# Patient Record
Sex: FEMALE | Race: WHITE | NOT HISPANIC OR LATINO | URBAN - METROPOLITAN AREA
[De-identification: names, ages, dates, MRNs, and addresses within clinical notes are randomized per-mention and may not be internally consistent; named-entity substitution may affect disease eponyms.]

---

## 2017-01-24 ENCOUNTER — INPATIENT (INPATIENT)
Facility: HOSPITAL | Age: 47
LOS: 3 days | Discharge: HOME | End: 2017-01-28
Attending: INTERNAL MEDICINE | Admitting: INTERNAL MEDICINE

## 2017-05-26 ENCOUNTER — APPOINTMENT (OUTPATIENT)
Dept: HEMATOLOGY ONCOLOGY | Facility: CLINIC | Age: 47
End: 2017-05-26

## 2017-06-26 ENCOUNTER — APPOINTMENT (OUTPATIENT)
Dept: HEMATOLOGY ONCOLOGY | Facility: CLINIC | Age: 47
End: 2017-06-26

## 2017-06-26 ENCOUNTER — OUTPATIENT (OUTPATIENT)
Dept: OUTPATIENT SERVICES | Facility: HOSPITAL | Age: 47
LOS: 1 days | Discharge: HOME | End: 2017-06-26

## 2017-06-26 VITALS
HEART RATE: 73 BPM | WEIGHT: 152 LBS | TEMPERATURE: 96.8 F | RESPIRATION RATE: 12 BRPM | DIASTOLIC BLOOD PRESSURE: 79 MMHG | SYSTOLIC BLOOD PRESSURE: 165 MMHG | HEIGHT: 63 IN | BODY MASS INDEX: 26.93 KG/M2

## 2017-06-26 DIAGNOSIS — Z87.891 PERSONAL HISTORY OF NICOTINE DEPENDENCE: ICD-10-CM

## 2017-06-26 DIAGNOSIS — J15.9 UNSPECIFIED BACTERIAL PNEUMONIA: ICD-10-CM

## 2017-06-26 DIAGNOSIS — Z80.3 FAMILY HISTORY OF MALIGNANT NEOPLASM OF BREAST: ICD-10-CM

## 2017-06-26 DIAGNOSIS — K12.1 OTHER FORMS OF STOMATITIS: ICD-10-CM

## 2017-06-26 DIAGNOSIS — R07.9 CHEST PAIN, UNSPECIFIED: ICD-10-CM

## 2017-06-26 DIAGNOSIS — M06.9 RHEUMATOID ARTHRITIS, UNSPECIFIED: ICD-10-CM

## 2017-06-26 DIAGNOSIS — Z80.8 FAMILY HISTORY OF MALIGNANT NEOPLASM OF OTHER ORGANS OR SYSTEMS: ICD-10-CM

## 2017-06-26 DIAGNOSIS — T82.9XXA UNSPECIFIED COMPLICATION OF CARDIAC AND VASCULAR PROSTHETIC DEVICE, IMPLANT AND GRAFT, INITIAL ENCOUNTER: ICD-10-CM

## 2017-06-26 DIAGNOSIS — K52.9 NONINFECTIVE GASTROENTERITIS AND COLITIS, UNSPECIFIED: ICD-10-CM

## 2017-06-26 DIAGNOSIS — R55 SYNCOPE AND COLLAPSE: ICD-10-CM

## 2017-06-26 DIAGNOSIS — I10 ESSENTIAL (PRIMARY) HYPERTENSION: ICD-10-CM

## 2017-06-26 DIAGNOSIS — L93.0 DISCOID LUPUS ERYTHEMATOSUS: ICD-10-CM

## 2017-06-26 DIAGNOSIS — I82.409 ACUTE EMBOLISM AND THROMBOSIS OF UNSPECIFIED DEEP VEINS OF UNSPECIFIED LOWER EXTREMITY: ICD-10-CM

## 2017-06-26 DIAGNOSIS — J20.9 ACUTE BRONCHITIS, UNSPECIFIED: ICD-10-CM

## 2017-06-26 DIAGNOSIS — Z80.1 FAMILY HISTORY OF MALIGNANT NEOPLASM OF TRACHEA, BRONCHUS AND LUNG: ICD-10-CM

## 2017-06-26 DIAGNOSIS — Z80.41 FAMILY HISTORY OF MALIGNANT NEOPLASM OF OVARY: ICD-10-CM

## 2017-06-26 DIAGNOSIS — J44.9 CHRONIC OBSTRUCTIVE PULMONARY DISEASE, UNSPECIFIED: ICD-10-CM

## 2017-06-27 DIAGNOSIS — Z90.710 ACQUIRED ABSENCE OF BOTH CERVIX AND UTERUS: ICD-10-CM

## 2017-06-27 DIAGNOSIS — J44.9 CHRONIC OBSTRUCTIVE PULMONARY DISEASE, UNSPECIFIED: ICD-10-CM

## 2017-06-27 DIAGNOSIS — Z95.810 PRESENCE OF AUTOMATIC (IMPLANTABLE) CARDIAC DEFIBRILLATOR: ICD-10-CM

## 2017-06-27 DIAGNOSIS — R55 SYNCOPE AND COLLAPSE: ICD-10-CM

## 2017-06-27 DIAGNOSIS — R53.1 WEAKNESS: ICD-10-CM

## 2017-06-27 DIAGNOSIS — M32.9 SYSTEMIC LUPUS ERYTHEMATOSUS, UNSPECIFIED: ICD-10-CM

## 2017-06-27 DIAGNOSIS — R04.0 EPISTAXIS: ICD-10-CM

## 2017-06-27 DIAGNOSIS — Z86.74 PERSONAL HISTORY OF SUDDEN CARDIAC ARREST: ICD-10-CM

## 2017-06-27 DIAGNOSIS — J02.9 ACUTE PHARYNGITIS, UNSPECIFIED: ICD-10-CM

## 2017-06-27 DIAGNOSIS — E87.6 HYPOKALEMIA: ICD-10-CM

## 2017-06-27 DIAGNOSIS — D69.6 THROMBOCYTOPENIA, UNSPECIFIED: ICD-10-CM

## 2017-06-27 DIAGNOSIS — K52.9 NONINFECTIVE GASTROENTERITIS AND COLITIS, UNSPECIFIED: ICD-10-CM

## 2017-06-27 DIAGNOSIS — Z90.02 ACQUIRED ABSENCE OF LARYNX: ICD-10-CM

## 2017-06-27 DIAGNOSIS — Z87.891 PERSONAL HISTORY OF NICOTINE DEPENDENCE: ICD-10-CM

## 2017-06-27 DIAGNOSIS — M06.9 RHEUMATOID ARTHRITIS, UNSPECIFIED: ICD-10-CM

## 2017-06-27 DIAGNOSIS — Z85.41 PERSONAL HISTORY OF MALIGNANT NEOPLASM OF CERVIX UTERI: ICD-10-CM

## 2017-06-27 DIAGNOSIS — E86.1 HYPOVOLEMIA: ICD-10-CM

## 2017-06-27 DIAGNOSIS — D72.819 DECREASED WHITE BLOOD CELL COUNT, UNSPECIFIED: ICD-10-CM

## 2017-06-27 DIAGNOSIS — N18.9 CHRONIC KIDNEY DISEASE, UNSPECIFIED: ICD-10-CM

## 2017-06-27 DIAGNOSIS — Z87.442 PERSONAL HISTORY OF URINARY CALCULI: ICD-10-CM

## 2017-06-27 DIAGNOSIS — M79.1 MYALGIA: ICD-10-CM

## 2017-06-27 DIAGNOSIS — N17.9 ACUTE KIDNEY FAILURE, UNSPECIFIED: ICD-10-CM

## 2017-06-28 DIAGNOSIS — D68.59 OTHER PRIMARY THROMBOPHILIA: ICD-10-CM

## 2017-09-08 ENCOUNTER — APPOINTMENT (OUTPATIENT)
Dept: OTOLARYNGOLOGY | Facility: CLINIC | Age: 47
End: 2017-09-08

## 2017-10-02 ENCOUNTER — APPOINTMENT (OUTPATIENT)
Dept: OTOLARYNGOLOGY | Facility: CLINIC | Age: 47
End: 2017-10-02
Payer: MEDICARE

## 2017-10-02 ENCOUNTER — OUTPATIENT (OUTPATIENT)
Dept: OUTPATIENT SERVICES | Facility: HOSPITAL | Age: 47
LOS: 1 days | Discharge: HOME | End: 2017-10-02

## 2017-10-02 VITALS — WEIGHT: 153 LBS | HEIGHT: 63 IN | BODY MASS INDEX: 27.11 KG/M2

## 2017-10-02 VITALS — HEIGHT: 63 IN | BODY MASS INDEX: 27.11 KG/M2 | WEIGHT: 153 LBS

## 2017-10-02 DIAGNOSIS — M06.9 RHEUMATOID ARTHRITIS, UNSPECIFIED: ICD-10-CM

## 2017-10-02 DIAGNOSIS — J20.9 ACUTE BRONCHITIS, UNSPECIFIED: ICD-10-CM

## 2017-10-02 DIAGNOSIS — R19.8 OTHER SPECIFIED SYMPTOMS AND SIGNS INVOLVING THE DIGESTIVE SYSTEM AND ABDOMEN: ICD-10-CM

## 2017-10-02 DIAGNOSIS — R07.9 CHEST PAIN, UNSPECIFIED: ICD-10-CM

## 2017-10-02 DIAGNOSIS — R31.9 HEMATURIA, UNSPECIFIED: ICD-10-CM

## 2017-10-02 DIAGNOSIS — D69.9 HEMORRHAGIC CONDITION, UNSPECIFIED: ICD-10-CM

## 2017-10-02 DIAGNOSIS — J15.9 UNSPECIFIED BACTERIAL PNEUMONIA: ICD-10-CM

## 2017-10-02 DIAGNOSIS — E07.9 DISORDER OF THYROID, UNSPECIFIED: ICD-10-CM

## 2017-10-02 DIAGNOSIS — E55.9 VITAMIN D DEFICIENCY, UNSPECIFIED: ICD-10-CM

## 2017-10-02 DIAGNOSIS — E78.9 DISORDER OF LIPOPROTEIN METABOLISM, UNSPECIFIED: ICD-10-CM

## 2017-10-02 DIAGNOSIS — R55 SYNCOPE AND COLLAPSE: ICD-10-CM

## 2017-10-02 DIAGNOSIS — K52.9 NONINFECTIVE GASTROENTERITIS AND COLITIS, UNSPECIFIED: ICD-10-CM

## 2017-10-02 DIAGNOSIS — D64.3 OTHER SIDEROBLASTIC ANEMIAS: ICD-10-CM

## 2017-10-02 DIAGNOSIS — I82.409 ACUTE EMBOLISM AND THROMBOSIS OF UNSPECIFIED DEEP VEINS OF UNSPECIFIED LOWER EXTREMITY: ICD-10-CM

## 2017-10-02 DIAGNOSIS — R07.81 PLEURODYNIA: ICD-10-CM

## 2017-10-02 DIAGNOSIS — I10 ESSENTIAL (PRIMARY) HYPERTENSION: ICD-10-CM

## 2017-10-02 DIAGNOSIS — D50.9 IRON DEFICIENCY ANEMIA, UNSPECIFIED: ICD-10-CM

## 2017-10-02 DIAGNOSIS — J44.9 CHRONIC OBSTRUCTIVE PULMONARY DISEASE, UNSPECIFIED: ICD-10-CM

## 2017-10-02 DIAGNOSIS — L93.0 DISCOID LUPUS ERYTHEMATOSUS: ICD-10-CM

## 2017-10-02 DIAGNOSIS — K12.1 OTHER FORMS OF STOMATITIS: ICD-10-CM

## 2017-10-02 DIAGNOSIS — T82.9XXA UNSPECIFIED COMPLICATION OF CARDIAC AND VASCULAR PROSTHETIC DEVICE, IMPLANT AND GRAFT, INITIAL ENCOUNTER: ICD-10-CM

## 2017-10-02 PROCEDURE — 31575 DIAGNOSTIC LARYNGOSCOPY: CPT

## 2017-10-02 PROCEDURE — 99213 OFFICE O/P EST LOW 20 MIN: CPT | Mod: 25

## 2017-10-02 RX ORDER — TRAZODONE HYDROCHLORIDE 100 MG/1
100 TABLET ORAL
Qty: 30 | Refills: 0 | Status: ACTIVE | COMMUNITY
Start: 2017-09-29

## 2017-10-10 DIAGNOSIS — Z02.9 ENCOUNTER FOR ADMINISTRATIVE EXAMINATIONS, UNSPECIFIED: ICD-10-CM

## 2017-10-12 ENCOUNTER — OUTPATIENT (OUTPATIENT)
Dept: OUTPATIENT SERVICES | Facility: HOSPITAL | Age: 47
LOS: 1 days | Discharge: HOME | End: 2017-10-12

## 2017-10-12 DIAGNOSIS — M06.9 RHEUMATOID ARTHRITIS, UNSPECIFIED: ICD-10-CM

## 2017-10-12 DIAGNOSIS — J20.9 ACUTE BRONCHITIS, UNSPECIFIED: ICD-10-CM

## 2017-10-12 DIAGNOSIS — R07.9 CHEST PAIN, UNSPECIFIED: ICD-10-CM

## 2017-10-12 DIAGNOSIS — L93.0 DISCOID LUPUS ERYTHEMATOSUS: ICD-10-CM

## 2017-10-12 DIAGNOSIS — R55 SYNCOPE AND COLLAPSE: ICD-10-CM

## 2017-10-12 DIAGNOSIS — T82.9XXA UNSPECIFIED COMPLICATION OF CARDIAC AND VASCULAR PROSTHETIC DEVICE, IMPLANT AND GRAFT, INITIAL ENCOUNTER: ICD-10-CM

## 2017-10-12 DIAGNOSIS — J44.9 CHRONIC OBSTRUCTIVE PULMONARY DISEASE, UNSPECIFIED: ICD-10-CM

## 2017-10-12 DIAGNOSIS — J15.9 UNSPECIFIED BACTERIAL PNEUMONIA: ICD-10-CM

## 2017-10-12 DIAGNOSIS — J02.9 ACUTE PHARYNGITIS, UNSPECIFIED: ICD-10-CM

## 2017-10-12 DIAGNOSIS — K12.1 OTHER FORMS OF STOMATITIS: ICD-10-CM

## 2017-10-12 DIAGNOSIS — K52.9 NONINFECTIVE GASTROENTERITIS AND COLITIS, UNSPECIFIED: ICD-10-CM

## 2017-10-12 DIAGNOSIS — I82.409 ACUTE EMBOLISM AND THROMBOSIS OF UNSPECIFIED DEEP VEINS OF UNSPECIFIED LOWER EXTREMITY: ICD-10-CM

## 2017-10-17 DIAGNOSIS — D50.9 IRON DEFICIENCY ANEMIA, UNSPECIFIED: ICD-10-CM

## 2017-10-17 DIAGNOSIS — D69.9 HEMORRHAGIC CONDITION, UNSPECIFIED: ICD-10-CM

## 2017-10-17 DIAGNOSIS — R31.9 HEMATURIA, UNSPECIFIED: ICD-10-CM

## 2017-10-17 DIAGNOSIS — R19.8 OTHER SPECIFIED SYMPTOMS AND SIGNS INVOLVING THE DIGESTIVE SYSTEM AND ABDOMEN: ICD-10-CM

## 2017-10-17 DIAGNOSIS — D64.3 OTHER SIDEROBLASTIC ANEMIAS: ICD-10-CM

## 2017-10-17 DIAGNOSIS — E07.9 DISORDER OF THYROID, UNSPECIFIED: ICD-10-CM

## 2017-10-17 DIAGNOSIS — I10 ESSENTIAL (PRIMARY) HYPERTENSION: ICD-10-CM

## 2017-10-17 DIAGNOSIS — E55.9 VITAMIN D DEFICIENCY, UNSPECIFIED: ICD-10-CM

## 2017-10-17 DIAGNOSIS — E78.9 DISORDER OF LIPOPROTEIN METABOLISM, UNSPECIFIED: ICD-10-CM

## 2017-10-23 ENCOUNTER — APPOINTMENT (OUTPATIENT)
Dept: OTOLARYNGOLOGY | Facility: CLINIC | Age: 47
End: 2017-10-23
Payer: MEDICARE

## 2017-10-23 VITALS — HEIGHT: 63 IN | WEIGHT: 153 LBS | BODY MASS INDEX: 27.11 KG/M2

## 2017-10-23 PROCEDURE — 99213 OFFICE O/P EST LOW 20 MIN: CPT | Mod: 25

## 2017-10-23 PROCEDURE — 31575 DIAGNOSTIC LARYNGOSCOPY: CPT

## 2017-10-24 ENCOUNTER — OUTPATIENT (OUTPATIENT)
Dept: OUTPATIENT SERVICES | Facility: HOSPITAL | Age: 47
LOS: 1 days | Discharge: HOME | End: 2017-10-24

## 2017-10-24 DIAGNOSIS — R55 SYNCOPE AND COLLAPSE: ICD-10-CM

## 2017-10-24 DIAGNOSIS — J44.9 CHRONIC OBSTRUCTIVE PULMONARY DISEASE, UNSPECIFIED: ICD-10-CM

## 2017-10-24 DIAGNOSIS — K52.9 NONINFECTIVE GASTROENTERITIS AND COLITIS, UNSPECIFIED: ICD-10-CM

## 2017-10-24 DIAGNOSIS — M06.9 RHEUMATOID ARTHRITIS, UNSPECIFIED: ICD-10-CM

## 2017-10-24 DIAGNOSIS — J15.9 UNSPECIFIED BACTERIAL PNEUMONIA: ICD-10-CM

## 2017-10-24 DIAGNOSIS — R07.9 CHEST PAIN, UNSPECIFIED: ICD-10-CM

## 2017-10-24 DIAGNOSIS — J20.9 ACUTE BRONCHITIS, UNSPECIFIED: ICD-10-CM

## 2017-10-24 DIAGNOSIS — L93.0 DISCOID LUPUS ERYTHEMATOSUS: ICD-10-CM

## 2017-10-24 DIAGNOSIS — I82.409 ACUTE EMBOLISM AND THROMBOSIS OF UNSPECIFIED DEEP VEINS OF UNSPECIFIED LOWER EXTREMITY: ICD-10-CM

## 2017-10-24 DIAGNOSIS — K12.1 OTHER FORMS OF STOMATITIS: ICD-10-CM

## 2017-10-24 DIAGNOSIS — C76.0 MALIGNANT NEOPLASM OF HEAD, FACE AND NECK: ICD-10-CM

## 2017-10-24 DIAGNOSIS — T82.9XXA UNSPECIFIED COMPLICATION OF CARDIAC AND VASCULAR PROSTHETIC DEVICE, IMPLANT AND GRAFT, INITIAL ENCOUNTER: ICD-10-CM

## 2017-10-25 ENCOUNTER — OUTPATIENT (OUTPATIENT)
Dept: OUTPATIENT SERVICES | Facility: HOSPITAL | Age: 47
LOS: 1 days | Discharge: HOME | End: 2017-10-25

## 2017-10-25 DIAGNOSIS — J44.9 CHRONIC OBSTRUCTIVE PULMONARY DISEASE, UNSPECIFIED: ICD-10-CM

## 2017-10-25 DIAGNOSIS — T82.9XXA UNSPECIFIED COMPLICATION OF CARDIAC AND VASCULAR PROSTHETIC DEVICE, IMPLANT AND GRAFT, INITIAL ENCOUNTER: ICD-10-CM

## 2017-10-25 DIAGNOSIS — I82.409 ACUTE EMBOLISM AND THROMBOSIS OF UNSPECIFIED DEEP VEINS OF UNSPECIFIED LOWER EXTREMITY: ICD-10-CM

## 2017-10-25 DIAGNOSIS — K52.9 NONINFECTIVE GASTROENTERITIS AND COLITIS, UNSPECIFIED: ICD-10-CM

## 2017-10-25 DIAGNOSIS — M06.9 RHEUMATOID ARTHRITIS, UNSPECIFIED: ICD-10-CM

## 2017-10-25 DIAGNOSIS — K12.1 OTHER FORMS OF STOMATITIS: ICD-10-CM

## 2017-10-25 DIAGNOSIS — L93.0 DISCOID LUPUS ERYTHEMATOSUS: ICD-10-CM

## 2017-10-25 DIAGNOSIS — J15.9 UNSPECIFIED BACTERIAL PNEUMONIA: ICD-10-CM

## 2017-10-25 DIAGNOSIS — Z12.31 ENCOUNTER FOR SCREENING MAMMOGRAM FOR MALIGNANT NEOPLASM OF BREAST: ICD-10-CM

## 2017-10-25 DIAGNOSIS — Z80.3 FAMILY HISTORY OF MALIGNANT NEOPLASM OF BREAST: ICD-10-CM

## 2017-10-25 DIAGNOSIS — R55 SYNCOPE AND COLLAPSE: ICD-10-CM

## 2017-10-25 DIAGNOSIS — J20.9 ACUTE BRONCHITIS, UNSPECIFIED: ICD-10-CM

## 2017-10-25 DIAGNOSIS — R07.9 CHEST PAIN, UNSPECIFIED: ICD-10-CM

## 2017-10-30 DIAGNOSIS — Z78.0 ASYMPTOMATIC MENOPAUSAL STATE: ICD-10-CM

## 2017-10-30 DIAGNOSIS — M89.9 DISORDER OF BONE, UNSPECIFIED: ICD-10-CM

## 2017-10-30 DIAGNOSIS — Z13.820 ENCOUNTER FOR SCREENING FOR OSTEOPOROSIS: ICD-10-CM

## 2017-11-01 ENCOUNTER — APPOINTMENT (OUTPATIENT)
Dept: OTOLARYNGOLOGY | Facility: CLINIC | Age: 47
End: 2017-11-01
Payer: MEDICARE

## 2017-11-01 ENCOUNTER — OUTPATIENT (OUTPATIENT)
Dept: OUTPATIENT SERVICES | Facility: HOSPITAL | Age: 47
LOS: 1 days | Discharge: HOME | End: 2017-11-01

## 2017-11-01 VITALS — BODY MASS INDEX: 27.11 KG/M2 | HEIGHT: 63 IN | WEIGHT: 153 LBS

## 2017-11-01 DIAGNOSIS — K12.1 OTHER FORMS OF STOMATITIS: ICD-10-CM

## 2017-11-01 DIAGNOSIS — J15.9 UNSPECIFIED BACTERIAL PNEUMONIA: ICD-10-CM

## 2017-11-01 DIAGNOSIS — R07.9 CHEST PAIN, UNSPECIFIED: ICD-10-CM

## 2017-11-01 DIAGNOSIS — I82.409 ACUTE EMBOLISM AND THROMBOSIS OF UNSPECIFIED DEEP VEINS OF UNSPECIFIED LOWER EXTREMITY: ICD-10-CM

## 2017-11-01 DIAGNOSIS — J44.9 CHRONIC OBSTRUCTIVE PULMONARY DISEASE, UNSPECIFIED: ICD-10-CM

## 2017-11-01 DIAGNOSIS — L93.0 DISCOID LUPUS ERYTHEMATOSUS: ICD-10-CM

## 2017-11-01 DIAGNOSIS — J20.9 ACUTE BRONCHITIS, UNSPECIFIED: ICD-10-CM

## 2017-11-01 DIAGNOSIS — M06.9 RHEUMATOID ARTHRITIS, UNSPECIFIED: ICD-10-CM

## 2017-11-01 DIAGNOSIS — T82.9XXA UNSPECIFIED COMPLICATION OF CARDIAC AND VASCULAR PROSTHETIC DEVICE, IMPLANT AND GRAFT, INITIAL ENCOUNTER: ICD-10-CM

## 2017-11-01 DIAGNOSIS — R55 SYNCOPE AND COLLAPSE: ICD-10-CM

## 2017-11-01 DIAGNOSIS — K52.9 NONINFECTIVE GASTROENTERITIS AND COLITIS, UNSPECIFIED: ICD-10-CM

## 2017-11-01 PROCEDURE — 99213 OFFICE O/P EST LOW 20 MIN: CPT | Mod: 25

## 2017-11-01 PROCEDURE — 40808 BIOPSY OF MOUTH LESION: CPT

## 2017-11-02 DIAGNOSIS — K12.1 OTHER FORMS OF STOMATITIS: ICD-10-CM

## 2017-11-08 ENCOUNTER — OUTPATIENT (OUTPATIENT)
Dept: OUTPATIENT SERVICES | Facility: HOSPITAL | Age: 47
LOS: 1 days | Discharge: HOME | End: 2017-11-08

## 2017-11-08 ENCOUNTER — APPOINTMENT (OUTPATIENT)
Dept: OTOLARYNGOLOGY | Facility: CLINIC | Age: 47
End: 2017-11-08
Payer: MEDICARE

## 2017-11-08 ENCOUNTER — APPOINTMENT (OUTPATIENT)
Dept: HEMATOLOGY ONCOLOGY | Facility: CLINIC | Age: 47
End: 2017-11-08

## 2017-11-08 VITALS
HEART RATE: 74 BPM | BODY MASS INDEX: 27.29 KG/M2 | DIASTOLIC BLOOD PRESSURE: 96 MMHG | TEMPERATURE: 98.4 F | WEIGHT: 154 LBS | SYSTOLIC BLOOD PRESSURE: 132 MMHG | RESPIRATION RATE: 16 BRPM | HEIGHT: 63 IN

## 2017-11-08 VITALS — HEIGHT: 63 IN | WEIGHT: 153 LBS | BODY MASS INDEX: 27.11 KG/M2

## 2017-11-08 DIAGNOSIS — R92.2 INCONCLUSIVE MAMMOGRAM: ICD-10-CM

## 2017-11-08 DIAGNOSIS — K12.1 OTHER FORMS OF STOMATITIS: ICD-10-CM

## 2017-11-08 PROCEDURE — 99214 OFFICE O/P EST MOD 30 MIN: CPT | Mod: 24

## 2017-11-13 ENCOUNTER — APPOINTMENT (OUTPATIENT)
Dept: HEMATOLOGY ONCOLOGY | Facility: CLINIC | Age: 47
End: 2017-11-13

## 2017-11-14 ENCOUNTER — OUTPATIENT (OUTPATIENT)
Dept: OUTPATIENT SERVICES | Facility: HOSPITAL | Age: 47
LOS: 1 days | Discharge: HOME | End: 2017-11-14

## 2017-11-14 DIAGNOSIS — I82.409 ACUTE EMBOLISM AND THROMBOSIS OF UNSPECIFIED DEEP VEINS OF UNSPECIFIED LOWER EXTREMITY: ICD-10-CM

## 2017-11-14 DIAGNOSIS — C80.1 MALIGNANT (PRIMARY) NEOPLASM, UNSPECIFIED: ICD-10-CM

## 2017-11-14 DIAGNOSIS — L93.0 DISCOID LUPUS ERYTHEMATOSUS: ICD-10-CM

## 2017-11-14 DIAGNOSIS — J20.9 ACUTE BRONCHITIS, UNSPECIFIED: ICD-10-CM

## 2017-11-14 DIAGNOSIS — R49.0 DYSPHONIA: ICD-10-CM

## 2017-11-14 DIAGNOSIS — F32.9 MAJOR DEPRESSIVE DISORDER, SINGLE EPISODE, UNSPECIFIED: ICD-10-CM

## 2017-11-14 DIAGNOSIS — Z01.818 ENCOUNTER FOR OTHER PREPROCEDURAL EXAMINATION: ICD-10-CM

## 2017-11-14 DIAGNOSIS — R07.9 CHEST PAIN, UNSPECIFIED: ICD-10-CM

## 2017-11-14 DIAGNOSIS — I25.10 ATHEROSCLEROTIC HEART DISEASE OF NATIVE CORONARY ARTERY WITHOUT ANGINA PECTORIS: ICD-10-CM

## 2017-11-14 DIAGNOSIS — Z87.891 PERSONAL HISTORY OF NICOTINE DEPENDENCE: ICD-10-CM

## 2017-11-14 DIAGNOSIS — J44.9 CHRONIC OBSTRUCTIVE PULMONARY DISEASE, UNSPECIFIED: ICD-10-CM

## 2017-11-14 DIAGNOSIS — I10 ESSENTIAL (PRIMARY) HYPERTENSION: ICD-10-CM

## 2017-11-14 DIAGNOSIS — R55 SYNCOPE AND COLLAPSE: ICD-10-CM

## 2017-11-14 DIAGNOSIS — M06.9 RHEUMATOID ARTHRITIS, UNSPECIFIED: ICD-10-CM

## 2017-11-14 DIAGNOSIS — F41.9 ANXIETY DISORDER, UNSPECIFIED: ICD-10-CM

## 2017-11-14 DIAGNOSIS — J15.9 UNSPECIFIED BACTERIAL PNEUMONIA: ICD-10-CM

## 2017-11-14 DIAGNOSIS — T82.9XXA UNSPECIFIED COMPLICATION OF CARDIAC AND VASCULAR PROSTHETIC DEVICE, IMPLANT AND GRAFT, INITIAL ENCOUNTER: ICD-10-CM

## 2017-11-14 DIAGNOSIS — K52.9 NONINFECTIVE GASTROENTERITIS AND COLITIS, UNSPECIFIED: ICD-10-CM

## 2017-11-14 DIAGNOSIS — K12.1 OTHER FORMS OF STOMATITIS: ICD-10-CM

## 2017-11-15 DIAGNOSIS — R59.0 LOCALIZED ENLARGED LYMPH NODES: ICD-10-CM

## 2017-11-21 ENCOUNTER — OUTPATIENT (OUTPATIENT)
Dept: OUTPATIENT SERVICES | Facility: HOSPITAL | Age: 47
LOS: 1 days | Discharge: HOME | End: 2017-11-21

## 2017-11-21 DIAGNOSIS — R49.0 DYSPHONIA: ICD-10-CM

## 2017-11-21 DIAGNOSIS — I25.10 ATHEROSCLEROTIC HEART DISEASE OF NATIVE CORONARY ARTERY WITHOUT ANGINA PECTORIS: ICD-10-CM

## 2017-11-21 DIAGNOSIS — Z87.891 PERSONAL HISTORY OF NICOTINE DEPENDENCE: ICD-10-CM

## 2017-11-21 DIAGNOSIS — M06.9 RHEUMATOID ARTHRITIS, UNSPECIFIED: ICD-10-CM

## 2017-11-21 DIAGNOSIS — T82.9XXA UNSPECIFIED COMPLICATION OF CARDIAC AND VASCULAR PROSTHETIC DEVICE, IMPLANT AND GRAFT, INITIAL ENCOUNTER: ICD-10-CM

## 2017-11-21 DIAGNOSIS — J15.9 UNSPECIFIED BACTERIAL PNEUMONIA: ICD-10-CM

## 2017-11-21 DIAGNOSIS — J20.9 ACUTE BRONCHITIS, UNSPECIFIED: ICD-10-CM

## 2017-11-21 DIAGNOSIS — F32.9 MAJOR DEPRESSIVE DISORDER, SINGLE EPISODE, UNSPECIFIED: ICD-10-CM

## 2017-11-21 DIAGNOSIS — K52.9 NONINFECTIVE GASTROENTERITIS AND COLITIS, UNSPECIFIED: ICD-10-CM

## 2017-11-21 DIAGNOSIS — J44.9 CHRONIC OBSTRUCTIVE PULMONARY DISEASE, UNSPECIFIED: ICD-10-CM

## 2017-11-21 DIAGNOSIS — Z01.818 ENCOUNTER FOR OTHER PREPROCEDURAL EXAMINATION: ICD-10-CM

## 2017-11-21 DIAGNOSIS — R07.9 CHEST PAIN, UNSPECIFIED: ICD-10-CM

## 2017-11-21 DIAGNOSIS — I82.409 ACUTE EMBOLISM AND THROMBOSIS OF UNSPECIFIED DEEP VEINS OF UNSPECIFIED LOWER EXTREMITY: ICD-10-CM

## 2017-11-21 DIAGNOSIS — L93.0 DISCOID LUPUS ERYTHEMATOSUS: ICD-10-CM

## 2017-11-21 DIAGNOSIS — R55 SYNCOPE AND COLLAPSE: ICD-10-CM

## 2017-11-21 DIAGNOSIS — I10 ESSENTIAL (PRIMARY) HYPERTENSION: ICD-10-CM

## 2017-11-21 DIAGNOSIS — C80.1 MALIGNANT (PRIMARY) NEOPLASM, UNSPECIFIED: ICD-10-CM

## 2017-11-21 DIAGNOSIS — K12.1 OTHER FORMS OF STOMATITIS: ICD-10-CM

## 2017-11-22 ENCOUNTER — APPOINTMENT (OUTPATIENT)
Dept: OTOLARYNGOLOGY | Facility: CLINIC | Age: 47
End: 2017-11-22

## 2017-11-27 ENCOUNTER — RX RENEWAL (OUTPATIENT)
Age: 47
End: 2017-11-27

## 2017-11-27 ENCOUNTER — APPOINTMENT (OUTPATIENT)
Dept: OTOLARYNGOLOGY | Facility: CLINIC | Age: 47
End: 2017-11-27

## 2017-11-28 ENCOUNTER — APPOINTMENT (OUTPATIENT)
Dept: OTOLARYNGOLOGY | Facility: AMBULATORY SURGERY CENTER | Age: 47
End: 2017-11-28
Payer: MEDICARE

## 2017-11-28 ENCOUNTER — OUTPATIENT (OUTPATIENT)
Dept: OUTPATIENT SERVICES | Facility: HOSPITAL | Age: 47
LOS: 1 days | Discharge: HOME | End: 2017-11-28

## 2017-11-28 DIAGNOSIS — K12.1 OTHER FORMS OF STOMATITIS: ICD-10-CM

## 2017-11-28 DIAGNOSIS — J20.9 ACUTE BRONCHITIS, UNSPECIFIED: ICD-10-CM

## 2017-11-28 DIAGNOSIS — L93.0 DISCOID LUPUS ERYTHEMATOSUS: ICD-10-CM

## 2017-11-28 DIAGNOSIS — M06.9 RHEUMATOID ARTHRITIS, UNSPECIFIED: ICD-10-CM

## 2017-11-28 DIAGNOSIS — I82.409 ACUTE EMBOLISM AND THROMBOSIS OF UNSPECIFIED DEEP VEINS OF UNSPECIFIED LOWER EXTREMITY: ICD-10-CM

## 2017-11-28 DIAGNOSIS — T82.9XXA UNSPECIFIED COMPLICATION OF CARDIAC AND VASCULAR PROSTHETIC DEVICE, IMPLANT AND GRAFT, INITIAL ENCOUNTER: ICD-10-CM

## 2017-11-28 DIAGNOSIS — R07.9 CHEST PAIN, UNSPECIFIED: ICD-10-CM

## 2017-11-28 DIAGNOSIS — R55 SYNCOPE AND COLLAPSE: ICD-10-CM

## 2017-11-28 DIAGNOSIS — K52.9 NONINFECTIVE GASTROENTERITIS AND COLITIS, UNSPECIFIED: ICD-10-CM

## 2017-11-28 DIAGNOSIS — J44.9 CHRONIC OBSTRUCTIVE PULMONARY DISEASE, UNSPECIFIED: ICD-10-CM

## 2017-11-28 DIAGNOSIS — J15.9 UNSPECIFIED BACTERIAL PNEUMONIA: ICD-10-CM

## 2017-11-28 PROCEDURE — 31535 LARYNGOSCOPY W/BIOPSY: CPT

## 2017-11-28 PROCEDURE — 38510 BIOPSY/REMOVAL LYMPH NODES: CPT

## 2017-12-04 ENCOUNTER — APPOINTMENT (OUTPATIENT)
Dept: HEMATOLOGY ONCOLOGY | Facility: CLINIC | Age: 47
End: 2017-12-04

## 2017-12-04 DIAGNOSIS — J44.9 CHRONIC OBSTRUCTIVE PULMONARY DISEASE, UNSPECIFIED: ICD-10-CM

## 2017-12-04 DIAGNOSIS — Z95.810 PRESENCE OF AUTOMATIC (IMPLANTABLE) CARDIAC DEFIBRILLATOR: ICD-10-CM

## 2017-12-04 DIAGNOSIS — R59.9 ENLARGED LYMPH NODES, UNSPECIFIED: ICD-10-CM

## 2017-12-04 DIAGNOSIS — J38.3 OTHER DISEASES OF VOCAL CORDS: ICD-10-CM

## 2017-12-06 ENCOUNTER — APPOINTMENT (OUTPATIENT)
Dept: OTOLARYNGOLOGY | Facility: CLINIC | Age: 47
End: 2017-12-06
Payer: MEDICARE

## 2017-12-06 VITALS — BODY MASS INDEX: 27.29 KG/M2 | WEIGHT: 154 LBS | HEIGHT: 63 IN

## 2017-12-06 PROCEDURE — 31575 DIAGNOSTIC LARYNGOSCOPY: CPT | Mod: 58

## 2017-12-06 PROCEDURE — 99024 POSTOP FOLLOW-UP VISIT: CPT

## 2017-12-18 ENCOUNTER — APPOINTMENT (OUTPATIENT)
Dept: HEMATOLOGY ONCOLOGY | Facility: CLINIC | Age: 47
End: 2017-12-18

## 2017-12-20 ENCOUNTER — APPOINTMENT (OUTPATIENT)
Dept: OTOLARYNGOLOGY | Facility: CLINIC | Age: 47
End: 2017-12-20

## 2018-03-01 ENCOUNTER — OTHER (OUTPATIENT)
Age: 48
End: 2018-03-01

## 2018-03-07 ENCOUNTER — APPOINTMENT (OUTPATIENT)
Dept: OTOLARYNGOLOGY | Facility: CLINIC | Age: 48
End: 2018-03-07

## 2018-03-16 ENCOUNTER — APPOINTMENT (OUTPATIENT)
Dept: OTOLARYNGOLOGY | Facility: CLINIC | Age: 48
End: 2018-03-16

## 2018-04-27 ENCOUNTER — APPOINTMENT (OUTPATIENT)
Dept: OTOLARYNGOLOGY | Facility: CLINIC | Age: 48
End: 2018-04-27
Payer: MEDICARE

## 2018-04-27 VITALS
BODY MASS INDEX: 28.35 KG/M2 | WEIGHT: 160 LBS | DIASTOLIC BLOOD PRESSURE: 85 MMHG | SYSTOLIC BLOOD PRESSURE: 130 MMHG | HEIGHT: 63 IN

## 2018-04-27 PROCEDURE — 31575 DIAGNOSTIC LARYNGOSCOPY: CPT

## 2018-04-27 PROCEDURE — 99215 OFFICE O/P EST HI 40 MIN: CPT | Mod: 25

## 2018-04-27 RX ORDER — CEPHALEXIN 500 MG/1
500 CAPSULE ORAL TWICE DAILY
Qty: 28 | Refills: 0 | Status: ACTIVE | COMMUNITY
Start: 2018-04-27 | End: 1900-01-01

## 2018-05-07 ENCOUNTER — INPATIENT (INPATIENT)
Facility: HOSPITAL | Age: 48
LOS: 10 days | Discharge: HOME | End: 2018-05-18
Attending: INTERNAL MEDICINE | Admitting: INTERNAL MEDICINE
Payer: MEDICARE

## 2018-05-07 VITALS
OXYGEN SATURATION: 96 % | SYSTOLIC BLOOD PRESSURE: 120 MMHG | TEMPERATURE: 99 F | DIASTOLIC BLOOD PRESSURE: 82 MMHG | RESPIRATION RATE: 20 BRPM | HEART RATE: 118 BPM

## 2018-05-07 LAB
ANION GAP SERPL CALC-SCNC: 13 MMOL/L — SIGNIFICANT CHANGE UP (ref 7–14)
APTT BLD: 47.1 SEC — HIGH (ref 27–39.2)
BASOPHILS # BLD AUTO: 0.04 K/UL — SIGNIFICANT CHANGE UP (ref 0–0.2)
BASOPHILS NFR BLD AUTO: 0.4 % — SIGNIFICANT CHANGE UP (ref 0–1)
BUN SERPL-MCNC: 12 MG/DL — SIGNIFICANT CHANGE UP (ref 10–20)
CALCIUM SERPL-MCNC: 9.1 MG/DL — SIGNIFICANT CHANGE UP (ref 8.5–10.1)
CHLORIDE SERPL-SCNC: 98 MMOL/L — SIGNIFICANT CHANGE UP (ref 98–110)
CO2 SERPL-SCNC: 31 MMOL/L — SIGNIFICANT CHANGE UP (ref 17–32)
CREAT SERPL-MCNC: 1 MG/DL — SIGNIFICANT CHANGE UP (ref 0.7–1.5)
EOSINOPHIL # BLD AUTO: 0.29 K/UL — SIGNIFICANT CHANGE UP (ref 0–0.7)
EOSINOPHIL NFR BLD AUTO: 2.9 % — SIGNIFICANT CHANGE UP (ref 0–8)
GAS PNL BLDV: SIGNIFICANT CHANGE UP
GLUCOSE SERPL-MCNC: 107 MG/DL — HIGH (ref 70–99)
HCT VFR BLD CALC: 44.5 % — SIGNIFICANT CHANGE UP (ref 37–47)
HGB BLD-MCNC: 14.5 G/DL — SIGNIFICANT CHANGE UP (ref 12–16)
IMM GRANULOCYTES NFR BLD AUTO: 0.6 % — HIGH (ref 0.1–0.3)
INR BLD: 1.03 RATIO — SIGNIFICANT CHANGE UP (ref 0.65–1.3)
LYMPHOCYTES # BLD AUTO: 2.67 K/UL — SIGNIFICANT CHANGE UP (ref 1.2–3.4)
LYMPHOCYTES # BLD AUTO: 26.4 % — SIGNIFICANT CHANGE UP (ref 20.5–51.1)
MCHC RBC-ENTMCNC: 28.3 PG — SIGNIFICANT CHANGE UP (ref 27–31)
MCHC RBC-ENTMCNC: 32.6 G/DL — SIGNIFICANT CHANGE UP (ref 32–37)
MCV RBC AUTO: 86.9 FL — SIGNIFICANT CHANGE UP (ref 81–99)
MONOCYTES # BLD AUTO: 0.63 K/UL — HIGH (ref 0.1–0.6)
MONOCYTES NFR BLD AUTO: 6.2 % — SIGNIFICANT CHANGE UP (ref 1.7–9.3)
NEUTROPHILS # BLD AUTO: 6.43 K/UL — SIGNIFICANT CHANGE UP (ref 1.4–6.5)
NEUTROPHILS NFR BLD AUTO: 63.5 % — SIGNIFICANT CHANGE UP (ref 42.2–75.2)
PLATELET # BLD AUTO: 167 K/UL — SIGNIFICANT CHANGE UP (ref 130–400)
POTASSIUM SERPL-MCNC: 3.9 MMOL/L — SIGNIFICANT CHANGE UP (ref 3.5–5)
POTASSIUM SERPL-SCNC: 3.9 MMOL/L — SIGNIFICANT CHANGE UP (ref 3.5–5)
PROTHROM AB SERPL-ACNC: 11.1 SEC — SIGNIFICANT CHANGE UP (ref 9.95–12.87)
RBC # BLD: 5.12 M/UL — SIGNIFICANT CHANGE UP (ref 4.2–5.4)
RBC # FLD: 13.4 % — SIGNIFICANT CHANGE UP (ref 11.5–14.5)
SODIUM SERPL-SCNC: 142 MMOL/L — SIGNIFICANT CHANGE UP (ref 135–146)
TYPE + AB SCN PNL BLD: SIGNIFICANT CHANGE UP
WBC # BLD: 10.12 K/UL — SIGNIFICANT CHANGE UP (ref 4.8–10.8)
WBC # FLD AUTO: 10.12 K/UL — SIGNIFICANT CHANGE UP (ref 4.8–10.8)

## 2018-05-07 PROCEDURE — 99223 1ST HOSP IP/OBS HIGH 75: CPT | Mod: 25

## 2018-05-07 PROCEDURE — 31575 DIAGNOSTIC LARYNGOSCOPY: CPT

## 2018-05-07 RX ORDER — NYSTATIN 500MM UNIT
500000 POWDER (EA) MISCELLANEOUS ONCE
Qty: 0 | Refills: 0 | Status: COMPLETED | OUTPATIENT
Start: 2018-05-07 | End: 2018-05-07

## 2018-05-07 RX ORDER — SODIUM CHLORIDE 9 MG/ML
500 INJECTION INTRAMUSCULAR; INTRAVENOUS; SUBCUTANEOUS ONCE
Qty: 0 | Refills: 0 | Status: COMPLETED | OUTPATIENT
Start: 2018-05-07 | End: 2018-05-07

## 2018-05-07 RX ADMIN — SODIUM CHLORIDE 1000 MILLILITER(S): 9 INJECTION INTRAMUSCULAR; INTRAVENOUS; SUBCUTANEOUS at 22:35

## 2018-05-07 NOTE — ED PROVIDER NOTE - OBJECTIVE STATEMENT
47 F pmh chf with aicd, lupus anticoagulant disorder with dvt on xarelto, copd, ra, laryngeal cancer on chemo, recent diagnosis of laryngeal thrush and finished course of diflucan on friday reports generalized weakness, poor appetite and poor intake over the past several weeks.  denies chest pain. no cough. no sob. no nausea/vomiting. reports intermittent fever at home.  last dose of antipyretic 4pm today.

## 2018-05-07 NOTE — ED ADULT NURSE NOTE - OBJECTIVE STATEMENT
pt presents to the ED with c/o generalized weakness since november. PT had a Mass taken out of her throat in November  with lymph nodes on the left side and had scraping of her vocal cords. PT was seen her her ENT doctor and was put on diflucan for infection in the esophagus as per pt, but pt continues to feel weak with cp/sob. pt denies fevers/chills, n/v/d.

## 2018-05-07 NOTE — CONSULT NOTE ADULT - ASSESSMENT
47y old Female with laryngeal thrush -- improving.    PLAN  1- Cont nystatin swish & swallow TID x1 week

## 2018-05-07 NOTE — CONSULT NOTE ADULT - ATTENDING COMMENTS
Patient scoped at bedside, net improvement in pharyngolaryngeal thrush.  Recommend continuing Nystatin TID for one more week.

## 2018-05-07 NOTE — CONSULT NOTE ADULT - SUBJECTIVE AND OBJECTIVE BOX
Patient is a 47y old Female with hx laryngeal Ca s/p chemo, recently dx with laryngeal thrush, s/p diflucan & nystatin S&S -- last dose Diflucan Friday 5/4. Patient c/o decreased appetite & PO intake over last few weeks with persistent hoarse voice.  Denies fever, chills, N/V.      PAST MEDICAL & SURGICAL HISTORY:  DVT (deep venous thrombosis)  COPD (chronic obstructive pulmonary disease)  CHF (congestive heart failure)  HTN (hypertension)  RA (rheumatoid arthritis)  Throat cancer  AICD (automatic cardioverter/defibrillator) present  Lupus  Home Medications:  Benlysta 200 mg/mL subcutaneous solution:  (07 May 2018 18:55)  Dilaudid 4 mg oral tablet:  (07 May 2018 18:55)  folic acid:  (07 May 2018 18:55)  gabapentin 300 mg oral tablet:  (07 May 2018 18:55)  hydrALAZINE 50 mg oral tablet:  (07 May 2018 18:55)  methotrexate:  (07 May 2018 18:55)  MS Contin 100 mg oral tablet, extended release:  (07 May 2018 18:55)  mycophenolate mofetil:  (07 May 2018 18:55)  Prevacid 30 mg oral delayed release capsule:  (07 May 2018 18:55)  Reglan:  (07 May 2018 18:55)  Toprol-XL 50 mg oral tablet, extended release:  (07 May 2018 18:55)  traZODone:  (07 May 2018 18:55)  Vistaril 50 mg oral capsule:  (07 May 2018 18:55)  Xanax 1 mg oral tablet:  (07 May 2018 18:55)  Xarelto 20 mg oral tablet: 1 tab(s) orally once a day (in the evening) (07 May 2018 18:55)  ALLERGIES: Avelox (Other)  Plaquenil (Other)  Vantin (Other (Mild))    VS: T(F): 98.6, Max: 98.6 (05-07 @ 17:31)  HR: 118 (118 - 118)  BP: 120/82 (120/82 - 120/82)  RR: 20  SpO2: 96% (96% - 96%)  GEN: Alert, awake, NAD  HEENT: +hoarseness, neck supple without LAD. Resp even/unlabored.

## 2018-05-07 NOTE — ED ADULT NURSE NOTE - PMH
AICD (automatic cardioverter/defibrillator) present    CHF (congestive heart failure)    COPD (chronic obstructive pulmonary disease)    DVT (deep venous thrombosis)    HTN (hypertension)    Lupus    RA (rheumatoid arthritis)    Throat cancer

## 2018-05-07 NOTE — ED PROVIDER NOTE - PROGRESS NOTE DETAILS
patient seen and evaluated by ENT Dr. Oconnor.  laryngeal scope reveals improvement but not complete resolution of thrush.  recommended nystatin swish and swallow, no need for further po diflucan at this time.

## 2018-05-07 NOTE — ED PROVIDER NOTE - NS ED ROS FT
Eyes:  no eye redness.   ENMT:  no otalgia. (+) sore throat.  (+) laryngeal thrush.  (+) laryngeal cancer  Cardiac:  no chest pain.  no sob.   Respiratory:  no cough. no respiratory distress.   GI:  no nausea. no vomiting. no diarrhea.  no melena. no abd pain.  (+) decreased appetite  :  no dysuria. no burning.  no hematuria.   MS:  no myalgia. no back pain  Neuro:  no headache. no focal weakness  Skin:  no skin rash  Endocrine: no diabetes

## 2018-05-07 NOTE — ED PROVIDER NOTE - PHYSICAL EXAMINATION
CONSTITUTIONAL: Well-developed; well-nourished; in no acute distress.   SKIN: warm, dry  EYES: no conj injection  ENT: no nasal discharge; no pharyngeal erythema, no exudates, airway clear.  CARD: S1, S2 normal  RESP: clear to auscultation bilaterally.   ABD: soft ntnd. no rebound or guarding  EXT: Normal ROM.  5/5 strength bilaterally. normal gait. 2+ radial pulses   NEURO: alert, oriented. no focal deficits

## 2018-05-08 DIAGNOSIS — Z90.49 ACQUIRED ABSENCE OF OTHER SPECIFIED PARTS OF DIGESTIVE TRACT: Chronic | ICD-10-CM

## 2018-05-08 DIAGNOSIS — Z85.21 PERSONAL HISTORY OF MALIGNANT NEOPLASM OF LARYNX: Chronic | ICD-10-CM

## 2018-05-08 LAB
ANION GAP SERPL CALC-SCNC: 14 MMOL/L — SIGNIFICANT CHANGE UP (ref 7–14)
BASOPHILS # BLD AUTO: 0.04 K/UL — SIGNIFICANT CHANGE UP (ref 0–0.2)
BASOPHILS NFR BLD AUTO: 0.3 % — SIGNIFICANT CHANGE UP (ref 0–1)
BUN SERPL-MCNC: 10 MG/DL — SIGNIFICANT CHANGE UP (ref 10–20)
CALCIUM SERPL-MCNC: 8.9 MG/DL — SIGNIFICANT CHANGE UP (ref 8.5–10.1)
CHLORIDE SERPL-SCNC: 102 MMOL/L — SIGNIFICANT CHANGE UP (ref 98–110)
CO2 SERPL-SCNC: 27 MMOL/L — SIGNIFICANT CHANGE UP (ref 17–32)
CREAT SERPL-MCNC: 0.8 MG/DL — SIGNIFICANT CHANGE UP (ref 0.7–1.5)
EOSINOPHIL # BLD AUTO: 0.38 K/UL — SIGNIFICANT CHANGE UP (ref 0–0.7)
EOSINOPHIL NFR BLD AUTO: 3.2 % — SIGNIFICANT CHANGE UP (ref 0–8)
GLUCOSE SERPL-MCNC: 96 MG/DL — SIGNIFICANT CHANGE UP (ref 70–99)
HCT VFR BLD CALC: 43.7 % — SIGNIFICANT CHANGE UP (ref 37–47)
HGB BLD-MCNC: 14.1 G/DL — SIGNIFICANT CHANGE UP (ref 12–16)
IMM GRANULOCYTES NFR BLD AUTO: 0.6 % — HIGH (ref 0.1–0.3)
LYMPHOCYTES # BLD AUTO: 39.9 % — SIGNIFICANT CHANGE UP (ref 20.5–51.1)
LYMPHOCYTES # BLD AUTO: 4.78 K/UL — HIGH (ref 1.2–3.4)
MCHC RBC-ENTMCNC: 27.7 PG — SIGNIFICANT CHANGE UP (ref 27–31)
MCHC RBC-ENTMCNC: 32.3 G/DL — SIGNIFICANT CHANGE UP (ref 32–37)
MCV RBC AUTO: 85.9 FL — SIGNIFICANT CHANGE UP (ref 81–99)
MONOCYTES # BLD AUTO: 0.93 K/UL — HIGH (ref 0.1–0.6)
MONOCYTES NFR BLD AUTO: 7.8 % — SIGNIFICANT CHANGE UP (ref 1.7–9.3)
NEUTROPHILS # BLD AUTO: 5.79 K/UL — SIGNIFICANT CHANGE UP (ref 1.4–6.5)
NEUTROPHILS NFR BLD AUTO: 48.2 % — SIGNIFICANT CHANGE UP (ref 42.2–75.2)
PLATELET # BLD AUTO: 197 K/UL — SIGNIFICANT CHANGE UP (ref 130–400)
POTASSIUM SERPL-MCNC: 4.3 MMOL/L — SIGNIFICANT CHANGE UP (ref 3.5–5)
POTASSIUM SERPL-SCNC: 4.3 MMOL/L — SIGNIFICANT CHANGE UP (ref 3.5–5)
RBC # BLD: 5.09 M/UL — SIGNIFICANT CHANGE UP (ref 4.2–5.4)
RBC # FLD: 13.5 % — SIGNIFICANT CHANGE UP (ref 11.5–14.5)
SODIUM SERPL-SCNC: 143 MMOL/L — SIGNIFICANT CHANGE UP (ref 135–146)
TROPONIN T SERPL-MCNC: <0.01 NG/ML — SIGNIFICANT CHANGE UP
WBC # BLD: 11.99 K/UL — HIGH (ref 4.8–10.8)
WBC # FLD AUTO: 11.99 K/UL — HIGH (ref 4.8–10.8)

## 2018-05-08 RX ORDER — HYDRALAZINE HCL 50 MG
25 TABLET ORAL EVERY 12 HOURS
Qty: 0 | Refills: 0 | Status: DISCONTINUED | OUTPATIENT
Start: 2018-05-08 | End: 2018-05-18

## 2018-05-08 RX ORDER — METOCLOPRAMIDE HCL 10 MG
10 TABLET ORAL ONCE
Qty: 0 | Refills: 0 | Status: COMPLETED | OUTPATIENT
Start: 2018-05-08 | End: 2018-05-08

## 2018-05-08 RX ORDER — FLUCONAZOLE 150 MG/1
TABLET ORAL
Qty: 0 | Refills: 0 | Status: DISCONTINUED | OUTPATIENT
Start: 2018-05-08 | End: 2018-05-09

## 2018-05-08 RX ORDER — HYDROMORPHONE HYDROCHLORIDE 2 MG/ML
8 INJECTION INTRAMUSCULAR; INTRAVENOUS; SUBCUTANEOUS
Qty: 0 | Refills: 0 | Status: DISCONTINUED | OUTPATIENT
Start: 2018-05-08 | End: 2018-05-08

## 2018-05-08 RX ORDER — ALPRAZOLAM 0.25 MG
1 TABLET ORAL THREE TIMES A DAY
Qty: 0 | Refills: 0 | Status: DISCONTINUED | OUTPATIENT
Start: 2018-05-08 | End: 2018-05-10

## 2018-05-08 RX ORDER — NYSTATIN 500MM UNIT
500000 POWDER (EA) MISCELLANEOUS THREE TIMES A DAY
Qty: 0 | Refills: 0 | Status: DISCONTINUED | OUTPATIENT
Start: 2018-05-08 | End: 2018-05-18

## 2018-05-08 RX ORDER — RIVAROXABAN 15 MG-20MG
20 KIT ORAL AT BEDTIME
Qty: 0 | Refills: 0 | Status: DISCONTINUED | OUTPATIENT
Start: 2018-05-08 | End: 2018-05-10

## 2018-05-08 RX ORDER — FLUCONAZOLE 150 MG/1
100 TABLET ORAL ONCE
Qty: 0 | Refills: 0 | Status: COMPLETED | OUTPATIENT
Start: 2018-05-08 | End: 2018-05-08

## 2018-05-08 RX ORDER — HYDROMORPHONE HYDROCHLORIDE 2 MG/ML
4 INJECTION INTRAMUSCULAR; INTRAVENOUS; SUBCUTANEOUS
Qty: 0 | Refills: 0 | Status: DISCONTINUED | OUTPATIENT
Start: 2018-05-08 | End: 2018-05-08

## 2018-05-08 RX ORDER — MORPHINE SULFATE 50 MG/1
200 CAPSULE, EXTENDED RELEASE ORAL THREE TIMES A DAY
Qty: 0 | Refills: 0 | Status: DISCONTINUED | OUTPATIENT
Start: 2018-05-08 | End: 2018-05-15

## 2018-05-08 RX ORDER — SODIUM CHLORIDE 9 MG/ML
1000 INJECTION INTRAMUSCULAR; INTRAVENOUS; SUBCUTANEOUS
Qty: 0 | Refills: 0 | Status: DISCONTINUED | OUTPATIENT
Start: 2018-05-08 | End: 2018-05-08

## 2018-05-08 RX ORDER — GABAPENTIN 400 MG/1
300 CAPSULE ORAL
Qty: 0 | Refills: 0 | Status: DISCONTINUED | OUTPATIENT
Start: 2018-05-08 | End: 2018-05-18

## 2018-05-08 RX ORDER — MORPHINE SULFATE 50 MG/1
200 CAPSULE, EXTENDED RELEASE ORAL THREE TIMES A DAY
Qty: 0 | Refills: 0 | Status: DISCONTINUED | OUTPATIENT
Start: 2018-05-08 | End: 2018-05-08

## 2018-05-08 RX ORDER — IPRATROPIUM/ALBUTEROL SULFATE 18-103MCG
3 AEROSOL WITH ADAPTER (GRAM) INHALATION EVERY 6 HOURS
Qty: 0 | Refills: 0 | Status: DISCONTINUED | OUTPATIENT
Start: 2018-05-08 | End: 2018-05-18

## 2018-05-08 RX ORDER — METOPROLOL TARTRATE 50 MG
100 TABLET ORAL DAILY
Qty: 0 | Refills: 0 | Status: DISCONTINUED | OUTPATIENT
Start: 2018-05-08 | End: 2018-05-18

## 2018-05-08 RX ORDER — MORPHINE SULFATE 50 MG/1
100 CAPSULE, EXTENDED RELEASE ORAL EVERY 12 HOURS
Qty: 0 | Refills: 0 | Status: DISCONTINUED | OUTPATIENT
Start: 2018-05-08 | End: 2018-05-08

## 2018-05-08 RX ORDER — DOCUSATE SODIUM 100 MG
100 CAPSULE ORAL THREE TIMES A DAY
Qty: 0 | Refills: 0 | Status: DISCONTINUED | OUTPATIENT
Start: 2018-05-08 | End: 2018-05-18

## 2018-05-08 RX ORDER — SENNA PLUS 8.6 MG/1
2 TABLET ORAL AT BEDTIME
Qty: 0 | Refills: 0 | Status: DISCONTINUED | OUTPATIENT
Start: 2018-05-08 | End: 2018-05-18

## 2018-05-08 RX ORDER — DULOXETINE HYDROCHLORIDE 30 MG/1
60 CAPSULE, DELAYED RELEASE ORAL DAILY
Qty: 0 | Refills: 0 | Status: DISCONTINUED | OUTPATIENT
Start: 2018-05-08 | End: 2018-05-18

## 2018-05-08 RX ORDER — DIPHENHYDRAMINE HYDROCHLORIDE AND LIDOCAINE HYDROCHLORIDE AND ALUMINUM HYDROXIDE AND MAGNESIUM HYDRO
5 KIT EVERY 6 HOURS
Qty: 0 | Refills: 0 | Status: DISCONTINUED | OUTPATIENT
Start: 2018-05-08 | End: 2018-05-18

## 2018-05-08 RX ORDER — MORPHINE SULFATE 50 MG/1
15 CAPSULE, EXTENDED RELEASE ORAL EVERY 8 HOURS
Qty: 0 | Refills: 0 | Status: DISCONTINUED | OUTPATIENT
Start: 2018-05-08 | End: 2018-05-08

## 2018-05-08 RX ORDER — HYDROMORPHONE HYDROCHLORIDE 2 MG/ML
8 INJECTION INTRAMUSCULAR; INTRAVENOUS; SUBCUTANEOUS EVERY 4 HOURS
Qty: 0 | Refills: 0 | Status: DISCONTINUED | OUTPATIENT
Start: 2018-05-08 | End: 2018-05-15

## 2018-05-08 RX ORDER — HYDROMORPHONE HYDROCHLORIDE 2 MG/ML
8 INJECTION INTRAMUSCULAR; INTRAVENOUS; SUBCUTANEOUS EVERY 6 HOURS
Qty: 0 | Refills: 0 | Status: DISCONTINUED | OUTPATIENT
Start: 2018-05-08 | End: 2018-05-08

## 2018-05-08 RX ORDER — FLUCONAZOLE 150 MG/1
100 TABLET ORAL EVERY 24 HOURS
Qty: 0 | Refills: 0 | Status: DISCONTINUED | OUTPATIENT
Start: 2018-05-09 | End: 2018-05-09

## 2018-05-08 RX ADMIN — Medication 100 MILLIGRAM(S): at 14:53

## 2018-05-08 RX ADMIN — GABAPENTIN 300 MILLIGRAM(S): 400 CAPSULE ORAL at 17:53

## 2018-05-08 RX ADMIN — SODIUM CHLORIDE 75 MILLILITER(S): 9 INJECTION INTRAMUSCULAR; INTRAVENOUS; SUBCUTANEOUS at 03:54

## 2018-05-08 RX ADMIN — Medication 10 MILLIGRAM(S): at 14:42

## 2018-05-08 RX ADMIN — HYDROMORPHONE HYDROCHLORIDE 4 MILLIGRAM(S): 2 INJECTION INTRAMUSCULAR; INTRAVENOUS; SUBCUTANEOUS at 12:51

## 2018-05-08 RX ADMIN — MORPHINE SULFATE 200 MILLIGRAM(S): 50 CAPSULE, EXTENDED RELEASE ORAL at 22:08

## 2018-05-08 RX ADMIN — FLUCONAZOLE 50 MILLIGRAM(S): 150 TABLET ORAL at 20:55

## 2018-05-08 RX ADMIN — Medication 1 MILLIGRAM(S): at 17:53

## 2018-05-08 RX ADMIN — Medication 100 MILLIGRAM(S): at 21:35

## 2018-05-08 RX ADMIN — MORPHINE SULFATE 200 MILLIGRAM(S): 50 CAPSULE, EXTENDED RELEASE ORAL at 21:38

## 2018-05-08 RX ADMIN — SENNA PLUS 2 TABLET(S): 8.6 TABLET ORAL at 21:38

## 2018-05-08 RX ADMIN — RIVAROXABAN 20 MILLIGRAM(S): KIT at 21:35

## 2018-05-08 RX ADMIN — HYDROMORPHONE HYDROCHLORIDE 4 MILLIGRAM(S): 2 INJECTION INTRAMUSCULAR; INTRAVENOUS; SUBCUTANEOUS at 12:21

## 2018-05-08 RX ADMIN — Medication 500000 UNIT(S): at 14:54

## 2018-05-08 RX ADMIN — HYDROMORPHONE HYDROCHLORIDE 4 MILLIGRAM(S): 2 INJECTION INTRAMUSCULAR; INTRAVENOUS; SUBCUTANEOUS at 03:52

## 2018-05-08 RX ADMIN — Medication 500000 UNIT(S): at 00:04

## 2018-05-08 RX ADMIN — HYDROMORPHONE HYDROCHLORIDE 4 MILLIGRAM(S): 2 INJECTION INTRAMUSCULAR; INTRAVENOUS; SUBCUTANEOUS at 04:10

## 2018-05-08 RX ADMIN — DULOXETINE HYDROCHLORIDE 60 MILLIGRAM(S): 30 CAPSULE, DELAYED RELEASE ORAL at 12:15

## 2018-05-08 RX ADMIN — Medication 500000 UNIT(S): at 05:50

## 2018-05-08 RX ADMIN — Medication 500000 UNIT(S): at 21:35

## 2018-05-08 RX ADMIN — MORPHINE SULFATE 200 MILLIGRAM(S): 50 CAPSULE, EXTENDED RELEASE ORAL at 18:15

## 2018-05-08 RX ADMIN — Medication 100 MILLIGRAM(S): at 14:55

## 2018-05-08 NOTE — H&P ADULT - NSHPREVIEWOFSYSTEMS_GEN_ALL_CORE
General: weight gain  HEENT: hoarsness, difficult swallowing  Cardiac: chest pain at rest, no palpitation  Respiratory: no SOB, cough is non productive 2/2 COPD  GI: difficulty swallowing  MSK: lower back pain  Neuro: none

## 2018-05-08 NOTE — PROGRESS NOTE ADULT - ASSESSMENT
47y old Female with laryngeal thrush    #) Laryngeal thrush  - improving  - ENT following:   - continue nystatin swish & swallow TID x1 week    - on IV fluids for dysphagia, will discontinue once pt able to tolerate PO diet  - pain control    #) DVT Ppx  - enoxaparin s/q    #) Full code status          GEN: Alert, awake, NAD  HEENT: +hoarseness, neck supple without LAD. Resp even/unlabored.    Patient scoped at bedside, net improvement in pharyngolaryngeal thrush.  Recommend continuing Nystatin TID for one more week. 47y old Female with laryngeal thrush    #) Laryngeal thrush  - improving  - ENT following: pt scoped at bedside, net improvement in pharyngolaryngeal thrush.  - continue nystatin swish & swallow TID x1 week    - on IV fluids for dysphagia, will discontinue once pt able to tolerate PO diet  - pain control    #) DVT Ppx  - enoxaparin s/q    #) Full code status 47y old Female with laryngeal thrush    #) Laryngeal Candidiasis (patient treated previously with diflucan 200mg q24 for 10 days completed on 05/04/18)  - pain improving  - ENT following: pt scoped at bedside, net improvement in pharyngolaryngeal thrush  - continue nystatin swish & swallow TID x1 week   - start Diflucan I/V 100mg q24   - will progress diet  - pain control    #) Throat cancer  - s/p excision in Nov 2017  - follows Dr. Sultana  - last chemo Jan 2018  - outpt follow up     #) DVT Ppx  - enoxaparin s/q    #) Full code status 47y old Female with laryngeal thrush    #) Laryngeal Candidiasis (patient treated previously with diflucan 200mg q24 for 10 days completed on 05/04/18)  - pain improving  - ENT following: pt scoped at bedside, net improvement in pharyngolaryngeal thrush  - continue nystatin swish & swallow TID x1 week   - start Diflucan I/V 100mg q24   - will progress diet  - pain control    #) Hypertension  - continue metoprolol  - monitor blood pressure    #) Throat cancer  - s/p excision in Nov 2017  - follows Dr. Sultana  - last chemo Jan 2018  - outpt follow up     #) H/o DVT  - continue Xarelto    #) Full code status 47y old Female with laryngeal thrush    #) Laryngeal Candidiasis (patient treated previously with diflucan 200mg q24 for 10 days completed on 05/04/18)  - pain improving  - ENT following: pt scoped at bedside, net improvement in pharyngolaryngeal thrush  - continue nystatin swish & swallow TID x1 week   - start Diflucan I/V 100mg q24   - will progress diet  - pain control    #) Hypertension  - continue metoprolol and hydralazine  - monitor blood pressure    #) Throat cancer  - s/p excision in Nov 2017  - follows Dr. Sultana  - last chemo Jan 2018  - outpt follow up     #) H/o DVT  - continue Xarelto    #) Full code status

## 2018-05-08 NOTE — PROGRESS NOTE ADULT - SUBJECTIVE AND OBJECTIVE BOX
SUBJECTIVE:    Patient is a 47y old Female who presents with a chief complaint of Fatigue and generalized weakness (08 May 2018 05:46)    Currently admitted to medicine with the primary diagnosis of Thrush     Today is hospital day 1d. This morning she is resting comfortably in bed and reports no new issues or overnight events.     PAST MEDICAL & SURGICAL HISTORY  DVT (deep venous thrombosis)  COPD (chronic obstructive pulmonary disease)  CHF (congestive heart failure)  HTN (hypertension)  RA (rheumatoid arthritis)  Throat cancer  AICD (automatic cardioverter/defibrillator) present  Lupus    SOCIAL HISTORY:  Negative for smoking/alcohol/drug use.     ALLERGIES:  Avelox (Other)  Plaquenil (Other)  Vantin (Other (Mild))    MEDICATIONS:  STANDING MEDICATIONS  morphine ER Tablet 200 milliGRAM(s) Oral three times a day  nystatin    Suspension 588441 Unit(s) Swish and Swallow three times a day  sodium chloride 0.9%. 1000 milliLiter(s) IV Continuous <Continuous>    PRN MEDICATIONS  HYDROmorphone   Tablet 4 milliGRAM(s) Oral every 2 hours PRN    VITALS:   T(F): 98.9  HR: 74  BP: 121/77  RR: 18  SpO2: 100%    LABS:                        14.5   10.12 )-----------( 167      ( 07 May 2018 20:38 )             44.5     05-07    142  |  98  |  12  ----------------------------<  107<H>  3.9   |  31  |  1.0    Ca    9.1      07 May 2018 20:38      PT/INR - ( 07 May 2018 20:38 )   PT: 11.10 sec;   INR: 1.03 ratio         PTT - ( 07 May 2018 20:38 )  PTT:47.1 sec    RADIOLOGY:    PHYSICAL EXAM:  GEN: No acute distress  LUNGS: Clear to auscultation bilaterally   HEART: S1/S2 present. RRR.   ABD: Soft, non-tender, non-distended. Bowel sounds present  EXT: NC/NC/NE/2+PP/HUNTER  NEURO: AAOX3 SUBJECTIVE:    Patient is a 47y old Female who presents with a chief complaint of Fatigue and generalized weakness (08 May 2018 05:46)    Currently admitted to medicine with the primary diagnosis of Thrush     Today is hospital day 1d. This morning she is resting comfortably in bed and reports no new issues or overnight events.     PAST MEDICAL & SURGICAL HISTORY  DVT (deep venous thrombosis)  COPD (chronic obstructive pulmonary disease)  CHF (congestive heart failure)  HTN (hypertension)  RA (rheumatoid arthritis)  Throat cancer  AICD (automatic cardioverter/defibrillator) present  Lupus    SOCIAL HISTORY:  Negative for smoking/alcohol/drug use.     ALLERGIES:  Avelox (Other)  Plaquenil (Other)  Vantin (Other (Mild))    MEDICATIONS:  STANDING MEDICATIONS  morphine ER Tablet 200 milliGRAM(s) Oral three times a day  nystatin    Suspension [id number] Unit(s) Swish and Swallow three times a day  sodium chloride 0.9%. 1000 milliLiter(s) IV Continuous <Continuous>    PRN MEDICATIONS  HYDROmorphone   Tablet 4 milliGRAM(s) Oral every 2 hours PRN    VITALS:   T(F): 98.9  HR: 74  BP: 121/77  RR: 18  SpO2: 100%    LABS:                        14.5   10.12 )-----------( 167      ( 07 May 2018 20:38 )             44.5     05-07    142  |  98  |  12  ----------------------------<  107<H>  3.9   |  31  |  1.0    Ca    9.1      07 May 2018 20:38      PT/INR - ( 07 May 2018 20:38 )   PT: 11.10 sec;   INR: 1.03 ratio         PTT - ( 07 May 2018 20:38 )  PTT:47.1 sec    RADIOLOGY:    PHYSICAL EXAM:  GEN: No acute distress  HEENT: +hoarseness, neck supple without LAD  LUNGS: Clear to auscultation bilaterally   HEART: S1/S2 present. RRR.   ABD: Soft, non-tender, non-distended. Bowel sounds present  EXT: NC/NC/NE/2+PP/HUNTER  NEURO: AAOX3

## 2018-05-08 NOTE — H&P ADULT - HISTORY OF PRESENT ILLNESS
46 yo female with significant PMH of CHF s/p AICD, DVT on xarelto, COPD, RA, laryngeal cancer s/p excision in november, s/p  2 week history of esophagea 46 yo female with significant PMH of CHF s/p AICD, DVT on xarelto, COPD, RA, laryngeal cancer s/p excision in november, s/p  2 week history of esophageal thrush 2 weeks ago status post PO diflucan treatment, presented for worsening fatigue and generalized weakness. She also endorses recent chest pain when eating and taking a deep breath. Shealso noticed 20 lb weight gain in the past two months. She denies chest pain on exertion or shortness of breath. She was seen in ED by ENT and bedside visualisation of the pharynx showed resolving infection, though patient still has hoarsness of her voice.

## 2018-05-08 NOTE — H&P ADULT - ATTENDING COMMENTS
Mrs Bauman with PMH Throat Cancer s/p ChemoRx with Dr. Sultana last dose ?Jan 2018, HTN, recent oral candidiasis s/p course of diflucan.   Now pt p/w throat pain and dryness, unable to eat solid food. Pt was scoped by ENT in the ER and found to have severe Laryngeal Candidiasis.    Plan: ID Consult, in the interim will treat with Diflucan 100mg IV qd. Stop IVF. Encourage Oral hydration. Continue with all other care per orders. Elective ENT and Oncology follow ups.

## 2018-05-08 NOTE — H&P ADULT - NSHPPHYSICALEXAM_GEN_ALL_CORE
T(C): 37.2 (08 May 2018 02:15), Max: 37.2 (08 May 2018 02:15)  T(F): 98.9 (08 May 2018 02:15), Max: 98.9 (08 May 2018 02:15)  HR: 74 (08 May 2018 07:23) (74 - 118)  BP: 121/77 (08 May 2018 07:23) (120/82 - 167/98)  BP(mean): --  ABP: --  ABP(mean): --  RR: 18 (08 May 2018 02:15) (18 - 20)  SpO2: 100% (08 May 2018 02:09) (96% - 100%)      General: NAD  HEENT: hoarsness, no thrush, no JULIÁN  Lymph nodes: none  Lungs: CTA BL  Cardiovascular: rS1S2 no murmur  Abdomen: soft non tender not distended  Extremities: +PP no edema  Skin: wnl  Neurological: AAO3 no focal deficit

## 2018-05-08 NOTE — H&P ADULT - NSHPLABSRESULTS_GEN_ALL_CORE
14.5   10.12 )-----------( 167      ( 07 May 2018 20:38 )             44.5     142  |  98  |  12  ----------------------------<  107<H>  3.9   |  31  |  1.0

## 2018-05-08 NOTE — H&P ADULT - ASSESSMENT
47y old Female with laryngeal thrush    #) Laryngeal Candidiasis:  - pain improving  - ENT following: pt scoped at bedside, net improvement in pharyngolaryngeal thrush  - continue nystatin swish & swallow TID x1 week   - start Diflucan IV 100mg q24   - will progress diet  - pain control    #) Hypertension  - continue metoprolol  - monitor blood pressure    #) Throat cancer  - s/p excision in Nov 2017  - follows Dr. Sultana  - last chemo Jan 2018  - outpt follow up     #) H/o DVT  - continue Xarelto    #) Full code status

## 2018-05-09 LAB
ANION GAP SERPL CALC-SCNC: 11 MMOL/L — SIGNIFICANT CHANGE UP (ref 7–14)
BASOPHILS # BLD AUTO: 0.03 K/UL — SIGNIFICANT CHANGE UP (ref 0–0.2)
BASOPHILS NFR BLD AUTO: 0.4 % — SIGNIFICANT CHANGE UP (ref 0–1)
BUN SERPL-MCNC: 11 MG/DL — SIGNIFICANT CHANGE UP (ref 10–20)
CALCIUM SERPL-MCNC: 9 MG/DL — SIGNIFICANT CHANGE UP (ref 8.5–10.1)
CHLORIDE SERPL-SCNC: 100 MMOL/L — SIGNIFICANT CHANGE UP (ref 98–110)
CO2 SERPL-SCNC: 29 MMOL/L — SIGNIFICANT CHANGE UP (ref 17–32)
CREAT SERPL-MCNC: 0.9 MG/DL — SIGNIFICANT CHANGE UP (ref 0.7–1.5)
EOSINOPHIL # BLD AUTO: 0.24 K/UL — SIGNIFICANT CHANGE UP (ref 0–0.7)
EOSINOPHIL NFR BLD AUTO: 3.4 % — SIGNIFICANT CHANGE UP (ref 0–8)
GLUCOSE SERPL-MCNC: 91 MG/DL — SIGNIFICANT CHANGE UP (ref 70–99)
HCT VFR BLD CALC: 38.1 % — SIGNIFICANT CHANGE UP (ref 37–47)
HGB BLD-MCNC: 12.2 G/DL — SIGNIFICANT CHANGE UP (ref 12–16)
IMM GRANULOCYTES NFR BLD AUTO: 0.4 % — HIGH (ref 0.1–0.3)
LYMPHOCYTES # BLD AUTO: 2.4 K/UL — SIGNIFICANT CHANGE UP (ref 1.2–3.4)
LYMPHOCYTES # BLD AUTO: 33.8 % — SIGNIFICANT CHANGE UP (ref 20.5–51.1)
MCHC RBC-ENTMCNC: 27.7 PG — SIGNIFICANT CHANGE UP (ref 27–31)
MCHC RBC-ENTMCNC: 32 G/DL — SIGNIFICANT CHANGE UP (ref 32–37)
MCV RBC AUTO: 86.6 FL — SIGNIFICANT CHANGE UP (ref 81–99)
MONOCYTES # BLD AUTO: 0.78 K/UL — HIGH (ref 0.1–0.6)
MONOCYTES NFR BLD AUTO: 11 % — HIGH (ref 1.7–9.3)
NEUTROPHILS # BLD AUTO: 3.62 K/UL — SIGNIFICANT CHANGE UP (ref 1.4–6.5)
NEUTROPHILS NFR BLD AUTO: 51 % — SIGNIFICANT CHANGE UP (ref 42.2–75.2)
PLATELET # BLD AUTO: 145 K/UL — SIGNIFICANT CHANGE UP (ref 130–400)
POTASSIUM SERPL-MCNC: 4.3 MMOL/L — SIGNIFICANT CHANGE UP (ref 3.5–5)
POTASSIUM SERPL-SCNC: 4.3 MMOL/L — SIGNIFICANT CHANGE UP (ref 3.5–5)
RBC # BLD: 4.4 M/UL — SIGNIFICANT CHANGE UP (ref 4.2–5.4)
RBC # FLD: 13.2 % — SIGNIFICANT CHANGE UP (ref 11.5–14.5)
SODIUM SERPL-SCNC: 140 MMOL/L — SIGNIFICANT CHANGE UP (ref 135–146)
TSH SERPL-MCNC: 2.19 UIU/ML — SIGNIFICANT CHANGE UP (ref 0.27–4.2)
WBC # BLD: 7.1 K/UL — SIGNIFICANT CHANGE UP (ref 4.8–10.8)
WBC # FLD AUTO: 7.1 K/UL — SIGNIFICANT CHANGE UP (ref 4.8–10.8)

## 2018-05-09 RX ORDER — DIPHENHYDRAMINE HCL 50 MG
25 CAPSULE ORAL ONCE
Qty: 0 | Refills: 0 | Status: COMPLETED | OUTPATIENT
Start: 2018-05-09 | End: 2018-05-09

## 2018-05-09 RX ORDER — MICAFUNGIN SODIUM 100 MG/1
100 INJECTION, POWDER, LYOPHILIZED, FOR SOLUTION INTRAVENOUS ONCE
Qty: 0 | Refills: 0 | Status: COMPLETED | OUTPATIENT
Start: 2018-05-09 | End: 2018-05-09

## 2018-05-09 RX ORDER — MICAFUNGIN SODIUM 100 MG/1
100 INJECTION, POWDER, LYOPHILIZED, FOR SOLUTION INTRAVENOUS EVERY 24 HOURS
Qty: 0 | Refills: 0 | Status: DISCONTINUED | OUTPATIENT
Start: 2018-05-10 | End: 2018-05-17

## 2018-05-09 RX ORDER — MICAFUNGIN SODIUM 100 MG/1
INJECTION, POWDER, LYOPHILIZED, FOR SOLUTION INTRAVENOUS
Qty: 0 | Refills: 0 | Status: DISCONTINUED | OUTPATIENT
Start: 2018-05-09 | End: 2018-05-17

## 2018-05-09 RX ORDER — ATORVASTATIN CALCIUM 80 MG/1
80 TABLET, FILM COATED ORAL AT BEDTIME
Qty: 0 | Refills: 0 | Status: DISCONTINUED | OUTPATIENT
Start: 2018-05-09 | End: 2018-05-09

## 2018-05-09 RX ADMIN — Medication 25 MILLIGRAM(S): at 06:45

## 2018-05-09 RX ADMIN — FLUCONAZOLE 50 MILLIGRAM(S): 150 TABLET ORAL at 12:02

## 2018-05-09 RX ADMIN — DULOXETINE HYDROCHLORIDE 60 MILLIGRAM(S): 30 CAPSULE, DELAYED RELEASE ORAL at 12:03

## 2018-05-09 RX ADMIN — Medication 1 MILLIGRAM(S): at 00:12

## 2018-05-09 RX ADMIN — MICAFUNGIN SODIUM 105 MILLIGRAM(S): 100 INJECTION, POWDER, LYOPHILIZED, FOR SOLUTION INTRAVENOUS at 17:28

## 2018-05-09 RX ADMIN — MORPHINE SULFATE 200 MILLIGRAM(S): 50 CAPSULE, EXTENDED RELEASE ORAL at 14:55

## 2018-05-09 RX ADMIN — DIPHENHYDRAMINE HYDROCHLORIDE AND LIDOCAINE HYDROCHLORIDE AND ALUMINUM HYDROXIDE AND MAGNESIUM HYDRO 5 MILLILITER(S): KIT at 00:12

## 2018-05-09 RX ADMIN — GABAPENTIN 300 MILLIGRAM(S): 400 CAPSULE ORAL at 17:29

## 2018-05-09 RX ADMIN — MORPHINE SULFATE 200 MILLIGRAM(S): 50 CAPSULE, EXTENDED RELEASE ORAL at 22:46

## 2018-05-09 RX ADMIN — GABAPENTIN 300 MILLIGRAM(S): 400 CAPSULE ORAL at 06:44

## 2018-05-09 RX ADMIN — Medication 500000 UNIT(S): at 22:15

## 2018-05-09 RX ADMIN — MORPHINE SULFATE 200 MILLIGRAM(S): 50 CAPSULE, EXTENDED RELEASE ORAL at 10:48

## 2018-05-09 RX ADMIN — RIVAROXABAN 20 MILLIGRAM(S): KIT at 22:15

## 2018-05-09 RX ADMIN — DIPHENHYDRAMINE HYDROCHLORIDE AND LIDOCAINE HYDROCHLORIDE AND ALUMINUM HYDROXIDE AND MAGNESIUM HYDRO 5 MILLILITER(S): KIT at 17:29

## 2018-05-09 RX ADMIN — Medication 100 MILLIGRAM(S): at 06:44

## 2018-05-09 RX ADMIN — DIPHENHYDRAMINE HYDROCHLORIDE AND LIDOCAINE HYDROCHLORIDE AND ALUMINUM HYDROXIDE AND MAGNESIUM HYDRO 5 MILLILITER(S): KIT at 12:02

## 2018-05-09 RX ADMIN — Medication 1 MILLIGRAM(S): at 14:26

## 2018-05-09 RX ADMIN — Medication 25 MILLIGRAM(S): at 17:29

## 2018-05-09 RX ADMIN — Medication 500000 UNIT(S): at 06:45

## 2018-05-09 RX ADMIN — Medication 1 MILLIGRAM(S): at 06:54

## 2018-05-09 RX ADMIN — DIPHENHYDRAMINE HYDROCHLORIDE AND LIDOCAINE HYDROCHLORIDE AND ALUMINUM HYDROXIDE AND MAGNESIUM HYDRO 5 MILLILITER(S): KIT at 23:20

## 2018-05-09 RX ADMIN — DIPHENHYDRAMINE HYDROCHLORIDE AND LIDOCAINE HYDROCHLORIDE AND ALUMINUM HYDROXIDE AND MAGNESIUM HYDRO 5 MILLILITER(S): KIT at 06:45

## 2018-05-09 RX ADMIN — MORPHINE SULFATE 200 MILLIGRAM(S): 50 CAPSULE, EXTENDED RELEASE ORAL at 22:16

## 2018-05-09 RX ADMIN — MORPHINE SULFATE 200 MILLIGRAM(S): 50 CAPSULE, EXTENDED RELEASE ORAL at 06:45

## 2018-05-09 RX ADMIN — Medication 100 MILLIGRAM(S): at 22:15

## 2018-05-09 RX ADMIN — Medication 100 MILLIGRAM(S): at 14:26

## 2018-05-09 RX ADMIN — Medication 500000 UNIT(S): at 14:26

## 2018-05-09 RX ADMIN — MORPHINE SULFATE 200 MILLIGRAM(S): 50 CAPSULE, EXTENDED RELEASE ORAL at 14:25

## 2018-05-09 RX ADMIN — SENNA PLUS 2 TABLET(S): 8.6 TABLET ORAL at 22:15

## 2018-05-09 NOTE — CONSULT NOTE ADULT - ASSESSMENT
47y old Female with laryngeal thrush    #) Laryngeal Candidiasis   - Completed 10 day course of diflucan 200mg q24 on 05/04/18  - ENT following: improved pharyngolaryngeal thrush on bedside scope  - Started on nystatin TID x1 week & Diflucan IV 100mg q24 yesterday  - Mechanical soft diet  - GI consulted for possible biospy    #) Throat cancer  - s/p excision in Nov 2017, Last chemo Jan 2018  - Follows Dr. Sultana 47y old Female with laryngeal thrush    IMPRESSION:  Laryngeal candidiasis with possible esophagitis.    RECOMMENDATIONS:  Micafungin 100 mg iv q24h  Continue with oral Nystatin  Hold the steroid inhalers.  GI for possible biopsy with fungal cultures for sensitivities.  HIV test.

## 2018-05-09 NOTE — PROGRESS NOTE ADULT - ASSESSMENT
47y old Female with laryngeal thrush    #) Laryngeal Candidiasis (patient treated previously with diflucan 200mg q24 for 10 days completed on 05/04/18)  - pain improving  - ENT following: pt scoped at bedside, net improvement in pharyngolaryngeal thrush  - continue nystatin swish & swallow TID x1 week   - continue Diflucan I/V 100mg q24   - will progress diet  - pain control    #) Hypertension  - continue metoprolol and hydralazine  - monitor blood pressure    #) Throat cancer  - s/p excision in Nov 2017  - follows Dr. Sultana  - last chemo Jan 2018  - outpt follow up with hem/Onc    #) H/o DVT  - continue Xarelto    #) Full code status 47y old Female with laryngeal thrush    #) Laryngeal Candidiasis (patient treated previously with diflucan 200mg q24 for 10 days completed on 05/04/18)  - pain improving  - ENT following: pt scoped at bedside, net improvement in pharyngolaryngeal thrush  - continue nystatin swish & swallow TID x1 week   - continue Diflucan I/V 100mg q24   - mechanical soft diet  - pain control  - ID consult pending  - GI consulted for possible biospy    #) Hypertension  - continue metoprolol and hydralazine  - monitor blood pressure    #) Throat cancer  - s/p excision in Nov 2017  - follows Dr. Sultana  - last chemo Jan 2018  - outpt follow up with hem/Onc    #) H/o DVT  - continue Xarelto    #) Full code status 47y old Female with laryngeal thrush    #) Laryngeal Candidiasis (patient treated previously with diflucan 200mg q24 for 10 days completed on 05/04/18)  - pain improving  - ENT following: pt scoped at bedside, net improvement in pharyngolaryngeal thrush  - continue nystatin swish & swallow TID x1 week   - mechanical soft diet  - pain control  - ID consult appreciated: start Micafungin 100 mg iv q24h  - GI consulted for possible biopsy  - f/u HIV test    #) Hypertension  - continue metoprolol and hydralazine  - monitor blood pressure    #) Throat cancer  - s/p excision in Nov 2017  - follows Dr. Sultana  - last chemo Jan 2018  - outpt follow up with hem/Onc    #) H/o DVT  - continue Xarelto    #) Full code status

## 2018-05-09 NOTE — CONSULT NOTE ADULT - SUBJECTIVE AND OBJECTIVE BOX
SUBJECTIVE:    Patient is a 47y old Female who presents with a chief complaint of Fatigue and generalized weakness (08 May 2018 05:46)    Currently admitted to medicine with the primary diagnosis of Thrush     Today is hospital day 2d. Per nursing staff pt described no improvement in throat pain.     PAST MEDICAL & SURGICAL HISTORY  DVT (deep venous thrombosis)  COPD (chronic obstructive pulmonary disease)  CHF (congestive heart failure)  HTN (hypertension)  RA (rheumatoid arthritis)  Throat cancer  AICD (automatic cardioverter/defibrillator) present  Lupus  History of laryngeal cancer  S/P cholecystectomy  S/P appendectomy    SOCIAL HISTORY:  Negative for smoking/alcohol/drug use.     ALLERGIES:  Avelox (Other)  Plaquenil (Other)  Vantin (Other (Mild))    MEDICATIONS:  STANDING MEDICATIONS  ALBUTerol/ipratropium for Nebulization 3 milliLiter(s) Nebulizer every 6 hours  docusate sodium 100 milliGRAM(s) Oral three times a day  DULoxetine 60 milliGRAM(s) Oral daily  FIRST- Mouthwash  BLM 5 milliLiter(s) Swish and Swallow every 6 hours  fluconAZOLE IVPB 100 milliGRAM(s) IV Intermittent every 24 hours  fluconAZOLE IVPB      gabapentin 300 milliGRAM(s) Oral two times a day  hydrALAZINE 25 milliGRAM(s) Oral every 12 hours  metoprolol succinate  milliGRAM(s) Oral daily  morphine ER Tablet 200 milliGRAM(s) Oral three times a day  nystatin    Suspension [id number] Unit(s) Swish and Swallow three times a day  rivaroxaban 20 milliGRAM(s) Oral at bedtime  senna 2 Tablet(s) Oral at bedtime    PRN MEDICATIONS  ALPRAZolam 1 milliGRAM(s) Oral three times a day PRN  HYDROmorphone   Tablet 8 milliGRAM(s) Oral every 4 hours PRN    VITALS:   T(F): 97.7  HR: 60  BP: 132/77  RR: 18  SpO2: 94%    LABS:                        12.2   7.10  )-----------( 145      ( 09 May 2018 07:10 )             38.1     05-09    140  |  100  |  11  ----------------------------<  91  4.3   |  29  |  0.9    Ca    9.0      09 May 2018 07:10      PT/INR - ( 07 May 2018 20:38 )   PT: 11.10 sec;   INR: 1.03 ratio         PTT - ( 07 May 2018 20:38 )  PTT:47.1 sec      Troponin T, Serum: <0.01 ng/mL (05-08-18 @ 11:25)      CARDIAC MARKERS ( 08 May 2018 11:25 )  x     / <0.01 ng/mL / x     / x     / x          RADIOLOGY:    < from: Xray Chest 2 Views PA/Lat (05.07.18 @ 20:58) >  Impression:      No radiographic evidence of acute cardiopulmonary disease.    < end of copied text >      PHYSICAL EXAM:  GEN: No acute distress  LUNGS: Clear to auscultation bilaterally   HEART: S1/S2 present. RRR.   ABD: Soft, non-tender, non-distended. Bowel sounds present  EXT: NC/NC/NE/2+PP/HUNTER  NEURO: AAOX3

## 2018-05-09 NOTE — PROGRESS NOTE ADULT - SUBJECTIVE AND OBJECTIVE BOX
SUBJECTIVE:    Patient is a 47y old Female who presents with a chief complaint of Fatigue and generalized weakness (08 May 2018 05:46)    Currently admitted to medicine with the primary diagnosis of Thrush     Today is hospital day 1d. This morning she is resting comfortably in bed and reports no new issues or overnight events.     PAST MEDICAL & SURGICAL HISTORY  DVT (deep venous thrombosis)  COPD (chronic obstructive pulmonary disease)  CHF (congestive heart failure)  HTN (hypertension)  RA (rheumatoid arthritis)  Throat cancer  AICD (automatic cardioverter/defibrillator) present  Lupus    SOCIAL HISTORY:  Negative for smoking/alcohol/drug use.     ALLERGIES:  Avelox (Other)  Plaquenil (Other)  Vantin (Other (Mild))    MEDICATIONS:  STANDING MEDICATIONS  morphine ER Tablet 200 milliGRAM(s) Oral three times a day  nystatin    Suspension 795423 Unit(s) Swish and Swallow three times a day  sodium chloride 0.9%. 1000 milliLiter(s) IV Continuous <Continuous>    PRN MEDICATIONS  HYDROmorphone   Tablet 4 milliGRAM(s) Oral every 2 hours PRN    VITALS:   T(F): 98.9  HR: 74  BP: 121/77  RR: 18  SpO2: 100%    LABS:                        14.5   10.12 )-----------( 167      ( 07 May 2018 20:38 )             44.5     05-07    142  |  98  |  12  ----------------------------<  107<H>  3.9   |  31  |  1.0    Ca    9.1      07 May 2018 20:38      PT/INR - ( 07 May 2018 20:38 )   PT: 11.10 sec;   INR: 1.03 ratio         PTT - ( 07 May 2018 20:38 )  PTT:47.1 sec    RADIOLOGY:    PHYSICAL EXAM:  GEN: No acute distress  HEENT: +hoarseness, neck supple without LAD  LUNGS: Clear to auscultation bilaterally   HEART: S1/S2 present. RRR.   ABD: Soft, non-tender, non-distended. Bowel sounds present  EXT: NC/NC/NE/2+PP/HUNTER  NEURO: AAOX3 SUBJECTIVE:    Patient is a 47y old Female who presents with a chief complaint of Fatigue and generalized weakness (08 May 2018 05:46)    Currently admitted to medicine with the primary diagnosis of Thrush     Today is hospital day 2d. This morning she is resting comfortably in bed and reports no new issues or overnight events. She continues to complain of throat pain with no improvement.     PAST MEDICAL & SURGICAL HISTORY  DVT (deep venous thrombosis)  COPD (chronic obstructive pulmonary disease)  CHF (congestive heart failure)  HTN (hypertension)  RA (rheumatoid arthritis)  Throat cancer  AICD (automatic cardioverter/defibrillator) present  Lupus  History of laryngeal cancer  S/P cholecystectomy  S/P appendectomy    SOCIAL HISTORY:  Negative for smoking/alcohol/drug use.     ALLERGIES:  Avelox (Other)  Plaquenil (Other)  Vantin (Other (Mild))    MEDICATIONS:  STANDING MEDICATIONS  ALBUTerol/ipratropium for Nebulization 3 milliLiter(s) Nebulizer every 6 hours  docusate sodium 100 milliGRAM(s) Oral three times a day  DULoxetine 60 milliGRAM(s) Oral daily  FIRST- Mouthwash  BLM 5 milliLiter(s) Swish and Swallow every 6 hours  fluconAZOLE IVPB 100 milliGRAM(s) IV Intermittent every 24 hours  fluconAZOLE IVPB      gabapentin 300 milliGRAM(s) Oral two times a day  hydrALAZINE 25 milliGRAM(s) Oral every 12 hours  metoprolol succinate  milliGRAM(s) Oral daily  morphine ER Tablet 200 milliGRAM(s) Oral three times a day  nystatin    Suspension [id number] Unit(s) Swish and Swallow three times a day  rivaroxaban 20 milliGRAM(s) Oral at bedtime  senna 2 Tablet(s) Oral at bedtime    PRN MEDICATIONS  ALPRAZolam 1 milliGRAM(s) Oral three times a day PRN  HYDROmorphone   Tablet 8 milliGRAM(s) Oral every 4 hours PRN    VITALS:   T(F): 97.7  HR: 60  BP: 132/77  RR: 18  SpO2: 94%    LABS:                        12.2   7.10  )-----------( 145      ( 09 May 2018 07:10 )             38.1     05-09    140  |  100  |  11  ----------------------------<  91  4.3   |  29  |  0.9    Ca    9.0      09 May 2018 07:10      PT/INR - ( 07 May 2018 20:38 )   PT: 11.10 sec;   INR: 1.03 ratio         PTT - ( 07 May 2018 20:38 )  PTT:47.1 sec      Troponin T, Serum: <0.01 ng/mL (05-08-18 @ 11:25)      CARDIAC MARKERS ( 08 May 2018 11:25 )  x     / <0.01 ng/mL / x     / x     / x          RADIOLOGY:  Xray Chest 2 Views PA/Lat (05.07.18)  No radiographic evidence of acute cardiopulmonary disease      PHYSICAL EXAM:  GEN: No acute distress, hoarseness of voice   LUNGS: Clear to auscultation bilaterally   HEART: S1/S2 present. RRR.   ABD: Soft, non-tender, non-distended. Bowel sounds present  EXT: NC/NC/NE/2+PP/HUNTER  NEURO: AAOX3

## 2018-05-09 NOTE — PROGRESS NOTE ADULT - ATTENDING COMMENTS
47y old Female  with significant PMH of CHF s/p AICD, DVT on xarelto, COPD, RA, laryngeal cancer s/p excision in november, s/p  2 week history of esophageal thrush 2 weeks ago status post PO diflucan treatment, presented for worsening fatigue and generalized weakness and odynophagia. Admitted for laryngeal thrush    #) Laryngeal Candidiasis (patient treated previously with diflucan 200mg q24 for 10 days completed on 05/04/18)  - ENT following: pt scoped at bedside, net improvement in pharyngolaryngeal thrush  - pain improving  - continue nystatin swish & swallow TID x1 week   - mechanical soft diet  - pain control  - ID consult appreciated: start Micafungin 100 mg iv q24h  - GI consulted for possible biopsy  - f/u HIV test    #) Hypertension  - continue metoprolol and hydralazine  - monitor blood pressure    #) Throat cancer  - s/p excision in Nov 2017  - follows Dr. Sultana  - last chemo Jan 2018  - outpt follow up with hem/Onc    #) H/o DVT  - continue Xarelto    #) Full code status 47y old Female  with significant PMH of CHF s/p AICD, DVT on xarelto, COPD, RA, laryngeal cancer s/p excision in november, s/p  2 week history of esophageal thrush 2 weeks ago status post PO diflucan treatment, presented for worsening fatigue and generalized weakness and odynophagia. Admitted for laryngeal thrush    Patient was unavailable for exam today. Discussed the case with housestaff and nurses.     #) Laryngeal Candidiasis (patient treated previously with diflucan 200mg q24 for 10 days completed on 05/04/18)  - ENT following: pt scoped at bedside, net improvement in pharyngolaryngeal thrush  - pain improving  - continue nystatin swish & swallow TID x1 week   - mechanical soft diet  - pain control  - ID consult appreciated: start Micafungin 100 mg iv q24h  - GI consulted for possible biopsy  - f/u HIV test    #) Hypertension  - continue metoprolol and hydralazine  - monitor blood pressure    #) Throat cancer  - s/p excision in Nov 2017  - follows Dr. Sultana  - last chemo Jan 2018  - outpt follow up with hem/Onc    #) H/o DVT  - continue Xarelto    #) Full code status

## 2018-05-10 LAB
ANION GAP SERPL CALC-SCNC: 13 MMOL/L — SIGNIFICANT CHANGE UP (ref 7–14)
BASOPHILS # BLD AUTO: 0.05 K/UL — SIGNIFICANT CHANGE UP (ref 0–0.2)
BASOPHILS NFR BLD AUTO: 0.5 % — SIGNIFICANT CHANGE UP (ref 0–1)
BUN SERPL-MCNC: 13 MG/DL — SIGNIFICANT CHANGE UP (ref 10–20)
CALCIUM SERPL-MCNC: 9.4 MG/DL — SIGNIFICANT CHANGE UP (ref 8.5–10.1)
CHLORIDE SERPL-SCNC: 101 MMOL/L — SIGNIFICANT CHANGE UP (ref 98–110)
CO2 SERPL-SCNC: 29 MMOL/L — SIGNIFICANT CHANGE UP (ref 17–32)
CREAT SERPL-MCNC: 0.9 MG/DL — SIGNIFICANT CHANGE UP (ref 0.7–1.5)
EOSINOPHIL # BLD AUTO: 0.39 K/UL — SIGNIFICANT CHANGE UP (ref 0–0.7)
EOSINOPHIL NFR BLD AUTO: 3.8 % — SIGNIFICANT CHANGE UP (ref 0–8)
GLUCOSE SERPL-MCNC: 93 MG/DL — SIGNIFICANT CHANGE UP (ref 70–99)
HCT VFR BLD CALC: 41.8 % — SIGNIFICANT CHANGE UP (ref 37–47)
HGB BLD-MCNC: 13.4 G/DL — SIGNIFICANT CHANGE UP (ref 12–16)
HIV 1+2 AB+HIV1 P24 AG SERPL QL IA: SIGNIFICANT CHANGE UP
IMM GRANULOCYTES NFR BLD AUTO: 0.5 % — HIGH (ref 0.1–0.3)
LYMPHOCYTES # BLD AUTO: 3.93 K/UL — HIGH (ref 1.2–3.4)
LYMPHOCYTES # BLD AUTO: 37.8 % — SIGNIFICANT CHANGE UP (ref 20.5–51.1)
MCHC RBC-ENTMCNC: 27.7 PG — SIGNIFICANT CHANGE UP (ref 27–31)
MCHC RBC-ENTMCNC: 32.1 G/DL — SIGNIFICANT CHANGE UP (ref 32–37)
MCV RBC AUTO: 86.5 FL — SIGNIFICANT CHANGE UP (ref 81–99)
MONOCYTES # BLD AUTO: 0.89 K/UL — HIGH (ref 0.1–0.6)
MONOCYTES NFR BLD AUTO: 8.6 % — SIGNIFICANT CHANGE UP (ref 1.7–9.3)
NEUTROPHILS # BLD AUTO: 5.08 K/UL — SIGNIFICANT CHANGE UP (ref 1.4–6.5)
NEUTROPHILS NFR BLD AUTO: 48.8 % — SIGNIFICANT CHANGE UP (ref 42.2–75.2)
NRBC # BLD: 0 /100 WBCS — SIGNIFICANT CHANGE UP (ref 0–0)
PLATELET # BLD AUTO: 188 K/UL — SIGNIFICANT CHANGE UP (ref 130–400)
POTASSIUM SERPL-MCNC: 4.6 MMOL/L — SIGNIFICANT CHANGE UP (ref 3.5–5)
POTASSIUM SERPL-SCNC: 4.6 MMOL/L — SIGNIFICANT CHANGE UP (ref 3.5–5)
RBC # BLD: 4.83 M/UL — SIGNIFICANT CHANGE UP (ref 4.2–5.4)
RBC # FLD: 13.5 % — SIGNIFICANT CHANGE UP (ref 11.5–14.5)
SODIUM SERPL-SCNC: 143 MMOL/L — SIGNIFICANT CHANGE UP (ref 135–146)
WBC # BLD: 10.39 K/UL — SIGNIFICANT CHANGE UP (ref 4.8–10.8)
WBC # FLD AUTO: 10.39 K/UL — SIGNIFICANT CHANGE UP (ref 4.8–10.8)

## 2018-05-10 RX ORDER — BENZOCAINE AND MENTHOL 5; 1 G/100ML; G/100ML
1 LIQUID ORAL EVERY 4 HOURS
Qty: 0 | Refills: 0 | Status: DISCONTINUED | OUTPATIENT
Start: 2018-05-10 | End: 2018-05-10

## 2018-05-10 RX ORDER — ALPRAZOLAM 0.25 MG
2 TABLET ORAL EVERY 8 HOURS
Qty: 0 | Refills: 0 | Status: DISCONTINUED | OUTPATIENT
Start: 2018-05-10 | End: 2018-05-17

## 2018-05-10 RX ADMIN — Medication 25 MILLIGRAM(S): at 17:34

## 2018-05-10 RX ADMIN — MORPHINE SULFATE 200 MILLIGRAM(S): 50 CAPSULE, EXTENDED RELEASE ORAL at 06:17

## 2018-05-10 RX ADMIN — Medication 100 MILLIGRAM(S): at 05:47

## 2018-05-10 RX ADMIN — GABAPENTIN 300 MILLIGRAM(S): 400 CAPSULE ORAL at 17:34

## 2018-05-10 RX ADMIN — DIPHENHYDRAMINE HYDROCHLORIDE AND LIDOCAINE HYDROCHLORIDE AND ALUMINUM HYDROXIDE AND MAGNESIUM HYDRO 5 MILLILITER(S): KIT at 17:33

## 2018-05-10 RX ADMIN — Medication 500000 UNIT(S): at 05:47

## 2018-05-10 RX ADMIN — DULOXETINE HYDROCHLORIDE 60 MILLIGRAM(S): 30 CAPSULE, DELAYED RELEASE ORAL at 11:21

## 2018-05-10 RX ADMIN — Medication 2 MILLIGRAM(S): at 14:38

## 2018-05-10 RX ADMIN — HYDROMORPHONE HYDROCHLORIDE 8 MILLIGRAM(S): 2 INJECTION INTRAMUSCULAR; INTRAVENOUS; SUBCUTANEOUS at 11:20

## 2018-05-10 RX ADMIN — MORPHINE SULFATE 200 MILLIGRAM(S): 50 CAPSULE, EXTENDED RELEASE ORAL at 22:02

## 2018-05-10 RX ADMIN — GABAPENTIN 300 MILLIGRAM(S): 400 CAPSULE ORAL at 05:48

## 2018-05-10 RX ADMIN — HYDROMORPHONE HYDROCHLORIDE 8 MILLIGRAM(S): 2 INJECTION INTRAMUSCULAR; INTRAVENOUS; SUBCUTANEOUS at 19:50

## 2018-05-10 RX ADMIN — MICAFUNGIN SODIUM 105 MILLIGRAM(S): 100 INJECTION, POWDER, LYOPHILIZED, FOR SOLUTION INTRAVENOUS at 13:39

## 2018-05-10 RX ADMIN — Medication 2 MILLIGRAM(S): at 22:02

## 2018-05-10 RX ADMIN — MORPHINE SULFATE 200 MILLIGRAM(S): 50 CAPSULE, EXTENDED RELEASE ORAL at 13:37

## 2018-05-10 RX ADMIN — MORPHINE SULFATE 200 MILLIGRAM(S): 50 CAPSULE, EXTENDED RELEASE ORAL at 22:30

## 2018-05-10 RX ADMIN — DIPHENHYDRAMINE HYDROCHLORIDE AND LIDOCAINE HYDROCHLORIDE AND ALUMINUM HYDROXIDE AND MAGNESIUM HYDRO 5 MILLILITER(S): KIT at 11:21

## 2018-05-10 RX ADMIN — MORPHINE SULFATE 200 MILLIGRAM(S): 50 CAPSULE, EXTENDED RELEASE ORAL at 05:47

## 2018-05-10 RX ADMIN — HYDROMORPHONE HYDROCHLORIDE 8 MILLIGRAM(S): 2 INJECTION INTRAMUSCULAR; INTRAVENOUS; SUBCUTANEOUS at 20:20

## 2018-05-10 RX ADMIN — DIPHENHYDRAMINE HYDROCHLORIDE AND LIDOCAINE HYDROCHLORIDE AND ALUMINUM HYDROXIDE AND MAGNESIUM HYDRO 5 MILLILITER(S): KIT at 05:47

## 2018-05-10 RX ADMIN — Medication 500000 UNIT(S): at 13:38

## 2018-05-10 RX ADMIN — Medication 500000 UNIT(S): at 21:53

## 2018-05-10 RX ADMIN — Medication 1 MILLIGRAM(S): at 00:01

## 2018-05-10 NOTE — PROGRESS NOTE ADULT - SUBJECTIVE AND OBJECTIVE BOX
SUBJECTIVE:    Patient is a 47y old Female who presents with a chief complaint of Fatigue and generalized weakness (08 May 2018 05:46)    Currently admitted to medicine with the primary diagnosis of Thrush     Today is hospital day 3d. This morning she is resting comfortably in bed and reports no new issues or overnight events.     PAST MEDICAL & SURGICAL HISTORY  DVT (deep venous thrombosis)  COPD (chronic obstructive pulmonary disease)  CHF (congestive heart failure)  HTN (hypertension)  RA (rheumatoid arthritis)  Throat cancer  AICD (automatic cardioverter/defibrillator) present  Lupus  History of laryngeal cancer  S/P cholecystectomy  S/P appendectomy    SOCIAL HISTORY:  Negative for smoking/alcohol/drug use.     ALLERGIES:  Avelox (Other)  Plaquenil (Other)  Vantin (Other (Mild))    MEDICATIONS:  STANDING MEDICATIONS  ALBUTerol/ipratropium for Nebulization 3 milliLiter(s) Nebulizer every 6 hours  docusate sodium 100 milliGRAM(s) Oral three times a day  DULoxetine 60 milliGRAM(s) Oral daily  FIRST- Mouthwash  BLM 5 milliLiter(s) Swish and Swallow every 6 hours  gabapentin 300 milliGRAM(s) Oral two times a day  hydrALAZINE 25 milliGRAM(s) Oral every 12 hours  metoprolol succinate  milliGRAM(s) Oral daily  micafungin IVPB 100 milliGRAM(s) IV Intermittent every 24 hours  micafungin IVPB      morphine ER Tablet 200 milliGRAM(s) Oral three times a day  nystatin    Suspension [id number] Unit(s) Swish and Swallow three times a day  rivaroxaban 20 milliGRAM(s) Oral at bedtime  senna 2 Tablet(s) Oral at bedtime    PRN MEDICATIONS  ALPRAZolam 1 milliGRAM(s) Oral three times a day PRN  HYDROmorphone   Tablet 8 milliGRAM(s) Oral every 4 hours PRN    VITALS:   T(F): 97.4  HR: 68  BP: 97/55  RR: 18  SpO2: 98%    LABS:                        13.4   10.39 )-----------( 188      ( 10 May 2018 08:39 )             41.8     05-10    143  |  101  |  13  ----------------------------<  93  4.6   |  29  |  0.9    Ca    9.4      10 May 2018 08:39      RADIOLOGY:  Xray Chest 2 Views PA/Lat (05.07.18)  No radiographic evidence of acute cardiopulmonary disease      PHYSICAL EXAM:  GEN: No acute distress, hoarseness of voice   LUNGS: Clear to auscultation bilaterally   HEART: S1/S2 present. RRR.   ABD: Soft, non-tender, non-distended. Bowel sounds present  EXT: NC/NC/NE/2+PP/HUNTER  NEURO: AAOX3 SUBJECTIVE:    Patient is a 47y old Female who presents with a chief complaint of Fatigue and generalized weakness (08 May 2018 05:46)    Currently admitted to medicine with the primary diagnosis of Thrush     Today is hospital day 3d. This morning she is resting comfortably in bed and reports no new issues or overnight events. Patient continue to have throat pain and states its worse today.     PAST MEDICAL & SURGICAL HISTORY  DVT (deep venous thrombosis)  COPD (chronic obstructive pulmonary disease)  CHF (congestive heart failure)  HTN (hypertension)  RA (rheumatoid arthritis)  Throat cancer  AICD (automatic cardioverter/defibrillator) present  Lupus  History of laryngeal cancer  S/P cholecystectomy  S/P appendectomy    SOCIAL HISTORY:  Negative for smoking/alcohol/drug use.     ALLERGIES:  Avelox (Other)  Plaquenil (Other)  Vantin (Other (Mild))    MEDICATIONS:  STANDING MEDICATIONS  ALBUTerol/ipratropium for Nebulization 3 milliLiter(s) Nebulizer every 6 hours  docusate sodium 100 milliGRAM(s) Oral three times a day  DULoxetine 60 milliGRAM(s) Oral daily  FIRST- Mouthwash  BLM 5 milliLiter(s) Swish and Swallow every 6 hours  gabapentin 300 milliGRAM(s) Oral two times a day  hydrALAZINE 25 milliGRAM(s) Oral every 12 hours  metoprolol succinate  milliGRAM(s) Oral daily  micafungin IVPB 100 milliGRAM(s) IV Intermittent every 24 hours  micafungin IVPB      morphine ER Tablet 200 milliGRAM(s) Oral three times a day  nystatin    Suspension [id number] Unit(s) Swish and Swallow three times a day  rivaroxaban 20 milliGRAM(s) Oral at bedtime  senna 2 Tablet(s) Oral at bedtime    PRN MEDICATIONS  ALPRAZolam 1 milliGRAM(s) Oral three times a day PRN  HYDROmorphone   Tablet 8 milliGRAM(s) Oral every 4 hours PRN    VITALS:   T(F): 97.4  HR: 68  BP: 97/55  RR: 18  SpO2: 98%    LABS:                        13.4   10.39 )-----------( 188      ( 10 May 2018 08:39 )             41.8     05-10    143  |  101  |  13  ----------------------------<  93  4.6   |  29  |  0.9    Ca    9.4      10 May 2018 08:39      RADIOLOGY:  Xray Chest 2 Views PA/Lat (05.07.18)  No radiographic evidence of acute cardiopulmonary disease      PHYSICAL EXAM:  GEN: No acute distress, hoarseness of voice   LUNGS: Clear to auscultation bilaterally   HEART: S1/S2 present. RRR.   ABD: Soft, non-tender, non-distended. Bowel sounds present  EXT: NC/NC/NE/2+PP/HUNTER  NEURO: AAOX3

## 2018-05-10 NOTE — PROGRESS NOTE ADULT - ATTENDING COMMENTS
47y old Female  with significant PMH of CHF s/p AICD, DVT on xarelto, COPD, RA, laryngeal cancer s/p excision in november, s/p  2 week history of esophageal thrush 2 weeks ago status post PO diflucan treatment, presented for worsening fatigue and generalized weakness and odynophagia. Admitted for laryngeal thrush    Patient was unavailable for exam today. Discussed the case with housestaff and nurses.     #) Laryngeal Candidiasis (patient treated previously with diflucan 200mg q24 for 10 days completed on 05/04/18)  - ENT following: pt scoped at bedside, net improvement in pharyngolaryngeal thrush  - pain improving  - continue nystatin swish & swallow TID x1 week , Hurricaine q6H.   - mechanical soft diet  - pain control  - ID consult appreciated: start Micafungin 100 mg iv q24h  - GI consulted for possible biopsy. For EGD tomorrow.   Vishnu hold Xarelto for the EGD tomorrow ? Request GI to advise whether they are ok with just 1 day off xarelto.   - f/u HIV test    #) Hypertension  - continue metoprolol and hydralazine  - monitor blood pressure    #) Throat cancer  - s/p excision in Nov 2017  - follows Dr. Sultana  - last chemo Jan 2018  - outpt follow up with hem/Onc    #) H/o DVT  - Will hold Xarelto until EGD is done    #) Full code status .

## 2018-05-10 NOTE — CONSULT NOTE ADULT - ASSESSMENT
46 yo with dysphagia/probable candida  plan        egd              patient on xaralto /high risk for bleeding

## 2018-05-10 NOTE — PROGRESS NOTE ADULT - ASSESSMENT
47y old Female with laryngeal thrush    #) Laryngeal Candidiasis (patient treated previously with diflucan 200mg q24 for 10 days completed on 05/04/18)  - pain improving  - ENT following: pt scoped at bedside, net improvement in pharyngolaryngeal thrush  - continue nystatin swish & swallow TID x1 week   - mechanical soft diet  - pain control  - ID consult appreciated: continue Micafungin 100 mg iv q24h  - GI consulted appreciated: plan for egd   - f/u HIV test    #) Hypertension  - continue metoprolol and hydralazine  - monitor blood pressure    #) Throat cancer  - s/p excision in Nov 2017  - follows Dr. Sultana  - last chemo Jan 2018  - outpt follow up with hem/Onc    #) H/o DVT  - continue Xarelto    #) Activity  - ambulate as tolerated    #) Diet  - regular    #) Full code status

## 2018-05-10 NOTE — PROGRESS NOTE ADULT - ASSESSMENT
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    RECOMMENDATIONS:  Micafungin 100 mg iv q24h  Continue with oral Nystatin  GI for possible biopsy with fungal cultures for sensitivities.  HIV test.  Would not expect a dramatic improvement immediately

## 2018-05-10 NOTE — PROGRESS NOTE ADULT - SUBJECTIVE AND OBJECTIVE BOX
ILEANA MCNEIL  47y, Female      OVERNIGHT EVENTS:    Still symptomatic. Not better or worse.    VITALS:  T(F): 97.4, Max: 98.2 (05-09-18 @ 21:30)  HR: 68  BP: 97/55  RR: 18Vital Signs Last 24 Hrs  T(C): 36.3 (10 May 2018 05:15), Max: 36.8 (09 May 2018 21:30)  T(F): 97.4 (10 May 2018 05:15), Max: 98.2 (09 May 2018 21:30)  HR: 68 (10 May 2018 05:15) (66 - 83)  BP: 97/55 (10 May 2018 05:15) (97/55 - 136/62)  BP(mean): --  RR: 18 (10 May 2018 05:15) (15 - 18)  SpO2: 98% (09 May 2018 20:26) (98% - 98%)    TESTS & MEASUREMENTS:                        13.4   10.39 )-----------( 188      ( 10 May 2018 08:39 )             41.8     05-09    140  |  100  |  11  ----------------------------<  91  4.3   |  29  |  0.9    Ca    9.0      09 May 2018 07:10                RADIOLOGY & ADDITIONAL TESTS:    ANTIBIOTICS:  micafungin IVPB 100 milliGRAM(s) IV Intermittent every 24 hours  micafungin IVPB      nystatin    Suspension [id number] Unit(s) Swish and Swallow three times a day

## 2018-05-10 NOTE — CONSULT NOTE ADULT - SUBJECTIVE AND OBJECTIVE BOX
Chief Complaint: Patient is a 47y old  Female who presents with a chief complaint of Fatigue and generalized weakness (08 May 2018 05:46)      HPI:  46 yo female with significant PMH of CHF s/p AICD, DVT on xarelto, COPD, RA, laryngeal cancer s/p excision in november, s/p  2 week history of esophageal thrush 2 weeks ago status post PO diflucan treatment, presented for worsening fatigue and generalized weakness. She also endorses recent chest pain when eating and taking a deep breath. Shealso noticed 20 lb weight gain in the past two months. She denies chest pain on exertion or shortness of breath. She was seen in ED by ENT and bedside visualisation of the pharynx showed resolving infection, though patient still has hoarsness of her voice. (08 May 2018 05:46)      Medications:  ALBUTerol/ipratropium for Nebulization 3 milliLiter(s) Nebulizer every 6 hours  ALPRAZolam 1 milliGRAM(s) Oral three times a day PRN  docusate sodium 100 milliGRAM(s) Oral three times a day  DULoxetine 60 milliGRAM(s) Oral daily  FIRST- Mouthwash  BLM 5 milliLiter(s) Swish and Swallow every 6 hours  gabapentin 300 milliGRAM(s) Oral two times a day  hydrALAZINE 25 milliGRAM(s) Oral every 12 hours  HYDROmorphone   Tablet 8 milliGRAM(s) Oral every 4 hours PRN  metoprolol succinate  milliGRAM(s) Oral daily  micafungin IVPB 100 milliGRAM(s) IV Intermittent every 24 hours  micafungin IVPB      morphine ER Tablet 200 milliGRAM(s) Oral three times a day  nystatin    Suspension [id number] Unit(s) Swish and Swallow three times a day  rivaroxaban 20 milliGRAM(s) Oral at bedtime  senna 2 Tablet(s) Oral at bedtime      PMHX/PSHX:  DVT (deep venous thrombosis)  COPD (chronic obstructive pulmonary disease)  CHF (congestive heart failure)  HTN (hypertension)  RA (rheumatoid arthritis)  Throat cancer  AICD (automatic cardioverter/defibrillator) present  Lupus  History of laryngeal cancer  S/P cholecystectomy  S/P appendectomy      Family history:      Social History:     Allergies:  Avelox (Other)  Plaquenil (Other)  Vantin (Other (Mild))        Review of Systems:  General:  No wt loss, fevers, chills, night sweats, fatigue or pruritis.  Eyes:  Good vision, no reported pain or redness.  ENT:   sore throat, pain, and difficulty swallowing  CV:  No pain, palpitations, hypo/hypertension  Resp:  No dyspnea, cough, tachypnea, wheezing  GI:   dysphagia,  :  No pain, bleeding/discharges, incontinence, nocturia  Musculoskeletal:  No pain, weakness or fasciculations.  Neuro:  No weakness, tingling, memory problems or paresthesias  Psych:  No fatigue, insomnia, mood problems, depression  Endocrine:  No polyuria, polydipsia, cold/heat intolerance  Heme:  No petechiae, ecchymosis, easy bruisability  Skin:  No rash, pruritis, tattoos, scars, or edema      PHYSICAL EXAM:   Vital Signs:  Vital Signs Last 24 Hrs  T(C): 36.3 (10 May 2018 05:15), Max: 36.8 (09 May 2018 21:30)  T(F): 97.4 (10 May 2018 05:15), Max: 98.2 (09 May 2018 21:30)  HR: 68 (10 May 2018 05:15) (66 - 83)  BP: 97/55 (10 May 2018 05:15) (97/55 - 136/62)  BP(mean): --  RR: 18 (10 May 2018 05:15) (15 - 18)  SpO2: 98% (09 May 2018 20:26) (98% - 98%)  Daily     Daily Weight in k.5 (10 May 2018 05:15)    T(C): 36.3 (05-10-18 @ 05:15), Max: 36.8 (18 @ 21:30)  HR: 68 (05-10-18 @ 05:15) (66 - 83)  BP: 97/55 (05-10-18 @ 05:15) (97/55 - 136/62)  RR: 18 (05-10-18 @ 05:15) (15 - 18)  SpO2: 98% (18 @ 20:26) (98% - 98%)    GENERAL:  Appears stated age, well-groomed, well-nourished, no distress  HEENT:  Conjunctivae clear and pink, no thyromegaly, nodules, adenopathy, no JVD, sclera -anicteric  CHEST:  Full & symmetric excursion, no increased effort, breath sounds clear  HEART:  Regular rhythm, S1, S2, no murmur/rub/S3/S4, no abdominal bruit, no edema  ABDOMEN:  Soft, non-tender, non-distended, normoactive bowel sounds,  no masses ,no hepato-splenomegaly, no signs of chronic liver disease  EXTEREMITIES:  no cyanosis,clubbing or edema  SKIN:  No rash/erythema/ecchymoses/petechiae/wounds/abscess/warm/dry  NEURO:  Alert, oriented, no asterixis, no tremor, no encephalopathy    LABS:                        13.4   10.39 )-----------( 188      ( 10 May 2018 08:39 )             41.8     05-10    143  |  101  |  13  ----------------------------<  93  4.6   |  29  |  0.9    Ca    9.4      10 May 2018 08:39                Imaging:      Assessment and Plan:

## 2018-05-11 LAB
ANION GAP SERPL CALC-SCNC: 13 MMOL/L — SIGNIFICANT CHANGE UP (ref 7–14)
APTT BLD: 51.7 SEC — HIGH (ref 27–39.2)
BASOPHILS # BLD AUTO: 0.04 K/UL — SIGNIFICANT CHANGE UP (ref 0–0.2)
BASOPHILS NFR BLD AUTO: 0.4 % — SIGNIFICANT CHANGE UP (ref 0–1)
BUN SERPL-MCNC: 12 MG/DL — SIGNIFICANT CHANGE UP (ref 10–20)
CALCIUM SERPL-MCNC: 9.7 MG/DL — SIGNIFICANT CHANGE UP (ref 8.5–10.1)
CHLORIDE SERPL-SCNC: 97 MMOL/L — LOW (ref 98–110)
CO2 SERPL-SCNC: 32 MMOL/L — SIGNIFICANT CHANGE UP (ref 17–32)
CREAT SERPL-MCNC: 0.9 MG/DL — SIGNIFICANT CHANGE UP (ref 0.7–1.5)
EOSINOPHIL # BLD AUTO: 0.37 K/UL — SIGNIFICANT CHANGE UP (ref 0–0.7)
EOSINOPHIL NFR BLD AUTO: 4 % — SIGNIFICANT CHANGE UP (ref 0–8)
GLUCOSE SERPL-MCNC: 109 MG/DL — HIGH (ref 70–99)
HCT VFR BLD CALC: 40.6 % — SIGNIFICANT CHANGE UP (ref 37–47)
HGB BLD-MCNC: 13 G/DL — SIGNIFICANT CHANGE UP (ref 12–16)
IMM GRANULOCYTES NFR BLD AUTO: 0.4 % — HIGH (ref 0.1–0.3)
INR BLD: 1 RATIO — SIGNIFICANT CHANGE UP (ref 0.65–1.3)
LYMPHOCYTES # BLD AUTO: 2.78 K/UL — SIGNIFICANT CHANGE UP (ref 1.2–3.4)
LYMPHOCYTES # BLD AUTO: 30.3 % — SIGNIFICANT CHANGE UP (ref 20.5–51.1)
MCHC RBC-ENTMCNC: 27.7 PG — SIGNIFICANT CHANGE UP (ref 27–31)
MCHC RBC-ENTMCNC: 32 G/DL — SIGNIFICANT CHANGE UP (ref 32–37)
MCV RBC AUTO: 86.6 FL — SIGNIFICANT CHANGE UP (ref 81–99)
MONOCYTES # BLD AUTO: 0.97 K/UL — HIGH (ref 0.1–0.6)
MONOCYTES NFR BLD AUTO: 10.6 % — HIGH (ref 1.7–9.3)
NEUTROPHILS # BLD AUTO: 4.98 K/UL — SIGNIFICANT CHANGE UP (ref 1.4–6.5)
NEUTROPHILS NFR BLD AUTO: 54.3 % — SIGNIFICANT CHANGE UP (ref 42.2–75.2)
NRBC # BLD: 0 /100 WBCS — SIGNIFICANT CHANGE UP (ref 0–0)
PLATELET # BLD AUTO: 172 K/UL — SIGNIFICANT CHANGE UP (ref 130–400)
POTASSIUM SERPL-MCNC: 4.4 MMOL/L — SIGNIFICANT CHANGE UP (ref 3.5–5)
POTASSIUM SERPL-SCNC: 4.4 MMOL/L — SIGNIFICANT CHANGE UP (ref 3.5–5)
PROTHROM AB SERPL-ACNC: 10.8 SEC — SIGNIFICANT CHANGE UP (ref 9.95–12.87)
RBC # BLD: 4.69 M/UL — SIGNIFICANT CHANGE UP (ref 4.2–5.4)
RBC # FLD: 13.5 % — SIGNIFICANT CHANGE UP (ref 11.5–14.5)
SODIUM SERPL-SCNC: 142 MMOL/L — SIGNIFICANT CHANGE UP (ref 135–146)
WBC # BLD: 9.18 K/UL — SIGNIFICANT CHANGE UP (ref 4.8–10.8)
WBC # FLD AUTO: 9.18 K/UL — SIGNIFICANT CHANGE UP (ref 4.8–10.8)

## 2018-05-11 RX ORDER — DIPHENHYDRAMINE HCL 50 MG
10 CAPSULE ORAL ONCE
Qty: 0 | Refills: 0 | Status: COMPLETED | OUTPATIENT
Start: 2018-05-11 | End: 2018-05-11

## 2018-05-11 RX ORDER — HEPARIN SODIUM 5000 [USP'U]/ML
5000 INJECTION INTRAVENOUS; SUBCUTANEOUS EVERY 8 HOURS
Qty: 0 | Refills: 0 | Status: DISCONTINUED | OUTPATIENT
Start: 2018-05-11 | End: 2018-05-14

## 2018-05-11 RX ADMIN — MORPHINE SULFATE 200 MILLIGRAM(S): 50 CAPSULE, EXTENDED RELEASE ORAL at 13:59

## 2018-05-11 RX ADMIN — HEPARIN SODIUM 5000 UNIT(S): 5000 INJECTION INTRAVENOUS; SUBCUTANEOUS at 13:59

## 2018-05-11 RX ADMIN — Medication 500000 UNIT(S): at 13:53

## 2018-05-11 RX ADMIN — Medication 100 MILLIGRAM(S): at 06:26

## 2018-05-11 RX ADMIN — Medication 25 MILLIGRAM(S): at 06:25

## 2018-05-11 RX ADMIN — HEPARIN SODIUM 5000 UNIT(S): 5000 INJECTION INTRAVENOUS; SUBCUTANEOUS at 22:52

## 2018-05-11 RX ADMIN — HYDROMORPHONE HYDROCHLORIDE 8 MILLIGRAM(S): 2 INJECTION INTRAMUSCULAR; INTRAVENOUS; SUBCUTANEOUS at 17:08

## 2018-05-11 RX ADMIN — Medication 500000 UNIT(S): at 22:50

## 2018-05-11 RX ADMIN — MORPHINE SULFATE 200 MILLIGRAM(S): 50 CAPSULE, EXTENDED RELEASE ORAL at 23:20

## 2018-05-11 RX ADMIN — MORPHINE SULFATE 200 MILLIGRAM(S): 50 CAPSULE, EXTENDED RELEASE ORAL at 06:34

## 2018-05-11 RX ADMIN — DIPHENHYDRAMINE HYDROCHLORIDE AND LIDOCAINE HYDROCHLORIDE AND ALUMINUM HYDROXIDE AND MAGNESIUM HYDRO 5 MILLILITER(S): KIT at 11:29

## 2018-05-11 RX ADMIN — DULOXETINE HYDROCHLORIDE 60 MILLIGRAM(S): 30 CAPSULE, DELAYED RELEASE ORAL at 11:29

## 2018-05-11 RX ADMIN — DIPHENHYDRAMINE HYDROCHLORIDE AND LIDOCAINE HYDROCHLORIDE AND ALUMINUM HYDROXIDE AND MAGNESIUM HYDRO 5 MILLILITER(S): KIT at 17:02

## 2018-05-11 RX ADMIN — Medication 2 MILLIGRAM(S): at 22:49

## 2018-05-11 RX ADMIN — DIPHENHYDRAMINE HYDROCHLORIDE AND LIDOCAINE HYDROCHLORIDE AND ALUMINUM HYDROXIDE AND MAGNESIUM HYDRO 5 MILLILITER(S): KIT at 23:05

## 2018-05-11 RX ADMIN — Medication 3 MILLILITER(S): at 13:14

## 2018-05-11 RX ADMIN — Medication 500000 UNIT(S): at 06:25

## 2018-05-11 RX ADMIN — GABAPENTIN 300 MILLIGRAM(S): 400 CAPSULE ORAL at 06:25

## 2018-05-11 RX ADMIN — Medication 25 MILLIGRAM(S): at 17:02

## 2018-05-11 RX ADMIN — MICAFUNGIN SODIUM 105 MILLIGRAM(S): 100 INJECTION, POWDER, LYOPHILIZED, FOR SOLUTION INTRAVENOUS at 17:03

## 2018-05-11 RX ADMIN — GABAPENTIN 300 MILLIGRAM(S): 400 CAPSULE ORAL at 17:02

## 2018-05-11 RX ADMIN — DIPHENHYDRAMINE HYDROCHLORIDE AND LIDOCAINE HYDROCHLORIDE AND ALUMINUM HYDROXIDE AND MAGNESIUM HYDRO 5 MILLILITER(S): KIT at 00:38

## 2018-05-11 RX ADMIN — DIPHENHYDRAMINE HYDROCHLORIDE AND LIDOCAINE HYDROCHLORIDE AND ALUMINUM HYDROXIDE AND MAGNESIUM HYDRO 5 MILLILITER(S): KIT at 06:25

## 2018-05-11 RX ADMIN — Medication 2 MILLIGRAM(S): at 13:52

## 2018-05-11 RX ADMIN — MORPHINE SULFATE 200 MILLIGRAM(S): 50 CAPSULE, EXTENDED RELEASE ORAL at 22:49

## 2018-05-11 RX ADMIN — HYDROMORPHONE HYDROCHLORIDE 8 MILLIGRAM(S): 2 INJECTION INTRAMUSCULAR; INTRAVENOUS; SUBCUTANEOUS at 08:50

## 2018-05-11 RX ADMIN — Medication 2 MILLIGRAM(S): at 06:31

## 2018-05-11 NOTE — PROGRESS NOTE ADULT - ATTENDING COMMENTS
47y old Female  with significant PMH of CHF s/p AICD, DVT on xarelto, COPD, RA, laryngeal cancer s/p excision in november, s/p  2 week history of esophageal thrush 2 weeks ago status post PO diflucan treatment, presented for worsening fatigue and generalized weakness and odynophagia. Admitted for laryngeal thrush    Patient was unavailable for exam today. Discussed the case with housestaff and nurses.     #) Laryngeal Candidiasis (patient treated previously with diflucan 200mg q24 for 10 days completed on 05/04/18)  - ENT following: pt scoped at bedside, net improvement in pharyngolaryngeal thrush  - pain improving  - continue nystatin swish & swallow TID x1 week , Hurricaine q6H.   - mechanical soft diet  - pain control  - ID consult appreciated: start Micafungin 100 mg iv q24h  - f/u HIV test   - EGD today cancelled since patient has been on Xarelto. D/c Xarelto today. May do sq hep until sunday evening. Possible EGD on monday.     #) Hypertension  - continue metoprolol and hydralazine  - monitor blood pressure    #) Throat cancer  - s/p excision in Nov 2017  - follows Dr. Sultana  - last chemo Jan 2018  - outpt follow up with hem/Onc    #) H/o DVT  - Will hold Xarelto until EGD is done    #) Full code status .

## 2018-05-11 NOTE — PROGRESS NOTE ADULT - SUBJECTIVE AND OBJECTIVE BOX
SUBJECTIVE:    Patient is a 47y old Female who presents with a chief complaint of Fatigue and generalized weakness (08 May 2018 05:46)    Currently admitted to medicine with the primary diagnosis of Thrush     Today is hospital day 4d. This morning she is resting comfortably in bed and reports no new issues or overnight events.     PAST MEDICAL & SURGICAL HISTORY  DVT (deep venous thrombosis)  COPD (chronic obstructive pulmonary disease)  CHF (congestive heart failure)  HTN (hypertension)  RA (rheumatoid arthritis)  Throat cancer  AICD (automatic cardioverter/defibrillator) present  Lupus  History of laryngeal cancer  S/P cholecystectomy  S/P appendectomy    SOCIAL HISTORY:  Negative for smoking/alcohol/drug use.     ALLERGIES:  Avelox (Other)  Plaquenil (Other)  Vantin (Other (Mild))    MEDICATIONS:  STANDING MEDICATIONS  ALBUTerol/ipratropium for Nebulization 3 milliLiter(s) Nebulizer every 6 hours  ALPRAZolam 2 milliGRAM(s) Oral every 8 hours  docusate sodium 100 milliGRAM(s) Oral three times a day  DULoxetine 60 milliGRAM(s) Oral daily  FIRST- Mouthwash  BLM 5 milliLiter(s) Swish and Swallow every 6 hours  gabapentin 300 milliGRAM(s) Oral two times a day  hydrALAZINE 25 milliGRAM(s) Oral every 12 hours  metoprolol succinate  milliGRAM(s) Oral daily  micafungin IVPB 100 milliGRAM(s) IV Intermittent every 24 hours  micafungin IVPB      morphine ER Tablet 200 milliGRAM(s) Oral three times a day  nystatin    Suspension [id number] Unit(s) Swish and Swallow three times a day  senna 2 Tablet(s) Oral at bedtime    PRN MEDICATIONS  HYDROmorphone   Tablet 8 milliGRAM(s) Oral every 4 hours PRN    VITALS:   T(F): 97.8  HR: 75  BP: 115/56  RR: 18  SpO2: 96%    LABS:                        13.4   10.39 )-----------( 188      ( 10 May 2018 08:39 )             41.8     05-10    143  |  101  |  13  ----------------------------<  93  4.6   |  29  |  0.9    Ca    9.4      10 May 2018 08:39      RADIOLOGY:  Xray Chest 2 Views PA/Lat (05.07.18)  No radiographic evidence of acute cardiopulmonary disease    PHYSICAL EXAM:  GEN: No acute distress, hoarseness of voice   LUNGS: Clear to auscultation bilaterally   HEART: S1/S2 present. RRR.   ABD: Soft, non-tender, non-distended. Bowel sounds present  EXT: NC/NC/NE/2+PP/HUNTER  NEURO: AAOX3

## 2018-05-11 NOTE — PROGRESS NOTE ADULT - ASSESSMENT
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  HIV is negative.    RECOMMENDATIONS:  Micafungin 100 mg iv q24h  Continue with oral Nystatin  GI for possible biopsy with fungal cultures for sensitivities

## 2018-05-11 NOTE — PROGRESS NOTE ADULT - SUBJECTIVE AND OBJECTIVE BOX
EWAILEANA  47y, Female      OVERNIGHT EVENTS:    Sore throat along with odynophagia.    VITALS:  T(F): 97.8, Max: 98.8 (05-10-18 @ 12:24)  HR: 75  BP: 115/56  RR: 18Vital Signs Last 24 Hrs  T(C): 36.6 (11 May 2018 05:00), Max: 37.1 (10 May 2018 12:24)  T(F): 97.8 (11 May 2018 05:00), Max: 98.8 (10 May 2018 12:24)  HR: 75 (11 May 2018 05:00) (75 - 97)  BP: 115/56 (11 May 2018 05:00) (115/56 - 145/75)  BP(mean): --  RR: 18 (11 May 2018 05:00) (15 - 18)  SpO2: 96% (11 May 2018 04:47) (96% - 96%)    TESTS & MEASUREMENTS:                        13.0   9.18  )-----------( 172      ( 11 May 2018 09:42 )             40.6     05-10    143  |  101  |  13  ----------------------------<  93  4.6   |  29  |  0.9    Ca    9.4      10 May 2018 08:39                RADIOLOGY & ADDITIONAL TESTS:    ANTIBIOTICS:  micafungin IVPB 100 milliGRAM(s) IV Intermittent every 24 hours  micafungin IVPB      nystatin    Suspension [id number] Unit(s) Swish and Swallow three times a day

## 2018-05-11 NOTE — PROGRESS NOTE ADULT - ASSESSMENT
47y old Female with laryngeal thrush    #) Laryngeal Candidiasis (patient treated previously with diflucan 200mg q24 for 10 days completed on 05/04/18)  - pain improving  - ENT following: pt scoped at bedside, net improvement in pharyngolaryngeal thrush  - continue nystatin swish & swallow TID x1 week   - mechanical soft diet  - pain control  - ID consult appreciated: continue Micafungin 100 mg iv q24h  - GI consulted appreciated: plan for egd on monday  - HIV test negative    #) Hypertension  - continue metoprolol and hydralazine  - monitor blood pressure    #) Throat cancer s/p excision in Nov 2017  - follows Dr. Sultana  - last chemo Jan 2018  - outpt follow up with hem/Onc    #) H/o DVT  - continue Xarelto    #) Activity  - ambulate as tolerated    #) Disposition  - to home once patient is stable    #) Full code status 47y old Female with laryngeal thrush    #) Laryngeal Candidiasis (patient treated previously with diflucan 200mg q24 for 10 days completed on 05/04/18)  - pain improving  - ENT following: pt scoped at bedside, net improvement in pharyngolaryngeal thrush  - continue nystatin swish & swallow TID x1 week   - mechanical soft diet  - pain control  - ID consult appreciated: continue Micafungin 100 mg iv q24h  - GI consulted appreciated: plan for egd on monday - as per patient, she self administrated Xarelto yesterday and took breakfast today morning - so egd was cancelled today  - HIV test negative    #) Hypertension  - continue metoprolol and hydralazine  - monitor blood pressure    #) Throat cancer s/p excision in Nov 2017  - follows Dr. Sultana  - last chemo Jan 2018  - outpt follow up with hem/Onc    #) H/o DVT  - continue Xarelto    #) Activity  - ambulate as tolerated    #) Disposition  - to home once patient is stable    #) Full code status

## 2018-05-12 PROCEDURE — 93970 EXTREMITY STUDY: CPT | Mod: 26

## 2018-05-12 RX ORDER — DIPHENHYDRAMINE HCL 50 MG
10 CAPSULE ORAL AT BEDTIME
Qty: 0 | Refills: 0 | Status: DISCONTINUED | OUTPATIENT
Start: 2018-05-12 | End: 2018-05-13

## 2018-05-12 RX ORDER — ONDANSETRON 8 MG/1
4 TABLET, FILM COATED ORAL ONCE
Qty: 0 | Refills: 0 | Status: COMPLETED | OUTPATIENT
Start: 2018-05-12 | End: 2018-05-12

## 2018-05-12 RX ORDER — ONDANSETRON 8 MG/1
8 TABLET, FILM COATED ORAL EVERY 8 HOURS
Qty: 0 | Refills: 0 | Status: DISCONTINUED | OUTPATIENT
Start: 2018-05-12 | End: 2018-05-18

## 2018-05-12 RX ADMIN — HEPARIN SODIUM 5000 UNIT(S): 5000 INJECTION INTRAVENOUS; SUBCUTANEOUS at 21:42

## 2018-05-12 RX ADMIN — MORPHINE SULFATE 200 MILLIGRAM(S): 50 CAPSULE, EXTENDED RELEASE ORAL at 15:35

## 2018-05-12 RX ADMIN — DIPHENHYDRAMINE HYDROCHLORIDE AND LIDOCAINE HYDROCHLORIDE AND ALUMINUM HYDROXIDE AND MAGNESIUM HYDRO 5 MILLILITER(S): KIT at 17:12

## 2018-05-12 RX ADMIN — Medication 500000 UNIT(S): at 21:37

## 2018-05-12 RX ADMIN — HEPARIN SODIUM 5000 UNIT(S): 5000 INJECTION INTRAVENOUS; SUBCUTANEOUS at 13:03

## 2018-05-12 RX ADMIN — MORPHINE SULFATE 200 MILLIGRAM(S): 50 CAPSULE, EXTENDED RELEASE ORAL at 06:50

## 2018-05-12 RX ADMIN — Medication 100 MILLIGRAM(S): at 21:35

## 2018-05-12 RX ADMIN — MORPHINE SULFATE 200 MILLIGRAM(S): 50 CAPSULE, EXTENDED RELEASE ORAL at 13:02

## 2018-05-12 RX ADMIN — MICAFUNGIN SODIUM 105 MILLIGRAM(S): 100 INJECTION, POWDER, LYOPHILIZED, FOR SOLUTION INTRAVENOUS at 17:11

## 2018-05-12 RX ADMIN — Medication 25 MILLIGRAM(S): at 06:14

## 2018-05-12 RX ADMIN — DULOXETINE HYDROCHLORIDE 60 MILLIGRAM(S): 30 CAPSULE, DELAYED RELEASE ORAL at 11:07

## 2018-05-12 RX ADMIN — DIPHENHYDRAMINE HYDROCHLORIDE AND LIDOCAINE HYDROCHLORIDE AND ALUMINUM HYDROXIDE AND MAGNESIUM HYDRO 5 MILLILITER(S): KIT at 06:16

## 2018-05-12 RX ADMIN — Medication 500000 UNIT(S): at 13:03

## 2018-05-12 RX ADMIN — HEPARIN SODIUM 5000 UNIT(S): 5000 INJECTION INTRAVENOUS; SUBCUTANEOUS at 06:15

## 2018-05-12 RX ADMIN — GABAPENTIN 300 MILLIGRAM(S): 400 CAPSULE ORAL at 17:15

## 2018-05-12 RX ADMIN — MORPHINE SULFATE 200 MILLIGRAM(S): 50 CAPSULE, EXTENDED RELEASE ORAL at 21:40

## 2018-05-12 RX ADMIN — MORPHINE SULFATE 200 MILLIGRAM(S): 50 CAPSULE, EXTENDED RELEASE ORAL at 06:22

## 2018-05-12 RX ADMIN — GABAPENTIN 300 MILLIGRAM(S): 400 CAPSULE ORAL at 06:14

## 2018-05-12 RX ADMIN — Medication 500000 UNIT(S): at 06:14

## 2018-05-12 RX ADMIN — Medication 2 MILLIGRAM(S): at 06:23

## 2018-05-12 RX ADMIN — DIPHENHYDRAMINE HYDROCHLORIDE AND LIDOCAINE HYDROCHLORIDE AND ALUMINUM HYDROXIDE AND MAGNESIUM HYDRO 5 MILLILITER(S): KIT at 11:07

## 2018-05-12 RX ADMIN — Medication 100 MILLIGRAM(S): at 06:14

## 2018-05-12 RX ADMIN — Medication 2 MILLIGRAM(S): at 13:02

## 2018-05-12 RX ADMIN — HYDROMORPHONE HYDROCHLORIDE 8 MILLIGRAM(S): 2 INJECTION INTRAMUSCULAR; INTRAVENOUS; SUBCUTANEOUS at 11:07

## 2018-05-12 RX ADMIN — Medication 25 MILLIGRAM(S): at 17:15

## 2018-05-12 RX ADMIN — Medication 2 MILLIGRAM(S): at 21:35

## 2018-05-12 RX ADMIN — SENNA PLUS 2 TABLET(S): 8.6 TABLET ORAL at 21:35

## 2018-05-12 NOTE — PROGRESS NOTE ADULT - SUBJECTIVE AND OBJECTIVE BOX
SUBJECTIVE:    Patient is a 47y old Female who presents with a chief complaint of Fatigue and generalized weakness (08 May 2018 05:46)    Currently admitted to medicine with the primary diagnosis of Thrush     Today is hospital day 5d. This morning she is resting comfortably in bed and reports no new issues or overnight events.     PAST MEDICAL & SURGICAL HISTORY  DVT (deep venous thrombosis)  COPD (chronic obstructive pulmonary disease)  CHF (congestive heart failure)  HTN (hypertension)  RA (rheumatoid arthritis)  Throat cancer  AICD (automatic cardioverter/defibrillator) present  Lupus  History of laryngeal cancer  S/P cholecystectomy  S/P appendectomy    SOCIAL HISTORY:  Negative for smoking/alcohol/drug use.     ALLERGIES:  Avelox (Other)  Plaquenil (Other)  Vantin (Other (Mild))    MEDICATIONS:  STANDING MEDICATIONS  ALBUTerol/ipratropium for Nebulization 3 milliLiter(s) Nebulizer every 6 hours  ALPRAZolam 2 milliGRAM(s) Oral every 8 hours  diphenhydrAMINE   Injectable 10 milliGRAM(s) IV Push once  docusate sodium 100 milliGRAM(s) Oral three times a day  DULoxetine 60 milliGRAM(s) Oral daily  FIRST- Mouthwash  BLM 5 milliLiter(s) Swish and Swallow every 6 hours  gabapentin 300 milliGRAM(s) Oral two times a day  heparin  Injectable 5000 Unit(s) SubCutaneous every 8 hours  hydrALAZINE 25 milliGRAM(s) Oral every 12 hours  metoprolol succinate  milliGRAM(s) Oral daily  micafungin IVPB 100 milliGRAM(s) IV Intermittent every 24 hours  micafungin IVPB      morphine ER Tablet 200 milliGRAM(s) Oral three times a day  nystatin    Suspension [id number] Unit(s) Swish and Swallow three times a day  senna 2 Tablet(s) Oral at bedtime    PRN MEDICATIONS  HYDROmorphone   Tablet 8 milliGRAM(s) Oral every 4 hours PRN    VITALS:   T(F): 97  HR: 65  BP: 111/63  RR: 17  SpO2: --    LABS:                        13.0   9.18  )-----------( 172      ( 11 May 2018 09:42 )             40.6     05-11    142  |  97<L>  |  12  ----------------------------<  109<H>  4.4   |  32  |  0.9    Ca    9.7      11 May 2018 09:42      PT/INR - ( 11 May 2018 09:42 )   PT: 10.80 sec;   INR: 1.00 ratio         PTT - ( 11 May 2018 09:42 )  PTT:51.7 sec      RADIOLOGY:  Xray Chest 2 Views PA/Lat (05.07.18)  No radiographic evidence of acute cardiopulmonary disease    PHYSICAL EXAM:  GEN: No acute distress, hoarseness of voice   LUNGS: Clear to auscultation bilaterally   HEART: S1/S2 present. RRR.   ABD: Soft, non-tender, non-distended. Bowel sounds present  EXT: NC/NC/NE/2+PP/HUNTER  NEURO: AAOX3

## 2018-05-12 NOTE — PROGRESS NOTE ADULT - ASSESSMENT
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  HIV is negative.    RECOMMENDATIONS:  Micafungin 100 mg iv q24h  Continue with oral Nystatin.  Bx on monday.

## 2018-05-12 NOTE — PROGRESS NOTE ADULT - ATTENDING COMMENTS
47y old Female  with significant PMH of CHF s/p AICD, DVT on xarelto, COPD, RA, laryngeal cancer s/p excision in november, s/p  2 week history of esophageal thrush 2 weeks ago status post PO diflucan treatment, presented for worsening fatigue and generalized weakness and odynophagia. Admitted for laryngeal thrush    S : Pain only slightly better. Eating.   No CP/N/V.     A/P :     #) Laryngeal Candidiasis (patient treated previously with diflucan 200mg q24 for 10 days completed on 05/04/18)  - ENT following: pt scoped at bedside, net improvement in pharyngolaryngeal thrush  - pain improving  - continue nystatin swish & swallow TID x1 week , Hurricaine q6H.   - mechanical soft diet  - pain control  - ID consult appreciated: start Micafungin 100 mg iv q24h  - f/u HIV test   - EGD friday cancelled since patient has been on Xarelto. D/kalyan Xarelto friday. May do sq hep until sunday evening. Possible EGD on monday. Check again with GI.   -May try some more benadryl for her pains (d/t inflammation). Helped her yest.     #) Hypertension  - continue metoprolol and hydralazine  - monitor blood pressure    #) Throat cancer  - s/p excision in Nov 2017  - follows Dr. Sultana  - last chemo Jan 2018  - outpt follow up with hem/Onc    #) H/o DVT  - Will hold Xarelto until EGD is done    #) Full code status .

## 2018-05-12 NOTE — PROGRESS NOTE ADULT - SUBJECTIVE AND OBJECTIVE BOX
EWA ILEANA  47y, Female      OVERNIGHT EVENTS:    none    VITALS:  T(F): 97, Max: 97.4 (05-11-18 @ 12:20)  HR: 65  BP: 111/63  RR: 17Vital Signs Last 24 Hrs  T(C): 36.1 (12 May 2018 06:14), Max: 36.3 (11 May 2018 12:20)  T(F): 97 (12 May 2018 06:14), Max: 97.4 (11 May 2018 12:20)  HR: 65 (12 May 2018 06:14) (62 - 65)  BP: 111/63 (12 May 2018 06:14) (103/56 - 115/68)  BP(mean): --  RR: 17 (12 May 2018 06:14) (17 - 18)  SpO2: --    TESTS & MEASUREMENTS:                        13.0   9.18  )-----------( 172      ( 11 May 2018 09:42 )             40.6     05-11    142  |  97<L>  |  12  ----------------------------<  109<H>  4.4   |  32  |  0.9    Ca    9.7      11 May 2018 09:42                RADIOLOGY & ADDITIONAL TESTS:    ANTIBIOTICS:  micafungin IVPB 100 milliGRAM(s) IV Intermittent every 24 hours  micafungin IVPB      nystatin    Suspension [id number] Unit(s) Swish and Swallow three times a day

## 2018-05-12 NOTE — PROGRESS NOTE ADULT - ASSESSMENT
47y old Female with laryngeal thrush    #) Laryngeal Candidiasis (patient treated previously with diflucan 200mg q24 for 10 days completed on 05/04/18)  - pain improving  - ENT following: pt scoped at bedside, net improvement in pharyngolaryngeal thrush  - continue nystatin swish & swallow TID x1 week   - mechanical soft diet  - pain control  - ID consult appreciated: continue Micafungin 100 mg iv q24h  - GI consulted appreciated: plan for egd on monday   - HIV test negative    #) Hypertension  - continue metoprolol and hydralazine  - monitor blood pressure    #) Throat cancer s/p excision in Nov 2017  - follows Dr. Sultana  - last chemo Jan 2018  - outpt follow up with hem/Onc    #) H/o DVT  - continue Xarelto    #) Activity  - ambulate as tolerated    #) Disposition  - to home once patient is stable    #) Full code status

## 2018-05-13 RX ADMIN — Medication 2 MILLIGRAM(S): at 05:45

## 2018-05-13 RX ADMIN — HYDROMORPHONE HYDROCHLORIDE 8 MILLIGRAM(S): 2 INJECTION INTRAMUSCULAR; INTRAVENOUS; SUBCUTANEOUS at 18:49

## 2018-05-13 RX ADMIN — Medication 100 MILLIGRAM(S): at 22:12

## 2018-05-13 RX ADMIN — Medication 500000 UNIT(S): at 05:46

## 2018-05-13 RX ADMIN — HEPARIN SODIUM 5000 UNIT(S): 5000 INJECTION INTRAVENOUS; SUBCUTANEOUS at 14:09

## 2018-05-13 RX ADMIN — MORPHINE SULFATE 200 MILLIGRAM(S): 50 CAPSULE, EXTENDED RELEASE ORAL at 06:51

## 2018-05-13 RX ADMIN — MORPHINE SULFATE 200 MILLIGRAM(S): 50 CAPSULE, EXTENDED RELEASE ORAL at 22:17

## 2018-05-13 RX ADMIN — Medication 2 MILLIGRAM(S): at 14:06

## 2018-05-13 RX ADMIN — HEPARIN SODIUM 5000 UNIT(S): 5000 INJECTION INTRAVENOUS; SUBCUTANEOUS at 05:48

## 2018-05-13 RX ADMIN — MORPHINE SULFATE 200 MILLIGRAM(S): 50 CAPSULE, EXTENDED RELEASE ORAL at 05:45

## 2018-05-13 RX ADMIN — MORPHINE SULFATE 200 MILLIGRAM(S): 50 CAPSULE, EXTENDED RELEASE ORAL at 14:22

## 2018-05-13 RX ADMIN — GABAPENTIN 300 MILLIGRAM(S): 400 CAPSULE ORAL at 05:47

## 2018-05-13 RX ADMIN — Medication 25 MILLIGRAM(S): at 05:45

## 2018-05-13 RX ADMIN — DIPHENHYDRAMINE HYDROCHLORIDE AND LIDOCAINE HYDROCHLORIDE AND ALUMINUM HYDROXIDE AND MAGNESIUM HYDRO 5 MILLILITER(S): KIT at 01:05

## 2018-05-13 RX ADMIN — DIPHENHYDRAMINE HYDROCHLORIDE AND LIDOCAINE HYDROCHLORIDE AND ALUMINUM HYDROXIDE AND MAGNESIUM HYDRO 5 MILLILITER(S): KIT at 11:10

## 2018-05-13 RX ADMIN — GABAPENTIN 300 MILLIGRAM(S): 400 CAPSULE ORAL at 17:14

## 2018-05-13 RX ADMIN — DULOXETINE HYDROCHLORIDE 60 MILLIGRAM(S): 30 CAPSULE, DELAYED RELEASE ORAL at 11:10

## 2018-05-13 RX ADMIN — Medication 100 MILLIGRAM(S): at 05:45

## 2018-05-13 RX ADMIN — DIPHENHYDRAMINE HYDROCHLORIDE AND LIDOCAINE HYDROCHLORIDE AND ALUMINUM HYDROXIDE AND MAGNESIUM HYDRO 5 MILLILITER(S): KIT at 17:13

## 2018-05-13 RX ADMIN — HYDROMORPHONE HYDROCHLORIDE 8 MILLIGRAM(S): 2 INJECTION INTRAMUSCULAR; INTRAVENOUS; SUBCUTANEOUS at 12:19

## 2018-05-13 RX ADMIN — HEPARIN SODIUM 5000 UNIT(S): 5000 INJECTION INTRAVENOUS; SUBCUTANEOUS at 22:12

## 2018-05-13 RX ADMIN — Medication 500000 UNIT(S): at 22:14

## 2018-05-13 RX ADMIN — DIPHENHYDRAMINE HYDROCHLORIDE AND LIDOCAINE HYDROCHLORIDE AND ALUMINUM HYDROXIDE AND MAGNESIUM HYDRO 5 MILLILITER(S): KIT at 23:31

## 2018-05-13 RX ADMIN — ONDANSETRON 4 MILLIGRAM(S): 8 TABLET, FILM COATED ORAL at 00:06

## 2018-05-13 RX ADMIN — Medication 2 MILLIGRAM(S): at 22:11

## 2018-05-13 RX ADMIN — Medication 25 MILLIGRAM(S): at 17:14

## 2018-05-13 RX ADMIN — Medication 500000 UNIT(S): at 14:06

## 2018-05-13 RX ADMIN — ONDANSETRON 8 MILLIGRAM(S): 8 TABLET, FILM COATED ORAL at 05:53

## 2018-05-13 RX ADMIN — DIPHENHYDRAMINE HYDROCHLORIDE AND LIDOCAINE HYDROCHLORIDE AND ALUMINUM HYDROXIDE AND MAGNESIUM HYDRO 5 MILLILITER(S): KIT at 05:46

## 2018-05-13 RX ADMIN — HYDROMORPHONE HYDROCHLORIDE 8 MILLIGRAM(S): 2 INJECTION INTRAMUSCULAR; INTRAVENOUS; SUBCUTANEOUS at 10:40

## 2018-05-13 RX ADMIN — MICAFUNGIN SODIUM 105 MILLIGRAM(S): 100 INJECTION, POWDER, LYOPHILIZED, FOR SOLUTION INTRAVENOUS at 14:46

## 2018-05-13 NOTE — PROGRESS NOTE ADULT - SUBJECTIVE AND OBJECTIVE BOX
EWA ILEANA  47y, Female      OVERNIGHT EVENTS:    still symptomatic    VITALS:  T(F): 96.5, Max: 97.6 (05-12-18 @ 20:31)  HR: 67  BP: 124/56  RR: 20Vital Signs Last 24 Hrs  T(C): 35.8 (13 May 2018 05:15), Max: 36.4 (12 May 2018 20:31)  T(F): 96.5 (13 May 2018 05:15), Max: 97.6 (12 May 2018 20:31)  HR: 67 (13 May 2018 05:15) (63 - 72)  BP: 124/56 (13 May 2018 05:15) (108/62 - 129/59)  BP(mean): --  RR: 20 (13 May 2018 05:15) (20 - 20)  SpO2: --    TESTS & MEASUREMENTS:                        13.0   9.18  )-----------( 172      ( 11 May 2018 09:42 )             40.6     05-11    142  |  97<L>  |  12  ----------------------------<  109<H>  4.4   |  32  |  0.9    Ca    9.7      11 May 2018 09:42                RADIOLOGY & ADDITIONAL TESTS:    ANTIBIOTICS:  micafungin IVPB 100 milliGRAM(s) IV Intermittent every 24 hours  micafungin IVPB      nystatin    Suspension [id number] Unit(s) Swish and Swallow three times a day

## 2018-05-13 NOTE — PROGRESS NOTE ADULT - ASSESSMENT
47y old Female  with significant PMH of CHF s/p AICD, DVT on xarelto, COPD, RA, laryngeal cancer s/p excision in november, s/p  2 week history of esophageal thrush 2 weeks ago status post PO diflucan treatment, presented for worsening fatigue and generalized weakness and odynophagia. Admitted for laryngeal thrush    S : Pain only slightly better. Eating.   No CP/N/V.     A/P :     #) Laryngeal Candidiasis (patient treated previously with diflucan 200mg q24 for 10 days completed on 05/04/18)  - ENT following: pt scoped at bedside, net improvement in pharyngolaryngeal thrush  - pain improving  - continue nystatin swish & swallow TID x1 week , Hurricaine q6H.   - mechanical soft diet  - pain control  Micafungin 100 mg iv q24h   HIV test neg.  - EGD friday cancelled since patient has been on Xarelto. D/kalyan Xarelto friday. May do sq hep until sunday evening. Possible EGD on monday. Check again with GI early in AM. NPO after midnight.   -May try some more benadryl for her pains (d/t inflammation). Helped her yest.    #) Hypertension  - continue metoprolol and hydralazine  - monitor blood pressure    #) Throat cancer  - s/p excision in Nov 2017  - follows Dr. Sultana  - last chemo Jan 2018  - outpt follow up with hem/Onc    #) H/o DVT  - Will hold Xarelto until EGD is done    #) Full code status .

## 2018-05-13 NOTE — PROGRESS NOTE ADULT - SUBJECTIVE AND OBJECTIVE BOX
S : Pain only slightly better. Eating.   No CP/N/V.       All other pertinent ROS negative.      Vital Signs Last 24 Hrs  T(C): 36.9 (13 May 2018 13:40), Max: 36.9 (13 May 2018 13:40)  T(F): 98.4 (13 May 2018 13:40), Max: 98.4 (13 May 2018 13:40)  HR: 64 (13 May 2018 13:40) (64 - 72)  BP: 115/54 (13 May 2018 13:40) (115/54 - 129/59)  BP(mean): --  RR: 20 (13 May 2018 13:40) (20 - 20)  SpO2: --  PHYSICAL EXAM:    Constitutional: NAD, awake and alert, well-developed  HEENT: PERR, EOMI, Normal Hearing, MMM  Neck: Soft and supple, No LAD, No JVD  Respiratory: Breath sounds are clear bilaterally, No wheezing, rales or rhonchi  Cardiovascular: S1 and S2, regular rate and rhythm, no Murmurs, gallops or rubs  Gastrointestinal: Bowel Sounds present, soft, nontender, nondistended, no guarding, no rebound  Extremities: No peripheral edema      MEDICATIONS:  MEDICATIONS  (STANDING):  ALBUTerol/ipratropium for Nebulization 3 milliLiter(s) Nebulizer every 6 hours  ALPRAZolam 2 milliGRAM(s) Oral every 8 hours  diphenhydrAMINE   Injectable 10 milliGRAM(s) IV Push at bedtime  docusate sodium 100 milliGRAM(s) Oral three times a day  DULoxetine 60 milliGRAM(s) Oral daily  FIRST- Mouthwash  BLM 5 milliLiter(s) Swish and Swallow every 6 hours  gabapentin 300 milliGRAM(s) Oral two times a day  heparin  Injectable 5000 Unit(s) SubCutaneous every 8 hours  hydrALAZINE 25 milliGRAM(s) Oral every 12 hours  metoprolol succinate  milliGRAM(s) Oral daily  micafungin IVPB 100 milliGRAM(s) IV Intermittent every 24 hours  micafungin IVPB      morphine ER Tablet 200 milliGRAM(s) Oral three times a day  nystatin    Suspension [id number] Unit(s) Swish and Swallow three times a day  senna 2 Tablet(s) Oral at bedtime      LABS: All Labs Reviewed:                Blood Culture:     Radiology: reviewed

## 2018-05-14 LAB
ANION GAP SERPL CALC-SCNC: 13 MMOL/L — SIGNIFICANT CHANGE UP (ref 7–14)
APTT BLD: 49.7 SEC — HIGH (ref 27–39.2)
BASOPHILS # BLD AUTO: 0.04 K/UL — SIGNIFICANT CHANGE UP (ref 0–0.2)
BASOPHILS NFR BLD AUTO: 0.6 % — SIGNIFICANT CHANGE UP (ref 0–1)
BLD GP AB SCN SERPL QL: SIGNIFICANT CHANGE UP
BUN SERPL-MCNC: 11 MG/DL — SIGNIFICANT CHANGE UP (ref 10–20)
CALCIUM SERPL-MCNC: 9.3 MG/DL — SIGNIFICANT CHANGE UP (ref 8.5–10.1)
CHLORIDE SERPL-SCNC: 98 MMOL/L — SIGNIFICANT CHANGE UP (ref 98–110)
CO2 SERPL-SCNC: 30 MMOL/L — SIGNIFICANT CHANGE UP (ref 17–32)
CREAT SERPL-MCNC: 0.8 MG/DL — SIGNIFICANT CHANGE UP (ref 0.7–1.5)
EOSINOPHIL # BLD AUTO: 0.23 K/UL — SIGNIFICANT CHANGE UP (ref 0–0.7)
EOSINOPHIL NFR BLD AUTO: 3.3 % — SIGNIFICANT CHANGE UP (ref 0–8)
GLUCOSE SERPL-MCNC: 89 MG/DL — SIGNIFICANT CHANGE UP (ref 70–99)
HCT VFR BLD CALC: 40.8 % — SIGNIFICANT CHANGE UP (ref 37–47)
HGB BLD-MCNC: 13 G/DL — SIGNIFICANT CHANGE UP (ref 12–16)
IMM GRANULOCYTES NFR BLD AUTO: 0.3 % — SIGNIFICANT CHANGE UP (ref 0.1–0.3)
INR BLD: 0.96 RATIO — SIGNIFICANT CHANGE UP (ref 0.65–1.3)
LYMPHOCYTES # BLD AUTO: 2.47 K/UL — SIGNIFICANT CHANGE UP (ref 1.2–3.4)
LYMPHOCYTES # BLD AUTO: 35.7 % — SIGNIFICANT CHANGE UP (ref 20.5–51.1)
MCHC RBC-ENTMCNC: 27.5 PG — SIGNIFICANT CHANGE UP (ref 27–31)
MCHC RBC-ENTMCNC: 31.9 G/DL — LOW (ref 32–37)
MCV RBC AUTO: 86.3 FL — SIGNIFICANT CHANGE UP (ref 81–99)
MONOCYTES # BLD AUTO: 0.63 K/UL — HIGH (ref 0.1–0.6)
MONOCYTES NFR BLD AUTO: 9.1 % — SIGNIFICANT CHANGE UP (ref 1.7–9.3)
NEUTROPHILS # BLD AUTO: 3.52 K/UL — SIGNIFICANT CHANGE UP (ref 1.4–6.5)
NEUTROPHILS NFR BLD AUTO: 51 % — SIGNIFICANT CHANGE UP (ref 42.2–75.2)
NRBC # BLD: 0 /100 WBCS — SIGNIFICANT CHANGE UP (ref 0–0)
PLATELET # BLD AUTO: 193 K/UL — SIGNIFICANT CHANGE UP (ref 130–400)
POTASSIUM SERPL-MCNC: 4.1 MMOL/L — SIGNIFICANT CHANGE UP (ref 3.5–5)
POTASSIUM SERPL-SCNC: 4.1 MMOL/L — SIGNIFICANT CHANGE UP (ref 3.5–5)
PROTHROM AB SERPL-ACNC: 10.4 SEC — SIGNIFICANT CHANGE UP (ref 9.95–12.87)
RBC # BLD: 4.73 M/UL — SIGNIFICANT CHANGE UP (ref 4.2–5.4)
RBC # FLD: 13.3 % — SIGNIFICANT CHANGE UP (ref 11.5–14.5)
SODIUM SERPL-SCNC: 141 MMOL/L — SIGNIFICANT CHANGE UP (ref 135–146)
TYPE + AB SCN PNL BLD: SIGNIFICANT CHANGE UP
WBC # BLD: 6.91 K/UL — SIGNIFICANT CHANGE UP (ref 4.8–10.8)
WBC # FLD AUTO: 6.91 K/UL — SIGNIFICANT CHANGE UP (ref 4.8–10.8)

## 2018-05-14 RX ORDER — DIPHENHYDRAMINE HCL 50 MG
10 CAPSULE ORAL DAILY
Qty: 0 | Refills: 0 | Status: DISCONTINUED | OUTPATIENT
Start: 2018-05-14 | End: 2018-05-16

## 2018-05-14 RX ORDER — HEPARIN SODIUM 5000 [USP'U]/ML
5000 INJECTION INTRAVENOUS; SUBCUTANEOUS EVERY 8 HOURS
Qty: 0 | Refills: 0 | Status: DISCONTINUED | OUTPATIENT
Start: 2018-05-14 | End: 2018-05-16

## 2018-05-14 RX ADMIN — MORPHINE SULFATE 200 MILLIGRAM(S): 50 CAPSULE, EXTENDED RELEASE ORAL at 18:45

## 2018-05-14 RX ADMIN — HEPARIN SODIUM 5000 UNIT(S): 5000 INJECTION INTRAVENOUS; SUBCUTANEOUS at 22:54

## 2018-05-14 RX ADMIN — MORPHINE SULFATE 200 MILLIGRAM(S): 50 CAPSULE, EXTENDED RELEASE ORAL at 03:59

## 2018-05-14 RX ADMIN — MORPHINE SULFATE 200 MILLIGRAM(S): 50 CAPSULE, EXTENDED RELEASE ORAL at 23:30

## 2018-05-14 RX ADMIN — DULOXETINE HYDROCHLORIDE 60 MILLIGRAM(S): 30 CAPSULE, DELAYED RELEASE ORAL at 12:28

## 2018-05-14 RX ADMIN — DIPHENHYDRAMINE HYDROCHLORIDE AND LIDOCAINE HYDROCHLORIDE AND ALUMINUM HYDROXIDE AND MAGNESIUM HYDRO 5 MILLILITER(S): KIT at 23:04

## 2018-05-14 RX ADMIN — MORPHINE SULFATE 200 MILLIGRAM(S): 50 CAPSULE, EXTENDED RELEASE ORAL at 06:45

## 2018-05-14 RX ADMIN — Medication 2 MILLIGRAM(S): at 22:59

## 2018-05-14 RX ADMIN — DIPHENHYDRAMINE HYDROCHLORIDE AND LIDOCAINE HYDROCHLORIDE AND ALUMINUM HYDROXIDE AND MAGNESIUM HYDRO 5 MILLILITER(S): KIT at 12:27

## 2018-05-14 RX ADMIN — Medication 2 MILLIGRAM(S): at 06:45

## 2018-05-14 RX ADMIN — HYDROMORPHONE HYDROCHLORIDE 8 MILLIGRAM(S): 2 INJECTION INTRAMUSCULAR; INTRAVENOUS; SUBCUTANEOUS at 10:50

## 2018-05-14 RX ADMIN — Medication 2 MILLIGRAM(S): at 14:34

## 2018-05-14 RX ADMIN — GABAPENTIN 300 MILLIGRAM(S): 400 CAPSULE ORAL at 17:46

## 2018-05-14 RX ADMIN — DIPHENHYDRAMINE HYDROCHLORIDE AND LIDOCAINE HYDROCHLORIDE AND ALUMINUM HYDROXIDE AND MAGNESIUM HYDRO 5 MILLILITER(S): KIT at 17:45

## 2018-05-14 RX ADMIN — Medication 500000 UNIT(S): at 22:54

## 2018-05-14 RX ADMIN — MORPHINE SULFATE 200 MILLIGRAM(S): 50 CAPSULE, EXTENDED RELEASE ORAL at 14:34

## 2018-05-14 RX ADMIN — MORPHINE SULFATE 200 MILLIGRAM(S): 50 CAPSULE, EXTENDED RELEASE ORAL at 22:59

## 2018-05-14 RX ADMIN — MICAFUNGIN SODIUM 105 MILLIGRAM(S): 100 INJECTION, POWDER, LYOPHILIZED, FOR SOLUTION INTRAVENOUS at 18:44

## 2018-05-14 RX ADMIN — Medication 25 MILLIGRAM(S): at 17:46

## 2018-05-14 RX ADMIN — HYDROMORPHONE HYDROCHLORIDE 8 MILLIGRAM(S): 2 INJECTION INTRAMUSCULAR; INTRAVENOUS; SUBCUTANEOUS at 12:26

## 2018-05-14 NOTE — PROGRESS NOTE ADULT - ASSESSMENT
47y old Female  with significant PMH of CHF s/p AICD, DVT on xarelto, COPD, RA, laryngeal cancer s/p excision in november, s/p  2 week history of esophageal thrush 2 weeks ago status post PO diflucan treatment, presented for worsening fatigue and generalized weakness and odynophagia. Admitted for laryngeal thrush    #) Laryngeal Candidiasis  - ENT following: pt scoped at bedside, net improvement in pharyngolaryngeal thrush  - NPO for EGD  - continue nystatin swish & swallow TID x1 week , Hurricaine q6H.   - continue Micafungin 100 mg iv q24h  - EGD today for esophageal scrapings.  - pain control; add benadryl IV q24    #) Hypertension  - continue metoprolol and hydralazine  - monitor blood pressure    #) Throat cancer  - s/p excision in Nov 2017  - follows Dr. Sultana  - last chemo Jan 2018  - outpt follow up with hem/Onc    #) H/o DVT  - Will hold Xarelto until EGD is done    #) Full code status . 47y old Female  with significant PMH of CHF s/p AICD, DVT on xarelto, COPD, RA, laryngeal cancer s/p excision in november, s/p  2 week history of esophageal thrush 2 weeks ago status post PO diflucan treatment, presented for worsening fatigue and generalized weakness and odynophagia. Admitted for laryngeal thrush    #) Laryngeal Candidiasis  - ENT following: pt scoped at bedside, net improvement in pharyngolaryngeal thrush  - Soft diet  - continue nystatin swish & swallow TID x1 week , Hurricaine q6H.   - continue Micafungin 100 mg iv q24h  - EGD tomorrow at 2pm  - pain control; add benadryl IV q24    #) Hypertension  - continue metoprolol and hydralazine  - monitor blood pressure    #) Throat cancer  - s/p excision in Nov 2017  - follows Dr. Sultana  - last chemo Jan 2018  - outpt follow up with hem/Onc    #) H/o DVT  - Will hold Xarelto until EGD is done    #) Disposition  - discharge to home once patient is stable     #) Full code status

## 2018-05-14 NOTE — DIETITIAN INITIAL EVALUATION ADULT. - ORAL INTAKE PTA
pt. reports poor PO intake since January, tolerating mostly soft foods, no supplement use other than MVI daily, pt. doesn't like nutritional shakes as they taste like medicine to her/poor

## 2018-05-14 NOTE — PROGRESS NOTE ADULT - SUBJECTIVE AND OBJECTIVE BOX
SUBJECTIVE:    Patient is a 47y old Female who presents with a chief complaint of Fatigue and generalized weakness (08 May 2018 05:46)    Currently admitted to medicine with the primary diagnosis of Thrush     Today is hospital day 7d. This morning she is resting comfortably in bed and reports no new issues or overnight events.     PAST MEDICAL & SURGICAL HISTORY  DVT (deep venous thrombosis)  COPD (chronic obstructive pulmonary disease)  CHF (congestive heart failure)  HTN (hypertension)  RA (rheumatoid arthritis)  Throat cancer  AICD (automatic cardioverter/defibrillator) present  Lupus  History of laryngeal cancer  S/P cholecystectomy  S/P appendectomy    SOCIAL HISTORY:  Negative for smoking/alcohol/drug use.     ALLERGIES:  Avelox (Other)  Plaquenil (Other)  Vantin (Other (Mild))    MEDICATIONS:  STANDING MEDICATIONS  ALBUTerol/ipratropium for Nebulization 3 milliLiter(s) Nebulizer every 6 hours  ALPRAZolam 2 milliGRAM(s) Oral every 8 hours  docusate sodium 100 milliGRAM(s) Oral three times a day  DULoxetine 60 milliGRAM(s) Oral daily  FIRST- Mouthwash  BLM 5 milliLiter(s) Swish and Swallow every 6 hours  gabapentin 300 milliGRAM(s) Oral two times a day  heparin  Injectable 5000 Unit(s) SubCutaneous every 8 hours  hydrALAZINE 25 milliGRAM(s) Oral every 12 hours  metoprolol succinate  milliGRAM(s) Oral daily  micafungin IVPB 100 milliGRAM(s) IV Intermittent every 24 hours  micafungin IVPB      morphine ER Tablet 200 milliGRAM(s) Oral three times a day  nystatin    Suspension [id number] Unit(s) Swish and Swallow three times a day  senna 2 Tablet(s) Oral at bedtime    PRN MEDICATIONS  HYDROmorphone   Tablet 8 milliGRAM(s) Oral every 4 hours PRN  ondansetron    Tablet 8 milliGRAM(s) Oral every 8 hours PRN    VITALS:   T(F): 98.6  HR: 63  BP: 143/77  RR: 20      PHYSICAL EXAM:  GEN: No acute distress, hoarseness of voice  LUNGS: Clear to auscultation bilaterally   HEART: S1/S2 present. RRR.   ABD: Soft, non-tender, non-distended. Bowel sounds present  EXT: NC/NC/NE/2+PP/HUNTER  NEURO: AAOX3

## 2018-05-14 NOTE — DIETITIAN INITIAL EVALUATION ADULT. - ADHERENCE
n/a/regular diet, pt. reports she was told by outpatient MD, that she can eat "whatever she tolerates", NKLINO

## 2018-05-14 NOTE — PROGRESS NOTE ADULT - ATTENDING COMMENTS
47y old Female  with significant PMH of CHF s/p AICD, DVT on xarelto, COPD, RA, laryngeal cancer s/p excision in november, s/p  2 week history of esophageal thrush 2 weeks ago status post PO diflucan treatment, presented for worsening fatigue and generalized weakness and odynophagia. Admitted for laryngeal thrush    S : Pain only slightly better. Eating.   No CP/N/V.     A/P :     #) Laryngeal Candidiasis (patient treated previously with diflucan 200mg q24 for 10 days completed on 05/04/18)  - ENT following: pt scoped at bedside, net improvement in pharyngolaryngeal thrush  - pain improving  - continue nystatin swish & swallow TID x1 week , Hurricaine q6H.   - mechanical soft diet  - pain control  Micafungin 100 mg iv q24h   HIV test neg.  - EGD friday cancelled since patient has been on Xarelto. D/kalyan Xarelto friday. May do sq hep for now.   For EGD/Biopsy tomm. NPO after midnight. Hold heparin in AM.   -    #) Hypertension  - continue metoprolol and hydralazine  - monitor blood pressure    #) Throat cancer  - s/p excision in Nov 2017  - follows Dr. Sultana  - last chemo Jan 2018  - outpt follow up with hem/Onc    #Has right mandible angle point tenderness and may be a discrete swelling ? Check with Onc. Consider a CT scan or MRI to better assess for any possible infection vs cancer recurrence in that area ??     #) H/o DVT  - Will hold Xarelto until EGD is done    #) Full code status .

## 2018-05-14 NOTE — DIETITIAN INITIAL EVALUATION ADULT. - DIET TYPE
pt. reports 0-25% PO intake, refusing honey thick liquids/dysphagia 3, soft, honey consistency fluid

## 2018-05-14 NOTE — DIETITIAN INITIAL EVALUATION ADULT. - FACTORS AFF FOOD INTAKE
difficulty swallowing/pt. reports ongoing severe nausea and episodes of vomiting, cannot swallow anything hard such as nuts or spicy foods, last BM 3 days ago per pt. last documented 5/12/persistent nausea

## 2018-05-15 LAB
ANION GAP SERPL CALC-SCNC: 10 MMOL/L — SIGNIFICANT CHANGE UP (ref 7–14)
BASOPHILS # BLD AUTO: 0.03 K/UL — SIGNIFICANT CHANGE UP (ref 0–0.2)
BASOPHILS NFR BLD AUTO: 0.5 % — SIGNIFICANT CHANGE UP (ref 0–1)
BUN SERPL-MCNC: 10 MG/DL — SIGNIFICANT CHANGE UP (ref 10–20)
CALCIUM SERPL-MCNC: 9.1 MG/DL — SIGNIFICANT CHANGE UP (ref 8.5–10.1)
CHLORIDE SERPL-SCNC: 99 MMOL/L — SIGNIFICANT CHANGE UP (ref 98–110)
CO2 SERPL-SCNC: 32 MMOL/L — SIGNIFICANT CHANGE UP (ref 17–32)
CREAT SERPL-MCNC: 0.8 MG/DL — SIGNIFICANT CHANGE UP (ref 0.7–1.5)
EOSINOPHIL # BLD AUTO: 0.22 K/UL — SIGNIFICANT CHANGE UP (ref 0–0.7)
EOSINOPHIL NFR BLD AUTO: 3.7 % — SIGNIFICANT CHANGE UP (ref 0–8)
GLUCOSE SERPL-MCNC: 95 MG/DL — SIGNIFICANT CHANGE UP (ref 70–99)
HCT VFR BLD CALC: 37.2 % — SIGNIFICANT CHANGE UP (ref 37–47)
HGB BLD-MCNC: 12.2 G/DL — SIGNIFICANT CHANGE UP (ref 12–16)
IMM GRANULOCYTES NFR BLD AUTO: 0.5 % — HIGH (ref 0.1–0.3)
LYMPHOCYTES # BLD AUTO: 2.68 K/UL — SIGNIFICANT CHANGE UP (ref 1.2–3.4)
LYMPHOCYTES # BLD AUTO: 44.6 % — SIGNIFICANT CHANGE UP (ref 20.5–51.1)
MCHC RBC-ENTMCNC: 28.1 PG — SIGNIFICANT CHANGE UP (ref 27–31)
MCHC RBC-ENTMCNC: 32.8 G/DL — SIGNIFICANT CHANGE UP (ref 32–37)
MCV RBC AUTO: 85.7 FL — SIGNIFICANT CHANGE UP (ref 81–99)
MONOCYTES # BLD AUTO: 0.56 K/UL — SIGNIFICANT CHANGE UP (ref 0.1–0.6)
MONOCYTES NFR BLD AUTO: 9.3 % — SIGNIFICANT CHANGE UP (ref 1.7–9.3)
NEUTROPHILS # BLD AUTO: 2.49 K/UL — SIGNIFICANT CHANGE UP (ref 1.4–6.5)
NEUTROPHILS NFR BLD AUTO: 41.4 % — LOW (ref 42.2–75.2)
NRBC # BLD: 0 /100 WBCS — SIGNIFICANT CHANGE UP (ref 0–0)
PLATELET # BLD AUTO: 169 K/UL — SIGNIFICANT CHANGE UP (ref 130–400)
POTASSIUM SERPL-MCNC: 4.1 MMOL/L — SIGNIFICANT CHANGE UP (ref 3.5–5)
POTASSIUM SERPL-SCNC: 4.1 MMOL/L — SIGNIFICANT CHANGE UP (ref 3.5–5)
RBC # BLD: 4.34 M/UL — SIGNIFICANT CHANGE UP (ref 4.2–5.4)
RBC # FLD: 13.2 % — SIGNIFICANT CHANGE UP (ref 11.5–14.5)
SODIUM SERPL-SCNC: 141 MMOL/L — SIGNIFICANT CHANGE UP (ref 135–146)
WBC # BLD: 6.01 K/UL — SIGNIFICANT CHANGE UP (ref 4.8–10.8)
WBC # FLD AUTO: 6.01 K/UL — SIGNIFICANT CHANGE UP (ref 4.8–10.8)

## 2018-05-15 RX ORDER — BENZOCAINE 10 %
1 GEL (GRAM) MUCOUS MEMBRANE
Qty: 0 | Refills: 0 | Status: DISCONTINUED | OUTPATIENT
Start: 2018-05-15 | End: 2018-05-18

## 2018-05-15 RX ADMIN — MORPHINE SULFATE 200 MILLIGRAM(S): 50 CAPSULE, EXTENDED RELEASE ORAL at 21:55

## 2018-05-15 RX ADMIN — HYDROMORPHONE HYDROCHLORIDE 8 MILLIGRAM(S): 2 INJECTION INTRAMUSCULAR; INTRAVENOUS; SUBCUTANEOUS at 17:56

## 2018-05-15 RX ADMIN — HYDROMORPHONE HYDROCHLORIDE 8 MILLIGRAM(S): 2 INJECTION INTRAMUSCULAR; INTRAVENOUS; SUBCUTANEOUS at 20:19

## 2018-05-15 RX ADMIN — Medication 1 SPRAY(S): at 17:47

## 2018-05-15 RX ADMIN — MORPHINE SULFATE 200 MILLIGRAM(S): 50 CAPSULE, EXTENDED RELEASE ORAL at 17:56

## 2018-05-15 RX ADMIN — GABAPENTIN 300 MILLIGRAM(S): 400 CAPSULE ORAL at 06:31

## 2018-05-15 RX ADMIN — GABAPENTIN 300 MILLIGRAM(S): 400 CAPSULE ORAL at 17:48

## 2018-05-15 RX ADMIN — HEPARIN SODIUM 5000 UNIT(S): 5000 INJECTION INTRAVENOUS; SUBCUTANEOUS at 21:56

## 2018-05-15 RX ADMIN — DULOXETINE HYDROCHLORIDE 60 MILLIGRAM(S): 30 CAPSULE, DELAYED RELEASE ORAL at 12:53

## 2018-05-15 RX ADMIN — MORPHINE SULFATE 200 MILLIGRAM(S): 50 CAPSULE, EXTENDED RELEASE ORAL at 15:26

## 2018-05-15 RX ADMIN — Medication 500000 UNIT(S): at 15:27

## 2018-05-15 RX ADMIN — Medication 25 MILLIGRAM(S): at 17:48

## 2018-05-15 RX ADMIN — MICAFUNGIN SODIUM 105 MILLIGRAM(S): 100 INJECTION, POWDER, LYOPHILIZED, FOR SOLUTION INTRAVENOUS at 17:47

## 2018-05-15 RX ADMIN — Medication 500000 UNIT(S): at 21:56

## 2018-05-15 RX ADMIN — MORPHINE SULFATE 200 MILLIGRAM(S): 50 CAPSULE, EXTENDED RELEASE ORAL at 06:34

## 2018-05-15 RX ADMIN — MORPHINE SULFATE 200 MILLIGRAM(S): 50 CAPSULE, EXTENDED RELEASE ORAL at 22:25

## 2018-05-15 RX ADMIN — MORPHINE SULFATE 200 MILLIGRAM(S): 50 CAPSULE, EXTENDED RELEASE ORAL at 06:49

## 2018-05-15 RX ADMIN — HYDROMORPHONE HYDROCHLORIDE 8 MILLIGRAM(S): 2 INJECTION INTRAMUSCULAR; INTRAVENOUS; SUBCUTANEOUS at 12:53

## 2018-05-15 RX ADMIN — DIPHENHYDRAMINE HYDROCHLORIDE AND LIDOCAINE HYDROCHLORIDE AND ALUMINUM HYDROXIDE AND MAGNESIUM HYDRO 5 MILLILITER(S): KIT at 06:39

## 2018-05-15 RX ADMIN — DIPHENHYDRAMINE HYDROCHLORIDE AND LIDOCAINE HYDROCHLORIDE AND ALUMINUM HYDROXIDE AND MAGNESIUM HYDRO 5 MILLILITER(S): KIT at 17:48

## 2018-05-15 RX ADMIN — Medication 500000 UNIT(S): at 06:39

## 2018-05-15 RX ADMIN — Medication 100 MILLIGRAM(S): at 15:27

## 2018-05-15 RX ADMIN — Medication 2 MILLIGRAM(S): at 21:52

## 2018-05-15 RX ADMIN — Medication 2 MILLIGRAM(S): at 15:26

## 2018-05-15 RX ADMIN — Medication 2 MILLIGRAM(S): at 06:35

## 2018-05-15 NOTE — CONSULT NOTE ADULT - ASSESSMENT
This is a 48 y/o F with remote h/o laryngeal Ca s/p excision and RT now comes in with gen weakness and worsening hoarseness. Has laryngeal thrush which has improved on diflucan as per ENT.     Cervical lymphadenopathy with positive PET: No recurrent malignancy diagnosed so far on laryngeal, gingival ulcer or cervical LN biopsy. May repeat CT soft tissue neck with contrast.    Thrush: On diflucan and nystatin. Scheduled for EGD today.

## 2018-05-15 NOTE — PROGRESS NOTE ADULT - ASSESSMENT
47y old Female  with significant PMH of CHF s/p AICD, DVT on xarelto, COPD, RA, laryngeal cancer s/p excision in november, s/p  2 week history of esophageal thrush 2 weeks ago status post PO diflucan treatment, presented for worsening fatigue and generalized weakness and odynophagia. Admitted for laryngeal thrush    #) Laryngeal Candidiasis  - ENT following: pt scoped at bedside, net improvement in pharyngolaryngeal thrush  - NPO for EGD  - continue nystatin swish & swallow TID x1 week , Hurricaine q6H.   - continue Micafungin 100 mg iv q24h  - EGD today at 2pm  - pain control; add benadryl IV q24    #) Hypertension  - continue metoprolol and hydralazine  - monitor blood pressure    #) Throat cancer s/p excision in Nov 2017  - Onc consult placed for right mandible angle point tenderness with ?swelling  - last chemo Jan 2018  - will discuss with onc for imaging     #) H/o DVT  - Restart Xarelto 24hrs after EGD    #) Disposition  - discharge to home once patient is stable     #) Full code status

## 2018-05-15 NOTE — PROGRESS NOTE ADULT - SUBJECTIVE AND OBJECTIVE BOX
EWAILEANA  47y, Female      OVERNIGHT EVENTS:    no change in symptoms    VITALS:  T(F): 97.8, Max: 98.3 (05-14-18 @ 22:13)  HR: 60  BP: 95/51  RR: 20Vital Signs Last 24 Hrs  T(C): 36.6 (15 May 2018 06:54), Max: 36.8 (14 May 2018 22:13)  T(F): 97.8 (15 May 2018 06:54), Max: 98.3 (14 May 2018 22:13)  HR: 60 (15 May 2018 06:54) (60 - 71)  BP: 95/51 (15 May 2018 06:54) (95/51 - 152/76)  BP(mean): --  RR: 20 (15 May 2018 06:54) (20 - 20)  SpO2: 95% (15 May 2018 06:54) (95% - 95%)    TESTS & MEASUREMENTS:                        13.0   6.91  )-----------( 193      ( 14 May 2018 08:45 )             40.8     05-14    141  |  98  |  11  ----------------------------<  89  4.1   |  30  |  0.8    Ca    9.3      14 May 2018 08:45                RADIOLOGY & ADDITIONAL TESTS:    ANTIBIOTICS:  micafungin IVPB 100 milliGRAM(s) IV Intermittent every 24 hours  micafungin IVPB      nystatin    Suspension [id number] Unit(s) Swish and Swallow three times a day

## 2018-05-15 NOTE — PROGRESS NOTE ADULT - ASSESSMENT
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  Non infectious causes in differential diagnosis  HIV is negative.    RECOMMENDATIONS:  Micafungin 100 mg iv q24h  Continue with oral Nystatin.  Bx today.

## 2018-05-15 NOTE — PROGRESS NOTE ADULT - SUBJECTIVE AND OBJECTIVE BOX
SUBJECTIVE:    Patient is a 47y old Female who presents with a chief complaint of Fatigue and generalized weakness (08 May 2018 05:46)    Currently admitted to medicine with the primary diagnosis of Thrush     Today is hospital day 8d. This morning she is resting comfortably in bed and reports no new issues or overnight events.     PAST MEDICAL & SURGICAL HISTORY  DVT (deep venous thrombosis)  COPD (chronic obstructive pulmonary disease)  CHF (congestive heart failure)  HTN (hypertension)  RA (rheumatoid arthritis)  Throat cancer  AICD (automatic cardioverter/defibrillator) present  Lupus  History of laryngeal cancer  S/P cholecystectomy  S/P appendectomy    SOCIAL HISTORY:  Negative for smoking/alcohol/drug use.     ALLERGIES:  Avelox (Other)  Plaquenil (Other)  Vantin (Other (Mild))    MEDICATIONS:  STANDING MEDICATIONS  ALBUTerol/ipratropium for Nebulization 3 milliLiter(s) Nebulizer every 6 hours  ALPRAZolam 2 milliGRAM(s) Oral every 8 hours  diphenhydrAMINE   Injectable 10 milliGRAM(s) IV Push daily  docusate sodium 100 milliGRAM(s) Oral three times a day  DULoxetine 60 milliGRAM(s) Oral daily  FIRST- Mouthwash  BLM 5 milliLiter(s) Swish and Swallow every 6 hours  gabapentin 300 milliGRAM(s) Oral two times a day  heparin  Injectable 5000 Unit(s) SubCutaneous every 8 hours  hydrALAZINE 25 milliGRAM(s) Oral every 12 hours  metoprolol succinate  milliGRAM(s) Oral daily  micafungin IVPB 100 milliGRAM(s) IV Intermittent every 24 hours  micafungin IVPB      morphine ER Tablet 200 milliGRAM(s) Oral three times a day  nystatin    Suspension [id number] Unit(s) Swish and Swallow three times a day  senna 2 Tablet(s) Oral at bedtime    PRN MEDICATIONS  HYDROmorphone   Tablet 8 milliGRAM(s) Oral every 4 hours PRN  ondansetron    Tablet 8 milliGRAM(s) Oral every 8 hours PRN    VITALS:   T(F): 97.8  HR: 60  BP: 95/51  RR: 20  SpO2: 95%    LABS:                        12.2   6.01  )-----------( 169      ( 15 May 2018 09:25 )             37.2     05-14    141  |  98  |  11  ----------------------------<  89  4.1   |  30  |  0.8    Ca    9.3      14 May 2018 08:45      PT/INR - ( 14 May 2018 08:45 )   PT: 10.40 sec;   INR: 0.96 ratio         PTT - ( 14 May 2018 08:45 )  PTT:49.7 sec      PHYSICAL EXAM:  GEN: No acute distress, hoarseness of voice  LUNGS: Clear to auscultation bilaterally   HEART: S1/S2 present. RRR.   ABD: Soft, non-tender, non-distended. Bowel sounds present  EXT: NC/NC/NE/2+PP/HUNTRE  NEURO: AAOX3

## 2018-05-15 NOTE — CONSULT NOTE ADULT - SUBJECTIVE AND OBJECTIVE BOX
HPI:  48 yo female with significant PMH of CHF s/p AICD, DVT on xarelto, COPD, RA, laryngeal cancer s/p excision in november and chemotherapy with 2 week history of esophageal thrush status post PO diflucan treatment, presented for worsening fatigue and generalized weakness. She also endorsed recent chest pain when eating and taking a deep breath. She also noticed 20 lb weight gain in the past two months. She denies chest pain on exertion or shortness of breath. She was seen in ED by ENT and bedside visualization of the pharynx showed resolving infection, though patient still has hoarsness of her voice.     PAST MEDICAL & SURGICAL HISTORY:  DVT (deep venous thrombosis)  COPD (chronic obstructive pulmonary disease)  CHF (congestive heart failure)  HTN (hypertension)  RA (rheumatoid arthritis)  Throat cancer  AICD (automatic cardioverter/defibrillator) present  Lupus  History of laryngeal cancer  S/P cholecystectomy  S/P appendectomy    MEDICATIONS  (STANDING):  ALBUTerol/ipratropium for Nebulization 3 milliLiter(s) Nebulizer every 6 hours  ALPRAZolam 2 milliGRAM(s) Oral every 8 hours  diphenhydrAMINE   Injectable 10 milliGRAM(s) IV Push daily  docusate sodium 100 milliGRAM(s) Oral three times a day  DULoxetine 60 milliGRAM(s) Oral daily  FIRST- Mouthwash  BLM 5 milliLiter(s) Swish and Swallow every 6 hours  gabapentin 300 milliGRAM(s) Oral two times a day  heparin  Injectable 5000 Unit(s) SubCutaneous every 8 hours  hydrALAZINE 25 milliGRAM(s) Oral every 12 hours  metoprolol succinate  milliGRAM(s) Oral daily  micafungin IVPB 100 milliGRAM(s) IV Intermittent every 24 hours  micafungin IVPB      morphine ER Tablet 200 milliGRAM(s) Oral three times a day  nystatin    Suspension [id number] Unit(s) Swish and Swallow three times a day  senna 2 Tablet(s) Oral at bedtime    MEDICATIONS  (PRN):  HYDROmorphone   Tablet 8 milliGRAM(s) Oral every 4 hours PRN Severe Pain (7 - 10)  ondansetron    Tablet 8 milliGRAM(s) Oral every 8 hours PRN Nausea and/or Vomiting        Labs:                        12.2   6.01  )-----------( 169      ( 15 May 2018 09:25 )             37.2             05-15    141  |  99  |  10  ----------------------------<  95  4.1   |  32  |  0.8    Ca    9.1      15 May 2018 09:25              PT/INR - ( 14 May 2018 08:45 )   PT: 10.40 sec;   INR: 0.96 ratio         PTT - ( 14 May 2018 08:45 )  PTT:49.7 sec      < from: [facility] Duplex Lower Ext Vein Scan, Bilat (05.12.18 @ 08:48) >  Impression:    No evidence of deep venous thrombosis or superficial thrombophlebitis in   the bilateral lower extremities.    Baker's cyst as measured above    < end of copied text >    < from: Xray Chest 2 Views PA/Lat (05.07.18 @ 20:58) >    Impression:      No radiographic evidence of acute cardiopulmonary disease.    < end of copied text > HPI:  46 yo female with significant PMH of CHF s/p AICD, DVT on xarelto, COPD, RA, laryngeal cancer s/p excision several yrs ago with RT presented with 2 week history of esophageal thrush status post PO diflucan treatment, presented for worsening fatigue and generalized weakness. She also endorsed recent chest pain when eating and taking a deep breath. She also noticed 20 lb weight gain in the past two months. She denies chest pain on exertion or shortness of breath. She was seen in ED by ENT and bedside visualization of the pharynx showed resolving infection, though patient still has hoarsness of her voice.   She has had several biopsies by ENT recently all of which were negative for malignancy.    Last PET from 10/2017:    IMPRESSION:   Multiple FDG avid cervical lymph nodes (right posterior cervical, left level  1B, left level 3), FDG avid soft tissue at the base of the tongue/expected  region of the lingual tonsil, at the level of the midline true vocal cords, at  the left buccal mucosa overlying the mandibular alveolar ridge.  Direct  visualization is recommended. Of note, the left supraclavicular lymph node seen  on recent neck CT is not FDG avid.      Since each of these sites are FDG avid, biological tumor activity cannot be  excluded.      Highest SUV: 10.4 (left buccal mucosa) and 9.8 (vocal cords and base of the  tongue).      No other sites of abnormal FDG uptake.          PAST MEDICAL & SURGICAL HISTORY:  DVT (deep venous thrombosis)  COPD (chronic obstructive pulmonary disease)  CHF (congestive heart failure)  HTN (hypertension)  RA (rheumatoid arthritis)  Throat cancer  AICD (automatic cardioverter/defibrillator) present  Lupus  History of laryngeal cancer  S/P cholecystectomy  S/P appendectomy    MEDICATIONS  (STANDING):  ALBUTerol/ipratropium for Nebulization 3 milliLiter(s) Nebulizer every 6 hours  ALPRAZolam 2 milliGRAM(s) Oral every 8 hours  diphenhydrAMINE   Injectable 10 milliGRAM(s) IV Push daily  docusate sodium 100 milliGRAM(s) Oral three times a day  DULoxetine 60 milliGRAM(s) Oral daily  FIRST- Mouthwash  BLM 5 milliLiter(s) Swish and Swallow every 6 hours  gabapentin 300 milliGRAM(s) Oral two times a day  heparin  Injectable 5000 Unit(s) SubCutaneous every 8 hours  hydrALAZINE 25 milliGRAM(s) Oral every 12 hours  metoprolol succinate  milliGRAM(s) Oral daily  micafungin IVPB 100 milliGRAM(s) IV Intermittent every 24 hours  micafungin IVPB      morphine ER Tablet 200 milliGRAM(s) Oral three times a day  nystatin    Suspension [id number] Unit(s) Swish and Swallow three times a day  senna 2 Tablet(s) Oral at bedtime    MEDICATIONS  (PRN):  HYDROmorphone   Tablet 8 milliGRAM(s) Oral every 4 hours PRN Severe Pain (7 - 10)  ondansetron    Tablet 8 milliGRAM(s) Oral every 8 hours PRN Nausea and/or Vomiting    SH: Former smoker      Labs:                        12.2   6.01  )-----------( 169      ( 15 May 2018 09:25 )             37.2             05-15    141  |  99  |  10  ----------------------------<  95  4.1   |  32  |  0.8    Ca    9.1      15 May 2018 09:25              PT/INR - ( 14 May 2018 08:45 )   PT: 10.40 sec;   INR: 0.96 ratio         PTT - ( 14 May 2018 08:45 )  PTT:49.7 sec      < from: [facility] Duplex Lower Ext Vein Scan, Bilat (05.12.18 @ 08:48) >  Impression:    No evidence of deep venous thrombosis or superficial thrombophlebitis in   the bilateral lower extremities.    Baker's cyst as measured above    < end of copied text >    < from: Xray Chest 2 Views PA/Lat (05.07.18 @ 20:58) >    Impression:      No radiographic evidence of acute cardiopulmonary disease.    < end of copied text >

## 2018-05-16 ENCOUNTER — RESULT REVIEW (OUTPATIENT)
Age: 48
End: 2018-05-16

## 2018-05-16 LAB
APPEARANCE UR: (no result)
BILIRUB UR-MCNC: NEGATIVE — SIGNIFICANT CHANGE UP
COLOR SPEC: SIGNIFICANT CHANGE UP
DIFF PNL FLD: NEGATIVE — SIGNIFICANT CHANGE UP
EPI CELLS # UR: (no result) /HPF
GLUCOSE UR QL: NEGATIVE MG/DL — SIGNIFICANT CHANGE UP
KETONES UR-MCNC: NEGATIVE — SIGNIFICANT CHANGE UP
LEUKOCYTE ESTERASE UR-ACNC: (no result)
NITRITE UR-MCNC: NEGATIVE — SIGNIFICANT CHANGE UP
PH UR: 7 — SIGNIFICANT CHANGE UP (ref 5–8)
PROT UR-MCNC: 30 MG/DL
SP GR SPEC: 1.02 — SIGNIFICANT CHANGE UP (ref 1.01–1.03)
UROBILINOGEN FLD QL: 1 MG/DL (ref 0.2–0.2)
WBC UR QL: (no result) /HPF

## 2018-05-16 RX ORDER — ONDANSETRON 8 MG/1
4 TABLET, FILM COATED ORAL ONCE
Qty: 0 | Refills: 0 | Status: COMPLETED | OUTPATIENT
Start: 2018-05-16 | End: 2018-05-16

## 2018-05-16 RX ORDER — RIVAROXABAN 15 MG-20MG
20 KIT ORAL EVERY 24 HOURS
Qty: 0 | Refills: 0 | Status: DISCONTINUED | OUTPATIENT
Start: 2018-05-17 | End: 2018-05-18

## 2018-05-16 RX ORDER — MORPHINE SULFATE 50 MG/1
200 CAPSULE, EXTENDED RELEASE ORAL THREE TIMES A DAY
Qty: 0 | Refills: 0 | Status: DISCONTINUED | OUTPATIENT
Start: 2018-05-16 | End: 2018-05-18

## 2018-05-16 RX ORDER — HYDROMORPHONE HYDROCHLORIDE 2 MG/ML
8 INJECTION INTRAMUSCULAR; INTRAVENOUS; SUBCUTANEOUS EVERY 6 HOURS
Qty: 0 | Refills: 0 | Status: DISCONTINUED | OUTPATIENT
Start: 2018-05-16 | End: 2018-05-18

## 2018-05-16 RX ORDER — RIVAROXABAN 15 MG-20MG
20 KIT ORAL ONCE
Qty: 0 | Refills: 0 | Status: COMPLETED | OUTPATIENT
Start: 2018-05-16 | End: 2018-05-16

## 2018-05-16 RX ADMIN — MORPHINE SULFATE 200 MILLIGRAM(S): 50 CAPSULE, EXTENDED RELEASE ORAL at 07:30

## 2018-05-16 RX ADMIN — DIPHENHYDRAMINE HYDROCHLORIDE AND LIDOCAINE HYDROCHLORIDE AND ALUMINUM HYDROXIDE AND MAGNESIUM HYDRO 5 MILLILITER(S): KIT at 05:54

## 2018-05-16 RX ADMIN — DIPHENHYDRAMINE HYDROCHLORIDE AND LIDOCAINE HYDROCHLORIDE AND ALUMINUM HYDROXIDE AND MAGNESIUM HYDRO 5 MILLILITER(S): KIT at 14:10

## 2018-05-16 RX ADMIN — Medication 1 SPRAY(S): at 05:55

## 2018-05-16 RX ADMIN — MORPHINE SULFATE 200 MILLIGRAM(S): 50 CAPSULE, EXTENDED RELEASE ORAL at 22:48

## 2018-05-16 RX ADMIN — Medication 2 MILLIGRAM(S): at 14:10

## 2018-05-16 RX ADMIN — MICAFUNGIN SODIUM 105 MILLIGRAM(S): 100 INJECTION, POWDER, LYOPHILIZED, FOR SOLUTION INTRAVENOUS at 16:12

## 2018-05-16 RX ADMIN — Medication 100 MILLIGRAM(S): at 05:54

## 2018-05-16 RX ADMIN — MORPHINE SULFATE 200 MILLIGRAM(S): 50 CAPSULE, EXTENDED RELEASE ORAL at 06:40

## 2018-05-16 RX ADMIN — DULOXETINE HYDROCHLORIDE 60 MILLIGRAM(S): 30 CAPSULE, DELAYED RELEASE ORAL at 14:11

## 2018-05-16 RX ADMIN — Medication 2 MILLIGRAM(S): at 22:48

## 2018-05-16 RX ADMIN — Medication 500000 UNIT(S): at 14:11

## 2018-05-16 RX ADMIN — HYDROMORPHONE HYDROCHLORIDE 8 MILLIGRAM(S): 2 INJECTION INTRAMUSCULAR; INTRAVENOUS; SUBCUTANEOUS at 10:48

## 2018-05-16 RX ADMIN — ONDANSETRON 8 MILLIGRAM(S): 8 TABLET, FILM COATED ORAL at 18:04

## 2018-05-16 RX ADMIN — MORPHINE SULFATE 200 MILLIGRAM(S): 50 CAPSULE, EXTENDED RELEASE ORAL at 14:10

## 2018-05-16 RX ADMIN — MORPHINE SULFATE 200 MILLIGRAM(S): 50 CAPSULE, EXTENDED RELEASE ORAL at 15:00

## 2018-05-16 RX ADMIN — Medication 500000 UNIT(S): at 05:55

## 2018-05-16 RX ADMIN — Medication 25 MILLIGRAM(S): at 05:54

## 2018-05-16 RX ADMIN — HYDROMORPHONE HYDROCHLORIDE 8 MILLIGRAM(S): 2 INJECTION INTRAMUSCULAR; INTRAVENOUS; SUBCUTANEOUS at 19:44

## 2018-05-16 RX ADMIN — Medication 100 MILLIGRAM(S): at 14:10

## 2018-05-16 RX ADMIN — GABAPENTIN 300 MILLIGRAM(S): 400 CAPSULE ORAL at 05:54

## 2018-05-16 RX ADMIN — HYDROMORPHONE HYDROCHLORIDE 8 MILLIGRAM(S): 2 INJECTION INTRAMUSCULAR; INTRAVENOUS; SUBCUTANEOUS at 12:00

## 2018-05-16 RX ADMIN — Medication 2 MILLIGRAM(S): at 05:53

## 2018-05-16 RX ADMIN — MORPHINE SULFATE 200 MILLIGRAM(S): 50 CAPSULE, EXTENDED RELEASE ORAL at 23:18

## 2018-05-16 RX ADMIN — HEPARIN SODIUM 5000 UNIT(S): 5000 INJECTION INTRAVENOUS; SUBCUTANEOUS at 05:55

## 2018-05-16 RX ADMIN — ONDANSETRON 4 MILLIGRAM(S): 8 TABLET, FILM COATED ORAL at 18:14

## 2018-05-16 NOTE — PROGRESS NOTE ADULT - SUBJECTIVE AND OBJECTIVE BOX
SUBJECTIVE:    Patient is a 47y old Female who presents with a chief complaint of Fatigue and generalized weakness (08 May 2018 05:46)    Currently admitted to medicine with the primary diagnosis of Thrush     Today is hospital day 9d. This morning she is resting comfortably in bed and reports no new issues or overnight events. She continues to have throat pain but improving slowly. She complains of bleeding in the back of the mouth.    PAST MEDICAL & SURGICAL HISTORY  DVT (deep venous thrombosis)  COPD (chronic obstructive pulmonary disease)  CHF (congestive heart failure)  HTN (hypertension)  RA (rheumatoid arthritis)  Throat cancer  AICD (automatic cardioverter/defibrillator) present  Lupus  History of laryngeal cancer  S/P cholecystectomy  S/P appendectomy    SOCIAL HISTORY:  Negative for smoking/alcohol/drug use.     ALLERGIES:  Avelox (Other)  Plaquenil (Other)  Vantin (Other (Mild))    MEDICATIONS:  STANDING MEDICATIONS  ALBUTerol/ipratropium for Nebulization 3 milliLiter(s) Nebulizer every 6 hours  ALPRAZolam 2 milliGRAM(s) Oral every 8 hours  benzocaine 20% Spray 1 Spray(s) Topical two times a day  diphenhydrAMINE   Injectable 10 milliGRAM(s) IV Push daily  docusate sodium 100 milliGRAM(s) Oral three times a day  DULoxetine 60 milliGRAM(s) Oral daily  FIRST- Mouthwash  BLM 5 milliLiter(s) Swish and Swallow every 6 hours  gabapentin 300 milliGRAM(s) Oral two times a day  heparin  Injectable 5000 Unit(s) SubCutaneous every 8 hours  hydrALAZINE 25 milliGRAM(s) Oral every 12 hours  metoprolol succinate  milliGRAM(s) Oral daily  micafungin IVPB 100 milliGRAM(s) IV Intermittent every 24 hours  micafungin IVPB      morphine ER Tablet 200 milliGRAM(s) Oral three times a day  nystatin    Suspension [id number] Unit(s) Swish and Swallow three times a day  senna 2 Tablet(s) Oral at bedtime    PRN MEDICATIONS  ondansetron    Tablet 8 milliGRAM(s) Oral every 8 hours PRN    VITALS:   T(F): 97.6  HR: 63  BP: 132/60  RR: 20  SpO2: 96%    LABS:                        12.2   6.01  )-----------( 169      ( 15 May 2018 09:25 )             37.2     05-15    141  |  99  |  10  ----------------------------<  95  4.1   |  32  |  0.8    Ca    9.1      15 May 2018 09:25      PHYSICAL EXAM:  GEN: No acute distress, hoarseness of voice  HEENT: two bleeding points noted in the soft palate   LUNGS: Clear to auscultation bilaterally   HEART: S1/S2 present. RRR.   ABD: Soft, non-tender, non-distended. Bowel sounds present  EXT: NC/NC/NE/2+PP/HUNTER  NEURO: AAOX3

## 2018-05-16 NOTE — PROGRESS NOTE ADULT - SUBJECTIVE AND OBJECTIVE BOX
ILEANA MCNEIL  47y, Female      OVERNIGHT EVENTS:    S/P biopsy. Has oral bleed. Dysphagia better.    VITALS:  T(F): 97.6, Max: 98.5 (05-15-18 @ 21:28)  HR: 63  BP: 132/60  RR: 20Vital Signs Last 24 Hrs  T(C): 36.4 (16 May 2018 05:10), Max: 36.9 (15 May 2018 21:28)  T(F): 97.6 (16 May 2018 05:10), Max: 98.5 (15 May 2018 21:28)  HR: 63 (16 May 2018 05:10) (56 - 82)  BP: 132/60 (16 May 2018 05:10) (114/68 - 166/84)  BP(mean): --  RR: 20 (16 May 2018 05:10) (12 - 20)  SpO2: 96% (15 May 2018 15:15) (96% - 99%)    TESTS & MEASUREMENTS:                        12.2   6.01  )-----------( 169      ( 15 May 2018 09:25 )             37.2     05-15    141  |  99  |  10  ----------------------------<  95  4.1   |  32  |  0.8    Ca    9.1      15 May 2018 09:25                RADIOLOGY & ADDITIONAL TESTS:    ANTIBIOTICS:  micafungin IVPB 100 milliGRAM(s) IV Intermittent every 24 hours  micafungin IVPB      nystatin    Suspension [id number] Unit(s) Swish and Swallow three times a day

## 2018-05-16 NOTE — PROGRESS NOTE ADULT - ASSESSMENT
47y old Female  with significant PMH of CHF s/p AICD, DVT on xarelto, COPD, RA, laryngeal cancer s/p excision in november, s/p  2 week history of esophageal thrush 2 weeks ago status post PO diflucan treatment, presented for worsening fatigue and generalized weakness and odynophagia. Admitted for laryngeal thrush    #) Laryngeal Candidiasis  - ENT following: pt scoped at bedside, net improvement in pharyngolaryngeal thrush  - continue nystatin swish & swallow TID x1 week , Hurricaine q6H.   - continue Micafungin 100 mg iv q24h  - GI consult (Dr. Lamas): s/p EGD yesterday, pending results  - pain control; continue benadryl IV q24    #) Hypertension  - continue metoprolol and hydralazine  - monitor blood pressure    #) Throat cancer s/p excision in Nov 2017  - Onc consult appreciated: f/u CT of throat with contrast  - last chemo Jan 2018    #) H/o DVT  - Restart Xarelto tonight    #) Disposition  - discharge to home once patient is stable     #) Full code status

## 2018-05-16 NOTE — PROGRESS NOTE ADULT - EXTREMITIES
No cyanosis, clubbing or edema

## 2018-05-16 NOTE — SWALLOW BEDSIDE ASSESSMENT ADULT - SLP GENERAL OBSERVATIONS
Pt awake and alert with no c/o pain, +dysphonic vocal quality. Pt reports difficulty swallowing "for years", states she has never seen SLP

## 2018-05-16 NOTE — PROGRESS NOTE ADULT - ASSESSMENT
Laryngeal candidiasis with possible esophagitis.  Non infectious causes in differential diagnosis  HIV is negative.    RECOMMENDATIONS:  Micafungin 100 mg iv q24h  Continue with oral Nystatin.  F/U biopsy results

## 2018-05-17 LAB — NON-GYNECOLOGICAL CYTOLOGY STUDY: SIGNIFICANT CHANGE UP

## 2018-05-17 RX ORDER — FLUCONAZOLE 150 MG/1
200 TABLET ORAL DAILY
Qty: 0 | Refills: 0 | Status: DISCONTINUED | OUTPATIENT
Start: 2018-05-17 | End: 2018-05-18

## 2018-05-17 RX ADMIN — Medication 500000 UNIT(S): at 21:37

## 2018-05-17 RX ADMIN — DIPHENHYDRAMINE HYDROCHLORIDE AND LIDOCAINE HYDROCHLORIDE AND ALUMINUM HYDROXIDE AND MAGNESIUM HYDRO 5 MILLILITER(S): KIT at 17:41

## 2018-05-17 RX ADMIN — MORPHINE SULFATE 200 MILLIGRAM(S): 50 CAPSULE, EXTENDED RELEASE ORAL at 06:22

## 2018-05-17 RX ADMIN — MORPHINE SULFATE 200 MILLIGRAM(S): 50 CAPSULE, EXTENDED RELEASE ORAL at 13:38

## 2018-05-17 RX ADMIN — Medication 1 SPRAY(S): at 17:41

## 2018-05-17 RX ADMIN — MORPHINE SULFATE 200 MILLIGRAM(S): 50 CAPSULE, EXTENDED RELEASE ORAL at 18:53

## 2018-05-17 RX ADMIN — Medication 2 MILLIGRAM(S): at 13:39

## 2018-05-17 RX ADMIN — HYDROMORPHONE HYDROCHLORIDE 8 MILLIGRAM(S): 2 INJECTION INTRAMUSCULAR; INTRAVENOUS; SUBCUTANEOUS at 07:37

## 2018-05-17 RX ADMIN — GABAPENTIN 300 MILLIGRAM(S): 400 CAPSULE ORAL at 17:42

## 2018-05-17 RX ADMIN — HYDROMORPHONE HYDROCHLORIDE 8 MILLIGRAM(S): 2 INJECTION INTRAMUSCULAR; INTRAVENOUS; SUBCUTANEOUS at 18:53

## 2018-05-17 RX ADMIN — DIPHENHYDRAMINE HYDROCHLORIDE AND LIDOCAINE HYDROCHLORIDE AND ALUMINUM HYDROXIDE AND MAGNESIUM HYDRO 5 MILLILITER(S): KIT at 12:22

## 2018-05-17 RX ADMIN — FLUCONAZOLE 200 MILLIGRAM(S): 150 TABLET ORAL at 12:22

## 2018-05-17 RX ADMIN — Medication 25 MILLIGRAM(S): at 17:41

## 2018-05-17 RX ADMIN — Medication 2 MILLIGRAM(S): at 06:22

## 2018-05-17 RX ADMIN — RIVAROXABAN 20 MILLIGRAM(S): KIT at 10:34

## 2018-05-17 RX ADMIN — Medication 2 MILLIGRAM(S): at 21:32

## 2018-05-17 RX ADMIN — DULOXETINE HYDROCHLORIDE 60 MILLIGRAM(S): 30 CAPSULE, DELAYED RELEASE ORAL at 12:22

## 2018-05-17 RX ADMIN — MORPHINE SULFATE 200 MILLIGRAM(S): 50 CAPSULE, EXTENDED RELEASE ORAL at 21:32

## 2018-05-17 RX ADMIN — MORPHINE SULFATE 200 MILLIGRAM(S): 50 CAPSULE, EXTENDED RELEASE ORAL at 06:58

## 2018-05-17 RX ADMIN — HYDROMORPHONE HYDROCHLORIDE 8 MILLIGRAM(S): 2 INJECTION INTRAMUSCULAR; INTRAVENOUS; SUBCUTANEOUS at 20:22

## 2018-05-17 RX ADMIN — HYDROMORPHONE HYDROCHLORIDE 8 MILLIGRAM(S): 2 INJECTION INTRAMUSCULAR; INTRAVENOUS; SUBCUTANEOUS at 10:34

## 2018-05-17 RX ADMIN — Medication 500000 UNIT(S): at 13:39

## 2018-05-17 NOTE — CHART NOTE - NSCHARTNOTEFT_GEN_A_CORE
Registered Dietitian Follow-Up     Patient Profile Reviewed                           Yes [x]   No []     Nutrition History Previously Obtained        Yes [x]  No []       Pertinent Subjective Information: Pt. sitting up in bed during visit, reports no improvement with ability to eat on dys 3 mech soft honey thick diet. SLP assessed pt. rec dysphagia 3 mech soft thin liquids. Diet order has not been changed. Pt. states she has hard time with honey thick and consumes only thin liquids. Family at bedside. Will communicate with resident regarding SLP rec. Still not interested in supplements, states she'll eat better when diet changed to thin liquid.      Pertinent Medical Interventions: Laryngeal Candidiasis: ENT following: pt scoped at bedside, net improvement in pharyngolaryngeal thrush, continue nystatin swish & swallow TID, throat cancer: - s/p excision in Nov 2017; last chemo Jan 2018, CT neg for recurrence. Dispo: home.      Diet order: dysphagia 3 mech soft honey thick liquid     Anthropometrics:  - Ht. 160cm  - Wt. 75.7kg on 5/16        Pertinent Lab Data: no new labs documented      Pertinent Meds: Metoprolol, Senna      Physical Findings:  - Appearance: alert & oriented  - GI function: denies symptoms, last BM 5/16  - Tubes:  - Oral/Mouth cavity: continues with difficulty swallowing hard foods  - Skin: intact (BS 22)     Nutrition Requirements  Weight Used:     Estimated Energy Needs    Continue [x]  Adjust [] [id number]  Adjusted Energy Recommendations:   kcal/day        Estimated Protein Needs    Continue [x]  Adjust [] 71-78g  Adjusted Protein Recommendations:   gm/day        Estimated Fluid Needs        Continue [x]  Adjust [] 1ml/kcal  Adjusted Fluid Recommendations:   mL/day     Nutrient Intake: pt. reports 0-25% PO at meals         [] Previous Nutrition Diagnosis: Inadequate oral intake            [x] Ongoing          [] Resolved       Nutrition Intervention: meals and snacks    Rec: change diet order to dysphagia 3 mech soft thin liquids per SLP rec     Goal/Expected Outcome: In 4 days pt. to consume at least 50% PO at meals      Indicator/Monitoring: diet order, energy intake, body composition, NFPF

## 2018-05-17 NOTE — PROGRESS NOTE ADULT - ASSESSMENT
47y old Female  with significant PMH of CHF s/p AICD, DVT on xarelto, COPD, RA, laryngeal cancer s/p excision in november, s/p  2 week history of esophageal thrush 2 weeks ago status post PO diflucan treatment, presented for worsening fatigue and generalized weakness and odynophagia. Admitted for laryngeal thrush    #) Laryngeal Candidiasis  - ENT following: pt scoped at bedside, net improvement in pharyngolaryngeal thrush  - continue nystatin swish & swallow TID, Hurricaine q6H.   - continue Micafungin 100 mg iv q24h  - GI consult (Dr. Lamas): s/p EGD 05/15/18  - pain control; continue benadryl IV q24    #) Hypertension  - continue metoprolol and hydralazine  - monitor blood pressure    #) Throat cancer   - s/p excision in Nov 2017; last chemo Jan 2018  - Onc consult appreciated  - CT of neck with contrast:   	1. No CT correlate to thepatient's right mandibular angle pain.  	2.  The previously seen enlarged/hyperenhancing/FDG-avid the left level 1B lymph node is no longer visualized and there are new left submandibular surgical clips. A previously seen left posterior cervical lymph node has decreased in size (1 cm vs 1.3 cm).      #) H/o DVT  - continue xarelto    #) Disposition  - discharge to home once patient is stable     #) Full code status 47y old Female  with significant PMH of CHF s/p AICD, DVT on xarelto, COPD, RA, laryngeal cancer s/p excision in november, s/p  2 week history of esophageal thrush 2 weeks ago status post PO diflucan treatment, presented for worsening fatigue and generalized weakness and odynophagia. Admitted for laryngeal thrush    #) Laryngeal Candidiasis  - ENT following: pt scoped at bedside, net improvement in pharyngolaryngeal thrush  - continue nystatin swish & swallow TID, Hurricaine q6H.   - continue Micafungin 100 mg iv q24h  - Repeat blood cultures (05/15): negative  - GI consult (Dr. Lamas): s/p EGD 05/15/18  - pain control; continue benadryl IV q24    #) Hypertension  - continue metoprolol and hydralazine  - monitor blood pressure    #) Throat cancer   - s/p excision in Nov 2017; last chemo Jan 2018  - Onc consult appreciated  - CT of neck with contrast:   	1. No CT correlate to thepatient's right mandibular angle pain.  	2.  The previously seen enlarged/hyperenhancing/FDG-avid the left level 1B lymph node is no longer visualized and there are new left submandibular surgical clips. A previously seen left posterior cervical lymph node has decreased in size (1 cm vs 1.3 cm).      #) H/o DVT  - continue xarelto    #) Disposition  - discharge to home once patient is stable     #) Full code status 47y old Female  with significant PMH of CHF s/p AICD, DVT on xarelto, COPD, RA, laryngeal cancer s/p excision in november, s/p  2 week history of esophageal thrush 2 weeks ago status post PO diflucan treatment, presented for worsening fatigue and generalized weakness and odynophagia. Admitted for laryngeal thrush    #) Laryngeal Candidiasis  - ENT following: pt scoped at bedside, net improvement in pharyngolaryngeal thrush  - continue nystatin swish & swallow TID, Hurricaine q6H.   - start diflucan 200mg q24 (till 05/27); Micafungin d/kalyan  - Repeat blood cultures (05/15): negative  - GI consult (Dr. Lamas): s/p EGD 05/15/18  - pain control; continue benadryl IV q24    #) Hypertension  - continue metoprolol and hydralazine  - monitor blood pressure    #) Throat cancer   - s/p excision in Nov 2017; last chemo Jan 2018  - Onc consult appreciated  - CT of neck with contrast:   	1. No CT correlate to thepatient's right mandibular angle pain.  	2.  The previously seen enlarged/hyperenhancing/FDG-avid the left level 1B lymph node is no longer visualized and there are new left submandibular surgical clips. A previously seen left posterior cervical lymph node has decreased in size (1 cm vs 1.3 cm).      #) H/o DVT  - continue xarelto    #) Disposition  - discharge to home once patient is stable     #) Full code status 47y old Female  with significant PMH of CHF s/p AICD, DVT on xarelto, COPD, RA, laryngeal cancer s/p excision in november, s/p  2 week history of esophageal thrush 2 weeks ago status post PO diflucan treatment, presented for worsening fatigue and generalized weakness and odynophagia. Admitted for laryngeal thrush    #) Laryngeal Candidiasis  - ENT following: pt scoped at bedside, net improvement in pharyngolaryngeal thrush  - continue nystatin swish & swallow TID, Hurricaine q6H.   - start diflucan 200mg q24 (till 05/27); Micafungin d/kalyan today.   - Repeat blood cultures (05/15): negative  - GI consult (Dr. Lamas): s/p EGD 05/15/18  - pain control; continue benadryl IV q24    #) Hypertension  - continue metoprolol and hydralazine  - monitor blood pressure    #) Throat cancer   - s/p excision in Nov 2017; last chemo Jan 2018  - Onc consult appreciated  - CT of neck with contrast:   	1.  No CT findings correlate to the patient's right mandibular angle pain.  	2.  The previously seen enlarged/hyperenhancing/FDG-avid the left level 1B lymph node is no longer visualized and there are new left submandibular surgical clips. A previously seen left posterior cervical lymph node has decreased in size (1 cm vs 1.3 cm).    #) H/o DVT  - continue xarelto    #) Disposition  - discharge planing.     #) Full code status    Attending Attestation:  Pt seen and examined. Case and plan discussed at rounds. Agree with Resident Note as corrected above.   Oral Candidiasis / Cervical LAD (smaller in size than before)/ DVTs  Plan: Diflucan, Nystatin swish and swallow, off mycaphangin from today. Resume anticoagulation. No evidence of aspiration per speech pathology. Anticipate for d/c am. need to f/u with ONC, PMD, Pain Management.   Continue with care per orders.

## 2018-05-17 NOTE — SWALLOW FEES ASSESSMENT ADULT - PHARYNGEAL PHASE COMMENTS
Functional oropharyngeal swallow Functional oropharyngeal swallow. pt reports feeling food "stuck" in throat, however no pharyngeal residue noted following swallow. Pt report preference for soft food at this time

## 2018-05-17 NOTE — SWALLOW BEDSIDE ASSESSMENT ADULT - SWALLOW EVAL: DIAGNOSIS
Case discussed with MD and pt, SLP to f/u with FEES this afternoon. Pt in agreement to eveva
pt is NPO for EGD therefore po tolerationg could not be assessed
Min overt s/s aspiration/penetration for thin liquids, puree, mechanical soft.

## 2018-05-17 NOTE — SWALLOW BEDSIDE ASSESSMENT ADULT - SLP PERTINENT HISTORY OF CURRENT PROBLEM
Pt admitted with laryngeal thrush, now resolved per EGD note. Pt with h/o laryngeal ca, surgery x4. Recent biopsy of lesion on VC, negative per ENT attending.
laryngeal cancer s/p excision in november, s/p  2 week history of esophageal thrush 2 weeks ago status post PO diflucan treatment, presented for worsening fatigue and generalized weakness and odynophagia ENT is following
laryngeal cancer s/p excision in november, s/p  2 week history of esophageal thrush 2 weeks ago status post PO diflucan treatment, presented for worsening fatigue and generalized weakness and odynophagia ENT is following. s/p EGD showing resolving thrush and inflammation. Pt reports at least 2 PNAs per year "for awhile". Has never seen SLP

## 2018-05-17 NOTE — PROGRESS NOTE ADULT - ENMT COMMENTS
no oral thrush or vesicles visible.
no oral thrush/erythema/ulcers/vesicles.
no thrush visible
no oral thrush/vesicles.
no thrush

## 2018-05-17 NOTE — PROGRESS NOTE ADULT - ENMT
detailed exam
No oral lesions; no gross abnormalities
detailed exam
detailed exam

## 2018-05-17 NOTE — PROGRESS NOTE ADULT - GUM GEN PE MLT EXAM PC
Normal genitalia; no lesions; no discharge

## 2018-05-17 NOTE — SWALLOW FEES ASSESSMENT ADULT - PRELIMINARY ENDOSCOPIC EXAMINATIONS
pt noted with incomplete glottal closure; fullness of TVC noted. Mild-moderate edema throughout pharynx/Vocal fold adduction/abduction

## 2018-05-17 NOTE — PROGRESS NOTE ADULT - SUBJECTIVE AND OBJECTIVE BOX
SUBJECTIVE:    Patient is a 47y old Female who presents with a chief complaint of Fatigue and generalized weakness (08 May 2018 05:46)    Currently admitted to medicine with the primary diagnosis of Thrush     Today is hospital day 10d. This morning she is resting comfortably in bed and reports no new issues or overnight events.     PAST MEDICAL & SURGICAL HISTORY  DVT (deep venous thrombosis)  COPD (chronic obstructive pulmonary disease)  CHF (congestive heart failure)  HTN (hypertension)  RA (rheumatoid arthritis)  Throat cancer  AICD (automatic cardioverter/defibrillator) present  Lupus  History of laryngeal cancer  S/P cholecystectomy  S/P appendectomy    SOCIAL HISTORY:  Negative for smoking/alcohol/drug use.     ALLERGIES:  Avelox (Other)  Plaquenil (Other)  Vantin (Other (Mild))    MEDICATIONS:  STANDING MEDICATIONS  ALBUTerol/ipratropium for Nebulization 3 milliLiter(s) Nebulizer every 6 hours  ALPRAZolam 2 milliGRAM(s) Oral every 8 hours  benzocaine 20% Spray 1 Spray(s) Topical two times a day  docusate sodium 100 milliGRAM(s) Oral three times a day  DULoxetine 60 milliGRAM(s) Oral daily  FIRST- Mouthwash  BLM 5 milliLiter(s) Swish and Swallow every 6 hours  gabapentin 300 milliGRAM(s) Oral two times a day  hydrALAZINE 25 milliGRAM(s) Oral every 12 hours  metoprolol succinate  milliGRAM(s) Oral daily  micafungin IVPB 100 milliGRAM(s) IV Intermittent every 24 hours  micafungin IVPB      morphine ER Tablet 200 milliGRAM(s) Oral three times a day  nystatin    Suspension 405415 Unit(s) Swish and Swallow three times a day  rivaroxaban 20 milliGRAM(s) Oral every 24 hours  senna 2 Tablet(s) Oral at bedtime    PRN MEDICATIONS  HYDROmorphone   Tablet 8 milliGRAM(s) Oral every 6 hours PRN  ondansetron    Tablet 8 milliGRAM(s) Oral every 8 hours PRN    VITALS:   T(F): 97.6  HR: 61  BP: 109/43  RR: 18  SpO2: --    LABS:                        12.2   6.01  )-----------( 169      ( 15 May 2018 09:25 )             37.2     05-15    141  |  99  |  10  ----------------------------<  95  4.1   |  32  |  0.8    Ca    9.1      15 May 2018 09:25        Urinalysis Basic - ( 16 May 2018 20:45 )    Color: Dark Yellow / Appearance: Cloudy / S.020 / pH: x  Gluc: x / Ketone: Negative  / Bili: Negative / Urobili: 1.0 mg/dL   Blood: x / Protein: 30 mg/dL / Nitrite: Negative   Leuk Esterase: Small / RBC: x / WBC 6-10 /HPF   Sq Epi: x / Non Sq Epi: Moderate /HPF / Bacteria: x      Culture - Blood (collected 15 May 2018 17:51)  Source: .Blood None  Preliminary Report (17 May 2018 01:01):    No growth to date.    RADIOLOGY:  CT Neck Soft Tissue w/ IV Cont (18)  In comparison to the previous neck CT dated 10/12/2017:    1.  No CT correlate to thepatient's right mandibular angle pain.    2.  The previously seen enlarged/hyperenhancing/FDG-avid the left level 1B lymph node is no longer visualized and there are new left submandibular surgical clips. A previously seen left posterior cervical lymph node has decreased in size (1 cm vs 1.3 cm).    3.  The previously seen asymmetric hyperenhancement of the left buccal mucosa overlying the mandibular alveolar ridge is no longer visualized.    4.  Stable prominence of the supraglottic and glottic soft tissues, and stable prominence of the tongue base soft tissues.    5.  Stable 1 cm left thyroid nodule.      PHYSICAL EXAM:  GEN: No acute distress, hoarseness of voice  HEENT: two bleeding points noted in the soft palate   LUNGS: Clear to auscultation bilaterally   HEART: S1/S2 present. RRR.   ABD: Soft, non-tender, non-distended. Bowel sounds present  EXT: NC/NC/NE/2+PP/HUNTER  NEURO: AAOX3 SUBJECTIVE:    Patient is a 47y old Female who presents with a chief complaint of Fatigue and generalized weakness (08 May 2018 05:46)    Currently admitted to medicine with the primary diagnosis of Thrush     Today is hospital day 10d. This morning she is resting comfortably in bed and reports no new issues or overnight events. She continue to have throat pain.     PAST MEDICAL & SURGICAL HISTORY  DVT (deep venous thrombosis)  COPD (chronic obstructive pulmonary disease)  CHF (congestive heart failure)  HTN (hypertension)  RA (rheumatoid arthritis)  Throat cancer  AICD (automatic cardioverter/defibrillator) present  Lupus  History of laryngeal cancer  S/P cholecystectomy  S/P appendectomy    SOCIAL HISTORY:  Negative for smoking/alcohol/drug use.     ALLERGIES:  Avelox (Other)  Plaquenil (Other)  Vantin (Other (Mild))    MEDICATIONS:  STANDING MEDICATIONS  ALBUTerol/ipratropium for Nebulization 3 milliLiter(s) Nebulizer every 6 hours  ALPRAZolam 2 milliGRAM(s) Oral every 8 hours  benzocaine 20% Spray 1 Spray(s) Topical two times a day  docusate sodium 100 milliGRAM(s) Oral three times a day  DULoxetine 60 milliGRAM(s) Oral daily  FIRST- Mouthwash  BLM 5 milliLiter(s) Swish and Swallow every 6 hours  gabapentin 300 milliGRAM(s) Oral two times a day  hydrALAZINE 25 milliGRAM(s) Oral every 12 hours  metoprolol succinate  milliGRAM(s) Oral daily  micafungin IVPB 100 milliGRAM(s) IV Intermittent every 24 hours  micafungin IVPB      morphine ER Tablet 200 milliGRAM(s) Oral three times a day  nystatin    Suspension [id number] Unit(s) Swish and Swallow three times a day  rivaroxaban 20 milliGRAM(s) Oral every 24 hours  senna 2 Tablet(s) Oral at bedtime    PRN MEDICATIONS  HYDROmorphone   Tablet 8 milliGRAM(s) Oral every 6 hours PRN  ondansetron    Tablet 8 milliGRAM(s) Oral every 8 hours PRN    VITALS:   T(F): 97.6  HR: 61  BP: 109/43  RR: 18  SpO2: --    LABS:                        12.2   6.01  )-----------( 169      ( 15 May 2018 09:25 )             37.2     05-15    141  |  99  |  10  ----------------------------<  95  4.1   |  32  |  0.8    Ca    9.1      15 May 2018 09:25        Urinalysis Basic - ( 16 May 2018 20:45 )    Color: Dark Yellow / Appearance: Cloudy / S.020 / pH: x  Gluc: x / Ketone: Negative  / Bili: Negative / Urobili: 1.0 mg/dL   Blood: x / Protein: 30 mg/dL / Nitrite: Negative   Leuk Esterase: Small / RBC: x / WBC 6-10 /HPF   Sq Epi: x / Non Sq Epi: Moderate /HPF / Bacteria: x      Culture - Blood (collected 15 May 2018 17:51)  Source: .Blood None  Preliminary Report (17 May 2018 01:01):    No growth to date.    RADIOLOGY:  CT Neck Soft Tissue w/ IV Cont (18)  In comparison to the previous neck CT dated 10/12/2017:    1.  No CT correlate to the patient's right mandibular angle pain.  2.  The previously seen enlarged/hyperenhancing/FDG-avid the left level 1B lymph node is no longer visualized and there are new left submandibular surgical clips. A previously seen left posterior cervical lymph node has decreased in size (1 cm vs 1.3 cm).  3.  The previously seen asymmetric hyperenhancement of the left buccal mucosa overlying the mandibular alveolar ridge is no longer visualized.  4.  Stable prominence of the supraglottic and glottic soft tissues, and stable prominence of the tongue base soft tissues.  5.  Stable 1 cm left thyroid nodule.      PHYSICAL EXAM:  GEN: No acute distress, hoarseness of voice  LUNGS: Clear to auscultation bilaterally   HEART: S1/S2 present. RRR.   ABD: Soft, non-tender, non-distended. Bowel sounds present  EXT: NC/NC/NE/2+PP/HUNTER  NEURO: AAOX3 SUBJECTIVE:    Patient is a 47y old Female who presents with a chief complaint of Fatigue and generalized weakness (08 May 2018 05:46)    Currently admitted to medicine with the primary diagnosis of Thrush     Today is hospital day 10d. This morning she is resting comfortably in bed and reports no new issues or overnight events. She continue to have throat pain.     PAST MEDICAL & SURGICAL HISTORY  DVT (deep venous thrombosis)  COPD (chronic obstructive pulmonary disease)  CHF (congestive heart failure)  HTN (hypertension)  RA (rheumatoid arthritis)  Throat cancer  AICD (automatic cardioverter/defibrillator) present  Lupus  History of laryngeal cancer  S/P cholecystectomy  S/P appendectomy    SOCIAL HISTORY:  Negative for smoking/alcohol/drug use.     ALLERGIES:  Avelox (Other)  Plaquenil (Other)  Vantin (Other (Mild))    MEDICATIONS:  STANDING MEDICATIONS  ALBUTerol/ipratropium for Nebulization 3 milliLiter(s) Nebulizer every 6 hours  ALPRAZolam 2 milliGRAM(s) Oral every 8 hours  benzocaine 20% Spray 1 Spray(s) Topical two times a day  docusate sodium 100 milliGRAM(s) Oral three times a day  DULoxetine 60 milliGRAM(s) Oral daily  FIRST- Mouthwash  BLM 5 milliLiter(s) Swish and Swallow every 6 hours  gabapentin 300 milliGRAM(s) Oral two times a day  hydrALAZINE 25 milliGRAM(s) Oral every 12 hours  metoprolol succinate  milliGRAM(s) Oral daily  micafungin IVPB 100 milliGRAM(s) IV Intermittent every 24 hours  micafungin IVPB      morphine ER Tablet 200 milliGRAM(s) Oral three times a day  nystatin    Suspension [id number] Unit(s) Swish and Swallow three times a day  rivaroxaban 20 milliGRAM(s) Oral every 24 hours  senna 2 Tablet(s) Oral at bedtime    PRN MEDICATIONS  HYDROmorphone   Tablet 8 milliGRAM(s) Oral every 6 hours PRN  ondansetron    Tablet 8 milliGRAM(s) Oral every 8 hours PRN    VITALS:   T(F): 97.6  HR: 61  BP: 109/43  RR: 18    LABS:                        12.2   6.01  )-----------( 169      ( 15 May 2018 09:25 )             37.2     05-15    141  |  99  |  10  ----------------------------<  95  4.1   |  32  |  0.8    Ca    9.1      15 May 2018 09:25    Urinalysis Basic - ( 16 May 2018 20:45 )    Color: Dark Yellow / Appearance: Cloudy / S.020 / pH: x  Gluc: x / Ketone: Negative  / Bili: Negative / Urobili: 1.0 mg/dL   Blood: x / Protein: 30 mg/dL / Nitrite: Negative   Leuk Esterase: Small / RBC: x / WBC 6-10 /HPF   Sq Epi: x / Non Sq Epi: Moderate /HPF / Bacteria: x    Culture - Blood (collected 15 May 2018 17:51)  Source: .Blood None  Preliminary Report (17 May 2018 01:01):  No growth to date.    RADIOLOGY:  CT Neck Soft Tissue w/ IV Cont (18)  In comparison to the previous neck CT dated 10/12/2017:    1.  No CT correlate to the patient's right mandibular angle pain.  2.  The previously seen enlarged/hyperenhancing/FDG-avid the left level 1B lymph node is no longer visualized and there are new left submandibular surgical clips. A previously seen left posterior cervical lymph node has decreased in size (1 cm vs 1.3 cm).  3.  The previously seen asymmetric hyperenhancement of the left buccal mucosa overlying the mandibular alveolar ridge is no longer visualized.  4.  Stable prominence of the supraglottic and glottic soft tissues, and stable prominence of the tongue base soft tissues.  5.  Stable 1 cm left thyroid nodule.      PHYSICAL EXAM:  GEN: No acute distress, hoarseness of voice  LUNGS: Clear to auscultation bilaterally   HEART: S1/S2 present. RRR.   ABD: Soft, non-tender, non-distended. Bowel sounds present  EXT: NC/NC/NE/2+PP/HUNTER  NEURO: AAOX3

## 2018-05-17 NOTE — PROGRESS NOTE ADULT - SUBJECTIVE AND OBJECTIVE BOX
ILEANA MCNEIL  47y, Female      OVERNIGHT EVENTS:    symptoms less.    VITALS:  T(F): 97.6, Max: 98.5 (18 @ 13:04)  HR: 61  BP: 109/43  RR: 18Vital Signs Last 24 Hrs  T(C): 36.4 (17 May 2018 04:58), Max: 36.9 (16 May 2018 13:04)  T(F): 97.6 (17 May 2018 04:58), Max: 98.5 (16 May 2018 13:04)  HR: 61 (17 May 2018 04:58) (61 - 67)  BP: 109/43 (17 May 2018 04:58) (109/43 - 151/72)  BP(mean): --  RR: 18 (17 May 2018 04:58) (18 - 18)  SpO2: --    TESTS & MEASUREMENTS:                        12.2   6.01  )-----------( 169      ( 15 May 2018 09:25 )             37.2     05-15    141  |  99  |  10  ----------------------------<  95  4.1   |  32  |  0.8    Ca    9.1      15 May 2018 09:25          Culture - Blood (collected 05-15-18 @ 17:51)  Source: .Blood None  Preliminary Report (18 @ 01:01):    No growth to date.      Urinalysis Basic - ( 16 May 2018 20:45 )    Color: Dark Yellow / Appearance: Cloudy / S.020 / pH: x  Gluc: x / Ketone: Negative  / Bili: Negative / Urobili: 1.0 mg/dL   Blood: x / Protein: 30 mg/dL / Nitrite: Negative   Leuk Esterase: Small / RBC: x / WBC 6-10 /HPF   Sq Epi: x / Non Sq Epi: Moderate /HPF / Bacteria: x          RADIOLOGY & ADDITIONAL TESTS:    ANTIBIOTICS:  micafungin IVPB 100 milliGRAM(s) IV Intermittent every 24 hours  micafungin IVPB      nystatin    Suspension [id number] Unit(s) Swish and Swallow three times a day

## 2018-05-17 NOTE — SWALLOW FEES ASSESSMENT ADULT - SLP PERTINENT HISTORY OF CURRENT PROBLEM
Pt admitted with laryngeal thrush, now resolved per EGD note. Pt with h/o laryngeal ca, surgery x4. Recent biopsy of lesion on VC, negative per ENT attending.

## 2018-05-17 NOTE — PROGRESS NOTE ADULT - ASSESSMENT
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  Non infectious causes in differential diagnosis  HIV is negative.    RECOMMENDATIONS:  D/C micafungin.  Po Diflucan 200 mg q24 for10 more days.  Continue with oral Nystatin.  Recall prn please.

## 2018-05-18 ENCOUNTER — APPOINTMENT (OUTPATIENT)
Dept: OTOLARYNGOLOGY | Facility: CLINIC | Age: 48
End: 2018-05-18

## 2018-05-18 ENCOUNTER — TRANSCRIPTION ENCOUNTER (OUTPATIENT)
Age: 48
End: 2018-05-18

## 2018-05-18 VITALS — WEIGHT: 166.23 LBS

## 2018-05-18 LAB
ANION GAP SERPL CALC-SCNC: 12 MMOL/L — SIGNIFICANT CHANGE UP (ref 7–14)
APTT BLD: 38.4 SEC — SIGNIFICANT CHANGE UP (ref 27–39.2)
BASOPHILS # BLD AUTO: 0.04 K/UL — SIGNIFICANT CHANGE UP (ref 0–0.2)
BASOPHILS NFR BLD AUTO: 0.5 % — SIGNIFICANT CHANGE UP (ref 0–1)
BUN SERPL-MCNC: 11 MG/DL — SIGNIFICANT CHANGE UP (ref 10–20)
CALCIUM SERPL-MCNC: 9.4 MG/DL — SIGNIFICANT CHANGE UP (ref 8.5–10.1)
CHLORIDE SERPL-SCNC: 95 MMOL/L — LOW (ref 98–110)
CO2 SERPL-SCNC: 29 MMOL/L — SIGNIFICANT CHANGE UP (ref 17–32)
CREAT SERPL-MCNC: 0.9 MG/DL — SIGNIFICANT CHANGE UP (ref 0.7–1.5)
EOSINOPHIL # BLD AUTO: 0.26 K/UL — SIGNIFICANT CHANGE UP (ref 0–0.7)
EOSINOPHIL NFR BLD AUTO: 3.1 % — SIGNIFICANT CHANGE UP (ref 0–8)
GLUCOSE SERPL-MCNC: 113 MG/DL — HIGH (ref 70–99)
HCT VFR BLD CALC: 40.8 % — SIGNIFICANT CHANGE UP (ref 37–47)
HGB BLD-MCNC: 13.3 G/DL — SIGNIFICANT CHANGE UP (ref 12–16)
IMM GRANULOCYTES NFR BLD AUTO: 0.4 % — HIGH (ref 0.1–0.3)
INR BLD: 1.05 RATIO — SIGNIFICANT CHANGE UP (ref 0.65–1.3)
LYMPHOCYTES # BLD AUTO: 2.19 K/UL — SIGNIFICANT CHANGE UP (ref 1.2–3.4)
LYMPHOCYTES # BLD AUTO: 26.4 % — SIGNIFICANT CHANGE UP (ref 20.5–51.1)
MCHC RBC-ENTMCNC: 28.1 PG — SIGNIFICANT CHANGE UP (ref 27–31)
MCHC RBC-ENTMCNC: 32.6 G/DL — SIGNIFICANT CHANGE UP (ref 32–37)
MCV RBC AUTO: 86.1 FL — SIGNIFICANT CHANGE UP (ref 81–99)
MONOCYTES # BLD AUTO: 0.63 K/UL — HIGH (ref 0.1–0.6)
MONOCYTES NFR BLD AUTO: 7.6 % — SIGNIFICANT CHANGE UP (ref 1.7–9.3)
NEUTROPHILS # BLD AUTO: 5.13 K/UL — SIGNIFICANT CHANGE UP (ref 1.4–6.5)
NEUTROPHILS NFR BLD AUTO: 62 % — SIGNIFICANT CHANGE UP (ref 42.2–75.2)
NRBC # BLD: 0 /100 WBCS — SIGNIFICANT CHANGE UP (ref 0–0)
PLATELET # BLD AUTO: 186 K/UL — SIGNIFICANT CHANGE UP (ref 130–400)
POTASSIUM SERPL-MCNC: 5.2 MMOL/L — HIGH (ref 3.5–5)
POTASSIUM SERPL-SCNC: 5.2 MMOL/L — HIGH (ref 3.5–5)
PROTHROM AB SERPL-ACNC: 11.4 SEC — SIGNIFICANT CHANGE UP (ref 9.95–12.87)
RBC # BLD: 4.74 M/UL — SIGNIFICANT CHANGE UP (ref 4.2–5.4)
RBC # FLD: 13.5 % — SIGNIFICANT CHANGE UP (ref 11.5–14.5)
SODIUM SERPL-SCNC: 136 MMOL/L — SIGNIFICANT CHANGE UP (ref 135–146)
WBC # BLD: 8.28 K/UL — SIGNIFICANT CHANGE UP (ref 4.8–10.8)
WBC # FLD AUTO: 8.28 K/UL — SIGNIFICANT CHANGE UP (ref 4.8–10.8)

## 2018-05-18 RX ORDER — ALPRAZOLAM 0.25 MG
1 TABLET ORAL
Qty: 0 | Refills: 0 | COMMUNITY
Start: 2018-05-18

## 2018-05-18 RX ORDER — DIPHENHYDRAMINE HYDROCHLORIDE AND LIDOCAINE HYDROCHLORIDE AND ALUMINUM HYDROXIDE AND MAGNESIUM HYDRO
10 KIT
Qty: 237 | Refills: 1 | OUTPATIENT
Start: 2018-05-18 | End: 2018-06-14

## 2018-05-18 RX ORDER — FLUCONAZOLE 150 MG/1
1 TABLET ORAL
Qty: 9 | Refills: 0 | OUTPATIENT
Start: 2018-05-18 | End: 2018-05-26

## 2018-05-18 RX ORDER — FUROSEMIDE 40 MG
40 TABLET ORAL ONCE
Qty: 0 | Refills: 0 | Status: COMPLETED | OUTPATIENT
Start: 2018-05-18 | End: 2018-05-18

## 2018-05-18 RX ORDER — ALPRAZOLAM 0.25 MG
2 TABLET ORAL ONCE
Qty: 0 | Refills: 0 | Status: DISCONTINUED | OUTPATIENT
Start: 2018-05-18 | End: 2018-05-18

## 2018-05-18 RX ORDER — ALPRAZOLAM 0.25 MG
0 TABLET ORAL
Qty: 0 | Refills: 0 | COMMUNITY

## 2018-05-18 RX ORDER — NYSTATIN 500MM UNIT
5 POWDER (EA) MISCELLANEOUS
Qty: 475 | Refills: 0 | OUTPATIENT
Start: 2018-05-18 | End: 2018-05-24

## 2018-05-18 RX ADMIN — Medication 100 MILLIGRAM(S): at 05:27

## 2018-05-18 RX ADMIN — MORPHINE SULFATE 200 MILLIGRAM(S): 50 CAPSULE, EXTENDED RELEASE ORAL at 14:40

## 2018-05-18 RX ADMIN — DULOXETINE HYDROCHLORIDE 60 MILLIGRAM(S): 30 CAPSULE, DELAYED RELEASE ORAL at 11:16

## 2018-05-18 RX ADMIN — MORPHINE SULFATE 200 MILLIGRAM(S): 50 CAPSULE, EXTENDED RELEASE ORAL at 05:37

## 2018-05-18 RX ADMIN — RIVAROXABAN 20 MILLIGRAM(S): KIT at 15:21

## 2018-05-18 RX ADMIN — Medication 25 MILLIGRAM(S): at 05:27

## 2018-05-18 RX ADMIN — DIPHENHYDRAMINE HYDROCHLORIDE AND LIDOCAINE HYDROCHLORIDE AND ALUMINUM HYDROXIDE AND MAGNESIUM HYDRO 5 MILLILITER(S): KIT at 05:37

## 2018-05-18 RX ADMIN — Medication 2 MILLIGRAM(S): at 15:20

## 2018-05-18 RX ADMIN — GABAPENTIN 300 MILLIGRAM(S): 400 CAPSULE ORAL at 05:27

## 2018-05-18 RX ADMIN — Medication 1 SPRAY(S): at 05:37

## 2018-05-18 RX ADMIN — MORPHINE SULFATE 200 MILLIGRAM(S): 50 CAPSULE, EXTENDED RELEASE ORAL at 05:27

## 2018-05-18 RX ADMIN — Medication 40 MILLIGRAM(S): at 13:25

## 2018-05-18 RX ADMIN — Medication 100 MILLIGRAM(S): at 15:22

## 2018-05-18 RX ADMIN — Medication 500000 UNIT(S): at 15:22

## 2018-05-18 RX ADMIN — FLUCONAZOLE 200 MILLIGRAM(S): 150 TABLET ORAL at 11:15

## 2018-05-18 RX ADMIN — Medication 500000 UNIT(S): at 05:37

## 2018-05-18 NOTE — DISCHARGE NOTE ADULT - CARE PROVIDERS DIRECT ADDRESSES
[email or web address],DirectAddress_Unknown,[email or web address],DirectAddress_Unknown

## 2018-05-18 NOTE — DISCHARGE NOTE ADULT - CARE PROVIDER_API CALL
Buddy Sultana), Hematology; Internal Medicine; Medical Oncology  [address] [address]  Phone: [phone]  Fax: [phone]    Jason Lamas (DO), Gastroenterology  [address] [place] [address]  Phone: [phone]  Fax: [phone]    Power Hanna (MD), Surgical Physicians  [address] [place] [address]  Phone: [phone]  Fax: [phone]    Teo Novak (MD), Infectious Disease; Internal Medicine  [address] [place] [address]  Phone: [phone]  Fax: [phone]

## 2018-05-18 NOTE — DISCHARGE NOTE ADULT - HOSPITAL COURSE
47y old Female with PMH of CHF s/p AICD, DVT on Xarelto COPD, RA, SLE, laryngeal cancer s/p excision in November, s/p 2 week history of esophageal thrush with completed course of PO diflucan treatment, presented for worsening fatigue and generalized weakness and odynophagia. Pt was scoped at bedside and was admitted to medicine for management of persistent thrush. Pt was seen by ENT, ID, Heme/Onc, and GI. Pt was given IV micafungin and nystatin swish & swallow. Pt responded well to treatment. EGD done 5/15/18 by GI Dr. Lamas showed resolution of thrush, and gastritis and distal esophagitis. Pathology of esophageal scrapings showed no malignant cells and no fungal organisms. CT neck with contrast showed no change or hyper-enhancement of previously existing lymph nodes. Pt is now vitally stable, tolerating mechanical soft & thin fluids diet (her baseline), and self-ambulating. Pt to be discharged home with PO Diflucan 200mg q24 x 8 days and nystatin swish and swallow TID. Pt to follow-up outpatient with PMD and oncologist Dr. Sultana.

## 2018-05-18 NOTE — PROGRESS NOTE ADULT - SUBJECTIVE AND OBJECTIVE BOX
SUBJECTIVE:    Patient is a 47y old Female who presents with a chief complaint of Fatigue and generalized weakness (08 May 2018 05:46)    Currently admitted to medicine with the primary diagnosis of Thrush     Today is hospital day 11d. This morning she is resting comfortably in bed and reports no new issues or overnight events.     PAST MEDICAL & SURGICAL HISTORY  DVT (deep venous thrombosis)  COPD (chronic obstructive pulmonary disease)  CHF (congestive heart failure)  HTN (hypertension)  RA (rheumatoid arthritis)  Throat cancer  AICD (automatic cardioverter/defibrillator) present  Lupus  History of laryngeal cancer  S/P cholecystectomy  S/P appendectomy    SOCIAL HISTORY:  Negative for smoking/alcohol/drug use.     ALLERGIES:  Avelox (Other)  Plaquenil (Other)  Vantin (Other (Mild))    MEDICATIONS:  STANDING MEDICATIONS  ALBUTerol/ipratropium for Nebulization 3 milliLiter(s) Nebulizer every 6 hours  benzocaine 20% Spray 1 Spray(s) Topical two times a day  docusate sodium 100 milliGRAM(s) Oral three times a day  DULoxetine 60 milliGRAM(s) Oral daily  FIRST- Mouthwash  BLM 5 milliLiter(s) Swish and Swallow every 6 hours  fluconAZOLE   Tablet 200 milliGRAM(s) Oral daily  gabapentin 300 milliGRAM(s) Oral two times a day  hydrALAZINE 25 milliGRAM(s) Oral every 12 hours  metoprolol succinate  milliGRAM(s) Oral daily  morphine ER Tablet 200 milliGRAM(s) Oral three times a day  nystatin    Suspension [id number] Unit(s) Swish and Swallow three times a day  rivaroxaban 20 milliGRAM(s) Oral every 24 hours  senna 2 Tablet(s) Oral at bedtime    PRN MEDICATIONS  HYDROmorphone   Tablet 8 milliGRAM(s) Oral every 6 hours PRN  ondansetron    Tablet 8 milliGRAM(s) Oral every 8 hours PRN    VITALS:   T(F): 97.9  HR: 76  BP: 122/71  RR: 18      Urinalysis Basic - ( 16 May 2018 20:45 )    Color: Dark Yellow / Appearance: Cloudy / S.020 / pH: x  Gluc: x / Ketone: Negative  / Bili: Negative / Urobili: 1.0 mg/dL   Blood: x / Protein: 30 mg/dL / Nitrite: Negative   Leuk Esterase: Small / RBC: x / WBC 6-10 /HPF   Sq Epi: x / Non Sq Epi: Moderate /HPF / Bacteria: x    Culture - Blood (collected 15 May 2018 17:51)  Source: .Blood None  Preliminary Report (17 May 2018 01:01):    No growth to date.      RADIOLOGY:  CT Neck Soft Tissue w/ IV Cont (05)  In comparison to the previous neck CT dated 10/12/2017:    1.  No CT correlate to the patient's right mandibular angle pain.  2.  The previously seen enlarged/hyperenhancing/FDG-avid the left level 1B lymph node is no longer visualized and there are new left submandibular surgical clips. A previously seen left posterior cervical lymph node has decreased in size (1 cm vs 1.3 cm).  3.  The previously seen asymmetric hyperenhancement of the left buccal mucosa overlying the mandibular alveolar ridge is no longer visualized.  4.  Stable prominence of the supraglottic and glottic soft tissues, and stable prominence of the tongue base soft tissues.  5.  Stable 1 cm left thyroid nodule.      PHYSICAL EXAM:  GEN: No acute distress, hoarseness of voice  LUNGS: Clear to auscultation bilaterally   HEART: S1/S2 present. RRR.   ABD: Soft, non-tender, non-distended. Bowel sounds present  EXT: NC/NC/NE/2+PP/HUNTER  NEURO: AAOX3 SUBJECTIVE:    Patient is a 47y old Female who presents with a chief complaint of Fatigue and generalized weakness (08 May 2018 05:46)    Currently admitted to medicine with the primary diagnosis of Thrush     Today is hospital day 11d. This morning she is resting comfortably in bed and reports adverse events overnight.   Patient woke up with mouth bleeding and one episode of vomiting and passed small blood clots.    PAST MEDICAL & SURGICAL HISTORY  DVT (deep venous thrombosis)  COPD (chronic obstructive pulmonary disease)  CHF (congestive heart failure)  HTN (hypertension)  RA (rheumatoid arthritis)  Throat cancer  AICD (automatic cardioverter/defibrillator) present  Lupus  History of laryngeal cancer  S/P cholecystectomy  S/P appendectomy    SOCIAL HISTORY:  Negative for smoking/alcohol/drug use.     ALLERGIES:  Avelox (Other)  Plaquenil (Other)  Vantin (Other (Mild))    MEDICATIONS:  STANDING MEDICATIONS  ALBUTerol/ipratropium for Nebulization 3 milliLiter(s) Nebulizer every 6 hours  benzocaine 20% Spray 1 Spray(s) Topical two times a day  docusate sodium 100 milliGRAM(s) Oral three times a day  DULoxetine 60 milliGRAM(s) Oral daily  FIRST- Mouthwash  BLM 5 milliLiter(s) Swish and Swallow every 6 hours  fluconAZOLE   Tablet 200 milliGRAM(s) Oral daily  gabapentin 300 milliGRAM(s) Oral two times a day  hydrALAZINE 25 milliGRAM(s) Oral every 12 hours  metoprolol succinate  milliGRAM(s) Oral daily  morphine ER Tablet 200 milliGRAM(s) Oral three times a day  nystatin    Suspension [id number] Unit(s) Swish and Swallow three times a day  rivaroxaban 20 milliGRAM(s) Oral every 24 hours  senna 2 Tablet(s) Oral at bedtime    PRN MEDICATIONS  HYDROmorphone   Tablet 8 milliGRAM(s) Oral every 6 hours PRN  ondansetron    Tablet 8 milliGRAM(s) Oral every 8 hours PRN    VITALS:   T(F): 97.9  HR: 76  BP: 122/71  RR: 18    Urinalysis Basic - ( 16 May 2018 20:45 )    Color: Dark Yellow / Appearance: Cloudy / S.020 / pH: x  Gluc: x / Ketone: Negative  / Bili: Negative / Urobili: 1.0 mg/dL   Blood: x / Protein: 30 mg/dL / Nitrite: Negative   Leuk Esterase: Small / RBC: x / WBC 6-10 /HPF   Sq Epi: x / Non Sq Epi: Moderate /HPF / Bacteria: x    Culture - Blood (collected 15 May 2018 17:51)  Source: .Blood None  Preliminary Report (17 May 2018 01:01):    No growth to date.      RADIOLOGY:  CT Neck Soft Tissue w/ IV Cont (18)  In comparison to the previous neck CT dated 10/12/2017:    1.  No CT correlate to the patient's right mandibular angle pain.  2.  The previously seen enlarged/hyperenhancing/FDG-avid the left level 1B lymph node is no longer visualized and there are new left submandibular surgical clips. A previously seen left posterior cervical lymph node has decreased in size (1 cm vs 1.3 cm).  3.  The previously seen asymmetric hyperenhancement of the left buccal mucosa overlying the mandibular alveolar ridge is no longer visualized.  4.  Stable prominence of the supraglottic and glottic soft tissues, and stable prominence of the tongue base soft tissues.  5.  Stable 1 cm left thyroid nodule.      PHYSICAL EXAM:  GEN: No acute distress, hoarseness of voice  HEENT: multiple petechiae are visible on the soft palate  LUNGS: Clear to auscultation bilaterally   HEART: S1/S2 present. RRR.   ABD: Soft, non-tender, non-distended. Bowel sounds present  EXT: NC/NC/NE/2+PP/HUNTER  NEURO: AAOX3

## 2018-05-18 NOTE — DISCHARGE NOTE ADULT - PLAN OF CARE
Medical management - continue metoprolol and hydralazine  - monitor blood pressure - s/p excision in Nov 2017; last chemo Jan 2018  - Onc consult appreciated: outpt follow up  - CT of neck with contrast:   	1.  No CT findings correlate to the patient's right mandibular angle pain.  	2.  The previously seen enlarged/hyperenhancing/FDG-avid the left level 1B lymph node is no longer visualized and there are new left submandibular surgical clips. A previously seen left posterior cervical lymph node has decreased in size (1 cm vs 1.3 cm). - continue xarelto - continue nystatin swish & swallow TID, Hurricaine q6H.   - continue diflucan 200mg q24 (till 05/27)  - Repeat blood cultures (05/15): negative  - GI consult (Dr. Lamas): s/p EGD 05/15/18  - Esophageal brushing: negative for malignant cells and fungal organisms  - follow up with outpt ID

## 2018-05-18 NOTE — DISCHARGE NOTE ADULT - CARE PLAN
Principal Discharge DX:	Thrush  Goal:	Medical management  Assessment and plan of treatment:	- continue nystatin swish & swallow TID, Hurricaine q6H.   - continue diflucan 200mg q24 (till 05/27)  - Repeat blood cultures (05/15): negative  - GI consult (Dr. Lamas): s/p EGD 05/15/18  - Esophageal brushing: negative for malignant cells and fungal organisms  - follow up with outpt ID  Secondary Diagnosis:	HTN (hypertension)  Goal:	Medical management  Assessment and plan of treatment:	- continue metoprolol and hydralazine  - monitor blood pressure  Secondary Diagnosis:	Throat cancer  Goal:	Medical management  Assessment and plan of treatment:	- s/p excision in Nov 2017; last chemo Jan 2018  - Onc consult appreciated: outpt follow up  - CT of neck with contrast:   	1.  No CT findings correlate to the patient's right mandibular angle pain.  	2.  The previously seen enlarged/hyperenhancing/FDG-avid the left level 1B lymph node is no longer visualized and there are new left submandibular surgical clips. A previously seen left posterior cervical lymph node has decreased in size (1 cm vs 1.3 cm).  Secondary Diagnosis:	DVT (deep venous thrombosis)  Goal:	Medical management  Assessment and plan of treatment:	- continue xarelto

## 2018-05-18 NOTE — DISCHARGE NOTE ADULT - ADDITIONAL INSTRUCTIONS
please take your medications as directed  please follow up with outpt  ENT, ID, Oncology  Follow up with pmd within one week for further management of your symptoms  Follow up with your pain management to wean off opioids

## 2018-05-18 NOTE — PROGRESS NOTE ADULT - ASSESSMENT
47y old Female  with significant PMH of CHF s/p AICD, DVT on xarelto, COPD, RA, laryngeal cancer s/p excision in november, s/p  2 week history of esophageal thrush 2 weeks ago status post PO diflucan treatment, presented for worsening fatigue and generalized weakness and odynophagia. Admitted for laryngeal thrush    #) Laryngeal Candidiasis  - ENT following: pt scoped at bedside, net improvement in pharyngolaryngeal thrush  - continue nystatin swish & swallow TID, Hurricaine q6H.   - continue diflucan 200mg q24 (till 05/27); Micafungin d/kalyan 05/17/18  - Repeat blood cultures (05/15): negative  - GI consult (Dr. Lamas): s/p EGD 05/15/18, esophageal brushing discarded by lab as it was collected in the wrong bottle  - pain control    #) Hypertension  - continue metoprolol and hydralazine  - monitor blood pressure    #) Throat cancer   - s/p excision in Nov 2017; last chemo Jan 2018  - Onc consult appreciated  - CT of neck with contrast:   	1.  No CT findings correlate to the patient's right mandibular angle pain.  	2.  The previously seen enlarged/hyperenhancing/FDG-avid the left level 1B lymph node is no longer visualized and there are new left submandibular surgical clips. A previously seen left posterior cervical lymph node has decreased in size (1 cm vs 1.3 cm).    #) H/o DVT  - continue xarelto    #) Disposition  - discharge today to home  - need to f/u with ONC, PMD, Pain Management.     #) Full code status 47y old Female  with significant PMH of CHF s/p AICD, DVT on xarelto, COPD, RA, laryngeal cancer s/p excision in november, s/p  2 week history of esophageal thrush 2 weeks ago status post PO diflucan treatment, presented for worsening fatigue and generalized weakness and odynophagia. Admitted for laryngeal thrush    #) Laryngeal Candidiasis  - ENT following: pt scoped at bedside, net improvement in pharyngolaryngeal thrush  - continue nystatin swish & swallow TID, Hurricaine q6H.   - continue diflucan 200mg q24 (till 05/27); Micafungin d/kalyan 05/17/18  - Repeat blood cultures (05/15): negative  - GI consult (Dr. Lamas): s/p EGD 05/15/18  - Esophageal brushing: negative for malignant cells and fungal organisms    #) Petechiae of mouth  - patient started xarelto yesterday  - will monitor cbc  - no complains of bleed from other orifices      #) Hypertension  - continue metoprolol and hydralazine  - monitor blood pressure    #) Throat cancer   - s/p excision in Nov 2017; last chemo Jan 2018  - Onc consult appreciated  - CT of neck with contrast:   	1.  No CT findings correlate to the patient's right mandibular angle pain.  	2.  The previously seen enlarged/hyperenhancing/FDG-avid the left level 1B lymph node is no longer visualized and there are new left submandibular surgical clips. A previously seen left posterior cervical lymph node has decreased in size (1 cm vs 1.3 cm).    #) H/o DVT  - continue xarelto    #) Disposition  - discharge today to home  - need to f/u with ONC, PMD, Pain Management.     #) Full code status 47y old Female  with significant PMH of CHF s/p AICD, DVT on xarelto, COPD, RA, laryngeal cancer s/p excision in november, s/p  2 week history of esophageal thrush 2 weeks ago status post PO diflucan treatment, presented for worsening fatigue and generalized weakness and odynophagia. Admitted for laryngeal thrush    #) Laryngeal Candidiasis  - ENT following: pt scoped at bedside, net improvement in pharyngolaryngeal thrush  - continue nystatin swish & swallow TID, Hurricaine q6H.   - continue diflucan 200mg q24 (till 05/27); Micafungin d/kalyan 05/17/18  - Repeat blood cultures (05/15): negative  - GI consult (Dr. Lamas): s/p EGD 05/15/18  - Esophageal brushing: negative for malignant cells and fungal organisms    #) Petechiae of mouth  - patient started xarelto yesterday  - will monitor cbc  - no complains of bleed from other orifices      #) Hypertension  - continue metoprolol and hydralazine  - monitor blood pressure    #) Throat cancer   - s/p excision in Nov 2017; last chemo Jan 2018  - Onc consult appreciated  - CT of neck with contrast:   	1.  No CT findings correlate to the patient's right mandibular angle pain.  	2.  The previously seen enlarged/hyperenhancing/FDG-avid the left level 1B lymph node is no longer visualized and there are new left submandibular surgical clips. A previously seen left posterior cervical lymph node has decreased in size (1 cm vs 1.3 cm).    #) H/o DVT  - continue xarelto    #) Disposition  - discharge today to home  - need to f/u with ONC, PMD, Pain Management.     #) Full code status    Attending Attestation:  Pt seen and examined. Case/Plan discussed at rounds and with patient. Agree with Intern documentation as corrected above.   Dispo: d/c home today. f/u cbc sent this morning. Pt with minor soft pallet patechiae but risk of thrombosis (h/x dvt/pe) is high given h/x Laryngeal CA hence must be on AC. Continue Diflucan and Nystatin swish and swallow. f/u with ENT, ID, Oncology, PMD. D.c home today.

## 2018-05-18 NOTE — DISCHARGE NOTE ADULT - MEDICATION SUMMARY - MEDICATIONS TO TAKE
I will START or STAY ON the medications listed below when I get home from the hospital:    MS Contin 100 mg oral tablet, extended release  -- Indication: For Throat cancer    Dilaudid 4 mg oral tablet  -- Indication: For Throat cancer    Xarelto 20 mg oral tablet  -- 1 tab(s) by mouth once a day (in the evening)  -- Indication: For DVT (deep venous thrombosis)    gabapentin 300 mg oral tablet  -- Indication: For chronic pain     traZODone  -- Indication: For Depression     Reglan  -- Indication: For COnstipation     fluconazole 200 mg oral tablet  -- 1 tab(s) by mouth once a day  -- Indication: For oral candidiasis    nystatin 100,000 units/mL oral suspension  -- 5 milliliter(s) by mouth 3 times a day   -- Indication: For oral candidiasis    methotrexate  -- Indication: For RA (rheumatoid arthritis)    Vistaril 50 mg oral capsule  -- Indication: For HTN (hypertension)    ALPRAZolam 2 mg oral tablet  -- 1 tab(s) by mouth 3 times a day  -- Indication: For Anxiety    Toprol-XL 50 mg oral tablet, extended release  -- Indication: For HTN (hypertension)    mycophenolate mofetil  -- Indication: For RA (rheumatoid arthritis)    Benlysta 200 mg/mL subcutaneous solution  -- Indication: For RA (rheumatoid arthritis)    diphenhydrAMINE/lidocaine/aluminum hydroxide/magnesium hydroxide/simethicone mucous membrane suspension  -- 10 milliliter(s) mucous membrane 4 times a day   -- Indication: For oralgia    Prevacid 30 mg oral delayed release capsule  -- Indication: For gastritis    hydrALAZINE 50 mg oral tablet  -- Indication: For Hypertension    folic acid  -- Indication: For Suppliment

## 2018-05-18 NOTE — DISCHARGE NOTE ADULT - PATIENT PORTAL LINK FT
You can access the [email or web address], offered by Health system, by registering with the following website: [email or web address]

## 2018-05-21 LAB
CULTURE RESULTS: SIGNIFICANT CHANGE UP
SPECIMEN SOURCE: SIGNIFICANT CHANGE UP

## 2018-05-24 DIAGNOSIS — Z86.718 PERSONAL HISTORY OF OTHER VENOUS THROMBOSIS AND EMBOLISM: ICD-10-CM

## 2018-05-24 DIAGNOSIS — R07.0 PAIN IN THROAT: ICD-10-CM

## 2018-05-24 DIAGNOSIS — I50.9 HEART FAILURE, UNSPECIFIED: ICD-10-CM

## 2018-05-24 DIAGNOSIS — L93.2 OTHER LOCAL LUPUS ERYTHEMATOSUS: ICD-10-CM

## 2018-05-24 DIAGNOSIS — C32.9 MALIGNANT NEOPLASM OF LARYNX, UNSPECIFIED: ICD-10-CM

## 2018-05-24 DIAGNOSIS — R59.0 LOCALIZED ENLARGED LYMPH NODES: ICD-10-CM

## 2018-05-24 DIAGNOSIS — Z95.810 PRESENCE OF AUTOMATIC (IMPLANTABLE) CARDIAC DEFIBRILLATOR: ICD-10-CM

## 2018-05-24 DIAGNOSIS — I11.0 HYPERTENSIVE HEART DISEASE WITH HEART FAILURE: ICD-10-CM

## 2018-05-24 DIAGNOSIS — Z79.01 LONG TERM (CURRENT) USE OF ANTICOAGULANTS: ICD-10-CM

## 2018-05-24 DIAGNOSIS — K20.9 ESOPHAGITIS, UNSPECIFIED: ICD-10-CM

## 2018-05-24 DIAGNOSIS — R13.10 DYSPHAGIA, UNSPECIFIED: ICD-10-CM

## 2018-05-24 DIAGNOSIS — B37.0 CANDIDAL STOMATITIS: ICD-10-CM

## 2018-05-24 DIAGNOSIS — J44.9 CHRONIC OBSTRUCTIVE PULMONARY DISEASE, UNSPECIFIED: ICD-10-CM

## 2018-05-24 DIAGNOSIS — M06.9 RHEUMATOID ARTHRITIS, UNSPECIFIED: ICD-10-CM

## 2018-05-30 ENCOUNTER — APPOINTMENT (OUTPATIENT)
Dept: OTOLARYNGOLOGY | Facility: CLINIC | Age: 48
End: 2018-05-30

## 2018-06-01 ENCOUNTER — APPOINTMENT (OUTPATIENT)
Dept: OTOLARYNGOLOGY | Facility: CLINIC | Age: 48
End: 2018-06-01
Payer: MEDICARE

## 2018-06-01 VITALS
BODY MASS INDEX: 29.23 KG/M2 | DIASTOLIC BLOOD PRESSURE: 87 MMHG | WEIGHT: 165 LBS | HEIGHT: 63 IN | SYSTOLIC BLOOD PRESSURE: 129 MMHG

## 2018-06-01 PROBLEM — I82.409 ACUTE EMBOLISM AND THROMBOSIS OF UNSPECIFIED DEEP VEINS OF UNSPECIFIED LOWER EXTREMITY: Chronic | Status: ACTIVE | Noted: 2018-05-07

## 2018-06-01 PROBLEM — I50.9 HEART FAILURE, UNSPECIFIED: Chronic | Status: ACTIVE | Noted: 2018-05-07

## 2018-06-01 PROBLEM — M06.9 RHEUMATOID ARTHRITIS, UNSPECIFIED: Chronic | Status: ACTIVE | Noted: 2018-05-07

## 2018-06-01 PROBLEM — L93.0 DISCOID LUPUS ERYTHEMATOSUS: Chronic | Status: ACTIVE | Noted: 2018-05-07

## 2018-06-01 PROBLEM — I10 ESSENTIAL (PRIMARY) HYPERTENSION: Chronic | Status: ACTIVE | Noted: 2018-05-07

## 2018-06-01 PROBLEM — J44.9 CHRONIC OBSTRUCTIVE PULMONARY DISEASE, UNSPECIFIED: Chronic | Status: ACTIVE | Noted: 2018-05-07

## 2018-06-01 PROCEDURE — 99215 OFFICE O/P EST HI 40 MIN: CPT | Mod: 25

## 2018-06-01 PROCEDURE — 31575 DIAGNOSTIC LARYNGOSCOPY: CPT

## 2018-06-04 ENCOUNTER — INPATIENT (INPATIENT)
Facility: HOSPITAL | Age: 48
LOS: 21 days | Discharge: HOME | End: 2018-06-26
Attending: HOSPITALIST | Admitting: HOSPITALIST
Payer: MEDICARE

## 2018-06-04 VITALS
OXYGEN SATURATION: 96 % | HEART RATE: 114 BPM | SYSTOLIC BLOOD PRESSURE: 116 MMHG | RESPIRATION RATE: 20 BRPM | DIASTOLIC BLOOD PRESSURE: 77 MMHG | TEMPERATURE: 98 F

## 2018-06-04 DIAGNOSIS — Z90.49 ACQUIRED ABSENCE OF OTHER SPECIFIED PARTS OF DIGESTIVE TRACT: Chronic | ICD-10-CM

## 2018-06-04 DIAGNOSIS — Z85.21 PERSONAL HISTORY OF MALIGNANT NEOPLASM OF LARYNX: Chronic | ICD-10-CM

## 2018-06-04 LAB
ALBUMIN SERPL ELPH-MCNC: 4.2 G/DL — SIGNIFICANT CHANGE UP (ref 3.5–5.2)
ALP SERPL-CCNC: 74 U/L — SIGNIFICANT CHANGE UP (ref 30–115)
ALT FLD-CCNC: 9 U/L — SIGNIFICANT CHANGE UP (ref 0–41)
ANION GAP SERPL CALC-SCNC: 12 MMOL/L — SIGNIFICANT CHANGE UP (ref 7–14)
AST SERPL-CCNC: 27 U/L — SIGNIFICANT CHANGE UP (ref 0–41)
BASOPHILS # BLD AUTO: 0.03 K/UL — SIGNIFICANT CHANGE UP (ref 0–0.2)
BASOPHILS NFR BLD AUTO: 0.4 % — SIGNIFICANT CHANGE UP (ref 0–1)
BILIRUB SERPL-MCNC: 0.3 MG/DL — SIGNIFICANT CHANGE UP (ref 0.2–1.2)
BUN SERPL-MCNC: 12 MG/DL — SIGNIFICANT CHANGE UP (ref 10–20)
CALCIUM SERPL-MCNC: 9.1 MG/DL — SIGNIFICANT CHANGE UP (ref 8.5–10.1)
CHLORIDE SERPL-SCNC: 97 MMOL/L — LOW (ref 98–110)
CO2 SERPL-SCNC: 28 MMOL/L — SIGNIFICANT CHANGE UP (ref 17–32)
CREAT SERPL-MCNC: 1 MG/DL — SIGNIFICANT CHANGE UP (ref 0.7–1.5)
EOSINOPHIL # BLD AUTO: 0.15 K/UL — SIGNIFICANT CHANGE UP (ref 0–0.7)
EOSINOPHIL NFR BLD AUTO: 1.8 % — SIGNIFICANT CHANGE UP (ref 0–8)
GLUCOSE SERPL-MCNC: 86 MG/DL — SIGNIFICANT CHANGE UP (ref 70–99)
HCT VFR BLD CALC: 41.6 % — SIGNIFICANT CHANGE UP (ref 37–47)
HGB BLD-MCNC: 13.5 G/DL — SIGNIFICANT CHANGE UP (ref 12–16)
IMM GRANULOCYTES NFR BLD AUTO: 0.7 % — HIGH (ref 0.1–0.3)
LACTATE SERPL-SCNC: 0.7 MMOL/L — SIGNIFICANT CHANGE UP (ref 0.5–2.2)
LYMPHOCYTES # BLD AUTO: 2.23 K/UL — SIGNIFICANT CHANGE UP (ref 1.2–3.4)
LYMPHOCYTES # BLD AUTO: 26.9 % — SIGNIFICANT CHANGE UP (ref 20.5–51.1)
MCHC RBC-ENTMCNC: 27.8 PG — SIGNIFICANT CHANGE UP (ref 27–31)
MCHC RBC-ENTMCNC: 32.5 G/DL — SIGNIFICANT CHANGE UP (ref 32–37)
MCV RBC AUTO: 85.6 FL — SIGNIFICANT CHANGE UP (ref 81–99)
MONOCYTES # BLD AUTO: 0.57 K/UL — SIGNIFICANT CHANGE UP (ref 0.1–0.6)
MONOCYTES NFR BLD AUTO: 6.9 % — SIGNIFICANT CHANGE UP (ref 1.7–9.3)
NEUTROPHILS # BLD AUTO: 5.26 K/UL — SIGNIFICANT CHANGE UP (ref 1.4–6.5)
NEUTROPHILS NFR BLD AUTO: 63.3 % — SIGNIFICANT CHANGE UP (ref 42.2–75.2)
PLATELET # BLD AUTO: 205 K/UL — SIGNIFICANT CHANGE UP (ref 130–400)
POTASSIUM SERPL-MCNC: 6.3 MMOL/L — CRITICAL HIGH (ref 3.5–5)
POTASSIUM SERPL-SCNC: 6.3 MMOL/L — CRITICAL HIGH (ref 3.5–5)
PROT SERPL-MCNC: 7.5 G/DL — SIGNIFICANT CHANGE UP (ref 6–8)
RBC # BLD: 4.86 M/UL — SIGNIFICANT CHANGE UP (ref 4.2–5.4)
RBC # FLD: 13.3 % — SIGNIFICANT CHANGE UP (ref 11.5–14.5)
SODIUM SERPL-SCNC: 137 MMOL/L — SIGNIFICANT CHANGE UP (ref 135–146)
WBC # BLD: 8.3 K/UL — SIGNIFICANT CHANGE UP (ref 4.8–10.8)
WBC # FLD AUTO: 8.3 K/UL — SIGNIFICANT CHANGE UP (ref 4.8–10.8)

## 2018-06-04 RX ORDER — MORPHINE SULFATE 50 MG/1
0 CAPSULE, EXTENDED RELEASE ORAL
Qty: 0 | Refills: 0 | COMMUNITY

## 2018-06-04 RX ORDER — HYDROMORPHONE HYDROCHLORIDE 2 MG/ML
0 INJECTION INTRAMUSCULAR; INTRAVENOUS; SUBCUTANEOUS
Qty: 0 | Refills: 0 | COMMUNITY

## 2018-06-04 RX ORDER — PANTOPRAZOLE SODIUM 20 MG/1
40 TABLET, DELAYED RELEASE ORAL
Qty: 0 | Refills: 0 | Status: DISCONTINUED | OUTPATIENT
Start: 2018-06-04 | End: 2018-06-26

## 2018-06-04 RX ORDER — RIVAROXABAN 15 MG-20MG
1 KIT ORAL
Qty: 0 | Refills: 0 | COMMUNITY

## 2018-06-04 RX ORDER — METOCLOPRAMIDE HCL 10 MG
10 TABLET ORAL
Qty: 0 | Refills: 0 | Status: DISCONTINUED | OUTPATIENT
Start: 2018-06-04 | End: 2018-06-22

## 2018-06-04 RX ORDER — TRAZODONE HCL 50 MG
100 TABLET ORAL AT BEDTIME
Qty: 0 | Refills: 0 | Status: DISCONTINUED | OUTPATIENT
Start: 2018-06-04 | End: 2018-06-26

## 2018-06-04 RX ORDER — GABAPENTIN 400 MG/1
300 CAPSULE ORAL
Qty: 0 | Refills: 0 | Status: DISCONTINUED | OUTPATIENT
Start: 2018-06-04 | End: 2018-06-08

## 2018-06-04 RX ORDER — RIVAROXABAN 15 MG-20MG
20 KIT ORAL EVERY 24 HOURS
Qty: 0 | Refills: 0 | Status: DISCONTINUED | OUTPATIENT
Start: 2018-06-04 | End: 2018-06-08

## 2018-06-04 RX ORDER — LANSOPRAZOLE 15 MG/1
0 CAPSULE, DELAYED RELEASE ORAL
Qty: 0 | Refills: 0 | COMMUNITY

## 2018-06-04 RX ORDER — MYCOPHENOLATE MOFETIL 250 MG/1
0 CAPSULE ORAL
Qty: 0 | Refills: 0 | COMMUNITY

## 2018-06-04 RX ORDER — IPRATROPIUM/ALBUTEROL SULFATE 18-103MCG
3 AEROSOL WITH ADAPTER (GRAM) INHALATION EVERY 4 HOURS
Qty: 0 | Refills: 0 | Status: DISCONTINUED | OUTPATIENT
Start: 2018-06-04 | End: 2018-06-08

## 2018-06-04 RX ORDER — MORPHINE SULFATE 50 MG/1
200 CAPSULE, EXTENDED RELEASE ORAL THREE TIMES A DAY
Qty: 0 | Refills: 0 | Status: DISCONTINUED | OUTPATIENT
Start: 2018-06-04 | End: 2018-06-11

## 2018-06-04 RX ORDER — HYDROMORPHONE HYDROCHLORIDE 2 MG/ML
8 INJECTION INTRAMUSCULAR; INTRAVENOUS; SUBCUTANEOUS EVERY 4 HOURS
Qty: 0 | Refills: 0 | Status: DISCONTINUED | OUTPATIENT
Start: 2018-06-04 | End: 2018-06-11

## 2018-06-04 RX ORDER — TRAZODONE HCL 50 MG
0 TABLET ORAL
Qty: 0 | Refills: 0 | COMMUNITY

## 2018-06-04 RX ORDER — METHOTREXATE 2.5 MG/1
0 TABLET ORAL
Qty: 0 | Refills: 0 | COMMUNITY

## 2018-06-04 RX ORDER — FLUCONAZOLE 150 MG/1
1 TABLET ORAL
Qty: 0 | Refills: 0 | COMMUNITY

## 2018-06-04 RX ORDER — METOPROLOL TARTRATE 50 MG
50 TABLET ORAL DAILY
Qty: 0 | Refills: 0 | Status: DISCONTINUED | OUTPATIENT
Start: 2018-06-04 | End: 2018-06-20

## 2018-06-04 RX ORDER — FOLIC ACID 0.8 MG
1 TABLET ORAL DAILY
Qty: 0 | Refills: 0 | Status: DISCONTINUED | OUTPATIENT
Start: 2018-06-04 | End: 2018-06-26

## 2018-06-04 RX ORDER — FOLIC ACID 0.8 MG
0 TABLET ORAL
Qty: 0 | Refills: 0 | COMMUNITY

## 2018-06-04 RX ORDER — SODIUM CHLORIDE 9 MG/ML
1000 INJECTION, SOLUTION INTRAVENOUS ONCE
Qty: 0 | Refills: 0 | Status: COMPLETED | OUTPATIENT
Start: 2018-06-04 | End: 2018-06-04

## 2018-06-04 RX ORDER — HYDROXYZINE HCL 10 MG
0 TABLET ORAL
Qty: 0 | Refills: 0 | COMMUNITY

## 2018-06-04 RX ORDER — NYSTATIN 500MM UNIT
4 POWDER (EA) MISCELLANEOUS
Qty: 0 | Refills: 0 | COMMUNITY

## 2018-06-04 RX ORDER — ALPRAZOLAM 0.25 MG
1 TABLET ORAL
Qty: 0 | Refills: 0 | Status: COMPLETED | OUTPATIENT
Start: 2018-06-04 | End: 2018-06-11

## 2018-06-04 RX ORDER — HYDROXYZINE HCL 10 MG
50 TABLET ORAL
Qty: 0 | Refills: 0 | Status: DISCONTINUED | OUTPATIENT
Start: 2018-06-04 | End: 2018-06-08

## 2018-06-04 RX ORDER — MICAFUNGIN SODIUM 100 MG/1
100 INJECTION, POWDER, LYOPHILIZED, FOR SOLUTION INTRAVENOUS EVERY 24 HOURS
Qty: 0 | Refills: 0 | Status: DISCONTINUED | OUTPATIENT
Start: 2018-06-05 | End: 2018-06-05

## 2018-06-04 RX ORDER — MICAFUNGIN SODIUM 100 MG/1
100 INJECTION, POWDER, LYOPHILIZED, FOR SOLUTION INTRAVENOUS ONCE
Qty: 0 | Refills: 0 | Status: COMPLETED | OUTPATIENT
Start: 2018-06-04 | End: 2018-06-04

## 2018-06-04 RX ORDER — GABAPENTIN 400 MG/1
0 CAPSULE ORAL
Qty: 0 | Refills: 0 | COMMUNITY

## 2018-06-04 RX ORDER — NYSTATIN 500MM UNIT
500000 POWDER (EA) MISCELLANEOUS
Qty: 0 | Refills: 0 | Status: DISCONTINUED | OUTPATIENT
Start: 2018-06-04 | End: 2018-06-26

## 2018-06-04 RX ORDER — MICAFUNGIN SODIUM 100 MG/1
INJECTION, POWDER, LYOPHILIZED, FOR SOLUTION INTRAVENOUS
Qty: 0 | Refills: 0 | Status: DISCONTINUED | OUTPATIENT
Start: 2018-06-04 | End: 2018-06-05

## 2018-06-04 RX ORDER — BELIMUMAB 200 MG/ML
0 SOLUTION SUBCUTANEOUS
Qty: 0 | Refills: 0 | COMMUNITY

## 2018-06-04 RX ORDER — HYDROMORPHONE HYDROCHLORIDE 2 MG/ML
8 INJECTION INTRAMUSCULAR; INTRAVENOUS; SUBCUTANEOUS EVERY 4 HOURS
Qty: 0 | Refills: 0 | Status: DISCONTINUED | OUTPATIENT
Start: 2018-06-04 | End: 2018-06-04

## 2018-06-04 RX ORDER — HYDRALAZINE HCL 50 MG
50 TABLET ORAL EVERY 8 HOURS
Qty: 0 | Refills: 0 | Status: DISCONTINUED | OUTPATIENT
Start: 2018-06-04 | End: 2018-06-12

## 2018-06-04 RX ORDER — METOCLOPRAMIDE HCL 10 MG
0 TABLET ORAL
Qty: 0 | Refills: 0 | COMMUNITY

## 2018-06-04 RX ORDER — HYDRALAZINE HCL 50 MG
0 TABLET ORAL
Qty: 0 | Refills: 0 | COMMUNITY

## 2018-06-04 RX ORDER — METOPROLOL TARTRATE 50 MG
0 TABLET ORAL
Qty: 0 | Refills: 0 | COMMUNITY

## 2018-06-04 RX ADMIN — MICAFUNGIN SODIUM 105 MILLIGRAM(S): 100 INJECTION, POWDER, LYOPHILIZED, FOR SOLUTION INTRAVENOUS at 20:08

## 2018-06-04 RX ADMIN — SODIUM CHLORIDE 1000 MILLILITER(S): 9 INJECTION, SOLUTION INTRAVENOUS at 17:45

## 2018-06-04 RX ADMIN — HYDROMORPHONE HYDROCHLORIDE 8 MILLIGRAM(S): 2 INJECTION INTRAMUSCULAR; INTRAVENOUS; SUBCUTANEOUS at 22:35

## 2018-06-04 RX ADMIN — Medication 50 MILLIGRAM(S): at 23:33

## 2018-06-04 RX ADMIN — MORPHINE SULFATE 200 MILLIGRAM(S): 50 CAPSULE, EXTENDED RELEASE ORAL at 23:30

## 2018-06-04 NOTE — ED PROVIDER NOTE - ATTENDING CONTRIBUTION TO CARE
46 yo female with throat cancer, sent for admission and treatment of esophageal candida.  Exam as noted, will check labs and admit.

## 2018-06-04 NOTE — H&P ADULT - HISTORY OF PRESENT ILLNESS
46 y/o F with significant PMH of CHF s/p AICD, DVT on xarelto, COPD, RA, laryngeal cancer s/p excision in november, s/p  2 week history of esophageal thrush 4 weeks ago status post PO diflucan treatment. She had recent admission 5/7-5/18/2018 presented for presented for worsening fatigue, generalized weakness, and odynophagia. Pt was found to have severe laryngeal candidiasis confirmed by scope done by ENT. Pt had EGD done 5/15/2018 showed: Mild esophagitis seen in the distal third of the esophagus. Gastritis in the antrum and body of the stomach. Esophageal brushing was neg for malignancy.  Now returning bc pt was seen by Dr. Garnre today and was sent in for an admission for 10 days of IV antibiotics of micofungin 100mg q24. States that she has been having fevers Tmax 104 for the last 5 days. She also started loosing her voice, which was similar to last time. 48 y/o F with significant PMH of CHF s/p AICD+PPM, DVT on xarelto, COPD, RA, laryngeal cancer s/p excision in november, s/p  2 week history of esophageal thrush 4 weeks ago status post PO diflucan treatment. She had recent admission 5/7-5/18/2018 presented for presented for worsening fatigue, generalized weakness, and odynophagia. Pt was found to have severe laryngeal candidiasis confirmed by scope done by ENT. Pt had EGD done 5/15/2018 showed: Mild esophagitis seen in the distal third of the esophagus. Gastritis in the antrum and body of the stomach. Esophageal brushing was neg for malignancy.  Now returning bc pt was seen by Dr. Garner today and was sent in for admission for 10 days of IV antibiotics: micafungin 100mg q24. Pt states that she has been having fevers Tmax 104 for the last 5 days. She also started loosing her voice, which was similar to last time. Pt endorses using steroid inhalers at home.   Of note, pt endorses one episode of palpitations lasting 5-10 minutes on Friday, associated with orange lights flaring in her AICD/PPM machine at home. She denies getting shocked however. This was associated with L sided chest pain, but that resolved once pt calmed herself down. Pt has sob at baseline due to copd, and states it has been getting worse in the last couple of days.

## 2018-06-04 NOTE — ED ADULT TRIAGE NOTE - CHIEF COMPLAINT QUOTE
patient being sent in by Dr. Garner for admission for antibiotics, patient recently admitted and discharged for similar throat/ GI tract infection. patient reports history of throat cancer

## 2018-06-04 NOTE — ED PROVIDER NOTE - PHYSICAL EXAMINATION
CONSTITUTIONAL: Well-developed; well-nourished; in no acute distress.   SKIN: warm, dry  HEAD: Normocephalic; atraumatic.  EYES: PERRL, DL, no conjunctival erythema  ENT: No nasal discharge; airway clear. some candida visible in posterior pharynx   NECK: Supple; non tender.  CARD: S1, S2 normal;  Regular rate and rhythm.   RESP: No wheezes, rales or rhonchi.  ABD: soft ntnd  EXT: Normal ROM.    LYMPH: No acute cervical adenopathy.  NEURO: Alert, oriented, grossly unremarkable

## 2018-06-04 NOTE — ED PROVIDER NOTE - NS ED ROS FT
Eyes:  No visual changes, eye pain or discharge.  ENMT:  + sore throat and pain with swallowing has laryngeal thrush.   Cardiac:  No chest pain, SOB  Respiratory:  + non productive cough x 2 days   GI:  No nausea, vomiting, diarrhea or abdominal pain.  :  No dysuria, frequency or burning.  MS:  No  joint pain or back pain.  Neuro:  No headache or weakness.    Skin:  No skin rash.   Endocrine: No history of thyroid disease or diabetes.

## 2018-06-04 NOTE — H&P ADULT - NSHPLABSRESULTS_GEN_ALL_CORE
13.5   8.30  )-----------( 205      ( 04 Jun 2018 17:59 )             41.6     06-04  137  |  97<L>  |  12  ----------------------------<  86  6.3<HH>   |  28  |  1.0  Ca    9.1      04 Jun 2018 17:59  TPro  7.5  /  Alb  4.2  /  TBili  0.3  /  DBili  x   /  AST  27  /  ALT  9   /  AlkPhos  74  06-04  LIVER FUNCTIONS - ( 04 Jun 2018 17:59 )  Alb: 4.2 g/dL / Pro: 7.5 g/dL / ALK PHOS: 74 U/L / ALT: 9 U/L / AST: 27 U/L / GGT: x           .Blood None  05-15-18   No growth at 5 days.  --  --

## 2018-06-04 NOTE — ED PROVIDER NOTE - OBJECTIVE STATEMENT
48 yo F pmh of throat CA, CHF, AICD, DVT on xarelto, COPD, RA, lupus presents with a esophageal candida infection. Was recently admitted for the same infection which resolved and has now returned. Was seen by Dr. Garner today and was sent in for an admission for 10 days of IV antibiotics of microgungin 100mg q24. States that she has been having fevers tmax 104 for the last 5 days. Started loosing her voice which was similar to last night. More pain with swallowing. States that her last chemo was on january 18th, last surgery was in november. no cp, no sob, no abdominal pain, no n/v/d. Does report non productive cough for the last 2 days.

## 2018-06-04 NOTE — H&P ADULT - ASSESSMENT
Throat Cancer s/p excision in Nov 2017; last chemo Jan 2018. As per Onc notes from prior admission: Cervical lymphadenopathy with positive PET: No recurrent malignancy diagnosed so far on laryngeal, gingival ulcer or cervical LN biopsy.   CT of neck with contrast:   1.  No CT findings correlate to the patient's right mandibular angle pain.  2.  The previously seen enlarged/hyperenhancing/FDG-avid the left level 1B lymph node is no longer visualized and there are new left submandibular surgical clips. A previously seen left posterior cervical lymph node has decreased in size (1 cm vs 1.3 cm) 1. Worsening Laryngeal Candidiasis, s/p EGD 5/15/2018: start micafungin 100mg q24 IV, ENT c/s, ID c/s, nystatin swish and swallow, HIV neg  -hold off on inhaled steroids, monitor for any signs of sepsis, check blood cx  -mechanical soft foods, S+S eval  2. Throat Cancer s/p excision in Nov 2017; last chemo Jan 2018. As per Onc notes from prior admission: Cervical lymphadenopathy with positive PET: No recurrent malignancy diagnosed so far on laryngeal, gingival ulcer or cervical LN biopsy.   CT of neck with contrast:   No CT findings correlate to the patient's right mandibular angle pain.  The previously seen enlarged/hyperenhancing/FDG-avid the left level 1B lymph node is no longer visualized and there are new left submandibular surgical clips. A previously seen left posterior cervical lymph node has decreased in size (1 cm vs 1.3 cm)  3. COPD ex, mild: duonebs q4+prn, hold off on steroids for now  4. HTN: c/w toprol, hydralazine  5. RA: c/w outpt benlysta + methotrexate shots, c/w folic acid supplementation  6. Chronic Pain 2/2 Multiple Back Sx: c/w ms contin, dilaudid, gabapentin  7. Anxiety: c/w xanax, trazodone  8. DVT: c/w xarelto  9. GI PPx: protonix 1. Worsening Laryngeal Candidiasis, s/p EGD 5/15/2018  -start micafungin 100mg q24 IV, ENT c/s, ID c/s, nystatin swish and swallow, HIV neg  -hold off on inhaled steroids, monitor for any signs of sepsis, check blood cx  -mechanical soft foods, S+S eval  2. Throat Cancer s/p excision in Nov 2017; last chemo Jan 2018. As per Onc notes from prior admission: Cervical lymphadenopathy with positive PET: No recurrent malignancy diagnosed so far on laryngeal, gingival ulcer or cervical LN biopsy.   CT of neck with contrast:   No CT findings correlate to the patient's right mandibular angle pain.  The previously seen enlarged/hyperenhancing/FDG-avid the left level 1B lymph node is no longer visualized and there are new left submandibular surgical clips. A previously seen left posterior cervical lymph node has decreased in size (1 cm vs 1.3 cm)  3. COPD ex, mild: duonebs q4+prn, hold off on steroids for now  4. HTN: c/w toprol, hydralazine  5. RA: c/w outpt benlysta + methotrexate shots, c/w folic acid supplementation  6. Chronic Pain 2/2 Multiple Back Sx: c/w ms contin, dilaudid, gabapentin  7. Anxiety: c/w xanax, trazodone  8. DVT: c/w xarelto  9. GI PPx: protonix  10. CHF s/p AICD+PPM: EP c/s to interrogate device 1. Worsening Laryngeal Candidiasis, s/p EGD 5/15/2018  -start micafungin 100mg q24 IV, ENT c/s, ID c/s, nystatin swish and swallow, HIV neg  -hold off on inhaled steroids, monitor for any signs of sepsis, check blood cx  -mechanical soft foods, S+S eval  2. Throat Cancer s/p excision in Nov 2017; last chemo Jan 2018. As per Onc notes from prior admission: Cervical lymphadenopathy with positive PET: No recurrent malignancy diagnosed so far on laryngeal, gingival ulcer or cervical LN biopsy.   CT of neck with contrast:   No CT findings correlate to the patient's right mandibular angle pain.  The previously seen enlarged/hyperenhancing/FDG-avid the left level 1B lymph node is no longer visualized and there are new left submandibular surgical clips. A previously seen left posterior cervical lymph node has decreased in size (1 cm vs 1.3 cm)  3. COPD ex, mild: duonebs q4+prn, hold off on steroids for now  4. HTN: c/w toprol, hydralazine  5. RA: c/w outpt benlysta + methotrexate shots, c/w folic acid supplementation  6. Chronic Pain 2/2 Multiple Back Sx: c/w ms contin, dilaudid, gabapentin  7. Anxiety: c/w xanax, trazodone  8. hyperkalemia likely hemolyzed repeat bmp   9. ckd 2 monitor   8. DVT: c/w xarelto  9. GI PPx: protonix  10. CHF s/p AICD+PPM: EP c/s to interrogate device

## 2018-06-04 NOTE — H&P ADULT - NSHPPHYSICALEXAM_GEN_ALL_CORE
General: middle aged woman reclining in stretcher, talking on phone, in NAD  HEENT: no fungal plaques noted in the mouth  Cardiac: RRR, S1S2  Lungs: bilateral wheezing   Abd: NTND, +BS  LE: no swelling

## 2018-06-05 LAB
ANION GAP SERPL CALC-SCNC: 14 MMOL/L — SIGNIFICANT CHANGE UP (ref 7–14)
ANION GAP SERPL CALC-SCNC: 14 MMOL/L — SIGNIFICANT CHANGE UP (ref 7–14)
ANION GAP SERPL CALC-SCNC: 17 MMOL/L — HIGH (ref 7–14)
BASOPHILS # BLD AUTO: 0.03 K/UL — SIGNIFICANT CHANGE UP (ref 0–0.2)
BASOPHILS NFR BLD AUTO: 0.4 % — SIGNIFICANT CHANGE UP (ref 0–1)
BUN SERPL-MCNC: 12 MG/DL — SIGNIFICANT CHANGE UP (ref 10–20)
CALCIUM SERPL-MCNC: 8.6 MG/DL — SIGNIFICANT CHANGE UP (ref 8.5–10.1)
CALCIUM SERPL-MCNC: 9 MG/DL — SIGNIFICANT CHANGE UP (ref 8.5–10.1)
CALCIUM SERPL-MCNC: 9.2 MG/DL — SIGNIFICANT CHANGE UP (ref 8.5–10.1)
CHLORIDE SERPL-SCNC: 95 MMOL/L — LOW (ref 98–110)
CHLORIDE SERPL-SCNC: 97 MMOL/L — LOW (ref 98–110)
CHLORIDE SERPL-SCNC: 99 MMOL/L — SIGNIFICANT CHANGE UP (ref 98–110)
CO2 SERPL-SCNC: 25 MMOL/L — SIGNIFICANT CHANGE UP (ref 17–32)
CO2 SERPL-SCNC: 28 MMOL/L — SIGNIFICANT CHANGE UP (ref 17–32)
CO2 SERPL-SCNC: 28 MMOL/L — SIGNIFICANT CHANGE UP (ref 17–32)
CREAT SERPL-MCNC: 0.8 MG/DL — SIGNIFICANT CHANGE UP (ref 0.7–1.5)
CREAT SERPL-MCNC: 0.8 MG/DL — SIGNIFICANT CHANGE UP (ref 0.7–1.5)
CREAT SERPL-MCNC: 0.9 MG/DL — SIGNIFICANT CHANGE UP (ref 0.7–1.5)
EOSINOPHIL # BLD AUTO: 0.25 K/UL — SIGNIFICANT CHANGE UP (ref 0–0.7)
EOSINOPHIL NFR BLD AUTO: 3.3 % — SIGNIFICANT CHANGE UP (ref 0–8)
GLUCOSE SERPL-MCNC: 105 MG/DL — HIGH (ref 70–99)
GLUCOSE SERPL-MCNC: 88 MG/DL — SIGNIFICANT CHANGE UP (ref 70–99)
GLUCOSE SERPL-MCNC: 89 MG/DL — SIGNIFICANT CHANGE UP (ref 70–99)
HCT VFR BLD CALC: 38.9 % — SIGNIFICANT CHANGE UP (ref 37–47)
HGB BLD-MCNC: 12.5 G/DL — SIGNIFICANT CHANGE UP (ref 12–16)
IMM GRANULOCYTES NFR BLD AUTO: 0.5 % — HIGH (ref 0.1–0.3)
LYMPHOCYTES # BLD AUTO: 2.56 K/UL — SIGNIFICANT CHANGE UP (ref 1.2–3.4)
LYMPHOCYTES # BLD AUTO: 33.4 % — SIGNIFICANT CHANGE UP (ref 20.5–51.1)
MCHC RBC-ENTMCNC: 27.8 PG — SIGNIFICANT CHANGE UP (ref 27–31)
MCHC RBC-ENTMCNC: 32.1 G/DL — SIGNIFICANT CHANGE UP (ref 32–37)
MCV RBC AUTO: 86.6 FL — SIGNIFICANT CHANGE UP (ref 81–99)
MONOCYTES # BLD AUTO: 0.62 K/UL — HIGH (ref 0.1–0.6)
MONOCYTES NFR BLD AUTO: 8.1 % — SIGNIFICANT CHANGE UP (ref 1.7–9.3)
NEUTROPHILS # BLD AUTO: 4.16 K/UL — SIGNIFICANT CHANGE UP (ref 1.4–6.5)
NEUTROPHILS NFR BLD AUTO: 54.3 % — SIGNIFICANT CHANGE UP (ref 42.2–75.2)
PLATELET # BLD AUTO: 181 K/UL — SIGNIFICANT CHANGE UP (ref 130–400)
POTASSIUM SERPL-MCNC: 3.5 MMOL/L — SIGNIFICANT CHANGE UP (ref 3.5–5)
POTASSIUM SERPL-MCNC: 3.8 MMOL/L — SIGNIFICANT CHANGE UP (ref 3.5–5)
POTASSIUM SERPL-MCNC: 4 MMOL/L — SIGNIFICANT CHANGE UP (ref 3.5–5)
POTASSIUM SERPL-SCNC: 3.5 MMOL/L — SIGNIFICANT CHANGE UP (ref 3.5–5)
POTASSIUM SERPL-SCNC: 3.8 MMOL/L — SIGNIFICANT CHANGE UP (ref 3.5–5)
POTASSIUM SERPL-SCNC: 4 MMOL/L — SIGNIFICANT CHANGE UP (ref 3.5–5)
RBC # BLD: 4.49 M/UL — SIGNIFICANT CHANGE UP (ref 4.2–5.4)
RBC # FLD: 13.5 % — SIGNIFICANT CHANGE UP (ref 11.5–14.5)
SODIUM SERPL-SCNC: 137 MMOL/L — SIGNIFICANT CHANGE UP (ref 135–146)
SODIUM SERPL-SCNC: 139 MMOL/L — SIGNIFICANT CHANGE UP (ref 135–146)
SODIUM SERPL-SCNC: 141 MMOL/L — SIGNIFICANT CHANGE UP (ref 135–146)
WBC # BLD: 7.66 K/UL — SIGNIFICANT CHANGE UP (ref 4.8–10.8)
WBC # FLD AUTO: 7.66 K/UL — SIGNIFICANT CHANGE UP (ref 4.8–10.8)

## 2018-06-05 PROCEDURE — 99223 1ST HOSP IP/OBS HIGH 75: CPT

## 2018-06-05 RX ORDER — AMPHOTERICIN B 50 MG/12.5ML
230 INJECTION, POWDER, LYOPHILIZED, FOR SOLUTION INTRAVENOUS DAILY
Qty: 0 | Refills: 0 | Status: DISCONTINUED | OUTPATIENT
Start: 2018-06-05 | End: 2018-06-08

## 2018-06-05 RX ADMIN — MORPHINE SULFATE 200 MILLIGRAM(S): 50 CAPSULE, EXTENDED RELEASE ORAL at 13:47

## 2018-06-05 RX ADMIN — GABAPENTIN 300 MILLIGRAM(S): 400 CAPSULE ORAL at 05:59

## 2018-06-05 RX ADMIN — Medication 10 MILLIGRAM(S): at 17:05

## 2018-06-05 RX ADMIN — GABAPENTIN 300 MILLIGRAM(S): 400 CAPSULE ORAL at 17:05

## 2018-06-05 RX ADMIN — Medication 100 MILLIGRAM(S): at 21:53

## 2018-06-05 RX ADMIN — HYDROMORPHONE HYDROCHLORIDE 8 MILLIGRAM(S): 2 INJECTION INTRAMUSCULAR; INTRAVENOUS; SUBCUTANEOUS at 00:35

## 2018-06-05 RX ADMIN — Medication 3 MILLILITER(S): at 19:34

## 2018-06-05 RX ADMIN — RIVAROXABAN 20 MILLIGRAM(S): KIT at 00:08

## 2018-06-05 RX ADMIN — Medication 10 MILLIGRAM(S): at 11:31

## 2018-06-05 RX ADMIN — Medication 500000 UNIT(S): at 00:09

## 2018-06-05 RX ADMIN — Medication 50 MILLIGRAM(S): at 13:48

## 2018-06-05 RX ADMIN — Medication 500000 UNIT(S): at 11:32

## 2018-06-05 RX ADMIN — Medication 1 MILLIGRAM(S): at 11:31

## 2018-06-05 RX ADMIN — Medication 50 MILLIGRAM(S): at 17:05

## 2018-06-05 RX ADMIN — MORPHINE SULFATE 200 MILLIGRAM(S): 50 CAPSULE, EXTENDED RELEASE ORAL at 06:27

## 2018-06-05 RX ADMIN — Medication 500000 UNIT(S): at 05:59

## 2018-06-05 RX ADMIN — AMPHOTERICIN B 125 MILLIGRAM(S): 50 INJECTION, POWDER, LYOPHILIZED, FOR SOLUTION INTRAVENOUS at 10:20

## 2018-06-05 RX ADMIN — Medication 1 MILLIGRAM(S): at 22:01

## 2018-06-05 RX ADMIN — Medication 1 MILLIGRAM(S): at 11:32

## 2018-06-05 RX ADMIN — Medication 3 MILLILITER(S): at 12:11

## 2018-06-05 RX ADMIN — Medication 1 MILLIGRAM(S): at 00:08

## 2018-06-05 RX ADMIN — RIVAROXABAN 20 MILLIGRAM(S): KIT at 17:05

## 2018-06-05 RX ADMIN — Medication 50 MILLIGRAM(S): at 05:58

## 2018-06-05 RX ADMIN — MORPHINE SULFATE 200 MILLIGRAM(S): 50 CAPSULE, EXTENDED RELEASE ORAL at 01:01

## 2018-06-05 RX ADMIN — Medication 100 MILLIGRAM(S): at 00:09

## 2018-06-05 RX ADMIN — PANTOPRAZOLE SODIUM 40 MILLIGRAM(S): 20 TABLET, DELAYED RELEASE ORAL at 05:58

## 2018-06-05 RX ADMIN — HYDROMORPHONE HYDROCHLORIDE 8 MILLIGRAM(S): 2 INJECTION INTRAMUSCULAR; INTRAVENOUS; SUBCUTANEOUS at 11:32

## 2018-06-05 RX ADMIN — MORPHINE SULFATE 200 MILLIGRAM(S): 50 CAPSULE, EXTENDED RELEASE ORAL at 06:02

## 2018-06-05 RX ADMIN — MORPHINE SULFATE 200 MILLIGRAM(S): 50 CAPSULE, EXTENDED RELEASE ORAL at 21:52

## 2018-06-05 RX ADMIN — Medication 3 MILLILITER(S): at 16:09

## 2018-06-05 RX ADMIN — Medication 500000 UNIT(S): at 17:05

## 2018-06-05 NOTE — SWALLOW BEDSIDE ASSESSMENT ADULT - SLP PERTINENT HISTORY OF CURRENT PROBLEM
pt known to SLP dept. pt with h/o laryngeal ca treated with surgery, and chemoradiaition in the 90s. since then pt has has several "scrapings" as she describes it to remove laryngeal polyps and nodules. this past november pt s/p surgical removal of a lymph node and a "laryngeal mass" since then pt has had recurrent pharyngeal/esophageal thrush and dysphonia

## 2018-06-05 NOTE — SWALLOW BEDSIDE ASSESSMENT ADULT - SLP GENERAL OBSERVATIONS
pt awake alert without c/o pain. + dysphonia. pt c/o dry mouth, lack of saliva, inability to trigger a dry swallow

## 2018-06-05 NOTE — PROGRESS NOTE ADULT - SUBJECTIVE AND OBJECTIVE BOX
SUBJECTIVE:    Patient is a 47y old  Female who presents with a chief complaint of worsening laryngeal candidiasis     Patient was seen at bedside this morning. patient was in no acute distress. Patient denied any pain but is concerned about the recurrent candidiasis. Patient says she is worried her voice is not going to go back to normal.     PAST MEDICAL & SURGICAL HISTORY  PAST MEDICAL & SURGICAL HISTORY:  DVT (deep venous thrombosis)  COPD (chronic obstructive pulmonary disease)  CHF (congestive heart failure)  HTN (hypertension)  RA (rheumatoid arthritis)  Throat cancer  AICD (automatic cardioverter/defibrillator) present  Lupus  History of laryngeal cancer  S/P cholecystectomy  S/P appendectomy    SOCIAL HISTORY:    ALLERGIES:  Avelox (Other)  Plaquenil (Other)  Vantin (Other (Mild))    MEDICATIONS:  STANDING MEDICATIONS  ALBUTerol/ipratropium for Nebulization 3 milliLiter(s) Nebulizer every 4 hours  amphotericin B  liposome  IVPB 230 milliGRAM(s) IV Intermittent daily  folic acid 1 milliGRAM(s) Oral daily  gabapentin 300 milliGRAM(s) Oral two times a day  hydrALAZINE 50 milliGRAM(s) Oral every 8 hours  hydrOXYzine hydrochloride 50 milliGRAM(s) Oral two times a day  metoclopramide 10 milliGRAM(s) Oral three times a day before meals  metoprolol succinate ER 50 milliGRAM(s) Oral daily  morphine ER Tablet 200 milliGRAM(s) Oral three times a day  nystatin    Suspension 108194 Unit(s) Oral four times a day  pantoprazole    Tablet 40 milliGRAM(s) Oral before breakfast  rivaroxaban 20 milliGRAM(s) Oral every 24 hours  traZODone 100 milliGRAM(s) Oral at bedtime    PRN MEDICATIONS  ALPRAZolam 1 milliGRAM(s) Oral <User Schedule> PRN  HYDROmorphone   Tablet 8 milliGRAM(s) Oral every 4 hours PRN    VITALS:   T(F): 97.2  HR: 74  BP: 102/54  RR: 17  SpO2: 96%    HOME MEDS: </u  Benlysta: 200 milligram(s) intravenous once a week  Breo Ellipta 100 mcg-25 mcg/inh inhalation powder: 1 puff(s) inhaled once a day  Dilaudid 8 mg oral tablet: 1 tab(s) orally every 4 hours, As Needed  folic acid 1 mg oral tablet: 1 tab(s) orally once a day  gabapentin 300 mg oral tablet: orally 2 times a day  hydrALAZINE 50 mg oral tablet: orally every 8 hours  methotrexate: injectable once a week  MS Contin 200 mg oral tablet, extended release: 1 tab(s) orally every 8 hours  Prevacid 30 mg oral delayed release capsule: 1 cap(s) orally once a day  ProAir HFA 90 mcg/inh inhalation aerosol: 2 puff(s) inhaled 4 times a day, As Needed  Reglan 10 mg oral tablet: 1 tab(s) orally 3 times a day (before meals)  Toprol-XL 50 mg oral tablet, extended release: 1 tab(s) orally once a day  traZODone 100 mg oral tablet: 1 tab(s) orally once a day (at bedtime)  Vistaril 50 mg oral capsule: orally 2 times a day  Vitamin B12 1000 mcg/mL injectable solution: injectable 2 times a week  Xanax 1 mg oral tablet: orally every 4 hours, As Needed  Xarelto 20 mg oral tablet: 1 tab(s) orally once a day (in the evening)      LABS:                        13.5   8.30  )-----------( 205      ( 04 Jun 2018 17:59 )             41.6     06-05    139  |  97<L>  |  12  ----------------------------<  105<H>  3.5   |  25  |  0.8    Ca    8.6      05 Jun 2018 03:04    TPro  7.5  /  Alb  4.2  /  TBili  0.3  /  DBili  x   /  AST  27  /  ALT  9   /  AlkPhos  74  06-04    Lactate, Blood: 0.7 mmol/L (06-04-18 @ 17:59)  RADIOLOGY:    PHYSICAL EXAM:  GEN: No acute distress  HEENT: No oral thrush/erythema/vesicles noted. Muffled voice and mild dysphagia present  LUNGS: Clear to auscultation bilaterally   HEART: S1/S2 present. RRR.   ABD: Soft, non-tender, non-distended. Bowel sounds present  EXT:  no cyanosis, or edema   NEURO: AAOX3

## 2018-06-05 NOTE — PROGRESS NOTE ADULT - ASSESSMENT
48 y/o F with significant PMH of CHF s/p AICD+PPM, DVT on xarelto, COPD, RA, laryngeal cancer s/p excision in november, s/p  2 week history of esophageal thrush 4 weeks ago status post PO diflucan treatment.     # Worsening Laryngeal Candidiasis, s/p EGD 5/15/2018  - D/c micafungin,   - start liposomal amphotericin 3mg/kg IV q24 as per ID recs  - HIV negative (5/10)  - S/S eval ----> Pending  - f/u CXR --> performed, pending read   - f/u blood cultures --- pending  - ENT consult ordered --> pending    2. Throat Cancer s/p excision in Nov 2017;   -     3. COPD exacerbation - resolved  - c/w duonebs q4+prn,   - hold off on steroids     4. CHF s/p AICD+PPM:   - f/u EP consult - to interrogate device    5. HTN - stable  - c/w toprol, hydralazine    6 RA - controlled  - c/w outpt benlysta + methotrexate shots  - c/w folic acid supplementation    7. Chronic Pain 2/2 Multiple Back Sx  - c/w MS contin   - c/w dilaudid PRN  - c/w gabapentin    8. Anxiety  - c/w xanax PRN  - c/w trazodone    9. hyperkalemia - resolved    10. ckd 2 - controlled  - monitor BUN, Cr,     DVT PPX: xarelto  GI PPx: protonix 48 y/o F with significant PMH of CHF s/p AICD+PPM, DVT on xarelto, COPD, RA, laryngeal cancer s/p excision in november, s/p  2 week history of esophageal thrush 4 weeks ago status post PO diflucan treatment.     # Worsening Laryngeal Candidiasis, s/p EGD 5/15/2018  - D/c micafungin,   - start liposomal amphotericin 3mg/kg IV q24 as per ID recs  - HIV negative (5/10)  - S/S eval ----> Pending  - f/u CXR --> performed, pending read   - f/u blood cultures --- pending  - ENT consult ordered --> pending    2. Throat Cancer s/p excision in Nov 2017; as per pt she was put on CellCept for her LUPUS 2 years ago by her Rheumatologist  and stopped taking it in Feb2018. She said she is not on any other chemotherapy.   - Heme/onc consult ordered --> pending; pt was seen by Heme/Onc () on last admission    3. COPD exacerbation - resolved  - c/w duonebs q4+prn,   - hold off on steroids     4. CHF s/p AICD+PPM:   - f/u EP consult - to interrogate device    5. HTN - stable  - c/w toprol, hydralazine    6 RA - controlled  - c/w outpt benlysta + methotrexate shots  - c/w folic acid supplementation    7. Chronic Pain 2/2 Multiple Back Sx  - c/w MS contin   - c/w dilaudid PRN  - c/w gabapentin    8. Anxiety  - c/w xanax PRN  - c/w trazodone    9. hyperkalemia - resolved    10. ckd 2 - controlled  - monitor BUN, Cr,     DVT PPX: xarelto  GI PPx: protonix

## 2018-06-05 NOTE — PROGRESS NOTE ADULT - SUBJECTIVE AND OBJECTIVE BOX
ILEANA MCNEIL  47y  Female      Patient is a 47y old  Female who presents with a chief complaint of worsening laryngeal candidiasis (04 Jun 2018 20:08)      INTERVAL HPI/OVERNIGHT EVENTS:      ******************************* REVIEW OF SYSTEMS:**********************************************    Loss / hoarseness of voice.  All other review of systems negative    *********************** VITALS ******************************************    T(F): 97.2 (06-05-18 @ 06:17)  HR: 74 (06-05-18 @ 06:17) (74 - 114)  BP: 102/54 (06-05-18 @ 06:17) (102/54 - 138/76)  RR: 17 (06-05-18 @ 06:17) (17 - 20)  SpO2: 96% (06-04-18 @ 21:50) (96% - 99%)            ******************************** PHYSICAL EXAM:**************************************************  GENERAL: NAD    PSYCH:   HEENT: hoarse voice.    NERVOUS SYSTEM:  Alert & Oriented X3, MS  5/5 B/L  UE and LE ; Sensory intact    PULMONARY: CHRISTOPHER, CTA    CARDIOVASCULAR: S1S2 RRR    GI: Soft, NT, ND; BS present.    EXTREMITIES:  2+ Peripheral Pulses, No clubbing, cyanosis, or edema    LYMPH: No lymphadenopathy noted    SKIN: No rashes or lesions    ******************************************************************************************    Consultant(s) Notes Reviewed:  [x ] YES  [ ] NO    Discussed with Consultants/Other Providers [ x] YES     **************************** LABS *******************************************************                          12.5   7.66  )-----------( 181      ( 05 Jun 2018 06:50 )             38.9     06-05    141  |  99  |  12  ----------------------------<  88  4.0   |  28  |  0.9    Ca    9.0      05 Jun 2018 06:50    TPro  7.5  /  Alb  4.2  /  TBili  0.3  /  DBili  x   /  AST  27  /  ALT  9   /  AlkPhos  74  06-04          Lactate Trend  06-04 @ 17:59 Lactate:0.7         CAPILLARY BLOOD GLUCOSE              **************************Active Medications *******************************************  Avelox (Other)  Plaquenil (Other)  Vantin (Other (Mild))      ALBUTerol/ipratropium for Nebulization 3 milliLiter(s) Nebulizer every 4 hours  ALPRAZolam 1 milliGRAM(s) Oral <User Schedule> PRN  amphotericin B  liposome  IVPB 230 milliGRAM(s) IV Intermittent daily  folic acid 1 milliGRAM(s) Oral daily  gabapentin 300 milliGRAM(s) Oral two times a day  hydrALAZINE 50 milliGRAM(s) Oral every 8 hours  HYDROmorphone   Tablet 8 milliGRAM(s) Oral every 4 hours PRN  hydrOXYzine hydrochloride 50 milliGRAM(s) Oral two times a day  metoclopramide 10 milliGRAM(s) Oral three times a day before meals  metoprolol succinate ER 50 milliGRAM(s) Oral daily  morphine ER Tablet 200 milliGRAM(s) Oral three times a day  nystatin    Suspension 634192 Unit(s) Oral four times a day  pantoprazole    Tablet 40 milliGRAM(s) Oral before breakfast  rivaroxaban 20 milliGRAM(s) Oral every 24 hours  traZODone 100 milliGRAM(s) Oral at bedtime      ***************************************************  RADIOLOGY & ADDITIONAL TESTS:    Imaging Personally Reviewed:  [ ] YES  [ ] NO    HEALTH ISSUES - PROBLEM Dx:

## 2018-06-05 NOTE — CONSULT NOTE ADULT - ASSESSMENT
IMPRESSION:  Fundal laryngitis    RECOMMENDATIONS:  Liposomal Amphotericin at 3 mg/KG iv q24h.  D/C micafungin

## 2018-06-05 NOTE — SWALLOW BEDSIDE ASSESSMENT ADULT - SWALLOW EVAL: DIAGNOSIS
minimal symptoms of pharyngeal dysphagia. pt reports globus sensation however no change in vocal quality or choking. current difficulty likely related to thrush or pharyngea;/esophageal irritation

## 2018-06-05 NOTE — CONSULT NOTE ADULT - ASSESSMENT
47 y.o F with PMH of Laryngeal CA s/p laryngea mass and lymph node excision (Nov, 2017) recent Dx of Laryngeal Candidiasis s/p F/U appointment with Dr. Hanna 6/1/18 with improvement but no resolution of fungal infection.       Plan:  Cont Antifungal IV as per ID  Cont Swish and Swallow Nystatin Suspencion  Dr. Hanna will F/U 47 y.o F with PMH of Laryngeal CA s/p laryngea mass and lymph node excision (Nov, 2017) recent Dx of Laryngeal Candidiasis s/p F/U appointment with Dr. Hanna 6/1/18 with improvement but no resolution of fungal infection.       Plan:  Cont Antifungal IV as per ID  Cont Swish and Swallow Nystatin Suspension  Dr. Hanna will F/U

## 2018-06-05 NOTE — CONSULT NOTE ADULT - ATTENDING COMMENTS
Patient known to have Lupus and yeast infection of the pharynx and esophagus. Admitted for anti-fungals. Will follow.

## 2018-06-05 NOTE — PROGRESS NOTE ADULT - ASSESSMENT
46 y/o F with significant PMH of CHF s/p AICD+PPM, DVT on xarelto, COPD, RA, laryngeal cancer s/p excision in november, s/p  2 week history of esophageal thrush 4 weeks ago status post PO diflucan treatment.     # Worsening Laryngeal Candidiasis, s/p EGD 5/15/2018  - D/c micafungin,   - start liposomal amphotericin 3mg/kg IV q24 as per ID recs  - HIV negative (5/10)  - S/S eval ----> Pending  - f/u CXR --> [facility]  - f/u blood cultures --- pending  - ENT consult ordered --> pending    2. Throat Cancer s/p excision in Nov 2017; as per pt she was put on CellCept for her LUPUS 2 years ago by her Rheumatologist  and stopped taking it in Feb2018. She said she is not on any other chemotherapy.   - Heme/onc consult ordered --> pending; pt was seen by Heme/Onc () on last admission    3. COPD exacerbation - resolved  - c/w duonebs q4+prn,   - hold off on steroids     4. CHF s/p AICD+PPM:   - f/u EP consult - to interrogate device    5. HTN - stable  - c/w toprol, hydralazine    6 RA - controlled  - c/w outpt benlysta + methotrexate shots  - c/w folic acid supplementation    7. Chronic Pain 2/2 Multiple Back Sx  - c/w MS contin   - c/w dilaudid PRN  - c/w gabapentin    8. Anxiety  - c/w xanax PRN  - c/w trazodone    9. hyperkalemia - resolved    10. ckd 2 - controlled  - monitor BUN, Cr,     DVT PPX: Already  on xarelto.  GI PPx: protonix

## 2018-06-06 LAB
ANION GAP SERPL CALC-SCNC: 11 MMOL/L — SIGNIFICANT CHANGE UP (ref 7–14)
BASOPHILS # BLD AUTO: 0.03 K/UL — SIGNIFICANT CHANGE UP (ref 0–0.2)
BASOPHILS NFR BLD AUTO: 0.5 % — SIGNIFICANT CHANGE UP (ref 0–1)
BUN SERPL-MCNC: 12 MG/DL — SIGNIFICANT CHANGE UP (ref 10–20)
CALCIUM SERPL-MCNC: 8.4 MG/DL — LOW (ref 8.5–10.1)
CHLORIDE SERPL-SCNC: 102 MMOL/L — SIGNIFICANT CHANGE UP (ref 98–110)
CO2 SERPL-SCNC: 28 MMOL/L — SIGNIFICANT CHANGE UP (ref 17–32)
CREAT SERPL-MCNC: 1.1 MG/DL — SIGNIFICANT CHANGE UP (ref 0.7–1.5)
EOSINOPHIL # BLD AUTO: 0.3 K/UL — SIGNIFICANT CHANGE UP (ref 0–0.7)
EOSINOPHIL NFR BLD AUTO: 4.6 % — SIGNIFICANT CHANGE UP (ref 0–8)
ERYTHROCYTE [SEDIMENTATION RATE] IN BLOOD: 20 MM/HR — HIGH (ref 0–15)
GLUCOSE SERPL-MCNC: 126 MG/DL — HIGH (ref 70–99)
HCT VFR BLD CALC: 35.2 % — LOW (ref 37–47)
HGB BLD-MCNC: 11.2 G/DL — LOW (ref 12–16)
IMM GRANULOCYTES NFR BLD AUTO: 0.6 % — HIGH (ref 0.1–0.3)
LYMPHOCYTES # BLD AUTO: 2.62 K/UL — SIGNIFICANT CHANGE UP (ref 1.2–3.4)
LYMPHOCYTES # BLD AUTO: 39.9 % — SIGNIFICANT CHANGE UP (ref 20.5–51.1)
MAGNESIUM SERPL-MCNC: 1.7 MG/DL — LOW (ref 1.8–2.4)
MCHC RBC-ENTMCNC: 27.7 PG — SIGNIFICANT CHANGE UP (ref 27–31)
MCHC RBC-ENTMCNC: 31.8 G/DL — LOW (ref 32–37)
MCV RBC AUTO: 86.9 FL — SIGNIFICANT CHANGE UP (ref 81–99)
MONOCYTES # BLD AUTO: 0.75 K/UL — HIGH (ref 0.1–0.6)
MONOCYTES NFR BLD AUTO: 11.4 % — HIGH (ref 1.7–9.3)
NEUTROPHILS # BLD AUTO: 2.82 K/UL — SIGNIFICANT CHANGE UP (ref 1.4–6.5)
NEUTROPHILS NFR BLD AUTO: 43 % — SIGNIFICANT CHANGE UP (ref 42.2–75.2)
NRBC # BLD: 0 /100 WBCS — SIGNIFICANT CHANGE UP (ref 0–0)
PLATELET # BLD AUTO: 160 K/UL — SIGNIFICANT CHANGE UP (ref 130–400)
POTASSIUM SERPL-MCNC: 4 MMOL/L — SIGNIFICANT CHANGE UP (ref 3.5–5)
POTASSIUM SERPL-SCNC: 4 MMOL/L — SIGNIFICANT CHANGE UP (ref 3.5–5)
RBC # BLD: 4.05 M/UL — LOW (ref 4.2–5.4)
RBC # FLD: 13.5 % — SIGNIFICANT CHANGE UP (ref 11.5–14.5)
SODIUM SERPL-SCNC: 141 MMOL/L — SIGNIFICANT CHANGE UP (ref 135–146)
WBC # BLD: 6.56 K/UL — SIGNIFICANT CHANGE UP (ref 4.8–10.8)
WBC # FLD AUTO: 6.56 K/UL — SIGNIFICANT CHANGE UP (ref 4.8–10.8)

## 2018-06-06 RX ORDER — DOCUSATE SODIUM 100 MG
100 CAPSULE ORAL THREE TIMES A DAY
Qty: 0 | Refills: 0 | Status: DISCONTINUED | OUTPATIENT
Start: 2018-06-06 | End: 2018-06-26

## 2018-06-06 RX ORDER — SENNA PLUS 8.6 MG/1
2 TABLET ORAL AT BEDTIME
Qty: 0 | Refills: 0 | Status: DISCONTINUED | OUTPATIENT
Start: 2018-06-06 | End: 2018-06-26

## 2018-06-06 RX ADMIN — Medication 10 MILLIGRAM(S): at 12:30

## 2018-06-06 RX ADMIN — MORPHINE SULFATE 200 MILLIGRAM(S): 50 CAPSULE, EXTENDED RELEASE ORAL at 21:50

## 2018-06-06 RX ADMIN — GABAPENTIN 300 MILLIGRAM(S): 400 CAPSULE ORAL at 17:11

## 2018-06-06 RX ADMIN — Medication 1 MILLIGRAM(S): at 06:08

## 2018-06-06 RX ADMIN — Medication 500000 UNIT(S): at 06:00

## 2018-06-06 RX ADMIN — MORPHINE SULFATE 200 MILLIGRAM(S): 50 CAPSULE, EXTENDED RELEASE ORAL at 00:21

## 2018-06-06 RX ADMIN — Medication 1 MILLIGRAM(S): at 12:30

## 2018-06-06 RX ADMIN — SENNA PLUS 2 TABLET(S): 8.6 TABLET ORAL at 21:51

## 2018-06-06 RX ADMIN — AMPHOTERICIN B 125 MILLIGRAM(S): 50 INJECTION, POWDER, LYOPHILIZED, FOR SOLUTION INTRAVENOUS at 15:01

## 2018-06-06 RX ADMIN — PANTOPRAZOLE SODIUM 40 MILLIGRAM(S): 20 TABLET, DELAYED RELEASE ORAL at 06:00

## 2018-06-06 RX ADMIN — Medication 50 MILLIGRAM(S): at 17:11

## 2018-06-06 RX ADMIN — Medication 50 MILLIGRAM(S): at 06:00

## 2018-06-06 RX ADMIN — Medication 3 MILLILITER(S): at 19:20

## 2018-06-06 RX ADMIN — MORPHINE SULFATE 200 MILLIGRAM(S): 50 CAPSULE, EXTENDED RELEASE ORAL at 13:51

## 2018-06-06 RX ADMIN — Medication 500000 UNIT(S): at 17:11

## 2018-06-06 RX ADMIN — Medication 50 MILLIGRAM(S): at 13:51

## 2018-06-06 RX ADMIN — HYDROMORPHONE HYDROCHLORIDE 8 MILLIGRAM(S): 2 INJECTION INTRAMUSCULAR; INTRAVENOUS; SUBCUTANEOUS at 13:00

## 2018-06-06 RX ADMIN — Medication 1 MILLIGRAM(S): at 22:12

## 2018-06-06 RX ADMIN — Medication 100 MILLIGRAM(S): at 21:51

## 2018-06-06 RX ADMIN — GABAPENTIN 300 MILLIGRAM(S): 400 CAPSULE ORAL at 06:00

## 2018-06-06 RX ADMIN — HYDROMORPHONE HYDROCHLORIDE 8 MILLIGRAM(S): 2 INJECTION INTRAMUSCULAR; INTRAVENOUS; SUBCUTANEOUS at 12:30

## 2018-06-06 RX ADMIN — Medication 500000 UNIT(S): at 23:44

## 2018-06-06 RX ADMIN — Medication 1 MILLIGRAM(S): at 13:54

## 2018-06-06 RX ADMIN — MORPHINE SULFATE 200 MILLIGRAM(S): 50 CAPSULE, EXTENDED RELEASE ORAL at 06:00

## 2018-06-06 RX ADMIN — Medication 10 MILLIGRAM(S): at 17:11

## 2018-06-06 RX ADMIN — MORPHINE SULFATE 200 MILLIGRAM(S): 50 CAPSULE, EXTENDED RELEASE ORAL at 14:20

## 2018-06-06 RX ADMIN — Medication 500000 UNIT(S): at 12:30

## 2018-06-06 RX ADMIN — Medication 3 MILLILITER(S): at 15:47

## 2018-06-06 RX ADMIN — RIVAROXABAN 20 MILLIGRAM(S): KIT at 17:11

## 2018-06-06 RX ADMIN — Medication 500000 UNIT(S): at 00:22

## 2018-06-06 RX ADMIN — Medication 10 MILLIGRAM(S): at 06:00

## 2018-06-06 NOTE — PROGRESS NOTE ADULT - ASSESSMENT
46 y/o F with significant PMH of CHF s/p AICD+PPM, DVT on xarelto, COPD, RA, laryngeal cancer s/p excision in november, s/p  2 week history of esophageal thrush 4 weeks ago status post PO diflucan treatment.     # Worsening Laryngeal Candidiasis, s/p EGD 5/15/2018  modified diet  IV antifungal per ID recs  f/u LFTs    2. Throat Cancer s/p excision in Nov 2017; as per pt she was put on CellCept for her LUPUS 2 years ago by her Rheumatologist  and stopped taking it in Feb2018. She said she is not on any other chemotherapy.   hem/onc f/u    3. COPD exacerbation - resolved  - c/w duonebs q4+prn,   - hold off on steroids   resume home breo, family to bring in    4. CHF s/p AICD+PPM:   - f/u EP consult - to interrogate device pending result    5. HTN - stable  - c/w toprol, hydralazine    6 RA - controlled  - c/w outpt benlysta + methotrexate shots  - c/w folic acid supplementation    7. Chronic Pain 2/2 Multiple Back Sx  - c/w MS contin   - c/w dilaudid PRN  - c/w gabapentin    8. Anxiety  - c/w xanax PRN  - c/w trazodone    9. hyperkalemia - resolved    10. ckd 2 - controlled  - monitor BUN, Cr,     DVT PPX: Already  on xarelto.  GI PPx: protonix

## 2018-06-06 NOTE — PROGRESS NOTE ADULT - SUBJECTIVE AND OBJECTIVE BOX
ILEANA MCNEIL  47y  Female      Patient is a 47y old  Female who presents with a chief complaint of worsening laryngeal candidiasis (04 Jun 2018 20:08)      INTERVAL HPI/OVERNIGHT EVENTS: c/o sore throat, but able to eat/swallow/breath      REVIEW OF SYSTEMS:  as above  All other review of systems negative    T(C): 37 (06-06-18 @ 13:00), Max: 37 (06-06-18 @ 13:00)  HR: 73 (06-06-18 @ 13:00) (69 - 77)  BP: 122/67 (06-06-18 @ 13:00) (105/55 - 122/67)  RR: 16 (06-06-18 @ 13:00) (16 - 18)  SpO2: 95% (06-06-18 @ 08:31) (95% - 95%)  Wt(kg): --Vital Signs Last 24 Hrs  T(C): 37 (06 Jun 2018 13:00), Max: 37 (06 Jun 2018 13:00)  T(F): 98.6 (06 Jun 2018 13:00), Max: 98.6 (06 Jun 2018 13:00)  HR: 73 (06 Jun 2018 13:00) (69 - 77)  BP: 122/67 (06 Jun 2018 13:00) (105/55 - 122/67)  BP(mean): --  RR: 16 (06 Jun 2018 13:00) (16 - 18)  SpO2: 95% (06 Jun 2018 08:31) (95% - 95%)      06-06-18 @ 07:01  -  06-06-18 @ 18:24  --------------------------------------------------------  IN: 550 mL / OUT: 0 mL / NET: 550 mL        PHYSICAL EXAM:  GENERAL: NAD  HEENT: posterior pharynx deep white spots subtle  PSYCH: no agitation, baseline mentation  NERVOUS SYSTEM:  Alert & Oriented X3, no new focal deficits  PULMONARY: expiratory wheeze b/l, hoarse  CARDIOVASCULAR: Regular rate and rhythm; No murmurs, rubs, or gallops  GI: Soft, Nontender, Nondistended; Bowel sounds present  EXTREMITIES:  2+ Peripheral Pulses, No clubbing, cyanosis, or edema    Consultant(s) Notes Reviewed:  [x ] YES  [ ] NO    Discussed with Consultants/Other Providers [ x] YES     LABS                          11.2   6.56  )-----------( 160      ( 06 Jun 2018 06:41 )             35.2     06-06    141  |  102  |  12  ----------------------------<  126<H>  4.0   |  28  |  1.1    Ca    8.4<L>      06 Jun 2018 06:41  Mg     1.7     06-06            Lactate Trend  06-04 @ 17:59 Lactate:0.7         CAPILLARY BLOOD GLUCOSE            RADIOLOGY & ADDITIONAL TESTS:    Imaging Personally Reviewed:  [ ] YES  [ x] NO    HEALTH ISSUES - PROBLEM Dx:

## 2018-06-06 NOTE — PROGRESS NOTE ADULT - SUBJECTIVE AND OBJECTIVE BOX
ILEANA MCNEIL  47y, Female      OVERNIGHT EVENTS:    better    VITALS:  T(F): 97.4, Max: 97.8 (06-05-18 @ 14:49)  HR: 77  BP: 105/55  RR: 17Vital Signs Last 24 Hrs  T(C): 36.3 (06 Jun 2018 05:13), Max: 36.6 (05 Jun 2018 14:49)  T(F): 97.4 (06 Jun 2018 05:13), Max: 97.8 (05 Jun 2018 14:49)  HR: 77 (06 Jun 2018 05:13) (69 - 77)  BP: 105/55 (06 Jun 2018 05:13) (105/55 - 119/55)  BP(mean): --  RR: 17 (06 Jun 2018 05:13) (17 - 18)  SpO2: --    TESTS & MEASUREMENTS:                        12.5   7.66  )-----------( 181      ( 05 Jun 2018 06:50 )             38.9     06-05    141  |  99  |  12  ----------------------------<  88  4.0   |  28  |  0.9    Ca    9.0      05 Jun 2018 06:50    TPro  7.5  /  Alb  4.2  /  TBili  0.3  /  DBili  x   /  AST  27  /  ALT  9   /  AlkPhos  74  06-04    LIVER FUNCTIONS - ( 04 Jun 2018 17:59 )  Alb: 4.2 g/dL / Pro: 7.5 g/dL / ALK PHOS: 74 U/L / ALT: 9 U/L / AST: 27 U/L / GGT: x             Culture - Blood (collected 06-05-18 @ 03:04)  Source: .Blood Blood  Preliminary Report (06-06-18 @ 06:02):    No growth to date.    Culture - Blood (collected 06-04-18 @ 17:59)  Source: .Blood Blood  Preliminary Report (06-06-18 @ 01:03):    No growth to date.    Culture - Blood (collected 06-04-18 @ 17:59)  Source: .Blood Blood  Preliminary Report (06-06-18 @ 01:03):    No growth to date.            RADIOLOGY & ADDITIONAL TESTS:    ANTIBIOTICS:  amphotericin B  liposome  IVPB 230 milliGRAM(s) IV Intermittent daily  nystatin    Suspension 948114 Unit(s) Oral four times a day

## 2018-06-06 NOTE — PROGRESS NOTE ADULT - ASSESSMENT
46 y/o F with significant PMH of CHF s/p AICD+PPM, DVT on xarelto, COPD, RA, laryngeal cancer s/p excision in november, s/p  2 week history of esophageal thrush 4 weeks ago status post PO diflucan treatment.     # Worsening Laryngeal Candidiasis, s/p EGD 5/15/2018  - c/w liposomal amphotericin 3mg/kg IV q24 as per ID recs  - c/w swish and swallow Nystatin as per ENT recs  - HIV negative (5/10)  - f/u CXR --> no ACS   - f/u blood cultures ---> pending 2nd culture    2. Throat Cancer s/p excision in Nov 2017; as per pt she was put on CellCept for her LUPUS 2 years ago by her Rheumatologist  and stopped taking it in Feb2018. She said she is not on any other chemotherapy.   - Heme/onc consult ordered --> pending; pt was seen by Heme/Onc () on last admission    3. COPD exacerbation - resolved  - c/w duonebs q4+prn,   - hold off on steroids     4. CHF s/p AICD+PPM:   - f/u EP consult - to interrogate device    5. HTN - stable  - c/w toprol, hydralazine    6 RA - controlled  - c/w outpt benlysta + methotrexate shots  - c/w folic acid supplementation    7. Chronic Pain 2/2 Multiple Back Sx  - c/w MS contin   - c/w dilaudid PRN  - c/w gabapentin    8. Anxiety  - c/w xanax PRN  - c/w trazodone    9. hyperkalemia - resolved    10. ckd 2 - controlled  - monitor BUN, Cr,     DVT PPX: xarelto  GI PPx: protonix  Dispo: Home after medically stable. 46 y/o F with significant PMH of CHF s/p AICD+PPM, DVT on xarelto, COPD, RA, laryngeal cancer s/p excision in november, s/p  2 week history of esophageal thrush 4 weeks ago status post PO diflucan treatment.     # Worsening Laryngeal Candidiasis, s/p EGD 5/15/2018  - c/w liposomal amphotericin 3mg/kg IV q24 as per ID recs  - f/u BMP, Cr, CBC daily  - check daily weights.   - c/w swish and swallow Nystatin as per ENT recs  - HIV negative (5/10)  - f/u CXR --> no ACS   - f/u blood cultures ---> pending 2nd culture    2. Throat Cancer s/p excision in Nov 2017; as per pt she was put on CellCept for her LUPUS 2 years ago by her Rheumatologist  and stopped taking it in Feb2018. She said she is not on any other chemotherapy.   - Heme/onc consult ordered --> pending; pt was seen by Heme/Onc () on last admission    3. COPD exacerbation - resolved  - c/w duonebs q4+prn,   - hold off on steroids     4. CHF s/p AICD+PPM:   - f/u EP consult - to interrogate device    5. HTN - stable  - c/w toprol, hydralazine    6 RA - controlled  - c/w outpt benlysta + methotrexate shots  - c/w folic acid supplementation    7. Chronic Pain 2/2 Multiple Back Sx  - c/w MS contin   - c/w dilaudid PRN  - c/w gabapentin    8. Anxiety  - c/w xanax PRN  - c/w trazodone    9. hyperkalemia - resolved    10. ckd 2 - controlled  - monitor BUN, Cr,     DVT PPX: xarelto  GI PPx: protonix  Dispo: Home after medically stable.

## 2018-06-06 NOTE — PROGRESS NOTE ADULT - SUBJECTIVE AND OBJECTIVE BOX
SUBJECTIVE:    Patient is a 47y old  Female who presents with a chief complaint of worsening laryngeal candidiasis      Patient was seen at bedside this morning. Patient has no concerns over night and was in no acute distress.     PAST MEDICAL & SURGICAL HISTORY  PAST MEDICAL & SURGICAL HISTORY:  DVT (deep venous thrombosis)  COPD (chronic obstructive pulmonary disease)  CHF (congestive heart failure)  HTN (hypertension)  RA (rheumatoid arthritis)  Throat cancer  AICD (automatic cardioverter/defibrillator) present  Lupus  History of laryngeal cancer  S/P cholecystectomy  S/P appendectomy    SOCIAL HISTORY:    ALLERGIES:  Avelox (Other)  Plaquenil (Other)  Vantin (Other (Mild))    MEDICATIONS:  STANDING MEDICATIONS  ALBUTerol/ipratropium for Nebulization 3 milliLiter(s) Nebulizer every 4 hours  amphotericin B  liposome  IVPB 230 milliGRAM(s) IV Intermittent daily  docusate sodium 100 milliGRAM(s) Oral three times a day  folic acid 1 milliGRAM(s) Oral daily  gabapentin 300 milliGRAM(s) Oral two times a day  hydrALAZINE 50 milliGRAM(s) Oral every 8 hours  hydrOXYzine hydrochloride 50 milliGRAM(s) Oral two times a day  metoclopramide 10 milliGRAM(s) Oral three times a day before meals  metoprolol succinate ER 50 milliGRAM(s) Oral daily  morphine ER Tablet 200 milliGRAM(s) Oral three times a day  nystatin    Suspension 255639 Unit(s) Oral four times a day  pantoprazole    Tablet 40 milliGRAM(s) Oral before breakfast  rivaroxaban 20 milliGRAM(s) Oral every 24 hours  senna 2 Tablet(s) Oral at bedtime  traZODone 100 milliGRAM(s) Oral at bedtime    PRN MEDICATIONS  ALPRAZolam 1 milliGRAM(s) Oral <User Schedule> PRN  HYDROmorphone   Tablet 8 milliGRAM(s) Oral every 4 hours PRN    VITALS:   T(F): 97.4  HR: 77  BP: 105/55  RR: 17  SpO2: 95%    HOME MEDS: </u  Benlysta: 200 milligram(s) intravenous once a week  Breo Ellipta 100 mcg-25 mcg/inh inhalation powder: 1 puff(s) inhaled once a day  Dilaudid 8 mg oral tablet: 1 tab(s) orally every 4 hours, As Needed  folic acid 1 mg oral tablet: 1 tab(s) orally once a day  gabapentin 300 mg oral tablet: orally 2 times a day  hydrALAZINE 50 mg oral tablet: orally every 8 hours  methotrexate: injectable once a week  MS Contin 200 mg oral tablet, extended release: 1 tab(s) orally every 8 hours  Prevacid 30 mg oral delayed release capsule: 1 cap(s) orally once a day  ProAir HFA 90 mcg/inh inhalation aerosol: 2 puff(s) inhaled 4 times a day, As Needed  Reglan 10 mg oral tablet: 1 tab(s) orally 3 times a day (before meals)  Toprol-XL 50 mg oral tablet, extended release: 1 tab(s) orally once a day  traZODone 100 mg oral tablet: 1 tab(s) orally once a day (at bedtime)  Vistaril 50 mg oral capsule: orally 2 times a day  Vitamin B12 1000 mcg/mL injectable solution: injectable 2 times a week  Xanax 1 mg oral tablet: orally every 4 hours, As Needed  Xarelto 20 mg oral tablet: 1 tab(s) orally once a day (in the evening)      LABS:                        11.2   6.56  )-----------( 160      ( 06 Jun 2018 06:41 )             35.2     06-06    141  |  102  |  12  ----------------------------<  126<H>  4.0   |  28  |  1.1    Ca    8.4<L>      06 Jun 2018 06:41  Mg     1.7     06-06    TPro  7.5  /  Alb  4.2  /  TBili  0.3  /  DBili  x   /  AST  27  /  ALT  9   /  AlkPhos  74  06-04    Culture - Blood (collected 05 Jun 2018 03:04)  Source: .Blood Blood  Preliminary Report (06 Jun 2018 06:02):    No growth to date.    Culture - Blood (collected 04 Jun 2018 17:59)  Source: .Blood Blood  Preliminary Report (06 Jun 2018 01:03):    No growth to date.    Culture - Blood (collected 04 Jun 2018 17:59)  Source: .Blood Blood  Preliminary Report (06 Jun 2018 01:03):    No growth to date.    RADIOLOGY:    PHYSICAL EXAM:  GEN: No acute distress  HEENT: No changes noted  LUNGS: Clear to auscultation bilaterally   HEART: S1/S2 present. RRR.   ABD: Soft, non-tender, non-distended. Bowel sounds present  EXT: no cyanosis or edema noted  NEURO: AAOX3

## 2018-06-07 LAB
ANION GAP SERPL CALC-SCNC: 17 MMOL/L — HIGH (ref 7–14)
APPEARANCE UR: CLEAR — SIGNIFICANT CHANGE UP
BACTERIA # UR AUTO: (no result) /HPF
BASOPHILS # BLD AUTO: 0.04 K/UL — SIGNIFICANT CHANGE UP (ref 0–0.2)
BASOPHILS NFR BLD AUTO: 0.5 % — SIGNIFICANT CHANGE UP (ref 0–1)
BILIRUB UR-MCNC: NEGATIVE — SIGNIFICANT CHANGE UP
BUN SERPL-MCNC: 11 MG/DL — SIGNIFICANT CHANGE UP (ref 10–20)
CALCIUM SERPL-MCNC: 9 MG/DL — SIGNIFICANT CHANGE UP (ref 8.5–10.1)
CHLORIDE SERPL-SCNC: 99 MMOL/L — SIGNIFICANT CHANGE UP (ref 98–110)
CO2 SERPL-SCNC: 26 MMOL/L — SIGNIFICANT CHANGE UP (ref 17–32)
COLOR SPEC: YELLOW — SIGNIFICANT CHANGE UP
CREAT ?TM UR-MCNC: 72 MG/DL — SIGNIFICANT CHANGE UP
CREAT SERPL-MCNC: 1.3 MG/DL — SIGNIFICANT CHANGE UP (ref 0.7–1.5)
CRP SERPL-MCNC: 0.7 MG/DL — HIGH (ref 0–0.4)
DIFF PNL FLD: (no result)
EOSINOPHIL # BLD AUTO: 0.32 K/UL — SIGNIFICANT CHANGE UP (ref 0–0.7)
EOSINOPHIL NFR BLD AUTO: 3.7 % — SIGNIFICANT CHANGE UP (ref 0–8)
GLUCOSE SERPL-MCNC: 96 MG/DL — SIGNIFICANT CHANGE UP (ref 70–99)
GLUCOSE UR QL: NEGATIVE MG/DL — SIGNIFICANT CHANGE UP
HCT VFR BLD CALC: 41 % — SIGNIFICANT CHANGE UP (ref 37–47)
HGB BLD-MCNC: 13.4 G/DL — SIGNIFICANT CHANGE UP (ref 12–16)
HYALINE CASTS # UR AUTO: NEGATIVE — SIGNIFICANT CHANGE UP
IMM GRANULOCYTES NFR BLD AUTO: 0.5 % — HIGH (ref 0.1–0.3)
KETONES UR-MCNC: NEGATIVE — SIGNIFICANT CHANGE UP
LEUKOCYTE ESTERASE UR-ACNC: (no result)
LYMPHOCYTES # BLD AUTO: 3.39 K/UL — SIGNIFICANT CHANGE UP (ref 1.2–3.4)
LYMPHOCYTES # BLD AUTO: 39 % — SIGNIFICANT CHANGE UP (ref 20.5–51.1)
MAGNESIUM SERPL-MCNC: 1.9 MG/DL — SIGNIFICANT CHANGE UP (ref 1.8–2.4)
MCHC RBC-ENTMCNC: 28.3 PG — SIGNIFICANT CHANGE UP (ref 27–31)
MCHC RBC-ENTMCNC: 32.7 G/DL — SIGNIFICANT CHANGE UP (ref 32–37)
MCV RBC AUTO: 86.5 FL — SIGNIFICANT CHANGE UP (ref 81–99)
MONOCYTES # BLD AUTO: 0.99 K/UL — HIGH (ref 0.1–0.6)
MONOCYTES NFR BLD AUTO: 11.4 % — HIGH (ref 1.7–9.3)
NEUTROPHILS # BLD AUTO: 3.92 K/UL — SIGNIFICANT CHANGE UP (ref 1.4–6.5)
NEUTROPHILS NFR BLD AUTO: 44.9 % — SIGNIFICANT CHANGE UP (ref 42.2–75.2)
NITRITE UR-MCNC: NEGATIVE — SIGNIFICANT CHANGE UP
NRBC # BLD: 0 /100 WBCS — SIGNIFICANT CHANGE UP (ref 0–0)
OSMOLALITY UR: 286 MOS/KG — SIGNIFICANT CHANGE UP (ref 50–1400)
PH UR: 6 — SIGNIFICANT CHANGE UP (ref 5–8)
PLATELET # BLD AUTO: 179 K/UL — SIGNIFICANT CHANGE UP (ref 130–400)
POTASSIUM SERPL-MCNC: 4.4 MMOL/L — SIGNIFICANT CHANGE UP (ref 3.5–5)
POTASSIUM SERPL-SCNC: 4.4 MMOL/L — SIGNIFICANT CHANGE UP (ref 3.5–5)
POTASSIUM UR-SCNC: 22 MMOL/L — SIGNIFICANT CHANGE UP
PROT ?TM UR-MCNC: 9 MG/DLG/24H — SIGNIFICANT CHANGE UP
PROT UR-MCNC: NEGATIVE MG/DL — SIGNIFICANT CHANGE UP
PROT/CREAT UR-RTO: 0.1 RATIO — SIGNIFICANT CHANGE UP (ref 0–0.2)
RBC # BLD: 4.74 M/UL — SIGNIFICANT CHANGE UP (ref 4.2–5.4)
RBC # FLD: 13.3 % — SIGNIFICANT CHANGE UP (ref 11.5–14.5)
SODIUM SERPL-SCNC: 142 MMOL/L — SIGNIFICANT CHANGE UP (ref 135–146)
SODIUM UR-SCNC: 73 MMOL/L — SIGNIFICANT CHANGE UP
SP GR SPEC: 1.01 — SIGNIFICANT CHANGE UP (ref 1.01–1.03)
UROBILINOGEN FLD QL: 0.2 MG/DL — SIGNIFICANT CHANGE UP (ref 0.2–0.2)
WBC # BLD: 8.7 K/UL — SIGNIFICANT CHANGE UP (ref 4.8–10.8)
WBC # FLD AUTO: 8.7 K/UL — SIGNIFICANT CHANGE UP (ref 4.8–10.8)

## 2018-06-07 RX ORDER — MAGNESIUM OXIDE 400 MG ORAL TABLET 241.3 MG
400 TABLET ORAL ONCE
Qty: 0 | Refills: 0 | Status: COMPLETED | OUTPATIENT
Start: 2018-06-07 | End: 2018-06-07

## 2018-06-07 RX ADMIN — Medication 50 MILLIGRAM(S): at 17:33

## 2018-06-07 RX ADMIN — Medication 1 MILLIGRAM(S): at 21:56

## 2018-06-07 RX ADMIN — Medication 50 MILLIGRAM(S): at 06:31

## 2018-06-07 RX ADMIN — MORPHINE SULFATE 200 MILLIGRAM(S): 50 CAPSULE, EXTENDED RELEASE ORAL at 21:56

## 2018-06-07 RX ADMIN — Medication 10 MILLIGRAM(S): at 17:33

## 2018-06-07 RX ADMIN — AMPHOTERICIN B 125 MILLIGRAM(S): 50 INJECTION, POWDER, LYOPHILIZED, FOR SOLUTION INTRAVENOUS at 12:00

## 2018-06-07 RX ADMIN — MORPHINE SULFATE 200 MILLIGRAM(S): 50 CAPSULE, EXTENDED RELEASE ORAL at 13:48

## 2018-06-07 RX ADMIN — Medication 1 MILLIGRAM(S): at 11:24

## 2018-06-07 RX ADMIN — Medication 500000 UNIT(S): at 17:33

## 2018-06-07 RX ADMIN — Medication 500000 UNIT(S): at 23:16

## 2018-06-07 RX ADMIN — Medication 100 MILLIGRAM(S): at 21:55

## 2018-06-07 RX ADMIN — Medication 1 MILLIGRAM(S): at 14:13

## 2018-06-07 RX ADMIN — MORPHINE SULFATE 200 MILLIGRAM(S): 50 CAPSULE, EXTENDED RELEASE ORAL at 22:26

## 2018-06-07 RX ADMIN — Medication 100 MILLIGRAM(S): at 06:31

## 2018-06-07 RX ADMIN — Medication 10 MILLIGRAM(S): at 06:31

## 2018-06-07 RX ADMIN — Medication 3 MILLILITER(S): at 18:02

## 2018-06-07 RX ADMIN — Medication 500000 UNIT(S): at 11:24

## 2018-06-07 RX ADMIN — Medication 50 MILLIGRAM(S): at 21:55

## 2018-06-07 RX ADMIN — MORPHINE SULFATE 200 MILLIGRAM(S): 50 CAPSULE, EXTENDED RELEASE ORAL at 06:30

## 2018-06-07 RX ADMIN — MAGNESIUM OXIDE 400 MG ORAL TABLET 400 MILLIGRAM(S): 241.3 TABLET ORAL at 11:24

## 2018-06-07 RX ADMIN — Medication 100 MILLIGRAM(S): at 14:13

## 2018-06-07 RX ADMIN — Medication 1 MILLIGRAM(S): at 06:30

## 2018-06-07 RX ADMIN — SENNA PLUS 2 TABLET(S): 8.6 TABLET ORAL at 21:55

## 2018-06-07 RX ADMIN — Medication 10 MILLIGRAM(S): at 11:24

## 2018-06-07 RX ADMIN — GABAPENTIN 300 MILLIGRAM(S): 400 CAPSULE ORAL at 06:31

## 2018-06-07 RX ADMIN — GABAPENTIN 300 MILLIGRAM(S): 400 CAPSULE ORAL at 17:33

## 2018-06-07 RX ADMIN — RIVAROXABAN 20 MILLIGRAM(S): KIT at 17:33

## 2018-06-07 RX ADMIN — Medication 500000 UNIT(S): at 06:31

## 2018-06-07 RX ADMIN — Medication 3 MILLILITER(S): at 12:38

## 2018-06-07 RX ADMIN — PANTOPRAZOLE SODIUM 40 MILLIGRAM(S): 20 TABLET, DELAYED RELEASE ORAL at 06:31

## 2018-06-07 NOTE — PROGRESS NOTE ADULT - SUBJECTIVE AND OBJECTIVE BOX
ILEANA MCNEIL  47y  Female      Patient is a 47y old  Female who presents with a chief complaint of worsening laryngeal candidiasis (2018 20:08)      INTERVAL HPI/OVERNIGHT EVENTS: c/o a bit of coughing with blood. no trouble breathing or swallowing      REVIEW OF SYSTEMS:  as above  All other review of systems negative    T(C): 36.9 (18 @ 13:00), Max: 36.9 (18 @ 13:00)  HR: 81 (18 @ 13:00) (71 - 87)  BP: 123/64 (18 @ 13:00) (96/52 - 123/64)  RR: 16 (18 @ 13:00) (16 - 17)  SpO2: 96% (18 @ 09:45) (96% - 96%)  Wt(kg): --Vital Signs Last 24 Hrs  T(C): 36.9 (2018 13:00), Max: 36.9 (2018 13:00)  T(F): 98.4 (2018 13:00), Max: 98.4 (2018 13:00)  HR: 81 (2018 13:00) (71 - 87)  BP: 123/64 (2018 13:00) (96/52 - 123/64)  BP(mean): --  RR: 16 (2018 13:00) (16 - 17)  SpO2: 96% (2018 09:45) (96% - 96%)      18 @ 07:01  -  18 @ 07:00  --------------------------------------------------------  IN: 550 mL / OUT: 0 mL / NET: 550 mL    18 @ 07:01  -  18 @ 16:18  --------------------------------------------------------  IN: 350 mL / OUT: 400 mL / NET: -50 mL        PHYSICAL EXAM:  GENERAL: NAD  PSYCH: no agitation, baseline mentation  HEENT: no appreciable exudate; hoarse  NERVOUS SYSTEM:  Alert & Oriented X3, no new focal deficits  PULMONARY: subtle end expiratory wheezing with b/l coarse breathsounds  CARDIOVASCULAR: Regular rate and rhythm; No murmurs, rubs, or gallops  GI: Soft, Nontender, Nondistended; Bowel sounds present  EXTREMITIES:  2+ Peripheral Pulses, No clubbing, cyanosis, or edema    Consultant(s) Notes Reviewed:  [x ] YES  [ ] NO    Discussed with Consultants/Other Providers [ x] YES     LABS                          13.4   8.70  )-----------( 179      ( 2018 06:19 )             41.0     06-07    142  |  99  |  11  ----------------------------<  96  4.4   |  26  |  1.3    Ca    9.0      2018 06:19  Mg     1.9     06-07        Urinalysis Basic - ( 2018 15:45 )    Color: Yellow / Appearance: Clear / S.010 / pH: x  Gluc: x / Ketone: Negative  / Bili: Negative / Urobili: 0.2 mg/dL   Blood: x / Protein: Negative mg/dL / Nitrite: Negative   Leuk Esterase: Trace / RBC: x / WBC x   Sq Epi: x / Non Sq Epi: x / Bacteria: x        Lactate Trend   @ 17:59 Lactate:0.7         CAPILLARY BLOOD GLUCOSE            RADIOLOGY & ADDITIONAL TESTS:    Imaging Personally Reviewed:  [ ] YES  [ ] NO    HEALTH ISSUES - PROBLEM Dx:

## 2018-06-07 NOTE — PROGRESS NOTE ADULT - ASSESSMENT
48 y/o F with significant PMH of CHF s/p AICD+PPM, DVT on xarelto, COPD, RA, laryngeal cancer s/p excision in november, s/p  2 week history of esophageal thrush 4 weeks ago status post PO diflucan treatment.     # Worsening Laryngeal Candidiasis, s/p EGD 5/15/2018  modified diet  IV antifungal per ID recs  f/u LFTs    2. Throat Cancer s/p excision in Nov 2017; as per pt she was put on CellCept for her LUPUS 2 years ago by her Rheumatologist  and stopped taking it in Feb2018. She said she is not on any other chemotherapy.   hem/onc f/u    #possible TIAN  check ua/lytes  trend cr  if worsens may need to consider switching amphotericin  will renally adjust meds to gfr    3. COPD exacerbation - resolved  - c/w duonebs q4+prn,   - hold off on steroids   resume home breo, family to bring in    4. CHF s/p AICD+PPM:   - f/u EP consult - to interrogate device pending result, completed    5. HTN - stable  - c/w toprol, hydralazine    6 RA - controlled  - c/w outpt benlysta + methotrexate shots  - c/w folic acid supplementation    7. Chronic Pain 2/2 Multiple Back Sx  - c/w MS contin   - c/w dilaudid PRN  - c/w gabapentin    8. Anxiety  - c/w xanax PRN  - c/w trazodone    9. hyperkalemia - resolved    10. ckd 2 - controlled  - monitor BUN, Cr,     DVT PPX: Already  on xarelto.  GI PPx: protonix

## 2018-06-07 NOTE — PROGRESS NOTE ADULT - SUBJECTIVE AND OBJECTIVE BOX
SUBJECTIVE:    Patient is a 47y old  Female who presents with a chief complaint of worsening laryngeal candidiasis      Patient was seen at bedside this morning. Patient was in no acute distress but did complain of a feeling of blood in her throat and that she can "taste" blood.     PAST MEDICAL & SURGICAL HISTORY  PAST MEDICAL & SURGICAL HISTORY:  DVT (deep venous thrombosis)  COPD (chronic obstructive pulmonary disease)  CHF (congestive heart failure)  HTN (hypertension)  RA (rheumatoid arthritis)  Throat cancer  AICD (automatic cardioverter/defibrillator) present  Lupus  History of laryngeal cancer  S/P cholecystectomy  S/P appendectomy    SOCIAL HISTORY:    ALLERGIES:  Avelox (Other)  Plaquenil (Other)  Vantin (Other (Mild))    MEDICATIONS:  STANDING MEDICATIONS  ALBUTerol/ipratropium for Nebulization 3 milliLiter(s) Nebulizer every 4 hours  amphotericin B  liposome  IVPB 230 milliGRAM(s) IV Intermittent daily  docusate sodium 100 milliGRAM(s) Oral three times a day  folic acid 1 milliGRAM(s) Oral daily  gabapentin 300 milliGRAM(s) Oral two times a day  hydrALAZINE 50 milliGRAM(s) Oral every 8 hours  hydrOXYzine hydrochloride 50 milliGRAM(s) Oral two times a day  magnesium oxide 400 milliGRAM(s) Oral once  metoclopramide 10 milliGRAM(s) Oral three times a day before meals  metoprolol succinate ER 50 milliGRAM(s) Oral daily  morphine ER Tablet 200 milliGRAM(s) Oral three times a day  nystatin    Suspension 608766 Unit(s) Oral four times a day  pantoprazole    Tablet 40 milliGRAM(s) Oral before breakfast  rivaroxaban 20 milliGRAM(s) Oral every 24 hours  senna 2 Tablet(s) Oral at bedtime  traZODone 100 milliGRAM(s) Oral at bedtime    PRN MEDICATIONS  ALPRAZolam 1 milliGRAM(s) Oral <User Schedule> PRN  HYDROmorphone   Tablet 8 milliGRAM(s) Oral every 4 hours PRN    VITALS:   T(F): 98.3  HR: 87  BP: 110/86  RR: 17  SpO2: --    HOME MEDS: </u  Benlysta: 200 milligram(s) intravenous once a week  Breo Ellipta 100 mcg-25 mcg/inh inhalation powder: 1 puff(s) inhaled once a day  Dilaudid 8 mg oral tablet: 1 tab(s) orally every 4 hours, As Needed  folic acid 1 mg oral tablet: 1 tab(s) orally once a day  gabapentin 300 mg oral tablet: orally 2 times a day  hydrALAZINE 50 mg oral tablet: orally every 8 hours  methotrexate: injectable once a week  MS Contin 200 mg oral tablet, extended release: 1 tab(s) orally every 8 hours  Prevacid 30 mg oral delayed release capsule: 1 cap(s) orally once a day  ProAir HFA 90 mcg/inh inhalation aerosol: 2 puff(s) inhaled 4 times a day, As Needed  Reglan 10 mg oral tablet: 1 tab(s) orally 3 times a day (before meals)  Toprol-XL 50 mg oral tablet, extended release: 1 tab(s) orally once a day  traZODone 100 mg oral tablet: 1 tab(s) orally once a day (at bedtime)  Vistaril 50 mg oral capsule: orally 2 times a day  Vitamin B12 1000 mcg/mL injectable solution: injectable 2 times a week  Xanax 1 mg oral tablet: orally every 4 hours, As Needed  Xarelto 20 mg oral tablet: 1 tab(s) orally once a day (in the evening)      LABS:                        13.4   8.70  )-----------( 179      ( 07 Jun 2018 06:19 )             41.0     06-07    142  |  99  |  11  ----------------------------<  96  4.4   |  26  |  1.3    Ca    9.0      07 Jun 2018 06:19  Mg     1.9     06-07    Sedimentation Rate, Erythrocyte: 20 mm/hr <H> (06-06-18 @ 16:02)      Culture - Blood (collected 05 Jun 2018 06:50)  Source: .Blood None  Preliminary Report (06 Jun 2018 16:01):    No growth to date.    Culture - Blood (collected 05 Jun 2018 03:04)  Source: .Blood Blood  Preliminary Report (06 Jun 2018 06:02):    No growth to date.    Culture - Blood (collected 04 Jun 2018 17:59)  Source: .Blood Blood  Preliminary Report (06 Jun 2018 01:03):    No growth to date.    Culture - Blood (collected 04 Jun 2018 17:59)  Source: .Blood Blood  Preliminary Report (06 Jun 2018 01:03):    No growth to date.    RADIOLOGY:    PHYSICAL EXAM:  GEN: No acute distress  HEENT: No changes noted  LUNGS: Clear to auscultation bilaterally   HEART: S1/S2 present. RRR.   ABD: Soft, non-tender, non-distended. Bowel sounds present  EXT: no changes noted  NEURO: AAOX3

## 2018-06-07 NOTE — PROGRESS NOTE ADULT - ASSESSMENT
46 y/o F with significant PMH of CHF s/p AICD+PPM, DVT on xarelto, COPD, RA, laryngeal cancer s/p excision in november, s/p  2 week history of esophageal thrush 4 weeks ago status post PO diflucan treatment.     # Worsening Laryngeal Candidiasis, s/p EGD 5/15/2018  - c/w liposomal amphotericin 3mg/kg IV q24 as per ID recs  - f/u BMP, Cr, CBC daily  - Monitor Mg2+ and replete as needed  - check daily weights.   - c/w swish and swallow Nystatin as per ENT recs  - HIV negative (5/10)  - f/u CXR --> no ACS   - f/u blood cultures - negative    2. Throat Cancer s/p excision in Nov 2017; as per pt she was put on CellCept for her LUPUS 2 years ago by her Rheumatologist  and stopped taking it in Feb2018. She said she is not on any other chemotherapy.   - Heme/onc consult ordered --> f/u with her doctor outpatient; pt was seen by Heme/Onc () on last admission    3. COPD exacerbation - resolved  - c/w duonebs q4+prn,   - hold off on steroids     4. CHF s/p AICD+PPM:   - f/u EP consult to interrogate device ---> pending  - f/u echo; last echo in 2015 LVEF 55-60% --> pending    5. HTN - stable  - c/w toprol, hydralazine    6 RA - controlled  - c/w outpt benlysta + methotrexate shots  - c/w folic acid supplementation    7. Chronic Pain 2/2 Multiple Back Sx  - c/w MS contin   - c/w dilaudid PRN  - c/w gabapentin    8. Anxiety  - c/w xanax PRN  - c/w trazodone    9. hyperkalemia - resolved    10. ckd 2 - controlled  - monitor BUN, Cr,     DVT PPX: xarelto  GI PPx: protonix  Dispo: Home after medically stable 46 y/o F with significant PMH of CHF s/p AICD+PPM, DVT on xarelto, COPD, RA, laryngeal cancer s/p excision in november, s/p  2 week history of esophageal thrush 4 weeks ago status post PO diflucan treatment.     # Hemoptysis  - mild, continue to quantify sputum    # TIAN likely due to liposomal amphotericin  - continue to monitor BMP for Cr, if worsens recall ID and consider decreasing dose  - f/u urine studies     # Worsening Laryngeal Candidiasis, s/p EGD 5/15/2018  - c/w liposomal amphotericin 3mg/kg IV q24 as per ID recs  - f/u BMP, Cr, CBC daily  - Monitor Mg2+ and replete as needed  - check daily weights.   - c/w swish and swallow Nystatin as per ENT recs  - HIV negative (5/10)  - f/u CXR --> no ACS   - f/u blood cultures - negative    2. Throat Cancer s/p excision in Nov 2017; as per pt she was put on CellCept for her LUPUS 2 years ago by her Rheumatologist  and stopped taking it in Feb2018. She said she is not on any other chemotherapy.   - Heme/onc consult ordered --> f/u with her doctor outpatient; pt was seen by Heme/Onc () on last admission    3. COPD exacerbation - resolved  - c/w duonebs q4+prn,   - hold off on steroids     4. CHF s/p AICD+PPM:   - f/u EP consult to interrogate device ---> pending  - f/u echo; last echo in 2015 LVEF 55-60% --> pending    5. HTN - stable  - c/w toprol, hydralazine    6 RA - controlled  - c/w outpt benlysta + methotrexate shots  - c/w folic acid supplementation    7. Chronic Pain 2/2 Multiple Back Sx  - c/w MS contin   - c/w dilaudid PRN  - c/w gabapentin    8. Anxiety  - c/w xanax PRN  - c/w trazodone    9. hyperkalemia - resolved    10. ckd 2 - controlled  - monitor BUN, Cr,     DVT PPX: xarelto  GI PPx: protonix  Dispo: Home after medically stable

## 2018-06-08 LAB
ALBUMIN SERPL ELPH-MCNC: 3.4 G/DL — LOW (ref 3.5–5.2)
ALP SERPL-CCNC: 64 U/L — SIGNIFICANT CHANGE UP (ref 30–115)
ALT FLD-CCNC: 5 U/L — SIGNIFICANT CHANGE UP (ref 0–41)
ANION GAP SERPL CALC-SCNC: 11 MMOL/L — SIGNIFICANT CHANGE UP (ref 7–14)
AST SERPL-CCNC: 11 U/L — SIGNIFICANT CHANGE UP (ref 0–41)
BASOPHILS # BLD AUTO: 0.04 K/UL — SIGNIFICANT CHANGE UP (ref 0–0.2)
BASOPHILS NFR BLD AUTO: 0.5 % — SIGNIFICANT CHANGE UP (ref 0–1)
BILIRUB SERPL-MCNC: 0.5 MG/DL — SIGNIFICANT CHANGE UP (ref 0.2–1.2)
BUN SERPL-MCNC: 16 MG/DL — SIGNIFICANT CHANGE UP (ref 10–20)
CALCIUM SERPL-MCNC: 8.8 MG/DL — SIGNIFICANT CHANGE UP (ref 8.5–10.1)
CHLORIDE SERPL-SCNC: 100 MMOL/L — SIGNIFICANT CHANGE UP (ref 98–110)
CO2 SERPL-SCNC: 29 MMOL/L — SIGNIFICANT CHANGE UP (ref 17–32)
CREAT SERPL-MCNC: 1.5 MG/DL — SIGNIFICANT CHANGE UP (ref 0.7–1.5)
EOSINOPHIL # BLD AUTO: 0.21 K/UL — SIGNIFICANT CHANGE UP (ref 0–0.7)
EOSINOPHIL NFR BLD AUTO: 2.8 % — SIGNIFICANT CHANGE UP (ref 0–8)
GLUCOSE SERPL-MCNC: 117 MG/DL — HIGH (ref 70–99)
HCT VFR BLD CALC: 34.3 % — LOW (ref 37–47)
HGB BLD-MCNC: 11.2 G/DL — LOW (ref 12–16)
IMM GRANULOCYTES NFR BLD AUTO: 0.4 % — HIGH (ref 0.1–0.3)
LYMPHOCYTES # BLD AUTO: 2.24 K/UL — SIGNIFICANT CHANGE UP (ref 1.2–3.4)
LYMPHOCYTES # BLD AUTO: 30.2 % — SIGNIFICANT CHANGE UP (ref 20.5–51.1)
MAGNESIUM SERPL-MCNC: 1.8 MG/DL — SIGNIFICANT CHANGE UP (ref 1.8–2.4)
MCHC RBC-ENTMCNC: 28 PG — SIGNIFICANT CHANGE UP (ref 27–31)
MCHC RBC-ENTMCNC: 32.7 G/DL — SIGNIFICANT CHANGE UP (ref 32–37)
MCV RBC AUTO: 85.8 FL — SIGNIFICANT CHANGE UP (ref 81–99)
MONOCYTES # BLD AUTO: 0.95 K/UL — HIGH (ref 0.1–0.6)
MONOCYTES NFR BLD AUTO: 12.8 % — HIGH (ref 1.7–9.3)
NEUTROPHILS # BLD AUTO: 3.94 K/UL — SIGNIFICANT CHANGE UP (ref 1.4–6.5)
NEUTROPHILS NFR BLD AUTO: 53.3 % — SIGNIFICANT CHANGE UP (ref 42.2–75.2)
NRBC # BLD: 0 /100 WBCS — SIGNIFICANT CHANGE UP (ref 0–0)
PH UR: 5 — SIGNIFICANT CHANGE UP (ref 5–8)
PLATELET # BLD AUTO: 161 K/UL — SIGNIFICANT CHANGE UP (ref 130–400)
POTASSIUM SERPL-MCNC: 4.5 MMOL/L — SIGNIFICANT CHANGE UP (ref 3.5–5)
POTASSIUM SERPL-SCNC: 4.5 MMOL/L — SIGNIFICANT CHANGE UP (ref 3.5–5)
PROT SERPL-MCNC: 5.6 G/DL — LOW (ref 6–8)
RBC # BLD: 4 M/UL — LOW (ref 4.2–5.4)
RBC # FLD: 13.2 % — SIGNIFICANT CHANGE UP (ref 11.5–14.5)
SODIUM SERPL-SCNC: 140 MMOL/L — SIGNIFICANT CHANGE UP (ref 135–146)
URATE UR-MCNC: 29.1 MG/DL — SIGNIFICANT CHANGE UP
WBC # BLD: 7.41 K/UL — SIGNIFICANT CHANGE UP (ref 4.8–10.8)
WBC # FLD AUTO: 7.41 K/UL — SIGNIFICANT CHANGE UP (ref 4.8–10.8)

## 2018-06-08 RX ORDER — GABAPENTIN 400 MG/1
200 CAPSULE ORAL
Qty: 0 | Refills: 0 | Status: DISCONTINUED | OUTPATIENT
Start: 2018-06-08 | End: 2018-06-26

## 2018-06-08 RX ORDER — POSACONAZOLE 100 MG/1
300 TABLET, DELAYED RELEASE ORAL
Qty: 0 | Refills: 0 | Status: DISCONTINUED | OUTPATIENT
Start: 2018-06-08 | End: 2018-06-08

## 2018-06-08 RX ORDER — MICAFUNGIN SODIUM 100 MG/1
100 INJECTION, POWDER, LYOPHILIZED, FOR SOLUTION INTRAVENOUS EVERY 24 HOURS
Qty: 0 | Refills: 0 | Status: DISCONTINUED | OUTPATIENT
Start: 2018-06-08 | End: 2018-06-23

## 2018-06-08 RX ORDER — ITRACONAZOLE 10 MG/ML
1 SOLUTION ORAL
Qty: 14 | Refills: 0 | OUTPATIENT
Start: 2018-06-08 | End: 2018-06-14

## 2018-06-08 RX ORDER — HYDROXYZINE HCL 10 MG
25 TABLET ORAL
Qty: 0 | Refills: 0 | Status: DISCONTINUED | OUTPATIENT
Start: 2018-06-08 | End: 2018-06-26

## 2018-06-08 RX ORDER — IPRATROPIUM/ALBUTEROL SULFATE 18-103MCG
3 AEROSOL WITH ADAPTER (GRAM) INHALATION EVERY 6 HOURS
Qty: 0 | Refills: 0 | Status: DISCONTINUED | OUTPATIENT
Start: 2018-06-08 | End: 2018-06-16

## 2018-06-08 RX ORDER — FLUTICASONE FUROATE AND VILANTEROL TRIFENATATE 100; 25 UG/1; UG/1
1 POWDER RESPIRATORY (INHALATION) DAILY
Qty: 0 | Refills: 0 | Status: DISCONTINUED | OUTPATIENT
Start: 2018-06-08 | End: 2018-06-26

## 2018-06-08 RX ADMIN — Medication 50 MILLIGRAM(S): at 06:14

## 2018-06-08 RX ADMIN — Medication 500000 UNIT(S): at 05:43

## 2018-06-08 RX ADMIN — HYDROMORPHONE HYDROCHLORIDE 8 MILLIGRAM(S): 2 INJECTION INTRAMUSCULAR; INTRAVENOUS; SUBCUTANEOUS at 12:44

## 2018-06-08 RX ADMIN — MORPHINE SULFATE 200 MILLIGRAM(S): 50 CAPSULE, EXTENDED RELEASE ORAL at 15:11

## 2018-06-08 RX ADMIN — Medication 50 MILLIGRAM(S): at 15:11

## 2018-06-08 RX ADMIN — Medication 500000 UNIT(S): at 23:00

## 2018-06-08 RX ADMIN — Medication 100 MILLIGRAM(S): at 22:55

## 2018-06-08 RX ADMIN — HYDROMORPHONE HYDROCHLORIDE 8 MILLIGRAM(S): 2 INJECTION INTRAMUSCULAR; INTRAVENOUS; SUBCUTANEOUS at 18:40

## 2018-06-08 RX ADMIN — MORPHINE SULFATE 200 MILLIGRAM(S): 50 CAPSULE, EXTENDED RELEASE ORAL at 06:13

## 2018-06-08 RX ADMIN — Medication 50 MILLIGRAM(S): at 06:15

## 2018-06-08 RX ADMIN — GABAPENTIN 300 MILLIGRAM(S): 400 CAPSULE ORAL at 05:41

## 2018-06-08 RX ADMIN — Medication 3 MILLILITER(S): at 15:45

## 2018-06-08 RX ADMIN — MICAFUNGIN SODIUM 105 MILLIGRAM(S): 100 INJECTION, POWDER, LYOPHILIZED, FOR SOLUTION INTRAVENOUS at 17:44

## 2018-06-08 RX ADMIN — MORPHINE SULFATE 200 MILLIGRAM(S): 50 CAPSULE, EXTENDED RELEASE ORAL at 22:55

## 2018-06-08 RX ADMIN — Medication 10 MILLIGRAM(S): at 17:46

## 2018-06-08 RX ADMIN — Medication 25 MILLIGRAM(S): at 17:46

## 2018-06-08 RX ADMIN — Medication 3 MILLILITER(S): at 07:42

## 2018-06-08 RX ADMIN — Medication 50 MILLIGRAM(S): at 22:55

## 2018-06-08 RX ADMIN — Medication 100 MILLIGRAM(S): at 22:56

## 2018-06-08 RX ADMIN — Medication 10 MILLIGRAM(S): at 06:14

## 2018-06-08 RX ADMIN — HYDROMORPHONE HYDROCHLORIDE 8 MILLIGRAM(S): 2 INJECTION INTRAMUSCULAR; INTRAVENOUS; SUBCUTANEOUS at 17:44

## 2018-06-08 RX ADMIN — MORPHINE SULFATE 200 MILLIGRAM(S): 50 CAPSULE, EXTENDED RELEASE ORAL at 16:49

## 2018-06-08 RX ADMIN — MORPHINE SULFATE 200 MILLIGRAM(S): 50 CAPSULE, EXTENDED RELEASE ORAL at 05:43

## 2018-06-08 RX ADMIN — FLUTICASONE FUROATE AND VILANTEROL TRIFENATATE 1 PUFF(S): 100; 25 POWDER RESPIRATORY (INHALATION) at 17:47

## 2018-06-08 RX ADMIN — Medication 1 MILLIGRAM(S): at 12:45

## 2018-06-08 RX ADMIN — Medication 1 MILLIGRAM(S): at 06:15

## 2018-06-08 RX ADMIN — Medication 50 MILLIGRAM(S): at 05:42

## 2018-06-08 RX ADMIN — Medication 100 MILLIGRAM(S): at 05:41

## 2018-06-08 RX ADMIN — Medication 1 MILLIGRAM(S): at 22:55

## 2018-06-08 RX ADMIN — PANTOPRAZOLE SODIUM 40 MILLIGRAM(S): 20 TABLET, DELAYED RELEASE ORAL at 06:14

## 2018-06-08 RX ADMIN — Medication 100 MILLIGRAM(S): at 15:12

## 2018-06-08 RX ADMIN — HYDROMORPHONE HYDROCHLORIDE 8 MILLIGRAM(S): 2 INJECTION INTRAMUSCULAR; INTRAVENOUS; SUBCUTANEOUS at 13:40

## 2018-06-08 RX ADMIN — HYDROMORPHONE HYDROCHLORIDE 8 MILLIGRAM(S): 2 INJECTION INTRAMUSCULAR; INTRAVENOUS; SUBCUTANEOUS at 23:26

## 2018-06-08 RX ADMIN — HYDROMORPHONE HYDROCHLORIDE 8 MILLIGRAM(S): 2 INJECTION INTRAMUSCULAR; INTRAVENOUS; SUBCUTANEOUS at 22:55

## 2018-06-08 RX ADMIN — Medication 10 MILLIGRAM(S): at 12:45

## 2018-06-08 RX ADMIN — Medication 500000 UNIT(S): at 12:45

## 2018-06-08 RX ADMIN — SENNA PLUS 2 TABLET(S): 8.6 TABLET ORAL at 22:56

## 2018-06-08 RX ADMIN — GABAPENTIN 200 MILLIGRAM(S): 400 CAPSULE ORAL at 17:45

## 2018-06-08 RX ADMIN — MORPHINE SULFATE 200 MILLIGRAM(S): 50 CAPSULE, EXTENDED RELEASE ORAL at 23:27

## 2018-06-08 RX ADMIN — Medication 1 MILLIGRAM(S): at 17:49

## 2018-06-08 RX ADMIN — Medication 500000 UNIT(S): at 17:46

## 2018-06-08 NOTE — PROGRESS NOTE ADULT - ASSESSMENT
48 y/o F with significant PMH of CHF s/p AICD+PPM, DVT on xarelto, COPD, RA, laryngeal cancer s/p excision in november, s/p  2 week history of esophageal thrush 4 weeks ago status post PO diflucan treatment.     # Hemoptysis - none today (6/8)   - mild, continue to quantify sputum    # TIAN likely due to liposomal amphotericin  - c/w to monitor BMP for Cr,   - f/u ID for worsening Cr ---> pending  - f/u urine studies     # Worsening Laryngeal Candidiasis, s/p EGD 5/15/2018  - c/w liposomal amphotericin 3mg/kg IV q24 as per ID recs  - f/u BMP, Cr, CBC daily  - f/u ID for worsening Cr ---> pending  - Monitor Mg2+ and replete as needed  - check daily weights.   - c/w swish and swallow Nystatin as per ENT recs  - HIV negative (5/10)  - f/u CXR --> no ACS   - f/u blood cultures - negative    # Throat Cancer s/p excision in Nov 2017; as per pt she was put on CellCept for her LUPUS 2 years ago by her Rheumatologist  and stopped taking it in Feb2018. She said she is not on any other chemotherapy.   - Heme/onc consult ordered --> f/u with her doctor outpatient; pt was seen by Heme/Onc () on last admission    # COPD exacerbation - resolved  - c/w duonebs q4+prn,   - hold off on steroids     # CHF s/p AICD+PPM:   - EP saw pt yesterday, will call EP to follow up   - f/u echo; last echo in 2015 LVEF 55-60% --> LVEF 65%    # HTN - stable  - c/w toprol, hydralazine    # RA - controlled  - c/w outpt benlysta + methotrexate shots  - c/w folic acid supplementation    # Chronic Pain 2/2 Multiple Back Sx  - c/w MS contin   - c/w dilaudid PRN  - c/w gabapentin    # Anxiety  - c/w xanax PRN  - c/w trazodone    # hyperkalemia - resolved    # ckd 2 - controlled  - monitor BUN, Cr    DVT PPX: xarelto  GI PPx: protonix  Dispo: Home after medically stable 48 y/o F with significant PMH of CHF s/p AICD+PPM, DVT on xarelto, COPD, RA, laryngeal cancer s/p excision in november, s/p  2 week history of esophageal thrush 4 weeks ago status post PO diflucan treatment.     # Hemoptysis - none today (6/8)   - mild, continue to quantify sputum    # TIAN likely due to liposomal amphotericin  - c/w to monitor BMP for Cr,   - f/u ID for worsening Cr ---> change to IV micafungin while inpatient, Itraconazole 200mg PO BID x 7D outpatient  - f/u urine studies     # Worsening Laryngeal Candidiasis, s/p EGD 5/15/2018  - c/w liposomal amphotericin 3mg/kg IV q24 as per ID recs  - f/u BMP, Cr, CBC daily  - f/u ID for worsening Cr ---> pending  - Monitor Mg2+ and replete as needed  - check daily weights.   - c/w swish and swallow Nystatin as per ENT recs  - HIV negative (5/10)  - f/u CXR --> no ACS   - f/u blood cultures - negative    # Throat Cancer s/p excision in Nov 2017; as per pt she was put on CellCept for her LUPUS 2 years ago by her Rheumatologist  and stopped taking it in Feb2018. She said she is not on any other chemotherapy.   - Heme/onc consult ordered --> f/u with her doctor outpatient; pt was seen by Heme/Onc () on last admission    # COPD exacerbation - resolved  - c/w duonebs q4+prn,   - hold off on steroids     # CHF s/p AICD+PPM:   - EP saw pt yesterday, will call EP to follow up   - f/u echo; last echo in 2015 LVEF 55-60% --> LVEF 65%    # HTN - stable  - c/w toprol, hydralazine    # RA - controlled  - c/w outpt benlysta + methotrexate shots  - c/w folic acid supplementation    # Chronic Pain 2/2 Multiple Back Sx  - c/w MS contin   - c/w dilaudid PRN  - c/w gabapentin    # Anxiety  - c/w xanax PRN  - c/w trazodone    # hyperkalemia - resolved    # ckd 2 - controlled  - monitor BUN, Cr    DVT PPX: xarelto  GI PPx: protonix  Dispo: Home after medically stable

## 2018-06-08 NOTE — PROGRESS NOTE ADULT - SUBJECTIVE AND OBJECTIVE BOX
SUBJECTIVE:    Patient is a 47y old  Female who presents with a chief complaint of worsening laryngeal candidiasis     Today is hospital day 4d. Patient was seen at bedside this morning. Patient was in no acute distress but did complain of weakness and throat pain. Patient denies nausea, vomiting.     PAST MEDICAL & SURGICAL HISTORY  PAST MEDICAL & SURGICAL HISTORY:  DVT (deep venous thrombosis)  COPD (chronic obstructive pulmonary disease)  CHF (congestive heart failure)  HTN (hypertension)  RA (rheumatoid arthritis)  Throat cancer  AICD (automatic cardioverter/defibrillator) present  Lupus  History of laryngeal cancer  S/P cholecystectomy  S/P appendectomy    SOCIAL HISTORY:    ALLERGIES:  Avelox (Other)  Plaquenil (Other)  Vantin (Other (Mild))    MEDICATIONS:  STANDING MEDICATIONS  ALBUTerol/ipratropium for Nebulization 3 milliLiter(s) Nebulizer every 4 hours  amphotericin B  liposome  IVPB 230 milliGRAM(s) IV Intermittent daily  docusate sodium 100 milliGRAM(s) Oral three times a day  folic acid 1 milliGRAM(s) Oral daily  gabapentin 300 milliGRAM(s) Oral two times a day  hydrALAZINE 50 milliGRAM(s) Oral every 8 hours  hydrOXYzine hydrochloride 50 milliGRAM(s) Oral two times a day  metoclopramide 10 milliGRAM(s) Oral three times a day before meals  metoprolol succinate ER 50 milliGRAM(s) Oral daily  morphine ER Tablet 200 milliGRAM(s) Oral three times a day  nystatin    Suspension 430172 Unit(s) Oral four times a day  pantoprazole    Tablet 40 milliGRAM(s) Oral before breakfast  senna 2 Tablet(s) Oral at bedtime  traZODone 100 milliGRAM(s) Oral at bedtime    PRN MEDICATIONS  ALPRAZolam 1 milliGRAM(s) Oral <User Schedule> PRN  HYDROmorphone   Tablet 8 milliGRAM(s) Oral every 4 hours PRN    VITALS:   T(F): 98.7  HR: 90  BP: 127/59  RR: 17  SpO2: 96%    HOME MEDS: </u  Benlysta: 200 milligram(s) intravenous once a week  Breo Ellipta 100 mcg-25 mcg/inh inhalation powder: 1 puff(s) inhaled once a day  Dilaudid 8 mg oral tablet: 1 tab(s) orally every 4 hours, As Needed  folic acid 1 mg oral tablet: 1 tab(s) orally once a day  gabapentin 300 mg oral tablet: orally 2 times a day  hydrALAZINE 50 mg oral tablet: orally every 8 hours  methotrexate: injectable once a week  MS Contin 200 mg oral tablet, extended release: 1 tab(s) orally every 8 hours  Prevacid 30 mg oral delayed release capsule: 1 cap(s) orally once a day  ProAir HFA 90 mcg/inh inhalation aerosol: 2 puff(s) inhaled 4 times a day, As Needed  Reglan 10 mg oral tablet: 1 tab(s) orally 3 times a day (before meals)  Toprol-XL 50 mg oral tablet, extended release: 1 tab(s) orally once a day  traZODone 100 mg oral tablet: 1 tab(s) orally once a day (at bedtime)  Vistaril 50 mg oral capsule: orally 2 times a day  Vitamin B12 1000 mcg/mL injectable solution: injectable 2 times a week  Xanax 1 mg oral tablet: orally every 4 hours, As Needed  Xarelto 20 mg oral tablet: 1 tab(s) orally once a day (in the evening)      LABS:                        11.2   7.41  )-----------( 161      ( 2018 05:20 )             34.3     06-08    140  |  100  |  16  ----------------------------<  117<H>  4.5   |  29  |  1.5    Ca    8.8      2018 05:20  Mg     1.8         TPro  5.6<L>  /  Alb  3.4<L>  /  TBili  0.5  /  DBili  x   /  AST  11  /  ALT  5   /  AlkPhos  64        Urinalysis Basic - ( 2018 15:45 )    Color: Yellow / Appearance: Clear / S.010 / pH: x  Gluc: x / Ketone: Negative  / Bili: Negative / Urobili: 0.2 mg/dL   Blood: x / Protein: Negative mg/dL / Nitrite: Negative   Leuk Esterase: Trace / RBC: x / WBC x   Sq Epi: x / Non Sq Epi: x / Bacteria: Few /HPF    RADIOLOGY:    PHYSICAL EXAM:  GEN: No acute distress  HEENT: minor erythematous oral mucosa, white spot in the left oral mucosa  LUNGS: Clear to auscultation bilaterally   HEART: S1/S2 present. RRR.   ABD: Soft, non-tender, non-distended. Bowel sounds present  EXT: No changes noted.   NEURO: AAOX3

## 2018-06-08 NOTE — PROGRESS NOTE ADULT - ASSESSMENT
48 y/o F with significant PMH of CHF s/p AICD+PPM, DVT on xarelto, COPD, RA, laryngeal cancer s/p excision in november, s/p  2 week history of esophageal thrush 4 weeks ago status post PO diflucan treatment.     # Worsening Laryngeal Candidiasis, s/p EGD 5/15/2018  modified diet  IV antifungal per ID recs  changing amphotericin 2/2 renal dysfunction    2. Throat Cancer s/p excision in Nov 2017; as per pt she was put on CellCept for her LUPUS 2 years ago by her Rheumatologist  and stopped taking it in Feb2018. She said she is not on any other chemotherapy.   hem/onc f/u    #possible TIAN  check ua/lytes  trend cr  renally adjusting meds  holding home AC, repeat cr in am, if improves will resume home AC for prior DVT    3. COPD exacerbation - resolved  - c/w duonebs q4+prn,   - hold off on steroids   resume home breo, family to bring in    4. CHF s/p AICD+PPM:   device interrogation    5. HTN - stable  - c/w toprol, hydralazine    6 RA - controlled  - c/w outpt benlysta + methotrexate shots  - c/w folic acid supplementation    7. Chronic Pain 2/2 Multiple Back Sx  - c/w MS contin   - c/w dilaudid PRN  - c/w gabapentin    8. Anxiety  - c/w xanax PRN  - c/w trazodone    9. hyperkalemia - resolved    10. ckd 2 - controlled  - monitor BUN, Cr,     DVT PPX: holding therapeutic xarelto today for decreased gfr, may need to heparinize patient if no improvement tomorrow  GI PPx: protonix

## 2018-06-08 NOTE — PROGRESS NOTE ADULT - SUBJECTIVE AND OBJECTIVE BOX
ILEANA MCNEIL  47y, Female      OVERNIGHT EVENTS:    hoarseness slightly better.    VITALS:  T(F): 98.7, Max: 98.7 (18 @ 05:55)  HR: 90  BP: 127/59  RR: 17Vital Signs Last 24 Hrs  T(C): 37.1 (2018 05:55), Max: 37.1 (2018 05:55)  T(F): 98.7 (2018 05:55), Max: 98.7 (2018 05:55)  HR: 90 (2018 06:10) (74 - 90)  BP: 127/59 (2018 06:10) (92/51 - 127/59)  BP(mean): --  RR: 17 (2018 05:55) (16 - 17)  SpO2: 95% (2018 09:57) (95% - 95%)    TESTS & MEASUREMENTS:                        11.2   7.41  )-----------( 161      ( 2018 05:20 )             34.3     06-08    140  |  100  |  16  ----------------------------<  117<H>  4.5   |  29  |  1.5    Ca    8.8      2018 05:20  Mg     1.8     -08    TPro  5.6<L>  /  Alb  3.4<L>  /  TBili  0.5  /  DBili  x   /  AST  11  /  ALT  5   /  AlkPhos  64  06-08    LIVER FUNCTIONS - ( 2018 05:20 )  Alb: 3.4 g/dL / Pro: 5.6 g/dL / ALK PHOS: 64 U/L / ALT: 5 U/L / AST: 11 U/L / GGT: x             Culture - Blood (collected 18 @ 06:50)  Source: .Blood None  Preliminary Report (18 @ 16:01):    No growth to date.    Culture - Blood (collected 18 @ 03:04)  Source: .Blood Blood  Preliminary Report (18 @ 06:02):    No growth to date.    Culture - Blood (collected 18 @ 17:59)  Source: .Blood Blood  Preliminary Report (18 @ 01:03):    No growth to date.    Culture - Blood (collected 18 @ 17:59)  Source: .Blood Blood  Preliminary Report (18 @ 01:03):    No growth to date.      Urinalysis Basic - ( 2018 15:45 )    Color: Yellow / Appearance: Clear / S.010 / pH: x  Gluc: x / Ketone: Negative  / Bili: Negative / Urobili: 0.2 mg/dL   Blood: x / Protein: Negative mg/dL / Nitrite: Negative   Leuk Esterase: Trace / RBC: x / WBC x   Sq Epi: x / Non Sq Epi: x / Bacteria: Few /HPF          RADIOLOGY & ADDITIONAL TESTS:    ANTIBIOTICS:  amphotericin B  liposome  IVPB 230 milliGRAM(s) IV Intermittent daily  nystatin    Suspension 720007 Unit(s) Oral four times a day

## 2018-06-08 NOTE — PROGRESS NOTE ADULT - SUBJECTIVE AND OBJECTIVE BOX
ILEANA MCNEIL  47y  Female      Patient is a 47y old  Female who presents with a chief complaint of worsening laryngeal candidiasis (2018 20:08)      INTERVAL HPI/OVERNIGHT EVENTS: still c/o hoarseness and sore throat but swallowing and breathing well. no further significant hemoptysis reported      REVIEW OF SYSTEMS:  as above  All other review of systems negative    T(C): 37.1 (18 @ 13:00), Max: 37.1 (18 @ 05:55)  HR: 82 (18 @ 13:00) (74 - 90)  BP: 124/53 (18 @ 13:00) (92/51 - 127/59)  RR: 16 (18 @ 13:00) (16 - 17)  SpO2: 95% (18 @ 09:57) (95% - 95%)  Wt(kg): --Vital Signs Last 24 Hrs  T(C): 37.1 (2018 13:00), Max: 37.1 (2018 05:55)  T(F): 98.7 (2018 13:00), Max: 98.7 (2018 05:55)  HR: 82 (2018 13:00) (74 - 90)  BP: 124/53 (2018 13:00) (92/51 - 127/59)  BP(mean): --  RR: 16 (2018 13:00) (16 - 17)  SpO2: 95% (2018 09:57) (95% - 95%)      18 @ 07:01  -  18 @ 07:00  --------------------------------------------------------  IN: 350 mL / OUT: 700 mL / NET: -350 mL    18 @ 07:01  -  18 @ 17:13  --------------------------------------------------------  IN: 750 mL / OUT: 600 mL / NET: 150 mL        PHYSICAL EXAM:  GENERAL: NAD  PSYCH: no agitation, baseline mentation  HEENT: small satellite lesion L posterior pharynx; hoarse  NERVOUS SYSTEM:  Alert & Oriented X3, no new focal deficits  PULMONARY: Clear to percussion bilaterally; No rales, rhonchi, wheezing, or rubs  CARDIOVASCULAR: Regular rate and rhythm; No murmurs, rubs, or gallops  GI: Soft, Nontender, Nondistended; Bowel sounds present  EXTREMITIES:  2+ Peripheral Pulses, No clubbing, cyanosis, or edema    Consultant(s) Notes Reviewed:  [x ] YES  [ ] NO    Discussed with Consultants/Other Providers [ x] YES     LABS                          11.2   7.41  )-----------( 161      ( 2018 05:20 )             34.3     06-08    140  |  100  |  16  ----------------------------<  117<H>  4.5   |  29  |  1.5    Ca    8.8      2018 05:20  Mg     1.8     -08    TPro  5.6<L>  /  Alb  3.4<L>  /  TBili  0.5  /  DBili  x   /  AST  11  /  ALT  5   /  AlkPhos  64  06-08      Urinalysis Basic - ( 2018 15:45 )    Color: Yellow / Appearance: Clear / S.010 / pH: x  Gluc: x / Ketone: Negative  / Bili: Negative / Urobili: 0.2 mg/dL   Blood: x / Protein: Negative mg/dL / Nitrite: Negative   Leuk Esterase: Trace / RBC: x / WBC x   Sq Epi: x / Non Sq Epi: x / Bacteria: Few /HPF        Lactate Trend   @ 17:59 Lactate:0.7         CAPILLARY BLOOD GLUCOSE            RADIOLOGY & ADDITIONAL TESTS:    Imaging Personally Reviewed:  [ ] YES  [ ] NO    HEALTH ISSUES - PROBLEM Dx:

## 2018-06-08 NOTE — PROGRESS NOTE ADULT - ASSESSMENT
IMPRESSION:  Fungal laryngitis.  Worsening drug induced renal function.    RECOMMENDATIONS:  D/C ambisome.  Posaconzole po 300 mg q12 for 24h then change to 300 mg po q24h for 10 days.    Recall prn please.

## 2018-06-09 LAB
ANION GAP SERPL CALC-SCNC: 11 MMOL/L — SIGNIFICANT CHANGE UP (ref 7–14)
APTT BLD: 158.9 SEC — CRITICAL HIGH (ref 27–39.2)
BASOPHILS # BLD AUTO: 0.04 K/UL — SIGNIFICANT CHANGE UP (ref 0–0.2)
BASOPHILS NFR BLD AUTO: 0.6 % — SIGNIFICANT CHANGE UP (ref 0–1)
BUN SERPL-MCNC: 22 MG/DL — HIGH (ref 10–20)
CALCIUM SERPL-MCNC: 8.6 MG/DL — SIGNIFICANT CHANGE UP (ref 8.5–10.1)
CHLORIDE SERPL-SCNC: 97 MMOL/L — LOW (ref 98–110)
CO2 SERPL-SCNC: 29 MMOL/L — SIGNIFICANT CHANGE UP (ref 17–32)
CREAT SERPL-MCNC: 1.7 MG/DL — HIGH (ref 0.7–1.5)
EOSINOPHIL # BLD AUTO: 0.24 K/UL — SIGNIFICANT CHANGE UP (ref 0–0.7)
EOSINOPHIL NFR BLD AUTO: 3.4 % — SIGNIFICANT CHANGE UP (ref 0–8)
GLUCOSE SERPL-MCNC: 97 MG/DL — SIGNIFICANT CHANGE UP (ref 70–99)
HCT VFR BLD CALC: 33.7 % — LOW (ref 37–47)
HGB BLD-MCNC: 11 G/DL — LOW (ref 12–16)
IMM GRANULOCYTES NFR BLD AUTO: 0.4 % — HIGH (ref 0.1–0.3)
LYMPHOCYTES # BLD AUTO: 2.3 K/UL — SIGNIFICANT CHANGE UP (ref 1.2–3.4)
LYMPHOCYTES # BLD AUTO: 32.4 % — SIGNIFICANT CHANGE UP (ref 20.5–51.1)
MAGNESIUM SERPL-MCNC: 1.8 MG/DL — SIGNIFICANT CHANGE UP (ref 1.8–2.4)
MCHC RBC-ENTMCNC: 28 PG — SIGNIFICANT CHANGE UP (ref 27–31)
MCHC RBC-ENTMCNC: 32.6 G/DL — SIGNIFICANT CHANGE UP (ref 32–37)
MCV RBC AUTO: 85.8 FL — SIGNIFICANT CHANGE UP (ref 81–99)
MONOCYTES # BLD AUTO: 1.1 K/UL — HIGH (ref 0.1–0.6)
MONOCYTES NFR BLD AUTO: 15.5 % — HIGH (ref 1.7–9.3)
NEUTROPHILS # BLD AUTO: 3.39 K/UL — SIGNIFICANT CHANGE UP (ref 1.4–6.5)
NEUTROPHILS NFR BLD AUTO: 47.7 % — SIGNIFICANT CHANGE UP (ref 42.2–75.2)
NRBC # BLD: 0 /100 WBCS — SIGNIFICANT CHANGE UP (ref 0–0)
PLATELET # BLD AUTO: 169 K/UL — SIGNIFICANT CHANGE UP (ref 130–400)
POTASSIUM SERPL-MCNC: 4.5 MMOL/L — SIGNIFICANT CHANGE UP (ref 3.5–5)
POTASSIUM SERPL-SCNC: 4.5 MMOL/L — SIGNIFICANT CHANGE UP (ref 3.5–5)
RBC # BLD: 3.93 M/UL — LOW (ref 4.2–5.4)
RBC # FLD: 13.2 % — SIGNIFICANT CHANGE UP (ref 11.5–14.5)
SODIUM SERPL-SCNC: 137 MMOL/L — SIGNIFICANT CHANGE UP (ref 135–146)
WBC # BLD: 7.1 K/UL — SIGNIFICANT CHANGE UP (ref 4.8–10.8)
WBC # FLD AUTO: 7.1 K/UL — SIGNIFICANT CHANGE UP (ref 4.8–10.8)

## 2018-06-09 RX ORDER — SODIUM CHLORIDE 9 MG/ML
1000 INJECTION INTRAMUSCULAR; INTRAVENOUS; SUBCUTANEOUS
Qty: 0 | Refills: 0 | Status: DISCONTINUED | OUTPATIENT
Start: 2018-06-09 | End: 2018-06-11

## 2018-06-09 RX ORDER — HEPARIN SODIUM 5000 [USP'U]/ML
1200 INJECTION INTRAVENOUS; SUBCUTANEOUS
Qty: 25000 | Refills: 0 | Status: DISCONTINUED | OUTPATIENT
Start: 2018-06-09 | End: 2018-06-09

## 2018-06-09 RX ORDER — HEPARIN SODIUM 5000 [USP'U]/ML
1100 INJECTION INTRAVENOUS; SUBCUTANEOUS
Qty: 25000 | Refills: 0 | Status: DISCONTINUED | OUTPATIENT
Start: 2018-06-09 | End: 2018-06-11

## 2018-06-09 RX ADMIN — MORPHINE SULFATE 200 MILLIGRAM(S): 50 CAPSULE, EXTENDED RELEASE ORAL at 22:26

## 2018-06-09 RX ADMIN — MORPHINE SULFATE 200 MILLIGRAM(S): 50 CAPSULE, EXTENDED RELEASE ORAL at 15:00

## 2018-06-09 RX ADMIN — HYDROMORPHONE HYDROCHLORIDE 8 MILLIGRAM(S): 2 INJECTION INTRAMUSCULAR; INTRAVENOUS; SUBCUTANEOUS at 22:27

## 2018-06-09 RX ADMIN — MORPHINE SULFATE 200 MILLIGRAM(S): 50 CAPSULE, EXTENDED RELEASE ORAL at 06:55

## 2018-06-09 RX ADMIN — Medication 50 MILLIGRAM(S): at 21:41

## 2018-06-09 RX ADMIN — Medication 1 MILLIGRAM(S): at 08:05

## 2018-06-09 RX ADMIN — FLUTICASONE FUROATE AND VILANTEROL TRIFENATATE 1 PUFF(S): 100; 25 POWDER RESPIRATORY (INHALATION) at 08:06

## 2018-06-09 RX ADMIN — SENNA PLUS 2 TABLET(S): 8.6 TABLET ORAL at 21:41

## 2018-06-09 RX ADMIN — Medication 100 MILLIGRAM(S): at 21:41

## 2018-06-09 RX ADMIN — Medication 10 MILLIGRAM(S): at 11:12

## 2018-06-09 RX ADMIN — Medication 100 MILLIGRAM(S): at 13:29

## 2018-06-09 RX ADMIN — Medication 500000 UNIT(S): at 06:23

## 2018-06-09 RX ADMIN — PANTOPRAZOLE SODIUM 40 MILLIGRAM(S): 20 TABLET, DELAYED RELEASE ORAL at 06:24

## 2018-06-09 RX ADMIN — HEPARIN SODIUM 12 UNIT(S)/HR: 5000 INJECTION INTRAVENOUS; SUBCUTANEOUS at 12:48

## 2018-06-09 RX ADMIN — Medication 50 MILLIGRAM(S): at 08:06

## 2018-06-09 RX ADMIN — Medication 100 MILLIGRAM(S): at 06:24

## 2018-06-09 RX ADMIN — Medication 10 MILLIGRAM(S): at 16:46

## 2018-06-09 RX ADMIN — MORPHINE SULFATE 200 MILLIGRAM(S): 50 CAPSULE, EXTENDED RELEASE ORAL at 13:28

## 2018-06-09 RX ADMIN — Medication 1 MILLIGRAM(S): at 11:12

## 2018-06-09 RX ADMIN — Medication 500000 UNIT(S): at 17:55

## 2018-06-09 RX ADMIN — GABAPENTIN 200 MILLIGRAM(S): 400 CAPSULE ORAL at 17:54

## 2018-06-09 RX ADMIN — Medication 50 MILLIGRAM(S): at 13:28

## 2018-06-09 RX ADMIN — Medication 500000 UNIT(S): at 11:12

## 2018-06-09 RX ADMIN — HYDROMORPHONE HYDROCHLORIDE 8 MILLIGRAM(S): 2 INJECTION INTRAMUSCULAR; INTRAVENOUS; SUBCUTANEOUS at 17:45

## 2018-06-09 RX ADMIN — HYDROMORPHONE HYDROCHLORIDE 8 MILLIGRAM(S): 2 INJECTION INTRAMUSCULAR; INTRAVENOUS; SUBCUTANEOUS at 16:46

## 2018-06-09 RX ADMIN — Medication 10 MILLIGRAM(S): at 06:24

## 2018-06-09 RX ADMIN — Medication 25 MILLIGRAM(S): at 06:24

## 2018-06-09 RX ADMIN — MICAFUNGIN SODIUM 105 MILLIGRAM(S): 100 INJECTION, POWDER, LYOPHILIZED, FOR SOLUTION INTRAVENOUS at 17:54

## 2018-06-09 RX ADMIN — MORPHINE SULFATE 200 MILLIGRAM(S): 50 CAPSULE, EXTENDED RELEASE ORAL at 06:26

## 2018-06-09 RX ADMIN — Medication 25 MILLIGRAM(S): at 17:54

## 2018-06-09 RX ADMIN — HYDROMORPHONE HYDROCHLORIDE 8 MILLIGRAM(S): 2 INJECTION INTRAMUSCULAR; INTRAVENOUS; SUBCUTANEOUS at 21:42

## 2018-06-09 RX ADMIN — SODIUM CHLORIDE 75 MILLILITER(S): 9 INJECTION INTRAMUSCULAR; INTRAVENOUS; SUBCUTANEOUS at 11:11

## 2018-06-09 RX ADMIN — Medication 1 MILLIGRAM(S): at 21:42

## 2018-06-09 RX ADMIN — MORPHINE SULFATE 200 MILLIGRAM(S): 50 CAPSULE, EXTENDED RELEASE ORAL at 21:41

## 2018-06-09 RX ADMIN — GABAPENTIN 200 MILLIGRAM(S): 400 CAPSULE ORAL at 06:24

## 2018-06-09 NOTE — PROGRESS NOTE ADULT - ASSESSMENT
46 y/o F with significant PMH of CHF s/p AICD+PPM, DVT on xarelto, COPD, RA, laryngeal cancer s/p excision in november, s/p  2 week history of esophageal thrush 4 weeks ago status post PO diflucan treatment.     # Worsening Laryngeal Candidiasis, s/p EGD 5/15/2018  modified diet  changed to IV micafungin with ID guidance    2. Throat Cancer s/p excision in Nov 2017; as per pt she was put on CellCept for her LUPUS 2 years ago by her Rheumatologist  and stopped taking it in Feb2018. She said she is not on any other chemotherapy.   hem/onc f/u    TIAN on CKD2  fena consistent with intrinsic renal injury  gently hydrate, renal c/s  amphotericin stopped yesterday    3. COPD exacerbation - resolved  - c/w duonebs q4+prn,   - hold off on steroids   resume home breo, family to bring in    4. CHF s/p AICD+PPM:   device interrogation    5. HTN - stable  - c/w toprol, hydralazine    6 RA - controlled  - c/w outpt benlysta + methotrexate shots  - c/w folic acid supplementation    7. Chronic Pain 2/2 Multiple Back Sx  - c/w MS contin   - c/w dilaudid PRN  - c/w gabapentin    8. Anxiety  - c/w xanax PRN  - c/w trazodone    9. hyperkalemia - resolved    DVT on AC- xarelto stopped for tian, changed to heparin drip    GI PPx: protonix

## 2018-06-09 NOTE — PROGRESS NOTE ADULT - SUBJECTIVE AND OBJECTIVE BOX
ILEANA MCNEIL  47y  Female      Patient is a 47y old  Female who presents with a chief complaint of worsening laryngeal candidiasis (04 Jun 2018 20:08)      INTERVAL HPI/OVERNIGHT EVENTS: feels weaker today. c/o b/l leg swelling. denies sob. denies change in urine. throat hoarseness about the same. hemoptysis resolved      REVIEW OF SYSTEMS:  as above  All other review of systems negative    T(C): 36.6 (06-09-18 @ 06:16), Max: 36.6 (06-09-18 @ 06:16)  HR: 70 (06-09-18 @ 08:03) (70 - 83)  BP: 106/53 (06-09-18 @ 08:03) (92/54 - 106/53)  RR: 17 (06-09-18 @ 06:16) (17 - 17)  SpO2: 99% (06-09-18 @ 08:03) (99% - 99%)  Wt(kg): --Vital Signs Last 24 Hrs  T(C): 36.6 (09 Jun 2018 06:16), Max: 36.6 (09 Jun 2018 06:16)  T(F): 97.8 (09 Jun 2018 06:16), Max: 97.8 (09 Jun 2018 06:16)  HR: 70 (09 Jun 2018 08:03) (70 - 83)  BP: 106/53 (09 Jun 2018 08:03) (92/54 - 106/53)  BP(mean): --  RR: 17 (09 Jun 2018 06:16) (17 - 17)  SpO2: 99% (09 Jun 2018 08:03) (99% - 99%)      06-08-18 @ 07:01  -  06-09-18 @ 07:00  --------------------------------------------------------  IN: 750 mL / OUT: 800 mL / NET: -50 mL    06-09-18 @ 07:01  -  06-09-18 @ 16:06  --------------------------------------------------------  IN: 486 mL / OUT: 150 mL / NET: 336 mL        PHYSICAL EXAM:  GENERAL: NAD  PSYCH: no agitation, baseline mentation  HEENT: tiny R sided posterior pharynx white lesion, hoarse  NERVOUS SYSTEM:  Alert & Oriented X3, no new focal deficits  PULMONARY: No rales, rhonchi, wheezing, or rubs, coarse breath sounds b/l, chronic  CARDIOVASCULAR: Regular rate and rhythm; No murmurs, rubs, or gallops  GI: Soft, Nontender, Nondistended; Bowel sounds present   no cvat  EXTREMITIES:  nonpitting edema subtle b/l le    Consultant(s) Notes Reviewed:  [x ] YES  [ ] NO    Discussed with Consultants/Other Providers [ x] YES     LABS                          11.0   7.10  )-----------( 169      ( 09 Jun 2018 05:26 )             33.7     06-09    137  |  97<L>  |  22<H>  ----------------------------<  97  4.5   |  29  |  1.7<H>    Ca    8.6      09 Jun 2018 05:26  Mg     1.8     06-09    TPro  5.6<L>  /  Alb  3.4<L>  /  TBili  0.5  /  DBili  x   /  AST  11  /  ALT  5   /  AlkPhos  64  06-08          Lactate Trend  06-04 @ 17:59 Lactate:0.7         CAPILLARY BLOOD GLUCOSE            RADIOLOGY & ADDITIONAL TESTS:    Imaging Personally Reviewed:  [ ] YES  [ ] NO    HEALTH ISSUES - PROBLEM Dx:

## 2018-06-09 NOTE — PROGRESS NOTE ADULT - ASSESSMENT
48 y/o F with significant PMH of CHF s/p AICD+PPM, DVT on xarelto, COPD, RA, laryngeal cancer s/p excision in november, s/p  2 week history of esophageal thrush 4 weeks ago status post PO diflucan treatment.     # Hemoptysis - resolved on 6/8    # TIAN likely due to liposomal amphotericin  - c/w to monitor BMP for Cr,   - f/u ID for worsening Cr ---> change to IV micafungin while inpatient, Itraconazole 200mg PO BID x 7D outpatient  - f/u urine studies--> FeNa 1.3    # Worsening Laryngeal Candidiasis, s/p EGD 5/15/2018  - ID: IV micafungin while inpatient, Itraconazole 200mg PO BID x 7D outpatient  - f/u BMP, Cr, CBC daily  - Monitor Mg2+ and replete as needed  - check daily weights.   - c/w swish and swallow Nystatin as per ENT recs  - HIV negative (5/10)  - f/u CXR --> no ACS   - f/u blood cultures - negative    # Throat Cancer s/p excision in Nov 2017; as per pt she was put on CellCept for her LUPUS 2 years ago by her Rheumatologist  and stopped taking it in Feb2018. She said she is not on any other chemotherapy.   - Heme/onc consult ordered --> f/u with her doctor outpatient; pt was seen by Heme/Onc () on last admission    # COPD exacerbation - resolved  - c/w duonebs q4+prn,   - hold off on steroids     # CHF s/p AICD+PPM:   - EP saw pt yesterday, will call EP to follow up   - f/u echo; last echo in 2015 LVEF 55-60% --> LVEF 65%    # Acute DVT  - previously on Xarelto, switched to Hep drip given TIAN while on liposomal amphotericin--> f/u PTT and dose Hep    # HTN - stable  - c/w toprol, hydralazine    # RA - controlled  - c/w outpt benlysta + methotrexate shots  - c/w folic acid supplementation    # Chronic Pain 2/2 Multiple Back Sx  - c/w MS contin   - c/w dilaudid PRN  - c/w gabapentin    # Anxiety  - c/w xanax PRN  - c/w trazodone    # hyperkalemia - resolved    # ckd 2 - controlled  - monitor BUN, Cr    DVT PPX: on Hep drip  GI PPx: protonix  Dispo: Home after medically stable

## 2018-06-09 NOTE — PROGRESS NOTE ADULT - SUBJECTIVE AND OBJECTIVE BOX
SUBJECTIVE:    Patient is a 47y old  Female who presents with a chief complaint of worsening laryngeal candidiasis (2018 20:08)    Currently admitted to medicine with the primary diagnosis of Candida esophagitis     Today is hospital day 5d. This morning she is resting comfortably in bed and reports no new issues or overnight events.     PAST MEDICAL & SURGICAL HISTORY  PAST MEDICAL & SURGICAL HISTORY:  DVT (deep venous thrombosis)  COPD (chronic obstructive pulmonary disease)  CHF (congestive heart failure)  HTN (hypertension)  RA (rheumatoid arthritis)  Throat cancer  AICD (automatic cardioverter/defibrillator) present  Lupus  History of laryngeal cancer  S/P cholecystectomy  S/P appendectomy    SOCIAL HISTORY:    ALLERGIES:  Avelox (Other)  Plaquenil (Other)  Vantin (Other (Mild))    MEDICATIONS:  STANDING MEDICATIONS  docusate sodium 100 milliGRAM(s) Oral three times a day  fluticasone furoate/vilanterol 100-25 MICROgram(s) Inhaler 1 Puff(s) Inhalation daily  folic acid 1 milliGRAM(s) Oral daily  gabapentin 200 milliGRAM(s) Oral two times a day  heparin  Infusion 1200 Unit(s)/Hr IV Continuous <Continuous>  hydrALAZINE 50 milliGRAM(s) Oral every 8 hours  hydrOXYzine hydrochloride 25 milliGRAM(s) Oral two times a day  metoclopramide 10 milliGRAM(s) Oral three times a day before meals  metoprolol succinate ER 50 milliGRAM(s) Oral daily  micafungin IVPB 100 milliGRAM(s) IV Intermittent every 24 hours  morphine ER Tablet 200 milliGRAM(s) Oral three times a day  nystatin    Suspension 971375 Unit(s) Oral four times a day  pantoprazole    Tablet 40 milliGRAM(s) Oral before breakfast  senna 2 Tablet(s) Oral at bedtime  sodium chloride 0.9%. 1000 milliLiter(s) IV Continuous <Continuous>  traZODone 100 milliGRAM(s) Oral at bedtime    PRN MEDICATIONS  ALBUTerol/ipratropium for Nebulization 3 milliLiter(s) Nebulizer every 6 hours PRN  ALPRAZolam 1 milliGRAM(s) Oral <User Schedule> PRN  HYDROmorphone   Tablet 8 milliGRAM(s) Oral every 4 hours PRN    VITALS:   T(F): 97.8  HR: 70  BP: 106/53  RR: 17  SpO2: 99%    HOME MEDS: </u  Benlysta: 200 milligram(s) intravenous once a week  Breo Ellipta 100 mcg-25 mcg/inh inhalation powder: 1 puff(s) inhaled once a day  Dilaudid 8 mg oral tablet: 1 tab(s) orally every 4 hours, As Needed  folic acid 1 mg oral tablet: 1 tab(s) orally once a day  gabapentin 300 mg oral tablet: orally 2 times a day  hydrALAZINE 50 mg oral tablet: orally every 8 hours  itraconazole 200 mg oral tablet: 1 tab(s) orally every 12 hours   methotrexate: injectable once a week  MS Contin 200 mg oral tablet, extended release: 1 tab(s) orally every 8 hours  Prevacid 30 mg oral delayed release capsule: 1 cap(s) orally once a day  ProAir HFA 90 mcg/inh inhalation aerosol: 2 puff(s) inhaled 4 times a day, As Needed  Reglan 10 mg oral tablet: 1 tab(s) orally 3 times a day (before meals)  Toprol-XL 50 mg oral tablet, extended release: 1 tab(s) orally once a day  traZODone 100 mg oral tablet: 1 tab(s) orally once a day (at bedtime)  Vistaril 50 mg oral capsule: orally 2 times a day  Vitamin B12 1000 mcg/mL injectable solution: injectable 2 times a week  Xanax 1 mg oral tablet: orally every 4 hours, As Needed  Xarelto 20 mg oral tablet: 1 tab(s) orally once a day (in the evening)      LABS:                        11.0   7.10  )-----------( 169      ( 2018 05:26 )             33.7     -    137  |  97<L>  |  22<H>  ----------------------------<  97  4.5   |  29  |  1.7<H>    Ca    8.6      2018 05:26  Mg     1.8         TPro  5.6<L>  /  Alb  3.4<L>  /  TBili  0.5  /  DBili  x   /  AST  11  /  ALT  5   /  AlkPhos  64        Urinalysis Basic - ( 2018 15:45 )    Color: Yellow / Appearance: Clear / S.010 / pH: x  Gluc: x / Ketone: Negative  / Bili: Negative / Urobili: 0.2 mg/dL   Blood: x / Protein: Negative mg/dL / Nitrite: Negative   Leuk Esterase: Trace / RBC: x / WBC x   Sq Epi: x / Non Sq Epi: x / Bacteria: Few /HPF                RADIOLOGY:  reviewed     PHYSICAL EXAM:  GEN: No acute distress  HEENT: WNL  LUNGS: Clear to auscultation bilaterally   HEART: S1/S2 present. RRR.   ABD: Soft, non-tender, non-distended. Bowel sounds present  EXT: no LE edema  NEURO: AAOX3

## 2018-06-10 LAB
ANION GAP SERPL CALC-SCNC: 12 MMOL/L — SIGNIFICANT CHANGE UP (ref 7–14)
APTT BLD: 88.5 SEC — CRITICAL HIGH (ref 27–39.2)
BASOPHILS # BLD AUTO: 0.04 K/UL — SIGNIFICANT CHANGE UP (ref 0–0.2)
BASOPHILS NFR BLD AUTO: 0.6 % — SIGNIFICANT CHANGE UP (ref 0–1)
BUN SERPL-MCNC: 22 MG/DL — HIGH (ref 10–20)
CALCIUM SERPL-MCNC: 8.4 MG/DL — LOW (ref 8.5–10.1)
CHLORIDE SERPL-SCNC: 100 MMOL/L — SIGNIFICANT CHANGE UP (ref 98–110)
CO2 SERPL-SCNC: 27 MMOL/L — SIGNIFICANT CHANGE UP (ref 17–32)
CREAT SERPL-MCNC: 1.7 MG/DL — HIGH (ref 0.7–1.5)
CULTURE RESULTS: SIGNIFICANT CHANGE UP
EOSINOPHIL # BLD AUTO: 0.22 K/UL — SIGNIFICANT CHANGE UP (ref 0–0.7)
EOSINOPHIL NFR BLD AUTO: 3.4 % — SIGNIFICANT CHANGE UP (ref 0–8)
GLUCOSE SERPL-MCNC: 111 MG/DL — HIGH (ref 70–99)
HCT VFR BLD CALC: 32.3 % — LOW (ref 37–47)
HGB BLD-MCNC: 10.5 G/DL — LOW (ref 12–16)
IMM GRANULOCYTES NFR BLD AUTO: 0.3 % — SIGNIFICANT CHANGE UP (ref 0.1–0.3)
LYMPHOCYTES # BLD AUTO: 2.68 K/UL — SIGNIFICANT CHANGE UP (ref 1.2–3.4)
LYMPHOCYTES # BLD AUTO: 42 % — SIGNIFICANT CHANGE UP (ref 20.5–51.1)
MAGNESIUM SERPL-MCNC: 1.7 MG/DL — LOW (ref 1.8–2.4)
MCHC RBC-ENTMCNC: 28 PG — SIGNIFICANT CHANGE UP (ref 27–31)
MCHC RBC-ENTMCNC: 32.5 G/DL — SIGNIFICANT CHANGE UP (ref 32–37)
MCV RBC AUTO: 86.1 FL — SIGNIFICANT CHANGE UP (ref 81–99)
MONOCYTES # BLD AUTO: 0.86 K/UL — HIGH (ref 0.1–0.6)
MONOCYTES NFR BLD AUTO: 13.5 % — HIGH (ref 1.7–9.3)
NEUTROPHILS # BLD AUTO: 2.56 K/UL — SIGNIFICANT CHANGE UP (ref 1.4–6.5)
NEUTROPHILS NFR BLD AUTO: 40.2 % — LOW (ref 42.2–75.2)
NRBC # BLD: 0 /100 WBCS — SIGNIFICANT CHANGE UP (ref 0–0)
PLATELET # BLD AUTO: 145 K/UL — SIGNIFICANT CHANGE UP (ref 130–400)
POTASSIUM SERPL-MCNC: 4.1 MMOL/L — SIGNIFICANT CHANGE UP (ref 3.5–5)
POTASSIUM SERPL-SCNC: 4.1 MMOL/L — SIGNIFICANT CHANGE UP (ref 3.5–5)
RBC # BLD: 3.75 M/UL — LOW (ref 4.2–5.4)
RBC # FLD: 13.3 % — SIGNIFICANT CHANGE UP (ref 11.5–14.5)
SODIUM SERPL-SCNC: 139 MMOL/L — SIGNIFICANT CHANGE UP (ref 135–146)
SPECIMEN SOURCE: SIGNIFICANT CHANGE UP
WBC # BLD: 6.38 K/UL — SIGNIFICANT CHANGE UP (ref 4.8–10.8)
WBC # FLD AUTO: 6.38 K/UL — SIGNIFICANT CHANGE UP (ref 4.8–10.8)

## 2018-06-10 RX ADMIN — Medication 25 MILLIGRAM(S): at 17:32

## 2018-06-10 RX ADMIN — Medication 500000 UNIT(S): at 23:38

## 2018-06-10 RX ADMIN — Medication 50 MILLIGRAM(S): at 22:07

## 2018-06-10 RX ADMIN — FLUTICASONE FUROATE AND VILANTEROL TRIFENATATE 1 PUFF(S): 100; 25 POWDER RESPIRATORY (INHALATION) at 08:20

## 2018-06-10 RX ADMIN — MORPHINE SULFATE 200 MILLIGRAM(S): 50 CAPSULE, EXTENDED RELEASE ORAL at 06:07

## 2018-06-10 RX ADMIN — Medication 100 MILLIGRAM(S): at 13:31

## 2018-06-10 RX ADMIN — MORPHINE SULFATE 200 MILLIGRAM(S): 50 CAPSULE, EXTENDED RELEASE ORAL at 22:12

## 2018-06-10 RX ADMIN — Medication 50 MILLIGRAM(S): at 13:31

## 2018-06-10 RX ADMIN — Medication 10 MILLIGRAM(S): at 17:32

## 2018-06-10 RX ADMIN — MORPHINE SULFATE 200 MILLIGRAM(S): 50 CAPSULE, EXTENDED RELEASE ORAL at 22:45

## 2018-06-10 RX ADMIN — GABAPENTIN 200 MILLIGRAM(S): 400 CAPSULE ORAL at 06:05

## 2018-06-10 RX ADMIN — Medication 25 MILLIGRAM(S): at 06:06

## 2018-06-10 RX ADMIN — Medication 100 MILLIGRAM(S): at 22:07

## 2018-06-10 RX ADMIN — Medication 1 MILLIGRAM(S): at 12:29

## 2018-06-10 RX ADMIN — Medication 1 MILLIGRAM(S): at 08:18

## 2018-06-10 RX ADMIN — MORPHINE SULFATE 200 MILLIGRAM(S): 50 CAPSULE, EXTENDED RELEASE ORAL at 14:30

## 2018-06-10 RX ADMIN — Medication 10 MILLIGRAM(S): at 12:29

## 2018-06-10 RX ADMIN — Medication 50 MILLIGRAM(S): at 08:18

## 2018-06-10 RX ADMIN — SODIUM CHLORIDE 75 MILLILITER(S): 9 INJECTION INTRAMUSCULAR; INTRAVENOUS; SUBCUTANEOUS at 08:20

## 2018-06-10 RX ADMIN — MORPHINE SULFATE 200 MILLIGRAM(S): 50 CAPSULE, EXTENDED RELEASE ORAL at 13:29

## 2018-06-10 RX ADMIN — Medication 500000 UNIT(S): at 01:32

## 2018-06-10 RX ADMIN — HYDROMORPHONE HYDROCHLORIDE 8 MILLIGRAM(S): 2 INJECTION INTRAMUSCULAR; INTRAVENOUS; SUBCUTANEOUS at 02:20

## 2018-06-10 RX ADMIN — HEPARIN SODIUM 1100 UNIT(S)/HR: 5000 INJECTION INTRAVENOUS; SUBCUTANEOUS at 09:09

## 2018-06-10 RX ADMIN — HEPARIN SODIUM 1100 UNIT(S)/HR: 5000 INJECTION INTRAVENOUS; SUBCUTANEOUS at 01:30

## 2018-06-10 RX ADMIN — Medication 500000 UNIT(S): at 17:32

## 2018-06-10 RX ADMIN — MORPHINE SULFATE 200 MILLIGRAM(S): 50 CAPSULE, EXTENDED RELEASE ORAL at 08:18

## 2018-06-10 RX ADMIN — SODIUM CHLORIDE 75 MILLILITER(S): 9 INJECTION INTRAMUSCULAR; INTRAVENOUS; SUBCUTANEOUS at 02:46

## 2018-06-10 RX ADMIN — SENNA PLUS 2 TABLET(S): 8.6 TABLET ORAL at 22:07

## 2018-06-10 RX ADMIN — Medication 10 MILLIGRAM(S): at 06:05

## 2018-06-10 RX ADMIN — MICAFUNGIN SODIUM 105 MILLIGRAM(S): 100 INJECTION, POWDER, LYOPHILIZED, FOR SOLUTION INTRAVENOUS at 17:31

## 2018-06-10 RX ADMIN — Medication 500000 UNIT(S): at 06:05

## 2018-06-10 RX ADMIN — PANTOPRAZOLE SODIUM 40 MILLIGRAM(S): 20 TABLET, DELAYED RELEASE ORAL at 06:05

## 2018-06-10 RX ADMIN — GABAPENTIN 200 MILLIGRAM(S): 400 CAPSULE ORAL at 17:32

## 2018-06-10 RX ADMIN — Medication 500000 UNIT(S): at 12:29

## 2018-06-10 RX ADMIN — Medication 1 MILLIGRAM(S): at 22:06

## 2018-06-10 RX ADMIN — HYDROMORPHONE HYDROCHLORIDE 8 MILLIGRAM(S): 2 INJECTION INTRAMUSCULAR; INTRAVENOUS; SUBCUTANEOUS at 22:07

## 2018-06-10 RX ADMIN — Medication 100 MILLIGRAM(S): at 06:05

## 2018-06-10 NOTE — PROGRESS NOTE ADULT - SUBJECTIVE AND OBJECTIVE BOX
ILEANA MCNEIL  47y  Female      Patient is a 47y old  Female who presents with a chief complaint of worsening laryngeal candidiasis (04 Jun 2018 20:08)      INTERVAL HPI/OVERNIGHT EVENTS: c/o b/l LE swelling worsening. also says she had some drainage from her left upper arm yesterday      REVIEW OF SYSTEMS:  as above  All other review of systems negative    T(C): 36.7 (06-10-18 @ 13:43), Max: 37.3 (06-09-18 @ 21:03)  HR: 76 (06-10-18 @ 13:43) (71 - 76)  BP: 112/56 (06-10-18 @ 13:43) (96/51 - 129/64)  RR: 18 (06-10-18 @ 13:43) (18 - 18)  SpO2: 95% (06-10-18 @ 08:13) (95% - 98%)  Wt(kg): --Vital Signs Last 24 Hrs  T(C): 36.7 (10 Geovani 2018 13:43), Max: 37.3 (09 Jun 2018 21:03)  T(F): 98.1 (10 Geovani 2018 13:43), Max: 99.1 (09 Jun 2018 21:03)  HR: 76 (10 Geovani 2018 13:43) (71 - 76)  BP: 112/56 (10 Geovani 2018 13:43) (96/51 - 129/64)  BP(mean): --  RR: 18 (10 Geovani 2018 13:43) (18 - 18)  SpO2: 95% (10 Geovani 2018 08:13) (95% - 98%)      06-09-18 @ 07:01  -  06-10-18 @ 07:00  --------------------------------------------------------  IN: 847 mL / OUT: 200 mL / NET: 647 mL    06-10-18 @ 07:01  -  06-10-18 @ 16:04  --------------------------------------------------------  IN: 163 mL / OUT: 100 mL / NET: 63 mL        PHYSICAL EXAM:  GENERAL: NAD  PSYCH: no agitation, baseline mentation  HEENT hoarse, no pharyngeal lesions identified today  NERVOUS SYSTEM:  Alert & Oriented X3, no new focal deficits  PULMONARY: Clear to percussion bilaterally; No rales, rhonchi, wheezing, or rubs  CARDIOVASCULAR: Regular rate and rhythm; No murmurs, rubs, or gallops  GI: Soft, Nontender, Nondistended; Bowel sounds present   no cvat b/l  EXTREMITIES:  nonpitting edema, LUE small pustule in antecubital fossa    Consultant(s) Notes Reviewed:  [x ] YES  [ ] NO    Discussed with Consultants/Other Providers [ x] YES     LABS                          10.5   6.38  )-----------( 145      ( 10 Geovani 2018 06:22 )             32.3     06-10    139  |  100  |  22<H>  ----------------------------<  111<H>  4.1   |  27  |  1.7<H>    Ca    8.4<L>      10 Geovani 2018 06:22  Mg     1.7     06-10          PTT - ( 10 Geovani 2018 06:22 )  PTT:88.5 sec  Lactate Trend        CAPILLARY BLOOD GLUCOSE            RADIOLOGY & ADDITIONAL TESTS:    Imaging Personally Reviewed:  [ ] YES  [ ] NO    HEALTH ISSUES - PROBLEM Dx:

## 2018-06-10 NOTE — PROGRESS NOTE ADULT - ASSESSMENT
48 y/o F with significant PMH of CHF s/p AICD+PPM, DVT on xarelto, COPD, RA, laryngeal cancer s/p excision in november, s/p  2 week history of esophageal thrush 4 weeks ago status post PO diflucan treatment.     # Worsening Laryngeal Candidiasis, s/p EGD 5/15/2018  modified diet  changed to IV micafungin with ID guidance    2. Throat Cancer? s/p excision in Nov 2017; as per pt she was put on CellCept for her LUPUS 2 years ago by her Rheumatologist  and stopped taking it in Feb2018. She said she is not on any other chemotherapy.   hem/onc f/u    TIAN on CKD2  not worsening today  fena consistent with intrinsic renal injury  gently hydrated, will stop for c/o edema, renal c/s  amphotericin stopped    #LUE pustule  check US duplex  monitor for fever curve or worsening erythema  if spikes fever or spreads will empirically cover with abx (ex clinda)      3. COPD exacerbation - resolved  - c/w duonebs q4+prn,   - hold off on steroids   resume home breo, family to bring in    4. chronic systolic heart failure s/p AICD+PPM:   device interrogation  lung bases clear, not requiring supplemental O2, no evidence of exacerbation    5. HTN - stable  - c/w toprol, hydralazine    6 RA - controlled  - c/w outpt benlysta + methotrexate shots  - c/w folic acid supplementation    7. Chronic Pain 2/2 Multiple Back Sx  - c/w MS contin   - c/w dilaudid PRN  - c/w gabapentin    8. Anxiety  - c/w xanax PRN  - c/w trazodone    9. hyperkalemia - resolved    DVT on AC- xarelto stopped for tian, changed to heparin drip    GI PPx: protonix

## 2018-06-11 LAB
ANION GAP SERPL CALC-SCNC: 11 MMOL/L — SIGNIFICANT CHANGE UP (ref 7–14)
APTT BLD: 122.6 SEC — CRITICAL HIGH (ref 27–39.2)
APTT BLD: 154.6 SEC — CRITICAL HIGH (ref 27–39.2)
APTT BLD: >200 SEC — CRITICAL HIGH (ref 27–39.2)
APTT BLD: >200 SEC — CRITICAL HIGH (ref 27–39.2)
BASOPHILS # BLD AUTO: 0.04 K/UL — SIGNIFICANT CHANGE UP (ref 0–0.2)
BASOPHILS NFR BLD AUTO: 0.8 % — SIGNIFICANT CHANGE UP (ref 0–1)
BUN SERPL-MCNC: 20 MG/DL — SIGNIFICANT CHANGE UP (ref 10–20)
CALCIUM SERPL-MCNC: 8.9 MG/DL — SIGNIFICANT CHANGE UP (ref 8.5–10.1)
CHLORIDE SERPL-SCNC: 100 MMOL/L — SIGNIFICANT CHANGE UP (ref 98–110)
CHLORIDE UR-SCNC: 60 — SIGNIFICANT CHANGE UP
CO2 SERPL-SCNC: 26 MMOL/L — SIGNIFICANT CHANGE UP (ref 17–32)
CREAT SERPL-MCNC: 1.5 MG/DL — SIGNIFICANT CHANGE UP (ref 0.7–1.5)
D DIMER BLD IA.RAPID-MCNC: 102 NG/ML DDU — SIGNIFICANT CHANGE UP (ref 0–230)
EOSINOPHIL # BLD AUTO: 0.15 K/UL — SIGNIFICANT CHANGE UP (ref 0–0.7)
EOSINOPHIL NFR BLD AUTO: 2.9 % — SIGNIFICANT CHANGE UP (ref 0–8)
GLUCOSE SERPL-MCNC: 94 MG/DL — SIGNIFICANT CHANGE UP (ref 70–99)
HCT VFR BLD CALC: 33.3 % — LOW (ref 37–47)
HGB BLD-MCNC: 10.9 G/DL — LOW (ref 12–16)
IMM GRANULOCYTES NFR BLD AUTO: 0.4 % — HIGH (ref 0.1–0.3)
LYMPHOCYTES # BLD AUTO: 2.14 K/UL — SIGNIFICANT CHANGE UP (ref 1.2–3.4)
LYMPHOCYTES # BLD AUTO: 41.9 % — SIGNIFICANT CHANGE UP (ref 20.5–51.1)
MAGNESIUM SERPL-MCNC: 1.7 MG/DL — LOW (ref 1.8–2.4)
MCHC RBC-ENTMCNC: 28 PG — SIGNIFICANT CHANGE UP (ref 27–31)
MCHC RBC-ENTMCNC: 32.7 G/DL — SIGNIFICANT CHANGE UP (ref 32–37)
MCV RBC AUTO: 85.6 FL — SIGNIFICANT CHANGE UP (ref 81–99)
MONOCYTES # BLD AUTO: 0.81 K/UL — HIGH (ref 0.1–0.6)
MONOCYTES NFR BLD AUTO: 15.9 % — HIGH (ref 1.7–9.3)
NEUTROPHILS # BLD AUTO: 1.95 K/UL — SIGNIFICANT CHANGE UP (ref 1.4–6.5)
NEUTROPHILS NFR BLD AUTO: 38.1 % — LOW (ref 42.2–75.2)
NRBC # BLD: 0 /100 WBCS — SIGNIFICANT CHANGE UP (ref 0–0)
PLATELET # BLD AUTO: 159 K/UL — SIGNIFICANT CHANGE UP (ref 130–400)
POTASSIUM SERPL-MCNC: 4.1 MMOL/L — SIGNIFICANT CHANGE UP (ref 3.5–5)
POTASSIUM SERPL-SCNC: 4.1 MMOL/L — SIGNIFICANT CHANGE UP (ref 3.5–5)
RBC # BLD: 3.89 M/UL — LOW (ref 4.2–5.4)
RBC # FLD: 13.2 % — SIGNIFICANT CHANGE UP (ref 11.5–14.5)
SODIUM SERPL-SCNC: 137 MMOL/L — SIGNIFICANT CHANGE UP (ref 135–146)
WBC # BLD: 5.11 K/UL — SIGNIFICANT CHANGE UP (ref 4.8–10.8)
WBC # FLD AUTO: 5.11 K/UL — SIGNIFICANT CHANGE UP (ref 4.8–10.8)

## 2018-06-11 PROCEDURE — 99232 SBSQ HOSP IP/OBS MODERATE 35: CPT | Mod: 25

## 2018-06-11 PROCEDURE — 31575 DIAGNOSTIC LARYNGOSCOPY: CPT

## 2018-06-11 PROCEDURE — 93971 EXTREMITY STUDY: CPT | Mod: 26

## 2018-06-11 RX ORDER — HEPARIN SODIUM 5000 [USP'U]/ML
900 INJECTION INTRAVENOUS; SUBCUTANEOUS
Qty: 25000 | Refills: 0 | Status: DISCONTINUED | OUTPATIENT
Start: 2018-06-11 | End: 2018-06-12

## 2018-06-11 RX ADMIN — Medication 10 MILLIGRAM(S): at 11:26

## 2018-06-11 RX ADMIN — Medication 50 MILLIGRAM(S): at 21:58

## 2018-06-11 RX ADMIN — Medication 10 MILLIGRAM(S): at 17:41

## 2018-06-11 RX ADMIN — GABAPENTIN 200 MILLIGRAM(S): 400 CAPSULE ORAL at 17:41

## 2018-06-11 RX ADMIN — Medication 500000 UNIT(S): at 06:35

## 2018-06-11 RX ADMIN — MORPHINE SULFATE 200 MILLIGRAM(S): 50 CAPSULE, EXTENDED RELEASE ORAL at 07:04

## 2018-06-11 RX ADMIN — HEPARIN SODIUM 9 UNIT(S)/HR: 5000 INJECTION INTRAVENOUS; SUBCUTANEOUS at 15:36

## 2018-06-11 RX ADMIN — PANTOPRAZOLE SODIUM 40 MILLIGRAM(S): 20 TABLET, DELAYED RELEASE ORAL at 06:35

## 2018-06-11 RX ADMIN — Medication 50 MILLIGRAM(S): at 14:15

## 2018-06-11 RX ADMIN — MORPHINE SULFATE 200 MILLIGRAM(S): 50 CAPSULE, EXTENDED RELEASE ORAL at 21:58

## 2018-06-11 RX ADMIN — HYDROMORPHONE HYDROCHLORIDE 8 MILLIGRAM(S): 2 INJECTION INTRAMUSCULAR; INTRAVENOUS; SUBCUTANEOUS at 06:34

## 2018-06-11 RX ADMIN — Medication 500000 UNIT(S): at 17:41

## 2018-06-11 RX ADMIN — MORPHINE SULFATE 200 MILLIGRAM(S): 50 CAPSULE, EXTENDED RELEASE ORAL at 22:28

## 2018-06-11 RX ADMIN — Medication 1 MILLIGRAM(S): at 22:53

## 2018-06-11 RX ADMIN — Medication 1 MILLIGRAM(S): at 14:31

## 2018-06-11 RX ADMIN — MORPHINE SULFATE 200 MILLIGRAM(S): 50 CAPSULE, EXTENDED RELEASE ORAL at 06:34

## 2018-06-11 RX ADMIN — Medication 1 MILLIGRAM(S): at 11:27

## 2018-06-11 RX ADMIN — Medication 50 MILLIGRAM(S): at 06:35

## 2018-06-11 RX ADMIN — GABAPENTIN 200 MILLIGRAM(S): 400 CAPSULE ORAL at 06:35

## 2018-06-11 RX ADMIN — Medication 100 MILLIGRAM(S): at 06:35

## 2018-06-11 RX ADMIN — Medication 100 MILLIGRAM(S): at 14:15

## 2018-06-11 RX ADMIN — Medication 100 MILLIGRAM(S): at 21:57

## 2018-06-11 RX ADMIN — MICAFUNGIN SODIUM 105 MILLIGRAM(S): 100 INJECTION, POWDER, LYOPHILIZED, FOR SOLUTION INTRAVENOUS at 17:42

## 2018-06-11 RX ADMIN — Medication 25 MILLIGRAM(S): at 17:43

## 2018-06-11 RX ADMIN — Medication 1 MILLIGRAM(S): at 06:42

## 2018-06-11 RX ADMIN — HYDROMORPHONE HYDROCHLORIDE 8 MILLIGRAM(S): 2 INJECTION INTRAMUSCULAR; INTRAVENOUS; SUBCUTANEOUS at 07:04

## 2018-06-11 RX ADMIN — MORPHINE SULFATE 200 MILLIGRAM(S): 50 CAPSULE, EXTENDED RELEASE ORAL at 14:14

## 2018-06-11 RX ADMIN — Medication 500000 UNIT(S): at 11:27

## 2018-06-11 RX ADMIN — Medication 25 MILLIGRAM(S): at 06:35

## 2018-06-11 RX ADMIN — Medication 10 MILLIGRAM(S): at 06:35

## 2018-06-11 NOTE — PROGRESS NOTE ADULT - SUBJECTIVE AND OBJECTIVE BOX
SUBJECTIVE:    Patient is a 47y old  Female who presents with a chief complaint of worsening laryngeal candidiasis      Patient was seen at bedside this morning. Patient was in no acute distress. Patient said left arm swelling has gone down and her LE swelling has gone down today as well.     PAST MEDICAL & SURGICAL HISTORY  PAST MEDICAL & SURGICAL HISTORY:  DVT (deep venous thrombosis)  COPD (chronic obstructive pulmonary disease)  CHF (congestive heart failure)  HTN (hypertension)  RA (rheumatoid arthritis)  Throat cancer  AICD (automatic cardioverter/defibrillator) present  Lupus  History of laryngeal cancer  S/P cholecystectomy  S/P appendectomy    SOCIAL HISTORY:    ALLERGIES:  Avelox (Other)  Plaquenil (Other)  Vantin (Other (Mild))    MEDICATIONS:  STANDING MEDICATIONS  docusate sodium 100 milliGRAM(s) Oral three times a day  fluticasone furoate/vilanterol 100-25 MICROgram(s) Inhaler 1 Puff(s) Inhalation daily  folic acid 1 milliGRAM(s) Oral daily  gabapentin 200 milliGRAM(s) Oral two times a day  heparin  Infusion. 1100 Unit(s)/Hr IV Continuous <Continuous>  hydrALAZINE 50 milliGRAM(s) Oral every 8 hours  hydrOXYzine hydrochloride 25 milliGRAM(s) Oral two times a day  metoclopramide 10 milliGRAM(s) Oral three times a day before meals  metoprolol succinate ER 50 milliGRAM(s) Oral daily  micafungin IVPB 100 milliGRAM(s) IV Intermittent every 24 hours  morphine ER Tablet 200 milliGRAM(s) Oral three times a day  nystatin    Suspension 963464 Unit(s) Oral four times a day  pantoprazole    Tablet 40 milliGRAM(s) Oral before breakfast  senna 2 Tablet(s) Oral at bedtime  traZODone 100 milliGRAM(s) Oral at bedtime    PRN MEDICATIONS  ALBUTerol/ipratropium for Nebulization 3 milliLiter(s) Nebulizer every 6 hours PRN  ALPRAZolam 1 milliGRAM(s) Oral <User Schedule> PRN  HYDROmorphone   Tablet 8 milliGRAM(s) Oral every 4 hours PRN    VITALS:   T(F): 97.5  HR: 77  BP: 100/57  RR: 16  SpO2: 96%    HOME MEDS: </u  Benlysta: 200 milligram(s) intravenous once a week  Breo Ellipta 100 mcg-25 mcg/inh inhalation powder: 1 puff(s) inhaled once a day  Dilaudid 8 mg oral tablet: 1 tab(s) orally every 4 hours, As Needed  folic acid 1 mg oral tablet: 1 tab(s) orally once a day  gabapentin 300 mg oral tablet: orally 2 times a day  hydrALAZINE 50 mg oral tablet: orally every 8 hours  itraconazole 200 mg oral tablet: 1 tab(s) orally every 12 hours   methotrexate: injectable once a week  MS Contin 200 mg oral tablet, extended release: 1 tab(s) orally every 8 hours  Prevacid 30 mg oral delayed release capsule: 1 cap(s) orally once a day  ProAir HFA 90 mcg/inh inhalation aerosol: 2 puff(s) inhaled 4 times a day, As Needed  Reglan 10 mg oral tablet: 1 tab(s) orally 3 times a day (before meals)  Toprol-XL 50 mg oral tablet, extended release: 1 tab(s) orally once a day  traZODone 100 mg oral tablet: 1 tab(s) orally once a day (at bedtime)  Vistaril 50 mg oral capsule: orally 2 times a day  Vitamin B12 1000 mcg/mL injectable solution: injectable 2 times a week  Xanax 1 mg oral tablet: orally every 4 hours, As Needed  Xarelto 20 mg oral tablet: 1 tab(s) orally once a day (in the evening)    LABS:                        10.9   5.11  )-----------( 159      ( 11 Jun 2018 06:21 )             33.3     06-11    137  |  100  |  20  ----------------------------<  94  4.1   |  26  |  1.5    Ca    8.9      11 Jun 2018 06:21  Mg     1.7     06-11      PTT - ( 10 Geovani 2018 06:22 )  PTT:88.5 sec    RADIOLOGY:    PHYSICAL EXAM:  GEN: No acute distress  HEENT: No changes noted, no white spots in oral mucosa.   LUNGS: Clear to auscultation bilaterally   HEART: S1/S2 present. RRR.   ABD: Soft, non-tender, non-distended. Bowel sounds present  EXT: APPLE antecubital area minor swelling present, b/l LE swelling, no pitting  NEURO: AAOX3

## 2018-06-11 NOTE — PROGRESS NOTE ADULT - ASSESSMENT
47 y.o female with h.o laryngeal CA, laryngeal/esophageal candidiasis - diffuse airway edema noted on FFL    ·	cont antifungals as per ID  ·	would recommend giving steroids if not contraindicated (decadron 10mg x 1 dose tonight) for diffuse edema, can give lower dose x 2 more doses after loading  ·	will continue to follow  ·	Dr Lantigua at bedside.

## 2018-06-11 NOTE — CHART NOTE - NSCHARTNOTEFT_GEN_A_CORE
Pt was out of the unit upon RD visit on 6/11 for duplex at 1:25pm. Pt was due for LOS assessment today.   Pt was still out of the unit at 2:10pm. Will do full assessment tomorrow 6/12.

## 2018-06-11 NOTE — PROGRESS NOTE ADULT - SUBJECTIVE AND OBJECTIVE BOX
ILEANA MCNEIL  47y  Female      Patient is a 47y old  Female who presents with a chief complaint of worsening laryngeal candidiasis (04 Jun 2018 20:08)      INTERVAL HPI/OVERNIGHT EVENTS: c/o LE swelling. hoarseness persists but no sore throat. no sob. tired+      REVIEW OF SYSTEMS:  as above  All other review of systems negative    T(C): 36.1 (06-11-18 @ 12:45), Max: 37.3 (06-10-18 @ 21:00)  HR: 82 (06-11-18 @ 12:45) (77 - 82)  BP: 135/60 (06-11-18 @ 12:45) (100/57 - 150/73)  RR: 18 (06-11-18 @ 12:45) (16 - 18)  SpO2: 96% (06-10-18 @ 21:00) (96% - 96%)  Wt(kg): --Vital Signs Last 24 Hrs  T(C): 36.1 (11 Jun 2018 12:45), Max: 37.3 (10 Geovani 2018 21:00)  T(F): 96.9 (11 Jun 2018 12:45), Max: 99.2 (10 Geovani 2018 21:00)  HR: 82 (11 Jun 2018 12:45) (77 - 82)  BP: 135/60 (11 Jun 2018 12:45) (100/57 - 150/73)  BP(mean): --  RR: 18 (11 Jun 2018 12:45) (16 - 18)  SpO2: 96% (10 Geovani 2018 21:00) (96% - 96%)      06-10-18 @ 07:01  -  06-11-18 @ 07:00  --------------------------------------------------------  IN: 285 mL / OUT: 200 mL / NET: 85 mL    06-11-18 @ 07:01  -  06-11-18 @ 17:28  --------------------------------------------------------  IN: 0 mL / OUT: 400 mL / NET: -400 mL        PHYSICAL EXAM:  GENERAL: NAD  PSYCH: no agitation, baseline mentation  HEENT hoarse no exudate  NERVOUS SYSTEM:  Alert & Oriented X3, no new focal deficits  PULMONARY: bibasilar atelectatic changes  CARDIOVASCULAR: Regular rate and rhythm; No murmurs, rubs, or gallops  GI: Soft, Nontender, Nondistended; Bowel sounds present  EXTREMITIES:  nonpitting edema mild    Consultant(s) Notes Reviewed:  [x ] YES  [ ] NO    Discussed with Consultants/Other Providers [ x] YES     LABS                          10.9   5.11  )-----------( 159      ( 11 Jun 2018 06:21 )             33.3     06-11    137  |  100  |  20  ----------------------------<  94  4.1   |  26  |  1.5    Ca    8.9      11 Jun 2018 06:21  Mg     1.7     06-11          PTT - ( 11 Jun 2018 12:54 )  PTT:>200.0 sec  Lactate Trend        CAPILLARY BLOOD GLUCOSE            RADIOLOGY & ADDITIONAL TESTS:    Imaging Personally Reviewed:  [ ] YES  [ ] NO    HEALTH ISSUES - PROBLEM Dx:

## 2018-06-11 NOTE — PROGRESS NOTE ADULT - SUBJECTIVE AND OBJECTIVE BOX
Pt is a 47 y.o female known well to ENT service, called to re-eval larynx for recurrent candidal infections. PT states shes not feeling well today - her throat is bothering her and she feels very tired. Pt has been on IV antifungals per ID.    Vital Signs: T(F): 96.9 (11 Jun 2018 12:45), Max: 99.2 (10 Geovani 2018 21:00), HR: 82, BP: 135/60, RR: 18, SpO2: 96%  GEN: NAD, awake and alert. hoarse voice, poor quality  HEENT: oral mucosa pink, no erythema or plaques noted.   FFL - diffuse airway edema, no patches or plaques noted. no lesions noted.

## 2018-06-11 NOTE — PROGRESS NOTE ADULT - ASSESSMENT
Pt with Throat Ca, SLE (off Cellcept since  Feb 2018), RA, CHF, admitted with refractory laryngeal candidiasis, noted to have Micheal.    MICHEAL - present on admission, got a little worse on Amphotericin, and is trending down after fluid challenge       - baseline creat 0.8 mg% - 1.5 -1.7 - 1.5 mg%       - doubt its due to Amphotericin (usually also causes renal tubular acidosis along with vasoconstriction)        - it is probably prerenal provided retention is ruled out (pt reports difficulties emptying her bladder)       - no need for IVF  Proteinuria mild 0.1 g/g - will monitor        - cont to monitor BMP        - check Bladder Scan for PVR    D/W HS

## 2018-06-11 NOTE — PROGRESS NOTE ADULT - ASSESSMENT
46 y/o F with significant PMH of Lupus, CHF s/p AICD+PPM, DVT on xarelto, COPD, RA, laryngeal cancer s/p excision in november, s/p  2 week history of esophageal thrush 4 weeks ago status post PO diflucan treatment.     # Hemoptysis - resolved on 6/8    # TIAN likely due to liposomal amphotericin  - amphotericin stopped  - c/w to monitor BMP for Cr,   - urine studies--> FeNa 1.3 (intrinsic renal injury)  - gently hydrated, will stop for c/o edema  - f/u Nephrology consult    # Worsening Laryngeal Candidiasis, s/p EGD 5/15/2018  - c/w IV micafungin while inpatient, Itraconazole 200mg PO BID x 7D outpatient as per ID recs  - recall ENT consult --> pending   - f/u BMP, Cr, CBC daily  - Monitor Mg2+ and replete as needed  - check daily weights.   - c/w swish and swallow Nystatin as per ENT recs  - HIV negative (5/10)  - f/u CXR --> no ACS   - f/u blood cultures - negative    # Throat Cancer s/p excision in Nov 2017; as per pt she was put on CellCept for her LUPUS 2 years ago by her Rheumatologist  and stopped taking it in Feb2018. She said she is not on any other chemotherapy.   - Heme/onc consult ordered --> f/u with her doctor outpatient; pt was seen by Heme/Onc () on last admission    #LUE pustule  - f/u US duplex ---> pending  - monitor for fever curve or worsening erythema if spikes fever or spreads will empirically cover with abx (ex clinda)    # COPD exacerbation - resolved  - c/w duonebs q4+prn,   - hold off on steroids  - resume home breo    # CHF s/p AICD+PPM:   - echo done 6/7 showed LVEF 65%     # Acute DVT  - c/w with Hep drip given TIAN while on liposomal amphotericin--> f/u PTT and dose Hep    # HTN - stable  - c/w toprol, hydralazine    # RA - controlled  - c/w outpt benlysta + methotrexate shots  - c/w folic acid supplementation    # Chronic Pain 2/2 Multiple Back Sx  - c/w MS contin   - c/w dilaudid PRN  - c/w gabapentin    # Anxiety  - c/w xanax PRN  - c/w trazodone    # hyperkalemia - resolved    # ckd 2 - controlled  - monitor BUN, Cr    DVT PPX: on Hep drip  GI PPx: protonix  Dispo: Home after medically stable

## 2018-06-11 NOTE — PROGRESS NOTE ADULT - ASSESSMENT
48 y/o F with significant PMH of CHF s/p AICD+PPM, DVT on xarelto, COPD, RA, laryngeal cancer s/p excision in november, s/p  2 week history of esophageal thrush 4 weeks ago status post PO diflucan treatment.     # Worsening Laryngeal Candidiasis, s/p EGD 5/15/2018  modified diet  changed to IV micafungin with ID guidance  will d/c on po antifungal regimen    2. Throat Cancer s/p excision in Nov 2017; as per pt she was put on CellCept for her LUPUS 2 years ago by her Rheumatologist  and stopped taking it in Feb2018. She said she is not on any other chemotherapy.   hem/onc f/u    TIAN on CKD2  marginal improvement  fena consistent with intrinsic renal injury  appreciated renal c/s  will check bladder scan for PVR  amphotericin stopped    #LUE thrombophlebitis  elevation, warm compress  acute dvt was r/o      3. COPD exacerbation - resolved  - c/w duonebs q4+prn,   - hold off on steroids   bronchodilators    4. chronic systolic heart failure s/p AICD+PPM:   device interrogation  lung bases clear, not requiring supplemental O2, no evidence of exacerbation    5. HTN - stable  - c/w toprol, hydralazine    6 RA - controlled  - c/w outpt benlysta + methotrexate shots  - c/w folic acid supplementation    7. Chronic Pain 2/2 Multiple Back Sx  - c/w MS contin   - c/w dilaudid PRN  - c/w gabapentin    8. Anxiety  - c/w xanax PRN  - c/w trazodone    9. hyperkalemia - resolved    DVT on AC- xarelto stopped for tian, changed to heparin drip; once gfr improves will resume home noac    GI PPx: protonix

## 2018-06-11 NOTE — PROGRESS NOTE ADULT - SUBJECTIVE AND OBJECTIVE BOX
NEPHROLOGY CONSULTATION NOTE    Patient is a 47y Female who presented to the hospital with laryngeal candidiasis, failed outpt tx with diflucan. Started on Amphotericin, noted to have an increase in Creat 1.-5 1.7 (creat was 0.9 mg%  may 2018)  Denies severe dysphagia, able to drink liquids no diarrhea.    PAST MEDICAL & SURGICAL HISTORY:  DVT (deep venous thrombosis)  COPD (chronic obstructive pulmonary disease)  CHF (congestive heart failure)  HTN (hypertension)  RA (rheumatoid arthritis)  Throat cancer  AICD (automatic cardioverter/defibrillator) present  Lupus  History of laryngeal cancer  S/P cholecystectomy  S/P appendectomy    Allergies:  Avelox (Other)  Plaquenil (Other)  Vantin (Other (Mild))    Home Medications Reviewed  Hospital Medications:   MEDICATIONS  (STANDING):  docusate sodium 100 milliGRAM(s) Oral three times a day  fluticasone furoate/vilanterol 100-25 MICROgram(s) Inhaler 1 Puff(s) Inhalation daily  folic acid 1 milliGRAM(s) Oral daily  gabapentin 200 milliGRAM(s) Oral two times a day  heparin  Infusion 900 Unit(s)/Hr (9 mL/Hr) IV Continuous <Continuous>  hydrALAZINE 50 milliGRAM(s) Oral every 8 hours  hydrOXYzine hydrochloride 25 milliGRAM(s) Oral two times a day  metoclopramide 10 milliGRAM(s) Oral three times a day before meals  metoprolol succinate ER 50 milliGRAM(s) Oral daily  micafungin IVPB 100 milliGRAM(s) IV Intermittent every 24 hours  morphine ER Tablet 200 milliGRAM(s) Oral three times a day  nystatin    Suspension 059370 Unit(s) Oral four times a day  pantoprazole    Tablet 40 milliGRAM(s) Oral before breakfast  senna 2 Tablet(s) Oral at bedtime  traZODone 100 milliGRAM(s) Oral at bedtime      SOCIAL HISTORY:  Denies ETOH,Smoking,   FAMILY HISTORY:        REVIEW OF SYSTEMS:  CONSTITUTIONAL: No weakness, fevers or chills  EYES/ENT: No visual changes;  No vertigo or throat pain   NECK: No pain or stiffness  RESPIRATORY: has wheezing mild shortness of breath on exertion  CARDIOVASCULAR: No chest pain or palpitations.  GASTROINTESTINAL: No abdominal or epigastric pain. No nausea, vomiting, or hematemesis; No diarrhea or constipation. GENITOURINARY: No dysuria, frequency  SKIN: No itching, burning, rashes, or lesions   VASCULAR: has bilateral lower extremity edema.   All other review of systems is negative unless indicated above.    VITALS:  T(F): 96.9 (18 @ 12:45), Max: 99.2 (06-10-18 @ 21:00)  HR: 82 (18 @ 12:45)  BP: 135/60 (18 @ 12:45)  RR: 18 (18 @ 12:45)  SpO2: 96% (06-10-18 @ 21:00)    06-10 @ 07:  -   @ 07:00  --------------------------------------------------------  IN: 285 mL / OUT: 200 mL / NET: 85 mL     @ 07:  -   @ 15:27  --------------------------------------------------------  IN: 0 mL / OUT: 400 mL / NET: -400 mL            I&O's Detail    10 Geovani 2018 07:  -  2018 07:00  --------------------------------------------------------  IN:    heparin  Infusion.: 110 mL    IV PiggyBack: 100 mL    sodium chloride 0.9%: 75 mL  Total IN: 285 mL    OUT:    Voided: 200 mL  Total OUT: 200 mL    Total NET: 85 mL      2018 07:  -  2018 15:27  --------------------------------------------------------  IN:  Total IN: 0 mL    OUT:    Voided: 400 mL  Total OUT: 400 mL    Total NET: -400 mL            PHYSICAL EXAM:  Constitutional: NAD  HEENT: anicteric sclera, oropharynx clear, MMM  Neck: No JVD  Respiratory:  wheezes b/l present, no rales or rhonchi  Cardiovascular: S1, S2, RRR  Gastrointestinal: BS+, soft, NT/ND  Extremities: 1+ peripheral edema L>R  Neurological: A/O x 3, no focal deficits  Psychiatric: Normal mood, normal affect  : No CVA tenderness. No hughes.   Skin: No rashes  Vascular Access:    LABS:      137  |  100  |  20  ----------------------------<  94  4.1   |  26  |  1.5    Ca    8.9      2018 06:21  Mg     1.7           Creatinine Trend: 1.5 <--, 1.7 <--, 1.7 <--, 1.5 <--, 1.3 <--, 1.1 <--, 0.9 <--, 0.8 <--, 0.8 <--, 1.0 <--, 0.9 <--, 0.8 <--, 0.8 <--                        10.9   5.11  )-----------( 159      ( 2018 06:21 )             33.3     Urine Studies:  Urinalysis Basic - ( 2018 15:45 )    Color: Yellow / Appearance: Clear / S.010 / pH:   Gluc:  / Ketone: Negative  / Bili: Negative / Urobili: 0.2 mg/dL   Blood:  / Protein: Negative mg/dL / Nitrite: Negative   Leuk Esterase: Trace / RBC:  / WBC    Sq Epi:  / Non Sq Epi:  / Bacteria: Few /HPF      Protein/Creatinine Ratio Calculation: 0.1 Ratio ( @ 15:45)  Creatinine, Random Urine: 72 mg/dL ( @ 15:45)  Chloride, Random Urine: 60 ( @ 15:45)  Sodium, Random Urine: 73.0 mmoL/L ( @ 15:45)  Osmolality, Random Urine: 286 mos/kg ( @ 15:45)  Potassium, Random Urine: 22 mmol/L ( @ 15:45)            RADIOLOGY & ADDITIONAL STUDIES:      < from: Xray Chest 2 Views PA/Lat (18 @ 18:44) >       No radiographic evidence of acute cardiopulmonary disease.    < end of copied text >

## 2018-06-12 LAB
ANION GAP SERPL CALC-SCNC: 14 MMOL/L — SIGNIFICANT CHANGE UP (ref 7–14)
APTT BLD: 114.4 SEC — CRITICAL HIGH (ref 27–39.2)
APTT BLD: 76.3 SEC — CRITICAL HIGH (ref 27–39.2)
BASOPHILS # BLD AUTO: 0.04 K/UL — SIGNIFICANT CHANGE UP (ref 0–0.2)
BASOPHILS NFR BLD AUTO: 0.7 % — SIGNIFICANT CHANGE UP (ref 0–1)
BUN SERPL-MCNC: 19 MG/DL — SIGNIFICANT CHANGE UP (ref 10–20)
CALCIUM SERPL-MCNC: 9.4 MG/DL — SIGNIFICANT CHANGE UP (ref 8.5–10.1)
CHLORIDE SERPL-SCNC: 97 MMOL/L — LOW (ref 98–110)
CO2 SERPL-SCNC: 27 MMOL/L — SIGNIFICANT CHANGE UP (ref 17–32)
CREAT SERPL-MCNC: 1.4 MG/DL — SIGNIFICANT CHANGE UP (ref 0.7–1.5)
EOSINOPHIL # BLD AUTO: 0.16 K/UL — SIGNIFICANT CHANGE UP (ref 0–0.7)
EOSINOPHIL NFR BLD AUTO: 2.8 % — SIGNIFICANT CHANGE UP (ref 0–8)
GLUCOSE SERPL-MCNC: 106 MG/DL — HIGH (ref 70–99)
HCT VFR BLD CALC: 35.8 % — LOW (ref 37–47)
HCT VFR BLD CALC: 35.9 % — LOW (ref 37–47)
HGB BLD-MCNC: 11.6 G/DL — LOW (ref 12–16)
HGB BLD-MCNC: 11.8 G/DL — LOW (ref 12–16)
IMM GRANULOCYTES NFR BLD AUTO: 0.2 % — SIGNIFICANT CHANGE UP (ref 0.1–0.3)
LYMPHOCYTES # BLD AUTO: 3.05 K/UL — SIGNIFICANT CHANGE UP (ref 1.2–3.4)
LYMPHOCYTES # BLD AUTO: 54.3 % — HIGH (ref 20.5–51.1)
MAGNESIUM SERPL-MCNC: 1.8 MG/DL — SIGNIFICANT CHANGE UP (ref 1.8–2.4)
MCHC RBC-ENTMCNC: 28 PG — SIGNIFICANT CHANGE UP (ref 27–31)
MCHC RBC-ENTMCNC: 28 PG — SIGNIFICANT CHANGE UP (ref 27–31)
MCHC RBC-ENTMCNC: 32.3 G/DL — SIGNIFICANT CHANGE UP (ref 32–37)
MCHC RBC-ENTMCNC: 33 G/DL — SIGNIFICANT CHANGE UP (ref 32–37)
MCV RBC AUTO: 84.8 FL — SIGNIFICANT CHANGE UP (ref 81–99)
MCV RBC AUTO: 86.5 FL — SIGNIFICANT CHANGE UP (ref 81–99)
MONOCYTES # BLD AUTO: 1 K/UL — HIGH (ref 0.1–0.6)
MONOCYTES NFR BLD AUTO: 17.8 % — HIGH (ref 1.7–9.3)
NEUTROPHILS # BLD AUTO: 1.36 K/UL — LOW (ref 1.4–6.5)
NEUTROPHILS NFR BLD AUTO: 24.2 % — LOW (ref 42.2–75.2)
NRBC # BLD: 0 /100 WBCS — SIGNIFICANT CHANGE UP (ref 0–0)
NRBC # BLD: 0 /100 WBCS — SIGNIFICANT CHANGE UP (ref 0–0)
PLATELET # BLD AUTO: 181 K/UL — SIGNIFICANT CHANGE UP (ref 130–400)
PLATELET # BLD AUTO: 186 K/UL — SIGNIFICANT CHANGE UP (ref 130–400)
POTASSIUM SERPL-MCNC: 4.5 MMOL/L — SIGNIFICANT CHANGE UP (ref 3.5–5)
POTASSIUM SERPL-SCNC: 4.5 MMOL/L — SIGNIFICANT CHANGE UP (ref 3.5–5)
RBC # BLD: 4.15 M/UL — LOW (ref 4.2–5.4)
RBC # BLD: 4.22 M/UL — SIGNIFICANT CHANGE UP (ref 4.2–5.4)
RBC # FLD: 13.2 % — SIGNIFICANT CHANGE UP (ref 11.5–14.5)
RBC # FLD: 13.4 % — SIGNIFICANT CHANGE UP (ref 11.5–14.5)
SODIUM SERPL-SCNC: 138 MMOL/L — SIGNIFICANT CHANGE UP (ref 135–146)
WBC # BLD: 5.01 K/UL — SIGNIFICANT CHANGE UP (ref 4.8–10.8)
WBC # BLD: 5.62 K/UL — SIGNIFICANT CHANGE UP (ref 4.8–10.8)
WBC # FLD AUTO: 5.01 K/UL — SIGNIFICANT CHANGE UP (ref 4.8–10.8)
WBC # FLD AUTO: 5.62 K/UL — SIGNIFICANT CHANGE UP (ref 4.8–10.8)

## 2018-06-12 RX ORDER — DEXAMETHASONE 0.5 MG/5ML
4 ELIXIR ORAL EVERY 4 HOURS
Qty: 0 | Refills: 0 | Status: DISCONTINUED | OUTPATIENT
Start: 2018-06-12 | End: 2018-06-12

## 2018-06-12 RX ORDER — RIVAROXABAN 15 MG-20MG
20 KIT ORAL EVERY 24 HOURS
Qty: 0 | Refills: 0 | Status: DISCONTINUED | OUTPATIENT
Start: 2018-06-12 | End: 2018-06-26

## 2018-06-12 RX ORDER — ALPRAZOLAM 0.25 MG
2 TABLET ORAL THREE TIMES A DAY
Qty: 0 | Refills: 0 | Status: DISCONTINUED | OUTPATIENT
Start: 2018-06-12 | End: 2018-06-19

## 2018-06-12 RX ORDER — DEXAMETHASONE 0.5 MG/5ML
4 ELIXIR ORAL EVERY 8 HOURS
Qty: 0 | Refills: 0 | Status: COMPLETED | OUTPATIENT
Start: 2018-06-12 | End: 2018-06-13

## 2018-06-12 RX ORDER — MORPHINE SULFATE 50 MG/1
200 CAPSULE, EXTENDED RELEASE ORAL EVERY 8 HOURS
Qty: 0 | Refills: 0 | Status: DISCONTINUED | OUTPATIENT
Start: 2018-06-12 | End: 2018-06-19

## 2018-06-12 RX ORDER — DEXAMETHASONE 0.5 MG/5ML
4 ELIXIR ORAL EVERY 8 HOURS
Qty: 0 | Refills: 0 | Status: DISCONTINUED | OUTPATIENT
Start: 2018-06-12 | End: 2018-06-12

## 2018-06-12 RX ORDER — DEXAMETHASONE 0.5 MG/5ML
10 ELIXIR ORAL ONCE
Qty: 0 | Refills: 0 | Status: COMPLETED | OUTPATIENT
Start: 2018-06-12 | End: 2018-06-12

## 2018-06-12 RX ORDER — HEPARIN SODIUM 5000 [USP'U]/ML
700 INJECTION INTRAVENOUS; SUBCUTANEOUS
Qty: 25000 | Refills: 0 | Status: DISCONTINUED | OUTPATIENT
Start: 2018-06-12 | End: 2018-06-12

## 2018-06-12 RX ADMIN — MORPHINE SULFATE 200 MILLIGRAM(S): 50 CAPSULE, EXTENDED RELEASE ORAL at 06:58

## 2018-06-12 RX ADMIN — MORPHINE SULFATE 200 MILLIGRAM(S): 50 CAPSULE, EXTENDED RELEASE ORAL at 21:41

## 2018-06-12 RX ADMIN — PANTOPRAZOLE SODIUM 40 MILLIGRAM(S): 20 TABLET, DELAYED RELEASE ORAL at 05:25

## 2018-06-12 RX ADMIN — MORPHINE SULFATE 200 MILLIGRAM(S): 50 CAPSULE, EXTENDED RELEASE ORAL at 06:32

## 2018-06-12 RX ADMIN — GABAPENTIN 200 MILLIGRAM(S): 400 CAPSULE ORAL at 18:08

## 2018-06-12 RX ADMIN — Medication 100 MILLIGRAM(S): at 21:42

## 2018-06-12 RX ADMIN — Medication 100 MILLIGRAM(S): at 13:37

## 2018-06-12 RX ADMIN — Medication 2 MILLIGRAM(S): at 21:41

## 2018-06-12 RX ADMIN — Medication 10 MILLIGRAM(S): at 15:56

## 2018-06-12 RX ADMIN — Medication 10 MILLIGRAM(S): at 12:26

## 2018-06-12 RX ADMIN — GABAPENTIN 200 MILLIGRAM(S): 400 CAPSULE ORAL at 05:25

## 2018-06-12 RX ADMIN — Medication 4 MILLIGRAM(S): at 23:03

## 2018-06-12 RX ADMIN — RIVAROXABAN 20 MILLIGRAM(S): KIT at 18:09

## 2018-06-12 RX ADMIN — MICAFUNGIN SODIUM 105 MILLIGRAM(S): 100 INJECTION, POWDER, LYOPHILIZED, FOR SOLUTION INTRAVENOUS at 18:08

## 2018-06-12 RX ADMIN — Medication 500000 UNIT(S): at 23:03

## 2018-06-12 RX ADMIN — MORPHINE SULFATE 200 MILLIGRAM(S): 50 CAPSULE, EXTENDED RELEASE ORAL at 13:38

## 2018-06-12 RX ADMIN — Medication 500000 UNIT(S): at 13:36

## 2018-06-12 RX ADMIN — Medication 102 MILLIGRAM(S): at 15:55

## 2018-06-12 RX ADMIN — Medication 1 MILLIGRAM(S): at 12:26

## 2018-06-12 RX ADMIN — Medication 500000 UNIT(S): at 18:08

## 2018-06-12 RX ADMIN — Medication 500000 UNIT(S): at 00:04

## 2018-06-12 RX ADMIN — Medication 500000 UNIT(S): at 05:27

## 2018-06-12 RX ADMIN — Medication 25 MILLIGRAM(S): at 18:09

## 2018-06-12 RX ADMIN — Medication 25 MILLIGRAM(S): at 05:26

## 2018-06-12 RX ADMIN — Medication 10 MILLIGRAM(S): at 05:25

## 2018-06-12 RX ADMIN — MORPHINE SULFATE 200 MILLIGRAM(S): 50 CAPSULE, EXTENDED RELEASE ORAL at 22:11

## 2018-06-12 NOTE — PROGRESS NOTE ADULT - ASSESSMENT
48 y/o F with significant PMH of CHF s/p AICD+PPM, DVT on xarelto, COPD, RA, laryngeal cancer s/p excision in november, s/p  2 week history of esophageal thrush 4 weeks ago status post PO diflucan treatment.     # Worsening Laryngeal Candidiasis, s/p EGD 5/15/2018  modified diet  changed to IV micafungin with ID guidance  appreciated ENT f/u, giving empiric steroid for edema observed  no evidence of stridor or respiratory compromise     2. Throat Cancer s/p excision in Nov 2017; as per pt she was put on CellCept for her LUPUS 2 years ago by her Rheumatologist  and stopped taking it in Feb2018. She said she is not on any other chemotherapy.   hem/onc f/u    TIAN on CKD2  marginal improvement  fena consistent with intrinsic renal injury  appreciated renal c/s  bladder scan showed no pvr  f/u renal us  f/u cr, renal f/u  amphotericin stopped    #LUE thrombophlebitis  elevation, warm compress  acute dvt was r/o      3. COPD exacerbation - resolved  - c/w duonebs q4+prn,   - hold off on steroids   bronchodilators    4. chronic systolic heart failure s/p AICD+PPM:   device interrogation  lung bases clear, not requiring supplemental O2, no evidence of exacerbation    5. HTN - stable  - c/w toprol, hydralazine    6 RA - controlled  - c/w outpt benlysta + methotrexate shots  - c/w folic acid supplementation    7. Chronic Pain 2/2 Multiple Back Sx  - c/w MS contin   - c/w dilaudid PRN  - c/w gabapentin    8. Anxiety  - c/w xanax PRN resumed home doseage  - c/w trazodone    9. hyperkalemia - resolved    chronic DVT on AC- now back on home NOAC as renal function improving    GI PPx: protonix

## 2018-06-12 NOTE — DIETITIAN INITIAL EVALUATION ADULT. - ENERGY NEEDS
Calories: 1545-1686kcals/day (MSJ-A.F 1.1-1.2)  Protein: 52-63g/day (1-1.2g/IBW)  Fluid: 1:1 ml/kcal or fluid restriction as per LIP

## 2018-06-12 NOTE — DIETITIAN INITIAL EVALUATION ADULT. - FACTORS AFF FOOD INTAKE
difficulty swallowing/Pt has laryngeal cancer. Sometimes foods irritate her throat. Example: hard/crunchy outter layer of chicken fingers. Pt is aware of what foods bother her. Does not wish to be placed on a mod consistency diet.

## 2018-06-12 NOTE — DIETITIAN INITIAL EVALUATION ADULT. - PHYSICAL APPEARANCE
BMI 31.2 (ht 5'3, 80.1kg). Nando Score 22. Skin intact. Edema 2+ present on left and right foot/obese

## 2018-06-12 NOTE — DIETITIAN INITIAL EVALUATION ADULT. - OTHER INFO
Reason for Assessment: LOS. Pt had a recent admit (5/7-5/18) for fatigue, weakness, and odynophagia and was found to have laryngeal candidiasis. Pt is currently admitted for worsening laryngeal candidiasis and is receiving 10 days of IV antibiotics. PMHx: CHF s/p AICD and PPM, DVT, COPD, RA, laryngeal CA, HTN, and lupus. Pt reports a fair appetite. EMR reports a % intake with one poor PO documented on (6/6) 25%. Pt reports a stable wt of 150lbs and the Pt reports that she weighs herself everyday so she is very surprised that the bedscale wt reports her wt to be 176lbs. Pt reports episodes of vomiting. Last episode was last night 6/11. Last BM was yesterday 6/11.

## 2018-06-12 NOTE — PROGRESS NOTE ADULT - SUBJECTIVE AND OBJECTIVE BOX
ILEANA MCNEIL  47y  Female      Patient is a 47y old  Female who presents with a chief complaint of worsening laryngeal candidiasis (04 Jun 2018 20:08)      INTERVAL HPI/OVERNIGHT EVENTS: continues to c/o hoarseness and b/l LE swelling as prior. ambulating      REVIEW OF SYSTEMS:  as above  All other review of systems negative    T(C): 36.4 (06-12-18 @ 13:54), Max: 37.1 (06-11-18 @ 21:17)  HR: 79 (06-12-18 @ 13:54) (71 - 79)  BP: 146/64 (06-12-18 @ 13:54) (83/37 - 146/64)  RR: 18 (06-12-18 @ 13:54) (18 - 18)  SpO2: --  Wt(kg): --Vital Signs Last 24 Hrs  T(C): 36.4 (12 Jun 2018 13:54), Max: 37.1 (11 Jun 2018 21:17)  T(F): 97.5 (12 Jun 2018 13:54), Max: 98.7 (11 Jun 2018 21:17)  HR: 79 (12 Jun 2018 13:54) (71 - 79)  BP: 146/64 (12 Jun 2018 13:54) (83/37 - 146/64)  BP(mean): --  RR: 18 (12 Jun 2018 13:54) (18 - 18)  SpO2: --      06-11-18 @ 07:01  -  06-12-18 @ 07:00  --------------------------------------------------------  IN: 0 mL / OUT: 640 mL / NET: -640 mL        PHYSICAL EXAM:  GENERAL: NAD  PSYCH: no agitation, baseline mentation  HEENT hoarse  NERVOUS SYSTEM:  Alert & Oriented X3, no new focal deficits  PULMONARY: Clear to percussion bilaterally; No rales, rhonchi, wheezing, or rubs  CARDIOVASCULAR: Regular rate and rhythm; No murmurs, rubs, or gallops  GI: Soft, Nontender, Nondistended; Bowel sounds present   no cvat b/l  EXTREMITIES:  increasing nonpitting edema b/l LE    Consultant(s) Notes Reviewed:  [x ] YES  [ ] NO    Discussed with Consultants/Other Providers [ x] YES     LABS                          11.8   5.01  )-----------( 181      ( 12 Jun 2018 12:41 )             35.8     06-12    138  |  97<L>  |  19  ----------------------------<  106<H>  4.5   |  27  |  1.4    Ca    9.4      12 Jun 2018 06:19  Mg     1.8     06-12          PTT - ( 12 Jun 2018 12:41 )  PTT:114.4 sec  Lactate Trend        CAPILLARY BLOOD GLUCOSE            RADIOLOGY & ADDITIONAL TESTS:    Imaging Personally Reviewed:  [ ] YES  [ ] NO    HEALTH ISSUES - PROBLEM Dx:

## 2018-06-12 NOTE — PROGRESS NOTE ADULT - ASSESSMENT
48 y/o F with significant PMH of Lupus, CHF s/p AICD+PPM, DVT on xarelto, COPD, RA, laryngeal cancer s/p excision in november, s/p  2 week history of esophageal thrush 4 weeks ago status post PO diflucan treatment.     # Hemoptysis - resolved on 6/8    # TIAN likely 2/2 to prerenal as per renal recs  - amphotericin stopped  - c/w to monitor BMP for Cr,   - urine studies--> FeNa 1.3 (intrinsic renal injury)  - gently hydrated, will stop for c/o edema  - check bladder scan for PVR as per renal recs    # Worsening Laryngeal Candidiasis, s/p EGD 5/15/2018  - c/w IV micafungin while inpatient, Itraconazole 200mg PO BID x 7D outpatient as per ID recs  - start decadron 10mg once followed by 4mg 8hrs later X2 doses - as per ENT recs   - f/u BMP, Cr, CBC daily  - Monitor Mg2+ and replete as needed  - check daily weights.   - c/w swish and swallow Nystatin as per ENT recs  - HIV negative (5/10)  - f/u CXR --> no ACS   - f/u blood cultures - negative    # Throat Cancer s/p excision in Nov 2017; as per pt she was put on CellCept for her LUPUS 2 years ago by her Rheumatologist  and stopped taking it in Feb2018. She said she is not on any other chemotherapy.   - Heme/onc consult ordered --> f/u with her doctor outpatient; pt was seen by Heme/Onc () on last admission    #LUE pustule  - f/u US duplex showed superficial thrombosis left cephalic vein in the forearm, No evidence of DVT  - monitor for fever curve or worsening erythema if spikes fever or spreads will empirically cover with abx (ex clinda)    # COPD exacerbation - resolved  - c/w duonebs q4+prn,   - hold off on steroids  - resume home breo    # CHF s/p AICD+PPM:   - echo done 6/7 showed LVEF 65%     # Acute DVT  - c/w with Hep drip given TIAN while on liposomal amphotericin  - f/u PTT and dose Hep    # HTN - stable  - c/w toprol,   - d/c hydralazine    # RA - controlled  - c/w outpt benlysta + methotrexate shots  - c/w folic acid supplementation    # Chronic Pain 2/2 Multiple Back Sx  - c/w MS contin   - c/w dilaudid PRN  - c/w gabapentin    # Anxiety  - c/w xanax PRN  - c/w trazodone    # hyperkalemia - resolved    # ckd 2 - controlled  - monitor BUN, Cr    DVT PPX: on Hep drip  GI PPx: protonix  Dispo: Home after medically stable 46 y/o F with significant PMH of Lupus, CHF s/p AICD+PPM, DVT on xarelto, COPD, RA, laryngeal cancer s/p excision in november, s/p  2 week history of esophageal thrush 4 weeks ago status post PO diflucan treatment.     # Hemoptysis - resolved on 6/8    # TIAN likely 2/2 to prerenal as per renal recs  - amphotericin stopped  - c/w to monitor BMP for Cr,   - urine studies--> FeNa 1.3 (intrinsic renal injury)  - gently hydrated, will stop for c/o edema  - check bladder scan for PVR as per renal recs    # Worsening Laryngeal Candidiasis, s/p EGD 5/15/2018  - c/w IV micafungin while inpatient, Itraconazole 200mg PO BID x 7D outpatient as per ID recs  - start decadron 10mg once followed by 4mg 8hrs later X2 doses - as per ENT recs   - f/u BMP, Cr, CBC daily  - Monitor Mg2+ and replete as needed  - check daily weights.   - c/w swish and swallow Nystatin as per ENT recs  - HIV negative (5/10)  - f/u CXR --> no ACS   - f/u blood cultures - negative    # Throat Cancer s/p excision in Nov 2017; as per pt she was put on CellCept for her LUPUS 2 years ago by her Rheumatologist  and stopped taking it in Feb2018. She said she is not on any other chemotherapy.   - Heme/onc consult ordered --> f/u with her doctor outpatient; pt was seen by Heme/Onc () on last admission    #LUE pustule  - f/u US duplex showed superficial thrombosis left cephalic vein in the forearm, No evidence of DVT  - monitor for fever curve or worsening erythema if spikes fever or spreads will empirically cover with abx (ex clinda)    # COPD exacerbation - resolved  - c/w duonebs q4+prn,   - hold off on steroids  - resume home breo    # CHF s/p AICD+PPM:   - echo done 6/7 showed LVEF 65%     # Acute DVT  - was placed on Hep drip given TIAN while on liposomal amphotericin  - d/c heparin given improving renal function, restart Xarelto this evening    # HTN - stable  - c/w toprol,   - d/c hydralazine    # RA - controlled  - c/w outpt benlysta + methotrexate shots  - c/w folic acid supplementation    # Chronic Pain 2/2 Multiple Back Sx  - c/w MS contin   - c/w dilaudid PRN  - c/w gabapentin    # Anxiety  - c/w xanax PRN  - c/w trazodone    # hyperkalemia - resolved    # ckd 2 - controlled  - monitor BUN, Cr    DVT PPX: on Hep drip  GI PPx: protonix  Dispo: Home after medically stable

## 2018-06-12 NOTE — PROGRESS NOTE ADULT - SUBJECTIVE AND OBJECTIVE BOX
SUBJECTIVE:    Patient is a 47y old  Female who presents with a chief complaint of worsening laryngeal candidiasis     Currently admitted to medicine with the primary diagnosis of Candida esophagitis     Patient was seen at bedside this morning. Patient was in no acute distress but did c/o of weakness and tired. She reports she still has swelling in her legs and feels her eyes swollen. She reports she is having trouble urinating.     PAST MEDICAL & SURGICAL HISTORY  PAST MEDICAL & SURGICAL HISTORY:  DVT (deep venous thrombosis)  COPD (chronic obstructive pulmonary disease)  CHF (congestive heart failure)  HTN (hypertension)  RA (rheumatoid arthritis)  Throat cancer  AICD (automatic cardioverter/defibrillator) present  Lupus  History of laryngeal cancer  S/P cholecystectomy  S/P appendectomy    SOCIAL HISTORY:    ALLERGIES:  Avelox (Other)  Plaquenil (Other)  Vantin (Other (Mild))    MEDICATIONS:  STANDING MEDICATIONS  dexamethasone  IVPB 10 milliGRAM(s) IV Intermittent once  docusate sodium 100 milliGRAM(s) Oral three times a day  fluticasone furoate/vilanterol 100-25 MICROgram(s) Inhaler 1 Puff(s) Inhalation daily  folic acid 1 milliGRAM(s) Oral daily  gabapentin 200 milliGRAM(s) Oral two times a day  heparin  Infusion 700 Unit(s)/Hr IV Continuous <Continuous>  hydrOXYzine hydrochloride 25 milliGRAM(s) Oral two times a day  metoclopramide 10 milliGRAM(s) Oral three times a day before meals  metoprolol succinate ER 50 milliGRAM(s) Oral daily  micafungin IVPB 100 milliGRAM(s) IV Intermittent every 24 hours  morphine ER Tablet 200 milliGRAM(s) Oral every 8 hours  nystatin    Suspension 264930 Unit(s) Oral four times a day  pantoprazole    Tablet 40 milliGRAM(s) Oral before breakfast  senna 2 Tablet(s) Oral at bedtime  traZODone 100 milliGRAM(s) Oral at bedtime    PRN MEDICATIONS  ALBUTerol/ipratropium for Nebulization 3 milliLiter(s) Nebulizer every 6 hours PRN    VITALS:   T(F): 98.2  HR: 74  BP: 141/68  RR: 18  SpO2: --    HOME MEDS: </u  Benlysta: 200 milligram(s) intravenous once a week  Breo Ellipta 100 mcg-25 mcg/inh inhalation powder: 1 puff(s) inhaled once a day  Dilaudid 8 mg oral tablet: 1 tab(s) orally every 4 hours, As Needed  folic acid 1 mg oral tablet: 1 tab(s) orally once a day  gabapentin 300 mg oral tablet: orally 2 times a day  hydrALAZINE 50 mg oral tablet: orally every 8 hours  itraconazole 200 mg oral tablet: 1 tab(s) orally every 12 hours   methotrexate: injectable once a week  MS Contin 200 mg oral tablet, extended release: 1 tab(s) orally every 8 hours  Prevacid 30 mg oral delayed release capsule: 1 cap(s) orally once a day  ProAir HFA 90 mcg/inh inhalation aerosol: 2 puff(s) inhaled 4 times a day, As Needed  Reglan 10 mg oral tablet: 1 tab(s) orally 3 times a day (before meals)  Toprol-XL 50 mg oral tablet, extended release: 1 tab(s) orally once a day  traZODone 100 mg oral tablet: 1 tab(s) orally once a day (at bedtime)  Vistaril 50 mg oral capsule: orally 2 times a day  Vitamin B12 1000 mcg/mL injectable solution: injectable 2 times a week  Xanax 1 mg oral tablet: orally every 4 hours, As Needed  Xarelto 20 mg oral tablet: 1 tab(s) orally once a day (in the evening)      LABS:                        10.9   5.11  )-----------( 159      ( 11 Jun 2018 06:21 )             33.3     06-11    137  |  100  |  20  ----------------------------<  94  4.1   |  26  |  1.5    Ca    8.9      11 Jun 2018 06:21  Mg     1.7     06-11      PTT - ( 11 Jun 2018 21:38 )  PTT:154.6 sec    RADIOLOGY:    PHYSICAL EXAM:  GEN: No acute distress  HEENT: No swelling around the eyes, no mucosal erosions or spots noted  LUNGS: Clear to auscultation bilaterally   HEART: S1/S2 present. RRR.   ABD: Soft, non-tender, non-distended. Bowel sounds present  EXT: Swollen legs, improved from yesterday  NEURO: AAOX3

## 2018-06-13 LAB
ANION GAP SERPL CALC-SCNC: 19 MMOL/L — HIGH (ref 7–14)
BASOPHILS # BLD AUTO: 0.01 K/UL — SIGNIFICANT CHANGE UP (ref 0–0.2)
BASOPHILS NFR BLD AUTO: 0.2 % — SIGNIFICANT CHANGE UP (ref 0–1)
BUN SERPL-MCNC: 23 MG/DL — HIGH (ref 10–20)
CALCIUM SERPL-MCNC: 9.5 MG/DL — SIGNIFICANT CHANGE UP (ref 8.5–10.1)
CHLORIDE SERPL-SCNC: 95 MMOL/L — LOW (ref 98–110)
CO2 SERPL-SCNC: 22 MMOL/L — SIGNIFICANT CHANGE UP (ref 17–32)
CREAT SERPL-MCNC: 1.3 MG/DL — SIGNIFICANT CHANGE UP (ref 0.7–1.5)
EOSINOPHIL # BLD AUTO: 0 K/UL — SIGNIFICANT CHANGE UP (ref 0–0.7)
EOSINOPHIL NFR BLD AUTO: 0 % — SIGNIFICANT CHANGE UP (ref 0–8)
GLUCOSE SERPL-MCNC: 120 MG/DL — HIGH (ref 70–99)
HCT VFR BLD CALC: 33.2 % — LOW (ref 37–47)
HGB BLD-MCNC: 11 G/DL — LOW (ref 12–16)
IMM GRANULOCYTES NFR BLD AUTO: 0.2 % — SIGNIFICANT CHANGE UP (ref 0.1–0.3)
LYMPHOCYTES # BLD AUTO: 0.87 K/UL — LOW (ref 1.2–3.4)
LYMPHOCYTES # BLD AUTO: 21.4 % — SIGNIFICANT CHANGE UP (ref 20.5–51.1)
MAGNESIUM SERPL-MCNC: 1.8 MG/DL — SIGNIFICANT CHANGE UP (ref 1.8–2.4)
MCHC RBC-ENTMCNC: 28 PG — SIGNIFICANT CHANGE UP (ref 27–31)
MCHC RBC-ENTMCNC: 33.1 G/DL — SIGNIFICANT CHANGE UP (ref 32–37)
MCV RBC AUTO: 84.5 FL — SIGNIFICANT CHANGE UP (ref 81–99)
MONOCYTES # BLD AUTO: 0.25 K/UL — SIGNIFICANT CHANGE UP (ref 0.1–0.6)
MONOCYTES NFR BLD AUTO: 6.2 % — SIGNIFICANT CHANGE UP (ref 1.7–9.3)
NEUTROPHILS # BLD AUTO: 2.92 K/UL — SIGNIFICANT CHANGE UP (ref 1.4–6.5)
NEUTROPHILS NFR BLD AUTO: 72 % — SIGNIFICANT CHANGE UP (ref 42.2–75.2)
NRBC # BLD: 0 /100 WBCS — SIGNIFICANT CHANGE UP (ref 0–0)
PLATELET # BLD AUTO: 169 K/UL — SIGNIFICANT CHANGE UP (ref 130–400)
POTASSIUM SERPL-MCNC: 4.6 MMOL/L — SIGNIFICANT CHANGE UP (ref 3.5–5)
POTASSIUM SERPL-SCNC: 4.6 MMOL/L — SIGNIFICANT CHANGE UP (ref 3.5–5)
RBC # BLD: 3.93 M/UL — LOW (ref 4.2–5.4)
RBC # FLD: 12.9 % — SIGNIFICANT CHANGE UP (ref 11.5–14.5)
SODIUM SERPL-SCNC: 136 MMOL/L — SIGNIFICANT CHANGE UP (ref 135–146)
WBC # BLD: 4.06 K/UL — LOW (ref 4.8–10.8)
WBC # FLD AUTO: 4.06 K/UL — LOW (ref 4.8–10.8)

## 2018-06-13 PROCEDURE — 92610 EVALUATE SWALLOWING FUNCTION: CPT | Mod: 22

## 2018-06-13 PROCEDURE — 92612 ENDOSCOPY SWALLOW (FEES) VID: CPT | Mod: 22

## 2018-06-13 PROCEDURE — 99233 SBSQ HOSP IP/OBS HIGH 50: CPT | Mod: 25

## 2018-06-13 RX ORDER — ONDANSETRON 8 MG/1
4 TABLET, FILM COATED ORAL EVERY 6 HOURS
Qty: 0 | Refills: 0 | Status: DISCONTINUED | OUTPATIENT
Start: 2018-06-13 | End: 2018-06-22

## 2018-06-13 RX ADMIN — MORPHINE SULFATE 200 MILLIGRAM(S): 50 CAPSULE, EXTENDED RELEASE ORAL at 13:29

## 2018-06-13 RX ADMIN — MORPHINE SULFATE 200 MILLIGRAM(S): 50 CAPSULE, EXTENDED RELEASE ORAL at 06:13

## 2018-06-13 RX ADMIN — Medication 100 MILLIGRAM(S): at 13:30

## 2018-06-13 RX ADMIN — Medication 2 MILLIGRAM(S): at 06:13

## 2018-06-13 RX ADMIN — ONDANSETRON 4 MILLIGRAM(S): 8 TABLET, FILM COATED ORAL at 17:23

## 2018-06-13 RX ADMIN — Medication 25 MILLIGRAM(S): at 06:13

## 2018-06-13 RX ADMIN — FLUTICASONE FUROATE AND VILANTEROL TRIFENATATE 1 PUFF(S): 100; 25 POWDER RESPIRATORY (INHALATION) at 08:52

## 2018-06-13 RX ADMIN — Medication 500000 UNIT(S): at 12:07

## 2018-06-13 RX ADMIN — GABAPENTIN 200 MILLIGRAM(S): 400 CAPSULE ORAL at 18:44

## 2018-06-13 RX ADMIN — RIVAROXABAN 20 MILLIGRAM(S): KIT at 18:44

## 2018-06-13 RX ADMIN — PANTOPRAZOLE SODIUM 40 MILLIGRAM(S): 20 TABLET, DELAYED RELEASE ORAL at 06:13

## 2018-06-13 RX ADMIN — Medication 10 MILLIGRAM(S): at 06:13

## 2018-06-13 RX ADMIN — GABAPENTIN 200 MILLIGRAM(S): 400 CAPSULE ORAL at 06:13

## 2018-06-13 RX ADMIN — Medication 50 MILLIGRAM(S): at 06:13

## 2018-06-13 RX ADMIN — Medication 4 MILLIGRAM(S): at 06:14

## 2018-06-13 RX ADMIN — Medication 10 MILLIGRAM(S): at 12:07

## 2018-06-13 RX ADMIN — MORPHINE SULFATE 200 MILLIGRAM(S): 50 CAPSULE, EXTENDED RELEASE ORAL at 09:25

## 2018-06-13 RX ADMIN — Medication 100 MILLIGRAM(S): at 21:50

## 2018-06-13 RX ADMIN — Medication 100 MILLIGRAM(S): at 21:51

## 2018-06-13 RX ADMIN — MORPHINE SULFATE 200 MILLIGRAM(S): 50 CAPSULE, EXTENDED RELEASE ORAL at 21:54

## 2018-06-13 RX ADMIN — MICAFUNGIN SODIUM 105 MILLIGRAM(S): 100 INJECTION, POWDER, LYOPHILIZED, FOR SOLUTION INTRAVENOUS at 17:23

## 2018-06-13 RX ADMIN — MORPHINE SULFATE 200 MILLIGRAM(S): 50 CAPSULE, EXTENDED RELEASE ORAL at 22:31

## 2018-06-13 RX ADMIN — Medication 10 MILLIGRAM(S): at 18:45

## 2018-06-13 RX ADMIN — Medication 2 MILLIGRAM(S): at 21:54

## 2018-06-13 RX ADMIN — ONDANSETRON 4 MILLIGRAM(S): 8 TABLET, FILM COATED ORAL at 09:40

## 2018-06-13 RX ADMIN — Medication 1 MILLIGRAM(S): at 12:07

## 2018-06-13 RX ADMIN — MORPHINE SULFATE 200 MILLIGRAM(S): 50 CAPSULE, EXTENDED RELEASE ORAL at 14:35

## 2018-06-13 RX ADMIN — SENNA PLUS 2 TABLET(S): 8.6 TABLET ORAL at 21:50

## 2018-06-13 RX ADMIN — Medication 500000 UNIT(S): at 06:14

## 2018-06-13 RX ADMIN — Medication 2 MILLIGRAM(S): at 13:29

## 2018-06-13 RX ADMIN — Medication 25 MILLIGRAM(S): at 18:44

## 2018-06-13 NOTE — PROGRESS NOTE ADULT - SUBJECTIVE AND OBJECTIVE BOX
Pt is a 47 y.o female known well to ENT service for h/o laryngeal CA and multiple surgeries to VC, called to re-eval larynx for recurrent candidal infections - scoped on Monday, showing diffuse edema.  PT seen and examined at bedside, doing well. PT c/o decrease saliva/dry mouth as well as difficulty with initiating swallow. PT denies any coughing/choking episodes, has been seen by SLP inhouse - recommended OP F/U. PT denies any throat pain, difficulty breathing or SOB.    MEDICATIONS  (STANDING):  ALPRAZolam 2 milliGRAM(s) Oral three times a day  docusate sodium 100 milliGRAM(s) Oral three times a day  fluticasone furoate/vilanterol 100-25 MICROgram(s) Inhaler 1 Puff(s) Inhalation daily  folic acid 1 milliGRAM(s) Oral daily  gabapentin 200 milliGRAM(s) Oral two times a day  hydrOXYzine hydrochloride 25 milliGRAM(s) Oral two times a day  metoclopramide 10 milliGRAM(s) Oral three times a day before meals  metoprolol succinate ER 50 milliGRAM(s) Oral daily  micafungin IVPB 100 milliGRAM(s) IV Intermittent every 24 hours  morphine ER Tablet 200 milliGRAM(s) Oral every 8 hours  nystatin    Suspension 608243 Unit(s) Oral four times a day  pantoprazole    Tablet 40 milliGRAM(s) Oral before breakfast  rivaroxaban 20 milliGRAM(s) Oral every 24 hours  senna 2 Tablet(s) Oral at bedtime  traZODone 100 milliGRAM(s) Oral at bedtime    Vital Signs: T(F): 97.3 (13 Jun 2018 05:38), Max: 97.6 (12 Jun 2018 20:23), HR: 75, BP: 128/57, RR: 18  GEN: NAD, awake and alert. hoarse voice, although improved from Monday's exam  HEENT: NC/AT, oral mucosa pink, dry. no white plaques noted. neck supple, no TTP over b/l lateral neck                          11.0   4.06  )-----------( 169      ( 13 Jun 2018 05:16 )             33.2     06-12    138  |  97<L>  |  19  ----------------------------<  106<H>  4.5   |  27  |  1.4    Ca    9.4      12 Jun 2018 06:19  Mg     1.8     06-12

## 2018-06-13 NOTE — PROGRESS NOTE ADULT - ASSESSMENT
48 y/o F with significant PMH of Lupus, CHF s/p AICD+PPM, DVT on xarelto, COPD, RA, laryngeal cancer s/p excision in november, s/p  2 week history of esophageal thrush 4 weeks ago status post PO diflucan treatment.     # Hemoptysis - resolved on 6/8    # TIAN likely 2/2 to prerenal as per renal recs - resolved (1.4 on 6/13)  - amphotericin stopped  - c/w to monitor BMP for Cr,   - urine studies--> FeNa 1.3 (intrinsic renal injury)  - gently hydrated, will stop for c/o edema    # Worsening Laryngeal Candidiasis, s/p EGD 5/15/2018  - c/w IV micafungin while inpatient, Itraconazole 200mg PO BID x 7D outpatient as per ID recs  - d/c decadron 10mg once followed by 4mg 8hrs later X2 doses - as per ENT recs   - f/u ENT - will do FFL bedside today as per recs,   - pt will need outpt f/u with TNG for barium and exercise as per ENT note (6/13)  - f/u BMP, Cr, CBC daily  - Monitor Mg2+ and replete as needed  - check daily weights.   - c/w swish and swallow Nystatin as per ENT recs  - HIV negative (5/10)  - f/u repeat CXR for increased coughing ---> pending  - f/u blood cultures - negative    # Throat Cancer s/p excision in Nov 2017; as per pt she was put on CellCept for her LUPUS 2 years ago by her Rheumatologist  and stopped taking it in Feb2018. She said she is not on any other chemotherapy.   - Heme/onc consult ordered --> f/u with her doctor outpatient; pt was seen by Heme/Onc () on last admission    #LUE pustule  - f/u US duplex showed superficial thrombosis left cephalic vein in the forearm, No evidence of DVT  - monitor for fever curve or worsening erythema if spikes fever or spreads will empirically cover with abx (ex clinda)    # COPD exacerbation - resolved  - c/w duonebs q4+prn,   - hold off on steroids  - resume home breo    # Chronic systolic--.> diastolic CHF s/p AICD+PPM:   - echo done 6/7 showed LVEF 65%     # Acute DVT  - was placed on Hep drip given TIAN while on liposomal amphotericin  - Heparin D/c (6/12 night), c/w pt on Xarelto     # HTN - stable  - c/w toprol,   - d/c hydralazine    # RA - controlled  - c/w outpt benlysta + methotrexate shots  - c/w folic acid supplementation    # Chronic Pain 2/2 Multiple Back Sx  - c/w MS contin   - c/w dilaudid PRN  - c/w gabapentin    # Anxiety  - c/w xanax PRN  - c/w trazodone    # hyperkalemia - resolved    # ckd 2 - controlled  - monitor BUN, Cr    DVT PPX: on Hep drip  GI PPx: protonix  Dispo: Home after medically stable

## 2018-06-13 NOTE — PROGRESS NOTE ADULT - ASSESSMENT
47 y.o female with h/o laryngeal CA, chronic laryngeal/esophageal yeast infection.    ·	will re-FFL today (pt also requesting)  ·	cont current meds  ·	s/w SLP- will come to bedside when FFL to trial some PO as pt still c/o swallowing issues, although pt needs OP F/U for a barium and exercises  ·	cont sialogogues   ·	w/d with attng, will follow 47 y.o female with h/o laryngeal CA, chronic laryngeal/esophageal yeast infection.    ·	will re-FFL today (pt also requesting)  ·	cont current meds  ·	s/w SLP- will come to bedside when FFL to trial some PO as pt still c/o swallowing issues, although pt needs OP F/U for a barium and exercises  ·	cont sialogogues   ·	w/d with attng, will follow    **ADDENDUM - 6/13/18 2:00 pm- Seen at bedside with Dr Hanna and SLP, FFL performed - edema resolving, no white plaques noted. Pt given thin liquid with green dye, some residue noted in the L pyriform sinus, cleared with multiple swallows. No overt signs of aspiration.

## 2018-06-13 NOTE — PROGRESS NOTE ADULT - SUBJECTIVE AND OBJECTIVE BOX
SUBJECTIVE:    Patient is a 47y old  Female who presents with a chief complaint of worsening laryngeal candidiasis     Currently admitted to medicine with the primary diagnosis of Candida esophagitis     Patient was seen at bedside this morning. patient was in no acute distress but did complain of increasing coughing since last night. She also reported that the edema in the LE are decreased.     PAST MEDICAL & SURGICAL HISTORY  PAST MEDICAL & SURGICAL HISTORY:  DVT (deep venous thrombosis)  COPD (chronic obstructive pulmonary disease)  CHF (congestive heart failure)  HTN (hypertension)  RA (rheumatoid arthritis)  Throat cancer  AICD (automatic cardioverter/defibrillator) present  Lupus  History of laryngeal cancer  S/P cholecystectomy  S/P appendectomy    SOCIAL HISTORY:    ALLERGIES:  Avelox (Other)  Plaquenil (Other)  Vantin (Other (Mild))    MEDICATIONS:  STANDING MEDICATIONS  ALPRAZolam 2 milliGRAM(s) Oral three times a day  docusate sodium 100 milliGRAM(s) Oral three times a day  fluticasone furoate/vilanterol 100-25 MICROgram(s) Inhaler 1 Puff(s) Inhalation daily  folic acid 1 milliGRAM(s) Oral daily  gabapentin 200 milliGRAM(s) Oral two times a day  guaiFENesin  milliGRAM(s) Oral every 12 hours  hydrOXYzine hydrochloride 25 milliGRAM(s) Oral two times a day  metoclopramide 10 milliGRAM(s) Oral three times a day before meals  metoprolol succinate ER 50 milliGRAM(s) Oral daily  micafungin IVPB 100 milliGRAM(s) IV Intermittent every 24 hours  morphine ER Tablet 200 milliGRAM(s) Oral every 8 hours  nystatin    Suspension 933566 Unit(s) Oral four times a day  pantoprazole    Tablet 40 milliGRAM(s) Oral before breakfast  rivaroxaban 20 milliGRAM(s) Oral every 24 hours  senna 2 Tablet(s) Oral at bedtime  traZODone 100 milliGRAM(s) Oral at bedtime    PRN MEDICATIONS  ALBUTerol/ipratropium for Nebulization 3 milliLiter(s) Nebulizer every 6 hours PRN  ondansetron Injectable 4 milliGRAM(s) IV Push every 6 hours PRN    VITALS:   T(F): 97.3  HR: 75  BP: 128/57  RR: 18  SpO2: 94%    HOME MEDS: </u  Benlysta: 200 milligram(s) intravenous once a week  Breo Ellipta 100 mcg-25 mcg/inh inhalation powder: 1 puff(s) inhaled once a day  Dilaudid 8 mg oral tablet: 1 tab(s) orally every 4 hours, As Needed  folic acid 1 mg oral tablet: 1 tab(s) orally once a day  gabapentin 300 mg oral tablet: orally 2 times a day  hydrALAZINE 50 mg oral tablet: orally every 8 hours  itraconazole 200 mg oral tablet: 1 tab(s) orally every 12 hours   methotrexate: injectable once a week  MS Contin 200 mg oral tablet, extended release: 1 tab(s) orally every 8 hours  Prevacid 30 mg oral delayed release capsule: 1 cap(s) orally once a day  ProAir HFA 90 mcg/inh inhalation aerosol: 2 puff(s) inhaled 4 times a day, As Needed  Reglan 10 mg oral tablet: 1 tab(s) orally 3 times a day (before meals)  Toprol-XL 50 mg oral tablet, extended release: 1 tab(s) orally once a day  traZODone 100 mg oral tablet: 1 tab(s) orally once a day (at bedtime)  Vistaril 50 mg oral capsule: orally 2 times a day  Vitamin B12 1000 mcg/mL injectable solution: injectable 2 times a week  Xanax 1 mg oral tablet: orally every 4 hours, As Needed  Xarelto 20 mg oral tablet: 1 tab(s) orally once a day (in the evening)      LABS:                        11.0   4.06  )-----------( 169      ( 13 Jun 2018 05:16 )             33.2     06-13    136  |  95<L>  |  23<H>  ----------------------------<  120<H>  4.6   |  22  |  1.3    Ca    9.5      13 Jun 2018 05:16  Mg     1.8     06-13      PTT - ( 12 Jun 2018 12:41 )  PTT:114.4 sec    RADIOLOGY:    PHYSICAL EXAM:  GEN: No acute distress  HEENT: No changes noted from previous day  LUNGS: Clear to auscultation bilaterally   HEART: S1/S2 present. RRR.   ABD: Soft, non-tender, non-distended. Bowel sounds present  EXT: improved LE edema b/l  NEURO: AAOX3

## 2018-06-13 NOTE — PROGRESS NOTE ADULT - ASSESSMENT
46 y/o F with significant PMH of Lupus, CHF s/p AICD+PPM, DVT on xarelto, COPD, RA, laryngeal cancer s/p excision in november, s/p  2 week history of esophageal thrush 4 weeks ago status post PO diflucan treatment.     # Hemoptysis - resolved on 6/8    # TINA likely 2/2 to prerenal as per renal recs - resolved  - amphotericin stopped  - c/w to monitor BMP for Cr,   - urine studies--> FeNa 1.3 (intrinsic renal injury)  - gently hydrated, will stop for c/o edema    # Worsening Laryngeal Candidiasis, s/p EGD 5/15/2018  - c/w IV micafungin while inpatient, Itraconazole 200mg PO BID x 7D outpatient as per ID recs  - d/c decadron 10mg once followed by 4mg 8hrs later X2 doses - as per ENT recs   - f/u ENT - will do FFL bedside today as per recs,   - pt will need outpt f/u with TNG for barium and exercise as per ENT note (6/13)  - f/u BMP, Cr, CBC daily  - Monitor Mg2+ and replete as needed  - check daily weights.   - c/w swish and swallow Nystatin as per ENT recs  - HIV negative (5/10)  - f/u repeat CXR for increased coughing ---> pending  - f/u blood cultures - negative    # Throat Cancer s/p excision in Nov 2017; as per pt she was put on CellCept for her LUPUS 2 years ago by her Rheumatologist  and stopped taking it in Feb2018. She said she is not on any other chemotherapy.   - Heme/onc consult ordered --> f/u with her doctor outpatient; pt was seen by Heme/Onc () on last admission    #LUE pustule  - f/u US duplex showed superficial thrombosis left cephalic vein in the forearm, No evidence of DVT  - monitor for fever curve or worsening erythema if spikes fever or spreads will empirically cover with abx (ex clinda)    # COPD exacerbation - resolved  - c/w duonebs q4+prn,   - hold off on steroids  - resume home breo    # CHF s/p AICD+PPM:   - echo done 6/7 showed LVEF 65%     # Acute DVT  - was placed on Hep drip given TIAN while on liposomal amphotericin  - Heparin D/c (6/12 night), c/w pt on Xarelto     # HTN - stable  - c/w toprol,   - d/c hydralazine    # RA - controlled  - c/w outpt benlysta + methotrexate shots  - c/w folic acid supplementation    # Chronic Pain 2/2 Multiple Back Sx  - c/w MS contin   - c/w dilaudid PRN  - c/w gabapentin    # Anxiety  - c/w xanax PRN  - c/w trazodone    # hyperkalemia - resolved    # ckd 2 - controlled  - monitor BUN, Cr    DVT PPX: on Hep drip  GI PPx: protonix  Dispo: Home after medically stable 48 y/o F with significant PMH of Lupus, CHF s/p AICD+PPM, DVT on xarelto, COPD, RA, laryngeal cancer s/p excision in november, s/p  2 week history of esophageal thrush 4 weeks ago status post PO diflucan treatment.     # Hemoptysis - resolved on 6/8    # TIAN likely 2/2 to prerenal as per renal recs - resolved (1.4 on 6/13)  - amphotericin stopped  - c/w to monitor BMP for Cr,   - urine studies--> FeNa 1.3 (intrinsic renal injury)  - gently hydrated, will stop for c/o edema    # Worsening Laryngeal Candidiasis, s/p EGD 5/15/2018  - c/w IV micafungin while inpatient, Itraconazole 200mg PO BID x 7D outpatient as per ID recs  - d/c decadron 10mg once followed by 4mg 8hrs later X2 doses - as per ENT recs   - f/u ENT - will do FFL bedside today as per recs,   - pt will need outpt f/u with TNG for barium and exercise as per ENT note (6/13)  - f/u BMP, Cr, CBC daily  - Monitor Mg2+ and replete as needed  - check daily weights.   - c/w swish and swallow Nystatin as per ENT recs  - HIV negative (5/10)  - f/u repeat CXR for increased coughing ---> pending  - f/u blood cultures - negative    # Throat Cancer s/p excision in Nov 2017; as per pt she was put on CellCept for her LUPUS 2 years ago by her Rheumatologist  and stopped taking it in Feb2018. She said she is not on any other chemotherapy.   - Heme/onc consult ordered --> f/u with her doctor outpatient; pt was seen by Heme/Onc () on last admission    #LUE pustule  - f/u US duplex showed superficial thrombosis left cephalic vein in the forearm, No evidence of DVT  - monitor for fever curve or worsening erythema if spikes fever or spreads will empirically cover with abx (ex clinda)    # COPD exacerbation - resolved  - c/w duonebs q4+prn,   - hold off on steroids  - resume home breo    # CHF s/p AICD+PPM:   - echo done 6/7 showed LVEF 65%     # Acute DVT  - was placed on Hep drip given TIAN while on liposomal amphotericin  - Heparin D/c (6/12 night), c/w pt on Xarelto     # HTN - stable  - c/w toprol,   - d/c hydralazine    # RA - controlled  - c/w outpt benlysta + methotrexate shots  - c/w folic acid supplementation    # Chronic Pain 2/2 Multiple Back Sx  - c/w MS contin   - c/w dilaudid PRN  - c/w gabapentin    # Anxiety  - c/w xanax PRN  - c/w trazodone    # hyperkalemia - resolved    # ckd 2 - controlled  - monitor BUN, Cr    DVT PPX: on Hep drip  GI PPx: protonix  Dispo: Home after medically stable

## 2018-06-13 NOTE — PROGRESS NOTE ADULT - SUBJECTIVE AND OBJECTIVE BOX
ILEANA MCNEIL  47y  Female      Patient is a 47y old  Female who presents with a chief complaint of worsening laryngeal candidiasis (04 Jun 2018 20:08)      INTERVAL HPI/OVERNIGHT EVENTS:      ******************************* REVIEW OF SYSTEMS:**********************************************      All other review of systems negative    *********************** VITALS ******************************************    T(F): 97.3 (06-13-18 @ 05:38)  HR: 75 (06-13-18 @ 05:38) (75 - 81)  BP: 128/57 (06-13-18 @ 05:38) (128/57 - 146/64)  RR: 18 (06-13-18 @ 05:38) (18 - 20)  SpO2: 94% (06-13-18 @ 09:51) (94% - 94%)            ******************************** PHYSICAL EXAM:**************************************************  GENERAL: NAD    PSYCH:   HEENT:     NERVOUS SYSTEM:  Alert & Oriented X3,     PULMONARY: CHRISTOPHER, CTA    CARDIOVASCULAR: S1S2 RRR    GI: Soft, NT, ND; BS present.    EXTREMITIES:  1+ Peripheral edema LE B/L    LYMPH: No lymphadenopathy noted    SKIN: No rashes or lesions    ******************************************************************************************    Consultant(s) Notes Reviewed:  [x ] YES  [ ] NO    Discussed with Consultants/Other Providers [ x] YES     **************************** LABS *******************************************************                          11.0   4.06  )-----------( 169      ( 13 Jun 2018 05:16 )             33.2     06-13    136  |  95<L>  |  23<H>  ----------------------------<  120<H>  4.6   |  22  |  1.3    Ca    9.5      13 Jun 2018 05:16  Mg     1.8     06-13          PTT - ( 12 Jun 2018 12:41 )  PTT:114.4 sec  Lactate Trend        CAPILLARY BLOOD GLUCOSE              **************************Active Medications *******************************************  Avelox (Other)  Plaquenil (Other)  Vantin (Other (Mild))      ALBUTerol/ipratropium for Nebulization 3 milliLiter(s) Nebulizer every 6 hours PRN  ALPRAZolam 2 milliGRAM(s) Oral three times a day  docusate sodium 100 milliGRAM(s) Oral three times a day  fluticasone furoate/vilanterol 100-25 MICROgram(s) Inhaler 1 Puff(s) Inhalation daily  folic acid 1 milliGRAM(s) Oral daily  gabapentin 200 milliGRAM(s) Oral two times a day  guaiFENesin  milliGRAM(s) Oral every 12 hours  hydrOXYzine hydrochloride 25 milliGRAM(s) Oral two times a day  metoclopramide 10 milliGRAM(s) Oral three times a day before meals  metoprolol succinate ER 50 milliGRAM(s) Oral daily  micafungin IVPB 100 milliGRAM(s) IV Intermittent every 24 hours  morphine ER Tablet 200 milliGRAM(s) Oral every 8 hours  nystatin    Suspension 780298 Unit(s) Oral four times a day  ondansetron Injectable 4 milliGRAM(s) IV Push every 6 hours PRN  pantoprazole    Tablet 40 milliGRAM(s) Oral before breakfast  rivaroxaban 20 milliGRAM(s) Oral every 24 hours  senna 2 Tablet(s) Oral at bedtime  traZODone 100 milliGRAM(s) Oral at bedtime      ***************************************************  RADIOLOGY & ADDITIONAL TESTS:    Imaging Personally Reviewed:  [ ] YES  [ ] NO    HEALTH ISSUES - PROBLEM Dx:

## 2018-06-14 LAB
ALBUMIN SERPL ELPH-MCNC: 4.1 G/DL — SIGNIFICANT CHANGE UP (ref 3.5–5.2)
ALP SERPL-CCNC: 67 U/L — SIGNIFICANT CHANGE UP (ref 30–115)
ALT FLD-CCNC: 7 U/L — SIGNIFICANT CHANGE UP (ref 0–41)
AST SERPL-CCNC: 13 U/L — SIGNIFICANT CHANGE UP (ref 0–41)
BILIRUB DIRECT SERPL-MCNC: <0.2 MG/DL — SIGNIFICANT CHANGE UP (ref 0–0.2)
BILIRUB INDIRECT FLD-MCNC: >0 MG/DL — LOW (ref 0.2–1.2)
BILIRUB SERPL-MCNC: 0.2 MG/DL — SIGNIFICANT CHANGE UP (ref 0.2–1.2)
HCT VFR BLD CALC: 37.2 % — SIGNIFICANT CHANGE UP (ref 37–47)
HGB BLD-MCNC: 12.3 G/DL — SIGNIFICANT CHANGE UP (ref 12–16)
MCHC RBC-ENTMCNC: 28.3 PG — SIGNIFICANT CHANGE UP (ref 27–31)
MCHC RBC-ENTMCNC: 33.1 G/DL — SIGNIFICANT CHANGE UP (ref 32–37)
MCV RBC AUTO: 85.7 FL — SIGNIFICANT CHANGE UP (ref 81–99)
NRBC # BLD: 0 /100 WBCS — SIGNIFICANT CHANGE UP (ref 0–0)
PLATELET # BLD AUTO: 225 K/UL — SIGNIFICANT CHANGE UP (ref 130–400)
PROT SERPL-MCNC: 6.7 G/DL — SIGNIFICANT CHANGE UP (ref 6–8)
RBC # BLD: 4.34 M/UL — SIGNIFICANT CHANGE UP (ref 4.2–5.4)
RBC # FLD: 13.3 % — SIGNIFICANT CHANGE UP (ref 11.5–14.5)
WBC # BLD: 10.52 K/UL — SIGNIFICANT CHANGE UP (ref 4.8–10.8)
WBC # FLD AUTO: 10.52 K/UL — SIGNIFICANT CHANGE UP (ref 4.8–10.8)

## 2018-06-14 RX ORDER — HYDROMORPHONE HYDROCHLORIDE 2 MG/ML
8 INJECTION INTRAMUSCULAR; INTRAVENOUS; SUBCUTANEOUS EVERY 4 HOURS
Qty: 0 | Refills: 0 | Status: DISCONTINUED | OUTPATIENT
Start: 2018-06-14 | End: 2018-06-21

## 2018-06-14 RX ADMIN — MORPHINE SULFATE 200 MILLIGRAM(S): 50 CAPSULE, EXTENDED RELEASE ORAL at 13:36

## 2018-06-14 RX ADMIN — Medication 500000 UNIT(S): at 05:27

## 2018-06-14 RX ADMIN — Medication 500000 UNIT(S): at 00:23

## 2018-06-14 RX ADMIN — Medication 10 MILLIGRAM(S): at 17:27

## 2018-06-14 RX ADMIN — Medication 100 MILLIGRAM(S): at 13:36

## 2018-06-14 RX ADMIN — Medication 100 MILLIGRAM(S): at 21:20

## 2018-06-14 RX ADMIN — ONDANSETRON 4 MILLIGRAM(S): 8 TABLET, FILM COATED ORAL at 15:19

## 2018-06-14 RX ADMIN — HYDROMORPHONE HYDROCHLORIDE 8 MILLIGRAM(S): 2 INJECTION INTRAMUSCULAR; INTRAVENOUS; SUBCUTANEOUS at 13:10

## 2018-06-14 RX ADMIN — Medication 2 MILLIGRAM(S): at 13:36

## 2018-06-14 RX ADMIN — MORPHINE SULFATE 200 MILLIGRAM(S): 50 CAPSULE, EXTENDED RELEASE ORAL at 21:18

## 2018-06-14 RX ADMIN — ONDANSETRON 4 MILLIGRAM(S): 8 TABLET, FILM COATED ORAL at 08:33

## 2018-06-14 RX ADMIN — Medication 10 MILLIGRAM(S): at 12:14

## 2018-06-14 RX ADMIN — MORPHINE SULFATE 200 MILLIGRAM(S): 50 CAPSULE, EXTENDED RELEASE ORAL at 06:37

## 2018-06-14 RX ADMIN — SENNA PLUS 2 TABLET(S): 8.6 TABLET ORAL at 21:19

## 2018-06-14 RX ADMIN — MICAFUNGIN SODIUM 105 MILLIGRAM(S): 100 INJECTION, POWDER, LYOPHILIZED, FOR SOLUTION INTRAVENOUS at 17:26

## 2018-06-14 RX ADMIN — PANTOPRAZOLE SODIUM 40 MILLIGRAM(S): 20 TABLET, DELAYED RELEASE ORAL at 06:37

## 2018-06-14 RX ADMIN — Medication 25 MILLIGRAM(S): at 05:26

## 2018-06-14 RX ADMIN — MORPHINE SULFATE 200 MILLIGRAM(S): 50 CAPSULE, EXTENDED RELEASE ORAL at 14:00

## 2018-06-14 RX ADMIN — Medication 1 MILLIGRAM(S): at 12:14

## 2018-06-14 RX ADMIN — GABAPENTIN 200 MILLIGRAM(S): 400 CAPSULE ORAL at 05:26

## 2018-06-14 RX ADMIN — Medication 2 MILLIGRAM(S): at 05:29

## 2018-06-14 RX ADMIN — Medication 2 MILLIGRAM(S): at 21:19

## 2018-06-14 RX ADMIN — Medication 600 MILLIGRAM(S): at 17:32

## 2018-06-14 RX ADMIN — GABAPENTIN 200 MILLIGRAM(S): 400 CAPSULE ORAL at 17:27

## 2018-06-14 RX ADMIN — Medication 100 MILLIGRAM(S): at 05:26

## 2018-06-14 RX ADMIN — MORPHINE SULFATE 200 MILLIGRAM(S): 50 CAPSULE, EXTENDED RELEASE ORAL at 05:30

## 2018-06-14 RX ADMIN — Medication 50 MILLIGRAM(S): at 05:27

## 2018-06-14 RX ADMIN — MORPHINE SULFATE 200 MILLIGRAM(S): 50 CAPSULE, EXTENDED RELEASE ORAL at 23:00

## 2018-06-14 RX ADMIN — RIVAROXABAN 20 MILLIGRAM(S): KIT at 17:27

## 2018-06-14 RX ADMIN — HYDROMORPHONE HYDROCHLORIDE 8 MILLIGRAM(S): 2 INJECTION INTRAMUSCULAR; INTRAVENOUS; SUBCUTANEOUS at 12:14

## 2018-06-14 NOTE — PROGRESS NOTE ADULT - ASSESSMENT
48 y/o F with significant PMH of Lupus, CHF s/p AICD+PPM, DVT on xarelto, COPD, RA, laryngeal cancer s/p excision in november, s/p  2 week history of esophageal thrush 4 weeks ago status post PO diflucan treatment.       # TIAN likely 2/2 to prerenal as per renal recs - resolved (1.4 on 6/13)  - amphotericin stopped  - c/w to monitor BMP for Cr,     # Worsening Laryngeal Candidiasis, s/p EGD 5/15/2018  - c/w IV micafungin while inpatient, Itraconazole 200mg PO BID x 7D outpatient as per ID recs  - d/c'd decadron - as per ENT recs    - pt will need outpt f/u with TNG for barium and exercise as per ENT note (6/13)  - f/u BMP, Cr, CBC daily  - Monitor Mg2+ and replete as needed  - check daily weights.   - c/w swish and swallow Nystatin as per ENT recs  - HIV negative (5/10)  - repeat CXR for increased coughing ---> [facility]D  - f/u blood cultures - negative    # Throat Cancer s/p excision in Nov 2017; as per pt she was put on CellCept for her LUPUS 2 years ago by her Rheumatologist  and stopped taking it in Feb2018. She said she is not on any other chemotherapy.   - Heme/onc consult ordered --> f/u with her doctor outpatient; pt was seen by Heme/Onc () on last admission    #LUE pustule  - f/u US duplex showed superficial thrombosis left cephalic vein in the forearm, No evidence of DVT  - monitor for fever curve or worsening erythema if spikes fever or spreads will empirically cover with abx (ex clinda)    # COPD exacerbation - resolved  - c/w duonebs q4+prn,   - hold off on steroids  - resume home breo    # Chronic systolic--.> diastolic CHF s/p AICD+PPM:   - echo done 6/7 showed LVEF 65%     # Acute DVT  - was placed on Hep drip given TIAN while on liposomal amphotericin   c/w pt on Xarelto     # HTN - stable  - c/w toprol,   - d/c hydralazine    # RA - controlled  - c/w outpt benlysta + methotrexate shots  - c/w folic acid supplementation    # Chronic Pain 2/2 Multiple Back Sx  - c/w MS contin   - c/w dilaudid PRN  - c/w gabapentin    # Anxiety  - c/w xanax PRN  - c/w trazodone      # ckd 2 - controlled  - monitor BUN, Cr    DVT PPX: on Hep drip  GI PPx: protonix  Dispo: Home IF tolerates diet today without N/V. 46 y/o F with significant PMH of Lupus, CHF s/p AICD+PPM, DVT on xarelto, COPD, RA, laryngeal cancer s/p excision in november, s/p  2 week history of esophageal thrush 4 weeks ago status post PO diflucan treatment.       # TIAN likely 2/2 to prerenal as per renal recs - resolved (1.4 on 6/13)  - amphotericin stopped  - c/w to monitor BMP for Cr,     # Worsening Laryngeal Candidiasis, s/p EGD 5/15/2018  - c/w IV micafungin while inpatient, Itraconazole 200mg PO BID x 7D outpatient as per ID recs  - d/c'd decadron - as per ENT recs    - pt will need outpt f/u with TNG for barium and exercise as per ENT note (6/13)  - f/u BMP, Cr, CBC daily  - Monitor Mg2+ and replete as needed  - check daily weights.   - c/w swish and swallow Nystatin as per ENT recs  - HIV negative (5/10)  - repeat CXR for increased coughing ---> NAPD  - f/u blood cultures - negative    # Epigastric pain with N/V : Possibly viral.     Xray KUB : WNL    Check LFT . IF elevated check RUQ SONO.    # Throat Cancer s/p excision in Nov 2017; as per pt she was put on CellCept for her LUPUS 2 years ago by her Rheumatologist  and stopped taking it in Feb2018. She said she is not on any other chemotherapy.   - Heme/onc consult ordered --> f/u with her doctor outpatient; pt was seen by Heme/Onc () on last admission    #LUE pustule  - f/u US duplex showed superficial thrombosis left cephalic vein in the forearm, No evidence of DVT  - monitor for fever curve or worsening erythema if spikes fever or spreads will empirically cover with abx (ex clinda)    # COPD exacerbation - resolved  - c/w duonebs q4+prn,   - hold off on steroids  - resume home breo    # Chronic systolic--.> diastolic CHF s/p AICD+PPM:   - echo done 6/7 showed LVEF 65%     # Acute DVT  - was placed on Hep drip given TIAN while on liposomal amphotericin   c/w pt on Xarelto     # HTN - stable  - c/w toprol,   - d/c hydralazine    # RA - controlled  - c/w outpt benlysta + methotrexate shots  - c/w folic acid supplementation    # Chronic Pain 2/2 Multiple Back Sx  - c/w MS contin   - c/w dilaudid PRN  - c/w gabapentin    # Anxiety  - c/w xanax PRN  - c/w trazodone      # ckd 2 - controlled  - monitor BUN, Cr    DVT PPX: on Hep drip  GI PPx: protonix  Dispo: Home IF tolerates diet today without N/V.

## 2018-06-14 NOTE — PROGRESS NOTE ADULT - ASSESSMENT
48 y/o F with significant PMH of Lupus, CHF s/p AICD+PPM, DVT on xarelto, COPD, RA, laryngeal cancer s/p excision in november, s/p  2 week history of esophageal thrush 4 weeks ago status post PO diflucan treatment.     # N/V and abdominal pain- gastroenteritis viral vs bacteria vs micafungin side effect  - pt afebrile, hold on Abx for now  - KUB neg for free air, consider CT A/P if no improvement  - pt reports normal BMs  - Zofran PRN  - f/u hepatic panel    # Hemoptysis - resolved on 6/8    # TIAN likely 2/2 to prerenal as per renal recs- improving  - amphotericin stopped  - c/w to monitor BMP for Cr,   - urine studies--> FeNa 1.3 (intrinsic renal injury)  - gently hydrated, stopped for c/o edema--> edema improved  - check bladder scan for PVR as per renal recs    # Worsening Laryngeal Candidiasis, s/p EGD 5/15/2018  - c/w IV micafungin while inpatient, Itraconazole 200mg PO BID x 7D outpatient as per ID recs  - start decadron 10mg once followed by 4mg 8hrs later X2 doses - as per ENT recs   - f/u BMP, Cr, CBC daily  - Monitor Mg2+ and replete as needed  - check daily weights.   - c/w swish and swallow Nystatin as per ENT recs  - HIV negative (5/10)  - f/u CXR --> no ACS   - f/u blood cultures - negative    # Throat Cancer s/p excision in Nov 2017; as per pt she was put on CellCept for her LUPUS 2 years ago by her Rheumatologist  and stopped taking it in Feb2018. She said she is not on any other chemotherapy.   - Heme/onc consult ordered --> f/u with her doctor outpatient; pt was seen by Heme/Onc () on last admission    #LUE pustule  - US duplex showed superficial thrombosis left cephalic vein in the forearm, No evidence of DVT  - monitor for fever curve or worsening erythema if spikes fever or spreads will empirically cover with abx (ex clinda)    # COPD exacerbation - resolved  - c/w duonebs q4+prn,   - hold off on steroids  - resume home breo    # CHF s/p AICD+PPM:   - echo done 6/7 showed LVEF 65%     # Acute DVT  - was placed on Hep drip given TIAN while on liposomal amphotericin  - d/c heparin given improving renal function, restart Xarelto    # HTN - stable  - c/w toprol,   - d/c hydralazine    # RA - controlled  - c/w outpt benlysta + methotrexate shots  - c/w folic acid supplementation    # Chronic Pain 2/2 Multiple Back Sx  - c/w MS contin   - c/w dilaudid PRN  - c/w gabapentin    # Anxiety  - c/w xanax PRN  - c/w trazodone    # hyperkalemia - resolved    # ckd 2 - controlled  - monitor BUN, Cr    DVT PPX: on Hep drip  GI PPx: protonix  Dispo: Home after medically stable

## 2018-06-14 NOTE — PROGRESS NOTE ADULT - SUBJECTIVE AND OBJECTIVE BOX
ILEANA MCNEIL  47y  Female      Patient is a 47y old  Female who presents with a chief complaint of worsening laryngeal candidiasis (04 Jun 2018 20:08)      INTERVAL HPI/OVERNIGHT EVENTS: NAUSEA/ VOMITING YESTERDAY.      ******************************* REVIEW OF SYSTEMS:**********************************************    Feels better today,.  All other review of systems negative    *********************** VITALS ******************************************    T(F): 97 (06-14-18 @ 05:00)  HR: 67 (06-14-18 @ 05:00) (67 - 87)  BP: 113/55 (06-14-18 @ 05:00) (113/55 - 159/83)  RR: 18 (06-14-18 @ 05:00) (18 - 18)  SpO2: 97% (06-14-18 @ 09:54) (97% - 97%)    06-14-18 @ 07:01  -  06-14-18 @ 11:11  --------------------------------------------------------  IN: 240 mL / OUT: 300 mL / NET: -60 mL            06-14-18 @ 07:01  -  06-14-18 @ 11:11  --------------------------------------------------------  IN: 240 mL / OUT: 300 mL / NET: -60 mL        ******************************** PHYSICAL EXAM:**************************************************  GENERAL: NAD    PSYCH:   HEENT:     NERVOUS SYSTEM:  Alert & Oriented X3, MS  5/5 B/L  UE and LE ; Sensory intact    PULMONARY: CHRISTOPHER, CTA    CARDIOVASCULAR: S1S2 RRR    GI: Soft, NT, ND; BS present.    EXTREMITIES:  2+ Peripheral Pulses, No clubbing, cyanosis, or edema    LYMPH: No lymphadenopathy noted    SKIN: No rashes or lesions    ******************************************************************************************    Consultant(s) Notes Reviewed:  [x ] YES  [ ] NO    Discussed with Consultants/Other Providers [ x] YES     **************************** LABS *******************************************************                          12.3   10.52 )-----------( 225      ( 14 Jun 2018 06:19 )             37.2     06-13    136  |  95<L>  |  23<H>  ----------------------------<  120<H>  4.6   |  22  |  1.3    Ca    9.5      13 Jun 2018 05:16  Mg     1.8     06-13          PTT - ( 12 Jun 2018 12:41 )  PTT:114.4 sec  Lactate Trend        CAPILLARY BLOOD GLUCOSE              **************************Active Medications *******************************************  Avelox (Other)  Plaquenil (Other)  Vantin (Other (Mild))      ALBUTerol/ipratropium for Nebulization 3 milliLiter(s) Nebulizer every 6 hours PRN  ALPRAZolam 2 milliGRAM(s) Oral three times a day  docusate sodium 100 milliGRAM(s) Oral three times a day  fluticasone furoate/vilanterol 100-25 MICROgram(s) Inhaler 1 Puff(s) Inhalation daily  folic acid 1 milliGRAM(s) Oral daily  gabapentin 200 milliGRAM(s) Oral two times a day  guaiFENesin  milliGRAM(s) Oral every 12 hours  hydrOXYzine hydrochloride 25 milliGRAM(s) Oral two times a day  metoclopramide 10 milliGRAM(s) Oral three times a day before meals  metoprolol succinate ER 50 milliGRAM(s) Oral daily  micafungin IVPB 100 milliGRAM(s) IV Intermittent every 24 hours  morphine ER Tablet 200 milliGRAM(s) Oral every 8 hours  nystatin    Suspension [id number] Unit(s) Oral four times a day  ondansetron Injectable 4 milliGRAM(s) IV Push every 6 hours PRN  pantoprazole    Tablet 40 milliGRAM(s) Oral before breakfast  rivaroxaban 20 milliGRAM(s) Oral every 24 hours  senna 2 Tablet(s) Oral at bedtime  traZODone 100 milliGRAM(s) Oral at bedtime      ***************************************************  RADIOLOGY & ADDITIONAL TESTS:    Imaging Personally Reviewed:  [ ] YES  [ ] NO    HEALTH ISSUES - PROBLEM Dx: ILEANA MCNEIL  47y  Female      Patient is a 47y old  Female who presents with a chief complaint of worsening laryngeal candidiasis (04 Jun 2018 20:08)      INTERVAL HPI/OVERNIGHT EVENTS: NAUSEA/ VOMITING YESTERDAY.      ******************************* REVIEW OF SYSTEMS:**********************************************    c/o upper belly pain.  All other review of systems negative    *********************** VITALS ******************************************    T(F): 97 (06-14-18 @ 05:00)  HR: 67 (06-14-18 @ 05:00) (67 - 87)  BP: 113/55 (06-14-18 @ 05:00) (113/55 - 159/83)  RR: 18 (06-14-18 @ 05:00) (18 - 18)  SpO2: 97% (06-14-18 @ 09:54) (97% - 97%)    06-14-18 @ 07:01  -  06-14-18 @ 11:11  --------------------------------------------------------  IN: 240 mL / OUT: 300 mL / NET: -60 mL            06-14-18 @ 07:01  -  06-14-18 @ 11:11  --------------------------------------------------------  IN: 240 mL / OUT: 300 mL / NET: -60 mL        ******************************** PHYSICAL EXAM:**************************************************  GENERAL: NAD    PSYCH:   HEENT:     NERVOUS SYSTEM:  Alert & Oriented X3, MS  5/5 B/L  UE and LE ; Sensory intact    PULMONARY: CHRISTOPHER, CTA    CARDIOVASCULAR: S1S2 RRR    GI: Soft, NT, ND; BS present.    EXTREMITIES:  2+ Peripheral Pulses, No clubbing, cyanosis, or edema    LYMPH: No lymphadenopathy noted    SKIN: No rashes or lesions    ******************************************************************************************    Consultant(s) Notes Reviewed:  [x ] YES  [ ] NO    Discussed with Consultants/Other Providers [ x] YES     **************************** LABS *******************************************************                          12.3   10.52 )-----------( 225      ( 14 Jun 2018 06:19 )             37.2     06-13    136  |  95<L>  |  23<H>  ----------------------------<  120<H>  4.6   |  22  |  1.3    Ca    9.5      13 Jun 2018 05:16  Mg     1.8     06-13          PTT - ( 12 Jun 2018 12:41 )  PTT:114.4 sec  Lactate Trend        CAPILLARY BLOOD GLUCOSE              **************************Active Medications *******************************************  Avelox (Other)  Plaquenil (Other)  Vantin (Other (Mild))      ALBUTerol/ipratropium for Nebulization 3 milliLiter(s) Nebulizer every 6 hours PRN  ALPRAZolam 2 milliGRAM(s) Oral three times a day  docusate sodium 100 milliGRAM(s) Oral three times a day  fluticasone furoate/vilanterol 100-25 MICROgram(s) Inhaler 1 Puff(s) Inhalation daily  folic acid 1 milliGRAM(s) Oral daily  gabapentin 200 milliGRAM(s) Oral two times a day  guaiFENesin  milliGRAM(s) Oral every 12 hours  hydrOXYzine hydrochloride 25 milliGRAM(s) Oral two times a day  metoclopramide 10 milliGRAM(s) Oral three times a day before meals  metoprolol succinate ER 50 milliGRAM(s) Oral daily  micafungin IVPB 100 milliGRAM(s) IV Intermittent every 24 hours  morphine ER Tablet 200 milliGRAM(s) Oral every 8 hours  nystatin    Suspension 668250 Unit(s) Oral four times a day  ondansetron Injectable 4 milliGRAM(s) IV Push every 6 hours PRN  pantoprazole    Tablet 40 milliGRAM(s) Oral before breakfast  rivaroxaban 20 milliGRAM(s) Oral every 24 hours  senna 2 Tablet(s) Oral at bedtime  traZODone 100 milliGRAM(s) Oral at bedtime      ***************************************************  RADIOLOGY & ADDITIONAL TESTS:    Imaging Personally Reviewed:  [ ] YES  [ ] NO    HEALTH ISSUES - PROBLEM Dx:

## 2018-06-14 NOTE — PROGRESS NOTE ADULT - SUBJECTIVE AND OBJECTIVE BOX
SUBJECTIVE:    Patient is a 47y old  Female who presents with a chief complaint of worsening laryngeal candidiasis (04 Jun 2018 20:08)    Currently admitted to medicine with the primary diagnosis of Candida esophagitis     Today is hospital day 10d. This morning she reports N/V overnight, abdominal pain    PAST MEDICAL & SURGICAL HISTORY  PAST MEDICAL & SURGICAL HISTORY:  DVT (deep venous thrombosis)  COPD (chronic obstructive pulmonary disease)  CHF (congestive heart failure)  HTN (hypertension)  RA (rheumatoid arthritis)  Throat cancer  AICD (automatic cardioverter/defibrillator) present  Lupus  History of laryngeal cancer  S/P cholecystectomy  S/P appendectomy    SOCIAL HISTORY:    ALLERGIES:  Avelox (Other)  Plaquenil (Other)  Vantin (Other (Mild))    MEDICATIONS:  STANDING MEDICATIONS  ALPRAZolam 2 milliGRAM(s) Oral three times a day  docusate sodium 100 milliGRAM(s) Oral three times a day  fluticasone furoate/vilanterol 100-25 MICROgram(s) Inhaler 1 Puff(s) Inhalation daily  folic acid 1 milliGRAM(s) Oral daily  gabapentin 200 milliGRAM(s) Oral two times a day  guaiFENesin  milliGRAM(s) Oral every 12 hours  hydrOXYzine hydrochloride 25 milliGRAM(s) Oral two times a day  metoclopramide 10 milliGRAM(s) Oral three times a day before meals  metoprolol succinate ER 50 milliGRAM(s) Oral daily  micafungin IVPB 100 milliGRAM(s) IV Intermittent every 24 hours  morphine ER Tablet 200 milliGRAM(s) Oral every 8 hours  nystatin    Suspension 287412 Unit(s) Oral four times a day  pantoprazole    Tablet 40 milliGRAM(s) Oral before breakfast  rivaroxaban 20 milliGRAM(s) Oral every 24 hours  senna 2 Tablet(s) Oral at bedtime  traZODone 100 milliGRAM(s) Oral at bedtime    PRN MEDICATIONS  ALBUTerol/ipratropium for Nebulization 3 milliLiter(s) Nebulizer every 6 hours PRN  HYDROmorphone   Tablet 8 milliGRAM(s) Oral every 4 hours PRN  ondansetron Injectable 4 milliGRAM(s) IV Push every 6 hours PRN    VITALS:   T(F): 97.3  HR: 79  BP: 133/59  RR: 18  SpO2: 97%    HOME MEDS: </u  Benlysta: 200 milligram(s) intravenous once a week  Breo Ellipta 100 mcg-25 mcg/inh inhalation powder: 1 puff(s) inhaled once a day  Dilaudid 8 mg oral tablet: 1 tab(s) orally every 4 hours, As Needed  folic acid 1 mg oral tablet: 1 tab(s) orally once a day  gabapentin 300 mg oral tablet: orally 2 times a day  hydrALAZINE 50 mg oral tablet: orally every 8 hours  itraconazole 200 mg oral tablet: 1 tab(s) orally every 12 hours   methotrexate: injectable once a week  MS Contin 200 mg oral tablet, extended release: 1 tab(s) orally every 8 hours  Prevacid 30 mg oral delayed release capsule: 1 cap(s) orally once a day  ProAir HFA 90 mcg/inh inhalation aerosol: 2 puff(s) inhaled 4 times a day, As Needed  Reglan 10 mg oral tablet: 1 tab(s) orally 3 times a day (before meals)  Toprol-XL 50 mg oral tablet, extended release: 1 tab(s) orally once a day  traZODone 100 mg oral tablet: 1 tab(s) orally once a day (at bedtime)  Vistaril 50 mg oral capsule: orally 2 times a day  Vitamin B12 1000 mcg/mL injectable solution: injectable 2 times a week  Xanax 1 mg oral tablet: orally every 4 hours, As Needed  Xarelto 20 mg oral tablet: 1 tab(s) orally once a day (in the evening)      LABS:                        12.3   10.52 )-----------( 225      ( 14 Jun 2018 06:19 )             37.2     06-13    136  |  95<L>  |  23<H>  ----------------------------<  120<H>  4.6   |  22  |  1.3    Ca    9.5      13 Jun 2018 05:16  Mg     1.8     06-13        RADIOLOGY:  reviewed    PHYSICAL EXAM:  GEN: No acute distress  HEENT: WNL  LUNGS: Clear to auscultation bilaterally   HEART: S1/S2 present. RRR.   ABD: Soft, distended, diffuse tenderness to palpation. Bowel sounds present. No rigidity  EXT: no LE edema  NEURO: AAOX3

## 2018-06-15 LAB
ALBUMIN SERPL ELPH-MCNC: 3.7 G/DL — SIGNIFICANT CHANGE UP (ref 3.5–5.2)
ALP SERPL-CCNC: 52 U/L — SIGNIFICANT CHANGE UP (ref 30–115)
ALT FLD-CCNC: 6 U/L — SIGNIFICANT CHANGE UP (ref 0–41)
ANION GAP SERPL CALC-SCNC: 10 MMOL/L — SIGNIFICANT CHANGE UP (ref 7–14)
AST SERPL-CCNC: 10 U/L — SIGNIFICANT CHANGE UP (ref 0–41)
BASOPHILS # BLD AUTO: 0.03 K/UL — SIGNIFICANT CHANGE UP (ref 0–0.2)
BASOPHILS NFR BLD AUTO: 0.4 % — SIGNIFICANT CHANGE UP (ref 0–1)
BILIRUB DIRECT SERPL-MCNC: <0.2 MG/DL — SIGNIFICANT CHANGE UP (ref 0–0.2)
BILIRUB INDIRECT FLD-MCNC: >0.1 MG/DL — LOW (ref 0.2–1.2)
BILIRUB SERPL-MCNC: 0.3 MG/DL — SIGNIFICANT CHANGE UP (ref 0.2–1.2)
BUN SERPL-MCNC: 22 MG/DL — HIGH (ref 10–20)
CALCIUM SERPL-MCNC: 8.5 MG/DL — SIGNIFICANT CHANGE UP (ref 8.5–10.1)
CHLORIDE SERPL-SCNC: 101 MMOL/L — SIGNIFICANT CHANGE UP (ref 98–110)
CO2 SERPL-SCNC: 30 MMOL/L — SIGNIFICANT CHANGE UP (ref 17–32)
CREAT SERPL-MCNC: 1.2 MG/DL — SIGNIFICANT CHANGE UP (ref 0.7–1.5)
EOSINOPHIL # BLD AUTO: 0.12 K/UL — SIGNIFICANT CHANGE UP (ref 0–0.7)
EOSINOPHIL NFR BLD AUTO: 1.5 % — SIGNIFICANT CHANGE UP (ref 0–8)
GLUCOSE SERPL-MCNC: 96 MG/DL — SIGNIFICANT CHANGE UP (ref 70–99)
HCT VFR BLD CALC: 34.2 % — LOW (ref 37–47)
HGB BLD-MCNC: 11.2 G/DL — LOW (ref 12–16)
IMM GRANULOCYTES NFR BLD AUTO: 0.4 % — HIGH (ref 0.1–0.3)
LYMPHOCYTES # BLD AUTO: 3.36 K/UL — SIGNIFICANT CHANGE UP (ref 1.2–3.4)
LYMPHOCYTES # BLD AUTO: 41.9 % — SIGNIFICANT CHANGE UP (ref 20.5–51.1)
MAGNESIUM SERPL-MCNC: 1.6 MG/DL — LOW (ref 1.8–2.4)
MCHC RBC-ENTMCNC: 28.1 PG — SIGNIFICANT CHANGE UP (ref 27–31)
MCHC RBC-ENTMCNC: 32.7 G/DL — SIGNIFICANT CHANGE UP (ref 32–37)
MCV RBC AUTO: 85.9 FL — SIGNIFICANT CHANGE UP (ref 81–99)
MONOCYTES # BLD AUTO: 0.88 K/UL — HIGH (ref 0.1–0.6)
MONOCYTES NFR BLD AUTO: 11 % — HIGH (ref 1.7–9.3)
NEUTROPHILS # BLD AUTO: 3.6 K/UL — SIGNIFICANT CHANGE UP (ref 1.4–6.5)
NEUTROPHILS NFR BLD AUTO: 44.8 % — SIGNIFICANT CHANGE UP (ref 42.2–75.2)
NRBC # BLD: 0 /100 WBCS — SIGNIFICANT CHANGE UP (ref 0–0)
PLATELET # BLD AUTO: 171 K/UL — SIGNIFICANT CHANGE UP (ref 130–400)
POTASSIUM SERPL-MCNC: 4.2 MMOL/L — SIGNIFICANT CHANGE UP (ref 3.5–5)
POTASSIUM SERPL-SCNC: 4.2 MMOL/L — SIGNIFICANT CHANGE UP (ref 3.5–5)
PROT SERPL-MCNC: 5.9 G/DL — LOW (ref 6–8)
RBC # BLD: 3.98 M/UL — LOW (ref 4.2–5.4)
RBC # FLD: 13.2 % — SIGNIFICANT CHANGE UP (ref 11.5–14.5)
SODIUM SERPL-SCNC: 141 MMOL/L — SIGNIFICANT CHANGE UP (ref 135–146)
WBC # BLD: 8.02 K/UL — SIGNIFICANT CHANGE UP (ref 4.8–10.8)
WBC # FLD AUTO: 8.02 K/UL — SIGNIFICANT CHANGE UP (ref 4.8–10.8)

## 2018-06-15 RX ORDER — DIPHENHYDRAMINE HCL 50 MG
25 CAPSULE ORAL ONCE
Qty: 0 | Refills: 0 | Status: COMPLETED | OUTPATIENT
Start: 2018-06-15 | End: 2018-06-15

## 2018-06-15 RX ORDER — SODIUM CHLORIDE 9 MG/ML
1000 INJECTION INTRAMUSCULAR; INTRAVENOUS; SUBCUTANEOUS
Qty: 0 | Refills: 0 | Status: COMPLETED | OUTPATIENT
Start: 2018-06-15 | End: 2018-06-15

## 2018-06-15 RX ORDER — DIATRIZOATE MEGLUMINE 180 MG/ML
20 INJECTION, SOLUTION INTRAVESICAL ONCE
Qty: 0 | Refills: 0 | Status: COMPLETED | OUTPATIENT
Start: 2018-06-15 | End: 2018-06-15

## 2018-06-15 RX ADMIN — Medication 500000 UNIT(S): at 06:31

## 2018-06-15 RX ADMIN — MICAFUNGIN SODIUM 105 MILLIGRAM(S): 100 INJECTION, POWDER, LYOPHILIZED, FOR SOLUTION INTRAVENOUS at 17:28

## 2018-06-15 RX ADMIN — GABAPENTIN 200 MILLIGRAM(S): 400 CAPSULE ORAL at 17:29

## 2018-06-15 RX ADMIN — GABAPENTIN 200 MILLIGRAM(S): 400 CAPSULE ORAL at 05:37

## 2018-06-15 RX ADMIN — Medication 25 MILLIGRAM(S): at 05:39

## 2018-06-15 RX ADMIN — Medication 50 MILLIGRAM(S): at 05:40

## 2018-06-15 RX ADMIN — Medication 25 MILLIGRAM(S): at 17:31

## 2018-06-15 RX ADMIN — MORPHINE SULFATE 200 MILLIGRAM(S): 50 CAPSULE, EXTENDED RELEASE ORAL at 06:31

## 2018-06-15 RX ADMIN — RIVAROXABAN 20 MILLIGRAM(S): KIT at 17:29

## 2018-06-15 RX ADMIN — Medication 500000 UNIT(S): at 05:40

## 2018-06-15 RX ADMIN — Medication 2 MILLIGRAM(S): at 14:25

## 2018-06-15 RX ADMIN — MORPHINE SULFATE 200 MILLIGRAM(S): 50 CAPSULE, EXTENDED RELEASE ORAL at 23:00

## 2018-06-15 RX ADMIN — Medication 2 MILLIGRAM(S): at 22:32

## 2018-06-15 RX ADMIN — Medication 100 MILLIGRAM(S): at 22:33

## 2018-06-15 RX ADMIN — HYDROMORPHONE HYDROCHLORIDE 8 MILLIGRAM(S): 2 INJECTION INTRAMUSCULAR; INTRAVENOUS; SUBCUTANEOUS at 17:27

## 2018-06-15 RX ADMIN — PANTOPRAZOLE SODIUM 40 MILLIGRAM(S): 20 TABLET, DELAYED RELEASE ORAL at 06:31

## 2018-06-15 RX ADMIN — SENNA PLUS 2 TABLET(S): 8.6 TABLET ORAL at 22:34

## 2018-06-15 RX ADMIN — DIATRIZOATE MEGLUMINE 20 MILLILITER(S): 180 INJECTION, SOLUTION INTRAVESICAL at 09:47

## 2018-06-15 RX ADMIN — Medication 100 MILLIGRAM(S): at 05:37

## 2018-06-15 RX ADMIN — Medication 10 MILLIGRAM(S): at 17:30

## 2018-06-15 RX ADMIN — MORPHINE SULFATE 200 MILLIGRAM(S): 50 CAPSULE, EXTENDED RELEASE ORAL at 22:32

## 2018-06-15 RX ADMIN — Medication 100 MILLIGRAM(S): at 14:25

## 2018-06-15 RX ADMIN — Medication 600 MILLIGRAM(S): at 05:37

## 2018-06-15 RX ADMIN — SODIUM CHLORIDE 50 MILLILITER(S): 9 INJECTION INTRAMUSCULAR; INTRAVENOUS; SUBCUTANEOUS at 16:10

## 2018-06-15 RX ADMIN — Medication 2 MILLIGRAM(S): at 05:36

## 2018-06-15 RX ADMIN — MORPHINE SULFATE 200 MILLIGRAM(S): 50 CAPSULE, EXTENDED RELEASE ORAL at 14:25

## 2018-06-15 RX ADMIN — MORPHINE SULFATE 200 MILLIGRAM(S): 50 CAPSULE, EXTENDED RELEASE ORAL at 05:38

## 2018-06-15 RX ADMIN — Medication 100 MILLIGRAM(S): at 22:34

## 2018-06-15 NOTE — PROGRESS NOTE ADULT - SUBJECTIVE AND OBJECTIVE BOX
ILEANA MCNEIL  [facility] [address] ([facility])            Patient was evaluated and examined  by bedside, vomited in am, c/o epigastric abdominal pain, last bm was yesterday, no fever, no hematemesis, no dysuria          REVIEW OF SYSTEMS:  please see pertinent positives mentioned above, all other 12 ROS negative      T(C): , Max: 36.5 (06-14-18 @ 21:40)  HR: 70 (06-15-18 @ 05:04)  BP: 108/53 (06-15-18 @ 05:04)  RR: 18 (06-15-18 @ 05:04)  SpO2: --  CAPILLARY BLOOD GLUCOSE          PHYSICAL EXAM:  General: NAD, AAOX3, patient is laying comfortably in bed  HEENT: AT, NC, Supple, NO JVD, NO CB  Lungs: CTA B/L, no wheezing, no rhonchi  CVS: normal S1, S2, RRR, NO M/G/R  Abdomen: soft, bowel sounds present, tender in epigastric area, w/o radiation, non-distended  Extremities: no edema, no clubbing, no cyanosis, positive peripheral pulses b/l  Neuro: no acute focal neurological deficits  Skin: no rush, no ecchymosis      LAB  CBC  Date: 06-15-18 @ 05:18  Mean cell Wfreuzjubx76.1  Mean cell Hemoglobin Conc32.7  Mean cell Volum 85.9  Platelet count-Automate 171  RBC Count 3.98  Red Cell Distrib Width13.2  WBC Count8.02  % Albumin, Urine--  Hematocrit 34.2  Hemoglobin 11.2  CBC  Date: 06-14-18 @ 06:19  Mean cell Sritpvbriy66.3  Mean cell Hemoglobin Conc33.1  Mean cell Volum 85.7  Platelet count-Automate 225  RBC Count 4.34  Red Cell Distrib Width13.3  WBC Count10.52  % Albumin, Urine--  Hematocrit 37.2  Hemoglobin 12.3  CBC  Date: 06-13-18 @ 05:16  Mean cell Tsxfgetnym89.0  Mean cell Hemoglobin Conc33.1  Mean cell Volum 84.5  Platelet count-Automate 169  RBC Count 3.93  Red Cell Distrib Width12.9  WBC Count4.06  % Albumin, Urine--  Hematocrit 33.2  Hemoglobin 11.0  CBC  Date: 06-12-18 @ 12:41  Mean cell Rrxhvgetou56.0  Mean cell Hemoglobin Conc33.0  Mean cell Volum 84.8  Platelet count-Automate 181  RBC Count 4.22  Red Cell Distrib Width13.2  WBC Count5.01  % Albumin, Urine--  Hematocrit 35.8  Hemoglobin 11.8  CBC  Date: 06-12-18 @ 06:19  Mean cell Ehgminlmvw95.0  Mean cell Hemoglobin Conc32.3  Mean cell Volum 86.5  Platelet count-Automate 186  RBC Count 4.15  Red Cell Distrib Width13.4  WBC Count5.62  % Albumin, Urine--  Hematocrit 35.9  Hemoglobin 11.6  CBC  Date: 06-11-18 @ 06:21  Mean cell Oqxxumwbiv78.0  Mean cell Hemoglobin Conc32.7  Mean cell Volum 85.6  Platelet count-Automate 159  RBC Count 3.89  Red Cell Distrib Width13.2  WBC Count5.11  % Albumin, Urine--  Hematocrit 33.3  Hemoglobin 10.9  CBC  Date: 06-10-18 @ 06:22  Mean cell Hjyfrrbxjq06.0  Mean cell Hemoglobin Conc32.5  Mean cell Volum 86.1  Platelet count-Automate 145  RBC Count 3.75  Red Cell Distrib Width13.3  WBC Count6.38  % Albumin, Urine--  Hematocrit 32.3  Hemoglobin 10.5  CBC  Date: 06-09-18 @ 05:26  Mean cell Jxrqzcexhl63.0  Mean cell Hemoglobin Conc32.6  Mean cell Volum 85.8  Platelet count-Automate 169  RBC Count 3.93  Red Cell Distrib Width13.2  WBC Count7.10  % Albumin, Urine--  Hematocrit 33.7  Hemoglobin 11.0    [facility]  06-15-18 @ 05:18  Blood Gas Arterial-Calcium,Ionized--  Blood Urea Nitrogen, Serum 22 mg/dL<H> [10 - 20]  Carbon Dioxide, Serum30 mmol/L [17 - 32]  Chloride, Hlvpv412 mmol/L [98 - 110]  Creatinie, Serum1.2 mg/dL [0.7 - 1.5]  Glucose, Serum96 mg/dL [70 - 99]  Potassium, Serum4.2 mmol/L [3.5 - 5.0]  Sodium, Serum 141 mmol/L [135 - 146]  [facility]  06-13-18 @ 05:16  Blood Gas Arterial-Calcium,Ionized--  Blood Urea Nitrogen, Serum 23 mg/dL<H> [10 - 20]  Carbon Dioxide, Serum22 mmol/L [17 - 32]  Chloride, Serum95 mmol/L<L> [98 - 110]  Creatinie, Serum1.3 mg/dL [0.7 - 1.5]  Glucose, Etljd363 mg/dL<H> [70 - 99]  Potassium, Serum4.6 mmol/L [3.5 - 5.0]  Sodium, Serum 136 mmol/L [135 - 146]  BMP  06-12-18 @ 06:19  Blood Gas Arterial-Calcium,Ionized--  Blood Urea Nitrogen, Serum 19 mg/dL [10 - 20]  Carbon Dioxide, Serum27 mmol/L [17 - 32]  Chloride, Serum97 mmol/L<L> [98 - 110]  Creatinie, Serum1.4 mg/dL [0.7 - 1.5]  Glucose, Vrrex937 mg/dL<H> [70 - 99]  Potassium, Serum4.5 mmol/L [3.5 - 5.0]  Sodium, Serum 138 mmol/L [135 - 146]  BMP  06-11-18 @ 06:21  Blood Gas Arterial-Calcium,Ionized--  Blood Urea Nitrogen, Serum 20 mg/dL [10 - 20]  Carbon Dioxide, Serum26 mmol/L [17 - 32]  Chloride, Newhk468 mmol/L [98 - 110]  Creatinie, Serum1.5 mg/dL [0.7 - 1.5]  Glucose, Serum94 mg/dL [70 - 99]  Potassium, Serum4.1 mmol/L [3.5 - 5.0]  Sodium, Serum 137 mmol/L [135 - 146]              Microbiology:    Culture - Blood (collected 06-05-18 @ 06:50)  Source: .Blood None  Final Report (06-10-18 @ 16:01):    No growth at 5 days.    Culture - Blood (collected 06-05-18 @ 03:04)  Source: .Blood Blood  Final Report (06-10-18 @ 06:01):    No growth at 5 days.    Culture - Blood (collected 06-04-18 @ 17:59)  Source: .Blood Blood  Final Report (06-10-18 @ 01:01):    No growth at 5 days.    Culture - Blood (collected 06-04-18 @ 17:59)  Source: .Blood Blood  Final Report (06-10-18 @ 01:01):    No growth at 5 days.          Medications:  ALBUTerol/ipratropium for Nebulization 3 milliLiter(s) Nebulizer every 6 hours PRN  ALPRAZolam 2 milliGRAM(s) Oral three times a day  docusate sodium 100 milliGRAM(s) Oral three times a day  fluticasone furoate/vilanterol 100-25 MICROgram(s) Inhaler 1 Puff(s) Inhalation daily  folic acid 1 milliGRAM(s) Oral daily  gabapentin 200 milliGRAM(s) Oral two times a day  guaiFENesin  milliGRAM(s) Oral every 12 hours  HYDROmorphone   Tablet 8 milliGRAM(s) Oral every 4 hours PRN  hydrOXYzine hydrochloride 25 milliGRAM(s) Oral two times a day  metoclopramide 10 milliGRAM(s) Oral three times a day before meals  metoprolol succinate ER 50 milliGRAM(s) Oral daily  micafungin IVPB 100 milliGRAM(s) IV Intermittent every 24 hours  morphine ER Tablet 200 milliGRAM(s) Oral every 8 hours  nystatin    Suspension [id number] Unit(s) Oral four times a day  ondansetron Injectable 4 milliGRAM(s) IV Push every 6 hours PRN  pantoprazole    Tablet 40 milliGRAM(s) Oral before breakfast  rivaroxaban 20 milliGRAM(s) Oral every 24 hours  senna 2 Tablet(s) Oral at bedtime  sodium chloride 0.9%. 1000 milliLiter(s) IV Continuous <Continuous>  traZODone 100 milliGRAM(s) Oral at bedtime        Assessment and Plan:  # N/V and abdominal pain- ? gastritis  - pt remains afebrile,   -obtain CT abdomen and pelvis  - pt reports normal BMs  - Zofran PRN      # Hemoptysis - resolved on 6/8    # TIAN -resolved, renal function stable  - amphotericin was d/c      # Worsening Laryngeal Candidiasis, s/p EGD 5/15/2018  - c/w IV micafungin while inpatient, Itraconazole 200mg PO BID x 7D outpatient as per ID recs  -        # Throat Cancer s/p excision in Nov 2017; as per pt she was put on CellCept for her LUPUS 2 years ago by her Rheumatologist  and stopped taking it in Feb2018. She said she is not on any other chemotherapy.   -outpatient Oncology f/up    # h/o veins thrombosis  - US duplex showed superficial thrombosis left cephalic vein in the forearm, No evidence of DVT      # COPD exacerbation - resolved  - c/w duonebs q4+prn,   - resume home breo    # CHF s/p AICD+PPM:   - echo done 6/7 showed LVEF 65%     # Acute DVT  - was placed on Hep drip given TIAN while on liposomal amphotericin  - d/c heparin given improving renal function, restart Xarelto    # HTN - stable  - c/w toprol,   - d/c hydralazine    # RA - controlled  - c/w outpt benlysta + methotrexate shots  - c/w folic acid supplementation    # Chronic Pain 2/2 Multiple Back Sx  - c/w MS contin   - c/w dilaudid PRN  - c/w gabapentin    # Anxiety  - c/w xanax PRN  - c/w trazodone    # hyperkalemia - resolved    # ckd 2 - controlled  - monitor BUN, Cr

## 2018-06-15 NOTE — PROGRESS NOTE ADULT - ASSESSMENT
48 y/o F with significant PMH of Lupus, CHF s/p AICD+PPM, DVT on xarelto, COPD, RA, laryngeal cancer s/p excision in november, s/p  2 week history of esophageal thrush 4 weeks ago status post PO diflucan treatment.     # N/V and abdominal pain- gastroenteritis viral vs bacteria vs micafungin side effect  - pt afebrile, hold on Abx for now  - KUB neg for free air, CT A/P pending. At CT, pt had wheezing--> gave benadryl and steroids  - pt reports normal BMs  - Zofran PRN  - f/u hepatic panel    # Hemoptysis - resolved on 6/8    # TIAN likely 2/2 to prerenal as per renal recs- improving  - amphotericin stopped  - c/w to monitor BMP for Cr,   - urine studies--> FeNa 1.3 (intrinsic renal injury)  - gently hydrated, stopped for c/o edema--> edema improved  - check bladder scan for PVR as per renal recs    # Worsening Laryngeal Candidiasis, s/p EGD 5/15/2018  - c/w IV micafungin while inpatient, Itraconazole 200mg PO BID x 7D outpatient as per ID recs  - start decadron 10mg once followed by 4mg 8hrs later X2 doses - as per ENT recs   - f/u BMP, Cr, CBC daily  - Monitor Mg2+ and replete as needed  - check daily weights.   - c/w swish and swallow Nystatin as per ENT recs  - HIV negative (5/10)  - f/u CXR --> no ACS   - f/u blood cultures - negative    # Throat Cancer s/p excision in Nov 2017; as per pt she was put on CellCept for her LUPUS 2 years ago by her Rheumatologist  and stopped taking it in Feb2018. She said she is not on any other chemotherapy.   - Heme/onc consult ordered --> f/u with her doctor outpatient; pt was seen by Heme/Onc () on last admission    #LUE pustule  - US duplex showed superficial thrombosis left cephalic vein in the forearm, No evidence of DVT  - monitor for fever curve or worsening erythema if spikes fever or spreads will empirically cover with abx (ex clinda)    # COPD exacerbation - resolved  - c/w duonebs q4+prn,   - hold off on steroids  - resume home breo    # CHF s/p AICD+PPM:   - echo done 6/7 showed LVEF 65%     # Acute DVT  - was placed on Hep drip given TIAN while on liposomal amphotericin  - d/c heparin given improving renal function, restart Xarelto    # HTN - stable  - c/w toprol,   - d/c hydralazine    # RA - controlled  - c/w outpt benlysta + methotrexate shots  - c/w folic acid supplementation    # Chronic Pain 2/2 Multiple Back Sx  - c/w MS contin   - c/w dilaudid PRN  - c/w gabapentin    # Anxiety  - c/w xanax PRN  - c/w trazodone    # hyperkalemia - resolved    # ckd 2 - controlled  - monitor BUN, Cr    DVT PPX: on Hep drip  GI PPx: protonix  Dispo: Home after medically stable

## 2018-06-16 LAB
ANION GAP SERPL CALC-SCNC: 15 MMOL/L — HIGH (ref 7–14)
BASOPHILS # BLD AUTO: 0.04 K/UL — SIGNIFICANT CHANGE UP (ref 0–0.2)
BASOPHILS NFR BLD AUTO: 0.4 % — SIGNIFICANT CHANGE UP (ref 0–1)
BUN SERPL-MCNC: 19 MG/DL — SIGNIFICANT CHANGE UP (ref 10–20)
CALCIUM SERPL-MCNC: 9.3 MG/DL — SIGNIFICANT CHANGE UP (ref 8.5–10.1)
CHLORIDE SERPL-SCNC: 97 MMOL/L — LOW (ref 98–110)
CO2 SERPL-SCNC: 26 MMOL/L — SIGNIFICANT CHANGE UP (ref 17–32)
CREAT SERPL-MCNC: 1.2 MG/DL — SIGNIFICANT CHANGE UP (ref 0.7–1.5)
EOSINOPHIL # BLD AUTO: 0.25 K/UL — SIGNIFICANT CHANGE UP (ref 0–0.7)
EOSINOPHIL NFR BLD AUTO: 2.6 % — SIGNIFICANT CHANGE UP (ref 0–8)
GLUCOSE SERPL-MCNC: 126 MG/DL — HIGH (ref 70–99)
HCT VFR BLD CALC: 38.6 % — SIGNIFICANT CHANGE UP (ref 37–47)
HGB BLD-MCNC: 12.6 G/DL — SIGNIFICANT CHANGE UP (ref 12–16)
IMM GRANULOCYTES NFR BLD AUTO: 0.5 % — HIGH (ref 0.1–0.3)
LIDOCAIN IGE QN: 10 U/L — SIGNIFICANT CHANGE UP (ref 7–60)
LYMPHOCYTES # BLD AUTO: 3.07 K/UL — SIGNIFICANT CHANGE UP (ref 1.2–3.4)
LYMPHOCYTES # BLD AUTO: 31.8 % — SIGNIFICANT CHANGE UP (ref 20.5–51.1)
MAGNESIUM SERPL-MCNC: 1.7 MG/DL — LOW (ref 1.8–2.4)
MCHC RBC-ENTMCNC: 27.8 PG — SIGNIFICANT CHANGE UP (ref 27–31)
MCHC RBC-ENTMCNC: 32.6 G/DL — SIGNIFICANT CHANGE UP (ref 32–37)
MCV RBC AUTO: 85.2 FL — SIGNIFICANT CHANGE UP (ref 81–99)
MONOCYTES # BLD AUTO: 1.13 K/UL — HIGH (ref 0.1–0.6)
MONOCYTES NFR BLD AUTO: 11.7 % — HIGH (ref 1.7–9.3)
NEUTROPHILS # BLD AUTO: 5.1 K/UL — SIGNIFICANT CHANGE UP (ref 1.4–6.5)
NEUTROPHILS NFR BLD AUTO: 53 % — SIGNIFICANT CHANGE UP (ref 42.2–75.2)
NRBC # BLD: 0 /100 WBCS — SIGNIFICANT CHANGE UP (ref 0–0)
PLATELET # BLD AUTO: 219 K/UL — SIGNIFICANT CHANGE UP (ref 130–400)
POTASSIUM SERPL-MCNC: 4.4 MMOL/L — SIGNIFICANT CHANGE UP (ref 3.5–5)
POTASSIUM SERPL-SCNC: 4.4 MMOL/L — SIGNIFICANT CHANGE UP (ref 3.5–5)
RBC # BLD: 4.53 M/UL — SIGNIFICANT CHANGE UP (ref 4.2–5.4)
RBC # FLD: 13.1 % — SIGNIFICANT CHANGE UP (ref 11.5–14.5)
SODIUM SERPL-SCNC: 138 MMOL/L — SIGNIFICANT CHANGE UP (ref 135–146)
WBC # BLD: 9.64 K/UL — SIGNIFICANT CHANGE UP (ref 4.8–10.8)
WBC # FLD AUTO: 9.64 K/UL — SIGNIFICANT CHANGE UP (ref 4.8–10.8)

## 2018-06-16 RX ORDER — IPRATROPIUM/ALBUTEROL SULFATE 18-103MCG
3 AEROSOL WITH ADAPTER (GRAM) INHALATION EVERY 6 HOURS
Qty: 0 | Refills: 0 | Status: DISCONTINUED | OUTPATIENT
Start: 2018-06-16 | End: 2018-06-26

## 2018-06-16 RX ADMIN — Medication 25 MILLIGRAM(S): at 17:42

## 2018-06-16 RX ADMIN — GABAPENTIN 200 MILLIGRAM(S): 400 CAPSULE ORAL at 17:43

## 2018-06-16 RX ADMIN — MORPHINE SULFATE 200 MILLIGRAM(S): 50 CAPSULE, EXTENDED RELEASE ORAL at 15:35

## 2018-06-16 RX ADMIN — HYDROMORPHONE HYDROCHLORIDE 8 MILLIGRAM(S): 2 INJECTION INTRAMUSCULAR; INTRAVENOUS; SUBCUTANEOUS at 14:58

## 2018-06-16 RX ADMIN — Medication 100 MILLIGRAM(S): at 06:40

## 2018-06-16 RX ADMIN — Medication 2 MILLIGRAM(S): at 12:55

## 2018-06-16 RX ADMIN — Medication 500000 UNIT(S): at 02:42

## 2018-06-16 RX ADMIN — MORPHINE SULFATE 200 MILLIGRAM(S): 50 CAPSULE, EXTENDED RELEASE ORAL at 06:40

## 2018-06-16 RX ADMIN — GABAPENTIN 200 MILLIGRAM(S): 400 CAPSULE ORAL at 06:40

## 2018-06-16 RX ADMIN — MORPHINE SULFATE 200 MILLIGRAM(S): 50 CAPSULE, EXTENDED RELEASE ORAL at 07:28

## 2018-06-16 RX ADMIN — Medication 2 MILLIGRAM(S): at 23:31

## 2018-06-16 RX ADMIN — FLUTICASONE FUROATE AND VILANTEROL TRIFENATATE 1 PUFF(S): 100; 25 POWDER RESPIRATORY (INHALATION) at 12:55

## 2018-06-16 RX ADMIN — FLUTICASONE FUROATE AND VILANTEROL TRIFENATATE 1 PUFF(S): 100; 25 POWDER RESPIRATORY (INHALATION) at 08:20

## 2018-06-16 RX ADMIN — Medication 500000 UNIT(S): at 06:43

## 2018-06-16 RX ADMIN — Medication 3 MILLILITER(S): at 13:42

## 2018-06-16 RX ADMIN — Medication 2 MILLIGRAM(S): at 06:40

## 2018-06-16 RX ADMIN — Medication 10 MILLIGRAM(S): at 12:55

## 2018-06-16 RX ADMIN — RIVAROXABAN 20 MILLIGRAM(S): KIT at 17:42

## 2018-06-16 RX ADMIN — Medication 25 MILLIGRAM(S): at 06:40

## 2018-06-16 RX ADMIN — Medication 40 MILLIGRAM(S): at 23:32

## 2018-06-16 RX ADMIN — Medication 10 MILLIGRAM(S): at 17:43

## 2018-06-16 RX ADMIN — ONDANSETRON 4 MILLIGRAM(S): 8 TABLET, FILM COATED ORAL at 08:19

## 2018-06-16 RX ADMIN — HYDROMORPHONE HYDROCHLORIDE 8 MILLIGRAM(S): 2 INJECTION INTRAMUSCULAR; INTRAVENOUS; SUBCUTANEOUS at 17:00

## 2018-06-16 RX ADMIN — Medication 10 MILLIGRAM(S): at 06:40

## 2018-06-16 RX ADMIN — Medication 1 TABLET(S): at 23:32

## 2018-06-16 RX ADMIN — MICAFUNGIN SODIUM 105 MILLIGRAM(S): 100 INJECTION, POWDER, LYOPHILIZED, FOR SOLUTION INTRAVENOUS at 17:43

## 2018-06-16 RX ADMIN — Medication 40 MILLIGRAM(S): at 14:59

## 2018-06-16 RX ADMIN — Medication 1 MILLIGRAM(S): at 12:55

## 2018-06-16 RX ADMIN — Medication 500000 UNIT(S): at 23:31

## 2018-06-16 RX ADMIN — Medication 50 MILLIGRAM(S): at 06:40

## 2018-06-16 RX ADMIN — Medication 600 MILLIGRAM(S): at 06:40

## 2018-06-16 RX ADMIN — MORPHINE SULFATE 200 MILLIGRAM(S): 50 CAPSULE, EXTENDED RELEASE ORAL at 13:34

## 2018-06-16 RX ADMIN — PANTOPRAZOLE SODIUM 40 MILLIGRAM(S): 20 TABLET, DELAYED RELEASE ORAL at 06:40

## 2018-06-16 RX ADMIN — Medication 100 MILLIGRAM(S): at 13:36

## 2018-06-16 RX ADMIN — Medication 100 MILLIGRAM(S): at 23:32

## 2018-06-16 RX ADMIN — Medication 3 MILLILITER(S): at 19:29

## 2018-06-16 RX ADMIN — MORPHINE SULFATE 200 MILLIGRAM(S): 50 CAPSULE, EXTENDED RELEASE ORAL at 23:31

## 2018-06-16 RX ADMIN — Medication 500000 UNIT(S): at 12:55

## 2018-06-16 NOTE — PROVIDER CONTACT NOTE (OTHER) - SITUATION
Pt stated she feels SOB and as if she has PNA-complaining of cough, nasal congestion & SOB at rest & on exertion

## 2018-06-16 NOTE — PROVIDER CONTACT NOTE (OTHER) - RECOMMENDATIONS
Asked Md to Rx chest xray  advised pt to not leave unit to smoke-educated on both long term and immediate dangers  2LNC at bedside for PRN use

## 2018-06-16 NOTE — PROGRESS NOTE ADULT - SUBJECTIVE AND OBJECTIVE BOX
ILEANA MCNEIL  [facility] T1-3A 003 B ([facility] T1-3A)            Patient was evaluated and examined  by bedside, c/o cough, mild wheezing, no abdominal pain, c/o feeling nausea, no vomiting, no fever                REVIEW OF SYSTEMS:  please see pertinent positives mentioned above, all other 12 ROS negative      T(C): , Max: 36.3 (06-15-18 @ 19:45)  HR: 94 (06-16-18 @ 06:53)  BP: 168/83 (06-16-18 @ 06:53)  RR: 18 (06-16-18 @ 05:20)  SpO2: 94% (06-16-18 @ 08:15)  CAPILLARY BLOOD GLUCOSE          PHYSICAL EXAM:  General: NAD, AAOX3, patient is laying comfortably in bed  HEENT: AT, NC, Supple, NO JVD, NO CB  Lungs: mild decreased breath sounds B/L, mild b/l wheezing, no rhonchi  CVS: normal S1, S2, RRR, NO M/G/R  Abdomen: soft, bowel sounds present, non-tender, non-distended  Extremities: no edema, no clubbing, no cyanosis, positive peripheral pulses b/l  Neuro: no acute focal neurological deficits  Skin: no rush, no ecchymosis      LAB  CBC  Date: 06-16-18 @ 06:57  Mean cell Jjqxcpjjkr05.8  Mean cell Hemoglobin Conc32.6  Mean cell Volum 85.2  Platelet count-Automate 219  RBC Count 4.53  Red Cell Distrib Width13.1  WBC Count9.64  % Albumin, Urine--  Hematocrit 38.6  Hemoglobin 12.6  CBC  Date: 06-15-18 @ 05:18  Mean cell Ivctjivbdm48.1  Mean cell Hemoglobin Conc32.7  Mean cell Volum 85.9  Platelet count-Automate 171  RBC Count 3.98  Red Cell Distrib Width13.2  WBC Count8.02  % Albumin, Urine--  Hematocrit 34.2  Hemoglobin 11.2  CBC  Date: 06-14-18 @ 06:19  Mean cell Shogrekwfj14.3  Mean cell Hemoglobin Conc33.1  Mean cell Volum 85.7  Platelet count-Automate 225  RBC Count 4.34  Red Cell Distrib Width13.3  WBC Count10.52  % Albumin, Urine--  Hematocrit 37.2  Hemoglobin 12.3  CBC  Date: 06-13-18 @ 05:16  Mean cell Vyzqfncgaq08.0  Mean cell Hemoglobin Conc33.1  Mean cell Volum 84.5  Platelet count-Automate 169  RBC Count 3.93  Red Cell Distrib Width12.9  WBC Count4.06  % Albumin, Urine--  Hematocrit 33.2  Hemoglobin 11.0  CBC  Date: 06-12-18 @ 12:41  Mean cell Ymqsotnogw38.0  Mean cell Hemoglobin Conc33.0  Mean cell Volum 84.8  Platelet count-Automate 181  RBC Count 4.22  Red Cell Distrib Width13.2  WBC Count5.01  % Albumin, Urine--  Hematocrit 35.8  Hemoglobin 11.8  CBC  Date: 06-12-18 @ 06:19  Mean cell Kdyjyykoti46.0  Mean cell Hemoglobin Conc32.3  Mean cell Volum 86.5  Platelet count-Automate 186  RBC Count 4.15  Red Cell Distrib Width13.4  WBC Count5.62  % Albumin, Urine--  Hematocrit 35.9  Hemoglobin 11.6  CBC  Date: 06-11-18 @ 06:21  Mean cell Hggkgrghpu82.0  Mean cell Hemoglobin Conc32.7  Mean cell Volum 85.6  Platelet count-Automate 159  RBC Count 3.89  Red Cell Distrib Width13.2  WBC Count5.11  % Albumin, Urine--  Hematocrit 33.3  Hemoglobin 10.9  CBC  Date: 06-10-18 @ 06:22  Mean cell Lskkghbzof23.0  Mean cell Hemoglobin Conc32.5  Mean cell Volum 86.1  Platelet count-Automate 145  RBC Count 3.75  Red Cell Distrib Width13.3  WBC Count6.38  % Albumin, Urine--  Hematocrit 32.3  Hemoglobin 10.5    [facility]  06-16-18 @ 06:57  Blood Gas Arterial-Calcium,Ionized--  Blood Urea Nitrogen, Serum 19 mg/dL [10 - 20]  Carbon Dioxide, Serum26 mmol/L [17 - 32]  Chloride, Serum97 mmol/L<L> [98 - 110]  Creatinie, Serum1.2 mg/dL [0.7 - 1.5]  Glucose, Nggyx619 mg/dL<H> [70 - 99]  Potassium, Serum4.4 mmol/L [3.5 - 5.0]  Sodium, Serum 138 mmol/L [135 - 146]  [facility]  06-15-18 @ 05:18  Blood Gas Arterial-Calcium,Ionized--  Blood Urea Nitrogen, Serum 22 mg/dL<H> [10 - 20]  Carbon Dioxide, Serum30 mmol/L [17 - 32]  Chloride, Wepro937 mmol/L [98 - 110]  Creatinie, Serum1.2 mg/dL [0.7 - 1.5]  Glucose, Serum96 mg/dL [70 - 99]  Potassium, Serum4.2 mmol/L [3.5 - 5.0]  Sodium, Serum 141 mmol/L [135 - 146]  [facility]  06-13-18 @ 05:16  Blood Gas Arterial-Calcium,Ionized--  Blood Urea Nitrogen, Serum 23 mg/dL<H> [10 - 20]  Carbon Dioxide, Serum22 mmol/L [17 - 32]  Chloride, Serum95 mmol/L<L> [98 - 110]  Creatinie, Serum1.3 mg/dL [0.7 - 1.5]  Glucose, Mmaow332 mg/dL<H> [70 - 99]  Potassium, Serum4.6 mmol/L [3.5 - 5.0]  Sodium, Serum 136 mmol/L [135 - 146]  [facility]  06-12-18 @ 06:19  Blood Gas Arterial-Calcium,Ionized--  Blood Urea Nitrogen, Serum 19 mg/dL [10 - 20]  Carbon Dioxide, Serum27 mmol/L [17 - 32]  Chloride, Serum97 mmol/L<L> [98 - 110]  Creatinie, Serum1.4 mg/dL [0.7 - 1.5]  Glucose, Sahap022 mg/dL<H> [70 - 99]  Potassium, Serum4.5 mmol/L [3.5 - 5.0]  Sodium, Serum 138 mmol/L [135 - 146]              Microbiology:    Culture - Blood (collected 06-05-18 @ 06:50)  Source: .Blood None  Final Report (06-10-18 @ 16:01):    No growth at 5 days.    Culture - Blood (collected 06-05-18 @ 03:04)  Source: .Blood Blood  Final Report (06-10-18 @ 06:01):    No growth at 5 days.    Culture - Blood (collected 06-04-18 @ 17:59)  Source: .Blood Blood  Final Report (06-10-18 @ 01:01):    No growth at 5 days.    Culture - Blood (collected 06-04-18 @ 17:59)  Source: .Blood Blood  Final Report (06-10-18 @ 01:01):    No growth at 5 days.            Medications:  ALBUTerol/ipratropium for Nebulization 3 milliLiter(s) Nebulizer every 6 hours  ALPRAZolam 2 milliGRAM(s) Oral three times a day  amoxicillin  875 milliGRAM(s)/clavulanate 1 Tablet(s) Oral two times a day  diphenhydrAMINE   Injectable 25 milliGRAM(s) IV Push once  docusate sodium 100 milliGRAM(s) Oral three times a day  fluticasone furoate/vilanterol 100-25 MICROgram(s) Inhaler 1 Puff(s) Inhalation daily  folic acid 1 milliGRAM(s) Oral daily  gabapentin 200 milliGRAM(s) Oral two times a day  guaiFENesin/dextromethorphan  Syrup 10 milliLiter(s) Oral every 6 hours PRN  HYDROmorphone   Tablet 8 milliGRAM(s) Oral every 4 hours PRN  hydrOXYzine hydrochloride 25 milliGRAM(s) Oral two times a day  methylPREDNISolone sodium succinate Injectable 40 milliGRAM(s) IV Push every 8 hours  metoclopramide 10 milliGRAM(s) Oral three times a day before meals  metoprolol succinate ER 50 milliGRAM(s) Oral daily  micafungin IVPB 100 milliGRAM(s) IV Intermittent every 24 hours  morphine ER Tablet 200 milliGRAM(s) Oral every 8 hours  nystatin    Suspension [id number] Unit(s) Oral four times a day  ondansetron Injectable 4 milliGRAM(s) IV Push every 6 hours PRN  pantoprazole    Tablet 40 milliGRAM(s) Oral before breakfast  rivaroxaban 20 milliGRAM(s) Oral every 24 hours  senna 2 Tablet(s) Oral at bedtime  traZODone 100 milliGRAM(s) Oral at bedtime        Assessment and Plan:  # N/V and abdominal pain- abdominal pain has resolved, patient still c/o nausesa  -CT abdomen/pelvis - no acute abnormalities  - pt remains afebrile,   - pt reports normal BMs  - Zofran PRN      # Hemoptysis - resolved on 6/8    # TIAN -resolved, renal function stable  - amphotericin was d/c      # Worsening Laryngeal Candidiasis, s/p EGD 5/15/2018  - c/w IV micafungin while inpatient, Itraconazole 200mg PO BID x 7D outpatient as per ID recs  -        # Throat Cancer s/p excision in Nov 2017; as per pt she was put on CellCept for her LUPUS 2 years ago by her Rheumatologist  and stopped taking it in Feb2018. She said she is not on any other chemotherapy.   -outpatient Oncology f/up    # h/o veins thrombosis  - US duplex showed superficial thrombosis left cephalic vein in the forearm, No evidence of DVT      # COPD exacerbation - with wheezing today  -CXR- no infiltrates  -worsening cough - c/w bronchitis-  patient to complete 7 days course of po augmentin  - c/w duonebs q4+prn,   - resume home breo    # CHF s/p AICD+PPM:   - echo done 6/7 showed LVEF 65%     # Acute DVT  -  restarted on  Xarelto    # HTN - stable  - c/w toprol,   - d/c hydralazine    # RA - controlled  - c/w outpt benlysta + methotrexate shots  - c/w folic acid supplementation    # Chronic Pain 2/2 Multiple Back Sx  - c/w MS contin   - c/w dilaudid PRN  - c/w gabapentin    # Anxiety  - c/w xanax PRN  - c/w trazodone    # hyperkalemia - resolved    # ckd 2 - controlled  - monitor BUN, Cr    d/c plan: if patient tolerating diet well, with resolved bronchospasm- pt. anticipated for d/c home tomorrow

## 2018-06-17 LAB
ANION GAP SERPL CALC-SCNC: 17 MMOL/L — HIGH (ref 7–14)
BASOPHILS # BLD AUTO: 0 K/UL — SIGNIFICANT CHANGE UP (ref 0–0.2)
BASOPHILS NFR BLD AUTO: 0 % — SIGNIFICANT CHANGE UP (ref 0–1)
BUN SERPL-MCNC: 20 MG/DL — SIGNIFICANT CHANGE UP (ref 10–20)
CALCIUM SERPL-MCNC: 9.5 MG/DL — SIGNIFICANT CHANGE UP (ref 8.5–10.1)
CHLORIDE SERPL-SCNC: 97 MMOL/L — LOW (ref 98–110)
CO2 SERPL-SCNC: 23 MMOL/L — SIGNIFICANT CHANGE UP (ref 17–32)
CREAT SERPL-MCNC: 1.1 MG/DL — SIGNIFICANT CHANGE UP (ref 0.7–1.5)
EOSINOPHIL # BLD AUTO: 0 K/UL — SIGNIFICANT CHANGE UP (ref 0–0.7)
EOSINOPHIL NFR BLD AUTO: 0 % — SIGNIFICANT CHANGE UP (ref 0–8)
GLUCOSE SERPL-MCNC: 145 MG/DL — HIGH (ref 70–99)
HCT VFR BLD CALC: 38 % — SIGNIFICANT CHANGE UP (ref 37–47)
HGB BLD-MCNC: 12.5 G/DL — SIGNIFICANT CHANGE UP (ref 12–16)
IMM GRANULOCYTES NFR BLD AUTO: 0.7 % — HIGH (ref 0.1–0.3)
LYMPHOCYTES # BLD AUTO: 1.41 K/UL — SIGNIFICANT CHANGE UP (ref 1.2–3.4)
LYMPHOCYTES # BLD AUTO: 10.5 % — LOW (ref 20.5–51.1)
MAGNESIUM SERPL-MCNC: 1.8 MG/DL — SIGNIFICANT CHANGE UP (ref 1.8–2.4)
MCHC RBC-ENTMCNC: 28 PG — SIGNIFICANT CHANGE UP (ref 27–31)
MCHC RBC-ENTMCNC: 32.9 G/DL — SIGNIFICANT CHANGE UP (ref 32–37)
MCV RBC AUTO: 85 FL — SIGNIFICANT CHANGE UP (ref 81–99)
MONOCYTES # BLD AUTO: 0.82 K/UL — HIGH (ref 0.1–0.6)
MONOCYTES NFR BLD AUTO: 6.1 % — SIGNIFICANT CHANGE UP (ref 1.7–9.3)
NEUTROPHILS # BLD AUTO: 11.08 K/UL — HIGH (ref 1.4–6.5)
NEUTROPHILS NFR BLD AUTO: 82.7 % — HIGH (ref 42.2–75.2)
NRBC # BLD: 0 /100 WBCS — SIGNIFICANT CHANGE UP (ref 0–0)
PLATELET # BLD AUTO: 260 K/UL — SIGNIFICANT CHANGE UP (ref 130–400)
POTASSIUM SERPL-MCNC: 5.1 MMOL/L — HIGH (ref 3.5–5)
POTASSIUM SERPL-SCNC: 5.1 MMOL/L — HIGH (ref 3.5–5)
RBC # BLD: 4.47 M/UL — SIGNIFICANT CHANGE UP (ref 4.2–5.4)
RBC # FLD: 13 % — SIGNIFICANT CHANGE UP (ref 11.5–14.5)
SODIUM SERPL-SCNC: 137 MMOL/L — SIGNIFICANT CHANGE UP (ref 135–146)
WBC # BLD: 13.4 K/UL — HIGH (ref 4.8–10.8)
WBC # FLD AUTO: 13.4 K/UL — HIGH (ref 4.8–10.8)

## 2018-06-17 RX ADMIN — Medication 40 MILLIGRAM(S): at 21:43

## 2018-06-17 RX ADMIN — Medication 500000 UNIT(S): at 12:27

## 2018-06-17 RX ADMIN — Medication 3 MILLILITER(S): at 14:08

## 2018-06-17 RX ADMIN — Medication 40 MILLIGRAM(S): at 06:37

## 2018-06-17 RX ADMIN — HYDROMORPHONE HYDROCHLORIDE 8 MILLIGRAM(S): 2 INJECTION INTRAMUSCULAR; INTRAVENOUS; SUBCUTANEOUS at 11:51

## 2018-06-17 RX ADMIN — Medication 100 MILLIGRAM(S): at 21:44

## 2018-06-17 RX ADMIN — Medication 1 MILLIGRAM(S): at 12:27

## 2018-06-17 RX ADMIN — Medication 10 MILLIGRAM(S): at 16:45

## 2018-06-17 RX ADMIN — GABAPENTIN 200 MILLIGRAM(S): 400 CAPSULE ORAL at 06:37

## 2018-06-17 RX ADMIN — Medication 40 MILLIGRAM(S): at 13:37

## 2018-06-17 RX ADMIN — FLUTICASONE FUROATE AND VILANTEROL TRIFENATATE 1 PUFF(S): 100; 25 POWDER RESPIRATORY (INHALATION) at 12:28

## 2018-06-17 RX ADMIN — Medication 50 MILLIGRAM(S): at 06:37

## 2018-06-17 RX ADMIN — Medication 2 MILLIGRAM(S): at 06:37

## 2018-06-17 RX ADMIN — Medication 10 MILLIGRAM(S): at 06:37

## 2018-06-17 RX ADMIN — MORPHINE SULFATE 200 MILLIGRAM(S): 50 CAPSULE, EXTENDED RELEASE ORAL at 22:16

## 2018-06-17 RX ADMIN — PANTOPRAZOLE SODIUM 40 MILLIGRAM(S): 20 TABLET, DELAYED RELEASE ORAL at 06:37

## 2018-06-17 RX ADMIN — Medication 10 MILLIGRAM(S): at 12:27

## 2018-06-17 RX ADMIN — Medication 3 MILLILITER(S): at 08:06

## 2018-06-17 RX ADMIN — Medication 25 MILLIGRAM(S): at 17:22

## 2018-06-17 RX ADMIN — RIVAROXABAN 20 MILLIGRAM(S): KIT at 16:45

## 2018-06-17 RX ADMIN — SENNA PLUS 2 TABLET(S): 8.6 TABLET ORAL at 21:44

## 2018-06-17 RX ADMIN — Medication 2 MILLIGRAM(S): at 21:43

## 2018-06-17 RX ADMIN — MORPHINE SULFATE 200 MILLIGRAM(S): 50 CAPSULE, EXTENDED RELEASE ORAL at 13:37

## 2018-06-17 RX ADMIN — HYDROMORPHONE HYDROCHLORIDE 8 MILLIGRAM(S): 2 INJECTION INTRAMUSCULAR; INTRAVENOUS; SUBCUTANEOUS at 11:21

## 2018-06-17 RX ADMIN — MORPHINE SULFATE 200 MILLIGRAM(S): 50 CAPSULE, EXTENDED RELEASE ORAL at 06:37

## 2018-06-17 RX ADMIN — Medication 500000 UNIT(S): at 06:38

## 2018-06-17 RX ADMIN — MICAFUNGIN SODIUM 105 MILLIGRAM(S): 100 INJECTION, POWDER, LYOPHILIZED, FOR SOLUTION INTRAVENOUS at 16:49

## 2018-06-17 RX ADMIN — Medication 25 MILLIGRAM(S): at 06:37

## 2018-06-17 RX ADMIN — Medication 1 TABLET(S): at 17:21

## 2018-06-17 RX ADMIN — Medication 500000 UNIT(S): at 17:22

## 2018-06-17 RX ADMIN — Medication 3 MILLILITER(S): at 20:00

## 2018-06-17 RX ADMIN — MORPHINE SULFATE 200 MILLIGRAM(S): 50 CAPSULE, EXTENDED RELEASE ORAL at 00:01

## 2018-06-17 RX ADMIN — MORPHINE SULFATE 200 MILLIGRAM(S): 50 CAPSULE, EXTENDED RELEASE ORAL at 21:43

## 2018-06-17 RX ADMIN — Medication 2 MILLIGRAM(S): at 13:37

## 2018-06-17 RX ADMIN — Medication 1 TABLET(S): at 08:44

## 2018-06-17 RX ADMIN — GABAPENTIN 200 MILLIGRAM(S): 400 CAPSULE ORAL at 17:21

## 2018-06-17 NOTE — PROGRESS NOTE ADULT - SUBJECTIVE AND OBJECTIVE BOX
ILEANA MCNEIL  47y  Female      Patient is a 47y old  Female who presents with a chief complaint of worsening laryngeal candidiasis (04 Jun 2018 20:08)      INTERVAL HPI/OVERNIGHT EVENTS:      ******************************* REVIEW OF SYSTEMS:**********************************************    still coughing, decreased nausea  All other review of systems negative    *********************** VITALS ******************************************    T(F): 97.9 (06-17-18 @ 05:14)  HR: 81 (06-17-18 @ 05:14) (81 - 93)  BP: 110/56 (06-17-18 @ 05:14) (110/56 - 155/70)  RR: 18 (06-17-18 @ 05:14) (17 - 18)  SpO2: 94% (06-17-18 @ 09:03) (94% - 94%)    06-16-18 @ 07:01  -  06-17-18 @ 07:00  --------------------------------------------------------  IN: 850 mL / OUT: 0 mL / NET: 850 mL            06-16-18 @ 07:01  -  06-17-18 @ 07:00  --------------------------------------------------------  IN: 850 mL / OUT: 0 mL / NET: 850 mL        ******************************** PHYSICAL EXAM:**************************************************  GENERAL: NAD    PSYCH:   HEENT:     NERVOUS SYSTEM:  Alert & Oriented X3, MS  5/5 B/L  UE and LE ; Sensory intact    PULMONARY: CHRISTOPHER, faint wheezing b/l    CARDIOVASCULAR: S1S2 RRR    GI: Soft, NT, ND; BS present.    EXTREMITIES:  decreased LE  edema    LYMPH: No lymphadenopathy noted    SKIN: No rashes or lesions    ******************************************************************************************    Consultant(s) Notes Reviewed:  [x ] YES  [ ] NO    Discussed with Consultants/Other Providers [ x] YES     **************************** LABS *******************************************************                          12.5   13.40 )-----------( 260      ( 17 Jun 2018 08:00 )             38.0     06-17    137  |  97<L>  |  20  ----------------------------<  145<H>  5.1<H>   |  23  |  1.1    Ca    9.5      17 Jun 2018 08:00  Mg     1.8     06-17            Lactate Trend        CAPILLARY BLOOD GLUCOSE              **************************Active Medications *******************************************  Avelox (Other)  Plaquenil (Other)  Vantin (Other (Mild))      ALBUTerol/ipratropium for Nebulization 3 milliLiter(s) Nebulizer every 6 hours  ALPRAZolam 2 milliGRAM(s) Oral three times a day  amoxicillin  875 milliGRAM(s)/clavulanate 1 Tablet(s) Oral two times a day  diphenhydrAMINE   Injectable 25 milliGRAM(s) IV Push once  docusate sodium 100 milliGRAM(s) Oral three times a day  fluticasone furoate/vilanterol 100-25 MICROgram(s) Inhaler 1 Puff(s) Inhalation daily  folic acid 1 milliGRAM(s) Oral daily  gabapentin 200 milliGRAM(s) Oral two times a day  guaiFENesin/dextromethorphan  Syrup 10 milliLiter(s) Oral every 6 hours PRN  HYDROmorphone   Tablet 8 milliGRAM(s) Oral every 4 hours PRN  hydrOXYzine hydrochloride 25 milliGRAM(s) Oral two times a day  methylPREDNISolone sodium succinate Injectable 40 milliGRAM(s) IV Push every 8 hours  metoclopramide 10 milliGRAM(s) Oral three times a day before meals  metoprolol succinate ER 50 milliGRAM(s) Oral daily  micafungin IVPB 100 milliGRAM(s) IV Intermittent every 24 hours  morphine ER Tablet 200 milliGRAM(s) Oral every 8 hours  nystatin    Suspension [id number] Unit(s) Oral four times a day  ondansetron Injectable 4 milliGRAM(s) IV Push every 6 hours PRN  pantoprazole    Tablet 40 milliGRAM(s) Oral before breakfast  rivaroxaban 20 milliGRAM(s) Oral every 24 hours  senna 2 Tablet(s) Oral at bedtime  traZODone 100 milliGRAM(s) Oral at bedtime      ***************************************************  RADIOLOGY & ADDITIONAL TESTS:    Imaging Personally Reviewed:  [ ] YES  [ ] NO    HEALTH ISSUES - PROBLEM Dx:

## 2018-06-17 NOTE — PROGRESS NOTE ADULT - ASSESSMENT
46 y/o F with significant PMH of Lupus, CHF s/p AICD+PPM, DVT on xarelto, COPD, RA, laryngeal cancer s/p excision in november, s/p  2 week history of esophageal thrush 4 weeks ago status post PO diflucan treatment.     # N/V and abdominal pain- gastroenteritis viral vs bacteria vs micafungin side effect  - pt afebrile, hold on Abx for now  - KUB neg for free air, CT A/P pending. At CT, pt had wheezing--> gave benadryl and steroids  - pt reports normal BMs  - Zofran PRN  -  hepatic panel: WNL    # Hemoptysis - resolved on 6/8    # TIAN likely 2/2 to prerenal as per renal recs- improving  - amphotericin stopped  - c/w to monitor BMP for Cr,   - urine studies--> FeNa 1.3 (intrinsic renal injury)  - gently hydrated, stopped for c/o edema--> edema improved  - check bladder scan for PVR as per renal recs    # Worsening Laryngeal Candidiasis, s/p EGD 5/15/2018  - c/w IV micafungin while inpatient, Itraconazole 200mg PO BID x 7D outpatient as per ID recs  - start decadron 10mg once followed by 4mg 8hrs later X2 doses - as per ENT recs   - f/u BMP, Cr, CBC daily  - Monitor Mg2+ and replete as needed  - check daily weights.   - c/w swish and swallow Nystatin as per ENT recs  - HIV negative (5/10)  - f/u CXR --> no ACS   - f/u blood cultures - negative    # Throat Cancer s/p excision in Nov 2017; as per pt she was put on CellCept for her LUPUS 2 years ago by her Rheumatologist  and stopped taking it in Feb2018. She said she is not on any other chemotherapy.   - Heme/onc consult ordered --> f/u with her doctor outpatient; pt was seen by Heme/Onc () on last admission    #LUE pustule  - US duplex showed superficial thrombosis left cephalic vein in the forearm, No evidence of DVT  - monitor for fever curve or worsening erythema if spikes fever or spreads will empirically cover with abx (ex clinda)    # COPD exacerbation -   - c/w duonebs q4+prn,   - CURRENTLY on steroids  - resume home breo    # CHF s/p AICD+PPM:   - echo done 6/7 showed LVEF 65%     # Acute DVT  - On Xarelto.    # HTN - stable  - c/w toprol,   - d/c hydralazine    # RA - controlled  - c/w outpt benlysta + methotrexate shots  - c/w folic acid supplementation    # Chronic Pain 2/2 Multiple Back Sx  - c/w MS contin   - c/w dilaudid PRN  - c/w gabapentin    # Anxiety  - c/w xanax PRN  - c/w trazodone    # hyperkalemia - resolved    # ckd 2 - controlled  - monitor BUN, Cr    DVT PPX: on Hep drip  GI PPx: protonix  Dispo: Home after medically stable

## 2018-06-18 LAB
ANION GAP SERPL CALC-SCNC: 15 MMOL/L — HIGH (ref 7–14)
BASOPHILS # BLD AUTO: 0.02 K/UL — SIGNIFICANT CHANGE UP (ref 0–0.2)
BASOPHILS NFR BLD AUTO: 0.1 % — SIGNIFICANT CHANGE UP (ref 0–1)
BUN SERPL-MCNC: 27 MG/DL — HIGH (ref 10–20)
CALCIUM SERPL-MCNC: 9.4 MG/DL — SIGNIFICANT CHANGE UP (ref 8.5–10.1)
CHLORIDE SERPL-SCNC: 101 MMOL/L — SIGNIFICANT CHANGE UP (ref 98–110)
CO2 SERPL-SCNC: 25 MMOL/L — SIGNIFICANT CHANGE UP (ref 17–32)
CREAT SERPL-MCNC: 1.1 MG/DL — SIGNIFICANT CHANGE UP (ref 0.7–1.5)
EOSINOPHIL # BLD AUTO: 0 K/UL — SIGNIFICANT CHANGE UP (ref 0–0.7)
EOSINOPHIL NFR BLD AUTO: 0 % — SIGNIFICANT CHANGE UP (ref 0–8)
GLUCOSE SERPL-MCNC: 115 MG/DL — HIGH (ref 70–99)
HCT VFR BLD CALC: 34.9 % — LOW (ref 37–47)
HGB BLD-MCNC: 11.3 G/DL — LOW (ref 12–16)
IMM GRANULOCYTES NFR BLD AUTO: 0.7 % — HIGH (ref 0.1–0.3)
LYMPHOCYTES # BLD AUTO: 1.37 K/UL — SIGNIFICANT CHANGE UP (ref 1.2–3.4)
LYMPHOCYTES # BLD AUTO: 7.4 % — LOW (ref 20.5–51.1)
MAGNESIUM SERPL-MCNC: 1.7 MG/DL — LOW (ref 1.8–2.4)
MCHC RBC-ENTMCNC: 27.9 PG — SIGNIFICANT CHANGE UP (ref 27–31)
MCHC RBC-ENTMCNC: 32.4 G/DL — SIGNIFICANT CHANGE UP (ref 32–37)
MCV RBC AUTO: 86.2 FL — SIGNIFICANT CHANGE UP (ref 81–99)
MONOCYTES # BLD AUTO: 1.26 K/UL — HIGH (ref 0.1–0.6)
MONOCYTES NFR BLD AUTO: 6.8 % — SIGNIFICANT CHANGE UP (ref 1.7–9.3)
NEUTROPHILS # BLD AUTO: 15.83 K/UL — HIGH (ref 1.4–6.5)
NEUTROPHILS NFR BLD AUTO: 85 % — HIGH (ref 42.2–75.2)
NRBC # BLD: 0 /100 WBCS — SIGNIFICANT CHANGE UP (ref 0–0)
PLATELET # BLD AUTO: 240 K/UL — SIGNIFICANT CHANGE UP (ref 130–400)
POTASSIUM SERPL-MCNC: 4.7 MMOL/L — SIGNIFICANT CHANGE UP (ref 3.5–5)
POTASSIUM SERPL-SCNC: 4.7 MMOL/L — SIGNIFICANT CHANGE UP (ref 3.5–5)
RBC # BLD: 4.05 M/UL — LOW (ref 4.2–5.4)
RBC # FLD: 13.2 % — SIGNIFICANT CHANGE UP (ref 11.5–14.5)
SODIUM SERPL-SCNC: 141 MMOL/L — SIGNIFICANT CHANGE UP (ref 135–146)
WBC # BLD: 18.61 K/UL — HIGH (ref 4.8–10.8)
WBC # FLD AUTO: 18.61 K/UL — HIGH (ref 4.8–10.8)

## 2018-06-18 RX ORDER — AZITHROMYCIN 500 MG/1
500 TABLET, FILM COATED ORAL ONCE
Qty: 0 | Refills: 0 | Status: COMPLETED | OUTPATIENT
Start: 2018-06-18 | End: 2018-06-18

## 2018-06-18 RX ORDER — AZITHROMYCIN 500 MG/1
250 TABLET, FILM COATED ORAL DAILY
Qty: 0 | Refills: 0 | Status: DISCONTINUED | OUTPATIENT
Start: 2018-06-19 | End: 2018-06-21

## 2018-06-18 RX ADMIN — Medication 1 TABLET(S): at 05:53

## 2018-06-18 RX ADMIN — Medication 1 MILLIGRAM(S): at 12:31

## 2018-06-18 RX ADMIN — Medication 2 MILLIGRAM(S): at 13:28

## 2018-06-18 RX ADMIN — Medication 3 MILLILITER(S): at 07:44

## 2018-06-18 RX ADMIN — Medication 100 MILLIGRAM(S): at 19:41

## 2018-06-18 RX ADMIN — Medication 2 MILLIGRAM(S): at 05:58

## 2018-06-18 RX ADMIN — Medication 10 MILLIGRAM(S): at 18:24

## 2018-06-18 RX ADMIN — MORPHINE SULFATE 200 MILLIGRAM(S): 50 CAPSULE, EXTENDED RELEASE ORAL at 13:28

## 2018-06-18 RX ADMIN — MORPHINE SULFATE 200 MILLIGRAM(S): 50 CAPSULE, EXTENDED RELEASE ORAL at 21:36

## 2018-06-18 RX ADMIN — Medication 2 MILLIGRAM(S): at 21:31

## 2018-06-18 RX ADMIN — PANTOPRAZOLE SODIUM 40 MILLIGRAM(S): 20 TABLET, DELAYED RELEASE ORAL at 06:00

## 2018-06-18 RX ADMIN — Medication 500000 UNIT(S): at 01:03

## 2018-06-18 RX ADMIN — RIVAROXABAN 20 MILLIGRAM(S): KIT at 18:22

## 2018-06-18 RX ADMIN — Medication 25 MILLIGRAM(S): at 17:29

## 2018-06-18 RX ADMIN — MORPHINE SULFATE 200 MILLIGRAM(S): 50 CAPSULE, EXTENDED RELEASE ORAL at 18:45

## 2018-06-18 RX ADMIN — Medication 100 MILLIGRAM(S): at 21:32

## 2018-06-18 RX ADMIN — Medication 30 MILLILITER(S): at 19:16

## 2018-06-18 RX ADMIN — Medication 500000 UNIT(S): at 06:00

## 2018-06-18 RX ADMIN — Medication 3 MILLILITER(S): at 13:30

## 2018-06-18 RX ADMIN — MORPHINE SULFATE 200 MILLIGRAM(S): 50 CAPSULE, EXTENDED RELEASE ORAL at 05:58

## 2018-06-18 RX ADMIN — Medication 100 MILLIGRAM(S): at 05:56

## 2018-06-18 RX ADMIN — GABAPENTIN 200 MILLIGRAM(S): 400 CAPSULE ORAL at 05:53

## 2018-06-18 RX ADMIN — MORPHINE SULFATE 200 MILLIGRAM(S): 50 CAPSULE, EXTENDED RELEASE ORAL at 03:59

## 2018-06-18 RX ADMIN — Medication 500000 UNIT(S): at 12:31

## 2018-06-18 RX ADMIN — Medication 500000 UNIT(S): at 17:29

## 2018-06-18 RX ADMIN — Medication 50 MILLIGRAM(S): at 05:55

## 2018-06-18 RX ADMIN — MICAFUNGIN SODIUM 105 MILLIGRAM(S): 100 INJECTION, POWDER, LYOPHILIZED, FOR SOLUTION INTRAVENOUS at 19:42

## 2018-06-18 RX ADMIN — GABAPENTIN 200 MILLIGRAM(S): 400 CAPSULE ORAL at 17:32

## 2018-06-18 RX ADMIN — Medication 40 MILLIGRAM(S): at 05:54

## 2018-06-18 RX ADMIN — MORPHINE SULFATE 200 MILLIGRAM(S): 50 CAPSULE, EXTENDED RELEASE ORAL at 06:44

## 2018-06-18 RX ADMIN — Medication 25 MILLIGRAM(S): at 05:55

## 2018-06-18 RX ADMIN — Medication 10 MILLIGRAM(S): at 12:30

## 2018-06-18 RX ADMIN — Medication 500000 UNIT(S): at 23:22

## 2018-06-18 RX ADMIN — Medication 60 MILLIGRAM(S): at 17:33

## 2018-06-18 RX ADMIN — Medication 10 MILLIGRAM(S): at 07:54

## 2018-06-18 NOTE — PROGRESS NOTE ADULT - ASSESSMENT
48 y/o F with significant PMH of Lupus, CHF s/p AICD+PPM, DVT on xarelto, COPD, RA, laryngeal cancer s/p excision in november, s/p  2 week history of esophageal thrush 4 weeks ago status post PO diflucan treatment.     # N/V and abdominal pain- gastroenteritis viral vs bacteria vs micafungin side effect  - pt afebrile, hold on Abx for now  - KUB neg for free air, CT A/P : WNL  - pt reports normal BMs  - Zofran PRN  -  hepatic panel: WNL    # COPD exacerbation , still wheezing diffusely   - c/w duonebs q4+prn,   - increased methylprednisolone to 60mg q8  - resume home breo  - ADVISED ON SMOKING CESSATION.    # TIAN on CKD likely 2/2 to prerenal as per renal recs- improving  - amphotericin stopped  - c/w to monitor BMP for Cr,   - urine studies--> FeNa 1.3 (intrinsic renal injury)  - gently hydrated, stopped for c/o edema--> edema improved    # Worsening Laryngeal Candidiasis, s/p EGD 5/15/2018  - c/w IV micafungin while inpatient, Itraconazole 200mg PO BID x 7D outpatient as per ID rec  - HIV negative (5/10)  - CXR --> no ACS   - blood cultures - negative    # Throat Cancer s/p excision in Nov 2017; as per pt she was put on CellCept for her LUPUS 2 years ago by her Rheumatologist  and stopped taking it in Feb2018. She said she is not on any other chemotherapy.   - Heme/onc consult ordered --> f/u with her doctor outpatient; pt was seen by Heme/Onc () on last admission    #LUE pustule  - US duplex showed superficial thrombosis left cephalic vein in the forearm, No evidence of DVT  - monitor for fever curve or worsening erythema if spikes fever or spreads will empirically cover with abx (ex clinda)      # CHF s/p AICD+PPM:   - echo done 6/7 showed LVEF 65%     # Acute DVT  - On Xarelto.    # HTN - stable  - c/w toprol,   - d/c hydralazine    # RA - controlled  - c/w outpt benlysta + methotrexate shots  - c/w folic acid supplementation    # Chronic Pain 2/2 Multiple Back Sx  - c/w MS contin   - c/w dilaudid PRN  - c/w gabapentin    # Anxiety  - c/w xanax PRN  - c/w trazodone    # hyperkalemia - resolved      DVT PPX: on Hep drip  GI PPx: protonix  Dispo: Home after medically stable    full code

## 2018-06-18 NOTE — CONSULT NOTE ADULT - ASSESSMENT
Impression:  COPD exacerbation  Active smoker    Recommendations:  Duoneb q 4hrs and PRN  Repeat Cxr  Course of Azithromycin  C/w Solumedrol IV for today  Smoking cessation  DVT Px Impression:  COPD exacerbation  Active smoker    Recommendations:  Duoneb q 4hrs and PRN  Repeat Cxr  Course of Azithromycin  C/w Solumedrol IV for today, tomorrow prednisone taper  le doppler  Smoking cessation  DVT Px

## 2018-06-18 NOTE — PROGRESS NOTE ADULT - ASSESSMENT
48 y/o F with significant PMH of Lupus, CHF s/p AICD+PPM, DVT on xarelto, COPD, RA, laryngeal cancer s/p excision in november, s/p  2 week history of esophageal thrush 4 weeks ago status post PO diflucan treatment.     # N/V and abdominal pain- gastroenteritis viral vs bacteria vs micafungin side effect  - pt afebrile, hold on Abx for now  - KUB neg for free air, CT A/P : WNL  - pt reports normal BMs  - Zofran PRN  -  hepatic panel: WNL    # COPD exacerbation , STILL Wheezing diffusely   - c/w duonebs q4+prn,   - would increase IV steroids  - resume home breo  - ADVISED ON SMOKING CESSATION.    # TIAN on CKD likely 2/2 to prerenal as per renal recs- improving  - amphotericin stopped  - c/w to monitor BMP for Cr,   - urine studies--> FeNa 1.3 (intrinsic renal injury)  - gently hydrated, stopped for c/o edema--> edema improved    # Worsening Laryngeal Candidiasis, s/p EGD 5/15/2018  - c/w IV micafungin while inpatient, Itraconazole 200mg PO BID x 7D outpatient as per ID rec  - HIV negative (5/10)  - f/u CXR --> no ACS   - f/u blood cultures - negative    # Throat Cancer s/p excision in Nov 2017; as per pt she was put on CellCept for her LUPUS 2 years ago by her Rheumatologist  and stopped taking it in Feb2018. She said she is not on any other chemotherapy.   - Heme/onc consult ordered --> f/u with her doctor outpatient; pt was seen by Heme/Onc () on last admission    #LUE pustule  - US duplex showed superficial thrombosis left cephalic vein in the forearm, No evidence of DVT  - monitor for fever curve or worsening erythema if spikes fever or spreads will empirically cover with abx (ex clinda)      # CHF s/p AICD+PPM:   - echo done 6/7 showed LVEF 65%     # Acute DVT  - On Xarelto.    # HTN - stable  - c/w toprol,   - d/c hydralazine    # RA - controlled  - c/w outpt benlysta + methotrexate shots  - c/w folic acid supplementation    # Chronic Pain 2/2 Multiple Back Sx  - c/w MS contin   - c/w dilaudid PRN  - c/w gabapentin    # Anxiety  - c/w xanax PRN  - c/w trazodone    # hyperkalemia - resolved      DVT PPX: on Hep drip  GI PPx: protonix  Dispo: Home after medically stable

## 2018-06-18 NOTE — PROGRESS NOTE ADULT - SUBJECTIVE AND OBJECTIVE BOX
Patient is a 47y old  Female who presents with a chief complaint of worsening laryngeal candidiasis (04 Jun 2018 20:08)      PAST MEDICAL & SURGICAL HISTORY:  DVT (deep venous thrombosis)  COPD (chronic obstructive pulmonary disease)  CHF (congestive heart failure)  HTN (hypertension)  RA (rheumatoid arthritis)  Throat cancer  AICD (automatic cardioverter/defibrillator) present  Lupus  History of laryngeal cancer  S/P cholecystectomy  S/P appendectomy      MEDICATIONS  (STANDING):  ALBUTerol/ipratropium for Nebulization 3 milliLiter(s) Nebulizer every 6 hours  ALPRAZolam 2 milliGRAM(s) Oral three times a day  diphenhydrAMINE   Injectable 25 milliGRAM(s) IV Push once  docusate sodium 100 milliGRAM(s) Oral three times a day  fluticasone furoate/vilanterol 100-25 MICROgram(s) Inhaler 1 Puff(s) Inhalation daily  folic acid 1 milliGRAM(s) Oral daily  gabapentin 200 milliGRAM(s) Oral two times a day  hydrOXYzine hydrochloride 25 milliGRAM(s) Oral two times a day  methylPREDNISolone sodium succinate Injectable 60 milliGRAM(s) IV Push three times a day  metoclopramide 10 milliGRAM(s) Oral three times a day before meals  metoprolol succinate ER 50 milliGRAM(s) Oral daily  micafungin IVPB 100 milliGRAM(s) IV Intermittent every 24 hours  morphine ER Tablet 200 milliGRAM(s) Oral every 8 hours  nystatin    Suspension [id number] Unit(s) Oral four times a day  pantoprazole    Tablet 40 milliGRAM(s) Oral before breakfast  rivaroxaban 20 milliGRAM(s) Oral every 24 hours  senna 2 Tablet(s) Oral at bedtime  traZODone 100 milliGRAM(s) Oral at bedtime    MEDICATIONS  (PRN):  guaiFENesin/dextromethorphan  Syrup 10 milliLiter(s) Oral every 6 hours PRN Cough  HYDROmorphone   Tablet 8 milliGRAM(s) Oral every 4 hours PRN Severe Pain (7 - 10)  ondansetron Injectable 4 milliGRAM(s) IV Push every 6 hours PRN Nausea and/or Vomiting      Overnight events:    Vital Signs Last 24 Hrs  T(C): 36.7 (18 Jun 2018 14:53), Max: 37.1 (17 Jun 2018 20:33)  T(F): 98 (18 Jun 2018 14:53), Max: 98.7 (17 Jun 2018 20:33)  HR: 72 (18 Jun 2018 14:53) (72 - 85)  BP: 158/79 (18 Jun 2018 14:53) (124/60 - 158/79)  BP(mean): --  RR: 18 (18 Jun 2018 14:53) (18 - 19)  SpO2: --  CAPILLARY BLOOD GLUCOSE        I&O's Summary      Physical Exam:    GENERAL: NAD    PULMONARY: diffuse wheezing b/l    CARDIOVASCULAR: S1S2 rrr    GI: soft, NT, +BS    EXTREMITIES:  No clubbing, cyanosis, or edema    NERVOUS SYSTEM:  Alert & Oriented X3            Labs:                        11.3   18.61 )-----------( 240      ( 18 Jun 2018 05:25 )             34.9             06-18    141  |  101  |  27<H>  ----------------------------<  115<H>  4.7   |  25  |  1.1    Ca    9.4      18 Jun 2018 05:25  Mg     1.7     06-18

## 2018-06-18 NOTE — PROGRESS NOTE ADULT - SUBJECTIVE AND OBJECTIVE BOX
ILEANA MCNEIL  47y  Female      Patient is a 47y old  Female who presents with a chief complaint of worsening laryngeal candidiasis (04 Jun 2018 20:08)      INTERVAL HPI/OVERNIGHT EVENTS:      ******************************* REVIEW OF SYSTEMS:**********************************************    c/o increased SOB, cough  All other review of systems negative    *********************** VITALS ******************************************    T(F): 97.3 (06-18-18 @ 05:35)  HR: 85 (06-18-18 @ 05:35) (81 - 85)  BP: 124/60 (06-18-18 @ 05:35) (124/60 - 147/76)  RR: 18 (06-18-18 @ 05:35) (18 - 19)  SpO2: --            ******************************** PHYSICAL EXAM:**************************************************  GENERAL: NAD    PSYCH:   HEENT:     NERVOUS SYSTEM:  Alert & Oriented X3, MS  5/5 B/L  UE and LE ; Sensory intact    PULMONARY: CHRISTOPHER, diffuse wheezing B/L    CARDIOVASCULAR: S1S2 RRR    GI: Soft, NT, ND; BS present.    EXTREMITIES:  2+ Peripheral Pulses, No clubbing, cyanosis, or edema    LYMPH: No lymphadenopathy noted    SKIN: No rashes or lesions    ******************************************************************************************    Consultant(s) Notes Reviewed:  [x ] YES  [ ] NO    Discussed with Consultants/Other Providers [ x] YES     **************************** LABS *******************************************************                          11.3   18.61 )-----------( 240      ( 18 Jun 2018 05:25 )             34.9     06-18    141  |  101  |  27<H>  ----------------------------<  115<H>  4.7   |  25  |  1.1    Ca    9.4      18 Jun 2018 05:25  Mg     1.7     06-18            Lactate Trend        CAPILLARY BLOOD GLUCOSE              **************************Active Medications *******************************************  Avelox (Other)  Plaquenil (Other)  Vantin (Other (Mild))      ALBUTerol/ipratropium for Nebulization 3 milliLiter(s) Nebulizer every 6 hours  ALPRAZolam 2 milliGRAM(s) Oral three times a day  diphenhydrAMINE   Injectable 25 milliGRAM(s) IV Push once  docusate sodium 100 milliGRAM(s) Oral three times a day  fluticasone furoate/vilanterol 100-25 MICROgram(s) Inhaler 1 Puff(s) Inhalation daily  folic acid 1 milliGRAM(s) Oral daily  gabapentin 200 milliGRAM(s) Oral two times a day  guaiFENesin/dextromethorphan  Syrup 10 milliLiter(s) Oral every 6 hours PRN  HYDROmorphone   Tablet 8 milliGRAM(s) Oral every 4 hours PRN  hydrOXYzine hydrochloride 25 milliGRAM(s) Oral two times a day  methylPREDNISolone sodium succinate Injectable 40 milliGRAM(s) IV Push every 8 hours  metoclopramide 10 milliGRAM(s) Oral three times a day before meals  metoprolol succinate ER 50 milliGRAM(s) Oral daily  micafungin IVPB 100 milliGRAM(s) IV Intermittent every 24 hours  morphine ER Tablet 200 milliGRAM(s) Oral every 8 hours  nystatin    Suspension 750703 Unit(s) Oral four times a day  ondansetron Injectable 4 milliGRAM(s) IV Push every 6 hours PRN  pantoprazole    Tablet 40 milliGRAM(s) Oral before breakfast  rivaroxaban 20 milliGRAM(s) Oral every 24 hours  senna 2 Tablet(s) Oral at bedtime  traZODone 100 milliGRAM(s) Oral at bedtime      ***************************************************  RADIOLOGY & ADDITIONAL TESTS:    Imaging Personally Reviewed:  [ ] YES  [ ] NO    HEALTH ISSUES - PROBLEM Dx:

## 2018-06-19 LAB
ANION GAP SERPL CALC-SCNC: 16 MMOL/L — HIGH (ref 7–14)
BUN SERPL-MCNC: 31 MG/DL — HIGH (ref 10–20)
CALCIUM SERPL-MCNC: 9.3 MG/DL — SIGNIFICANT CHANGE UP (ref 8.5–10.1)
CHLORIDE SERPL-SCNC: 98 MMOL/L — SIGNIFICANT CHANGE UP (ref 98–110)
CO2 SERPL-SCNC: 25 MMOL/L — SIGNIFICANT CHANGE UP (ref 17–32)
CREAT SERPL-MCNC: 1.2 MG/DL — SIGNIFICANT CHANGE UP (ref 0.7–1.5)
GLUCOSE SERPL-MCNC: 131 MG/DL — HIGH (ref 70–99)
HCT VFR BLD CALC: 37.8 % — SIGNIFICANT CHANGE UP (ref 37–47)
HGB BLD-MCNC: 12.5 G/DL — SIGNIFICANT CHANGE UP (ref 12–16)
MCHC RBC-ENTMCNC: 28.3 PG — SIGNIFICANT CHANGE UP (ref 27–31)
MCHC RBC-ENTMCNC: 33.1 G/DL — SIGNIFICANT CHANGE UP (ref 32–37)
MCV RBC AUTO: 85.5 FL — SIGNIFICANT CHANGE UP (ref 81–99)
NRBC # BLD: 0 /100 WBCS — SIGNIFICANT CHANGE UP (ref 0–0)
PLATELET # BLD AUTO: 278 K/UL — SIGNIFICANT CHANGE UP (ref 130–400)
POTASSIUM SERPL-MCNC: 4.4 MMOL/L — SIGNIFICANT CHANGE UP (ref 3.5–5)
POTASSIUM SERPL-SCNC: 4.4 MMOL/L — SIGNIFICANT CHANGE UP (ref 3.5–5)
RBC # BLD: 4.42 M/UL — SIGNIFICANT CHANGE UP (ref 4.2–5.4)
RBC # FLD: 13.3 % — SIGNIFICANT CHANGE UP (ref 11.5–14.5)
SODIUM SERPL-SCNC: 139 MMOL/L — SIGNIFICANT CHANGE UP (ref 135–146)
WBC # BLD: 16.53 K/UL — HIGH (ref 4.8–10.8)
WBC # FLD AUTO: 16.53 K/UL — HIGH (ref 4.8–10.8)

## 2018-06-19 RX ADMIN — AZITHROMYCIN 250 MILLIGRAM(S): 500 TABLET, FILM COATED ORAL at 11:38

## 2018-06-19 RX ADMIN — Medication 50 MILLIGRAM(S): at 05:19

## 2018-06-19 RX ADMIN — Medication 60 MILLIGRAM(S): at 13:15

## 2018-06-19 RX ADMIN — Medication 3 MILLILITER(S): at 13:25

## 2018-06-19 RX ADMIN — Medication 2 MILLIGRAM(S): at 05:19

## 2018-06-19 RX ADMIN — Medication 2 MILLIGRAM(S): at 21:14

## 2018-06-19 RX ADMIN — Medication 25 MILLIGRAM(S): at 17:50

## 2018-06-19 RX ADMIN — Medication 30 MILLILITER(S): at 11:37

## 2018-06-19 RX ADMIN — Medication 500000 UNIT(S): at 17:35

## 2018-06-19 RX ADMIN — HYDROMORPHONE HYDROCHLORIDE 8 MILLIGRAM(S): 2 INJECTION INTRAMUSCULAR; INTRAVENOUS; SUBCUTANEOUS at 17:27

## 2018-06-19 RX ADMIN — MORPHINE SULFATE 200 MILLIGRAM(S): 50 CAPSULE, EXTENDED RELEASE ORAL at 05:31

## 2018-06-19 RX ADMIN — HYDROMORPHONE HYDROCHLORIDE 8 MILLIGRAM(S): 2 INJECTION INTRAMUSCULAR; INTRAVENOUS; SUBCUTANEOUS at 05:31

## 2018-06-19 RX ADMIN — PANTOPRAZOLE SODIUM 40 MILLIGRAM(S): 20 TABLET, DELAYED RELEASE ORAL at 06:06

## 2018-06-19 RX ADMIN — GABAPENTIN 200 MILLIGRAM(S): 400 CAPSULE ORAL at 17:34

## 2018-06-19 RX ADMIN — Medication 2 MILLIGRAM(S): at 13:14

## 2018-06-19 RX ADMIN — Medication 60 MILLIGRAM(S): at 05:10

## 2018-06-19 RX ADMIN — Medication 3 MILLILITER(S): at 07:22

## 2018-06-19 RX ADMIN — Medication 500000 UNIT(S): at 11:37

## 2018-06-19 RX ADMIN — MORPHINE SULFATE 100 MILLIGRAM(S): 50 CAPSULE, EXTENDED RELEASE ORAL at 22:18

## 2018-06-19 RX ADMIN — HYDROMORPHONE HYDROCHLORIDE 8 MILLIGRAM(S): 2 INJECTION INTRAMUSCULAR; INTRAVENOUS; SUBCUTANEOUS at 11:37

## 2018-06-19 RX ADMIN — GABAPENTIN 200 MILLIGRAM(S): 400 CAPSULE ORAL at 05:20

## 2018-06-19 RX ADMIN — MORPHINE SULFATE 200 MILLIGRAM(S): 50 CAPSULE, EXTENDED RELEASE ORAL at 13:17

## 2018-06-19 RX ADMIN — Medication 500000 UNIT(S): at 05:20

## 2018-06-19 RX ADMIN — Medication 10 MILLIGRAM(S): at 11:37

## 2018-06-19 RX ADMIN — Medication 25 MILLIGRAM(S): at 05:19

## 2018-06-19 RX ADMIN — Medication 100 MILLIGRAM(S): at 05:19

## 2018-06-19 RX ADMIN — MORPHINE SULFATE 200 MILLIGRAM(S): 50 CAPSULE, EXTENDED RELEASE ORAL at 05:19

## 2018-06-19 RX ADMIN — HYDROMORPHONE HYDROCHLORIDE 8 MILLIGRAM(S): 2 INJECTION INTRAMUSCULAR; INTRAVENOUS; SUBCUTANEOUS at 04:50

## 2018-06-19 RX ADMIN — Medication 1 MILLIGRAM(S): at 11:42

## 2018-06-19 RX ADMIN — RIVAROXABAN 20 MILLIGRAM(S): KIT at 17:35

## 2018-06-19 RX ADMIN — MICAFUNGIN SODIUM 105 MILLIGRAM(S): 100 INJECTION, POWDER, LYOPHILIZED, FOR SOLUTION INTRAVENOUS at 17:33

## 2018-06-19 RX ADMIN — Medication 10 MILLIGRAM(S): at 06:06

## 2018-06-19 NOTE — PROGRESS NOTE ADULT - SUBJECTIVE AND OBJECTIVE BOX
Patient is a 47y old  Female who presents with a chief complaint of worsening laryngeal candidiasis (04 Jun 2018 20:08)      PAST MEDICAL & SURGICAL HISTORY:  DVT (deep venous thrombosis)  COPD (chronic obstructive pulmonary disease)  CHF (congestive heart failure)  HTN (hypertension)  RA (rheumatoid arthritis)  Throat cancer  AICD (automatic cardioverter/defibrillator) present  Lupus  History of laryngeal cancer  S/P cholecystectomy  S/P appendectomy      MEDICATIONS  (STANDING):  ALBUTerol/ipratropium for Nebulization 3 milliLiter(s) Nebulizer every 6 hours  ALPRAZolam 2 milliGRAM(s) Oral three times a day  azithromycin   Tablet 250 milliGRAM(s) Oral daily  bismuth subsalicylate Liquid 30 milliLiter(s) Oral daily  diphenhydrAMINE   Injectable 25 milliGRAM(s) IV Push once  docusate sodium 100 milliGRAM(s) Oral three times a day  fluticasone furoate/vilanterol 100-25 MICROgram(s) Inhaler 1 Puff(s) Inhalation daily  folic acid 1 milliGRAM(s) Oral daily  gabapentin 200 milliGRAM(s) Oral two times a day  hydrOXYzine hydrochloride 25 milliGRAM(s) Oral two times a day  methylPREDNISolone sodium succinate Injectable 60 milliGRAM(s) IV Push three times a day  metoclopramide 10 milliGRAM(s) Oral three times a day before meals  metoprolol succinate ER 50 milliGRAM(s) Oral daily  micafungin IVPB 100 milliGRAM(s) IV Intermittent every 24 hours  morphine ER Tablet 200 milliGRAM(s) Oral every 8 hours  nystatin    Suspension [id number] Unit(s) Oral four times a day  pantoprazole    Tablet 40 milliGRAM(s) Oral before breakfast  rivaroxaban 20 milliGRAM(s) Oral every 24 hours  senna 2 Tablet(s) Oral at bedtime  traZODone 100 milliGRAM(s) Oral at bedtime    MEDICATIONS  (PRN):  guaiFENesin/dextromethorphan  Syrup 10 milliLiter(s) Oral every 6 hours PRN Cough  HYDROmorphone   Tablet 8 milliGRAM(s) Oral every 4 hours PRN Severe Pain (7 - 10)  ondansetron Injectable 4 milliGRAM(s) IV Push every 6 hours PRN Nausea and/or Vomiting      Overnight events:    Vital Signs Last 24 Hrs  T(C): 36.5 (19 Jun 2018 05:00), Max: 37 (18 Jun 2018 21:27)  T(F): 97.7 (19 Jun 2018 05:00), Max: 98.6 (18 Jun 2018 21:27)  HR: 103 (19 Jun 2018 05:00) (72 - 103)  BP: 192/84 (19 Jun 2018 05:00) (158/79 - 192/84)  BP(mean): --  RR: 20 (19 Jun 2018 05:00) (18 - 20)  SpO2: --  CAPILLARY BLOOD GLUCOSE        I&O's Summary      Physical Exam:  GENERAL: NAD  PULMONARY: b/l wheeze throughout lung fields   CARDIOVASCULAR: S1S2 RRR  GI: soft, NT, +BS  EXTREMITIES:  1+ Peripheral  edema LE  LYMPH: No lymphadenopathy noted  NERVOUS SYSTEM:  AO x 3           Labs:                        12.5   16.53 )-----------( 278      ( 19 Jun 2018 07:02 )             37.8             06-19    139  |  98  |  31<H>  ----------------------------<  131<H>  4.4   |  25  |  1.2    Ca    9.3      19 Jun 2018 07:02  Mg     1.7     06-18

## 2018-06-19 NOTE — PROGRESS NOTE ADULT - ASSESSMENT
46 y/o F with significant PMH of Lupus, CHF s/p AICD+PPM, DVT on xarelto, COPD, RA, laryngeal cancer s/p excision in november, s/p  2 week history of esophageal thrush 4 weeks ago status post PO diflucan treatment.     # N/V and abdominal pain- gastroenteritis viral vs bacteria vs micafungin side effect  - pt afebrile, hold on Abx for now  - KUB neg for free air, CT A/P : WNL  - pt reports normal BMs  - Zofran PRN  -  hepatic panel: WNL    # COPD exacerbation , STILL Wheezing diffusely   - c/w duonebs q4+prn,   - increased IV steroids yesterday, pulm advised keep same dose for now   -continue azithromycin  - resume home breo  - ADVISED ON SMOKING CESSATION.  - added abx.    # TIAN on CKD likely 2/2 to prerenal as per renal recs- improving  - amphotericin stopped  - c/w to monitor BMP for Cr,   - urine studies--> FeNa 1.3 (intrinsic renal injury)  - gently hydrated, stopped for c/o edema--> edema improved    # Laryngeal Candidiasis, s/p EGD 5/15/2018  - c/w IV micafungin while inpatient, Itraconazole 200mg PO BID x 7D outpatient as per ID rec  - HIV negative (5/10)  - f/u CXR --> no ACS   - f/u blood cultures - negative    # Throat Cancer s/p excision in Nov 2017; as per pt she was put on CellCept for her LUPUS 2 years ago by her Rheumatologist  and stopped taking it in Feb2018. She said she is not on any other chemotherapy.   - Heme/onc consult ordered --> f/u with her doctor outpatient; pt was seen by Heme/Onc () on last admission.      # CHF s/p AICD+PPM:   - echo done 6/7 showed LVEF 65%     # Acute DVT  - On Xarelto.    # HTN - stable  - c/w toprol,   - d/c hydralazine    # RA - controlled  - c/w outpt benlysta + methotrexate shots  - c/w folic acid supplementation    # Chronic Pain 2/2 Multiple Back Sx  - c/w MS contin   - c/w dilaudid PRN  - c/w gabapentin    # Anxiety  - c/w xanax PRN  - c/w trazodone    # hyperkalemia - resolved      DVT PPX: on Hep drip  GI PPx: protonix  Dispo: Home after medically stable

## 2018-06-19 NOTE — PROGRESS NOTE ADULT - ASSESSMENT
Impression:    COPD exacerbation  Active smoker      Recommendations:  Duoneb q 4hrs and PRN  Course of Azithromycin  C/w Solumedrol IV for today. BREO ONCE DAILY  Smoking cessation  DVT Px  NOT READY FOR DC

## 2018-06-19 NOTE — CHART NOTE - NSCHARTNOTEFT_GEN_A_CORE
Registered Dietitian Follow-Up     Patient Profile Reviewed                           Yes [x]   No []     Nutrition History Previously Obtained        Yes [x]  No []       Pertinent Subjective Information: Pt was admitted for worsening laryngeal candidiasis s/p 2 week hx of esophageal thrush. Pt currently has gastroenteritis which is effecting PO intake. PMH: DVT, COPD, CHF, HTN, RA, AICD, lupus, laryngeal CA.     Diet order: low sodium     Anthropometrics:  - Ht.160.02cm  - Wt. 77.2kg (6/19)  - BMI 30.1     Pertinent Lab Data: (6/19) BUN 31, glucose 131     Pertinent Meds: docusate sodium, senna, folic acid, reglan, pantoprazole     Physical Findings:  - Appearance: overweight/obese   - GI function: Last BM yesterday (very soft as per Pt)  - Skin: Nando Score 19. Skin intact. Edema 1+/Edema 2+ located on left and right leg and ankle      Nutrition Requirements  Weight Used: admit/ideal      Estimated Energy Needs    Continue [x]  Adjust []  Adjusted Energy Recommendations:   kcal/day  1545-1686kcals/day (MSJ AF 1.1-1.2)     Estimated Protein Needs    Continue [x]  Adjust []  Adjusted Protein Recommendations:   gm/day  52-63g/day (1-1.2g/IBW)      Estimated Fluid Needs        Continue [x]  Adjust []  Adjusted Fluid Recommendations:   mL/day  1:1ml/kcals or per LIP      Nutrient Intake: Pt reports a poor appetite. Pt reports that she likes the food here however she feels nauseous and she is vomiting which is why she isn't eating. Pt reports that the last time she vomited was last night. Pt denies diarrhea however she states that her stool was very soft last night (6/18) which is not normal for her. Pt reports that she is tolerating a regular consistency well. She is aware she needs to cut her food up into small pieces so it does not irritate her throat. Pt is tolerating a regular consistency. Pt does not wish to have a supplement at this time because she feels it will make her nauseous. Before the Pt felt sick, she was eating well.          Nutrition Diagnostic #1  Problem: Inadequate oral intake  Etiology: nausea and vomiting   Statement: consuming <50% of her meals     Nutrition Intervention:   Meals and snacks: continue low sodium diet as ordered      Goal/Expected Outcome: Consume >75% of meals provided for 3 days     Indicator/Monitoring: Will monitor intake, nutrition focused physical findings and labs. At risk

## 2018-06-19 NOTE — PROGRESS NOTE ADULT - SUBJECTIVE AND OBJECTIVE BOX
OVERNIGHT EVENTS: STILL COUGHING, UNABLE TO PRODUCE PHLEGM. WHEEZING    Vital Signs Last 24 Hrs  T(C): 36.5 (19 Jun 2018 05:00), Max: 37 (18 Jun 2018 21:27)  T(F): 97.7 (19 Jun 2018 05:00), Max: 98.6 (18 Jun 2018 21:27)  HR: 103 (19 Jun 2018 05:00) (72 - 103)  BP: 192/84 (19 Jun 2018 05:00) (158/79 - 192/84)  BP(mean): --  RR: 20 (19 Jun 2018 05:00) (18 - 20)  SpO2: 91% RA    PHYSICAL EXAMINATION:    GENERAL: The patient is awake and alert in no apparent distress.     HEENT: Head is normocephalic and atraumatic. Extraocular muscles are intact. Mucous membranes are moist.    NECK: Supple.    LUNGS: B/L WHEEZING    HEART: Regular rate and rhythm without murmur.    ABDOMEN: Soft, nontender, and nondistended.      EXTREMITIES: Without any cyanosis, clubbing, rash, lesions or edema.    NEUROLOGIC: Grossly intact.    SKIN: No ulceration or induration present.      LABS:                        12.5   16.53 )-----------( 278      ( 19 Jun 2018 07:02 )             37.8     06-19    139  |  98  |  31<H>  ----------------------------<  131<H>  4.4   |  25  |  1.2    Ca    9.3      19 Jun 2018 07:02  Mg     1.7     06-18                              MICROBIOLOGY:      MEDICATIONS  (STANDING):  ALBUTerol/ipratropium for Nebulization 3 milliLiter(s) Nebulizer every 6 hours  ALPRAZolam 2 milliGRAM(s) Oral three times a day  azithromycin   Tablet 250 milliGRAM(s) Oral daily  bismuth subsalicylate Liquid 30 milliLiter(s) Oral daily  diphenhydrAMINE   Injectable 25 milliGRAM(s) IV Push once  docusate sodium 100 milliGRAM(s) Oral three times a day  fluticasone furoate/vilanterol 100-25 MICROgram(s) Inhaler 1 Puff(s) Inhalation daily  folic acid 1 milliGRAM(s) Oral daily  gabapentin 200 milliGRAM(s) Oral two times a day  hydrOXYzine hydrochloride 25 milliGRAM(s) Oral two times a day  methylPREDNISolone sodium succinate Injectable 60 milliGRAM(s) IV Push three times a day  metoclopramide 10 milliGRAM(s) Oral three times a day before meals  metoprolol succinate ER 50 milliGRAM(s) Oral daily  micafungin IVPB 100 milliGRAM(s) IV Intermittent every 24 hours  morphine ER Tablet 200 milliGRAM(s) Oral every 8 hours  nystatin    Suspension [id number] Unit(s) Oral four times a day  pantoprazole    Tablet 40 milliGRAM(s) Oral before breakfast  rivaroxaban 20 milliGRAM(s) Oral every 24 hours  senna 2 Tablet(s) Oral at bedtime  traZODone 100 milliGRAM(s) Oral at bedtime    MEDICATIONS  (PRN):  guaiFENesin/dextromethorphan  Syrup 10 milliLiter(s) Oral every 6 hours PRN Cough  HYDROmorphone   Tablet 8 milliGRAM(s) Oral every 4 hours PRN Severe Pain (7 - 10)  ondansetron Injectable 4 milliGRAM(s) IV Push every 6 hours PRN Nausea and/or Vomiting      RADIOLOGY & ADDITIONAL STUDIES:

## 2018-06-19 NOTE — PROGRESS NOTE ADULT - ASSESSMENT
46 y/o F with significant PMH of Lupus, CHF s/p AICD+PPM, DVT on xarelto, COPD, RA, laryngeal cancer s/p excision in november, s/p  2 week history of esophageal thrush 4 weeks ago status post PO diflucan treatment.     # N/V and abdominal pain- gastroenteritis viral vs bacteria vs micafungin side effect  - pt afebrile, hold on Abx for now  - KUB neg for free air, CT A/P : WNL  - pt reports normal BMs  - Zofran PRN  -  hepatic panel: WNL    # COPD exacerbation , STILL Wheezing diffusely   - c/w duonebs q4+prn,   - increased IV steroids  - resume home breo  - ADVISED ON SMOKING CESSATION.  - added abx.    # TIAN on CKD likely 2/2 to prerenal as per renal recs- improving  - amphotericin stopped  - c/w to monitor BMP for Cr,   - urine studies--> FeNa 1.3 (intrinsic renal injury)  - gently hydrated, stopped for c/o edema--> edema improved    # Laryngeal Candidiasis, s/p EGD 5/15/2018  - c/w IV micafungin while inpatient, Itraconazole 200mg PO BID x 7D outpatient as per ID rec  - HIV negative (5/10)  - f/u CXR --> no ACS   - f/u blood cultures - negative    # Throat Cancer s/p excision in Nov 2017; as per pt she was put on CellCept for her LUPUS 2 years ago by her Rheumatologist  and stopped taking it in Feb2018. She said she is not on any other chemotherapy.   - Heme/onc consult ordered --> f/u with her doctor outpatient; pt was seen by Heme/Onc () on last admission.      # CHF s/p AICD+PPM:   - echo done 6/7 showed LVEF 65%     # Acute DVT  - On Xarelto.    # HTN - stable  - c/w toprol,   - d/c hydralazine    # RA - controlled  - c/w outpt benlysta + methotrexate shots  - c/w folic acid supplementation    # Chronic Pain 2/2 Multiple Back Sx  - c/w MS contin   - c/w dilaudid PRN  - c/w gabapentin    # Anxiety  - c/w xanax PRN  - c/w trazodone    # hyperkalemia - resolved      DVT PPX: on Hep drip  GI PPx: protonix  Dispo: Home after medically stable

## 2018-06-19 NOTE — PROGRESS NOTE ADULT - SUBJECTIVE AND OBJECTIVE BOX
ILEANA MCNEIL  47y  Female      Patient is a 47y old  Female who presents with a chief complaint of worsening laryngeal candidiasis (04 Jun 2018 20:08)      INTERVAL HPI/OVERNIGHT EVENTS:      ******************************* REVIEW OF SYSTEMS:**********************************************    Still wheezing.  All other review of systems negative    *********************** VITALS ******************************************    T(F): 97.7 (06-19-18 @ 05:00)  HR: 103 (06-19-18 @ 05:00) (72 - 103)  BP: 192/84 (06-19-18 @ 05:00) (158/79 - 192/84)  RR: 20 (06-19-18 @ 05:00) (18 - 20)  SpO2: --            ******************************** PHYSICAL EXAM:**************************************************  GENERAL: NAD    PSYCH:   HEENT:     NERVOUS SYSTEM:  Alert & Oriented X3, MS  5/5 B/L  UE and LE ; Sensory intact    PULMONARY: CHRISTOPHER, B/L DIFFUSE WHEEZING remains unchanged since yesterday.    CARDIOVASCULAR: S1S2 RRR    GI: Soft, NT, ND; BS present.    EXTREMITIES:  1+ Peripheral  edema LE    LYMPH: No lymphadenopathy noted    SKIN: No rashes or lesions    ******************************************************************************************    Consultant(s) Notes Reviewed:  [x ] YES  [ ] NO    Discussed with Consultants/Other Providers [ x] YES     **************************** LABS *******************************************************                          12.5   16.53 )-----------( 278      ( 19 Jun 2018 07:02 )             37.8     06-19    139  |  98  |  31<H>  ----------------------------<  131<H>  4.4   |  25  |  1.2    Ca    9.3      19 Jun 2018 07:02  Mg     1.7     06-18            Lactate Trend        CAPILLARY BLOOD GLUCOSE              **************************Active Medications *******************************************  Avelox (Other)  Plaquenil (Other)  Vantin (Other (Mild))      ALBUTerol/ipratropium for Nebulization 3 milliLiter(s) Nebulizer every 6 hours  ALPRAZolam 2 milliGRAM(s) Oral three times a day  azithromycin   Tablet 250 milliGRAM(s) Oral daily  bismuth subsalicylate Liquid 30 milliLiter(s) Oral daily  diphenhydrAMINE   Injectable 25 milliGRAM(s) IV Push once  docusate sodium 100 milliGRAM(s) Oral three times a day  fluticasone furoate/vilanterol 100-25 MICROgram(s) Inhaler 1 Puff(s) Inhalation daily  folic acid 1 milliGRAM(s) Oral daily  gabapentin 200 milliGRAM(s) Oral two times a day  guaiFENesin/dextromethorphan  Syrup 10 milliLiter(s) Oral every 6 hours PRN  HYDROmorphone   Tablet 8 milliGRAM(s) Oral every 4 hours PRN  hydrOXYzine hydrochloride 25 milliGRAM(s) Oral two times a day  methylPREDNISolone sodium succinate Injectable 60 milliGRAM(s) IV Push three times a day  metoclopramide 10 milliGRAM(s) Oral three times a day before meals  metoprolol succinate ER 50 milliGRAM(s) Oral daily  micafungin IVPB 100 milliGRAM(s) IV Intermittent every 24 hours  morphine ER Tablet 200 milliGRAM(s) Oral every 8 hours  nystatin    Suspension [id number] Unit(s) Oral four times a day  ondansetron Injectable 4 milliGRAM(s) IV Push every 6 hours PRN  pantoprazole    Tablet 40 milliGRAM(s) Oral before breakfast  rivaroxaban 20 milliGRAM(s) Oral every 24 hours  senna 2 Tablet(s) Oral at bedtime  traZODone 100 milliGRAM(s) Oral at bedtime      ***************************************************  RADIOLOGY & ADDITIONAL TESTS:    Imaging Personally Reviewed:  [ ] YES  [ ] NO    HEALTH ISSUES - PROBLEM Dx:

## 2018-06-20 LAB
ANION GAP SERPL CALC-SCNC: 16 MMOL/L — HIGH (ref 7–14)
BUN SERPL-MCNC: 29 MG/DL — HIGH (ref 10–20)
CALCIUM SERPL-MCNC: 9.6 MG/DL — SIGNIFICANT CHANGE UP (ref 8.5–10.1)
CHLORIDE SERPL-SCNC: 97 MMOL/L — LOW (ref 98–110)
CO2 SERPL-SCNC: 26 MMOL/L — SIGNIFICANT CHANGE UP (ref 17–32)
CREAT SERPL-MCNC: 1.1 MG/DL — SIGNIFICANT CHANGE UP (ref 0.7–1.5)
GLUCOSE SERPL-MCNC: 120 MG/DL — HIGH (ref 70–99)
GRAM STN FLD: SIGNIFICANT CHANGE UP
HCT VFR BLD CALC: 40.2 % — SIGNIFICANT CHANGE UP (ref 37–47)
HGB BLD-MCNC: 12.9 G/DL — SIGNIFICANT CHANGE UP (ref 12–16)
MCHC RBC-ENTMCNC: 27.3 PG — SIGNIFICANT CHANGE UP (ref 27–31)
MCHC RBC-ENTMCNC: 32.1 G/DL — SIGNIFICANT CHANGE UP (ref 32–37)
MCV RBC AUTO: 85.2 FL — SIGNIFICANT CHANGE UP (ref 81–99)
NRBC # BLD: 0 /100 WBCS — SIGNIFICANT CHANGE UP (ref 0–0)
PLATELET # BLD AUTO: 308 K/UL — SIGNIFICANT CHANGE UP (ref 130–400)
POTASSIUM SERPL-MCNC: 4 MMOL/L — SIGNIFICANT CHANGE UP (ref 3.5–5)
POTASSIUM SERPL-SCNC: 4 MMOL/L — SIGNIFICANT CHANGE UP (ref 3.5–5)
RBC # BLD: 4.72 M/UL — SIGNIFICANT CHANGE UP (ref 4.2–5.4)
RBC # FLD: 13.4 % — SIGNIFICANT CHANGE UP (ref 11.5–14.5)
SODIUM SERPL-SCNC: 139 MMOL/L — SIGNIFICANT CHANGE UP (ref 135–146)
SPECIMEN SOURCE: SIGNIFICANT CHANGE UP
WBC # BLD: 19.67 K/UL — HIGH (ref 4.8–10.8)
WBC # FLD AUTO: 19.67 K/UL — HIGH (ref 4.8–10.8)

## 2018-06-20 PROCEDURE — 93971 EXTREMITY STUDY: CPT | Mod: 26

## 2018-06-20 RX ORDER — ALPRAZOLAM 0.25 MG
2 TABLET ORAL THREE TIMES A DAY
Qty: 0 | Refills: 0 | Status: DISCONTINUED | OUTPATIENT
Start: 2018-06-20 | End: 2018-06-26

## 2018-06-20 RX ORDER — ALPRAZOLAM 0.25 MG
0.5 TABLET ORAL ONCE
Qty: 0 | Refills: 0 | Status: DISCONTINUED | OUTPATIENT
Start: 2018-06-20 | End: 2018-06-20

## 2018-06-20 RX ORDER — HYDRALAZINE HCL 50 MG
25 TABLET ORAL EVERY 12 HOURS
Qty: 0 | Refills: 0 | Status: DISCONTINUED | OUTPATIENT
Start: 2018-06-20 | End: 2018-06-21

## 2018-06-20 RX ORDER — MORPHINE SULFATE 50 MG/1
200 CAPSULE, EXTENDED RELEASE ORAL EVERY 8 HOURS
Qty: 0 | Refills: 0 | Status: DISCONTINUED | OUTPATIENT
Start: 2018-06-20 | End: 2018-06-26

## 2018-06-20 RX ORDER — HYDRALAZINE HCL 50 MG
50 TABLET ORAL ONCE
Qty: 0 | Refills: 0 | Status: COMPLETED | OUTPATIENT
Start: 2018-06-20 | End: 2018-06-20

## 2018-06-20 RX ORDER — METOPROLOL TARTRATE 50 MG
25 TABLET ORAL DAILY
Qty: 0 | Refills: 0 | Status: DISCONTINUED | OUTPATIENT
Start: 2018-06-20 | End: 2018-06-26

## 2018-06-20 RX ADMIN — RIVAROXABAN 20 MILLIGRAM(S): KIT at 17:36

## 2018-06-20 RX ADMIN — Medication 500000 UNIT(S): at 05:24

## 2018-06-20 RX ADMIN — Medication 3 MILLILITER(S): at 13:37

## 2018-06-20 RX ADMIN — Medication 25 MILLIGRAM(S): at 17:36

## 2018-06-20 RX ADMIN — GABAPENTIN 200 MILLIGRAM(S): 400 CAPSULE ORAL at 17:36

## 2018-06-20 RX ADMIN — Medication 25 MILLIGRAM(S): at 05:24

## 2018-06-20 RX ADMIN — Medication 3 MILLILITER(S): at 07:18

## 2018-06-20 RX ADMIN — Medication 500000 UNIT(S): at 11:44

## 2018-06-20 RX ADMIN — Medication 1 MILLIGRAM(S): at 11:44

## 2018-06-20 RX ADMIN — Medication 2 MILLIGRAM(S): at 22:25

## 2018-06-20 RX ADMIN — Medication 50 MILLIGRAM(S): at 05:23

## 2018-06-20 RX ADMIN — Medication 10 MILLIGRAM(S): at 17:37

## 2018-06-20 RX ADMIN — Medication 10 MILLIGRAM(S): at 05:26

## 2018-06-20 RX ADMIN — Medication 500000 UNIT(S): at 22:28

## 2018-06-20 RX ADMIN — MICAFUNGIN SODIUM 105 MILLIGRAM(S): 100 INJECTION, POWDER, LYOPHILIZED, FOR SOLUTION INTRAVENOUS at 17:37

## 2018-06-20 RX ADMIN — Medication 50 MILLIGRAM(S): at 08:28

## 2018-06-20 RX ADMIN — Medication 500000 UNIT(S): at 17:36

## 2018-06-20 RX ADMIN — GABAPENTIN 200 MILLIGRAM(S): 400 CAPSULE ORAL at 05:22

## 2018-06-20 RX ADMIN — MORPHINE SULFATE 200 MILLIGRAM(S): 50 CAPSULE, EXTENDED RELEASE ORAL at 13:40

## 2018-06-20 RX ADMIN — MORPHINE SULFATE 200 MILLIGRAM(S): 50 CAPSULE, EXTENDED RELEASE ORAL at 22:25

## 2018-06-20 RX ADMIN — Medication 2 MILLIGRAM(S): at 13:39

## 2018-06-20 RX ADMIN — MORPHINE SULFATE 200 MILLIGRAM(S): 50 CAPSULE, EXTENDED RELEASE ORAL at 06:24

## 2018-06-20 RX ADMIN — Medication 10 MILLIGRAM(S): at 11:44

## 2018-06-20 RX ADMIN — PANTOPRAZOLE SODIUM 40 MILLIGRAM(S): 20 TABLET, DELAYED RELEASE ORAL at 05:26

## 2018-06-20 RX ADMIN — Medication 2 MILLIGRAM(S): at 08:27

## 2018-06-20 RX ADMIN — Medication 0.5 MILLIGRAM(S): at 06:24

## 2018-06-20 RX ADMIN — Medication 3 MILLILITER(S): at 19:18

## 2018-06-20 NOTE — PROGRESS NOTE ADULT - ASSESSMENT
SUBJECTIVE:    Patient is a 47y old Female who presents with a chief complaint of worsening laryngeal candidiasis (04 Jun 2018 20:08)    Currently admitted to medicine with the primary diagnosis of Candida esophagitis.     Today is hospital day 16d. Overnight: Patient systolic blood pressure 195-216 with associated chest tightness that radiated diffusely to her lower back. EKG was performed and showed no acute changes. She also developed chills during this time.    She was seen this morning at bedside and was in no acute discomfort. She reports continued non-productive cough, however, she was able to provide sputum sample overnight with a green production. She refused daily salumedrol, attributing her symptoms to the steroids. She is no longer c/o nausea and vomiting.     PAST MEDICAL & SURGICAL HISTORY  DVT (deep venous thrombosis)  COPD (chronic obstructive pulmonary disease)  CHF (congestive heart failure)  HTN (hypertension)  RA (rheumatoid arthritis)  Throat cancer  AICD (automatic cardioverter/defibrillator) present  Lupus  History of laryngeal cancer  S/P cholecystectomy  S/P appendectomy    SOCIAL HISTORY:  Negative for smoking/alcohol/drug use.     ALLERGIES:  Avelox (Other)  Plaquenil (Other)  Vantin (Other (Mild))    MEDICATIONS:  STANDING MEDICATIONS  ALBUTerol/ipratropium for Nebulization 3 milliLiter(s) Nebulizer every 6 hours  ALPRAZolam 2 milliGRAM(s) Oral three times a day  azithromycin   Tablet 250 milliGRAM(s) Oral daily  bismuth subsalicylate Liquid 30 milliLiter(s) Oral daily  diphenhydrAMINE   Injectable 25 milliGRAM(s) IV Push once  docusate sodium 100 milliGRAM(s) Oral three times a day  fluticasone furoate/vilanterol 100-25 MICROgram(s) Inhaler 1 Puff(s) Inhalation daily  folic acid 1 milliGRAM(s) Oral daily  gabapentin 200 milliGRAM(s) Oral two times a day  hydrALAZINE 25 milliGRAM(s) Oral every 12 hours  hydrOXYzine hydrochloride 25 milliGRAM(s) Oral two times a day  methylPREDNISolone sodium succinate Injectable 40 milliGRAM(s) IV Push daily  metoclopramide 10 milliGRAM(s) Oral three times a day before meals  metoprolol succinate ER 25 milliGRAM(s) Oral daily  micafungin IVPB 100 milliGRAM(s) IV Intermittent every 24 hours  morphine ER Tablet 200 milliGRAM(s) Oral every 8 hours  nystatin    Suspension 573965 Unit(s) Oral four times a day  pantoprazole    Tablet 40 milliGRAM(s) Oral before breakfast  rivaroxaban 20 milliGRAM(s) Oral every 24 hours  senna 2 Tablet(s) Oral at bedtime  traZODone 100 milliGRAM(s) Oral at bedtime    PRN MEDICATIONS  guaiFENesin/dextromethorphan  Syrup 10 milliLiter(s) Oral every 6 hours PRN  HYDROmorphone   Tablet 8 milliGRAM(s) Oral every 4 hours PRN  ondansetron Injectable 4 milliGRAM(s) IV Push every 6 hours PRN    VITALS:   T(F): 97.2  HR: 78  BP: 157/80  RR: 20  SpO2: 99%    PHYSICAL EXAM:  GEN: No acute distress.  LUNGS: Course lung sounds BL with end-expiratory wheezes on auscultation.   HEART: S1/S2 present. RRR with no M/R/G appreciated on auscultation.   ABD: Soft, non-tender, non-distended. Bowel sounds present.  EXT: BL 1+ edema in lower extremities below the knee  NEURO: AAOX3    LABS:                        12.9   19.67 )-----------( 308      ( 20 Jun 2018 06:24 )             40.2     06-20    139  |  97<L>  |  29<H>  ----------------------------<  120<H>  4.0   |  26  |  1.1    Ca    9.6      20 Jun 2018 06:24      Culture - Sputum (collected 19 Jun 2018 23:00)  Source: .Sputum Sputum  Gram Stain (20 Jun 2018 14:50):    Moderate polymorphonuclear leukocytes seen per low power field    Few Squamous epithelial cells seen per low power field    Numerous Gram Positive Cocci seen per oil power field    Moderate Gram Negative Rods seen per oil power field      RADIOLOGY:  No new imaging.      Intravenous access: Peripheral line   NG tube:  None  Rodarte cathter: None    Assessment / Plan  46 y/o F with significant PMH of Lupus, CHF s/p AICD+PPM, DVT on xarelto, COPD, RA, laryngeal cancer s/p excision in november, s/p  2 week history of esophageal thrush 4 weeks ago status post PO diflucan treatment.     1. N/V and abdominal pain- gastroenteritis viral vs. bacteria vs. micafungin side effect  - pt afebrile, hold on Abx for now  - KUB neg for free air, CT A/P : WNL  - pt reports normal BMs  - Zofran PRN  -  hepatic panel: WNL    2. COPD exacerbation , STILL Wheezing diffusely   - Daily peak flow  - Continue Duoneb q 4hrs and PRN  - Course of Azithromycin  - C/w Solumedrol IV for today.   - BREO ONCE DAILY  - Smoking cessation  - NOT READY FOR DC    3. TIAN on CKD likely 2/2 to prerenal as per renal recs- improving  - amphotericin stopped  - c/w to monitor BMP for Cr  - urine studies--> FeNa 1.3 (intrinsic renal injury)  - gently hydrated, stopped for c/o edema--> edema improved    4. Laryngeal Candidiasis, s/p EGD 5/15/2018  - c/w IV micafungin while inpatient, Itraconazole 200mg PO BID x 7D outpatient as per ID rec  - HIV negative (5/10)  - f/u CXR --> no ACS   - f/u blood cultures - negative    5. Throat Cancer s/p excision in Nov 2017; as per pt she was put on CellCept for her LUPUS 2 years ago by her Rheumatologist  and stopped taking it in Feb2018. She said she is not on any other chemotherapy.   - Heme/onc consult ordered --> f/u with her doctor outpatient; pt was seen by Heme/Onc () on last admission.      6. CHF s/p AICD+PPM:   - echo done 6/7 showed LVEF 65%     7. Acute DVT  - On Xarelto.    8. HTN - stable  - c/w toprol,   - d/c hydralazine    9. RA - controlled  - c/w outpt benlysta + methotrexate shots  - c/w folic acid supplementation    10. Chronic Pain 2/2 Multiple Back Sx  - c/w MS contin   - c/w dilaudid PRN  - c/w gabapentin    11. Anxiety  - c/w xanax PRN  - c/w trazodone    12. Hyperkalemia - resolved    13. DVT PPX: on Hep drip  14. GI PPx: protonix  15. Dispo: Home after medically stable SUBJECTIVE:    Patient is a 47y old Female who presents with a chief complaint of worsening laryngeal candidiasis (04 Jun 2018 20:08)    Currently admitted to medicine with the primary diagnosis of Candida esophagitis.     Today is hospital day 16d. Overnight: Patient systolic blood pressure 195-216 with associated chest tightness that radiated diffusely to her lower back. EKG was performed and showed no acute changes. She also developed chills during this time.    She was seen this morning at bedside and was in no acute discomfort. She reports continued non-productive cough, however, she was able to provide sputum sample overnight with a green production. She refused daily salumedrol today, as she is attributing her symptoms to the steroids. She is no longer c/o nausea and vomiting at this time.    PAST MEDICAL & SURGICAL HISTORY  DVT (deep venous thrombosis)  COPD (chronic obstructive pulmonary disease)  CHF (congestive heart failure)  HTN (hypertension)  RA (rheumatoid arthritis)  Throat cancer  AICD (automatic cardioverter/defibrillator) present  Lupus  History of laryngeal cancer  S/P cholecystectomy  S/P appendectomy    SOCIAL HISTORY:  Negative for smoking/alcohol/drug use.     ALLERGIES:  Avelox (Other)  Plaquenil (Other)  Vantin (Other (Mild))    MEDICATIONS:  STANDING MEDICATIONS  ALBUTerol/ipratropium for Nebulization 3 milliLiter(s) Nebulizer every 6 hours  ALPRAZolam 2 milliGRAM(s) Oral three times a day  azithromycin   Tablet 250 milliGRAM(s) Oral daily  bismuth subsalicylate Liquid 30 milliLiter(s) Oral daily  diphenhydrAMINE   Injectable 25 milliGRAM(s) IV Push once  docusate sodium 100 milliGRAM(s) Oral three times a day  fluticasone furoate/vilanterol 100-25 MICROgram(s) Inhaler 1 Puff(s) Inhalation daily  folic acid 1 milliGRAM(s) Oral daily  gabapentin 200 milliGRAM(s) Oral two times a day  hydrALAZINE 25 milliGRAM(s) Oral every 12 hours  hydrOXYzine hydrochloride 25 milliGRAM(s) Oral two times a day  methylPREDNISolone sodium succinate Injectable 40 milliGRAM(s) IV Push daily  metoclopramide 10 milliGRAM(s) Oral three times a day before meals  metoprolol succinate ER 25 milliGRAM(s) Oral daily  micafungin IVPB 100 milliGRAM(s) IV Intermittent every 24 hours  morphine ER Tablet 200 milliGRAM(s) Oral every 8 hours  nystatin    Suspension 525115 Unit(s) Oral four times a day  pantoprazole    Tablet 40 milliGRAM(s) Oral before breakfast  rivaroxaban 20 milliGRAM(s) Oral every 24 hours  senna 2 Tablet(s) Oral at bedtime  traZODone 100 milliGRAM(s) Oral at bedtime    PRN MEDICATIONS  guaiFENesin/dextromethorphan  Syrup 10 milliLiter(s) Oral every 6 hours PRN  HYDROmorphone   Tablet 8 milliGRAM(s) Oral every 4 hours PRN  ondansetron Injectable 4 milliGRAM(s) IV Push every 6 hours PRN    VITALS:   T(F): 97.2  HR: 78  BP: 157/80  RR: 20  SpO2: 99%    PHYSICAL EXAM:  GEN: No acute distress.  HEAD: Mild, diffuse facial edema.  LUNGS: Course lung sounds BL with end-expiratory wheezes on auscultation.   HEART: S1/S2 present. RRR with no M/R/G appreciated on auscultation.   ABD: Soft, non-tender, non-distended. Bowel sounds present.  EXT: BL 1+ edema in lower extremities below the knee; Mild edema in L upper extremity with medial ecchymosis and erythema on the arm.   NEURO: AAOX3    LABS:                        12.9   19.67 )-----------( 308      ( 20 Jun 2018 06:24 )             40.2     06-20    139  |  97<L>  |  29<H>  ----------------------------<  120<H>  4.0   |  26  |  1.1    Ca    9.6      20 Jun 2018 06:24      Culture - Sputum (collected 19 Jun 2018 23:00)  Source: .Sputum Sputum  Gram Stain (20 Jun 2018 14:50):    Moderate polymorphonuclear leukocytes seen per low power field    Few Squamous epithelial cells seen per low power field    Numerous Gram Positive Cocci seen per oil power field    Moderate Gram Negative Rods seen per oil power field      RADIOLOGY:  No new imaging.      Intravenous access: Peripheral line   NG tube:  None  Rodarte cathter: None    Assessment / Plan  46 y/o F with significant PMH of Lupus, CHF s/p AICD+PPM, DVT on xarelto, COPD, RA, laryngeal cancer s/p excision in november, s/p  2 week history of esophageal thrush 4 weeks ago status post PO diflucan treatment.     1. COPD exacerbation , STILL Wheezing diffusely   - Daily peak flow  - Continue Duoneb q 4hrs and PRN  - Course of Azithromycin  - C/w Solumedrol IV for today.   - BREO ONCE DAILY  - Smoking cessation  - NOT READY FOR DC    2. N/V and abdominal pain- gastroenteritis viral vs. bacteria vs. micafungin side effect  - Patient afebrile, holding abx  - KUB neg for free air, CT A/P : WNL  - pt reports normal BMs  - Zofran PRN  - Hepatic panel: WNL    3. TIAN on CKD likely 2/2 to prerenal as per renal recs- improving  - amphotericin stopped  - c/w to monitor BMP for Cr  - urine studies--> FeNa 1.3 (intrinsic renal injury)  - gently hydrated, stopped for c/o edema--> edema improved    4. Laryngeal Candidiasis, s/p EGD 5/15/2018  - c/w IV micafungin while inpatient, Itraconazole 200mg PO BID x 7D outpatient as per ID rec  - HIV negative (5/10)  - f/u CXR --> no ACS   - f/u blood cultures - negative    5. Throat Cancer s/p excision in Nov 2017; as per pt she was put on CellCept for her LUPUS 2 years ago by her Rheumatologist  and stopped taking it in Feb2018. She said she is not on any other chemotherapy.   - Heme/onc consult ordered --> f/u with her doctor outpatient; pt was seen by Heme/Onc () on last admission.    6. CHF s/p AICD + PPM:   - Echo done (6/7) showed LVEF 65%     7. Acute DVT  - On Xarelto.    8. HTN - stable  - c/w toprol,   - d/c hydralazine    9. RA - controlled  - c/w outpt benlysta + methotrexate shots  - c/w folic acid supplementation    10. Chronic Pain 2/2 Multiple Back Sx  - c/w MS contin   - c/w dilaudid PRN  - c/w gabapentin    11. Anxiety  - c/w xanax PRN  - c/w trazodone    12. Hyperkalemia - resolved    13. DVT PPX: on Hep drip  14. GI PPx: protonix  15. Dispo: Home after medically stable SUBJECTIVE:    Patient is a 47y old Female who presents with a chief complaint of worsening laryngeal candidiasis (04 Jun 2018 20:08)    Currently admitted to medicine with the primary diagnosis of Candida esophagitis.     Today is hospital day 16d. Overnight: Patient systolic blood pressure 195-216 with associated chest tightness that radiated diffusely to her lower back. EKG was performed and showed no acute changes. She also developed chills during this time.    She was seen this morning at bedside and was in no acute discomfort. She reports continued non-productive cough, however, she was able to provide sputum sample overnight with a green production. She refused daily salumedrol today, as she is attributing her symptoms to the steroids. She is no longer c/o nausea and vomiting at this time.    PAST MEDICAL & SURGICAL HISTORY  DVT (deep venous thrombosis)  COPD (chronic obstructive pulmonary disease)  CHF (congestive heart failure)  HTN (hypertension)  RA (rheumatoid arthritis)  Throat cancer  AICD (automatic cardioverter/defibrillator) present  Lupus  History of laryngeal cancer  S/P cholecystectomy  S/P appendectomy    SOCIAL HISTORY:  Negative for smoking/alcohol/drug use.     ALLERGIES:  Avelox (Other)  Plaquenil (Other)  Vantin (Other (Mild))    MEDICATIONS:  STANDING MEDICATIONS  ALBUTerol/ipratropium for Nebulization 3 milliLiter(s) Nebulizer every 6 hours  ALPRAZolam 2 milliGRAM(s) Oral three times a day  azithromycin   Tablet 250 milliGRAM(s) Oral daily  bismuth subsalicylate Liquid 30 milliLiter(s) Oral daily  diphenhydrAMINE   Injectable 25 milliGRAM(s) IV Push once  docusate sodium 100 milliGRAM(s) Oral three times a day  fluticasone furoate/vilanterol 100-25 MICROgram(s) Inhaler 1 Puff(s) Inhalation daily  folic acid 1 milliGRAM(s) Oral daily  gabapentin 200 milliGRAM(s) Oral two times a day  hydrALAZINE 25 milliGRAM(s) Oral every 12 hours  hydrOXYzine hydrochloride 25 milliGRAM(s) Oral two times a day  methylPREDNISolone sodium succinate Injectable 40 milliGRAM(s) IV Push daily  metoclopramide 10 milliGRAM(s) Oral three times a day before meals  metoprolol succinate ER 25 milliGRAM(s) Oral daily  micafungin IVPB 100 milliGRAM(s) IV Intermittent every 24 hours  morphine ER Tablet 200 milliGRAM(s) Oral every 8 hours  nystatin    Suspension 095555 Unit(s) Oral four times a day  pantoprazole    Tablet 40 milliGRAM(s) Oral before breakfast  rivaroxaban 20 milliGRAM(s) Oral every 24 hours  senna 2 Tablet(s) Oral at bedtime  traZODone 100 milliGRAM(s) Oral at bedtime    PRN MEDICATIONS  guaiFENesin/dextromethorphan  Syrup 10 milliLiter(s) Oral every 6 hours PRN  HYDROmorphone   Tablet 8 milliGRAM(s) Oral every 4 hours PRN  ondansetron Injectable 4 milliGRAM(s) IV Push every 6 hours PRN    VITALS:   T(F): 97.2  HR: 78  BP: 157/80  RR: 20  SpO2: 99%    PHYSICAL EXAM:  GEN: No acute distress.  HEAD: Mild, diffuse facial edema.  LUNGS: Course lung sounds BL with end-expiratory wheezes on auscultation.   HEART: S1/S2 present. RRR with no M/R/G appreciated on auscultation.   ABD: Soft, non-tender, non-distended. Bowel sounds present.  EXT: BL 1+ edema in lower extremities below the knee; Mild edema in L upper extremity with medial ecchymosis and erythema on the arm.   NEURO: AAOX3    LABS:                        12.9   19.67 )-----------( 308      ( 20 Jun 2018 06:24 )             40.2     06-20    139  |  97<L>  |  29<H>  ----------------------------<  120<H>  4.0   |  26  |  1.1    Ca    9.6      20 Jun 2018 06:24      Culture - Sputum (collected 19 Jun 2018 23:00)  Source: .Sputum Sputum  Gram Stain (20 Jun 2018 14:50):    Moderate polymorphonuclear leukocytes seen per low power field    Few Squamous epithelial cells seen per low power field    Numerous Gram Positive Cocci seen per oil power field    Moderate Gram Negative Rods seen per oil power field      RADIOLOGY:  No new imaging.      Intravenous access: Peripheral line   NG tube:  None  Rodarte cathter: None    Assessment / Plan  46 y/o F with significant PMH of Lupus, CHF s/p AICD+PPM, DVT on xarelto, COPD, RA, laryngeal cancer s/p excision in november, s/p  2 week history of esophageal thrush 4 weeks ago status post PO diflucan treatment.     1. COPD exacerbation , STILL Wheezing diffusely   - Daily peak flow  - Continue Duoneb q 4hrs and PRN  - Course of Azithromycin  - Change solumedrol to 40 mg once daily for 5 days, than 20 mg once daily for 5 days.  - BREO ONCE DAILY  - Smoking cessation  - F/U outpatient from pulm standpoint.    2. N/V and abdominal pain- gastroenteritis viral vs. bacteria vs. micafungin side effect  - Patient afebrile, holding abx  - KUB neg for free air, CT A/P : WNL  - pt reports normal BMs  - Zofran PRN  - Hepatic panel: WNL    3. TIAN on CKD likely 2/2 to prerenal as per renal recs- improving  - amphotericin stopped  - c/w to monitor BMP for Cr  - urine studies--> FeNa 1.3 (intrinsic renal injury)  - gently hydrated, stopped for c/o edema--> edema improved    4. Laryngeal Candidiasis, s/p EGD 5/15/2018  - c/w IV micafungin while inpatient, Itraconazole 200mg PO BID x 7D outpatient as per ID rec  - HIV negative (5/10)  - f/u CXR --> no ACS   - f/u blood cultures - negative    5. Throat Cancer s/p excision in Nov 2017; as per pt she was put on CellCept for her LUPUS 2 years ago by her Rheumatologist  and stopped taking it in Feb2018. She said she is not on any other chemotherapy.   - Heme/onc consult ordered --> f/u with her doctor outpatient; pt was seen by Heme/Onc () on last admission.    6. CHF s/p AICD + PPM:   - Echo done (6/7) showed LVEF 65%  -F/U with cardiology outpatient, as per pulmon recommendation.   -Decrease metoprolol to 25 mg daily.  -Start Hydralazine 25 mg bid one dose now.    7. Acute DVT  - On Xarelto    8. HTN - stable  - c/w toprol,   - d/c hydralazine    9. RA - controlled  - c/w outpt benlysta + methotrexate shots  - c/w folic acid supplementation    10. Chronic Pain 2/2 Multiple Back Sx  - c/w MS contin   - c/w dilaudid PRN  - c/w gabapentin    11. Anxiety  - c/w xanax PRN  - c/w trazodone    12. Hyperkalemia - resolved    13. DVT PPX: on Hep drip  14. GI PPx: protonix  15. Dispo: Home after medically stable SUBJECTIVE:    Patient is a 47y old Female who presents with a chief complaint of worsening laryngeal candidiasis (04 Jun 2018 20:08)    Currently admitted to medicine with the primary diagnosis of Candida esophagitis.     Today is hospital day 16d. Overnight: Patient systolic blood pressure 195-216 with associated chest tightness that radiated diffusely to her lower back. EKG was performed and showed no acute changes. She also developed chills during this time.    She was seen this morning at bedside and was in no acute discomfort. She reports continued non-productive cough, however, she was able to provide sputum sample overnight with a green production. She refused daily salumedrol today, as she is attributing her symptoms to the steroids. She is no longer c/o nausea and vomiting at this time.    PAST MEDICAL & SURGICAL HISTORY  DVT (deep venous thrombosis)  COPD (chronic obstructive pulmonary disease)  CHF (congestive heart failure)  HTN (hypertension)  RA (rheumatoid arthritis)  Throat cancer  AICD (automatic cardioverter/defibrillator) present  Lupus  History of laryngeal cancer  S/P cholecystectomy  S/P appendectomy    SOCIAL HISTORY:  Negative for smoking/alcohol/drug use.     ALLERGIES:  Avelox (Other)  Plaquenil (Other)  Vantin (Other (Mild))    MEDICATIONS:  STANDING MEDICATIONS  ALBUTerol/ipratropium for Nebulization 3 milliLiter(s) Nebulizer every 6 hours  ALPRAZolam 2 milliGRAM(s) Oral three times a day  azithromycin   Tablet 250 milliGRAM(s) Oral daily  bismuth subsalicylate Liquid 30 milliLiter(s) Oral daily  diphenhydrAMINE   Injectable 25 milliGRAM(s) IV Push once  docusate sodium 100 milliGRAM(s) Oral three times a day  fluticasone furoate/vilanterol 100-25 MICROgram(s) Inhaler 1 Puff(s) Inhalation daily  folic acid 1 milliGRAM(s) Oral daily  gabapentin 200 milliGRAM(s) Oral two times a day  hydrALAZINE 25 milliGRAM(s) Oral every 12 hours  hydrOXYzine hydrochloride 25 milliGRAM(s) Oral two times a day  methylPREDNISolone sodium succinate Injectable 40 milliGRAM(s) IV Push daily  metoclopramide 10 milliGRAM(s) Oral three times a day before meals  metoprolol succinate ER 25 milliGRAM(s) Oral daily  micafungin IVPB 100 milliGRAM(s) IV Intermittent every 24 hours  morphine ER Tablet 200 milliGRAM(s) Oral every 8 hours  nystatin    Suspension 115447 Unit(s) Oral four times a day  pantoprazole    Tablet 40 milliGRAM(s) Oral before breakfast  rivaroxaban 20 milliGRAM(s) Oral every 24 hours  senna 2 Tablet(s) Oral at bedtime  traZODone 100 milliGRAM(s) Oral at bedtime    PRN MEDICATIONS  guaiFENesin/dextromethorphan  Syrup 10 milliLiter(s) Oral every 6 hours PRN  HYDROmorphone   Tablet 8 milliGRAM(s) Oral every 4 hours PRN  ondansetron Injectable 4 milliGRAM(s) IV Push every 6 hours PRN    VITALS:   T(F): 97.2  HR: 78  BP: 157/80  RR: 20  SpO2: 99%    PHYSICAL EXAM:  GEN: No acute distress.  HEAD: Mild, diffuse facial edema.  LUNGS: Course lung sounds BL with end-expiratory wheezes on auscultation.   HEART: S1/S2 present. RRR with no M/R/G appreciated on auscultation.   ABD: Soft, non-tender, non-distended. Bowel sounds present.  EXT: BL 1+ edema in lower extremities below the knee; Mild edema in L upper extremity with medial ecchymosis and erythema on the arm.   NEURO: AAOX3    LABS:                        12.9   19.67 )-----------( 308      ( 20 Jun 2018 06:24 )             40.2     06-20    139  |  97<L>  |  29<H>  ----------------------------<  120<H>  4.0   |  26  |  1.1    Ca    9.6      20 Jun 2018 06:24      Culture - Sputum (collected 19 Jun 2018 23:00)  Source: .Sputum Sputum  Gram Stain (20 Jun 2018 14:50):    Moderate polymorphonuclear leukocytes seen per low power field    Few Squamous epithelial cells seen per low power field    Numerous Gram Positive Cocci seen per oil power field    Moderate Gram Negative Rods seen per oil power field      RADIOLOGY:  No new imaging.      Intravenous access: Peripheral line   NG tube:  None  Rodarte cathter: None    Assessment / Plan  48 y/o F with significant PMH of Lupus, CHF s/p AICD+PPM, DVT on xarelto, COPD, RA, laryngeal cancer s/p excision in november, s/p  2 week history of esophageal thrush 4 weeks ago status post PO diflucan treatment.     1. COPD exacerbation , STILL Wheezing diffusely   - Daily peak flow  - Continue Duoneb q 4hrs and PRN  - Course of Azithromycin  - Change solumedrol to 40 mg once daily for 5 days, than 20 mg once daily for 5 days.  - BREO ONCE DAILY  - Smoking cessation  - F/U outpatient from pulm standpoint.    2. N/V and abdominal pain- gastroenteritis viral vs. bacteria vs. micafungin side effect  - Patient afebrile, holding abx  - KUB neg for free air, CT A/P : WNL  - pt reports normal BMs  - Zofran PRN  - Hepatic panel: WNL    3. TIAN on CKD likely 2/2 to prerenal as per renal recs- improving  - amphotericin stopped  - c/w to monitor BMP for Cr  - urine studies--> FeNa 1.3 (intrinsic renal injury)  - gently hydrated, stopped for c/o edema--> edema improved    4. Laryngeal Candidiasis, s/p EGD 5/15/2018  - c/w IV micafungin while inpatient, Itraconazole 200mg PO BID x 7D outpatient as per ID rec  - HIV negative (5/10)  - f/u CXR --> no ACS   - f/u blood cultures - negative    5. Throat Cancer s/p excision in Nov 2017; as per pt she was put on CellCept for her LUPUS 2 years ago by her Rheumatologist  and stopped taking it in Feb2018. She said she is not on any other chemotherapy.   - Heme/onc consult ordered --> f/u with her doctor outpatient; pt was seen by Heme/Onc () on last admission.    6. CHF s/p AICD + PPM:   - Echo done (6/7) showed LVEF 65%  -F/U with cardiology outpatient, as per pulmon recommendation.     7. Acute DVT  - On Xarelto    8. HTN - stable  -Decrease metoprolol to 25 mg daily.  -Start Hydralazine 25 mg bid one dose now.    9. LUE tenderness  -Check US duplex r/o thrombophlebitis    10. RA - controlled  - c/w outpt benlysta + methotrexate shots  - c/w folic acid supplementation    11. Chronic Pain 2/2 Multiple Back Sx  - c/w MS contin   - c/w dilaudid PRN  - c/w gabapentin    12. Anxiety  - c/w xanax PRN  - c/w trazodone    13. Hyperkalemia - resolved    14. DVT PPX: on Hep drip  15. GI PPx: protonix  16. Dispo: Home after medically stable SUBJECTIVE:    Patient is a 47y old Female who presents with a chief complaint of worsening laryngeal candidiasis (04 Jun 2018 20:08)    Currently admitted to medicine with the primary diagnosis of Candida esophagitis.     Today is hospital day 16d. Overnight: Patient systolic blood pressure 195-216 with associated chest tightness that radiated diffusely to her lower back. EKG was performed and showed no acute changes. She also developed chills during this time.    She was seen this morning at bedside and was in no acute discomfort. She reports continued non-productive cough, however, she was able to provide sputum sample overnight with a green production. She refused daily salumedrol today, as she is attributing her symptoms to the steroids. She is no longer c/o nausea and vomiting at this time.    PAST MEDICAL & SURGICAL HISTORY  DVT (deep venous thrombosis)  COPD (chronic obstructive pulmonary disease)  CHF (congestive heart failure)  HTN (hypertension)  RA (rheumatoid arthritis)  Throat cancer  AICD (automatic cardioverter/defibrillator) present  Lupus  History of laryngeal cancer  S/P cholecystectomy  S/P appendectomy    SOCIAL HISTORY:  Negative for smoking/alcohol/drug use.     ALLERGIES:  Avelox (Other)  Plaquenil (Other)  Vantin (Other (Mild))    MEDICATIONS:  STANDING MEDICATIONS  ALBUTerol/ipratropium for Nebulization 3 milliLiter(s) Nebulizer every 6 hours  ALPRAZolam 2 milliGRAM(s) Oral three times a day  azithromycin   Tablet 250 milliGRAM(s) Oral daily  bismuth subsalicylate Liquid 30 milliLiter(s) Oral daily  diphenhydrAMINE   Injectable 25 milliGRAM(s) IV Push once  docusate sodium 100 milliGRAM(s) Oral three times a day  fluticasone furoate/vilanterol 100-25 MICROgram(s) Inhaler 1 Puff(s) Inhalation daily  folic acid 1 milliGRAM(s) Oral daily  gabapentin 200 milliGRAM(s) Oral two times a day  hydrALAZINE 25 milliGRAM(s) Oral every 12 hours  hydrOXYzine hydrochloride 25 milliGRAM(s) Oral two times a day  methylPREDNISolone sodium succinate Injectable 40 milliGRAM(s) IV Push daily  metoclopramide 10 milliGRAM(s) Oral three times a day before meals  metoprolol succinate ER 25 milliGRAM(s) Oral daily  micafungin IVPB 100 milliGRAM(s) IV Intermittent every 24 hours  morphine ER Tablet 200 milliGRAM(s) Oral every 8 hours  nystatin    Suspension 057771 Unit(s) Oral four times a day  pantoprazole    Tablet 40 milliGRAM(s) Oral before breakfast  rivaroxaban 20 milliGRAM(s) Oral every 24 hours  senna 2 Tablet(s) Oral at bedtime  traZODone 100 milliGRAM(s) Oral at bedtime    PRN MEDICATIONS  guaiFENesin/dextromethorphan  Syrup 10 milliLiter(s) Oral every 6 hours PRN  HYDROmorphone   Tablet 8 milliGRAM(s) Oral every 4 hours PRN  ondansetron Injectable 4 milliGRAM(s) IV Push every 6 hours PRN    VITALS:   T(F): 97.2  HR: 78  BP: 157/80  RR: 20  SpO2: 99%    PHYSICAL EXAM:  GEN: No acute distress.  HEAD: Mild, diffuse facial edema.  LUNGS: Course lung sounds BL with end-expiratory wheezes on auscultation.   HEART: S1/S2 present. RRR with no M/R/G appreciated on auscultation.   ABD: Soft, non-tender, non-distended. Bowel sounds present.  EXT: BL 1+ edema in lower extremities below the knee; Mild edema in L upper extremity with medial ecchymosis and erythema on the arm.   NEURO: AAOX3    LABS:                        12.9   19.67 )-----------( 308      ( 20 Jun 2018 06:24 )             40.2     06-20    139  |  97<L>  |  29<H>  ----------------------------<  120<H>  4.0   |  26  |  1.1    Ca    9.6      20 Jun 2018 06:24      Culture - Sputum (collected 19 Jun 2018 23:00)  Source: .Sputum Sputum  Gram Stain (20 Jun 2018 14:50):    Moderate polymorphonuclear leukocytes seen per low power field    Few Squamous epithelial cells seen per low power field    Numerous Gram Positive Cocci seen per oil power field    Moderate Gram Negative Rods seen per oil power field      RADIOLOGY:  No new imaging.      Intravenous access: Peripheral line   NG tube:  None  Rodarte cathter: None    Assessment / Plan  46 y/o F with significant PMH of Lupus, CHF s/p AICD+PPM, DVT on xarelto, COPD, RA, laryngeal cancer s/p excision in november, s/p  2 week history of esophageal thrush 4 weeks ago status post PO diflucan treatment.     1. COPD exacerbation , STILL Wheezing diffusely   - Daily peak flow  - Continue Duoneb q 4hrs and PRN  - Course of Azithromycin  - Change solumedrol to 40 mg once daily for 5 days, than 20 mg once daily for 5 days.  - Continue BREO Once Daily  - Smoking cessation  - F/U outpatient from pulm standpoint.    2. N/V and abdominal pain- gastroenteritis viral vs. bacteria vs. micafungin side effect  - Patient afebrile, holding abx  - KUB neg for free air, CT A/P : WNL  - pt reports normal BMs  - Zofran PRN  - Hepatic panel: WNL    3. TIAN on CKD likely 2/2 to prerenal as per renal recs - RESOLVED  - amphotericin stopped  - c/w to monitor BMP for Cr  - urine studies--> FeNa 1.3 (intrinsic renal injury)  - gently hydrated, stopped for c/o edema--> edema improved    4. Laryngeal Candidiasis, s/p EGD 5/15/2018  - c/w IV micafungin while inpatient, Itraconazole 200mg PO BID x 7D outpatient as per ID rec  - HIV negative (5/10)  - f/u CXR --> no ACS   - f/u blood cultures - negative    5. Throat Cancer s/p excision in Nov 2017; as per pt she was put on CellCept for her LUPUS 2 years ago by her Rheumatologist  and stopped taking it in Feb2018. She said she is not on any other chemotherapy.   - Heme/onc consult ordered --> f/u with her doctor outpatient; pt was seen by Heme/Onc () on last admission.    6. CHF s/p AICD + PPM:   - Echo done (6/7) showed LVEF 65%  -F/U with cardiology outpatient, as per pulmon recommendation.     7. Acute DVT  - On Xarelto    8. HTN - stable  -Decrease metoprolol to 25 mg daily.  -Start Hydralazine 25 mg bid one dose now.    9. LUE tenderness  -Follow up US duplex r/o thrombophlebitis    10. RA - controlled  - c/w outpt benlysta + methotrexate shots  - c/w folic acid supplementation    11. Chronic Pain 2/2 Multiple Back Sx  - c/w MS contin   - c/w dilaudid PRN  - c/w gabapentin    12. Anxiety  - c/w xanax PRN  - c/w trazodone    13. Hyperkalemia - Resolved    14. DVT PPX: on Hep drip  15. GI PPx: protonix  16. Dispo: Home after medically stable

## 2018-06-20 NOTE — PROGRESS NOTE ADULT - SUBJECTIVE AND OBJECTIVE BOX
ILEANA MCNEIL  47y  Female      Patient is a 47y old  Female who presents with a chief complaint of worsening laryngeal candidiasis (04 Jun 2018 20:08)      INTERVAL HPI/OVERNIGHT EVENTS: sob is improving. patient was anxious overnight. c/o LUE pain at prior blood draw site      REVIEW OF SYSTEMS:  as above  All other review of systems negative    T(C): 36.2 (06-20-18 @ 13:55), Max: 36.4 (06-20-18 @ 05:42)  HR: 78 (06-20-18 @ 13:55) (70 - 78)  BP: 157/80 (06-20-18 @ 13:55) (157/80 - 182/93)  RR: 20 (06-20-18 @ 13:55) (18 - 20)  SpO2: 99% (06-19-18 @ 19:54) (99% - 99%)  Wt(kg): --Vital Signs Last 24 Hrs  T(C): 36.2 (20 Jun 2018 13:55), Max: 36.4 (20 Jun 2018 05:42)  T(F): 97.2 (20 Jun 2018 13:55), Max: 97.5 (20 Jun 2018 05:42)  HR: 78 (20 Jun 2018 13:55) (70 - 78)  BP: 157/80 (20 Jun 2018 13:55) (157/80 - 182/93)  BP(mean): --  RR: 20 (20 Jun 2018 13:55) (18 - 20)  SpO2: 99% (19 Jun 2018 19:54) (99% - 99%)        PHYSICAL EXAM:  GENERAL: NAD  PSYCH: no agitation, baseline mentation, anxious but consolable  NERVOUS SYSTEM:  Alert & Oriented X3, no new focal deficits  HEENT: hoarse  PULMONARY: coarse expiratory wheezing b/l  CARDIOVASCULAR: Regular rate and rhythm; No murmurs, rubs, or gallops  GI: Soft, Nontender, Nondistended; Bowel sounds present  EXTREMITIES:  2+ Peripheral Pulses, No clubbing, cyanosis, or edema    Consultant(s) Notes Reviewed:  [x ] YES  [ ] NO    Discussed with Consultants/Other Providers [ x] YES     LABS                          12.9   19.67 )-----------( 308      ( 20 Jun 2018 06:24 )             40.2     06-20    139  |  97<L>  |  29<H>  ----------------------------<  120<H>  4.0   |  26  |  1.1    Ca    9.6      20 Jun 2018 06:24            Lactate Trend        CAPILLARY BLOOD GLUCOSE          Culture - Sputum (collected 06-19-18 @ 23:00)  Source: .Sputum Sputum  Gram Stain (06-20-18 @ 14:50):    Moderate polymorphonuclear leukocytes seen per low power field    Few Squamous epithelial cells seen per low power field    Numerous Gram Positive Cocci seen per oil power field    Moderate Gram Negative Rods seen per oil power field        RADIOLOGY & ADDITIONAL TESTS:    Imaging Personally Reviewed:  [ ] YES  [ ] NO    HEALTH ISSUES - PROBLEM Dx:

## 2018-06-20 NOTE — PROGRESS NOTE ADULT - ASSESSMENT
48 y/o F with significant PMH of Lupus, CHF s/p AICD+PPM, DVT on xarelto, COPD, RA, laryngeal cancer s/p excision in november, s/p  2 week history of esophageal thrush 4 weeks ago status post PO diflucan treatment.     # COPD exacerbation , actively wheezing  - c/w duonebs q4+prn,   titrate IV steroid  check peak flow  - c/w home breo  - ADVISED ON SMOKING CESSATION.  - added abx.    #LUE tenderness  check US duplex r/o thrombophlebitis    # N/V and abdominal pain- gastroenteritis viral vs bacteria vs micafungin side effect  resolved today  - pt afebrile, hold on Abx for now  - KUB neg for free air, CT A/P : WNL  - pt reports normal BMs  - Zofran PRN  -  hepatic panel: WNL    # TIAN on CKD likely 2/2 to prerenal as per renal recs- improved now  - amphotericin stopped  - c/w to monitor BMP for Cr,   - urine studies--> FeNa 1.3 (intrinsic renal injury)  - gently hydrated, stopped for c/o edema--> edema improved    # Laryngeal Candidiasis, s/p EGD 5/15/2018  - c/w IV micafungin while inpatient, Itraconazole 200mg PO BID x 7D outpatient as per ID rec  - HIV negative (5/10)  - f/u CXR --> no ACS   - f/u blood cultures - negative    # Throat Cancer s/p excision in Nov 2017; as per pt she was put on CellCept for her LUPUS 2 years ago by her Rheumatologist  and stopped taking it in Feb2018. She said she is not on any other chemotherapy.   - Heme/onc consult ordered --> f/u with her doctor outpatient; pt was seen by Heme/Onc () on last admission.      # CHF s/p AICD+PPM:   - echo done 6/7 showed LVEF 65%     # Acute DVT  - On Xarelto.    # HTN - stable  - c/w toprol,   - d/c hydralazine    # RA - controlled  - c/w outpt benlysta + methotrexate shots  - c/w folic acid supplementation    # Chronic Pain 2/2 Multiple Back Sx  - c/w MS contin   - c/w dilaudid PRN  - c/w gabapentin    # Anxiety  - c/w xanax PRN per home dosing  - c/w trazodone    # hyperkalemia - resolved      DVT PPX: on Hep drip  GI PPx: protonix  Dispo: Home after medically stable

## 2018-06-20 NOTE — PROGRESS NOTE ADULT - SUBJECTIVE AND OBJECTIVE BOX
OVERNIGHT EVENTS: still c/o cough, wheezing, episode of palpitation, increase bp    Vital Signs Last 24 Hrs  T(C): 36.4 (20 Jun 2018 05:42), Max: 36.4 (19 Jun 2018 14:06)  T(F): 97.5 (20 Jun 2018 05:42), Max: 97.6 (19 Jun 2018 14:06)  HR: 70 (20 Jun 2018 05:42) (70 - 84)  BP: 182/93 (20 Jun 2018 05:42) (166/71 - 182/93)  BP(mean): --  RR: 18 (20 Jun 2018 05:42) (18 - 18)  SpO2: 99% (19 Jun 2018 19:54) (99% - 99%)    PHYSICAL EXAMINATION:    GENERAL: The patient is awake and alert in no apparent distress.     HEENT: Head is normocephalic and atraumatic. Extraocular muscles are intact. Mucous membranes are moist.    NECK: Supple.    LUNGS: b/l rhonchi/ wheezing    HEART: Regular rate and rhythm without murmur.    ABDOMEN: Soft, nontender, and nondistended.      EXTREMITIES: Without any cyanosis, clubbing, rash, lesions or edema.    NEUROLOGIC: Grossly intact.    SKIN: No ulceration or induration present.      LABS:                        12.9   19.67 )-----------( 308      ( 20 Jun 2018 06:24 )             40.2     06-20    139  |  97<L>  |  29<H>  ----------------------------<  120<H>  4.0   |  26  |  1.1    Ca    9.6      20 Jun 2018 06:24                              MICROBIOLOGY:      MEDICATIONS  (STANDING):  ALBUTerol/ipratropium for Nebulization 3 milliLiter(s) Nebulizer every 6 hours  ALPRAZolam 2 milliGRAM(s) Oral three times a day  azithromycin   Tablet 250 milliGRAM(s) Oral daily  bismuth subsalicylate Liquid 30 milliLiter(s) Oral daily  diphenhydrAMINE   Injectable 25 milliGRAM(s) IV Push once  docusate sodium 100 milliGRAM(s) Oral three times a day  fluticasone furoate/vilanterol 100-25 MICROgram(s) Inhaler 1 Puff(s) Inhalation daily  folic acid 1 milliGRAM(s) Oral daily  gabapentin 200 milliGRAM(s) Oral two times a day  hydrOXYzine hydrochloride 25 milliGRAM(s) Oral two times a day  methylPREDNISolone sodium succinate Injectable 60 milliGRAM(s) IV Push three times a day  metoclopramide 10 milliGRAM(s) Oral three times a day before meals  metoprolol succinate ER 50 milliGRAM(s) Oral daily  micafungin IVPB 100 milliGRAM(s) IV Intermittent every 24 hours  morphine ER Tablet 200 milliGRAM(s) Oral every 8 hours  nystatin    Suspension [id number] Unit(s) Oral four times a day  pantoprazole    Tablet 40 milliGRAM(s) Oral before breakfast  rivaroxaban 20 milliGRAM(s) Oral every 24 hours  senna 2 Tablet(s) Oral at bedtime  traZODone 100 milliGRAM(s) Oral at bedtime    MEDICATIONS  (PRN):  guaiFENesin/dextromethorphan  Syrup 10 milliLiter(s) Oral every 6 hours PRN Cough  HYDROmorphone   Tablet 8 milliGRAM(s) Oral every 4 hours PRN Severe Pain (7 - 10)  ondansetron Injectable 4 milliGRAM(s) IV Push every 6 hours PRN Nausea and/or Vomiting      RADIOLOGY & ADDITIONAL STUDIES:

## 2018-06-20 NOTE — PROGRESS NOTE ADULT - ASSESSMENT
Impression:    COPD exacerbation  Active smoker  INCREASE BP      Recommendations:    Duoneb q 4hrs and PRN  Course of Azithromycin  change solumedrol to 40 mg once daily for 5 days, than 20 mg once daily for 5 days. BREO ONCE DAILY  Smoking cessation  DVT Px  cardio f/up/ bp control  pulmonary standpoint op f/up

## 2018-06-20 NOTE — CONSULT NOTE ADULT - SUBJECTIVE AND OBJECTIVE BOX
ILEANA MCNEIL  47y, Female  Allergy: Avelox (Other)  Plaquenil (Other)  Vantin (Other (Mild))      HPI:  48 y/o F with significant PMH of CHF s/p AICD+PPM, DVT on xarelto, COPD, RA, laryngeal cancer s/p excision in november, s/p  2 week history of esophageal thrush 4 weeks ago status post PO diflucan treatment. She had recent admission 5/7-5/18/2018 presented for presented for worsening fatigue, generalized weakness, and odynophagia. Pt was found to have severe laryngeal candidiasis confirmed by scope done by ENT. Pt had EGD done 5/15/2018 showed: Mild esophagitis seen in the distal third of the esophagus. Gastritis in the antrum and body of the stomach. Esophageal brushing was neg for malignancy.  Now returning bc pt was seen by Dr. Garner today and was sent in for admission for 10 days of IV antibiotics: micafungin 100mg q24. Pt states that she has been having fevers Tmax 104 for the last 5 days. She also started loosing her voice, which was similar to last time. Pt endorses using steroid inhalers at home.   Of note, pt endorses one episode of palpitations lasting 5-10 minutes on Friday, associated with orange lights flaring in her AICD/PPM machine at home. She denies getting shocked however. This was associated with L sided chest pain, but that resolved once pt calmed herself down. Pt has sob at baseline due to copd, and states it has been getting worse in the last couple of days. (04 Jun 2018 20:08)    FAMILY HISTORY:    PAST MEDICAL & SURGICAL HISTORY:  DVT (deep venous thrombosis)  COPD (chronic obstructive pulmonary disease)  CHF (congestive heart failure)  HTN (hypertension)  RA (rheumatoid arthritis)  Throat cancer  AICD (automatic cardioverter/defibrillator) present  Lupus  History of laryngeal cancer  S/P cholecystectomy  S/P appendectomy        VITALS:  T(F): 97.2, Max: 98.1 (06-04-18 @ 21:50)  HR: 74  BP: 102/54  RR: 17Vital Signs Last 24 Hrs  T(C): 36.2 (05 Jun 2018 06:17), Max: 36.7 (04 Jun 2018 21:50)  T(F): 97.2 (05 Jun 2018 06:17), Max: 98.1 (04 Jun 2018 21:50)  HR: 74 (05 Jun 2018 06:17) (74 - 114)  BP: 102/54 (05 Jun 2018 06:17) (102/54 - 138/76)  BP(mean): --  RR: 17 (05 Jun 2018 06:17) (17 - 20)  SpO2: 96% (04 Jun 2018 21:50) (96% - 99%)    TESTS & MEASUREMENTS:                        13.5   8.30  )-----------( 205      ( 04 Jun 2018 17:59 )             41.6     06-05    139  |  97<L>  |  12  ----------------------------<  105<H>  3.5   |  25  |  0.8    Ca    8.6      05 Jun 2018 03:04    TPro  7.5  /  Alb  4.2  /  TBili  0.3  /  DBili  x   /  AST  27  /  ALT  9   /  AlkPhos  74  06-04    LIVER FUNCTIONS - ( 04 Jun 2018 17:59 )  Alb: 4.2 g/dL / Pro: 7.5 g/dL / ALK PHOS: 74 U/L / ALT: 9 U/L / AST: 27 U/L / GGT: x                   RADIOLOGY & ADDITIONAL TESTS:    ANTIBIOTICS:  micafungin IVPB      micafungin IVPB 100 milliGRAM(s) IV Intermittent every 24 hours  nystatin    Suspension 716469 Unit(s) Oral four times a day
Patient is a 47y old  Female who presents with a chief complaint of worsening laryngeal candidiasis (04 Jun 2018 20:08)      HPI:  46 y/o F with significant PMH of CHF s/p AICD+PPM, DVT on xarelto, COPD, RA, laryngeal cancer s/p excision in november, s/p  2 week history of esophageal thrush 4 weeks ago status post PO diflucan treatment. She had recent admission 5/7-5/18/2018 presented for presented for worsening fatigue, generalized weakness, and odynophagia. Pt was found to have severe laryngeal candidiasis confirmed by scope done by ENT. Pt had EGD done 5/15/2018 showed: Mild esophagitis seen in the distal third of the esophagus. Gastritis in the antrum and body of the stomach. Esophageal brushing was neg for malignancy.  Now returning bc pt was seen by Dr. Garner today and was sent in for admission for 10 days of IV antibiotics: micafungin 100mg q24. Pt states that she has been having fevers Tmax 104 for the last 5 days. She also started loosing her voice, which was similar to last time. Pt endorses using steroid inhalers at home.   Of note, pt endorses one episode of palpitations lasting 5-10 minutes on Friday, associated with orange lights flaring in her AICD/PPM machine at home. She denies getting shocked however. This was associated with L sided chest pain, but that resolved once pt calmed herself down. Pt has sob at baseline due to copd, and states it has been getting worse in the last couple of days. (04 Jun 2018 20:08)  Pulmonary eval requested as patient developed worsening sob and wheezing during hospital stay.      PAST MEDICAL & SURGICAL HISTORY:  DVT (deep venous thrombosis)  COPD (chronic obstructive pulmonary disease)  CHF (congestive heart failure)  HTN (hypertension)  RA (rheumatoid arthritis)  Throat cancer  AICD (automatic cardioverter/defibrillator) present  Lupus  History of laryngeal cancer  S/P cholecystectomy  S/P appendectomy      SOCIAL HX:   Smoking   Active        FAMILY HISTORY:      Allergies    Avelox (Other)  Plaquenil (Other)  Vantin (Other (Mild))    Intolerances          PHYSICAL EXAM  Vital Signs Last 24 Hrs  T(C): 36.3 (18 Jun 2018 05:35), Max: 37.1 (17 Jun 2018 20:33)  T(F): 97.3 (18 Jun 2018 05:35), Max: 98.7 (17 Jun 2018 20:33)  HR: 85 (18 Jun 2018 05:35) (81 - 85)  BP: 124/60 (18 Jun 2018 05:35) (124/60 - 147/76)  RR: 18 (18 Jun 2018 05:35) (18 - 19)  SpO2: --  I&O's Summary      General: Comfortable in bed  HEENT:  On NC  Lymph node: No palpable LN             Lungs: Bilateral wheezing  Cardiovascular: RRR, S1S2  Abdomen: BS+ve; soft non tender  Extremities: No LE edema  Neurological:  AAOx3; No focal deficit      LABS:                          11.3   18.61 )-----------( 240      ( 18 Jun 2018 05:25 )             34.9                                               06-18    141  |  101  |  27<H>  ----------------------------<  115<H>  4.7   |  25  |  1.1    Ca    9.4      18 Jun 2018 05:25  Mg     1.7     06-18          MEDICATIONS  (STANDING):  ALBUTerol/ipratropium for Nebulization 3 milliLiter(s) Nebulizer every 6 hours  ALPRAZolam 2 milliGRAM(s) Oral three times a day  diphenhydrAMINE   Injectable 25 milliGRAM(s) IV Push once  docusate sodium 100 milliGRAM(s) Oral three times a day  fluticasone furoate/vilanterol 100-25 MICROgram(s) Inhaler 1 Puff(s) Inhalation daily  folic acid 1 milliGRAM(s) Oral daily  gabapentin 200 milliGRAM(s) Oral two times a day  hydrOXYzine hydrochloride 25 milliGRAM(s) Oral two times a day  methylPREDNISolone sodium succinate Injectable 40 milliGRAM(s) IV Push every 8 hours  metoclopramide 10 milliGRAM(s) Oral three times a day before meals  metoprolol succinate ER 50 milliGRAM(s) Oral daily  micafungin IVPB 100 milliGRAM(s) IV Intermittent every 24 hours  morphine ER Tablet 200 milliGRAM(s) Oral every 8 hours  nystatin    Suspension 129210 Unit(s) Oral four times a day  pantoprazole    Tablet 40 milliGRAM(s) Oral before breakfast  rivaroxaban 20 milliGRAM(s) Oral every 24 hours  senna 2 Tablet(s) Oral at bedtime  traZODone 100 milliGRAM(s) Oral at bedtime    MEDICATIONS  (PRN):  guaiFENesin/dextromethorphan  Syrup 10 milliLiter(s) Oral every 6 hours PRN Cough  HYDROmorphone   Tablet 8 milliGRAM(s) Oral every 4 hours PRN Severe Pain (7 - 10)  ondansetron Injectable 4 milliGRAM(s) IV Push every 6 hours PRN Nausea and/or Vomiting      Radiology:   < from: Xray Chest 2 Views PA/Lat (06.16.18 @ 11:23) >  Impression:      No radiographic evidence of acute cardiopulmonary disease.    < end of copied text >
Patient is a 47y old  Female who presents with a chief complaint of worsening laryngeal candidiasis (04 Jun 2018 20:08)    HPI:  47 y.o F with PMH of CHF on AICD , A.Fib, COPD, Lupus, DVT on Xarelto, Cervical CA(1996) s/p total hysterectomy (2000), Laryngeal CA (1992) s/p radiation therapy (1992) s/p laryngea mass and lymph node excision (Nov, 2017) s/p Chemo therapy( finished Feb, 2018) had recent admission 5/7/18 Dx with Laryngeal Candidiasis Pt. was seen by Dr. Hanna 6/1/18 s/p FFL noted that Layngeal candidiasis improved but not completely treated with PO Diflucan adn swish and Swallow Nystatin was sent for admission for treatment with IV Antifungal. Pt. states she had intermittent chills, fevers T 104, + cough Pt. states that voice with hoarseness getting was there since the surgery but getting worse especially when she talks too much. Pt. denies otalgia, d/c, N/V, dysphagia.         PAST MEDICAL & SURGICAL HISTORY:  DVT (deep venous thrombosis)  COPD (chronic obstructive pulmonary disease)  CHF (congestive heart failure)  HTN (hypertension)  RA (rheumatoid arthritis)  Cervical CA s/p Total hysterectomy (2000)  Throat cancer  AICD (automatic cardioverter/defibrillator) present  Lupus  History of laryngeal cancer  S/P cholecystectomy  S/P appendectomy      MEDICATIONS  (STANDING):  ALBUTerol/ipratropium for Nebulization 3 milliLiter(s) Nebulizer every 4 hours  amphotericin B  liposome  IVPB 230 milliGRAM(s) IV Intermittent daily  folic acid 1 milliGRAM(s) Oral daily  gabapentin 300 milliGRAM(s) Oral two times a day  hydrALAZINE 50 milliGRAM(s) Oral every 8 hours  hydrOXYzine hydrochloride 50 milliGRAM(s) Oral two times a day  metoclopramide 10 milliGRAM(s) Oral three times a day before meals  metoprolol succinate ER 50 milliGRAM(s) Oral daily  morphine ER Tablet 200 milliGRAM(s) Oral three times a day  nystatin    Suspension 332280 Unit(s) Oral four times a day  pantoprazole    Tablet 40 milliGRAM(s) Oral before breakfast  rivaroxaban 20 milliGRAM(s) Oral every 24 hours  traZODone 100 milliGRAM(s) Oral at bedtime    MEDICATIONS  (PRN):  ALPRAZolam 1 milliGRAM(s) Oral <User Schedule> PRN anxiety  HYDROmorphone   Tablet 8 milliGRAM(s) Oral every 4 hours PRN Severe Pain (7 - 10)    Allergies: Avelox, Plaquenil, Vantin.      REVIEW OF SYSTEMS:  as per HPI                          12.5   7.66  )-----------( 181      ( 05 Jun 2018 06:50 )             38.9     06-05    139  |  97<L>  |  12  ----------------------------<  105<H>  3.5   |  25  |  0.8    Ca    8.6      05 Jun 2018 03:04    TPro  7.5  /  Alb  4.2  /  TBili  0.3  /  DBili  x   /  AST  27  /  ALT  9   /  AlkPhos  74  06-04    Vital Signs Last 24 Hrs  T(C): 36.2 (05 Jun 2018 06:17), Max: 36.7 (04 Jun 2018 21:50)  T(F): 97.2 (05 Jun 2018 06:17), Max: 98.1 (04 Jun 2018 21:50)  HR: 74 (05 Jun 2018 06:17) (74 - 114)  BP: 102/54 (05 Jun 2018 06:17) (102/54 - 138/76)  RR: 17 (05 Jun 2018 06:17) (17 - 20)  SpO2: 96% (04 Jun 2018 21:50) (96% - 99%)      PHYSICAL EXAM:  Constitutional Normal: well nourished, well developed, non-dysmorphic, no acute distress  Psychiatric: age appropriate behavior, cooperative  Skin: no obvious skin lesions  Lymphatic: no cervical lymphadenopathy  Head AT/ NC  Eyes: PERRLA, EOMI  Left EAC: Normal, No cerumen, no exostoses   Right EAC: Normal, No cerumen, no exostoses   Left Tympanic Membrane: Normal, Clear, No effusion  Right Tympanic Membrane: Normal, Clear, No effusion			  Nose:  Normal, no structural deformities, no d/c  Oral Cavity:  uvula midline, tongue midline, occasional small white spots at the posterior pharynx. Pt. refused to take upper denture off.    Neck: No palpable lymphadenopathy, mild tenderness to palpation over anterior left neck , 2.5 cm well healed scar.  Pulmonary: No Acute Distress.   Neurologic: awake and alert  x 3
Patient is a 47y old  Female who presents with a chief complaint of worsening laryngeal candidiasis (04 Jun 2018 20:08)  severe dyspnea, loudly wheezing, even audible without stethoscope, as the result receiving Steroid IV, (still smoking), and possibly due to medicine BP has been very high, cardiology consult was called. no cardiac complaint    HPI:  46 y/o F with significant PMH of CHF s/p AICD+PPM, DVT on xarelto, COPD, RA, laryngeal cancer s/p excision in november, s/p  2 week history of esophageal thrush 4 weeks ago status post PO diflucan treatment. She had recent admission 5/7-5/18/2018 presented for presented for worsening fatigue, generalized weakness, and odynophagia. Pt was found to have severe laryngeal candidiasis confirmed by scope done by ENT. Pt had EGD done 5/15/2018 showed: Mild esophagitis seen in the distal third of the esophagus. Gastritis in the antrum and body of the stomach. Esophageal brushing was neg for malignancy.  Now returning bc pt was seen by Dr. Garner today and was sent in for admission for 10 days of IV antibiotics: micafungin 100mg q24. Pt states that she has been having fevers Tmax 104 for the last 5 days. She also started loosing her voice, which was similar to last time. Pt endorses using steroid inhalers at home.   Of note, pt endorses one episode of palpitations lasting 5-10 minutes on Friday, associated with orange lights flaring in her AICD/PPM machine at home. She denies getting shocked however. This was associated with L sided chest pain, but that resolved once pt calmed herself down. Pt has sob at baseline due to copd, and states it has been getting worse in the last couple of days. (04 Jun 2018 20:08)      PAST MEDICAL & SURGICAL HISTORY:  DVT (deep venous thrombosis)  COPD (chronic obstructive pulmonary disease)  CHF (congestive heart failure)  HTN (hypertension)  RA (rheumatoid arthritis)  Throat cancer  AICD (automatic cardioverter/defibrillator) present  Lupus  History of laryngeal cancer  S/P cholecystectomy  S/P appendectomy      PREVIOUS DIAGNOSTIC TESTING:      ECHO  FINDINGS:    STRESS TEST  FINDINGS:    CATHETERIZATION  FINDINGS:    MEDICATIONS  (STANDING):  ALBUTerol/ipratropium for Nebulization 3 milliLiter(s) Nebulizer every 6 hours  ALPRAZolam 2 milliGRAM(s) Oral three times a day  azithromycin   Tablet 250 milliGRAM(s) Oral daily  bismuth subsalicylate Liquid 30 milliLiter(s) Oral daily  diphenhydrAMINE   Injectable 25 milliGRAM(s) IV Push once  docusate sodium 100 milliGRAM(s) Oral three times a day  fluticasone furoate/vilanterol 100-25 MICROgram(s) Inhaler 1 Puff(s) Inhalation daily  folic acid 1 milliGRAM(s) Oral daily  gabapentin 200 milliGRAM(s) Oral two times a day  hydrOXYzine hydrochloride 25 milliGRAM(s) Oral two times a day  methylPREDNISolone sodium succinate Injectable 60 milliGRAM(s) IV Push three times a day  metoclopramide 10 milliGRAM(s) Oral three times a day before meals  metoprolol succinate ER 50 milliGRAM(s) Oral daily  micafungin IVPB 100 milliGRAM(s) IV Intermittent every 24 hours  morphine ER Tablet 200 milliGRAM(s) Oral every 8 hours  nystatin    Suspension [id number] Unit(s) Oral four times a day  pantoprazole    Tablet 40 milliGRAM(s) Oral before breakfast  rivaroxaban 20 milliGRAM(s) Oral every 24 hours  senna 2 Tablet(s) Oral at bedtime  traZODone 100 milliGRAM(s) Oral at bedtime    MEDICATIONS  (PRN):  guaiFENesin/dextromethorphan  Syrup 10 milliLiter(s) Oral every 6 hours PRN Cough  HYDROmorphone   Tablet 8 milliGRAM(s) Oral every 4 hours PRN Severe Pain (7 - 10)  ondansetron Injectable 4 milliGRAM(s) IV Push every 6 hours PRN Nausea and/or Vomiting      FAMILY HISTORY:      SOCIAL HISTORY:  CIGARETTES:  ALCOHOL:  DRUGS:                      REVIEW OF SYSTEMS:  CONSTITUTIONAL: distress,   NECK: No pain or stiffnes  RESPIRATORY: cough, wheezing, shortness of breath  CARDIOVASCULAR: No chest pain, palpitations, leg swelling  GASTROINTESTINAL: No abdominal or epigastric pain. No nausea, vomiting, or hematemesis;  No melena.  NEUROLOGICAL: No dizziness, headaches, memory loss, loss of strength  SKIN: No itching, burning, rashes, or lesions   ENDOCRINE: No heat or cold intolerance  MUSCULOSKELETAL: No joint pain, No  swelling; No muscle pain  PSYCHIATRIC: No depression, anxiety, mood swings, or difficulty sleeping  ALLERGY: No hives, itching, rash          Vital Signs Last 24 Hrs  T(C): 36.4 (20 Jun 2018 05:42), Max: 36.4 (19 Jun 2018 14:06)  T(F): 97.5 (20 Jun 2018 05:42), Max: 97.6 (19 Jun 2018 14:06)  HR: 70 (20 Jun 2018 05:42) (70 - 84)  BP: 182/93 (20 Jun 2018 05:42) (166/71 - 182/93)  BP(mean): --  RR: 18 (20 Jun 2018 05:42) (18 - 18)  SpO2: 99% (19 Jun 2018 19:54) (99% - 99%)                      PHYSICAL EXAM:  GENERAL: No distress, well developed  HEAD:  Atraumatic, Normocephalic  NECK: Supple, No JVD, No Bruit of either carotid arteries  NERVOUS SYSTEM:  Alert, Awake, Oriented to time, place, person; Normal memory and speech; Normal motor Strength 5/5 B/L upper and lower extremities  CHEST/LUNG: extensive wheeze, rhonchi, no crackle, rales, rhonchi  HEART: Regular heart beat, S1, A2, P2, No S3, No S4, No gallop, No murmur  ABDOMEN: Soft, Non tender, Non distended; Bowel sounds present  EXTREMITIES:  2+ Peripheral Pulses, No clubbing, No edema  SKIN: No rashes or lesions    TELEMETRY:    ECG:< from: 12 Lead ECG (06.19.18 @ 21:53) >  Normal sinus rhythm  Poor R wave progression    < end of copied text >      I&O's Detail      LABS:                        12.9   19.67 )-----------( 308      ( 20 Jun 2018 06:24 )             40.2     06-20    139  |  97<L>  |  29<H>  ----------------------------<  120<H>  4.0   |  26  |  1.1    Ca    9.6      20 Jun 2018 06:24              I&O's Summary      RADIOLOGY & ADDITIONAL STUDIES: < from: Xray Chest 1 View AP/PA (06.18.18 @ 14:09) >    No radiographic evidence of cardiopulmonary disease.    < end of copied text >

## 2018-06-21 LAB
ANION GAP SERPL CALC-SCNC: 16 MMOL/L — HIGH (ref 7–14)
BUN SERPL-MCNC: 21 MG/DL — HIGH (ref 10–20)
CALCIUM SERPL-MCNC: 9.1 MG/DL — SIGNIFICANT CHANGE UP (ref 8.5–10.1)
CHLORIDE SERPL-SCNC: 92 MMOL/L — LOW (ref 98–110)
CO2 SERPL-SCNC: 26 MMOL/L — SIGNIFICANT CHANGE UP (ref 17–32)
CREAT SERPL-MCNC: 1.1 MG/DL — SIGNIFICANT CHANGE UP (ref 0.7–1.5)
GLUCOSE SERPL-MCNC: 137 MG/DL — HIGH (ref 70–99)
HCT VFR BLD CALC: 41.5 % — SIGNIFICANT CHANGE UP (ref 37–47)
HGB BLD-MCNC: 13.4 G/DL — SIGNIFICANT CHANGE UP (ref 12–16)
MCHC RBC-ENTMCNC: 27.4 PG — SIGNIFICANT CHANGE UP (ref 27–31)
MCHC RBC-ENTMCNC: 32.3 G/DL — SIGNIFICANT CHANGE UP (ref 32–37)
MCV RBC AUTO: 84.9 FL — SIGNIFICANT CHANGE UP (ref 81–99)
NRBC # BLD: 0 /100 WBCS — SIGNIFICANT CHANGE UP (ref 0–0)
PLATELET # BLD AUTO: 272 K/UL — SIGNIFICANT CHANGE UP (ref 130–400)
POTASSIUM SERPL-MCNC: 3.7 MMOL/L — SIGNIFICANT CHANGE UP (ref 3.5–5)
POTASSIUM SERPL-SCNC: 3.7 MMOL/L — SIGNIFICANT CHANGE UP (ref 3.5–5)
RBC # BLD: 4.89 M/UL — SIGNIFICANT CHANGE UP (ref 4.2–5.4)
RBC # FLD: 13.3 % — SIGNIFICANT CHANGE UP (ref 11.5–14.5)
SODIUM SERPL-SCNC: 134 MMOL/L — LOW (ref 135–146)
WBC # BLD: 16.6 K/UL — HIGH (ref 4.8–10.8)
WBC # FLD AUTO: 16.6 K/UL — HIGH (ref 4.8–10.8)

## 2018-06-21 RX ORDER — ACETAMINOPHEN 500 MG
650 TABLET ORAL EVERY 4 HOURS
Qty: 0 | Refills: 0 | Status: DISCONTINUED | OUTPATIENT
Start: 2018-06-21 | End: 2018-06-26

## 2018-06-21 RX ORDER — HYDRALAZINE HCL 50 MG
50 TABLET ORAL EVERY 12 HOURS
Qty: 0 | Refills: 0 | Status: DISCONTINUED | OUTPATIENT
Start: 2018-06-21 | End: 2018-06-26

## 2018-06-21 RX ORDER — NIFEDIPINE 30 MG
30 TABLET, EXTENDED RELEASE 24 HR ORAL DAILY
Qty: 0 | Refills: 0 | Status: DISCONTINUED | OUTPATIENT
Start: 2018-06-21 | End: 2018-06-21

## 2018-06-21 RX ORDER — IBUPROFEN 200 MG
400 TABLET ORAL ONCE
Qty: 0 | Refills: 0 | Status: COMPLETED | OUTPATIENT
Start: 2018-06-21 | End: 2018-06-21

## 2018-06-21 RX ORDER — NIFEDIPINE 30 MG
30 TABLET, EXTENDED RELEASE 24 HR ORAL
Qty: 0 | Refills: 0 | Status: DISCONTINUED | OUTPATIENT
Start: 2018-06-21 | End: 2018-06-26

## 2018-06-21 RX ORDER — LACTOBACILLUS ACIDOPHILUS 100MM CELL
1 CAPSULE ORAL DAILY
Qty: 0 | Refills: 0 | Status: DISCONTINUED | OUTPATIENT
Start: 2018-06-21 | End: 2018-06-26

## 2018-06-21 RX ADMIN — Medication 10 MILLIGRAM(S): at 17:43

## 2018-06-21 RX ADMIN — Medication 500000 UNIT(S): at 05:54

## 2018-06-21 RX ADMIN — HYDROMORPHONE HYDROCHLORIDE 8 MILLIGRAM(S): 2 INJECTION INTRAMUSCULAR; INTRAVENOUS; SUBCUTANEOUS at 11:51

## 2018-06-21 RX ADMIN — Medication 3 MILLILITER(S): at 07:16

## 2018-06-21 RX ADMIN — MORPHINE SULFATE 200 MILLIGRAM(S): 50 CAPSULE, EXTENDED RELEASE ORAL at 05:53

## 2018-06-21 RX ADMIN — MORPHINE SULFATE 200 MILLIGRAM(S): 50 CAPSULE, EXTENDED RELEASE ORAL at 13:40

## 2018-06-21 RX ADMIN — Medication 500000 UNIT(S): at 11:12

## 2018-06-21 RX ADMIN — Medication 650 MILLIGRAM(S): at 13:53

## 2018-06-21 RX ADMIN — Medication 50 MILLIGRAM(S): at 17:46

## 2018-06-21 RX ADMIN — Medication 100 MILLIGRAM(S): at 21:39

## 2018-06-21 RX ADMIN — RIVAROXABAN 20 MILLIGRAM(S): KIT at 17:45

## 2018-06-21 RX ADMIN — Medication 100 MILLIGRAM(S): at 21:42

## 2018-06-21 RX ADMIN — Medication 400 MILLIGRAM(S): at 15:32

## 2018-06-21 RX ADMIN — Medication 2 MILLIGRAM(S): at 05:53

## 2018-06-21 RX ADMIN — Medication 100 MILLIGRAM(S): at 18:23

## 2018-06-21 RX ADMIN — Medication 10 MILLIGRAM(S): at 05:54

## 2018-06-21 RX ADMIN — MICAFUNGIN SODIUM 105 MILLIGRAM(S): 100 INJECTION, POWDER, LYOPHILIZED, FOR SOLUTION INTRAVENOUS at 17:50

## 2018-06-21 RX ADMIN — Medication 2 MILLIGRAM(S): at 13:10

## 2018-06-21 RX ADMIN — HYDROMORPHONE HYDROCHLORIDE 8 MILLIGRAM(S): 2 INJECTION INTRAMUSCULAR; INTRAVENOUS; SUBCUTANEOUS at 11:21

## 2018-06-21 RX ADMIN — MORPHINE SULFATE 200 MILLIGRAM(S): 50 CAPSULE, EXTENDED RELEASE ORAL at 13:10

## 2018-06-21 RX ADMIN — AZITHROMYCIN 250 MILLIGRAM(S): 500 TABLET, FILM COATED ORAL at 11:11

## 2018-06-21 RX ADMIN — Medication 25 MILLIGRAM(S): at 05:57

## 2018-06-21 RX ADMIN — Medication 650 MILLIGRAM(S): at 18:22

## 2018-06-21 RX ADMIN — Medication 650 MILLIGRAM(S): at 09:45

## 2018-06-21 RX ADMIN — Medication 500000 UNIT(S): at 17:43

## 2018-06-21 RX ADMIN — Medication 10 MILLIGRAM(S): at 11:11

## 2018-06-21 RX ADMIN — Medication 1 MILLIGRAM(S): at 11:12

## 2018-06-21 RX ADMIN — Medication 3 MILLILITER(S): at 13:27

## 2018-06-21 RX ADMIN — MORPHINE SULFATE 200 MILLIGRAM(S): 50 CAPSULE, EXTENDED RELEASE ORAL at 21:38

## 2018-06-21 RX ADMIN — Medication 25 MILLIGRAM(S): at 05:58

## 2018-06-21 RX ADMIN — Medication 25 MILLIGRAM(S): at 05:54

## 2018-06-21 RX ADMIN — Medication 2 MILLIGRAM(S): at 21:38

## 2018-06-21 RX ADMIN — FLUTICASONE FUROATE AND VILANTEROL TRIFENATATE 1 PUFF(S): 100; 25 POWDER RESPIRATORY (INHALATION) at 09:50

## 2018-06-21 RX ADMIN — Medication 400 MILLIGRAM(S): at 16:00

## 2018-06-21 RX ADMIN — GABAPENTIN 200 MILLIGRAM(S): 400 CAPSULE ORAL at 17:44

## 2018-06-21 RX ADMIN — Medication 30 MILLIGRAM(S): at 13:08

## 2018-06-21 RX ADMIN — Medication 25 MILLIGRAM(S): at 17:46

## 2018-06-21 RX ADMIN — PANTOPRAZOLE SODIUM 40 MILLIGRAM(S): 20 TABLET, DELAYED RELEASE ORAL at 05:55

## 2018-06-21 RX ADMIN — GABAPENTIN 200 MILLIGRAM(S): 400 CAPSULE ORAL at 05:54

## 2018-06-21 NOTE — PROVIDER CONTACT NOTE (OTHER) - REASON
Patients RUE IV red and swollen. Patient refusing new IV lock at this time despite not receiving entire dose of clindamycin.

## 2018-06-21 NOTE — CHART NOTE - NSCHARTNOTEFT_GEN_A_CORE
Registered Dietitian Follow-Up (limited)    Pt. reports excellent appetite, denies GI distress or chew/swallow difficulty at this time. Last BM 6/20. Labs/meds reviewed. No RD intervention at this time. Will continue to monitor pt.

## 2018-06-21 NOTE — PROGRESS NOTE ADULT - SUBJECTIVE AND OBJECTIVE BOX
Subjective:  BP still is elevated, feels better than yesterday, Hydralazine was not enough, K 4.0    MEDICATIONS  (STANDING):  ALBUTerol/ipratropium for Nebulization 3 milliLiter(s) Nebulizer every 6 hours  ALPRAZolam 2 milliGRAM(s) Oral three times a day  azithromycin   Tablet 250 milliGRAM(s) Oral daily  bismuth subsalicylate Liquid 30 milliLiter(s) Oral daily  diphenhydrAMINE   Injectable 25 milliGRAM(s) IV Push once  docusate sodium 100 milliGRAM(s) Oral three times a day  fluticasone furoate/vilanterol 100-25 MICROgram(s) Inhaler 1 Puff(s) Inhalation daily  folic acid 1 milliGRAM(s) Oral daily  gabapentin 200 milliGRAM(s) Oral two times a day  hydrALAZINE 50 milliGRAM(s) Oral every 12 hours  hydrOXYzine hydrochloride 25 milliGRAM(s) Oral two times a day  methylPREDNISolone sodium succinate Injectable 40 milliGRAM(s) IV Push daily  metoclopramide 10 milliGRAM(s) Oral three times a day before meals  metoprolol succinate ER 25 milliGRAM(s) Oral daily  micafungin IVPB 100 milliGRAM(s) IV Intermittent every 24 hours  morphine ER Tablet 200 milliGRAM(s) Oral every 8 hours  NIFEdipine XL 30 milliGRAM(s) Oral daily  nystatin    Suspension 292132 Unit(s) Oral four times a day  pantoprazole    Tablet 40 milliGRAM(s) Oral before breakfast  rivaroxaban 20 milliGRAM(s) Oral every 24 hours  senna 2 Tablet(s) Oral at bedtime  traZODone 100 milliGRAM(s) Oral at bedtime    MEDICATIONS  (PRN):  acetaminophen   Tablet 650 milliGRAM(s) Oral every 4 hours PRN For Temp greater than 38 C (100.4 F)  guaiFENesin/dextromethorphan  Syrup 10 milliLiter(s) Oral every 6 hours PRN Cough  HYDROmorphone   Tablet 8 milliGRAM(s) Oral every 4 hours PRN Severe Pain (7 - 10)  ondansetron Injectable 4 milliGRAM(s) IV Push every 6 hours PRN Nausea and/or Vomiting            Vital Signs Last 24 Hrs  T(C): 37.5 (21 Jun 2018 11:15), Max: 38 (21 Jun 2018 09:07)  T(F): 99.5 (21 Jun 2018 11:15), Max: 100.4 (21 Jun 2018 09:07)  HR: 88 (21 Jun 2018 06:57) (78 - 95)  BP: 171/88 (21 Jun 2018 06:57) (157/80 - 177/102)  BP(mean): --  RR: 19 (21 Jun 2018 06:02) (19 - 20)  SpO2: 98% (21 Jun 2018 10:54) (97% - 98%)             REVIEW OF SYSTEMS:  numerous complaint: fatigue, sob, hoarsness of the voice, stress, malaise  no chest pain, but sob  no palpitation, but fatigue  no leg edema             PHYSICAL EXAM:  · CONSTITUTIONAL: Looks stable compare to her general condition  . NECK: Supple, no JVD, no bruit on either carotid side   · RESPIRATORY: Normal air entry to lung base, Rhonchi, coarse crackle, no wet rales  · CARDIOVASCULAR: Normal S1, A2, P2, no gallop  · EXTREMITIES: No cyanosis, no clubbing, no edema  · VASCULAR: Pulses are regular, equal, bilateral in upper and lower extremities  	    LABS:                        12.9   19.67 )-----------( 308      ( 20 Jun 2018 06:24 )             40.2     06-20    139  |  97<L>  |  29<H>  ----------------------------<  120<H>  4.0   |  26  |  1.1    Ca    9.6      20 Jun 2018 06:24              I&O's Summary    BNP  RADIOLOGY & ADDITIONAL STUDIES:    IMPRESSION AND PLAN:

## 2018-06-21 NOTE — PROGRESS NOTE ADULT - SUBJECTIVE AND OBJECTIVE BOX
ILEANA MCNEIL  47y  Female      Patient is a 47y old  Female who presents with a chief complaint of worsening laryngeal candidiasis (04 Jun 2018 20:08)      INTERVAL HPI/OVERNIGHT EVENTS: patient now c/o chills and worsening LUE pain/redness      REVIEW OF SYSTEMS:  as above  All other review of systems negative    T(C): 38.2 (06-21-18 @ 16:00), Max: 38.6 (06-21-18 @ 15:32)  HR: 91 (06-21-18 @ 13:21) (88 - 95)  BP: 138/63 (06-21-18 @ 13:21) (138/63 - 177/102)  RR: 20 (06-21-18 @ 13:21) (19 - 20)  SpO2: 98% (06-21-18 @ 10:54) (97% - 98%)  Wt(kg): --Vital Signs Last 24 Hrs  T(C): 38.2 (21 Jun 2018 16:00), Max: 38.6 (21 Jun 2018 15:32)  T(F): 100.7 (21 Jun 2018 16:00), Max: 101.5 (21 Jun 2018 15:32)  HR: 91 (21 Jun 2018 13:21) (88 - 95)  BP: 138/63 (21 Jun 2018 13:21) (138/63 - 177/102)  BP(mean): --  RR: 20 (21 Jun 2018 13:21) (19 - 20)  SpO2: 98% (21 Jun 2018 10:54) (97% - 98%)        PHYSICAL EXAM:  GENERAL: NAD  PSYCH: no agitation, baseline mentation  HEENT hoarse  NERVOUS SYSTEM:  Alert & Oriented X3, no new focal deficits  PULMONARY: mildly improved coarse expiratory wheezing  CARDIOVASCULAR: Regular rate and rhythm; No murmurs, rubs, or gallops  GI: Soft, Nontender, Nondistended; Bowel sounds present  EXTREMITIES:  2+ Peripheral Pulses, No clubbing, cyanosis, or edema  SKIN: L forearm a bit more red, TTP    Consultant(s) Notes Reviewed:  [x ] YES  [ ] NO    Discussed with Consultants/Other Providers [ x] YES     LABS                          13.4   16.60 )-----------( 272      ( 21 Jun 2018 12:13 )             41.5     06-21    134<L>  |  92<L>  |  21<H>  ----------------------------<  137<H>  3.7   |  26  |  1.1    Ca    9.1      21 Jun 2018 12:13            Lactate Trend        CAPILLARY BLOOD GLUCOSE            RADIOLOGY & ADDITIONAL TESTS:    Imaging Personally Reviewed:  [ ] YES  [ ] NO    HEALTH ISSUES - PROBLEM Dx:

## 2018-06-21 NOTE — PROGRESS NOTE ADULT - ASSESSMENT
ILEANA MCNEIL 47y Female  MRN#: [id number]   CODE STATUS:________      SUBJECTIVE  Patient is a 47y old Female who presents with a chief complaint of worsening laryngeal candidiasis (04 Jun 2018 20:08)  Currently admitted to medicine with the primary diagnosis of Candida esophagitis  Hospital course has been complicated by COPD exacerbation, phlebitis, and possible cellulitis.   Today is hospital day 17d, and this morning she is complaining of subjective fever, chills, and increased left arm pain.    Present Today:           Rodarte Catheter (X)No/ ()Yes? Indication:          Central Line (X)No/ ()Yes? Indication:          IV Fluids (X)No/ ()Yes? Type:  Rate:  Indication:      OBJECTIVE  PAST MEDICAL & SURGICAL HISTORY  DVT (deep venous thrombosis)  COPD (chronic obstructive pulmonary disease)  CHF (congestive heart failure)  HTN (hypertension)  RA (rheumatoid arthritis)  Throat cancer  AICD (automatic cardioverter/defibrillator) present  Lupus  History of laryngeal cancer  S/P cholecystectomy  S/P appendectomy    ALLERGIES:  Avelox (Other)  Plaquenil (Other)  Vantin (Other (Mild))    MEDICATIONS:  STANDING MEDICATIONS  ALBUTerol/ipratropium for Nebulization 3 milliLiter(s) Nebulizer every 6 hours  ALPRAZolam 2 milliGRAM(s) Oral three times a day  bismuth subsalicylate Liquid 30 milliLiter(s) Oral daily  clindamycin IVPB      clindamycin IVPB 600 milliGRAM(s) IV Intermittent every 8 hours  diphenhydrAMINE   Injectable 25 milliGRAM(s) IV Push once  docusate sodium 100 milliGRAM(s) Oral three times a day  fluticasone furoate/vilanterol 100-25 MICROgram(s) Inhaler 1 Puff(s) Inhalation daily  folic acid 1 milliGRAM(s) Oral daily  gabapentin 200 milliGRAM(s) Oral two times a day  hydrALAZINE 50 milliGRAM(s) Oral every 12 hours  hydrOXYzine hydrochloride 25 milliGRAM(s) Oral two times a day  lactobacillus acidophilus 1 Tablet(s) Oral daily  metoclopramide 10 milliGRAM(s) Oral three times a day before meals  metoprolol succinate ER 25 milliGRAM(s) Oral daily  micafungin IVPB 100 milliGRAM(s) IV Intermittent every 24 hours  morphine ER Tablet 200 milliGRAM(s) Oral every 8 hours  NIFEdipine XL 30 milliGRAM(s) Oral <User Schedule>  nystatin    Suspension 512440 Unit(s) Oral four times a day  pantoprazole    Tablet 40 milliGRAM(s) Oral before breakfast  predniSONE   Tablet 40 milliGRAM(s) Oral daily  rivaroxaban 20 milliGRAM(s) Oral every 24 hours  senna 2 Tablet(s) Oral at bedtime  traZODone 100 milliGRAM(s) Oral at bedtime    PRN MEDICATIONS  acetaminophen   Tablet 650 milliGRAM(s) Oral every 4 hours PRN  guaiFENesin/dextromethorphan  Syrup 10 milliLiter(s) Oral every 6 hours PRN  HYDROmorphone   Tablet 8 milliGRAM(s) Oral every 4 hours PRN  ondansetron Injectable 4 milliGRAM(s) IV Push every 6 hours PRN      VITAL SIGNS: Last 24 Hours  T(C): 37.9 (21 Jun 2018 17:30), Max: 38.6 (21 Jun 2018 15:32)  T(F): 100.2 (21 Jun 2018 17:30), Max: 101.5 (21 Jun 2018 15:32)  HR: 87 (21 Jun 2018 17:30) (87 - 95)  BP: 140/64 (21 Jun 2018 17:30) (138/63 - 177/102)  BP(mean): --  RR: 20 (21 Jun 2018 13:21) (19 - 20)  SpO2: 98% (21 Jun 2018 10:54) (98% - 98%)    LABS:             13.4   16.60 )-----------( 272      ( 21 Jun 2018 12:13 )             41.5     06-21    134<L>  |  92<L>  |  21<H>  ----------------------------<  137<H>  3.7   |  26  |  1.1    Ca    9.1      21 Jun 2018 12:13      Culture - Sputum (collected 19 Jun 2018 23:00)  Source: .Sputum Sputum  Gram Stain (20 Jun 2018 14:50):    Moderate polymorphonuclear leukocytes seen per low power field    Few Squamous epithelial cells seen per low power field    Numerous Gram Positive Cocci seen per oil power field    Moderate Gram Negative Rods seen per oil power field    Preliminary Report (21 Jun 2018 10:27):    Numerous Klebsiella pneumoniae    Normal Respiratory Dariana absent    RADIOLOGY: No new imaging studies to report.      PHYSICAL EXAM:    GENERAL: NAD, well-developed, AAOx3, visible shaking.  HEENT:  Atraumatic, Normocephalic. EOMI, PERRLA, conjunctiva and sclera clear.  PULMONARY: Equal expansion, Course lung shounds BL.  CARDIOVASCULAR: Regular rate and rhythm; No murmurs, rubs, or gallops appreciated.  GASTROINTESTINAL: Soft, Nontender, Nondistended; Bowel sounds present  MUSCULOSKELETAL: 1+ edema in lower extremities below the knee BL.  SKIN: Left medial arm warm, non-induration, erythematous with no sign of lymphangitis and is painful to touch.       ADMISSION SUMMARY  46 y/o F with significant PMH of Lupus, CHF s/p AICD+PPM, DVT on xarelto, COPD, RA, laryngeal cancer s/p excision in november, s/p 2 week history of esophageal thrush 4 weeks ago status here with laryngeal candidiasis (04 Jun 2018 20:08) which failed outpatient PO antifungal treatment. Course complicated by acute renal failure, which has since improved. Later had laryngeal edema identified treated empirically with IV steroid which improved. Course later complicated by acute exacerbation of underlying COPD which is now improving. Now course complicated by sepsis secondary to LUE cellulitis and adjacent superficial thrombophlebitis.    1. Sepsis 2/2 LUE cellulitis vs. pneumonia - Left upper extremity erythema unchanged from yesterday is warm/painful to touch and not indurated at this time, WBC 16, numerous kblebsiella pneumonia in preliminary sputum culture; CXR unremarkable on 06/18/2018.   ·	Warm compress to affected area on left arm; elevation encouraged.  ·	Covering with clindamycin, as vantin and avelox allergies have been documented.  ·	Pending ID recommendations.    2. COPD exacerbation - Continues to have course breath sounds with improved wheezing BL; able to ambulate without difficulty.  ·	Managing with Prednisone 40mg QD PO, Duonebs Q4H + PRN, BREO (home prescription)  ·	Peak flow today was 200; will check again tomorrow.  ·	Advised on smoking cessation.     3. Laryngeal Candidiasis, s/p EGD (05/15/2018) - Improving  ·	IV micafungin continued; Pending ID recommendation for antifungal therapy upon discharge.    4. N/V and abdominal pain (resolved) - Gastroenteritis viral vs. bacteria vs. micafungin side effect  ·	 Pt reports normal BM.  ·	 Zofran PRN    5. TIAN on CKD likely 2/2 prerenal causes (resolved)  ·	 Amphotericin was discontinued  ·	 Monitoring BMP daily  ·	 Prior urine studies showed FeNa 1.3, suggestive of intrinsic renal injury    6. Throat Cancer s/p excision (Nov 2017) - as per pt she was put on CellCept for her LUPUS 2 years ago by her Rheumatologist  and stopped taking it in Feb2018. She said she is not on any other chemotherapy.   ·	Heme/onc consult rec: f/u with her doctor outpatient; pt was seen by Heme/Onc () on last admission.    7. CHF s/p AICD + PPM:   ·	echo done 6/7 showed LVEF 65%     8. Acute DVT  ·	On Xarelto.    9. HTN (stable)  ·	 Toprol,   ·	 Hydralazine    10. RA (controlled)  ·	Outpt benlysta + methotrexate shots  ·	Folic acid supplementation    11. Chronic Pain 2/2 Multiple Back Sx  ·	 MS contin   ·	 Dilaudid PRN  ·	 Gabapentin    12. Anxiety  ·	 Xanax PRN per home dosing  ·	 trazodone    13. Hyperkalemia (resolved)    DVT PPX: back on home NOAC.  GI PPx: protonix.  Dispo: Home after medically stable.    Present today:  (Y) Congestive Heart Failure, Yes? ( )Acute / ( )Acute on Chronic / (X)Chronic  :  ( )Systolic / (X)Diastolic               Plan: Follow up with cardiologist for further management as outpatient.  (?) Complicated Pneumonia, Type?  ( )Parapneumonic effusion / ( )Abscess / ( ) Multilobar / ( )Other               Plan: Suspected, will consider CXR if patient does not improve.    (X) Discussion with patient and/or family regarding goals of care  ( ) Discussed Case and Plan with Medical Attending, Name:    Planned Disposition: Discharge home when medical condition is optimized. ILEANA MCNEIL 47y Female  MRN#: [id number]   CODE STATUS:________      SUBJECTIVE  Patient is a 47y old Female who presents with a chief complaint of worsening laryngeal candidiasis (04 Jun 2018 20:08)  Currently admitted to medicine with the primary diagnosis of Candida esophagitis  Hospital course has been complicated by COPD exacerbation, phlebitis, and possible cellulitis.   Today is hospital day 17d, and this morning she is complaining of subjective fever, chills, and increased left arm pain.    Present Today:           Rodarte Catheter (X)No/ ()Yes? Indication:          Central Line (X)No/ ()Yes? Indication:          IV Fluids (X)No/ ()Yes? Type:  Rate:  Indication:      OBJECTIVE  PAST MEDICAL & SURGICAL HISTORY  DVT (deep venous thrombosis)  COPD (chronic obstructive pulmonary disease)  CHF (congestive heart failure)  HTN (hypertension)  RA (rheumatoid arthritis)  Throat cancer  AICD (automatic cardioverter/defibrillator) present  Lupus  History of laryngeal cancer  S/P cholecystectomy  S/P appendectomy    ALLERGIES:  Avelox (Other)  Plaquenil (Other)  Vantin (Other (Mild))    MEDICATIONS:  STANDING MEDICATIONS  ALBUTerol/ipratropium for Nebulization 3 milliLiter(s) Nebulizer every 6 hours  ALPRAZolam 2 milliGRAM(s) Oral three times a day  bismuth subsalicylate Liquid 30 milliLiter(s) Oral daily  clindamycin IVPB      clindamycin IVPB 600 milliGRAM(s) IV Intermittent every 8 hours  diphenhydrAMINE   Injectable 25 milliGRAM(s) IV Push once  docusate sodium 100 milliGRAM(s) Oral three times a day  fluticasone furoate/vilanterol 100-25 MICROgram(s) Inhaler 1 Puff(s) Inhalation daily  folic acid 1 milliGRAM(s) Oral daily  gabapentin 200 milliGRAM(s) Oral two times a day  hydrALAZINE 50 milliGRAM(s) Oral every 12 hours  hydrOXYzine hydrochloride 25 milliGRAM(s) Oral two times a day  lactobacillus acidophilus 1 Tablet(s) Oral daily  metoclopramide 10 milliGRAM(s) Oral three times a day before meals  metoprolol succinate ER 25 milliGRAM(s) Oral daily  micafungin IVPB 100 milliGRAM(s) IV Intermittent every 24 hours  morphine ER Tablet 200 milliGRAM(s) Oral every 8 hours  NIFEdipine XL 30 milliGRAM(s) Oral <User Schedule>  nystatin    Suspension 732720 Unit(s) Oral four times a day  pantoprazole    Tablet 40 milliGRAM(s) Oral before breakfast  predniSONE   Tablet 40 milliGRAM(s) Oral daily  rivaroxaban 20 milliGRAM(s) Oral every 24 hours  senna 2 Tablet(s) Oral at bedtime  traZODone 100 milliGRAM(s) Oral at bedtime    PRN MEDICATIONS  acetaminophen   Tablet 650 milliGRAM(s) Oral every 4 hours PRN  guaiFENesin/dextromethorphan  Syrup 10 milliLiter(s) Oral every 6 hours PRN  HYDROmorphone   Tablet 8 milliGRAM(s) Oral every 4 hours PRN  ondansetron Injectable 4 milliGRAM(s) IV Push every 6 hours PRN      VITAL SIGNS: Last 24 Hours  T(C): 37.9 (21 Jun 2018 17:30), Max: 38.6 (21 Jun 2018 15:32)  T(F): 100.2 (21 Jun 2018 17:30), Max: 101.5 (21 Jun 2018 15:32)  HR: 87 (21 Jun 2018 17:30) (87 - 95)  BP: 140/64 (21 Jun 2018 17:30) (138/63 - 177/102)  BP(mean): --  RR: 20 (21 Jun 2018 13:21) (19 - 20)  SpO2: 98% (21 Jun 2018 10:54) (98% - 98%)    LABS:             13.4   16.60 )-----------( 272      ( 21 Jun 2018 12:13 )             41.5     06-21    134<L>  |  92<L>  |  21<H>  ----------------------------<  137<H>  3.7   |  26  |  1.1    Ca    9.1      21 Jun 2018 12:13      Culture - Sputum (collected 19 Jun 2018 23:00)  Source: .Sputum Sputum  Gram Stain (20 Jun 2018 14:50):    Moderate polymorphonuclear leukocytes seen per low power field    Few Squamous epithelial cells seen per low power field    Numerous Gram Positive Cocci seen per oil power field    Moderate Gram Negative Rods seen per oil power field    Preliminary Report (21 Jun 2018 10:27):    Numerous Klebsiella pneumoniae    Normal Respiratory Dariana absent    RADIOLOGY: No new imaging studies to report.      PHYSICAL EXAM:    GENERAL: NAD, well-developed, AAOx3, visible shaking.  HEENT:  Atraumatic, Normocephalic. EOMI, PERRLA, conjunctiva and sclera clear.  PULMONARY: Equal expansion, Course lung shounds BL.  CARDIOVASCULAR: Regular rate and rhythm; No murmurs, rubs, or gallops appreciated.  GASTROINTESTINAL: Soft, Nontender, Nondistended; Bowel sounds present  MUSCULOSKELETAL: 1+ edema in lower extremities below the knee BL.  SKIN: Left medial arm warm, non-induration, erythematous with no sign of lymphangitis and is painful to touch.       ADMISSION SUMMARY  48 y/o F with significant PMH of Lupus, CHF s/p AICD+PPM, DVT on xarelto, COPD, RA, laryngeal cancer s/p excision in november, s/p 2 week history of esophageal thrush 4 weeks ago status here with laryngeal candidiasis (04 Jun 2018 20:08) which failed outpatient PO antifungal treatment. Course complicated by acute renal failure, which has since improved. Later had laryngeal edema identified treated empirically with IV steroid which improved. Course later complicated by acute exacerbation of underlying COPD which is now improving. Now course complicated by sepsis secondary to LUE cellulitis and adjacent superficial thrombophlebitis. Patient max temperature 101.5F.    1. Sepsis 2/2 LUE cellulitis vs. pneumonia - Left upper extremity erythema unchanged from yesterday is warm/painful to touch and not indurated at this time, CXR unremarkable on 06/18/2018.   ·	Tmax:101.5F; WBC 16.6; numerous Klebsiella pneumonia in preliminary sputum culture  ·	Warm compress to affected area on left arm; elevation encouraged.  ·	Covering with clindamycin, as vantin and avelox allergies have been documented.  ·	Pending ID recommendations.    2. COPD exacerbation - Continues to have course breath sounds with improved wheezing BL; able to ambulate without difficulty.  ·	Managing with Prednisone 40mg QD PO, Duonebs Q4H + PRN, BREO (home prescription)  ·	Peak flow today was 200; will check again tomorrow.  ·	Advised on smoking cessation.     3. Laryngeal Candidiasis, s/p EGD (05/15/2018) - Improving  ·	IV micafungin continued; Pending ID recommendation for antifungal therapy upon discharge.    4. N/V and abdominal pain (resolved) - Gastroenteritis viral vs. bacteria vs. micafungin side effect  ·	 Pt reports normal BM.  ·	 Zofran PRN    5. TIAN on CKD likely 2/2 prerenal causes (resolved)  ·	 Amphotericin was discontinued  ·	 Monitoring BMP daily  ·	 Prior urine studies showed FeNa 1.3, suggestive of intrinsic renal injury    6. Throat Cancer s/p excision (Nov 2017) - as per pt she was put on CellCept for her LUPUS 2 years ago by her Rheumatologist  and stopped taking it in Feb2018. She said she is not on any other chemotherapy.   ·	Heme/onc consult rec: f/u with her doctor outpatient; pt was seen by Heme/Onc () on last admission.    7. CHF s/p AICD + PPM:   ·	echo done 6/7 showed LVEF 65%     8. Acute DVT  ·	On Xarelto.    9. HTN (stable)  ·	 Toprol,   ·	 Hydralazine    10. RA (controlled)  ·	Outpt benlysta + methotrexate shots  ·	Folic acid supplementation    11. Chronic Pain 2/2 Multiple Back Sx  ·	 MS contin   ·	 Dilaudid PRN  ·	 Gabapentin    12. Anxiety  ·	 Xanax PRN per home dosing  ·	 trazodone    13. Hyperkalemia (resolved)    DVT PPX: back on home NOAC.  GI PPx: protonix.  Dispo: Home after medically stable.    Present today:  (Y) Congestive Heart Failure, Yes? ( )Acute / ( )Acute on Chronic / (X)Chronic  :  ( )Systolic / (X)Diastolic               Plan: Follow up with cardiologist for further management as outpatient.  (?) Complicated Pneumonia, Type?  ( )Parapneumonic effusion / ( )Abscess / ( ) Multilobar / ( )Other               Plan: Suspected, will consider CXR if patient does not improve.    (X) Discussion with patient and/or family regarding goals of care  ( ) Discussed Case and Plan with Medical Attending, Name:    Planned Disposition: Discharge home when medical condition is optimized.

## 2018-06-21 NOTE — PROGRESS NOTE ADULT - SUBJECTIVE AND OBJECTIVE BOX
OVERNIGHT EVENTS: C/O ZULEMA SWELLING REDNESS, CHILLS, WHEEZING IMPROVED    Vital Signs Last 24 Hrs  T(C): 37.5 (21 Jun 2018 11:15), Max: 38 (21 Jun 2018 09:07)  T(F): 99.5 (21 Jun 2018 11:15), Max: 100.4 (21 Jun 2018 09:07)  HR: 88 (21 Jun 2018 06:57) (78 - 95)  BP: 171/88 (21 Jun 2018 06:57) (157/80 - 177/102)  BP(mean): --  RR: 19 (21 Jun 2018 06:02) (19 - 20)  SpO2: 98% (21 Jun 2018 10:54) (97% - 98%)    PHYSICAL EXAMINATION:    GENERAL: The patient is awake and alert in no apparent distress.     HEENT: Head is normocephalic and atraumatic. Extraocular muscles are intact. Mucous membranes are moist.    NECK: Supple.    LUNGS: B/L WHEEZING    HEART: Regular rate and rhythm without murmur.    ABDOMEN: Soft, nontender, and nondistended.      EXTREMITIES: L FOREARM WARM/ RED    NEUROLOGIC: Grossly intact.    SKIN: No ulceration or induration present.      LABS:                        12.9   19.67 )-----------( 308      ( 20 Jun 2018 06:24 )             40.2     06-20    139  |  97<L>  |  29<H>  ----------------------------<  120<H>  4.0   |  26  |  1.1    Ca    9.6      20 Jun 2018 06:24                              MICROBIOLOGY:  Culture Results:   Numerous Klebsiella pneumoniae  Normal Respiratory Dariana absent (06-19 @ 23:00)      MEDICATIONS  (STANDING):  ALBUTerol/ipratropium for Nebulization 3 milliLiter(s) Nebulizer every 6 hours  ALPRAZolam 2 milliGRAM(s) Oral three times a day  azithromycin   Tablet 250 milliGRAM(s) Oral daily  bismuth subsalicylate Liquid 30 milliLiter(s) Oral daily  diphenhydrAMINE   Injectable 25 milliGRAM(s) IV Push once  docusate sodium 100 milliGRAM(s) Oral three times a day  fluticasone furoate/vilanterol 100-25 MICROgram(s) Inhaler 1 Puff(s) Inhalation daily  folic acid 1 milliGRAM(s) Oral daily  gabapentin 200 milliGRAM(s) Oral two times a day  hydrALAZINE 25 milliGRAM(s) Oral every 12 hours  hydrOXYzine hydrochloride 25 milliGRAM(s) Oral two times a day  methylPREDNISolone sodium succinate Injectable 40 milliGRAM(s) IV Push daily  metoclopramide 10 milliGRAM(s) Oral three times a day before meals  metoprolol succinate ER 25 milliGRAM(s) Oral daily  micafungin IVPB 100 milliGRAM(s) IV Intermittent every 24 hours  morphine ER Tablet 200 milliGRAM(s) Oral every 8 hours  nystatin    Suspension 050995 Unit(s) Oral four times a day  pantoprazole    Tablet 40 milliGRAM(s) Oral before breakfast  rivaroxaban 20 milliGRAM(s) Oral every 24 hours  senna 2 Tablet(s) Oral at bedtime  traZODone 100 milliGRAM(s) Oral at bedtime    MEDICATIONS  (PRN):  acetaminophen   Tablet 650 milliGRAM(s) Oral every 4 hours PRN For Temp greater than 38 C (100.4 F)  guaiFENesin/dextromethorphan  Syrup 10 milliLiter(s) Oral every 6 hours PRN Cough  HYDROmorphone   Tablet 8 milliGRAM(s) Oral every 4 hours PRN Severe Pain (7 - 10)  ondansetron Injectable 4 milliGRAM(s) IV Push every 6 hours PRN Nausea and/or Vomiting      RADIOLOGY & ADDITIONAL STUDIES:

## 2018-06-21 NOTE — PROVIDER CONTACT NOTE (OTHER) - ACTION/TREATMENT ORDERED:
Patient given ICE pack as per request and IV lock removed as per md Pasha [phone] no further action indicated at this time.
waiting on chest Xray  pt left unit  to smoke despite education  no additional interventions at this time

## 2018-06-21 NOTE — PROGRESS NOTE ADULT - ASSESSMENT
46 y/o F with significant PMH of Lupus, CHF s/p AICD+PPM, DVT on xarelto, COPD, RA, laryngeal cancer s/p excision in november, s/p  2 week history of esophageal thrush 4 weeks ago status here with laryngeal candidiasis which failed outpatient PO antifungal treatment. Course complicated by acute renal failure, which has since improved. Later had laryngeal edema identified treated empirically with IV steroid which improved. Course later complicated by acute exacerbation of underlying COPD which is now improving. Now course complicated by sepsis secondary to LUE cellulitis and adjacent superficial thrombophlebitis    # COPD exacerbation , actively wheezing  - c/w duonebs q4+prn,   titrate IV steroid  check peak flow  - c/w home breo  - ADVISED ON SMOKING CESSATION.  - added abx.    #Sepsis 2/2 LUE cellulitis  warm compress, elevation  ID f/u  for now will cover with clinda, (vantin and avelox allergies documented)      # N/V and abdominal pain- gastroenteritis viral vs bacteria vs micafungin side effect  resolved previously  - pt afebrile, hold on Abx for now  - KUB neg for free air, CT A/P : WNL  - pt reports normal BMs  - Zofran PRN  -  hepatic panel: WNL    # TIAN on CKD likely 2/2 to prerenal as per renal recs- improved now  - amphotericin stopped  - c/w to monitor BMP for Cr,   - urine studies--> FeNa 1.3 (intrinsic renal injury)    # Laryngeal Candidiasis, s/p EGD 5/15/2018  - c/w IV micafungin while inpatient, Itraconazole 200mg PO BID x 7D outpatient as per ID rec  - will clarify with ID prior to D/C for definite course of antifungal therapy upon discharge  - HIV negative (5/10)  - f/u CXR --> no ACS   - f/u blood cultures - negative    # Throat Cancer s/p excision in Nov 2017; as per pt she was put on CellCept for her LUPUS 2 years ago by her Rheumatologist  and stopped taking it in Feb2018. She said she is not on any other chemotherapy.   - Heme/onc consult ordered --> f/u with her doctor outpatient; pt was seen by Heme/Onc () on last admission.      # CHF s/p AICD+PPM:   - echo done 6/7 showed LVEF 65%     # Acute DVT  - On Xarelto.    # HTN - stable  - c/w toprol,   - d/c hydralazine    # RA - controlled  - c/w outpt benlysta + methotrexate shots  - c/w folic acid supplementation    # Chronic Pain 2/2 Multiple Back Sx  - c/w MS contin   - c/w dilaudid PRN  - c/w gabapentin    # Anxiety  - c/w xanax PRN per home dosing  - c/w trazodone    # hyperkalemia - resolved      DVT PPX: back on home NOAC  GI PPx: protonix  Dispo: Home after medically stable

## 2018-06-21 NOTE — PROGRESS NOTE ADULT - ASSESSMENT
uncontrolled HTN  start Nifedipine xl 30 mg at noon   continue with Hydralazine but increase to 50 bid

## 2018-06-21 NOTE — PROGRESS NOTE ADULT - ASSESSMENT
Impression:    COPD exacerbation  Active smoker  INCREASE BP  ZULEMA CELLULITIS      Recommendations:    Duoneb q 4hrs and PRN  VANCO/ TANESHA TILL CX/ ID EVAL  change solumedrol to prednisone 40 mg once daily  Smoking cessation  DVT Px  blood cx

## 2018-06-22 LAB
-  AMIKACIN: SIGNIFICANT CHANGE UP
-  AMOXICILLIN/CLAVULANIC ACID: SIGNIFICANT CHANGE UP
-  AMPICILLIN/SULBACTAM: SIGNIFICANT CHANGE UP
-  AMPICILLIN: SIGNIFICANT CHANGE UP
-  AZTREONAM: SIGNIFICANT CHANGE UP
-  CEFAZOLIN: SIGNIFICANT CHANGE UP
-  CEFEPIME: SIGNIFICANT CHANGE UP
-  CEFOXITIN: SIGNIFICANT CHANGE UP
-  CEFTRIAXONE: SIGNIFICANT CHANGE UP
-  CIPROFLOXACIN: SIGNIFICANT CHANGE UP
-  ERTAPENEM: SIGNIFICANT CHANGE UP
-  GENTAMICIN: SIGNIFICANT CHANGE UP
-  IMIPENEM: SIGNIFICANT CHANGE UP
-  LEVOFLOXACIN: SIGNIFICANT CHANGE UP
-  MEROPENEM: SIGNIFICANT CHANGE UP
-  PIPERACILLIN/TAZOBACTAM: SIGNIFICANT CHANGE UP
-  TOBRAMYCIN: SIGNIFICANT CHANGE UP
-  TRIMETHOPRIM/SULFAMETHOXAZOLE: SIGNIFICANT CHANGE UP
ANION GAP SERPL CALC-SCNC: 17 MMOL/L — HIGH (ref 7–14)
BUN SERPL-MCNC: 16 MG/DL — SIGNIFICANT CHANGE UP (ref 10–20)
CALCIUM SERPL-MCNC: 8.8 MG/DL — SIGNIFICANT CHANGE UP (ref 8.5–10.1)
CHLORIDE SERPL-SCNC: 94 MMOL/L — LOW (ref 98–110)
CO2 SERPL-SCNC: 24 MMOL/L — SIGNIFICANT CHANGE UP (ref 17–32)
CREAT SERPL-MCNC: 1 MG/DL — SIGNIFICANT CHANGE UP (ref 0.7–1.5)
CULTURE RESULTS: SIGNIFICANT CHANGE UP
GLUCOSE SERPL-MCNC: 162 MG/DL — HIGH (ref 70–99)
HCT VFR BLD CALC: 38 % — SIGNIFICANT CHANGE UP (ref 37–47)
HGB BLD-MCNC: 12.5 G/DL — SIGNIFICANT CHANGE UP (ref 12–16)
MCHC RBC-ENTMCNC: 27.6 PG — SIGNIFICANT CHANGE UP (ref 27–31)
MCHC RBC-ENTMCNC: 32.9 G/DL — SIGNIFICANT CHANGE UP (ref 32–37)
MCV RBC AUTO: 83.9 FL — SIGNIFICANT CHANGE UP (ref 81–99)
METHOD TYPE: SIGNIFICANT CHANGE UP
NRBC # BLD: 0 /100 WBCS — SIGNIFICANT CHANGE UP (ref 0–0)
ORGANISM # SPEC MICROSCOPIC CNT: SIGNIFICANT CHANGE UP
ORGANISM # SPEC MICROSCOPIC CNT: SIGNIFICANT CHANGE UP
PLATELET # BLD AUTO: 283 K/UL — SIGNIFICANT CHANGE UP (ref 130–400)
POTASSIUM SERPL-MCNC: 3.9 MMOL/L — SIGNIFICANT CHANGE UP (ref 3.5–5)
POTASSIUM SERPL-SCNC: 3.9 MMOL/L — SIGNIFICANT CHANGE UP (ref 3.5–5)
RBC # BLD: 4.53 M/UL — SIGNIFICANT CHANGE UP (ref 4.2–5.4)
RBC # FLD: 13.4 % — SIGNIFICANT CHANGE UP (ref 11.5–14.5)
SODIUM SERPL-SCNC: 135 MMOL/L — SIGNIFICANT CHANGE UP (ref 135–146)
SPECIMEN SOURCE: SIGNIFICANT CHANGE UP
WBC # BLD: 20.39 K/UL — HIGH (ref 4.8–10.8)
WBC # FLD AUTO: 20.39 K/UL — HIGH (ref 4.8–10.8)

## 2018-06-22 RX ORDER — LINEZOLID 600 MG/300ML
600 INJECTION, SOLUTION INTRAVENOUS EVERY 12 HOURS
Qty: 0 | Refills: 0 | Status: DISCONTINUED | OUTPATIENT
Start: 2018-06-22 | End: 2018-06-23

## 2018-06-22 RX ORDER — LINEZOLID 600 MG/300ML
INJECTION, SOLUTION INTRAVENOUS
Qty: 0 | Refills: 0 | Status: DISCONTINUED | OUTPATIENT
Start: 2018-06-22 | End: 2018-06-22

## 2018-06-22 RX ORDER — ACETAMINOPHEN 500 MG
650 TABLET ORAL ONCE
Qty: 0 | Refills: 0 | Status: COMPLETED | OUTPATIENT
Start: 2018-06-22 | End: 2018-06-22

## 2018-06-22 RX ORDER — LINEZOLID 600 MG/300ML
1 INJECTION, SOLUTION INTRAVENOUS
Qty: 10 | Refills: 0 | OUTPATIENT
Start: 2018-06-22 | End: 2018-06-26

## 2018-06-22 RX ADMIN — LINEZOLID 600 MILLIGRAM(S): 600 INJECTION, SOLUTION INTRAVENOUS at 12:19

## 2018-06-22 RX ADMIN — Medication 30 MILLIGRAM(S): at 12:20

## 2018-06-22 RX ADMIN — Medication 1 TABLET(S): at 12:20

## 2018-06-22 RX ADMIN — Medication 650 MILLIGRAM(S): at 06:13

## 2018-06-22 RX ADMIN — Medication 25 MILLIGRAM(S): at 18:36

## 2018-06-22 RX ADMIN — MORPHINE SULFATE 200 MILLIGRAM(S): 50 CAPSULE, EXTENDED RELEASE ORAL at 13:49

## 2018-06-22 RX ADMIN — Medication 100 MILLIGRAM(S): at 22:04

## 2018-06-22 RX ADMIN — RIVAROXABAN 20 MILLIGRAM(S): KIT at 18:35

## 2018-06-22 RX ADMIN — MORPHINE SULFATE 200 MILLIGRAM(S): 50 CAPSULE, EXTENDED RELEASE ORAL at 22:37

## 2018-06-22 RX ADMIN — GABAPENTIN 200 MILLIGRAM(S): 400 CAPSULE ORAL at 18:36

## 2018-06-22 RX ADMIN — Medication 2 MILLIGRAM(S): at 22:07

## 2018-06-22 RX ADMIN — GABAPENTIN 200 MILLIGRAM(S): 400 CAPSULE ORAL at 06:03

## 2018-06-22 RX ADMIN — Medication 3 MILLILITER(S): at 07:20

## 2018-06-22 RX ADMIN — MORPHINE SULFATE 200 MILLIGRAM(S): 50 CAPSULE, EXTENDED RELEASE ORAL at 18:47

## 2018-06-22 RX ADMIN — Medication 10 MILLIGRAM(S): at 06:08

## 2018-06-22 RX ADMIN — MORPHINE SULFATE 200 MILLIGRAM(S): 50 CAPSULE, EXTENDED RELEASE ORAL at 22:07

## 2018-06-22 RX ADMIN — Medication 2 MILLIGRAM(S): at 06:01

## 2018-06-22 RX ADMIN — Medication 25 MILLIGRAM(S): at 06:03

## 2018-06-22 RX ADMIN — Medication 40 MILLIGRAM(S): at 06:04

## 2018-06-22 RX ADMIN — Medication 30 MILLILITER(S): at 12:19

## 2018-06-22 RX ADMIN — Medication 650 MILLIGRAM(S): at 02:47

## 2018-06-22 RX ADMIN — Medication 50 MILLIGRAM(S): at 18:35

## 2018-06-22 RX ADMIN — SENNA PLUS 2 TABLET(S): 8.6 TABLET ORAL at 22:04

## 2018-06-22 RX ADMIN — Medication 50 MILLIGRAM(S): at 06:03

## 2018-06-22 RX ADMIN — Medication 2 MILLIGRAM(S): at 13:49

## 2018-06-22 RX ADMIN — Medication 500000 UNIT(S): at 18:36

## 2018-06-22 RX ADMIN — Medication 3 MILLILITER(S): at 19:09

## 2018-06-22 RX ADMIN — MORPHINE SULFATE 200 MILLIGRAM(S): 50 CAPSULE, EXTENDED RELEASE ORAL at 06:01

## 2018-06-22 RX ADMIN — Medication 1 MILLIGRAM(S): at 12:20

## 2018-06-22 RX ADMIN — PANTOPRAZOLE SODIUM 40 MILLIGRAM(S): 20 TABLET, DELAYED RELEASE ORAL at 06:02

## 2018-06-22 RX ADMIN — Medication 500000 UNIT(S): at 12:20

## 2018-06-22 NOTE — PROGRESS NOTE ADULT - SUBJECTIVE AND OBJECTIVE BOX
ILEANA MCNEIL  47y  Female      Patient is a 47y old  Female who presents with a chief complaint of worsening laryngeal candidiasis (04 Jun 2018 20:08)      INTERVAL HPI/OVERNIGHT EVENTS: c/o b/l forearm pains. still a bit sob/ wheezing as prior but able to ambulate. less chills. better appetite      REVIEW OF SYSTEMS:  as above  All other review of systems negative    T(C): 36.4 (06-22-18 @ 12:52), Max: 38.7 (06-22-18 @ 02:23)  HR: 86 (06-22-18 @ 12:52) (86 - 112)  BP: 136/74 (06-22-18 @ 12:52) (132/61 - 166/84)  RR: 18 (06-22-18 @ 12:52) (18 - 20)  SpO2: --  Wt(kg): --Vital Signs Last 24 Hrs  T(C): 36.4 (22 Jun 2018 12:52), Max: 38.7 (22 Jun 2018 02:23)  T(F): 97.5 (22 Jun 2018 12:52), Max: 101.7 (22 Jun 2018 02:23)  HR: 86 (22 Jun 2018 12:52) (86 - 112)  BP: 136/74 (22 Jun 2018 12:52) (132/61 - 166/84)  BP(mean): --  RR: 18 (22 Jun 2018 12:52) (18 - 20)  SpO2: --        PHYSICAL EXAM:  GENERAL: NAD  PSYCH: no agitation, baseline mentation  HEENT hoarse  NERVOUS SYSTEM:  Alert & Oriented X3, no new focal deficits  PULMONARY: coarse b/l expiratory wheeze maybe mildly improved, able to speak full sentences  CARDIOVASCULAR: Regular rate and rhythm; No murmurs, rubs, or gallops  GI: Soft, Nontender, Nondistended; Bowel sounds present   no cvat  EXTREMITIES:  2+ Peripheral Pulses, No clubbing, cyanosis, or edema, LUE thrombophlebitis/erythema improving    Consultant(s) Notes Reviewed:  [x ] YES  [ ] NO    Discussed with Consultants/Other Providers [ x] YES     LABS                          12.5   20.39 )-----------( 283      ( 22 Jun 2018 06:23 )             38.0     06-22    135  |  94<L>  |  16  ----------------------------<  162<H>  3.9   |  24  |  1.0    Ca    8.8      22 Jun 2018 06:23            Lactate Trend        CAPILLARY BLOOD GLUCOSE            RADIOLOGY & ADDITIONAL TESTS:    Imaging Personally Reviewed:  [ ] YES  [ ] NO    HEALTH ISSUES - PROBLEM Dx:

## 2018-06-22 NOTE — PROGRESS NOTE ADULT - SUBJECTIVE AND OBJECTIVE BOX
OVERNIGHT EVENTS: wheezing better, suraj swelling/ pain/ T    Vital Signs Last 24 Hrs  T(C): 37.4 (22 Jun 2018 07:26), Max: 38.7 (22 Jun 2018 02:23)  T(F): 99.4 (22 Jun 2018 07:26), Max: 101.7 (22 Jun 2018 02:23)  HR: 90 (22 Jun 2018 06:07) (87 - 112)  BP: 132/61 (22 Jun 2018 06:07) (132/61 - 166/84)  BP(mean): --  RR: 18 (22 Jun 2018 06:07) (18 - 20)  SpO2: 98% (21 Jun 2018 10:54) (98% - 98%)    PHYSICAL EXAMINATION:    GENERAL: The patient is awake and alert in no apparent distress.     HEENT: Head is normocephalic and atraumatic. Extraocular muscles are intact. Mucous membranes are moist.    NECK: Supple.    LUNGS: MILD WHEEZING    HEART: Regular rate and rhythm without murmur.    ABDOMEN: Soft, nontender, and nondistended.      EXTREMITIES: DECREASE REDNESS LUE    NEUROLOGIC: Grossly intact.    SKIN: No ulceration or induration present.      LABS:                        12.5   20.39 )-----------( 283      ( 22 Jun 2018 06:23 )             38.0     06-22    135  |  94<L>  |  16  ----------------------------<  162<H>  3.9   |  24  |  1.0    Ca    8.8      22 Jun 2018 06:23                              MICROBIOLOGY:  Culture Results:   Numerous Klebsiella pneumoniae  Normal Respiratory Dariana absent (06-19 @ 23:00)      MEDICATIONS  (STANDING):  ALBUTerol/ipratropium for Nebulization 3 milliLiter(s) Nebulizer every 6 hours  ALPRAZolam 2 milliGRAM(s) Oral three times a day  bismuth subsalicylate Liquid 30 milliLiter(s) Oral daily  diphenhydrAMINE   Injectable 25 milliGRAM(s) IV Push once  docusate sodium 100 milliGRAM(s) Oral three times a day  fluticasone furoate/vilanterol 100-25 MICROgram(s) Inhaler 1 Puff(s) Inhalation daily  folic acid 1 milliGRAM(s) Oral daily  gabapentin 200 milliGRAM(s) Oral two times a day  hydrALAZINE 50 milliGRAM(s) Oral every 12 hours  hydrOXYzine hydrochloride 25 milliGRAM(s) Oral two times a day  lactobacillus acidophilus 1 Tablet(s) Oral daily  linezolid    Tablet 600 milliGRAM(s) Oral every 12 hours  metoprolol succinate ER 25 milliGRAM(s) Oral daily  micafungin IVPB 100 milliGRAM(s) IV Intermittent every 24 hours  morphine ER Tablet 200 milliGRAM(s) Oral every 8 hours  NIFEdipine XL 30 milliGRAM(s) Oral <User Schedule>  nystatin    Suspension [id number] Unit(s) Oral four times a day  pantoprazole    Tablet 40 milliGRAM(s) Oral before breakfast  predniSONE   Tablet 40 milliGRAM(s) Oral daily  rivaroxaban 20 milliGRAM(s) Oral every 24 hours  senna 2 Tablet(s) Oral at bedtime  traZODone 100 milliGRAM(s) Oral at bedtime    MEDICATIONS  (PRN):  acetaminophen   Tablet 650 milliGRAM(s) Oral every 4 hours PRN For Temp greater than 38 C (100.4 F)  guaiFENesin    Syrup 200 milliGRAM(s) Oral every 6 hours PRN Cough      RADIOLOGY & ADDITIONAL STUDIES:

## 2018-06-22 NOTE — PROGRESS NOTE ADULT - ASSESSMENT
ILEANA MCNEIL 47y Female  MRN#: [id number]   CODE STATUS:________      SUBJECTIVE  Patient is a 47y old Female who presents with a chief complaint of worsening laryngeal candidiasis (04 Jun 2018 20:08)  Currently admitted to medicine with the primary diagnosis of Candida esophagitis  Hospital course has been complicated by _______.   Today is hospital day 18d, and this morning she is _________ and reports ________ overnight events.     Present Today:           Rodarte Catheter ()No/ ()Yes? Indication:          Central Line ()No/ ()Yes? Indication:          IV Fluids ()No/ ()Yes? Type:  Rate:  Indication:      OBJECTIVE  PAST MEDICAL & SURGICAL HISTORY  DVT (deep venous thrombosis)  COPD (chronic obstructive pulmonary disease)  CHF (congestive heart failure)  HTN (hypertension)  RA (rheumatoid arthritis)  Throat cancer  AICD (automatic cardioverter/defibrillator) present  Lupus  History of laryngeal cancer  S/P cholecystectomy  S/P appendectomy    ALLERGIES:  Avelox (Other)  Plaquenil (Other)  Vantin (Other (Mild))    MEDICATIONS:  STANDING MEDICATIONS  ALBUTerol/ipratropium for Nebulization 3 milliLiter(s) Nebulizer every 6 hours  ALPRAZolam 2 milliGRAM(s) Oral three times a day  bismuth subsalicylate Liquid 30 milliLiter(s) Oral daily  clindamycin IVPB      clindamycin IVPB 600 milliGRAM(s) IV Intermittent every 8 hours  diphenhydrAMINE   Injectable 25 milliGRAM(s) IV Push once  docusate sodium 100 milliGRAM(s) Oral three times a day  fluticasone furoate/vilanterol 100-25 MICROgram(s) Inhaler 1 Puff(s) Inhalation daily  folic acid 1 milliGRAM(s) Oral daily  gabapentin 200 milliGRAM(s) Oral two times a day  hydrALAZINE 50 milliGRAM(s) Oral every 12 hours  hydrOXYzine hydrochloride 25 milliGRAM(s) Oral two times a day  lactobacillus acidophilus 1 Tablet(s) Oral daily  metoclopramide 10 milliGRAM(s) Oral three times a day before meals  metoprolol succinate ER 25 milliGRAM(s) Oral daily  micafungin IVPB 100 milliGRAM(s) IV Intermittent every 24 hours  morphine ER Tablet 200 milliGRAM(s) Oral every 8 hours  NIFEdipine XL 30 milliGRAM(s) Oral <User Schedule>  nystatin    Suspension 167791 Unit(s) Oral four times a day  pantoprazole    Tablet 40 milliGRAM(s) Oral before breakfast  predniSONE   Tablet 40 milliGRAM(s) Oral daily  rivaroxaban 20 milliGRAM(s) Oral every 24 hours  senna 2 Tablet(s) Oral at bedtime  traZODone 100 milliGRAM(s) Oral at bedtime    PRN MEDICATIONS  acetaminophen   Tablet 650 milliGRAM(s) Oral every 4 hours PRN  guaiFENesin/dextromethorphan  Syrup 10 milliLiter(s) Oral every 6 hours PRN  ondansetron Injectable 4 milliGRAM(s) IV Push every 6 hours PRN      VITAL SIGNS: Last 24 Hours  T(C): 38.7 (22 Jun 2018 02:23), Max: 38.7 (22 Jun 2018 02:23)  T(F): 101.7 (22 Jun 2018 02:23), Max: 101.7 (22 Jun 2018 02:23)  HR: 112 (22 Jun 2018 02:23) (87 - 112)  BP: 166/84 (22 Jun 2018 02:23) (138/63 - 177/102)  BP(mean): --  RR: 18 (22 Jun 2018 02:23) (18 - 20)  SpO2: 98% (21 Jun 2018 10:54) (98% - 98%)    LABS:                        13.4   16.60 )-----------( 272      ( 21 Jun 2018 12:13 )             41.5     06-21    134<L>  |  92<L>  |  21<H>  ----------------------------<  137<H>  3.7   |  26  |  1.1    Ca    9.1      21 Jun 2018 12:13                Culture - Sputum (collected 19 Jun 2018 23:00)  Source: .Sputum Sputum  Gram Stain (20 Jun 2018 14:50):    Moderate polymorphonuclear leukocytes seen per low power field    Few Squamous epithelial cells seen per low power field    Numerous Gram Positive Cocci seen per oil power field    Moderate Gram Negative Rods seen per oil power field  Preliminary Report (21 Jun 2018 10:27):    Numerous Klebsiella pneumoniae    Normal Respiratory Dariana absent          RADIOLOGY:      PHYSICAL EXAM:  GENERAL: NAD, well-developed, AAOx3  HEENT:  Atraumatic, Normocephalic. EOMI, PERRLA, conjunctiva and sclera clear, No JVD  PULMONARY: Clear to auscultation bilaterally; No wheeze  CARDIOVASCULAR: Regular rate and rhythm; No murmurs, rubs, or gallops  GASTROINTESTINAL: Soft, Nontender, Nondistended; Bowel sounds present  MUSCULOSKELETAL:  2+ Peripheral Pulses, No clubbing, cyanosis, or edema  NEUROLOGY: non-focal  SKIN: No rashes or lesions    ADMISSION SUMMARY  48 y/o F with significant PMH of Lupus, CHF s/p AICD+PPM, DVT on xarelto, COPD, RA, laryngeal cancer s/p excision in november, s/p 2 week history of esophageal thrush 4 weeks ago status here with laryngeal candidiasis (04 Jun 2018 20:08) which failed outpatient PO antifungal treatment. Course complicated by acute renal failure, which has since improved. Later had laryngeal edema identified treated empirically with IV steroid which improved. Course later complicated by acute exacerbation of underlying COPD which is now improving. Now course complicated by sepsis secondary to LUE cellulitis and adjacent superficial thrombophlebitis. Patient max temperature 101.5F.    1. Sepsis 2/2 LUE cellulitis vs. pneumonia - Left upper extremity erythema unchanged from yesterday is warm/painful to touch and not indurated at this time, CXR unremarkable on 06/18/2018.   ·	Tmax:101.5F; WBC 16.6; numerous Klebsiella pneumonia in preliminary sputum culture  ·	Warm compress to affected area on left arm; elevation encouraged.  ·	Covering with clindamycin, as vantin and avelox allergies have been documented.  ·	Pending ID recommendations.    2. COPD exacerbation - Continues to have course breath sounds with improved wheezing BL; able to ambulate without difficulty.  ·	Managing with Prednisone 40mg QD PO, Duonebs Q4H + PRN, BREO (home prescription)  ·	Peak flow today was 200; will check again tomorrow.  ·	Advised on smoking cessation.     3. Laryngeal Candidiasis, s/p EGD (05/15/2018) - Improving  ·	IV micafungin continued; Pending ID recommendation for antifungal therapy upon discharge.    4. N/V and abdominal pain (resolved) - Gastroenteritis viral vs. bacteria vs. micafungin side effect  ·	 Pt reports normal BM.  ·	 Zofran PRN    5. TIAN on CKD likely 2/2 prerenal causes (resolved)  ·	 Amphotericin was discontinued  ·	 Monitoring BMP daily  ·	 Prior urine studies showed FeNa 1.3, suggestive of intrinsic renal injury    6. Throat Cancer s/p excision (Nov 2017) - as per pt she was put on CellCept for her LUPUS 2 years ago by her Rheumatologist  and stopped taking it in Feb2018. She said she is not on any other chemotherapy.   ·	Heme/onc consult rec: f/u with her doctor outpatient; pt was seen by Heme/Onc () on last admission.    7. CHF s/p AICD + PPM:   ·	echo done 6/7 showed LVEF 65%     8. Acute DVT  ·	On Xarelto.    9. HTN (stable)  ·	 Toprol  ·	 Hydralazine    10. RA (controlled)  ·	Outpt benlysta + methotrexate shots  ·	Folic acid supplementation    11. Chronic Pain 2/2 Multiple Back Sx  ·	 MS contin   ·	 Dilaudid PRN  ·	 Gabapentin    12. Anxiety  ·	 Xanax PRN per home dosing  ·	 trazodone    13. Hyperkalemia (resolved)    DVT PPX: back on home NOAC.  GI PPx: protonix.  Dispo: Home after medically stable.    Present today:  (Y) Congestive Heart Failure, Yes? ( )Acute / ( )Acute on Chronic / (X)Chronic  :  ( )Systolic / (X)Diastolic               Plan: Follow up with cardiologist for further management as outpatient.  (?) Complicated Pneumonia, Type?  ( )Parapneumonic effusion / ( )Abscess / ( ) Multilobar / ( )Other               Plan: Suspected, will consider CXR if patient does not improve.    (X) Discussion with patient and/or family regarding goals of care  ( ) Discussed Case and Plan with Medical Attending, Name:    Planned Disposition: Discharge home when medical condition is optimized. ILEANA MCNEIL 47y Female  MRN#: [id number]   CODE STATUS:________      SUBJECTIVE  Patient is a 47y old Female who presents with a chief complaint of worsening laryngeal candidiasis (04 Jun 2018 20:08)  Currently admitted to medicine with the primary diagnosis of Candida esophagitis  Hospital course has been complicated by phlebitis and suspected cellulitis in L arm.   Today is hospital day 18d, and this morning she is not feeling better. Overnight she complained of subjective fever and chills.     This morning, she is still having SOB with a nonproductive cough. She is also experiencing decreased appetite with associated nausea without vomit.     Present Today:           Rodarte Catheter (x)No/ ()Yes? Indication:          Central Line (x)No/ ()Yes? Indication:          IV Fluids (x)No/ ()Yes? Type:  Rate:  Indication:      OBJECTIVE  PAST MEDICAL & SURGICAL HISTORY  DVT (deep venous thrombosis)  COPD (chronic obstructive pulmonary disease)  CHF (congestive heart failure)  HTN (hypertension)  RA (rheumatoid arthritis)  Throat cancer  AICD (automatic cardioverter/defibrillator) present  Lupus  History of laryngeal cancer  S/P cholecystectomy  S/P appendectomy    ALLERGIES:  Avelox (Other)  Plaquenil (Other)  Vantin (Other (Mild))    MEDICATIONS:  STANDING MEDICATIONS  ALBUTerol/ipratropium for Nebulization 3 milliLiter(s) Nebulizer every 6 hours  ALPRAZolam 2 milliGRAM(s) Oral three times a day  bismuth subsalicylate Liquid 30 milliLiter(s) Oral daily  clindamycin IVPB      clindamycin IVPB 600 milliGRAM(s) IV Intermittent every 8 hours  diphenhydrAMINE   Injectable 25 milliGRAM(s) IV Push once  docusate sodium 100 milliGRAM(s) Oral three times a day  fluticasone furoate/vilanterol 100-25 MICROgram(s) Inhaler 1 Puff(s) Inhalation daily  folic acid 1 milliGRAM(s) Oral daily  gabapentin 200 milliGRAM(s) Oral two times a day  hydrALAZINE 50 milliGRAM(s) Oral every 12 hours  hydrOXYzine hydrochloride 25 milliGRAM(s) Oral two times a day  lactobacillus acidophilus 1 Tablet(s) Oral daily  metoclopramide 10 milliGRAM(s) Oral three times a day before meals  metoprolol succinate ER 25 milliGRAM(s) Oral daily  micafungin IVPB 100 milliGRAM(s) IV Intermittent every 24 hours  morphine ER Tablet 200 milliGRAM(s) Oral every 8 hours  NIFEdipine XL 30 milliGRAM(s) Oral <User Schedule>  nystatin    Suspension [id number] Unit(s) Oral four times a day  pantoprazole    Tablet 40 milliGRAM(s) Oral before breakfast  predniSONE   Tablet 40 milliGRAM(s) Oral daily  rivaroxaban 20 milliGRAM(s) Oral every 24 hours  senna 2 Tablet(s) Oral at bedtime  traZODone 100 milliGRAM(s) Oral at bedtime    PRN MEDICATIONS  acetaminophen   Tablet 650 milliGRAM(s) Oral every 4 hours PRN  guaiFENesin/dextromethorphan  Syrup 10 milliLiter(s) Oral every 6 hours PRN  ondansetron Injectable 4 milliGRAM(s) IV Push every 6 hours PRN      VITAL SIGNS: Last 24 Hours  T(C): 38.7 (22 Jun 2018 02:23), Max: 38.7 (22 Jun 2018 02:23)  T(F): 101.7 (22 Jun 2018 02:23), Max: 101.7 (22 Jun 2018 02:23)  HR: 112 (22 Jun 2018 02:23) (87 - 112)  BP: 166/84 (22 Jun 2018 02:23) (138/63 - 177/102)  BP(mean): --  RR: 18 (22 Jun 2018 02:23) (18 - 20)  SpO2: 98% (21 Jun 2018 10:54) (98% - 98%)    LABS:      YESTERDAYS LABS                  13.4   16.60 )-----------( 272      ( 21 Jun 2018 12:13 )             41.5     06-21    134<L>  |  92<L>  |  21<H>  ----------------------------<  137<H>  3.7   |  26  |  1.1    Ca    9.1      21 Jun 2018 12:13      Culture - Sputum (collected 19 Jun 2018 23:00)  Source: .Sputum Sputum  Gram Stain (20 Jun 2018 14:50):    Moderate polymorphonuclear leukocytes seen per low power field    Few Squamous epithelial cells seen per low power field    Numerous Gram Positive Cocci seen per oil power field    Moderate Gram Negative Rods seen per oil power field  Preliminary Report (21 Jun 2018 10:27):    Numerous Klebsiella pneumoniae    Normal Respiratory Dariana absent      RADIOLOGY: No new reports.      PHYSICAL EXAM:  GENERAL: NAD, well-developed, AAOx3.  HEENT:  Atraumatic, Normocephalic. EOMI, PERRLA, conjunctiva and sclera clear.  PULMONARY: Diffuse, course lung sounds BL with limited end-expiratory wheezes appreciated BL.  CARDIOVASCULAR: Regular rate and rhythm; No murmurs, rubs, or gallops  GASTROINTESTINAL: Soft, Nontender, Nondistended; Bowel sounds present  MUSCULOSKELETAL:  1+ edema in LE BL.  SKIN: Left arm ecchymosis and erythema that is painful and mildly warm to touch.     ADMISSION SUMMARY  48 y/o F with significant PMH of Lupus, CHF s/p AICD+PPM, DVT on xarelto, COPD, RA, laryngeal cancer s/p excision in november, s/p 2 week history of esophageal thrush 4 weeks ago status here with laryngeal candidiasis (04 Jun 2018 20:08) which failed outpatient PO antifungal treatment. Course complicated by acute renal failure, which has since improved. Later had laryngeal edema identified treated empirically with IV steroid which improved. Course later complicated by acute exacerbation of underlying COPD which is now improving. Now course complicated by sepsis secondary to LUE cellulitis and adjacent superficial thrombophlebitis. Patient max temperature 101.7F.     1. Sepsis 2/2 LUE cellulitis - Left upper extremity erythema unchanged from yesterday is warm/painful to touch and not indurated at this time, CXR unremarkable on 06/18/2018.   ·	Tmax:101.7F; WBC 16.6 yesterday and will follow today; numerous Klebsiella pneumonia in preliminary sputum culture  ·	Warm compress to affected area on left arm; elevation encouraged.  ·	Covering with clindamycin, as vantin and avelox allergies have been documented.  -Treatment with Clinda was not completed yesterday; patient reported burning during treatment and no longer has IV access.  ·	Pending ID recommendations.    2. COPD exacerbation - Continues to have course breath sounds with improved wheezing BL; able to ambulate without difficulty.  ·	Managing with Prednisone 40mg QD PO, Duonebs Q4H + PRN, BREO (home prescription)  ·	Peak flow today was 200; will check again tomorrow.  ·	Advised on smoking cessation.     3. Laryngeal Candidiasis, s/p EGD (05/15/2018) - Improving  ·	IV micafungin continued; Pending ID recommendation for antifungal therapy upon discharge.    4. N/V and abdominal pain (resolved) - Gastroenteritis viral vs. bacteria vs. micafungin side effect  ·	 Pt reports normal BM.  ·	 Zofran PRN    5. TIAN on CKD likely 2/2 prerenal causes (resolved)  ·	 Amphotericin was discontinued  ·	 Monitoring BMP daily  ·	 Prior urine studies showed FeNa 1.3, suggestive of intrinsic renal injury    6. Throat Cancer s/p excision (Nov 2017) - as per pt she was put on CellCept for her LUPUS 2 years ago by her Rheumatologist  and stopped taking it in Feb2018. She said she is not on any other chemotherapy.   ·	Heme/onc consult rec: f/u with her doctor outpatient; pt was seen by Heme/Onc () on last admission.    7. CHF s/p AICD + PPM:   ·	echo done 6/7 showed LVEF 65%     8. Acute DVT  ·	On Xarelto.    9. HTN (stable)  ·	 Toprol  ·	 Hydralazine    10. RA (controlled)  ·	Outpt benlysta + methotrexate shots  ·	Folic acid supplementation    11. Chronic Pain 2/2 Multiple Back Sx  ·	 MS contin   ·	 Dilaudid PRN  ·	 Gabapentin    12. Anxiety  ·	 Xanax PRN per home dosing  ·	 trazodone    13. Hyperkalemia (resolved)    DVT PPX: back on home NOAC.  GI PPx: protonix.  Dispo: Home after medically stable.    Present today:  (Y) Congestive Heart Failure, Yes? ( )Acute / ( )Acute on Chronic / (X)Chronic  :  ( )Systolic / (X)Diastolic               Plan: Follow up with cardiologist for further management as outpatient.  (?) Complicated Pneumonia, Type?  ( )Parapneumonic effusion / ( )Abscess / ( ) Multilobar / ( )Other               Plan: Suspected, will consider CXR if patient does not improve.    (X) Discussion with patient and/or family regarding goals of care  ( ) Discussed Case and Plan with Medical Attending, Name:    Planned Disposition: Discharge home when medical condition is optimized. ILEANA MCNEIL 47y Female  MRN#: [id number]    SUBJECTIVE  Patient is a 47y old Female who presents with a chief complaint of worsening laryngeal candidiasis (04 Jun 2018 20:08)  Currently admitted to medicine with the primary diagnosis of Candida esophagitis  Hospital course has been complicated by phlebitis and suspected cellulitis in L arm.   Today is hospital day 18d, and this morning she is not feeling better. Overnight she complained of subjective fever and chills.     This morning, she is still having SOB with a nonproductive cough. She is also experiencing decreased appetite with associated nausea without vomit.     Present Today:           Rodarte Catheter (x)No/ ()Yes? Indication:          Central Line (x)No/ ()Yes? Indication:          IV Fluids (x)No/ ()Yes? Type:  Rate:  Indication:      OBJECTIVE  PAST MEDICAL & SURGICAL HISTORY  DVT (deep venous thrombosis)  COPD (chronic obstructive pulmonary disease)  CHF (congestive heart failure)  HTN (hypertension)  RA (rheumatoid arthritis)  Throat cancer  AICD (automatic cardioverter/defibrillator) present  Lupus  History of laryngeal cancer  S/P cholecystectomy  S/P appendectomy    ALLERGIES:  Avelox (Other)  Plaquenil (Other)  Vantin (Other (Mild))    MEDICATIONS:  STANDING MEDICATIONS  ALBUTerol/ipratropium for Nebulization 3 milliLiter(s) Nebulizer every 6 hours  ALPRAZolam 2 milliGRAM(s) Oral three times a day  bismuth subsalicylate Liquid 30 milliLiter(s) Oral daily  clindamycin IVPB      clindamycin IVPB 600 milliGRAM(s) IV Intermittent every 8 hours  diphenhydrAMINE   Injectable 25 milliGRAM(s) IV Push once  docusate sodium 100 milliGRAM(s) Oral three times a day  fluticasone furoate/vilanterol 100-25 MICROgram(s) Inhaler 1 Puff(s) Inhalation daily  folic acid 1 milliGRAM(s) Oral daily  gabapentin 200 milliGRAM(s) Oral two times a day  hydrALAZINE 50 milliGRAM(s) Oral every 12 hours  hydrOXYzine hydrochloride 25 milliGRAM(s) Oral two times a day  lactobacillus acidophilus 1 Tablet(s) Oral daily  metoclopramide 10 milliGRAM(s) Oral three times a day before meals  metoprolol succinate ER 25 milliGRAM(s) Oral daily  micafungin IVPB 100 milliGRAM(s) IV Intermittent every 24 hours  morphine ER Tablet 200 milliGRAM(s) Oral every 8 hours  NIFEdipine XL 30 milliGRAM(s) Oral <User Schedule>  nystatin    Suspension [id number] Unit(s) Oral four times a day  pantoprazole    Tablet 40 milliGRAM(s) Oral before breakfast  predniSONE   Tablet 40 milliGRAM(s) Oral daily  rivaroxaban 20 milliGRAM(s) Oral every 24 hours  senna 2 Tablet(s) Oral at bedtime  traZODone 100 milliGRAM(s) Oral at bedtime    PRN MEDICATIONS  acetaminophen   Tablet 650 milliGRAM(s) Oral every 4 hours PRN  guaiFENesin/dextromethorphan  Syrup 10 milliLiter(s) Oral every 6 hours PRN  ondansetron Injectable 4 milliGRAM(s) IV Push every 6 hours PRN      VITAL SIGNS: Last 24 Hours  T(C): 38.7 (22 Jun 2018 02:23), Max: 38.7 (22 Jun 2018 02:23)  T(F): 101.7 (22 Jun 2018 02:23), Max: 101.7 (22 Jun 2018 02:23)  HR: 112 (22 Jun 2018 02:23) (87 - 112)  BP: 166/84 (22 Jun 2018 02:23) (138/63 - 177/102)  BP(mean): --  RR: 18 (22 Jun 2018 02:23) (18 - 20)  SpO2: 98% (21 Jun 2018 10:54) (98% - 98%)    LABS:  CBC Full  -  ( 22 Jun 2018 06:23 )  WBC Count : 20.39 K/uL  Hemoglobin : 12.5 g/dL  Hematocrit : 38.0 %  Platelet Count - Automated : 283 K/uL  Mean Cell Volume : 83.9 fL  Mean Cell Hemoglobin : 27.6 pg  Mean Cell Hemoglobin Concentration : 32.9 g/dL    (06/22/2018)  135  |  94<L>  |  16  ----------------------------<  162<H>  3.9   |  24  |  1.0        Culture - Sputum (collected 19 Jun 2018 23:00)  Source: .Sputum Sputum  Gram Stain (20 Jun 2018 14:50):    Moderate polymorphonuclear leukocytes seen per low power field    Few Squamous epithelial cells seen per low power field    Numerous Gram Positive Cocci seen per oil power field    Moderate Gram Negative Rods seen per oil power field    Preliminary Report (21 Jun 2018 10:27):    Numerous Klebsiella pneumoniae    Normal Respiratory Dariana absent      RADIOLOGY: No new reports.      PHYSICAL EXAM:  GENERAL: NAD, well-developed, AAOx3.  HEENT:  Atraumatic, Normocephalic. EOMI, PERRLA, conjunctiva and sclera clear.  PULMONARY: Diffuse, course lung sounds BL with limited end-expiratory wheezes appreciated BL.  CARDIOVASCULAR: Regular rate and rhythm; No murmurs, rubs, or gallops  GASTROINTESTINAL: Soft, Nontender, Nondistended; Bowel sounds present  MUSCULOSKELETAL:  1+ edema in LE BL.  SKIN: Left arm ecchymosis and mild erythema that is painful and mildly warm to touch without induration; Right arm with mild erythema and painful to touch without induration.    ADMISSION SUMMARY  46 y/o F with significant PMH of Lupus, CHF s/p AICD+PPM, DVT on xarelto, COPD, RA, laryngeal cancer s/p excision in november, s/p 2 week history of esophageal thrush 4 weeks ago status here with laryngeal candidiasis (04 Jun 2018 20:08) which failed outpatient PO antifungal treatment. Course complicated by acute renal failure, which has since improved. Later had laryngeal edema identified treated empirically with IV steroid which improved. Course later complicated by acute exacerbation of underlying COPD; lung sounds still course diffusely BL. Now course complicated by sepsis secondary to LUE cellulitis and adjacent superficial thrombophlebitis.     1. Sepsis 2/2 Non suppurative superficial cephalic vein thrombophlebitis forearms bilaterally - Left upper extremity erythema unchanged from yesterday is warm/painful to touch and not indurated at this time, CXR unremarkable on 06/18/2018.   ·	Tmax:101.7F; WBC 20.39 today; numerous Klebsiella pneumonia in sputum culture.   ·	Warm compress to affected area on left arm; elevation encouraged.  ·	ID consult rec: Discontinue clindamycin; Start Vancomycin 1 gm IV Q12H; Send for blood culture; Could discharge on Zyvox 600mg Q12H  x7 days if blood cultures are negative.  ·	Ordering CXR for today; numerous Klebsiella pneumonia in sputum culture..    2. COPD exacerbation - Continues to have course breath sounds with improved wheezing BL; able to ambulate without difficulty.  ·	Pulmonary consult rec: Prednisone 40mg QD PO for 5 days, than 20mg QD PO for 5 days; Duonebs Q4H + PRN; BREO (home prescription); Follow up with pulm as outpatient.   ·	Peak flow today was 200; will check again today.  ·	Advised on smoking cessation.     3. Laryngeal Candidiasis, s/p EGD (05/15/2018) - Improving  ·	IV micafungin continued; Pending ID recommendation for antifungal therapy upon discharge.    4. N/V and abdominal pain - Decreased appetite and food intake.  ·	 Pt reports normal BM.  ·	 Zofran PRN    5. TIAN on CKD likely 2/2 prerenal causes (resolved)  ·	 Amphotericin was discontinued  ·	 Monitoring BMP daily  ·	 Prior urine studies showed FeNa 1.3, suggestive of intrinsic renal injury    6. Throat Cancer s/p excision (Nov 2017) - as per pt she was put on CellCept for her LUPUS 2 years ago by her Rheumatologist  and stopped taking it in Feb2018. She said she is not on any other chemotherapy.   ·	Heme/onc consult rec: f/u with her doctor outpatient; pt was seen by Heme/Onc () on last admission.    7. CHF s/p AICD + PPM:   ·	Echo done 6/7 showed LVEF 65%     8. Acute DVT  ·	On Xarelto.    9. HTN (stable)  ·	 Toprol  ·	 Hydralazine    10. RA (controlled)  ·	Outpt benlysta + methotrexate shots  ·	Folic acid supplementation    11. Chronic Pain 2/2 Multiple Back Sx  ·	 MS contin   ·	 Dilaudid PRN  ·	 Gabapentin    12. Anxiety  ·	 Xanax PRN per home dosing  ·	 trazodone    13. Hyperkalemia (resolved)    DVT PPX: back on home NOAC.  GI PPx: protonix.  Dispo: Home after medically stable.    Present today:  (Y) Congestive Heart Failure, Yes? ( )Acute / ( )Acute on Chronic / (X)Chronic  :  ( )Systolic / (X)Diastolic               Plan: Follow up with cardiologist for further management as outpatient.  (?) Complicated Pneumonia, Type?  ( )Parapneumonic effusion / ( )Abscess / ( ) Multilobar / ( )Other               Plan: Suspected, will consider CXR if patient does not improve.    (X) Discussion with patient and/or family regarding goals of care  ( ) Discussed Case and Plan with Medical Attending, Name:    Planned Disposition: Discharge home when medical condition is optimized. ILEANA MCNEIL 47y Female  MRN#: [id number]    SUBJECTIVE  Patient is a 47y old Female who presents with a chief complaint of worsening laryngeal candidiasis (04 Jun 2018 20:08)  Currently admitted to medicine with the primary diagnosis of Candida esophagitis  Hospital course has been complicated by phlebitis and suspected cellulitis in L arm.   Today is hospital day 18d, and this morning she reports she is not feeling better. Overnight she complained of subjective fever and chills.     This morning, she is still having SOB with a nonproductive cough. She is also experiencing decreased appetite with associated nausea without vomit.     Present Today:           Rodarte Catheter (x)No/ ()Yes? Indication:          Central Line (x)No/ ()Yes? Indication:          IV Fluids (x)No/ ()Yes? Type:  Rate:  Indication:      OBJECTIVE  PAST MEDICAL & SURGICAL HISTORY  DVT (deep venous thrombosis)  COPD (chronic obstructive pulmonary disease)  CHF (congestive heart failure)  HTN (hypertension)  RA (rheumatoid arthritis)  Throat cancer  AICD (automatic cardioverter/defibrillator) present  Lupus  History of laryngeal cancer  S/P cholecystectomy  S/P appendectomy    ALLERGIES:  Avelox (Other)  Plaquenil (Other)  Vantin (Other (Mild))    MEDICATIONS:  STANDING MEDICATIONS  ALBUTerol/ipratropium for Nebulization 3 milliLiter(s) Nebulizer every 6 hours  ALPRAZolam 2 milliGRAM(s) Oral three times a day  bismuth subsalicylate Liquid 30 milliLiter(s) Oral daily  clindamycin IVPB      clindamycin IVPB 600 milliGRAM(s) IV Intermittent every 8 hours  diphenhydrAMINE   Injectable 25 milliGRAM(s) IV Push once  docusate sodium 100 milliGRAM(s) Oral three times a day  fluticasone furoate/vilanterol 100-25 MICROgram(s) Inhaler 1 Puff(s) Inhalation daily  folic acid 1 milliGRAM(s) Oral daily  gabapentin 200 milliGRAM(s) Oral two times a day  hydrALAZINE 50 milliGRAM(s) Oral every 12 hours  hydrOXYzine hydrochloride 25 milliGRAM(s) Oral two times a day  lactobacillus acidophilus 1 Tablet(s) Oral daily  metoclopramide 10 milliGRAM(s) Oral three times a day before meals  metoprolol succinate ER 25 milliGRAM(s) Oral daily  micafungin IVPB 100 milliGRAM(s) IV Intermittent every 24 hours  morphine ER Tablet 200 milliGRAM(s) Oral every 8 hours  NIFEdipine XL 30 milliGRAM(s) Oral <User Schedule>  nystatin    Suspension 044322 Unit(s) Oral four times a day  pantoprazole    Tablet 40 milliGRAM(s) Oral before breakfast  predniSONE   Tablet 40 milliGRAM(s) Oral daily  rivaroxaban 20 milliGRAM(s) Oral every 24 hours  senna 2 Tablet(s) Oral at bedtime  traZODone 100 milliGRAM(s) Oral at bedtime    PRN MEDICATIONS  acetaminophen   Tablet 650 milliGRAM(s) Oral every 4 hours PRN  guaiFENesin/dextromethorphan  Syrup 10 milliLiter(s) Oral every 6 hours PRN  ondansetron Injectable 4 milliGRAM(s) IV Push every 6 hours PRN      VITAL SIGNS: Last 24 Hours  T(C): 38.7 (22 Jun 2018 02:23), Max: 38.7 (22 Jun 2018 02:23)  T(F): 101.7 (22 Jun 2018 02:23), Max: 101.7 (22 Jun 2018 02:23)  HR: 112 (22 Jun 2018 02:23) (87 - 112)  BP: 166/84 (22 Jun 2018 02:23) (138/63 - 177/102)  BP(mean): --  RR: 18 (22 Jun 2018 02:23) (18 - 20)  SpO2: 98% (21 Jun 2018 10:54) (98% - 98%)    LABS:  CBC Full  -  ( 22 Jun 2018 06:23 )  WBC Count : 20.39 K/uL  Hemoglobin : 12.5 g/dL  Hematocrit : 38.0 %  Platelet Count - Automated : 283 K/uL  Mean Cell Volume : 83.9 fL  Mean Cell Hemoglobin : 27.6 pg  Mean Cell Hemoglobin Concentration : 32.9 g/dL    (06/22/2018)  135  |  94<L>  |  16  ----------------------------<  162<H>  3.9   |  24  |  1.0      Culture - Sputum (collected 19 Jun 2018 23:00)  Source: .Sputum Sputum  Gram Stain (20 Jun 2018 14:50):    Moderate polymorphonuclear leukocytes seen per low power field    Few Squamous epithelial cells seen per low power field    Numerous Gram Positive Cocci seen per oil power field    Moderate Gram Negative Rods seen per oil power field    Preliminary Report (21 Jun 2018 10:27):    Numerous Klebsiella pneumoniae    Normal Respiratory Dariana absent      RADIOLOGY: Pending CXR PA/LAT.      PHYSICAL EXAM:  GENERAL: NAD, well-developed, AAOx3.  HEENT:  Atraumatic, Normocephalic. EOMI, PERRLA, conjunctiva and sclera clear.  PULMONARY: Diffuse, course lung sounds BL with limited end-expiratory wheezes appreciated BL.  CARDIOVASCULAR: Regular rate and rhythm; No murmurs, rubs, or gallops  GASTROINTESTINAL: Soft, Nontender, Nondistended; Bowel sounds present  MUSCULOSKELETAL:  1+ edema in LE BL.  SKIN: Left arm ecchymosis and mild erythema that is painful and mildly warm to touch without induration; Right arm with mild erythema and painful to touch without induration.    ADMISSION SUMMARY  48 y/o F with significant PMH of Lupus, CHF s/p AICD+PPM, DVT on xarelto, COPD, RA, laryngeal cancer s/p excision in november, s/p 2 week history of esophageal thrush 4 weeks ago status here with laryngeal candidiasis (04 Jun 2018 20:08) which failed outpatient PO antifungal treatment. Course complicated by acute renal failure, which has since improved. Later had laryngeal edema identified treated empirically with IV steroid which improved. Course later complicated by acute exacerbation of underlying COPD; lung sounds still course diffusely BL. Now course complicated by sepsis secondary to LUE cellulitis and adjacent superficial thrombophlebitis. Patient has no IV access and refuses further placement.    1. Sepsis 2/2 Non suppurative superficial cephalic vein thrombophlebitis forearms bilaterally - Left upper extremity erythema unchanged from yesterday is warm/painful to touch and not indurated at this time, CXR unremarkable on 06/18/2018.   ·	Tmax:101.7F; WBC 20.39 today; numerous Klebsiella pneumonia in sputum culture.   ·	Warm compress to affected area on left arm; elevation encouraged.  ·	ID consult rec: Discontinue clindamycin; Send for blood culture; Could discharge on Zyvox 600mg PO Q12H x7 days.  ·	Ordering CXR for today; numerous Klebsiella pneumonia in sputum culture..    2. COPD exacerbation - Continues to have course breath sounds with improved wheezing BL; able to ambulate without difficulty.  ·	Pulmonary consult rec: Prednisone 40mg QD PO for 5 days (first dose received today), than 20mg QD PO for 5 days; Duonebs Q4H + PRN; BREO (home prescription); Follow up with pulm as outpatient.   ·	Peak flow today was 200; will check again today.  ·	Advised on smoking cessation.     3. Laryngeal Candidiasis, s/p EGD (05/15/2018) - Improving  ·	IV micafungin continued.  ·	Posaconzole po 300 mg q12 for 24h then change to 300 mg po q24h for 10 days.  ·	ENT consult rec: Pt needs OP F/U for a barium and exercises.  	-Plan to follow up with ENT scope for further evaluation once patient symptoms return to baseline.     4. N/V and abdominal pain - Decreased appetite and food intake.  ·	 Pt reports normal BM.  ·	 Zofran PRN    5. TIAN on CKD likely 2/2 prerenal causes (resolved)  ·	 Amphotericin was discontinued  ·	 Monitoring BMP daily  ·	 Prior urine studies showed FeNa 1.3, suggestive of intrinsic renal injury    6. Throat Cancer s/p excision (Nov 2017) - as per pt she was put on CellCept for her LUPUS 2 years ago by her Rheumatologist  and stopped taking it in Feb2018. She said she is not on any other chemotherapy.   ·	Heme/onc consult rec: f/u with her doctor outpatient; pt was seen by Heme/Onc () on last admission.    7. CHF s/p AICD + PPM:   ·	Echo done 6/7 showed LVEF 65%     8. Acute DVT  ·	On Xarelto.    9. HTN (stable)  ·	 Toprol  ·	 Hydralazine    10. RA (controlled)  ·	Outpt benlysta + methotrexate shots  ·	Folic acid supplementation    11. Chronic Pain 2/2 Multiple Back Sx  ·	 MS contin   ·	 Dilaudid PRN  ·	 Gabapentin    12. Anxiety  ·	 Xanax PRN per home dosing  ·	 trazodone    13. Hyperkalemia (resolved)    DVT PPX: back on home NOAC.  GI PPx: protonix.  Dispo: Home after medically stable.    Present today:  (Y) Congestive Heart Failure, Yes? ( )Acute / ( )Acute on Chronic / (X)Chronic  :  ( )Systolic / (X)Diastolic               Plan: Follow up with cardiologist for further management as outpatient.  (?) Complicated Pneumonia, Type?  ( )Parapneumonic effusion / ( )Abscess / ( ) Multilobar / ( )Other               Plan: Suspected, will consider CXR if patient does not improve.    (X) Discussion with patient and/or family regarding goals of care  ( ) Discussed Case and Plan with Medical Attending, Name:    Planned Disposition: Discharge home when medical condition is optimized. ILEANA MCNEIL 47y Female  MRN#: [id number]    SUBJECTIVE  Patient is a 47y old Female who presents with a chief complaint of worsening laryngeal candidiasis (04 Jun 2018 20:08)  Currently admitted to medicine with the primary diagnosis of Candida esophagitis  Hospital course has been complicated by phlebitis and suspected cellulitis in L arm.   Today is hospital day 18d, and this morning she reports she is not feeling better. Overnight she complained of subjective fever and chills.     This morning, she is still having SOB with a nonproductive cough. She is also experiencing decreased appetite with associated nausea without vomit.     Present Today:           Rodarte Catheter (x)No/ ()Yes? Indication:          Central Line (x)No/ ()Yes? Indication:          IV Fluids (x)No/ ()Yes? Type:  Rate:  Indication:      OBJECTIVE  PAST MEDICAL & SURGICAL HISTORY  DVT (deep venous thrombosis)  COPD (chronic obstructive pulmonary disease)  CHF (congestive heart failure)  HTN (hypertension)  RA (rheumatoid arthritis)  Throat cancer  AICD (automatic cardioverter/defibrillator) present  Lupus  History of laryngeal cancer  S/P cholecystectomy  S/P appendectomy    ALLERGIES:  Avelox (Other)  Plaquenil (Other)  Vantin (Other (Mild))    MEDICATIONS:  STANDING MEDICATIONS  ALBUTerol/ipratropium for Nebulization 3 milliLiter(s) Nebulizer every 6 hours  ALPRAZolam 2 milliGRAM(s) Oral three times a day  bismuth subsalicylate Liquid 30 milliLiter(s) Oral daily  clindamycin IVPB      clindamycin IVPB 600 milliGRAM(s) IV Intermittent every 8 hours  diphenhydrAMINE   Injectable 25 milliGRAM(s) IV Push once  docusate sodium 100 milliGRAM(s) Oral three times a day  fluticasone furoate/vilanterol 100-25 MICROgram(s) Inhaler 1 Puff(s) Inhalation daily  folic acid 1 milliGRAM(s) Oral daily  gabapentin 200 milliGRAM(s) Oral two times a day  hydrALAZINE 50 milliGRAM(s) Oral every 12 hours  hydrOXYzine hydrochloride 25 milliGRAM(s) Oral two times a day  lactobacillus acidophilus 1 Tablet(s) Oral daily  metoclopramide 10 milliGRAM(s) Oral three times a day before meals  metoprolol succinate ER 25 milliGRAM(s) Oral daily  micafungin IVPB 100 milliGRAM(s) IV Intermittent every 24 hours  morphine ER Tablet 200 milliGRAM(s) Oral every 8 hours  NIFEdipine XL 30 milliGRAM(s) Oral <User Schedule>  nystatin    Suspension 153562 Unit(s) Oral four times a day  pantoprazole    Tablet 40 milliGRAM(s) Oral before breakfast  predniSONE   Tablet 40 milliGRAM(s) Oral daily  rivaroxaban 20 milliGRAM(s) Oral every 24 hours  senna 2 Tablet(s) Oral at bedtime  traZODone 100 milliGRAM(s) Oral at bedtime    PRN MEDICATIONS  acetaminophen   Tablet 650 milliGRAM(s) Oral every 4 hours PRN  guaiFENesin/dextromethorphan  Syrup 10 milliLiter(s) Oral every 6 hours PRN  ondansetron Injectable 4 milliGRAM(s) IV Push every 6 hours PRN      VITAL SIGNS: Last 24 Hours  T(C): 38.7 (22 Jun 2018 02:23), Max: 38.7 (22 Jun 2018 02:23)  T(F): 101.7 (22 Jun 2018 02:23), Max: 101.7 (22 Jun 2018 02:23)  HR: 112 (22 Jun 2018 02:23) (87 - 112)  BP: 166/84 (22 Jun 2018 02:23) (138/63 - 177/102)  BP(mean): --  RR: 18 (22 Jun 2018 02:23) (18 - 20)  SpO2: 98% (21 Jun 2018 10:54) (98% - 98%)    LABS:  CBC Full  -  ( 22 Jun 2018 06:23 )  WBC Count : 20.39 K/uL  Hemoglobin : 12.5 g/dL  Hematocrit : 38.0 %  Platelet Count - Automated : 283 K/uL  Mean Cell Volume : 83.9 fL  Mean Cell Hemoglobin : 27.6 pg  Mean Cell Hemoglobin Concentration : 32.9 g/dL    (06/22/2018)  135  |  94<L>  |  16  ----------------------------<  162<H>  3.9   |  24  |  1.0      Culture - Sputum (collected 19 Jun 2018 23:00)  Source: .Sputum Sputum  Gram Stain (20 Jun 2018 14:50):    Moderate polymorphonuclear leukocytes seen per low power field    Few Squamous epithelial cells seen per low power field    Numerous Gram Positive Cocci seen per oil power field    Moderate Gram Negative Rods seen per oil power field    Preliminary Report (21 Jun 2018 10:27):    Numerous Klebsiella pneumoniae    Normal Respiratory Dariana absent      RADIOLOGY: Pending CXR PA/LAT.      PHYSICAL EXAM:  GENERAL: NAD, well-developed, AAOx3.  HEENT:  Atraumatic, Normocephalic. EOMI, PERRLA, conjunctiva and sclera clear.  PULMONARY: Diffuse, course lung sounds BL with limited end-expiratory wheezes appreciated BL.  CARDIOVASCULAR: Regular rate and rhythm; No murmurs, rubs, or gallops  GASTROINTESTINAL: Soft, Nontender, Nondistended; Bowel sounds present  MUSCULOSKELETAL:  1+ edema in LE BL.  SKIN: Left arm ecchymosis and mild erythema that is painful and mildly warm to touch without induration; Right arm with mild erythema and painful to touch without induration.    ADMISSION SUMMARY  48 y/o F with significant PMH of Lupus, CHF s/p AICD+PPM, DVT on xarelto, COPD, RA, laryngeal cancer s/p excision in november, s/p 2 week history of esophageal thrush 4 weeks ago status here with laryngeal candidiasis (04 Jun 2018 20:08) which failed outpatient PO antifungal treatment. Course complicated by acute renal failure, which has since improved. Later had laryngeal edema identified treated empirically with IV steroid which improved. Course later complicated by acute exacerbation of underlying COPD; lung sounds still course diffusely BL. Now course complicated by sepsis secondary to LUE cellulitis and adjacent superficial thrombophlebitis. Patient has no IV access and refuses further placement.    1. Sepsis 2/2 Non suppurative superficial cephalic vein thrombophlebitis forearms bilaterally - Left upper extremity erythema unchanged from yesterday is warm/painful to touch and not indurated at this time, CXR unremarkable on 06/18/2018.   ·	Tmax:101.7F; WBC 20.39 today; numerous Klebsiella pneumonia in sputum culture.   ·	Warm compress to affected area on left arm; elevation encouraged.  ·	ID consult rec: Discontinue clindamycin; Send for blood culture; Could discharge on Zyvox 600mg PO Q12H x7 days.  ·	Ordering CXR for today; numerous Klebsiella pneumonia in sputum culture..    2. COPD exacerbation - Continues to have course breath sounds with improved wheezing BL; able to ambulate without difficulty.  ·	Pulmonary consult rec: Prednisone 40mg QD PO for 5 days (first dose received today), than 20mg QD PO for 5 days; Duonebs Q4H + PRN; BREO (home prescription); Follow up with pulm as outpatient.   ·	Peak flow today was 200; will check again today.  ·	Advised on smoking cessation.     3. Laryngeal Candidiasis, s/p EGD (05/15/2018) - Improving  ·	IV micafungin continued, and will continue while inpatient; Plan to discharge with posaconozole 300mg QD PO for few days after discharge (already sent to pharmacy).  ·	ENT consult rec: Pt needs OP F/U for a barium and exercises.  	-Plan to follow up with ENT scope for further evaluation once patient symptoms return to baseline.     4. N/V and abdominal pain - Decreased appetite and food intake.  ·	 Pt reports normal BM.  ·	 Zofran PRN    5. TIAN on CKD likely 2/2 prerenal causes (resolved)  ·	 Amphotericin was discontinued  ·	 Monitoring BMP daily  ·	 Prior urine studies showed FeNa 1.3, suggestive of intrinsic renal injury    6. Throat Cancer s/p excision (Nov 2017) - as per pt she was put on CellCept for her LUPUS 2 years ago by her Rheumatologist  and stopped taking it in Feb2018. She said she is not on any other chemotherapy.   ·	Heme/onc consult rec: f/u with her doctor outpatient; pt was seen by Heme/Onc () on last admission.    7. CHF s/p AICD + PPM:   ·	Echo done 6/7 showed LVEF 65%     8. Acute DVT  ·	On Xarelto.    9. HTN (stable)  ·	 Toprol  ·	 Hydralazine    10. RA (controlled)  ·	Outpt benlysta + methotrexate shots  ·	Folic acid supplementation    11. Chronic Pain 2/2 Multiple Back Sx  ·	 MS contin   ·	 Dilaudid PRN  ·	 Gabapentin    12. Anxiety  ·	 Xanax PRN per home dosing  ·	 trazodone    13. Hyperkalemia (resolved)    DVT PPX: back on home NOAC.  GI PPx: protonix.  Dispo: Home after medically stable.    Present today:  (Y) Congestive Heart Failure, Yes? ( )Acute / ( )Acute on Chronic / (X)Chronic  :  ( )Systolic / (X)Diastolic               Plan: Follow up with cardiologist for further management as outpatient.  (?) Complicated Pneumonia, Type?  ( )Parapneumonic effusion / ( )Abscess / ( ) Multilobar / ( )Other               Plan: Suspected, will consider CXR if patient does not improve.    (X) Discussion with patient and/or family regarding goals of care  ( ) Discussed Case and Plan with Medical Attending, Name:    Planned Disposition: Discharge home when medical condition is optimized. ILEANA MCNEIL 47y Female  MRN#: [id number]    SUBJECTIVE  Patient is a 47y old Female who presents with a chief complaint of worsening laryngeal candidiasis (04 Jun 2018 20:08)  Currently admitted to medicine with the primary diagnosis of Candida esophagitis  Hospital course has been complicated by phlebitis and suspected cellulitis in L arm.   Today is hospital day 18d, and this morning she reports she is not feeling better. Overnight she complained of subjective fever and chills.     This morning, she is still having SOB with a nonproductive cough. She is also experiencing decreased appetite with associated nausea without vomit.     Present Today:           Rodarte Catheter (x)No/ ()Yes? Indication:          Central Line (x)No/ ()Yes? Indication:          IV Fluids (x)No/ ()Yes? Type:  Rate:  Indication:    OBJECTIVE  PAST MEDICAL & SURGICAL HISTORY  DVT (deep venous thrombosis)  COPD (chronic obstructive pulmonary disease)  CHF (congestive heart failure)  HTN (hypertension)  RA (rheumatoid arthritis)  Throat cancer  AICD (automatic cardioverter/defibrillator) present  Lupus  History of laryngeal cancer  S/P cholecystectomy  S/P appendectomy    ALLERGIES:  Avelox (Other)  Plaquenil (Other)  Vantin (Other (Mild))    MEDICATIONS:  STANDING MEDICATIONS  ALBUTerol/ipratropium for Nebulization 3 milliLiter(s) Nebulizer every 6 hours  ALPRAZolam 2 milliGRAM(s) Oral three times a day  bismuth subsalicylate Liquid 30 milliLiter(s) Oral daily  clindamycin IVPB      clindamycin IVPB 600 milliGRAM(s) IV Intermittent every 8 hours  diphenhydrAMINE   Injectable 25 milliGRAM(s) IV Push once  docusate sodium 100 milliGRAM(s) Oral three times a day  fluticasone furoate/vilanterol 100-25 MICROgram(s) Inhaler 1 Puff(s) Inhalation daily  folic acid 1 milliGRAM(s) Oral daily  gabapentin 200 milliGRAM(s) Oral two times a day  hydrALAZINE 50 milliGRAM(s) Oral every 12 hours  hydrOXYzine hydrochloride 25 milliGRAM(s) Oral two times a day  lactobacillus acidophilus 1 Tablet(s) Oral daily  metoclopramide 10 milliGRAM(s) Oral three times a day before meals  metoprolol succinate ER 25 milliGRAM(s) Oral daily  micafungin IVPB 100 milliGRAM(s) IV Intermittent every 24 hours  morphine ER Tablet 200 milliGRAM(s) Oral every 8 hours  NIFEdipine XL 30 milliGRAM(s) Oral <User Schedule>  nystatin    Suspension 244447 Unit(s) Oral four times a day  pantoprazole    Tablet 40 milliGRAM(s) Oral before breakfast  predniSONE   Tablet 40 milliGRAM(s) Oral daily  rivaroxaban 20 milliGRAM(s) Oral every 24 hours  senna 2 Tablet(s) Oral at bedtime  traZODone 100 milliGRAM(s) Oral at bedtime    PRN MEDICATIONS  acetaminophen   Tablet 650 milliGRAM(s) Oral every 4 hours PRN  guaiFENesin/dextromethorphan  Syrup 10 milliLiter(s) Oral every 6 hours PRN  ondansetron Injectable 4 milliGRAM(s) IV Push every 6 hours PRN      VITAL SIGNS: Last 24 Hours  T(C): 38.7 (22 Jun 2018 02:23), Max: 38.7 (22 Jun 2018 02:23)  T(F): 101.7 (22 Jun 2018 02:23), Max: 101.7 (22 Jun 2018 02:23)  HR: 112 (22 Jun 2018 02:23) (87 - 112)  BP: 166/84 (22 Jun 2018 02:23) (138/63 - 177/102)  BP(mean): --  RR: 18 (22 Jun 2018 02:23) (18 - 20)  SpO2: 98% (21 Jun 2018 10:54) (98% - 98%)    LABS:  CBC Full  -  ( 22 Jun 2018 06:23 )  WBC Count : 20.39 K/uL  Hemoglobin : 12.5 g/dL  Hematocrit : 38.0 %  Platelet Count - Automated : 283 K/uL  Mean Cell Volume : 83.9 fL  Mean Cell Hemoglobin : 27.6 pg  Mean Cell Hemoglobin Concentration : 32.9 g/dL    (06/22/2018)  135  |  94<L>  |  16  ----------------------------<  162<H>  3.9   |  24  |  1.0      Culture - Sputum (collected 19 Jun 2018 23:00)  Source: .Sputum Sputum  Gram Stain (20 Jun 2018 14:50):    Moderate polymorphonuclear leukocytes seen per low power field    Few Squamous epithelial cells seen per low power field    Numerous Gram Positive Cocci seen per oil power field    Moderate Gram Negative Rods seen per oil power field    Preliminary Report (21 Jun 2018 10:27):    Numerous Klebsiella pneumoniae    Normal Respiratory Dariana absent      RADIOLOGY:   < from: Xray Chest 2 Views PA/Lat (06.22.18 @ 12:12) >    Impression:    Right middle lobe pneumonia.  New.    < end of copied text >      PHYSICAL EXAM:  GENERAL: NAD, well-developed, AAOx3.  HEENT:  Atraumatic, Normocephalic. EOMI, PERRLA, conjunctiva and sclera clear.  PULMONARY: Diffuse, course lung sounds BL with limited end-expiratory wheezes appreciated BL.  CARDIOVASCULAR: Regular rate and rhythm; No murmurs, rubs, or gallops  GASTROINTESTINAL: Soft, Nontender, Nondistended; Bowel sounds present  MUSCULOSKELETAL:  1+ edema in LE BL.  SKIN: Left arm ecchymosis and mild erythema that is painful and mildly warm to touch without induration; Right arm with mild erythema and painful to touch without induration.    ADMISSION SUMMARY  46 y/o F with significant PMH of Lupus, CHF s/p AICD+PPM, DVT on xarelto, COPD, RA, laryngeal cancer s/p excision in november, s/p 2 week history of esophageal thrush 4 weeks ago status here with laryngeal candidiasis (04 Jun 2018 20:08) which failed outpatient PO antifungal treatment. Course complicated by acute renal failure, which has since improved. Later had laryngeal edema identified treated empirically with IV steroid which improved. Course later complicated by acute exacerbation of underlying COPD; lung sounds still course diffusely BL. Now course complicated by sepsis secondary to LUE cellulitis and adjacent superficial thrombophlebitis. Patient has no IV access and refuses further placement.    1. Sepsis 2/2 Non suppurative superficial cephalic vein thrombophlebitis forearms bilaterally - Left upper extremity erythema unchanged from yesterday is warm/painful to touch and not indurated at this time, CXR unremarkable on 06/18/2018.   ·	Tmax:101.7F; WBC 20.39 today; numerous Klebsiella pneumonia in sputum culture.   ·	Warm compress to affected area on left arm; elevation encouraged.  ·	ID consult rec: Discontinue clindamycin; Send for blood culture; Zyvox 600mg PO Q12H x7 days (total); interaction between zyvox + reglan, zofran, trazadone and other drugs that may cause serotonin syndrome; will monitor.  ·	Ordering CXR shows RIGHT middle lobe pneumonia; numerous Klebsiella pneumonia in sputum culture.    2. COPD exacerbation - Continues to have course breath sounds with improved wheezing BL; able to ambulate without difficulty.  ·	Pulmonary consult rec: Prednisone 40mg QD PO for 5 days (first dose received today), than 20mg QD PO for 5 days; Duonebs Q4H + PRN; BREO (home prescription); Follow up with pulm as outpatient.   ·	Peak flow today was 150.  ·	Advised on smoking cessation.     3. Laryngeal Candidiasis, s/p EGD (05/15/2018) - Improving  ·	IV micafungin continued, and will continue while inpatient; Plan to discharge with posaconozole 300mg QD PO for few days after discharge (already sent to pharmacy).  ·	ENT consult rec: Pt needs OP F/U for a barium and exercises.  	-Plan to follow up with ENT scope for further evaluation once patient symptoms return to baseline.     4. N/V and abdominal pain - Decreased appetite and food intake.  ·	 Pt reports normal BM.  ·	 Zofran PRN    5. TIAN on CKD likely 2/2 prerenal causes (resolved)  ·	 Amphotericin was discontinued  ·	 Monitoring BMP daily  ·	 Prior urine studies showed FeNa 1.3, suggestive of intrinsic renal injury    6. Throat Cancer s/p excision (Nov 2017) - as per pt she was put on CellCept for her LUPUS 2 years ago by her Rheumatologist  and stopped taking it in Feb2018. She said she is not on any other chemotherapy.   ·	Heme/onc consult rec: f/u with her doctor outpatient; pt was seen by Heme/Onc () on last admission.    7. CHF s/p AICD + PPM:   ·	Echo done 6/7 showed LVEF 65%     8. Acute DVT  ·	On Xarelto.    9. HTN (stable)  ·	 Toprol  ·	 Hydralazine    10. RA (controlled)  ·	Outpt benlysta + methotrexate shots  ·	Folic acid supplementation    11. Chronic Pain 2/2 Multiple Back Sx  ·	 MS contin   ·	 Dilaudid PRN  ·	 Gabapentin    12. Anxiety  ·	 Xanax PRN per home dosing  ·	 trazodone    13. Hyperkalemia (resolved)    DVT PPX: back on home NOAC.  GI PPx: protonix.  Dispo: Home after medically stable.    Present today:  (Y) Congestive Heart Failure, Yes? ( )Acute / ( )Acute on Chronic / (X)Chronic  :  ( )Systolic / (X)Diastolic               Plan: Follow up with cardiologist for further management as outpatient.  (?) Complicated Pneumonia, Type?  ( )Parapneumonic effusion / ( )Abscess / ( ) Multilobar / ( )Other               Plan: Suspected, will consider CXR if patient does not improve.    (X) Discussion with patient and/or family regarding goals of care  ( ) Discussed Case and Plan with Medical Attending, Name:    Planned Disposition: Discharge home when medical condition is optimized.

## 2018-06-22 NOTE — PROGRESS NOTE ADULT - ADDITIONAL PE
Left forearm with minimal edema. No induration. No palpable cord or erythema. No adenopathy.  Right forearm medially with faint erythema, no cord.

## 2018-06-22 NOTE — CHART NOTE - NSCHARTNOTEFT_GEN_A_CORE
Registered Dietitian Follow-Up     Patient Profile Reviewed                           Yes [x]   No []     Nutrition History Previously Obtained        Yes [x]  No []       Pertinent Subjective Information: Pt. OOB to chair during visit, reports no improvement with appetite at this time. Pt states she feels sick, nausea persist and her stomach feels "sore". Pt. was able to tolerate soft muffin at breakfast today since her mouth candida infection is resolving. Pt. states she been through pureed and than ground and cut up texture since her oral surgery months ago and now is able to tolerate regular texture/consistency, but feels feverish and lacks appetite to eat.       Pertinent Medical Interventions:      Diet order: regular, low Na     Anthropometrics:  - Ht. 160.02cm  - Wt. 77.1kg on 6/22 vs 77.2kg on 6/19- stable   - %wt change  - BMI 29.5  - IBW 110lbs      Pertinent Lab Data: (6/22) WBC 20.39, Cl 94, glu 162     Pertinent Meds: Lactobacillus, Metoprolol, PeptoBismol, Colace, Senna, Folic acid, Reglan, Protonix      Physical Findings:  - Appearance: alert & oriented, 2+ edema to L, R leg  - GI function: reports nausea, abd discomfort  - Tubes: none noted  - Oral/Mouth cavity:  - Skin: intact (BS 21)     Nutrition Requirements (from RD note on 6/19)  Weight Used:     Estimated Energy Needs    Continue [x]  Adjust [] 1545-1686kcal  Adjusted Energy Recommendations:   kcal/day        Estimated Protein Needs    Continue [x]  Adjust [] 52-63g  Adjusted Protein Recommendations:   gm/day        Estimated Fluid Needs        Continue [x]  Adjust [] 1ml/kcal  Adjusted Fluid Recommendations:   mL/day     Nutrient Intake: 0-25% PO at meals per pt.         [] Previous Nutrition Diagnosis: Inadequate oral intake            [x] Ongoing          [] Resolved         Nutrition Intervention: meals and snacks, medical food supplement     Rec: continue regular, low Na diet, order Ensure pudding chocolate TID, consider MVI daily      Goal/Expected Outcome: In 4 days pt. to consume at least 50-75% PO and supplement      Indicator/Monitoring: energy intake, body composition, NFPF Registered Dietitian Follow-Up     Patient Profile Reviewed                           Yes [x]   No []     Nutrition History Previously Obtained        Yes [x]  No []       Pertinent Subjective Information: Pt. OOB to chair during visit, reports no improvement with appetite at this time. Pt states she feels sick, nausea persist and her stomach feels "sore". Pt. was able to tolerate soft muffin at breakfast today since her mouth candida infection is resolving. Pt. states she been through pureed and than ground and cut up texture since her oral surgery months ago and now is able to tolerate regular texture/consistency, but feels feverish and lacks appetite to eat.       Pertinent Medical Interventions:  Sepsis 2/2 Non suppurative superficial cephalic vein thrombophlebitis forearms bilaterally: Warm compress to affected area on left arm; elevation encouraged. ID following. CXR for today; numerous Klebsiella pneumonia in sputum culture. COPD exacerbation: pulm following. Laryngeal Candidiasis- improving. N/V and abdominal pain - Decreased appetite and food intake: Zofran.  TIAN on CKD likely 2/2 prerenal causes (resolved). HTN stable.     Diet order: regular, low Na     Anthropometrics:  - Ht. 160.02cm  - Wt. 77.1kg on 6/22 vs 77.2kg on 6/19- stable   - %wt change  - BMI 29.5  - IBW 110lbs      Pertinent Lab Data: (6/22) WBC 20.39, Cl 94, glu 162     Pertinent Meds: Lactobacillus, Metoprolol, PeptoBismol, Colace, Senna, Folic acid, Reglan, Protonix      Physical Findings:  - Appearance: alert & oriented, 2+ edema to L, R leg  - GI function: reports nausea, abd discomfort, last BM 6/22  - Tubes: none noted  - Oral/Mouth cavity:  - Skin: intact (BS 21)     Nutrition Requirements (from RD note on 6/19)  Weight Used:     Estimated Energy Needs    Continue [x]  Adjust [] 1545-1686kcal  Adjusted Energy Recommendations:   kcal/day        Estimated Protein Needs    Continue [x]  Adjust [] 52-63g  Adjusted Protein Recommendations:   gm/day        Estimated Fluid Needs        Continue [x]  Adjust [] 1ml/kcal  Adjusted Fluid Recommendations:   mL/day     Nutrient Intake: 0-25% PO at meals per pt.         [] Previous Nutrition Diagnosis: Inadequate oral intake            [x] Ongoing          [] Resolved         Nutrition Intervention: meals and snacks, medical food supplement     Rec: continue regular, low Na diet, order Ensure pudding chocolate TID, consider MVI daily      Goal/Expected Outcome: In 4 days pt. to consume at least 50-75% PO and supplement      Indicator/Monitoring: energy intake, body composition, NFPF

## 2018-06-22 NOTE — PROGRESS NOTE ADULT - ASSESSMENT
Impression:  COPD exacerbation  Active smoker  LUE CELLULITIS    Recommendations:    PREDNISONE 40 ONCE DAILY FOR 5 DAYS THAN 20 MG ONCE DAILY FOR 5 DAYS  ABX PER ID  NEB AS NEEDED  PUL STANDPOINT OP F.UP

## 2018-06-22 NOTE — PROGRESS NOTE ADULT - SUBJECTIVE AND OBJECTIVE BOX
ILEANA MCNEIL  47y, Female      OVERNIGHT EVENTS:    pain left forearm and right forearm    VITALS:  T(F): 99.4, Max: 101.7 (06-22-18 @ 02:23)  HR: 90  BP: 132/61  RR: 18Vital Signs Last 24 Hrs  T(C): 37.4 (22 Jun 2018 07:26), Max: 38.7 (22 Jun 2018 02:23)  T(F): 99.4 (22 Jun 2018 07:26), Max: 101.7 (22 Jun 2018 02:23)  HR: 90 (22 Jun 2018 06:07) (87 - 112)  BP: 132/61 (22 Jun 2018 06:07) (132/61 - 166/84)  BP(mean): --  RR: 18 (22 Jun 2018 06:07) (18 - 20)  SpO2: 98% (21 Jun 2018 10:54) (98% - 98%)    TESTS & MEASUREMENTS:                        12.5   20.39 )-----------( 283      ( 22 Jun 2018 06:23 )             38.0     06-22    135  |  94<L>  |  16  ----------------------------<  162<H>  3.9   |  24  |  1.0    Ca    8.8      22 Jun 2018 06:23          Culture - Sputum (collected 06-19-18 @ 23:00)  Source: .Sputum Sputum  Gram Stain (06-20-18 @ 14:50):    Moderate polymorphonuclear leukocytes seen per low power field    Few Squamous epithelial cells seen per low power field    Numerous Gram Positive Cocci seen per oil power field    Moderate Gram Negative Rods seen per oil power field  Preliminary Report (06-21-18 @ 10:27):    Numerous Klebsiella pneumoniae    Normal Respiratory Dariana absent            RADIOLOGY & ADDITIONAL TESTS:    ANTIBIOTICS:  clindamycin IVPB      clindamycin IVPB 600 milliGRAM(s) IV Intermittent every 8 hours  micafungin IVPB 100 milliGRAM(s) IV Intermittent every 24 hours  nystatin    Suspension [id number] Unit(s) Oral four times a day

## 2018-06-22 NOTE — PROGRESS NOTE ADULT - ASSESSMENT
46 y/o F with significant PMH of Lupus, CHF s/p AICD+PPM, DVT on xarelto, COPD, RA, laryngeal cancer s/p excision in november, s/p  2 week history of esophageal thrush 4 weeks ago status here with laryngeal candidiasis which failed outpatient PO antifungal treatment. Course complicated by acute renal failure, which has since improved. Later had laryngeal edema identified treated empirically with IV steroid which improved. Course later complicated by acute exacerbation of underlying COPD which is now improving. Now course complicated by sepsis secondary to LUE cellulitis and adjacent superficial thrombophlebitis    # COPD exacerbation , actively wheezing  - c/w duonebs q4+prn,   titrating to PO steroid  check peak flow  - c/w home breo  - ADVISED ON SMOKING CESSATION.    #Sepsis 2/2 LUE cellulitis r/o PNA  warm compress, elevation  discussed with ID, c/w PO zyvox for now. UE looking better  check CXR r/o infiltrate as sputum Cx growing mixed darvin including klebsiella      # N/V and abdominal pain- not active at this point  discontinued antiemetics as potential interaction with zyvox    # TIAN on CKD likely 2/2 to prerenal as per renal recs- improved now  - amphotericin stopped  - c/w to monitor BMP for Cr,   - urine studies--> FeNa 1.3 (intrinsic renal injury)    # Laryngeal Candidiasis, s/p EGD 5/15/2018  - c/w IV micafungin while inpatient, Itraconazole 200mg PO BID x 7D outpatient as per ID rec  - will clarify with ID prior to D/C for definite course of antifungal therapy upon discharge  - HIV negative (5/10)  - f/u CXR --> no ACS   - f/u blood cultures - negative    # Throat Cancer s/p excision in Nov 2017; as per pt she was put on CellCept for her LUPUS 2 years ago by her Rheumatologist  and stopped taking it in Feb2018. She said she is not on any other chemotherapy.   - Heme/onc consult ordered --> f/u with her doctor outpatient; pt was seen by Heme/Onc () on last admission.      # chronic diastolic HF s/p AICD+PPM:   - echo done 6/7 showed LVEF 65%     # Acute DVT  - On Xarelto.    # HTN - stable  - c/w toprol,   - d/c hydralazine    # RA - controlled  - c/w outpt benlysta + methotrexate shots  - c/w folic acid supplementation    # Chronic Pain 2/2 Multiple Back Sx  - c/w MS contin   - c/w dilaudid PRN  - c/w gabapentin    # Anxiety  - c/w xanax PRN per home dosing  - c/w trazodone (will monitor for any signs of serotonin syndrome while patient is on both home trazodone and new zyvox)    # hyperkalemia - resolved      DVT PPX: back on home NOAC  GI PPx: protonix  Dispo: Home after medically stable

## 2018-06-22 NOTE — PROGRESS NOTE ADULT - ASSESSMENT
IMPRESSION:  Non suppurative superficial cephalic vein thrombophlebitis forearms bilaterally.    RECOMMENDATIONS:  Warm compressors.  Blood cultures.  D/C Clindamycin.  Vancomycin 1 gm iv q12h.  Could discharge on po Zyvox 600 mg q12h for 7 days of blood cultures are negative.

## 2018-06-23 LAB
ANION GAP SERPL CALC-SCNC: 15 MMOL/L — HIGH (ref 7–14)
BUN SERPL-MCNC: 23 MG/DL — HIGH (ref 10–20)
CALCIUM SERPL-MCNC: 9.3 MG/DL — SIGNIFICANT CHANGE UP (ref 8.5–10.1)
CHLORIDE SERPL-SCNC: 95 MMOL/L — LOW (ref 98–110)
CO2 SERPL-SCNC: 25 MMOL/L — SIGNIFICANT CHANGE UP (ref 17–32)
CREAT SERPL-MCNC: 1 MG/DL — SIGNIFICANT CHANGE UP (ref 0.7–1.5)
GLUCOSE SERPL-MCNC: 128 MG/DL — HIGH (ref 70–99)
HCT VFR BLD CALC: 38.2 % — SIGNIFICANT CHANGE UP (ref 37–47)
HGB BLD-MCNC: 12.5 G/DL — SIGNIFICANT CHANGE UP (ref 12–16)
MCHC RBC-ENTMCNC: 27.7 PG — SIGNIFICANT CHANGE UP (ref 27–31)
MCHC RBC-ENTMCNC: 32.7 G/DL — SIGNIFICANT CHANGE UP (ref 32–37)
MCV RBC AUTO: 84.7 FL — SIGNIFICANT CHANGE UP (ref 81–99)
NRBC # BLD: 0 /100 WBCS — SIGNIFICANT CHANGE UP (ref 0–0)
PLATELET # BLD AUTO: 315 K/UL — SIGNIFICANT CHANGE UP (ref 130–400)
POTASSIUM SERPL-MCNC: 4 MMOL/L — SIGNIFICANT CHANGE UP (ref 3.5–5)
POTASSIUM SERPL-SCNC: 4 MMOL/L — SIGNIFICANT CHANGE UP (ref 3.5–5)
RBC # BLD: 4.51 M/UL — SIGNIFICANT CHANGE UP (ref 4.2–5.4)
RBC # FLD: 13.5 % — SIGNIFICANT CHANGE UP (ref 11.5–14.5)
SODIUM SERPL-SCNC: 135 MMOL/L — SIGNIFICANT CHANGE UP (ref 135–146)
WBC # BLD: 25.66 K/UL — HIGH (ref 4.8–10.8)
WBC # FLD AUTO: 25.66 K/UL — HIGH (ref 4.8–10.8)

## 2018-06-23 RX ORDER — PIPERACILLIN AND TAZOBACTAM 4; .5 G/20ML; G/20ML
3.38 INJECTION, POWDER, LYOPHILIZED, FOR SOLUTION INTRAVENOUS EVERY 8 HOURS
Qty: 0 | Refills: 0 | Status: DISCONTINUED | OUTPATIENT
Start: 2018-06-23 | End: 2018-06-24

## 2018-06-23 RX ORDER — MICAFUNGIN SODIUM 100 MG/1
100 INJECTION, POWDER, LYOPHILIZED, FOR SOLUTION INTRAVENOUS EVERY 24 HOURS
Qty: 0 | Refills: 0 | Status: DISCONTINUED | OUTPATIENT
Start: 2018-06-23 | End: 2018-06-25

## 2018-06-23 RX ORDER — PIPERACILLIN AND TAZOBACTAM 4; .5 G/20ML; G/20ML
3.38 INJECTION, POWDER, LYOPHILIZED, FOR SOLUTION INTRAVENOUS ONCE
Qty: 0 | Refills: 0 | Status: COMPLETED | OUTPATIENT
Start: 2018-06-23 | End: 2018-06-23

## 2018-06-23 RX ADMIN — Medication 2 MILLIGRAM(S): at 15:16

## 2018-06-23 RX ADMIN — MORPHINE SULFATE 200 MILLIGRAM(S): 50 CAPSULE, EXTENDED RELEASE ORAL at 21:56

## 2018-06-23 RX ADMIN — Medication 25 MILLIGRAM(S): at 06:32

## 2018-06-23 RX ADMIN — Medication 50 MILLIGRAM(S): at 06:33

## 2018-06-23 RX ADMIN — Medication 30 MILLILITER(S): at 13:02

## 2018-06-23 RX ADMIN — Medication 40 MILLIGRAM(S): at 06:33

## 2018-06-23 RX ADMIN — Medication 2 MILLIGRAM(S): at 21:56

## 2018-06-23 RX ADMIN — PANTOPRAZOLE SODIUM 40 MILLIGRAM(S): 20 TABLET, DELAYED RELEASE ORAL at 08:01

## 2018-06-23 RX ADMIN — FLUTICASONE FUROATE AND VILANTEROL TRIFENATATE 1 PUFF(S): 100; 25 POWDER RESPIRATORY (INHALATION) at 12:59

## 2018-06-23 RX ADMIN — Medication 1 MILLIGRAM(S): at 12:58

## 2018-06-23 RX ADMIN — Medication 500000 UNIT(S): at 18:23

## 2018-06-23 RX ADMIN — Medication 25 MILLIGRAM(S): at 18:22

## 2018-06-23 RX ADMIN — MORPHINE SULFATE 200 MILLIGRAM(S): 50 CAPSULE, EXTENDED RELEASE ORAL at 16:10

## 2018-06-23 RX ADMIN — MORPHINE SULFATE 200 MILLIGRAM(S): 50 CAPSULE, EXTENDED RELEASE ORAL at 23:11

## 2018-06-23 RX ADMIN — Medication 30 MILLIGRAM(S): at 12:58

## 2018-06-23 RX ADMIN — Medication 500000 UNIT(S): at 12:59

## 2018-06-23 RX ADMIN — Medication 3 MILLILITER(S): at 07:22

## 2018-06-23 RX ADMIN — Medication 1 TABLET(S): at 12:58

## 2018-06-23 RX ADMIN — Medication 100 MILLIGRAM(S): at 21:57

## 2018-06-23 RX ADMIN — Medication 25 MILLIGRAM(S): at 06:33

## 2018-06-23 RX ADMIN — Medication 3 MILLILITER(S): at 13:28

## 2018-06-23 RX ADMIN — MORPHINE SULFATE 200 MILLIGRAM(S): 50 CAPSULE, EXTENDED RELEASE ORAL at 06:32

## 2018-06-23 RX ADMIN — Medication 2 MILLIGRAM(S): at 06:32

## 2018-06-23 RX ADMIN — Medication 50 MILLIGRAM(S): at 18:22

## 2018-06-23 RX ADMIN — MORPHINE SULFATE 200 MILLIGRAM(S): 50 CAPSULE, EXTENDED RELEASE ORAL at 15:15

## 2018-06-23 RX ADMIN — GABAPENTIN 200 MILLIGRAM(S): 400 CAPSULE ORAL at 18:22

## 2018-06-23 RX ADMIN — LINEZOLID 600 MILLIGRAM(S): 600 INJECTION, SOLUTION INTRAVENOUS at 06:33

## 2018-06-23 RX ADMIN — GABAPENTIN 200 MILLIGRAM(S): 400 CAPSULE ORAL at 06:33

## 2018-06-23 RX ADMIN — RIVAROXABAN 20 MILLIGRAM(S): KIT at 18:22

## 2018-06-23 RX ADMIN — Medication 500000 UNIT(S): at 06:35

## 2018-06-23 NOTE — PROGRESS NOTE ADULT - ASSESSMENT
46 y/o F with significant PMH of Lupus, CHF s/p AICD+PPM, DVT on xarelto, COPD, RA, laryngeal cancer s/p excision in november, s/p  2 week history of esophageal thrush 4 weeks ago status here with laryngeal candidiasis which failed outpatient PO antifungal treatment. Course complicated by acute renal failure, which has since improved. Later had laryngeal edema identified treated empirically with IV steroid which improved. Course later complicated by acute exacerbation of underlying COPD which is now improving. Now course complicated by sepsis secondary to LUE cellulitis and adjacent superficial thrombophlebitis    # COPD exacerbation , actively wheezing  - c/w duonebs q4+prn,   c/w PO steroid  check peak flow  - c/w home breo  - ADVISED ON SMOKING CESSATION.    #Sepsis 2/2 Klebsiella right middle lobar pneumonia (gram negative sepsis)  discussed with patient and ID  patient with qtc prolongation secondary to moxifloxacin previously  patient with anaphylaxis to vantin previously but has tolerated penicillins well in the past  will cover with zosyn for now  doubting thrombophlebitis is an actual nidus of cellulitis as site had improved markedly with elevation and warm compress, stopping linezolid  trend for fever curve and WBC    # N/V and abdominal pain- not active at this point  discontinued antiemetics as potential interaction with zyvox    # TIAN on CKD likely 2/2 to prerenal as per renal recs- improved now  - amphotericin stopped  - c/w to monitor BMP for Cr,   - urine studies--> FeNa 1.3 (intrinsic renal injury)    # Laryngeal Candidiasis, s/p EGD 5/15/2018  - c/w IV micafungin while inpatient, Itraconazole 200mg PO BID x 7D outpatient as per ID rec  - will clarify with ID prior to D/C for definite course of antifungal therapy upon discharge  - HIV negative (5/10)  - f/u CXR --> no ACS   - f/u blood cultures - negative    # Throat Cancer s/p excision in Nov 2017; as per pt she was put on CellCept for her LUPUS 2 years ago by her Rheumatologist  and stopped taking it in Feb2018. She said she is not on any other chemotherapy.   - Heme/onc consult ordered --> f/u with her doctor outpatient; pt was seen by Heme/Onc () on last admission.      # chronic diastolic HF s/p AICD+PPM:   - echo done 6/7 showed LVEF 65%     # Acute DVT  - On Xarelto.    # HTN - stable  - c/w toprol,   - d/c hydralazine    # RA - controlled  - c/w outpt benlysta + methotrexate shots  - c/w folic acid supplementation    # Chronic Pain 2/2 Multiple Back Sx  - c/w MS contin   - c/w dilaudid PRN  - c/w gabapentin    # Anxiety  - c/w xanax PRN per home dosing  - c/w trazodone    # hyperkalemia - resolved      DVT PPX: back on home NOAC  GI PPx: protonix  Dispo: Home after medically stable

## 2018-06-23 NOTE — PROGRESS NOTE ADULT - SUBJECTIVE AND OBJECTIVE BOX
ILEANA MCNEIL  47y  Female      Patient is a 47y old  Female who presents with a chief complaint of worsening laryngeal candidiasis (04 Jun 2018 20:08)      INTERVAL HPI/OVERNIGHT EVENTS: c/o cough and weakness. erythema in b/l forearms has completely subsided but patient does endorse some tenderness to touch      REVIEW OF SYSTEMS:  as above  All other review of systems negative    T(C): 36.4 (06-23-18 @ 12:52), Max: 36.8 (06-23-18 @ 04:32)  HR: 99 (06-23-18 @ 12:52) (90 - 99)  BP: 152/74 (06-23-18 @ 12:52) (111/55 - 160/79)  RR: 20 (06-23-18 @ 12:52) (18 - 20)  SpO2: 93% (06-23-18 @ 09:24) (93% - 93%)  Wt(kg): --Vital Signs Last 24 Hrs  T(C): 36.4 (23 Jun 2018 12:52), Max: 36.8 (23 Jun 2018 04:32)  T(F): 97.6 (23 Jun 2018 12:52), Max: 98.3 (23 Jun 2018 04:32)  HR: 99 (23 Jun 2018 12:52) (90 - 99)  BP: 152/74 (23 Jun 2018 12:52) (111/55 - 160/79)  BP(mean): --  RR: 20 (23 Jun 2018 12:52) (18 - 20)  SpO2: 93% (23 Jun 2018 09:24) (93% - 93%)        PHYSICAL EXAM:  GENERAL: NAD  PSYCH: no agitation, baseline mentation  HEENT: hoarse  NERVOUS SYSTEM:  Alert & Oriented X3, no new focal deficits  PULMONARY: diffuse expiratory wheezing and rhonchi  CARDIOVASCULAR: Regular rate and rhythm; No murmurs, rubs, or gallops  GI: Soft, Nontender, Nondistended; Bowel sounds present  EXTREMITIES:  2+ Peripheral Pulses, No clubbing, cyanosis, or edema, b/l forearm erythema resolved, some bruising    Consultant(s) Notes Reviewed:  [x ] YES  [ ] NO    Discussed with Consultants/Other Providers [ x] YES     LABS                          12.5   25.66 )-----------( 315      ( 23 Jun 2018 07:22 )             38.2     06-23    135  |  95<L>  |  23<H>  ----------------------------<  128<H>  4.0   |  25  |  1.0    Ca    9.3      23 Jun 2018 07:22            Lactate Trend        CAPILLARY BLOOD GLUCOSE            RADIOLOGY & ADDITIONAL TESTS:    Imaging Personally Reviewed:  [ ] YES  [ ] NO    HEALTH ISSUES - PROBLEM Dx:

## 2018-06-24 LAB
ANION GAP SERPL CALC-SCNC: 18 MMOL/L — HIGH (ref 7–14)
BASOPHILS # BLD AUTO: 0.03 K/UL — SIGNIFICANT CHANGE UP (ref 0–0.2)
BASOPHILS NFR BLD AUTO: 0.1 % — SIGNIFICANT CHANGE UP (ref 0–1)
BUN SERPL-MCNC: 23 MG/DL — HIGH (ref 10–20)
CALCIUM SERPL-MCNC: 9.6 MG/DL — SIGNIFICANT CHANGE UP (ref 8.5–10.1)
CHLORIDE SERPL-SCNC: 98 MMOL/L — SIGNIFICANT CHANGE UP (ref 98–110)
CO2 SERPL-SCNC: 25 MMOL/L — SIGNIFICANT CHANGE UP (ref 17–32)
CREAT SERPL-MCNC: 1.1 MG/DL — SIGNIFICANT CHANGE UP (ref 0.7–1.5)
EOSINOPHIL # BLD AUTO: 0.13 K/UL — SIGNIFICANT CHANGE UP (ref 0–0.7)
EOSINOPHIL NFR BLD AUTO: 0.6 % — SIGNIFICANT CHANGE UP (ref 0–8)
GLUCOSE SERPL-MCNC: 118 MG/DL — HIGH (ref 70–99)
HCT VFR BLD CALC: 37.6 % — SIGNIFICANT CHANGE UP (ref 37–47)
HGB BLD-MCNC: 12.2 G/DL — SIGNIFICANT CHANGE UP (ref 12–16)
IMM GRANULOCYTES NFR BLD AUTO: 1.1 % — HIGH (ref 0.1–0.3)
LYMPHOCYTES # BLD AUTO: 24 % — SIGNIFICANT CHANGE UP (ref 20.5–51.1)
LYMPHOCYTES # BLD AUTO: 5 K/UL — HIGH (ref 1.2–3.4)
MCHC RBC-ENTMCNC: 27.8 PG — SIGNIFICANT CHANGE UP (ref 27–31)
MCHC RBC-ENTMCNC: 32.4 G/DL — SIGNIFICANT CHANGE UP (ref 32–37)
MCV RBC AUTO: 85.6 FL — SIGNIFICANT CHANGE UP (ref 81–99)
MONOCYTES # BLD AUTO: 1.6 K/UL — HIGH (ref 0.1–0.6)
MONOCYTES NFR BLD AUTO: 7.7 % — SIGNIFICANT CHANGE UP (ref 1.7–9.3)
NEUTROPHILS # BLD AUTO: 13.81 K/UL — HIGH (ref 1.4–6.5)
NEUTROPHILS NFR BLD AUTO: 66.5 % — SIGNIFICANT CHANGE UP (ref 42.2–75.2)
NRBC # BLD: 0 /100 WBCS — SIGNIFICANT CHANGE UP (ref 0–0)
PLATELET # BLD AUTO: 306 K/UL — SIGNIFICANT CHANGE UP (ref 130–400)
POTASSIUM SERPL-MCNC: 3.9 MMOL/L — SIGNIFICANT CHANGE UP (ref 3.5–5)
POTASSIUM SERPL-SCNC: 3.9 MMOL/L — SIGNIFICANT CHANGE UP (ref 3.5–5)
RBC # BLD: 4.39 M/UL — SIGNIFICANT CHANGE UP (ref 4.2–5.4)
RBC # FLD: 13.6 % — SIGNIFICANT CHANGE UP (ref 11.5–14.5)
SODIUM SERPL-SCNC: 141 MMOL/L — SIGNIFICANT CHANGE UP (ref 135–146)
WBC # BLD: 20.8 K/UL — HIGH (ref 4.8–10.8)
WBC # FLD AUTO: 20.8 K/UL — HIGH (ref 4.8–10.8)

## 2018-06-24 RX ADMIN — Medication 40 MILLIGRAM(S): at 06:04

## 2018-06-24 RX ADMIN — MORPHINE SULFATE 200 MILLIGRAM(S): 50 CAPSULE, EXTENDED RELEASE ORAL at 14:55

## 2018-06-24 RX ADMIN — Medication 30 MILLIGRAM(S): at 11:32

## 2018-06-24 RX ADMIN — Medication 1 TABLET(S): at 18:03

## 2018-06-24 RX ADMIN — Medication 50 MILLIGRAM(S): at 06:04

## 2018-06-24 RX ADMIN — MORPHINE SULFATE 200 MILLIGRAM(S): 50 CAPSULE, EXTENDED RELEASE ORAL at 14:12

## 2018-06-24 RX ADMIN — Medication 500000 UNIT(S): at 11:32

## 2018-06-24 RX ADMIN — Medication 1 TABLET(S): at 11:32

## 2018-06-24 RX ADMIN — GABAPENTIN 200 MILLIGRAM(S): 400 CAPSULE ORAL at 06:04

## 2018-06-24 RX ADMIN — PANTOPRAZOLE SODIUM 40 MILLIGRAM(S): 20 TABLET, DELAYED RELEASE ORAL at 06:05

## 2018-06-24 RX ADMIN — Medication 2 MILLIGRAM(S): at 06:05

## 2018-06-24 RX ADMIN — Medication 2 MILLIGRAM(S): at 14:12

## 2018-06-24 RX ADMIN — PIPERACILLIN AND TAZOBACTAM 200 GRAM(S): 4; .5 INJECTION, POWDER, LYOPHILIZED, FOR SOLUTION INTRAVENOUS at 06:28

## 2018-06-24 RX ADMIN — FLUTICASONE FUROATE AND VILANTEROL TRIFENATATE 1 PUFF(S): 100; 25 POWDER RESPIRATORY (INHALATION) at 08:10

## 2018-06-24 RX ADMIN — Medication 25 MILLIGRAM(S): at 18:05

## 2018-06-24 RX ADMIN — Medication 25 MILLIGRAM(S): at 06:05

## 2018-06-24 RX ADMIN — Medication 20 MILLIGRAM(S): at 18:04

## 2018-06-24 RX ADMIN — Medication 2 MILLIGRAM(S): at 22:12

## 2018-06-24 RX ADMIN — Medication 500000 UNIT(S): at 18:04

## 2018-06-24 RX ADMIN — RIVAROXABAN 20 MILLIGRAM(S): KIT at 18:03

## 2018-06-24 RX ADMIN — Medication 500000 UNIT(S): at 06:05

## 2018-06-24 RX ADMIN — Medication 3 MILLILITER(S): at 07:45

## 2018-06-24 RX ADMIN — Medication 100 MILLIGRAM(S): at 22:12

## 2018-06-24 RX ADMIN — MORPHINE SULFATE 200 MILLIGRAM(S): 50 CAPSULE, EXTENDED RELEASE ORAL at 22:12

## 2018-06-24 RX ADMIN — Medication 500000 UNIT(S): at 23:34

## 2018-06-24 RX ADMIN — PIPERACILLIN AND TAZOBACTAM 25 GRAM(S): 4; .5 INJECTION, POWDER, LYOPHILIZED, FOR SOLUTION INTRAVENOUS at 06:06

## 2018-06-24 RX ADMIN — Medication 1 TABLET(S): at 11:31

## 2018-06-24 RX ADMIN — GABAPENTIN 200 MILLIGRAM(S): 400 CAPSULE ORAL at 18:04

## 2018-06-24 RX ADMIN — MORPHINE SULFATE 200 MILLIGRAM(S): 50 CAPSULE, EXTENDED RELEASE ORAL at 22:42

## 2018-06-24 RX ADMIN — MORPHINE SULFATE 200 MILLIGRAM(S): 50 CAPSULE, EXTENDED RELEASE ORAL at 06:28

## 2018-06-24 RX ADMIN — Medication 500000 UNIT(S): at 00:01

## 2018-06-24 RX ADMIN — Medication 50 MILLIGRAM(S): at 18:04

## 2018-06-24 RX ADMIN — Medication 1 MILLIGRAM(S): at 11:32

## 2018-06-24 RX ADMIN — MORPHINE SULFATE 200 MILLIGRAM(S): 50 CAPSULE, EXTENDED RELEASE ORAL at 06:05

## 2018-06-24 NOTE — PROGRESS NOTE ADULT - SUBJECTIVE AND OBJECTIVE BOX
ILEANA MCNEIL  47y  Female      Patient is a 47y old  Female who presents with a chief complaint of worsening laryngeal candidiasis (04 Jun 2018 20:08)      INTERVAL HPI/OVERNIGHT EVENTS: coughing and weak      REVIEW OF SYSTEMS:  as above  All other review of systems negative    T(C): 36.9 (06-24-18 @ 13:21), Max: 37.1 (06-23-18 @ 20:32)  HR: 82 (06-24-18 @ 13:21) (79 - 89)  BP: 131/67 (06-24-18 @ 13:21) (109/59 - 150/72)  RR: 20 (06-24-18 @ 13:21) (20 - 20)  SpO2: 92% (06-24-18 @ 08:10) (92% - 92%)  Wt(kg): --Vital Signs Last 24 Hrs  T(C): 36.9 (24 Jun 2018 13:21), Max: 37.1 (23 Jun 2018 20:32)  T(F): 98.5 (24 Jun 2018 13:21), Max: 98.8 (23 Jun 2018 20:32)  HR: 82 (24 Jun 2018 13:21) (79 - 89)  BP: 131/67 (24 Jun 2018 13:21) (109/59 - 150/72)  BP(mean): --  RR: 20 (24 Jun 2018 13:21) (20 - 20)  SpO2: 92% (24 Jun 2018 08:10) (92% - 92%)        PHYSICAL EXAM:  GENERAL: NAD  PSYCH: no agitation, baseline mentation  NERVOUS SYSTEM:  Alert & Oriented X3, no new focal deficits  HEENT hoarse  PULMONARY: mildly improving expiratory wheezing with subtle right sided rhonchi  CARDIOVASCULAR: Regular rate and rhythm; No murmurs, rubs, or gallops  GI: Soft, Nontender, Nondistended; Bowel sounds present  EXTREMITIES:  2+ Peripheral Pulses, No clubbing, cyanosis, or edema    Consultant(s) Notes Reviewed:  [x ] YES  [ ] NO    Discussed with Consultants/Other Providers [ x] YES     LABS                          12.2   20.80 )-----------( 306      ( 24 Jun 2018 06:31 )             37.6     06-24    141  |  98  |  23<H>  ----------------------------<  118<H>  3.9   |  25  |  1.1    Ca    9.6      24 Jun 2018 06:31            Lactate Trend        CAPILLARY BLOOD GLUCOSE            RADIOLOGY & ADDITIONAL TESTS:    Imaging Personally Reviewed:  [ ] YES  [ ] NO    HEALTH ISSUES - PROBLEM Dx:

## 2018-06-24 NOTE — PROGRESS NOTE ADULT - ASSESSMENT
ILEANA MCNEIL  /   47y  /  Female  /  MRN#: [id number]    CODE STATUS:   CONDITION:     -----------------------------------------------------------------------------------------------------  SUBJECTIVE  Patient is a 47y old Female who presents with a chief complaint of worsening laryngeal candidiasis (04 Jun 2018 20:08)  Currently admitted to medicine with the primary diagnosis of Candida esophagitis    HOSPITAL DAY: 20d   OVERNIGHT EVENTS:     TODAY: Patient was seen this morning at bedside. Currently, the patient reports ....    Review of systems is otherwise negative.    -----------------------------------------------------------------------------------------------------  OBJECTIVE  PHYSICAL EXAM:    VITAL SIGNS: Last 24 Hours  T(C): 36.1 (24 Jun 2018 05:38), Max: 37.1 (23 Jun 2018 20:32)  T(F): 97 (24 Jun 2018 05:38), Max: 98.8 (23 Jun 2018 20:32)  HR: 85 (24 Jun 2018 05:38) (79 - 99)  BP: 109/59 (24 Jun 2018 05:38) (109/59 - 152/74)  BP(mean): --  RR: 20 (24 Jun 2018 05:38) (20 - 20)  SpO2: 93% (23 Jun 2018 09:24) (93% - 93%)    GENERAL: Awake, alert, oriented x3;  HEAD: NC/AT.  ENT: PERRL; EOMI; Conjunctivae pink, Sclera clear; Oral mucosa moist with no edema, erythema, exudates.  NECK: Supple; no JVD.  CARDIO: S1 & S2; RRR; No M/R/G appreciated.  RESP: Equal expansion; Clear to auscultation BL.  GI: Soft; NT/ND; bowel sounds positive x4 quadrants.  : Deferred.  MSK: UE and LE ROM intact; No edema in UE and LE; UE and LE strength 5/5.  NEURO: CNII-XII grossly intact; Light touch, pressure, pinpoint sensation intact.  SKIN: Good turgor; No ecchymosis, petechiae, ulcers, lacerations appreciated.    LABS:                        12.5   25.66 )-----------( 315      ( 23 Jun 2018 07:22 )             38.2     06-23    135  |  95<L>  |  23<H>  ----------------------------<  128<H>  4.0   |  25  |  1.0    Ca    9.3      23 Jun 2018 07:22                Culture - Blood (collected 21 Jun 2018 23:48)  Source: .Blood None  Preliminary Report (23 Jun 2018 06:01):    No growth to date.          RADIOLOGY:    ALLERGIES:  Avelox (Other)  Plaquenil (Other)  Vantin (Other (Mild))    MEDICATIONS:  ALBUTerol/ipratropium for Nebulization 3 milliLiter(s) Nebulizer every 6 hours  ALPRAZolam 2 milliGRAM(s) Oral three times a day  bismuth subsalicylate Liquid 30 milliLiter(s) Oral daily  docusate sodium 100 milliGRAM(s) Oral three times a day  fluticasone furoate/vilanterol 100-25 MICROgram(s) Inhaler 1 Puff(s) Inhalation daily  folic acid 1 milliGRAM(s) Oral daily  gabapentin 200 milliGRAM(s) Oral two times a day  hydrALAZINE 50 milliGRAM(s) Oral every 12 hours  hydrOXYzine hydrochloride 25 milliGRAM(s) Oral two times a day  lactobacillus acidophilus 1 Tablet(s) Oral daily  metoprolol succinate ER 25 milliGRAM(s) Oral daily  micafungin IVPB 100 milliGRAM(s) IV Intermittent every 24 hours  morphine ER Tablet 200 milliGRAM(s) Oral every 8 hours  NIFEdipine XL 30 milliGRAM(s) Oral <User Schedule>  nystatin    Suspension 728513 Unit(s) Oral four times a day  pantoprazole    Tablet 40 milliGRAM(s) Oral before breakfast  piperacillin/tazobactam IVPB. 3.375 Gram(s) IV Intermittent every 8 hours  predniSONE   Tablet 40 milliGRAM(s) Oral daily  rivaroxaban 20 milliGRAM(s) Oral every 24 hours  senna 2 Tablet(s) Oral at bedtime  traZODone 100 milliGRAM(s) Oral at bedtime    PRN MEDICATIONS  acetaminophen   Tablet 650 milliGRAM(s) Oral every 4 hours PRN  guaiFENesin    Syrup 200 milliGRAM(s) Oral every 6 hours PRN    -----------------------------------------------------------------------------------------------------  ASSESSMENT  48 y/o F with significant PMH of Lupus, CHF s/p AICD+PPM, DVT on xarelto, COPD, RA, laryngeal cancer s/p excision in november, s/p  2 week history of esophageal thrush 4 weeks ago status here with laryngeal candidiasis which failed outpatient PO antifungal treatment. Course complicated by acute renal failure, which has since improved. Later had laryngeal edema identified treated empirically with IV steroid which improved. Course later complicated by acute exacerbation of underlying COPD which is now improving. Now course complicated by sepsis secondary to LUE cellulitis and adjacent superficial thrombophlebitis as well as Klebsiella R middle lobe pneumonia.    1. COPD exacerbation, actively wheezing  ·	c/w duonebs q4+prn,   ·	c/w PO steroid  ·	check peak flow  ·	c/w home breo  ·	ADVISED ON SMOKING CESSATION.    2. Sepsis 2/2 Klebsiella right middle lobar pneumonia (gram negative sepsis)  ·	discussed with patient and ID  ·	patient with qtc prolongation secondary to moxifloxacin previously  ·	patient with anaphylaxis to vantin previously but has tolerated penicillins well in the past  ·	will cover with zosyn for now  ·	doubting thrombophlebitis is an actual nidus of cellulitis as site had improved markedly with elevation and warm compress, stopping linezolid  ·	trend for fever curve and WBC    3. N/V and abdominal pain- not active at this point  ·	discontinued antiemetics as potential interaction with zyvox    4. TIAN on CKD likely 2/2 to prerenal as per renal recs- improved now  ·	amphotericin stopped  ·	c/w to monitor BMP for Cr,   ·	urine studies--> FeNa 1.3 (intrinsic renal injury)    5. Laryngeal Candidiasis, s/p EGD 5/15/2018  ·	c/w IV micafungin while inpatient, Itraconazole 200mg PO BID x 7D outpatient as per ID rec  ·	will clarify with ID prior to D/C for definite course of antifungal therapy upon discharge  ·	HIV negative (5/10)  ·	f/u CXR --> no ACS   ·	 f/u blood cultures - negative    6. Throat Cancer s/p excision in Nov 2017; as per pt she was put on CellCept for her LUPUS 2 years ago by her Rheumatologist  and stopped taking it in Feb2018. She said she is not on any other chemotherapy.   ·	Heme/onc consult ordered --> f/u with her doctor outpatient; pt was seen by Heme/Onc () on last admission.    7. Chronic diastolic HF s/p AICD+PPM:   ·	Echo done 6/7 showed LVEF 65%     8. Acute DVT  ·	On Xarelto.    9. HTN - stable  ·	c/w toprol,   ·	d/c hydralazine    10. RA - controlled  ·	c/w outpt benlysta + methotrexate shots  ·	c/w folic acid supplementation    11. Chronic Pain 2/2 Multiple Back Sx  ·	c/w MS contin   ·	c/w dilaudid PRN  ·	c/w gabapentin    12. Anxiety  ·	c/w xanax PRN per home dosing  ·	c/w trazodone    13. hyperkalemia - resolved    GI PPX: Protonix  DVT PPX: Back on home NOAC  DIET:  () Regular  /  () Cardiac  / () Renal  /  () Diabetic  /  () Fluid restriction  /   () NPO  ACTIVITY:  (X) Ad Talita  /  () Advance as Tolerated  /  () Bed Rest  /  () Fall Precaution  /  () Seizure precaution    -----------------------------------------------------------------------------------------------------  Present today:  () Congestive Heart Failure               If yes, ()Acute / ()Acute on Chronic / ()Chronic  :  ()Systolic / ()Diastolic               Plan:    (X) Complicated Pneumonia                 If yes, ()Parapneumonic effusion / ()Abscess / () Multilobar / ()Other               Plan: Resolve with appropriate Abx regimen.    () Obesity / () Underweight,  BMI:               Plan:    () Functional Quadriplegia               Plan:    () Encephalopathy              Plan:    -----------------------------------------------------------------------------------------------------  Disposition:  Patient to be discharged when condition(s) optimized.            Discharge to:  (X) Home  /  () SNF  /  () Hospice  /  () Other            Home services:  () Home health  /  () Rehab  /  () IV Abx  /  () Education  /  () None            Counseled on/Discussed cessation of:  () Risky behaviors  /  (X) Smoking cessation  /  (X) Diet  /  () Exercise  /  () Other    (X) Discussion with patient and/or family regarding goals of care  (X) Discussed Case and Plan with Medical Attending, Name: Dr. Bryson ILEANA MCNEIL  /   47y  /  Female  /  MRN#: [id number]    CODE STATUS:   CONDITION:     -----------------------------------------------------------------------------------------------------  SUBJECTIVE  Patient is a 47y old Female who presents with a chief complaint of worsening laryngeal candidiasis (04 Jun 2018 20:08)  Currently admitted to medicine with the primary diagnosis of Candida esophagitis    HOSPITAL DAY: 20d   OVERNIGHT EVENTS: Cough    TODAY: Patient was seen this morning at bedside. Currently, the patient reports ill and nonproductive cough. She is also complaining of some achy pain in lower back.    Review of systems is otherwise negative.    -----------------------------------------------------------------------------------------------------  OBJECTIVE  PHYSICAL EXAM:    VITAL SIGNS: Last 24 Hours  T(C): 36.1 (24 Jun 2018 05:38), Max: 37.1 (23 Jun 2018 20:32)  T(F): 97 (24 Jun 2018 05:38), Max: 98.8 (23 Jun 2018 20:32)  HR: 85 (24 Jun 2018 05:38) (79 - 99)  BP: 109/59 (24 Jun 2018 05:38) (109/59 - 152/74)  BP(mean): --  RR: 20 (24 Jun 2018 05:38) (20 - 20)  SpO2: 93% (23 Jun 2018 09:24) (93% - 93%)    GENERAL: Awake, alert, oriented x3;  HEAD: NC/AT.  ENT: EOMI; Conjunctivae pink, Sclera clear; Oral mucosa moist with no edema, erythema, exudates.  NECK: Supple  CARDIO: S1 & S2; RRR; No M/R/G appreciated.  RESP: Equal expansion; Course sounds diffusely with mild wheezes BL  GI: Soft; NT/ND; bowel sounds positive x4 quadrants.  : Deferred.  SKIN: erythema and edema in UE BL mildly improved.    LABS:                        12.5   25.66 )-----------( 315      ( 23 Jun 2018 07:22 )             38.2     06-23    135  |  95<L>  |  23<H>  ----------------------------<  128<H>  4.0   |  25  |  1.0    Ca    9.3      23 Jun 2018 07:22                Culture - Blood (collected 21 Jun 2018 23:48)  Source: .Blood None  Preliminary Report (23 Jun 2018 06:01):    No growth to date.          RADIOLOGY:    ALLERGIES:  Avelox (Other)  Plaquenil (Other)  Vantin (Other (Mild))    MEDICATIONS:  ALBUTerol/ipratropium for Nebulization 3 milliLiter(s) Nebulizer every 6 hours  ALPRAZolam 2 milliGRAM(s) Oral three times a day  bismuth subsalicylate Liquid 30 milliLiter(s) Oral daily  docusate sodium 100 milliGRAM(s) Oral three times a day  fluticasone furoate/vilanterol 100-25 MICROgram(s) Inhaler 1 Puff(s) Inhalation daily  folic acid 1 milliGRAM(s) Oral daily  gabapentin 200 milliGRAM(s) Oral two times a day  hydrALAZINE 50 milliGRAM(s) Oral every 12 hours  hydrOXYzine hydrochloride 25 milliGRAM(s) Oral two times a day  lactobacillus acidophilus 1 Tablet(s) Oral daily  metoprolol succinate ER 25 milliGRAM(s) Oral daily  micafungin IVPB 100 milliGRAM(s) IV Intermittent every 24 hours  morphine ER Tablet 200 milliGRAM(s) Oral every 8 hours  NIFEdipine XL 30 milliGRAM(s) Oral <User Schedule>  nystatin    Suspension 319506 Unit(s) Oral four times a day  pantoprazole    Tablet 40 milliGRAM(s) Oral before breakfast  piperacillin/tazobactam IVPB. 3.375 Gram(s) IV Intermittent every 8 hours  predniSONE   Tablet 40 milliGRAM(s) Oral daily  rivaroxaban 20 milliGRAM(s) Oral every 24 hours  senna 2 Tablet(s) Oral at bedtime  traZODone 100 milliGRAM(s) Oral at bedtime    PRN MEDICATIONS  acetaminophen   Tablet 650 milliGRAM(s) Oral every 4 hours PRN  guaiFENesin    Syrup 200 milliGRAM(s) Oral every 6 hours PRN    -----------------------------------------------------------------------------------------------------  ASSESSMENT  48 y/o F with significant PMH of Lupus, CHF s/p AICD+PPM, DVT on xarelto, COPD, RA, laryngeal cancer s/p excision in november, s/p  2 week history of esophageal thrush 4 weeks ago status here with laryngeal candidiasis which failed outpatient PO antifungal treatment. Course complicated by acute renal failure, which has since improved. Later had laryngeal edema identified treated empirically with IV steroid which improved. Course later complicated by acute exacerbation of underlying COPD which is now improving. Now course complicated by sepsis secondary to LUE cellulitis and adjacent superficial thrombophlebitis as well as Klebsiella R middle lobe pneumonia.    1. COPD exacerbation, actively wheezing  ·	c/w duonebs q4+prn,   ·	c/w PO steroid  ·	check peak flow  ·	c/w home breo  ·	ADVISED ON SMOKING CESSATION.    2. Sepsis 2/2 Klebsiella right middle lobar pneumonia (gram negative sepsis)  ·	discussed with patient and ID  ·	patient with qtc prolongation secondary to moxifloxacin previously  ·	patient with anaphylaxis to vantin previously but has tolerated penicillins well in the past  ·	doubting thrombophlebitis is an actual nidus of cellulitis as site had improved markedly with elevation and warm compress, stopping linezolid  ·	Started augmentin, trend for fever curve and WBC  ·	Midline is a possibility as access has been difficult    3. N/V and abdominal pain- not active at this point  ·	discontinued antiemetics as potential interaction with zyvox    4. TIAN on CKD likely 2/2 to prerenal as per renal recs- improved now  ·	amphotericin stopped  ·	c/w to monitor BMP for Cr,   ·	urine studies--> FeNa 1.3 (intrinsic renal injury)    5. Laryngeal Candidiasis, s/p EGD 5/15/2018  ·	c/w IV micafungin while inpatient, Itraconazole 200mg PO BID x 7D outpatient as per ID rec  ·	will clarify with ID prior to D/C for definite course of antifungal therapy upon discharge  ·	HIV negative (5/10)  ·	f/u CXR --> no ACS   ·	 f/u blood cultures - negative    6. Throat Cancer s/p excision in Nov 2017; as per pt she was put on CellCept for her LUPUS 2 years ago by her Rheumatologist  and stopped taking it in Feb2018. She said she is not on any other chemotherapy.   ·	Heme/onc consult ordered --> f/u with her doctor outpatient; pt was seen by Heme/Onc () on last admission.    7. Chronic diastolic HF s/p AICD+PPM:   ·	Echo done 6/7 showed LVEF 65%     8. Acute DVT  ·	On Xarelto.    9. HTN - stable  ·	c/w toprol,   ·	d/c hydralazine    10. RA - controlled  ·	c/w outpt benlysta + methotrexate shots  ·	c/w folic acid supplementation    11. Chronic Pain 2/2 Multiple Back Sx  ·	c/w MS contin   ·	c/w dilaudid PRN  ·	c/w gabapentin    12. Anxiety  ·	c/w xanax PRN per home dosing  ·	c/w trazodone    13. hyperkalemia - resolved    GI PPX: Protonix  DVT PPX: Back on home NOAC  DIET:  () Regular  /  () Cardiac  / () Renal  /  () Diabetic  /  () Fluid restriction  /   () NPO  ACTIVITY:  (X) Ad Talita  /  () Advance as Tolerated  /  () Bed Rest  /  () Fall Precaution  /  () Seizure precaution    -----------------------------------------------------------------------------------------------------  Present today:  () Congestive Heart Failure               If yes, ()Acute / ()Acute on Chronic / ()Chronic  :  ()Systolic / ()Diastolic               Plan:    (X) Complicated Pneumonia                 If yes, ()Parapneumonic effusion / ()Abscess / () Multilobar / ()Other               Plan: Resolve with appropriate Abx regimen.    () Obesity / () Underweight,  BMI:               Plan:    () Functional Quadriplegia               Plan:    () Encephalopathy              Plan:    -----------------------------------------------------------------------------------------------------  Disposition:  Patient to be discharged when condition(s) optimized.            Discharge to:  (X) Home  /  () SNF  /  () Hospice  /  () Other            Home services:  () Home health  /  () Rehab  /  () IV Abx  /  () Education  /  () None            Counseled on/Discussed cessation of:  () Risky behaviors  /  (X) Smoking cessation  /  (X) Diet  /  () Exercise  /  () Other    (X) Discussion with patient and/or family regarding goals of care  (X) Discussed Case and Plan with Medical Attending, Name: Dr. Bryson ILEANA MCNEIL  /   47y  /  Female  /  MRN#: [id number]    CODE STATUS:   CONDITION:     -----------------------------------------------------------------------------------------------------  SUBJECTIVE  Patient is a 47y old Female who presents with a chief complaint of worsening laryngeal candidiasis (04 Jun 2018 20:08)  Currently admitted to medicine with the primary diagnosis of Candida esophagitis    HOSPITAL DAY: 20d   OVERNIGHT EVENTS: Cough    TODAY: Patient was seen this morning at bedside. Currently, the patient reports ill and nonproductive cough. She is also complaining of some achy pain in lower back.    Review of systems is otherwise negative.    -----------------------------------------------------------------------------------------------------  OBJECTIVE  PHYSICAL EXAM:    VITAL SIGNS: Last 24 Hours  T(C): 36.1 (24 Jun 2018 05:38), Max: 37.1 (23 Jun 2018 20:32)  T(F): 97 (24 Jun 2018 05:38), Max: 98.8 (23 Jun 2018 20:32)  HR: 85 (24 Jun 2018 05:38) (79 - 99)  BP: 109/59 (24 Jun 2018 05:38) (109/59 - 152/74)  BP(mean): --  RR: 20 (24 Jun 2018 05:38) (20 - 20)  SpO2: 93% (23 Jun 2018 09:24) (93% - 93%)    GENERAL: Awake, alert, oriented x3;  HEAD: NC/AT.  ENT: EOMI; Conjunctivae pink, Sclera clear; Oral mucosa moist with no edema, erythema, exudates.  NECK: Supple  CARDIO: S1 & S2; RRR; No M/R/G appreciated.  RESP: Equal expansion; Course sounds diffusely with mild wheezes BL  GI: Soft; NT/ND; bowel sounds positive x4 quadrants.  : Deferred.  SKIN: erythema and edema in UE BL mildly improved.    LABS:                        12.5   25.66 )-----------( 315      ( 23 Jun 2018 07:22 )             38.2     06-23    135  |  95<L>  |  23<H>  ----------------------------<  128<H>  4.0   |  25  |  1.0    Ca    9.3      23 Jun 2018 07:22                Culture - Blood (collected 21 Jun 2018 23:48)  Source: .Blood None  Preliminary Report (23 Jun 2018 06:01):    No growth to date.          RADIOLOGY:    ALLERGIES:  Avelox (Other)  Plaquenil (Other)  Vantin (Other (Mild))    MEDICATIONS:  ALBUTerol/ipratropium for Nebulization 3 milliLiter(s) Nebulizer every 6 hours  ALPRAZolam 2 milliGRAM(s) Oral three times a day  bismuth subsalicylate Liquid 30 milliLiter(s) Oral daily  docusate sodium 100 milliGRAM(s) Oral three times a day  fluticasone furoate/vilanterol 100-25 MICROgram(s) Inhaler 1 Puff(s) Inhalation daily  folic acid 1 milliGRAM(s) Oral daily  gabapentin 200 milliGRAM(s) Oral two times a day  hydrALAZINE 50 milliGRAM(s) Oral every 12 hours  hydrOXYzine hydrochloride 25 milliGRAM(s) Oral two times a day  lactobacillus acidophilus 1 Tablet(s) Oral daily  metoprolol succinate ER 25 milliGRAM(s) Oral daily  micafungin IVPB 100 milliGRAM(s) IV Intermittent every 24 hours  morphine ER Tablet 200 milliGRAM(s) Oral every 8 hours  NIFEdipine XL 30 milliGRAM(s) Oral <User Schedule>  nystatin    Suspension [id number] Unit(s) Oral four times a day  pantoprazole    Tablet 40 milliGRAM(s) Oral before breakfast  piperacillin/tazobactam IVPB. 3.375 Gram(s) IV Intermittent every 8 hours  predniSONE   Tablet 40 milliGRAM(s) Oral daily  rivaroxaban 20 milliGRAM(s) Oral every 24 hours  senna 2 Tablet(s) Oral at bedtime  traZODone 100 milliGRAM(s) Oral at bedtime    PRN MEDICATIONS  acetaminophen   Tablet 650 milliGRAM(s) Oral every 4 hours PRN  guaiFENesin    Syrup 200 milliGRAM(s) Oral every 6 hours PRN    -----------------------------------------------------------------------------------------------------  ASSESSMENT  46 y/o F with significant PMH of Lupus, CHF s/p AICD+PPM, DVT on xarelto, COPD, RA, laryngeal cancer s/p excision in november, s/p  2 week history of esophageal thrush 4 weeks ago status here with laryngeal candidiasis which failed outpatient PO antifungal treatment. Course complicated by acute renal failure, which has since improved. Later had laryngeal edema identified treated empirically with IV steroid which improved. Course later complicated by acute exacerbation of underlying COPD which is now improving. Now course complicated by sepsis secondary to LUE cellulitis and adjacent superficial thrombophlebitis as well as Klebsiella R middle lobe pneumonia.    1. COPD exacerbation, actively wheezing  ·	c/w duonebs q4+prn,   ·	c/w PO steroid  ·	check peak flow  ·	c/w home breo  ·	ADVISED ON SMOKING CESSATION.    2. Sepsis 2/2 Klebsiella right middle lobar pneumonia (gram negative sepsis)  ·	discussed with patient and ID  ·	patient with qtc prolongation secondary to moxifloxacin previously  ·	patient with anaphylaxis to vantin previously but has tolerated penicillins well in the past  ·	doubting thrombophlebitis is an actual nidus of cellulitis as site had improved markedly with elevation and warm compress, stopping linezolid  ·	Started augmentin, trend for fever curve and WBC  ·	Midline is a possibility as access has been difficult    3. N/V and abdominal pain- not active at this point    4. TIAN on CKD likely 2/2 to prerenal as per renal recs- improved now  ·	amphotericin stopped  ·	c/w to monitor BMP for Cr,   ·	urine studies--> FeNa 1.3 (intrinsic renal injury)    5. Laryngeal Candidiasis, s/p EGD 5/15/2018  ·	Itraconazole 200mg PO BID x 7D outpatient as per ID rec  ·	Receiving oral antifungals  ·	HIV negative (5/10)  ·	f/u CXR --> no ACS   ·	 f/u blood cultures - negative    6. Throat Cancer s/p excision in Nov 2017; as per pt she was put on CellCept for her LUPUS 2 years ago by her Rheumatologist  and stopped taking it in Feb2018. She said she is not on any other chemotherapy.   ·	Heme/onc consult ordered --> f/u with her doctor outpatient; pt was seen by Heme/Onc () on last admission.    7. Chronic diastolic HF s/p AICD+PPM:   ·	Echo done 6/7 showed LVEF 65%     8. Acute DVT  ·	On Xarelto.    9. HTN - stable  ·	c/w toprol,   ·	d/c hydralazine    10. RA - controlled  ·	c/w outpt benlysta + methotrexate shots  ·	c/w folic acid supplementation    11. Chronic Pain 2/2 Multiple Back Sx  ·	c/w MS contin   ·	c/w dilaudid PRN  ·	c/w gabapentin    12. Anxiety  ·	c/w xanax PRN per home dosing  ·	c/w trazodone    13. hyperkalemia - resolved    GI PPX: Protonix  DVT PPX: Back on home NOAC  DIET:  () Regular  /  () Cardiac  / () Renal  /  () Diabetic  /  () Fluid restriction  /   () NPO  ACTIVITY:  (X) Ad Talita  /  () Advance as Tolerated  /  () Bed Rest  /  () Fall Precaution  /  () Seizure precaution    -----------------------------------------------------------------------------------------------------  Present today:  () Congestive Heart Failure               If yes, ()Acute / ()Acute on Chronic / ()Chronic  :  ()Systolic / ()Diastolic               Plan:    (X) Complicated Pneumonia                 If yes, ()Parapneumonic effusion / ()Abscess / () Multilobar / ()Other               Plan: Resolve with appropriate Abx regimen.    () Obesity / () Underweight,  BMI:               Plan:    () Functional Quadriplegia               Plan:    () Encephalopathy              Plan:    -----------------------------------------------------------------------------------------------------  Disposition:  Patient to be discharged when condition(s) optimized.            Discharge to:  (X) Home  /  () SNF  /  () Hospice  /  () Other            Home services:  () Home health  /  () Rehab  /  () IV Abx  /  () Education  /  () None            Counseled on/Discussed cessation of:  () Risky behaviors  /  (X) Smoking cessation  /  (X) Diet  /  () Exercise  /  () Other    (X) Discussion with patient and/or family regarding goals of care  (X) Discussed Case and Plan with Medical Attending, Name: Dr. Bryson

## 2018-06-24 NOTE — PROGRESS NOTE ADULT - ASSESSMENT
46 y/o F with significant PMH of Lupus, CHF s/p AICD+PPM, DVT on xarelto, COPD, RA, laryngeal cancer s/p excision in november, s/p  2 week history of esophageal thrush 4 weeks ago status here with laryngeal candidiasis which failed outpatient PO antifungal treatment. Course complicated by acute renal failure, which has since improved. Later had laryngeal edema identified treated empirically with IV steroid which improved. Course later complicated by acute exacerbation of underlying COPD which is now improving. Now course complicated by sepsis secondary to LUE cellulitis and adjacent superficial thrombophlebitis    # COPD exacerbation , actively wheezing  - c/w duonebs q4+prn,   c/w PO steroid will probably titrate to 20mg tomorrow if wheezing improving  check peak flow  - c/w home breo  - consistently reinforcing smoking cessation, patient adamantly denies smoking    #Sepsis 2/2 Klebsiella right middle lobar pneumonia (gram negative sepsis)  discussed with patient and ID  patient with qtc prolongation secondary to moxifloxacin previously  patient with anaphylaxis to vantin previously but has tolerated penicillins well in the past  patient's IV sites are not lasting and she has not recieved her IV zosyn, will cover with augmentin for now and requested she is outfitted with midline if poor clinical response observed  doubting thrombophlebitis is an actual nidus of cellulitis as site had improved markedly with elevation and warm compress, stopping linezolid  trend for fever curve and WBC    # TIAN on CKD likely 2/2 to prerenal as per renal recs- improved now  - amphotericin stopped  - c/w to monitor BMP for Cr,   - urine studies--> FeNa 1.3 (intrinsic renal injury)    # Laryngeal Candidiasis, s/p EGD 5/15/2018  -secondary to IV issues she has not received IV micafungin for the past 3 days  -discussed with ENT team about another visualization as bronchospasm improves  -will d/w ID and pharmacy tomorrow whether PO antifungal warranted  - HIV negative (5/10)  - f/u CXR --> no ACS   - f/u blood cultures - negative    # Throat Cancer s/p excision in Nov 2017; as per pt she was put on CellCept for her LUPUS 2 years ago by her Rheumatologist  and stopped taking it in Feb2018. She said she is not on any other chemotherapy.   - Heme/onc consult ordered --> f/u with her doctor outpatient; pt was seen by Heme/Onc () on last admission.      # chronic diastolic HF s/p AICD+PPM:   - echo done 6/7 showed LVEF 65%     # Acute DVT  - On Xarelto.    # HTN - stable  - c/w toprol,   - d/c hydralazine    # RA - controlled  - c/w outpt benlysta + methotrexate shots  - c/w folic acid supplementation    # Chronic Pain 2/2 Multiple Back Sx  - c/w MS contin   - c/w dilaudid PRN  - c/w gabapentin    # Anxiety  - c/w xanax PRN per home dosing  - c/w trazodone    # hyperkalemia - resolved      DVT PPX: back on home NOAC  GI PPx: protonix  Dispo: Home after medically stable

## 2018-06-25 LAB
ANION GAP SERPL CALC-SCNC: 13 MMOL/L — SIGNIFICANT CHANGE UP (ref 7–14)
APPEARANCE UR: CLEAR — SIGNIFICANT CHANGE UP
BILIRUB UR-MCNC: NEGATIVE — SIGNIFICANT CHANGE UP
BUN SERPL-MCNC: 22 MG/DL — HIGH (ref 10–20)
CALCIUM SERPL-MCNC: 9.1 MG/DL — SIGNIFICANT CHANGE UP (ref 8.5–10.1)
CHLORIDE SERPL-SCNC: 102 MMOL/L — SIGNIFICANT CHANGE UP (ref 98–110)
CO2 SERPL-SCNC: 25 MMOL/L — SIGNIFICANT CHANGE UP (ref 17–32)
COLOR SPEC: YELLOW — SIGNIFICANT CHANGE UP
CREAT SERPL-MCNC: 0.9 MG/DL — SIGNIFICANT CHANGE UP (ref 0.7–1.5)
DIFF PNL FLD: NEGATIVE — SIGNIFICANT CHANGE UP
GLUCOSE SERPL-MCNC: 151 MG/DL — HIGH (ref 70–99)
GLUCOSE UR QL: NEGATIVE MG/DL — SIGNIFICANT CHANGE UP
HCT VFR BLD CALC: 32.7 % — LOW (ref 37–47)
HGB BLD-MCNC: 10.5 G/DL — LOW (ref 12–16)
KETONES UR-MCNC: NEGATIVE — SIGNIFICANT CHANGE UP
LEUKOCYTE ESTERASE UR-ACNC: NEGATIVE — SIGNIFICANT CHANGE UP
MAGNESIUM SERPL-MCNC: 1.8 MG/DL — SIGNIFICANT CHANGE UP (ref 1.8–2.4)
MCHC RBC-ENTMCNC: 27.6 PG — SIGNIFICANT CHANGE UP (ref 27–31)
MCHC RBC-ENTMCNC: 32.1 G/DL — SIGNIFICANT CHANGE UP (ref 32–37)
MCV RBC AUTO: 86.1 FL — SIGNIFICANT CHANGE UP (ref 81–99)
NITRITE UR-MCNC: NEGATIVE — SIGNIFICANT CHANGE UP
NRBC # BLD: 0 /100 WBCS — SIGNIFICANT CHANGE UP (ref 0–0)
PH UR: 6 — SIGNIFICANT CHANGE UP (ref 5–8)
PLATELET # BLD AUTO: 251 K/UL — SIGNIFICANT CHANGE UP (ref 130–400)
POTASSIUM SERPL-MCNC: 4.5 MMOL/L — SIGNIFICANT CHANGE UP (ref 3.5–5)
POTASSIUM SERPL-SCNC: 4.5 MMOL/L — SIGNIFICANT CHANGE UP (ref 3.5–5)
PROT UR-MCNC: NEGATIVE MG/DL — SIGNIFICANT CHANGE UP
RBC # BLD: 3.8 M/UL — LOW (ref 4.2–5.4)
RBC # FLD: 13.4 % — SIGNIFICANT CHANGE UP (ref 11.5–14.5)
SODIUM SERPL-SCNC: 140 MMOL/L — SIGNIFICANT CHANGE UP (ref 135–146)
SP GR SPEC: 1.01 — SIGNIFICANT CHANGE UP (ref 1.01–1.03)
UROBILINOGEN FLD QL: 0.2 MG/DL — SIGNIFICANT CHANGE UP (ref 0.2–0.2)
WBC # BLD: 17.02 K/UL — HIGH (ref 4.8–10.8)
WBC # FLD AUTO: 17.02 K/UL — HIGH (ref 4.8–10.8)

## 2018-06-25 RX ORDER — FLUCONAZOLE 150 MG/1
200 TABLET ORAL DAILY
Qty: 0 | Refills: 0 | Status: DISCONTINUED | OUTPATIENT
Start: 2018-06-25 | End: 2018-06-26

## 2018-06-25 RX ADMIN — Medication 2 MILLIGRAM(S): at 15:38

## 2018-06-25 RX ADMIN — MORPHINE SULFATE 200 MILLIGRAM(S): 50 CAPSULE, EXTENDED RELEASE ORAL at 21:53

## 2018-06-25 RX ADMIN — Medication 20 MILLIGRAM(S): at 06:32

## 2018-06-25 RX ADMIN — Medication 1 TABLET(S): at 12:35

## 2018-06-25 RX ADMIN — Medication 1 TABLET(S): at 06:32

## 2018-06-25 RX ADMIN — RIVAROXABAN 20 MILLIGRAM(S): KIT at 17:10

## 2018-06-25 RX ADMIN — Medication 2 MILLIGRAM(S): at 21:53

## 2018-06-25 RX ADMIN — FLUCONAZOLE 200 MILLIGRAM(S): 150 TABLET ORAL at 12:35

## 2018-06-25 RX ADMIN — Medication 500000 UNIT(S): at 12:36

## 2018-06-25 RX ADMIN — Medication 25 MILLIGRAM(S): at 06:32

## 2018-06-25 RX ADMIN — Medication 3 MILLILITER(S): at 14:09

## 2018-06-25 RX ADMIN — Medication 2 MILLIGRAM(S): at 06:31

## 2018-06-25 RX ADMIN — GABAPENTIN 200 MILLIGRAM(S): 400 CAPSULE ORAL at 06:32

## 2018-06-25 RX ADMIN — Medication 500000 UNIT(S): at 17:10

## 2018-06-25 RX ADMIN — PANTOPRAZOLE SODIUM 40 MILLIGRAM(S): 20 TABLET, DELAYED RELEASE ORAL at 06:32

## 2018-06-25 RX ADMIN — Medication 50 MILLIGRAM(S): at 17:10

## 2018-06-25 RX ADMIN — GABAPENTIN 200 MILLIGRAM(S): 400 CAPSULE ORAL at 17:10

## 2018-06-25 RX ADMIN — FLUTICASONE FUROATE AND VILANTEROL TRIFENATATE 1 PUFF(S): 100; 25 POWDER RESPIRATORY (INHALATION) at 18:18

## 2018-06-25 RX ADMIN — Medication 1 MILLIGRAM(S): at 12:38

## 2018-06-25 RX ADMIN — Medication 50 MILLIGRAM(S): at 06:32

## 2018-06-25 RX ADMIN — Medication 500000 UNIT(S): at 06:33

## 2018-06-25 RX ADMIN — Medication 100 MILLIGRAM(S): at 21:52

## 2018-06-25 RX ADMIN — Medication 3 MILLILITER(S): at 07:23

## 2018-06-25 RX ADMIN — MORPHINE SULFATE 200 MILLIGRAM(S): 50 CAPSULE, EXTENDED RELEASE ORAL at 06:31

## 2018-06-25 RX ADMIN — Medication 30 MILLIGRAM(S): at 12:35

## 2018-06-25 RX ADMIN — Medication 1 TABLET(S): at 17:10

## 2018-06-25 RX ADMIN — Medication 500000 UNIT(S): at 23:12

## 2018-06-25 RX ADMIN — MORPHINE SULFATE 200 MILLIGRAM(S): 50 CAPSULE, EXTENDED RELEASE ORAL at 07:01

## 2018-06-25 RX ADMIN — Medication 30 MILLILITER(S): at 12:35

## 2018-06-25 RX ADMIN — MORPHINE SULFATE 200 MILLIGRAM(S): 50 CAPSULE, EXTENDED RELEASE ORAL at 15:38

## 2018-06-25 RX ADMIN — Medication 25 MILLIGRAM(S): at 17:10

## 2018-06-25 NOTE — PROGRESS NOTE ADULT - ASSESSMENT
Impression:    COPD exacerbation  Active smoker  LUE CELLULITIS better    Recommendations:    finish course of abx  f/up cbc  ABX PER ID  NEB AS NEEDED  PUL STANDPOINT OP F.UP

## 2018-06-25 NOTE — PROGRESS NOTE ADULT - SUBJECTIVE AND OBJECTIVE BOX
ILEANA MCNEIL  47y  Female      Patient is a 47y old  Female who presents with a chief complaint of worsening laryngeal candidiasis (04 Jun 2018 20:08)      INTERVAL HPI/OVERNIGHT EVENTS: feels weak. still having coughing      REVIEW OF SYSTEMS:  as above  All other review of systems negative    T(C): 36.7 (06-25-18 @ 12:58), Max: 36.7 (06-25-18 @ 12:58)  HR: 86 (06-25-18 @ 12:58) (77 - 86)  BP: 126/81 (06-25-18 @ 12:58) (124/58 - 139/70)  RR: 20 (06-25-18 @ 12:58) (16 - 20)  SpO2: 91% (06-24-18 @ 20:58) (91% - 91%)  Wt(kg): --Vital Signs Last 24 Hrs  T(C): 36.7 (25 Jun 2018 12:58), Max: 36.7 (25 Jun 2018 12:58)  T(F): 98.1 (25 Jun 2018 12:58), Max: 98.1 (25 Jun 2018 12:58)  HR: 86 (25 Jun 2018 12:58) (77 - 86)  BP: 126/81 (25 Jun 2018 12:58) (124/58 - 139/70)  BP(mean): --  RR: 20 (25 Jun 2018 12:58) (16 - 20)  SpO2: 91% (24 Jun 2018 20:58) (91% - 91%)        PHYSICAL EXAM:  GENERAL: NAD  PSYCH: no agitation, baseline mentation  HEENT: hoarse as prior  NERVOUS SYSTEM:  Alert & Oriented X3, no new focal deficits  PULMONARY: bilateral expiratory wheezing with marginally improved air entry bilaterally  CARDIOVASCULAR: Regular rate and rhythm; No murmurs, rubs, or gallops  GI: Soft, Nontender, Nondistended; Bowel sounds present  EXTREMITIES:  2+ Peripheral Pulses, No clubbing, cyanosis, or edema    Consultant(s) Notes Reviewed:  [x ] YES  [ ] NO    Discussed with Consultants/Other Providers [ x] YES     LABS                          10.5   17.02 )-----------( 251      ( 25 Jun 2018 05:36 )             32.7     06-25    140  |  102  |  22<H>  ----------------------------<  151<H>  4.5   |  25  |  0.9    Ca    9.1      25 Jun 2018 05:36  Mg     1.8     06-25            Lactate Trend        CAPILLARY BLOOD GLUCOSE            RADIOLOGY & ADDITIONAL TESTS:    Imaging Personally Reviewed:  [ ] YES  [ ] NO    HEALTH ISSUES - PROBLEM Dx:

## 2018-06-25 NOTE — PROGRESS NOTE ADULT - ASSESSMENT
48 y/o F with significant PMH of Lupus, CHF s/p AICD+PPM, DVT on xarelto, COPD, RA, laryngeal cancer s/p excision in november, s/p  2 week history of esophageal thrush 4 weeks ago status here with laryngeal candidiasis which failed outpatient PO antifungal treatment. Course complicated by acute renal failure, which has since improved. Later had laryngeal edema identified treated empirically with IV steroid which improved. Course later complicated by acute exacerbation of underlying COPD which is now improving. Now course complicated by sepsis secondary to LUE cellulitis and adjacent superficial thrombophlebitis    # COPD exacerbation improving  - c/w duonebs q4+prn,   slow titration of oral steroid  - c/w home breo  - consistently reinforcing smoking cessation, patient adamantly denies smoking    #Sepsis 2/2 Klebsiella right middle lobar pneumonia (gram negative sepsis), superficial thrombophlebitis of LUE with possible superimposed cellulitis improved  c/w augmentin    # TIAN on CKD likely 2/2 to prerenal as per renal recs- improved now  - amphotericin stopped  - c/w to monitor BMP for Cr,   - urine studies--> FeNa 1.3 (intrinsic renal injury)    # Laryngeal Candidiasis, s/p EGD 5/15/2018  -discussed with ENT team about another visualization as bronchospasm improves  -oral diflucan for now  - HIV negative (5/10)  - f/u CXR --> no ACS   - f/u blood cultures - negative    # Throat Cancer s/p excision in Nov 2017; as per pt she was put on CellCept for her LUPUS 2 years ago by her Rheumatologist  and stopped taking it in Feb2018. She said she is not on any other chemotherapy.   - Heme/onc consult ordered --> f/u with her doctor outpatient; pt was seen by Heme/Onc () on last admission.      # chronic diastolic HF s/p AICD+PPM:   - echo done 6/7 showed LVEF 65%     # Acute DVT  - On Xarelto.    # HTN - stable  - c/w toprol,   - d/c hydralazine    # RA - controlled  - c/w outpt benlysta + methotrexate shots  - c/w folic acid supplementation    # Chronic Pain 2/2 Multiple Back Sx  - c/w MS contin   - c/w dilaudid PRN  - c/w gabapentin    # Anxiety  - c/w xanax PRN per home dosing  - c/w trazodone    # hyperkalemia - resolved      DVT PPX: back on home NOAC  GI PPx: protonix  Dispo: Possible discharge tomorrow, will repeat wbc and reevaluate breathing in am

## 2018-06-25 NOTE — PROGRESS NOTE ADULT - SUBJECTIVE AND OBJECTIVE BOX
OVERNIGHT EVENTS: feels better, improved t    Vital Signs Last 24 Hrs  T(C): 36.7 (25 Jun 2018 12:58), Max: 36.7 (25 Jun 2018 12:58)  T(F): 98.1 (25 Jun 2018 12:58), Max: 98.1 (25 Jun 2018 12:58)  HR: 86 (25 Jun 2018 12:58) (77 - 86)  BP: 126/81 (25 Jun 2018 12:58) (124/58 - 139/70)  BP(mean): --  RR: 20 (25 Jun 2018 12:58) (16 - 20)  SpO2: 91% (24 Jun 2018 20:58) (91% - 91%)    PHYSICAL EXAMINATION:    GENERAL: The patient is awake and alert in no apparent distress.     HEENT: Head is normocephalic and atraumatic. Extraocular muscles are intact. Mucous membranes are moist.    NECK: Supple.    LUNGS: improved wheezing    HEART: Regular rate and rhythm without murmur.    ABDOMEN: Soft, nontender, and nondistended.      EXTREMITIES: Without any cyanosis, clubbing, rash, lesions or edema.    NEUROLOGIC: Grossly intact.    SKIN: No ulceration or induration present.      LABS:                        10.5   17.02 )-----------( 251      ( 25 Jun 2018 05:36 )             32.7     06-25    140  |  102  |  22<H>  ----------------------------<  151<H>  4.5   |  25  |  0.9    Ca    9.1      25 Jun 2018 05:36  Mg     1.8     06-25                              MICROBIOLOGY:  Culture Results:   No growth to date. (06-23 @ 07:22)  Culture Results:   No growth to date. (06-21 @ 23:48)      MEDICATIONS  (STANDING):  ALBUTerol/ipratropium for Nebulization 3 milliLiter(s) Nebulizer every 6 hours  ALPRAZolam 2 milliGRAM(s) Oral three times a day  amoxicillin  875 milliGRAM(s)/clavulanate 1 Tablet(s) Oral two times a day  bismuth subsalicylate Liquid 30 milliLiter(s) Oral daily  docusate sodium 100 milliGRAM(s) Oral three times a day  fluconAZOLE   Tablet 200 milliGRAM(s) Oral daily  fluticasone furoate/vilanterol 100-25 MICROgram(s) Inhaler 1 Puff(s) Inhalation daily  folic acid 1 milliGRAM(s) Oral daily  gabapentin 200 milliGRAM(s) Oral two times a day  hydrALAZINE 50 milliGRAM(s) Oral every 12 hours  hydrOXYzine hydrochloride 25 milliGRAM(s) Oral two times a day  lactobacillus acidophilus 1 Tablet(s) Oral daily  metoprolol succinate ER 25 milliGRAM(s) Oral daily  morphine ER Tablet 200 milliGRAM(s) Oral every 8 hours  NIFEdipine XL 30 milliGRAM(s) Oral <User Schedule>  nystatin    Suspension 436761 Unit(s) Oral four times a day  pantoprazole    Tablet 40 milliGRAM(s) Oral before breakfast  predniSONE   Tablet 20 milliGRAM(s) Oral daily  rivaroxaban 20 milliGRAM(s) Oral every 24 hours  senna 2 Tablet(s) Oral at bedtime  traZODone 100 milliGRAM(s) Oral at bedtime    MEDICATIONS  (PRN):  acetaminophen   Tablet 650 milliGRAM(s) Oral every 4 hours PRN For Temp greater than 38 C (100.4 F)  guaiFENesin    Syrup 200 milliGRAM(s) Oral every 6 hours PRN Cough      RADIOLOGY & ADDITIONAL STUDIES:

## 2018-06-25 NOTE — PROGRESS NOTE ADULT - SUBJECTIVE AND OBJECTIVE BOX
ILEANA MCNEIL  47y, Female      OVERNIGHT EVENTS:    no fevers.    VITALS:  T(F): 96.7, Max: 98.5 (06-24-18 @ 13:21)  HR: 77  BP: 124/58  RR: 16Vital Signs Last 24 Hrs  T(C): 35.9 (25 Jun 2018 05:00), Max: 36.9 (24 Jun 2018 13:21)  T(F): 96.7 (25 Jun 2018 05:00), Max: 98.5 (24 Jun 2018 13:21)  HR: 77 (25 Jun 2018 05:00) (77 - 86)  BP: 124/58 (25 Jun 2018 05:00) (124/58 - 139/70)  BP(mean): --  RR: 16 (25 Jun 2018 05:00) (16 - 20)  SpO2: 91% (24 Jun 2018 20:58) (91% - 91%)    TESTS & MEASUREMENTS:                        10.5   17.02 )-----------( 251      ( 25 Jun 2018 05:36 )             32.7     06-25    140  |  102  |  22<H>  ----------------------------<  151<H>  4.5   |  25  |  0.9    Ca    9.1      25 Jun 2018 05:36  Mg     1.8     06-25          Culture - Blood (collected 06-23-18 @ 07:22)  Source: .Blood None  Preliminary Report (06-24-18 @ 15:01):    No growth to date.    Culture - Blood (collected 06-21-18 @ 23:48)  Source: .Blood None  Preliminary Report (06-23-18 @ 06:01):    No growth to date.    Culture - Sputum (collected 06-19-18 @ 23:00)  Source: .Sputum Sputum  Gram Stain (06-20-18 @ 14:50):    Moderate polymorphonuclear leukocytes seen per low power field    Few Squamous epithelial cells seen per low power field    Numerous Gram Positive Cocci seen per oil power field    Moderate Gram Negative Rods seen per oil power field  Final Report (06-22-18 @ 10:57):    Numerous Klebsiella pneumoniae    Normal Respiratory Dariana absent  Organism: Klebsiella pneumoniae (06-22-18 @ 10:57)  Organism: Klebsiella pneumoniae (06-22-18 @ 10:57)      -  Amikacin: S <=8      -  Amoxicillin/Clavulanic Acid: S <=8/4      -  Ampicillin: R >16      -  Ampicillin/Sulbactam: S 8/4      -  Aztreonam: S <=4      -  Cefazolin: S <=2      -  Cefepime: S <=2      -  Cefoxitin: S <=4      -  Ceftriaxone: S <=1      -  Ciprofloxacin: S <=0.5      -  Ertapenem: S <=0.5      -  Gentamicin: S <=1      -  Imipenem: S <=1      -  Levofloxacin: S <=1      -  Meropenem: S <=1      -  Piperacillin/Tazobactam: S <=8      -  Tobramycin: S <=2      -  Trimethoprim/Sulfamethoxazole: S <=0.5/9.5      Method Type: SASKIA            RADIOLOGY & ADDITIONAL TESTS:    ANTIBIOTICS:  amoxicillin  875 milliGRAM(s)/clavulanate 1 Tablet(s) Oral two times a day  fluconAZOLE   Tablet 200 milliGRAM(s) Oral daily  nystatin    Suspension 921614 Unit(s) Oral four times a day

## 2018-06-25 NOTE — PROGRESS NOTE ADULT - NSHPATTENDINGPLANDISCUSS_GEN_ALL_CORE
above
the patient at bedside.
Patient, Covering Nurse, Medical Resident
Patient
TEAM
above
team
team
the patient at bedside.
TEAM
above
team
above
team

## 2018-06-25 NOTE — PROGRESS NOTE ADULT - ASSESSMENT
IMPRESSION:  Non suppurative superficial cephalic vein thrombophlebitis forearms bilaterally which has resolved.    RECOMMENDATIONS:  PO Augmentin 875 mg q12h for 6 more days.    Recall prn please.

## 2018-06-25 NOTE — PROGRESS NOTE ADULT - ATTENDING COMMENTS
Patient seen and examined independently of resident. Case discussed with housestaff, nursing and patient and 
Patient seen and examined independently of resident. Case discussed with housestaff, nursing and patient
improving thrush. laryngopharynx looks good on endoscopy. Improved voice. swelling ok.     We then performed an endoscopic evaluation of swallowing by having patient drink under endocopy, no aspiration was seen.     ok for discharge from an ENT standpoint. F/u as outpatient.
Patient seen and examined independently of resident. Case discussed with housestaff, nursing and patient

## 2018-06-25 NOTE — PROGRESS NOTE ADULT - PROVIDER SPECIALTY LIST ADULT
Cardiology
ENT
ENT
Hospitalist
Infectious Disease
Internal Medicine
Nephrology
Pulmonology
Internal Medicine
Hospitalist
Hospitalist
Internal Medicine
Hospitalist

## 2018-06-26 ENCOUNTER — TRANSCRIPTION ENCOUNTER (OUTPATIENT)
Age: 48
End: 2018-06-26

## 2018-06-26 VITALS — DIASTOLIC BLOOD PRESSURE: 88 MMHG | SYSTOLIC BLOOD PRESSURE: 153 MMHG | HEART RATE: 94 BPM

## 2018-06-26 LAB
ANION GAP SERPL CALC-SCNC: 16 MMOL/L — HIGH (ref 7–14)
BUN SERPL-MCNC: 22 MG/DL — HIGH (ref 10–20)
CALCIUM SERPL-MCNC: 9.6 MG/DL — SIGNIFICANT CHANGE UP (ref 8.5–10.1)
CHLORIDE SERPL-SCNC: 101 MMOL/L — SIGNIFICANT CHANGE UP (ref 98–110)
CO2 SERPL-SCNC: 25 MMOL/L — SIGNIFICANT CHANGE UP (ref 17–32)
CREAT SERPL-MCNC: 0.9 MG/DL — SIGNIFICANT CHANGE UP (ref 0.7–1.5)
GLUCOSE SERPL-MCNC: 96 MG/DL — SIGNIFICANT CHANGE UP (ref 70–99)
HCT VFR BLD CALC: 37.5 % — SIGNIFICANT CHANGE UP (ref 37–47)
HGB BLD-MCNC: 12.1 G/DL — SIGNIFICANT CHANGE UP (ref 12–16)
MCHC RBC-ENTMCNC: 27.6 PG — SIGNIFICANT CHANGE UP (ref 27–31)
MCHC RBC-ENTMCNC: 32.3 G/DL — SIGNIFICANT CHANGE UP (ref 32–37)
MCV RBC AUTO: 85.6 FL — SIGNIFICANT CHANGE UP (ref 81–99)
NRBC # BLD: 0 /100 WBCS — SIGNIFICANT CHANGE UP (ref 0–0)
PLATELET # BLD AUTO: 326 K/UL — SIGNIFICANT CHANGE UP (ref 130–400)
POTASSIUM SERPL-MCNC: 4.1 MMOL/L — SIGNIFICANT CHANGE UP (ref 3.5–5)
POTASSIUM SERPL-SCNC: 4.1 MMOL/L — SIGNIFICANT CHANGE UP (ref 3.5–5)
RBC # BLD: 4.38 M/UL — SIGNIFICANT CHANGE UP (ref 4.2–5.4)
RBC # FLD: 13.5 % — SIGNIFICANT CHANGE UP (ref 11.5–14.5)
SODIUM SERPL-SCNC: 142 MMOL/L — SIGNIFICANT CHANGE UP (ref 135–146)
WBC # BLD: 18.61 K/UL — HIGH (ref 4.8–10.8)
WBC # FLD AUTO: 18.61 K/UL — HIGH (ref 4.8–10.8)

## 2018-06-26 RX ORDER — HYDROXYZINE HCL 10 MG
0 TABLET ORAL
Qty: 0 | Refills: 0 | COMMUNITY

## 2018-06-26 RX ORDER — NIFEDIPINE 30 MG
1 TABLET, EXTENDED RELEASE 24 HR ORAL
Qty: 30 | Refills: 0 | OUTPATIENT
Start: 2018-06-26 | End: 2018-07-25

## 2018-06-26 RX ORDER — FLUCONAZOLE 150 MG/1
1 TABLET ORAL
Qty: 0 | Refills: 0 | COMMUNITY
Start: 2018-06-26

## 2018-06-26 RX ORDER — METOPROLOL TARTRATE 50 MG
1 TABLET ORAL
Qty: 1 | Refills: 0
Start: 2018-06-26 | End: 2018-07-25

## 2018-06-26 RX ORDER — ALPRAZOLAM 0.25 MG
0 TABLET ORAL
Qty: 0 | Refills: 0 | COMMUNITY

## 2018-06-26 RX ORDER — METOPROLOL TARTRATE 50 MG
1 TABLET ORAL
Qty: 1 | Refills: 0 | OUTPATIENT
Start: 2018-06-26 | End: 2018-07-25

## 2018-06-26 RX ORDER — IPRATROPIUM/ALBUTEROL SULFATE 18-103MCG
3 AEROSOL WITH ADAPTER (GRAM) INHALATION
Qty: 1 | Refills: 0
Start: 2018-06-26 | End: 2018-07-25

## 2018-06-26 RX ORDER — NIFEDIPINE 30 MG
1 TABLET, EXTENDED RELEASE 24 HR ORAL
Qty: 0 | Refills: 0 | COMMUNITY
Start: 2018-06-26

## 2018-06-26 RX ORDER — METOPROLOL TARTRATE 50 MG
1 TABLET ORAL
Qty: 0 | Refills: 0 | COMMUNITY

## 2018-06-26 RX ORDER — HYDRALAZINE HCL 50 MG
1 TABLET ORAL
Qty: 60 | Refills: 0
Start: 2018-06-26 | End: 2018-07-25

## 2018-06-26 RX ORDER — METHOTREXATE 2.5 MG/1
0 TABLET ORAL
Qty: 0 | Refills: 0 | COMMUNITY

## 2018-06-26 RX ORDER — HYDRALAZINE HCL 50 MG
0 TABLET ORAL
Qty: 0 | Refills: 0 | COMMUNITY

## 2018-06-26 RX ORDER — LACTOBACILLUS ACIDOPHILUS 100MM CELL
1 CAPSULE ORAL
Qty: 14 | Refills: 0 | OUTPATIENT
Start: 2018-06-26 | End: 2018-07-09

## 2018-06-26 RX ORDER — FLUCONAZOLE 150 MG/1
1 TABLET ORAL
Qty: 8 | Refills: 0 | OUTPATIENT
Start: 2018-06-26 | End: 2018-07-03

## 2018-06-26 RX ORDER — HYDRALAZINE HCL 50 MG
1 TABLET ORAL
Qty: 0 | Refills: 0 | DISCHARGE
Start: 2018-06-26 | End: 2018-07-25

## 2018-06-26 RX ADMIN — Medication 500000 UNIT(S): at 12:58

## 2018-06-26 RX ADMIN — MORPHINE SULFATE 200 MILLIGRAM(S): 50 CAPSULE, EXTENDED RELEASE ORAL at 14:21

## 2018-06-26 RX ADMIN — FLUCONAZOLE 200 MILLIGRAM(S): 150 TABLET ORAL at 12:58

## 2018-06-26 RX ADMIN — Medication 1 TABLET(S): at 06:07

## 2018-06-26 RX ADMIN — Medication 25 MILLIGRAM(S): at 06:06

## 2018-06-26 RX ADMIN — MORPHINE SULFATE 200 MILLIGRAM(S): 50 CAPSULE, EXTENDED RELEASE ORAL at 13:27

## 2018-06-26 RX ADMIN — RIVAROXABAN 20 MILLIGRAM(S): KIT at 17:20

## 2018-06-26 RX ADMIN — Medication 2 MILLIGRAM(S): at 13:27

## 2018-06-26 RX ADMIN — Medication 25 MILLIGRAM(S): at 17:20

## 2018-06-26 RX ADMIN — Medication 50 MILLIGRAM(S): at 17:20

## 2018-06-26 RX ADMIN — GABAPENTIN 200 MILLIGRAM(S): 400 CAPSULE ORAL at 17:20

## 2018-06-26 RX ADMIN — Medication 500000 UNIT(S): at 17:20

## 2018-06-26 RX ADMIN — Medication 1 TABLET(S): at 12:58

## 2018-06-26 RX ADMIN — Medication 3 MILLILITER(S): at 07:09

## 2018-06-26 RX ADMIN — Medication 1 TABLET(S): at 17:20

## 2018-06-26 RX ADMIN — GABAPENTIN 200 MILLIGRAM(S): 400 CAPSULE ORAL at 06:07

## 2018-06-26 RX ADMIN — Medication 3 MILLILITER(S): at 13:13

## 2018-06-26 RX ADMIN — PANTOPRAZOLE SODIUM 40 MILLIGRAM(S): 20 TABLET, DELAYED RELEASE ORAL at 06:10

## 2018-06-26 RX ADMIN — Medication 1 MILLIGRAM(S): at 12:59

## 2018-06-26 RX ADMIN — MORPHINE SULFATE 200 MILLIGRAM(S): 50 CAPSULE, EXTENDED RELEASE ORAL at 06:11

## 2018-06-26 RX ADMIN — Medication 2 MILLIGRAM(S): at 06:05

## 2018-06-26 RX ADMIN — Medication 30 MILLIGRAM(S): at 12:58

## 2018-06-26 RX ADMIN — FLUTICASONE FUROATE AND VILANTEROL TRIFENATATE 1 PUFF(S): 100; 25 POWDER RESPIRATORY (INHALATION) at 07:49

## 2018-06-26 RX ADMIN — Medication 20 MILLIGRAM(S): at 06:10

## 2018-06-26 RX ADMIN — Medication 500000 UNIT(S): at 06:09

## 2018-06-26 NOTE — DISCHARGE NOTE ADULT - MEDICATION SUMMARY - MEDICATIONS TO STOP TAKING
I will STOP taking the medications listed below when I get home from the hospital:  None I will STOP taking the medications listed below when I get home from the hospital:    methotrexate  -- injectable once a week    Vistaril 50 mg oral capsule  -- orally 2 times a day

## 2018-06-26 NOTE — DISCHARGE NOTE ADULT - PLAN OF CARE
Resolution Sepsis secondary to klebsiella-positive right middle lobe pneumonia to be treated outpatient with Augmentin for ?Days; will prescribe Prednisone 20mg x3 days followed by 10mg x3 days. Follow up with pulmonology in 3-4 weeks for further evaluation and treatment plans. Laryngeal candidiasis, resolved and recurred in hospital course. Follow up with ENT in 2 weeks to reassess candidiasis with laryngoscopy. Will prescribe Diflucan x10 days. Medical management Follow up with pulmonology for chronic disease management plan of care. Medical Management Blood pressure was high throughout hospital course. Please follow up with primary care provider for further management. Sepsis secondary to klebsiella-positive right middle lobe pneumonia to be treated outpatient with Augmentin for 7Days; will prescribe Prednisone 20mg x3 days followed by 10mg x3 days. Follow up with pulmonology in 3-4 weeks for further evaluation and treatment plans. Laryngeal candidiasis, resolved and recurred in hospital course. Follow up with ENT in 2 weeks to reassess candidiasis with laryngoscopy. Will prescribe Diflucan for 8 more days. Follow up with pulmonology for chronic disease management plan of care. Resume prednisone 20mg for 3 days then prednisone 10 mg for 3 more days. Use Duoneb as needed. Blood pressure was high throughout hospital course. Your Metoprolol was changed to 25mg daily, your Hydralazine dose remained the same and Nifedipine was added to your antihypertensive regimen. Please follow up with primary care provider for further management. Sepsis secondary to klebsiella-positive right middle lobe pneumonia to be treated outpatient with Augmentin for 7 more Days; will prescribe Prednisone 20mg x3 days followed by 10mg x3 days- for resolving copd exacerbation. Follow up with pulmonology in 3-4 weeks for further evaluation and treatment plans. Follow up with pulmonology for chronic disease management plan of care. Resume prednisone 20mg for 3 days then prednisone 10 mg for 3 more days. Use Duoneb as needed. continue with home breo and have rescue inhaler handy resolved resolved. was probably secondary to the initiation of amphotericin and this improved once the medication was changed improved secondary to klebsiella pneumonia right middle lobe which provoked COPD exacerbation. avoid smokers. complete your antibiotic regimen at home. follow with your pmd, and pulmonologist closely.

## 2018-06-26 NOTE — DISCHARGE NOTE ADULT - NS AS ACTIVITY OBS
Sex allowed rest to right heel and ice every 6 hours as needed. can obtain ankle and plantar support ace wrap at pharmacy

## 2018-06-26 NOTE — DISCHARGE NOTE ADULT - CARE PLAN
Principal Discharge DX:	Pneumonia of right lower lobe due to Klebsiella pneumoniae  Goal:	Resolution  Assessment and plan of treatment:	Sepsis secondary to klebsiella-positive right middle lobe pneumonia to be treated outpatient with Augmentin for ?Days; will prescribe Prednisone 20mg x3 days followed by 10mg x3 days. Follow up with pulmonology in 3-4 weeks for further evaluation and treatment plans.  Secondary Diagnosis:	Candida esophagitis  Goal:	Resolution  Assessment and plan of treatment:	Laryngeal candidiasis, resolved and recurred in hospital course. Follow up with ENT in 2 weeks to reassess candidiasis with laryngoscopy. Will prescribe Diflucan x10 days.  Secondary Diagnosis:	Chronic obstructive pulmonary disease, unspecified COPD type  Goal:	Medical management  Assessment and plan of treatment:	Follow up with pulmonology for chronic disease management plan of care.  Secondary Diagnosis:	HTN (hypertension)  Goal:	Medical Management  Assessment and plan of treatment:	Blood pressure was high throughout hospital course. Please follow up with primary care provider for further management. Principal Discharge DX:	Pneumonia of right lower lobe due to Klebsiella pneumoniae  Goal:	Resolution  Assessment and plan of treatment:	Sepsis secondary to klebsiella-positive right middle lobe pneumonia to be treated outpatient with Augmentin for 7Days; will prescribe Prednisone 20mg x3 days followed by 10mg x3 days. Follow up with pulmonology in 3-4 weeks for further evaluation and treatment plans.  Secondary Diagnosis:	Candida esophagitis  Goal:	Resolution  Assessment and plan of treatment:	Laryngeal candidiasis, resolved and recurred in hospital course. Follow up with ENT in 2 weeks to reassess candidiasis with laryngoscopy. Will prescribe Diflucan for 8 more days.  Secondary Diagnosis:	Chronic obstructive pulmonary disease, unspecified COPD type  Goal:	Medical management  Assessment and plan of treatment:	Follow up with pulmonology for chronic disease management plan of care. Resume prednisone 20mg for 3 days then prednisone 10 mg for 3 more days. Use Duoneb as needed.  Secondary Diagnosis:	HTN (hypertension)  Goal:	Medical Management  Assessment and plan of treatment:	Blood pressure was high throughout hospital course. Your Metoprolol was changed to 25mg daily, your Hydralazine dose remained the same and Nifedipine was added to your antihypertensive regimen. Please follow up with primary care provider for further management. Principal Discharge DX:	Pneumonia of right lower lobe due to Klebsiella pneumoniae  Goal:	Resolution  Assessment and plan of treatment:	Sepsis secondary to klebsiella-positive right middle lobe pneumonia to be treated outpatient with Augmentin for 7 more Days; will prescribe Prednisone 20mg x3 days followed by 10mg x3 days- for resolving copd exacerbation. Follow up with pulmonology in 3-4 weeks for further evaluation and treatment plans.  Secondary Diagnosis:	Candida esophagitis  Goal:	Resolution  Assessment and plan of treatment:	Laryngeal candidiasis, resolved and recurred in hospital course. Follow up with ENT in 2 weeks to reassess candidiasis with laryngoscopy. Will prescribe Diflucan for 8 more days.  Secondary Diagnosis:	Chronic obstructive pulmonary disease, unspecified COPD type  Goal:	Medical management  Assessment and plan of treatment:	Follow up with pulmonology for chronic disease management plan of care. Resume prednisone 20mg for 3 days then prednisone 10 mg for 3 more days. Use Duoneb as needed. continue with home breo and have rescue inhaler handy  Secondary Diagnosis:	HTN (hypertension)  Goal:	Medical Management  Assessment and plan of treatment:	Blood pressure was high throughout hospital course. Your Metoprolol was changed to 25mg daily, your Hydralazine dose remained the same and Nifedipine was added to your antihypertensive regimen. Please follow up with primary care provider for further management.  Secondary Diagnosis:	Acute kidney injury  Goal:	resolved  Assessment and plan of treatment:	resolved. was probably secondary to the initiation of amphotericin and this improved once the medication was changed  Secondary Diagnosis:	Gram negative sepsis  Goal:	improved  Assessment and plan of treatment:	secondary to klebsiella pneumonia right middle lobe which provoked COPD exacerbation. avoid smokers. complete your antibiotic regimen at home. follow with your pmd, and pulmonologist closely.

## 2018-06-26 NOTE — DISCHARGE NOTE ADULT - SECONDARY DIAGNOSIS.
Candida esophagitis Chronic obstructive pulmonary disease, unspecified COPD type HTN (hypertension) Acute kidney injury Gram negative sepsis

## 2018-06-26 NOTE — DISCHARGE NOTE ADULT - MEDICATION SUMMARY - MEDICATIONS TO CHANGE
I will SWITCH the dose or number of times a day I take the medications listed below when I get home from the hospital:    Toprol-XL 50 mg oral tablet, extended release  -- 1 tab(s) by mouth once a day I will SWITCH the dose or number of times a day I take the medications listed below when I get home from the hospital:    Toprol-XL 50 mg oral tablet, extended release  -- 1 tab(s) by mouth once a day    Xanax 1 mg oral tablet  -- orally every 4 hours, As Needed

## 2018-06-26 NOTE — DISCHARGE NOTE ADULT - MEDICATION SUMMARY - MEDICATIONS TO TAKE
I will START or STAY ON the medications listed below when I get home from the hospital:    prednisone 10mg oral tablet  -- 1 tab(s) by mouth once a day   -- Indication: For Chronic obstructive pulmonary disease, unspecified COPD type    predniSONE 20 mg oral tablet  -- 1 tab(s) by mouth once a day   -- It is very important that you take or use this exactly as directed.  Do not skip doses or discontinue unless directed by your doctor.  Obtain medical advice before taking any non-prescription drugs as some may affect the action of this medication.  Take with food or milk.    -- Indication: For Chronic obstructive pulmonary disease, unspecified COPD type    MS Contin 200 mg oral tablet, extended release  -- 1 tab(s) by mouth every 8 hours  -- Indication: For Chronic pain    Dilaudid 8 mg oral tablet  -- 1 tab(s) by mouth every 4 hours, As Needed  -- Indication: For Chronic pain    Xarelto 20 mg oral tablet  -- 1 tab(s) by mouth once a day (in the evening)  -- Indication: For History of DVT    gabapentin 300 mg oral tablet  -- orally 2 times a day  -- Indication: For Chronic pain    traZODone 100 mg oral tablet  -- 1 tab(s) by mouth once a day (at bedtime)  -- Indication: For Depression    Reglan 10 mg oral tablet  -- 1 tab(s) by mouth 3 times a day (before meals)  -- Indication: For Nausea, as needed    fluconazole 200 mg oral tablet  -- 1 tab(s) by mouth once a day  -- Indication: For CANDIDA ESOPHAGITIS    methotrexate  -- injectable once a week  -- Indication: For SLE and RA    Vistaril 50 mg oral capsule  -- orally 2 times a day  -- Indication: For Anxiety    Xanax 1 mg oral tablet  -- orally every 4 hours, As Needed  -- Indication: For Anxiety    metoprolol succinate 25 mg oral tablet, extended release  -- 1 tab(s) by mouth once a day  -- Indication: For HTN (hypertension)    ipratropium-albuterol 0.5 mg-2.5 mg/3 mLinhalation solution  -- 3 milliliter(s) inhaled every 6 hours  -- Indication: For Chronic obstructive pulmonary disease, unspecified COPD type    Breo Ellipta 100 mcg-25 mcg/inh inhalation powder  -- 1 puff(s) inhaled once a day  -- Indication: For Chronic obstructive pulmonary disease, unspecified COPD type    ProAir HFA 90 mcg/inh inhalation aerosol  -- 2 puff(s) inhaled 4 times a day, As Needed  -- Indication: For Chronic obstructive pulmonary disease, unspecified COPD type    NIFEdipine 30 mg oral tablet, extended release  -- 1 tab(s) by mouth once a day   -- Indication: For HTN (hypertension)    Benlysta  -- 200 milligram(s) intravenous once a week  -- Indication: For SLE and RA    amoxicillin-clavulanate 875 mg-125 mg oral tablet  -- 1 tab(s) by mouth every 12 hours   -- Finish all this medication unless otherwise directed by prescriber.  Take with food or milk.    -- Indication: For Pneumonia of right lower lobe due to Klebsiella pneumoniae    lactobacillus acidophilus oral capsule  -- 1 cap(s) by mouth once a day   -- Indication: For Prophylactic    Prevacid 30 mg oral delayed release capsule  -- 1 cap(s) by mouth once a day  -- Indication: For Prophylactic    hydrALAZINE 50 mg oral tablet  -- 1 tab(s) by mouth 2 times a day   -- Indication: For HTN (hypertension)    Vitamin B12 1000 mcg/mL injectable solution  -- injectable 2 times a week  -- Indication: For Anemia    folic acid 1 mg oral tablet  -- 1 tab(s) by mouth once a day  -- Indication: For Anemia I will START or STAY ON the medications listed below when I get home from the hospital:    prednisone 10mg oral tablet  -- 1 tab(s) by mouth once a day   -- Indication: For Chronic obstructive pulmonary disease, unspecified COPD type    predniSONE 20 mg oral tablet  -- 1 tab(s) by mouth once a day   -- It is very important that you take or use this exactly as directed.  Do not skip doses or discontinue unless directed by your doctor.  Obtain medical advice before taking any non-prescription drugs as some may affect the action of this medication.  Take with food or milk.    -- Indication: For Chronic obstructive pulmonary disease, unspecified COPD type    MS Contin 200 mg oral tablet, extended release  -- 1 tab(s) by mouth every 8 hours  -- Indication: For Chronic pain    Dilaudid 8 mg oral tablet  -- 1 tab(s) by mouth every 4 hours, As Needed  -- Indication: For Chronic pain    Xarelto 20 mg oral tablet  -- 1 tab(s) by mouth once a day (in the evening)  -- Indication: For History of DVT    gabapentin 300 mg oral tablet  -- orally 2 times a day  -- Indication: For Chronic pain    traZODone 100 mg oral tablet  -- 1 tab(s) by mouth once a day (at bedtime)  -- Indication: For Depression    Reglan 10 mg oral tablet  -- 1 tab(s) by mouth 3 times a day (before meals)  -- Indication: For Nausea, as needed    fluconazole 200 mg oral tablet  -- 1 tab(s) by mouth once a day  -- Indication: For CANDIDA ESOPHAGITIS    Xanax 1 mg oral tablet  -- 2 tab(s) by mouth every 8 hours, As Needed  -- Indication: For Anxiety    metoprolol succinate 25 mg oral tablet, extended release  -- 1 tab(s) by mouth once a day  -- Indication: For HTN (hypertension)    ipratropium-albuterol 0.5 mg-2.5 mg/3 mLinhalation solution  -- 3 milliliter(s) inhaled every 6 hours  -- Indication: For Chronic obstructive pulmonary disease, unspecified COPD type    Breo Ellipta 100 mcg-25 mcg/inh inhalation powder  -- 1 puff(s) inhaled once a day  -- Indication: For Chronic obstructive pulmonary disease, unspecified COPD type    ProAir HFA 90 mcg/inh inhalation aerosol  -- 2 puff(s) inhaled 4 times a day, As Needed  -- Indication: For Chronic obstructive pulmonary disease, unspecified COPD type    NIFEdipine 30 mg oral tablet, extended release  -- 1 tab(s) by mouth once a day   -- Indication: For HTN (hypertension)    Benlysta  -- 200 milligram(s) intravenous once a week  -- Indication: For SLE and RA    amoxicillin-clavulanate 875 mg-125 mg oral tablet  -- 1 tab(s) by mouth every 12 hours   -- Finish all this medication unless otherwise directed by prescriber.  Take with food or milk.    -- Indication: For Pneumonia of right lower lobe due to Klebsiella pneumoniae    lactobacillus acidophilus oral capsule  -- 1 cap(s) by mouth once a day   -- Indication: For Prophylactic    Prevacid 30 mg oral delayed release capsule  -- 1 cap(s) by mouth once a day  -- Indication: For Prophylactic    hydrALAZINE 50 mg oral tablet  -- 1 tab(s) by mouth 2 times a day   -- Indication: For HTN (hypertension)    Vitamin B12 1000 mcg/mL injectable solution  -- injectable 2 times a week  -- Indication: For Anemia    folic acid 1 mg oral tablet  -- 1 tab(s) by mouth once a day  -- Indication: For Anemia

## 2018-06-26 NOTE — DISCHARGE NOTE ADULT - PATIENT PORTAL LINK FT
You can access the [facility] Patient Portal, offered by [facility], by registering with the following website: [email or web address]

## 2018-06-26 NOTE — CHART NOTE - NSCHARTNOTEFT_GEN_A_CORE
Registered Dietitian Follow-Up     Patient Profile Reviewed                           Yes [x]   No []     Nutrition History Previously Obtained        Yes [x]  No []       Pertinent Subjective Information: Pt. resting in bed during visit, reports no improvement with appetite since last follow up. Pt. states she continues to feel sick and is "not herself". Pt. nibbles on foods during meals, but minimal intake at this time. Pt. tried Ensure pudding (chocolate) last week and enjoyed it. Pt. doesn't like any liquid supplements.       Pertinent Medical Interventions: Sepsis 2/2 Klebsiella right middle lobar pneumonia:  Augmentin as IV. Dysuria: UA was ordered and will order culture if positive. Laryngeal Candidiasis: fluconazole.  N/V and abdominal pain- resolved. Thrombophlebitis with adjacent cellulitis- improved. Throat Cancer- not on chemo, outpatient follow up. HTN controlled.        Diet order: regular, low Na      Anthropometrics:  - Ht. 160.02cm  - Wt. 78.6kg on 6/26 vs 77.2kg on 6/22- relatively stable ( edema noted)  - %wt change  - BMI 29.5       Pertinent Lab Data: (6/25) RBC 3.8, Hg 10.5, Hct 32.7, BUN 22, glu 157     Pertinent Meds: Lactobacillus, Metoprolol, Pepto Bismol, MagOx, Senna, Colace, Protonix     Physical Findings:  - Appearance: alert&oriented, 3+ edema to L, R ankle, 2+ L foot  - GI function: denies symptoms, last BM 6/24  - Tubes: no tubes noted  - Oral/Mouth cavity:   - Skin: ecchymosis (BS 22)     Nutrition Requirements (from RD note 6/22)  Weight Used:     Estimated Energy Needs    Continue [x]  Adjust [] 1545-1686kcal  Adjusted Energy Recommendations:   kcal/day        Estimated Protein Needs    Continue [x]  Adjust [] 52-63g  Adjusted Protein Recommendations:   gm/day        Estimated Fluid Needs        Continue [x]  Adjust [] 1ml/kcal  Adjusted Fluid Recommendations:   mL/day     Nutrient Intake: 0-25% at meals        [] Previous Nutrition Diagnosis: Inadequate oral intake             [x] Ongoing          [] Resolved       Nutrition Intervention: meals and snacks, medical food supplement, vitamin and mineral supplement     Rec: continue regular, low Na diet, order Ensure pudding BID, consider MVI daily        Goal/Expected Outcome: In 3 days pt. to consume at least 50% PO and supplement      Indicator/Monitoring: energy intake, body composition, NFPF,

## 2018-06-26 NOTE — DISCHARGE NOTE ADULT - CARE PROVIDER_API CALL
En Chaney), Medicine  [address] [place] [address]  Phone: [phone]  Fax: [phone]    Andre Hurst), Critical Care Medicine; Internal Medicine; Pulmonary Disease; Sleep Medicine  [address] [place] [address]  Phone: [phone]  Fax: [phone]    Power Hanna), Surgical Physicians  [address] [place] [address]  [address] [place] [address]  Phone: [phone]  Fax: [phone]

## 2018-06-26 NOTE — DISCHARGE NOTE ADULT - OTHER SIGNIFICANT FINDINGS
< from: CT Abdomen and Pelvis w/ Oral Cont and w/ IV Cont (06.15.18 @ 14:09) >  IMPRESSION:     1.  No evidence of acute/inflammatory process within the abdomen or   pelvis.      2.  Punctate nonobstructing left renal interpolar calculus.      < end of copied text >

## 2018-06-27 LAB
CULTURE RESULTS: SIGNIFICANT CHANGE UP
SPECIMEN SOURCE: SIGNIFICANT CHANGE UP

## 2018-06-28 LAB
CULTURE RESULTS: SIGNIFICANT CHANGE UP
SPECIMEN SOURCE: SIGNIFICANT CHANGE UP

## 2018-07-01 DIAGNOSIS — J44.1 CHRONIC OBSTRUCTIVE PULMONARY DISEASE WITH (ACUTE) EXACERBATION: ICD-10-CM

## 2018-07-01 DIAGNOSIS — Z85.21 PERSONAL HISTORY OF MALIGNANT NEOPLASM OF LARYNX: ICD-10-CM

## 2018-07-01 DIAGNOSIS — N17.9 ACUTE KIDNEY FAILURE, UNSPECIFIED: ICD-10-CM

## 2018-07-01 DIAGNOSIS — I50.22 CHRONIC SYSTOLIC (CONGESTIVE) HEART FAILURE: ICD-10-CM

## 2018-07-01 DIAGNOSIS — F17.200 NICOTINE DEPENDENCE, UNSPECIFIED, UNCOMPLICATED: ICD-10-CM

## 2018-07-01 DIAGNOSIS — G89.29 OTHER CHRONIC PAIN: ICD-10-CM

## 2018-07-01 DIAGNOSIS — Z88.8 ALLERGY STATUS TO OTHER DRUGS, MEDICAMENTS AND BIOLOGICAL SUBSTANCES STATUS: ICD-10-CM

## 2018-07-01 DIAGNOSIS — Z95.810 PRESENCE OF AUTOMATIC (IMPLANTABLE) CARDIAC DEFIBRILLATOR: ICD-10-CM

## 2018-07-01 DIAGNOSIS — I13.0 HYPERTENSIVE HEART AND CHRONIC KIDNEY DISEASE WITH HEART FAILURE AND STAGE 1 THROUGH STAGE 4 CHRONIC KIDNEY DISEASE, OR UNSPECIFIED CHRONIC KIDNEY DISEASE: ICD-10-CM

## 2018-07-01 DIAGNOSIS — B37.89 OTHER SITES OF CANDIDIASIS: ICD-10-CM

## 2018-07-01 DIAGNOSIS — B37.81 CANDIDAL ESOPHAGITIS: ICD-10-CM

## 2018-07-01 DIAGNOSIS — N18.2 CHRONIC KIDNEY DISEASE, STAGE 2 (MILD): ICD-10-CM

## 2018-07-01 DIAGNOSIS — I82.409 ACUTE EMBOLISM AND THROMBOSIS OF UNSPECIFIED DEEP VEINS OF UNSPECIFIED LOWER EXTREMITY: ICD-10-CM

## 2018-07-01 DIAGNOSIS — A41.50 GRAM-NEGATIVE SEPSIS, UNSPECIFIED: ICD-10-CM

## 2018-07-01 DIAGNOSIS — M32.9 SYSTEMIC LUPUS ERYTHEMATOSUS, UNSPECIFIED: ICD-10-CM

## 2018-07-01 DIAGNOSIS — F41.9 ANXIETY DISORDER, UNSPECIFIED: ICD-10-CM

## 2018-07-01 DIAGNOSIS — J04.0 ACUTE LARYNGITIS: ICD-10-CM

## 2018-07-01 DIAGNOSIS — Z79.01 LONG TERM (CURRENT) USE OF ANTICOAGULANTS: ICD-10-CM

## 2018-07-01 DIAGNOSIS — L03.114 CELLULITIS OF LEFT UPPER LIMB: ICD-10-CM

## 2018-07-01 DIAGNOSIS — K29.70 GASTRITIS, UNSPECIFIED, WITHOUT BLEEDING: ICD-10-CM

## 2018-07-01 DIAGNOSIS — E87.5 HYPERKALEMIA: ICD-10-CM

## 2018-07-01 DIAGNOSIS — J15.0 PNEUMONIA DUE TO KLEBSIELLA PNEUMONIAE: ICD-10-CM

## 2018-07-01 DIAGNOSIS — R04.2 HEMOPTYSIS: ICD-10-CM

## 2018-07-01 DIAGNOSIS — Z86.718 PERSONAL HISTORY OF OTHER VENOUS THROMBOSIS AND EMBOLISM: ICD-10-CM

## 2018-07-01 DIAGNOSIS — M06.9 RHEUMATOID ARTHRITIS, UNSPECIFIED: ICD-10-CM

## 2018-07-25 ENCOUNTER — APPOINTMENT (OUTPATIENT)
Dept: OTOLARYNGOLOGY | Facility: CLINIC | Age: 48
End: 2018-07-25

## 2018-09-04 ENCOUNTER — APPOINTMENT (OUTPATIENT)
Dept: OTOLARYNGOLOGY | Facility: CLINIC | Age: 48
End: 2018-09-04

## 2018-10-17 ENCOUNTER — APPOINTMENT (OUTPATIENT)
Dept: OTOLARYNGOLOGY | Facility: CLINIC | Age: 48
End: 2018-10-17

## 2018-11-16 ENCOUNTER — APPOINTMENT (OUTPATIENT)
Dept: OTOLARYNGOLOGY | Facility: CLINIC | Age: 48
End: 2018-11-16

## 2018-11-26 ENCOUNTER — APPOINTMENT (OUTPATIENT)
Dept: OTOLARYNGOLOGY | Facility: CLINIC | Age: 48
End: 2018-11-26

## 2018-12-02 NOTE — ED ADULT NURSE NOTE - NSSISCREENINGQ1_ED_A_ED
52F with PMHx, DMII, hypothyroidism, ANISHA on CPAP, depression, gastritis, COPD, crohns disease, chronic back pain, migraines, chronic dizziness presents for syncopal episode and dizziness    #Syncope and dizziness  - Likely secondary to polypharmacy vs neuro mediated vs orthostatic vs cardiovascular  - Neuro recs appreciated  - CTH NC and MRA brain negative  - c/w tele for 24 hrs if no events can d/c  - Will check 2d echo, no recent   - Cardiology eval  - Orthostatics are negative.  - change Meclizine to Phenergan once cleared by psychiatry 25mg po bid prn  - Psych evaluation to review meds  - f/u with neurology as OP after d/c  - midodrine increased to 10mg TID     #DMII  - c/w insulin regimen     #ANISHA on cpap  - continue at 12    #hypothyroidism  - c/w synthroid     #Depression/schizophrenia  - c/w current home meds.   - Psych eval pending     # gastritis  - c/w protonix    # COPD  - prn nebs     #chronic back pain  - nsaids prn     #migraines  - triptans prn     #Crohns disease  - c/w home meds     DVT PPx: Lovenox  GI PPx: protonix   PT rehab   Dispo: from home with [facility] No

## 2018-12-17 ENCOUNTER — APPOINTMENT (OUTPATIENT)
Dept: OTOLARYNGOLOGY | Facility: CLINIC | Age: 48
End: 2018-12-17
Payer: MEDICARE

## 2018-12-17 VITALS
SYSTOLIC BLOOD PRESSURE: 127 MMHG | BODY MASS INDEX: 30.12 KG/M2 | DIASTOLIC BLOOD PRESSURE: 84 MMHG | HEIGHT: 63 IN | WEIGHT: 170 LBS

## 2018-12-17 PROCEDURE — 31575 DIAGNOSTIC LARYNGOSCOPY: CPT

## 2018-12-17 PROCEDURE — 99214 OFFICE O/P EST MOD 30 MIN: CPT | Mod: 25

## 2019-02-08 ENCOUNTER — OUTPATIENT (OUTPATIENT)
Dept: OUTPATIENT SERVICES | Facility: HOSPITAL | Age: 49
LOS: 1 days | Discharge: HOME | End: 2019-02-08

## 2019-02-08 DIAGNOSIS — J44.9 CHRONIC OBSTRUCTIVE PULMONARY DISEASE, UNSPECIFIED: ICD-10-CM

## 2019-02-08 DIAGNOSIS — R06.00 DYSPNEA, UNSPECIFIED: ICD-10-CM

## 2019-02-08 DIAGNOSIS — Z90.49 ACQUIRED ABSENCE OF OTHER SPECIFIED PARTS OF DIGESTIVE TRACT: Chronic | ICD-10-CM

## 2019-02-08 DIAGNOSIS — Z85.21 PERSONAL HISTORY OF MALIGNANT NEOPLASM OF LARYNX: Chronic | ICD-10-CM

## 2019-02-22 ENCOUNTER — APPOINTMENT (OUTPATIENT)
Dept: OTOLARYNGOLOGY | Facility: CLINIC | Age: 49
End: 2019-02-22
Payer: MEDICARE

## 2019-02-22 VITALS
DIASTOLIC BLOOD PRESSURE: 85 MMHG | SYSTOLIC BLOOD PRESSURE: 124 MMHG | BODY MASS INDEX: 30.12 KG/M2 | HEIGHT: 63 IN | WEIGHT: 170 LBS

## 2019-02-22 DIAGNOSIS — T48.5X1A CHRONIC RHINITIS: ICD-10-CM

## 2019-02-22 DIAGNOSIS — R04.0 EPISTAXIS: ICD-10-CM

## 2019-02-22 DIAGNOSIS — J31.0 CHRONIC RHINITIS: ICD-10-CM

## 2019-02-22 PROCEDURE — 99214 OFFICE O/P EST MOD 30 MIN: CPT | Mod: 25

## 2019-02-22 PROCEDURE — 31575 DIAGNOSTIC LARYNGOSCOPY: CPT

## 2019-02-22 RX ORDER — FLUTICASONE PROPIONATE 50 UG/1
50 SPRAY, METERED NASAL
Qty: 1 | Refills: 4 | Status: ACTIVE | COMMUNITY
Start: 2019-02-22 | End: 1900-01-01

## 2019-02-22 NOTE — HISTORY OF PRESENT ILLNESS
[de-identified] : Patient returns to the office to evaluate yeast pharyngitis. Pt completed CT of the neck; results and images were reviewed with patient in details today.  Patient continues to use Lansoprazole and Nystatin swish and swallow. \par Also complaining of left sided epistaxis for the last 4 days.

## 2019-02-22 NOTE — ASSESSMENT
[FreeTextEntry1] : Bacitracin in the nose.\par Stop afrin.\par \par Will watch the nasopharynx for now. Possible PET CT if no improvement. RTC in 1Month\par \par

## 2019-03-29 ENCOUNTER — APPOINTMENT (OUTPATIENT)
Dept: OTOLARYNGOLOGY | Facility: CLINIC | Age: 49
End: 2019-03-29

## 2019-04-17 ENCOUNTER — OUTPATIENT (OUTPATIENT)
Dept: OUTPATIENT SERVICES | Facility: HOSPITAL | Age: 49
LOS: 1 days | Discharge: HOME | End: 2019-04-17
Payer: MEDICARE

## 2019-04-17 ENCOUNTER — TRANSCRIPTION ENCOUNTER (OUTPATIENT)
Age: 49
End: 2019-04-17

## 2019-04-17 ENCOUNTER — RESULT REVIEW (OUTPATIENT)
Age: 49
End: 2019-04-17

## 2019-04-17 VITALS
DIASTOLIC BLOOD PRESSURE: 88 MMHG | SYSTOLIC BLOOD PRESSURE: 142 MMHG | HEIGHT: 62 IN | TEMPERATURE: 99 F | WEIGHT: 160.06 LBS | RESPIRATION RATE: 18 BRPM | HEART RATE: 105 BPM

## 2019-04-17 VITALS — HEART RATE: 91 BPM | DIASTOLIC BLOOD PRESSURE: 87 MMHG | RESPIRATION RATE: 18 BRPM | SYSTOLIC BLOOD PRESSURE: 140 MMHG

## 2019-04-17 DIAGNOSIS — Z85.21 PERSONAL HISTORY OF MALIGNANT NEOPLASM OF LARYNX: Chronic | ICD-10-CM

## 2019-04-17 DIAGNOSIS — Z90.49 ACQUIRED ABSENCE OF OTHER SPECIFIED PARTS OF DIGESTIVE TRACT: Chronic | ICD-10-CM

## 2019-04-17 PROCEDURE — 88312 SPECIAL STAINS GROUP 1: CPT | Mod: 26

## 2019-04-17 PROCEDURE — 88305 TISSUE EXAM BY PATHOLOGIST: CPT | Mod: 26

## 2019-04-17 RX ORDER — ALBUTEROL 90 UG/1
2 AEROSOL, METERED ORAL
Qty: 0 | Refills: 0 | COMMUNITY

## 2019-04-17 RX ORDER — METOCLOPRAMIDE HCL 10 MG
1 TABLET ORAL
Qty: 0 | Refills: 0 | COMMUNITY

## 2019-04-17 RX ORDER — TRAZODONE HCL 50 MG
1 TABLET ORAL
Qty: 0 | Refills: 0 | COMMUNITY

## 2019-04-17 NOTE — H&P PST ADULT - NSICDXPASTMEDICALHX_GEN_ALL_CORE_FT
PAST MEDICAL HISTORY:  AICD (automatic cardioverter/defibrillator) present     CHF (congestive heart failure)     COPD (chronic obstructive pulmonary disease)     DVT (deep venous thrombosis)     HTN (hypertension)     Lupus     RA (rheumatoid arthritis)     Throat cancer

## 2019-04-19 LAB — SURGICAL PATHOLOGY STUDY: SIGNIFICANT CHANGE UP

## 2019-04-24 DIAGNOSIS — K29.50 UNSPECIFIED CHRONIC GASTRITIS WITHOUT BLEEDING: ICD-10-CM

## 2019-04-24 DIAGNOSIS — K21.9 GASTRO-ESOPHAGEAL REFLUX DISEASE WITHOUT ESOPHAGITIS: ICD-10-CM

## 2019-04-24 DIAGNOSIS — K44.9 DIAPHRAGMATIC HERNIA WITHOUT OBSTRUCTION OR GANGRENE: ICD-10-CM

## 2019-04-30 ENCOUNTER — OTHER (OUTPATIENT)
Age: 49
End: 2019-04-30

## 2019-05-10 ENCOUNTER — APPOINTMENT (OUTPATIENT)
Dept: OTOLARYNGOLOGY | Facility: CLINIC | Age: 49
End: 2019-05-10
Payer: MEDICARE

## 2019-05-10 DIAGNOSIS — J38.3 OTHER DISEASES OF VOCAL CORDS: ICD-10-CM

## 2019-05-10 PROCEDURE — 31575 DIAGNOSTIC LARYNGOSCOPY: CPT

## 2019-05-10 PROCEDURE — 99214 OFFICE O/P EST MOD 30 MIN: CPT | Mod: 25

## 2019-05-10 NOTE — HISTORY OF PRESENT ILLNESS
[de-identified] : Patient returns to the office to evaluate SLE and hoarseness. \par She has been hoarse, unable to swallow solids , and in throat pain for a week. She started Diflucan and nystatin, feels better now.

## 2019-05-31 ENCOUNTER — APPOINTMENT (OUTPATIENT)
Dept: OTOLARYNGOLOGY | Facility: CLINIC | Age: 49
End: 2019-05-31

## 2019-06-03 ENCOUNTER — APPOINTMENT (OUTPATIENT)
Dept: CARDIOLOGY | Facility: CLINIC | Age: 49
End: 2019-06-03
Payer: MEDICARE

## 2019-06-03 DIAGNOSIS — I49.9 CARDIAC ARRHYTHMIA, UNSPECIFIED: ICD-10-CM

## 2019-06-03 PROCEDURE — 93298 REM INTERROG DEV EVAL SCRMS: CPT

## 2019-06-03 PROCEDURE — 93299: CPT

## 2019-06-10 ENCOUNTER — APPOINTMENT (OUTPATIENT)
Dept: OTOLARYNGOLOGY | Facility: CLINIC | Age: 49
End: 2019-06-10

## 2019-06-17 PROBLEM — I49.9 CARDIAC ARRHYTHMIA, UNSPECIFIED CARDIAC ARRHYTHMIA TYPE: Status: ACTIVE | Noted: 2017-06-26

## 2019-06-19 ENCOUNTER — INPATIENT (INPATIENT)
Facility: HOSPITAL | Age: 49
LOS: 3 days | Discharge: HOME | End: 2019-06-23
Attending: INTERNAL MEDICINE | Admitting: INTERNAL MEDICINE
Payer: MEDICARE

## 2019-06-19 VITALS
RESPIRATION RATE: 22 BRPM | TEMPERATURE: 98 F | DIASTOLIC BLOOD PRESSURE: 131 MMHG | HEART RATE: 132 BPM | OXYGEN SATURATION: 99 % | SYSTOLIC BLOOD PRESSURE: 167 MMHG

## 2019-06-19 DIAGNOSIS — Z85.21 PERSONAL HISTORY OF MALIGNANT NEOPLASM OF LARYNX: Chronic | ICD-10-CM

## 2019-06-19 DIAGNOSIS — Z90.49 ACQUIRED ABSENCE OF OTHER SPECIFIED PARTS OF DIGESTIVE TRACT: Chronic | ICD-10-CM

## 2019-06-19 LAB
ALBUMIN SERPL ELPH-MCNC: 4.2 G/DL — SIGNIFICANT CHANGE UP (ref 3.5–5.2)
ALP SERPL-CCNC: 83 U/L — SIGNIFICANT CHANGE UP (ref 30–115)
ALT FLD-CCNC: 9 U/L — SIGNIFICANT CHANGE UP (ref 0–41)
ANION GAP SERPL CALC-SCNC: 11 MMOL/L — SIGNIFICANT CHANGE UP (ref 7–14)
APPEARANCE UR: ABNORMAL
APTT BLD: 54.6 SEC — HIGH (ref 27–39.2)
AST SERPL-CCNC: 15 U/L — SIGNIFICANT CHANGE UP (ref 0–41)
BACTERIA # UR AUTO: ABNORMAL /HPF
BASE EXCESS BLDV CALC-SCNC: 3.6 MMOL/L — HIGH (ref -2–2)
BASOPHILS # BLD AUTO: 0.04 K/UL — SIGNIFICANT CHANGE UP (ref 0–0.2)
BASOPHILS NFR BLD AUTO: 0.6 % — SIGNIFICANT CHANGE UP (ref 0–1)
BILIRUB SERPL-MCNC: 0.3 MG/DL — SIGNIFICANT CHANGE UP (ref 0.2–1.2)
BILIRUB UR-MCNC: ABNORMAL
BUN SERPL-MCNC: 11 MG/DL — SIGNIFICANT CHANGE UP (ref 10–20)
CA-I SERPL-SCNC: 1.2 MMOL/L — SIGNIFICANT CHANGE UP (ref 1.12–1.3)
CALCIUM SERPL-MCNC: 9.3 MG/DL — SIGNIFICANT CHANGE UP (ref 8.5–10.1)
CHLORIDE SERPL-SCNC: 99 MMOL/L — SIGNIFICANT CHANGE UP (ref 98–110)
CO2 SERPL-SCNC: 27 MMOL/L — SIGNIFICANT CHANGE UP (ref 17–32)
COLOR SPEC: SIGNIFICANT CHANGE UP
CREAT SERPL-MCNC: 1.1 MG/DL — SIGNIFICANT CHANGE UP (ref 0.7–1.5)
D DIMER BLD IA.RAPID-MCNC: 176 NG/ML DDU — SIGNIFICANT CHANGE UP (ref 0–230)
DIFF PNL FLD: ABNORMAL
EOSINOPHIL # BLD AUTO: 0.13 K/UL — SIGNIFICANT CHANGE UP (ref 0–0.7)
EOSINOPHIL NFR BLD AUTO: 1.9 % — SIGNIFICANT CHANGE UP (ref 0–8)
EPI CELLS # UR: ABNORMAL /HPF
GAS PNL BLDV: 139 MMOL/L — SIGNIFICANT CHANGE UP (ref 136–145)
GAS PNL BLDV: SIGNIFICANT CHANGE UP
GLUCOSE SERPL-MCNC: 105 MG/DL — HIGH (ref 70–99)
GLUCOSE UR QL: NEGATIVE MG/DL — SIGNIFICANT CHANGE UP
HCO3 BLDV-SCNC: 31 MMOL/L — HIGH (ref 22–29)
HCT VFR BLD CALC: 41.2 % — SIGNIFICANT CHANGE UP (ref 37–47)
HCT VFR BLDA CALC: 41.3 % — SIGNIFICANT CHANGE UP (ref 34–44)
HGB BLD CALC-MCNC: 13.5 G/DL — LOW (ref 14–18)
HGB BLD-MCNC: 13.5 G/DL — SIGNIFICANT CHANGE UP (ref 12–16)
IMM GRANULOCYTES NFR BLD AUTO: 0.3 % — SIGNIFICANT CHANGE UP (ref 0.1–0.3)
INR BLD: 1.02 RATIO — SIGNIFICANT CHANGE UP (ref 0.65–1.3)
KETONES UR-MCNC: ABNORMAL
LACTATE BLDV-MCNC: 1.1 MMOL/L — SIGNIFICANT CHANGE UP (ref 0.5–1.6)
LEUKOCYTE ESTERASE UR-ACNC: ABNORMAL
LIDOCAIN IGE QN: 7 U/L — SIGNIFICANT CHANGE UP (ref 7–60)
LYMPHOCYTES # BLD AUTO: 2.2 K/UL — SIGNIFICANT CHANGE UP (ref 1.2–3.4)
LYMPHOCYTES # BLD AUTO: 32.3 % — SIGNIFICANT CHANGE UP (ref 20.5–51.1)
MAGNESIUM SERPL-MCNC: 2 MG/DL — SIGNIFICANT CHANGE UP (ref 1.8–2.4)
MCHC RBC-ENTMCNC: 28.6 PG — SIGNIFICANT CHANGE UP (ref 27–31)
MCHC RBC-ENTMCNC: 32.8 G/DL — SIGNIFICANT CHANGE UP (ref 32–37)
MCV RBC AUTO: 87.3 FL — SIGNIFICANT CHANGE UP (ref 81–99)
MONOCYTES # BLD AUTO: 0.45 K/UL — SIGNIFICANT CHANGE UP (ref 0.1–0.6)
MONOCYTES NFR BLD AUTO: 6.6 % — SIGNIFICANT CHANGE UP (ref 1.7–9.3)
NEUTROPHILS # BLD AUTO: 3.97 K/UL — SIGNIFICANT CHANGE UP (ref 1.4–6.5)
NEUTROPHILS NFR BLD AUTO: 58.3 % — SIGNIFICANT CHANGE UP (ref 42.2–75.2)
NITRITE UR-MCNC: NEGATIVE — SIGNIFICANT CHANGE UP
NRBC # BLD: 0 /100 WBCS — SIGNIFICANT CHANGE UP (ref 0–0)
NT-PROBNP SERPL-SCNC: 331 PG/ML — HIGH (ref 0–300)
PCO2 BLDV: 55 MMHG — HIGH (ref 41–51)
PH BLDV: 7.35 — SIGNIFICANT CHANGE UP (ref 7.26–7.43)
PH UR: 5.5 — SIGNIFICANT CHANGE UP (ref 5–8)
PLATELET # BLD AUTO: 165 K/UL — SIGNIFICANT CHANGE UP (ref 130–400)
PO2 BLDV: 29 MMHG — SIGNIFICANT CHANGE UP (ref 20–40)
POTASSIUM BLDV-SCNC: 3.8 MMOL/L — SIGNIFICANT CHANGE UP (ref 3.3–5.6)
POTASSIUM SERPL-MCNC: 4.1 MMOL/L — SIGNIFICANT CHANGE UP (ref 3.5–5)
POTASSIUM SERPL-SCNC: 4.1 MMOL/L — SIGNIFICANT CHANGE UP (ref 3.5–5)
PROT SERPL-MCNC: 7.1 G/DL — SIGNIFICANT CHANGE UP (ref 6–8)
PROT UR-MCNC: 30 MG/DL
PROTHROM AB SERPL-ACNC: 11.7 SEC — SIGNIFICANT CHANGE UP (ref 9.95–12.87)
RBC # BLD: 4.72 M/UL — SIGNIFICANT CHANGE UP (ref 4.2–5.4)
RBC # FLD: 13.5 % — SIGNIFICANT CHANGE UP (ref 11.5–14.5)
RBC CASTS # UR COMP ASSIST: ABNORMAL /HPF
SAO2 % BLDV: 63 % — SIGNIFICANT CHANGE UP
SODIUM SERPL-SCNC: 137 MMOL/L — SIGNIFICANT CHANGE UP (ref 135–146)
SP GR SPEC: 1.02 — SIGNIFICANT CHANGE UP (ref 1.01–1.03)
TROPONIN T SERPL-MCNC: <0.01 NG/ML — SIGNIFICANT CHANGE UP
UROBILINOGEN FLD QL: 1 MG/DL (ref 0.2–0.2)
WBC # BLD: 6.81 K/UL — SIGNIFICANT CHANGE UP (ref 4.8–10.8)
WBC # FLD AUTO: 6.81 K/UL — SIGNIFICANT CHANGE UP (ref 4.8–10.8)
WBC UR QL: ABNORMAL /HPF

## 2019-06-19 PROCEDURE — 99285 EMERGENCY DEPT VISIT HI MDM: CPT

## 2019-06-19 PROCEDURE — 93010 ELECTROCARDIOGRAM REPORT: CPT

## 2019-06-19 RX ORDER — SODIUM CHLORIDE 9 MG/ML
1000 INJECTION INTRAMUSCULAR; INTRAVENOUS; SUBCUTANEOUS ONCE
Refills: 0 | Status: COMPLETED | OUTPATIENT
Start: 2019-06-19 | End: 2019-06-19

## 2019-06-19 RX ADMIN — SODIUM CHLORIDE 1000 MILLILITER(S): 9 INJECTION INTRAMUSCULAR; INTRAVENOUS; SUBCUTANEOUS at 23:06

## 2019-06-19 NOTE — ED ADULT NURSE NOTE - OBJECTIVE STATEMENT
pt brought for s/p syncope and palpitations.
Abdomen soft, nontender, nondistended, bowel sounds present in all 4 quadrants.

## 2019-06-20 DIAGNOSIS — M54.5 LOW BACK PAIN: ICD-10-CM

## 2019-06-20 LAB — TROPONIN T SERPL-MCNC: <0.01 NG/ML — SIGNIFICANT CHANGE UP

## 2019-06-20 PROCEDURE — 70450 CT HEAD/BRAIN W/O DYE: CPT | Mod: 26

## 2019-06-20 PROCEDURE — 99222 1ST HOSP IP/OBS MODERATE 55: CPT | Mod: 25

## 2019-06-20 PROCEDURE — 93282 PRGRMG EVAL IMPLANTABLE DFB: CPT | Mod: 26

## 2019-06-20 PROCEDURE — 71045 X-RAY EXAM CHEST 1 VIEW: CPT | Mod: 26

## 2019-06-20 PROCEDURE — 74177 CT ABD & PELVIS W/CONTRAST: CPT | Mod: 26

## 2019-06-20 PROCEDURE — 99223 1ST HOSP IP/OBS HIGH 75: CPT | Mod: AI

## 2019-06-20 PROCEDURE — 71275 CT ANGIOGRAPHY CHEST: CPT | Mod: 26

## 2019-06-20 RX ORDER — ZINC SULFATE TAB 220 MG (50 MG ZINC EQUIVALENT) 220 (50 ZN) MG
220 TAB ORAL DAILY
Refills: 0 | Status: DISCONTINUED | OUTPATIENT
Start: 2019-06-20 | End: 2019-06-23

## 2019-06-20 RX ORDER — MORPHINE SULFATE 50 MG/1
6 CAPSULE, EXTENDED RELEASE ORAL ONCE
Refills: 0 | Status: DISCONTINUED | OUTPATIENT
Start: 2019-06-20 | End: 2019-06-20

## 2019-06-20 RX ORDER — HYDROMORPHONE HYDROCHLORIDE 2 MG/ML
1 INJECTION INTRAMUSCULAR; INTRAVENOUS; SUBCUTANEOUS
Qty: 0 | Refills: 0 | DISCHARGE

## 2019-06-20 RX ORDER — FOLIC ACID 0.8 MG
1 TABLET ORAL DAILY
Refills: 0 | Status: DISCONTINUED | OUTPATIENT
Start: 2019-06-20 | End: 2019-06-23

## 2019-06-20 RX ORDER — RIVAROXABAN 15 MG-20MG
20 KIT ORAL
Refills: 0 | Status: DISCONTINUED | OUTPATIENT
Start: 2019-06-20 | End: 2019-06-23

## 2019-06-20 RX ORDER — HYDROMORPHONE HYDROCHLORIDE 2 MG/ML
4 INJECTION INTRAMUSCULAR; INTRAVENOUS; SUBCUTANEOUS THREE TIMES A DAY
Refills: 0 | Status: DISCONTINUED | OUTPATIENT
Start: 2019-06-20 | End: 2019-06-21

## 2019-06-20 RX ORDER — LIDOCAINE 4 G/100G
1 CREAM TOPICAL DAILY
Refills: 0 | Status: DISCONTINUED | OUTPATIENT
Start: 2019-06-20 | End: 2019-06-23

## 2019-06-20 RX ORDER — LANSOPRAZOLE 15 MG/1
1 CAPSULE, DELAYED RELEASE ORAL
Qty: 0 | Refills: 0 | DISCHARGE

## 2019-06-20 RX ORDER — ACETAMINOPHEN 500 MG
650 TABLET ORAL EVERY 6 HOURS
Refills: 0 | Status: DISCONTINUED | OUTPATIENT
Start: 2019-06-20 | End: 2019-06-23

## 2019-06-20 RX ORDER — IPRATROPIUM/ALBUTEROL SULFATE 18-103MCG
3 AEROSOL WITH ADAPTER (GRAM) INHALATION EVERY 6 HOURS
Refills: 0 | Status: DISCONTINUED | OUTPATIENT
Start: 2019-06-20 | End: 2019-06-23

## 2019-06-20 RX ORDER — MORPHINE SULFATE 50 MG/1
1 CAPSULE, EXTENDED RELEASE ORAL
Qty: 0 | Refills: 0 | DISCHARGE

## 2019-06-20 RX ORDER — MORPHINE SULFATE 50 MG/1
100 CAPSULE, EXTENDED RELEASE ORAL ONCE
Refills: 0 | Status: DISCONTINUED | OUTPATIENT
Start: 2019-06-20 | End: 2019-06-20

## 2019-06-20 RX ORDER — ALPRAZOLAM 0.25 MG
2 TABLET ORAL EVERY 8 HOURS
Refills: 0 | Status: DISCONTINUED | OUTPATIENT
Start: 2019-06-20 | End: 2019-06-23

## 2019-06-20 RX ORDER — HYDROMORPHONE HYDROCHLORIDE 2 MG/ML
4 INJECTION INTRAMUSCULAR; INTRAVENOUS; SUBCUTANEOUS THREE TIMES A DAY
Refills: 0 | Status: DISCONTINUED | OUTPATIENT
Start: 2019-06-20 | End: 2019-06-20

## 2019-06-20 RX ORDER — GABAPENTIN 400 MG/1
0 CAPSULE ORAL
Qty: 0 | Refills: 0 | DISCHARGE

## 2019-06-20 RX ORDER — MORPHINE SULFATE 50 MG/1
100 CAPSULE, EXTENDED RELEASE ORAL
Refills: 0 | Status: DISCONTINUED | OUTPATIENT
Start: 2019-06-20 | End: 2019-06-20

## 2019-06-20 RX ORDER — HYDRALAZINE HCL 50 MG
50 TABLET ORAL DAILY
Refills: 0 | Status: DISCONTINUED | OUTPATIENT
Start: 2019-06-20 | End: 2019-06-23

## 2019-06-20 RX ORDER — MORPHINE SULFATE 50 MG/1
60 CAPSULE, EXTENDED RELEASE ORAL EVERY 12 HOURS
Refills: 0 | Status: DISCONTINUED | OUTPATIENT
Start: 2019-06-20 | End: 2019-06-21

## 2019-06-20 RX ORDER — METOPROLOL TARTRATE 50 MG
25 TABLET ORAL DAILY
Refills: 0 | Status: DISCONTINUED | OUTPATIENT
Start: 2019-06-20 | End: 2019-06-23

## 2019-06-20 RX ADMIN — MORPHINE SULFATE 60 MILLIGRAM(S): 50 CAPSULE, EXTENDED RELEASE ORAL at 18:45

## 2019-06-20 RX ADMIN — Medication 2 MILLIGRAM(S): at 12:13

## 2019-06-20 RX ADMIN — Medication 1 MILLIGRAM(S): at 12:00

## 2019-06-20 RX ADMIN — MORPHINE SULFATE 6 MILLIGRAM(S): 50 CAPSULE, EXTENDED RELEASE ORAL at 02:17

## 2019-06-20 RX ADMIN — HYDROMORPHONE HYDROCHLORIDE 4 MILLIGRAM(S): 2 INJECTION INTRAMUSCULAR; INTRAVENOUS; SUBCUTANEOUS at 23:22

## 2019-06-20 RX ADMIN — Medication 25 MILLIGRAM(S): at 12:00

## 2019-06-20 RX ADMIN — Medication 40 MILLIGRAM(S): at 13:23

## 2019-06-20 RX ADMIN — Medication 2 MILLIGRAM(S): at 22:04

## 2019-06-20 RX ADMIN — MORPHINE SULFATE 100 MILLIGRAM(S): 50 CAPSULE, EXTENDED RELEASE ORAL at 12:00

## 2019-06-20 RX ADMIN — Medication 50 MILLIGRAM(S): at 12:00

## 2019-06-20 RX ADMIN — MORPHINE SULFATE 6 MILLIGRAM(S): 50 CAPSULE, EXTENDED RELEASE ORAL at 02:02

## 2019-06-20 RX ADMIN — Medication 650 MILLIGRAM(S): at 17:48

## 2019-06-20 RX ADMIN — MORPHINE SULFATE 60 MILLIGRAM(S): 50 CAPSULE, EXTENDED RELEASE ORAL at 17:48

## 2019-06-20 RX ADMIN — HYDROMORPHONE HYDROCHLORIDE 4 MILLIGRAM(S): 2 INJECTION INTRAMUSCULAR; INTRAVENOUS; SUBCUTANEOUS at 17:01

## 2019-06-20 RX ADMIN — MORPHINE SULFATE 100 MILLIGRAM(S): 50 CAPSULE, EXTENDED RELEASE ORAL at 12:55

## 2019-06-20 RX ADMIN — RIVAROXABAN 20 MILLIGRAM(S): KIT at 17:49

## 2019-06-20 RX ADMIN — Medication 650 MILLIGRAM(S): at 18:25

## 2019-06-20 RX ADMIN — LIDOCAINE 1 PATCH: 4 CREAM TOPICAL at 18:26

## 2019-06-20 RX ADMIN — HYDROMORPHONE HYDROCHLORIDE 4 MILLIGRAM(S): 2 INJECTION INTRAMUSCULAR; INTRAVENOUS; SUBCUTANEOUS at 16:37

## 2019-06-20 RX ADMIN — ZINC SULFATE TAB 220 MG (50 MG ZINC EQUIVALENT) 220 MILLIGRAM(S): 220 (50 ZN) TAB at 12:03

## 2019-06-20 RX ADMIN — LIDOCAINE 1 PATCH: 4 CREAM TOPICAL at 16:38

## 2019-06-20 NOTE — H&P ADULT - NSHPSOCIALHISTORY_GEN_ALL_CORE
Tobacco:   Alcohol:   Drug Use: Tobacco: Stopped smoking 1 year ago. Was smoking 10 cigarettes daily for 30 years. Currently vapes occasionally   Alcohol: No  Drug Use: No

## 2019-06-20 NOTE — H&P ADULT - ATTENDING COMMENTS
Patient seen and examined independently. Resident's H & P reviewed. Agree with the findings and plan of care except,    A 48 years old female with PMH of multiple medical problems presented to the ED with syncopal episode. Pt states that while going to bedroom she suddenly felt her head spinning and then became nauseous and threw up. After vomiting she felt better but passed out for about 5 minutes. No cp. No palpitations. No sob. C/O cough with sputum. No urinary complaints. No fever.    CBC, BMP: WNL  PCO2 on VB  CT head: Unremarkable  CT abd/pelvis: Unremarkable  CTA chest: No PE  EKG: Sinus tach @ 113/min. Poor R wave progression. (Interpreted by me.)    ASSESSMENT:    1. Syncope  2. SLE / RA Patient seen and examined independently. Resident's H & P reviewed. Agree with the findings and plan of care except,    A 48 years old female with PMH of multiple medical problems presented to the ED with syncopal episode. Pt states that while going to bedroom she suddenly felt her head spinning and then became nauseous and threw up. After vomiting she felt better but passed out for about 5 minutes. No cp. No palpitations. No sob. C/O cough with sputum. No urinary complaints. No fever.    CBC, BMP: WNL  PCO2 on VB  CT head: Unremarkable  CT abd/pelvis: Unremarkable  CTA chest: No PE  EKG: Sinus tach @ 113/min. Poor R wave progression. (Interpreted by me.)    ASSESSMENT:    1. Syncope  2. SLE / RA  3. Ch. CHF unspecified (ECHO pending)  4. S/P AICD  5. H/O COPD  6. Ch. Hypercapnic respiratory failure  7. H/O throat Ca.  8. HTN    PLAN:    . Cont tele  . ECHO  . EP for PPM/AICD interrogation  . Cardiology eval  . Cont her home meds.

## 2019-06-20 NOTE — H&P ADULT - NSHPREVIEWOFSYSTEMS_GEN_ALL_CORE
REVIEW OF SYSTEMS:    CONSTITUTIONAL: No weakness, fevers or chills  EYES/ENT: No visual changes;  No vertigo or throat pain   NECK: No pain or stiffness  RESPIRATORY: No cough, wheezing, hemoptysis; No shortness of breath  CARDIOVASCULAR: No chest pain or palpitations  GASTROINTESTINAL: No abdominal or epigastric pain. No nausea, vomiting, or hematemesis; No diarrhea or constipation. No melena or hematochezia.  GENITOURINARY: No dysuria, frequency or hematuria  NEUROLOGICAL: No numbness or weakness  SKIN: No itching, rashes REVIEW OF SYSTEMS:    CONSTITUTIONAL: + weakness, fevers or chills  RESPIRATORY: + cough, no wheezing, no hemoptysis; + shortness of breath  CARDIOVASCULAR: + palpitations, no chest pain   GASTROINTESTINAL: No abdominal or epigastric pain. No nausea, vomiting, or hematemesis; No diarrhea or constipation. No melena or hematochezia.  GENITOURINARY: No dysuria, frequency or hematuria  NEUROLOGICAL: No numbness or weakness  SKIN: No itching, rashes  MSK: + generalized pain

## 2019-06-20 NOTE — H&P ADULT - NSHPPHYSICALEXAM_GEN_ALL_CORE
=================== OBJECTIVE ===================    VITAL SIGNS: Last 24 Hours  T(C): 36.7 (19 Jun 2019 23:28), Max: 36.9 (19 Jun 2019 22:05)  T(F): 98 (19 Jun 2019 23:28), Max: 98.5 (19 Jun 2019 22:05)  HR: 95 (19 Jun 2019 23:28) (95 - 132)  BP: 129/75 (19 Jun 2019 23:28) (129/75 - 167/131)  BP(mean): --  RR: 20 (19 Jun 2019 23:28) (20 - 22)  SpO2: 99% (19 Jun 2019 23:28) (99% - 99%)    PHYSICAL EXAM:  GENERAL: NAD, well-developed  HEAD:  Atraumatic, Normocephalic  EYES: EOMI, PERRLA, conjunctiva and sclera clear  NECK: Supple, No JVD  CHEST/LUNG: Clear to auscultation bilaterally; No wheeze  HEART: Regular rate and rhythm; No murmurs, rubs, or gallops  ABDOMEN: Soft, Nontender, Nondistended; Bowel sounds present  EXTREMITIES:  2+ Peripheral Pulses, No clubbing, cyanosis, or edema  PSYCH: AAOx3  NEUROLOGY: non-focal  General:  Appearance is consistent with chronologic age.  No abnormal facies.   General: AA&Ox3.  Fund of knowledge is intact and normal.  Language with normal repetition, comprehension and naming.  Nondysarthric.    Cranial nerves: intact VA, VFF.  EOMI w/o nystagmus, skew or reported double vision.  PERRL.  No ptosis/weakness of eyelid closure.  Facial sensation is normal with normal bite.  No facial asymmetry.  Hearing grossly intact b/l.  Palate elevates midline.  Tongue midline.  Motor examination:   Normal tone, bulk and range of motion. ++right hand tremor     Formal Muscle Strength Testing: (MRC grade R/L) 5/5 UE; 5/5 LE.  No observable drift. ++ rigidity on circular wrist maneuver and on circular elbow maneuver. - Tinel test    Reflexes:   2+ b/l biceps and brachioradialis.   Sensory examination:   Intact to light touch and pinprick, pain, temperature and proprioception and vibration in all extremities.  Cerebellum:   FTN intact with normal ASH in all limbs.  No dysmetria or dysdiadokinesia.    Gait: ambulates with walker, ++ asymmetric steps, decreased heel strike   SKIN: No rashes or lesions =================== OBJECTIVE ===================    VITAL SIGNS: Last 24 Hours  T(C): 36.7 (19 Jun 2019 23:28), Max: 36.9 (19 Jun 2019 22:05)  T(F): 98 (19 Jun 2019 23:28), Max: 98.5 (19 Jun 2019 22:05)  HR: 95 (19 Jun 2019 23:28) (95 - 132)  BP: 129/75 (19 Jun 2019 23:28) (129/75 - 167/131)  BP(mean): --  RR: 20 (19 Jun 2019 23:28) (20 - 22)  SpO2: 99% (19 Jun 2019 23:28) (99% - 99%)    PHYSICAL EXAM:  GENERAL: NAD, well-developed  CHEST/LUNG: Clear to auscultation bilaterally; No wheeze or crackles   HEART: Regular rate and rhythm; No murmurs  ABDOMEN: Soft, Nontender, Nondistended; Bowel sounds present  EXTREMITIES:  1+ pitting edema of LLE, no pain. No edema of right lower extremity   PSYCH: AAOx3  NEUROLOGY: non-focal  SKIN: No rashes or lesions =================== OBJECTIVE ===================    VITAL SIGNS: Last 24 Hours  T(C): 36.7 (19 Jun 2019 23:28), Max: 36.9 (19 Jun 2019 22:05)  T(F): 98 (19 Jun 2019 23:28), Max: 98.5 (19 Jun 2019 22:05)  HR: 95 (19 Jun 2019 23:28) (95 - 132)  BP: 129/75 (19 Jun 2019 23:28) (129/75 - 167/131)  BP(mean): --  RR: 20 (19 Jun 2019 23:28) (20 - 22)  SpO2: 99% (19 Jun 2019 23:28) (99% - 99%)    PHYSICAL EXAM:  GENERAL: NAD, well-developed  CHEST/LUNG: B/L crackles in the lower chest post  HEART/CVS: Regular rate and rhythm; No murmurs  ABDOMEN/GI: Soft, Nontender, Nondistended; Bowel sounds present  EXTREMITIES:  1+ pitting edema of LLE, no pain. No edema of right lower extremity   PSYCH: AAOx3  NEUROLOGY: non-focal  SKIN: No rashes or lesions

## 2019-06-20 NOTE — CONSULT NOTE ADULT - ASSESSMENT
REVIEW OF SYSTEMS    General:	NAD   Skin/Breast:	Neg  Ophthalmologic:	Neg  ENMT: Neg	  Respiratory and Thorax: COPD   Cardiovascular:	CHF   Gastrointestinal:	Neg  Genitourinary:	Neg  Musculoskeletal:	Neg  Neurological:	Neg  Psychiatric:	Neg  Hematology/Lymphatics:	Lupus, laryngeal cancer  Endocrine:	Neg  DVT (deep venous thrombosis)  COPD (chronic obstructive pulmonary disease)  CHF (congestive heart failure)  HTN (hypertension)  RA (rheumatoid arthritis)  Throat cancer  AICD (automatic cardioverter/defibrillator) present  Lupus  History of laryngeal cancer  S/P cholecystectomy  S/P appendectomy          PHYSICAL EXAM:    GENERAL: NAD, well-groomed, well-developed  HEAD:  Atraumatic, Normocephalic  EYES: EOMI, PERRLA, conjunctiva and sclera clear  ENMT: No tonsillar erythema, exudates, or enlargement; Moist mucous membranes, Good dentition, No lesions  NECK: Supple, No JVD, Normal thyroid  NERVOUS SYSTEM:  Alert & Oriented X3, Good concentration; Motor Strength 5/5 B/L upper and lower extremities  CHEST/LUNG: Clear to percussion bilaterally; No rales, rhonchi, wheezing, or rubs  HEART: Regular rate and rhythm; No murmurs, rubs, or gallops  ABDOMEN: Soft, Nontender, Nondistended; Bowel sounds present  EXTREMITIES: No clubbing, cyanosis, or edema  LYMPH: No lymphadenopathy noted  SKIN: No rashes or lesions      Patient lying in bed supine and easily gets oob. Patient states pain is 10/10 before pain meds and  10/10 after pain meds. Pain is  10/10 now. Pain is described as sharp, constant, non-radiating, better with high dose of meds. worse when in the am. patient states she is constantly in pain and multiple remedies do work for her. We discussed in detail long term pain remedies of opioid and non-opioid. Movement and sensation present to all ext's. lower  to lower mid spine.

## 2019-06-20 NOTE — H&P ADULT - ASSESSMENT
================= ASSESSMENT/PLAN ==================  Patient is a 48y old Female who presents with a chief complaint passing out. Currently admitted to medicine with the primary diagnosis of Syncope    # Syncope   PLAN    # CHF  PLAN    # Lupus/RA  PLAN    # COPD  PLAN    # DVT/PE  PLAN    # CKD stage 2  PLAN  #    GI PPX:   DVT PPX: On Xarelto      DIET:  ACTIVITY:  () Ad Talita  /  () Advance as Tolerated  /  () Bed Rest  /  () Fall Precaution  /  () Seizure precaution    ================= PRESENT TODAY ==================    1-Rodarte Catheter:  Indication:  2-Vascular Access:  []Peripheral | Indication:  []Midline | Indication:   []PICC Line | Indication:  []CVC | Indication:  []Arterial Line | Indication:  []Uldall Catheter | Indication:   3-IV Fluids: | Indication:  4-Ventilation: | Sat:     ================= DISPOSITION ==================    Patient to be discharged when condition(s) optimized.            Discharge to:              Home services:              Counseled on/Discussed cessation of:  () Risky behaviors  /  () Smoking cessation  /  () Diet  /  () Exercise  /  () Other    ================= CODE STATUS =================                  () FULL CODE     |     () DNR     |     () DNI    () Discussion with patient and/or family regarding goals of care ================= ASSESSMENT/PLAN ==================  Patient is a 48y old Female who presents with a chief complaint passing out. Currently admitted to medicine with the primary diagnosis of Syncope    # Syncope   Per the history it seems likely cardiac in origin as was sudden, no presyncope symptoms, was no related to change in position. Could be vasovagal because patient had emesis and nausea 5 minutes before.   CT head negative. Unlikely seizure   Infection unlikely. Patient has positive UA but asymptotic no fever or leukocytosis   PLAN  - Given extensive cardiac history will get 2D echo, cardiology and EP consult. Check AICD for interrogation   - HR on admission was > 100 but no EKG in chart. Monitor looks like NSR. Get STAT EKG   - Check orthostatics as patient might be hypovolemic after emesis. No signs of dehydration on exam.   - Check cardiac enzymes   - Tele monitoring for now    # CHF s/p AICD - Patient currently euvolemic  Last echo in 2018 with EF of 65% and G1DD   - Check 2D echo   - Patient not on Lasix, ace or arb  - Continue hydralazine and toprol. Optimize medications with cardiology (patient's paper says hydralazine 50 once daily and toprol 50 twice daily)     # COPD - Increased sputum and shortness of breath   No wheezing on exam   PLAN  - Will start PO prednisone taper   - Glendybs   - Pulm follow up (Dr. Yamil Martinez)     # Lupus/RA - Uncontrolled   Patient still with pain and worsening   On Benlystix injection for RA   UA with some protein   - Will check CRP, C3, C4 and dsdna   - Consider rheumatology (patient requesting Dr. Carroll to come see her)   - Continue with pain regimen.   - Patient's pain regimen was recently decreased by her pain management physician however the pain is uncontrolled and she's more bed bound now per her (although on exam she seemed pain free while moving). She requests pain management consult      # DVT/PE - Per history the patient gave it seems unprovoked. Could be secondary to lupus anticoagulant   - Continue with the Xarelto for now     # CKD stage 2 - Stable     GI PPX: Pantoprazole   DVT PPX: On Xarelto      DIET: DASH   ACTIVITY:  () Ad Talita  /  (X) Advance as Tolerated  /  () Bed Rest  /  () Fall Precaution  /  () Seizure precaution    ================= PRESENT TODAY ==================    1-Rodarte Catheter: No  Indication:  2-Vascular Access:  [X]Peripheral | Indication:  3-IV Fluids: Off | Indication:  4-Ventilation: Room air | Sat:     ================= DISPOSITION ==================    Patient to be discharged when condition(s) optimized.            Discharge to: Home when cleared              Home services:              Counseled on/Discussed cessation of:  () Risky behaviors  /  () Smoking cessation  /  () Diet  /  () Exercise  /  () Other    ================= CODE STATUS =================                  (X) FULL CODE     |     () DNR     |     () DNI    () Discussion with patient and/or family regarding goals of care ================= ASSESSMENT/PLAN ==================  Patient is a 48y old Female who presents with a chief complaint passing out. Currently admitted to medicine with the primary diagnosis of Syncope    # Syncope   Per the history it seems likely cardiac in origin as was sudden, no presyncope symptoms, was no related to change in position. Could be vasovagal because patient had emesis and nausea 5 minutes before.   CT head negative. Unlikely seizure   Infection unlikely. Patient has positive UA but asymptotic no fever or leukocytosis   PLAN  - Given extensive cardiac history will get 2D echo, cardiology and EP consult. Check AICD for interrogation   - HR on admission was > 100 but no EKG in chart. Monitor looks like NSR. Get STAT EKG   - Check orthostatics as patient might be hypovolemic after emesis. No signs of dehydration on exam.   - Check cardiac enzymes   - Tele monitoring for now  - Monitor electrolytes and keep K > 4 and Mg > 2    # CHF s/p AICD - Patient currently euvolemic  Last echo in 2018 with EF of 65% and G1DD   - Check 2D echo   - Patient not on Lasix, ace or arb  - Continue hydralazine and toprol. Optimize medications with cardiology (patient's paper says hydralazine 50 once daily and toprol 50 twice daily)     # COPD - Increased sputum and shortness of breath   No wheezing on exam   PLAN  - Will start PO prednisone taper   - Duonebs   - Pulm follow up (Dr. Yamil Martinez)     # Lupus/RA - Uncontrolled   Patient still with pain and worsening   On Benlystix injection for RA   UA with some protein   - Will check CRP, C3, C4 and dsdna   - Consider rheumatology (patient requesting Dr. Carroll to come see her)   - Continue with pain regimen.   - Patient's pain regimen was recently decreased by her pain management physician however the pain is uncontrolled and she's more bed bound now per her (although on exam she seemed pain free while moving). She requests pain management consult      # Asymptomatic bacteriuria   - Off antibiotics for now   - Sent urine culture     # DVT/PE - Per history the patient gave it seems unprovoked. Could be secondary to lupus anticoagulant   - Continue with the Xarelto for now     # CKD stage 2 - Stable     GI PPX: Pantoprazole   DVT PPX: On Xarelto      DIET: DASH   ACTIVITY:  () Ad Talita  /  (X) Advance as Tolerated  /  () Bed Rest  /  () Fall Precaution  /  () Seizure precaution    ================= PRESENT TODAY ==================    1-Rodarte Catheter: No  Indication:  2-Vascular Access:  [X]Peripheral | Indication:  3-IV Fluids: Off | Indication:  4-Ventilation: Room air | Sat:     ================= DISPOSITION ==================    Patient to be discharged when condition(s) optimized.            Discharge to: Home when cleared              Home services:              Counseled on/Discussed cessation of:  () Risky behaviors  /  () Smoking cessation  /  () Diet  /  () Exercise  /  () Other    ================= CODE STATUS =================                  (X) FULL CODE     |     () DNR     |     () DNI    () Discussion with patient and/or family regarding goals of care ================= ASSESSMENT/PLAN ==================  Patient is a 48y old Female who presents with a chief complaint passing out. Currently admitted to medicine with the primary diagnosis of Syncope    # Syncope   Per the history it seems likely cardiac in origin as was sudden, no presyncope symptoms, was no related to change in position. Could be vasovagal because patient had emesis and nausea 5 minutes before.   CT head negative. Unlikely seizure   Infection unlikely. Patient has positive UA but asymptotic no fever or leukocytosis   PLAN  - Given extensive cardiac history will get 2D echo, cardiology and EP consult. Check AICD for interrogation   - HR on admission was > 100 but no EKG in chart. Monitor looks like NSR. Get STAT EKG   - Check orthostatics as patient might be hypovolemic after emesis. No signs of dehydration on exam.   - Check cardiac enzymes   - Tele monitoring for now  - Monitor electrolytes and keep K > 4 and Mg > 2    # CHF s/p AICD - Patient currently euvolemic  Last echo in 2018 with EF of 65% and G1DD   - Check 2D echo   - Patient not on Lasix, ace or arb  - Continue hydralazine and toprol. Optimize medications with cardiology (patient's paper says hydralazine 50 once daily and toprol 50 twice daily)     # COPD - Increased sputum and shortness of breath   No wheezing on exam   PLAN  - Will start PO prednisone taper   - Glendybs   - Pulm follow up (Dr. Yamil Martinez)     # Lupus/RA - Uncontrolled   Patient still with pain and worsening   On Benlystix injection for RA   UA with some protein   - Will check CRP, C3, C4 and dsdna   - Consider rheumatology (patient requesting Dr. Carroll to come see her)   - Continue with pain regimen.   - Patient's pain regimen was recently decreased by her pain management physician however the pain is uncontrolled and she's more bed bound now per her (although on exam she seemed pain free while moving). She requests pain management consult    - Patient's medications should be checked with pharmacy as she is on an unconventional dose of MS contin (60 x3 in the morning, x2 in the afternoon and x2 in the evening)     # Asymptomatic bacteriuria   - Off antibiotics for now   - Sent urine culture     # DVT/PE - Per history the patient gave it seems unprovoked. Could be secondary to lupus anticoagulant   - Continue with the Xarelto for now     # CKD stage 2 - Stable     GI PPX: Pantoprazole   DVT PPX: On Xarelto      DIET: DASH   ACTIVITY:  () Ad Talita  /  (X) Advance as Tolerated  /  () Bed Rest  /  () Fall Precaution  /  () Seizure precaution    ================= PRESENT TODAY ==================    1-Rodarte Catheter: No  Indication:  2-Vascular Access:  [X]Peripheral | Indication:  3-IV Fluids: Off | Indication:  4-Ventilation: Room air | Sat:     ================= DISPOSITION ==================    Patient to be discharged when condition(s) optimized.            Discharge to: Home when cleared              Home services:              Counseled on/Discussed cessation of:  () Risky behaviors  /  () Smoking cessation  /  () Diet  /  () Exercise  /  () Other    ================= CODE STATUS =================                  (X) FULL CODE     |     () DNR     |     () DNI    () Discussion with patient and/or family regarding goals of care

## 2019-06-20 NOTE — H&P ADULT - HISTORY OF PRESENT ILLNESS
Chief Complaint: Syncope     49 y/o F with significant PMH of Lupus, CHF s/p AICD+PPM, DVT on xarelto, COPD, RA, laryngeal cancer s/p excision in november, esophageal thrush with laryngeal candidiasis, sepsis secondary to LUE cellulitis , Klebsiella R middle lobe pneumonia presents for syncope     History goes back to     In the ED, her initial vitals showed a BP of 167/131, HR of 132, Temp of 36.9, SpO2 of 99% on room air. She was given 1L bolus of LR and morphine IV. Lab work showed a creatinine of 1.1, negative troponin X1, BNP of 333. CTPA was negative for PE but showed several pulmonary nodules, CT abdomen was negative for acute pathology, CT head was negative. Chief Complaint: Syncope     47 y/o F with significant PMH of lupus with lupus anticoagulant and rheumatoid arthritis for more than 10 years with different treatments tried with no improvement, CHF s/p AICD+PPM (per the patient she was diagnosed with CHF in 2011 after a long hospital stay although cause is unkown which required temporary pacemaker which was then upgraded to an AICD due to recurrent v tach), DVT/PE on xarelto in 2015 it seems unprovoked diagnosed after sudden onset LLE pain, NSTEMI also in 2015, COPD not home O2 and never intubated, laryngeal cancer s/p excision in november, esophageal thrush with laryngeal candidiasis, sepsis secondary to LUE cellulitis , Klebsiella R middle lobe pneumonia presents for syncope     History goes back to the day of presentation when patient felt lightheaded suddenly, followed by nausea and emesis. After emesis she felt better and as she walked back to bed she passed out (she says about 5 minutes after the episode). The last thing she remembers after that is her  trying to wake her up. The syncope happenned suddenly with no presyncope symptoms, however she had palpitations just before. She was passed out for 1 minute according to the  and regained mental status in 10 seconds after. There was no body shaking, no urine loss and no tongue biting. Patient has a history of syncope however per the patient these were always preceded by presyncope, unlike this time.   The patient endorses that since January she has had episodes of palpitations which then when she would measure her BP would be associated with high blood pressure (as high as 214/160) and a HR as high as 160. Per the patient her AICD was last interrogated in 2/2019 which showed no events.     In the ED, her initial vitals showed a BP of 167/131, HR of 132, Temp of 36.9, SpO2 of 99% on room air. She was given 1L bolus of LR and morphine IV. Lab work showed a creatinine of 1.1, negative troponin X1, BNP of 333. CTPA was negative for PE but showed several pulmonary nodules, CT abdomen was negative for acute pathology, CT head was negative.

## 2019-06-20 NOTE — ED PROVIDER NOTE - PHYSICAL EXAMINATION
CONST: Well appearing in NAD  HEAD: NC/AT  EYES: PERRL, EOMI, Sclera and conjunctiva clear.  NECK: No midline C/T/L tenderness  CARD: Normal S1 S2; Normal rate and rhythm  RESP: Equal BS B/L, No wheezes, rhonchi or rales. No distress  GI: Soft, non-tender, non-distended.  MS: Normal ROM in all extremities. No edema of lower extremities, no calf pain, radial pulses 2+ bilaterally, no TTP of extremities.   SKIN: Warm, dry, no acute rashes. Good turgor  NEURO: A&Ox3, CN II-XII intact, no focal deficits, no facial asymmetry, no pronator drift, normal finger to nose, no nystagmus, gross sensation intact, UE/LE strength 5/5, stable gait

## 2019-06-20 NOTE — CONSULT NOTE ADULT - ASSESSMENT
syncope, for a minute, post event 3-4 minutes confusion, states had fever, lack of apetite and did not eat or drink well for couple days, most lijkely orhtostatic intolerance, doubt about arrhythmia, unremarkable ECG and monitoring  htn: stable  Arrhythmia: stable  chronic pain, states having lots of trouble in moving, functioning  no active cardiac problem was found    AICD interrogation, if no arrhythmia d/c telemetry, then transfer to regular floor for the further management of her extensive list of problems and complaint  no change in metoprolol, hydralazine, Xarelto

## 2019-06-20 NOTE — ED PROVIDER NOTE - ATTENDING CONTRIBUTION TO CARE
I personally evaluated the patient. I reviewed the Resident’s or Physician Assistant’s note (as assigned above), and agree with the findings and plan except as documented in my note.    47 y/o F with hx of SLE, PE/DVT on xeralto presents w syncope. No CP, SOB. No nausea, vomiting. No abd pain.     CONSTITUTIONAL: Well-developed; well-nourished; in no acute distress. Sitting up and providing appropriate history and physical examination  SKIN: skin exam is warm and dry, no acute rash.  HEAD: Normocephalic; atraumatic.  EYES: PERRL, 3 mm bilateral, no nystagmus, EOM intact; conjunctiva and sclera clear.  ENT: No nasal discharge; airway clear.  NECK: Supple; non tender.+ full passive ROM in all directions. No JVD  CARD: S1, S2 normal; no murmurs, gallops, or rubs. Regular rate and rhythm. + Symmetric Strong Pulses  RESP: No wheezes, rales or rhonchi. Good air movement bilaterally  ABD: soft; non-distended; non-tender. No Rebound, No Gaurding, No signs of peritnitis, No CVA tenderness  EXT: Normal ROM. No clubbing, cyanosis or edema. Dp and Pt Pulses intact. Cap refill less than 3 seconds

## 2019-06-20 NOTE — CONSULT NOTE ADULT - SUBJECTIVE AND OBJECTIVE BOX
Patient is a 48y old  Female who presents with a chief complaint of Syncope (2019 12:25)      HPI:  Chief Complaint: Syncope     47 y/o F with significant PMH of lupus with lupus anticoagulant and rheumatoid arthritis for more than 10 years with different treatments tried with no improvement, CHF s/p AICD+PPM (per the patient she was diagnosed with CHF in  after a long hospital stay although cause is unkown which required temporary pacemaker which was then upgraded to an AICD due to recurrent v tach), DVT/PE on xarelto in  it seems unprovoked diagnosed after sudden onset LLE pain, NSTEMI also in , COPD not home O2 and never intubated, laryngeal cancer s/p excision in november, esophageal thrush with laryngeal candidiasis, sepsis secondary to LUE cellulitis , Klebsiella R middle lobe pneumonia presents for syncope     History goes back to the day of presentation when patient felt lightheaded suddenly, followed by nausea and emesis. After emesis she felt better and as she walked back to bed she passed out (she says about 5 minutes after the episode). The last thing she remembers after that is her  trying to wake her up. The syncope happenned suddenly with no presyncope symptoms, however she had palpitations just before. She was passed out for 1 minute according to the  and regained mental status in 10 seconds after. There was no body shaking, no urine loss and no tongue biting. Patient has a history of syncope however per the patient these were always preceded by presyncope, unlike this time.   The patient endorses that since January she has had episodes of palpitations which then when she would measure her BP would be associated with high blood pressure (as high as 214/160) and a HR as high as 160. Per the patient her AICD was last interrogated in 2019 which showed no events.     In the ED, her initial vitals showed a BP of 167/131, HR of 132, Temp of 36.9, SpO2 of 99% on room air. She was given 1L bolus of LR and morphine IV. Lab work showed a creatinine of 1.1, negative troponin X1, BNP of 333. CTPA was negative for PE but showed several pulmonary nodules, CT abdomen was negative for acute pathology, CT head was negative. (2019 04:54)      PAST MEDICAL & SURGICAL HISTORY:  DVT (deep venous thrombosis)  COPD (chronic obstructive pulmonary disease)  CHF (congestive heart failure)  HTN (hypertension)  RA (rheumatoid arthritis)  Throat cancer  AICD (automatic cardioverter/defibrillator) present  Lupus  History of laryngeal cancer  S/P cholecystectomy  S/P appendectomy      SOCIAL HX:   Quit smoking    FAMILY HISTORY:  .  No cardiovascular or pulmonary family history     Review of System:  See HPI    Allergies    Avelox (Other)  Plaquenil (Other)  Vantin (Other (Mild))    PHYSICAL EXAM  Vital Signs Last 24 Hrs  T(C): 36.1 (2019 13:41), Max: 36.9 (2019 22:05)  T(F): 97 (2019 13:41), Max: 98.5 (2019 22:05)  HR: 73 (2019 13:41) (73 - 132)  BP: 144/83 (2019 13:41) (129/75 - 167/131)  RR: 20 (2019 13:41) (18 - 22)  SpO2: 94% on RA (2019 06:28) (93% - 99%)    General: In NAD  HEENT: JESSIE             Lymphatic system: No cervical LN     Lungs: Olvin BS heard with bibasilar wheezing  Cardiovascular: Regular  Gastrointestinal: Soft.  + BS   Musculoskeletal: No Clubbing.  Full range of motion.. Moves all extremities  Skin: Warm.  Intact  Neurological: No motor or sensory deficit       LABS:                          13.5   6.81  )-----------( 165      ( 2019 22:41 )             41.2                                               06-19    137  |  99  |  11  ----------------------------<  105<H>  4.1   |  27  |  1.1    Ca    9.3      2019 22:41  Mg     2.0         TPro  7.1  /  Alb  4.2  /  TBili  0.3  /  DBili  x   /  AST  15  /  ALT  9   /  AlkPhos  83        PT/INR - ( 2019 22:41 )   PT: 11.70 sec;   INR: 1.02 ratio         PTT - ( 2019 22:41 )  PTT:54.6 sec                                       Urinalysis Basic - ( 2019 23:20 )    Color: Dark Yellow / Appearance: Cloudy / S.025 / pH: x  Gluc: x / Ketone: Trace  / Bili: Small / Urobili: 1.0 mg/dL   Blood: x / Protein: 30 mg/dL / Nitrite: Negative   Leuk Esterase: Moderate / RBC: 26-50 /HPF / WBC 26-50 /HPF   Sq Epi: x / Non Sq Epi: Many /HPF / Bacteria: Moderate /HPF        CARDIAC MARKERS ( 2019 11:42 )  x     / <0.01 ng/mL / x     / x     / x      CARDIAC MARKERS ( 2019 22:41 )  x     / <0.01 ng/mL / x     / x     / x                                                LIVER FUNCTIONS - ( 2019 22:41 )  Alb: 4.2 g/dL / Pro: 7.1 g/dL / ALK PHOS: 83 U/L / ALT: 9 U/L / AST: 15 U/L / GGT: x                                                               < from: CT Angio Chest w/ IV Cont (19 @ 00:47) >  IMPRESSION:     No CT evidence of acute pulmonary embolus.    No CT evidence of intra-abdominal pathology.    Multiple pulmonary nodules as described above either stable or resolved,   indicating infectious etiology.    < end of copied text >                                   MEDICATIONS  (STANDING):  folic acid 1 milliGRAM(s) Oral daily  hydrALAZINE 50 milliGRAM(s) Oral daily  metoprolol succinate ER 25 milliGRAM(s) Oral daily  predniSONE   Tablet 40 milliGRAM(s) Oral daily  rivaroxaban 20 milliGRAM(s) Oral with dinner  zinc sulfate 220 milliGRAM(s) Oral daily    MEDICATIONS  (PRN):  ALBUTerol/ipratropium for Nebulization 3 milliLiter(s) Nebulizer every 6 hours PRN Shortness of Breath and/or Wheezing  ALPRAZolam 2 milliGRAM(s) Oral every 8 hours PRN anxiety  HYDROmorphone   Tablet 4 milliGRAM(s) Oral three times a day PRN Severe Pain (7 - 10) Patient is a 48y old  Female who presents with a chief complaint of Syncope (2019 12:25)      HPI:  Chief Complaint: Syncope     49 y/o F with significant PMH of lupus with lupus anticoagulant and rheumatoid arthritis for more than 10 years with different treatments tried with no improvement, CHF s/p AICD+PPM (per the patient she was diagnosed with CHF in  after a long hospital stay although cause is unkown which required temporary pacemaker which was then upgraded to an AICD due to recurrent v tach), DVT/PE on xarelto in  it seems unprovoked diagnosed after sudden onset LLE pain, NSTEMI also in , COPD not home O2 and never intubated, laryngeal cancer s/p excision in november, esophageal thrush with laryngeal candidiasis, sepsis secondary to LUE cellulitis , Klebsiella R middle lobe pneumonia presents for syncope     History goes back to the day of presentation when patient felt lightheaded suddenly, followed by nausea and emesis. After emesis she felt better and as she walked back to bed she passed out (she says about 5 minutes after the episode). The last thing she remembers after that is her  trying to wake her up. The syncope happenned suddenly with no presyncope symptoms, however she had palpitations just before. She was passed out for 1 minute according to the  and regained mental status in 10 seconds after. There was no body shaking, no urine loss and no tongue biting. Patient has a history of syncope however per the patient these were always preceded by presyncope, unlike this time.   The patient endorses that since January she has had episodes of palpitations which then when she would measure her BP would be associated with high blood pressure (as high as 214/160) and a HR as high as 160. Per the patient her AICD was last interrogated in 2019 which showed no events.     In the ED, her initial vitals showed a BP of 167/131, HR of 132, Temp of 36.9, SpO2 of 99% on room air. She was given 1L bolus of LR and morphine IV. Lab work showed a creatinine of 1.1, negative troponin X1, BNP of 333. CTPA was negative for PE but showed several pulmonary nodules, CT abdomen was negative for acute pathology, CT head was negative. (2019 04:54), co cough, wheezing      PAST MEDICAL & SURGICAL HISTORY:  DVT (deep venous thrombosis)  COPD (chronic obstructive pulmonary disease)  CHF (congestive heart failure)  HTN (hypertension)  RA (rheumatoid arthritis)  Throat cancer  AICD (automatic cardioverter/defibrillator) present  Lupus  History of laryngeal cancer  S/P cholecystectomy  S/P appendectomy      SOCIAL HX:   Quit smoking    FAMILY HISTORY:  .  No cardiovascular or pulmonary family history     Review of System:  See HPI    Allergies    Avelox (Other)  Plaquenil (Other)  Vantin (Other (Mild))    PHYSICAL EXAM  Vital Signs Last 24 Hrs  T(C): 36.1 (2019 13:41), Max: 36.9 (2019 22:05)  T(F): 97 (2019 13:41), Max: 98.5 (2019 22:05)  HR: 73 (2019 13:41) (73 - 132)  BP: 144/83 (2019 13:41) (129/75 - 167/131)  RR: 20 (2019 13:41) (18 - 22)  SpO2: 94% on RA (2019 06:28) (93% - 99%)    General: In NAD  HEENT: JESSIE             Lymphatic system: No cervical LN     Lungs: Olvin BS heard with bibasilar wheezing  Cardiovascular: Regular  Gastrointestinal: Soft.  + BS   Musculoskeletal: No Clubbing.  Full range of motion.. Moves all extremities  Skin: Warm.  Intact  Neurological: No motor or sensory deficit       LABS:                          13.5   6.81  )-----------( 165      ( 2019 22:41 )             41.2                                               -    137  |  99  |  11  ----------------------------<  105<H>  4.1   |  27  |  1.1    Ca    9.3      2019 22:41  Mg     2.0         TPro  7.1  /  Alb  4.2  /  TBili  0.3  /  DBili  x   /  AST  15  /  ALT  9   /  AlkPhos  83        PT/INR - ( 2019 22:41 )   PT: 11.70 sec;   INR: 1.02 ratio         PTT - ( 2019 22:41 )  PTT:54.6 sec                                       Urinalysis Basic - ( 2019 23:20 )    Color: Dark Yellow / Appearance: Cloudy / S.025 / pH: x  Gluc: x / Ketone: Trace  / Bili: Small / Urobili: 1.0 mg/dL   Blood: x / Protein: 30 mg/dL / Nitrite: Negative   Leuk Esterase: Moderate / RBC: 26-50 /HPF / WBC 26-50 /HPF   Sq Epi: x / Non Sq Epi: Many /HPF / Bacteria: Moderate /HPF        CARDIAC MARKERS ( 2019 11:42 )  x     / <0.01 ng/mL / x     / x     / x      CARDIAC MARKERS ( 2019 22:41 )  x     / <0.01 ng/mL / x     / x     / x                                                LIVER FUNCTIONS - ( 2019 22:41 )  Alb: 4.2 g/dL / Pro: 7.1 g/dL / ALK PHOS: 83 U/L / ALT: 9 U/L / AST: 15 U/L / GGT: x                                                               < from: CT Angio Chest w/ IV Cont (19 @ 00:47) >  IMPRESSION:     No CT evidence of acute pulmonary embolus.    No CT evidence of intra-abdominal pathology.    Multiple pulmonary nodules as described above either stable or resolved,   indicating infectious etiology.    < end of copied text >                                   MEDICATIONS  (STANDING):  folic acid 1 milliGRAM(s) Oral daily  hydrALAZINE 50 milliGRAM(s) Oral daily  metoprolol succinate ER 25 milliGRAM(s) Oral daily  predniSONE   Tablet 40 milliGRAM(s) Oral daily  rivaroxaban 20 milliGRAM(s) Oral with dinner  zinc sulfate 220 milliGRAM(s) Oral daily    MEDICATIONS  (PRN):  ALBUTerol/ipratropium for Nebulization 3 milliLiter(s) Nebulizer every 6 hours PRN Shortness of Breath and/or Wheezing  ALPRAZolam 2 milliGRAM(s) Oral every 8 hours PRN anxiety  HYDROmorphone   Tablet 4 milliGRAM(s) Oral three times a day PRN Severe Pain (7 - 10)

## 2019-06-20 NOTE — CHART NOTE - NSCHARTNOTEFT_GEN_A_CORE
Spoke with patient and pain management ASUNCION Reese who verified Home morphine dosing.  Patient Takes Morphine 180mg In the AM, 120 mg In the afternoon and 120mg in the evening.  Prescribed at [facility] pharmacy. Dosing also verified bottle at bedside.

## 2019-06-20 NOTE — ED PROVIDER NOTE - PROGRESS NOTE DETAILS
Discussed case with MAR.  aware of admission. discussed all results w/ pt including incidental findings.  copy of results given to PT.

## 2019-06-20 NOTE — ED PROVIDER NOTE - NS ED ROS FT
Constitutional: See HPI.  Eyes: No visual changes, eye pain or discharge.   ENMT: No hearing changes, pain, discharge or infections.   Cardiac: + syncope. No SOB or edema. No chest pain with exertion.  Respiratory: No cough or respiratory distress.  GI: No nausea, vomiting, diarrhea or abdominal pain.  : No dysuria, frequency or burning. No Discharge  MS: No myalgia, muscle weakness, joint pain or back pain.  Neuro: No headache or weakness.   Skin: No skin rash.  Except as documented in the HPI, all other systems are negative.

## 2019-06-20 NOTE — CONSULT NOTE ADULT - SUBJECTIVE AND OBJECTIVE BOX
Chief Complaint:     HPI:  This is a 48y Female HPI:  Chief Complaint: Syncope     47 y/o F with significant PMH of lupus with lupus and rheumatoid arthritis for more than 10 years,  CHF s/p AICD+PPM,  CHF in 2011  Patient with history of laryngeal cancer. Patient is c/o lower back pain at this time. Patient states she has a pain mgmt doctor who has recently down her opioids which she has been on for the past 20yrs. Patient came to hospital for syncope related to the pain as per the patient.        Allergies    Avelox (Other)  Plaquenil (Other)  Vantin (Other (Mild))    Intolerances        PAST MEDICAL & SURGICAL HISTORY:  DVT (deep venous thrombosis)  COPD (chronic obstructive pulmonary disease)  CHF (congestive heart failure)  HTN (hypertension)  RA (rheumatoid arthritis)  Throat cancer  AICD (automatic cardioverter/defibrillator) present  Lupus  History of laryngeal cancer  S/P cholecystectomy  S/P appendectomy        SOCIAL HISTORY:  Denies Smoking, Alcohol, or Drug Use    Avelox (Other)  Plaquenil (Other)  Vantin (Other (Mild))      PAIN MEDICATIONS:  ALPRAZolam 2 milliGRAM(s) Oral every 8 hours PRN  HYDROmorphone   Tablet 4 milliGRAM(s) Oral three times a day PRN    Heme:  rivaroxaban 20 milliGRAM(s) Oral with dinner    Antibiotics:    Cardiovascular:  hydrALAZINE 50 milliGRAM(s) Oral daily  metoprolol succinate ER 25 milliGRAM(s) Oral daily    GI:    Endocrine:  predniSONE   Tablet 40 milliGRAM(s) Oral daily    All Other Medications:  folic acid 1 milliGRAM(s) Oral daily  zinc sulfate 220 milliGRAM(s) Oral daily      Vital Signs Last 24 Hrs  T(C): 36.1 (2019 13:41), Max: 36.9 (2019 22:05)  T(F): 97 (2019 13:41), Max: 98.5 (2019 22:05)  HR: 73 (2019 13:41) (73 - 132)  BP: 144/83 (2019 13:41) (129/75 - 167/131)  BP(mean): --  RR: 20 (2019 13:41) (18 - 22)  SpO2: 93% (2019 06:28) (93% - 99%)        LABS:                          13.5   6.81  )-----------( 165      ( 2019 22:41 )             41.2         137  |  99  |  11  ----------------------------<  105<H>  4.1   |  27  |  1.1    Ca    9.3      2019 22:41  Mg     2.0         TPro  7.1  /  Alb  4.2  /  TBili  0.3  /  DBili  x   /  AST  15  /  ALT  9   /  AlkPhos  83      PT/INR - ( 2019 22:41 )   PT: 11.70 sec;   INR: 1.02 ratio         PTT - ( 2019 22:41 )  PTT:54.6 sec  Urinalysis Basic - ( 2019 23:20 )    Color: Dark Yellow / Appearance: Cloudy / S.025 / pH: x  Gluc: x / Ketone: Trace  / Bili: Small / Urobili: 1.0 mg/dL   Blood: x / Protein: 30 mg/dL / Nitrite: Negative   Leuk Esterase: Moderate / RBC: 26-50 /HPF / WBC 26-50 /HPF   Sq Epi: x / Non Sq Epi: Many /HPF / Bacteria: Moderate /HPF        RADIOLOGY:    Drug Screen:        [ ]  [facility]  Reviewed on 19, 2:14P  Patient Name:	Gwendolyn Bauman	YOB: 1970  Address:	[address] [place] [address]	Sex:	Female  Rx Written	Rx Dispensed	Drug	Quantity	Days Supply	Prescriber Name  2019	alprazolam 1 mg tablet	180	30	Jo Bush MD  2019	hydromorphone 4 mg tablet	90	30	ModaffBriana krishnamurthy  2019	morphine sulf er 60 mg tablet	210	30	Briana Moctezuma  2019	alprazolam 1 mg tablet	180	30	Jo Bush MD  2019	hydromorphone 4 mg tablet	90	30	ModaffBriana krishnamurthy  2019	morphine sulf er 60 mg tablet	240	30	Briana Moctezuma  2019	alprazolam 1 mg tablet	180	30	Jo Bush MD  2019	hydromorphone 4 mg tablet	90	30	Danii Burnette N  2019	morphine sulf er 60 mg tablet	270	30	Danii Burnette N  2019	alprazolam 1 mg tablet	180	30	Jo Bush MD  2019	morphine sulf er 100 mg tablet	180	30	Argentina Josey  2019	hydromorphone 4 mg tablet	90	30	Damon Su  2019	02/15/2019	alprazolam 1 mg tablet	180	30	Jo Bush MD  2019	morphine sulf er 100 mg tablet	180	30	Pawan Burnetteaine N  2019	hydromorphone 4 mg tablet	198	28	Pawan Burnetteaine N  01/15/2019	2019	alprazolam 1 mg tablet	180	30	Jo Bush MD  2018	morphine sulf er 100 mg tablet	180	30	Tejal Paez (MS)  2018	hydromorphone 4 mg tablet	360	30	Tejal Paez (MS)  2018	alprazolam 1 mg tablet	180	30	Jo Bush MD  2018	zolpidem tartrate 10 mg tablet	15	15	Musa Magana MD  2018	morphine sulf er 100 mg tablet	180	30	Tejal Paez (MS)  2018	hydromorphone 4 mg tablet	360	30	Tejal Paez (MS)  2018	alprazolam 1 mg tablet	180	30	Jo Bush MD  10/29/2018	2018	morphine sulf er 100 mg tablet	180	30	Delisa Cook  10/29/2018	2018	hydromorphone 4 mg tablet	360	30	Delisa Cook  10/22/2018	10/23/2018	alprazolam 1 mg tablet	180	30	Jo Bush MD  10/03/2018	10/04/2018	hydromorphone 4 mg tablet	360	30	Delisa Cook  2018	alprazolam 1 mg tablet	180	30	Jo Bush MD  2018	alprazolam 1 mg tablet	180	30	Jo Bush MD  2018	morphine sulf er 100 mg tablet	180	30	Nick Alfaro  2018	hydromorphone 4 mg tablet	360	30	Delisa Cook  2018	alprazolam 1 mg tablet	180	30	Jo Bush MD  2018	hydromorphone 4 mg tablet	360	30	Delisa Cook  2018	morphine sulf er 100 mg tablet	180	30	Delisa Cook  2018	hydromorphone 4 mg tablet	168	14	Corey Galindo  2018	alprazolam 1 mg tablet	180	30	Jo Bush MD  2018	hydromorphone 4 mg tablet	120	10	Delisa Cook  Patient Name:	Gwendolyn Bauman	YOB: 1970  Address:	[address] [place] [address]	Sex:	Female  Rx Written	Rx Dispensed	Drug	Quantity	Days Supply	Prescriber Name  10/03/2018	10/04/2018	morphine sulf er 100 mg tablet	180	30	Delisa Cook  2018	hydromorphone 4 mg tablet	240	20	Delisa Cook  2018	morphine sulf er 100 mg tablet	180	30	Delisa Cook  2018	hydromorphone 4 mg tablet	120	10	Delisa Cook

## 2019-06-20 NOTE — CONSULT NOTE ADULT - CONSULT REASON
Pt presented with syncope. On large amount of opioids at home. Need help readjusting meds
syncope, palpitation
tachycardia, syncope
COPD

## 2019-06-20 NOTE — H&P ADULT - NSHPLABSRESULTS_GEN_ALL_CORE
===================== LABS =====================                        13.5   6.81  )-----------( 165      ( 2019 22:41 )             41.2         137  |  99  |  11  ----------------------------<  105<H>  4.1   |  27  |  1.1    Ca    9.3      2019 22:41  Mg     2.0         TPro  7.1  /  Alb  4.2  /  TBili  0.3  /  DBili  x   /  AST  15  /  ALT  9   /  AlkPhos  83      PT/INR - ( 2019 22:41 )   PT: 11.70 sec;   INR: 1.02 ratio         PTT - ( 2019 22:41 )  PTT:54.6 sec  Urinalysis Basic - ( 2019 23:20 )    Color: Dark Yellow / Appearance: Cloudy / S.025 / pH: x  Gluc: x / Ketone: Trace  / Bili: Small / Urobili: 1.0 mg/dL   Blood: x / Protein: 30 mg/dL / Nitrite: Negative   Leuk Esterase: Moderate / RBC: 26-50 /HPF / WBC 26-50 /HPF   Sq Epi: x / Non Sq Epi: Many /HPF / Bacteria: Moderate /HPF        Troponin T, Serum: <0.01 ng/mL (19 @ 22:41)    CARDIAC MARKERS ( 2019 22:41 )  x     / <0.01 ng/mL / x     / x     / x          ================= MICROBIOLOGY ================    ================== IMAGING ==================    ================== OTHER SIGNIFICANT FINDINGS ==================    ================== PROCEDURES ==================    ================== EKG ================== ===================== LABS =====================                        13.5   6.81  )-----------( 165      ( 2019 22:41 )             41.2         137  |  99  |  11  ----------------------------<  105<H>  4.1   |  27  |  1.1    Ca    9.3      2019 22:41  Mg     2.0       TPro  7.1  /  Alb  4.2  /  TBili  0.3  /  DBili  x   /  AST  15  /  ALT  9   /  AlkPhos  83    PT/INR - ( 2019 22:41 )   PT: 11.70 sec;   INR: 1.02 ratio    PTT - ( 2019 22:41 )  PTT:54.6 sec  Urinalysis Basic - ( 2019 23:20   Color: Dark Yellow / Appearance: Cloudy / S.025 / pH: x  Gluc: x / Ketone: Trace  / Bili: Small / Urobili: 1.0 mg/dL   Blood: x / Protein: 30 mg/dL / Nitrite: Negative   Leuk Esterase: Moderate / RBC: 26-50 /HPF / WBC 26-50 /HPF   Sq Epi: x / Non Sq Epi: Many /HPF / Bacteria: Moderate /HPF  Troponin T, Serum: <0.01 ng/mL (19 @ 22:41)  CARDIAC MARKERS ( 2019 22:41 )  x     / <0.01 ng/mL / x     / x     / x        ================== IMAGING ==================    IMPRESSION:     No CT evidence of acute pulmonary embolus.    No CT evidence of intra-abdominal pathology.    Multiple pulmonary nodules as described above either stable or resolved,   indicating infectious etiology.    < end of copied text >    < from: CT Head No Cont (19 @ 00:36) >    IMPRESSION:     No evidence of intracranial hemorrhage, territorial infarct, or mass   effect.    < end of copied text >

## 2019-06-20 NOTE — CONSULT NOTE ADULT - ASSESSMENT
IMPRESSION:    History of lupus  COPD  Pulmonary nodules  History of PE/DVT      RECOMMENDATIONS:  Prednisone 40mg for 5 days then 20mg for 5 days  Keep SpO2>92%  Smoking cessation  Continue anticoagulation  Cardiology eval  Will need to follow up with pulmonary as outpatient IMPRESSION:    History of lupus  COPD  Pulmonary nodules  History of PE/DVT      RECOMMENDATIONS:    Prednisone 40mg for 5 days then 20mg for 5 days  cefpodoxime 200 mg q 12  Keep SpO2>92%  Smoking cessation  Continue anticoagulation  Will need to follow up with pulmonary as outpatient

## 2019-06-20 NOTE — CONSULT NOTE ADULT - SUBJECTIVE AND OBJECTIVE BOX
Patient is a 48y old  Female who presents with a chief complaint of Syncope (2019 10:03)      HPI: well known to me and hospital, numerous admission, multiple complains and medical problem, latest vocal cord surgery, chronic pain of the back, long time using analgesics, hx of arrhythmia, AICD, presented with syncope, ECG, enzymes, blood test, xray, cardiac monitoring all resulted unremarkable, awaiting interrogation of the AICD     Chief Complaint: Syncope     47 y/o F with significant PMH of lupus with lupus anticoagulant and rheumatoid arthritis for more than 10 years with different treatments tried with no improvement, CHF s/p AICD+PPM (per the patient she was diagnosed with CHF in  after a long hospital stay although cause is unknown which required temporary pacemaker which was then upgraded to an AICD due to recurrent v tach), DVT/PE on Xarelto in  it seems unprovoked diagnosed after sudden onset LLE pain, NSTEMI also in , COPD not home O2 and never intubated, laryngeal cancer s/p excision in november, esophageal thrush with laryngeal candidiasis, sepsis secondary to LUE cellulitis , Klebsiella R middle lobe pneumonia presents for syncope     History goes back to the day of presentation when patient felt lightheaded suddenly, followed by nausea and emesis. After emesis she felt better and as she walked back to bed she passed out (she says about 5 minutes after the episode). The last thing she remembers after that is her  trying to wake her up. The syncope happenned suddenly with no presyncope symptoms, however she had palpitations just before. She was passed out for 1 minute according to the  and regained mental status in 10 seconds after. There was no body shaking, no urine loss and no tongue biting. Patient has a history of syncope however per the patient these were always preceded by presyncope, unlike this time.   The patient endorses that since January she has had episodes of palpitations which then when she would measure her BP would be associated with high blood pressure (as high as 214/160) and a HR as high as 160. Per the patient her AICD was last interrogated in 2019 which showed no events.     In the ED, her initial vitals showed a BP of 167/131, HR of 132, Temp of 36.9, SpO2 of 99% on room air. She was given 1L bolus of LR and morphine IV. Lab work showed a creatinine of 1.1, negative troponin X1, BNP of 333. CTPA was negative for PE but showed several pulmonary nodules, CT abdomen was negative for acute pathology, CT head was negative. (2019 04:54)      PAST MEDICAL & SURGICAL HISTORY:  DVT (deep venous thrombosis)  COPD (chronic obstructive pulmonary disease)  CHF (congestive heart failure)  HTN (hypertension)  RA (rheumatoid arthritis)  Throat cancer  AICD (automatic cardioverter/defibrillator) present  Lupus  History of laryngeal cancer  S/P cholecystectomy  S/P appendectomy      PREVIOUS DIAGNOSTIC TESTING:      ECHO  FINDINGS: < from: Transthoracic Echocardiogram (18 @ 09:15) >   1. LV Ejection Fraction by Hendricks's Method with a biplane EF of 65 %.   2. Normal left ventricular size and wall thicknesses, with normal   systolic function.   3. Spectral Doppler shows impaired relaxation pattern of left   ventricular myocardial filling (Grade I diastolic dysfunction).   4. Mild tricuspid regurgitation.   5. Pulmonic valve regurgitation.    < end of copied text >      STRESS TEST  FINDINGS: NORMAL OUT PATIENT NUCLEAR STRESS TEST    EDICATIONS  (STANDING):  folic acid 1 milliGRAM(s) Oral daily  hydrALAZINE 50 milliGRAM(s) Oral daily  metoprolol succinate ER 25 milliGRAM(s) Oral daily  predniSONE   Tablet 40 milliGRAM(s) Oral daily  rivaroxaban 20 milliGRAM(s) Oral with dinner  zinc sulfate 220 milliGRAM(s) Oral daily    MEDICATIONS  (PRN):  ALBUTerol/ipratropium for Nebulization 3 milliLiter(s) Nebulizer every 6 hours PRN Shortness of Breath and/or Wheezing  ALPRAZolam 2 milliGRAM(s) Oral every 8 hours PRN anxiety  HYDROmorphone   Tablet 4 milliGRAM(s) Oral three times a day PRN Severe Pain (7 - 10)      FAMILY HISTORY:      SOCIAL HISTORY:  CIGARETTES:  ALCOHOL:  DRUGS:                      REVIEW OF SYSTEMS:  CONSTITUTIONAL: No distress, Looks stable  NECK: No pain or stiffnes  RESPIRATORY: No cough, wheezing, shortness of breath  CARDIOVASCULAR: No chest pain, SOB, palpitations, leg swelling  GASTROINTESTINAL: No abdominal or epigastric pain. No nausea, vomiting, or hematemesis;  No melena.  NEUROLOGICAL: No dizziness, headaches, memory loss, loss of strength  SKIN: No itching, burning, rashes, or lesions   ENDOCRINE: No heat or cold intolerance  MUSCULOSKELETAL: No joint pain, No  swelling; No muscle pain  PSYCHIATRIC: No depression, anxiety, mood swings, or difficulty sleeping  ALLERGY: No hives, itching, rash          Vital Signs Last 24 Hrs  T(C): 36.4 (2019 05:05), Max: 36.9 (2019 22:05)  T(F): 97.6 (2019 05:05), Max: 98.5 (2019 22:05)  HR: 111 (2019 11:53) (86 - 132)  BP: 142/89 (2019 11:53) (129/75 - 167/131)  BP(mean): --  RR: 18 (2019 05:05) (18 - 22)  SpO2: 93% (2019 06:28) (93% - 99%)                      PHYSICAL EXAM:  GENERAL: No distress, well developed  HEAD:  Atraumatic, Normocephalic  NECK: Supple, No JVD, No Bruit of either carotid arteries  NERVOUS SYSTEM:  Alert, Awake, Oriented to time, place, person; Normal memory and speech; Normal motor Strength 5/5 B/L upper and lower extremities  CHEST/LUNG: Normal air entry to lung base bilaterally; No wheeze, crackle, rales, rhonchi  HEART: Regular heart beat, S1, A2, P2, No S3, No S4, No gallop, No murmur  ABDOMEN: Soft, Non tender, Non distended; Bowel sounds present  EXTREMITIES:  2+ Peripheral Pulses, No clubbing, No edema  SKIN: No rashes or lesions    TELEMETRY:    ECG:    I&O's Detail      LABS:                        13.5   6.81  )-----------( 165      ( 2019 22:41 )             41.2     -    137  |  99  |  11  ----------------------------<  105<H>  4.1   |  27  |  1.1    Ca    9.3      2019 22:41  Mg     2.0         TPro  7.1  /  Alb  4.2  /  TBili  0.3  /  DBili  x   /  AST  15  /  ALT  9   /  AlkPhos  83      CARDIAC MARKERS ( 2019 22:41 )  x     / <0.01 ng/mL / x     / x     / x          PT/INR - ( 2019 22:41 )   PT: 11.70 sec;   INR: 1.02 ratio         PTT - ( 2019 22:41 )  PTT:54.6 sec  Urinalysis Basic - ( 2019 23:20 )    Color: Dark Yellow / Appearance: Cloudy / S.025 / pH: x  Gluc: x / Ketone: Trace  / Bili: Small / Urobili: 1.0 mg/dL   Blood: x / Protein: 30 mg/dL / Nitrite: Negative   Leuk Esterase: Moderate / RBC: 26-50 /HPF / WBC 26-50 /HPF   Sq Epi: x / Non Sq Epi: Many /HPF / Bacteria: Moderate /HPF      I&O's Summary      RADIOLOGY & ADDITIONAL STUDIES: Patient is a 48y old  Female who presents with a chief complaint of Syncope (2019 10:03)      HPI: well known to me and hospital, numerous admission, multiple complains and medical problem, latest vocal cord surgery, chronic pain of the back, long time using analgesics, hx of arrhythmia, AICD, presented with syncope, ECG, enzymes, blood test, xray, cardiac monitoring all resulted unremarkable, awaiting interrogation of the AICD  she complains of fever fatigue, pain, inability to function or move properly     Chief Complaint: Syncope     49 y/o F with significant PMH of lupus with lupus anticoagulant and rheumatoid arthritis for more than 10 years with different treatments tried with no improvement, CHF s/p AICD+PPM (per the patient she was diagnosed with CHF in  after a long hospital stay although cause is unknown which required temporary pacemaker which was then upgraded to an AICD due to recurrent v tach), DVT/PE on Xarelto in  it seems unprovoked diagnosed after sudden onset LLE pain, NSTEMI also in , COPD not home O2 and never intubated, laryngeal cancer s/p excision in november, esophageal thrush with laryngeal candidiasis, sepsis secondary to LUE cellulitis , Klebsiella R middle lobe pneumonia presents for syncope     History goes back to the day of presentation when patient felt lightheaded suddenly, followed by nausea and emesis. After emesis she felt better and as she walked back to bed she passed out (she says about 5 minutes after the episode). The last thing she remembers after that is her  trying to wake her up. The syncope happenned suddenly with no presyncope symptoms, however she had palpitations just before. She was passed out for 1 minute according to the  and regained mental status in 10 seconds after. There was no body shaking, no urine loss and no tongue biting. Patient has a history of syncope however per the patient these were always preceded by presyncope, unlike this time.   The patient endorses that since January she has had episodes of palpitations which then when she would measure her BP would be associated with high blood pressure (as high as 214/160) and a HR as high as 160. Per the patient her AICD was last interrogated in 2019 which showed no events.     In the ED, her initial vitals showed a BP of 167/131, HR of 132, Temp of 36.9, SpO2 of 99% on room air. She was given 1L bolus of LR and morphine IV. Lab work showed a creatinine of 1.1, negative troponin X1, BNP of 333. CTPA was negative for PE but showed several pulmonary nodules, CT abdomen was negative for acute pathology, CT head was negative. (2019 04:54)      PAST MEDICAL & SURGICAL HISTORY:  DVT (deep venous thrombosis)  COPD (chronic obstructive pulmonary disease)  CHF (congestive heart failure)  HTN (hypertension)  RA (rheumatoid arthritis)  Throat cancer  AICD (automatic cardioverter/defibrillator) present  Lupus  History of laryngeal cancer  S/P cholecystectomy  S/P appendectomy      PREVIOUS DIAGNOSTIC TESTING:      ECHO  FINDINGS: < from: Transthoracic Echocardiogram (18 @ 09:15) >   1. LV Ejection Fraction by Hendricks's Method with a biplane EF of 65 %.   2. Normal left ventricular size and wall thicknesses, with normal   systolic function.   3. Spectral Doppler shows impaired relaxation pattern of left   ventricular myocardial filling (Grade I diastolic dysfunction).   4. Mild tricuspid regurgitation.   5. Pulmonic valve regurgitation.    < end of copied text >      STRESS TEST  FINDINGS: NORMAL OUT PATIENT NUCLEAR STRESS TEST    EDICATIONS  (STANDING):  folic acid 1 milliGRAM(s) Oral daily  hydrALAZINE 50 milliGRAM(s) Oral daily  metoprolol succinate ER 25 milliGRAM(s) Oral daily  predniSONE   Tablet 40 milliGRAM(s) Oral daily  rivaroxaban 20 milliGRAM(s) Oral with dinner  zinc sulfate 220 milliGRAM(s) Oral daily    MEDICATIONS  (PRN):  ALBUTerol/ipratropium for Nebulization 3 milliLiter(s) Nebulizer every 6 hours PRN Shortness of Breath and/or Wheezing  ALPRAZolam 2 milliGRAM(s) Oral every 8 hours PRN anxiety  HYDROmorphone   Tablet 4 milliGRAM(s) Oral three times a day PRN Severe Pain (7 - 10)      FAMILY HISTORY:      SOCIAL HISTORY:  CIGARETTES: smoker  ALCOHOL: social   DRUGS: multiple prescription medicines in her life                      REVIEW OF SYSTEMS:  CONSTITUTIONAL: No distress,  NECK: No pain   RESPIRATORY: cough, wheezing, shortness of breath  CARDIOVASCULAR: No chest pain, palpitations, leg swelling  GASTROINTESTINAL: No abdominal or epigastric pain. No nausea, vomiting, or hematemesis;  No melena.  NEUROLOGICAL: occasional dizziness, headaches after fall LOC, no loss of strength  SKIN: No itching, burning, rashes, or lesions   ENDOCRINE: menopausal symptom  MUSCULOSKELETAL: joint pain, back pain, No  leg swelling  PSYCHIATRIC: depression, anxiety, mood swings, or difficulty of sleeping          Vital Signs Last 24 Hrs  T(C): 36.4 (2019 05:05), Max: 36.9 (2019 22:05)  T(F): 97.6 (2019 05:05), Max: 98.5 (2019 22:05)  HR: 111 (2019 11:53) (86 - 132)  BP: 142/89 (2019 11:53) (129/75 - 167/131)  BP(mean): --  RR: 18 (2019 05:05) (18 - 22)  SpO2: 93% (2019 06:28) (93% - 99%)                      PHYSICAL EXAM:  GENERAL: No distress, just upset  HEAD:  Atraumatic, Normocephalic  NECK: Supple, No JVD, No Bruit   NERVOUS SYSTEM:  Alert, Awake, Oriented to time, place, person; Normal memory and speech; Normal motor Strength 5/5 B/L upper and lower extremities  CHEST/LUNG: Normal air entry to lung base bilaterally; but wheeze, coarse crackle, rhonchi  HEART: Regular heart beat, S1, A2, P2, No S3, No gallop,  ABDOMEN: Soft, Non tender, Non distended; Bowel sounds present  EXTREMITIES:  2+ Peripheral Pulses, No clubbing, No edema  SKIN: No rashes or lesions    TELEMETRY: nsr    ECG: < from: 12 Lead ECG (19 @ 22:06) >   Sinus tachycardia  Possible Left atrial enlargement  Poor R wave progression    < end of copied text >      I&O's Detail      LABS:                        13.5   6.81  )-----------( 165      ( 2019 22:41 )             41.2     -    137  |  99  |  11  ----------------------------<  105<H>  4.1   |  27  |  1.1    Ca    9.3      2019 22:41  Mg     2.0     -    TPro  7.1  /  Alb  4.2  /  TBili  0.3  /  DBili  x   /  AST  15  /  ALT  9   /  AlkPhos  83  06-19    CARDIAC MARKERS ( 2019 22:41 )  x     / <0.01 ng/mL / x     / x     / x          PT/INR - ( 2019 22:41 )   PT: 11.70 sec;   INR: 1.02 ratio         PTT - ( 2019 22:41 )  PTT:54.6 sec  Urinalysis Basic - ( 2019 23:20 )    Color: Dark Yellow / Appearance: Cloudy / S.025 / pH: x  Gluc: x / Ketone: Trace  / Bili: Small / Urobili: 1.0 mg/dL   Blood: x / Protein: 30 mg/dL / Nitrite: Negative   Leuk Esterase: Moderate / RBC: 26-50 /HPF / WBC 26-50 /HPF   Sq Epi: x / Non Sq Epi: Many /HPF / Bacteria: Moderate /HPF      I&O's Summary      RADIOLOGY & ADDITIONAL STUDIES:

## 2019-06-20 NOTE — CONSULT NOTE ADULT - SUBJECTIVE AND OBJECTIVE BOX
CHIEF COMPLAINT:Patient is a 48y old  Female who presents with a chief complaint of Syncope (20 Jun 2019 04:54)      HISTORY OF PRESENT ILLNESS:     Chief Complaint: Syncope     47 y/o F with significant PMH of lupus with lupus anticoagulant and rheumatoid arthritis for more than 10 years with different treatments tried with no improvement, CHF s/p AICD+PPM (per the patient she was diagnosed with CHF in 2011 after a long hospital stay although cause is unkown which required temporary pacemaker which was then upgraded to an AICD due to recurrent v tach), DVT/PE on xarelto in 2015 it seems unprovoked diagnosed after sudden onset LLE pain, NSTEMI also in 2015, COPD not home O2 and never intubated, laryngeal cancer s/p excision in november, esophageal thrush with laryngeal candidiasis, sepsis secondary to LUE cellulitis , Klebsiella R middle lobe pneumonia presents for syncope     History goes back to the day of presentation when patient felt lightheaded suddenly, followed by nausea and emesis. After emesis she felt better and as she walked back to bed she passed out (she says about 5 minutes after the episode). The last thing she remembers after that is her  trying to wake her up. The syncope happenned suddenly with no presyncope symptoms, however she had palpitations just before. She was passed out for 1 minute according to the  and regained mental status in 10 seconds after. There was no body shaking, no urine loss and no tongue biting. Patient has a history of syncope however per the patient these were always preceded by presyncope, unlike this time.   The patient endorses that since January she has had episodes of palpitations which then when she would measure her BP would be associated with high blood pressure (as high as 214/160) and a HR as high as 160. Per the patient her AICD was last interrogated in 2/2019 which showed no events.     In the ED, her initial vitals showed a BP of 167/131, HR of 132, Temp of 36.9, SpO2 of 99% on room air. She was given 1L bolus of LR and morphine IV. Lab work showed a creatinine of 1.1, negative troponin X1, BNP of 333. CTPA was negative for PE but showed several pulmonary nodules, CT abdomen was negative for acute pathology, CT head was negative. (20 Jun 2019 04:54)    PAST MEDICAL & SURGICAL HISTORY:  DVT (deep venous thrombosis)  COPD (chronic obstructive pulmonary disease)  CHF (congestive heart failure)  HTN (hypertension)  RA (rheumatoid arthritis)  Throat cancer  AICD (automatic cardioverter/defibrillator) present  Lupus  History of laryngeal cancer  S/P cholecystectomy  S/P appendectomy    FAMILY HISTORY:    Allergies    Avelox (Other)  Plaquenil (Other)  Vantin (Other (Mild))    Intolerances    	  Home Medications:  Benlysta: 200 milligram(s) intravenous once a week (17 Apr 2019 12:27)  Breo Ellipta 100 mcg-25 mcg/inh inhalation powder: 1 puff(s) inhaled once a day (17 Apr 2019 12:27)  Dilaudid 4 mg oral tablet: 1 tab(s) orally 3 times a day (20 Jun 2019 06:45)  Flexeril 10 mg oral tablet: 1 tab(s) orally once a day (20 Jun 2019 06:42)  folic acid 1 mg oral tablet: 1 tab(s) orally once a day (17 Apr 2019 12:27)  hydrALAZINE 50 mg oral tablet: 1 tab(s) orally once a day (20 Jun 2019 06:40)  NexIUM 40 mg oral delayed release capsule: 1 cap(s) orally once a day (20 Jun 2019 06:42)  ProAir HFA 90 mcg/inh inhalation aerosol: 2 puff(s) inhaled 4 times a day (20 Jun 2019 06:43)  Reglan 10 mg oral tablet: 1 tab(s) orally 4 times a day (before meals and at bedtime) (20 Jun 2019 06:41)  Ventolin 90 mcg/inh inhalation aerosol: 2 puff(s) inhaled 3 times (20 Jun 2019 06:43)  Vitamin B12 1000 mcg/mL injectable solution: injectable 2 times a week (17 Apr 2019 12:27)  Xanax 1 mg oral tablet: 2 tab(s) orally every 8 hours, As Needed (17 Apr 2019 12:27)  Xarelto 20 mg oral tablet: 1 tab(s) orally once a day (in the evening) (17 Apr 2019 12:27)  Zinc 50 mg Pink oral capsule: 1 cap(s) orally once a day (20 Jun 2019 06:44)    MEDICATIONS  (STANDING):  folic acid 1 milliGRAM(s) Oral daily  hydrALAZINE 50 milliGRAM(s) Oral daily  HYDROmorphone   Tablet 4 milliGRAM(s) Oral three times a day  metoprolol succinate ER 25 milliGRAM(s) Oral daily  morphine ER Tablet 100 milliGRAM(s) Oral two times a day  predniSONE   Tablet 40 milliGRAM(s) Oral daily  rivaroxaban 20 milliGRAM(s) Oral with dinner  zinc sulfate 220 milliGRAM(s) Oral daily    MEDICATIONS  (PRN):  ALBUTerol/ipratropium for Nebulization 3 milliLiter(s) Nebulizer every 6 hours PRN Shortness of Breath and/or Wheezing  ALPRAZolam 2 milliGRAM(s) Oral every 8 hours PRN anxiety        SOCIAL HISTORY:    [  ] active smoker  [ x ] non smoker - accd to patient quit approx. 1 yr ago  [  ] Etoh  [  ] recreational drugs    REVIEW OF SYSTEMS:  14 point ROS negative except as mentioned above in HPI    PHYSICAL EXAM:  T(C): 36.4 (06-20-19 @ 05:05), Max: 36.9 (06-19-19 @ 22:05)  HR: 86 (06-20-19 @ 05:05) (86 - 132)  BP: 142/84 (06-20-19 @ 05:05) (129/75 - 167/131)  RR: 18 (06-20-19 @ 05:05) (18 - 22)  SpO2: 93% (06-20-19 @ 06:28) (93% - 99%)  Wt(kg): --  I&O's Summary      General Appearance: in NAD	  HEENT: NC, No JVD appreciated  Cardiovascular: Normal S1 S2, tachycardic, No murmurs  Respiratory: b/l insp. crackles and b/l exp. wheezing  Gastrointestinal:  Soft, Non-tender, BS +	  Neurologic: No focal deficits, AAOx3  Extremities: ROM wnl, No clubbing/cyanosis, mild edema b/l   Skin: No rashes, No ecchymoses, No cyanosis  Vascular: Peripheral pulses palpable 2+ bilaterally    LABS:	 	                        13.5   6.81  )-----------( 165      ( 19 Jun 2019 22:41 )             41.2     06-19    137  |  99  |  11  ----------------------------<  105<H>  4.1   |  27  |  1.1    Ca    9.3      19 Jun 2019 22:41  Mg     2.0     06-19    TPro  7.1  /  Alb  4.2  /  TBili  0.3  /  DBili  x   /  AST  15  /  ALT  9   /  AlkPhos  83  06-19    eGFR if Non African American: 59 mL/min/1.73M2 (06-19-19 @ 22:41)        Serum Pro-Brain Natriuretic Peptide: 331 pg/mL (06-19-19 @ 22:41)      CARDIAC MARKERS:  06-19-19 @ 22:41  TROPONIN-T  <0.01 ng/mL  CKMB  --  CREATINE KINASE  --      ECG:   	< from: 12 Lead ECG (06.19.19 @ 22:06) >  Diagnosis Line Sinus tachycardia  Possible Left atrial enlargement  Poor R wave progression  Abnormal ECG    Confirmed by Ubaldo Lees ([id number]) on 6/20/2019 8:23:59 AM    < end of copied text >    RADIOLOGY:  < from: Xray Chest 2 Views PA/Lat (06.22.18 @ 12:12) >  Impression:      Right middle lobe pneumonia.    New.        MANJULA BENITEZ M.D., ATTENDING RADIOLOGIST  This document has been electronically signed. Jun 22 2018 12:15PM    < end of copied text >  	      PREVIOUS DIAGNOSTIC TESTING:    [x ] Echocardiogram:  < from: Transthoracic Echocardiogram (06.07.18 @ 09:15) >    Summary:   1. LV Ejection Fraction by Hendricks's Method with a biplane EF of 65 %.   2. Normal left ventricular size and wall thicknesses, with normal   systolic function.   3. Spectral Doppler shows impaired relaxation pattern of left   ventricular myocardial filling (Grade I diastolic dysfunction).   4. Mild tricuspid regurgitation.   5. Pulmonic valve regurgitation.    < end of copied text >

## 2019-06-20 NOTE — ED PROVIDER NOTE - OBJECTIVE STATEMENT
48 y.o female w/ hx of lupus, COPD, AICD, DVT on NOAC, HTN, RA, throat cancer presents to the ED for evaluation of syncope.  States that this evening walking and woke up on ground.  No preceding sxs.   heard pt fall and found pt unresponsive for about 1 minute.  No seizure like activity. Once pt woke up acting at baseline.  No further complaints at this time.  Denies chest pain, dyspnea, extremity pain, N/V/D, abd pain, fever, chills.  Also admits to tachycardia for past few weeks. Has not been worked up yet.

## 2019-06-20 NOTE — CONSULT NOTE ADULT - PROBLEM SELECTOR RECOMMENDATION 9
ALPRAZolam 2 milliGRAM(s) Oral every 8 hours PRN  HYDROmorphone   Tablet 4 milliGRAM(s) Oral three times a day PRN  Morphine ER 60mg PO Q12hrs   Acetaminophen 650mg PO Q6hrs neha  Lidoderm patch to lower back 12hrs on/off  Warm compress to lower back Q1hr x20 mins PRN

## 2019-06-20 NOTE — CONSULT NOTE ADULT - ASSESSMENT
# SLE  # AICD for HFrEF?  # Tachycardia, sinus  # Throat Ca  # COPD/PNA  # UTI?  # RA  # HTN  # DVT  # chr. Back aches    - c/w telemetry monitoring.   - AICD interogation  - f/u serial troponins q 4-6 h to r/o ACS  - check TSH, Mg and lytes  - TTE  reviewed  - pulmonary evaluation for COPD exacerbation  - sinus tachycardia in setting of possible UTI/PNA/COPD exacerbation. No cardiac arrythmia etiology identified on telemetry.  - optimize pain control for backache and inhaler usage   - c/w other home medications # SLE  # AICD for HFrEF?  # Tachycardia, sinus  # Throat Ca  # COPD/PNA  # UTI?  # RA  # HTN  # DVT  # chr. Back aches  * Sinus tachycardia /MIN   most likely due to use of B 2 Agonis inhalers  Recommend   - AICD interogation  - f/u serial troponins q 4-6 h to r/o ACS  - check TSH, Mg and lytes  - TTE  reviewed    - sinus tachycardia in setting of possible UTI/PNA/COPD exacerbation. No cardiac arrythmia etiology identified on telemetry.  - optimize pain control for backache and inhaler usage   - c/w other home medications

## 2019-06-21 LAB
ANION GAP SERPL CALC-SCNC: 15 MMOL/L — HIGH (ref 7–14)
BASOPHILS # BLD AUTO: 0.01 K/UL — SIGNIFICANT CHANGE UP (ref 0–0.2)
BASOPHILS NFR BLD AUTO: 0.1 % — SIGNIFICANT CHANGE UP (ref 0–1)
BUN SERPL-MCNC: 12 MG/DL — SIGNIFICANT CHANGE UP (ref 10–20)
C3 SERPL-MCNC: 128 MG/DL — SIGNIFICANT CHANGE UP (ref 81–157)
C4 SERPL-MCNC: 25 MG/DL — SIGNIFICANT CHANGE UP (ref 13–39)
CALCIUM SERPL-MCNC: 10.3 MG/DL — HIGH (ref 8.5–10.1)
CHLORIDE SERPL-SCNC: 99 MMOL/L — SIGNIFICANT CHANGE UP (ref 98–110)
CO2 SERPL-SCNC: 25 MMOL/L — SIGNIFICANT CHANGE UP (ref 17–32)
CREAT SERPL-MCNC: 0.9 MG/DL — SIGNIFICANT CHANGE UP (ref 0.7–1.5)
CRP SERPL-MCNC: 1.23 MG/DL — HIGH (ref 0–0.4)
EOSINOPHIL # BLD AUTO: 0.01 K/UL — SIGNIFICANT CHANGE UP (ref 0–0.7)
EOSINOPHIL NFR BLD AUTO: 0.1 % — SIGNIFICANT CHANGE UP (ref 0–8)
GLUCOSE SERPL-MCNC: 107 MG/DL — HIGH (ref 70–99)
HCT VFR BLD CALC: 41.7 % — SIGNIFICANT CHANGE UP (ref 37–47)
HGB BLD-MCNC: 13.5 G/DL — SIGNIFICANT CHANGE UP (ref 12–16)
IMM GRANULOCYTES NFR BLD AUTO: 0.4 % — HIGH (ref 0.1–0.3)
LYMPHOCYTES # BLD AUTO: 1.57 K/UL — SIGNIFICANT CHANGE UP (ref 1.2–3.4)
LYMPHOCYTES # BLD AUTO: 18.7 % — LOW (ref 20.5–51.1)
MAGNESIUM SERPL-MCNC: 1.9 MG/DL — SIGNIFICANT CHANGE UP (ref 1.8–2.4)
MCHC RBC-ENTMCNC: 28.4 PG — SIGNIFICANT CHANGE UP (ref 27–31)
MCHC RBC-ENTMCNC: 32.4 G/DL — SIGNIFICANT CHANGE UP (ref 32–37)
MCV RBC AUTO: 87.6 FL — SIGNIFICANT CHANGE UP (ref 81–99)
MONOCYTES # BLD AUTO: 0.51 K/UL — SIGNIFICANT CHANGE UP (ref 0.1–0.6)
MONOCYTES NFR BLD AUTO: 6.1 % — SIGNIFICANT CHANGE UP (ref 1.7–9.3)
NEUTROPHILS # BLD AUTO: 6.28 K/UL — SIGNIFICANT CHANGE UP (ref 1.4–6.5)
NEUTROPHILS NFR BLD AUTO: 74.6 % — SIGNIFICANT CHANGE UP (ref 42.2–75.2)
NRBC # BLD: 0 /100 WBCS — SIGNIFICANT CHANGE UP (ref 0–0)
PLATELET # BLD AUTO: 210 K/UL — SIGNIFICANT CHANGE UP (ref 130–400)
POTASSIUM SERPL-MCNC: 4.7 MMOL/L — SIGNIFICANT CHANGE UP (ref 3.5–5)
POTASSIUM SERPL-SCNC: 4.7 MMOL/L — SIGNIFICANT CHANGE UP (ref 3.5–5)
RBC # BLD: 4.76 M/UL — SIGNIFICANT CHANGE UP (ref 4.2–5.4)
RBC # FLD: 13.6 % — SIGNIFICANT CHANGE UP (ref 11.5–14.5)
SODIUM SERPL-SCNC: 139 MMOL/L — SIGNIFICANT CHANGE UP (ref 135–146)
WBC # BLD: 8.41 K/UL — SIGNIFICANT CHANGE UP (ref 4.8–10.8)
WBC # FLD AUTO: 8.41 K/UL — SIGNIFICANT CHANGE UP (ref 4.8–10.8)

## 2019-06-21 PROCEDURE — 93970 EXTREMITY STUDY: CPT | Mod: 26

## 2019-06-21 PROCEDURE — 99232 SBSQ HOSP IP/OBS MODERATE 35: CPT | Mod: 25

## 2019-06-21 PROCEDURE — 99233 SBSQ HOSP IP/OBS HIGH 50: CPT

## 2019-06-21 PROCEDURE — 93306 TTE W/DOPPLER COMPLETE: CPT | Mod: 26

## 2019-06-21 PROCEDURE — 93660 TILT TABLE EVALUATION: CPT | Mod: 26

## 2019-06-21 RX ORDER — MORPHINE SULFATE 50 MG/1
120 CAPSULE, EXTENDED RELEASE ORAL ONCE
Refills: 0 | Status: DISCONTINUED | OUTPATIENT
Start: 2019-06-21 | End: 2019-06-21

## 2019-06-21 RX ORDER — MORPHINE SULFATE 50 MG/1
180 CAPSULE, EXTENDED RELEASE ORAL
Refills: 0 | Status: DISCONTINUED | OUTPATIENT
Start: 2019-06-21 | End: 2019-06-21

## 2019-06-21 RX ORDER — CEFPODOXIME PROXETIL 100 MG
200 TABLET ORAL EVERY 12 HOURS
Refills: 0 | Status: DISCONTINUED | OUTPATIENT
Start: 2019-06-21 | End: 2019-06-21

## 2019-06-21 RX ORDER — MORPHINE SULFATE 50 MG/1
120 CAPSULE, EXTENDED RELEASE ORAL
Refills: 0 | Status: DISCONTINUED | OUTPATIENT
Start: 2019-06-21 | End: 2019-06-23

## 2019-06-21 RX ORDER — MORPHINE SULFATE 50 MG/1
120 CAPSULE, EXTENDED RELEASE ORAL
Refills: 0 | Status: DISCONTINUED | OUTPATIENT
Start: 2019-06-21 | End: 2019-06-21

## 2019-06-21 RX ORDER — HYDROMORPHONE HYDROCHLORIDE 2 MG/ML
4 INJECTION INTRAMUSCULAR; INTRAVENOUS; SUBCUTANEOUS THREE TIMES A DAY
Refills: 0 | Status: DISCONTINUED | OUTPATIENT
Start: 2019-06-21 | End: 2019-06-23

## 2019-06-21 RX ORDER — MORPHINE SULFATE 50 MG/1
180 CAPSULE, EXTENDED RELEASE ORAL
Refills: 0 | Status: DISCONTINUED | OUTPATIENT
Start: 2019-06-21 | End: 2019-06-23

## 2019-06-21 RX ADMIN — HYDROMORPHONE HYDROCHLORIDE 4 MILLIGRAM(S): 2 INJECTION INTRAMUSCULAR; INTRAVENOUS; SUBCUTANEOUS at 21:03

## 2019-06-21 RX ADMIN — LIDOCAINE 1 PATCH: 4 CREAM TOPICAL at 13:51

## 2019-06-21 RX ADMIN — LIDOCAINE 1 PATCH: 4 CREAM TOPICAL at 19:00

## 2019-06-21 RX ADMIN — Medication 2 MILLIGRAM(S): at 22:14

## 2019-06-21 RX ADMIN — MORPHINE SULFATE 60 MILLIGRAM(S): 50 CAPSULE, EXTENDED RELEASE ORAL at 06:52

## 2019-06-21 RX ADMIN — Medication 40 MILLIGRAM(S): at 06:00

## 2019-06-21 RX ADMIN — Medication 2 MILLIGRAM(S): at 10:34

## 2019-06-21 RX ADMIN — Medication 25 MILLIGRAM(S): at 06:00

## 2019-06-21 RX ADMIN — MORPHINE SULFATE 120 MILLIGRAM(S): 50 CAPSULE, EXTENDED RELEASE ORAL at 13:49

## 2019-06-21 RX ADMIN — MORPHINE SULFATE 120 MILLIGRAM(S): 50 CAPSULE, EXTENDED RELEASE ORAL at 14:30

## 2019-06-21 RX ADMIN — LIDOCAINE 1 PATCH: 4 CREAM TOPICAL at 04:00

## 2019-06-21 RX ADMIN — HYDROMORPHONE HYDROCHLORIDE 4 MILLIGRAM(S): 2 INJECTION INTRAMUSCULAR; INTRAVENOUS; SUBCUTANEOUS at 21:38

## 2019-06-21 RX ADMIN — Medication 1 MILLIGRAM(S): at 13:50

## 2019-06-21 RX ADMIN — Medication 110 MILLIGRAM(S): at 21:02

## 2019-06-21 RX ADMIN — HYDROMORPHONE HYDROCHLORIDE 4 MILLIGRAM(S): 2 INJECTION INTRAMUSCULAR; INTRAVENOUS; SUBCUTANEOUS at 00:00

## 2019-06-21 RX ADMIN — Medication 50 MILLIGRAM(S): at 06:00

## 2019-06-21 RX ADMIN — MORPHINE SULFATE 120 MILLIGRAM(S): 50 CAPSULE, EXTENDED RELEASE ORAL at 01:37

## 2019-06-21 RX ADMIN — MORPHINE SULFATE 120 MILLIGRAM(S): 50 CAPSULE, EXTENDED RELEASE ORAL at 22:15

## 2019-06-21 RX ADMIN — RIVAROXABAN 20 MILLIGRAM(S): KIT at 17:20

## 2019-06-21 RX ADMIN — MORPHINE SULFATE 120 MILLIGRAM(S): 50 CAPSULE, EXTENDED RELEASE ORAL at 02:10

## 2019-06-21 RX ADMIN — MORPHINE SULFATE 120 MILLIGRAM(S): 50 CAPSULE, EXTENDED RELEASE ORAL at 22:45

## 2019-06-21 RX ADMIN — MORPHINE SULFATE 60 MILLIGRAM(S): 50 CAPSULE, EXTENDED RELEASE ORAL at 07:14

## 2019-06-21 NOTE — PROGRESS NOTE ADULT - SUBJECTIVE AND OBJECTIVE BOX
OVERNIGHT EVENTS: cough, wheezing    Vital Signs Last 24 Hrs  T(C): 36.4 (2019 13:24), Max: 36.4 (2019 13:24)  T(F): 97.6 (2019 13:24), Max: 97.6 (2019 13:24)  HR: 80 (2019 13:24) (73 - 80)  BP: 139/74 (2019 13:24) (127/75 - 139/74)  BP(mean): --  RR: 20 (2019 13:24) (16 - 20)  SpO2: 94% (2019 20:47) (94% - 94%)    PHYSICAL EXAMINATION:    GENERAL: The patient is awake and alert in no apparent distress.     HEENT: Head is normocephalic and atraumatic. Extraocular muscles are intact. Mucous membranes are moist.    NECK: Supple.    LUNGS: b/l rhonchi/ wheezing    HEART: Regular rate and rhythm without murmur.    ABDOMEN: Soft, nontender, and nondistended.      EXTREMITIES: Without any cyanosis, clubbing, rash, lesions or edema.    NEUROLOGIC: Grossly intact.    SKIN: No ulceration or induration present.      LABS:                        13.5   8.41  )-----------( 210      ( 2019 05:28 )             41.7     -21    139  |  99  |  12  ----------------------------<  107<H>  4.7   |  25  |  0.9    Ca    10.3<H>      2019 05:28  Mg     1.9         TPro  7.1  /  Alb  4.2  /  TBili  0.3  /  DBili  x   /  AST  15  /  ALT  9   /  AlkPhos  83  06-19    PT/INR - ( 2019 22:41 )   PT: 11.70 sec;   INR: 1.02 ratio         PTT - ( 2019 22:41 )  PTT:54.6 sec  Urinalysis Basic - ( 2019 23:20 )    Color: Dark Yellow / Appearance: Cloudy / S.025 / pH: x  Gluc: x / Ketone: Trace  / Bili: Small / Urobili: 1.0 mg/dL   Blood: x / Protein: 30 mg/dL / Nitrite: Negative   Leuk Esterase: Moderate / RBC: 26-50 /HPF / WBC 26-50 /HPF   Sq Epi: x / Non Sq Epi: Many /HPF / Bacteria: Moderate /HPF        CARDIAC MARKERS ( 2019 11:42 )  x     / <0.01 ng/mL / x     / x     / x      CARDIAC MARKERS ( 2019 22:41 )  x     / <0.01 ng/mL / x     / x     / x            Serum Pro-Brain Natriuretic Peptide: 331 pg/mL (19 @ 22:41)            19 @ 07:01  -  19 @ 14:05  --------------------------------------------------------  IN: 450 mL / OUT: 0 mL / NET: 450 mL        MICROBIOLOGY:      MEDICATIONS  (STANDING):  acetaminophen   Tablet .. 650 milliGRAM(s) Oral every 6 hours  cefpodoxime 200 milliGRAM(s) Oral every 12 hours  folic acid 1 milliGRAM(s) Oral daily  hydrALAZINE 50 milliGRAM(s) Oral daily  HYDROmorphone   Tablet 4 milliGRAM(s) Oral three times a day  lidocaine   Patch 1 Patch Transdermal daily  metoprolol succinate ER 25 milliGRAM(s) Oral daily  morphine ER Tablet 180 milliGRAM(s) Oral <User Schedule>  morphine ER Tablet 120 milliGRAM(s) Oral <User Schedule>  rivaroxaban 20 milliGRAM(s) Oral with dinner  zinc sulfate 220 milliGRAM(s) Oral daily    MEDICATIONS  (PRN):  ALBUTerol/ipratropium for Nebulization 3 milliLiter(s) Nebulizer every 6 hours PRN Shortness of Breath and/or Wheezing  ALPRAZolam 2 milliGRAM(s) Oral every 8 hours PRN anxiety      RADIOLOGY & ADDITIONAL STUDIES:

## 2019-06-21 NOTE — PROGRESS NOTE ADULT - SUBJECTIVE AND OBJECTIVE BOX
Patient provided picture of prescription bottle yesterday of Morphine ER 60mg. Bottle empty. Directions state: 3 tabs in the am and 2 tabs in the afternoon and night. Discussed with Resident and recommended the same to be continued.   Contacted patient pain service,  Nikhil HUNT, [address] [place] [address], Phone: [phone]. Waiting for call back.   Rec'd messages from patient's , [phone].

## 2019-06-21 NOTE — PROGRESS NOTE ADULT - PROBLEM SELECTOR PLAN 1
acetaminophen   Tablet .. 650 milliGRAM(s) Oral every 6 hours  ALPRAZolam 2 milliGRAM(s) Oral every 8 hours PRN  morphine ER Tablet 180 milliGRAM(s) Oral <User Schedule>  morphine ER Tablet 120 milliGRAM(s) Oral <User Schedule>  Dilaudid 4mg PO Q8hrs PRN

## 2019-06-21 NOTE — PROGRESS NOTE ADULT - ASSESSMENT
Patient is a 48y old Female who presents with a chief complaint passing out. Currently admitted to medicine with the primary diagnosis of Syncope. Car    # Syncope Per the history it seems likely cardiac in origin as was sudden, no presyncope symptoms, was no related to change in position. Could be vasovagal because patient had emesis and nausea 5 minutes before. CT head negative. Unlikely seizure, given description. Infection unlikely. Patient has positive UA but asymptotic no fever or leukocytosis. Cardiology consulted, echo 6/21/2019 with EF 63%, mild aortic stenosis, interrogation of AICD was neg, cardiac cause unlikely.  - D/c tele monitoring   - Monitor electrolytes and keep K > 4 and Mg > 2    # CHF s/p AICD - Patient currently euvolemic. Echo in 2018 with EF of 65% and G1DD, Echo 6/21/2019 with EF 63%, mild aortic stenosis. Patient not on Lasix, ace or arb  - Continue hydralazine and toprol.   - F/u cardiology     # COPD - Increased sputum and shortness of breath.   - PO prednisone taper   - Appreciate pulm consult recs, adding cefpodoxime 200 mg q 12 for 7 days starting 6/21  - Glendybs   - Pulm follow up (Dr. Yamil Martinez)     # Lupus/RA - Uncontrolled Patient still with pain. On Benlystix injection for RA. UA with some protein. CRP 1.23, C3 AND C4 wnl.  - Sent dsdna   - F/u rheumatology  - Pain management consulted, appreciate their recs:  - acetaminophen   Tablet .. 650 milliGRAM(s) Oral every 6 hours  - ALPRAZolam 2 milliGRAM(s) Oral every 8 hours PRN  - morphine ER Tablet 180 milliGRAM(s) Oral <User Schedule>  - morphine ER Tablet 120 milliGRAM(s) Oral <User Schedule>  - Dilaudid 4mg PO Q8hrs PRN.     # Asymptomatic bacteriuria   - Off antibiotics for now   - Sent urine culture     # DVT/PE - Per history the patient gave it seems unprovoked. Could be secondary to lupus anticoagulant   - Continue with the Xarelto for now     # CKD stage 2 - Stable     GI PPX: Pantoprazole   DVT PPX: On Xarelto    DIET: DASH   ACTIVITY:  () Ad Talita  /  (X) Advance as Tolerated  /  () Bed Rest  /  () Fall Precaution  /  () Seizure precaution    ================= PRESENT TODAY ==================    1-Rodarte Catheter: No  Indication:  2-Vascular Access:  [X]Peripheral | Indication:  3-IV Fluids: Off | Indication:  4-Ventilation: Room air | Sat:     ================= DISPOSITION ==================    Patient to be discharged when condition(s) optimized.            Discharge to: Home when cleared              Home services:              Counseled on/Discussed cessation of:  () Risky behaviors  /  () Smoking cessation  /  () Diet  /  () Exercise  /  () Other    ================= CODE STATUS =================                  (X) FULL CODE     |     () DNR     |     () DNI    () Discussion with patient and/or family regarding goals of care

## 2019-06-21 NOTE — PROGRESS NOTE ADULT - SUBJECTIVE AND OBJECTIVE BOX
Called the patient's pain service and spoke with ASUNCION Saldana. Confirmed patient is on MSContin 60mg 3 tabs in the am and 2 in the afternoon and 2 at night. ASUNCION Saldana states the patient had an appt. for yesterday at which time they were going to cut down the pain med dosage even further, can con't the same as the bottle for now. Dilaudid is 4mg PO Q8hrs. ASUNCION Saldana states the medication reduction is due to cancer remission no longer warrants the high dose opioids.

## 2019-06-21 NOTE — PROGRESS NOTE ADULT - SUBJECTIVE AND OBJECTIVE BOX
MARIA ELENAKARILYNDA VARELAS  48y Female    CHIEF COMPLAINT:    Patient is a 48y old  Female who presents with a chief complaint of Syncope (2019 15:42)      INTERVAL HPI/OVERNIGHT EVENTS:    Patient seen and examined.    ROS: All other systems are negative.    Vital Signs:    T(F): 97.6 (19 @ 13:24), Max: 97.6 (19 @ 13:24)  HR: 80 (19 @ 13:24) (73 - 80)  BP: 139/74 (19 @ 13:24) (127/75 - 139/74)  RR: 20 (19 @ 13:24) (16 - 20)  SpO2: 94% (19 @ 20:47) (94% - 94%)  I&O's Summary    2019 07:01  -  2019 16:47  --------------------------------------------------------  IN: 450 mL / OUT: 0 mL / NET: 450 mL      Daily     Daily Weight in k.9 (2019 05:11)  CAPILLARY BLOOD GLUCOSE          PHYSICAL EXAM:    GENERAL:  NAD  SKIN: No rashes or lesions  HENT: Atrumatic. Normocephalic. PERRL. Moist membranes.  NECK: Supple, No JVD. No lymphadenopathy.  PULMONARY: CTA B/L. No wheezing. No rales  CVS: Normal S1, S2. Rate and Rythm are regular. No murmurs.  ABDOMEN/GI: Soft, Nontender, Nondistended; BS present  EXTREMITIES: Peripheral pulses intact. No edema B/L LE.  NEUROLOGIC:  No motor or sensory deficit.  PSYCH: Alert & oriented x 3    Consultant(s) Notes Reviewed:  [x ] YES  [ ] NO  Care Discussed with Consultants/Other Providers [ x] YES  [ ] NO    EKG reviewed  Telemetry reviewed    LABS:                        13.5   8.41  )-----------( 210      ( 2019 05:28 )             41.7         139  |  99  |  12  ----------------------------<  107<H>  4.7   |  25  |  0.9    Ca    10.3<H>      2019 05:28  Mg     1.9         TPro  7.1  /  Alb  4.2  /  TBili  0.3  /  DBili  x   /  AST  15  /  ALT  9   /  AlkPhos  83      PT/INR - ( 2019 22:41 )   PT: 11.70 sec;   INR: 1.02 ratio         PTT - ( 2019 22:41 )  PTT:54.6 sec  Serum Pro-Brain Natriuretic Peptide: 331 pg/mL (19 @ 22:41)    Trop <0.01, CKMB --, CK --, 19 @ 11:42  Trop <0.01, CKMB --, CK --, 19 @ 22:41        RADIOLOGY & ADDITIONAL TESTS:    < from: Transthoracic Echocardiogram (19 @ 11:34) >    Summary:   1. Normal global left ventricular systolic function.   2. LV Ejection Fraction by Hendricks's Method with a biplane EF of 63 %.   3. Normal left ventricular internal cavity size.   4. Normal right atrial size.   5. There is no evidence of pericardial effusion.   6. Mild thickening of the anterior and posterior mitral valve leaflets.   7. Peak transaortic gradient equals 8.5 mmHg, mean transaortic gradient   equals 3.7 mmHg, the calculated aortic valve area equals 2.30 cm² by the   continuity equation consistent with mild aortic stenosis.      < end of copied text >    Imaging or report Personally Reviewed:  [ ] YES  [ ] NO    Medications:  Standing  acetaminophen   Tablet .. 650 milliGRAM(s) Oral every 6 hours  cefpodoxime 200 milliGRAM(s) Oral every 12 hours  folic acid 1 milliGRAM(s) Oral daily  hydrALAZINE 50 milliGRAM(s) Oral daily  HYDROmorphone   Tablet 4 milliGRAM(s) Oral three times a day  lidocaine   Patch 1 Patch Transdermal daily  metoprolol succinate ER 25 milliGRAM(s) Oral daily  morphine ER Tablet 180 milliGRAM(s) Oral <User Schedule>  morphine ER Tablet 120 milliGRAM(s) Oral <User Schedule>  rivaroxaban 20 milliGRAM(s) Oral with dinner  zinc sulfate 220 milliGRAM(s) Oral daily    PRN Meds  ALBUTerol/ipratropium for Nebulization 3 milliLiter(s) Nebulizer every 6 hours PRN  ALPRAZolam 2 milliGRAM(s) Oral every 8 hours PRN      Case discussed with resident    Care discussed with pt/family ILEANA MCNEIL  48y Female    CHIEF COMPLAINT:    Patient is a 48y old  Female who presents with a chief complaint of Syncope (2019 15:42)      INTERVAL HPI/OVERNIGHT EVENTS:    Patient seen and examined. C/O dry cough. No sob. No palpitations.    ROS: All other systems are negative.    Vital Signs:    T(F): 97.6 (19 @ 13:24), Max: 97.6 (19 @ 13:24)  HR: 80 (19 @ 13:24) (73 - 80)  BP: 139/74 (19 @ 13:24) (127/75 - 139/74)  RR: 20 (19 @ 13:24) (16 - 20)  SpO2: 94% (19 @ 20:47) (94% - 94%)  I&O's Summary    2019 07:01  -  2019 16:47  --------------------------------------------------------  IN: 450 mL / OUT: 0 mL / NET: 450 mL      Daily     Daily Weight in k.9 (2019 05:11)  CAPILLARY BLOOD GLUCOSE          PHYSICAL EXAM:    GENERAL:  NAD  SKIN: No rashes or lesions  HENT: Atraumatic Normocephalic. PERRL. Moist membranes.  NECK: Supple, No JVD. No lymphadenopathy.  PULMONARY: CTA B/L. No wheezing. No rales  CVS: Normal S1, S2. Rate and Rhythm are regular. No murmurs.  ABDOMEN/GI: Soft, Nontender, Nondistended; BS present  EXTREMITIES: Peripheral pulses intact. No edema B/L LE.  NEUROLOGIC:  No motor or sensory deficit.  PSYCH: Alert & oriented x 3    Consultant(s) Notes Reviewed:  [x ] YES  [ ] NO  Care Discussed with Consultants/Other Providers [ x] YES  [ ] NO    EKG reviewed  Telemetry reviewed    LABS:                        13.5   8.41  )-----------( 210      ( 2019 05:28 )             41.7         139  |  99  |  12  ----------------------------<  107<H>  4.7   |  25  |  0.9    Ca    10.3<H>      2019 05:28  Mg     1.9         TPro  7.1  /  Alb  4.2  /  TBili  0.3  /  DBili  x   /  AST  15  /  ALT  9   /  AlkPhos  83      PT/INR - ( 2019 22:41 )   PT: 11.70 sec;   INR: 1.02 ratio         PTT - ( 2019 22:41 )  PTT:54.6 sec  Serum Pro-Brain Natriuretic Peptide: 331 pg/mL (19 @ 22:41)    Trop <0.01, CKMB --, CK --, 19 @ 11:42  Trop <0.01, CKMB --, CK --, 19 @ 22:41        RADIOLOGY & ADDITIONAL TESTS:    < from: Transthoracic Echocardiogram (19 @ 11:34) >    Summary:   1. Normal global left ventricular systolic function.   2. LV Ejection Fraction by Hendricks's Method with a biplane EF of 63 %.   3. Normal left ventricular internal cavity size.   4. Normal right atrial size.   5. There is no evidence of pericardial effusion.   6. Mild thickening of the anterior and posterior mitral valve leaflets.   7. Peak transaortic gradient equals 8.5 mmHg, mean transaortic gradient   equals 3.7 mmHg, the calculated aortic valve area equals 2.30 cm² by the   continuity equation consistent with mild aortic stenosis.      < end of copied text >    Imaging or report Personally Reviewed:  [ ] YES  [ ] NO    Medications:  Standing  acetaminophen   Tablet .. 650 milliGRAM(s) Oral every 6 hours  cefpodoxime 200 milliGRAM(s) Oral every 12 hours  folic acid 1 milliGRAM(s) Oral daily  hydrALAZINE 50 milliGRAM(s) Oral daily  HYDROmorphone   Tablet 4 milliGRAM(s) Oral three times a day  lidocaine   Patch 1 Patch Transdermal daily  metoprolol succinate ER 25 milliGRAM(s) Oral daily  morphine ER Tablet 180 milliGRAM(s) Oral <User Schedule>  morphine ER Tablet 120 milliGRAM(s) Oral <User Schedule>  rivaroxaban 20 milliGRAM(s) Oral with dinner  zinc sulfate 220 milliGRAM(s) Oral daily    PRN Meds  ALBUTerol/ipratropium for Nebulization 3 milliLiter(s) Nebulizer every 6 hours PRN  ALPRAZolam 2 milliGRAM(s) Oral every 8 hours PRN      Case discussed with resident    Care discussed with pt/family

## 2019-06-21 NOTE — PROGRESS NOTE ADULT - ASSESSMENT
IMPRESSION:    History of lupus  COPD  Pulmonary nodules  History of PE/DVT      RECOMMENDATIONS:    Prednisone 40mg for 5 days then 20mg for 5 days  cefpodoxime 200 mg q 12 for 7 days  neb as needed  Keep SpO2>92%  Continue anticoagulation  Will need to follow up with pulmonary as outpatient

## 2019-06-21 NOTE — PROGRESS NOTE ADULT - SUBJECTIVE AND OBJECTIVE BOX
ILEANA MCNEIL 48y Female  MRN#: [id number]     SUBJECTIVE  Patient is a 48y old Female who presents with a chief complaint of Syncope (2019 14:05)      Today is hospital day 1d, and this morning she is lying in bed without distress.   No acute overnight events.     OBJECTIVE  PAST MEDICAL & SURGICAL HISTORY  DVT (deep venous thrombosis)  COPD (chronic obstructive pulmonary disease)  CHF (congestive heart failure)  HTN (hypertension)  RA (rheumatoid arthritis)  Throat cancer  AICD (automatic cardioverter/defibrillator) present  Lupus  History of laryngeal cancer  S/P cholecystectomy  S/P appendectomy    ALLERGIES:  Avelox (Other)  Plaquenil (Other)  Vantin (Other (Mild))    MEDICATIONS:  STANDING MEDICATIONS  acetaminophen   Tablet .. 650 milliGRAM(s) Oral every 6 hours  cefpodoxime 200 milliGRAM(s) Oral every 12 hours  folic acid 1 milliGRAM(s) Oral daily  hydrALAZINE 50 milliGRAM(s) Oral daily  HYDROmorphone   Tablet 4 milliGRAM(s) Oral three times a day  lidocaine   Patch 1 Patch Transdermal daily  metoprolol succinate ER 25 milliGRAM(s) Oral daily  morphine ER Tablet 180 milliGRAM(s) Oral <User Schedule>  morphine ER Tablet 120 milliGRAM(s) Oral <User Schedule>  rivaroxaban 20 milliGRAM(s) Oral with dinner  zinc sulfate 220 milliGRAM(s) Oral daily    PRN MEDICATIONS  ALBUTerol/ipratropium for Nebulization 3 milliLiter(s) Nebulizer every 6 hours PRN  ALPRAZolam 2 milliGRAM(s) Oral every 8 hours PRN    HOME MEDICATIONS  Home Medications:  Benlysta: 200 milligram(s) intravenous once a week (2019 12:27)  Breo Ellipta 100 mcg-25 mcg/inh inhalation powder: 1 puff(s) inhaled once a day (2019 12:27)  Dilaudid 4 mg oral tablet: 1 tab(s) orally 3 times a day (2019 06:45)  Flexeril 10 mg oral tablet: 1 tab(s) orally once a day (2019 06:42)  folic acid 1 mg oral tablet: 1 tab(s) orally once a day (2019 12:27)  hydrALAZINE 50 mg oral tablet: 1 tab(s) orally once a day (2019 06:40)  NexIUM 40 mg oral delayed release capsule: 1 cap(s) orally once a day (2019 06:42)  ProAir HFA 90 mcg/inh inhalation aerosol: 2 puff(s) inhaled 4 times a day (2019 06:43)  Reglan 10 mg oral tablet: 1 tab(s) orally 4 times a day (before meals and at bedtime) (2019 06:41)  Ventolin 90 mcg/inh inhalation aerosol: 2 puff(s) inhaled 3 times (2019 06:43)  Vitamin B12 1000 mcg/mL injectable solution: injectable 2 times a week (2019 12:27)  Xanax 1 mg oral tablet: 2 tab(s) orally every 8 hours, As Needed (2019 12:27)  Xarelto 20 mg oral tablet: 1 tab(s) orally once a day (in the evening) (2019 12:27)  Zinc 50 mg Pink oral capsule: 1 cap(s) orally once a day (2019 06:44)      VITAL SIGNS: Last 24 Hours  T(C): 36.4 (2019 13:24), Max: 36.4 (2019 13:24)  T(F): 97.6 (2019 13:24), Max: 97.6 (2019 13:24)  HR: 80 (2019 13:24) (73 - 80)  BP: 139/74 (2019 13:24) (127/75 - 139/74)  BP(mean): --  RR: 20 (2019 13:24) (16 - 20)  SpO2: 94% (2019 20:47) (94% - 94%)    19 @ 07:01  -  19 @ 15:43  --------------------------------------------------------  IN: 450 mL / OUT: 0 mL / NET: 450 mL        LABS:                        13.5   8.41  )-----------( 210      ( 2019 05:28 )             41.7         139  |  99  |  12  ----------------------------<  107<H>  4.7   |  25  |  0.9    Ca    10.3<H>      2019 05:28  Mg     1.9         TPro  7.1  /  Alb  4.2  /  TBili  0.3  /  DBili  x   /  AST  15  /  ALT  9   /  AlkPhos  83      LIVER FUNCTIONS - ( 2019 22:41 )  Alb: 4.2 g/dL / Pro: 7.1 g/dL / ALK PHOS: 83 U/L / ALT: 9 U/L / AST: 15 U/L / GGT: x           PT/INR - ( 2019 22:41 )   PT: 11.70 sec;   INR: 1.02 ratio         PTT - ( 2019 22:41 )  PTT:54.6 sec  Urinalysis Basic - ( 2019 23:20 )    Color: Dark Yellow / Appearance: Cloudy / S.025 / pH: x  Gluc: x / Ketone: Trace  / Bili: Small / Urobili: 1.0 mg/dL   Blood: x / Protein: 30 mg/dL / Nitrite: Negative   Leuk Esterase: Moderate / RBC: 26-50 /HPF / WBC 26-50 /HPF   Sq Epi: x / Non Sq Epi: Many /HPF / Bacteria: Moderate /HPF            CARDIAC MARKERS ( 2019 11:42 )  x     / <0.01 ng/mL / x     / x     / x      CARDIAC MARKERS ( 2019 22:41 )  x     / <0.01 ng/mL / x     / x     / x          CAPILLARY BLOOD GLUCOSE      POCT Blood Glucose.: 129 mg/dL (2019 22:04)      RADIOLOGY:   TTE: 1. Normal global left ventricular systolic function. 2. LV Ejection Fraction by Hendricks's Method with a biplane EF of 63 %. 3. Normal left ventricular internal cavity size. 4. Normal right atrial size. 5. There is no evidence of pericardial effusion. 6. Mild thickening of the anterior and posterior mitral valve leaflets. 7. Peak transaortic gradient equals 8.5 mmHg, mean transaortic gradient equals 3.7 mmHg, the calculated aortic valve area equals 2.30 cm² by the continuity equation consistent with mild aortic stenosis.    PHYSICAL EXAM:  PHYSICAL EXAM:  GENERAL: NAD, AAO x 4, 48y F  HEAD:  Atraumatic, Normocephalic  EYES: EOMI, conjunctiva and sclera white  NECK: Supple, No JVD  CHEST/LUNG: Clear to auscultation bilaterally; No wheeze; No crackles; No accessory muscles used  HEART: Regular rate and rhythm; No murmurs;   ABDOMEN: Soft, Nontender, Nondistended; Bowel sounds present; No guarding  EXTREMITIES:  2+ Peripheral Pulses, No cyanosis or edema  NEUROLOGY: non-focal    ADMISSION SUMMARY  Patient is a 48y old Female who presents with a chief complaint of Syncope (2019 14:05)

## 2019-06-22 PROCEDURE — 99233 SBSQ HOSP IP/OBS HIGH 50: CPT

## 2019-06-22 RX ORDER — SENNA PLUS 8.6 MG/1
2 TABLET ORAL AT BEDTIME
Refills: 0 | Status: DISCONTINUED | OUTPATIENT
Start: 2019-06-22 | End: 2019-06-23

## 2019-06-22 RX ORDER — ONDANSETRON 8 MG/1
4 TABLET, FILM COATED ORAL ONCE
Refills: 0 | Status: DISCONTINUED | OUTPATIENT
Start: 2019-06-22 | End: 2019-06-22

## 2019-06-22 RX ORDER — POLYETHYLENE GLYCOL 3350 17 G/17G
17 POWDER, FOR SOLUTION ORAL DAILY
Refills: 0 | Status: DISCONTINUED | OUTPATIENT
Start: 2019-06-22 | End: 2019-06-23

## 2019-06-22 RX ORDER — ONDANSETRON 8 MG/1
4 TABLET, FILM COATED ORAL ONCE
Refills: 0 | Status: COMPLETED | OUTPATIENT
Start: 2019-06-22 | End: 2019-06-23

## 2019-06-22 RX ADMIN — MORPHINE SULFATE 120 MILLIGRAM(S): 50 CAPSULE, EXTENDED RELEASE ORAL at 16:54

## 2019-06-22 RX ADMIN — ZINC SULFATE TAB 220 MG (50 MG ZINC EQUIVALENT) 220 MILLIGRAM(S): 220 (50 ZN) TAB at 12:18

## 2019-06-22 RX ADMIN — RIVAROXABAN 20 MILLIGRAM(S): KIT at 16:55

## 2019-06-22 RX ADMIN — HYDROMORPHONE HYDROCHLORIDE 4 MILLIGRAM(S): 2 INJECTION INTRAMUSCULAR; INTRAVENOUS; SUBCUTANEOUS at 23:15

## 2019-06-22 RX ADMIN — Medication 50 MILLIGRAM(S): at 05:53

## 2019-06-22 RX ADMIN — Medication 650 MILLIGRAM(S): at 17:29

## 2019-06-22 RX ADMIN — Medication 25 MILLIGRAM(S): at 05:53

## 2019-06-22 RX ADMIN — MORPHINE SULFATE 180 MILLIGRAM(S): 50 CAPSULE, EXTENDED RELEASE ORAL at 05:59

## 2019-06-22 RX ADMIN — Medication 40 MILLIGRAM(S): at 18:30

## 2019-06-22 RX ADMIN — HYDROMORPHONE HYDROCHLORIDE 4 MILLIGRAM(S): 2 INJECTION INTRAMUSCULAR; INTRAVENOUS; SUBCUTANEOUS at 07:44

## 2019-06-22 RX ADMIN — Medication 2 MILLIGRAM(S): at 09:47

## 2019-06-22 RX ADMIN — LIDOCAINE 1 PATCH: 4 CREAM TOPICAL at 17:23

## 2019-06-22 RX ADMIN — HYDROMORPHONE HYDROCHLORIDE 4 MILLIGRAM(S): 2 INJECTION INTRAMUSCULAR; INTRAVENOUS; SUBCUTANEOUS at 16:04

## 2019-06-22 RX ADMIN — HYDROMORPHONE HYDROCHLORIDE 4 MILLIGRAM(S): 2 INJECTION INTRAMUSCULAR; INTRAVENOUS; SUBCUTANEOUS at 22:39

## 2019-06-22 RX ADMIN — Medication 110 MILLIGRAM(S): at 05:54

## 2019-06-22 RX ADMIN — Medication 650 MILLIGRAM(S): at 13:27

## 2019-06-22 RX ADMIN — Medication 650 MILLIGRAM(S): at 12:14

## 2019-06-22 RX ADMIN — HYDROMORPHONE HYDROCHLORIDE 4 MILLIGRAM(S): 2 INJECTION INTRAMUSCULAR; INTRAVENOUS; SUBCUTANEOUS at 13:28

## 2019-06-22 RX ADMIN — Medication 100 MILLIGRAM(S): at 18:30

## 2019-06-22 RX ADMIN — MORPHINE SULFATE 180 MILLIGRAM(S): 50 CAPSULE, EXTENDED RELEASE ORAL at 06:30

## 2019-06-22 RX ADMIN — HYDROMORPHONE HYDROCHLORIDE 4 MILLIGRAM(S): 2 INJECTION INTRAMUSCULAR; INTRAVENOUS; SUBCUTANEOUS at 12:18

## 2019-06-22 RX ADMIN — LIDOCAINE 1 PATCH: 4 CREAM TOPICAL at 12:14

## 2019-06-22 RX ADMIN — MORPHINE SULFATE 120 MILLIGRAM(S): 50 CAPSULE, EXTENDED RELEASE ORAL at 17:23

## 2019-06-22 RX ADMIN — Medication 1 MILLIGRAM(S): at 12:14

## 2019-06-22 RX ADMIN — LIDOCAINE 1 PATCH: 4 CREAM TOPICAL at 01:00

## 2019-06-22 NOTE — PROGRESS NOTE ADULT - PROVIDER SPECIALTY LIST ADULT
Hospitalist
Internal Medicine
Internal Medicine
Pain Medicine
Pain Medicine
Pulmonology
Internal Medicine

## 2019-06-22 NOTE — PROGRESS NOTE ADULT - SUBJECTIVE AND OBJECTIVE BOX
CHIEF COMPLAINT:    Patient is a 48y old  Female who presents with a chief complaint of syncope     INTERVAL HPI/OVERNIGHT EVENTS:    Patient seen and examined at bedside. No acute overnight events occurred.    ROS: Reports SOB, wheezing. All other systems are negative.    Vital Signs:    T(F): 97.1 (19 @ 13:24), Max: 97.1 (19 @ 13:24)  HR: 84 (19 @ 13:24) (75 - 84)  BP: 151/73 (19 @ 13:24) (115/58 - 151/73)  RR: 17 (19 @ 13:24) (17 - 18)  SpO2: 94% (19 @ 05:00) (94% - 97%)  I&O's Summary    2019 07:01  -  2019 07:00  --------------------------------------------------------  IN: 450 mL / OUT: 0 mL / NET: 450 mL      Daily     Daily Weight in k.2 (2019 05:00)  CAPILLARY BLOOD GLUCOSE          PHYSICAL EXAM:  GENERAL:  NAD talking in full sentences, no labored breathing  SKIN: No rashes or lesions  HEENT: Atraumatic. Normocephalic. Anicteric  NECK:  No JVD.   PULMONARY: Clear to ausculation bilaterally. No wheezing. No rales  CVS: Normal S1, S2. Regular rate and rhythm. No murmurs.  ABDOMEN/GI: Soft, Nontender, Nondistended; Bowel sounds are present  EXTREMITIES:  No edema B/L LE.  NEUROLOGIC:  No motor deficit.  PSYCH: Alert & oriented x 3, normal affect    Consultant(s) Notes Reviewed:  [x ] YES  [ ] NO    LABS:                        13.5   8.41  )-----------( 210      ( 2019 05:28 )             41.7         139  |  99  |  12  ----------------------------<  107<H>  4.7   |  25  |  0.9    Ca    10.3<H>      2019 05:28  Mg     1.9             Serum Pro-Brain Natriuretic Peptide: 331 pg/mL (19 @ 22:41)    Trop <0.01, CKMB --, CK --, 19 @ 11:42  Trop <0.01, CKMB --, CK --, 19 @ 22:41        RADIOLOGY & ADDITIONAL TESTS:  No new images    Medications:  Standing  acetaminophen   Tablet .. 650 milliGRAM(s) Oral every 6 hours  doxycycline IVPB      doxycycline IVPB 100 milliGRAM(s) IV Intermittent every 12 hours  folic acid 1 milliGRAM(s) Oral daily  hydrALAZINE 50 milliGRAM(s) Oral daily  HYDROmorphone   Tablet 4 milliGRAM(s) Oral three times a day  lidocaine   Patch 1 Patch Transdermal daily  metoprolol succinate ER 25 milliGRAM(s) Oral daily  morphine ER Tablet 180 milliGRAM(s) Oral <User Schedule>  morphine ER Tablet 120 milliGRAM(s) Oral <User Schedule>  predniSONE   Tablet 40 milliGRAM(s) Oral daily  rivaroxaban 20 milliGRAM(s) Oral with dinner  zinc sulfate 220 milliGRAM(s) Oral daily    PRN Meds  ALBUTerol/ipratropium for Nebulization 3 milliLiter(s) Nebulizer every 6 hours PRN  ALPRAZolam 2 milliGRAM(s) Oral every 8 hours PRN  polyethylene glycol 3350 17 Gram(s) Oral daily PRN  senna 2 Tablet(s) Oral at bedtime PRN      Case discussed with resident  Care discussed with pt

## 2019-06-22 NOTE — PROGRESS NOTE ADULT - ASSESSMENT
Patient is a 48y old Female with significant PMH of lupus with lupus anticoagulant and rheumatoid arthritis for more than 10 years with different treatments tried with no improvement, CHF s/p AICD+PPM (per the patient she was diagnosed with CHF in 2011 after a long hospital stay although cause is unknown, which required temporary pacemaker which was then upgraded to an AICD due to recurrent v tach), DVT/PE on xarelto in 2015 (seems unprovoked, diagnosed after sudden onset LLE pain), NSTEMI also in 2015, COPD not home O2 and never intubated, laryngeal cancer s/p excision in november, esophageal thrush with laryngeal candidiasis, sepsis secondary to LUE cellulitis , Klebsiella R middle lobe pneumonia presents for syncope. On admission, found pulmonary nodule suspicious for infection, pulm following. Currently admitted to tele floor. Cardiology has seen her, cardiac cause unlikely, however patient still on abx for presumed pulmonary infection/PNA.    # Syncope Per the history it seems likely cardiac in origin as was sudden, no presyncope symptoms, was no related to change in position. Could be vasovagal because patient had emesis and nausea 5 minutes before. CT head negative. Unlikely seizure, given description. Infection unlikely. Patient has positive UA but asymptotic no fever or leukocytosis. Cardiology consulted, echo 6/21/2019 with EF 63%, mild aortic stenosis, interrogation of AICD was neg, cardiac cause unlikely, tele monitoring d/c'd 6/21.  - Monitor electrolytes and keep K > 4 and Mg > 2    # CHF s/p AICD - Patient currently euvolemic. Echo in 2018 with EF of 65% and G1DD, Echo 6/21/2019 with EF 63%, mild aortic stenosis. Patient not on Lasix, ace or arb  - Continue hydralazine and toprol   - F/u cardiology     # Lungs-COPD and pulmonary nodules  - PO prednisone taper   - Appreciate pulm consult recs, adding cefpodoxime 200 mg q 12 for 7 days starting 6/21  - Duonebs PRN  - Keep SpO2>92%  - Pulm follow up (Dr. Yamil Martinez)     # Lupus/RA - Uncontrolled Patient still with pain. On Benlystix injection for RA. UA with some protein. CRP 1.23, C3 AND C4 wnl.  - Sent dsdna   - Pain management consulted, appreciate their recs:  - acetaminophen   Tablet .. 650 milliGRAM(s) Oral every 6 hours  - ALPRAZolam 2 milliGRAM(s) Oral every 8 hours PRN  - morphine ER Tablet 180 milliGRAM(s) Oral <User Schedule>  - morphine ER Tablet 120 milliGRAM(s) Oral <User Schedule>  - Dilaudid 4mg PO Q8hrs PRN.   - F/u rheumatology    # Asymptomatic bacteriuria   - Sent urine culture    # DVT/PE Hx - Per history the patient gave it seems unprovoked. Could be secondary to lupus anticoagulant   - Continue with the Xarelto for now   - BLE Duplex to be cautious, since pt with LE edema L>R and patient is a smoker with lupus     # CKD stage 2 - Stable     GI PPX: Pantoprazole   DVT PPX: On Xarelto    DIET: DASH   ACTIVITY:  () Ad Talita  /  (X) Advance as Tolerated  /  () Bed Rest  /  () Fall Precaution  /  () Seizure precaution    ================= PRESENT TODAY ==================    1-Rodarte Catheter: No  Indication:  2-Vascular Access:  [X]Peripheral | Indication:  3-IV Fluids: Off | Indication:  4-Ventilation: Room air | Sat:     ================= DISPOSITION ==================    Patient to be discharged when condition(s) optimized.            Discharge to: Home when cleared              Home services:              Counseled on/Discussed cessation of:  () Risky behaviors  /  () Smoking cessation  /  () Diet  /  () Exercise  /  () Other    ================= CODE STATUS =================                  (X) FULL CODE     |     () DNR     |     () DNI    () Discussion with patient and/or family regarding goals of care Patient is a 48y old Female with significant PMH of lupus with lupus anticoagulant and rheumatoid arthritis for more than 10 years with different treatments tried with no improvement, CHF s/p AICD+PPM (per the patient she was diagnosed with CHF in 2011 after a long hospital stay although cause is unknown, which required temporary pacemaker which was then upgraded to an AICD due to recurrent v tach), DVT/PE on xarelto in 2015 (seems unprovoked, diagnosed after sudden onset LLE pain), NSTEMI also in 2015, COPD not home O2 and never intubated, laryngeal cancer s/p excision in november, esophageal thrush with laryngeal candidiasis, hx sepsis secondary to LUE cellulitis, prior Klebsiella R middle lobe pneumonia presents for syncope. On admission, found pulmonary nodule suspicious for infection, pulm following. Currently admitted to tele floor. Cardiology has seen her, cardiac cause unlikely, however patient still on abx for presumed pulmonary infection/PNA.    # Syncope Per the history it seems likely cardiac in origin as was sudden, no presyncope symptoms, was no related to change in positio, however cardiac work-up unrevealing. Could be vasovagal because patient had emesis and nausea 5 minutes before. CT head negative. Unlikely seizure, given description. Infection unlikely. Patient has positive UA but asymptotic no fever or leukocytosis. Cardiology consulted, echo 6/21/2019 with EF 63%, mild aortic stenosis, interrogation of AICD was neg, cardiac cause unlikely, tele monitoring d/c'd 6/21. Most likely vasovagal, given the emesis and that neuro and cardiac causes have been investigated and seem unlikely.  - Monitor electrolytes and keep K > 4 and Mg > 2    # CHF s/p AICD - Patient currently euvolemic. Echo in 2018 with EF of 65% and G1DD, Echo 6/21/2019 with EF 63%, mild aortic stenosis. Patient not on Lasix, ace or arb  - Continue hydralazine and toprol   - F/u cardiology     # Lungs-COPD and pulmonary nodules  - PO prednisone taper   - Appreciate pulm consult recs, adding cefpodoxime 200 mg q 12 for 7 days starting 6/21  - Duonebs PRN  - Keep SpO2>92%  - Pulm follow up (Dr. Yamil Martinez)     # Lupus/RA - Uncontrolled Patient still with pain. On Benlystix injection for RA. UA with some protein. CRP 1.23, C3 AND C4 wnl.  - Sent dsdna   - Pain management consulted, appreciate their recs:  - acetaminophen   Tablet .. 650 milliGRAM(s) Oral every 6 hours  - ALPRAZolam 2 milliGRAM(s) Oral every 8 hours PRN  - morphine ER Tablet 180 milliGRAM(s) Oral <User Schedule>  - morphine ER Tablet 120 milliGRAM(s) Oral <User Schedule>  - Dilaudid 4mg PO Q8hrs PRN.   - F/u rheumatology    # Asymptomatic bacteriuria   - Sent urine culture    # DVT/PE Hx - Per history the patient gave it seems unprovoked. Could be secondary to lupus anticoagulant   - Continue with the Xarelto for now   - BLE Duplex to be cautious, since pt with LE edema L>R and patient is a smoker with lupus     # CKD stage 2 - Stable     GI PPX: Pantoprazole   DVT PPX: On Xarelto    DIET: DASH   ACTIVITY:  OOB  DISPO: Back to home after results of duplex are received, if negative (if positive, will need to address). On d/c will start her on Doxycycline 100 mg po q 12h for six days and prednisone 40 mg po daily for five days on D/C.

## 2019-06-22 NOTE — PROGRESS NOTE ADULT - ASSESSMENT
48 years old female with PMHx of multiple medical problems presented to the ED with syncopal episode. Pt states that while going to bedroom she suddenly felt her head spinning and then became nauseous and threw up. After vomiting she felt better but passed out for about 5 minutes. Course complicated by acute bronchitis    Syncope  -likely related to narcotics  -echo unremarkable  -No events on device interrogation    History of unspecified CHF  -2d echo unremarkable  -has AICD    Acute bronchitis  -as per patient's conversation with pulmonary team she reports she should be on IV antibiotics until Monday.  -pulmonary note reviewed-PO vantin and PO prednisone  -pt started on doxycycline yesterday as she developes rash and flushing with vantin  -pt does not have IV access-every time resident went to room today to place IV she was not present  -I went to room 4 times spaced out over the day and pt was present only one time at 4:45 pm  -pt is not actively wheezing, has been walking around hospital  -talked for several minutes without stopping and did not have to stop to breath  -pt understands she is on PO prednisone and PO doxycycline as no IV access as she could be discharged today  -she is nervous that she will start vomiting with prednisone as she often does but is willing to trial predniosne here    Throat cancer  -outpt follow up    HTN  -controlled, c/w hydralazine    Ch. Hypercapnic respiratory failure  -stable    SLE/RA  -c/w morphine, hydromorphine, folic acid    Pt anticipated for discharge tomorrow    #Progress Note Handoff:  Pending (specify):  d/c in am  Family discussion: d/w  and pt at bedside at [facility] as outlined above  Disposition: Homex___/SNF___/Other________/Unknown at this time________

## 2019-06-22 NOTE — PROGRESS NOTE ADULT - SUBJECTIVE AND OBJECTIVE BOX
ILEANA MCNEIL 48y Female  MRN#: [id number]     SUBJECTIVE  Patient is a 48y old Female who presents with a chief complaint of Syncope (21 Jun 2019 16:46)      Today is hospital day 2d, and this morning she is lying in bed without distress.   No acute overnight events.     OBJECTIVE  PAST MEDICAL & SURGICAL HISTORY  DVT (deep venous thrombosis)  COPD (chronic obstructive pulmonary disease)  CHF (congestive heart failure)  HTN (hypertension)  RA (rheumatoid arthritis)  Throat cancer  AICD (automatic cardioverter/defibrillator) present  Lupus  History of laryngeal cancer  S/P cholecystectomy  S/P appendectomy    ALLERGIES:  Avelox (Other)  Plaquenil (Other)  Vantin (Other (Mild))    MEDICATIONS:  STANDING MEDICATIONS  acetaminophen   Tablet .. 650 milliGRAM(s) Oral every 6 hours  doxycycline IVPB      doxycycline IVPB 100 milliGRAM(s) IV Intermittent every 12 hours  folic acid 1 milliGRAM(s) Oral daily  hydrALAZINE 50 milliGRAM(s) Oral daily  HYDROmorphone   Tablet 4 milliGRAM(s) Oral three times a day  lidocaine   Patch 1 Patch Transdermal daily  metoprolol succinate ER 25 milliGRAM(s) Oral daily  morphine ER Tablet 180 milliGRAM(s) Oral <User Schedule>  morphine ER Tablet 120 milliGRAM(s) Oral <User Schedule>  predniSONE   Tablet 40 milliGRAM(s) Oral daily  rivaroxaban 20 milliGRAM(s) Oral with dinner  zinc sulfate 220 milliGRAM(s) Oral daily    PRN MEDICATIONS  ALBUTerol/ipratropium for Nebulization 3 milliLiter(s) Nebulizer every 6 hours PRN  ALPRAZolam 2 milliGRAM(s) Oral every 8 hours PRN    HOME MEDICATIONS  Home Medications:  Benlysta: 200 milligram(s) intravenous once a week (17 Apr 2019 12:27)  Breo Ellipta 100 mcg-25 mcg/inh inhalation powder: 1 puff(s) inhaled once a day (17 Apr 2019 12:27)  Dilaudid 4 mg oral tablet: 1 tab(s) orally 3 times a day (20 Jun 2019 06:45)  Flexeril 10 mg oral tablet: 1 tab(s) orally once a day (20 Jun 2019 06:42)  folic acid 1 mg oral tablet: 1 tab(s) orally once a day (17 Apr 2019 12:27)  hydrALAZINE 50 mg oral tablet: 1 tab(s) orally once a day (20 Jun 2019 06:40)  NexIUM 40 mg oral delayed release capsule: 1 cap(s) orally once a day (20 Jun 2019 06:42)  ProAir HFA 90 mcg/inh inhalation aerosol: 2 puff(s) inhaled 4 times a day (20 Jun 2019 06:43)  Reglan 10 mg oral tablet: 1 tab(s) orally 4 times a day (before meals and at bedtime) (20 Jun 2019 06:41)  Ventolin 90 mcg/inh inhalation aerosol: 2 puff(s) inhaled 3 times (20 Jun 2019 06:43)  Vitamin B12 1000 mcg/mL injectable solution: injectable 2 times a week (17 Apr 2019 12:27)  Xanax 1 mg oral tablet: 2 tab(s) orally every 8 hours, As Needed (17 Apr 2019 12:27)  Xarelto 20 mg oral tablet: 1 tab(s) orally once a day (in the evening) (17 Apr 2019 12:27)  Zinc 50 mg Pink oral capsule: 1 cap(s) orally once a day (20 Jun 2019 06:44)      VITAL SIGNS: Last 24 Hours  T(C): 35.7 (22 Jun 2019 05:00), Max: 36.4 (21 Jun 2019 13:24)  T(F): 96.2 (22 Jun 2019 05:00), Max: 97.6 (21 Jun 2019 13:24)  HR: 75 (22 Jun 2019 05:00) (75 - 83)  BP: 115/58 (22 Jun 2019 05:00) (115/58 - 139/74)  BP(mean): --  RR: 18 (22 Jun 2019 05:00) (18 - 20)  SpO2: 94% (22 Jun 2019 05:00) (94% - 97%)    06-21-19 @ 07:01  -  06-22-19 @ 07:00  --------------------------------------------------------  IN: 450 mL / OUT: 0 mL / NET: 450 mL        LABS:                        13.5   8.41  )-----------( 210      ( 21 Jun 2019 05:28 )             41.7     06-21    139  |  99  |  12  ----------------------------<  107<H>  4.7   |  25  |  0.9    Ca    10.3<H>      21 Jun 2019 05:28  Mg     1.9     06-21                      CAPILLARY BLOOD GLUCOSE          RADIOLOGY:    PHYSICAL EXAM:  General: NAD, A&O, 48yFemale  Head: NC, AT  Eyes: EOMI, conjunctiva and sclera white  Chest/lung: CTAB, no wheezes or crackles, no accessory muscles used  Heart: RRR, no m/r/g  Abdomen: Soft, NTND, no guarding  Ext: No cyanosis, erythema, or edema  Neurology: Non-focal    ADMISSION SUMMARY  Patient is a 48y old Female who presents with a chief complaint of Syncope (21 Jun 2019 16:46)

## 2019-06-23 ENCOUNTER — TRANSCRIPTION ENCOUNTER (OUTPATIENT)
Age: 49
End: 2019-06-23

## 2019-06-23 VITALS
TEMPERATURE: 98 F | RESPIRATION RATE: 18 BRPM | DIASTOLIC BLOOD PRESSURE: 85 MMHG | HEART RATE: 98 BPM | SYSTOLIC BLOOD PRESSURE: 139 MMHG

## 2019-06-23 PROCEDURE — 99238 HOSP IP/OBS DSCHRG MGMT 30/<: CPT

## 2019-06-23 RX ORDER — PREGABALIN 225 MG/1
0 CAPSULE ORAL
Qty: 0 | Refills: 0 | DISCHARGE

## 2019-06-23 RX ADMIN — Medication 25 MILLIGRAM(S): at 06:47

## 2019-06-23 RX ADMIN — Medication 2 MILLIGRAM(S): at 14:57

## 2019-06-23 RX ADMIN — ZINC SULFATE TAB 220 MG (50 MG ZINC EQUIVALENT) 220 MILLIGRAM(S): 220 (50 ZN) TAB at 12:19

## 2019-06-23 RX ADMIN — Medication 1 MILLIGRAM(S): at 12:19

## 2019-06-23 RX ADMIN — HYDROMORPHONE HYDROCHLORIDE 4 MILLIGRAM(S): 2 INJECTION INTRAMUSCULAR; INTRAVENOUS; SUBCUTANEOUS at 08:41

## 2019-06-23 RX ADMIN — LIDOCAINE 1 PATCH: 4 CREAM TOPICAL at 00:22

## 2019-06-23 RX ADMIN — Medication 650 MILLIGRAM(S): at 10:21

## 2019-06-23 RX ADMIN — LIDOCAINE 1 PATCH: 4 CREAM TOPICAL at 12:19

## 2019-06-23 RX ADMIN — MORPHINE SULFATE 180 MILLIGRAM(S): 50 CAPSULE, EXTENDED RELEASE ORAL at 06:46

## 2019-06-23 RX ADMIN — Medication 650 MILLIGRAM(S): at 00:59

## 2019-06-23 RX ADMIN — MORPHINE SULFATE 120 MILLIGRAM(S): 50 CAPSULE, EXTENDED RELEASE ORAL at 16:23

## 2019-06-23 RX ADMIN — Medication 2 MILLIGRAM(S): at 06:46

## 2019-06-23 RX ADMIN — MORPHINE SULFATE 120 MILLIGRAM(S): 50 CAPSULE, EXTENDED RELEASE ORAL at 00:59

## 2019-06-23 RX ADMIN — HYDROMORPHONE HYDROCHLORIDE 4 MILLIGRAM(S): 2 INJECTION INTRAMUSCULAR; INTRAVENOUS; SUBCUTANEOUS at 09:10

## 2019-06-23 RX ADMIN — MORPHINE SULFATE 180 MILLIGRAM(S): 50 CAPSULE, EXTENDED RELEASE ORAL at 10:21

## 2019-06-23 RX ADMIN — MORPHINE SULFATE 120 MILLIGRAM(S): 50 CAPSULE, EXTENDED RELEASE ORAL at 00:11

## 2019-06-23 RX ADMIN — ONDANSETRON 4 MILLIGRAM(S): 8 TABLET, FILM COATED ORAL at 00:11

## 2019-06-23 RX ADMIN — Medication 650 MILLIGRAM(S): at 06:47

## 2019-06-23 RX ADMIN — Medication 650 MILLIGRAM(S): at 00:10

## 2019-06-23 NOTE — DISCHARGE NOTE NURSING/CASE MANAGEMENT/SOCIAL WORK - NSDCDPATPORTLINK_GEN_ALL_CORE
You can access the [facility] Patient Portal, offered by [facility], by registering with the following website: [email or web address]

## 2019-06-23 NOTE — DISCHARGE NOTE PROVIDER - HOSPITAL COURSE
48 years old female with PMHx of multiple medical problems presented to the ED with syncopal episode. Pt states that while going to bedroom she suddenly felt her head spinning and then became nauseous and threw up. After vomiting she felt better but passed out for about 5 minutes. Course complicated by acute bronchitis        Syncope    -likely related to narcotics    -echo unremarkable    -No events on device interrogation        History of unspecified CHF    -2d echo unremarkable    -has AICD        Acute bronchitis    -PO prednisone and doxy        Right arm erythema and induration    -doxycycline            Throat cancer    -outpt follow up        HTN    -controlled, c/w hydralazine        Ch. Hypercapnic respiratory failure    -stable        SLE/RA    -c/w morphine, hydromorphine, folic acid        Pt anticipated for discharge tomorrow

## 2019-06-23 NOTE — DISCHARGE NOTE NURSING/CASE MANAGEMENT/SOCIAL WORK - NSDCPEEMAIL_GEN_ALL_CORE
[facility] for Tobacco Control email [email or web address]

## 2019-06-23 NOTE — DISCHARGE NOTE NURSING/CASE MANAGEMENT/SOCIAL WORK - NSDCPEWEB_GEN_ALL_CORE
[facility] for Tobacco Control website --- [email or web address] website --- [email or web address]

## 2019-06-23 NOTE — CHART NOTE - NSCHARTNOTEFT_GEN_A_CORE
Pt seen and examined at bedside. Pt states she was nauseous and vomitted overnight. She and her  state this is a weekly occurance when she has change in meds and diet. It always resolves when she takes her medications crushed in a slim fast shake.    Overnight she developed redness on her right forearm. It is warm and indurated. Suspected thrombophebitis vs cellulitis. Doxycycline will cover cellulitis. Pt insisting on going home.    PHYSICAL EXAM:  GENERAL: NAD, speaks in full sentences, no signs of respiratory distress  HEAD:  Atraumatic, Normocephalic  EYES: EOMI, PERRLA, conjunctiva and sclera clear  NECK: Supple, No JVD  CHEST/LUNG: Clear to auscultation bilaterally; No wheeze; No crackles; No accessory muscles used  HEART: Regular rate and rhythm; No murmurs;   ABDOMEN: Soft, Nontender, Nondistended; Bowel sounds present; No guarding  EXTREMITIES:  2+ Peripheral Pulses, No cyanosis or edema, right arm erythema, scratch marks  PSYCH: AAOx3  NEUROLOGY: non-focal  SKIN: No rashes or lesions    Pt stable for discharge home with doxy and prednisone. Pt in agreement to take these medications and follow up with PMD. She understands to see her PMD or return to ER if redness worses with red streaking, fever develops.

## 2019-06-23 NOTE — DISCHARGE NOTE PROVIDER - NSDCCPCAREPLAN_GEN_ALL_CORE_FT
PRINCIPAL DISCHARGE DIAGNOSIS  Diagnosis: Syncope  Assessment and Plan of Treatment: likely relatead to medications.      SECONDARY DISCHARGE DIAGNOSES  Diagnosis: Acute asthmatic bronchitis  Assessment and Plan of Treatment: continue taking prednisone and doxycycline as prescribed    Diagnosis: Cellulitis of right arm  Assessment and Plan of Treatment: Take doxycycline as prescribed. If arm gets worse or if you noticed red streak up arm please see PMD or return to ED

## 2019-06-25 LAB — DSDNA AB SER-ACNC: <12 IU/ML — SIGNIFICANT CHANGE UP

## 2019-07-03 DIAGNOSIS — I13.0 HYPERTENSIVE HEART AND CHRONIC KIDNEY DISEASE WITH HEART FAILURE AND STAGE 1 THROUGH STAGE 4 CHRONIC KIDNEY DISEASE, OR UNSPECIFIED CHRONIC KIDNEY DISEASE: ICD-10-CM

## 2019-07-03 DIAGNOSIS — Z85.818 PERSONAL HISTORY OF MALIGNANT NEOPLASM OF OTHER SITES OF LIP, ORAL CAVITY, AND PHARYNX: ICD-10-CM

## 2019-07-03 DIAGNOSIS — J44.9 CHRONIC OBSTRUCTIVE PULMONARY DISEASE, UNSPECIFIED: ICD-10-CM

## 2019-07-03 DIAGNOSIS — R55 SYNCOPE AND COLLAPSE: ICD-10-CM

## 2019-07-03 DIAGNOSIS — J20.9 ACUTE BRONCHITIS, UNSPECIFIED: ICD-10-CM

## 2019-07-03 DIAGNOSIS — R00.0 TACHYCARDIA, UNSPECIFIED: ICD-10-CM

## 2019-07-03 DIAGNOSIS — N18.2 CHRONIC KIDNEY DISEASE, STAGE 2 (MILD): ICD-10-CM

## 2019-07-03 DIAGNOSIS — Z95.810 PRESENCE OF AUTOMATIC (IMPLANTABLE) CARDIAC DEFIBRILLATOR: ICD-10-CM

## 2019-07-03 DIAGNOSIS — G89.29 OTHER CHRONIC PAIN: ICD-10-CM

## 2019-07-03 DIAGNOSIS — M54.9 DORSALGIA, UNSPECIFIED: ICD-10-CM

## 2019-07-03 DIAGNOSIS — J96.12 CHRONIC RESPIRATORY FAILURE WITH HYPERCAPNIA: ICD-10-CM

## 2019-07-03 DIAGNOSIS — M06.9 RHEUMATOID ARTHRITIS, UNSPECIFIED: ICD-10-CM

## 2019-07-03 DIAGNOSIS — Z90.49 ACQUIRED ABSENCE OF OTHER SPECIFIED PARTS OF DIGESTIVE TRACT: ICD-10-CM

## 2019-07-03 DIAGNOSIS — Z88.8 ALLERGY STATUS TO OTHER DRUGS, MEDICAMENTS AND BIOLOGICAL SUBSTANCES: ICD-10-CM

## 2019-07-03 DIAGNOSIS — R82.71 BACTERIURIA: ICD-10-CM

## 2019-07-03 DIAGNOSIS — I35.0 NONRHEUMATIC AORTIC (VALVE) STENOSIS: ICD-10-CM

## 2019-07-03 DIAGNOSIS — Z79.811 LONG TERM (CURRENT) USE OF AROMATASE INHIBITORS: ICD-10-CM

## 2019-07-03 DIAGNOSIS — Z86.718 PERSONAL HISTORY OF OTHER VENOUS THROMBOSIS AND EMBOLISM: ICD-10-CM

## 2019-07-03 DIAGNOSIS — M32.9 SYSTEMIC LUPUS ERYTHEMATOSUS, UNSPECIFIED: ICD-10-CM

## 2019-07-03 DIAGNOSIS — T44.5X5A ADVERSE EFFECT OF PREDOMINANTLY BETA-ADRENORECEPTOR AGONISTS, INITIAL ENCOUNTER: ICD-10-CM

## 2019-07-03 DIAGNOSIS — Z87.891 PERSONAL HISTORY OF NICOTINE DEPENDENCE: ICD-10-CM

## 2019-07-03 DIAGNOSIS — Z79.01 LONG TERM (CURRENT) USE OF ANTICOAGULANTS: ICD-10-CM

## 2019-07-03 DIAGNOSIS — R91.8 OTHER NONSPECIFIC ABNORMAL FINDING OF LUNG FIELD: ICD-10-CM

## 2019-07-03 DIAGNOSIS — I50.32 CHRONIC DIASTOLIC (CONGESTIVE) HEART FAILURE: ICD-10-CM

## 2019-07-30 ENCOUNTER — APPOINTMENT (OUTPATIENT)
Dept: NEUROSURGERY | Facility: CLINIC | Age: 49
End: 2019-07-30

## 2019-08-27 ENCOUNTER — OTHER (OUTPATIENT)
Age: 49
End: 2019-08-27

## 2019-08-30 ENCOUNTER — OTHER (OUTPATIENT)
Age: 49
End: 2019-08-30

## 2019-08-30 RX ORDER — FLUCONAZOLE 200 MG/1
200 TABLET ORAL DAILY
Qty: 10 | Refills: 0 | Status: ACTIVE | COMMUNITY
Start: 2018-04-27 | End: 1900-01-01

## 2019-09-03 ENCOUNTER — APPOINTMENT (OUTPATIENT)
Dept: CARDIOLOGY | Facility: CLINIC | Age: 49
End: 2019-09-03
Payer: MEDICARE

## 2019-09-03 PROCEDURE — 93295 DEV INTERROG REMOTE 1/2/MLT: CPT

## 2019-09-03 PROCEDURE — 93296 REM INTERROG EVL PM/IDS: CPT

## 2019-10-29 ENCOUNTER — APPOINTMENT (OUTPATIENT)
Dept: NEUROSURGERY | Facility: CLINIC | Age: 49
End: 2019-10-29
Payer: MEDICARE

## 2019-10-29 ENCOUNTER — APPOINTMENT (OUTPATIENT)
Dept: NEUROSURGERY | Facility: CLINIC | Age: 49
End: 2019-10-29

## 2019-10-29 VITALS — HEIGHT: 63 IN | BODY MASS INDEX: 29.95 KG/M2 | WEIGHT: 169 LBS

## 2019-10-29 PROCEDURE — 99204 OFFICE O/P NEW MOD 45 MIN: CPT

## 2019-10-29 NOTE — ASSESSMENT
[FreeTextEntry1] : 49 years old lady who is s/p right lumbar 4/5 laminectomy for herniated disc with good improvement of her radiculopathy, She does suffer from chronic low back pain, rheumatoid arthritis, and Lupus. She has been under the care of pain specialist for her multiple pain.  And she has been on multiple strong narcotic pain meds.\par \par Hx of throat cancer.\par \par On exam, she has positive SLR on the right and equivocal on the left. Tenderness of the low back to palpation. Pain is on the midline the most. Pain-inhibited weakness on the right LE. Walking with an antalgic gait. She describes the right leg pain as similar to her pre-op sx preop over 20 years ago. It feels like electricity traveling sown the leg when she moves. \par \par I would like her to undergo a lumbar myelogram to determine if she has an L5-S1 hnp. She has a pacemaker that makes MRI not possible. I will see her again after the myelogram.\par \par

## 2019-10-31 NOTE — ASSESSMENT
[FreeTextEntry1] : \par 49 years old lady who is s/p right lumbar 4/5 laminectomy for herniated disc with good improvement of her radiculopathy, She does suffer from chronic low back pain, rheumatoid arthritis, and Lupus. She has been under the care of pain specialist for her multiple pain. And she has been on multiple strong narcotic pain meds.\par \par Hx of throat cancer.\par \par On exam, she has positive SLR on the right and equivocal on the left. Tenderness of the low back to palpation. Pain is on the midline the most. Pain-inhibited weakness on the right LE. Walking with an antalgic gait. She describes the right leg pain as similar to her pre-op sx preop over 20 years ago. It feels like electricity traveling sown the leg when she moves. \par \par I would like her to undergo a lumbar myelogram to determine if she has an L5-S1 hnp. She has a pacemaker that makes MRI not possible. I will see her again after the myelogram.\par

## 2019-11-01 ENCOUNTER — APPOINTMENT (OUTPATIENT)
Dept: OTOLARYNGOLOGY | Facility: CLINIC | Age: 49
End: 2019-11-01

## 2019-12-03 ENCOUNTER — APPOINTMENT (OUTPATIENT)
Dept: CARDIOLOGY | Facility: CLINIC | Age: 49
End: 2019-12-03
Payer: MEDICARE

## 2019-12-03 PROCEDURE — 93295 DEV INTERROG REMOTE 1/2/MLT: CPT

## 2019-12-03 PROCEDURE — 93296 REM INTERROG EVL PM/IDS: CPT

## 2019-12-17 ENCOUNTER — APPOINTMENT (OUTPATIENT)
Dept: OTOLARYNGOLOGY | Facility: CLINIC | Age: 49
End: 2019-12-17
Payer: MEDICARE

## 2019-12-17 VITALS
DIASTOLIC BLOOD PRESSURE: 89 MMHG | SYSTOLIC BLOOD PRESSURE: 130 MMHG | BODY MASS INDEX: 29.95 KG/M2 | HEIGHT: 63 IN | WEIGHT: 169 LBS

## 2019-12-17 DIAGNOSIS — R04.0 EPISTAXIS: ICD-10-CM

## 2019-12-17 PROCEDURE — 31238 NSL/SINS NDSC SRG NSL HEMRRG: CPT | Mod: LT

## 2019-12-17 PROCEDURE — 31575 DIAGNOSTIC LARYNGOSCOPY: CPT

## 2019-12-17 PROCEDURE — 99214 OFFICE O/P EST MOD 30 MIN: CPT | Mod: 25

## 2019-12-17 NOTE — REASON FOR VISIT
[Subsequent Evaluation] : a subsequent evaluation for [FreeTextEntry2] : epistaxis, and voice loss.

## 2019-12-17 NOTE — HISTORY OF PRESENT ILLNESS
[FreeTextEntry1] : 12/17/19 Patient is following up for left epistaxis, and voice loss. patient has been getting nose bleeds primarily in the left nostril for the last 2 weeks. last episode was this morning. She is on Xarelto. \par also her voice loss, and hoarseness has worsened. S/P multiple surgeries. \par her breathing \par Her lupus is flaring up, she is not on medication at this time due to kidney insufficiency. \par Also waiting for a new CT chast regarding her chest nodule.

## 2019-12-23 ENCOUNTER — APPOINTMENT (OUTPATIENT)
Dept: OTOLARYNGOLOGY | Facility: CLINIC | Age: 49
End: 2019-12-23

## 2020-01-24 ENCOUNTER — OUTPATIENT (OUTPATIENT)
Dept: OUTPATIENT SERVICES | Facility: HOSPITAL | Age: 50
LOS: 1 days | Discharge: HOME | End: 2020-01-24
Payer: MEDICARE

## 2020-01-24 DIAGNOSIS — Z90.49 ACQUIRED ABSENCE OF OTHER SPECIFIED PARTS OF DIGESTIVE TRACT: Chronic | ICD-10-CM

## 2020-01-24 DIAGNOSIS — Z12.31 ENCOUNTER FOR SCREENING MAMMOGRAM FOR MALIGNANT NEOPLASM OF BREAST: ICD-10-CM

## 2020-01-24 DIAGNOSIS — J44.9 CHRONIC OBSTRUCTIVE PULMONARY DISEASE, UNSPECIFIED: ICD-10-CM

## 2020-01-24 DIAGNOSIS — R31.9 HEMATURIA, UNSPECIFIED: ICD-10-CM

## 2020-01-24 DIAGNOSIS — Z85.21 PERSONAL HISTORY OF MALIGNANT NEOPLASM OF LARYNX: Chronic | ICD-10-CM

## 2020-01-24 PROCEDURE — 74178 CT ABD&PLV WO CNTR FLWD CNTR: CPT | Mod: 26

## 2020-01-24 PROCEDURE — 77067 SCR MAMMO BI INCL CAD: CPT | Mod: 26

## 2020-01-24 PROCEDURE — 71250 CT THORAX DX C-: CPT | Mod: 26

## 2020-01-24 PROCEDURE — 77063 BREAST TOMOSYNTHESIS BI: CPT | Mod: 26

## 2020-01-27 DIAGNOSIS — J44.9 CHRONIC OBSTRUCTIVE PULMONARY DISEASE, UNSPECIFIED: ICD-10-CM

## 2020-01-27 DIAGNOSIS — R31.9 HEMATURIA, UNSPECIFIED: ICD-10-CM

## 2020-01-27 DIAGNOSIS — Z02.9 ENCOUNTER FOR ADMINISTRATIVE EXAMINATIONS, UNSPECIFIED: ICD-10-CM

## 2020-01-30 ENCOUNTER — OUTPATIENT (OUTPATIENT)
Dept: OUTPATIENT SERVICES | Facility: HOSPITAL | Age: 50
LOS: 1 days | Discharge: HOME | End: 2020-01-30
Payer: MEDICARE

## 2020-01-30 DIAGNOSIS — R92.8 OTHER ABNORMAL AND INCONCLUSIVE FINDINGS ON DIAGNOSTIC IMAGING OF BREAST: ICD-10-CM

## 2020-01-30 DIAGNOSIS — Z90.49 ACQUIRED ABSENCE OF OTHER SPECIFIED PARTS OF DIGESTIVE TRACT: Chronic | ICD-10-CM

## 2020-01-30 DIAGNOSIS — Z85.21 PERSONAL HISTORY OF MALIGNANT NEOPLASM OF LARYNX: Chronic | ICD-10-CM

## 2020-01-30 PROCEDURE — 76642 ULTRASOUND BREAST LIMITED: CPT | Mod: 26,RT

## 2020-01-30 PROCEDURE — G0279: CPT | Mod: 26,RT

## 2020-01-30 PROCEDURE — 77065 DX MAMMO INCL CAD UNI: CPT | Mod: 26,RT

## 2020-02-03 ENCOUNTER — APPOINTMENT (OUTPATIENT)
Dept: OTOLARYNGOLOGY | Facility: CLINIC | Age: 50
End: 2020-02-03

## 2020-02-06 ENCOUNTER — OUTPATIENT (OUTPATIENT)
Dept: OUTPATIENT SERVICES | Facility: HOSPITAL | Age: 50
LOS: 1 days | Discharge: HOME | End: 2020-02-06
Payer: MEDICARE

## 2020-02-06 ENCOUNTER — RESULT REVIEW (OUTPATIENT)
Age: 50
End: 2020-02-06

## 2020-02-06 DIAGNOSIS — Z90.49 ACQUIRED ABSENCE OF OTHER SPECIFIED PARTS OF DIGESTIVE TRACT: Chronic | ICD-10-CM

## 2020-02-06 DIAGNOSIS — Z85.21 PERSONAL HISTORY OF MALIGNANT NEOPLASM OF LARYNX: Chronic | ICD-10-CM

## 2020-02-06 PROCEDURE — 88305 TISSUE EXAM BY PATHOLOGIST: CPT | Mod: 26

## 2020-02-06 PROCEDURE — 19081 BX BREAST 1ST LESION STRTCTC: CPT | Mod: RT

## 2020-02-07 LAB — SURGICAL PATHOLOGY STUDY: SIGNIFICANT CHANGE UP

## 2020-02-12 DIAGNOSIS — N63.10 UNSPECIFIED LUMP IN THE RIGHT BREAST, UNSPECIFIED QUADRANT: ICD-10-CM

## 2020-02-12 DIAGNOSIS — R92.8 OTHER ABNORMAL AND INCONCLUSIVE FINDINGS ON DIAGNOSTIC IMAGING OF BREAST: ICD-10-CM

## 2020-02-19 ENCOUNTER — APPOINTMENT (OUTPATIENT)
Dept: OTOLARYNGOLOGY | Facility: CLINIC | Age: 50
End: 2020-02-19

## 2020-03-03 ENCOUNTER — APPOINTMENT (OUTPATIENT)
Dept: CARDIOLOGY | Facility: CLINIC | Age: 50
End: 2020-03-03
Payer: MEDICARE

## 2020-03-03 PROCEDURE — 93296 REM INTERROG EVL PM/IDS: CPT

## 2020-03-03 PROCEDURE — 93295 DEV INTERROG REMOTE 1/2/MLT: CPT

## 2020-03-16 ENCOUNTER — APPOINTMENT (OUTPATIENT)
Dept: OTOLARYNGOLOGY | Facility: CLINIC | Age: 50
End: 2020-03-16

## 2020-06-02 ENCOUNTER — APPOINTMENT (OUTPATIENT)
Dept: CARDIOLOGY | Facility: CLINIC | Age: 50
End: 2020-06-02
Payer: MEDICARE

## 2020-06-02 PROCEDURE — 93295 DEV INTERROG REMOTE 1/2/MLT: CPT

## 2020-06-02 PROCEDURE — 93296 REM INTERROG EVL PM/IDS: CPT

## 2020-06-09 ENCOUNTER — APPOINTMENT (OUTPATIENT)
Dept: OTOLARYNGOLOGY | Facility: CLINIC | Age: 50
End: 2020-06-09

## 2020-07-29 ENCOUNTER — APPOINTMENT (OUTPATIENT)
Dept: OTOLARYNGOLOGY | Facility: CLINIC | Age: 50
End: 2020-07-29
Payer: MEDICARE

## 2020-07-29 DIAGNOSIS — Z87.09 PERSONAL HISTORY OF OTHER DISEASES OF THE RESPIRATORY SYSTEM: ICD-10-CM

## 2020-07-29 PROCEDURE — 31575 DIAGNOSTIC LARYNGOSCOPY: CPT

## 2020-07-29 PROCEDURE — 99214 OFFICE O/P EST MOD 30 MIN: CPT | Mod: 25

## 2020-07-29 RX ORDER — FAMOTIDINE 20 MG/1
20 TABLET, FILM COATED ORAL
Qty: 90 | Refills: 2 | Status: ACTIVE | COMMUNITY
Start: 2020-07-29 | End: 1900-01-01

## 2020-07-29 RX ORDER — NYSTATIN 100000 [USP'U]/ML
100000 SUSPENSION ORAL
Qty: 1 | Refills: 0 | Status: ACTIVE | COMMUNITY
Start: 2017-10-02 | End: 1900-01-01

## 2020-07-29 NOTE — HISTORY OF PRESENT ILLNESS
[FreeTextEntry1] : \par 7/29/2020: Patient presents today following up on hoarseness. Was given Nystatin at last visit in December and had improvement after that. She began having severe hoarse voice with intermittent voice loss 3 days ago.  Also cannot swallow solids still, no choking on liquids though. .  [de-identified] : 12/17/19 Patient is following up for left epistaxis, and voice loss. patient has been getting nose bleeds primarily in the left nostril for the last 2 weeks. last episode was this morning. She is on Xarelto. \par also her voice loss, and hoarseness has worsened. S/P multiple surgeries. \par her breathing \par Her lupus is flaring up, she is not on medication at this time due to kidney insufficiency. \par Also waiting for a new CT chest regarding her chest nodule.

## 2020-09-01 ENCOUNTER — APPOINTMENT (OUTPATIENT)
Dept: CARDIOLOGY | Facility: CLINIC | Age: 50
End: 2020-09-01
Payer: MEDICARE

## 2020-09-01 PROCEDURE — 93295 DEV INTERROG REMOTE 1/2/MLT: CPT

## 2020-09-01 PROCEDURE — 93296 REM INTERROG EVL PM/IDS: CPT

## 2020-09-14 ENCOUNTER — APPOINTMENT (OUTPATIENT)
Dept: OTOLARYNGOLOGY | Facility: CLINIC | Age: 50
End: 2020-09-14

## 2020-12-01 ENCOUNTER — APPOINTMENT (OUTPATIENT)
Dept: CARDIOLOGY | Facility: CLINIC | Age: 50
End: 2020-12-01
Payer: MEDICARE

## 2020-12-01 PROCEDURE — 93295 DEV INTERROG REMOTE 1/2/MLT: CPT

## 2020-12-01 PROCEDURE — 93296 REM INTERROG EVL PM/IDS: CPT

## 2020-12-07 RX ORDER — FLUCONAZOLE 200 MG/1
200 TABLET ORAL DAILY
Qty: 10 | Refills: 0 | Status: ACTIVE | COMMUNITY
Start: 2018-03-01 | End: 1900-01-01

## 2020-12-10 ENCOUNTER — EMERGENCY (EMERGENCY)
Facility: HOSPITAL | Age: 50
LOS: 0 days | Discharge: HOME | End: 2020-12-11
Admitting: STUDENT IN AN ORGANIZED HEALTH CARE EDUCATION/TRAINING PROGRAM
Payer: MEDICARE

## 2020-12-10 VITALS
HEART RATE: 66 BPM | HEIGHT: 63 IN | DIASTOLIC BLOOD PRESSURE: 97 MMHG | TEMPERATURE: 98 F | OXYGEN SATURATION: 97 % | SYSTOLIC BLOOD PRESSURE: 177 MMHG | RESPIRATION RATE: 18 BRPM | WEIGHT: 175.05 LBS

## 2020-12-10 DIAGNOSIS — M79.89 OTHER SPECIFIED SOFT TISSUE DISORDERS: ICD-10-CM

## 2020-12-10 DIAGNOSIS — Z98.84 BARIATRIC SURGERY STATUS: ICD-10-CM

## 2020-12-10 DIAGNOSIS — Z90.49 ACQUIRED ABSENCE OF OTHER SPECIFIED PARTS OF DIGESTIVE TRACT: Chronic | ICD-10-CM

## 2020-12-10 DIAGNOSIS — Z88.8 ALLERGY STATUS TO OTHER DRUGS, MEDICAMENTS AND BIOLOGICAL SUBSTANCES STATUS: ICD-10-CM

## 2020-12-10 DIAGNOSIS — Z87.891 PERSONAL HISTORY OF NICOTINE DEPENDENCE: ICD-10-CM

## 2020-12-10 DIAGNOSIS — Z85.21 PERSONAL HISTORY OF MALIGNANT NEOPLASM OF LARYNX: Chronic | ICD-10-CM

## 2020-12-10 LAB
ALBUMIN SERPL ELPH-MCNC: 4.3 G/DL — SIGNIFICANT CHANGE UP (ref 3.5–5.2)
ALP SERPL-CCNC: 68 U/L — SIGNIFICANT CHANGE UP (ref 30–115)
ALT FLD-CCNC: 9 U/L — SIGNIFICANT CHANGE UP (ref 0–41)
ANION GAP SERPL CALC-SCNC: 10 MMOL/L — SIGNIFICANT CHANGE UP (ref 7–14)
AST SERPL-CCNC: 14 U/L — SIGNIFICANT CHANGE UP (ref 0–41)
BASOPHILS # BLD AUTO: 0.03 K/UL — SIGNIFICANT CHANGE UP (ref 0–0.2)
BASOPHILS NFR BLD AUTO: 0.4 % — SIGNIFICANT CHANGE UP (ref 0–1)
BILIRUB SERPL-MCNC: 0.4 MG/DL — SIGNIFICANT CHANGE UP (ref 0.2–1.2)
BUN SERPL-MCNC: 10 MG/DL — SIGNIFICANT CHANGE UP (ref 10–20)
CALCIUM SERPL-MCNC: 9.3 MG/DL — SIGNIFICANT CHANGE UP (ref 8.5–10.1)
CHLORIDE SERPL-SCNC: 101 MMOL/L — SIGNIFICANT CHANGE UP (ref 98–110)
CO2 SERPL-SCNC: 29 MMOL/L — SIGNIFICANT CHANGE UP (ref 17–32)
CREAT SERPL-MCNC: 0.9 MG/DL — SIGNIFICANT CHANGE UP (ref 0.7–1.5)
EOSINOPHIL # BLD AUTO: 0.16 K/UL — SIGNIFICANT CHANGE UP (ref 0–0.7)
EOSINOPHIL NFR BLD AUTO: 1.9 % — SIGNIFICANT CHANGE UP (ref 0–8)
GLUCOSE SERPL-MCNC: 92 MG/DL — SIGNIFICANT CHANGE UP (ref 70–99)
HCT VFR BLD CALC: 42.9 % — SIGNIFICANT CHANGE UP (ref 37–47)
HGB BLD-MCNC: 14 G/DL — SIGNIFICANT CHANGE UP (ref 12–16)
IMM GRANULOCYTES NFR BLD AUTO: 0.2 % — SIGNIFICANT CHANGE UP (ref 0.1–0.3)
LYMPHOCYTES # BLD AUTO: 2.67 K/UL — SIGNIFICANT CHANGE UP (ref 1.2–3.4)
LYMPHOCYTES # BLD AUTO: 31.3 % — SIGNIFICANT CHANGE UP (ref 20.5–51.1)
MCHC RBC-ENTMCNC: 29 PG — SIGNIFICANT CHANGE UP (ref 27–31)
MCHC RBC-ENTMCNC: 32.6 G/DL — SIGNIFICANT CHANGE UP (ref 32–37)
MCV RBC AUTO: 89 FL — SIGNIFICANT CHANGE UP (ref 81–99)
MONOCYTES # BLD AUTO: 0.67 K/UL — HIGH (ref 0.1–0.6)
MONOCYTES NFR BLD AUTO: 7.9 % — SIGNIFICANT CHANGE UP (ref 1.7–9.3)
NEUTROPHILS # BLD AUTO: 4.98 K/UL — SIGNIFICANT CHANGE UP (ref 1.4–6.5)
NEUTROPHILS NFR BLD AUTO: 58.3 % — SIGNIFICANT CHANGE UP (ref 42.2–75.2)
NRBC # BLD: 0 /100 WBCS — SIGNIFICANT CHANGE UP (ref 0–0)
PLATELET # BLD AUTO: 164 K/UL — SIGNIFICANT CHANGE UP (ref 130–400)
POTASSIUM SERPL-MCNC: 3.5 MMOL/L — SIGNIFICANT CHANGE UP (ref 3.5–5)
POTASSIUM SERPL-SCNC: 3.5 MMOL/L — SIGNIFICANT CHANGE UP (ref 3.5–5)
PROT SERPL-MCNC: 7 G/DL — SIGNIFICANT CHANGE UP (ref 6–8)
RBC # BLD: 4.82 M/UL — SIGNIFICANT CHANGE UP (ref 4.2–5.4)
RBC # FLD: 12.9 % — SIGNIFICANT CHANGE UP (ref 11.5–14.5)
SODIUM SERPL-SCNC: 140 MMOL/L — SIGNIFICANT CHANGE UP (ref 135–146)
WBC # BLD: 8.53 K/UL — SIGNIFICANT CHANGE UP (ref 4.8–10.8)
WBC # FLD AUTO: 8.53 K/UL — SIGNIFICANT CHANGE UP (ref 4.8–10.8)

## 2020-12-10 PROCEDURE — 76937 US GUIDE VASCULAR ACCESS: CPT

## 2020-12-10 PROCEDURE — 36000 PLACE NEEDLE IN VEIN: CPT | Mod: GC

## 2020-12-10 PROCEDURE — 93971 EXTREMITY STUDY: CPT | Mod: 26

## 2020-12-10 PROCEDURE — 99284 EMERGENCY DEPT VISIT MOD MDM: CPT | Mod: 25,GC

## 2020-12-10 PROCEDURE — 93931 UPPER EXTREMITY STUDY: CPT | Mod: 26,LT

## 2020-12-10 RX ORDER — DEXAMETHASONE 0.5 MG/5ML
8 ELIXIR ORAL ONCE
Refills: 0 | Status: COMPLETED | OUTPATIENT
Start: 2020-12-10 | End: 2020-12-10

## 2020-12-10 RX ADMIN — Medication 8 MILLIGRAM(S): at 22:06

## 2020-12-10 NOTE — ED PROVIDER NOTE - PROVIDER TOKENS
PROVIDER:[TOKEN:[[id number]],FOLLOWUP:[1-3 Days]],PROVIDER:[TOKEN:[[id number]],FOLLOWUP:[1-3 Days]]

## 2020-12-10 NOTE — ED PROVIDER NOTE - CLINICAL SUMMARY MEDICAL DECISION MAKING FREE TEXT BOX
Pt was sent in from doctor's office for evaluation of worsened LE swelling. Required IV, labs and imaging. No acute findings. Will d.c with outpt f/up.

## 2020-12-10 NOTE — ED PROVIDER NOTE - PHYSICAL EXAMINATION
CONSTITUTIONAL: Well-developed; well-nourished; in no acute distress. Laying on bed comfortably.  SKIN: warm, dry  HEAD: Normocephalic; atraumatic.  EYES: no conjunctival injection. PERRL.   ENT: No nasal discharge; airway clear.  NECK: Supple; non tender.  CARD: S1, S2 normal; no murmurs, gallops, or rubs. Regular rate and rhythm.   RESP: No wheezes, rales or rhonchi.  ABD: soft ntnd  EXT: Normal ROM.  No clubbing, cyanosis. Left forearm and left hand mild edema, pain on palpation of left hand dorsum. 2 + radial pulses b/l with normal cap refill.  LYMPH: No acute cervical adenopathy.  NEURO: Alert, oriented, grossly unremarkable  PSYCH: Cooperative, appropriate.

## 2020-12-10 NOTE — ED PROVIDER NOTE - OBJECTIVE STATEMENT
Patient is a 49 yo female with PMHx of lupus on xarelto, DVT, PE, COPD, CHF s/o AICD/pacemaker, HTN, RA, throat cancer c/o left arm swelling for past couple of days. Patient was given augmentin for recent pneumonia 1 week ago, with improvement in cough. Left arm and left hand has increased swelling for past couple of days, seen by Dr. Eaton, her EP doctor today, told her to go to ED to r/o left arm DVT or clot in pacemaker. Denies fever, chills, chest pain, SOB, abdominal pain, n/v/d.

## 2020-12-10 NOTE — ED PROVIDER NOTE - CARE PROVIDER_API CALL
Jason Eaton; PADMINI)  [facility]  [address] [place] [address]  Phone: [phone]  Fax: [phone]  Follow Up Time: 1-3 Days    [place] [address]  Phone: [phone]  Fax: [phone]  Follow Up Time: 1-3 Days

## 2020-12-10 NOTE — ED PROVIDER NOTE - PATIENT PORTAL LINK FT
You can access the FollowMyHealth Patient Portal offered by [facility] by registering at the following website: [email or web address]. By joining [employer]’s FollowMyHealth portal, you will also be able to view your health information using other applications (apps) compatible with our system.

## 2020-12-10 NOTE — ED ADULT NURSE REASSESSMENT NOTE - NS ED NURSE REASSESS COMMENT FT1
Patient states feels like her left side of face is swelling. Dr. Reyes ext [phone] made aware and will come to see patient.

## 2020-12-10 NOTE — ED PROVIDER NOTE - PROGRESS NOTE DETAILS
MQ: Called EP Dr. Eaton's office because sent patient in, awaiting call back MQ: Talked to Dr. Magana from EP office, wants her to receive doppler of vein and artery due to hx of clotting and left swollen arm, will order doppler MQ: Doppler shows no clots in left arm venous or arterial, called back EP Dr. Magana and awaiting call back MQ: Patient's face increased swelling, felt throat closing, exam showed airway intact, given decadron MANDO: Received sign out from Dr. Fischer. Will f/u imaging and reassess. Misael: Sign out received from Dr. Reyes JR: pt endorsed to Dr. Douglas: plan f/u CT angio to r/o thrombus

## 2020-12-10 NOTE — ED ADULT NURSE REASSESSMENT NOTE - NS ED NURSE REASSESS COMMENT FT1
Note intended for 1930: Received patient from previous RN, patient is A&OX4 in NAD here for left arm swelling. Patient denies any sob or chest pain. Patient awaiting dispo and results.

## 2020-12-10 NOTE — ED PROVIDER NOTE - ATTENDING CONTRIBUTION TO CARE
50F with PMH lupus, CAD, AICD, recently completed treatment for PNA s/p Augmentin 1 week ago, who presents from Dr. Eaton (cardiology) for worsening LUE swelling for the past few days. Denies She reports cough is overall much better, but still has green/yellow sputum. Denies fevers, chills. Dr. Eaton requested duplex venous and arterial US LUE.     Gen - NAD, Head - NCAT, TMs - clear b/l, Pharynx - clear, MMM, Heart - RRR, no m/g/r, Lungs - CTAB, no w/c/r, Abdomen - soft, NT, ND, Skin - No rash, Extremities - FROM, + non-pitting edema to LLE, no erythema, ecchymosis, 2+ radial pulse. Neuro - CN 2-12 intact, nl strength and sensation, nl gait.    P: spoke to Dr. Eaton's service. Will obtain duplex venous/arterial US LUE and reassess. 50F with PMH lupus, CAD, AICD, recently completed treatment for PNA s/p Augmentin 1 week ago, who presents from Dr. Eaton (cardiology) for worsening LUE swelling for the past few days. Denies She reports cough is overall much better, but still has green/yellow sputum. Denies fevers, chills. Dr. Eaton requested duplex venous and arterial US LUE.     Gen - NAD, Head - NCAT, TMs - clear b/l, Pharynx - clear, MMM, Heart - RRR, no m/g/r, Lungs - CTAB, no w/c/r, Abdomen - soft, NT, ND, Skin - No rash, Extremities - FROM, +1 non-pitting edema to LUE, no erythema, ecchymosis, 2+ radial pulse. Neuro - CN 2-12 intact, nl strength and sensation, nl gait.    P: spoke to Dr. Eaton's service. Will obtain duplex venous/arterial US LUE and reassess. 50F with PMH lupus, remote Hx DVT/PE on Xarelto, throat cancer, CAD, AICD, recently completed treatment for PNA s/p Augmentin 1 week ago, who presents from Dr. Eaton (cardiology) for worsening LUE swelling for the past few days. Denies She reports cough is overall much better, but still has green/yellow sputum. Denies fevers, chills. Dr. Eaton requested duplex venous and arterial US LUE.     Gen - NAD, Head - NCAT, TMs - clear b/l, Pharynx - clear, MMM, Heart - RRR, no m/g/r, Lungs - CTAB, no w/c/r, Abdomen - soft, NT, ND, Skin - No rash, Extremities - FROM, +1 non-pitting edema to LUE, no erythema, ecchymosis, 2+ radial pulse. Neuro - CN 2-12 intact, nl strength and sensation, nl gait.    P: spoke to Dr. Eaton's service. Will obtain duplex venous/arterial US LUE and reassess.

## 2020-12-10 NOTE — ED ADULT TRIAGE NOTE - CHIEF COMPLAINT QUOTE
Pt sent in from EP doctor office for left arm swelling, pt primary worried about a clot being present. Pt has a hx of Lupus currently on xarleto for clots. Pt sent in from EP doctor office for left arm swelling, pt primary worried about a clot being present. Pt has a hx of Lupus currently on xarleto for clots. Denies SOB, o2 saturation 97% on room air. Pt sent in from EP doctor office for left arm swelling, pt primary worried about a clot being present. Pt has a hx of Lupus currently on xarleto for clots. Denies SOB, o2 saturation 97% on room air. Contact info for  [phone]

## 2020-12-10 NOTE — ED PROVIDER NOTE - NS ED ROS FT
Review of Systems:  •	CONSTITUTIONAL - No fever, No diaphoresis, No weight change  •	SKIN - No rash  •	HEMATOLOGIC - No abnormal bleeding or bruising  •	EYES - No eye pain, No blurred vision  •	ENT - No change in hearing, No sore throat, No neck pain, No rhinorrhea, No ear pain  •	RESPIRATORY - No shortness of breath, No cough  •	CARDIAC -No chest pain, No palpitations  •	GI - No abdominal pain, No nausea, No vomiting, No diarrhea, No constipation, No bright red blood per rectum or melena. No flank pain  •                 - No dysuria, frequency, hematuria.   •	ENDO - No polydypsia, No polyuria, No heat/cold intolerance  •	MUSCULOSKELETAL - No joint paint, No swelling, No back pain. + Left arm and left hand pain and swelling  •	NEUROLOGIC - No numbness, No focal weakness, No headache, No dizziness  All other systems negative, unless specified in HPI

## 2020-12-10 NOTE — ED ADULT NURSE NOTE - CHIEF COMPLAINT QUOTE
Pt sent in from EP doctor office for left arm swelling, pt primary worried about a clot being present. Pt has a hx of Lupus currently on xarleto for clots. Denies SOB, o2 saturation 97% on room air. Contact info for  [phone]

## 2020-12-10 NOTE — ED PROVIDER NOTE - CARE PROVIDERS DIRECT ADDRESSES
[email or web address]

## 2020-12-11 VITALS
OXYGEN SATURATION: 95 % | HEART RATE: 60 BPM | DIASTOLIC BLOOD PRESSURE: 64 MMHG | RESPIRATION RATE: 18 BRPM | TEMPERATURE: 99 F | SYSTOLIC BLOOD PRESSURE: 136 MMHG

## 2020-12-11 PROCEDURE — 71275 CT ANGIOGRAPHY CHEST: CPT | Mod: 26

## 2020-12-23 PROBLEM — Z87.09 HISTORY OF SORE THROAT: Status: RESOLVED | Noted: 2017-10-02 | Resolved: 2020-12-23

## 2021-02-08 ENCOUNTER — APPOINTMENT (OUTPATIENT)
Dept: VASCULAR SURGERY | Facility: CLINIC | Age: 51
End: 2021-02-08
Payer: MEDICARE

## 2021-02-08 VITALS — DIASTOLIC BLOOD PRESSURE: 82 MMHG | SYSTOLIC BLOOD PRESSURE: 132 MMHG | WEIGHT: 170 LBS | BODY MASS INDEX: 30.11 KG/M2

## 2021-02-08 DIAGNOSIS — Z85.21 PERSONAL HISTORY OF MALIGNANT NEOPLASM OF LARYNX: ICD-10-CM

## 2021-02-08 PROCEDURE — 99203 OFFICE O/P NEW LOW 30 MIN: CPT

## 2021-02-08 PROCEDURE — 93971 EXTREMITY STUDY: CPT

## 2021-02-08 RX ORDER — MORPHINE SULFATE 30 MG/1
30 TABLET, FILM COATED, EXTENDED RELEASE ORAL
Qty: 30 | Refills: 0 | Status: ACTIVE | COMMUNITY
Start: 2021-01-13

## 2021-02-08 RX ORDER — FLUTICASONE FUROATE, UMECLIDINIUM BROMIDE AND VILANTEROL TRIFENATATE 100; 62.5; 25 UG/1; UG/1; UG/1
100-62.5-25 POWDER RESPIRATORY (INHALATION)
Qty: 60 | Refills: 0 | Status: ACTIVE | COMMUNITY
Start: 2019-09-25

## 2021-02-08 RX ORDER — BACLOFEN 20 MG/1
20 TABLET ORAL
Qty: 90 | Refills: 0 | Status: ACTIVE | COMMUNITY
Start: 2020-12-09

## 2021-02-08 RX ORDER — CYANOCOBALAMIN 1000 UG/ML
1000 INJECTION INTRAMUSCULAR; SUBCUTANEOUS
Qty: 10 | Refills: 0 | Status: ACTIVE | COMMUNITY
Start: 2020-12-15

## 2021-02-08 RX ORDER — AMOXICILLIN AND CLAVULANATE POTASSIUM 875; 125 MG/1; MG/1
875-125 TABLET, COATED ORAL
Qty: 14 | Refills: 0 | Status: ACTIVE | COMMUNITY
Start: 2020-09-21

## 2021-02-08 RX ORDER — PREDNISONE 20 MG/1
20 TABLET ORAL
Qty: 18 | Refills: 0 | Status: ACTIVE | COMMUNITY
Start: 2020-11-16

## 2021-02-08 RX ORDER — METOPROLOL SUCCINATE 100 MG/1
100 TABLET, EXTENDED RELEASE ORAL
Qty: 90 | Refills: 0 | Status: ACTIVE | COMMUNITY
Start: 2021-01-15

## 2021-02-08 RX ORDER — FLUOXETINE HYDROCHLORIDE 20 MG/1
20 CAPSULE ORAL
Qty: 30 | Refills: 0 | Status: ACTIVE | COMMUNITY
Start: 2020-06-04

## 2021-02-08 RX ORDER — ALPRAZOLAM 1 MG/1
1 TABLET ORAL
Qty: 180 | Refills: 0 | Status: ACTIVE | COMMUNITY
Start: 2021-02-01

## 2021-02-08 RX ORDER — FUROSEMIDE 20 MG/1
20 TABLET ORAL
Qty: 60 | Refills: 0 | Status: ACTIVE | COMMUNITY
Start: 2020-12-11

## 2021-02-08 RX ORDER — ZOLPIDEM TARTRATE 10 MG/1
10 TABLET ORAL
Qty: 30 | Refills: 0 | Status: ACTIVE | COMMUNITY
Start: 2021-01-25

## 2021-02-08 RX ORDER — DOXYCYCLINE HYCLATE 100 MG/1
100 CAPSULE ORAL
Qty: 14 | Refills: 0 | Status: ACTIVE | COMMUNITY
Start: 2020-11-16

## 2021-02-08 RX ORDER — ATORVASTATIN CALCIUM 10 MG/1
10 TABLET, FILM COATED ORAL
Qty: 90 | Refills: 0 | Status: ACTIVE | COMMUNITY
Start: 2020-08-24

## 2021-02-08 RX ORDER — MORPHINE SULFATE 60 MG/1
60 TABLET, FILM COATED, EXTENDED RELEASE ORAL
Qty: 180 | Refills: 0 | Status: ACTIVE | COMMUNITY
Start: 2021-01-13

## 2021-02-08 RX ORDER — BELIMUMAB 200 MG/ML
200 SOLUTION SUBCUTANEOUS
Qty: 4 | Refills: 0 | Status: ACTIVE | COMMUNITY
Start: 2021-01-20

## 2021-02-08 NOTE — DATA REVIEWED
[FreeTextEntry1] : I performed a venous duplex which was medically necessary to evaluate for DVT or venous stenosis. It showed chronic changes in the left subclavian vein with minimal recanalization.\par

## 2021-02-08 NOTE — CONSULT LETTER
[Dear  ___] : Dear  [unfilled], [Consult Letter:] : I had the pleasure of evaluating your patient, [unfilled]. [Please see my note below.] : Please see my note below. [FreeTextEntry2] : Dear Dr. Jason Eaton,\par Dear Dr. Musa Magana,

## 2021-02-08 NOTE — ASSESSMENT
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 I will also discuss this with EP - Dr. Eaton. She is scheduled for left upper extremity venogram on 3/2/2021. She should hold Xarelto the day before the procedure.\par \par I am prescribing her compression sleeve / gauntlet to improve the left upper extremity swelling, to be worn daily during the day.

## 2021-02-08 NOTE — HISTORY OF PRESENT ILLNESS
[FreeTextEntry1] : 51 y/o female with h/o laryngeal cancer, s/p chemotherapy and radiation treatment, h/o pacemaker placement initially in 2003, underwent leads replaced 10 years ago, h/o LLE DVT and PE, h/o Lupus anticoagulant on lifelong Xarelto, presents for evaluation of left arm pain and swelling, intermittently since December 2020. She was seen in ED in December 2020 and underwent a CT scan of chest, and upper extremity venous and arterial duplexes that I reviewed.

## 2021-02-17 ENCOUNTER — RESULT REVIEW (OUTPATIENT)
Age: 51
End: 2021-02-17

## 2021-02-17 ENCOUNTER — OUTPATIENT (OUTPATIENT)
Dept: OUTPATIENT SERVICES | Facility: HOSPITAL | Age: 51
LOS: 1 days | Discharge: HOME | End: 2021-02-17
Payer: MEDICARE

## 2021-02-17 VITALS
HEIGHT: 63 IN | SYSTOLIC BLOOD PRESSURE: 157 MMHG | DIASTOLIC BLOOD PRESSURE: 76 MMHG | TEMPERATURE: 98 F | HEART RATE: 78 BPM | OXYGEN SATURATION: 99 % | WEIGHT: 167.99 LBS | RESPIRATION RATE: 16 BRPM

## 2021-02-17 DIAGNOSIS — Z98.890 OTHER SPECIFIED POSTPROCEDURAL STATES: Chronic | ICD-10-CM

## 2021-02-17 DIAGNOSIS — Z90.49 ACQUIRED ABSENCE OF OTHER SPECIFIED PARTS OF DIGESTIVE TRACT: Chronic | ICD-10-CM

## 2021-02-17 DIAGNOSIS — M79.89 OTHER SPECIFIED SOFT TISSUE DISORDERS: ICD-10-CM

## 2021-02-17 DIAGNOSIS — Z01.818 ENCOUNTER FOR OTHER PREPROCEDURAL EXAMINATION: ICD-10-CM

## 2021-02-17 DIAGNOSIS — Z90.710 ACQUIRED ABSENCE OF BOTH CERVIX AND UTERUS: Chronic | ICD-10-CM

## 2021-02-17 DIAGNOSIS — Z95.810 PRESENCE OF AUTOMATIC (IMPLANTABLE) CARDIAC DEFIBRILLATOR: Chronic | ICD-10-CM

## 2021-02-17 DIAGNOSIS — Z85.21 PERSONAL HISTORY OF MALIGNANT NEOPLASM OF LARYNX: Chronic | ICD-10-CM

## 2021-02-17 LAB
ALBUMIN SERPL ELPH-MCNC: 4.8 G/DL — SIGNIFICANT CHANGE UP (ref 3.5–5.2)
ALP SERPL-CCNC: 70 U/L — SIGNIFICANT CHANGE UP (ref 30–115)
ALT FLD-CCNC: 9 U/L — SIGNIFICANT CHANGE UP (ref 0–41)
ANION GAP SERPL CALC-SCNC: 12 MMOL/L — SIGNIFICANT CHANGE UP (ref 7–14)
APTT BLD: 64.8 SEC — HIGH (ref 27–39.2)
AST SERPL-CCNC: 17 U/L — SIGNIFICANT CHANGE UP (ref 0–41)
BASOPHILS # BLD AUTO: 0.04 K/UL — SIGNIFICANT CHANGE UP (ref 0–0.2)
BASOPHILS NFR BLD AUTO: 0.5 % — SIGNIFICANT CHANGE UP (ref 0–1)
BILIRUB SERPL-MCNC: 0.4 MG/DL — SIGNIFICANT CHANGE UP (ref 0.2–1.2)
BUN SERPL-MCNC: 16 MG/DL — SIGNIFICANT CHANGE UP (ref 10–20)
CALCIUM SERPL-MCNC: 10.4 MG/DL — HIGH (ref 8.5–10.1)
CHLORIDE SERPL-SCNC: 99 MMOL/L — SIGNIFICANT CHANGE UP (ref 98–110)
CO2 SERPL-SCNC: 29 MMOL/L — SIGNIFICANT CHANGE UP (ref 17–32)
CREAT SERPL-MCNC: 1 MG/DL — SIGNIFICANT CHANGE UP (ref 0.7–1.5)
EOSINOPHIL # BLD AUTO: 0.36 K/UL — SIGNIFICANT CHANGE UP (ref 0–0.7)
EOSINOPHIL NFR BLD AUTO: 4.5 % — SIGNIFICANT CHANGE UP (ref 0–8)
GLUCOSE SERPL-MCNC: 99 MG/DL — SIGNIFICANT CHANGE UP (ref 70–99)
HCT VFR BLD CALC: 45.5 % — SIGNIFICANT CHANGE UP (ref 37–47)
HGB BLD-MCNC: 15.1 G/DL — SIGNIFICANT CHANGE UP (ref 12–16)
IMM GRANULOCYTES NFR BLD AUTO: 0.4 % — HIGH (ref 0.1–0.3)
INR BLD: 1.08 RATIO — SIGNIFICANT CHANGE UP (ref 0.65–1.3)
LYMPHOCYTES # BLD AUTO: 2.48 K/UL — SIGNIFICANT CHANGE UP (ref 1.2–3.4)
LYMPHOCYTES # BLD AUTO: 31 % — SIGNIFICANT CHANGE UP (ref 20.5–51.1)
MCHC RBC-ENTMCNC: 29.1 PG — SIGNIFICANT CHANGE UP (ref 27–31)
MCHC RBC-ENTMCNC: 33.2 G/DL — SIGNIFICANT CHANGE UP (ref 32–37)
MCV RBC AUTO: 87.7 FL — SIGNIFICANT CHANGE UP (ref 81–99)
MONOCYTES # BLD AUTO: 0.68 K/UL — HIGH (ref 0.1–0.6)
MONOCYTES NFR BLD AUTO: 8.5 % — SIGNIFICANT CHANGE UP (ref 1.7–9.3)
NEUTROPHILS # BLD AUTO: 4.42 K/UL — SIGNIFICANT CHANGE UP (ref 1.4–6.5)
NEUTROPHILS NFR BLD AUTO: 55.1 % — SIGNIFICANT CHANGE UP (ref 42.2–75.2)
NRBC # BLD: 0 /100 WBCS — SIGNIFICANT CHANGE UP (ref 0–0)
PLATELET # BLD AUTO: 208 K/UL — SIGNIFICANT CHANGE UP (ref 130–400)
POTASSIUM SERPL-MCNC: 4.4 MMOL/L — SIGNIFICANT CHANGE UP (ref 3.5–5)
POTASSIUM SERPL-SCNC: 4.4 MMOL/L — SIGNIFICANT CHANGE UP (ref 3.5–5)
PROT SERPL-MCNC: 7.5 G/DL — SIGNIFICANT CHANGE UP (ref 6–8)
PROTHROM AB SERPL-ACNC: 12.4 SEC — SIGNIFICANT CHANGE UP (ref 9.95–12.87)
RBC # BLD: 5.19 M/UL — SIGNIFICANT CHANGE UP (ref 4.2–5.4)
RBC # FLD: 13.1 % — SIGNIFICANT CHANGE UP (ref 11.5–14.5)
SODIUM SERPL-SCNC: 140 MMOL/L — SIGNIFICANT CHANGE UP (ref 135–146)
WBC # BLD: 8.01 K/UL — SIGNIFICANT CHANGE UP (ref 4.8–10.8)
WBC # FLD AUTO: 8.01 K/UL — SIGNIFICANT CHANGE UP (ref 4.8–10.8)

## 2021-02-17 PROCEDURE — 71046 X-RAY EXAM CHEST 2 VIEWS: CPT | Mod: 26

## 2021-02-17 PROCEDURE — 72050 X-RAY EXAM NECK SPINE 4/5VWS: CPT | Mod: 26

## 2021-02-17 PROCEDURE — 93010 ELECTROCARDIOGRAM REPORT: CPT

## 2021-02-17 RX ORDER — ZINC SULFATE TAB 220 MG (50 MG ZINC EQUIVALENT) 220 (50 ZN) MG
1 TAB ORAL
Qty: 0 | Refills: 0 | DISCHARGE

## 2021-02-17 RX ORDER — METOCLOPRAMIDE HCL 10 MG
1 TABLET ORAL
Qty: 0 | Refills: 0 | DISCHARGE

## 2021-02-17 RX ORDER — CYCLOBENZAPRINE HYDROCHLORIDE 10 MG/1
1 TABLET, FILM COATED ORAL
Qty: 0 | Refills: 0 | DISCHARGE

## 2021-02-17 RX ORDER — HYDROMORPHONE HYDROCHLORIDE 2 MG/ML
1 INJECTION INTRAMUSCULAR; INTRAVENOUS; SUBCUTANEOUS
Qty: 0 | Refills: 0 | DISCHARGE

## 2021-02-17 RX ORDER — ALBUTEROL 90 UG/1
2 AEROSOL, METERED ORAL
Qty: 0 | Refills: 0 | DISCHARGE

## 2021-02-17 RX ORDER — FLUTICASONE FUROATE AND VILANTEROL TRIFENATATE 100; 25 UG/1; UG/1
1 POWDER RESPIRATORY (INHALATION)
Qty: 0 | Refills: 0 | DISCHARGE

## 2021-02-17 RX ORDER — ESOMEPRAZOLE MAGNESIUM 40 MG/1
1 CAPSULE, DELAYED RELEASE ORAL
Qty: 0 | Refills: 0 | DISCHARGE

## 2021-02-17 RX ORDER — ALPRAZOLAM 0.25 MG
2 TABLET ORAL
Qty: 0 | Refills: 0 | DISCHARGE

## 2021-02-17 NOTE — H&P PST ADULT - REASON FOR ADMISSION
49 yo female presents for PAST in preparation for left upper extremity venogram possible endovascular revascularization on 3/2/2021 under local sedation anesthesia by Dr. Guan (OR Bhavesh)

## 2021-02-17 NOTE — H&P PST ADULT - NSICDXPASTMEDICALHX_GEN_ALL_CORE_FT
PAST MEDICAL HISTORY:  CHF (congestive heart failure)     COPD (chronic obstructive pulmonary disease)     DVT (deep venous thrombosis)     History of laryngeal cancer     HTN (hypertension)     Lupus     Pulmonary embolism     RA (rheumatoid arthritis)

## 2021-02-17 NOTE — H&P PST ADULT - OTHER CARE PROVIDERS
Dr. Magana (cardiac) LV 12/2020 Dr. Dumont (pulmonary) LV summer 2020 Dr. Hernandez (renal) LV 2/2020 Dr. Eaton (EPS) LV 12/2020

## 2021-02-17 NOTE — H&P PST ADULT - HISTORY OF PRESENT ILLNESS
Pt states since having ICD in place she was experiencing pain in her arm, swelling and discoloration in left upper extremity. After further testing it was noted that pt had possible arterial occlusion causing symptoms. Pt now for listed procedure. Denies any chest pain, difficulty breathing, SOB, palpitations, dysuria, URI, or any other infections in the last 2 weeks. Denies any recent travel, contact, or exposure to any persons with known or suspected COVID-19. Pt also denies COVID testing within the last 2 weeks. Denies any suicidal or homicidal ideations. Pt advised to self quarantine until day of procedure. Poor exercise tolerance as pt is unable to climb a flight of stairs without dyspnea. LUCY reviewed with patient. Pt verbalized understanding of all pre-operative instructions.    Anesthesia Alert  YES--Difficult Airway: Class IV  NO--History of neck surgery or radiation  NO--Limited ROM of neck  NO--History of Malignant hyperthermia  NO--No personal or family history of Pseudocholinesterase deficiency.  YES--Prior Anesthesia Complication: PONV  NO--Latex Allergy  NO--Loose teeth  YES--History of Rheumatoid Arthritis  NO--LUCY  YES--Other: AICD in place & daily eye drops

## 2021-02-17 NOTE — H&P PST ADULT - NSICDXPASTSURGICALHX_GEN_ALL_CORE_FT
PAST SURGICAL HISTORY:  AICD (automatic cardioverter/defibrillator) present     H/O foot surgery     History of back surgery     S/P appendectomy     S/P cholecystectomy     S/P eye surgery     S/P hysterectomy

## 2021-02-27 ENCOUNTER — LABORATORY RESULT (OUTPATIENT)
Age: 51
End: 2021-02-27

## 2021-02-27 ENCOUNTER — OUTPATIENT (OUTPATIENT)
Dept: OUTPATIENT SERVICES | Facility: HOSPITAL | Age: 51
LOS: 1 days | Discharge: HOME | End: 2021-02-27

## 2021-02-27 DIAGNOSIS — Z90.49 ACQUIRED ABSENCE OF OTHER SPECIFIED PARTS OF DIGESTIVE TRACT: Chronic | ICD-10-CM

## 2021-02-27 DIAGNOSIS — Z98.890 OTHER SPECIFIED POSTPROCEDURAL STATES: Chronic | ICD-10-CM

## 2021-02-27 DIAGNOSIS — Z11.59 ENCOUNTER FOR SCREENING FOR OTHER VIRAL DISEASES: ICD-10-CM

## 2021-02-27 DIAGNOSIS — Z90.710 ACQUIRED ABSENCE OF BOTH CERVIX AND UTERUS: Chronic | ICD-10-CM

## 2021-02-27 DIAGNOSIS — Z95.810 PRESENCE OF AUTOMATIC (IMPLANTABLE) CARDIAC DEFIBRILLATOR: Chronic | ICD-10-CM

## 2021-02-27 PROBLEM — Z85.21 PERSONAL HISTORY OF MALIGNANT NEOPLASM OF LARYNX: Chronic | Status: ACTIVE | Noted: 2021-02-17

## 2021-02-27 PROBLEM — I26.99 OTHER PULMONARY EMBOLISM WITHOUT ACUTE COR PULMONALE: Chronic | Status: ACTIVE | Noted: 2021-02-17

## 2021-03-02 ENCOUNTER — NON-APPOINTMENT (OUTPATIENT)
Age: 51
End: 2021-03-02

## 2021-03-02 ENCOUNTER — APPOINTMENT (OUTPATIENT)
Dept: VASCULAR SURGERY | Facility: HOSPITAL | Age: 51
End: 2021-03-02

## 2021-03-02 ENCOUNTER — OUTPATIENT (OUTPATIENT)
Dept: OUTPATIENT SERVICES | Facility: HOSPITAL | Age: 51
LOS: 1 days | Discharge: HOME | End: 2021-03-02
Payer: MEDICARE

## 2021-03-02 ENCOUNTER — APPOINTMENT (OUTPATIENT)
Dept: CARDIOLOGY | Facility: CLINIC | Age: 51
End: 2021-03-02
Payer: MEDICARE

## 2021-03-02 VITALS
TEMPERATURE: 98 F | DIASTOLIC BLOOD PRESSURE: 74 MMHG | WEIGHT: 169.09 LBS | HEART RATE: 95 BPM | HEIGHT: 63 IN | SYSTOLIC BLOOD PRESSURE: 106 MMHG | RESPIRATION RATE: 18 BRPM

## 2021-03-02 VITALS
HEART RATE: 73 BPM | SYSTOLIC BLOOD PRESSURE: 145 MMHG | RESPIRATION RATE: 20 BRPM | OXYGEN SATURATION: 96 % | DIASTOLIC BLOOD PRESSURE: 65 MMHG

## 2021-03-02 DIAGNOSIS — Z90.49 ACQUIRED ABSENCE OF OTHER SPECIFIED PARTS OF DIGESTIVE TRACT: Chronic | ICD-10-CM

## 2021-03-02 DIAGNOSIS — Z98.890 OTHER SPECIFIED POSTPROCEDURAL STATES: Chronic | ICD-10-CM

## 2021-03-02 DIAGNOSIS — Z95.810 PRESENCE OF AUTOMATIC (IMPLANTABLE) CARDIAC DEFIBRILLATOR: Chronic | ICD-10-CM

## 2021-03-02 DIAGNOSIS — Z90.710 ACQUIRED ABSENCE OF BOTH CERVIX AND UTERUS: Chronic | ICD-10-CM

## 2021-03-02 LAB — BLD GP AB SCN SERPL QL: SIGNIFICANT CHANGE UP

## 2021-03-02 PROCEDURE — 37248 TRLUML BALO ANGIOP 1ST VEIN: CPT

## 2021-03-02 PROCEDURE — 93296 REM INTERROG EVL PM/IDS: CPT

## 2021-03-02 PROCEDURE — 93295 DEV INTERROG REMOTE 1/2/MLT: CPT

## 2021-03-02 RX ORDER — MORPHINE SULFATE 50 MG/1
1 CAPSULE, EXTENDED RELEASE ORAL
Qty: 0 | Refills: 0 | DISCHARGE

## 2021-03-02 NOTE — CHART NOTE - NSCHARTNOTEFT_GEN_A_CORE
PACU ANESTHESIA ADMISSION NOTE      Procedure: Venogram extremity left      Post op diagnosis:  Left arm swelling        ____  Intubated  TV:______       Rate: ______      FiO2: ______    _x___  Patent Airway    _x___  Full return of protective reflexes    _x___  Full recovery from anesthesia / back to baseline status    Vitals:            T:    97.6            BP :    125/66            R:    15         Sat:   97%            P: 66      Mental Status:  _x___ Awake   _____ Alert   _____ Drowsy   _____ Sedated    Nausea/Vomiting:  _x___  NO       ______Yes,   See Post - Op Orders         Pain Scale (0-10):  __0___    Treatment: ___ None    __x__ See Post - Op/PCA Orders    Post - Operative Fluids:   __x__ Oral   ____ See Post - Op Orders    Plan: Discharge:   _x___Home       _____Floor     _____Critical Care    _____  Other:_________________    Comments:  No anesthesia issues or complications noted.  Discharge when criteria met.

## 2021-03-02 NOTE — ASU PATIENT PROFILE, ADULT - PMH
CHF (congestive heart failure)    COPD (chronic obstructive pulmonary disease)    DVT (deep venous thrombosis)    History of laryngeal cancer    HTN (hypertension)    Lupus    Pulmonary embolism    RA (rheumatoid arthritis)

## 2021-03-02 NOTE — ASU PATIENT PROFILE, ADULT - PSH
AICD (automatic cardioverter/defibrillator) present    H/O foot surgery    History of back surgery    S/P appendectomy    S/P cholecystectomy    S/P eye surgery    S/P hysterectomy

## 2021-03-02 NOTE — ASU PREOP CHECKLIST - AS BP NONINV SITE
right upper arm left arm precaution, band not applied at this time due to surgical procedure/right upper arm

## 2021-03-02 NOTE — ASU DISCHARGE PLAN (ADULT/PEDIATRIC) - CARE PROVIDER_API CALL
Waqas Guan)  Surgery; Vascular Surgery  [address] [place] [address]  Phone: [phone]  Fax: [phone]  Follow Up Time: 2 weeks

## 2021-03-07 DIAGNOSIS — Z87.891 PERSONAL HISTORY OF NICOTINE DEPENDENCE: ICD-10-CM

## 2021-03-07 DIAGNOSIS — I10 ESSENTIAL (PRIMARY) HYPERTENSION: ICD-10-CM

## 2021-03-07 DIAGNOSIS — Z86.711 PERSONAL HISTORY OF PULMONARY EMBOLISM: ICD-10-CM

## 2021-03-07 DIAGNOSIS — Z86.718 PERSONAL HISTORY OF OTHER VENOUS THROMBOSIS AND EMBOLISM: ICD-10-CM

## 2021-03-07 DIAGNOSIS — L93.2 OTHER LOCAL LUPUS ERYTHEMATOSUS: ICD-10-CM

## 2021-03-07 DIAGNOSIS — M06.9 RHEUMATOID ARTHRITIS, UNSPECIFIED: ICD-10-CM

## 2021-03-07 DIAGNOSIS — I87.1 COMPRESSION OF VEIN: ICD-10-CM

## 2021-03-07 DIAGNOSIS — I50.9 HEART FAILURE, UNSPECIFIED: ICD-10-CM

## 2021-03-07 DIAGNOSIS — R22.32 LOCALIZED SWELLING, MASS AND LUMP, LEFT UPPER LIMB: ICD-10-CM

## 2021-03-07 DIAGNOSIS — Z95.810 PRESENCE OF AUTOMATIC (IMPLANTABLE) CARDIAC DEFIBRILLATOR: ICD-10-CM

## 2021-03-07 DIAGNOSIS — J44.9 CHRONIC OBSTRUCTIVE PULMONARY DISEASE, UNSPECIFIED: ICD-10-CM

## 2021-03-07 DIAGNOSIS — Z85.21 PERSONAL HISTORY OF MALIGNANT NEOPLASM OF LARYNX: ICD-10-CM

## 2021-03-07 DIAGNOSIS — Z90.49 ACQUIRED ABSENCE OF OTHER SPECIFIED PARTS OF DIGESTIVE TRACT: ICD-10-CM

## 2021-03-07 DIAGNOSIS — Z90.710 ACQUIRED ABSENCE OF BOTH CERVIX AND UTERUS: ICD-10-CM

## 2021-03-08 ENCOUNTER — APPOINTMENT (OUTPATIENT)
Dept: VASCULAR SURGERY | Facility: CLINIC | Age: 51
End: 2021-03-08
Payer: MEDICARE

## 2021-03-08 VITALS
BODY MASS INDEX: 30.12 KG/M2 | HEIGHT: 63 IN | HEART RATE: 83 BPM | DIASTOLIC BLOOD PRESSURE: 92 MMHG | SYSTOLIC BLOOD PRESSURE: 162 MMHG | WEIGHT: 170 LBS | TEMPERATURE: 93.7 F

## 2021-03-08 PROCEDURE — 99212 OFFICE O/P EST SF 10 MIN: CPT

## 2021-03-08 NOTE — ASSESSMENT
[FreeTextEntry1] : 51 y/o female with h/o Lupus anticoagulant, LLE DVT and PE on Xarelto, h/o Pacemaker/ Defibrillator placement, now with left arm pain and swelling.\par \par She underwent left subclavian vein venoplasty on 3/2/2021, but arm swelling is not improved.\par \par She was advised to follow up with Dr. Eaton for further evaluation of the pacemaker leads. Advised Arm elevation and ace bandage for compression.\par \par I will see her back in 6 weeks time.

## 2021-03-08 NOTE — HISTORY OF PRESENT ILLNESS
[FreeTextEntry1] : 49 y/o female with h/o laryngeal cancer, s/p chemotherapy and radiation treatment, h/o pacemaker placement initially in 2003, underwent leads replaced 10 years ago, h/o LLE DVT and PE, h/o Lupus anticoagulant on lifelong Xarelto, had left arm pain and swelling, intermittently since December 2020. \par \par She underwent a left subclavian vein venoplasty on 3/2/2021, but swelling is not improved.

## 2021-03-08 NOTE — CONSULT LETTER
[Dear  ___] : Dear  [unfilled], [Courtesy Letter:] : I had the pleasure of seeing your patient, [unfilled], in my office today. [Please see my note below.] : Please see my note below. [FreeTextEntry2] : Dear Dr. Jason Eaton,\par Dear Dr. Musa Magana,

## 2021-03-15 ENCOUNTER — APPOINTMENT (OUTPATIENT)
Dept: VASCULAR SURGERY | Facility: CLINIC | Age: 51
End: 2021-03-15

## 2021-04-19 ENCOUNTER — APPOINTMENT (OUTPATIENT)
Dept: VASCULAR SURGERY | Facility: CLINIC | Age: 51
End: 2021-04-19

## 2021-04-22 ENCOUNTER — OUTPATIENT (OUTPATIENT)
Dept: OUTPATIENT SERVICES | Facility: HOSPITAL | Age: 51
LOS: 1 days | Discharge: HOME | End: 2021-04-22
Payer: MEDICARE

## 2021-04-22 VITALS
HEIGHT: 63 IN | DIASTOLIC BLOOD PRESSURE: 82 MMHG | HEART RATE: 83 BPM | WEIGHT: 167.99 LBS | OXYGEN SATURATION: 96 % | RESPIRATION RATE: 18 BRPM | SYSTOLIC BLOOD PRESSURE: 183 MMHG | TEMPERATURE: 98 F

## 2021-04-22 DIAGNOSIS — Z01.818 ENCOUNTER FOR OTHER PREPROCEDURAL EXAMINATION: ICD-10-CM

## 2021-04-22 DIAGNOSIS — I50.22 CHRONIC SYSTOLIC (CONGESTIVE) HEART FAILURE: ICD-10-CM

## 2021-04-22 DIAGNOSIS — Z90.49 ACQUIRED ABSENCE OF OTHER SPECIFIED PARTS OF DIGESTIVE TRACT: Chronic | ICD-10-CM

## 2021-04-22 DIAGNOSIS — Z98.890 OTHER SPECIFIED POSTPROCEDURAL STATES: Chronic | ICD-10-CM

## 2021-04-22 DIAGNOSIS — Z90.710 ACQUIRED ABSENCE OF BOTH CERVIX AND UTERUS: Chronic | ICD-10-CM

## 2021-04-22 DIAGNOSIS — Z95.810 PRESENCE OF AUTOMATIC (IMPLANTABLE) CARDIAC DEFIBRILLATOR: Chronic | ICD-10-CM

## 2021-04-22 LAB
ALBUMIN SERPL ELPH-MCNC: 4.8 G/DL — SIGNIFICANT CHANGE UP (ref 3.5–5.2)
ALP SERPL-CCNC: 88 U/L — SIGNIFICANT CHANGE UP (ref 30–115)
ALT FLD-CCNC: 16 U/L — SIGNIFICANT CHANGE UP (ref 0–41)
ANION GAP SERPL CALC-SCNC: 12 MMOL/L — SIGNIFICANT CHANGE UP (ref 7–14)
APPEARANCE UR: ABNORMAL
APTT BLD: SIGNIFICANT CHANGE UP (ref 27–39.2)
AST SERPL-CCNC: 20 U/L — SIGNIFICANT CHANGE UP (ref 0–41)
BACTERIA # UR AUTO: NEGATIVE — SIGNIFICANT CHANGE UP
BASOPHILS # BLD AUTO: 0.04 K/UL — SIGNIFICANT CHANGE UP (ref 0–0.2)
BASOPHILS NFR BLD AUTO: 0.5 % — SIGNIFICANT CHANGE UP (ref 0–1)
BILIRUB SERPL-MCNC: 0.5 MG/DL — SIGNIFICANT CHANGE UP (ref 0.2–1.2)
BILIRUB UR-MCNC: NEGATIVE — SIGNIFICANT CHANGE UP
BUN SERPL-MCNC: 12 MG/DL — SIGNIFICANT CHANGE UP (ref 10–20)
CALCIUM SERPL-MCNC: 10.1 MG/DL — SIGNIFICANT CHANGE UP (ref 8.5–10.1)
CHLORIDE SERPL-SCNC: 97 MMOL/L — LOW (ref 98–110)
CO2 SERPL-SCNC: 26 MMOL/L — SIGNIFICANT CHANGE UP (ref 17–32)
COLOR SPEC: YELLOW — SIGNIFICANT CHANGE UP
CREAT SERPL-MCNC: 1 MG/DL — SIGNIFICANT CHANGE UP (ref 0.7–1.5)
DIFF PNL FLD: ABNORMAL
EOSINOPHIL # BLD AUTO: 0.1 K/UL — SIGNIFICANT CHANGE UP (ref 0–0.7)
EOSINOPHIL NFR BLD AUTO: 1.4 % — SIGNIFICANT CHANGE UP (ref 0–8)
EPI CELLS # UR: 15 /HPF — HIGH (ref 0–5)
GLUCOSE SERPL-MCNC: 105 MG/DL — HIGH (ref 70–99)
GLUCOSE UR QL: NEGATIVE — SIGNIFICANT CHANGE UP
HCT VFR BLD CALC: 46.4 % — SIGNIFICANT CHANGE UP (ref 37–47)
HGB BLD-MCNC: 15.2 G/DL — SIGNIFICANT CHANGE UP (ref 12–16)
HYALINE CASTS # UR AUTO: 18 /LPF — HIGH (ref 0–7)
IMM GRANULOCYTES NFR BLD AUTO: 0.3 % — SIGNIFICANT CHANGE UP (ref 0.1–0.3)
KETONES UR-MCNC: SIGNIFICANT CHANGE UP
LEUKOCYTE ESTERASE UR-ACNC: ABNORMAL
LYMPHOCYTES # BLD AUTO: 1.8 K/UL — SIGNIFICANT CHANGE UP (ref 1.2–3.4)
LYMPHOCYTES # BLD AUTO: 24.4 % — SIGNIFICANT CHANGE UP (ref 20.5–51.1)
MCHC RBC-ENTMCNC: 28.5 PG — SIGNIFICANT CHANGE UP (ref 27–31)
MCHC RBC-ENTMCNC: 32.8 G/DL — SIGNIFICANT CHANGE UP (ref 32–37)
MCV RBC AUTO: 86.9 FL — SIGNIFICANT CHANGE UP (ref 81–99)
MONOCYTES # BLD AUTO: 0.68 K/UL — HIGH (ref 0.1–0.6)
MONOCYTES NFR BLD AUTO: 9.2 % — SIGNIFICANT CHANGE UP (ref 1.7–9.3)
NEUTROPHILS # BLD AUTO: 4.73 K/UL — SIGNIFICANT CHANGE UP (ref 1.4–6.5)
NEUTROPHILS NFR BLD AUTO: 64.2 % — SIGNIFICANT CHANGE UP (ref 42.2–75.2)
NITRITE UR-MCNC: NEGATIVE — SIGNIFICANT CHANGE UP
NRBC # BLD: 0 /100 WBCS — SIGNIFICANT CHANGE UP (ref 0–0)
PH UR: 6 — SIGNIFICANT CHANGE UP (ref 5–8)
PLATELET # BLD AUTO: 204 K/UL — SIGNIFICANT CHANGE UP (ref 130–400)
POTASSIUM SERPL-MCNC: 4.1 MMOL/L — SIGNIFICANT CHANGE UP (ref 3.5–5)
POTASSIUM SERPL-SCNC: 4.1 MMOL/L — SIGNIFICANT CHANGE UP (ref 3.5–5)
PROT SERPL-MCNC: 7.7 G/DL — SIGNIFICANT CHANGE UP (ref 6–8)
PROT UR-MCNC: ABNORMAL
RBC # BLD: 5.34 M/UL — SIGNIFICANT CHANGE UP (ref 4.2–5.4)
RBC # FLD: 13.1 % — SIGNIFICANT CHANGE UP (ref 11.5–14.5)
RBC CASTS # UR COMP ASSIST: 6 /HPF — HIGH (ref 0–4)
SODIUM SERPL-SCNC: 135 MMOL/L — SIGNIFICANT CHANGE UP (ref 135–146)
SP GR SPEC: 1.03 — HIGH (ref 1.01–1.03)
UROBILINOGEN FLD QL: ABNORMAL
WBC # BLD: 7.37 K/UL — SIGNIFICANT CHANGE UP (ref 4.8–10.8)
WBC # FLD AUTO: 7.37 K/UL — SIGNIFICANT CHANGE UP (ref 4.8–10.8)
WBC UR QL: 25 /HPF — HIGH (ref 0–5)

## 2021-04-22 PROCEDURE — 93010 ELECTROCARDIOGRAM REPORT: CPT

## 2021-04-22 RX ORDER — METOPROLOL TARTRATE 50 MG
1 TABLET ORAL
Qty: 0 | Refills: 0 | DISCHARGE

## 2021-04-22 NOTE — H&P PST ADULT - REASON FOR ADMISSION
RIGHT sided pacemaker laser lead removal sub clavicle stent RIGHT sided pacemaker laser lead removal sub clavicle stent scheduled on 4/27 under general anesthesia at [facility] with Dr Eaton, pt is scheduled to be admitted to ICU

## 2021-04-22 NOTE — H&P PST ADULT - RS GEN PE MLT RESP DETAILS PC
airway patent/breath sounds equal/good air movement/clear to auscultation bilaterally/no chest wall tenderness/no intercostal retractions

## 2021-04-22 NOTE — H&P PST ADULT - LIVING CHILDREN, OB PROFILE
Airway  Date/Time: 6/28/2018 10:32 AM  Urgency: elective    Difficult airway    General Information and Staff    Patient location during procedure: OR  Anesthesiologist: Jorge A Green  Resident/CRNA: Babs Blair  Performed: CRNA     Indications and P 2

## 2021-04-22 NOTE — H&P PST ADULT - HISTORY OF PRESENT ILLNESS
51 yo female  presents for PAST in preparation for ___ on ____.    Pt complains of SOB, HOLDER, left hand finger cramps, s/p cardiac stent that failed 3/2/2021. Right now left hand edema, redness, pain, inability to use hand, lymphoedema to right leg.  Denies any chest pain, difficulty breathing, palpitations, dysuria, URI, or any other infections in the last 2 weeks/1 month. Denies any recent travel, contact, or exposure to any persons with known or suspected COVID-19. Pt also denies COVID testing within the last 2 weeks. Pt advised to self quarantine until day of procedure. Exercise tolerance of  three steps without dyspnea. LUCY reviewed with patient.    Anesthesia Alert  NO--Difficult Airway  NO--History of neck surgery or radiation  NO--Limited ROM of neck  NO--History of Malignant hyperthermia  NO--Personal or family history of Pseudocholinesterase deficiency  YES--Prior Anesthesia Complication, vomiting   NO--Latex Allergy  NO--Loose teeth  YES--History of Rheumatoid Arthritis  NO--LUCY  NO--Other     written and verbal instructions with teach back on chlorhexidine shampoo provided,  pt verbalized understanding with returned demonstration   49 yo female  presents for PAST in preparation for RIGHT sided pacemaker laser lead removal sub clavicle stent scheduled on 4/27  Pt complains of SOB, HOLDER, reports severe weakness and inability to eat, weight loss, left hand finger cramps, s/p cardiac stent that failed on 3/2/2021. Right now left hand edema, redness, pain, inability to use hand, lymphoedema to right leg.  Denies any chest pain, difficulty breathing, palpitations, dysuria, URI, or any other infections in the last 2 weeks/1 month. Denies any recent travel, contact, or exposure to any persons with known or suspected COVID-19. Pt also denies COVID testing within the last 2 weeks. Pt advised to self quarantine until day of procedure. Exercise tolerance of two- three steps without dyspnea. LUCY reviewed with patient.    Anesthesia Alert  NO--Difficult Airway  NO--History of neck surgery or radiation  NO--Limited ROM of neck  NO--History of Malignant hyperthermia  NO--Personal or family history of Pseudocholinesterase deficiency  YES--Prior Anesthesia Complication, vomiting   NO--Latex Allergy  NO--Loose teeth  YES--History of Rheumatoid Arthritis ( C-Spine done 3/2021)  NO--LUCY  NO--Other     written and verbal instructions with teach back on chlorhexidine shampoo provided,  pt verbalized understanding with returned demonstration

## 2021-04-24 ENCOUNTER — OUTPATIENT (OUTPATIENT)
Dept: OUTPATIENT SERVICES | Facility: HOSPITAL | Age: 51
LOS: 1 days | Discharge: HOME | End: 2021-04-24

## 2021-04-24 DIAGNOSIS — Z98.890 OTHER SPECIFIED POSTPROCEDURAL STATES: Chronic | ICD-10-CM

## 2021-04-24 DIAGNOSIS — Z90.49 ACQUIRED ABSENCE OF OTHER SPECIFIED PARTS OF DIGESTIVE TRACT: Chronic | ICD-10-CM

## 2021-04-24 DIAGNOSIS — Z95.810 PRESENCE OF AUTOMATIC (IMPLANTABLE) CARDIAC DEFIBRILLATOR: Chronic | ICD-10-CM

## 2021-04-24 DIAGNOSIS — Z90.710 ACQUIRED ABSENCE OF BOTH CERVIX AND UTERUS: Chronic | ICD-10-CM

## 2021-04-24 DIAGNOSIS — Z11.59 ENCOUNTER FOR SCREENING FOR OTHER VIRAL DISEASES: ICD-10-CM

## 2021-04-24 LAB
CULTURE RESULTS: SIGNIFICANT CHANGE UP
SPECIMEN SOURCE: SIGNIFICANT CHANGE UP

## 2021-04-26 ENCOUNTER — NON-APPOINTMENT (OUTPATIENT)
Age: 51
End: 2021-04-26

## 2021-04-26 ENCOUNTER — OUTPATIENT (OUTPATIENT)
Dept: OUTPATIENT SERVICES | Facility: HOSPITAL | Age: 51
LOS: 1 days | Discharge: HOME | End: 2021-04-26

## 2021-04-26 VITALS
RESPIRATION RATE: 20 BRPM | OXYGEN SATURATION: 98 % | WEIGHT: 167.99 LBS | SYSTOLIC BLOOD PRESSURE: 128 MMHG | TEMPERATURE: 96 F | HEIGHT: 63 IN | HEART RATE: 72 BPM | DIASTOLIC BLOOD PRESSURE: 71 MMHG

## 2021-04-26 VITALS — WEIGHT: 167.55 LBS | HEIGHT: 63 IN

## 2021-04-26 DIAGNOSIS — Z98.890 OTHER SPECIFIED POSTPROCEDURAL STATES: Chronic | ICD-10-CM

## 2021-04-26 DIAGNOSIS — Z95.810 PRESENCE OF AUTOMATIC (IMPLANTABLE) CARDIAC DEFIBRILLATOR: Chronic | ICD-10-CM

## 2021-04-26 DIAGNOSIS — Z90.49 ACQUIRED ABSENCE OF OTHER SPECIFIED PARTS OF DIGESTIVE TRACT: Chronic | ICD-10-CM

## 2021-04-26 DIAGNOSIS — Z02.9 ENCOUNTER FOR ADMINISTRATIVE EXAMINATIONS, UNSPECIFIED: ICD-10-CM

## 2021-04-26 DIAGNOSIS — Z90.710 ACQUIRED ABSENCE OF BOTH CERVIX AND UTERUS: Chronic | ICD-10-CM

## 2021-04-26 LAB
APTT BLD: 33 SEC — SIGNIFICANT CHANGE UP (ref 27–39.2)
INR BLD: 0.97 RATIO — SIGNIFICANT CHANGE UP (ref 0.65–1.3)
PROTHROM AB SERPL-ACNC: 11.2 SEC — SIGNIFICANT CHANGE UP (ref 9.95–12.87)

## 2021-04-26 NOTE — PRE-ANESTHESIA EVALUATION ADULT - NSANTHPMHFT_GEN_ALL_CORE
49 y/o F with PMHx of HTN, RA, SLE, COPD, chronic pain secondary to sciatica ( on mutiple pain medications), laryngeal cancer, thyroid cancer, DVT, PE, CKD and former smoker. Scheduled for laser lead extraction and left subclavian stent.

## 2021-04-26 NOTE — PRE-ANESTHESIA EVALUATION ADULT - NSRADCARDRESULTSFT_GEN_ALL_CORE
XR Cervical spine (2/17/21):  Stepladder multilevel trace spondylolisthesis with reduction in flexion consistent with ligamentous laxity/spinal instability.  The vertebral body heights are maintained. The intervertebral disc spaces are preserved. No acute fracture or dislocation.    TTE (6/20/2019):  1. Normal global left ventricular systolic function.    2. LV Ejection Fraction by Hendricks's Method with a biplane EF of 63 %.    3. Normal left ventricular internal cavity size.    4. Normal right atrial size.    5. There is no evidence of pericardial effusion.    6. Mild thickening of the anterior and posterior mitral valve leaflets.    7. Peak transaortic gradient equals 8.5 mmHg, mean transaortic gradient equals 3.7 mmHg, the calculated aortic valve area equals 2.30 cm2 by the   continuity equation consistent with mild aortic stenosis.

## 2021-04-27 ENCOUNTER — RESULT REVIEW (OUTPATIENT)
Age: 51
End: 2021-04-27

## 2021-04-27 ENCOUNTER — INPATIENT (INPATIENT)
Facility: HOSPITAL | Age: 51
LOS: 5 days | Discharge: HOME | End: 2021-05-03
Attending: INTERNAL MEDICINE | Admitting: INTERNAL MEDICINE
Payer: MEDICARE

## 2021-04-27 VITALS
DIASTOLIC BLOOD PRESSURE: 56 MMHG | TEMPERATURE: 95 F | WEIGHT: 177.03 LBS | SYSTOLIC BLOOD PRESSURE: 113 MMHG | HEART RATE: 62 BPM | RESPIRATION RATE: 14 BRPM

## 2021-04-27 DIAGNOSIS — Z95.810 PRESENCE OF AUTOMATIC (IMPLANTABLE) CARDIAC DEFIBRILLATOR: Chronic | ICD-10-CM

## 2021-04-27 DIAGNOSIS — Z98.890 OTHER SPECIFIED POSTPROCEDURAL STATES: Chronic | ICD-10-CM

## 2021-04-27 DIAGNOSIS — Z90.49 ACQUIRED ABSENCE OF OTHER SPECIFIED PARTS OF DIGESTIVE TRACT: Chronic | ICD-10-CM

## 2021-04-27 DIAGNOSIS — Z90.710 ACQUIRED ABSENCE OF BOTH CERVIX AND UTERUS: Chronic | ICD-10-CM

## 2021-04-27 LAB
ALBUMIN SERPL ELPH-MCNC: 3.5 G/DL — SIGNIFICANT CHANGE UP (ref 3.5–5.2)
ALP SERPL-CCNC: 53 U/L — SIGNIFICANT CHANGE UP (ref 30–115)
ALT FLD-CCNC: 9 U/L — SIGNIFICANT CHANGE UP (ref 0–41)
ANION GAP SERPL CALC-SCNC: 8 MMOL/L — SIGNIFICANT CHANGE UP (ref 7–14)
APTT BLD: 47.7 SEC — HIGH (ref 27–39.2)
AST SERPL-CCNC: 26 U/L — SIGNIFICANT CHANGE UP (ref 0–41)
BASOPHILS # BLD AUTO: 0.01 K/UL — SIGNIFICANT CHANGE UP (ref 0–0.2)
BASOPHILS NFR BLD AUTO: 0.1 % — SIGNIFICANT CHANGE UP (ref 0–1)
BILIRUB SERPL-MCNC: 0.5 MG/DL — SIGNIFICANT CHANGE UP (ref 0.2–1.2)
BLD GP AB SCN SERPL QL: SIGNIFICANT CHANGE UP
BUN SERPL-MCNC: 15 MG/DL — SIGNIFICANT CHANGE UP (ref 10–20)
CALCIUM SERPL-MCNC: 8.2 MG/DL — LOW (ref 8.5–10.1)
CHLORIDE SERPL-SCNC: 106 MMOL/L — SIGNIFICANT CHANGE UP (ref 98–110)
CO2 SERPL-SCNC: 25 MMOL/L — SIGNIFICANT CHANGE UP (ref 17–32)
CREAT SERPL-MCNC: 0.8 MG/DL — SIGNIFICANT CHANGE UP (ref 0.7–1.5)
CULTURE RESULTS: SIGNIFICANT CHANGE UP
EOSINOPHIL # BLD AUTO: 0 K/UL — SIGNIFICANT CHANGE UP (ref 0–0.7)
EOSINOPHIL NFR BLD AUTO: 0 % — SIGNIFICANT CHANGE UP (ref 0–8)
GLUCOSE BLDC GLUCOMTR-MCNC: 182 MG/DL — HIGH (ref 70–99)
GLUCOSE SERPL-MCNC: 140 MG/DL — HIGH (ref 70–99)
HCT VFR BLD CALC: 33.5 % — LOW (ref 37–47)
HGB BLD-MCNC: 10.8 G/DL — LOW (ref 12–16)
IMM GRANULOCYTES NFR BLD AUTO: 0.5 % — HIGH (ref 0.1–0.3)
INR BLD: 1.13 RATIO — SIGNIFICANT CHANGE UP (ref 0.65–1.3)
LYMPHOCYTES # BLD AUTO: 0.38 K/UL — LOW (ref 1.2–3.4)
LYMPHOCYTES # BLD AUTO: 3.9 % — LOW (ref 20.5–51.1)
MAGNESIUM SERPL-MCNC: 1.6 MG/DL — LOW (ref 1.8–2.4)
MCHC RBC-ENTMCNC: 28.7 PG — SIGNIFICANT CHANGE UP (ref 27–31)
MCHC RBC-ENTMCNC: 32.2 G/DL — SIGNIFICANT CHANGE UP (ref 32–37)
MCV RBC AUTO: 89.1 FL — SIGNIFICANT CHANGE UP (ref 81–99)
MONOCYTES # BLD AUTO: 0.43 K/UL — SIGNIFICANT CHANGE UP (ref 0.1–0.6)
MONOCYTES NFR BLD AUTO: 4.4 % — SIGNIFICANT CHANGE UP (ref 1.7–9.3)
NEUTROPHILS # BLD AUTO: 9 K/UL — HIGH (ref 1.4–6.5)
NEUTROPHILS NFR BLD AUTO: 91.1 % — HIGH (ref 42.2–75.2)
NRBC # BLD: 0 /100 WBCS — SIGNIFICANT CHANGE UP (ref 0–0)
PLATELET # BLD AUTO: 134 K/UL — SIGNIFICANT CHANGE UP (ref 130–400)
POTASSIUM SERPL-MCNC: 4.1 MMOL/L — SIGNIFICANT CHANGE UP (ref 3.5–5)
POTASSIUM SERPL-SCNC: 4.1 MMOL/L — SIGNIFICANT CHANGE UP (ref 3.5–5)
PROT SERPL-MCNC: 5.1 G/DL — LOW (ref 6–8)
PROTHROM AB SERPL-ACNC: 13 SEC — HIGH (ref 9.95–12.87)
RBC # BLD: 3.76 M/UL — LOW (ref 4.2–5.4)
RBC # FLD: 13.2 % — SIGNIFICANT CHANGE UP (ref 11.5–14.5)
SODIUM SERPL-SCNC: 139 MMOL/L — SIGNIFICANT CHANGE UP (ref 135–146)
SPECIMEN SOURCE: SIGNIFICANT CHANGE UP
WBC # BLD: 9.87 K/UL — SIGNIFICANT CHANGE UP (ref 4.8–10.8)
WBC # FLD AUTO: 9.87 K/UL — SIGNIFICANT CHANGE UP (ref 4.8–10.8)

## 2021-04-27 PROCEDURE — 71045 X-RAY EXAM CHEST 1 VIEW: CPT | Mod: 26

## 2021-04-27 PROCEDURE — 33249 INSJ/RPLCMT DEFIB W/LEAD(S): CPT

## 2021-04-27 PROCEDURE — 76937 US GUIDE VASCULAR ACCESS: CPT | Mod: 26,GC

## 2021-04-27 PROCEDURE — 33244 REMOVE ELCTRD TRANSVENOUSLY: CPT

## 2021-04-27 PROCEDURE — 37239 OPEN/PERQ PLACE STENT EA ADD: CPT | Mod: GC

## 2021-04-27 PROCEDURE — 37238 OPEN/PERQ PLACE STENT SAME: CPT

## 2021-04-27 RX ORDER — VANCOMYCIN HCL 1 G
1000 VIAL (EA) INTRAVENOUS EVERY 12 HOURS
Refills: 0 | Status: COMPLETED | OUTPATIENT
Start: 2021-04-27 | End: 2021-04-28

## 2021-04-27 RX ORDER — METHOCARBAMOL 500 MG/1
500 TABLET, FILM COATED ORAL EVERY 8 HOURS
Refills: 0 | Status: DISCONTINUED | OUTPATIENT
Start: 2021-04-27 | End: 2021-05-03

## 2021-04-27 RX ORDER — ZOLPIDEM TARTRATE 10 MG/1
5 TABLET ORAL AT BEDTIME
Refills: 0 | Status: DISCONTINUED | OUTPATIENT
Start: 2021-04-27 | End: 2021-05-03

## 2021-04-27 RX ORDER — ACETAMINOPHEN 500 MG
1000 TABLET ORAL ONCE
Refills: 0 | Status: COMPLETED | OUTPATIENT
Start: 2021-04-27 | End: 2021-04-27

## 2021-04-27 RX ORDER — FAMOTIDINE 10 MG/ML
20 INJECTION INTRAVENOUS EVERY 12 HOURS
Refills: 0 | Status: DISCONTINUED | OUTPATIENT
Start: 2021-04-27 | End: 2021-05-03

## 2021-04-27 RX ORDER — METOCLOPRAMIDE HCL 10 MG
10 TABLET ORAL THREE TIMES A DAY
Refills: 0 | Status: DISCONTINUED | OUTPATIENT
Start: 2021-04-27 | End: 2021-05-03

## 2021-04-27 RX ORDER — MAGNESIUM SULFATE 500 MG/ML
2 VIAL (ML) INJECTION ONCE
Refills: 0 | Status: COMPLETED | OUTPATIENT
Start: 2021-04-27 | End: 2021-04-27

## 2021-04-27 RX ORDER — IPRATROPIUM BROMIDE 0.2 MG/ML
2 SOLUTION, NON-ORAL INHALATION EVERY 6 HOURS
Refills: 0 | Status: DISCONTINUED | OUTPATIENT
Start: 2021-04-27 | End: 2021-05-03

## 2021-04-27 RX ORDER — SODIUM CHLORIDE 9 MG/ML
1000 INJECTION INTRAMUSCULAR; INTRAVENOUS; SUBCUTANEOUS
Refills: 0 | Status: DISCONTINUED | OUTPATIENT
Start: 2021-04-27 | End: 2021-05-03

## 2021-04-27 RX ORDER — BACLOFEN 100 %
20 POWDER (GRAM) MISCELLANEOUS
Refills: 0 | Status: DISCONTINUED | OUTPATIENT
Start: 2021-04-27 | End: 2021-04-27

## 2021-04-27 RX ORDER — ALBUTEROL 90 UG/1
2 AEROSOL, METERED ORAL EVERY 6 HOURS
Refills: 0 | Status: DISCONTINUED | OUTPATIENT
Start: 2021-04-27 | End: 2021-05-03

## 2021-04-27 RX ORDER — HYDROMORPHONE HYDROCHLORIDE 2 MG/ML
2 INJECTION INTRAMUSCULAR; INTRAVENOUS; SUBCUTANEOUS EVERY 4 HOURS
Refills: 0 | Status: DISCONTINUED | OUTPATIENT
Start: 2021-04-27 | End: 2021-04-29

## 2021-04-27 RX ORDER — POLYETHYLENE GLYCOL 3350 17 G/17G
17 POWDER, FOR SOLUTION ORAL DAILY
Refills: 0 | Status: DISCONTINUED | OUTPATIENT
Start: 2021-04-27 | End: 2021-05-03

## 2021-04-27 RX ORDER — METOPROLOL TARTRATE 50 MG
100 TABLET ORAL
Refills: 0 | Status: DISCONTINUED | OUTPATIENT
Start: 2021-04-27 | End: 2021-04-28

## 2021-04-27 RX ORDER — CLOPIDOGREL BISULFATE 75 MG/1
75 TABLET, FILM COATED ORAL DAILY
Refills: 0 | Status: DISCONTINUED | OUTPATIENT
Start: 2021-04-27 | End: 2021-05-02

## 2021-04-27 RX ORDER — FOLIC ACID 0.8 MG
1 TABLET ORAL DAILY
Refills: 0 | Status: DISCONTINUED | OUTPATIENT
Start: 2021-04-27 | End: 2021-05-03

## 2021-04-27 RX ORDER — HYDROMORPHONE HYDROCHLORIDE 2 MG/ML
1 INJECTION INTRAMUSCULAR; INTRAVENOUS; SUBCUTANEOUS ONCE
Refills: 0 | Status: DISCONTINUED | OUTPATIENT
Start: 2021-04-27 | End: 2021-04-27

## 2021-04-27 RX ORDER — HYDROMORPHONE HYDROCHLORIDE 2 MG/ML
2 INJECTION INTRAMUSCULAR; INTRAVENOUS; SUBCUTANEOUS ONCE
Refills: 0 | Status: DISCONTINUED | OUTPATIENT
Start: 2021-04-27 | End: 2021-04-27

## 2021-04-27 RX ORDER — ALPRAZOLAM 0.25 MG
1 TABLET ORAL THREE TIMES A DAY
Refills: 0 | Status: DISCONTINUED | OUTPATIENT
Start: 2021-04-27 | End: 2021-05-03

## 2021-04-27 RX ORDER — MORPHINE SULFATE 50 MG/1
30 CAPSULE, EXTENDED RELEASE ORAL
Refills: 0 | Status: DISCONTINUED | OUTPATIENT
Start: 2021-04-27 | End: 2021-04-27

## 2021-04-27 RX ADMIN — Medication 100 MILLIGRAM(S): at 17:58

## 2021-04-27 RX ADMIN — CLOPIDOGREL BISULFATE 75 MILLIGRAM(S): 75 TABLET, FILM COATED ORAL at 17:58

## 2021-04-27 RX ADMIN — MORPHINE SULFATE 30 MILLILITER(S): 50 CAPSULE, EXTENDED RELEASE ORAL at 17:30

## 2021-04-27 RX ADMIN — HYDROMORPHONE HYDROCHLORIDE 1 MILLIGRAM(S): 2 INJECTION INTRAMUSCULAR; INTRAVENOUS; SUBCUTANEOUS at 17:00

## 2021-04-27 RX ADMIN — Medication 1000 MILLIGRAM(S): at 18:25

## 2021-04-27 RX ADMIN — Medication 250 MILLIGRAM(S): at 21:21

## 2021-04-27 RX ADMIN — Medication 50 GRAM(S): at 21:10

## 2021-04-27 RX ADMIN — HYDROMORPHONE HYDROCHLORIDE 2 MILLIGRAM(S): 2 INJECTION INTRAMUSCULAR; INTRAVENOUS; SUBCUTANEOUS at 22:47

## 2021-04-27 RX ADMIN — HYDROMORPHONE HYDROCHLORIDE 2 MILLIGRAM(S): 2 INJECTION INTRAMUSCULAR; INTRAVENOUS; SUBCUTANEOUS at 19:00

## 2021-04-27 RX ADMIN — Medication 400 MILLIGRAM(S): at 18:05

## 2021-04-27 RX ADMIN — SODIUM CHLORIDE 20 MILLILITER(S): 9 INJECTION INTRAMUSCULAR; INTRAVENOUS; SUBCUTANEOUS at 17:59

## 2021-04-27 RX ADMIN — FAMOTIDINE 20 MILLIGRAM(S): 10 INJECTION INTRAVENOUS at 18:00

## 2021-04-27 RX ADMIN — Medication 10 MILLIGRAM(S): at 22:44

## 2021-04-27 RX ADMIN — HYDROMORPHONE HYDROCHLORIDE 2 MILLIGRAM(S): 2 INJECTION INTRAMUSCULAR; INTRAVENOUS; SUBCUTANEOUS at 23:18

## 2021-04-27 RX ADMIN — Medication 1 MILLIGRAM(S): at 17:58

## 2021-04-27 RX ADMIN — HYDROMORPHONE HYDROCHLORIDE 1 MILLIGRAM(S): 2 INJECTION INTRAMUSCULAR; INTRAVENOUS; SUBCUTANEOUS at 16:45

## 2021-04-27 NOTE — PACU DISCHARGE NOTE - COMMENTS
50F s/p laser lead extraction and subclavian stent + AICD replacement. Pt was given 1mg dilaudid immediately prior to extubation and 1000mg ofirmev, however still had some residual pain. PCA will be ordered. Pt states she is having severe lower back pain.     Vitals  HR 72  /55 [arterial]  RR 18  SpO2 98% on 10L FM   T 96.5F

## 2021-04-27 NOTE — PROGRESS NOTE ADULT - SUBJECTIVE AND OBJECTIVE BOX
Electrophysiology Brief Post-Op Note    I have personally seen and examined the patient.  I agree with the history and physical which I have reviewed and noted any changes below.  04-27-21 @ 07:58    PRE-OP DIAGNOSIS: subclavian vein stenosis     POST-OP DIAGNOSIS: subclavian vein stenosis     PROCEDURE: laser lead extraction , icd implant, PCI of subclavian vein    Vendor Representative was present for clinical support.    Physician: Uma  Assistant: None    ESTIMATED BLOOD LOSS:  200     mL    ANESTHESIA TYPE:  [  ]General Anesthesia  [ X ] Sedation  [ X ] Local/Regional    CONDITION  [  ] Critical  [  ] Serious  [  ]Fair  [ X ]Good      SPECIMENS REMOVED (IF APPLICABLE):  leads    IMPLANTS (IF APPLICABLE)  ICD    FINDINGS: none    COMPLICATIONS: none     PLAN OF CARE    - Vancomyocin 1g IV q12 h  - Chest X-ray PA/Lat now and tomorrow am  - Device interrogation tomorrow in am  - DC all heparin produces and lovenox  - No anticoagulation unless recomended by EP attending

## 2021-04-27 NOTE — BRIEF OPERATIVE NOTE - NSICDXBRIEFPROCEDURE_GEN_ALL_CORE_FT
PROCEDURES:  Venogram extremity left 27-Apr-2021 16:23:09  Tamie Bryson  Venogram superior vena cava 27-Apr-2021 16:23:27  Tamie Bryson  Insertion, stent, subclavian vein 27-Apr-2021 16:23:38  Tamie Bryson

## 2021-04-27 NOTE — CHART NOTE - NSCHARTNOTEFT_GEN_A_CORE
Cardiac Electrophysiology Procedure Note      Procedure:  RV Lead Extraction  Implant of new RV lead  Implant of ICD  Generator change  Fluoroscopy    Indication:  51 yo with history of sick sinus syndrome, long QT syndrome status post cardiac arrest. Patient developed painful left arm swelling and was referred for laser lead extraction and stenting of the subclavian vein    Attending:	Jason Eaton MD    EQUIPMENT IMPLANTED AND BASELINE PARAMETERS       Pulse Generator Implanted   :   Medtronic  Model Number	[id number]  Serial Number	[id number]    Pulse Generator Explanted  :	Medtronic  Model Number	[id number]  Serial Number	[id number]       Atrial Lead explanted:  Model Number:	5076  Serial Number:	[id number]  Location	Right atrial appending    Atrial Lead explanted:  Model Number:	4469  Serial Number:	[id number]  Location	Right atrial appending      Atrial Lead implanted:  Model Number:	5076  Serial Number:	[id number]  Location	Right atrial appending  Threshold:	0.9 at 0.5V ms  Sensin.9 mV  Impedance:	532 Ohms    						  Right Ventricular Lead implanted:  Model Number:	6935  Serial Number:	[id number]  Location	RV septal apex  Threshold:	1.2 V at 0.5 ms  Sensin.0 mV  Impedance:	994 Ohms    Right Ventricular Lead extracted:   Model Number:	0157  Serial Number:	[id number]  Location	Branch of the CS  			    DESCRIPTION OF PROCEDURE  The patient was brought to the Operating Room in a nonsedated and fasting state,   having received preoperative antibiotics. Informed, written consent was obtained   prior to the procedure. Patietn was intubated and analgesia was maintained by anesthesia throughout the procedure. Blood pressure, oxygenation and level of comfort were stable throughout.     Then, the chest and shoulder regions were cleaned and prepped with serial applications of Betadine Scrub, Betadine Solution and Isopropyl alcohol. Patient was then covered from head to toe with sterile drapes in the usual manner leaving the chest and left shoulder area accessible. Next, using needle and guidewire, the right femoral vein was accessed twice using modified Seldinger technique. The guidewires were advanced to the IVC, left of the spine, to confirm venous access. One 11Fr and 6 Fr  introducer sheaths were placed into the femoral vein. The dilators and guidewires were removed. A pacing catheter was advanced to the right ventricle for pacing support. The fluoroscopic anatomy of the left shoulder region was inspected, along with device and lead position and orientation. The left deltopectoral groove region was defined and a 4.0 cm. incision was then made in this area parallel to the clavicle. This incision was carried down to the pre-pectoral fascia using electrocautery and blunt dissection. The device capsule was identified and opened.     The a style was passed to the tip of the RV lead without difficulty. Spectranetics locking styllette #EZ was advanced into the lead. An attempt was made to unscrew the lead from the myocardium.  The lead was cut and the insulation exposed. The lead insulation were secured to the locking stylet with two 0 silk sutures tied to the insulation and the defibrillation coils. The stylet, sutures connected to the insulation of the lead and the lead body were then advanced through the laser as one unit. The laser was then calibrated. The laser was then advanced into a 16 Fr. laser sheath. While applying traction to the lead, the laser was advanced over the lead while lassering. The laser was advanced all the way down to the right atrium however near the tricuspid annulus laser was unable to be passed any further. Therefore a TightRail rotating mechanical dilator was utilized to pass the obstruction and the lead was removed from the myocardium. A purse stitch was placed around the entrance of the laser. The access was retained by placing three wire through the laser.     The a style was passed to the tip of the RA lead without difficulty. Spectranetics locking styllette #EZ was advanced into the lead. An attempt was made to unscrew the lead from the myocardium.  The lead was cut and the insulation exposed. The lead insulation were secured to the locking stylet with two 0 silk sutures tied to the insulation. The stylet, sutures connected to the insulation of the lead and the lead body were then advanced through the laser as one unit. The laser was then calibrated. The laser was then advanced into a 14 Fr. While applying traction to the lead, the laser was advanced over the lead while lassering. The laser was advanced all the way down RA and the lead was removed from the myocardium. A purse stitch was placed around the entrance of the laser. The access was retained by placing three wire through the laser.     The a style was passed to the tip of the RA lead without difficulty. Spectranetics locking styllette #EZ was advanced into the lead. An attempt was made to unscrew the lead from the myocardium.  The lead was cut and the insulation exposed. The lead insulation were secured to the locking stylet with two 0 silk sutures tied to the insulation. The stylet, sutures connected to the insulation of the lead and the lead body were then advanced through the laser as one unit. The laser was then calibrated. The laser was then advanced into a 14 Fr. While applying traction to the lead, the laser was advanced over the lead while lassering. The laser was advanced all the way down RA lead was removed from the myocardium. The tip of the lead remained in the myocardium.   The vascular team took over the procedure and impacted subclavian stent. Please refer to their note for details.    Re-implant of ICD  A 9 Malawian introducer sheath was placed into the axillary vein. The dilator and guidewire were removed and, through this sheath, the right ventricular lead was advanced into the heart. Using straight and curved soft stylettes, the ventricular lead was passed across the tricuspid annulus, advanced into the right ventricular outflow tract, and then pulled down against the interventricular septum. The apex of the RV was mapped extensively. Once adequate pacing and sensing threshold parameters were obtained, the fixation screw was extended. The lead position was confirmed in the Moroccan and GREGORY projections. The paced QRS configuration was that of a septally placed right ventricular pacing lead. The sheath was split and removed. Adequate slack was placed into the lead and the lead was secured into the pocket using 0 silk suture x 3. There was no evidence of diaphragmatic pacing at maximal output.   Next, a 7 Malawian sheath was inserted through the second wire into the axillary vein.  The dilator and guidewire were removed and, through this sheath, the atrial pacing lead was advanced into the heart under fluoroscopic guidance.  Next, using a J curved stylet the atrial lead was placed in the right atrial appendage.  Once adequate pacing and sensing threshold parameters were obtained, the fixation screw was extended and the sheath was split and removed. The lead position was confirmed in the Moroccan and GREGORY projections.  Adequate slack was placed into the lead, and the lead was secured into the pocket using 0 silk suture x 2. There was no evidence of diaphragmatic pacing at maximal output.  	Hemostasis was confirmed, and the wound was irrigated with Antibiotics irrigation solution.  The leads were connected to the appropriate ports on the generator and secured by tightening the header set screws.  The leads and generator were carefully folded into the pocket.    		The wound margins approximated well, without any tension or overlap. The wound was closed using an interrupted layer of 2-0 absorbable Dacron suture for the deep  layer. This was followed by a continuous layer of 3-0 absorbable suture.  The skin was then closed using 4-0 absorbable Dacron sutures.  Steri-strips were placed over the wound.  A dry, sterile dressing was placed over this.  The patient's left upper extremity was placed in a sling. An ice pack was placed on top of the wound, and the patient was returned to a hospital room in stable condition. Needle count was performed and found to be consistent at the end of the procedure    The patient was returned to CT ICU in stable condition for extubation. JYOTHI did not show any pericardial effusion. Needle count was performed and found to be consistent at the end of the procedure   	  .  COMPLICATIONS:  The patient tolerated the procedure well. There were no immediate complications.    CONCLUSIONS:  Successful Laser lead extraction of the RV and RA lead and implant of new ICD implant.      _______________________________  Jason Eaton MD  Cardiac Electrophysiology

## 2021-04-27 NOTE — BRIEF OPERATIVE NOTE - OPERATION/FINDINGS
12 x 60 mm Venovo stent inserted in left SC vein-Innominate junction- post dilated to 12 mm.  EP service accessed the axillary artery distal to the stent and passed the new pacemaker wires inside the venovo stent.

## 2021-04-28 DIAGNOSIS — Z02.9 ENCOUNTER FOR ADMINISTRATIVE EXAMINATIONS, UNSPECIFIED: ICD-10-CM

## 2021-04-28 LAB
ALBUMIN SERPL ELPH-MCNC: 3.3 G/DL — LOW (ref 3.5–5.2)
ALP SERPL-CCNC: 49 U/L — SIGNIFICANT CHANGE UP (ref 30–115)
ALT FLD-CCNC: 9 U/L — SIGNIFICANT CHANGE UP (ref 0–41)
ANION GAP SERPL CALC-SCNC: 9 MMOL/L — SIGNIFICANT CHANGE UP (ref 7–14)
APTT BLD: 35.4 SEC — SIGNIFICANT CHANGE UP (ref 27–39.2)
AST SERPL-CCNC: 24 U/L — SIGNIFICANT CHANGE UP (ref 0–41)
BASOPHILS # BLD AUTO: 0.03 K/UL — SIGNIFICANT CHANGE UP (ref 0–0.2)
BASOPHILS NFR BLD AUTO: 0.3 % — SIGNIFICANT CHANGE UP (ref 0–1)
BILIRUB SERPL-MCNC: 0.2 MG/DL — SIGNIFICANT CHANGE UP (ref 0.2–1.2)
BUN SERPL-MCNC: 11 MG/DL — SIGNIFICANT CHANGE UP (ref 10–20)
CALCIUM SERPL-MCNC: 8.1 MG/DL — LOW (ref 8.5–10.1)
CHLORIDE SERPL-SCNC: 104 MMOL/L — SIGNIFICANT CHANGE UP (ref 98–110)
CO2 SERPL-SCNC: 24 MMOL/L — SIGNIFICANT CHANGE UP (ref 17–32)
COVID-19 SPIKE DOMAIN AB INTERP: NEGATIVE — SIGNIFICANT CHANGE UP
COVID-19 SPIKE DOMAIN ANTIBODY RESULT: 0.4 U/ML — SIGNIFICANT CHANGE UP
CREAT SERPL-MCNC: 0.8 MG/DL — SIGNIFICANT CHANGE UP (ref 0.7–1.5)
EOSINOPHIL # BLD AUTO: 0.04 K/UL — SIGNIFICANT CHANGE UP (ref 0–0.7)
EOSINOPHIL NFR BLD AUTO: 0.3 % — SIGNIFICANT CHANGE UP (ref 0–8)
GLUCOSE SERPL-MCNC: 136 MG/DL — HIGH (ref 70–99)
HCT VFR BLD CALC: 27.9 % — LOW (ref 37–47)
HCT VFR BLD CALC: 29.7 % — LOW (ref 37–47)
HGB BLD-MCNC: 9.2 G/DL — LOW (ref 12–16)
HGB BLD-MCNC: 9.7 G/DL — LOW (ref 12–16)
IMM GRANULOCYTES NFR BLD AUTO: 0.4 % — HIGH (ref 0.1–0.3)
INR BLD: 1.16 RATIO — SIGNIFICANT CHANGE UP (ref 0.65–1.3)
LYMPHOCYTES # BLD AUTO: 0.76 K/UL — LOW (ref 1.2–3.4)
LYMPHOCYTES # BLD AUTO: 6.4 % — LOW (ref 20.5–51.1)
MAGNESIUM SERPL-MCNC: 1.8 MG/DL — SIGNIFICANT CHANGE UP (ref 1.8–2.4)
MCHC RBC-ENTMCNC: 28.9 PG — SIGNIFICANT CHANGE UP (ref 27–31)
MCHC RBC-ENTMCNC: 29 PG — SIGNIFICANT CHANGE UP (ref 27–31)
MCHC RBC-ENTMCNC: 32.7 G/DL — SIGNIFICANT CHANGE UP (ref 32–37)
MCHC RBC-ENTMCNC: 33 G/DL — SIGNIFICANT CHANGE UP (ref 32–37)
MCV RBC AUTO: 87.7 FL — SIGNIFICANT CHANGE UP (ref 81–99)
MCV RBC AUTO: 88.7 FL — SIGNIFICANT CHANGE UP (ref 81–99)
MONOCYTES # BLD AUTO: 0.92 K/UL — HIGH (ref 0.1–0.6)
MONOCYTES NFR BLD AUTO: 7.8 % — SIGNIFICANT CHANGE UP (ref 1.7–9.3)
NEUTROPHILS # BLD AUTO: 10.02 K/UL — HIGH (ref 1.4–6.5)
NEUTROPHILS NFR BLD AUTO: 84.8 % — HIGH (ref 42.2–75.2)
NRBC # BLD: 0 /100 WBCS — SIGNIFICANT CHANGE UP (ref 0–0)
NRBC # BLD: 0 /100 WBCS — SIGNIFICANT CHANGE UP (ref 0–0)
PLATELET # BLD AUTO: 126 K/UL — LOW (ref 130–400)
PLATELET # BLD AUTO: 133 K/UL — SIGNIFICANT CHANGE UP (ref 130–400)
POTASSIUM SERPL-MCNC: 4.2 MMOL/L — SIGNIFICANT CHANGE UP (ref 3.5–5)
POTASSIUM SERPL-SCNC: 4.2 MMOL/L — SIGNIFICANT CHANGE UP (ref 3.5–5)
PROT SERPL-MCNC: 4.8 G/DL — LOW (ref 6–8)
PROTHROM AB SERPL-ACNC: 13.3 SEC — HIGH (ref 9.95–12.87)
RBC # BLD: 3.18 M/UL — LOW (ref 4.2–5.4)
RBC # BLD: 3.35 M/UL — LOW (ref 4.2–5.4)
RBC # FLD: 13.2 % — SIGNIFICANT CHANGE UP (ref 11.5–14.5)
RBC # FLD: 13.3 % — SIGNIFICANT CHANGE UP (ref 11.5–14.5)
SARS-COV-2 IGG+IGM SERPL QL IA: 0.4 U/ML — SIGNIFICANT CHANGE UP
SARS-COV-2 IGG+IGM SERPL QL IA: NEGATIVE — SIGNIFICANT CHANGE UP
SODIUM SERPL-SCNC: 137 MMOL/L — SIGNIFICANT CHANGE UP (ref 135–146)
WBC # BLD: 11.09 K/UL — HIGH (ref 4.8–10.8)
WBC # BLD: 11.82 K/UL — HIGH (ref 4.8–10.8)
WBC # FLD AUTO: 11.09 K/UL — HIGH (ref 4.8–10.8)
WBC # FLD AUTO: 11.82 K/UL — HIGH (ref 4.8–10.8)

## 2021-04-28 PROCEDURE — 71250 CT THORAX DX C-: CPT | Mod: 26

## 2021-04-28 PROCEDURE — 71046 X-RAY EXAM CHEST 2 VIEWS: CPT | Mod: 26

## 2021-04-28 PROCEDURE — 93010 ELECTROCARDIOGRAM REPORT: CPT

## 2021-04-28 PROCEDURE — 99232 SBSQ HOSP IP/OBS MODERATE 35: CPT | Mod: GC

## 2021-04-28 PROCEDURE — 71045 X-RAY EXAM CHEST 1 VIEW: CPT | Mod: 26,59

## 2021-04-28 PROCEDURE — 93287 PERI-PX DEVICE EVAL & PRGR: CPT | Mod: 26

## 2021-04-28 PROCEDURE — 74176 CT ABD & PELVIS W/O CONTRAST: CPT | Mod: 26

## 2021-04-28 RX ORDER — MAGNESIUM SULFATE 500 MG/ML
2 VIAL (ML) INJECTION ONCE
Refills: 0 | Status: COMPLETED | OUTPATIENT
Start: 2021-04-28 | End: 2021-04-28

## 2021-04-28 RX ORDER — SODIUM CHLORIDE 9 MG/ML
250 INJECTION INTRAMUSCULAR; INTRAVENOUS; SUBCUTANEOUS ONCE
Refills: 0 | Status: COMPLETED | OUTPATIENT
Start: 2021-04-28 | End: 2021-04-28

## 2021-04-28 RX ORDER — CHLORHEXIDINE GLUCONATE 213 G/1000ML
1 SOLUTION TOPICAL
Refills: 0 | Status: DISCONTINUED | OUTPATIENT
Start: 2021-04-28 | End: 2021-05-03

## 2021-04-28 RX ADMIN — Medication 10 MILLIGRAM(S): at 21:45

## 2021-04-28 RX ADMIN — Medication 10 MILLIGRAM(S): at 06:00

## 2021-04-28 RX ADMIN — SODIUM CHLORIDE 500 MILLILITER(S): 9 INJECTION INTRAMUSCULAR; INTRAVENOUS; SUBCUTANEOUS at 02:22

## 2021-04-28 RX ADMIN — HYDROMORPHONE HYDROCHLORIDE 2 MILLIGRAM(S): 2 INJECTION INTRAMUSCULAR; INTRAVENOUS; SUBCUTANEOUS at 11:01

## 2021-04-28 RX ADMIN — METHOCARBAMOL 500 MILLIGRAM(S): 500 TABLET, FILM COATED ORAL at 21:45

## 2021-04-28 RX ADMIN — Medication 50 GRAM(S): at 07:37

## 2021-04-28 RX ADMIN — Medication 1 MILLIGRAM(S): at 12:07

## 2021-04-28 RX ADMIN — HYDROMORPHONE HYDROCHLORIDE 2 MILLIGRAM(S): 2 INJECTION INTRAMUSCULAR; INTRAVENOUS; SUBCUTANEOUS at 06:30

## 2021-04-28 RX ADMIN — Medication 1 MILLIGRAM(S): at 01:19

## 2021-04-28 RX ADMIN — Medication 1 MILLIGRAM(S): at 21:44

## 2021-04-28 RX ADMIN — FAMOTIDINE 20 MILLIGRAM(S): 10 INJECTION INTRAVENOUS at 17:25

## 2021-04-28 RX ADMIN — HYDROMORPHONE HYDROCHLORIDE 2 MILLIGRAM(S): 2 INJECTION INTRAMUSCULAR; INTRAVENOUS; SUBCUTANEOUS at 11:20

## 2021-04-28 RX ADMIN — Medication 10 MILLIGRAM(S): at 13:51

## 2021-04-28 RX ADMIN — HYDROMORPHONE HYDROCHLORIDE 2 MILLIGRAM(S): 2 INJECTION INTRAMUSCULAR; INTRAVENOUS; SUBCUTANEOUS at 20:26

## 2021-04-28 RX ADMIN — FAMOTIDINE 20 MILLIGRAM(S): 10 INJECTION INTRAVENOUS at 06:00

## 2021-04-28 RX ADMIN — METHOCARBAMOL 500 MILLIGRAM(S): 500 TABLET, FILM COATED ORAL at 13:51

## 2021-04-28 RX ADMIN — CLOPIDOGREL BISULFATE 75 MILLIGRAM(S): 75 TABLET, FILM COATED ORAL at 12:07

## 2021-04-28 RX ADMIN — POLYETHYLENE GLYCOL 3350 17 GRAM(S): 17 POWDER, FOR SOLUTION ORAL at 12:08

## 2021-04-28 RX ADMIN — HYDROMORPHONE HYDROCHLORIDE 2 MILLIGRAM(S): 2 INJECTION INTRAMUSCULAR; INTRAVENOUS; SUBCUTANEOUS at 16:50

## 2021-04-28 RX ADMIN — SODIUM CHLORIDE 500 MILLILITER(S): 9 INJECTION INTRAMUSCULAR; INTRAVENOUS; SUBCUTANEOUS at 04:20

## 2021-04-28 RX ADMIN — HYDROMORPHONE HYDROCHLORIDE 2 MILLIGRAM(S): 2 INJECTION INTRAMUSCULAR; INTRAVENOUS; SUBCUTANEOUS at 05:59

## 2021-04-28 RX ADMIN — HYDROMORPHONE HYDROCHLORIDE 2 MILLIGRAM(S): 2 INJECTION INTRAMUSCULAR; INTRAVENOUS; SUBCUTANEOUS at 20:48

## 2021-04-28 RX ADMIN — SODIUM CHLORIDE 20 MILLILITER(S): 9 INJECTION INTRAMUSCULAR; INTRAVENOUS; SUBCUTANEOUS at 02:30

## 2021-04-28 RX ADMIN — METHOCARBAMOL 500 MILLIGRAM(S): 500 TABLET, FILM COATED ORAL at 06:00

## 2021-04-28 RX ADMIN — HYDROMORPHONE HYDROCHLORIDE 2 MILLIGRAM(S): 2 INJECTION INTRAMUSCULAR; INTRAVENOUS; SUBCUTANEOUS at 17:22

## 2021-04-28 RX ADMIN — Medication 250 MILLIGRAM(S): at 11:01

## 2021-04-28 RX ADMIN — CHLORHEXIDINE GLUCONATE 1 APPLICATION(S): 213 SOLUTION TOPICAL at 05:59

## 2021-04-28 NOTE — PROGRESS NOTE ADULT - ASSESSMENT
IMPRESSION:  - Left subclavian vein stenosis s/p elective angioplasty with stent 4/27/21  - S/P AICD/PPM and leads extraction and replacement on 4/27/21  - Hypotension post-procedure  - History of long-QT syndrome and VT s/p AICD  - History of VTE on Xarelto  - SLE and RA  - COPD    PLAN:    CNS:  - Avoid sedatives    HEENT:   - Oral care    PULMONARY:    - HOB @ 45 degrees  - Continue inhalers and nebs for COPD    CARDIOVASCULAR:  - Monitor in CCU  - Hold BP meds  - No clinical evidence of bleeding - but will get CT chest/abdomen/pelvis r/o hematoma...  - Monitor H/H  - Keep Xarelto on hold for now  - Remove A-line  - 2d echo  - Will resume toprol and Xarelto once BP and hemoglobin stable and bleeding is ruled out  - F/U EP    GI:   - GI prophylaxis.  Feeding     RENAL:   - Follow up lytes.  Correct as needed (keep Mg>2 and K>4)    INFECTIOUS DISEASE:  - On Vancomycin jelena-operatively (ppx)  - Monitor vitals    HEMATOLOGICAL:   - DVT prophylaxis to be resumed once bleeding is ruled out    ENDOCRINE:   - Follow up FS.  Insulin protocol if needed    MUSCULOSKELETAL:  - Increase activity as tolerated    DISPO:  - Monitor in CCU         IMPRESSION:  - Left subclavian vein stenosis s/p elective angioplasty with stent 4/27/21  - S/P AICD/PPM and leads extraction and replacement on 4/27/21  - Hypotension post-procedure  - History of long-QT syndrome and VT s/p AICD  - History of VTE on Xarelto  - SLE and RA  - COPD    PLAN:    CNS:  - Avoid sedatives    HEENT:   - Oral care    PULMONARY:    - HOB @ 45 degrees  - Continue inhalers and nebs for COPD    CARDIOVASCULAR:  - Monitor in CCU  - Hold BP meds  - No clinical evidence of bleeding - but will get CT chest/abdomen/pelvis r/o hematoma...  - Monitor H/H  - Keep Xarelto on hold for now  - Remove A-line  - 2d echo  - Will resume toprol and Xarelto once BP and hemoglobin stable and bleeding is ruled out  - Continue with plavix post left subclavian angioplasty/stent  - F/U EP  - F/U vascular    GI:   - GI prophylaxis.  Feeding     RENAL:   - Follow up lytes.  Correct as needed (keep Mg>2 and K>4)    INFECTIOUS DISEASE:  - On Vancomycin jelena-operatively (ppx)  - Monitor vitals    HEMATOLOGICAL:   - DVT prophylaxis to be resumed once bleeding is ruled out    ENDOCRINE:   - Follow up FS.  Insulin protocol if needed    MUSCULOSKELETAL:  - Increase activity as tolerated  - Resume home meds (baclofen, MS contin...)    DISPO:  - Monitor in CCU

## 2021-04-28 NOTE — PROGRESS NOTE ADULT - ASSESSMENT
Groin checks  Pulse checks   Pain management   Vascular to follow  Groin checks  Pulse checks   Pain management   Vascular no further vascular management needed  vascular sx signing off Groin checks  Pulse checks   Pain management   Vascular no further vascular management needed  vascular sx signing off      Please, have pt follow up in 1 month with Dr. Bryson [phone]

## 2021-04-28 NOTE — PROGRESS NOTE ADULT - SUBJECTIVE AND OBJECTIVE BOX
INTERVAL HPI/OVERNIGHT EVENTS: No acute events overnight, patient c/o pain to surgical site and back but no palpitations, CP or dizziness    MEDICATIONS  (STANDING):  ALBUTerol    90 MICROgram(s) HFA Inhaler 2 Puff(s) Inhalation every 6 hours  chlorhexidine 4% Liquid 1 Application(s) Topical <User Schedule>  clopidogrel Tablet 75 milliGRAM(s) Oral daily  famotidine    Tablet 20 milliGRAM(s) Oral every 12 hours  folic acid 1 milliGRAM(s) Oral daily  ipratropium 17 MICROgram(s) HFA Inhaler 2 Puff(s) Inhalation every 6 hours  methocarbamol 500 milliGRAM(s) Oral every 8 hours  metoclopramide 10 milliGRAM(s) Oral three times a day  metoprolol succinate  milliGRAM(s) Oral two times a day  polyethylene glycol 3350 17 Gram(s) Oral daily  sodium chloride 0.9%. 1000 milliLiter(s) (20 mL/Hr) IV Continuous <Continuous>  vancomycin  IVPB 1000 milliGRAM(s) IV Intermittent every 12 hours    MEDICATIONS  (PRN):  ALPRAZolam 1 milliGRAM(s) Oral three times a day PRN Anxiety  HYDROmorphone  Injectable 2 milliGRAM(s) IV Push every 4 hours PRN Moderate Pain (4 - 6)  zolpidem 5 milliGRAM(s) Oral at bedtime PRN Insomnia  zolpidem 5 milliGRAM(s) Oral at bedtime PRN Insomnia      Allergies    Arava (Anaphylaxis; Angioedema)  Avelox (Other)  Plaquenil (Other)  Vantin (Other (Mild))    Intolerances        REVIEW OF SYSTEMS: No CP, palpitations, dizziness or SOB    Vital Signs Last 24 Hrs  T(C): 37.5 (28 Apr 2021 08:00), Max: 37.5 (28 Apr 2021 08:00)  T(F): 99.5 (28 Apr 2021 08:00), Max: 99.5 (28 Apr 2021 08:00)  HR: 68 (28 Apr 2021 10:00) (60 - 89)  BP: 93/50 (28 Apr 2021 10:00) (80/41 - 126/74)  BP(mean): 67 (28 Apr 2021 10:00) (56 - 94)  RR: 13 (28 Apr 2021 10:00) (11 - 33)  SpO2: 93% (28 Apr 2021 10:00) (93% - 100%)    04-27-21 @ 07:01  -  04-28-21 @ 07:00  --------------------------------------------------------  IN: 1940 mL / OUT: 1460 mL / NET: 480 mL        Physical Exam  GENERAL: In no apparent distress, well nourished, and hydrated.  EYES: EOMI, PERRLA, conjunctiva and sclera clear  ENMT: No tonsillar erythema, exudates, or enlargements; ist mucous membranes, Good dentition, No lesions  NECK: Supple  HEART: Regular rate and rhythm; No murmurs, rubs, or gallops.  PULMONARY: Clear to auscultation and perfusion.  No rales, wheezing, or rhonchi bilaterally.  ABDOMEN: Soft, Nontender, Nondistended; Bowel sounds present  EXTREMITIES:  2+ Peripheral Pulses  SKIN: Dressing over L chest wall, no hematoma  NEUROLOGICAL: Grossly nonfocal    LABS:                        9.2    11.82 )-----------( 133      ( 28 Apr 2021 05:09 )             27.9     04-28    137  |  104  |  11  ----------------------------<  136<H>  4.2   |  24  |  0.8    Ca    8.1<L>      28 Apr 2021 05:09  Mg     1.8     04-28    TPro  4.8<L>  /  Alb  3.3<L>  /  TBili  0.2  /  DBili  x   /  AST  24  /  ALT  9   /  AlkPhos  49  04-28    PT/INR - ( 28 Apr 2021 05:09 )   PT: 13.30 sec;   INR: 1.16 ratio         PTT - ( 28 Apr 2021 05:09 )  PTT:35.4 sec      RADIOLOGY & ADDITIONAL TESTS:

## 2021-04-28 NOTE — PROGRESS NOTE ADULT - SUBJECTIVE AND OBJECTIVE BOX
Vascular Surgery Post Operative Note    Procedure: Status post Venogram extremitysuperior vena cava   Insertion, stent, subclavian vein       Operative Findings:  · Operative Findings	12 x 60 mm Venovo stent inserted in left SC vein-Innominate junction- post dilated to 12 mm.  EP service accessed the axillary artery distal to the stent and passed the new pacemaker wires inside the venovo stent.    Post Operative day #1    Patient resting comfortably no complaints       T(C): 36.2 (21 @ 00:00), Max: 37 (21 @ 20:00)  HR: 62 (21 @ 00:00) (60 - 89)  BP: 89/48 (21 @ 00:00) (89/48 - 126/74)  RR: 11 (21 00:00) (11 - 33)  SpO2: 94% (21 00:00) (94% - 100%)    21 @ 07:01  -  21 @ 00:20  --------------------------------------------------------  IN: 640 mL / OUT: 335 mL / NET: 305 mL        General:Alert and oriented times 3, not in acute distress   chest : left sided incision clean dry intact no hematoma no bleeding   Heart: Regular rate and rhythm, no rubs , murmurs or gallops  Lungs: Clear to auscultation bilaterally, no wheezes, rales, rhonci appreciated  Abdomen: Soft , positive bowel sounds, no tenderness, no distention, no peritoneal signs   Exremites: right Groin- incision clean dry and intact,  warm extremities,  good color, no swelling, motor and sensation , pulses                   10.8   9.87  )-----------( 134      (  @ 18:23 )             33.5                    139   |  106   |  15                 Ca: 8.2    BMP:   ----------------------------< 140    M.6   (21 @ 18:23)             4.1    |  25    | 0.8                Ph: x        LFT:     TPro: 5.1 / Alb: 3.5 / TBili: 0.5 / DBili: x / AST: 26 / ALT: 9 / AlkPhos: 53   (21 @ 18:23)          PT/INR - ( 2021 18:23 )   PT: 13.00 sec;   INR: 1.13 ratio         PTT - ( 2021 18:23 )  PTT:47.7 sec                    Culture - Urine (collected 2021 11:30)  Source: .Urine Clean Catch (Midstream)  Final Report (2021 20:34):    <10,000 CFU/mL Normal Urogenital Dariana

## 2021-04-28 NOTE — PROGRESS NOTE ADULT - SUBJECTIVE AND OBJECTIVE BOX
SUBJECTIVE:    Patient is a 50y old Female who presents with a chief complaint of elective left subclavian vein angioplasty and AICD/leads replacement    Today is hospital day 1d. This morning she is resting comfortably in bed and reports no new issues or overnight events. Has some chronic back pain and pain at the site of surgery. no dizziness, no chest pain, no sob     PAST MEDICAL & SURGICAL HISTORY  Lupus    RA (rheumatoid arthritis)    HTN (hypertension)    CHF (congestive heart failure)    COPD (chronic obstructive pulmonary disease)    DVT (deep venous thrombosis)    Pulmonary embolism    History of laryngeal cancer    S/P appendectomy    S/P cholecystectomy    AICD (automatic cardioverter/defibrillator) present    S/P hysterectomy    History of back surgery    H/O foot surgery    S/P eye surgery      SOCIAL HISTORY:  Negative for smoking/alcohol/drug use.     ALLERGIES:  Arava (Anaphylaxis; Angioedema)  Avelox (Other)  Plaquenil (Other)  Vantin (Other (Mild))    MEDICATIONS:  STANDING MEDICATIONS  ALBUTerol    90 MICROgram(s) HFA Inhaler 2 Puff(s) Inhalation every 6 hours  chlorhexidine 4% Liquid 1 Application(s) Topical <User Schedule>  clopidogrel Tablet 75 milliGRAM(s) Oral daily  famotidine    Tablet 20 milliGRAM(s) Oral every 12 hours  folic acid 1 milliGRAM(s) Oral daily  ipratropium 17 MICROgram(s) HFA Inhaler 2 Puff(s) Inhalation every 6 hours  methocarbamol 500 milliGRAM(s) Oral every 8 hours  metoclopramide 10 milliGRAM(s) Oral three times a day  metoprolol succinate  milliGRAM(s) Oral two times a day  polyethylene glycol 3350 17 Gram(s) Oral daily  sodium chloride 0.9%. 1000 milliLiter(s) IV Continuous <Continuous>  vancomycin  IVPB 1000 milliGRAM(s) IV Intermittent every 12 hours    PRN MEDICATIONS  ALPRAZolam 1 milliGRAM(s) Oral three times a day PRN  HYDROmorphone  Injectable 2 milliGRAM(s) IV Push every 4 hours PRN  zolpidem 5 milliGRAM(s) Oral at bedtime PRN  zolpidem 5 milliGRAM(s) Oral at bedtime PRN    VITALS:   T(F): 99.5  HR: 68  BP: 93/50  RR: 13  SpO2: 93%    LABS:                        9.2    11.82 )-----------( 133      ( 28 Apr 2021 05:09 )             27.9     04-28    137  |  104  |  11  ----------------------------<  136<H>  4.2   |  24  |  0.8    Ca    8.1<L>      28 Apr 2021 05:09  Mg     1.8     04-28    TPro  4.8<L>  /  Alb  3.3<L>  /  TBili  0.2  /  DBili  x   /  AST  24  /  ALT  9   /  AlkPhos  49  04-28    PT/INR - ( 28 Apr 2021 05:09 )   PT: 13.30 sec;   INR: 1.16 ratio         PTT - ( 28 Apr 2021 05:09 )  PTT:35.4 sec    Culture - Urine (collected 26 Apr 2021 11:30)  Source: .Urine Clean Catch (Midstream)  Final Report (27 Apr 2021 20:34):    <10,000 CFU/mL Normal Urogenital Dariana    RADIOLOGY:  CXR: No acute pathology    PHYSICAL EXAM:  GEN: No acute distress  LUNGS: Clear to auscultation bilaterally   HEART: S1/S2 present. RRR.   ABD: Soft, non-tender, non-distended. Bowel sounds present  EXT: left upper extremity edema  NEURO: AAOX3

## 2021-04-28 NOTE — PROGRESS NOTE ADULT - ASSESSMENT
Assessment: 49 yo F with hx of SLE, RA, HF sp AICD for primary prevention with subclavian vein stenosis was admitted for laser lead extraction of ICD leads and device and reimplanted with new RA/RV lead and new ICD device (Medtronic) with stenting of subclavian vein. Patient still with partial old atrial lead in place. POD#1 and feeling well    Impression:  Subclavian Vein Stenosis sp PCI  Laser Lead Extraction of ICD Leads and Device  Reimplant of RA/RV Lead and AICD (Medtronic)  Anemia  HFpEF  SLE  RA    Plan:  - CXR completed  - Interrogation completed, numbers within appropriate range  - Get CT chest/abdomen to r/o areas of bleeding to cause anemia  - Cont tele monitoring  - Finish postop course of vanco (1g Q12h x 2 doses)  - Likely D/C tomorrow

## 2021-04-29 LAB
ANION GAP SERPL CALC-SCNC: 7 MMOL/L — SIGNIFICANT CHANGE UP (ref 7–14)
APPEARANCE UR: CLEAR — SIGNIFICANT CHANGE UP
BACTERIA # UR AUTO: NEGATIVE — SIGNIFICANT CHANGE UP
BILIRUB UR-MCNC: NEGATIVE — SIGNIFICANT CHANGE UP
BUN SERPL-MCNC: 8 MG/DL — LOW (ref 10–20)
CALCIUM SERPL-MCNC: 8.3 MG/DL — LOW (ref 8.5–10.1)
CHLORIDE SERPL-SCNC: 102 MMOL/L — SIGNIFICANT CHANGE UP (ref 98–110)
CO2 SERPL-SCNC: 29 MMOL/L — SIGNIFICANT CHANGE UP (ref 17–32)
COLOR SPEC: SIGNIFICANT CHANGE UP
CREAT SERPL-MCNC: 0.9 MG/DL — SIGNIFICANT CHANGE UP (ref 0.7–1.5)
DIFF PNL FLD: ABNORMAL
EPI CELLS # UR: 0 /HPF — SIGNIFICANT CHANGE UP (ref 0–5)
GLUCOSE SERPL-MCNC: 101 MG/DL — HIGH (ref 70–99)
GLUCOSE UR QL: NEGATIVE — SIGNIFICANT CHANGE UP
HCT VFR BLD CALC: 27.8 % — LOW (ref 37–47)
HGB BLD-MCNC: 9.2 G/DL — LOW (ref 12–16)
HYALINE CASTS # UR AUTO: 1 /LPF — SIGNIFICANT CHANGE UP (ref 0–7)
KETONES UR-MCNC: NEGATIVE — SIGNIFICANT CHANGE UP
LEUKOCYTE ESTERASE UR-ACNC: NEGATIVE — SIGNIFICANT CHANGE UP
MAGNESIUM SERPL-MCNC: 1.6 MG/DL — LOW (ref 1.8–2.4)
MCHC RBC-ENTMCNC: 29.2 PG — SIGNIFICANT CHANGE UP (ref 27–31)
MCHC RBC-ENTMCNC: 33.1 G/DL — SIGNIFICANT CHANGE UP (ref 32–37)
MCV RBC AUTO: 88.3 FL — SIGNIFICANT CHANGE UP (ref 81–99)
NITRITE UR-MCNC: NEGATIVE — SIGNIFICANT CHANGE UP
NRBC # BLD: 0 /100 WBCS — SIGNIFICANT CHANGE UP (ref 0–0)
PH UR: 7 — SIGNIFICANT CHANGE UP (ref 5–8)
PLATELET # BLD AUTO: 104 K/UL — LOW (ref 130–400)
POTASSIUM SERPL-MCNC: 4.1 MMOL/L — SIGNIFICANT CHANGE UP (ref 3.5–5)
POTASSIUM SERPL-SCNC: 4.1 MMOL/L — SIGNIFICANT CHANGE UP (ref 3.5–5)
PROT UR-MCNC: NEGATIVE — SIGNIFICANT CHANGE UP
RBC # BLD: 3.15 M/UL — LOW (ref 4.2–5.4)
RBC # FLD: 13.4 % — SIGNIFICANT CHANGE UP (ref 11.5–14.5)
RBC CASTS # UR COMP ASSIST: 35 /HPF — HIGH (ref 0–4)
SODIUM SERPL-SCNC: 138 MMOL/L — SIGNIFICANT CHANGE UP (ref 135–146)
SP GR SPEC: 1.01 — SIGNIFICANT CHANGE UP (ref 1.01–1.03)
UROBILINOGEN FLD QL: SIGNIFICANT CHANGE UP
WBC # BLD: 9.86 K/UL — SIGNIFICANT CHANGE UP (ref 4.8–10.8)
WBC # FLD AUTO: 9.86 K/UL — SIGNIFICANT CHANGE UP (ref 4.8–10.8)
WBC UR QL: 2 /HPF — SIGNIFICANT CHANGE UP (ref 0–5)

## 2021-04-29 PROCEDURE — 93010 ELECTROCARDIOGRAM REPORT: CPT

## 2021-04-29 PROCEDURE — 71045 X-RAY EXAM CHEST 1 VIEW: CPT | Mod: 26,76

## 2021-04-29 PROCEDURE — 99232 SBSQ HOSP IP/OBS MODERATE 35: CPT

## 2021-04-29 RX ORDER — MORPHINE SULFATE 50 MG/1
60 CAPSULE, EXTENDED RELEASE ORAL
Refills: 0 | Status: DISCONTINUED | OUTPATIENT
Start: 2021-04-29 | End: 2021-04-29

## 2021-04-29 RX ORDER — MORPHINE SULFATE 50 MG/1
2 CAPSULE, EXTENDED RELEASE ORAL ONCE
Refills: 0 | Status: DISCONTINUED | OUTPATIENT
Start: 2021-04-29 | End: 2021-04-29

## 2021-04-29 RX ORDER — MORPHINE SULFATE 50 MG/1
60 CAPSULE, EXTENDED RELEASE ORAL ONCE
Refills: 0 | Status: DISCONTINUED | OUTPATIENT
Start: 2021-04-29 | End: 2021-04-29

## 2021-04-29 RX ORDER — MAGNESIUM SULFATE 500 MG/ML
2 VIAL (ML) INJECTION
Refills: 0 | Status: COMPLETED | OUTPATIENT
Start: 2021-04-29 | End: 2021-04-29

## 2021-04-29 RX ORDER — ACETAMINOPHEN 500 MG
650 TABLET ORAL EVERY 6 HOURS
Refills: 0 | Status: DISCONTINUED | OUTPATIENT
Start: 2021-04-29 | End: 2021-05-03

## 2021-04-29 RX ORDER — METOPROLOL TARTRATE 50 MG
50 TABLET ORAL DAILY
Refills: 0 | Status: DISCONTINUED | OUTPATIENT
Start: 2021-04-29 | End: 2021-05-03

## 2021-04-29 RX ORDER — MORPHINE SULFATE 50 MG/1
30 CAPSULE, EXTENDED RELEASE ORAL EVERY 4 HOURS
Refills: 0 | Status: DISCONTINUED | OUTPATIENT
Start: 2021-04-29 | End: 2021-05-01

## 2021-04-29 RX ORDER — MORPHINE SULFATE 50 MG/1
120 CAPSULE, EXTENDED RELEASE ORAL EVERY 8 HOURS
Refills: 0 | Status: DISCONTINUED | OUTPATIENT
Start: 2021-04-29 | End: 2021-05-03

## 2021-04-29 RX ORDER — MORPHINE SULFATE 50 MG/1
30 CAPSULE, EXTENDED RELEASE ORAL
Refills: 0 | Status: DISCONTINUED | OUTPATIENT
Start: 2021-04-29 | End: 2021-05-02

## 2021-04-29 RX ORDER — ENOXAPARIN SODIUM 100 MG/ML
40 INJECTION SUBCUTANEOUS ONCE
Refills: 0 | Status: COMPLETED | OUTPATIENT
Start: 2021-04-29 | End: 2021-04-29

## 2021-04-29 RX ADMIN — MORPHINE SULFATE 30 MILLIGRAM(S): 50 CAPSULE, EXTENDED RELEASE ORAL at 17:56

## 2021-04-29 RX ADMIN — MORPHINE SULFATE 120 MILLIGRAM(S): 50 CAPSULE, EXTENDED RELEASE ORAL at 14:35

## 2021-04-29 RX ADMIN — METHOCARBAMOL 500 MILLIGRAM(S): 500 TABLET, FILM COATED ORAL at 21:41

## 2021-04-29 RX ADMIN — MORPHINE SULFATE 2 MILLIGRAM(S): 50 CAPSULE, EXTENDED RELEASE ORAL at 10:04

## 2021-04-29 RX ADMIN — Medication 1 MILLIGRAM(S): at 21:54

## 2021-04-29 RX ADMIN — MORPHINE SULFATE 120 MILLIGRAM(S): 50 CAPSULE, EXTENDED RELEASE ORAL at 13:56

## 2021-04-29 RX ADMIN — FAMOTIDINE 20 MILLIGRAM(S): 10 INJECTION INTRAVENOUS at 05:15

## 2021-04-29 RX ADMIN — HYDROMORPHONE HYDROCHLORIDE 2 MILLIGRAM(S): 2 INJECTION INTRAMUSCULAR; INTRAVENOUS; SUBCUTANEOUS at 06:20

## 2021-04-29 RX ADMIN — CHLORHEXIDINE GLUCONATE 1 APPLICATION(S): 213 SOLUTION TOPICAL at 05:15

## 2021-04-29 RX ADMIN — Medication 1 MILLIGRAM(S): at 13:12

## 2021-04-29 RX ADMIN — Medication 50 GRAM(S): at 07:56

## 2021-04-29 RX ADMIN — HYDROMORPHONE HYDROCHLORIDE 2 MILLIGRAM(S): 2 INJECTION INTRAMUSCULAR; INTRAVENOUS; SUBCUTANEOUS at 05:36

## 2021-04-29 RX ADMIN — Medication 650 MILLIGRAM(S): at 23:21

## 2021-04-29 RX ADMIN — Medication 650 MILLIGRAM(S): at 21:44

## 2021-04-29 RX ADMIN — MORPHINE SULFATE 30 MILLIGRAM(S): 50 CAPSULE, EXTENDED RELEASE ORAL at 19:11

## 2021-04-29 RX ADMIN — METHOCARBAMOL 500 MILLIGRAM(S): 500 TABLET, FILM COATED ORAL at 13:13

## 2021-04-29 RX ADMIN — Medication 10 MILLIGRAM(S): at 05:16

## 2021-04-29 RX ADMIN — Medication 50 GRAM(S): at 09:32

## 2021-04-29 RX ADMIN — ENOXAPARIN SODIUM 40 MILLIGRAM(S): 100 INJECTION SUBCUTANEOUS at 11:38

## 2021-04-29 RX ADMIN — MORPHINE SULFATE 2 MILLIGRAM(S): 50 CAPSULE, EXTENDED RELEASE ORAL at 10:16

## 2021-04-29 RX ADMIN — SODIUM CHLORIDE 20 MILLILITER(S): 9 INJECTION INTRAMUSCULAR; INTRAVENOUS; SUBCUTANEOUS at 20:24

## 2021-04-29 RX ADMIN — MORPHINE SULFATE 120 MILLIGRAM(S): 50 CAPSULE, EXTENDED RELEASE ORAL at 21:25

## 2021-04-29 RX ADMIN — FAMOTIDINE 20 MILLIGRAM(S): 10 INJECTION INTRAVENOUS at 17:58

## 2021-04-29 RX ADMIN — CLOPIDOGREL BISULFATE 75 MILLIGRAM(S): 75 TABLET, FILM COATED ORAL at 11:33

## 2021-04-29 RX ADMIN — MORPHINE SULFATE 120 MILLIGRAM(S): 50 CAPSULE, EXTENDED RELEASE ORAL at 21:55

## 2021-04-29 RX ADMIN — Medication 650 MILLIGRAM(S): at 06:20

## 2021-04-29 RX ADMIN — Medication 50 MILLIGRAM(S): at 10:04

## 2021-04-29 RX ADMIN — METHOCARBAMOL 500 MILLIGRAM(S): 500 TABLET, FILM COATED ORAL at 05:16

## 2021-04-29 RX ADMIN — HYDROMORPHONE HYDROCHLORIDE 2 MILLIGRAM(S): 2 INJECTION INTRAMUSCULAR; INTRAVENOUS; SUBCUTANEOUS at 00:43

## 2021-04-29 RX ADMIN — Medication 1 MILLIGRAM(S): at 11:33

## 2021-04-29 RX ADMIN — MORPHINE SULFATE 30 MILLIGRAM(S): 50 CAPSULE, EXTENDED RELEASE ORAL at 10:52

## 2021-04-29 RX ADMIN — HYDROMORPHONE HYDROCHLORIDE 2 MILLIGRAM(S): 2 INJECTION INTRAMUSCULAR; INTRAVENOUS; SUBCUTANEOUS at 05:21

## 2021-04-29 RX ADMIN — MORPHINE SULFATE 30 MILLIGRAM(S): 50 CAPSULE, EXTENDED RELEASE ORAL at 10:22

## 2021-04-29 RX ADMIN — Medication 10 MILLIGRAM(S): at 21:41

## 2021-04-29 RX ADMIN — Medication 10 MILLIGRAM(S): at 13:13

## 2021-04-29 RX ADMIN — Medication 650 MILLIGRAM(S): at 00:52

## 2021-04-29 NOTE — PROGRESS NOTE ADULT - SUBJECTIVE AND OBJECTIVE BOX
INTERVAL HPI/OVERNIGHT EVENTS: No acute events overnight    MEDICATIONS  (STANDING):  ALBUTerol    90 MICROgram(s) HFA Inhaler 2 Puff(s) Inhalation every 6 hours  chlorhexidine 4% Liquid 1 Application(s) Topical <User Schedule>  clopidogrel Tablet 75 milliGRAM(s) Oral daily  famotidine    Tablet 20 milliGRAM(s) Oral every 12 hours  folic acid 1 milliGRAM(s) Oral daily  ipratropium 17 MICROgram(s) HFA Inhaler 2 Puff(s) Inhalation every 6 hours  methocarbamol 500 milliGRAM(s) Oral every 8 hours  metoclopramide 10 milliGRAM(s) Oral three times a day  metoprolol succinate  milliGRAM(s) Oral two times a day  polyethylene glycol 3350 17 Gram(s) Oral daily  sodium chloride 0.9%. 1000 milliLiter(s) (20 mL/Hr) IV Continuous <Continuous>  vancomycin  IVPB 1000 milliGRAM(s) IV Intermittent every 12 hours    MEDICATIONS  (PRN):  ALPRAZolam 1 milliGRAM(s) Oral three times a day PRN Anxiety  HYDROmorphone  Injectable 2 milliGRAM(s) IV Push every 4 hours PRN Moderate Pain (4 - 6)  zolpidem 5 milliGRAM(s) Oral at bedtime PRN Insomnia  zolpidem 5 milliGRAM(s) Oral at bedtime PRN Insomnia      Allergies    Arava (Anaphylaxis; Angioedema)  Avelox (Other)  Plaquenil (Other)  Vantin (Other (Mild))    Intolerances        REVIEW OF SYSTEMS: No CP, palpitations, dizziness or SOB    Vital Signs Last 24 Hrs  T(C): 37.5 (28 Apr 2021 08:00), Max: 37.5 (28 Apr 2021 08:00)  T(F): 99.5 (28 Apr 2021 08:00), Max: 99.5 (28 Apr 2021 08:00)  HR: 68 (28 Apr 2021 10:00) (60 - 89)  BP: 93/50 (28 Apr 2021 10:00) (80/41 - 126/74)  BP(mean): 67 (28 Apr 2021 10:00) (56 - 94)  RR: 13 (28 Apr 2021 10:00) (11 - 33)  SpO2: 93% (28 Apr 2021 10:00) (93% - 100%)    04-27-21 @ 07:01  -  04-28-21 @ 07:00  --------------------------------------------------------  IN: 1940 mL / OUT: 1460 mL / NET: 480 mL        Physical Exam  GENERAL: In no apparent distress, well nourished, and hydrated.  EYES: EOMI, PERRLA, conjunctiva and sclera clear  ENMT: No tonsillar erythema, exudates, or enlargements; ist mucous membranes, Good dentition, No lesions  NECK: Supple  HEART: Regular rate and rhythm; No murmurs, rubs, or gallops.  PULMONARY: Clear to auscultation and perfusion.  No rales, wheezing, or rhonchi bilaterally.  ABDOMEN: Soft, Nontender, Nondistended; Bowel sounds present  EXTREMITIES:  2+ Peripheral Pulses  SKIN: Dressing over L chest wall, no hematoma  NEUROLOGICAL: Grossly nonfocal    LABS:                        9.2    11.82 )-----------( 133      ( 28 Apr 2021 05:09 )             27.9     04-28    137  |  104  |  11  ----------------------------<  136<H>  4.2   |  24  |  0.8    Ca    8.1<L>      28 Apr 2021 05:09  Mg     1.8     04-28    TPro  4.8<L>  /  Alb  3.3<L>  /  TBili  0.2  /  DBili  x   /  AST  24  /  ALT  9   /  AlkPhos  49  04-28    PT/INR - ( 28 Apr 2021 05:09 )   PT: 13.30 sec;   INR: 1.16 ratio         PTT - ( 28 Apr 2021 05:09 )  PTT:35.4 sec      RADIOLOGY & ADDITIONAL TESTS:

## 2021-04-29 NOTE — PROGRESS NOTE ADULT - ASSESSMENT
IMPRESSION:  - Left subclavian vein stenosis s/p elective angioplasty with stent 4/27/21  - S/P AICD/PPM and leads extraction and replacement on 4/27/21  - Hypotension post-procedure - resolved  - History of long-QT syndrome and VT s/p AICD  - History of VTE on Xarelto  - SLE and RA  - COPD    PLAN:    CNS:  - Avoid sedatives    HEENT:   - Oral care    PULMONARY:    - HOB @ 45 degrees  - Continue inhalers and nebs for COPD    CARDIOVASCULAR:  - Monitor in CCU  - Hold BP meds  - No clinical evidence of bleeding - CT chest/abdomen/pelvis without significant hematoma (small subcutaneous hematoma at the surgery site)  - Monitor H/H - stable  - Keep Xarelto on hold for now - to be resumed tomorrow as per EP  - Removed A-line  - Will resume toprol half of home dose today (Toprol 50mg daily) - increase to home dose upon discharge  - Continue with plavix post left subclavian angioplasty/stent  - F/U EP  - F/U vascular    GI:   - GI prophylaxis.  Feeding     RENAL:   - Follow up lytes.  Correct as needed (keep Mg>2 and K>4)    INFECTIOUS DISEASE:  - On Vancomycin jelena-operatively (ppx)  - Monitor vitals  - Low grade fever overnight - will send pancultures and monitor    HEMATOLOGICAL:   - DVT prophylaxis (lovenox 40mg s/c once today)  - Monitor H/H  - Resume Xarelto tomorrow    ENDOCRINE:   - Follow up FS.  Insulin protocol if needed    MUSCULOSKELETAL:  - Increase activity as tolerated  - Resume home meds (baclofen, MS contin...)    DISPO:  - Transfer to telemetry  - Anticipate discharge home tomorrow

## 2021-04-29 NOTE — PROGRESS NOTE ADULT - SUBJECTIVE AND OBJECTIVE BOX
SUBJECTIVE:  Patient is a 50y old Female who presents with a chief complaint of elective left subclavian vein angioplasty and AICD/leads replacement    Today is hospital day 2d. This morning she is resting comfortably in bed and reports no new issues or overnight events. Has some chronic back pain and pain at the site of surgery. no dizziness, no chest pain, no sob. had an episode of low grade fever overnight    PAST MEDICAL & SURGICAL HISTORY  Lupus    RA (rheumatoid arthritis)    HTN (hypertension)    CHF (congestive heart failure)    COPD (chronic obstructive pulmonary disease)    DVT (deep venous thrombosis)    Pulmonary embolism    History of laryngeal cancer    S/P appendectomy    S/P cholecystectomy    AICD (automatic cardioverter/defibrillator) present    S/P hysterectomy    History of back surgery    H/O foot surgery    S/P eye surgery      SOCIAL HISTORY:  Negative for smoking/alcohol/drug use.     ALLERGIES:  Arava (Anaphylaxis; Angioedema)  Avelox (Other)  Plaquenil (Other)  Vantin (Other (Mild))    MEDICATIONS:  STANDING MEDICATIONS  ALBUTerol    90 MICROgram(s) HFA Inhaler 2 Puff(s) Inhalation every 6 hours  chlorhexidine 4% Liquid 1 Application(s) Topical <User Schedule>  clopidogrel Tablet 75 milliGRAM(s) Oral daily  famotidine    Tablet 20 milliGRAM(s) Oral every 12 hours  folic acid 1 milliGRAM(s) Oral daily  ipratropium 17 MICROgram(s) HFA Inhaler 2 Puff(s) Inhalation every 6 hours  methocarbamol 500 milliGRAM(s) Oral every 8 hours  metoclopramide 10 milliGRAM(s) Oral three times a day  metoprolol succinate ER 50 milliGRAM(s) Oral daily  morphine ER Tablet 30 milliGRAM(s) Oral <User Schedule>  polyethylene glycol 3350 17 Gram(s) Oral daily  sodium chloride 0.9%. 1000 milliLiter(s) IV Continuous <Continuous>    PRN MEDICATIONS  acetaminophen   Tablet .. 650 milliGRAM(s) Oral every 6 hours PRN  ALPRAZolam 1 milliGRAM(s) Oral three times a day PRN  morphine  IR 30 milliGRAM(s) Oral every 4 hours PRN  zolpidem 5 milliGRAM(s) Oral at bedtime PRN  zolpidem 5 milliGRAM(s) Oral at bedtime PRN    VITALS:   T(F): 98.9  HR: 82  BP: 155/75  RR: 27  SpO2: 94%    LABS:                        9.2    9.86  )-----------( 104      ( 2021 05:18 )             27.8     04    138  |  102  |  8<L>  ----------------------------<  101<H>  4.1   |  29  |  0.9    Ca    8.3<L>      2021 05:18  Mg     1.6         TPro  4.8<L>  /  Alb  3.3<L>  /  TBili  0.2  /  DBili  x   /  AST  24  /  ALT  9   /  AlkPhos  49  -    PT/INR - ( 2021 05:09 )   PT: 13.30 sec;   INR: 1.16 ratio         PTT - ( 2021 05:09 )  PTT:35.4 sec  Urinalysis Basic - ( 2021 06:30 )    Color: Light Yellow / Appearance: Clear / S.012 / pH: x  Gluc: x / Ketone: Negative  / Bili: Negative / Urobili: <2 mg/dL   Blood: x / Protein: Negative / Nitrite: Negative   Leuk Esterase: Negative / RBC: 35 /HPF / WBC 2 /HPF   Sq Epi: x / Non Sq Epi: 0 /HPF / Bacteria: Negative    Culture - Urine (collected 2021 11:30)  Source: .Urine Clean Catch (Midstream)  Final Report (2021 20:34):    <10,000 CFU/mL Normal Urogenital Dariana      RADIOLOGY:  CXR: left lower opacity    PHYSICAL EXAM:  GEN: No acute distress  LUNGS: Clear to auscultation bilaterally   HEART: S1/S2 present. RRR.   ABD: Soft, non-tender, non-distended. Bowel sounds present  EXT: NC/NC/NE/2+PP/HUNTER  NEURO: AAOX3     SUBJECTIVE:  Patient is a 50y old Female who presents with a chief complaint of elective left subclavian vein angioplasty and AICD/leads replacement    Today is hospital day 2d. This morning she is resting comfortably in bed and reports no new issues or overnight events. Has some chronic back pain and pain at the site of surgery. no dizziness, no chest pain, no sob. had an episode of low grade fever overnight    PAST MEDICAL & SURGICAL HISTORY  Lupus    RA (rheumatoid arthritis)    HTN (hypertension)    CHF (congestive heart failure)    COPD (chronic obstructive pulmonary disease)    DVT (deep venous thrombosis)    Pulmonary embolism    History of laryngeal cancer    S/P appendectomy    S/P cholecystectomy    AICD (automatic cardioverter/defibrillator) present    S/P hysterectomy    History of back surgery    H/O foot surgery    S/P eye surgery      SOCIAL HISTORY:  Negative for smoking/alcohol/drug use.     ALLERGIES:  Arava (Anaphylaxis; Angioedema)  Avelox (Other)  Plaquenil (Other)  Vantin (Other (Mild))    MEDICATIONS:  STANDING MEDICATIONS  ALBUTerol    90 MICROgram(s) HFA Inhaler 2 Puff(s) Inhalation every 6 hours  chlorhexidine 4% Liquid 1 Application(s) Topical <User Schedule>  clopidogrel Tablet 75 milliGRAM(s) Oral daily  famotidine    Tablet 20 milliGRAM(s) Oral every 12 hours  folic acid 1 milliGRAM(s) Oral daily  ipratropium 17 MICROgram(s) HFA Inhaler 2 Puff(s) Inhalation every 6 hours  methocarbamol 500 milliGRAM(s) Oral every 8 hours  metoclopramide 10 milliGRAM(s) Oral three times a day  metoprolol succinate ER 50 milliGRAM(s) Oral daily  morphine ER Tablet 30 milliGRAM(s) Oral <User Schedule>  polyethylene glycol 3350 17 Gram(s) Oral daily  sodium chloride 0.9%. 1000 milliLiter(s) IV Continuous <Continuous>    PRN MEDICATIONS  acetaminophen   Tablet .. 650 milliGRAM(s) Oral every 6 hours PRN  ALPRAZolam 1 milliGRAM(s) Oral three times a day PRN  morphine  IR 30 milliGRAM(s) Oral every 4 hours PRN  zolpidem 5 milliGRAM(s) Oral at bedtime PRN  zolpidem 5 milliGRAM(s) Oral at bedtime PRN    VITALS:   T(F): 98.9  HR: 82  BP: 155/75  RR: 27  SpO2: 94%    LABS:                        9.2    9.86  )-----------( 104      ( 2021 05:18 )             27.8     04-    138  |  102  |  8<L>  ----------------------------<  101<H>  4.1   |  29  |  0.9    Ca    8.3<L>      2021 05:18  Mg     1.6         TPro  4.8<L>  /  Alb  3.3<L>  /  TBili  0.2  /  DBili  x   /  AST  24  /  ALT  9   /  AlkPhos  49  -    PT/INR - ( 2021 05:09 )   PT: 13.30 sec;   INR: 1.16 ratio         PTT - ( 2021 05:09 )  PTT:35.4 sec  Urinalysis Basic - ( 2021 06:30 )    Color: Light Yellow / Appearance: Clear / S.012 / pH: x  Gluc: x / Ketone: Negative  / Bili: Negative / Urobili: <2 mg/dL   Blood: x / Protein: Negative / Nitrite: Negative   Leuk Esterase: Negative / RBC: 35 /HPF / WBC 2 /HPF   Sq Epi: x / Non Sq Epi: 0 /HPF / Bacteria: Negative    Culture - Urine (collected 2021 11:30)  Source: .Urine Clean Catch (Midstream)  Final Report (2021 20:34):    <10,000 CFU/mL Normal Urogenital Dariana      RADIOLOGY:  CXR: left lower opacity    PHYSICAL EXAM:  GEN: No acute distress  LUNGS: Clear to auscultation bilaterally left ICD  HEART: S1/S2 present. RRR. (-) rub  ABD: Soft, non-tender, non-distended. Bowel sounds present  EXT: NC/NC/NE/2+PP/HUNTER  NEURO: AAOX3

## 2021-04-29 NOTE — CHART NOTE - NSCHARTNOTEFT_GEN_A_CORE
CCU Transfer Note    Transfer from: CCU  Transfer to: 3C  Accepting physican:      SICU COURSE:  Pt with Left subclavian vein stenosis s/p elective angioplasty with stent 4/27/21, S/P AICD/PPM and leads extraction and replacement on 4/27/21. Pt sent to CCU for observation post procedure. Hypotension post-procedure - resolved s/p 250ml NS bolus x 2.      History of long-QT syndrome and VT s/p AICD  History of VTE on Xarelto  SLE and RA  COPD        ASSESSMENT & PLAN:     - Hold BP meds  - No clinical evidence of bleeding - CT chest/abdomen/pelvis without significant hematoma (small subcutaneous hematoma at the surgery site)  - Monitor H/H - stable  - Keep Xarelto on hold for now - to be resumed tomorrow as per EP  - Will resume toprol half of home dose today (Toprol 50mg daily) - increase to home dose upon discharge  - Continue with plavix post left subclavian angioplasty/stent  - F/U EP recs  - F/U vascular recs      INFECTIOUS DISEASE:  - s/p Vancomycin jelena-operatively (ppx)  - Monitor vitals  - Low grade fever overnight     For Follow-Up:    f/u pancultures       Vital Signs Last 24 Hrs  T(C): 37.5 (29 Apr 2021 12:00), Max: 38.3 (29 Apr 2021 00:00)  T(F): 99.5 (29 Apr 2021 12:00), Max: 100.9 (29 Apr 2021 00:00)  HR: 84 (29 Apr 2021 14:00) (70 - 96)  BP: 133/65 (29 Apr 2021 14:00) (87/48 - 155/75)  BP(mean): 87 (29 Apr 2021 14:00) (56 - 116)  RR: 22 (29 Apr 2021 14:00) (18 - 47)  SpO2: 95% (29 Apr 2021 14:00) (91% - 95%)  I&O's Summary    28 Apr 2021 07:01  -  29 Apr 2021 07:00  --------------------------------------------------------  IN: 970 mL / OUT: 1910 mL / NET: -940 mL    29 Apr 2021 07:01  -  29 Apr 2021 15:12  --------------------------------------------------------  IN: 440 mL / OUT: 0 mL / NET: 440 mL          MEDICATIONS  (STANDING):  ALBUTerol    90 MICROgram(s) HFA Inhaler 2 Puff(s) Inhalation every 6 hours  chlorhexidine 4% Liquid 1 Application(s) Topical <User Schedule>  clopidogrel Tablet 75 milliGRAM(s) Oral daily  famotidine    Tablet 20 milliGRAM(s) Oral every 12 hours  folic acid 1 milliGRAM(s) Oral daily  ipratropium 17 MICROgram(s) HFA Inhaler 2 Puff(s) Inhalation every 6 hours  methocarbamol 500 milliGRAM(s) Oral every 8 hours  metoclopramide 10 milliGRAM(s) Oral three times a day  metoprolol succinate ER 50 milliGRAM(s) Oral daily  morphine ER Tablet 120 milliGRAM(s) Oral every 8 hours  morphine ER Tablet 30 milliGRAM(s) Oral <User Schedule>  polyethylene glycol 3350 17 Gram(s) Oral daily  sodium chloride 0.9%. 1000 milliLiter(s) (20 mL/Hr) IV Continuous <Continuous>    MEDICATIONS  (PRN):  acetaminophen   Tablet .. 650 milliGRAM(s) Oral every 6 hours PRN Temp greater or equal to 38C (100.4F), Moderate Pain (4 - 6)  ALPRAZolam 1 milliGRAM(s) Oral three times a day PRN Anxiety  morphine  IR 30 milliGRAM(s) Oral every 4 hours PRN Severe Pain (7 - 10)  zolpidem 5 milliGRAM(s) Oral at bedtime PRN Insomnia  zolpidem 5 milliGRAM(s) Oral at bedtime PRN Insomnia        LABS                                            9.2                   Neurophils% (auto):   x      (04-29 @ 05:18):    9.86 )-----------(104          Lymphocytes% (auto):  x                                             27.8                   Eosinphils% (auto):   x        Manual%: Neutrophils x    ; Lymphocytes x    ; Eosinophils x    ; Bands%: x    ; Blasts x                                    138    |  102    |  8                   Calcium: 8.3   / iCa: x      (04-29 @ 05:18)    ----------------------------<  101       Magnesium: 1.6                              4.1     |  29     |  0.9              Phosphorous: x

## 2021-04-29 NOTE — PROGRESS NOTE ADULT - ASSESSMENT
Assessment: 51 yo F with hx of SLE, RA, HF sp AICD for primary prevention with subclavian vein stenosis was admitted for laser lead extraction of ICD leads and device and reimplanted with new RA/RV lead and new ICD device (Medtronic) with stenting of subclavian vein. Patient still with partial old atrial lead in place. POD#2 and feeling well    Impression:  Subclavian Vein Stenosis sp PCI  Laser Lead Extraction of ICD Leads and Device  Reimplant of RA/RV Lead and AICD (Medtronic)  Anemia  HFpEF  SLE  RA    Plan:  - CT chest/abdomen reviewed  - Cont tele monitoring  - Low grade fever overnight, BCx pending  - Monitor electrolytes, maintain WNL  - Will cont to monitor and anticipate DC in AM

## 2021-04-30 LAB
ALBUMIN SERPL ELPH-MCNC: 3.7 G/DL — SIGNIFICANT CHANGE UP (ref 3.5–5.2)
ALP SERPL-CCNC: 59 U/L — SIGNIFICANT CHANGE UP (ref 30–115)
ALT FLD-CCNC: 8 U/L — SIGNIFICANT CHANGE UP (ref 0–41)
ANION GAP SERPL CALC-SCNC: 13 MMOL/L — SIGNIFICANT CHANGE UP (ref 7–14)
APPEARANCE UR: CLEAR — SIGNIFICANT CHANGE UP
AST SERPL-CCNC: 16 U/L — SIGNIFICANT CHANGE UP (ref 0–41)
BACTERIA # UR AUTO: NEGATIVE — SIGNIFICANT CHANGE UP
BILIRUB SERPL-MCNC: 0.7 MG/DL — SIGNIFICANT CHANGE UP (ref 0.2–1.2)
BILIRUB UR-MCNC: NEGATIVE — SIGNIFICANT CHANGE UP
BUN SERPL-MCNC: 7 MG/DL — LOW (ref 10–20)
CALCIUM SERPL-MCNC: 8.8 MG/DL — SIGNIFICANT CHANGE UP (ref 8.5–10.1)
CHLORIDE SERPL-SCNC: 101 MMOL/L — SIGNIFICANT CHANGE UP (ref 98–110)
CO2 SERPL-SCNC: 24 MMOL/L — SIGNIFICANT CHANGE UP (ref 17–32)
COLOR SPEC: SIGNIFICANT CHANGE UP
CREAT SERPL-MCNC: 0.9 MG/DL — SIGNIFICANT CHANGE UP (ref 0.7–1.5)
DIFF PNL FLD: ABNORMAL
EPI CELLS # UR: 3 /HPF — SIGNIFICANT CHANGE UP (ref 0–5)
GLUCOSE SERPL-MCNC: 102 MG/DL — HIGH (ref 70–99)
GLUCOSE UR QL: NEGATIVE — SIGNIFICANT CHANGE UP
HCT VFR BLD CALC: 29.6 % — LOW (ref 37–47)
HGB BLD-MCNC: 9.9 G/DL — LOW (ref 12–16)
HYALINE CASTS # UR AUTO: 1 /LPF — SIGNIFICANT CHANGE UP (ref 0–7)
KETONES UR-MCNC: NEGATIVE — SIGNIFICANT CHANGE UP
LEUKOCYTE ESTERASE UR-ACNC: ABNORMAL
MAGNESIUM SERPL-MCNC: 1.7 MG/DL — LOW (ref 1.8–2.4)
MCHC RBC-ENTMCNC: 29.6 PG — SIGNIFICANT CHANGE UP (ref 27–31)
MCHC RBC-ENTMCNC: 33.4 G/DL — SIGNIFICANT CHANGE UP (ref 32–37)
MCV RBC AUTO: 88.6 FL — SIGNIFICANT CHANGE UP (ref 81–99)
NITRITE UR-MCNC: NEGATIVE — SIGNIFICANT CHANGE UP
NRBC # BLD: 0 /100 WBCS — SIGNIFICANT CHANGE UP (ref 0–0)
PH UR: 6.5 — SIGNIFICANT CHANGE UP (ref 5–8)
PLATELET # BLD AUTO: 101 K/UL — LOW (ref 130–400)
POTASSIUM SERPL-MCNC: 3.6 MMOL/L — SIGNIFICANT CHANGE UP (ref 3.5–5)
POTASSIUM SERPL-SCNC: 3.6 MMOL/L — SIGNIFICANT CHANGE UP (ref 3.5–5)
PROT SERPL-MCNC: 5.6 G/DL — LOW (ref 6–8)
PROT UR-MCNC: NEGATIVE — SIGNIFICANT CHANGE UP
RBC # BLD: 3.34 M/UL — LOW (ref 4.2–5.4)
RBC # FLD: 13.2 % — SIGNIFICANT CHANGE UP (ref 11.5–14.5)
RBC CASTS # UR COMP ASSIST: 6 /HPF — HIGH (ref 0–4)
SODIUM SERPL-SCNC: 138 MMOL/L — SIGNIFICANT CHANGE UP (ref 135–146)
SP GR SPEC: 1.01 — SIGNIFICANT CHANGE UP (ref 1.01–1.03)
UROBILINOGEN FLD QL: SIGNIFICANT CHANGE UP
WBC # BLD: 9.24 K/UL — SIGNIFICANT CHANGE UP (ref 4.8–10.8)
WBC # FLD AUTO: 9.24 K/UL — SIGNIFICANT CHANGE UP (ref 4.8–10.8)
WBC UR QL: 6 /HPF — HIGH (ref 0–5)

## 2021-04-30 PROCEDURE — 99232 SBSQ HOSP IP/OBS MODERATE 35: CPT

## 2021-04-30 PROCEDURE — 71045 X-RAY EXAM CHEST 1 VIEW: CPT | Mod: 26

## 2021-04-30 PROCEDURE — 93010 ELECTROCARDIOGRAM REPORT: CPT

## 2021-04-30 PROCEDURE — 99233 SBSQ HOSP IP/OBS HIGH 50: CPT

## 2021-04-30 RX ORDER — MAGNESIUM SULFATE 500 MG/ML
2 VIAL (ML) INJECTION ONCE
Refills: 0 | Status: COMPLETED | OUTPATIENT
Start: 2021-04-30 | End: 2021-04-30

## 2021-04-30 RX ORDER — RIVAROXABAN 15 MG-20MG
20 KIT ORAL
Refills: 0 | Status: DISCONTINUED | OUTPATIENT
Start: 2021-04-30 | End: 2021-05-03

## 2021-04-30 RX ORDER — AZTREONAM 2 G
2000 VIAL (EA) INJECTION EVERY 8 HOURS
Refills: 0 | Status: DISCONTINUED | OUTPATIENT
Start: 2021-04-30 | End: 2021-05-03

## 2021-04-30 RX ADMIN — RIVAROXABAN 20 MILLIGRAM(S): KIT at 17:18

## 2021-04-30 RX ADMIN — FAMOTIDINE 20 MILLIGRAM(S): 10 INJECTION INTRAVENOUS at 05:55

## 2021-04-30 RX ADMIN — MORPHINE SULFATE 120 MILLIGRAM(S): 50 CAPSULE, EXTENDED RELEASE ORAL at 06:23

## 2021-04-30 RX ADMIN — POLYETHYLENE GLYCOL 3350 17 GRAM(S): 17 POWDER, FOR SOLUTION ORAL at 11:14

## 2021-04-30 RX ADMIN — MORPHINE SULFATE 30 MILLIGRAM(S): 50 CAPSULE, EXTENDED RELEASE ORAL at 03:20

## 2021-04-30 RX ADMIN — ALBUTEROL 2 PUFF(S): 90 AEROSOL, METERED ORAL at 21:19

## 2021-04-30 RX ADMIN — FAMOTIDINE 20 MILLIGRAM(S): 10 INJECTION INTRAVENOUS at 17:18

## 2021-04-30 RX ADMIN — MORPHINE SULFATE 120 MILLIGRAM(S): 50 CAPSULE, EXTENDED RELEASE ORAL at 05:53

## 2021-04-30 RX ADMIN — METHOCARBAMOL 500 MILLIGRAM(S): 500 TABLET, FILM COATED ORAL at 21:41

## 2021-04-30 RX ADMIN — Medication 110 MILLIGRAM(S): at 17:18

## 2021-04-30 RX ADMIN — Medication 1 MILLIGRAM(S): at 11:14

## 2021-04-30 RX ADMIN — MORPHINE SULFATE 30 MILLIGRAM(S): 50 CAPSULE, EXTENDED RELEASE ORAL at 11:14

## 2021-04-30 RX ADMIN — Medication 650 MILLIGRAM(S): at 11:14

## 2021-04-30 RX ADMIN — MORPHINE SULFATE 30 MILLIGRAM(S): 50 CAPSULE, EXTENDED RELEASE ORAL at 12:03

## 2021-04-30 RX ADMIN — METHOCARBAMOL 500 MILLIGRAM(S): 500 TABLET, FILM COATED ORAL at 05:54

## 2021-04-30 RX ADMIN — MORPHINE SULFATE 30 MILLIGRAM(S): 50 CAPSULE, EXTENDED RELEASE ORAL at 02:50

## 2021-04-30 RX ADMIN — CLOPIDOGREL BISULFATE 75 MILLIGRAM(S): 75 TABLET, FILM COATED ORAL at 11:15

## 2021-04-30 RX ADMIN — METHOCARBAMOL 500 MILLIGRAM(S): 500 TABLET, FILM COATED ORAL at 13:02

## 2021-04-30 RX ADMIN — Medication 50 MILLIGRAM(S): at 05:55

## 2021-04-30 RX ADMIN — Medication 10 MILLIGRAM(S): at 05:54

## 2021-04-30 RX ADMIN — Medication 10 MILLIGRAM(S): at 13:02

## 2021-04-30 RX ADMIN — Medication 100 MILLIGRAM(S): at 21:41

## 2021-04-30 RX ADMIN — Medication 100 MILLIGRAM(S): at 16:10

## 2021-04-30 RX ADMIN — Medication 10 MILLIGRAM(S): at 21:41

## 2021-04-30 RX ADMIN — CHLORHEXIDINE GLUCONATE 1 APPLICATION(S): 213 SOLUTION TOPICAL at 05:49

## 2021-04-30 RX ADMIN — Medication 110 MILLIGRAM(S): at 16:10

## 2021-04-30 RX ADMIN — MORPHINE SULFATE 120 MILLIGRAM(S): 50 CAPSULE, EXTENDED RELEASE ORAL at 21:40

## 2021-04-30 RX ADMIN — Medication 25 GRAM(S): at 10:44

## 2021-04-30 RX ADMIN — Medication 650 MILLIGRAM(S): at 12:03

## 2021-04-30 RX ADMIN — MORPHINE SULFATE 120 MILLIGRAM(S): 50 CAPSULE, EXTENDED RELEASE ORAL at 22:10

## 2021-04-30 RX ADMIN — Medication 650 MILLIGRAM(S): at 04:32

## 2021-04-30 RX ADMIN — MORPHINE SULFATE 30 MILLIGRAM(S): 50 CAPSULE, EXTENDED RELEASE ORAL at 21:08

## 2021-04-30 RX ADMIN — MORPHINE SULFATE 120 MILLIGRAM(S): 50 CAPSULE, EXTENDED RELEASE ORAL at 14:12

## 2021-04-30 RX ADMIN — MORPHINE SULFATE 30 MILLIGRAM(S): 50 CAPSULE, EXTENDED RELEASE ORAL at 20:38

## 2021-04-30 RX ADMIN — Medication 650 MILLIGRAM(S): at 05:03

## 2021-04-30 RX ADMIN — MORPHINE SULFATE 120 MILLIGRAM(S): 50 CAPSULE, EXTENDED RELEASE ORAL at 13:02

## 2021-04-30 NOTE — PROGRESS NOTE ADULT - ASSESSMENT
EP: Covering for Dr. Eaton    49 yo F with hx of SLE, RA, HF sp AICD for primary prevention with subclavian vein stenosis was admitted for laser lead extraction of ICD leads and device and reimplanted with new RA/RV lead and new ICD device (Medtronic) with stenting of subclavian vein. Patient still with partial old atrial lead in place. Postop with fever, last     Impression:  Subclavian Vein Stenosis sp PCI  Laser Lead Extraction of ICD Leads and Device  Reimplant of RA/RV Lead and AICD (Medtronic)  Anemia  HFpEF  SLE  RA       EP: Covering for Dr. Eaton    51 yo F with hx of SLE, RA, HF sp AICD for primary prevention with subclavian vein stenosis was admitted for laser lead extraction of ICD leads and device and reimplanted with new RA/RV lead and new ICD device (Medtronic) with stenting of subclavian vein. Patient still with partial old atrial lead in place. Postop with fever, last     Impression:  Subclavian Vein Stenosis sp PCI on 4/27/2021, POD 3  Laser Lead Extraction of ICD Leads and Device, s/p Reimplant of RA/RV Lead and AICD (Medtronic) on 4/27/2021, POD 3  Postoperative fever since 4/29, unknown origin, last fever 4/29  ; ? PNA (retrocardiac opacity on CXR)  Anemia  HFpEF  SLE  RA    Plan:  - Send UA and culture  - F/u blood cultures  - Encourage use of IS 10x/hour  - OOB to chair, ambulate as tolerated  - Chest PT  - ID c/s  - Trend CBC  - Would start empiric abx therapy  - Cont tele  - Will follow       EP: Covering for Dr. Eaton    49 yo F with hx of SLE, RA, HF sp AICD for primary prevention with subclavian vein stenosis was admitted for laser lead extraction of ICD leads and device and reimplanted with new RA/RV lead and new ICD device (Medtronic) with stenting of subclavian vein. Patient still with partial old atrial lead in place. Postop with fever, last     Impression:  Subclavian Vein Stenosis sp PCI on 4/27/2021, POD 3  Laser Lead Extraction of ICD Leads and Device, s/p Reimplant of RA/RV Lead and AICD (Medtronic) on 4/27/2021, POD 3  Postoperative fever since 4/29, unknown origin, last fever 4/29  ; ? PNA (retrocardiac opacity on CXR)  Anemia  HFpEF  SLE  RA    Plan:  - Send UA and culture  - F/u blood cultures  - Encourage use of IS 10x/hour  - OOB to chair, ambulate as tolerated  - Chest PT  - ID c/s  - Trend CBC  - Would start empiric abx therapy  - Vascular to assess LUE incision  - Cont tele  - Will follow

## 2021-04-30 NOTE — PROGRESS NOTE ADULT - SUBJECTIVE AND OBJECTIVE BOX
INTERVAL HPI/OVERNIGHT EVENTS:  Pt with fever of 101 last night. C/o chills. In NSR, no tele events noted.   Patient denies dizziness, syncope, chest pain, palpitations, SOB, cough, abd pain, n/v/d/c, dysuria, hematuria or unusual rash.     MEDICATIONS  (STANDING):  ALBUTerol    90 MICROgram(s) HFA Inhaler 2 Puff(s) Inhalation every 6 hours  chlorhexidine 4% Liquid 1 Application(s) Topical <User Schedule>  clopidogrel Tablet 75 milliGRAM(s) Oral daily  famotidine    Tablet 20 milliGRAM(s) Oral every 12 hours  folic acid 1 milliGRAM(s) Oral daily  ipratropium 17 MICROgram(s) HFA Inhaler 2 Puff(s) Inhalation every 6 hours  methocarbamol 500 milliGRAM(s) Oral every 8 hours  metoclopramide 10 milliGRAM(s) Oral three times a day  metoprolol succinate ER 50 milliGRAM(s) Oral daily  morphine ER Tablet 120 milliGRAM(s) Oral every 8 hours  morphine ER Tablet 30 milliGRAM(s) Oral <User Schedule>  polyethylene glycol 3350 17 Gram(s) Oral daily  rivaroxaban 20 milliGRAM(s) Oral with dinner  sodium chloride 0.9%. 1000 milliLiter(s) (20 mL/Hr) IV Continuous <Continuous>    MEDICATIONS  (PRN):  acetaminophen   Tablet .. 650 milliGRAM(s) Oral every 6 hours PRN Temp greater or equal to 38C (100.4F), Moderate Pain (4 - 6)  ALPRAZolam 1 milliGRAM(s) Oral three times a day PRN Anxiety  morphine  IR 30 milliGRAM(s) Oral every 4 hours PRN Severe Pain (7 - 10)  zolpidem 5 milliGRAM(s) Oral at bedtime PRN Insomnia  zolpidem 5 milliGRAM(s) Oral at bedtime PRN Insomnia      Allergies    Arava (Anaphylaxis; Angioedema)  Avelox (Other)  Plaquenil (Other)  Vantin (Other (Mild))    Intolerances        REVIEW OF SYSTEMS    [x] A ten-point review of systems was otherwise negative except as noted.  [ ] Due to altered mental status/intubation, subjective information were not able to be obtained from the patient. History was obtained, to the extent possible, from review of the chart and collateral sources of information.      Vital Signs Last 24 Hrs  T(C): 37.5 (2021 05:18), Max: 38.3 (2021 21:18)  T(F): 99.5 (2021 05:18), Max: 101 (2021 21:18)  HR: 77 (2021 05:18) (77 - 84)  BP: 143/68 (2021 05:18) (131/64 - 170/82)  BP(mean): 82 (2021 16:00) (82 - 87)  RR: 18 (2021 08:29) (17 - 22)  SpO2: 94% (2021 08:29) (92% - 95%)    21 @ 07:01  -  21 @ 07:00  --------------------------------------------------------  IN: 940 mL / OUT: 300 mL / NET: 640 mL    21 @ 07:01  -  21 @ 12:44  --------------------------------------------------------  IN: 540 mL / OUT: 450 mL / NET: 90 mL        Physical Exam  GENERAL: In no apparent distress, well nourished, and hydrated.  HEART: Regular rate and rhythm; No murmurs, rubs, or gallops.  PULMONARY: Clear to auscultation and perfusion.  No rales, wheezing, or rhonchi bilaterally.  ABDOMEN: Soft, Nontender, Nondistended; Bowel sounds present  EXTREMITIES:  2+ Peripheral Pulses, No clubbing, cyanosis, or edema  NEUROLOGICAL: AO x4, HUNTER, speech clear    LABS:                        9.9    9.24  )-----------( 101      ( 2021 06:59 )             29.6     04    138  |  101  |  7<L>  ----------------------------<  102<H>  3.6   |  24  |  0.9    Ca    8.8      2021 06:59  Mg     1.7         TPro  5.6<L>  /  Alb  3.7  /  TBili  0.7  /  DBili  x   /  AST  16  /  ALT  8   /  AlkPhos  59        Urinalysis Basic - ( 2021 06:30 )    Color: Light Yellow / Appearance: Clear / S.012 / pH: x  Gluc: x / Ketone: Negative  / Bili: Negative / Urobili: <2 mg/dL   Blood: x / Protein: Negative / Nitrite: Negative   Leuk Esterase: Negative / RBC: 35 /HPF / WBC 2 /HPF   Sq Epi: x / Non Sq Epi: 0 /HPF / Bacteria: Negative        21 @ 07:  -  21 @ 07:00  --------------------------------------------------------  IN: 940 mL / OUT: 300 mL / NET: 640 mL    21 @ 07:  -  21 @ 12:44  --------------------------------------------------------  IN: 540 mL / OUT: 450 mL / NET: 90 mL    21 @ 07:  -  21 @ 07:00  --------------------------------------------------------  IN: 940 mL / OUT: 300 mL / NET: 640 mL    21 @ 07:  -  21 @ 12:44  --------------------------------------------------------  IN: 540 mL / OUT: 450 mL / NET: 90 mL      RADIOLOGY & ADDITIONAL TESTS:  < from: Transthoracic Echocardiogram (19 @ 11:34) >  Summary:   1. Normal global left ventricular systolic function.   2. LV Ejection Fraction by Hendricks's Method with a biplane EF of 63 %.   3. Normal left ventricular internal cavity size.   4. Normal right atrial size.   5. There is no evidence of pericardial effusion.   6. Mild thickening of the anterior and posterior mitral valve leaflets.   7. Peak transaortic gradient equals 8.5 mmHg, mean transaortic gradient   equals 3.7 mmHg, the calculated aortic valve area equals 2.30 cm² by the   continuity equation consistent with mild aortic stenosis.    PHYSICIAN INTERPRETATION:  Left Ventricle: The left ventricular internal cavity size is normal. Left   ventricular wall thickness is normal. Global LV systolic function was   normal.  Right Ventricle: The right ventricular size is normal. RV systolic   function is normal.  Right Atrium: Normal right atrial size.  Pericardium: There is no evidence of pericardial effusion.  Mitral Valve: Mild thickening of the anterior and posterior mitral valve   leaflets. No evidence of mitral valve regurgitation is seen.  Tricuspid Valve: The tricuspid valve is normal in structure. Trivial   tricuspid regurgitation is visualized.  Aortic Valve: The aortic valve is trileaflet. Peak transaortic gradient   equals 8.5 mmHg, mean transaortic gradient equals 3.7 mmHg, the   calculated aortic valve area equals 2.30 cm² by the continuity equation   consistent with mild aortic stenosis. No evidence of aortic valve   regurgitation is seen.  Pulmonic Valve:The pulmonic valve is thickened with good excursion. No   indication of pulmonic valve regurgitation.  Aorta: Aortic root measured at Sinus of Valsalva is normal.  Venous: The inferior vena cava is normal. The inferior vena cava was   normal sized, with respiratory size variation less than 50%.  Additional Comments: A pacer wire is visualized in the right ventricle   and right atrium.       2D AND M-MODE MEASUREMENTS (normal ranges within parentheses):  Left                  Normal   Aorta/Left          Normal  Ventricle:                     Atrium:  IVSd (2D):  0.75 cm  (0.7-1.1) AoV Cusp       1.76  (1.5-2.6)  LVPWd       0.75 cm  (0.7-1.1) Separation:     cm  (2D):                          Left Atrium    3.05  (1.9-4.0)  LVIDd       3.95 cm  (3.4-5.7) (Mmode):        cm  (2D):                          LA Volume      31.0  LVIDs       2.82 cm            Index         ml/m²  (2D):                          Right  LV FS       28.6 %    (>25%)   Ventricle:  (2D):RVd (Mmode):   1.79 cm  IVSd        0.92 cm  (0.7-1.1) RVd (2D):      2.67 cm  (Mmode):  LVPWd       0.90 cm  (0.7-1.1)  (Mmode):  LVIDd       4.23 cm  (3.4-5.7)  (Mmode):  LVIDs       2.93 cm  (Mmode):  LV FS       30.8 %    (>25%)  (Mmode):  Relative     0.38     (<0.42)  Wall  Thickness  Rel. Wall    0.42     (<0.42)  Thickness  Mm  LV Mass    71.2 g/m²  Index:  Mmode    SPECTRAL DOPPLER ANALYSIS:  LV DIASTOLIC FUNCTION:  MV Peak E: 1.06 m/s Decel Time: 213 msec  MV Peak A: 0.81 m/s  E/A Ratio: 1.30    Aortic Valve:  AoV VMax:    1.46 m/s AoV Area, Vmax: 2.11 cm² Vmax Indx: 1.23 cm²/m²  AoV VTI:     0.29 m   AoV Area, VTI:  2.30 cm² VTI Indx:  1.34 cm²/m²  AoV Pk Grad: 8.5 mmHg  AoV Mn Grad: 3.7 mmHg    LVOT Vmax: 1.24 m/s  LVOT VTI:  0.27m  LVOT Diam: 1.78 cm    Mitral Valve:  MV P1/2 Time: 61.71 msec  MV Area, PHT: 3.57 cm²    Tricuspid Valve and PA/RV Systolic Pressure: TR Max Velocity: 2.38 m/s RA   Pressure:  RVSP/PASP:       [id number] Jose Angel Weldon M.D., Electronically signed on 2019 at 3:25:34   PM    < end of copied text >    < from: 12 Lead ECG (21 @ 07:54) >  Ventricular Rate 72 BPM    Atrial Rate 72 BPM    P-R Interval 192 ms    QRS Duration 80 ms    Q-T Interval 388 ms    QTC Calculation(Bazett) 424 ms    P Axis 63 degrees    R Axis 25 degrees    T Axis 12 degrees    Diagnosis Line Normal sinus rhythm  Low voltage QRS  Borderline ECG    < end of copied text >   INTERVAL HPI/OVERNIGHT EVENTS:  Pt with fever of 101 last night. C/o chills. In NSR, no tele events noted.  She states she has greenish sputum/phlegm.   Patient denies dizziness, syncope, chest pain, palpitations, SOB, abd pain, n/v/d/c, dysuria, hematuria or unusual rash.     MEDICATIONS  (STANDING):  ALBUTerol    90 MICROgram(s) HFA Inhaler 2 Puff(s) Inhalation every 6 hours  chlorhexidine 4% Liquid 1 Application(s) Topical <User Schedule>  clopidogrel Tablet 75 milliGRAM(s) Oral daily  famotidine    Tablet 20 milliGRAM(s) Oral every 12 hours  folic acid 1 milliGRAM(s) Oral daily  ipratropium 17 MICROgram(s) HFA Inhaler 2 Puff(s) Inhalation every 6 hours  methocarbamol 500 milliGRAM(s) Oral every 8 hours  metoclopramide 10 milliGRAM(s) Oral three times a day  metoprolol succinate ER 50 milliGRAM(s) Oral daily  morphine ER Tablet 120 milliGRAM(s) Oral every 8 hours  morphine ER Tablet 30 milliGRAM(s) Oral <User Schedule>  polyethylene glycol 3350 17 Gram(s) Oral daily  rivaroxaban 20 milliGRAM(s) Oral with dinner  sodium chloride 0.9%. 1000 milliLiter(s) (20 mL/Hr) IV Continuous <Continuous>    MEDICATIONS  (PRN):  acetaminophen   Tablet .. 650 milliGRAM(s) Oral every 6 hours PRN Temp greater or equal to 38C (100.4F), Moderate Pain (4 - 6)  ALPRAZolam 1 milliGRAM(s) Oral three times a day PRN Anxiety  morphine  IR 30 milliGRAM(s) Oral every 4 hours PRN Severe Pain (7 - 10)  zolpidem 5 milliGRAM(s) Oral at bedtime PRN Insomnia  zolpidem 5 milliGRAM(s) Oral at bedtime PRN Insomnia      Allergies    Arava (Anaphylaxis; Angioedema)  Avelox (Other)  Plaquenil (Other)  Vantin (Other (Mild))    Intolerances        REVIEW OF SYSTEMS  [x] A ten-point review of systems was otherwise negative except as noted.  [ ] Due to altered mental status/intubation, subjective information were not able to be obtained from the patient. History was obtained, to the extent possible, from review of the chart and collateral sources of information.      Vital Signs Last 24 Hrs  T(C): 37.5 (2021 05:18), Max: 38.3 (2021 21:18)  T(F): 99.5 (2021 05:18), Max: 101 (2021 21:18)  HR: 77 (2021 05:18) (77 - 84)  BP: 143/68 (2021 05:18) (131/64 - 170/82)  BP(mean): 82 (2021 16:00) (82 - 87)  RR: 18 (2021 08:29) (17 - 22)  SpO2: 94% (2021 08:29) (92% - 95%)    21 @ 07:01  -  21 @ 07:00  --------------------------------------------------------  IN: 940 mL / OUT: 300 mL / NET: 640 mL    21 @ 07:01  -  21 @ 12:44  --------------------------------------------------------  IN: 540 mL / OUT: 450 mL / NET: 90 mL        Physical Exam  GENERAL: In no apparent distress, well nourished, and hydrated.  HEART: Regular rate and rhythm; No murmurs, rubs, or gallops.  PULMONARY: Clear to auscultation and perfusion.  No rales, wheezing, or rhonchi bilaterally.  ABDOMEN: Soft, Nontender, Nondistended; Bowel sounds present  EXTREMITIES:  2+ Peripheral Pulses, No clubbing, cyanosis, or edema  NEUROLOGICAL: AO x4, HUNTER, speech clear    LABS:                        9.9    9.24  )-----------( 101      ( 2021 06:59 )             29.6         138  |  101  |  7<L>  ----------------------------<  102<H>  3.6   |  24  |  0.9    Ca    8.8      2021 06:59  Mg     1.7         TPro  5.6<L>  /  Alb  3.7  /  TBili  0.7  /  DBili  x   /  AST  16  /  ALT  8   /  AlkPhos  59        Urinalysis Basic - ( 2021 06:30 )    Color: Light Yellow / Appearance: Clear / S.012 / pH: x  Gluc: x / Ketone: Negative  / Bili: Negative / Urobili: <2 mg/dL   Blood: x / Protein: Negative / Nitrite: Negative   Leuk Esterase: Negative / RBC: 35 /HPF / WBC 2 /HPF   Sq Epi: x / Non Sq Epi: 0 /HPF / Bacteria: Negative        21 @ 07:  -  21 @ 07:00  --------------------------------------------------------  IN: 940 mL / OUT: 300 mL / NET: 640 mL    21 @ 07:21 @ 12:44  --------------------------------------------------------  IN: 540 mL / OUT: 450 mL / NET: 90 mL    21 @ 07:21 @ 07:00  --------------------------------------------------------  IN: 940 mL / OUT: 300 mL / NET: 640 mL    21 @ 07:21 @ 12:44  --------------------------------------------------------  IN: 540 mL / OUT: 450 mL / NET: 90 mL      RADIOLOGY & ADDITIONAL TESTS:  < from: Transthoracic Echocardiogram (19 @ 11:34) >  Summary:   1. Normal global left ventricular systolic function.   2. LV Ejection Fraction by Hendricks's Method with a biplane EF of 63 %.   3. Normal left ventricular internal cavity size.   4. Normal right atrial size.   5. There is no evidence of pericardial effusion.   6. Mild thickening of the anterior and posterior mitral valve leaflets.   7. Peak transaortic gradient equals 8.5 mmHg, mean transaortic gradient equals 3.7 mmHg, the calculated aortic valve area equals 2.30 cm² by the continuity equation consistent with mild aortic stenosis.    PHYSICIAN INTERPRETATION:  Left Ventricle: The left ventricular internal cavity size is normal. Left   ventricular wall thickness is normal. Global LV systolic function was   normal.  Right Ventricle: The right ventricular size is normal. RV systolic   function is normal.  Right Atrium: Normal right atrial size.  Pericardium: There is no evidence of pericardial effusion.  Mitral Valve: Mild thickening of the anterior and posterior mitral valve   leaflets. No evidence of mitral valve regurgitation is seen.  Tricuspid Valve: The tricuspid valve is normal in structure. Trivial   tricuspid regurgitation is visualized.  Aortic Valve: The aortic valve is trileaflet. Peak transaortic gradient   equals 8.5 mmHg, mean transaortic gradient equals 3.7 mmHg, the   calculated aortic valve area equals 2.30 cm² by the continuity equation   consistent with mild aortic stenosis. No evidence of aortic valve   regurgitation is seen.  Pulmonic Valve:The pulmonic valve is thickened with good excursion. No   indication of pulmonic valve regurgitation.  Aorta: Aortic root measured at Sinus of Valsalva is normal.  Venous: The inferior vena cava is normal. The inferior vena cava was   normal sized, with respiratory size variation less than 50%.  Additional Comments: A pacer wire is visualized in the right ventricle   and right atrium.       2D AND M-MODE MEASUREMENTS (normal ranges within parentheses):  Left                  Normal   Aorta/Left          Normal  Ventricle:                     Atrium:  IVSd (2D):  0.75 cm  (0.7-1.1) AoV Cusp       1.76  (1.5-2.6)  LVPWd       0.75 cm  (0.7-1.1) Separation:     cm  (2D):                          Left Atrium    3.05  (1.9-4.0)  LVIDd       3.95 cm  (3.4-5.7) (Mmode):        cm  (2D):                          LA Volume      31.0  LVIDs       2.82 cm            Index         ml/m²  (2D):                          Right  LV FS       28.6 %    (>25%)   Ventricle:  (2D):RVd (Mmode):   1.79 cm  IVSd        0.92 cm  (0.7-1.1) RVd (2D):      2.67 cm  (Mmode):  LVPWd       0.90 cm  (0.7-1.1)  (Mmode):  LVIDd       4.23 cm  (3.4-5.7)  (Mmode):  LVIDs       2.93 cm  (Mmode):  LV FS       30.8 %    (>25%)  (Mmode):  Relative     0.38     (<0.42)  Wall  Thickness  Rel. Wall    0.42     (<0.42)  Thickness  Mm  LV Mass    71.2 g/m²  Index:  Mmode    SPECTRAL DOPPLER ANALYSIS:  LV DIASTOLIC FUNCTION:  MV Peak E: 1.06 m/s Decel Time: 213 msec  MV Peak A: 0.81 m/s  E/A Ratio: 1.30    Aortic Valve:  AoV VMax:    1.46 m/s AoV Area, Vmax: 2.11 cm² Vmax Indx: 1.23 cm²/m²  AoV VTI:     0.29 m   AoV Area, VTI:  2.30 cm² VTI Indx:  1.34 cm²/m²  AoV Pk Grad: 8.5 mmHg  AoV Mn Grad: 3.7 mmHg    LVOT Vmax: 1.24 m/s  LVOT VTI:  0.27m  LVOT Diam: 1.78 cm    Mitral Valve:  MV P1/2 Time: 61.71 msec  MV Area, PHT: 3.57 cm²    Tricuspid Valve and PA/RV Systolic Pressure: TR Max Velocity: 2.38 m/s RA   Pressure:  RVSP/PASP:       [id number] Jose Angel Weldon M.D., Electronically signed on 2019 at 3:25:34   PM    < end of copied text >    < from: 12 Lead ECG (21 @ 07:54) >  Ventricular Rate 72 BPM    Atrial Rate 72 BPM    P-R Interval 192 ms    QRS Duration 80 ms    Q-T Interval 388 ms    QTC Calculation(Bazett) 424 ms    P Axis 63 degrees    R Axis 25 degrees    T Axis 12 degrees    Diagnosis Line Normal sinus rhythm  Low voltage QRS  Borderline ECG    < end of copied text >

## 2021-04-30 NOTE — PROGRESS NOTE ADULT - SUBJECTIVE AND OBJECTIVE BOX
Patient is a 50y old Female who presents with a chief complaint of   No acute events overnight. Patient has no complaints this morning. Denies chest pain, SOB, N/V/D,    PAST MEDICAL & SURGICAL HISTORY:  Lupus    RA (rheumatoid arthritis)    HTN (hypertension)    CHF (congestive heart failure)    COPD (chronic obstructive pulmonary disease)    DVT (deep venous thrombosis)    Pulmonary embolism    History of laryngeal cancer    S/P appendectomy    S/P cholecystectomy    AICD (automatic cardioverter/defibrillator) present    S/P hysterectomy    History of back surgery    H/O foot surgery    S/P eye surgery        PHYSICAL EXAM  Vital Signs Last 24 Hrs  T(C): 37.5 (2021 05:18), Max: 38.3 (2021 21:18)  T(F): 99.5 (2021 05:18), Max: 101 (2021 21:18)  HR: 77 (2021 05:18) (77 - 84)  BP: 143/68 (2021 05:18) (131/64 - 170/82)  BP(mean): 82 (2021 16:00) (82 - 93)  RR: 18 (2021 08:29) (17 - 22)  SpO2: 94% (2021 08:29) (92% - 95%)    I&O's Summary    2021 07:01  -  2021 07:00  --------------------------------------------------------  IN: 940 mL / OUT: 300 mL / NET: 640 mL      GEN: No acute distress  LUNGS: Clear to auscultation bilaterally left ICD  HEART: S1/S2 present. RRR. (-) rub  ABD: Soft, non-tender, non-distended. Bowel sounds present  EXT: NC/NC/NE/2+PP/HUNTER  NEURO: AAOX3      LABS                        9.9    9.24  )-----------( 101      ( 2021 06:59 )             29.6     Hemoglobin: 9.9 g/dL ( @ 06:59)  Hemoglobin: 9.2 g/dL ( @ 05:18)  Hemoglobin: 9.7 g/dL ( @ 18:14)  Hemoglobin: 9.2 g/dL ( @ 05:09)  Hemoglobin: 10.8 g/dL ( @ 18:23)    CBC Full  -  ( 2021 06:59 )  WBC Count : 9.24 K/uL  RBC Count : 3.34 M/uL  Hemoglobin : 9.9 g/dL  Hematocrit : 29.6 %  Platelet Count - Automated : 101 K/uL  Mean Cell Volume : 88.6 fL  Mean Cell Hemoglobin : 29.6 pg  Mean Cell Hemoglobin Concentration : 33.4 g/dL  Auto Neutrophil # : x  Auto Lymphocyte # : x  Auto Monocyte # : x  Auto Eosinophil # : x  Auto Basophil # : x  Auto Neutrophil % : x  Auto Lymphocyte % : x  Auto Monocyte % : x  Auto Eosinophil % : x  Auto Basophil % : x        138  |  101  |  7<L>  ----------------------------<  102<H>  3.6   |  24  |  0.9    Ca    8.8      2021 06:59  Mg     1.7         TPro  5.6<L>  /  Alb  3.7  /  TBili  0.7  /  DBili  x   /  AST  16  /  ALT  8   /  AlkPhos  59      Creatinine Trend: 0.9<--, 0.9<--, 0.8<--, 0.8<--, 1.0<--  LIVER FUNCTIONS - ( 2021 06:59 )  Alb: 3.7 g/dL / Pro: 5.6 g/dL / ALK PHOS: 59 U/L / ALT: 8 U/L / AST: 16 U/L / GGT: x                         Urinalysis Basic - ( 2021 06:30 )    Color: Light Yellow / Appearance: Clear / S.012 / pH: x  Gluc: x / Ketone: Negative  / Bili: Negative / Urobili: <2 mg/dL   Blood: x / Protein: Negative / Nitrite: Negative   Leuk Esterase: Negative / RBC: 35 /HPF / WBC 2 /HPF   Sq Epi: x / Non Sq Epi: 0 /HPF / Bacteria: Negative   Patient is a 50y old Female who presents with a chief complaint of   No acute events overnight. Patient has no complaints this morning. Denies chest pain, SOB, N/V/D,    Attending note: Pt seen and examined at bedside. No cp and no sob. pt is complaining of a cough with yellow sputum.     PAST MEDICAL & SURGICAL HISTORY:  Lupus    RA (rheumatoid arthritis)    HTN (hypertension)    CHF (congestive heart failure)    COPD (chronic obstructive pulmonary disease)    DVT (deep venous thrombosis)    Pulmonary embolism    History of laryngeal cancer    S/P appendectomy    S/P cholecystectomy    AICD (automatic cardioverter/defibrillator) present    S/P hysterectomy    History of back surgery    H/O foot surgery    S/P eye surgery        PHYSICAL EXAM  Vital Signs Last 24 Hrs  T(C): 37.5 (2021 05:18), Max: 38.3 (2021 21:18)  T(F): 99.5 (2021 05:18), Max: 101 (2021 21:18)  HR: 77 (2021 05:18) (77 - 84)  BP: 143/68 (2021 05:18) (131/64 - 170/82)  BP(mean): 82 (2021 16:00) (82 - 93)  RR: 18 (2021 08:29) (17 - 22)  SpO2: 94% (2021 08:29) (92% - 95%)    I&O's Summary    2021 07:01  -  2021 07:00  --------------------------------------------------------  IN: 940 mL / OUT: 300 mL / NET: 640 mL      GEN: No acute distress  LUNGS: Clear to auscultation bilaterally left ICD  HEART: S1/S2 present. RRR. (-) rub  ABD: Soft, non-tender, non-distended. Bowel sounds present  EXT: NC/NC/NE/2+PP/HUNTER  NEURO: AAOX3      LABS                        9.9    9.24  )-----------( 101      ( 2021 06:59 )             29.6     Hemoglobin: 9.9 g/dL ( @ 06:59)  Hemoglobin: 9.2 g/dL ( @ 05:18)  Hemoglobin: 9.7 g/dL ( @ 18:14)  Hemoglobin: 9.2 g/dL ( @ 05:09)  Hemoglobin: 10.8 g/dL ( @ 18:23)    CBC Full  -  ( 2021 06:59 )  WBC Count : 9.24 K/uL  RBC Count : 3.34 M/uL  Hemoglobin : 9.9 g/dL  Hematocrit : 29.6 %  Platelet Count - Automated : 101 K/uL  Mean Cell Volume : 88.6 fL  Mean Cell Hemoglobin : 29.6 pg  Mean Cell Hemoglobin Concentration : 33.4 g/dL  Auto Neutrophil # : x  Auto Lymphocyte # : x  Auto Monocyte # : x  Auto Eosinophil # : x  Auto Basophil # : x  Auto Neutrophil % : x  Auto Lymphocyte % : x  Auto Monocyte % : x  Auto Eosinophil % : x  Auto Basophil % : x        138  |  101  |  7<L>  ----------------------------<  102<H>  3.6   |  24  |  0.9    Ca    8.8      2021 06:59  Mg     1.7         TPro  5.6<L>  /  Alb  3.7  /  TBili  0.7  /  DBili  x   /  AST  16  /  ALT  8   /  AlkPhos  59      Creatinine Trend: 0.9<--, 0.9<--, 0.8<--, 0.8<--, 1.0<--  LIVER FUNCTIONS - ( 2021 06:59 )  Alb: 3.7 g/dL / Pro: 5.6 g/dL / ALK PHOS: 59 U/L / ALT: 8 U/L / AST: 16 U/L / GGT: x               < from: Xray Chest 1 View- PORTABLE-Routine (21 @ 06:41) >    IMPRESSION:    Stable retrocardiac opacity.    < end of copied text >            Urinalysis Basic - ( 2021 06:30 )    Color: Light Yellow / Appearance: Clear / S.012 / pH: x  Gluc: x / Ketone: Negative  / Bili: Negative / Urobili: <2 mg/dL   Blood: x / Protein: Negative / Nitrite: Negative   Leuk Esterase: Negative / RBC: 35 /HPF / WBC 2 /HPF   Sq Epi: x / Non Sq Epi: 0 /HPF / Bacteria: Negative

## 2021-04-30 NOTE — PROGRESS NOTE ADULT - ASSESSMENT
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 S/p angioplasty with stent placement. Course complicated by post-procedural hypotension.    SICU COURSE:  Pt with Left subclavian vein stenosis s/p elective angioplasty with stent 4/27/21, S/p AICD/PPM and leads extraction and replacement on 4/27/21. Pt sent to CCU for observation post procedure. Hypotension post-procedure - resolved s/p 250ml NS bolus x 2.      Left subclavian vein stenosis   - S/p elective angioplasty with stent placement 4/27/21  - On Vancomycin jelena-operatively (ppx)  - Monitor vitals  - Low grade fever overnight, pancultures sent  - No clinical evidence of bleeding - CT chest/abdomen/pelvis without significant hematoma (small subcutaneous hematoma at the surgery site)  - Monitor H/H - stable  - Keep Xarelto on hold for now - to be resumed tomorrow as per EP  - Continue with plavix post left subclavian angioplasty/stent  - F/u Vascular      H/o CHF s/p AICD  - Dx in 2011 during a long hospital stay, cause unknown, but required temp pacemaker. Upgraded to AICD due to recurrent Vtach  - S/p replacement of leads on 4/27/21  - F/u EP      Hypotension post-procedure  - s/p 250cc NS bolus x2  - resolved  - BP meds held initially  - Will resume toprol half of home dose today (Toprol 50mg daily) - increase to home dose upon discharge      History of long-QT syndrome and VT   - s/p AICD placement      History of VTE on Xarelto  - Keep Xarelto on hold for now - to be resumed tomorrow as per EP      SLE  - On Xarelto      RA  - Outpatient f/u      COPD  - Continue inhalers and nebs for COPD      Misc  - DVT ppx: Lovenox 40mg, restart Xarelto  - GI ppx:  - Code status: full code  - Dispo: Anticipate discharge home tomorrow. 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 S/p angioplasty with stent placement. Course complicated by post-procedural hypotension.    SICU COURSE:  Pt with Left subclavian vein stenosis s/p elective angioplasty with stent 4/27/21, S/p AICD/PPM and leads extraction and replacement on 4/27/21. Pt sent to CCU for observation post procedure. Hypotension post-procedure - resolved s/p 250ml NS bolus x 2.      Left subclavian vein stenosis   - S/p elective angioplasty with stent placement 4/27/21  - On Vancomycin jelena-operatively (ppx)  - Monitor vitals  - No clinical evidence of bleeding - CT chest/abdomen/pelvis without significant hematoma (small subcutaneous hematoma at the surgery site)  - Monitor H/H - stable  - Per EP, Xarelto 20mg daily restarted today  - Continue with plavix post left subclavian angioplasty/stent  - F/u Vascular      Possible Pneumonia  - Febrile at 100.9 on 4/29 at midnight, febrile again at 101 on 4/29 at 21:00. Pan cultures sent  - CXR 4/29: increasing LLL retrocardiac opacity  - ID consulted: f/u recs  -       H/o CHF s/p AICD  - Dx in 2011 during a long hospital stay, cause unknown, but required temp pacemaker. Upgraded to AICD due to recurrent Vtach  - S/p replacement of leads on 4/27/21  - F/u EP      Hypotension post-procedure  - s/p 250cc NS bolus x2  - resolved  - BP meds held initially  - Will resume toprol half of home dose today (Toprol 50mg daily) - increase to home dose upon discharge      History of long-QT syndrome and VT   - s/p AICD placement      History of VTE on Xarelto  - Keep Xarelto on hold for now - to be resumed tomorrow as per EP      SLE  - On Xarelto      RA  - Outpatient f/u      COPD  - Continue inhalers and nebs for COPD      Misc  - DVT ppx: Lovenox 40mg, restart Xarelto  - GI ppx:  - Code status: full code  - Dispo: Anticipate discharge home tomorrow. 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 S/p angioplasty with stent placement. Course complicated by post-procedural hypotension.    SICU COURSE:  Pt with Left subclavian vein stenosis s/p elective angioplasty with stent 4/27/21, S/p AICD/PPM and leads extraction and replacement on 4/27/21. Pt sent to CCU for observation post procedure. Hypotension post-procedure - resolved s/p 250ml NS bolus x 2.      Left subclavian vein stenosis   - S/p elective angioplasty with stent placement 4/27/21  - On Vancomycin jelena-operatively (ppx)  - Monitor vitals  - No clinical evidence of bleeding - CT chest/abdomen/pelvis without significant hematoma (small subcutaneous hematoma at the surgery site)  - Monitor H/H - stable  - Per EP, Xarelto 20mg daily restarted today  - Continue with plavix post left subclavian angioplasty/stent  - F/u Vascular      Possible Pneumonia  - Febrile at 100.9 on 4/29 at midnight, febrile again at 101 on 4/29 at 21:00. Pan cultures sent  - CXR 4/29: increasing LLL retrocardiac opacity  - ID consulted: f/u recs  - Empiric abx coverage for now, pt has allergy to Cefpodoxime and Fluoroquinolone      H/o CHF s/p AICD  - Dx in 2011 during a long hospital stay, cause unknown, but required temp pacemaker. Upgraded to AICD due to recurrent Vtach  - S/p replacement of leads on 4/27/21  - F/u EP      Hypotension post-procedure  - s/p 250cc NS bolus x2  - resolved  - BP meds held initially  - Will resume toprol half of home dose today (Toprol 50mg daily) - increase to home dose upon discharge      History of long-QT syndrome and VT   - s/p AICD placement      History of VTE on Xarelto  - Keep Xarelto on hold for now - to be resumed tomorrow as per EP      SLE  - On Xarelto      RA  - Outpatient f/u      COPD  - Continue inhalers and nebs for COPD      Misc  - DVT ppx: Lovenox 40mg, restart Xarelto  - GI ppx:  - Code status: full code  - Dispo: Anticipate discharge home tomorrow. 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 S/p angioplasty with stent placement. Course complicated by post-procedural hypotension.    SICU COURSE:  Pt with Left subclavian vein stenosis s/p elective angioplasty with stent 4/27/21, S/p AICD/PPM and leads extraction and replacement on 4/27/21. Pt sent to CCU for observation post procedure. Hypotension post-procedure - resolved s/p 250ml NS bolus x 2.      Left subclavian vein stenosis   - S/p elective angioplasty with stent placement 4/27/21  - On Vancomycin jelena-operatively (ppx)  - Monitor vitals  - No clinical evidence of bleeding - CT chest/abdomen/pelvis without significant hematoma (small subcutaneous hematoma at the surgery site)  - Monitor H/H - stable  - Per EP, Xarelto 20mg daily restarted today  - Continue with plavix post left subclavian angioplasty/stent  - F/u Vascular      suspected gram neg pneumonia   - Febrile at 100.9 on 4/29 at midnight, febrile again at 101 on 4/29 at 21:00. Pan cultures sent  - CXR 4/29: increasing LLL retrocardiac opacity  - ID consulted: f/u recs  - Empiric abx coverage for now, pt has allergy to Cefpodoxime and Fluoroquinolone    Attending note: Pt is symptomatic, having cough and yellow sputum. follow up legionella and strep urine, follow up procal. DW ID regarding, sputum culture. UA neg, follow up urine culture. Lead site tender. Mild redness. Pt now on aztreonam and doxy      H/o CHF s/p AICD  - Dx in 2011 during a long hospital stay, cause unknown, but required temp pacemaker. Upgraded to AICD due to recurrent Vtach  - S/p replacement of leads on 4/27/21  - F/u EP      Hypotension post-procedure  - s/p 250cc NS bolus x2  - resolved  - BP meds held initially  - Will resume toprol half of home dose today (Toprol 50mg daily) - increase to home dose upon discharge      History of long-QT syndrome and VT   - s/p AICD placement      History of VTE on Xarelto  - Keep Xarelto on hold for now - to be resumed tomorrow as per EP      SLE  - On Xarelto      RA  - Outpatient f/u      COPD  - Continue inhalers and nebs for COPD      Misc  - DVT ppx: Lovenox 40mg, restart Xarelto  - GI ppx:  - Code status: full code  - Dispo: Anticipate discharge home tomorrow. Resume home meds (baclofen, MS contin...)

## 2021-05-01 LAB
CULTURE RESULTS: SIGNIFICANT CHANGE UP
GRAM STN FLD: SIGNIFICANT CHANGE UP
LEGIONELLA AG UR QL: NEGATIVE — SIGNIFICANT CHANGE UP
PROCALCITONIN SERPL-MCNC: 0.07 NG/ML — SIGNIFICANT CHANGE UP (ref 0.02–0.1)
SPECIMEN SOURCE: SIGNIFICANT CHANGE UP
SPECIMEN SOURCE: SIGNIFICANT CHANGE UP

## 2021-05-01 PROCEDURE — 99233 SBSQ HOSP IP/OBS HIGH 50: CPT

## 2021-05-01 PROCEDURE — 93010 ELECTROCARDIOGRAM REPORT: CPT

## 2021-05-01 PROCEDURE — 99232 SBSQ HOSP IP/OBS MODERATE 35: CPT

## 2021-05-01 RX ORDER — HYDROMORPHONE HYDROCHLORIDE 2 MG/ML
0.5 INJECTION INTRAMUSCULAR; INTRAVENOUS; SUBCUTANEOUS ONCE
Refills: 0 | Status: DISCONTINUED | OUTPATIENT
Start: 2021-05-01 | End: 2021-05-01

## 2021-05-01 RX ORDER — OXYCODONE HYDROCHLORIDE 5 MG/1
5 TABLET ORAL EVERY 6 HOURS
Refills: 0 | Status: DISCONTINUED | OUTPATIENT
Start: 2021-05-01 | End: 2021-05-03

## 2021-05-01 RX ORDER — MORPHINE SULFATE 50 MG/1
2 CAPSULE, EXTENDED RELEASE ORAL ONCE
Refills: 0 | Status: DISCONTINUED | OUTPATIENT
Start: 2021-05-01 | End: 2021-05-01

## 2021-05-01 RX ADMIN — MORPHINE SULFATE 2 MILLIGRAM(S): 50 CAPSULE, EXTENDED RELEASE ORAL at 20:23

## 2021-05-01 RX ADMIN — POLYETHYLENE GLYCOL 3350 17 GRAM(S): 17 POWDER, FOR SOLUTION ORAL at 11:03

## 2021-05-01 RX ADMIN — MORPHINE SULFATE 2 MILLIGRAM(S): 50 CAPSULE, EXTENDED RELEASE ORAL at 20:53

## 2021-05-01 RX ADMIN — RIVAROXABAN 20 MILLIGRAM(S): KIT at 17:22

## 2021-05-01 RX ADMIN — Medication 10 MILLIGRAM(S): at 15:32

## 2021-05-01 RX ADMIN — HYDROMORPHONE HYDROCHLORIDE 0.5 MILLIGRAM(S): 2 INJECTION INTRAMUSCULAR; INTRAVENOUS; SUBCUTANEOUS at 15:35

## 2021-05-01 RX ADMIN — MORPHINE SULFATE 120 MILLIGRAM(S): 50 CAPSULE, EXTENDED RELEASE ORAL at 21:58

## 2021-05-01 RX ADMIN — Medication 50 MILLIGRAM(S): at 05:19

## 2021-05-01 RX ADMIN — Medication 10 MILLIGRAM(S): at 05:19

## 2021-05-01 RX ADMIN — FAMOTIDINE 20 MILLIGRAM(S): 10 INJECTION INTRAVENOUS at 05:19

## 2021-05-01 RX ADMIN — METHOCARBAMOL 500 MILLIGRAM(S): 500 TABLET, FILM COATED ORAL at 21:55

## 2021-05-01 RX ADMIN — MORPHINE SULFATE 120 MILLIGRAM(S): 50 CAPSULE, EXTENDED RELEASE ORAL at 05:50

## 2021-05-01 RX ADMIN — CHLORHEXIDINE GLUCONATE 1 APPLICATION(S): 213 SOLUTION TOPICAL at 05:20

## 2021-05-01 RX ADMIN — MORPHINE SULFATE 30 MILLIGRAM(S): 50 CAPSULE, EXTENDED RELEASE ORAL at 17:51

## 2021-05-01 RX ADMIN — METHOCARBAMOL 500 MILLIGRAM(S): 500 TABLET, FILM COATED ORAL at 15:32

## 2021-05-01 RX ADMIN — MORPHINE SULFATE 120 MILLIGRAM(S): 50 CAPSULE, EXTENDED RELEASE ORAL at 14:39

## 2021-05-01 RX ADMIN — FAMOTIDINE 20 MILLIGRAM(S): 10 INJECTION INTRAVENOUS at 17:22

## 2021-05-01 RX ADMIN — MORPHINE SULFATE 120 MILLIGRAM(S): 50 CAPSULE, EXTENDED RELEASE ORAL at 05:20

## 2021-05-01 RX ADMIN — MORPHINE SULFATE 120 MILLIGRAM(S): 50 CAPSULE, EXTENDED RELEASE ORAL at 22:28

## 2021-05-01 RX ADMIN — Medication 110 MILLIGRAM(S): at 17:22

## 2021-05-01 RX ADMIN — MORPHINE SULFATE 30 MILLIGRAM(S): 50 CAPSULE, EXTENDED RELEASE ORAL at 10:23

## 2021-05-01 RX ADMIN — HYDROMORPHONE HYDROCHLORIDE 0.5 MILLIGRAM(S): 2 INJECTION INTRAMUSCULAR; INTRAVENOUS; SUBCUTANEOUS at 12:07

## 2021-05-01 RX ADMIN — Medication 100 MILLIGRAM(S): at 05:22

## 2021-05-01 RX ADMIN — Medication 10 MILLIGRAM(S): at 21:55

## 2021-05-01 RX ADMIN — CLOPIDOGREL BISULFATE 75 MILLIGRAM(S): 75 TABLET, FILM COATED ORAL at 11:03

## 2021-05-01 RX ADMIN — OXYCODONE HYDROCHLORIDE 5 MILLIGRAM(S): 5 TABLET ORAL at 17:52

## 2021-05-01 RX ADMIN — Medication 110 MILLIGRAM(S): at 05:22

## 2021-05-01 RX ADMIN — Medication 100 MILLIGRAM(S): at 21:55

## 2021-05-01 RX ADMIN — Medication 1 MILLIGRAM(S): at 05:18

## 2021-05-01 RX ADMIN — Medication 100 MILLIGRAM(S): at 17:20

## 2021-05-01 RX ADMIN — Medication 1 MILLIGRAM(S): at 11:03

## 2021-05-01 RX ADMIN — METHOCARBAMOL 500 MILLIGRAM(S): 500 TABLET, FILM COATED ORAL at 05:19

## 2021-05-01 NOTE — CONSULT NOTE ADULT - SUBJECTIVE AND OBJECTIVE BOX
ILEANA MCNEIL  50y, Female  Allergy: Arava (Anaphylaxis; Angioedema)  Avelox (Other)  Plaquenil (Other)  Vantin (Other (Mild))      CHIEF COMPLAINT: Left subclavian vein stenosis (2021 12:43)      LOS  4d    HPI:  49 yo F with hx of SLE, RA, HF sp AICD for primary prevention with subclavian vein stenosis was admitted for laser lead extraction of ICD leads and device and reimplanted with new RA/RV lead and new ICD device (Medtronic) with stenting of subclavian vein    She is s/p Subclavian Vein Stenosis sp PCI on 2021  Laser Lead Extraction of ICD Leads and Device, s/p Reimplant of RA/RV Lead and AICD (Medtronic) on 2021  Noted to have anemia -- CT Abd/Pelvis  negative for RP  Course complicated with fevers on .  X ray on  showing left retrocardiac opacity    PAST MEDICAL & SURGICAL HISTORY:  Lupus    RA (rheumatoid arthritis)    HTN (hypertension)    CHF (congestive heart failure)    COPD (chronic obstructive pulmonary disease)    DVT (deep venous thrombosis)    Pulmonary embolism    History of laryngeal cancer    S/P appendectomy    S/P cholecystectomy    AICD (automatic cardioverter/defibrillator) present    S/P hysterectomy    History of back surgery    H/O foot surgery    S/P eye surgery        FAMILY HISTORY  Family history of cervical cancer    FH: thyroid cancer        SOCIAL HISTORY  Social History:  Tobacco: Stopped smoking 1 year ago. Was smoking 10 cigarettes daily for 30 years. Currently vapes occasionally   Alcohol: No  Drug Use: No (2019 04:54)        ROS  General: Denies rigors, nightsweats  HEENT: Denies headache, rhinorrhea, sore throat, eye pain  CV: Denies CP, palpitations  PULM: Denies wheezing, hemoptysis  GI: Denies hematemesis, hematochezia, melena  : Denies discharge, hematuria  MSK: Denies arthralgias, myalgias  SKIN: Denies rash, lesions  NEURO: Denies paresthesias, weakness  PSYCH: Denies depression, anxiety    VITALS:  T(F): 98.6, Max: 99.2 (21 @ 20:33)  HR: 82  BP: 129/62  RR: 18Vital Signs Last 24 Hrs  T(C): 37 (01 May 2021 04:59), Max: 37.3 (2021 20:33)  T(F): 98.6 (01 May 2021 04:59), Max: 99.2 (2021 20:33)  HR: 82 (01 May 2021 04:59) (77 - 82)  BP: 129/62 (01 May 2021 04:59) (129/62 - 161/74)  BP(mean): --  RR: 18 (01 May 2021 04:59) (18 - 18)  SpO2: 95% (2021 20:33) (94% - 95%)    PHYSICAL EXAM:  Gen: NAD, resting in bed  HEENT: Normocephalic, atraumatic  Neck: supple, no lymphadenopathy  CV: Regular rate & regular rhythm  Lungs: decreased BS at bases, no fremitus  Abdomen: Soft, BS present  Ext: Warm, well perfused  Neuro: non focal, awake  Skin: no rash, no erythema  Lines: no phlebitis    TESTS & MEASUREMENTS:                        9.9    9.24  )-----------( 101      ( 2021 06:59 )             29.6     04-30    138  |  101  |  7<L>  ----------------------------<  102<H>  3.6   |  24  |  0.9    Ca    8.8      2021 06:59  Mg     1.7     04-30    TPro  5.6<L>  /  Alb  3.7  /  TBili  0.7  /  DBili  x   /  AST  16  /  ALT  8   /  AlkPhos  59  04-30      LIVER FUNCTIONS - ( 2021 06:59 )  Alb: 3.7 g/dL / Pro: 5.6 g/dL / ALK PHOS: 59 U/L / ALT: 8 U/L / AST: 16 U/L / GGT: x           Urinalysis Basic - ( 2021 15:29 )    Color: Light Yellow / Appearance: Clear / S.012 / pH: x  Gluc: x / Ketone: Negative  / Bili: Negative / Urobili: <2 mg/dL   Blood: x / Protein: Negative / Nitrite: Negative   Leuk Esterase: Moderate / RBC: 6 /HPF / WBC 6 /HPF   Sq Epi: x / Non Sq Epi: 3 /HPF / Bacteria: Negative        Culture - Sputum (collected 21 @ 17:03)  Source: .Sputum Sputum  Gram Stain (21 @ 07:45):    Numerous polymorphonuclear leukocytes per low power field    Rare Squamous epithelial cells per low power field    Numerous Gram positive cocci in pairs seen per oil power field    Few Gram Variable Rods seen per oil power field    Culture - Blood (collected 21 @ 05:18)  Source: .Blood None  Preliminary Report (21 @ 14:01):    No growth to date.    Culture - Urine (collected 21 @ 11:30)  Source: .Urine Clean Catch (Midstream)  Final Report (21 @ 20:34):    <10,000 CFU/mL Normal Urogenital Dariana    Culture - Urine (collected 21 @ 17:28)  Source: .Urine Clean Catch (Midstream)  Final Report (21 @ 13:28):    >=3 organisms. Probable collection contamination.            INFECTIOUS DISEASES TESTING      RADIOLOGY & ADDITIONAL TESTS:  I have personally reviewed the last Chest xray  CXR  Xray Chest 1 View- PORTABLE-Urgent:   EXAM:  XR CHEST PORTABLE URGENT 1V            PROCEDURE DATE:  2021            INTERPRETATION:  Clinical History/Reason for Exam:  fever    Comparison: XR CHEST 2021.      Findings:    Technique/Positioning:  Frontal radiograph of the chest.    Support devices:  Stable position.    Cardiac/mediastinum/hilum: Stable    Lung parenchyma/ Pleura: Left retrocardiac opacity. No pleural effusions or pneumothorax.      Skeleton/soft tissues: Stable      Impression:    Left retrocardiac opacity.                      JEREMIAH MIRANDA MD; Attending Radiologist  This document has been electronically signed. 2021  9:21AM (21 @ 01:22)      CT  CT Chest No Cont:   EXAM:  CT CHEST            PROCEDURE DATE:  2021            INTERPRETATION:  Reason for Exam:  Postoperative.  CT of the chest was performed from the thoracic inlet to the level of the adrenal glands without contrast injection. Coronal and sagittal images have been submitted.    Comparison: 2020.    Findings:    Tubes/Lines: Patient has a left-sided pacemaker. In the interim, subcutaneous air and some surrounding hematoma is seen adjacent. There is been placement of a left subclavian vein stent with the leads extending through it.    Lungs, Pleura, and Airways:Left lower lobe opacity. Right lower lobe atelectasis.    Mediastinum/Lymph Nodes:No axillary, hilar, mediastinal lymphadenopathy.    Heart/Great Vessels: Heart size is top normal. Trace pericardial fluid is seen.    Abdomen: Please see discussion the abdomen and pelvis in the same day.    Bones and soft tissues: Bones are within normal limits.    IMPRESSION:    Postoperative change with left chest subcutaneous air and some blood. Interval placement of the left subclavian venous stent.                    MANJULA BENITEZ MD; Attending Interventional Radiologist  This document has been electronically signed. 2021  1:32PM (21 @ 10:39)      CARDIOLOGY TESTING  12 Lead ECG:   Ventricular Rate 72 BPM    Atrial Rate 72 BPM    P-R Interval 192 ms    QRS Duration 80 ms    Q-T Interval 388 ms    QTC Calculation(Bazett) 424 ms    P Axis 63 degrees    R Axis 25 degrees    T Axis 12 degrees    Diagnosis Line Normal sinus rhythm  Low voltage QRS  Borderline ECG    Confirmed by MILAD DYKES MD ([id number]) on 2021 9:26:14 AM (21 @ 07:54)  12 Lead ECG:   Ventricular Rate 77 BPM    Atrial Rate 77 BPM    P-R Interval 174 ms    QRS Duration 76 ms    Q-T Interval 376 ms    QTC Calculation(Bazett) 425 ms    P Axis 57 degrees    R Axis 43 degrees    T Axis 22 degrees    Diagnosis Line Sinus rhythm with Premature atrial complexes  Low voltage QRS  Borderline ECG    Confirmed by Hayden Saldana ([id number]) on 2021 7:40:49 AM (21 @ 05:25)      MEDICATIONS  ALBUTerol    90 MICROgram(s) HFA Inhaler 2 Inhalation every 6 hours  aztreonam  IVPB 2000 IV Intermittent every 8 hours  chlorhexidine 4% Liquid 1 Topical <User Schedule>  clopidogrel Tablet 75 Oral daily  doxycycline IVPB 100 IV Intermittent every 12 hours  famotidine    Tablet 20 Oral every 12 hours  folic acid 1 Oral daily  ipratropium 17 MICROgram(s) HFA Inhaler 2 Inhalation every 6 hours  methocarbamol 500 Oral every 8 hours  metoclopramide 10 Oral three times a day  metoprolol succinate ER 50 Oral daily  morphine ER Tablet 120 Oral every 8 hours  morphine ER Tablet 30 Oral <User Schedule>  polyethylene glycol 3350 17 Oral daily  rivaroxaban 20 Oral with dinner  sodium chloride 0.9%. 1000 IV Continuous <Continuous>      Weight  Weight (kg): 82.5 (21 @ 21:18)    ANTIBIOTICS:  aztreonam  IVPB 2000 milliGRAM(s) IV Intermittent every 8 hours  doxycycline IVPB 100 milliGRAM(s) IV Intermittent every 12 hours      ALLERGIES:  Arava (Anaphylaxis; Angioedema)  Avelox (Other)  Plaquenil (Other)  Vantin (Other (Mild))           ILEANA MCNEIL  50y, Female  Allergy: Arava (Anaphylaxis; Angioedema)  Avelox (Other)  Plaquenil (Other)  Vantin (Other (Mild))      CHIEF COMPLAINT: Left subclavian vein stenosis (2021 12:43)      LOS  4d    HPI:  49 yo F with hx of SLE, RA, HF sp AICD for primary prevention with subclavian vein stenosis was admitted for laser lead extraction of ICD leads and device and reimplanted with new RA/RV lead and new ICD device (Medtronic) with stenting of subclavian vein    She is s/p Subclavian Vein Stenosis sp PCI on 2021  Has Hx of Prolonged QTC.  Laser Lead Extraction of ICD Leads and Device, s/p Reimplant of RA/RV Lead and AICD (Medtronic) on 2021.  Prolonged procedure; Enceloped AICD placed   Noted to have anemia -- CT Abd/Pelvis  negative for RP  Course complicated with fevers on .  X ray on  showing left retrocardiac opacity.  She has pain by surgical sites.   Main complaint has been coughing, developed while here.     PAST MEDICAL & SURGICAL HISTORY:  Lupus    RA (rheumatoid arthritis)    HTN (hypertension)    CHF (congestive heart failure)    COPD (chronic obstructive pulmonary disease)    DVT (deep venous thrombosis)    Pulmonary embolism    History of laryngeal cancer    S/P appendectomy    S/P cholecystectomy    AICD (automatic cardioverter/defibrillator) present    S/P hysterectomy    History of back surgery    H/O foot surgery    S/P eye surgery        FAMILY HISTORY  Family history of cervical cancer    FH: thyroid cancer        SOCIAL HISTORY  Social History:  Tobacco: Stopped smoking 1 year ago. Was smoking 10 cigarettes daily for 30 years. Currently vapes occasionally   Alcohol: No  Drug Use: No (2019 04:54)        ROS  General: Denies rigors, nightsweats  HEENT: Denies headache, rhinorrhea, sore throat, eye pain  CV: Denies CP, palpitations  PULM: Denies wheezing, hemoptysis  GI: Denies hematemesis, hematochezia, melena  : Denies discharge, hematuria  MSK: Denies arthralgias, myalgias  SKIN: Denies rash, lesions  NEURO: Denies paresthesias, weakness  PSYCH: Denies depression, anxiety    VITALS:  T(F): 98.6, Max: 99.2 (04-30-21 @ 20:33)  HR: 82  BP: 129/62  RR: 18Vital Signs Last 24 Hrs  T(C): 37 (01 May 2021 04:59), Max: 37.3 (2021 20:33)  T(F): 98.6 (01 May 2021 04:59), Max: 99.2 (2021 20:33)  HR: 82 (01 May 2021 04:59) (77 - 82)  BP: 129/62 (01 May 2021 04:59) (129/62 - 161/74)  BP(mean): --  RR: 18 (01 May 2021 04:59) (18 - 18)  SpO2: 95% (2021 20:33) (94% - 95%)    PHYSICAL EXAM:  Gen: NAD, resting in bed  HEENT: Normocephalic, atraumatic  Neck: supple, no lymphadenopathy  CV: Regular rate & regular rhythm  Lungs: decreased BS at bases, no fremitus  Abdomen: Soft, BS present  Ext: Warm, well perfused  Neuro: non focal, awake  Skin: no rash, no erythema  Lines: no phlebitis    TESTS & MEASUREMENTS:                        9.9    9.24  )-----------( 101      ( 2021 06:59 )             29.6     04-30    138  |  101  |  7<L>  ----------------------------<  102<H>  3.6   |  24  |  0.9    Ca    8.8      2021 06:59  Mg     1.7     04-30    TPro  5.6<L>  /  Alb  3.7  /  TBili  0.7  /  DBili  x   /  AST  16  /  ALT  8   /  AlkPhos  59  04-30      LIVER FUNCTIONS - ( 2021 06:59 )  Alb: 3.7 g/dL / Pro: 5.6 g/dL / ALK PHOS: 59 U/L / ALT: 8 U/L / AST: 16 U/L / GGT: x           Urinalysis Basic - ( 2021 15:29 )    Color: Light Yellow / Appearance: Clear / S.012 / pH: x  Gluc: x / Ketone: Negative  / Bili: Negative / Urobili: <2 mg/dL   Blood: x / Protein: Negative / Nitrite: Negative   Leuk Esterase: Moderate / RBC: 6 /HPF / WBC 6 /HPF   Sq Epi: x / Non Sq Epi: 3 /HPF / Bacteria: Negative        Culture - Sputum (collected 21 @ 17:03)  Source: .Sputum Sputum  Gram Stain (21 @ 07:45):    Numerous polymorphonuclear leukocytes per low power field    Rare Squamous epithelial cells per low power field    Numerous Gram positive cocci in pairs seen per oil power field    Few Gram Variable Rods seen per oil power field    Culture - Blood (collected 21 @ 05:18)  Source: .Blood None  Preliminary Report (21 @ 14:01):    No growth to date.    Culture - Urine (collected 21 @ 11:30)  Source: .Urine Clean Catch (Midstream)  Final Report (21 @ 20:34):    <10,000 CFU/mL Normal Urogenital Dariana    Culture - Urine (collected 21 @ 17:28)  Source: .Urine Clean Catch (Midstream)  Final Report (21 @ 13:28):    >=3 organisms. Probable collection contamination.            INFECTIOUS DISEASES TESTING      RADIOLOGY & ADDITIONAL TESTS:  I have personally reviewed the last Chest xray  CXR  Xray Chest 1 View- PORTABLE-Urgent:   EXAM:  XR CHEST PORTABLE URGENT 1V            PROCEDURE DATE:  2021            INTERPRETATION:  Clinical History/Reason for Exam:  fever    Comparison: XR CHEST 2021.      Findings:    Technique/Positioning:  Frontal radiograph of the chest.    Support devices:  Stable position.    Cardiac/mediastinum/hilum: Stable    Lung parenchyma/ Pleura: Left retrocardiac opacity. No pleural effusions or pneumothorax.      Skeleton/soft tissues: Stable      Impression:    Left retrocardiac opacity.                      JEREMIAH MIRANDA MD; Attending Radiologist  This document has been electronically signed. 2021  9:21AM (21 @ 01:22)      CT  CT Chest No Cont:   EXAM:  CT CHEST            PROCEDURE DATE:  2021            INTERPRETATION:  Reason for Exam:  Postoperative.  CT of the chest was performed from the thoracic inlet to the level of the adrenal glands without contrast injection. Coronal and sagittal images have been submitted.    Comparison: 2020.    Findings:    Tubes/Lines: Patient has a left-sided pacemaker. In the interim, subcutaneous air and some surrounding hematoma is seen adjacent. There is been placement of a left subclavian vein stent with the leads extending through it.    Lungs, Pleura, and Airways:Left lower lobe opacity. Right lower lobe atelectasis.    Mediastinum/Lymph Nodes:No axillary, hilar, mediastinal lymphadenopathy.    Heart/Great Vessels: Heart size is top normal. Trace pericardial fluid is seen.    Abdomen: Please see discussion the abdomen and pelvis in the same day.    Bones and soft tissues: Bones are within normal limits.    IMPRESSION:    Postoperative change with left chest subcutaneous air and some blood. Interval placement of the left subclavian venous stent.                    MANJULA BENITEZ MD; Attending Interventional Radiologist  This document has been electronically signed. 2021  1:32PM (21 @ 10:39)      CARDIOLOGY TESTING  12 Lead ECG:   Ventricular Rate 72 BPM    Atrial Rate 72 BPM    P-R Interval 192 ms    QRS Duration 80 ms    Q-T Interval 388 ms    QTC Calculation(Bazett) 424 ms    P Axis 63 degrees    R Axis 25 degrees    T Axis 12 degrees    Diagnosis Line Normal sinus rhythm  Low voltage QRS  Borderline ECG    Confirmed by MILAD DYKES MD ([id number]) on 2021 9:26:14 AM (21 @ 07:54)  12 Lead ECG:   Ventricular Rate 77 BPM    Atrial Rate 77 BPM    P-R Interval 174 ms    QRS Duration 76 ms    Q-T Interval 376 ms    QTC Calculation(Bazett) 425 ms    P Axis 57 degrees    R Axis 43 degrees    T Axis 22 degrees    Diagnosis Line Sinus rhythm with Premature atrial complexes  Low voltage QRS  Borderline ECG    Confirmed by Hayden Saldana ([id number]) on 2021 7:40:49 AM (21 @ 05:25)      MEDICATIONS  ALBUTerol    90 MICROgram(s) HFA Inhaler 2 Inhalation every 6 hours  aztreonam  IVPB 2000 IV Intermittent every 8 hours  chlorhexidine 4% Liquid 1 Topical <User Schedule>  clopidogrel Tablet 75 Oral daily  doxycycline IVPB 100 IV Intermittent every 12 hours  famotidine    Tablet 20 Oral every 12 hours  folic acid 1 Oral daily  ipratropium 17 MICROgram(s) HFA Inhaler 2 Inhalation every 6 hours  methocarbamol 500 Oral every 8 hours  metoclopramide 10 Oral three times a day  metoprolol succinate ER 50 Oral daily  morphine ER Tablet 120 Oral every 8 hours  morphine ER Tablet 30 Oral <User Schedule>  polyethylene glycol 3350 17 Oral daily  rivaroxaban 20 Oral with dinner  sodium chloride 0.9%. 1000 IV Continuous <Continuous>      Weight  Weight (kg): 82.5 (21 @ 21:18)    ANTIBIOTICS:  aztreonam  IVPB 2000 milliGRAM(s) IV Intermittent every 8 hours  doxycycline IVPB 100 milliGRAM(s) IV Intermittent every 12 hours      ALLERGIES:  Arava (Anaphylaxis; Angioedema)  Avelox (Other)  Plaquenil (Other)  Vantin (Other (Mild))

## 2021-05-01 NOTE — CONSULT NOTE ADULT - ASSESSMENT
ASSESSMENT  49 yo F with hx of SLE, RA, HF sp AICD for primary prevention with subclavian vein stenosis was admitted for laser lead extraction of ICD leads and device and reimplanted with new RA/RV lead and new ICD device (Medtronic) with stenting of subclavian vein    IMPRESSION  #Subclavian vein stenosis  - s/p Subclavian Vein Stenosis sp PCI on 4/27/2021    #HF s/p AICD   - s/p Laser Lead Extraction of ICD Leads and Device, s/p Reimplant of RA/RV Lead and AICD (Medtronic) on 4/27/2021    #Post-op fevers  #Retrocardiac opacitiy   #SLE  #RA  #Obesity BMI (kg/m2): 33.3, 29.8, 29.7, 29.8  #Abx allergy: Arava (Anaphylaxis; Angioedema)  Avelox (Other)  Plaquenil (Other)  Vantin (Other (Mild))    RECOMMENDATIONS  This is a preliminary incomplete pended note, all final recommendations to follow after interview and examination of the patient.    Please call or message on Microsoft Teams if with any questions.  Spectra [id number]   ASSESSMENT  49 yo F with hx of SLE, RA, HF sp AICD for primary prevention with subclavian vein stenosis was admitted for laser lead extraction of ICD leads and device and reimplanted with new RA/RV lead and new ICD device (Medtronic) with stenting of subclavian vein    IMPRESSION  #Subclavian vein stenosis  - s/p Subclavian Vein Stenosis sp PCI on 4/27/2021    #HF s/p AICD   - s/p Laser Lead Extraction of ICD Leads and Device, s/p Reimplant of RA/RV Lead and AICD (Medtronic) on 4/27/2021    #Post-op fevers  #Retrocardiac opacitiy   #Thrombocytopenia    #SLE  #RA  #Obesity BMI (kg/m2): 33.3, 29.8, 29.7, 29.8  #Abx allergy: Arava (Anaphylaxis; Angioedema)  Avelox (Other)  Plaquenil (Other)  Vantin (Other (Mild)) - unclear reaction -- reports facial swelling, but cannot be sure if this was from vantin     RECOMMENDATIONS  - possible aspiration pneumonia/HAP post procedure?  - please check procalcitonin with AM labs  - obtain sputum culture  - continue aztreonam and doxycycline for now   - if procalcitonin elevated, will consider switch to Augmentin to complete antibiotic course    Please call or message on Microsoft Teams if with any questions.  [id number]

## 2021-05-01 NOTE — PROGRESS NOTE ADULT - ASSESSMENT
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 S/p angioplasty with stent placement. Course complicated by post-procedural hypotension.    SICU COURSE:  Pt with Left subclavian vein stenosis s/p elective angioplasty with stent 4/27/21, S/p AICD/PPM and leads extraction and replacement on 4/27/21. Pt sent to CCU for observation post procedure. Hypotension post-procedure - resolved s/p 250ml NS bolus x 2.    Left subclavian vein stenosis   - S/p elective angioplasty with stent placement 4/27/21  - On Vancomycin jelena-operatively (ppx)  - Monitor vitals  - No clinical evidence of bleeding - CT chest/abdomen/pelvis without significant hematoma (small subcutaneous hematoma at the surgery site)  - Monitor H/H - stable  - Per EP, Xarelto 20mg daily restarted 4/30th  - Continue with plavix post left subclavian angioplasty/stent  - F/u Vascular      Suspected Gram Negative PNA - Aspiration PNA post 9hr Surgery - Intubation   - Febrile at 100.9 on 4/29 at midnight, febrile again at 101 on 4/29 at 21:00. Pan cultures sent  - CXR 4/29: increasing LLL retrocardiac opacity  - ID consulted: f/u recs  - IV Abx for now  - f/u Procal ordered for am   - ID note reviewed   - f/u Sputum cxs   - Pain control   - h/x narcotic pain meds dependency     H/o CHF s/p AICD  - Dx in 2011 during a long hospital stay, cause unknown, but required temp pacemaker. Upgraded to AICD due to recurrent Vtach  - S/p replacement of leads on 4/27/21  - F/u EP    Hypotension post-procedure  - s/p 250cc NS bolus x2  - resolved  - BP meds held initially  - Will resume toprol half of home dose today (Toprol 50mg daily) - increase to home dose upon discharge    History of long-QT syndrome and VT   - s/p AICD placement      History of VTE on Xarelto  - Keep Xarelto on hold for now - to be resumed tomorrow as per EP      SLE  - On Xarelto      RA  - Outpatient f/u      COPD  - Continue inhalers and nebs for COPD      Misc  - DVT ppx: Lovenox 40mg, restart Xarelto  - GI ppx:  - Code status: full code    - Dispo: f/u labs / pain control / d/c Monday or Sunday post re-assessment

## 2021-05-01 NOTE — PROGRESS NOTE ADULT - ASSESSMENT
51 yo F with hx of SLE, RA, HF sp AICD for primary prevention with subclavian vein stenosis was admitted for laser lead extraction of ICD leads and device and reimplanted with new RA/RV lead and new ICD device (Medtronic) with stenting of subclavian vein. Patient still with partial old atrial lead in place. Postop with fever, last fever 4/29 PM.     Impression:  Subclavian Vein Stenosis sp PCI on 4/27/2021, POD 3  Laser Lead Extraction of ICD Leads and Device, s/p Reimplant of RA/RV Lead and AICD (Medtronic) on 4/27/2021, POD 4  Postoperative fever on 4/29, unknown origin, Tmax 101 ; ? PNA (retrocardiac opacity on CXR)  Anemia  HFpEF  SLE  RA    Plan:  - F/u cultures  - Encourage use of IS 10x/hour  - OOB to chair, ambulate as tolerated  - Chest PT  - Cont TOprol 50 mg daily  - Cont Xarelto  - Appreciate ID recs  - Trend CBC  - Would start empiric abx therapy  - Cont tele  - Will follow, possible d/c in AM

## 2021-05-01 NOTE — PROGRESS NOTE ADULT - SUBJECTIVE AND OBJECTIVE BOX
INTERVAL HPI/OVERNIGHT EVENTS:  No acute events overnight, no fevers noted since  PM. No WBC.   Patient denies fever, chills, dizziness, syncope, chest pain, palpitations, SOB, cough, abd pain, n/v/d/c, dysuria, hematuria or unusual rash.     MEDICATIONS  (STANDING):  ALBUTerol    90 MICROgram(s) HFA Inhaler 2 Puff(s) Inhalation every 6 hours  aztreonam  IVPB 2000 milliGRAM(s) IV Intermittent every 8 hours  chlorhexidine 4% Liquid 1 Application(s) Topical <User Schedule>  clopidogrel Tablet 75 milliGRAM(s) Oral daily  doxycycline IVPB 100 milliGRAM(s) IV Intermittent every 12 hours  famotidine    Tablet 20 milliGRAM(s) Oral every 12 hours  folic acid 1 milliGRAM(s) Oral daily  ipratropium 17 MICROgram(s) HFA Inhaler 2 Puff(s) Inhalation every 6 hours  methocarbamol 500 milliGRAM(s) Oral every 8 hours  metoclopramide 10 milliGRAM(s) Oral three times a day  metoprolol succinate ER 50 milliGRAM(s) Oral daily  morphine ER Tablet 120 milliGRAM(s) Oral every 8 hours  morphine ER Tablet 30 milliGRAM(s) Oral <User Schedule>  polyethylene glycol 3350 17 Gram(s) Oral daily  rivaroxaban 20 milliGRAM(s) Oral with dinner  sodium chloride 0.9%. 1000 milliLiter(s) (20 mL/Hr) IV Continuous <Continuous>    MEDICATIONS  (PRN):  acetaminophen   Tablet .. 650 milliGRAM(s) Oral every 6 hours PRN Temp greater or equal to 38C (100.4F), Moderate Pain (4 - 6)  ALPRAZolam 1 milliGRAM(s) Oral three times a day PRN Anxiety  morphine  IR 30 milliGRAM(s) Oral every 4 hours PRN Severe Pain (7 - 10)  zolpidem 5 milliGRAM(s) Oral at bedtime PRN Insomnia  zolpidem 5 milliGRAM(s) Oral at bedtime PRN Insomnia      Allergies    Arava (Anaphylaxis; Angioedema)  Avelox (Other)  Plaquenil (Other)  Vantin (Other (Mild))    Intolerances        REVIEW OF SYSTEMS    [x] A ten-point review of systems was otherwise negative except as noted.  [ ] Due to altered mental status/intubation, subjective information were not able to be obtained from the patient. History was obtained, to the extent possible, from review of the chart and collateral sources of information.      Vital Signs Last 24 Hrs  T(C): 37 (01 May 2021 04:59), Max: 37.3 (2021 20:33)  T(F): 98.6 (01 May 2021 04:59), Max: 99.2 (2021 20:33)  HR: 82 (01 May 2021 04:59) (77 - 82)  BP: 129/62 (01 May 2021 04:59) (129/62 - 161/74)  BP(mean): --  RR: 18 (01 May 2021 04:59) (18 - 18)  SpO2: 95% (2021 20:33) (95% - 95%)    21 @ 07:01  -  - @ 07:00  --------------------------------------------------------  IN: 1690 mL / OUT: 1450 mL / NET: 240 mL        Physical Exam  GENERAL: In no apparent distress, well nourished, and hydrated.  HEART: Regular rate and rhythm; No murmurs, rubs, or gallops.  PULMONARY: Clear to auscultation and perfusion.  No rales, wheezing, or rhonchi bilaterally.  Chest: surgical incision c/d/i, no hematoma/drainage noted  ABDOMEN: Soft, Nontender, Nondistended; Bowel sounds present  EXTREMITIES: LUE swelling , improved. Surgical incision c/d/i.  2+ Peripheral Pulses, No clubbing, cyanosis, or edema  NEUROLOGICAL: AO x4, HUNTER, speech clear    LABS:                        9.9    9.24  )-----------( 101      ( 2021 06:59 )             29.6     04-30    138  |  101  |  7<L>  ----------------------------<  102<H>  3.6   |  24  |  0.9    Ca    8.8      2021 06:59  Mg     1.7         TPro  5.6<L>  /  Alb  3.7  /  TBili  0.7  /  DBili  x   /  AST  16  /  ALT  8   /  AlkPhos  59        Urinalysis Basic - ( 2021 15:29 )    Color: Light Yellow / Appearance: Clear / S.012 / pH: x  Gluc: x / Ketone: Negative  / Bili: Negative / Urobili: <2 mg/dL   Blood: x / Protein: Negative / Nitrite: Negative   Leuk Esterase: Moderate / RBC: 6 /HPF / WBC 6 /HPF   Sq Epi: x / Non Sq Epi: 3 /HPF / Bacteria: Negative        21 @ 07:01  -  21 @ 07:00  --------------------------------------------------------  IN: 1690 mL / OUT: 1450 mL / NET: 240 mL        21 @ 07:01  -  21 @ 07:00  --------------------------------------------------------  IN: 1690 mL / OUT: 1450 mL / NET: 240 mL        RADIOLOGY & ADDITIONAL TESTS:  < from: 12 Lead ECG (21 @ 07:25) >  Ventricular Rate 76 BPM    Atrial Rate 76 BPM    P-R Interval 178 ms    QRS Duration 78 ms    Q-T Interval 382 ms    QTC Calculation(Bazett) 429 ms    P Axis 53 degrees    R Axis 18 degrees    T Axis 7 degrees    Diagnosis Line Normal sinus rhythm  Low voltage QRS  Borderline ECG    < end of copied text >      < from: Transthoracic Echocardiogram (19 @ 11:34) >  Summary:   1. Normal global left ventricular systolic function.   2. LV Ejection Fraction by Hendricks's Method with a biplane EF of 63 %.   3. Normal left ventricular internal cavity size.   4. Normal right atrial size.   5. There is no evidence of pericardial effusion.   6. Mild thickening of the anterior and posterior mitral valve leaflets.   7. Peak transaortic gradient equals 8.5 mmHg, mean transaortic gradient   equals 3.7 mmHg, the calculated aortic valve area equals 2.30 cm² by the   continuity equation consistent with mild aortic stenosis.    PHYSICIAN INTERPRETATION:  Left Ventricle: The left ventricular internal cavity size is normal. Left   ventricular wall thickness is normal. Global LV systolic function was   normal.  Right Ventricle: The right ventricular size is normal. RV systolic   function is normal.  Right Atrium: Normal right atrial size.  Pericardium: There is no evidence of pericardial effusion.  Mitral Valve: Mild thickening of the anterior and posterior mitral valve   leaflets. No evidence of mitral valve regurgitation is seen.  Tricuspid Valve: The tricuspid valve is normal in structure. Trivial   tricuspid regurgitation is visualized.  Aortic Valve: The aortic valve is trileaflet. Peak transaortic gradient   equals 8.5 mmHg, mean transaortic gradient equals 3.7 mmHg, the   calculated aortic valve area equals 2.30 cm² by the continuity equation   consistent with mild aortic stenosis. No evidence of aortic valve   regurgitation is seen.  Pulmonic Valve:The pulmonic valve is thickened with good excursion. No   indication of pulmonic valve regurgitation.  Aorta: Aortic root measured at Sinus of Valsalva is normal.  Venous: The inferior vena cava is normal. The inferior vena cava was   normal sized, with respiratory size variation less than 50%.  Additional Comments: A pacer wire is visualized in the right ventricle   and right atrium.    < end of copied text >

## 2021-05-01 NOTE — PROGRESS NOTE ADULT - SUBJECTIVE AND OBJECTIVE BOX
Patient is a 50y old Female who presents with a chief complaint of   No acute events overnight. Patient has no complaints this morning. Denies chest pain, SOB, N/V/D,    Attending note: Pt seen and examined at bedside. No cp and no sob. pt is complaining of a cough with yellow sputum    PAST MEDICAL & SURGICAL HISTORY:    Lupus    RA (rheumatoid arthritis)    HTN (hypertension)    CHF (congestive heart failure)    COPD (chronic obstructive pulmonary disease)    DVT (deep venous thrombosis)    Pulmonary embolism    History of laryngeal cancer    S/P appendectomy    S/P cholecystectomy    AICD (automatic cardioverter/defibrillator) present    S/P hysterectomy    History of back surgery    H/O foot surgery    S/P eye surgery    PHYSICAL EXAM    Vital Signs Last 24 Hrs  T(C): 37 (01 May 2021 04:59), Max: 37.3 (2021 20:33)  T(F): 98.6 (01 May 2021 04:59), Max: 99.2 (2021 20:33)  HR: 82 (01 May 2021 04:59) (79 - 82)  BP: 129/62 (01 May 2021 04:59) (129/62 - 161/74)  RR: 18 (01 May 2021 04:59) (18 - 18)  SpO2: 95% (2021 20:33) (95% - 95%)    GEN: No acute distress  LUNGS: Clear to auscultation bilaterally left ICD  HEART: S1/S2 present. RRR. (-) rub  ABD: Soft, non-tender, non-distended. Bowel sounds present  EXT: NC/NC/NE/2+PP/HUNTER  NEURO: AAOX3      LABS               9.9    9.24  )-----------( 101      ( 2021 06:59 )             29.6     Hemoglobin: 9.9 g/dL ( @ 06:59)  Hemoglobin: 9.2 g/dL ( @ 05:18)  Hemoglobin: 9.7 g/dL ( @ 18:14)  Hemoglobin: 9.2 g/dL ( @ 05:09)  Hemoglobin: 10.8 g/dL ( @ 18:23)        138  |  101  |  7<L>  ----------------------------<  102<H>  3.6   |  24  |  0.9    Ca    8.8      2021 06:59  Mg     1.7         TPro  5.6<L>  /  Alb  3.7  /  TBili  0.7  /  DBili  x   /  AST  16  /  ALT  8   /  AlkPhos  59      Creatinine Trend: 0.9<--, 0.9<--, 0.8<--, 0.8<--, 1.0<--  LIVER FUNCTIONS - ( 2021 06:59 )  Alb: 3.7 g/dL / Pro: 5.6 g/dL / ALK PHOS: 59 U/L / ALT: 8 U/L / AST: 16 U/L / GGT: x           < from: Xray Chest 1 View- PORTABLE-Routine (21 @ 06:41) >    IMPRESSION:    Stable retrocardiac opacity.    < end of copied text >            Urinalysis Basic - ( 2021 06:30 )    Color: Light Yellow / Appearance: Clear / S.012 / pH: x  Gluc: x / Ketone: Negative  / Bili: Negative / Urobili: <2 mg/dL   Blood: x / Protein: Negative / Nitrite: Negative   Leuk Esterase: Negative / RBC: 35 /HPF / WBC 2 /HPF   Sq Epi: x / Non Sq Epi: 0 /HPF / Bacteria: Negative

## 2021-05-02 LAB
ALBUMIN SERPL ELPH-MCNC: 3.6 G/DL — SIGNIFICANT CHANGE UP (ref 3.5–5.2)
ALP SERPL-CCNC: 99 U/L — SIGNIFICANT CHANGE UP (ref 30–115)
ALT FLD-CCNC: 21 U/L — SIGNIFICANT CHANGE UP (ref 0–41)
ANION GAP SERPL CALC-SCNC: 13 MMOL/L — SIGNIFICANT CHANGE UP (ref 7–14)
AST SERPL-CCNC: 38 U/L — SIGNIFICANT CHANGE UP (ref 0–41)
BASOPHILS # BLD AUTO: 0.05 K/UL — SIGNIFICANT CHANGE UP (ref 0–0.2)
BASOPHILS NFR BLD AUTO: 0.6 % — SIGNIFICANT CHANGE UP (ref 0–1)
BILIRUB SERPL-MCNC: 0.6 MG/DL — SIGNIFICANT CHANGE UP (ref 0.2–1.2)
BLD GP AB SCN SERPL QL: SIGNIFICANT CHANGE UP
BUN SERPL-MCNC: 9 MG/DL — LOW (ref 10–20)
CALCIUM SERPL-MCNC: 9.4 MG/DL — SIGNIFICANT CHANGE UP (ref 8.5–10.1)
CHLORIDE SERPL-SCNC: 99 MMOL/L — SIGNIFICANT CHANGE UP (ref 98–110)
CO2 SERPL-SCNC: 22 MMOL/L — SIGNIFICANT CHANGE UP (ref 17–32)
CREAT SERPL-MCNC: 0.8 MG/DL — SIGNIFICANT CHANGE UP (ref 0.7–1.5)
CULTURE RESULTS: SIGNIFICANT CHANGE UP
EOSINOPHIL # BLD AUTO: 0.56 K/UL — SIGNIFICANT CHANGE UP (ref 0–0.7)
EOSINOPHIL NFR BLD AUTO: 7.2 % — SIGNIFICANT CHANGE UP (ref 0–8)
GLUCOSE SERPL-MCNC: 103 MG/DL — HIGH (ref 70–99)
GRAM STN FLD: SIGNIFICANT CHANGE UP
HCT VFR BLD CALC: 27.9 % — LOW (ref 37–47)
HGB BLD-MCNC: 9.3 G/DL — LOW (ref 12–16)
IMM GRANULOCYTES NFR BLD AUTO: 0.6 % — HIGH (ref 0.1–0.3)
LYMPHOCYTES # BLD AUTO: 2.13 K/UL — SIGNIFICANT CHANGE UP (ref 1.2–3.4)
LYMPHOCYTES # BLD AUTO: 27.6 % — SIGNIFICANT CHANGE UP (ref 20.5–51.1)
MAGNESIUM SERPL-MCNC: 1.5 MG/DL — LOW (ref 1.8–2.4)
MCHC RBC-ENTMCNC: 29.2 PG — SIGNIFICANT CHANGE UP (ref 27–31)
MCHC RBC-ENTMCNC: 33.3 G/DL — SIGNIFICANT CHANGE UP (ref 32–37)
MCV RBC AUTO: 87.5 FL — SIGNIFICANT CHANGE UP (ref 81–99)
MONOCYTES # BLD AUTO: 0.97 K/UL — HIGH (ref 0.1–0.6)
MONOCYTES NFR BLD AUTO: 12.5 % — HIGH (ref 1.7–9.3)
NEUTROPHILS # BLD AUTO: 3.97 K/UL — SIGNIFICANT CHANGE UP (ref 1.4–6.5)
NEUTROPHILS NFR BLD AUTO: 51.5 % — SIGNIFICANT CHANGE UP (ref 42.2–75.2)
NRBC # BLD: 0 /100 WBCS — SIGNIFICANT CHANGE UP (ref 0–0)
PLATELET # BLD AUTO: 181 K/UL — SIGNIFICANT CHANGE UP (ref 130–400)
POTASSIUM SERPL-MCNC: 4.1 MMOL/L — SIGNIFICANT CHANGE UP (ref 3.5–5)
POTASSIUM SERPL-SCNC: 4.1 MMOL/L — SIGNIFICANT CHANGE UP (ref 3.5–5)
PROT SERPL-MCNC: 5.8 G/DL — LOW (ref 6–8)
RBC # BLD: 3.19 M/UL — LOW (ref 4.2–5.4)
RBC # FLD: 13 % — SIGNIFICANT CHANGE UP (ref 11.5–14.5)
SODIUM SERPL-SCNC: 134 MMOL/L — LOW (ref 135–146)
SPECIMEN SOURCE: SIGNIFICANT CHANGE UP
SPECIMEN SOURCE: SIGNIFICANT CHANGE UP
WBC # BLD: 7.73 K/UL — SIGNIFICANT CHANGE UP (ref 4.8–10.8)
WBC # FLD AUTO: 7.73 K/UL — SIGNIFICANT CHANGE UP (ref 4.8–10.8)

## 2021-05-02 PROCEDURE — 93010 ELECTROCARDIOGRAM REPORT: CPT

## 2021-05-02 PROCEDURE — 99232 SBSQ HOSP IP/OBS MODERATE 35: CPT

## 2021-05-02 PROCEDURE — 99233 SBSQ HOSP IP/OBS HIGH 50: CPT

## 2021-05-02 RX ORDER — MORPHINE SULFATE 50 MG/1
30 CAPSULE, EXTENDED RELEASE ORAL
Refills: 0 | Status: DISCONTINUED | OUTPATIENT
Start: 2021-05-02 | End: 2021-05-03

## 2021-05-02 RX ORDER — ASPIRIN/CALCIUM CARB/MAGNESIUM 324 MG
81 TABLET ORAL DAILY
Refills: 0 | Status: DISCONTINUED | OUTPATIENT
Start: 2021-05-02 | End: 2021-05-03

## 2021-05-02 RX ADMIN — Medication 110 MILLIGRAM(S): at 17:51

## 2021-05-02 RX ADMIN — Medication 100 MILLIGRAM(S): at 05:22

## 2021-05-02 RX ADMIN — Medication 110 MILLIGRAM(S): at 05:22

## 2021-05-02 RX ADMIN — METHOCARBAMOL 500 MILLIGRAM(S): 500 TABLET, FILM COATED ORAL at 05:22

## 2021-05-02 RX ADMIN — FAMOTIDINE 20 MILLIGRAM(S): 10 INJECTION INTRAVENOUS at 05:22

## 2021-05-02 RX ADMIN — METHOCARBAMOL 500 MILLIGRAM(S): 500 TABLET, FILM COATED ORAL at 13:34

## 2021-05-02 RX ADMIN — Medication 81 MILLIGRAM(S): at 13:38

## 2021-05-02 RX ADMIN — MORPHINE SULFATE 120 MILLIGRAM(S): 50 CAPSULE, EXTENDED RELEASE ORAL at 23:01

## 2021-05-02 RX ADMIN — METHOCARBAMOL 500 MILLIGRAM(S): 500 TABLET, FILM COATED ORAL at 22:14

## 2021-05-02 RX ADMIN — Medication 10 MILLIGRAM(S): at 13:34

## 2021-05-02 RX ADMIN — Medication 1 MILLIGRAM(S): at 11:54

## 2021-05-02 RX ADMIN — Medication 1 MILLIGRAM(S): at 12:13

## 2021-05-02 RX ADMIN — RIVAROXABAN 20 MILLIGRAM(S): KIT at 17:53

## 2021-05-02 RX ADMIN — MORPHINE SULFATE 120 MILLIGRAM(S): 50 CAPSULE, EXTENDED RELEASE ORAL at 22:31

## 2021-05-02 RX ADMIN — Medication 100 MILLIGRAM(S): at 13:33

## 2021-05-02 RX ADMIN — Medication 100 MILLIGRAM(S): at 22:13

## 2021-05-02 RX ADMIN — MORPHINE SULFATE 120 MILLIGRAM(S): 50 CAPSULE, EXTENDED RELEASE ORAL at 13:38

## 2021-05-02 RX ADMIN — POLYETHYLENE GLYCOL 3350 17 GRAM(S): 17 POWDER, FOR SOLUTION ORAL at 11:55

## 2021-05-02 RX ADMIN — Medication 10 MILLIGRAM(S): at 22:14

## 2021-05-02 RX ADMIN — FAMOTIDINE 20 MILLIGRAM(S): 10 INJECTION INTRAVENOUS at 17:52

## 2021-05-02 RX ADMIN — MORPHINE SULFATE 120 MILLIGRAM(S): 50 CAPSULE, EXTENDED RELEASE ORAL at 05:50

## 2021-05-02 RX ADMIN — Medication 10 MILLIGRAM(S): at 05:22

## 2021-05-02 RX ADMIN — Medication 50 MILLIGRAM(S): at 05:22

## 2021-05-02 RX ADMIN — MORPHINE SULFATE 30 MILLIGRAM(S): 50 CAPSULE, EXTENDED RELEASE ORAL at 12:00

## 2021-05-02 RX ADMIN — MORPHINE SULFATE 120 MILLIGRAM(S): 50 CAPSULE, EXTENDED RELEASE ORAL at 06:20

## 2021-05-02 NOTE — PROGRESS NOTE ADULT - ATTENDING COMMENTS
#Progress Note Handoff  Pending (specify): follow up EP, follow ID for pneumonia, follow up procal and cultures   Family discussion: spoke to pt and agreed to plan   Disposition: 3c   Pt seen during COVID 19 Pandemic.
#Progress Note Handoff  Pending (specify): follow up EP, follow ID for pneumonia, follow up procal and cultures   Family discussion: spoke to pt and agreed to plan   Disposition: 3c   Pt seen during COVID 19 Pandemic.
reinstitute beta blocker and pain meds   can go to tele   restart xarelto tomorrow
agree with above
s/p subclav v stent and lead xtraction and reimplantation  await Ct chest and abd and f/u hgb  restart beta as Bp stabilzes
Will be discharged on NAOC and aspirin. FU in the office in 1 month.
long QT
#Progress Note Handoff  Pending (specify): follow up EP, follow ID for pneumonia, follow up procal and cultures   Family discussion: spoke to pt and agreed to plan   Disposition: 3c   Pt seen during COVID 19 Pandemic.
pan culture for fever post extraction and reimplant

## 2021-05-02 NOTE — PROGRESS NOTE ADULT - ASSESSMENT
PCI left subclavian  - cont xarelto and ASA  - DC plavix    Fever  - all Cx negative  - ID input appreciated; chico advice outpatient ABx    - will continue ot FU

## 2021-05-02 NOTE — PROGRESS NOTE ADULT - ASSESSMENT
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 S/p angioplasty with stent placement. Course complicated by post-procedural hypotension.    SICU COURSE:  Pt with Left subclavian vein stenosis s/p elective angioplasty with stent 4/27/21, S/p AICD/PPM and leads extraction and replacement on 4/27/21. Pt sent to CCU for observation post procedure. Hypotension post-procedure - resolved s/p 250ml NS bolus x 2.    Left subclavian vein stenosis   - S/p elective angioplasty with stent placement 4/27/21  - s/p Vancomycin jelena-operatively prophylaxis   - No clinical evidence of bleeding - CT chest/abdomen/pelvis without significant hematoma (small subcutaneous hematoma at the surgery site)  - Monitor H/H - stable  - Per EP, Xarelto 20mg daily restarted 4/30th  - ASA 81mg po qd  - Continue with plavix post left subclavian angioplasty/stent  - F/u Vascular outpt       Suspected Gram Negative PNA - Aspiration PNA post 9hr Surgery - Intubation   - Febrile at 100.9 on 4/29 at midnight, febrile again at 101 on 4/29 at 21:00. Pan cultures sent  - CXR 4/29: increasing LLL retrocardiac opacity  - ID consulted: Note reviewed   - Procal 0.7  - Pain control pt received dilaudid iv once today   - h/x narcotic pain meds dependency     H/o CHF s/p AICD  - Dx in 2011 during a long hospital stay, cause unknown, but required temp pacemaker. Upgraded to AICD due to recurrent Vtach  - S/p replacement of leads on 4/27/21  - F/u EP    Hypotension post-procedure  - s/p 250cc NS bolus x2  - resolved  - BP meds held initially  - Will resume toprol half of home dose today (Toprol 50mg daily) - increase to home dose upon discharge    History of long-QT syndrome and VT   - s/p AICD placement      History of VTE on Xarelto  - Keep Xarelto on hold for now - to be resumed tomorrow as per EP      SLE  - On Xarelto      RA  - Outpatient f/u      COPD  - Continue inhalers and nebs for COPD      Misc  - DVT ppx: Lovenox 40mg, restart Xarelto  - GI ppx:  - Code status: full code    DISPO:  Anticipated for am discharge / Will need Augmentin to complete course of Suspected Aspiration PNA (sputum cxs reviewed - few Klebsiella species/ blood cxs neg). Do not offer anymore IV dilaudid. Pt understands she is being d/kalyan home and anticipated for am (as is not ready today and was not anticipated before). c/w IV Abx today - change augmentin in am and d/c home.   Discussed w/ Vascular and Dr. Bronson - d/c plavix. Continue w/ Xarelto and ASA. ID Recs noted.

## 2021-05-02 NOTE — PROGRESS NOTE ADULT - SUBJECTIVE AND OBJECTIVE BOX
Patient is a 50y old Female who presents with a chief complaint of   No acute events overnight. Patient has no complaints this morning. Denies chest pain, SOB, N/V/D,    Attending note: Pt seen and examined at bedside. No cp and no sob. pt is complaining of a cough with yellow sputum    PAST MEDICAL & SURGICAL HISTORY:    Lupus    RA (rheumatoid arthritis)    HTN (hypertension)    CHF (congestive heart failure)    COPD (chronic obstructive pulmonary disease)    DVT (deep venous thrombosis)    Pulmonary embolism    History of laryngeal cancer    S/P appendectomy    S/P cholecystectomy    AICD (automatic cardioverter/defibrillator) present    S/P hysterectomy    History of back surgery    H/O foot surgery    S/P eye surgery    PHYSICAL EXAM    Vital Signs Last 24 Hrs  T(C): 36.9 (02 May 2021 05:37), Max: 36.9 (02 May 2021 05:37)  T(F): 98.5 (02 May 2021 05:37), Max: 98.5 (02 May 2021 05:37)  HR: 80 (02 May 2021 05:37) (80 - 80)  BP: 108/55 (02 May 2021 05:37) (108/55 - 108/55)  RR: 18 (02 May 2021 05:37) (18 - 18)  SpO2: 93% RA    GEN: No acute distress  LUNGS: Clear to auscultation bilaterally left ICD  HEART: S1/S2 present. RRR. (-) rub  ABD: Soft, non-tender, non-distended. Bowel sounds present  EXT: NC/NC/NE/2+PP/HUNTER  NEURO: AAOX3      LABS                          9.3    7.73  )-----------( 181      ( 02 May 2021 06:11 )             27.9     05-    134<L>  |  99  |  9<L>  ----------------------------<  103<H>  4.1   |  22  |  0.8    Ca    9.4      02 May 2021 06:11  Mg     1.5         TPro  5.8<L>  /  Alb  3.6  /  TBili  0.6  /  DBili  x   /  AST  38  /  ALT  21  /  AlkPhos  99  -    Hemoglobin: 9.9 g/dL ( @ 06:59)  Hemoglobin: 9.2 g/dL ( @ 05:18)  Hemoglobin: 9.7 g/dL ( @ 18:14)  Hemoglobin: 9.2 g/dL ( @ 05:09)  Hemoglobin: 10.8 g/dL ( @ 18:23)    Creatinine Trend: 0.9<--, 0.9<--, 0.8<--, 0.8<--, 1.0<--  LIVER FUNCTIONS - ( 2021 06:59 )  Alb: 3.7 g/dL / Pro: 5.6 g/dL / ALK PHOS: 59 U/L / ALT: 8 U/L / AST: 16 U/L / GGT: x           < from: Xray Chest 1 View- PORTABLE-Routine (21 @ 06:41) >    IMPRESSION:    Stable retrocardiac opacity.    < end of copied text >    Urinalysis Basic - ( 2021 06:30 )    Color: Light Yellow / Appearance: Clear / S.012 / pH: x  Gluc: x / Ketone: Negative  / Bili: Negative / Urobili: <2 mg/dL   Blood: x / Protein: Negative / Nitrite: Negative   Leuk Esterase: Negative / RBC: 35 /HPF / WBC 2 /HPF   Sq Epi: x / Non Sq Epi: 0 /HPF / Bacteria: Negative

## 2021-05-02 NOTE — PROGRESS NOTE ADULT - SUBJECTIVE AND OBJECTIVE BOX
INTERVAL HPI/OVERNIGHT EVENTS:    patient is doing well overnight. No further fevers. Left arm swelling significantly decreased of the procedure.    MEDICATIONS  (STANDING):  ALBUTerol    90 MICROgram(s) HFA Inhaler 2 Puff(s) Inhalation every 6 hours  aspirin enteric coated 81 milliGRAM(s) Oral daily  aztreonam  IVPB 2000 milliGRAM(s) IV Intermittent every 8 hours  chlorhexidine 4% Liquid 1 Application(s) Topical <User Schedule>  doxycycline IVPB 100 milliGRAM(s) IV Intermittent every 12 hours  famotidine    Tablet 20 milliGRAM(s) Oral every 12 hours  folic acid 1 milliGRAM(s) Oral daily  ipratropium 17 MICROgram(s) HFA Inhaler 2 Puff(s) Inhalation every 6 hours  methocarbamol 500 milliGRAM(s) Oral every 8 hours  metoclopramide 10 milliGRAM(s) Oral three times a day  metoprolol succinate ER 50 milliGRAM(s) Oral daily  morphine ER Tablet 120 milliGRAM(s) Oral every 8 hours  polyethylene glycol 3350 17 Gram(s) Oral daily  rivaroxaban 20 milliGRAM(s) Oral with dinner  sodium chloride 0.9%. 1000 milliLiter(s) (20 mL/Hr) IV Continuous <Continuous>    MEDICATIONS  (PRN):  acetaminophen   Tablet .. 650 milliGRAM(s) Oral every 6 hours PRN Temp greater or equal to 38C (100.4F), Moderate Pain (4 - 6)  ALPRAZolam 1 milliGRAM(s) Oral three times a day PRN Anxiety  morphine ER Tablet 30 milliGRAM(s) Oral <User Schedule> PRN break through pain  oxyCODONE    IR 5 milliGRAM(s) Oral every 6 hours PRN Severe Pain (7 - 10)  zolpidem 5 milliGRAM(s) Oral at bedtime PRN Insomnia  zolpidem 5 milliGRAM(s) Oral at bedtime PRN Insomnia      Allergies    Arava (Anaphylaxis; Angioedema)  Avelox (Other)  Plaquenil (Other)  Vantin (Other (Mild))    Intolerances      Vital Signs Last 24 Hrs  T(C): 36.9 (02 May 2021 05:37), Max: 36.9 (02 May 2021 05:37)  T(F): 98.5 (02 May 2021 05:37), Max: 98.5 (02 May 2021 05:37)  HR: 80 (02 May 2021 05:37) (80 - 80)  BP: 108/55 (02 May 2021 05:37) (108/55 - 108/55)  BP(mean): --  RR: 18 (02 May 2021 05:37) (18 - 18)  SpO2: --    21 @ 07:01  -  21 @ 07:00  --------------------------------------------------------  IN: 700 mL / OUT: 1200 mL / NET: -500 mL        Physical Exam    GENERAL: In no apparent distress, well nourished, and hydrated.  HEAD:  Atraumatic, Normocephalic  EYES: EOMI, PERRLA, conjunctiva and sclera clear  NECK: Supple and normal thyroid.  No JVD or carotid bruit.  Carotid pulse is 2+ bilaterally.  HEART: Regular rate and rhythm; No murmurs, rubs, or gallops.  PULMONARY: Clear to auscultation and perfusion.  No rales, wheezing, or rhonchi bilaterally.  ABDOMEN: Soft, Nontender, Nondistended; Bowel sounds present  EXTREMITIES:  2+ Peripheral Pulses, No clubbing, cyanosis, or edema  LYMPH: No lymphadenopathy noted left arm sewlling impoved  NEUROLOGICAL: Grossly nonfocal    LABS:                        9.3    7.73  )-----------( 181      ( 02 May 2021 06:11 )             27.9     05-    134<L>  |  99  |  9<L>  ----------------------------<  103<H>  4.1   |  22  |  0.8    Ca    9.4      02 May 2021 06:11  Mg     1.5     05-    TPro  5.8<L>  /  Alb  3.6  /  TBili  0.6  /  DBili  x   /  AST  38  /  ALT  21  /  AlkPhos  99  05-02      Urinalysis Basic - ( 2021 15:29 )    Color: Light Yellow / Appearance: Clear / S.012 / pH: x  Gluc: x / Ketone: Negative  / Bili: Negative / Urobili: <2 mg/dL   Blood: x / Protein: Negative / Nitrite: Negative   Leuk Esterase: Moderate / RBC: 6 /HPF / WBC 6 /HPF   Sq Epi: x / Non Sq Epi: 3 /HPF / Bacteria: Negative        RADIOLOGY & ADDITIONAL TESTS:

## 2021-05-03 ENCOUNTER — TRANSCRIPTION ENCOUNTER (OUTPATIENT)
Age: 51
End: 2021-05-03

## 2021-05-03 VITALS
TEMPERATURE: 99 F | RESPIRATION RATE: 18 BRPM | SYSTOLIC BLOOD PRESSURE: 156 MMHG | DIASTOLIC BLOOD PRESSURE: 80 MMHG | HEART RATE: 88 BPM

## 2021-05-03 LAB
ALBUMIN SERPL ELPH-MCNC: 3.6 G/DL — SIGNIFICANT CHANGE UP (ref 3.5–5.2)
ALP SERPL-CCNC: 91 U/L — SIGNIFICANT CHANGE UP (ref 30–115)
ALT FLD-CCNC: 15 U/L — SIGNIFICANT CHANGE UP (ref 0–41)
ANION GAP SERPL CALC-SCNC: 9 MMOL/L — SIGNIFICANT CHANGE UP (ref 7–14)
AST SERPL-CCNC: 16 U/L — SIGNIFICANT CHANGE UP (ref 0–41)
BASOPHILS # BLD AUTO: 0.06 K/UL — SIGNIFICANT CHANGE UP (ref 0–0.2)
BASOPHILS NFR BLD AUTO: 0.8 % — SIGNIFICANT CHANGE UP (ref 0–1)
BILIRUB SERPL-MCNC: 0.5 MG/DL — SIGNIFICANT CHANGE UP (ref 0.2–1.2)
BUN SERPL-MCNC: 15 MG/DL — SIGNIFICANT CHANGE UP (ref 10–20)
CALCIUM SERPL-MCNC: 9.5 MG/DL — SIGNIFICANT CHANGE UP (ref 8.5–10.1)
CHLORIDE SERPL-SCNC: 101 MMOL/L — SIGNIFICANT CHANGE UP (ref 98–110)
CO2 SERPL-SCNC: 27 MMOL/L — SIGNIFICANT CHANGE UP (ref 17–32)
CREAT SERPL-MCNC: 0.8 MG/DL — SIGNIFICANT CHANGE UP (ref 0.7–1.5)
EOSINOPHIL # BLD AUTO: 0.6 K/UL — SIGNIFICANT CHANGE UP (ref 0–0.7)
EOSINOPHIL NFR BLD AUTO: 7.7 % — SIGNIFICANT CHANGE UP (ref 0–8)
GLUCOSE SERPL-MCNC: 94 MG/DL — SIGNIFICANT CHANGE UP (ref 70–99)
HCT VFR BLD CALC: 28.9 % — LOW (ref 37–47)
HGB BLD-MCNC: 9.8 G/DL — LOW (ref 12–16)
IMM GRANULOCYTES NFR BLD AUTO: 0.6 % — HIGH (ref 0.1–0.3)
LYMPHOCYTES # BLD AUTO: 2.4 K/UL — SIGNIFICANT CHANGE UP (ref 1.2–3.4)
LYMPHOCYTES # BLD AUTO: 30.9 % — SIGNIFICANT CHANGE UP (ref 20.5–51.1)
MAGNESIUM SERPL-MCNC: 1.6 MG/DL — LOW (ref 1.8–2.4)
MCHC RBC-ENTMCNC: 29.3 PG — SIGNIFICANT CHANGE UP (ref 27–31)
MCHC RBC-ENTMCNC: 33.9 G/DL — SIGNIFICANT CHANGE UP (ref 32–37)
MCV RBC AUTO: 86.3 FL — SIGNIFICANT CHANGE UP (ref 81–99)
MONOCYTES # BLD AUTO: 1.02 K/UL — HIGH (ref 0.1–0.6)
MONOCYTES NFR BLD AUTO: 13.1 % — HIGH (ref 1.7–9.3)
NEUTROPHILS # BLD AUTO: 3.64 K/UL — SIGNIFICANT CHANGE UP (ref 1.4–6.5)
NEUTROPHILS NFR BLD AUTO: 46.9 % — SIGNIFICANT CHANGE UP (ref 42.2–75.2)
NRBC # BLD: 0 /100 WBCS — SIGNIFICANT CHANGE UP (ref 0–0)
PLATELET # BLD AUTO: 205 K/UL — SIGNIFICANT CHANGE UP (ref 130–400)
POTASSIUM SERPL-MCNC: 4.4 MMOL/L — SIGNIFICANT CHANGE UP (ref 3.5–5)
POTASSIUM SERPL-SCNC: 4.4 MMOL/L — SIGNIFICANT CHANGE UP (ref 3.5–5)
PROCALCITONIN SERPL-MCNC: 0.05 NG/ML — SIGNIFICANT CHANGE UP (ref 0.02–0.1)
PROT SERPL-MCNC: 5.8 G/DL — LOW (ref 6–8)
RBC # BLD: 3.35 M/UL — LOW (ref 4.2–5.4)
RBC # FLD: 13 % — SIGNIFICANT CHANGE UP (ref 11.5–14.5)
SODIUM SERPL-SCNC: 137 MMOL/L — SIGNIFICANT CHANGE UP (ref 135–146)
WBC # BLD: 7.77 K/UL — SIGNIFICANT CHANGE UP (ref 4.8–10.8)
WBC # FLD AUTO: 7.77 K/UL — SIGNIFICANT CHANGE UP (ref 4.8–10.8)

## 2021-05-03 PROCEDURE — 73030 X-RAY EXAM OF SHOULDER: CPT | Mod: 26,LT

## 2021-05-03 PROCEDURE — 99233 SBSQ HOSP IP/OBS HIGH 50: CPT

## 2021-05-03 PROCEDURE — 93010 ELECTROCARDIOGRAM REPORT: CPT

## 2021-05-03 RX ORDER — FOLIC ACID 0.8 MG
1 TABLET ORAL
Qty: 0 | Refills: 0 | DISCHARGE

## 2021-05-03 RX ORDER — ZOLPIDEM TARTRATE 10 MG/1
1 TABLET ORAL
Qty: 0 | Refills: 0 | DISCHARGE

## 2021-05-03 RX ORDER — ALBUTEROL 90 UG/1
2 AEROSOL, METERED ORAL
Qty: 0 | Refills: 0 | DISCHARGE

## 2021-05-03 RX ORDER — MORPHINE SULFATE 50 MG/1
1 CAPSULE, EXTENDED RELEASE ORAL
Qty: 0 | Refills: 0 | DISCHARGE

## 2021-05-03 RX ORDER — METOPROLOL TARTRATE 50 MG
1 TABLET ORAL
Qty: 0 | Refills: 0 | DISCHARGE
Start: 2021-05-03

## 2021-05-03 RX ORDER — ASPIRIN/CALCIUM CARB/MAGNESIUM 324 MG
1 TABLET ORAL
Qty: 30 | Refills: 0
Start: 2021-05-03 | End: 2021-06-01

## 2021-05-03 RX ORDER — ALPRAZOLAM 0.25 MG
2 TABLET ORAL
Qty: 0 | Refills: 0 | DISCHARGE
Start: 2021-05-03

## 2021-05-03 RX ORDER — ALBUTEROL 90 UG/1
2 AEROSOL, METERED ORAL
Qty: 0 | Refills: 0 | DISCHARGE
Start: 2021-05-03

## 2021-05-03 RX ORDER — MAGNESIUM SULFATE 500 MG/ML
2 VIAL (ML) INJECTION ONCE
Refills: 0 | Status: COMPLETED | OUTPATIENT
Start: 2021-05-03 | End: 2021-05-03

## 2021-05-03 RX ORDER — METOCLOPRAMIDE HCL 10 MG
1 TABLET ORAL
Qty: 0 | Refills: 0 | DISCHARGE
Start: 2021-05-03

## 2021-05-03 RX ORDER — MORPHINE SULFATE 50 MG/1
1 CAPSULE, EXTENDED RELEASE ORAL
Qty: 0 | Refills: 0 | DISCHARGE
Start: 2021-05-03

## 2021-05-03 RX ORDER — FOLIC ACID 0.8 MG
1 TABLET ORAL
Qty: 0 | Refills: 0 | DISCHARGE
Start: 2021-05-03

## 2021-05-03 RX ORDER — METOCLOPRAMIDE HCL 10 MG
1 TABLET ORAL
Qty: 0 | Refills: 0 | DISCHARGE

## 2021-05-03 RX ORDER — RIVAROXABAN 15 MG-20MG
1 KIT ORAL
Qty: 0 | Refills: 0 | DISCHARGE

## 2021-05-03 RX ORDER — MORPHINE SULFATE 50 MG/1
2 CAPSULE, EXTENDED RELEASE ORAL
Qty: 0 | Refills: 0 | DISCHARGE
Start: 2021-05-03

## 2021-05-03 RX ORDER — METHOCARBAMOL 500 MG/1
1 TABLET, FILM COATED ORAL
Qty: 90 | Refills: 0
Start: 2021-05-03 | End: 2021-06-01

## 2021-05-03 RX ORDER — ALPRAZOLAM 0.25 MG
1 TABLET ORAL
Qty: 0 | Refills: 0 | DISCHARGE
Start: 2021-05-03

## 2021-05-03 RX ORDER — ZOLPIDEM TARTRATE 10 MG/1
1 TABLET ORAL
Qty: 0 | Refills: 0 | DISCHARGE
Start: 2021-05-03

## 2021-05-03 RX ORDER — METOPROLOL TARTRATE 50 MG
2 TABLET ORAL
Qty: 0 | Refills: 0 | DISCHARGE

## 2021-05-03 RX ORDER — ALPRAZOLAM 0.25 MG
1 TABLET ORAL
Qty: 0 | Refills: 0 | DISCHARGE

## 2021-05-03 RX ORDER — RIVAROXABAN 15 MG-20MG
1 KIT ORAL
Qty: 0 | Refills: 0 | DISCHARGE
Start: 2021-05-03

## 2021-05-03 RX ADMIN — Medication 2 PUFF(S): at 07:44

## 2021-05-03 RX ADMIN — MORPHINE SULFATE 120 MILLIGRAM(S): 50 CAPSULE, EXTENDED RELEASE ORAL at 06:31

## 2021-05-03 RX ADMIN — FAMOTIDINE 20 MILLIGRAM(S): 10 INJECTION INTRAVENOUS at 05:47

## 2021-05-03 RX ADMIN — MORPHINE SULFATE 120 MILLIGRAM(S): 50 CAPSULE, EXTENDED RELEASE ORAL at 13:35

## 2021-05-03 RX ADMIN — Medication 81 MILLIGRAM(S): at 11:16

## 2021-05-03 RX ADMIN — Medication 100 MILLIGRAM(S): at 05:48

## 2021-05-03 RX ADMIN — Medication 10 MILLIGRAM(S): at 13:35

## 2021-05-03 RX ADMIN — Medication 25 GRAM(S): at 10:31

## 2021-05-03 RX ADMIN — Medication 110 MILLIGRAM(S): at 06:20

## 2021-05-03 RX ADMIN — Medication 50 MILLIGRAM(S): at 05:47

## 2021-05-03 RX ADMIN — ALBUTEROL 2 PUFF(S): 90 AEROSOL, METERED ORAL at 07:43

## 2021-05-03 RX ADMIN — Medication 10 MILLIGRAM(S): at 05:47

## 2021-05-03 RX ADMIN — METHOCARBAMOL 500 MILLIGRAM(S): 500 TABLET, FILM COATED ORAL at 05:47

## 2021-05-03 RX ADMIN — METHOCARBAMOL 500 MILLIGRAM(S): 500 TABLET, FILM COATED ORAL at 13:35

## 2021-05-03 RX ADMIN — Medication 1 MILLIGRAM(S): at 11:16

## 2021-05-03 RX ADMIN — MORPHINE SULFATE 120 MILLIGRAM(S): 50 CAPSULE, EXTENDED RELEASE ORAL at 06:01

## 2021-05-03 NOTE — PROGRESS NOTE ADULT - PROVIDER SPECIALTY LIST ADULT
Electrophysiology
Internal Medicine
Internal Medicine
CCU
CCU
Electrophysiology
Hospitalist
Vascular Surgery
Electrophysiology
Electrophysiology
Hospitalist

## 2021-05-03 NOTE — PROGRESS NOTE ADULT - ASSESSMENT
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 S/p angioplasty with stent placement. Course complicated by post-procedural hypotension.    Left subclavian vein stenosis     - S/p elective angioplasty with stent placement 4/27/21  - No clinical evidence of bleeding - CT chest/abdomen/pelvis without significant hematoma (small subcutaneous hematoma at the surgery site)  - Per EP, Xarelto 20mg daily restarted today  - F/u Vascular      suspected gram neg pneumonia     - Febrile at 100.9 on 4/29 at midnight, febrile again at 101 on 4/29 at 21:00. Pan cultures sent  - CXR 4/29: increasing LLL retrocardiac opacity  - ID on board  - procal 0.07  - sp aztreonam and doxycylcine  - sputum culture gram + cocci  - no need for Ab on discharge      History of long-QT syndrome and VT sp AICD    - EF 63% 2019  - EP on board  - s/p AICD placement      History of VTE on Xarelto    - cw xareto    SLE    - not in flare  - On Xarelto      RA    - Outpatient f/u      COPD    - not in exacerbation  - Continue inhalers and nebs for COPD        - DVT ppx: Xarelto  - Code status: full code  - Diet soft    48F with PMH of lupus with lupus anticoagulant, rheumatoid arthritis, CHF s/p AICD+PPM, DVT/PE in 2015 on Xarelto, NSTEMI in 2015, presented for left subclavian vein stenosis. S/p angioplasty with stent placement. Course complicated by post-procedural hypotension.    Left subclavian vein stenosis     - S/p elective angioplasty with stent placement 4/27/21  - No clinical evidence of bleeding - CT chest/abdomen/pelvis without significant hematoma (small subcutaneous hematoma at the surgery site)  - Per EP, Xarelto 20mg daily restarted today  - F/u Vascular      suspected gram neg pneumonia     - Febrile at 100.9 on 4/29 at midnight, febrile again at 101 on 4/29 at 21:00. Pan cultures sent  - CXR 4/29: increasing LLL retrocardiac opacity  - ID on board  - procal 0.07  - sp aztreonam and doxycylcine  - sputum culture gram + cocci  - no need for Ab on discharge      History of long-QT syndrome and VT sp AICD  CHFpEF    - EF 63% 2019  - EP on board  - s/p AICD placement      History of VTE on Xarelto    - cw xareto    SLE    - not in flare  - On Xarelto      RA    - Outpatient f/u      COPD    - not in exacerbation  - Continue inhalers and nebs for COPD        - DVT ppx: Xarelto  - Code status: full code  - Diet soft

## 2021-05-03 NOTE — DISCHARGE NOTE PROVIDER - NSDCFUSCHEDAPPT_GEN_ALL_CORE_FT
ILEANA MCNEIL ; 05/24/2021 ; NPP Surg Vasc [facility]  ILEANA MCNEIL ; 06/01/2021 ; NPP Cardio [address] [facility]

## 2021-05-03 NOTE — DISCHARGE NOTE PROVIDER - PROVIDER TOKENS
PROVIDER:[TOKEN:[[id number]],FOLLOWUP:[1 week]],PROVIDER:[TOKEN:[[id number]],FOLLOWUP:[2 weeks]],PROVIDER:[TOKEN:[[id number]],FOLLOWUP:[1 week]]

## 2021-05-03 NOTE — DISCHARGE NOTE PROVIDER - CARE PROVIDER_API CALL
Didier Matta  Podiatric Medicine and Surgery  [address] [place] [address]  Phone: [phone]  Fax: [phone]  Follow Up Time: 1 week    Jason Eaton; PADMINI)  Electrophysiology Center  [address] [place] [address]  Phone: [phone]  Fax: [phone]  Follow Up Time: 2 weeks    Tamie Bryson)  Surgery  [address] [place] [address]  Phone: [phone]  Fax: [phone]  Follow Up Time: 1 week

## 2021-05-03 NOTE — DISCHARGE NOTE PROVIDER - NSDCMRMEDTOKEN_GEN_ALL_CORE_FT
albuterol 90 mcg/inh inhalation aerosol: 2 puff(s) inhaled every 6 hours  ALPRAZolam 1 mg oral tablet: 1 tab(s) orally 3 times a day, As needed, Anxiety  aspirin 81 mg oral delayed release tablet: 1 tab(s) orally once a day  baclofen 20 mg oral tablet: 1 tab(s) orally 2 times a day  Benlysta: 200 milligram(s) intravenous once a week  cyanocobalamin 1000 mcg/mL injectable solution: 1 milliliter(s) injectable once a week  folic acid 1 mg oral tablet: 1 tab(s) orally once a day  hydrALAZINE 50 mg oral tablet: 1 tab(s) orally once a day  methocarbamol 500 mg oral tablet: 1 tab(s) orally every 8 hours  metoclopramide 10 mg oral tablet: 1 tab(s) orally 3 times a day  metoprolol succinate 50 mg oral tablet, extended release: 1 tab(s) orally once a day  morphine 30 mg/8 to 12 hr oral tablet, extended release: 1 tab(s) orally , As needed, break through pain  morphine 60 mg/8 to 12 hr oral tablet, extended release: 2 tab(s) orally every 8 hours  rivaroxaban 20 mg oral tablet: 1 tab(s) orally once a day (before a meal)  Trelegy Ellipta 100 mcg-62.5 mcg-25 mcg/inh inhalation powder: 1 puff(s) inhaled once a day  zolpidem 5 mg oral tablet: 1 tab(s) orally once a day (at bedtime), As needed, Insomnia  zolpidem 5 mg oral tablet: 1 tab(s) orally once a day (at bedtime), As needed, Insomnia

## 2021-05-03 NOTE — DISCHARGE NOTE PROVIDER - HOSPITAL COURSE
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 S/p angioplasty with stent placement. Course complicated by post-procedural hypotension.    Left subclavian vein stenosis     - S/p elective angioplasty with stent placement 4/27/21  - No clinical evidence of bleeding - CT chest/abdomen/pelvis without significant hematoma (small subcutaneous hematoma at the surgery site)  - Per EP, Xarelto 20mg daily restarted today  - F/u Vascular      suspected gram neg pneumonia     - Febrile at 100.9 on 4/29 at midnight, febrile again at 101 on 4/29 at 21:00. Pan cultures sent  - CXR 4/29: increasing LLL retrocardiac opacity  - ID on board  - procal 0.07  - sp aztreonam and doxycylcine  - sputum culture gram + cocci  - no need for Ab on discharge      History of long-QT syndrome and VT sp AICD    - EF 63% 2019  - EP on board  - s/p AICD placement      History of VTE on Xarelto    - cw xareto    SLE    - not in flare  - On Xarelto      RA    - Outpatient f/u      COPD    - not in exacerbation  - Continue inhalers and nebs for COPD 48F with PMH of lupus with lupus anticoagulant, rheumatoid arthritis, CHF s/p AICD+PPM, DVT/PE in 2015 on Xarelto, NSTEMI in 2015, presented for left subclavian vein stenosis. S/p angioplasty with stent placement. Course complicated by post-procedural hypotension.    Left subclavian vein stenosis     - S/p elective angioplasty with stent placement 4/27/21  - No clinical evidence of bleeding - CT chest/abdomen/pelvis without significant hematoma (small subcutaneous hematoma at the surgery site)  - Per EP, Xarelto 20mg daily restarted today  - F/u Vascular      suspected gram neg pneumonia     - Febrile at 100.9 on 4/29 at midnight, febrile again at 101 on 4/29 at 21:00. Pan cultures sent  - CXR 4/29: increasing LLL retrocardiac opacity  - ID on board  - procal 0.07  - sp aztreonam and doxycylcine  - sputum culture gram + cocci  - no need for Ab on discharge      History of long-QT syndrome and VT sp AICD    - EF 63% 2019  - EP on board  - s/p AICD placement      History of VTE on Xarelto    - cw xareto    SLE    - not in flare  - On Xarelto      RA    - Outpatient f/u      COPD    - not in exacerbation  - Continue inhalers and nebs for COPD    Attending Attestation:  Pt seen and examined. Case and Plan discussed at rounds and agree with this d/c summary.  d/c home   low procal   d/c abc  d/c home today   f/u Dr Miller / Pulmonary and PMD / Vascular     Vital Signs Last 24 Hrs  T(C): 37.3 (03 May 2021 12:28), Max: 37.6 (02 May 2021 20:18)  T(F): 99.1 (03 May 2021 12:28), Max: 99.7 (02 May 2021 20:18)  HR: 88 (03 May 2021 12:28) (73 - 88)  BP: 156/80 (03 May 2021 12:28) (90/54 - 160/81)  RR: 18 (03 May 2021 12:28) (17 - 18)  SpO2 96 % RA                          9.8    7.77  )-----------( 205      ( 03 May 2021 05:54 )             28.9     05-03    137  |  101  |  15  ----------------------------<  94  4.4   |  27  |  0.8    Ca    9.5      03 May 2021 05:54  Mg     1.6     05-03    TPro  5.8<L>  /  Alb  3.6  /  TBili  0.5  /  DBili  x   /  AST  16  /  ALT  15  /  AlkPhos  91  05-03    HypoMagnesemia  Fever 2/2 Suspected Aspiration PNA s/p 4 days IV abx - blood and sputum cxs neg  Left SC Stenosis s/p angioplasty   AICD Lead Extraction and Re-insertion   Chronic Pain Syndrome     Meds: per medrec

## 2021-05-03 NOTE — DISCHARGE NOTE PROVIDER - NSDCCPCAREPLAN_GEN_ALL_CORE_FT
PRINCIPAL DISCHARGE DIAGNOSIS  Diagnosis: Subclavian vein stenosis  Assessment and Plan of Treatment: - S/p elective angioplasty with stent placement 4/27/21  - cw Aspirin and xarelto  - No clinical evidence of bleeding - CT chest/abdomen/pelvis without significant hematoma (small subcutaneous hematoma at the surgery site)  - F/u Vascular surgery as outpatient  - follow up electrophysiology AICD as outpatient   - avoid heavy lifting left arm         PRINCIPAL DISCHARGE DIAGNOSIS  Diagnosis: Subclavian vein stenosis  Assessment and Plan of Treatment: - S/p elective angioplasty with stent placement 4/27/21  - cw Aspirin and xarelto  - No clinical evidence of bleeding - CT chest/abdomen/pelvis without significant hematoma (small subcutaneous hematoma at the surgery site)  - F/u Vascular surgery as outpatient  - follow up electrophysiology AICD as outpatient   - avoid heavy lifting left arm        SECONDARY DISCHARGE DIAGNOSES  Diagnosis: Pneumonia, aspiration  Assessment and Plan of Treatment: treated with IV antibiotics for 4 days  no need for home antibiotic  procalcitonin 0.07

## 2021-05-03 NOTE — DISCHARGE NOTE NURSING/CASE MANAGEMENT/SOCIAL WORK - PATIENT PORTAL LINK FT
You can access the FollowMyHealth Patient Portal offered by [facility] by registering at the following website: [email or web address]. By joining [facility]’s FollowMyHealth portal, you will also be able to view your health information using other applications (apps) compatible with our system.

## 2021-05-03 NOTE — DISCHARGE NOTE PROVIDER - CARE PROVIDERS DIRECT ADDRESSES
,DirectAddress_Unknown,[email or web address],DirectAddress_Unknown

## 2021-05-03 NOTE — PROGRESS NOTE ADULT - NSICDXPILOT_GEN_ALL_CORE
[place]

## 2021-05-04 LAB
CULTURE RESULTS: SIGNIFICANT CHANGE UP
CULTURE RESULTS: SIGNIFICANT CHANGE UP
S PNEUM AG UR QL: NEGATIVE — SIGNIFICANT CHANGE UP
SPECIMEN SOURCE: SIGNIFICANT CHANGE UP
SPECIMEN SOURCE: SIGNIFICANT CHANGE UP

## 2021-05-05 LAB
CULTURE RESULTS: SIGNIFICANT CHANGE UP
SPECIMEN SOURCE: SIGNIFICANT CHANGE UP

## 2021-05-13 ENCOUNTER — APPOINTMENT (OUTPATIENT)
Dept: VASCULAR SURGERY | Facility: CLINIC | Age: 51
End: 2021-05-13
Payer: MEDICARE

## 2021-05-13 VITALS
DIASTOLIC BLOOD PRESSURE: 95 MMHG | BODY MASS INDEX: 30.12 KG/M2 | SYSTOLIC BLOOD PRESSURE: 137 MMHG | WEIGHT: 170 LBS | HEIGHT: 63 IN | TEMPERATURE: 95.6 F | HEART RATE: 111 BPM
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
## 2021-05-13 DIAGNOSIS — M06.9 RHEUMATOID ARTHRITIS, UNSPECIFIED: ICD-10-CM

## 2021-05-13 DIAGNOSIS — J15.6 PNEUMONIA DUE TO OTHER GRAM-NEGATIVE BACTERIA: ICD-10-CM

## 2021-05-13 DIAGNOSIS — Z97.2 PRESENCE OF DENTAL PROSTHETIC DEVICE (COMPLETE) (PARTIAL): ICD-10-CM

## 2021-05-13 DIAGNOSIS — I11.0 HYPERTENSIVE HEART DISEASE WITH HEART FAILURE: ICD-10-CM

## 2021-05-13 DIAGNOSIS — I25.2 OLD MYOCARDIAL INFARCTION: ICD-10-CM

## 2021-05-13 DIAGNOSIS — G89.4 CHRONIC PAIN SYNDROME: ICD-10-CM

## 2021-05-13 DIAGNOSIS — Z85.21 PERSONAL HISTORY OF MALIGNANT NEOPLASM OF LARYNX: ICD-10-CM

## 2021-05-13 DIAGNOSIS — I87.1 COMPRESSION OF VEIN: ICD-10-CM

## 2021-05-13 DIAGNOSIS — Z88.8 ALLERGY STATUS TO OTHER DRUGS, MEDICAMENTS AND BIOLOGICAL SUBSTANCES STATUS: ICD-10-CM

## 2021-05-13 DIAGNOSIS — J69.0 PNEUMONITIS DUE TO INHALATION OF FOOD AND VOMIT: ICD-10-CM

## 2021-05-13 DIAGNOSIS — I95.9 HYPOTENSION, UNSPECIFIED: ICD-10-CM

## 2021-05-13 DIAGNOSIS — Z80.8 FAMILY HISTORY OF MALIGNANT NEOPLASM OF OTHER ORGANS OR SYSTEMS: ICD-10-CM

## 2021-05-13 DIAGNOSIS — M32.9 SYSTEMIC LUPUS ERYTHEMATOSUS, UNSPECIFIED: ICD-10-CM

## 2021-05-13 DIAGNOSIS — L76.32 POSTPROCEDURAL HEMATOMA OF SKIN AND SUBCUTANEOUS TISSUE FOLLOWING OTHER PROCEDURE: ICD-10-CM

## 2021-05-13 DIAGNOSIS — Z87.891 PERSONAL HISTORY OF NICOTINE DEPENDENCE: ICD-10-CM

## 2021-05-13 DIAGNOSIS — Z90.49 ACQUIRED ABSENCE OF OTHER SPECIFIED PARTS OF DIGESTIVE TRACT: ICD-10-CM

## 2021-05-13 DIAGNOSIS — I50.32 CHRONIC DIASTOLIC (CONGESTIVE) HEART FAILURE: ICD-10-CM

## 2021-05-13 DIAGNOSIS — D64.9 ANEMIA, UNSPECIFIED: ICD-10-CM

## 2021-05-13 DIAGNOSIS — J44.9 CHRONIC OBSTRUCTIVE PULMONARY DISEASE, UNSPECIFIED: ICD-10-CM

## 2021-05-13 DIAGNOSIS — I45.81 LONG QT SYNDROME: ICD-10-CM

## 2021-05-13 DIAGNOSIS — Z95.810 PRESENCE OF AUTOMATIC (IMPLANTABLE) CARDIAC DEFIBRILLATOR: ICD-10-CM

## 2021-05-13 DIAGNOSIS — Z79.01 LONG TERM (CURRENT) USE OF ANTICOAGULANTS: ICD-10-CM

## 2021-05-13 DIAGNOSIS — I47.2 VENTRICULAR TACHYCARDIA: ICD-10-CM

## 2021-05-13 DIAGNOSIS — Z90.710 ACQUIRED ABSENCE OF BOTH CERVIX AND UTERUS: ICD-10-CM

## 2021-05-13 DIAGNOSIS — Z86.711 PERSONAL HISTORY OF PULMONARY EMBOLISM: ICD-10-CM

## 2021-05-13 DIAGNOSIS — D68.62 LUPUS ANTICOAGULANT SYNDROME: ICD-10-CM

## 2021-05-13 DIAGNOSIS — E83.42 HYPOMAGNESEMIA: ICD-10-CM

## 2021-05-13 DIAGNOSIS — D69.6 THROMBOCYTOPENIA, UNSPECIFIED: ICD-10-CM

## 2021-05-13 DIAGNOSIS — Z86.718 PERSONAL HISTORY OF OTHER VENOUS THROMBOSIS AND EMBOLISM: ICD-10-CM

## 2021-05-13 PROCEDURE — 93971 EXTREMITY STUDY: CPT

## 2021-05-13 PROCEDURE — 99213 OFFICE O/P EST LOW 20 MIN: CPT

## 2021-05-13 NOTE — HISTORY OF PRESENT ILLNESS
[FreeTextEntry1] : 51 y/o female with h/o laryngeal cancer, s/p chemotherapy and radiation treatment, h/o pacemaker placement initially in 2003, underwent leads replaced 10 years ago, h/o LLE DVT and PE, h/o Lupus anticoagulant on lifelong Xarelto, had left arm pain and swelling, intermittently since December 2020. \par \par She underwent a left subclavian vein venoplasty on 3/2/2021, but swelling is not improved.\par \par - Operative Findings 12 x 60 mm Venovo stent inserted in left SC\par vein-Innominate junction- post dilated to 12 mm.\par EP service accessed the axillary artery distal to the stent and passed the new\par pacemaker wires inside the venovo stent.\par the patient presents today complaining of severe pain and swelling to her left upper extremity

## 2021-05-13 NOTE — ASSESSMENT
[FreeTextEntry1] : 49 y/o female with h/o Lupus anticoagulant, LLE DVT and PE on Xarelto, h/o Pacemaker/ Defibrillator placement, now with left arm pain and swelling.\par \par She underwent left subclavian vein venoplasty on 3/2/2021, but arm swelling is not improved.\par On April 27, 2021 the patient underwent a redo left subclavian vein penoplasty\par - Operative Findings 12 x 60 mm Venovo stent inserted in left SC\par vein-Innominate junction- post dilated to 12 mm.\par EP service accessed the axillary artery distal to the stent and passed the new\par pacemaker wires inside the venovo stent.

## 2021-05-24 ENCOUNTER — APPOINTMENT (OUTPATIENT)
Dept: VASCULAR SURGERY | Facility: CLINIC | Age: 51
End: 2021-05-24

## 2021-05-27 RX ORDER — ALBUTEROL SULFATE 2.5 MG/3ML
(2.5 MG/3ML) SOLUTION RESPIRATORY (INHALATION)
Refills: 0 | Status: ACTIVE | COMMUNITY

## 2021-05-27 RX ORDER — METOPROLOL SUCCINATE 50 MG/1
50 TABLET, EXTENDED RELEASE ORAL
Refills: 0 | Status: ACTIVE | COMMUNITY

## 2021-05-27 RX ORDER — ALBUTEROL SULFATE 90 UG/1
108 (90 BASE) AEROSOL, METERED RESPIRATORY (INHALATION)
Refills: 0 | Status: ACTIVE | COMMUNITY

## 2021-06-01 ENCOUNTER — APPOINTMENT (OUTPATIENT)
Dept: CARDIOLOGY | Facility: CLINIC | Age: 51
End: 2021-06-01

## 2021-06-02 ENCOUNTER — APPOINTMENT (OUTPATIENT)
Age: 51
End: 2021-06-02
Payer: MEDICARE

## 2021-06-02 VITALS
BODY MASS INDEX: 29.77 KG/M2 | OXYGEN SATURATION: 98 % | SYSTOLIC BLOOD PRESSURE: 126 MMHG | HEART RATE: 116 BPM | RESPIRATION RATE: 14 BRPM | WEIGHT: 168 LBS | HEIGHT: 63 IN | DIASTOLIC BLOOD PRESSURE: 76 MMHG

## 2021-06-02 PROCEDURE — 99213 OFFICE O/P EST LOW 20 MIN: CPT

## 2021-06-02 RX ORDER — FLUTICASONE FUROATE, UMECLIDINIUM BROMIDE AND VILANTEROL TRIFENATATE 100; 62.5; 25 UG/1; UG/1; UG/1
100-62.5-25 POWDER RESPIRATORY (INHALATION) DAILY
Qty: 1 | Refills: 3 | Status: ACTIVE | COMMUNITY
Start: 2021-06-02 | End: 1900-01-01

## 2021-06-02 RX ORDER — ALBUTEROL SULFATE 90 UG/1
108 (90 BASE) INHALANT RESPIRATORY (INHALATION) EVERY 4 HOURS
Qty: 1 | Refills: 5 | Status: ACTIVE | COMMUNITY
Start: 2021-06-02 | End: 1900-01-01

## 2021-06-02 NOTE — DISCUSSION/SUMMARY
[FreeTextEntry1] : 1- ASTHMATIC BRONCHITIS RENEW MEDS\par 2- LLL PNA REPEAT CXR\par 3- FOLLOW HEMOC/ SLE FOR AC\par 4- RECORD FROM HOSP REVIEWED

## 2021-06-02 NOTE — HISTORY OF PRESENT ILLNESS
[TextBox_4] : SP RECENT ADMISSION FOR LUE DVT/ SP STENT ON  XARELRO, ASPIRATION, SOB ON EXERTION, CHART REVIEWED

## 2021-06-11 ENCOUNTER — APPOINTMENT (OUTPATIENT)
Dept: HEMATOLOGY ONCOLOGY | Facility: CLINIC | Age: 51
End: 2021-06-11
Payer: MEDICARE

## 2021-06-11 ENCOUNTER — OUTPATIENT (OUTPATIENT)
Dept: OUTPATIENT SERVICES | Facility: HOSPITAL | Age: 51
LOS: 1 days | Discharge: HOME | End: 2021-06-11

## 2021-06-11 VITALS
RESPIRATION RATE: 14 BRPM | WEIGHT: 168 LBS | SYSTOLIC BLOOD PRESSURE: 116 MMHG | BODY MASS INDEX: 29.77 KG/M2 | TEMPERATURE: 97.2 F | DIASTOLIC BLOOD PRESSURE: 75 MMHG | HEIGHT: 63 IN | HEART RATE: 102 BPM

## 2021-06-11 DIAGNOSIS — Z90.710 ACQUIRED ABSENCE OF BOTH CERVIX AND UTERUS: Chronic | ICD-10-CM

## 2021-06-11 DIAGNOSIS — Z95.810 PRESENCE OF AUTOMATIC (IMPLANTABLE) CARDIAC DEFIBRILLATOR: Chronic | ICD-10-CM

## 2021-06-11 DIAGNOSIS — Z98.890 OTHER SPECIFIED POSTPROCEDURAL STATES: Chronic | ICD-10-CM

## 2021-06-11 DIAGNOSIS — Z90.49 ACQUIRED ABSENCE OF OTHER SPECIFIED PARTS OF DIGESTIVE TRACT: Chronic | ICD-10-CM

## 2021-06-11 PROCEDURE — 99213 OFFICE O/P EST LOW 20 MIN: CPT

## 2021-06-11 NOTE — PHYSICAL EXAM
[Ambulatory and capable of all self care but unable to carry out any work activities] : Status 2- Ambulatory and capable of all self care but unable to carry out any work activities. Up and about more than 50% of waking hours [Normal] : no peripheral adenopathy appreciated [de-identified] : But still somewhat overweight [de-identified] : I can't hear any wheezes, crackles or rhonchi today. [de-identified] : Anxious [de-identified] : Slow movements and ambulation

## 2021-06-11 NOTE — REVIEW OF SYSTEMS
[Fever] : fever [Fatigue] : fatigue [Recent Change In Weight] : ~T recent weight change [SOB on Exertion] : shortness of breath during exertion [Abdominal Pain] : abdominal pain [Joint Pain] : joint pain [Joint Stiffness] : joint stiffness [Difficulty Walking] : difficulty walking [Anxiety] : anxiety [Easy Bruising] : a tendency for easy bruising [Negative] : Psychiatric

## 2021-06-11 NOTE — ASSESSMENT
[FreeTextEntry1] : Hypercoagulable state secondary to antiphospholipids/lupus anticoagulant.\par Given the fact that the swelling went down by the vascular surgical intervention, I would not change the Xarelto which she is tolerating well and has been on it for many years.\par Ideally, she should have been on Coumadin but that didn't work for various reasons.\par \par The situation was discussed at length with the patient and her .\par All questions answered.\par \par The patient will continue on Xarelto. \par \par She will see us at least once a year and as needed. The last time she was in our office was 4 years ago. In the meantime, she had multiple ENT interventions for hoarseness for a true vocal cord lesion.

## 2021-06-11 NOTE — HISTORY OF PRESENT ILLNESS
[de-identified] : The patient is coming for a follow up upon the recommendation of her pulmonary specialist for reevaluation of her thrombophilia and its management.\par The patient was in her usual state of health when she developed LUE swelling with pain. She was evaluated by the vascular surgeons and she underwent left subclavian vein venoplasty but the swelling did not improve. Eventually she underwent another intervention including a Venovo stent insertion in the left SC vein innominate junction post dilation. Her new pacemaker wires were then inserted. Eventually the swelling of the arm/forearm went down. There was a question of LUE DVT.\par In any event, at the present time, just on Xarelto without any change, the swelling is down after the vascular procedure. The patient was on Coumadin years ago for her antiphospholipid syndrome but could not tolerate and had become extremely difficult to maintain a therapeutic lever of the INR and that was switched to Xarelto.\par At present, the patient is complaining of generalized aches. Her joints have become very painful and stiff, very difficult to move.\par The appetite has also decreased. Apparently she has lost almost 15 lbs over the last 6 months or so. She states that she is up to date with all her screenings.\par She is having fevers on and off, sometimes as high as 105, attributed to her lupus.\par She has no problems with urine or bowel movements.\par

## 2021-06-16 DIAGNOSIS — D68.59 OTHER PRIMARY THROMBOPHILIA: ICD-10-CM

## 2021-06-24 ENCOUNTER — FORM ENCOUNTER (OUTPATIENT)
Age: 51
End: 2021-06-24

## 2021-07-02 ENCOUNTER — APPOINTMENT (OUTPATIENT)
Dept: OTOLARYNGOLOGY | Facility: CLINIC | Age: 51
End: 2021-07-02
Payer: MEDICARE

## 2021-07-02 DIAGNOSIS — B37.89 OTHER SITES OF CANDIDIASIS: ICD-10-CM

## 2021-07-02 PROCEDURE — 99214 OFFICE O/P EST MOD 30 MIN: CPT | Mod: 25

## 2021-07-02 PROCEDURE — 31575 DIAGNOSTIC LARYNGOSCOPY: CPT

## 2021-07-02 NOTE — HISTORY OF PRESENT ILLNESS
[de-identified] : 12/17/19 Patient is following up for left epistaxis, and voice loss. patient has been getting nose bleeds primarily in the left nostril for the last 2 weeks. last episode was this morning. She is on Xarelto. \par also her voice loss, and hoarseness has worsened. S/P multiple surgeries. \par her breathing \par Her lupus is flaring up, she is not on medication at this time due to kidney insufficiency. \par Also waiting for a new CT chest regarding her chest nodule. \par \par 7/29/2020: Patient presents today following up on hoarseness. Was given Nystatin at last visit in December and had improvement after that. She began having severe hoarse voice with intermittent voice loss 3 days ago.  Also cannot swallow solids still, no choking on liquids though. .  [FreeTextEntry1] : 07/02/2021: Patient  returns today following up on hoarseness  .  Has candida in the back of throat, appeared a week ago . She is experiencing  pain and burning sensation , throat feels narrow.

## 2021-07-02 NOTE — PHYSICAL EXAM
[Midline] : trachea located in midline position [Normal] : no rashes [de-identified] : septal perforation, dry [de-identified] : yeast noted on soft palate

## 2021-07-06 LAB — BACTERIA SPEC CULT: ABNORMAL

## 2021-07-08 RX ORDER — SULFAMETHOXAZOLE AND TRIMETHOPRIM 400; 80 MG/1; MG/1
400-80 TABLET ORAL TWICE DAILY
Qty: 20 | Refills: 0 | Status: ACTIVE | COMMUNITY
Start: 2021-07-08 | End: 1900-01-01

## 2021-07-20 ENCOUNTER — APPOINTMENT (OUTPATIENT)
Dept: CARDIOLOGY | Facility: CLINIC | Age: 51
End: 2021-07-20
Payer: MEDICARE

## 2021-07-20 ENCOUNTER — NON-APPOINTMENT (OUTPATIENT)
Age: 51
End: 2021-07-20

## 2021-07-20 PROCEDURE — 93296 REM INTERROG EVL PM/IDS: CPT

## 2021-07-20 PROCEDURE — 93295 DEV INTERROG REMOTE 1/2/MLT: CPT

## 2021-08-12 ENCOUNTER — OUTPATIENT (OUTPATIENT)
Dept: OUTPATIENT SERVICES | Facility: HOSPITAL | Age: 51
LOS: 1 days | Discharge: HOME | End: 2021-08-12
Payer: MEDICAID

## 2021-08-12 DIAGNOSIS — Z95.810 PRESENCE OF AUTOMATIC (IMPLANTABLE) CARDIAC DEFIBRILLATOR: Chronic | ICD-10-CM

## 2021-08-12 DIAGNOSIS — Z90.710 ACQUIRED ABSENCE OF BOTH CERVIX AND UTERUS: Chronic | ICD-10-CM

## 2021-08-12 DIAGNOSIS — Z98.890 OTHER SPECIFIED POSTPROCEDURAL STATES: Chronic | ICD-10-CM

## 2021-08-12 DIAGNOSIS — M25.522 PAIN IN LEFT ELBOW: ICD-10-CM

## 2021-08-12 DIAGNOSIS — Z90.49 ACQUIRED ABSENCE OF OTHER SPECIFIED PARTS OF DIGESTIVE TRACT: Chronic | ICD-10-CM

## 2021-08-12 DIAGNOSIS — R06.02 SHORTNESS OF BREATH: ICD-10-CM

## 2021-08-12 PROCEDURE — 73080 X-RAY EXAM OF ELBOW: CPT | Mod: 26,LT

## 2021-08-12 PROCEDURE — 71046 X-RAY EXAM CHEST 2 VIEWS: CPT | Mod: 26

## 2021-08-31 NOTE — PRE-ANESTHESIA EVALUATION ADULT - NSANTHOBSERVEDRD_ENT_A_CORE
Rest and stay well hydrated.     You can use 650mg Tylenol every 6 hours for pain - as needed.  This is an over-the-counter medications - please respect the warnings on the label. This medication come with certain risks and side effects that you need to discuss with your doctor, especially if you are taking it for a prolonged period.    You can use 400-600mg Ibuprofen (such as motrin or advil) every 6 to 8 hours as needed for pain control.  Take ibuprofen with food or milk to lessen stomach upset.  This is an over-the-counter medication please respect the warnings on the label. All medications come with certain risks and side effects that you need to discuss with your doctor, especially if you are taking them for a prolonged period.    Follow-up with your PMD for telehealth appointment or regular appointment in 1 week for further evaluation.     Use pulse-oximeter as instructed if you feel short of breath.     Return to the ED for any worsening difficulty breathing, chest pain, dehydration or any new concerning symptoms.
No

## 2021-09-01 ENCOUNTER — APPOINTMENT (OUTPATIENT)
Dept: OTOLARYNGOLOGY | Facility: CLINIC | Age: 51
End: 2021-09-01
Payer: MEDICARE

## 2021-09-01 PROCEDURE — 31575 DIAGNOSTIC LARYNGOSCOPY: CPT

## 2021-09-01 PROCEDURE — 99214 OFFICE O/P EST MOD 30 MIN: CPT | Mod: 25

## 2021-09-01 RX ORDER — NYSTATIN 100000 [USP'U]/ML
100000 SUSPENSION ORAL
Qty: 1 | Refills: 0 | Status: ACTIVE | COMMUNITY
Start: 2019-12-17 | End: 1900-01-01

## 2021-09-01 RX ORDER — FLUCONAZOLE 150 MG/1
150 TABLET ORAL
Qty: 2 | Refills: 0 | Status: ACTIVE | COMMUNITY
Start: 2021-07-02 | End: 1900-01-01

## 2021-09-01 RX ORDER — LANSOPRAZOLE 30 MG/1
30 CAPSULE, DELAYED RELEASE ORAL TWICE DAILY
Qty: 60 | Refills: 0 | Status: ACTIVE | COMMUNITY
Start: 2017-10-23 | End: 1900-01-01

## 2021-09-01 NOTE — HISTORY OF PRESENT ILLNESS
[de-identified] : 12/17/19 Patient is following up for left epistaxis, and voice loss. patient has been getting nose bleeds primarily in the left nostril for the last 2 weeks. last episode was this morning. She is on Xarelto. \par also her voice loss, and hoarseness has worsened. S/P multiple surgeries. \par her breathing \par Her lupus is flaring up, she is not on medication at this time due to kidney insufficiency. \par Also waiting for a new CT chest regarding her chest nodule. \par \par 7/29/2020: Patient presents today following up on hoarseness. Was given Nystatin at last visit in December and had improvement after that. She began having severe hoarse voice with intermittent voice loss 3 days ago.  Also cannot swallow solids still, no choking on liquids though. . \par \par 07/02/2021: Patient  returns today following up on hoarseness  .  Has candida in the back of throat, appeared a week ago . She is experiencing  pain and burning sensation , throat feels narrow. [FreeTextEntry1] : 09/01/21 : Patient returns today following up on hoarseness , yeast pharyngitis.  She has finished medication last prescribed .  She still hasn't gotten her voice back .  here to see if she still has candida .

## 2021-09-29 NOTE — CONSULT NOTE ADULT - CONSULT REASON
Left message on unidentified voicemail for patient to return call. Does he have enough inhaler until tomorrow's appt?  Lynette STACK RN     Fevers

## 2021-10-06 PROBLEM — I10 ESSENTIAL HYPERTENSION: Status: ACTIVE | Noted: 2017-06-26

## 2021-10-19 ENCOUNTER — NON-APPOINTMENT (OUTPATIENT)
Age: 51
End: 2021-10-19

## 2021-10-19 ENCOUNTER — APPOINTMENT (OUTPATIENT)
Dept: CARDIOLOGY | Facility: CLINIC | Age: 51
End: 2021-10-19
Payer: MEDICARE

## 2021-10-19 PROCEDURE — 93295 DEV INTERROG REMOTE 1/2/MLT: CPT | Mod: NC

## 2021-10-19 PROCEDURE — 93296 REM INTERROG EVL PM/IDS: CPT | Mod: NC

## 2021-11-23 RX ORDER — IPRATROPIUM BROMIDE AND ALBUTEROL SULFATE 2.5; .5 MG/3ML; MG/3ML
0.5-2.5 (3) SOLUTION RESPIRATORY (INHALATION) EVERY 8 HOURS
Qty: 1 | Refills: 3 | Status: ACTIVE | COMMUNITY
Start: 1900-01-01 | End: 1900-01-01

## 2022-01-07 ENCOUNTER — APPOINTMENT (OUTPATIENT)
Age: 52
End: 2022-01-07
Payer: MEDICARE

## 2022-01-07 ENCOUNTER — RX RENEWAL (OUTPATIENT)
Age: 52
End: 2022-01-07

## 2022-01-07 PROCEDURE — 99441: CPT | Mod: 95

## 2022-01-07 RX ORDER — FLUTICASONE FUROATE, UMECLIDINIUM BROMIDE AND VILANTEROL TRIFENATATE 100; 62.5; 25 UG/1; UG/1; UG/1
100-62.5-25 POWDER RESPIRATORY (INHALATION) DAILY
Qty: 1 | Refills: 5 | Status: ACTIVE | COMMUNITY
Start: 2022-01-07 | End: 1900-01-01

## 2022-01-07 RX ORDER — ALBUTEROL SULFATE 90 UG/1
108 (90 BASE) INHALANT RESPIRATORY (INHALATION) EVERY 4 HOURS
Qty: 17 | Refills: 0 | Status: ACTIVE | COMMUNITY
Start: 2022-01-07 | End: 1900-01-01

## 2022-01-07 NOTE — HISTORY OF PRESENT ILLNESS
[TextBox_4] : SOB ON MINIMAL EXERTION\par COUGH STOPPED SMOKING\par POX 87% RA AT REST COMPLIANT WITH TRELEGY

## 2022-01-07 NOTE — REASON FOR VISIT
[Home] : at home, [unfilled] , at the time of the visit. [Medical Office: ([facility])___] : at the medical office located in  [Spouse] : spouse [Verbal consent obtained from patient] : the patient, [unfilled] [Cough] : cough [Shortness of Breath] : shortness of breath

## 2022-01-19 ENCOUNTER — NON-APPOINTMENT (OUTPATIENT)
Age: 52
End: 2022-01-19

## 2022-01-19 ENCOUNTER — APPOINTMENT (OUTPATIENT)
Dept: CARDIOLOGY | Facility: CLINIC | Age: 52
End: 2022-01-19
Payer: MEDICARE

## 2022-01-19 PROCEDURE — 93295 DEV INTERROG REMOTE 1/2/MLT: CPT

## 2022-01-19 PROCEDURE — 93296 REM INTERROG EVL PM/IDS: CPT

## 2022-03-01 ENCOUNTER — APPOINTMENT (OUTPATIENT)
Dept: VASCULAR SURGERY | Facility: CLINIC | Age: 52
End: 2022-03-01
Payer: MEDICARE

## 2022-03-01 VITALS
DIASTOLIC BLOOD PRESSURE: 80 MMHG | BODY MASS INDEX: 29.23 KG/M2 | SYSTOLIC BLOOD PRESSURE: 118 MMHG | WEIGHT: 165 LBS | HEIGHT: 63 IN

## 2022-03-01 PROCEDURE — 93971 EXTREMITY STUDY: CPT

## 2022-03-01 PROCEDURE — 99214 OFFICE O/P EST MOD 30 MIN: CPT

## 2022-03-01 NOTE — HISTORY OF PRESENT ILLNESS
[FreeTextEntry1] : 52 y/o female with h/o laryngeal cancer, s/p chemotherapy and radiation treatment, h/o pacemaker placement initially in 2003, underwent leads replaced 10 years ago, h/o LLE DVT and PE, h/o Lupus anticoagulant on lifelong Xarelto, had left arm pain and swelling, intermittently since December 2020. She underwent a left subclavian vein venoplasty on 3/2/2021. She had persistent left UE pain and swelling and on 4/27/21, she underwent extraction of old pacemaker wires by EP and Cardiothoracic surgery and at the same time had left subclavian vein stenting by my partner, Dr. Bryson. Two new pacemaker wires were placed by EP after the vein stenting. Her arm swelling had improved after the stenting and now developed new left arm swelling since last week. Also c/o intermittent pain and swelling to left leg since December 2021\par \par \par

## 2022-03-01 NOTE — DATA REVIEWED
[FreeTextEntry1] : I performed a venous duplex which was medically necessary to evaluate for DVT in the left upper and lower extremity. It showed no evidence of DVT in the left lower extremity. There were chronic changes noted in the left subclavian vein and chronic occlusive thrombus in the ulnar vein at the wrist.\par

## 2022-03-01 NOTE — ASSESSMENT
[FreeTextEntry1] : 52 y/o female with h/o laryngeal cancer, s/p chemotherapy and radiation treatment, h/o pacemaker placement initially in 2003, underwent lead replacement 10 years ago, h/o LLE DVT and PE, h/o Lupus anticoagulant on lifelong Xarelto. She underwent a left subclavian vein stent on 4/27/2021.\par \par Venous duplex showed no evidence of DVT in the left lower extremity. There were chronic changes noted in the left subclavian vein and chronic occlusive thrombus in the ulnar vein at the wrist.\par \par I offered her a left upper extremity venogram on 3/18/22 and she is in agreement to proceed. She was advised to hold Xarelto the day before the procedure.

## 2022-03-01 NOTE — CONSULT LETTER
[Dear  ___] : Dear  [unfilled], [Courtesy Letter:] : I had the pleasure of seeing your patient, [unfilled], in my office today. [Please see my note below.] : Please see my note below. [FreeTextEntry2] : Dear Dr. Jason Eaton,

## 2022-03-07 ENCOUNTER — OUTPATIENT (OUTPATIENT)
Dept: OUTPATIENT SERVICES | Facility: HOSPITAL | Age: 52
LOS: 1 days | Discharge: HOME | End: 2022-03-07
Payer: MEDICARE

## 2022-03-07 VITALS
TEMPERATURE: 97 F | WEIGHT: 172.62 LBS | HEIGHT: 63 IN | HEART RATE: 61 BPM | RESPIRATION RATE: 16 BRPM | OXYGEN SATURATION: 98 % | SYSTOLIC BLOOD PRESSURE: 176 MMHG | DIASTOLIC BLOOD PRESSURE: 80 MMHG

## 2022-03-07 DIAGNOSIS — Z98.890 OTHER SPECIFIED POSTPROCEDURAL STATES: Chronic | ICD-10-CM

## 2022-03-07 DIAGNOSIS — Z95.810 PRESENCE OF AUTOMATIC (IMPLANTABLE) CARDIAC DEFIBRILLATOR: Chronic | ICD-10-CM

## 2022-03-07 DIAGNOSIS — Z01.818 ENCOUNTER FOR OTHER PREPROCEDURAL EXAMINATION: ICD-10-CM

## 2022-03-07 DIAGNOSIS — Z90.49 ACQUIRED ABSENCE OF OTHER SPECIFIED PARTS OF DIGESTIVE TRACT: Chronic | ICD-10-CM

## 2022-03-07 DIAGNOSIS — Z90.710 ACQUIRED ABSENCE OF BOTH CERVIX AND UTERUS: Chronic | ICD-10-CM

## 2022-03-07 DIAGNOSIS — M79.89 OTHER SPECIFIED SOFT TISSUE DISORDERS: ICD-10-CM

## 2022-03-07 DIAGNOSIS — I77.1 STRICTURE OF ARTERY: Chronic | ICD-10-CM

## 2022-03-07 LAB
ALBUMIN SERPL ELPH-MCNC: 4.7 G/DL — SIGNIFICANT CHANGE UP (ref 3.5–5.2)
ALP SERPL-CCNC: 73 U/L — SIGNIFICANT CHANGE UP (ref 30–115)
ALT FLD-CCNC: 8 U/L — SIGNIFICANT CHANGE UP (ref 0–41)
ANION GAP SERPL CALC-SCNC: 16 MMOL/L — HIGH (ref 7–14)
APTT BLD: 65.8 SEC — HIGH (ref 27–39.2)
AST SERPL-CCNC: 16 U/L — SIGNIFICANT CHANGE UP (ref 0–41)
BASOPHILS # BLD AUTO: 0.05 K/UL — SIGNIFICANT CHANGE UP (ref 0–0.2)
BASOPHILS NFR BLD AUTO: 0.6 % — SIGNIFICANT CHANGE UP (ref 0–1)
BILIRUB SERPL-MCNC: 0.7 MG/DL — SIGNIFICANT CHANGE UP (ref 0.2–1.2)
BUN SERPL-MCNC: 11 MG/DL — SIGNIFICANT CHANGE UP (ref 10–20)
CALCIUM SERPL-MCNC: 9.5 MG/DL — SIGNIFICANT CHANGE UP (ref 8.5–10.1)
CHLORIDE SERPL-SCNC: 98 MMOL/L — SIGNIFICANT CHANGE UP (ref 98–110)
CO2 SERPL-SCNC: 25 MMOL/L — SIGNIFICANT CHANGE UP (ref 17–32)
CREAT SERPL-MCNC: 0.9 MG/DL — SIGNIFICANT CHANGE UP (ref 0.7–1.5)
EGFR: 77 ML/MIN/1.73M2 — SIGNIFICANT CHANGE UP
EOSINOPHIL # BLD AUTO: 0.25 K/UL — SIGNIFICANT CHANGE UP (ref 0–0.7)
EOSINOPHIL NFR BLD AUTO: 3.2 % — SIGNIFICANT CHANGE UP (ref 0–8)
GLUCOSE SERPL-MCNC: 75 MG/DL — SIGNIFICANT CHANGE UP (ref 70–99)
HCT VFR BLD CALC: 46.2 % — SIGNIFICANT CHANGE UP (ref 37–47)
HGB BLD-MCNC: 14.5 G/DL — SIGNIFICANT CHANGE UP (ref 12–16)
IMM GRANULOCYTES NFR BLD AUTO: 0.3 % — SIGNIFICANT CHANGE UP (ref 0.1–0.3)
INR BLD: 1.45 RATIO — HIGH (ref 0.65–1.3)
LYMPHOCYTES # BLD AUTO: 2.2 K/UL — SIGNIFICANT CHANGE UP (ref 1.2–3.4)
LYMPHOCYTES # BLD AUTO: 28 % — SIGNIFICANT CHANGE UP (ref 20.5–51.1)
MCHC RBC-ENTMCNC: 28.8 PG — SIGNIFICANT CHANGE UP (ref 27–31)
MCHC RBC-ENTMCNC: 31.4 G/DL — LOW (ref 32–37)
MCV RBC AUTO: 91.8 FL — SIGNIFICANT CHANGE UP (ref 81–99)
MONOCYTES # BLD AUTO: 0.81 K/UL — HIGH (ref 0.1–0.6)
MONOCYTES NFR BLD AUTO: 10.3 % — HIGH (ref 1.7–9.3)
NEUTROPHILS # BLD AUTO: 4.53 K/UL — SIGNIFICANT CHANGE UP (ref 1.4–6.5)
NEUTROPHILS NFR BLD AUTO: 57.6 % — SIGNIFICANT CHANGE UP (ref 42.2–75.2)
NRBC # BLD: 0 /100 WBCS — SIGNIFICANT CHANGE UP (ref 0–0)
NT-PROBNP SERPL-SCNC: 555 PG/ML — HIGH (ref 0–300)
PLATELET # BLD AUTO: 163 K/UL — SIGNIFICANT CHANGE UP (ref 130–400)
POTASSIUM SERPL-MCNC: 4.8 MMOL/L — SIGNIFICANT CHANGE UP (ref 3.5–5)
POTASSIUM SERPL-SCNC: 4.8 MMOL/L — SIGNIFICANT CHANGE UP (ref 3.5–5)
PROT SERPL-MCNC: 7.2 G/DL — SIGNIFICANT CHANGE UP (ref 6–8)
PROTHROM AB SERPL-ACNC: 16.6 SEC — HIGH (ref 9.95–12.87)
RBC # BLD: 5.03 M/UL — SIGNIFICANT CHANGE UP (ref 4.2–5.4)
RBC # FLD: 14.4 % — SIGNIFICANT CHANGE UP (ref 11.5–14.5)
SODIUM SERPL-SCNC: 139 MMOL/L — SIGNIFICANT CHANGE UP (ref 135–146)
WBC # BLD: 7.86 K/UL — SIGNIFICANT CHANGE UP (ref 4.8–10.8)
WBC # FLD AUTO: 7.86 K/UL — SIGNIFICANT CHANGE UP (ref 4.8–10.8)

## 2022-03-07 PROCEDURE — 93010 ELECTROCARDIOGRAM REPORT: CPT

## 2022-03-07 RX ORDER — BACLOFEN 100 %
1 POWDER (GRAM) MISCELLANEOUS
Qty: 0 | Refills: 0 | DISCHARGE

## 2022-03-07 NOTE — H&P PST ADULT - NSICDXPASTMEDICALHX_GEN_ALL_CORE_FT
PAST MEDICAL HISTORY:  Cervical cancer     CHF (congestive heart failure)     COPD (chronic obstructive pulmonary disease)     DVT (deep venous thrombosis)     GERD (gastroesophageal reflux disease)     History of laryngeal cancer     HTN (hypertension)     Lupus     Pulmonary embolism     RA (rheumatoid arthritis)

## 2022-03-07 NOTE — H&P PST ADULT - REASON FOR ADMISSION
50 y/o female presents to PAST in preparation for left upper extremity venogram, possible endovascular revascularization with Dr. Guan in [facility] under LSB on 3/18/22

## 2022-03-07 NOTE — H&P PST ADULT - HEALTH CARE MAINTENANCE
HISTORY OF PRESENT ILLNESS    Elvira Cast 1983 is a 40y.[facility] [address]     Seizure ([facility] [address]      colonoscopy-2019

## 2022-03-07 NOTE — H&P PST ADULT - NSICDXPASTSURGICALHX_GEN_ALL_CORE_FT
PAST SURGICAL HISTORY:  AICD (automatic cardioverter/defibrillator) present     H/O foot surgery     History of back surgery     S/P appendectomy     S/P cholecystectomy     S/P eye surgery     S/P hysterectomy     Subclavian arterial stenosis

## 2022-03-07 NOTE — H&P PST ADULT - VENOUS THROMBOEMBOLISM CURRENT STATUS
(1) abnormal pulmonary function (COPD)/(1) congestive heart failure (within 1 month)/(1) swollen legs (current)/(2) malignancy (present or previous)

## 2022-03-08 PROBLEM — C53.9 MALIGNANT NEOPLASM OF CERVIX UTERI, UNSPECIFIED: Chronic | Status: ACTIVE | Noted: 2022-03-07

## 2022-03-08 PROBLEM — K21.9 GASTRO-ESOPHAGEAL REFLUX DISEASE WITHOUT ESOPHAGITIS: Chronic | Status: ACTIVE | Noted: 2022-03-07

## 2022-03-15 ENCOUNTER — APPOINTMENT (OUTPATIENT)
Age: 52
End: 2022-03-15
Payer: MEDICARE

## 2022-03-15 ENCOUNTER — LABORATORY RESULT (OUTPATIENT)
Age: 52
End: 2022-03-15

## 2022-03-15 PROCEDURE — 99441: CPT | Mod: 95

## 2022-03-15 NOTE — REASON FOR VISIT
[Home] : at home, [unfilled] , at the time of the visit. [Medical Office: ([facility])___] : at the medical office located in  [Verbal consent obtained from patient] : the patient, [unfilled] [Follow-Up] : a follow-up visit [COPD] : COPD

## 2022-03-15 NOTE — DISCUSSION/SUMMARY
[FreeTextEntry1] : COPD STABLE ON TRELEGY\par PULMONARY STANDPOINT NO CONTRAINDICATION TO PLANNED PROCEDURE, NEED TO USE HER INHALERS AS PRESCRIBED

## 2022-03-17 NOTE — ASU PATIENT PROFILE, ADULT - FALL HARM RISK - HARM RISK INTERVENTIONS

## 2022-03-18 ENCOUNTER — TRANSCRIPTION ENCOUNTER (OUTPATIENT)
Age: 52
End: 2022-03-18

## 2022-03-18 ENCOUNTER — APPOINTMENT (OUTPATIENT)
Dept: VASCULAR SURGERY | Facility: HOSPITAL | Age: 52
End: 2022-03-18

## 2022-03-18 ENCOUNTER — OUTPATIENT (OUTPATIENT)
Dept: OUTPATIENT SERVICES | Facility: HOSPITAL | Age: 52
LOS: 1 days | Discharge: HOME | End: 2022-03-18
Payer: MEDICARE

## 2022-03-18 VITALS
HEIGHT: 63 IN | SYSTOLIC BLOOD PRESSURE: 153 MMHG | OXYGEN SATURATION: 96 % | WEIGHT: 164.91 LBS | HEART RATE: 68 BPM | RESPIRATION RATE: 18 BRPM | TEMPERATURE: 98 F | DIASTOLIC BLOOD PRESSURE: 69 MMHG

## 2022-03-18 VITALS — SYSTOLIC BLOOD PRESSURE: 161 MMHG | HEART RATE: 63 BPM | DIASTOLIC BLOOD PRESSURE: 75 MMHG

## 2022-03-18 DIAGNOSIS — Z98.890 OTHER SPECIFIED POSTPROCEDURAL STATES: Chronic | ICD-10-CM

## 2022-03-18 DIAGNOSIS — I77.1 STRICTURE OF ARTERY: Chronic | ICD-10-CM

## 2022-03-18 DIAGNOSIS — Z95.810 PRESENCE OF AUTOMATIC (IMPLANTABLE) CARDIAC DEFIBRILLATOR: Chronic | ICD-10-CM

## 2022-03-18 DIAGNOSIS — Z90.710 ACQUIRED ABSENCE OF BOTH CERVIX AND UTERUS: Chronic | ICD-10-CM

## 2022-03-18 DIAGNOSIS — Z90.49 ACQUIRED ABSENCE OF OTHER SPECIFIED PARTS OF DIGESTIVE TRACT: Chronic | ICD-10-CM

## 2022-03-18 PROCEDURE — 76937 US GUIDE VASCULAR ACCESS: CPT | Mod: 26

## 2022-03-18 PROCEDURE — 36005 INJECTION EXT VENOGRAPHY: CPT

## 2022-03-18 PROCEDURE — 75820 VEIN X-RAY ARM/LEG: CPT | Mod: 26

## 2022-03-18 RX ORDER — HYDROMORPHONE HYDROCHLORIDE 2 MG/ML
1 INJECTION INTRAMUSCULAR; INTRAVENOUS; SUBCUTANEOUS
Refills: 0 | Status: DISCONTINUED | OUTPATIENT
Start: 2022-03-18 | End: 2022-03-18

## 2022-03-18 RX ORDER — HYDROMORPHONE HYDROCHLORIDE 2 MG/ML
0.5 INJECTION INTRAMUSCULAR; INTRAVENOUS; SUBCUTANEOUS
Refills: 0 | Status: DISCONTINUED | OUTPATIENT
Start: 2022-03-18 | End: 2022-03-18

## 2022-03-18 RX ORDER — ONDANSETRON 8 MG/1
4 TABLET, FILM COATED ORAL ONCE
Refills: 0 | Status: DISCONTINUED | OUTPATIENT
Start: 2022-03-18 | End: 2022-03-18

## 2022-03-18 RX ORDER — SODIUM CHLORIDE 9 MG/ML
1000 INJECTION, SOLUTION INTRAVENOUS
Refills: 0 | Status: DISCONTINUED | OUTPATIENT
Start: 2022-03-18 | End: 2022-03-18

## 2022-03-18 NOTE — CHART NOTE - NSCHARTNOTEFT_GEN_A_CORE
PACU ANESTHESIA ADMISSION NOTE      Procedure: Left upper extremity venogram  Post op diagnosis:      ____  Intubated  TV:______       Rate: ______      FiO2: ______    __X__  Patent Airway    __X__  Full return of protective reflexes    _X___  Full recovery from anesthesia / back to baseline     Vitals:   T: 97.4 F          R:    14              BP:   140/70               Sat:    99%               P: 65 bpm      Mental Status:  ___X_ Awake   _X____ Alert   _____ Drowsy   _____ Sedated    Nausea/Vomiting:  _X___ NO  ______Yes,   See Post - Op Orders          Pain Scale (0-10):  __0___    Treatment: _X___ None    ____ See Post - Op/PCA Orders    Post - Operative Fluids:   ____ Oral   ___X_ See Post - Op Orders    Plan: Discharge:   __X__Home       _____Floor     _____Critical Care    _____  Other:_________________    Comments: Patient dropped off to PACU. VSS. Airway patent. Pt awake and responsive. Report to PACU RN at bedside. No anesthesia complications.

## 2022-03-18 NOTE — ASU DISCHARGE PLAN (ADULT/PEDIATRIC) - ASU DC SPECIAL INSTRUCTIONSFT
Diet: regular diet  Activity: ambulate as tolerated, no heavy lifting for 4-6weeks  Dressing: keep dressing dry and can remove in 48hrs. Keep Left upper extremity with ace wrap during the day, may remove at night for sleeping. Try to keep arm elevated.  Medication: can restart home medications. May take tylenol 650mg PO q6hrs for pain and Motrin 400mg PO q8hrs for pain, alternate.   Follow up: please call Dr Guan's office for an appointment in 10days. [phone]

## 2022-03-18 NOTE — ASU DISCHARGE PLAN (ADULT/PEDIATRIC) - NS MD DC FALL RISK RISK
For information on Fall & Injury Prevention, visit: [email or web address] OR  [email or web address] OR  https://www.cdc.gov/steadi/patient.html

## 2022-03-18 NOTE — ASU DISCHARGE PLAN (ADULT/PEDIATRIC) - CARE PROVIDER_API CALL
Waqas Guan)  Surgery; Vascular Surgery  [address] [place] [address]  Phone: [phone]  Fax: [phone]  Follow Up Time:

## 2022-03-23 ENCOUNTER — INPATIENT (INPATIENT)
Facility: HOSPITAL | Age: 52
LOS: 2 days | Discharge: HOME HEALTH | End: 2022-03-26
Attending: INTERNAL MEDICINE | Admitting: INTERNAL MEDICINE
Payer: MEDICARE

## 2022-03-23 VITALS
SYSTOLIC BLOOD PRESSURE: 141 MMHG | OXYGEN SATURATION: 97 % | WEIGHT: 160.06 LBS | HEIGHT: 63 IN | DIASTOLIC BLOOD PRESSURE: 88 MMHG | RESPIRATION RATE: 17 BRPM | HEART RATE: 117 BPM | TEMPERATURE: 99 F

## 2022-03-23 DIAGNOSIS — Z98.890 OTHER SPECIFIED POSTPROCEDURAL STATES: Chronic | ICD-10-CM

## 2022-03-23 DIAGNOSIS — Z90.49 ACQUIRED ABSENCE OF OTHER SPECIFIED PARTS OF DIGESTIVE TRACT: Chronic | ICD-10-CM

## 2022-03-23 DIAGNOSIS — I10 ESSENTIAL (PRIMARY) HYPERTENSION: ICD-10-CM

## 2022-03-23 DIAGNOSIS — I77.1 STRICTURE OF ARTERY: Chronic | ICD-10-CM

## 2022-03-23 DIAGNOSIS — M79.89 OTHER SPECIFIED SOFT TISSUE DISORDERS: ICD-10-CM

## 2022-03-23 DIAGNOSIS — J44.9 CHRONIC OBSTRUCTIVE PULMONARY DISEASE, UNSPECIFIED: ICD-10-CM

## 2022-03-23 DIAGNOSIS — Z90.710 ACQUIRED ABSENCE OF BOTH CERVIX AND UTERUS: Chronic | ICD-10-CM

## 2022-03-23 DIAGNOSIS — Z95.810 PRESENCE OF AUTOMATIC (IMPLANTABLE) CARDIAC DEFIBRILLATOR: Chronic | ICD-10-CM

## 2022-03-23 LAB
ALBUMIN SERPL ELPH-MCNC: 4.3 G/DL — SIGNIFICANT CHANGE UP (ref 3.5–5.2)
ALP SERPL-CCNC: 68 U/L — SIGNIFICANT CHANGE UP (ref 30–115)
ALT FLD-CCNC: 8 U/L — SIGNIFICANT CHANGE UP (ref 0–41)
ANION GAP SERPL CALC-SCNC: 11 MMOL/L — SIGNIFICANT CHANGE UP (ref 7–14)
AST SERPL-CCNC: 15 U/L — SIGNIFICANT CHANGE UP (ref 0–41)
BASOPHILS # BLD AUTO: 0.05 K/UL — SIGNIFICANT CHANGE UP (ref 0–0.2)
BASOPHILS NFR BLD AUTO: 0.6 % — SIGNIFICANT CHANGE UP (ref 0–1)
BILIRUB SERPL-MCNC: 0.5 MG/DL — SIGNIFICANT CHANGE UP (ref 0.2–1.2)
BUN SERPL-MCNC: 13 MG/DL — SIGNIFICANT CHANGE UP (ref 10–20)
CALCIUM SERPL-MCNC: 9.7 MG/DL — SIGNIFICANT CHANGE UP (ref 8.5–10.1)
CHLORIDE SERPL-SCNC: 99 MMOL/L — SIGNIFICANT CHANGE UP (ref 98–110)
CO2 SERPL-SCNC: 28 MMOL/L — SIGNIFICANT CHANGE UP (ref 17–32)
CREAT SERPL-MCNC: 1 MG/DL — SIGNIFICANT CHANGE UP (ref 0.7–1.5)
EGFR: 68 ML/MIN/1.73M2 — SIGNIFICANT CHANGE UP
EOSINOPHIL # BLD AUTO: 0.09 K/UL — SIGNIFICANT CHANGE UP (ref 0–0.7)
EOSINOPHIL NFR BLD AUTO: 1.1 % — SIGNIFICANT CHANGE UP (ref 0–8)
GLUCOSE SERPL-MCNC: 99 MG/DL — SIGNIFICANT CHANGE UP (ref 70–99)
HCG SERPL QL: NEGATIVE — SIGNIFICANT CHANGE UP
HCT VFR BLD CALC: 43.5 % — SIGNIFICANT CHANGE UP (ref 37–47)
HGB BLD-MCNC: 14.4 G/DL — SIGNIFICANT CHANGE UP (ref 12–16)
IMM GRANULOCYTES NFR BLD AUTO: 0.4 % — HIGH (ref 0.1–0.3)
LYMPHOCYTES # BLD AUTO: 2.12 K/UL — SIGNIFICANT CHANGE UP (ref 1.2–3.4)
LYMPHOCYTES # BLD AUTO: 26.1 % — SIGNIFICANT CHANGE UP (ref 20.5–51.1)
MCHC RBC-ENTMCNC: 29.3 PG — SIGNIFICANT CHANGE UP (ref 27–31)
MCHC RBC-ENTMCNC: 33.1 G/DL — SIGNIFICANT CHANGE UP (ref 32–37)
MCV RBC AUTO: 88.6 FL — SIGNIFICANT CHANGE UP (ref 81–99)
MONOCYTES # BLD AUTO: 0.8 K/UL — HIGH (ref 0.1–0.6)
MONOCYTES NFR BLD AUTO: 9.8 % — HIGH (ref 1.7–9.3)
NEUTROPHILS # BLD AUTO: 5.04 K/UL — SIGNIFICANT CHANGE UP (ref 1.4–6.5)
NEUTROPHILS NFR BLD AUTO: 62 % — SIGNIFICANT CHANGE UP (ref 42.2–75.2)
NRBC # BLD: 0 /100 WBCS — SIGNIFICANT CHANGE UP (ref 0–0)
PLATELET # BLD AUTO: 143 K/UL — SIGNIFICANT CHANGE UP (ref 130–400)
POTASSIUM SERPL-MCNC: 3.9 MMOL/L — SIGNIFICANT CHANGE UP (ref 3.5–5)
POTASSIUM SERPL-SCNC: 3.9 MMOL/L — SIGNIFICANT CHANGE UP (ref 3.5–5)
PROT SERPL-MCNC: 7 G/DL — SIGNIFICANT CHANGE UP (ref 6–8)
RBC # BLD: 4.91 M/UL — SIGNIFICANT CHANGE UP (ref 4.2–5.4)
RBC # FLD: 14 % — SIGNIFICANT CHANGE UP (ref 11.5–14.5)
SODIUM SERPL-SCNC: 138 MMOL/L — SIGNIFICANT CHANGE UP (ref 135–146)
TROPONIN T SERPL-MCNC: <0.01 NG/ML — SIGNIFICANT CHANGE UP
WBC # BLD: 8.13 K/UL — SIGNIFICANT CHANGE UP (ref 4.8–10.8)
WBC # FLD AUTO: 8.13 K/UL — SIGNIFICANT CHANGE UP (ref 4.8–10.8)

## 2022-03-23 PROCEDURE — 93931 UPPER EXTREMITY STUDY: CPT | Mod: 26

## 2022-03-23 PROCEDURE — 93010 ELECTROCARDIOGRAM REPORT: CPT

## 2022-03-23 PROCEDURE — 71260 CT THORAX DX C+: CPT | Mod: 26,MA

## 2022-03-23 PROCEDURE — 71045 X-RAY EXAM CHEST 1 VIEW: CPT | Mod: 26

## 2022-03-23 PROCEDURE — 93971 EXTREMITY STUDY: CPT | Mod: 26

## 2022-03-23 PROCEDURE — 99285 EMERGENCY DEPT VISIT HI MDM: CPT | Mod: FS

## 2022-03-23 RX ORDER — HEPARIN SODIUM 5000 [USP'U]/ML
3000 INJECTION INTRAVENOUS; SUBCUTANEOUS EVERY 6 HOURS
Refills: 0 | Status: DISCONTINUED | OUTPATIENT
Start: 2022-03-23 | End: 2022-03-24

## 2022-03-23 RX ORDER — HEPARIN SODIUM 5000 [USP'U]/ML
INJECTION INTRAVENOUS; SUBCUTANEOUS
Qty: 25000 | Refills: 0 | Status: DISCONTINUED | OUTPATIENT
Start: 2022-03-23 | End: 2022-03-24

## 2022-03-23 RX ORDER — HEPARIN SODIUM 5000 [USP'U]/ML
6000 INJECTION INTRAVENOUS; SUBCUTANEOUS ONCE
Refills: 0 | Status: COMPLETED | OUTPATIENT
Start: 2022-03-23 | End: 2022-03-23

## 2022-03-23 RX ORDER — HEPARIN SODIUM 5000 [USP'U]/ML
6000 INJECTION INTRAVENOUS; SUBCUTANEOUS EVERY 6 HOURS
Refills: 0 | Status: DISCONTINUED | OUTPATIENT
Start: 2022-03-23 | End: 2022-03-24

## 2022-03-23 RX ADMIN — HEPARIN SODIUM 6000 UNIT(S): 5000 INJECTION INTRAVENOUS; SUBCUTANEOUS at 21:55

## 2022-03-23 RX ADMIN — HEPARIN SODIUM 1300 UNIT(S)/HR: 5000 INJECTION INTRAVENOUS; SUBCUTANEOUS at 21:53

## 2022-03-23 NOTE — CONSULT NOTE ADULT - SUBJECTIVE AND OBJECTIVE BOX
GENERAL SURGERY CONSULT NOTE    Patient: ILEANA MCNEIL , 51y ([birth year])Female   MRN: [id number]  Location: [facility] ED  Visit: 03-23-22 Emergency  Date: 03-23-22 @ 22:18    HPI: 51 year old female with PMH of SLE, RA, HTN, CHF, COPD, DVT, PE, Laryngeal CA, GERD, cervical CA, presents to the ED with worsening left arm pain and swelling. Patient is well known to Dr. Guan. Patient had 3 venograms in 3/2021 and a pacemaker in place in 4/2021. On 3/18/2022, patient was in the hospital for outpatient left upper extremity venogram. During the case, the stent was occluded and was unable to pass the wire or catheter through. Patient was educated to come back to the ED if left arm swelling or pain worsens. Patient reports worsening symptoms of the left arm and now a sharp chest pain directly over the pacemaker. L radial and ulnar pulses dopperable. At bedside exam, patient is SOB, chest is tender directly over pacemaker, edema and erythema noted throughout LUE.       PAST MEDICAL & SURGICAL HISTORY:  Lupus    RA (rheumatoid arthritis)    HTN (hypertension)    CHF (congestive heart failure)    COPD (chronic obstructive pulmonary disease)    DVT (deep venous thrombosis)    Pulmonary embolism    History of laryngeal cancer    GERD (gastroesophageal reflux disease)    Cervical cancer    S/P appendectomy    S/P cholecystectomy    AICD (automatic cardioverter/defibrillator) present    S/P hysterectomy    History of back surgery    H/O foot surgery    S/P eye surgery    Subclavian arterial stenosis        Home Medications:  albuterol 90 mcg/inh inhalation aerosol: 2 puff(s) inhaled every 6 hours (18 Mar 2022 08:54)  ALPRAZolam 1 mg oral tablet: 1 tab(s) orally 3 times a day, As needed, Anxiety (18 Mar 2022 08:54)  Benlysta: 200 milligram(s) intravenous once a week (18 Mar 2022 08:54)  cyanocobalamin 1000 mcg/mL injectable solution: 1 milliliter(s) injectable once a week (18 Mar 2022 08:54)  folic acid 1 mg oral tablet: 1 tab(s) orally once a day (18 Mar 2022 08:54)  hydrALAZINE 50 mg oral tablet: 2 tab(s) orally once a day (18 Mar 2022 08:54)  Metoprolol Succinate  mg oral tablet, extended release: 1 tab(s) orally once a day (18 Mar 2022 08:54)  MS Contin 60 mg oral tablet, extended release: 1 tab(s) orally every 8 hours (18 Mar 2022 08:54)  Reglan 10 mg oral tablet: 1 tab(s) orally 4 times a day (before meals and at bedtime) (18 Mar 2022 08:54)  Trelegy Ellipta 100 mcg-62.5 mcg-25 mcg/inh inhalation powder: 1 puff(s) inhaled once a day (18 Mar 2022 08:54)  Xarelto 20 mg oral tablet: 1 tab(s) orally once a day (in the evening) (18 Mar 2022 08:54)        VITALS:  T(F): 98.6 (03-23-22 @ 18:03), Max: 98.6 (03-23-22 @ 18:03)  HR: 117 (03-23-22 @ 18:03) (117 - 117)  BP: 141/88 (03-23-22 @ 18:03) (141/88 - 141/88)  RR: 17 (03-23-22 @ 18:03) (17 - 17)  SpO2: 97% (03-23-22 @ 18:03) (97% - 97%)    PHYSICAL EXAM:  General: NAD, AAOx3,  HEENT: NCAT, JESSIE, EOMI,   Cardiac: RRR S1, S2,   Respiratory: CTAB, normal respiratory effort, +chest wall tenderness over pacemaker site   Abdomen: Soft, non-distended, non-tender  Musculoskeletal: +erythema and pitting edema on LUE, limited ROM d/t edema   Neuro: Sensation grossly intact, no focal deficits  Vascular: B/l Doppler radial and ulnar    MEDICATIONS  (STANDING):  heparin  Infusion.  Unit(s)/Hr (13 mL/Hr) IV Continuous <Continuous>    MEDICATIONS  (PRN):  heparin   Injectable 6000 Unit(s) IV Push every 6 hours PRN For aPTT less than 40  heparin   Injectable 3000 Unit(s) IV Push every 6 hours PRN For aPTT between 40 - 57      LAB/STUDIES:                        14.4   8.13  )-----------( 143      ( 23 Mar 2022 20:01 )             43.5     03-23    138  |  99  |  13  ----------------------------<  99  3.9   |  28  |  1.0    Ca    9.7      23 Mar 2022 20:01    TPro  7.0  /  Alb  4.3  /  TBili  0.5  /  DBili  x   /  AST  15  /  ALT  8   /  AlkPhos  68  03-23      LIVER FUNCTIONS - ( 23 Mar 2022 20:01 )  Alb: 4.3 g/dL / Pro: 7.0 g/dL / ALK PHOS: 68 U/L / ALT: 8 U/L / AST: 15 U/L / GGT: x             CARDIAC MARKERS ( 23 Mar 2022 20:01 )  x     / <0.01 ng/mL / x     / x     / x                  IMAGING:

## 2022-03-23 NOTE — ED PROVIDER NOTE - CLINICAL SUMMARY MEDICAL DECISION MAKING FREE TEXT BOX
Patient presented with acute onset of swelling of LUE s/p stent placement in L subclavian vein. Otherwise on arrival patient afebrile, HD stable, neurovascularly intact but (+) marked swelling on exam. No evidence of infection and compartments soft. Obtained labs which were grossly unremarkable including no significant leukocytosis, anemia, signs of dehydration/TIAN, transaminitis or significant electrolyte abnormalities. Vascular studies revealed normal arterial study but venous study showed (+) extensive DVT as documented above in reads. CT chest showed (+) possible metallic emboli. Consulted vascular surgery who evaluated patient in ED - recommending heparin gtt and admission to their service for further management. Patient agreeable with plan. HD stable at time of admission.

## 2022-03-23 NOTE — ED PROVIDER NOTE - NS ED ROS FT
Review of Systems:  	•	CONSTITUTIONAL: no fever  	•	SKIN: no rash  	•	RESPIRATORY: no shortness of breath, no cough  	•	CARDIAC: +chest pain, no palpitations  	•	GI: no abd pain, no nausea, no vomiting, no diarrhea  	•	GENITO-URINARY: no discharge, no dysuria; no hematuria, no increased urinary frequency  	•	MUSCULOSKELETAL: +LUE swelling/redness  	•	NEUROLOGIC: no weakness, no paresthesias   	•	PSYCH: no anxiety, non suicidal, non homicidal, no hallucination, no depression

## 2022-03-23 NOTE — ED PROVIDER NOTE - ATTENDING CONTRIBUTION TO CARE
51 year old female, pmhx as documented presenting with LUE swelling as well as L upper chest pain since the procedure (approx 9 days ago) documented above described as burning, non-radiating, no palliative or provocative factors, moderate severity. Otherwise denies fevers, dyspnea, palpitations, N/V/D, blood in stool or any other complaints.    Vital Signs: I have reviewed the initial vital signs.  Constitutional: NAD, well-nourished, appears stated age, no acute distress.  HEENT: Airway patent, moist MM, no erythema/swelling/deformity of oral structures. EOMI, PERRLA.  CV: regular rate, regular rhythm, well-perfused extremities, 2+ b/l DP and radial pulses equal.  Lungs: BCTA, no increased WOB.  ABD: NTND, no guarding or rebound, no pulsatile mass, no hernias.   MSK: Neck supple, nontender, nl ROM, no stepoff. Chest nontender. Back nontender in TLS spine or to b/l bony structures or flanks. (+) L hand markedly swollen but no signs of infection, no crepitus, compartments soft, remainder of ext nontender, nl rom, no deformity.   INTEG: Skin warm, dry, no rash.  NEURO: A&Ox3, normal strength, nl sensation throughout, normal speech.   PSYCH: Calm, cooperative, normal affect and interaction.    Will obtain labs, vascular studies of LUE, CT chest to r/o SVC syndrome, consult vascular, re-eval.

## 2022-03-23 NOTE — ED PROVIDER NOTE - OBJECTIVE STATEMENT
51 year old female, past medical history subclavian stenosis s/p stent placement 51 year old female, past medical history subclavian stenosis s/p stent placement, htn, hdl, ppm/aicd, who presents with LUE swelling and chest pain. patient with recent stent assessment s/p cath LUE 3/14, follows with dr mackay. patient with worsening swelling and chest pain today prompting ed eval. denies fever, chills, SOB, back pain, numbness/weakness, syncope.

## 2022-03-23 NOTE — H&P ADULT - HISTORY OF PRESENT ILLNESS
51 year old woman with PMH of lupus with lupus anticoagulant, rheumatoid arthritis, CHF s/p AICD+PPM, DVT/PE in 2015 on Xarelto, NSTEMI in 2015, subclavian stenosis s/p stent placement, HTN, HLD, PPM/AICD, who presents with LUE swelling and chest pain. patient with recent stent assessment s/p cath LUE 3/14,and follows with dr mackay. patient with worsening swelling and chest pain today prompting ed eval. denies fever, chills, SOB, back pain, numbness/weakness, syncope.    in the ED,pt's VS T(C): 36.9 (03-23-22 @ 23:57), Max: 37 (03-23-22 @ 18:03)  HR: 89 (03-23-22 @ 23:57) (89 - 117)  BP: 180/88 (03-23-22 @ 23:57) (141/88 - 180/88)  RR: 17 (03-23-22 @ 18:03) (17 - 17)  SpO2: 98% (03-23-22 @ 23:57) (97% - 98%), labs done were unremarkable.   < from: CT Chest w/ IV Cont (03.23.22 @ 20:10) >  Metallic densities are noted in the inferior and superior right segmental   pulmonary arteries. Findings are concerning for metallic emboli of   indeterminate age. No right heart strain is noted.  Findings may reflect phlebitis of the left axillary/subclavian vein.  Left subclavian vein stent is noted. Unable to evaluate patency as the   phase of contrast is predominantly arterial.  pt  was started on Heparin drip.   pt is admitted for workup/management 51 year old woman with PMH of lupus with lupus anticoagulant, rheumatoid arthritis, cervical cancer sp hysterectomy,  laryngeal cancer sp CT, RT, CHF s/p AICD+PPM, DVT/PE in 2015 on Xarelto, NSTEMI in 2015, subclavian stenosis s/p stent placement, HTN, HLD, PPM/AICD, who presents with LUE swelling and chest pain. patient with recent stent assessment s/p cath LUE 3/14,and follows with dr Guan. patient with worsening swelling and chest pain today prompting ed eval. denies fever, chills, SOB, back pain, numbness/weakness, syncope.    in the ED,pt's VS T(C): 36.9 (03-23-22 @ 23:57), Max: 37 (03-23-22 @ 18:03)  HR: 89 (03-23-22 @ 23:57) (89 - 117)  BP: 180/88 (03-23-22 @ 23:57) (141/88 - 180/88)  RR: 17 (03-23-22 @ 18:03) (17 - 17)  SpO2: 98% (03-23-22 @ 23:57) (97% - 98%), labs done were unremarkable.   < from: CT Chest w/ IV Cont (03.23.22 @ 20:10) >  Metallic densities are noted in the inferior and superior right segmental   pulmonary arteries. Findings are concerning for metallic emboli of   indeterminate age. No right heart strain is noted.  Findings may reflect phlebitis of the left axillary/subclavian vein.  Left subclavian vein stent is noted. Unable to evaluate patency as the   phase of contrast is predominantly arterial.  pt  was started on Heparin drip.   pt is admitted for workup/management

## 2022-03-23 NOTE — ED PROVIDER NOTE - PROGRESS NOTE DETAILS
onel: discussed with vascular surgery who is aware of patient - will eval patient in ed and provide recs. onel: started on heparin, will admit to medicine.

## 2022-03-23 NOTE — H&P ADULT - NSHPPHYSICALEXAM_GEN_ALL_CORE
T(C): 36.9 (03-23-22 @ 23:57), Max: 37 (03-23-22 @ 18:03)  HR: 89 (03-23-22 @ 23:57) (89 - 117)  BP: 180/88 (03-23-22 @ 23:57) (141/88 - 180/88)  RR: 17 (03-23-22 @ 18:03) (17 - 17)  SpO2: 98% (03-23-22 @ 23:57) (97% - 98%)    PHYSICAL EXAM:  GENERAL: NAD, well-developed  HEAD:  Atraumatic, Normocephalic  EYES: EOMI, PERRLA, conjunctiva and sclera clear  ENT:No nasal obstruction or discharge. No tonsillar exudate, swelling or erythema.  NECK: Supple, No JVD  CHEST/LUNG: Clear to auscultation bilaterally; No wheeze  HEART: Regular rate and rhythm; No murmurs, rubs, or gallops  ABDOMEN: Soft, Nontender, Nondistended; Bowel sounds present  EXTREMITIES:  2+ Peripheral Pulses, No clubbing, cyanosis, or edema  PSYCH: AAOx3  NEUROLOGY: non-focal  SKIN: No rashes or lesions

## 2022-03-23 NOTE — CONSULT NOTE ADULT - ASSESSMENT
ASSESSMENT:  51yF w/ PMHx of  SLE, RA, HTN, CHF, COPD, DVT, PE, Laryngeal CA, GERD, cervical CA  who presented with left upper extremity swelling. Physical exam findings, imaging, and labs as documented above.       PLAN:  - Start AC for DVT in upper extremity  - Compression wrap over LUE   - EP follow up within the week for pacemaker   - Plans discussed with Fellow   - Will continue to follow     Lines/Tubes: PIV,    Above plan discussed with Attending Surgeon Dr. Guan, patient, patient family, and Primary team  03-23-22 @ 22:18   ASSESSMENT:  51yF w/ PMHx of  SLE, RA, HTN, CHF, COPD, DVT, PE, Laryngeal CA, GERD, cervical CA  who presented with left upper extremity swelling. Physical exam findings, imaging, and labs as documented above.       PLAN:  - Start AC for DVT in upper extremity  - Compression wrap over LUE   - Recommend medical admission for further management of DVT   - Recommend EP consult for pacemaker evaluation   - No acute vascular surgical intervention at this time   - Plans discussed with Fellow   - Will continue to follow     Lines/Tubes: PIV,    Above plan discussed with Attending Surgeon Dr. Guan, patient, patient family, and Primary team  03-23-22 @ 22:18

## 2022-03-23 NOTE — H&P ADULT - NSHPLABSRESULTS_GEN_ALL_CORE
14.4   8.13  )-----------( 143      ( 23 Mar 2022 20:01 )             43.5       03-23    138  |  99  |  13  ----------------------------<  99  3.9   |  28  |  1.0    Ca    9.7      23 Mar 2022 20:01    TPro  7.0  /  Alb  4.3  /  TBili  0.5  /  DBili  x   /  AST  15  /  ALT  8   /  AlkPhos  68  03-23                      Lactate Trend      CARDIAC MARKERS ( 23 Mar 2022 20:01 )  x     / <0.01 ng/mL / x     / x     / x            CAPILLARY BLOOD GLUCOSE            < from: CT Chest w/ IV Cont (03.23.22 @ 20:10) >      Metallic densities are noted in the inferior and superior right segmental   pulmonary arteries. Findings are concerning for metallic emboli of   indeterminate age. No right heart strain is noted.    Findings may reflect phlebitis of the left axillary/subclavian vein.    Left subclavian vein stent is noted. Unable to evaluate patency as the   phase of contrast is predominantly arterial.

## 2022-03-23 NOTE — ED PROVIDER NOTE - CARE PLAN
Principal Discharge DX:	Venous embolism and thrombosis of subclavian vein, left  Secondary Diagnosis:	Chest pain   1

## 2022-03-23 NOTE — H&P ADULT - ASSESSMENT
51 year old woman with PMH of lupus with lupus anticoagulant, rheumatoid arthritis, cervical cancer sp hysterectomy,  laryngeal cancer sp CT, RT, CHF s/p AICD+PPM, DVT/PE in 2015 on Xarelto, NSTEMI in 2015, subclavian stenosis s/p stent placement, HTN, HLD, PPM/AICD, who presents with LUE swelling and chest pain. patient with recent stent assessment s/p cath LUE 3/14,and follows with dr Guan. patient with worsening swelling and chest pain today prompting ed eval. denies fever, chills, SOB, back pain, numbness/weakness, syncope.    #Right upper extremity edema  # Metallic densities are noted in the inferior and superior right segmental   pulmonary arteries.   # phlebitis of the left axillary/subclavian vein.  - Underwent LUE venous angiogram 3/18/22 for long standing arm swelling  - chronic indwelling LUE pacemaker and left axillosubclavian venous stent   - Stent is occluded on venogram and vx  unable to open it in an endovascular manner , per vascular no further surgical options we can offer her.  - CT Chest w/ IV Cont (03.23.22 @ 20:10) >  Metallic densities are noted in the inferior and superior right segmental   pulmonary arteries. Findings are concerning for metallic emboli of   indeterminate age. No right heart strain is noted.  Findings may reflect phlebitis of the left axillary/subclavian vein.  Left subclavian vein stent is noted. Unable to evaluate patency as the   phase of contrast is predominantly arterial.  - pt was evaluated by Vascular   - Found to have axillary & subclavian DVTs despite being on Xarelto   - Recommend EP evaluation for possible removal/relocating pacemaker & anticoagulation for her DVTs  - Arm elevation, cold compresses  - EP consult.   - pt  was started on Heparin drip.   - fu serial PTTs.   - Consider Hem/onc consult for AC    History of long-QT syndrome and VT sp AICD  CHFpEF    - EF 63% 2019  - EP on board  - s/p AICD placement      History of VTE on Xarelto  - on xarelto at home, on heparin now.     SLE    - not in flare  - On Xarelto, on hold for now.      RA  - Outpatient f/u      COPD    - not in exacerbation  - Continue inhalers and nebs for COPD        - DVT ppx: Xarelto  - Code status: full code  - Diet soft

## 2022-03-23 NOTE — ED PROVIDER NOTE - PHYSICAL EXAMINATION
CONSTITUTIONAL: Well-developed; well-nourished; in no acute distress, nontoxic appearing  SKIN: skin exam is warm and dry  ENT: MMM   CARD: S1, S2 normal, no murmur  RESP: No wheezes, rales or rhonchi. Good air movement bilaterally  ABD: soft; non-distended; non-tender. No Rebound, No guarding  EXT: LUE: +edema to dorsum of hand to L neck, pulses 2+. no bony tenderness.   NEURO: awake, alert, following commands, oriented, grossly unremarkable. No Focal deficits. GCS 15.   PSYCH: Cooperative, appropriate.

## 2022-03-23 NOTE — ED PROVIDER NOTE - NS ED ATTENDING STATEMENT MOD
This was a shared visit with the DEYANIRA. I reviewed and verified the documentation and independently performed the documented:

## 2022-03-23 NOTE — H&P ADULT - ATTENDING COMMENTS
Patient was seen independently by attending. Latest vital signs and labs were reviewed today. Case was discussed with house staff including resident , nursing & . Following additions/modifications to assessment & plan override resident note above .       ASSESSMENT & PLAN:  51 year old woman with PMH of lupus with lupus anticoagulant, rheumatoid arthritis, cervical cancer sp hysterectomy,  laryngeal cancer sp CT, RT, CHF s/p AICD+PPM, DVT/PE in 2015 on Xarelto, NSTEMI in 2015, subclavian stenosis s/p stent placement, HTN, HLD, PPM/AICD, who presents with LUE swelling and chest pain.       Pulmonary emboli, possibly metallic  Possible Left subclavian vein stent thrombosis  Acute axillary & subclavian DVT  chronic indwelling LUE pacemaker and left axillosubclavian venous stent   Known h/o lupus anticoagulant      Acute axillary & subclavian DVT/PE-possible Xarelto failure. No right heart strain  Currently on heparin. Will switch to Lovenox and get hematology consult for a/c   Vascular -no intervention  EP- no intervention needed  History of long-QT syndrome and VT sp AICD  Chronic CHFpEF-euvolemic  SLE- not in flare  Continue current medical management for active chronic comorbid conditions.  Supportive care including activity, diet, goals of care discussed in AM rounds.  Discussed with  / in AM rounds  Expected inpatient care exceeds 48 hrs.

## 2022-03-24 LAB
A1C WITH ESTIMATED AVERAGE GLUCOSE RESULT: 5.2 % — SIGNIFICANT CHANGE UP (ref 4–5.6)
ALBUMIN SERPL ELPH-MCNC: 4.3 G/DL — SIGNIFICANT CHANGE UP (ref 3.5–5.2)
ALP SERPL-CCNC: 71 U/L — SIGNIFICANT CHANGE UP (ref 30–115)
ALT FLD-CCNC: 9 U/L — SIGNIFICANT CHANGE UP (ref 0–41)
ANION GAP SERPL CALC-SCNC: 18 MMOL/L — HIGH (ref 7–14)
APTT BLD: 57.1 SEC — HIGH (ref 27–39.2)
APTT BLD: >200 SEC — CRITICAL HIGH (ref 27–39.2)
AST SERPL-CCNC: 17 U/L — SIGNIFICANT CHANGE UP (ref 0–41)
BASOPHILS # BLD AUTO: 0.06 K/UL — SIGNIFICANT CHANGE UP (ref 0–0.2)
BASOPHILS NFR BLD AUTO: 0.6 % — SIGNIFICANT CHANGE UP (ref 0–1)
BILIRUB SERPL-MCNC: 0.5 MG/DL — SIGNIFICANT CHANGE UP (ref 0.2–1.2)
BUN SERPL-MCNC: 12 MG/DL — SIGNIFICANT CHANGE UP (ref 10–20)
CALCIUM SERPL-MCNC: 9.5 MG/DL — SIGNIFICANT CHANGE UP (ref 8.5–10.1)
CHLORIDE SERPL-SCNC: 97 MMOL/L — LOW (ref 98–110)
CHOLEST SERPL-MCNC: 159 MG/DL — SIGNIFICANT CHANGE UP
CO2 SERPL-SCNC: 24 MMOL/L — SIGNIFICANT CHANGE UP (ref 17–32)
CREAT SERPL-MCNC: 0.8 MG/DL — SIGNIFICANT CHANGE UP (ref 0.7–1.5)
EGFR: 89 ML/MIN/1.73M2 — SIGNIFICANT CHANGE UP
EOSINOPHIL # BLD AUTO: 0.23 K/UL — SIGNIFICANT CHANGE UP (ref 0–0.7)
EOSINOPHIL NFR BLD AUTO: 2.4 % — SIGNIFICANT CHANGE UP (ref 0–8)
ESTIMATED AVERAGE GLUCOSE: 103 MG/DL — SIGNIFICANT CHANGE UP (ref 68–114)
GLUCOSE SERPL-MCNC: 123 MG/DL — HIGH (ref 70–99)
HCT VFR BLD CALC: 42.9 % — SIGNIFICANT CHANGE UP (ref 37–47)
HDLC SERPL-MCNC: 40 MG/DL — LOW
HGB BLD-MCNC: 14.2 G/DL — SIGNIFICANT CHANGE UP (ref 12–16)
IMM GRANULOCYTES NFR BLD AUTO: 0.4 % — HIGH (ref 0.1–0.3)
LIPID PNL WITH DIRECT LDL SERPL: 95 MG/DL — SIGNIFICANT CHANGE UP
LYMPHOCYTES # BLD AUTO: 3.46 K/UL — HIGH (ref 1.2–3.4)
LYMPHOCYTES # BLD AUTO: 36 % — SIGNIFICANT CHANGE UP (ref 20.5–51.1)
MCHC RBC-ENTMCNC: 29.3 PG — SIGNIFICANT CHANGE UP (ref 27–31)
MCHC RBC-ENTMCNC: 33.1 G/DL — SIGNIFICANT CHANGE UP (ref 32–37)
MCV RBC AUTO: 88.5 FL — SIGNIFICANT CHANGE UP (ref 81–99)
MONOCYTES # BLD AUTO: 1 K/UL — HIGH (ref 0.1–0.6)
MONOCYTES NFR BLD AUTO: 10.4 % — HIGH (ref 1.7–9.3)
NEUTROPHILS # BLD AUTO: 4.81 K/UL — SIGNIFICANT CHANGE UP (ref 1.4–6.5)
NEUTROPHILS NFR BLD AUTO: 50.2 % — SIGNIFICANT CHANGE UP (ref 42.2–75.2)
NON HDL CHOLESTEROL: 119 MG/DL — SIGNIFICANT CHANGE UP
NRBC # BLD: 0 /100 WBCS — SIGNIFICANT CHANGE UP (ref 0–0)
PLATELET # BLD AUTO: 178 K/UL — SIGNIFICANT CHANGE UP (ref 130–400)
POTASSIUM SERPL-MCNC: 3.6 MMOL/L — SIGNIFICANT CHANGE UP (ref 3.5–5)
POTASSIUM SERPL-SCNC: 3.6 MMOL/L — SIGNIFICANT CHANGE UP (ref 3.5–5)
PROT SERPL-MCNC: 6.8 G/DL — SIGNIFICANT CHANGE UP (ref 6–8)
RBC # BLD: 4.85 M/UL — SIGNIFICANT CHANGE UP (ref 4.2–5.4)
RBC # FLD: 14.2 % — SIGNIFICANT CHANGE UP (ref 11.5–14.5)
SARS-COV-2 RNA SPEC QL NAA+PROBE: SIGNIFICANT CHANGE UP
SODIUM SERPL-SCNC: 139 MMOL/L — SIGNIFICANT CHANGE UP (ref 135–146)
TRIGL SERPL-MCNC: 115 MG/DL — SIGNIFICANT CHANGE UP
WBC # BLD: 9.6 K/UL — SIGNIFICANT CHANGE UP (ref 4.8–10.8)
WBC # FLD AUTO: 9.6 K/UL — SIGNIFICANT CHANGE UP (ref 4.8–10.8)

## 2022-03-24 PROCEDURE — 99223 1ST HOSP IP/OBS HIGH 75: CPT

## 2022-03-24 RX ORDER — ALPRAZOLAM 0.25 MG
2 TABLET ORAL
Refills: 0 | Status: DISCONTINUED | OUTPATIENT
Start: 2022-03-24 | End: 2022-03-26

## 2022-03-24 RX ORDER — ONDANSETRON 8 MG/1
4 TABLET, FILM COATED ORAL EVERY 8 HOURS
Refills: 0 | Status: DISCONTINUED | OUTPATIENT
Start: 2022-03-24 | End: 2022-03-26

## 2022-03-24 RX ORDER — HYDRALAZINE HCL 50 MG
100 TABLET ORAL DAILY
Refills: 0 | Status: DISCONTINUED | OUTPATIENT
Start: 2022-03-24 | End: 2022-03-24

## 2022-03-24 RX ORDER — ALBUTEROL 90 UG/1
4 AEROSOL, METERED ORAL EVERY 4 HOURS
Refills: 0 | Status: DISCONTINUED | OUTPATIENT
Start: 2022-03-24 | End: 2022-03-26

## 2022-03-24 RX ORDER — ACETAMINOPHEN 500 MG
650 TABLET ORAL EVERY 6 HOURS
Refills: 0 | Status: DISCONTINUED | OUTPATIENT
Start: 2022-03-24 | End: 2022-03-26

## 2022-03-24 RX ORDER — ASPIRIN/CALCIUM CARB/MAGNESIUM 324 MG
0 TABLET ORAL
Qty: 0 | Refills: 0 | DISCHARGE

## 2022-03-24 RX ORDER — TIOTROPIUM BROMIDE 18 UG/1
1 CAPSULE ORAL; RESPIRATORY (INHALATION) DAILY
Refills: 0 | Status: DISCONTINUED | OUTPATIENT
Start: 2022-03-24 | End: 2022-03-26

## 2022-03-24 RX ORDER — MORPHINE SULFATE 50 MG/1
120 CAPSULE, EXTENDED RELEASE ORAL
Refills: 0 | Status: DISCONTINUED | OUTPATIENT
Start: 2022-03-24 | End: 2022-03-26

## 2022-03-24 RX ORDER — HYDRALAZINE HCL 50 MG
50 TABLET ORAL
Refills: 0 | Status: DISCONTINUED | OUTPATIENT
Start: 2022-03-24 | End: 2022-03-26

## 2022-03-24 RX ORDER — ALPRAZOLAM 0.25 MG
0 TABLET ORAL
Qty: 0 | Refills: 0 | DISCHARGE

## 2022-03-24 RX ORDER — MORPHINE SULFATE 50 MG/1
1 CAPSULE, EXTENDED RELEASE ORAL
Qty: 0 | Refills: 0 | DISCHARGE

## 2022-03-24 RX ORDER — ALPRAZOLAM 0.25 MG
1 TABLET ORAL THREE TIMES A DAY
Refills: 0 | Status: DISCONTINUED | OUTPATIENT
Start: 2022-03-24 | End: 2022-03-24

## 2022-03-24 RX ORDER — ENOXAPARIN SODIUM 100 MG/ML
70 INJECTION SUBCUTANEOUS EVERY 12 HOURS
Refills: 0 | Status: DISCONTINUED | OUTPATIENT
Start: 2022-03-24 | End: 2022-03-26

## 2022-03-24 RX ORDER — METOPROLOL TARTRATE 50 MG
100 TABLET ORAL DAILY
Refills: 0 | Status: DISCONTINUED | OUTPATIENT
Start: 2022-03-24 | End: 2022-03-26

## 2022-03-24 RX ORDER — ASPIRIN/CALCIUM CARB/MAGNESIUM 324 MG
81 TABLET ORAL DAILY
Refills: 0 | Status: DISCONTINUED | OUTPATIENT
Start: 2022-03-24 | End: 2022-03-26

## 2022-03-24 RX ORDER — HEPARIN SODIUM 5000 [USP'U]/ML
1300 INJECTION INTRAVENOUS; SUBCUTANEOUS
Qty: 25000 | Refills: 0 | Status: DISCONTINUED | OUTPATIENT
Start: 2022-03-24 | End: 2022-03-24

## 2022-03-24 RX ORDER — METOCLOPRAMIDE HCL 10 MG
10 TABLET ORAL
Refills: 0 | Status: DISCONTINUED | OUTPATIENT
Start: 2022-03-24 | End: 2022-03-26

## 2022-03-24 RX ORDER — MORPHINE SULFATE 50 MG/1
60 CAPSULE, EXTENDED RELEASE ORAL EVERY 8 HOURS
Refills: 0 | Status: DISCONTINUED | OUTPATIENT
Start: 2022-03-24 | End: 2022-03-24

## 2022-03-24 RX ORDER — HEPARIN SODIUM 5000 [USP'U]/ML
1000 INJECTION INTRAVENOUS; SUBCUTANEOUS
Qty: 25000 | Refills: 0 | Status: DISCONTINUED | OUTPATIENT
Start: 2022-03-24 | End: 2022-03-24

## 2022-03-24 RX ORDER — LANOLIN ALCOHOL/MO/W.PET/CERES
3 CREAM (GRAM) TOPICAL AT BEDTIME
Refills: 0 | Status: DISCONTINUED | OUTPATIENT
Start: 2022-03-24 | End: 2022-03-26

## 2022-03-24 RX ORDER — BUDESONIDE AND FORMOTEROL FUMARATE DIHYDRATE 160; 4.5 UG/1; UG/1
2 AEROSOL RESPIRATORY (INHALATION)
Refills: 0 | Status: DISCONTINUED | OUTPATIENT
Start: 2022-03-24 | End: 2022-03-26

## 2022-03-24 RX ADMIN — Medication 10 MILLIGRAM(S): at 06:38

## 2022-03-24 RX ADMIN — MORPHINE SULFATE 120 MILLIGRAM(S): 50 CAPSULE, EXTENDED RELEASE ORAL at 23:20

## 2022-03-24 RX ADMIN — MORPHINE SULFATE 60 MILLIGRAM(S): 50 CAPSULE, EXTENDED RELEASE ORAL at 06:38

## 2022-03-24 RX ADMIN — MORPHINE SULFATE 120 MILLIGRAM(S): 50 CAPSULE, EXTENDED RELEASE ORAL at 22:37

## 2022-03-24 RX ADMIN — MORPHINE SULFATE 60 MILLIGRAM(S): 50 CAPSULE, EXTENDED RELEASE ORAL at 02:17

## 2022-03-24 RX ADMIN — Medication 100 MILLIGRAM(S): at 06:37

## 2022-03-24 RX ADMIN — MORPHINE SULFATE 60 MILLIGRAM(S): 50 CAPSULE, EXTENDED RELEASE ORAL at 03:00

## 2022-03-24 RX ADMIN — Medication 50 MILLIGRAM(S): at 18:29

## 2022-03-24 RX ADMIN — Medication 2 MILLIGRAM(S): at 14:33

## 2022-03-24 RX ADMIN — TIOTROPIUM BROMIDE 1 CAPSULE(S): 18 CAPSULE ORAL; RESPIRATORY (INHALATION) at 10:07

## 2022-03-24 RX ADMIN — Medication 1 MILLIGRAM(S): at 01:58

## 2022-03-24 RX ADMIN — ALBUTEROL 4 PUFF(S): 90 AEROSOL, METERED ORAL at 15:30

## 2022-03-24 RX ADMIN — BUDESONIDE AND FORMOTEROL FUMARATE DIHYDRATE 2 PUFF(S): 160; 4.5 AEROSOL RESPIRATORY (INHALATION) at 19:31

## 2022-03-24 RX ADMIN — Medication 81 MILLIGRAM(S): at 11:48

## 2022-03-24 RX ADMIN — HEPARIN SODIUM 10 UNIT(S)/HR: 5000 INJECTION INTRAVENOUS; SUBCUTANEOUS at 14:48

## 2022-03-24 RX ADMIN — Medication 10 MILLIGRAM(S): at 22:37

## 2022-03-24 RX ADMIN — MORPHINE SULFATE 120 MILLIGRAM(S): 50 CAPSULE, EXTENDED RELEASE ORAL at 14:47

## 2022-03-24 RX ADMIN — Medication 10 MILLIGRAM(S): at 11:48

## 2022-03-24 RX ADMIN — ALBUTEROL 4 PUFF(S): 90 AEROSOL, METERED ORAL at 09:19

## 2022-03-24 RX ADMIN — ENOXAPARIN SODIUM 70 MILLIGRAM(S): 100 INJECTION SUBCUTANEOUS at 18:30

## 2022-03-24 RX ADMIN — Medication 2 MILLIGRAM(S): at 23:28

## 2022-03-24 RX ADMIN — ALBUTEROL 4 PUFF(S): 90 AEROSOL, METERED ORAL at 11:47

## 2022-03-24 NOTE — ED ADULT NURSE REASSESSMENT NOTE - NS ED NURSE REASSESS COMMENT FT1
transferred to 4 B 23 B , on heparin drip at 13 ml/ hr , IVL intact , AO  x 4 , no grimaced face noted , no SOB , all belongings sent to unit with pt

## 2022-03-24 NOTE — ED ADULT NURSE NOTE - OBJECTIVE STATEMENT
Went to f/u visit with Dr. Ochoa and was found to still be in afib despite medications, sent to ED for afib management. As per daughter pt has dementia and will not complain of anything.

## 2022-03-24 NOTE — CONSULT NOTE ADULT - NS ATTEND AMEND GEN_ALL_CORE FT
Patient found to have another occlusion of the subclavian stent despite anticoagulation.   Recommend change of anticoagulation and hematology consult   at this time I do not recommend any intervention.   At this time I do not recommend to move the device to the right side because if the other side occludes as well patient will develop SVC syndrome.  Agree with arm sleeve or bandages to  reduce swelling.

## 2022-03-24 NOTE — CHART NOTE - NSCHARTNOTEFT_GEN_A_CORE
Patient was seen & examined independently on bedside.   Latest vital signs, labs, imaging studies were reviewed today.  Consults reviewed.  Today's progress note added to H&P  Case was discussed with house staff in morning rounds for assessment and plan.

## 2022-03-24 NOTE — ED ADULT NURSE NOTE - NSIMPLEMENTINTERV_GEN_ALL_ED
Implemented All Universal Safety Interventions:  [place] to call system. Call bell, personal items and telephone within reach. Instruct patient to call for assistance. Room bathroom lighting operational. Non-slip footwear when patient is off stretcher. Physically safe environment: no spills, clutter or unnecessary equipment. Stretcher in lowest position, wheels locked, appropriate side rails in place.

## 2022-03-24 NOTE — ED ADULT NURSE REASSESSMENT NOTE - NS ED NURSE REASSESS COMMENT FT1
Assumed care from nurse Vizcaino at 2300 , pt AO x 4 , no SOB , denies chest pain , no SOB , LUE wrapped with dry and intact dressing , no drainage noted , on heparin drip at 13 ml /hr , report given to med surg RN

## 2022-03-24 NOTE — CONSULT NOTE ADULT - ASSESSMENT
EP Dr Eaton  Vasc Dr Guan    51y Female with h/o SLE with lupus anticoagulant was on Xarelto, RA, multiple cancers, NSTEMI, HFpEF, AICD for secondary prevention, with developing subclavian DVT, s/p lead extraction and replacement due to DVT 4/2021 with subclavian stent, presented with recurrent LUE edema    con't current management  recommend switch Xarelto to Eliquis 5mg bid and Aspirin 162mg    please consult pt's hemotologist  ace wraps to left hand / arm  No EP work up needed at this time  recall prn

## 2022-03-24 NOTE — CONSULT NOTE ADULT - SUBJECTIVE AND OBJECTIVE BOX
Patient is a 51y old  Female who presents with a chief complaint of       HPI:  51 year old woman with PMH of lupus with lupus anticoagulant, rheumatoid arthritis, cervical cancer sp hysterectomy,  laryngeal cancer sp CT, RT, CHF s/p AICD+PPM, DVT/PE in  on Xarelto, NSTEMI in 2015, subclavian stenosis s/p stent placement, HTN, HLD, PPM/AICD, who presents with LUE swelling and chest pain. patient with recent stent assessment s/p cath LUE 3/14,and follows with dr Guan. patient with worsening swelling and chest pain today prompting ed eval. denies fever, chills, SOB, back pain, numbness/weakness, syncope.    in the ED,pt's VS T(C): 36.9 (22 @ 23:57), Max: 37 (22 @ 18:03)  HR: 89 (22 @ 23:57) (89 - 117)  BP: 180/88 (22 @ 23:57) (141/88 - 180/88)  RR: 17 (22 @ 18:03) (17 - 17)  SpO2: 98% (22 @ 23:57) (97% - 98%), labs done were unremarkable.   < from: CT Chest w/ IV Cont (22 @ 20:10) >  Metallic densities are noted in the inferior and superior right segmental   pulmonary arteries. Findings are concerning for metallic emboli of   indeterminate age. No right heart strain is noted.  Findings may reflect phlebitis of the left axillary/subclavian vein.  Left subclavian vein stent is noted. Unable to evaluate patency as the   phase of contrast is predominantly arterial.  pt  was started on Heparin drip.   pt is admitted for workup/management (23 Mar 2022 23:58)      Electrophysiology:  51y Female with h/o SLE with lupus anticoagulant was on Xarelto, RA, multiple cancers, NSTEMI, HFpEF, AICD for secondary prevention, with developing subclavian DVT, s/p lead extraction and replacement due to DVT 2021 with subclavian stent, presented with recurrent LUE edema over last 2 month, Patient is compliant with Xarelto  In hospital, found to have recurrent DVT within subclavian stent     REVIEW OF SYSTEMS    [x ] A ten-point review of systems was otherwise negative except as noted.  [ ] Due to altered mental status/intubation, subjective information were not able to be obtained from the patient. History was obtained, to the extent possible, from review of the chart and collateral sources of information.      PAST MEDICAL & SURGICAL HISTORY:  Lupus    RA (rheumatoid arthritis)    HTN (hypertension)    CHF (congestive heart failure)    COPD (chronic obstructive pulmonary disease)    DVT (deep venous thrombosis)    Pulmonary embolism    History of laryngeal cancer    GERD (gastroesophageal reflux disease)    Cervical cancer    S/P appendectomy    S/P cholecystectomy    AICD (automatic cardioverter/defibrillator) present    S/P hysterectomy    History of back surgery    H/O foot surgery    S/P eye surgery    Subclavian arterial stenosis        Home Medications:  albuterol 90 mcg/inh inhalation aerosol: 2 puff(s) inhaled every 6 hours (24 Mar 2022 00:49)  ALPRAZolam 1 mg oral tablet: 1 tab(s) orally 3 times a day, As needed, Anxiety (24 Mar 2022 00:49)  Benlysta: 200 milligram(s) intravenous once a week (24 Mar 2022 00:49)  cyanocobalamin 1000 mcg/mL injectable solution: 1 milliliter(s) injectable once a week (24 Mar 2022 00:49)  folic acid 1 mg oral tablet: 1 tab(s) orally once a day (24 Mar 2022 00:49)  hydrALAZINE 50 mg oral tablet: 2 tab(s) orally once a day (24 Mar 2022 00:49)  Metoprolol Succinate  mg oral tablet, extended release: 1 tab(s) orally once a day (24 Mar 2022 00:49)  MS Contin 60 mg oral tablet, extended release: 1 tab(s) orally 3 times a day (after meals) (24 Mar 2022 00:49)  Reglan 10 mg oral tablet: 1 tab(s) orally 4 times a day (before meals and at bedtime) (24 Mar 2022 00:49)  Trelegy Ellipta 100 mcg-62.5 mcg-25 mcg/inh inhalation powder: 1 puff(s) inhaled once a day (24 Mar 2022 00:49)  Xarelto 20 mg oral tablet: 1 tab(s) orally once a day (in the evening) (24 Mar 2022 00:49)      Allergies:  adhesives (Rash; Urticaria)  Arava (Anaphylaxis; Angioedema)  Avelox (Other)  Plaquenil (Other)  Vantin (Other (Mild))      FAMILY HISTORY:  Family history of cervical cancer    FH: thyroid cancer        SOCIAL HISTORY:    CIGARETTES:  ALCOHOL:        PREVIOUS DIAGNOSTIC TESTING:      ECHO  FINDINGS:  < from: Transthoracic Echocardiogram (19 @ 11:34) >  Summary:   1. Normal global left ventricular systolic function.   2. LV Ejection Fraction by Hendricks's Method with a biplane EF of 63 %.   3. Normal left ventricular internal cavity size.   4. Normal right atrial size.   5. There is no evidence of pericardial effusion.   6. Mild thickening of the anterior and posterior mitral valve leaflets.   7. Peak transaortic gradient equals 8.5 mmHg, mean transaortic gradient   equals 3.7 mmHg, the calculated aortic valve area equals 2.30 cm² by the   continuity equation consistent with mild aortic stenosis.    < end of copied text >    STRESS  FINDINGS:    CATHETERIZATION  FINDINGS:    ELECTROPHYSIOLOGY STUDY  FINDINGS:    CAROTID ULTRASOUND:  FINDINGS    VENOUS DUPLEX SCAN:  FINDINGS:    < from: VA Duplex Upper Ext Vein Scan, Left (22 @ 21:22) >  Acute deep vein thrombosis of the left internal jugular, subclavian,   axillary and brachial veins.    The left radial and ulnar veins were visualized and were free of   thrombus.  Theveins were compressible with presence of spontaneous flow,   augmentation with distal compression and phasicity.    The cephalic vein is patent.  The left basilic vein is thrombosed.    Impression:  Acute deep vein thrombosis of the left internal jugular, subclavian,   axillary and brachial veins.  Superficial thrombophlebitis of the left basilic vein.    < end of copied text >    CHEST CT with IV Contrast:  FINDINGS:  < from: CT Chest IV Cont (22 @ 20:10) >  IMPRESSION:    Metallic densities are noted in the inferior and superior right segmental   pulmonary arteries. Findings are concerning for metallic emboli of   indeterminate age. No right heart strain is noted.    Findings may reflect phlebitis of the left axillary/subclavian vein.    Left subclavian vein stent is noted. Unable to evaluate patency as the   phase of contrast is predominantly arterial.    < end of copied text >        MEDICATIONS  (STANDING):  ALBUTerol  90 MICROgram(s) HFA Inhaler - Peds 4 Puff(s) Inhalation every 4 hours  ALPRAZolam 2 milliGRAM(s) Oral <User Schedule>  aspirin enteric coated 81 milliGRAM(s) Oral daily  budesonide  80 MICROgram(s)/formoterol 4.5 MICROgram(s) Inhaler 2 Puff(s) Inhalation two times a day  heparin  Infusion 1000 Unit(s)/Hr (10 mL/Hr) IV Continuous <Continuous>  hydrALAZINE 50 milliGRAM(s) Oral two times a day  metoclopramide 10 milliGRAM(s) Oral Before meals and at bedtime  metoprolol succinate  milliGRAM(s) Oral daily  morphine ER Tablet 120 milliGRAM(s) Oral <User Schedule>  tiotropium 18 MICROgram(s) Capsule 1 Capsule(s) Inhalation daily    MEDICATIONS  (PRN):  acetaminophen     Tablet .. 650 milliGRAM(s) Oral every 6 hours PRN Temp greater or equal to 38C (100.4F), Mild Pain (1 - 3)  aluminum hydroxide/magnesium hydroxide/simethicone Suspension 30 milliLiter(s) Oral every 4 hours PRN Dyspepsia  melatonin 3 milliGRAM(s) Oral at bedtime PRN Insomnia  ondansetron Injectable 4 milliGRAM(s) IV Push every 8 hours PRN Nausea and/or Vomiting      Vital Signs Last 24 Hrs  T(C): 36.7 (24 Mar 2022 08:11), Max: 37 (23 Mar 2022 18:03)  T(F): 98.1 (24 Mar 2022 08:11), Max: 98.6 (23 Mar 2022 18:03)  HR: 84 (24 Mar 2022 08:11) (70 - 117)  BP: 161/82 (24 Mar 2022 08:11) (141/88 - 180/88)  BP(mean): --  RR: 18 (24 Mar 2022 08:11) (17 - 18)  SpO2: 95% (24 Mar 2022 04:08) (95% - 98%)    PHYSICAL EXAM:    GENERAL: In no apparent distress, well nourished, and hydrated.  HEAD:  Atraumatic, Normocephalic  EYES: EOMI, PERRLA, conjunctiva and sclera clear  NECK: Supple and normal thyroid.  No JVD or carotid bruit.  Carotid pulse is 2+ bilaterally.  HEART: Regular rate and rhythm; No murmurs, rubs, or gallops.  PULMONARY: Clear to auscultation and perfusion.  No rales, wheezing, or rhonchi bilaterally.  ABDOMEN: Soft, Nontender, Nondistended; Bowel sounds present  EXTREMITIES:  2+ Peripheral Pulses, No clubbing, cyanosis, or edema  *left hand non-pitting edema edema, ace qwrap in place   NEUROLOGICAL: Grossly nonfocal    I&O's Detail    23 Mar 2022 07:01  -  24 Mar 2022 07:00  --------------------------------------------------------  IN:    Heparin Infusion: 52 mL  Total IN: 52 mL    OUT:  Total OUT: 0 mL    Total NET: 52 mL        Daily Height in cm: 160.02 (23 Mar 2022 18:03)    Daily     INTERPRETATION OF TELEMETRY:    EC Lead ECG:   Ventricular Rate 77 BPM    Atrial Rate 77 BPM    P-R Interval 144 ms    QRS Duration 78 ms    Q-T Interval 358 ms    QTC Calculation(Bazett) 405 ms    P Axis 62 degrees    R Axis 42 degrees    T Axis 39 degrees    Diagnosis Line Normal sinus rhythm  Cannot rule out Anterior infarct , age undetermined  Abnormal ECG    Confirmed by MILAD YDKES MD ([id number]) on 3/24/2022 6:03:21 AM (22 @ 19:27)  12 Lead ECG:   Ventricular Rate 114 BPM    Atrial Rate 114 BPM    P-R Interval 142 ms    QRS Duration 74 ms    Q-T Interval 310 ms    QTC Calculation(Bazett) 427 ms    P Axis 85 degrees    R Axis 75 degrees    T Axis 72 degrees    Diagnosis Line Sinus tachycardia  Right atrial enlargement  Borderline ECG    Confirmed by MILAD DYKES MD ([id number]) on 3/24/2022 6:03:20 AM (22 @ 18:02)        LABS:                        14.2   9.60  )-----------( 178      ( 24 Mar 2022 04:30 )             42.9         139  |  97<L>  |  12  ----------------------------<  123<H>  3.6   |  24  |  0.8    Ca    9.5      24 Mar 2022 04:30    TPro  6.8  /  Alb  4.3  /  TBili  0.5  /  DBili  x   /  AST  17  /  ALT  9   /  AlkPhos  71      CARDIAC MARKERS ( 23 Mar 2022 20:01 )  x     / <0.01 ng/mL / x     / x     / x          PTT - ( 24 Mar 2022 12:28 )  PTT:57.1 sec    BNP      COVID-19 PCR: NotDetec (22 @ 20:23)        RADIOLOGY & ADDITIONAL STUDIES:

## 2022-03-25 ENCOUNTER — TRANSCRIPTION ENCOUNTER (OUTPATIENT)
Age: 52
End: 2022-03-25

## 2022-03-25 LAB
ALBUMIN SERPL ELPH-MCNC: 4.5 G/DL — SIGNIFICANT CHANGE UP (ref 3.5–5.2)
ALP SERPL-CCNC: 69 U/L — SIGNIFICANT CHANGE UP (ref 30–115)
ALT FLD-CCNC: 9 U/L — SIGNIFICANT CHANGE UP (ref 0–41)
ANION GAP SERPL CALC-SCNC: 10 MMOL/L — SIGNIFICANT CHANGE UP (ref 7–14)
APTT BLD: 57.9 SEC — HIGH (ref 27–39.2)
AST SERPL-CCNC: 19 U/L — SIGNIFICANT CHANGE UP (ref 0–41)
BASOPHILS # BLD AUTO: 0.05 K/UL — SIGNIFICANT CHANGE UP (ref 0–0.2)
BASOPHILS NFR BLD AUTO: 0.7 % — SIGNIFICANT CHANGE UP (ref 0–1)
BILIRUB SERPL-MCNC: 0.5 MG/DL — SIGNIFICANT CHANGE UP (ref 0.2–1.2)
BUN SERPL-MCNC: 11 MG/DL — SIGNIFICANT CHANGE UP (ref 10–20)
CALCIUM SERPL-MCNC: 9.6 MG/DL — SIGNIFICANT CHANGE UP (ref 8.5–10.1)
CHLORIDE SERPL-SCNC: 97 MMOL/L — LOW (ref 98–110)
CO2 SERPL-SCNC: 26 MMOL/L — SIGNIFICANT CHANGE UP (ref 17–32)
CREAT SERPL-MCNC: 0.8 MG/DL — SIGNIFICANT CHANGE UP (ref 0.7–1.5)
EGFR: 89 ML/MIN/1.73M2 — SIGNIFICANT CHANGE UP
EOSINOPHIL # BLD AUTO: 0.22 K/UL — SIGNIFICANT CHANGE UP (ref 0–0.7)
EOSINOPHIL NFR BLD AUTO: 3 % — SIGNIFICANT CHANGE UP (ref 0–8)
GLUCOSE SERPL-MCNC: 96 MG/DL — SIGNIFICANT CHANGE UP (ref 70–99)
HCT VFR BLD CALC: 45.7 % — SIGNIFICANT CHANGE UP (ref 37–47)
HGB BLD-MCNC: 14.5 G/DL — SIGNIFICANT CHANGE UP (ref 12–16)
IMM GRANULOCYTES NFR BLD AUTO: 0.4 % — HIGH (ref 0.1–0.3)
LYMPHOCYTES # BLD AUTO: 2.62 K/UL — SIGNIFICANT CHANGE UP (ref 1.2–3.4)
LYMPHOCYTES # BLD AUTO: 35.2 % — SIGNIFICANT CHANGE UP (ref 20.5–51.1)
MAGNESIUM SERPL-MCNC: 1.7 MG/DL — LOW (ref 1.8–2.4)
MCHC RBC-ENTMCNC: 28.7 PG — SIGNIFICANT CHANGE UP (ref 27–31)
MCHC RBC-ENTMCNC: 31.7 G/DL — LOW (ref 32–37)
MCV RBC AUTO: 90.3 FL — SIGNIFICANT CHANGE UP (ref 81–99)
MONOCYTES # BLD AUTO: 0.78 K/UL — HIGH (ref 0.1–0.6)
MONOCYTES NFR BLD AUTO: 10.5 % — HIGH (ref 1.7–9.3)
NEUTROPHILS # BLD AUTO: 3.74 K/UL — SIGNIFICANT CHANGE UP (ref 1.4–6.5)
NEUTROPHILS NFR BLD AUTO: 50.2 % — SIGNIFICANT CHANGE UP (ref 42.2–75.2)
NRBC # BLD: 0 /100 WBCS — SIGNIFICANT CHANGE UP (ref 0–0)
PLATELET # BLD AUTO: 146 K/UL — SIGNIFICANT CHANGE UP (ref 130–400)
POTASSIUM SERPL-MCNC: 4.1 MMOL/L — SIGNIFICANT CHANGE UP (ref 3.5–5)
POTASSIUM SERPL-SCNC: 4.1 MMOL/L — SIGNIFICANT CHANGE UP (ref 3.5–5)
PROT SERPL-MCNC: 6.6 G/DL — SIGNIFICANT CHANGE UP (ref 6–8)
RBC # BLD: 5.06 M/UL — SIGNIFICANT CHANGE UP (ref 4.2–5.4)
RBC # FLD: 14 % — SIGNIFICANT CHANGE UP (ref 11.5–14.5)
SODIUM SERPL-SCNC: 133 MMOL/L — LOW (ref 135–146)
WBC # BLD: 7.44 K/UL — SIGNIFICANT CHANGE UP (ref 4.8–10.8)
WBC # FLD AUTO: 7.44 K/UL — SIGNIFICANT CHANGE UP (ref 4.8–10.8)

## 2022-03-25 PROCEDURE — 99222 1ST HOSP IP/OBS MODERATE 55: CPT

## 2022-03-25 PROCEDURE — 99233 SBSQ HOSP IP/OBS HIGH 50: CPT

## 2022-03-25 RX ORDER — ENOXAPARIN SODIUM 100 MG/ML
70 INJECTION SUBCUTANEOUS
Qty: 48 | Refills: 2
Start: 2022-03-25 | End: 2022-06-22

## 2022-03-25 RX ORDER — HYDRALAZINE HCL 50 MG
2 TABLET ORAL
Qty: 0 | Refills: 0 | DISCHARGE

## 2022-03-25 RX ORDER — MAGNESIUM SULFATE 500 MG/ML
2 VIAL (ML) INJECTION ONCE
Refills: 0 | Status: COMPLETED | OUTPATIENT
Start: 2022-03-25 | End: 2022-03-25

## 2022-03-25 RX ORDER — MORPHINE SULFATE 50 MG/1
1 CAPSULE, EXTENDED RELEASE ORAL
Qty: 0 | Refills: 0 | DISCHARGE

## 2022-03-25 RX ORDER — RIVAROXABAN 15 MG-20MG
1 KIT ORAL
Qty: 0 | Refills: 0 | DISCHARGE

## 2022-03-25 RX ADMIN — Medication 2 MILLIGRAM(S): at 06:33

## 2022-03-25 RX ADMIN — Medication 10 MILLIGRAM(S): at 05:08

## 2022-03-25 RX ADMIN — Medication 2 MILLIGRAM(S): at 14:19

## 2022-03-25 RX ADMIN — Medication 50 MILLIGRAM(S): at 17:40

## 2022-03-25 RX ADMIN — Medication 25 GRAM(S): at 12:22

## 2022-03-25 RX ADMIN — ENOXAPARIN SODIUM 70 MILLIGRAM(S): 100 INJECTION SUBCUTANEOUS at 17:40

## 2022-03-25 RX ADMIN — MORPHINE SULFATE 120 MILLIGRAM(S): 50 CAPSULE, EXTENDED RELEASE ORAL at 05:17

## 2022-03-25 RX ADMIN — Medication 81 MILLIGRAM(S): at 12:23

## 2022-03-25 RX ADMIN — MORPHINE SULFATE 120 MILLIGRAM(S): 50 CAPSULE, EXTENDED RELEASE ORAL at 22:04

## 2022-03-25 RX ADMIN — ENOXAPARIN SODIUM 70 MILLIGRAM(S): 100 INJECTION SUBCUTANEOUS at 05:51

## 2022-03-25 RX ADMIN — ALBUTEROL 4 PUFF(S): 90 AEROSOL, METERED ORAL at 08:18

## 2022-03-25 RX ADMIN — Medication 100 MILLIGRAM(S): at 05:07

## 2022-03-25 RX ADMIN — Medication 10 MILLIGRAM(S): at 12:23

## 2022-03-25 RX ADMIN — MORPHINE SULFATE 120 MILLIGRAM(S): 50 CAPSULE, EXTENDED RELEASE ORAL at 05:09

## 2022-03-25 RX ADMIN — MORPHINE SULFATE 120 MILLIGRAM(S): 50 CAPSULE, EXTENDED RELEASE ORAL at 13:40

## 2022-03-25 RX ADMIN — Medication 50 MILLIGRAM(S): at 05:08

## 2022-03-25 RX ADMIN — Medication 2 MILLIGRAM(S): at 23:16

## 2022-03-25 RX ADMIN — Medication 10 MILLIGRAM(S): at 22:04

## 2022-03-25 NOTE — DISCHARGE NOTE PROVIDER - NSDCMRMEDTOKEN_GEN_ALL_CORE_FT
albuterol 90 mcg/inh inhalation aerosol: 2 puff(s) inhaled every 6 hours  ALPRAZolam 1 mg oral tablet: 1 tab(s) orally 3 times a day, As needed, Anxiety  aspirin 81 mg oral delayed release tablet: 1 tab(s) orally once a day  Benlysta: 200 milligram(s) intravenous once a week  cyanocobalamin 1000 mcg/mL injectable solution: 1 milliliter(s) injectable once a week  folic acid 1 mg oral tablet: 1 tab(s) orally once a day  hydrALAZINE 50 mg oral tablet: 2 tab(s) orally once a day  Metoprolol Succinate  mg oral tablet, extended release: 1 tab(s) orally once a day  MS Contin 60 mg oral tablet, extended release: 1 tab(s) orally 3 times a day (after meals)  Reglan 10 mg oral tablet: 1 tab(s) orally 4 times a day (before meals and at bedtime)  Trelegy Ellipta 100 mcg-62.5 mcg-25 mcg/inh inhalation powder: 1 puff(s) inhaled once a day  Xarelto 20 mg oral tablet: 1 tab(s) orally once a day (in the evening)   albuterol 90 mcg/inh inhalation aerosol: 2 puff(s) inhaled every 6 hours  ALPRAZolam 1 mg oral tablet: 2 tab(s) orally 3 times a day, As Needed  aspirin 81 mg oral delayed release tablet: 1 tab(s) orally once a day  Benlysta: 200 milligram(s) intravenous once a week  cyanocobalamin 1000 mcg/mL injectable solution: 1 milliliter(s) injectable once a week  enoxaparin 80 mg/0.8 mL injectable solution: 70 milligram(s) subcutaneously 2 times a day   folic acid 1 mg oral tablet: 1 tab(s) orally once a day  hydrALAZINE 50 mg oral tablet: 1 tab(s) orally 2 times a day  Metoprolol Succinate  mg oral tablet, extended release: 1 tab(s) orally once a day  MS Contin 60 mg oral tablet, extended release: 2 tab(s) orally 3 times a day (after meals)  Reglan 10 mg oral tablet: 1 tab(s) orally 4 times a day (before meals and at bedtime)  Trelegy Ellipta 100 mcg-62.5 mcg-25 mcg/inh inhalation powder: 1 puff(s) inhaled once a day

## 2022-03-25 NOTE — DISCHARGE NOTE PROVIDER - PROVIDER TOKENS
PROVIDER:[TOKEN:[[id number]]],PROVIDER:[TOKEN:[[id number]]],PROVIDER:[TOKEN:[[id number]]]

## 2022-03-25 NOTE — DISCHARGE NOTE NURSING/CASE MANAGEMENT/SOCIAL WORK - NSDCPEFALRISK_GEN_ALL_CORE
For information on Fall & Injury Prevention, visit: [email or web address] OR  [email or web address] OR  https://www.cdc.gov/steadi/patient.html

## 2022-03-25 NOTE — DISCHARGE NOTE PROVIDER - CARE PROVIDER_API CALL
En Chaney  [facility]  [address] [place] [address]  Phone: [phone]  Fax: [phone]  Follow Up Time:     Buddy Sultana)  Hematology; Internal Medicine; Medical Oncology  [address] [place] [address]  Phone: [phone]  Fax: [phone]  Follow Up Time:     Waqas Guan)  Surgery; Vascular Surgery  [address] [place] [address]  Phone: [phone]  Fax: [phone]  Follow Up Time:

## 2022-03-25 NOTE — DISCHARGE NOTE PROVIDER - HOSPITAL COURSE
51 year old woman with PMH of lupus with lupus anticoagulant, rheumatoid arthritis, cervical cancer sp hysterectomy,  laryngeal cancer sp CT, RT, CHF s/p AICD+PPM, DVT/PE in 2015 on Xarelto, NSTEMI in 2015, subclavian stenosis s/p stent placement, HTN, HLD, PPM/AICD, who presents with LUE swelling and chest pain. patient with recent stent assessment s/p cath LUE 3/14,and follows with dr Guan. patient with worsening swelling and chest pain today prompting ed eval. denies fever, chills, SOB, back pain, numbness/weakness, syncope.    in the ED,pt's VS T(C): 36.9 (03-23-22 @ 23:57), Max: 37 (03-23-22 @ 18:03)  HR: 89 (03-23-22 @ 23:57) (89 - 117)  BP: 180/88 (03-23-22 @ 23:57) (141/88 - 180/88)  RR: 17 (03-23-22 @ 18:03) (17 - 17)  SpO2: 98% (03-23-22 @ 23:57) (97% - 98%), labs done were unremarkable.   < from: CT Chest w/ IV Cont (03.23.22 @ 20:10) >Metallic densities are noted in the inferior and superior right segmental pulmonary arteries. Findings are concerning for metallic emboli of indeterminate age. No right heart strain is noted. Findings may reflect phlebitis of the left axillary/subclavian vein. Left subclavian vein stent is noted. Unable to evaluate patency as the phase of contrast is predominantly arterial. pt  was started on Heparin drip. pt is admitted for workup/manageme  on the floor -> pt was evaluated by Vascular -> EP cs, no surgical intervention, Duplex left upper extremity -> Acute deep vein thrombosis of the left internal jugular, subclavian, axillary and brachial veins. EP evaluation -> no intervention at the moment, recommend switch Xarelto to Eliquis 5mg bid and Aspirin 162mg  but consult hem onc for AC. metallic densities are most likely retained atrial lead from previous AICD -> hem onc -> lovenox 1mg/kg sc BID   51 year old woman with PMH of lupus with lupus anticoagulant, rheumatoid arthritis, cervical cancer sp hysterectomy,  laryngeal cancer sp CT, RT, CHF s/p AICD+PPM, DVT/PE in 2015 on Xarelto, NSTEMI in 2015, subclavian stenosis s/p stent placement, HTN, HLD, PPM/AICD, who presents with LUE swelling and chest pain. patient with recent stent assessment s/p cath LUE 3/14,and follows with dr Guan. patient with worsening swelling and chest pain today prompting ed eval. denies fever, chills, SOB, back pain, numbness/weakness, syncope.    in the ED,pt's VS T(C): 36.9 (03-23-22 @ 23:57), Max: 37 (03-23-22 @ 18:03)  HR: 89 (03-23-22 @ 23:57) (89 - 117)  BP: 180/88 (03-23-22 @ 23:57) (141/88 - 180/88)  RR: 17 (03-23-22 @ 18:03) (17 - 17)  SpO2: 98% (03-23-22 @ 23:57) (97% - 98%), labs done were unremarkable.   < from: CT Chest w/ IV Cont (03.23.22 @ 20:10) >Metallic densities are noted in the inferior and superior right segmental pulmonary arteries. Findings are concerning for metallic emboli of indeterminate age. No right heart strain is noted. Findings may reflect phlebitis of the left axillary/subclavian vein. Left subclavian vein stent is noted. Unable to evaluate patency as the phase of contrast is predominantly arterial. pt  was started on Heparin drip. pt is admitted for workup/manageme  on the floor -> pt was evaluated by Vascular -> EP cs, no surgical intervention, Duplex left upper extremity -> Acute deep vein thrombosis of the left internal jugular, subclavian, axillary and brachial veins. EP evaluation -> no intervention at the moment, recommend switch Xarelto to Eliquis 5mg bid and Aspirin 162mg  but consult hem onc for AC. metallic densities are most likely retained atrial lead from previous AICD -> hem onc -> lovenox 1mg/kg sc BID  Attending Attestation:  Patient was seen & examined independently. At least 10 systems were reviewed in ROS. All systems reviewed  are within normal limits. Latest vital signs and labs were reviewed today. Case was discussed with house staff in morning rounds for assessment and plan.  Patient is medically stable for discharge . About 32 mins spent on discharge disposition.   51 year old woman with PMH of lupus with lupus anticoagulant, rheumatoid arthritis, cervical cancer sp hysterectomy,  laryngeal cancer sp CT, RT, CHF s/p AICD+PPM, DVT/PE in 2015 on Xarelto, NSTEMI in 2015, subclavian stenosis s/p stent placement, HTN, HLD, PPM/AICD, who presents with LUE swelling and chest pain. patient with recent stent assessment s/p cath LUE 3/14,and follows with dr Guan. patient with worsening swelling and chest pain today prompting ed eval. denies fever, chills, SOB, back pain, numbness/weakness, syncope.    in the ED,pt's VS T(C): 36.9 (03-23-22 @ 23:57), Max: 37 (03-23-22 @ 18:03)  HR: 89 (03-23-22 @ 23:57) (89 - 117)  BP: 180/88 (03-23-22 @ 23:57) (141/88 - 180/88)  RR: 17 (03-23-22 @ 18:03) (17 - 17)  SpO2: 98% (03-23-22 @ 23:57) (97% - 98%), labs done were unremarkable.   < from: CT Chest w/ IV Cont (03.23.22 @ 20:10) >Metallic densities are noted in the inferior and superior right segmental pulmonary arteries. Findings are concerning for metallic emboli of indeterminate age. No right heart strain is noted. Findings may reflect phlebitis of the left axillary/subclavian vein. Left subclavian vein stent is noted. Unable to evaluate patency as the phase of contrast is predominantly arterial. pt  was started on Heparin drip. pt is admitted for workup/manageme  on the floor -> pt was evaluated by Vascular -> EP cs, no surgical intervention, Duplex left upper extremity -> Acute deep vein thrombosis of the left internal jugular, subclavian, axillary and brachial veins. EP evaluation -> no intervention at the moment, recommend switch Xarelto to Eliquis 5mg bid and Aspirin 162mg  but consult hem onc for AC. metallic densities are most likely retained atrial lead from previous AICD -> hem onc -> lovenox 1mg/kg sc BID on discharge as per recommendation.  Attending Attestation:  Patient was seen & examined independently. At least 10 systems were reviewed in ROS. All systems reviewed  are within normal limits. Latest vital signs and labs were reviewed today. Case was discussed with house staff in morning rounds for assessment and plan.  Patient is medically stable for discharge . About 32 mins spent on discharge disposition.

## 2022-03-25 NOTE — CONSULT NOTE ADULT - CONSULT REASON
dvt at icd
Anticoagulation
worsening L arm pain and swelling s/p L arm venogram occluded subclavian stent, unable to open

## 2022-03-25 NOTE — PROGRESS NOTE ADULT - ASSESSMENT
51 year old woman with PMH of lupus with lupus anticoagulant, rheumatoid arthritis, cervical cancer sp hysterectomy,  laryngeal cancer sp CT, RT, CHF s/p AICD+PPM, DVT/PE in 2015 on Xarelto, NSTEMI in 2015, subclavian stenosis s/p stent placement, HTN, HLD, PPM/AICD, who presents with LUE swelling and chest pain. patient with recent stent assessment s/p cath LUE 3/14,and follows with dr Guan. patient with worsening swelling and chest pain today prompting ed eval. denies fever, chills, SOB, back pain, numbness/weakness, syncope.    #Right upper extremity edema  # Metallic densities are noted in the inferior and superior right segmental   pulmonary arteries.   # DVT of the left internal jugular, subclavian, axillary and brachial veins.   - Underwent LUE venous angiogram 3/18/22 for long standing arm swelling  - chronic indwelling LUE pacemaker and left axillosubclavian venous stent   - Stent is occluded on venogram and vx  unable to open it in an endovascular manner , per vascular no further surgical options we can offer her.  - CT Chest w/ IV Cont (03.23.22 @ 20:10) >Metallic densities are noted in the inferior and superior right segmental pulmonary arteries. Findings are concerning for metallic emboli of indeterminate age. No right heart strain is noted  - pt was evaluated by Vascular -> EP cs, no surgical intervention, Duplex left upper extremity -> Acute deep vein thrombosis of the left internal jugular, subclavian, axillary and brachial veins.   - Found to have axillary & subclavian DVTs despite being on Xarelto   - EP evaluation for possible removal/relocating pacemaker & anticoagulation for her DVTs -> no intervention at the moment, recommend switch Xarelto to Eliquis 5mg bid and Aspirin 162mg  but consult hem onc for AC. metallic densities are most likely retained atrial lead from previous AICD  - Arm elevation, cold compresses  - pt  was started on Heparin drip -> Lovenox therapeutic 03/24  - Hem/onc consult for AC    #History of long-QT syndrome and VT sp AICD  #HFpEF  - EF 63% 2019  - EP on board  - s/p AICD placement (s/p laser lead extraction of ICD leads and device and reimplanted with new RA/RV lead and new ICD device (Medtronic) in 2021)      #History of VTE on Xarelto  - on xarelto at home, on heparin -> lovenox therapeutic 03/24    #SLE  - not in flare  - On Xarelto, on hold for now.      #RA  - Outpatient f/u      #COPD  - not in exacerbation  - Continue inhalers and nebs for COPD      #MISC  DVT ppx: lovenox therapeutic  GI PPX not needed  DIET soft   Code status: full code  
51 year old woman with PMH of lupus with lupus anticoagulant, rheumatoid arthritis, cervical cancer sp hysterectomy,  laryngeal cancer sp CT, RT, CHF s/p AICD+PPM, DVT/PE in 2015 on Xarelto, NSTEMI in 2015, subclavian stenosis s/p stent placement, HTN, HLD, PPM/AICD, who presents with LUE swelling and chest pain. patient with recent stent assessment s/p cath LUE 3/14,and follows with dr Guan. patient with worsening swelling and chest pain today prompting ed eval. denies fever, chills, SOB, back pain, numbness/weakness, syncope.    #Right upper extremity edema  # Metallic densities are noted in the inferior and superior right segmental   pulmonary arteries.   # DVT of the left internal jugular, subclavian, axillary and brachial veins.   - Underwent LUE venous angiogram 3/18/22 for long standing arm swelling  - chronic indwelling LUE pacemaker and left axillosubclavian venous stent   - Stent is occluded on venogram and vx  unable to open it in an endovascular manner , per vascular no further surgical options we can offer her.  - CT Chest w/ IV Cont (03.23.22 @ 20:10) >Metallic densities are noted in the inferior and superior right segmental pulmonary arteries. Findings are concerning for metallic emboli of indeterminate age. No right heart strain is noted  - pt was evaluated by Vascular -> EP cs, no surgical intervention, Duplex left upper extremity -> Acute deep vein thrombosis of the left internal jugular, subclavian, axillary and brachial veins.   - Found to have axillary & subclavian DVTs despite being on Xarelto   - EP evaluation for possible removal/relocating pacemaker & anticoagulation for her DVTs -> no intervention at the moment, recommend switch Xarelto to Eliquis 5mg bid and Aspirin 162mg  but consult hem onc for AC. metallic densities are most likely retained atrial lead from previous AICD  - Arm elevation, cold compresses  - pt  was started on Heparin drip -> Lovenox therapeutic 03/24  - f/u Hem/onc consult for AC (Dr monzon)    #History of long-QT syndrome and VT sp AICD  #HFpEF  - EF 63% 2019  - EP on board  - s/p AICD placement (s/p laser lead extraction of ICD leads and device and reimplanted with new RA/RV lead and new ICD device (Medtronic) in 2021)      #History of VTE on Xarelto  - on xarelto at home, on heparin -> lovenox therapeutic 03/24    #SLE  - not in flare  - On Xarelto, on hold for now.      #RA  - Outpatient f/u      #COPD  - not in exacerbation  - Continue inhalers and nebs for COPD      #MISC  DVT ppx: lovenox therapeutic  GI PPX not needed  DIET soft   Code status: full code

## 2022-03-25 NOTE — CONSULT NOTE ADULT - SUBJECTIVE AND OBJECTIVE BOX
Patient is a 51y old  Female who presents with a chief complaint of worsening left arm swelling.    HPI:  51 year old woman with PMH of lupus with lupus anticoagulant, rheumatoid arthritis, cervical cancer sp hysterectomy,  laryngeal cancer sp CT, RT, CHF s/p AICD+PPM, DVT/PE in 2015 on Xarelto, NSTEMI in 2015, subclavian stenosis s/p stent placement, HTN, HLD, who presents with worsening LUE swelling and chest pain. The patient with recent stent assessment s/p cath LUE 3/14,and follows with Dr. Guan. Patient presented with worsening swelling and chest pain on 3/23 prompting ED eval. Denies fever, chills, SOB, back pain, numbness/weakness, syncope.    In the ED,pt's VS T(C): 36.9 (03-23-22 @ 23:57), Max: 37 (03-23-22 @ 18:03)  HR: 89 (03-23-22 @ 23:57) (89 - 117)  BP: 180/88 (03-23-22 @ 23:57) (141/88 - 180/88)  RR: 17 (03-23-22 @ 18:03) (17 - 17)  SpO2: 98% (03-23-22 @ 23:57) (97% - 98%), labs done were unremarkable.   < from: CT Chest w/ IV Cont (03.23.22 @ 20:10) >  Metallic densities are noted in the inferior and superior right segmental   pulmonary arteries. Findings are concerning for metallic emboli of   indeterminate age. No right heart strain is noted.  Findings may reflect phlebitis of the left axillary/subclavian vein.  Left subclavian vein stent is noted. Unable to evaluate patency as the   phase of contrast is predominantly arterial.  pt  was started on Heparin drip.   pt is admitted for workup/management (23 Mar 2022 23:58)       ROS:  Negative except for:    PAST MEDICAL & SURGICAL HISTORY:  Lupus    RA (rheumatoid arthritis)    HTN (hypertension)    CHF (congestive heart failure)    COPD (chronic obstructive pulmonary disease)    DVT (deep venous thrombosis)    Pulmonary embolism    History of laryngeal cancer    GERD (gastroesophageal reflux disease)    Cervical cancer    S/P appendectomy    S/P cholecystectomy    AICD (automatic cardioverter/defibrillator) present    S/P hysterectomy    History of back surgery    H/O foot surgery    S/P eye surgery    Subclavian arterial stenosis        SOCIAL HISTORY:   Previous smoker quit in 2003.   FAMILY HISTORY:   thyroid cancer, breast cancer, and cervical cancer     MEDICATIONS  (STANDING):  ALBUTerol  90 MICROgram(s) HFA Inhaler - Peds 4 Puff(s) Inhalation every 4 hours  ALPRAZolam 2 milliGRAM(s) Oral <User Schedule>  aspirin enteric coated 81 milliGRAM(s) Oral daily  budesonide  80 MICROgram(s)/formoterol 4.5 MICROgram(s) Inhaler 2 Puff(s) Inhalation two times a day  enoxaparin Injectable 70 milliGRAM(s) SubCutaneous every 12 hours  hydrALAZINE 50 milliGRAM(s) Oral two times a day  magnesium sulfate  IVPB 2 Gram(s) IV Intermittent once  metoclopramide 10 milliGRAM(s) Oral Before meals and at bedtime  metoprolol succinate  milliGRAM(s) Oral daily  morphine ER Tablet 120 milliGRAM(s) Oral <User Schedule>  tiotropium 18 MICROgram(s) Capsule 1 Capsule(s) Inhalation daily    MEDICATIONS  (PRN):  acetaminophen     Tablet .. 650 milliGRAM(s) Oral every 6 hours PRN Temp greater or equal to 38C (100.4F), Mild Pain (1 - 3)  aluminum hydroxide/magnesium hydroxide/simethicone Suspension 30 milliLiter(s) Oral every 4 hours PRN Dyspepsia  melatonin 3 milliGRAM(s) Oral at bedtime PRN Insomnia  ondansetron Injectable 4 milliGRAM(s) IV Push every 8 hours PRN Nausea and/or Vomiting    Allergies    adhesives (Rash; Urticaria)  Arava (Anaphylaxis; Angioedema)  Avelox (Other)  Plaquenil (Other)  Vantin (Other (Mild))    Intolerances        Vital Signs Last 24 Hrs  T(C): 36.8 (25 Mar 2022 07:29), Max: 37.1 (24 Mar 2022 23:40)  T(F): 98.3 (25 Mar 2022 07:29), Max: 98.7 (24 Mar 2022 23:40)  HR: 73 (25 Mar 2022 07:29) (70 - 79)  BP: 161/70 (25 Mar 2022 07:29) (140/73 - 163/79)  BP(mean): --  RR: 19 (25 Mar 2022 07:29) (18 - 19)  SpO2: --    PHYSICAL EXAM  General: adult in NAD  HEENT: clear oropharynx, anicteric sclera, pink conjunctiva  Neck: supple  CV: normal S1/S2 with no murmur rubs or gallops  Lungs: positive air movement b/l ant lungs,clear to auscultation, no wheezes, no rales  Abdomen: soft non-tender non-distended, no hepatosplenomegaly  Ext: no clubbing cyanosis or edema  Skin: no rashes and no petechiae  Neuro: alert and oriented X 4, no focal deficits      LABS:                          14.5   7.44  )-----------( 146      ( 25 Mar 2022 08:31 )             45.7         Mean Cell Volume : 90.3 fL  Mean Cell Hemoglobin : 28.7 pg  Mean Cell Hemoglobin Concentration : 31.7 g/dL  Auto Neutrophil # : 3.74 K/uL  Auto Lymphocyte # : 2.62 K/uL  Auto Monocyte # : 0.78 K/uL  Auto Eosinophil # : 0.22 K/uL  Auto Basophil # : 0.05 K/uL  Auto Neutrophil % : 50.2 %  Auto Lymphocyte % : 35.2 %  Auto Monocyte % : 10.5 %  Auto Eosinophil % : 3.0 %  Auto Basophil % : 0.7 %      Serial CBC's  03-25 @ 08:31  Hct-45.7 / Hgb-14.5 / Plat-146 / RBC-5.06 / WBC-7.44  Serial CBC's  03-24 @ 04:30  Hct-42.9 / Hgb-14.2 / Plat-178 / RBC-4.85 / WBC-9.60  Serial CBC's  03-23 @ 20:01  Hct-43.5 / Hgb-14.4 / Plat-143 / RBC-4.91 / WBC-8.13      03-25    133<L>  |  97<L>  |  11  ----------------------------<  96  4.1   |  26  |  0.8    Ca    9.6      25 Mar 2022 08:31  Mg     1.7     03-25    TPro  6.6  /  Alb  4.5  /  TBili  0.5  /  DBili  x   /  AST  19  /  ALT  9   /  AlkPhos  69  03-25      PTT - ( 25 Mar 2022 08:31 )  PTT:57.9 sec      BLOOD SMEAR INTERPRETATION:       RADIOLOGY & ADDITIONAL STUDIES:    DUPLEX EXT VEINS UPPER LT  Impression:  Acute deep vein thrombosis of the left internal jugular, subclavian,   axillary and brachial veins.  Superficial thrombophlebitis of the left basilic vein.    DUPLEX UP EXT UNI LTD LT                          Impression:    Normal arterial flow in left upper extremity.     CT CHEST IC    IMPRESSION:    Metallic densities are noted in the inferior and superior right segmental   pulmonary arteries. Findings are concerning for metallic emboli of   indeterminate age. No right heart strain is noted.    Findings may reflect phlebitis of the left axillary/subclavian vein.    Left subclavian vein stent is noted. Unable to evaluate patency as the   phase of contrast is predominantly arterial.     Patient is a 51y old  Female who presents with a chief complaint of worsening left arm swelling.    HPI:  51 year old woman with PMH of lupus with lupus anticoagulant, rheumatoid arthritis, cervical cancer sp hysterectomy,  laryngeal cancer sp CT, RT, CHF s/p AICD+PPM, DVT/PE in 2015 on Xarelto, NSTEMI in 2015, subclavian stenosis s/p stent placement, HTN, HLD, who presents with worsening LUE swelling and chest pain. The patient with recent stent assessment s/p cath LUE 3/14,and follows with Dr. Guan. Patient presented with worsening swelling and chest pain on 3/23 prompting ED eval. Denies fever, chills, SOB, back pain, numbness/weakness, syncope.    In the ED,pt's VS T(C): 36.9 (03-23-22 @ 23:57), Max: 37 (03-23-22 @ 18:03)  HR: 89 (03-23-22 @ 23:57) (89 - 117)  BP: 180/88 (03-23-22 @ 23:57) (141/88 - 180/88)  RR: 17 (03-23-22 @ 18:03) (17 - 17)  SpO2: 98% (03-23-22 @ 23:57) (97% - 98%), labs done were unremarkable.   < from: CT Chest w/ IV Cont (03.23.22 @ 20:10) >  Metallic densities are noted in the inferior and superior right segmental   pulmonary arteries. Findings are concerning for metallic emboli of   indeterminate age. No right heart strain is noted.  Findings may reflect phlebitis of the left axillary/subclavian vein.  Left subclavian vein stent is noted. Unable to evaluate patency as the   phase of contrast is predominantly arterial.  pt  was started on Heparin drip.   pt is admitted for workup/management (23 Mar 2022 23:58)     PAST MEDICAL & SURGICAL HISTORY:  Lupus  RA (rheumatoid arthritis)  HTN (hypertension)  CHF (congestive heart failure)  COPD (chronic obstructive pulmonary disease)  DVT (deep venous thrombosis)  Pulmonary embolism  History of laryngeal cancer  GERD (gastroesophageal reflux disease)  Cervical cancer  S/P appendectomy  S/P cholecystectomy  AICD (automatic cardioverter/defibrillator) present  S/P hysterectomy  History of back surgery  H/O foot surgery  S/P eye surgery  Subclavian arterial stenosis    SOCIAL HISTORY:   Previous smoker quit in 2003.   FAMILY HISTORY:   thyroid cancer, breast cancer, and cervical cancer     MEDICATIONS  (STANDING):  ALBUTerol  90 MICROgram(s) HFA Inhaler - Peds 4 Puff(s) Inhalation every 4 hours  ALPRAZolam 2 milliGRAM(s) Oral <User Schedule>  aspirin enteric coated 81 milliGRAM(s) Oral daily  budesonide  80 MICROgram(s)/formoterol 4.5 MICROgram(s) Inhaler 2 Puff(s) Inhalation two times a day  enoxaparin Injectable 70 milliGRAM(s) SubCutaneous every 12 hours  hydrALAZINE 50 milliGRAM(s) Oral two times a day  magnesium sulfate  IVPB 2 Gram(s) IV Intermittent once  metoclopramide 10 milliGRAM(s) Oral Before meals and at bedtime  metoprolol succinate  milliGRAM(s) Oral daily  morphine ER Tablet 120 milliGRAM(s) Oral <User Schedule>  tiotropium 18 MICROgram(s) Capsule 1 Capsule(s) Inhalation daily    MEDICATIONS  (PRN):  acetaminophen     Tablet .. 650 milliGRAM(s) Oral every 6 hours PRN Temp greater or equal to 38C (100.4F), Mild Pain (1 - 3)  aluminum hydroxide/magnesium hydroxide/simethicone Suspension 30 milliLiter(s) Oral every 4 hours PRN Dyspepsia  melatonin 3 milliGRAM(s) Oral at bedtime PRN Insomnia  ondansetron Injectable 4 milliGRAM(s) IV Push every 8 hours PRN Nausea and/or Vomiting    Allergies    adhesives (Rash; Urticaria)  Arava (Anaphylaxis; Angioedema)  Avelox (Other)  Plaquenil (Other)  Vantin (Other (Mild))    Intolerances        Vital Signs Last 24 Hrs  T(C): 36.8 (25 Mar 2022 07:29), Max: 37.1 (24 Mar 2022 23:40)  T(F): 98.3 (25 Mar 2022 07:29), Max: 98.7 (24 Mar 2022 23:40)  HR: 73 (25 Mar 2022 07:29) (70 - 79)  BP: 161/70 (25 Mar 2022 07:29) (140/73 - 163/79)  BP(mean): --  RR: 19 (25 Mar 2022 07:29) (18 - 19)  SpO2: --    PHYSICAL EXAM  General: adult in NAD  HEENT: clear oropharynx, anicteric sclera, pink conjunctiva  Neck: supple  CV: normal S1/S2 with no murmur rubs or gallops  Lungs: positive air movement b/l ant lungs,clear to auscultation, no wheezes, no rales  Abdomen: soft non-tender non-distended, no hepatosplenomegaly  Ext: no clubbing cyanosis or edema  Skin: no rashes and no petechiae  Neuro: alert and oriented X 4, no focal deficits      LABS:                          14.5   7.44  )-----------( 146      ( 25 Mar 2022 08:31 )             45.7         Mean Cell Volume : 90.3 fL  Mean Cell Hemoglobin : 28.7 pg  Mean Cell Hemoglobin Concentration : 31.7 g/dL  Auto Neutrophil # : 3.74 K/uL  Auto Lymphocyte # : 2.62 K/uL  Auto Monocyte # : 0.78 K/uL  Auto Eosinophil # : 0.22 K/uL  Auto Basophil # : 0.05 K/uL  Auto Neutrophil % : 50.2 %  Auto Lymphocyte % : 35.2 %  Auto Monocyte % : 10.5 %  Auto Eosinophil % : 3.0 %  Auto Basophil % : 0.7 %      Serial CBC's  03-25 @ 08:31  Hct-45.7 / Hgb-14.5 / Plat-146 / RBC-5.06 / WBC-7.44  Serial CBC's  03-24 @ 04:30  Hct-42.9 / Hgb-14.2 / Plat-178 / RBC-4.85 / WBC-9.60  Serial CBC's  03-23 @ 20:01  Hct-43.5 / Hgb-14.4 / Plat-143 / RBC-4.91 / WBC-8.13      03-25    133<L>  |  97<L>  |  11  ----------------------------<  96  4.1   |  26  |  0.8    Ca    9.6      25 Mar 2022 08:31  Mg     1.7     03-25    TPro  6.6  /  Alb  4.5  /  TBili  0.5  /  DBili  x   /  AST  19  /  ALT  9   /  AlkPhos  69  03-25      PTT - ( 25 Mar 2022 08:31 )  PTT:57.9 sec      BLOOD SMEAR INTERPRETATION:       RADIOLOGY & ADDITIONAL STUDIES:    DUPLEX EXT VEINS UPPER LT  Impression:  Acute deep vein thrombosis of the left internal jugular, subclavian,   axillary and brachial veins.  Superficial thrombophlebitis of the left basilic vein.    DUPLEX UP EXT UNI LTD LT                          Impression:    Normal arterial flow in left upper extremity.     CT CHEST IC    IMPRESSION:    Metallic densities are noted in the inferior and superior right segmental   pulmonary arteries. Findings are concerning for metallic emboli of   indeterminate age. No right heart strain is noted.    Findings may reflect phlebitis of the left axillary/subclavian vein.    Left subclavian vein stent is noted. Unable to evaluate patency as the   phase of contrast is predominantly arterial.

## 2022-03-25 NOTE — DISCHARGE NOTE NURSING/CASE MANAGEMENT/SOCIAL WORK - PATIENT PORTAL LINK FT
You can access the FollowMyHealth Patient Portal offered by [facility] by registering at the following website: [email or web address]. By joining [employer] [facility]’s FollowMyHealth portal, you will also be able to view your health information using other applications (apps) compatible with our system.

## 2022-03-25 NOTE — CONSULT NOTE ADULT - ASSESSMENT
51 year old woman with PMH of lupus with lupus anticoagulant, rheumatoid arthritis, cervical cancer sp hysterectomy,  laryngeal cancer sp CT, RT, CHF s/p AICD+PPM, DVT/PE in 2015 on Xarelto, NSTEMI in 2015, subclavian stenosis s/p stent placement, HTN, HLD, who presents with worsening LUE swelling and chest pain. The patient with recent stent assessment s/p cath LUE 3/14,and follows with Dr. Guan. Patient presented with worsening swelling and chest pain on 3/23 prompting ED eval. On admission, Duplex of LUE had positive findings of an acute deep vein thrombosis of the left internal jugular, subclavian axillary and brachial veins. Heme/Onc consulted for anticoagulation recommendation.     #DVT of the left internal jugular, subclavian, axillary and brachial veins    - from most likely due to ineffective anticoagulation, previous DVT and PE history while on Xarelto and failed coumadin trial.    - DDx include but less likely contributed to Lupus, oncological dx  -   - follow up outpatient with Heme/Onc      51 year old woman with PMH of lupus with lupus anticoagulant, rheumatoid arthritis, cervical cancer sp hysterectomy,  laryngeal cancer sp CT, RT, CHF s/p AICD+PPM, DVT/PE in 2015 on Xarelto, NSTEMI in 2015, subclavian stenosis s/p stent placement, HTN, HLD, who presents with worsening LUE swelling and chest pain. The patient with recent stent assessment s/p cath LUE 3/14,and follows with Dr. Guan. Patient presented with worsening swelling and chest pain on 3/23 prompting ED eval. On admission, Duplex of LUE had positive findings of an acute deep vein thrombosis of the left internal jugular, subclavian axillary and brachial veins. Heme/Onc consulted for anticoagulation recommendation.     #DVT of the left internal jugular, subclavian, axillary and brachial veins    - Most likely due to ineffective anticoagulation. Failed Xarelto and Coumadin trials.   - DDx include but less likely contributed to Lupus, or malignancy   - Start Lovenox 1 mg/kg BID   - follow up outpatient with patients Heme/Onc specialist       51 year old woman with PMH of lupus with lupus anticoagulant, rheumatoid arthritis, cervical cancer sp hysterectomy,  laryngeal cancer sp CT, RT, CHF s/p AICD+PPM, DVT/PE in 2015 on Xarelto, NSTEMI in 2015, subclavian stenosis s/p stent placement, HTN, HLD, who presents with worsening LUE swelling and chest pain. The patient with recent stent assessment s/p cath LUE 3/14,and follows with Dr. Guan. Patient presented with worsening swelling and chest pain on 3/23 prompting ED eval. On admission, Duplex of LUE had positive findings of an acute deep vein thrombosis of the left internal jugular, subclavian axillary and brachial veins. Heme/Onc consulted for anticoagulation recommendation.     # Recurrent DVTs, now presented with left internal jugular, subclavian, axillary and brachial veins    # Hx of APS, on Xarelto   - Pt was previously on coumadin. Due to difficulty maintaining on coumadin and she also developed clots while coumadin, pt was switched to Xaretlo  - now with recurrent DVTs while on Xarelto, next option will be therapeutic lovenox 1 mg/kg BID  - c/w Lovenox 1 mg/kg BID and discharge pt home with the same (please educate pt to inject)  - f/u with Dr. Sultana as outpatient

## 2022-03-25 NOTE — DISCHARGE NOTE PROVIDER - NSDCFUADDAPPT_GEN_ALL_CORE_FT
Patient needs to follow up with hematology for further continuation of anticoagulation . She will be supplied 2 weeks of Lovenox on discharge.

## 2022-03-25 NOTE — DISCHARGE NOTE PROVIDER - CARE PROVIDERS DIRECT ADDRESSES
,DirectAddress_Unknown,[email or web address]

## 2022-03-25 NOTE — DISCHARGE NOTE PROVIDER - NSDCCPCAREPLAN_GEN_ALL_CORE_FT
PRINCIPAL DISCHARGE DIAGNOSIS  Diagnosis: Venous embolism and thrombosis of subclavian vein, left  Assessment and Plan of Treatment: you presented with worsening swelling and chest pain, in ED CT Chest w/ IV Cont done and showed >Metallic densities are noted in the inferior and superior right segmental pulmonary arteries and possiblephlebitis of the left axillary/subclavian vein. Left subclavian vein stent is noted. you were started on IV anticoagulation (Heparin drip).  on the floor -> you were evaluated by Vascular -> recommended cardiac electrophysiology (EP) consultation, no surgical intervention, and Duplex left upper extremity which showed -> Acute deep vein thrombosis of the left internal jugular, subclavian, axillary and brachial veins. EP evaluation -> no intervention at the moment, and recommended consult hem onc for AntiCoagulation and that metallic densities are most likely retained atrial lead from previous AICD -> hem onc were consulted and they recommended-> lovenox subcutaneous injections. you should follow up with your PCP, vascular surgeon and hemonc.

## 2022-03-26 VITALS
DIASTOLIC BLOOD PRESSURE: 65 MMHG | HEART RATE: 70 BPM | SYSTOLIC BLOOD PRESSURE: 145 MMHG | RESPIRATION RATE: 17 BRPM | OXYGEN SATURATION: 99 % | TEMPERATURE: 97 F

## 2022-03-26 LAB
ANION GAP SERPL CALC-SCNC: 11 MMOL/L — SIGNIFICANT CHANGE UP (ref 7–14)
APTT BLD: 80.4 SEC — CRITICAL HIGH (ref 27–39.2)
BASOPHILS # BLD AUTO: 0.07 K/UL — SIGNIFICANT CHANGE UP (ref 0–0.2)
BASOPHILS NFR BLD AUTO: 0.7 % — SIGNIFICANT CHANGE UP (ref 0–1)
BUN SERPL-MCNC: 11 MG/DL — SIGNIFICANT CHANGE UP (ref 10–20)
CALCIUM SERPL-MCNC: 9.8 MG/DL — SIGNIFICANT CHANGE UP (ref 8.5–10.1)
CHLORIDE SERPL-SCNC: 99 MMOL/L — SIGNIFICANT CHANGE UP (ref 98–110)
CO2 SERPL-SCNC: 26 MMOL/L — SIGNIFICANT CHANGE UP (ref 17–32)
CREAT SERPL-MCNC: 0.8 MG/DL — SIGNIFICANT CHANGE UP (ref 0.7–1.5)
EGFR: 89 ML/MIN/1.73M2 — SIGNIFICANT CHANGE UP
EOSINOPHIL # BLD AUTO: 0.3 K/UL — SIGNIFICANT CHANGE UP (ref 0–0.7)
EOSINOPHIL NFR BLD AUTO: 2.9 % — SIGNIFICANT CHANGE UP (ref 0–8)
GLUCOSE SERPL-MCNC: 103 MG/DL — HIGH (ref 70–99)
HCT VFR BLD CALC: 45.6 % — SIGNIFICANT CHANGE UP (ref 37–47)
HGB BLD-MCNC: 15 G/DL — SIGNIFICANT CHANGE UP (ref 12–16)
IMM GRANULOCYTES NFR BLD AUTO: 0.4 % — HIGH (ref 0.1–0.3)
INR BLD: 1.04 RATIO — SIGNIFICANT CHANGE UP (ref 0.65–1.3)
LYMPHOCYTES # BLD AUTO: 3.81 K/UL — HIGH (ref 1.2–3.4)
LYMPHOCYTES # BLD AUTO: 36.8 % — SIGNIFICANT CHANGE UP (ref 20.5–51.1)
MCHC RBC-ENTMCNC: 29.4 PG — SIGNIFICANT CHANGE UP (ref 27–31)
MCHC RBC-ENTMCNC: 32.9 G/DL — SIGNIFICANT CHANGE UP (ref 32–37)
MCV RBC AUTO: 89.2 FL — SIGNIFICANT CHANGE UP (ref 81–99)
MONOCYTES # BLD AUTO: 1.09 K/UL — HIGH (ref 0.1–0.6)
MONOCYTES NFR BLD AUTO: 10.5 % — HIGH (ref 1.7–9.3)
NEUTROPHILS # BLD AUTO: 5.03 K/UL — SIGNIFICANT CHANGE UP (ref 1.4–6.5)
NEUTROPHILS NFR BLD AUTO: 48.7 % — SIGNIFICANT CHANGE UP (ref 42.2–75.2)
NRBC # BLD: 0 /100 WBCS — SIGNIFICANT CHANGE UP (ref 0–0)
PLATELET # BLD AUTO: 214 K/UL — SIGNIFICANT CHANGE UP (ref 130–400)
POTASSIUM SERPL-MCNC: 4.6 MMOL/L — SIGNIFICANT CHANGE UP (ref 3.5–5)
POTASSIUM SERPL-SCNC: 4.6 MMOL/L — SIGNIFICANT CHANGE UP (ref 3.5–5)
PROTHROM AB SERPL-ACNC: 12 SEC — SIGNIFICANT CHANGE UP (ref 9.95–12.87)
RBC # BLD: 5.11 M/UL — SIGNIFICANT CHANGE UP (ref 4.2–5.4)
RBC # FLD: 13.9 % — SIGNIFICANT CHANGE UP (ref 11.5–14.5)
SODIUM SERPL-SCNC: 136 MMOL/L — SIGNIFICANT CHANGE UP (ref 135–146)
WBC # BLD: 10.34 K/UL — SIGNIFICANT CHANGE UP (ref 4.8–10.8)
WBC # FLD AUTO: 10.34 K/UL — SIGNIFICANT CHANGE UP (ref 4.8–10.8)

## 2022-03-26 PROCEDURE — 99239 HOSP IP/OBS DSCHRG MGMT >30: CPT

## 2022-03-26 RX ADMIN — MORPHINE SULFATE 120 MILLIGRAM(S): 50 CAPSULE, EXTENDED RELEASE ORAL at 08:50

## 2022-03-26 RX ADMIN — Medication 2 MILLIGRAM(S): at 09:01

## 2022-03-26 RX ADMIN — ALBUTEROL 4 PUFF(S): 90 AEROSOL, METERED ORAL at 08:13

## 2022-03-26 RX ADMIN — MORPHINE SULFATE 120 MILLIGRAM(S): 50 CAPSULE, EXTENDED RELEASE ORAL at 05:19

## 2022-03-26 RX ADMIN — ENOXAPARIN SODIUM 70 MILLIGRAM(S): 100 INJECTION SUBCUTANEOUS at 05:16

## 2022-03-26 RX ADMIN — Medication 50 MILLIGRAM(S): at 05:17

## 2022-03-26 RX ADMIN — Medication 100 MILLIGRAM(S): at 05:17

## 2022-03-26 RX ADMIN — Medication 10 MILLIGRAM(S): at 09:01

## 2022-03-26 NOTE — PROGRESS NOTE ADULT - SUBJECTIVE AND OBJECTIVE BOX
ILEANA MCNEIL 51y Female  MRN#: [id number]     Hospital Day: 2d    Pt is currently admitted with the primary diagnosis of acute DVT of left internal jugular, subclavian, axillary and brachial veins.    SUBJECTIVE    No Overnight events     No Subjective complaints     Present Today:   - Cayden:  No                                             ----------------------------------------------------------  OBJECTIVE  PAST MEDICAL & SURGICAL HISTORY  Lupus    RA (rheumatoid arthritis)    HTN (hypertension)    CHF (congestive heart failure)    COPD (chronic obstructive pulmonary disease)    DVT (deep venous thrombosis)    Pulmonary embolism    History of laryngeal cancer    GERD (gastroesophageal reflux disease)    Cervical cancer    S/P appendectomy    S/P cholecystectomy    AICD (automatic cardioverter/defibrillator) present    S/P hysterectomy    History of back surgery    H/O foot surgery    S/P eye surgery    Subclavian arterial stenosis                                              -----------------------------------------------------------  ALLERGIES:  adhesives (Rash; Urticaria)  Arava (Anaphylaxis; Angioedema)  Avelox (Other)  Plaquenil (Other)  Vantin (Other (Mild))                                            ------------------------------------------------------------    HOME MEDICATIONS  Home Medications:  albuterol 90 mcg/inh inhalation aerosol: 2 puff(s) inhaled every 6 hours (24 Mar 2022 00:49)  ALPRAZolam 1 mg oral tablet: 1 tab(s) orally 3 times a day, As needed, Anxiety (24 Mar 2022 00:49)  Benlysta: 200 milligram(s) intravenous once a week (24 Mar 2022 00:49)  cyanocobalamin 1000 mcg/mL injectable solution: 1 milliliter(s) injectable once a week (24 Mar 2022 00:49)  folic acid 1 mg oral tablet: 1 tab(s) orally once a day (24 Mar 2022 00:49)  hydrALAZINE 50 mg oral tablet: 2 tab(s) orally once a day (24 Mar 2022 00:49)  Metoprolol Succinate  mg oral tablet, extended release: 1 tab(s) orally once a day (24 Mar 2022 00:49)  MS Contin 60 mg oral tablet, extended release: 1 tab(s) orally 3 times a day (after meals) (24 Mar 2022 00:49)  Reglan 10 mg oral tablet: 1 tab(s) orally 4 times a day (before meals and at bedtime) (24 Mar 2022 00:49)  Trelegy Ellipta 100 mcg-62.5 mcg-25 mcg/inh inhalation powder: 1 puff(s) inhaled once a day (24 Mar 2022 00:49)  Xarelto 20 mg oral tablet: 1 tab(s) orally once a day (in the evening) (24 Mar 2022 00:49)                           MEDICATIONS:  STANDING MEDICATIONS  ALBUTerol  90 MICROgram(s) HFA Inhaler - Peds 4 Puff(s) Inhalation every 4 hours  ALPRAZolam 2 milliGRAM(s) Oral <User Schedule>  aspirin enteric coated 81 milliGRAM(s) Oral daily  budesonide  80 MICROgram(s)/formoterol 4.5 MICROgram(s) Inhaler 2 Puff(s) Inhalation two times a day  enoxaparin Injectable 70 milliGRAM(s) SubCutaneous every 12 hours  hydrALAZINE 50 milliGRAM(s) Oral two times a day  metoclopramide 10 milliGRAM(s) Oral Before meals and at bedtime  metoprolol succinate  milliGRAM(s) Oral daily  morphine ER Tablet 120 milliGRAM(s) Oral <User Schedule>  tiotropium 18 MICROgram(s) Capsule 1 Capsule(s) Inhalation daily    PRN MEDICATIONS  acetaminophen     Tablet .. 650 milliGRAM(s) Oral every 6 hours PRN  aluminum hydroxide/magnesium hydroxide/simethicone Suspension 30 milliLiter(s) Oral every 4 hours PRN  melatonin 3 milliGRAM(s) Oral at bedtime PRN  ondansetron Injectable 4 milliGRAM(s) IV Push every 8 hours PRN                                            ------------------------------------------------------------  VITAL SIGNS: Last 24 Hours  T(C): 36.8 (25 Mar 2022 07:29), Max: 37.1 (24 Mar 2022 23:40)  T(F): 98.3 (25 Mar 2022 07:29), Max: 98.7 (24 Mar 2022 23:40)  HR: 73 (25 Mar 2022 07:29) (70 - 79)  BP: 161/70 (25 Mar 2022 07:29) (140/73 - 163/79)  BP(mean): --  RR: 19 (25 Mar 2022 07:29) (18 - 19)  SpO2: --      03-24-22 @ 07:01  -  03-25-22 @ 07:00  --------------------------------------------------------  IN: 240 mL / OUT: 0 mL / NET: 240 mL                                             --------------------------------------------------------------  LABS:                        14.5   7.44  )-----------( 146      ( 25 Mar 2022 08:31 )             45.7     03-25    133<L>  |  97<L>  |  11  ----------------------------<  96  4.1   |  26  |  0.8    Ca    9.6      25 Mar 2022 08:31  Mg     1.7     03-25    TPro  6.6  /  Alb  4.5  /  TBili  0.5  /  DBili  x   /  AST  19  /  ALT  9   /  AlkPhos  69  03-25    PTT - ( 25 Mar 2022 08:31 )  PTT:57.9 sec              CARDIAC MARKERS ( 23 Mar 2022 20:01 )  x     / <0.01 ng/mL / x     / x     / x                                                  -------------------------------------------------------------  RADIOLOGY:                                            --------------------------------------------------------------    PHYSICAL EXAM:    GENERAL: NAD, well-developed  CHEST/LUNG: Clear to auscultation bilaterally; No wheeze  HEART: Regular rate and rhythm; No murmurs, rubs, or gallops  ABDOMEN: Soft, Nontender, Nondistended; Bowel sounds present  EXTREMITIES:  left upper extremity edema  PSYCH: AAOx3  NEUROLOGY: non-focal  SKIN: No rashes or lesions  
Progress Note:  Provider Speciality                            Hospitalist      ILEANA MCNEIL MRN-[id number] 51y Female     CHIEF PRESENTING COMPLAINT:  Patient is a 51y old  Female who presents with a chief complaint of       SUBJECTIVE:  Patient was seen and examined at bedside. No new complaints described by patient in morning rounds.   No significant overnight events reported.     HISTORY OF PRESENTING ILLNESS:  HPI:  51 year old woman with PMH of lupus with lupus anticoagulant, rheumatoid arthritis, cervical cancer sp hysterectomy,  laryngeal cancer sp CT, RT, CHF s/p AICD+PPM, DVT/PE in 2015 on Xarelto, NSTEMI in 2015, subclavian stenosis s/p stent placement, HTN, HLD, PPM/AICD, who presents with LUE swelling and chest pain. patient with recent stent assessment s/p cath LUE 3/14,and follows with dr Guan. patient with worsening swelling and chest pain today prompting ed eval. denies fever, chills, SOB, back pain, numbness/weakness, syncope.    in the ED,pt's VS T(C): 36.9 (03-23-22 @ 23:57), Max: 37 (03-23-22 @ 18:03)  HR: 89 (03-23-22 @ 23:57) (89 - 117)  BP: 180/88 (03-23-22 @ 23:57) (141/88 - 180/88)  RR: 17 (03-23-22 @ 18:03) (17 - 17)  SpO2: 98% (03-23-22 @ 23:57) (97% - 98%), labs done were unremarkable.   < from: CT Chest w/ IV Cont (03.23.22 @ 20:10) >  Metallic densities are noted in the inferior and superior right segmental   pulmonary arteries. Findings are concerning for metallic emboli of   indeterminate age. No right heart strain is noted.  Findings may reflect phlebitis of the left axillary/subclavian vein.  Left subclavian vein stent is noted. Unable to evaluate patency as the   phase of contrast is predominantly arterial.  pt  was started on Heparin drip.   pt is admitted for workup/management (23 Mar 2022 23:58)        REVIEW OF SYSTEMS:  Patient denies any headache, any vision complaints, runny nose, fever, chills. Denies chest pain, shortness of breath, palpitation. Denies nausea, vomiting, abdominal pain or diarrhoea, Denies dysuria. Denies  localized weakness in any part of the body or numbness.   At least 10 systems were reviewed in ROS. All systems reviewed  are within normal limits except for the complaints as described in Subjective.    PAST MEDICAL & SURGICAL HISTORY:  PAST MEDICAL & SURGICAL HISTORY:  Lupus    RA (rheumatoid arthritis)    HTN (hypertension)    CHF (congestive heart failure)    COPD (chronic obstructive pulmonary disease)    DVT (deep venous thrombosis)    Pulmonary embolism    History of laryngeal cancer    GERD (gastroesophageal reflux disease)    Cervical cancer    S/P appendectomy    S/P cholecystectomy    AICD (automatic cardioverter/defibrillator) present    S/P hysterectomy    History of back surgery    H/O foot surgery    S/P eye surgery    Subclavian arterial stenosis            VITAL SIGNS:  Vital Signs Last 24 Hrs  T(C): 36.8 (25 Mar 2022 07:29), Max: 37.1 (24 Mar 2022 23:40)  T(F): 98.3 (25 Mar 2022 07:29), Max: 98.7 (24 Mar 2022 23:40)  HR: 73 (25 Mar 2022 07:29) (70 - 79)  BP: 161/70 (25 Mar 2022 07:29) (140/73 - 163/79)  BP(mean): --  RR: 19 (25 Mar 2022 07:29) (18 - 19)  SpO2: --          PHYSICAL EXAMINATION:  Not in acute distress, anxious  General: No icterus  HEENT:   no JVD.  Heart: S1+S2 audible  Lungs: bilateral  moderate air entry, no wheezing, no crepitations.  Abdomen: Soft, non-tender, non-distended , no  rigidity or guarding.  CNS: Awake alert, CN  grossly intact.  Extremities:  No edema            CONSULTS:  Consultant(s) Notes Reviewed by me.   Care Discussed with Consultants/Other Providers where required.        MEDICATIONS:  MEDICATIONS  (STANDING):  ALBUTerol  90 MICROgram(s) HFA Inhaler - Peds 4 Puff(s) Inhalation every 4 hours  ALPRAZolam 2 milliGRAM(s) Oral <User Schedule>  aspirin enteric coated 81 milliGRAM(s) Oral daily  budesonide  80 MICROgram(s)/formoterol 4.5 MICROgram(s) Inhaler 2 Puff(s) Inhalation two times a day  enoxaparin Injectable 70 milliGRAM(s) SubCutaneous every 12 hours  hydrALAZINE 50 milliGRAM(s) Oral two times a day  metoclopramide 10 milliGRAM(s) Oral Before meals and at bedtime  metoprolol succinate  milliGRAM(s) Oral daily  morphine ER Tablet 120 milliGRAM(s) Oral <User Schedule>  tiotropium 18 MICROgram(s) Capsule 1 Capsule(s) Inhalation daily    MEDICATIONS  (PRN):  acetaminophen     Tablet .. 650 milliGRAM(s) Oral every 6 hours PRN Temp greater or equal to 38C (100.4F), Mild Pain (1 - 3)  aluminum hydroxide/magnesium hydroxide/simethicone Suspension 30 milliLiter(s) Oral every 4 hours PRN Dyspepsia  melatonin 3 milliGRAM(s) Oral at bedtime PRN Insomnia  ondansetron Injectable 4 milliGRAM(s) IV Push every 8 hours PRN Nausea and/or Vomiting            ASSESSMENT:      51 year old woman with PMH of lupus with lupus anticoagulant, rheumatoid arthritis, cervical cancer sp hysterectomy,  laryngeal cancer sp CT, RT, CHF s/p AICD+PPM, DVT/PE in 2015 on Xarelto, NSTEMI in 2015, subclavian stenosis s/p stent placement, HTN, HLD, PPM/AICD, who presents with LUE swelling and chest pain.       Pulmonary emboli, possibly metallic  Possible Left subclavian vein stent thrombosis  Acute axillary & subclavian DVT  chronic indwelling LUE pacemaker and left axillosubclavian venous stent   Known h/o lupus anticoagulant      Acute axillary & subclavian DVT/PE-possible Xarelto failure. No right heart strain  Currently on heparin. Will switch to Lovenox and get hematology consult for a/c   Vascular -no intervention  EP- no intervention needed  History of long-QT syndrome and VT sp AICD  Chronic CHFpEF-euvolemic  SLE- not in flare  Continue current medical management for active chronic comorbid conditions.  Supportive care including activity, diet, goals of care discussed in AM rounds.  Discussed with  / in AM rounds  Handoff: Anticipated for discharge tomorrow pending stability          
ILEANA MCNEIL 51y Female  MRN#: [id number]     Hospital Day: 1d    Pt is currently admitted with the primary diagnosis of acute DVT of left internal jugular, subclavian, axillary and brachial veins.    SUBJECTIVE    No Overnight events     No Subjective complaints     Present Today:   - Cayden:  No                                             ----------------------------------------------------------  OBJECTIVE  PAST MEDICAL & SURGICAL HISTORY  Lupus    RA (rheumatoid arthritis)    HTN (hypertension)    CHF (congestive heart failure)    COPD (chronic obstructive pulmonary disease)    DVT (deep venous thrombosis)    Pulmonary embolism    History of laryngeal cancer    GERD (gastroesophageal reflux disease)    Cervical cancer    S/P appendectomy    S/P cholecystectomy    AICD (automatic cardioverter/defibrillator) present    S/P hysterectomy    History of back surgery    H/O foot surgery    S/P eye surgery    Subclavian arterial stenosis                                              -----------------------------------------------------------  ALLERGIES:  adhesives (Rash; Urticaria)  Arava (Anaphylaxis; Angioedema)  Avelox (Other)  Plaquenil (Other)  Vantin (Other (Mild))                                            ------------------------------------------------------------    HOME MEDICATIONS  Home Medications:  albuterol 90 mcg/inh inhalation aerosol: 2 puff(s) inhaled every 6 hours (24 Mar 2022 00:49)  ALPRAZolam 1 mg oral tablet: 1 tab(s) orally 3 times a day, As needed, Anxiety (24 Mar 2022 00:49)  Benlysta: 200 milligram(s) intravenous once a week (24 Mar 2022 00:49)  cyanocobalamin 1000 mcg/mL injectable solution: 1 milliliter(s) injectable once a week (24 Mar 2022 00:49)  folic acid 1 mg oral tablet: 1 tab(s) orally once a day (24 Mar 2022 00:49)  hydrALAZINE 50 mg oral tablet: 2 tab(s) orally once a day (24 Mar 2022 00:49)  Metoprolol Succinate  mg oral tablet, extended release: 1 tab(s) orally once a day (24 Mar 2022 00:49)  MS Contin 60 mg oral tablet, extended release: 1 tab(s) orally 3 times a day (after meals) (24 Mar 2022 00:49)  Reglan 10 mg oral tablet: 1 tab(s) orally 4 times a day (before meals and at bedtime) (24 Mar 2022 00:49)  Trelegy Ellipta 100 mcg-62.5 mcg-25 mcg/inh inhalation powder: 1 puff(s) inhaled once a day (24 Mar 2022 00:49)  Xarelto 20 mg oral tablet: 1 tab(s) orally once a day (in the evening) (24 Mar 2022 00:49)                           MEDICATIONS:  STANDING MEDICATIONS  ALBUTerol  90 MICROgram(s) HFA Inhaler - Peds 4 Puff(s) Inhalation every 4 hours  ALPRAZolam 2 milliGRAM(s) Oral <User Schedule>  aspirin enteric coated 81 milliGRAM(s) Oral daily  budesonide  80 MICROgram(s)/formoterol 4.5 MICROgram(s) Inhaler 2 Puff(s) Inhalation two times a day  enoxaparin Injectable 70 milliGRAM(s) SubCutaneous every 12 hours  hydrALAZINE 50 milliGRAM(s) Oral two times a day  metoclopramide 10 milliGRAM(s) Oral Before meals and at bedtime  metoprolol succinate  milliGRAM(s) Oral daily  morphine ER Tablet 120 milliGRAM(s) Oral <User Schedule>  tiotropium 18 MICROgram(s) Capsule 1 Capsule(s) Inhalation daily    PRN MEDICATIONS  acetaminophen     Tablet .. 650 milliGRAM(s) Oral every 6 hours PRN  aluminum hydroxide/magnesium hydroxide/simethicone Suspension 30 milliLiter(s) Oral every 4 hours PRN  melatonin 3 milliGRAM(s) Oral at bedtime PRN  ondansetron Injectable 4 milliGRAM(s) IV Push every 8 hours PRN                                            ------------------------------------------------------------  VITAL SIGNS: Last 24 Hours  T(C): 36.7 (24 Mar 2022 08:11), Max: 37 (23 Mar 2022 18:03)  T(F): 98.1 (24 Mar 2022 08:11), Max: 98.6 (23 Mar 2022 18:03)  HR: 84 (24 Mar 2022 08:11) (70 - 117)  BP: 161/82 (24 Mar 2022 08:11) (141/88 - 180/88)  BP(mean): --  RR: 18 (24 Mar 2022 08:11) (17 - 18)  SpO2: 95% (24 Mar 2022 04:08) (95% - 98%)      03-23-22 @ 07:01  -  03-24-22 @ 07:00  --------------------------------------------------------  IN: 52 mL / OUT: 0 mL / NET: 52 mL                                             --------------------------------------------------------------  LABS:                        14.2   9.60  )-----------( 178      ( 24 Mar 2022 04:30 )             42.9     03-24    139  |  97<L>  |  12  ----------------------------<  123<H>  3.6   |  24  |  0.8    Ca    9.5      24 Mar 2022 04:30    TPro  6.8  /  Alb  4.3  /  TBili  0.5  /  DBili  x   /  AST  17  /  ALT  9   /  AlkPhos  71  03-24    PTT - ( 24 Mar 2022 12:28 )  PTT:57.1 sec      Troponin T, Serum: <0.01 ng/mL (03-23-22 @ 20:01)          CARDIAC MARKERS ( 23 Mar 2022 20:01 )  x     / <0.01 ng/mL / x     / x     / x                                                  -------------------------------------------------------------  RADIOLOGY:                                            --------------------------------------------------------------    PHYSICAL EXAM:    GENERAL: NAD, well-developed  CHEST/LUNG: Clear to auscultation bilaterally; No wheeze  HEART: Regular rate and rhythm; No murmurs, rubs, or gallops  ABDOMEN: Soft, Nontender, Nondistended; Bowel sounds present  EXTREMITIES:  left upper extremity edema  PSYCH: AAOx3  NEUROLOGY: non-focal  SKIN: No rashes or lesions  
Progress Note:  Provider Speciality                            Hospitalist      ILEANA MCNEIL MRN-[id number] 51y Female     CHIEF PRESENTING COMPLAINT:  Patient is a 51y old  Female who presents with a chief complaint of       SUBJECTIVE:  Patient was seen and examined at bedside. No new complaints described by patient in morning rounds.   No significant overnight events reported.     HISTORY OF PRESENTING ILLNESS:  HPI:  51 year old woman with PMH of lupus with lupus anticoagulant, rheumatoid arthritis, cervical cancer sp hysterectomy,  laryngeal cancer sp CT, RT, CHF s/p AICD+PPM, DVT/PE in 2015 on Xarelto, NSTEMI in 2015, subclavian stenosis s/p stent placement, HTN, HLD, PPM/AICD, who presents with LUE swelling and chest pain. patient with recent stent assessment s/p cath LUE 3/14,and follows with dr Guan. patient with worsening swelling and chest pain today prompting ed eval. denies fever, chills, SOB, back pain, numbness/weakness, syncope.    in the ED,pt's VS T(C): 36.9 (03-23-22 @ 23:57), Max: 37 (03-23-22 @ 18:03)  HR: 89 (03-23-22 @ 23:57) (89 - 117)  BP: 180/88 (03-23-22 @ 23:57) (141/88 - 180/88)  RR: 17 (03-23-22 @ 18:03) (17 - 17)  SpO2: 98% (03-23-22 @ 23:57) (97% - 98%), labs done were unremarkable.   < from: CT Chest w/ IV Cont (03.23.22 @ 20:10) >  Metallic densities are noted in the inferior and superior right segmental   pulmonary arteries. Findings are concerning for metallic emboli of   indeterminate age. No right heart strain is noted.  Findings may reflect phlebitis of the left axillary/subclavian vein.  Left subclavian vein stent is noted. Unable to evaluate patency as the   phase of contrast is predominantly arterial.  pt  was started on Heparin drip.   pt is admitted for workup/management (23 Mar 2022 23:58)        REVIEW OF SYSTEMS:  Patient denies any headache, any vision complaints, runny nose, fever, chills. Denies chest pain, shortness of breath, palpitation. Denies nausea, vomiting, abdominal pain or diarrhoea, Denies dysuria. Denies  localized weakness in any part of the body or numbness.   At least 10 systems were reviewed in ROS. All systems reviewed  are within normal limits except for the complaints as described in Subjective.    PAST MEDICAL & SURGICAL HISTORY:  PAST MEDICAL & SURGICAL HISTORY:  Lupus    RA (rheumatoid arthritis)    HTN (hypertension)    CHF (congestive heart failure)    COPD (chronic obstructive pulmonary disease)    DVT (deep venous thrombosis)    Pulmonary embolism    History of laryngeal cancer    GERD (gastroesophageal reflux disease)    Cervical cancer    S/P appendectomy    S/P cholecystectomy    AICD (automatic cardioverter/defibrillator) present    S/P hysterectomy    History of back surgery    H/O foot surgery    S/P eye surgery    Subclavian arterial stenosis            VITAL SIGNS:  Vital Signs Last 24 Hrs  T(C): 36 (26 Mar 2022 07:14), Max: 36.4 (26 Mar 2022 00:10)  T(F): 96.8 (26 Mar 2022 07:14), Max: 97.6 (26 Mar 2022 00:10)  HR: 69 (26 Mar 2022 07:14) (69 - 75)  BP: 156/76 (26 Mar 2022 07:14) (140/67 - 185/87)  BP(mean): --  RR: 18 (26 Mar 2022 07:14) (18 - 19)  SpO2: --      PHYSICAL EXAMINATION:  Not in acute distress, anxious  General: No icterus  HEENT:   no JVD.  Heart: S1+S2 audible  Lungs: bilateral  moderate air entry, no wheezing, no crepitations.  Abdomen: Soft, non-tender, non-distended , no  rigidity or guarding.  CNS: Awake alert, CN  grossly intact.  Extremities:  No edema            CONSULTS:  Consultant(s) Notes Reviewed by me.   Care Discussed with Consultants/Other Providers where required.        MEDICATIONS:  MEDICATIONS  (STANDING):  ALBUTerol  90 MICROgram(s) HFA Inhaler - Peds 4 Puff(s) Inhalation every 4 hours  ALPRAZolam 2 milliGRAM(s) Oral <User Schedule>  aspirin enteric coated 81 milliGRAM(s) Oral daily  budesonide  80 MICROgram(s)/formoterol 4.5 MICROgram(s) Inhaler 2 Puff(s) Inhalation two times a day  enoxaparin Injectable 70 milliGRAM(s) SubCutaneous every 12 hours  hydrALAZINE 50 milliGRAM(s) Oral two times a day  metoclopramide 10 milliGRAM(s) Oral Before meals and at bedtime  metoprolol succinate  milliGRAM(s) Oral daily  morphine ER Tablet 120 milliGRAM(s) Oral <User Schedule>  tiotropium 18 MICROgram(s) Capsule 1 Capsule(s) Inhalation daily    MEDICATIONS  (PRN):  acetaminophen     Tablet .. 650 milliGRAM(s) Oral every 6 hours PRN Temp greater or equal to 38C (100.4F), Mild Pain (1 - 3)  aluminum hydroxide/magnesium hydroxide/simethicone Suspension 30 milliLiter(s) Oral every 4 hours PRN Dyspepsia  melatonin 3 milliGRAM(s) Oral at bedtime PRN Insomnia  ondansetron Injectable 4 milliGRAM(s) IV Push every 8 hours PRN Nausea and/or Vomiting            ASSESSMENT:      51 year old woman with PMH of lupus with lupus anticoagulant, rheumatoid arthritis, cervical cancer sp hysterectomy,  laryngeal cancer sp CT, RT, CHF s/p AICD+PPM, DVT/PE in 2015 on Xarelto, NSTEMI in 2015, subclavian stenosis s/p stent placement, HTN, HLD, PPM/AICD, who presents with LUE swelling and chest pain.       Pulmonary emboli, possibly metallic  Possible Left subclavian vein stent thrombosis  Acute axillary & subclavian DVT  chronic indwelling LUE pacemaker and left axillosubclavian venous stent   Known h/o lupus anticoagulant      Acute axillary & subclavian DVT/PE-possible Xarelto failure. No right heart strain  Lovenox as per oncology for anticoagulation   Vascular -no intervention  EP- no intervention needed  History of long-QT syndrome and VT sp AICD  Chronic CHFpEF-euvolemic  SLE- not in flare  Continue current medical management for active chronic comorbid conditions.  Supportive care including activity, diet, goals of care discussed in AM rounds.  Discussed with  / in AM rounds  Handoff: stable for discharge today

## 2022-03-29 DIAGNOSIS — Z85.41 PERSONAL HISTORY OF MALIGNANT NEOPLASM OF CERVIX UTERI: ICD-10-CM

## 2022-03-29 DIAGNOSIS — Z85.21 PERSONAL HISTORY OF MALIGNANT NEOPLASM OF LARYNX: ICD-10-CM

## 2022-03-29 DIAGNOSIS — I50.32 CHRONIC DIASTOLIC (CONGESTIVE) HEART FAILURE: ICD-10-CM

## 2022-03-29 DIAGNOSIS — I11.0 HYPERTENSIVE HEART DISEASE WITH HEART FAILURE: ICD-10-CM
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
## 2022-03-29 DIAGNOSIS — I82.B12 ACUTE EMBOLISM AND THROMBOSIS OF LEFT SUBCLAVIAN VEIN: ICD-10-CM

## 2022-03-29 DIAGNOSIS — T82.868A THROMBOSIS DUE TO VASCULAR PROSTHETIC DEVICES, IMPLANTS AND GRAFTS, INITIAL ENCOUNTER: ICD-10-CM

## 2022-03-29 DIAGNOSIS — I26.99 OTHER PULMONARY EMBOLISM WITHOUT ACUTE COR PULMONALE: ICD-10-CM

## 2022-03-29 DIAGNOSIS — I82.C12 ACUTE EMBOLISM AND THROMBOSIS OF LEFT INTERNAL JUGULAR VEIN: ICD-10-CM

## 2022-03-29 DIAGNOSIS — M06.9 RHEUMATOID ARTHRITIS, UNSPECIFIED: ICD-10-CM

## 2022-03-29 DIAGNOSIS — I80.8 PHLEBITIS AND THROMBOPHLEBITIS OF OTHER SITES: ICD-10-CM

## 2022-03-29 DIAGNOSIS — M32.9 SYSTEMIC LUPUS ERYTHEMATOSUS, UNSPECIFIED: ICD-10-CM

## 2022-03-29 DIAGNOSIS — K21.9 GASTRO-ESOPHAGEAL REFLUX DISEASE WITHOUT ESOPHAGITIS: ICD-10-CM

## 2022-03-29 DIAGNOSIS — Z95.810 PRESENCE OF AUTOMATIC (IMPLANTABLE) CARDIAC DEFIBRILLATOR: ICD-10-CM
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
## 2022-03-29 DIAGNOSIS — R60.9 EDEMA, UNSPECIFIED: ICD-10-CM

## 2022-03-29 DIAGNOSIS — I25.2 OLD MYOCARDIAL INFARCTION: ICD-10-CM

## 2022-03-29 DIAGNOSIS — I82.622 ACUTE EMBOLISM AND THROMBOSIS OF DEEP VEINS OF LEFT UPPER EXTREMITY: ICD-10-CM

## 2022-03-29 DIAGNOSIS — Z79.01 LONG TERM (CURRENT) USE OF ANTICOAGULANTS: ICD-10-CM

## 2022-03-29 DIAGNOSIS — J44.9 CHRONIC OBSTRUCTIVE PULMONARY DISEASE, UNSPECIFIED: ICD-10-CM

## 2022-03-29 DIAGNOSIS — Z98.890 OTHER SPECIFIED POSTPROCEDURAL STATES: ICD-10-CM

## 2022-03-29 DIAGNOSIS — I82.A12 ACUTE EMBOLISM AND THROMBOSIS OF LEFT AXILLARY VEIN: ICD-10-CM

## 2022-03-29 DIAGNOSIS — I70.8 ATHEROSCLEROSIS OF OTHER ARTERIES: ICD-10-CM

## 2022-03-29 DIAGNOSIS — Z90.49 ACQUIRED ABSENCE OF OTHER SPECIFIED PARTS OF DIGESTIVE TRACT: ICD-10-CM

## 2022-03-31 ENCOUNTER — APPOINTMENT (OUTPATIENT)
Age: 52
End: 2022-03-31
Payer: MEDICARE

## 2022-03-31 ENCOUNTER — APPOINTMENT (OUTPATIENT)
Dept: HEMATOLOGY ONCOLOGY | Facility: CLINIC | Age: 52
End: 2022-03-31
Payer: MEDICARE

## 2022-03-31 ENCOUNTER — APPOINTMENT (OUTPATIENT)
Dept: VASCULAR SURGERY | Facility: CLINIC | Age: 52
End: 2022-03-31
Payer: MEDICARE

## 2022-03-31 VITALS
DIASTOLIC BLOOD PRESSURE: 78 MMHG | SYSTOLIC BLOOD PRESSURE: 136 MMHG | RESPIRATION RATE: 14 BRPM | OXYGEN SATURATION: 98 % | HEART RATE: 86 BPM | BODY MASS INDEX: 29.23 KG/M2 | WEIGHT: 165 LBS | HEIGHT: 63 IN

## 2022-03-31 VITALS
DIASTOLIC BLOOD PRESSURE: 69 MMHG | HEART RATE: 71 BPM | BODY MASS INDEX: 29.76 KG/M2 | RESPIRATION RATE: 14 BRPM | SYSTOLIC BLOOD PRESSURE: 149 MMHG | WEIGHT: 168 LBS | TEMPERATURE: 97.4 F

## 2022-03-31 VITALS — BODY MASS INDEX: 29.23 KG/M2 | WEIGHT: 165 LBS | HEIGHT: 63 IN

## 2022-03-31 DIAGNOSIS — M79.89 OTHER SPECIFIED SOFT TISSUE DISORDERS: ICD-10-CM

## 2022-03-31 DIAGNOSIS — I82.B19 ACUTE EMBOLISM AND THROMBOSIS OF UNSPECIFIED SUBCLAVIAN VEIN: ICD-10-CM

## 2022-03-31 PROCEDURE — 99213 OFFICE O/P EST LOW 20 MIN: CPT

## 2022-03-31 PROCEDURE — 99214 OFFICE O/P EST MOD 30 MIN: CPT

## 2022-03-31 NOTE — HISTORY OF PRESENT ILLNESS
[de-identified] : The patient is coming for a follow up a few days after being  discharged from the hospital where she was admitted with LUE DVT this time. She was seen by the vascular surgeon because of her stents (for details for her vascular problems, see previous notes including those from her vascular surgeon).\par She has been on Xarelto for her long standing thrombophilia secondary to lupus anticoagulant (in a patient with SLE).\par She was started on heparin and discharged on Lovenox. \par At present, she is complaining of diffuse aches and pains related most likely to her lupus in addition to persistent swelling of the LUE which has improved compared to a couple of weeks ago.\par We discussed at length her management. At present, the patient is also complaining of sudden, sharp pains in the left thigh from the hip down to the knee. Not clear if secondary to a vascular event or back issues. The pain is not constant and there is no significant swelling of the LLE.\par At present, the recommendation is to continue with Lovenox since she developed a new clot while on Xarelto. She stated that she had been on Coumadin several years ago but developed clots while on it. However, after questioning about the management at that time, it's not clear if she truly was on it or the INR was controlled tightly.\par At this time, she was recommended to continue with Lovenox twice a day for another 2 weeks and repeat CT scans before switching to Coumadin with a recommendation of tight control of the INR to be kept between 3 and 4. \par The patient was also told to follow up with her rheumatologist. However, she asked for other names and they were given.\par Will follow up in 2 weeks and may switch to Coumadin further clarifying the issue of why Coumadin (if it was truly Coumadin treatment that she was on) was discontinued. Her impression was that it was because of difficulty controlling the INR.\par \par All questions answered.\par

## 2022-03-31 NOTE — HISTORY OF PRESENT ILLNESS
[FreeTextEntry1] : 52 y/o female with h/o laryngeal cancer, s/p chemotherapy and radiation treatment, h/o pacemaker placement initially in 2003, underwent leads replaced 10 years ago, h/o LLE DVT and PE, h/o Lupus anticoagulant on lifelong Xarelto, had left arm pain and swelling, intermittently since December 2020. She underwent a left subclavian vein venoplasty on 3/2/2021. She had persistent left UE pain and swelling and on 4/27/21, she underwent extraction of old pacemaker wires by EP and Cardiothoracic surgery and at the same time had left subclavian vein stenting by my partner, Dr. Bryson. Two new pacemaker wires were placed by EP after the vein stenting. Her arm swelling had improved after the stenting and now developed new left arm swelling since last week. 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 \par \par \par

## 2022-03-31 NOTE — DISCUSSION/SUMMARY
[FreeTextEntry1] : COPD STABLE ON TRELEGY\par METALLIC PE\par LUE SWELLING\par CHEST CT REVIEWED\par HEMO/ VASCULAR REVIEWED

## 2022-03-31 NOTE — ASSESSMENT
[FreeTextEntry1] : 52 y/o female with h/o laryngeal cancer, s/p chemotherapy and radiation treatment, h/o pacemaker placement initially in 2003, underwent lead replacement 10 years ago, h/o LLE DVT and PE, h/o Lupus anticoagulant on lifelong Xarelto. She underwent a left subclavian vein stent on 4/27/2021 and venogram in March 2022 showed occlusion of the left subclavian stent.\par \par She is on Lovenox injections for 2 more weeks and will be switched to NOAC after that.\par \par I recommend conservative management at this time and I am prescribing her compression sleeve for the left arm to help improve the arm swelling.\par \par I spent 40 minutes with patient performing physical exam, reviewing Venogram results, discussing treatment plan and followup.\par

## 2022-03-31 NOTE — PHYSICAL EXAM
[No Acute Distress] : no acute distress [Normal Oropharynx] : normal oropharynx [Normal Appearance] : normal appearance [No Neck Mass] : no neck mass [Normal Rate/Rhythm] : normal rate/rhythm [Normal S1, S2] : normal s1, s2 [No Murmurs] : no murmurs [No Abnormalities] : no abnormalities [Benign] : benign [Normal Gait] : normal gait [No Clubbing] : no clubbing [No Cyanosis] : no cyanosis [No Edema] : no edema [FROM] : FROM [Normal Color/ Pigmentation] : normal color/ pigmentation [No Focal Deficits] : no focal deficits [Oriented x3] : oriented x3 [Normal Affect] : normal affect [TextBox_105] : APPLEE SWELLING [TextBox_68] : DEC BS BOTH BASES

## 2022-04-07 ENCOUNTER — NON-APPOINTMENT (OUTPATIENT)
Age: 52
End: 2022-04-07

## 2022-04-14 ENCOUNTER — APPOINTMENT (OUTPATIENT)
Dept: HEMATOLOGY ONCOLOGY | Facility: CLINIC | Age: 52
End: 2022-04-14
Payer: MEDICARE

## 2022-04-14 VITALS
HEIGHT: 63 IN | WEIGHT: 166 LBS | DIASTOLIC BLOOD PRESSURE: 74 MMHG | SYSTOLIC BLOOD PRESSURE: 150 MMHG | HEART RATE: 77 BPM | BODY MASS INDEX: 29.41 KG/M2 | RESPIRATION RATE: 14 BRPM | TEMPERATURE: 96.6 F

## 2022-04-14 PROCEDURE — 99213 OFFICE O/P EST LOW 20 MIN: CPT

## 2022-04-14 NOTE — ASSESSMENT
[FreeTextEntry1] : 51 year old woman with PMH of lupus with lupus anticoagulant, rheumatoid arthritis, cervical cancer sp hysterectomy, laryngeal cancer sp CT, RT, CHF s/p AICD+PPM, DVT/PE in 2015 on Xarelto, NSTEMI in 2015, subclavian stenosis s/p stent placement, HTN, HLD, who presents with worsening LUE swelling and chest pain. \par \par Pt was recently admitted after she had stent assessment s/p cath LUE 3/14 with Dr. Guan. Patient presented with worsening swelling and chest pain on 3/23 .On admission, Duplex of LUE had positive\par findings of an acute deep vein thrombosis of the left internal jugular, subclavian axillary and brachial veins. \par \par Pt has been taking Lovenox twice a day since discharge. Reports having easy bruising but no bleeding noted. We discussed again switching her to Coumadin but pt reports she failed Coumadin in past and is not willing to take it again. We also discussed the possibility of once a day Lovenox but since pt had a recent event with extensive DVTs so are hesitant in switching her to once a day Lovenox at this time. \par We agreed to continue with Lovenox bid. Pt also recommended to take a second opinion, she was recommended to see Dr Armani Velasquez at [facility] for her thrombophilia.\par \par Pt will f/u after evaluation by Dr Velasquez.\par \par Pt demonstrated understanding about the plan. \par \par \par Pt was seen and examined with Dr Sultana who agreed with plan of care.

## 2022-04-14 NOTE — PHYSICAL EXAM
[Fully active, able to carry on all pre-disease performance without restriction] : Status 0 - Fully active, able to carry on all pre-disease performance without restriction [Normal] : grossly intact [de-identified] : multiple brusies at the injection site  [de-identified] : Superficial thrombophlebitis of LUE

## 2022-04-14 NOTE — HISTORY OF PRESENT ILLNESS
[de-identified] : Pt returns today with her  , she continues to self inject Lovenox twice a day and reports that she has noticed blacks and blues all over her body. \par She was seen on 3/31 after being  discharged from the hospital where she was admitted with LUE DVT this time. She was seen by the vascular surgeon because of her stents (for details for her vascular problems, see previous notes including those from her vascular surgeon).\par She has been on Xarelto for her long standing thrombophilia secondary to lupus anticoagulant (in a patient with SLE).\par She was started on heparin and discharged on Lovenox. \par At present, she is complaining of diffuse aches and pains related most likely to her lupus in addition to persistent swelling of the LUE which has improved compared to a couple of weeks ago.\par We discussed at length her management. At present, the patient is also complaining of sudden, sharp pains in the left thigh from the hip down to the knee. Not clear if secondary to a vascular event or back issues. The pain is not constant and there is no significant swelling of the LLE.\par At present, the recommendation is to continue with Lovenox since she developed a new clot while on Xarelto. She stated that she had been on Coumadin several years ago but developed clots while on it. However, after questioning about the management at that time, it's not clear if she truly was on it or the INR was controlled tightly. However, it seems that they were having hard time regulating the INR and she developed clots while on it.\par At this time, she was recommended to continue with Lovenox twice a day for another 2 weeks and repeat CT scans before switching to Coumadin with a recommendation of tight control of the INR to be kept between 3 and 4. \par \par Pt today wishes to continue with Lovenox as she is scared that she might develop another clot while on coumadin. \par \par

## 2022-04-14 NOTE — REVIEW OF SYSTEMS
[Fatigue] : fatigue [Recent Change In Weight] : ~T recent weight change [Negative] : Neurological [Fever] : no fever [Chills] : no chills [Chest Pain] : no chest pain [Palpitations] : no palpitations [Leg Claudication] : no intermittent leg claudication [Lower Ext Edema] : no lower extremity edema [de-identified] : multiple bruises in different stages of healing.

## 2022-04-20 ENCOUNTER — APPOINTMENT (OUTPATIENT)
Dept: CARDIOLOGY | Facility: CLINIC | Age: 52
End: 2022-04-20
Payer: MEDICARE

## 2022-04-20 ENCOUNTER — NON-APPOINTMENT (OUTPATIENT)
Age: 52
End: 2022-04-20

## 2022-04-20 PROCEDURE — 93296 REM INTERROG EVL PM/IDS: CPT

## 2022-04-20 PROCEDURE — 93295 DEV INTERROG REMOTE 1/2/MLT: CPT

## 2022-04-26 ENCOUNTER — APPOINTMENT (OUTPATIENT)
Dept: HEMATOLOGY ONCOLOGY | Facility: CLINIC | Age: 52
End: 2022-04-26
Payer: MEDICARE

## 2022-04-26 VITALS
DIASTOLIC BLOOD PRESSURE: 75 MMHG | WEIGHT: 166 LBS | TEMPERATURE: 97.6 F | BODY MASS INDEX: 29.41 KG/M2 | SYSTOLIC BLOOD PRESSURE: 153 MMHG | HEART RATE: 80 BPM | HEIGHT: 63 IN

## 2022-04-26 PROCEDURE — 99213 OFFICE O/P EST LOW 20 MIN: CPT

## 2022-04-26 RX ORDER — FONDAPARINUX SODIUM 7.5 MG/.6ML
7.5 INJECTION, SOLUTION SUBCUTANEOUS
Qty: 30 | Refills: 3 | Status: ACTIVE | COMMUNITY
Start: 2022-04-26 | End: 1900-01-01

## 2022-04-26 NOTE — REVIEW OF SYSTEMS
[Fatigue] : fatigue [SOB on Exertion] : shortness of breath during exertion [Joint Pain] : joint pain [Joint Stiffness] : joint stiffness [Anxiety] : anxiety [Easy Bruising] : a tendency for easy bruising [Negative] : Genitourinary [de-identified] : Multiple hematomas corresponding to the Lovenox injections in addition to scattered ones on her limbs [de-identified] : Generalized weakness

## 2022-04-26 NOTE — REASON FOR VISIT
[Follow-Up Visit] : a follow-up visit for [Spouse] : spouse [FreeTextEntry2] : Thrombophilia, antiphospholipid syndrome

## 2022-04-26 NOTE — HISTORY OF PRESENT ILLNESS
[de-identified] : The patient is coming to discuss what to do next.\par Presently she is on Lovenox injections twice a day. She is bruising especially at the sites of injections but, at the same time, she also has bruises on her arms in addition to subcutaneous scattered nodularities in areas not affected by the injections.\par The patient is very anxious in addition to feeling weak. She is losing her voice again and is due to see the ENT specialist. \par The situation was discussed at length. We went over the different options of how to replace Lovenox which started after discontinuing Xarelto. She has not been successful with Coumadin since her diet is not regular and sometimes she may not eat for 3 days in a row.\par The dilemmas were discussed. The different options were considered. One alternative is to use Fondaparinux to which she was agreeable especially that it is once a day injection. The order was placed with her pharmacy.\par In the meantime, she was also recommended to see her surgeon for possible biopsy in order to explain the skin nodularities which had nothing to do with the injections.\par She seems to be up to date otherwise with her screenings.\par \par The situation was discussed earlier with Dr. RENAN Velasquez (coagulation specialist) at [facility] who made several suggestions including using Fondaparinux, considering Rituxan. Plaquenil was also suggested but the patient stated that she was allergic to it. All these suggestions were presented to the patient. \par For the time being, will proceed with Fondaparinux and recommended her to see a surgeon for the subcutaneous nodules for possible ?biopsy?.\par \par All questions answered.

## 2022-04-26 NOTE — PHYSICAL EXAM
[Ambulatory and capable of all self care but unable to carry out any work activities] : Status 2- Ambulatory and capable of all self care but unable to carry out any work activities. Up and about more than 50% of waking hours [Normal] : PERRL, EOMI, no conjunctival infection, anicteric [de-identified] : Hoarse voice hardly audible. [de-identified] : Skin of the abdomen showing bruised areas from the Lovenox injections. [de-identified] : Multiple, scattered areas of hematomas of various sizes. In addition, also scattered subcutaneous nodules on the limbs, with no discoloration for most of them but occasionally some suggestive of bruises too. [de-identified] : Anxious, fearful.

## 2022-04-29 RX ORDER — AMOXICILLIN AND CLAVULANATE POTASSIUM 875; 125 MG/1; MG/1
875-125 TABLET, COATED ORAL
Qty: 14 | Refills: 1 | Status: ACTIVE | COMMUNITY
Start: 2021-07-02 | End: 1900-01-01

## 2022-05-03 ENCOUNTER — RESULT REVIEW (OUTPATIENT)
Age: 52
End: 2022-05-03

## 2022-05-03 ENCOUNTER — APPOINTMENT (OUTPATIENT)
Dept: VASCULAR SURGERY | Facility: CLINIC | Age: 52
End: 2022-05-03
Payer: MEDICARE

## 2022-05-03 VITALS
WEIGHT: 165 LBS | SYSTOLIC BLOOD PRESSURE: 139 MMHG | HEIGHT: 63 IN | BODY MASS INDEX: 29.23 KG/M2 | DIASTOLIC BLOOD PRESSURE: 71 MMHG

## 2022-05-03 DIAGNOSIS — L03.116 CELLULITIS OF LEFT LOWER LIMB: ICD-10-CM

## 2022-05-03 PROCEDURE — 99213 OFFICE O/P EST LOW 20 MIN: CPT

## 2022-05-03 NOTE — HISTORY OF PRESENT ILLNESS
[FreeTextEntry1] : The patient is a 51-year-old female who presents for evaluation of her left leg skin infection. She presents for rule out septic phlebitis. The patient states she presented to her primary care physician a few days ago and was started on Augmentin. She is in severe pain in her left calf and having difficulty ambulating. His area of warmth and erythema circular lesion over her left medial calf.

## 2022-05-03 NOTE — REASON FOR VISIT
[Acute] : an acute visit [FreeTextEntry1] : left leg pain and tenderness and erythemaa medial lower extremity.

## 2022-05-03 NOTE — DATA REVIEWED
[FreeTextEntry1] : venous duplex- Left there is no evidence of deep venous thrombosis or superficial phlebitis. All major veins are patent and compressible.

## 2022-05-03 NOTE — ASSESSMENT
[FreeTextEntry1] : The patient is a 51-year-old female with a new left leg infection. She's been on Augmentin for the past few days with minimal improvement. She's having excruciating pain in her calf. I'm sending the patient to the emergency department for evaluation she may require a CT scan to evaluate for an infection deeper in the muscle.

## 2022-05-03 NOTE — PHYSICAL EXAM
[2+] : left 2+ [FreeTextEntry1] : left medial calf 3 cm x 3 cm circumferential erythema warmth and tenderness. Excoriated skin.

## 2022-05-05 ENCOUNTER — INPATIENT (INPATIENT)
Facility: HOSPITAL | Age: 52
LOS: 9 days | Discharge: HOME HEALTH | End: 2022-05-15
Attending: STUDENT IN AN ORGANIZED HEALTH CARE EDUCATION/TRAINING PROGRAM | Admitting: STUDENT IN AN ORGANIZED HEALTH CARE EDUCATION/TRAINING PROGRAM
Payer: MEDICARE

## 2022-05-05 VITALS
HEIGHT: 63 IN | DIASTOLIC BLOOD PRESSURE: 91 MMHG | HEART RATE: 114 BPM | TEMPERATURE: 98 F | RESPIRATION RATE: 18 BRPM | WEIGHT: 156.97 LBS | OXYGEN SATURATION: 96 % | SYSTOLIC BLOOD PRESSURE: 142 MMHG

## 2022-05-05 DIAGNOSIS — Z98.890 OTHER SPECIFIED POSTPROCEDURAL STATES: Chronic | ICD-10-CM

## 2022-05-05 DIAGNOSIS — I77.1 STRICTURE OF ARTERY: Chronic | ICD-10-CM

## 2022-05-05 DIAGNOSIS — Z90.49 ACQUIRED ABSENCE OF OTHER SPECIFIED PARTS OF DIGESTIVE TRACT: Chronic | ICD-10-CM

## 2022-05-05 DIAGNOSIS — Z90.710 ACQUIRED ABSENCE OF BOTH CERVIX AND UTERUS: Chronic | ICD-10-CM

## 2022-05-05 DIAGNOSIS — Z95.810 PRESENCE OF AUTOMATIC (IMPLANTABLE) CARDIAC DEFIBRILLATOR: Chronic | ICD-10-CM

## 2022-05-05 LAB
ALBUMIN SERPL ELPH-MCNC: 4.7 G/DL — SIGNIFICANT CHANGE UP (ref 3.5–5.2)
ALP SERPL-CCNC: 81 U/L — SIGNIFICANT CHANGE UP (ref 30–115)
ALT FLD-CCNC: 8 U/L — SIGNIFICANT CHANGE UP (ref 0–41)
ANION GAP SERPL CALC-SCNC: 14 MMOL/L — SIGNIFICANT CHANGE UP (ref 7–14)
APTT BLD: 40.9 SEC — HIGH (ref 27–39.2)
AST SERPL-CCNC: 17 U/L — SIGNIFICANT CHANGE UP (ref 0–41)
BASOPHILS # BLD AUTO: 0.03 K/UL — SIGNIFICANT CHANGE UP (ref 0–0.2)
BASOPHILS NFR BLD AUTO: 0.3 % — SIGNIFICANT CHANGE UP (ref 0–1)
BILIRUB SERPL-MCNC: 0.4 MG/DL — SIGNIFICANT CHANGE UP (ref 0.2–1.2)
BUN SERPL-MCNC: 15 MG/DL — SIGNIFICANT CHANGE UP (ref 10–20)
CALCIUM SERPL-MCNC: 10.2 MG/DL — HIGH (ref 8.5–10.1)
CHLORIDE SERPL-SCNC: 100 MMOL/L — SIGNIFICANT CHANGE UP (ref 98–110)
CO2 SERPL-SCNC: 23 MMOL/L — SIGNIFICANT CHANGE UP (ref 17–32)
CREAT SERPL-MCNC: 1 MG/DL — SIGNIFICANT CHANGE UP (ref 0.7–1.5)
EGFR: 68 ML/MIN/1.73M2 — SIGNIFICANT CHANGE UP
EOSINOPHIL # BLD AUTO: 0.08 K/UL — SIGNIFICANT CHANGE UP (ref 0–0.7)
EOSINOPHIL NFR BLD AUTO: 0.9 % — SIGNIFICANT CHANGE UP (ref 0–8)
ERYTHROCYTE [SEDIMENTATION RATE] IN BLOOD: 12 MM/HR — SIGNIFICANT CHANGE UP (ref 0–20)
GLUCOSE SERPL-MCNC: 83 MG/DL — SIGNIFICANT CHANGE UP (ref 70–99)
HCG SERPL QL: NEGATIVE — SIGNIFICANT CHANGE UP
HCT VFR BLD CALC: 46 % — SIGNIFICANT CHANGE UP (ref 37–47)
HGB BLD-MCNC: 15.4 G/DL — SIGNIFICANT CHANGE UP (ref 12–16)
IMM GRANULOCYTES NFR BLD AUTO: 0.3 % — SIGNIFICANT CHANGE UP (ref 0.1–0.3)
INR BLD: 1.06 RATIO — SIGNIFICANT CHANGE UP (ref 0.65–1.3)
LACTATE SERPL-SCNC: 0.6 MMOL/L — LOW (ref 0.7–2)
LYMPHOCYTES # BLD AUTO: 1.96 K/UL — SIGNIFICANT CHANGE UP (ref 1.2–3.4)
LYMPHOCYTES # BLD AUTO: 21.5 % — SIGNIFICANT CHANGE UP (ref 20.5–51.1)
MCHC RBC-ENTMCNC: 29.2 PG — SIGNIFICANT CHANGE UP (ref 27–31)
MCHC RBC-ENTMCNC: 33.5 G/DL — SIGNIFICANT CHANGE UP (ref 32–37)
MCV RBC AUTO: 87.1 FL — SIGNIFICANT CHANGE UP (ref 81–99)
MONOCYTES # BLD AUTO: 0.73 K/UL — HIGH (ref 0.1–0.6)
MONOCYTES NFR BLD AUTO: 8 % — SIGNIFICANT CHANGE UP (ref 1.7–9.3)
NEUTROPHILS # BLD AUTO: 6.28 K/UL — SIGNIFICANT CHANGE UP (ref 1.4–6.5)
NEUTROPHILS NFR BLD AUTO: 69 % — SIGNIFICANT CHANGE UP (ref 42.2–75.2)
NRBC # BLD: 0 /100 WBCS — SIGNIFICANT CHANGE UP (ref 0–0)
PLATELET # BLD AUTO: 220 K/UL — SIGNIFICANT CHANGE UP (ref 130–400)
POTASSIUM SERPL-MCNC: 4.6 MMOL/L — SIGNIFICANT CHANGE UP (ref 3.5–5)
POTASSIUM SERPL-SCNC: 4.6 MMOL/L — SIGNIFICANT CHANGE UP (ref 3.5–5)
PROT SERPL-MCNC: 7.4 G/DL — SIGNIFICANT CHANGE UP (ref 6–8)
PROTHROM AB SERPL-ACNC: 12.2 SEC — SIGNIFICANT CHANGE UP (ref 9.95–12.87)
RBC # BLD: 5.28 M/UL — SIGNIFICANT CHANGE UP (ref 4.2–5.4)
RBC # FLD: 12.8 % — SIGNIFICANT CHANGE UP (ref 11.5–14.5)
SARS-COV-2 RNA SPEC QL NAA+PROBE: SIGNIFICANT CHANGE UP
SODIUM SERPL-SCNC: 137 MMOL/L — SIGNIFICANT CHANGE UP (ref 135–146)
WBC # BLD: 9.11 K/UL — SIGNIFICANT CHANGE UP (ref 4.8–10.8)
WBC # FLD AUTO: 9.11 K/UL — SIGNIFICANT CHANGE UP (ref 4.8–10.8)

## 2022-05-05 PROCEDURE — 99285 EMERGENCY DEPT VISIT HI MDM: CPT

## 2022-05-05 PROCEDURE — 73590 X-RAY EXAM OF LOWER LEG: CPT | Mod: 26,LT

## 2022-05-05 RX ORDER — ACETAMINOPHEN 500 MG
650 TABLET ORAL EVERY 6 HOURS
Refills: 0 | Status: DISCONTINUED | OUTPATIENT
Start: 2022-05-05 | End: 2022-05-15

## 2022-05-05 RX ORDER — ONDANSETRON 8 MG/1
4 TABLET, FILM COATED ORAL EVERY 8 HOURS
Refills: 0 | Status: DISCONTINUED | OUTPATIENT
Start: 2022-05-05 | End: 2022-05-10

## 2022-05-05 RX ORDER — VANCOMYCIN HCL 1 G
1000 VIAL (EA) INTRAVENOUS ONCE
Refills: 0 | Status: COMPLETED | OUTPATIENT
Start: 2022-05-05 | End: 2022-05-05

## 2022-05-05 RX ORDER — SODIUM CHLORIDE 9 MG/ML
1000 INJECTION, SOLUTION INTRAVENOUS
Refills: 0 | Status: DISCONTINUED | OUTPATIENT
Start: 2022-05-05 | End: 2022-05-06

## 2022-05-05 RX ORDER — AZTREONAM 2 G
2000 VIAL (EA) INJECTION ONCE
Refills: 0 | Status: COMPLETED | OUTPATIENT
Start: 2022-05-05 | End: 2022-05-05

## 2022-05-05 RX ORDER — LANOLIN ALCOHOL/MO/W.PET/CERES
3 CREAM (GRAM) TOPICAL AT BEDTIME
Refills: 0 | Status: DISCONTINUED | OUTPATIENT
Start: 2022-05-05 | End: 2022-05-15

## 2022-05-05 RX ORDER — METRONIDAZOLE 500 MG
500 TABLET ORAL ONCE
Refills: 0 | Status: COMPLETED | OUTPATIENT
Start: 2022-05-05 | End: 2022-05-05

## 2022-05-05 RX ORDER — OXYCODONE AND ACETAMINOPHEN 5; 325 MG/1; MG/1
1 TABLET ORAL ONCE
Refills: 0 | Status: DISCONTINUED | OUTPATIENT
Start: 2022-05-05 | End: 2022-05-05

## 2022-05-05 RX ADMIN — Medication 100 MILLIGRAM(S): at 18:10

## 2022-05-05 RX ADMIN — Medication 250 MILLIGRAM(S): at 18:07

## 2022-05-05 RX ADMIN — OXYCODONE AND ACETAMINOPHEN 1 TABLET(S): 5; 325 TABLET ORAL at 21:27

## 2022-05-05 RX ADMIN — Medication 100 MILLIGRAM(S): at 17:19

## 2022-05-05 NOTE — H&P ADULT - HISTORY OF PRESENT ILLNESS
51 year old woman with PMH of SLE  lupus with lupus anticoagulant, antiphospholipid syndrome,  rheumatoid arthritis, cervical cancer sp hysterectomy,  laryngeal cancer sp CT, RT, CHF s/p AICD+PPM, DVT/PE in 2015 on Fondaparinux, NSTEMI in 2015, subclavian stenosis s/p stent placement, HTN, HLD, PPM/AICD presented to the hospital for a wound on the calf of the left lower extremity. pt has had this wound since last week with swelling of the calf, erythema, fever chills. She's been taking Augmentin for the last 6 days but has been worsening with black eschar. pt went to Dr Guan who performed a duplex, was negative  in the ED,pt's VS T(C): 36.8 (05-05-22 @ 14:33), Max: 36.8 (05-05-22 @ 14:33)  HR: 87 (05-05-22 @ 15:58) (87 - 114)  BP: 138/67 (05-05-22 @ 15:58) (138/67 - 142/91)  RR: 18 (05-05-22 @ 14:33) (18 - 18)  SpO2: 96% (05-05-22 @ 14:33) (96% - 96%), labs done were unremarkable. Duplex done as outpatient was negative.   pt received aztreonam, metronidazole, Vancomycin.   pt is admitted for workup/management

## 2022-05-05 NOTE — ED ADULT NURSE NOTE - OBJECTIVE STATEMENT
Pt a&ox3, in ED for pain, swelling and redness to left calf, localized darkened circular region spreading to surrounding calf, pain rated 10/10 during palpitations, pt states she has spiked a fever, no N/V/D, was sent to ED to get IV antibiotics, no SOB, unlabored breathing, equal rise & fall, able to speak in full sentences, denies CP, PMH of LUPUS on medications.

## 2022-05-05 NOTE — ED PROVIDER NOTE - CLINICAL SUMMARY MEDICAL DECISION MAKING FREE TEXT BOX
51 year old woman with PMH of lupus with lupus anticoagulant, rheumatoid arthritis, cervical cancer sp hysterectomy,  laryngeal cancer sp CT, RT, CHF s/p AICD+PPM, DVT/PE in 2015 on Xarelto, NSTEMI in 2015, subclavian stenosis s/p stent placement, HTN, HLD, PPM/AICD presented to the hospital for a wound on the calf of the left lower extremity. Pt has had this wound since last week with swelling of the calf, erythema, fever/chills. She's been taking Augmentin for the last 6 days but has been worsening with black eschar. Pt went to Dr Guan who performed a duplex, was negative. On exam, pt in NAD, AAOx3, head NC/AT, CN II-XII intact, lungs CTA B/L, CV S1S2 regular, abdomen soft/NT/ND/(+)BS, ext (+) would to left calf with necrotic center with surrounding erythema, TTP over would and the whole calf muscle, motor 5/5x4, sensation intact. Labs/XR done and reviewed. Abx started. Will admit for cellulitis, ?MRSA.

## 2022-05-05 NOTE — ED PROVIDER NOTE - OBJECTIVE STATEMENT
51 year old woman with PMH of lupus with lupus anticoagulant, rheumatoid arthritis, cervical cancer sp hysterectomy,  laryngeal cancer sp CT, RT, CHF s/p AICD+PPM, DVT/PE in 2015 on Xarelto ( Fondapurinax) , NSTEMI in 2015, subclavian stenosis s/p stent placement, recent diagnosed LUE thrombosis ( 03/2022) HTN, HLD, who presents with wound on her LLE. + associated w/daily  fevers at home ( Tmax 101) Pt reports wound sounded at a small spot on her leg and has been getting larger despite outpatient Augmentin 875mg BID by her PMD. She reports wound is very painful to touch and her entire leg is so painful it is hard to walk. Pt was also sent for Left Lower duplex study which was negative ( pt has results at bedside). Denies any nausea, vomiting, no hx of MRSA infection . No IVDU . 51 year old woman with PMH of lupus with lupus anticoagulant, rheumatoid arthritis, cervical cancer sp hysterectomy,  laryngeal cancer sp CT, RT, CHF s/p AICD+PPM, DVT/PE in 2015 on Fondapurinax,  NSTEMI in 2015, subclavian stenosis s/p stent placement, recent diagnosed LUE thrombosis ( 03/2022) HTN, HLD, who presents with wound on her LLE. + associated w/daily  fevers at home ( Tmax 101) Pt reports wound sounded at a small spot on her leg and has been getting larger despite outpatient Augmentin 875mg BID by her PMD. She reports wound is very painful to touch and her entire leg is so painful it is hard to walk. Pt was also sent for Left Lower duplex study which was negative ( pt has results at bedside). Denies any nausea, vomiting, no hx of MRSA infection . No IVDU .

## 2022-05-05 NOTE — ED PROVIDER NOTE - PHYSICAL EXAMINATION
CONSTITUTIONAL: Well-developed; well-nourished; in no acute distress.   SKIN: warm, dry  HEAD: Normocephalic; atraumatic.  EYES: PERRL, EOMI, normal sclera and conjunctiva   ENT: No nasal discharge; airway clear.  NECK: Supple; non tender.  CARD:  Regular rate and rhythm.   RESP: NO inc WOB , speaking in full sentences, lungs CTAB  ABD: soft ntnd  EXT:  LLE :wound of shin. + necrotic center, w/ surrounding erythema. , exquisite TTP of wound and surrounding area   NEURO: Alert, oriented, grossly unremarkable  PSYCH: Cooperative, appropriate.

## 2022-05-05 NOTE — ED PROVIDER NOTE - ATTENDING CONTRIBUTION TO CARE
51 year old woman with PMH of lupus with lupus anticoagulant, rheumatoid arthritis, cervical cancer sp hysterectomy,  laryngeal cancer sp CT, RT, CHF s/p AICD+PPM, DVT/PE in 2015 on Xarelto, NSTEMI in 2015, subclavian stenosis s/p stent placement, HTN, HLD, PPM/AICD 51 year old woman with PMH of lupus with lupus anticoagulant, rheumatoid arthritis, cervical cancer sp hysterectomy,  laryngeal cancer sp CT, RT, CHF s/p AICD+PPM, DVT/PE in 2015 on Xarelto, NSTEMI in 2015, subclavian stenosis s/p stent placement, HTN, HLD, PPM/AICD presented to the hospital for a wound on the calf of the left lower extremity. Pt has had this wound since last week with swelling of the calf, erythema, fever/chills. She's been taking Augmentin for the last 6 days but has been worsening with black eschar. Pt went to Dr Guan who performed a duplex, was negative. On exam, pt in NAD, AAOx3, head NC/AT, CN II-XII intact, lungs CTA B/L, CV S1S2 regular, abdomen soft/NT/ND/(+)BS, ext (+) would to left calf with necrotic center with surrounding erythema, TTP over would and the whole calf muscle, motor 5/5x4, sensation intact. Labs/XR done and reviewed. Abx started. Will admit for cellulitis, ?MRSA.

## 2022-05-05 NOTE — H&P ADULT - NSHPLABSRESULTS_GEN_ALL_CORE
15.4   9.11  )-----------( 220      ( 05 May 2022 17:08 )             46.0       05-05    137  |  100  |  15  ----------------------------<  83  4.6   |  23  |  1.0    Ca    10.2<H>      05 May 2022 17:08    TPro  7.4  /  Alb  4.7  /  TBili  0.4  /  DBili  x   /  AST  17  /  ALT  8   /  AlkPhos  81  05-05                  PT/INR - ( 05 May 2022 17:08 )   PT: 12.20 sec;   INR: 1.06 ratio         PTT - ( 05 May 2022 17:08 )  PTT:40.9 sec    Lactate Trend  05-05 @ 17:08 Lactate:0.6             CAPILLARY BLOOD GLUCOSE

## 2022-05-05 NOTE — H&P ADULT - NSHPPHYSICALEXAM_GEN_ALL_CORE
T(C): 36.8 (05-05-22 @ 14:33), Max: 36.8 (05-05-22 @ 14:33)  HR: 87 (05-05-22 @ 15:58) (87 - 114)  BP: 138/67 (05-05-22 @ 15:58) (138/67 - 142/91)  RR: 18 (05-05-22 @ 14:33) (18 - 18)  SpO2: 96% (05-05-22 @ 14:33) (96% - 96%)    PHYSICAL EXAM:  GENERAL: NAD, well-developed  HEAD:  Atraumatic, Normocephalic  EYES: EOMI, PERRLA, conjunctiva and sclera clear  ENT:No nasal obstruction or discharge. No tonsillar exudate, swelling or erythema.  NECK: Supple, No JVD  CHEST/LUNG: Clear to auscultation bilaterally; No wheeze  HEART: Regular rate and rhythm; No murmurs, rubs, or gallops  ABDOMEN: Soft, Nontender, Nondistended; Bowel sounds present  EXTREMITIES:  2+ Peripheral Pulses, No clubbing, cyanosis, or edema  PSYCH: AAOx3  NEUROLOGY: non-focal  SKIN: presence of black eschar on LLE.

## 2022-05-05 NOTE — H&P ADULT - ATTENDING COMMENTS
51 year old woman with PMH of SLE  lupus with lupus anticoagulant, antiphospholipid syndrome,  rheumatoid arthritis, cervical cancer sp hysterectomy,  laryngeal cancer s/p CT, RT, CHF s/p AICD+PPM, DVT/PE in 2015 on Fondaparinux, NSTEMI in 2015, subclavian stenosis s/p stent placement, HTN, HLD,  presented to the hospital for a wound on the calf of the left lower extremity x2-3wks. pt has had this wound x2-3wks with swelling of the calf, erythema, fever chills. She's been taking Augmentin for the last 6 days but has been worsening with black eschar. pt went to Dr Guan who performed a duplex, was negative and sent her to ED for IV ABx.    Denies CP, SOB, N/V/D/C/AP, cough, F, chills, dizziness, new focal weakness, HA, vision changes, dysuria, or urinary symptoms, blood in stool.    ROS: all systems unremarkable except as above.     General: NAD. Looks stated age.  HEENT: clean oropharynx, PERRLA EOMI, no LAD  Neck: trachea midline, no thyromegaly  CV: S1 S2; no m/r/g  Resp: diffuse wheeze b/l  GI: NT/ND/S +BS  MS: no clubbing/cyanosis/edema, +pulses  Neuro: motor, sensory intact; + reflexes  Skin: Lt post calf black eschar. +Swelling, tenderness. No significant erythema  Psychiatric: AA0x3 w/ intact insight and judgement    EKG - nonspecific changes (my read)  Chart and consultant notes personally reviewed.  Care Discussed with Consultants/Other Providers/ Housestaff [ x] YES [ ] NO   Radiology, labs, old records personally reviewed.    #LLE wound.  - labs done were unremarkable. Duplex done as outpatient was negative.   - pt received aztreonam, metronidazole, Vancomycin.   - will cont on Clindamycin 600 q8 and IV Vanco  - fu esr, crp, blood  - check Vanco trough before 4th dose  - fu Duplex arterial  - FU Burn evaluation for possible I&D     #History of VTE/ PE on fondaparinux  - continue Fondaparinux for now.   -reports resolution of LUE edema    #History of long-QT syndrome and VT sp AICD  #HFpEF  - EF 63% 2019  - EP on board  - s/p AICD placement (s/p laser lead extraction of ICD leads and device and reimplanted with new RA/RV lead and new ICD device ([employer]) in 2021)    #SLE/RA  #APLAS  - not in flare  - on Fondaparinux  -outpt rheum f/u    #COPD  - inhalers and nebs for COPD   -counselled pt to avoid smoking and secondary exposure    #Chronic pain syndrome  -I-Stop checked and meds ordered    #anxiety  -Xanax PRN    #MISC  DVT ppx: Fondaparinux  GI PPX not needed  DIET dash  Code status: full code    #Progress Note Handoff  Pending: Consults____Clinical improvement and stability__x___Resolution of cellulitis_  Pt/Family discussion: Pt/ informed and agree with the current plan  Disposition: Home__    My note supersedes the residents note should a discrepancy arise.    Chart and notes personally reviewed.  Care Discussed with Consultants/Other Providers/ Housestaff [ x] YES [ ] NO   Radiology, labs, old records personally reviewed.    discussed w/ housestaff, nursing, case management

## 2022-05-05 NOTE — H&P ADULT - ASSESSMENT
51 year old woman with PMH of SLE  lupus with lupus anticoagulant, antiphospholipid syndrome,  rheumatoid arthritis, cervical cancer sp hysterectomy,  laryngeal cancer sp CT, RT, CHF s/p AICD+PPM, DVT/PE in 2015 on Fondaparinux, NSTEMI in 2015, subclavian stenosis s/p stent placement, HTN, HLD, PPM/AICD presented to the hospital for a wound on the calf of the left lower extremity. pt has had this wound since last week with swelling of the calf, erythema, fever chills. She's been taking Augmentin for the last 6 days but has been worsening with black eschar. pt went to Dr Guan who performed a duplex, was negative    #LLE wound:   #Fever  #Cellulitis  - wound on the left calf, with overlying black eschar.  - labs done were unremarkable. Duplex done as outpatient was negative.   - pt received aztreonam, metronidazole, Vancomycin.   - will start on Clindamycin 900 q8.   - fu esr, crp, blood  - fu ID consult.   - FU Burn evaluation.     #History of VTE/ PE on fondaparinux  - continue Fondaparinux for now.   - pt was supposed to get a repeat CT chest with IV contrast to ro PE 6 weeks after March  - will order repeat CT chest    #History of long-QT syndrome and VT sp AICD  #HFpEF  - EF 63% 2019  - EP on board  - s/p AICD placement (s/p laser lead extraction of ICD leads and device and reimplanted with new RA/RV lead and new ICD device (Medtronic) in 2021)    #SLE  #APLAS  - not in flare  - on Fondaparinux      #RA  - Outpatient f/u      #COPD  - not in exacerbation  - Continue inhalers and nebs for COPD      #MISC  DVT ppx: Fondaparinux  GI PPX not needed  DIET dash  Code status: full code   51 year old woman with PMH of SLE  lupus with lupus anticoagulant, antiphospholipid syndrome,  rheumatoid arthritis, cervical cancer sp hysterectomy,  laryngeal cancer sp CT, RT, CHF s/p AICD+PPM, DVT/PE in 2015 on Fondaparinux, NSTEMI in 2015, subclavian stenosis s/p stent placement, HTN, HLD, PPM/AICD presented to the hospital for a wound on the calf of the left lower extremity. pt has had this wound since last week with swelling of the calf, erythema, fever chills. She's been taking Augmentin for the last 6 days but has been worsening with black eschar. pt went to Dr Guan who performed a duplex, was negative    #LLE wound:   #Fever  #Cellulitis  - wound on the left calf, with overlying black eschar.  - labs done were unremarkable. Duplex done as outpatient was negative.   - pt received aztreonam, metronidazole, Vancomycin.   - will start on Clindamycin 900 q8.   - fu esr, crp, blood  - fu ID consult.   - fu Duplex arterial  - FU Burn evaluation.     #History of VTE/ PE on fondaparinux  - continue Fondaparinux for now.   - pt was supposed to get a repeat CT chest with IV contrast to ro PE 6 weeks after March  - will order repeat CT chest    #History of long-QT syndrome and VT sp AICD  #HFpEF  - EF 63% 2019  - EP on board  - s/p AICD placement (s/p laser lead extraction of ICD leads and device and reimplanted with new RA/RV lead and new ICD device (Medtronic) in 2021)    #SLE  #APLAS  - not in flare  - on Fondaparinux      #RA  - Outpatient f/u      #COPD  - not in exacerbation  - Continue inhalers and nebs for COPD      #MISC  DVT ppx: Fondaparinux  GI PPX not needed  DIET dash  Code status: full code

## 2022-05-05 NOTE — ED PROVIDER NOTE - NS ED ROS FT
Constitutional: (+) fever  Eyes/ENT: (-) blurry vision, (-) epistaxis  Cardiovascular: (-) chest pain, (-) syncope  Respiratory: (-) cough, (-) shortness of breath  Gastrointestinal: (-) vomiting, (-) diarrhea  Musculoskeletal: (-) neck pain, (-) back pain,   Integumentary: (-) rash, (-) edema  Neurological: (-) headache, (-) altered mental status  Psychiatric: (-) hallucinations  Allergic/Immunologic: (-) pruritus

## 2022-05-06 LAB
A1C WITH ESTIMATED AVERAGE GLUCOSE RESULT: 5.4 % — SIGNIFICANT CHANGE UP (ref 4–5.6)
ALBUMIN SERPL ELPH-MCNC: 4.8 G/DL — SIGNIFICANT CHANGE UP (ref 3.5–5.2)
ALP SERPL-CCNC: 81 U/L — SIGNIFICANT CHANGE UP (ref 30–115)
ALT FLD-CCNC: 8 U/L — SIGNIFICANT CHANGE UP (ref 0–41)
ANION GAP SERPL CALC-SCNC: 15 MMOL/L — HIGH (ref 7–14)
APTT BLD: 62.2 SEC — HIGH (ref 27–39.2)
AST SERPL-CCNC: 14 U/L — SIGNIFICANT CHANGE UP (ref 0–41)
BASOPHILS # BLD AUTO: 0.04 K/UL — SIGNIFICANT CHANGE UP (ref 0–0.2)
BASOPHILS NFR BLD AUTO: 0.6 % — SIGNIFICANT CHANGE UP (ref 0–1)
BILIRUB SERPL-MCNC: 0.5 MG/DL — SIGNIFICANT CHANGE UP (ref 0.2–1.2)
BUN SERPL-MCNC: 15 MG/DL — SIGNIFICANT CHANGE UP (ref 10–20)
CALCIUM SERPL-MCNC: 9.8 MG/DL — SIGNIFICANT CHANGE UP (ref 8.5–10.1)
CHLORIDE SERPL-SCNC: 99 MMOL/L — SIGNIFICANT CHANGE UP (ref 98–110)
CHOLEST SERPL-MCNC: 192 MG/DL — SIGNIFICANT CHANGE UP
CO2 SERPL-SCNC: 23 MMOL/L — SIGNIFICANT CHANGE UP (ref 17–32)
CREAT SERPL-MCNC: 1 MG/DL — SIGNIFICANT CHANGE UP (ref 0.7–1.5)
CRP SERPL-MCNC: 14 MG/L — HIGH
EGFR: 68 ML/MIN/1.73M2 — SIGNIFICANT CHANGE UP
EOSINOPHIL # BLD AUTO: 0.23 K/UL — SIGNIFICANT CHANGE UP (ref 0–0.7)
EOSINOPHIL NFR BLD AUTO: 3.7 % — SIGNIFICANT CHANGE UP (ref 0–8)
ERYTHROCYTE [SEDIMENTATION RATE] IN BLOOD: 11 MM/HR — SIGNIFICANT CHANGE UP (ref 0–20)
ESTIMATED AVERAGE GLUCOSE: 108 MG/DL — SIGNIFICANT CHANGE UP (ref 68–114)
GLUCOSE SERPL-MCNC: 103 MG/DL — HIGH (ref 70–99)
HCT VFR BLD CALC: 45.7 % — SIGNIFICANT CHANGE UP (ref 37–47)
HDLC SERPL-MCNC: 60 MG/DL — SIGNIFICANT CHANGE UP
HGB BLD-MCNC: 15.3 G/DL — SIGNIFICANT CHANGE UP (ref 12–16)
IMM GRANULOCYTES NFR BLD AUTO: 0.3 % — SIGNIFICANT CHANGE UP (ref 0.1–0.3)
INR BLD: 1.05 RATIO — SIGNIFICANT CHANGE UP (ref 0.65–1.3)
LIPID PNL WITH DIRECT LDL SERPL: 119 MG/DL — HIGH
LYMPHOCYTES # BLD AUTO: 1.95 K/UL — SIGNIFICANT CHANGE UP (ref 1.2–3.4)
LYMPHOCYTES # BLD AUTO: 31.3 % — SIGNIFICANT CHANGE UP (ref 20.5–51.1)
MCHC RBC-ENTMCNC: 29.2 PG — SIGNIFICANT CHANGE UP (ref 27–31)
MCHC RBC-ENTMCNC: 33.5 G/DL — SIGNIFICANT CHANGE UP (ref 32–37)
MCV RBC AUTO: 87.2 FL — SIGNIFICANT CHANGE UP (ref 81–99)
MONOCYTES # BLD AUTO: 0.52 K/UL — SIGNIFICANT CHANGE UP (ref 0.1–0.6)
MONOCYTES NFR BLD AUTO: 8.3 % — SIGNIFICANT CHANGE UP (ref 1.7–9.3)
NEUTROPHILS # BLD AUTO: 3.47 K/UL — SIGNIFICANT CHANGE UP (ref 1.4–6.5)
NEUTROPHILS NFR BLD AUTO: 55.8 % — SIGNIFICANT CHANGE UP (ref 42.2–75.2)
NON HDL CHOLESTEROL: 132 MG/DL — HIGH
NRBC # BLD: 0 /100 WBCS — SIGNIFICANT CHANGE UP (ref 0–0)
PLATELET # BLD AUTO: 197 K/UL — SIGNIFICANT CHANGE UP (ref 130–400)
POTASSIUM SERPL-MCNC: 4 MMOL/L — SIGNIFICANT CHANGE UP (ref 3.5–5)
POTASSIUM SERPL-SCNC: 4 MMOL/L — SIGNIFICANT CHANGE UP (ref 3.5–5)
PROT SERPL-MCNC: 7.5 G/DL — SIGNIFICANT CHANGE UP (ref 6–8)
PROTHROM AB SERPL-ACNC: 12.1 SEC — SIGNIFICANT CHANGE UP (ref 9.95–12.87)
RBC # BLD: 5.24 M/UL — SIGNIFICANT CHANGE UP (ref 4.2–5.4)
RBC # FLD: 12.9 % — SIGNIFICANT CHANGE UP (ref 11.5–14.5)
SODIUM SERPL-SCNC: 137 MMOL/L — SIGNIFICANT CHANGE UP (ref 135–146)
TRIGL SERPL-MCNC: 65 MG/DL — SIGNIFICANT CHANGE UP
WBC # BLD: 6.23 K/UL — SIGNIFICANT CHANGE UP (ref 4.8–10.8)
WBC # FLD AUTO: 6.23 K/UL — SIGNIFICANT CHANGE UP (ref 4.8–10.8)

## 2022-05-06 PROCEDURE — 99223 1ST HOSP IP/OBS HIGH 75: CPT

## 2022-05-06 PROCEDURE — 93925 LOWER EXTREMITY STUDY: CPT | Mod: 26

## 2022-05-06 RX ORDER — FOLIC ACID 0.8 MG
1 TABLET ORAL DAILY
Refills: 0 | Status: DISCONTINUED | OUTPATIENT
Start: 2022-05-06 | End: 2022-05-15

## 2022-05-06 RX ORDER — FONDAPARINUX SODIUM 2.5 MG/.5ML
7.5 INJECTION, SOLUTION SUBCUTANEOUS DAILY
Refills: 0 | Status: DISCONTINUED | OUTPATIENT
Start: 2022-05-06 | End: 2022-05-12

## 2022-05-06 RX ORDER — OXYCODONE AND ACETAMINOPHEN 5; 325 MG/1; MG/1
1 TABLET ORAL ONCE
Refills: 0 | Status: DISCONTINUED | OUTPATIENT
Start: 2022-05-06 | End: 2022-05-06

## 2022-05-06 RX ORDER — MORPHINE SULFATE 50 MG/1
120 CAPSULE, EXTENDED RELEASE ORAL
Refills: 0 | Status: DISCONTINUED | OUTPATIENT
Start: 2022-05-06 | End: 2022-05-13

## 2022-05-06 RX ORDER — ALPRAZOLAM 0.25 MG
1 TABLET ORAL EVERY 6 HOURS
Refills: 0 | Status: DISCONTINUED | OUTPATIENT
Start: 2022-05-06 | End: 2022-05-13

## 2022-05-06 RX ORDER — IPRATROPIUM/ALBUTEROL SULFATE 18-103MCG
3 AEROSOL WITH ADAPTER (GRAM) INHALATION EVERY 6 HOURS
Refills: 0 | Status: DISCONTINUED | OUTPATIENT
Start: 2022-05-06 | End: 2022-05-15

## 2022-05-06 RX ORDER — FONDAPARINUX SODIUM 2.5 MG/.5ML
7.5 INJECTION, SOLUTION SUBCUTANEOUS DAILY
Refills: 0 | Status: DISCONTINUED | OUTPATIENT
Start: 2022-05-06 | End: 2022-05-06

## 2022-05-06 RX ORDER — PREGABALIN 225 MG/1
1000 CAPSULE ORAL DAILY
Refills: 0 | Status: DISCONTINUED | OUTPATIENT
Start: 2022-05-06 | End: 2022-05-15

## 2022-05-06 RX ORDER — VANCOMYCIN HCL 1 G
1000 VIAL (EA) INTRAVENOUS ONCE
Refills: 0 | Status: COMPLETED | OUTPATIENT
Start: 2022-05-06 | End: 2022-05-06

## 2022-05-06 RX ORDER — MORPHINE SULFATE 50 MG/1
2 CAPSULE, EXTENDED RELEASE ORAL ONCE
Refills: 0 | Status: DISCONTINUED | OUTPATIENT
Start: 2022-05-06 | End: 2022-05-06

## 2022-05-06 RX ORDER — VANCOMYCIN HCL 1 G
VIAL (EA) INTRAVENOUS
Refills: 0 | Status: DISCONTINUED | OUTPATIENT
Start: 2022-05-06 | End: 2022-05-07

## 2022-05-06 RX ORDER — OXYCODONE AND ACETAMINOPHEN 5; 325 MG/1; MG/1
1 TABLET ORAL EVERY 6 HOURS
Refills: 0 | Status: DISCONTINUED | OUTPATIENT
Start: 2022-05-06 | End: 2022-05-06

## 2022-05-06 RX ORDER — BUDESONIDE AND FORMOTEROL FUMARATE DIHYDRATE 160; 4.5 UG/1; UG/1
2 AEROSOL RESPIRATORY (INHALATION)
Refills: 0 | Status: DISCONTINUED | OUTPATIENT
Start: 2022-05-06 | End: 2022-05-15

## 2022-05-06 RX ORDER — ASPIRIN/CALCIUM CARB/MAGNESIUM 324 MG
81 TABLET ORAL DAILY
Refills: 0 | Status: DISCONTINUED | OUTPATIENT
Start: 2022-05-06 | End: 2022-05-15

## 2022-05-06 RX ORDER — MORPHINE SULFATE 50 MG/1
60 CAPSULE, EXTENDED RELEASE ORAL
Refills: 0 | Status: DISCONTINUED | OUTPATIENT
Start: 2022-05-06 | End: 2022-05-06

## 2022-05-06 RX ORDER — VANCOMYCIN HCL 1 G
1000 VIAL (EA) INTRAVENOUS EVERY 12 HOURS
Refills: 0 | Status: DISCONTINUED | OUTPATIENT
Start: 2022-05-07 | End: 2022-05-07

## 2022-05-06 RX ORDER — METOPROLOL TARTRATE 50 MG
100 TABLET ORAL DAILY
Refills: 0 | Status: DISCONTINUED | OUTPATIENT
Start: 2022-05-06 | End: 2022-05-15

## 2022-05-06 RX ORDER — HYDRALAZINE HCL 50 MG
50 TABLET ORAL
Refills: 0 | Status: DISCONTINUED | OUTPATIENT
Start: 2022-05-06 | End: 2022-05-15

## 2022-05-06 RX ORDER — ACETAMINOPHEN 500 MG
650 TABLET ORAL EVERY 6 HOURS
Refills: 0 | Status: DISCONTINUED | OUTPATIENT
Start: 2022-05-06 | End: 2022-05-06

## 2022-05-06 RX ADMIN — Medication 100 MILLIGRAM(S): at 21:29

## 2022-05-06 RX ADMIN — FONDAPARINUX SODIUM 7.5 MILLIGRAM(S): 2.5 INJECTION, SOLUTION SUBCUTANEOUS at 10:53

## 2022-05-06 RX ADMIN — MORPHINE SULFATE 120 MILLIGRAM(S): 50 CAPSULE, EXTENDED RELEASE ORAL at 21:29

## 2022-05-06 RX ADMIN — Medication 50 MILLIGRAM(S): at 17:26

## 2022-05-06 RX ADMIN — Medication 100 MILLIGRAM(S): at 01:00

## 2022-05-06 RX ADMIN — Medication 250 MILLIGRAM(S): at 17:55

## 2022-05-06 RX ADMIN — Medication 1 MILLIGRAM(S): at 15:05

## 2022-05-06 RX ADMIN — MORPHINE SULFATE 2 MILLIGRAM(S): 50 CAPSULE, EXTENDED RELEASE ORAL at 05:50

## 2022-05-06 RX ADMIN — Medication 1 MILLIGRAM(S): at 22:11

## 2022-05-06 RX ADMIN — Medication 100 MILLIGRAM(S): at 12:09

## 2022-05-06 RX ADMIN — PREGABALIN 1000 MICROGRAM(S): 225 CAPSULE ORAL at 17:55

## 2022-05-06 RX ADMIN — OXYCODONE AND ACETAMINOPHEN 1 TABLET(S): 5; 325 TABLET ORAL at 00:35

## 2022-05-06 RX ADMIN — Medication 100 MILLIGRAM(S): at 15:12

## 2022-05-06 RX ADMIN — MORPHINE SULFATE 60 MILLIGRAM(S): 50 CAPSULE, EXTENDED RELEASE ORAL at 12:10

## 2022-05-06 NOTE — PROGRESS NOTE ADULT - ASSESSMENT
51 year old woman with PMH of SLE  lupus with lupus anticoagulant, antiphospholipid syndrome,  rheumatoid arthritis, cervical cancer sp hysterectomy,  laryngeal cancer sp CT, RT, CHF s/p AICD+PPM, DVT/PE in 2015 on Fondaparinux, NSTEMI in 2015, subclavian stenosis s/p stent placement, HTN, HLD, PPM/AICD presented to the hospital for a wound on the calf of the left lower extremity. pt has had this wound since last week with swelling of the calf, erythema, fever chills. She's been taking Augmentin for the last 6 days but has been worsening with black eschar. pt went to Dr Guan who performed a duplex, was negative    #LLE wound:   #Fever  #Cellulitis  - wound on the left calf, with overlying black eschar.  - labs done were unremarkable. Duplex done as outpatient was negative.   - pt received aztreonam, metronidazole, Vancomycin.   - will start on Clindamycin 900 q8.   - fu esr, crp, blood  - fu ID consult.   - fu Duplex arterial  - FU Burn evaluation.     #History of VTE/ PE on fondaparinux  - continue Fondaparinux for now.   - pt was supposed to get a repeat CT chest with IV contrast to ro PE 6 weeks after March  - will order repeat CT chest    #History of long-QT syndrome and VT sp AICD  #HFpEF  - EF 63% 2019  - EP on board  - s/p AICD placement (s/p laser lead extraction of ICD leads and device and reimplanted with new RA/RV lead and new ICD device (Medtronic) in 2021)    #SLE  #APLAS  - not in flare  - on Fondaparinux      #RA  - Outpatient f/u      #COPD  - not in exacerbation  - Continue inhalers and nebs for COPD      #MISC  DVT ppx: Fondaparinux  GI PPX not needed  DIET dash  Code status: full code         51 year old woman with PMH of SLE  lupus with lupus anticoagulant, antiphospholipid syndrome,  rheumatoid arthritis, cervical cancer sp hysterectomy,  laryngeal cancer sp CT, RT, CHF s/p AICD+PPM, DVT/PE in 2015 on Fondaparinux, NSTEMI in 2015, subclavian stenosis s/p stent placement, HTN, HLD, PPM/AICD presented to the hospital for a wound on the calf of the left lower extremity. pt has had this wound since last week with swelling of the calf, erythema, fever chills. She's been taking Augmentin for the last 6 days but has been worsening with black eschar. pt went to Dr Guan who performed a duplex, was negative    #LLE wound:   #Fever  #Cellulitis  - wound on the left calf, with overlying black eschar.  - labs done were unremarkable. Duplex done as outpatient was negative.   - pt received aztreonam, metronidazole, Vancomycin.   - will start on Clindamycin 900 q8.   - fu esr, crp, blood  - follow up ID consult:   - fu Duplex arterial  - FU Burn evaluation.     #Pain control   - resumed home pain medication    #History of VTE/ PE on fondaparinux  - continue Fondaparinux for now.   - pt was supposed to get a repeat CT chest with IV contrast to ro PE 6 weeks after March  - will order repeat CT chest    #History of long-QT syndrome and VT sp AICD  #HFpEF  - EF 63% 2019  - EP on board  - s/p AICD placement (s/p laser lead extraction of ICD leads and device and reimplanted with new RA/RV lead and new ICD device (Medtronic) in 2021)    #SLE  #APLAS  - not in flare  - on Fondaparinux    #RA  - Outpatient f/u      #COPD  - not in exacerbation  - Continue inhalers and nebs for COPD      #MISC  DVT ppx: Fondaparinux  GI PPX not needed  DIET dash  Code status: full code

## 2022-05-06 NOTE — CONSULT NOTE ADULT - ASSESSMENT
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 pt has had this wound since last week with swelling of the calf, erythema, fever chills. She's been taking Augmentin for the last 6 days but has been worsening with black eschar.     IMPRESSION  #  < from: Xray Tibia + Fibula 2 Views, Left (05.05.22 @ 18:01) >No acute displaced fracture.  #Sepsis ruled out on admission   #SLE  lupus with lupus anticoagulant, antiphospholipid syndrome,  rheumatoid arthritis  #HF s/p AICD+PPM  #Abx allergy:  Vantin (Other (Mild))  avelox    Creatinine, Serum: 1.0 (05-06-22 @ 07:53)    Weight (kg): 71.2 (05-05-22 @ 14:33)    RECOMMENDATIONS  This is an incomplete consult note. All final recommendations to follow after interview and examination of the patient. Please follow recommendations noted below.    If any questions, please call or send a message on [employer] Teams  Please continue to update ID with any pertinent new laboratory or radiographic findings  Spectra [id number]     ASSESSMENT  51 year old woman with PMH of SLE  lupus with lupus anticoagulant, antiphospholipid syndrome,  rheumatoid arthritis, cervical cancer sp hysterectomy,  laryngeal cancer sp CT, RT, CHF s/p AICD+PPM, DVT/PE in 2015 on Fondaparinux, NSTEMI in 2015, subclavian stenosis s/p stent placement, HTN, HLD, PPM/AICD presented to the hospital for a wound on the calf of the left lower extremity. pt has had this wound since last week with swelling of the calf, erythema, fever chills. She's been taking Augmentin for the last 6 days but has been worsening with black eschar.     IMPRESSION  #LLE abscess    denies spider/tick bites    reports hx MRSA  < from: Xray Tibia + Fibula 2 Views, Left (05.05.22 @ 18:01) >No acute displaced fracture.  #Sepsis ruled out on admission   #SLE  lupus with lupus anticoagulant, antiphospholipid syndrome,  rheumatoid arthritis  #HF s/p AICD+PPM  #Abx allergy:  Vantin (Other (Mild))- unknown  avelox- heart issues    Creatinine, Serum: 1.0 (05-06-22 @ 07:53)    Weight (kg): 71.2 (05-05-22 @ 14:33)    RECOMMENDATIONS  - Clinda decrease to 600mg q8h IV  - ADD Ceftriaxone 1g q24h IV  - MRSA nares  - Warm compresses  - Discussed that may need I&D, pt hesitant, Consult Burn if not improving  - BCX    If any questions, please call or send a message on [employer] Teams  Please continue to update ID with any pertinent new laboratory or radiographic findings  Spectra [id number]

## 2022-05-06 NOTE — CONSULT NOTE ADULT - SUBJECTIVE AND OBJECTIVE BOX
ILEANA MCNEIL  51y, Female  Allergy: adhesives (Rash; Urticaria)  Arava (Anaphylaxis; Angioedema)  Avelox (Other)  Plaquenil (Other)  Vantin (Other (Mild))      CHIEF COMPLAINT:   left lower extremity wound (06 May 2022 05:52)      LOS  1d    HPI  HPI:  51 year old woman with PMH of SLE  lupus with lupus anticoagulant, antiphospholipid syndrome,  rheumatoid arthritis, cervical cancer sp hysterectomy,  laryngeal cancer sp CT, RT, CHF s/p AICD+PPM, DVT/PE in 2015 on Fondaparinux, NSTEMI in 2015, subclavian stenosis s/p stent placement, HTN, HLD, PPM/AICD presented to the hospital for a wound on the calf of the left lower extremity. pt has had this wound since last week with swelling of the calf, erythema, fever chills. She's been taking Augmentin for the last 6 days but has been worsening with black eschar. pt went to Dr Guan who performed a duplex, was negative  in the ED,pt's VS T(C): 36.8 (05-05-22 @ 14:33), Max: 36.8 (05-05-22 @ 14:33)  HR: 87 (05-05-22 @ 15:58) (87 - 114)  BP: 138/67 (05-05-22 @ 15:58) (138/67 - 142/91)  RR: 18 (05-05-22 @ 14:33) (18 - 18)  SpO2: 96% (05-05-22 @ 14:33) (96% - 96%), labs done were unremarkable. Duplex done as outpatient was negative.   pt received aztreonam, metronidazole, Vancomycin.   pt is admitted for workup/management (05 May 2022 20:56)      INFECTIOUS DISEASE HISTORY:  ID consulted for LLE wound  was on po augmentin as outpatient   Afebrile  No leukocytosis  xray no osteomyelitis         PMH  PAST MEDICAL & SURGICAL HISTORY:  Lupus    RA (rheumatoid arthritis)    HTN (hypertension)    CHF (congestive heart failure)    COPD (chronic obstructive pulmonary disease)    DVT (deep venous thrombosis)    Pulmonary embolism    History of laryngeal cancer    GERD (gastroesophageal reflux disease)    Cervical cancer    S/P appendectomy    S/P cholecystectomy    AICD (automatic cardioverter/defibrillator) present    S/P hysterectomy    History of back surgery    H/O foot surgery    S/P eye surgery    Subclavian arterial stenosis        FAMILY HISTORY  Family history of cervical cancer    FH: thyroid cancer        SOCIAL HISTORY  Social History:  pt stilll actively smokes, no drinking no alcohol. (05 May 2022 20:56)        ROS  ***    VITALS:  T(F): 96.6, Max: 98.3 (05-05-22 @ 14:33)  HR: 76  BP: 161/72  RR: 18Vital Signs Last 24 Hrs  T(C): 35.9 (06 May 2022 03:45), Max: 36.8 (05 May 2022 14:33)  T(F): 96.6 (06 May 2022 03:45), Max: 98.3 (05 May 2022 14:33)  HR: 76 (06 May 2022 03:45) (76 - 114)  BP: 161/72 (06 May 2022 03:45) (138/67 - 161/72)  BP(mean): --  RR: 18 (06 May 2022 03:45) (18 - 18)  SpO2: 96% (06 May 2022 03:45) (96% - 96%)    PHYSICAL EXAM:  ***    TESTS & MEASUREMENTS:                        15.3   6.23  )-----------( 197      ( 06 May 2022 07:53 )             45.7     05-06    137  |  99  |  15  ----------------------------<  103<H>  4.0   |  23  |  1.0    Ca    9.8      06 May 2022 07:53    TPro  7.5  /  Alb  4.8  /  TBili  0.5  /  DBili  x   /  AST  14  /  ALT  8   /  AlkPhos  81  05-06      LIVER FUNCTIONS - ( 06 May 2022 07:53 )  Alb: 4.8 g/dL / Pro: 7.5 g/dL / ALK PHOS: 81 U/L / ALT: 8 U/L / AST: 14 U/L / GGT: x                 Lactate, Blood: 0.6 mmol/L (05-05-22 @ 17:08)      INFECTIOUS DISEASES TESTING  COVID-19 PCR: NotDetec (05-05-22 @ 18:42)  COVID-19 PCR: NotDetec (03-23-22 @ 20:23)      INFLAMMATORY MARKERS  Sedimentation Rate, Erythrocyte: 12 mm/Hr (05-05-22 @ 17:08)      RADIOLOGY & ADDITIONAL TESTS:  I have personally reviewed the last Chest xray  CXR      CT      CARDIOLOGY TESTING      MEDICATIONS  clindamycin IVPB 900 IV Intermittent every 8 hours  lactated ringers. 1000 IV Continuous <Continuous>        ANTIBIOTICS:  clindamycin IVPB 900 milliGRAM(s) IV Intermittent every 8 hours      ALLERGIES:  adhesives (Rash; Urticaria)  Arava (Anaphylaxis; Angioedema)  Avelox (Other)  Plaquenil (Other)  Vantin (Other (Mild))       ILEANA MCNEIL  51y, Female  Allergy: adhesives (Rash; Urticaria)  Arava (Anaphylaxis; Angioedema)  Avelox (Other)  Plaquenil (Other)  Vantin (Other (Mild))      CHIEF COMPLAINT:   left lower extremity wound (06 May 2022 05:52)      LOS  1d    HPI  HPI:  51 year old woman with PMH of SLE  lupus with lupus anticoagulant, antiphospholipid syndrome,  rheumatoid arthritis, cervical cancer sp hysterectomy,  laryngeal cancer sp CT, RT, CHF s/p AICD+PPM, DVT/PE in 2015 on Fondaparinux, NSTEMI in 2015, subclavian stenosis s/p stent placement, HTN, HLD, PPM/AICD presented to the hospital for a wound on the calf of the left lower extremity. pt has had this wound since last week with swelling of the calf, erythema, fever chills. She's been taking Augmentin for the last 6 days but has been worsening with black eschar. pt went to Dr Guan who performed a duplex, was negative  in the ED,pt's VS T(C): 36.8 (05-05-22 @ 14:33), Max: 36.8 (05-05-22 @ 14:33)  HR: 87 (05-05-22 @ 15:58) (87 - 114)  BP: 138/67 (05-05-22 @ 15:58) (138/67 - 142/91)  RR: 18 (05-05-22 @ 14:33) (18 - 18)  SpO2: 96% (05-05-22 @ 14:33) (96% - 96%), labs done were unremarkable. Duplex done as outpatient was negative.   pt received aztreonam, metronidazole, Vancomycin.   pt is admitted for workup/management (05 May 2022 20:56)      INFECTIOUS DISEASE HISTORY:  ID consulted for LLE wound  was on po augmentin as outpatient   Afebrile  No leukocytosis  xray no osteomyelitis   denies spider/tick bites  +hx MRSA        PMH  PAST MEDICAL & SURGICAL HISTORY:  Lupus    RA (rheumatoid arthritis)    HTN (hypertension)    CHF (congestive heart failure)    COPD (chronic obstructive pulmonary disease)    DVT (deep venous thrombosis)    Pulmonary embolism    History of laryngeal cancer    GERD (gastroesophageal reflux disease)    Cervical cancer    S/P appendectomy    S/P cholecystectomy    AICD (automatic cardioverter/defibrillator) present    S/P hysterectomy    History of back surgery    H/O foot surgery    S/P eye surgery    Subclavian arterial stenosis        FAMILY HISTORY  Family history of cervical cancer    FH: thyroid cancer        SOCIAL HISTORY  Social History:  pt stilll actively smokes, no drinking no alcohol. (05 May 2022 20:56)        ROS  General: Denies rigors, nightsweats  HEENT: Denies headache, rhinorrhea, sore throat, eye pain  CV: Denies CP, palpitations  PULM: Denies wheezing, hemoptysis  GI: Denies hematemesis, hematochezia, melena  : Denies discharge, hematuria  MSK: Denies arthralgias, myalgias  SKIN: as noted above   NEURO: Denies paresthesias, weakness  PSYCH: Denies depression, anxiety     VITALS:  T(F): 96.6, Max: 98.3 (05-05-22 @ 14:33)  HR: 76  BP: 161/72  RR: 18Vital Signs Last 24 Hrs  T(C): 35.9 (06 May 2022 03:45), Max: 36.8 (05 May 2022 14:33)  T(F): 96.6 (06 May 2022 03:45), Max: 98.3 (05 May 2022 14:33)  HR: 76 (06 May 2022 03:45) (76 - 114)  BP: 161/72 (06 May 2022 03:45) (138/67 - 161/72)  BP(mean): --  RR: 18 (06 May 2022 03:45) (18 - 18)  SpO2: 96% (06 May 2022 03:45) (96% - 96%)    PHYSICAL EXAM:  Gen: NAD, resting in bed  HEENT: Normocephalic, atraumatic  Neck: supple, no lymphadenopathy  CV: Regular rate & regular rhythm  Lungs: decreased BS at bases, no fremitus  Abdomen: Soft, BS present  Ext: Warm, well perfused, L medial calf with circular pustule and surrounding erythema  Neuro: non focal, awake  Skin: no rash, no erythema  Lines: no phlebitis     TESTS & MEASUREMENTS:                        15.3   6.23  )-----------( 197      ( 06 May 2022 07:53 )             45.7     05-06    137  |  99  |  15  ----------------------------<  103<H>  4.0   |  23  |  1.0    Ca    9.8      06 May 2022 07:53    TPro  7.5  /  Alb  4.8  /  TBili  0.5  /  DBili  x   /  AST  14  /  ALT  8   /  AlkPhos  81  05-06      LIVER FUNCTIONS - ( 06 May 2022 07:53 )  Alb: 4.8 g/dL / Pro: 7.5 g/dL / ALK PHOS: 81 U/L / ALT: 8 U/L / AST: 14 U/L / GGT: x                 Lactate, Blood: 0.6 mmol/L (05-05-22 @ 17:08)      INFECTIOUS DISEASES TESTING  COVID-19 PCR: NotDetec (05-05-22 @ 18:42)  COVID-19 PCR: NotDetec (03-23-22 @ 20:23)      INFLAMMATORY MARKERS  Sedimentation Rate, Erythrocyte: 12 mm/Hr (05-05-22 @ 17:08)      RADIOLOGY & ADDITIONAL TESTS:  I have personally reviewed the last Chest xray  CXR      CT      CARDIOLOGY TESTING      MEDICATIONS  clindamycin IVPB 900 IV Intermittent every 8 hours  lactated ringers. 1000 IV Continuous <Continuous>        ANTIBIOTICS:  clindamycin IVPB 900 milliGRAM(s) IV Intermittent every 8 hours      ALLERGIES:  adhesives (Rash; Urticaria)  Arava (Anaphylaxis; Angioedema)  Avelox (Other)  Plaquenil (Other)  Vantin (Other (Mild))

## 2022-05-06 NOTE — PROGRESS NOTE ADULT - SUBJECTIVE AND OBJECTIVE BOX
Hospital Day:  1d    Subjective:    No acute events overnight.    Chart reviewed, patient seen and examined at bedside in AM. Patient appears comfortable while at rest in bed. No other complaints.     Denies headaches, changes in vision, chest pains, SOB, n/v/d, abd pain, constipation, changes in urination, pain on urination, weakness, fatigue, joint pain or muscle pain.       Past Medical Hx:   Lupus    AICD (automatic cardioverter/defibrillator) present    Throat cancer    RA (rheumatoid arthritis)    HTN (hypertension)    CHF (congestive heart failure)    COPD (chronic obstructive pulmonary disease)    DVT (deep venous thrombosis)    Pulmonary embolus    Pulmonary embolism    History of laryngeal cancer    GERD (gastroesophageal reflux disease)    Cervical cancer      Past Sx:  S/P appendectomy    S/P cholecystectomy    History of laryngeal cancer    AICD (automatic cardioverter/defibrillator) present    S/P hysterectomy    History of back surgery    H/O foot surgery    S/P eye surgery    Subclavian arterial stenosis      Allergies:  adhesives (Rash; Urticaria)  Arava (Anaphylaxis; Angioedema)  Avelox (Other)  Plaquenil (Other)  Vantin (Other (Mild))    Current Meds:   Standng Meds:  clindamycin IVPB 900 milliGRAM(s) IV Intermittent every 8 hours  lactated ringers. 1000 milliLiter(s) (75 mL/Hr) IV Continuous <Continuous>  morphine  - Injectable 2 milliGRAM(s) IV Push once    PRN Meds:  acetaminophen     Tablet .. 650 milliGRAM(s) Oral every 6 hours PRN Temp greater or equal to 38C (100.4F), Mild Pain (1 - 3)  aluminum hydroxide/magnesium hydroxide/simethicone Suspension 30 milliLiter(s) Oral every 4 hours PRN Dyspepsia  melatonin 3 milliGRAM(s) Oral at bedtime PRN Insomnia  ondansetron Injectable 4 milliGRAM(s) IV Push every 8 hours PRN Nausea and/or Vomiting    HOME MEDICATIONS:  albuterol 90 mcg/inh inhalation aerosol: 2 puff(s) inhaled every 6 hours  ALPRAZolam 1 mg oral tablet: 2 tab(s) orally 3 times a day, As Needed  Benlysta: 200 milligram(s) intravenous once a week  cyanocobalamin 1000 mcg/mL injectable solution: 1 milliliter(s) injectable once a week  folic acid 1 mg oral tablet: 1 tab(s) orally once a day  fondaparinux 7.5 mg/0.6 mL subcutaneous solution: 0.6 milliliter(s) subcutaneous once a day  hydrALAZINE 50 mg oral tablet: 1 tab(s) orally 2 times a day  Metoprolol Succinate  mg oral tablet, extended release: 1 tab(s) orally once a day  MS Contin 60 mg oral tablet, extended release: 2 tab(s) orally 3 times a day (after meals)  Reglan 10 mg oral tablet: 1 tab(s) orally 4 times a day (before meals and at bedtime)  Trelegy Ellipta 100 mcg-62.5 mcg-25 mcg/inh inhalation powder: 1 puff(s) inhaled once a day      Vital Signs:   T(F): 96.6 (05-06-22 @ 03:45), Max: 98.3 (05-05-22 @ 14:33)  HR: 76 (05-06-22 @ 03:45) (76 - 114)  BP: 161/72 (05-06-22 @ 03:45) (138/67 - 161/72)  RR: 18 (05-06-22 @ 03:45) (18 - 18)  SpO2: 96% (05-06-22 @ 03:45) (96% - 96%)        Physical Exam:   CONSTITUTIONAL: Well-developed; well-nourished; in no acute distress, speaking in full sentences  HEAD: Normocephalic; atraumatic  EYES: PERRL, EOMI, no conjunctival erythema  NECK: Supple; non tender, FROM  CARD: +S1, S2 Regular rate and rhythm.  RESP: CTABL  ABD: soft nontender, nondistended, no rebound, no guarding, no rigidity  EXT: moves all extremities,  No clubbing, cyanosis or edema.   NEURO: Alert, oriented, grossly unremarkable, no focal deficits, cn ii-xii grossly intact  PSYCH: Cooperative, appropriate         Labs:                         15.4   9.11  )-----------( 220      ( 05 May 2022 17:08 )             46.0     Neutophil% 69.0, Lymphocyte% 21.5, Monocyte% 8.0, Bands% 0.3 05-05-22 @ 17:08    05 May 2022 17:08    137    |  100    |  15     ----------------------------<  83     4.6     |  23     |  1.0      Ca    10.2       05 May 2022 17:08    TPro  7.4    /  Alb  4.7    /  TBili  0.4    /  DBili  x      /  AST  17     /  ALT  8      /  AlkPhos  81     05 May 2022 17:08       pTT    40.9             ----< 1.06 INR  (05-05-22 @ 17:08)    12.20        PT                        =============  PT/INR - ( 05 May 2022 17:08 )   PT: 12.20 sec;   INR: 1.06 ratio         PTT - ( 05 May 2022 17:08 )  PTT:40.9 sec  CAPILLARY BLOOD GLUCOSE            LIVER FUNCTIONS - ( 05 May 2022 17:08 )  Alb: 4.7 g/dL / Pro: 7.4 g/dL / ALK PHOS: 81 U/L / ALT: 8 U/L / AST: 17 U/L / GGT: x               Radiology:   IMAGING:             Hospital Day:  1d    Subjective:    No acute events overnight.    Chart reviewed, patient seen and examined at bedside in AM. Patient appears comfortable while at rest in bed. No other complaints.     Denies headaches, changes in vision, chest pains, SOB, n/v/d, abd pain, constipation, changes in urination, pain on urination, weakness, fatigue, joint pain or muscle pain.       Past Medical Hx:   Lupus    AICD (automatic cardioverter/defibrillator) present    Throat cancer    RA (rheumatoid arthritis)    HTN (hypertension)    CHF (congestive heart failure)    COPD (chronic obstructive pulmonary disease)    DVT (deep venous thrombosis)    Pulmonary embolus    Pulmonary embolism    History of laryngeal cancer    GERD (gastroesophageal reflux disease)    Cervical cancer      Past Sx:  S/P appendectomy    S/P cholecystectomy    History of laryngeal cancer    AICD (automatic cardioverter/defibrillator) present    S/P hysterectomy    History of back surgery    H/O foot surgery    S/P eye surgery    Subclavian arterial stenosis      Allergies:  adhesives (Rash; Urticaria)  Arava (Anaphylaxis; Angioedema)  Avelox (Other)  Plaquenil (Other)  Vantin (Other (Mild))    Current Meds:   Standng Meds:  clindamycin IVPB 900 milliGRAM(s) IV Intermittent every 8 hours  lactated ringers. 1000 milliLiter(s) (75 mL/Hr) IV Continuous <Continuous>  morphine  - Injectable 2 milliGRAM(s) IV Push once    PRN Meds:  acetaminophen     Tablet .. 650 milliGRAM(s) Oral every 6 hours PRN Temp greater or equal to 38C (100.4F), Mild Pain (1 - 3)  aluminum hydroxide/magnesium hydroxide/simethicone Suspension 30 milliLiter(s) Oral every 4 hours PRN Dyspepsia  melatonin 3 milliGRAM(s) Oral at bedtime PRN Insomnia  ondansetron Injectable 4 milliGRAM(s) IV Push every 8 hours PRN Nausea and/or Vomiting    HOME MEDICATIONS:  albuterol 90 mcg/inh inhalation aerosol: 2 puff(s) inhaled every 6 hours  ALPRAZolam 1 mg oral tablet: 2 tab(s) orally 3 times a day, As Needed  Benlysta: 200 milligram(s) intravenous once a week  cyanocobalamin 1000 mcg/mL injectable solution: 1 milliliter(s) injectable once a week  folic acid 1 mg oral tablet: 1 tab(s) orally once a day  fondaparinux 7.5 mg/0.6 mL subcutaneous solution: 0.6 milliliter(s) subcutaneous once a day  hydrALAZINE 50 mg oral tablet: 1 tab(s) orally 2 times a day  Metoprolol Succinate  mg oral tablet, extended release: 1 tab(s) orally once a day  MS Contin 60 mg oral tablet, extended release: 2 tab(s) orally 3 times a day (after meals)  Reglan 10 mg oral tablet: 1 tab(s) orally 4 times a day (before meals and at bedtime)  Trelegy Ellipta 100 mcg-62.5 mcg-25 mcg/inh inhalation powder: 1 puff(s) inhaled once a day      Vital Signs:   T(F): 96.6 (05-06-22 @ 03:45), Max: 98.3 (05-05-22 @ 14:33)  HR: 76 (05-06-22 @ 03:45) (76 - 114)  BP: 161/72 (05-06-22 @ 03:45) (138/67 - 161/72)  RR: 18 (05-06-22 @ 03:45) (18 - 18)  SpO2: 96% (05-06-22 @ 03:45) (96% - 96%)        Physical Exam:   GENERAL: NAD, well-developed  HEAD:  Atraumatic, Normocephalic  EYES: EOMI, PERRLA, conjunctiva and sclera clear  ENT:No nasal obstruction or discharge. No tonsillar exudate, swelling or erythema.  NECK: Supple, No JVD  CHEST/LUNG: Clear to auscultation bilaterally; No wheeze  HEART: Regular rate and rhythm; No murmurs, rubs, or gallops  ABDOMEN: Soft, Nontender, Nondistended; Bowel sounds present  EXTREMITIES:  2+ Peripheral Pulses, No clubbing, cyanosis, or edema  PSYCH: AAOx3  NEUROLOGY: non-focal  SKIN: presence of black eschar on LLE.      Labs:                         15.4   9.11  )-----------( 220      ( 05 May 2022 17:08 )             46.0     Neutophil% 69.0, Lymphocyte% 21.5, Monocyte% 8.0, Bands% 0.3 05-05-22 @ 17:08    05 May 2022 17:08    137    |  100    |  15     ----------------------------<  83     4.6     |  23     |  1.0      Ca    10.2       05 May 2022 17:08    TPro  7.4    /  Alb  4.7    /  TBili  0.4    /  DBili  x      /  AST  17     /  ALT  8      /  AlkPhos  81     05 May 2022 17:08       pTT    40.9             ----< 1.06 INR  (05-05-22 @ 17:08)    12.20        PT                        =============  PT/INR - ( 05 May 2022 17:08 )   PT: 12.20 sec;   INR: 1.06 ratio         PTT - ( 05 May 2022 17:08 )  PTT:40.9 sec  CAPILLARY BLOOD GLUCOSE            LIVER FUNCTIONS - ( 05 May 2022 17:08 )  Alb: 4.7 g/dL / Pro: 7.4 g/dL / ALK PHOS: 81 U/L / ALT: 8 U/L / AST: 17 U/L / GGT: x               Radiology:   IMAGING:             Hospital Day:  1d    Subjective:    No acute events overnight.    Chart reviewed, patient seen and examined at bedside in AM. Patient appears comfortable while at rest in bed. No other complaints.       Past Medical Hx:   Lupus    AICD (automatic cardioverter/defibrillator) present    Throat cancer    RA (rheumatoid arthritis)    HTN (hypertension)    CHF (congestive heart failure)    COPD (chronic obstructive pulmonary disease)    DVT (deep venous thrombosis)    Pulmonary embolus    Pulmonary embolism    History of laryngeal cancer    GERD (gastroesophageal reflux disease)    Cervical cancer      Past Sx:  S/P appendectomy    S/P cholecystectomy    History of laryngeal cancer    AICD (automatic cardioverter/defibrillator) present    S/P hysterectomy    History of back surgery    H/O foot surgery    S/P eye surgery    Subclavian arterial stenosis      Allergies:  adhesives (Rash; Urticaria)  Arava (Anaphylaxis; Angioedema)  Avelox (Other)  Plaquenil (Other)  Vantin (Other (Mild))    Current Meds:   Standng Meds:  clindamycin IVPB 900 milliGRAM(s) IV Intermittent every 8 hours  lactated ringers. 1000 milliLiter(s) (75 mL/Hr) IV Continuous <Continuous>  morphine  - Injectable 2 milliGRAM(s) IV Push once    PRN Meds:  acetaminophen     Tablet .. 650 milliGRAM(s) Oral every 6 hours PRN Temp greater or equal to 38C (100.4F), Mild Pain (1 - 3)  aluminum hydroxide/magnesium hydroxide/simethicone Suspension 30 milliLiter(s) Oral every 4 hours PRN Dyspepsia  melatonin 3 milliGRAM(s) Oral at bedtime PRN Insomnia  ondansetron Injectable 4 milliGRAM(s) IV Push every 8 hours PRN Nausea and/or Vomiting    HOME MEDICATIONS:  albuterol 90 mcg/inh inhalation aerosol: 2 puff(s) inhaled every 6 hours  ALPRAZolam 1 mg oral tablet: 2 tab(s) orally 3 times a day, As Needed  Benlysta: 200 milligram(s) intravenous once a week  cyanocobalamin 1000 mcg/mL injectable solution: 1 milliliter(s) injectable once a week  folic acid 1 mg oral tablet: 1 tab(s) orally once a day  fondaparinux 7.5 mg/0.6 mL subcutaneous solution: 0.6 milliliter(s) subcutaneous once a day  hydrALAZINE 50 mg oral tablet: 1 tab(s) orally 2 times a day  Metoprolol Succinate  mg oral tablet, extended release: 1 tab(s) orally once a day  MS Contin 60 mg oral tablet, extended release: 2 tab(s) orally 3 times a day (after meals)  Reglan 10 mg oral tablet: 1 tab(s) orally 4 times a day (before meals and at bedtime)  Trelegy Ellipta 100 mcg-62.5 mcg-25 mcg/inh inhalation powder: 1 puff(s) inhaled once a day      Vital Signs:   T(F): 96.6 (05-06-22 @ 03:45), Max: 98.3 (05-05-22 @ 14:33)  HR: 76 (05-06-22 @ 03:45) (76 - 114)  BP: 161/72 (05-06-22 @ 03:45) (138/67 - 161/72)  RR: 18 (05-06-22 @ 03:45) (18 - 18)  SpO2: 96% (05-06-22 @ 03:45) (96% - 96%)        Physical Exam:   GENERAL: NAD, well-developed  HEAD:  Atraumatic, Normocephalic  EYES: EOMI, PERRLA, conjunctiva and sclera clear  ENT:No nasal obstruction or discharge. No tonsillar exudate, swelling or erythema.  NECK: Supple, No JVD  CHEST/LUNG: Clear to auscultation bilaterally; No wheeze  HEART: Regular rate and rhythm; No murmurs, rubs, or gallops  ABDOMEN: Soft, Nontender, Nondistended; Bowel sounds present  EXTREMITIES:  2+ Peripheral Pulses, No clubbing, cyanosis, or edema  PSYCH: AAOx3  NEUROLOGY: non-focal  SKIN: presence of black eschar on LLE.      Labs:                         15.4   9.11  )-----------( 220      ( 05 May 2022 17:08 )             46.0     Neutophil% 69.0, Lymphocyte% 21.5, Monocyte% 8.0, Bands% 0.3 05-05-22 @ 17:08    05 May 2022 17:08    137    |  100    |  15     ----------------------------<  83     4.6     |  23     |  1.0      Ca    10.2       05 May 2022 17:08    TPro  7.4    /  Alb  4.7    /  TBili  0.4    /  DBili  x      /  AST  17     /  ALT  8      /  AlkPhos  81     05 May 2022 17:08       pTT    40.9             ----< 1.06 INR  (05-05-22 @ 17:08)    12.20        PT                        =============  PT/INR - ( 05 May 2022 17:08 )   PT: 12.20 sec;   INR: 1.06 ratio         PTT - ( 05 May 2022 17:08 )  PTT:40.9 sec  CAPILLARY BLOOD GLUCOSE            LIVER FUNCTIONS - ( 05 May 2022 17:08 )  Alb: 4.7 g/dL / Pro: 7.4 g/dL / ALK PHOS: 81 U/L / ALT: 8 U/L / AST: 17 U/L / GGT: x               Radiology:   IMAGING:

## 2022-05-06 NOTE — PATIENT PROFILE ADULT - FALL HARM RISK - UNIVERSAL INTERVENTIONS
Bed in lowest position, wheels locked, appropriate side rails in place/Call bell, personal items and telephone in reach/Instruct patient to call for assistance before getting out of bed or chair/Non-slip footwear when patient is out of bed/[place] to call system/Physically safe environment - no spills, clutter or unnecessary equipment/Purposeful Proactive Rounding/Room/bathroom lighting operational, light cord in reach

## 2022-05-07 LAB
ALBUMIN SERPL ELPH-MCNC: 4.5 G/DL — SIGNIFICANT CHANGE UP (ref 3.5–5.2)
ALP SERPL-CCNC: 82 U/L — SIGNIFICANT CHANGE UP (ref 30–115)
ALT FLD-CCNC: 40 U/L — SIGNIFICANT CHANGE UP (ref 0–41)
ANION GAP SERPL CALC-SCNC: 16 MMOL/L — HIGH (ref 7–14)
AST SERPL-CCNC: 56 U/L — HIGH (ref 0–41)
BASOPHILS # BLD AUTO: 0.04 K/UL — SIGNIFICANT CHANGE UP (ref 0–0.2)
BASOPHILS NFR BLD AUTO: 0.6 % — SIGNIFICANT CHANGE UP (ref 0–1)
BILIRUB SERPL-MCNC: 0.6 MG/DL — SIGNIFICANT CHANGE UP (ref 0.2–1.2)
BUN SERPL-MCNC: 13 MG/DL — SIGNIFICANT CHANGE UP (ref 10–20)
CALCIUM SERPL-MCNC: 9.7 MG/DL — SIGNIFICANT CHANGE UP (ref 8.5–10.1)
CHLORIDE SERPL-SCNC: 96 MMOL/L — LOW (ref 98–110)
CO2 SERPL-SCNC: 23 MMOL/L — SIGNIFICANT CHANGE UP (ref 17–32)
CREAT SERPL-MCNC: 1 MG/DL — SIGNIFICANT CHANGE UP (ref 0.7–1.5)
EGFR: 68 ML/MIN/1.73M2 — SIGNIFICANT CHANGE UP
EOSINOPHIL # BLD AUTO: 0.39 K/UL — SIGNIFICANT CHANGE UP (ref 0–0.7)
EOSINOPHIL NFR BLD AUTO: 5.8 % — SIGNIFICANT CHANGE UP (ref 0–8)
GLUCOSE SERPL-MCNC: 140 MG/DL — HIGH (ref 70–99)
HCT VFR BLD CALC: 45.2 % — SIGNIFICANT CHANGE UP (ref 37–47)
HGB BLD-MCNC: 14.9 G/DL — SIGNIFICANT CHANGE UP (ref 12–16)
IMM GRANULOCYTES NFR BLD AUTO: 0.4 % — HIGH (ref 0.1–0.3)
LYMPHOCYTES # BLD AUTO: 2.01 K/UL — SIGNIFICANT CHANGE UP (ref 1.2–3.4)
LYMPHOCYTES # BLD AUTO: 30.1 % — SIGNIFICANT CHANGE UP (ref 20.5–51.1)
MAGNESIUM SERPL-MCNC: 1.7 MG/DL — LOW (ref 1.8–2.4)
MCHC RBC-ENTMCNC: 29 PG — SIGNIFICANT CHANGE UP (ref 27–31)
MCHC RBC-ENTMCNC: 33 G/DL — SIGNIFICANT CHANGE UP (ref 32–37)
MCV RBC AUTO: 88.1 FL — SIGNIFICANT CHANGE UP (ref 81–99)
MONOCYTES # BLD AUTO: 0.76 K/UL — HIGH (ref 0.1–0.6)
MONOCYTES NFR BLD AUTO: 11.4 % — HIGH (ref 1.7–9.3)
NEUTROPHILS # BLD AUTO: 3.44 K/UL — SIGNIFICANT CHANGE UP (ref 1.4–6.5)
NEUTROPHILS NFR BLD AUTO: 51.7 % — SIGNIFICANT CHANGE UP (ref 42.2–75.2)
NRBC # BLD: 0 /100 WBCS — SIGNIFICANT CHANGE UP (ref 0–0)
PHOSPHATE SERPL-MCNC: 4 MG/DL — SIGNIFICANT CHANGE UP (ref 2.1–4.9)
PLATELET # BLD AUTO: 208 K/UL — SIGNIFICANT CHANGE UP (ref 130–400)
POTASSIUM SERPL-MCNC: 4.3 MMOL/L — SIGNIFICANT CHANGE UP (ref 3.5–5)
POTASSIUM SERPL-SCNC: 4.3 MMOL/L — SIGNIFICANT CHANGE UP (ref 3.5–5)
PROT SERPL-MCNC: 7.3 G/DL — SIGNIFICANT CHANGE UP (ref 6–8)
RBC # BLD: 5.13 M/UL — SIGNIFICANT CHANGE UP (ref 4.2–5.4)
RBC # FLD: 12.8 % — SIGNIFICANT CHANGE UP (ref 11.5–14.5)
SODIUM SERPL-SCNC: 135 MMOL/L — SIGNIFICANT CHANGE UP (ref 135–146)
WBC # BLD: 6.67 K/UL — SIGNIFICANT CHANGE UP (ref 4.8–10.8)
WBC # FLD AUTO: 6.67 K/UL — SIGNIFICANT CHANGE UP (ref 4.8–10.8)

## 2022-05-07 PROCEDURE — 99232 SBSQ HOSP IP/OBS MODERATE 35: CPT

## 2022-05-07 PROCEDURE — 99233 SBSQ HOSP IP/OBS HIGH 50: CPT

## 2022-05-07 RX ADMIN — MORPHINE SULFATE 120 MILLIGRAM(S): 50 CAPSULE, EXTENDED RELEASE ORAL at 06:08

## 2022-05-07 RX ADMIN — Medication 100 MILLIGRAM(S): at 05:28

## 2022-05-07 RX ADMIN — Medication 50 MILLIGRAM(S): at 05:28

## 2022-05-07 RX ADMIN — MORPHINE SULFATE 120 MILLIGRAM(S): 50 CAPSULE, EXTENDED RELEASE ORAL at 20:28

## 2022-05-07 RX ADMIN — Medication 50 MILLIGRAM(S): at 17:12

## 2022-05-07 RX ADMIN — Medication 100 MILLIGRAM(S): at 21:19

## 2022-05-07 RX ADMIN — Medication 1 MILLIGRAM(S): at 06:23

## 2022-05-07 RX ADMIN — Medication 100 MILLIGRAM(S): at 14:08

## 2022-05-07 RX ADMIN — FONDAPARINUX SODIUM 7.5 MILLIGRAM(S): 2.5 INJECTION, SOLUTION SUBCUTANEOUS at 12:28

## 2022-05-07 RX ADMIN — Medication 81 MILLIGRAM(S): at 11:18

## 2022-05-07 RX ADMIN — Medication 1 MILLIGRAM(S): at 11:19

## 2022-05-07 RX ADMIN — Medication 250 MILLIGRAM(S): at 05:29

## 2022-05-07 RX ADMIN — Medication 1 APPLICATION(S): at 17:13

## 2022-05-07 RX ADMIN — Medication 1 MILLIGRAM(S): at 14:09

## 2022-05-07 RX ADMIN — MORPHINE SULFATE 120 MILLIGRAM(S): 50 CAPSULE, EXTENDED RELEASE ORAL at 14:09

## 2022-05-07 RX ADMIN — Medication 1 MILLIGRAM(S): at 20:28

## 2022-05-07 RX ADMIN — PREGABALIN 1000 MICROGRAM(S): 225 CAPSULE ORAL at 12:29

## 2022-05-07 NOTE — CONSULT NOTE ADULT - ASSESSMENT
ASSESSMENT:  LLE cellulitis    RECOMMENDATION:  Wound care - Silvadene/DPD BID  No Surgical debridement   IV abx as indicated/ per ID  Elevation

## 2022-05-07 NOTE — PROGRESS NOTE ADULT - SUBJECTIVE AND OBJECTIVE BOX
ILEANA MCNEIL  51y  Female      Patient is a 51y old  Female who presents with a chief complaint of left lower extremity wound (07 May 2022 13:36)      INTERVAL HPI/OVERNIGHT EVENTS:  She is still with pain and swelling on left leg.   Vital Signs Last 24 Hrs  T(C): 35.7 (07 May 2022 13:55), Max: 37.2 (06 May 2022 21:06)  T(F): 96.3 (07 May 2022 13:55), Max: 99 (06 May 2022 21:06)  HR: 73 (07 May 2022 13:55) (73 - 93)  BP: 123/59 (07 May 2022 13:55) (123/59 - 131/68)  BP(mean): --  RR: 18 (07 May 2022 05:47) (18 - 18)  SpO2: --      05-07-22 @ 07:01  -  05-07-22 @ 17:54  --------------------------------------------------------  IN: 0 mL / OUT: 2 mL / NET: -2 mL            Consultant(s) Notes Reviewed:  [x ] YES  [ ] NO          MEDICATIONS  (STANDING):  albuterol/ipratropium for Nebulization 3 milliLiter(s) Nebulizer every 6 hours  aspirin enteric coated 81 milliGRAM(s) Oral daily  budesonide 160 MICROgram(s)/formoterol 4.5 MICROgram(s) Inhaler 2 Puff(s) Inhalation two times a day  clindamycin IVPB 600 milliGRAM(s) IV Intermittent every 8 hours  cyanocobalamin 1000 MICROGram(s) Oral daily  folic acid 1 milliGRAM(s) Oral daily  fondaparinux Injectable 7.5 milliGRAM(s) SubCutaneous daily  hydrALAZINE 50 milliGRAM(s) Oral two times a day  metoprolol succinate  milliGRAM(s) Oral daily  morphine ER Tablet 120 milliGRAM(s) Oral <User Schedule>  silver sulfADIAZINE 1% Cream 1 Application(s) Topical two times a day    MEDICATIONS  (PRN):  acetaminophen     Tablet .. 650 milliGRAM(s) Oral every 6 hours PRN Temp greater or equal to 38C (100.4F), Mild Pain (1 - 3)  ALPRAZolam 1 milliGRAM(s) Oral every 6 hours PRN anxiety  aluminum hydroxide/magnesium hydroxide/simethicone Suspension 30 milliLiter(s) Oral every 4 hours PRN Dyspepsia  melatonin 3 milliGRAM(s) Oral at bedtime PRN Insomnia  ondansetron Injectable 4 milliGRAM(s) IV Push every 8 hours PRN Nausea and/or Vomiting      LABS                          14.9   6.67  )-----------( 208      ( 07 May 2022 08:08 )             45.2     05-07    135  |  96<L>  |  13  ----------------------------<  140<H>  4.3   |  23  |  1.0    Ca    9.7      07 May 2022 08:08  Phos  4.0     05-07  Mg     1.7     05-07    TPro  7.3  /  Alb  4.5  /  TBili  0.6  /  DBili  x   /  AST  56<H>  /  ALT  40  /  AlkPhos  82  05-07        PT/INR - ( 06 May 2022 07:53 )   PT: 12.10 sec;   INR: 1.05 ratio         PTT - ( 06 May 2022 07:53 )  PTT:62.2 sec  Lactate Trend  05-05 @ 17:08 Lactate:0.6         CAPILLARY BLOOD GLUCOSE          Culture - Blood (collected 05-06-22 @ 07:53)  Source: .Blood Blood  Preliminary Report (05-07-22 @ 15:06):    No growth to date.    Culture - Blood (collected 05-05-22 @ 17:13)  Source: .Blood Blood  Preliminary Report (05-06-22 @ 23:02):    No growth to date.    Culture - Blood (collected 05-05-22 @ 17:13)  Source: .Blood Blood  Preliminary Report (05-06-22 @ 23:02):    No growth to date.        RADIOLOGY & ADDITIONAL TESTS:    Imaging Personally Reviewed:  [ ] YES  [ ] NO    HEALTH ISSUES - PROBLEM Dx:          PHYSICAL EXAM:  GENERAL: NAD, well-developed.  HEAD:  Atraumatic, Normocephalic.  EYES: EOMI, PERRLA, conjunctiva and sclera clear.  NECK: Supple, No JVD.  CHEST/LUNG: Clear to auscultation bilaterally; No wheeze.  HEART: Regular rate and rhythm; S1 S2.   ABDOMEN: Soft, Nontender, Nondistended; Bowel sounds present.  EXTREMITIES: left lower calf medial small erythema with central elevation and open wound.   PSYCH: AAOx3.  NEUROLOGY: non-focal.  SKIN: No rashes or lesions.

## 2022-05-07 NOTE — PROGRESS NOTE ADULT - ASSESSMENT
51 year old woman with PMH of SLE  lupus with lupus anticoagulant, antiphospholipid syndrome,  rheumatoid arthritis, cervical cancer sp hysterectomy,  laryngeal cancer sp CT, RT, CHF s/p AICD+PPM, DVT/PE in 2015 on Fondaparinux, NSTEMI in 2015, subclavian stenosis s/p stent placement, HTN, HLD, PPM/AICD presented to the hospital for a wound on the calf of the left lower extremity. pt has had this wound since last week with swelling of the calf, erythema, fever chills. She's been taking Augmentin for the last 6 days but has been worsening with black eschar. pt went to Dr Guan who performed a duplex, was negative    A/P:   Left lower cellulitis with small abscess"  Erythema, tenderness with small collection on distal medial calf,   Blood cx no growth  Arterial Duplex was normal.   No fever, Continue Clindamycin IV 600mg q 8hrs. If collection is available will send wound cx.   Burn evaluated the     #Pain control   - resumed home pain medication    History of VTE/ PE  Antiphospholipid Antibody syndrome:   continue Fondaparinux , patient developed DVT while on Coumadin and Xarelto.     History of long-QT syndrome and VT sp AICD  s/p AICD placement (s/p laser lead extraction of ICD leads and device and reimplanted with new RA/RV lead and new ICD device ([employer]) in 2021)    Chronic HFpEF  Echo showed  EF 63% 2019    SLE:   RA    COPD   not in exacerbation  Continue inhalers and nebs for COPD    DVT ppx: Fondaparinux  GI PPX not needed  DIET dash  Code status: full code  #Progress Note Handoff:  Pending (specify):  improving cellulitis   Family discussion:  Disposition: Home in 24 hrs.

## 2022-05-07 NOTE — PROGRESS NOTE ADULT - SUBJECTIVE AND OBJECTIVE BOX
Hospital Day:  2d    Subjective:    No acute events overnight.    Chart reviewed, patient seen and examined at bedside in AM. Patient appears comfortable while at rest in bed. No other complaints.     Denies headaches, changes in vision, chest pains, SOB, n/v/d, abd pain, constipation, changes in urination, pain on urination, weakness, fatigue, joint pain or muscle pain.       Past Medical Hx:   Lupus    AICD (automatic cardioverter/defibrillator) present    Throat cancer    RA (rheumatoid arthritis)    HTN (hypertension)    CHF (congestive heart failure)    COPD (chronic obstructive pulmonary disease)    DVT (deep venous thrombosis)    Pulmonary embolus    Pulmonary embolism    History of laryngeal cancer    GERD (gastroesophageal reflux disease)    Cervical cancer      Past Sx:  S/P appendectomy    S/P cholecystectomy    History of laryngeal cancer    AICD (automatic cardioverter/defibrillator) present    S/P hysterectomy    History of back surgery    H/O foot surgery    S/P eye surgery    Subclavian arterial stenosis      Allergies:  adhesives (Rash; Urticaria)  Arava (Anaphylaxis; Angioedema)  Avelox (Other)  Plaquenil (Other)  Vantin (Other (Mild))    Current Meds:   Standng Meds:  albuterol/ipratropium for Nebulization 3 milliLiter(s) Nebulizer every 6 hours  aspirin enteric coated 81 milliGRAM(s) Oral daily  budesonide 160 MICROgram(s)/formoterol 4.5 MICROgram(s) Inhaler 2 Puff(s) Inhalation two times a day  clindamycin IVPB 900 milliGRAM(s) IV Intermittent every 8 hours  cyanocobalamin 1000 MICROGram(s) Oral daily  folic acid 1 milliGRAM(s) Oral daily  fondaparinux Injectable 7.5 milliGRAM(s) SubCutaneous daily  hydrALAZINE 50 milliGRAM(s) Oral two times a day  metoprolol succinate  milliGRAM(s) Oral daily  morphine ER Tablet 120 milliGRAM(s) Oral <User Schedule>  vancomycin  IVPB      vancomycin  IVPB 1000 milliGRAM(s) IV Intermittent every 12 hours    PRN Meds:  acetaminophen     Tablet .. 650 milliGRAM(s) Oral every 6 hours PRN Temp greater or equal to 38C (100.4F), Mild Pain (1 - 3)  ALPRAZolam 1 milliGRAM(s) Oral every 6 hours PRN anxiety  aluminum hydroxide/magnesium hydroxide/simethicone Suspension 30 milliLiter(s) Oral every 4 hours PRN Dyspepsia  melatonin 3 milliGRAM(s) Oral at bedtime PRN Insomnia  ondansetron Injectable 4 milliGRAM(s) IV Push every 8 hours PRN Nausea and/or Vomiting    HOME MEDICATIONS:  albuterol 90 mcg/inh inhalation aerosol: 2 puff(s) inhaled every 6 hours  ALPRAZolam 1 mg oral tablet: 2 tab(s) orally 3 times a day, As Needed  Benlysta: 200 milligram(s) intravenous once a week  cyanocobalamin 1000 mcg/mL injectable solution: 1 milliliter(s) injectable once a week  folic acid 1 mg oral tablet: 1 tab(s) orally once a day  fondaparinux 7.5 mg/0.6 mL subcutaneous solution: 0.6 milliliter(s) subcutaneous once a day  hydrALAZINE 50 mg oral tablet: 1 tab(s) orally 2 times a day  Metoprolol Succinate  mg oral tablet, extended release: 1 tab(s) orally once a day  MS Contin 60 mg oral tablet, extended release: 2 tab(s) orally 3 times a day (after meals)  Reglan 10 mg oral tablet: 1 tab(s) orally 4 times a day (before meals and at bedtime)  Trelegy Ellipta 100 mcg-62.5 mcg-25 mcg/inh inhalation powder: 1 puff(s) inhaled once a day      Vital Signs:   T(F): 96.8 (05-07-22 @ 05:47), Max: 99 (05-06-22 @ 21:06)  HR: 93 (05-07-22 @ 05:47) (78 - 93)  BP: 131/68 (05-07-22 @ 05:47) (124/71 - 131/68)  RR: 18 (05-07-22 @ 05:47) (18 - 18)  SpO2: --        Physical Exam:   CONSTITUTIONAL: Well-developed; well-nourished; in no acute distress, speaking in full sentences  HEAD: Normocephalic; atraumatic  EYES: PERRL, EOMI, no conjunctival erythema  NECK: Supple; non tender, FROM  CARD: +S1, S2 Regular rate and rhythm.  RESP: CTABL  ABD: soft nontender, nondistended, no rebound, no guarding, no rigidity  EXT: moves all extremities,  No clubbing, cyanosis or edema.   NEURO: Alert, oriented, grossly unremarkable, no focal deficits, cn ii-xii grossly intact  PSYCH: Cooperative, appropriate         Labs:                         15.3   6.23  )-----------( 197      ( 06 May 2022 07:53 )             45.7     Neutophil% 55.8, Lymphocyte% 31.3, Monocyte% 8.3, Bands% 0.3 05-06-22 @ 07:53    06 May 2022 07:53    137    |  99     |  15     ----------------------------<  103    4.0     |  23     |  1.0      Ca    9.8        06 May 2022 07:53    TPro  7.5    /  Alb  4.8    /  TBili  0.5    /  DBili  x      /  AST  14     /  ALT  8      /  AlkPhos  81     06 May 2022 07:53       pTT    62.2             ----< 1.05 INR  (05-06-22 @ 07:53)    12.10        PT  Chol 192, LDL --, HDL 60, VLDL --, TRG 65 05-06-22 @ 07:53                      Culture - Blood (collected 05-05-22 @ 17:13)  Source: .Blood Blood  Preliminary Report (05-06-22 @ 23:02):    No growth to date.    Culture - Blood (collected 05-05-22 @ 17:13)  Source: .Blood Blood  Preliminary Report (05-06-22 @ 23:02):    No growth to date.        =============  PT/INR - ( 06 May 2022 07:53 )   PT: 12.10 sec;   INR: 1.05 ratio         PTT - ( 06 May 2022 07:53 )  PTT:62.2 sec  CAPILLARY BLOOD GLUCOSE            LIVER FUNCTIONS - ( 06 May 2022 07:53 )  Alb: 4.8 g/dL / Pro: 7.5 g/dL / ALK PHOS: 81 U/L / ALT: 8 U/L / AST: 14 U/L / GGT: x             Culture - Blood (collected 05 May 2022 17:13)  Source: .Blood Blood  Preliminary Report (06 May 2022 23:02):    No growth to date.    Culture - Blood (collected 05 May 2022 17:13)  Source: .Blood Blood  Preliminary Report (06 May 2022 23:02):    No growth to date.        Radiology:   IMAGING:             Hospital Day:  2d    Subjective:    No acute events overnight.    Chart reviewed, patient seen and examined at bedside in AM. Patient appears comfortable while at rest in bed. No other complaints.         Past Medical Hx:   Lupus    AICD (automatic cardioverter/defibrillator) present    Throat cancer    RA (rheumatoid arthritis)    HTN (hypertension)    CHF (congestive heart failure)    COPD (chronic obstructive pulmonary disease)    DVT (deep venous thrombosis)    Pulmonary embolus    Pulmonary embolism    History of laryngeal cancer    GERD (gastroesophageal reflux disease)    Cervical cancer      Past Sx:  S/P appendectomy    S/P cholecystectomy    History of laryngeal cancer    AICD (automatic cardioverter/defibrillator) present    S/P hysterectomy    History of back surgery    H/O foot surgery    S/P eye surgery    Subclavian arterial stenosis      Allergies:  adhesives (Rash; Urticaria)  Arava (Anaphylaxis; Angioedema)  Avelox (Other)  Plaquenil (Other)  Vantin (Other (Mild))    Current Meds:   Standng Meds:  albuterol/ipratropium for Nebulization 3 milliLiter(s) Nebulizer every 6 hours  aspirin enteric coated 81 milliGRAM(s) Oral daily  budesonide 160 MICROgram(s)/formoterol 4.5 MICROgram(s) Inhaler 2 Puff(s) Inhalation two times a day  clindamycin IVPB 900 milliGRAM(s) IV Intermittent every 8 hours  cyanocobalamin 1000 MICROGram(s) Oral daily  folic acid 1 milliGRAM(s) Oral daily  fondaparinux Injectable 7.5 milliGRAM(s) SubCutaneous daily  hydrALAZINE 50 milliGRAM(s) Oral two times a day  metoprolol succinate  milliGRAM(s) Oral daily  morphine ER Tablet 120 milliGRAM(s) Oral <User Schedule>  vancomycin  IVPB      vancomycin  IVPB 1000 milliGRAM(s) IV Intermittent every 12 hours    PRN Meds:  acetaminophen     Tablet .. 650 milliGRAM(s) Oral every 6 hours PRN Temp greater or equal to 38C (100.4F), Mild Pain (1 - 3)  ALPRAZolam 1 milliGRAM(s) Oral every 6 hours PRN anxiety  aluminum hydroxide/magnesium hydroxide/simethicone Suspension 30 milliLiter(s) Oral every 4 hours PRN Dyspepsia  melatonin 3 milliGRAM(s) Oral at bedtime PRN Insomnia  ondansetron Injectable 4 milliGRAM(s) IV Push every 8 hours PRN Nausea and/or Vomiting    HOME MEDICATIONS:  albuterol 90 mcg/inh inhalation aerosol: 2 puff(s) inhaled every 6 hours  ALPRAZolam 1 mg oral tablet: 2 tab(s) orally 3 times a day, As Needed  Benlysta: 200 milligram(s) intravenous once a week  cyanocobalamin 1000 mcg/mL injectable solution: 1 milliliter(s) injectable once a week  folic acid 1 mg oral tablet: 1 tab(s) orally once a day  fondaparinux 7.5 mg/0.6 mL subcutaneous solution: 0.6 milliliter(s) subcutaneous once a day  hydrALAZINE 50 mg oral tablet: 1 tab(s) orally 2 times a day  Metoprolol Succinate  mg oral tablet, extended release: 1 tab(s) orally once a day  MS Contin 60 mg oral tablet, extended release: 2 tab(s) orally 3 times a day (after meals)  Reglan 10 mg oral tablet: 1 tab(s) orally 4 times a day (before meals and at bedtime)  Trelegy Ellipta 100 mcg-62.5 mcg-25 mcg/inh inhalation powder: 1 puff(s) inhaled once a day      Vital Signs:   T(F): 96.8 (05-07-22 @ 05:47), Max: 99 (05-06-22 @ 21:06)  HR: 93 (05-07-22 @ 05:47) (78 - 93)  BP: 131/68 (05-07-22 @ 05:47) (124/71 - 131/68)  RR: 18 (05-07-22 @ 05:47) (18 - 18)  SpO2: --        Physical Exam:   GENERAL: NAD, well-developed  HEAD:  Atraumatic, Normocephalic  EYES: EOMI, PERRLA, conjunctiva and sclera clear  ENT:No nasal obstruction or discharge. No tonsillar exudate, swelling or erythema.  NECK: Supple, No JVD  CHEST/LUNG: Clear to auscultation bilaterally; No wheeze  HEART: Regular rate and rhythm; No murmurs, rubs, or gallops  ABDOMEN: Soft, Nontender, Nondistended; Bowel sounds present  EXTREMITIES:  2+ Peripheral Pulses, No clubbing, cyanosis, or edema  PSYCH: AAOx3  NEUROLOGY: non-focal  SKIN: presence of black eschar on LLE.          Labs:                         15.3   6.23  )-----------( 197      ( 06 May 2022 07:53 )             45.7     Neutophil% 55.8, Lymphocyte% 31.3, Monocyte% 8.3, Bands% 0.3 05-06-22 @ 07:53    06 May 2022 07:53    137    |  99     |  15     ----------------------------<  103    4.0     |  23     |  1.0      Ca    9.8        06 May 2022 07:53    TPro  7.5    /  Alb  4.8    /  TBili  0.5    /  DBili  x      /  AST  14     /  ALT  8      /  AlkPhos  81     06 May 2022 07:53       pTT    62.2             ----< 1.05 INR  (05-06-22 @ 07:53)    12.10        PT  Chol 192, LDL --, HDL 60, VLDL --, TRG 65 05-06-22 @ 07:53                      Culture - Blood (collected 05-05-22 @ 17:13)  Source: .Blood Blood  Preliminary Report (05-06-22 @ 23:02):    No growth to date.    Culture - Blood (collected 05-05-22 @ 17:13)  Source: .Blood Blood  Preliminary Report (05-06-22 @ 23:02):    No growth to date.        =============  PT/INR - ( 06 May 2022 07:53 )   PT: 12.10 sec;   INR: 1.05 ratio         PTT - ( 06 May 2022 07:53 )  PTT:62.2 sec  CAPILLARY BLOOD GLUCOSE            LIVER FUNCTIONS - ( 06 May 2022 07:53 )  Alb: 4.8 g/dL / Pro: 7.5 g/dL / ALK PHOS: 81 U/L / ALT: 8 U/L / AST: 14 U/L / GGT: x             Culture - Blood (collected 05 May 2022 17:13)  Source: .Blood Blood  Preliminary Report (06 May 2022 23:02):    No growth to date.    Culture - Blood (collected 05 May 2022 17:13)  Source: .Blood Blood  Preliminary Report (06 May 2022 23:02):    No growth to date.        Radiology:   IMAGING:

## 2022-05-07 NOTE — PROGRESS NOTE ADULT - ASSESSMENT
51 year old woman with PMH of SLE  lupus with lupus anticoagulant, antiphospholipid syndrome,  rheumatoid arthritis, cervical cancer sp hysterectomy,  laryngeal cancer sp CT, RT, CHF s/p AICD+PPM, DVT/PE in 2015 on Fondaparinux, NSTEMI in 2015, subclavian stenosis s/p stent placement, HTN, HLD, PPM/AICD presented to the hospital for a wound on the calf of the left lower extremity. pt has had this wound since last week with swelling of the calf, erythema, fever chills. She's been taking Augmentin for the last 6 days but has been worsening with black eschar. pt went to Dr Guan who performed a duplex, was negative    #LLE wound:   #Fever  #Cellulitis  - wound on the left calf, with overlying black eschar.  - labs done were unremarkable. Duplex done as outpatient was negative.   - pt received aztreonam, metronidazole, Vancomycin.   - will start on Clindamycin 900 q8.   - fu esr, crp, blood  - follow up ID consult:   - fu Duplex arterial  - FU Burn evaluation.     #Pain control   - resumed home pain medication    #History of VTE/ PE on fondaparinux  - continue Fondaparinux for now.   - pt was supposed to get a repeat CT chest with IV contrast to ro PE 6 weeks after March  - will order repeat CT chest    #History of long-QT syndrome and VT sp AICD  #HFpEF  - EF 63% 2019  - EP on board  - s/p AICD placement (s/p laser lead extraction of ICD leads and device and reimplanted with new RA/RV lead and new ICD device (Medtronic) in 2021)    #SLE  #APLAS  - not in flare  - on Fondaparinux    #RA  - Outpatient f/u      #COPD  - not in exacerbation  - Continue inhalers and nebs for COPD      #MISC  DVT ppx: Fondaparinux  GI PPX not needed  DIET dash  Code status: full code

## 2022-05-07 NOTE — CONSULT NOTE ADULT - SUBJECTIVE AND OBJECTIVE BOX
1  51y  Female  HPI:  51 year old woman with PMH of SLE  lupus with lupus anticoagulant, antiphospholipid syndrome,  rheumatoid arthritis, cervical cancer sp hysterectomy,  laryngeal cancer sp CT, RT, CHF s/p AICD+PPM, DVT/PE in 2015 on Fondaparinux, NSTEMI in 2015, subclavian stenosis s/p stent placement, HTN, HLD, PPM/AICD presented to the hospital for a wound on the calf of the left lower extremity. pt has had this wound since last week with swelling of the calf, erythema, fever chills. She's been taking Augmentin for the last 6 days but has been worsening with black eschar. pt went to Dr Guan who performed a duplex, was negative  in the ED,pt's VS T(C): 36.8 (05-05-22 @ 14:33), Max: 36.8 (05-05-22 @ 14:33)  HR: 87 (05-05-22 @ 15:58) (87 - 114)  BP: 138/67 (05-05-22 @ 15:58) (138/67 - 142/91)  RR: 18 (05-05-22 @ 14:33) (18 - 18)  SpO2: 96% (05-05-22 @ 14:33) (96% - 96%), labs done were unremarkable. Duplex done as outpatient was negative.   pt received aztreonam, metronidazole, Vancomycin.   pt is admitted for workup/management (05 May 2022 20:56)    Hospital course***  Allergies    adhesives (Rash; Urticaria)  Arava (Anaphylaxis; Angioedema)  Avelox (Other)  Plaquenil (Other)  Vantin (Other (Mild))    Intolerances      PAST MEDICAL & SURGICAL HISTORY:  Lupus    RA (rheumatoid arthritis)    HTN (hypertension)    CHF (congestive heart failure)    COPD (chronic obstructive pulmonary disease)    DVT (deep venous thrombosis)    Pulmonary embolism    History of laryngeal cancer    GERD (gastroesophageal reflux disease)    Cervical cancer    S/P appendectomy    S/P cholecystectomy    AICD (automatic cardioverter/defibrillator) present    S/P hysterectomy    History of back surgery    H/O foot surgery    S/P eye surgery    Subclavian arterial stenosis        Labs:                        14.9   6.67  )-----------( 208      ( 07 May 2022 08:08 )             45.2           PE:  PHYSICAL EXAM: AAO x 3  full thickness wound to Left leg with induration, no fluctuation, serosang dc, decreased erythema

## 2022-05-07 NOTE — CONSULT NOTE ADULT - TIME BILLING
wound eval and treat
I have personally seen and examined this patient.  I have reviewed all pertinent clinical information and reviewed all relevant imaging and diagnostic studies personally.   I counseled the patient about diagnostic testing and treatment plan. All questions were answered.  I discussed recommendations with the primary team.

## 2022-05-08 ENCOUNTER — TRANSCRIPTION ENCOUNTER (OUTPATIENT)
Age: 52
End: 2022-05-08

## 2022-05-08 LAB
ALBUMIN SERPL ELPH-MCNC: 4.2 G/DL — SIGNIFICANT CHANGE UP (ref 3.5–5.2)
ALP SERPL-CCNC: 72 U/L — SIGNIFICANT CHANGE UP (ref 30–115)
ALT FLD-CCNC: 31 U/L — SIGNIFICANT CHANGE UP (ref 0–41)
ANION GAP SERPL CALC-SCNC: 14 MMOL/L — SIGNIFICANT CHANGE UP (ref 7–14)
APPEARANCE UR: CLEAR — SIGNIFICANT CHANGE UP
AST SERPL-CCNC: 28 U/L — SIGNIFICANT CHANGE UP (ref 0–41)
BACTERIA # UR AUTO: NEGATIVE — SIGNIFICANT CHANGE UP
BASOPHILS # BLD AUTO: 0.04 K/UL — SIGNIFICANT CHANGE UP (ref 0–0.2)
BASOPHILS NFR BLD AUTO: 0.6 % — SIGNIFICANT CHANGE UP (ref 0–1)
BILIRUB SERPL-MCNC: 0.4 MG/DL — SIGNIFICANT CHANGE UP (ref 0.2–1.2)
BILIRUB UR-MCNC: NEGATIVE — SIGNIFICANT CHANGE UP
BUN SERPL-MCNC: 17 MG/DL — SIGNIFICANT CHANGE UP (ref 10–20)
CALCIUM SERPL-MCNC: 9.8 MG/DL — SIGNIFICANT CHANGE UP (ref 8.5–10.1)
CHLORIDE SERPL-SCNC: 99 MMOL/L — SIGNIFICANT CHANGE UP (ref 98–110)
CO2 SERPL-SCNC: 24 MMOL/L — SIGNIFICANT CHANGE UP (ref 17–32)
COLOR SPEC: YELLOW — SIGNIFICANT CHANGE UP
CREAT SERPL-MCNC: 1 MG/DL — SIGNIFICANT CHANGE UP (ref 0.7–1.5)
DIFF PNL FLD: ABNORMAL
EGFR: 68 ML/MIN/1.73M2 — SIGNIFICANT CHANGE UP
EOSINOPHIL # BLD AUTO: 0.32 K/UL — SIGNIFICANT CHANGE UP (ref 0–0.7)
EOSINOPHIL NFR BLD AUTO: 5 % — SIGNIFICANT CHANGE UP (ref 0–8)
EPI CELLS # UR: 13 /HPF — HIGH (ref 0–5)
GLUCOSE SERPL-MCNC: 103 MG/DL — HIGH (ref 70–99)
GLUCOSE UR QL: NEGATIVE — SIGNIFICANT CHANGE UP
HCT VFR BLD CALC: 44.4 % — SIGNIFICANT CHANGE UP (ref 37–47)
HGB BLD-MCNC: 14.7 G/DL — SIGNIFICANT CHANGE UP (ref 12–16)
HYALINE CASTS # UR AUTO: 14 /LPF — HIGH (ref 0–7)
IMM GRANULOCYTES NFR BLD AUTO: 0.3 % — SIGNIFICANT CHANGE UP (ref 0.1–0.3)
KETONES UR-MCNC: SIGNIFICANT CHANGE UP
LEUKOCYTE ESTERASE UR-ACNC: NEGATIVE — SIGNIFICANT CHANGE UP
LYMPHOCYTES # BLD AUTO: 2.17 K/UL — SIGNIFICANT CHANGE UP (ref 1.2–3.4)
LYMPHOCYTES # BLD AUTO: 34.1 % — SIGNIFICANT CHANGE UP (ref 20.5–51.1)
MAGNESIUM SERPL-MCNC: 1.7 MG/DL — LOW (ref 1.8–2.4)
MCHC RBC-ENTMCNC: 28.7 PG — SIGNIFICANT CHANGE UP (ref 27–31)
MCHC RBC-ENTMCNC: 33.1 G/DL — SIGNIFICANT CHANGE UP (ref 32–37)
MCV RBC AUTO: 86.7 FL — SIGNIFICANT CHANGE UP (ref 81–99)
MONOCYTES # BLD AUTO: 0.73 K/UL — HIGH (ref 0.1–0.6)
MONOCYTES NFR BLD AUTO: 11.5 % — HIGH (ref 1.7–9.3)
NEUTROPHILS # BLD AUTO: 3.09 K/UL — SIGNIFICANT CHANGE UP (ref 1.4–6.5)
NEUTROPHILS NFR BLD AUTO: 48.5 % — SIGNIFICANT CHANGE UP (ref 42.2–75.2)
NITRITE UR-MCNC: NEGATIVE — SIGNIFICANT CHANGE UP
NRBC # BLD: 0 /100 WBCS — SIGNIFICANT CHANGE UP (ref 0–0)
PH UR: 5.5 — SIGNIFICANT CHANGE UP (ref 5–8)
PHOSPHATE SERPL-MCNC: 4.3 MG/DL — SIGNIFICANT CHANGE UP (ref 2.1–4.9)
PLATELET # BLD AUTO: 170 K/UL — SIGNIFICANT CHANGE UP (ref 130–400)
POTASSIUM SERPL-MCNC: 4.5 MMOL/L — SIGNIFICANT CHANGE UP (ref 3.5–5)
POTASSIUM SERPL-SCNC: 4.5 MMOL/L — SIGNIFICANT CHANGE UP (ref 3.5–5)
PROT SERPL-MCNC: 6.7 G/DL — SIGNIFICANT CHANGE UP (ref 6–8)
PROT UR-MCNC: SIGNIFICANT CHANGE UP
RBC # BLD: 5.12 M/UL — SIGNIFICANT CHANGE UP (ref 4.2–5.4)
RBC # FLD: 12.9 % — SIGNIFICANT CHANGE UP (ref 11.5–14.5)
RBC CASTS # UR COMP ASSIST: 4 /HPF — SIGNIFICANT CHANGE UP (ref 0–4)
SODIUM SERPL-SCNC: 137 MMOL/L — SIGNIFICANT CHANGE UP (ref 135–146)
SP GR SPEC: 1.03 — SIGNIFICANT CHANGE UP (ref 1.01–1.03)
UROBILINOGEN FLD QL: SIGNIFICANT CHANGE UP
WBC # BLD: 6.37 K/UL — SIGNIFICANT CHANGE UP (ref 4.8–10.8)
WBC # FLD AUTO: 6.37 K/UL — SIGNIFICANT CHANGE UP (ref 4.8–10.8)
WBC UR QL: 2 /HPF — SIGNIFICANT CHANGE UP (ref 0–5)

## 2022-05-08 PROCEDURE — 99239 HOSP IP/OBS DSCHRG MGMT >30: CPT

## 2022-05-08 RX ADMIN — Medication 81 MILLIGRAM(S): at 13:02

## 2022-05-08 RX ADMIN — MORPHINE SULFATE 120 MILLIGRAM(S): 50 CAPSULE, EXTENDED RELEASE ORAL at 13:53

## 2022-05-08 RX ADMIN — Medication 100 MILLIGRAM(S): at 13:01

## 2022-05-08 RX ADMIN — MORPHINE SULFATE 120 MILLIGRAM(S): 50 CAPSULE, EXTENDED RELEASE ORAL at 22:20

## 2022-05-08 RX ADMIN — MORPHINE SULFATE 120 MILLIGRAM(S): 50 CAPSULE, EXTENDED RELEASE ORAL at 21:52

## 2022-05-08 RX ADMIN — MORPHINE SULFATE 120 MILLIGRAM(S): 50 CAPSULE, EXTENDED RELEASE ORAL at 05:51

## 2022-05-08 RX ADMIN — Medication 100 MILLIGRAM(S): at 05:51

## 2022-05-08 RX ADMIN — Medication 1 MILLIGRAM(S): at 05:51

## 2022-05-08 RX ADMIN — Medication 100 MILLIGRAM(S): at 22:04

## 2022-05-08 RX ADMIN — Medication 1 APPLICATION(S): at 18:39

## 2022-05-08 RX ADMIN — MORPHINE SULFATE 120 MILLIGRAM(S): 50 CAPSULE, EXTENDED RELEASE ORAL at 14:23

## 2022-05-08 RX ADMIN — PREGABALIN 1000 MICROGRAM(S): 225 CAPSULE ORAL at 13:02

## 2022-05-08 RX ADMIN — FONDAPARINUX SODIUM 7.5 MILLIGRAM(S): 2.5 INJECTION, SOLUTION SUBCUTANEOUS at 13:02

## 2022-05-08 RX ADMIN — Medication 1 APPLICATION(S): at 05:52

## 2022-05-08 RX ADMIN — Medication 50 MILLIGRAM(S): at 18:40

## 2022-05-08 RX ADMIN — Medication 1 MILLIGRAM(S): at 13:02

## 2022-05-08 NOTE — DISCHARGE NOTE PROVIDER - HOSPITAL COURSE
51 year old female PMH of SLE  lupus with lupus anticoagulant, antiphospholipid syndrome,  rheumatoid arthritis, cervical cancer sp hysterectomy,  laryngeal cancer sp CT, RT, CHF s/p AICD+PPM, DVT/PE in 2015 on Fondaparinux, NSTEMI in 2015, subclavian stenosis s/p stent placement, HTN, HLD, PPM/AICD presented to the hospital for pain and erythema on the calf of the left lower extremity since last week with swelling of the calf, erythema, fever chills. She's been taking Augmentin for the last 6 days but has been worsening with black eschar. pt went to Dr Guan who performed a duplex, was negative, patient was admitted to the hospital and started on Clindamycin IV per ID, she has small abscess collection which drained on its own, seen by burn no need for wound debridement, now swelling and discharge resolved.     PHYSICAL EXAM:  GENERAL: NAD, well-developed.  HEAD:  Atraumatic, Normocephalic.  EYES: EOMI, PERRLA, conjunctiva and sclera clear.  NECK: Supple, No JVD.  CHEST/LUNG: Clear to auscultation bilaterally; No wheeze.  HEART: Regular rate and rhythm; S1 S2.   ABDOMEN: Soft, Nontender, Nondistended; Bowel sounds present.  EXTREMITIES: left lower calf medial small erythema with central elevation and open wound.   PSYCH: AAOx3.  NEUROLOGY: non-focal.  SKIN: No rashes or lesions.    A/P:   Left lower cellulitis with small abscess"  Erythema, tenderness with small collection on distal medial calf,   Blood cx no growth  Arterial Duplex was normal.   No fever, s/p Clindamycin IV 600mg q 8hrs. Discharge home with Clindamycin PO for 7 days.   Burn evaluated the wound, continue wound care.     History of VTE/ PE  Antiphospholipid Antibody syndrome:   continue Fondaparinux , patient developed DVT while on Coumadin and Xarelto.     History of long-QT syndrome and VT sp AICD  s/p AICD placement (s/p laser lead extraction of ICD leads and device and reimplanted with new RA/RV lead and new ICD device (Medtronic) in 2021)    Chronic HFpEF  Echo showed  EF 63% 2019    SLE:   RA: outpatient follow up with rheumatology, not on any therapy, no work up done in the hospital, one time dsDNA was negative, currently no signs of flares, no signs of acute arthritis or acute skin changes.     COPD   not in exacerbation  Continue inhalers and nebs for COPD   51 year old female PMH of SLE  lupus with lupus anticoagulant, antiphospholipid syndrome,  rheumatoid arthritis, cervical cancer sp hysterectomy,  laryngeal cancer sp CT, RT, CHF s/p AICD+PPM, DVT/PE in 2015 on Fondaparinux, NSTEMI in 2015, subclavian stenosis s/p stent placement, HTN, HLD, PPM/AICD presented to the hospital for pain and erythema on the calf of the left lower extremity since last week with swelling of the calf, erythema, fever chills. She's been taking Augmentin for the last 6 days but has been worsening with black eschar. pt went to Dr Guan who performed a duplex, was negative, patient was admitted to the hospital and started on Clindamycin IV per ID, she has small abscess collection which drained on its own, seen by burn no need for wound debridement, now swelling and discharge resolved.     A/P:   Left lower cellulitis with small abscess"  Erythema, tenderness with small collection on distal medial calf,   Blood cx no growth  Arterial Duplex was normal.   No fever, s/p Clindamycin IV 600mg q 8hrs.  Burn evaluated the wound, continue wound care.   Cellulitis resolved. ID recommended to stop ABx    History of VTE/ PE  Antiphospholipid Antibody syndrome:   continue Fondaparinux , patient developed DVT while on Coumadin and Xarelto.     History of long-QT syndrome and VT sp AICD  s/p AICD placement (s/p laser lead extraction of ICD leads and device and reimplanted with new RA/RV lead and new ICD device (Medtronic) in 2021)    Chronic HFpEF  Echo showed  EF 63% 2019    SLE:   RA: outpatient follow up with rheumatology, not on any therapy, no work up done in the hospital, one time dsDNA was negative, currently no signs of flares, no signs of acute arthritis or acute skin changes.     COPD   not in exacerbation  Continue inhalers and nebs for COPD   51 year old female PMH of SLE  lupus with lupus anticoagulant, antiphospholipid syndrome,  rheumatoid arthritis, cervical cancer sp hysterectomy,  laryngeal cancer sp CT, RT, CHF s/p AICD+PPM, DVT/PE in 2015 on Fondaparinux, NSTEMI in 2015, subclavian stenosis s/p stent placement, HTN, HLD, PPM/AICD presented to the hospital for pain and erythema on the calf of the left lower extremity since last week with swelling of the calf, erythema, fever chills. She's been taking Augmentin for the last 6 days but has been worsening with black eschar. pt went to Dr Guan who performed a duplex, was negative, patient was admitted to the hospital and started on Clindamycin IV per ID, she has small abscess collection which drained on its own, seen by burn no need for wound debridement, now swelling and discharge resolved.     A/P:   Left lower cellulitis with small abscess"  Erythema, tenderness with small collection on distal medial calf,   Blood cx no growth  Arterial Duplex was normal.   No fever, s/p Clindamycin IV 600mg q 8hrs.  Burn evaluated the wound, continue wound care.   Cellulitis resolved. ID recommended to stop ABx    History of VTE/ PE  Antiphospholipid Antibody syndrome:   - patient developed DVT while on Coumadin and Xarelto  - pt was not able to keep therapeutic INR while on coumadin  - pt going home on Heparin 17,500units sq q12hrs - 5cc seringes rx and med given yesterday by vivo.    History of long-QT syndrome and VT sp AICD  s/p AICD placement (s/p laser lead extraction of ICD leads and device and reimplanted with new RA/RV lead and new ICD device (Medtronic) in 2021)    Chronic HFpEF  Echo showed  EF 63% 2019    SLE:   RA: outpatient follow up with rheumatology, not on any therapy, no work up done in the hospital, one time dsDNA was negative, currently no signs of flares, no signs of acute arthritis or acute skin changes.     COPD   not in exacerbation  Continue inhalers and nebs for COPD

## 2022-05-08 NOTE — DISCHARGE NOTE PROVIDER - NSDCMRMEDTOKEN_GEN_ALL_CORE_FT
albuterol 90 mcg/inh inhalation aerosol: 2 puff(s) inhaled every 6 hours  ALPRAZolam 1 mg oral tablet: 2 tab(s) orally 3 times a day, As Needed  aspirin 81 mg oral delayed release tablet: 1 tab(s) orally once a day  Benlysta: 200 milligram(s) intravenous once a week  clindamycin 300 mg oral capsule: 1 cap(s) orally every 6 hours   cyanocobalamin 1000 mcg/mL injectable solution: 1 milliliter(s) injectable once a week  folic acid 1 mg oral tablet: 1 tab(s) orally once a day  fondaparinux 7.5 mg/0.6 mL subcutaneous solution: 0.6 milliliter(s) subcutaneous once a day  hydrALAZINE 50 mg oral tablet: 1 tab(s) orally 2 times a day  Metoprolol Succinate  mg oral tablet, extended release: 1 tab(s) orally once a day  MS Contin 60 mg oral tablet, extended release: 2 tab(s) orally 3 times a day (after meals)  Reglan 10 mg oral tablet: 1 tab(s) orally 4 times a day (before meals and at bedtime)  silver sulfADIAZINE 1% topical cream: 1 application topically 2 times a day  Trelegy Ellipta 100 mcg-62.5 mcg-25 mcg/inh inhalation powder: 1 puff(s) inhaled once a day   albuterol 90 mcg/inh inhalation aerosol: 2 puff(s) inhaled every 6 hours, As Needed  ALPRAZolam 1 mg oral tablet: 2 tab(s) orally 3 times a day, As Needed  aspirin 81 mg oral delayed release tablet: 1 tab(s) orally once a day  cyanocobalamin 1000 mcg/mL injectable solution: 1 milliliter(s) injectable once a week  folic acid 1 mg oral tablet: 1 tab(s) orally once a day  fondaparinux 7.5 mg/0.6 mL subcutaneous solution: 0.6 milliliter(s) subcutaneous once a day  hydrALAZINE 50 mg oral tablet: 1 tab(s) orally 2 times a day  Metoprolol Succinate  mg oral tablet, extended release: 1 tab(s) orally once a day  MS Contin 60 mg oral tablet, extended release: 2 tab(s) orally 3 times a day (after meals), As Needed  Trelegy Ellipta 100 mcg-62.5 mcg-25 mcg/inh inhalation powder: 1 puff(s) inhaled once a day   albuterol 90 mcg/inh inhalation aerosol: 2 puff(s) inhaled every 6 hours, As Needed  ALPRAZolam 1 mg oral tablet: 2 tab(s) orally 3 times a day, As Needed  aspirin 81 mg oral delayed release tablet: 1 tab(s) orally once a day  cyanocobalamin 1000 mcg/mL injectable solution: 1 milliliter(s) injectable once a week  folic acid 1 mg oral tablet: 1 tab(s) orally once a day  heparin 10,000 units/mL injectable solution: 1.75 milliliter(s) subcutaneously every 12 hours   hydrALAZINE 50 mg oral tablet: 1 tab(s) orally 2 times a day  Metoprolol Succinate  mg oral tablet, extended release: 1 tab(s) orally once a day  MS Contin 60 mg oral tablet, extended release: 2 tab(s) orally 3 times a day (after meals), As Needed  Trelegy Ellipta 100 mcg-62.5 mcg-25 mcg/inh inhalation powder: 1 puff(s) inhaled once a day

## 2022-05-08 NOTE — DISCHARGE NOTE NURSING/CASE MANAGEMENT/SOCIAL WORK - PATIENT PORTAL LINK FT
You can access the FollowMyHealth Patient Portal offered by [facility] by registering at the following website: [email or web address]. By joining [email or web address]’s FollowMyHealth portal, you will also be able to view your health information using other applications (apps) compatible with our system.

## 2022-05-08 NOTE — DISCHARGE NOTE PROVIDER - NPI NUMBER (FOR SYSADMIN USE ONLY) :
[[id number]] [[id number]],[[id number]] [[id number]],[[id number]],[[id number]],[[id number]],[[id number]],[[id number]]

## 2022-05-08 NOTE — DISCHARGE NOTE NURSING/CASE MANAGEMENT/SOCIAL WORK - NSDCPEFALRISK_GEN_ALL_CORE
For information on Fall & Injury Prevention, visit: [email or web address] OR  [email or web address] OR  https://www.cdc.gov/steadi/patient.html

## 2022-05-08 NOTE — DISCHARGE NOTE PROVIDER - NSDCFUSCHEDAPPT_GEN_ALL_CORE_FT
Jose Gilliland  [facility] Physician [facility]  Gensurg [address]  Scheduled Appointment: 05/12/2022    Power Hanna  [facility] Physician [facility]  Otolaryng [address]  Scheduled Appointment: 05/23/2022    Andre Hurst  [facility] Physician [facility]  PULMED [address]  Scheduled Appointment: 06/27/2022    [facility] Physician [facility]  Cardio [address]  Scheduled Appointment: 07/20/2022     Power Hanna  [facility] Physician [facility]  Otolaryng [address]  Scheduled Appointment: 05/23/2022    Jose Gilliland  [facility] Physician [facility]  Gensurg [address]  Scheduled Appointment: 06/02/2022    Andre Hurst  [facility] Physician [facility]  PULMED [address]  Scheduled Appointment: 06/27/2022    [facility]  Cardio [address]  Scheduled Appointment: 07/20/2022

## 2022-05-08 NOTE — DISCHARGE NOTE PROVIDER - NSDCCPCAREPLAN_GEN_ALL_CORE_FT
PRINCIPAL DISCHARGE DIAGNOSIS  Diagnosis: Infected wound  Assessment and Plan of Treatment: improved, continue with oral antibiotics       PRINCIPAL DISCHARGE DIAGNOSIS  Diagnosis: Pain of left calf  Assessment and Plan of Treatment: -follow up with PCP      SECONDARY DISCHARGE DIAGNOSES  Diagnosis: Antiphospholipid syndrome  Assessment and Plan of Treatment: -follow up with outpatient PCP and rheumatologist     PRINCIPAL DISCHARGE DIAGNOSIS  Diagnosis: Pain of left calf  Assessment and Plan of Treatment: you possibly had an infection in your left leg. It resolved with antibiotics. The vascular studies, Cat Scan showed no evidence of hematoma, blood clot or fluid collection. The swelling is improving. Cat Scan just showed some remnants of inflammation. Please elevate your left leg as much as you can. Use warm compresses to the area of swelling and follow up with your doctors.      SECONDARY DISCHARGE DIAGNOSES  Diagnosis: Chronic obstructive pulmonary disease (COPD)  Assessment and Plan of Treatment: please abstain from tobacco products and second hand smoke. Please follow up with your pulmonologist as outpt.     PRINCIPAL DISCHARGE DIAGNOSIS  Diagnosis: Pain of left calf  Assessment and Plan of Treatment: you possibly had an infection in your left leg. It resolved with antibiotics. The vascular studies, Cat Scan showed no evidence of hematoma, blood clot or fluid collection. The swelling is improving. Cat Scan just showed some remnants of inflammation. Please elevate your left leg as much as you can. Use warm compresses to the area of swelling and follow up with your doctors.  BLEEDING IN LEFT CALF Suspected SLE Related Dermatitis  - Infection ruled out no cellulitis no abx on discharge  ANTIPHOSPHOLIPID SYNDROME w/ SLE  - follow up at [facility] PER HEMATOLOGY REFERRAL  - TAKE HEPARIN 17,500 UNITS SUBCUTANEOUSLY EVERY 12HRS  - FOLLOW RN INSTRUCTIONS ON CORRECT ADMINISTRATION AND VOLUME OF MEDICATION 3.5 CC VOLUME AS INSTRUCTED BY RN   - FOLLOW UP WITH YOUR PMD / HEMATOLOGY / DR MILLER - FOLLOW UP FOR YOUR SKIN BIOPSY   - RETURN TO THE ER FOR ANY EMERGENCIES      SECONDARY DISCHARGE DIAGNOSES  Diagnosis: Chronic obstructive pulmonary disease (COPD)  Assessment and Plan of Treatment: please abstain from tobacco products and second hand smoke. Please follow up with your pulmonologist as outpt.     PRINCIPAL DISCHARGE DIAGNOSIS  Diagnosis: Pain of left calf  Assessment and Plan of Treatment: you possibly had an infection in your left leg. It resolved with antibiotics. The vascular studies, Cat Scan showed no evidence of hematoma, blood clot or fluid collection. The swelling is improving. Cat Scan just showed some remnants of inflammation. Please elevate your left leg as much as you can. Use warm compresses to the area of swelling and follow up with your doctors.  BLEEDING IN LEFT CALF Suspected SLE Related Dermatitis  - Infection ruled out no cellulitis no abx on discharge  ANTIPHOSPHOLIPID SYNDROME w/ SLE  - follow up at [facility] PER HEMATOLOGY REFERRAL  - TAKE HEPARIN 17,500 UNITS SUBCUTANEOUSLY EVERY 12HRS  - FOLLOW RN INSTRUCTIONS ON CORRECT ADMINISTRATION AND VOLUME OF MEDICATION 3.5 CC VOLUME AS INSTRUCTED BY RN   - FOLLOW UP WITH YOUR PMD / HEMATOLOGY / DR MILLER - FOLLOW UP FOR YOUR SKIN BIOPSY   - RETURN TO THE ER FOR ANY EMERGENCIES      SECONDARY DISCHARGE DIAGNOSES  Diagnosis: Chronic obstructive pulmonary disease (COPD)  Assessment and Plan of Treatment: please abstain from tobacco products and second hand smoke. Please follow up with your pulmonologist as outpt.  HFrEF s/p AICD / COMPENSATED - c/w usual care f/u with your cardiologist

## 2022-05-08 NOTE — DISCHARGE NOTE PROVIDER - ATTENDING DISCHARGE PHYSICAL EXAMINATION:
Vital Signs Last 24 Hrs  T(C): 36.7 (15 May 2022 05:36), Max: 37.2 (14 May 2022 20:25)  T(F): 98.1 (15 May 2022 05:36), Max: 98.9 (14 May 2022 20:25)  HR: 67 (15 May 2022 05:36) (67 - 80)  BP: 101/54 (15 May 2022 05:36) (101/54 - 137/63)  BP(mean): 75 (15 May 2022 05:36) (75 - 75)  RR: 18 (15 May 2022 05:36) (18 - 18)  SpO2: 98% RA    GEN; NAD / PT IS FEELING WELL / READY TO GO HOME WHEN  ARRIVES - S/P HEP SQ TEACHING MEDS OBTAINED FROM [facility] YESTERDAY  CV: nl S1/2  RESP: CLEAR B/L  GI: +BS, SOFT, NT, ND  EXT: NO E/C/C  MS: AOX3                        13.8   8.19  )-----------( 209      ( 15 May 2022 06:06 )             40.5     05-15    137  |  99  |  15  ----------------------------<  100<H>  4.4   |  25  |  0.9    Ca    10.2<H>      15 May 2022 06:06  Phos  3.9     05-15  Mg     1.8     05-15    TPro  6.9  /  Alb  4.5  /  TBili  0.5  /  DBili  x   /  AST  15  /  ALT  11  /  AlkPhos  73  05-15

## 2022-05-08 NOTE — DISCHARGE NOTE PROVIDER - CARE PROVIDER_API CALL
[place] [address]  Phone: [phone]  Fax: [phone]  Follow Up Time: 2 weeks   [place] [address]  Phone: [phone]  Fax: [phone]  Follow Up Time: 2 weeks    Yecenia Andrea (DO)  Internal Medicine; Rheumatology  [address] [place] [address]  Phone: [phone]  Fax: [phone]  Follow Up Time:    [facility] [place]  MEDICINE  [address] [place] [address]  Phone: [phone]  Fax: [phone]  Follow Up Time: 2 weeks    Yecenia Andrea (DO)  Internal Medicine; Rheumatology  [address] [place] [address]  Phone: [phone]  Fax: [phone]  Follow Up Time: 2 weeks    Waqas Guan)  Surgery; Vascular Surgery  [address] [place] [address]  Phone: [phone]  Fax: [phone]  Follow Up Time: 1-3 days    Buddy Sultana)  Hematology; Internal Medicine; Medical Oncology  [address] [address]  Phone: [phone]  Fax: [phone]  Follow Up Time: 1-3 days    Teo Novak)  Infectious Disease; Internal Medicine  [address] [place] [address]  Phone: [phone]  Fax: [phone]  Follow Up Time: 1 week    Andre Hurst)  Critical Care Medicine; Internal Medicine; Pulmonary Disease; Sleep Medicine  [address] [place] [address]  Phone: [phone]  Fax: [phone]  Follow Up Time: 2 weeks

## 2022-05-08 NOTE — DISCHARGE NOTE PROVIDER - PROVIDER TOKENS
PROVIDER:[TOKEN:[[id number]],FOLLOWUP:[2 weeks]] PROVIDER:[TOKEN:[[id number]],FOLLOWUP:[2 weeks]],PROVIDER:[TOKEN:[[id number]]] PROVIDER:[TOKEN:[[id number]],FOLLOWUP:[2 weeks]],PROVIDER:[TOKEN:[[id number]],FOLLOWUP:[2 weeks]],PROVIDER:[TOKEN:[[id number]],FOLLOWUP:[1-3 days]],PROVIDER:[TOKEN:[[id number]],FOLLOWUP:[1-3 days]],PROVIDER:[TOKEN:[[id number]],FOLLOWUP:[1 week]],PROVIDER:[TOKEN:[[id number]],FOLLOWUP:[2 weeks]]

## 2022-05-09 LAB
ALBUMIN SERPL ELPH-MCNC: 4.4 G/DL — SIGNIFICANT CHANGE UP (ref 3.5–5.2)
ALP SERPL-CCNC: 95 U/L — SIGNIFICANT CHANGE UP (ref 30–115)
ALT FLD-CCNC: 27 U/L — SIGNIFICANT CHANGE UP (ref 0–41)
ANION GAP SERPL CALC-SCNC: 12 MMOL/L — SIGNIFICANT CHANGE UP (ref 7–14)
AST SERPL-CCNC: 21 U/L — SIGNIFICANT CHANGE UP (ref 0–41)
BASOPHILS # BLD AUTO: 0.05 K/UL — SIGNIFICANT CHANGE UP (ref 0–0.2)
BASOPHILS NFR BLD AUTO: 0.6 % — SIGNIFICANT CHANGE UP (ref 0–1)
BILIRUB SERPL-MCNC: 0.3 MG/DL — SIGNIFICANT CHANGE UP (ref 0.2–1.2)
BUN SERPL-MCNC: 17 MG/DL — SIGNIFICANT CHANGE UP (ref 10–20)
CALCIUM SERPL-MCNC: 9.9 MG/DL — SIGNIFICANT CHANGE UP (ref 8.5–10.1)
CHLORIDE SERPL-SCNC: 98 MMOL/L — SIGNIFICANT CHANGE UP (ref 98–110)
CO2 SERPL-SCNC: 27 MMOL/L — SIGNIFICANT CHANGE UP (ref 17–32)
CREAT SERPL-MCNC: 0.9 MG/DL — SIGNIFICANT CHANGE UP (ref 0.7–1.5)
CULTURE RESULTS: NO GROWTH — SIGNIFICANT CHANGE UP
EGFR: 77 ML/MIN/1.73M2 — SIGNIFICANT CHANGE UP
EOSINOPHIL # BLD AUTO: 0.29 K/UL — SIGNIFICANT CHANGE UP (ref 0–0.7)
EOSINOPHIL NFR BLD AUTO: 3.7 % — SIGNIFICANT CHANGE UP (ref 0–8)
GLUCOSE SERPL-MCNC: 99 MG/DL — SIGNIFICANT CHANGE UP (ref 70–99)
HCT VFR BLD CALC: 44.8 % — SIGNIFICANT CHANGE UP (ref 37–47)
HGB BLD-MCNC: 14.7 G/DL — SIGNIFICANT CHANGE UP (ref 12–16)
IMM GRANULOCYTES NFR BLD AUTO: 0.4 % — HIGH (ref 0.1–0.3)
LYMPHOCYTES # BLD AUTO: 2.4 K/UL — SIGNIFICANT CHANGE UP (ref 1.2–3.4)
LYMPHOCYTES # BLD AUTO: 30.9 % — SIGNIFICANT CHANGE UP (ref 20.5–51.1)
MAGNESIUM SERPL-MCNC: 1.6 MG/DL — LOW (ref 1.8–2.4)
MCHC RBC-ENTMCNC: 28.7 PG — SIGNIFICANT CHANGE UP (ref 27–31)
MCHC RBC-ENTMCNC: 32.8 G/DL — SIGNIFICANT CHANGE UP (ref 32–37)
MCV RBC AUTO: 87.5 FL — SIGNIFICANT CHANGE UP (ref 81–99)
MONOCYTES # BLD AUTO: 0.83 K/UL — HIGH (ref 0.1–0.6)
MONOCYTES NFR BLD AUTO: 10.7 % — HIGH (ref 1.7–9.3)
NEUTROPHILS # BLD AUTO: 4.17 K/UL — SIGNIFICANT CHANGE UP (ref 1.4–6.5)
NEUTROPHILS NFR BLD AUTO: 53.7 % — SIGNIFICANT CHANGE UP (ref 42.2–75.2)
NRBC # BLD: 0 /100 WBCS — SIGNIFICANT CHANGE UP (ref 0–0)
PHOSPHATE SERPL-MCNC: 4.4 MG/DL — SIGNIFICANT CHANGE UP (ref 2.1–4.9)
PLATELET # BLD AUTO: 196 K/UL — SIGNIFICANT CHANGE UP (ref 130–400)
POTASSIUM SERPL-MCNC: 4.7 MMOL/L — SIGNIFICANT CHANGE UP (ref 3.5–5)
POTASSIUM SERPL-SCNC: 4.7 MMOL/L — SIGNIFICANT CHANGE UP (ref 3.5–5)
PROT SERPL-MCNC: 6.7 G/DL — SIGNIFICANT CHANGE UP (ref 6–8)
RBC # BLD: 5.12 M/UL — SIGNIFICANT CHANGE UP (ref 4.2–5.4)
RBC # FLD: 12.9 % — SIGNIFICANT CHANGE UP (ref 11.5–14.5)
SODIUM SERPL-SCNC: 137 MMOL/L — SIGNIFICANT CHANGE UP (ref 135–146)
SPECIMEN SOURCE: SIGNIFICANT CHANGE UP
WBC # BLD: 7.77 K/UL — SIGNIFICANT CHANGE UP (ref 4.8–10.8)
WBC # FLD AUTO: 7.77 K/UL — SIGNIFICANT CHANGE UP (ref 4.8–10.8)

## 2022-05-09 PROCEDURE — 99223 1ST HOSP IP/OBS HIGH 75: CPT

## 2022-05-09 PROCEDURE — 73706 CT ANGIO LWR EXTR W/O&W/DYE: CPT | Mod: 26,LT

## 2022-05-09 PROCEDURE — 99233 SBSQ HOSP IP/OBS HIGH 50: CPT

## 2022-05-09 RX ORDER — MAGNESIUM SULFATE 500 MG/ML
2 VIAL (ML) INJECTION ONCE
Refills: 0 | Status: COMPLETED | OUTPATIENT
Start: 2022-05-09 | End: 2022-05-09

## 2022-05-09 RX ADMIN — PREGABALIN 1000 MICROGRAM(S): 225 CAPSULE ORAL at 11:22

## 2022-05-09 RX ADMIN — MORPHINE SULFATE 120 MILLIGRAM(S): 50 CAPSULE, EXTENDED RELEASE ORAL at 21:01

## 2022-05-09 RX ADMIN — MORPHINE SULFATE 120 MILLIGRAM(S): 50 CAPSULE, EXTENDED RELEASE ORAL at 14:57

## 2022-05-09 RX ADMIN — Medication 1 APPLICATION(S): at 05:54

## 2022-05-09 RX ADMIN — Medication 100 MILLIGRAM(S): at 22:48

## 2022-05-09 RX ADMIN — FONDAPARINUX SODIUM 7.5 MILLIGRAM(S): 2.5 INJECTION, SOLUTION SUBCUTANEOUS at 11:22

## 2022-05-09 RX ADMIN — Medication 100 MILLIGRAM(S): at 06:38

## 2022-05-09 RX ADMIN — Medication 1 MILLIGRAM(S): at 11:21

## 2022-05-09 RX ADMIN — Medication 1 MILLIGRAM(S): at 23:47

## 2022-05-09 RX ADMIN — Medication 2 GRAM(S): at 10:28

## 2022-05-09 RX ADMIN — Medication 50 MILLIGRAM(S): at 05:54

## 2022-05-09 RX ADMIN — MORPHINE SULFATE 120 MILLIGRAM(S): 50 CAPSULE, EXTENDED RELEASE ORAL at 15:30

## 2022-05-09 RX ADMIN — Medication 1 MILLIGRAM(S): at 03:14

## 2022-05-09 RX ADMIN — MORPHINE SULFATE 120 MILLIGRAM(S): 50 CAPSULE, EXTENDED RELEASE ORAL at 06:30

## 2022-05-09 RX ADMIN — Medication 100 MILLIGRAM(S): at 14:57

## 2022-05-09 RX ADMIN — Medication 100 MILLIGRAM(S): at 05:54

## 2022-05-09 RX ADMIN — MORPHINE SULFATE 120 MILLIGRAM(S): 50 CAPSULE, EXTENDED RELEASE ORAL at 05:53

## 2022-05-09 RX ADMIN — Medication 81 MILLIGRAM(S): at 11:21

## 2022-05-09 RX ADMIN — Medication 1 APPLICATION(S): at 17:35

## 2022-05-09 NOTE — CONSULT NOTE ADULT - ATTENDING COMMENTS
51 year old female with a history of SLE, APS on fondaparinux, cervical cancer, laryngeal cancer, CHF AICD+PPM who presented for left calf wound not getting better on Augmentin as outpatient. Rheumatology is asked to see patient for concern of vasculitis. She reports developing initially a red skin rash that progressed to ecchymosis and black. Lesion is tender to touch. She was febrile at home [id number]. Reports being diagnosed with SLE at age 19 she previously saw Dr. Fraire, Kemar Carroll, and Rober, Was on MTX, Arava, HCQ, Benlysta, MMF without improvement in symptoms. Had adverse reaction to HCQ and Arava. Main symptoms are fatigue and joint stiffness. Denies mucositis, hair loss, photosensitivity, rash, sicca symptoms, joint swelling. Exam with LLE calf with tender ecchymotic lesion. Labs with normal ESR, CRP 14, no leukocytosis, anemia, or thrombocytopenia, CMP normal, UA moderate blood, blood cultures negative,  bilateral LE arterial duplex normal, LUE venus duplex acute DVT, LUE arterial duplex normal. Possible vasculitis skin lesion vs. cellulitis vs. abscess. She has fatigue and joint stiffness both chronic and refractory to prior treatments for SLE, otherwise no acute symptoms of SLE. No systemic symptoms of ANCA vasculitis.   -Check SLE labs and ANCA as above  -F/u CT LLE   -Skin biopsy to r/o vasculitis  -Continue antibiotics 51 year old female with a history of SLE, APS on fondaparinux, cervical cancer, laryngeal cancer, CHF AICD+PPM who presented for left calf wound not getting better on Augmentin as outpatient. Rheumatology is asked to see patient for concern of vasculitis. She reports developing initially a red skin rash that progressed to ecchymosis and black. Lesion is tender to touch. She was febrile at home [id number]. Reports being diagnosed with SLE at age 19 she previously saw Dr. Fraire, Kemar Carroll, and Rober, Was on MTX, Arava, HCQ, Benlysta, MMF without improvement in symptoms. Had adverse reaction to HCQ and Arava. Main symptoms are fatigue and joint stiffness. Denies mucositis, hair loss, photosensitivity, rash, sicca symptoms, joint swelling. Exam with LLE calf with tender ecchymotic lesion. Labs with normal ESR, CRP 14, no leukocytosis, anemia, or thrombocytopenia, CMP normal, UA moderate blood, blood cultures negative,  bilateral LE arterial duplex normal, LUE venus duplex acute DVT, LUE arterial duplex normal. Possible vasculitis skin lesion vs. cellulitis vs. abscess. She has fatigue and joint stiffness both chronic and refractory to prior treatments for SLE, otherwise no acute symptoms of SLE. No systemic symptoms of ANCA vasculitis.   -Check SLE labs and ANCA as above  -F/u CT LLE and CTA LLE with IV contrast  -Skin biopsy to r/o vasculitis  -Continue antibiotics

## 2022-05-09 NOTE — PROGRESS NOTE ADULT - ASSESSMENT
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 pt has had this wound since last week with swelling of the calf, erythema, fever chills. She's been taking Augmentin for the last 6 days but has been worsening with black eschar.     IMPRESSION  #LLE lesions- overall lower suspicion for abscess, when opened- no purulence, only blood - rule out vasculitis/polyarteritis nodosa    denies spider/tick bites    reports hx MRSA  < from: Xray Tibia + Fibula 2 Views, Left (05.05.22 @ 18:01) >No acute displaced fracture.  #Sepsis ruled out on admission   #SLE  lupus with lupus anticoagulant, antiphospholipid syndrome,  rheumatoid arthritis  #HF s/p AICD+PPM  #Abx allergy:  Vantin (Other (Mild))- unknown  avelox- heart issues    Creatinine, Serum: 1.0 (05-06-22 @ 07:53)    Weight (kg): 71.2 (05-05-22 @ 14:33)    RECOMMENDATIONS  - Clinda 600mg IV q8h for now   - Rheum consult  - ESR, CRP, ANCA, YOON, dsDNA  - Consider CT LE (she needs a  CT chest next week- asking if can be performed together)  - MRSA nares  - Warm compresses  - Appreciate Burn consult     If any questions, please call or send a message on [employer] Teams  Please continue to update ID with any pertinent new laboratory or radiographic findings  Spectra [id number]

## 2022-05-09 NOTE — PROGRESS NOTE ADULT - SUBJECTIVE AND OBJECTIVE BOX
Hospital Day:  4d    Subjective:    No acute events overnight.    Chart reviewed, patient seen and examined at bedside in AM. Patient appears comfortable while at rest in bed. No other complaints.     Denies headaches, changes in vision, chest pains, SOB, n/v/d, abd pain, constipation, changes in urination, pain on urination, weakness, fatigue, joint pain or muscle pain.       Past Medical Hx:   Lupus    AICD (automatic cardioverter/defibrillator) present    Throat cancer    RA (rheumatoid arthritis)    HTN (hypertension)    CHF (congestive heart failure)    COPD (chronic obstructive pulmonary disease)    DVT (deep venous thrombosis)    Pulmonary embolus    Pulmonary embolism    History of laryngeal cancer    GERD (gastroesophageal reflux disease)    Cervical cancer      Past Sx:  S/P appendectomy    S/P cholecystectomy    History of laryngeal cancer    AICD (automatic cardioverter/defibrillator) present    S/P hysterectomy    History of back surgery    H/O foot surgery    S/P eye surgery    Subclavian arterial stenosis      Allergies:  adhesives (Rash; Urticaria)  Arava (Anaphylaxis; Angioedema)  Avelox (Other)  Plaquenil (Other)  Vantin (Other (Mild))    Current Meds:   Standng Meds:  albuterol/ipratropium for Nebulization 3 milliLiter(s) Nebulizer every 6 hours  aspirin enteric coated 81 milliGRAM(s) Oral daily  budesonide 160 MICROgram(s)/formoterol 4.5 MICROgram(s) Inhaler 2 Puff(s) Inhalation two times a day  clindamycin IVPB 600 milliGRAM(s) IV Intermittent every 8 hours  cyanocobalamin 1000 MICROGram(s) Oral daily  folic acid 1 milliGRAM(s) Oral daily  fondaparinux Injectable 7.5 milliGRAM(s) SubCutaneous daily  hydrALAZINE 50 milliGRAM(s) Oral two times a day  metoprolol succinate  milliGRAM(s) Oral daily  morphine ER Tablet 120 milliGRAM(s) Oral <User Schedule>  silver sulfADIAZINE 1% Cream 1 Application(s) Topical two times a day    PRN Meds:  acetaminophen     Tablet .. 650 milliGRAM(s) Oral every 6 hours PRN Temp greater or equal to 38C (100.4F), Mild Pain (1 - 3)  ALPRAZolam 1 milliGRAM(s) Oral every 6 hours PRN anxiety  aluminum hydroxide/magnesium hydroxide/simethicone Suspension 30 milliLiter(s) Oral every 4 hours PRN Dyspepsia  melatonin 3 milliGRAM(s) Oral at bedtime PRN Insomnia  ondansetron Injectable 4 milliGRAM(s) IV Push every 8 hours PRN Nausea and/or Vomiting    HOME MEDICATIONS:  albuterol 90 mcg/inh inhalation aerosol: 2 puff(s) inhaled every 6 hours  ALPRAZolam 1 mg oral tablet: 2 tab(s) orally 3 times a day, As Needed  Benlysta: 200 milligram(s) intravenous once a week  cyanocobalamin 1000 mcg/mL injectable solution: 1 milliliter(s) injectable once a week  folic acid 1 mg oral tablet: 1 tab(s) orally once a day  fondaparinux 7.5 mg/0.6 mL subcutaneous solution: 0.6 milliliter(s) subcutaneous once a day  hydrALAZINE 50 mg oral tablet: 1 tab(s) orally 2 times a day  Metoprolol Succinate  mg oral tablet, extended release: 1 tab(s) orally once a day  MS Contin 60 mg oral tablet, extended release: 2 tab(s) orally 3 times a day (after meals)  Reglan 10 mg oral tablet: 1 tab(s) orally 4 times a day (before meals and at bedtime)  Trelegy Ellipta 100 mcg-62.5 mcg-25 mcg/inh inhalation powder: 1 puff(s) inhaled once a day      Vital Signs:   T(F): 96.1 (22 @ 05:32), Max: 97.4 (22 @ 14:44)  HR: 73 (22 @ 05:32) (68 - 84)  BP: 130/61 (22 @ 05:32) (109/57 - 166/785)  RR: 18 (22 @ 05:32) (18 - 18)  SpO2: 95% (22 @ 08:30) (95% - 95%)      22 @ 07:01  -  22 @ 07:00  --------------------------------------------------------  IN: 0 mL / OUT: 2 mL / NET: -2 mL        Physical Exam:   CONSTITUTIONAL: Well-developed; well-nourished; in no acute distress, speaking in full sentences  HEAD: Normocephalic; atraumatic  EYES: PERRL, EOMI, no conjunctival erythema  NECK: Supple; non tender, FROM  CARD: +S1, S2 Regular rate and rhythm.  RESP: CTABL  ABD: soft nontender, nondistended, no rebound, no guarding, no rigidity  EXT: moves all extremities,  No clubbing, cyanosis or edema.   NEURO: Alert, oriented, grossly unremarkable, no focal deficits, cn ii-xii grossly intact  PSYCH: Cooperative, appropriate         Labs:                         14.7   6.37  )-----------( 170      ( 08 May 2022 08:04 )             44.4     Neutophil% 48.5, Lymphocyte% 34.1, Monocyte% 11.5, Bands% 0.3 22 @ 08:04    08 May 2022 08:04    137    |  99     |  17     ----------------------------<  103    4.5     |  24     |  1.0      Ca    9.8        08 May 2022 08:04  Phos  4.3       08 May 2022 08:04  Mg     1.7       08 May 2022 08:04    TPro  6.7    /  Alb  4.2    /  TBili  0.4    /  DBili  x      /  AST  28     /  ALT  31     /  AlkPhos  72     08 May 2022 08:04                    Urinalysis Basic - ( 08 May 2022 10:25 )    Color: Yellow / Appearance: Clear / S.028 / pH: x  Gluc: x / Ketone: Trace  / Bili: Negative / Urobili: <2 mg/dL   Blood: x / Protein: Trace / Nitrite: Negative   Leuk Esterase: Negative / RBC: 4 /HPF / WBC 2 /HPF   Sq Epi: x / Non Sq Epi: 13 /HPF / Bacteria: Negative          Culture - Blood (collected 22 @ 07:53)  Source: .Blood Blood  Preliminary Report (22 @ 15:06):    No growth to date.    Culture - Blood (collected 22 @ 17:13)  Source: .Blood Blood  Preliminary Report (22 @ 23:02):    No growth to date.    Culture - Blood (collected 22 @ 17:13)  Source: .Blood Blood  Preliminary Report (22 @ 23:02):    No growth to date.        =============    CAPILLARY BLOOD GLUCOSE            LIVER FUNCTIONS - ( 08 May 2022 08:04 )  Alb: 4.2 g/dL / Pro: 6.7 g/dL / ALK PHOS: 72 U/L / ALT: 31 U/L / AST: 28 U/L / GGT: x             Culture - Blood (collected 06 May 2022 07:53)  Source: .Blood Blood  Preliminary Report (07 May 2022 15:06):    No growth to date.        Radiology:   IMAGING:             Hospital Day:  4d    Subjective:    No acute events overnight.    Chart reviewed, patient seen and examined at bedside in AM. Patient appears comfortable while at rest in bed. No other complaints.         Past Medical Hx:   Lupus    AICD (automatic cardioverter/defibrillator) present    Throat cancer    RA (rheumatoid arthritis)    HTN (hypertension)    CHF (congestive heart failure)    COPD (chronic obstructive pulmonary disease)    DVT (deep venous thrombosis)    Pulmonary embolus    Pulmonary embolism    History of laryngeal cancer    GERD (gastroesophageal reflux disease)    Cervical cancer      Past Sx:  S/P appendectomy    S/P cholecystectomy    History of laryngeal cancer    AICD (automatic cardioverter/defibrillator) present    S/P hysterectomy    History of back surgery    H/O foot surgery    S/P eye surgery    Subclavian arterial stenosis      Allergies:  adhesives (Rash; Urticaria)  Arava (Anaphylaxis; Angioedema)  Avelox (Other)  Plaquenil (Other)  Vantin (Other (Mild))    Current Meds:   Standng Meds:  albuterol/ipratropium for Nebulization 3 milliLiter(s) Nebulizer every 6 hours  aspirin enteric coated 81 milliGRAM(s) Oral daily  budesonide 160 MICROgram(s)/formoterol 4.5 MICROgram(s) Inhaler 2 Puff(s) Inhalation two times a day  clindamycin IVPB 600 milliGRAM(s) IV Intermittent every 8 hours  cyanocobalamin 1000 MICROGram(s) Oral daily  folic acid 1 milliGRAM(s) Oral daily  fondaparinux Injectable 7.5 milliGRAM(s) SubCutaneous daily  hydrALAZINE 50 milliGRAM(s) Oral two times a day  metoprolol succinate  milliGRAM(s) Oral daily  morphine ER Tablet 120 milliGRAM(s) Oral <User Schedule>  silver sulfADIAZINE 1% Cream 1 Application(s) Topical two times a day    PRN Meds:  acetaminophen     Tablet .. 650 milliGRAM(s) Oral every 6 hours PRN Temp greater or equal to 38C (100.4F), Mild Pain (1 - 3)  ALPRAZolam 1 milliGRAM(s) Oral every 6 hours PRN anxiety  aluminum hydroxide/magnesium hydroxide/simethicone Suspension 30 milliLiter(s) Oral every 4 hours PRN Dyspepsia  melatonin 3 milliGRAM(s) Oral at bedtime PRN Insomnia  ondansetron Injectable 4 milliGRAM(s) IV Push every 8 hours PRN Nausea and/or Vomiting    HOME MEDICATIONS:  albuterol 90 mcg/inh inhalation aerosol: 2 puff(s) inhaled every 6 hours  ALPRAZolam 1 mg oral tablet: 2 tab(s) orally 3 times a day, As Needed  Benlysta: 200 milligram(s) intravenous once a week  cyanocobalamin 1000 mcg/mL injectable solution: 1 milliliter(s) injectable once a week  folic acid 1 mg oral tablet: 1 tab(s) orally once a day  fondaparinux 7.5 mg/0.6 mL subcutaneous solution: 0.6 milliliter(s) subcutaneous once a day  hydrALAZINE 50 mg oral tablet: 1 tab(s) orally 2 times a day  Metoprolol Succinate  mg oral tablet, extended release: 1 tab(s) orally once a day  MS Contin 60 mg oral tablet, extended release: 2 tab(s) orally 3 times a day (after meals)  Reglan 10 mg oral tablet: 1 tab(s) orally 4 times a day (before meals and at bedtime)  Trelegy Ellipta 100 mcg-62.5 mcg-25 mcg/inh inhalation powder: 1 puff(s) inhaled once a day      Vital Signs:   T(F): 96.1 (22 @ 05:32), Max: 97.4 (22 @ 14:44)  HR: 73 (22 @ 05:32) (68 - 84)  BP: 130/61 (22 @ 05:32) (109/57 - 166/785)  RR: 18 (22 @ 05:32) (18 - 18)  SpO2: 95% (22 @ 08:30) (95% - 95%)      22 @ 07:01  -  22 @ 07:00  --------------------------------------------------------  IN: 0 mL / OUT: 2 mL / NET: -2 mL        Physical Exam:   CONSTITUTIONAL: Well-developed; well-nourished; in no acute distress, speaking in full sentences  HEAD: Normocephalic; atraumatic  EYES: PERRL, EOMI, no conjunctival erythema  NECK: Supple; non tender, FROM  CARD: +S1, S2 Regular rate and rhythm.  RESP: CTABL  ABD: soft nontender, nondistended, no rebound, no guarding, no rigidity  EXT: moves all extremities,  No clubbing, cyanosis or edema.   NEURO: Alert, oriented, grossly unremarkable, no focal deficits, cn ii-xii grossly intact  PSYCH: Cooperative, appropriate         Labs:                         14.7   6.37  )-----------( 170      ( 08 May 2022 08:04 )             44.4     Neutophil% 48.5, Lymphocyte% 34.1, Monocyte% 11.5, Bands% 0.3 22 @ 08:04    08 May 2022 08:04    137    |  99     |  17     ----------------------------<  103    4.5     |  24     |  1.0      Ca    9.8        08 May 2022 08:04  Phos  4.3       08 May 2022 08:04  Mg     1.7       08 May 2022 08:04    TPro  6.7    /  Alb  4.2    /  TBili  0.4    /  DBili  x      /  AST  28     /  ALT  31     /  AlkPhos  72     08 May 2022 08:04                    Urinalysis Basic - ( 08 May 2022 10:25 )    Color: Yellow / Appearance: Clear / S.028 / pH: x  Gluc: x / Ketone: Trace  / Bili: Negative / Urobili: <2 mg/dL   Blood: x / Protein: Trace / Nitrite: Negative   Leuk Esterase: Negative / RBC: 4 /HPF / WBC 2 /HPF   Sq Epi: x / Non Sq Epi: 13 /HPF / Bacteria: Negative          Culture - Blood (collected 22 @ 07:53)  Source: .Blood Blood  Preliminary Report (22 @ 15:06):    No growth to date.    Culture - Blood (collected 22 @ 17:13)  Source: .Blood Blood  Preliminary Report (22 @ 23:02):    No growth to date.    Culture - Blood (collected 22 @ 17:13)  Source: .Blood Blood  Preliminary Report (22 @ 23:02):    No growth to date.        =============    CAPILLARY BLOOD GLUCOSE            LIVER FUNCTIONS - ( 08 May 2022 08:04 )  Alb: 4.2 g/dL / Pro: 6.7 g/dL / ALK PHOS: 72 U/L / ALT: 31 U/L / AST: 28 U/L / GGT: x             Culture - Blood (collected 06 May 2022 07:53)  Source: .Blood Blood  Preliminary Report (07 May 2022 15:06):    No growth to date.        Radiology:   IMAGING:

## 2022-05-09 NOTE — PROGRESS NOTE ADULT - ASSESSMENT
51 year old woman with PMH of SLE  lupus with lupus anticoagulant, antiphospholipid syndrome,  rheumatoid arthritis, cervical cancer sp hysterectomy,  laryngeal cancer sp CT, RT, CHF s/p AICD+PPM, DVT/PE in 2015 on Fondaparinux, NSTEMI in 2015, subclavian stenosis s/p stent placement, HTN, HLD, PPM/AICD presented to the hospital for a wound on the calf of the left lower extremity. pt has had this wound since last week with swelling of the calf, erythema, fever chills. She's been taking Augmentin for the last 6 days but has been worsening with black eschar. pt went to Dr Guan who performed a duplex, was negative    A/P:   #Left lower cellulitis with small abscess" #Erythema, tenderness with small collection on distal medial calf,   -Blood cx no growth  -Arterial Duplex was normal.   -No fever, Continue Clindamycin IV 600mg q 8hrs. If collection is available will send wound cx.   -Burn evaluated     #Pain control   - resumed home pain medication    #History of VTE/ PE  -Antiphospholipid Antibody syndrome:   -continue Fondaparinux , patient developed DVT while on Coumadin and Xarelto.     #History of long-QT syndrome and VT sp AICD  -s/p AICD placement (s/p laser lead extraction of ICD leads and device and reimplanted with new RA/RV lead and new ICD device (Medtronic) in 2021)    #Chronic HFpEF  -Echo showed  EF 63% 2019    #SLE:   -RA    #COPD  - not in exacerbation  -Continue inhalers and nebs for COPD    DVT ppx: Fondaparinux  GI PPX not needed  DIET dash  Code status: full code  #Progress Note Handoff:  Pending (specify):  improving cellulitis   Family discussion:  Disposition: Home in 24 hrs.  51 year old woman with PMH of SLE  lupus with lupus anticoagulant, antiphospholipid syndrome,  rheumatoid arthritis, cervical cancer sp hysterectomy,  laryngeal cancer sp CT, RT, CHF s/p AICD+PPM, DVT/PE in 2015 on Fondaparinux, NSTEMI in 2015, subclavian stenosis s/p stent placement, HTN, HLD, PPM/AICD presented to the hospital for a wound on the calf of the left lower extremity. pt has had this wound since last week with swelling of the calf, erythema, fever chills. She's been taking Augmentin for the last 6 days but has been worsening with black eschar. pt went to Dr Guan who performed a duplex, was negative    A/P:   #Left lower cellulitis with small abscess" #Erythema, tenderness with small collection on distal medial calf,   -Blood cx no growth  -Arterial Duplex was normal.   -No fever, Continue Clindamycin IV 600mg q 8hrs. If collection is available will send wound cx.   -Burn evaluated   - ID (Dr. Novak) states possible vasculitis; consulted rheum per ID recs  - CT leg - for workup of possible vasculitis   - CT chest - for routine screening as per outpt pulm - patient states she prefers to have imaging at this time     #Pain control   - resumed home pain medication    #History of VTE/ PE  -Antiphospholipid Antibody syndrome:   -continue Fondaparinux , patient developed DVT while on Coumadin and Xarelto.     #History of long-QT syndrome and VT sp AICD  -s/p AICD placement (s/p laser lead extraction of ICD leads and device and reimplanted with new RA/RV lead and new ICD device (Medtronic) in 2021)    #Chronic HFpEF  -Echo showed  EF 63% 2019    #SLE:   -RA    #COPD  - not in exacerbation  -Continue inhalers and nebs for COPD    DVT ppx: Fondaparinux  GI PPX not needed  DIET dash  Code status: full code  #Progress Note Handoff:  Pending (specify):  improving cellulitis   Family discussion:  Disposition: Home in 24 hrs.  51 year old woman with PMH of SLE  lupus with lupus anticoagulant, antiphospholipid syndrome,  rheumatoid arthritis, cervical cancer sp hysterectomy,  laryngeal cancer sp CT, RT, CHF s/p AICD+PPM, DVT/PE in 2015 on Fondaparinux, NSTEMI in 2015, subclavian stenosis s/p stent placement, HTN, HLD, PPM/AICD presented to the hospital for a wound on the calf of the left lower extremity. pt has had this wound since last week with swelling of the calf, erythema, fever chills. She's been taking Augmentin for the last 6 days but has been worsening with black eschar. pt went to Dr Guan who performed a duplex, was negative    A/P:   #Left lower cellulitis with small abscess" #Erythema, tenderness with small collection on distal medial calf,   -Blood cx no growth  -Arterial Duplex was normal.   -No fever, Continue Clindamycin IV 600mg q 8hrs. If collection is available will send wound cx.   -Burn evaluated   - ID (Dr. Novak) states possible vasculitis; consulted rheum per ID recs  - CT leg - for workup of possible vasculitis   - CT chest - for routine screening as per outpt pulm - patient states she prefers to have imaging at this time   - f/u rheum    #Pain control   - resumed home pain medication    #History of VTE/ PE  -Antiphospholipid Antibody syndrome:   -continue Fondaparinux , patient developed DVT while on Coumadin and Xarelto.     #History of long-QT syndrome and VT sp AICD  -s/p AICD placement (s/p laser lead extraction of ICD leads and device and reimplanted with new RA/RV lead and new ICD device (Medtronic) in 2021)    #Chronic HFpEF  -Echo showed  EF 63% 2019    #SLE:   -RA    #COPD  - not in exacerbation  -Continue inhalers and nebs for COPD    DVT ppx: Fondaparinux  GI PPX not needed  DIET dash  Code status: full code  #Progress Note Handoff:  Pending (specify):  improving cellulitis   Family discussion:  Disposition: Home in 24 hrs.  51 year old woman with PMH of SLE  lupus with lupus anticoagulant, antiphospholipid syndrome,  rheumatoid arthritis, cervical cancer sp hysterectomy,  laryngeal cancer sp CT, RT, CHF s/p AICD+PPM, DVT/PE in 2015 on Fondaparinux, NSTEMI in 2015, subclavian stenosis s/p stent placement, HTN, HLD, PPM/AICD presented to the hospital for a wound on the calf of the left lower extremity. pt has had this wound since last week with swelling of the calf, erythema, fever chills. She's been taking Augmentin for the last 6 days but has been worsening with black eschar. pt went to Dr Guan who performed a duplex, was negative    A/P:   #Left lower cellulitis with small abscess" #Erythema, tenderness with small collection on distal medial calf,   -Blood cx no growth  -Arterial Duplex was normal.   -No fever, Continue Clindamycin IV 600mg q 8hrs. If collection is available will send wound cx.   -Burn evaluated   - ID (Dr. Novak) states possible vasculitis; consulted rheum per ID recs  - CT leg w/ contrast - for workup of possible vasculitis  (5/9)  - consulted rheum to r/o vasculitis as per ID (5/9)  - f/u rheum labs (e.g. C3, C4, etc.) (collecting 5/9)   - f/u CTA LE with Iv contrast to r/o vasculitis per rheum (5/9)     #Pain control   - resumed home pain medication    #electrolytes  - repleted mag (5/9)    #History of VTE/ PE  -Antiphospholipid Antibody syndrome:   -continue Fondaparinux , patient developed DVT while on Coumadin and Xarelto.     #History of long-QT syndrome and VT sp AICD  -s/p AICD placement (s/p laser lead extraction of ICD leads and device and reimplanted with new RA/RV lead and new ICD device (Medtronic) in 2021)    #Chronic HFpEF  -Echo showed  EF 63% 2019    #SLE:   -RA    #COPD  - not in exacerbation  -Continue inhalers and nebs for COPD    DVT ppx: Fondaparinux  GI PPX not needed  DIET dash  Code status: full code  #Progress Note Handoff:  Pending (specify):  improving cellulitis   Family discussion:  Disposition: Home in 24 hrs.

## 2022-05-09 NOTE — CONSULT NOTE ADULT - SUBJECTIVE AND OBJECTIVE BOX
Patient is a 51y old  Female who presents with a chief complaint of left lower extremity wound (09 May 2022 13:36)    rheumatology consulted to r/o SLE and vasculitis of LLE    describes having fatigue, persistent joint pain and stiffness, no oral ulcers, no joint swelling, no hair loss, but she said that he has been trying to take several medications but nothing helped her in her symptoms. last seen by dr mckeon, dr osborn 4 years ago. she was given cellcept, several courses of prednisone, methotrexate and bemulimab w/o any benefit. she mentions having allergic reaction to plaquenil with angioedema and neck swelling     VITAL SIGNS:  Vital Signs Last 24 Hrs  T(C): 35.6 (09 May 2022 05:32), Max: 36.3 (08 May 2022 14:44)  T(F): 96.1 (09 May 2022 05:32), Max: 97.4 (08 May 2022 14:44)  HR: 73 (09 May 2022 05:32) (68 - 73)  BP: 130/61 (09 May 2022 05:32) (109/57 - 166/785)  BP(mean): --  RR: 18 (09 May 2022 05:32) (18 - 18)  SpO2: --    PHYSICAL EXAM:    General: looks in mild pain   no joint stiffness or swelling   HEENT: NC/AT; PERRL, clear conjunctiva, no oral ulcers   Neck: supple  Cardiovascular: + S1S2, no murmurs   Respiratory: bilateral basal crackles   Gastrointestinal: soft, NT/ND; +BSx4  Extremities: WWP; 2+ peripheral pulses; ++ ecchymoses and bruises of LLE, + cutaneous swelling and redness, no signs of erythema nodosum  Neurological: AAOx3; no focal deficits    MEDICATIONS:  MEDICATIONS  (STANDING):  albuterol/ipratropium for Nebulization 3 milliLiter(s) Nebulizer every 6 hours  aspirin enteric coated 81 milliGRAM(s) Oral daily  budesonide 160 MICROgram(s)/formoterol 4.5 MICROgram(s) Inhaler 2 Puff(s) Inhalation two times a day  clindamycin IVPB 600 milliGRAM(s) IV Intermittent every 8 hours  cyanocobalamin 1000 MICROGram(s) Oral daily  folic acid 1 milliGRAM(s) Oral daily  fondaparinux Injectable 7.5 milliGRAM(s) SubCutaneous daily  hydrALAZINE 50 milliGRAM(s) Oral two times a day  metoprolol succinate  milliGRAM(s) Oral daily  morphine ER Tablet 120 milliGRAM(s) Oral <User Schedule>  silver sulfADIAZINE 1% Cream 1 Application(s) Topical two times a day    MEDICATIONS  (PRN):  acetaminophen     Tablet .. 650 milliGRAM(s) Oral every 6 hours PRN Temp greater or equal to 38C (100.4F), Mild Pain (1 - 3)  ALPRAZolam 1 milliGRAM(s) Oral every 6 hours PRN anxiety  aluminum hydroxide/magnesium hydroxide/simethicone Suspension 30 milliLiter(s) Oral every 4 hours PRN Dyspepsia  melatonin 3 milliGRAM(s) Oral at bedtime PRN Insomnia  ondansetron Injectable 4 milliGRAM(s) IV Push every 8 hours PRN Nausea and/or Vomiting      ALLERGIES:  Allergies    adhesives (Rash; Urticaria)  Arava (Anaphylaxis; Angioedema)  Avelox (Other)  Plaquenil (Other)  Vantin (Other (Mild))    Intolerances        LABS:                        14.7   7.77  )-----------( 196      ( 09 May 2022 06:48 )             44.8     05-    137  |  98  |  17  ----------------------------<  99  4.7   |  27  |  0.9    Ca    9.9      09 May 2022 06:48  Phos  4.4     05-  Mg     1.6         TPro  6.7  /  Alb  4.4  /  TBili  0.3  /  DBili  x   /  AST  21  /  ALT  27  /  AlkPhos  95  05-09      Urinalysis Basic - ( 08 May 2022 10:25 )    Color: Yellow / Appearance: Clear / S.028 / pH: x  Gluc: x / Ketone: Trace  / Bili: Negative / Urobili: <2 mg/dL   Blood: x / Protein: Trace / Nitrite: Negative   Leuk Esterase: Negative / RBC: 4 /HPF / WBC 2 /HPF   Sq Epi: x / Non Sq Epi: 13 /HPF / Bacteria: Negative      CAPILLARY BLOOD GLUCOSE

## 2022-05-09 NOTE — PROGRESS NOTE ADULT - SUBJECTIVE AND OBJECTIVE BOX
ILEANA MCNEIL  51y, Female  Allergy: adhesives (Rash; Urticaria)  Arava (Anaphylaxis; Angioedema)  Avelox (Other)  Plaquenil (Other)  Vantin (Other (Mild))      LOS  4d    CHIEF COMPLAINT: left lower extremity wound (09 May 2022 05:51)      INTERVAL EVENTS/HPI  - No acute events overnight- worsening calf pain  - T(F): , Max: 97.4 (22 @ 14:44)  - Denies any worsening symptoms  - Tolerating medication  - WBC Count: 7.77 (22 @ 06:48)  WBC Count: 6.37 (22 @ 08:04)     - Creatinine, Serum: 0.9 (22 @ 06:48)  Creatinine, Serum: 1.0 (22 @ 08:04)       ROS  General: Denies rigors, nightsweats  HEENT: Denies headache, rhinorrhea, sore throat, eye pain  CV: Denies CP, palpitations  PULM: Denies wheezing, hemoptysis  GI: Denies hematemesis, hematochezia, melena  : Denies discharge, hematuria  MSK: Denies arthralgias, myalgias  SKIN: Denies rash, lesions  NEURO: Denies paresthesias, weakness  PSYCH: Denies depression, anxiety    VITALS:  T(F): 96.1, Max: 97.4 (22 @ 14:44)  HR: 73  BP: 130/61  RR: 18Vital Signs Last 24 Hrs  T(C): 35.6 (09 May 2022 05:32), Max: 36.3 (08 May 2022 14:44)  T(F): 96.1 (09 May 2022 05:32), Max: 97.4 (08 May 2022 14:44)  HR: 73 (09 May 2022 05:32) (68 - 73)  BP: 130/61 (09 May 2022 05:32) (109/57 - 166/785)  BP(mean): --  RR: 18 (09 May 2022 05:32) (18 - 18)  SpO2: --    PHYSICAL EXAM:  Gen: NAD, resting in bed  HEENT: Normocephalic, atraumatic  Neck: supple, no lymphadenopathy  CV: Regular rate & regular rhythm  Lungs: decreased BS at bases, no fremitus  Abdomen: Soft, BS present  Ext: Warm, well perfused, calf lesion resolving however worsening edema and ecchymoses of the posterior calf  Neuro: non focal, awake  Skin: no rash, no erythema  Lines: no phlebitis    FH: Non-contributory  Social Hx: Non-contributory    TESTS & MEASUREMENTS:                        14.7   7.77  )-----------( 196      ( 09 May 2022 06:48 )             44.8         137  |  98  |  17  ----------------------------<  99  4.7   |  27  |  0.9    Ca    9.9      09 May 2022 06:48  Phos  4.4       Mg     1.6         TPro  6.7  /  Alb  4.4  /  TBili  0.3  /  DBili  x   /  AST  21  /  ALT  27  /  AlkPhos  95        LIVER FUNCTIONS - ( 09 May 2022 06:48 )  Alb: 4.4 g/dL / Pro: 6.7 g/dL / ALK PHOS: 95 U/L / ALT: 27 U/L / AST: 21 U/L / GGT: x           Urinalysis Basic - ( 08 May 2022 10:25 )    Color: Yellow / Appearance: Clear / S.028 / pH: x  Gluc: x / Ketone: Trace  / Bili: Negative / Urobili: <2 mg/dL   Blood: x / Protein: Trace / Nitrite: Negative   Leuk Esterase: Negative / RBC: 4 /HPF / WBC 2 /HPF   Sq Epi: x / Non Sq Epi: 13 /HPF / Bacteria: Negative        Culture - Blood (collected 22 @ 07:53)  Source: .Blood Blood  Preliminary Report (22 @ 15:06):    No growth to date.    Culture - Blood (collected 22 @ 17:13)  Source: .Blood Blood  Preliminary Report (22 @ 23:02):    No growth to date.    Culture - Blood (collected 22 @ 17:13)  Source: .Blood Blood  Preliminary Report (22 @ 23:02):    No growth to date.        Lactate, Blood: 0.6 mmol/L (22 @ 17:08)      INFECTIOUS DISEASES TESTING  COVID-19 PCR: NotDetec (22 @ 18:42)  COVID-19 PCR: NotDetec (22 @ 20:23)      INFLAMMATORY MARKERS  Sedimentation Rate, Erythrocyte: 11 mm/Hr (22 @ 07:53)  C-Reactive Protein, Serum: 14 mg/L (22 @ 07:53)  Sedimentation Rate, Erythrocyte: 12 mm/Hr (22 @ 17:08)      RADIOLOGY & ADDITIONAL TESTS:  I have personally reviewed the last available Chest xray  CXR      CT      CARDIOLOGY TESTING      MEDICATIONS  albuterol/ipratropium for Nebulization 3 Nebulizer every 6 hours  aspirin enteric coated 81 Oral daily  budesonide 160 MICROgram(s)/formoterol 4.5 MICROgram(s) Inhaler 2 Inhalation two times a day  clindamycin IVPB 600 IV Intermittent every 8 hours  cyanocobalamin 1000 Oral daily  folic acid 1 Oral daily  fondaparinux Injectable 7.5 SubCutaneous daily  hydrALAZINE 50 Oral two times a day  metoprolol succinate  Oral daily  morphine ER Tablet 120 Oral <User Schedule>  silver sulfADIAZINE 1% Cream 1 Topical two times a day      WEIGHT  Weight (kg): 71.2 (22 @ 14:33)  Creatinine, Serum: 0.9 mg/dL (22 @ 06:48)      ANTIBIOTICS:  clindamycin IVPB 600 milliGRAM(s) IV Intermittent every 8 hours      All available historical records have been reviewed

## 2022-05-09 NOTE — CONSULT NOTE ADULT - ASSESSMENT
51 year old woman with PMH of SLE  lupus with lupus anticoagulant, antiphospholipid syndrome,  rheumatoid arthritis, cervical cancer sp hysterectomy,  laryngeal cancer sp CT, RT, CHF s/p AICD+PPM, DVT/PE in 2015 on Fondaparinux, NSTEMI in 2015, subclavian stenosis s/p stent placement, HTN, HLD, PPM/AICD presented to the hospital for a wound on the calf of the left lower extremity. pt has had this wound since last week with swelling of the calf, erythema, fever, chills. She's been taking Augmentin for the last 6 days but has been worsening with black eschar. pt went to Dr Guan who performed a duplex, was negative  rheumatology consulted for SLE and possible vasculitis of LLE  describes having fatigue, persistent joint pain and stiffness, no oral ulcers, no joint swelling, no hair loss, but she said that he has been trying to take several medications but nothing helped her in her symptoms. last seen by dr mckeon, dr osborn 4 years ago. she was given cellcept, several courses of prednisone, methotrexate and bemulimab w/o any benefit. she mentions having allergic reaction to plaquenil with angioedema and neck swelling     # SLE with lupus anticoagulant :  - not in acute flare, but disease does not look under control  - no signs of erythema nodosum or vasculitis on exam   - check DsDNA, YOON, C3, C4, anti Sm, CRP, ESR, anti SSA, anti SSB, Anti-RNP  - last RF < 10 and anti CCP negative   - has been on cellcept, methotrexate, prednisone in the past   - received belimumab w/o any benefit    - cw fondaparinux + check anti XA level to assess for adequate AC and follow up with Dr neil as OP      51 year old woman with PMH of SLE  lupus with lupus anticoagulant, antiphospholipid syndrome,  rheumatoid arthritis, cervical cancer sp hysterectomy,  laryngeal cancer sp CT, RT, CHF s/p AICD+PPM, DVT/PE in 2015 on Fondaparinux, NSTEMI in 2015, subclavian stenosis s/p stent placement, HTN, HLD, PPM/AICD presented to the hospital for a wound on the calf of the left lower extremity. pt has had this wound since last week with swelling of the calf, erythema, fever, chills. She's been taking Augmentin for the last 6 days but has been worsening with black eschar. pt went to Dr Guan who performed a duplex, was negative  rheumatology consulted for SLE and possible vasculitis of LLE  describes having fatigue, persistent joint pain and stiffness, no oral ulcers, no joint swelling, no hair loss, but she said that he has been trying to take several medications but nothing helped her in her symptoms. last seen by dr mckeon, dr osborn 4 years ago. she was given cellcept, several courses of prednisone, methotrexate and bemulimab w/o any benefit. she mentions having allergic reaction to plaquenil with angioedema and neck swelling     # SLE with lupus anticoagulant :  - not in acute flare, but disease does not look under control  - no signs of erythema nodosum or vasculitis on exam   - check DsDNA, YOON, C3, C4, anti Sm, CRP, ESR, anti SSA, anti SSB, Anti-RNP, anti beta 2 glycoprotein   - last RF < 10 and anti CCP negative   - has been on cellcept, methotrexate, prednisone in the past   - received belimumab w/o any benefit    - cw fondaparinux + check anti XA level to assess for adequate AC and follow up with Dr neil as OP   - may need CTA LE with Iv contrast to r/o vasculitis??  - will need skin biopsy depending on the result of CTA     51 year old woman with PMH of SLE  lupus with lupus anticoagulant, antiphospholipid syndrome,  rheumatoid arthritis, cervical cancer sp hysterectomy,  laryngeal cancer sp CT, RT, CHF s/p AICD+PPM, DVT/PE in 2015 on Fondaparinux, NSTEMI in 2015, subclavian stenosis s/p stent placement, HTN, HLD, PPM/AICD presented to the hospital for a wound on the calf of the left lower extremity. pt has had this wound since last week with swelling of the calf, erythema, fever, chills. She's been taking Augmentin for the last 6 days but has been worsening with black eschar. pt went to Dr Guan who performed a duplex, was negative  rheumatology consulted for SLE and possible vasculitis of LLE  describes having fatigue, persistent joint pain and stiffness, no oral ulcers, no joint swelling, no hair loss, but she said that he has been trying to take several medications but nothing helped her in her symptoms. last seen by dr mckeon, dr osborn 4 years ago. she was given cellcept, several courses of prednisone, methotrexate and bemulimab w/o any benefit. she mentions having allergic reaction to plaquenil with angioedema and neck swelling     # SLE with lupus anticoagulant :  - not in acute flare, but disease does not look under control  - no signs of erythema nodosum or vasculitis on exam   - check ANCA, DsDNA, YOON, C3, C4, anti Sm, CRP, ESR, anti SSA, anti SSB, Anti-RNP  - last RF < 10 and anti CCP negative   - has been on cellcept, methotrexate, prednisone in the past   - received belimumab w/o any benefit    - cw fondaparinux + check anti XA level to assess for adequate AC and follow up with Dr neil as OP   - will need skin biopsy to rule out vasculitis     51 year old woman with PMH of SLE  lupus with lupus anticoagulant, antiphospholipid syndrome,  rheumatoid arthritis, cervical cancer sp hysterectomy,  laryngeal cancer sp CT, RT, CHF s/p AICD+PPM, DVT/PE in 2015 on Fondaparinux, NSTEMI in 2015, subclavian stenosis s/p stent placement, HTN, HLD, PPM/AICD presented to the hospital for a wound on the calf of the left lower extremity. pt has had this wound since last week with swelling of the calf, erythema, fever, chills. She's been taking Augmentin for the last 6 days but has been worsening with black eschar. pt went to Dr Guan who performed a duplex, was negative  rheumatology consulted for SLE and possible vasculitis of LLE  describes having fatigue, persistent joint pain and stiffness, no oral ulcers, no joint swelling, no hair loss, but she said that he has been trying to take several medications but nothing helped her in her symptoms. last seen by dr mckeon, dr osborn 4 years ago. she was given cellcept, several courses of prednisone, methotrexate and bemulimab w/o any benefit. she mentions having allergic reaction to plaquenil with angioedema and neck swelling     # SLE with lupus anticoagulant :  - not in acute flare, but disease does not look under control  - no signs of erythema nodosum or vasculitis on exam   - check ANCA, DsDNA, YOON, C3, C4, anti Sm, CRP, ESR, anti SSA, anti SSB, Anti-RNP  - last RF < 10 and anti CCP negative   - has been on cellcept, methotrexate, prednisone in the past   - received belimumab w/o any benefit    - cw fondaparinux + check anti XA level to assess for adequate AC and follow up with Dr neil as OP   - may need CTA LLE w/ IV contrast for vasculitis??  - will need skin biopsy

## 2022-05-09 NOTE — PROGRESS NOTE ADULT - SUBJECTIVE AND OBJECTIVE BOX
Patient is a 51y old  Female who presents with a chief complaint of left lower extremity wound (09 May 2022 13:37)    INTERVAL HPI/OVERNIGHT EVENTS: Patient was examined and seen at bedside. This morning pt is resting comfortably in bed and reports persistent Lt calf pain. Over the weekend the lesion drained some blood and ?pus. Overall better but pain persists. Not in the room on multiple occasions.  ROS: Denies CP, SOB, AP, new weakness  All other systems reviewed and are within normal limits.  InitialHPI:  51 year old woman with PMH of SLE  lupus with lupus anticoagulant, antiphospholipid syndrome,  rheumatoid arthritis, cervical cancer sp hysterectomy,  laryngeal cancer sp CT, RT, CHF s/p AICD+PPM, DVT/PE in  on Fondaparinux, NSTEMI in 2015, subclavian stenosis s/p stent placement, HTN, HLD, PPM/AICD presented to the hospital for a wound on the calf of the left lower extremity. pt has had this wound since last week with swelling of the calf, erythema, fever chills. She's been taking Augmentin for the last 6 days but has been worsening with black eschar. pt went to Dr Guan who performed a duplex, was negative  in the ED,pt's VS T(C): 36.8 (22 @ 14:33), Max: 36.8 (22 @ 14:33)  HR: 87 (22 @ 15:58) (87 - 114)  BP: 138/67 (22 @ 15:58) (138/67 - 142/91)  RR: 18 (22 @ 14:33) (18 - 18)  SpO2: 96% (22 @ 14:33) (96% - 96%), labs done were unremarkable. Duplex done as outpatient was negative.   pt received aztreonam, metronidazole, Vancomycin.   pt is admitted for workup/management (05 May 2022 20:56)    PAST MEDICAL & SURGICAL HISTORY:  Lupus    RA (rheumatoid arthritis)    HTN (hypertension)    CHF (congestive heart failure)    COPD (chronic obstructive pulmonary disease)    DVT (deep venous thrombosis)    Pulmonary embolism    History of laryngeal cancer    GERD (gastroesophageal reflux disease)    Cervical cancer    S/P appendectomy    S/P cholecystectomy    AICD (automatic cardioverter/defibrillator) present    S/P hysterectomy    History of back surgery    H/O foot surgery    S/P eye surgery    Subclavian arterial stenosis        General: NAD, AAO3  HEENT:  EOMI, no LAD  CV: S1 S2  Resp: decreased breath sounds at bases  GI: NT/ND/S +BS  MS: Lt calf +echymoses, tenderness, decreased swelling and erythema  Neuro: nonfocal, +reflexes thruout    MEDICATIONS  (STANDING):  albuterol/ipratropium for Nebulization 3 milliLiter(s) Nebulizer every 6 hours  aspirin enteric coated 81 milliGRAM(s) Oral daily  budesonide 160 MICROgram(s)/formoterol 4.5 MICROgram(s) Inhaler 2 Puff(s) Inhalation two times a day  clindamycin IVPB 600 milliGRAM(s) IV Intermittent every 8 hours  cyanocobalamin 1000 MICROGram(s) Oral daily  folic acid 1 milliGRAM(s) Oral daily  fondaparinux Injectable 7.5 milliGRAM(s) SubCutaneous daily  hydrALAZINE 50 milliGRAM(s) Oral two times a day  metoprolol succinate  milliGRAM(s) Oral daily  morphine ER Tablet 120 milliGRAM(s) Oral <User Schedule>  silver sulfADIAZINE 1% Cream 1 Application(s) Topical two times a day    MEDICATIONS  (PRN):  acetaminophen     Tablet .. 650 milliGRAM(s) Oral every 6 hours PRN Temp greater or equal to 38C (100.4F), Mild Pain (1 - 3)  ALPRAZolam 1 milliGRAM(s) Oral every 6 hours PRN anxiety  aluminum hydroxide/magnesium hydroxide/simethicone Suspension 30 milliLiter(s) Oral every 4 hours PRN Dyspepsia  melatonin 3 milliGRAM(s) Oral at bedtime PRN Insomnia  ondansetron Injectable 4 milliGRAM(s) IV Push every 8 hours PRN Nausea and/or Vomiting    Vital Signs Last 24 Hrs  T(C): 35.8 (09 May 2022 14:06), Max: 36.1 (08 May 2022 21:33)  T(F): 96.4 (09 May 2022 14:06), Max: 97 (08 May 2022 21:33)  HR: 70 (09 May 2022 14:06) (68 - 73)  BP: 122/59 (09 May 2022 14:06) (122/59 - 166/785)  BP(mean): --  RR: 16 (09 May 2022 14:06) (16 - 18)  SpO2: --  CAPILLARY BLOOD GLUCOSE                              14.7   7.77  )-----------( 196      ( 09 May 2022 06:48 )             44.8         137  |  98  |  17  ----------------------------<  99  4.7   |  27  |  0.9    Ca    9.9      09 May 2022 06:48  Phos  4.4     05-  Mg     1.6     -    TPro  6.7  /  Alb  4.4  /  TBili  0.3  /  DBili  x   /  AST  21  /  ALT  27  /  AlkPhos  95  05-09    LIVER FUNCTIONS - ( 09 May 2022 06:48 )  Alb: 4.4 g/dL / Pro: 6.7 g/dL / ALK PHOS: 95 U/L / ALT: 27 U/L / AST: 21 U/L / GGT: x                 Urinalysis Basic - ( 08 May 2022 10:25 )    Color: Yellow / Appearance: Clear / S.028 / pH: x  Gluc: x / Ketone: Trace  / Bili: Negative / Urobili: <2 mg/dL   Blood: x / Protein: Trace / Nitrite: Negative   Leuk Esterase: Negative / RBC: 4 /HPF / WBC 2 /HPF   Sq Epi: x / Non Sq Epi: 13 /HPF / Bacteria: Negative              Culture - Urine (collected 08 May 2022 10:25)  Source: Clean Catch Clean Catch (Midstream)  Final Report (09 May 2022 14:29):    No growth      Chart, Consultant(s) Notes Reviewed:  [x ] YES  [ ] NO  Care Discussed with Consultants/Other Providers/ Housestaff [ x] YES  [ ] NO  Radiology, labs, old available records personally reviewed.

## 2022-05-09 NOTE — PROGRESS NOTE ADULT - ASSESSMENT
51 year old woman with PMH of SLE  lupus with lupus anticoagulant, antiphospholipid syndrome,  rheumatoid arthritis, cervical cancer sp hysterectomy,  laryngeal cancer sp CT, RT, CHF s/p AICD+PPM, DVT/PE in 2015 on Fondaparinux, NSTEMI in 2015, subclavian stenosis s/p stent placement, HTN, HLD, PPM/AICD presented to the hospital for a wound on the calf of the left lower extremity. pt has had this wound since last week with swelling of the calf, erythema, fever chills. She's been taking Augmentin for the last 6 days but has been worsening with black eschar. pt went to Dr Guan who performed a duplex, was negative    A/P:   #Left lower lesion ?cellulitis with small abscess   -Blood cx no growth  -Arterial Duplex was normal.   -No fever, Continue Clindamycin IV 600mg q 8hrs. If collection is available will send wound cx.   -Burn evaluated   - ID (Dr. Novak) states possible vasculitis; consulted rheum per ID recs  - consulted rheum to r/o vasculitis as per ID (5/9)  - f/u rheum labs (e.g. C3, C4, etc.) (collecting 5/9)   - f/u CTA LE with Iv contrast     #History of VTE/ PE on fondaparinux  - continue Fondaparinux for now.   -reports resolution of LUE edema    #History of long-QT syndrome and VT sp AICD  #HFpEF  - EF 63% 2019  - EP on board  - s/p AICD placement (s/p laser lead extraction of ICD leads and device and reimplanted with new RA/RV lead and new ICD device ([employer]) in 2021)    #SLE/RA  #APLAS  - not in flare  - on Fondaparinux  -rheum f/u    #COPD  - inhalers and nebs for COPD   -counselled pt to avoid smoking and secondary exposure    #Chronic pain syndrome  -I-Stop checked and meds ordered    #anxiety  -Xanax PRN    #MISC  DVT ppx: Fondaparinux  GI PPX not needed  DIET dash  Code status: full code    #Progress Note Handoff  Pending: Consults____Clinical improvement and stability__x__CT Lt calf_  Pt/Family discussion: Pt/ informed and agree with the current plan  Disposition: Home__    My note supersedes the residents note should a discrepancy arise.    Chart and notes personally reviewed.  Care Discussed with Consultants/Other Providers/ Housestaff [ x] YES [ ] NO   Radiology, labs, old records personally reviewed.    discussed w/ housestaff, nursing, case management,      Risk Statement (NON-critical care).     On this date of service, level of risk to patient is considered: High.     Due to: infection in immunocompromised pt.     Time-based billing (NON-critical care).     35 minutes spent on total encounter; more than 50% of the visit was spent counseling and / or coordinating care by the attending physician.  The necessity of the time spent during the encounter on this date of service was due to:     time spent on review of labs, imaging studies, old records, obtaining history, personally examining patient, counselling and communicating with patient/ family, entering orders for medications/tests/etc, discussions with other health care providers, documentation in electronic health records, independent interpretation of labs, imaging/procedure results and care coordination.

## 2022-05-10 LAB
ALBUMIN SERPL ELPH-MCNC: 4.2 G/DL — SIGNIFICANT CHANGE UP (ref 3.5–5.2)
ALP SERPL-CCNC: 83 U/L — SIGNIFICANT CHANGE UP (ref 30–115)
ALT FLD-CCNC: 20 U/L — SIGNIFICANT CHANGE UP (ref 0–41)
ANION GAP SERPL CALC-SCNC: 17 MMOL/L — HIGH (ref 7–14)
AST SERPL-CCNC: 21 U/L — SIGNIFICANT CHANGE UP (ref 0–41)
BASOPHILS # BLD AUTO: 0.05 K/UL — SIGNIFICANT CHANGE UP (ref 0–0.2)
BASOPHILS NFR BLD AUTO: 0.8 % — SIGNIFICANT CHANGE UP (ref 0–1)
BILIRUB SERPL-MCNC: 0.3 MG/DL — SIGNIFICANT CHANGE UP (ref 0.2–1.2)
BUN SERPL-MCNC: 15 MG/DL — SIGNIFICANT CHANGE UP (ref 10–20)
CALCIUM SERPL-MCNC: 9.6 MG/DL — SIGNIFICANT CHANGE UP (ref 8.5–10.1)
CHLORIDE SERPL-SCNC: 99 MMOL/L — SIGNIFICANT CHANGE UP (ref 98–110)
CO2 SERPL-SCNC: 21 MMOL/L — SIGNIFICANT CHANGE UP (ref 17–32)
CREAT SERPL-MCNC: 0.9 MG/DL — SIGNIFICANT CHANGE UP (ref 0.7–1.5)
CRP SERPL-MCNC: 12 MG/L — HIGH
CULTURE RESULTS: SIGNIFICANT CHANGE UP
CULTURE RESULTS: SIGNIFICANT CHANGE UP
EGFR: 77 ML/MIN/1.73M2 — SIGNIFICANT CHANGE UP
EOSINOPHIL # BLD AUTO: 0.31 K/UL — SIGNIFICANT CHANGE UP (ref 0–0.7)
EOSINOPHIL NFR BLD AUTO: 5.1 % — SIGNIFICANT CHANGE UP (ref 0–8)
ERYTHROCYTE [SEDIMENTATION RATE] IN BLOOD: 19 MM/HR — SIGNIFICANT CHANGE UP (ref 0–20)
GLUCOSE SERPL-MCNC: 94 MG/DL — SIGNIFICANT CHANGE UP (ref 70–99)
HCT VFR BLD CALC: 42.8 % — SIGNIFICANT CHANGE UP (ref 37–47)
HGB BLD-MCNC: 14.6 G/DL — SIGNIFICANT CHANGE UP (ref 12–16)
IMM GRANULOCYTES NFR BLD AUTO: 0.3 % — SIGNIFICANT CHANGE UP (ref 0.1–0.3)
LYMPHOCYTES # BLD AUTO: 2.14 K/UL — SIGNIFICANT CHANGE UP (ref 1.2–3.4)
LYMPHOCYTES # BLD AUTO: 35.1 % — SIGNIFICANT CHANGE UP (ref 20.5–51.1)
MAGNESIUM SERPL-MCNC: 2 MG/DL — SIGNIFICANT CHANGE UP (ref 1.8–2.4)
MCHC RBC-ENTMCNC: 29.4 PG — SIGNIFICANT CHANGE UP (ref 27–31)
MCHC RBC-ENTMCNC: 34.1 G/DL — SIGNIFICANT CHANGE UP (ref 32–37)
MCV RBC AUTO: 86.1 FL — SIGNIFICANT CHANGE UP (ref 81–99)
MONOCYTES # BLD AUTO: 0.62 K/UL — HIGH (ref 0.1–0.6)
MONOCYTES NFR BLD AUTO: 10.2 % — HIGH (ref 1.7–9.3)
MRSA PCR RESULT.: NEGATIVE — SIGNIFICANT CHANGE UP
NEUTROPHILS # BLD AUTO: 2.95 K/UL — SIGNIFICANT CHANGE UP (ref 1.4–6.5)
NEUTROPHILS NFR BLD AUTO: 48.5 % — SIGNIFICANT CHANGE UP (ref 42.2–75.2)
NRBC # BLD: 0 /100 WBCS — SIGNIFICANT CHANGE UP (ref 0–0)
PHOSPHATE SERPL-MCNC: 4.7 MG/DL — SIGNIFICANT CHANGE UP (ref 2.1–4.9)
PLATELET # BLD AUTO: 183 K/UL — SIGNIFICANT CHANGE UP (ref 130–400)
POTASSIUM SERPL-MCNC: 4.5 MMOL/L — SIGNIFICANT CHANGE UP (ref 3.5–5)
POTASSIUM SERPL-SCNC: 4.5 MMOL/L — SIGNIFICANT CHANGE UP (ref 3.5–5)
PROT SERPL-MCNC: 6.7 G/DL — SIGNIFICANT CHANGE UP (ref 6–8)
RBC # BLD: 4.97 M/UL — SIGNIFICANT CHANGE UP (ref 4.2–5.4)
RBC # FLD: 13.1 % — SIGNIFICANT CHANGE UP (ref 11.5–14.5)
SODIUM SERPL-SCNC: 137 MMOL/L — SIGNIFICANT CHANGE UP (ref 135–146)
SPECIMEN SOURCE: SIGNIFICANT CHANGE UP
SPECIMEN SOURCE: SIGNIFICANT CHANGE UP
WBC # BLD: 6.09 K/UL — SIGNIFICANT CHANGE UP (ref 4.8–10.8)
WBC # FLD AUTO: 6.09 K/UL — SIGNIFICANT CHANGE UP (ref 4.8–10.8)

## 2022-05-10 PROCEDURE — 93970 EXTREMITY STUDY: CPT | Mod: 26

## 2022-05-10 PROCEDURE — 99233 SBSQ HOSP IP/OBS HIGH 50: CPT

## 2022-05-10 RX ORDER — SIMETHICONE 80 MG/1
80 TABLET, CHEWABLE ORAL
Refills: 0 | Status: DISCONTINUED | OUTPATIENT
Start: 2022-05-10 | End: 2022-05-15

## 2022-05-10 RX ORDER — ONDANSETRON 8 MG/1
4 TABLET, FILM COATED ORAL EVERY 8 HOURS
Refills: 0 | Status: DISCONTINUED | OUTPATIENT
Start: 2022-05-10 | End: 2022-05-15

## 2022-05-10 RX ADMIN — MORPHINE SULFATE 120 MILLIGRAM(S): 50 CAPSULE, EXTENDED RELEASE ORAL at 20:44

## 2022-05-10 RX ADMIN — MORPHINE SULFATE 120 MILLIGRAM(S): 50 CAPSULE, EXTENDED RELEASE ORAL at 13:36

## 2022-05-10 RX ADMIN — Medication 100 MILLIGRAM(S): at 05:56

## 2022-05-10 RX ADMIN — Medication 1 APPLICATION(S): at 05:56

## 2022-05-10 RX ADMIN — Medication 1 MILLIGRAM(S): at 11:55

## 2022-05-10 RX ADMIN — PREGABALIN 1000 MICROGRAM(S): 225 CAPSULE ORAL at 11:55

## 2022-05-10 RX ADMIN — ONDANSETRON 4 MILLIGRAM(S): 8 TABLET, FILM COATED ORAL at 18:24

## 2022-05-10 RX ADMIN — Medication 100 MILLIGRAM(S): at 05:57

## 2022-05-10 RX ADMIN — Medication 1 MILLIGRAM(S): at 18:25

## 2022-05-10 RX ADMIN — Medication 600 MILLIGRAM(S): at 18:09

## 2022-05-10 RX ADMIN — Medication 100 MILLIGRAM(S): at 13:31

## 2022-05-10 RX ADMIN — Medication 30 MILLILITER(S): at 18:23

## 2022-05-10 RX ADMIN — MORPHINE SULFATE 120 MILLIGRAM(S): 50 CAPSULE, EXTENDED RELEASE ORAL at 05:57

## 2022-05-10 RX ADMIN — FONDAPARINUX SODIUM 7.5 MILLIGRAM(S): 2.5 INJECTION, SOLUTION SUBCUTANEOUS at 11:56

## 2022-05-10 RX ADMIN — Medication 81 MILLIGRAM(S): at 11:55

## 2022-05-10 RX ADMIN — Medication 50 MILLIGRAM(S): at 08:22

## 2022-05-10 RX ADMIN — Medication 50 MILLIGRAM(S): at 18:10

## 2022-05-10 RX ADMIN — Medication 1 APPLICATION(S): at 18:10

## 2022-05-10 NOTE — PROGRESS NOTE ADULT - SUBJECTIVE AND OBJECTIVE BOX
Hospital Day:  5d    Subjective:    No acute events overnight.    Chart reviewed, patient seen and examined at bedside in AM. Patient appears comfortable while at rest in bed. No other complaints.     Denies headaches, changes in vision, chest pains, SOB, n/v/d, abd pain, constipation, changes in urination, pain on urination, weakness, fatigue, joint pain or muscle pain.       Past Medical Hx:   Lupus    AICD (automatic cardioverter/defibrillator) present    Throat cancer    RA (rheumatoid arthritis)    HTN (hypertension)    CHF (congestive heart failure)    COPD (chronic obstructive pulmonary disease)    DVT (deep venous thrombosis)    Pulmonary embolus    Pulmonary embolism    History of laryngeal cancer    GERD (gastroesophageal reflux disease)    Cervical cancer      Past Sx:  S/P appendectomy    S/P cholecystectomy    History of laryngeal cancer    AICD (automatic cardioverter/defibrillator) present    S/P hysterectomy    History of back surgery    H/O foot surgery    S/P eye surgery    Subclavian arterial stenosis      Allergies:  adhesives (Rash; Urticaria)  Arava (Anaphylaxis; Angioedema)  Avelox (Other)  Plaquenil (Other)  Vantin (Other (Mild))    Current Meds:   Standng Meds:  albuterol/ipratropium for Nebulization 3 milliLiter(s) Nebulizer every 6 hours  aspirin enteric coated 81 milliGRAM(s) Oral daily  budesonide 160 MICROgram(s)/formoterol 4.5 MICROgram(s) Inhaler 2 Puff(s) Inhalation two times a day  clindamycin IVPB 600 milliGRAM(s) IV Intermittent every 8 hours  cyanocobalamin 1000 MICROGram(s) Oral daily  folic acid 1 milliGRAM(s) Oral daily  fondaparinux Injectable 7.5 milliGRAM(s) SubCutaneous daily  hydrALAZINE 50 milliGRAM(s) Oral two times a day  metoprolol succinate  milliGRAM(s) Oral daily  morphine ER Tablet 120 milliGRAM(s) Oral <User Schedule>  silver sulfADIAZINE 1% Cream 1 Application(s) Topical two times a day    PRN Meds:  acetaminophen     Tablet .. 650 milliGRAM(s) Oral every 6 hours PRN Temp greater or equal to 38C (100.4F), Mild Pain (1 - 3)  ALPRAZolam 1 milliGRAM(s) Oral every 6 hours PRN anxiety  aluminum hydroxide/magnesium hydroxide/simethicone Suspension 30 milliLiter(s) Oral every 4 hours PRN Dyspepsia  melatonin 3 milliGRAM(s) Oral at bedtime PRN Insomnia  ondansetron Injectable 4 milliGRAM(s) IV Push every 8 hours PRN Nausea and/or Vomiting    HOME MEDICATIONS:  albuterol 90 mcg/inh inhalation aerosol: 2 puff(s) inhaled every 6 hours  ALPRAZolam 1 mg oral tablet: 2 tab(s) orally 3 times a day, As Needed  Benlysta: 200 milligram(s) intravenous once a week  cyanocobalamin 1000 mcg/mL injectable solution: 1 milliliter(s) injectable once a week  folic acid 1 mg oral tablet: 1 tab(s) orally once a day  fondaparinux 7.5 mg/0.6 mL subcutaneous solution: 0.6 milliliter(s) subcutaneous once a day  hydrALAZINE 50 mg oral tablet: 1 tab(s) orally 2 times a day  Metoprolol Succinate  mg oral tablet, extended release: 1 tab(s) orally once a day  MS Contin 60 mg oral tablet, extended release: 2 tab(s) orally 3 times a day (after meals)  Reglan 10 mg oral tablet: 1 tab(s) orally 4 times a day (before meals and at bedtime)  Trelegy Ellipta 100 mcg-62.5 mcg-25 mcg/inh inhalation powder: 1 puff(s) inhaled once a day      Vital Signs:   T(F): 97.2 (05-10-22 @ 05:10), Max: 97.5 (22 @ 20:56)  HR: 68 (05-10-22 @ 05:10) (68 - 70)  BP: 109/59 (05-10-22 @ 05:10) (109/59 - 137/70)  RR: 18 (05-10-22 @ 05:10) (16 - 18)  SpO2: 96% (22 @ 20:56) (96% - 96%)      22 @ 07:01  -  22 @ 07:00  --------------------------------------------------------  IN: 100 mL / OUT: 0 mL / NET: 100 mL    22 @ 07:01  -  05-10-22 @ 05:58  --------------------------------------------------------  IN: 120 mL / OUT: 0 mL / NET: 120 mL        Physical Exam:   CONSTITUTIONAL: Well-developed; well-nourished; in no acute distress, speaking in full sentences  HEAD: Normocephalic; atraumatic  EYES: PERRL, EOMI, no conjunctival erythema  NECK: Supple; non tender, FROM  CARD: +S1, S2 Regular rate and rhythm.  RESP: CTABL  ABD: soft nontender, nondistended, no rebound, no guarding, no rigidity  EXT: moves all extremities,  No clubbing, cyanosis or edema.   NEURO: Alert, oriented, grossly unremarkable, no focal deficits, cn ii-xii grossly intact  PSYCH: Cooperative, appropriate         Labs:                         14.7   7.77  )-----------( 196      ( 09 May 2022 06:48 )             44.8     Neutophil% 53.7, Lymphocyte% 30.9, Monocyte% 10.7, Bands% 0.4 22 @ 06:48    09 May 2022 06:48    137    |  98     |  17     ----------------------------<  99     4.7     |  27     |  0.9      Ca    9.9        09 May 2022 06:48  Phos  4.4       09 May 2022 06:48  Mg     1.6       09 May 2022 06:48    TPro  6.7    /  Alb  4.4    /  TBili  0.3    /  DBili  x      /  AST  21     /  ALT  27     /  AlkPhos  95     09 May 2022 06:48                    Urinalysis Basic - ( 08 May 2022 10:25 )    Color: Yellow / Appearance: Clear / S.028 / pH: x  Gluc: x / Ketone: Trace  / Bili: Negative / Urobili: <2 mg/dL   Blood: x / Protein: Trace / Nitrite: Negative   Leuk Esterase: Negative / RBC: 4 /HPF / WBC 2 /HPF   Sq Epi: x / Non Sq Epi: 13 /HPF / Bacteria: Negative          Culture - Blood (collected 22 @ 07:53)  Source: .Blood Blood  Preliminary Report (22 @ 15:06):    No growth to date.    Culture - Blood (collected 22 @ 17:13)  Source: .Blood Blood  Preliminary Report (22 @ 23:02):    No growth to date.    Culture - Blood (collected 22 @ 17:13)  Source: .Blood Blood  Preliminary Report (22 @ 23:02):    No growth to date.        =============    CAPILLARY BLOOD GLUCOSE            LIVER FUNCTIONS - ( 09 May 2022 06:48 )  Alb: 4.4 g/dL / Pro: 6.7 g/dL / ALK PHOS: 95 U/L / ALT: 27 U/L / AST: 21 U/L / GGT: x             Culture - Urine (collected 08 May 2022 10:25)  Source: Clean Catch Clean Catch (Midstream)  Final Report (09 May 2022 14:29):    No growth        Radiology:   IMAGING:             Hospital Day:  5d    Subjective:    No acute events overnight.    Chart reviewed, patient seen and examined at bedside in AM. Patient appears comfortable while at rest in bed. No other complaints.     Denies headaches, changes in vision, chest pains, SOB, n/v/d, abd pain, constipation, changes in urination, pain on urination, weakness, fatigue, joint pain or muscle pain.       Past Medical Hx:   Lupus    AICD (automatic cardioverter/defibrillator) present    Throat cancer    RA (rheumatoid arthritis)    HTN (hypertension)    CHF (congestive heart failure)    COPD (chronic obstructive pulmonary disease)    DVT (deep venous thrombosis)    Pulmonary embolus    Pulmonary embolism    History of laryngeal cancer    GERD (gastroesophageal reflux disease)    Cervical cancer      Past Sx:  S/P appendectomy    S/P cholecystectomy    History of laryngeal cancer    AICD (automatic cardioverter/defibrillator) present    S/P hysterectomy    History of back surgery    H/O foot surgery    S/P eye surgery    Subclavian arterial stenosis      Allergies:  adhesives (Rash; Urticaria)  Arava (Anaphylaxis; Angioedema)  Avelox (Other)  Plaquenil (Other)  Vantin (Other (Mild))    Current Meds:   Standng Meds:  albuterol/ipratropium for Nebulization 3 milliLiter(s) Nebulizer every 6 hours  aspirin enteric coated 81 milliGRAM(s) Oral daily  budesonide 160 MICROgram(s)/formoterol 4.5 MICROgram(s) Inhaler 2 Puff(s) Inhalation two times a day  clindamycin IVPB 600 milliGRAM(s) IV Intermittent every 8 hours  cyanocobalamin 1000 MICROGram(s) Oral daily  folic acid 1 milliGRAM(s) Oral daily  fondaparinux Injectable 7.5 milliGRAM(s) SubCutaneous daily  hydrALAZINE 50 milliGRAM(s) Oral two times a day  metoprolol succinate  milliGRAM(s) Oral daily  morphine ER Tablet 120 milliGRAM(s) Oral <User Schedule>  silver sulfADIAZINE 1% Cream 1 Application(s) Topical two times a day    PRN Meds:  acetaminophen     Tablet .. 650 milliGRAM(s) Oral every 6 hours PRN Temp greater or equal to 38C (100.4F), Mild Pain (1 - 3)  ALPRAZolam 1 milliGRAM(s) Oral every 6 hours PRN anxiety  aluminum hydroxide/magnesium hydroxide/simethicone Suspension 30 milliLiter(s) Oral every 4 hours PRN Dyspepsia  melatonin 3 milliGRAM(s) Oral at bedtime PRN Insomnia  ondansetron Injectable 4 milliGRAM(s) IV Push every 8 hours PRN Nausea and/or Vomiting    HOME MEDICATIONS:  albuterol 90 mcg/inh inhalation aerosol: 2 puff(s) inhaled every 6 hours  ALPRAZolam 1 mg oral tablet: 2 tab(s) orally 3 times a day, As Needed  Benlysta: 200 milligram(s) intravenous once a week  cyanocobalamin 1000 mcg/mL injectable solution: 1 milliliter(s) injectable once a week  folic acid 1 mg oral tablet: 1 tab(s) orally once a day  fondaparinux 7.5 mg/0.6 mL subcutaneous solution: 0.6 milliliter(s) subcutaneous once a day  hydrALAZINE 50 mg oral tablet: 1 tab(s) orally 2 times a day  Metoprolol Succinate  mg oral tablet, extended release: 1 tab(s) orally once a day  MS Contin 60 mg oral tablet, extended release: 2 tab(s) orally 3 times a day (after meals)  Reglan 10 mg oral tablet: 1 tab(s) orally 4 times a day (before meals and at bedtime)  Trelegy Ellipta 100 mcg-62.5 mcg-25 mcg/inh inhalation powder: 1 puff(s) inhaled once a day      Vital Signs:   T(F): 97.2 (05-10-22 @ 05:10), Max: 97.5 (22 @ 20:56)  HR: 68 (05-10-22 @ 05:10) (68 - 70)  BP: 109/59 (05-10-22 @ 05:10) (109/59 - 137/70)  RR: 18 (05-10-22 @ 05:10) (16 - 18)  SpO2: 96% (22 @ 20:56) (96% - 96%)      22 @ 07:01  -  22 @ 07:00  --------------------------------------------------------  IN: 100 mL / OUT: 0 mL / NET: 100 mL    22 @ 07:01  -  05-10-22 @ 05:58  --------------------------------------------------------  IN: 120 mL / OUT: 0 mL / NET: 120 mL        Physical Exam:   General: NAD, AAO3  HEENT:  EOMI, no LAD  CV: S1 S2  Resp: decreased breath sounds at bases  GI: NT/ND/S +BS  MS: Lt calf +echymoses, tenderness, decreased swelling and erythema  Neuro: nonfocal, +reflexes thruout          Labs:                         14.7   7.77  )-----------( 196      ( 09 May 2022 06:48 )             44.8     Neutophil% 53.7, Lymphocyte% 30.9, Monocyte% 10.7, Bands% 0.4 22 @ 06:48    09 May 2022 06:48    137    |  98     |  17     ----------------------------<  99     4.7     |  27     |  0.9      Ca    9.9        09 May 2022 06:48  Phos  4.4       09 May 2022 06:48  Mg     1.6       09 May 2022 06:48    TPro  6.7    /  Alb  4.4    /  TBili  0.3    /  DBili  x      /  AST  21     /  ALT  27     /  AlkPhos  95     09 May 2022 06:48                    Urinalysis Basic - ( 08 May 2022 10:25 )    Color: Yellow / Appearance: Clear / S.028 / pH: x  Gluc: x / Ketone: Trace  / Bili: Negative / Urobili: <2 mg/dL   Blood: x / Protein: Trace / Nitrite: Negative   Leuk Esterase: Negative / RBC: 4 /HPF / WBC 2 /HPF   Sq Epi: x / Non Sq Epi: 13 /HPF / Bacteria: Negative          Culture - Blood (collected 22 @ 07:53)  Source: .Blood Blood  Preliminary Report (22 @ 15:06):    No growth to date.    Culture - Blood (collected 22 @ 17:13)  Source: .Blood Blood  Preliminary Report (22 @ 23:02):    No growth to date.    Culture - Blood (collected 22 @ 17:13)  Source: .Blood Blood  Preliminary Report (22 @ 23:02):    No growth to date.        =============    CAPILLARY BLOOD GLUCOSE            LIVER FUNCTIONS - ( 09 May 2022 06:48 )  Alb: 4.4 g/dL / Pro: 6.7 g/dL / ALK PHOS: 95 U/L / ALT: 27 U/L / AST: 21 U/L / GGT: x             Culture - Urine (collected 08 May 2022 10:25)  Source: Clean Catch Clean Catch (Midstream)  Final Report (09 May 2022 14:29):    No growth        Radiology:   IMAGING:

## 2022-05-10 NOTE — PROGRESS NOTE ADULT - ASSESSMENT
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 pt has had this wound since last week with swelling of the calf, erythema, fever chills. She's been taking Augmentin for the last 6 days but has been worsening with black eschar.     IMPRESSION  #LLE lesions- overall lower suspicion for abscess, when opened- no purulence, only blood - rule out vasculitis    denies spider/tick bites    reports hx MRSA  < from: Xray Tibia + Fibula 2 Views, Left (05.05.22 @ 18:01) >No acute displaced fracture.  #Sepsis ruled out on admission   #SLE  lupus with lupus anticoagulant, antiphospholipid syndrome,  rheumatoid arthritis  #HF s/p AICD+PPM  #Abx allergy:  Vantin (Other (Mild))- unknown  avelox- heart issues    Creatinine, Serum: 1.0 (05-06-22 @ 07:53)    Weight (kg): 71.2 (05-05-22 @ 14:33)    RECOMMENDATIONS  - Clinda 600mg IV q8h for now , empiric 7 days  - Rheum consult  - f/u CT  - MRSA nares  - Warm compresses  - Appreciate Burn consult     If any questions, please call or send a message on [facility] Teams  Please continue to update ID with any pertinent new laboratory or radiographic findings  Spectra [id number]

## 2022-05-10 NOTE — CONSULT NOTE ADULT - ASSESSMENT
Patient is 50yo F with PMH of RA, HTN, CHF, COPD, DVT, PE, GERD, SLE  lupus w lupus anticoagulant, antiphospholipid syndrome,  rheumatoid arthritis, cervical cancer sp hysterectomy,  laryngeal cancer sp CT, RT, CHF s/p AICD+PPM, DVT/PE in 2015 on Fondaparinux, NSTEMI in 2015, subclavian stenosis s/p stent placement, HTN, HLD, PPM/AICD 4/2021.  Patient presented with a wound on the calf of the left lower extremity. Vascular surgery consulted as pt is known to Dr. Guan and was sent in by him for r/o cellulitis. Pt still with LLEx complaints.      PLAN:   - leg elevation   - C/w DVT PPX  - Plan to be discussed with Attending, Dr. [id number] Patient is 52yo F with PMH of RA, HTN, CHF, COPD, DVT, PE, GERD, SLE  lupus w lupus anticoagulant, antiphospholipid syndrome,  rheumatoid arthritis, cervical cancer sp hysterectomy,  laryngeal cancer sp CT, RT, CHF s/p AICD+PPM, DVT/PE in 2015 on Fondaparinux, NSTEMI in 2015, subclavian stenosis s/p stent placement, HTN, HLD, PPM/AICD 4/2021.  Patient presented with a wound on the calf of the left lower extremity. Vascular surgery consulted as pt is known to Dr. Guan and was sent in by him for r/o cellulitis. Pt still with LLEx complaints. Of note pt states she started Fondaparinux only 2 weeks ago.       PLAN:   - leg elevation   - c/w abx, follow ID recs  - agree with rheumatology consult  - C/w DVT PPX  - Plan to be discussed with Attending, Dr. Guan  - no acute vascular surgery intervention    SPECTRA [id number]

## 2022-05-10 NOTE — CONSULT NOTE ADULT - SUBJECTIVE AND OBJECTIVE BOX
VASCULAR SURGERY CONSULT NOTE    HPI:  51 year old woman with PMH of SLE  lupus with lupus anticoagulant, antiphospholipid syndrome,  rheumatoid arthritis, cervical cancer sp hysterectomy,  laryngeal cancer sp CT, RT, CHF s/p AICD+PPM, DVT/PE in 2015 on Fondaparinux, NSTEMI in 2015, subclavian stenosis s/p stent placement, HTN, HLD, PPM/AICD presented to the hospital for a wound on the calf of the left lower extremity. pt has had this wound since last week with swelling of the calf, erythema, fever chills. She's been taking Augmentin for the last 6 days but has been worsening with black eschar. pt went to Dr Guan who performed a duplex, was negative  in the ED,pt's VS T(C): 36.8 (05-05-22 @ 14:33), Max: 36.8 (05-05-22 @ 14:33)  HR: 87 (05-05-22 @ 15:58) (87 - 114)  BP: 138/67 (05-05-22 @ 15:58) (138/67 - 142/91)  RR: 18 (05-05-22 @ 14:33) (18 - 18)  SpO2: 96% (05-05-22 @ 14:33) (96% - 96%), labs done were unremarkable. Duplex done as outpatient was negative.   pt received aztreonam, metronidazole, Vancomycin.   pt is admitted for workup/management (05 May 2022 20:56)    Vascular surgery consulted for LLEx swelling. Patient was sent in by Dr. Guan.    PAST MEDICAL & SURGICAL HISTORY:  Lupus  RA (rheumatoid arthritis)  HTN (hypertension)  CHF (congestive heart failure)  COPD (chronic obstructive pulmonary disease)  DVT (deep venous thrombosis)  Pulmonary embolism  History of laryngeal cancer  GERD (gastroesophageal reflux disease)  Cervical cancer  S/P appendectomy  S/P cholecystectomy  AICD (automatic cardioverter/defibrillator) present  S/P hysterectomy  History of back surgery  H/O foot surgery  S/P eye surgery  Subclavian arterial stenosis    ALLERGIES:  adhesives (Rash; Urticaria)  Arava (Anaphylaxis; Angioedema)  Avelox (Other)  Plaquenil (Other)  Vantin (Other (Mild))    Home Medications:  albuterol 90 mcg/inh inhalation aerosol: 2 puff(s) inhaled every 6 hours (05 May 2022 21:12)  ALPRAZolam 1 mg oral tablet: 2 tab(s) orally 3 times a day, As Needed (05 May 2022 21:12)  Benlysta: 200 milligram(s) intravenous once a week (05 May 2022 21:12)  cyanocobalamin 1000 mcg/mL injectable solution: 1 milliliter(s) injectable once a week (05 May 2022 21:12)  folic acid 1 mg oral tablet: 1 tab(s) orally once a day (05 May 2022 21:12)  fondaparinux 7.5 mg/0.6 mL subcutaneous solution: 0.6 milliliter(s) subcutaneous once a day (05 May 2022 21:12)  hydrALAZINE 50 mg oral tablet: 1 tab(s) orally 2 times a day (05 May 2022 21:12)  Metoprolol Succinate  mg oral tablet, extended release: 1 tab(s) orally once a day (05 May 2022 21:12)  MS Contin 60 mg oral tablet, extended release: 2 tab(s) orally 3 times a day (after meals) (05 May 2022 21:12)  Reglan 10 mg oral tablet: 1 tab(s) orally 4 times a day (before meals and at bedtime) (05 May 2022 21:12)  Trelegy Ellipta 100 mcg-62.5 mcg-25 mcg/inh inhalation powder: 1 puff(s) inhaled once a day (05 May 2022 21:12)    12 point ROS otherwise normal except as stated in HPI    PHYSICAL EXAM  Vital Signs Last 24 Hrs  T(C): 35.7 (10 May 2022 14:30), Max: 36.4 (09 May 2022 20:56)  T(F): 96.2 (10 May 2022 14:30), Max: 97.5 (09 May 2022 20:56)  HR: 65 (10 May 2022 18:22) (61 - 83)  BP: 134/68 (10 May 2022 18:22) (109/59 - 156/74)  BP(mean): 94 (10 May 2022 18:22) (94 - 104)  RR: 18 (10 May 2022 14:30) (18 - 18)  SpO2: 95% (10 May 2022 18:22) (92% - 97%)    General Appearance: NAD  HEENT: Normocephalic, atraumatic, trachea midline  Heart: s1, s2,    Lungs: No increased work of breathing or accessory muscle use. Symmetric chest wall rise and fall. Bilateral breath sounds  Abdomen:  Soft, nontender, nondistended. No rebound or guarding.  MSK/Extremities: Warm & well-perfused.   Skin: Warm, dry. No jaundice.     MEDICATIONS:   MEDICATIONS  (STANDING):  albuterol/ipratropium for Nebulization 3 milliLiter(s) Nebulizer every 6 hours  aspirin enteric coated 81 milliGRAM(s) Oral daily  budesonide 160 MICROgram(s)/formoterol 4.5 MICROgram(s) Inhaler 2 Puff(s) Inhalation two times a day  clindamycin   Capsule      clindamycin   Capsule 600 milliGRAM(s) Oral three times a day  cyanocobalamin 1000 MICROGram(s) Oral daily  folic acid 1 milliGRAM(s) Oral daily  fondaparinux Injectable 7.5 milliGRAM(s) SubCutaneous daily  hydrALAZINE 50 milliGRAM(s) Oral two times a day  metoprolol succinate  milliGRAM(s) Oral daily  morphine ER Tablet 120 milliGRAM(s) Oral <User Schedule>  silver sulfADIAZINE 1% Cream 1 Application(s) Topical two times a day    MEDICATIONS  (PRN):  acetaminophen     Tablet .. 650 milliGRAM(s) Oral every 6 hours PRN Temp greater or equal to 38C (100.4F), Mild Pain (1 - 3)  ALPRAZolam 1 milliGRAM(s) Oral every 6 hours PRN anxiety  aluminum hydroxide/magnesium hydroxide/simethicone Suspension 30 milliLiter(s) Oral every 4 hours PRN Dyspepsia  melatonin 3 milliGRAM(s) Oral at bedtime PRN Insomnia  ondansetron    Tablet 4 milliGRAM(s) Oral every 8 hours PRN Nausea and/or Vomiting    LAB/STUDIES:                    14.6   6.09  )-----------( 183      ( 10 May 2022 08:23 )             42.8     05-10  137  |  99  |  15  ----------------------------<  94  4.5   |  21  |  0.9    Ca    9.6      10 May 2022 08:23  Phos  4.7     05-10  Mg     2.0     05-10    TPro  6.7  /  Alb  4.2  /  TBili  0.3  /  DBili  x   /  AST  21  /  ALT  20  /  AlkPhos  83  05-10    LIVER FUNCTIONS - ( 10 May 2022 08:23 )  Alb: 4.2 g/dL / Pro: 6.7 g/dL / ALK PHOS: 83 U/L / ALT: 20 U/L / AST: 21 U/L / GGT: x           Culture - Urine (collected 08 May 2022 10:25)  Source: Clean Catch Clean Catch (Midstream)  Final Report (09 May 2022 14:29):    No growth      IMAGING:  < from: [facility] Duplex Lower Extrem Arterial, Bilat (05.06.22 @ 12:30) >  Impression: Normal arterial flow in the bilateral lower extremities.  ICD-10:I70.213  --- End of Report ---  < end of copied text >    < from: Xray Tibia + Fibula 2 Views, Left (05.05.22 @ 18:01) >  IMPRESSION: No acute displaced fracture.  < end of copied text >     VASCULAR SURGERY CONSULT NOTE    HPI:  51 year old woman with PMH of SLE  lupus with lupus anticoagulant, antiphospholipid syndrome,  rheumatoid arthritis, cervical cancer sp hysterectomy,  laryngeal cancer sp CT, RT, CHF s/p AICD+PPM, DVT/PE in 2015 on Fondaparinux, NSTEMI in 2015, subclavian stenosis s/p stent placement, HTN, HLD, PPM/AICD presented to the hospital for a wound on the calf of the left lower extremity. pt has had this wound since last week with swelling of the calf, erythema, fever chills. She's been taking Augmentin for the last 6 days but has been worsening with black eschar. pt went to Dr Guan who performed a duplex, was negative  in the ED,pt's VS T(C): 36.8 (05-05-22 @ 14:33), Max: 36.8 (05-05-22 @ 14:33)  HR: 87 (05-05-22 @ 15:58) (87 - 114)  BP: 138/67 (05-05-22 @ 15:58) (138/67 - 142/91)  RR: 18 (05-05-22 @ 14:33) (18 - 18)  SpO2: 96% (05-05-22 @ 14:33) (96% - 96%), labs done were unremarkable. Duplex done as outpatient was negative.   pt received aztreonam, metronidazole, Vancomycin.   pt is admitted for workup/management (05 May 2022 20:56)    Vascular surgery consulted for LLEx wounds.    PAST MEDICAL & SURGICAL HISTORY:  Lupus  RA (rheumatoid arthritis)  HTN (hypertension)  CHF (congestive heart failure)  COPD (chronic obstructive pulmonary disease)  DVT (deep venous thrombosis)  Pulmonary embolism  History of laryngeal cancer  GERD (gastroesophageal reflux disease)  Cervical cancer  S/P appendectomy  S/P cholecystectomy  AICD (automatic cardioverter/defibrillator) present  S/P hysterectomy  History of back surgery  H/O foot surgery  S/P eye surgery  Subclavian arterial stenosis    ALLERGIES:  adhesives (Rash; Urticaria)  Arava (Anaphylaxis; Angioedema)  Avelox (Other)  Plaquenil (Other)  Vantin (Other (Mild))    Home Medications:  albuterol 90 mcg/inh inhalation aerosol: 2 puff(s) inhaled every 6 hours (05 May 2022 21:12)  ALPRAZolam 1 mg oral tablet: 2 tab(s) orally 3 times a day, As Needed (05 May 2022 21:12)  Benlysta: 200 milligram(s) intravenous once a week (05 May 2022 21:12)  cyanocobalamin 1000 mcg/mL injectable solution: 1 milliliter(s) injectable once a week (05 May 2022 21:12)  folic acid 1 mg oral tablet: 1 tab(s) orally once a day (05 May 2022 21:12)  fondaparinux 7.5 mg/0.6 mL subcutaneous solution: 0.6 milliliter(s) subcutaneous once a day (05 May 2022 21:12)  hydrALAZINE 50 mg oral tablet: 1 tab(s) orally 2 times a day (05 May 2022 21:12)  Metoprolol Succinate  mg oral tablet, extended release: 1 tab(s) orally once a day (05 May 2022 21:12)  MS Contin 60 mg oral tablet, extended release: 2 tab(s) orally 3 times a day (after meals) (05 May 2022 21:12)  Reglan 10 mg oral tablet: 1 tab(s) orally 4 times a day (before meals and at bedtime) (05 May 2022 21:12)  Trelegy Ellipta 100 mcg-62.5 mcg-25 mcg/inh inhalation powder: 1 puff(s) inhaled once a day (05 May 2022 21:12)    12 point ROS otherwise normal except as stated in HPI    PHYSICAL EXAM  Vital Signs Last 24 Hrs  T(C): 35.7 (10 May 2022 14:30), Max: 36.4 (09 May 2022 20:56)  T(F): 96.2 (10 May 2022 14:30), Max: 97.5 (09 May 2022 20:56)  HR: 65 (10 May 2022 18:22) (61 - 83)  BP: 134/68 (10 May 2022 18:22) (109/59 - 156/74)  BP(mean): 94 (10 May 2022 18:22) (94 - 104)  RR: 18 (10 May 2022 14:30) (18 - 18)  SpO2: 95% (10 May 2022 18:22) (92% - 97%)    General Appearance: NAD  HEENT: Normocephalic, atraumatic, trachea midline  Heart: s1, s2,    Lungs: No increased work of breathing or accessory muscle use. Symmetric chest wall rise and fall. Bilateral breath sounds  Abdomen:  Soft, nontender, nondistended. No rebound or guarding.  MSK/Extremities: Warm & well-perfused.  Palpable LLEx pulses. two areas of ecchymoses/induration that is tender to palpation. multiple small palpable nodules on LUE and rash appearing on RUE.  Skin: Warm, dry.    MEDICATIONS:   MEDICATIONS  (STANDING):  albuterol/ipratropium for Nebulization 3 milliLiter(s) Nebulizer every 6 hours  aspirin enteric coated 81 milliGRAM(s) Oral daily  budesonide 160 MICROgram(s)/formoterol 4.5 MICROgram(s) Inhaler 2 Puff(s) Inhalation two times a day  clindamycin   Capsule      clindamycin   Capsule 600 milliGRAM(s) Oral three times a day  cyanocobalamin 1000 MICROGram(s) Oral daily  folic acid 1 milliGRAM(s) Oral daily  fondaparinux Injectable 7.5 milliGRAM(s) SubCutaneous daily  hydrALAZINE 50 milliGRAM(s) Oral two times a day  metoprolol succinate  milliGRAM(s) Oral daily  morphine ER Tablet 120 milliGRAM(s) Oral <User Schedule>  silver sulfADIAZINE 1% Cream 1 Application(s) Topical two times a day    MEDICATIONS  (PRN):  acetaminophen     Tablet .. 650 milliGRAM(s) Oral every 6 hours PRN Temp greater or equal to 38C (100.4F), Mild Pain (1 - 3)  ALPRAZolam 1 milliGRAM(s) Oral every 6 hours PRN anxiety  aluminum hydroxide/magnesium hydroxide/simethicone Suspension 30 milliLiter(s) Oral every 4 hours PRN Dyspepsia  melatonin 3 milliGRAM(s) Oral at bedtime PRN Insomnia  ondansetron    Tablet 4 milliGRAM(s) Oral every 8 hours PRN Nausea and/or Vomiting    LAB/STUDIES:                    14.6   6.09  )-----------( 183      ( 10 May 2022 08:23 )             42.8     05-10  137  |  99  |  15  ----------------------------<  94  4.5   |  21  |  0.9    Ca    9.6      10 May 2022 08:23  Phos  4.7     05-10  Mg     2.0     05-10    TPro  6.7  /  Alb  4.2  /  TBili  0.3  /  DBili  x   /  AST  21  /  ALT  20  /  AlkPhos  83  05-10    LIVER FUNCTIONS - ( 10 May 2022 08:23 )  Alb: 4.2 g/dL / Pro: 6.7 g/dL / ALK PHOS: 83 U/L / ALT: 20 U/L / AST: 21 U/L / GGT: x           Culture - Urine (collected 08 May 2022 10:25)  Source: Clean Catch Clean Catch (Midstream)  Final Report (09 May 2022 14:29):    No growth      IMAGING:  < from: [facility] Duplex Lower Extrem Arterial, Bilat (05.06.22 @ 12:30) >  Impression: Normal arterial flow in the bilateral lower extremities.  ICD-10:I70.213  --- End of Report ---  < end of copied text >    < from: Xray Tibia + Fibula 2 Views, Left (05.05.22 @ 18:01) >  IMPRESSION: No acute displaced fracture.  < end of copied text >

## 2022-05-10 NOTE — PROGRESS NOTE ADULT - ASSESSMENT
51 year old woman with PMH of SLE  lupus with lupus anticoagulant, antiphospholipid syndrome,  rheumatoid arthritis, cervical cancer sp hysterectomy,  laryngeal cancer sp CT, RT, CHF s/p AICD+PPM, DVT/PE in 2015 on Fondaparinux, NSTEMI in 2015, subclavian stenosis s/p stent placement, HTN, HLD, PPM/AICD presented to the hospital for a wound on the calf of the left lower extremity. pt has had this wound since last week with swelling of the calf, erythema, fever chills. She's been taking Augmentin for the last 6 days but has been worsening with black eschar. pt went to Dr Guan who performed a duplex, was negative    A/P:   #Left lower lesion ?cellulitis with small abscess vs hematoma  -Blood cx no growth  -Arterial Duplex was normal.   -No fever, Continue Clindamycin IV 600mg q 8hrs. If collection is available will send wound cx.   -Burn evaluated   - ID (Dr. Novak) states possible vasculitis; consulted rheum per ID recs  - rheum c/s appreciated  - f/u rheum labs (e.g. C3, C4, etc.)    - f/u CTA LE with Iv contrast. D/w radiologist: no collection, abscess or hematoma. Official read pending  -will repeat LE u/s and get vascular surgery opinion on possible etiology of pain/induration     #History of VTE/ PE on fondaparinux  - continue Fondaparinux for now.   -reports resolution of LUE edema    #History of long-QT syndrome and VT sp AICD  #HFpEF  - EF 63% 2019  - s/p AICD placement (s/p laser lead extraction of ICD leads and device and reimplanted with new RA/RV lead and new ICD device ([employer]) in 2021)    #SLE/RA  #APLAS  - not in flare  - on Fondaparinux  -rheum f/u    #COPD  - inhalers and nebs for COPD   -counselled pt to avoid smoking and secondary exposure    #Chronic pain syndrome  -I-Stop checked and meds ordered    #anxiety  -Xanax PRN    #MISC  DVT ppx: Fondaparinux  GI PPX not needed  DIET dash  Code status: full code    #Progress Note Handoff  Pending: Consults____Clinical improvement and stability__x__CT Lt calf, Vascular, Doppler. Pt anticipated for d/c tomorrow if workup negative. Pt aware.  Pt/Family discussion: Pt/ informed and agree with the current plan  Disposition: Home__    My note supersedes the residents note should a discrepancy arise.    Chart and notes personally reviewed.  Care Discussed with Consultants/Other Providers/ Housestaff [ x] YES [ ] NO   Radiology, labs, old records personally reviewed.    discussed w/ housestaff, nursing, case management     Risk Statement (NON-critical care).     On this date of service, level of risk to patient is considered: High.     Due to: infection in immunocompromised pt.     Time-based billing (NON-critical care).     35 minutes spent on total encounter; more than 50% of the visit was spent counseling and / or coordinating care by the attending physician.  The necessity of the time spent during the encounter on this date of service was due to:     time spent on review of labs, imaging studies, old records, obtaining history, personally examining patient, counselling and communicating with patient/ family, entering orders for medications/tests/etc, discussions with other health care providers, documentation in electronic health records, independent interpretation of labs, imaging/procedure results and care coordination.

## 2022-05-10 NOTE — PROGRESS NOTE ADULT - SUBJECTIVE AND OBJECTIVE BOX
ILEANA MCNEIL  51y, Female  Allergy: adhesives (Rash; Urticaria)  Arava (Anaphylaxis; Angioedema)  Avelox (Other)  Plaquenil (Other)  Vantin (Other (Mild))      LOS  5d    CHIEF COMPLAINT: left lower extremity wound (10 May 2022 05:58)      INTERVAL EVENTS/HPI  - No acute events overnight  - T(F): , Max: 97.5 (05-09-22 @ 20:56)  - Denies any worsening symptoms  - Tolerating medication  - WBC Count: 6.09 (05-10-22 @ 08:23)  WBC Count: 7.77 (05-09-22 @ 06:48)     - Creatinine, Serum: 0.9 (05-10-22 @ 08:23)  Creatinine, Serum: 0.9 (05-09-22 @ 06:48)       ROS  General: Denies rigors, nightsweats  HEENT: Denies headache, rhinorrhea, sore throat, eye pain  CV: Denies CP, palpitations  PULM: Denies wheezing, hemoptysis  GI: Denies hematemesis, hematochezia, melena  : Denies discharge, hematuria  MSK: Denies arthralgias, myalgias  SKIN: Denies rash, lesions  NEURO: Denies paresthesias, weakness  PSYCH: Denies depression, anxiety    VITALS:  T(F): 97.2, Max: 97.5 (05-09-22 @ 20:56)  HR: 83  BP: 156/74  RR: 18Vital Signs Last 24 Hrs  T(C): 36.2 (10 May 2022 05:10), Max: 36.4 (09 May 2022 20:56)  T(F): 97.2 (10 May 2022 05:10), Max: 97.5 (09 May 2022 20:56)  HR: 83 (10 May 2022 08:23) (68 - 83)  BP: 156/74 (10 May 2022 08:23) (109/59 - 156/74)  BP(mean): 104 (10 May 2022 08:23) (104 - 104)  RR: 18 (10 May 2022 05:10) (16 - 18)  SpO2: 92% (10 May 2022 08:23) (92% - 96%)    PHYSICAL EXAM:  Gen: NAD, resting in bed  HEENT: Normocephalic, atraumatic  Neck: supple, no lymphadenopathy  CV: Regular rate & regular rhythm  Lungs: decreased BS at bases, no fremitus  Abdomen: Soft, BS present  Ext: Warm, well perfused, lesion improving, posterior calf induration/ ecchymoses  Neuro: non focal, awake  Skin: no rash, no erythema  Lines: no phlebitis    FH: Non-contributory  Social Hx: Non-contributory    TESTS & MEASUREMENTS:                        14.6   6.09  )-----------( 183      ( 10 May 2022 08:23 )             42.8     05-10    137  |  99  |  15  ----------------------------<  94  4.5   |  21  |  0.9    Ca    9.6      10 May 2022 08:23  Phos  4.7     05-10  Mg     2.0     05-10    TPro  6.7  /  Alb  4.2  /  TBili  0.3  /  DBili  x   /  AST  21  /  ALT  20  /  AlkPhos  83  05-10      LIVER FUNCTIONS - ( 10 May 2022 08:23 )  Alb: 4.2 g/dL / Pro: 6.7 g/dL / ALK PHOS: 83 U/L / ALT: 20 U/L / AST: 21 U/L / GGT: x               Culture - Urine (collected 05-08-22 @ 10:25)  Source: Clean Catch Clean Catch (Midstream)  Final Report (05-09-22 @ 14:29):    No growth    Culture - Blood (collected 05-06-22 @ 07:53)  Source: .Blood Blood  Preliminary Report (05-07-22 @ 15:06):    No growth to date.    Culture - Blood (collected 05-05-22 @ 17:13)  Source: .Blood Blood  Preliminary Report (05-06-22 @ 23:02):    No growth to date.    Culture - Blood (collected 05-05-22 @ 17:13)  Source: .Blood Blood  Preliminary Report (05-06-22 @ 23:02):    No growth to date.        Lactate, Blood: 0.6 mmol/L (05-05-22 @ 17:08)      INFECTIOUS DISEASES TESTING  COVID-19 PCR: NotDetec (05-05-22 @ 18:42)  COVID-19 PCR: NotDetec (03-23-22 @ 20:23)      INFLAMMATORY MARKERS  Sedimentation Rate, Erythrocyte: 19 mm/Hr (05-10-22 @ 08:23)  Sedimentation Rate, Erythrocyte: 11 mm/Hr (05-06-22 @ 07:53)  C-Reactive Protein, Serum: 14 mg/L (05-06-22 @ 07:53)  Sedimentation Rate, Erythrocyte: 12 mm/Hr (05-05-22 @ 17:08)      RADIOLOGY & ADDITIONAL TESTS:  I have personally reviewed the last available Chest xray  CXR      CT      CARDIOLOGY TESTING      MEDICATIONS  albuterol/ipratropium for Nebulization 3 Nebulizer every 6 hours  aspirin enteric coated 81 Oral daily  budesonide 160 MICROgram(s)/formoterol 4.5 MICROgram(s) Inhaler 2 Inhalation two times a day  clindamycin IVPB 600 IV Intermittent every 8 hours  cyanocobalamin 1000 Oral daily  folic acid 1 Oral daily  fondaparinux Injectable 7.5 SubCutaneous daily  hydrALAZINE 50 Oral two times a day  metoprolol succinate  Oral daily  morphine ER Tablet 120 Oral <User Schedule>  silver sulfADIAZINE 1% Cream 1 Topical two times a day      WEIGHT  Weight (kg): 71.2 (05-05-22 @ 14:33)  Creatinine, Serum: 0.9 mg/dL (05-10-22 @ 08:23)      ANTIBIOTICS:  clindamycin IVPB 600 milliGRAM(s) IV Intermittent every 8 hours      All available historical records have been reviewed

## 2022-05-10 NOTE — PROGRESS NOTE ADULT - ASSESSMENT
51 year old woman with PMH of SLE  lupus with lupus anticoagulant, antiphospholipid syndrome,  rheumatoid arthritis, cervical cancer sp hysterectomy,  laryngeal cancer sp CT, RT, CHF s/p AICD+PPM, DVT/PE in 2015 on Fondaparinux, NSTEMI in 2015, subclavian stenosis s/p stent placement, HTN, HLD, PPM/AICD presented to the hospital for a wound on the calf of the left lower extremity. pt has had this wound since last week with swelling of the calf, erythema, fever chills. She's been taking Augmentin for the last 6 days but has been worsening with black eschar. pt went to Dr Guan who performed a duplex, was negative    A/P:   #Left lower lesion ?cellulitis with small abscess   -Blood cx no growth  -Arterial Duplex was normal.   -No fever, Continue Clindamycin IV 600mg q 8hrs. If collection is available will send wound cx.   -Burn evaluated   - ID (Dr. Novak) states possible vasculitis; consulted rheum per ID recs  - consulted rheum to r/o vasculitis as per ID (5/9)  - f/u rheum labs (e.g. C3, C4, etc.) (collecting 5/9)   - f/u CTA LE with Iv contrast     #History of VTE/ PE on fondaparinux  - continue Fondaparinux for now.   -reports resolution of LUE edema    #History of long-QT syndrome and VT sp AICD  #HFpEF  - EF 63% 2019  - EP on board  - s/p AICD placement (s/p laser lead extraction of ICD leads and device and reimplanted with new RA/RV lead and new ICD device ([employer]tr[employer]) in 2021)    #SLE/RA  #APLAS  - not in flare  - on Fondaparinux  -rheum f/u    #COPD  - inhalers and nebs for COPD   -counselled pt to avoid smoking and secondary exposure    #Chronic pain syndrome  -I-Stop checked and meds ordered    #anxiety  -Xanax PRN    #MISC  DVT ppx: Fondaparinux  GI PPX not needed  DIET dash  Code status: full code    #Progress Note Handoff  Pending: Consults____Clinical improvement and stability__x__CT Lt calf_   51 year old woman with PMH of SLE  lupus with lupus anticoagulant, antiphospholipid syndrome,  rheumatoid arthritis, cervical cancer sp hysterectomy,  laryngeal cancer sp CT, RT, CHF s/p AICD+PPM, DVT/PE in 2015 on Fondaparinux, NSTEMI in 2015, subclavian stenosis s/p stent placement, HTN, HLD, PPM/AICD presented to the hospital for a wound on the calf of the left lower extremity. pt has had this wound since last week with swelling of the calf, erythema, fever chills. She's been taking Augmentin for the last 6 days but has been worsening with black eschar. pt went to Dr Guan who performed a duplex, was negative    A/P:   #Left lower lesion ?cellulitis with small abscess   -Blood cx no growth  -Arterial Duplex was normal.   -No fever, Continue Clindamycin IV 600mg q 8hrs. If collection is available will send wound cx.   -Burn evaluated   - ID (Dr. Novak) states possible vasculitis; consulted rheum per ID recs  - consulted rheum to r/o vasculitis as per ID (5/9)  - f/u rheum labs (e.g. C3, C4, etc.) (collecting 5/9)   - f/u CTA LE with Iv contrast     #History of VTE/ PE on fondaparinux  - continue Fondaparinux for now.   -reports resolution of LUE edema    #History of long-QT syndrome and VT sp AICD  #HFpEF  - EF 63% 2019  - EP on board  - s/p AICD placement (s/p laser lead extraction of ICD leads and device and reimplanted with new RA/RV lead and new ICD device ([employer]tr[employer]) in 2021)    #SLE/RA  #APLAS  - not in flare  - on Fondaparinux  -rheum f/u    #COPD  - inhalers and nebs for COPD   -counselled pt to avoid smoking and secondary exposure  -SpO2 90-91% on RA (5/10)    #Chronic pain syndrome  -I-Stop checked and meds ordered    #anxiety  -Xanax PRN    #MISC  DVT ppx: Fondaparinux  GI PPX not needed  DIET dash  Code status: full code    #Progress Note Handoff  Pending: Consults____Clinical improvement and stability__x__CT Lt calf_   51 year old woman with PMH of SLE  lupus with lupus anticoagulant, antiphospholipid syndrome,  rheumatoid arthritis, cervical cancer sp hysterectomy,  laryngeal cancer sp CT, RT, CHF s/p AICD+PPM, DVT/PE in 2015 on Fondaparinux, NSTEMI in 2015, subclavian stenosis s/p stent placement, HTN, HLD, PPM/AICD presented to the hospital for a wound on the calf of the left lower extremity. pt has had this wound since last week with swelling of the calf, erythema, fever chills. She's been taking Augmentin for the last 6 days but has been worsening with black eschar. pt went to Dr Guan who performed a duplex, was negative    A/P:   #Left lower lesion ?cellulitis with small abscess   -Blood cx no growth  -Arterial Duplex was normal.   -No fever, Continue Clindamycin IV 600mg q 8hrs. If collection is available will send wound cx.   -Burn evaluated   - ID (Dr. Novak) states possible vasculitis; consulted rheum per ID recs  - consulted rheum to r/o vasculitis as per ID (5/9)  - f/u rheum labs (e.g. C3, C4, etc.) (collecting 5/9)   - f/u CTA LE with Iv contrast     #History of VTE/ PE on fondaparinux  - continue Fondaparinux for now.   -reports resolution of LUE edema    #History of long-QT syndrome and VT sp AICD  #HFpEF  - EF 63% 2019  - EP on board  - s/p AICD placement (s/p laser lead extraction of ICD leads and device and reimplanted with new RA/RV lead and new ICD device ([employer]) in 2021)    #SLE/RA  #APLAS  - not in flare  - on Fondaparinux  -rheum f/u    #COPD  - inhalers and nebs for COPD   -counselled pt to avoid smoking and secondary exposure  -SpO2 90-91% on RA (5/10)    #Chronic pain syndrome  -I-Stop checked and meds ordered    #anxiety  -Xanax PRN    #MISC  DVT ppx: Fondaparinux  GI PPX not needed  DIET dash  Code status: full code    #other  - pending imaging of tib-fib  - pending MRSA swab 51 year old woman with PMH of SLE  lupus with lupus anticoagulant, antiphospholipid syndrome,  rheumatoid arthritis, cervical cancer sp hysterectomy,  laryngeal cancer sp CT, RT, CHF s/p AICD+PPM, DVT/PE in 2015 on Fondaparinux, NSTEMI in 2015, subclavian stenosis s/p stent placement, HTN, HLD, PPM/AICD presented to the hospital for a wound on the calf of the left lower extremity. pt has had this wound since last week with swelling of the calf, erythema, fever chills. She's been taking Augmentin for the last 6 days but has been worsening with black eschar. pt went to Dr Guan who performed a duplex, was negative    A/P:   #Left lower lesion ?cellulitis with small abscess   -Blood cx no growth  -Arterial Duplex was normal.   -No fever, Continue Clindamycin IV 600mg q 8hrs. If collection is available will send wound cx.   -Burn evaluated   - ID (Dr. Novak) states possible vasculitis; consulted rheum per ID recs  - consulted rheum to r/o vasculitis as per ID (5/9)  - f/u rheum labs (e.g. C3, C4, etc.) (collecting 5/9)   - f/u CTA LE with Iv contrast     #History of VTE/ PE on fondaparinux  - continue Fondaparinux for now.   -reports resolution of LUE edema    #History of long-QT syndrome and VT sp AICD  #HFpEF  - EF 63% 2019  - EP on board  - s/p AICD placement (s/p laser lead extraction of ICD leads and device and reimplanted with new RA/RV lead and new ICD device ([employer]) in 2021)    #SLE/RA  #APLAS  - not in flare  - on Fondaparinux  -rheum f/u    #COPD  - inhalers and nebs for COPD   -counselled pt to avoid smoking and secondary exposure  -SpO2 90-91% on RA (5/10)    #Chronic pain syndrome  -I-Stop checked and meds ordered    #anxiety  -Xanax PRN    #MISC  DVT ppx: Fondaparinux  GI PPX not needed  DIET dash  Code status: full code    #other  - pending imaging of tib-fib  - pending MRSA swab  - lost IV access on 5/10; attempted - unable to place w/ US; switched to po clindamycin & ondansetron po

## 2022-05-10 NOTE — PROGRESS NOTE ADULT - SUBJECTIVE AND OBJECTIVE BOX
Patient is a 51y old  Female who presents with a chief complaint of left lower extremity wound (09 May 2022 13:37)    INTERVAL HPI/OVERNIGHT EVENTS: Patient was examined and seen at bedside. This morning pt is resting comfortably in bed and reports persistent Lt calf pain. Overall better but pain persists. Not in the room on multiple occasions and walking outside the hospital.  ROS: Denies CP, SOB, AP, new weakness  All other systems reviewed and are within normal limits.  InitialHPI:  51 year old woman with PMH of SLE  lupus with lupus anticoagulant, antiphospholipid syndrome,  rheumatoid arthritis, cervical cancer sp hysterectomy,  laryngeal cancer sp CT, RT, CHF s/p AICD+PPM, DVT/PE in 2015 on Fondaparinux, NSTEMI in 2015, subclavian stenosis s/p stent placement, HTN, HLD, PPM/AICD presented to the hospital for a wound on the calf of the left lower extremity. pt has had this wound since last week with swelling of the calf, erythema, fever chills. She's been taking Augmentin for the last 6 days but has been worsening with black eschar. pt went to Dr Guan who performed a duplex, was negative  in the ED,pt's VS T(C): 36.8 (05-05-22 @ 14:33), Max: 36.8 (05-05-22 @ 14:33)  HR: 87 (05-05-22 @ 15:58) (87 - 114)  BP: 138/67 (05-05-22 @ 15:58) (138/67 - 142/91)  RR: 18 (05-05-22 @ 14:33) (18 - 18)  SpO2: 96% (05-05-22 @ 14:33) (96% - 96%), labs done were unremarkable. Duplex done as outpatient was negative.   pt received aztreonam, metronidazole, Vancomycin.   pt is admitted for workup/management (05 May 2022 20:56)    PAST MEDICAL & SURGICAL HISTORY:  Lupus    RA (rheumatoid arthritis)    HTN (hypertension)    CHF (congestive heart failure)    COPD (chronic obstructive pulmonary disease)    DVT (deep venous thrombosis)    Pulmonary embolism    History of laryngeal cancer    GERD (gastroesophageal reflux disease)    Cervical cancer    S/P appendectomy    S/P cholecystectomy    AICD (automatic cardioverter/defibrillator) present    S/P hysterectomy    History of back surgery    H/O foot surgery    S/P eye surgery    Subclavian arterial stenosis        General: NAD, AAO3  HEENT:  EOMI, no LAD  CV: S1 S2  Resp: decreased breath sounds at bases  GI: NT/ND/S +BS  MS: Lt calf decreased swelling and erythema; initial pustule resolved; post calf +echymoses, area of induration  Neuro: nonfocal, +reflexes thruout            MEDICATIONS  (STANDING):  albuterol/ipratropium for Nebulization 3 milliLiter(s) Nebulizer every 6 hours  aspirin enteric coated 81 milliGRAM(s) Oral daily  budesonide 160 MICROgram(s)/formoterol 4.5 MICROgram(s) Inhaler 2 Puff(s) Inhalation two times a day  clindamycin   Capsule      clindamycin   Capsule 600 milliGRAM(s) Oral once  clindamycin   Capsule 600 milliGRAM(s) Oral three times a day  cyanocobalamin 1000 MICROGram(s) Oral daily  folic acid 1 milliGRAM(s) Oral daily  fondaparinux Injectable 7.5 milliGRAM(s) SubCutaneous daily  hydrALAZINE 50 milliGRAM(s) Oral two times a day  metoprolol succinate  milliGRAM(s) Oral daily  morphine ER Tablet 120 milliGRAM(s) Oral <User Schedule>  silver sulfADIAZINE 1% Cream 1 Application(s) Topical two times a day    MEDICATIONS  (PRN):  acetaminophen     Tablet .. 650 milliGRAM(s) Oral every 6 hours PRN Temp greater or equal to 38C (100.4F), Mild Pain (1 - 3)  ALPRAZolam 1 milliGRAM(s) Oral every 6 hours PRN anxiety  aluminum hydroxide/magnesium hydroxide/simethicone Suspension 30 milliLiter(s) Oral every 4 hours PRN Dyspepsia  melatonin 3 milliGRAM(s) Oral at bedtime PRN Insomnia  ondansetron    Tablet 4 milliGRAM(s) Oral every 8 hours PRN Nausea and/or Vomiting    Home Medications:  albuterol 90 mcg/inh inhalation aerosol: 2 puff(s) inhaled every 6 hours (05 May 2022 21:12)  ALPRAZolam 1 mg oral tablet: 2 tab(s) orally 3 times a day, As Needed (05 May 2022 21:12)  Benlysta: 200 milligram(s) intravenous once a week (05 May 2022 21:12)  cyanocobalamin 1000 mcg/mL injectable solution: 1 milliliter(s) injectable once a week (05 May 2022 21:12)  folic acid 1 mg oral tablet: 1 tab(s) orally once a day (05 May 2022 21:12)  fondaparinux 7.5 mg/0.6 mL subcutaneous solution: 0.6 milliliter(s) subcutaneous once a day (05 May 2022 21:12)  hydrALAZINE 50 mg oral tablet: 1 tab(s) orally 2 times a day (05 May 2022 21:12)  Metoprolol Succinate  mg oral tablet, extended release: 1 tab(s) orally once a day (05 May 2022 21:12)  MS Contin 60 mg oral tablet, extended release: 2 tab(s) orally 3 times a day (after meals) (05 May 2022 21:12)  Reglan 10 mg oral tablet: 1 tab(s) orally 4 times a day (before meals and at bedtime) (05 May 2022 21:12)  Trelegy Ellipta 100 mcg-62.5 mcg-25 mcg/inh inhalation powder: 1 puff(s) inhaled once a day (05 May 2022 21:12)    Vital Signs Last 24 Hrs  T(C): 35.7 (10 May 2022 14:30), Max: 36.4 (09 May 2022 20:56)  T(F): 96.2 (10 May 2022 14:30), Max: 97.5 (09 May 2022 20:56)  HR: 61 (10 May 2022 14:30) (61 - 83)  BP: 135/63 (10 May 2022 14:30) (109/59 - 156/74)  BP(mean): 104 (10 May 2022 08:23) (104 - 104)  RR: 18 (10 May 2022 14:30) (18 - 18)  SpO2: 97% (10 May 2022 14:30) (92% - 97%)  CAPILLARY BLOOD GLUCOSE        LABS:                        14.6   6.09  )-----------( 183      ( 10 May 2022 08:23 )             42.8     05-10    137  |  99  |  15  ----------------------------<  94  4.5   |  21  |  0.9    Ca    9.6      10 May 2022 08:23  Phos  4.7     05-10  Mg     2.0     05-10    TPro  6.7  /  Alb  4.2  /  TBili  0.3  /  DBili  x   /  AST  21  /  ALT  20  /  AlkPhos  83  05-10    LIVER FUNCTIONS - ( 10 May 2022 08:23 )  Alb: 4.2 g/dL / Pro: 6.7 g/dL / ALK PHOS: 83 U/L / ALT: 20 U/L / AST: 21 U/L / GGT: x                           Culture - Urine (collected 08 May 2022 10:25)  Source: Clean Catch Clean Catch (Midstream)  Final Report (09 May 2022 14:29):    No growth      Consultant Notes Reviewed:  [x ] YES  [ ] NO  Care Discussed with Consultants/Other Providers/ Housestaff [ x] YES  [ ] NO  Radiology, labs, new studies personally reviewed.

## 2022-05-11 LAB
ALBUMIN SERPL ELPH-MCNC: 4.6 G/DL — SIGNIFICANT CHANGE UP (ref 3.5–5.2)
ALBUMIN SERPL ELPH-MCNC: 4.7 G/DL — SIGNIFICANT CHANGE UP (ref 3.5–5.2)
ALP SERPL-CCNC: 88 U/L — SIGNIFICANT CHANGE UP (ref 30–115)
ALP SERPL-CCNC: 88 U/L — SIGNIFICANT CHANGE UP (ref 30–115)
ALT FLD-CCNC: 16 U/L — SIGNIFICANT CHANGE UP (ref 0–41)
ALT FLD-CCNC: 18 U/L — SIGNIFICANT CHANGE UP (ref 0–41)
ANION GAP SERPL CALC-SCNC: 15 MMOL/L — HIGH (ref 7–14)
ANION GAP SERPL CALC-SCNC: 16 MMOL/L — HIGH (ref 7–14)
AST SERPL-CCNC: 16 U/L — SIGNIFICANT CHANGE UP (ref 0–41)
AST SERPL-CCNC: 22 U/L — SIGNIFICANT CHANGE UP (ref 0–41)
BASOPHILS # BLD AUTO: 0.07 K/UL — SIGNIFICANT CHANGE UP (ref 0–0.2)
BASOPHILS NFR BLD AUTO: 0.7 % — SIGNIFICANT CHANGE UP (ref 0–1)
BILIRUB SERPL-MCNC: 0.4 MG/DL — SIGNIFICANT CHANGE UP (ref 0.2–1.2)
BILIRUB SERPL-MCNC: 0.5 MG/DL — SIGNIFICANT CHANGE UP (ref 0.2–1.2)
BUN SERPL-MCNC: 14 MG/DL — SIGNIFICANT CHANGE UP (ref 10–20)
BUN SERPL-MCNC: 15 MG/DL — SIGNIFICANT CHANGE UP (ref 10–20)
C3 SERPL-MCNC: 169 MG/DL — HIGH (ref 81–157)
C4 SERPL-MCNC: 35 MG/DL — SIGNIFICANT CHANGE UP (ref 13–39)
CALCIUM SERPL-MCNC: 10.4 MG/DL — HIGH (ref 8.5–10.1)
CALCIUM SERPL-MCNC: 9.8 MG/DL — SIGNIFICANT CHANGE UP (ref 8.5–10.1)
CHLORIDE SERPL-SCNC: 94 MMOL/L — LOW (ref 98–110)
CHLORIDE SERPL-SCNC: 96 MMOL/L — LOW (ref 98–110)
CO2 SERPL-SCNC: 22 MMOL/L — SIGNIFICANT CHANGE UP (ref 17–32)
CO2 SERPL-SCNC: 26 MMOL/L — SIGNIFICANT CHANGE UP (ref 17–32)
CREAT SERPL-MCNC: 1 MG/DL — SIGNIFICANT CHANGE UP (ref 0.7–1.5)
CREAT SERPL-MCNC: 1.1 MG/DL — SIGNIFICANT CHANGE UP (ref 0.7–1.5)
CULTURE RESULTS: SIGNIFICANT CHANGE UP
DSDNA AB SER-ACNC: <12 IU/ML — SIGNIFICANT CHANGE UP
EGFR: 61 ML/MIN/1.73M2 — SIGNIFICANT CHANGE UP
EGFR: 68 ML/MIN/1.73M2 — SIGNIFICANT CHANGE UP
EOSINOPHIL # BLD AUTO: 0.38 K/UL — SIGNIFICANT CHANGE UP (ref 0–0.7)
EOSINOPHIL NFR BLD AUTO: 3.8 % — SIGNIFICANT CHANGE UP (ref 0–8)
GLUCOSE SERPL-MCNC: 129 MG/DL — HIGH (ref 70–99)
GLUCOSE SERPL-MCNC: 237 MG/DL — HIGH (ref 70–99)
HCT VFR BLD CALC: 45.9 % — SIGNIFICANT CHANGE UP (ref 37–47)
HGB BLD-MCNC: 15.5 G/DL — SIGNIFICANT CHANGE UP (ref 12–16)
IMM GRANULOCYTES NFR BLD AUTO: 0.3 % — SIGNIFICANT CHANGE UP (ref 0.1–0.3)
LYMPHOCYTES # BLD AUTO: 3.98 K/UL — HIGH (ref 1.2–3.4)
LYMPHOCYTES # BLD AUTO: 40 % — SIGNIFICANT CHANGE UP (ref 20.5–51.1)
MAGNESIUM SERPL-MCNC: 2 MG/DL — SIGNIFICANT CHANGE UP (ref 1.8–2.4)
MCHC RBC-ENTMCNC: 29.2 PG — SIGNIFICANT CHANGE UP (ref 27–31)
MCHC RBC-ENTMCNC: 33.8 G/DL — SIGNIFICANT CHANGE UP (ref 32–37)
MCV RBC AUTO: 86.4 FL — SIGNIFICANT CHANGE UP (ref 81–99)
MONOCYTES # BLD AUTO: 0.82 K/UL — HIGH (ref 0.1–0.6)
MONOCYTES NFR BLD AUTO: 8.2 % — SIGNIFICANT CHANGE UP (ref 1.7–9.3)
NEUTROPHILS # BLD AUTO: 4.67 K/UL — SIGNIFICANT CHANGE UP (ref 1.4–6.5)
NEUTROPHILS NFR BLD AUTO: 47 % — SIGNIFICANT CHANGE UP (ref 42.2–75.2)
NRBC # BLD: 0 /100 WBCS — SIGNIFICANT CHANGE UP (ref 0–0)
PHOSPHATE SERPL-MCNC: 4.8 MG/DL — SIGNIFICANT CHANGE UP (ref 2.1–4.9)
PLATELET # BLD AUTO: 238 K/UL — SIGNIFICANT CHANGE UP (ref 130–400)
POTASSIUM SERPL-MCNC: 4.1 MMOL/L — SIGNIFICANT CHANGE UP (ref 3.5–5)
POTASSIUM SERPL-MCNC: 6.2 MMOL/L — CRITICAL HIGH (ref 3.5–5)
POTASSIUM SERPL-SCNC: 4.1 MMOL/L — SIGNIFICANT CHANGE UP (ref 3.5–5)
POTASSIUM SERPL-SCNC: 6.2 MMOL/L — CRITICAL HIGH (ref 3.5–5)
PROT SERPL-MCNC: 7.1 G/DL — SIGNIFICANT CHANGE UP (ref 6–8)
PROT SERPL-MCNC: 7.6 G/DL — SIGNIFICANT CHANGE UP (ref 6–8)
RBC # BLD: 5.31 M/UL — SIGNIFICANT CHANGE UP (ref 4.2–5.4)
RBC # FLD: 13 % — SIGNIFICANT CHANGE UP (ref 11.5–14.5)
SODIUM SERPL-SCNC: 134 MMOL/L — LOW (ref 135–146)
SODIUM SERPL-SCNC: 135 MMOL/L — SIGNIFICANT CHANGE UP (ref 135–146)
SPECIMEN SOURCE: SIGNIFICANT CHANGE UP
TROPONIN T SERPL-MCNC: <0.01 NG/ML — SIGNIFICANT CHANGE UP
WBC # BLD: 9.95 K/UL — SIGNIFICANT CHANGE UP (ref 4.8–10.8)
WBC # FLD AUTO: 9.95 K/UL — SIGNIFICANT CHANGE UP (ref 4.8–10.8)

## 2022-05-11 PROCEDURE — 93010 ELECTROCARDIOGRAM REPORT: CPT

## 2022-05-11 PROCEDURE — 71045 X-RAY EXAM CHEST 1 VIEW: CPT | Mod: 26

## 2022-05-11 PROCEDURE — 99233 SBSQ HOSP IP/OBS HIGH 50: CPT

## 2022-05-11 RX ORDER — BELIMUMAB 200 MG/ML
200 SOLUTION SUBCUTANEOUS
Qty: 0 | Refills: 0 | DISCHARGE

## 2022-05-11 RX ORDER — METOCLOPRAMIDE HCL 10 MG
1 TABLET ORAL
Qty: 0 | Refills: 0 | DISCHARGE

## 2022-05-11 RX ORDER — MORPHINE SULFATE 50 MG/1
2 CAPSULE, EXTENDED RELEASE ORAL
Qty: 0 | Refills: 0 | DISCHARGE

## 2022-05-11 RX ADMIN — Medication 50 MILLIGRAM(S): at 17:08

## 2022-05-11 RX ADMIN — Medication 100 MILLIGRAM(S): at 21:20

## 2022-05-11 RX ADMIN — Medication 100 MILLIGRAM(S): at 09:59

## 2022-05-11 RX ADMIN — Medication 100 MILLIGRAM(S): at 13:45

## 2022-05-11 RX ADMIN — Medication 81 MILLIGRAM(S): at 11:41

## 2022-05-11 RX ADMIN — PREGABALIN 1000 MICROGRAM(S): 225 CAPSULE ORAL at 11:42

## 2022-05-11 RX ADMIN — Medication 1 MILLIGRAM(S): at 22:45

## 2022-05-11 RX ADMIN — MORPHINE SULFATE 120 MILLIGRAM(S): 50 CAPSULE, EXTENDED RELEASE ORAL at 06:34

## 2022-05-11 RX ADMIN — Medication 3 MILLILITER(S): at 08:40

## 2022-05-11 RX ADMIN — MORPHINE SULFATE 120 MILLIGRAM(S): 50 CAPSULE, EXTENDED RELEASE ORAL at 20:33

## 2022-05-11 RX ADMIN — FONDAPARINUX SODIUM 7.5 MILLIGRAM(S): 2.5 INJECTION, SOLUTION SUBCUTANEOUS at 11:42

## 2022-05-11 RX ADMIN — Medication 1 APPLICATION(S): at 07:09

## 2022-05-11 RX ADMIN — MORPHINE SULFATE 120 MILLIGRAM(S): 50 CAPSULE, EXTENDED RELEASE ORAL at 14:26

## 2022-05-11 RX ADMIN — Medication 1 MILLIGRAM(S): at 11:41

## 2022-05-11 NOTE — PROGRESS NOTE ADULT - SUBJECTIVE AND OBJECTIVE BOX
Hospital Day:  6d    Subjective:    No acute events overnight.    Chart reviewed, patient seen and examined at bedside in AM. Patient appears comfortable while at rest in bed. No other complaints.     Denies headaches, changes in vision, chest pains, SOB, n/v/d, abd pain, constipation, changes in urination, pain on urination, weakness, fatigue, joint pain or muscle pain.       Past Medical Hx:   Lupus    AICD (automatic cardioverter/defibrillator) present    Throat cancer    RA (rheumatoid arthritis)    HTN (hypertension)    CHF (congestive heart failure)    COPD (chronic obstructive pulmonary disease)    DVT (deep venous thrombosis)    Pulmonary embolus    Pulmonary embolism    History of laryngeal cancer    GERD (gastroesophageal reflux disease)    Cervical cancer      Past Sx:  S/P appendectomy    S/P cholecystectomy    History of laryngeal cancer    AICD (automatic cardioverter/defibrillator) present    S/P hysterectomy    History of back surgery    H/O foot surgery    S/P eye surgery    Subclavian arterial stenosis      Allergies:  adhesives (Rash; Urticaria)  Arava (Anaphylaxis; Angioedema)  Avelox (Other)  Plaquenil (Other)  Vantin (Other (Mild))    Current Meds:   Standng Meds:  albuterol/ipratropium for Nebulization 3 milliLiter(s) Nebulizer every 6 hours  aspirin enteric coated 81 milliGRAM(s) Oral daily  budesonide 160 MICROgram(s)/formoterol 4.5 MICROgram(s) Inhaler 2 Puff(s) Inhalation two times a day  cyanocobalamin 1000 MICROGram(s) Oral daily  folic acid 1 milliGRAM(s) Oral daily  fondaparinux Injectable 7.5 milliGRAM(s) SubCutaneous daily  hydrALAZINE 50 milliGRAM(s) Oral two times a day  metoprolol succinate  milliGRAM(s) Oral daily  morphine ER Tablet 120 milliGRAM(s) Oral <User Schedule>  silver sulfADIAZINE 1% Cream 1 Application(s) Topical two times a day    PRN Meds:  acetaminophen     Tablet .. 650 milliGRAM(s) Oral every 6 hours PRN Temp greater or equal to 38C (100.4F), Mild Pain (1 - 3)  ALPRAZolam 1 milliGRAM(s) Oral every 6 hours PRN anxiety  aluminum hydroxide/magnesium hydroxide/simethicone Suspension 30 milliLiter(s) Oral every 4 hours PRN Dyspepsia  melatonin 3 milliGRAM(s) Oral at bedtime PRN Insomnia  ondansetron    Tablet 4 milliGRAM(s) Oral every 8 hours PRN Nausea and/or Vomiting  simethicone 80 milliGRAM(s) Chew two times a day PRN Indigestion    HOME MEDICATIONS:  albuterol 90 mcg/inh inhalation aerosol: 2 puff(s) inhaled every 6 hours  ALPRAZolam 1 mg oral tablet: 2 tab(s) orally 3 times a day, As Needed  Benlysta: 200 milligram(s) intravenous once a week  cyanocobalamin 1000 mcg/mL injectable solution: 1 milliliter(s) injectable once a week  folic acid 1 mg oral tablet: 1 tab(s) orally once a day  fondaparinux 7.5 mg/0.6 mL subcutaneous solution: 0.6 milliliter(s) subcutaneous once a day  hydrALAZINE 50 mg oral tablet: 1 tab(s) orally 2 times a day  Metoprolol Succinate  mg oral tablet, extended release: 1 tab(s) orally once a day  MS Contin 60 mg oral tablet, extended release: 2 tab(s) orally 3 times a day (after meals)  Reglan 10 mg oral tablet: 1 tab(s) orally 4 times a day (before meals and at bedtime)  Trelegy Ellipta 100 mcg-62.5 mcg-25 mcg/inh inhalation powder: 1 puff(s) inhaled once a day      Vital Signs:   T(F): 98 (22 @ 04:46), Max: 98 (22 @ 04:46)  HR: 61 (22 @ 04:46) (61 - 83)  BP: 97/51 (22 @ 04:46) (97/51 - 156/74)  RR: 18 (22 @ 04:46) (18 - 18)  SpO2: 96% (22 @ 04:46) (92% - 97%)      22 @ 07:01  -  05-10-22 @ 07:00  --------------------------------------------------------  IN: 120 mL / OUT: 0 mL / NET: 120 mL        Physical Exam:   CONSTITUTIONAL: Well-developed; well-nourished; in no acute distress, speaking in full sentences  HEAD: Normocephalic; atraumatic  EYES: PERRL, EOMI, no conjunctival erythema  NECK: Supple; non tender, FROM  CARD: +S1, S2 Regular rate and rhythm.  RESP: CTABL  ABD: soft nontender, nondistended, no rebound, no guarding, no rigidity  EXT: moves all extremities,  No clubbing, cyanosis or edema.   NEURO: Alert, oriented, grossly unremarkable, no focal deficits, cn ii-xii grossly intact  PSYCH: Cooperative, appropriate         Labs:                         14.6   6.09  )-----------( 183      ( 10 May 2022 08:23 )             42.8     Neutophil% 48.5, Lymphocyte% 35.1, Monocyte% 10.2, Bands% 0.3 05-10-22 @ 08:23    10 May 2022 08:23    137    |  99     |  15     ----------------------------<  94     4.5     |  21     |  0.9      Ca    9.6        10 May 2022 08:23  Phos  4.7       10 May 2022 08:23  Mg     2.0       10 May 2022 08:23    TPro  6.7    /  Alb  4.2    /  TBili  0.3    /  DBili  x      /  AST  21     /  ALT  20     /  AlkPhos  83     10 May 2022 08:23                    Urinalysis Basic - ( 08 May 2022 10:25 )    Color: Yellow / Appearance: Clear / S.028 / pH: x  Gluc: x / Ketone: Trace  / Bili: Negative / Urobili: <2 mg/dL   Blood: x / Protein: Trace / Nitrite: Negative   Leuk Esterase: Negative / RBC: 4 /HPF / WBC 2 /HPF   Sq Epi: x / Non Sq Epi: 13 /HPF / Bacteria: Negative          Culture - Blood (collected 22 @ 07:53)  Source: .Blood Blood  Preliminary Report (22 @ 15:06):    No growth to date.    Culture - Blood (collected 22 @ 17:13)  Source: .Blood Blood  Final Report (05-10-22 @ 23:00):    No Growth Final    Culture - Blood (collected 22 @ 17:13)  Source: .Blood Blood  Final Report (05-10-22 @ 23:00):    No Growth Final        =============    CAPILLARY BLOOD GLUCOSE            LIVER FUNCTIONS - ( 10 May 2022 08:23 )  Alb: 4.2 g/dL / Pro: 6.7 g/dL / ALK PHOS: 83 U/L / ALT: 20 U/L / AST: 21 U/L / GGT: x             Culture - Urine (collected 08 May 2022 10:25)  Source: Clean Catch Clean Catch (Midstream)  Final Report (09 May 2022 14:29):    No growth        Radiology:   IMAGING:             Hospital Day:  6d    Subjective:    No acute events overnight.    Chart reviewed, patient seen and examined at bedside in AM. Patient appears comfortable while at rest in bed. No other complaints.       Past Medical Hx:   Lupus    AICD (automatic cardioverter/defibrillator) present    Throat cancer    RA (rheumatoid arthritis)    HTN (hypertension)    CHF (congestive heart failure)    COPD (chronic obstructive pulmonary disease)    DVT (deep venous thrombosis)    Pulmonary embolus    Pulmonary embolism    History of laryngeal cancer    GERD (gastroesophageal reflux disease)    Cervical cancer      Past Sx:  S/P appendectomy    S/P cholecystectomy    History of laryngeal cancer    AICD (automatic cardioverter/defibrillator) present    S/P hysterectomy    History of back surgery    H/O foot surgery    S/P eye surgery    Subclavian arterial stenosis      Allergies:  adhesives (Rash; Urticaria)  Arava (Anaphylaxis; Angioedema)  Avelox (Other)  Plaquenil (Other)  Vantin (Other (Mild))    Current Meds:   Standng Meds:  albuterol/ipratropium for Nebulization 3 milliLiter(s) Nebulizer every 6 hours  aspirin enteric coated 81 milliGRAM(s) Oral daily  budesonide 160 MICROgram(s)/formoterol 4.5 MICROgram(s) Inhaler 2 Puff(s) Inhalation two times a day  cyanocobalamin 1000 MICROGram(s) Oral daily  folic acid 1 milliGRAM(s) Oral daily  fondaparinux Injectable 7.5 milliGRAM(s) SubCutaneous daily  hydrALAZINE 50 milliGRAM(s) Oral two times a day  metoprolol succinate  milliGRAM(s) Oral daily  morphine ER Tablet 120 milliGRAM(s) Oral <User Schedule>  silver sulfADIAZINE 1% Cream 1 Application(s) Topical two times a day    PRN Meds:  acetaminophen     Tablet .. 650 milliGRAM(s) Oral every 6 hours PRN Temp greater or equal to 38C (100.4F), Mild Pain (1 - 3)  ALPRAZolam 1 milliGRAM(s) Oral every 6 hours PRN anxiety  aluminum hydroxide/magnesium hydroxide/simethicone Suspension 30 milliLiter(s) Oral every 4 hours PRN Dyspepsia  melatonin 3 milliGRAM(s) Oral at bedtime PRN Insomnia  ondansetron    Tablet 4 milliGRAM(s) Oral every 8 hours PRN Nausea and/or Vomiting  simethicone 80 milliGRAM(s) Chew two times a day PRN Indigestion    HOME MEDICATIONS:  albuterol 90 mcg/inh inhalation aerosol: 2 puff(s) inhaled every 6 hours  ALPRAZolam 1 mg oral tablet: 2 tab(s) orally 3 times a day, As Needed  Benlysta: 200 milligram(s) intravenous once a week  cyanocobalamin 1000 mcg/mL injectable solution: 1 milliliter(s) injectable once a week  folic acid 1 mg oral tablet: 1 tab(s) orally once a day  fondaparinux 7.5 mg/0.6 mL subcutaneous solution: 0.6 milliliter(s) subcutaneous once a day  hydrALAZINE 50 mg oral tablet: 1 tab(s) orally 2 times a day  Metoprolol Succinate  mg oral tablet, extended release: 1 tab(s) orally once a day  MS Contin 60 mg oral tablet, extended release: 2 tab(s) orally 3 times a day (after meals)  Reglan 10 mg oral tablet: 1 tab(s) orally 4 times a day (before meals and at bedtime)  Trelegy Ellipta 100 mcg-62.5 mcg-25 mcg/inh inhalation powder: 1 puff(s) inhaled once a day      Vital Signs:   T(F): 98 (22 @ 04:46), Max: 98 (22 @ 04:46)  HR: 61 (22 @ 04:46) (61 - 83)  BP: 97/51 (22 @ 04:46) (97/51 - 156/74)  RR: 18 (22 @ 04:46) (18 - 18)  SpO2: 96% (22 @ 04:46) (92% - 97%)      22 @ 07:01  -  05-10-22 @ 07:00  --------------------------------------------------------  IN: 120 mL / OUT: 0 mL / NET: 120 mL        Physical Exam:   CONSTITUTIONAL: Well-developed; well-nourished; in no acute distress, speaking in full sentences  HEAD: Normocephalic; atraumatic  EYES: PERRL, EOMI, no conjunctival erythema  NECK: Supple; non tender, FROM  CARD: +S1, S2 Regular rate and rhythm.  RESP: CTABL  ABD: soft nontender, nondistended, no rebound, no guarding, no rigidity  EXT: moves all extremities,  No clubbing, cyanosis or edema.   NEURO: Alert, oriented, grossly unremarkable, no focal deficits, cn ii-xii grossly intact  PSYCH: Cooperative, appropriate         Labs:                         14.6   6.09  )-----------( 183      ( 10 May 2022 08:23 )             42.8     Neutophil% 48.5, Lymphocyte% 35.1, Monocyte% 10.2, Bands% 0.3 05-10-22 @ 08:23    10 May 2022 08:23    137    |  99     |  15     ----------------------------<  94     4.5     |  21     |  0.9      Ca    9.6        10 May 2022 08:23  Phos  4.7       10 May 2022 08:23  Mg     2.0       10 May 2022 08:23    TPro  6.7    /  Alb  4.2    /  TBili  0.3    /  DBili  x      /  AST  21     /  ALT  20     /  AlkPhos  83     10 May 2022 08:23                    Urinalysis Basic - ( 08 May 2022 10:25 )    Color: Yellow / Appearance: Clear / S.028 / pH: x  Gluc: x / Ketone: Trace  / Bili: Negative / Urobili: <2 mg/dL   Blood: x / Protein: Trace / Nitrite: Negative   Leuk Esterase: Negative / RBC: 4 /HPF / WBC 2 /HPF   Sq Epi: x / Non Sq Epi: 13 /HPF / Bacteria: Negative          Culture - Blood (collected 22 @ 07:53)  Source: .Blood Blood  Preliminary Report (22 @ 15:06):    No growth to date.    Culture - Blood (collected 22 @ 17:13)  Source: .Blood Blood  Final Report (05-10-22 @ 23:00):    No Growth Final    Culture - Blood (collected 22 @ 17:13)  Source: .Blood Blood  Final Report (05-10-22 @ 23:00):    No Growth Final        =============    CAPILLARY BLOOD GLUCOSE            LIVER FUNCTIONS - ( 10 May 2022 08:23 )  Alb: 4.2 g/dL / Pro: 6.7 g/dL / ALK PHOS: 83 U/L / ALT: 20 U/L / AST: 21 U/L / GGT: x             Culture - Urine (collected 08 May 2022 10:25)  Source: Clean Catch Clean Catch (Midstream)  Final Report (09 May 2022 14:29):    No growth        Radiology:   IMAGING:             Hospital Day:  6d    Subjective:    No acute events overnight.    Chart reviewed, patient seen and examined at bedside in AM. Patient appears comfortable while at rest in bed. No other complaints.       Past Medical Hx:   Lupus    AICD (automatic cardioverter/defibrillator) present    Throat cancer    RA (rheumatoid arthritis)    HTN (hypertension)    CHF (congestive heart failure)    COPD (chronic obstructive pulmonary disease)    DVT (deep venous thrombosis)    Pulmonary embolus    Pulmonary embolism    History of laryngeal cancer    GERD (gastroesophageal reflux disease)    Cervical cancer      Past Sx:  S/P appendectomy    S/P cholecystectomy    History of laryngeal cancer    AICD (automatic cardioverter/defibrillator) present    S/P hysterectomy    History of back surgery    H/O foot surgery    S/P eye surgery    Subclavian arterial stenosis      Allergies:  adhesives (Rash; Urticaria)  Arava (Anaphylaxis; Angioedema)  Avelox (Other)  Plaquenil (Other)  Vantin (Other (Mild))    Current Meds:   Standng Meds:  albuterol/ipratropium for Nebulization 3 milliLiter(s) Nebulizer every 6 hours  aspirin enteric coated 81 milliGRAM(s) Oral daily  budesonide 160 MICROgram(s)/formoterol 4.5 MICROgram(s) Inhaler 2 Puff(s) Inhalation two times a day  cyanocobalamin 1000 MICROGram(s) Oral daily  folic acid 1 milliGRAM(s) Oral daily  fondaparinux Injectable 7.5 milliGRAM(s) SubCutaneous daily  hydrALAZINE 50 milliGRAM(s) Oral two times a day  metoprolol succinate  milliGRAM(s) Oral daily  morphine ER Tablet 120 milliGRAM(s) Oral <User Schedule>  silver sulfADIAZINE 1% Cream 1 Application(s) Topical two times a day    PRN Meds:  acetaminophen     Tablet .. 650 milliGRAM(s) Oral every 6 hours PRN Temp greater or equal to 38C (100.4F), Mild Pain (1 - 3)  ALPRAZolam 1 milliGRAM(s) Oral every 6 hours PRN anxiety  aluminum hydroxide/magnesium hydroxide/simethicone Suspension 30 milliLiter(s) Oral every 4 hours PRN Dyspepsia  melatonin 3 milliGRAM(s) Oral at bedtime PRN Insomnia  ondansetron    Tablet 4 milliGRAM(s) Oral every 8 hours PRN Nausea and/or Vomiting  simethicone 80 milliGRAM(s) Chew two times a day PRN Indigestion    HOME MEDICATIONS:  albuterol 90 mcg/inh inhalation aerosol: 2 puff(s) inhaled every 6 hours  ALPRAZolam 1 mg oral tablet: 2 tab(s) orally 3 times a day, As Needed  Benlysta: 200 milligram(s) intravenous once a week  cyanocobalamin 1000 mcg/mL injectable solution: 1 milliliter(s) injectable once a week  folic acid 1 mg oral tablet: 1 tab(s) orally once a day  fondaparinux 7.5 mg/0.6 mL subcutaneous solution: 0.6 milliliter(s) subcutaneous once a day  hydrALAZINE 50 mg oral tablet: 1 tab(s) orally 2 times a day  Metoprolol Succinate  mg oral tablet, extended release: 1 tab(s) orally once a day  MS Contin 60 mg oral tablet, extended release: 2 tab(s) orally 3 times a day (after meals)  Reglan 10 mg oral tablet: 1 tab(s) orally 4 times a day (before meals and at bedtime)  Trelegy Ellipta 100 mcg-62.5 mcg-25 mcg/inh inhalation powder: 1 puff(s) inhaled once a day      Vital Signs:   T(F): 98 (22 @ 04:46), Max: 98 (22 @ 04:46)  HR: 61 (22 @ 04:46) (61 - 83)  BP: 97/51 (22 @ 04:46) (97/51 - 156/74)  RR: 18 (22 @ 04:46) (18 - 18)  SpO2: 96% (22 @ 04:46) (92% - 97%)      22 @ 07:01  -  05-10-22 @ 07:00  --------------------------------------------------------  IN: 120 mL / OUT: 0 mL / NET: 120 mL        Physical Exam:   General: NAD, AAO3  HEENT:  EOMI, no LAD  CV: S1 S2  Resp: decreased breath sounds at bases  GI: NT/ND/S +BS  MS: Lt calf +echymoses, tenderness, decreased swelling and erythema  Neuro: nonfocal, +reflexes thruout      Labs:                         14.6   6.09  )-----------( 183      ( 10 May 2022 08:23 )             42.8     Neutophil% 48.5, Lymphocyte% 35.1, Monocyte% 10.2, Bands% 0.3 05-10-22 @ 08:23    10 May 2022 08:23    137    |  99     |  15     ----------------------------<  94     4.5     |  21     |  0.9      Ca    9.6        10 May 2022 08:23  Phos  4.7       10 May 2022 08:23  Mg     2.0       10 May 2022 08:23    TPro  6.7    /  Alb  4.2    /  TBili  0.3    /  DBili  x      /  AST  21     /  ALT  20     /  AlkPhos  83     10 May 2022 08:23                    Urinalysis Basic - ( 08 May 2022 10:25 )    Color: Yellow / Appearance: Clear / S.028 / pH: x  Gluc: x / Ketone: Trace  / Bili: Negative / Urobili: <2 mg/dL   Blood: x / Protein: Trace / Nitrite: Negative   Leuk Esterase: Negative / RBC: 4 /HPF / WBC 2 /HPF   Sq Epi: x / Non Sq Epi: 13 /HPF / Bacteria: Negative          Culture - Blood (collected 22 @ 07:53)  Source: .Blood Blood  Preliminary Report (22 @ 15:06):    No growth to date.    Culture - Blood (collected 22 @ 17:13)  Source: .Blood Blood  Final Report (05-10-22 @ 23:00):    No Growth Final    Culture - Blood (collected 22 @ 17:13)  Source: .Blood Blood  Final Report (05-10-22 @ 23:00):    No Growth Final        =============    CAPILLARY BLOOD GLUCOSE            LIVER FUNCTIONS - ( 10 May 2022 08:23 )  Alb: 4.2 g/dL / Pro: 6.7 g/dL / ALK PHOS: 83 U/L / ALT: 20 U/L / AST: 21 U/L / GGT: x             Culture - Urine (collected 08 May 2022 10:25)  Source: Clean Catch Clean Catch (Midstream)  Final Report (09 May 2022 14:29):    No growth        Radiology:   IMAGING:             Hospital Day:  6d    Subjective:    No acute events overnight.    Chart reviewed, patient seen and examined at bedside in AM. Patient appears comfortable while at rest in bed. Reports new onset chest tightness associated with cough w/ phlegm. Patient concerned she might have PNA.       Past Medical Hx:   Lupus    AICD (automatic cardioverter/defibrillator) present    Throat cancer    RA (rheumatoid arthritis)    HTN (hypertension)    CHF (congestive heart failure)    COPD (chronic obstructive pulmonary disease)    DVT (deep venous thrombosis)    Pulmonary embolus    Pulmonary embolism    History of laryngeal cancer    GERD (gastroesophageal reflux disease)    Cervical cancer      Past Sx:  S/P appendectomy    S/P cholecystectomy    History of laryngeal cancer    AICD (automatic cardioverter/defibrillator) present    S/P hysterectomy    History of back surgery    H/O foot surgery    S/P eye surgery    Subclavian arterial stenosis      Allergies:  adhesives (Rash; Urticaria)  Arava (Anaphylaxis; Angioedema)  Avelox (Other)  Plaquenil (Other)  Vantin (Other (Mild))    Current Meds:   Standng Meds:  albuterol/ipratropium for Nebulization 3 milliLiter(s) Nebulizer every 6 hours  aspirin enteric coated 81 milliGRAM(s) Oral daily  budesonide 160 MICROgram(s)/formoterol 4.5 MICROgram(s) Inhaler 2 Puff(s) Inhalation two times a day  cyanocobalamin 1000 MICROGram(s) Oral daily  folic acid 1 milliGRAM(s) Oral daily  fondaparinux Injectable 7.5 milliGRAM(s) SubCutaneous daily  hydrALAZINE 50 milliGRAM(s) Oral two times a day  metoprolol succinate  milliGRAM(s) Oral daily  morphine ER Tablet 120 milliGRAM(s) Oral <User Schedule>  silver sulfADIAZINE 1% Cream 1 Application(s) Topical two times a day    PRN Meds:  acetaminophen     Tablet .. 650 milliGRAM(s) Oral every 6 hours PRN Temp greater or equal to 38C (100.4F), Mild Pain (1 - 3)  ALPRAZolam 1 milliGRAM(s) Oral every 6 hours PRN anxiety  aluminum hydroxide/magnesium hydroxide/simethicone Suspension 30 milliLiter(s) Oral every 4 hours PRN Dyspepsia  melatonin 3 milliGRAM(s) Oral at bedtime PRN Insomnia  ondansetron    Tablet 4 milliGRAM(s) Oral every 8 hours PRN Nausea and/or Vomiting  simethicone 80 milliGRAM(s) Chew two times a day PRN Indigestion    HOME MEDICATIONS:  albuterol 90 mcg/inh inhalation aerosol: 2 puff(s) inhaled every 6 hours  ALPRAZolam 1 mg oral tablet: 2 tab(s) orally 3 times a day, As Needed  Benlysta: 200 milligram(s) intravenous once a week  cyanocobalamin 1000 mcg/mL injectable solution: 1 milliliter(s) injectable once a week  folic acid 1 mg oral tablet: 1 tab(s) orally once a day  fondaparinux 7.5 mg/0.6 mL subcutaneous solution: 0.6 milliliter(s) subcutaneous once a day  hydrALAZINE 50 mg oral tablet: 1 tab(s) orally 2 times a day  Metoprolol Succinate  mg oral tablet, extended release: 1 tab(s) orally once a day  MS Contin 60 mg oral tablet, extended release: 2 tab(s) orally 3 times a day (after meals)  Reglan 10 mg oral tablet: 1 tab(s) orally 4 times a day (before meals and at bedtime)  Trelegy Ellipta 100 mcg-62.5 mcg-25 mcg/inh inhalation powder: 1 puff(s) inhaled once a day      Vital Signs:   T(F): 98 (22 @ 04:46), Max: 98 (22 @ 04:46)  HR: 61 (22 @ 04:46) (61 - 83)  BP: 97/51 (22 @ 04:46) (97/51 - 156/74)  RR: 18 (22 @ 04:46) (18 - 18)  SpO2: 96% (22 @ 04:46) (92% - 97%)      22 @ 07:01  -  05-10-22 @ 07:00  --------------------------------------------------------  IN: 120 mL / OUT: 0 mL / NET: 120 mL        Physical Exam:   General: NAD, AAO3  HEENT:  EOMI, no LAD  CV: S1 S2  Resp: decreased breath sounds at bases  GI: NT/ND/S +BS  MS: Lt calf +echymoses, tenderness, decreased swelling and erythema  Neuro: nonfocal, +reflexes thruout      Labs:                         14.6   6.09  )-----------( 183      ( 10 May 2022 08:23 )             42.8     Neutophil% 48.5, Lymphocyte% 35.1, Monocyte% 10.2, Bands% 0.3 05-10-22 @ 08:23    10 May 2022 08:23    137    |  99     |  15     ----------------------------<  94     4.5     |  21     |  0.9      Ca    9.6        10 May 2022 08:23  Phos  4.7       10 May 2022 08:23  Mg     2.0       10 May 2022 08:23    TPro  6.7    /  Alb  4.2    /  TBili  0.3    /  DBili  x      /  AST  21     /  ALT  20     /  AlkPhos  83     10 May 2022 08:23                    Urinalysis Basic - ( 08 May 2022 10:25 )    Color: Yellow / Appearance: Clear / S.028 / pH: x  Gluc: x / Ketone: Trace  / Bili: Negative / Urobili: <2 mg/dL   Blood: x / Protein: Trace / Nitrite: Negative   Leuk Esterase: Negative / RBC: 4 /HPF / WBC 2 /HPF   Sq Epi: x / Non Sq Epi: 13 /HPF / Bacteria: Negative          Culture - Blood (collected 22 @ 07:53)  Source: .Blood Blood  Preliminary Report (22 @ 15:06):    No growth to date.    Culture - Blood (collected 22 @ 17:13)  Source: .Blood Blood  Final Report (05-10-22 @ 23:00):    No Growth Final    Culture - Blood (collected 22 @ 17:13)  Source: .Blood Blood  Final Report (05-10-22 @ 23:00):    No Growth Final        =============    CAPILLARY BLOOD GLUCOSE            LIVER FUNCTIONS - ( 10 May 2022 08:23 )  Alb: 4.2 g/dL / Pro: 6.7 g/dL / ALK PHOS: 83 U/L / ALT: 20 U/L / AST: 21 U/L / GGT: x             Culture - Urine (collected 08 May 2022 10:25)  Source: Clean Catch Clean Catch (Midstream)  Final Report (09 May 2022 14:29):    No growth        Radiology:   IMAGING:

## 2022-05-11 NOTE — PROGRESS NOTE ADULT - ASSESSMENT
51 year old woman with PMH of SLE  lupus with lupus anticoagulant, antiphospholipid syndrome,  rheumatoid arthritis, cervical cancer sp hysterectomy,  laryngeal cancer sp CT, RT, CHF s/p AICD+PPM, DVT/PE in 2015 on Fondaparinux, NSTEMI in 2015, subclavian stenosis s/p stent placement, HTN, HLD, PPM/AICD presented to the hospital for a wound on the calf of the left lower extremity. pt has had this wound since last week with swelling of the calf, erythema, fever chills. She's been taking Augmentin for the last 6 days but has been worsening with black eschar. pt went to Dr Guan who performed a duplex, was negative    A/P:   #Left lower lesion ?cellulitis with small abscess   -Blood cx no growth  -Arterial Duplex was normal.   -No fever, Continue Clindamycin IV 600mg q 8hrs. If collection is available will send wound cx.   -Burn evaluated   - ID (Dr. Novak) states possible vasculitis; consulted rheum per ID recs  - consulted rheum to r/o vasculitis as per ID (5/9)  - f/u rheum labs (e.g. C3, C4, etc.) (collecting 5/9)   - f/u CTA LE with Iv contrast     #History of VTE/ PE on fondaparinux  - continue Fondaparinux for now.   -reports resolution of LUE edema    #History of long-QT syndrome and VT sp AICD  #HFpEF  - EF 63% 2019  - EP on board  - s/p AICD placement (s/p laser lead extraction of ICD leads and device and reimplanted with new RA/RV lead and new ICD device (Medtronic) in 2021)    #SLE/RA  #APLAS  - not in flare  - on Fondaparinux  -rheum f/u    #COPD  - inhalers and nebs for COPD   -counselled pt to avoid smoking and secondary exposure  -SpO2 90-91% on RA (5/10)    #Chronic pain syndrome  -I-Stop checked and meds ordered    #anxiety  -Xanax PRN    #MISC  DVT ppx: Fondaparinux  GI PPX not needed  DIET dash  Code status: full code    #other  - pending imaging of tib-fib  - pending MRSA swab  - lost IV access on 5/10; attempted - unable to place w/ US; switched to po clindamycin & ondansetron po        51 year old woman with PMH of SLE  lupus with lupus anticoagulant, antiphospholipid syndrome,  rheumatoid arthritis, cervical cancer sp hysterectomy,  laryngeal cancer sp CT, RT, CHF s/p AICD+PPM, DVT/PE in 2015 on Fondaparinux, NSTEMI in 2015, subclavian stenosis s/p stent placement, HTN, HLD, PPM/AICD presented to the hospital for a wound on the calf of the left lower extremity. pt has had this wound since last week with swelling of the calf, erythema, fever chills. She's been taking Augmentin for the last 6 days but has been worsening with black eschar. pt went to Dr Guan who performed a duplex, was negative    A/P:   #Left lower lesion ?cellulitis with small abscess   -Blood cx no growth  -Arterial Duplex was normal.   -No fever, Continue Clindamycin IV 600mg q 8hrs. If collection is available will send wound cx.   -Burn evaluated   - ID (Dr. Novak) states possible vasculitis; consulted rheum per ID recs  - consulted rheum to r/o vasculitis as per ID (5/9)  - f/u rheum labs (e.g. C3, C4, etc.) (collecting 5/9)   - f/u CTA LE with Iv contrast     #History of VTE/ PE on fondaparinux  - continue Fondaparinux for now.   -reports resolution of LUE edema    #History of long-QT syndrome and VT sp AICD  #HFpEF  - EF 63% 2019  - EP on board  - s/p AICD placement (s/p laser lead extraction of ICD leads and device and reimplanted with new RA/RV lead and new ICD device (Medtronic) in 2021)    #SLE/RA  #APLAS  - not in flare  - on Fondaparinux  -rheum f/u    #COPD  - inhalers and nebs for COPD   -counselled pt to avoid smoking and secondary exposure  -SpO2 90-91% on RA (5/10)    #Chronic pain syndrome  -I-Stop checked and meds ordered    #anxiety  -Xanax PRN    #MISC  DVT ppx: Fondaparinux  GI PPX not needed  DIET dash  Code status: full code    #other  - pending MRSA swab  - IV line placed; restarted on IV Antibiotics        51 year old woman with PMH of SLE  lupus with lupus anticoagulant, antiphospholipid syndrome,  rheumatoid arthritis, cervical cancer sp hysterectomy,  laryngeal cancer sp CT, RT, CHF s/p AICD+PPM, DVT/PE in 2015 on Fondaparinux, NSTEMI in 2015, subclavian stenosis s/p stent placement, HTN, HLD, PPM/AICD presented to the hospital for a wound on the calf of the left lower extremity. pt has had this wound since last week with swelling of the calf, erythema, fever chills. She's been taking Augmentin for the last 6 days but has been worsening with black eschar. pt went to Dr Guan who performed a duplex, was negative    A/P:   #Left lower lesion ?cellulitis with small abscess   -Blood cx no growth  -Arterial Duplex was normal.   -No fever, Continue Clindamycin IV 600mg q 8hrs. If collection is available will send wound cx.   -Burn evaluated   - ID (Dr. Novak) states possible vasculitis; consulted rheum per ID recs  - consulted rheum to r/o vasculitis as per ID (5/9)  - f/u rheum labs (e.g. C3, C4, etc.) (collecting 5/9)   - f/u CTA LE with Iv contrast   - Patient did not tolerate PO Antibiotics overnight on 5/11; back on IV Antibiotics    #r/o PNA  - CXR (5/11)     #History of VTE/ PE on fondaparinux  - continue Fondaparinux for now.   -reports resolution of LUE edema    #History of long-QT syndrome and VT sp AICD  #HFpEF  - EF 63% 2019  - EP on board  - s/p AICD placement (s/p laser lead extraction of ICD leads and device and reimplanted with new RA/RV lead and new ICD device (Medtronic) in 2021)    #SLE/RA  #APLAS  - not in flare  - on Fondaparinux  -rheum f/u    #COPD  - inhalers and nebs for COPD   -counselled pt to avoid smoking and secondary exposure  -SpO2 90-91% on RA (5/10)    #Chronic pain syndrome  -I-Stop checked and meds ordered    #anxiety  -Xanax PRN    #MISC  DVT ppx: Fondaparinux  GI PPX not needed  DIET dash  Code status: full code    #other  - pending MRSA swab  - IV line placed; restarted on IV Antibiotics

## 2022-05-11 NOTE — PROGRESS NOTE ADULT - SUBJECTIVE AND OBJECTIVE BOX
Patient is a 51y old  Female who presents with a chief complaint of left lower extremity wound (09 May 2022 13:37)    INTERVAL HPI/OVERNIGHT EVENTS: Patient was examined and seen at bedside. This morning pt is resting comfortably in bed and reports persistent Lt calf pain. Overall better but pain persists. No fever, productive cough but concerned about ?possible PNA.  ROS: Denies CP, SOB, AP, new weakness  All other systems reviewed and are within normal limits.  InitialHPI:  51 year old woman with PMH of SLE  lupus with lupus anticoagulant, antiphospholipid syndrome,  rheumatoid arthritis, cervical cancer sp hysterectomy,  laryngeal cancer sp CT, RT, CHF s/p AICD+PPM, DVT/PE in 2015 on Fondaparinux, NSTEMI in 2015, subclavian stenosis s/p stent placement, HTN, HLD, PPM/AICD presented to the hospital for a wound on the calf of the left lower extremity. pt has had this wound since last week with swelling of the calf, erythema, fever chills. She's been taking Augmentin for the last 6 days but has been worsening with black eschar. pt went to Dr Guan who performed a duplex, was negative  in the ED,pt's VS T(C): 36.8 (05-05-22 @ 14:33), Max: 36.8 (05-05-22 @ 14:33)  HR: 87 (05-05-22 @ 15:58) (87 - 114)  BP: 138/67 (05-05-22 @ 15:58) (138/67 - 142/91)  RR: 18 (05-05-22 @ 14:33) (18 - 18)  SpO2: 96% (05-05-22 @ 14:33) (96% - 96%), labs done were unremarkable. Duplex done as outpatient was negative.   pt received aztreonam, metronidazole, Vancomycin.   pt is admitted for workup/management (05 May 2022 20:56)    PAST MEDICAL & SURGICAL HISTORY:  Lupus    RA (rheumatoid arthritis)    HTN (hypertension)    CHF (congestive heart failure)    COPD (chronic obstructive pulmonary disease)    DVT (deep venous thrombosis)    Pulmonary embolism    History of laryngeal cancer    GERD (gastroesophageal reflux disease)    Cervical cancer    S/P appendectomy    S/P cholecystectomy    AICD (automatic cardioverter/defibrillator) present    S/P hysterectomy    History of back surgery    H/O foot surgery    S/P eye surgery    Subclavian arterial stenosis        General: NAD, AAO3  HEENT:  EOMI, no LAD  CV: S1 S2  Resp: decreased breath sounds at bases  GI: NT/ND/S +BS  MS: Lt calf decreased swelling. No erythema; initial pustule resolved; post calf +echymoses, small area of induration  Neuro: nonfocal, +reflexes thruout            MEDICATIONS  (STANDING):  albuterol/ipratropium for Nebulization 3 milliLiter(s) Nebulizer every 6 hours  aspirin enteric coated 81 milliGRAM(s) Oral daily  budesonide 160 MICROgram(s)/formoterol 4.5 MICROgram(s) Inhaler 2 Puff(s) Inhalation two times a day  clindamycin IVPB      clindamycin IVPB 600 milliGRAM(s) IV Intermittent every 8 hours  cyanocobalamin 1000 MICROGram(s) Oral daily  folic acid 1 milliGRAM(s) Oral daily  fondaparinux Injectable 7.5 milliGRAM(s) SubCutaneous daily  hydrALAZINE 50 milliGRAM(s) Oral two times a day  metoprolol succinate  milliGRAM(s) Oral daily  morphine ER Tablet 120 milliGRAM(s) Oral <User Schedule>    MEDICATIONS  (PRN):  acetaminophen     Tablet .. 650 milliGRAM(s) Oral every 6 hours PRN Temp greater or equal to 38C (100.4F), Mild Pain (1 - 3)  ALPRAZolam 1 milliGRAM(s) Oral every 6 hours PRN anxiety  aluminum hydroxide/magnesium hydroxide/simethicone Suspension 30 milliLiter(s) Oral every 4 hours PRN Dyspepsia  melatonin 3 milliGRAM(s) Oral at bedtime PRN Insomnia  ondansetron    Tablet 4 milliGRAM(s) Oral every 8 hours PRN Nausea and/or Vomiting  simethicone 80 milliGRAM(s) Chew two times a day PRN Indigestion    Home Medications:  albuterol 90 mcg/inh inhalation aerosol: 2 puff(s) inhaled every 6 hours, As Needed (11 May 2022 16:03)  ALPRAZolam 1 mg oral tablet: 2 tab(s) orally 3 times a day, As Needed (05 May 2022 21:12)  cyanocobalamin 1000 mcg/mL injectable solution: 1 milliliter(s) injectable once a week (05 May 2022 21:12)  folic acid 1 mg oral tablet: 1 tab(s) orally once a day (05 May 2022 21:12)  fondaparinux 7.5 mg/0.6 mL subcutaneous solution: 0.6 milliliter(s) subcutaneous once a day (05 May 2022 21:12)  hydrALAZINE 50 mg oral tablet: 1 tab(s) orally 2 times a day (05 May 2022 21:12)  Metoprolol Succinate  mg oral tablet, extended release: 1 tab(s) orally once a day (05 May 2022 21:12)  MS Contin 60 mg oral tablet, extended release: 2 tab(s) orally 3 times a day (after meals), As Needed (11 May 2022 16:03)  Trelegy Ellipta 100 mcg-62.5 mcg-25 mcg/inh inhalation powder: 1 puff(s) inhaled once a day (05 May 2022 21:12)    Vital Signs Last 24 Hrs  T(C): 36.2 (11 May 2022 13:30), Max: 36.7 (11 May 2022 04:46)  T(F): 97.2 (11 May 2022 13:30), Max: 98 (11 May 2022 04:46)  HR: 74 (11 May 2022 13:30) (61 - 74)  BP: 139/71 (11 May 2022 13:30) (97/51 - 139/71)  BP(mean): 94 (10 May 2022 18:22) (94 - 94)  RR: 18 (11 May 2022 13:30) (18 - 18)  SpO2: 96% (11 May 2022 08:17) (95% - 96%)  CAPILLARY BLOOD GLUCOSE        LABS:                        15.5   9.95  )-----------( 238      ( 11 May 2022 07:12 )             45.9     05-11    135  |  94<L>  |  14  ----------------------------<  237<H>  4.1   |  26  |  1.1    Ca    9.8      11 May 2022 14:39  Phos  4.8     05-11  Mg     2.0     05-11    TPro  7.1  /  Alb  4.6  /  TBili  0.4  /  DBili  x   /  AST  16  /  ALT  16  /  AlkPhos  88  05-11    LIVER FUNCTIONS - ( 11 May 2022 14:39 )  Alb: 4.6 g/dL / Pro: 7.1 g/dL / ALK PHOS: 88 U/L / ALT: 16 U/L / AST: 16 U/L / GGT: x           CARDIAC MARKERS ( 11 May 2022 12:21 )  x     / <0.01 ng/mL / x     / x     / x                      Consultant Notes Reviewed:  [x ] YES  [ ] NO  Care Discussed with Consultants/Other Providers/ Housestaff [ x] YES  [ ] NO  Radiology, labs, new studies personally reviewed.

## 2022-05-11 NOTE — PROGRESS NOTE ADULT - ASSESSMENT
51 year old woman with PMH of SLE  lupus with lupus anticoagulant, antiphospholipid syndrome,  rheumatoid arthritis, cervical cancer sp hysterectomy,  laryngeal cancer sp CT, RT, CHF s/p AICD+PPM, DVT/PE in 2015 on Fondaparinux, NSTEMI in 2015, subclavian stenosis s/p stent placement, HTN, HLD, PPM/AICD presented to the hospital for a wound on the calf of the left lower extremity. pt has had this wound since last week with swelling of the calf, erythema, fever chills. She's been taking Augmentin for the last 6 days but has been worsening with black eschar. pt went to Dr Guan who performed a duplex, was negative    A/P:   #Left lower lesion w/ suspected cellulitis   -Blood cx no growth  -Arterial Duplex was normal.   -No fever  -Burn evaluated   - rheum c/s appreciated  - f/u rheum labs (e.g. C3, C4, etc.)   - the erythema resolved  - ID recommended to d/c ABx  < from: CT Angio Lower Extremity w/ IV Cont, Left (05.09.22 @ 22:59) >  Subcutaneous fat stranding and skin thickening in posteromedial calf area, which may reflect developing cellulitis.  No encapsulated or organized fluid collection.  < end of copied text >  -suspect the fat stranding due to possible engorged lymphatics   -d/w vasc: no further inpt workup  -advised pt to elevate LLE and use warm compresses    #History of VTE/ PE on fondaparinux  - continue Fondaparinux for now.   -reports resolution of LUE edema    #History of long-QT syndrome and VT sp AICD  #HFpEF  - EF 63% 2019  - s/p AICD placement (s/p laser lead extraction of ICD leads and device and reimplanted with new RA/RV lead and new ICD device ([employer]tronic) in 2021)    #SLE/RA  #APLAS  - not in flare  - on Fondaparinux  -rheum f/u    #COPD  - inhalers and nebs for COPD   -counselled pt to avoid smoking and secondary exposure    #Chronic pain syndrome  -I-Stop checked and meds ordered    #anxiety  -Xanax PRN    Dispo: pt is medically stable for discharge. Made pt aware that the remaining swelling in LLE can be followed as outpt w/ PMD, vascular and should resolve. Pt is no longer meeting criteria for inpt hospitalization. Pt is aware that she needs to appeal the discharge if she refuses to leave. Pt states that her hematologist Dr. Terry told her to stay and to refuse the discharge if she does not want to go home. I spoke to Dr. Terry and he is not recommending any further studies. Case mgmt is aware that pt is planning to appeal.    My note supersedes the residents note should a discrepancy arise.    Chart and notes personally reviewed.  Care Discussed with Consultants/Other Providers/ Housestaff [ x] YES [ ] NO   Radiology, labs, old records personally reviewed.    discussed w/ housestaff, nursing, case management, vascular Dr. Guan, ID, heme Dr. Terry     Risk Statement (NON-critical care).     On this date of service, level of risk to patient is considered: High.     Time-based billing (NON-critical care).     60 minutes spent on total encounter; more than 50% of the visit was spent counseling and / or coordinating care by the attending physician.  The necessity of the time spent during the encounter on this date of service was due to:     time spent on review of labs, imaging studies, old records, obtaining history, personally examining patient, counselling and communicating with patient/ family, entering orders for medications/tests/etc, discussions with other health care providers, documentation in electronic health records, independent interpretation of labs, imaging/procedure results and care coordination. 51 year old woman with PMH of SLE  lupus with lupus anticoagulant, antiphospholipid syndrome,  rheumatoid arthritis, cervical cancer sp hysterectomy,  laryngeal cancer sp CT, RT, CHF s/p AICD+PPM, DVT/PE in 2015 on Fondaparinux, NSTEMI in 2015, subclavian stenosis s/p stent placement, HTN, HLD, PPM/AICD presented to the hospital for a wound on the calf of the left lower extremity. pt has had this wound since last week with swelling of the calf, erythema, fever chills. She's been taking Augmentin for the last 6 days but has been worsening with black eschar. pt went to Dr Guan who performed a duplex, was negative    A/P:   #Left lower lesion w/ suspected cellulitis   -Blood cx no growth  -Arterial Duplex was normal.   -No fever  -Burn evaluated   - rheum c/s appreciated  - f/u rheum labs (e.g. C3, C4, etc.)   - the erythema resolved  - ID recommended to d/c ABx  < from: CT Angio Lower Extremity w/ IV Cont, Left (05.09.22 @ 22:59) >  Subcutaneous fat stranding and skin thickening in posteromedial calf area, which may reflect developing cellulitis.  No encapsulated or organized fluid collection.  < end of copied text >  -suspect the fat stranding due to possible engorged lymphatics   -d/w vasc: no further inpt workup  -advised pt to elevate LLE and use warm compresses    #History of VTE/ PE on fondaparinux  - continue Fondaparinux for now.   -reports resolution of LUE edema    #History of long-QT syndrome and VT sp AICD  #HFpEF  - EF 63% 2019  - s/p AICD placement (s/p laser lead extraction of ICD leads and device and reimplanted with new RA/RV lead and new ICD device ([employer]tronic) in 2021)    #SLE/RA  #APLAS  - not in flare  - on Fondaparinux  -rheum f/u    #COPD  - inhalers and nebs for COPD   -counselled pt to avoid smoking and secondary exposure  - CXR w/out infiltrates (my read)    #Chronic pain syndrome  -I-Stop checked and meds ordered    #anxiety  -Xanax PRN    Dispo: pt is medically stable for discharge. Made pt aware that the remaining swelling in LLE can be followed as outpt w/ PMD, vascular and should resolve. Pt is no longer meeting criteria for inpt hospitalization. Pt is aware that she needs to appeal the discharge if she refuses to leave. Pt states that her hematologist Dr. Terry told her to stay and to refuse the discharge if she does not want to go home. I spoke to Dr. Terry and he is not recommending any further studies. Case mgmt is aware that pt is planning to appeal.    Discharge instructions discussed and patient knows when to seek immediate medical attention.  Patient has proper follow up.  All results discussed and patient aware they require further follow up/work up.  Stressed importance of proper follow up.  Medications prescribed and changes discussed.  All questions and concerns from patient and family addressed. Understanding of instructions verbalized.    My note supersedes the residents note should a discrepancy arise.    Chart and notes personally reviewed.  Care Discussed with Consultants/Other Providers/ Housestaff [ x] YES [ ] NO   Radiology, labs, old records personally reviewed.    discussed w/ housestaff, nursing, case management, vascular Dr. Guan, ID, heme Dr. Terry     Risk Statement (NON-critical care).     On this date of service, level of risk to patient is considered: APLAS, multiple medical problems    Time-based billing (NON-critical care).     60 minutes spent on total encounter; more than 50% of the visit was spent counseling and / or coordinating care by the attending physician.  The necessity of the time spent during the encounter on this date of service was due to:     time spent on review of labs, imaging studies, old records, obtaining history, personally examining patient, counselling and communicating with patient/ family, entering orders for medications/tests/etc, discussions with other health care providers, documentation in electronic health records, independent interpretation of labs, imaging/procedure results and care coordination.

## 2022-05-11 NOTE — PROGRESS NOTE ADULT - SUBJECTIVE AND OBJECTIVE BOX
ILEANA MCNEIL  51y, Female  Allergy: adhesives (Rash; Urticaria)  Arava (Anaphylaxis; Angioedema)  Avelox (Other)  Plaquenil (Other)  Vantin (Other (Mild))      LOS  6d    CHIEF COMPLAINT: left lower extremity wound (11 May 2022 09:48)      INTERVAL EVENTS/HPI  - No acute events overnight  - T(F): , Max: 98 (05-11-22 @ 04:46)  - Denies any worsening symptoms  - Tolerating medication  - WBC Count: 9.95 (05-11-22 @ 07:12)  WBC Count: 6.09 (05-10-22 @ 08:23)     - Creatinine, Serum: 1.0 (05-11-22 @ 07:12)  Creatinine, Serum: 0.9 (05-10-22 @ 08:23)       ROS  General: Denies rigors, nightsweats  HEENT: Denies headache, rhinorrhea, sore throat, eye pain  CV: Denies CP, palpitations  PULM: Denies wheezing, hemoptysis  GI: Denies hematemesis, hematochezia, melena  : Denies discharge, hematuria  MSK: Denies arthralgias, myalgias  SKIN: Denies rash, lesions  NEURO: Denies paresthesias, weakness  PSYCH: Denies depression, anxiety    VITALS:  T(F): 97.2, Max: 98 (05-11-22 @ 04:46)  HR: 74  BP: 139/71  RR: 18Vital Signs Last 24 Hrs  T(C): 36.2 (11 May 2022 13:30), Max: 36.7 (11 May 2022 04:46)  T(F): 97.2 (11 May 2022 13:30), Max: 98 (11 May 2022 04:46)  HR: 74 (11 May 2022 13:30) (61 - 74)  BP: 139/71 (11 May 2022 13:30) (97/51 - 139/71)  BP(mean): 94 (10 May 2022 18:22) (94 - 94)  RR: 18 (11 May 2022 13:30) (18 - 18)  SpO2: 96% (11 May 2022 08:17) (95% - 97%)    PHYSICAL EXAM:  Gen: NAD, resting in bed  HEENT: Normocephalic, atraumatic  Neck: supple, no lymphadenopathy  CV: Regular rate & regular rhythm  Lungs: decreased BS at bases, no fremitus  Abdomen: Soft, BS present  Ext: Warm, well perfused, calf with ecchymoses, palpable cords  Neuro: non focal, awake  Skin: no rash, no erythema  Lines: no phlebitis    FH: Non-contributory  Social Hx: Non-contributory    TESTS & MEASUREMENTS:                        15.5   9.95  )-----------( 238      ( 11 May 2022 07:12 )             45.9     05-11    134<L>  |  96<L>  |  15  ----------------------------<  129<H>  6.2<HH>   |  22  |  1.0    Ca    10.4<H>      11 May 2022 07:12  Phos  4.8     05-11  Mg     2.0     05-11    TPro  7.6  /  Alb  4.7  /  TBili  0.5  /  DBili  x   /  AST  22  /  ALT  18  /  AlkPhos  88  05-11      LIVER FUNCTIONS - ( 11 May 2022 07:12 )  Alb: 4.7 g/dL / Pro: 7.6 g/dL / ALK PHOS: 88 U/L / ALT: 18 U/L / AST: 22 U/L / GGT: x               Culture - Urine (collected 05-08-22 @ 10:25)  Source: Clean Catch Clean Catch (Midstream)  Final Report (05-09-22 @ 14:29):    No growth    Culture - Blood (collected 05-06-22 @ 07:53)  Source: .Blood Blood  Preliminary Report (05-07-22 @ 15:06):    No growth to date.    Culture - Blood (collected 05-05-22 @ 17:13)  Source: .Blood Blood  Final Report (05-10-22 @ 23:00):    No Growth Final    Culture - Blood (collected 05-05-22 @ 17:13)  Source: .Blood Blood  Final Report (05-10-22 @ 23:00):    No Growth Final            INFECTIOUS DISEASES TESTING  MRSA PCR Result.: Negative (05-10-22 @ 16:20)  COVID-19 PCR: NotDetec (05-05-22 @ 18:42)  COVID-19 PCR: NotDetec (03-23-22 @ 20:23)      INFLAMMATORY MARKERS  C-Reactive Protein, Serum: 12 mg/L (05-10-22 @ 08:23)  Sedimentation Rate, Erythrocyte: 19 mm/Hr (05-10-22 @ 08:23)  Sedimentation Rate, Erythrocyte: 11 mm/Hr (05-06-22 @ 07:53)  C-Reactive Protein, Serum: 14 mg/L (05-06-22 @ 07:53)      RADIOLOGY & ADDITIONAL TESTS:  I have personally reviewed the last available Chest xray  CXR      CT      CARDIOLOGY TESTING  12 Lead ECG:   Ventricular Rate 72 BPM    Atrial Rate 72 BPM    P-R Interval 234 ms    QRS Duration 78 ms    Q-T Interval 392 ms    QTC Calculation(Bazett) 429 ms    P Axis 83 degrees    R Axis 35 degrees    T Axis 22 degrees    Diagnosis Line Atrial-paced rhythmwith prolonged AV conduction  Abnormal ECG    Confirmed by ABHIJIT PANG, [facility] ([id number]) on 5/11/2022 11:16:39 AM (05-11-22 @ 09:58)      MEDICATIONS  albuterol/ipratropium for Nebulization 3 Nebulizer every 6 hours  aspirin enteric coated 81 Oral daily  budesonide 160 MICROgram(s)/formoterol 4.5 MICROgram(s) Inhaler 2 Inhalation two times a day  clindamycin IVPB     clindamycin IVPB 600 IV Intermittent every 8 hours  cyanocobalamin 1000 Oral daily  folic acid 1 Oral daily  fondaparinux Injectable 7.5 SubCutaneous daily  hydrALAZINE 50 Oral two times a day  metoprolol succinate  Oral daily  morphine ER Tablet 120 Oral <User Schedule>      WEIGHT  Weight (kg): 71.2 (05-05-22 @ 14:33)  Creatinine, Serum: 1.0 mg/dL (05-11-22 @ 07:12)      ANTIBIOTICS:  clindamycin IVPB      clindamycin IVPB 600 milliGRAM(s) IV Intermittent every 8 hours      All available historical records have been reviewed

## 2022-05-11 NOTE — PROGRESS NOTE ADULT - ASSESSMENT
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 pt has had this wound since last week with swelling of the calf, erythema, fever chills. She's been taking Augmentin for the last 6 days but has been worsening with black eschar.     IMPRESSION  #LLE lesions- overall lower suspicion for abscess, when opened- no purulence, only blood - rule out vasculitis, rule out fondaparinux-associated bullous hemorrhagic dermatosis    denies spider/tick bites    reports hx MRSA- MRSA PCR is NEGATIVE   < from: Xray Tibia + Fibula 2 Views, Left (05.05.22 @ 18:01) >No acute displaced fracture.  < from: [facility] Duplex Lower Ext Vein Scan, Bilat (05.10.22 @ 17:21) >  No evidence of deep venous thrombosis or superficial thrombophlebitis in the bilateral lower extremities.  #Sepsis ruled out on admission   #SLE  lupus with lupus anticoagulant, antiphospholipid syndrome,  rheumatoid arthritis  #HF s/p AICD+PPM  #Abx allergy:  Vantin (Other (Mild))- unknown  avelox- heart issues    Creatinine, Serum: 1.0 (05-06-22 @ 07:53)    Weight (kg): 71.2 (05-05-22 @ 14:33)    RECOMMENDATIONS  - Rule out fondaparinux-associated bullous hemorrhagic dermatosis  - D/C Clinda   - Rheum consult appreciated  - f/u CT read  - Appreciate Burn consult     If any questions, please call or send a message on [employer] Teams  Please continue to update ID with any pertinent new laboratory or radiographic findings  Spectra [phone]     ASSESSMENT  51 year old woman with PMH of SLE  lupus with lupus anticoagulant, antiphospholipid syndrome,  rheumatoid arthritis, cervical cancer sp hysterectomy,  laryngeal cancer sp CT, RT, CHF s/p AICD+PPM, DVT/PE in 2015 on Fondaparinux, NSTEMI in 2015, subclavian stenosis s/p stent placement, HTN, HLD, PPM/AICD presented to the hospital for a wound on the calf of the left lower extremity. pt has had this wound since last week with swelling of the calf, erythema, fever chills. She's been taking Augmentin for the last 6 days but has been worsening with black eschar.     IMPRESSION  #LLE lesions- overall lower suspicion for abscess, when opened- no purulence, only blood - rule out vasculitis, rule out fondaparinux-associated bullous hemorrhagic dermatosis or other pathology    denies spider/tick bites    reports hx MRSA- MRSA PCR is NEGATIVE   < from: Xray Tibia + Fibula 2 Views, Left (05.05.22 @ 18:01) >No acute displaced fracture.  < from: [facility] Duplex Lower Ext Vein Scan, Bilat (05.10.22 @ 17:21) >  No evidence of deep venous thrombosis or superficial thrombophlebitis in the bilateral lower extremities.  #Sepsis ruled out on admission   #SLE  lupus with lupus anticoagulant, antiphospholipid syndrome,  rheumatoid arthritis  #HF s/p AICD+PPM  #Abx allergy:  Vantin (Other (Mild))- unknown  avelox- heart issues    Creatinine, Serum: 1.0 (05-06-22 @ 07:53)    Weight (kg): 71.2 (05-05-22 @ 14:33)    RECOMMENDATIONS  - Rule out fondaparinux-associated bullous hemorrhagic dermatosis or other pathology  - D/C Clinda   - Rheum consult appreciated  - f/u CT read  - Appreciate Burn consult     If any questions, please call or send a message on [employer] Teams  Please continue to update ID with any pertinent new laboratory or radiographic findings  Spectra [phone]

## 2022-05-12 LAB
ANA PAT FLD IF-IMP: SIGNIFICANT CHANGE UP
ANA TITR SER: ABNORMAL
ANTI-RIBONUCLEAR PROTEIN: <0.2 AI — SIGNIFICANT CHANGE UP
DSDNA AB FLD-ACNC: <0.2 AI — SIGNIFICANT CHANGE UP
DSDNA AB SER-ACNC: <12 IU/ML — SIGNIFICANT CHANGE UP
ENA SS-A AB FLD IA-ACNC: <0.2 AI — SIGNIFICANT CHANGE UP

## 2022-05-12 PROCEDURE — 99233 SBSQ HOSP IP/OBS HIGH 50: CPT

## 2022-05-12 RX ADMIN — Medication 1 MILLIGRAM(S): at 11:47

## 2022-05-12 RX ADMIN — FONDAPARINUX SODIUM 7.5 MILLIGRAM(S): 2.5 INJECTION, SOLUTION SUBCUTANEOUS at 11:47

## 2022-05-12 RX ADMIN — MORPHINE SULFATE 120 MILLIGRAM(S): 50 CAPSULE, EXTENDED RELEASE ORAL at 05:52

## 2022-05-12 RX ADMIN — Medication 100 MILLIGRAM(S): at 06:01

## 2022-05-12 RX ADMIN — Medication 50 MILLIGRAM(S): at 18:27

## 2022-05-12 RX ADMIN — Medication 1 MILLIGRAM(S): at 18:41

## 2022-05-12 RX ADMIN — MORPHINE SULFATE 120 MILLIGRAM(S): 50 CAPSULE, EXTENDED RELEASE ORAL at 15:49

## 2022-05-12 RX ADMIN — PREGABALIN 1000 MICROGRAM(S): 225 CAPSULE ORAL at 11:47

## 2022-05-12 RX ADMIN — MORPHINE SULFATE 120 MILLIGRAM(S): 50 CAPSULE, EXTENDED RELEASE ORAL at 22:13

## 2022-05-12 RX ADMIN — Medication 81 MILLIGRAM(S): at 11:47

## 2022-05-12 NOTE — PROGRESS NOTE ADULT - ASSESSMENT
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 pt has had this wound since last week with swelling of the calf, erythema, fever chills. She's been taking Augmentin for the last 6 days but has been worsening with black eschar.     IMPRESSION  #LLE lesions- overall lower suspicion for abscess, when opened- no purulence, only blood -  rule out fondaparinux-associated bullous hemorrhagic dermatosis or other pathology/vasculitis    denies spider/tick bites    CT with some stranding    reports hx MRSA- MRSA PCR is NEGATIVE   < from: Xray Tibia + Fibula 2 Views, Left (05.05.22 @ 18:01) >No acute displaced fracture.  < from: [facility] Duplex Lower Ext Vein Scan, Bilat (05.10.22 @ 17:21) >  No evidence of deep venous thrombosis or superficial thrombophlebitis in the bilateral lower extremities.  #Sepsis ruled out on admission   #SLE  lupus with lupus anticoagulant, antiphospholipid syndrome,  rheumatoid arthritis  #HF s/p AICD+PPM  #Abx allergy:  Vantin (Other (Mild))- unknown  avelox- heart issues    Creatinine, Serum: 1.0 (05-06-22 @ 07:53)    Weight (kg): 71.2 (05-05-22 @ 14:33)    RECOMMENDATIONS  - Overall high suspicion for fondaparinux-associated bullous hemorrhagic dermatosis or other non-infectious pathology. This all started after she started the drug. Possible she had a superinfection of the leg lesion, now improved s/p ABX  - Burn for biopsy RUE lesion  - Derm consult  - Discussed with Heme ?change A/C< very limited   - Rheum consult appreciated  - Appreciate Burn consult     If any questions, please call or send a message on [employer] Teams  Please continue to update ID with any pertinent new laboratory or radiographic findings  Spectra [phone]

## 2022-05-12 NOTE — PROGRESS NOTE ADULT - ASSESSMENT
51 year old woman with PMH of SLE  lupus with lupus anticoagulant, antiphospholipid syndrome,  rheumatoid arthritis, cervical cancer sp hysterectomy,  laryngeal cancer sp CT, RT, CHF s/p AICD+PPM, DVT/PE in 2015 on Fondaparinux, NSTEMI in 2015, subclavian stenosis s/p stent placement, HTN, HLD, PPM/AICD presented to the hospital for a wound on the calf of the left lower extremity. pt has had this wound since last week with swelling of the calf, erythema, fever chills. She's been taking Augmentin for the last 6 days but has been worsening with black eschar. pt went to Dr Guan who performed a duplex, was negative    A/P:   #Left lower extremity ?pustule/bullae w/ ?superimposed cellulitis   #RUE ?hemorrhagic dermatitis due to ?Arixtra  -Blood cx no growth  -Arterial Duplex was normal.   -No fever  -Burn evaluated   - rheum c/s appreciated  - f/u rheum labs (e.g. C3, C4, etc.)   - the erythema resolved  - ID recommended to d/c ABx  < from: CT Angio Lower Extremity w/ IV Cont, Left (05.09.22 @ 22:59) >  Subcutaneous fat stranding and skin thickening in posteromedial calf area, which may reflect developing cellulitis.  No encapsulated or organized fluid collection.  < end of copied text >  -suspect the fat stranding due to possible engorged lymphatics   -d/w vasc: no further inpt workup  -advised pt to elevate LLE and use warm compresses  -5/12: new RUE ?hemorrhagic dermatitis due to ?Arixtra. D/w ID and heme. Pt wants to be switched to Heparin SQ injections. Derm and burn c/s for Bx.    #History of VTE/ PE on fondaparinux  - A/c as above   -reports resolution of LUE edema    #History of long-QT syndrome and VT sp AICD  #HFpEF  - EF 63% 2019  - s/p AICD placement (s/p laser lead extraction of ICD leads and device and reimplanted with new RA/RV lead and new ICD device (Medtronic) in 2021)    #SLE/RA  #APLAS  - not in flare  - on Fondaparinux  -rheum f/u    #COPD  - inhalers and nebs for COPD   -counselled pt to avoid smoking and secondary exposure  - CXR w/out infiltrates (my read)    #Chronic pain syndrome  -I-Stop checked and meds ordered    #anxiety  -Xanax PRN    Dispo: once cleared by heme and burn/derm    My note supersedes the residents note should a discrepancy arise.    Chart and notes personally reviewed.  Care Discussed with Consultants/Other Providers/ Housestaff [ x] YES [ ] NO   Radiology, labs, old records personally reviewed.    discussed w/ housestaff, nursing, case management, ID, heme Dr. Terry,      Risk Statement (NON-critical care).     On this date of service, level of risk to patient is considered: APLAS, multiple medical problems, new hemorrhagic bullae on anticoagulation    Time-based billing (NON-critical care).     40 minutes spent on total encounter; more than 50% of the visit was spent counseling and / or coordinating care by the attending physician.  The necessity of the time spent during the encounter on this date of service was due to:     time spent on review of labs, imaging studies, old records, obtaining history, personally examining patient, counselling and communicating with patient/ family, entering orders for medications/tests/etc, discussions with other health care providers, documentation in electronic health records, independent interpretation of labs, imaging/procedure results and care coordination.

## 2022-05-12 NOTE — PROGRESS NOTE ADULT - SUBJECTIVE AND OBJECTIVE BOX
Patient is a 51y old  Female who presents with a chief complaint of left lower extremity wound (09 May 2022 13:37)    INTERVAL HPI/OVERNIGHT EVENTS: Patient was examined and seen at bedside. This morning pt is resting comfortably in bed. Says that she wants to go home after IV Clinda.    All other systems reviewed and are within normal limits.  InitialHPI:  51 year old woman with PMH of SLE  lupus with lupus anticoagulant, antiphospholipid syndrome,  rheumatoid arthritis, cervical cancer sp hysterectomy,  laryngeal cancer sp CT, RT, CHF s/p AICD+PPM, DVT/PE in 2015 on Fondaparinux, NSTEMI in 2015, subclavian stenosis s/p stent placement, HTN, HLD, PPM/AICD presented to the hospital for a wound on the calf of the left lower extremity. pt has had this wound since last week with swelling of the calf, erythema, fever chills. She's been taking Augmentin for the last 6 days but has been worsening with black eschar. pt went to Dr Guan who performed a duplex, was negative  in the ED,pt's VS T(C): 36.8 (05-05-22 @ 14:33), Max: 36.8 (05-05-22 @ 14:33)  HR: 87 (05-05-22 @ 15:58) (87 - 114)  BP: 138/67 (05-05-22 @ 15:58) (138/67 - 142/91)  RR: 18 (05-05-22 @ 14:33) (18 - 18)  SpO2: 96% (05-05-22 @ 14:33) (96% - 96%), labs done were unremarkable. Duplex done as outpatient was negative.   pt received aztreonam, metronidazole, Vancomycin.   pt is admitted for workup/management (05 May 2022 20:56)    PAST MEDICAL & SURGICAL HISTORY:  Lupus    RA (rheumatoid arthritis)    HTN (hypertension)    CHF (congestive heart failure)    COPD (chronic obstructive pulmonary disease)    DVT (deep venous thrombosis)    Pulmonary embolism    History of laryngeal cancer    GERD (gastroesophageal reflux disease)    Cervical cancer    S/P appendectomy    S/P cholecystectomy    AICD (automatic cardioverter/defibrillator) present    S/P hysterectomy    History of back surgery    H/O foot surgery    S/P eye surgery    Subclavian arterial stenosis        General: NAD, AAO3  HEENT:  EOMI, no LAD  CV: S1 S2  Resp: decreased breath sounds at bases  GI: NT/ND/S +BS  MS: Lt calf decreased swelling. No erythema; initial pustule/bullae resolved; post calf +echymoses, small area of induration. New RUE bullar around 10mm w/ a few smaller bullae  Neuro: nonfocal, +reflexes thruout            MEDICATIONS  (STANDING):  albuterol/ipratropium for Nebulization 3 milliLiter(s) Nebulizer every 6 hours  aspirin enteric coated 81 milliGRAM(s) Oral daily  budesonide 160 MICROgram(s)/formoterol 4.5 MICROgram(s) Inhaler 2 Puff(s) Inhalation two times a day  cyanocobalamin 1000 MICROGram(s) Oral daily  folic acid 1 milliGRAM(s) Oral daily  fondaparinux Injectable 7.5 milliGRAM(s) SubCutaneous daily  hydrALAZINE 50 milliGRAM(s) Oral two times a day  metoprolol succinate  milliGRAM(s) Oral daily  morphine ER Tablet 120 milliGRAM(s) Oral <User Schedule>    MEDICATIONS  (PRN):  acetaminophen     Tablet .. 650 milliGRAM(s) Oral every 6 hours PRN Temp greater or equal to 38C (100.4F), Mild Pain (1 - 3)  ALPRAZolam 1 milliGRAM(s) Oral every 6 hours PRN anxiety  aluminum hydroxide/magnesium hydroxide/simethicone Suspension 30 milliLiter(s) Oral every 4 hours PRN Dyspepsia  melatonin 3 milliGRAM(s) Oral at bedtime PRN Insomnia  ondansetron    Tablet 4 milliGRAM(s) Oral every 8 hours PRN Nausea and/or Vomiting  simethicone 80 milliGRAM(s) Chew two times a day PRN Indigestion    Home Medications:  albuterol 90 mcg/inh inhalation aerosol: 2 puff(s) inhaled every 6 hours, As Needed (11 May 2022 16:03)  ALPRAZolam 1 mg oral tablet: 2 tab(s) orally 3 times a day, As Needed (05 May 2022 21:12)  cyanocobalamin 1000 mcg/mL injectable solution: 1 milliliter(s) injectable once a week (05 May 2022 21:12)  folic acid 1 mg oral tablet: 1 tab(s) orally once a day (05 May 2022 21:12)  fondaparinux 7.5 mg/0.6 mL subcutaneous solution: 0.6 milliliter(s) subcutaneous once a day (05 May 2022 21:12)  hydrALAZINE 50 mg oral tablet: 1 tab(s) orally 2 times a day (05 May 2022 21:12)  Metoprolol Succinate  mg oral tablet, extended release: 1 tab(s) orally once a day (05 May 2022 21:12)  MS Contin 60 mg oral tablet, extended release: 2 tab(s) orally 3 times a day (after meals), As Needed (11 May 2022 16:03)  Trelegy Ellipta 100 mcg-62.5 mcg-25 mcg/inh inhalation powder: 1 puff(s) inhaled once a day (05 May 2022 21:12)    Vital Signs Last 24 Hrs  T(C): 36.2 (12 May 2022 05:01), Max: 36.2 (12 May 2022 05:01)  T(F): 97.1 (12 May 2022 05:01), Max: 97.1 (12 May 2022 05:01)  HR: 68 (12 May 2022 05:01) (68 - 83)  BP: 103/59 (12 May 2022 05:01) (103/59 - 112/56)  BP(mean): 78 (12 May 2022 05:01) (78 - 79)  RR: 18 (12 May 2022 05:01) (18 - 18)  SpO2: 95% (11 May 2022 17:09) (95% - 95%)  CAPILLARY BLOOD GLUCOSE        LABS:                        15.5   9.95  )-----------( 238      ( 11 May 2022 07:12 )             45.9     05-11    135  |  94<L>  |  14  ----------------------------<  237<H>  4.1   |  26  |  1.1    Ca    9.8      11 May 2022 14:39  Phos  4.8     05-11  Mg     2.0     05-11    TPro  7.1  /  Alb  4.6  /  TBili  0.4  /  DBili  x   /  AST  16  /  ALT  16  /  AlkPhos  88  05-11    LIVER FUNCTIONS - ( 11 May 2022 14:39 )  Alb: 4.6 g/dL / Pro: 7.1 g/dL / ALK PHOS: 88 U/L / ALT: 16 U/L / AST: 16 U/L / GGT: x           CARDIAC MARKERS ( 11 May 2022 12:21 )  x     / <0.01 ng/mL / x     / x     / x                      Consultant Notes Reviewed:  [x ] YES  [ ] NO  Care Discussed with Consultants/Other Providers/ Housestaff [ x] YES  [ ] NO  Radiology, labs, new studies personally reviewed.

## 2022-05-12 NOTE — PROGRESS NOTE ADULT - SUBJECTIVE AND OBJECTIVE BOX
ILEANA MCNEIL  51y, Female  Allergy: adhesives (Rash; Urticaria)  Arava (Anaphylaxis; Angioedema)  Avelox (Other)  Plaquenil (Other)  Vantin (Other (Mild))      LOS  7d    CHIEF COMPLAINT: left lower extremity wound (12 May 2022 06:02)      INTERVAL EVENTS/HPI  - No acute events overnight, new lesions RUE  - T(F): , Max: 97.1 (05-12-22 @ 05:01)  - Denies any worsening symptoms  - Tolerating medication  - WBC Count: 9.95 (05-11-22 @ 07:12)  WBC Count: 6.09 (05-10-22 @ 08:23)     - Creatinine, Serum: 1.1 (05-11-22 @ 14:39)  Creatinine, Serum: 1.0 (05-11-22 @ 07:12)       ROS  General: Denies rigors, nightsweats  HEENT: Denies headache, rhinorrhea, sore throat, eye pain  CV: Denies CP, palpitations  PULM: Denies wheezing, hemoptysis  GI: Denies hematemesis, hematochezia, melena  : Denies discharge, hematuria  MSK: Denies arthralgias, myalgias  SKIN: Denies rash, lesions  NEURO: Denies paresthesias, weakness  PSYCH: Denies depression, anxiety    VITALS:  T(F): 97.1, Max: 97.1 (05-12-22 @ 05:01)  HR: 68  BP: 103/59  RR: 18Vital Signs Last 24 Hrs  T(C): 36.2 (12 May 2022 05:01), Max: 36.2 (12 May 2022 05:01)  T(F): 97.1 (12 May 2022 05:01), Max: 97.1 (12 May 2022 05:01)  HR: 68 (12 May 2022 05:01) (68 - 83)  BP: 103/59 (12 May 2022 05:01) (103/59 - 112/56)  BP(mean): 78 (12 May 2022 05:01) (78 - 79)  RR: 18 (12 May 2022 05:01) (18 - 18)  SpO2: 95% (11 May 2022 17:09) (95% - 95%)    PHYSICAL EXAM:  Gen: NAD, resting in bed  HEENT: Normocephalic, atraumatic  Neck: supple, no lymphadenopathy  CV: Regular rate & regular rhythm  Lungs: decreased BS at bases, no fremitus  Abdomen: Soft, BS present  Ext: Warm, well perfused  Neuro: non focal, awake  Skin: RUE necrotic bullous lesion, smaller hemorrhagic lesions, LLE hard palpable area in calf  Lines: no phlebitis    FH: Non-contributory  Social Hx: Non-contributory    TESTS & MEASUREMENTS:                        15.5   9.95  )-----------( 238      ( 11 May 2022 07:12 )             45.9     05-11    135  |  94<L>  |  14  ----------------------------<  237<H>  4.1   |  26  |  1.1    Ca    9.8      11 May 2022 14:39  Phos  4.8     05-11  Mg     2.0     05-11    TPro  7.1  /  Alb  4.6  /  TBili  0.4  /  DBili  x   /  AST  16  /  ALT  16  /  AlkPhos  88  05-11      LIVER FUNCTIONS - ( 11 May 2022 14:39 )  Alb: 4.6 g/dL / Pro: 7.1 g/dL / ALK PHOS: 88 U/L / ALT: 16 U/L / AST: 16 U/L / GGT: x               Culture - Urine (collected 05-08-22 @ 10:25)  Source: Clean Catch Clean Catch (Midstream)  Final Report (05-09-22 @ 14:29):    No growth    Culture - Blood (collected 05-06-22 @ 07:53)  Source: .Blood Blood  Final Report (05-11-22 @ 15:05):    No Growth Final    Culture - Blood (collected 05-05-22 @ 17:13)  Source: .Blood Blood  Final Report (05-10-22 @ 23:00):    No Growth Final    Culture - Blood (collected 05-05-22 @ 17:13)  Source: .Blood Blood  Final Report (05-10-22 @ 23:00):    No Growth Final            INFECTIOUS DISEASES TESTING  MRSA PCR Result.: Negative (05-10-22 @ 16:20)  COVID-19 PCR: NotDetec (05-05-22 @ 18:42)  COVID-19 PCR: NotDetec (03-23-22 @ 20:23)      INFLAMMATORY MARKERS  C-Reactive Protein, Serum: 12 mg/L (05-10-22 @ 08:23)  Sedimentation Rate, Erythrocyte: 19 mm/Hr (05-10-22 @ 08:23)  Sedimentation Rate, Erythrocyte: 11 mm/Hr (05-06-22 @ 07:53)  C-Reactive Protein, Serum: 14 mg/L (05-06-22 @ 07:53)      RADIOLOGY & ADDITIONAL TESTS:  I have personally reviewed the last available Chest xray  CXR  Xray Chest 1 View- PORTABLE-Urgent:   ACC: [id number] EXAM:  XR CHEST PORTABLE URGENT 1V                          PROCEDURE DATE:  05/11/2022          INTERPRETATION:  Clinical History / Reason for exam: Shortness of breath.    Comparison : Chest radiograph 3/23/2022.    Technique/Positioning: Frontal radiograph of the chest.    Findings:    Support devices: Left chest wall AICD, stable. Left subclavian stent,   stable.    Cardiac/mediastinum/hilum: Unchanged.    Lung parenchyma/Pleura: No effusion, focal consolidation, or pneumothorax.    Skeleton/soft tissues: No acute osseous lesions.    Impression:    No effusion, focal consolidation, or pneumothorax.          --- End of Report ---          JEANNA FRANCO MD; Resident Radiologist  This document has been electronically signed.  MICHAEL BATISTA MD; Attending Radiologist  This document has been electronically signed. May 11 2022  3:52PM (05-11-22 @ 08:53)      CT      CARDIOLOGY TESTING  12 Lead ECG:   Ventricular Rate 72 BPM    Atrial Rate 72 BPM    P-R Interval 234 ms    QRS Duration 78 ms    Q-T Interval 392 ms    QTC Calculation(Bazett) 429 ms    P Axis 83 degrees    R Axis 35 degrees    T Axis 22 degrees    Diagnosis Line Atrial-paced rhythmwith prolonged AV conduction  Abnormal ECG    Confirmed by ABHIJIT PANG, [facility] ([id number]) on 5/11/2022 11:16:39 AM (05-11-22 @ 09:58)      MEDICATIONS  albuterol/ipratropium for Nebulization 3 Nebulizer every 6 hours  aspirin enteric coated 81 Oral daily  budesonide 160 MICROgram(s)/formoterol 4.5 MICROgram(s) Inhaler 2 Inhalation two times a day  cyanocobalamin 1000 Oral daily  folic acid 1 Oral daily  fondaparinux Injectable 7.5 SubCutaneous daily  hydrALAZINE 50 Oral two times a day  metoprolol succinate  Oral daily  morphine ER Tablet 120 Oral <User Schedule>      WEIGHT  Weight (kg): 71.2 (05-05-22 @ 14:33)  Creatinine, Serum: 1.1 mg/dL (05-11-22 @ 14:39)      ANTIBIOTICS:      All available historical records have been reviewed

## 2022-05-12 NOTE — PROGRESS NOTE ADULT - ASSESSMENT
51 year old woman with PMH of SLE  lupus with lupus anticoagulant, antiphospholipid syndrome,  rheumatoid arthritis, cervical cancer sp hysterectomy,  laryngeal cancer sp CT, RT, CHF s/p AICD+PPM, DVT/PE in 2015 on Fondaparinux, NSTEMI in 2015, subclavian stenosis s/p stent placement, HTN, HLD, PPM/AICD presented to the hospital for a wound on the calf of the left lower extremity. pt has had this wound since last week with swelling of the calf, erythema, fever chills. She's been taking Augmentin for the last 6 days but has been worsening with black eschar. pt went to Dr Guan who performed a duplex, was negative    A/P:   #Left lower lesion w/ suspected cellulitis   -Blood cx no growth  -Arterial Duplex was normal.   -No fever  -Burn evaluated   - rheum c/s appreciated  - f/u rheum labs (e.g. C3, C4, etc.)   - the erythema resolved  - ID recommended to d/c ABx  < from: CT Angio Lower Extremity w/ IV Cont, Left (05.09.22 @ 22:59) >  Subcutaneous fat stranding and skin thickening in posteromedial calf area, which may reflect developing cellulitis.  No encapsulated or organized fluid collection.  < end of copied text >  -suspect the fat stranding due to possible engorged lymphatics   -d/w vasc: no further inpt workup  -advised pt to elevate LLE and use warm compresses    #History of VTE/ PE on fondaparinux  - continue Fondaparinux for now.   -reports resolution of LUE edema    #History of long-QT syndrome and VT sp AICD  #HFpEF  - EF 63% 2019  - s/p AICD placement (s/p laser lead extraction of ICD leads and device and reimplanted with new RA/RV lead and new ICD device ([employer]tr[employer]) in 2021)    #SLE/RA  #APLAS  - not in flare  - on Fondaparinux  -rheum f/u    #COPD  - inhalers and nebs for COPD   -counselled pt to avoid smoking and secondary exposure  - CXR w/out infiltrates (my read)    #Chronic pain syndrome  -I-Stop checked and meds ordered    #anxiety  -Xanax PRN    Dispo:   -pt is medically stable for discharge. Made pt aware that the remaining swelling in LLE can be followed as outpt w/ PMD, vascular and should resolve. Pt is no longer meeting criteria for inpt hospitalization. Pt is aware that she needs to appeal the discharge if she refuses to leave. Pt states that her hematologist Dr. Terry told her to stay and to refuse the discharge if she does not want to go home.

## 2022-05-12 NOTE — PROGRESS NOTE ADULT - SUBJECTIVE AND OBJECTIVE BOX
Hospital Day:  7d    Subjective:    No acute events overnight.    Chart reviewed, patient seen and examined at bedside in AM. Patient appears comfortable while at rest in bed. No other complaints.     Denies headaches, changes in vision, chest pains, SOB, n/v/d, abd pain, constipation, changes in urination, pain on urination, weakness, fatigue, joint pain or muscle pain.       Past Medical Hx:   Lupus    AICD (automatic cardioverter/defibrillator) present    Throat cancer    RA (rheumatoid arthritis)    HTN (hypertension)    CHF (congestive heart failure)    COPD (chronic obstructive pulmonary disease)    DVT (deep venous thrombosis)    Pulmonary embolus    Pulmonary embolism    History of laryngeal cancer    GERD (gastroesophageal reflux disease)    Cervical cancer      Past Sx:  S/P appendectomy    S/P cholecystectomy    History of laryngeal cancer    AICD (automatic cardioverter/defibrillator) present    S/P hysterectomy    History of back surgery    H/O foot surgery    S/P eye surgery    Subclavian arterial stenosis      Allergies:  adhesives (Rash; Urticaria)  Arava (Anaphylaxis; Angioedema)  Avelox (Other)  Plaquenil (Other)  Vantin (Other (Mild))    Current Meds:   Standng Meds:  albuterol/ipratropium for Nebulization 3 milliLiter(s) Nebulizer every 6 hours  aspirin enteric coated 81 milliGRAM(s) Oral daily  budesonide 160 MICROgram(s)/formoterol 4.5 MICROgram(s) Inhaler 2 Puff(s) Inhalation two times a day  clindamycin IVPB      clindamycin IVPB 600 milliGRAM(s) IV Intermittent every 8 hours  cyanocobalamin 1000 MICROGram(s) Oral daily  folic acid 1 milliGRAM(s) Oral daily  fondaparinux Injectable 7.5 milliGRAM(s) SubCutaneous daily  hydrALAZINE 50 milliGRAM(s) Oral two times a day  metoprolol succinate  milliGRAM(s) Oral daily  morphine ER Tablet 120 milliGRAM(s) Oral <User Schedule>    PRN Meds:  acetaminophen     Tablet .. 650 milliGRAM(s) Oral every 6 hours PRN Temp greater or equal to 38C (100.4F), Mild Pain (1 - 3)  ALPRAZolam 1 milliGRAM(s) Oral every 6 hours PRN anxiety  aluminum hydroxide/magnesium hydroxide/simethicone Suspension 30 milliLiter(s) Oral every 4 hours PRN Dyspepsia  melatonin 3 milliGRAM(s) Oral at bedtime PRN Insomnia  ondansetron    Tablet 4 milliGRAM(s) Oral every 8 hours PRN Nausea and/or Vomiting  simethicone 80 milliGRAM(s) Chew two times a day PRN Indigestion    HOME MEDICATIONS:  albuterol 90 mcg/inh inhalation aerosol: 2 puff(s) inhaled every 6 hours, As Needed  ALPRAZolam 1 mg oral tablet: 2 tab(s) orally 3 times a day, As Needed  cyanocobalamin 1000 mcg/mL injectable solution: 1 milliliter(s) injectable once a week  folic acid 1 mg oral tablet: 1 tab(s) orally once a day  fondaparinux 7.5 mg/0.6 mL subcutaneous solution: 0.6 milliliter(s) subcutaneous once a day  hydrALAZINE 50 mg oral tablet: 1 tab(s) orally 2 times a day  Metoprolol Succinate  mg oral tablet, extended release: 1 tab(s) orally once a day  MS Contin 60 mg oral tablet, extended release: 2 tab(s) orally 3 times a day (after meals), As Needed  Trelegy Ellipta 100 mcg-62.5 mcg-25 mcg/inh inhalation powder: 1 puff(s) inhaled once a day      Vital Signs:   T(F): 97.1 (22 @ 05:01), Max: 97.2 (22 @ 13:30)  HR: 68 (22 @ 05:01) (65 - 83)  BP: 103/59 (22 @ 05:01) (103/59 - 139/71)  RR: 18 (22 @ 05:01) (18 - 18)  SpO2: 95% (22 @ 17:09) (95% - 96%)        Physical Exam:   CONSTITUTIONAL: Well-developed; well-nourished; in no acute distress, speaking in full sentences  HEAD: Normocephalic; atraumatic  EYES: PERRL, EOMI, no conjunctival erythema  NECK: Supple; non tender, FROM  CARD: +S1, S2 Regular rate and rhythm.  RESP: CTABL  ABD: soft nontender, nondistended, no rebound, no guarding, no rigidity  EXT: moves all extremities,  No clubbing, cyanosis or edema.   NEURO: Alert, oriented, grossly unremarkable, no focal deficits, cn ii-xii grossly intact  PSYCH: Cooperative, appropriate         Labs:                         15.5   9.95  )-----------( 238      ( 11 May 2022 07:12 )             45.9     Neutophil% 47.0, Lymphocyte% 40.0, Monocyte% 8.2, Bands% 0.3 22 @ 07:12    11 May 2022 14:39    135    |  94     |  14     ----------------------------<  237    4.1     |  26     |  1.1      Ca    9.8        11 May 2022 14:39  Phos  4.8       11 May 2022 07:12  Mg     2.0       11 May 2022 07:12    TPro  7.1    /  Alb  4.6    /  TBili  0.4    /  DBili  x      /  AST  16     /  ALT  16     /  AlkPhos  88     11 May 2022 14:39              Troponin <0.01, CKMB --, CK -- 22 @ 12:21        Urinalysis Basic - ( 08 May 2022 10:25 )    Color: Yellow / Appearance: Clear / S.028 / pH: x  Gluc: x / Ketone: Trace  / Bili: Negative / Urobili: <2 mg/dL   Blood: x / Protein: Trace / Nitrite: Negative   Leuk Esterase: Negative / RBC: 4 /HPF / WBC 2 /HPF   Sq Epi: x / Non Sq Epi: 13 /HPF / Bacteria: Negative          Culture - Blood (collected 22 @ 07:53)  Source: .Blood Blood  Final Report (22 @ 15:05):    No Growth Final    Culture - Blood (collected 22 @ 17:13)  Source: .Blood Blood  Final Report (05-10-22 @ 23:00):    No Growth Final    Culture - Blood (collected 22 @ 17:13)  Source: .Blood Blood  Final Report (05-10-22 @ 23:00):    No Growth Final        =============    CAPILLARY BLOOD GLUCOSE        CARDIAC MARKERS ( 11 May 2022 12:21 )  x     / <0.01 ng/mL / x     / x     / x          LIVER FUNCTIONS - ( 11 May 2022 14:39 )  Alb: 4.6 g/dL / Pro: 7.1 g/dL / ALK PHOS: 88 U/L / ALT: 16 U/L / AST: 16 U/L / GGT: x               Radiology:   IMAGING:             Hospital Day:  7d    Subjective:    No acute events overnight.    Chart reviewed, patient seen and examined at bedside in AM. Patient appears comfortable while at rest in bed. No other complaints. ROS negative.      Past Medical Hx:   Lupus    AICD (automatic cardioverter/defibrillator) present    Throat cancer    RA (rheumatoid arthritis)    HTN (hypertension)    CHF (congestive heart failure)    COPD (chronic obstructive pulmonary disease)    DVT (deep venous thrombosis)    Pulmonary embolus    Pulmonary embolism    History of laryngeal cancer    GERD (gastroesophageal reflux disease)    Cervical cancer      Past Sx:  S/P appendectomy    S/P cholecystectomy    History of laryngeal cancer    AICD (automatic cardioverter/defibrillator) present    S/P hysterectomy    History of back surgery    H/O foot surgery    S/P eye surgery    Subclavian arterial stenosis      Allergies:  adhesives (Rash; Urticaria)  Arava (Anaphylaxis; Angioedema)  Avelox (Other)  Plaquenil (Other)  Vantin (Other (Mild))    Current Meds:   Standng Meds:  albuterol/ipratropium for Nebulization 3 milliLiter(s) Nebulizer every 6 hours  aspirin enteric coated 81 milliGRAM(s) Oral daily  budesonide 160 MICROgram(s)/formoterol 4.5 MICROgram(s) Inhaler 2 Puff(s) Inhalation two times a day  clindamycin IVPB      clindamycin IVPB 600 milliGRAM(s) IV Intermittent every 8 hours  cyanocobalamin 1000 MICROGram(s) Oral daily  folic acid 1 milliGRAM(s) Oral daily  fondaparinux Injectable 7.5 milliGRAM(s) SubCutaneous daily  hydrALAZINE 50 milliGRAM(s) Oral two times a day  metoprolol succinate  milliGRAM(s) Oral daily  morphine ER Tablet 120 milliGRAM(s) Oral <User Schedule>    PRN Meds:  acetaminophen     Tablet .. 650 milliGRAM(s) Oral every 6 hours PRN Temp greater or equal to 38C (100.4F), Mild Pain (1 - 3)  ALPRAZolam 1 milliGRAM(s) Oral every 6 hours PRN anxiety  aluminum hydroxide/magnesium hydroxide/simethicone Suspension 30 milliLiter(s) Oral every 4 hours PRN Dyspepsia  melatonin 3 milliGRAM(s) Oral at bedtime PRN Insomnia  ondansetron    Tablet 4 milliGRAM(s) Oral every 8 hours PRN Nausea and/or Vomiting  simethicone 80 milliGRAM(s) Chew two times a day PRN Indigestion    HOME MEDICATIONS:  albuterol 90 mcg/inh inhalation aerosol: 2 puff(s) inhaled every 6 hours, As Needed  ALPRAZolam 1 mg oral tablet: 2 tab(s) orally 3 times a day, As Needed  cyanocobalamin 1000 mcg/mL injectable solution: 1 milliliter(s) injectable once a week  folic acid 1 mg oral tablet: 1 tab(s) orally once a day  fondaparinux 7.5 mg/0.6 mL subcutaneous solution: 0.6 milliliter(s) subcutaneous once a day  hydrALAZINE 50 mg oral tablet: 1 tab(s) orally 2 times a day  Metoprolol Succinate  mg oral tablet, extended release: 1 tab(s) orally once a day  MS Contin 60 mg oral tablet, extended release: 2 tab(s) orally 3 times a day (after meals), As Needed  Trelegy Ellipta 100 mcg-62.5 mcg-25 mcg/inh inhalation powder: 1 puff(s) inhaled once a day      Vital Signs:   T(F): 97.1 (22 @ 05:01), Max: 97.2 (22 @ 13:30)  HR: 68 (22 @ 05:01) (65 - 83)  BP: 103/59 (22 @ 05:01) (103/59 - 139/71)  RR: 18 (22 @ 05:01) (18 - 18)  SpO2: 95% (22 @ 17:09) (95% - 96%)        Physical Exam:   CONSTITUTIONAL: Well-developed; well-nourished; in no acute distress, speaking in full sentences  HEAD: Normocephalic; atraumatic  EYES: PERRL, EOMI, no conjunctival erythema  NECK: Supple; non tender, FROM  CARD: +S1, S2 Regular rate and rhythm.  RESP: CTABL  ABD: soft nontender, nondistended, no rebound, no guarding, no rigidity  EXT: moves all extremities,  No clubbing, cyanosis or edema.   NEURO: Alert, oriented, grossly unremarkable, no focal deficits, cn ii-xii grossly intact  PSYCH: Cooperative, appropriate         Labs:                         15.5   9.95  )-----------( 238      ( 11 May 2022 07:12 )             45.9     Neutophil% 47.0, Lymphocyte% 40.0, Monocyte% 8.2, Bands% 0.3 22 @ 07:12    11 May 2022 14:39    135    |  94     |  14     ----------------------------<  237    4.1     |  26     |  1.1      Ca    9.8        11 May 2022 14:39  Phos  4.8       11 May 2022 07:12  Mg     2.0       11 May 2022 07:12    TPro  7.1    /  Alb  4.6    /  TBili  0.4    /  DBili  x      /  AST  16     /  ALT  16     /  AlkPhos  88     11 May 2022 14:39              Troponin <0.01, CKMB --, CK -- 22 @ 12:21        Urinalysis Basic - ( 08 May 2022 10:25 )    Color: Yellow / Appearance: Clear / S.028 / pH: x  Gluc: x / Ketone: Trace  / Bili: Negative / Urobili: <2 mg/dL   Blood: x / Protein: Trace / Nitrite: Negative   Leuk Esterase: Negative / RBC: 4 /HPF / WBC 2 /HPF   Sq Epi: x / Non Sq Epi: 13 /HPF / Bacteria: Negative          Culture - Blood (collected 22 @ 07:53)  Source: .Blood Blood  Final Report (22 @ 15:05):    No Growth Final    Culture - Blood (collected 22 @ 17:13)  Source: .Blood Blood  Final Report (05-10-22 @ 23:00):    No Growth Final    Culture - Blood (collected 22 @ 17:13)  Source: .Blood Blood  Final Report (05-10-22 @ 23:00):    No Growth Final        =============    CAPILLARY BLOOD GLUCOSE        CARDIAC MARKERS ( 11 May 2022 12:21 )  x     / <0.01 ng/mL / x     / x     / x          LIVER FUNCTIONS - ( 11 May 2022 14:39 )  Alb: 4.6 g/dL / Pro: 7.1 g/dL / ALK PHOS: 88 U/L / ALT: 16 U/L / AST: 16 U/L / GGT: x               Radiology:   IMAGING:             Hospital Day:  7d    Subjective:    No acute events overnight.    Chart reviewed, patient seen and examined at bedside in AM. Patient appears comfortable while at rest in bed. No other complaints. ROS negative.    Antibiotics discontinued. No further Antibiotics as per attending.      Past Medical Hx:   Lupus    AICD (automatic cardioverter/defibrillator) present    Throat cancer    RA (rheumatoid arthritis)    HTN (hypertension)    CHF (congestive heart failure)    COPD (chronic obstructive pulmonary disease)    DVT (deep venous thrombosis)    Pulmonary embolus    Pulmonary embolism    History of laryngeal cancer    GERD (gastroesophageal reflux disease)    Cervical cancer      Past Sx:  S/P appendectomy    S/P cholecystectomy    History of laryngeal cancer    AICD (automatic cardioverter/defibrillator) present    S/P hysterectomy    History of back surgery    H/O foot surgery    S/P eye surgery    Subclavian arterial stenosis      Allergies:  adhesives (Rash; Urticaria)  Arava (Anaphylaxis; Angioedema)  Avelox (Other)  Plaquenil (Other)  Vantin (Other (Mild))    Current Meds:   Standng Meds:  albuterol/ipratropium for Nebulization 3 milliLiter(s) Nebulizer every 6 hours  aspirin enteric coated 81 milliGRAM(s) Oral daily  budesonide 160 MICROgram(s)/formoterol 4.5 MICROgram(s) Inhaler 2 Puff(s) Inhalation two times a day  clindamycin IVPB      clindamycin IVPB 600 milliGRAM(s) IV Intermittent every 8 hours  cyanocobalamin 1000 MICROGram(s) Oral daily  folic acid 1 milliGRAM(s) Oral daily  fondaparinux Injectable 7.5 milliGRAM(s) SubCutaneous daily  hydrALAZINE 50 milliGRAM(s) Oral two times a day  metoprolol succinate  milliGRAM(s) Oral daily  morphine ER Tablet 120 milliGRAM(s) Oral <User Schedule>    PRN Meds:  acetaminophen     Tablet .. 650 milliGRAM(s) Oral every 6 hours PRN Temp greater or equal to 38C (100.4F), Mild Pain (1 - 3)  ALPRAZolam 1 milliGRAM(s) Oral every 6 hours PRN anxiety  aluminum hydroxide/magnesium hydroxide/simethicone Suspension 30 milliLiter(s) Oral every 4 hours PRN Dyspepsia  melatonin 3 milliGRAM(s) Oral at bedtime PRN Insomnia  ondansetron    Tablet 4 milliGRAM(s) Oral every 8 hours PRN Nausea and/or Vomiting  simethicone 80 milliGRAM(s) Chew two times a day PRN Indigestion    HOME MEDICATIONS:  albuterol 90 mcg/inh inhalation aerosol: 2 puff(s) inhaled every 6 hours, As Needed  ALPRAZolam 1 mg oral tablet: 2 tab(s) orally 3 times a day, As Needed  cyanocobalamin 1000 mcg/mL injectable solution: 1 milliliter(s) injectable once a week  folic acid 1 mg oral tablet: 1 tab(s) orally once a day  fondaparinux 7.5 mg/0.6 mL subcutaneous solution: 0.6 milliliter(s) subcutaneous once a day  hydrALAZINE 50 mg oral tablet: 1 tab(s) orally 2 times a day  Metoprolol Succinate  mg oral tablet, extended release: 1 tab(s) orally once a day  MS Contin 60 mg oral tablet, extended release: 2 tab(s) orally 3 times a day (after meals), As Needed  Trelegy Ellipta 100 mcg-62.5 mcg-25 mcg/inh inhalation powder: 1 puff(s) inhaled once a day      Vital Signs:   T(F): 97.1 (22 @ 05:01), Max: 97.2 (22 @ 13:30)  HR: 68 (22 @ 05:01) (65 - 83)  BP: 103/59 (22 @ 05:01) (103/59 - 139/71)  RR: 18 (22 @ 05:01) (18 - 18)  SpO2: 95% (22 @ 17:09) (95% - 96%)        Physical Exam:   CONSTITUTIONAL: Well-developed; well-nourished; in no acute distress, speaking in full sentences  HEAD: Normocephalic; atraumatic  EYES: PERRL, EOMI, no conjunctival erythema  NECK: Supple; non tender, FROM  CARD: +S1, S2 Regular rate and rhythm.  RESP: CTABL  ABD: soft nontender, nondistended, no rebound, no guarding, no rigidity  EXT: moves all extremities,  No clubbing, cyanosis or edema.   NEURO: Alert, oriented, grossly unremarkable, no focal deficits, cn ii-xii grossly intact  PSYCH: Cooperative, appropriate         Labs:                         15.5   9.95  )-----------( 238      ( 11 May 2022 07:12 )             45.9     Neutophil% 47.0, Lymphocyte% 40.0, Monocyte% 8.2, Bands% 0.3 22 @ 07:12    11 May 2022 14:39    135    |  94     |  14     ----------------------------<  237    4.1     |  26     |  1.1      Ca    9.8        11 May 2022 14:39  Phos  4.8       11 May 2022 07:12  Mg     2.0       11 May 2022 07:12    TPro  7.1    /  Alb  4.6    /  TBili  0.4    /  DBili  x      /  AST  16     /  ALT  16     /  AlkPhos  88     11 May 2022 14:39              Troponin <0.01, CKMB --, CK -- 22 @ 12:21        Urinalysis Basic - ( 08 May 2022 10:25 )    Color: Yellow / Appearance: Clear / S.028 / pH: x  Gluc: x / Ketone: Trace  / Bili: Negative / Urobili: <2 mg/dL   Blood: x / Protein: Trace / Nitrite: Negative   Leuk Esterase: Negative / RBC: 4 /HPF / WBC 2 /HPF   Sq Epi: x / Non Sq Epi: 13 /HPF / Bacteria: Negative          Culture - Blood (collected 22 @ 07:53)  Source: .Blood Blood  Final Report (22 @ 15:05):    No Growth Final    Culture - Blood (collected 22 @ 17:13)  Source: .Blood Blood  Final Report (05-10-22 @ 23:00):    No Growth Final    Culture - Blood (collected 22 @ 17:13)  Source: .Blood Blood  Final Report (05-10-22 @ 23:00):    No Growth Final        =============    CAPILLARY BLOOD GLUCOSE        CARDIAC MARKERS ( 11 May 2022 12:21 )  x     / <0.01 ng/mL / x     / x     / x          LIVER FUNCTIONS - ( 11 May 2022 14:39 )  Alb: 4.6 g/dL / Pro: 7.1 g/dL / ALK PHOS: 88 U/L / ALT: 16 U/L / AST: 16 U/L / GGT: x               Radiology:   IMAGING:

## 2022-05-13 ENCOUNTER — RESULT REVIEW (OUTPATIENT)
Age: 52
End: 2022-05-13

## 2022-05-13 LAB
ALBUMIN SERPL ELPH-MCNC: 4.5 G/DL — SIGNIFICANT CHANGE UP (ref 3.5–5.2)
ALP SERPL-CCNC: 75 U/L — SIGNIFICANT CHANGE UP (ref 30–115)
ALT FLD-CCNC: 13 U/L — SIGNIFICANT CHANGE UP (ref 0–41)
ANION GAP SERPL CALC-SCNC: 12 MMOL/L — SIGNIFICANT CHANGE UP (ref 7–14)
AST SERPL-CCNC: 17 U/L — SIGNIFICANT CHANGE UP (ref 0–41)
BASOPHILS # BLD AUTO: 0.06 K/UL — SIGNIFICANT CHANGE UP (ref 0–0.2)
BASOPHILS NFR BLD AUTO: 0.7 % — SIGNIFICANT CHANGE UP (ref 0–1)
BILIRUB SERPL-MCNC: 0.5 MG/DL — SIGNIFICANT CHANGE UP (ref 0.2–1.2)
BUN SERPL-MCNC: 14 MG/DL — SIGNIFICANT CHANGE UP (ref 10–20)
C3 SERPL-MCNC: 189 MG/DL — HIGH (ref 81–157)
C4 SERPL-MCNC: 37 MG/DL — SIGNIFICANT CHANGE UP (ref 13–39)
CALCIUM SERPL-MCNC: 10 MG/DL — SIGNIFICANT CHANGE UP (ref 8.5–10.1)
CHLORIDE SERPL-SCNC: 101 MMOL/L — SIGNIFICANT CHANGE UP (ref 98–110)
CO2 SERPL-SCNC: 23 MMOL/L — SIGNIFICANT CHANGE UP (ref 17–32)
CREAT SERPL-MCNC: 0.9 MG/DL — SIGNIFICANT CHANGE UP (ref 0.7–1.5)
EGFR: 77 ML/MIN/1.73M2 — SIGNIFICANT CHANGE UP
EOSINOPHIL # BLD AUTO: 0.31 K/UL — SIGNIFICANT CHANGE UP (ref 0–0.7)
EOSINOPHIL NFR BLD AUTO: 3.6 % — SIGNIFICANT CHANGE UP (ref 0–8)
GLUCOSE SERPL-MCNC: 112 MG/DL — HIGH (ref 70–99)
HCT VFR BLD CALC: 42.5 % — SIGNIFICANT CHANGE UP (ref 37–47)
HGB BLD-MCNC: 14.5 G/DL — SIGNIFICANT CHANGE UP (ref 12–16)
IMM GRANULOCYTES NFR BLD AUTO: 0.2 % — SIGNIFICANT CHANGE UP (ref 0.1–0.3)
LYMPHOCYTES # BLD AUTO: 3.19 K/UL — SIGNIFICANT CHANGE UP (ref 1.2–3.4)
LYMPHOCYTES # BLD AUTO: 37.5 % — SIGNIFICANT CHANGE UP (ref 20.5–51.1)
MAGNESIUM SERPL-MCNC: 1.8 MG/DL — SIGNIFICANT CHANGE UP (ref 1.8–2.4)
MCHC RBC-ENTMCNC: 29.4 PG — SIGNIFICANT CHANGE UP (ref 27–31)
MCHC RBC-ENTMCNC: 34.1 G/DL — SIGNIFICANT CHANGE UP (ref 32–37)
MCV RBC AUTO: 86 FL — SIGNIFICANT CHANGE UP (ref 81–99)
MONOCYTES # BLD AUTO: 0.7 K/UL — HIGH (ref 0.1–0.6)
MONOCYTES NFR BLD AUTO: 8.2 % — SIGNIFICANT CHANGE UP (ref 1.7–9.3)
NEUTROPHILS # BLD AUTO: 4.23 K/UL — SIGNIFICANT CHANGE UP (ref 1.4–6.5)
NEUTROPHILS NFR BLD AUTO: 49.8 % — SIGNIFICANT CHANGE UP (ref 42.2–75.2)
NRBC # BLD: 0 /100 WBCS — SIGNIFICANT CHANGE UP (ref 0–0)
PHOSPHATE SERPL-MCNC: 3.7 MG/DL — SIGNIFICANT CHANGE UP (ref 2.1–4.9)
PLATELET # BLD AUTO: 210 K/UL — SIGNIFICANT CHANGE UP (ref 130–400)
POTASSIUM SERPL-MCNC: 4.6 MMOL/L — SIGNIFICANT CHANGE UP (ref 3.5–5)
POTASSIUM SERPL-SCNC: 4.6 MMOL/L — SIGNIFICANT CHANGE UP (ref 3.5–5)
PROT SERPL-MCNC: 7.2 G/DL — SIGNIFICANT CHANGE UP (ref 6–8)
RBC # BLD: 4.94 M/UL — SIGNIFICANT CHANGE UP (ref 4.2–5.4)
RBC # FLD: 13.1 % — SIGNIFICANT CHANGE UP (ref 11.5–14.5)
SODIUM SERPL-SCNC: 136 MMOL/L — SIGNIFICANT CHANGE UP (ref 135–146)
WBC # BLD: 8.51 K/UL — SIGNIFICANT CHANGE UP (ref 4.8–10.8)
WBC # FLD AUTO: 8.51 K/UL — SIGNIFICANT CHANGE UP (ref 4.8–10.8)

## 2022-05-13 PROCEDURE — 99233 SBSQ HOSP IP/OBS HIGH 50: CPT

## 2022-05-13 PROCEDURE — 11106 INCAL BX SKN SINGLE LES: CPT

## 2022-05-13 PROCEDURE — 99223 1ST HOSP IP/OBS HIGH 75: CPT

## 2022-05-13 PROCEDURE — 88350 IMFLUOR EA ADDL 1ANTB STN PX: CPT | Mod: 26

## 2022-05-13 PROCEDURE — 88346 IMFLUOR 1ST 1ANTB STAIN PX: CPT | Mod: 26

## 2022-05-13 PROCEDURE — 88305 TISSUE EXAM BY PATHOLOGIST: CPT | Mod: 26

## 2022-05-13 RX ORDER — FONDAPARINUX SODIUM 2.5 MG/.5ML
0.6 INJECTION, SOLUTION SUBCUTANEOUS
Qty: 0 | Refills: 0 | DISCHARGE

## 2022-05-13 RX ORDER — NYSTATIN CREAM 100000 [USP'U]/G
1 CREAM TOPICAL ONCE
Refills: 0 | Status: COMPLETED | OUTPATIENT
Start: 2022-05-13 | End: 2022-05-13

## 2022-05-13 RX ORDER — HEPARIN SODIUM 5000 [USP'U]/ML
3.5 INJECTION INTRAVENOUS; SUBCUTANEOUS
Qty: 210 | Refills: 0
Start: 2022-05-13 | End: 2022-06-11

## 2022-05-13 RX ORDER — HEPARIN SODIUM 5000 [USP'U]/ML
17500 INJECTION INTRAVENOUS; SUBCUTANEOUS EVERY 12 HOURS
Refills: 0 | Status: DISCONTINUED | OUTPATIENT
Start: 2022-05-14 | End: 2022-05-15

## 2022-05-13 RX ORDER — HEPARIN SODIUM 5000 [USP'U]/ML
1.75 INJECTION INTRAVENOUS; SUBCUTANEOUS
Qty: 105 | Refills: 0
Start: 2022-05-13 | End: 2022-06-11

## 2022-05-13 RX ORDER — HEPARIN SODIUM 5000 [USP'U]/ML
23700 INJECTION INTRAVENOUS; SUBCUTANEOUS ONCE
Refills: 0 | Status: COMPLETED | OUTPATIENT
Start: 2022-05-13 | End: 2022-05-13

## 2022-05-13 RX ORDER — LIDOCAINE HYDROCHLORIDE AND EPINEPHRINE 10; 10 MG/ML; UG/ML
20 INJECTION, SOLUTION INFILTRATION; PERINEURAL ONCE
Refills: 0 | Status: DISCONTINUED | OUTPATIENT
Start: 2022-05-13 | End: 2022-05-15

## 2022-05-13 RX ADMIN — MORPHINE SULFATE 120 MILLIGRAM(S): 50 CAPSULE, EXTENDED RELEASE ORAL at 13:47

## 2022-05-13 RX ADMIN — MORPHINE SULFATE 120 MILLIGRAM(S): 50 CAPSULE, EXTENDED RELEASE ORAL at 05:44

## 2022-05-13 RX ADMIN — MORPHINE SULFATE 120 MILLIGRAM(S): 50 CAPSULE, EXTENDED RELEASE ORAL at 20:33

## 2022-05-13 RX ADMIN — PREGABALIN 1000 MICROGRAM(S): 225 CAPSULE ORAL at 11:27

## 2022-05-13 RX ADMIN — Medication 1 MILLIGRAM(S): at 11:27

## 2022-05-13 RX ADMIN — NYSTATIN CREAM 1 APPLICATION(S): 100000 CREAM TOPICAL at 17:35

## 2022-05-13 RX ADMIN — Medication 100 MILLIGRAM(S): at 05:44

## 2022-05-13 RX ADMIN — HEPARIN SODIUM 23700 UNIT(S): 5000 INJECTION INTRAVENOUS; SUBCUTANEOUS at 17:36

## 2022-05-13 RX ADMIN — MORPHINE SULFATE 120 MILLIGRAM(S): 50 CAPSULE, EXTENDED RELEASE ORAL at 13:31

## 2022-05-13 RX ADMIN — Medication 81 MILLIGRAM(S): at 11:27

## 2022-05-13 RX ADMIN — Medication 1 MILLIGRAM(S): at 17:50

## 2022-05-13 RX ADMIN — Medication 50 MILLIGRAM(S): at 17:54

## 2022-05-13 NOTE — PROGRESS NOTE ADULT - ASSESSMENT
51 year old woman with PMH of SLE  lupus with lupus anticoagulant, antiphospholipid syndrome,  rheumatoid arthritis, cervical cancer sp hysterectomy,  laryngeal cancer sp CT, RT, CHF s/p AICD+PPM, DVT/PE in 2015 on Fondaparinux, NSTEMI in 2015, subclavian stenosis s/p stent placement, HTN, HLD, PPM/AICD presented to the hospital for a wound on the calf of the left lower extremity. pt has had this wound since last week with swelling of the calf, erythema, fever chills. She's been taking Augmentin for the last 6 days but has been worsening with black eschar. pt went to Dr Guan who performed a duplex, was negative    A/P:   #New onset right upper extremity bullous lesions 5/12  - burn consult - for new onset R upper extremity bullous lesion   - derm consult - possible fondaparinux-induced skin lesions   -. heme onc - possible hemorraghic dermatosis due to fondaparinux (may change to lovenox) - requesting Dr. Barajas.    #Left lower lesion w/ suspected cellulitis   -Blood cx no growth  -Arterial Duplex was normal.   -No fever  -Burn evaluated   - rheum c/s appreciated  - f/u rheum labs (e.g. C3, C4, etc.)   - the erythema resolved  - ID recommended to d/c ABx  < from: CT Angio Lower Extremity w/ IV Cont, Left (05.09.22 @ 22:59) >  Subcutaneous fat stranding and skin thickening in posteromedial calf area, which may reflect developing cellulitis.  No encapsulated or organized fluid collection.  < end of copied text >  -suspect the fat stranding due to possible engorged lymphatics   -d/w vasc: no further inpt workup  -advised pt to elevate LLE and use warm compresses  -5/12: new RUE ?hemorrhagic dermatitis due to ?Arixtra. D/w ID and heme. Pt wants to be switched to Heparin SQ injections. Derm and burn c/s for Bx.    #History of VTE/ PE on fondaparinux  - continue Fondaparinux for now.   -reports resolution of LUE edema    #History of long-QT syndrome and VT sp AICD  #HFpEF  - EF 63% 2019  - s/p AICD placement (s/p laser lead extraction of ICD leads and device and reimplanted with new RA/RV lead and new ICD device (Medtronic) in 2021)    #SLE/RA  #APLAS  - not in flare  - on Fondaparinux  -rheum f/u    #COPD  - inhalers and nebs for COPD   -counselled pt to avoid smoking and secondary exposure  - CXR w/out infiltrates (my read)    #Chronic pain syndrome  -I-Stop checked and meds ordered    #anxiety  -Xanax PRN    ===  Per Karl 5/12- Recommend therapeutic heparin, 250 units/kg q12 hours or the total dose divided by 3 every 8hours. Lovenox is an option if she agrees. 51 year old woman with PMH of SLE  lupus with lupus anticoagulant, antiphospholipid syndrome,  rheumatoid arthritis, cervical cancer sp hysterectomy,  laryngeal cancer sp CT, RT, CHF s/p AICD+PPM, DVT/PE in 2015 on Fondaparinux, NSTEMI in 2015, subclavian stenosis s/p stent placement, HTN, HLD, PPM/AICD presented to the hospital for a wound on the calf of the left lower extremity. pt has had this wound since last week with swelling of the calf, erythema, fever chills. She's been taking Augmentin for the last 6 days but has been worsening with black eschar. pt went to Dr Guan who performed a duplex, was negative    A/P:   #New onset right upper extremity bullous lesions 5/12  - burn consult - for new onset R upper extremity bullous lesion   - derm consult - possible fondaparinux-induced skin lesions   - heme onc - possible hemorraghic dermatosis due to fondaparinux (may change to lovenox) - requesting Dr. Barajas, who recommend therapeutic heparin, 250 units/kg q12 hours or the total dose divided by 3 every 8hours. Lovenox is an option if she agrees.      #Left lower lesion w/ suspected cellulitis   -Blood cx no growth  -Arterial Duplex was normal.   -No fever  -Burn evaluated   - rheum c/s appreciated  - f/u rheum labs (e.g. C3, C4, etc.)   - the erythema resolved  - ID recommended to d/c ABx  < from: CT Angio Lower Extremity w/ IV Cont, Left (05.09.22 @ 22:59) >  Subcutaneous fat stranding and skin thickening in posteromedial calf area, which may reflect developing cellulitis.  No encapsulated or organized fluid collection.  < end of copied text >  -suspect the fat stranding due to possible engorged lymphatics   -d/w vasc: no further inpt workup  -advised pt to elevate LLE and use warm compresses  -5/12: new RUE ?hemorrhagic dermatitis due to ?Arixtra. D/w ID and heme. Pt wants to be switched to Heparin SQ injections. Derm and burn c/s for Bx.    #History of VTE/ PE on fondaparinux  - continue Fondaparinux for now.   -reports resolution of LUE edema    #History of long-QT syndrome and VT sp AICD  #HFpEF  - EF 63% 2019  - s/p AICD placement (s/p laser lead extraction of ICD leads and device and reimplanted with new RA/RV lead and new ICD device (Medtronic) in 2021)    #SLE/RA  #APLAS  - not in flare  - on Fondaparinux  -rheum f/u    #COPD  - inhalers and nebs for COPD   -counselled pt to avoid smoking and secondary exposure  - CXR w/out infiltrates (my read)    #Chronic pain syndrome  -I-Stop checked and meds ordered    #anxiety  -Xanax PRN    ===      #dispo   - possible dc today 51 year old woman with PMH of SLE  lupus with lupus anticoagulant, antiphospholipid syndrome,  rheumatoid arthritis, cervical cancer sp hysterectomy,  laryngeal cancer sp CT, RT, CHF s/p AICD+PPM, DVT/PE in 2015 on Fondaparinux, NSTEMI in 2015, subclavian stenosis s/p stent placement, HTN, HLD, PPM/AICD presented to the hospital for a wound on the calf of the left lower extremity. pt has had this wound since last week with swelling of the calf, erythema, fever chills. She's been taking Augmentin for the last 6 days but has been worsening with black eschar. pt went to Dr Guan who performed a duplex, was negative    A/P:   #New onset right upper extremity bullous lesions 5/12  - burn consult - for new onset R upper extremity bullous lesion   - heme onc - possible hemorraghic dermatosis due to fondaparinux (may change to lovenox) - requesting Dr. Barajas, who recommend therapeutic heparin, 250 units/kg q12 hours or the total dose divided by 3 every 8hours. Lovenox is an option if she agrees.  - 5/13 per derm: skin lesions suggestive of hemorrhagic papular eruption secondary to fondaparinux use; f/u burn recommendations regarding skin biopsy to r/o underlying vasculitis especially in the setting of SLE and lupus anticoagulant     #Left lower lesion w/ suspected cellulitis   -Blood cx no growth  -Arterial Duplex was normal.   -No fever  -Burn evaluated   - rheum c/s appreciated  - f/u rheum labs (e.g. C3, C4, etc.)   - the erythema resolved  - ID recommended to d/c ABx  < from: CT Angio Lower Extremity w/ IV Cont, Left (05.09.22 @ 22:59) >  Subcutaneous fat stranding and skin thickening in posteromedial calf area, which may reflect developing cellulitis.  No encapsulated or organized fluid collection.  < end of copied text >  -suspect the fat stranding due to possible engorged lymphatics   -d/w vasc: no further inpt workup  -advised pt to elevate LLE and use warm compresses  -5/12: new RUE ?hemorrhagic dermatitis due to ?Arixtra. D/w ID and heme. Pt wants to be switched to Heparin SQ injections. Derm and burn c/s for Bx.    #History of VTE/ PE on fondaparinux  - continue Fondaparinux for now.   -reports resolution of LUE edema    #History of long-QT syndrome and VT sp AICD  #HFpEF  - EF 63% 2019  - s/p AICD placement (s/p laser lead extraction of ICD leads and device and reimplanted with new RA/RV lead and new ICD device (Medtronic) in 2021)    #SLE/RA  #APLAS  - not in flare  - on Fondaparinux  -rheum f/u    #COPD  - inhalers and nebs for COPD   -counselled pt to avoid smoking and secondary exposure  - CXR w/out infiltrates (my read)    #Chronic pain syndrome  -I-Stop checked and meds ordered    #anxiety  -Xanax PRN    #handoff  -pending clarification of AC from heme-onc team ([facility]); tentative plan for dc on heparin subQ    #dispo   - possible dc tomorrow 51 year old woman with PMH of SLE  lupus with lupus anticoagulant, antiphospholipid syndrome,  rheumatoid arthritis, cervical cancer sp hysterectomy,  laryngeal cancer sp CT, RT, CHF s/p AICD+PPM, DVT/PE in 2015 on Fondaparinux, NSTEMI in 2015, subclavian stenosis s/p stent placement, HTN, HLD, PPM/AICD presented to the hospital for a wound on the calf of the left lower extremity. pt has had this wound since last week with swelling of the calf, erythema, fever chills. She's been taking Augmentin for the last 6 days but has been worsening with black eschar. pt went to Dr Guan who performed a duplex, was negative    A/P:   #New onset right upper extremity bullous lesions 5/12  - burn consult - for new onset R upper extremity bullous lesion   - heme onc - possible hemorraghic dermatosis due to fondaparinux (may change to lovenox) - requesting Dr. Barajas, who recommend therapeutic heparin, 250 units/kg q12 hours or the total dose divided by 3 every 8hours. Lovenox is an option if she agrees.  - 5/13 per derm: skin lesions suggestive of hemorrhagic papular eruption secondary to fondaparinux use; f/u burn recommendations regarding skin biopsy to r/o underlying vasculitis especially in the setting of SLE and lupus anticoagulant   - 5/13 PM;: sent script for [facility]q q12h to [facility] (#[id number]); discussed with pharmacist   --- per pharmacist, needs to special order heparin because no heparin is available in pharmacy - should arrive by 5/14;   --- per pharmacist, associated syringe is BD syringe 27 gauge 1 mL, which comes with attached needle, so no need to order needle separately.   - pt given heparin subQ bolus and provided instructions on how to administer;    #Left lower lesion w/ suspected cellulitis   -Blood cx no growth  -Arterial Duplex was normal.   -No fever  -Burn evaluated   - rheum c/s appreciated  - f/u rheum labs (e.g. C3, C4, etc.)   - the erythema resolved  - ID recommended to d/c ABx  < from: CT Angio Lower Extremity w/ IV Cont, Left (05.09.22 @ 22:59) >  Subcutaneous fat stranding and skin thickening in posteromedial calf area, which may reflect developing cellulitis.  No encapsulated or organized fluid collection.  < end of copied text >  -suspect the fat stranding due to possible engorged lymphatics   -d/w vasc: no further inpt workup  -advised pt to elevate LLE and use warm compresses  -5/12: new RUE ?hemorrhagic dermatitis due to ?Arixtra. D/w ID and heme. Pt wants to be switched to Heparin SQ injections. Derm and burn c/s for Bx.    #History of VTE/ PE on fondaparinux  - continue Fondaparinux for now.   -reports resolution of LUE edema    #History of long-QT syndrome and VT sp AICD  #HFpEF  - EF 63% 2019  - s/p AICD placement (s/p laser lead extraction of ICD leads and device and reimplanted with new RA/RV lead and new ICD device (Medtronic) in 2021)    #SLE/RA  #APLAS  - not in flare  - on Fondaparinux  -rheum f/u    #COPD  - inhalers and nebs for COPD   -counselled pt to avoid smoking and secondary exposure  - CXR w/out infiltrates (my read)    #Chronic pain syndrome  -I-Stop checked and meds ordered    #anxiety  -Xanax PRN    #handoff  - heparin subQ confirmation from [facility] on 5/14     #dispo   - possible dc tomorrow

## 2022-05-13 NOTE — PHARMACOTHERAPY INTERVENTION NOTE - OUTCOME
January 5, 2021      Jossue Torres  [address] [facility] [id number]        Dear ,    We are writing to inform you of your test results.    Your tests are acceptable.    Resulted Orders   Basic metabolic panel   Result Value Ref Range    Sodium 138 133 - 144 mmol/L    Potassium 3.8 3.4 - 5.3 mmol/L    Chloride 110 (H) 94 - 109 mmol/L    Carbon Dioxide 25 20 - 32 mmol/L    Anion Gap 3 3 - 14 mmol/L    Glucose 81 70 - 99 mg/dL      Comment:      Non Fasting    Urea Nitrogen 14 7 - 30 mg/dL    Creatinine 1.15 0.66 - 1.25 mg/dL    GFR Estimate 73 >60 mL/min/[1.73_m2]      Comment:      Non  GFR Calc  Starting 12/18/2018, serum creatinine based estimated GFR (eGFR) will be   calculated using the Chronic Kidney Disease Epidemiology Collaboration   (CKD-EPI) equation.      GFR Estimate If Black 85 >60 mL/min/[1.73_m2]      Comment:       GFR Calc  Starting 12/18/2018, serum creatinine based estimated GFR (eGFR) will be   calculated using the Chronic Kidney Disease Epidemiology Collaboration   (CKD-EPI) equation.      Calcium 8.4 (L) 8.5 - 10.1 mg/dL   Lipid panel reflex to direct LDL Non-fasting   Result Value Ref Range    Cholesterol 216 (H) <200 mg/dL      Comment:      Desirable:       <200 mg/dl    Triglycerides 200 (H) <150 mg/dL      Comment:      Borderline high:  150-199 mg/dl  High:             200-499 mg/dl  Very high:       >499 mg/dl  Non Fasting      HDL Cholesterol 49 >39 mg/dL    LDL Cholesterol Calculated 127 (H) <100 mg/dL      Comment:      Above desirable:  100-129 mg/dl  Borderline High:  130-159 mg/dL  High:             160-189 mg/dL  Very high:       >189 mg/dl      Non HDL Cholesterol 167 (H) <130 mg/dL      Comment:      Above Desirable:  130-159 mg/dl  Borderline high:  160-189 mg/dl  High:             190-219 mg/dl  Very high:       >219 mg/dl         If you have any questions or concerns, please call the clinic at the number listed above.        Sincerely,      Tyler Paz MD           accepted

## 2022-05-13 NOTE — CONSULT NOTE ADULT - ASSESSMENT
51 year old woman with PMH of SLE  lupus with lupus anticoagulant, antiphospholipid syndrome,  rheumatoid arthritis, cervical cancer sp hysterectomy,  laryngeal cancer sp CT, RT, CHF s/p AICD+PPM, DVT/PE in 2015 on Fondaparinux, NSTEMI in 2015, subclavian stenosis s/p stent placement, HTN, HLD, PPM/AICD presented to the hospital for a wound on the calf of the left lower extremit      heparin 250 u/kg q12    couldnt tolerate lovenoz due to rash/bumps  warfarin not thrapeutic  clotted on xarelto  never had reaxtion to heparin  Allergic to plaquenil  Fondaparinux caused the hemorrhagic bullae    clindamycin left calf  right lesion biopsy    referral to lupus center in the [place]  follows coaf in [facility] 51 year old woman with PMH of SLE  lupus with lupus anticoagulant, antiphospholipid syndrome,  rheumatoid arthritis, cervical cancer sp hysterectomy,  laryngeal cancer sp CT, RT, CHF s/p AICD+PPM, DVT/PE in 2015 on Fondaparinux, NSTEMI in 2015, subclavian stenosis s/p stent placement, HTN, HLD, PPM/AICD presented to the hospital for a wound on the calf of the left lower extremit    Assessment:    # Lower extremity lesion  # Skin lesions, suspected hemorrhagic bullae  # Antiphospholipid syndrome: She could not maintain therapeutic INR on warfarin, she clotted on DOAC, she developed a rash and bruising on lovenox, and this admission developed suspected hemorrhagic bullae from fondaparinux.  We discussed trying warfarin or lovenox again, however patient refused. She agreed to therapeutic SQ heparin.     Plan:  - Therapeutic heparin, give 1 time bolus of 333u/kg (~23,700) and then 250u/kg (17,500u)  - She will be referred to [facility]  - Will consider rituxan outpatient (pt allergic to Plaquenil)  - F/u biopsy results  - F/u with hematology outpatient  - Abx per primary     51 year old woman with PMH of SLE  lupus with lupus anticoagulant, antiphospholipid syndrome,  rheumatoid arthritis, cervical cancer sp hysterectomy,  laryngeal cancer sp CT, RT, CHF s/p AICD+PPM, DVT/PE in 2015 on Fondaparinux, NSTEMI in 2015, subclavian stenosis s/p stent placement, HTN, HLD, PPM/AICD presented to the hospital for a wound on the calf of the left lower extremit    Assessment:    # Lower extremity lesion  # Skin lesions, suspected dermatitis with hemorrhagic bullae ?secondary to fondaparinux?  # Antiphospholipid syndrome: She could not maintain therapeutic INR on warfarin, she clotted on DOAC, she developed a rash and bruising on Lovenox, and this admission developed suspected hemorrhagic bullae from fondaparinux.  We discussed trying warfarin or Lovenox again, however patient refused. She agreed to therapeutic SQ heparin.     Plan:  - Therapeutic heparin, give 1 time bolus of 333u/kg (~23,700) and then 250u/kg (17,500u)  - She will be referred to [facility]  - Will consider Rituxan outpatient (pt allergic to Plaquenil)  - F/u biopsy results  - F/u with hematology outpatient  - Abx per primary  - Consider a second opinion at the SLE center at [facility].

## 2022-05-13 NOTE — PHARMACOTHERAPY INTERVENTION NOTE - COMMENTS
heparin 23, 700 units sq times x  s/w t8 resident and dr waite, order per hem/onc dr neil and dr mtichell. for antiphospholipid syndrome, failed other treatment modalities.

## 2022-05-13 NOTE — CONSULT NOTE ADULT - ASSESSMENT
51 F with PMH of SLE  lupus with lupus anticoagulant, antiphospholipid syndrome,  rheumatoid arthritis, cervical cancer sp hysterectomy,  laryngeal cancer sp CT, RT, CHF s/p AICD+PPM, DVT/PE in 2015 on Fondaparinux, NSTEMI in 2015, subclavian stenosis s/p stent placement, HTN, HLD, PPM/AICD presented to the hospital for a wound on the calf of the left lower extremity. During hospital stay developed new RUE hemorrhagic dermatitis suspected from ?Arixtra. BURN Consult requested for skin biopsy.     Plan:  - skin biopsy of RUE hemorrhagic lesion done today, sutures applied,   - cont local wound care daily: wash with soup and water, apply bacitracin, cover with Tegaderm  - pt will need to follow up in [facility] in 12-14 days to remove sutures; call for an appointment [phone]

## 2022-05-13 NOTE — CONSULT NOTE ADULT - CONSULT REASON
wound
LLEx swelling
RUE hemorrhagic bullous lesion
r/o vasculitis induced SLE
skin biopsy requested
apls
LLE wound

## 2022-05-13 NOTE — PROGRESS NOTE ADULT - SUBJECTIVE AND OBJECTIVE BOX
Patient is a 51y old  Female who presents with a chief complaint of left lower extremity wound (09 May 2022 13:37)    INTERVAL HPI/OVERNIGHT EVENTS: Patient was examined and seen at bedside. This morning pt is resting comfortably in bed. Improved Sx in LLE, decreased swelling and pain. The lesions on RUE are unchanged. S/p Bx by burn. Pt wants to stop Arixtra and does not want to go back on Lovenox (thinks Lovenox gave her SQ nodules). No CP, SOB, AP, HA.    All other systems reviewed and are within normal limits.  InitialHPI:  51 year old woman with PMH of SLE  lupus with lupus anticoagulant, antiphospholipid syndrome,  rheumatoid arthritis, cervical cancer sp hysterectomy,  laryngeal cancer sp CT, RT, CHF s/p AICD+PPM, DVT/PE in 2015 on Fondaparinux, NSTEMI in 2015, subclavian stenosis s/p stent placement, HTN, HLD, PPM/AICD presented to the hospital for a wound on the calf of the left lower extremity. pt has had this wound since last week with swelling of the calf, erythema, fever chills. She's been taking Augmentin for the last 6 days but has been worsening with black eschar. pt went to Dr Guan who performed a duplex, was negative  in the ED,pt's VS T(C): 36.8 (05-05-22 @ 14:33), Max: 36.8 (05-05-22 @ 14:33)  HR: 87 (05-05-22 @ 15:58) (87 - 114)  BP: 138/67 (05-05-22 @ 15:58) (138/67 - 142/91)  RR: 18 (05-05-22 @ 14:33) (18 - 18)  SpO2: 96% (05-05-22 @ 14:33) (96% - 96%), labs done were unremarkable. Duplex done as outpatient was negative.   pt received aztreonam, metronidazole, Vancomycin.   pt is admitted for workup/management (05 May 2022 20:56)    PAST MEDICAL & SURGICAL HISTORY:  Lupus    RA (rheumatoid arthritis)    HTN (hypertension)    CHF (congestive heart failure)    COPD (chronic obstructive pulmonary disease)    DVT (deep venous thrombosis)    Pulmonary embolism    History of laryngeal cancer    GERD (gastroesophageal reflux disease)    Cervical cancer    S/P appendectomy    S/P cholecystectomy    AICD (automatic cardioverter/defibrillator) present    S/P hysterectomy    History of back surgery    H/O foot surgery    S/P eye surgery    Subclavian arterial stenosis        General: NAD, AAO3  HEENT:  EOMI, no LAD  CV: S1 S2  Resp: decreased breath sounds at bases  GI: NT/ND/S +BS  MS: Lt calf decreased swelling. No erythema; initial pustule/bullae resolved; post calf +echymoses, decreasing induration. RUE dark bullar around 10mm (s/p Bx and now C/d/i dsg) w/ a few smaller bullae. Also small dark bullae on Rt thigh  Neuro: nonfocal, +reflexes thruout            MEDICATIONS  (STANDING):  albuterol/ipratropium for Nebulization 3 milliLiter(s) Nebulizer every 6 hours  aspirin enteric coated 81 milliGRAM(s) Oral daily  budesonide 160 MICROgram(s)/formoterol 4.5 MICROgram(s) Inhaler 2 Puff(s) Inhalation two times a day  cyanocobalamin 1000 MICROGram(s) Oral daily  folic acid 1 milliGRAM(s) Oral daily  heparin   Injectable 80176 Unit(s) SubCutaneous once  hydrALAZINE 50 milliGRAM(s) Oral two times a day  lidocaine 1%/epinephrine 1:100,000 Inj 20 milliLiter(s) Local Injection once  metoprolol succinate  milliGRAM(s) Oral daily  morphine ER Tablet 120 milliGRAM(s) Oral <User Schedule>    MEDICATIONS  (PRN):  acetaminophen     Tablet .. 650 milliGRAM(s) Oral every 6 hours PRN Temp greater or equal to 38C (100.4F), Mild Pain (1 - 3)  ALPRAZolam 1 milliGRAM(s) Oral every 6 hours PRN anxiety  aluminum hydroxide/magnesium hydroxide/simethicone Suspension 30 milliLiter(s) Oral every 4 hours PRN Dyspepsia  melatonin 3 milliGRAM(s) Oral at bedtime PRN Insomnia  ondansetron    Tablet 4 milliGRAM(s) Oral every 8 hours PRN Nausea and/or Vomiting  simethicone 80 milliGRAM(s) Chew two times a day PRN Indigestion    Home Medications:  albuterol 90 mcg/inh inhalation aerosol: 2 puff(s) inhaled every 6 hours, As Needed (11 May 2022 16:03)  ALPRAZolam 1 mg oral tablet: 2 tab(s) orally 3 times a day, As Needed (05 May 2022 21:12)  cyanocobalamin 1000 mcg/mL injectable solution: 1 milliliter(s) injectable once a week (05 May 2022 21:12)  folic acid 1 mg oral tablet: 1 tab(s) orally once a day (05 May 2022 21:12)  hydrALAZINE 50 mg oral tablet: 1 tab(s) orally 2 times a day (05 May 2022 21:12)  Metoprolol Succinate  mg oral tablet, extended release: 1 tab(s) orally once a day (05 May 2022 21:12)  MS Contin 60 mg oral tablet, extended release: 2 tab(s) orally 3 times a day (after meals), As Needed (11 May 2022 16:03)  Trelegy Ellipta 100 mcg-62.5 mcg-25 mcg/inh inhalation powder: 1 puff(s) inhaled once a day (05 May 2022 21:12)    Vital Signs Last 24 Hrs  T(C): 36.5 (13 May 2022 14:42), Max: 36.5 (13 May 2022 14:42)  T(F): 97.7 (13 May 2022 14:42), Max: 97.7 (13 May 2022 14:42)  HR: 82 (13 May 2022 14:42) (74 - 86)  BP: 140/66 (13 May 2022 14:42) (111/53 - 140/66)  BP(mean): 99 (13 May 2022 14:42) (99 - 99)  RR: 18 (13 May 2022 14:42) (18 - 19)  SpO2: 98% (13 May 2022 14:42) (96% - 98%)  CAPILLARY BLOOD GLUCOSE        LABS:                        14.5   8.51  )-----------( 210      ( 13 May 2022 06:15 )             42.5     05-13    136  |  101  |  14  ----------------------------<  112<H>  4.6   |  23  |  0.9    Ca    10.0      13 May 2022 06:15  Phos  3.7     05-13  Mg     1.8     05-13    TPro  7.2  /  Alb  4.5  /  TBili  0.5  /  DBili  x   /  AST  17  /  ALT  13  /  AlkPhos  75  05-13    LIVER FUNCTIONS - ( 13 May 2022 06:15 )  Alb: 4.5 g/dL / Pro: 7.2 g/dL / ALK PHOS: 75 U/L / ALT: 13 U/L / AST: 17 U/L / GGT: x                           Consultant Notes Reviewed:  [x ] YES  [ ] NO  Care Discussed with Consultants/Other Providers/ Housestaff [ x] YES  [ ] NO  Radiology, labs, new studies personally reviewed.

## 2022-05-13 NOTE — CONSULT NOTE ADULT - SUBJECTIVE AND OBJECTIVE BOX
Patient is a 51y old  Female who presents with a chief complaint of left lower extremity wound (13 May 2022 06:02)  developed new RUE bullous hemorrhagic lesion during her stay   dermatology consulted for this new lesion most likely secondary to hemorrhagic bullous lesion secondary to fondaparinux use     OVERNIGHT EVENTS:    SUBJECTIVE / INTERVAL HPI: Patient seen and examined at bedside.     VITAL SIGNS:  Vital Signs Last 24 Hrs  T(C): 36.1 (13 May 2022 04:35), Max: 36.4 (12 May 2022 20:59)  T(F): 96.9 (13 May 2022 04:35), Max: 97.6 (12 May 2022 20:59)  HR: 74 (13 May 2022 04:35) (74 - 86)  BP: 111/53 (13 May 2022 04:35) (111/53 - 123/58)  BP(mean): --  RR: 19 (13 May 2022 04:35) (18 - 19)  SpO2: 96% (13 May 2022 05:57) (96% - 96%)    PHYSICAL EXAM:    General: WDWN  HEENT: NC/AT; PERRL, clear conjunctiva  Neck: supple  Cardiovascular: +S1/S2; RRR  Respiratory: CTA b/l; no W/R/R  Gastrointestinal: soft, NT/ND; +BSx4  Extremities: WWP; 2+ peripheral pulses; no edema   Neurological: AAOx3; no focal deficits    MEDICATIONS:  MEDICATIONS  (STANDING):  albuterol/ipratropium for Nebulization 3 milliLiter(s) Nebulizer every 6 hours  aspirin enteric coated 81 milliGRAM(s) Oral daily  budesonide 160 MICROgram(s)/formoterol 4.5 MICROgram(s) Inhaler 2 Puff(s) Inhalation two times a day  cyanocobalamin 1000 MICROGram(s) Oral daily  folic acid 1 milliGRAM(s) Oral daily  hydrALAZINE 50 milliGRAM(s) Oral two times a day  metoprolol succinate  milliGRAM(s) Oral daily  morphine ER Tablet 120 milliGRAM(s) Oral <User Schedule>    MEDICATIONS  (PRN):  acetaminophen     Tablet .. 650 milliGRAM(s) Oral every 6 hours PRN Temp greater or equal to 38C (100.4F), Mild Pain (1 - 3)  ALPRAZolam 1 milliGRAM(s) Oral every 6 hours PRN anxiety  aluminum hydroxide/magnesium hydroxide/simethicone Suspension 30 milliLiter(s) Oral every 4 hours PRN Dyspepsia  melatonin 3 milliGRAM(s) Oral at bedtime PRN Insomnia  ondansetron    Tablet 4 milliGRAM(s) Oral every 8 hours PRN Nausea and/or Vomiting  simethicone 80 milliGRAM(s) Chew two times a day PRN Indigestion      ALLERGIES:  Allergies    adhesives (Rash; Urticaria)  Arava (Anaphylaxis; Angioedema)  Avelox (Other)  Plaquenil (Other)  Vantin (Other (Mild))    Intolerances        LABS:                        14.5   8.51  )-----------( 210      ( 13 May 2022 06:15 )             42.5     05-13    136  |  101  |  14  ----------------------------<  112<H>  4.6   |  23  |  0.9    Ca    10.0      13 May 2022 06:15  Phos  3.7     05-13  Mg     1.8     05-13    TPro  7.2  /  Alb  4.5  /  TBili  0.5  /  DBili  x   /  AST  17  /  ALT  13  /  AlkPhos  75  05-13        CAPILLARY BLOOD GLUCOSE           Patient is a 51y old  Female who presents with a chief complaint of left lower extremity wound (13 May 2022 06:02)  developed new RUE bullous hemorrhagic lesion during her stay   dermatology consulted for this new lesion most likely secondary to hemorrhagic bullous lesion secondary to fondaparinux use     SUBJECTIVE / INTERVAL HPI: Patient seen and examined at bedside. complaining of sob, anxiety and wants to go home     VITAL SIGNS:  Vital Signs Last 24 Hrs  T(C): 36.1 (13 May 2022 04:35), Max: 36.4 (12 May 2022 20:59)  T(F): 96.9 (13 May 2022 04:35), Max: 97.6 (12 May 2022 20:59)  HR: 74 (13 May 2022 04:35) (74 - 86)  BP: 111/53 (13 May 2022 04:35) (111/53 - 123/58)  BP(mean): --  RR: 19 (13 May 2022 04:35) (18 - 19)  SpO2: 96% (13 May 2022 05:57) (96% - 96%)    PHYSICAL EXAM:    General: WDWN  Extremities: + bilateral upper and lower extremities subcutaneous ecchymoses, + small crusted hemorrhagic bullous lesions on RUE and left inner thigh, no skin sloghing     MEDICATIONS:  MEDICATIONS  (STANDING):  albuterol/ipratropium for Nebulization 3 milliLiter(s) Nebulizer every 6 hours  aspirin enteric coated 81 milliGRAM(s) Oral daily  budesonide 160 MICROgram(s)/formoterol 4.5 MICROgram(s) Inhaler 2 Puff(s) Inhalation two times a day  cyanocobalamin 1000 MICROGram(s) Oral daily  folic acid 1 milliGRAM(s) Oral daily  hydrALAZINE 50 milliGRAM(s) Oral two times a day  metoprolol succinate  milliGRAM(s) Oral daily  morphine ER Tablet 120 milliGRAM(s) Oral <User Schedule>    MEDICATIONS  (PRN):  acetaminophen     Tablet .. 650 milliGRAM(s) Oral every 6 hours PRN Temp greater or equal to 38C (100.4F), Mild Pain (1 - 3)  ALPRAZolam 1 milliGRAM(s) Oral every 6 hours PRN anxiety  aluminum hydroxide/magnesium hydroxide/simethicone Suspension 30 milliLiter(s) Oral every 4 hours PRN Dyspepsia  melatonin 3 milliGRAM(s) Oral at bedtime PRN Insomnia  ondansetron    Tablet 4 milliGRAM(s) Oral every 8 hours PRN Nausea and/or Vomiting  simethicone 80 milliGRAM(s) Chew two times a day PRN Indigestion      ALLERGIES:  Allergies    adhesives (Rash; Urticaria)  Arava (Anaphylaxis; Angioedema)  Avelox (Other)  Plaquenil (Other)  Vantin (Other (Mild))    Intolerances        LABS:                        14.5   8.51  )-----------( 210      ( 13 May 2022 06:15 )             42.5     05-13    136  |  101  |  14  ----------------------------<  112<H>  4.6   |  23  |  0.9    Ca    10.0      13 May 2022 06:15  Phos  3.7     05-13  Mg     1.8     05-13    TPro  7.2  /  Alb  4.5  /  TBili  0.5  /  DBili  x   /  AST  17  /  ALT  13  /  AlkPhos  75  05-13        CAPILLARY BLOOD GLUCOSE

## 2022-05-13 NOTE — CONSULT NOTE ADULT - CONSULT REQUESTED DATE/TIME
10-May-2022 18:30
13-May-2022 09:33
09-May-2022 13:39
13-May-2022 18:10
13-May-2022 15:27
06-May-2022 00:00
07-May-2022 12:30

## 2022-05-13 NOTE — CONSULT NOTE ADULT - REASON FOR ADMISSION
[facility]  Part of [address] [address]    
left lower extremity wound
left lower extremity wound
mcg  Take 1 tablet (125 mcg) by mouth daily  Quantity: 90 tablet  Refills: 3     diltiazem ER COATED BEADS 180 MG 24 hr capsule  Commonly known as: Cartia XT  Used for: Chronic atrial fibrillation (H), Paroxysmal atrial fibrillation (H)      Dose: 180 mg  Take 1 capsule (180 mg) by mouth daily  Quantity: 180 capsule  Refills: 3     empagliflozin 10 MG Tabs tablet  Commonly known as: JARDIANCE  Used for: Encounter for medication review, Diabetes mellitus type 2, insulin dependent (H), Hyperglycemia      Dose: 10 mg  Take 1 tablet (10 mg) by mouth every morning  Quantity: 90 tablet  Refills: 0     fish oil-omega-3 fatty acids 1000 MG capsule  Used for: Coronary artery disease involving native coronary artery of native heart without angina pectoris      Take 1 capsule by mouth daily  Quantity: 180 each  Refills: 2     gabapentin 100 MG capsule  Commonly known as: NEURONTIN  Used for: Diabetic polyneuropathy associated with type 2 diabetes mellitus (H)      TAKE 1 CAPSULE BY MOUTH THREE TIMES DAILY  Quantity: 90 capsule  Refills: 11     GlucGlaukosm Blood Glucose Monitor Claribel  [id number] - [place] - [address]  [address], [facility] [address]    Phone: [phone]     polyethylene glycol 17 GM/Dose powder         We also reviewed 
left lower extremity wound
left lower extremity wound
Bandar Del Toro'Lindsay Lovell, [facility]    
left lower extremity wound
left lower extremity wound

## 2022-05-13 NOTE — PROGRESS NOTE ADULT - ASSESSMENT
51 year old woman with PMH of SLE  lupus with lupus anticoagulant, antiphospholipid syndrome,  rheumatoid arthritis, cervical cancer sp hysterectomy,  laryngeal cancer sp CT, RT, CHF s/p AICD+PPM, DVT/PE in 2015 on Fondaparinux, NSTEMI in 2015, subclavian stenosis s/p stent placement, HTN, HLD, PPM/AICD presented to the hospital for a wound on the calf of the left lower extremity. pt has had this wound since last week with swelling of the calf, erythema, fever chills. She's been taking Augmentin for the last 6 days but has been worsening with black eschar. pt went to Dr Guan who performed a duplex, was negative    A/P:   #Left lower extremity Hemorrhagic bullae w/ superimposed cellulitis   #RUE hemorrhagic dermatitis due to ?Arixtra  -Blood cx no growth  -Arterial Duplex was normal.   -No fever  -Burn evaluated   - rheum c/s appreciated  - f/u rheum labs (e.g. C3, C4, etc.)   - the erythema resolved  - ID recommended to d/c ABx  < from: CT Angio Lower Extremity w/ IV Cont, Left (05.09.22 @ 22:59) >  Subcutaneous fat stranding and skin thickening in posteromedial calf area, which may reflect developing cellulitis.  No encapsulated or organized fluid collection.  < end of copied text >  -suspect the fat stranding due to possible engorged lymphatics   -d/w vasc: no further inpt workup  -advised pt to elevate LLE and use warm compresses  -5/12: new RUE ?hemorrhagic dermatitis due to ?Arixtra. D/w ID and heme. Pt wants to be switched to Heparin SQ injections. Derm and burn c/s for Bx.  -5/13: s/p skin Bx. Pt declines Arixtra and Lovenox and wants to go home on Heparin SQ therapeutic. Had 3 separate conversation w/ pt thruout the day about the off label use of Heparin SQ and risks involved. Pt insists that she did it before when she was pregnant. Pt aware of risks and the need to draw and measure/calculate the amount to be injected in ml(s). Pt verbalizes the risks and wants to proceed w/ Heparin SQ. D/w hematology the dosing and ordered inpt and outpt ([facility]). Asked nursing to start teaching pt how to measure/calculate the dose based on concentration. As per [facility], will be delivered by tomorrow as a special order.    #History of VTE/ PE  - A/c as above     #History of long-QT syndrome and VT sp AICD  #HFpEF  - EF 63% 2019  - s/p AICD placement (s/p laser lead extraction of ICD leads and device and reimplanted with new RA/RV lead and new ICD device ([employer]) in 2021)    #SLE/RA  #APLAS  - not in flare  - A/c as above  -rheum f/u outpt    #COPD  - inhalers and nebs for COPD   -counselled pt to avoid smoking and secondary exposure  - CXR w/out infiltrates (my read)    #Chronic pain syndrome  -I-Stop checked and meds ordered    #anxiety  -Xanax PRN    Dispo: once pt/ show comfort measuring/injecting Heparin and medication delivered to the [facility]    My note supersedes the residents note should a discrepancy arise.    Chart and notes personally reviewed.  Care Discussed with Consultants/Other Providers/ Housestaff [ x] YES [ ] NO   Radiology, labs, old records personally reviewed.    discussed w/ housestaff, nursing, case management, ID, heme Dr. Terry,      Risk Statement (NON-critical care).     On this date of service, level of risk to patient is considered: APLAS, multiple medical problems, new hemorrhagic bullae on anticoagulation    Time-based billing (NON-critical care).     90 minutes spent on total encounter; more than 50% of the visit was spent counseling and / or coordinating care by the attending physician.  The necessity of the time spent during the encounter on this date of service was due to:     time spent on review of labs, imaging studies, old records, obtaining history, personally examining patient, counselling and communicating with patient/ family, entering orders for medications/tests/etc, discussions with other health care providers, documentation in electronic health records, independent interpretation of labs, imaging/procedure results and care coordination.

## 2022-05-13 NOTE — CONSULT NOTE ADULT - SUBJECTIVE AND OBJECTIVE BOX
51 F with PMH of SLE  lupus with lupus anticoagulant, antiphospholipid syndrome,  rheumatoid arthritis, cervical cancer sp hysterectomy,  laryngeal cancer sp CT, RT, CHF s/p AICD+PPM, DVT/PE in 2015 on Fondaparinux, NSTEMI in 2015, subclavian stenosis s/p stent placement, HTN, HLD, PPM/AICD presented to the hospital for a wound on the calf of the left lower extremity. During hospital stay developed new RUE hemorrhagic dermatitis suspected from ?Arixtra. BURN Consult requested for skin biopsy.     Vital Signs Last 24 Hrs  T(C): 36.5 (13 May 2022 14:42), Max: 36.5 (13 May 2022 14:42)  T(F): 97.7 (13 May 2022 14:42), Max: 97.7 (13 May 2022 14:42)  HR: 76 (13 May 2022 17:41) (74 - 86)  BP: 135/77 (13 May 2022 17:41) (111/53 - 140/66)  BP(mean): 99 (13 May 2022 14:42) (99 - 99)  RR: 18 (13 May 2022 17:41) (18 - 19)  SpO2: 98% (13 May 2022 14:42) (96% - 98%)    Allergies  adhesives (Rash; Urticaria)  Arava (Anaphylaxis; Angioedema)  Avelox (Other)  Plaquenil (Other)  Vantin (Other (Mild))      Lab Results:                        14.5   8.51  )-----------( 210      ( 13 May 2022 06:15 )             42.5     05-13    136  |  101  |  14  ----------------------------<  112<H>  4.6   |  23  |  0.9      MEDICATIONS  (STANDING):  albuterol/ipratropium for Nebulization 3 milliLiter(s) Nebulizer every 6 hours  aspirin enteric coated 81 milliGRAM(s) Oral daily  budesonide 160 MICROgram(s)/formoterol 4.5 MICROgram(s) Inhaler 2 Puff(s) Inhalation two times a day  cyanocobalamin 1000 MICROGram(s) Oral daily  folic acid 1 milliGRAM(s) Oral daily  hydrALAZINE 50 milliGRAM(s) Oral two times a day  lidocaine 1%/epinephrine 1:100,000 Inj 20 milliLiter(s) Local Injection once  metoprolol succinate  milliGRAM(s) Oral daily  morphine ER Tablet 120 milliGRAM(s) Oral <User Schedule>    MEDICATIONS  (PRN):  acetaminophen     Tablet .. 650 milliGRAM(s) Oral every 6 hours PRN Temp greater or equal to 38C (100.4F), Mild Pain (1 - 3)  ALPRAZolam 1 milliGRAM(s) Oral every 6 hours PRN anxiety  aluminum hydroxide/magnesium hydroxide/simethicone Suspension 30 milliLiter(s) Oral every 4 hours PRN Dyspepsia  melatonin 3 milliGRAM(s) Oral at bedtime PRN Insomnia  ondansetron    Tablet 4 milliGRAM(s) Oral every 8 hours PRN Nausea and/or Vomiting  simethicone 80 milliGRAM(s) Chew two times a day PRN Indigestion    PHYSICAL EXAM:  GENERAL: seen on bedside, alert, oriented, cooperative, not in distress  SKIN: RUE scattered bullous hemorrhagic lesions, skin biopsy done on bedside, sutures applied, dressing applied.   Pt tolerated well.

## 2022-05-13 NOTE — CONSULT NOTE ADULT - ATTENDING COMMENTS
Patient also seen and examined by myself. I agree with Dr. Alford's (Hem-Onc fellow) note above. Situation discussed with him, the patient and the medical house staff. All questions answered.

## 2022-05-13 NOTE — CONSULT NOTE ADULT - SUBJECTIVE AND OBJECTIVE BOX
Patient is a 51y old  Female who presents with a chief complaint of left lower extremity wound (13 May 2022 09:32)      HPI:  51 year old woman with PMH of SLE  lupus with lupus anticoagulant, antiphospholipid syndrome,  rheumatoid arthritis, cervical cancer sp hysterectomy,  laryngeal cancer sp CT, RT, CHF s/p AICD+PPM, DVT/PE in 2015 on Fondaparinux, NSTEMI in 2015, subclavian stenosis s/p stent placement, HTN, HLD, PPM/AICD presented to the hospital for a wound on the calf of the left lower extremity. pt has had this wound since last week with swelling of the calf, erythema, fever chills. She's been taking Augmentin for the last 6 days but has been worsening with black eschar. pt went to Dr Guan who performed a duplex, was negative  in the ED,pt's VS T(C): 36.8 (05-05-22 @ 14:33), Max: 36.8 (05-05-22 @ 14:33)  HR: 87 (05-05-22 @ 15:58) (87 - 114)  BP: 138/67 (05-05-22 @ 15:58) (138/67 - 142/91)  RR: 18 (05-05-22 @ 14:33) (18 - 18)  SpO2: 96% (05-05-22 @ 14:33) (96% - 96%), labs done were unremarkable. Duplex done as outpatient was negative.   pt received aztreonam, metronidazole, Vancomycin.   pt is admitted for workup/management (05 May 2022 20:56)       ROS negative except for the above.     PAST MEDICAL & SURGICAL HISTORY:  Lupus      RA (rheumatoid arthritis)      HTN (hypertension)      CHF (congestive heart failure)      COPD (chronic obstructive pulmonary disease)      DVT (deep venous thrombosis)      Pulmonary embolism      History of laryngeal cancer      GERD (gastroesophageal reflux disease)      Cervical cancer      S/P appendectomy      S/P cholecystectomy      AICD (automatic cardioverter/defibrillator) present      S/P hysterectomy      History of back surgery      H/O foot surgery      S/P eye surgery      Subclavian arterial stenosis          SOCIAL HISTORY:    FAMILY HISTORY:  Family history of cervical cancer    FH: thyroid cancer        MEDICATIONS  (STANDING):  albuterol/ipratropium for Nebulization 3 milliLiter(s) Nebulizer every 6 hours  aspirin enteric coated 81 milliGRAM(s) Oral daily  budesonide 160 MICROgram(s)/formoterol 4.5 MICROgram(s) Inhaler 2 Puff(s) Inhalation two times a day  cyanocobalamin 1000 MICROGram(s) Oral daily  folic acid 1 milliGRAM(s) Oral daily  hydrALAZINE 50 milliGRAM(s) Oral two times a day  lidocaine 1%/epinephrine 1:100,000 Inj 20 milliLiter(s) Local Injection once  metoprolol succinate  milliGRAM(s) Oral daily  morphine ER Tablet 120 milliGRAM(s) Oral <User Schedule>    MEDICATIONS  (PRN):  acetaminophen     Tablet .. 650 milliGRAM(s) Oral every 6 hours PRN Temp greater or equal to 38C (100.4F), Mild Pain (1 - 3)  ALPRAZolam 1 milliGRAM(s) Oral every 6 hours PRN anxiety  aluminum hydroxide/magnesium hydroxide/simethicone Suspension 30 milliLiter(s) Oral every 4 hours PRN Dyspepsia  melatonin 3 milliGRAM(s) Oral at bedtime PRN Insomnia  ondansetron    Tablet 4 milliGRAM(s) Oral every 8 hours PRN Nausea and/or Vomiting  simethicone 80 milliGRAM(s) Chew two times a day PRN Indigestion      Allergies    adhesives (Rash; Urticaria)  Arava (Anaphylaxis; Angioedema)  Avelox (Other)  Plaquenil (Other)  Vantin (Other (Mild))    Intolerances        Vital Signs Last 24 Hrs  T(C): 36.5 (13 May 2022 14:42), Max: 36.5 (13 May 2022 14:42)  T(F): 97.7 (13 May 2022 14:42), Max: 97.7 (13 May 2022 14:42)  HR: 82 (13 May 2022 14:42) (74 - 86)  BP: 140/66 (13 May 2022 14:42) (111/53 - 140/66)  BP(mean): 99 (13 May 2022 14:42) (99 - 99)  RR: 18 (13 May 2022 14:42) (18 - 19)  SpO2: 98% (13 May 2022 14:42) (96% - 98%)    PHYSICAL EXAM  General: NAD  HEENT: NCAT, EOMI  Neck: supple  CV: Regular rate and rhythm  Lungs: Clear to ascultation bilaterally, no respiratory distress  Abdomen: soft non-tender non-distended  Ext: No edema  Skin: no rashes and no petechiae  Neuro: alert and oriented, no focal deficits      LABS:                          14.5   8.51  )-----------( 210      ( 13 May 2022 06:15 )             42.5         Mean Cell Volume : 86.0 fL  Mean Cell Hemoglobin : 29.4 pg  Mean Cell Hemoglobin Concentration : 34.1 g/dL  Auto Neutrophil # : 4.23 K/uL  Auto Lymphocyte # : 3.19 K/uL  Auto Monocyte # : 0.70 K/uL  Auto Eosinophil # : 0.31 K/uL  Auto Basophil # : 0.06 K/uL  Auto Neutrophil % : 49.8 %  Auto Lymphocyte % : 37.5 %  Auto Monocyte % : 8.2 %  Auto Eosinophil % : 3.6 %  Auto Basophil % : 0.7 %      Serial CBC's  05-13 @ 06:15  Hct-42.5 / Hgb-14.5 / Plat-210 / RBC-4.94 / WBC-8.51  Serial CBC's  05-11 @ 07:12  Hct-45.9 / Hgb-15.5 / Plat-238 / RBC-5.31 / WBC-9.95  Serial CBC's  05-10 @ 08:23  Hct-42.8 / Hgb-14.6 / Plat-183 / RBC-4.97 / WBC-6.09      05-13    136  |  101  |  14  ----------------------------<  112<H>  4.6   |  23  |  0.9    Ca    10.0      13 May 2022 06:15  Phos  3.7     05-13  Mg     1.8     05-13    TPro  7.2  /  Alb  4.5  /  TBili  0.5  /  DBili  x   /  AST  17  /  ALT  13  /  AlkPhos  75  05-13                      BLOOD SMEAR INTERPRETATION:       RADIOLOGY & ADDITIONAL STUDIES:     Patient is a 51y old  Female who presents with a chief complaint of left lower extremity wound (13 May 2022 09:32)      HPI:  51 year old woman with PMH of SLE  lupus with lupus anticoagulant, antiphospholipid syndrome,  rheumatoid arthritis, cervical cancer sp hysterectomy,  laryngeal cancer sp CT, RT, CHF s/p AICD+PPM, DVT/PE in 2015 on Fondaparinux, NSTEMI in 2015, subclavian stenosis s/p stent placement, HTN, HLD, PPM/AICD presented to the hospital for a wound on the calf of the left lower extremity. pt has had this wound since last week with swelling of the calf, erythema, fever chills. She's been taking Augmentin for the last 6 days but has been worsening with black eschar. pt went to     Presented for calf wound, treated with clindamycin  She was trialed on fondaparinux how developed the hemorrhagic bullae  She had one of the lesions biopsed by burn        ROS negative except for the above.     PAST MEDICAL & SURGICAL HISTORY:  Lupus      RA (rheumatoid arthritis)      HTN (hypertension)      CHF (congestive heart failure)      COPD (chronic obstructive pulmonary disease)      DVT (deep venous thrombosis)      Pulmonary embolism      History of laryngeal cancer      GERD (gastroesophageal reflux disease)      Cervical cancer      S/P appendectomy      S/P cholecystectomy      AICD (automatic cardioverter/defibrillator) present      S/P hysterectomy      History of back surgery      H/O foot surgery      S/P eye surgery      Subclavian arterial stenosis          SOCIAL HISTORY:    FAMILY HISTORY:  Family history of cervical cancer    FH: thyroid cancer        MEDICATIONS  (STANDING):  albuterol/ipratropium for Nebulization 3 milliLiter(s) Nebulizer every 6 hours  aspirin enteric coated 81 milliGRAM(s) Oral daily  budesonide 160 MICROgram(s)/formoterol 4.5 MICROgram(s) Inhaler 2 Puff(s) Inhalation two times a day  cyanocobalamin 1000 MICROGram(s) Oral daily  folic acid 1 milliGRAM(s) Oral daily  hydrALAZINE 50 milliGRAM(s) Oral two times a day  lidocaine 1%/epinephrine 1:100,000 Inj 20 milliLiter(s) Local Injection once  metoprolol succinate  milliGRAM(s) Oral daily  morphine ER Tablet 120 milliGRAM(s) Oral <User Schedule>    MEDICATIONS  (PRN):  acetaminophen     Tablet .. 650 milliGRAM(s) Oral every 6 hours PRN Temp greater or equal to 38C (100.4F), Mild Pain (1 - 3)  ALPRAZolam 1 milliGRAM(s) Oral every 6 hours PRN anxiety  aluminum hydroxide/magnesium hydroxide/simethicone Suspension 30 milliLiter(s) Oral every 4 hours PRN Dyspepsia  melatonin 3 milliGRAM(s) Oral at bedtime PRN Insomnia  ondansetron    Tablet 4 milliGRAM(s) Oral every 8 hours PRN Nausea and/or Vomiting  simethicone 80 milliGRAM(s) Chew two times a day PRN Indigestion      Allergies    adhesives (Rash; Urticaria)  Arava (Anaphylaxis; Angioedema)  Avelox (Other)  Plaquenil (Other)  Vantin (Other (Mild))    Intolerances        Vital Signs Last 24 Hrs  T(C): 36.5 (13 May 2022 14:42), Max: 36.5 (13 May 2022 14:42)  T(F): 97.7 (13 May 2022 14:42), Max: 97.7 (13 May 2022 14:42)  HR: 82 (13 May 2022 14:42) (74 - 86)  BP: 140/66 (13 May 2022 14:42) (111/53 - 140/66)  BP(mean): 99 (13 May 2022 14:42) (99 - 99)  RR: 18 (13 May 2022 14:42) (18 - 19)  SpO2: 98% (13 May 2022 14:42) (96% - 98%)    PHYSICAL EXAM  General: NAD  HEENT: NCAT, EOMI  Neck: supple  CV: Regular rate and rhythm  Lungs: Clear to ascultation bilaterally, no respiratory distress  Abdomen: soft non-tender non-distended  Ext: LLE infection/bruising ; UE hemorrhagic bullae  Skin: no rashes and no petechiae  Neuro: alert and oriented, no focal deficits      LABS:                          14.5   8.51  )-----------( 210      ( 13 May 2022 06:15 )             42.5         Mean Cell Volume : 86.0 fL  Mean Cell Hemoglobin : 29.4 pg  Mean Cell Hemoglobin Concentration : 34.1 g/dL  Auto Neutrophil # : 4.23 K/uL  Auto Lymphocyte # : 3.19 K/uL  Auto Monocyte # : 0.70 K/uL  Auto Eosinophil # : 0.31 K/uL  Auto Basophil # : 0.06 K/uL  Auto Neutrophil % : 49.8 %  Auto Lymphocyte % : 37.5 %  Auto Monocyte % : 8.2 %  Auto Eosinophil % : 3.6 %  Auto Basophil % : 0.7 %      Serial CBC's  05-13 @ 06:15  Hct-42.5 / Hgb-14.5 / Plat-210 / RBC-4.94 / WBC-8.51  Serial CBC's  05-11 @ 07:12  Hct-45.9 / Hgb-15.5 / Plat-238 / RBC-5.31 / WBC-9.95  Serial CBC's  05-10 @ 08:23  Hct-42.8 / Hgb-14.6 / Plat-183 / RBC-4.97 / WBC-6.09      05-13    136  |  101  |  14  ----------------------------<  112<H>  4.6   |  23  |  0.9    Ca    10.0      13 May 2022 06:15  Phos  3.7     05-13  Mg     1.8     05-13    TPro  7.2  /  Alb  4.5  /  TBili  0.5  /  DBili  x   /  AST  17  /  ALT  13  /  AlkPhos  75  05-13                      BLOOD SMEAR INTERPRETATION:       RADIOLOGY & ADDITIONAL STUDIES:     Patient is a 51y old  Female who presents with a chief complaint of left lower extremity wound (13 May 2022 09:32)      HPI:  51 year old woman with PMH of SLE, lupus with lupus anticoagulant, antiphospholipid syndrome,  rheumatoid arthritis, cervical cancer sp hysterectomy,  laryngeal cancer sp CT, RT, CHF s/p AICD+PPM, DVT/PE in 2015, recently on Fondaparinux because of either intolerance or failure of previous drugs (Coumadin, NOACs, Lovenox),  NSTEMI in 2015, subclavian stenosis s/p stent placement, HTN, HLD, PPM/AICD presented to the hospital for a wound on the calf of the left lower extremity. Pt has had this wound since last week eventually leading to swelling of the calf, erythema, fever, chills. She's been taking Augmentin for the last 6 days but has been worsening with black eschar. Pt went to the ER and got admitted for further management.    Presented for calf wound, treated with clindamycin with good response  She was tried on fondaparinux how developed small hemorrhagic/necrotic bullae  She had one of the lesions biopsied by burn        ROS negative except for the above.     PAST MEDICAL & SURGICAL HISTORY:  Lupus      RA (rheumatoid arthritis)      HTN (hypertension)      CHF (congestive heart failure)      COPD (chronic obstructive pulmonary disease)      DVT (deep venous thrombosis)      Pulmonary embolism      History of laryngeal cancer      GERD (gastroesophageal reflux disease)      Cervical cancer      S/P appendectomy      S/P cholecystectomy      AICD (automatic cardioverter/defibrillator) present      S/P hysterectomy      History of back surgery      H/O foot surgery      S/P eye surgery      Subclavian arterial stenosis    Still smoking          SOCIAL HISTORY:    FAMILY HISTORY:  Family history of cervical cancer    FH: thyroid cancer        MEDICATIONS  (STANDING):  albuterol/ipratropium for Nebulization 3 milliLiter(s) Nebulizer every 6 hours  aspirin enteric coated 81 milliGRAM(s) Oral daily  budesonide 160 MICROgram(s)/formoterol 4.5 MICROgram(s) Inhaler 2 Puff(s) Inhalation two times a day  cyanocobalamin 1000 MICROGram(s) Oral daily  folic acid 1 milliGRAM(s) Oral daily  hydrALAZINE 50 milliGRAM(s) Oral two times a day  lidocaine 1%/epinephrine 1:100,000 Inj 20 milliLiter(s) Local Injection once  metoprolol succinate  milliGRAM(s) Oral daily  morphine ER Tablet 120 milliGRAM(s) Oral <User Schedule>    MEDICATIONS  (PRN):  acetaminophen     Tablet .. 650 milliGRAM(s) Oral every 6 hours PRN Temp greater or equal to 38C (100.4F), Mild Pain (1 - 3)  ALPRAZolam 1 milliGRAM(s) Oral every 6 hours PRN anxiety  aluminum hydroxide/magnesium hydroxide/simethicone Suspension 30 milliLiter(s) Oral every 4 hours PRN Dyspepsia  melatonin 3 milliGRAM(s) Oral at bedtime PRN Insomnia  ondansetron    Tablet 4 milliGRAM(s) Oral every 8 hours PRN Nausea and/or Vomiting  simethicone 80 milliGRAM(s) Chew two times a day PRN Indigestion      Allergies    adhesives (Rash; Urticaria)  Arava (Anaphylaxis; Angioedema)  Avelox (Other)  Plaquenil (Other)  Vantin (Other (Mild))    Intolerances        Vital Signs Last 24 Hrs  T(C): 36.5 (13 May 2022 14:42), Max: 36.5 (13 May 2022 14:42)  T(F): 97.7 (13 May 2022 14:42), Max: 97.7 (13 May 2022 14:42)  HR: 82 (13 May 2022 14:42) (74 - 86)  BP: 140/66 (13 May 2022 14:42) (111/53 - 140/66)  BP(mean): 99 (13 May 2022 14:42) (99 - 99)  RR: 18 (13 May 2022 14:42) (18 - 19)  SpO2: 98% (13 May 2022 14:42) (96% - 98%)    PHYSICAL EXAM  General: NAD  HEENT: NCAT, EOMI  Neck: supple  CV: Regular rate and rhythm  Lungs: Clear to ascultation bilaterally, no respiratory distress  Abdomen: soft non-tender non-distended  Ext: LLE infection/bruising ; UE hemorrhagic bullae  Skin: no rashes and no petechiae. A few subcutaneous soft nodules on arms and legs  Neuro: alert and oriented, no focal deficits      LABS:                          14.5   8.51  )-----------( 210      ( 13 May 2022 06:15 )             42.5         Mean Cell Volume : 86.0 fL  Mean Cell Hemoglobin : 29.4 pg  Mean Cell Hemoglobin Concentration : 34.1 g/dL  Auto Neutrophil # : 4.23 K/uL  Auto Lymphocyte # : 3.19 K/uL  Auto Monocyte # : 0.70 K/uL  Auto Eosinophil # : 0.31 K/uL  Auto Basophil # : 0.06 K/uL  Auto Neutrophil % : 49.8 %  Auto Lymphocyte % : 37.5 %  Auto Monocyte % : 8.2 %  Auto Eosinophil % : 3.6 %  Auto Basophil % : 0.7 %      Serial CBC's  05-13 @ 06:15  Hct-42.5 / Hgb-14.5 / Plat-210 / RBC-4.94 / WBC-8.51  Serial CBC's  05-11 @ 07:12  Hct-45.9 / Hgb-15.5 / Plat-238 / RBC-5.31 / WBC-9.95  Serial CBC's  05-10 @ 08:23  Hct-42.8 / Hgb-14.6 / Plat-183 / RBC-4.97 / WBC-6.09      05-13    136  |  101  |  14  ----------------------------<  112<H>  4.6   |  23  |  0.9    Ca    10.0      13 May 2022 06:15  Phos  3.7     05-13  Mg     1.8     05-13    T Pro  7.2  /  Alb  4.5  /  T Bili  0.5  /  D Bili  x   /  AST  17  /  ALT  13  /  Alk Phos  75  05-13                      BLOOD SMEAR INTERPRETATION:       RADIOLOGY & ADDITIONAL STUDIES:

## 2022-05-13 NOTE — PROGRESS NOTE ADULT - SUBJECTIVE AND OBJECTIVE BOX
Hospital Day:  8d    Subjective:    No acute events overnight.    Chart reviewed, patient seen and examined at bedside in AM. Patient appears comfortable while at rest in bed. No other complaints.     Denies headaches, changes in vision, chest pains, SOB, n/v/d, abd pain, constipation, changes in urination, pain on urination, weakness, fatigue, joint pain or muscle pain.       Past Medical Hx:   Lupus    AICD (automatic cardioverter/defibrillator) present    Throat cancer    RA (rheumatoid arthritis)    HTN (hypertension)    CHF (congestive heart failure)    COPD (chronic obstructive pulmonary disease)    DVT (deep venous thrombosis)    Pulmonary embolus    Pulmonary embolism    History of laryngeal cancer    GERD (gastroesophageal reflux disease)    Cervical cancer      Past Sx:  S/P appendectomy    S/P cholecystectomy    History of laryngeal cancer    AICD (automatic cardioverter/defibrillator) present    S/P hysterectomy    History of back surgery    H/O foot surgery    S/P eye surgery    Subclavian arterial stenosis      Allergies:  adhesives (Rash; Urticaria)  Arava (Anaphylaxis; Angioedema)  Avelox (Other)  Plaquenil (Other)  Vantin (Other (Mild))    Current Meds:   Standng Meds:  albuterol/ipratropium for Nebulization 3 milliLiter(s) Nebulizer every 6 hours  aspirin enteric coated 81 milliGRAM(s) Oral daily  budesonide 160 MICROgram(s)/formoterol 4.5 MICROgram(s) Inhaler 2 Puff(s) Inhalation two times a day  cyanocobalamin 1000 MICROGram(s) Oral daily  folic acid 1 milliGRAM(s) Oral daily  hydrALAZINE 50 milliGRAM(s) Oral two times a day  metoprolol succinate  milliGRAM(s) Oral daily  morphine ER Tablet 120 milliGRAM(s) Oral <User Schedule>    PRN Meds:  acetaminophen     Tablet .. 650 milliGRAM(s) Oral every 6 hours PRN Temp greater or equal to 38C (100.4F), Mild Pain (1 - 3)  ALPRAZolam 1 milliGRAM(s) Oral every 6 hours PRN anxiety  aluminum hydroxide/magnesium hydroxide/simethicone Suspension 30 milliLiter(s) Oral every 4 hours PRN Dyspepsia  melatonin 3 milliGRAM(s) Oral at bedtime PRN Insomnia  ondansetron    Tablet 4 milliGRAM(s) Oral every 8 hours PRN Nausea and/or Vomiting  simethicone 80 milliGRAM(s) Chew two times a day PRN Indigestion    HOME MEDICATIONS:  albuterol 90 mcg/inh inhalation aerosol: 2 puff(s) inhaled every 6 hours, As Needed  ALPRAZolam 1 mg oral tablet: 2 tab(s) orally 3 times a day, As Needed  cyanocobalamin 1000 mcg/mL injectable solution: 1 milliliter(s) injectable once a week  folic acid 1 mg oral tablet: 1 tab(s) orally once a day  fondaparinux 7.5 mg/0.6 mL subcutaneous solution: 0.6 milliliter(s) subcutaneous once a day  hydrALAZINE 50 mg oral tablet: 1 tab(s) orally 2 times a day  Metoprolol Succinate  mg oral tablet, extended release: 1 tab(s) orally once a day  MS Contin 60 mg oral tablet, extended release: 2 tab(s) orally 3 times a day (after meals), As Needed  Trelegy Ellipta 100 mcg-62.5 mcg-25 mcg/inh inhalation powder: 1 puff(s) inhaled once a day      Vital Signs:   T(F): 96.9 (05-13-22 @ 04:35), Max: 97.6 (05-12-22 @ 20:59)  HR: 74 (05-13-22 @ 04:35) (74 - 86)  BP: 111/53 (05-13-22 @ 04:35) (111/53 - 123/58)  RR: 19 (05-13-22 @ 04:35) (18 - 19)  SpO2: 96% (05-13-22 @ 05:57) (96% - 96%)        Physical Exam:   CONSTITUTIONAL: Well-developed; well-nourished; in no acute distress, speaking in full sentences  HEAD: Normocephalic; atraumatic  EYES: PERRL, EOMI, no conjunctival erythema  NECK: Supple; non tender, FROM  CARD: +S1, S2 Regular rate and rhythm.  RESP: CTABL  ABD: soft nontender, nondistended, no rebound, no guarding, no rigidity  EXT: moves all extremities,  No clubbing, cyanosis or edema.   NEURO: Alert, oriented, grossly unremarkable, no focal deficits, cn ii-xii grossly intact  PSYCH: Cooperative, appropriate         Labs:                         15.5   9.95  )-----------( 238      ( 11 May 2022 07:12 )             45.9       11 May 2022 14:39    135    |  94     |  14     ----------------------------<  237    4.1     |  26     |  1.1      Ca    9.8        11 May 2022 14:39  Phos  4.8       11 May 2022 07:12  Mg     2.0       11 May 2022 07:12    TPro  7.1    /  Alb  4.6    /  TBili  0.4    /  DBili  x      /  AST  16     /  ALT  16     /  AlkPhos  88     11 May 2022 14:39              Troponin <0.01, CKMB --, CK -- 05-11-22 @ 12:21              Culture - Blood (collected 05-06-22 @ 07:53)  Source: .Blood Blood  Final Report (05-11-22 @ 15:05):    No Growth Final        =============    CAPILLARY BLOOD GLUCOSE        CARDIAC MARKERS ( 11 May 2022 12:21 )  x     / <0.01 ng/mL / x     / x     / x          LIVER FUNCTIONS - ( 11 May 2022 14:39 )  Alb: 4.6 g/dL / Pro: 7.1 g/dL / ALK PHOS: 88 U/L / ALT: 16 U/L / AST: 16 U/L / GGT: x               Radiology:   IMAGING:             Hospital Day:  8d    Subjective:    No acute events overnight.    Chart reviewed, patient seen and examined at bedside in AM. Patient appears comfortable while at rest in bed. No other complaints.     ROS neg    Past Medical Hx:   Lupus    AICD (automatic cardioverter/defibrillator) present    Throat cancer    RA (rheumatoid arthritis)    HTN (hypertension)    CHF (congestive heart failure)    COPD (chronic obstructive pulmonary disease)    DVT (deep venous thrombosis)    Pulmonary embolus    Pulmonary embolism    History of laryngeal cancer    GERD (gastroesophageal reflux disease)    Cervical cancer      Past Sx:  S/P appendectomy    S/P cholecystectomy    History of laryngeal cancer    AICD (automatic cardioverter/defibrillator) present    S/P hysterectomy    History of back surgery    H/O foot surgery    S/P eye surgery    Subclavian arterial stenosis      Allergies:  adhesives (Rash; Urticaria)  Arava (Anaphylaxis; Angioedema)  Avelox (Other)  Plaquenil (Other)  Vantin (Other (Mild))    Current Meds:   Standng Meds:  albuterol/ipratropium for Nebulization 3 milliLiter(s) Nebulizer every 6 hours  aspirin enteric coated 81 milliGRAM(s) Oral daily  budesonide 160 MICROgram(s)/formoterol 4.5 MICROgram(s) Inhaler 2 Puff(s) Inhalation two times a day  cyanocobalamin 1000 MICROGram(s) Oral daily  folic acid 1 milliGRAM(s) Oral daily  hydrALAZINE 50 milliGRAM(s) Oral two times a day  metoprolol succinate  milliGRAM(s) Oral daily  morphine ER Tablet 120 milliGRAM(s) Oral <User Schedule>    PRN Meds:  acetaminophen     Tablet .. 650 milliGRAM(s) Oral every 6 hours PRN Temp greater or equal to 38C (100.4F), Mild Pain (1 - 3)  ALPRAZolam 1 milliGRAM(s) Oral every 6 hours PRN anxiety  aluminum hydroxide/magnesium hydroxide/simethicone Suspension 30 milliLiter(s) Oral every 4 hours PRN Dyspepsia  melatonin 3 milliGRAM(s) Oral at bedtime PRN Insomnia  ondansetron    Tablet 4 milliGRAM(s) Oral every 8 hours PRN Nausea and/or Vomiting  simethicone 80 milliGRAM(s) Chew two times a day PRN Indigestion    HOME MEDICATIONS:  albuterol 90 mcg/inh inhalation aerosol: 2 puff(s) inhaled every 6 hours, As Needed  ALPRAZolam 1 mg oral tablet: 2 tab(s) orally 3 times a day, As Needed  cyanocobalamin 1000 mcg/mL injectable solution: 1 milliliter(s) injectable once a week  folic acid 1 mg oral tablet: 1 tab(s) orally once a day  fondaparinux 7.5 mg/0.6 mL subcutaneous solution: 0.6 milliliter(s) subcutaneous once a day  hydrALAZINE 50 mg oral tablet: 1 tab(s) orally 2 times a day  Metoprolol Succinate  mg oral tablet, extended release: 1 tab(s) orally once a day  MS Contin 60 mg oral tablet, extended release: 2 tab(s) orally 3 times a day (after meals), As Needed  Trelegy Ellipta 100 mcg-62.5 mcg-25 mcg/inh inhalation powder: 1 puff(s) inhaled once a day      Vital Signs:   T(F): 96.9 (05-13-22 @ 04:35), Max: 97.6 (05-12-22 @ 20:59)  HR: 74 (05-13-22 @ 04:35) (74 - 86)  BP: 111/53 (05-13-22 @ 04:35) (111/53 - 123/58)  RR: 19 (05-13-22 @ 04:35) (18 - 19)  SpO2: 96% (05-13-22 @ 05:57) (96% - 96%)        Physical Exam:   CONSTITUTIONAL: Well-developed; well-nourished; in no acute distress, speaking in full sentences  HEAD: Normocephalic; atraumatic  EYES: PERRL, EOMI, no conjunctival erythema  NECK: Supple; non tender, FROM  CARD: +S1, S2 Regular rate and rhythm.  RESP: CTABL  ABD: soft nontender, nondistended, no rebound, no guarding, no rigidity  EXT: moves all extremities,  No clubbing, cyanosis or edema.   NEURO: Alert, oriented, grossly unremarkable, no focal deficits, cn ii-xii grossly intact  PSYCH: Cooperative, appropriate         Labs:                         15.5   9.95  )-----------( 238      ( 11 May 2022 07:12 )             45.9       11 May 2022 14:39    135    |  94     |  14     ----------------------------<  237    4.1     |  26     |  1.1      Ca    9.8        11 May 2022 14:39  Phos  4.8       11 May 2022 07:12  Mg     2.0       11 May 2022 07:12    TPro  7.1    /  Alb  4.6    /  TBili  0.4    /  DBili  x      /  AST  16     /  ALT  16     /  AlkPhos  88     11 May 2022 14:39              Troponin <0.01, CKMB --, CK -- 05-11-22 @ 12:21              Culture - Blood (collected 05-06-22 @ 07:53)  Source: .Blood Blood  Final Report (05-11-22 @ 15:05):    No Growth Final        =============    CAPILLARY BLOOD GLUCOSE        CARDIAC MARKERS ( 11 May 2022 12:21 )  x     / <0.01 ng/mL / x     / x     / x          LIVER FUNCTIONS - ( 11 May 2022 14:39 )  Alb: 4.6 g/dL / Pro: 7.1 g/dL / ALK PHOS: 88 U/L / ALT: 16 U/L / AST: 16 U/L / GGT: x               Radiology:   IMAGING:             Hospital Day:  8d    Subjective:    No acute events overnight.    Chart reviewed, patient seen and examined at bedside in AM. Patient appears comfortable while at rest in bed. No other complaints.     ROS neg    Past Medical Hx:   Lupus    AICD (automatic cardioverter/defibrillator) present    Throat cancer    RA (rheumatoid arthritis)    HTN (hypertension)    CHF (congestive heart failure)    COPD (chronic obstructive pulmonary disease)    DVT (deep venous thrombosis)    Pulmonary embolus    Pulmonary embolism    History of laryngeal cancer    GERD (gastroesophageal reflux disease)    Cervical cancer      Past Sx:  S/P appendectomy    S/P cholecystectomy    History of laryngeal cancer    AICD (automatic cardioverter/defibrillator) present    S/P hysterectomy    History of back surgery    H/O foot surgery    S/P eye surgery    Subclavian arterial stenosis      Allergies:  adhesives (Rash; Urticaria)  Arava (Anaphylaxis; Angioedema)  Avelox (Other)  Plaquenil (Other)  Vantin (Other (Mild))    Current Meds:   Standng Meds:  albuterol/ipratropium for Nebulization 3 milliLiter(s) Nebulizer every 6 hours  aspirin enteric coated 81 milliGRAM(s) Oral daily  budesonide 160 MICROgram(s)/formoterol 4.5 MICROgram(s) Inhaler 2 Puff(s) Inhalation two times a day  cyanocobalamin 1000 MICROGram(s) Oral daily  folic acid 1 milliGRAM(s) Oral daily  hydrALAZINE 50 milliGRAM(s) Oral two times a day  metoprolol succinate  milliGRAM(s) Oral daily  morphine ER Tablet 120 milliGRAM(s) Oral <User Schedule>    PRN Meds:  acetaminophen     Tablet .. 650 milliGRAM(s) Oral every 6 hours PRN Temp greater or equal to 38C (100.4F), Mild Pain (1 - 3)  ALPRAZolam 1 milliGRAM(s) Oral every 6 hours PRN anxiety  aluminum hydroxide/magnesium hydroxide/simethicone Suspension 30 milliLiter(s) Oral every 4 hours PRN Dyspepsia  melatonin 3 milliGRAM(s) Oral at bedtime PRN Insomnia  ondansetron    Tablet 4 milliGRAM(s) Oral every 8 hours PRN Nausea and/or Vomiting  simethicone 80 milliGRAM(s) Chew two times a day PRN Indigestion    HOME MEDICATIONS:  albuterol 90 mcg/inh inhalation aerosol: 2 puff(s) inhaled every 6 hours, As Needed  ALPRAZolam 1 mg oral tablet: 2 tab(s) orally 3 times a day, As Needed  cyanocobalamin 1000 mcg/mL injectable solution: 1 milliliter(s) injectable once a week  folic acid 1 mg oral tablet: 1 tab(s) orally once a day  fondaparinux 7.5 mg/0.6 mL subcutaneous solution: 0.6 milliliter(s) subcutaneous once a day  hydrALAZINE 50 mg oral tablet: 1 tab(s) orally 2 times a day  Metoprolol Succinate  mg oral tablet, extended release: 1 tab(s) orally once a day  MS Contin 60 mg oral tablet, extended release: 2 tab(s) orally 3 times a day (after meals), As Needed  Trelegy Ellipta 100 mcg-62.5 mcg-25 mcg/inh inhalation powder: 1 puff(s) inhaled once a day      Vital Signs:   T(F): 96.9 (05-13-22 @ 04:35), Max: 97.6 (05-12-22 @ 20:59)  HR: 74 (05-13-22 @ 04:35) (74 - 86)  BP: 111/53 (05-13-22 @ 04:35) (111/53 - 123/58)  RR: 19 (05-13-22 @ 04:35) (18 - 19)  SpO2: 96% (05-13-22 @ 05:57) (96% - 96%)        Physical Exam:   General: NAD, AAO3  HEENT:  EOMI, no LAD  CV: S1 S2  Resp: decreased breath sounds at bases  GI: NT/ND/S +BS  MS: Lt calf decreased swelling. No erythema; initial pustule/bullae resolved; post calf +echymoses, small area of induration. New RUE bullar around 10mm w/ a few smaller bullae  Neuro: nonfocal, +reflexes thruout        Labs:                         15.5   9.95  )-----------( 238      ( 11 May 2022 07:12 )             45.9       11 May 2022 14:39    135    |  94     |  14     ----------------------------<  237    4.1     |  26     |  1.1      Ca    9.8        11 May 2022 14:39  Phos  4.8       11 May 2022 07:12  Mg     2.0       11 May 2022 07:12    TPro  7.1    /  Alb  4.6    /  TBili  0.4    /  DBili  x      /  AST  16     /  ALT  16     /  AlkPhos  88     11 May 2022 14:39              Troponin <0.01, CKMB --, CK -- 05-11-22 @ 12:21              Culture - Blood (collected 05-06-22 @ 07:53)  Source: .Blood Blood  Final Report (05-11-22 @ 15:05):    No Growth Final        =============    CAPILLARY BLOOD GLUCOSE        CARDIAC MARKERS ( 11 May 2022 12:21 )  x     / <0.01 ng/mL / x     / x     / x          LIVER FUNCTIONS - ( 11 May 2022 14:39 )  Alb: 4.6 g/dL / Pro: 7.1 g/dL / ALK PHOS: 88 U/L / ALT: 16 U/L / AST: 16 U/L / GGT: x               Radiology:   IMAGING:

## 2022-05-13 NOTE — CONSULT NOTE ADULT - ASSESSMENT
51 year old woman with PMH of SLE  lupus with lupus anticoagulant, antiphospholipid syndrome,  rheumatoid arthritis, cervical cancer sp hysterectomy,  laryngeal cancer sp CT, RT, CHF s/p AICD+PPM, DVT/PE in 2015 on Fondaparinux, NSTEMI in 2015, subclavian stenosis s/p stent placement, HTN, HLD, PPM/AICD presented to the hospital for a wound on the calf of the left lower extremity. pt has had this wound since last week with swelling of the calf, erythema, fever chills. She's been taking Augmentin for the last 6 days but has been worsening with black eschar. pt went to Dr Guan who performed a duplex, was negative    dermatology consulted for RUE crusted hemorrhagic dermatosis    impression:  - skin lesions suggestive of hemorrhagic papular eruption secondary to fondaparinux use  - f/u burn recommendations regarding skin biopsy to r/o underlying vasculitis especially in the setting of SLE and lupus anticoagulant   - patient switched to heparin SC by hematology team     ATTENDING NOTE TO FOLLOW

## 2022-05-14 LAB
BASOPHILS # BLD AUTO: 0.06 K/UL — SIGNIFICANT CHANGE UP (ref 0–0.2)
BASOPHILS NFR BLD AUTO: 0.8 % — SIGNIFICANT CHANGE UP (ref 0–1)
BLD GP AB SCN SERPL QL: SIGNIFICANT CHANGE UP
EOSINOPHIL # BLD AUTO: 0.2 K/UL — SIGNIFICANT CHANGE UP (ref 0–0.7)
EOSINOPHIL NFR BLD AUTO: 2.5 % — SIGNIFICANT CHANGE UP (ref 0–8)
HCT VFR BLD CALC: 40 % — SIGNIFICANT CHANGE UP (ref 37–47)
HGB BLD-MCNC: 13.2 G/DL — SIGNIFICANT CHANGE UP (ref 12–16)
IMM GRANULOCYTES NFR BLD AUTO: 0.5 % — HIGH (ref 0.1–0.3)
LYMPHOCYTES # BLD AUTO: 2.42 K/UL — SIGNIFICANT CHANGE UP (ref 1.2–3.4)
LYMPHOCYTES # BLD AUTO: 30.3 % — SIGNIFICANT CHANGE UP (ref 20.5–51.1)
MCHC RBC-ENTMCNC: 28.9 PG — SIGNIFICANT CHANGE UP (ref 27–31)
MCHC RBC-ENTMCNC: 33 G/DL — SIGNIFICANT CHANGE UP (ref 32–37)
MCV RBC AUTO: 87.5 FL — SIGNIFICANT CHANGE UP (ref 81–99)
MONOCYTES # BLD AUTO: 0.61 K/UL — HIGH (ref 0.1–0.6)
MONOCYTES NFR BLD AUTO: 7.6 % — SIGNIFICANT CHANGE UP (ref 1.7–9.3)
NEUTROPHILS # BLD AUTO: 4.66 K/UL — SIGNIFICANT CHANGE UP (ref 1.4–6.5)
NEUTROPHILS NFR BLD AUTO: 58.3 % — SIGNIFICANT CHANGE UP (ref 42.2–75.2)
NRBC # BLD: 0 /100 WBCS — SIGNIFICANT CHANGE UP (ref 0–0)
PLATELET # BLD AUTO: 199 K/UL — SIGNIFICANT CHANGE UP (ref 130–400)
RBC # BLD: 4.57 M/UL — SIGNIFICANT CHANGE UP (ref 4.2–5.4)
RBC # FLD: 13.1 % — SIGNIFICANT CHANGE UP (ref 11.5–14.5)
WBC # BLD: 7.99 K/UL — SIGNIFICANT CHANGE UP (ref 4.8–10.8)
WBC # FLD AUTO: 7.99 K/UL — SIGNIFICANT CHANGE UP (ref 4.8–10.8)

## 2022-05-14 PROCEDURE — 99233 SBSQ HOSP IP/OBS HIGH 50: CPT

## 2022-05-14 RX ORDER — MORPHINE SULFATE 50 MG/1
120 CAPSULE, EXTENDED RELEASE ORAL
Refills: 0 | Status: DISCONTINUED | OUTPATIENT
Start: 2022-05-14 | End: 2022-05-15

## 2022-05-14 RX ADMIN — Medication 50 MILLIGRAM(S): at 05:30

## 2022-05-14 RX ADMIN — Medication 100 MILLIGRAM(S): at 05:30

## 2022-05-14 RX ADMIN — Medication 1 MILLIGRAM(S): at 13:15

## 2022-05-14 RX ADMIN — MORPHINE SULFATE 120 MILLIGRAM(S): 50 CAPSULE, EXTENDED RELEASE ORAL at 06:05

## 2022-05-14 RX ADMIN — MORPHINE SULFATE 120 MILLIGRAM(S): 50 CAPSULE, EXTENDED RELEASE ORAL at 21:09

## 2022-05-14 RX ADMIN — MORPHINE SULFATE 120 MILLIGRAM(S): 50 CAPSULE, EXTENDED RELEASE ORAL at 14:40

## 2022-05-14 RX ADMIN — PREGABALIN 1000 MICROGRAM(S): 225 CAPSULE ORAL at 13:15

## 2022-05-14 RX ADMIN — Medication 50 MILLIGRAM(S): at 17:19

## 2022-05-14 RX ADMIN — Medication 81 MILLIGRAM(S): at 13:15

## 2022-05-14 NOTE — PROGRESS NOTE ADULT - SUBJECTIVE AND OBJECTIVE BOX
Hospital Day:  9d    Subjective:    No acute events overnight.    Chart reviewed, patient seen and examined at bedside in AM. Patient appears comfortable while at rest in bed. No other complaints.     Denies headaches, changes in vision, chest pains, SOB, n/v/d, abd pain, constipation, changes in urination, pain on urination, weakness, fatigue, joint pain or muscle pain.       Past Medical Hx:   Lupus    AICD (automatic cardioverter/defibrillator) present    Throat cancer    RA (rheumatoid arthritis)    HTN (hypertension)    CHF (congestive heart failure)    COPD (chronic obstructive pulmonary disease)    DVT (deep venous thrombosis)    Pulmonary embolus    Pulmonary embolism    History of laryngeal cancer    GERD (gastroesophageal reflux disease)    Cervical cancer      Past Sx:  S/P appendectomy    S/P cholecystectomy    History of laryngeal cancer    AICD (automatic cardioverter/defibrillator) present    S/P hysterectomy    History of back surgery    H/O foot surgery    S/P eye surgery    Subclavian arterial stenosis      Allergies:  adhesives (Rash; Urticaria)  Arava (Anaphylaxis; Angioedema)  Avelox (Other)  Plaquenil (Other)  Vantin (Other (Mild))    Current Meds:   Standng Meds:  albuterol/ipratropium for Nebulization 3 milliLiter(s) Nebulizer every 6 hours  aspirin enteric coated 81 milliGRAM(s) Oral daily  budesonide 160 MICROgram(s)/formoterol 4.5 MICROgram(s) Inhaler 2 Puff(s) Inhalation two times a day  cyanocobalamin 1000 MICROGram(s) Oral daily  folic acid 1 milliGRAM(s) Oral daily  heparin   Injectable 90585 Unit(s) SubCutaneous every 12 hours  hydrALAZINE 50 milliGRAM(s) Oral two times a day  lidocaine 1%/epinephrine 1:100,000 Inj 20 milliLiter(s) Local Injection once  metoprolol succinate  milliGRAM(s) Oral daily  morphine ER Tablet 120 milliGRAM(s) Oral <User Schedule>    PRN Meds:  acetaminophen     Tablet .. 650 milliGRAM(s) Oral every 6 hours PRN Temp greater or equal to 38C (100.4F), Mild Pain (1 - 3)  aluminum hydroxide/magnesium hydroxide/simethicone Suspension 30 milliLiter(s) Oral every 4 hours PRN Dyspepsia  melatonin 3 milliGRAM(s) Oral at bedtime PRN Insomnia  ondansetron    Tablet 4 milliGRAM(s) Oral every 8 hours PRN Nausea and/or Vomiting  simethicone 80 milliGRAM(s) Chew two times a day PRN Indigestion    HOME MEDICATIONS:  albuterol 90 mcg/inh inhalation aerosol: 2 puff(s) inhaled every 6 hours, As Needed  ALPRAZolam 1 mg oral tablet: 2 tab(s) orally 3 times a day, As Needed  cyanocobalamin 1000 mcg/mL injectable solution: 1 milliliter(s) injectable once a week  folic acid 1 mg oral tablet: 1 tab(s) orally once a day  hydrALAZINE 50 mg oral tablet: 1 tab(s) orally 2 times a day  Metoprolol Succinate  mg oral tablet, extended release: 1 tab(s) orally once a day  MS Contin 60 mg oral tablet, extended release: 2 tab(s) orally 3 times a day (after meals), As Needed  Trelegy Ellipta 100 mcg-62.5 mcg-25 mcg/inh inhalation powder: 1 puff(s) inhaled once a day      Vital Signs:   T(F): 96.8 (05-14-22 @ 05:08), Max: 98.6 (05-13-22 @ 20:13)  HR: 75 (05-14-22 @ 05:08) (68 - 82)  BP: 136/63 (05-14-22 @ 05:08) (135/77 - 140/66)  RR: 18 (05-14-22 @ 05:08) (18 - 18)  SpO2: 96% (05-13-22 @ 20:13) (96% - 98%)        Physical Exam:   CONSTITUTIONAL: Well-developed; well-nourished; in no acute distress, speaking in full sentences  HEAD: Normocephalic; atraumatic  EYES: PERRL, EOMI, no conjunctival erythema  NECK: Supple; non tender, FROM  CARD: +S1, S2 Regular rate and rhythm.  RESP: CTABL  ABD: soft nontender, nondistended, no rebound, no guarding, no rigidity  EXT: moves all extremities,  No clubbing, cyanosis or edema.   NEURO: Alert, oriented, grossly unremarkable, no focal deficits, cn ii-xii grossly intact  PSYCH: Cooperative, appropriate         Labs:                         14.5   8.51  )-----------( 210      ( 13 May 2022 06:15 )             42.5     Neutophil% 49.8, Lymphocyte% 37.5, Monocyte% 8.2, Bands% 0.2 05-13-22 @ 06:15    13 May 2022 06:15    136    |  101    |  14     ----------------------------<  112    4.6     |  23     |  0.9      Ca    10.0       13 May 2022 06:15  Phos  3.7       13 May 2022 06:15  Mg     1.8       13 May 2022 06:15    TPro  7.2    /  Alb  4.5    /  TBili  0.5    /  DBili  x      /  AST  17     /  ALT  13     /  AlkPhos  75     13 May 2022 06:15              Troponin <0.01, CKMB --, CK -- 05-11-22 @ 12:21                =============    CAPILLARY BLOOD GLUCOSE            LIVER FUNCTIONS - ( 13 May 2022 06:15 )  Alb: 4.5 g/dL / Pro: 7.2 g/dL / ALK PHOS: 75 U/L / ALT: 13 U/L / AST: 17 U/L / GGT: x               Radiology:   IMAGING:             Hospital Day:  9d    Subjective:    No acute events overnight.    Chart reviewed, patient seen and examined at bedside in AM. Patient appears comfortable while at rest in bed. No other complaints.     Denies headaches, changes in vision, chest pains, SOB, n/v/d, abd pain, constipation, changes in urination, pain on urination, weakness, fatigue, joint pain or muscle pain.       Past Medical Hx:   Lupus    AICD (automatic cardioverter/defibrillator) present    Throat cancer    RA (rheumatoid arthritis)    HTN (hypertension)    CHF (congestive heart failure)    COPD (chronic obstructive pulmonary disease)    DVT (deep venous thrombosis)    Pulmonary embolus    Pulmonary embolism    History of laryngeal cancer    GERD (gastroesophageal reflux disease)    Cervical cancer      Past Sx:  S/P appendectomy    S/P cholecystectomy    History of laryngeal cancer    AICD (automatic cardioverter/defibrillator) present    S/P hysterectomy    History of back surgery    H/O foot surgery    S/P eye surgery    Subclavian arterial stenosis      Allergies:  adhesives (Rash; Urticaria)  Arava (Anaphylaxis; Angioedema)  Avelox (Other)  Plaquenil (Other)  Vantin (Other (Mild))    Current Meds:   Standng Meds:  albuterol/ipratropium for Nebulization 3 milliLiter(s) Nebulizer every 6 hours  aspirin enteric coated 81 milliGRAM(s) Oral daily  budesonide 160 MICROgram(s)/formoterol 4.5 MICROgram(s) Inhaler 2 Puff(s) Inhalation two times a day  cyanocobalamin 1000 MICROGram(s) Oral daily  folic acid 1 milliGRAM(s) Oral daily  heparin   Injectable 05274 Unit(s) SubCutaneous every 12 hours  hydrALAZINE 50 milliGRAM(s) Oral two times a day  lidocaine 1%/epinephrine 1:100,000 Inj 20 milliLiter(s) Local Injection once  metoprolol succinate  milliGRAM(s) Oral daily  morphine ER Tablet 120 milliGRAM(s) Oral <User Schedule>    PRN Meds:  acetaminophen     Tablet .. 650 milliGRAM(s) Oral every 6 hours PRN Temp greater or equal to 38C (100.4F), Mild Pain (1 - 3)  aluminum hydroxide/magnesium hydroxide/simethicone Suspension 30 milliLiter(s) Oral every 4 hours PRN Dyspepsia  melatonin 3 milliGRAM(s) Oral at bedtime PRN Insomnia  ondansetron    Tablet 4 milliGRAM(s) Oral every 8 hours PRN Nausea and/or Vomiting  simethicone 80 milliGRAM(s) Chew two times a day PRN Indigestion    HOME MEDICATIONS:  albuterol 90 mcg/inh inhalation aerosol: 2 puff(s) inhaled every 6 hours, As Needed  ALPRAZolam 1 mg oral tablet: 2 tab(s) orally 3 times a day, As Needed  cyanocobalamin 1000 mcg/mL injectable solution: 1 milliliter(s) injectable once a week  folic acid 1 mg oral tablet: 1 tab(s) orally once a day  hydrALAZINE 50 mg oral tablet: 1 tab(s) orally 2 times a day  Metoprolol Succinate  mg oral tablet, extended release: 1 tab(s) orally once a day  MS Contin 60 mg oral tablet, extended release: 2 tab(s) orally 3 times a day (after meals), As Needed  Trelegy Ellipta 100 mcg-62.5 mcg-25 mcg/inh inhalation powder: 1 puff(s) inhaled once a day      Vital Signs:   T(F): 96.8 (05-14-22 @ 05:08), Max: 98.6 (05-13-22 @ 20:13)  HR: 75 (05-14-22 @ 05:08) (68 - 82)  BP: 136/63 (05-14-22 @ 05:08) (135/77 - 140/66)  RR: 18 (05-14-22 @ 05:08) (18 - 18)  SpO2: 96% (05-13-22 @ 20:13) (96% - 98%)        Physical Exam:   General: NAD, AAO3  HEENT:  EOMI, no LAD  CV: S1 S2  Resp: decreased breath sounds at bases  GI: NT/ND/S +BS  MS: Lt calf decreased swelling. No erythema; initial pustule/bullae resolved; post calf +echymoses, decreasing induration. RUE dark bullar around 10mm (s/p Bx and now C/d/i dsg) w/ a few smaller bullae. Also small dark bullae on Rt thigh  Neuro: nonfocal, +reflexes thruout      Labs:                         14.5   8.51  )-----------( 210      ( 13 May 2022 06:15 )             42.5     Neutophil% 49.8, Lymphocyte% 37.5, Monocyte% 8.2, Bands% 0.2 05-13-22 @ 06:15    13 May 2022 06:15    136    |  101    |  14     ----------------------------<  112    4.6     |  23     |  0.9      Ca    10.0       13 May 2022 06:15  Phos  3.7       13 May 2022 06:15  Mg     1.8       13 May 2022 06:15    TPro  7.2    /  Alb  4.5    /  TBili  0.5    /  DBili  x      /  AST  17     /  ALT  13     /  AlkPhos  75     13 May 2022 06:15              Troponin <0.01, CKMB --, CK -- 05-11-22 @ 12:21                =============    CAPILLARY BLOOD GLUCOSE            LIVER FUNCTIONS - ( 13 May 2022 06:15 )  Alb: 4.5 g/dL / Pro: 7.2 g/dL / ALK PHOS: 75 U/L / ALT: 13 U/L / AST: 17 U/L / GGT: x               Radiology:   IMAGING:             Hospital Day:  9d  Subjective:    Patient experienced significant bleeding from injection site s/p bolus heparin subQ last night. Patient administered injection in four locations in abdomen. bleeding controlled in AM.     Chart reviewed, patient seen and examined at bedside in AM. Patient appears comfortable while at rest in bed. No other complaints.     ROS negative.      Past Medical Hx:   Lupus    AICD (automatic cardioverter/defibrillator) present    Throat cancer    RA (rheumatoid arthritis)    HTN (hypertension)    CHF (congestive heart failure)    COPD (chronic obstructive pulmonary disease)    DVT (deep venous thrombosis)    Pulmonary embolus    Pulmonary embolism    History of laryngeal cancer    GERD (gastroesophageal reflux disease)    Cervical cancer      Past Sx:  S/P appendectomy    S/P cholecystectomy    History of laryngeal cancer    AICD (automatic cardioverter/defibrillator) present    S/P hysterectomy    History of back surgery    H/O foot surgery    S/P eye surgery    Subclavian arterial stenosis      Allergies:  adhesives (Rash; Urticaria)  Arava (Anaphylaxis; Angioedema)  Avelox (Other)  Plaquenil (Other)  Vantin (Other (Mild))    Current Meds:   Standng Meds:  albuterol/ipratropium for Nebulization 3 milliLiter(s) Nebulizer every 6 hours  aspirin enteric coated 81 milliGRAM(s) Oral daily  budesonide 160 MICROgram(s)/formoterol 4.5 MICROgram(s) Inhaler 2 Puff(s) Inhalation two times a day  cyanocobalamin 1000 MICROGram(s) Oral daily  folic acid 1 milliGRAM(s) Oral daily  heparin   Injectable 09981 Unit(s) SubCutaneous every 12 hours  hydrALAZINE 50 milliGRAM(s) Oral two times a day  lidocaine 1%/epinephrine 1:100,000 Inj 20 milliLiter(s) Local Injection once  metoprolol succinate  milliGRAM(s) Oral daily  morphine ER Tablet 120 milliGRAM(s) Oral <User Schedule>    PRN Meds:  acetaminophen     Tablet .. 650 milliGRAM(s) Oral every 6 hours PRN Temp greater or equal to 38C (100.4F), Mild Pain (1 - 3)  aluminum hydroxide/magnesium hydroxide/simethicone Suspension 30 milliLiter(s) Oral every 4 hours PRN Dyspepsia  melatonin 3 milliGRAM(s) Oral at bedtime PRN Insomnia  ondansetron    Tablet 4 milliGRAM(s) Oral every 8 hours PRN Nausea and/or Vomiting  simethicone 80 milliGRAM(s) Chew two times a day PRN Indigestion    HOME MEDICATIONS:  albuterol 90 mcg/inh inhalation aerosol: 2 puff(s) inhaled every 6 hours, As Needed  ALPRAZolam 1 mg oral tablet: 2 tab(s) orally 3 times a day, As Needed  cyanocobalamin 1000 mcg/mL injectable solution: 1 milliliter(s) injectable once a week  folic acid 1 mg oral tablet: 1 tab(s) orally once a day  hydrALAZINE 50 mg oral tablet: 1 tab(s) orally 2 times a day  Metoprolol Succinate  mg oral tablet, extended release: 1 tab(s) orally once a day  MS Contin 60 mg oral tablet, extended release: 2 tab(s) orally 3 times a day (after meals), As Needed  Trelegy Ellipta 100 mcg-62.5 mcg-25 mcg/inh inhalation powder: 1 puff(s) inhaled once a day      Vital Signs:   T(F): 96.8 (05-14-22 @ 05:08), Max: 98.6 (05-13-22 @ 20:13)  HR: 75 (05-14-22 @ 05:08) (68 - 82)  BP: 136/63 (05-14-22 @ 05:08) (135/77 - 140/66)  RR: 18 (05-14-22 @ 05:08) (18 - 18)  SpO2: 96% (05-13-22 @ 20:13) (96% - 98%)      Physical Exam:   General: NAD, AAO3  HEENT:  EOMI, no LAD  CV: S1 S2  Resp: decreased breath sounds at bases  GI: NT/ND/S +BS  MS: Lt calf decreased swelling. No erythema; initial pustule/bullae resolved; post calf +echymoses, decreasing induration. RUE dark bullar around 10mm (s/p Bx and now C/d/i dsg) w/ a few smaller bullae. Also small dark bullae on Rt thigh  Neuro: nonfocal, +reflexes thruout      Labs:                         14.5   8.51  )-----------( 210      ( 13 May 2022 06:15 )             42.5     Neutophil% 49.8, Lymphocyte% 37.5, Monocyte% 8.2, Bands% 0.2 05-13-22 @ 06:15    13 May 2022 06:15    136    |  101    |  14     ----------------------------<  112    4.6     |  23     |  0.9      Ca    10.0       13 May 2022 06:15  Phos  3.7       13 May 2022 06:15  Mg     1.8       13 May 2022 06:15    TPro  7.2    /  Alb  4.5    /  TBili  0.5    /  DBili  x      /  AST  17     /  ALT  13     /  AlkPhos  75     13 May 2022 06:15              Troponin <0.01, CKMB --, CK -- 05-11-22 @ 12:21                =============    CAPILLARY BLOOD GLUCOSE            LIVER FUNCTIONS - ( 13 May 2022 06:15 )  Alb: 4.5 g/dL / Pro: 7.2 g/dL / ALK PHOS: 75 U/L / ALT: 13 U/L / AST: 17 U/L / GGT: x               Radiology:   IMAGING:             Hospital Day:  9d  Subjective:    Patient experienced significant bleeding from injection site s/p bolus heparin subQ last night. Patient administered injection in four locations in abdomen. Bleeding from abdominal site controlled by time of evaluation in AM.     Chart reviewed, patient seen and examined at bedside in AM. Patient appears comfortable while at rest in bed. No other complaints.     ROS negative.    D/w heme-onc - will hold heparin subQ today due to concerns about significant bleeding       Past Medical Hx:   Lupus    AICD (automatic cardioverter/defibrillator) present    Throat cancer    RA (rheumatoid arthritis)    HTN (hypertension)    CHF (congestive heart failure)    COPD (chronic obstructive pulmonary disease)    DVT (deep venous thrombosis)    Pulmonary embolus    Pulmonary embolism    History of laryngeal cancer    GERD (gastroesophageal reflux disease)    Cervical cancer      Past Sx:  S/P appendectomy    S/P cholecystectomy    History of laryngeal cancer    AICD (automatic cardioverter/defibrillator) present    S/P hysterectomy    History of back surgery    H/O foot surgery    S/P eye surgery    Subclavian arterial stenosis      Allergies:  adhesives (Rash; Urticaria)  Arava (Anaphylaxis; Angioedema)  Avelox (Other)  Plaquenil (Other)  Vantin (Other (Mild))    Current Meds:   Standng Meds:  albuterol/ipratropium for Nebulization 3 milliLiter(s) Nebulizer every 6 hours  aspirin enteric coated 81 milliGRAM(s) Oral daily  budesonide 160 MICROgram(s)/formoterol 4.5 MICROgram(s) Inhaler 2 Puff(s) Inhalation two times a day  cyanocobalamin 1000 MICROGram(s) Oral daily  folic acid 1 milliGRAM(s) Oral daily  heparin   Injectable 70255 Unit(s) SubCutaneous every 12 hours  hydrALAZINE 50 milliGRAM(s) Oral two times a day  lidocaine 1%/epinephrine 1:100,000 Inj 20 milliLiter(s) Local Injection once  metoprolol succinate  milliGRAM(s) Oral daily  morphine ER Tablet 120 milliGRAM(s) Oral <User Schedule>    PRN Meds:  acetaminophen     Tablet .. 650 milliGRAM(s) Oral every 6 hours PRN Temp greater or equal to 38C (100.4F), Mild Pain (1 - 3)  aluminum hydroxide/magnesium hydroxide/simethicone Suspension 30 milliLiter(s) Oral every 4 hours PRN Dyspepsia  melatonin 3 milliGRAM(s) Oral at bedtime PRN Insomnia  ondansetron    Tablet 4 milliGRAM(s) Oral every 8 hours PRN Nausea and/or Vomiting  simethicone 80 milliGRAM(s) Chew two times a day PRN Indigestion    HOME MEDICATIONS:  albuterol 90 mcg/inh inhalation aerosol: 2 puff(s) inhaled every 6 hours, As Needed  ALPRAZolam 1 mg oral tablet: 2 tab(s) orally 3 times a day, As Needed  cyanocobalamin 1000 mcg/mL injectable solution: 1 milliliter(s) injectable once a week  folic acid 1 mg oral tablet: 1 tab(s) orally once a day  hydrALAZINE 50 mg oral tablet: 1 tab(s) orally 2 times a day  Metoprolol Succinate  mg oral tablet, extended release: 1 tab(s) orally once a day  MS Contin 60 mg oral tablet, extended release: 2 tab(s) orally 3 times a day (after meals), As Needed  Trelegy Ellipta 100 mcg-62.5 mcg-25 mcg/inh inhalation powder: 1 puff(s) inhaled once a day      Vital Signs:   T(F): 96.8 (05-14-22 @ 05:08), Max: 98.6 (05-13-22 @ 20:13)  HR: 75 (05-14-22 @ 05:08) (68 - 82)  BP: 136/63 (05-14-22 @ 05:08) (135/77 - 140/66)  RR: 18 (05-14-22 @ 05:08) (18 - 18)  SpO2: 96% (05-13-22 @ 20:13) (96% - 98%)      Physical Exam:   General: NAD, AAO3  HEENT:  EOMI, no LAD  CV: S1 S2  Resp: decreased breath sounds at bases  GI: NT/ND/S +BS  MS: Lt calf decreased swelling. No erythema; initial pustule/bullae resolved; post calf +echymoses, decreasing induration. RUE dark bullar around 10mm (s/p Bx and now C/d/i dsg) w/ a few smaller bullae. Also small dark bullae on Rt thigh  Neuro: nonfocal, +reflexes thruout      Labs:                         14.5   8.51  )-----------( 210      ( 13 May 2022 06:15 )             42.5     Neutophil% 49.8, Lymphocyte% 37.5, Monocyte% 8.2, Bands% 0.2 05-13-22 @ 06:15    13 May 2022 06:15    136    |  101    |  14     ----------------------------<  112    4.6     |  23     |  0.9      Ca    10.0       13 May 2022 06:15  Phos  3.7       13 May 2022 06:15  Mg     1.8       13 May 2022 06:15    TPro  7.2    /  Alb  4.5    /  TBili  0.5    /  DBili  x      /  AST  17     /  ALT  13     /  AlkPhos  75     13 May 2022 06:15              Troponin <0.01, CKMB --, CK -- 05-11-22 @ 12:21                =============    CAPILLARY BLOOD GLUCOSE            LIVER FUNCTIONS - ( 13 May 2022 06:15 )  Alb: 4.5 g/dL / Pro: 7.2 g/dL / ALK PHOS: 75 U/L / ALT: 13 U/L / AST: 17 U/L / GGT: x               Radiology:   IMAGING:             Hospital Day:  9d  Subjective:    Patient experienced significant bleeding from injection site s/p bolus heparin subQ last night. Patient administered injection in four locations in abdomen. Bleeding from abdominal site controlled by time of evaluation in AM.     Chart reviewed, patient seen and examined at bedside in AM. Patient appears comfortable while at rest in bed. No other complaints.     ROS negative.    - D/w heme-onc - will hold heparin subQ today due to concerns about significant bleeding   - 5/14 PM update: pt picked up heparin subQ [facility]; was told to  syringes from [facility] today    Past Medical Hx:   Lupus    AICD (automatic cardioverter/defibrillator) present    Throat cancer    RA (rheumatoid arthritis)    HTN (hypertension)    CHF (congestive heart failure)    COPD (chronic obstructive pulmonary disease)    DVT (deep venous thrombosis)    Pulmonary embolus    Pulmonary embolism    History of laryngeal cancer    GERD (gastroesophageal reflux disease)    Cervical cancer      Past Sx:  S/P appendectomy    S/P cholecystectomy    History of laryngeal cancer    AICD (automatic cardioverter/defibrillator) present    S/P hysterectomy    History of back surgery    H/O foot surgery    S/P eye surgery    Subclavian arterial stenosis      Allergies:  adhesives (Rash; Urticaria)  Arava (Anaphylaxis; Angioedema)  Avelox (Other)  Plaquenil (Other)  Vantin (Other (Mild))    Current Meds:   Standng Meds:  albuterol/ipratropium for Nebulization 3 milliLiter(s) Nebulizer every 6 hours  aspirin enteric coated 81 milliGRAM(s) Oral daily  budesonide 160 MICROgram(s)/formoterol 4.5 MICROgram(s) Inhaler 2 Puff(s) Inhalation two times a day  cyanocobalamin 1000 MICROGram(s) Oral daily  folic acid 1 milliGRAM(s) Oral daily  heparin   Injectable 20103 Unit(s) SubCutaneous every 12 hours  hydrALAZINE 50 milliGRAM(s) Oral two times a day  lidocaine 1%/epinephrine 1:100,000 Inj 20 milliLiter(s) Local Injection once  metoprolol succinate  milliGRAM(s) Oral daily  morphine ER Tablet 120 milliGRAM(s) Oral <User Schedule>    PRN Meds:  acetaminophen     Tablet .. 650 milliGRAM(s) Oral every 6 hours PRN Temp greater or equal to 38C (100.4F), Mild Pain (1 - 3)  aluminum hydroxide/magnesium hydroxide/simethicone Suspension 30 milliLiter(s) Oral every 4 hours PRN Dyspepsia  melatonin 3 milliGRAM(s) Oral at bedtime PRN Insomnia  ondansetron    Tablet 4 milliGRAM(s) Oral every 8 hours PRN Nausea and/or Vomiting  simethicone 80 milliGRAM(s) Chew two times a day PRN Indigestion    HOME MEDICATIONS:  albuterol 90 mcg/inh inhalation aerosol: 2 puff(s) inhaled every 6 hours, As Needed  ALPRAZolam 1 mg oral tablet: 2 tab(s) orally 3 times a day, As Needed  cyanocobalamin 1000 mcg/mL injectable solution: 1 milliliter(s) injectable once a week  folic acid 1 mg oral tablet: 1 tab(s) orally once a day  hydrALAZINE 50 mg oral tablet: 1 tab(s) orally 2 times a day  Metoprolol Succinate  mg oral tablet, extended release: 1 tab(s) orally once a day  MS Contin 60 mg oral tablet, extended release: 2 tab(s) orally 3 times a day (after meals), As Needed  Trelegy Ellipta 100 mcg-62.5 mcg-25 mcg/inh inhalation powder: 1 puff(s) inhaled once a day      Vital Signs:   T(F): 96.8 (05-14-22 @ 05:08), Max: 98.6 (05-13-22 @ 20:13)  HR: 75 (05-14-22 @ 05:08) (68 - 82)  BP: 136/63 (05-14-22 @ 05:08) (135/77 - 140/66)  RR: 18 (05-14-22 @ 05:08) (18 - 18)  SpO2: 96% (05-13-22 @ 20:13) (96% - 98%)      Physical Exam:   General: NAD, AAO3  HEENT:  EOMI, no LAD  CV: S1 S2  Resp: decreased breath sounds at bases  GI: NT/ND/S +BS  MS: Lt calf decreased swelling. No erythema; initial pustule/bullae resolved; post calf +echymoses, decreasing induration. RUE dark bullar around 10mm (s/p Bx and now C/d/i dsg) w/ a few smaller bullae. Also small dark bullae on Rt thigh  Neuro: nonfocal, +reflexes thruout      Labs:                         14.5   8.51  )-----------( 210      ( 13 May 2022 06:15 )             42.5     Neutophil% 49.8, Lymphocyte% 37.5, Monocyte% 8.2, Bands% 0.2 05-13-22 @ 06:15    13 May 2022 06:15    136    |  101    |  14     ----------------------------<  112    4.6     |  23     |  0.9      Ca    10.0       13 May 2022 06:15  Phos  3.7       13 May 2022 06:15  Mg     1.8       13 May 2022 06:15    TPro  7.2    /  Alb  4.5    /  TBili  0.5    /  DBili  x      /  AST  17     /  ALT  13     /  AlkPhos  75     13 May 2022 06:15              Troponin <0.01, CKMB --, CK -- 05-11-22 @ 12:21                =============    CAPILLARY BLOOD GLUCOSE            LIVER FUNCTIONS - ( 13 May 2022 06:15 )  Alb: 4.5 g/dL / Pro: 7.2 g/dL / ALK PHOS: 75 U/L / ALT: 13 U/L / AST: 17 U/L / GGT: x               Radiology:   IMAGING:             Hospital Day:  9d  Subjective:    Patient experienced significant bleeding from injection site s/p bolus heparin subQ last night. Patient administered injection in four locations in abdomen. Bleeding from abdominal site controlled by time of evaluation in AM.     Chart reviewed, patient seen and examined at bedside in AM. Patient appears comfortable while at rest in bed. No other complaints.     ROS negative.    - D/w heme-onc - will hold heparin subQ today due to concerns about significant bleeding   - 5/14 PM update: pt picked up heparin subQ [facility]; was told to  syringes from [facility] today    Past Medical Hx:   Lupus    AICD (automatic cardioverter/defibrillator) present    Throat cancer    RA (rheumatoid arthritis)    HTN (hypertension)    CHF (congestive heart failure)    COPD (chronic obstructive pulmonary disease)    DVT (deep venous thrombosis)    Pulmonary embolus    Pulmonary embolism    History of laryngeal cancer    GERD (gastroesophageal reflux disease)    Cervical cancer      Past Sx:  S/P appendectomy    S/P cholecystectomy    History of laryngeal cancer    AICD (automatic cardioverter/defibrillator) present    S/P hysterectomy    History of back surgery    H/O foot surgery    S/P eye surgery    Subclavian arterial stenosis      Allergies:  adhesives (Rash; Urticaria)  Arava (Anaphylaxis; Angioedema)  Avelox (Other)  Plaquenil (Other)  Vantin (Other (Mild))    Current Meds:   Standng Meds:  albuterol/ipratropium for Nebulization 3 milliLiter(s) Nebulizer every 6 hours  aspirin enteric coated 81 milliGRAM(s) Oral daily  budesonide 160 MICROgram(s)/formoterol 4.5 MICROgram(s) Inhaler 2 Puff(s) Inhalation two times a day  cyanocobalamin 1000 MICROGram(s) Oral daily  folic acid 1 milliGRAM(s) Oral daily  heparin   Injectable 55170 Unit(s) SubCutaneous every 12 hours  hydrALAZINE 50 milliGRAM(s) Oral two times a day  lidocaine 1%/epinephrine 1:100,000 Inj 20 milliLiter(s) Local Injection once  metoprolol succinate  milliGRAM(s) Oral daily  morphine ER Tablet 120 milliGRAM(s) Oral <User Schedule>    PRN Meds:  acetaminophen     Tablet .. 650 milliGRAM(s) Oral every 6 hours PRN Temp greater or equal to 38C (100.4F), Mild Pain (1 - 3)  aluminum hydroxide/magnesium hydroxide/simethicone Suspension 30 milliLiter(s) Oral every 4 hours PRN Dyspepsia  melatonin 3 milliGRAM(s) Oral at bedtime PRN Insomnia  ondansetron    Tablet 4 milliGRAM(s) Oral every 8 hours PRN Nausea and/or Vomiting  simethicone 80 milliGRAM(s) Chew two times a day PRN Indigestion    HOME MEDICATIONS:  albuterol 90 mcg/inh inhalation aerosol: 2 puff(s) inhaled every 6 hours, As Needed  ALPRAZolam 1 mg oral tablet: 2 tab(s) orally 3 times a day, As Needed  cyanocobalamin 1000 mcg/mL injectable solution: 1 milliliter(s) injectable once a week  folic acid 1 mg oral tablet: 1 tab(s) orally once a day  hydrALAZINE 50 mg oral tablet: 1 tab(s) orally 2 times a day  Metoprolol Succinate  mg oral tablet, extended release: 1 tab(s) orally once a day  MS Contin 60 mg oral tablet, extended release: 2 tab(s) orally 3 times a day (after meals), As Needed  Trelegy Ellipta 100 mcg-62.5 mcg-25 mcg/inh inhalation powder: 1 puff(s) inhaled once a day      Vital Signs:   T(F): 96.8 (05-14-22 @ 05:08), Max: 98.6 (05-13-22 @ 20:13)  HR: 75 (05-14-22 @ 05:08) (68 - 82)  BP: 136/63 (05-14-22 @ 05:08) (135/77 - 140/66)  RR: 18 (05-14-22 @ 05:08) (18 - 18)  SpO2: 96% (05-13-22 @ 20:13) (96% - 98%)      Physical Exam:   General: NAD, AAO3  HEENT:  EOMI, no LAD  CV: S1 S2  Resp: decreased breath sounds at bases  GI: NT/ND/S +BS  MS: Lt calf decreased swelling. No erythema; initial pustule/bullae resolved; post calf +echymoses, decreasing induration. RUE dark bullar around 10mm (s/p Bx and now C/d/i dsg) w/ a few smaller bullae. Also small dark bullae on Rt thigh  Neuro: nonfocal, +reflexes thruout      Labs:                         14.5   8.51  )-----------( 210      ( 13 May 2022 06:15 )             42.5     Neutophil% 49.8, Lymphocyte% 37.5, Monocyte% 8.2, Bands% 0.2 05-13-22 @ 06:15    13 May 2022 06:15    136    |  101    |  14     ----------------------------<  112    4.6     |  23     |  0.9      Ca    10.0       13 May 2022 06:15  Phos  3.7       13 May 2022 06:15  Mg     1.8       13 May 2022 06:15    TPro  7.2    /  Alb  4.5    /  TBili  0.5    /  DBili  x      /  AST  17     /  ALT  13     /  AlkPhos  75     13 May 2022 06:15              Troponin <0.01, CKMB --, CK -- 05-11-22 @ 12:21      CAPILLARY BLOOD GLUCOSE      LIVER FUNCTIONS - ( 13 May 2022 06:15 )  Alb: 4.5 g/dL / Pro: 7.2 g/dL / ALK PHOS: 75 U/L / ALT: 13 U/L / AST: 17 U/L / GGT: x             Radiology:   IMAGING: Reviewed

## 2022-05-14 NOTE — PROGRESS NOTE ADULT - TIME BILLING
I have personally seen and examined this patient.  I have reviewed all pertinent clinical information and reviewed all relevant imaging and diagnostic studies personally.   I counseled the patient about diagnostic testing and treatment plan. All questions were answered.  I discussed recommendations with the primary team.
direct pt care and interdisciplinary rounds

## 2022-05-14 NOTE — PROGRESS NOTE ADULT - ASSESSMENT
51 year old woman with PMH of SLE  lupus with lupus anticoagulant, antiphospholipid syndrome,  rheumatoid arthritis, cervical cancer sp hysterectomy,  laryngeal cancer sp CT, RT, CHF s/p AICD+PPM, DVT/PE in 2015 on Fondaparinux, NSTEMI in 2015, subclavian stenosis s/p stent placement, HTN, HLD, PPM/AICD presented to the hospital for a wound on the calf of the left lower extremity. pt has had this wound since last week with swelling of the calf, erythema, fever chills. She's been taking Augmentin for the last 6 days but has been worsening with black eschar. pt went to Dr Guan who performed a duplex, was negative    A/P:   #Left lower extremity Hemorrhagic bullae w/ superimposed cellulitis   #RUE hemorrhagic dermatitis due to ?Arixtra  -Blood cx no growth  -Arterial Duplex was normal.   -No fever  -Burn evaluated   - rheum c/s appreciated  - f/u rheum labs (e.g. C3, C4, etc.)   - the erythema resolved  - ID recommended to d/c ABx  < from: CT Angio Lower Extremity w/ IV Cont, Left (05.09.22 @ 22:59) >  Subcutaneous fat stranding and skin thickening in posteromedial calf area, which may reflect developing cellulitis.  No encapsulated or organized fluid collection.  < end of copied text >  -suspect the fat stranding due to possible engorged lymphatics   -d/w vasc: no further inpt workup  -advised pt to elevate LLE and use warm compresses  -5/12: new RUE ?hemorrhagic dermatitis due to ?Arixtra. D/w ID and heme. Pt wants to be switched to Heparin SQ injections. Derm and burn c/s for Bx.  -5/13: s/p skin Bx. Pt declines Arixtra and Lovenox and wants to go home on Heparin SQ therapeutic. Had 3 separate conversation w/ pt thruout the day about the off label use of Heparin SQ and risks involved. Pt insists that she did it before when she was pregnant. Pt aware of risks and the need to draw and measure/calculate the amount to be injected in ml(s). Pt verbalizes the risks and wants to proceed w/ Heparin SQ. D/w hematology the dosing and ordered inpt and outpt ([facility]). Asked nursing to start teaching pt how to measure/calculate the dose based on concentration. As per [facility], will be delivered by tomorrow as a special order.  - 5/13 PM;: sent script for [facility]q q12h to [facility] (#[id number]); discussed with pharmacist   --- per pharmacist, needs to special order heparin because no heparin is available in pharmacy - should arrive by 5/14;   --- per pharmacist, associated syringe is BD syringe 27 gauge 1 mL, which comes with attached needle, so no need to order needle separately.   - pt given heparin subQ bolus and provided instructions on how to administer;      #History of VTE/ PE  - A/c as above     #History of long-QT syndrome and VT sp AICD  #HFpEF  - EF 63% 2019  - s/p AICD placement (s/p laser lead extraction of ICD leads and device and reimplanted with new RA/RV lead and new ICD device (Medtronic) in 2021)    #SLE/RA  #APLAS  - not in flare  - A/c as above  -rheum f/u outpt    #COPD  - inhalers and nebs for COPD   -counselled pt to avoid smoking and secondary exposure  - CXR w/out infiltrates (my read)    #Chronic pain syndrome  -I-Stop checked and meds ordered    #anxiety  -Xanax PRN    Dispo: once pt/ show comfort measuring/injecting Heparin and medication delivered to the [facility] 51 year old woman with PMH of SLE  lupus with lupus anticoagulant, antiphospholipid syndrome,  rheumatoid arthritis, cervical cancer sp hysterectomy,  laryngeal cancer sp CT, RT, CHF s/p AICD+PPM, DVT/PE in 2015 on Fondaparinux, NSTEMI in 2015, subclavian stenosis s/p stent placement, HTN, HLD, PPM/AICD presented to the hospital for a wound on the calf of the left lower extremity. pt has had this wound since last week with swelling of the calf, erythema, fever chills. She's been taking Augmentin for the last 6 days but has been worsening with black eschar. pt went to Dr Guan who performed a duplex, was negative    A/P:   #Fondaparinux-associated bullous hemorrhagic dermatosis   #Left lower extremity Hemorrhagic bullae w/ ?superimposed cellulitis   #RUE hemorrhagic dermatitis due to ?Arixtra   -Blood cx no growth  -Arterial Duplex was normal.   -No fever  -Burn evaluated   - rheum c/s appreciated  - f/u rheum labs (e.g. C3, C4, etc.)   - the erythema resolved  - ID recommended to d/c ABx  < from: CT Angio Lower Extremity w/ IV Cont, Left (05.09.22 @ 22:59) >  Subcutaneous fat stranding and skin thickening in posteromedial calf area, which may reflect developing cellulitis.  No encapsulated or organized fluid collection.  < end of copied text >  -suspect the fat stranding due to possible engorged lymphatics   -d/w vasc: no further inpt workup  -advised pt to elevate LLE and use warm compresses  -5/12: new RUE ?hemorrhagic dermatitis due to ?Arixtra. D/w ID and heme. Pt wants to be switched to Heparin SQ injections. Derm and burn c/s for Bx.  -5/13: s/p skin Bx. Pt declines Arixtra and Lovenox and wants to go home on Heparin SQ therapeutic. Had 3 separate conversation w/ pt thruout the day about the off label use of Heparin SQ and risks involved. Pt insists that she did it before when she was pregnant. Pt aware of risks and the need to draw and measure/calculate the amount to be injected in ml(s). Pt verbalizes the risks and wants to proceed w/ Heparin SQ. D/w hematology the dosing and ordered inpt and outpt ([facility]). Asked nursing to start teaching pt how to measure/calculate the dose based on concentration. As per [facility], will be delivered by tomorrow as a special order.  - 5/13 PM;: sent script for heparin subq q12h to [facility] (#[id number]); discussed with pharmacist   --- per pharmacist, needs to special order heparin because no heparin is available in pharmacy - should arrive by 5/14;   --- per pharmacist, associated syringe is BD syringe 27 gauge 1 mL, which comes with attached needle, so no need to order needle separately.   - pt given heparin subQ bolus and provided instructions on how to administer;      #History of VTE/ PE  - A/c as above     #History of long-QT syndrome and VT sp AICD  #HFpEF  - EF 63% 2019  - s/p AICD placement (s/p laser lead extraction of ICD leads and device and reimplanted with new RA/RV lead and new ICD device (Medtronic) in 2021)    #SLE/RA  #APLAS  - not in flare  - A/c as above  -rheum f/u outpt    #COPD  - inhalers and nebs for COPD   -counselled pt to avoid smoking and secondary exposure  - CXR w/out infiltrates (my read)    #Chronic pain syndrome  -I-Stop checked and meds ordered    #anxiety  -Xanax PRN    Dispo: once pt/ show comfort measuring/injecting Heparin and medication delivered to the [facility] 51 year old woman with PMH of SLE  lupus with lupus anticoagulant, antiphospholipid syndrome,  rheumatoid arthritis, cervical cancer sp hysterectomy,  laryngeal cancer sp CT, RT, CHF s/p AICD+PPM, DVT/PE in 2015 on Fondaparinux, NSTEMI in 2015, subclavian stenosis s/p stent placement, HTN, HLD, PPM/AICD presented to the hospital for a wound on the calf of the left lower extremity. pt has had this wound since last week with swelling of the calf, erythema, fever chills. She's been taking Augmentin for the last 6 days but has been worsening with black eschar. pt went to Dr Guan who performed a duplex, was negative    A/P:   #Fondaparinux-associated bullous hemorrhagic dermatosis   #Left lower extremity Hemorrhagic bullae w/ ?superimposed cellulitis   #RUE hemorrhagic dermatitis due to ?Arixtra   -Blood cx no growth  -Arterial Duplex was normal.   -No fever  -Burn evaluated   - rheum c/s appreciated  - f/u rheum labs (e.g. C3, C4, etc.)   - the erythema resolved  - ID recommended to d/c ABx  < from: CT Angio Lower Extremity w/ IV Cont, Left (05.09.22 @ 22:59) >  Subcutaneous fat stranding and skin thickening in posteromedial calf area, which may reflect developing cellulitis.  No encapsulated or organized fluid collection.  < end of copied text >  -suspect the fat stranding due to possible engorged lymphatics   -d/w vasc: no further inpt workup  -advised pt to elevate LLE and use warm compresses  -5/12: new RUE ?hemorrhagic dermatitis due to ?Arixtra. D/w ID and heme. Pt wants to be switched to Heparin SQ injections. Derm and burn c/s for Bx.  -5/13: s/p skin Bx. Pt declines Arixtra and Lovenox and wants to go home on Heparin SQ therapeutic. Had 3 separate conversation w/ pt thruout the day about the off label use of Heparin SQ and risks involved. Pt insists that she did it before when she was pregnant. Pt aware of risks and the need to draw and measure/calculate the amount to be injected in ml(s). Pt verbalizes the risks and wants to proceed w/ Heparin SQ. D/w hematology the dosing and ordered inpt and outpt ([facility]). Asked nursing to start teaching pt how to measure/calculate the dose based on concentration. As per [facility], will be delivered by tomorrow as a special order.  - 5/13 PM;: sent script for heparin subq q12h to [facility] (#[id number]); discussed with pharmacist   -- per pharmacist, needs to special order heparin because no heparin is available in pharmacy - should arrive by 5/14;   -- per pharmacist, associated syringe is BD syringe 27 gauge 1 mL, which comes with attached needle, so no need to order needle separately.   - pt given heparin subQ bolus and provided instructions on how to administer;   - 5/14 d/w heme-onc: plan to hold both doses of subQ heparin today given significant bleeding overnight; will f/u CBC to monitor H/H; ordered T+S  - 5/15 - plan to resume heparin subQ if h+h stable       #History of VTE/ PE  - A/c as above     #History of long-QT syndrome and VT sp AICD  #HFpEF  - EF 63% 2019  - s/p AICD placement (s/p laser lead extraction of ICD leads and device and reimplanted with new RA/RV lead and new ICD device (Medtronic) in 2021)    #SLE/RA  #APLAS  - not in flare  - A/c as above  -rheum f/u outpt    #COPD  - inhalers and nebs for COPD   -counselled pt to avoid smoking and secondary exposure  - CXR w/out infiltrates (my read)    #Chronic pain syndrome  -I-Stop checked and meds ordered    #anxiety  -Xanax PRN    Dispo: once pt/ show comfort measuring/injecting Heparin and medication delivered to the [facility] 51 year old woman with PMH of SLE  lupus with lupus anticoagulant, antiphospholipid syndrome,  rheumatoid arthritis, cervical cancer sp hysterectomy,  laryngeal cancer sp CT, RT, CHF s/p AICD+PPM, DVT/PE in 2015 on Fondaparinux, NSTEMI in 2015, subclavian stenosis s/p stent placement, HTN, HLD, PPM/AICD presented to the hospital for a wound on the calf of the left lower extremity. pt has had this wound since last week with swelling of the calf, erythema, fever chills. She's been taking Augmentin for the last 6 days but has been worsening with black eschar. pt went to Dr Guan who performed a duplex, was negative    A/P:   #Fondaparinux-associated bullous hemorrhagic dermatosis   #Left lower extremity Hemorrhagic bullae w/ ?superimposed cellulitis   #RUE hemorrhagic dermatitis due to ?Arixtra   -Blood cx no growth  -Arterial Duplex was normal.   -No fever  -Burn evaluated   - rheum c/s appreciated  - f/u rheum labs (e.g. C3, C4, etc.)   - the erythema resolved  - ID recommended to d/c ABx  < from: CT Angio Lower Extremity w/ IV Cont, Left (05.09.22 @ 22:59) >  Subcutaneous fat stranding and skin thickening in posteromedial calf area, which may reflect developing cellulitis.  No encapsulated or organized fluid collection.  < end of copied text >  -suspect the fat stranding due to possible engorged lymphatics   -d/w vasc: no further inpt workup  -advised pt to elevate LLE and use warm compresses  -5/12: new RUE ?hemorrhagic dermatitis due to ?Arixtra. D/w ID and heme. Pt wants to be switched to Heparin SQ injections. Derm and burn c/s for Bx.  -5/13: s/p skin Bx. Pt declines Arixtra and Lovenox and wants to go home on Heparin SQ therapeutic. Had 3 separate conversation w/ pt thruout the day about the off label use of Heparin SQ and risks involved. Pt insists that she did it before when she was pregnant. Pt aware of risks and the need to draw and measure/calculate the amount to be injected in ml(s). Pt verbalizes the risks and wants to proceed w/ Heparin SQ. D/w hematology the dosing and ordered inpt and outpt ([facility]). Asked nursing to start teaching pt how to measure/calculate the dose based on concentration. As per [facility], will be delivered by tomorrow as a special order.  - 5/13 PM;: sent script for heparin subq q12h to [facility] (#[id number]); discussed with pharmacist   -- per pharmacist, needs to special order heparin because no heparin is available in pharmacy - should arrive by 5/14;   -- per pharmacist, associated syringe is BD syringe 27 gauge 1 mL, which comes with attached needle, so no need to order needle separately.   - pt given heparin subQ bolus and provided instructions on how to administer;   - 5/14 d/w heme-onc: plan to hold both doses of subQ heparin today given significant bleeding overnight; will f/u CBC to monitor H/H; ordered T+S  - 5/15 - plan to resume heparin subQ if h+h stable       #History of VTE/ PE  - A/c as above     #History of long-QT syndrome and VT sp AICD  #HFpEF  - EF 63% 2019  - s/p AICD placement (s/p laser lead extraction of ICD leads and device and reimplanted with new RA/RV lead and new ICD device (Medtronic) in 2021)    #SLE/RA  #APLAS  - not in flare  - A/c as above  -rheum f/u outpt    #COPD  - inhalers and nebs for COPD   -counselled pt to avoid smoking and secondary exposure  - CXR w/out infiltrates (my read)    #Chronic pain syndrome  -I-Stop checked and meds ordered    #anxiety  -Xanax PRN    Handoff:   - [facility] (5/14)   - syringes not yet sent  (5/14)   - plan to restart subQ heparin q12h on 5/15  - pending - pt &  show comfort measuring/injecting Heparin    Dispo  - patient NOT stable for discharge today;   - heparin subQ held 5/14 due to bleeding concerns and as per heme-onc recs   51 year old woman with PMH of SLE  lupus with lupus anticoagulant, antiphospholipid syndrome,  rheumatoid arthritis, cervical cancer sp hysterectomy,  laryngeal cancer sp CT, RT, CHF s/p AICD+PPM, DVT/PE in 2015 on Fondaparinux, NSTEMI in 2015, subclavian stenosis s/p stent placement, HTN, HLD, PPM/AICD presented to the hospital for a wound on the calf of the left lower extremity. pt has had this wound since last week with swelling of the calf, erythema, fever chills. She's been taking Augmentin for the last 6 days but has been worsening with black eschar. pt went to Dr Guan who performed a duplex, was negative    A/P:   #Fondaparinux-associated bullous hemorrhagic dermatosis   #Left lower extremity Hemorrhagic bullae w/ ?superimposed cellulitis   #RUE hemorrhagic dermatitis due to ?Arixtra   -Blood cx no growth  -Arterial Duplex was normal.   -No fever  -Burn evaluated   - rheum c/s appreciated  - f/u rheum labs (e.g. C3, C4, etc.)   - the erythema resolved  - ID recommended to d/c ABx  < from: CT Angio Lower Extremity w/ IV Cont, Left (05.09.22 @ 22:59) >  Subcutaneous fat stranding and skin thickening in posteromedial calf area, which may reflect developing cellulitis.  No encapsulated or organized fluid collection.  < end of copied text >  -suspect the fat stranding due to possible engorged lymphatics   -d/w vasc: no further inpt workup  -advised pt to elevate LLE and use warm compresses  -5/12: new RUE ?hemorrhagic dermatitis due to ?Arixtra. D/w ID and heme. Pt wants to be switched to Heparin SQ injections. Derm and burn c/s for Bx.  -5/13: s/p skin Bx. Pt declines Arixtra and Lovenox and wants to go home on Heparin SQ therapeutic. Had 3 separate conversation w/ pt thruout the day about the off label use of Heparin SQ and risks involved. Pt insists that she did it before when she was pregnant. Pt aware of risks and the need to draw and measure/calculate the amount to be injected in ml(s). Pt verbalizes the risks and wants to proceed w/ Heparin SQ. D/w hematology the dosing and ordered inpt and outpt ([facility]). Asked nursing to start teaching pt how to measure/calculate the dose based on concentration. As per [facility], will be delivered by tomorrow as a special order.  - 5/13 PM;: sent script for heparin subq q12h to [facility] (#[id number]); discussed with pharmacist   -- per pharmacist, needs to special order heparin because no heparin is available in pharmacy - should arrive by 5/14;   -- per pharmacist, associated syringe is BD syringe 27 gauge 1 mL, which comes with attached needle, so no need to order needle separately.   - pt given heparin subQ bolus and provided instructions on how to administer;   - paper script for syringes brought down to [facility] 5/14 - in stock  - 5/14 d/w heme-onc: plan to hold both doses of subQ heparin today given significant bleeding overnight; will f/u CBC to monitor H/H; ordered T+S  - 5/15 - plan to resume heparin subQ if h+h stable     #History of VTE/ PE  - A/c as above     #History of long-QT syndrome and VT sp AICD  #HFpEF  - EF 63% 2019  - s/p AICD placement (s/p laser lead extraction of ICD leads and device and reimplanted with new RA/RV lead and new ICD device ([employer]tr[employer]) in 2021)    #SLE/RA  #APLAS  - not in flare  - A/c as above  -rheum f/u outpt    #COPD  - inhalers and nebs for COPD   -counselled pt to avoid smoking and secondary exposure  - CXR w/out infiltrates (my read)    #Chronic pain syndrome  -I-Stop checked and meds ordered    #anxiety  -Xanax PRN    Handoff:   - subQ heparin arrived at [facility] (5/14)   - syringes not yet sent  (5/14)   - plan to restart subQ heparin q12h on 5/15  - pending - pt &  show comfort measuring/injecting Heparin    Dispo  - patient NOT stable for discharge today;   - heparin subQ held 5/14 due to bleeding concerns and as per heme-onc recs   51 year old woman with PMH of SLE  lupus with lupus anticoagulant, antiphospholipid syndrome,  rheumatoid arthritis, cervical cancer sp hysterectomy,  laryngeal cancer sp CT, RT, CHF s/p AICD+PPM, DVT/PE in 2015 on Fondaparinux, NSTEMI in 2015, subclavian stenosis s/p stent placement, HTN, HLD, PPM/AICD presented to the hospital for a wound on the calf of the left lower extremity. pt has had this wound since last week with swelling of the calf, erythema, fever chills. She's been taking Augmentin for the last 6 days but has been worsening with black eschar. pt went to Dr Guan who performed a duplex, was negative    A/P:   #Fondaparinux-associated bullous hemorrhagic dermatosis   #Left lower extremity Hemorrhagic bullae w/ ?superimposed cellulitis   #RUE hemorrhagic dermatitis due to ?Arixtra   -Blood cx no growth  -Arterial Duplex was normal.   -No fever  -Burn evaluated   - rheum c/s appreciated  - f/u rheum labs (e.g. C3, C4, etc.)   - the erythema resolved  - ID recommended to d/c ABx  < from: CT Angio Lower Extremity w/ IV Cont, Left (05.09.22 @ 22:59) >  Subcutaneous fat stranding and skin thickening in posteromedial calf area, which may reflect developing cellulitis.  No encapsulated or organized fluid collection.  < end of copied text >  -suspect the fat stranding due to possible engorged lymphatics   -d/w vasc: no further inpt workup  -advised pt to elevate LLE and use warm compresses  -5/12: new RUE ?hemorrhagic dermatitis due to ?Arixtra. D/w ID and heme. Pt wants to be switched to Heparin SQ injections. Derm and burn c/s for Bx.  -5/13: s/p skin Bx. Pt declines Arixtra and Lovenox and wants to go home on Heparin SQ therapeutic. Had 3 separate conversation w/ pt thruout the day about the off label use of Heparin SQ and risks involved. Pt insists that she did it before when she was pregnant. Pt aware of risks and the need to draw and measure/calculate the amount to be injected in ml(s). Pt verbalizes the risks and wants to proceed w/ Heparin SQ. D/w hematology the dosing and ordered inpt and outpt ([facility]). Asked nursing to start teaching pt how to measure/calculate the dose based on concentration. As per [facility], will be delivered by tomorrow as a special order.  - 5/13 PM;: sent script for heparin subq q12h to [facility] (#[id number]); discussed with pharmacist   -- per pharmacist, needs to special order heparin because no heparin is available in pharmacy - should arrive by 5/14;   -- per pharmacist, associated syringe is BD syringe 27 gauge 1 mL, which comes with attached needle, so no need to order needle separately.   - pt given heparin subQ bolus and provided instructions on how to administer;   - paper script for syringes brought down to [facility] 5/14 - in stock; pt needs to  before 4pm today  - 5/14 d/w heme-onc: plan to hold both doses of subQ heparin today given significant bleeding overnight; will f/u CBC to monitor H/H; ordered T+S  - 5/15 - plan to resume heparin subQ if h+h stable     #History of VTE/ PE  - A/c as above     #History of long-QT syndrome and VT sp AICD  #HFpEF  - EF 63% 2019  - s/p AICD placement (s/p laser lead extraction of ICD leads and device and reimplanted with new RA/RV lead and new ICD device ([employer]) in 2021)    #SLE/RA  #APLAS  - not in flare  - A/c as above  -rheum f/u outpt    #COPD  - inhalers and nebs for COPD   -counselled pt to avoid smoking and secondary exposure  - CXR w/out infiltrates (my read)    #Chronic pain syndrome  -I-Stop checked and meds ordered    #anxiety  -Xanax PRN    Handoff:   - subQ heparin arrived at [facility] (5/14)   - syringes not yet sent  (5/14)   - plan to restart subQ heparin q12h on 5/15  - pending - pt &  show comfort measuring/injecting Heparin    Dispo  - patient NOT stable for discharge today;   - heparin subQ held 5/14 due to bleeding concerns and as per heme-onc recs

## 2022-05-15 VITALS
DIASTOLIC BLOOD PRESSURE: 72 MMHG | TEMPERATURE: 98 F | HEART RATE: 98 BPM | RESPIRATION RATE: 18 BRPM | SYSTOLIC BLOOD PRESSURE: 122 MMHG

## 2022-05-15 LAB
ALBUMIN SERPL ELPH-MCNC: 4.5 G/DL — SIGNIFICANT CHANGE UP (ref 3.5–5.2)
ALP SERPL-CCNC: 73 U/L — SIGNIFICANT CHANGE UP (ref 30–115)
ALT FLD-CCNC: 11 U/L — SIGNIFICANT CHANGE UP (ref 0–41)
ANION GAP SERPL CALC-SCNC: 13 MMOL/L — SIGNIFICANT CHANGE UP (ref 7–14)
AST SERPL-CCNC: 15 U/L — SIGNIFICANT CHANGE UP (ref 0–41)
AUTO DIFF PNL BLD: ABNORMAL
BASOPHILS # BLD AUTO: 0.07 K/UL — SIGNIFICANT CHANGE UP (ref 0–0.2)
BASOPHILS NFR BLD AUTO: 0.9 % — SIGNIFICANT CHANGE UP (ref 0–1)
BILIRUB SERPL-MCNC: 0.5 MG/DL — SIGNIFICANT CHANGE UP (ref 0.2–1.2)
BUN SERPL-MCNC: 15 MG/DL — SIGNIFICANT CHANGE UP (ref 10–20)
C-ANCA SER-ACNC: NEGATIVE — SIGNIFICANT CHANGE UP
CALCIUM SERPL-MCNC: 10.2 MG/DL — HIGH (ref 8.5–10.1)
CHLORIDE SERPL-SCNC: 99 MMOL/L — SIGNIFICANT CHANGE UP (ref 98–110)
CO2 SERPL-SCNC: 25 MMOL/L — SIGNIFICANT CHANGE UP (ref 17–32)
CREAT SERPL-MCNC: 0.9 MG/DL — SIGNIFICANT CHANGE UP (ref 0.7–1.5)
EGFR: 77 ML/MIN/1.73M2 — SIGNIFICANT CHANGE UP
EOSINOPHIL # BLD AUTO: 0.23 K/UL — SIGNIFICANT CHANGE UP (ref 0–0.7)
EOSINOPHIL NFR BLD AUTO: 2.8 % — SIGNIFICANT CHANGE UP (ref 0–8)
GLUCOSE SERPL-MCNC: 100 MG/DL — HIGH (ref 70–99)
HCT VFR BLD CALC: 40.5 % — SIGNIFICANT CHANGE UP (ref 37–47)
HGB BLD-MCNC: 13.8 G/DL — SIGNIFICANT CHANGE UP (ref 12–16)
IMM GRANULOCYTES NFR BLD AUTO: 0.2 % — SIGNIFICANT CHANGE UP (ref 0.1–0.3)
LYMPHOCYTES # BLD AUTO: 2.72 K/UL — SIGNIFICANT CHANGE UP (ref 1.2–3.4)
LYMPHOCYTES # BLD AUTO: 33.2 % — SIGNIFICANT CHANGE UP (ref 20.5–51.1)
MAGNESIUM SERPL-MCNC: 1.8 MG/DL — SIGNIFICANT CHANGE UP (ref 1.8–2.4)
MCHC RBC-ENTMCNC: 29.3 PG — SIGNIFICANT CHANGE UP (ref 27–31)
MCHC RBC-ENTMCNC: 34.1 G/DL — SIGNIFICANT CHANGE UP (ref 32–37)
MCV RBC AUTO: 86 FL — SIGNIFICANT CHANGE UP (ref 81–99)
MONOCYTES # BLD AUTO: 0.79 K/UL — HIGH (ref 0.1–0.6)
MONOCYTES NFR BLD AUTO: 9.6 % — HIGH (ref 1.7–9.3)
NEUTROPHILS # BLD AUTO: 4.36 K/UL — SIGNIFICANT CHANGE UP (ref 1.4–6.5)
NEUTROPHILS NFR BLD AUTO: 53.3 % — SIGNIFICANT CHANGE UP (ref 42.2–75.2)
NRBC # BLD: 0 /100 WBCS — SIGNIFICANT CHANGE UP (ref 0–0)
P-ANCA SER-ACNC: NEGATIVE — SIGNIFICANT CHANGE UP
PHOSPHATE SERPL-MCNC: 3.9 MG/DL — SIGNIFICANT CHANGE UP (ref 2.1–4.9)
PLATELET # BLD AUTO: 209 K/UL — SIGNIFICANT CHANGE UP (ref 130–400)
POTASSIUM SERPL-MCNC: 4.4 MMOL/L — SIGNIFICANT CHANGE UP (ref 3.5–5)
POTASSIUM SERPL-SCNC: 4.4 MMOL/L — SIGNIFICANT CHANGE UP (ref 3.5–5)
PROT SERPL-MCNC: 6.9 G/DL — SIGNIFICANT CHANGE UP (ref 6–8)
RBC # BLD: 4.71 M/UL — SIGNIFICANT CHANGE UP (ref 4.2–5.4)
RBC # FLD: 12.9 % — SIGNIFICANT CHANGE UP (ref 11.5–14.5)
SODIUM SERPL-SCNC: 137 MMOL/L — SIGNIFICANT CHANGE UP (ref 135–146)
WBC # BLD: 8.19 K/UL — SIGNIFICANT CHANGE UP (ref 4.8–10.8)
WBC # FLD AUTO: 8.19 K/UL — SIGNIFICANT CHANGE UP (ref 4.8–10.8)

## 2022-05-15 PROCEDURE — 99233 SBSQ HOSP IP/OBS HIGH 50: CPT

## 2022-05-15 PROCEDURE — 99232 SBSQ HOSP IP/OBS MODERATE 35: CPT

## 2022-05-15 RX ORDER — ALPRAZOLAM 0.25 MG
1 TABLET ORAL EVERY 12 HOURS
Refills: 0 | Status: DISCONTINUED | OUTPATIENT
Start: 2022-05-15 | End: 2022-05-15

## 2022-05-15 RX ADMIN — MORPHINE SULFATE 120 MILLIGRAM(S): 50 CAPSULE, EXTENDED RELEASE ORAL at 06:12

## 2022-05-15 RX ADMIN — Medication 1 MILLIGRAM(S): at 06:02

## 2022-05-15 RX ADMIN — Medication 81 MILLIGRAM(S): at 11:02

## 2022-05-15 RX ADMIN — HEPARIN SODIUM 17500 UNIT(S): 5000 INJECTION INTRAVENOUS; SUBCUTANEOUS at 09:08

## 2022-05-15 RX ADMIN — PREGABALIN 1000 MICROGRAM(S): 225 CAPSULE ORAL at 11:02

## 2022-05-15 RX ADMIN — Medication 1 MILLIGRAM(S): at 11:02

## 2022-05-15 RX ADMIN — MORPHINE SULFATE 120 MILLIGRAM(S): 50 CAPSULE, EXTENDED RELEASE ORAL at 06:03

## 2022-05-15 RX ADMIN — Medication 100 MILLIGRAM(S): at 06:12

## 2022-05-15 RX ADMIN — MORPHINE SULFATE 120 MILLIGRAM(S): 50 CAPSULE, EXTENDED RELEASE ORAL at 13:59

## 2022-05-15 NOTE — PROGRESS NOTE ADULT - ASSESSMENT
51 year old woman with PMH of SLE  lupus with lupus anticoagulant, antiphospholipid syndrome,  rheumatoid arthritis, cervical cancer sp hysterectomy,  laryngeal cancer sp CT, RT, CHF s/p AICD+PPM, DVT/PE in 2015 on Fondaparinux, NSTEMI in 2015, subclavian stenosis s/p stent placement, HTN, HLD, PPM/AICD presented to the hospital for a wound on the calf of the left lower extremit    Assessment:  # Lower extremity lesion  # Skin lesions, suspected dermatitis with hemorrhagic bullae ?secondary to fondaparinux?  # Antiphospholipid syndrome: She could not maintain therapeutic INR on warfarin, she clotted on DOAC, she developed a rash and bruising on Lovenox, and this admission developed suspected hemorrhagic bullae from fondaparinux.  We discussed trying warfarin or Lovenox again, however patient refused. She agreed to therapeutic SQ heparin.     Plan:  - Therapeutic subcut heparin, s/p 1 time bolus of 333u/kg (~23,700) but developed bleeding shortly after and heparin injection was held.   - we explained to pt that we still prefer therapeutic lovenox as a safer and better alternative than therapeutic subcut heparin. Pt wants to try subcut heparin again.   - c/w heparin subcut 250u/kg (17,500u) BID  - She will be referred to [facility]  - Will consider Rituxan outpatient (pt allergic to Plaquenil)  - F/u biopsy results  - F/u with hematology outpatient  - Abx per primary  - Consider a second opinion at the SLE center at [facility].    Can be d/c from hematology perspective and f/u outpatient.     51 year old woman with PMH of SLE  lupus with lupus anticoagulant, antiphospholipid syndrome,  rheumatoid arthritis, cervical cancer sp hysterectomy,  laryngeal cancer sp CT, RT, CHF s/p AICD+PPM, DVT/PE in 2015 on Fondaparinux, NSTEMI in 2015, subclavian stenosis s/p stent placement, HTN, HLD, PPM/AICD presented to the hospital for a wound on the calf of the left lower extremit    Assessment:  # Lower extremity lesion secondary to cellulitis. That has healed well with antibiotherapy.  # Skin lesions, a few blackish spots, suspected dermatitis with hemorrhagic bullae ?secondary to fondaparinux?  # Antiphospholipid syndrome: She could not maintain therapeutic INR on warfarin, she clotted on DOAC, she developed subcutaneous lumps and bruising on Lovenox, and this admission developed suspected hemorrhagic bullae from fondaparinux.  We discussed trying warfarin or Lovenox again, however patient refused. She agreed to therapeutic SQ heparin.     Plan:  - Therapeutic subcut heparin, s/p 1 time bolus of 333u/kg (~23,700) but developed bleeding shortly after and heparin injection was held.   - we explained to pt that we still prefer therapeutic Lovenox as a safer and better alternative than therapeutic subcut heparin. Pt wants to try subcut heparin again.   - c/w heparin subcut 250u/kg (17,500u) BID  - She will be referred to [facility] for further evaluation  - Will consider Rituxan outpatient (pt allergic to Plaquenil) if needed.  - F/u biopsy results  - F/u with hematology outpatient  - Abx per primary  - Consider a second opinion at the SLE center at [facility].    Can be d/c from hematology perspective and f/u outpatient.

## 2022-05-15 NOTE — PROGRESS NOTE ADULT - SUBJECTIVE AND OBJECTIVE BOX
ILEANA MCNEIL 51y Female  MRN#: [id number]     SUBJECTIVE  Patient is a 51y old Female who presents with a chief complaint of left lower extremity wound (14 May 2022 06:01)    Today is hospital day 10d, and this morning she is lying in bed without distress.   No acute overnight events.     OBJECTIVE  PAST MEDICAL & SURGICAL HISTORY  Lupus    RA (rheumatoid arthritis)    HTN (hypertension)    CHF (congestive heart failure)    COPD (chronic obstructive pulmonary disease)    DVT (deep venous thrombosis)    Pulmonary embolism    History of laryngeal cancer    GERD (gastroesophageal reflux disease)    Cervical cancer    S/P appendectomy    S/P cholecystectomy    AICD (automatic cardioverter/defibrillator) present    S/P hysterectomy    History of back surgery    H/O foot surgery    S/P eye surgery    Subclavian arterial stenosis      ALLERGIES:  adhesives (Rash; Urticaria)  Arava (Anaphylaxis; Angioedema)  Avelox (Other)  Plaquenil (Other)  Vantin (Other (Mild))    MEDICATIONS:  STANDING MEDICATIONS  albuterol/ipratropium for Nebulization 3 milliLiter(s) Nebulizer every 6 hours  aspirin enteric coated 81 milliGRAM(s) Oral daily  budesonide 160 MICROgram(s)/formoterol 4.5 MICROgram(s) Inhaler 2 Puff(s) Inhalation two times a day  cyanocobalamin 1000 MICROGram(s) Oral daily  folic acid 1 milliGRAM(s) Oral daily  heparin   Injectable 80939 Unit(s) SubCutaneous every 12 hours  hydrALAZINE 50 milliGRAM(s) Oral two times a day  lidocaine 1%/epinephrine 1:100,000 Inj 20 milliLiter(s) Local Injection once  metoprolol succinate  milliGRAM(s) Oral daily  morphine ER Tablet 120 milliGRAM(s) Oral <User Schedule>    PRN MEDICATIONS  acetaminophen     Tablet .. 650 milliGRAM(s) Oral every 6 hours PRN  ALPRAZolam 1 milliGRAM(s) Oral every 12 hours PRN  aluminum hydroxide/magnesium hydroxide/simethicone Suspension 30 milliLiter(s) Oral every 4 hours PRN  melatonin 3 milliGRAM(s) Oral at bedtime PRN  ondansetron    Tablet 4 milliGRAM(s) Oral every 8 hours PRN  simethicone 80 milliGRAM(s) Chew two times a day PRN    HOME MEDICATIONS  Home Medications:  albuterol 90 mcg/inh inhalation aerosol: 2 puff(s) inhaled every 6 hours, As Needed (11 May 2022 16:03)  ALPRAZolam 1 mg oral tablet: 2 tab(s) orally 3 times a day, As Needed (05 May 2022 21:12)  cyanocobalamin 1000 mcg/mL injectable solution: 1 milliliter(s) injectable once a week (05 May 2022 21:12)  folic acid 1 mg oral tablet: 1 tab(s) orally once a day (05 May 2022 21:12)  hydrALAZINE 50 mg oral tablet: 1 tab(s) orally 2 times a day (05 May 2022 21:12)  Metoprolol Succinate  mg oral tablet, extended release: 1 tab(s) orally once a day (05 May 2022 21:12)  MS Contin 60 mg oral tablet, extended release: 2 tab(s) orally 3 times a day (after meals), As Needed (11 May 2022 16:03)  Trelegy Ellipta 100 mcg-62.5 mcg-25 mcg/inh inhalation powder: 1 puff(s) inhaled once a day (05 May 2022 21:12)      VITAL SIGNS: Last 24 Hours  T(C): 36.7 (15 May 2022 05:36), Max: 37.2 (14 May 2022 20:25)  T(F): 98.1 (15 May 2022 05:36), Max: 98.9 (14 May 2022 20:25)  HR: 67 (15 May 2022 05:36) (67 - 80)  BP: 101/54 (15 May 2022 05:36) (101/54 - 137/63)  BP(mean): 75 (15 May 2022 05:36) (75 - 75)  RR: 18 (15 May 2022 05:36) (18 - 18)  SpO2: --      LABS:                        13.8   8.19  )-----------( 209      ( 15 May 2022 06:06 )             40.5     05-15    137  |  99  |  15  ----------------------------<  100<H>  4.4   |  25  |  0.9    Ca    10.2<H>      15 May 2022 06:06  Phos  3.9     05-15  Mg     1.8     05-15    TPro  6.9  /  Alb  4.5  /  TBili  0.5  /  DBili  x   /  AST  15  /  ALT  11  /  AlkPhos  73  05-15    LIVER FUNCTIONS - ( 15 May 2022 06:06 )  Alb: 4.5 g/dL / Pro: 6.9 g/dL / ALK PHOS: 73 U/L / ALT: 11 U/L / AST: 15 U/L / GGT: x             CAPILLARY BLOOD GLUCOSE      RADIOLOGY:    PHYSICAL EXAM:  GENERAL: NAD, AAO x 4, 51y F  HEAD:  Atraumatic, Normocephalic  EYES: EOMI, conjunctiva clear and sclera white  NECK: Supple, No JVD  CHEST/LUNG: Clear to auscultation bilaterally; No wheeze; No crackles; No accessory muscles used  HEART: Regular rate and rhythm; No murmurs;   ABDOMEN: Soft, Nontender, Nondistended; Bowel sounds present; No guarding  EXTREMITIES:  2+ Peripheral Pulses, No cyanosis or edema  NEUROLOGY: non-focal    ADMISSION SUMMARY  Patient is a 51y old Female who presents with a chief complaint of left lower extremity wound (14 May 2022 06:01)    ILEANA MCNEIL 51y Female  MRN#: [id number]     SUBJECTIVE  Patient is a 51y old Female who presents with a chief complaint of left lower extremity wound (14 May 2022 06:01)    Today is hospital day 10d, and this morning she is lying in bed without distress.   No acute overnight events.     OBJECTIVE  PAST MEDICAL & SURGICAL HISTORY  Lupus    RA (rheumatoid arthritis)    HTN (hypertension)    CHF (congestive heart failure)    COPD (chronic obstructive pulmonary disease)    DVT (deep venous thrombosis)    Pulmonary embolism    History of laryngeal cancer    GERD (gastroesophageal reflux disease)    Cervical cancer    S/P appendectomy    S/P cholecystectomy    AICD (automatic cardioverter/defibrillator) present    S/P hysterectomy    History of back surgery    H/O foot surgery    S/P eye surgery    Subclavian arterial stenosis      ALLERGIES:  adhesives (Rash; Urticaria)  Arava (Anaphylaxis; Angioedema)  Avelox (Other)  Plaquenil (Other)  Vantin (Other (Mild))    MEDICATIONS:  STANDING MEDICATIONS  albuterol/ipratropium for Nebulization 3 milliLiter(s) Nebulizer every 6 hours  aspirin enteric coated 81 milliGRAM(s) Oral daily  budesonide 160 MICROgram(s)/formoterol 4.5 MICROgram(s) Inhaler 2 Puff(s) Inhalation two times a day  cyanocobalamin 1000 MICROGram(s) Oral daily  folic acid 1 milliGRAM(s) Oral daily  heparin   Injectable 37187 Unit(s) SubCutaneous every 12 hours  hydrALAZINE 50 milliGRAM(s) Oral two times a day  lidocaine 1%/epinephrine 1:100,000 Inj 20 milliLiter(s) Local Injection once  metoprolol succinate  milliGRAM(s) Oral daily  morphine ER Tablet 120 milliGRAM(s) Oral <User Schedule>    PRN MEDICATIONS  acetaminophen     Tablet .. 650 milliGRAM(s) Oral every 6 hours PRN  ALPRAZolam 1 milliGRAM(s) Oral every 12 hours PRN  aluminum hydroxide/magnesium hydroxide/simethicone Suspension 30 milliLiter(s) Oral every 4 hours PRN  melatonin 3 milliGRAM(s) Oral at bedtime PRN  ondansetron    Tablet 4 milliGRAM(s) Oral every 8 hours PRN  simethicone 80 milliGRAM(s) Chew two times a day PRN    HOME MEDICATIONS  Home Medications:  albuterol 90 mcg/inh inhalation aerosol: 2 puff(s) inhaled every 6 hours, As Needed (11 May 2022 16:03)  ALPRAZolam 1 mg oral tablet: 2 tab(s) orally 3 times a day, As Needed (05 May 2022 21:12)  cyanocobalamin 1000 mcg/mL injectable solution: 1 milliliter(s) injectable once a week (05 May 2022 21:12)  folic acid 1 mg oral tablet: 1 tab(s) orally once a day (05 May 2022 21:12)  hydrALAZINE 50 mg oral tablet: 1 tab(s) orally 2 times a day (05 May 2022 21:12)  Metoprolol Succinate  mg oral tablet, extended release: 1 tab(s) orally once a day (05 May 2022 21:12)  MS Contin 60 mg oral tablet, extended release: 2 tab(s) orally 3 times a day (after meals), As Needed (11 May 2022 16:03)  Trelegy Ellipta 100 mcg-62.5 mcg-25 mcg/inh inhalation powder: 1 puff(s) inhaled once a day (05 May 2022 21:12)      VITAL SIGNS: Last 24 Hours  T(C): 36.7 (15 May 2022 05:36), Max: 37.2 (14 May 2022 20:25)  T(F): 98.1 (15 May 2022 05:36), Max: 98.9 (14 May 2022 20:25)  HR: 67 (15 May 2022 05:36) (67 - 80)  BP: 101/54 (15 May 2022 05:36) (101/54 - 137/63)  BP(mean): 75 (15 May 2022 05:36) (75 - 75)  RR: 18 (15 May 2022 05:36) (18 - 18)  SpO2: --      LABS:                        13.8   8.19  )-----------( 209      ( 15 May 2022 06:06 )             40.5     05-15    137  |  99  |  15  ----------------------------<  100<H>  4.4   |  25  |  0.9    Ca    10.2<H>      15 May 2022 06:06  Phos  3.9     05-15  Mg     1.8     05-15    TPro  6.9  /  Alb  4.5  /  TBili  0.5  /  DBili  x   /  AST  15  /  ALT  11  /  AlkPhos  73  05-15    LIVER FUNCTIONS - ( 15 May 2022 06:06 )  Alb: 4.5 g/dL / Pro: 6.9 g/dL / ALK PHOS: 73 U/L / ALT: 11 U/L / AST: 15 U/L / GGT: x             CAPILLARY BLOOD GLUCOSE      RADIOLOGY:    PHYSICAL EXAM:  GENERAL: NAD, AAO x 4, 51y F  HEAD:  Atraumatic, Normocephalic  EYES: EOMI, conjunctiva clear and sclera white  NECK: Supple, No JVD  CHEST/LUNG: Clear to auscultation bilaterally; No wheeze; No crackles; No accessory muscles used  HEART: Regular rate and rhythm; No murmurs;   ABDOMEN: Soft, Nontender, Nondistended; Bowel sounds present; No guarding  EXTREMITIES:  2+ Peripheral Pulses, No cyanosis or edema. The wound has healed well.  NEUROLOGY: non-focal    ADMISSION SUMMARY  Patient is a 51y old Female who presents with a chief complaint of left lower extremity wound (14 May 2022 06:01)

## 2022-05-15 NOTE — PROGRESS NOTE ADULT - PROVIDER SPECIALTY LIST ADULT
Heme/Onc
Infectious Disease
Internal Medicine
Hospitalist
Infectious Disease
Infectious Disease
Internal Medicine
Internal Medicine
Infectious Disease
Internal Medicine

## 2022-05-15 NOTE — PROGRESS NOTE ADULT - ATTENDING COMMENTS
Patient also seen by myself. I agree with Dr. Trent's note above. Situation discussed with him, the patient and patient's .  All questions answered.
Pt has been seen and examined. Case and Plan discussed with Resident covering who knows this case from the week. I reviewed above note and agree with documentation. Pt is doing good today. She is being observed on Heparin for APS (h/x SLE) for which the plan per Heme is to d/c her home with heparin SQ BID. Medications subscribed to [facility] and must picked up today as [facility] is closed tomorrow. Discuss with Resident to send all needed needles and Rx today in preparation for d/c home tomorrow if remains stable.     LLE Wound 2/2 Hemorrhagic Dermatitis from Fundaparanox per chart review    Dispo: d/c planning sunday am / meds at [facility]     Vital Signs Last 24 Hrs  T(C): 36.6 (14 May 2022 14:36), Max: 37 (13 May 2022 20:13)  T(F): 97.8 (14 May 2022 14:36), Max: 98.6 (13 May 2022 20:13)  HR: 75 (14 May 2022 14:36) (68 - 76)  BP: 137/63 (14 May 2022 14:36) (135/77 - 137/63)  BP(mean): 91 (14 May 2022 05:08) (91 - 91)  RR: 18 (14 May 2022 14:36) (18 - 18)  SpO2: 96% (13 May 2022 20:13) (96% - 96%)                        13.2   7.99  )-----------( 199      ( 14 May 2022 11:53 )             40.0     05-13    136  |  101  |  14  ----------------------------<  112<H>  4.6   |  23  |  0.9    Ca    10.0      13 May 2022 06:15  Phos  3.7     05-13  Mg     1.8     05-13    TPro  7.2  /  Alb  4.5  /  TBili  0.5  /  DBili  x   /  AST  17  /  ALT  13  /  AlkPhos  75  05-13    MEDICATIONS  (STANDING):  albuterol/ipratropium for Nebulization 3 milliLiter(s) Nebulizer every 6 hours  aspirin enteric coated 81 milliGRAM(s) Oral daily  budesonide 160 MICROgram(s)/formoterol 4.5 MICROgram(s) Inhaler 2 Puff(s) Inhalation two times a day  cyanocobalamin 1000 MICROGram(s) Oral daily  folic acid 1 milliGRAM(s) Oral daily  heparin   Injectable 36385 Unit(s) SubCutaneous every 12 hours  hydrALAZINE 50 milliGRAM(s) Oral two times a day  lidocaine 1%/epinephrine 1:100,000 Inj 20 milliLiter(s) Local Injection once  metoprolol succinate  milliGRAM(s) Oral daily  morphine ER Tablet 120 milliGRAM(s) Oral <User Schedule>    MEDICATIONS  (PRN):  acetaminophen     Tablet .. 650 milliGRAM(s) Oral every 6 hours PRN Temp greater or equal to 38C (100.4F), Mild Pain (1 - 3)  aluminum hydroxide/magnesium hydroxide/simethicone Suspension 30 milliLiter(s) Oral every 4 hours PRN Dyspepsia  melatonin 3 milliGRAM(s) Oral at bedtime PRN Insomnia  ondansetron    Tablet 4 milliGRAM(s) Oral every 8 hours PRN Nausea and/or Vomiting  simethicone 80 milliGRAM(s) Chew two times a day PRN Indigestion

## 2022-05-15 NOTE — PROGRESS NOTE ADULT - REASON FOR ADMISSION
left lower extremity wound

## 2022-05-15 NOTE — CHART NOTE - NSCHARTNOTEFT_GEN_A_CORE
PT IS DISCHARGED HOME TODAY. SEE D/C SUMMARY
Patient found to have new onset right upper extremity bullous lesions     Per PM Rounds,  1. discharge order cancelled   2. burn consult - for new onset R upper extremity bullous lesion   3. derm consult - possible fondaparinux-induced skin lesions   4. heme onc - possible hemorraghic dermatosis due to fondaparinux (may change to lovenox) - requesting Dr. Barajas
Patient seen and examined with Dr. Sultana. Recommend therapeutic heparin, 250 units/kg q12 hours or the total dose divided by 3 every 8hours. Lovenox is an option if she agrees.

## 2022-05-18 RX ORDER — WARFARIN 5 MG/1
5 TABLET ORAL
Qty: 30 | Refills: 0 | Status: ACTIVE | COMMUNITY
Start: 2022-05-18 | End: 1900-01-01

## 2022-05-23 ENCOUNTER — APPOINTMENT (OUTPATIENT)
Dept: OTOLARYNGOLOGY | Facility: CLINIC | Age: 52
End: 2022-05-23
Payer: MEDICARE

## 2022-05-23 ENCOUNTER — LABORATORY RESULT (OUTPATIENT)
Age: 52
End: 2022-05-23

## 2022-05-23 ENCOUNTER — APPOINTMENT (OUTPATIENT)
Dept: HEMATOLOGY ONCOLOGY | Facility: CLINIC | Age: 52
End: 2022-05-23

## 2022-05-23 DIAGNOSIS — D68.61 ANTIPHOSPHOLIPID SYNDROME: ICD-10-CM

## 2022-05-23 DIAGNOSIS — Z95.828 PRESENCE OF OTHER VASCULAR IMPLANTS AND GRAFTS: ICD-10-CM

## 2022-05-23 DIAGNOSIS — M06.9 RHEUMATOID ARTHRITIS, UNSPECIFIED: ICD-10-CM

## 2022-05-23 DIAGNOSIS — B96.89 OTHER SPECIFIED BACTERIAL AGENTS AS THE CAUSE OF DISEASES CLASSIFIED ELSEWHERE: ICD-10-CM

## 2022-05-23 DIAGNOSIS — Z79.82 LONG TERM (CURRENT) USE OF ASPIRIN: ICD-10-CM

## 2022-05-23 DIAGNOSIS — Z85.21 PERSONAL HISTORY OF MALIGNANT NEOPLASM OF LARYNX: ICD-10-CM

## 2022-05-23 DIAGNOSIS — Z79.01 LONG TERM (CURRENT) USE OF ANTICOAGULANTS: ICD-10-CM

## 2022-05-23 DIAGNOSIS — Z00.00 ENCOUNTER FOR GENERAL ADULT MEDICAL EXAMINATION W/OUT ABNORMAL FINDINGS: ICD-10-CM

## 2022-05-23 DIAGNOSIS — Z95.810 PRESENCE OF AUTOMATIC (IMPLANTABLE) CARDIAC DEFIBRILLATOR: ICD-10-CM

## 2022-05-23 DIAGNOSIS — L03.116 CELLULITIS OF LEFT LOWER LIMB: ICD-10-CM

## 2022-05-23 DIAGNOSIS — I11.0 HYPERTENSIVE HEART DISEASE WITH HEART FAILURE: ICD-10-CM

## 2022-05-23 DIAGNOSIS — M79.662 PAIN IN LEFT LOWER LEG: ICD-10-CM

## 2022-05-23 DIAGNOSIS — I96 GANGRENE, NOT ELSEWHERE CLASSIFIED: ICD-10-CM

## 2022-05-23 DIAGNOSIS — I25.2 OLD MYOCARDIAL INFARCTION: ICD-10-CM

## 2022-05-23 DIAGNOSIS — J44.9 CHRONIC OBSTRUCTIVE PULMONARY DISEASE, UNSPECIFIED: ICD-10-CM

## 2022-05-23 DIAGNOSIS — I50.9 HEART FAILURE, UNSPECIFIED: ICD-10-CM

## 2022-05-23 DIAGNOSIS — Z86.718 PERSONAL HISTORY OF OTHER VENOUS THROMBOSIS AND EMBOLISM: ICD-10-CM

## 2022-05-23 DIAGNOSIS — E78.5 HYPERLIPIDEMIA, UNSPECIFIED: ICD-10-CM

## 2022-05-23 DIAGNOSIS — G89.4 CHRONIC PAIN SYNDROME: ICD-10-CM

## 2022-05-23 DIAGNOSIS — I50.32 CHRONIC DIASTOLIC (CONGESTIVE) HEART FAILURE: ICD-10-CM

## 2022-05-23 DIAGNOSIS — Z86.711 PERSONAL HISTORY OF PULMONARY EMBOLISM: ICD-10-CM

## 2022-05-23 DIAGNOSIS — Z85.41 PERSONAL HISTORY OF MALIGNANT NEOPLASM OF CERVIX UTERI: ICD-10-CM

## 2022-05-23 DIAGNOSIS — Z86.14 PERSONAL HISTORY OF METHICILLIN RESISTANT STAPHYLOCOCCUS AUREUS INFECTION: ICD-10-CM

## 2022-05-23 DIAGNOSIS — Z90.710 ACQUIRED ABSENCE OF BOTH CERVIX AND UTERUS: ICD-10-CM

## 2022-05-23 LAB
HCT VFR BLD CALC: 41 %
HGB BLD-MCNC: 13.6 G/DL
MCHC RBC-ENTMCNC: 29.1 PG
MCHC RBC-ENTMCNC: 33.2 G/DL
MCV RBC AUTO: 87.6 FL
PLATELET # BLD AUTO: 217 K/UL
PMV BLD: 10.4 FL
RBC # BLD: 4.68 M/UL
RBC # FLD: 13.2 %
WBC # FLD AUTO: 6.67 K/UL

## 2022-05-23 PROCEDURE — 99214 OFFICE O/P EST MOD 30 MIN: CPT | Mod: 25

## 2022-05-23 PROCEDURE — 31575 DIAGNOSTIC LARYNGOSCOPY: CPT

## 2022-05-23 RX ORDER — DEXLANSOPRAZOLE 30 MG/1
30 CAPSULE, DELAYED RELEASE ORAL
Qty: 30 | Refills: 2 | Status: ACTIVE | COMMUNITY
Start: 2022-05-23 | End: 1900-01-01

## 2022-05-23 NOTE — ASSESSMENT
[FreeTextEntry1] : laryngeal swelling. Episodes of laryngospasm. Recommended acid reflux control at this time. \par \par History and discussion with patient's .

## 2022-05-23 NOTE — HISTORY OF PRESENT ILLNESS
[FreeTextEntry1] : Patient presents today following up on hoarseness. Patient recently seen ER due to leg infection. Patient recently diagnosed with APS.

## 2022-05-24 LAB
ANION GAP SERPL CALC-SCNC: 12 MMOL/L
APTT BLD: 101.3 SEC
BUN SERPL-MCNC: 19 MG/DL
CALCIUM SERPL-MCNC: 10.1 MG/DL
CHLORIDE SERPL-SCNC: 98 MMOL/L
CO2 SERPL-SCNC: 26 MMOL/L
CREAT SERPL-MCNC: 1 MG/DL
EGFR: 68 ML/MIN/1.73M2
GLUCOSE SERPL-MCNC: 93 MG/DL
INR PPP: 4.16 RATIO
POTASSIUM SERPL-SCNC: 4.5 MMOL/L
PT BLD: >40 SEC
SODIUM SERPL-SCNC: 136 MMOL/L

## 2022-05-26 ENCOUNTER — OUTPATIENT (OUTPATIENT)
Dept: OUTPATIENT SERVICES | Facility: HOSPITAL | Age: 52
LOS: 1 days | Discharge: HOME | End: 2022-05-26
Payer: MEDICARE

## 2022-05-26 ENCOUNTER — APPOINTMENT (OUTPATIENT)
Dept: MEDICATION MANAGEMENT | Facility: CLINIC | Age: 52
End: 2022-05-26

## 2022-05-26 ENCOUNTER — OUTPATIENT (OUTPATIENT)
Dept: OUTPATIENT SERVICES | Facility: HOSPITAL | Age: 52
LOS: 1 days | Discharge: HOME | End: 2022-05-26

## 2022-05-26 ENCOUNTER — APPOINTMENT (OUTPATIENT)
Dept: BURN CARE | Facility: CLINIC | Age: 52
End: 2022-05-26
Payer: MEDICARE

## 2022-05-26 DIAGNOSIS — Z98.890 OTHER SPECIFIED POSTPROCEDURAL STATES: Chronic | ICD-10-CM

## 2022-05-26 DIAGNOSIS — Z90.49 ACQUIRED ABSENCE OF OTHER SPECIFIED PARTS OF DIGESTIVE TRACT: Chronic | ICD-10-CM

## 2022-05-26 DIAGNOSIS — Z90.710 ACQUIRED ABSENCE OF BOTH CERVIX AND UTERUS: Chronic | ICD-10-CM

## 2022-05-26 DIAGNOSIS — I77.1 STRICTURE OF ARTERY: Chronic | ICD-10-CM

## 2022-05-26 DIAGNOSIS — Z95.810 PRESENCE OF AUTOMATIC (IMPLANTABLE) CARDIAC DEFIBRILLATOR: Chronic | ICD-10-CM

## 2022-05-26 DIAGNOSIS — D68.61 ANTIPHOSPHOLIPID SYNDROME: ICD-10-CM

## 2022-05-26 DIAGNOSIS — R06.02 SHORTNESS OF BREATH: ICD-10-CM

## 2022-05-26 DIAGNOSIS — Z79.01 LONG TERM (CURRENT) USE OF ANTICOAGULANTS: ICD-10-CM

## 2022-05-26 LAB
INR PPP: 3 RATIO
POCT-PROTHROMBIN TIME: 35.7 SECS

## 2022-05-26 PROCEDURE — 97597 DBRDMT OPN WND 1ST 20 CM/<: CPT

## 2022-05-26 PROCEDURE — 71046 X-RAY EXAM CHEST 2 VIEWS: CPT | Mod: 26

## 2022-05-26 RX ORDER — NYSTATIN 100000 [USP'U]/ML
100000 SUSPENSION ORAL
Qty: 56 | Refills: 0 | Status: ACTIVE | COMMUNITY
Start: 2021-07-02 | End: 1900-01-01

## 2022-05-27 NOTE — PATIENT PROFILE ADULT. - TEACHING/LEARNING LEARNING PREFERENCES
[FreeTextEntry1] : f/u resistant HTN \par brought in BP flowsheet -- mean is 135/73 (up from 131/70 last visit) \par has been well generally\par no more orthostasis\par meds reviewed with pt \par labs done and reviewed - see below\par PCP Dr Ed Goldberg \par  skill demonstration/verbal instruction/written material

## 2022-05-31 DIAGNOSIS — Z02.9 ENCOUNTER FOR ADMINISTRATIVE EXAMINATIONS, UNSPECIFIED: ICD-10-CM

## 2022-05-31 DIAGNOSIS — R06.02 SHORTNESS OF BREATH: ICD-10-CM

## 2022-06-02 ENCOUNTER — APPOINTMENT (OUTPATIENT)
Dept: MEDICATION MANAGEMENT | Facility: CLINIC | Age: 52
End: 2022-06-02

## 2022-06-02 ENCOUNTER — APPOINTMENT (OUTPATIENT)
Dept: SURGERY | Facility: CLINIC | Age: 52
End: 2022-06-02

## 2022-06-02 ENCOUNTER — OUTPATIENT (OUTPATIENT)
Dept: OUTPATIENT SERVICES | Facility: HOSPITAL | Age: 52
LOS: 1 days | Discharge: HOME | End: 2022-06-02

## 2022-06-02 DIAGNOSIS — Z98.890 OTHER SPECIFIED POSTPROCEDURAL STATES: Chronic | ICD-10-CM

## 2022-06-02 DIAGNOSIS — Z90.49 ACQUIRED ABSENCE OF OTHER SPECIFIED PARTS OF DIGESTIVE TRACT: Chronic | ICD-10-CM

## 2022-06-02 DIAGNOSIS — Z90.710 ACQUIRED ABSENCE OF BOTH CERVIX AND UTERUS: Chronic | ICD-10-CM

## 2022-06-02 DIAGNOSIS — I77.1 STRICTURE OF ARTERY: Chronic | ICD-10-CM

## 2022-06-02 DIAGNOSIS — D68.61 ANTIPHOSPHOLIPID SYNDROME: ICD-10-CM

## 2022-06-02 DIAGNOSIS — Z95.810 PRESENCE OF AUTOMATIC (IMPLANTABLE) CARDIAC DEFIBRILLATOR: Chronic | ICD-10-CM

## 2022-06-02 DIAGNOSIS — Z79.01 LONG TERM (CURRENT) USE OF ANTICOAGULANTS: ICD-10-CM

## 2022-06-02 LAB
INR PPP: 3.3 RATIO
POCT-PROTHROMBIN TIME: 40 SECS
QUALITY CONTROL: YES

## 2022-06-07 ENCOUNTER — OUTPATIENT (OUTPATIENT)
Dept: OUTPATIENT SERVICES | Facility: HOSPITAL | Age: 52
LOS: 1 days | Discharge: HOME | End: 2022-06-07

## 2022-06-07 ENCOUNTER — APPOINTMENT (OUTPATIENT)
Dept: MEDICATION MANAGEMENT | Facility: CLINIC | Age: 52
End: 2022-06-07

## 2022-06-07 DIAGNOSIS — Z98.890 OTHER SPECIFIED POSTPROCEDURAL STATES: Chronic | ICD-10-CM

## 2022-06-07 DIAGNOSIS — D68.61 ANTIPHOSPHOLIPID SYNDROME: ICD-10-CM

## 2022-06-07 DIAGNOSIS — Z95.810 PRESENCE OF AUTOMATIC (IMPLANTABLE) CARDIAC DEFIBRILLATOR: Chronic | ICD-10-CM

## 2022-06-07 DIAGNOSIS — Z90.49 ACQUIRED ABSENCE OF OTHER SPECIFIED PARTS OF DIGESTIVE TRACT: Chronic | ICD-10-CM

## 2022-06-07 DIAGNOSIS — Z79.01 LONG TERM (CURRENT) USE OF ANTICOAGULANTS: ICD-10-CM

## 2022-06-07 DIAGNOSIS — I77.1 STRICTURE OF ARTERY: Chronic | ICD-10-CM

## 2022-06-07 DIAGNOSIS — Z90.710 ACQUIRED ABSENCE OF BOTH CERVIX AND UTERUS: Chronic | ICD-10-CM

## 2022-06-07 LAB
INR PPP: 3.1 RATIO
POCT-PROTHROMBIN TIME: 37.7 SECS
QUALITY CONTROL: YES

## 2022-06-09 ENCOUNTER — APPOINTMENT (OUTPATIENT)
Dept: BURN CARE | Facility: CLINIC | Age: 52
End: 2022-06-09

## 2022-06-14 ENCOUNTER — APPOINTMENT (OUTPATIENT)
Dept: MEDICATION MANAGEMENT | Facility: CLINIC | Age: 52
End: 2022-06-14

## 2022-06-14 ENCOUNTER — OUTPATIENT (OUTPATIENT)
Dept: OUTPATIENT SERVICES | Facility: HOSPITAL | Age: 52
LOS: 1 days | Discharge: HOME | End: 2022-06-14

## 2022-06-14 DIAGNOSIS — Z90.49 ACQUIRED ABSENCE OF OTHER SPECIFIED PARTS OF DIGESTIVE TRACT: Chronic | ICD-10-CM

## 2022-06-14 DIAGNOSIS — Z98.890 OTHER SPECIFIED POSTPROCEDURAL STATES: Chronic | ICD-10-CM

## 2022-06-14 DIAGNOSIS — I77.1 STRICTURE OF ARTERY: Chronic | ICD-10-CM

## 2022-06-14 DIAGNOSIS — D68.61 ANTIPHOSPHOLIPID SYNDROME: ICD-10-CM

## 2022-06-14 DIAGNOSIS — Z79.01 LONG TERM (CURRENT) USE OF ANTICOAGULANTS: ICD-10-CM

## 2022-06-14 DIAGNOSIS — Z95.810 PRESENCE OF AUTOMATIC (IMPLANTABLE) CARDIAC DEFIBRILLATOR: Chronic | ICD-10-CM

## 2022-06-14 DIAGNOSIS — Z90.710 ACQUIRED ABSENCE OF BOTH CERVIX AND UTERUS: Chronic | ICD-10-CM

## 2022-06-14 LAB
INR PPP: 3.8 RATIO
POCT-PROTHROMBIN TIME: 45.7 SECS
QUALITY CONTROL: YES

## 2022-06-16 ENCOUNTER — OUTPATIENT (OUTPATIENT)
Dept: OUTPATIENT SERVICES | Facility: HOSPITAL | Age: 52
LOS: 1 days | Discharge: HOME | End: 2022-06-16

## 2022-06-16 ENCOUNTER — APPOINTMENT (OUTPATIENT)
Dept: HEMATOLOGY ONCOLOGY | Facility: CLINIC | Age: 52
End: 2022-06-16
Payer: MEDICARE

## 2022-06-16 VITALS
HEART RATE: 94 BPM | SYSTOLIC BLOOD PRESSURE: 170 MMHG | BODY MASS INDEX: 27.64 KG/M2 | TEMPERATURE: 98.6 F | HEIGHT: 63 IN | RESPIRATION RATE: 16 BRPM | WEIGHT: 156 LBS | DIASTOLIC BLOOD PRESSURE: 77 MMHG

## 2022-06-16 DIAGNOSIS — Z95.810 PRESENCE OF AUTOMATIC (IMPLANTABLE) CARDIAC DEFIBRILLATOR: Chronic | ICD-10-CM

## 2022-06-16 DIAGNOSIS — I77.1 STRICTURE OF ARTERY: Chronic | ICD-10-CM

## 2022-06-16 DIAGNOSIS — Z90.710 ACQUIRED ABSENCE OF BOTH CERVIX AND UTERUS: Chronic | ICD-10-CM

## 2022-06-16 DIAGNOSIS — Z98.890 OTHER SPECIFIED POSTPROCEDURAL STATES: Chronic | ICD-10-CM

## 2022-06-16 DIAGNOSIS — D68.59 OTHER PRIMARY THROMBOPHILIA: ICD-10-CM

## 2022-06-16 DIAGNOSIS — Z90.49 ACQUIRED ABSENCE OF OTHER SPECIFIED PARTS OF DIGESTIVE TRACT: Chronic | ICD-10-CM

## 2022-06-16 DIAGNOSIS — D68.61 ANTIPHOSPHOLIPID SYNDROME: ICD-10-CM

## 2022-06-16 PROCEDURE — 99213 OFFICE O/P EST LOW 20 MIN: CPT

## 2022-06-17 DIAGNOSIS — M32.9 SYSTEMIC LUPUS ERYTHEMATOSUS, UNSPECIFIED: ICD-10-CM

## 2022-06-17 NOTE — HISTORY OF PRESENT ILLNESS
[de-identified] : The patient is coming for f/u today.\par She has started taking coumadin and has been in therapeutic range ever since she has started.\par She had biopsy of subcutaneous nodule on her Elbow, biopsy showed \par Surgical Pathology Report - Auth (Verified)\par \par Specimen(s) Submitted\par 1  Right arm\par 2  Right arm-DIF\par \par Final Diagnosis\par 1. Right arm,  shave biopsy\par - Ulcerated, hemorrhagic and inflamed subepidermal blister, purpura\par and perivascular infiltrate of lymphocytes, see note\par \par \par Note: Above findings can be seen in a traumatized    angiokeratoma\par or  hemangioma. An underlying dermatitis cannot be ruled out. Clinical\par correlation and follow-up are recommended.\par \par 2. Right arm,  shave biopsy (DIF)\par - Weak granular IgM at dermoepidermal junction\par - Negative C3, IgG and IgA\par - Nonspecific pattern\par \par Pt reports that the nodularity has improved on her lower extremities but she still has some tender nodules left on her left shin. She also reports that she has been oozing blood from the site of biopsy ever since she had it. \par She has lost weight but reports that its intentional. \par She seems to be up to date otherwise with her screenings.\par \par

## 2022-06-17 NOTE — PHYSICAL EXAM
[Ambulatory and capable of all self care but unable to carry out any work activities] : Status 2- Ambulatory and capable of all self care but unable to carry out any work activities. Up and about more than 50% of waking hours [Normal] : no peripheral adenopathy appreciated [de-identified] : Hoarse voice hardly audible. [de-identified] : Area of localized swelling with mild tenderness at the Leg below knne and above the ankle [de-identified] : Skin of the abdomen showing bruised areas from the Lovenox injections. [de-identified] : Left shoulder discomfort upon lifting. [de-identified] : Multiple, scattered areas of hematomas of various sizes. In addition, also scattered subcutaneous nodules on the limbs, with no discoloration for most of them but occasionally some suggestive of bruises too. Improved now [de-identified] : Anxious, fearful.

## 2022-06-17 NOTE — REVIEW OF SYSTEMS
[Fatigue] : fatigue [SOB on Exertion] : shortness of breath during exertion [Joint Pain] : joint pain [Joint Stiffness] : joint stiffness [Anxiety] : anxiety [Easy Bruising] : a tendency for easy bruising [Hoarseness] : hoarseness [Negative] : Cardiovascular [de-identified] : Multiple hematomas corresponding to the Lovenox injections in addition to scattered ones on her limbs [de-identified] : Generalized weakness

## 2022-06-17 NOTE — ASSESSMENT
[FreeTextEntry1] : 51 year old woman with PMH of lupus with lupus anticoagulant, rheumatoid arthritis, cervical cancer S/P hysterectomy, laryngeal cancer S/P CT, RT, CHF s/p AICD+PPM, DVT/PE in 2015 on Xarelto, NSTEMI in 2015, subclavian stenosis s/p stent placement, HTN, HLD, recent findings of an acute deep vein thrombosis of the left internal jugular, subclavian axillary and brachial veins. \par Was on Lovenox , then switched to Fondaparinux, developed skin nodules from that , biopsy showing ulcerated, hemorrhagic and inflamed subepidermal blister, purpura and perivascular infiltrate of lymphocytes , findings can be seen in a traumatized    angiokeratoma or  hemangioma. An underlying dermatitis cannot be ruled out.\par \par Now on Coumadin and is therapeutic and tolerating it well although she was initially reluctant because of her past experience.\par \par Biopsy results were discussed with pt in detail. She was told that it could be reaction from Fondaparinux but she was recommended to f/u with dermatology. \par Meanwhile she should continue with Coumadin as directed. \par Pt was examined by ENT and imaging ordered for next week. \par [place]. \par RTC in 2-3 months after her visit with the lupus specialist. \par \par Pt was seen and examined with Dr Sultana who agreed with plan of care. \par

## 2022-06-21 ENCOUNTER — APPOINTMENT (OUTPATIENT)
Dept: MEDICATION MANAGEMENT | Facility: CLINIC | Age: 52
End: 2022-06-21

## 2022-06-21 ENCOUNTER — OUTPATIENT (OUTPATIENT)
Dept: OUTPATIENT SERVICES | Facility: HOSPITAL | Age: 52
LOS: 1 days | Discharge: HOME | End: 2022-06-21
Payer: MEDICARE

## 2022-06-21 DIAGNOSIS — Z90.49 ACQUIRED ABSENCE OF OTHER SPECIFIED PARTS OF DIGESTIVE TRACT: Chronic | ICD-10-CM

## 2022-06-21 DIAGNOSIS — Z90.710 ACQUIRED ABSENCE OF BOTH CERVIX AND UTERUS: Chronic | ICD-10-CM

## 2022-06-21 DIAGNOSIS — D68.61 ANTIPHOSPHOLIPID SYNDROME: ICD-10-CM

## 2022-06-21 DIAGNOSIS — Z95.810 PRESENCE OF AUTOMATIC (IMPLANTABLE) CARDIAC DEFIBRILLATOR: Chronic | ICD-10-CM

## 2022-06-21 DIAGNOSIS — Z98.890 OTHER SPECIFIED POSTPROCEDURAL STATES: Chronic | ICD-10-CM

## 2022-06-21 DIAGNOSIS — I77.1 STRICTURE OF ARTERY: Chronic | ICD-10-CM

## 2022-06-21 DIAGNOSIS — Z79.01 LONG TERM (CURRENT) USE OF ANTICOAGULANTS: ICD-10-CM

## 2022-06-21 LAB
INR PPP: 2.2 RATIO
POCT-PROTHROMBIN TIME: 26.5 SECS
QUALITY CONTROL: YES

## 2022-06-22 ENCOUNTER — RESULT REVIEW (OUTPATIENT)
Age: 52
End: 2022-06-22

## 2022-06-22 ENCOUNTER — OUTPATIENT (OUTPATIENT)
Dept: OUTPATIENT SERVICES | Facility: HOSPITAL | Age: 52
LOS: 1 days | Discharge: HOME | End: 2022-06-22
Payer: MEDICARE

## 2022-06-22 DIAGNOSIS — Z98.890 OTHER SPECIFIED POSTPROCEDURAL STATES: Chronic | ICD-10-CM

## 2022-06-22 DIAGNOSIS — Z90.49 ACQUIRED ABSENCE OF OTHER SPECIFIED PARTS OF DIGESTIVE TRACT: Chronic | ICD-10-CM

## 2022-06-22 DIAGNOSIS — Z95.810 PRESENCE OF AUTOMATIC (IMPLANTABLE) CARDIAC DEFIBRILLATOR: Chronic | ICD-10-CM

## 2022-06-22 DIAGNOSIS — R49.0 DYSPHONIA: ICD-10-CM

## 2022-06-22 DIAGNOSIS — I77.1 STRICTURE OF ARTERY: Chronic | ICD-10-CM

## 2022-06-22 DIAGNOSIS — Z90.710 ACQUIRED ABSENCE OF BOTH CERVIX AND UTERUS: Chronic | ICD-10-CM

## 2022-06-22 DIAGNOSIS — Z12.31 ENCOUNTER FOR SCREENING MAMMOGRAM FOR MALIGNANT NEOPLASM OF BREAST: ICD-10-CM

## 2022-06-22 PROCEDURE — 70491 CT SOFT TISSUE NECK W/DYE: CPT | Mod: 26,ME

## 2022-06-22 PROCEDURE — 70460 CT HEAD/BRAIN W/DYE: CPT | Mod: 26,MH

## 2022-06-22 PROCEDURE — 77063 BREAST TOMOSYNTHESIS BI: CPT | Mod: 26

## 2022-06-22 PROCEDURE — G1004: CPT

## 2022-06-22 PROCEDURE — 77067 SCR MAMMO BI INCL CAD: CPT | Mod: 26

## 2022-06-24 ENCOUNTER — OUTPATIENT (OUTPATIENT)
Dept: OUTPATIENT SERVICES | Facility: HOSPITAL | Age: 52
LOS: 1 days | Discharge: HOME | End: 2022-06-24

## 2022-06-24 ENCOUNTER — RESULT REVIEW (OUTPATIENT)
Age: 52
End: 2022-06-24

## 2022-06-24 DIAGNOSIS — Z95.810 PRESENCE OF AUTOMATIC (IMPLANTABLE) CARDIAC DEFIBRILLATOR: Chronic | ICD-10-CM

## 2022-06-24 DIAGNOSIS — Z98.890 OTHER SPECIFIED POSTPROCEDURAL STATES: Chronic | ICD-10-CM

## 2022-06-24 DIAGNOSIS — I77.1 STRICTURE OF ARTERY: Chronic | ICD-10-CM

## 2022-06-24 DIAGNOSIS — Z90.49 ACQUIRED ABSENCE OF OTHER SPECIFIED PARTS OF DIGESTIVE TRACT: Chronic | ICD-10-CM

## 2022-06-24 DIAGNOSIS — Z90.710 ACQUIRED ABSENCE OF BOTH CERVIX AND UTERUS: Chronic | ICD-10-CM

## 2022-06-24 DIAGNOSIS — R06.00 DYSPNEA, UNSPECIFIED: ICD-10-CM

## 2022-06-24 PROCEDURE — 71260 CT THORAX DX C+: CPT | Mod: 26,MH

## 2022-06-27 ENCOUNTER — OUTPATIENT (OUTPATIENT)
Dept: OUTPATIENT SERVICES | Facility: HOSPITAL | Age: 52
LOS: 1 days | Discharge: HOME | End: 2022-06-27

## 2022-06-27 ENCOUNTER — APPOINTMENT (OUTPATIENT)
Age: 52
End: 2022-06-27

## 2022-06-27 ENCOUNTER — APPOINTMENT (OUTPATIENT)
Dept: MEDICATION MANAGEMENT | Facility: CLINIC | Age: 52
End: 2022-06-27

## 2022-06-27 VITALS
OXYGEN SATURATION: 98 % | SYSTOLIC BLOOD PRESSURE: 132 MMHG | RESPIRATION RATE: 14 BRPM | HEIGHT: 63 IN | BODY MASS INDEX: 27.46 KG/M2 | WEIGHT: 155 LBS | HEART RATE: 80 BPM | DIASTOLIC BLOOD PRESSURE: 78 MMHG

## 2022-06-27 DIAGNOSIS — Z90.710 ACQUIRED ABSENCE OF BOTH CERVIX AND UTERUS: Chronic | ICD-10-CM

## 2022-06-27 DIAGNOSIS — D49.0 NEOPLASM OF UNSPECIFIED BEHAVIOR OF DIGESTIVE SYSTEM: ICD-10-CM

## 2022-06-27 DIAGNOSIS — Z98.890 OTHER SPECIFIED POSTPROCEDURAL STATES: Chronic | ICD-10-CM

## 2022-06-27 DIAGNOSIS — D68.61 ANTIPHOSPHOLIPID SYNDROME: ICD-10-CM

## 2022-06-27 DIAGNOSIS — Z95.810 PRESENCE OF AUTOMATIC (IMPLANTABLE) CARDIAC DEFIBRILLATOR: Chronic | ICD-10-CM

## 2022-06-27 DIAGNOSIS — Z79.01 LONG TERM (CURRENT) USE OF ANTICOAGULANTS: ICD-10-CM

## 2022-06-27 DIAGNOSIS — Z90.49 ACQUIRED ABSENCE OF OTHER SPECIFIED PARTS OF DIGESTIVE TRACT: Chronic | ICD-10-CM

## 2022-06-27 DIAGNOSIS — J42 UNSPECIFIED CHRONIC BRONCHITIS: ICD-10-CM

## 2022-06-27 DIAGNOSIS — I77.1 STRICTURE OF ARTERY: Chronic | ICD-10-CM

## 2022-06-27 DIAGNOSIS — Z02.9 ENCOUNTER FOR ADMINISTRATIVE EXAMINATIONS, UNSPECIFIED: ICD-10-CM

## 2022-06-27 LAB
INR PPP: 1.9 RATIO
POCT-PROTHROMBIN TIME: 22.7 SECS
QUALITY CONTROL: YES

## 2022-06-27 PROCEDURE — 99213 OFFICE O/P EST LOW 20 MIN: CPT

## 2022-06-27 NOTE — DISCUSSION/SUMMARY
[FreeTextEntry1] : COPD STABLE ON TRELEGY\par METALLIC PE???/ REPEAT CT REVIEWED ON COUMADIN\par HEMO/ VASCULAR REVIEWED

## 2022-06-27 NOTE — PHYSICAL EXAM
[No Acute Distress] : no acute distress [Normal Oropharynx] : normal oropharynx [Normal Appearance] : normal appearance [No Neck Mass] : no neck mass [Normal Rate/Rhythm] : normal rate/rhythm [Normal S1, S2] : normal s1, s2 [No Murmurs] : no murmurs [No Abnormalities] : no abnormalities [Benign] : benign [Normal Gait] : normal gait [No Clubbing] : no clubbing [No Cyanosis] : no cyanosis [No Edema] : no edema [FROM] : FROM [Normal Color/ Pigmentation] : normal color/ pigmentation [No Focal Deficits] : no focal deficits [Oriented x3] : oriented x3 [Normal Affect] : normal affect [TextBox_68] : DEC BS BOTH BASES [TextBox_105] : APPLEE SWELLING

## 2022-06-28 ENCOUNTER — RESULT REVIEW (OUTPATIENT)
Age: 52
End: 2022-06-28

## 2022-06-28 ENCOUNTER — OUTPATIENT (OUTPATIENT)
Dept: OUTPATIENT SERVICES | Facility: HOSPITAL | Age: 52
LOS: 1 days | Discharge: HOME | End: 2022-06-28

## 2022-06-28 DIAGNOSIS — Z98.890 OTHER SPECIFIED POSTPROCEDURAL STATES: Chronic | ICD-10-CM

## 2022-06-28 DIAGNOSIS — I77.1 STRICTURE OF ARTERY: Chronic | ICD-10-CM

## 2022-06-28 DIAGNOSIS — M79.89 OTHER SPECIFIED SOFT TISSUE DISORDERS: ICD-10-CM

## 2022-06-28 DIAGNOSIS — I82.409 ACUTE EMBOLISM AND THROMBOSIS OF UNSPECIFIED DEEP VEINS OF UNSPECIFIED LOWER EXTREMITY: ICD-10-CM

## 2022-06-28 DIAGNOSIS — Z90.49 ACQUIRED ABSENCE OF OTHER SPECIFIED PARTS OF DIGESTIVE TRACT: Chronic | ICD-10-CM

## 2022-06-28 DIAGNOSIS — Z95.810 PRESENCE OF AUTOMATIC (IMPLANTABLE) CARDIAC DEFIBRILLATOR: Chronic | ICD-10-CM

## 2022-06-28 DIAGNOSIS — I65.29 OCCLUSION AND STENOSIS OF UNSPECIFIED CAROTID ARTERY: ICD-10-CM

## 2022-06-28 DIAGNOSIS — I42.7 CARDIOMYOPATHY DUE TO DRUG AND EXTERNAL AGENT: ICD-10-CM

## 2022-06-28 DIAGNOSIS — Z90.710 ACQUIRED ABSENCE OF BOTH CERVIX AND UTERUS: Chronic | ICD-10-CM

## 2022-06-28 PROCEDURE — 93970 EXTREMITY STUDY: CPT | Mod: 26

## 2022-07-07 ENCOUNTER — OUTPATIENT (OUTPATIENT)
Dept: OUTPATIENT SERVICES | Facility: HOSPITAL | Age: 52
LOS: 1 days | Discharge: HOME | End: 2022-07-07

## 2022-07-07 ENCOUNTER — APPOINTMENT (OUTPATIENT)
Dept: MEDICATION MANAGEMENT | Facility: CLINIC | Age: 52
End: 2022-07-07

## 2022-07-07 ENCOUNTER — APPOINTMENT (OUTPATIENT)
Dept: HEMATOLOGY ONCOLOGY | Facility: CLINIC | Age: 52
End: 2022-07-07

## 2022-07-07 DIAGNOSIS — Z98.890 OTHER SPECIFIED POSTPROCEDURAL STATES: Chronic | ICD-10-CM

## 2022-07-07 DIAGNOSIS — D68.61 ANTIPHOSPHOLIPID SYNDROME: ICD-10-CM

## 2022-07-07 DIAGNOSIS — Z90.49 ACQUIRED ABSENCE OF OTHER SPECIFIED PARTS OF DIGESTIVE TRACT: Chronic | ICD-10-CM

## 2022-07-07 DIAGNOSIS — Z79.01 LONG TERM (CURRENT) USE OF ANTICOAGULANTS: ICD-10-CM

## 2022-07-07 DIAGNOSIS — Z90.710 ACQUIRED ABSENCE OF BOTH CERVIX AND UTERUS: Chronic | ICD-10-CM

## 2022-07-07 DIAGNOSIS — Z95.810 PRESENCE OF AUTOMATIC (IMPLANTABLE) CARDIAC DEFIBRILLATOR: Chronic | ICD-10-CM

## 2022-07-07 DIAGNOSIS — I77.1 STRICTURE OF ARTERY: Chronic | ICD-10-CM

## 2022-07-15 ENCOUNTER — APPOINTMENT (OUTPATIENT)
Dept: MEDICATION MANAGEMENT | Facility: CLINIC | Age: 52
End: 2022-07-15

## 2022-07-19 ENCOUNTER — APPOINTMENT (OUTPATIENT)
Dept: MEDICATION MANAGEMENT | Facility: CLINIC | Age: 52
End: 2022-07-19

## 2022-07-19 ENCOUNTER — OUTPATIENT (OUTPATIENT)
Dept: OUTPATIENT SERVICES | Facility: HOSPITAL | Age: 52
LOS: 1 days | Discharge: HOME | End: 2022-07-19

## 2022-07-19 DIAGNOSIS — Z90.49 ACQUIRED ABSENCE OF OTHER SPECIFIED PARTS OF DIGESTIVE TRACT: Chronic | ICD-10-CM

## 2022-07-19 DIAGNOSIS — I77.1 STRICTURE OF ARTERY: Chronic | ICD-10-CM

## 2022-07-19 DIAGNOSIS — Z98.890 OTHER SPECIFIED POSTPROCEDURAL STATES: Chronic | ICD-10-CM

## 2022-07-19 DIAGNOSIS — Z79.01 LONG TERM (CURRENT) USE OF ANTICOAGULANTS: ICD-10-CM

## 2022-07-19 DIAGNOSIS — D68.61 ANTIPHOSPHOLIPID SYNDROME: ICD-10-CM

## 2022-07-19 DIAGNOSIS — Z90.710 ACQUIRED ABSENCE OF BOTH CERVIX AND UTERUS: Chronic | ICD-10-CM

## 2022-07-19 DIAGNOSIS — Z95.810 PRESENCE OF AUTOMATIC (IMPLANTABLE) CARDIAC DEFIBRILLATOR: Chronic | ICD-10-CM

## 2022-07-19 LAB
INR PPP: 1.9
INR PPP: 2 RATIO
POCT-PROTHROMBIN TIME: 23.4 SECS
PT BLD: 18.6
QUALITY CONTROL: YES

## 2022-07-20 ENCOUNTER — APPOINTMENT (OUTPATIENT)
Dept: CARDIOLOGY | Facility: CLINIC | Age: 52
End: 2022-07-20

## 2022-08-01 ENCOUNTER — NON-APPOINTMENT (OUTPATIENT)
Age: 52
End: 2022-08-01

## 2022-08-01 ENCOUNTER — APPOINTMENT (OUTPATIENT)
Dept: MEDICATION MANAGEMENT | Facility: CLINIC | Age: 52
End: 2022-08-01

## 2022-08-02 ENCOUNTER — APPOINTMENT (OUTPATIENT)
Dept: MEDICATION MANAGEMENT | Facility: CLINIC | Age: 52
End: 2022-08-02

## 2022-08-02 ENCOUNTER — APPOINTMENT (OUTPATIENT)
Dept: PAIN MANAGEMENT | Facility: CLINIC | Age: 52
End: 2022-08-02

## 2022-08-02 ENCOUNTER — OUTPATIENT (OUTPATIENT)
Dept: OUTPATIENT SERVICES | Facility: HOSPITAL | Age: 52
LOS: 1 days | Discharge: HOME | End: 2022-08-02

## 2022-08-02 VITALS
SYSTOLIC BLOOD PRESSURE: 151 MMHG | BODY MASS INDEX: 27.46 KG/M2 | HEIGHT: 63 IN | DIASTOLIC BLOOD PRESSURE: 83 MMHG | HEART RATE: 84 BPM | WEIGHT: 155 LBS

## 2022-08-02 DIAGNOSIS — D68.61 ANTIPHOSPHOLIPID SYNDROME: ICD-10-CM

## 2022-08-02 DIAGNOSIS — Z98.890 OTHER SPECIFIED POSTPROCEDURAL STATES: Chronic | ICD-10-CM

## 2022-08-02 DIAGNOSIS — I77.1 STRICTURE OF ARTERY: Chronic | ICD-10-CM

## 2022-08-02 DIAGNOSIS — Z90.710 ACQUIRED ABSENCE OF BOTH CERVIX AND UTERUS: Chronic | ICD-10-CM

## 2022-08-02 DIAGNOSIS — Z90.49 ACQUIRED ABSENCE OF OTHER SPECIFIED PARTS OF DIGESTIVE TRACT: Chronic | ICD-10-CM

## 2022-08-02 DIAGNOSIS — Z79.01 LONG TERM (CURRENT) USE OF ANTICOAGULANTS: ICD-10-CM

## 2022-08-02 DIAGNOSIS — Z95.810 PRESENCE OF AUTOMATIC (IMPLANTABLE) CARDIAC DEFIBRILLATOR: Chronic | ICD-10-CM

## 2022-08-02 LAB
AMPHET UR-MCNC: NORMAL
INR PPP: 2.5 RATIO
POCT-PROTHROMBIN TIME: 30.5 SECS
QUALITY CONTROL: YES

## 2022-08-02 PROCEDURE — 99213 OFFICE O/P EST LOW 20 MIN: CPT

## 2022-08-02 PROCEDURE — 80305 DRUG TEST PRSMV DIR OPT OBS: CPT | Mod: QW

## 2022-08-02 NOTE — DISCUSSION/SUMMARY
[de-identified] : I have consulted the  registry for the purpose of reviewing the patient's controlled substance.\par \par Urine drug screening collected today with rapid sample result consistent with given regimen. Sample to be sent for confirmatory testing with additional relief at a later time.\par \par The patient will return to the office in 4 weeks time and is aware to contact me with any question or concerns in the interim.\par \par Stephanie Bhatt PA-C \par Danii Burnette MD\par

## 2022-08-02 NOTE — ASSESSMENT
[FreeTextEntry1] : 51-year-old female with chronic pain. She has a history of lupus and many ongoing illnesses. She will continue with MS Contin 60 mg 2 tabs TID. I will send a RX for MS Contin 15 mg BID as well. RTO in 4 weeks.

## 2022-08-02 NOTE — HISTORY OF PRESENT ILLNESS
[FreeTextEntry1] : ORIGINAL PRESENTATION: Ms. Bauman is a 51-year-old woman who has been a patient in our office since 1996. At that time, she presented with complaints of lower back pain during pregnancy. Ultimately, the patient underwent a right L4-5 laminectomy over 20 years ago by Dr. Andre Carter. However, she still struggles with chronic pain. The patient has been medically managed at that and has trialed various preparations including Lortab, Vicodin, Dilaudid, Morphine, OxyContin, and methadone. She also has multiple complaints of pain secondary to a pacemaker having been placed. She also claims to have a history of throat cancer; however, we do not have any evidence of any cancer diagnosis. She also has a history of connective tissue disorders such as lupus and rheumatoid arthritis.\par \par TODAY: She is under our care for chronic pain secondary to lupus and lumbar issues which she is receiving continuing active treatment for. She is medically managed at our office. She remains on MS Contin 60 mg TID. She was supposed to start MS Contin 15 mg BID in addition to the 60 mg, which she never started due to her being away in [place] and the pharmacy never receiving the script. We will re-send it today. She reports significant pain despite being on a high dose of Morphine. She denies side effects from the medication.\par \par UDS, repeated today, 8/2/22 - see separate note.\par

## 2022-08-15 ENCOUNTER — OUTPATIENT (OUTPATIENT)
Dept: OUTPATIENT SERVICES | Facility: HOSPITAL | Age: 52
LOS: 1 days | Discharge: HOME | End: 2022-08-15

## 2022-08-15 ENCOUNTER — APPOINTMENT (OUTPATIENT)
Dept: MEDICATION MANAGEMENT | Facility: CLINIC | Age: 52
End: 2022-08-15

## 2022-08-15 DIAGNOSIS — Z79.01 LONG TERM (CURRENT) USE OF ANTICOAGULANTS: ICD-10-CM

## 2022-08-15 DIAGNOSIS — Z90.710 ACQUIRED ABSENCE OF BOTH CERVIX AND UTERUS: Chronic | ICD-10-CM

## 2022-08-15 DIAGNOSIS — Z95.810 PRESENCE OF AUTOMATIC (IMPLANTABLE) CARDIAC DEFIBRILLATOR: Chronic | ICD-10-CM

## 2022-08-15 DIAGNOSIS — Z98.890 OTHER SPECIFIED POSTPROCEDURAL STATES: Chronic | ICD-10-CM

## 2022-08-15 DIAGNOSIS — Z90.49 ACQUIRED ABSENCE OF OTHER SPECIFIED PARTS OF DIGESTIVE TRACT: Chronic | ICD-10-CM

## 2022-08-15 DIAGNOSIS — D68.61 ANTIPHOSPHOLIPID SYNDROME: ICD-10-CM

## 2022-08-15 DIAGNOSIS — I77.1 STRICTURE OF ARTERY: Chronic | ICD-10-CM

## 2022-08-15 LAB
INR PPP: 2.7 RATIO
POCT-PROTHROMBIN TIME: 32.8 SECS
QUALITY CONTROL: YES

## 2022-08-17 ENCOUNTER — APPOINTMENT (OUTPATIENT)
Dept: CARDIOLOGY | Facility: CLINIC | Age: 52
End: 2022-08-17

## 2022-08-17 ENCOUNTER — NON-APPOINTMENT (OUTPATIENT)
Age: 52
End: 2022-08-17

## 2022-08-17 PROCEDURE — 93295 DEV INTERROG REMOTE 1/2/MLT: CPT

## 2022-08-17 PROCEDURE — 93296 REM INTERROG EVL PM/IDS: CPT

## 2022-08-18 RX ORDER — RIVAROXABAN 2.5 MG/1
TABLET, FILM COATED ORAL
Refills: 0 | Status: DISCONTINUED | COMMUNITY
End: 2022-08-18

## 2022-08-23 RX ORDER — AMOXICILLIN AND CLAVULANATE POTASSIUM 875; 125 MG/1; MG/1
875-125 TABLET, COATED ORAL
Qty: 14 | Refills: 0 | Status: ACTIVE | COMMUNITY
Start: 2022-08-23 | End: 1900-01-01

## 2022-08-25 ENCOUNTER — APPOINTMENT (OUTPATIENT)
Dept: HEMATOLOGY ONCOLOGY | Facility: CLINIC | Age: 52
End: 2022-08-25

## 2022-08-25 VITALS
RESPIRATION RATE: 16 BRPM | WEIGHT: 155 LBS | BODY MASS INDEX: 27.46 KG/M2 | TEMPERATURE: 97.5 F | HEART RATE: 78 BPM | SYSTOLIC BLOOD PRESSURE: 188 MMHG | HEIGHT: 63 IN | DIASTOLIC BLOOD PRESSURE: 84 MMHG

## 2022-08-25 PROCEDURE — 99213 OFFICE O/P EST LOW 20 MIN: CPT

## 2022-08-26 NOTE — PHYSICAL EXAM
[Ambulatory and capable of all self care but unable to carry out any work activities] : Status 2- Ambulatory and capable of all self care but unable to carry out any work activities. Up and about more than 50% of waking hours [Normal] : full range of motion and no deformities appreciated [de-identified] : Hoarse voice hardly audible. [de-identified] : See below [de-identified] : palpable collection on her left calf with tenderness and notable swelling. but no erythema  [de-identified] : Anxious

## 2022-08-26 NOTE — HISTORY OF PRESENT ILLNESS
[de-identified] : 6/16/22\par The patient is coming for f/u today. \par She has started taking coumadin and has been in therapeutic range ever since she has started.\par She had biopsy of subcutaneous nodule on her Elbow, biopsy showed \par Surgical Pathology Report - Auth (Verified)\par \par Specimen(s) Submitted\par 1  Right arm\par 2  Right arm-DIF\par \par Final Diagnosis\par 1. Right arm,  shave biopsy\par - Ulcerated, hemorrhagic and inflamed subepidermal blister, purpura\par and perivascular infiltrate of lymphocytes, see note\par \par \par Note: Above findings can be seen in a traumatized    angiokeratoma\par or  hemangioma. An underlying dermatitis cannot be ruled out. Clinical\par correlation and follow-up are recommended.\par \par 2. Right arm,  shave biopsy (DIF)\par - Weak granular IgM at dermoepidermal junction\par - Negative C3, IgG and IgA\par - Nonspecific pattern\par \par Pt reports that the nodularity has improved on her lower extremities but she still has some tender nodules left on her left shin. She also reports that she has been oozing blood from the site of biopsy ever since she had it. \par She has lost weight but reports that its intentional. \par She seems to be up to date otherwise with her screenings.\par \par 8/25/22\par The patient returns today for follow up. She is on Coumadin. She followed up with a lupus specialist in the city and was told she only has a positive lupus anticoagulant which we already knew about. Today, she notes that she has pain in the left lower ext, similar to where she had skin changes during her May hospitalization. She endorses a palpable collection on her left calf with tenderness and notable swelling. \par

## 2022-08-26 NOTE — REVIEW OF SYSTEMS
[Fatigue] : fatigue [Hoarseness] : hoarseness [SOB on Exertion] : shortness of breath during exertion [Joint Pain] : joint pain [Joint Stiffness] : joint stiffness [Anxiety] : anxiety [Easy Bruising] : a tendency for easy bruising [Negative] : Genitourinary [de-identified] : Generalized weakness

## 2022-08-26 NOTE — ASSESSMENT
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 \par \par She was on Lovenox , then switched to Fondaparinux, developed skin nodules from that , biopsy showing ulcerated, hemorrhagic and inflamed subepidermal blister, purpura and perivascular infiltrate of lymphocytes , findings can be seen in a traumatized    angiokeratoma or  hemangioma. An underlying dermatitis cannot be ruled out. Biopsy results were previously discussed with pt in detail. \par \par She remains on Coumadin and is therapeutic and tolerating it well \par \par Pt was examined by ENT and imaging was done in June 2022 \par She will follow up with her Lupus specialist in the City in 4 months. However, the patient does not have a confirmed SLE but has lupus anticoagulant.\par She was ordered a repeat US duplex of b/l lower ext to reevaluate the subcutaneous collection that was noted on her lower ext duplex of the LLE in June 2022 given her new symptoms. \par Further recommendations once the above study is completed. \par \par Pt was seen and examined with Dr Sultana who agreed with plan of care. \par

## 2022-08-28 NOTE — DISCHARGE NOTE PROVIDER - NSDCHHHOMEBOUND_GEN_ALL_CORE
Follow up with the dermatologist in 5-7 days.  You can apply over the counter Cortizone-10 cream 1 application twice a day for 1 week.  If you experience any new or worsening symptoms or if you are concerned you can always come back to the emergency for a re-evaluation.  If there were any prescriptions given to you during the visit today take them as prescribed. If you have any questions you can ask the pharmacist. Pain greater than 7 on scale of 10 on ambulation

## 2022-08-30 ENCOUNTER — OUTPATIENT (OUTPATIENT)
Dept: OUTPATIENT SERVICES | Facility: HOSPITAL | Age: 52
LOS: 1 days | Discharge: HOME | End: 2022-08-30

## 2022-08-30 DIAGNOSIS — R22.42 LOCALIZED SWELLING, MASS AND LUMP, LEFT LOWER LIMB: ICD-10-CM

## 2022-08-30 DIAGNOSIS — Z98.890 OTHER SPECIFIED POSTPROCEDURAL STATES: Chronic | ICD-10-CM

## 2022-08-30 DIAGNOSIS — I77.1 STRICTURE OF ARTERY: Chronic | ICD-10-CM

## 2022-08-30 DIAGNOSIS — Z90.49 ACQUIRED ABSENCE OF OTHER SPECIFIED PARTS OF DIGESTIVE TRACT: Chronic | ICD-10-CM

## 2022-08-30 DIAGNOSIS — Z95.810 PRESENCE OF AUTOMATIC (IMPLANTABLE) CARDIAC DEFIBRILLATOR: Chronic | ICD-10-CM

## 2022-08-30 DIAGNOSIS — Z90.710 ACQUIRED ABSENCE OF BOTH CERVIX AND UTERUS: Chronic | ICD-10-CM

## 2022-08-30 DIAGNOSIS — L03.116 CELLULITIS OF LEFT LOWER LIMB: ICD-10-CM

## 2022-08-30 PROCEDURE — 93970 EXTREMITY STUDY: CPT | Mod: 26

## 2022-09-08 ENCOUNTER — APPOINTMENT (OUTPATIENT)
Dept: MEDICATION MANAGEMENT | Facility: CLINIC | Age: 52
End: 2022-09-08

## 2022-09-08 ENCOUNTER — OUTPATIENT (OUTPATIENT)
Dept: OUTPATIENT SERVICES | Facility: HOSPITAL | Age: 52
LOS: 1 days | Discharge: HOME | End: 2022-09-08

## 2022-09-08 DIAGNOSIS — Z90.49 ACQUIRED ABSENCE OF OTHER SPECIFIED PARTS OF DIGESTIVE TRACT: Chronic | ICD-10-CM

## 2022-09-08 DIAGNOSIS — Z95.810 PRESENCE OF AUTOMATIC (IMPLANTABLE) CARDIAC DEFIBRILLATOR: Chronic | ICD-10-CM

## 2022-09-08 DIAGNOSIS — Z98.890 OTHER SPECIFIED POSTPROCEDURAL STATES: Chronic | ICD-10-CM

## 2022-09-08 DIAGNOSIS — Z79.01 LONG TERM (CURRENT) USE OF ANTICOAGULANTS: ICD-10-CM

## 2022-09-08 DIAGNOSIS — I77.1 STRICTURE OF ARTERY: Chronic | ICD-10-CM

## 2022-09-08 DIAGNOSIS — Z90.710 ACQUIRED ABSENCE OF BOTH CERVIX AND UTERUS: Chronic | ICD-10-CM

## 2022-09-08 DIAGNOSIS — D68.61 ANTIPHOSPHOLIPID SYNDROME: ICD-10-CM

## 2022-09-08 LAB
INR PPP: 2.1 RATIO
POCT-PROTHROMBIN TIME: 25.4 SECS
QUALITY CONTROL: YES

## 2022-09-09 ENCOUNTER — APPOINTMENT (OUTPATIENT)
Dept: PAIN MANAGEMENT | Facility: CLINIC | Age: 52
End: 2022-09-09

## 2022-09-12 NOTE — PATIENT PROFILE ADULT. - HEALTHCARE QUESTIONS, PROFILE
Discharge Instructions for Thoracentesis    Thoracentesis is a procedure that removes extra fluid from the pleural space. The space is between the outside surface of the lungs (pleura) and the chest wall. The extra fluid is called pleural effusion. The procedure may be done to take a sample of the fluid. This is so it can be tested to help find the cause. Or it may be done to drain the extra fluid if you are having trouble breathing.     Home care  You may resume your home medications, as directed. You may resume your blood thinners in 24 hours.   You may resume a normal diet.   No heavy lifting or steneous activity for 48 hours after the procedure. Resume normal activities as tolerated.  You may remove your bandage in 24 hours.   You may shower after your bandage is removed. No submerging (tubs, hot tubs, swimming) in water until the puncture site is healed.  You may have some pain after the procedure. Please take tylenol or ibuprofen as needed.    Follow-up  Please follow up with your primary healthcare provider as needed. If labs were ordered on your thoracentesis fluid, the healthcare provider who order those labs, will contact you with the results.     When to call your healthcare provider  Please call your healthcare provider at [phone] if you experience any of the following:   Coughing up blood  Chest pain (if chest pain suddenly gets worse, call [phone])  Shortness of breath  Fever of 100.4°F (38°C) or higher, or as directed by your provider  Pain that doesn't get better after taking pain medicine  Signs of infection at the puncture site. These include increased pain, redness, warmth, swelling, or fluid leaking that is green or yellow or smells bad  Fluid draining from the puncture site  Bleeding from the puncture site   
none

## 2022-09-26 NOTE — H&P PST ADULT - NS PRO LACT YNNA
Patient presented after waiting 30 minutes with no reaction to  injections. Discharged from clinic.    Allie ZABALA RN  Specialty/Allergy Clinics      
no

## 2022-09-29 ENCOUNTER — APPOINTMENT (OUTPATIENT)
Dept: MEDICATION MANAGEMENT | Facility: CLINIC | Age: 52
End: 2022-09-29

## 2022-10-13 ENCOUNTER — OUTPATIENT (OUTPATIENT)
Dept: OUTPATIENT SERVICES | Facility: HOSPITAL | Age: 52
LOS: 1 days | End: 2022-10-13

## 2022-10-13 ENCOUNTER — APPOINTMENT (OUTPATIENT)
Dept: HEMATOLOGY ONCOLOGY | Facility: CLINIC | Age: 52
End: 2022-10-13

## 2022-10-13 DIAGNOSIS — Z98.890 OTHER SPECIFIED POSTPROCEDURAL STATES: Chronic | ICD-10-CM

## 2022-10-13 DIAGNOSIS — D68.59 OTHER PRIMARY THROMBOPHILIA: ICD-10-CM

## 2022-10-13 DIAGNOSIS — Z95.810 PRESENCE OF AUTOMATIC (IMPLANTABLE) CARDIAC DEFIBRILLATOR: Chronic | ICD-10-CM

## 2022-10-13 DIAGNOSIS — Z90.710 ACQUIRED ABSENCE OF BOTH CERVIX AND UTERUS: Chronic | ICD-10-CM

## 2022-10-13 DIAGNOSIS — D68.62 LUPUS ANTICOAGULANT SYNDROME: ICD-10-CM

## 2022-10-13 DIAGNOSIS — Z90.49 ACQUIRED ABSENCE OF OTHER SPECIFIED PARTS OF DIGESTIVE TRACT: Chronic | ICD-10-CM

## 2022-10-13 DIAGNOSIS — I77.1 STRICTURE OF ARTERY: Chronic | ICD-10-CM

## 2022-10-13 PROCEDURE — 99212 OFFICE O/P EST SF 10 MIN: CPT

## 2022-10-17 DIAGNOSIS — D68.61 ANTIPHOSPHOLIPID SYNDROME: ICD-10-CM

## 2022-10-18 ENCOUNTER — INPATIENT (INPATIENT)
Facility: HOSPITAL | Age: 52
LOS: 8 days | Discharge: HOME | End: 2022-10-27
Attending: HOSPITALIST | Admitting: HOSPITALIST
Payer: MEDICARE

## 2022-10-18 VITALS
HEART RATE: 71 BPM | TEMPERATURE: 98 F | HEIGHT: 63 IN | DIASTOLIC BLOOD PRESSURE: 67 MMHG | SYSTOLIC BLOOD PRESSURE: 147 MMHG | RESPIRATION RATE: 20 BRPM | WEIGHT: 151.9 LBS | OXYGEN SATURATION: 99 %

## 2022-10-18 DIAGNOSIS — Z95.810 PRESENCE OF AUTOMATIC (IMPLANTABLE) CARDIAC DEFIBRILLATOR: Chronic | ICD-10-CM

## 2022-10-18 DIAGNOSIS — Z98.890 OTHER SPECIFIED POSTPROCEDURAL STATES: Chronic | ICD-10-CM

## 2022-10-18 DIAGNOSIS — Z90.49 ACQUIRED ABSENCE OF OTHER SPECIFIED PARTS OF DIGESTIVE TRACT: Chronic | ICD-10-CM

## 2022-10-18 DIAGNOSIS — I77.1 STRICTURE OF ARTERY: Chronic | ICD-10-CM

## 2022-10-18 DIAGNOSIS — Z90.710 ACQUIRED ABSENCE OF BOTH CERVIX AND UTERUS: Chronic | ICD-10-CM

## 2022-10-18 LAB
ALBUMIN SERPL ELPH-MCNC: 4.6 G/DL — SIGNIFICANT CHANGE UP (ref 3.5–5.2)
ALP SERPL-CCNC: 75 U/L — SIGNIFICANT CHANGE UP (ref 30–115)
ALT FLD-CCNC: 39 U/L — SIGNIFICANT CHANGE UP (ref 0–41)
ANION GAP SERPL CALC-SCNC: 8 MMOL/L — SIGNIFICANT CHANGE UP (ref 7–14)
APPEARANCE UR: CLEAR — SIGNIFICANT CHANGE UP
APTT BLD: 65.8 SEC — HIGH (ref 27–39.2)
AST SERPL-CCNC: 34 U/L — SIGNIFICANT CHANGE UP (ref 0–41)
BACTERIA # UR AUTO: ABNORMAL
BASOPHILS # BLD AUTO: 0.04 K/UL — SIGNIFICANT CHANGE UP (ref 0–0.2)
BASOPHILS NFR BLD AUTO: 0.5 % — SIGNIFICANT CHANGE UP (ref 0–1)
BILIRUB SERPL-MCNC: 0.4 MG/DL — SIGNIFICANT CHANGE UP (ref 0.2–1.2)
BILIRUB UR-MCNC: NEGATIVE — SIGNIFICANT CHANGE UP
BLD GP AB SCN SERPL QL: SIGNIFICANT CHANGE UP
BUN SERPL-MCNC: 12 MG/DL — SIGNIFICANT CHANGE UP (ref 10–20)
CALCIUM SERPL-MCNC: 9.5 MG/DL — SIGNIFICANT CHANGE UP (ref 8.4–10.5)
CHLORIDE SERPL-SCNC: 99 MMOL/L — SIGNIFICANT CHANGE UP (ref 98–110)
CO2 SERPL-SCNC: 31 MMOL/L — SIGNIFICANT CHANGE UP (ref 17–32)
COLOR SPEC: YELLOW — SIGNIFICANT CHANGE UP
CREAT SERPL-MCNC: 1 MG/DL — SIGNIFICANT CHANGE UP (ref 0.7–1.5)
DIFF PNL FLD: ABNORMAL
EGFR: 68 ML/MIN/1.73M2 — SIGNIFICANT CHANGE UP
EOSINOPHIL # BLD AUTO: 0.16 K/UL — SIGNIFICANT CHANGE UP (ref 0–0.7)
EOSINOPHIL NFR BLD AUTO: 2 % — SIGNIFICANT CHANGE UP (ref 0–8)
EPI CELLS # UR: 14 /HPF — HIGH (ref 0–5)
GLUCOSE SERPL-MCNC: 102 MG/DL — HIGH (ref 70–99)
GLUCOSE UR QL: SIGNIFICANT CHANGE UP
HCT VFR BLD CALC: 39.5 % — SIGNIFICANT CHANGE UP (ref 37–47)
HGB BLD-MCNC: 13.1 G/DL — SIGNIFICANT CHANGE UP (ref 12–16)
HYALINE CASTS # UR AUTO: 5 /LPF — SIGNIFICANT CHANGE UP (ref 0–7)
IMM GRANULOCYTES NFR BLD AUTO: 0.6 % — HIGH (ref 0.1–0.3)
INR BLD: 0.97 RATIO — SIGNIFICANT CHANGE UP (ref 0.65–1.3)
KETONES UR-MCNC: SIGNIFICANT CHANGE UP
LEUKOCYTE ESTERASE UR-ACNC: ABNORMAL
LYMPHOCYTES # BLD AUTO: 2.05 K/UL — SIGNIFICANT CHANGE UP (ref 1.2–3.4)
LYMPHOCYTES # BLD AUTO: 26.1 % — SIGNIFICANT CHANGE UP (ref 20.5–51.1)
MCHC RBC-ENTMCNC: 29.4 PG — SIGNIFICANT CHANGE UP (ref 27–31)
MCHC RBC-ENTMCNC: 33.2 G/DL — SIGNIFICANT CHANGE UP (ref 32–37)
MCV RBC AUTO: 88.6 FL — SIGNIFICANT CHANGE UP (ref 81–99)
MONOCYTES # BLD AUTO: 0.59 K/UL — SIGNIFICANT CHANGE UP (ref 0.1–0.6)
MONOCYTES NFR BLD AUTO: 7.5 % — SIGNIFICANT CHANGE UP (ref 1.7–9.3)
NEUTROPHILS # BLD AUTO: 4.97 K/UL — SIGNIFICANT CHANGE UP (ref 1.4–6.5)
NEUTROPHILS NFR BLD AUTO: 63.3 % — SIGNIFICANT CHANGE UP (ref 42.2–75.2)
NITRITE UR-MCNC: NEGATIVE — SIGNIFICANT CHANGE UP
NRBC # BLD: 0 /100 WBCS — SIGNIFICANT CHANGE UP (ref 0–0)
PH UR: 6.5 — SIGNIFICANT CHANGE UP (ref 5–8)
PLATELET # BLD AUTO: 235 K/UL — SIGNIFICANT CHANGE UP (ref 130–400)
POTASSIUM SERPL-MCNC: 4.1 MMOL/L — SIGNIFICANT CHANGE UP (ref 3.5–5)
POTASSIUM SERPL-SCNC: 4.1 MMOL/L — SIGNIFICANT CHANGE UP (ref 3.5–5)
PROT SERPL-MCNC: 7 G/DL — SIGNIFICANT CHANGE UP (ref 6–8)
PROT UR-MCNC: ABNORMAL
PROTHROM AB SERPL-ACNC: 11 SEC — SIGNIFICANT CHANGE UP (ref 9.95–12.87)
RBC # BLD: 4.46 M/UL — SIGNIFICANT CHANGE UP (ref 4.2–5.4)
RBC # FLD: 13.4 % — SIGNIFICANT CHANGE UP (ref 11.5–14.5)
RBC CASTS # UR COMP ASSIST: 12 /HPF — HIGH (ref 0–4)
SODIUM SERPL-SCNC: 138 MMOL/L — SIGNIFICANT CHANGE UP (ref 135–146)
SP GR SPEC: 1.03 — SIGNIFICANT CHANGE UP (ref 1.01–1.03)
UROBILINOGEN FLD QL: ABNORMAL
WBC # BLD: 7.86 K/UL — SIGNIFICANT CHANGE UP (ref 4.8–10.8)
WBC # FLD AUTO: 7.86 K/UL — SIGNIFICANT CHANGE UP (ref 4.8–10.8)
WBC UR QL: 4 /HPF — SIGNIFICANT CHANGE UP (ref 0–5)

## 2022-10-18 PROCEDURE — 93010 ELECTROCARDIOGRAM REPORT: CPT

## 2022-10-18 PROCEDURE — 99223 1ST HOSP IP/OBS HIGH 75: CPT | Mod: AI

## 2022-10-18 PROCEDURE — 74177 CT ABD & PELVIS W/CONTRAST: CPT | Mod: 26,MA

## 2022-10-18 PROCEDURE — 71045 X-RAY EXAM CHEST 1 VIEW: CPT | Mod: 26

## 2022-10-18 PROCEDURE — 99285 EMERGENCY DEPT VISIT HI MDM: CPT

## 2022-10-18 RX ORDER — VANCOMYCIN HCL 1 G
750 VIAL (EA) INTRAVENOUS EVERY 12 HOURS
Refills: 0 | Status: DISCONTINUED | OUTPATIENT
Start: 2022-10-18 | End: 2022-10-20

## 2022-10-18 RX ORDER — ALPRAZOLAM 0.25 MG
2 TABLET ORAL THREE TIMES A DAY
Refills: 0 | Status: DISCONTINUED | OUTPATIENT
Start: 2022-10-18 | End: 2022-10-25

## 2022-10-18 RX ORDER — VANCOMYCIN HCL 1 G
1000 VIAL (EA) INTRAVENOUS ONCE
Refills: 0 | Status: COMPLETED | OUTPATIENT
Start: 2022-10-18 | End: 2022-10-18

## 2022-10-18 RX ORDER — BUDESONIDE AND FORMOTEROL FUMARATE DIHYDRATE 160; 4.5 UG/1; UG/1
2 AEROSOL RESPIRATORY (INHALATION)
Refills: 0 | Status: DISCONTINUED | OUTPATIENT
Start: 2022-10-18 | End: 2022-10-27

## 2022-10-18 RX ORDER — METRONIDAZOLE 500 MG
500 TABLET ORAL ONCE
Refills: 0 | Status: COMPLETED | OUTPATIENT
Start: 2022-10-18 | End: 2022-10-18

## 2022-10-18 RX ORDER — MORPHINE SULFATE 50 MG/1
60 CAPSULE, EXTENDED RELEASE ORAL THREE TIMES A DAY
Refills: 0 | Status: DISCONTINUED | OUTPATIENT
Start: 2022-10-18 | End: 2022-10-19

## 2022-10-18 RX ORDER — ASPIRIN/CALCIUM CARB/MAGNESIUM 324 MG
81 TABLET ORAL DAILY
Refills: 0 | Status: DISCONTINUED | OUTPATIENT
Start: 2022-10-18 | End: 2022-10-27

## 2022-10-18 RX ORDER — HYDRALAZINE HCL 50 MG
50 TABLET ORAL
Refills: 0 | Status: DISCONTINUED | OUTPATIENT
Start: 2022-10-18 | End: 2022-10-27

## 2022-10-18 RX ORDER — ALBUTEROL 90 UG/1
2 AEROSOL, METERED ORAL EVERY 6 HOURS
Refills: 0 | Status: DISCONTINUED | OUTPATIENT
Start: 2022-10-18 | End: 2022-10-27

## 2022-10-18 RX ORDER — METOPROLOL TARTRATE 50 MG
100 TABLET ORAL DAILY
Refills: 0 | Status: DISCONTINUED | OUTPATIENT
Start: 2022-10-18 | End: 2022-10-27

## 2022-10-18 RX ORDER — AZTREONAM 2 G
2000 VIAL (EA) INJECTION EVERY 8 HOURS
Refills: 0 | Status: DISCONTINUED | OUTPATIENT
Start: 2022-10-18 | End: 2022-10-20

## 2022-10-18 RX ORDER — HEPARIN SODIUM 5000 [USP'U]/ML
INJECTION INTRAVENOUS; SUBCUTANEOUS
Qty: 25000 | Refills: 0 | Status: DISCONTINUED | OUTPATIENT
Start: 2022-10-18 | End: 2022-10-19

## 2022-10-18 RX ADMIN — Medication 100 MILLIGRAM(S): at 19:22

## 2022-10-18 RX ADMIN — HEPARIN SODIUM 1200 UNIT(S)/HR: 5000 INJECTION INTRAVENOUS; SUBCUTANEOUS at 21:56

## 2022-10-18 RX ADMIN — MORPHINE SULFATE 60 MILLIGRAM(S): 50 CAPSULE, EXTENDED RELEASE ORAL at 23:48

## 2022-10-18 RX ADMIN — Medication 250 MILLIGRAM(S): at 20:17

## 2022-10-18 RX ADMIN — Medication 100 MILLIGRAM(S): at 21:56

## 2022-10-18 NOTE — H&P ADULT - HISTORY OF PRESENT ILLNESS
51 year old woman with PMH of SLE  lupus with lupus anticoagulant, antiphospholipid syndrome,  rheumatoid arthritis, cervical cancer sp hysterectomy,  laryngeal cancer sp CT, RT, CHF s/p AICD+PPM, DVT/PE,  NSTEMI in 2015, subclavian stenosis s/p stent placement, HTN, HLD, PPM/AICD, , and recent admission from 09/22-10/09 for proximal occlusion of left subclavian vein status post in-stent restenosis presenting to ER for evaluation.  Patient states was recently switched from Coumadin to Lovenox during Recent Admission and States Is Now Having Painful Diffuse Ecchymosis.  Patient Has Been Following up with hem/onc Dr. Sultana Who Recommended Admission for Bridging to Coumadin.  Patient Also Complaining of Hematuria for the past 6 Days.  No Associated Fever, Chills, Chest Pain, Shortness of Breath, Abdominal Pain, Flank Pain, Nausea, Vomiting    ED course: /67, HR 71, RR 20, T 97.8, SpO2 99% on RA. CBC and CMP were unremarkable. UA positive for LE, small amount of blood, epithelial cells, few bacteria. No pyuria. CT abdomen performed -No evidence of acute intra-abdominal pathology.     51 year old woman with PMH of SLE  lupus with lupus anticoagulant, antiphospholipid syndrome,  rheumatoid arthritis, cervical cancer sp hysterectomy,  laryngeal cancer sp CT, RT, CHF s/p AICD+PPM, DVT/PE,  NSTEMI in 2015, subclavian stenosis s/p stent placement, HTN, HLD, PPM/AICD, , and recent admission from 09/22-10/09 for proximal occlusion of left subclavian vein status post in-stent restenosis presenting to ER for evaluation.  Patient states was recently switched from Coumadin to Lovenox during Recent Admission and States Is Now Having Painful Diffuse Ecchymosis.  Patient Has Been Following up with hem/onc Dr. Sultana Who Recommended Admission for Bridging to Coumadin.  Patient Also Complaining of Hematuria for the past 6 Days and burning sensation when she urinates.  Furthermore reports painful swelling and redness in left lower extremity at a previous ecchymotic area.     ED course: /67, HR 71, RR 20, T 97.8, SpO2 99% on RA. CBC and CMP were unremarkable. UA positive for LE, small amount of blood, epithelial cells, few bacteria. No pyuria. CT abdomen performed -No evidence of acute intra-abdominal pathology.

## 2022-10-18 NOTE — ED PROVIDER NOTE - PHYSICAL EXAMINATION
CONSTITUTIONAL: Well-appearing; well-nourished; in no apparent distress.   EYES: PERRL; EOM intact.   ENT: normal nose; no rhinorrhea; normal pharynx with no tonsillar hypertrophy.   NECK: Supple; non-tender; no cervical lymphadenopathy.   CARDIOVASCULAR: Normal S1, S2; no murmurs, rubs, or gallops.   RESPIRATORY: Normal chest excursion with respiration; breath sounds clear and equal bilaterally; no wheezes, rhonchi, or rales.  GI/: Normal bowel sounds; non-distended; non-tender; no palpable organomegaly.   MS: No evidence of trauma or deformity.  Normal ROM in all four extremities; non-tender to palpation; distal pulses are normal.   SKIN: + diffuse areas of ecchymosis throughout extremities. + ttp to ecchymotic area  NEURO/PSYCH: A & O x 4; grossly unremarkable. mood and manner are appropriate. Grooming and personal hygiene are appropriate. No apparent thoughts of harm to self or others.

## 2022-10-18 NOTE — H&P ADULT - NSHPPHYSICALEXAM_GEN_ALL_CORE
LOS:     VITALS:   T(C): 36.6 (10-18-22 @ 15:35), Max: 36.6 (10-18-22 @ 15:35)  HR: 71 (10-18-22 @ 15:35) (71 - 71)  BP: 147/67 (10-18-22 @ 15:35) (147/67 - 147/67)  RR: 20 (10-18-22 @ 15:35) (20 - 20)  SpO2: 99% (10-18-22 @ 15:35) (99% - 99%)    GENERAL: NAD, lying in bed comfortably  HEAD:  Atraumatic, Normocephalic  EYES: EOMI, PERRLA, conjunctiva and sclera clear  ENT: Moist mucous membranes  NECK: Supple, No JVD  CHEST/LUNG: Clear to auscultation bilaterally; No rales, rhonchi, wheezing, or rubs. Unlabored respirations  HEART: Regular rate and rhythm; No murmurs, rubs, or gallops  ABDOMEN: BSx4; Soft, nontender, nondistended  EXTREMITIES:  2+ Peripheral Pulses, brisk capillary refill. No clubbing, cyanosis, or edema  NERVOUS SYSTEM:  A&Ox3, no focal deficits   SKIN: No rashes or lesions LOS:     VITALS:   T(C): 36.6 (10-18-22 @ 15:35), Max: 36.6 (10-18-22 @ 15:35)  HR: 71 (10-18-22 @ 15:35) (71 - 71)  BP: 147/67 (10-18-22 @ 15:35) (147/67 - 147/67)  RR: 20 (10-18-22 @ 15:35) (20 - 20)  SpO2: 99% (10-18-22 @ 15:35) (99% - 99%)    GENERAL: NAD, lying in bed comfortably  HEAD:  Atraumatic, Normocephalic  EYES: EOMI, PERRLA, conjunctiva and sclera clear  ENT: Moist mucous membranes  NECK: Supple, No JVD  CHEST/LUNG: Clear to auscultation bilaterally; no wheezing or crackles   HEART: Regular rate and rhythm; No murmurs, rubs, or gallops  ABDOMEN: BSx4; Soft, nontender, nondistended  EXTREMITIES:  2+ Peripheral Pulses, brisk capillary refill. No clubbing, cyanosis, or edema  NERVOUS SYSTEM:  A&Ox3, no focal deficits   SKIN: Multiple ecchymotic areas

## 2022-10-18 NOTE — ED ADULT TRIAGE NOTE - CHIEF COMPLAINT QUOTE
Patient was admitted to [facility] x 12 days, patient states she is having bruising and lumps from Lovenox due to a clot near her stent in her heart, patient sent here to switch medication to coumadin, +urinating blood x 6 days

## 2022-10-18 NOTE — ED PROVIDER NOTE - ATTENDING APP SHARED VISIT CONTRIBUTION OF CARE
51 y/o female with h/o lupus, antiphospholipid syndrome, RA, DVT/PE, prolonged QT s/p cardiac arrest s/p AICD/ppm, subclavian stenosis s/p stent, recent admission 9/22/22-10/9/22 for subclavian stent restenosis, changed from coumadin to lovenox during admission (was previously on xarelto), in ER with c/o hematuria.  Pt having blood tinged urine for the past 6 days.  no f/c.  no abd pain.  no n/v/d.  no blood in stool.  Pt also c/o diffuse ecchymoses since being switched to lovenox.  Pt following with heme/onc Dr. Terry who she states told her she may need to be bridged back to coumadin.  No cp/sob.  no ha/dizziness/loc.  PE - nad, nc/at, eomi,  perrl, op - clear, mmm, neck supple, cta b/l, no w/r/r, rrr, abd - soft, nt/nd, nabs, from x 4, + diffuse area of ecchymosis to extremities, LLE: shin with area of erythema/tenderness/warmth, no fluctuance or drainage, + swelling to LUE (per pt, is decreased from prior), A&O x 3, no focal motor/sensroy deficits.  -check labs, ua, ct abd

## 2022-10-18 NOTE — ED PROVIDER NOTE - CLINICAL SUMMARY MEDICAL DECISION MAKING FREE TEXT BOX
Labs reviewed: CBC/CMP unremarkable.  UA: Small blood, moderate leuks, few bacteria.  CT abdomen: No acute pathology.  Patient started on heparin drip, given IV antibiotics, admitted to medicine for continued care.

## 2022-10-18 NOTE — ED PROVIDER NOTE - CARE PLAN
1 Principal Discharge DX:	Cellulitis  Secondary Diagnosis:	Hematuria  Secondary Diagnosis:	Ecchymosis

## 2022-10-18 NOTE — ED PROVIDER NOTE - OBJECTIVE STATEMENT
52-year-old female with past medical history of laryngeal cancer 2010, prior cardiac arrest in the setting of prolonged QT status post AICD/ppm, SLE lupus with antiphospholipid syndrome, RA, cervical cancer status post hysterectomy CHF, DVT/PE on 2015, NSTEMI 2015, subclavian stenosis status post stent 04/21 with Dr. Ojeda, and recent admission from 09/22-10/09 for proximal occlusion of left subclavian vein status post in-stent restenosis presenting to ER for evaluation.  Patient states was recently switched from Coumadin to Lovenox during Recent Admission and States Is Now Having Painful Diffuse Ecchymosis.  Patient Has Been Following up with hem/onc Dr. Sultana Who Recommended Admission for Bridging to Coumadin.  Patient Also Complaining of Hematuria for the past 6 Days.  No Associated Fever, Chills, Chest Pain, Shortness of Breath, Abdominal Pain, Flank Pain, Nausea, Vomiting.

## 2022-10-18 NOTE — H&P ADULT - ATTENDING COMMENTS
50 YO F w/ a PMH of SLE with lupus anticoagulant, antiphospholipid syndrome, RA, cervical CA s/p hysterectomy, laryngeal CA s/p Chemo/radiation therapy, HFpEF s/p AICD+PPM, HTN, HLD, Hx of DVT/PE, and subclavian stenosis s/p stent placement w/ recent admission for proximal occlusion of left subclavian vein s/p in-stent restenosis who was sent into the hospital by her Heme/Onc (Dr. Sultana) for eval of multiple areas of bruising after being started on therapeutic Lovenox and to bridge the pt to Coumadin. Associated w/ pain and areas of redness. Denies any fevers/chills, SOB, CP, or LE swelling. ROS is negative except as above.     In the ED, CT-AP w/ IV contrast was negative for acute process. Pt was started on IV heparin and IV ABXs (Vanc/Aztreonam/Flagyl) in the ED.     FMHx: Reviewed, not relevant    Physical exam shows pt in NAD. VSS, afebrile, not hypoxic on RA. A&Ox3. Neuro exam without deficits, motor/sensory intact, no dysarthria, no facial asymmetry. Muscle strength/sensation intact. CTA B/L with no W/C/R. RRR, no M/G/R. ABD is soft and non-tender, normoactive BSs. LEs without swelling. Multiple areas of ecchymosis noted throughout body, there is an area on the LLE with surrounding erythema. Labs and radiology as above.     Diffuse ecchymosis, adverse effect of Lovenox, hx of SLE/APS and recent in-stent stenosis. Heme/Onc consult. Heparin drip. Serial Coags. Bridge to Coumadin. Repeat CBC in the AM.     Cellulitis of LLE; no sepsis present on admission. IV Vanc. Can switch to Bactrim PO on discharge.     HX of RA, cervical CA s/p hysterectomy, laryngeal CA s/p Chemo/radiation therapy, HFpEF s/p AICD+PPM, HTN, HLD, and Hx of DVT/PE. Restart home meds, except as stated above. DVT PPX. Inform PCP of pt's admission to hospital. My note supersedes the residents note.     Date seen by Attending: 10/18/22

## 2022-10-18 NOTE — H&P ADULT - ASSESSMENT
#History of VTE/ PE  #SLE/RA  #APS   - on heparin gtt, plan to start coumadin this admission       #History of long-QT syndrome and VT sp AICD  #HFpEF  - EF 63% 2019  - s/p AICD placement (s/p laser lead extraction of ICD leads and device and reimplanted with new RA/RV lead and new ICD device (Medtronic) in 2021)      #COPD  - inhalers and nebs for COPD   -counselled pt to avoid smoking and secondary exposure  - CXR w/out infiltrates (my read)    #Chronic pain syndrome  -I-Stop checked and meds ordered    #anxiety  -Xanax PRN   51 year old woman with PMH of SLE  lupus with lupus anticoagulant, antiphospholipid syndrome,  rheumatoid arthritis, cervical cancer sp hysterectomy,  laryngeal cancer sp CT, RT, CHF s/p AICD+PPM, DVT/PE,  NSTEMI in 2015, subclavian stenosis s/p stent placement, HTN, HLD, PPM/AICD, , and recent admission from 09/22-10/09 for proximal occlusion of left subclavian vein status post in-stent restenosis presenting to ER for evaluation.  Patient states was recently switched from Coumadin to Lovenox during Recent Admission and States Is Now Having Painful Diffuse Ecchymosis.  Patient Has Been Following up with hem/onc Dr. Sultana Who Recommended Admission for Bridging to Coumadin.  Patient Also Complaining of Hematuria for the past 6 Days and burning sensation when she urinates.  Furthermore reports painful swelling and redness in left lower extremity at a previous ecchymotic area.     ED course: /67, HR 71, RR 20, T 97.8, SpO2 99% on RA. CBC and CMP were unremarkable. UA positive for LE, small amount of blood, epithelial cells, few bacteria. No pyuria. CT abdomen performed -No evidence of acute intra-abdominal pathology.      #History of VTE/ PE  #SLE/RA  #APS   -multiple bruises while on Lovenox   - on heparin gtt, plan to start coumadin this admission   -keep active type and screen   -f/u Heme/onc eval     #Suspected infected Hematoma  #Cellulits   - left lower extremity, increase erythema and pain   - s/p post vanc, metronidazole, aztreonam in the ED   -c/w vanc for now       #Cystitis   -UA contaminated. No evidence of cystitis   -started on aztreonam in ED   -f/u urine cx       #History of long-QT syndrome and VT sp AICD  #HFpEF  - EF 63% 2019  - s/p AICD placement (s/p laser lead extraction of ICD leads and device and reimplanted with new RA/RV lead and new ICD device (Medtronic) in 2021)      #COPD  - inhalers and nebs for COPD   -counselled pt to avoid smoking and secondary exposure  - CXR w/out infiltrates (my read)    #Chronic pain syndrome  -MS contin 60 Q 6 hrs     #anxiety  -Xanax PRN    DVT ppx: Heparin gtt  Diet" DASH  Dispo: floor  code: full    51 year old woman with PMH of SLE  lupus with lupus anticoagulant, antiphospholipid syndrome,  rheumatoid arthritis, cervical cancer sp hysterectomy,  laryngeal cancer sp CT, RT, CHF s/p AICD+PPM, DVT/PE,  NSTEMI in 2015, subclavian stenosis s/p stent placement, HTN, HLD, PPM/AICD, , and recent admission from 09/22-10/09 for proximal occlusion of left subclavian vein status post in-stent restenosis presenting to ER for evaluation.  Patient states was recently switched from Coumadin to Lovenox during Recent Admission and States Is Now Having Painful Diffuse Ecchymosis.  Patient Has Been Following up with hem/onc Dr. Sultana Who Recommended Admission for Bridging to Coumadin.  Patient Also Complaining of Hematuria for the past 6 Days and burning sensation when she urinates.  Furthermore reports painful swelling and redness in left lower extremity at a previous ecchymotic area.     ED course: /67, HR 71, RR 20, T 97.8, SpO2 99% on RA. CBC and CMP were unremarkable. UA positive for LE, small amount of blood, epithelial cells, few bacteria. No pyuria. CT abdomen performed -No evidence of acute intra-abdominal pathology.      #History of VTE/ PE  #SLE/RA  #APS   -multiple bruises while on Lovenox   - on heparin gtt, plan to start coumadin this admission   -keep active type and screen   -f/u Heme/onc eval     #Suspected infected Hematoma  #Cellulits   - left lower extremity, increase erythema and pain   - s/p post vanc, metronidazole, aztreonam in the ED   -c/w vanc for now , given b-lactam allergy       #Cystitis   -UA contaminated. No evidence of cystitis   -started on aztreonam in ED   -f/u urine cx       #History of long-QT syndrome and VT sp AICD  #HFpEF  - EF 63% 2019  - s/p AICD placement (s/p laser lead extraction of ICD leads and device and reimplanted with new RA/RV lead and new ICD device (Medtronic) in 2021)      #COPD  - inhalers and nebs for COPD   -counselled pt to avoid smoking and secondary exposure  - CXR w/out infiltrates (my read)    #Chronic pain syndrome  -MS contin 60 Q 6 hrs     #anxiety  -Xanax PRN    DVT ppx: Heparin gtt  Diet" DASH  Dispo: floor  code: full    51 year old woman with PMH of SLE  lupus with lupus anticoagulant, antiphospholipid syndrome,  rheumatoid arthritis, cervical cancer sp hysterectomy,  laryngeal cancer sp CT, RT, CHF s/p AICD+PPM, DVT/PE,  NSTEMI in 2015, subclavian stenosis s/p stent placement, HTN, HLD, PPM/AICD, , and recent admission from 09/22-10/09 for proximal occlusion of left subclavian vein status post in-stent restenosis presenting to ER for evaluation.  Patient states was recently switched from Coumadin to Lovenox during Recent Admission and States Is Now Having Painful Diffuse Ecchymosis.  Patient Has Been Following up with hem/onc Dr. Sultana Who Recommended Admission for Bridging to Coumadin.  Patient Also Complaining of Hematuria for the past 6 Days and burning sensation when she urinates.  Furthermore reports painful swelling and redness in left lower extremity at a previous ecchymotic area.     ED course: /67, HR 71, RR 20, T 97.8, SpO2 99% on RA. CBC and CMP were unremarkable. UA positive for LE, small amount of blood, epithelial cells, few bacteria. No pyuria. CT abdomen performed -No evidence of acute intra-abdominal pathology.      #History of VTE/ PE  #SLE/RA  #APS   -multiple bruises while on Lovenox   - on heparin gtt, f/u ptts   -plan to start coumadin this admission   -keep active type and screen   -f/u Heme/onc eval     #Suspected infected Hematoma  #Cellulits   - left lower extremity, increase erythema and pain   - s/p post vanc, metronidazole, aztreonam in the ED   -c/w vanc for now , given b-lactam allergy       #Cystitis   -UA contaminated. No evidence of cystitis   -started on aztreonam in ED   -f/u urine cx       #History of long-QT syndrome and VT sp AICD  #HFpEF  - EF 63% 2019  - s/p AICD placement (s/p laser lead extraction of ICD leads and device and reimplanted with new RA/RV lead and new ICD device ([employer]tr[employer]) in 2021)      #COPD  - inhalers and nebs for COPD   -counselled pt to avoid smoking and secondary exposure  - CXR w/out infiltrates (my read)    #Chronic pain syndrome  -MS contin 60 Q 6 hrs     #anxiety  -Xanax PRN    DVT ppx: Heparin gtt  Diet" DASH  Dispo: floor  code: full

## 2022-10-18 NOTE — H&P ADULT - NSHPREVIEWOFSYSTEMS_GEN_ALL_CORE
REVIEW OF SYSTEMS:    CONSTITUTIONAL: No weakness, fevers or chills  EYES/ENT: No visual changes;  No vertigo or throat pain   NECK: No pain or stiffness  RESPIRATORY: No cough, wheezing, hemoptysis; No shortness of breath  CARDIOVASCULAR: No chest pain or palpitations  GASTROINTESTINAL: No abdominal or epigastric pain. No nausea, vomiting, or hematemesis; No diarrhea or constipation. No melena or hematochezia.  GENITOURINARY: No dysuria, frequency or hematuria  NEUROLOGICAL: No numbness or weakness  SKIN: No itching, rashes REVIEW OF SYSTEMS:    CONSTITUTIONAL: increase bruising   EYES/ENT: No visual changes;  No vertigo or throat pain   NECK: No pain or stiffness  RESPIRATORY: No cough, wheezing, hemoptysis; No shortness of breath  CARDIOVASCULAR: No chest pain or palpitations  GASTROINTESTINAL: No abdominal or epigastric pain. No nausea, vomiting, or hematemesis; No diarrhea or constipation. No melena or hematochezia.  GENITOURINARY: + hematuria, + dysuria   NEUROLOGICAL: No numbness or weakness  SKIN: No itching, rashes

## 2022-10-18 NOTE — ED ADULT NURSE NOTE - CHIEF COMPLAINT QUOTE
Patient was admitted to [facility] x 12 days, patient states she is having bruising and lumps from Lovenox due to a clot near her stent in her heart, patient sent here to switch medication to coumadin, +urinating blood x 6 days

## 2022-10-18 NOTE — ED PROVIDER NOTE - NS ED ROS FT
Constitutional: no fever, chills, no recent weight loss, change in appetite or malaise  Eyes: no redness/discharge/pain/vision changes  ENT: no rhinorrhea/ear pain/sore throat  Cardiac: No chest pain, SOB or edema.  Respiratory: No cough or respiratory distress  GI: No nausea, vomiting, diarrhea or abdominal pain.  : + painless hematuria  MS: no pain to back or extremities, no loss of ROM, no weakness  Neuro: No headache or weakness. No LOC.  Skin: + diffuse ecchymosis  Endocrine: No history of thyroid disease or diabetes.  Except as documented in the HPI, all other systems are negative. Constitutional: no fever, chills, no recent weight loss, change in appetite or malaise  Eyes: no redness/discharge/pain/vision changes  ENT: no rhinorrhea/ear pain/sore throat  Cardiac: No chest pain, SOB or edema.  Respiratory: No cough or respiratory distress  GI: No nausea, vomiting, diarrhea or abdominal pain.  : + painless hematuria  MS: no pain to back or extremities, no loss of ROM, no weakness  Neuro: No headache or weakness. No LOC.  Skin: + diffuse ecchymosis noted. + small area of erythema/warmth/ttp noted to L shin without any areas of fluctuance/drainage noted  Endocrine: No history of thyroid disease or diabetes.  Except as documented in the HPI, all other systems are negative.

## 2022-10-18 NOTE — H&P ADULT - NSHPLABSRESULTS_GEN_ALL_CORE
.  LABS:                         13.1   7.86  )-----------( 235      ( 18 Oct 2022 17:09 )             39.5     10-18    138  |  99  |  12  ----------------------------<  102<H>  4.1   |  31  |  1.0    Ca    9.5      18 Oct 2022 17:09    TPro  7.0  /  Alb  4.6  /  TBili  0.4  /  DBili  x   /  AST  34  /  ALT  39  /  AlkPhos  75  10-18    PT/INR - ( 18 Oct 2022 17:09 )   PT: 11.00 sec;   INR: 0.97 ratio         PTT - ( 18 Oct 2022 17:09 )  PTT:65.8 sec  Urinalysis Basic - ( 18 Oct 2022 17:14 )    Color: Yellow / Appearance: Clear / S.029 / pH: x  Gluc: x / Ketone: Trace  / Bili: Negative / Urobili: 6 mg/dL   Blood: x / Protein: 30 mg/dL / Nitrite: Negative   Leuk Esterase: Moderate / RBC: 12 /HPF / WBC 4 /HPF   Sq Epi: x / Non Sq Epi: 14 /HPF / Bacteria: Few            RADIOLOGY, EKG & ADDITIONAL TESTS: Reviewed.

## 2022-10-19 LAB
ANION GAP SERPL CALC-SCNC: 11 MMOL/L — SIGNIFICANT CHANGE UP (ref 7–14)
APTT BLD: 49 SEC — HIGH (ref 27–39.2)
APTT BLD: >200 SEC — CRITICAL HIGH (ref 27–39.2)
BASOPHILS # BLD AUTO: 0.03 K/UL — SIGNIFICANT CHANGE UP (ref 0–0.2)
BASOPHILS NFR BLD AUTO: 0.4 % — SIGNIFICANT CHANGE UP (ref 0–1)
BLD GP AB SCN SERPL QL: SIGNIFICANT CHANGE UP
BUN SERPL-MCNC: 11 MG/DL — SIGNIFICANT CHANGE UP (ref 10–20)
CALCIUM SERPL-MCNC: 9.2 MG/DL — SIGNIFICANT CHANGE UP (ref 8.4–10.5)
CHLORIDE SERPL-SCNC: 101 MMOL/L — SIGNIFICANT CHANGE UP (ref 98–110)
CO2 SERPL-SCNC: 27 MMOL/L — SIGNIFICANT CHANGE UP (ref 17–32)
CREAT SERPL-MCNC: 0.8 MG/DL — SIGNIFICANT CHANGE UP (ref 0.7–1.5)
EGFR: 89 ML/MIN/1.73M2 — SIGNIFICANT CHANGE UP
EOSINOPHIL # BLD AUTO: 0.21 K/UL — SIGNIFICANT CHANGE UP (ref 0–0.7)
EOSINOPHIL NFR BLD AUTO: 2.9 % — SIGNIFICANT CHANGE UP (ref 0–8)
GLUCOSE SERPL-MCNC: 104 MG/DL — HIGH (ref 70–99)
HCT VFR BLD CALC: 33.2 % — LOW (ref 37–47)
HCT VFR BLD CALC: 37.8 % — SIGNIFICANT CHANGE UP (ref 37–47)
HGB BLD-MCNC: 11.1 G/DL — LOW (ref 12–16)
HGB BLD-MCNC: 12.6 G/DL — SIGNIFICANT CHANGE UP (ref 12–16)
IMM GRANULOCYTES NFR BLD AUTO: 0.4 % — HIGH (ref 0.1–0.3)
INR BLD: 0.93 RATIO — SIGNIFICANT CHANGE UP (ref 0.65–1.3)
LYMPHOCYTES # BLD AUTO: 2.37 K/UL — SIGNIFICANT CHANGE UP (ref 1.2–3.4)
LYMPHOCYTES # BLD AUTO: 32.6 % — SIGNIFICANT CHANGE UP (ref 20.5–51.1)
MAGNESIUM SERPL-MCNC: 1.6 MG/DL — LOW (ref 1.8–2.4)
MCHC RBC-ENTMCNC: 29.7 PG — SIGNIFICANT CHANGE UP (ref 27–31)
MCHC RBC-ENTMCNC: 29.8 PG — SIGNIFICANT CHANGE UP (ref 27–31)
MCHC RBC-ENTMCNC: 33.3 G/DL — SIGNIFICANT CHANGE UP (ref 32–37)
MCHC RBC-ENTMCNC: 33.4 G/DL — SIGNIFICANT CHANGE UP (ref 32–37)
MCV RBC AUTO: 89 FL — SIGNIFICANT CHANGE UP (ref 81–99)
MCV RBC AUTO: 89.2 FL — SIGNIFICANT CHANGE UP (ref 81–99)
MONOCYTES # BLD AUTO: 0.61 K/UL — HIGH (ref 0.1–0.6)
MONOCYTES NFR BLD AUTO: 8.4 % — SIGNIFICANT CHANGE UP (ref 1.7–9.3)
NEUTROPHILS # BLD AUTO: 4.01 K/UL — SIGNIFICANT CHANGE UP (ref 1.4–6.5)
NEUTROPHILS NFR BLD AUTO: 55.3 % — SIGNIFICANT CHANGE UP (ref 42.2–75.2)
NRBC # BLD: 0 /100 WBCS — SIGNIFICANT CHANGE UP (ref 0–0)
NRBC # BLD: 0 /100 WBCS — SIGNIFICANT CHANGE UP (ref 0–0)
PLATELET # BLD AUTO: 150 K/UL — SIGNIFICANT CHANGE UP (ref 130–400)
PLATELET # BLD AUTO: 171 K/UL — SIGNIFICANT CHANGE UP (ref 130–400)
POTASSIUM SERPL-MCNC: 3.5 MMOL/L — SIGNIFICANT CHANGE UP (ref 3.5–5)
POTASSIUM SERPL-SCNC: 3.5 MMOL/L — SIGNIFICANT CHANGE UP (ref 3.5–5)
PROTHROM AB SERPL-ACNC: 10.6 SEC — SIGNIFICANT CHANGE UP (ref 9.95–12.87)
RBC # BLD: 3.73 M/UL — LOW (ref 4.2–5.4)
RBC # BLD: 4.24 M/UL — SIGNIFICANT CHANGE UP (ref 4.2–5.4)
RBC # FLD: 13.3 % — SIGNIFICANT CHANGE UP (ref 11.5–14.5)
RBC # FLD: 13.3 % — SIGNIFICANT CHANGE UP (ref 11.5–14.5)
SARS-COV-2 RNA SPEC QL NAA+PROBE: SIGNIFICANT CHANGE UP
SODIUM SERPL-SCNC: 139 MMOL/L — SIGNIFICANT CHANGE UP (ref 135–146)
WBC # BLD: 7.26 K/UL — SIGNIFICANT CHANGE UP (ref 4.8–10.8)
WBC # BLD: 7.45 K/UL — SIGNIFICANT CHANGE UP (ref 4.8–10.8)
WBC # FLD AUTO: 7.26 K/UL — SIGNIFICANT CHANGE UP (ref 4.8–10.8)
WBC # FLD AUTO: 7.45 K/UL — SIGNIFICANT CHANGE UP (ref 4.8–10.8)

## 2022-10-19 PROCEDURE — 99222 1ST HOSP IP/OBS MODERATE 55: CPT

## 2022-10-19 PROCEDURE — 99233 SBSQ HOSP IP/OBS HIGH 50: CPT

## 2022-10-19 RX ORDER — WARFARIN SODIUM 2.5 MG/1
5 TABLET ORAL ONCE
Refills: 0 | Status: COMPLETED | OUTPATIENT
Start: 2022-10-19 | End: 2022-10-19

## 2022-10-19 RX ORDER — HEPARIN SODIUM 5000 [USP'U]/ML
2500 INJECTION INTRAVENOUS; SUBCUTANEOUS EVERY 6 HOURS
Refills: 0 | Status: DISCONTINUED | OUTPATIENT
Start: 2022-10-19 | End: 2022-10-26

## 2022-10-19 RX ORDER — HEPARIN SODIUM 5000 [USP'U]/ML
5500 INJECTION INTRAVENOUS; SUBCUTANEOUS EVERY 6 HOURS
Refills: 0 | Status: DISCONTINUED | OUTPATIENT
Start: 2022-10-19 | End: 2022-10-26

## 2022-10-19 RX ORDER — MORPHINE SULFATE 50 MG/1
120 CAPSULE, EXTENDED RELEASE ORAL THREE TIMES A DAY
Refills: 0 | Status: DISCONTINUED | OUTPATIENT
Start: 2022-10-19 | End: 2022-10-26

## 2022-10-19 RX ORDER — HEPARIN SODIUM 5000 [USP'U]/ML
1200 INJECTION INTRAVENOUS; SUBCUTANEOUS
Qty: 25000 | Refills: 0 | Status: DISCONTINUED | OUTPATIENT
Start: 2022-10-19 | End: 2022-10-26

## 2022-10-19 RX ORDER — SENNA PLUS 8.6 MG/1
2 TABLET ORAL AT BEDTIME
Refills: 0 | Status: DISCONTINUED | OUTPATIENT
Start: 2022-10-19 | End: 2022-10-27

## 2022-10-19 RX ORDER — POLYETHYLENE GLYCOL 3350 17 G/17G
17 POWDER, FOR SOLUTION ORAL DAILY
Refills: 0 | Status: DISCONTINUED | OUTPATIENT
Start: 2022-10-19 | End: 2022-10-27

## 2022-10-19 RX ORDER — MAGNESIUM SULFATE 500 MG/ML
1 VIAL (ML) INJECTION ONCE
Refills: 0 | Status: COMPLETED | OUTPATIENT
Start: 2022-10-19 | End: 2022-10-19

## 2022-10-19 RX ADMIN — BUDESONIDE AND FORMOTEROL FUMARATE DIHYDRATE 2 PUFF(S): 160; 4.5 AEROSOL RESPIRATORY (INHALATION) at 21:21

## 2022-10-19 RX ADMIN — Medication 100 MILLIGRAM(S): at 06:12

## 2022-10-19 RX ADMIN — Medication 100 GRAM(S): at 11:35

## 2022-10-19 RX ADMIN — Medication 81 MILLIGRAM(S): at 11:35

## 2022-10-19 RX ADMIN — HEPARIN SODIUM 1000 UNIT(S)/HR: 5000 INJECTION INTRAVENOUS; SUBCUTANEOUS at 06:10

## 2022-10-19 RX ADMIN — Medication 100 MILLIGRAM(S): at 14:15

## 2022-10-19 RX ADMIN — Medication 2 MILLIGRAM(S): at 22:05

## 2022-10-19 RX ADMIN — Medication 100 MILLIGRAM(S): at 21:35

## 2022-10-19 RX ADMIN — HEPARIN SODIUM 0 UNIT(S)/HR: 5000 INJECTION INTRAVENOUS; SUBCUTANEOUS at 04:56

## 2022-10-19 RX ADMIN — Medication 100 MILLIGRAM(S): at 06:06

## 2022-10-19 RX ADMIN — WARFARIN SODIUM 5 MILLIGRAM(S): 2.5 TABLET ORAL at 21:23

## 2022-10-19 RX ADMIN — POLYETHYLENE GLYCOL 3350 17 GRAM(S): 17 POWDER, FOR SOLUTION ORAL at 11:36

## 2022-10-19 RX ADMIN — MORPHINE SULFATE 60 MILLIGRAM(S): 50 CAPSULE, EXTENDED RELEASE ORAL at 00:22

## 2022-10-19 RX ADMIN — Medication 250 MILLIGRAM(S): at 17:46

## 2022-10-19 RX ADMIN — Medication 250 MILLIGRAM(S): at 06:05

## 2022-10-19 RX ADMIN — Medication 2 MILLIGRAM(S): at 06:06

## 2022-10-19 RX ADMIN — HEPARIN SODIUM 2500 UNIT(S): 5000 INJECTION INTRAVENOUS; SUBCUTANEOUS at 14:19

## 2022-10-19 RX ADMIN — Medication 50 MILLIGRAM(S): at 17:30

## 2022-10-19 RX ADMIN — MORPHINE SULFATE 120 MILLIGRAM(S): 50 CAPSULE, EXTENDED RELEASE ORAL at 14:02

## 2022-10-19 RX ADMIN — Medication 50 MILLIGRAM(S): at 06:06

## 2022-10-19 RX ADMIN — HEPARIN SODIUM 1100 UNIT(S)/HR: 5000 INJECTION INTRAVENOUS; SUBCUTANEOUS at 14:06

## 2022-10-19 RX ADMIN — MORPHINE SULFATE 120 MILLIGRAM(S): 50 CAPSULE, EXTENDED RELEASE ORAL at 21:22

## 2022-10-19 RX ADMIN — MORPHINE SULFATE 120 MILLIGRAM(S): 50 CAPSULE, EXTENDED RELEASE ORAL at 06:06

## 2022-10-19 NOTE — PROGRESS NOTE ADULT - ASSESSMENT
51 year old woman with PMH of SLE  lupus with lupus anticoagulant, antiphospholipid syndrome,  rheumatoid arthritis, cervical cancer sp hysterectomy,  laryngeal cancer sp CT, RT, CHF s/p AICD+PPM, DVT/PE,  NSTEMI in 2015, subclavian stenosis s/p stent placement, HTN, HLD, PPM/AICD, , and recent admission from 09/22-10/09 for proximal occlusion of left subclavian vein status post in-stent restenosis presenting to ER for evaluation.  Patient states was recently switched from Coumadin to Lovenox during Recent Admission and States Is Now Having Painful Diffuse Ecchymosis.  Patient Has Been Following up with hem/onc Dr. Sultana Who Recommended Admission for Bridging to Coumadin.  Patient Also Complaining of Hematuria for the past 6 Days and burning sensation when she urinates.  Furthermore reports painful swelling and redness in left lower extremity at a previous ecchymotic area.     ED course: /67, HR 71, RR 20, T 97.8, SpO2 99% on RA. CBC and CMP were unremarkable. UA positive for LE, small amount of blood, epithelial cells, few bacteria. No pyuria. CT abdomen performed -No evidence of acute intra-abdominal pathology.      #History of VTE/ PE  #SLE/RA  #APS   -multiple bruises while on Lovenox   - on heparin gtt, f/u ptts   -plan to start coumadin this admission   -keep active type and screen   -f/u Heme/onc eval   - , held this am, recheck PTT prior to starting  coumadin  - monitor hgb    #suspected mag deff- repleted    #Suspected infected Hematoma  #Cellulits   - left lower extremity, increase erythema and pain   - s/p post vanc, metronidazole, aztreonam in the ED   -c/w vanc for now , given b-lactam allergy   - ID consult  - CT abd pelvic- neg RP bleed       #Cystitis   -UA contaminated. No evidence of cystitis   -started on aztreonam in ED   -f/u urine cx   - ID consult       #History of long-QT syndrome and VT sp AICD  #HFpEF  - EF 63% 2019  - s/p AICD placement (s/p laser lead extraction of ICD leads and device and reimplanted with new RA/RV lead and new ICD device ([employer]) in 2021)      #COPD  - inhalers and nebs for COPD   -counselled pt to avoid smoking and secondary exposure  - CXR w/out infiltrates (my read)    #Chronic pain syndrome  -MS contin 60 Q 6 hrs     #anxiety  -Xanax PRN    #Progress Note Handoff  Pending (specify):  follow up PTTs, hemeonc,   Family discussion: house staff updated pt family  Disposition:  home  Anticipated date: 3-5 days

## 2022-10-19 NOTE — CONSULT NOTE ADULT - ASSESSMENT
51 year old woman with PMH of SLE  lupus with lupus anticoagulant, antiphospholipid syndrome,  rheumatoid arthritis, cervical cancer sp hysterectomy,  laryngeal cancer sp CT, RT, CHF s/p AICD+PPM, DVT/PE,  NSTEMI in 2015, subclavian stenosis s/p stent placement, HTN, HLD, PPM/AICD, , and recent admission from 09/22-10/09 for proximal occlusion of left subclavian vein status post in-stent restenosis presenting to ER for evaluation.     # Anti-phospholipid syndrome  # Extensive history of DVTs, recent in stent (subclavian vein) thrombosis  # Lower extremity lesion secondary to cellulitis?    - in past she could not maintain therapeutic INR on warfarin, she clotted on DOAC, she developed subcutaneous lumps and bruising on Lovenox. The subcutanous painfule nodules after fondaparinux were biopsied and they showed ulcerated, hemorrhagic and inflamed subepidermal blister, purpura and perivascular infiltrate of lymphocytes , findings can be seen in a traumatized angiokeratoma or hemangioma.  - recently admitted in [facility] and diagnosed with in setent thrombosis of subclavian vein and was switched to lovenox from coumadin. She has again developed diffuse echymosis at injection sites and tender nodules on her legs.    # cervical cancer S/P hysterectomy, laryngeal cancer S/P CT and RT  - in remission    Recommendations:  51 year old woman with PMH of SLE  lupus with lupus anticoagulant, antiphospholipid syndrome,  rheumatoid arthritis, cervical cancer sp hysterectomy,  laryngeal cancer sp CT, RT, CHF s/p AICD+PPM, DVT/PE,  NSTEMI in 2015, subclavian stenosis s/p stent placement, HTN, HLD, PPM/AICD, , and recent admission from 09/22-10/09 for proximal occlusion of left subclavian vein status post in-stent restenosis presenting to ER for evaluation.     # Anti-phospholipid syndrome  # Extensive history of DVTs, recent in stent (subclavian vein) thrombosis  # Lower extremity lesion secondary to cellulitis?    - in past she could not maintain therapeutic INR on warfarin, she clotted on DOAC, she developed subcutaneous lumps and bruising on Lovenox. The subcutanous painfule nodules after fondaparinux were biopsied and they showed ulcerated, hemorrhagic and inflamed subepidermal blister, purpura and perivascular infiltrate of lymphocytes , findings can be seen in a traumatized angiokeratoma or hemangioma.  - recently admitted in [facility] and diagnosed with in setent thrombosis of subclavian vein and was switched to lovenox from coumadin. She has again developed diffuse echymosis at injection sites and tender nodules on her legs.    # cervical cancer S/P hysterectomy, laryngeal cancer S/P CT and RT  - in remission    Recommendations:     - Patient hs history of VTE on Coumadin, given her inconvenience if we switch her to Coumadin the goal INR would be higher this time ( INR 3-4), which comes at risk for bleeding which i discussed with the patient  - For now c/w heparin gtt (adjust with PTT 40-70)  - the painful nodules on her legs are old and she has long history of them, they have improved with antibiotics in the past.     For any questions call [phone] or text via MS teams    This is an incomplete note 51 year old woman with PMH of SLE  lupus with lupus anticoagulant, antiphospholipid syndrome,  rheumatoid arthritis, cervical cancer sp hysterectomy,  laryngeal cancer sp CT, RT, CHF s/p AICD+PPM, DVT/PE,  NSTEMI in 2015, subclavian stenosis s/p stent placement, HTN, HLD, PPM/AICD, , and recent admission from 09/22-10/09 for proximal occlusion of left subclavian vein status post in-stent restenosis presenting to ER for evaluation.     # Anti-phospholipid syndrome  # Extensive history of DVTs, recent in stent (subclavian vein) thrombosis  # Lower extremity lesion secondary to cellulitis?    - in past she could not maintain therapeutic INR on warfarin, she clotted on DOAC, she developed subcutaneous lumps and bruising on Lovenox. The subcutanous painfule nodules after fondaparinux were biopsied and they showed ulcerated, hemorrhagic and inflamed subepidermal blister, purpura and perivascular infiltrate of lymphocytes , findings can be seen in a traumatized angiokeratoma or hemangioma.  - recently admitted in [facility] and diagnosed with in setent thrombosis of subclavian vein and was switched to lovenox from coumadin. She has again developed diffuse echymosis at injection sites and tender nodules on her legs.    # cervical cancer S/P hysterectomy, laryngeal cancer S/P CT and RT  - in remission    Recommendations:     - Patient has history of VTE on Coumadin, given her inconvenience if we switch her to Coumadin the goal INR would be higher this time ( INR 3-4), which comes at risk for bleeding which i discussed with the patient  - For now c/w heparin gtt (adjust with PTT 40-70) and start coumadin 5 mg daily with the goal INR 3-4  - the painful nodules on her legs are old and she has long history of them, they have improved with antibiotics in the past. As per biopsy differentials include  traumatized angiokeratoma or hemangioma.    For any questions call [phone] or text via MS teams     51 year old woman with PMH of SLE with lupus anticoagulant, antiphospholipid syndrome,  rheumatoid arthritis, cervical cancer sp hysterectomy,  laryngeal cancer sp CT, RT, CHF s/p AICD+PPM, DVT/PE,  NSTEMI in 2015, subclavian stenosis s/p stent placement, HTN, HLD, PPM/AICD, , and recent admission from 09/22-10/09 for proximal occlusion of left subclavian vein status post in-stent restenosis presenting to ER for evaluation.     # Anti-phospholipid syndrome  # Extensive history of DVTs, recent in stent (subclavian vein) thrombosis  # Lower extremity lesion secondary to cellulitis?    - In past she could not maintain therapeutic INR on warfarin, she clotted on DOAC, she developed subcutaneous lumps and bruising on Lovenox. The subcutanous painfule nodules after fondaparinux were biopsied and they showed ulcerated, hemorrhagic and inflamed subepidermal blister, purpura and perivascular infiltrate of lymphocytes , findings can be seen in a traumatized angiokeratoma or hemangioma and have been reported with fondaparinux.  - Recently admitted in [facility] and diagnosed with in stent thrombosis of subclavian vein and was switched to Lovenox again from Coumadin. She has again developed diffuse ecchymosis at injection sites and tender nodules on her legs.    # Cervical cancer S/P hysterectomy, laryngeal cancer S/P CT and RT  - In remission    Recommendations:     - Patient has history of VTE on Coumadin at an INR of 2-3. If we switch her to Coumadin the goal INR would be higher this time ( INR 3-4), which comes at risk for bleeding which we discussed with the patient  - For now c/w heparin gtt (adjust with PTT 40-70) and start Coumadin 5 mg daily with the goal INR 3-4 as recommended by the anticoagulation team at [place].  - The painful nodules on her legs are old and she has long history of them and some of them have improved with antibiotics in the past. As per biopsy differentials include  traumatized angiokeratoma or hemangioma but that was only at the sites of fondaparinux injections.  - Monitor platelets.    For any questions call [phone] or text via MS teams

## 2022-10-19 NOTE — CONSULT NOTE ADULT - ATTENDING COMMENTS
The patient was also seen and examined by myself. I agree with Dr. Dawn's note above. Situation was discussed with her and the patient. All questions answered.  This has been a complicated case and the patient has a follow up appointment soon with Dr. Vasquez (coagulation specialist who did not see her yet even in the hospital) at [facility].

## 2022-10-19 NOTE — CONSULT NOTE ADULT - SUBJECTIVE AND OBJECTIVE BOX
Hematology Consult Note    HPI:  51 year old woman with PMH of SLE  lupus with lupus anticoagulant, antiphospholipid syndrome,  rheumatoid arthritis, cervical cancer sp hysterectomy,  laryngeal cancer sp CT, RT, CHF s/p AICD+PPM, DVT/PE,  NSTEMI in 2015, subclavian stenosis s/p stent placement, HTN, HLD, PPM/AICD, , and recent admission from 09/22-10/09 for proximal occlusion of left subclavian vein status post in-stent restenosis presenting to ER for evaluation.  Patient states was recently switched from Coumadin to Lovenox during Recent Admission and States Is Now Having Painful Diffuse Ecchymosis.  Patient Has Been Following up with hem/onc Dr. Sultana Who Recommended Admission for Bridging to Coumadin.  Patient Also Complaining of Hematuria for the past 6 Days and burning sensation when she urinates.  Furthermore reports painful swelling and redness in left lower extremity at a previous ecchymotic area.     ED course: /67, HR 71, RR 20, T 97.8, SpO2 99% on RA. CBC and CMP were unremarkable. UA positive for LE, small amount of blood, epithelial cells, few bacteria. No pyuria. CT abdomen performed -No evidence of acute intra-abdominal pathology.     (18 Oct 2022 22:06)      Allergies    adhesives (Rash; Urticaria)  Arava (Anaphylaxis; Angioedema)  Avelox (Other)  Plaquenil (Other)  Vantin (Other (Mild))    Intolerances        MEDICATIONS  (STANDING):  aspirin enteric coated 81 milliGRAM(s) Oral daily  aztreonam  IVPB 2000 milliGRAM(s) IV Intermittent every 8 hours  budesonide  80 MICROgram(s)/formoterol 4.5 MICROgram(s) Inhaler 2 Puff(s) Inhalation two times a day  heparin  Infusion. 1200 Unit(s)/Hr (12 mL/Hr) IV Continuous <Continuous>  hydrALAZINE 50 milliGRAM(s) Oral two times a day  magnesium sulfate  IVPB 1 Gram(s) IV Intermittent once  metoprolol succinate  milliGRAM(s) Oral daily  morphine ER Tablet 120 milliGRAM(s) Oral three times a day  polyethylene glycol 3350 17 Gram(s) Oral daily  senna 2 Tablet(s) Oral at bedtime  vancomycin  IVPB 750 milliGRAM(s) IV Intermittent every 12 hours    MEDICATIONS  (PRN):  ALBUTerol    90 MICROgram(s) HFA Inhaler 2 Puff(s) Inhalation every 6 hours PRN Shortness of Breath and/or Wheezing  ALPRAZolam 2 milliGRAM(s) Oral three times a day PRN anxiety  heparin   Injectable 5500 Unit(s) IV Push every 6 hours PRN For aPTT less than 40  heparin   Injectable 2500 Unit(s) IV Push every 6 hours PRN For aPTT between 40 - 57      PAST MEDICAL & SURGICAL HISTORY:  Lupus      RA (rheumatoid arthritis)      HTN (hypertension)      CHF (congestive heart failure)      COPD (chronic obstructive pulmonary disease)      DVT (deep venous thrombosis)      Pulmonary embolism      History of laryngeal cancer      GERD (gastroesophageal reflux disease)      Cervical cancer      S/P appendectomy      S/P cholecystectomy      AICD (automatic cardioverter/defibrillator) present      S/P hysterectomy      History of back surgery      H/O foot surgery      S/P eye surgery      Subclavian arterial stenosis          FAMILY HISTORY:  Family history of cervical cancer    FH: thyroid cancer        SOCIAL HISTORY: No EtOH, no tobacco    REVIEW OF SYSTEMS:    CONSTITUTIONAL: No weakness, fevers or chills  EYES/ENT: No visual changes;  No vertigo or throat pain   NECK: No pain or stiffness  RESPIRATORY: No cough, wheezing, hemoptysis; No shortness of breath  CARDIOVASCULAR: No chest pain or palpitations  GASTROINTESTINAL: No abdominal or epigastric pain. No nausea, vomiting, or hematemesis; No diarrhea or constipation. No melena or hematochezia.  GENITOURINARY: No dysuria, frequency or hematuria  NEUROLOGICAL: No numbness or weakness  SKIN: No itching, burning, rashes, or lesions   All other review of systems is negative unless indicated above.    Height (cm): 160 (10-18 @ 15:35)  Weight (kg): 68.9 (10-18 @ 15:35)  BMI (kg/m2): 26.9 (10-18 @ 15:35)  BSA (m2): 1.72 (10-18 @ 15:35)    T(F): 98.7 (10-19-22 @ 08:01), Max: 98.7 (10-19-22 @ 08:01)  HR: 67 (10-19-22 @ 08:01)  BP: 130/69 (10-19-22 @ 08:01)  RR: 18 (10-19-22 @ 08:01)  SpO2: 97% (10-19-22 @ 08:01)  Wt(kg): --    GENERAL: NAD, well-developed  HEAD:  Atraumatic, Normocephalic  EYES: EOMI, PERRLA, conjunctiva and sclera clear  NECK: Supple, No JVD  CHEST/LUNG: Clear to auscultation bilaterally; No wheeze  HEART: Regular rate and rhythm; No murmurs, rubs, or gallops  ABDOMEN: Soft, Nontender, Nondistended; Bowel sounds present  EXTREMITIES:  2+ Peripheral Pulses, No clubbing, cyanosis, or edema  NEUROLOGY: non-focal  SKIN: multiple bruises on abdomen and les bilaterally                          11.1   7.26  )-----------( 171      ( 19 Oct 2022 05:05 )             33.2       10-19    139  |  101  |  11  ----------------------------<  104<H>  3.5   |  27  |  0.8    Ca    9.2      19 Oct 2022 05:05  Mg     1.6     10-19    TPro  7.0  /  Alb  4.6  /  TBili  0.4  /  DBili  x   /  AST  34  /  ALT  39  /  AlkPhos  75  10-18      Magnesium, Serum: 1.6 mg/dL (10-19 @ 05:05)       Hematology Consult Note    HPI:  51 year old woman with PMH of SLE with lupus anticoagulant, antiphospholipid syndrome,  rheumatoid arthritis, cervical cancer sp hysterectomy,  laryngeal cancer sp CT, RT, CHF s/p AICD+PPM, DVT/PE,  NSTEMI in 2015, subclavian stenosis s/p stent placement, HTN, HLD, PPM/AICD, , and recent admission from 09/22-10/09 for proximal occlusion of left subclavian vein status post in-stent restenosis presenting to ER for evaluation.  Patient states she was recently switched from Coumadin to Lovenox during recent Admission and states Is now having painful diffuse ecchymosis. However, that is not entirely new; she has experienced similar situations in the past with different anticoagulants. Patient has been following up with hem/onc Dr. Sultana, as well as the coagulation group at [facility] who recommended admission for bridging to coumadin, with at this time pushing the INR to between 3 and 4. Patient also complaining of hematuria for the past 6 days and burning sensation when she urinates.  Furthermore reports painful swelling and redness in left lower extremity at a previous ecchymotic area.     ED course: /67, HR 71, RR 20, T 97.8, SpO2 99% on RA. CBC and CMP were unremarkable. UA positive for LE, small amount of blood, epithelial cells, few bacteria. No pyuria. CT abdomen performed -No evidence of acute intra-abdominal pathology.     (18 Oct 2022 22:06)      Allergies    adhesives (Rash; Urticaria)  Arava (Anaphylaxis; Angioedema)  Avelox (Other)  Plaquenil (Other)  Vantin (Other (Mild))    Intolerances        MEDICATIONS  (STANDING):  aspirin enteric coated 81 milliGRAM(s) Oral daily  aztreonam  IVPB 2000 milliGRAM(s) IV Intermittent every 8 hours  budesonide  80 MICROgram(s)/formoterol 4.5 MICROgram(s) Inhaler 2 Puff(s) Inhalation two times a day  heparin  Infusion. 1200 Unit(s)/Hr (12 mL/Hr) IV Continuous <Continuous>  hydrALAZINE 50 milliGRAM(s) Oral two times a day  magnesium sulfate  IVPB 1 Gram(s) IV Intermittent once  metoprolol succinate  milliGRAM(s) Oral daily  morphine ER Tablet 120 milliGRAM(s) Oral three times a day  polyethylene glycol 3350 17 Gram(s) Oral daily  senna 2 Tablet(s) Oral at bedtime  vancomycin  IVPB 750 milliGRAM(s) IV Intermittent every 12 hours    MEDICATIONS  (PRN):  ALBUTerol    90 MICROgram(s) HFA Inhaler 2 Puff(s) Inhalation every 6 hours PRN Shortness of Breath and/or Wheezing  ALPRAZolam 2 milliGRAM(s) Oral three times a day PRN anxiety  heparin   Injectable 5500 Unit(s) IV Push every 6 hours PRN For aPTT less than 40  heparin   Injectable 2500 Unit(s) IV Push every 6 hours PRN For aPTT between 40 - 57      PAST MEDICAL & SURGICAL HISTORY:  Lupus      RA (rheumatoid arthritis)      HTN (hypertension)      CHF (congestive heart failure)      COPD (chronic obstructive pulmonary disease)      DVT (deep venous thrombosis)      Pulmonary embolism      History of laryngeal cancer      GERD (gastroesophageal reflux disease)      Cervical cancer      S/P appendectomy      S/P cholecystectomy      AICD (automatic cardioverter/defibrillator) present      S/P hysterectomy      History of back surgery      H/O foot surgery      S/P eye surgery      Subclavian arterial stenosis          FAMILY HISTORY:  Family history of cervical cancer    FH: thyroid cancer        SOCIAL HISTORY: No EtOH, no tobacco    REVIEW OF SYSTEMS:    CONSTITUTIONAL: No weakness, fevers or chills  EYES/ENT: No visual changes;  No vertigo or throat pain   NECK: No pain or stiffness  RESPIRATORY: No cough, wheezing, hemoptysis; No shortness of breath  CARDIOVASCULAR: No chest pain or palpitations  GASTROINTESTINAL: No abdominal or epigastric pain. No nausea, vomiting, or hematemesis; No diarrhea or constipation. No melena or hematochezia.  GENITOURINARY: No dysuria, frequency or hematuria  NEUROLOGICAL: No numbness or weakness  SKIN: No itching, burning, rashes, or lesions   All other review of systems is negative unless indicated above.    Height (cm): 160 (10-18 @ 15:35)  Weight (kg): 68.9 (10-18 @ 15:35)  BMI (kg/m2): 26.9 (10-18 @ 15:35)  BSA (m2): 1.72 (10-18 @ 15:35)    T(F): 98.7 (10-19-22 @ 08:01), Max: 98.7 (10-19-22 @ 08:01)  HR: 67 (10-19-22 @ 08:01)  BP: 130/69 (10-19-22 @ 08:01)  RR: 18 (10-19-22 @ 08:01)  SpO2: 97% (10-19-22 @ 08:01)  Wt(kg): --    GENERAL: NAD, well-developed  HEAD:  Atraumatic, Normocephalic  EYES: EOMI, PERRLA, conjunctivae and sclerae clear  NECK: Supple, No JVD  CHEST/LUNG: Clear to auscultation bilaterally; No wheeze  HEART: Regular rate and rhythm; No murmurs, rubs, or gallops  ABDOMEN: Soft, Nontender, Nondistended; Bowel sounds present  EXTREMITIES:  2+ Peripheral Pulses, No clubbing, cyanosis, or edema  NEUROLOGY: non-focal  SKIN: multiple bruises on abdomen and legs bilaterally                          11.1   7.26  )-----------( 171      ( 19 Oct 2022 05:05 )             33.2       10-19    139  |  101  |  11  ----------------------------<  104<H>  3.5   |  27  |  0.8    Ca    9.2      19 Oct 2022 05:05  Mg     1.6     10-19    TPro  7.0  /  Alb  4.6  /  TBili  0.4  /  DBili  x   /  AST  34  /  ALT  39  /  AlkPhos  75  10-18      Magnesium, Serum: 1.6 mg/dL (10-19 @ 05:05)

## 2022-10-20 LAB
ALBUMIN SERPL ELPH-MCNC: 4.6 G/DL — SIGNIFICANT CHANGE UP (ref 3.5–5.2)
ALP SERPL-CCNC: 82 U/L — SIGNIFICANT CHANGE UP (ref 30–115)
ALT FLD-CCNC: 31 U/L — SIGNIFICANT CHANGE UP (ref 0–41)
ANION GAP SERPL CALC-SCNC: 15 MMOL/L — HIGH (ref 7–14)
APTT BLD: 124.1 SEC — CRITICAL HIGH (ref 27–39.2)
APTT BLD: 91.3 SEC — CRITICAL HIGH (ref 27–39.2)
APTT BLD: >200 SEC — CRITICAL HIGH (ref 27–39.2)
APTT BLD: >200 SEC — CRITICAL HIGH (ref 27–39.2)
AST SERPL-CCNC: 24 U/L — SIGNIFICANT CHANGE UP (ref 0–41)
BILIRUB SERPL-MCNC: 0.4 MG/DL — SIGNIFICANT CHANGE UP (ref 0.2–1.2)
BUN SERPL-MCNC: 12 MG/DL — SIGNIFICANT CHANGE UP (ref 10–20)
CALCIUM SERPL-MCNC: 9.6 MG/DL — SIGNIFICANT CHANGE UP (ref 8.4–10.4)
CHLORIDE SERPL-SCNC: 101 MMOL/L — SIGNIFICANT CHANGE UP (ref 98–110)
CO2 SERPL-SCNC: 24 MMOL/L — SIGNIFICANT CHANGE UP (ref 17–32)
CREAT SERPL-MCNC: 0.8 MG/DL — SIGNIFICANT CHANGE UP (ref 0.7–1.5)
EGFR: 89 ML/MIN/1.73M2 — SIGNIFICANT CHANGE UP
GLUCOSE SERPL-MCNC: 127 MG/DL — HIGH (ref 70–99)
HCT VFR BLD CALC: 38.8 % — SIGNIFICANT CHANGE UP (ref 37–47)
HGB BLD-MCNC: 13 G/DL — SIGNIFICANT CHANGE UP (ref 12–16)
INR BLD: 0.94 RATIO — SIGNIFICANT CHANGE UP (ref 0.65–1.3)
INR BLD: 0.97 RATIO — SIGNIFICANT CHANGE UP (ref 0.65–1.3)
MAGNESIUM SERPL-MCNC: 1.6 MG/DL — LOW (ref 1.8–2.4)
MCHC RBC-ENTMCNC: 29.5 PG — SIGNIFICANT CHANGE UP (ref 27–31)
MCHC RBC-ENTMCNC: 33.5 G/DL — SIGNIFICANT CHANGE UP (ref 32–37)
MCV RBC AUTO: 88 FL — SIGNIFICANT CHANGE UP (ref 81–99)
NRBC # BLD: 0 /100 WBCS — SIGNIFICANT CHANGE UP (ref 0–0)
PLATELET # BLD AUTO: 206 K/UL — SIGNIFICANT CHANGE UP (ref 130–400)
POTASSIUM SERPL-MCNC: 3.9 MMOL/L — SIGNIFICANT CHANGE UP (ref 3.5–5)
POTASSIUM SERPL-SCNC: 3.9 MMOL/L — SIGNIFICANT CHANGE UP (ref 3.5–5)
PROT SERPL-MCNC: 7.1 G/DL — SIGNIFICANT CHANGE UP (ref 6–8)
PROTHROM AB SERPL-ACNC: 10.7 SEC — SIGNIFICANT CHANGE UP (ref 9.95–12.87)
PROTHROM AB SERPL-ACNC: 11.1 SEC — SIGNIFICANT CHANGE UP (ref 9.95–12.87)
RBC # BLD: 4.41 M/UL — SIGNIFICANT CHANGE UP (ref 4.2–5.4)
RBC # FLD: 13.4 % — SIGNIFICANT CHANGE UP (ref 11.5–14.5)
SODIUM SERPL-SCNC: 140 MMOL/L — SIGNIFICANT CHANGE UP (ref 135–146)
VANCOMYCIN TROUGH SERPL-MCNC: 8.7 UG/ML — SIGNIFICANT CHANGE UP (ref 5–10)
WBC # BLD: 8.25 K/UL — SIGNIFICANT CHANGE UP (ref 4.8–10.8)
WBC # FLD AUTO: 8.25 K/UL — SIGNIFICANT CHANGE UP (ref 4.8–10.8)

## 2022-10-20 PROCEDURE — 99233 SBSQ HOSP IP/OBS HIGH 50: CPT

## 2022-10-20 RX ORDER — WARFARIN SODIUM 2.5 MG/1
5 TABLET ORAL ONCE
Refills: 0 | Status: COMPLETED | OUTPATIENT
Start: 2022-10-20 | End: 2022-10-20

## 2022-10-20 RX ORDER — MAGNESIUM SULFATE 500 MG/ML
1 VIAL (ML) INJECTION ONCE
Refills: 0 | Status: COMPLETED | OUTPATIENT
Start: 2022-10-20 | End: 2022-10-20

## 2022-10-20 RX ORDER — HYDROMORPHONE HYDROCHLORIDE 2 MG/ML
2 INJECTION INTRAMUSCULAR; INTRAVENOUS; SUBCUTANEOUS ONCE
Refills: 0 | Status: DISCONTINUED | OUTPATIENT
Start: 2022-10-20 | End: 2022-10-20

## 2022-10-20 RX ADMIN — HYDROMORPHONE HYDROCHLORIDE 2 MILLIGRAM(S): 2 INJECTION INTRAMUSCULAR; INTRAVENOUS; SUBCUTANEOUS at 19:19

## 2022-10-20 RX ADMIN — MORPHINE SULFATE 120 MILLIGRAM(S): 50 CAPSULE, EXTENDED RELEASE ORAL at 13:05

## 2022-10-20 RX ADMIN — MORPHINE SULFATE 120 MILLIGRAM(S): 50 CAPSULE, EXTENDED RELEASE ORAL at 21:51

## 2022-10-20 RX ADMIN — Medication 110 MILLIGRAM(S): at 11:22

## 2022-10-20 RX ADMIN — Medication 50 MILLIGRAM(S): at 18:15

## 2022-10-20 RX ADMIN — MORPHINE SULFATE 120 MILLIGRAM(S): 50 CAPSULE, EXTENDED RELEASE ORAL at 14:46

## 2022-10-20 RX ADMIN — Medication 50 MILLIGRAM(S): at 06:05

## 2022-10-20 RX ADMIN — MORPHINE SULFATE 120 MILLIGRAM(S): 50 CAPSULE, EXTENDED RELEASE ORAL at 06:05

## 2022-10-20 RX ADMIN — Medication 2 MILLIGRAM(S): at 21:55

## 2022-10-20 RX ADMIN — HEPARIN SODIUM 900 UNIT(S)/HR: 5000 INJECTION INTRAVENOUS; SUBCUTANEOUS at 14:49

## 2022-10-20 RX ADMIN — WARFARIN SODIUM 5 MILLIGRAM(S): 2.5 TABLET ORAL at 21:51

## 2022-10-20 RX ADMIN — HEPARIN SODIUM 0 UNIT(S)/HR: 5000 INJECTION INTRAVENOUS; SUBCUTANEOUS at 05:58

## 2022-10-20 RX ADMIN — HEPARIN SODIUM 900 UNIT(S)/HR: 5000 INJECTION INTRAVENOUS; SUBCUTANEOUS at 06:20

## 2022-10-20 RX ADMIN — HYDROMORPHONE HYDROCHLORIDE 2 MILLIGRAM(S): 2 INJECTION INTRAMUSCULAR; INTRAVENOUS; SUBCUTANEOUS at 18:24

## 2022-10-20 RX ADMIN — Medication 100 GRAM(S): at 23:37

## 2022-10-20 RX ADMIN — Medication 100 MILLIGRAM(S): at 06:06

## 2022-10-20 RX ADMIN — SENNA PLUS 2 TABLET(S): 8.6 TABLET ORAL at 21:51

## 2022-10-20 RX ADMIN — Medication 100 MILLIGRAM(S): at 06:02

## 2022-10-20 RX ADMIN — HEPARIN SODIUM 900 UNIT(S)/HR: 5000 INJECTION INTRAVENOUS; SUBCUTANEOUS at 10:53

## 2022-10-20 RX ADMIN — MORPHINE SULFATE 120 MILLIGRAM(S): 50 CAPSULE, EXTENDED RELEASE ORAL at 06:35

## 2022-10-20 RX ADMIN — Medication 110 MILLIGRAM(S): at 18:15

## 2022-10-20 RX ADMIN — Medication 81 MILLIGRAM(S): at 13:06

## 2022-10-20 RX ADMIN — HEPARIN SODIUM 800 UNIT(S)/HR: 5000 INJECTION INTRAVENOUS; SUBCUTANEOUS at 20:28

## 2022-10-20 RX ADMIN — MORPHINE SULFATE 120 MILLIGRAM(S): 50 CAPSULE, EXTENDED RELEASE ORAL at 22:20

## 2022-10-20 RX ADMIN — Medication 2 MILLIGRAM(S): at 13:05

## 2022-10-20 NOTE — PATIENT PROFILE ADULT - FALL HARM RISK - UNIVERSAL INTERVENTIONS
Bed in lowest position, wheels locked, appropriate side rails in place/Call bell, personal items and telephone in reach/Instruct patient to call for assistance before getting out of bed or chair/Non-slip footwear when patient is out of bed/[place] to call system/Physically safe environment - no spills, clutter or unnecessary equipment/Purposeful Proactive Rounding/Room/bathroom lighting operational, light cord in reach

## 2022-10-20 NOTE — CONSULT NOTE ADULT - ASSESSMENT
51 year old woman with PMH of SLE  lupus with lupus anticoagulant, antiphospholipid syndrome,  rheumatoid arthritis, cervical cancer sp hysterectomy,  laryngeal cancer sp CT, RT, CHF s/p AICD+PPM, DVT/PE,  NSTEMI in 2015, subclavian stenosis s/p stent placement, HTN, HLD, PPM/AICD, , and recent admission from 09/22-10/09 for proximal occlusion of left subclavian vein status post in-stent restenosis presenting to ER for evaluation.  Patient states was recently switched from Coumadin to Lovenox during Recent Admission and States Is Now Having Painful Diffuse Ecchymosis. Complaining of Hematuria for the past 6 Days and burning sensation when she urinates.  Painful swelling and redness in left lower extremity at a previous ecchymotic area.   CT abdomen performed -No evidence of acute intra-abdominal pathology.    IMPRESSION;   Hematoma LLE laterally ( no evidence of E nodosa/erysipelas )  Possible associated cellulitis  No abscess    RECOMMENDATIONS;   Doxycycline 100 mg iv q12h and change to po Doxy 100 mg q12h for a total of 10 days on discharge  Please do not hesitate to recall ID if any questions arise either through [employer] [id number] or through [employer] teams

## 2022-10-20 NOTE — PROGRESS NOTE ADULT - ATTENDING COMMENTS
Patient seen and examined. Case discussed with resident physician, nursing staff     # SLE, APLA syndrome, VTE and PE  recent hospitalization at [place] for upper extremity swelling and was diagnosed with DVT- coumadin was transitioned to lovenox    # b/l LE and UE hematoma  # possible cellulitis  lovenox d/c'ed  continue heparin IV- for bridging to coumadin with higher INR target 3-4  monitor PTT, PT  continue IV doxycycline    #h/o long-QT syndrome and VT s/p AICD  #HFpEF  #COPD    Pending: attaining coumadin therapeutic level  #Chronic pain syndrome  - MS contin 60 q6hrs     #Anxiety  - Xanax 2mg TID PRN

## 2022-10-20 NOTE — PATIENT PROFILE ADULT - STATED REASON FOR ADMISSION
"I have Black and blue marks on both my legs after I was started on Lovenox at [facility] and I think one of them is infected."

## 2022-10-20 NOTE — PROGRESS NOTE ADULT - ASSESSMENT
52 y/o F w/ PMHx SLE, lupus anticoagulant, antiphospholipid syndrome (follows Dr. Sultana), rheumatoid arthritis, cervical cancer s/p hysterectomy, laryngeal cancer s/p multiple surgical scrapings of the vocal cords and lymphadenectomies and radiotherapy, h/o of Torsades de clint s/p COMA (17 days) w/ CHF s/p AICD+PPM in 2003, h/o DVT/PE, NSTEMI in 2015, subclavian stenosis s/p stent placement, HTN, HLD and recent admission to [facility] from 09/22-10/09 for proximal occlusion of left subclavian vein s/p in-stent restenosis where she was switched from coumadin to lovenox now presented to ED for diffuse ecchymosis and multiple nodular masses in all 4 extremities. Admitted for heparin coumadin bridging and management and work up of nodular abscesses.    #SLE  #Antiphospholipid Syndrome (APS)  #History of VTE/PE  #RA   - Previously on coumadin 5mg and recently switched to Lovenox at [facility]  - experiencing multiple bruises and nodular masses while on Lovenox  - on heparin gtt, PTT remains >200, adjust as per nomogram  - currently bridging to coumadin 5mg  - keep active type and screen (last 10/19)  - monitor hgb  - PTT >200 (10/20), INR 0.94  - f/u Heme/onc eval    #b/l LE and UE hematoma  #Suspected Cellulitis   - nodular masses in UE and LE b/l  - increased erythema and pain more so in LLE  - CT A/P - neg for retroperitoneal bleed  - s/p post vancomycin, metronidazole, aztreonam in the ED  - patient seen by ID (no evidence of erythema nodosum, erysipelas or abscess)  - recommending Doxycycline 100mg IV q12hrs as per ID  - recommending to discharge patient on Doxy 100mg q12hrs PO for total of 10 days    #Suspected Cystitis   - UA contaminated  - blood in urine for 6 days prior to admission, now resolved, likely associated w/ lovenox  - no CT evidence of cystitis   - s/p aztreonam in ED   - f/u UCx    #Hypomagnesemia  - Mag 1.6  - repleted  - f/u BMP    #h/o long-QT syndrome and VT s/p AICD  #HFpEF  - EF 63% (2019)  - s/p AICD placement (s/p laser lead extraction of ICD leads and device and reimplanted with new RA/RV lead and new ICD device (Medtronic) in 2021)    #COPD  - inhalers and nebs for COPD  - counselled pt to avoid smoking and secondary exposure  - CXR no acute cardiopulmonary pathologies    #Chronic pain syndrome  - MS contin 60 q6hrs     #Anxiety  - Xanax 2mg TID PRN    #Misc  - DVT ppx: on heparin gtt, bridging to coumadin  - GI ppx: none  - Diet: DASH/TLC  - Activity: AAT  - Code Status: Full Code  - Dispo: from home, anticipate in 3-5 days, will likely d/c home

## 2022-10-20 NOTE — CONSULT NOTE ADULT - SUBJECTIVE AND OBJECTIVE BOX
EWA ILEANA  52y, Female  Allergy: adhesives (Rash; Urticaria)  Arava (Anaphylaxis; Angioedema)  Avelox (Other)  Plaquenil (Other)  Vantin (Other (Mild))      All historical available data reviewed.    HPI:  51 year old woman with PMH of SLE  lupus with lupus anticoagulant, antiphospholipid syndrome,  rheumatoid arthritis, cervical cancer sp hysterectomy,  laryngeal cancer sp CT, RT, CHF s/p AICD+PPM, DVT/PE,  NSTEMI in , subclavian stenosis s/p stent placement, HTN, HLD, PPM/AICD, , and recent admission from -10/09 for proximal occlusion of left subclavian vein status post in-stent restenosis presenting to ER for evaluation.  Patient states was recently switched from Coumadin to Lovenox during Recent Admission and States Is Now Having Painful Diffuse Ecchymosis.  Patient Has Been Following up with hem/onc Dr. Sultana Who Recommended Admission for Bridging to Coumadin.  Patient Also Complaining of Hematuria for the past 6 Days and burning sensation when she urinates.  Furthermore reports painful swelling and redness in left lower extremity at a previous ecchymotic area.     ED course: /67, HR 71, RR 20, T 97.8, SpO2 99% on RA. CBC and CMP were unremarkable. UA positive for LE, small amount of blood, epithelial cells, few bacteria. No pyuria. CT abdomen performed -No evidence of acute intra-abdominal pathology.     (18 Oct 2022 22:06)    FAMILY HISTORY:  Family history of cervical cancer    FH: thyroid cancer      PAST MEDICAL & SURGICAL HISTORY:  Lupus      RA (rheumatoid arthritis)      HTN (hypertension)      CHF (congestive heart failure)      COPD (chronic obstructive pulmonary disease)      DVT (deep venous thrombosis)      Pulmonary embolism      History of laryngeal cancer      GERD (gastroesophageal reflux disease)      Cervical cancer      S/P appendectomy      S/P cholecystectomy      AICD (automatic cardioverter/defibrillator) present      S/P hysterectomy      History of back surgery      H/O foot surgery      S/P eye surgery      Subclavian arterial stenosis            VITALS:  T(F): 98.1, Max: 98.7 (10-19-22 @ 08:01)  HR: 63  BP: 145/72  RR: 19Vital Signs Last 24 Hrs  T(C): 36.7 (20 Oct 2022 04:47), Max: 37.1 (19 Oct 2022 08:01)  T(F): 98.1 (20 Oct 2022 04:47), Max: 98.7 (19 Oct 2022 08:01)  HR: 63 (20 Oct 2022 06:05) (63 - 73)  BP: 145/72 (20 Oct 2022 06:05) (115/56 - 205/89)  BP(mean): 102 (20 Oct 2022 06:05) (102 - 102)  RR: 19 (20 Oct 2022 04:47) (18 - 19)  SpO2: 100% (19 Oct 2022 21:40) (97% - 100%)    Parameters below as of 19 Oct 2022 19:46  Patient On (Oxygen Delivery Method): room air        TESTS & MEASUREMENTS:                        12.6   7.45  )-----------( 150      ( 19 Oct 2022 11:42 )             37.8     10    139  |  101  |  11  ----------------------------<  104<H>  3.5   |  27  |  0.8    Ca    9.2      19 Oct 2022 05:05  Mg     1.6     10-19    TPro  7.0  /  Alb  4.6  /  TBili  0.4  /  DBili  x   /  AST  34  /  ALT  39  /  AlkPhos  75  10-18    LIVER FUNCTIONS - ( 18 Oct 2022 17:09 )  Alb: 4.6 g/dL / Pro: 7.0 g/dL / ALK PHOS: 75 U/L / ALT: 39 U/L / AST: 34 U/L / GGT: x             Urinalysis Basic - ( 18 Oct 2022 17:14 )    Color: Yellow / Appearance: Clear / S.029 / pH: x  Gluc: x / Ketone: Trace  / Bili: Negative / Urobili: 6 mg/dL   Blood: x / Protein: 30 mg/dL / Nitrite: Negative   Leuk Esterase: Moderate / RBC: 12 /HPF / WBC 4 /HPF   Sq Epi: x / Non Sq Epi: 14 /HPF / Bacteria: Few          RADIOLOGY & ADDITIONAL TESTS:  Personal review of radiological diagnostics performed  Echo and EKG results noted when applicable.     MEDICATIONS:  ALBUTerol    90 MICROgram(s) HFA Inhaler 2 Puff(s) Inhalation every 6 hours PRN  ALPRAZolam 2 milliGRAM(s) Oral three times a day PRN  aspirin enteric coated 81 milliGRAM(s) Oral daily  aztreonam  IVPB 2000 milliGRAM(s) IV Intermittent every 8 hours  budesonide  80 MICROgram(s)/formoterol 4.5 MICROgram(s) Inhaler 2 Puff(s) Inhalation two times a day  heparin   Injectable 5500 Unit(s) IV Push every 6 hours PRN  heparin   Injectable 2500 Unit(s) IV Push every 6 hours PRN  heparin  Infusion. 1200 Unit(s)/Hr IV Continuous <Continuous>  hydrALAZINE 50 milliGRAM(s) Oral two times a day  metoprolol succinate  milliGRAM(s) Oral daily  morphine ER Tablet 120 milliGRAM(s) Oral three times a day  polyethylene glycol 3350 17 Gram(s) Oral daily  senna 2 Tablet(s) Oral at bedtime  vancomycin  IVPB 750 milliGRAM(s) IV Intermittent every 12 hours      ANTIBIOTICS:  aztreonam  IVPB 2000 milliGRAM(s) IV Intermittent every 8 hours  vancomycin  IVPB 750 milliGRAM(s) IV Intermittent every 12 hours

## 2022-10-21 LAB
-  AMIKACIN: SIGNIFICANT CHANGE UP
-  AMOXICILLIN/CLAVULANIC ACID: SIGNIFICANT CHANGE UP
-  AMPICILLIN/SULBACTAM: SIGNIFICANT CHANGE UP
-  AMPICILLIN: SIGNIFICANT CHANGE UP
-  AZTREONAM: SIGNIFICANT CHANGE UP
-  CEFAZOLIN: SIGNIFICANT CHANGE UP
-  CEFEPIME: SIGNIFICANT CHANGE UP
-  CEFOXITIN: SIGNIFICANT CHANGE UP
-  CEFTRIAXONE: SIGNIFICANT CHANGE UP
-  CIPROFLOXACIN: SIGNIFICANT CHANGE UP
-  ERTAPENEM: SIGNIFICANT CHANGE UP
-  GENTAMICIN: SIGNIFICANT CHANGE UP
-  IMIPENEM: SIGNIFICANT CHANGE UP
-  LEVOFLOXACIN: SIGNIFICANT CHANGE UP
-  MEROPENEM: SIGNIFICANT CHANGE UP
-  NITROFURANTOIN: SIGNIFICANT CHANGE UP
-  PIPERACILLIN/TAZOBACTAM: SIGNIFICANT CHANGE UP
-  TIGECYCLINE: SIGNIFICANT CHANGE UP
-  TOBRAMYCIN: SIGNIFICANT CHANGE UP
-  TRIMETHOPRIM/SULFAMETHOXAZOLE: SIGNIFICANT CHANGE UP
ALBUMIN SERPL ELPH-MCNC: 4.8 G/DL — SIGNIFICANT CHANGE UP (ref 3.5–5.2)
ALP SERPL-CCNC: 76 U/L — SIGNIFICANT CHANGE UP (ref 30–115)
ALT FLD-CCNC: 31 U/L — SIGNIFICANT CHANGE UP (ref 0–41)
ANION GAP SERPL CALC-SCNC: 11 MMOL/L — SIGNIFICANT CHANGE UP (ref 7–14)
APTT BLD: 133.7 SEC — CRITICAL HIGH (ref 27–39.2)
APTT BLD: 138.3 SEC — CRITICAL HIGH (ref 27–39.2)
APTT BLD: 79.2 SEC — CRITICAL HIGH (ref 27–39.2)
APTT BLD: 98.3 SEC — CRITICAL HIGH (ref 27–39.2)
AST SERPL-CCNC: 24 U/L — SIGNIFICANT CHANGE UP (ref 0–41)
BASOPHILS # BLD AUTO: 0.05 K/UL — SIGNIFICANT CHANGE UP (ref 0–0.2)
BASOPHILS NFR BLD AUTO: 0.6 % — SIGNIFICANT CHANGE UP (ref 0–1)
BILIRUB SERPL-MCNC: 0.5 MG/DL — SIGNIFICANT CHANGE UP (ref 0.2–1.2)
BUN SERPL-MCNC: 11 MG/DL — SIGNIFICANT CHANGE UP (ref 10–20)
CALCIUM SERPL-MCNC: 10.2 MG/DL — SIGNIFICANT CHANGE UP (ref 8.4–10.4)
CHLORIDE SERPL-SCNC: 104 MMOL/L — SIGNIFICANT CHANGE UP (ref 98–110)
CO2 SERPL-SCNC: 26 MMOL/L — SIGNIFICANT CHANGE UP (ref 17–32)
CREAT SERPL-MCNC: 0.7 MG/DL — SIGNIFICANT CHANGE UP (ref 0.7–1.5)
CULTURE RESULTS: SIGNIFICANT CHANGE UP
EGFR: 104 ML/MIN/1.73M2 — SIGNIFICANT CHANGE UP
EOSINOPHIL # BLD AUTO: 0.24 K/UL — SIGNIFICANT CHANGE UP (ref 0–0.7)
EOSINOPHIL NFR BLD AUTO: 2.7 % — SIGNIFICANT CHANGE UP (ref 0–8)
GLUCOSE SERPL-MCNC: 106 MG/DL — HIGH (ref 70–99)
HCT VFR BLD CALC: 39.3 % — SIGNIFICANT CHANGE UP (ref 37–47)
HGB BLD-MCNC: 13.2 G/DL — SIGNIFICANT CHANGE UP (ref 12–16)
IMM GRANULOCYTES NFR BLD AUTO: 0.5 % — HIGH (ref 0.1–0.3)
INR BLD: 1 RATIO — SIGNIFICANT CHANGE UP (ref 0.65–1.3)
LYMPHOCYTES # BLD AUTO: 2.01 K/UL — SIGNIFICANT CHANGE UP (ref 1.2–3.4)
LYMPHOCYTES # BLD AUTO: 22.9 % — SIGNIFICANT CHANGE UP (ref 20.5–51.1)
MAGNESIUM SERPL-MCNC: 1.9 MG/DL — SIGNIFICANT CHANGE UP (ref 1.8–2.4)
MCHC RBC-ENTMCNC: 29.1 PG — SIGNIFICANT CHANGE UP (ref 27–31)
MCHC RBC-ENTMCNC: 33.6 G/DL — SIGNIFICANT CHANGE UP (ref 32–37)
MCV RBC AUTO: 86.6 FL — SIGNIFICANT CHANGE UP (ref 81–99)
METHOD TYPE: SIGNIFICANT CHANGE UP
MONOCYTES # BLD AUTO: 0.66 K/UL — HIGH (ref 0.1–0.6)
MONOCYTES NFR BLD AUTO: 7.5 % — SIGNIFICANT CHANGE UP (ref 1.7–9.3)
NEUTROPHILS # BLD AUTO: 5.77 K/UL — SIGNIFICANT CHANGE UP (ref 1.4–6.5)
NEUTROPHILS NFR BLD AUTO: 65.8 % — SIGNIFICANT CHANGE UP (ref 42.2–75.2)
NRBC # BLD: 0 /100 WBCS — SIGNIFICANT CHANGE UP (ref 0–0)
ORGANISM # SPEC MICROSCOPIC CNT: SIGNIFICANT CHANGE UP
ORGANISM # SPEC MICROSCOPIC CNT: SIGNIFICANT CHANGE UP
PLATELET # BLD AUTO: 228 K/UL — SIGNIFICANT CHANGE UP (ref 130–400)
POTASSIUM SERPL-MCNC: 4 MMOL/L — SIGNIFICANT CHANGE UP (ref 3.5–5)
POTASSIUM SERPL-SCNC: 4 MMOL/L — SIGNIFICANT CHANGE UP (ref 3.5–5)
PROT SERPL-MCNC: 7.4 G/DL — SIGNIFICANT CHANGE UP (ref 6–8)
PROTHROM AB SERPL-ACNC: 11.4 SEC — SIGNIFICANT CHANGE UP (ref 9.95–12.87)
RBC # BLD: 4.54 M/UL — SIGNIFICANT CHANGE UP (ref 4.2–5.4)
RBC # FLD: 13.4 % — SIGNIFICANT CHANGE UP (ref 11.5–14.5)
SODIUM SERPL-SCNC: 141 MMOL/L — SIGNIFICANT CHANGE UP (ref 135–146)
SPECIMEN SOURCE: SIGNIFICANT CHANGE UP
WBC # BLD: 8.77 K/UL — SIGNIFICANT CHANGE UP (ref 4.8–10.8)
WBC # FLD AUTO: 8.77 K/UL — SIGNIFICANT CHANGE UP (ref 4.8–10.8)

## 2022-10-21 PROCEDURE — 99232 SBSQ HOSP IP/OBS MODERATE 35: CPT

## 2022-10-21 RX ORDER — NITROFURANTOIN MACROCRYSTAL 50 MG
100 CAPSULE ORAL
Refills: 0 | Status: DISCONTINUED | OUTPATIENT
Start: 2022-10-21 | End: 2022-10-22

## 2022-10-21 RX ORDER — WARFARIN SODIUM 2.5 MG/1
7.5 TABLET ORAL ONCE
Refills: 0 | Status: COMPLETED | OUTPATIENT
Start: 2022-10-21 | End: 2022-10-21

## 2022-10-21 RX ORDER — HYDROMORPHONE HYDROCHLORIDE 2 MG/ML
2 INJECTION INTRAMUSCULAR; INTRAVENOUS; SUBCUTANEOUS THREE TIMES A DAY
Refills: 0 | Status: DISCONTINUED | OUTPATIENT
Start: 2022-10-21 | End: 2022-10-27

## 2022-10-21 RX ORDER — HYDROMORPHONE HYDROCHLORIDE 2 MG/ML
2 INJECTION INTRAMUSCULAR; INTRAVENOUS; SUBCUTANEOUS ONCE
Refills: 0 | Status: DISCONTINUED | OUTPATIENT
Start: 2022-10-21 | End: 2022-10-21

## 2022-10-21 RX ADMIN — Medication 81 MILLIGRAM(S): at 12:41

## 2022-10-21 RX ADMIN — Medication 100 MILLIGRAM(S): at 17:29

## 2022-10-21 RX ADMIN — Medication 100 MILLIGRAM(S): at 05:48

## 2022-10-21 RX ADMIN — MORPHINE SULFATE 120 MILLIGRAM(S): 50 CAPSULE, EXTENDED RELEASE ORAL at 13:17

## 2022-10-21 RX ADMIN — HEPARIN SODIUM 0 UNIT(S)/HR: 5000 INJECTION INTRAVENOUS; SUBCUTANEOUS at 13:12

## 2022-10-21 RX ADMIN — MORPHINE SULFATE 120 MILLIGRAM(S): 50 CAPSULE, EXTENDED RELEASE ORAL at 05:48

## 2022-10-21 RX ADMIN — HYDROMORPHONE HYDROCHLORIDE 2 MILLIGRAM(S): 2 INJECTION INTRAMUSCULAR; INTRAVENOUS; SUBCUTANEOUS at 14:23

## 2022-10-21 RX ADMIN — HYDROMORPHONE HYDROCHLORIDE 2 MILLIGRAM(S): 2 INJECTION INTRAMUSCULAR; INTRAVENOUS; SUBCUTANEOUS at 12:41

## 2022-10-21 RX ADMIN — Medication 50 MILLIGRAM(S): at 17:29

## 2022-10-21 RX ADMIN — HEPARIN SODIUM 800 UNIT(S)/HR: 5000 INJECTION INTRAVENOUS; SUBCUTANEOUS at 04:37

## 2022-10-21 RX ADMIN — MORPHINE SULFATE 120 MILLIGRAM(S): 50 CAPSULE, EXTENDED RELEASE ORAL at 21:39

## 2022-10-21 RX ADMIN — WARFARIN SODIUM 7.5 MILLIGRAM(S): 2.5 TABLET ORAL at 21:36

## 2022-10-21 RX ADMIN — SENNA PLUS 2 TABLET(S): 8.6 TABLET ORAL at 21:36

## 2022-10-21 RX ADMIN — MORPHINE SULFATE 120 MILLIGRAM(S): 50 CAPSULE, EXTENDED RELEASE ORAL at 22:09

## 2022-10-21 RX ADMIN — MORPHINE SULFATE 120 MILLIGRAM(S): 50 CAPSULE, EXTENDED RELEASE ORAL at 14:23

## 2022-10-21 RX ADMIN — Medication 50 MILLIGRAM(S): at 05:48

## 2022-10-21 RX ADMIN — MORPHINE SULFATE 120 MILLIGRAM(S): 50 CAPSULE, EXTENDED RELEASE ORAL at 05:54

## 2022-10-21 NOTE — PROGRESS NOTE ADULT - ASSESSMENT
52 y/o F w/ PMHx SLE, lupus anticoagulant, antiphospholipid syndrome (follows Dr. Sultana), rheumatoid arthritis, cervical cancer s/p hysterectomy, laryngeal cancer s/p multiple surgical scrapings of the vocal cords and lymphadenectomies and radiotherapy, h/o of Torsades de clint s/p COMA (17 days) w/ CHF s/p AICD+PPM in 2003, h/o DVT/PE, NSTEMI in 2015, subclavian stenosis s/p stent placement, HTN, HLD and recent admission to [facility] from 09/22-10/09 for proximal occlusion of left subclavian vein s/p in-stent restenosis where she was switched from coumadin to lovenox now presented to ED for diffuse ecchymosis and multiple nodular masses in all 4 extremities. Admitted for heparin coumadin bridging and management and work up of nodular abscesses.    # b/l LE and UE hematoma  # h/o SLE, APLA syndrome,  VTE and PE  recent hospitalization at [facility] for upper extremity swelling and was diagnosed with subclavian vein in stent stenosis  continue anticoagulation- lovenox d/c'ed  continue heparin IV- for bridging to coumadin with higher INR target 3-4  monitor PTT, PT  monitor hg  give coumadin 7.5 mg tonight    # possible cellulitis  continue doxycycline--> switch to PO    #h/o long-QT syndrome and VT s/p AICD  #HFpEF  #COPD    # cysitis   UCx- <49K E colil; monitor sensitivity  patient c/o some dysuria  start on vantin 100 mg PO BID for 5 days    #Hypomagnesemia  - Mag 1.6  - repleted  - f/u BMP    #h/o long-QT syndrome and VT s/p AICD  #HFpEF  - EF 63% (2019)  - s/p AICD placement (s/p laser lead extraction of ICD leads and device and reimplanted with new RA/RV lead and new ICD device (Medtronic) in 2021)    #COPD  - inhalers and nebs for COPD  - CXR no acute cardiopulmonary pathologies    #Chronic pain syndrome  - MS contin 60 q6hrs   - start PRN dilaudid for break thorugh pain    #Anxiety  - Xanax 2mg TID PRN      Pending: attaining coumadin therapeutic level  Dispo: home 50 y/o F w/ PMHx SLE, lupus anticoagulant, antiphospholipid syndrome (follows Dr. Sultana), rheumatoid arthritis, cervical cancer s/p hysterectomy, laryngeal cancer s/p multiple surgical scrapings of the vocal cords and lymphadenectomies and radiotherapy, h/o of Torsades de clint s/p COMA (17 days) w/ CHF s/p AICD+PPM in 2003, h/o DVT/PE, NSTEMI in 2015, subclavian stenosis s/p stent placement, HTN, HLD and recent admission to [facility] from 09/22-10/09 for proximal occlusion of left subclavian vein s/p in-stent restenosis where she was switched from coumadin to lovenox now presented to ED for diffuse ecchymosis and multiple nodular masses in all 4 extremities. Admitted for heparin coumadin bridging and management and work up of nodular abscesses.    # b/l LE and UE hematoma  # h/o SLE, APLA syndrome,  VTE and PE  recent hospitalization at [facility] for upper extremity swelling and was diagnosed with subclavian vein in stent stenosis  continue anticoagulation- lovenox d/c'ed  continue heparin IV- for bridging to coumadin with higher INR target 3-4  monitor PTT, PT  monitor hg  give coumadin 7.5 mg tonight    # possible cellulitis  continue doxycycline--> switch to PO    #h/o long-QT syndrome and VT s/p AICD  #HFpEF  #COPD    # cysitis   UCx- <49K E colil; monitor sensitivity  patient c/o some dysuria  patient allergic to vantin  start on nitrofurantoin for 5 days    #Hypomagnesemia- improved  - Mag 1.9    #h/o long-QT syndrome and VT s/p AICD  #HFpEF  - EF 63% (2019)  - s/p AICD placement (s/p laser lead extraction of ICD leads and device and reimplanted with new RA/RV lead and new ICD device (Medtronic) in 2021)    #COPD  - inhalers and nebs for COPD  - CXR no acute cardiopulmonary pathologies    #Chronic pain syndrome  - MS contin 60 q6hrs   - start PRN dilaudid for break thorugh pain    #Anxiety  - Xanax 2mg TID PRN      Pending: attaining coumadin therapeutic level  Dispo: home

## 2022-10-21 NOTE — PROGRESS NOTE ADULT - ASSESSMENT
50 y/o F w/ PMHx SLE, lupus anticoagulant, antiphospholipid syndrome (follows Dr. Sultana), rheumatoid arthritis, cervical cancer s/p hysterectomy, laryngeal cancer s/p multiple surgical scrapings of the vocal cords and lymphadenectomies and radiotherapy, h/o of Torsades de clint s/p COMA (17 days) w/ CHF s/p AICD+PPM in 2003, h/o DVT/PE, NSTEMI in 2015, subclavian stenosis s/p stent placement, HTN, HLD and recent admission to [facility] from 09/22-10/09 for proximal occlusion of left subclavian vein s/p in-stent restenosis where she was switched from coumadin to lovenox now presented to ED for diffuse ecchymosis and multiple nodular masses in all 4 extremities. Admitted for heparin coumadin bridging and management and work up of nodular abscesses.    #SLE  #Antiphospholipid Syndrome (APS)  #History of VTE/PE  #RA   - Previously on coumadin 5mg and recently switched to Lovenox at [facility]  - experiencing multiple bruises and nodular masses while on Lovenox  - on heparin gtt, PTT remains >200, adjust as per nomogram  - currently bridging to coumadin 5mg  - keep active type and screen (last 10/19)  - monitor hgb  - PTT >200 (10/20), INR 1.00  - can increase coumadin to 7.5mg today and repeat INR tomorrow  - f/u Heme/onc eval    #b/l LE and UE hematoma  #Suspected Cellulitis   - nodular masses in UE and LE b/l  - increased erythema and pain more so in LLE  - CT A/P - neg for retroperitoneal bleed  - s/p post vancomycin, metronidazole, aztreonam in the ED  - patient seen by ID (no evidence of erythema nodosum, erysipelas or abscess)  - recommending Doxycycline 100mg IV q12hrs as per ID - changing to PO  - recommending to discharge patient on Doxy 100mg q12hrs PO for total of 10 days    #Asymptomatic Bacteruria   - UA contaminated  - blood in urine for 6 days prior to admission, now resolved, likely associated w/ lovenox  - no CT evidence of cystitis  - s/p aztreonam in ED  - repeat UCx showed e. coli    #Hypomagnesemia (resolved)  - Mag 1.6  - repleted  - resolved    #h/o long-QT syndrome and VT s/p AICD  #HFpEF  - EF 63% (2019)  - s/p AICD placement (s/p laser lead extraction of ICD leads and device and reimplanted with new RA/RV lead and new ICD device (Medtronic) in 2021)    #COPD  - inhalers and nebs for COPD  - counselled pt to avoid smoking and secondary exposure  - CXR no acute cardiopulmonary pathologies    #Chronic pain syndrome  - Morphine ER 120mg TID  - considering starting morphine PRN? - palliative/pain management?    #Anxiety  - Xanax 2mg TID PRN    #Misc  - DVT ppx: on heparin gtt, bridging to coumadin  - GI ppx: none  - Diet: DASH/TLC  - Activity: AAT  - Code Status: Full Code  - Dispo: from home, anticipate in 3-5 days, will likely d/c home   52 y/o F w/ PMHx SLE, lupus anticoagulant, antiphospholipid syndrome (follows Dr. Sultana), rheumatoid arthritis, cervical cancer s/p hysterectomy, laryngeal cancer s/p multiple surgical scrapings of the vocal cords and lymphadenectomies and radiotherapy, h/o of Torsades de clint s/p COMA (17 days) w/ CHF s/p AICD+PPM in 2003, h/o DVT/PE, NSTEMI in 2015, subclavian stenosis s/p stent placement, HTN, HLD and recent admission to [facility] from 09/22-10/09 for proximal occlusion of left subclavian vein s/p in-stent restenosis where she was switched from coumadin to lovenox now presented to ED for diffuse ecchymosis and multiple nodular masses in all 4 extremities. Admitted for heparin coumadin bridging and management and work up of nodular abscesses.    #SLE  #Antiphospholipid Syndrome (APS)  #History of VTE/PE  #RA   - Previously on coumadin 5mg and recently switched to Lovenox at [facility]  - experiencing multiple bruises and nodular masses while on Lovenox  - on heparin gtt, PTT remains >200, adjust as per nomogram  - currently bridging to coumadin 5mg  - keep active type and screen (last 10/19)  - monitor hgb  - PTT >200 (10/20), INR 1.00  - can increase coumadin to 7.5mg today and repeat INR tomorrow  - f/u Heme/onc eval    #b/l LE and UE hematoma  #Suspected Cellulitis   - nodular masses in UE and LE b/l  - increased erythema and pain more so in LLE  - CT A/P - neg for retroperitoneal bleed  - s/p post vancomycin, metronidazole, aztreonam in the ED  - patient seen by ID (no evidence of erythema nodosum, erysipelas or abscess)  - recommending Doxycycline 100mg IV q12hrs as per ID - changing to PO  - recommending to discharge patient on Doxy 100mg q12hrs PO for total of 10 days    #Cystitis  - UA contaminated  - blood in urine for 6 days prior to admission, now resolved, likely associated w/ lovenox  - no CT evidence of cystitis  - complains of urinary frequency and dysuria today  - s/p aztreonam in ED  - repeat UCx showed e. coli  - placed on Macrobid for 5 days    #Hypomagnesemia (resolved)  - Mag 1.6  - repleted  - resolved    #h/o long-QT syndrome and VT s/p AICD  #HFpEF  - EF 63% (2019)  - s/p AICD placement (s/p laser lead extraction of ICD leads and device and reimplanted with new RA/RV lead and new ICD device (Medtronic) in 2021)    #COPD  - inhalers and nebs for COPD  - counselled pt to avoid smoking and secondary exposure  - CXR no acute cardiopulmonary pathologies    #Chronic pain syndrome  - Morphine ER 120mg TID  - considering starting morphine PRN? - palliative/pain management?    #Anxiety  - Xanax 2mg TID PRN    #Misc  - DVT ppx: on heparin gtt, bridging to coumadin  - GI ppx: none  - Diet: DASH/TLC  - Activity: AAT  - Code Status: Full Code  - Dispo: from home, anticipate in 3-5 days, will likely d/c home   53 y/o F w/ PMHx SLE, lupus anticoagulant, antiphospholipid syndrome (follows Dr. Sultana), rheumatoid arthritis, cervical cancer s/p hysterectomy, laryngeal cancer s/p multiple surgical scrapings of the vocal cords and lymphadenectomies and radiotherapy, h/o of Torsades de clint s/p COMA (17 days) w/ CHF s/p AICD+PPM in 2003, h/o DVT/PE, NSTEMI in 2015, subclavian stenosis s/p stent placement, HTN, HLD and recent admission to [facility] from 09/22-10/09 for proximal occlusion of left subclavian vein s/p in-stent restenosis where she was switched from coumadin to lovenox now presented to ED for diffuse ecchymosis and multiple nodular masses in all 4 extremities. Admitted for heparin coumadin bridging and management and work up of nodular abscesses.    #SLE  #Antiphospholipid Syndrome (APS)  #History of VTE/PE  #RA   - Previously on coumadin 5mg and recently switched to Lovenox at [facility]  - experiencing multiple bruises and nodular masses while on Lovenox  - on heparin gtt, PTT remains >200, adjust as per nomogram  - currently bridging to coumadin 5mg  - keep active type and screen (last 10/19)  - monitor hgb  - PTT >200 (10/20), INR 1.00  - can increase coumadin to 7.5mg today and repeat INR tomorrow  - f/u Heme/onc eval    #b/l LE and UE hematoma  #Suspected Cellulitis   - nodular masses in UE and LE b/l  - increased erythema and pain more so in LLE  - CT A/P - neg for retroperitoneal bleed  - s/p post vancomycin, metronidazole, aztreonam in the ED  - patient seen by ID (no evidence of erythema nodosum, erysipelas or abscess)  - recommending Doxycycline 100mg IV q12hrs as per ID - changing to PO  - recommending to discharge patient on Doxy 100mg q12hrs PO for total of 10 days    #Cystitis  - UA contaminated  - blood in urine for 6 days prior to admission, now resolved, likely associated w/ lovenox  - no CT evidence of cystitis  - complains of urinary frequency and dysuria today  - s/p aztreonam in ED  - repeat UCx showed e. coli  - placed on Macrobid for 5 days    #Hypomagnesemia (resolved)  - Mag 1.6  - repleted  - resolved    #h/o long-QT syndrome and VT s/p AICD  #HFpEF  - EF 63% (2019)  - s/p AICD placement (s/p laser lead extraction of ICD leads and device and reimplanted with new RA/RV lead and new ICD device (Medtronic) in 2021)    #COPD  - inhalers and nebs for COPD  - counselled pt to avoid smoking and secondary exposure  - CXR no acute cardiopulmonary pathologies    #Chronic pain syndrome  - Morphine ER 120mg TID  - considering starting morphine PRN? - palliative/pain management?    #Anxiety  - Xanax 2mg TID PRN    #Misc  - DVT ppx: on heparin gtt, bridging to coumadin  - GI ppx: none  - Diet: DASH/TLC  - Activity: AAT  - Code Status: Full Code  - Dispo: from home, anticipate in 3-5 days, will likely d/c home

## 2022-10-22 LAB
ALBUMIN SERPL ELPH-MCNC: 4.6 G/DL — SIGNIFICANT CHANGE UP (ref 3.5–5.2)
ALP SERPL-CCNC: 68 U/L — SIGNIFICANT CHANGE UP (ref 30–115)
ALT FLD-CCNC: 24 U/L — SIGNIFICANT CHANGE UP (ref 0–41)
ANION GAP SERPL CALC-SCNC: 10 MMOL/L — SIGNIFICANT CHANGE UP (ref 7–14)
APTT BLD: 63.7 SEC — HIGH (ref 27–39.2)
AST SERPL-CCNC: 19 U/L — SIGNIFICANT CHANGE UP (ref 0–41)
BASOPHILS # BLD AUTO: 0.04 K/UL — SIGNIFICANT CHANGE UP (ref 0–0.2)
BASOPHILS NFR BLD AUTO: 0.5 % — SIGNIFICANT CHANGE UP (ref 0–1)
BILIRUB SERPL-MCNC: 0.4 MG/DL — SIGNIFICANT CHANGE UP (ref 0.2–1.2)
BUN SERPL-MCNC: 18 MG/DL — SIGNIFICANT CHANGE UP (ref 10–20)
CALCIUM SERPL-MCNC: 9.9 MG/DL — SIGNIFICANT CHANGE UP (ref 8.4–10.5)
CHLORIDE SERPL-SCNC: 100 MMOL/L — SIGNIFICANT CHANGE UP (ref 98–110)
CO2 SERPL-SCNC: 27 MMOL/L — SIGNIFICANT CHANGE UP (ref 17–32)
CREAT SERPL-MCNC: 0.9 MG/DL — SIGNIFICANT CHANGE UP (ref 0.7–1.5)
EGFR: 77 ML/MIN/1.73M2 — SIGNIFICANT CHANGE UP
EOSINOPHIL # BLD AUTO: 0.26 K/UL — SIGNIFICANT CHANGE UP (ref 0–0.7)
EOSINOPHIL NFR BLD AUTO: 3.1 % — SIGNIFICANT CHANGE UP (ref 0–8)
GLUCOSE SERPL-MCNC: 103 MG/DL — HIGH (ref 70–99)
HCT VFR BLD CALC: 37.2 % — SIGNIFICANT CHANGE UP (ref 37–47)
HGB BLD-MCNC: 12.5 G/DL — SIGNIFICANT CHANGE UP (ref 12–16)
IMM GRANULOCYTES NFR BLD AUTO: 0.5 % — HIGH (ref 0.1–0.3)
INR BLD: 1.41 RATIO — HIGH (ref 0.65–1.3)
LYMPHOCYTES # BLD AUTO: 2.95 K/UL — SIGNIFICANT CHANGE UP (ref 1.2–3.4)
LYMPHOCYTES # BLD AUTO: 35.5 % — SIGNIFICANT CHANGE UP (ref 20.5–51.1)
MAGNESIUM SERPL-MCNC: 1.7 MG/DL — LOW (ref 1.8–2.4)
MCHC RBC-ENTMCNC: 29.6 PG — SIGNIFICANT CHANGE UP (ref 27–31)
MCHC RBC-ENTMCNC: 33.6 G/DL — SIGNIFICANT CHANGE UP (ref 32–37)
MCV RBC AUTO: 87.9 FL — SIGNIFICANT CHANGE UP (ref 81–99)
MONOCYTES # BLD AUTO: 0.67 K/UL — HIGH (ref 0.1–0.6)
MONOCYTES NFR BLD AUTO: 8.1 % — SIGNIFICANT CHANGE UP (ref 1.7–9.3)
NEUTROPHILS # BLD AUTO: 4.35 K/UL — SIGNIFICANT CHANGE UP (ref 1.4–6.5)
NEUTROPHILS NFR BLD AUTO: 52.3 % — SIGNIFICANT CHANGE UP (ref 42.2–75.2)
NRBC # BLD: 0 /100 WBCS — SIGNIFICANT CHANGE UP (ref 0–0)
PLATELET # BLD AUTO: 224 K/UL — SIGNIFICANT CHANGE UP (ref 130–400)
POTASSIUM SERPL-MCNC: 4.4 MMOL/L — SIGNIFICANT CHANGE UP (ref 3.5–5)
POTASSIUM SERPL-SCNC: 4.4 MMOL/L — SIGNIFICANT CHANGE UP (ref 3.5–5)
PROT SERPL-MCNC: 7 G/DL — SIGNIFICANT CHANGE UP (ref 6–8)
PROTHROM AB SERPL-ACNC: 16.3 SEC — HIGH (ref 9.95–12.87)
RBC # BLD: 4.23 M/UL — SIGNIFICANT CHANGE UP (ref 4.2–5.4)
RBC # FLD: 13.4 % — SIGNIFICANT CHANGE UP (ref 11.5–14.5)
SODIUM SERPL-SCNC: 137 MMOL/L — SIGNIFICANT CHANGE UP (ref 135–146)
WBC # BLD: 8.31 K/UL — SIGNIFICANT CHANGE UP (ref 4.8–10.8)
WBC # FLD AUTO: 8.31 K/UL — SIGNIFICANT CHANGE UP (ref 4.8–10.8)

## 2022-10-22 PROCEDURE — 99232 SBSQ HOSP IP/OBS MODERATE 35: CPT

## 2022-10-22 RX ORDER — MAGNESIUM SULFATE 500 MG/ML
2 VIAL (ML) INJECTION ONCE
Refills: 0 | Status: DISCONTINUED | OUTPATIENT
Start: 2022-10-22 | End: 2022-10-22

## 2022-10-22 RX ORDER — ONDANSETRON 8 MG/1
4 TABLET, FILM COATED ORAL EVERY 6 HOURS
Refills: 0 | Status: DISCONTINUED | OUTPATIENT
Start: 2022-10-22 | End: 2022-10-23

## 2022-10-22 RX ORDER — MAGNESIUM OXIDE 400 MG ORAL TABLET 241.3 MG
400 TABLET ORAL
Refills: 0 | Status: COMPLETED | OUTPATIENT
Start: 2022-10-22 | End: 2022-10-24

## 2022-10-22 RX ORDER — ONDANSETRON 8 MG/1
8 TABLET, FILM COATED ORAL ONCE
Refills: 0 | Status: DISCONTINUED | OUTPATIENT
Start: 2022-10-22 | End: 2022-10-22

## 2022-10-22 RX ORDER — WARFARIN SODIUM 2.5 MG/1
7.5 TABLET ORAL ONCE
Refills: 0 | Status: COMPLETED | OUTPATIENT
Start: 2022-10-22 | End: 2022-10-22

## 2022-10-22 RX ORDER — ONDANSETRON 8 MG/1
4 TABLET, FILM COATED ORAL ONCE
Refills: 0 | Status: COMPLETED | OUTPATIENT
Start: 2022-10-22 | End: 2022-10-22

## 2022-10-22 RX ADMIN — Medication 100 MILLIGRAM(S): at 05:21

## 2022-10-22 RX ADMIN — Medication 1 TABLET(S): at 18:48

## 2022-10-22 RX ADMIN — MAGNESIUM OXIDE 400 MG ORAL TABLET 400 MILLIGRAM(S): 241.3 TABLET ORAL at 18:48

## 2022-10-22 RX ADMIN — ONDANSETRON 4 MILLIGRAM(S): 8 TABLET, FILM COATED ORAL at 23:34

## 2022-10-22 RX ADMIN — ONDANSETRON 4 MILLIGRAM(S): 8 TABLET, FILM COATED ORAL at 01:36

## 2022-10-22 RX ADMIN — Medication 81 MILLIGRAM(S): at 13:49

## 2022-10-22 RX ADMIN — MORPHINE SULFATE 120 MILLIGRAM(S): 50 CAPSULE, EXTENDED RELEASE ORAL at 13:49

## 2022-10-22 RX ADMIN — HYDROMORPHONE HYDROCHLORIDE 2 MILLIGRAM(S): 2 INJECTION INTRAMUSCULAR; INTRAVENOUS; SUBCUTANEOUS at 20:34

## 2022-10-22 RX ADMIN — HEPARIN SODIUM 0 UNIT(S)/HR: 5000 INJECTION INTRAVENOUS; SUBCUTANEOUS at 00:04

## 2022-10-22 RX ADMIN — Medication 2 MILLIGRAM(S): at 05:57

## 2022-10-22 RX ADMIN — MORPHINE SULFATE 120 MILLIGRAM(S): 50 CAPSULE, EXTENDED RELEASE ORAL at 05:20

## 2022-10-22 RX ADMIN — WARFARIN SODIUM 7.5 MILLIGRAM(S): 2.5 TABLET ORAL at 21:16

## 2022-10-22 RX ADMIN — ONDANSETRON 4 MILLIGRAM(S): 8 TABLET, FILM COATED ORAL at 17:23

## 2022-10-22 RX ADMIN — MORPHINE SULFATE 120 MILLIGRAM(S): 50 CAPSULE, EXTENDED RELEASE ORAL at 06:30

## 2022-10-22 RX ADMIN — Medication 2 MILLIGRAM(S): at 18:48

## 2022-10-22 RX ADMIN — Medication 100 MILLIGRAM(S): at 18:48

## 2022-10-22 RX ADMIN — HEPARIN SODIUM 0 UNIT(S)/HR: 5000 INJECTION INTRAVENOUS; SUBCUTANEOUS at 14:43

## 2022-10-22 RX ADMIN — MORPHINE SULFATE 120 MILLIGRAM(S): 50 CAPSULE, EXTENDED RELEASE ORAL at 21:18

## 2022-10-22 NOTE — PROGRESS NOTE ADULT - ASSESSMENT
52 y/o F w/ PMHx SLE, lupus anticoagulant, antiphospholipid syndrome (follows Dr. Sultana), rheumatoid arthritis, cervical cancer s/p hysterectomy, laryngeal cancer s/p multiple surgical scrapings of the vocal cords and lymphadenectomies and radiotherapy, h/o of Torsades de clint s/p COMA (17 days) w/ CHF s/p AICD+PPM in 2003, h/o DVT/PE, NSTEMI in 2015, subclavian stenosis s/p stent placement, HTN, HLD and [facility] for proximal occlusion of left subclavian vein s/p in-stent restenosis where she was switched from coumadin to lovenox now presented to ED for diffuse ecchymosis and multiple nodular masses in all 4 extremities. Admitted for heparin coumadin bridging and management and work up of nodular abscesses.    # b/l LE and UE hematoma  # h/o SLE, APLA syndrome,  VTE and PE  recent hospitalization at [place] for upper extremity swelling and was diagnosed with subclavian vein in stent stenosis  continue anticoagulation- lovenox d/c'ed  continue heparin IV- for bridging to coumadin with higher INR target 3-4  monitor PTT, PT  monitor hg  give coumadin 7.5 mg tonight    # nausea and vomiting episode  zofran s/l PRN  if patient unable to tolerate PO--> then need IV line and need switch antibiotics to IV doxy    # possible cellulitis  continue doxycycline    #h/o long-QT syndrome and VT s/p AICD  #HFpEF  #COPD    # cysitis   UCx- <49K E colil; monitor sensitivity  patient c/o some dysuria  patient allergic to vantin  start on nitrofurantoin for 5 days    #Hypomagnesemia- monitor    #h/o long-QT syndrome and VT s/p AICD  #HFpEF  - EF 63% (2019)  - s/p AICD placement (s/p laser lead extraction of ICD leads and device and reimplanted with new RA/RV lead and new ICD device ([employer]tr[employer]) in 2021)    #COPD  - inhalers and nebs for COPD  - CXR no acute cardiopulmonary pathologies    #Chronic pain syndrome  - MS contin 60 q6hrs   - start PRN dilaudid for break thorugh pain    #Anxiety  - Xanax 2mg TID PRN      Pending: attaining coumadin therapeutic level  Dispo: home   52 y/o F w/ PMHx SLE, lupus anticoagulant, antiphospholipid syndrome (follows Dr. Sultana), rheumatoid arthritis, cervical cancer s/p hysterectomy, laryngeal cancer s/p multiple surgical scrapings of the vocal cords and lymphadenectomies and radiotherapy, h/o of Torsades de clint s/p COMA (17 days) w/ CHF s/p AICD+PPM in 2003, h/o DVT/PE, NSTEMI in 2015, subclavian stenosis s/p stent placement, HTN, HLD and recent admission to [facility] from 09/22-10/09 for proximal occlusion of left subclavian vein s/p in-stent restenosis where she was switched from coumadin to lovenox now presented to ED for diffuse ecchymosis and multiple nodular masses in all 4 extremities. Admitted for heparin coumadin bridging and management and work up of nodular abscesses.    # b/l LE and UE hematoma  # h/o SLE, APLA syndrome,  VTE and PE  recent hospitalization at [place] for upper extremity swelling and was diagnosed with subclavian vein in stent stenosis  continue anticoagulation- lovenox d/c'ed  continue heparin IV- for bridging to coumadin with higher INR target 3-4  monitor PTT, PT  monitor hg  give coumadin 7.5 mg tonight    # nausea and vomiting episode  zofran s/l PRN  if patient unable to tolerate PO--> then need IV line and need switch antibiotics to IV doxy    # possible cellulitis  continue doxycycline    #h/o long-QT syndrome and VT s/p AICD  #HFpEF  #COPD    # cysitis   UCx- <49K E colil; Sn- bactrim  patient c/o  dysuria  started on nitrofurantoin on 10/21- patient unable to tolerate- chaged to bactrim    #Hypomagnesemia- monitor    #h/o long-QT syndrome and VT s/p AICD  #HFpEF  - EF 63% (2019)  - s/p AICD placement (s/p laser lead extraction of ICD leads and device and reimplanted with new RA/RV lead and new ICD device ([employer]tr[employer]) in 2021)    #COPD  - inhalers and nebs for COPD  - CXR no acute cardiopulmonary pathologies    #Chronic pain syndrome  - MS contin 60 q6hrs   - start PRN dilaudid for break thorugh pain    #Anxiety  - Xanax 2mg TID PRN      Pending: attaining coumadin therapeutic level  Dispo: home

## 2022-10-23 LAB
ALBUMIN SERPL ELPH-MCNC: 4.3 G/DL — SIGNIFICANT CHANGE UP (ref 3.5–5.2)
ALP SERPL-CCNC: 78 U/L — SIGNIFICANT CHANGE UP (ref 30–115)
ALT FLD-CCNC: 22 U/L — SIGNIFICANT CHANGE UP (ref 0–41)
ANION GAP SERPL CALC-SCNC: 16 MMOL/L — HIGH (ref 7–14)
APTT BLD: 82.2 SEC — CRITICAL HIGH (ref 27–39.2)
AST SERPL-CCNC: 22 U/L — SIGNIFICANT CHANGE UP (ref 0–41)
BASOPHILS # BLD AUTO: 0.03 K/UL — SIGNIFICANT CHANGE UP (ref 0–0.2)
BASOPHILS NFR BLD AUTO: 0.4 % — SIGNIFICANT CHANGE UP (ref 0–1)
BILIRUB SERPL-MCNC: 0.3 MG/DL — SIGNIFICANT CHANGE UP (ref 0.2–1.2)
BUN SERPL-MCNC: 22 MG/DL — HIGH (ref 10–20)
CALCIUM SERPL-MCNC: 9.9 MG/DL — SIGNIFICANT CHANGE UP (ref 8.4–10.5)
CHLORIDE SERPL-SCNC: 103 MMOL/L — SIGNIFICANT CHANGE UP (ref 98–110)
CO2 SERPL-SCNC: 23 MMOL/L — SIGNIFICANT CHANGE UP (ref 17–32)
CREAT SERPL-MCNC: 1.1 MG/DL — SIGNIFICANT CHANGE UP (ref 0.7–1.5)
EGFR: 60 ML/MIN/1.73M2 — SIGNIFICANT CHANGE UP
EOSINOPHIL # BLD AUTO: 0.22 K/UL — SIGNIFICANT CHANGE UP (ref 0–0.7)
EOSINOPHIL NFR BLD AUTO: 2.8 % — SIGNIFICANT CHANGE UP (ref 0–8)
GLUCOSE SERPL-MCNC: 124 MG/DL — HIGH (ref 70–99)
HCT VFR BLD CALC: 37.4 % — SIGNIFICANT CHANGE UP (ref 37–47)
HGB BLD-MCNC: 12.7 G/DL — SIGNIFICANT CHANGE UP (ref 12–16)
IMM GRANULOCYTES NFR BLD AUTO: 0.4 % — HIGH (ref 0.1–0.3)
INR BLD: 2.12 RATIO — HIGH (ref 0.65–1.3)
LYMPHOCYTES # BLD AUTO: 2.41 K/UL — SIGNIFICANT CHANGE UP (ref 1.2–3.4)
LYMPHOCYTES # BLD AUTO: 31.2 % — SIGNIFICANT CHANGE UP (ref 20.5–51.1)
MAGNESIUM SERPL-MCNC: 1.5 MG/DL — LOW (ref 1.8–2.4)
MCHC RBC-ENTMCNC: 29.4 PG — SIGNIFICANT CHANGE UP (ref 27–31)
MCHC RBC-ENTMCNC: 34 G/DL — SIGNIFICANT CHANGE UP (ref 32–37)
MCV RBC AUTO: 86.6 FL — SIGNIFICANT CHANGE UP (ref 81–99)
MONOCYTES # BLD AUTO: 0.68 K/UL — HIGH (ref 0.1–0.6)
MONOCYTES NFR BLD AUTO: 8.8 % — SIGNIFICANT CHANGE UP (ref 1.7–9.3)
NEUTROPHILS # BLD AUTO: 4.35 K/UL — SIGNIFICANT CHANGE UP (ref 1.4–6.5)
NEUTROPHILS NFR BLD AUTO: 56.4 % — SIGNIFICANT CHANGE UP (ref 42.2–75.2)
NRBC # BLD: 0 /100 WBCS — SIGNIFICANT CHANGE UP (ref 0–0)
PLATELET # BLD AUTO: 221 K/UL — SIGNIFICANT CHANGE UP (ref 130–400)
POTASSIUM SERPL-MCNC: 4.2 MMOL/L — SIGNIFICANT CHANGE UP (ref 3.5–5)
POTASSIUM SERPL-SCNC: 4.2 MMOL/L — SIGNIFICANT CHANGE UP (ref 3.5–5)
PROT SERPL-MCNC: 7.1 G/DL — SIGNIFICANT CHANGE UP (ref 6–8)
PROTHROM AB SERPL-ACNC: 24.7 SEC — HIGH (ref 9.95–12.87)
RBC # BLD: 4.32 M/UL — SIGNIFICANT CHANGE UP (ref 4.2–5.4)
RBC # FLD: 13.5 % — SIGNIFICANT CHANGE UP (ref 11.5–14.5)
SODIUM SERPL-SCNC: 142 MMOL/L — SIGNIFICANT CHANGE UP (ref 135–146)
WBC # BLD: 7.72 K/UL — SIGNIFICANT CHANGE UP (ref 4.8–10.8)
WBC # FLD AUTO: 7.72 K/UL — SIGNIFICANT CHANGE UP (ref 4.8–10.8)

## 2022-10-23 PROCEDURE — 99233 SBSQ HOSP IP/OBS HIGH 50: CPT

## 2022-10-23 RX ORDER — MAGNESIUM SULFATE 500 MG/ML
2 VIAL (ML) INJECTION DAILY
Refills: 0 | Status: COMPLETED | OUTPATIENT
Start: 2022-10-23 | End: 2022-10-25

## 2022-10-23 RX ORDER — ONDANSETRON 8 MG/1
4 TABLET, FILM COATED ORAL EVERY 6 HOURS
Refills: 0 | Status: DISCONTINUED | OUTPATIENT
Start: 2022-10-23 | End: 2022-10-23

## 2022-10-23 RX ORDER — WARFARIN SODIUM 2.5 MG/1
5 TABLET ORAL ONCE
Refills: 0 | Status: COMPLETED | OUTPATIENT
Start: 2022-10-23 | End: 2022-10-23

## 2022-10-23 RX ORDER — ONDANSETRON 8 MG/1
4 TABLET, FILM COATED ORAL EVERY 6 HOURS
Refills: 0 | Status: DISCONTINUED | OUTPATIENT
Start: 2022-10-23 | End: 2022-10-27

## 2022-10-23 RX ORDER — PANTOPRAZOLE SODIUM 20 MG/1
40 TABLET, DELAYED RELEASE ORAL DAILY
Refills: 0 | Status: DISCONTINUED | OUTPATIENT
Start: 2022-10-23 | End: 2022-10-27

## 2022-10-23 RX ADMIN — Medication 2 MILLIGRAM(S): at 11:43

## 2022-10-23 RX ADMIN — Medication 100 MILLIGRAM(S): at 06:38

## 2022-10-23 RX ADMIN — Medication 2 MILLIGRAM(S): at 21:54

## 2022-10-23 RX ADMIN — Medication 25 GRAM(S): at 21:46

## 2022-10-23 RX ADMIN — MAGNESIUM OXIDE 400 MG ORAL TABLET 400 MILLIGRAM(S): 241.3 TABLET ORAL at 11:37

## 2022-10-23 RX ADMIN — PANTOPRAZOLE SODIUM 40 MILLIGRAM(S): 20 TABLET, DELAYED RELEASE ORAL at 17:43

## 2022-10-23 RX ADMIN — Medication 2 MILLIGRAM(S): at 02:20

## 2022-10-23 RX ADMIN — ONDANSETRON 4 MILLIGRAM(S): 8 TABLET, FILM COATED ORAL at 13:43

## 2022-10-23 RX ADMIN — BUDESONIDE AND FORMOTEROL FUMARATE DIHYDRATE 2 PUFF(S): 160; 4.5 AEROSOL RESPIRATORY (INHALATION) at 11:41

## 2022-10-23 RX ADMIN — Medication 100 MILLIGRAM(S): at 05:24

## 2022-10-23 RX ADMIN — ONDANSETRON 4 MILLIGRAM(S): 8 TABLET, FILM COATED ORAL at 05:23

## 2022-10-23 RX ADMIN — Medication 81 MILLIGRAM(S): at 11:38

## 2022-10-23 RX ADMIN — MORPHINE SULFATE 120 MILLIGRAM(S): 50 CAPSULE, EXTENDED RELEASE ORAL at 05:28

## 2022-10-23 RX ADMIN — MORPHINE SULFATE 120 MILLIGRAM(S): 50 CAPSULE, EXTENDED RELEASE ORAL at 21:54

## 2022-10-23 RX ADMIN — Medication 110 MILLIGRAM(S): at 17:41

## 2022-10-23 RX ADMIN — MORPHINE SULFATE 120 MILLIGRAM(S): 50 CAPSULE, EXTENDED RELEASE ORAL at 13:42

## 2022-10-23 RX ADMIN — WARFARIN SODIUM 5 MILLIGRAM(S): 2.5 TABLET ORAL at 22:40

## 2022-10-23 RX ADMIN — POLYETHYLENE GLYCOL 3350 17 GRAM(S): 17 POWDER, FOR SOLUTION ORAL at 11:38

## 2022-10-23 RX ADMIN — Medication 1 TABLET(S): at 05:23

## 2022-10-23 RX ADMIN — Medication 50 MILLIGRAM(S): at 17:41

## 2022-10-23 NOTE — PROGRESS NOTE ADULT - ASSESSMENT
52 y/o F w/ PMHx SLE, lupus anticoagulant, antiphospholipid syndrome (follows Dr. Sultana), rheumatoid arthritis, cervical cancer s/p hysterectomy, laryngeal cancer s/p multiple surgical scrapings of the vocal cords and lymphadenectomies and radiotherapy, h/o of Torsades de clint s/p COMA (17 days) w/ CHF s/p AICD+PPM in 2003, h/o DVT/PE, NSTEMI in 2015, subclavian stenosis s/p stent placement, HTN, HLD and recent admission to [facility] from 09/22-10/09 for proximal occlusion of left subclavian vein s/p in-stent restenosis where she was switched from coumadin to lovenox now presented to ED for diffuse ecchymosis and multiple nodular masses in all 4 extremities. Admitted for heparin coumadin bridging and management and work up of nodular abscesses.    # b/l LE and UE hematoma  # h/o SLE, APLA syndrome,  VTE and PE  recent hospitalization at [place] for upper extremity swelling and was diagnosed with subclavian vein in stent stenosis  lovenox d/c'ed- continue anticoagulation- with heparin IV- for bridging to coumadin with higher INR target 3-4  monitor PTT, PT; PTT- at goal; continue heparin at 6U/hr; INR- 2.14; give 5 mg coumading tonight  monitor hg    # nausea and vomiting episode- improving;   - possibly related to gastritis from [facility]ait[facility]  zofran IV PRN  -start on protonix     #  cellulitis  continue doxycycline- changed back to IV    #h/o long-QT syndrome and VT s/p AICD  #HFpEF    # cysitis   UCx- <49K E colil; Sn- bactrim  patient c/o  dysuria  started on nitrofurantoin on 10/21- patient unable to tolerate- changed to bactrim on 10/22    #Hypomagnesemia- replace 2 g daily for 2 days; monitor    #COPD  - inhalers and nebs for COPD  - CXR no acute cardiopulmonary pathologies    #Chronic pain syndrome  - MS contin 60 q6hrs   - start PRN dilaudid for break thorugh pain    #Anxiety  - Xanax 2mg TID PRN      Pending: attaining coumadin therapeutic level  Dispo: home

## 2022-10-24 LAB
ALBUMIN SERPL ELPH-MCNC: 4.4 G/DL — SIGNIFICANT CHANGE UP (ref 3.5–5.2)
ALP SERPL-CCNC: 61 U/L — SIGNIFICANT CHANGE UP (ref 30–115)
ALT FLD-CCNC: 17 U/L — SIGNIFICANT CHANGE UP (ref 0–41)
ANION GAP SERPL CALC-SCNC: 13 MMOL/L — SIGNIFICANT CHANGE UP (ref 7–14)
APTT BLD: 110.7 SEC — CRITICAL HIGH (ref 27–39.2)
APTT BLD: 69.5 SEC — HIGH (ref 27–39.2)
APTT BLD: 96.4 SEC — CRITICAL HIGH (ref 27–39.2)
AST SERPL-CCNC: 15 U/L — SIGNIFICANT CHANGE UP (ref 0–41)
BASOPHILS # BLD AUTO: 0.05 K/UL — SIGNIFICANT CHANGE UP (ref 0–0.2)
BASOPHILS NFR BLD AUTO: 0.6 % — SIGNIFICANT CHANGE UP (ref 0–1)
BILIRUB SERPL-MCNC: 0.3 MG/DL — SIGNIFICANT CHANGE UP (ref 0.2–1.2)
BUN SERPL-MCNC: 19 MG/DL — SIGNIFICANT CHANGE UP (ref 10–20)
CALCIUM SERPL-MCNC: 9.5 MG/DL — SIGNIFICANT CHANGE UP (ref 8.4–10.5)
CHLORIDE SERPL-SCNC: 101 MMOL/L — SIGNIFICANT CHANGE UP (ref 98–110)
CO2 SERPL-SCNC: 24 MMOL/L — SIGNIFICANT CHANGE UP (ref 17–32)
CREAT SERPL-MCNC: 0.9 MG/DL — SIGNIFICANT CHANGE UP (ref 0.7–1.5)
EGFR: 77 ML/MIN/1.73M2 — SIGNIFICANT CHANGE UP
EOSINOPHIL # BLD AUTO: 0.3 K/UL — SIGNIFICANT CHANGE UP (ref 0–0.7)
EOSINOPHIL NFR BLD AUTO: 3.7 % — SIGNIFICANT CHANGE UP (ref 0–8)
GLUCOSE SERPL-MCNC: 90 MG/DL — SIGNIFICANT CHANGE UP (ref 70–99)
HCT VFR BLD CALC: 36.6 % — LOW (ref 37–47)
HGB BLD-MCNC: 12.3 G/DL — SIGNIFICANT CHANGE UP (ref 12–16)
IMM GRANULOCYTES NFR BLD AUTO: 0.5 % — HIGH (ref 0.1–0.3)
INR BLD: 2.15 RATIO — HIGH (ref 0.65–1.3)
LYMPHOCYTES # BLD AUTO: 3.45 K/UL — HIGH (ref 1.2–3.4)
LYMPHOCYTES # BLD AUTO: 42.2 % — SIGNIFICANT CHANGE UP (ref 20.5–51.1)
MAGNESIUM SERPL-MCNC: 1.6 MG/DL — LOW (ref 1.8–2.4)
MCHC RBC-ENTMCNC: 29.4 PG — SIGNIFICANT CHANGE UP (ref 27–31)
MCHC RBC-ENTMCNC: 33.6 G/DL — SIGNIFICANT CHANGE UP (ref 32–37)
MCV RBC AUTO: 87.6 FL — SIGNIFICANT CHANGE UP (ref 81–99)
MONOCYTES # BLD AUTO: 0.65 K/UL — HIGH (ref 0.1–0.6)
MONOCYTES NFR BLD AUTO: 7.9 % — SIGNIFICANT CHANGE UP (ref 1.7–9.3)
NEUTROPHILS # BLD AUTO: 3.69 K/UL — SIGNIFICANT CHANGE UP (ref 1.4–6.5)
NEUTROPHILS NFR BLD AUTO: 45.1 % — SIGNIFICANT CHANGE UP (ref 42.2–75.2)
NRBC # BLD: 0 /100 WBCS — SIGNIFICANT CHANGE UP (ref 0–0)
PLATELET # BLD AUTO: 219 K/UL — SIGNIFICANT CHANGE UP (ref 130–400)
POTASSIUM SERPL-MCNC: 4.3 MMOL/L — SIGNIFICANT CHANGE UP (ref 3.5–5)
POTASSIUM SERPL-SCNC: 4.3 MMOL/L — SIGNIFICANT CHANGE UP (ref 3.5–5)
PROT SERPL-MCNC: 6.8 G/DL — SIGNIFICANT CHANGE UP (ref 6–8)
PROTHROM AB SERPL-ACNC: 25.1 SEC — HIGH (ref 9.95–12.87)
RBC # BLD: 4.18 M/UL — LOW (ref 4.2–5.4)
RBC # FLD: 13.7 % — SIGNIFICANT CHANGE UP (ref 11.5–14.5)
SODIUM SERPL-SCNC: 138 MMOL/L — SIGNIFICANT CHANGE UP (ref 135–146)
WBC # BLD: 8.18 K/UL — SIGNIFICANT CHANGE UP (ref 4.8–10.8)
WBC # FLD AUTO: 8.18 K/UL — SIGNIFICANT CHANGE UP (ref 4.8–10.8)

## 2022-10-24 PROCEDURE — 99232 SBSQ HOSP IP/OBS MODERATE 35: CPT

## 2022-10-24 RX ORDER — MAGNESIUM SULFATE 500 MG/ML
2 VIAL (ML) INJECTION ONCE
Refills: 0 | Status: DISCONTINUED | OUTPATIENT
Start: 2022-10-24 | End: 2022-10-27

## 2022-10-24 RX ORDER — WARFARIN SODIUM 2.5 MG/1
7.5 TABLET ORAL ONCE
Refills: 0 | Status: COMPLETED | OUTPATIENT
Start: 2022-10-24 | End: 2022-10-24

## 2022-10-24 RX ADMIN — WARFARIN SODIUM 7.5 MILLIGRAM(S): 2.5 TABLET ORAL at 21:20

## 2022-10-24 RX ADMIN — Medication 110 MILLIGRAM(S): at 05:55

## 2022-10-24 RX ADMIN — Medication 100 MILLIGRAM(S): at 05:52

## 2022-10-24 RX ADMIN — MORPHINE SULFATE 120 MILLIGRAM(S): 50 CAPSULE, EXTENDED RELEASE ORAL at 05:56

## 2022-10-24 RX ADMIN — Medication 1 TABLET(S): at 05:46

## 2022-10-24 RX ADMIN — Medication 110 MILLIGRAM(S): at 17:02

## 2022-10-24 RX ADMIN — MAGNESIUM OXIDE 400 MG ORAL TABLET 400 MILLIGRAM(S): 241.3 TABLET ORAL at 08:14

## 2022-10-24 RX ADMIN — SENNA PLUS 2 TABLET(S): 8.6 TABLET ORAL at 21:20

## 2022-10-24 RX ADMIN — Medication 2 MILLIGRAM(S): at 21:30

## 2022-10-24 RX ADMIN — Medication 1 TABLET(S): at 17:02

## 2022-10-24 RX ADMIN — MORPHINE SULFATE 120 MILLIGRAM(S): 50 CAPSULE, EXTENDED RELEASE ORAL at 22:18

## 2022-10-24 RX ADMIN — Medication 50 MILLIGRAM(S): at 17:02

## 2022-10-24 RX ADMIN — MORPHINE SULFATE 120 MILLIGRAM(S): 50 CAPSULE, EXTENDED RELEASE ORAL at 21:30

## 2022-10-24 RX ADMIN — MORPHINE SULFATE 120 MILLIGRAM(S): 50 CAPSULE, EXTENDED RELEASE ORAL at 13:21

## 2022-10-24 RX ADMIN — Medication 25 GRAM(S): at 13:42

## 2022-10-24 RX ADMIN — HEPARIN SODIUM 0 UNIT(S)/HR: 5000 INJECTION INTRAVENOUS; SUBCUTANEOUS at 22:19

## 2022-10-24 RX ADMIN — MAGNESIUM OXIDE 400 MG ORAL TABLET 400 MILLIGRAM(S): 241.3 TABLET ORAL at 12:23

## 2022-10-24 RX ADMIN — Medication 81 MILLIGRAM(S): at 12:23

## 2022-10-24 RX ADMIN — HEPARIN SODIUM 0 UNIT(S)/HR: 5000 INJECTION INTRAVENOUS; SUBCUTANEOUS at 13:05

## 2022-10-24 RX ADMIN — Medication 2 MILLIGRAM(S): at 13:21

## 2022-10-24 NOTE — PROGRESS NOTE ADULT - ASSESSMENT
50 y/o F w/ PMHx SLE, lupus anticoagulant, antiphospholipid syndrome (follows Dr. Sultana), rheumatoid arthritis, cervical cancer s/p hysterectomy, laryngeal cancer s/p multiple surgical scrapings of the vocal cords and lymphadenectomies and radiotherapy, h/o of Torsades de clint s/p COMA (17 days) w/ CHF s/p AICD+PPM in 2003, h/o DVT/PE, NSTEMI in 2015, subclavian stenosis s/p stent placement, HTN, HLD and recent admission to [facility] from 09/22-10/09 for proximal occlusion of left subclavian vein s/p in-stent restenosis where she was switched from coumadin to lovenox now presented to ED for diffuse ecchymosis and multiple nodular masses in all 4 extremities. Admitted for heparin coumadin bridging and management and work up of nodular abscesses.    # b/l LE and UE hematoma  # h/o SLE, APLA syndrome,  VTE and PE  recent hospitalization at [place] for upper extremity swelling and was diagnosed with subclavian vein in stent stenosis  lovenox d/c'ed- continue anticoagulation- with heparin IV- for bridging to coumadin with higher INR target 3-4  monitor PTT, PT; PTT- at goal; continue heparin at 6U/hr; INR- 2.15; give 7.5 mg coumadin tonight  monitor hg    # nausea and vomiting episode- improved  - possibly related to gastritis from medicait[facility]  zofran IV PRN  -start on protonix     #  cellulitis  continue doxycycline- changed back to IV    #h/o long-QT syndrome and VT s/p AICD  #HFpEF    # cysitis   UCx- <49K E colil; Sn- bactrim  patient c/o  dysuria  started on nitrofurantoin on 10/21- patient unable to tolerate- changed to bactrim on 10/22    #Hypomagnesemia- replace 2 g daily for 2 days; monitor    #COPD  - inhalers and nebs for COPD  - CXR no acute cardiopulmonary pathologies    #Chronic pain syndrome  - MS contin 60 q6hrs   - start PRN dilaudid for break thorugh pain    #Anxiety  - Xanax 2mg TID PRN      Pending: attaining coumadin therapeutic level  Dispo: home     52 y/o F w/ PMHx SLE, lupus anticoagulant, antiphospholipid syndrome (follows Dr. Sultana), rheumatoid arthritis, cervical cancer s/p hysterectomy, laryngeal cancer s/p multiple surgical scrapings of the vocal cords and lymphadenectomies and radiotherapy, h/o of Torsades de clint s/p COMA (17 days) w/ CHF s/p AICD+PPM in 2003, h/o DVT/PE, NSTEMI in 2015, subclavian stenosis s/p stent placement, HTN, HLD and recent admission to [facility] from 09/22-10/09 for proximal occlusion of left subclavian vein s/p in-stent restenosis where she was switched from coumadin to lovenox now presented to ED for diffuse ecchymosis and multiple nodular masses in all 4 extremities. Admitted for heparin coumadin bridging and management and work up of nodular abscesses.    # b/l LE and UE hematoma  # h/o SLE, APLA syndrome,  VTE and PE  recent hospitalization at [place] for upper extremity swelling and was diagnosed with subclavian vein in stent stenosis  lovenox d/c'ed- continue anticoagulation- with heparin IV- for bridging to coumadin with higher INR target 3-4  monitor PTT, PT; PTT- at goal; continue heparin at 6U/hr; INR- 2.15; give 7.5 mg coumadin tonight  monitor hg    # nausea and vomiting episode- improved  - possibly related to gastritis from medicaitons  zofran IV PRN  -start on protonix     #  cellulitis  continue doxycycline- changed back to IV    #h/o long-QT syndrome and VT s/p AICD  #HFpEF    # cysitis   UCx- <49K E colil; Sn- bactrim  patient c/o  dysuria  started on nitrofurantoin on 10/21- patient unable to tolerate- changed to bactrim on 10/22    #Hypomagnesemia- replace 2 g daily for 2 days; monitor    #COPD  - inhalers and nebs for COPD  - CXR no acute cardiopulmonary pathologies    #Chronic pain syndrome  - MS contin 120 mg q8h  - start PRN dilaudid for break thorugh pain  - continue senna and miralax    #Anxiety  - Xanax 2mg TID PRN      Pending: attaining coumadin therapeutic level  Dispo: home

## 2022-10-24 NOTE — PHARMACOTHERAPY INTERVENTION NOTE - COMMENTS
52 year old Female      Indication: VTE/Antiphospholipid Syndrome   INR Goal: *Patient was last seen by [facility] clinic in September 2022; her goal was 2-3, but she recently had an admission to [facility] where she was found to have a clot while in therapeutic range, therefore NEW GOAL 3-4 as per [facility] Anticoagulation recommendations*   Home Dose: When goal was 2-3; 2.5mg Sun, Thurs, Sat and 5mg Mon, Tues, Wed, Fri; we are now altering inpatient doses to account for new INR goal   Bridge Therapy: Heparin Drip       ALBUTerol    90 MICROgram(s) HFA Inhaler 2 Puff(s) Inhalation every 6 hours PRN  ALPRAZolam 2 milliGRAM(s) Oral three times a day PRN  aspirin enteric coated 81 milliGRAM(s) Oral daily  budesonide  80 MICROgram(s)/formoterol 4.5 MICROgram(s) Inhaler 2 Puff(s) Inhalation two times a day  doxycycline IVPB 100 milliGRAM(s) IV Intermittent every 12 hours  heparin   Injectable 5500 Unit(s) IV Push every 6 hours PRN  heparin   Injectable 2500 Unit(s) IV Push every 6 hours PRN  heparin  Infusion. 1200 Unit(s)/Hr IV Continuous <Continuous>  hydrALAZINE 50 milliGRAM(s) Oral two times a day  HYDROmorphone   Tablet 2 milliGRAM(s) Oral three times a day PRN  magnesium sulfate  IVPB 2 Gram(s) IV Intermittent once  magnesium sulfate  IVPB 2 Gram(s) IV Intermittent daily  metoprolol succinate  milliGRAM(s) Oral daily  morphine ER Tablet 120 milliGRAM(s) Oral three times a day  ondansetron Injectable 4 milliGRAM(s) IV Push every 6 hours PRN  pantoprazole  Injectable 40 milliGRAM(s) IV Push daily  polyethylene glycol 3350 17 Gram(s) Oral daily  senna 2 Tablet(s) Oral at bedtime  trimethoprim  160 mG/sulfamethoxazole 800 mG 1 Tablet(s) Oral two times a day  warfarin 7.5 milliGRAM(s) Oral once        Drug Interactions: Bactrim       H/H: 12.3/36.6  PLT: 219  GFR: 77    Date---------------INR-----------------Dose    INR: 2.15 ratio (10-24-22 @ 06:34)  INR: 2.12 ratio (10-23-22 @ 10:41)  INR: 1.41 ratio (10-22-22 @ 06:15)  INR: 1.00 ratio (10-21-22 @ 07:45)  INR: 0.94 ratio (10-20-22 @ 08:47)  INR: 0.97 ratio (10-20-22 @ 02:53)  INR: 0.93 ratio (10-19-22 @ 05:05)  INR: 0.97 ratio (10-18-22 @ 17:09)      1. Recommended warfarin 7.5mg for tonight, INR 10/24 was 2.15; bridging to higher INR goal of 3-4.   2. Obtain INR tomorrow AM

## 2022-10-25 LAB
APTT BLD: 68.9 SEC — HIGH (ref 27–39.2)
APTT BLD: 74.8 SEC — CRITICAL HIGH (ref 27–39.2)
INR BLD: 2.56 RATIO — HIGH (ref 0.65–1.3)
PROTHROM AB SERPL-ACNC: 30.1 SEC — HIGH (ref 9.95–12.87)

## 2022-10-25 PROCEDURE — 99232 SBSQ HOSP IP/OBS MODERATE 35: CPT

## 2022-10-25 RX ORDER — WARFARIN SODIUM 2.5 MG/1
7.5 TABLET ORAL ONCE
Refills: 0 | Status: COMPLETED | OUTPATIENT
Start: 2022-10-25 | End: 2022-10-25

## 2022-10-25 RX ADMIN — Medication 110 MILLIGRAM(S): at 17:11

## 2022-10-25 RX ADMIN — Medication 2 MILLIGRAM(S): at 16:19

## 2022-10-25 RX ADMIN — MORPHINE SULFATE 120 MILLIGRAM(S): 50 CAPSULE, EXTENDED RELEASE ORAL at 14:30

## 2022-10-25 RX ADMIN — Medication 1 TABLET(S): at 05:17

## 2022-10-25 RX ADMIN — Medication 100 MILLIGRAM(S): at 05:16

## 2022-10-25 RX ADMIN — MORPHINE SULFATE 120 MILLIGRAM(S): 50 CAPSULE, EXTENDED RELEASE ORAL at 21:37

## 2022-10-25 RX ADMIN — Medication 2 MILLIGRAM(S): at 21:36

## 2022-10-25 RX ADMIN — Medication 50 MILLIGRAM(S): at 05:16

## 2022-10-25 RX ADMIN — HEPARIN SODIUM 0 UNIT(S)/HR: 5000 INJECTION INTRAVENOUS; SUBCUTANEOUS at 18:55

## 2022-10-25 RX ADMIN — SENNA PLUS 2 TABLET(S): 8.6 TABLET ORAL at 21:33

## 2022-10-25 RX ADMIN — Medication 2 MILLIGRAM(S): at 06:54

## 2022-10-25 RX ADMIN — Medication 110 MILLIGRAM(S): at 05:16

## 2022-10-25 RX ADMIN — MORPHINE SULFATE 120 MILLIGRAM(S): 50 CAPSULE, EXTENDED RELEASE ORAL at 05:41

## 2022-10-25 RX ADMIN — MORPHINE SULFATE 120 MILLIGRAM(S): 50 CAPSULE, EXTENDED RELEASE ORAL at 05:18

## 2022-10-25 RX ADMIN — MORPHINE SULFATE 120 MILLIGRAM(S): 50 CAPSULE, EXTENDED RELEASE ORAL at 22:07

## 2022-10-25 RX ADMIN — WARFARIN SODIUM 7.5 MILLIGRAM(S): 2.5 TABLET ORAL at 21:33

## 2022-10-25 RX ADMIN — HYDROMORPHONE HYDROCHLORIDE 2 MILLIGRAM(S): 2 INJECTION INTRAMUSCULAR; INTRAVENOUS; SUBCUTANEOUS at 18:39

## 2022-10-25 RX ADMIN — MORPHINE SULFATE 120 MILLIGRAM(S): 50 CAPSULE, EXTENDED RELEASE ORAL at 14:26

## 2022-10-25 RX ADMIN — Medication 81 MILLIGRAM(S): at 11:34

## 2022-10-25 NOTE — PROGRESS NOTE ADULT - ASSESSMENT
50 y/o F w/ PMHx SLE, lupus anticoagulant, antiphospholipid syndrome (follows Dr. Sultana), rheumatoid arthritis, cervical cancer s/p hysterectomy, laryngeal cancer s/p multiple surgical scrapings of the vocal cords and lymphadenectomies and radiotherapy, h/o of Torsades de clint s/p COMA (17 days) w/ CHF s/p AICD+PPM in 2003, h/o DVT/PE, NSTEMI in 2015, subclavian stenosis s/p stent placement, HTN, HLD and recent admission to [facility] from 09/22-10/09 for proximal occlusion of left subclavian vein s/p in-stent restenosis where she was switched from coumadin to lovenox now presented to ED for diffuse ecchymosis and multiple nodular masses in all 4 extremities. Admitted for heparin coumadin bridging and management and work up of nodular abscesses.    # b/l LE and UE hematoma  # h/o SLE, APLA syndrome,  VTE and PE  recent hospitalization at [place] for upper extremity swelling and was diagnosed with subclavian vein in-stent stenosis  lovenox d/c'ed- continue anticoagulation- with heparin IV- for bridging to coumadin with higher INR target 3-4  monitor PTT, PT; PTT- at goal; continue heparin at 6U/hr; INR- 2.56; give 7.5 mg coumadin tonight  monitor hg    # nausea and vomiting episode- improved  - possibly related to gastritis from medications  zofran IV PRN  -start on protonix     #  cellulitis  continue doxycycline- changed back to IV    #h/o long-QT syndrome and VT s/p AICD  #HFpEF    # cysitis   UCx- <49K E colil; Sn- bactrim  patient c/o  dysuria  started on nitrofurantoin on 10/21- patient unable to tolerate- changed to bactrim on 10/22; plan for total of 5 days    #Hypomagnesemia- replace 2 g daily for 2 days; monitor    #COPD  - inhalers and nebs for COPD  - CXR no acute cardiopulmonary pathologies    #Chronic pain syndrome  - MS contin 120 mg q8h  - start PRN dilaudid for break through pain  - continue senna and miralax    #Anxiety  - Xanax 2mg TID PRN      Pending: attaining coumadin therapeutic level; discharge plan with PO antibitoics  Dispo: home     52 y/o F w/ PMHx SLE, lupus anticoagulant, antiphospholipid syndrome (follows Dr. Sultana), rheumatoid arthritis, cervical cancer s/p hysterectomy, laryngeal cancer s/p multiple surgical scrapings of the vocal cords and lymphadenectomies and radiotherapy, h/o of Torsades de clint s/p COMA (17 days) w/ CHF s/p AICD+PPM in 2003, h/o DVT/PE, NSTEMI in 2015, subclavian stenosis s/p stent placement, HTN, HLD and recent admission to [facility] from 09/22-10/09 for proximal occlusion of left subclavian vein s/p in-stent restenosis where she was switched from coumadin to lovenox now presented to ED for diffuse ecchymosis and multiple nodular masses in all 4 extremities. Admitted for heparin coumadin bridging and management and work up of nodular abscesses.    # b/l LE and UE hematoma  # h/o SLE, APLA syndrome,  VTE and PE  recent hospitalization at [place] for upper extremity swelling and was diagnosed with subclavian vein in-stent stenosis  lovenox d/c'ed- continue anticoagulation- with heparin IV- for bridging to coumadin with higher INR target 3-4  monitor PTT, PT; PTT- at goal; continue heparin at 6U/hr; INR- 2.56; give 7.5 mg coumadin tonight  monitor hg    # nausea and vomiting episode- improved  - possibly related to gastritis from medications  zofran IV PRN  -start on protonix     #  cellulitis  continue doxycycline- changed back to IV    #h/o long-QT syndrome and VT s/p AICD  #HFpEF- compensated    # cysitis   UCx- <49K E colil; Sn- bactrim  patient c/o  dysuria  started on nitrofurantoin on 10/21- patient unable to tolerate- changed to bactrim on 10/22; plan for total of 5 days    #Hypomagnesemia- replace 2 g daily for 2 days; monitor    #COPD  - inhalers and nebs for COPD  - CXR no acute cardiopulmonary pathologies    #Chronic pain syndrome  - MS contin 120 mg q8h  - start PRN dilaudid for break through pain  - continue senna and miralax    #Anxiety  - Xanax 2mg TID PRN      Pending: attaining coumadin therapeutic level; discharge plan with PO antibitoics  Dispo: home

## 2022-10-25 NOTE — PROGRESS NOTE ADULT - ASSESSMENT
52 y/o F w/ PMHx SLE, lupus anticoagulant, antiphospholipid syndrome (follows Dr. Sultana), rheumatoid arthritis, cervical cancer s/p hysterectomy, laryngeal cancer s/p multiple surgical scrapings of the vocal cords and lymphadenectomies and radiotherapy, h/o of Torsades de clint s/p COMA (17 days) w/ CHF s/p AICD+PPM in 2003, h/o DVT/PE, NSTEMI in 2015, subclavian stenosis s/p stent placement, HTN, HLD and recent admission to [facility] from 09/22-10/09 for proximal occlusion of left subclavian vein s/p in-stent restenosis where she was switched from coumadin to lovenox now presented to ED for diffuse ecchymosis and multiple nodular masses in all 4 extremities. Admitted for heparin coumadin bridging and management and work up of nodular abscesses.    # b/l LE and UE hematoma  # h/o SLE, APLA syndrome,  VTE and PE  recent hospitalization at [place] for upper extremity swelling and was diagnosed with subclavian vein in stent stenosis  lovenox d/c'ed- continue anticoagulation- with heparin IV- for bridging to coumadin with higher INR target 3-4  monitor PTT, PT; PTT- at goal; continue heparin at 6U/hr; INR- 2.56 give 7.5 mg coumadin tonight. Monitor Hgb.    # nausea and vomiting episode- improving;   - possibly related to gastritis from [facility]  zofran IV PRN  -start on protonix     #  cellulitis  continue doxycycline- changed back to IV    #h/o long-QT syndrome and VT s/p AICD  #HFpEF    #cysitis   UCx- <49K E colil; Sn- bactrim  patient c/o  dysuria  started on nitrofurantoin on 10/21- patient unable to tolerate- changed to bactrim on 10/22    #Hypomagnesemia- replace 2 g daily for 2 days; monitor    #COPD  - inhalers and nebs for COPD  - CXR no acute cardiopulmonary pathologies    #Chronic pain syndrome  - MS contin 60 q6hrs   - start PRN dilaudid for break thorugh pain    #Anxiety  - Xanax 2mg TID PRN    Appointment at coumadin clinic for Monday Oct 31st at 10:30 AM    Pending: attaining coumadin therapeutic level  Dispo: home   50 y/o F w/ PMHx SLE, lupus anticoagulant, antiphospholipid syndrome (follows Dr. Sultana), rheumatoid arthritis, cervical cancer s/p hysterectomy, laryngeal cancer s/p multiple surgical scrapings of the vocal cords and lymphadenectomies and radiotherapy, h/o of Torsades de clint s/p COMA (17 days) w/ CHF s/p AICD+PPM in 2003, h/o DVT/PE, NSTEMI in 2015, subclavian stenosis s/p stent placement, HTN, HLD and recent admission to [facility] from 09/22-10/09 for proximal occlusion of left subclavian vein s/p in-stent restenosis where she was switched from coumadin to lovenox now presented to ED for diffuse ecchymosis and multiple nodular masses in all 4 extremities. Admitted for heparin coumadin bridging and management and work up of nodular abscesses.    # b/l LE and UE hematoma  # h/o SLE, APLA syndrome,  VTE and PE  recent hospitalization at [place] for upper extremity swelling and was diagnosed with subclavian vein in stent stenosis  lovenox d/c'ed- continue anticoagulation- with heparin IV- for bridging to coumadin with higher INR target 3-4  monitor PTT, PT; PTT- at goal; continue heparin at 6U/hr; INR- 2.56 give 7.5 mg coumadin tonight. Monitor Hgb.    # nausea and vomiting episode- improving;   - possibly related to gastritis from [facility]  zofran IV PRN  -start on protonix     #  cellulitis  continue doxycycline- changed back to IV    #h/o long-QT syndrome and VT s/p AICD  #HFpEF    #cysitis   UCx- <49K E colil; Sn- bactrim  patient c/o  dysuria  started on nitrofurantoin on 10/21- patient unable to tolerate- changed to bactrim on 10/22    #Hypomagnesemia- replace 2 g daily for 2 days; monitor    #COPD  - inhalers and nebs for COPD  - CXR no acute cardiopulmonary pathologies    #Chronic pain syndrome  - MS contin 60 q6hrs   - start PRN dilaudid for break thorugh pain    #Anxiety  - Xanax 2mg TID PRN    Appointment at coumadin clinic for Wednesday, Nov 2nd at 1 pm    Pending: attaining coumadin therapeutic level  Dispo: home

## 2022-10-26 LAB
ALBUMIN SERPL ELPH-MCNC: 3.9 G/DL — SIGNIFICANT CHANGE UP (ref 3.5–5.2)
ALBUMIN SERPL ELPH-MCNC: 4.6 G/DL — SIGNIFICANT CHANGE UP (ref 3.5–5.2)
ALP SERPL-CCNC: 64 U/L — SIGNIFICANT CHANGE UP (ref 30–115)
ALP SERPL-CCNC: 73 U/L — SIGNIFICANT CHANGE UP (ref 30–115)
ALT FLD-CCNC: 14 U/L — SIGNIFICANT CHANGE UP (ref 0–41)
ALT FLD-CCNC: 16 U/L — SIGNIFICANT CHANGE UP (ref 0–41)
ANION GAP SERPL CALC-SCNC: 16 MMOL/L — HIGH (ref 7–14)
ANION GAP SERPL CALC-SCNC: 9 MMOL/L — SIGNIFICANT CHANGE UP (ref 7–14)
APPEARANCE UR: CLEAR — SIGNIFICANT CHANGE UP
APTT BLD: 96.8 SEC — CRITICAL HIGH (ref 27–39.2)
AST SERPL-CCNC: 15 U/L — SIGNIFICANT CHANGE UP (ref 0–41)
AST SERPL-CCNC: 17 U/L — SIGNIFICANT CHANGE UP (ref 0–41)
BACTERIA # UR AUTO: NEGATIVE — SIGNIFICANT CHANGE UP
BILIRUB SERPL-MCNC: 0.2 MG/DL — SIGNIFICANT CHANGE UP (ref 0.2–1.2)
BILIRUB SERPL-MCNC: 0.3 MG/DL — SIGNIFICANT CHANGE UP (ref 0.2–1.2)
BILIRUB UR-MCNC: NEGATIVE — SIGNIFICANT CHANGE UP
BUN SERPL-MCNC: 17 MG/DL — SIGNIFICANT CHANGE UP (ref 10–20)
BUN SERPL-MCNC: 19 MG/DL — SIGNIFICANT CHANGE UP (ref 10–20)
CALCIUM SERPL-MCNC: 10.2 MG/DL — SIGNIFICANT CHANGE UP (ref 8.4–10.5)
CALCIUM SERPL-MCNC: 9.7 MG/DL — SIGNIFICANT CHANGE UP (ref 8.4–10.4)
CHLORIDE SERPL-SCNC: 101 MMOL/L — SIGNIFICANT CHANGE UP (ref 98–110)
CHLORIDE SERPL-SCNC: 99 MMOL/L — SIGNIFICANT CHANGE UP (ref 98–110)
CO2 SERPL-SCNC: 26 MMOL/L — SIGNIFICANT CHANGE UP (ref 17–32)
CO2 SERPL-SCNC: 27 MMOL/L — SIGNIFICANT CHANGE UP (ref 17–32)
COLOR SPEC: SIGNIFICANT CHANGE UP
CREAT SERPL-MCNC: 0.9 MG/DL — SIGNIFICANT CHANGE UP (ref 0.7–1.5)
CREAT SERPL-MCNC: 0.9 MG/DL — SIGNIFICANT CHANGE UP (ref 0.7–1.5)
DIFF PNL FLD: NEGATIVE — SIGNIFICANT CHANGE UP
EGFR: 77 ML/MIN/1.73M2 — SIGNIFICANT CHANGE UP
EGFR: 77 ML/MIN/1.73M2 — SIGNIFICANT CHANGE UP
EPI CELLS # UR: 9 /HPF — HIGH (ref 0–5)
GLUCOSE SERPL-MCNC: 93 MG/DL — SIGNIFICANT CHANGE UP (ref 70–99)
GLUCOSE SERPL-MCNC: 99 MG/DL — SIGNIFICANT CHANGE UP (ref 70–99)
GLUCOSE UR QL: NEGATIVE — SIGNIFICANT CHANGE UP
HCT VFR BLD CALC: 33.1 % — LOW (ref 37–47)
HGB BLD-MCNC: 11.2 G/DL — LOW (ref 12–16)
HYALINE CASTS # UR AUTO: 0 /LPF — SIGNIFICANT CHANGE UP (ref 0–7)
INR BLD: 3.41 RATIO — HIGH (ref 0.65–1.3)
KETONES UR-MCNC: NEGATIVE — SIGNIFICANT CHANGE UP
LEUKOCYTE ESTERASE UR-ACNC: ABNORMAL
MAGNESIUM SERPL-MCNC: 1.5 MG/DL — LOW (ref 1.8–2.4)
MAGNESIUM SERPL-MCNC: 1.7 MG/DL — LOW (ref 1.8–2.4)
MCHC RBC-ENTMCNC: 29.4 PG — SIGNIFICANT CHANGE UP (ref 27–31)
MCHC RBC-ENTMCNC: 33.8 G/DL — SIGNIFICANT CHANGE UP (ref 32–37)
MCV RBC AUTO: 86.9 FL — SIGNIFICANT CHANGE UP (ref 81–99)
NITRITE UR-MCNC: NEGATIVE — SIGNIFICANT CHANGE UP
NRBC # BLD: 0 /100 WBCS — SIGNIFICANT CHANGE UP (ref 0–0)
PH UR: 7 — SIGNIFICANT CHANGE UP (ref 5–8)
PLATELET # BLD AUTO: 189 K/UL — SIGNIFICANT CHANGE UP (ref 130–400)
POTASSIUM SERPL-MCNC: 4.3 MMOL/L — SIGNIFICANT CHANGE UP (ref 3.5–5)
POTASSIUM SERPL-MCNC: 4.7 MMOL/L — SIGNIFICANT CHANGE UP (ref 3.5–5)
POTASSIUM SERPL-SCNC: 4.3 MMOL/L — SIGNIFICANT CHANGE UP (ref 3.5–5)
POTASSIUM SERPL-SCNC: 4.7 MMOL/L — SIGNIFICANT CHANGE UP (ref 3.5–5)
PROT SERPL-MCNC: 6.3 G/DL — SIGNIFICANT CHANGE UP (ref 6–8)
PROT SERPL-MCNC: 7.4 G/DL — SIGNIFICANT CHANGE UP (ref 6–8)
PROT UR-MCNC: NEGATIVE — SIGNIFICANT CHANGE UP
PROTHROM AB SERPL-ACNC: >40 SEC — HIGH (ref 9.95–12.87)
RBC # BLD: 3.81 M/UL — LOW (ref 4.2–5.4)
RBC # FLD: 13.6 % — SIGNIFICANT CHANGE UP (ref 11.5–14.5)
RBC CASTS # UR COMP ASSIST: 16 /HPF — HIGH (ref 0–4)
SODIUM SERPL-SCNC: 137 MMOL/L — SIGNIFICANT CHANGE UP (ref 135–146)
SODIUM SERPL-SCNC: 141 MMOL/L — SIGNIFICANT CHANGE UP (ref 135–146)
SP GR SPEC: 1.02 — SIGNIFICANT CHANGE UP (ref 1.01–1.03)
UROBILINOGEN FLD QL: SIGNIFICANT CHANGE UP
WBC # BLD: 6.69 K/UL — SIGNIFICANT CHANGE UP (ref 4.8–10.8)
WBC # FLD AUTO: 6.69 K/UL — SIGNIFICANT CHANGE UP (ref 4.8–10.8)
WBC UR QL: 3 /HPF — SIGNIFICANT CHANGE UP (ref 0–5)

## 2022-10-26 PROCEDURE — 99232 SBSQ HOSP IP/OBS MODERATE 35: CPT | Mod: GC

## 2022-10-26 RX ORDER — WARFARIN SODIUM 2.5 MG/1
2.5 TABLET ORAL ONCE
Refills: 0 | Status: COMPLETED | OUTPATIENT
Start: 2022-10-26 | End: 2022-10-26

## 2022-10-26 RX ORDER — ALPRAZOLAM 0.25 MG
2 TABLET ORAL THREE TIMES A DAY
Refills: 0 | Status: DISCONTINUED | OUTPATIENT
Start: 2022-10-26 | End: 2022-10-27

## 2022-10-26 RX ADMIN — Medication 50 MILLIGRAM(S): at 05:39

## 2022-10-26 RX ADMIN — MORPHINE SULFATE 120 MILLIGRAM(S): 50 CAPSULE, EXTENDED RELEASE ORAL at 21:42

## 2022-10-26 RX ADMIN — MORPHINE SULFATE 120 MILLIGRAM(S): 50 CAPSULE, EXTENDED RELEASE ORAL at 05:43

## 2022-10-26 RX ADMIN — Medication 2 MILLIGRAM(S): at 06:56

## 2022-10-26 RX ADMIN — MORPHINE SULFATE 120 MILLIGRAM(S): 50 CAPSULE, EXTENDED RELEASE ORAL at 06:13

## 2022-10-26 RX ADMIN — Medication 110 MILLIGRAM(S): at 17:42

## 2022-10-26 RX ADMIN — SENNA PLUS 2 TABLET(S): 8.6 TABLET ORAL at 21:35

## 2022-10-26 RX ADMIN — WARFARIN SODIUM 2.5 MILLIGRAM(S): 2.5 TABLET ORAL at 21:36

## 2022-10-26 RX ADMIN — Medication 50 MILLIGRAM(S): at 17:41

## 2022-10-26 RX ADMIN — Medication 2 MILLIGRAM(S): at 21:41

## 2022-10-26 RX ADMIN — PANTOPRAZOLE SODIUM 40 MILLIGRAM(S): 20 TABLET, DELAYED RELEASE ORAL at 11:26

## 2022-10-26 RX ADMIN — Medication 100 MILLIGRAM(S): at 05:39

## 2022-10-26 RX ADMIN — Medication 81 MILLIGRAM(S): at 11:28

## 2022-10-26 RX ADMIN — Medication 110 MILLIGRAM(S): at 05:38

## 2022-10-26 RX ADMIN — MORPHINE SULFATE 120 MILLIGRAM(S): 50 CAPSULE, EXTENDED RELEASE ORAL at 21:35

## 2022-10-26 RX ADMIN — HYDROMORPHONE HYDROCHLORIDE 2 MILLIGRAM(S): 2 INJECTION INTRAMUSCULAR; INTRAVENOUS; SUBCUTANEOUS at 11:30

## 2022-10-26 RX ADMIN — MORPHINE SULFATE 120 MILLIGRAM(S): 50 CAPSULE, EXTENDED RELEASE ORAL at 14:11

## 2022-10-26 NOTE — PROGRESS NOTE ADULT - ASSESSMENT
50 y/o F w/ PMHx SLE, lupus anticoagulant, antiphospholipid syndrome (follows Dr. Sultana), rheumatoid arthritis, cervical cancer s/p hysterectomy, laryngeal cancer s/p multiple surgical scrapings of the vocal cords and lymphadenectomies and radiotherapy, h/o of Torsades de clint s/p COMA (17 days) w/ CHF s/p AICD+PPM in 2003, h/o DVT/PE, NSTEMI in 2015, subclavian stenosis s/p stent placement, HTN, HLD and recent admission to [facility] from 09/22-10/09 for proximal occlusion of left subclavian vein s/p in-stent restenosis where she was switched from coumadin to lovenox now presented to ED for diffuse ecchymosis and multiple nodular masses in all 4 extremities. Admitted for heparin coumadin bridging and management and work up of nodular abscesses.    # b/l LE and UE hematoma  # h/o SLE, APLA syndrome,  VTE and PE  recent hospitalization at [facility] for upper extremity swelling and was diagnosed with subclavian vein in stent stenosis  lovenox d/c'ed- continue anticoagulation- with heparin IV- for bridging to coumadin with higher INR target 3-4  monitor PTT, PT; PTT- at goal; DC heparin at 6U/hr; INR- 3.41. 2.5 mg coumadin tonight. Monitor Hgb.    # nausea and vomiting episode- improving;   - possibly related to gastritis from [employer]  zofran IV PRN  -start on protonix     #  cellulitis  continue doxycycline- changed back to IV    #h/o long-QT syndrome and VT s/p AICD  #HFpEF    #cysitis   UCx- <49K E colil; Sn- bactrim  patient c/o  dysuria  started on nitrofurantoin on 10/21- patient unable to tolerate- changed to bactrim on 10/22    #Hypomagnesemia- replace 2 g daily for 2 days; monitor    #COPD  - inhalers and nebs for COPD  - CXR no acute cardiopulmonary pathologies    #Chronic pain syndrome  - MS contin 60 q6hrs   - start PRN dilaudid for break thorugh pain    #Anxiety  - Xanax 2mg TID PRN    Appointment at coumadin clinic for Wednesday, Nov 2nd at 1 pm    Pending: attaining coumadin therapeutic level  Dispo: home   52 y/o F w/ PMHx SLE, lupus anticoagulant, antiphospholipid syndrome (follows Dr. Sultana), rheumatoid arthritis, cervical cancer s/p hysterectomy, laryngeal cancer s/p multiple surgical scrapings of the vocal cords and lymphadenectomies and radiotherapy, h/o of Torsades de clint s/p COMA (17 days) w/ CHF s/p AICD+PPM in 2003, h/o DVT/PE, NSTEMI in 2015, subclavian stenosis s/p stent placement, HTN, HLD and recent admission to [facility] from 09/22-10/09 for proximal occlusion of left subclavian vein s/p in-stent restenosis where she was switched from coumadin to lovenox now presented to ED for diffuse ecchymosis and multiple nodular masses in all 4 extremities. Admitted for heparin coumadin bridging and management and work up of nodular abscesses.    # b/l LE and UE hematoma  # h/o SLE, APLA syndrome,  VTE and PE  recent hospitalization at [place] for upper extremity swelling and was diagnosed with subclavian vein in stent stenosis  lovenox d/c'ed- continue anticoagulation- with heparin IV- for bridging to coumadin with higher INR target 3-4  monitor PTT, PT; PTT- at goal; DC heparin at 6U/hr; INR- 3.41. 2.5 mg coumadin tonight. Monitor Hgb.    # nausea and vomiting episode- improving;   - possibly related to gastritis from [employer]  zofran IV PRN  -start on protonix     #  cellulitis  - continue doxycycline- changed back to IV. Last day on Nov 1st. If leaves before can DC on po Doxy 100 mg q12h    #h/o long-QT syndrome and VT s/p AICD  #HFpEF    #cysitis   UCx- <49K E colil; Sn- bactrim  patient c/o  dysuria  started on nitrofurantoin on 10/21- patient unable to tolerate- changed to bactrim on 10/22    #Hypomagnesemia- replace 2 g daily for 2 days; monitor    #COPD  - inhalers and nebs for COPD  - CXR no acute cardiopulmonary pathologies    #Chronic pain syndrome  - MS contin 60 q6hrs   - start PRN dilaudid for break thorugh pain    #Anxiety  - Xanax 2mg TID PRN    Appointment at coumadin clinic for Wednesday, Nov 2nd at 1 pm    Pending: attaining coumadin therapeutic level  Dispo: home

## 2022-10-26 NOTE — PROGRESS NOTE ADULT - ATTENDING COMMENTS
52 y/o F w/ PMHx SLE, lupus anticoagulant, antiphospholipid syndrome (follows Dr. Sultana), rheumatoid arthritis, cervical cancer s/p hysterectomy, laryngeal cancer s/p multiple surgical scrapings of the vocal cords and lymphadenectomies and radiotherapy, h/o of Torsades de clint s/p COMA (17 days) w/ CHF s/p AICD+PPM in 2003, h/o DVT/PE, NSTEMI in 2015, subclavian stenosis s/p stent placement, HTN, HLD and recent admission to [facility] from 09/22-10/09 for proximal occlusion of left subclavian vein s/p in-stent restenosis where she was switched from coumadin to lovenox now presented to ED for diffuse ecchymosis and multiple nodular masses in all 4 extremities. Admitted for heparin coumadin bridging and management and work up of nodular abscesses.    Progress Note Handoff:  Pending: attaining coumadin therapeutic level; discharge plan with PO antibitoics  Plan of care d/w patient at bedside.   Dispo: home

## 2022-10-27 ENCOUNTER — TRANSCRIPTION ENCOUNTER (OUTPATIENT)
Age: 52
End: 2022-10-27

## 2022-10-27 VITALS
TEMPERATURE: 97 F | HEART RATE: 72 BPM | SYSTOLIC BLOOD PRESSURE: 112 MMHG | DIASTOLIC BLOOD PRESSURE: 52 MMHG | RESPIRATION RATE: 18 BRPM

## 2022-10-27 LAB
ALBUMIN SERPL ELPH-MCNC: 4.6 G/DL — SIGNIFICANT CHANGE UP (ref 3.5–5.2)
ALP SERPL-CCNC: 66 U/L — SIGNIFICANT CHANGE UP (ref 30–115)
ALT FLD-CCNC: 15 U/L — SIGNIFICANT CHANGE UP (ref 0–41)
ANION GAP SERPL CALC-SCNC: 9 MMOL/L — SIGNIFICANT CHANGE UP (ref 7–14)
AST SERPL-CCNC: 16 U/L — SIGNIFICANT CHANGE UP (ref 0–41)
BILIRUB SERPL-MCNC: 0.3 MG/DL — SIGNIFICANT CHANGE UP (ref 0.2–1.2)
BUN SERPL-MCNC: 18 MG/DL — SIGNIFICANT CHANGE UP (ref 10–20)
CALCIUM SERPL-MCNC: 9.7 MG/DL — SIGNIFICANT CHANGE UP (ref 8.4–10.5)
CHLORIDE SERPL-SCNC: 96 MMOL/L — LOW (ref 98–110)
CO2 SERPL-SCNC: 30 MMOL/L — SIGNIFICANT CHANGE UP (ref 17–32)
CREAT SERPL-MCNC: 0.8 MG/DL — SIGNIFICANT CHANGE UP (ref 0.7–1.5)
EGFR: 89 ML/MIN/1.73M2 — SIGNIFICANT CHANGE UP
GLUCOSE SERPL-MCNC: 95 MG/DL — SIGNIFICANT CHANGE UP (ref 70–99)
INR BLD: 3.05 RATIO — HIGH (ref 0.65–1.3)
MAGNESIUM SERPL-MCNC: 1.5 MG/DL — LOW (ref 1.8–2.4)
POTASSIUM SERPL-MCNC: 4.5 MMOL/L — SIGNIFICANT CHANGE UP (ref 3.5–5)
POTASSIUM SERPL-SCNC: 4.5 MMOL/L — SIGNIFICANT CHANGE UP (ref 3.5–5)
PROT SERPL-MCNC: 7 G/DL — SIGNIFICANT CHANGE UP (ref 6–8)
PROTHROM AB SERPL-ACNC: 36 SEC — HIGH (ref 9.95–12.87)
SODIUM SERPL-SCNC: 135 MMOL/L — SIGNIFICANT CHANGE UP (ref 135–146)

## 2022-10-27 PROCEDURE — 99233 SBSQ HOSP IP/OBS HIGH 50: CPT

## 2022-10-27 RX ORDER — MORPHINE SULFATE 50 MG/1
120 CAPSULE, EXTENDED RELEASE ORAL THREE TIMES A DAY
Refills: 0 | Status: DISCONTINUED | OUTPATIENT
Start: 2022-10-27 | End: 2022-10-27

## 2022-10-27 RX ORDER — WARFARIN SODIUM 2.5 MG/1
2 TABLET ORAL
Qty: 30 | Refills: 0
Start: 2022-10-27 | End: 2022-11-10

## 2022-10-27 RX ORDER — METHYLPREDNISOLONE 4 MG
1 TABLET ORAL
Qty: 30 | Refills: 0
Start: 2022-10-27 | End: 2022-11-25

## 2022-10-27 RX ORDER — WARFARIN SODIUM 2.5 MG/1
2 TABLET ORAL
Qty: 14 | Refills: 0
Start: 2022-10-27 | End: 2022-11-02

## 2022-10-27 RX ORDER — MAGNESIUM SULFATE 500 MG/ML
2 VIAL (ML) INJECTION ONCE
Refills: 0 | Status: COMPLETED | OUTPATIENT
Start: 2022-10-27 | End: 2022-10-27

## 2022-10-27 RX ADMIN — Medication 100 MILLIGRAM(S): at 06:12

## 2022-10-27 RX ADMIN — HYDROMORPHONE HYDROCHLORIDE 2 MILLIGRAM(S): 2 INJECTION INTRAMUSCULAR; INTRAVENOUS; SUBCUTANEOUS at 06:36

## 2022-10-27 RX ADMIN — MORPHINE SULFATE 120 MILLIGRAM(S): 50 CAPSULE, EXTENDED RELEASE ORAL at 14:15

## 2022-10-27 RX ADMIN — Medication 2 MILLIGRAM(S): at 06:59

## 2022-10-27 RX ADMIN — Medication 81 MILLIGRAM(S): at 13:13

## 2022-10-27 RX ADMIN — POLYETHYLENE GLYCOL 3350 17 GRAM(S): 17 POWDER, FOR SOLUTION ORAL at 13:13

## 2022-10-27 RX ADMIN — Medication 25 GRAM(S): at 14:03

## 2022-10-27 RX ADMIN — PANTOPRAZOLE SODIUM 40 MILLIGRAM(S): 20 TABLET, DELAYED RELEASE ORAL at 13:14

## 2022-10-27 RX ADMIN — MORPHINE SULFATE 120 MILLIGRAM(S): 50 CAPSULE, EXTENDED RELEASE ORAL at 07:45

## 2022-10-27 RX ADMIN — Medication 110 MILLIGRAM(S): at 05:56

## 2022-10-27 RX ADMIN — HYDROMORPHONE HYDROCHLORIDE 2 MILLIGRAM(S): 2 INJECTION INTRAMUSCULAR; INTRAVENOUS; SUBCUTANEOUS at 06:03

## 2022-10-27 NOTE — DISCHARGE NOTE PROVIDER - HOSPITAL COURSE
51 year old woman with PMH of SLE  lupus with lupus anticoagulant, antiphospholipid syndrome,  rheumatoid arthritis, cervical cancer sp hysterectomy,  laryngeal cancer sp CT, RT, CHF s/p AICD+PPM, DVT/PE,  NSTEMI in 2015, subclavian stenosis s/p stent placement, HTN, HLD, PPM/AICD, , and recent admission from 09/22-10/09 for proximal occlusion of left subclavian vein status post in-stent restenosis presenting to ER for evaluation.  Patient states was recently switched from Coumadin to Lovenox during Recent Admission and States Is Now Having Painful Diffuse Ecchymosis.  Patient Has Been Following up with hem/onc Dr. Sultana Who Recommended Admission for Bridging to Coumadin.  Patient Also Complaining of Hematuria for the past 6 Days and burning sensation when she urinates.  Furthermore reports painful swelling and redness in left lower extremity at a previous ecchymotic area.     ED course: /67, HR 71, RR 20, T 97.8, SpO2 99% on RA. CBC and CMP were unremarkable. UA positive for LE, small amount of blood, epithelial cells, few bacteria. No pyuria. CT abdomen performed -No evidence of acute intra-abdominal pathology.    Patient was admitted for Cellulitis on top of ecchymosis. She was treated with doxycycline as well as TMP/SMX for cystitis.     Patient was bridged from heparin to coumadin with a goal INR of 3-4. Goal was achieved, heparin and heparin was stopped.

## 2022-10-27 NOTE — DISCHARGE NOTE NURSING/CASE MANAGEMENT/SOCIAL WORK - PATIENT PORTAL LINK FT
You can access the FollowMyHealth Patient Portal offered by [facility] by registering at the following website: [email or web address]. By joining [facility]’s FollowMyHealth portal, you will also be able to view your health information using other applications (apps) compatible with our system.

## 2022-10-27 NOTE — DISCHARGE NOTE PROVIDER - NSDCMRMEDTOKEN_GEN_ALL_CORE_FT
albuterol 90 mcg/inh inhalation aerosol: 2 puff(s) inhaled every 6 hours, As Needed  ALPRAZolam 1 mg oral tablet: 2 tab(s) orally 3 times a day, As Needed  aspirin 81 mg oral delayed release tablet: 1 tab(s) orally once a day  cyanocobalamin 1000 mcg/mL injectable solution: 1 milliliter(s) injectable once a week  doxycycline hyclate 100 mg oral tablet: 1 tab(s) orally 2 times a day   folic acid 1 mg oral tablet: 1 tab(s) orally once a day  hydrALAZINE 50 mg oral tablet: 1 tab(s) orally 2 times a day  Metoprolol Succinate  mg oral tablet, extended release: 1 tab(s) orally once a day  MS Contin 60 mg oral tablet, extended release: 2 tab(s) orally 3 times a day (after meals), As Needed  Trelegy Ellipta 100 mcg-62.5 mcg-25 mcg/inh inhalation powder: 1 puff(s) inhaled once a day  warfarin 2.5 mg oral tablet: 2 tab(s) orally once a day    albuterol 90 mcg/inh inhalation aerosol: 2 puff(s) inhaled every 6 hours, As Needed  ALPRAZolam 1 mg oral tablet: 2 tab(s) orally 3 times a day, As Needed  aspirin 81 mg oral delayed release tablet: 1 tab(s) orally once a day  cyanocobalamin 1000 mcg/mL injectable solution: 1 milliliter(s) injectable once a week  doxycycline hyclate 100 mg oral tablet: 1 tab(s) orally 2 times a day   folic acid 1 mg oral tablet: 1 tab(s) orally once a day  hydrALAZINE 50 mg oral tablet: 1 tab(s) orally 2 times a day  magnesium gluconate 500 mg oral tablet: 1 tab(s) orally once a day   Metoprolol Succinate  mg oral tablet, extended release: 1 tab(s) orally once a day  MS Contin 60 mg oral tablet, extended release: 2 tab(s) orally 3 times a day (after meals), As Needed  Trelegy Ellipta 100 mcg-62.5 mcg-25 mcg/inh inhalation powder: 1 puff(s) inhaled once a day  warfarin 2.5 mg oral tablet: 2 tab(s) orally once a day

## 2022-10-27 NOTE — PROGRESS NOTE ADULT - PROVIDER SPECIALTY LIST ADULT
Internal Medicine
Hospitalist
Internal Medicine
Hospitalist
Internal Medicine
Internal Medicine

## 2022-10-27 NOTE — DISCHARGE NOTE PROVIDER - NSDCCPCAREPLAN_GEN_ALL_CORE_FT
PRINCIPAL DISCHARGE DIAGNOSIS  Diagnosis: Cellulitis  Assessment and Plan of Treatment: You had cellulitis and urinary tract infection which was treated with antibiotics. You had clots which was treated with coumadin. You will have to take couymadin everyday and you will have to go to a coumadin clinic appointment on Wednesday Nov 2nd.      SECONDARY DISCHARGE DIAGNOSES  Diagnosis: Hematuria  Assessment and Plan of Treatment:     Diagnosis: Ecchymosis  Assessment and Plan of Treatment:      PRINCIPAL DISCHARGE DIAGNOSIS  Diagnosis: Cellulitis  Assessment and Plan of Treatment: You had cellulitis and urinary tract infection which was treated with antibiotics. You had clots which was treated with coumadin. You will have to take couymadin everyday and you will have to go to a coumadin clinic appointment on Wednesday Nov 2nd.  Please continue with the doxycycline pill, make sure you drink a full glass of water and be upright for one hour after taking it as it can irritate your stomach if you dont.  As we discussed please follow up with your hematologist on Monday and continue with the coumadin.      SECONDARY DISCHARGE DIAGNOSES  Diagnosis: Hematuria  Assessment and Plan of Treatment:     Diagnosis: Ecchymosis  Assessment and Plan of Treatment:      PRINCIPAL DISCHARGE DIAGNOSIS  Diagnosis: Cellulitis  Assessment and Plan of Treatment: You had cellulitis and urinary tract infection which was treated with antibiotics. You had clots which was treated with coumadin. You will have to take couymadin everyday and you will have to go to a coumadin clinic appointment on Wednesday Nov 2nd.  Please continue with the doxycycline pill, make sure you drink a full glass of water and be upright for one hour after taking it as it can irritate your stomach if you dont.  As we discussed please follow up with your hematologist on Monday and continue with the coumadin.  Please also follow up with your PCP and confirm your continuing coumadin dose (with the coumadin clinic) for an INR of 3-4 as recommended by the heme onc team.      SECONDARY DISCHARGE DIAGNOSES  Diagnosis: Hematuria  Assessment and Plan of Treatment:     Diagnosis: Ecchymosis  Assessment and Plan of Treatment:

## 2022-10-27 NOTE — PROGRESS NOTE ADULT - ASSESSMENT
52 y/o F w/ PMHx SLE, lupus anticoagulant, antiphospholipid syndrome (follows Dr. Sultana), rheumatoid arthritis, cervical cancer s/p hysterectomy, laryngeal cancer s/p multiple surgical scrapings of the vocal cords and lymphadenectomies and radiotherapy, h/o of Torsades de clint s/p COMA (17 days) w/ CHF s/p AICD+PPM in 2003, h/o DVT/PE, NSTEMI in 2015, subclavian stenosis s/p stent placement, HTN, HLD and recent admission to [facility] from 09/22-10/09 for proximal occlusion of left subclavian vein s/p in-stent restenosis where she was switched from coumadin to lovenox now presented to ED for diffuse ecchymosis and multiple nodular masses in all 4 extremities. Admitted for heparin coumadin bridging and management and work up of nodular abscesses.    # b/l LE and UE hematoma  # h/o SLE, APLA syndrome,  VTE and PE  - recent hospitalization at [place] for upper extremity swelling and was diagnosed with subclavian vein in stent stenosis  - seen by heme onc here recommending bridging to coumadin which is completed for inr 3-4, and will follow up with specialist in [place]  - team confirmed with pharmacy that coumadin 5 at night standing is a safe dose for INR 3-4, will follow up with heme onc on Monday    # nausea and vomiting episode- improving;   - possibly related to gastritis from [employer]  zofran IV PRN  -start on protonix    #  cellulitis  - continue doxycycline last day on Nov 1st will discharge on pill    #h/o long-QT syndrome and VT s/p AICD  #HFpEF    #cysitis   UCx- <49K E colil; Sn- bactrim  patient c/o  dysuria  started on nitrofurantoin on 10/21- patient unable to tolerate- changed to bactrim on 10/22    #Hypomagnesemia- replace 2 g daily for 2 days; monitor    #COPD  - inhalers and nebs for COPD  - CXR no acute cardiopulmonary pathologies    #Chronic pain syndrome  - MS contin 60 q6hrs     #Anxiety  - Xanax 2mg TID PRN    Appointment at coumadin clinic for Wednesday, Nov 2nd at 1 pm    Dispo: home, patient is on coumadin 5 daily for now, will see heme onc on Monday

## 2022-10-27 NOTE — PROGRESS NOTE ADULT - SUBJECTIVE AND OBJECTIVE BOX
HPI  Patient is a 52y old Female who presents with a chief complaint of Multiple complaints (21 Oct 2022 10:09)    Currently admitted to medicine with the primary diagnosis of Cellulitis       Today is hospital day 3d.     INTERVAL HPI / OVERNIGHT EVENTS:  Patient was seen and examined at bedside  feels she has nodule in left index finger area  states she has some burning with urination; also had some lower abdominal discomfort with hematuria when she presented        PAST MEDICAL & SURGICAL HISTORY  Lupus    RA (rheumatoid arthritis)    HTN (hypertension)    CHF (congestive heart failure)    COPD (chronic obstructive pulmonary disease)    DVT (deep venous thrombosis)    Pulmonary embolism    History of laryngeal cancer    GERD (gastroesophageal reflux disease)    Cervical cancer    S/P appendectomy    S/P cholecystectomy    AICD (automatic cardioverter/defibrillator) present    S/P hysterectomy    History of back surgery    H/O foot surgery    S/P eye surgery    Subclavian arterial stenosis      ALLERGIES  adhesives (Rash; Urticaria)  Arava (Anaphylaxis; Angioedema)  Avelox (Other)  Plaquenil (Other)  Vantin (Other (Mild))    MEDICATIONS  STANDING MEDICATIONS  aspirin enteric coated 81 milliGRAM(s) Oral daily  budesonide  80 MICROgram(s)/formoterol 4.5 MICROgram(s) Inhaler 2 Puff(s) Inhalation two times a day  doxycycline monohydrate Capsule 100 milliGRAM(s) Oral every 12 hours  heparin  Infusion. 1200 Unit(s)/Hr IV Continuous <Continuous>  hydrALAZINE 50 milliGRAM(s) Oral two times a day  metoprolol succinate  milliGRAM(s) Oral daily  morphine ER Tablet 120 milliGRAM(s) Oral three times a day  polyethylene glycol 3350 17 Gram(s) Oral daily  senna 2 Tablet(s) Oral at bedtime  warfarin 7.5 milliGRAM(s) Oral once    PRN MEDICATIONS  ALBUTerol    90 MICROgram(s) HFA Inhaler 2 Puff(s) Inhalation every 6 hours PRN  ALPRAZolam 2 milliGRAM(s) Oral three times a day PRN  heparin   Injectable 5500 Unit(s) IV Push every 6 hours PRN  heparin   Injectable 2500 Unit(s) IV Push every 6 hours PRN    VITALS:  T(F): 97.2  HR: 62  BP: 106/54  RR: 18  SpO2: --    PHYSICAL EXAM  GEN: no distress, comfortable  PULM: normal respiration  EXT: multiple area of bruises  NEURO: A&Ox3, moving all extremities    LABS                        13.2   8.77  )-----------( 228      ( 21 Oct 2022 07:45 )             39.3     10-21    141  |  104  |  11  ----------------------------<  106<H>  4.0   |  26  |  0.7    Ca    10.2      21 Oct 2022 07:45  Mg     1.9     10-21    TPro  7.4  /  Alb  4.8  /  TBili  0.5  /  DBili  x   /  AST  24  /  ALT  31  /  AlkPhos  76  10-21    PT/INR - ( 21 Oct 2022 07:45 )   PT: 11.40 sec;   INR: 1.00 ratio         PTT - ( 21 Oct 2022 11:00 )  PTT:133.7 sec          Culture - Urine (collected 18 Oct 2022 17:14)  Source: Clean Catch Clean Catch (Midstream)  Preliminary Report (20 Oct 2022 14:41):    10,000 - 49,000 CFU/mL Escherichia coli          RADIOLOGY    
Patient is a 52y old  Female who presents with a chief complaint of Multiple complaints (25 Oct 2022 12:35)      INTERVAL HPI/OVERNIGHT EVENTS:  None    FAMILY HISTORY:  Family history of cervical cancer    FH: thyroid cancer      T(C): 36.2 (10-26-22 @ 04:16), Max: 36.7 (10-25-22 @ 19:49)  HR: 66 (10-26-22 @ 04:16) (66 - 94)  BP: 134/60 (10-26-22 @ 04:16) (134/60 - 153/70)  RR: 18 (10-26-22 @ 04:16) (17 - 18)  SpO2: --  Wt(kg): --Vital Signs Last 24 Hrs  T(C): 36.2 (26 Oct 2022 04:16), Max: 36.7 (25 Oct 2022 19:49)  T(F): 97.1 (26 Oct 2022 04:16), Max: 98 (25 Oct 2022 19:49)  HR: 66 (26 Oct 2022 04:16) (66 - 94)  BP: 134/60 (26 Oct 2022 04:16) (134/60 - 153/70)  BP(mean): --  RR: 18 (26 Oct 2022 04:16) (17 - 18)  SpO2: --        PHYSICAL EXAM:  GENERAL: NAD, well-groomed, well-developed  HEAD:  Atraumatic, Normocephalic  EYES: EOMI, PERRLA, conjunctiva and sclera clear  ENMT: No tonsillar erythema, exudates, or enlargement; Moist mucous membranes, Good dentition, No lesions  NECK: Supple, No JVD, Normal thyroid  NERVOUS SYSTEM:  Alert & Oriented X3, Good concentration; Motor Strength 5/5 B/L upper and lower extremities; DTRs 2+ intact and symmetric  CHEST/LUNG: No rales, rhonchi, wheezing, or rubs  HEART: Regular rate and rhythm; No murmurs, rubs, or gallops  ABDOMEN: Soft, Nontender, Nondistended; Bowel sounds present  EXTREMITIES:  2+ Peripheral Pulses, No clubbing, cyanosis, or edema  LYMPH: No lymphadenopathy noted  SKIN: echymosis throughout      ALBUTerol    90 MICROgram(s) HFA Inhaler 2 Puff(s) Inhalation every 6 hours PRN  ALPRAZolam 2 milliGRAM(s) Oral three times a day PRN  aspirin enteric coated 81 milliGRAM(s) Oral daily  budesonide  80 MICROgram(s)/formoterol 4.5 MICROgram(s) Inhaler 2 Puff(s) Inhalation two times a day  doxycycline IVPB 100 milliGRAM(s) IV Intermittent every 12 hours  hydrALAZINE 50 milliGRAM(s) Oral two times a day  HYDROmorphone   Tablet 2 milliGRAM(s) Oral three times a day PRN  magnesium sulfate  IVPB 2 Gram(s) IV Intermittent once  metoprolol succinate  milliGRAM(s) Oral daily  morphine ER Tablet 120 milliGRAM(s) Oral three times a day  ondansetron Injectable 4 milliGRAM(s) IV Push every 6 hours PRN  pantoprazole  Injectable 40 milliGRAM(s) IV Push daily  polyethylene glycol 3350 17 Gram(s) Oral daily  senna 2 Tablet(s) Oral at bedtime  trimethoprim  160 mG/sulfamethoxazole 800 mG 1 Tablet(s) Oral two times a day  warfarin 2.5 milliGRAM(s) Oral once            
SUBJECTIVE:    Patient is a 52y old Female who presents with a chief complaint of Multiple complaints (27 Oct 2022 10:43)      HPI:  51 year old woman with PMH of SLE  lupus with lupus anticoagulant, antiphospholipid syndrome,  rheumatoid arthritis, cervical cancer sp hysterectomy,  laryngeal cancer sp CT, RT, CHF s/p AICD+PPM, DVT/PE,  NSTEMI in 2015, subclavian stenosis s/p stent placement, HTN, HLD, PPM/AICD, , and recent admission from -10/09 for proximal occlusion of left subclavian vein status post in-stent restenosis presenting to ER for evaluation.  Patient states was recently switched from Coumadin to Lovenox during Recent Admission and States Is Now Having Painful Diffuse Ecchymosis.  Patient Has Been Following up with hem/onc Dr. Sultana Who Recommended Admission for Bridging to Coumadin.  Patient Also Complaining of Hematuria for the past 6 Days and burning sensation when she urinates.  Furthermore reports painful swelling and redness in left lower extremity at a previous ecchymotic area.     ED course: /67, HR 71, RR 20, T 97.8, SpO2 99% on RA. CBC and CMP were unremarkable. UA positive for LE, small amount of blood, epithelial cells, few bacteria. No pyuria. CT abdomen performed -No evidence of acute intra-abdominal pathology.     (18 Oct 2022 22:06)      Currently admitted to medicine with the primary diagnosis of Cellulitis     wants to go home, pain is controlled    Besides the pertinent positives and negatives described above, the ROS was within normal limits.    PAST MEDICAL & SURGICAL HISTORY  Lupus    RA (rheumatoid arthritis)    HTN (hypertension)    CHF (congestive heart failure)    COPD (chronic obstructive pulmonary disease)    DVT (deep venous thrombosis)    Pulmonary embolism    History of laryngeal cancer    GERD (gastroesophageal reflux disease)    Cervical cancer    S/P appendectomy    S/P cholecystectomy    AICD (automatic cardioverter/defibrillator) present    S/P hysterectomy    History of back surgery    H/O foot surgery    S/P eye surgery    Subclavian arterial stenosis      SOCIAL HISTORY:    ALLERGIES:  adhesives (Rash; Urticaria)  Arava (Anaphylaxis; Angioedema)  Avelox (Other)  Plaquenil (Other)  Vantin (Other (Mild))    MEDICATIONS:  STANDING MEDICATIONS  aspirin enteric coated 81 milliGRAM(s) Oral daily  budesonide  80 MICROgram(s)/formoterol 4.5 MICROgram(s) Inhaler 2 Puff(s) Inhalation two times a day  doxycycline IVPB 100 milliGRAM(s) IV Intermittent every 12 hours  hydrALAZINE 50 milliGRAM(s) Oral two times a day  magnesium sulfate  IVPB 2 Gram(s) IV Intermittent once  metoprolol succinate  milliGRAM(s) Oral daily  morphine ER Tablet 120 milliGRAM(s) Oral three times a day  morphine ER Tablet 120 milliGRAM(s) Oral three times a day  pantoprazole  Injectable 40 milliGRAM(s) IV Push daily  polyethylene glycol 3350 17 Gram(s) Oral daily  senna 2 Tablet(s) Oral at bedtime  trimethoprim  160 mG/sulfamethoxazole 800 mG 1 Tablet(s) Oral two times a day    PRN MEDICATIONS  ALBUTerol    90 MICROgram(s) HFA Inhaler 2 Puff(s) Inhalation every 6 hours PRN  ALPRAZolam 2 milliGRAM(s) Oral three times a day PRN  HYDROmorphone   Tablet 2 milliGRAM(s) Oral three times a day PRN  ondansetron Injectable 4 milliGRAM(s) IV Push every 6 hours PRN    VITALS:   T(F): 96.7  HR: 72  BP: 112/52  RR: 18  SpO2: --    LABS:                        11.2   6.69  )-----------( 189      ( 26 Oct 2022 18:38 )             33.1     10-27    135  |  96<L>  |  18  ----------------------------<  95  4.5   |  30  |  0.8    Ca    9.7      27 Oct 2022 07:33  Mg     1.5     10-27    TPro  7.0  /  Alb  4.6  /  TBili  0.3  /  DBili  x   /  AST  16  /  ALT  15  /  AlkPhos  66  10-27    PT/INR - ( 27 Oct 2022 07:33 )   PT: 36.00 sec;   INR: 3.05 ratio         PTT - ( 26 Oct 2022 07:43 )  PTT:96.8 sec  Urinalysis Basic - ( 26 Oct 2022 13:30 )    Color: Light Yellow / Appearance: Clear / S.017 / pH: x  Gluc: x / Ketone: Negative  / Bili: Negative / Urobili: <2 mg/dL   Blood: x / Protein: Negative / Nitrite: Negative   Leuk Esterase: Small / RBC: 16 /HPF / WBC 3 /HPF   Sq Epi: x / Non Sq Epi: 9 /HPF / Bacteria: Negative                RADIOLOGY:    PHYSICAL EXAM:  General: WN/WD NAD  Neurology: A&Ox3, nonfocal, HUNTER x 4  Head:  Normocephalic, atraumatic  ENT:  Mucosa moist, no ulcerations  Neck:  Supple, no sinuses or palpable masses  Lymphatic:  No palpable cervical, supraclavicular, axillary or inguinal adenopathy  Respiratory: CTA B/L  CV: RRR, S1S2, no murmur  Abdominal: Soft, NT, ND no palpable mass  MSK: No edema  Incisions: intact, no erythema or drainage      Intravenous access: yes  NG tube: no  Rodarte Catheter: no       
HPI  Patient is a 52y old Female who presents with a chief complaint of Multiple complaints (25 Oct 2022 09:04)    Currently admitted to medicine with the primary diagnosis of Cellulitis       Today is hospital day 7d.     INTERVAL HPI / OVERNIGHT EVENTS:  Patient was seen and examined at bedside  c/o pain in the left leg - area of hematoma; states when she is standing up has more sharp pain  nausea is better      PAST MEDICAL & SURGICAL HISTORY  Lupus    RA (rheumatoid arthritis)    HTN (hypertension)    CHF (congestive heart failure)    COPD (chronic obstructive pulmonary disease)    DVT (deep venous thrombosis)    Pulmonary embolism    History of laryngeal cancer    GERD (gastroesophageal reflux disease)    Cervical cancer    S/P appendectomy    S/P cholecystectomy    AICD (automatic cardioverter/defibrillator) present    S/P hysterectomy    History of back surgery    H/O foot surgery    S/P eye surgery    Subclavian arterial stenosis      ALLERGIES  adhesives (Rash; Urticaria)  Arava (Anaphylaxis; Angioedema)  Avelox (Other)  Plaquenil (Other)  Vantin (Other (Mild))    MEDICATIONS  STANDING MEDICATIONS  aspirin enteric coated 81 milliGRAM(s) Oral daily  budesonide  80 MICROgram(s)/formoterol 4.5 MICROgram(s) Inhaler 2 Puff(s) Inhalation two times a day  doxycycline IVPB 100 milliGRAM(s) IV Intermittent every 12 hours  heparin  Infusion. 1200 Unit(s)/Hr IV Continuous <Continuous>  hydrALAZINE 50 milliGRAM(s) Oral two times a day  magnesium sulfate  IVPB 2 Gram(s) IV Intermittent once  metoprolol succinate  milliGRAM(s) Oral daily  morphine ER Tablet 120 milliGRAM(s) Oral three times a day  pantoprazole  Injectable 40 milliGRAM(s) IV Push daily  polyethylene glycol 3350 17 Gram(s) Oral daily  senna 2 Tablet(s) Oral at bedtime  trimethoprim  160 mG/sulfamethoxazole 800 mG 1 Tablet(s) Oral two times a day  warfarin 7.5 milliGRAM(s) Oral once    PRN MEDICATIONS  ALBUTerol    90 MICROgram(s) HFA Inhaler 2 Puff(s) Inhalation every 6 hours PRN  ALPRAZolam 2 milliGRAM(s) Oral three times a day PRN  heparin   Injectable 5500 Unit(s) IV Push every 6 hours PRN  heparin   Injectable 2500 Unit(s) IV Push every 6 hours PRN  HYDROmorphone   Tablet 2 milliGRAM(s) Oral three times a day PRN  ondansetron Injectable 4 milliGRAM(s) IV Push every 6 hours PRN    VITALS:  T(F): 97.4  HR: 94  BP: 146/68  RR: 17  SpO2: --    PHYSICAL EXAM  GEN: no distress, comfortable  PULM: normal respiration  EXT: left leag area of hematoma; small nodular swelling present'; area of redness has improved  NEURO: A&Ox3, moving all extremities    LABS                        12.3   8.18  )-----------( 219      ( 24 Oct 2022 06:34 )             36.6     10-24    138  |  101  |  19  ----------------------------<  90  4.3   |  24  |  0.9    Ca    9.5      24 Oct 2022 06:34  Mg     1.6     10-24    TPro  6.8  /  Alb  4.4  /  TBili  0.3  /  DBili  x   /  AST  15  /  ALT  17  /  AlkPhos  61  10-24    PT/INR - ( 25 Oct 2022 03:50 )   PT: 30.10 sec;   INR: 2.56 ratio         PTT - ( 25 Oct 2022 03:50 )  PTT:68.9 sec              RADIOLOGY    
HPI  Patient is a 52y old Female who presents with a chief complaint of Multiple complaints (21 Oct 2022 13:55)    Currently admitted to medicine with the primary diagnosis of Cellulitis       Today is hospital day 4d.     INTERVAL HPI / OVERNIGHT EVENTS:  Patient was seen and examined at bedside  c/o nausea/V after PO antibiotics yesterday  had episodes of vomiting  still has nausea  has abdominal discomfort      PAST MEDICAL & SURGICAL HISTORY  Lupus    RA (rheumatoid arthritis)    HTN (hypertension)    CHF (congestive heart failure)    COPD (chronic obstructive pulmonary disease)    DVT (deep venous thrombosis)    Pulmonary embolism    History of laryngeal cancer    GERD (gastroesophageal reflux disease)    Cervical cancer    S/P appendectomy    S/P cholecystectomy    AICD (automatic cardioverter/defibrillator) present    S/P hysterectomy    History of back surgery    H/O foot surgery    S/P eye surgery    Subclavian arterial stenosis      ALLERGIES  adhesives (Rash; Urticaria)  Arava (Anaphylaxis; Angioedema)  Avelox (Other)  Plaquenil (Other)  Vantin (Other (Mild))    MEDICATIONS  STANDING MEDICATIONS  aspirin enteric coated 81 milliGRAM(s) Oral daily  budesonide  80 MICROgram(s)/formoterol 4.5 MICROgram(s) Inhaler 2 Puff(s) Inhalation two times a day  doxycycline monohydrate Capsule 100 milliGRAM(s) Oral every 12 hours  heparin  Infusion. 1200 Unit(s)/Hr IV Continuous <Continuous>  hydrALAZINE 50 milliGRAM(s) Oral two times a day  magnesium oxide 400 milliGRAM(s) Oral three times a day with meals  metoprolol succinate  milliGRAM(s) Oral daily  morphine ER Tablet 120 milliGRAM(s) Oral three times a day  ondansetron   Disintegrating Tablet 4 milliGRAM(s) Oral every 6 hours  polyethylene glycol 3350 17 Gram(s) Oral daily  senna 2 Tablet(s) Oral at bedtime  trimethoprim  160 mG/sulfamethoxazole 800 mG 1 Tablet(s) Oral two times a day  warfarin 7.5 milliGRAM(s) Oral once    PRN MEDICATIONS  ALBUTerol    90 MICROgram(s) HFA Inhaler 2 Puff(s) Inhalation every 6 hours PRN  ALPRAZolam 2 milliGRAM(s) Oral three times a day PRN  heparin   Injectable 5500 Unit(s) IV Push every 6 hours PRN  heparin   Injectable 2500 Unit(s) IV Push every 6 hours PRN  HYDROmorphone   Tablet 2 milliGRAM(s) Oral three times a day PRN    VITALS:  T(F): 97.2  HR: 62  BP: 102/57  RR: 18  SpO2: 100%    PHYSICAL EXAM  GEN: no distress, comfortable  PULM: normal respiation  ABD: ecchymosis present; soft  EXT: No lower extremity edema  has ecchymosis in bilateral upper and lower extremity  NEURO: A&Ox3, moving all extremities    LABS                        12.5   8.31  )-----------( 224      ( 22 Oct 2022 06:15 )             37.2     10-22    137  |  100  |  18  ----------------------------<  103<H>  4.4   |  27  |  0.9    Ca    9.9      22 Oct 2022 06:15  Mg     1.7     10-22    TPro  7.0  /  Alb  4.6  /  TBili  0.4  /  DBili  x   /  AST  19  /  ALT  24  /  AlkPhos  68  10-22    PT/INR - ( 22 Oct 2022 06:15 )   PT: 16.30 sec;   INR: 1.41 ratio         PTT - ( 22 Oct 2022 13:40 )  PTT:63.7 sec              RADIOLOGY    
HPI  Patient is a 52y old Female who presents with a chief complaint of Multiple complaints (22 Oct 2022 15:49)    Currently admitted to medicine with the primary diagnosis of Cellulitis       Today is hospital day 5d.     INTERVAL HPI / OVERNIGHT EVENTS:  Patient was seen and examined at bedside  patient had uncomfortable experience from room mate last night; states she is traumatized with it. Her room mate has to be changed to different room.  feels sick at stomach and nausea  asking to change to IV zofran and antibiotics          PAST MEDICAL & SURGICAL HISTORY  Lupus    RA (rheumatoid arthritis)    HTN (hypertension)    CHF (congestive heart failure)    COPD (chronic obstructive pulmonary disease)    DVT (deep venous thrombosis)    Pulmonary embolism    History of laryngeal cancer    GERD (gastroesophageal reflux disease)    Cervical cancer    S/P appendectomy    S/P cholecystectomy    AICD (automatic cardioverter/defibrillator) present    S/P hysterectomy    History of back surgery    H/O foot surgery    S/P eye surgery    Subclavian arterial stenosis      ALLERGIES  adhesives (Rash; Urticaria)  Arava (Anaphylaxis; Angioedema)  Avelox (Other)  Plaquenil (Other)  Vantin (Other (Mild))    MEDICATIONS  STANDING MEDICATIONS  aspirin enteric coated 81 milliGRAM(s) Oral daily  budesonide  80 MICROgram(s)/formoterol 4.5 MICROgram(s) Inhaler 2 Puff(s) Inhalation two times a day  doxycycline IVPB 100 milliGRAM(s) IV Intermittent every 12 hours  heparin  Infusion. 1200 Unit(s)/Hr IV Continuous <Continuous>  hydrALAZINE 50 milliGRAM(s) Oral two times a day  magnesium oxide 400 milliGRAM(s) Oral three times a day with meals  magnesium sulfate  IVPB 2 Gram(s) IV Intermittent daily  metoprolol succinate  milliGRAM(s) Oral daily  morphine ER Tablet 120 milliGRAM(s) Oral three times a day  polyethylene glycol 3350 17 Gram(s) Oral daily  senna 2 Tablet(s) Oral at bedtime  trimethoprim  160 mG/sulfamethoxazole 800 mG 1 Tablet(s) Oral two times a day  warfarin 5 milliGRAM(s) Oral once    PRN MEDICATIONS  ALBUTerol    90 MICROgram(s) HFA Inhaler 2 Puff(s) Inhalation every 6 hours PRN  ALPRAZolam 2 milliGRAM(s) Oral three times a day PRN  heparin   Injectable 5500 Unit(s) IV Push every 6 hours PRN  heparin   Injectable 2500 Unit(s) IV Push every 6 hours PRN  HYDROmorphone   Tablet 2 milliGRAM(s) Oral three times a day PRN  ondansetron Injectable 4 milliGRAM(s) IV Push every 6 hours PRN    VITALS:  T(F): 97  HR: 74  BP: 135/64  RR: 18  SpO2: --    PHYSICAL EXAM  GEN: no distress, comfortable  PULM: BS heard b/l equal, No wheezing  CVS: S1S2 present, no rubs or gallops  ABD: Soft, non-distended, no guarding; non-tender  EXT: No lower extremity edema  NEURO: A&Ox3, moving all extremities    LABS                        12.7   7.72  )-----------( 221      ( 23 Oct 2022 10:41 )             37.4     10-23    142  |  103  |  22<H>  ----------------------------<  124<H>  4.2   |  23  |  1.1    Ca    9.9      23 Oct 2022 10:41  Mg     1.5     10-23    TPro  7.1  /  Alb  4.3  /  TBili  0.3  /  DBili  x   /  AST  22  /  ALT  22  /  AlkPhos  78  10-23    PT/INR - ( 23 Oct 2022 10:41 )   PT: 24.70 sec;   INR: 2.12 ratio         PTT - ( 23 Oct 2022 10:41 )  PTT:82.2 sec              RADIOLOGY    
HPI  Patient is a 52y old Female who presents with a chief complaint of Multiple complaints (24 Oct 2022 09:50)    Currently admitted to medicine with the primary diagnosis of Cellulitis       Today is hospital day 6d.     INTERVAL HPI / OVERNIGHT EVENTS:  Patient was seen and examined at bedside  Patient Feels better  nausea improved; tolerating diet  no chest pain or SOB        PAST MEDICAL & SURGICAL HISTORY  Lupus    RA (rheumatoid arthritis)    HTN (hypertension)    CHF (congestive heart failure)    COPD (chronic obstructive pulmonary disease)    DVT (deep venous thrombosis)    Pulmonary embolism    History of laryngeal cancer    GERD (gastroesophageal reflux disease)    Cervical cancer    S/P appendectomy    S/P cholecystectomy    AICD (automatic cardioverter/defibrillator) present    S/P hysterectomy    History of back surgery    H/O foot surgery    S/P eye surgery    Subclavian arterial stenosis      ALLERGIES  adhesives (Rash; Urticaria)  Arava (Anaphylaxis; Angioedema)  Avelox (Other)  Plaquenil (Other)  Vantin (Other (Mild))    MEDICATIONS  STANDING MEDICATIONS  aspirin enteric coated 81 milliGRAM(s) Oral daily  budesonide  80 MICROgram(s)/formoterol 4.5 MICROgram(s) Inhaler 2 Puff(s) Inhalation two times a day  doxycycline IVPB 100 milliGRAM(s) IV Intermittent every 12 hours  heparin  Infusion. 1200 Unit(s)/Hr IV Continuous <Continuous>  hydrALAZINE 50 milliGRAM(s) Oral two times a day  magnesium sulfate  IVPB 2 Gram(s) IV Intermittent once  magnesium sulfate  IVPB 2 Gram(s) IV Intermittent daily  metoprolol succinate  milliGRAM(s) Oral daily  morphine ER Tablet 120 milliGRAM(s) Oral three times a day  pantoprazole  Injectable 40 milliGRAM(s) IV Push daily  polyethylene glycol 3350 17 Gram(s) Oral daily  senna 2 Tablet(s) Oral at bedtime  trimethoprim  160 mG/sulfamethoxazole 800 mG 1 Tablet(s) Oral two times a day  warfarin 7.5 milliGRAM(s) Oral once    PRN MEDICATIONS  ALBUTerol    90 MICROgram(s) HFA Inhaler 2 Puff(s) Inhalation every 6 hours PRN  ALPRAZolam 2 milliGRAM(s) Oral three times a day PRN  heparin   Injectable 5500 Unit(s) IV Push every 6 hours PRN  heparin   Injectable 2500 Unit(s) IV Push every 6 hours PRN  HYDROmorphone   Tablet 2 milliGRAM(s) Oral three times a day PRN  ondansetron Injectable 4 milliGRAM(s) IV Push every 6 hours PRN    VITALS:  T(F): 97.3  HR: 66  BP: 136/63  RR: 19  SpO2: --    PHYSICAL EXAM  GEN: no distress, comfortable  PULM: normal respiration  EXT: No lower extremity edema  NEURO: A&Ox3, moving all extremities    LABS                        12.3   8.18  )-----------( 219      ( 24 Oct 2022 06:34 )             36.6     10-24    138  |  101  |  19  ----------------------------<  90  4.3   |  24  |  0.9    Ca    9.5      24 Oct 2022 06:34  Mg     1.6     10-24    TPro  6.8  /  Alb  4.4  /  TBili  0.3  /  DBili  x   /  AST  15  /  ALT  17  /  AlkPhos  61  10-24    PT/INR - ( 24 Oct 2022 06:34 )   PT: 25.10 sec;   INR: 2.15 ratio         PTT - ( 24 Oct 2022 11:30 )  PTT:96.4 sec              RADIOLOGY    
Patient is a 52y old  Female who presents with a chief complaint of Multiple complaints (23 Oct 2022 17:07)      INTERVAL HPI/OVERNIGHT EVENTS:  None    FAMILY HISTORY:  Family history of cervical cancer    FH: thyroid cancer      T(C): 36.2 (10-24-22 @ 05:46), Max: 36.8 (10-23-22 @ 19:53)  HR: 59 (10-24-22 @ 05:46) (59 - 74)  BP: 101/56 (10-24-22 @ 05:46) (101/56 - 135/64)  RR: 18 (10-24-22 @ 05:46) (18 - 18)  SpO2: --  Wt(kg): --Vital Signs Last 24 Hrs  T(C): 36.2 (24 Oct 2022 05:46), Max: 36.8 (23 Oct 2022 19:53)  T(F): 97.1 (24 Oct 2022 05:46), Max: 98.2 (23 Oct 2022 19:53)  HR: 59 (24 Oct 2022 05:46) (59 - 74)  BP: 101/56 (24 Oct 2022 05:46) (101/56 - 135/64)  BP(mean): --  RR: 18 (24 Oct 2022 05:46) (18 - 18)  SpO2: --        PHYSICAL EXAM:  GENERAL: NAD, well-groomed, well-developed  HEAD:  Atraumatic, Normocephalic  EYES: EOMI, PERRLA, conjunctiva and sclera clear  ENMT: No tonsillar erythema, exudates, or enlargement; Moist mucous membranes, Good dentition, No lesions  NECK: Supple, No JVD, Normal thyroid  NERVOUS SYSTEM:  Alert & Oriented X3, Good concentration; Motor Strength 5/5 B/L upper and lower extremities; DTRs 2+ intact and symmetric  CHEST/LUNG: No rales, rhonchi, wheezing, or rubs  HEART: Regular rate and rhythm; No murmurs, rubs, or gallops  ABDOMEN: Soft, Nontender, Nondistended; Bowel sounds present  EXTREMITIES:  2+ Peripheral Pulses, No clubbing, cyanosis, or edema  LYMPH: No lymphadenopathy noted  SKIN: echymosis throughout skin. Cellulitis LLE.       ALBUTerol    90 MICROgram(s) HFA Inhaler 2 Puff(s) Inhalation every 6 hours PRN  ALPRAZolam 2 milliGRAM(s) Oral three times a day PRN  aspirin enteric coated 81 milliGRAM(s) Oral daily  budesonide  80 MICROgram(s)/formoterol 4.5 MICROgram(s) Inhaler 2 Puff(s) Inhalation two times a day  doxycycline IVPB 100 milliGRAM(s) IV Intermittent every 12 hours  heparin   Injectable 5500 Unit(s) IV Push every 6 hours PRN  heparin   Injectable 2500 Unit(s) IV Push every 6 hours PRN  heparin  Infusion. 1200 Unit(s)/Hr IV Continuous <Continuous>  hydrALAZINE 50 milliGRAM(s) Oral two times a day  HYDROmorphone   Tablet 2 milliGRAM(s) Oral three times a day PRN  magnesium oxide 400 milliGRAM(s) Oral three times a day with meals  magnesium sulfate  IVPB 2 Gram(s) IV Intermittent daily  metoprolol succinate  milliGRAM(s) Oral daily  morphine ER Tablet 120 milliGRAM(s) Oral three times a day  ondansetron Injectable 4 milliGRAM(s) IV Push every 6 hours PRN  pantoprazole  Injectable 40 milliGRAM(s) IV Push daily  polyethylene glycol 3350 17 Gram(s) Oral daily  senna 2 Tablet(s) Oral at bedtime  trimethoprim  160 mG/sulfamethoxazole 800 mG 1 Tablet(s) Oral two times a day      HEALTH ISSUES - PROBLEM Dx:        
Patient is a 52y old  Female who presents with a chief complaint of Multiple complaints (24 Oct 2022 13:30)      INTERVAL HPI/OVERNIGHT EVENTS:  None    FAMILY HISTORY:  Family history of cervical cancer    FH: thyroid cancer      T(C): 37.1 (10-25-22 @ 05:12), Max: 37.1 (10-25-22 @ 05:12)  HR: 58 (10-25-22 @ 05:12) (58 - 83)  BP: 104/54 (10-25-22 @ 05:12) (104/54 - 161/74)  RR: 18 (10-25-22 @ 05:12) (18 - 19)  SpO2: --  Wt(kg): --Vital Signs Last 24 Hrs  T(C): 37.1 (25 Oct 2022 05:12), Max: 37.1 (25 Oct 2022 05:12)  T(F): 98.7 (25 Oct 2022 05:12), Max: 98.7 (25 Oct 2022 05:12)  HR: 58 (25 Oct 2022 05:12) (58 - 83)  BP: 104/54 (25 Oct 2022 05:12) (104/54 - 161/74)  BP(mean): --  RR: 18 (25 Oct 2022 05:12) (18 - 19)  SpO2: --        PHYSICAL EXAM:  GENERAL: NAD, well-groomed, well-developed  HEAD:  Atraumatic, Normocephalic  EYES: EOMI, PERRLA, conjunctiva and sclera clear  ENMT: No tonsillar erythema, exudates, or enlargement; Moist mucous membranes, Good dentition, No lesions  NECK: Supple, No JVD, Normal thyroid  NERVOUS SYSTEM:  Alert & Oriented X3, Good concentration; Motor Strength 5/5 B/L upper and lower extremities; DTRs 2+ intact and symmetric  CHEST/LUNG: No rales, rhonchi, wheezing, or rubs  HEART: Regular rate and rhythm; No murmurs, rubs, or gallops  ABDOMEN: Soft, Nontender, Nondistended; Bowel sounds present  EXTREMITIES:  2+ Peripheral Pulses, No clubbing, cyanosis, or edema  LYMPH: No lymphadenopathy noted  SKIN: Ecchymosis throughout body. Cellulitis on right LE.       ALBUTerol    90 MICROgram(s) HFA Inhaler 2 Puff(s) Inhalation every 6 hours PRN  ALPRAZolam 2 milliGRAM(s) Oral three times a day PRN  aspirin enteric coated 81 milliGRAM(s) Oral daily  budesonide  80 MICROgram(s)/formoterol 4.5 MICROgram(s) Inhaler 2 Puff(s) Inhalation two times a day  doxycycline IVPB 100 milliGRAM(s) IV Intermittent every 12 hours  heparin   Injectable 5500 Unit(s) IV Push every 6 hours PRN  heparin   Injectable 2500 Unit(s) IV Push every 6 hours PRN  heparin  Infusion. 1200 Unit(s)/Hr IV Continuous <Continuous>  hydrALAZINE 50 milliGRAM(s) Oral two times a day  HYDROmorphone   Tablet 2 milliGRAM(s) Oral three times a day PRN  magnesium sulfate  IVPB 2 Gram(s) IV Intermittent daily  magnesium sulfate  IVPB 2 Gram(s) IV Intermittent once  metoprolol succinate  milliGRAM(s) Oral daily  morphine ER Tablet 120 milliGRAM(s) Oral three times a day  ondansetron Injectable 4 milliGRAM(s) IV Push every 6 hours PRN  pantoprazole  Injectable 40 milliGRAM(s) IV Push daily  polyethylene glycol 3350 17 Gram(s) Oral daily  senna 2 Tablet(s) Oral at bedtime  trimethoprim  160 mG/sulfamethoxazole 800 mG 1 Tablet(s) Oral two times a day          
SUBJECTIVE:    Patient is a 52y old Female who presents with a chief complaint of Diffuse Ecchymosis and Upper and Lower Extremity Nodules (20 Oct 2022 07:57)    Currently admitted to medicine with the primary diagnosis of: antiphospholipid syndrome    Today is hospital day 2d.     Overnight Events:     No significant overnight events    PAST MEDICAL & SURGICAL HISTORY  Lupus    RA (rheumatoid arthritis)    HTN (hypertension)    CHF (congestive heart failure)    COPD (chronic obstructive pulmonary disease)    DVT (deep venous thrombosis)    Pulmonary embolism    History of laryngeal cancer    GERD (gastroesophageal reflux disease)    Cervical cancer    S/P appendectomy    S/P cholecystectomy    AICD (automatic cardioverter/defibrillator) present    S/P hysterectomy    History of back surgery    H/O foot surgery    S/P eye surgery    Subclavian arterial stenosis      SOCIAL HISTORY:  No significant social hx    ALLERGIES:  adhesives (Rash; Urticaria)  Arava (Anaphylaxis; Angioedema)  Avelox (Other)  Plaquenil (Other)  Vantin (Other (Mild))    MEDICATIONS:  STANDING MEDICATIONS  aspirin enteric coated 81 milliGRAM(s) Oral daily  budesonide  80 MICROgram(s)/formoterol 4.5 MICROgram(s) Inhaler 2 Puff(s) Inhalation two times a day  doxycycline IVPB      doxycycline IVPB 100 milliGRAM(s) IV Intermittent once  doxycycline IVPB 100 milliGRAM(s) IV Intermittent every 12 hours  heparin  Infusion. 1200 Unit(s)/Hr IV Continuous <Continuous>  hydrALAZINE 50 milliGRAM(s) Oral two times a day  metoprolol succinate  milliGRAM(s) Oral daily  morphine ER Tablet 120 milliGRAM(s) Oral three times a day  polyethylene glycol 3350 17 Gram(s) Oral daily  senna 2 Tablet(s) Oral at bedtime    PRN MEDICATIONS  ALBUTerol    90 MICROgram(s) HFA Inhaler 2 Puff(s) Inhalation every 6 hours PRN  ALPRAZolam 2 milliGRAM(s) Oral three times a day PRN  heparin   Injectable 5500 Unit(s) IV Push every 6 hours PRN  heparin   Injectable 2500 Unit(s) IV Push every 6 hours PRN    VITALS:   ICU Vital Signs Last 24 Hrs  T(C): 36.7 (20 Oct 2022 04:47), Max: 36.7 (20 Oct 2022 04:47)  T(F): 98.1 (20 Oct 2022 04:47), Max: 98.1 (20 Oct 2022 04:47)  HR: 63 (20 Oct 2022 06:05) (63 - 73)  BP: 145/72 (20 Oct 2022 06:05) (115/56 - 205/89)  BP(mean): 102 (20 Oct 2022 06:05) (102 - 102)  RR: 19 (20 Oct 2022 04:47) (18 - 19)  SpO2: 96% (20 Oct 2022 08:22) (96% - 100%)    O2 Parameters below as of 20 Oct 2022 08:22  Patient On (Oxygen Delivery Method): room air      LABS:                        13.0   8.25  )-----------( 206      ( 20 Oct 2022 08:47 )             38.8     10-    139  |  101  |  11  ----------------------------<  104<H>  3.5   |  27  |  0.8    Ca    9.2      19 Oct 2022 05:05  Mg     1.6     10    TPro  7.0  /  Alb  4.6  /  TBili  0.4  /  DBili  x   /  AST  34  /  ALT  39  /  AlkPhos  75  10-18    PT/INR - ( 20 Oct 2022 08:47 )   PT: 10.70 sec;   INR: 0.94 ratio        PTT - ( 20 Oct 2022 08:47 )  PTT:>200.0 sec  Urinalysis Basic - ( 18 Oct 2022 17:14 )    Color: Yellow / Appearance: Clear / S.029 / pH: x  Gluc: x / Ketone: Trace  / Bili: Negative / Urobili: 6 mg/dL   Blood: x / Protein: 30 mg/dL / Nitrite: Negative   Leuk Esterase: Moderate / RBC: 12 /HPF / WBC 4 /HPF   Sq Epi: x / Non Sq Epi: 14 /HPF / Bacteria: Few      RADIOLOGY:    < from: CT Abdomen and Pelvis w/ IV Cont (10.18.22 @ 19:51) >    ACC: [id number] EXAM:  CT ABDOMEN AND PELVIS IC                          PROCEDURE DATE:  10/18/2022      INTERPRETATION:  CLINICAL STATEMENT: Hematuria. Renal stones.    TECHNIQUE: Contiguous axial CT images were obtained from the lower chest   to the pubic symphysis with IV contrast administration.  Oral contrast   was not administered.  Reformatted images in the coronal and sagittal   planes were acquired.    Comparison: CT abdomen and pelvis 2021.    FINDINGS:    LOWER CHEST: Cardiacpacing device.    HEPATOBILIARY: Post cholecystectomy with expected mild central   intrahepatic and extrahepatic biliary distention. Liver unremarkable.    SPLEEN: Unremarkable.    PANCREAS: Unremarkable.    ADRENAL GLANDS: Unremarkable.    KIDNEYS: No hydronephrosis or renal calculus. Subcentimeter bilateral   renal hypodensities, too small to characterize.    ABDOMINOPELVIC NODES: Unremarkable.    PELVIC ORGANS: Urinary bladder nondistended. Post hysterectomy.    PERITONEUM/MESENTERY/BOWEL: Post appendectomy. No bowel obstruction. No   ascites.    BONES/SOFT TISSUES: Multiple abdominal wall subcutaneous fat hyperdense   foci, may represent sequela of injection history. Degenerative change of   spine.    OTHER: Vascular calcifications.      IMPRESSION:  No evidence of acute intra-abdominal pathology.    --- End of Report ---      PHYSICAL EXAM:  GEN: in NAD  LUNGS: clear to ascultation, no active wheezing or crackles appreciated  HEART: normal rate and rhythm, no murmurs appreciated  CHEST: ACID/PPM  ABD: diffuse ecchymosis, soft, nontender, nondistended, +BS  EXT: diffuse ecchymosis, multiple nodular masses/possible abscesses in b/l upper and lower extremities  NEURO: A&Ox3      
SUBJECTIVE:    Patient is a 52y old Female who presents with a chief complaint of Diffuse Ecchymosis and Upper and Lower Extremity Nodules (20 Oct 2022 11:19)    Currently admitted to medicine with the primary diagnosis of: Diffuse Ecchymosis and Cellulitis    Today is hospital day 3d.     Overnight Events:     No significant overnight events    PAST MEDICAL & SURGICAL HISTORY  Lupus    RA (rheumatoid arthritis)    HTN (hypertension)    CHF (congestive heart failure)    COPD (chronic obstructive pulmonary disease)    DVT (deep venous thrombosis)    Pulmonary embolism    History of laryngeal cancer    GERD (gastroesophageal reflux disease)    Cervical cancer    S/P appendectomy    S/P cholecystectomy    AICD (automatic cardioverter/defibrillator) present    S/P hysterectomy    History of back surgery    H/O foot surgery    S/P eye surgery    Subclavian arterial stenosis      SOCIAL HISTORY:    No significant social hx    ALLERGIES:  adhesives (Rash; Urticaria)  Arava (Anaphylaxis; Angioedema)  Avelox (Other)  Plaquenil (Other)  Vantin (Other (Mild))    MEDICATIONS:  STANDING MEDICATIONS  aspirin enteric coated 81 milliGRAM(s) Oral daily  budesonide  80 MICROgram(s)/formoterol 4.5 MICROgram(s) Inhaler 2 Puff(s) Inhalation two times a day  doxycycline monohydrate Capsule 100 milliGRAM(s) Oral every 12 hours  heparin  Infusion. 1200 Unit(s)/Hr IV Continuous <Continuous>  hydrALAZINE 50 milliGRAM(s) Oral two times a day  metoprolol succinate  milliGRAM(s) Oral daily  morphine ER Tablet 120 milliGRAM(s) Oral three times a day  polyethylene glycol 3350 17 Gram(s) Oral daily  senna 2 Tablet(s) Oral at bedtime    PRN MEDICATIONS  ALBUTerol    90 MICROgram(s) HFA Inhaler 2 Puff(s) Inhalation every 6 hours PRN  ALPRAZolam 2 milliGRAM(s) Oral three times a day PRN  heparin   Injectable 5500 Unit(s) IV Push every 6 hours PRN  heparin   Injectable 2500 Unit(s) IV Push every 6 hours PRN    VITALS:   ICU Vital Signs Last 24 Hrs  T(C): 36.2 (21 Oct 2022 04:29), Max: 36.7 (20 Oct 2022 13:34)  T(F): 97.2 (21 Oct 2022 04:29), Max: 98 (20 Oct 2022 13:34)  HR: 62 (21 Oct 2022 04:29) (60 - 68)  BP: 106/54 (21 Oct 2022 04:29) (106/54 - 149/75)  RR: 18 (21 Oct 2022 04:29) (18 - 18)      LABS:                        13.2   8.77  )-----------( 228      ( 21 Oct 2022 07:45 )             39.3     10-21    141  |  104  |  11  ----------------------------<  106<H>  4.0   |  26  |  0.7    Ca    10.2      21 Oct 2022 07:45  Mg     1.9     10-21    TPro  7.4  /  Alb  4.8  /  TBili  0.5  /  DBili  x   /  AST  24  /  ALT  31  /  AlkPhos  76  10-21    PT/INR - ( 21 Oct 2022 07:45 )   PT: 11.40 sec;   INR: 1.00 ratio         PTT - ( 21 Oct 2022 07:45 )  PTT:138.3 sec      Culture - Urine (collected 18 Oct 2022 17:14)  Source: Clean Catch Clean Catch (Midstream)  Preliminary Report (20 Oct 2022 14:41):    10,000 - 49,000 CFU/mL Escherichia coli      RADIOLOGY:    < from: Xray Chest 1 View- PORTABLE-Urgent (Xray Chest 1 View- PORTABLE-Urgent .) (10.18.22 @ 20:33) >    ACC: [id number] EXAM:  XR CHEST PORTABLE URGENT 1V                          PROCEDURE DATE:  10/18/2022      INTERPRETATION:  Clinical History / Reason for exam: Weakness    Comparison : Chest radiograph May 26, 2022.    Technique/Positioning: APchest.    Findings:    Support devices: Left-sided pacemaker. Left subclavian stent    Cardiac/mediastinum/hilum: Unremarkable.    Lung parenchyma/Pleura: Within normal limits.    Skeleton/soft tissues: Scoliosis with degenerative changes.    Impression:    No radiographic evidence of acute cardiopulmonary disease.    --- End of Report ---      PHYSICAL EXAM:  GEN: in NAD  LUNGS: clear to ascultation b/l, no wheezing or crackles, diminished breath sounds b/l  HEART: normal rate and rhythm, no murmurs appreciated  CHEST: AICD/PPM  ABD: soft, nontender, nondistended, +BS, diffuse ecchymosis  EXT: multiple, increasing/enlarging hematomas/nodules w/ ecchymosis, LEs b/l slight more edematous than yesterday but non-pitting  NEURO: A&Ox3      
Pt seen and examined at bedside. No CP or SOB.    PAST MEDICAL & SURGICAL HISTORY:  Lupus    RA (rheumatoid arthritis)    HTN (hypertension)    CHF (congestive heart failure)    COPD (chronic obstructive pulmonary disease)    DVT (deep venous thrombosis)    Pulmonary embolism    History of laryngeal cancer    GERD (gastroesophageal reflux disease)    Cervical cancer    S/P appendectomy    S/P cholecystectomy    AICD (automatic cardioverter/defibrillator) present    S/P hysterectomy    History of back surgery    H/O foot surgery    S/P eye surgery    Subclavian arterial stenosis        VITAL SIGNS (Last 24 hrs):  T(C): 37.1 (10-19-22 @ 08:01), Max: 37.1 (10-19-22 @ 08:01)  HR: 67 (10-19-22 @ 08:01) (67 - 77)  BP: 130/69 (10-19-22 @ 08:01) (129/60 - 154/64)  RR: 18 (10-19-22 @ 08:01) (18 - 18)  SpO2: 97% (10-19-22 @ 08:01) (97% - 99%)  Wt(kg): --  Daily     Daily     I&O's Summary    18 Oct 2022 07:01  -  19 Oct 2022 07:00  --------------------------------------------------------  IN: 350 mL / OUT: 0 mL / NET: 350 mL        PHYSICAL EXAM:  GENERAL: NAD, well-developed  HEAD:  Atraumatic, Normocephalic  EYES: EOMI, PERRLA, conjunctiva and sclera clear  NECK: Supple, No JVD  CHEST/LUNG: Clear to auscultation bilaterally; No wheeze  HEART: Regular rate and rhythm; No murmurs, rubs, or gallops  ABDOMEN: Soft, Nontender, Nondistended; Bowel sounds present, multiple hematomas   EXTREMITIES:  2+ Peripheral Pulses, No clubbing, cyanosis, or edema, multiple hematomas, possible cellulitis   PSYCH: AAOx3  NEUROLOGY: non-focal  SKIN: No rashes or lesions    Labs Reviewed  Spoke to patient in regards to abnormal labs.    CBC Full  -  ( 19 Oct 2022 11:42 )  WBC Count : 7.45 K/uL  Hemoglobin : 12.6 g/dL  Hematocrit : 37.8 %  Platelet Count - Automated : 150 K/uL  Mean Cell Volume : 89.2 fL  Mean Cell Hemoglobin : 29.7 pg  Mean Cell Hemoglobin Concentration : 33.3 g/dL  Auto Neutrophil # : x  Auto Lymphocyte # : x  Auto Monocyte # : x  Auto Eosinophil # : x  Auto Basophil # : x  Auto Neutrophil % : x  Auto Lymphocyte % : x  Auto Monocyte % : x  Auto Eosinophil % : x  Auto Basophil % : x    BMP:    10-19 @ 05:05    Blood Urea Nitrogen - 11  Calcium - 9.2  Carbond Dioxide - 27  Chloride - 101  Creatinine - 0.8  Glucose - 104  Potassium - 3.5  Sodium - 139      Hemoglobin A1c -   PT/INR - ( 19 Oct 2022 05:05 )   PT: 10.60 sec;   INR: 0.93 ratio         PTT - ( 19 Oct 2022 11:50 )  PTT:49.0 sec  Urine Culture:        COVID Labs  CRP:      D-Dimer:            Imaging reviewed independently and reviewed read    < from: Xray Chest 1 View- PORTABLE-Urgent (Xray Chest 1 View- PORTABLE-Urgent .) (10.18.22 @ 20:33) >  Impression:    No radiographic evidence of acute cardiopulmonary disease.    < end of copied text >      MEDICATIONS  (STANDING):  aspirin enteric coated 81 milliGRAM(s) Oral daily  aztreonam  IVPB 2000 milliGRAM(s) IV Intermittent every 8 hours  budesonide  80 MICROgram(s)/formoterol 4.5 MICROgram(s) Inhaler 2 Puff(s) Inhalation two times a day  heparin  Infusion. 1200 Unit(s)/Hr (12 mL/Hr) IV Continuous <Continuous>  hydrALAZINE 50 milliGRAM(s) Oral two times a day  metoprolol succinate  milliGRAM(s) Oral daily  morphine ER Tablet 120 milliGRAM(s) Oral three times a day  polyethylene glycol 3350 17 Gram(s) Oral daily  senna 2 Tablet(s) Oral at bedtime  vancomycin  IVPB 750 milliGRAM(s) IV Intermittent every 12 hours  warfarin 5 milliGRAM(s) Oral once    MEDICATIONS  (PRN):  ALBUTerol    90 MICROgram(s) HFA Inhaler 2 Puff(s) Inhalation every 6 hours PRN Shortness of Breath and/or Wheezing  ALPRAZolam 2 milliGRAM(s) Oral three times a day PRN anxiety  heparin   Injectable 5500 Unit(s) IV Push every 6 hours PRN For aPTT less than 40  heparin   Injectable 2500 Unit(s) IV Push every 6 hours PRN For aPTT between 40 - 57

## 2022-10-27 NOTE — DISCHARGE NOTE PROVIDER - CARE PROVIDERS DIRECT ADDRESSES
Yes ,[email or web address]

## 2022-10-27 NOTE — DISCHARGE NOTE PROVIDER - CARE PROVIDER_API CALL
Buddy Sultana)  Hematology; Internal Medicine; Medical Oncology  [address] [place] [address]  Phone: [phone]  Fax: [phone]  Follow Up Time: 1 week

## 2022-10-27 NOTE — PROGRESS NOTE ADULT - REASON FOR ADMISSION
Multiple complaints
Diffuse Ecchymosis and Upper and Lower Extremity Nodules
Multiple complaints

## 2022-10-27 NOTE — DISCHARGE NOTE NURSING/CASE MANAGEMENT/SOCIAL WORK - NSFLUVACAGEDISCH_IMM_ALL_CORE
Subjective:      Patient ID: Maranda Zhang is a 69 y.o. female.    Chief Complaint: Hyperlipidemia    HPI     69F here for follow up of health conditions. Last seen by me at [facility] on 5/1/19.    Regarding the HLD. She is doing good with lipitor. No side effects. Watching diet and trying to eat healthier. Calcium score last year mild/moderate.    Exercise: Walks her 3 dogs, no weight bearing,    She has osteopenia- takes calcium and vitamin d daily. No milk intake.     Diet: tries to eat healthy. Only eats red meat weekly ( has heart disease). Still drinking water    BP cuff at home- 147/89 today. At home 120/80s. She does get nervous when coming to the doctor. About 2 weeks ago she started with sinus congestion and chest congestion. She is having right ear pain (wears hearing aid). No fevers/chills. Suspects this is the culprit for BP. Taking otc antihistamine.     Mammo UTD.    PHQ2 screening negative. Colonoscopy up-to-date.    Review of patient's allergies indicates:  No Known Allergies    Current Outpatient Medications:     atorvastatin (LIPITOR) 20 MG tablet, Take 1 tablet (20 mg total) by mouth once daily., Disp: 90 tablet, Rfl: 2    calcium carbonate-vitamin D3 500 mg(1,250mg) -400 unit Chew, Take 1 tablet by mouth 2 (two) times daily. , Disp: , Rfl:     fluticasone propionate (FLONASE) 50 mcg/actuation nasal spray, Use 2 puffs in each nostril q day., Disp: 48 g, Rfl: 12    methylPREDNISolone (MEDROL DOSEPACK) 4 mg tablet, follow package directions, Disp: 21 tablet, Rfl: 0    Past Medical History:   Diagnosis Date    Age-related incipient cataract of both eyes 3/20/2018    Last Assessment & Plan:  Chronic stable. Continue to follow with Dr. Alonzo.    Anxiety     Bilateral sensorineural hearing loss 10/15/2013    Last Assessment & Plan:  Chronic stable. Currently uses hearing aids bilaterally.    Colon polyp     Diverticulosis of colon 3/20/2018    Last Assessment & Plan:  Chronic  stable. Continue to follow with Dr. Hoff.    Hypertension     Impaired fasting glucose     Mixed hyperlipidemia 7/22/2019    Last Assessment & Plan:  Chronic stable. Currently taking atorvastatin 20 mg daily. Continue to follow with Dr. Campa.  Complications of Hyperlipidemia discussed-Coronary Artery Disease, Stroke.  Recommendations low fat, low salt. low cholesterol diet, increase exercise to 30-60 minutes.    Osteopenia of multiple sites 3/20/2018    Last Assessment & Plan:  Chronic stable.  Currently taking calcium and vitamin D daily. Continue to follow with Dr. Campa.  Complications of osteoporosis is bone fracture.  Recommendations over 64 yo calcium 1500 mg through diet or supplementation and Vitamin D 800 IU daily.  Avoid smoking and heavy alcohol use.  Exercise daily and stay active.  Fall precautions.    Wears hearing aid      Past Surgical History:   Procedure Laterality Date    BREAST BIOPSY Left     COLONOSCOPY  2017    dr. hoff    LAPAROSCOPIC REPAIR OF FEMORAL HERNIA      TONSILLECTOMY      TOTAL ABDOMINAL HYSTERECTOMY W/ BILATERAL SALPINGOOPHORECTOMY       Family History   Problem Relation Age of Onset    Arthritis Mother     Hypertension Mother     Ovarian cancer Mother     Emphysema Father     Heart disease Father     COPD Father     Hypertension Brother     Hyperlipidemia Brother     Colon cancer Brother      Social History     Socioeconomic History    Marital status:      Spouse name: Not on file    Number of children: Not on file    Years of education: Not on file    Highest education level: Not on file   Occupational History    Not on file   Social Needs    Financial resource strain: Not on file    Food insecurity:     Worry: Not on file     Inability: Not on file    Transportation needs:     Medical: Not on file     Non-medical: Not on file   Tobacco Use    Smoking status: Never Smoker    Smokeless tobacco: Never Used   Substance and Sexual Activity     Alcohol use: Yes     Frequency: 2-3 times a week     Drinks per session: 1 or 2     Binge frequency: Never    Drug use: Never    Sexual activity: Yes     Partners: Male   Lifestyle    Physical activity:     Days per week: Not on file     Minutes per session: Not on file    Stress: Not on file   Relationships    Social connections:     Talks on phone: Not on file     Gets together: Not on file     Attends [employer] service: Not on file     Active member of club or organization: Not on file     Attends meetings of clubs or organizations: Not on file     Relationship status: Not on file   Other Topics Concern    Not on file   Social History Narrative    Not on file      Review of Systems   Constitutional: Positive for fatigue. Negative for activity change, chills, fever and unexpected weight change.   HENT: Positive for congestion, ear pain, hearing loss (baseline), postnasal drip, rhinorrhea, sinus pressure, sinus pain and sneezing. Negative for sore throat and trouble swallowing.    Eyes: Negative for discharge, itching and visual disturbance.   Respiratory: Positive for cough. Negative for chest tightness, shortness of breath and wheezing.    Cardiovascular: Negative.  Negative for chest pain and palpitations.   Gastrointestinal: Negative for blood in stool, constipation, diarrhea, nausea and vomiting.   Endocrine: Negative for polydipsia and polyuria.   Genitourinary: Negative.  Negative for difficulty urinating, dysuria, hematuria and menstrual problem.   Musculoskeletal: Negative for arthralgias, joint swelling, myalgias and neck pain.   Skin: Negative.    Allergic/Immunologic: Positive for environmental allergies.   Neurological: Negative for dizziness, weakness and headaches.   Hematological: Positive for adenopathy.   Psychiatric/Behavioral: Negative for confusion, dysphoric mood and sleep disturbance.         Objective:     Body mass index is 24.22 kg/m².  BP (!) 147/89 (BP Location: Right arm,  "Patient Position: Sitting, BP Method: Medium (Automatic))   Pulse 101   Temp 97.9 °F (36.6 °C)   Resp 16   Ht 5' 9" (1.753 m)   Wt 74.4 kg (164 lb)   SpO2 97%   BMI 24.22 kg/m²       Physical Exam   Constitutional: She is oriented to person, place, and time. She appears well-developed and well-nourished. No distress.   HENT:   Head: Normocephalic and atraumatic.   Right Ear: External ear normal.   Left Ear: External ear normal.   Mouth/Throat: Oropharynx is clear and moist. No oropharyngeal exudate.   Sinus tenderness:   Oropharyngeal exudate:   Effusions:    Eyes: Conjunctivae are normal. Right eye exhibits no discharge. Left eye exhibits no discharge.   Neck: Normal range of motion. Neck supple.   Cardiovascular: Normal rate, regular rhythm, normal heart sounds and intact distal pulses.   No murmur heard.  Pulmonary/Chest: Effort normal and breath sounds normal. No respiratory distress. She has no wheezes. She has no rales.   Abdominal: Soft. Bowel sounds are normal.   Musculoskeletal: She exhibits no tenderness.   Arthritic changes of DIP, PIP bilaterally   Lymphadenopathy:     She has no cervical adenopathy.   Neurological: She is alert and oriented to person, place, and time. No sensory deficit.   Skin: Skin is warm and dry. Capillary refill takes 2 to 3 seconds. No rash noted. She is not diaphoretic.   Psychiatric: She has a normal mood and affect. Her behavior is normal.   Nursing note and vitals reviewed.      Patient Outreach on 01/31/2020   Component Date Value Ref Range Status    Cholesterol 05/22/2019 231* 0 - 200 MG/DL Final    [employer]     Triglycerides 05/22/2019 104  mg/dL Final    HDL 05/22/2019 71  mg/dL Final    LDL Cholesterol 05/22/2019 139  MG/DL Final    Hdl/Cholesterol Ratio 05/22/2019 3.3   Final    Hepatitis C Ab 03/23/2018 Negative   Final    NON REACTIVE     No results found in the last 24 hours.   Assessment:       ICD-10-CM ICD-9-CM   1. Mixed hyperlipidemia E78.2 272.2 "   2. Wears hearing aid Z97.4 V45.89   3. History of prediabetes Z87.898 V12.29   4. BMI 24.0-24.9, adult Z68.24 V85.1   5. Osteopenia of multiple sites M85.89 733.90   6. Bilateral sensorineural hearing loss H90.3 389.18   7. History of hypertension Z86.79 V12.59   8. Seasonal allergic rhinitis due to pollen J30.1 477.0   9. Impaired fasting blood sugar  R73.01 790.21   10. Vitamin D insufficiency  E55.9 268.9       Plan:     #Allergic rhinitis   -no s/s infection. Start flonase (sent to human). Continue allegra.   -medrol dose pack.     #HLD  -Lipid panel 3/2018: chol 241, tri 80, hdl 77, ldl 148  -Lipid panel 5/22/19: chol 231, tri 104, hdl 71, ldl 139  -CT Calcium score 6/7/19: calcium score 50. Mild plaque burden.  -Lipitor 20 mg daily- refilled     #BMI 24.22  -Continue healthy diet, exercise (weight bearing included)    #History of HTN  -Likely white coat. Elevated today. Normal last visit.   -2/23/17 microalbumin negative --> 5/22/19: 26, cmp normal (gfr >60, cr 0.57), TSH 1.66, cbc normal   -Last eye exam with Dr. Butler on 1/7/20. No retinopathy.  -continue monitor at home (likely due to allergies)    #Osteopenia   -s/p TIM with BSO  -DEXA 9/19/18: osteopenia. Repeat in 5 years.   -Continue maldonado, vit D   -5/22/19: vit D 29.3    #History of prediabetes, impaired fasting glucose   -5/29/13: ha1c 6.1%. Glucose 109 in 5/2019    #HCM  -Mammogram: 10/23/19. BIRADS 2. Repeat in 1 years.   -Colonoscopy: 2/2/17. Dr. Malave. Repeat in 3 years. She will check into this.   -Hep C screening: negative 3/23/18    Has my chart. Follow up in 6 months.     Health Maintenance Topics with due status: Not Due       Topic Last Completion Date    Colonoscopy 02/02/2017    TETANUS VACCINE 02/23/2017    DEXA SCAN 09/19/2018    Lipid Panel 05/22/2019    Mammogram 10/22/2019       Immunization History   Administered Date(s) Administered    Influenza 09/01/2019    Influenza - High Dose - PF (65 years and older) 11/29/2016, 09/27/2017,  10/25/2019    Influenza - Quadrivalent 09/12/2018    Influenza - Quadrivalent - PF (6 months and older) 10/18/2014, 09/23/2015    Influenza - Trivalent (ADULT) 09/23/2015    Influenza - Trivalent - PF (ADULT) 09/01/2013, 10/18/2014    Pneumococcal Conjugate - 13 Valent 02/23/2017    Pneumococcal Polysaccharide - 23 Valent 02/01/2016    Tdap 02/23/2017       Belle Campa M.D.    Orders Placed This Encounter   Procedures    CBC auto differential    Comprehensive metabolic panel    Lipid panel    Hemoglobin A1c    TSH    Magnesium    Vitamin D    Microalbumin/creatinine urine ratio    Urinalysis      Medications Ordered This Encounter   Medications    atorvastatin (LIPITOR) 20 MG tablet     Sig: Take 1 tablet (20 mg total) by mouth once daily.     Dispense:  90 tablet     Refill:  2    fluticasone propionate (FLONASE) 50 mcg/actuation nasal spray     Sig: Use 2 puffs in each nostril q day.     Dispense:  48 g     Refill:  12    methylPREDNISolone (MEDROL DOSEPACK) 4 mg tablet     Sig: follow package directions     Dispense:  21 tablet     Refill:  0             Adult

## 2022-10-27 NOTE — DISCHARGE NOTE PROVIDER - NSDCFUSCHEDAPPT_GEN_ALL_CORE_FT
[facility]  MEDMGMT OP [address]  Scheduled Appointment: 11/02/2022    Briana Tejeda  [facility]  ONCPAINMGT [address]  Scheduled Appointment: 11/02/2022    [facility]  CARDIOLOGY [address]  Scheduled Appointment: 11/16/2022    Buddy Sultana  [facility]  HEMONC [address]  Scheduled Appointment: 11/21/2022    Power Hanna  [facility]  OTOLARYNG [address]  Scheduled Appointment: 11/29/2022    Andre Hurst  [facility]  PULMMED [address]  Scheduled Appointment: 12/19/2022

## 2022-10-28 ENCOUNTER — APPOINTMENT (OUTPATIENT)
Dept: HEMATOLOGY ONCOLOGY | Facility: CLINIC | Age: 52
End: 2022-10-28

## 2022-11-03 ENCOUNTER — APPOINTMENT (OUTPATIENT)
Dept: MEDICATION MANAGEMENT | Facility: CLINIC | Age: 52
End: 2022-11-03

## 2022-11-03 ENCOUNTER — OUTPATIENT (OUTPATIENT)
Dept: OUTPATIENT SERVICES | Facility: HOSPITAL | Age: 52
LOS: 1 days | Discharge: HOME | End: 2022-11-03

## 2022-11-03 DIAGNOSIS — D68.61 ANTIPHOSPHOLIPID SYNDROME: ICD-10-CM

## 2022-11-03 DIAGNOSIS — Z79.01 LONG TERM (CURRENT) USE OF ANTICOAGULANTS: ICD-10-CM

## 2022-11-03 DIAGNOSIS — Z90.49 ACQUIRED ABSENCE OF OTHER SPECIFIED PARTS OF DIGESTIVE TRACT: Chronic | ICD-10-CM

## 2022-11-03 DIAGNOSIS — Z98.890 OTHER SPECIFIED POSTPROCEDURAL STATES: Chronic | ICD-10-CM

## 2022-11-03 DIAGNOSIS — Z95.810 PRESENCE OF AUTOMATIC (IMPLANTABLE) CARDIAC DEFIBRILLATOR: Chronic | ICD-10-CM

## 2022-11-03 DIAGNOSIS — I77.1 STRICTURE OF ARTERY: Chronic | ICD-10-CM

## 2022-11-03 DIAGNOSIS — Z90.710 ACQUIRED ABSENCE OF BOTH CERVIX AND UTERUS: Chronic | ICD-10-CM

## 2022-11-03 DIAGNOSIS — D68.59 OTHER PRIMARY THROMBOPHILIA: ICD-10-CM

## 2022-11-03 LAB
INR PPP: 2.8 RATIO
POCT-PROTHROMBIN TIME: 33.8 SECS
QUALITY CONTROL: YES

## 2022-11-07 DIAGNOSIS — B96.20 UNSPECIFIED ESCHERICHIA COLI [E. COLI] AS THE CAUSE OF DISEASES CLASSIFIED ELSEWHERE: ICD-10-CM

## 2022-11-07 DIAGNOSIS — D68.61 ANTIPHOSPHOLIPID SYNDROME: ICD-10-CM

## 2022-11-07 DIAGNOSIS — I11.0 HYPERTENSIVE HEART DISEASE WITH HEART FAILURE: ICD-10-CM

## 2022-11-07 DIAGNOSIS — Z85.41 PERSONAL HISTORY OF MALIGNANT NEOPLASM OF CERVIX UTERI: ICD-10-CM

## 2022-11-07 DIAGNOSIS — Z51.81 ENCOUNTER FOR THERAPEUTIC DRUG LEVEL MONITORING: ICD-10-CM

## 2022-11-07 DIAGNOSIS — I50.32 CHRONIC DIASTOLIC (CONGESTIVE) HEART FAILURE: ICD-10-CM

## 2022-11-07 DIAGNOSIS — E83.42 HYPOMAGNESEMIA: ICD-10-CM

## 2022-11-07 DIAGNOSIS — F41.9 ANXIETY DISORDER, UNSPECIFIED: ICD-10-CM

## 2022-11-07 DIAGNOSIS — R23.3 SPONTANEOUS ECCHYMOSES: ICD-10-CM
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
## 2022-11-07 DIAGNOSIS — Z91.048 OTHER NONMEDICINAL SUBSTANCE ALLERGY STATUS: ICD-10-CM

## 2022-11-07 DIAGNOSIS — L03.116 CELLULITIS OF LEFT LOWER LIMB: ICD-10-CM

## 2022-11-07 DIAGNOSIS — Z95.810 PRESENCE OF AUTOMATIC (IMPLANTABLE) CARDIAC DEFIBRILLATOR: ICD-10-CM

## 2022-11-07 DIAGNOSIS — Z95.828 PRESENCE OF OTHER VASCULAR IMPLANTS AND GRAFTS: ICD-10-CM

## 2022-11-07 DIAGNOSIS — Z92.21 PERSONAL HISTORY OF ANTINEOPLASTIC CHEMOTHERAPY: ICD-10-CM

## 2022-11-07 DIAGNOSIS — J44.9 CHRONIC OBSTRUCTIVE PULMONARY DISEASE, UNSPECIFIED: ICD-10-CM

## 2022-11-07 DIAGNOSIS — N30.91 CYSTITIS, UNSPECIFIED WITH HEMATURIA: ICD-10-CM

## 2022-11-07 DIAGNOSIS — M79.81 NONTRAUMATIC HEMATOMA OF SOFT TISSUE: ICD-10-CM

## 2022-11-07 DIAGNOSIS — R11.2 NAUSEA WITH VOMITING, UNSPECIFIED: ICD-10-CM

## 2022-11-07 DIAGNOSIS — Z88.8 ALLERGY STATUS TO OTHER DRUGS, MEDICAMENTS AND BIOLOGICAL SUBSTANCES STATUS: ICD-10-CM

## 2022-11-07 DIAGNOSIS — I25.2 OLD MYOCARDIAL INFARCTION: ICD-10-CM

## 2022-11-07 DIAGNOSIS — M06.9 RHEUMATOID ARTHRITIS, UNSPECIFIED: ICD-10-CM

## 2022-11-07 DIAGNOSIS — Z86.718 PERSONAL HISTORY OF OTHER VENOUS THROMBOSIS AND EMBOLISM: ICD-10-CM

## 2022-11-07 DIAGNOSIS — R58 HEMORRHAGE, NOT ELSEWHERE CLASSIFIED: ICD-10-CM

## 2022-11-07 DIAGNOSIS — R35.0 FREQUENCY OF MICTURITION: ICD-10-CM

## 2022-11-07 DIAGNOSIS — G89.4 CHRONIC PAIN SYNDROME: ICD-10-CM

## 2022-11-07 DIAGNOSIS — T82.856D STENOSIS OF PERIPHERAL VASCULAR STENT, SUBSEQUENT ENCOUNTER: ICD-10-CM

## 2022-11-07 DIAGNOSIS — R31.9 HEMATURIA, UNSPECIFIED: ICD-10-CM

## 2022-11-07 DIAGNOSIS — M32.9 SYSTEMIC LUPUS ERYTHEMATOSUS, UNSPECIFIED: ICD-10-CM

## 2022-11-07 DIAGNOSIS — Z86.711 PERSONAL HISTORY OF PULMONARY EMBOLISM: ICD-10-CM

## 2022-11-07 DIAGNOSIS — Z79.82 LONG TERM (CURRENT) USE OF ASPIRIN: ICD-10-CM

## 2022-11-08 ENCOUNTER — APPOINTMENT (OUTPATIENT)
Dept: MEDICATION MANAGEMENT | Facility: CLINIC | Age: 52
End: 2022-11-08

## 2022-11-08 ENCOUNTER — OUTPATIENT (OUTPATIENT)
Dept: OUTPATIENT SERVICES | Facility: HOSPITAL | Age: 52
LOS: 1 days | Discharge: HOME | End: 2022-11-08

## 2022-11-08 DIAGNOSIS — Z79.01 LONG TERM (CURRENT) USE OF ANTICOAGULANTS: ICD-10-CM

## 2022-11-08 DIAGNOSIS — Z98.890 OTHER SPECIFIED POSTPROCEDURAL STATES: Chronic | ICD-10-CM

## 2022-11-08 DIAGNOSIS — D68.61 ANTIPHOSPHOLIPID SYNDROME: ICD-10-CM

## 2022-11-08 DIAGNOSIS — I77.1 STRICTURE OF ARTERY: Chronic | ICD-10-CM

## 2022-11-08 DIAGNOSIS — Z90.49 ACQUIRED ABSENCE OF OTHER SPECIFIED PARTS OF DIGESTIVE TRACT: Chronic | ICD-10-CM

## 2022-11-08 DIAGNOSIS — Z90.710 ACQUIRED ABSENCE OF BOTH CERVIX AND UTERUS: Chronic | ICD-10-CM

## 2022-11-08 DIAGNOSIS — Z95.810 PRESENCE OF AUTOMATIC (IMPLANTABLE) CARDIAC DEFIBRILLATOR: Chronic | ICD-10-CM

## 2022-11-08 LAB
INR PPP: 4 RATIO
POCT-PROTHROMBIN TIME: 48.5 SECS
QUALITY CONTROL: YES

## 2022-11-16 ENCOUNTER — NON-APPOINTMENT (OUTPATIENT)
Age: 52
End: 2022-11-16

## 2022-11-16 ENCOUNTER — APPOINTMENT (OUTPATIENT)
Dept: PAIN MANAGEMENT | Facility: CLINIC | Age: 52
End: 2022-11-16

## 2022-11-16 ENCOUNTER — APPOINTMENT (OUTPATIENT)
Dept: CARDIOLOGY | Facility: CLINIC | Age: 52
End: 2022-11-16

## 2022-11-16 ENCOUNTER — LABORATORY RESULT (OUTPATIENT)
Age: 52
End: 2022-11-16

## 2022-11-16 ENCOUNTER — APPOINTMENT (OUTPATIENT)
Dept: MEDICATION MANAGEMENT | Facility: CLINIC | Age: 52
End: 2022-11-16

## 2022-11-16 ENCOUNTER — OUTPATIENT (OUTPATIENT)
Dept: OUTPATIENT SERVICES | Facility: HOSPITAL | Age: 52
LOS: 1 days | Discharge: HOME | End: 2022-11-16

## 2022-11-16 VITALS — HEIGHT: 63 IN | BODY MASS INDEX: 27.46 KG/M2 | WEIGHT: 155 LBS

## 2022-11-16 DIAGNOSIS — Z90.49 ACQUIRED ABSENCE OF OTHER SPECIFIED PARTS OF DIGESTIVE TRACT: Chronic | ICD-10-CM

## 2022-11-16 DIAGNOSIS — Z98.890 OTHER SPECIFIED POSTPROCEDURAL STATES: Chronic | ICD-10-CM

## 2022-11-16 DIAGNOSIS — D68.61 ANTIPHOSPHOLIPID SYNDROME: ICD-10-CM

## 2022-11-16 DIAGNOSIS — Z79.01 LONG TERM (CURRENT) USE OF ANTICOAGULANTS: ICD-10-CM

## 2022-11-16 DIAGNOSIS — Z95.810 PRESENCE OF AUTOMATIC (IMPLANTABLE) CARDIAC DEFIBRILLATOR: Chronic | ICD-10-CM

## 2022-11-16 DIAGNOSIS — Z90.710 ACQUIRED ABSENCE OF BOTH CERVIX AND UTERUS: Chronic | ICD-10-CM

## 2022-11-16 DIAGNOSIS — I77.1 STRICTURE OF ARTERY: Chronic | ICD-10-CM

## 2022-11-16 LAB
INR PPP: 4.6 RATIO
POCT-PROTHROMBIN TIME: 35.4 SECS
QUALITY CONTROL: YES

## 2022-11-16 PROCEDURE — 99213 OFFICE O/P EST LOW 20 MIN: CPT

## 2022-11-16 PROCEDURE — 93296 REM INTERROG EVL PM/IDS: CPT

## 2022-11-16 PROCEDURE — 80305 DRUG TEST PRSMV DIR OPT OBS: CPT | Mod: QW

## 2022-11-16 PROCEDURE — 93295 DEV INTERROG REMOTE 1/2/MLT: CPT

## 2022-11-16 NOTE — ASSESSMENT
[FreeTextEntry1] : 52-year-old female with chronic pain. She has a history of lupus and many ongoing illnesses. She will continue with MS Contin 60 mg 2 tabs TID.  We will change her MS contin 15 mg to MSIR 15 mg BID. She will f/u in 8 weeks for re evaluation.\par \par I personally reviewed with the PA, this patient's history and physical exam findings, as documented above. I have discussed the relevant areas of concern, having direct implications to the presenting problems and illnesses, and I have personally examined all pertinent and positive and negative findings, which impact on the prior pain management treatment. \par \par \par Check of the  registry reveals compliance in regards to narcotic medication management use\par \par \par Urine drug screening collected today with rapid sample result consistent with given regimen. Sample to be sent for confirmatory testing.\par \par Briana Tejeda, MS, PA-C\par Danii Burnette MD\par

## 2022-11-16 NOTE — REASON FOR VISIT
[Follow-Up Visit] : a follow-up pain management visit [Spouse] : spouse [FreeTextEntry2] : follow up for chronic pain

## 2022-11-16 NOTE — HISTORY OF PRESENT ILLNESS
[FreeTextEntry1] : ORIGINAL PRESENTATION: Ms. Bauman is a 52-year-old woman who has been a patient in our office since 1996. At that time, she presented with complaints of lower back pain during pregnancy. Ultimately, the patient underwent a right L4-5 laminectomy over 20 years ago by Dr. Andre Carter. However, she still struggles with chronic pain. The patient has been medically managed at that and has trialed various preparations including Lortab, Vicodin, Dilaudid, Morphine, OxyContin, and methadone. She also has multiple complaints of pain secondary to a pacemaker having been placed. She also claims to have a history of throat cancer; however, we do not have any evidence of any cancer diagnosis. She also has a history of connective tissue disorders such as lupus and rheumatoid arthritis.\par \par TODAY: I had the pleasure of seeing Ms. Bauman today in follow up. Her previous history and physical findings have been reviewed.\par \par She is under our care for chronic pain secondary to lupus and lumbar issues which she is receiving continuing active treatment for. Since her last encounter she has been in the hospital due to issues with her cardiac stent that she previously had surgery for.  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 She is being treated by a cardiologist in [place] and will let us know the outcome with respect to possible surgery.\par \par In regard to her medication regimen she is currently on a regimen of MS Contin 60 mg 2 tabs TID in conjunction with ms contin er 15 mg bid   She states she feels she needs something for breakthrough and we discussed changing the ER 15 mg to IR which she is agreeable to discussing. She rates her pain a 6-7/10 today, a 6-7/10 at best and a 10/10 at worst.  We will not been lowering her medication as her cardiologist had asked us to consider us increasing her medication. She will undergo updated UDS testing.

## 2022-11-17 LAB — COCAINE METAB.OTHER UR-MCNC: NEGATIVE

## 2022-11-21 ENCOUNTER — APPOINTMENT (OUTPATIENT)
Dept: HEMATOLOGY ONCOLOGY | Facility: CLINIC | Age: 52
End: 2022-11-21

## 2022-11-22 ENCOUNTER — APPOINTMENT (OUTPATIENT)
Dept: MEDICATION MANAGEMENT | Facility: CLINIC | Age: 52
End: 2022-11-22

## 2022-11-22 ENCOUNTER — OUTPATIENT (OUTPATIENT)
Dept: OUTPATIENT SERVICES | Facility: HOSPITAL | Age: 52
LOS: 1 days | Discharge: HOME | End: 2022-11-22

## 2022-11-22 DIAGNOSIS — Z79.01 LONG TERM (CURRENT) USE OF ANTICOAGULANTS: ICD-10-CM

## 2022-11-22 DIAGNOSIS — Z90.49 ACQUIRED ABSENCE OF OTHER SPECIFIED PARTS OF DIGESTIVE TRACT: Chronic | ICD-10-CM

## 2022-11-22 DIAGNOSIS — Z98.890 OTHER SPECIFIED POSTPROCEDURAL STATES: Chronic | ICD-10-CM

## 2022-11-22 DIAGNOSIS — D68.61 ANTIPHOSPHOLIPID SYNDROME: ICD-10-CM

## 2022-11-22 DIAGNOSIS — I77.1 STRICTURE OF ARTERY: Chronic | ICD-10-CM

## 2022-11-22 DIAGNOSIS — Z95.810 PRESENCE OF AUTOMATIC (IMPLANTABLE) CARDIAC DEFIBRILLATOR: Chronic | ICD-10-CM

## 2022-11-22 DIAGNOSIS — Z90.710 ACQUIRED ABSENCE OF BOTH CERVIX AND UTERUS: Chronic | ICD-10-CM

## 2022-11-22 LAB
INR PPP: 3.7 RATIO
POCT-PROTHROMBIN TIME: 44.2 SECS
QUALITY CONTROL: YES

## 2022-11-24 NOTE — CONSULT NOTE ADULT - PROVIDER SPECIALTY LIST ADULT
[facility] ([facility]) NOTE    CC: APPLE, Hyperkalemia    Referring Physician: Dr. Toya Chester  PCP: Dr. Toya Chester  Primary Nephrologist: [facility]    Interval events:  Patient was seen in her room.  She remains quite lethargic and confused.  No family members present at the time of evaluation.  No acute events noted overnight.    HPI:   Patient is a 77-year-old female with past medical history of CKD stage IIIa, CVA with right-sided weakness, GERD, HLD, CAD, CHF, dementia, chronic indwelling Marrufo, T2DM, HTN, who resides at nursing facility, is presenting with puffiness in her face and sent to ER for further evaluation.    Upon arrival, patient was hemodynamically stable, required 4 L nasal cannula and was saturating 100%.  She was afebrile.  Initial labs showed sodium 146, potassium 7.1, bicarb 17, BUN/creatinine 146/3.20, magnesium 1.6.  Lactic acid was 0.5, proBNP 9500, and troponin was elevated at 54.  Patient was given multiple temporizing measures in the ER, as well as Lasix 40 mg IV x1 along with 2 boluses of 0.5 L normal saline.    Chest x-ray showing possible early infiltrate.    UA showing 30 protein, trace blood, 3-5 erythrocytes, greater than 100 leukocytes, large LE, positive nitrates and large bacteria.  Patient was started on empiric IV cefepime    Serial labs were monitored throughout the night.  This morning her sodium worsened to 153, potassium improved to 5.6, bicarb improved to 18, BUN/creatinine improved to 141/2.98.  Renal function is improving.  She was not placed on any IV fluids overnight.    Family is at bedside, does not have much knowledge of her medications at facility.  Per med rec review, she is on losartan 25 mg at bedtime, and Lasix 20 mg every morning.        Review of Systems:  Unable to assess, patient is altered      Inpatient Medications:  Current Facility-Administered Medications   Medication Dose Route Frequency Provider Last Rate Last Admin   • 
Heme/Onc
Vascular Surgery
hydrALAZINE (APRESOLINE) injection 10 mg  10 mg Intravenous Q6H PRN Toya Chester MD   10 mg at 11/24/22 0527   • metoPROLOL succinate (TOPROL-XL) ER tablet 50 mg  50 mg Oral Daily Toya Chester MD   50 mg at 11/24/22 1000   • hydrALAZINE (APRESOLINE) tablet 25 mg  25 mg Oral 3 times per day Toya Chester MD   25 mg at 11/24/22 1449   • diphenhydrAMINE (BENADRYL) capsule 25 mg  25 mg Oral TID PRN Toya Chester MD       • cefdinir (OMNICEF) capsule 300 mg  300 mg Oral Daily Toya Chester MD   300 mg at 11/24/22 1449   • sodium chloride 0.9% infusion   Intravenous Continuous PRN Toya Chester MD       • sodium chloride 0.9 % flush bag 25 mL  25 mL Intravenous PRN Toya Chester MD       • Magnesium Standard Replacement Protocol   Does not apply See Admin Instructions Toya Chester MD       • sodium chloride (PF) 0.9 % injection 10 mL  10 mL Injection PRN Toya Chester MD   10 mL at 11/22/22 2019   • sodium chloride (PF) 0.9 % injection 10 mL  10 mL Injection 2 times per day Toya Chester MD   10 mL at 11/23/22 1107   • sodium bicarbonate 8.4 % 100 mEq in dextrose 5 % 1,000 mL infusion   Intravenous Continuous Sunil Sharp MD   Completed at 11/24/22 1257   • dextrose 50 % injection 25 g  25 g Intravenous PRN Toya Chester MD       • dextrose 50 % injection 12.5 g  12.5 g Intravenous PRN Toya Chester MD       • glucagon (GLUCAGEN) injection 1 mg  1 mg Intramuscular PRN Toya Chester MD       • dextrose (GLUTOSE) 40 % gel 15 g  15 g Oral PRN Toya Chester MD       • dextrose (GLUTOSE) 40 % gel 30 g  30 g Oral PRN Toya Chester MD       • sodium chloride 0.9 % flush bag 25 mL  25 mL Intravenous PRN Terrence Cardozo DO       • sodium chloride (PF) 0.9 % injection 2 mL  2 mL Intracatheter 2 times per day Terrence Cardozo DO   2 mL at 11/24/22 0729   • acetaminophen (TYLENOL) tablet 650 mg  650 mg Oral Q6H PRN Toya Chester MD   650 mg at 11/21/22 0835   • 
atorvastatin (LIPITOR) tablet 40 mg  40 mg Oral QHS Toya Chester MD   40 mg at 11/22/22 2015   • ferrous sulfate (65 mg Fe per 325 mg) tablet 325 mg  325 mg Oral TID WC Toya Chester MD   325 mg at 11/24/22 1449   • [Held by provider] heparin (porcine) injection 5,000 Units  5,000 Units Subcutaneous 2 times per day Toya Chester MD   5,000 Units at 11/21/22 2045   • insulin glargine (LANTUS) injection 15 Units  15 Units Subcutaneous 2 times per day Toya Chester MD   15 Units at 11/24/22 1000   • levETIRAcetam (KepPRA) tablet 500 mg  500 mg Oral 2 times per day Toya Chester MD   500 mg at 11/24/22 1000   • montelukast (SINGULAIR) tablet 10 mg  10 mg Oral Nightly Toya Chester MD   10 mg at 11/22/22 2015   • pantoprazole (PROTONIX) EC tablet 40 mg  40 mg Oral Daily Toya Chester MD   40 mg at 11/24/22 1000   • senna (SENOKOT) 8.6 mg  1 tablet Oral QHS Toya Chester MD   8.6 mg at 11/22/22 2015   • dextrose 50 % injection 25 g  25 g Intravenous Once Terrence Cardozo, DO         Current Outpatient Medications   Medication Sig Dispense Refill   • [START ON 11/25/2022] metoPROLOL succinate (TOPROL-XL) 50 MG 24 hr tablet Take 1 tablet by mouth daily. Do not start before November 25, 2022.     • cefdinir (OMNICEF) 300 MG capsule Take 1 capsule by mouth daily. 7 capsule 0   • diphenhydrAMINE (BENADRYL) 25 MG capsule Take 1 capsule by mouth 3 times daily as needed for Itching.     • hydrALAZINE (APRESOLINE) 25 MG tablet Take 1 tablet by mouth every 8 hours.     • pantoprazole (PROTONIX) 40 MG tablet Take 40 mg by mouth daily.     • Cranberry 450 MG Cap Take 450 mg by mouth every 12 hours.     • ferrous sulfate 325 (65 FE) MG tablet Take 325 mg by mouth 3 times daily (with meals).     • levETIRAcetam (KepPRA) 500 MG tablet Take 500 mg by mouth every 12 hours.     • metoPROLOL succinate (TOPROL-XL) 25 MG 24 hr tablet Take 25 mg by mouth daily.     • Multiple Vitamin (MULTIVITAMIN ADULT PO) Take 
1 tablet by mouth daily.     • B Complex Vitamins (vitamin B complex) tablet Take 1 tablet by mouth daily.     • montelukast (SINGULAIR) 10 MG tablet Take 10 mg by mouth nightly.     • senna (SENOKOT) 8.6 MG tablet Take 1 tablet by mouth at bedtime. Hold if more than 2BM/24hr for constipation     • acetaminophen (TYLENOL) 325 MG tablet Take 650 mg by mouth every 6 hours as needed for Pain or Fever.     • atorvastatin (LIPITOR) 40 MG tablet Take 40 mg by mouth at bedtime.     • insulin detemir (LEVEMIR) 100 UNIT/ML injectable solution Inject 15 Units into the skin 2 times daily. Hold for BG <100     • insulin regular, human, (HumuLIN R, NovoLIN R) 100 UNIT/ML injectable solution Inject into the skin 4 times daily (before meals and nightly). Per sliding scale  200-250 = 2 units,  251-300 = 4 units,  301-350 = 6 units     • Glucagon 1 MG/0.2ML Solution Inject 1 mg into the muscle as needed (BG <70).     • aspirin 81 MG chewable tablet Chew 81 mg by mouth daily.     • ondansetron (ZOFRAN) 4 MG tablet Take 4 mg by mouth every 8 hours as needed.     • ascorbic acid (VITAMIN C) 500 MG tablet Take 500 mg by mouth 2 times daily.         Physical Examination:  Vital Signs:   Patient Vitals for the past 24 hrs:   BP Temp Temp src Pulse Resp SpO2   11/24/22 1216 (!) 146/72 99.1 °F (37.3 °C) Oral 77 18 98 %   11/24/22 0607 (!) 162/53 -- -- (!) 102 -- 90 %   11/24/22 0500 (!) 183/46 99 °F (37.2 °C) Oral 76 -- 99 %   11/23/22 2112 (!) 160/54 99.1 °F (37.3 °C) Oral 72 18 100 %   11/23/22 1719 (!) 158/66 98.6 °F (37 °C) Oral 71 16 98 %        IOP -    Intake/Output Summary (Last 24 hours) at 11/24/2022 1509  Last data filed at 11/23/2022 1700  Gross per 24 hour   Intake --   Output 575 ml   Net -575 ml       General: Resting in bed in no distress on 2 L nasal cannula  HEENT: No frontal or maxillary sinus tenderness, no nasal congestion, no posterior pharyngeal wall erythema or exudate, dry oral mucous membranes  Neck: Supple, no JVD 
elevation, good carotid upstroke and volume  Heart: S1, S2, no gallops, no rubs  Lungs: Clear to auscultation, no rales, no wheezes, good inspiratory effort  Abdomen: Soft, nontender, nondistended, normoactive bowel sounds, no hepatosplenomegaly, no bladder distention percussion  Extremities: No edema x4  Skin: No rash  Joints: No active synovitis  Neuro: Nonfocal      Labs and Data:     CBC, INR  Recent Labs   Lab 11/24/22  0434 11/23/22  0419 11/22/22  1500 11/22/22  0422 11/21/22  0421 11/20/22  0541   WBC 10.5 8.2  --  8.9 9.2 9.3   HGB 9.8* 8.3* 8.9* 6.8* 7.5* 7.3*   * 111*  --  115* 120* 141       BMP, Ca / Mg / Phos  Recent Labs     11/22/22  0422 11/22/22  1500 11/23/22 0419 11/24/22  0434   SODIUM 139  --  143 142   POTASSIUM 4.3  --  4.1 4.2   CHLORIDE 107  --  109 109   CO2 19*  --  22 25   *  --  101* 90*   CREATININE 2.91*  --  2.81* 2.80*   ALBUMIN  --   --   --  1.7*   CALCIUM 7.8*  --  7.7* 8.5   PHOS 3.1  --  3.1 3.4   WBC 8.9  --  8.2 10.5   HGB 6.8* 8.9* 8.3* 9.8*   MCV 99.5  --  97.7 96.7   *  --  111* 126*        UA  No results found    Invalid input(s): SPECGRVURN, NITRTURN, LEUKESTURN, WHTBLCLURN, BACTUR, RBLCLURN    No valid procedures specified.    Echocardiogram  Pending final echo read     Renal Imaging -USG/CT    Renal ultrasound  11/19/2022  FINDINGS: The kidneys are normal in size with increased cortical  echogenicity. There is no hydronephrosis. There is an exophytic simple cyst  arising from the lower pole of the right kidney measuring 2.4 cm in  diameter.  *   Right renal length: 10.8 cm  *   Left renal length: 9 cm     BLADDER: The bladder is collapsed around a Marrufo catheter balloon and  cannot be evaluated.        IMPRESSION:     Increased renal echogenicity which can be seen with medical renal disease.  No hydronephrosis.    Review of Old/Other records:  Reviewed      ASSESSMENT:  Sunita Barajas is a 77 year old female , now admitted 11/18/2022.    1.  
Acute kidney injury - likely prerenal injury due to volume contraction at the nursing facility which was likely exacerbated by use of ARB and diuretics.  Urinary tract infection in the setting of chronic indwelling Chand also likely contributing.  Renal ultrasound was unremarkable for any obstruction/hydronephrosis.  Patient initially responded to IV fluid bolus in ER.  Creatinine is relatively stable with fair urine output    2.  CKD stage IIIb- Baseline creatinine 1.1-1.2, likely due to advanced age, underlying hypertensive nephrosclerosis and diabetic nephropathy.    3.  Hyperkalemia-likely due to severe APPLE along with use of ARB.  Much improved.    4.  Hypernatremia-due to significant free water deficit from poor p.o. intake.  Resolved.    5.  Metabolic acidosis-due to APPLE.  Lactic acid negative    6.  Generalized weakness- multifactorial, including UTI, and dehydration    7.  Urinary tract infection- patient has chronic indwelling Chand catheter.  UA with large LE, positive nitrites, and large bacteria which is different from previous UA in June 2022.  Now on empiric IV antibiotics with IV cefepime.  Pending urine cultures    8.  Facial edema-unclear etiology. ?angioedema. Patient is not on ACEi but is on ARB at nursing home, which can still be associated with facial edema. Pending UPCR to assess for nephrotic range proteinuria     PLAN:   -We will discontinue IV fluids for now.  - Hold ARB and diuretics  - UPCR with 2.1 gram, non-nephrotic range proteinuria. Likely overestimated in setting of chronic indwelling chand catheter as well  - Recommend pharmacy to dose cefepime.  Would avoid large doses of IV cefepime in the setting of renal dysfunction to avoid cefepime neurotoxicity, especially since she is already encephalopathic  - Blood and urine cultures negative so far.  - Avoid any nephrotoxic agents getting NSAIDs and IV contrast    Patient is cleared for discharge from renal standpoint.  Prognosis 
Electrophysiology
guarded.

## 2022-11-29 ENCOUNTER — APPOINTMENT (OUTPATIENT)
Dept: OTOLARYNGOLOGY | Facility: CLINIC | Age: 52
End: 2022-11-29

## 2022-11-29 ENCOUNTER — OUTPATIENT (OUTPATIENT)
Dept: OUTPATIENT SERVICES | Facility: HOSPITAL | Age: 52
LOS: 1 days | Discharge: HOME | End: 2022-11-29

## 2022-11-29 ENCOUNTER — APPOINTMENT (OUTPATIENT)
Dept: MEDICATION MANAGEMENT | Facility: CLINIC | Age: 52
End: 2022-11-29

## 2022-11-29 VITALS — BODY MASS INDEX: 27.46 KG/M2 | WEIGHT: 155 LBS | HEIGHT: 63 IN

## 2022-11-29 DIAGNOSIS — R49.0 DYSPHONIA: ICD-10-CM

## 2022-11-29 DIAGNOSIS — K21.9 GASTRO-ESOPHAGEAL REFLUX DISEASE W/OUT ESOPHAGITIS: ICD-10-CM

## 2022-11-29 DIAGNOSIS — Z79.01 LONG TERM (CURRENT) USE OF ANTICOAGULANTS: ICD-10-CM

## 2022-11-29 DIAGNOSIS — Z95.810 PRESENCE OF AUTOMATIC (IMPLANTABLE) CARDIAC DEFIBRILLATOR: Chronic | ICD-10-CM

## 2022-11-29 DIAGNOSIS — D68.61 ANTIPHOSPHOLIPID SYNDROME: ICD-10-CM

## 2022-11-29 LAB
INR PPP: 5.9 RATIO
POCT-PROTHROMBIN TIME: 71.1 SECS
QUALITY CONTROL: YES

## 2022-11-29 PROCEDURE — 31575 DIAGNOSTIC LARYNGOSCOPY: CPT

## 2022-11-29 PROCEDURE — 99213 OFFICE O/P EST LOW 20 MIN: CPT | Mod: 25

## 2022-11-29 NOTE — HISTORY OF PRESENT ILLNESS
[FreeTextEntry1] : Patient following up today on hoarseness , hypercoagulable state.   Patient states she is doing well.  Her voice is not hoarse.  She had a CT Neck soft tissue done  06/22/22.  Patient is here to review results.

## 2022-11-29 NOTE — ASSESSMENT
[FreeTextEntry1] : Patient advised to continue vocal hygiene and reflux measures.\par \par RTC in 6M

## 2022-12-01 ENCOUNTER — OUTPATIENT (OUTPATIENT)
Dept: OUTPATIENT SERVICES | Facility: HOSPITAL | Age: 52
LOS: 1 days | Discharge: HOME | End: 2022-12-01

## 2022-12-01 ENCOUNTER — APPOINTMENT (OUTPATIENT)
Dept: MEDICATION MANAGEMENT | Facility: CLINIC | Age: 52
End: 2022-12-01

## 2022-12-01 DIAGNOSIS — Z90.710 ACQUIRED ABSENCE OF BOTH CERVIX AND UTERUS: Chronic | ICD-10-CM

## 2022-12-01 DIAGNOSIS — Z90.49 ACQUIRED ABSENCE OF OTHER SPECIFIED PARTS OF DIGESTIVE TRACT: Chronic | ICD-10-CM

## 2022-12-01 DIAGNOSIS — Z95.810 PRESENCE OF AUTOMATIC (IMPLANTABLE) CARDIAC DEFIBRILLATOR: Chronic | ICD-10-CM

## 2022-12-01 DIAGNOSIS — Z98.890 OTHER SPECIFIED POSTPROCEDURAL STATES: Chronic | ICD-10-CM

## 2022-12-01 DIAGNOSIS — D68.61 ANTIPHOSPHOLIPID SYNDROME: ICD-10-CM

## 2022-12-01 DIAGNOSIS — Z79.01 LONG TERM (CURRENT) USE OF ANTICOAGULANTS: ICD-10-CM

## 2022-12-01 DIAGNOSIS — I77.1 STRICTURE OF ARTERY: Chronic | ICD-10-CM

## 2022-12-01 LAB
INR PPP: 2.3 RATIO
POCT-PROTHROMBIN TIME: 28.1 SECS
QUALITY CONTROL: YES

## 2022-12-05 NOTE — ASU PATIENT PROFILE, ADULT - AS SC BRADEN [facility]RICTION
PAST SURGICAL HISTORY:  H/O tracheostomy     PEG (percutaneous endoscopic gastrostomy) status removed     (3) no apparent problem

## 2022-12-08 ENCOUNTER — APPOINTMENT (OUTPATIENT)
Dept: MEDICATION MANAGEMENT | Facility: CLINIC | Age: 52
End: 2022-12-08

## 2022-12-08 ENCOUNTER — OUTPATIENT (OUTPATIENT)
Dept: OUTPATIENT SERVICES | Facility: HOSPITAL | Age: 52
LOS: 1 days | Discharge: HOME | End: 2022-12-08

## 2022-12-08 DIAGNOSIS — Z90.49 ACQUIRED ABSENCE OF OTHER SPECIFIED PARTS OF DIGESTIVE TRACT: Chronic | ICD-10-CM

## 2022-12-08 DIAGNOSIS — Z98.890 OTHER SPECIFIED POSTPROCEDURAL STATES: Chronic | ICD-10-CM

## 2022-12-08 DIAGNOSIS — I77.1 STRICTURE OF ARTERY: Chronic | ICD-10-CM

## 2022-12-08 DIAGNOSIS — Z90.710 ACQUIRED ABSENCE OF BOTH CERVIX AND UTERUS: Chronic | ICD-10-CM

## 2022-12-08 DIAGNOSIS — Z95.810 PRESENCE OF AUTOMATIC (IMPLANTABLE) CARDIAC DEFIBRILLATOR: Chronic | ICD-10-CM

## 2022-12-08 DIAGNOSIS — Z79.01 LONG TERM (CURRENT) USE OF ANTICOAGULANTS: ICD-10-CM

## 2022-12-08 DIAGNOSIS — D68.61 ANTIPHOSPHOLIPID SYNDROME: ICD-10-CM

## 2022-12-09 ENCOUNTER — LABORATORY RESULT (OUTPATIENT)
Age: 52
End: 2022-12-09

## 2022-12-09 ENCOUNTER — APPOINTMENT (OUTPATIENT)
Dept: PAIN MANAGEMENT | Facility: CLINIC | Age: 52
End: 2022-12-09

## 2022-12-09 LAB
INR PPP: 3.1 RATIO
PM COCAINE: NEGATIVE
POCT-PROTHROMBIN TIME: 38.1 SECS
QUALITY CONTROL: YES

## 2022-12-09 PROCEDURE — 80305 DRUG TEST PRSMV DIR OPT OBS: CPT | Mod: QW

## 2022-12-09 NOTE — HISTORY OF PRESENT ILLNESS
[FreeTextEntry1] : 52 year old female with chronic pain presents for repeat UDS due to inconsistency that patient states is lab related.  UDS consistent today.

## 2022-12-13 ENCOUNTER — APPOINTMENT (OUTPATIENT)
Dept: MEDICATION MANAGEMENT | Facility: CLINIC | Age: 52
End: 2022-12-13

## 2022-12-14 NOTE — CONSULT NOTE ADULT - ASSESSMENT
HTN  Bronchospasm with normal chestxray   decrease metoprolol to 25 mg daily  start Hydralazine 25 mg bid one dose now. [facility] and print the 'Prescriptions from this Visit' Report

## 2022-12-16 ENCOUNTER — APPOINTMENT (OUTPATIENT)
Dept: PAIN MANAGEMENT | Facility: CLINIC | Age: 52
End: 2022-12-16

## 2022-12-19 ENCOUNTER — APPOINTMENT (OUTPATIENT)
Age: 52
End: 2022-12-19

## 2023-01-10 ENCOUNTER — APPOINTMENT (OUTPATIENT)
Dept: PAIN MANAGEMENT | Facility: CLINIC | Age: 53
End: 2023-01-10
Payer: MEDICARE

## 2023-01-10 VITALS
SYSTOLIC BLOOD PRESSURE: 164 MMHG | HEART RATE: 89 BPM | HEIGHT: 63 IN | DIASTOLIC BLOOD PRESSURE: 84 MMHG | WEIGHT: 155 LBS | BODY MASS INDEX: 27.46 KG/M2

## 2023-01-10 PROCEDURE — 99213 OFFICE O/P EST LOW 20 MIN: CPT

## 2023-01-10 RX ORDER — MORPHINE SULFATE 15 MG/1
15 TABLET, FILM COATED, EXTENDED RELEASE ORAL TWICE DAILY
Qty: 60 | Refills: 0 | Status: DISCONTINUED | COMMUNITY
Start: 2022-06-29 | End: 2023-01-10

## 2023-01-10 RX ORDER — MORPHINE SULFATE 60 MG/1
60 TABLET, FILM COATED, EXTENDED RELEASE ORAL 3 TIMES DAILY
Qty: 90 | Refills: 0 | Status: DISCONTINUED | COMMUNITY
Start: 2022-06-29 | End: 2023-01-10

## 2023-01-12 NOTE — HISTORY OF PRESENT ILLNESS
[FreeTextEntry1] : ORIGINAL PRESENTATION: Ms. Bauman is a 52-year-old woman who has been a patient in our office since 1996. At that time, she presented with complaints of lower back pain during pregnancy. Ultimately, the patient underwent a right L4-5 laminectomy over 20 years ago by Dr. Andre Carter. However, she still struggles with chronic pain. The patient has been medically managed at that and has trialed various preparations including Lortab, Vicodin, Dilaudid, Morphine, OxyContin, and methadone. She also has multiple complaints of pain secondary to a pacemaker having been placed. She also claims to have a history of throat cancer; however, we do not have any evidence of any cancer diagnosis. She also has a history of connective tissue disorders such as lupus and rheumatoid arthritis.\par \par TODAY: I had the pleasure of seeing Ms. Bauman today in follow up. Her previous history and physical findings have been reviewed.\par \par She is under our care for chronic pain secondary to lupus and lumbar issues which she is receiving continuing active treatment for. She has been having issues with her cardiac stent that she previously had surgery for.  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 She is being treated by a cardiologist in [place] and will let us know the outcome with respect to possible surgery in the future.\par \par In regard to her medication regimen, she is currently on a regimen of MS Contin 60 mg 2 tabs TID in conjunction with morphine IR 15 mg BID. Due to a previous UDS inconsistency with + ANGELA noted, patient will have her UDS sent to [facility] today. Repeat rapid UDS + for BZO / MOP.\par \par SOAPP, done today, 1/10/23 - LOW RISK.

## 2023-01-12 NOTE — ASSESSMENT
[FreeTextEntry1] : 52-year-old female with chronic pain. She has a history of lupus and many ongoing illnesses. Repeat UDS was sent to [facility] today. She will continue with her current medication regimen. She will f/u in 4 weeks for reevaluation.\par

## 2023-01-12 NOTE — DISCUSSION/SUMMARY
[de-identified] : I have consulted the  registry for the purpose of reviewing the patient's controlled substance.\par \par The patient will return to the office in 4 weeks time and is aware to contact me with any question or concerns in the interim.\par \par Stephanie Bhatt PA-C \par Danii Burnette MD\par

## 2023-02-10 ENCOUNTER — APPOINTMENT (OUTPATIENT)
Dept: PAIN MANAGEMENT | Facility: CLINIC | Age: 53
End: 2023-02-10

## 2023-02-15 ENCOUNTER — APPOINTMENT (OUTPATIENT)
Dept: CARDIOLOGY | Facility: CLINIC | Age: 53
End: 2023-02-15
Payer: MEDICARE

## 2023-02-15 ENCOUNTER — NON-APPOINTMENT (OUTPATIENT)
Age: 53
End: 2023-02-15

## 2023-02-15 PROCEDURE — 93295 DEV INTERROG REMOTE 1/2/MLT: CPT

## 2023-02-15 PROCEDURE — 93296 REM INTERROG EVL PM/IDS: CPT

## 2023-02-15 NOTE — REASON FOR VISIT
[Follow-Up Visit] : a follow-up visit for [Spouse] : spouse [Family Member] : family member [FreeTextEntry2] : Antiphospholipid syndroma

## 2023-02-16 ENCOUNTER — LABORATORY RESULT (OUTPATIENT)
Age: 53
End: 2023-02-16

## 2023-02-17 ENCOUNTER — APPOINTMENT (OUTPATIENT)
Dept: PAIN MANAGEMENT | Facility: CLINIC | Age: 53
End: 2023-02-17
Payer: MEDICARE

## 2023-02-17 ENCOUNTER — LABORATORY RESULT (OUTPATIENT)
Age: 53
End: 2023-02-17

## 2023-02-17 ENCOUNTER — RESULT CHARGE (OUTPATIENT)
Age: 53
End: 2023-02-17

## 2023-02-17 ENCOUNTER — OUTPATIENT (OUTPATIENT)
Dept: OUTPATIENT SERVICES | Facility: HOSPITAL | Age: 53
LOS: 1 days | End: 2023-02-17
Payer: MEDICARE

## 2023-02-17 ENCOUNTER — APPOINTMENT (OUTPATIENT)
Dept: HEMATOLOGY ONCOLOGY | Facility: CLINIC | Age: 53
End: 2023-02-17
Payer: MEDICARE

## 2023-02-17 VITALS
HEART RATE: 145 BPM | HEIGHT: 62 IN | DIASTOLIC BLOOD PRESSURE: 90 MMHG | SYSTOLIC BLOOD PRESSURE: 145 MMHG | TEMPERATURE: 97.8 F | OXYGEN SATURATION: 97 % | WEIGHT: 168 LBS | BODY MASS INDEX: 30.91 KG/M2

## 2023-02-17 DIAGNOSIS — Z98.890 OTHER SPECIFIED POSTPROCEDURAL STATES: Chronic | ICD-10-CM

## 2023-02-17 DIAGNOSIS — D68.61 ANTIPHOSPHOLIPID SYNDROME: ICD-10-CM

## 2023-02-17 DIAGNOSIS — I77.1 STRICTURE OF ARTERY: Chronic | ICD-10-CM

## 2023-02-17 DIAGNOSIS — Z90.49 ACQUIRED ABSENCE OF OTHER SPECIFIED PARTS OF DIGESTIVE TRACT: Chronic | ICD-10-CM

## 2023-02-17 DIAGNOSIS — Z95.810 PRESENCE OF AUTOMATIC (IMPLANTABLE) CARDIAC DEFIBRILLATOR: Chronic | ICD-10-CM

## 2023-02-17 DIAGNOSIS — Z90.710 ACQUIRED ABSENCE OF BOTH CERVIX AND UTERUS: Chronic | ICD-10-CM

## 2023-02-17 DIAGNOSIS — R58 HEMORRHAGE, NOT ELSEWHERE CLASSIFIED: ICD-10-CM

## 2023-02-17 DIAGNOSIS — R79.1 ABNORMAL COAGULATION PROFILE: ICD-10-CM

## 2023-02-17 LAB
APTT BLD: 89.7 SEC
HCT VFR BLD CALC: 40.8 %
HGB BLD-MCNC: 13.3 G/DL
INR PPP: 6.76 RATIO
MCHC RBC-ENTMCNC: 28.2 PG
MCHC RBC-ENTMCNC: 32.6 G/DL
MCV RBC AUTO: 86.6 FL
PLATELET # BLD AUTO: 204 K/UL
PMV BLD: 10.1 FL
PT BLD: >40 SEC
RBC # BLD: 4.71 M/UL
RBC # FLD: 12.7 %
WBC # FLD AUTO: 8 K/UL

## 2023-02-17 PROCEDURE — 99214 OFFICE O/P EST MOD 30 MIN: CPT

## 2023-02-17 PROCEDURE — 80305 DRUG TEST PRSMV DIR OPT OBS: CPT | Mod: QW

## 2023-02-17 PROCEDURE — 99212 OFFICE O/P EST SF 10 MIN: CPT

## 2023-02-17 RX ORDER — PHYTONADIONE (VIT K1) 5 MG
2.5 TABLET ORAL DAILY
Refills: 0 | Status: DISCONTINUED | OUTPATIENT
Start: 2023-02-17 | End: 2023-05-19

## 2023-02-17 NOTE — REVIEW OF SYSTEMS
[Fever] : fever [Vision Problems] : vision problems [Dizziness] : dizziness [Anxiety] : anxiety [Hot Flashes] : hot flashes [Muscle Weakness] : muscle weakness [Easy Bleeding] : a tendency for easy bleeding [Easy Bruising] : a tendency for easy bruising [Negative] : Integumentary [FreeTextEntry2] : Low grade [FreeTextEntry3] : Secondary to lupus [FreeTextEntry4] : bleeding from the tongue [de-identified] : From the tongue

## 2023-02-17 NOTE — ASSESSMENT
[FreeTextEntry1] : 52 year old female with \par #antiphospholipid syndrome, currently on Coumadin.\par #positive lupus anticoagulant, being followed by lupus specialist in [facility]\par #history of cervical cancer, S/P hysterectomy, no evidence of disease.\par #history of laryngeal cancer, S/P chemo/RT, being followed by ENT. No evidence of disease for this either.\par #bleeding secondary to high INR\par \par \par Patient was advised to continue to hold Coumadin.\par Vitamin K 2.5 mg PO x 1 today.\par Recommend increased greens for the next couple of days.\par She will monitor INR daily and will not resume Coumadin until INR < or = to 3.\par \par \par Case discussed and patient seen with Dr. Sultana who agreed with the above..\par

## 2023-02-17 NOTE — HISTORY OF PRESENT ILLNESS
[de-identified] : The patient came today with recent severe bleeding, secondary to Coumadin toxicity.\par She developed bleeding from the tongue several days ago, INR was found to be 7.6.\par Coumadin was held for 2 days and dose was adjusted.  \par Meanwhile, she was recently put on fluconazole for nail fungus, which most likely caused the high INR.\par It remains high, 7.9 today. (patient is able to monitor at home)\par She is being monitored also by Dr. Chao and has been on Coumadin for almost 1 year.\par She is being followed by Dr Hanna for hx of laryngeal ca. She was scoped 2 months ago and the examination was "normal".

## 2023-02-17 NOTE — HISTORY OF PRESENT ILLNESS
[FreeTextEntry1] : ORIGINAL PRESENTATION: Ms. Bauman is a 52-year-old woman who has been a patient in our office since 1996. At that time, she presented with complaints of lower back pain during pregnancy. Ultimately, the patient underwent a right L4-5 laminectomy over 20 years ago by Dr. Andre Carter. However, she still struggles with chronic pain. The patient has been medically managed at that and has trialed various preparations including Lortab, Vicodin, Dilaudid, Morphine, OxyContin, and methadone. She also has multiple complaints of pain secondary to a pacemaker having been placed. She also claims to have a history of throat cancer; however, we do not have any evidence of any cancer diagnosis. She also has a history of connective tissue disorders such as lupus and rheumatoid arthritis.\par \par TODAY: Last seen on 01/10/2023 and since then there has been no changes to her condition.\par \par She is under our care for chronic pain secondary to lupus and lumbar issues which she is receiving continuing active treatment for. She has been having issues with her cardiac stent that she previously had surgery for.  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 She is being treated by a cardiologist in [place] and will let us know the outcome with respect to possible surgery in the future.\par \par In regard to her medication regimen, she is currently on a regimen of MS Contin 60 mg 2 tabs TID in conjunction with morphine IR 15 mg BID.\par UDS will be repeated today. \par \par Of note: She was seeing Dr. Sultana today due to uncontrollable bleeding in her mouth/tongue and was started on Vit K.\par \par SOAPP, done 1/10/23 - LOW RISK.

## 2023-02-17 NOTE — PHYSICAL EXAM
[Normal] : affect appropriate [Ambulatory and capable of all self care but unable to carry out any work activities] : Status 2- Ambulatory and capable of all self care but unable to carry out any work activities. Up and about more than 50% of waking hours [de-identified] : But somewhat overweight [de-identified] : Onychomycosis R thumbnail

## 2023-02-17 NOTE — ASSESSMENT
[FreeTextEntry1] : 52-year-old female with chronic pain. She has a history of lupus and many ongoing illnesses. UDS will be repeated today. She will continue with her current medication regimen. She will f/u in 4 weeks for reevaluation.\par

## 2023-02-17 NOTE — DISCUSSION/SUMMARY
[Medication Risks Reviewed] : Medication risks reviewed [de-identified] : I have consulted the  registry for the purpose of reviewing the patient's controlled substance.\par UDS will be repeated today.\par The patient will return to the office in 4 weeks time and is aware to contact me with any question or concerns in the interim.\par \par Corey Melgoza PA-C \par Danii Burnette MD\par

## 2023-02-17 NOTE — REASON FOR VISIT
[Follow-Up Visit] : a follow-up visit for [Spouse] : spouse [Family Member] : family member [FreeTextEntry2] : Antiphospholipid syndrome

## 2023-02-18 DIAGNOSIS — D68.61 ANTIPHOSPHOLIPID SYNDROME: ICD-10-CM

## 2023-02-21 LAB
AMP / AMPHETAMINE: NEGATIVE
BAR / SECOBARBITAL: NEGATIVE
BUP / BUPRENORPHINE: NEGATIVE
BZO / OXAZEPAM: NEGATIVE
COC / COCAINE: NEGATIVE
CREATININE: 100 MG/DL
MDMA / METHYLENEDIOXYMETHAMPHETAMINE: NEGATIVE
MET / METHAMPHETAMINES: NEGATIVE
MOP / MORPHINE: POSITIVE
MTD / METHADONE: NEGATIVE
OXY / OXYCODONE: POSITIVE
PCP / PHENCYCLIDINE: NEGATIVE
PH: 7
SPECIFIC GRAVITY: 1.03
TEMPERATURE: 94 C
THC / MARIJUANA: NEGATIVE

## 2023-02-22 LAB
PM 6 MAM: NEGATIVE NG/ML
PM 7-AMINO-CLONAZ: NEGATIVE NG/ML
PM ALPHA-HYDROXY-ALPRAZOLAM: NEGATIVE NG/ML
PM ALPHA-HYDROXY-MIDAZOLAM: NEGATIVE NG/ML
PM ALPRAZOLAM: 6793 NG/ML
PM AMOBARBITAL: NEGATIVE NG/ML
PM AMPHETAMINE INTERP: NEGATIVE
PM AMPHETAMINE: NEGATIVE NG/ML
PM BARBURATES INTERP: NEGATIVE
PM BEG: NEGATIVE NG/ML
PM BENZODIAZEPINES INTERP: POSITIVE
PM BUPRENORPHINE INTERP: NEGATIVE
PM BUPRENORPHINE: NEGATIVE NG/ML
PM BUTALBITAL: NEGATIVE NG/ML
PM CLONAZEPAM: NEGATIVE NG/ML
PM COCAINE INTERP: NEGATIVE
PM COCAINE: NEGATIVE NG/ML
PM CODIENE: NEGATIVE NG/ML
PM COTININE: >1000 NG/ML
PM DIAZEPAM: NEGATIVE NG/ML
PM DIHYROCODEINE: NEGATIVE NG/ML
PM EDDP: NEGATIVE NG/ML
PM FENTANYL INTERP: NEGATIVE
PM FENTANYL: NEGATIVE NG/ML
PM FLUNITRAZEPAM: NEGATIVE NG/ML
PM FLURAZEPAM: NEGATIVE NG/ML
PM HYDROCODONE: NEGATIVE NG/ML
PM HYDROMORPHONE: 80 NG/ML
PM LORAZEPAM: NEGATIVE NG/ML
PM MARIJUANA (DELTA-9-THC): NEGATIVE NG/ML
PM MARIJUANA INTERP: NEGATIVE
PM MDA: NEGATIVE NG/ML
PM MDEA: NEGATIVE NG/ML
PM MDMA: NEGATIVE NG/ML
PM MEPERIDINE: NEGATIVE NG/ML
PM METHADONE INTERP: NEGATIVE
PM METHADONE: NEGATIVE NG/ML
PM METHAMPHETAMINE: NEGATIVE NG/ML
PM MIDAZOLAM: NEGATIVE NG/ML
PM MORPHINE: >1000 NG/ML
PM NALOXONE: NEGATIVE NG/ML
PM NALTREXONE: NEGATIVE NG/ML
PM NICOTINE INTERP: POSITIVE
PM NORBUPRENORPHINE: NEGATIVE NG/ML
PM NORDIAZEPAM: NEGATIVE NG/ML
PM NORMEPERIDINE: NEGATIVE NG/ML
PM NOROXYCODONE: NEGATIVE NG/ML
PM OPIOID INTERP: POSITIVE
PM OXAZEPAM: NEGATIVE NG/ML
PM OXXYCODONE INTERP: NEGATIVE
PM OXYCODONE: NEGATIVE NG/ML
PM OXYMORPHONE: NEGATIVE NG/ML
PM PCP: NEGATIVE NG/ML
PM PHENCYCLIDINE INTERP: NEGATIVE
PM PHENOBARBITAL: NEGATIVE NG/ML
PM PPX: NEGATIVE NG/ML
PM PROPOXYPHENE INTERP: NEGATIVE
PM SECOBARBITAL: NEGATIVE NG/ML
PM SUFENTANIL: NEGATIVE NG/ML
PM TAPENTADOL: NEGATIVE NG/ML
PM TEMAZEPAM: NEGATIVE NG/ML
PM TRAMADOL INTERP: NEGATIVE
PM TRAMADOL: NEGATIVE NG/ML

## 2023-02-22 RX ORDER — METHYLDOPA/HYDROCHLOROTHIAZIDE 250MG-15MG
100 TABLET ORAL DAILY
Qty: 2 | Refills: 0 | Status: ACTIVE | COMMUNITY
Start: 2023-02-22 | End: 1900-01-01

## 2023-02-23 RX ORDER — PROTHROMBIN TIME TEST STRIPS
STRIP MISCELLANEOUS
Qty: 1 | Refills: 3 | Status: ACTIVE | COMMUNITY
Start: 2023-02-23 | End: 1900-01-01

## 2023-02-24 ENCOUNTER — APPOINTMENT (OUTPATIENT)
Dept: HEMATOLOGY ONCOLOGY | Facility: CLINIC | Age: 53
End: 2023-02-24

## 2023-02-24 ENCOUNTER — OUTPATIENT (OUTPATIENT)
Dept: OUTPATIENT SERVICES | Facility: HOSPITAL | Age: 53
LOS: 1 days | End: 2023-02-24
Payer: MEDICARE

## 2023-02-24 DIAGNOSIS — Z90.49 ACQUIRED ABSENCE OF OTHER SPECIFIED PARTS OF DIGESTIVE TRACT: Chronic | ICD-10-CM

## 2023-02-24 DIAGNOSIS — Z98.890 OTHER SPECIFIED POSTPROCEDURAL STATES: Chronic | ICD-10-CM

## 2023-02-24 DIAGNOSIS — M32.9 SYSTEMIC LUPUS ERYTHEMATOSUS, UNSPECIFIED: ICD-10-CM

## 2023-02-24 DIAGNOSIS — Z90.710 ACQUIRED ABSENCE OF BOTH CERVIX AND UTERUS: Chronic | ICD-10-CM

## 2023-02-24 DIAGNOSIS — D68.61 ANTIPHOSPHOLIPID SYNDROME: ICD-10-CM

## 2023-02-24 DIAGNOSIS — Z95.810 PRESENCE OF AUTOMATIC (IMPLANTABLE) CARDIAC DEFIBRILLATOR: Chronic | ICD-10-CM

## 2023-02-24 DIAGNOSIS — I77.1 STRICTURE OF ARTERY: Chronic | ICD-10-CM

## 2023-02-24 LAB
ALBUMIN SERPL ELPH-MCNC: 4.3 G/DL
ALP BLD-CCNC: 79 U/L
ALT SERPL-CCNC: 10 U/L
APTT BLD: 85.4 SEC
AST SERPL-CCNC: 18 U/L
BILIRUB DIRECT SERPL-MCNC: <0.2 MG/DL
BILIRUB INDIRECT SERPL-MCNC: >0.3 MG/DL
BILIRUB SERPL-MCNC: 0.5 MG/DL
INR PPP: 4.24 RATIO
PROT SERPL-MCNC: 7.1 G/DL
PT BLD: >40 SEC

## 2023-02-24 PROCEDURE — 80076 HEPATIC FUNCTION PANEL: CPT

## 2023-02-24 PROCEDURE — 85610 PROTHROMBIN TIME: CPT

## 2023-02-24 PROCEDURE — 85730 THROMBOPLASTIN TIME PARTIAL: CPT

## 2023-02-24 PROCEDURE — 36415 COLL VENOUS BLD VENIPUNCTURE: CPT

## 2023-02-25 DIAGNOSIS — M32.9 SYSTEMIC LUPUS ERYTHEMATOSUS, UNSPECIFIED: ICD-10-CM

## 2023-02-26 ENCOUNTER — OUTPATIENT (OUTPATIENT)
Dept: OUTPATIENT SERVICES | Facility: HOSPITAL | Age: 53
LOS: 1 days | End: 2023-02-26
Payer: MEDICARE

## 2023-02-26 ENCOUNTER — RESULT REVIEW (OUTPATIENT)
Age: 53
End: 2023-02-26

## 2023-02-26 DIAGNOSIS — Z98.890 OTHER SPECIFIED POSTPROCEDURAL STATES: Chronic | ICD-10-CM

## 2023-02-26 DIAGNOSIS — Z90.49 ACQUIRED ABSENCE OF OTHER SPECIFIED PARTS OF DIGESTIVE TRACT: Chronic | ICD-10-CM

## 2023-02-26 DIAGNOSIS — Z90.710 ACQUIRED ABSENCE OF BOTH CERVIX AND UTERUS: Chronic | ICD-10-CM

## 2023-02-26 DIAGNOSIS — Z95.810 PRESENCE OF AUTOMATIC (IMPLANTABLE) CARDIAC DEFIBRILLATOR: Chronic | ICD-10-CM

## 2023-02-26 DIAGNOSIS — D68.61 ANTIPHOSPHOLIPID SYNDROME: ICD-10-CM

## 2023-02-26 DIAGNOSIS — I77.1 STRICTURE OF ARTERY: Chronic | ICD-10-CM

## 2023-02-26 DIAGNOSIS — Z00.8 ENCOUNTER FOR OTHER GENERAL EXAMINATION: ICD-10-CM

## 2023-02-26 PROCEDURE — 70470 CT HEAD/BRAIN W/O & W/DYE: CPT

## 2023-02-26 PROCEDURE — 70470 CT HEAD/BRAIN W/O & W/DYE: CPT | Mod: 26,MH

## 2023-02-27 DIAGNOSIS — D68.61 ANTIPHOSPHOLIPID SYNDROME: ICD-10-CM

## 2023-03-15 ENCOUNTER — APPOINTMENT (OUTPATIENT)
Dept: PAIN MANAGEMENT | Facility: CLINIC | Age: 53
End: 2023-03-15

## 2023-03-29 ENCOUNTER — APPOINTMENT (OUTPATIENT)
Dept: PAIN MANAGEMENT | Facility: CLINIC | Age: 53
End: 2023-03-29
Payer: MEDICARE

## 2023-03-29 ENCOUNTER — APPOINTMENT (OUTPATIENT)
Dept: PULMONOLOGY | Facility: CLINIC | Age: 53
End: 2023-03-29

## 2023-03-29 VITALS — WEIGHT: 168 LBS | HEIGHT: 62 IN | BODY MASS INDEX: 30.91 KG/M2

## 2023-03-29 PROCEDURE — 99213 OFFICE O/P EST LOW 20 MIN: CPT

## 2023-03-29 NOTE — HISTORY OF PRESENT ILLNESS
[FreeTextEntry1] : ORIGINAL PRESENTATION: Ms. Bauman is a 52-year-old woman who has been a patient in our office since 1996. At that time, she presented with complaints of lower back pain during pregnancy. Ultimately, the patient underwent a right L4-5 laminectomy over 20 years ago by Dr. Andre Carter. However, she still struggles with chronic pain. The patient has been medically managed at that and has trialed various preparations including Lortab, Vicodin, Dilaudid, Morphine, OxyContin, and methadone. She also has multiple complaints of pain secondary to a pacemaker having been placed. She also claims to have a history of throat cancer; however, we do not have any evidence of any cancer diagnosis. She also has a history of connective tissue disorders such as lupus and rheumatoid arthritis.\par \par TODAY: I had the pleasure of seeing Ms. Bauman today in follow up. Her previous history and physical findings have been reviewed.\par \par She is under our care for chronic pain secondary to lupus and lumbar issues which she is receiving continuing active treatment for. Since her last encounter she was hospitalized due to a bleed as a result of a high Coumadin level.  She states her INR was 8 and she was bleeding from her mouth and tongue. The doctor attributed it to stress which can affect her  APS which is a clotting disorder. \par \par In regard to her medication regimen she is currently on a regimen of MS Contin 60 mg 2 tabs TID in conjunction with ms contin er 15 mg bid   She states she feels she needs something for breakthrough and we discussed changing the ER 15 mg to IR which she is agreeable to discussing. She rates her pain a 6-7/10 today, a 6-7/10 at best and a 10/10 at worst.  We will not been lowering her medication as her cardiologist had asked us to consider us increasing her medication.  We will therefore continue as is without change as previous UDS testing was consistent.

## 2023-03-29 NOTE — ASSESSMENT
[FreeTextEntry1] : 52-year-old female with chronic pain. She has a history of lupus and many ongoing illnesses. She will continue with MS Contin 60 mg 2 tabs TID.  We will change her MS contin 15 mg to MSIR 15 mg BID. She will f/u in 4 weeks for re evaluation.\par \par I personally reviewed with the PA, this patient's history and physical exam findings, as documented above. I have discussed the relevant areas of concern, having direct implications to the presenting problems and illnesses, and I have personally examined all pertinent and positive and negative findings, which impact on the prior pain management treatment. \par \par \par Check of the  registry reveals compliance in regards to narcotic medication management use\par \par Briana Tejeda, MS, PA-C\par Danii Burnette MD\par

## 2023-04-21 ENCOUNTER — NON-APPOINTMENT (OUTPATIENT)
Age: 53
End: 2023-04-21

## 2023-04-24 ENCOUNTER — APPOINTMENT (OUTPATIENT)
Dept: PAIN MANAGEMENT | Facility: CLINIC | Age: 53
End: 2023-04-24
Payer: MEDICARE

## 2023-04-24 PROCEDURE — 99213 OFFICE O/P EST LOW 20 MIN: CPT

## 2023-04-24 NOTE — ASSESSMENT
[FreeTextEntry1] : 52-year-old female with chronic pain. She has a history of lupus and APS. She will continue with MS Contin 60 mg 2 tabs TID Morphine Sulfate IR 15 mg BID. She will f/u in 4 weeks for re evaluation.\par \par Overall there is at least a 30-50% reduction in pain with the prescribed analgesics. The patient denies any adverse side effects due to the medication (sleeping disturbance, constipation, sleepiness, hallucinations and/or urination problems).\par  \par Check of the  registry reveals compliance in regards to narcotic medication management use\par \par Steven Diaz PA-C\par Danii Burnette MD\par  Please Encourage po intake, assist with meals and menu selections, provide alternatives PRN.

## 2023-04-24 NOTE — HISTORY OF PRESENT ILLNESS
[FreeTextEntry1] : ORIGINAL PRESENTATION: Ms. Bauman is a 52-year-old woman who has been a patient in our office since 1996. At that time, she presented with complaints of lower back pain during pregnancy. Ultimately, the patient underwent a right L4-5 laminectomy over 20 years ago by Dr. Andre Carter. However, she still struggles with chronic pain. The patient has been medically managed at that and has trialed various preparations including Lortab, Vicodin, Dilaudid, Morphine, OxyContin, and methadone. She also has multiple complaints of pain secondary to a pacemaker having been placed. She also claims to have a history of throat cancer; however, we do not have any evidence of any cancer diagnosis. She also has a history of connective tissue disorders such as lupus and rheumatoid arthritis.\par \par TODAY: I had the pleasure of seeing Ms. Bauman today in follow up. Her previous history and physical findings have been reviewed.\par \par She is under our care for chronic pain secondary to lupus and lumbar issues which she is receiving continuing active treatment for. She was also diagnosed with Antiphospholipid syndrome which unfortunately has led to frequent hospitalizations as of late. She is medically managed with MS Contin 60 mg 2 tabs TID in conjunction with morphine sulfate 15 mg BID. With this medication regimen she is able to function and preform her ADLs more comfortably. We will not been lowering her medication as her cardiologist had asked us to consider us increasing her medication. She wishes to continue as is without change.

## 2023-04-24 NOTE — ASSESSMENT
[FreeTextEntry1] : 52-year-old female with chronic pain. She has a history of lupus and APS. She will continue with MS Contin 60 mg 2 tabs TID Morphine Sulfate IR 15 mg BID. She will f/u in 4 weeks for re evaluation.\par \par Overall there is at least a 30-50% reduction in pain with the prescribed analgesics. The patient denies any adverse side effects due to the medication (sleeping disturbance, constipation, sleepiness, hallucinations and/or urination problems).\par  \par Check of the  registry reveals compliance in regards to narcotic medication management use\par \par Steven Diaz PA-C\par Danii Burnette MD\par

## 2023-05-17 ENCOUNTER — APPOINTMENT (OUTPATIENT)
Dept: CARDIOLOGY | Facility: CLINIC | Age: 53
End: 2023-05-17

## 2023-05-18 ENCOUNTER — APPOINTMENT (OUTPATIENT)
Dept: OBGYN | Facility: CLINIC | Age: 53
End: 2023-05-18

## 2023-06-08 ENCOUNTER — LABORATORY RESULT (OUTPATIENT)
Age: 53
End: 2023-06-08

## 2023-06-08 ENCOUNTER — APPOINTMENT (OUTPATIENT)
Dept: HEMATOLOGY ONCOLOGY | Facility: CLINIC | Age: 53
End: 2023-06-08
Payer: MEDICARE

## 2023-06-08 ENCOUNTER — OUTPATIENT (OUTPATIENT)
Dept: OUTPATIENT SERVICES | Facility: HOSPITAL | Age: 53
LOS: 1 days | End: 2023-06-08
Payer: MEDICARE

## 2023-06-08 VITALS
BODY MASS INDEX: 31.1 KG/M2 | DIASTOLIC BLOOD PRESSURE: 88 MMHG | HEIGHT: 62 IN | TEMPERATURE: 98.2 F | WEIGHT: 169 LBS | HEART RATE: 86 BPM | RESPIRATION RATE: 16 BRPM | SYSTOLIC BLOOD PRESSURE: 138 MMHG

## 2023-06-08 DIAGNOSIS — Z90.49 ACQUIRED ABSENCE OF OTHER SPECIFIED PARTS OF DIGESTIVE TRACT: Chronic | ICD-10-CM

## 2023-06-08 DIAGNOSIS — Z90.710 ACQUIRED ABSENCE OF BOTH CERVIX AND UTERUS: Chronic | ICD-10-CM

## 2023-06-08 DIAGNOSIS — Z98.890 OTHER SPECIFIED POSTPROCEDURAL STATES: Chronic | ICD-10-CM

## 2023-06-08 DIAGNOSIS — I77.1 STRICTURE OF ARTERY: Chronic | ICD-10-CM

## 2023-06-08 DIAGNOSIS — D68.61 ANTIPHOSPHOLIPID SYNDROME: ICD-10-CM

## 2023-06-08 DIAGNOSIS — H20.9 UNSPECIFIED IRIDOCYCLITIS: ICD-10-CM

## 2023-06-08 DIAGNOSIS — Z95.810 PRESENCE OF AUTOMATIC (IMPLANTABLE) CARDIAC DEFIBRILLATOR: Chronic | ICD-10-CM

## 2023-06-08 LAB
ALBUMIN SERPL ELPH-MCNC: 4.5 G/DL
ALP BLD-CCNC: 94 U/L
ALT SERPL-CCNC: 9 U/L
ANION GAP SERPL CALC-SCNC: 12 MMOL/L
AST SERPL-CCNC: 15 U/L
BILIRUB SERPL-MCNC: 0.4 MG/DL
BUN SERPL-MCNC: 21 MG/DL
CALCIUM SERPL-MCNC: 9.7 MG/DL
CHLORIDE SERPL-SCNC: 99 MMOL/L
CO2 SERPL-SCNC: 27 MMOL/L
CREAT SERPL-MCNC: 1 MG/DL
EGFR: 68 ML/MIN/1.73M2
GLUCOSE SERPL-MCNC: 98 MG/DL
HCT VFR BLD CALC: 41.9 %
HGB BLD-MCNC: 13.6 G/DL
MCHC RBC-ENTMCNC: 27.8 PG
MCHC RBC-ENTMCNC: 32.5 G/DL
MCV RBC AUTO: 85.7 FL
PLATELET # BLD AUTO: 218 K/UL
PMV BLD: 11 FL
POTASSIUM SERPL-SCNC: 5 MMOL/L
PROT SERPL-MCNC: 7.5 G/DL
RBC # BLD: 4.89 M/UL
RBC # FLD: 13.2 %
SODIUM SERPL-SCNC: 138 MMOL/L
WBC # FLD AUTO: 7.5 K/UL

## 2023-06-08 PROCEDURE — 99214 OFFICE O/P EST MOD 30 MIN: CPT

## 2023-06-08 PROCEDURE — 36415 COLL VENOUS BLD VENIPUNCTURE: CPT

## 2023-06-08 PROCEDURE — 85610 PROTHROMBIN TIME: CPT

## 2023-06-08 PROCEDURE — 84597 ASSAY OF VITAMIN K: CPT

## 2023-06-08 PROCEDURE — 85027 COMPLETE CBC AUTOMATED: CPT

## 2023-06-08 PROCEDURE — 85730 THROMBOPLASTIN TIME PARTIAL: CPT

## 2023-06-08 PROCEDURE — 80053 COMPREHEN METABOLIC PANEL: CPT

## 2023-06-09 DIAGNOSIS — D68.61 ANTIPHOSPHOLIPID SYNDROME: ICD-10-CM

## 2023-06-09 LAB
APTT BLD: 97.5 SEC
INR PPP: 5.03 RATIO
PT BLD: >40 SEC

## 2023-06-09 NOTE — REASON FOR VISIT
[Follow-Up Visit] : a follow-up visit for [Spouse] : spouse [FreeTextEntry2] : Antiphospholipid syndrome, hematomas

## 2023-06-09 NOTE — PHYSICAL EXAM
[Ambulatory and capable of all self care but unable to carry out any work activities] : Status 2- Ambulatory and capable of all self care but unable to carry out any work activities. Up and about more than 50% of waking hours [Normal] : grossly intact [de-identified] : Somewhat overweight [de-identified] : Mild arthritic changes [de-identified] : Scattered areas of hematomas on arms, legs and trunk [de-identified] : Anxious.

## 2023-06-09 NOTE — HISTORY OF PRESENT ILLNESS
[Disease:__________________________] : Disease: [unfilled] [de-identified] : The patient is coming for her regularly scheduled follow up.\par Recently, she had an INR above 8 and Coumadin was withheld but she developed significant hematomas almost "everywhere".\par She is feeling very tired and has almost diffuse arthralgias. She has not seen her rheumatologist recently.\par In addition, she is getting this sudden "electric shock pain" in the LLE but lasting only  a second or two but recurrent. \par She is due for her mammogram later this month. Her last colonoscopy was about a couple of years ago and apparently there were no polyps.\par She is now on Lexapro for depression.

## 2023-06-09 NOTE — REVIEW OF SYSTEMS
[Fatigue] : fatigue [Vision Problems] : vision problems [SOB on Exertion] : shortness of breath during exertion [Joint Pain] : joint pain [Joint Stiffness] : joint stiffness [Muscle Pain] : muscle pain [Anxiety] : anxiety [Easy Bruising] : a tendency for easy bruising [Negative] : Integumentary [FreeTextEntry3] : Occasional pain and blurriness

## 2023-06-09 NOTE — ASSESSMENT
[FreeTextEntry1] : 1. Coumadin toxicity. \par 2. Antiphospholipid syndrome. The patient has a lupus anticoagulant.\par 3. SLE. This is somewhat confusing since the patient was told by one rheumatologist that she had SLE and another one said that she didn't. The clinical constellation of symptoms favor the diagnosis of lupus.\par 4. History of H & N cancer. Followed by ENT.\par \par We will obtain blood work including a repeat INR/PTT, CMP, CBC, vitamin K level.\par The patient was told to continue to hold the Coumadin for a few more days.\par Repeat the INR in 3 days if today's result is still significantly elevated.\par All questions answered.\par The patient will call for the results of the tests.\par She was recommended following up with his rheumatologist for her arthralgias/arthritis and clarify the issue of SLE vs only antiphospholipid syndrome with lupus anticoagulant.\par \par Further recommendations after the above results are available.\par \par Follow up in 3-6 months and as needed.

## 2023-06-12 ENCOUNTER — APPOINTMENT (OUTPATIENT)
Dept: HEMATOLOGY ONCOLOGY | Facility: CLINIC | Age: 53
End: 2023-06-12

## 2023-06-12 DIAGNOSIS — T45.511A POISONING BY ANTICOAGULANTS, ACCIDENTAL (UNINTENTIONAL), INITIAL ENCOUNTER: ICD-10-CM

## 2023-06-12 DIAGNOSIS — D68.59 OTHER PRIMARY THROMBOPHILIA: ICD-10-CM

## 2023-06-12 DIAGNOSIS — Z85.89 PERSONAL HISTORY OF MALIGNANT NEOPLASM OF OTHER ORGANS AND SYSTEMS: ICD-10-CM

## 2023-06-13 ENCOUNTER — OUTPATIENT (OUTPATIENT)
Dept: OUTPATIENT SERVICES | Facility: HOSPITAL | Age: 53
LOS: 1 days | End: 2023-06-13
Payer: MEDICARE

## 2023-06-13 ENCOUNTER — APPOINTMENT (OUTPATIENT)
Dept: HEMATOLOGY ONCOLOGY | Facility: CLINIC | Age: 53
End: 2023-06-13
Payer: MEDICARE

## 2023-06-13 ENCOUNTER — LABORATORY RESULT (OUTPATIENT)
Age: 53
End: 2023-06-13

## 2023-06-13 DIAGNOSIS — Z90.49 ACQUIRED ABSENCE OF OTHER SPECIFIED PARTS OF DIGESTIVE TRACT: Chronic | ICD-10-CM

## 2023-06-13 DIAGNOSIS — Z85.89 PERSONAL HISTORY OF MALIGNANT NEOPLASM OF OTHER ORGANS AND SYSTEMS: ICD-10-CM

## 2023-06-13 DIAGNOSIS — D68.61 ANTIPHOSPHOLIPID SYNDROME: ICD-10-CM

## 2023-06-13 DIAGNOSIS — Z95.810 PRESENCE OF AUTOMATIC (IMPLANTABLE) CARDIAC DEFIBRILLATOR: Chronic | ICD-10-CM

## 2023-06-13 DIAGNOSIS — Z98.890 OTHER SPECIFIED POSTPROCEDURAL STATES: Chronic | ICD-10-CM

## 2023-06-13 DIAGNOSIS — I77.1 STRICTURE OF ARTERY: Chronic | ICD-10-CM

## 2023-06-13 DIAGNOSIS — Z90.710 ACQUIRED ABSENCE OF BOTH CERVIX AND UTERUS: Chronic | ICD-10-CM

## 2023-06-13 DIAGNOSIS — T45.511A POISONING BY ANTICOAGULANTS, ACCIDENTAL (UNINTENTIONAL), INITIAL ENCOUNTER: ICD-10-CM

## 2023-06-13 DIAGNOSIS — D68.59 OTHER PRIMARY THROMBOPHILIA: ICD-10-CM

## 2023-06-13 PROCEDURE — 85730 THROMBOPLASTIN TIME PARTIAL: CPT | Mod: XU

## 2023-06-13 PROCEDURE — 88112 CYTOPATH CELL ENHANCE TECH: CPT | Mod: 26

## 2023-06-13 PROCEDURE — 85598 HEXAGNAL PHOSPH PLTLT NEUTRL: CPT

## 2023-06-13 PROCEDURE — 88112 CYTOPATH CELL ENHANCE TECH: CPT

## 2023-06-13 PROCEDURE — 81001 URINALYSIS AUTO W/SCOPE: CPT

## 2023-06-13 PROCEDURE — 36415 COLL VENOUS BLD VENIPUNCTURE: CPT

## 2023-06-13 PROCEDURE — 85610 PROTHROMBIN TIME: CPT

## 2023-06-13 PROCEDURE — 85613 RUSSELL VIPER VENOM DILUTED: CPT

## 2023-06-14 ENCOUNTER — INPATIENT (INPATIENT)
Facility: HOSPITAL | Age: 53
LOS: 5 days | Discharge: HOME | DRG: 813 | End: 2023-06-20
Attending: INTERNAL MEDICINE | Admitting: HOSPITALIST
Payer: MEDICARE

## 2023-06-14 VITALS
DIASTOLIC BLOOD PRESSURE: 68 MMHG | SYSTOLIC BLOOD PRESSURE: 127 MMHG | TEMPERATURE: 99 F | RESPIRATION RATE: 112 BRPM | OXYGEN SATURATION: 98 % | HEART RATE: 112 BPM | WEIGHT: 160.06 LBS | HEIGHT: 62 IN

## 2023-06-14 DIAGNOSIS — Z90.710 ACQUIRED ABSENCE OF BOTH CERVIX AND UTERUS: Chronic | ICD-10-CM

## 2023-06-14 DIAGNOSIS — Z90.49 ACQUIRED ABSENCE OF OTHER SPECIFIED PARTS OF DIGESTIVE TRACT: Chronic | ICD-10-CM

## 2023-06-14 DIAGNOSIS — I77.1 STRICTURE OF ARTERY: Chronic | ICD-10-CM

## 2023-06-14 DIAGNOSIS — Z98.890 OTHER SPECIFIED POSTPROCEDURAL STATES: Chronic | ICD-10-CM

## 2023-06-14 DIAGNOSIS — N39.0 URINARY TRACT INFECTION, SITE NOT SPECIFIED: ICD-10-CM

## 2023-06-14 DIAGNOSIS — Z95.810 PRESENCE OF AUTOMATIC (IMPLANTABLE) CARDIAC DEFIBRILLATOR: Chronic | ICD-10-CM

## 2023-06-14 LAB
ANION GAP SERPL CALC-SCNC: 13 MMOL/L — SIGNIFICANT CHANGE UP (ref 7–14)
APPEARANCE UR: ABNORMAL
APPEARANCE: ABNORMAL
APTT BLD: 85.6 SEC — CRITICAL HIGH (ref 27–39.2)
APTT BLD: 91.3 SEC
BACTERIA # UR AUTO: ABNORMAL
BASOPHILS # BLD AUTO: 0.04 K/UL — SIGNIFICANT CHANGE UP (ref 0–0.2)
BASOPHILS NFR BLD AUTO: 0.5 % — SIGNIFICANT CHANGE UP (ref 0–1)
BILIRUB UR-MCNC: ABNORMAL
BILIRUBIN URINE: NEGATIVE
BLD GP AB SCN SERPL QL: SIGNIFICANT CHANGE UP
BLOOD URINE: ABNORMAL
BUN SERPL-MCNC: 13 MG/DL — SIGNIFICANT CHANGE UP (ref 10–20)
CALCIUM SERPL-MCNC: 9.3 MG/DL — SIGNIFICANT CHANGE UP (ref 8.4–10.5)
CHLORIDE SERPL-SCNC: 100 MMOL/L — SIGNIFICANT CHANGE UP (ref 98–110)
CO2 SERPL-SCNC: 26 MMOL/L — SIGNIFICANT CHANGE UP (ref 17–32)
COLOR SPEC: ABNORMAL
COLOR: ABNORMAL
CONFIRM: NORMAL
CREAT SERPL-MCNC: 1.1 MG/DL — SIGNIFICANT CHANGE UP (ref 0.7–1.5)
DIFF PNL FLD: ABNORMAL
DRVVT IMM 1:2 NP PPP: NORMAL
DRVVT SCREEN TO CONFIRM RATIO: 0.97 RATIO
EGFR: 60 ML/MIN/1.73M2 — SIGNIFICANT CHANGE UP
EOSINOPHIL # BLD AUTO: 0.18 K/UL — SIGNIFICANT CHANGE UP (ref 0–0.7)
EOSINOPHIL NFR BLD AUTO: 2.4 % — SIGNIFICANT CHANGE UP (ref 0–8)
EPI CELLS # UR: >27 /HPF — HIGH (ref 0–5)
GLUCOSE QUALITATIVE U: NEGATIVE
GLUCOSE SERPL-MCNC: 92 MG/DL — SIGNIFICANT CHANGE UP (ref 70–99)
GLUCOSE UR QL: NEGATIVE — SIGNIFICANT CHANGE UP
HCT VFR BLD CALC: 38.2 % — SIGNIFICANT CHANGE UP (ref 37–47)
HGB BLD-MCNC: 12.4 G/DL — SIGNIFICANT CHANGE UP (ref 12–16)
HYALINE CASTS # UR AUTO: 18 /LPF — HIGH (ref 0–7)
IMM GRANULOCYTES NFR BLD AUTO: 0.4 % — HIGH (ref 0.1–0.3)
INR BLD: 4.95 RATIO — HIGH (ref 0.65–1.3)
INR PPP: 4.99 RATIO
KETONES UR-MCNC: SIGNIFICANT CHANGE UP
KETONES URINE: NORMAL
LEUKOCYTE ESTERASE UR-ACNC: ABNORMAL
LEUKOCYTE ESTERASE URINE: ABNORMAL
LIDOCAIN IGE QN: 9 U/L — SIGNIFICANT CHANGE UP (ref 7–60)
LYMPHOCYTES # BLD AUTO: 2.18 K/UL — SIGNIFICANT CHANGE UP (ref 1.2–3.4)
LYMPHOCYTES # BLD AUTO: 29.2 % — SIGNIFICANT CHANGE UP (ref 20.5–51.1)
MCHC RBC-ENTMCNC: 28.4 PG — SIGNIFICANT CHANGE UP (ref 27–31)
MCHC RBC-ENTMCNC: 32.5 G/DL — SIGNIFICANT CHANGE UP (ref 32–37)
MCV RBC AUTO: 87.4 FL — SIGNIFICANT CHANGE UP (ref 81–99)
MENADIONE SERPL-MCNC: 0.2 NG/ML
MONOCYTES # BLD AUTO: 0.61 K/UL — HIGH (ref 0.1–0.6)
MONOCYTES NFR BLD AUTO: 8.2 % — SIGNIFICANT CHANGE UP (ref 1.7–9.3)
NEUTROPHILS # BLD AUTO: 4.42 K/UL — SIGNIFICANT CHANGE UP (ref 1.4–6.5)
NEUTROPHILS NFR BLD AUTO: 59.3 % — SIGNIFICANT CHANGE UP (ref 42.2–75.2)
NITRITE UR-MCNC: NEGATIVE — SIGNIFICANT CHANGE UP
NITRITE URINE: NEGATIVE
NRBC # BLD: 0 /100 WBCS — SIGNIFICANT CHANGE UP (ref 0–0)
PH UR: 6 — SIGNIFICANT CHANGE UP (ref 5–8)
PH URINE: 6
PLATELET # BLD AUTO: 211 K/UL — SIGNIFICANT CHANGE UP (ref 130–400)
PMV BLD: 10.9 FL — HIGH (ref 7.4–10.4)
POTASSIUM SERPL-MCNC: 4.1 MMOL/L — SIGNIFICANT CHANGE UP (ref 3.5–5)
POTASSIUM SERPL-SCNC: 4.1 MMOL/L — SIGNIFICANT CHANGE UP (ref 3.5–5)
PROT UR-MCNC: ABNORMAL
PROTEIN URINE: ABNORMAL
PROTHROM AB SERPL-ACNC: >40 SEC — HIGH (ref 9.95–12.87)
PT BLD: >40 SEC
RBC # BLD: 4.37 M/UL — SIGNIFICANT CHANGE UP (ref 4.2–5.4)
RBC # FLD: 13.6 % — SIGNIFICANT CHANGE UP (ref 11.5–14.5)
RBC CASTS # UR COMP ASSIST: 50 /HPF — HIGH (ref 0–4)
SCREEN DRVVT: NORMAL
SILICA CLOTTING TIME INTERPRETATION: NORMAL
SILICA CLOTTING TIME S/C: 1.06 RATIO
SODIUM SERPL-SCNC: 139 MMOL/L — SIGNIFICANT CHANGE UP (ref 135–146)
SP GR SPEC: 1.04 — HIGH (ref 1.01–1.03)
SPECIFIC GRAVITY URINE: 1.03
UROBILINOGEN FLD QL: ABNORMAL
UROBILINOGEN URINE: ABNORMAL
WBC # BLD: 7.46 K/UL — SIGNIFICANT CHANGE UP (ref 4.8–10.8)
WBC # FLD AUTO: 7.46 K/UL — SIGNIFICANT CHANGE UP (ref 4.8–10.8)
WBC UR QL: 210 /HPF — HIGH (ref 0–5)

## 2023-06-14 PROCEDURE — 84443 ASSAY THYROID STIM HORMONE: CPT

## 2023-06-14 PROCEDURE — 86900 BLOOD TYPING SEROLOGIC ABO: CPT

## 2023-06-14 PROCEDURE — 83735 ASSAY OF MAGNESIUM: CPT

## 2023-06-14 PROCEDURE — 85027 COMPLETE CBC AUTOMATED: CPT

## 2023-06-14 PROCEDURE — 99223 1ST HOSP IP/OBS HIGH 75: CPT

## 2023-06-14 PROCEDURE — 80053 COMPREHEN METABOLIC PANEL: CPT

## 2023-06-14 PROCEDURE — 93970 EXTREMITY STUDY: CPT

## 2023-06-14 PROCEDURE — 86850 RBC ANTIBODY SCREEN: CPT

## 2023-06-14 PROCEDURE — 85610 PROTHROMBIN TIME: CPT

## 2023-06-14 PROCEDURE — 80061 LIPID PANEL: CPT

## 2023-06-14 PROCEDURE — 73600 X-RAY EXAM OF ANKLE: CPT | Mod: LT

## 2023-06-14 PROCEDURE — 85025 COMPLETE CBC W/AUTO DIFF WBC: CPT

## 2023-06-14 PROCEDURE — 74177 CT ABD & PELVIS W/CONTRAST: CPT | Mod: 26,MA

## 2023-06-14 PROCEDURE — 94640 AIRWAY INHALATION TREATMENT: CPT

## 2023-06-14 PROCEDURE — 85730 THROMBOPLASTIN TIME PARTIAL: CPT

## 2023-06-14 PROCEDURE — 83036 HEMOGLOBIN GLYCOSYLATED A1C: CPT

## 2023-06-14 PROCEDURE — 93306 TTE W/DOPPLER COMPLETE: CPT

## 2023-06-14 PROCEDURE — 86901 BLOOD TYPING SEROLOGIC RH(D): CPT

## 2023-06-14 PROCEDURE — 99285 EMERGENCY DEPT VISIT HI MDM: CPT | Mod: FS

## 2023-06-14 PROCEDURE — 84100 ASSAY OF PHOSPHORUS: CPT

## 2023-06-14 PROCEDURE — 36415 COLL VENOUS BLD VENIPUNCTURE: CPT

## 2023-06-14 RX ORDER — METHYLPREDNISOLONE 4 MG
500 TABLET ORAL DAILY
Refills: 0 | Status: DISCONTINUED | OUTPATIENT
Start: 2023-06-14 | End: 2023-06-20

## 2023-06-14 RX ORDER — BUDESONIDE AND FORMOTEROL FUMARATE DIHYDRATE 160; 4.5 UG/1; UG/1
2 AEROSOL RESPIRATORY (INHALATION)
Refills: 0 | Status: DISCONTINUED | OUTPATIENT
Start: 2023-06-14 | End: 2023-06-20

## 2023-06-14 RX ORDER — METOPROLOL TARTRATE 50 MG
100 TABLET ORAL DAILY
Refills: 0 | Status: DISCONTINUED | OUTPATIENT
Start: 2023-06-14 | End: 2023-06-20

## 2023-06-14 RX ORDER — FOLIC ACID 0.8 MG
1 TABLET ORAL DAILY
Refills: 0 | Status: DISCONTINUED | OUTPATIENT
Start: 2023-06-14 | End: 2023-06-20

## 2023-06-14 RX ORDER — ALBUTEROL 90 UG/1
2 AEROSOL, METERED ORAL EVERY 6 HOURS
Refills: 0 | Status: DISCONTINUED | OUTPATIENT
Start: 2023-06-14 | End: 2023-06-20

## 2023-06-14 RX ORDER — ASPIRIN/CALCIUM CARB/MAGNESIUM 324 MG
81 TABLET ORAL DAILY
Refills: 0 | Status: DISCONTINUED | OUTPATIENT
Start: 2023-06-14 | End: 2023-06-20

## 2023-06-14 RX ORDER — TIOTROPIUM BROMIDE 18 UG/1
2 CAPSULE ORAL; RESPIRATORY (INHALATION) DAILY
Refills: 0 | Status: DISCONTINUED | OUTPATIENT
Start: 2023-06-14 | End: 2023-06-20

## 2023-06-14 RX ORDER — MORPHINE SULFATE 50 MG/1
4 CAPSULE, EXTENDED RELEASE ORAL ONCE
Refills: 0 | Status: DISCONTINUED | OUTPATIENT
Start: 2023-06-14 | End: 2023-06-14

## 2023-06-14 RX ORDER — MORPHINE SULFATE 50 MG/1
120 CAPSULE, EXTENDED RELEASE ORAL THREE TIMES A DAY
Refills: 0 | Status: DISCONTINUED | OUTPATIENT
Start: 2023-06-14 | End: 2023-06-20

## 2023-06-14 RX ORDER — AZTREONAM 2 G
2000 VIAL (EA) INJECTION EVERY 12 HOURS
Refills: 0 | Status: DISCONTINUED | OUTPATIENT
Start: 2023-06-14 | End: 2023-06-15

## 2023-06-14 RX ORDER — ALPRAZOLAM 0.25 MG
2 TABLET ORAL THREE TIMES A DAY
Refills: 0 | Status: DISCONTINUED | OUTPATIENT
Start: 2023-06-14 | End: 2023-06-20

## 2023-06-14 RX ORDER — ONDANSETRON 8 MG/1
4 TABLET, FILM COATED ORAL ONCE
Refills: 0 | Status: COMPLETED | OUTPATIENT
Start: 2023-06-14 | End: 2023-06-14

## 2023-06-14 RX ORDER — ESCITALOPRAM OXALATE 10 MG/1
10 TABLET, FILM COATED ORAL AT BEDTIME
Refills: 0 | Status: DISCONTINUED | OUTPATIENT
Start: 2023-06-14 | End: 2023-06-20

## 2023-06-14 RX ORDER — PANTOPRAZOLE SODIUM 20 MG/1
40 TABLET, DELAYED RELEASE ORAL
Refills: 0 | Status: DISCONTINUED | OUTPATIENT
Start: 2023-06-14 | End: 2023-06-20

## 2023-06-14 RX ORDER — CEFTRIAXONE 500 MG/1
1000 INJECTION, POWDER, FOR SOLUTION INTRAMUSCULAR; INTRAVENOUS EVERY 24 HOURS
Refills: 0 | Status: DISCONTINUED | OUTPATIENT
Start: 2023-06-15 | End: 2023-06-16

## 2023-06-14 RX ORDER — ACETAMINOPHEN 500 MG
650 TABLET ORAL EVERY 6 HOURS
Refills: 0 | Status: DISCONTINUED | OUTPATIENT
Start: 2023-06-14 | End: 2023-06-20

## 2023-06-14 RX ORDER — HYDRALAZINE HCL 50 MG
50 TABLET ORAL
Refills: 0 | Status: DISCONTINUED | OUTPATIENT
Start: 2023-06-14 | End: 2023-06-20

## 2023-06-14 RX ORDER — ONDANSETRON 8 MG/1
8 TABLET, FILM COATED ORAL EVERY 8 HOURS
Refills: 0 | Status: DISCONTINUED | OUTPATIENT
Start: 2023-06-14 | End: 2023-06-20

## 2023-06-14 RX ADMIN — ESCITALOPRAM OXALATE 10 MILLIGRAM(S): 10 TABLET, FILM COATED ORAL at 21:14

## 2023-06-14 RX ADMIN — Medication 81 MILLIGRAM(S): at 21:13

## 2023-06-14 RX ADMIN — MORPHINE SULFATE 4 MILLIGRAM(S): 50 CAPSULE, EXTENDED RELEASE ORAL at 16:38

## 2023-06-14 RX ADMIN — Medication 100 MILLIGRAM(S): at 21:14

## 2023-06-14 RX ADMIN — MORPHINE SULFATE 4 MILLIGRAM(S): 50 CAPSULE, EXTENDED RELEASE ORAL at 17:08

## 2023-06-14 RX ADMIN — Medication 50 MILLIGRAM(S): at 21:14

## 2023-06-14 RX ADMIN — Medication 1 MILLIGRAM(S): at 21:14

## 2023-06-14 RX ADMIN — Medication 100 MILLIGRAM(S): at 18:37

## 2023-06-14 RX ADMIN — Medication 2 MILLIGRAM(S): at 21:25

## 2023-06-14 RX ADMIN — ONDANSETRON 4 MILLIGRAM(S): 8 TABLET, FILM COATED ORAL at 16:39

## 2023-06-14 RX ADMIN — MORPHINE SULFATE 120 MILLIGRAM(S): 50 CAPSULE, EXTENDED RELEASE ORAL at 21:26

## 2023-06-14 NOTE — ED ADULT NURSE NOTE - NSFALLOOBATTEMPT_ED_ALL_ED
In the next few weeks you may receive a [employer] survey regarding your most recent clinic visit with us.  Please take a few moments out of your day to accurately evaluate your visit.  We strive to provide you with the best medical care. Thank you for your time and we look forward to your next visit.     Results: If you recently had testing performed and your results are normal, we will notify you of your results in a letter. If we need to contact you regarding any abnormal results, we will make 2 attempts to reach you at the number you have listed during your office visit today. If we are unable to reach you, a letter with your results and any further instructions will be mailed to your home.    Refills: If you need a refill of your medication, please contact your pharmacy FIRST. You may have refills on file with them; if not, they will contact our office to refill the prescription on your behalf.     [facility] Hours:  Monday - Thursday: 7:00 am - 6:00 pm  Friday: 7:00 am - 5:00 pm  Saturday: 7:00 am - noon    Please do not hesitate to call our office with any questions or concerns AT [phone].      
No

## 2023-06-14 NOTE — ED PROVIDER NOTE - CLINICAL SUMMARY MEDICAL DECISION MAKING FREE TEXT BOX
51-year-old female history of kidney stones SLE lupus antiphospholipid RA cervical CA status post hysterectomy laryngeal CA status post chemo and radiation CHF AICD pacemaker DVT PE on Coumadin NSTEMI 2015 subclavian stenosis status post stent hypertension hyperlipidemia presenting here for hematuria since Friday along with abdominal pain that is worse on the left upper quadrant fever yesterday 101 also found to have elevated INR as outpatient no vomiting chest pain shortness of breath no black or bloody stools was told by her doctor to come in for evaluation no dysuria vs reviewed labs imaging obtained and reviewed UA + antibiotics given 2/2 fevers at home, ct demonstrated KIDNEYS: Symmetric renal enhancement. No hydronephrosis. Subcentimeter   bilateral renal hypodensities, too small to characterize.

## 2023-06-14 NOTE — H&P ADULT - NSHPPHYSICALEXAM_GEN_ALL_CORE
CONSTITUTIONAL: Well-appearing; well-nourished; in no apparent distress.   EYES: PERRL; EOM intact.   ENT: normal nose; no rhinorrhea; normal pharynx with no tonsillar hypertrophy.   NECK: Supple; non-tender; no cervical lymphadenopathy.   CARDIOVASCULAR: Normal S1, S2; no murmurs, rubs, or gallops. Equal radial pulses  RESPIRATORY: Normal chest excursion with respiration; breath sounds clear and equal bilaterally; no wheezes, rhonchi, or rales.  GI/: Normal bowel sounds; non-distended; non-tender; no palpable organomegaly. No cva ttp  MS: No evidence of trauma or deformity. Normal ROM in all four extremities; non-tender to palpation; distal pulses are normal.   SKIN: Normal for age and race; warm; dry; good turgor; Bruises on both hands and legs   Extremities: +1 edema L>R  NEURO/PSYCH: A & O x 4; grossly unremarkable. mood and manner are appropriate. Grooming and personal hygiene are appropriate.

## 2023-06-14 NOTE — ED ADULT NURSE REASSESSMENT NOTE - NS ED NURSE REASSESS COMMENT FT1
Pt. received alert and oriented x4  and resting comfortably in bed. No respiratory distress noted. Will continue to monitor.

## 2023-06-14 NOTE — ED PROVIDER NOTE - PHYSICAL EXAMINATION
CONSTITUTIONAL: Well-appearing; well-nourished; in no apparent distress.   EYES: PERRL; EOM intact.   ENT: normal nose; no rhinorrhea; normal pharynx with no tonsillar hypertrophy.   NECK: Supple; non-tender; no cervical lymphadenopathy.   CARDIOVASCULAR: Normal S1, S2; no murmurs, rubs, or gallops. Equal radial pulses  RESPIRATORY: Normal chest excursion with respiration; breath sounds clear and equal bilaterally; no wheezes, rhonchi, or rales.  GI/: Normal bowel sounds; non-distended; non-tender; no palpable organomegaly. No cva ttp  MS: No evidence of trauma or deformity. Normal ROM in all four extremities; non-tender to palpation; distal pulses are normal.   SKIN: Normal for age and race; warm; dry; good turgor; no apparent lesions or exudate.   NEURO/PSYCH: A & O x 4; grossly unremarkable. mood and manner are appropriate. Grooming and personal hygiene are appropriate.=

## 2023-06-14 NOTE — H&P ADULT - ASSESSMENT
HPI: 52 year old woman with PMH of renal stones (urology Dr. Hernandez) SLE  lupus with lupus anticoagulant, antiphospholipid syndrome,  rheumatoid arthritis, cervical cancer sp hysterectomy,  laryngeal cancer sp CT, RT, HFpEF (19 EF 63%) s/p AICD+PPM, DVT/PE on coumadin,  NSTEMI in 2015, subclavian stenosis s/p stent placement, HTN, HLD, PPM/AICD, , and recent admission from -10/09 for proximal occlusion of left subclavian vein status post in-stent restenosis presenting to ER for evaluation of hematuria and bruising. Pt sts for the last 5 days has had hematuria (coca cola colored urine) with assoc lower abd pain. + fever yesterday 100.5. Pt notes INR yesterday 5.8 (last coumadin dose 23). No vomiting, chest pain, sob, alterations in bowel habits, syncope, HOLDER. Patient also report bruises on both arms and feet.     In the ER:   VS: /68, , RR 20, T 37 oral, SpO2 98% RA  EKG: Atrial-paced rhythm with prolonged AV conduction  Sig Labs: WBC 7.46, Hgb 12.4, , INR 4.95, Cr 1.1 eGFR 60, UA +ve for Blood, LE, & Joby  Imagin) CT A/P: Partially imaged subcutaneous stranding in the right anterior thigh soft tissue. Correlates with hematoma on physical exam.     In the ER: Aztreonam 2g, Zofran 4     # Hematuria in setting of elevated INR  # B/L LE and UE hematoma in setting of elevated INR  # h/o SLE, APLA syndrome,  VTE and PE  # UTI (simple cystitis)  - Patient clinically and hemodynamically stable on admission except tachycardia  - Patient INR on 23 8, 23 5.8, 4.95 on admission    - EKG at baseline  - On Physical Exam Gross Hematuria, and multiple diffuse bleeding on Legs and Feet   - Labs significant for no elevated WBC, Hgb stable at 12.4, Cr 1.1 at baseline, and UA +ve for Blood, LE, & Joby  - CT A/P Partially imaged subcutaneous stranding in the right anterior thigh soft tissue. Correlates with hematoma on physical exam.   - S/P Aztreonam 2g, Zofran 4 in ER  - INR Target 3-4 monitor PTT, PT, INR   - Hold Coumadin (last coumadin dose 23), Home dose between 2-4 QD based on INRs  - ID consult because of multiple Abx allergies (Previous Cx of Ecoli and Kpneumonia almost pansensitive)   - Started on Ceftriaxone (tolerated previously per ID note)  - Given INR < 5 and dropping, and HD stability, will hold off on vitamin K in light of dropping INR. (non threatening bleed currently)   - Active T/S, 2 18 IV, Trend HH  - Will continue aspirin 81 QD  - Fall precautions   - Zofran, Tylenol, and Home dose morphine 120 TID PRN for pain/nausea control   - Follow up cultures   - Consider Wound Consult for R Anterior Thigh Hematoma (most tender to palpation) if worsens, low threshold to start doxy for cellulitis if worsening     # h/o long-QT syndrome and VT s/p AICD  # HFpEF (19 EF 63%) s/p ppm   # NSTEMI in   # Subclavian stenosis s/p stent placement  # HTN   # HLD  - C/w home meds (Aspirin 81, Hydralazine 50 TID, Mg 500 QD, Toprol  QD)   - TSH, Lipid Profile, A1C in AM   - Patient would benefit from a statin and diuretic (consider GDMT)  - LE duplex L>R foot +1 edema   - Repeat Echo   - Keep Mg >2, K > 4     #COPD not on home O2   #15 PY current smoker   - Inhalers and nebs for COPD  - Refused NRT  - Encouraged to stop smoking     #Chronic pain syndrome  #RA  - MS contin 120 q8hrs     #Anxiety  - Xanax 2mg TID PRN  - Lexapro 10 QD    # DVT prophylaxis - SCD   # GI prophylaxis - Pantoprazole 40 QD   # Diet - DASH/TLC  # Activity Score (AM-PAC) - AAT, Fall Precautions   # Code status - Full   # Disposition - From Home, Med Surg

## 2023-06-14 NOTE — H&P ADULT - ATTENDING COMMENTS
53 YO F w/ a PMH of renal stones, SLE with lupus anticoagulant, antiphospholipid syndrome, RA, cervical CA s/p hysterectomy, laryngeal CA s/p CT/RT, HFpEF s/p AICD+PPM, DVT/PE (coumadin), subclavian stenosis s/p stent placement, HTN, and HLD who presents to the hospital for eval of hematuria for the past x 4 days. Associated w/ vague ABD pain and diffuse bruising. Denies any trauma/falls. Denies any flank pain, black/bloody stools, or hematemesis. ROS is positive for fevers, otherwise negative except as above.     In the ED, INR was 4.9 and UA was positive. Hgb is stable. Pt started on IV ABXs (Aztreonam). CT-AP w/ IV contrast showed area of subcutaneous stranding in the right anterior thigh soft tissue.     FMHx:   -No family Hx of early cardiac death, CAD, asthma, or genetic disorders identified    Physical exam shows pt in NAD. VSS, afebrile, not hypoxic on RA. A&Ox3. Neuro exam without deficits, motor/sensory intact, no dysarthria, no facial asymmetry. Muscle strength/sensation intact. CTA B/L with no W/C/R. RRR, no M/G/R. ABD is soft and non-tender, normoactive BSs. LEs without swelling. Multiple areas of ecchymosis noted throughout extremities. Labs and radiology as above.     Hematuria, likely hemorrhagic cystitis exacerbated by supratherapeutic INR; no sepsis present on admission. FU cultures. IVFs (LR). PRN pain meds. IV ABXs (Aztreonam). Hold Coumadin. Repeat coags in the AM. Active T&S. Transfuse PRN for Hgb goal > 7.     Hx of renal stones, SLE with lupus anticoagulant, antiphospholipid syndrome, RA, cervical CA s/p hysterectomy, laryngeal CA s/p CT/RT, HFpEF s/p AICD+PPM, DVT/PE (coumadin), subclavian stenosis s/p stent placement, HTN, and HLD. Restart home meds, except as stated above. DVT PPX. Inform PCP of pt's admission to hospital. My note supersedes the residents note.     Date seen by Attendin23

## 2023-06-14 NOTE — H&P ADULT - NSHPLABSRESULTS_GEN_ALL_CORE
12.4   7.46  )-----------( 211      ( 2023 16:43 )             38.2     06-14    139  |  100  |  13  ----------------------------<  92  4.1   |  26  |  1.1    Ca    9.3      2023 16:43      PT/INR - ( 2023 16:43 )   PT: >40.00 sec;   INR: 4.95 ratio         PTT - ( 2023 16:43 )  PTT:85.6 sec  Urinalysis Basic - ( 2023 16:40 )    Color: Monica / Appearance: Turbid / S.036 / pH: x  Gluc: x / Ketone: Trace  / Bili: Small / Urobili: 6 mg/dL   Blood: x / Protein: 100 mg/dL / Nitrite: Negative   Leuk Esterase: Large / RBC: 50 /HPF /  /HPF   Sq Epi: x / Non Sq Epi: x / Bacteria: Moderate

## 2023-06-14 NOTE — ED PROVIDER NOTE - ATTENDING APP SHARED VISIT CONTRIBUTION OF CARE
51-year-old female history of kidney stones SLE lupus antiphospholipid RA cervical CA status post hysterectomy laryngeal CA status post chemo and radiation CHF AICD pacemaker DVT PE on Coumadin NSTEMI 2015 subclavian stenosis status post stent hypertension hyperlipidemia presenting here for hematuria since Friday along with abdominal pain that is worse on the left upper quadrant fever yesterday 101 also found to have elevated INR as outpatient no vomiting chest pain shortness of breath no black or bloody stools was told by her doctor to come in for evaluation no dysuria   CONSTITUTIONAL: WA / WN / NAD  HEAD: NCAT  EYES: PERRL; EOMI;   ENT: Normal pharynx; mucous membranes pink/moist, no erythema.  NECK: Supple; no meningeal signs  CARD: RRR; nl S1/S2; no M/R/G.   RESP: Respiratory rate and effort are normal; breath sounds clear and equal bilaterally.  ABD: Soft, +LUQ ttp  MSK/EXT: No gross deformities  SKIN: Warm and dry;  multiple bruising throughout extremities   NEURO: AAOx3  PSYCH: Memory Intact, Normal Affect

## 2023-06-14 NOTE — ED PROVIDER NOTE - OBJECTIVE STATEMENT
51 year old woman with PMH of renal stones (urology Dr. Hernandez) SLE  lupus with lupus anticoagulant, antiphospholipid syndrome,  rheumatoid arthritis, cervical cancer sp hysterectomy,  laryngeal cancer sp CT, RT, CHF s/p AICD+PPM, DVT/PE on coumadin,  NSTEMI in 2015, subclavian stenosis s/p stent placement, HTN, HLD, PPM/AICD, , and recent admission from 09/22-10/09 for proximal occlusion of left subclavian vein status post in-stent restenosis presenting to ER for evaluation. Pt sts for the last 5 days has had hematuria (coca cola colored urine) with assoc lower abd pain. + fever yesterday 100.5. Pt notes INR yesterday 5.8 (last coumadin dose 3 days ago). No vomiting, chest pain, sob, alterations in bowel habits, syncope, HOLDER.

## 2023-06-14 NOTE — ED ADULT NURSE NOTE - OBJECTIVE STATEMENT
C/O Blood in the urine for the past couple of days. patient on coumadin and reporting that her INR was 8 a day ago. also complaining of bruises all over her body. recent blood work done at the [facility]

## 2023-06-15 LAB
A1C WITH ESTIMATED AVERAGE GLUCOSE RESULT: 5.9 % — HIGH (ref 4–5.6)
ALBUMIN SERPL ELPH-MCNC: 4.1 G/DL — SIGNIFICANT CHANGE UP (ref 3.5–5.2)
ALP SERPL-CCNC: 95 U/L — SIGNIFICANT CHANGE UP (ref 30–115)
ALT FLD-CCNC: 7 U/L — SIGNIFICANT CHANGE UP (ref 0–41)
ANION GAP SERPL CALC-SCNC: 11 MMOL/L — SIGNIFICANT CHANGE UP (ref 7–14)
APTT BLD: 85.4 SEC — CRITICAL HIGH (ref 27–39.2)
AST SERPL-CCNC: 13 U/L — SIGNIFICANT CHANGE UP (ref 0–41)
BILIRUB SERPL-MCNC: 0.5 MG/DL — SIGNIFICANT CHANGE UP (ref 0.2–1.2)
BLD GP AB SCN SERPL QL: SIGNIFICANT CHANGE UP
BUN SERPL-MCNC: 12 MG/DL — SIGNIFICANT CHANGE UP (ref 10–20)
CALCIUM SERPL-MCNC: 9.3 MG/DL — SIGNIFICANT CHANGE UP (ref 8.4–10.4)
CHLORIDE SERPL-SCNC: 98 MMOL/L — SIGNIFICANT CHANGE UP (ref 98–110)
CHOLEST SERPL-MCNC: 200 MG/DL — HIGH
CO2 SERPL-SCNC: 28 MMOL/L — SIGNIFICANT CHANGE UP (ref 17–32)
CREAT SERPL-MCNC: 0.9 MG/DL — SIGNIFICANT CHANGE UP (ref 0.7–1.5)
EGFR: 77 ML/MIN/1.73M2 — SIGNIFICANT CHANGE UP
ESTIMATED AVERAGE GLUCOSE: 123 MG/DL — HIGH (ref 68–114)
GLUCOSE SERPL-MCNC: 89 MG/DL — SIGNIFICANT CHANGE UP (ref 70–99)
HCT VFR BLD CALC: 39.3 % — SIGNIFICANT CHANGE UP (ref 37–47)
HDLC SERPL-MCNC: 49 MG/DL — LOW
HGB BLD-MCNC: 12.7 G/DL — SIGNIFICANT CHANGE UP (ref 12–16)
INR BLD: 4.28 RATIO — HIGH (ref 0.65–1.3)
LIPID PNL WITH DIRECT LDL SERPL: 131 MG/DL — HIGH
MAGNESIUM SERPL-MCNC: 1.8 MG/DL — SIGNIFICANT CHANGE UP (ref 1.8–2.4)
MCHC RBC-ENTMCNC: 28.3 PG — SIGNIFICANT CHANGE UP (ref 27–31)
MCHC RBC-ENTMCNC: 32.3 G/DL — SIGNIFICANT CHANGE UP (ref 32–37)
MCV RBC AUTO: 87.7 FL — SIGNIFICANT CHANGE UP (ref 81–99)
NON HDL CHOLESTEROL: 151 MG/DL — HIGH
NRBC # BLD: 0 /100 WBCS — SIGNIFICANT CHANGE UP (ref 0–0)
PLATELET # BLD AUTO: 232 K/UL — SIGNIFICANT CHANGE UP (ref 130–400)
PMV BLD: 11 FL — HIGH (ref 7.4–10.4)
POTASSIUM SERPL-MCNC: 4.3 MMOL/L — SIGNIFICANT CHANGE UP (ref 3.5–5)
POTASSIUM SERPL-SCNC: 4.3 MMOL/L — SIGNIFICANT CHANGE UP (ref 3.5–5)
PROT SERPL-MCNC: 7 G/DL — SIGNIFICANT CHANGE UP (ref 6–8)
PROTHROM AB SERPL-ACNC: >40 SEC — HIGH (ref 9.95–12.87)
RBC # BLD: 4.48 M/UL — SIGNIFICANT CHANGE UP (ref 4.2–5.4)
RBC # FLD: 13.7 % — SIGNIFICANT CHANGE UP (ref 11.5–14.5)
SODIUM SERPL-SCNC: 137 MMOL/L — SIGNIFICANT CHANGE UP (ref 135–146)
TRIGL SERPL-MCNC: 95 MG/DL — SIGNIFICANT CHANGE UP
TSH SERPL-MCNC: 4.32 UIU/ML — HIGH (ref 0.27–4.2)
WBC # BLD: 7.94 K/UL — SIGNIFICANT CHANGE UP (ref 4.8–10.8)
WBC # FLD AUTO: 7.94 K/UL — SIGNIFICANT CHANGE UP (ref 4.8–10.8)

## 2023-06-15 PROCEDURE — 99232 SBSQ HOSP IP/OBS MODERATE 35: CPT

## 2023-06-15 PROCEDURE — 93306 TTE W/DOPPLER COMPLETE: CPT | Mod: 26

## 2023-06-15 RX ORDER — SENNA PLUS 8.6 MG/1
1 TABLET ORAL
Refills: 0 | Status: DISCONTINUED | OUTPATIENT
Start: 2023-06-15 | End: 2023-06-20

## 2023-06-15 RX ORDER — AZTREONAM 2 G
2000 VIAL (EA) INJECTION EVERY 12 HOURS
Refills: 0 | Status: DISCONTINUED | OUTPATIENT
Start: 2023-06-15 | End: 2023-06-15

## 2023-06-15 RX ORDER — POLYETHYLENE GLYCOL 3350 17 G/17G
17 POWDER, FOR SOLUTION ORAL DAILY
Refills: 0 | Status: DISCONTINUED | OUTPATIENT
Start: 2023-06-15 | End: 2023-06-20

## 2023-06-15 RX ADMIN — Medication 1 MILLIGRAM(S): at 14:31

## 2023-06-15 RX ADMIN — Medication 500 MILLIGRAM(S): at 14:30

## 2023-06-15 RX ADMIN — Medication 2 MILLIGRAM(S): at 06:00

## 2023-06-15 RX ADMIN — Medication 2 MILLIGRAM(S): at 22:05

## 2023-06-15 RX ADMIN — Medication 100 MILLIGRAM(S): at 04:10

## 2023-06-15 RX ADMIN — Medication 81 MILLIGRAM(S): at 14:30

## 2023-06-15 RX ADMIN — MORPHINE SULFATE 120 MILLIGRAM(S): 50 CAPSULE, EXTENDED RELEASE ORAL at 16:38

## 2023-06-15 RX ADMIN — CEFTRIAXONE 100 MILLIGRAM(S): 500 INJECTION, POWDER, FOR SOLUTION INTRAMUSCULAR; INTRAVENOUS at 14:33

## 2023-06-15 RX ADMIN — TIOTROPIUM BROMIDE 2 PUFF(S): 18 CAPSULE ORAL; RESPIRATORY (INHALATION) at 08:35

## 2023-06-15 RX ADMIN — ONDANSETRON 8 MILLIGRAM(S): 8 TABLET, FILM COATED ORAL at 15:11

## 2023-06-15 RX ADMIN — ONDANSETRON 8 MILLIGRAM(S): 8 TABLET, FILM COATED ORAL at 05:54

## 2023-06-15 RX ADMIN — Medication 50 MILLIGRAM(S): at 05:54

## 2023-06-15 RX ADMIN — PANTOPRAZOLE SODIUM 40 MILLIGRAM(S): 20 TABLET, DELAYED RELEASE ORAL at 06:01

## 2023-06-15 RX ADMIN — Medication 50 MILLIGRAM(S): at 17:32

## 2023-06-15 RX ADMIN — Medication 100 MILLIGRAM(S): at 05:53

## 2023-06-15 RX ADMIN — SENNA PLUS 1 TABLET(S): 8.6 TABLET ORAL at 21:59

## 2023-06-15 RX ADMIN — ONDANSETRON 8 MILLIGRAM(S): 8 TABLET, FILM COATED ORAL at 22:00

## 2023-06-15 RX ADMIN — ESCITALOPRAM OXALATE 10 MILLIGRAM(S): 10 TABLET, FILM COATED ORAL at 22:00

## 2023-06-15 RX ADMIN — MORPHINE SULFATE 120 MILLIGRAM(S): 50 CAPSULE, EXTENDED RELEASE ORAL at 06:15

## 2023-06-15 RX ADMIN — SENNA PLUS 1 TABLET(S): 8.6 TABLET ORAL at 14:31

## 2023-06-15 NOTE — PATIENT PROFILE ADULT - FUNCTIONAL SCREEN CURRENT LEVEL: SWALLOWING (IF SCORE 2 OR MORE FOR ANY ITEM, CONSULT REHAB SERVICES), MLM)
January 31, 2018      Hollis Rice III, MD  [address]  [place] [address]           [facility]  [address]  [place] [address]  Phone: [phone]          Patient: Francisco May II   MR Number: [id number]   YOB: 1982   Date of Visit: 1/31/2018       Dear Dr. Hollis Rice III:    Thank you for referring Francisco May to me for evaluation. Attached you will find relevant portions of my assessment and plan of care.    If you have questions, please do not hesitate to call me. I look forward to following Francisco May along with you.    Sincerely,    Kavita Whitmore, NP    Enclosure  CC:  No Recipients    If you would like to receive this communication electronically, please contact [email or web address] or [phone] to request more information on [employer] Link access.    For providers and/or their staff who would like to refer a patient to [facility], please contact us through our one-stop-shop provider referral line, [facility] , at [phone].    If you feel you have received this communication in error or would no longer like to receive these types of communications, please e-mail [email or web address]         
0 = swallows foods/liquids without difficulty

## 2023-06-15 NOTE — PROGRESS NOTE ADULT - SUBJECTIVE AND OBJECTIVE BOX
ILEANA MCNEIL  52y  [facility]      Patient is a 52y old  Female who presents with a chief complaint of Hematuria + Bruising (14 Jun 2023 20:18)      INTERVAL HPI/OVERNIGHT EVENTS:        REVIEW OF SYSTEMS:        FAMILY HISTORY:  Family history of cervical cancer    FH: thyroid cancer      T(C): 36.3 (06-14-23 @ 23:35), Max: 37 (06-14-23 @ 15:23)  HR: 69 (06-15-23 @ 06:01) (20 - 112)  BP: 116/55 (06-15-23 @ 06:01) (109/52 - 151/67)  RR: 18 (06-15-23 @ 06:01) (18 - 112)  SpO2: 98% (06-15-23 @ 06:01) (95% - 98%)  Wt(kg): --Vital Signs Last 24 Hrs  T(C): 36.3 (14 Jun 2023 23:35), Max: 37 (14 Jun 2023 15:23)  T(F): 97.3 (14 Jun 2023 23:35), Max: 98.6 (14 Jun 2023 15:23)  HR: 69 (15 Geovani 2023 06:01) (20 - 112)  BP: 116/55 (15 Geovani 2023 06:01) (109/52 - 151/67)  BP(mean): 97 (14 Jun 2023 21:14) (75 - 97)  RR: 18 (15 Geovani 2023 06:01) (18 - 112)  SpO2: 98% (15 Geovani 2023 06:01) (95% - 98%)    Parameters below as of 15 Geovani 2023 06:01  Patient On (Oxygen Delivery Method): room air        PHYSICAL EXAM:  GENERAL: NAD, well-groomed, well-developed  HEAD:  Atraumatic, Normocephalic  EYES: EOMI, PERRLA, conjunctiva and sclera clear  ENMT: No tonsillar erythema, exudates, or enlargement; Moist mucous membranes, Good dentition, No lesions  NECK: Supple, No JVD, Normal thyroid  NERVOUS SYSTEM:  Alert & Oriented X3, Good concentration; Motor Strength 5/5 B/L upper and lower extremities; DTRs 2+ intact and symmetric  PULM: Clear to auscultation bilaterally  CARDIAC: Regular rate and rhythm; No murmurs, rubs, or gallops  GI: Soft, Nontender, Nondistended; Bowel sounds present  EXTREMITIES:  2+ Peripheral Pulses, No clubbing, cyanosis, or edema  LYMPH: No lymphadenopathy noted  SKIN: No rashes or lesions    Consultant(s) Notes Reviewed:  [x ] YES  [ ] NO  Care Discussed with Consultants/Other Providers [ x] YES  [ ] NO    LABS:                            12.7   7.94  )-----------( 232      ( 15 Geovani 2023 07:40 )             39.3   06-14    139  |  100  |  13  ----------------------------<  92  4.1   |  26  |  1.1    Ca    9.3      14 Jun 2023 16:43              acetaminophen     Tablet .. 650 milliGRAM(s) Oral every 6 hours PRN  albuterol    90 MICROgram(s) HFA Inhaler 2 Puff(s) Inhalation every 6 hours PRN  ALPRAZolam 2 milliGRAM(s) Oral three times a day PRN  aspirin enteric coated 81 milliGRAM(s) Oral daily  aztreonam  IVPB 2000 milliGRAM(s) IV Intermittent every 12 hours  budesonide  80 MICROgram(s)/formoterol 4.5 MICROgram(s) Inhaler 2 Puff(s) Inhalation two times a day  cefTRIAXone   IVPB 1000 milliGRAM(s) IV Intermittent every 24 hours  escitalopram 10 milliGRAM(s) Oral at bedtime  folic acid 1 milliGRAM(s) Oral daily  hydrALAZINE 50 milliGRAM(s) Oral two times a day  magnesium gluconate 500 milliGRAM(s) Oral daily  metoprolol succinate  milliGRAM(s) Oral daily  morphine ER Tablet 120 milliGRAM(s) Oral three times a day PRN  ondansetron   Disintegrating Tablet 8 milliGRAM(s) Oral every 8 hours  pantoprazole    Tablet 40 milliGRAM(s) Oral before breakfast  tiotropium 2.5 MICROgram(s) Inhaler 2 Puff(s) Inhalation daily      52 year old woman with PMH of renal stones (urology Dr. Hernandez) SLE  lupus with lupus anticoagulant, antiphospholipid syndrome,  rheumatoid arthritis, cervical cancer sp hysterectomy,  laryngeal cancer sp CT, RT, HFpEF (06/21/19 EF 63%) s/p AICD+PPM, DVT/PE on coumadin,  NSTEMI in 2015, subclavian stenosis s/p stent placement, HTN, HLD, PPM/AICD, , and recent admission from 09/22-10/09 for proximal occlusion of left subclavian vein status post in-stent restenosis presenting to ER for evaluation of hematuria and bruising. Pt sts for the last 5 days has had hematuria (c[employer] colored urine) with assoc lower abd pain. + fever yesterday 100.5. Pt notes INR yesterday 5.8 (last coumadin dose 06/11/23). No vomiting, chest pain, sob, alterations in bowel habits, syncope, HOLDER. Patient also report bruises on both arms and feet.       1. Coumadin toxicity complicated by gross hematuria and LE and UE hematoma .h/o SLE, APLA syndrome,  VTE and PE  - Admit to medicine             -ECG: WNL        - INR elevated        - Type and screen   - Hold Coumadin (last coumadin dose 06/11/23), Home dose between 2-4 QD based on INRs  - Patient clinically and hemodynamically stable on admission except tachycardia  - Patient INR on 06/04/23 8, 06/13/23 5.8, 4.95 on admission    - On Physical Exam Gross Hematuria, and multiple diffuse bleeding on Legs and Feet   - Labs significant for no elevated WBC, Hgb stable at 12.4, Cr 1.1 at baseline, and UA +ve for Blood, LE, & Joby  - CT A/P:   a)  Partially imaged subcutaneous stranding in the right anterior thigh soft tissue. Correlates with hematoma on physical exam.       2. Acute UTI   - Switch aztreonam to rocephin   - Urine cx:pending   - No need for ID consult     3.  h/o long-QT syndrome and VT s/p AICD/HFpEF (06/21/19 EF 63%) s/p ppm / NSTEMI in 2015./Subclavian stenosis s/p stent placement    4. HTN / HLD  - C/w home meds (Aspirin 81, Hydralazine 50 TID, Mg 500 QD, Toprol  QD)   - TSH, Lipid Profile, A1C in AM   - Patient would benefit from a statin and diuretic (consider GDMT)  - LE duplex L>R foot +1 edema :pending   - Keep Mg >2, K > 4     5. COPD not on home O2 /15 PY current smoker   - Inhalers and nebs for COPD  - Refused NRT  - Encouraged to stop smoking     #Chronic pain syndrome  #RA  - MS contin 120 q8hrs     #Anxiety  - Xanax 2mg TID PRN  - Lexapro 10 QD    # DVT prophylaxis - SCD   # GI prophylaxis - Pantoprazole 40 QD   # Diet - DASH/TLC  # Activity Score (AM-PAC) - AAT, Fall Precautions   # Code status - Full   # Disposition - From Home, Med Surg      EWAILEANA  52y  [facility]      Patient is a 52y old  Female who presents with a chief complaint of Hematuria + Bruising (14 Jun 2023 20:18)      INTERVAL HPI/OVERNIGHT EVENTS:    patient feels well but complaining of pain at the site of the hematoma and abdomen   her abdomen is soft and non-tender  no other events  still with mild gross hematuria         FAMILY HISTORY:  Family history of cervical cancer    FH: thyroid cancer      T(C): 36.3 (06-14-23 @ 23:35), Max: 37 (06-14-23 @ 15:23)  HR: 69 (06-15-23 @ 06:01) (20 - 112)  BP: 116/55 (06-15-23 @ 06:01) (109/52 - 151/67)  RR: 18 (06-15-23 @ 06:01) (18 - 112)  SpO2: 98% (06-15-23 @ 06:01) (95% - 98%)  Wt(kg): --Vital Signs Last 24 Hrs  T(C): 36.3 (14 Jun 2023 23:35), Max: 37 (14 Jun 2023 15:23)  T(F): 97.3 (14 Jun 2023 23:35), Max: 98.6 (14 Jun 2023 15:23)  HR: 69 (15 Geovani 2023 06:01) (20 - 112)  BP: 116/55 (15 Geovani 2023 06:01) (109/52 - 151/67)  BP(mean): 97 (14 Jun 2023 21:14) (75 - 97)  RR: 18 (15 Geovani 2023 06:01) (18 - 112)  SpO2: 98% (15 Geovani 2023 06:01) (95% - 98%)    Parameters below as of 15 Geovani 2023 06:01  Patient On (Oxygen Delivery Method): room air        PHYSICAL EXAM:  GENERAL: NAD, well-groomed, well-developed  NERVOUS SYSTEM:  Alert & Oriented X3, Good concentration; Motor Strength 5/5 B/L upper and lower extremities; DTRs 2+ intact and symmetric  PULM: Clear to auscultation bilaterally  CARDIAC: Regular rate and rhythm; No murmurs, rubs, or gallops  GI: Soft, Nontender, Nondistended; Bowel sounds present  EXTREMITIES:  multiple ecchymosis noted       Consultant(s) Notes Reviewed:  [x ] YES  [ ] NO  Care Discussed with Consultants/Other Providers [ x] YES  [ ] NO    LABS:                            12.7   7.94  )-----------( 232      ( 15 Geovani 2023 07:40 )             39.3   06-14    139  |  100  |  13  ----------------------------<  92  4.1   |  26  |  1.1    Ca    9.3      14 Jun 2023 16:43              acetaminophen     Tablet .. 650 milliGRAM(s) Oral every 6 hours PRN  albuterol    90 MICROgram(s) HFA Inhaler 2 Puff(s) Inhalation every 6 hours PRN  ALPRAZolam 2 milliGRAM(s) Oral three times a day PRN  aspirin enteric coated 81 milliGRAM(s) Oral daily  aztreonam  IVPB 2000 milliGRAM(s) IV Intermittent every 12 hours  budesonide  80 MICROgram(s)/formoterol 4.5 MICROgram(s) Inhaler 2 Puff(s) Inhalation two times a day  cefTRIAXone   IVPB 1000 milliGRAM(s) IV Intermittent every 24 hours  escitalopram 10 milliGRAM(s) Oral at bedtime  folic acid 1 milliGRAM(s) Oral daily  hydrALAZINE 50 milliGRAM(s) Oral two times a day  magnesium gluconate 500 milliGRAM(s) Oral daily  metoprolol succinate  milliGRAM(s) Oral daily  morphine ER Tablet 120 milliGRAM(s) Oral three times a day PRN  ondansetron   Disintegrating Tablet 8 milliGRAM(s) Oral every 8 hours  pantoprazole    Tablet 40 milliGRAM(s) Oral before breakfast  tiotropium 2.5 MICROgram(s) Inhaler 2 Puff(s) Inhalation daily      52 year old woman with PMH of renal stones (urology Dr. Hernandez) SLE  lupus with lupus anticoagulant, antiphospholipid syndrome,  rheumatoid arthritis, cervical cancer sp hysterectomy,  laryngeal cancer sp CT, RT, HFpEF (06/21/19 EF 63%) s/p AICD+PPM, DVT/PE on coumadin,  NSTEMI in 2015, subclavian stenosis s/p stent placement, HTN, HLD, PPM/AICD, , and recent admission from 09/22-10/09 for proximal occlusion of left subclavian vein status post in-stent restenosis presenting to ER for evaluation of hematuria and bruising. Pt sts for the last 5 days has had hematuria (c[employer] col[employer] colored urine) with assoc lower abd pain. + fever yesterday 100.5. Pt notes INR yesterday 5.8 (last coumadin dose 06/11/23). No vomiting, chest pain, sob, alterations in bowel habits, syncope, HOLDER. Patient also report bruises on both arms and feet.       1. Coumadin toxicity complicated by gross hematuria and LE and UE hematoma .h/o SLE, APLA syndrome,  VTE and PE  - Admit to medicine             -ECG: WNL        - INR elevated        - Type and screen   - Hold Coumadin (last coumadin dose 06/11/23), Home dose between 2-4 QD based on INRs  - Patient clinically and hemodynamically stable on admission except tachycardia  - Patient INR on 06/04/23 8, 06/13/23 5.8, 4.95 on admission    - On Physical Exam Gross Hematuria, and multiple diffuse bleeding on Legs and Feet   - Labs significant for no elevated WBC, Hgb stable at 12.4, Cr 1.1 at baseline, and UA +ve for Blood, LE, & Joby  - CT A/P:   a)  Partially imaged subcutaneous stranding in the right anterior thigh soft tissue. Correlates with hematoma on physical exam.     2. Acute UTI   - Switch aztreonam to rocephin   - Urine cx:pending   - No need for ID consult     3.  h/o long-QT syndrome and VT s/p AICD/HFpEF (06/21/19 EF 63%) s/p ppm / NSTEMI in 2015./Subclavian stenosis s/p stent placement    4. HTN / HLD  - C/w home meds (Aspirin 81, Hydralazine 50 TID, Mg 500 QD, Toprol  QD)   - TSH, Lipid Profile, A1C in AM   - Patient would benefit from a statin and diuretic (consider GDMT)  - LE duplex L>R foot +1 edema :pending   - Keep Mg >2, K > 4     5. COPD not on home O2 /15 PY current smoker   - Inhalers and nebs for COPD  - Refused NRT  - Encouraged to stop smoking     6.Chronic pain syndrome/RA  - MS contin 120 q8hrs       -Add laxative     7.Anxiety  - Xanax 2mg TID PRN  - Lexapro 10 QD    # DVT prophylaxis - SCD   # GI prophylaxis - Pantoprazole 40 QD   # Diet - DASH/TLC  # Activity Score (AM-PAC) - AAT, Fall Precautions   # Code status - Full   # Disposition - From Home, Med Surg

## 2023-06-15 NOTE — CONSULT NOTE ADULT - ASSESSMENT
ASSESSMENT  52 year old woman with PMH of renal stones (urology Dr. Hernandez) SLE  lupus with lupus anticoagulant, antiphospholipid syndrome,  rheumatoid arthritis, cervical cancer sp hysterectomy,  laryngeal cancer sp CT, RT, HFpEF (06/21/19 EF 63%) s/p AICD+PPM, DVT/PE on coumadin,  NSTEMI in 2015, subclavian stenosis s/p stent placement, HTN, HLD, PPM/AICD, , and recent admission from 09/22-10/09 for proximal occlusion of left subclavian vein status post in-stent restenosis presenting to ER for evaluation of hematuria and bruising.       IMPRESSION  #No clinical symptoms to suggest UTI  Hematuria is in the setting of an elevated INR  UA is a contaminant , many epith, disregard  CT Partially imaged subcutaneous stranding in the right anterior thigh soft   tissue. Correlation with physical examination recommended  - has ecchymoses , no evidence of cellulitis   #Sepsis ruled out on admission   #Atelectasis   #Abx allergy: Avelox (Other)  Vantin (Other (Mild))      Creatinine, Serum: 0.9 (06-15-23 @ 07:40)    Weight (kg): 72.6 (06-14-23 @ 15:23)    RECOMMENDATIONS  - Monitor off antibiotics , asymptomatic   - Please recall ID PRN. Please inform ID of any patient clinical change or any new pertinent laboratory or radiographic data      If any questions, please send a message or call on [email or web address] Teams  Please continue to update ID with any pertinent new laboratory or radiographic findings  #[id number]

## 2023-06-16 ENCOUNTER — APPOINTMENT (OUTPATIENT)
Dept: PULMONOLOGY | Facility: CLINIC | Age: 53
End: 2023-06-16

## 2023-06-16 LAB
ALBUMIN SERPL ELPH-MCNC: 3.5 G/DL — SIGNIFICANT CHANGE UP (ref 3.5–5.2)
ALP SERPL-CCNC: 88 U/L — SIGNIFICANT CHANGE UP (ref 30–115)
ALT FLD-CCNC: 6 U/L — SIGNIFICANT CHANGE UP (ref 0–41)
ANION GAP SERPL CALC-SCNC: 11 MMOL/L — SIGNIFICANT CHANGE UP (ref 7–14)
APTT HEX PL PPP: NEGATIVE
AST SERPL-CCNC: 12 U/L — SIGNIFICANT CHANGE UP (ref 0–41)
BASOPHILS # BLD AUTO: 0.04 K/UL — SIGNIFICANT CHANGE UP (ref 0–0.2)
BASOPHILS NFR BLD AUTO: 0.6 % — SIGNIFICANT CHANGE UP (ref 0–1)
BILIRUB SERPL-MCNC: 0.3 MG/DL — SIGNIFICANT CHANGE UP (ref 0.2–1.2)
BUN SERPL-MCNC: 13 MG/DL — SIGNIFICANT CHANGE UP (ref 10–20)
CALCIUM SERPL-MCNC: 8.8 MG/DL — SIGNIFICANT CHANGE UP (ref 8.4–10.4)
CHLORIDE SERPL-SCNC: 99 MMOL/L — SIGNIFICANT CHANGE UP (ref 98–110)
CO2 SERPL-SCNC: 26 MMOL/L — SIGNIFICANT CHANGE UP (ref 17–32)
CREAT SERPL-MCNC: 0.9 MG/DL — SIGNIFICANT CHANGE UP (ref 0.7–1.5)
CULTURE RESULTS: SIGNIFICANT CHANGE UP
EGFR: 77 ML/MIN/1.73M2 — SIGNIFICANT CHANGE UP
EOSINOPHIL # BLD AUTO: 0.29 K/UL — SIGNIFICANT CHANGE UP (ref 0–0.7)
EOSINOPHIL NFR BLD AUTO: 4.1 % — SIGNIFICANT CHANGE UP (ref 0–8)
GLUCOSE SERPL-MCNC: 102 MG/DL — HIGH (ref 70–99)
HCT VFR BLD CALC: 32.9 % — LOW (ref 37–47)
HEX-1: 55.7 SECS
HEX-2: 51.4 SECS
HGB BLD-MCNC: 10.6 G/DL — LOW (ref 12–16)
IMM GRANULOCYTES NFR BLD AUTO: 0.4 % — HIGH (ref 0.1–0.3)
INR BLD: 4.97 RATIO — HIGH (ref 0.65–1.3)
LYMPHOCYTES # BLD AUTO: 2.64 K/UL — SIGNIFICANT CHANGE UP (ref 1.2–3.4)
LYMPHOCYTES # BLD AUTO: 37.7 % — SIGNIFICANT CHANGE UP (ref 20.5–51.1)
MAGNESIUM SERPL-MCNC: 1.8 MG/DL — SIGNIFICANT CHANGE UP (ref 1.8–2.4)
MCHC RBC-ENTMCNC: 28 PG — SIGNIFICANT CHANGE UP (ref 27–31)
MCHC RBC-ENTMCNC: 32.2 G/DL — SIGNIFICANT CHANGE UP (ref 32–37)
MCV RBC AUTO: 86.8 FL — SIGNIFICANT CHANGE UP (ref 81–99)
MONOCYTES # BLD AUTO: 0.62 K/UL — HIGH (ref 0.1–0.6)
MONOCYTES NFR BLD AUTO: 8.8 % — SIGNIFICANT CHANGE UP (ref 1.7–9.3)
NEUTROPHILS # BLD AUTO: 3.39 K/UL — SIGNIFICANT CHANGE UP (ref 1.4–6.5)
NEUTROPHILS NFR BLD AUTO: 48.4 % — SIGNIFICANT CHANGE UP (ref 42.2–75.2)
NRBC # BLD: 0 /100 WBCS — SIGNIFICANT CHANGE UP (ref 0–0)
PLATELET # BLD AUTO: 187 K/UL — SIGNIFICANT CHANGE UP (ref 130–400)
PMV BLD: 11.1 FL — HIGH (ref 7.4–10.4)
POTASSIUM SERPL-MCNC: 3.7 MMOL/L — SIGNIFICANT CHANGE UP (ref 3.5–5)
POTASSIUM SERPL-SCNC: 3.7 MMOL/L — SIGNIFICANT CHANGE UP (ref 3.5–5)
PROT SERPL-MCNC: 5.9 G/DL — LOW (ref 6–8)
PROTHROM AB SERPL-ACNC: >40 SEC — HIGH (ref 9.95–12.87)
RBC # BLD: 3.79 M/UL — LOW (ref 4.2–5.4)
RBC # FLD: 13.5 % — SIGNIFICANT CHANGE UP (ref 11.5–14.5)
SODIUM SERPL-SCNC: 136 MMOL/L — SIGNIFICANT CHANGE UP (ref 135–146)
SPECIMEN SOURCE: SIGNIFICANT CHANGE UP
WBC # BLD: 7.01 K/UL — SIGNIFICANT CHANGE UP (ref 4.8–10.8)
WBC # FLD AUTO: 7.01 K/UL — SIGNIFICANT CHANGE UP (ref 4.8–10.8)

## 2023-06-16 PROCEDURE — 73600 X-RAY EXAM OF ANKLE: CPT | Mod: 26,LT

## 2023-06-16 PROCEDURE — 99233 SBSQ HOSP IP/OBS HIGH 50: CPT

## 2023-06-16 PROCEDURE — 93970 EXTREMITY STUDY: CPT | Mod: 26

## 2023-06-16 RX ADMIN — ONDANSETRON 8 MILLIGRAM(S): 8 TABLET, FILM COATED ORAL at 14:54

## 2023-06-16 RX ADMIN — Medication 2 MILLIGRAM(S): at 10:25

## 2023-06-16 RX ADMIN — MORPHINE SULFATE 120 MILLIGRAM(S): 50 CAPSULE, EXTENDED RELEASE ORAL at 08:48

## 2023-06-16 RX ADMIN — Medication 2 MILLIGRAM(S): at 21:35

## 2023-06-16 RX ADMIN — ONDANSETRON 8 MILLIGRAM(S): 8 TABLET, FILM COATED ORAL at 05:51

## 2023-06-16 RX ADMIN — Medication 1 MILLIGRAM(S): at 12:39

## 2023-06-16 RX ADMIN — Medication 81 MILLIGRAM(S): at 12:40

## 2023-06-16 RX ADMIN — Medication 500 MILLIGRAM(S): at 12:40

## 2023-06-16 RX ADMIN — PANTOPRAZOLE SODIUM 40 MILLIGRAM(S): 20 TABLET, DELAYED RELEASE ORAL at 05:50

## 2023-06-16 RX ADMIN — ONDANSETRON 8 MILLIGRAM(S): 8 TABLET, FILM COATED ORAL at 21:27

## 2023-06-16 RX ADMIN — MORPHINE SULFATE 120 MILLIGRAM(S): 50 CAPSULE, EXTENDED RELEASE ORAL at 18:46

## 2023-06-16 RX ADMIN — SENNA PLUS 1 TABLET(S): 8.6 TABLET ORAL at 17:54

## 2023-06-16 RX ADMIN — BUDESONIDE AND FORMOTEROL FUMARATE DIHYDRATE 2 PUFF(S): 160; 4.5 AEROSOL RESPIRATORY (INHALATION) at 10:25

## 2023-06-16 RX ADMIN — ESCITALOPRAM OXALATE 10 MILLIGRAM(S): 10 TABLET, FILM COATED ORAL at 21:27

## 2023-06-16 RX ADMIN — MORPHINE SULFATE 120 MILLIGRAM(S): 50 CAPSULE, EXTENDED RELEASE ORAL at 00:24

## 2023-06-16 RX ADMIN — POLYETHYLENE GLYCOL 3350 17 GRAM(S): 17 POWDER, FOR SOLUTION ORAL at 12:40

## 2023-06-16 RX ADMIN — SENNA PLUS 1 TABLET(S): 8.6 TABLET ORAL at 05:51

## 2023-06-16 NOTE — PROGRESS NOTE ADULT - ASSESSMENT
52 year old woman with PMH of renal stones (urology Dr. Hernandez) SLE  lupus with lupus anticoagulant, antiphospholipid syndrome,  rheumatoid arthritis, cervical cancer sp hysterectomy,  laryngeal cancer sp CT, RT, HFpEF (06/21/19 EF 63%) s/p AICD+PPM, DVT/PE on coumadin,  NSTEMI in 2015, subclavian stenosis s/p stent placement, HTN, HLD, PPM/AICD, , and recent admission from 09/22-10/09 for proximal occlusion of left subclavian vein status post in-stent restenosis presenting to ER for evaluation of hematuria and bruising    ·	Hematuria possibly related to elevated INR  ·	LE and UE hematoma  ·	Coagulopahty due to coumadin  ·	Antiphospholipid syndrome  ·	Prediabetes  ·	H/o  DVT/PE  ·	SLE/RA      ·	Sepsis ruled out on admission   ·	Monitor off antibiotics , asymptomatic   ·	Hold coumadin for now. Follow INR today  ·	LE and UE hematoma-conservative management  ·	h/o long-QT syndrome and VT s/p AICD/HFpEF (06/21/19 EF 63%) s/p ppm / NSTEMI in 2015./Subclavian stenosis s/p stent placement  ·	C/w home meds (Aspirin 81, Hydralazine 50 TID, Mg 500 QD, Toprol  QD)   ·	Prediabetes- HbA1c 5.9. Diabetic diet  ·	Expected inpatient care exceeds 48 hrs.  52 year old woman with PMH of renal stones (urology Dr. Hernandez) SLE  lupus with lupus anticoagulant, antiphospholipid syndrome,  rheumatoid arthritis, cervical cancer sp hysterectomy,  laryngeal cancer sp CT, RT, HFpEF (06/21/19 EF 63%) s/p AICD+PPM, DVT/PE on coumadin,  NSTEMI in 2015, subclavian stenosis s/p stent placement, HTN, HLD, PPM/AICD, , and recent admission from 09/22-10/09 for proximal occlusion of left subclavian vein status post in-stent restenosis presenting to ER for evaluation of hematuria and bruising    ·	Hematuria possibly related to elevated INR  ·	LE and UE hematoma  ·	Coagulopathy due to coumadin  ·	Antiphospholipid syndrome  ·	Prediabetes  ·	H/o  DVT/PE  ·	SLE/RA      ·	Sepsis ruled out on admission   ·	Monitor off antibiotics , asymptomatic   ·	Hold coumadin for now. Follow INR today  ·	LE and UE hematoma-conservative management  ·	h/o long-QT syndrome and VT s/p AICD/HFpEF (06/21/19 EF 63%) s/p ppm / NSTEMI in 2015./Subclavian stenosis s/p stent placement  ·	C/w home meds (Aspirin 81, Hydralazine 50 TID, Mg 500 QD, Toprol  QD)   ·	Prediabetes- HbA1c 5.9. Diabetic diet  ·	Expected inpatient care exceeds 48 hrs.

## 2023-06-16 NOTE — PROGRESS NOTE ADULT - SUBJECTIVE AND OBJECTIVE BOX
05/23/23 0018   Notification   Reason for Communication Test results  (CT abd/pelvis)   Communication type Provider   Communicated To Dr. Linares   Method of Communication Page   Response No new orders     CT scan showing new free fluid in pelvis, moderate anasarca and mild stool burden.  Per on call MD CT results to be addressed in AM. No further recommendations at this time.   ILEANA MCNEIL 52y Female  MRN#: [id number]  Hospital Day: 2d    Pt is currently admitted with the primary diagnosis of hematuria    SUBJECTIVE  Overnight events: None  Subjective complaints: Difficulty initiating and sustaining urination, but has intense urge to urinate. Complains of lower abdominal and suprapubic pain. L ankle swelling and pain since today.     Present Today:   - Rodarte:  No [ x ], Yes [   ] : Indication:     - Type of IV Access:       .. CVC/Piccline:  No [ x ], Yes [   ] : Indication:       .. Midline: No [ x ], Yes [   ] : Indication:                                             ----------------------------------------------------------  OBJECTIVE    VITAL SIGNS: Last 24 Hours  T(C): 35.6 (2023 07:52), Max: 36.8 (15 Geovani 2023 16:00)  T(F): 96 (2023 07:52), Max: 98.3 (15 Geovani 2023 16:00)  HR: 71 (2023 07:52) (70 - 95)  BP: 97/55 (2023 07:52) (97/55 - 147/71)  BP(mean): --  RR: 18 (2023 07:52) (18 - 18)  SpO2: 95% (2023 07:52) (95% - 98%)      PHYSICAL EXAM:  General: well-appearing in NAD.  HEENT: Normocephalic, nontraumatic. PERRL.  LUNGS: Clear to auscultation b/l. No wheezes, rales, or rhonchi.  HEART: RRR. No murmurs, rubs, or gallops.  ABDOMEN: Soft. Distended. TTP in periumbilical and suprapubic areas. Normoactive bowel sounds.  EXT: Pulses palpable x 4. No lower extremity edema.  NEURO: AAOx4.  SKIN: Warm, dry. Multiple ecchymoses in different stages on lower abdomen back, arms, and legs    PAST MEDICAL & SURGICAL HISTORY  Lupus  RA (rheumatoid arthritis)  HTN (hypertension)  CHF (congestive heart failure)  COPD (chronic obstructive pulmonary disease)  DVT (deep venous thrombosis)  Pulmonary embolism  History of laryngeal cancer  GERD (gastroesophageal reflux disease)  Cervical cancer  S/P appendectomy  S/P cholecystectomy  AICD (automatic cardioverter/defibrillator) present  S/P hysterectomy  History of back surgery  H/O foot surgery  S/P eye surgery  Subclavian arterial stenosis                                            -----------------------------------------------------------  ALLERGIES:  Avelox (Other)  Vantin (Other (Mild))  Plaquenil (Other)  Arava (Anaphylaxis; Angioedema)  adhesives (Rash; Urticaria)                                            ------------------------------------------------------------    HOME MEDICATIONS  Home Medications:  albuterol 90 mcg/inh inhalation aerosol: 2 puff(s) inhaled every 6 hours, As Needed (11 May 2022 16:03)  ALPRAZolam 1 mg oral tablet: 2 tab(s) orally 3 times a day, As Needed (05 May 2022 21:12)  cyanocobalamin 1000 mcg/mL injectable solution: 1 milliliter(s) injectable once a week (05 May 2022 21:12)  folic acid 1 mg oral tablet: 1 tab(s) orally once a day (05 May 2022 21:12)  hydrALAZINE 50 mg oral tablet: 1 tab(s) orally 2 times a day (05 May 2022 21:12)  Lexapro 10 mg oral tablet: 1 tab(s) orally once a day (at bedtime) (2023 20:33)  Metoprolol Succinate  mg oral tablet, extended release: 1 tab(s) orally once a day (05 May 2022 21:12)  MS Contin 60 mg oral tablet, extended release: 2 tab(s) orally 3 times a day (after meals), As Needed (11 May 2022 16:03)  Trelegy Ellipta 100 mcg-62.5 mcg-25 mcg/inh inhalation powder: 1 puff(s) inhaled once a day (05 May 2022 21:12)                           MEDICATIONS:  STANDING MEDICATIONS  aspirin enteric coated 81 milliGRAM(s) Oral daily  budesonide  80 MICROgram(s)/formoterol 4.5 MICROgram(s) Inhaler 2 Puff(s) Inhalation two times a day  escitalopram 10 milliGRAM(s) Oral at bedtime  folic acid 1 milliGRAM(s) Oral daily  hydrALAZINE 50 milliGRAM(s) Oral two times a day  magnesium gluconate 500 milliGRAM(s) Oral daily  metoprolol succinate  milliGRAM(s) Oral daily  ondansetron   Disintegrating Tablet 8 milliGRAM(s) Oral every 8 hours  pantoprazole    Tablet 40 milliGRAM(s) Oral before breakfast  polyethylene glycol 3350 17 Gram(s) Oral daily  senna 1 Tablet(s) Oral two times a day  tiotropium 2.5 MICROgram(s) Inhaler 2 Puff(s) Inhalation daily    PRN MEDICATIONS  acetaminophen     Tablet .. 650 milliGRAM(s) Oral every 6 hours PRN  albuterol    90 MICROgram(s) HFA Inhaler 2 Puff(s) Inhalation every 6 hours PRN  ALPRAZolam 2 milliGRAM(s) Oral three times a day PRN  morphine ER Tablet 120 milliGRAM(s) Oral three times a day PRN                                            ------------------------------------------------------------    LABS:                        10.6   7.01  )-----------( 187      ( 2023 07:03 )             32.9     06-16    136  |  99  |  13  ----------------------------<  102<H>  3.7   |  26  |  0.9    Ca    8.8      2023 07:03  Mg     1.8     -    TPro  5.9<L>  /  Alb  3.5  /  TBili  0.3  /  DBili  x   /  AST  12  /  ALT  6   /  AlkPhos  88  06-16    PT/INR - ( 15 Geovani 2023 07:40 )   PT: >40.00 sec;   INR: 4.28 ratio    PTT - ( 15 Geovani 2023 07:40 )  PTT:85.4 sec    Urinalysis Basic - ( 2023 16:40 )    Color: Monica / Appearance: Turbid / S.036 / pH: x  Gluc: x / Ketone: Trace  / Bili: Small / Urobili: 6 mg/dL   Blood: x / Protein: 100 mg/dL / Nitrite: Negative   Leuk Esterase: Large / RBC: 50 /HPF /  /HPF   Sq Epi: x / Non Sq Epi: x / Bacteria: Moderate    CAPILLARY BLOOD GLUCOSE                                            -------------------------------------------------------------  RADIOLOGY:    < from: CT Abdomen and Pelvis w/ IV Cont (23 @ 16:50) >  IMPRESSION:  Partially imaged subcutaneous stranding in the right anterior thigh soft   tissue. Correlation with physical examination recommended    < from: TTE Echo Complete w/ Contrast w/ Doppler (06.15.23 @ 09:45) >  Summary:   1. Normal global left ventricular systolic function with a biplane EF of   55 %. Normal diastolic function. Normal internal cavity size and wall   thickness.   2. Normal right ventricular size and function.   3. Sclerotic aortic valve with normal opening.   4. No hemodynamically significant valvular disease.   5. No echocardiographic evidence of pulmonary hypertension (IVC was not   adequately visualized; peak TR velocity 2.36m/s).   6. There is no evidence of pericardial effusion.                                              -------------------------------------------------------------- ILEANA MCNEIL 52y Female  MRN#: [id number]  Hospital Day: 2d    Pt is currently admitted with the primary diagnosis of hematuria    SUBJECTIVE  Overnight events: None  Subjective complaints: Difficulty initiating and sustaining urination, but has intense urge to urinate. Complains of lower abdominal and suprapubic pain. L ankle swelling and pain since today.     Present Today:   - Rodarte:  No [ x ], Yes [   ] : Indication:     - Type of IV Access:       .. CVC/Piccline:  No [ x ], Yes [   ] : Indication:       .. Midline: No [ x ], Yes [   ] : Indication:                                             ----------------------------------------------------------  OBJECTIVE    VITAL SIGNS: Last 24 Hours  T(C): 35.6 (2023 07:52), Max: 36.8 (15 Geovani 2023 16:00)  T(F): 96 (2023 07:52), Max: 98.3 (15 Geovani 2023 16:00)  HR: 71 (2023 07:52) (70 - 95)  BP: 97/55 (2023 07:52) (97/55 - 147/71)  BP(mean): --  RR: 18 (2023 07:52) (18 - 18)  SpO2: 95% (2023 07:52) (95% - 98%)      PHYSICAL EXAM:  General: well-appearing in NAD.  HEENT: Normocephalic, nontraumatic. PERRL.  LUNGS: Clear to auscultation b/l. No wheezes, rales, or rhonchi.  HEART: RRR. No murmurs, rubs, or gallops.  ABDOMEN: Soft. Distended. TTP in periumbilical and suprapubic areas. Normoactive bowel sounds.  EXT: Pulses palpable x 4. No lower extremity edema.  NEURO: AAOx4.  SKIN: Warm, dry. Multiple ecchymoses in different stages on lower abdomen back, arms, and legs    PAST MEDICAL & SURGICAL HISTORY  Lupus  RA (rheumatoid arthritis)  HTN (hypertension)  CHF (congestive heart failure)  COPD (chronic obstructive pulmonary disease)  DVT (deep venous thrombosis)  Pulmonary embolism  History of laryngeal cancer  GERD (gastroesophageal reflux disease)  Cervical cancer  S/P appendectomy  S/P cholecystectomy  AICD (automatic cardioverter/defibrillator) present  S/P hysterectomy  History of back surgery  H/O foot surgery  S/P eye surgery  Subclavian arterial stenosis                                            -----------------------------------------------------------  ALLERGIES:  Avelox (Other)  Vantin (Other (Mild))  Plaquenil (Other)  Arava (Anaphylaxis; Angioedema)  adhesives (Rash; Urticaria)                                            ------------------------------------------------------------    HOME MEDICATIONS  Home Medications:  albuterol 90 mcg/inh inhalation aerosol: 2 puff(s) inhaled every 6 hours, As Needed (11 May 2022 16:03)  ALPRAZolam 1 mg oral tablet: 2 tab(s) orally 3 times a day, As Needed (05 May 2022 21:12)  cyanocobalamin 1000 mcg/mL injectable solution: 1 milliliter(s) injectable once a week (05 May 2022 21:12)  folic acid 1 mg oral tablet: 1 tab(s) orally once a day (05 May 2022 21:12)  hydrALAZINE 50 mg oral tablet: 1 tab(s) orally 2 times a day (05 May 2022 21:12)  Lexapro 10 mg oral tablet: 1 tab(s) orally once a day (at bedtime) (2023 20:33)  Metoprolol Succinate  mg oral tablet, extended release: 1 tab(s) orally once a day (05 May 2022 21:12)  MS Contin 60 mg oral tablet, extended release: 2 tab(s) orally 3 times a day (after meals), As Needed (11 May 2022 16:03)  Trelegy Ellipta 100 mcg-62.5 mcg-25 mcg/inh inhalation powder: 1 puff(s) inhaled once a day (05 May 2022 21:12)                           MEDICATIONS:  STANDING MEDICATIONS  aspirin enteric coated 81 milliGRAM(s) Oral daily  budesonide  80 MICROgram(s)/formoterol 4.5 MICROgram(s) Inhaler 2 Puff(s) Inhalation two times a day  escitalopram 10 milliGRAM(s) Oral at bedtime  folic acid 1 milliGRAM(s) Oral daily  hydrALAZINE 50 milliGRAM(s) Oral two times a day  magnesium gluconate 500 milliGRAM(s) Oral daily  metoprolol succinate  milliGRAM(s) Oral daily  ondansetron   Disintegrating Tablet 8 milliGRAM(s) Oral every 8 hours  pantoprazole    Tablet 40 milliGRAM(s) Oral before breakfast  polyethylene glycol 3350 17 Gram(s) Oral daily  senna 1 Tablet(s) Oral two times a day  tiotropium 2.5 MICROgram(s) Inhaler 2 Puff(s) Inhalation daily    PRN MEDICATIONS  acetaminophen     Tablet .. 650 milliGRAM(s) Oral every 6 hours PRN  albuterol    90 MICROgram(s) HFA Inhaler 2 Puff(s) Inhalation every 6 hours PRN  ALPRAZolam 2 milliGRAM(s) Oral three times a day PRN  morphine ER Tablet 120 milliGRAM(s) Oral three times a day PRN                                            ------------------------------------------------------------    LABS:                        10.6   7.01  )-----------( 187      ( 2023 07:03 )             32.9     06-16    136  |  99  |  13  ----------------------------<  102<H>  3.7   |  26  |  0.9    Ca    8.8      2023 07:03  Mg     1.8     -    TPro  5.9<L>  /  Alb  3.5  /  TBili  0.3  /  DBili  x   /  AST  12  /  ALT  6   /  AlkPhos  88  06-16    PT/INR - ( 15 Geovani 2023 07:40 )   PT: >40.00 sec;   INR: 4.28 ratio    PTT - ( 15 Geovani 2023 07:40 )  PTT:85.4 sec    Urinalysis Basic - ( 2023 16:40 )    Color: Monica / Appearance: Turbid / S.036 / pH: x  Gluc: x / Ketone: Trace  / Bili: Small / Urobili: 6 mg/dL   Blood: x / Protein: 100 mg/dL / Nitrite: Negative   Leuk Esterase: Large / RBC: 50 /HPF /  /HPF   Sq Epi: x / Non Sq Epi: x / Bacteria: Moderate    CAPILLARY BLOOD GLUCOSE                                            -------------------------------------------------------------  RADIOLOGY:    < from: Xray Ankle 2 Views, Left (23 @ 10:02) >  IMPRESSION: Radiopacities in the soft tissues of the left distal tibia/fibula of uncertain etiology.  Osteopenia. No acute fracture or dislocation. Mild degenerative change in   the talonavicular joint. No significant ankle joint effusion.      < from: [facility] Duplex Lower Ext Vein Scan, Bilat (23 @ 14:08) >  IMPRESSION: No evidence of deep venous thrombosis in either lower extremity.      < from: CT Abdomen and Pelvis w/ IV Cont (23 @ 16:50) >  IMPRESSION:  Partially imaged subcutaneous stranding in the right anterior thigh soft   tissue. Correlation with physical examination recommended    < from: TTE Echo Complete w/ Contrast w/ Doppler (06.15.23 @ 09:45) >  Summary:   1. Normal global left ventricular systolic function with a biplane EF of   55 %. Normal diastolic function. Normal internal cavity size and wall   thickness.   2. Normal right ventricular size and function.   3. Sclerotic aortic valve with normal opening.   4. No hemodynamically significant valvular disease.   5. No echocardiographic evidence of pulmonary hypertension (IVC was not   adequately visualized; peak TR velocity 2.36m/s).   6. There is no evidence of pericardial effusion.                                              --------------------------------------------------------------

## 2023-06-16 NOTE — PROGRESS NOTE ADULT - ASSESSMENT
52 year old woman with PMH of renal stones (urology Dr. Hernandez) SLE  lupus with lupus anticoagulant, antiphospholipid syndrome,  rheumatoid arthritis, cervical cancer sp hysterectomy,  laryngeal cancer sp CT, RT, HFpEF (06/21/19 EF 63%) s/p AICD+PPM, DVT/PE on coumadin,  NSTEMI in 2015, subclavian stenosis s/p stent placement, HTN, HLD, PPM/AICD, and recent admission from 09/22-10/09 for proximal occlusion of left subclavian vein status post in-stent restenosis presenting to ER for evaluation of hematuria and bruising. Pt sts for the last 5 days has had hematuria (coca cola colored urine) with assoc lower abd pain. + fever yesterday 100.5. Pt notes INR yesterday 5.8 (last coumadin dose 06/11/23). No vomiting, chest pain, sob, alterations in bowel habits, syncope, HOLDER. Patient also report bruises on both arms and feet.     #Coumadin toxicity complicated by gross hematuria and LE and UE hematomas  - History of SLE, antiphospholipid Ab syndrome, VTE and PE  - Patient clinically and hemodynamically stable on admission except tachycardia  - Labs significant for no elevated WBC, Hgb stable at 12.4, Cr 1.1 at baseline, and UA +ve for Blood, LE, & Joby  - CT A/P:  Partially imaged subcutaneous stranding in the right anterior thigh soft tissue. Correlates with hematoma on physical exam.   - INR elevated: 4.95 > 4.28  - Continue to monitor INR  - Hold Coumadin (last coumadin dose 06/11/23), Home dose between 2-4 daily based on INRs      # Suspected UTI   - S/p aztreonam and rocephin doses  - UA: neg nitrite, large LE, large blood, 210 WBC/HPF, mod bacteria  - Urine clx pending  - Per ID, monitor off antibiotics    #Hx of long-QT syndrome and VT s/p AICD/HFpEF )   #s/p ppm   #NSTEMI in 2015  #Subclavian stenosis s/p stent placement  - TTE 6/15/23: LVEF 55%, normal diastolic function    #Left lower extremity swelling and pain  - F/u xray left ankle, LE duplex    #HTN / HLD  - C/w home meds (Aspirin 81, Hydralazine 50 TID, Mg 500 QD, Toprol  QD)   - TSH 4.32, Lipid Profile TG 95, total cholesterol 200, HDL 49, [id number], , Hgb a1c 5.9  - Patient may benefit from a statin  - Keep Mg >2, K > 4     #COPD not on home O2   - 15 Pack year current smoker   - Inhalers and nebs for COPD  - Refused nicotine replacement therapy  - Encouraged to stop smoking     #Chronic pain syndrome/RA  - Morphine sulfate ER 120mg q8h  - Miralax, senna     #Hx of anxiety  - C/w home Xanax 2mg TID PRN, Lexapro 10 QD    #Other  DVT prophylaxis - was on warfarin, currently supratherapeutic INR, hold warfarin until INR decreases. SCDs  GI prophylaxis - Pantoprazole 40mg PO QD   Diet - DASH/TLC  Activity: AAT, Fall Precautions   Code status - Full   Disposition - From Home, pending improvement of INR 52 year old woman with PMH of renal stones (urology Dr. Hernandez) SLE  lupus with lupus anticoagulant, antiphospholipid syndrome,  rheumatoid arthritis, cervical cancer sp hysterectomy,  laryngeal cancer sp CT, RT, HFpEF (06/21/19 EF 63%) s/p AICD+PPM, DVT/PE on coumadin,  NSTEMI in 2015, subclavian stenosis s/p stent placement, HTN, HLD, PPM/AICD, and recent admission from 09/22-10/09 for proximal occlusion of left subclavian vein status post in-stent restenosis presenting to ER for evaluation of hematuria and bruising. Pt sts for the last 5 days has had hematuria (coca cola colored urine) with assoc lower abd pain. + fever yesterday 100.5. Pt notes INR yesterday 5.8 (last coumadin dose 06/11/23). No vomiting, chest pain, sob, alterations in bowel habits, syncope, HOLDER. Patient also report bruises on both arms and feet.     #Coumadin toxicity complicated by gross hematuria and LE and UE hematomas  - History of SLE, antiphospholipid Ab syndrome, VTE and PE  - Patient clinically and hemodynamically stable on admission except tachycardia  - Labs significant for no elevated WBC, Hgb stable at 12.4, Cr 1.1 at baseline, and UA +ve for Blood, LE, & Joby  - CT A/P:  Partially imaged subcutaneous stranding in the right anterior thigh soft tissue. Correlates with hematoma on physical exam.   - INR elevated: 4.95 > 4.28  - Continue to monitor INR  - Hold Coumadin (last coumadin dose 06/11/23), Home dose between 2-4 daily based on INRs      # Suspected UTI   - S/p aztreonam and rocephin doses  - UA: neg nitrite, large LE, large blood, 210 WBC/HPF, mod bacteria  - Urine clx pending  - Per ID, monitor off antibiotics    #Hx of long-QT syndrome and VT s/p AICD/HFpEF )   #s/p ppm   #NSTEMI in 2015  #Subclavian stenosis s/p stent placement  - TTE 6/15/23: LVEF 55%, normal diastolic function    #Left lower extremity swelling and pain  - Xray left ankle: Radiopacities in the soft tissues of the left distal tibia/fibula of uncertain etiology. No acute fracture or dislocation. Mild degenerative change in the talonavicular joint. No significant ankle joint effusion.   - Patient was wearing ankle bracelet during time of Xray which explains the radioopacities seen.  - LE duplex: no evidence of DVT    #HTN / HLD  - C/w home meds (Aspirin 81, Hydralazine 50 TID, Mg 500 QD, Toprol  QD)   - TSH 4.32, Lipid Profile TG 95, total cholesterol 200, HDL 49, [id number], , Hgb a1c 5.9  - Patient may benefit from a statin  - Keep Mg >2, K > 4     #COPD not on home O2   - 15 Pack year current smoker   - Inhalers and nebs for COPD  - Refused nicotine replacement therapy  - Encouraged to stop smoking     #Chronic pain syndrome/RA  - Morphine sulfate ER 120mg q8h  - Miralax, senna     #Hx of anxiety  - C/w home Xanax 2mg TID PRN, Lexapro 10 QD    #Other  DVT prophylaxis - was on warfarin, currently supratherapeutic INR, hold warfarin until INR decreases. SCDs  GI prophylaxis - Pantoprazole 40mg PO QD   Diet - DASH/TLC  Activity: AAT, Fall Precautions   Code status - Full   Disposition - From Home, pending improvement of INR

## 2023-06-16 NOTE — PROGRESS NOTE ADULT - SUBJECTIVE AND OBJECTIVE BOX
Progress Note:  Provider Speciality                            Hospitalist      ILEANA MCNEIL MRN-[id number] 52y Female     CHIEF PRESENTING COMPLAINT:  Patient is a 52y old  Female who presents with a chief complaint of Hematuria + Bruising (15 Geovani 2023 15:33)        SUBJECTIVE:  Patient was seen and examined at bedside.  No new complaints described by patient in morning rounds.   No significant overnight events reported.     HISTORY OF PRESENTING ILLNESS:  HPI:  HPI: 52 year old woman with PMH of renal stones (urology Dr. Hernandez) SLE  lupus with lupus anticoagulant, antiphospholipid syndrome,  rheumatoid arthritis, cervical cancer sp hysterectomy,  laryngeal cancer sp CT, RT, HFpEF (19 EF 63%) s/p AICD+PPM, DVT/PE on coumadin,  NSTEMI in , subclavian stenosis s/p stent placement, HTN, HLD, PPM/AICD, , and recent admission from -10/09 for proximal occlusion of left subclavian vein status post in-stent restenosis presenting to ER for evaluation of hematuria and bruising. Pt sts for the last 5 days has had hematuria (coca cola colored urine) with assoc lower abd pain. + fever yesterday 100.5. Pt notes INR yesterday 5.8 (last coumadin dose 23). No vomiting, chest pain, sob, alterations in bowel habits, syncope, HOLDER. Patient also report bruises on both arms and feet.     In the ER:   VS: /68, , RR 20, T 37 oral, SpO2 98% RA  EKG: Atrial-paced rhythm with prolonged AV conduction  Sig Labs: WBC 7.46, Hgb 12.4, , INR 4.95, Cr 1.1 eGFR 60, UA +ve for Blood, LE, & Joby  Imagin) CT A/P: Partially imaged subcutaneous stranding in the right anterior thigh soft tissue. Correlates with hematoma on physical exam.     In the ER: Aztreonam 2g, Zofran 4      (2023 20:18)        REVIEW OF SYSTEMS:  Patient denies  headache, fever, chills. Denies chest pain, shortness of breath, palpitation. Denies nausea, vomiting, abdominal pain or diarrhoea, Denies dysuria.   At least 10 systems were reviewed in ROS. All systems reviewed  are within normal limits except for the complaints as described in Subjective.    PAST MEDICAL & SURGICAL HISTORY:  PAST MEDICAL & SURGICAL HISTORY:  Lupus      RA (rheumatoid arthritis)      HTN (hypertension)      CHF (congestive heart failure)      COPD (chronic obstructive pulmonary disease)      DVT (deep venous thrombosis)      Pulmonary embolism      History of laryngeal cancer      GERD (gastroesophageal reflux disease)      Cervical cancer      S/P appendectomy      S/P cholecystectomy      AICD (automatic cardioverter/defibrillator) present      S/P hysterectomy      History of back surgery      H/O foot surgery      S/P eye surgery      Subclavian arterial stenosis              VITAL SIGNS:  Vital Signs Last 24 Hrs  T(C): 35.6 (2023 07:52), Max: 36.8 (15 Geovani 2023 16:00)  T(F): 96 (2023 07:52), Max: 98.3 (15 Geovani 2023 16:00)  HR: 71 (2023 07:52) (70 - 95)  BP: 97/55 (2023 07:52) (97/55 - 147/71)  BP(mean): --  RR: 18 (2023 07:52) (18 - 18)  SpO2: 95% (2023 07:52) (95% - 98%)    Parameters below as of 2023 07:52  Patient On (Oxygen Delivery Method): room air              PHYSICAL EXAMINATION:  Not in acute distress  General: No icterus  HEENT:   no JVD.  Heart: S1+S2 audible  Lungs: bilateral  moderate air entry, no wheezing, no crepitations.  Abdomen: Soft, non-tender, non-distended , no  rigidity or guarding.  CNS: Awake alert, CN  grossly intact.  Extremities:  No edema            CONSULTS:  Consultant(s) Notes Reviewed by me.   Care Discussed with Consultants/Other Providers where required.    All the images and labs were reviewed today        MEDICATIONS:  MEDICATIONS  (STANDING):  aspirin enteric coated 81 milliGRAM(s) Oral daily  budesonide  80 MICROgram(s)/formoterol 4.5 MICROgram(s) Inhaler 2 Puff(s) Inhalation two times a day  escitalopram 10 milliGRAM(s) Oral at bedtime  folic acid 1 milliGRAM(s) Oral daily  hydrALAZINE 50 milliGRAM(s) Oral two times a day  magnesium gluconate 500 milliGRAM(s) Oral daily  metoprolol succinate  milliGRAM(s) Oral daily  ondansetron   Disintegrating Tablet 8 milliGRAM(s) Oral every 8 hours  pantoprazole    Tablet 40 milliGRAM(s) Oral before breakfast  polyethylene glycol 3350 17 Gram(s) Oral daily  senna 1 Tablet(s) Oral two times a day  tiotropium 2.5 MICROgram(s) Inhaler 2 Puff(s) Inhalation daily    MEDICATIONS  (PRN):  acetaminophen     Tablet .. 650 milliGRAM(s) Oral every 6 hours PRN Temp greater or equal to 38C (100.4F), Mild Pain (1 - 3)  albuterol    90 MICROgram(s) HFA Inhaler 2 Puff(s) Inhalation every 6 hours PRN Shortness of Breath  ALPRAZolam 2 milliGRAM(s) Oral three times a day PRN Anxiety  morphine ER Tablet 120 milliGRAM(s) Oral three times a day PRN severe Pain               Progress Note:  Provider Speciality                            Hospitalist      ILEANA MCNEIL MRN-[id number] 52y Female     CHIEF PRESENTING COMPLAINT:  Patient is a 52y old  Female who presents with a chief complaint of Hematuria + Bruising (15 Geovani 2023 15:33)        SUBJECTIVE:  Patient was seen and examined at bedside.  No new complaints described by patient in morning rounds.   No significant overnight events reported.     HISTORY OF PRESENTING ILLNESS:  HPI:  HPI: 52 year old woman with PMH of renal stones (urology Dr. Hernandez) SLE  lupus with lupus anticoagulant, antiphospholipid syndrome,  rheumatoid arthritis, cervical cancer sp hysterectomy,  laryngeal cancer sp CT, RT, HFpEF (19 EF 63%) s/p AICD+PPM, DVT/PE on coumadin,  NSTEMI in , subclavian stenosis s/p stent placement, HTN, HLD, PPM/AICD, , and recent admission from -10/09 for proximal occlusion of left subclavian vein status post in-stent restenosis presenting to ER for evaluation of hematuria and bruising. Pt sts for the last 5 days has had hematuria (coca cola colored urine) with assoc lower abd pain. + fever yesterday 100.5. Pt notes INR yesterday 5.8 (last coumadin dose 23). No vomiting, chest pain, sob, alterations in bowel habits, syncope, HOLDER. Patient also report bruises on both arms and feet.     In the ER:   VS: /68, , RR 20, T 37 oral, SpO2 98% RA  EKG: Atrial-paced rhythm with prolonged AV conduction  Sig Labs: WBC 7.46, Hgb 12.4, , INR 4.95, Cr 1.1 eGFR 60, UA +ve for Blood, LE, & Joby  Imagin) CT A/P: Partially imaged subcutaneous stranding in the right anterior thigh soft tissue. Correlates with hematoma on physical exam.     In the ER: Aztreonam 2g, Zofran 4      (2023 20:18)        REVIEW OF SYSTEMS:  Patient denies  headache, fever, chills. Denies chest pain, shortness of breath, palpitation. Denies nausea, vomiting, abdominal pain or diarrhoea, Denies dysuria.   At least 10 systems were reviewed in ROS. All systems reviewed  are within normal limits except for the complaints as described in Subjective.    PAST MEDICAL & SURGICAL HISTORY:  PAST MEDICAL & SURGICAL HISTORY:  Lupus      RA (rheumatoid arthritis)      HTN (hypertension)      CHF (congestive heart failure)      COPD (chronic obstructive pulmonary disease)      DVT (deep venous thrombosis)      Pulmonary embolism      History of laryngeal cancer      GERD (gastroesophageal reflux disease)      Cervical cancer      S/P appendectomy      S/P cholecystectomy      AICD (automatic cardioverter/defibrillator) present      S/P hysterectomy      History of back surgery      H/O foot surgery      S/P eye surgery      Subclavian arterial stenosis              VITAL SIGNS:  Vital Signs Last 24 Hrs  T(C): 35.6 (2023 07:52), Max: 36.8 (15 Geovani 2023 16:00)  T(F): 96 (2023 07:52), Max: 98.3 (15 Geovani 2023 16:00)  HR: 71 (2023 07:52) (70 - 95)  BP: 97/55 (2023 07:52) (97/55 - 147/71)  BP(mean): --  RR: 18 (2023 07:52) (18 - 18)  SpO2: 95% (2023 07:52) (95% - 98%)    Parameters below as of 2023 07:52  Patient On (Oxygen Delivery Method): room air              PHYSICAL EXAMINATION:  Not in acute distress  General: No icterus  HEENT:   no JVD.  Heart: S1+S2 audible  Lungs: bilateral  moderate air entry, no wheezing, no crepitations.  Abdomen: Soft, non-tender, non-distended , no  rigidity or guarding.  CNS: Awake alert, CN  grossly intact.  Extremities:  No edema     Skin:  ecchymoses LUCIUSREYMUNDO         CONSULTS:  Consultant(s) Notes Reviewed by me.   Care Discussed with Consultants/Other Providers where required.    All the images and labs were reviewed today        MEDICATIONS:  MEDICATIONS  (STANDING):  aspirin enteric coated 81 milliGRAM(s) Oral daily  budesonide  80 MICROgram(s)/formoterol 4.5 MICROgram(s) Inhaler 2 Puff(s) Inhalation two times a day  escitalopram 10 milliGRAM(s) Oral at bedtime  folic acid 1 milliGRAM(s) Oral daily  hydrALAZINE 50 milliGRAM(s) Oral two times a day  magnesium gluconate 500 milliGRAM(s) Oral daily  metoprolol succinate  milliGRAM(s) Oral daily  ondansetron   Disintegrating Tablet 8 milliGRAM(s) Oral every 8 hours  pantoprazole    Tablet 40 milliGRAM(s) Oral before breakfast  polyethylene glycol 3350 17 Gram(s) Oral daily  senna 1 Tablet(s) Oral two times a day  tiotropium 2.5 MICROgram(s) Inhaler 2 Puff(s) Inhalation daily    MEDICATIONS  (PRN):  acetaminophen     Tablet .. 650 milliGRAM(s) Oral every 6 hours PRN Temp greater or equal to 38C (100.4F), Mild Pain (1 - 3)  albuterol    90 MICROgram(s) HFA Inhaler 2 Puff(s) Inhalation every 6 hours PRN Shortness of Breath  ALPRAZolam 2 milliGRAM(s) Oral three times a day PRN Anxiety  morphine ER Tablet 120 milliGRAM(s) Oral three times a day PRN severe Pain

## 2023-06-17 LAB
ALBUMIN SERPL ELPH-MCNC: 4.3 G/DL — SIGNIFICANT CHANGE UP (ref 3.5–5.2)
ALP SERPL-CCNC: 94 U/L — SIGNIFICANT CHANGE UP (ref 30–115)
ALT FLD-CCNC: 8 U/L — SIGNIFICANT CHANGE UP (ref 0–41)
ANION GAP SERPL CALC-SCNC: 16 MMOL/L — HIGH (ref 7–14)
APTT BLD: 89.5 SEC — CRITICAL HIGH (ref 27–39.2)
AST SERPL-CCNC: 18 U/L — SIGNIFICANT CHANGE UP (ref 0–41)
BASOPHILS # BLD AUTO: 0.04 K/UL — SIGNIFICANT CHANGE UP (ref 0–0.2)
BASOPHILS NFR BLD AUTO: 0.4 % — SIGNIFICANT CHANGE UP (ref 0–1)
BILIRUB SERPL-MCNC: 0.4 MG/DL — SIGNIFICANT CHANGE UP (ref 0.2–1.2)
BUN SERPL-MCNC: 15 MG/DL — SIGNIFICANT CHANGE UP (ref 10–20)
CALCIUM SERPL-MCNC: 9 MG/DL — SIGNIFICANT CHANGE UP (ref 8.4–10.5)
CHLORIDE SERPL-SCNC: 98 MMOL/L — SIGNIFICANT CHANGE UP (ref 98–110)
CO2 SERPL-SCNC: 23 MMOL/L — SIGNIFICANT CHANGE UP (ref 17–32)
CREAT SERPL-MCNC: 1 MG/DL — SIGNIFICANT CHANGE UP (ref 0.7–1.5)
EGFR: 68 ML/MIN/1.73M2 — SIGNIFICANT CHANGE UP
EOSINOPHIL # BLD AUTO: 0.32 K/UL — SIGNIFICANT CHANGE UP (ref 0–0.7)
EOSINOPHIL NFR BLD AUTO: 3.4 % — SIGNIFICANT CHANGE UP (ref 0–8)
GLUCOSE SERPL-MCNC: 107 MG/DL — HIGH (ref 70–99)
HCT VFR BLD CALC: 36.3 % — LOW (ref 37–47)
HGB BLD-MCNC: 11.8 G/DL — LOW (ref 12–16)
IMM GRANULOCYTES NFR BLD AUTO: 0.4 % — HIGH (ref 0.1–0.3)
INR BLD: 4.89 RATIO — HIGH (ref 0.65–1.3)
LYMPHOCYTES # BLD AUTO: 2.79 K/UL — SIGNIFICANT CHANGE UP (ref 1.2–3.4)
LYMPHOCYTES # BLD AUTO: 29.8 % — SIGNIFICANT CHANGE UP (ref 20.5–51.1)
MCHC RBC-ENTMCNC: 28.5 PG — SIGNIFICANT CHANGE UP (ref 27–31)
MCHC RBC-ENTMCNC: 32.5 G/DL — SIGNIFICANT CHANGE UP (ref 32–37)
MCV RBC AUTO: 87.7 FL — SIGNIFICANT CHANGE UP (ref 81–99)
MONOCYTES # BLD AUTO: 0.77 K/UL — HIGH (ref 0.1–0.6)
MONOCYTES NFR BLD AUTO: 8.2 % — SIGNIFICANT CHANGE UP (ref 1.7–9.3)
NEUTROPHILS # BLD AUTO: 5.41 K/UL — SIGNIFICANT CHANGE UP (ref 1.4–6.5)
NEUTROPHILS NFR BLD AUTO: 57.8 % — SIGNIFICANT CHANGE UP (ref 42.2–75.2)
NRBC # BLD: 0 /100 WBCS — SIGNIFICANT CHANGE UP (ref 0–0)
PLATELET # BLD AUTO: 231 K/UL — SIGNIFICANT CHANGE UP (ref 130–400)
PMV BLD: 11.3 FL — HIGH (ref 7.4–10.4)
POTASSIUM SERPL-MCNC: 4.8 MMOL/L — SIGNIFICANT CHANGE UP (ref 3.5–5)
POTASSIUM SERPL-SCNC: 4.8 MMOL/L — SIGNIFICANT CHANGE UP (ref 3.5–5)
PROT SERPL-MCNC: 6.9 G/DL — SIGNIFICANT CHANGE UP (ref 6–8)
PROTHROM AB SERPL-ACNC: >40 SEC — HIGH (ref 9.95–12.87)
RBC # BLD: 4.14 M/UL — LOW (ref 4.2–5.4)
RBC # FLD: 13.5 % — SIGNIFICANT CHANGE UP (ref 11.5–14.5)
SODIUM SERPL-SCNC: 137 MMOL/L — SIGNIFICANT CHANGE UP (ref 135–146)
WBC # BLD: 9.37 K/UL — SIGNIFICANT CHANGE UP (ref 4.8–10.8)
WBC # FLD AUTO: 9.37 K/UL — SIGNIFICANT CHANGE UP (ref 4.8–10.8)

## 2023-06-17 PROCEDURE — 99233 SBSQ HOSP IP/OBS HIGH 50: CPT

## 2023-06-17 RX ORDER — PHENAZOPYRIDINE HCL 100 MG
100 TABLET ORAL EVERY 8 HOURS
Refills: 0 | Status: COMPLETED | OUTPATIENT
Start: 2023-06-17 | End: 2023-06-19

## 2023-06-17 RX ADMIN — SENNA PLUS 1 TABLET(S): 8.6 TABLET ORAL at 17:08

## 2023-06-17 RX ADMIN — MORPHINE SULFATE 120 MILLIGRAM(S): 50 CAPSULE, EXTENDED RELEASE ORAL at 16:55

## 2023-06-17 RX ADMIN — SENNA PLUS 1 TABLET(S): 8.6 TABLET ORAL at 05:31

## 2023-06-17 RX ADMIN — MORPHINE SULFATE 120 MILLIGRAM(S): 50 CAPSULE, EXTENDED RELEASE ORAL at 08:55

## 2023-06-17 RX ADMIN — ONDANSETRON 8 MILLIGRAM(S): 8 TABLET, FILM COATED ORAL at 13:20

## 2023-06-17 RX ADMIN — Medication 2 MILLIGRAM(S): at 08:56

## 2023-06-17 RX ADMIN — Medication 1 MILLIGRAM(S): at 13:17

## 2023-06-17 RX ADMIN — ESCITALOPRAM OXALATE 10 MILLIGRAM(S): 10 TABLET, FILM COATED ORAL at 21:17

## 2023-06-17 RX ADMIN — Medication 81 MILLIGRAM(S): at 13:17

## 2023-06-17 RX ADMIN — Medication 100 MILLIGRAM(S): at 21:16

## 2023-06-17 RX ADMIN — Medication 500 MILLIGRAM(S): at 13:17

## 2023-06-17 RX ADMIN — Medication 2 MILLIGRAM(S): at 17:02

## 2023-06-17 RX ADMIN — PANTOPRAZOLE SODIUM 40 MILLIGRAM(S): 20 TABLET, DELAYED RELEASE ORAL at 05:41

## 2023-06-17 RX ADMIN — Medication 100 MILLIGRAM(S): at 05:31

## 2023-06-17 RX ADMIN — ONDANSETRON 8 MILLIGRAM(S): 8 TABLET, FILM COATED ORAL at 05:31

## 2023-06-17 RX ADMIN — Medication 50 MILLIGRAM(S): at 17:08

## 2023-06-17 RX ADMIN — TIOTROPIUM BROMIDE 2 PUFF(S): 18 CAPSULE ORAL; RESPIRATORY (INHALATION) at 08:55

## 2023-06-17 RX ADMIN — ONDANSETRON 8 MILLIGRAM(S): 8 TABLET, FILM COATED ORAL at 21:17

## 2023-06-17 RX ADMIN — Medication 50 MILLIGRAM(S): at 05:31

## 2023-06-17 RX ADMIN — MORPHINE SULFATE 120 MILLIGRAM(S): 50 CAPSULE, EXTENDED RELEASE ORAL at 00:12

## 2023-06-17 RX ADMIN — Medication 100 MILLIGRAM(S): at 13:20

## 2023-06-17 NOTE — PROGRESS NOTE ADULT - SUBJECTIVE AND OBJECTIVE BOX
Progress Note:  Provider Speciality                            Hospitalist      ILEANA MCNEIL MRN-[id number] 52y Female     CHIEF PRESENTING COMPLAINT:  Patient is a 52y old  Female who presents with a chief complaint of Hematuria + Bruising (15 Geovani 2023 15:33)        SUBJECTIVE:  Patient was seen and examined at bedside.  No new complaints described by patient in morning rounds. Hematuria continues  No significant overnight events reported.     HISTORY OF PRESENTING ILLNESS:  HPI:  HPI: 52 year old woman with PMH of renal stones (urology Dr. Hernandez) SLE  lupus with lupus anticoagulant, antiphospholipid syndrome,  rheumatoid arthritis, cervical cancer sp hysterectomy,  laryngeal cancer sp CT, RT, HFpEF (19 EF 63%) s/p AICD+PPM, DVT/PE on coumadin,  NSTEMI in , subclavian stenosis s/p stent placement, HTN, HLD, PPM/AICD, , and recent admission from -10/09 for proximal occlusion of left subclavian vein status post in-stent restenosis presenting to ER for evaluation of hematuria and bruising. Pt sts for the last 5 days has had hematuria (coca cola colored urine) with assoc lower abd pain. + fever yesterday 100.5. Pt notes INR yesterday 5.8 (last coumadin dose 23). No vomiting, chest pain, sob, alterations in bowel habits, syncope, HOLDER. Patient also report bruises on both arms and feet.     In the ER:   VS: /68, , RR 20, T 37 oral, SpO2 98% RA  EKG: Atrial-paced rhythm with prolonged AV conduction  Sig Labs: WBC 7.46, Hgb 12.4, , INR 4.95, Cr 1.1 eGFR 60, UA +ve for Blood, LE, & Joby  Imagin) CT A/P: Partially imaged subcutaneous stranding in the right anterior thigh soft tissue. Correlates with hematoma on physical exam.     In the ER: Aztreonam 2g, Zofran 4      (2023 20:18)        REVIEW OF SYSTEMS:  Patient denies  headache, fever, chills. Denies chest pain, shortness of breath, palpitation. Denies nausea, vomiting, abdominal pain or diarrhoea, Denies dysuria.   At least 10 systems were reviewed in ROS. All systems reviewed  are within normal limits except for the complaints as described in Subjective.    PAST MEDICAL & SURGICAL HISTORY:  PAST MEDICAL & SURGICAL HISTORY:  Lupus      RA (rheumatoid arthritis)      HTN (hypertension)      CHF (congestive heart failure)      COPD (chronic obstructive pulmonary disease)      DVT (deep venous thrombosis)      Pulmonary embolism      History of laryngeal cancer      GERD (gastroesophageal reflux disease)      Cervical cancer      S/P appendectomy      S/P cholecystectomy      AICD (automatic cardioverter/defibrillator) present      S/P hysterectomy      History of back surgery      H/O foot surgery      S/P eye surgery      Subclavian arterial stenosis              VITAL SIGNS:  Vital Signs Last 24 Hrs  T(C): 36.1 (23 @ 09:02), Max: 36.2 (23 @ 00:02)  T(F): 97 (23 @ 09:02), Max: 97.2 (23 @ 00:02)  HR: 70 (23 @ 09:02) (69 - 73)  BP: 99/57 (23 @ 09:02) (99/57 - 129/79)  BP(mean): --  RR: 18 (23 @ 09:02) (18 - 18)  SpO2: 97% (23 @ 00:02) (97% - 98%)                PHYSICAL EXAMINATION:  Not in acute distress  General: No icterus  HEENT:   no JVD.  Heart: S1+S2 audible  Lungs: bilateral  moderate air entry, no wheezing, no crepitations.  Abdomen: Soft, non-tender, non-distended , no  rigidity or guarding.  CNS: Awake alert, CN  grossly intact.  Extremities:  No edema     Skin:  ecchymoses LUCIUS, REYMUNDO         CONSULTS:  Consultant(s) Notes Reviewed by me.   Care Discussed with Consultants/Other Providers where required.    All the images and labs were reviewed today        MEDICATIONS:  MEDICATIONS  (STANDING):  aspirin enteric coated 81 milliGRAM(s) Oral daily  budesonide  80 MICROgram(s)/formoterol 4.5 MICROgram(s) Inhaler 2 Puff(s) Inhalation two times a day  escitalopram 10 milliGRAM(s) Oral at bedtime  folic acid 1 milliGRAM(s) Oral daily  hydrALAZINE 50 milliGRAM(s) Oral two times a day  magnesium gluconate 500 milliGRAM(s) Oral daily  metoprolol succinate  milliGRAM(s) Oral daily  ondansetron   Disintegrating Tablet 8 milliGRAM(s) Oral every 8 hours  pantoprazole    Tablet 40 milliGRAM(s) Oral before breakfast  polyethylene glycol 3350 17 Gram(s) Oral daily  senna 1 Tablet(s) Oral two times a day  tiotropium 2.5 MICROgram(s) Inhaler 2 Puff(s) Inhalation daily    MEDICATIONS  (PRN):  acetaminophen     Tablet .. 650 milliGRAM(s) Oral every 6 hours PRN Temp greater or equal to 38C (100.4F), Mild Pain (1 - 3)  albuterol    90 MICROgram(s) HFA Inhaler 2 Puff(s) Inhalation every 6 hours PRN Shortness of Breath  ALPRAZolam 2 milliGRAM(s) Oral three times a day PRN Anxiety  morphine ER Tablet 120 milliGRAM(s) Oral three times a day PRN severe Pain

## 2023-06-17 NOTE — PROGRESS NOTE ADULT - ASSESSMENT
52 year old woman with PMH of renal stones (urology Dr. Hernandez) SLE  lupus with lupus anticoagulant, antiphospholipid syndrome,  rheumatoid arthritis, cervical cancer sp hysterectomy,  laryngeal cancer sp CT, RT, HFpEF (06/21/19 EF 63%) s/p AICD+PPM, DVT/PE on coumadin,  NSTEMI in 2015, subclavian stenosis s/p stent placement, HTN, HLD, PPM/AICD, , and recent admission from 09/22-10/09 for proximal occlusion of left subclavian vein status post in-stent restenosis presenting to ER for evaluation of hematuria and bruising    ·	Hematuria possibly related to elevated INR  ·	LE and UE hematoma  ·	Coagulopathy due to coumadin  ·	Antiphospholipid syndrome  ·	Prediabetes  ·	H/o  DVT/PE  ·	SLE/RA      ·	Sepsis ruled out on admission   ·	Monitor off antibiotics , asymptomatic   ·	Hold coumadin for now. Follow INR today  ·	LE and UE hematoma-conservative management  ·	h/o long-QT syndrome and VT s/p AICD/HFpEF (06/21/19 EF 63%) s/p ppm / NSTEMI in 2015./Subclavian stenosis s/p stent placement  ·	C/w home meds (Aspirin 81, Hydralazine 50 TID, Mg 500 QD, Toprol  QD)   ·	Prediabetes- HbA1c 5.9. Diabetic diet  ·	Expected inpatient care exceeds 24 hrs. INR still supra, hematuria continues

## 2023-06-18 LAB
ALBUMIN SERPL ELPH-MCNC: 4.1 G/DL — SIGNIFICANT CHANGE UP (ref 3.5–5.2)
ALP SERPL-CCNC: 96 U/L — SIGNIFICANT CHANGE UP (ref 30–115)
ALT FLD-CCNC: 7 U/L — SIGNIFICANT CHANGE UP (ref 0–41)
ANION GAP SERPL CALC-SCNC: 12 MMOL/L — SIGNIFICANT CHANGE UP (ref 7–14)
APTT BLD: 76.5 SEC — CRITICAL HIGH (ref 27–39.2)
AST SERPL-CCNC: 13 U/L — SIGNIFICANT CHANGE UP (ref 0–41)
BASOPHILS # BLD AUTO: 0.07 K/UL — SIGNIFICANT CHANGE UP (ref 0–0.2)
BASOPHILS NFR BLD AUTO: 0.8 % — SIGNIFICANT CHANGE UP (ref 0–1)
BILIRUB SERPL-MCNC: 0.5 MG/DL — SIGNIFICANT CHANGE UP (ref 0.2–1.2)
BLD GP AB SCN SERPL QL: SIGNIFICANT CHANGE UP
BUN SERPL-MCNC: 13 MG/DL — SIGNIFICANT CHANGE UP (ref 10–20)
CALCIUM SERPL-MCNC: 9.3 MG/DL — SIGNIFICANT CHANGE UP (ref 8.4–10.5)
CHLORIDE SERPL-SCNC: 99 MMOL/L — SIGNIFICANT CHANGE UP (ref 98–110)
CO2 SERPL-SCNC: 26 MMOL/L — SIGNIFICANT CHANGE UP (ref 17–32)
CREAT SERPL-MCNC: 1.1 MG/DL — SIGNIFICANT CHANGE UP (ref 0.7–1.5)
EGFR: 60 ML/MIN/1.73M2 — SIGNIFICANT CHANGE UP
EOSINOPHIL # BLD AUTO: 0.38 K/UL — SIGNIFICANT CHANGE UP (ref 0–0.7)
EOSINOPHIL NFR BLD AUTO: 4.5 % — SIGNIFICANT CHANGE UP (ref 0–8)
GLUCOSE SERPL-MCNC: 108 MG/DL — HIGH (ref 70–99)
HCT VFR BLD CALC: 35.4 % — LOW (ref 37–47)
HGB BLD-MCNC: 11.7 G/DL — LOW (ref 12–16)
IMM GRANULOCYTES NFR BLD AUTO: 0.4 % — HIGH (ref 0.1–0.3)
INR BLD: 3.74 RATIO — HIGH (ref 0.65–1.3)
LYMPHOCYTES # BLD AUTO: 2.99 K/UL — SIGNIFICANT CHANGE UP (ref 1.2–3.4)
LYMPHOCYTES # BLD AUTO: 35.7 % — SIGNIFICANT CHANGE UP (ref 20.5–51.1)
MAGNESIUM SERPL-MCNC: 1.8 MG/DL — SIGNIFICANT CHANGE UP (ref 1.8–2.4)
MCHC RBC-ENTMCNC: 29 PG — SIGNIFICANT CHANGE UP (ref 27–31)
MCHC RBC-ENTMCNC: 33.1 G/DL — SIGNIFICANT CHANGE UP (ref 32–37)
MCV RBC AUTO: 87.6 FL — SIGNIFICANT CHANGE UP (ref 81–99)
MONOCYTES # BLD AUTO: 0.72 K/UL — HIGH (ref 0.1–0.6)
MONOCYTES NFR BLD AUTO: 8.6 % — SIGNIFICANT CHANGE UP (ref 1.7–9.3)
NEUTROPHILS # BLD AUTO: 4.19 K/UL — SIGNIFICANT CHANGE UP (ref 1.4–6.5)
NEUTROPHILS NFR BLD AUTO: 50 % — SIGNIFICANT CHANGE UP (ref 42.2–75.2)
NRBC # BLD: 0 /100 WBCS — SIGNIFICANT CHANGE UP (ref 0–0)
PLATELET # BLD AUTO: 211 K/UL — SIGNIFICANT CHANGE UP (ref 130–400)
PMV BLD: 10.9 FL — HIGH (ref 7.4–10.4)
POTASSIUM SERPL-MCNC: 4.2 MMOL/L — SIGNIFICANT CHANGE UP (ref 3.5–5)
POTASSIUM SERPL-SCNC: 4.2 MMOL/L — SIGNIFICANT CHANGE UP (ref 3.5–5)
PROT SERPL-MCNC: 6.7 G/DL — SIGNIFICANT CHANGE UP (ref 6–8)
PROTHROM AB SERPL-ACNC: >40 SEC — HIGH (ref 9.95–12.87)
RBC # BLD: 4.04 M/UL — LOW (ref 4.2–5.4)
RBC # FLD: 13.5 % — SIGNIFICANT CHANGE UP (ref 11.5–14.5)
SODIUM SERPL-SCNC: 137 MMOL/L — SIGNIFICANT CHANGE UP (ref 135–146)
WBC # BLD: 8.38 K/UL — SIGNIFICANT CHANGE UP (ref 4.8–10.8)
WBC # FLD AUTO: 8.38 K/UL — SIGNIFICANT CHANGE UP (ref 4.8–10.8)

## 2023-06-18 PROCEDURE — 99233 SBSQ HOSP IP/OBS HIGH 50: CPT

## 2023-06-18 RX ORDER — WARFARIN SODIUM 2.5 MG/1
2 TABLET ORAL ONCE
Refills: 0 | Status: COMPLETED | OUTPATIENT
Start: 2023-06-18 | End: 2023-06-18

## 2023-06-18 RX ADMIN — Medication 50 MILLIGRAM(S): at 17:11

## 2023-06-18 RX ADMIN — MORPHINE SULFATE 120 MILLIGRAM(S): 50 CAPSULE, EXTENDED RELEASE ORAL at 07:05

## 2023-06-18 RX ADMIN — Medication 2 MILLIGRAM(S): at 00:08

## 2023-06-18 RX ADMIN — Medication 100 MILLIGRAM(S): at 05:29

## 2023-06-18 RX ADMIN — Medication 100 MILLIGRAM(S): at 21:14

## 2023-06-18 RX ADMIN — MORPHINE SULFATE 120 MILLIGRAM(S): 50 CAPSULE, EXTENDED RELEASE ORAL at 14:34

## 2023-06-18 RX ADMIN — ONDANSETRON 8 MILLIGRAM(S): 8 TABLET, FILM COATED ORAL at 21:14

## 2023-06-18 RX ADMIN — PANTOPRAZOLE SODIUM 40 MILLIGRAM(S): 20 TABLET, DELAYED RELEASE ORAL at 05:29

## 2023-06-18 RX ADMIN — MORPHINE SULFATE 120 MILLIGRAM(S): 50 CAPSULE, EXTENDED RELEASE ORAL at 00:05

## 2023-06-18 RX ADMIN — Medication 2 MILLIGRAM(S): at 11:20

## 2023-06-18 RX ADMIN — Medication 1 MILLIGRAM(S): at 11:15

## 2023-06-18 RX ADMIN — SENNA PLUS 1 TABLET(S): 8.6 TABLET ORAL at 17:20

## 2023-06-18 RX ADMIN — WARFARIN SODIUM 2 MILLIGRAM(S): 2.5 TABLET ORAL at 21:14

## 2023-06-18 RX ADMIN — SENNA PLUS 1 TABLET(S): 8.6 TABLET ORAL at 05:29

## 2023-06-18 RX ADMIN — ONDANSETRON 8 MILLIGRAM(S): 8 TABLET, FILM COATED ORAL at 14:22

## 2023-06-18 RX ADMIN — Medication 100 MILLIGRAM(S): at 14:21

## 2023-06-18 RX ADMIN — Medication 81 MILLIGRAM(S): at 11:15

## 2023-06-18 RX ADMIN — Medication 500 MILLIGRAM(S): at 11:14

## 2023-06-18 RX ADMIN — ONDANSETRON 8 MILLIGRAM(S): 8 TABLET, FILM COATED ORAL at 05:29

## 2023-06-18 RX ADMIN — ESCITALOPRAM OXALATE 10 MILLIGRAM(S): 10 TABLET, FILM COATED ORAL at 21:14

## 2023-06-18 NOTE — PROGRESS NOTE ADULT - SUBJECTIVE AND OBJECTIVE BOX
ILEANA MCNEIL 52y Female  MRN#: [id number]  Hospital Day: 4d    Pt is currently admitted with the primary diagnosis of hematuria    SUBJECTIVE  Overnight events: None  Subjective complaints: Difficulty initiating and sustaining urination, but has intense urge to urinate.     Present Today:   - Rodarte:  No [ x ], Yes [   ] : Indication:     - Type of IV Access:       .. CVC/Piccline:  No [ x ], Yes [   ] : Indication:       .. Midline: No [ x ], Yes [   ] : Indication:                                             ----------------------------------------------------------  OBJECTIVE    VITAL SIGNS: Last 24 Hours  T(C): 36.8 (17 Jun 2023 23:14), Max: 36.8 (17 Jun 2023 23:14)  T(F): 98.2 (17 Jun 2023 23:14), Max: 98.2 (17 Jun 2023 23:14)  HR: 69 (17 Jun 2023 23:14) (69 - 72)  BP: 94/55 (17 Jun 2023 23:14) (94/55 - 131/62)  BP(mean): --  RR: 18 (17 Jun 2023 23:14) (18 - 18)  SpO2: 98% (17 Jun 2023 23:14) (96% - 98%)      06-16-23 @ 07:01  -  06-17-23 @ 07:00  --------------------------------------------------------  IN: 0 mL / OUT: 500 mL / NET: -500 mL    06-17-23 @ 07:01  -  06-18-23 @ 01:05  --------------------------------------------------------  IN: 0 mL / OUT: 38 mL / NET: -38 mL      PHYSICAL EXAM:  General: well-appearing in NAD.  HEENT: Normocephalic, nontraumatic. PERRL.  LUNGS: Clear to auscultation b/l. No wheezes, rales, or rhonchi.  HEART: RRR. No murmurs, rubs, or gallops.  ABDOMEN: Soft. Distended. TTP in periumbilical and suprapubic areas. Normoactive bowel sounds.  EXT: Pulses palpable x 4. No lower extremity edema.  NEURO: AAOx4.  SKIN: Warm, dry. Multiple ecchymoses in different stages on lower abdomen back, arms, and legs    PAST MEDICAL & SURGICAL HISTORY  Lupus  RA (rheumatoid arthritis)  HTN (hypertension)  CHF (congestive heart failure)  COPD (chronic obstructive pulmonary disease)  DVT (deep venous thrombosis)  Pulmonary embolism  History of laryngeal cancer  GERD (gastroesophageal reflux disease)  Cervical cancer  S/P appendectomy  S/P cholecystectomy  AICD (automatic cardioverter/defibrillator) present  S/P hysterectomy  History of back surgery  H/O foot surgery  S/P eye surgery  Subclavian arterial stenosis                                            -----------------------------------------------------------  ALLERGIES:  Avelox (Other)  Vantin (Other (Mild))  Plaquenil (Other)  Arava (Anaphylaxis; Angioedema)  adhesives (Rash; Urticaria)                                            ------------------------------------------------------------    HOME MEDICATIONS  Home Medications:  albuterol 90 mcg/inh inhalation aerosol: 2 puff(s) inhaled every 6 hours, As Needed (11 May 2022 16:03)  ALPRAZolam 1 mg oral tablet: 2 tab(s) orally 3 times a day, As Needed (05 May 2022 21:12)  cyanocobalamin 1000 mcg/mL injectable solution: 1 milliliter(s) injectable once a week (05 May 2022 21:12)  folic acid 1 mg oral tablet: 1 tab(s) orally once a day (05 May 2022 21:12)  hydrALAZINE 50 mg oral tablet: 1 tab(s) orally 2 times a day (05 May 2022 21:12)  Lexapro 10 mg oral tablet: 1 tab(s) orally once a day (at bedtime) (14 Jun 2023 20:33)  Metoprolol Succinate  mg oral tablet, extended release: 1 tab(s) orally once a day (05 May 2022 21:12)  MS Contin 60 mg oral tablet, extended release: 2 tab(s) orally 3 times a day (after meals), As Needed (11 May 2022 16:03)  Trelegy Ellipta 100 mcg-62.5 mcg-25 mcg/inh inhalation powder: 1 puff(s) inhaled once a day (05 May 2022 21:12)                           MEDICATIONS:  STANDING MEDICATIONS  aspirin enteric coated 81 milliGRAM(s) Oral daily  budesonide  80 MICROgram(s)/formoterol 4.5 MICROgram(s) Inhaler 2 Puff(s) Inhalation two times a day  escitalopram 10 milliGRAM(s) Oral at bedtime  folic acid 1 milliGRAM(s) Oral daily  hydrALAZINE 50 milliGRAM(s) Oral two times a day  magnesium gluconate 500 milliGRAM(s) Oral daily  metoprolol succinate  milliGRAM(s) Oral daily  ondansetron   Disintegrating Tablet 8 milliGRAM(s) Oral every 8 hours  pantoprazole    Tablet 40 milliGRAM(s) Oral before breakfast  phenazopyridine 100 milliGRAM(s) Oral every 8 hours  polyethylene glycol 3350 17 Gram(s) Oral daily  senna 1 Tablet(s) Oral two times a day  tiotropium 2.5 MICROgram(s) Inhaler 2 Puff(s) Inhalation daily    PRN MEDICATIONS  acetaminophen     Tablet .. 650 milliGRAM(s) Oral every 6 hours PRN  albuterol    90 MICROgram(s) HFA Inhaler 2 Puff(s) Inhalation every 6 hours PRN  ALPRAZolam 2 milliGRAM(s) Oral three times a day PRN  morphine ER Tablet 120 milliGRAM(s) Oral three times a day PRN                                            ------------------------------------------------------------    LABS:                        11.8   9.37  )-----------( 231      ( 17 Jun 2023 07:49 )             36.3     06-17    137  |  98  |  15  ----------------------------<  107<H>  4.8   |  23  |  1.0    Ca    9.0      17 Jun 2023 07:49  Mg     1.8     06-16    TPro  6.9  /  Alb  4.3  /  TBili  0.4  /  DBili  x   /  AST  18  /  ALT  8   /  AlkPhos  94  06-17    PT/INR - ( 17 Jun 2023 07:49 )   PT: >40.00 sec;   INR: 4.89 ratio    PTT - ( 17 Jun 2023 07:49 )  PTT:89.5 sec    CAPILLARY BLOOD GLUCOSE                                            -------------------------------------------------------------  RADIOLOGY:    < from: Xray Ankle 2 Views, Left (06.16.23 @ 10:02) >  IMPRESSION: Radiopacities in the soft tissues of the left distal tibia/fibula of uncertain etiology.  Osteopenia. No acute fracture or dislocation. Mild degenerative change in   the talonavicular joint. No significant ankle joint effusion.      < from: [facility] Duplex Lower Ext Vein Scan, Bilat (06.16.23 @ 14:08) >  IMPRESSION: No evidence of deep venous thrombosis in either lower extremity.      < from: CT Abdomen and Pelvis w/ IV Cont (06.14.23 @ 16:50) >  IMPRESSION:  Partially imaged subcutaneous stranding in the right anterior thigh soft   tissue. Correlation with physical examination recommended    < from: TTE Echo Complete w/ Contrast w/ Doppler (06.15.23 @ 09:45) >  Summary:   1. Normal global left ventricular systolic function with a biplane EF of   55 %. Normal diastolic function. Normal internal cavity size and wall   thickness.   2. Normal right ventricular size and function.   3. Sclerotic aortic valve with normal opening.   4. No hemodynamically significant valvular disease.   5. No echocardiographic evidence of pulmonary hypertension (IVC was not   adequately visualized; peak TR velocity 2.36m/s).   6. There is no evidence of pericardial effusion.                                            --------------------------------------------------------------

## 2023-06-18 NOTE — PROGRESS NOTE ADULT - SUBJECTIVE AND OBJECTIVE BOX
Progress Note:  Provider Speciality                            Hospitalist      ILEANA MCNEIL MRN-[id number] 52y Female     CHIEF PRESENTING COMPLAINT:  Patient is a 52y old  Female who presents with a chief complaint of Hematuria + Bruising (15 Geovani 2023 15:33)        SUBJECTIVE:  Patient was seen and examined at bedside.  Reports some tiny new bruises. Hematuria improved  No significant overnight events reported.     HISTORY OF PRESENTING ILLNESS:  HPI:  HPI: 52 year old woman with PMH of renal stones (urology Dr. Hernandez) SLE  lupus with lupus anticoagulant, antiphospholipid syndrome,  rheumatoid arthritis, cervical cancer sp hysterectomy,  laryngeal cancer sp CT, RT, HFpEF (19 EF 63%) s/p AICD+PPM, DVT/PE on coumadin,  NSTEMI in , subclavian stenosis s/p stent placement, HTN, HLD, PPM/AICD, , and recent admission from -10/09 for proximal occlusion of left subclavian vein status post in-stent restenosis presenting to ER for evaluation of hematuria and bruising. Pt sts for the last 5 days has had hematuria (coca cola colored urine) with assoc lower abd pain. + fever yesterday 100.5. Pt notes INR yesterday 5.8 (last coumadin dose 23). No vomiting, chest pain, sob, alterations in bowel habits, syncope, HOLDER. Patient also report bruises on both arms and feet.     In the ER:   VS: /68, , RR 20, T 37 oral, SpO2 98% RA  EKG: Atrial-paced rhythm with prolonged AV conduction  Sig Labs: WBC 7.46, Hgb 12.4, , INR 4.95, Cr 1.1 eGFR 60, UA +ve for Blood, LE, & Joby  Imagin) CT A/P: Partially imaged subcutaneous stranding in the right anterior thigh soft tissue. Correlates with hematoma on physical exam.     In the ER: Aztreonam 2g, Zofran 4      (2023 20:18)        REVIEW OF SYSTEMS:  Patient denies  headache, fever, chills. Denies chest pain, shortness of breath, palpitation. Denies nausea, vomiting, abdominal pain or diarrhoea, Denies dysuria.   At least 10 systems were reviewed in ROS. All systems reviewed  are within normal limits except for the complaints as described in Subjective.    PAST MEDICAL & SURGICAL HISTORY:  PAST MEDICAL & SURGICAL HISTORY:  Lupus      RA (rheumatoid arthritis)      HTN (hypertension)      CHF (congestive heart failure)      COPD (chronic obstructive pulmonary disease)      DVT (deep venous thrombosis)      Pulmonary embolism      History of laryngeal cancer      GERD (gastroesophageal reflux disease)      Cervical cancer      S/P appendectomy      S/P cholecystectomy      AICD (automatic cardioverter/defibrillator) present      S/P hysterectomy      History of back surgery      H/O foot surgery      S/P eye surgery      Subclavian arterial stenosis              VITAL SIGNS:  Vital Signs Last 24 Hrs  T(C): 36.7 (2023 08:33), Max: 36.8 (2023 23:14)  T(F): 98 (2023 08:33), Max: 98.2 (2023 23:14)  HR: 72 (2023 08:33) (69 - 72)  BP: 115/60 (2023 08:33) (94/55 - 131/62)  BP(mean): --  RR: 18 (2023 05:25) (18 - 18)  SpO2: 98% (2023 23:14) (96% - 98%)    Parameters below as of 2023 23:14  Patient On (Oxygen Delivery Method): room air                PHYSICAL EXAMINATION:  Not in acute distress  General: No icterus  HEENT:   no JVD.  Heart: S1+S2 audible  Lungs: bilateral  moderate air entry, no wheezing, no crepitations.  Abdomen: Soft, non-tender, non-distended , no  rigidity or guarding.  CNS: Awake alert, CN  grossly intact.  Extremities:  No edema     Skin:  ecchymoses REYMUNDO KAN         CONSULTS:  Consultant(s) Notes Reviewed by me.   Care Discussed with Consultants/Other Providers where required.    All the images and labs were reviewed today        MEDICATIONS:  MEDICATIONS  (STANDING):  aspirin enteric coated 81 milliGRAM(s) Oral daily  budesonide  80 MICROgram(s)/formoterol 4.5 MICROgram(s) Inhaler 2 Puff(s) Inhalation two times a day  escitalopram 10 milliGRAM(s) Oral at bedtime  folic acid 1 milliGRAM(s) Oral daily  hydrALAZINE 50 milliGRAM(s) Oral two times a day  magnesium gluconate 500 milliGRAM(s) Oral daily  metoprolol succinate  milliGRAM(s) Oral daily  ondansetron   Disintegrating Tablet 8 milliGRAM(s) Oral every 8 hours  pantoprazole    Tablet 40 milliGRAM(s) Oral before breakfast  polyethylene glycol 3350 17 Gram(s) Oral daily  senna 1 Tablet(s) Oral two times a day  tiotropium 2.5 MICROgram(s) Inhaler 2 Puff(s) Inhalation daily    MEDICATIONS  (PRN):  acetaminophen     Tablet .. 650 milliGRAM(s) Oral every 6 hours PRN Temp greater or equal to 38C (100.4F), Mild Pain (1 - 3)  albuterol    90 MICROgram(s) HFA Inhaler 2 Puff(s) Inhalation every 6 hours PRN Shortness of Breath  ALPRAZolam 2 milliGRAM(s) Oral three times a day PRN Anxiety  morphine ER Tablet 120 milliGRAM(s) Oral three times a day PRN severe Pain

## 2023-06-18 NOTE — PROGRESS NOTE ADULT - ASSESSMENT
52 year old woman with PMH of renal stones (urology Dr. Hernandez) SLE  lupus with lupus anticoagulant, antiphospholipid syndrome,  rheumatoid arthritis, cervical cancer sp hysterectomy,  laryngeal cancer sp CT, RT, HFpEF (06/21/19 EF 63%) s/p AICD+PPM, DVT/PE on coumadin,  NSTEMI in 2015, subclavian stenosis s/p stent placement, HTN, HLD, PPM/AICD, and recent admission from 09/22-10/09 for proximal occlusion of left subclavian vein status post in-stent restenosis presenting to ER for evaluation of hematuria and bruising. Pt sts for the last 5 days has had hematuria (coca cola colored urine) with assoc lower abd pain. + fever yesterday 100.5. Pt notes INR yesterday 5.8 (last coumadin dose 06/11/23). No vomiting, chest pain, sob, alterations in bowel habits, syncope, HOLDER. Patient also report bruises on both arms and feet.     #Coumadin toxicity complicated by gross hematuria and LE and UE hematomas  - History of SLE, antiphospholipid Ab syndrome, VTE and PE  - Patient clinically and hemodynamically stable on admission except tachycardia  - Labs significant for no elevated WBC, Hgb stable at 12.4, Cr 1.1 at baseline, and UA +ve for Blood, LE, & Joby  - CT A/P:  Partially imaged subcutaneous stranding in the right anterior thigh soft tissue. Correlates with hematoma on physical exam.   - INR elevated: 4.95 > 4.28  - Continue to monitor INR  - Hold Coumadin (last coumadin dose 06/11/23), Home dose between 2-4 daily based on INRs      # Suspected UTI   - S/p aztreonam and rocephin doses  - UA: neg nitrite, large LE, large blood, 210 WBC/HPF, mod bacteria  - Urine clx pending  - Per ID, monitor off antibiotics    #Hx of long-QT syndrome and VT s/p AICD/HFpEF )   #s/p ppm   #NSTEMI in 2015  #Subclavian stenosis s/p stent placement  - TTE 6/15/23: LVEF 55%, normal diastolic function    #Left lower extremity swelling and pain  - Xray left ankle: Radiopacities in the soft tissues of the left distal tibia/fibula of uncertain etiology. No acute fracture or dislocation. Mild degenerative change in the talonavicular joint. No significant ankle joint effusion.   - Patient was wearing ankle bracelet during time of Xray which explains the radioopacities seen.  - LE duplex: no evidence of DVT    #HTN / HLD  - C/w home meds (Aspirin 81, Hydralazine 50 TID, Mg 500 QD, Toprol  QD)   - TSH 4.32, Lipid Profile TG 95, total cholesterol 200, HDL 49, [id number], , Hgb a1c 5.9  - Patient may benefit from a statin  - Keep Mg >2, K > 4     #COPD not on home O2   - 15 Pack year current smoker   - Inhalers and nebs for COPD  - Refused nicotine replacement therapy  - Encouraged to stop smoking     #Chronic pain syndrome/RA  - Morphine sulfate ER 120mg q8h  - Miralax, senna     #Hx of anxiety  - C/w home Xanax 2mg TID PRN, Lexapro 10 QD    #Other  DVT prophylaxis - was on warfarin, currently supratherapeutic INR, hold warfarin until INR decreases. SCDs  GI prophylaxis - Pantoprazole 40mg PO QD   Diet - DASH/TLC  Activity: AAT, Fall Precautions   Code status - Full   Disposition - From Home, pending improvement of INR

## 2023-06-18 NOTE — PROGRESS NOTE ADULT - ASSESSMENT
52 year old woman with PMH of renal stones (urology Dr. Hernandez) SLE  lupus with lupus anticoagulant, antiphospholipid syndrome,  rheumatoid arthritis, cervical cancer sp hysterectomy,  laryngeal cancer sp CT, RT, HFpEF (06/21/19 EF 63%) s/p AICD+PPM, DVT/PE on coumadin,  NSTEMI in 2015, subclavian stenosis s/p stent placement, HTN, HLD, PPM/AICD, , and recent admission from 09/22-10/09 for proximal occlusion of left subclavian vein status post in-stent restenosis presenting to ER for evaluation of hematuria and bruising    ·	Hematuria possibly related to elevated INR  ·	LE and UE hematoma  ·	Coagulopathy due to coumadin  ·	Antiphospholipid syndrome  ·	Prediabetes  ·	H/o  DVT/PE  ·	SLE/RA      ·	Sepsis ruled out on admission   ·	Monitor off antibiotics , asymptomatic   ·	INR today 3.7( pt targets 2.5 to 3.5). Will resume at low dose of 2 mg tonight  ·	LE and UE hematoma-conservative management  ·	h/o long-QT syndrome and VT s/p AICD/HFpEF (06/21/19 EF 63%) s/p ppm / NSTEMI in 2015./Subclavian stenosis s/p stent placement  ·	C/w home meds (Aspirin 81, Hydralazine 50 TID, Mg 500 QD, Toprol  QD)   ·	Prediabetes- HbA1c 5.9. Diabetic diet  ·	Expected inpatient care exceeds 24 hrs. pending hematology recs

## 2023-06-19 ENCOUNTER — TRANSCRIPTION ENCOUNTER (OUTPATIENT)
Age: 53
End: 2023-06-19

## 2023-06-19 LAB
ALBUMIN SERPL ELPH-MCNC: 4.4 G/DL — SIGNIFICANT CHANGE UP (ref 3.5–5.2)
ALP SERPL-CCNC: 102 U/L — SIGNIFICANT CHANGE UP (ref 30–115)
ALT FLD-CCNC: 8 U/L — SIGNIFICANT CHANGE UP (ref 0–41)
ANION GAP SERPL CALC-SCNC: 17 MMOL/L — HIGH (ref 7–14)
APTT BLD: 72.6 SEC — CRITICAL HIGH (ref 27–39.2)
AST SERPL-CCNC: 17 U/L — SIGNIFICANT CHANGE UP (ref 0–41)
BASOPHILS # BLD AUTO: 0.07 K/UL — SIGNIFICANT CHANGE UP (ref 0–0.2)
BASOPHILS NFR BLD AUTO: 0.7 % — SIGNIFICANT CHANGE UP (ref 0–1)
BILIRUB SERPL-MCNC: 0.5 MG/DL — SIGNIFICANT CHANGE UP (ref 0.2–1.2)
BUN SERPL-MCNC: 17 MG/DL — SIGNIFICANT CHANGE UP (ref 10–20)
CALCIUM SERPL-MCNC: 9.5 MG/DL — SIGNIFICANT CHANGE UP (ref 8.4–10.5)
CHLORIDE SERPL-SCNC: 100 MMOL/L — SIGNIFICANT CHANGE UP (ref 98–110)
CO2 SERPL-SCNC: 22 MMOL/L — SIGNIFICANT CHANGE UP (ref 17–32)
CREAT SERPL-MCNC: 1.1 MG/DL — SIGNIFICANT CHANGE UP (ref 0.7–1.5)
EGFR: 60 ML/MIN/1.73M2 — SIGNIFICANT CHANGE UP
EOSINOPHIL # BLD AUTO: 0.4 K/UL — SIGNIFICANT CHANGE UP (ref 0–0.7)
EOSINOPHIL NFR BLD AUTO: 4.1 % — SIGNIFICANT CHANGE UP (ref 0–8)
GLUCOSE SERPL-MCNC: 104 MG/DL — HIGH (ref 70–99)
HCT VFR BLD CALC: 36.8 % — LOW (ref 37–47)
HGB BLD-MCNC: 11.9 G/DL — LOW (ref 12–16)
IMM GRANULOCYTES NFR BLD AUTO: 0.4 % — HIGH (ref 0.1–0.3)
INR BLD: 2.65 RATIO — HIGH (ref 0.65–1.3)
LYMPHOCYTES # BLD AUTO: 2.92 K/UL — SIGNIFICANT CHANGE UP (ref 1.2–3.4)
LYMPHOCYTES # BLD AUTO: 29.7 % — SIGNIFICANT CHANGE UP (ref 20.5–51.1)
MAGNESIUM SERPL-MCNC: 1.9 MG/DL — SIGNIFICANT CHANGE UP (ref 1.8–2.4)
MCHC RBC-ENTMCNC: 28.3 PG — SIGNIFICANT CHANGE UP (ref 27–31)
MCHC RBC-ENTMCNC: 32.3 G/DL — SIGNIFICANT CHANGE UP (ref 32–37)
MCV RBC AUTO: 87.6 FL — SIGNIFICANT CHANGE UP (ref 81–99)
MONOCYTES # BLD AUTO: 0.79 K/UL — HIGH (ref 0.1–0.6)
MONOCYTES NFR BLD AUTO: 8 % — SIGNIFICANT CHANGE UP (ref 1.7–9.3)
NEUTROPHILS # BLD AUTO: 5.6 K/UL — SIGNIFICANT CHANGE UP (ref 1.4–6.5)
NEUTROPHILS NFR BLD AUTO: 57.1 % — SIGNIFICANT CHANGE UP (ref 42.2–75.2)
NRBC # BLD: 0 /100 WBCS — SIGNIFICANT CHANGE UP (ref 0–0)
PLATELET # BLD AUTO: 249 K/UL — SIGNIFICANT CHANGE UP (ref 130–400)
PMV BLD: 10.8 FL — HIGH (ref 7.4–10.4)
POTASSIUM SERPL-MCNC: 4.5 MMOL/L — SIGNIFICANT CHANGE UP (ref 3.5–5)
POTASSIUM SERPL-SCNC: 4.5 MMOL/L — SIGNIFICANT CHANGE UP (ref 3.5–5)
PROT SERPL-MCNC: 7 G/DL — SIGNIFICANT CHANGE UP (ref 6–8)
PROTHROM AB SERPL-ACNC: 31.1 SEC — HIGH (ref 9.95–12.87)
RBC # BLD: 4.2 M/UL — SIGNIFICANT CHANGE UP (ref 4.2–5.4)
RBC # FLD: 13.6 % — SIGNIFICANT CHANGE UP (ref 11.5–14.5)
SODIUM SERPL-SCNC: 139 MMOL/L — SIGNIFICANT CHANGE UP (ref 135–146)
URINE CYTOLOGY: NORMAL
WBC # BLD: 9.82 K/UL — SIGNIFICANT CHANGE UP (ref 4.8–10.8)
WBC # FLD AUTO: 9.82 K/UL — SIGNIFICANT CHANGE UP (ref 4.8–10.8)

## 2023-06-19 PROCEDURE — 99233 SBSQ HOSP IP/OBS HIGH 50: CPT

## 2023-06-19 PROCEDURE — 99222 1ST HOSP IP/OBS MODERATE 55: CPT

## 2023-06-19 RX ORDER — WARFARIN SODIUM 2.5 MG/1
4 TABLET ORAL ONCE
Refills: 0 | Status: DISCONTINUED | OUTPATIENT
Start: 2023-06-19 | End: 2023-06-19

## 2023-06-19 RX ORDER — WARFARIN SODIUM 2.5 MG/1
5 TABLET ORAL ONCE
Refills: 0 | Status: COMPLETED | OUTPATIENT
Start: 2023-06-19 | End: 2023-06-19

## 2023-06-19 RX ADMIN — POLYETHYLENE GLYCOL 3350 17 GRAM(S): 17 POWDER, FOR SOLUTION ORAL at 11:29

## 2023-06-19 RX ADMIN — WARFARIN SODIUM 5 MILLIGRAM(S): 2.5 TABLET ORAL at 21:35

## 2023-06-19 RX ADMIN — Medication 100 MILLIGRAM(S): at 05:21

## 2023-06-19 RX ADMIN — SENNA PLUS 1 TABLET(S): 8.6 TABLET ORAL at 05:22

## 2023-06-19 RX ADMIN — PANTOPRAZOLE SODIUM 40 MILLIGRAM(S): 20 TABLET, DELAYED RELEASE ORAL at 05:21

## 2023-06-19 RX ADMIN — Medication 1 MILLIGRAM(S): at 11:28

## 2023-06-19 RX ADMIN — Medication 50 MILLIGRAM(S): at 05:21

## 2023-06-19 RX ADMIN — MORPHINE SULFATE 120 MILLIGRAM(S): 50 CAPSULE, EXTENDED RELEASE ORAL at 02:15

## 2023-06-19 RX ADMIN — ONDANSETRON 8 MILLIGRAM(S): 8 TABLET, FILM COATED ORAL at 21:34

## 2023-06-19 RX ADMIN — Medication 500 MILLIGRAM(S): at 11:28

## 2023-06-19 RX ADMIN — Medication 2 MILLIGRAM(S): at 01:18

## 2023-06-19 RX ADMIN — Medication 2 MILLIGRAM(S): at 11:55

## 2023-06-19 RX ADMIN — ONDANSETRON 8 MILLIGRAM(S): 8 TABLET, FILM COATED ORAL at 05:22

## 2023-06-19 RX ADMIN — ESCITALOPRAM OXALATE 10 MILLIGRAM(S): 10 TABLET, FILM COATED ORAL at 21:35

## 2023-06-19 RX ADMIN — MORPHINE SULFATE 120 MILLIGRAM(S): 50 CAPSULE, EXTENDED RELEASE ORAL at 21:34

## 2023-06-19 RX ADMIN — TIOTROPIUM BROMIDE 2 PUFF(S): 18 CAPSULE ORAL; RESPIRATORY (INHALATION) at 09:48

## 2023-06-19 RX ADMIN — MORPHINE SULFATE 120 MILLIGRAM(S): 50 CAPSULE, EXTENDED RELEASE ORAL at 08:17

## 2023-06-19 RX ADMIN — Medication 2 MILLIGRAM(S): at 21:34

## 2023-06-19 RX ADMIN — ONDANSETRON 8 MILLIGRAM(S): 8 TABLET, FILM COATED ORAL at 13:09

## 2023-06-19 RX ADMIN — MORPHINE SULFATE 120 MILLIGRAM(S): 50 CAPSULE, EXTENDED RELEASE ORAL at 01:17

## 2023-06-19 RX ADMIN — Medication 81 MILLIGRAM(S): at 11:28

## 2023-06-19 NOTE — PROGRESS NOTE ADULT - ASSESSMENT
52 year old woman with PMH of renal stones (urology Dr. Hernandez) SLE  lupus with lupus anticoagulant, antiphospholipid syndrome,  rheumatoid arthritis, cervical cancer sp hysterectomy,  laryngeal cancer sp CT, RT, HFpEF (06/21/19 EF 63%) s/p AICD+PPM, DVT/PE on coumadin,  NSTEMI in 2015, subclavian stenosis s/p stent placement, HTN, HLD, PPM/AICD, , and recent admission from 09/22-10/09 for proximal occlusion of left subclavian vein status post in-stent restenosis presenting to ER for evaluation of hematuria and bruising    ·	Hematuria possibly related to elevated INR  ·	LE and UE hematoma  ·	Coagulopathy due to coumadin  ·	Antiphospholipid syndrome  ·	Prediabetes  ·	H/o  DVT/PE  ·	SLE/RA      ·	Sepsis ruled out on admission   ·	Monitor off antibiotics , asymptomatic   ·	INR today 2.6( pt targets 2.5 to 3.5 but hematology recommends 3 to 4). Will resume coumadin at 5 mg daily until INR is >3  ·	LE and UE hematoma-conservative management  ·	h/o long-QT syndrome and VT s/p AICD/HFpEF (06/21/19 EF 63%) s/p ppm / NSTEMI in 2015./Subclavian stenosis s/p stent placement  ·	C/w home meds (Aspirin 81, Hydralazine 50 TID, Mg 500 QD, Toprol  QD)   ·	Prediabetes- HbA1c 5.9. Diabetic diet  ·	Inpatient care  required until INR is >3

## 2023-06-19 NOTE — CONSULT NOTE ADULT - ATTENDING COMMENTS
This is a 52-year-old female who has a very complicated medical history including lupus, antiphospholipid syndrome, RA, cervical cancer, laryngeal cancer, cardiomyopathy with AICD DVT PE, stroke, NSTEMI.    She has been on multiple anticoagulation agents in the past including DOACs, Lovenox, Arixtra     She is currently on Coumadin with an INR of 3-4 and aspirin 81 mg daily and presented with diffuse ecchymosis with hematuria.    Of note she has recently been started on Lexapro.    Plan  #Antiphospholipid syndrome with arterial and venous events  -Recommend that she stays on Coumadin with an INR goal of 3-4 and aspirin 81 mg daily    #Hematoma some easy bruising likely secondary to Coumadin and aspirin which is exacerbated also now due to Lexapro  -I suggested she stops Lexapro and be switched to a different agent such as Wellbutrin I believe that by doing so and avoiding SSRIs her bruising will improve although she is aware that there will always be a baseline bruising issues    Dispo she could go home tomorrow as long as INR is increasing and near at goal does not necessarily have to be therapeutic she has an INR machine at home

## 2023-06-19 NOTE — PROGRESS NOTE ADULT - SUBJECTIVE AND OBJECTIVE BOX
ILEANA MCNEIL 52y Female  MRN#: [id number]   CODE STATUS:________    Hospital Day: 5d    no events overnight   resumed coumadin last night   pending heme onc f/u                                              OBJECTIVE  PAST MEDICAL & SURGICAL HISTORY  Lupus    RA (rheumatoid arthritis)    HTN (hypertension)    CHF (congestive heart failure)    COPD (chronic obstructive pulmonary disease)    DVT (deep venous thrombosis)    Pulmonary embolism    History of laryngeal cancer    GERD (gastroesophageal reflux disease)    Cervical cancer    S/P appendectomy    S/P cholecystectomy    AICD (automatic cardioverter/defibrillator) present    S/P hysterectomy    History of back surgery    H/O foot surgery    S/P eye surgery    Subclavian arterial stenosis                                                ALLERGIES:  Avelox (Other)  Vantin (Other (Mild))  Plaquenil (Other)  Arava (Anaphylaxis; Angioedema)  adhesives (Rash; Urticaria)                           HOME MEDICATIONS  Home Medications:  albuterol 90 mcg/inh inhalation aerosol: 2 puff(s) inhaled every 6 hours, As Needed (11 May 2022 16:03)  ALPRAZolam 1 mg oral tablet: 2 tab(s) orally 3 times a day, As Needed (05 May 2022 21:12)  cyanocobalamin 1000 mcg/mL injectable solution: 1 milliliter(s) injectable once a week (05 May 2022 21:12)  folic acid 1 mg oral tablet: 1 tab(s) orally once a day (05 May 2022 21:12)  hydrALAZINE 50 mg oral tablet: 1 tab(s) orally 2 times a day (05 May 2022 21:12)  Lexapro 10 mg oral tablet: 1 tab(s) orally once a day (at bedtime) (14 Jun 2023 20:33)  Metoprolol Succinate  mg oral tablet, extended release: 1 tab(s) orally once a day (05 May 2022 21:12)  MS Contin 60 mg oral tablet, extended release: 2 tab(s) orally 3 times a day (after meals), As Needed (11 May 2022 16:03)  Trelegy Ellipta 100 mcg-62.5 mcg-25 mcg/inh inhalation powder: 1 puff(s) inhaled once a day (05 May 2022 21:12)                           MEDICATIONS:  STANDING MEDICATIONS  aspirin enteric coated 81 milliGRAM(s) Oral daily  budesonide  80 MICROgram(s)/formoterol 4.5 MICROgram(s) Inhaler 2 Puff(s) Inhalation two times a day  escitalopram 10 milliGRAM(s) Oral at bedtime  folic acid 1 milliGRAM(s) Oral daily  hydrALAZINE 50 milliGRAM(s) Oral two times a day  magnesium gluconate 500 milliGRAM(s) Oral daily  metoprolol succinate  milliGRAM(s) Oral daily  ondansetron   Disintegrating Tablet 8 milliGRAM(s) Oral every 8 hours  pantoprazole    Tablet 40 milliGRAM(s) Oral before breakfast  polyethylene glycol 3350 17 Gram(s) Oral daily  senna 1 Tablet(s) Oral two times a day  tiotropium 2.5 MICROgram(s) Inhaler 2 Puff(s) Inhalation daily  warfarin 4 milliGRAM(s) Oral once    PRN MEDICATIONS  acetaminophen     Tablet .. 650 milliGRAM(s) Oral every 6 hours PRN  albuterol    90 MICROgram(s) HFA Inhaler 2 Puff(s) Inhalation every 6 hours PRN  ALPRAZolam 2 milliGRAM(s) Oral three times a day PRN  morphine ER Tablet 120 milliGRAM(s) Oral three times a day PRN                                            ------------------------------------------------------------  VITAL SIGNS: Last 24 Hours  T(C): 36.1 (18 Jun 2023 23:46), Max: 36.1 (18 Jun 2023 23:46)  T(F): 97 (18 Jun 2023 23:46), Max: 97 (18 Jun 2023 23:46)  HR: 74 (19 Jun 2023 05:15) (70 - 74)  BP: 103/59 (19 Jun 2023 05:15) (103/59 - 136/70)  BP(mean): --  RR: 18 (18 Jun 2023 23:46) (18 - 18)  SpO2: 96% (18 Jun 2023 23:46) (96% - 96%)                                               LABS:                        11.9   9.82  )-----------( 249      ( 19 Jun 2023 07:36 )             36.8     06-18    137  |  99  |  13  ----------------------------<  108<H>  4.2   |  26  |  1.1    Ca    9.3      18 Jun 2023 07:14  Mg     1.8     06-18    TPro  6.7  /  Alb  4.1  /  TBili  0.5  /  DBili  x   /  AST  13  /  ALT  7   /  AlkPhos  96  06-18    PT/INR - ( 19 Jun 2023 07:36 )   PT: 31.10 sec;   INR: 2.65 ratio         PTT - ( 19 Jun 2023 07:36 )  PTT:72.6 sec                                                          RADIOLOGY:        PHYSICAL EXAM:  GENERAL: NAD, walking around room   HEAD:  Atraumatic, Normocephalic  EYES: EOMI, PERRLA, conjunctiva and sclera clear  ENT: Moist mucous membranes  NECK: Supple, No JVD  CHEST/LUNG: Clear to auscultation bilaterally; No rales, rhonchi, wheezing, or rubs. Unlabored respirations  HEART: Regular rate and rhythm; No murmurs, rubs, or gallops  ABDOMEN: BSx4; Soft, nontender, nondistended  EXTREMITIES:  2+ Peripheral Pulses, brisk capillary refill. No clubbing, cyanosis, or edema  NERVOUS SYSTEM:  A&Ox3, no focal deficits   SKIN: diffuse areas of ecchymosis, most notably in the L thigh region.                                            ASSESSMENT & PLAN    52 year old woman with PMH of renal stones (urology Dr. Hernandez) SLE  lupus with lupus anticoagulant, antiphospholipid syndrome,  rheumatoid arthritis, cervical cancer sp hysterectomy,  laryngeal cancer sp CT, RT, HFpEF (06/21/19 EF 63%) s/p AICD+PPM, DVT/PE on coumadin,  NSTEMI in 2015, subclavian stenosis s/p stent placement, HTN, HLD, PPM/AICD, and recent admission from 09/22-10/09 for proximal occlusion of left subclavian vein status post in-stent restenosis presenting to ER for evaluation of hematuria and bruising. Pt sts for the last 5 days has had hematuria (coca cola colored urine) with assoc lower abd pain. + fever yesterday 100.5. Pt notes INR yesterday 5.8 (last coumadin dose 06/11/23). No vomiting, chest pain, sob, alterations in bowel habits, syncope, HOLDER. Patient also report bruises on both arms and feet.     #Coumadin toxicity complicated by gross hematuria and LE and UE hematomas  - History of SLE, antiphospholipid Ab syndrome, VTE and PE  - Patient clinically and hemodynamically stable on admission except tachycardia  - Labs significant for no elevated WBC, Hgb stable at 12.4, Cr 1.1 at baseline, and UA +ve for Blood, LE, & Bac  - CT A/P:  Partially imaged subcutaneous stranding in the right anterior thigh soft tissue. Correlates with hematoma on physical exam.   - INR elevated: 4.95 > 4.28  - Continue to monitor INR  -She got 2 mg dose of coumadin last night inr 2.4 will get today 4mg dose heme onc to follow   -inr in am   -target will be 3-4 inr.     # Suspected UTI   - S/p aztreonam and rocephin doses  - UA: neg nitrite, large LE, large blood, 210 WBC/HPF, mod bacteria  - Urine clx pending  - Per ID, monitor off antibiotics    #Hx of long-QT syndrome and VT s/p AICD/HFpEF )   #s/p ppm   #NSTEMI in 2015  #Subclavian stenosis s/p stent placement  - TTE 6/15/23: LVEF 55%, normal diastolic function    #Left lower extremity swelling and pain  - Xray left ankle: Radiopacities in the soft tissues of the left distal tibia/fibula of uncertain etiology. No acute fracture or dislocation. Mild degenerative change in the talonavicular joint. No significant ankle joint effusion.   - Patient was wearing ankle bracelet during time of Xray which explains the radioopacities seen.  - LE duplex: no evidence of DVT    #HTN / HLD  - C/w home meds (Aspirin 81, Hydralazine 50 TID, Mg 500 QD, Toprol  QD)   - TSH 4.32, Lipid Profile TG 95, total cholesterol 200, HDL 49, Non-[id number], , Hgb a1c 5.9  - Patient may benefit from a statin  - Keep Mg >2, K > 4     #COPD not on home O2   - 15 Pack year current smoker   - Inhalers and nebs for COPD  - Refused nicotine replacement therapy  - Encouraged to stop smoking     #Chronic pain syndrome/RA  - Morphine sulfate ER 120mg q8h  - Miralax, senna     #Hx of anxiety  - C/w home Xanax 2mg TID PRN, Lexapro 10 QD    #Other  DVT prophylaxis - back on warfarin now   GI prophylaxis - Pantoprazole 40mg PO QD   Diet - DASH/TLC  Activity: AAT, Fall Precautions   Code status - Full   Disposition - From Home, pending heme onc recs

## 2023-06-19 NOTE — CONSULT NOTE ADULT - ASSESSMENT
52 year old woman with PMH of renal stones (urology Dr. Hernandez) SLE  lupus with lupus anticoagulant, antiphospholipid syndrome,  rheumatoid arthritis, cervical cancer sp hysterectomy,  laryngeal cancer sp CT, RT, HFpEF (06/21/19 EF 63%) s/p AICD+PPM, DVT/PE on coumadin,  NSTEMI in 2015, subclavian stenosis s/p stent placement, HTN, HLD, PPM/AICD, , and recent admission from 09/22-10/09 for proximal occlusion of left subclavian vein status post in-stent restenosis presenting to ER for evaluation of hematuria and bruising. Pt sts for the last 5 days has had hematuria (coca cola colored urine) with assoc lower abd pain.      # Anti-phospholipid syndrome  # Extensive history of DVTs, recent in stent (subclavian vein) thrombosis  # subcutaneous hematomas and bruising due to supratherapeutic INR    - In past she could not maintain therapeutic INR on warfarin, she clotted on DOAC, she developed subcutaneous lumps and bruising on Lovenox. The subcutanous painfule nodules after fondaparinux were biopsied and they showed ulcerated, hemorrhagic and inflamed subepidermal blister, purpura and perivascular infiltrate of lymphocytes , findings can be seen in a traumatized angiokeratoma or hemangioma and have been reported with fondaparinux.  - She was later admitted in [facility] and diagnosed with in stent thrombosis of subclavian vein and was switched to Lovenox again from Coumadin. She has again developed diffuse ecchymosis at injection sites and tender nodules on her legs.    # Cervical cancer S/P hysterectomy, laryngeal cancer S/P CT and RT  - In remission    Recommendations:     - Patient with diffuse bruising and hematuria likely from supra-therapeutic INR. She is probably very sensitive to anticoagulation and has developed bruising despite INR being not too High. She has known history of bruising and hematomas secondary to Lovenox inj in the past.   - Patient with history of recurrent VTEs. She is on coumadin with therapeutic INR goal of 3-4  - c/w  Coumadin 5 mg daily with the goal INR 3-4 as recommended by the anticoagulation team at [place].  - The painful nodules on her legs are old and she has long history of them and some of them have improved with antibiotics in the past. As per biopsy differentials include  traumatized angiokeratoma or hemangioma but that was only at the sites of fondaparinux injections.  - Pt can be discharged once INR > 3. She can follow up with hematologist at [facility]    Pt to follow up with Dr Sultana as OP  For any questions call [phone] or text via MS teams   52 year old woman with PMH of renal stones (urology Dr. Hernandez) SLE  lupus with lupus anticoagulant, antiphospholipid syndrome,  rheumatoid arthritis, cervical cancer sp hysterectomy,  laryngeal cancer sp CT, RT, HFpEF (06/21/19 EF 63%) s/p AICD+PPM, DVT/PE on coumadin,  NSTEMI in 2015, subclavian stenosis s/p stent placement, HTN, HLD, PPM/AICD, , and recent admission from 09/22-10/09 for proximal occlusion of left subclavian vein status post in-stent restenosis presenting to ER for evaluation of hematuria and bruising. Pt sts for the last 5 days has had hematuria (coca cola colored urine) with assoc lower abd pain.      # Anti-phospholipid syndrome  # Extensive history of DVTs, recent in stent (subclavian vein) thrombosis  # subcutaneous hematomas and bruising due to supratherapeutic INR    - In past she could not maintain therapeutic INR on warfarin, she clotted on DOAC, she developed subcutaneous lumps and bruising on Lovenox. The subcutanous painfule nodules after fondaparinux were biopsied and they showed ulcerated, hemorrhagic and inflamed subepidermal blister, purpura and perivascular infiltrate of lymphocytes , findings can be seen in a traumatized angiokeratoma or hemangioma and have been reported with fondaparinux.  - She was later admitted in [facility] and diagnosed with in stent thrombosis of subclavian vein and was switched to Lovenox again from Coumadin. She has again developed diffuse ecchymosis at injection sites and tender nodules on her legs.    # Cervical cancer S/P hysterectomy, laryngeal cancer S/P CT and RT  - In remission    Recommendations:     - She recently started taking Lexapro, that is most likely causing worsening of bruising (with ASA and Coumadin We recommended her to change it after consultation with her psychiatrist   - Patient with diffuse bruising and hematuria likely from supra-therapeutic INR. She is probably very sensitive to anticoagulation and has developed bruising despite INR being not too High. She has known history of bruising and hematomas secondary to Lovenox inj in the past.   - Patient with history of recurrent VTEs. She is on coumadin with therapeutic INR goal of 3-4  - c/w  Coumadin 5 mg daily with the goal INR 3-4 as recommended by the anticoagulation team at [facility].  - The painful nodules on her legs are old and she has long history of them and some of them have improved with antibiotics in the past. As per biopsy differentials include  traumatized angiokeratoma or hemangioma but that was only at the sites of fondaparinux injections.  - Pt can be discharged once INR > 3. She can follow up with hematologist at [facility]    Pt to follow up with Dr Sultana as OP  For any questions call [phone] or text via MS teams

## 2023-06-19 NOTE — PROGRESS NOTE ADULT - SUBJECTIVE AND OBJECTIVE BOX
Progress Note:  Provider Speciality                            Hospitalist      ILEANA MCNEIL MRN-[id number] 52y Female     CHIEF PRESENTING COMPLAINT:  Patient is a 52y old  Female who presents with a chief complaint of Hematuria + Bruising (15 Geovani 2023 15:33)        SUBJECTIVE:  Patient was seen and examined at bedside.  Reports some tiny new bruises. Hematuria improved  No significant overnight events reported.     HISTORY OF PRESENTING ILLNESS:  HPI:  HPI: 52 year old woman with PMH of renal stones (urology Dr. Hernandez) SLE  lupus with lupus anticoagulant, antiphospholipid syndrome,  rheumatoid arthritis, cervical cancer sp hysterectomy,  laryngeal cancer sp CT, RT, HFpEF (19 EF 63%) s/p AICD+PPM, DVT/PE on coumadin,  NSTEMI in , subclavian stenosis s/p stent placement, HTN, HLD, PPM/AICD, , and recent admission from -10/09 for proximal occlusion of left subclavian vein status post in-stent restenosis presenting to ER for evaluation of hematuria and bruising. Pt sts for the last 5 days has had hematuria (coca cola colored urine) with assoc lower abd pain. + fever yesterday 100.5. Pt notes INR yesterday 5.8 (last coumadin dose 23). No vomiting, chest pain, sob, alterations in bowel habits, syncope, HOLDER. Patient also report bruises on both arms and feet.     In the ER:   VS: /68, , RR 20, T 37 oral, SpO2 98% RA  EKG: Atrial-paced rhythm with prolonged AV conduction  Sig Labs: WBC 7.46, Hgb 12.4, , INR 4.95, Cr 1.1 eGFR 60, UA +ve for Blood, LE, & Joby  Imagin) CT A/P: Partially imaged subcutaneous stranding in the right anterior thigh soft tissue. Correlates with hematoma on physical exam.     In the ER: Aztreonam 2g, Zofran 4      (2023 20:18)        REVIEW OF SYSTEMS:  Patient denies  headache, fever, chills. Denies chest pain, shortness of breath, palpitation. Denies nausea, vomiting, abdominal pain or diarrhoea, Denies dysuria.   At least 10 systems were reviewed in ROS. All systems reviewed  are within normal limits except for the complaints as described in Subjective.    PAST MEDICAL & SURGICAL HISTORY:  PAST MEDICAL & SURGICAL HISTORY:  Lupus      RA (rheumatoid arthritis)      HTN (hypertension)      CHF (congestive heart failure)      COPD (chronic obstructive pulmonary disease)      DVT (deep venous thrombosis)      Pulmonary embolism      History of laryngeal cancer      GERD (gastroesophageal reflux disease)      Cervical cancer      S/P appendectomy      S/P cholecystectomy      AICD (automatic cardioverter/defibrillator) present      S/P hysterectomy      History of back surgery      H/O foot surgery      S/P eye surgery      Subclavian arterial stenosis              VITAL SIGNS:  Vital Signs Last 24 Hrs  T(C): 36.1 (2023 09:29), Max: 36.1 (2023 23:46)  T(F): 97 (2023 09:29), Max: 97 (2023 23:46)  HR: 71 (2023 09:29) (70 - 74)  BP: 116/59 (2023 09:29) (103/59 - 136/70)  BP(mean): --  RR: 18 (2023 09:29) (18 - 18)  SpO2: 96% (2023 23:46) (96% - 96%)                PHYSICAL EXAMINATION:  Not in acute distress  General: No icterus  HEENT:   no JVD.  Heart: S1+S2 audible  Lungs: bilateral  moderate air entry, no wheezing, no crepitations.  Abdomen: Soft, non-tender, non-distended , no  rigidity or guarding.  CNS: Awake alert, CN  grossly intact.  Extremities:  No edema     Skin:  ecchymoses LUCIUS, LE         CONSULTS:  Consultant(s) Notes Reviewed by me.   Care Discussed with Consultants/Other Providers where required.    All the images and labs were reviewed today        MEDICATIONS:  MEDICATIONS  (STANDING):  aspirin enteric coated 81 milliGRAM(s) Oral daily  budesonide  80 MICROgram(s)/formoterol 4.5 MICROgram(s) Inhaler 2 Puff(s) Inhalation two times a day  escitalopram 10 milliGRAM(s) Oral at bedtime  folic acid 1 milliGRAM(s) Oral daily  hydrALAZINE 50 milliGRAM(s) Oral two times a day  magnesium gluconate 500 milliGRAM(s) Oral daily  metoprolol succinate  milliGRAM(s) Oral daily  ondansetron   Disintegrating Tablet 8 milliGRAM(s) Oral every 8 hours  pantoprazole    Tablet 40 milliGRAM(s) Oral before breakfast  polyethylene glycol 3350 17 Gram(s) Oral daily  senna 1 Tablet(s) Oral two times a day  tiotropium 2.5 MICROgram(s) Inhaler 2 Puff(s) Inhalation daily    MEDICATIONS  (PRN):  acetaminophen     Tablet .. 650 milliGRAM(s) Oral every 6 hours PRN Temp greater or equal to 38C (100.4F), Mild Pain (1 - 3)  albuterol    90 MICROgram(s) HFA Inhaler 2 Puff(s) Inhalation every 6 hours PRN Shortness of Breath  ALPRAZolam 2 milliGRAM(s) Oral three times a day PRN Anxiety  morphine ER Tablet 120 milliGRAM(s) Oral three times a day PRN severe Pain

## 2023-06-19 NOTE — PHARMACOTHERAPY INTERVENTION NOTE - COMMENTS
52yFemale      Indication: antiphospholipid syndrome  INR Goal: 3-4  Home Dose: 5mg daily                      except 2.5mg Wed  Bridge Therapy: none      acetaminophen     Tablet .. 650 milliGRAM(s) Oral every 6 hours PRN  albuterol    90 MICROgram(s) HFA Inhaler 2 Puff(s) Inhalation every 6 hours PRN  ALPRAZolam 2 milliGRAM(s) Oral three times a day PRN  aspirin enteric coated 81 milliGRAM(s) Oral daily  budesonide  80 MICROgram(s)/formoterol 4.5 MICROgram(s) Inhaler 2 Puff(s) Inhalation two times a day  escitalopram 10 milliGRAM(s) Oral at bedtime  folic acid 1 milliGRAM(s) Oral daily  hydrALAZINE 50 milliGRAM(s) Oral two times a day  magnesium gluconate 500 milliGRAM(s) Oral daily  metoprolol succinate  milliGRAM(s) Oral daily  morphine ER Tablet 120 milliGRAM(s) Oral three times a day PRN  ondansetron   Disintegrating Tablet 8 milliGRAM(s) Oral every 8 hours  pantoprazole    Tablet 40 milliGRAM(s) Oral before breakfast  polyethylene glycol 3350 17 Gram(s) Oral daily  senna 1 Tablet(s) Oral two times a day  tiotropium 2.5 MICROgram(s) Inhaler 2 Puff(s) Inhalation daily  warfarin 4 milliGRAM(s) Oral once        Drug Interactions:       H/H: 11.9/36.8  PLT: 249  GFR: 60     Date---------------INR-----------------Dose    INR: 2.65 ratio (06-19-23 @ 07:36)  INR: 3.74 ratio (06-18-23 @ 07:14)  INR: 4.89 ratio (06-17-23 @ 07:49)  INR: 4.97 ratio (06-16-23 @ 11:34)  INR: 4.28 ratio (06-15-23 @ 07:40)  INR: 4.95 ratio (06-14-23 @ 16:43)    Recommended 5mg home dose as patient's INR is currently 2.65 and goal is 3-4. MD stated that attending wants 4mg   1. Recommend Warfarin   5mg   PO x 1   2. Obtain INR tomorrow AM   52yFemale      Indication: antiphospholipid syndrome  INR Goal: 3-4  Home Dose: 5mg daily                      except 2.5mg Wed  Bridge Therapy: none      acetaminophen     Tablet .. 650 milliGRAM(s) Oral every 6 hours PRN  albuterol    90 MICROgram(s) HFA Inhaler 2 Puff(s) Inhalation every 6 hours PRN  ALPRAZolam 2 milliGRAM(s) Oral three times a day PRN  aspirin enteric coated 81 milliGRAM(s) Oral daily  budesonide  80 MICROgram(s)/formoterol 4.5 MICROgram(s) Inhaler 2 Puff(s) Inhalation two times a day  escitalopram 10 milliGRAM(s) Oral at bedtime  folic acid 1 milliGRAM(s) Oral daily  hydrALAZINE 50 milliGRAM(s) Oral two times a day  magnesium gluconate 500 milliGRAM(s) Oral daily  metoprolol succinate  milliGRAM(s) Oral daily  morphine ER Tablet 120 milliGRAM(s) Oral three times a day PRN  ondansetron   Disintegrating Tablet 8 milliGRAM(s) Oral every 8 hours  pantoprazole    Tablet 40 milliGRAM(s) Oral before breakfast  polyethylene glycol 3350 17 Gram(s) Oral daily  senna 1 Tablet(s) Oral two times a day  tiotropium 2.5 MICROgram(s) Inhaler 2 Puff(s) Inhalation daily  warfarin 4 milliGRAM(s) Oral once        Drug Interactions:       H/H: 11.9/36.8  PLT: 249  GFR: 60     Date---------------INR-----------------Dose    INR: 2.65 ratio (06-19-23 @ 07:36)  INR: 3.74 ratio (06-18-23 @ 07:14)  INR: 4.89 ratio (06-17-23 @ 07:49)  INR: 4.97 ratio (06-16-23 @ 11:34)  INR: 4.28 ratio (06-15-23 @ 07:40)  INR: 4.95 ratio (06-14-23 @ 16:43)    Recommended 5mg home dose as patient's INR is currently 2.65 and goal is 3-4   1. Recommend Warfarin   5mg   PO x 1   2. Obtain INR tomorrow AM

## 2023-06-19 NOTE — CONSULT NOTE ADULT - SUBJECTIVE AND OBJECTIVE BOX
Hematology Consult Note    HPI:  HPI: 52 year old woman with PMH of renal stones (urology Dr. Hernandez) SLE  lupus with lupus anticoagulant, antiphospholipid syndrome,  rheumatoid arthritis, cervical cancer sp hysterectomy,  laryngeal cancer sp CT, RT, HFpEF (19 EF 63%) s/p AICD+PPM, DVT/PE on coumadin,  NSTEMI in , subclavian stenosis s/p stent placement, HTN, HLD, PPM/AICD, , and recent admission from -10/09 for proximal occlusion of left subclavian vein status post in-stent restenosis presenting to ER for evaluation of hematuria and bruising. Pt sts for the last 5 days has had hematuria (coca cola colored urine) with assoc lower abd pain. + fever yesterday 100.5. Pt notes INR yesterday 5.8 (last coumadin dose 23). No vomiting, chest pain, sob, alterations in bowel habits, syncope, HOLDER. Patient also report bruises on both arms and feet.     In the ER:   VS: /68, , RR 20, T 37 oral, SpO2 98% RA  EKG: Atrial-paced rhythm with prolonged AV conduction  Sig Labs: WBC 7.46, Hgb 12.4, , INR 4.95, Cr 1.1 eGFR 60, UA +ve for Blood, LE, & Joby  Imagin) CT A/P: Partially imaged subcutaneous stranding in the right anterior thigh soft tissue. Correlates with hematoma on physical exam.     In the ER: Aztreonam 2g, Zofran 4      (2023 20:18)      Allergies    Avelox (Other)  Vantin (Other (Mild))  Plaquenil (Other)  Arava (Anaphylaxis; Angioedema)  adhesives (Rash; Urticaria)    Intolerances        MEDICATIONS  (STANDING):  aspirin enteric coated 81 milliGRAM(s) Oral daily  budesonide  80 MICROgram(s)/formoterol 4.5 MICROgram(s) Inhaler 2 Puff(s) Inhalation two times a day  escitalopram 10 milliGRAM(s) Oral at bedtime  folic acid 1 milliGRAM(s) Oral daily  hydrALAZINE 50 milliGRAM(s) Oral two times a day  magnesium gluconate 500 milliGRAM(s) Oral daily  metoprolol succinate  milliGRAM(s) Oral daily  ondansetron   Disintegrating Tablet 8 milliGRAM(s) Oral every 8 hours  pantoprazole    Tablet 40 milliGRAM(s) Oral before breakfast  polyethylene glycol 3350 17 Gram(s) Oral daily  senna 1 Tablet(s) Oral two times a day  tiotropium 2.5 MICROgram(s) Inhaler 2 Puff(s) Inhalation daily  warfarin 4 milliGRAM(s) Oral once    MEDICATIONS  (PRN):  acetaminophen     Tablet .. 650 milliGRAM(s) Oral every 6 hours PRN Temp greater or equal to 38C (100.4F), Mild Pain (1 - 3)  albuterol    90 MICROgram(s) HFA Inhaler 2 Puff(s) Inhalation every 6 hours PRN Shortness of Breath  ALPRAZolam 2 milliGRAM(s) Oral three times a day PRN Anxiety  morphine ER Tablet 120 milliGRAM(s) Oral three times a day PRN severe Pain      PAST MEDICAL & SURGICAL HISTORY:  Lupus      RA (rheumatoid arthritis)      HTN (hypertension)      CHF (congestive heart failure)      COPD (chronic obstructive pulmonary disease)      DVT (deep venous thrombosis)      Pulmonary embolism      History of laryngeal cancer      GERD (gastroesophageal reflux disease)      Cervical cancer      S/P appendectomy      S/P cholecystectomy      AICD (automatic cardioverter/defibrillator) present      S/P hysterectomy      History of back surgery      H/O foot surgery      S/P eye surgery      Subclavian arterial stenosis          FAMILY HISTORY:  Family history of cervical cancer    FH: thyroid cancer        SOCIAL HISTORY: No EtOH, no tobacco    REVIEW OF SYSTEMS:    CONSTITUTIONAL: No weakness, fevers or chills  EYES/ENT: No visual changes;  No vertigo or throat pain   NECK: No pain or stiffness  RESPIRATORY: No cough, wheezing, hemoptysis; No shortness of breath  CARDIOVASCULAR: No chest pain or palpitations  GASTROINTESTINAL: No abdominal or epigastric pain. No nausea, vomiting, or hematemesis; No diarrhea or constipation. No melena or hematochezia.  GENITOURINARY: No dysuria, frequency or hematuria  NEUROLOGICAL: No numbness or weakness  SKIN: No itching, burning, rashes, or lesions   All other review of systems is negative unless indicated above.        T(F): 97 (23 @ 09:29), Max: 97 (23 @ 23:46)  HR: 71 (23 @ 09:29)  BP: 116/59 (23 @ 09:29)  RR: 18 (23 @ 09:29)  SpO2: 96% (23 @ 23:46)  Wt(kg): --    GENERAL: NAD, well-developed  HEAD:  Atraumatic, Normocephalic  EYES: EOMI, PERRLA, conjunctiva and sclera clear  NECK: Supple, No JVD  CHEST/LUNG: Clear to auscultation bilaterally; No wheeze  HEART: Regular rate and rhythm; No murmurs, rubs, or gallops  ABDOMEN: Soft, Nontender, Nondistended; Bowel sounds present  EXTREMITIES:  2+ Peripheral Pulses, No clubbing, cyanosis, or edema  NEUROLOGY: non-focal  SKIN: No rashes or lesions                          11.9   9.82  )-----------( 249      ( 2023 07:36 )             36.8           137  |  99  |  13  ----------------------------<  108<H>  4.2   |  26  |  1.1    Ca    9.3      2023 07:14  Mg     1.8         TPro  6.7  /  Alb  4.1  /  TBili  0.5  /  DBili  x   /  AST  13  /  ALT  7   /  AlkPhos  96            
ILEANA MCNEIL  52y, Female  Allergy: Avelox (Other)  Vantin (Other (Mild))  Plaquenil (Other)  Arava (Anaphylaxis; Angioedema)  adhesives (Rash; Urticaria)      CHIEF COMPLAINT:   Hematuria + Bruising (15 Geovani 2023 08:45)      LOS  1d    HPI  HPI:  HPI: 52 year old woman with PMH of renal stones (urology Dr. Hernandez) SLE  lupus with lupus anticoagulant, antiphospholipid syndrome,  rheumatoid arthritis, cervical cancer sp hysterectomy,  laryngeal cancer sp CT, RT, HFpEF (19 EF 63%) s/p AICD+PPM, DVT/PE on coumadin,  NSTEMI in , subclavian stenosis s/p stent placement, HTN, HLD, PPM/AICD, , and recent admission from -10/09 for proximal occlusion of left subclavian vein status post in-stent restenosis presenting to ER for evaluation of hematuria and bruising. Pt sts for the last 5 days has had hematuria (coca cola colored urine) with assoc lower abd pain. + fever yesterday 100.5. Pt notes INR yesterday 5.8 (last coumadin dose 23). No vomiting, chest pain, sob, alterations in bowel habits, syncope, HOLDER. Patient also report bruises on both arms and feet.     In the ER:   VS: /68, , RR 20, T 37 oral, SpO2 98% RA  EKG: Atrial-paced rhythm with prolonged AV conduction  Sig Labs: WBC 7.46, Hgb 12.4, , INR 4.95, Cr 1.1 eGFR 60, UA +ve for Blood, LE, & Joby  Imagin) CT A/P: Partially imaged subcutaneous stranding in the right anterior thigh soft tissue. Correlates with hematoma on physical exam.     In the ER: Aztreonam 2g, Zofran 4      (2023 20:18)      INFECTIOUS DISEASE HISTORY:  ID consulted for UTI  Pt has hematuria, denies dysuria. this is in the setting of an elevated INR  denies f/c     Currently ordered for:  cefTRIAXone   IVPB 1000 milliGRAM(s) IV Intermittent every 24 hours      PMH  PAST MEDICAL & SURGICAL HISTORY:  Lupus      RA (rheumatoid arthritis)      HTN (hypertension)      CHF (congestive heart failure)      COPD (chronic obstructive pulmonary disease)      DVT (deep venous thrombosis)      Pulmonary embolism      History of laryngeal cancer      GERD (gastroesophageal reflux disease)      Cervical cancer      S/P appendectomy      S/P cholecystectomy      AICD (automatic cardioverter/defibrillator) present      S/P hysterectomy      History of back surgery      H/O foot surgery      S/P eye surgery      Subclavian arterial stenosis          FAMILY HISTORY  Family history of cervical cancer    FH: thyroid cancer        SOCIAL HISTORY  Social History:  Single, Lives At Home, Not Currently Occupied, Not Sexually Active (2023 20:18)        ROS  General: Denies rigors, nightsweats  HEENT: Denies headache, rhinorrhea, sore throat, eye pain  CV: Denies CP, palpitations  PULM: Denies wheezing, hemoptysis  GI: Denies hematemesis, hematochezia, melena  : as noted above   MSK: Denies arthralgias, myalgias  SKIN: Denies rash, lesions  NEURO: Denies paresthesias, weakness  PSYCH: Denies depression, anxiety   HEME as noted above     VITALS:  T(F): 97.8, Max: 97.8 (06-15-23 @ 15:25)  HR: 70  BP: 120/59  RR: 18Vital Signs Last 24 Hrs  T(C): 36.6 (15 Geovani 2023 15:25), Max: 36.6 (15 Geovani 2023 15:25)  T(F): 97.8 (15 Geovani 2023 15:25), Max: 97.8 (15 Geovani 2023 15:25)  HR: 70 (15 Geovani 2023 15:25) (69 - 79)  BP: 120/59 (15 Geovani 2023 15:25) (109/52 - 151/67)  BP(mean): 97 (2023 21:14) (75 - 97)  RR: 18 (15 Geovani 2023 15:25) (18 - 18)  SpO2: 98% (15 Geovani 2023 15:25) (95% - 98%)    Parameters below as of 15 Geovani 2023 15:25  Patient On (Oxygen Delivery Method): room air        PHYSICAL EXAM:  Gen: NAD, resting in bed  HEENT: Normocephalic, atraumatic  Neck: supple, no lymphadenopathy  CV: Regular rate & regular rhythm  Lungs: decreased BS at bases, no fremitus  Abdomen: Soft, BS present no suprapubic tenderness, no CVAT   Ext: Warm, well perfused  Neuro: non focal, awake  Skin: diffuse ecchymoses, UE, LE, abd   Lines: no phlebitis     TESTS & MEASUREMENTS:                        12.7   7.94  )-----------( 232      ( 15 Geovani 2023 07:40 )             39.3     06-15    137  |  98  |  12  ----------------------------<  89  4.3   |  28  |  0.9    Ca    9.3      15 Geovani 2023 07:40  Mg     1.8     -15    TPro  7.0  /  Alb  4.1  /  TBili  0.5  /  DBili  x   /  AST  13  /  ALT  7   /  AlkPhos  95  06-15      LIVER FUNCTIONS - ( 15 Geovani 2023 07:40 )  Alb: 4.1 g/dL / Pro: 7.0 g/dL / ALK PHOS: 95 U/L / ALT: 7 U/L / AST: 13 U/L / GGT: x           Urinalysis Basic - ( 2023 16:40 )    Color: Monica / Appearance: Turbid / S.036 / pH: x  Gluc: x / Ketone: Trace  / Bili: Small / Urobili: 6 mg/dL   Blood: x / Protein: 100 mg/dL / Nitrite: Negative   Leuk Esterase: Large / RBC: 50 /HPF /  /HPF   Sq Epi: x / Non Sq Epi: x / Bacteria: Moderate        Culture - Urine (collected 10-18-22 @ 17:14)  Source: Clean Catch Clean Catch (Midstream)  Final Report (10-21-22 @ 15:22):    10,000 - 49,000 CFU/mL Escherichia coli  Organism: Escherichia coli (10-21-22 @ 15:22)  Organism: Escherichia coli (10-21-22 @ 15:22)      Method Type: SASKIA      -  Amikacin: S <=16      -  Amoxicillin/Clavulanic Acid: S <=8/4 Consider reserving for cystitis when ampicillin/sulbactam is resistant      -  Ampicillin: R >16 These ampicillin results predict results for amoxicillin      -  Ampicillin/Sulbactam: R >16/8 Enterobacter, Klebsiella aerogenes, Citrobacter, and Serratia may develop resistance during prolonged therapy (3-4 days)      -  Aztreonam: S <=4      -  Cefazolin: S 4 For uncomplicated UTI with K. pneumoniae, E. coli, or P. mirablis: SASKIA <=16 is sensitive and SASKIA >=32 is resistant. This also predicts results for oral agents cefaclor, cefdinir, cefpodoxime, cefprozil, cefuroxime axetil, cephalexin and locarbef for uncomplicated UTI. Note that some isolates may be susceptible to these agents while testing resistant to cefazolin.      -  Cefepime: S <=2      -  Cefoxitin: S <=8      -  Ceftriaxone: S <=1 Enterobacter, Klebsiella aerogenes, Citrobacter, and Serratia may develop resistance during prolonged therapy      -  Ciprofloxacin: R >2      -  Ertapenem: S <=0.5      -  Gentamicin: S <=2      -  Imipenem: S <=1      -  Levofloxacin: R >4      -  Meropenem: S <=1      -  Nitrofurantoin: S <=32 Should not be used to treat pyelonephritis      -  Piperacillin/Tazobactam: S <=8      -  Tigecycline: S <=2      -  Tobramycin: S <=2      -  Trimethoprim/Sulfamethoxazole: S <=0.5/9.5            INFECTIOUS DISEASES TESTING  COVID-19 PCR: NotDetec (10-25-22 @ 05:00)  COVID-19 PCR: NotDetec (10-18-22 @ 16:50)      INFLAMMATORY MARKERS      RADIOLOGY & ADDITIONAL TESTS:  I have personally reviewed the last Chest xray  CXR      CT  CT Abdomen and Pelvis w/ IV Cont:   ACC: [id number] EXAM:  CT ABDOMEN AND PELVIS IC   ORDERED BY: WILL CASTRO     PROCEDURE DATE:  2023          INTERPRETATION:  CLINICAL STATEMENT: Abdominal pain. abd pain, hematuria   hx of renal stones. + hysterectomy    TECHNIQUE: Contiguous axial CT images were obtained of the abdomen and   pelvis following administration of intravenous contrast.  Oral contrast   was not administered. Reformatted images in the coronal and sagittal   planes were acquired.    COMPARISON CT: 10/18/2022    FINDINGS:    LOWER CHEST: Bibasilar subsegmental atelectasis. 4 mm subpleural nodule   within left lower lobe without significant change. Partially imaged AICD   leads.    HEPATOBILIARY: Prior cholecystectomy with expected mild central   intrahepatic and extrahepatic biliary distention. .    SPLEEN: Within normal limits.    ADRENALS: Within normal limits.    PANCREAS: Within normal limits.    KIDNEYS: Symmetric renal enhancement. No hydronephrosis. Subcentimeter   bilateral renal hypodensities, too small to characterize.    ABDOMINOPELVIC NODES: No enlarged abdominal or pelvic lymph nodes.    PELVIC ORGANS: Hysterectomy    PERITONEUM/MESENTERY/BOWEL: No bowel obstruction, ascites or free   intraperitoneal air. Patient is post appendectomy.    BONES/SOFT TISSUES: Degenerative changes of the spine.  Partially imaged   subcutaneous stranding in the right anterior thigh soft tissue.      IMPRESSION:  Partially imaged subcutaneous stranding in the right anterior thigh soft   tissue. Correlation with physical examination recommended    --- End of Report ---            BRENNAN MCCLENDON MD; Attending Radiologist  This document has been electronically signed. 2023  5:09PM (23 @ 16:50)      CARDIOLOGY TESTING      MEDICATIONS  aspirin enteric coated 81 Oral daily  budesonide  80 MICROgram(s)/formoterol 4.5 MICROgram(s) Inhaler 2 Inhalation two times a day  cefTRIAXone   IVPB 1000 IV Intermittent every 24 hours  escitalopram 10 Oral at bedtime  folic acid 1 Oral daily  hydrALAZINE 50 Oral two times a day  magnesium gluconate 500 Oral daily  metoprolol succinate  Oral daily  ondansetron   Disintegrating Tablet 8 Oral every 8 hours  pantoprazole    Tablet 40 Oral before breakfast  polyethylene glycol 3350 17 Oral daily  senna 1 Oral two times a day  tiotropium 2.5 MICROgram(s) Inhaler 2 Inhalation daily        ANTIBIOTICS:  cefTRIAXone   IVPB 1000 milliGRAM(s) IV Intermittent every 24 hours      ALLERGIES:  Avelox (Other)  Vantin (Other (Mild))  Plaquenil (Other)  Arava (Anaphylaxis; Angioedema)  adhesives (Rash; Urticaria)

## 2023-06-19 NOTE — DISCHARGE NOTE NURSING/CASE MANAGEMENT/SOCIAL WORK - PATIENT PORTAL LINK FT
You can access the FollowMyHealth Patient Portal offered by [facility] by registering at the following website: [email or web address]. By joining [employer]’s FollowMyHealth portal, you will also be able to view your health information using other applications (apps) compatible with our system.

## 2023-06-20 ENCOUNTER — TRANSCRIPTION ENCOUNTER (OUTPATIENT)
Age: 53
End: 2023-06-20

## 2023-06-20 VITALS
TEMPERATURE: 98 F | OXYGEN SATURATION: 96 % | RESPIRATION RATE: 18 BRPM | DIASTOLIC BLOOD PRESSURE: 59 MMHG | SYSTOLIC BLOOD PRESSURE: 102 MMHG | HEART RATE: 75 BPM

## 2023-06-20 LAB
ALBUMIN SERPL ELPH-MCNC: 4.1 G/DL — SIGNIFICANT CHANGE UP (ref 3.5–5.2)
ALP SERPL-CCNC: 102 U/L — SIGNIFICANT CHANGE UP (ref 30–115)
ALT FLD-CCNC: 8 U/L — SIGNIFICANT CHANGE UP (ref 0–41)
ANION GAP SERPL CALC-SCNC: 10 MMOL/L — SIGNIFICANT CHANGE UP (ref 7–14)
APTT BLD: 66.8 SEC — HIGH (ref 27–39.2)
AST SERPL-CCNC: 16 U/L — SIGNIFICANT CHANGE UP (ref 0–41)
BASOPHILS # BLD AUTO: 0.04 K/UL — SIGNIFICANT CHANGE UP (ref 0–0.2)
BASOPHILS NFR BLD AUTO: 0.5 % — SIGNIFICANT CHANGE UP (ref 0–1)
BILIRUB SERPL-MCNC: 0.5 MG/DL — SIGNIFICANT CHANGE UP (ref 0.2–1.2)
BUN SERPL-MCNC: 19 MG/DL — SIGNIFICANT CHANGE UP (ref 10–20)
CALCIUM SERPL-MCNC: 9.3 MG/DL — SIGNIFICANT CHANGE UP (ref 8.4–10.5)
CHLORIDE SERPL-SCNC: 103 MMOL/L — SIGNIFICANT CHANGE UP (ref 98–110)
CO2 SERPL-SCNC: 27 MMOL/L — SIGNIFICANT CHANGE UP (ref 17–32)
CREAT SERPL-MCNC: 1 MG/DL — SIGNIFICANT CHANGE UP (ref 0.7–1.5)
EGFR: 68 ML/MIN/1.73M2 — SIGNIFICANT CHANGE UP
EOSINOPHIL # BLD AUTO: 0.28 K/UL — SIGNIFICANT CHANGE UP (ref 0–0.7)
EOSINOPHIL NFR BLD AUTO: 3.7 % — SIGNIFICANT CHANGE UP (ref 0–8)
GLUCOSE SERPL-MCNC: 89 MG/DL — SIGNIFICANT CHANGE UP (ref 70–99)
HCT VFR BLD CALC: 32.8 % — LOW (ref 37–47)
HCT VFR BLD CALC: 35 % — LOW (ref 37–47)
HGB BLD-MCNC: 10.4 G/DL — LOW (ref 12–16)
HGB BLD-MCNC: 11.1 G/DL — LOW (ref 12–16)
IMM GRANULOCYTES NFR BLD AUTO: 0.3 % — SIGNIFICANT CHANGE UP (ref 0.1–0.3)
INR BLD: 3.85 RATIO — HIGH (ref 0.65–1.3)
LYMPHOCYTES # BLD AUTO: 2.49 K/UL — SIGNIFICANT CHANGE UP (ref 1.2–3.4)
LYMPHOCYTES # BLD AUTO: 33.2 % — SIGNIFICANT CHANGE UP (ref 20.5–51.1)
MAGNESIUM SERPL-MCNC: 1.9 MG/DL — SIGNIFICANT CHANGE UP (ref 1.8–2.4)
MCHC RBC-ENTMCNC: 27.9 PG — SIGNIFICANT CHANGE UP (ref 27–31)
MCHC RBC-ENTMCNC: 27.9 PG — SIGNIFICANT CHANGE UP (ref 27–31)
MCHC RBC-ENTMCNC: 31.7 G/DL — LOW (ref 32–37)
MCHC RBC-ENTMCNC: 31.7 G/DL — LOW (ref 32–37)
MCV RBC AUTO: 87.9 FL — SIGNIFICANT CHANGE UP (ref 81–99)
MCV RBC AUTO: 87.9 FL — SIGNIFICANT CHANGE UP (ref 81–99)
MONOCYTES # BLD AUTO: 0.58 K/UL — SIGNIFICANT CHANGE UP (ref 0.1–0.6)
MONOCYTES NFR BLD AUTO: 7.7 % — SIGNIFICANT CHANGE UP (ref 1.7–9.3)
NEUTROPHILS # BLD AUTO: 4.1 K/UL — SIGNIFICANT CHANGE UP (ref 1.4–6.5)
NEUTROPHILS NFR BLD AUTO: 54.6 % — SIGNIFICANT CHANGE UP (ref 42.2–75.2)
NRBC # BLD: 0 /100 WBCS — SIGNIFICANT CHANGE UP (ref 0–0)
NRBC # BLD: 0 /100 WBCS — SIGNIFICANT CHANGE UP (ref 0–0)
PHOSPHATE SERPL-MCNC: 3.6 MG/DL — SIGNIFICANT CHANGE UP (ref 2.1–4.9)
PLATELET # BLD AUTO: 181 K/UL — SIGNIFICANT CHANGE UP (ref 130–400)
PLATELET # BLD AUTO: 224 K/UL — SIGNIFICANT CHANGE UP (ref 130–400)
PMV BLD: 11 FL — HIGH (ref 7.4–10.4)
PMV BLD: 11.2 FL — HIGH (ref 7.4–10.4)
POTASSIUM SERPL-MCNC: 4.3 MMOL/L — SIGNIFICANT CHANGE UP (ref 3.5–5)
POTASSIUM SERPL-SCNC: 4.3 MMOL/L — SIGNIFICANT CHANGE UP (ref 3.5–5)
PROT SERPL-MCNC: 6.6 G/DL — SIGNIFICANT CHANGE UP (ref 6–8)
PROTHROM AB SERPL-ACNC: >40 SEC — HIGH (ref 9.95–12.87)
RBC # BLD: 3.73 M/UL — LOW (ref 4.2–5.4)
RBC # BLD: 3.98 M/UL — LOW (ref 4.2–5.4)
RBC # FLD: 13.5 % — SIGNIFICANT CHANGE UP (ref 11.5–14.5)
RBC # FLD: 13.5 % — SIGNIFICANT CHANGE UP (ref 11.5–14.5)
SODIUM SERPL-SCNC: 140 MMOL/L — SIGNIFICANT CHANGE UP (ref 135–146)
WBC # BLD: 5.96 K/UL — SIGNIFICANT CHANGE UP (ref 4.8–10.8)
WBC # BLD: 7.51 K/UL — SIGNIFICANT CHANGE UP (ref 4.8–10.8)
WBC # FLD AUTO: 5.96 K/UL — SIGNIFICANT CHANGE UP (ref 4.8–10.8)
WBC # FLD AUTO: 7.51 K/UL — SIGNIFICANT CHANGE UP (ref 4.8–10.8)

## 2023-06-20 PROCEDURE — 99239 HOSP IP/OBS DSCHRG MGMT >30: CPT

## 2023-06-20 RX ORDER — WARFARIN SODIUM 2.5 MG/1
2 TABLET ORAL
Qty: 30 | Refills: 0
Start: 2023-06-20 | End: 2023-07-04

## 2023-06-20 RX ADMIN — Medication 500 MILLIGRAM(S): at 11:21

## 2023-06-20 RX ADMIN — PANTOPRAZOLE SODIUM 40 MILLIGRAM(S): 20 TABLET, DELAYED RELEASE ORAL at 05:40

## 2023-06-20 RX ADMIN — Medication 1 MILLIGRAM(S): at 11:17

## 2023-06-20 RX ADMIN — Medication 81 MILLIGRAM(S): at 11:17

## 2023-06-20 RX ADMIN — MORPHINE SULFATE 120 MILLIGRAM(S): 50 CAPSULE, EXTENDED RELEASE ORAL at 14:03

## 2023-06-20 RX ADMIN — TIOTROPIUM BROMIDE 2 PUFF(S): 18 CAPSULE ORAL; RESPIRATORY (INHALATION) at 09:36

## 2023-06-20 RX ADMIN — ONDANSETRON 8 MILLIGRAM(S): 8 TABLET, FILM COATED ORAL at 14:38

## 2023-06-20 RX ADMIN — POLYETHYLENE GLYCOL 3350 17 GRAM(S): 17 POWDER, FOR SOLUTION ORAL at 11:21

## 2023-06-20 RX ADMIN — MORPHINE SULFATE 120 MILLIGRAM(S): 50 CAPSULE, EXTENDED RELEASE ORAL at 06:27

## 2023-06-20 RX ADMIN — Medication 2 MILLIGRAM(S): at 14:41

## 2023-06-20 RX ADMIN — Medication 2 MILLIGRAM(S): at 05:34

## 2023-06-20 RX ADMIN — ONDANSETRON 8 MILLIGRAM(S): 8 TABLET, FILM COATED ORAL at 05:40

## 2023-06-20 RX ADMIN — BUDESONIDE AND FORMOTEROL FUMARATE DIHYDRATE 2 PUFF(S): 160; 4.5 AEROSOL RESPIRATORY (INHALATION) at 11:20

## 2023-06-20 RX ADMIN — Medication 100 MILLIGRAM(S): at 05:40

## 2023-06-20 NOTE — DISCHARGE NOTE PROVIDER - NSDCFUADDINST_GEN_ALL_CORE_FT
Please check daily INR to adjust warfarin dose. Take 4mg warfarin daily. If INR >4, take 2mg warfarin instead of 4mg.

## 2023-06-20 NOTE — PROGRESS NOTE ADULT - ASSESSMENT
52 year old woman with PMH of renal stones (urology Dr. Hernandez) SLE  lupus with lupus anticoagulant, antiphospholipid syndrome,  rheumatoid arthritis, cervical cancer sp hysterectomy,  laryngeal cancer sp CT, RT, HFpEF (06/21/19 EF 63%) s/p AICD+PPM, DVT/PE on coumadin,  NSTEMI in 2015, subclavian stenosis s/p stent placement, HTN, HLD, PPM/AICD, , and recent admission from 09/22-10/09 for proximal occlusion of left subclavian vein status post in-stent restenosis presenting to ER for evaluation of hematuria and bruising    ·	Hematuria possibly related to elevated INR  ·	LE and UE hematoma  ·	Coagulopathy due to coumadin  ·	Antiphospholipid syndrome  ·	Prediabetes  ·	H/o  DVT/PE  ·	SLE/RA      ·	Sepsis ruled out on admission   ·	Monitor off antibiotics , asymptomatic   ·	INR today 3.8(  hematology recommends range of 3 to 4). Will resume coumadin at 4 mg on discharge. Reduce dose to 2 mg if >4  ·	LE and UE hematoma-conservative management  ·	h/o long-QT syndrome and VT s/p AICD/HFpEF (06/21/19 EF 63%) s/p ppm / NSTEMI in 2015./Subclavian stenosis s/p stent placement  ·	C/w home meds (Aspirin 81, Hydralazine 50 TID, Mg 500 QD, Toprol  QD)   ·	Prediabetes- HbA1c 5.9. Diabetic diet  ·	Handoff: Stable for discharge today if repeat hemoglobin is stable

## 2023-06-20 NOTE — PROGRESS NOTE ADULT - REASON FOR ADMISSION
Hematuria + Bruising

## 2023-06-20 NOTE — DISCHARGE NOTE PROVIDER - NSDCMRMEDTOKEN_GEN_ALL_CORE_FT
albuterol 90 mcg/inh inhalation aerosol: 2 puff(s) inhaled every 6 hours, As Needed  ALPRAZolam 1 mg oral tablet: 2 tab(s) orally 3 times a day, As Needed  aspirin 81 mg oral delayed release tablet: 1 tab(s) orally once a day  cyanocobalamin 1000 mcg/mL injectable solution: 1 milliliter(s) injectable once a week  doxycycline hyclate 100 mg oral tablet: 1 tab(s) orally 2 times a day   folic acid 1 mg oral tablet: 1 tab(s) orally once a day  hydrALAZINE 50 mg oral tablet: 1 tab(s) orally 2 times a day  Lexapro 10 mg oral tablet: 1 tab(s) orally once a day (at bedtime)  magnesium gluconate 500 mg oral tablet: 1 tab(s) orally once a day   Metoprolol Succinate  mg oral tablet, extended release: 1 tab(s) orally once a day  MS Contin 60 mg oral tablet, extended release: 2 tab(s) orally 3 times a day (after meals), As Needed  Trelegy Ellipta 100 mcg-62.5 mcg-25 mcg/inh inhalation powder: 1 puff(s) inhaled once a day  warfarin 2.5 mg oral tablet: 2 tab(s) orally once a day    albuterol 90 mcg/inh inhalation aerosol: 2 puff(s) inhaled every 6 hours, As Needed  ALPRAZolam 1 mg oral tablet: 2 tab(s) orally 3 times a day, As Needed  aspirin 81 mg oral delayed release tablet: 1 tab(s) orally once a day  cyanocobalamin 1000 mcg/mL injectable solution: 1 milliliter(s) injectable once a week  folic acid 1 mg oral tablet: 1 tab(s) orally once a day  hydrALAZINE 50 mg oral tablet: 1 tab(s) orally 2 times a day  Lexapro 10 mg oral tablet: 1 tab(s) orally once a day (at bedtime)  magnesium gluconate 500 mg oral tablet: 1 tab(s) orally once a day   Metoprolol Succinate  mg oral tablet, extended release: 1 tab(s) orally once a day  MS Contin 60 mg oral tablet, extended release: 2 tab(s) orally 3 times a day (after meals), As Needed  Trelegy Ellipta 100 mcg-62.5 mcg-25 mcg/inh inhalation powder: 1 puff(s) inhaled once a day  warfarin 2 mg oral tablet: 2 tab(s) orally once a day Please check INR daily; If INR &gt;4, take 1 tab (2mg) instead of 2 tabs(4mg)

## 2023-06-20 NOTE — DISCHARGE NOTE PROVIDER - NSDCFUSCHEDAPPT_GEN_ALL_CORE_FT
[facility] Physician [facility]  ONCPAINMGT [id number]  Scheduled Appointment: 06/23/2023    Power Hanna  [facility]  OTOLARYNG [id number]  Scheduled Appointment: 06/30/2023

## 2023-06-20 NOTE — PROGRESS NOTE ADULT - PROVIDER SPECIALTY LIST ADULT
Hospitalist
Hospitalist
Internal Medicine
Hospitalist
Hospitalist
Internal Medicine
Hospitalist
Hospitalist
Internal Medicine

## 2023-06-20 NOTE — DISCHARGE NOTE PROVIDER - HOSPITAL COURSE
52 year old woman with PMH of renal stones (urology Dr. Hernandez) SLE  lupus with lupus anticoagulant, antiphospholipid syndrome,  rheumatoid arthritis, cervical cancer sp hysterectomy,  laryngeal cancer sp CT, RT, HFpEF (06/21/19 EF 63%) s/p AICD+PPM, DVT/PE on coumadin,  NSTEMI in 2015, subclavian stenosis s/p stent placement, HTN, HLD, PPM/AICD, , and recent admission from 09/22-10/09 for proximal occlusion of left subclavian vein status post in-stent restenosis presenting to ER for evaluation of hematuria and bruising.    Problems:  ·Hematuria possibly related to elevated INR  ·LE and UE hematoma  ·Coagulopathy due to coumadin  ·Antiphospholipid syndrome  ·Prediabetes  ·H/o  DVT/PE  ·SLE/RA    Hospital course:  ·Sepsis ruled out on admission; she was asymptomatic without antibiotics.   ·INR today 3.85 ( pt targets 3-4 per hematology).   - Will discharge with coumadin at 4mg daily with target INR 3 to 4; If INR >4, reduce warfarin dose to 2mg.  ·LE and UE hematoma-conservative management  ·h/o long-QT syndrome and VT s/p AICD/HFpEF (06/21/19 EF 63%) s/p ppm / NSTEMI in 2015./Subclavian stenosis s/p stent placement  ·C/w home meds (Aspirin 81, Hydralazine 50 TID, Mg 500 QD, Toprol  QD)   ·Prediabetes- HbA1c 5.9. Diabetic diet    She remained stable over the admission course. Her hematuria subsided and bruises remained same size. She had some spotting per rectum, however the hemoglobin remained stable. She was hence discharged with recommendations.      52 year old woman with PMH of renal stones (urology Dr. Hernandez) SLE  lupus with lupus anticoagulant, antiphospholipid syndrome,  rheumatoid arthritis, cervical cancer sp hysterectomy,  laryngeal cancer sp CT, RT, HFpEF (06/21/19 EF 63%) s/p AICD+PPM, DVT/PE on coumadin,  NSTEMI in 2015, subclavian stenosis s/p stent placement, HTN, HLD, PPM/AICD, , and recent admission from 09/22-10/09 for proximal occlusion of left subclavian vein status post in-stent restenosis presenting to ER for evaluation of hematuria and bruising.    Problems:  ·Hematuria possibly related to elevated INR  ·LE and UE hematoma  ·Coagulopathy due to coumadin  ·Antiphospholipid syndrome  ·Prediabetes  ·H/o  DVT/PE  ·SLE/RA    Hospital course:  ·Sepsis ruled out on admission; she was asymptomatic without antibiotics.   ·INR today 3.85 ( pt targets 3-4 per hematology).   - Will discharge with coumadin at 4mg daily with target INR 3 to 4; If INR >4, reduce warfarin dose to 2mg.  ·LE and UE hematoma-conservative management  ·h/o long-QT syndrome and VT s/p AICD/HFpEF (06/21/19 EF 63%) s/p ppm / NSTEMI in 2015./Subclavian stenosis s/p stent placement  ·C/w home meds (Aspirin 81, Hydralazine 50 TID, Mg 500 QD, Toprol  QD)   ·Prediabetes- HbA1c 5.9. Diabetic diet    She remained stable over the admission course. Her hematuria subsided and bruises remained same size. She had some spotting per rectum, however the hemoglobin remained stable. She was hence discharged with recommendations.     Attending Attestation:  Patient was seen & examined independently. At least 10 systems were reviewed in ROS. All systems reviewed  are within normal limits. Latest vital signs and labs were reviewed today. Case was discussed with house staff in morning rounds for assessment and plan.  Patient is medically stable for discharge . About 36 mins spent on discharge disposition.

## 2023-06-20 NOTE — DISCHARGE NOTE PROVIDER - NSDCCPCAREPLAN_GEN_ALL_CORE_FT
PRINCIPAL DISCHARGE DIAGNOSIS  Diagnosis: Anticoagulant-induced hematuria  Assessment and Plan of Treatment: You presented with bleeding in the urine and bruising over thigh and arms. Afetr evaluation, we found that your INR was higher than the desired range. We adjusted your warfarin dose to meet the therapeutic levels. Your hemoglobin remained stable and we discahrged with recommendations.      SECONDARY DISCHARGE DIAGNOSES  Diagnosis: Hematuria  Assessment and Plan of Treatment:

## 2023-06-20 NOTE — PROGRESS NOTE ADULT - SUBJECTIVE AND OBJECTIVE BOX
Progress Note:  Provider Speciality                            Hospitalist      ILEANA MCNEIL MRN-[id number] 52y Female     CHIEF PRESENTING COMPLAINT:  Patient is a 52y old  Female who presents with a chief complaint of Hematuria + Bruising (15 Geovani 2023 15:33)        SUBJECTIVE:  Patient was seen and examined at bedside.  Reports mild spotting of swipe with blood in AM which could be due to hematuria rather than GIB  No significant overnight events reported.     HISTORY OF PRESENTING ILLNESS:  HPI:  HPI: 52 year old woman with PMH of renal stones (urology Dr. Hernandez) SLE  lupus with lupus anticoagulant, antiphospholipid syndrome,  rheumatoid arthritis, cervical cancer sp hysterectomy,  laryngeal cancer sp CT, RT, HFpEF (19 EF 63%) s/p AICD+PPM, DVT/PE on coumadin,  NSTEMI in , subclavian stenosis s/p stent placement, HTN, HLD, PPM/AICD, , and recent admission from -10/09 for proximal occlusion of left subclavian vein status post in-stent restenosis presenting to ER for evaluation of hematuria and bruising. Pt sts for the last 5 days has had hematuria ([employer] colored urine) with assoc lower abd pain. + fever yesterday 100.5. Pt notes INR yesterday 5.8 (last coumadin dose 23). No vomiting, chest pain, sob, alterations in bowel habits, syncope, HOLDER. Patient also report bruises on both arms and feet.     In the ER:   VS: /68, , RR 20, T 37 oral, SpO2 98% RA  EKG: Atrial-paced rhythm with prolonged AV conduction  Sig Labs: WBC 7.46, Hgb 12.4, , INR 4.95, Cr 1.1 eGFR 60, UA +ve for Blood, LE, & Joby  Imagin) CT A/P: Partially imaged subcutaneous stranding in the right anterior thigh soft tissue. Correlates with hematoma on physical exam.     In the ER: Aztreonam 2g, Zofran 4      (2023 20:18)        REVIEW OF SYSTEMS:  Patient denies  headache, fever, chills. Denies chest pain, shortness of breath, palpitation. Denies nausea, vomiting, abdominal pain or diarrhoea, Denies dysuria.   At least 10 systems were reviewed in ROS. All systems reviewed  are within normal limits except for the complaints as described in Subjective.    PAST MEDICAL & SURGICAL HISTORY:  PAST MEDICAL & SURGICAL HISTORY:  Lupus      RA (rheumatoid arthritis)      HTN (hypertension)      CHF (congestive heart failure)      COPD (chronic obstructive pulmonary disease)      DVT (deep venous thrombosis)      Pulmonary embolism      History of laryngeal cancer      GERD (gastroesophageal reflux disease)      Cervical cancer      S/P appendectomy      S/P cholecystectomy      AICD (automatic cardioverter/defibrillator) present      S/P hysterectomy      History of back surgery      H/O foot surgery      S/P eye surgery      Subclavian arterial stenosis              VITAL SIGNS:  Vital Signs Last 24 Hrs  T(C): 36.8 (2023 07:49), Max: 36.8 (2023 15:50)  T(F): 98.2 (2023 07:49), Max: 98.2 (2023 15:50)  HR: 69 (2023 07:49) (69 - 72)  BP: 103/51 (2023 07:49) (96/55 - 110/66)  BP(mean): --  RR: 19 (2023 07:49) (18 - 19)  SpO2: 95% (2023 00:57) (95% - 95%)    Parameters below as of 2023 00:57  Patient On (Oxygen Delivery Method): room air                PHYSICAL EXAMINATION:  Not in acute distress  General: No icterus  HEENT:   no JVD.  Heart: S1+S2 audible  Lungs: bilateral  moderate air entry, no wheezing, no crepitations.  Abdomen: Soft, non-tender, non-distended , no  rigidity or guarding.  CNS: Awake alert, CN  grossly intact.  Extremities:  No edema     Skin:  ecchymoses LUCIUS, LE         CONSULTS:  Consultant(s) Notes Reviewed by me.   Care Discussed with Consultants/Other Providers where required.    All the images and labs were reviewed today        MEDICATIONS:  MEDICATIONS  (STANDING):  aspirin enteric coated 81 milliGRAM(s) Oral daily  budesonide  80 MICROgram(s)/formoterol 4.5 MICROgram(s) Inhaler 2 Puff(s) Inhalation two times a day  escitalopram 10 milliGRAM(s) Oral at bedtime  folic acid 1 milliGRAM(s) Oral daily  hydrALAZINE 50 milliGRAM(s) Oral two times a day  magnesium gluconate 500 milliGRAM(s) Oral daily  metoprolol succinate  milliGRAM(s) Oral daily  ondansetron   Disintegrating Tablet 8 milliGRAM(s) Oral every 8 hours  pantoprazole    Tablet 40 milliGRAM(s) Oral before breakfast  polyethylene glycol 3350 17 Gram(s) Oral daily  senna 1 Tablet(s) Oral two times a day  tiotropium 2.5 MICROgram(s) Inhaler 2 Puff(s) Inhalation daily    MEDICATIONS  (PRN):  acetaminophen     Tablet .. 650 milliGRAM(s) Oral every 6 hours PRN Temp greater or equal to 38C (100.4F), Mild Pain (1 - 3)  albuterol    90 MICROgram(s) HFA Inhaler 2 Puff(s) Inhalation every 6 hours PRN Shortness of Breath  ALPRAZolam 2 milliGRAM(s) Oral three times a day PRN Anxiety  morphine ER Tablet 120 milliGRAM(s) Oral three times a day PRN severe Pain

## 2023-06-23 ENCOUNTER — APPOINTMENT (OUTPATIENT)
Dept: PAIN MANAGEMENT | Facility: CLINIC | Age: 53
End: 2023-06-23

## 2023-06-25 DIAGNOSIS — R31.0 GROSS HEMATURIA: ICD-10-CM

## 2023-06-25 DIAGNOSIS — M32.9 SYSTEMIC LUPUS ERYTHEMATOSUS, UNSPECIFIED: ICD-10-CM

## 2023-06-25 DIAGNOSIS — Z86.711 PERSONAL HISTORY OF PULMONARY EMBOLISM: ICD-10-CM

## 2023-06-25 DIAGNOSIS — J44.9 CHRONIC OBSTRUCTIVE PULMONARY DISEASE, UNSPECIFIED: ICD-10-CM

## 2023-06-25 DIAGNOSIS — Z95.810 PRESENCE OF AUTOMATIC (IMPLANTABLE) CARDIAC DEFIBRILLATOR: ICD-10-CM

## 2023-06-25 DIAGNOSIS — M06.9 RHEUMATOID ARTHRITIS, UNSPECIFIED: ICD-10-CM

## 2023-06-25 DIAGNOSIS — Z79.01 LONG TERM (CURRENT) USE OF ANTICOAGULANTS: ICD-10-CM

## 2023-06-25 DIAGNOSIS — I50.32 CHRONIC DIASTOLIC (CONGESTIVE) HEART FAILURE: ICD-10-CM

## 2023-06-25 DIAGNOSIS — Z79.82 LONG TERM (CURRENT) USE OF ASPIRIN: ICD-10-CM

## 2023-06-25 DIAGNOSIS — K21.9 GASTRO-ESOPHAGEAL REFLUX DISEASE WITHOUT ESOPHAGITIS: ICD-10-CM

## 2023-06-25 DIAGNOSIS — Z86.718 PERSONAL HISTORY OF OTHER VENOUS THROMBOSIS AND EMBOLISM: ICD-10-CM

## 2023-06-25 DIAGNOSIS — D68.61 ANTIPHOSPHOLIPID SYNDROME: ICD-10-CM

## 2023-06-25 DIAGNOSIS — R73.03 PREDIABETES: ICD-10-CM

## 2023-06-25 DIAGNOSIS — E78.5 HYPERLIPIDEMIA, UNSPECIFIED: ICD-10-CM

## 2023-06-25 DIAGNOSIS — I11.0 HYPERTENSIVE HEART DISEASE WITH HEART FAILURE: ICD-10-CM

## 2023-06-25 DIAGNOSIS — T45.515A ADVERSE EFFECT OF ANTICOAGULANTS, INITIAL ENCOUNTER: ICD-10-CM
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
## 2023-06-25 DIAGNOSIS — J98.11 ATELECTASIS: ICD-10-CM

## 2023-06-25 DIAGNOSIS — M79.81 NONTRAUMATIC HEMATOMA OF SOFT TISSUE: ICD-10-CM

## 2023-06-25 DIAGNOSIS — D68.32 HEMORRHAGIC DISORDER DUE TO EXTRINSIC CIRCULATING ANTICOAGULANTS: ICD-10-CM

## 2023-06-28 NOTE — ED PROVIDER NOTE - HIV OFFER
Previously Declined (within the last year) Suturegard Body: The suture ends were repeatedly re-tightened and re-clamped to achieve the desired tissue expansion.

## 2023-07-28 NOTE — PATIENT PROFILE ADULT - NSPROGENBLOODRESTRICT_GEN_A_NUR
pt with hypoglycemic episode with AMS - improved after dextrose and otherwise noted to have leukocytosis - likely secondary to UTI, noted signs of cystitis and renal inflammation on CT  - will admit for further care with Dr Enrique and medicine team. Started in ED with abx for leukocytosis none

## 2023-08-02 ENCOUNTER — APPOINTMENT (OUTPATIENT)
Dept: OBGYN | Facility: CLINIC | Age: 53
End: 2023-08-02
Payer: MEDICARE

## 2023-08-02 VITALS
SYSTOLIC BLOOD PRESSURE: 133 MMHG | DIASTOLIC BLOOD PRESSURE: 84 MMHG | HEART RATE: 106 BPM | BODY MASS INDEX: 28.88 KG/M2 | WEIGHT: 163 LBS | HEIGHT: 63 IN

## 2023-08-02 DIAGNOSIS — Z01.419 ENCOUNTER FOR GYNECOLOGICAL EXAMINATION (GENERAL) (ROUTINE) W/OUT ABNORMAL FINDINGS: ICD-10-CM

## 2023-08-02 DIAGNOSIS — N94.2 VAGINISMUS: ICD-10-CM

## 2023-08-02 DIAGNOSIS — Z12.39 ENCOUNTER FOR OTHER SCREENING FOR MALIGNANT NEOPLASM OF BREAST: ICD-10-CM

## 2023-08-02 LAB
BILIRUB UR QL STRIP: NORMAL
CLARITY UR: NORMAL
COLLECTION METHOD: NORMAL
GLUCOSE UR-MCNC: NORMAL
HCG UR QL: 1 EU/DL
HGB UR QL STRIP.AUTO: NORMAL
KETONES UR-MCNC: NORMAL
LEUKOCYTE ESTERASE UR QL STRIP: NORMAL
NITRITE UR QL STRIP: NORMAL
PH UR STRIP: 5.5
PROT UR STRIP-MCNC: NORMAL
SP GR UR STRIP: 1.02

## 2023-08-02 PROCEDURE — 81003 URINALYSIS AUTO W/O SCOPE: CPT | Mod: QW

## 2023-08-02 PROCEDURE — G0101: CPT

## 2023-08-02 PROCEDURE — 99213 OFFICE O/P EST LOW 20 MIN: CPT | Mod: 25

## 2023-08-02 NOTE — HISTORY OF PRESENT ILLNESS
[FreeTextEntry1] : Patient in office for annual exam.  Patient complains of introital dyspareunia and vaginismus. history of hysterectomy and unilateral salpingoophorectomy. history of lupus, thrombophilia and anti phospholipid syndrome.

## 2023-08-02 NOTE — PLAN
[FreeTextEntry1] : I recommended trial of vaginal dilators and referred to the vulvo disease center in [facility]

## 2023-08-02 NOTE — PHYSICAL EXAM
[Examination Of The Breasts] : a normal appearance [Normal] : normal [No Masses] : no breast masses were palpable [Absent] : absent [Uterine Adnexae] : non-palpable

## 2023-08-07 ENCOUNTER — LABORATORY RESULT (OUTPATIENT)
Age: 53
End: 2023-08-07

## 2023-08-07 ENCOUNTER — RESULT CHARGE (OUTPATIENT)
Age: 53
End: 2023-08-07

## 2023-08-07 ENCOUNTER — APPOINTMENT (OUTPATIENT)
Dept: PAIN MANAGEMENT | Facility: CLINIC | Age: 53
End: 2023-08-07
Payer: MEDICARE

## 2023-08-07 VITALS
DIASTOLIC BLOOD PRESSURE: 81 MMHG | SYSTOLIC BLOOD PRESSURE: 124 MMHG | HEART RATE: 76 BPM | WEIGHT: 163 LBS | HEIGHT: 63 IN | BODY MASS INDEX: 28.88 KG/M2

## 2023-08-07 PROCEDURE — 99213 OFFICE O/P EST LOW 20 MIN: CPT

## 2023-08-07 PROCEDURE — 80305 DRUG TEST PRSMV DIR OPT OBS: CPT | Mod: QW

## 2023-08-07 RX ORDER — WARFARIN 5 MG/1
5 TABLET ORAL DAILY
Qty: 60 | Refills: 3 | Status: DISCONTINUED | COMMUNITY
Start: 2022-05-26 | End: 2023-08-07

## 2023-08-07 RX ORDER — ENOXAPARIN SODIUM 300 MG/3ML
INJECTION INTRAVENOUS; SUBCUTANEOUS
Refills: 0 | Status: ACTIVE | COMMUNITY

## 2023-08-07 NOTE — ASSESSMENT
[FreeTextEntry1] : 52-year-old female with chronic pain. She has a history of lupus and APS. Patient returns to the office after prolonged ICU admission secondary to coumadin overdose. She continues with multi-site pains. She will utilize MS Contin 60 mg 2 tabs TID Morphine Sulfate IR 15 mg BID. The patient is stable on current pain medication with analgesia and without notable side effects or any obvious aberrant behaviors exhibited. Will renew medication today. She will f/u in 4 weeks for re evaluation.  Overall there is at least a 30-50% reduction in pain with the prescribed analgesics. The patient denies any adverse side effects due to the medication (sleeping disturbance, constipation, sleepiness, hallucinations and/or urination problems).   Check of the  registry reveals compliance in regards to narcotic medication management use  Urine drug screening collected today with rapid sample result consistent with given regimen. Sample to be sent for confirmatory testing.  ROSIE Childress MD

## 2023-08-07 NOTE — HISTORY OF PRESENT ILLNESS
[FreeTextEntry1] : ORIGINAL PRESENTATION: Ms. Bauman is a 52-year-old woman who has been a patient in our office since 1996. At that time, she presented with complaints of lower back pain during pregnancy. Ultimately, the patient underwent a right L4-5 laminectomy over 20 years ago by Dr. Andre Carter. However, she still struggles with chronic pain. The patient has been medically managed at that and has trialed various preparations including Lortab, Vicodin, Dilaudid, Morphine, OxyContin, and methadone. She also has multiple complaints of pain secondary to a pacemaker having been placed. She also claims to have a history of throat cancer; however, we do not have any evidence of any cancer diagnosis. She also has a history of connective tissue disorders such as lupus and rheumatoid arthritis.  TODAY: I had the pleasure of seeing Ms. Bauman today in follow up. Her previous history and physical findings have been reviewed.  She is under our care for chronic pain secondary to lupus and lumbar issues which she is receiving continuing active treatment for. She was also diagnosed with antiphospholipid syndrome which unfortunately has led to frequent hospitalizations as of late. She was last seen on 4/24/23 and since then was hospitalized due to coumadin overdose. She was admitted to the ICU and administered platelets and Vitamin K. Her pains were managed at the hospital with morphine. Overall, her pain has been elevated secondary to the prolonged hospital stay. She is medically managed with MS Contin 60 mg 2 tabs TID in conjunction with morphine sulfate 15 mg BID. With this medication regimen she is able to function and preform her ADLs more comfortably. She wishes to continue as is without change.

## 2023-08-18 LAB
PM 6 MAM: NEGATIVE NG/ML
PM 7-AMINO-CLONAZ: NEGATIVE NG/ML
PM ALPHA-HYDROXY-ALPRAZOLAM: NEGATIVE NG/ML
PM ALPHA-HYDROXY-MIDAZOLAM: NEGATIVE NG/ML
PM ALPRAZOLAM: >1000 NG/ML
PM AMOBARBITAL: NEGATIVE NG/ML
PM AMPHETAMINE INTERP: NEGATIVE
PM AMPHETAMINE: NEGATIVE NG/ML
PM BARBURATES INTERP: NEGATIVE
PM BEG: NEGATIVE NG/ML
PM BENZODIAZEPINES INTERP: POSITIVE
PM BUPRENORPHINE INTERP: NEGATIVE
PM BUPRENORPHINE: NEGATIVE NG/ML
PM BUTALBITAL: NEGATIVE NG/ML
PM CLONAZEPAM: NEGATIVE NG/ML
PM COCAINE INTERP: NEGATIVE
PM COCAINE: NEGATIVE NG/ML
PM CODIENE: NEGATIVE NG/ML
PM COTININE: >1000 NG/ML
PM DIAZEPAM: NEGATIVE NG/ML
PM DIHYROCODEINE: NEGATIVE NG/ML
PM EDDP: NEGATIVE NG/ML
PM FENTANYL INTERP: NEGATIVE NG/ML
PM FENTANYL: NEGATIVE NG/ML
PM FLUNITRAZEPAM: NEGATIVE NG/ML
PM FLURAZEPAM: NEGATIVE NG/ML
PM HYDROCODONE: NEGATIVE NG/ML
PM HYDROMORPHONE: NEGATIVE NG/ML
PM LORAZEPAM: NEGATIVE NG/ML
PM MARIJUANA (DELTA-9-THC): NEGATIVE NG/ML
PM MARIJUANA INTERP: NEGATIVE
PM MDA: NEGATIVE NG/ML
PM MDEA: NEGATIVE NG/ML
PM MDMA: NEGATIVE NG/ML
PM MEPERIDINE: NEGATIVE NG/ML
PM METHADONE INTERP: NEGATIVE
PM METHADONE: NEGATIVE NG/ML
PM METHAMPHETAMINE: NEGATIVE NG/ML
PM MIDAZOLAM: NEGATIVE NG/ML
PM MORPHINE: NEGATIVE NG/ML
PM NALOXONE: NEGATIVE NG/ML
PM NALTREXONE: NEGATIVE NG/ML
PM NICOTINE INTERP: POSITIVE
PM NORBUPRENORPHINE: NEGATIVE NG/ML
PM NORDIAZEPAM: NEGATIVE NG/ML
PM NORMEPERIDINE: NEGATIVE NG/ML
PM NOROXYCODONE: NEGATIVE NG/ML
PM OPIOID INTERP: NEGATIVE
PM OXAZEPAM: NEGATIVE NG/ML
PM OXXYCODONE INTERP: NEGATIVE
PM OXYCODONE: NEGATIVE NG/ML
PM OXYMORPHONE: NEGATIVE NG/ML
PM PCP: NEGATIVE NG/ML
PM PHENCYCLIDINE INTERP: NEGATIVE
PM PHENOBARBITAL: NEGATIVE NG/ML
PM PPX: NEGATIVE NG/ML
PM PROPOXYPHENE INTERP: NEGATIVE
PM SECOBARBITAL: NEGATIVE NG/ML
PM SUFENTANIL: NEGATIVE NG/ML
PM TAPENTADOL: NEGATIVE NG/ML
PM TEMAZEPAM: NEGATIVE NG/ML
PM TRAMADOL INTERP: NEGATIVE
PM TRAMADOL: NEGATIVE NG/ML

## 2023-08-21 ENCOUNTER — APPOINTMENT (OUTPATIENT)
Dept: OTOLARYNGOLOGY | Facility: CLINIC | Age: 53
End: 2023-08-21

## 2023-08-22 NOTE — PROGRESS NOTE ADULT - ASSESSMENT
A 48 years old female with PMH of multiple medical problems presented to the ED with syncopal episode. Pt states that while going to bedroom she suddenly felt her head spinning and then became nauseous and threw up. After vomiting she felt better but passed out for about 5 minutes. No cp. No palpitations. No sob. C/O cough with sputum. No urinary complaints. No fever.    CBC, BMP: WNL  PCO2 on VB  CT head: Unremarkable  CT abd/pelvis: Unremarkable  CTA chest: No PE  EKG: Sinus tach @ 113/min. Poor R wave progression. (Interpreted by me.)      1. Syncope  2. SLE / RA  3. Ch. CHF unspecified (ECHO pending)  4. S/P AICD  5. H/O COPD  6. Ch. Hypercapnic respiratory failure  7. H/O throat Ca.  8. HTN         PLAN:    . No events on device interrogation  . ECHO reviewed. NLVF. EF 63%  . Care D/W the cardiologist Dr. Robert. No further cardiac W/U. Cleared for D/C  . Pulmonary eval noted and care D/W the pulmonary.   . Complete 7 days course of Abx as per pulmonary. Prednisone 40 mg po daily for five days  . Pain management eval noted. Pt is advised to F/U with her pain management doctor as an out pt.  . Anticipate D/C in AM  . Cont her all home meds on D/C A 48 years old female with PMH of multiple medical problems presented to the ED with syncopal episode. Pt states that while going to bedroom she suddenly felt her head spinning and then became nauseous and threw up. After vomiting she felt better but passed out for about 5 minutes. No cp. No palpitations. No sob. C/O cough with sputum. No urinary complaints. No fever.    CBC, BMP: WNL  PCO2 on VB  CT head: Unremarkable  CT abd/pelvis: Unremarkable  CTA chest: No PE  EKG: Sinus tach @ 113/min. Poor R wave progression. (Interpreted by me.)      1. Syncope  2. SLE / RA  3. Ch. CHF unspecified (ECHO pending)  4. S/P AICD  5. H/O COPD  6. Ch. Hypercapnic respiratory failure  7. H/O throat Ca.  8. HTN         PLAN:    . No events on device interrogation  . ECHO reviewed. NLVF. EF 63%  . Care D/W the cardiologist Dr. Robert. No further cardiac W/U. Cleared for D/C  . Pulmonary eval noted and care D/W the pulmonary.   . Complete 7 days course of Abx as per pulmonary. Will give her IV Rocephin for two days & Cefpodoxime 200 mg po q 12h for five days on D/C.  Prednisone 40 mg po daily for five days  . Pain management eval noted. Pt is advised to F/U with her pain management doctor as an out pt.  . Anticipate D/C in AM  . Cont her all home meds on D/C A 48 years old female with PMH of multiple medical problems presented to the ED with syncopal episode. Pt states that while going to bedroom she suddenly felt her head spinning and then became nauseous and threw up. After vomiting she felt better but passed out for about 5 minutes. No cp. No palpitations. No sob. C/O cough with sputum. No urinary complaints. No fever.    CBC, BMP: WNL  PCO2 on VB  CT head: Unremarkable  CT abd/pelvis: Unremarkable  CTA chest: No PE  EKG: Sinus tach @ 113/min. Poor R wave progression. (Interpreted by me.)      1. Syncope  2. SLE / RA  3. Ch. CHF unspecified (ECHO pending)  4. S/P AICD  5. H/O COPD  6. Ch. Hypercapnic respiratory failure  7. H/O throat Ca.  8. HTN  9. Ac. Bronchitis         PLAN:    . No events on device interrogation  . ECHO reviewed. NLVF. EF 63%  . Care D/W the cardiologist Dr. Robert. No further cardiac W/U. Cleared for D/C  . Pulmonary eval noted and care D/W the pulmonary.   . Complete 7 days course of Abx as per pulmonary. Will give her IV Rocephin for two days & Cefpodoxime 200 mg po q 12h for five days on D/C.  Prednisone 40 mg po daily for five days  . Pain management eval noted. Pt is advised to F/U with her pain management doctor as an out pt.  . Anticipate D/C in AM  . Cont her all home meds on D/C A 48 years old female with PMH of multiple medical problems presented to the ED with syncopal episode. Pt states that while going to bedroom she suddenly felt her head spinning and then became nauseous and threw up. After vomiting she felt better but passed out for about 5 minutes. No cp. No palpitations. No sob. C/O cough with sputum. No urinary complaints. No fever.    CBC, BMP: WNL  PCO2 on VB  CT head: Unremarkable  CT abd/pelvis: Unremarkable  CTA chest: No PE  EKG: Sinus tach @ 113/min. Poor R wave progression. (Interpreted by me.)      1. Syncope  2. SLE / RA  3. Ch. CHF unspecified (ECHO pending)  4. S/P AICD  5. H/O COPD  6. Ch. Hypercapnic respiratory failure  7. H/O throat Ca.  8. HTN  9. Ac. Bronchitis         PLAN:    . No events on device interrogation  . ECHO reviewed. NLVF. EF 63%  . Care D/W the cardiologist Dr. Robert. No further cardiac W/U. Cleared for D/C  . Pulmonary eval noted and care D/W the pulmonary.   . Will start her on Doxycycline 100 mg ivpb q 12h. On D/C 100 mg po q 12h for six days.  . Prednisone 40 mg po daily for five days on D/C.  . Pain management eval noted. Pt is advised to F/U with her pain management doctor as an out pt.  . Anticipate D/C in AM  . Cont her all home meds on D/C A 48 years old female with PMH of multiple medical problems presented to the ED with syncopal episode. Pt states that while going to bedroom she suddenly felt her head spinning and then became nauseous and threw up. After vomiting she felt better but passed out for about 5 minutes. No cp. No palpitations. No sob. C/O cough with sputum. No urinary complaints. No fever.    CBC, BMP: WNL  PCO2 on VB  CT head: Unremarkable  CT abd/pelvis: Unremarkable  CTA chest: No PE  EKG: Sinus tach @ 113/min. Poor R wave progression. (Interpreted by me.)      1. Syncope likely due to Narcotics  2. SLE / RA  3. Ch. CHF unspecified (ECHO pending)  4. S/P AICD  5. H/O COPD  6. Ch. Hypercapnic respiratory failure  7. H/O throat Ca.  8. HTN  9. Ac. Bronchitis         PLAN:    . No events on device interrogation  . ECHO reviewed. NLVF. EF 63%  . Care D/W the cardiologist Dr. Robert. No further cardiac W/U. Cleared for D/C  . Pulmonary eval noted and care D/W the pulmonary.   . Will start her on Doxycycline 100 mg ivpb q 12h. On D/C 100 mg po q 12h for six days.  . Prednisone 40 mg po daily for five days on D/C.  . Pain management eval noted. Pt is advised to F/U with her pain management doctor as an out pt.  . Anticipate D/C in AM  . Cont her all home meds on D/C No

## 2023-09-01 NOTE — H&P PST ADULT - HISTORY OF PRESENT ILLNESS
Addended by: LUIS COLLINS on: 9/1/2023 10:14 AM     Modules accepted: Orders     52 y/o female presents to PAST in preparation for left upper extremity venogram, possible endovascular revascularization with Dr. Guan in [facility] under LSB on 3/18/22     Pt states that she has had left arm pain and swelling intermittently since 12/20. Pt had a left a left subclavian vein venoplasty on 3/2/21. Some improvement at that time. Pt with new left arm swelling that started 3 weeks ago. Pt stated that left arm continues to be swollen, and tender to touch. It was recommended for pt to have above procedure due to symptoms.   Pt with hx of laryngeal cancer, s/p chemo and radiation tx, h/o of AICD placement initially in 2003 and replaced in 2021.   Pt with hx of copd, last exacerbation 1 week ago, pt on home O2 2lts as needed.  Pt with continued edema to left arm as well as left lower extremity. Pt states that she had a doppler last week as well. Pt with b/p o 176/80 during visit.  I’ve discussed with the patient the abnormality found during their pre-procedure evaluation.  They have been told to go to the ER for further evaluation but refuse.  I have discussed the risks including the possibility of worsening disease, pain, permanent disability, and/or death with them.  They voiced understanding of this to me.  I advised them that they can and should go immediately if they develop any worse/different/additional symptoms, or if they change their mind and want to continue their evaluation as we discussed.     PATIENT CURRENTLY DENIES CHEST PAIN  SHORTNESS OF BREATH  PALPITATIONS,  DYSURIA, OR UPPER RESPIRATORY INFECTION IN PAST 2 WEEKS  EXERCISE  TOLERANCE  1/2 FLIGHT OF STAIRS  WITHOUT SHORTNESS OF BREATH  pt denies any covid s/s, or tested positive in the past  pt advised self quarantine till day of procedure  Patient verbalized understanding of instructions and was given the opportunity to ask questions and have them answered.  As per patient, this is their complete medical and surgical history, including medications both prescribed or over the counter.  written and verbal instructions with teach back on chlorhexidine shampoo provided,  pt verbalized understanding with returned demonstration    Anesthesia Alert  NO--Difficult Airway  Yes--History of neck surgery or radiation, radiation to throat in 2011  NO--Limited ROM of neck  NO--History of Malignant hyperthermia  NO--Personal or family history of Pseudocholinesterase deficiency.  Yes--Prior Anesthesia Complication, PONV  NO--Latex Allergy  NO--Loose teeth, upper and lower dentures   Yes--History of Rheumatoid Arthritis  NO--LUCY  Yes--Bleeding risk, on xarelto   NO--Other_____    M79.89 [id number]    ^M79.89 [id number]    H/o or current diagnosis of HF- ACEI/ARB contraindication unknown    Family history of cervical cancer    FH: thyroid cancer    Lupus    AICD (automatic cardioverter/defibrillator) present    Throat cancer    RA (rheumatoid arthritis)    HTN (hypertension)    CHF (congestive heart failure)    COPD (chronic obstructive pulmonary disease)    DVT (deep venous thrombosis)    Pulmonary embolus    Pulmonary embolism    History of laryngeal cancer    S/P appendectomy    S/P cholecystectomy    History of laryngeal cancer    AICD (automatic cardioverter/defibrillator) present    S/P hysterectomy    History of back surgery    H/O foot surgery    S/P eye surgery     52 y/o female presents to PAST in preparation for left upper extremity venogram, possible endovascular revascularization with Dr. Guan in [facility] under [facility] on 3/18/22     Pt states that she has had left arm pain and swelling intermittently since 12/20. Pt had a left subclavian vein venoplasty on 3/2/21. Some improvement at that time. Pt with new left arm swelling that started 3 weeks ago. Pt stated that left arm continues to be swollen, and tender to touch. It was recommended for pt to have above procedure due to symptoms.   Pt with hx of laryngeal cancer, s/p chemo and radiation tx, h/o of AICD placement initially in 2003 and replaced in 2021.   Pt with hx of copd, last exacerbation 1 week ago, pt on home O2 2lts as needed.  Pt with continued edema to left arm as well as left lower extremity. Pt states that she had a doppler last week as well. Pt with b/p of 176/80 during visit, manually.  I’ve discussed with the patient the abnormality found during their pre-procedure evaluation.  They have been told to go to the ER for further evaluation but refuse.  I have discussed the risks including the possibility of worsening disease, pain, permanent disability, and/or death with them.  They voiced understanding of this to me.  I advised them that they can and should go immediately if they develop any worse/different/additional symptoms, or if they change their mind and want to continue their evaluation as we discussed.     PATIENT CURRENTLY DENIES CHEST PAIN  SHORTNESS OF BREATH  PALPITATIONS,  DYSURIA, OR UPPER RESPIRATORY INFECTION IN PAST 2 WEEKS  EXERCISE  TOLERANCE  1/2 FLIGHT OF STAIRS  WITHOUT SHORTNESS OF BREATH  pt denies any covid s/s, or tested positive in the past  pt advised self quarantine till day of procedure  Patient verbalized understanding of instructions and was given the opportunity to ask questions and have them answered.  As per patient, this is their complete medical and surgical history, including medications both prescribed or over the counter.  written and verbal instructions with teach back on chlorhexidine shampoo provided,  pt verbalized understanding with returned demonstration    Anesthesia Alert  NO--Difficult Airway  Yes--History of neck surgery or radiation, radiation to throat in 2011  NO--Limited ROM of neck  NO--History of Malignant hyperthermia  NO--Personal or family history of Pseudocholinesterase deficiency.  Yes--Prior Anesthesia Complication, PONV  NO--Latex Allergy  NO--Loose teeth, upper and lower dentures   Yes--History of Rheumatoid Arthritis  NO--LUCY  Yes--Bleeding risk, on xarelto   NO--Other_____    M79.89 [id number]    ^M79.89 [id number]    H/o or current diagnosis of HF- ACEI/ARB contraindication unknown    Family history of cervical cancer    FH: thyroid cancer    Lupus    AICD (automatic cardioverter/defibrillator) present    Throat cancer    RA (rheumatoid arthritis)    HTN (hypertension)    CHF (congestive heart failure)    COPD (chronic obstructive pulmonary disease)    DVT (deep venous thrombosis)    Pulmonary embolus    Pulmonary embolism    History of laryngeal cancer    S/P appendectomy    S/P cholecystectomy    History of laryngeal cancer    AICD (automatic cardioverter/defibrillator) present    S/P hysterectomy    History of back surgery    H/O foot surgery    S/P eye surgery

## 2023-09-06 ENCOUNTER — APPOINTMENT (OUTPATIENT)
Dept: PAIN MANAGEMENT | Facility: CLINIC | Age: 53
End: 2023-09-06
Payer: MEDICARE

## 2023-09-06 VITALS
DIASTOLIC BLOOD PRESSURE: 91 MMHG | SYSTOLIC BLOOD PRESSURE: 138 MMHG | HEIGHT: 63 IN | WEIGHT: 163 LBS | HEART RATE: 82 BPM | BODY MASS INDEX: 28.88 KG/M2

## 2023-09-06 PROCEDURE — 99214 OFFICE O/P EST MOD 30 MIN: CPT

## 2023-09-06 PROCEDURE — 80305 DRUG TEST PRSMV DIR OPT OBS: CPT | Mod: QW

## 2023-09-06 NOTE — PHYSICAL EXAM
[Normal Coordination] : normal coordination [Normal DTR UE/LE] : normal DTR UE/LE  [Normal Sensation] : normal sensation [Normal Mood and Affect] : normal mood and affect [Orientated] : orientated [Able to Communicate] : able to communicate [Well Developed] : well developed [Well Nourished] : well nourished [Flexion] : flexion [Extension] : extension [] : antalgic

## 2023-09-07 ENCOUNTER — LABORATORY RESULT (OUTPATIENT)
Age: 53
End: 2023-09-07

## 2023-09-07 LAB
AMP / AMPHETAMINE: NEGATIVE
BAR / SECOBARBITAL: NEGATIVE
BUP / BUPRENORPHINE: NEGATIVE
BZO / OXAZEPAM: POSITIVE
COC / COCAINE: NEGATIVE
CREATININE: 50 MG/DL
MDMA / METHYLENEDIOXYMETHAMPHETAMINE: NEGATIVE
MET / METHAMPHETAMINES: NEGATIVE
MOP / MORPHINE: POSITIVE
MTD / METHADONE: NEGATIVE
OXY / OXYCODONE: NEGATIVE
PCP / PHENCYCLIDINE: NEGATIVE
PH: 5
SPECIFIC GRAVITY: 1.02
TEMPERATURE: 90 F
THC / MARIJUANA: NEGATIVE

## 2023-09-07 NOTE — ASSESSMENT
[FreeTextEntry1] : This is a 53-year-old female with chronic pain. She has a history of lupus and APS. Patient returns to the office after prolonged ICU admission secondary to coumadin overdose. She continues with multi-site pains. She will utilize MS Contin 60 mg 2 tabs TID Morphine Sulfate IR 15 mg BID. The patient is stable on current pain medication with analgesia and without notable side effects or any obvious aberrant behaviors exhibited. Will renew medication today. She will f/u in 4 weeks for re evaluation. A UDS was repeated at todays visit. All this patients questions were answered and the conversation was understood well.  I explained the risk of addiction, tolerance and withdrawals. UDS will be done randomly and a drug agreement was signed.  I advised the patient they must keep their medication under a lock and key, or in a safe place away from children or other individuals. This medication given is solely for the patient and under no circumstances to be shared. Patient verbalized this and is in agreement with the aforementioned. I explained the risk of addiction, tolerance, and withdrawals. UDS will be done randomly and a drug agreement was signed.  I, Almita Celestin, attest that this documentation has been prepared under the direction and in the presence of Provider Danii Burnette MD.   Thank you for allowing me to assist in the management of this patient.    Best Regards,    Danii Burnette M.D., FAAPMR   Diplomate, American Board of Physical Medicine and Rehabilitation Diplomate, American Board of Pain Medicine  Diplomate, American Board of Pain Management

## 2023-09-07 NOTE — HISTORY OF PRESENT ILLNESS
[FreeTextEntry1] : ORIGINAL PRESENTATION: Ms. Bauman is a 53-year-old woman who has been a patient in our office since 1996. At that time, she presented with complaints of lower back pain during pregnancy. Ultimately, the patient underwent a right L4-5 laminectomy over 20 years ago by Dr. Andre Carter. However, she still struggles with chronic pain. The patient has been medically managed at that and has trialed various preparations including Lortab, Vicodin, Dilaudid, Morphine, OxyContin, and methadone. She also has multiple complaints of pain secondary to a pacemaker having been placed. She also claims to have a history of throat cancer; however, we do not have any evidence of any cancer diagnosis. She also has a history of connective tissue disorders such as lupus and rheumatoid arthritis.  PATIENT PRESENTS FOR FOLLOW UP: She is under our care for chronic pain secondary to lupus and lumbar issues which she is receiving continuing active treatment for. She was also diagnosed with antiphospholipid syndrome which unfortunately has led to frequent hospitalizations as of late. She was previously hospitalized due to coumadin overdose. She was admitted to the ICU in July and administered platelets and Vitamin K. Her pains were managed at the hospital with morphine. Overall, her pain has been elevated secondary to the prolonged hospital stay. She is medically managed with MS Contin 60 mg 2 tabs TID in conjunction with morphine sulfate 15 mg BID. Last visit she was only taking 5 tablets of the MS Contin which was a possible mistake through the pharmacy. With this medication regimen she is able to function and preform her ADLs more comfortably. She wishes to continue as is without change.   Of note, prior UDS was inconsistent for her medications. We will repeat it today.

## 2023-09-07 NOTE — DATA REVIEWED
[FreeTextEntry1] : SOAPP:   UDS: 08/07/23, ROSANA. reapted today 9/06/23.  [place] REGISTRY: Checked.

## 2023-09-18 LAB
PM 6 MAM: NEGATIVE NG/ML
PM 7-AMINO-CLONAZ: NEGATIVE NG/ML
PM ALPHA-HYDROXY-ALPRAZOLAM: NEGATIVE NG/ML
PM ALPHA-HYDROXY-MIDAZOLAM: NEGATIVE NG/ML
PM ALPRAZOLAM: >1000 NG/ML
PM AMOBARBITAL: NEGATIVE NG/ML
PM AMPHETAMINE INTERP: NEGATIVE
PM AMPHETAMINE: NEGATIVE NG/ML
PM BARBURATES INTERP: NEGATIVE
PM BEG: NEGATIVE NG/ML
PM BENZODIAZEPINES INTERP: POSITIVE
PM BUPRENORPHINE INTERP: NEGATIVE
PM BUPRENORPHINE: NEGATIVE NG/ML
PM BUTALBITAL: NEGATIVE NG/ML
PM CLONAZEPAM: NEGATIVE NG/ML
PM COCAINE INTERP: NEGATIVE
PM COCAINE: NEGATIVE NG/ML
PM CODIENE: NEGATIVE NG/ML
PM COTININE: >1000 NG/ML
PM DIAZEPAM: NEGATIVE NG/ML
PM DIHYROCODEINE: NEGATIVE NG/ML
PM EDDP: NEGATIVE NG/ML
PM FENTANYL INTERP: NEGATIVE
PM FENTANYL: NEGATIVE NG/ML
PM FLUNITRAZEPAM: NEGATIVE NG/ML
PM FLURAZEPAM: NEGATIVE NG/ML
PM HYDROCODONE: NEGATIVE NG/ML
PM HYDROMORPHONE: NEGATIVE NG/ML
PM LORAZEPAM: NEGATIVE NG/ML
PM MARIJUANA (DELTA-9-THC): NEGATIVE NG/ML
PM MARIJUANA INTERP: NEGATIVE
PM MDA: NEGATIVE NG/ML
PM MDEA: NEGATIVE NG/ML
PM MDMA: NEGATIVE NG/ML
PM MEPERIDINE: NEGATIVE NG/ML
PM METHADONE INTERP: NEGATIVE
PM METHADONE: NEGATIVE NG/ML
PM METHAMPHETAMINE: NEGATIVE NG/ML
PM MIDAZOLAM: NEGATIVE NG/ML
PM MORPHINE: >1000 NG/ML
PM NALOXONE: NEGATIVE NG/ML
PM NALTREXONE: NEGATIVE NG/ML
PM NICOTINE INTERP: POSITIVE
PM NORBUPRENORPHINE: NEGATIVE NG/ML
PM NORDIAZEPAM: NEGATIVE NG/ML
PM NORMEPERIDINE: NEGATIVE NG/ML
PM NOROXYCODONE: NEGATIVE NG/ML
PM OPIOID INTERP: POSITIVE
PM OXAZEPAM: NEGATIVE NG/ML
PM OXXYCODONE INTERP: NEGATIVE
PM OXYCODONE: NEGATIVE NG/ML
PM OXYMORPHONE: NEGATIVE NG/ML
PM PCP: NEGATIVE NG/ML
PM PHENCYCLIDINE INTERP: NEGATIVE
PM PHENOBARBITAL: NEGATIVE NG/ML
PM PPX: NEGATIVE NG/ML
PM PROPOXYPHENE INTERP: NEGATIVE
PM SECOBARBITAL: NEGATIVE NG/ML
PM SUFENTANIL: NEGATIVE NG/ML
PM TAPENTADOL: NEGATIVE NG/ML
PM TEMAZEPAM: NEGATIVE NG/ML
PM TRAMADOL INTERP: NEGATIVE
PM TRAMADOL: NEGATIVE NG/ML

## 2023-10-06 ENCOUNTER — APPOINTMENT (OUTPATIENT)
Dept: PAIN MANAGEMENT | Facility: CLINIC | Age: 53
End: 2023-10-06

## 2023-11-02 ENCOUNTER — OUTPATIENT (OUTPATIENT)
Dept: OUTPATIENT SERVICES | Facility: HOSPITAL | Age: 53
LOS: 1 days | End: 2023-11-02
Payer: MEDICARE

## 2023-11-02 DIAGNOSIS — Z90.49 ACQUIRED ABSENCE OF OTHER SPECIFIED PARTS OF DIGESTIVE TRACT: Chronic | ICD-10-CM

## 2023-11-02 DIAGNOSIS — Z90.710 ACQUIRED ABSENCE OF BOTH CERVIX AND UTERUS: Chronic | ICD-10-CM

## 2023-11-02 DIAGNOSIS — Z98.890 OTHER SPECIFIED POSTPROCEDURAL STATES: Chronic | ICD-10-CM

## 2023-11-02 DIAGNOSIS — K31.84 GASTROPARESIS: ICD-10-CM

## 2023-11-02 DIAGNOSIS — I77.1 STRICTURE OF ARTERY: Chronic | ICD-10-CM

## 2023-11-02 DIAGNOSIS — Z00.8 ENCOUNTER FOR OTHER GENERAL EXAMINATION: ICD-10-CM

## 2023-11-02 DIAGNOSIS — Z95.810 PRESENCE OF AUTOMATIC (IMPLANTABLE) CARDIAC DEFIBRILLATOR: Chronic | ICD-10-CM

## 2023-11-02 PROCEDURE — 78264 GASTRIC EMPTYING IMG STUDY: CPT | Mod: 26,MH

## 2023-11-02 PROCEDURE — 78264 GASTRIC EMPTYING IMG STUDY: CPT

## 2023-11-02 PROCEDURE — A9541: CPT

## 2023-11-03 DIAGNOSIS — K31.84 GASTROPARESIS: ICD-10-CM

## 2023-11-13 ENCOUNTER — APPOINTMENT (OUTPATIENT)
Dept: PAIN MANAGEMENT | Facility: CLINIC | Age: 53
End: 2023-11-13
Payer: MEDICARE

## 2023-11-13 PROCEDURE — 99212 OFFICE O/P EST SF 10 MIN: CPT | Mod: 95

## 2023-11-17 NOTE — CONSULT NOTE ADULT - ATTENDING COMMENTS
Stable, continue current medications and management.  The risks and benefits of my recommendations, as well as other treatment options were discussed with the patient today.    The side effects of the medications were discussed.  Advised not to take the medication with alcohol .  Exercise regularly and this can give you a sense of well being and help decrease feelings of anxiety.;  Get plenty of sleep. Sleep rests your brain as well as your body, and can improve your general sense of wellbeing as well as your mood.;  Avoid alcohol and drug abuse. It may seem that alcohol or drugs relax you. But in the long run they make anxiety worse and cause more problems.;  Avoid caffeine. Caffeine is found in coffee, tea, soft drinks and chocolate. Caffeine may increase your sense of anxiety because it stimulates your nervous system. Also avoid over-the-counter diet pills, and cough and cold medicines that contain a decongestant.;  Confront the things that have made you anxious in the past. Begin by just picturing yourself confronting these things. By doing this, you can get used to the idea of confronting the things that make you anxious before you actually do it. After you feel more comfortable picturing yourself confronting these things, you can begin to actually face them.;  If you feel yourself getting anxious, practice a relaxation technique or focus on a simple task, such as counting backward from 100 to 0.;  Although feelings of anxiety are scary, they won't hurt you. Label the level of your fear from 0 to 10 and keep track as it goes up and down. Notice that it doesn't stay at a very high level for more than a few seconds. When the fear comes, accept it. Wait and give it time to pass without running away from it.;  Take medications as prescribed.   We discussed the urine test result with the patient. She was informed that a lot of  CBD oil products do contain contaminants including THC unless this is pure CBD.  We did advise the patient not to use CBD oil that are contaminated with THC as this will be reflected in his urine tox screen.  She agrees and was informed that we would have another urine tox screen randomly in the future.   Patient examined , above note read and edited where appropriate .  extensive Left subclavian , IJV thrombosis . swelling much improved .   would discharge on Lovenox as outpatient . follow up with Dr Sultana . 134

## 2023-12-01 ENCOUNTER — RESULT REVIEW (OUTPATIENT)
Age: 53
End: 2023-12-01

## 2023-12-01 ENCOUNTER — OUTPATIENT (OUTPATIENT)
Dept: OUTPATIENT SERVICES | Facility: HOSPITAL | Age: 53
LOS: 1 days | End: 2023-12-01
Payer: MEDICARE

## 2023-12-01 DIAGNOSIS — Z90.49 ACQUIRED ABSENCE OF OTHER SPECIFIED PARTS OF DIGESTIVE TRACT: Chronic | ICD-10-CM

## 2023-12-01 DIAGNOSIS — Z95.810 PRESENCE OF AUTOMATIC (IMPLANTABLE) CARDIAC DEFIBRILLATOR: Chronic | ICD-10-CM

## 2023-12-01 DIAGNOSIS — Z98.890 OTHER SPECIFIED POSTPROCEDURAL STATES: Chronic | ICD-10-CM

## 2023-12-01 DIAGNOSIS — I77.1 STRICTURE OF ARTERY: Chronic | ICD-10-CM

## 2023-12-01 DIAGNOSIS — Z90.710 ACQUIRED ABSENCE OF BOTH CERVIX AND UTERUS: Chronic | ICD-10-CM

## 2023-12-01 DIAGNOSIS — Z13.820 ENCOUNTER FOR SCREENING FOR OSTEOPOROSIS: ICD-10-CM

## 2023-12-01 DIAGNOSIS — Z12.31 ENCOUNTER FOR SCREENING MAMMOGRAM FOR MALIGNANT NEOPLASM OF BREAST: ICD-10-CM

## 2023-12-01 DIAGNOSIS — Z00.8 ENCOUNTER FOR OTHER GENERAL EXAMINATION: ICD-10-CM

## 2023-12-01 PROCEDURE — 77063 BREAST TOMOSYNTHESIS BI: CPT

## 2023-12-01 PROCEDURE — 77063 BREAST TOMOSYNTHESIS BI: CPT | Mod: 26

## 2023-12-01 PROCEDURE — 77067 SCR MAMMO BI INCL CAD: CPT | Mod: 26

## 2023-12-01 PROCEDURE — 77067 SCR MAMMO BI INCL CAD: CPT

## 2023-12-01 PROCEDURE — 77080 DXA BONE DENSITY AXIAL: CPT

## 2023-12-02 DIAGNOSIS — Z13.820 ENCOUNTER FOR SCREENING FOR OSTEOPOROSIS: ICD-10-CM

## 2023-12-02 DIAGNOSIS — Z12.31 ENCOUNTER FOR SCREENING MAMMOGRAM FOR MALIGNANT NEOPLASM OF BREAST: ICD-10-CM

## 2023-12-04 DIAGNOSIS — R92.8 OTHER ABNORMAL AND INCONCLUSIVE FINDINGS ON DIAGNOSTIC IMAGING OF BREAST: ICD-10-CM

## 2023-12-14 NOTE — ED PROVIDER NOTE - CCCP TRG CHIEF CMPLNT
Patient Call    Who are you speaking with? Spouse    If it is not the patient, are they listed on an active communication consent form? Yes   What is the reason for this call? Wife is asking if there was going to be an antibiotic ordered after his surgery. Does this require a call back? Yes   If a call back is required, please list best call back number [phone]   If a call back is required, advise that a message will be forwarded to their care team and someone will return their call as soon as possible. Did you relay this information to the patient?  Yes syncope

## 2023-12-18 ENCOUNTER — APPOINTMENT (OUTPATIENT)
Dept: PAIN MANAGEMENT | Facility: CLINIC | Age: 53
End: 2023-12-18
Payer: MEDICARE

## 2023-12-18 VITALS
SYSTOLIC BLOOD PRESSURE: 125 MMHG | HEART RATE: 92 BPM | WEIGHT: 163 LBS | DIASTOLIC BLOOD PRESSURE: 97 MMHG | BODY MASS INDEX: 28.88 KG/M2 | HEIGHT: 63 IN

## 2023-12-18 DIAGNOSIS — M32.9 SYSTEMIC LUPUS ERYTHEMATOSUS, UNSPECIFIED: ICD-10-CM

## 2023-12-18 PROCEDURE — 99214 OFFICE O/P EST MOD 30 MIN: CPT

## 2023-12-18 RX ORDER — METOCLOPRAMIDE HYDROCHLORIDE 5 MG/1
5 TABLET ORAL
Refills: 0 | Status: ACTIVE | COMMUNITY

## 2023-12-18 NOTE — ASSESSMENT
[FreeTextEntry1] : This is a 53-year-old female with chronic pain. She has a history of lupus and APS. She continues with multi-site pains. She is utilizing MS Contin 60 mg 2 tabs TID Morphine Sulfate IR 15 mg BID. Will renew medication today. She will f/u in 4 weeks for reevaluation.   Overall there is at least a 30-50% reduction in pain with the prescribed analgesics. The patient denies any adverse side effects due to the medication (sleeping disturbance, constipation, sleepiness, hallucinations and/or urination problems).  I have consulted the  registry for the purpose of reviewing the patient's controlled substance.  ROSIE Childress MD

## 2023-12-18 NOTE — HISTORY OF PRESENT ILLNESS
[FreeTextEntry1] : ORIGINAL PRESENTATION: Ms. Bauman is a 53-year-old woman who has been a patient in our office since 1996. At that time, she presented with complaints of lower back pain during pregnancy. Ultimately, the patient underwent a right L4-5 laminectomy over 20 years ago by Dr. Andre Carter. However, she still struggles with chronic pain. The patient has been medically managed at that and has trialed various preparations including Lortab, Vicodin, Dilaudid, Morphine, OxyContin, and methadone. She also has multiple complaints of pain secondary to a pacemaker having been placed. She also claims to have a history of throat cancer; however, we do not have any evidence of any cancer diagnosis. She also has a history of connective tissue disorders such as lupus and rheumatoid arthritis.  PATIENT PRESENTS FOR FOLLOW UP: She is under our care for chronic pain secondary to lupus and lumbar issues which she is receiving continuing active treatment for. She has antiphospholipid syndrome which unfortunately has led to frequent hospitalizations as of late. She was admitted for gastroparesis most recently and was started on Reglan. She is medically managed with MS Contin 60 mg 2 tabs TID in conjunction with morphine sulfate 15 mg BID. With this medication regimen she is able to function and preform her ADLs more comfortably. She wishes to continue as is without change.   UDS- 9/07/23- consistent

## 2023-12-20 ENCOUNTER — APPOINTMENT (OUTPATIENT)
Dept: OTOLARYNGOLOGY | Facility: CLINIC | Age: 53
End: 2023-12-20

## 2023-12-21 ENCOUNTER — RESULT REVIEW (OUTPATIENT)
Age: 53
End: 2023-12-21

## 2023-12-21 ENCOUNTER — OUTPATIENT (OUTPATIENT)
Dept: OUTPATIENT SERVICES | Facility: HOSPITAL | Age: 53
LOS: 1 days | End: 2023-12-21
Payer: MEDICARE

## 2023-12-21 ENCOUNTER — APPOINTMENT (OUTPATIENT)
Dept: HEMATOLOGY ONCOLOGY | Facility: CLINIC | Age: 53
End: 2023-12-21
Payer: MEDICARE

## 2023-12-21 ENCOUNTER — LABORATORY RESULT (OUTPATIENT)
Age: 53
End: 2023-12-21

## 2023-12-21 VITALS
DIASTOLIC BLOOD PRESSURE: 78 MMHG | WEIGHT: 170 LBS | HEART RATE: 85 BPM | RESPIRATION RATE: 16 BRPM | BODY MASS INDEX: 30.12 KG/M2 | SYSTOLIC BLOOD PRESSURE: 165 MMHG | TEMPERATURE: 97.1 F | HEIGHT: 63 IN

## 2023-12-21 DIAGNOSIS — R92.8 OTHER ABNORMAL AND INCONCLUSIVE FINDINGS ON DIAGNOSTIC IMAGING OF BREAST: ICD-10-CM

## 2023-12-21 DIAGNOSIS — Z98.890 OTHER SPECIFIED POSTPROCEDURAL STATES: Chronic | ICD-10-CM

## 2023-12-21 DIAGNOSIS — I77.1 STRICTURE OF ARTERY: Chronic | ICD-10-CM

## 2023-12-21 DIAGNOSIS — Z90.710 ACQUIRED ABSENCE OF BOTH CERVIX AND UTERUS: Chronic | ICD-10-CM

## 2023-12-21 DIAGNOSIS — Z90.49 ACQUIRED ABSENCE OF OTHER SPECIFIED PARTS OF DIGESTIVE TRACT: Chronic | ICD-10-CM

## 2023-12-21 DIAGNOSIS — D68.61 ANTIPHOSPHOLIPID SYNDROME: ICD-10-CM

## 2023-12-21 DIAGNOSIS — R51.9 HEADACHE, UNSPECIFIED: ICD-10-CM

## 2023-12-21 DIAGNOSIS — Z95.810 PRESENCE OF AUTOMATIC (IMPLANTABLE) CARDIAC DEFIBRILLATOR: Chronic | ICD-10-CM

## 2023-12-21 PROCEDURE — 85652 RBC SED RATE AUTOMATED: CPT

## 2023-12-21 PROCEDURE — 76642 ULTRASOUND BREAST LIMITED: CPT | Mod: 26,RT

## 2023-12-21 PROCEDURE — 80053 COMPREHEN METABOLIC PANEL: CPT

## 2023-12-21 PROCEDURE — 86140 C-REACTIVE PROTEIN: CPT

## 2023-12-21 PROCEDURE — 85610 PROTHROMBIN TIME: CPT

## 2023-12-21 PROCEDURE — 85027 COMPLETE CBC AUTOMATED: CPT

## 2023-12-21 PROCEDURE — 76642 ULTRASOUND BREAST LIMITED: CPT | Mod: RT

## 2023-12-21 PROCEDURE — G0279: CPT

## 2023-12-21 PROCEDURE — 99214 OFFICE O/P EST MOD 30 MIN: CPT

## 2023-12-21 PROCEDURE — 83615 LACTATE (LD) (LDH) ENZYME: CPT

## 2023-12-21 PROCEDURE — 85730 THROMBOPLASTIN TIME PARTIAL: CPT

## 2023-12-21 PROCEDURE — G0279: CPT | Mod: 26

## 2023-12-21 PROCEDURE — 77065 DX MAMMO INCL CAD UNI: CPT | Mod: 26,RT

## 2023-12-21 PROCEDURE — 77065 DX MAMMO INCL CAD UNI: CPT | Mod: RT

## 2023-12-22 DIAGNOSIS — D68.61 ANTIPHOSPHOLIPID SYNDROME: ICD-10-CM

## 2023-12-22 DIAGNOSIS — R92.8 OTHER ABNORMAL AND INCONCLUSIVE FINDINGS ON DIAGNOSTIC IMAGING OF BREAST: ICD-10-CM

## 2023-12-22 LAB
ALBUMIN SERPL ELPH-MCNC: 4.5 G/DL
ALP BLD-CCNC: 81 U/L
ALT SERPL-CCNC: 7 U/L
ANION GAP SERPL CALC-SCNC: 10 MMOL/L
APTT BLD: 69.6 SEC
AST SERPL-CCNC: 12 U/L
BILIRUB SERPL-MCNC: 0.3 MG/DL
BUN SERPL-MCNC: 15 MG/DL
CALCIUM SERPL-MCNC: 9.7 MG/DL
CHLORIDE SERPL-SCNC: 101 MMOL/L
CO2 SERPL-SCNC: 28 MMOL/L
CREAT SERPL-MCNC: 0.9 MG/DL
CRP SERPL-MCNC: 5.9 MG/L
EGFR: 76 ML/MIN/1.73M2
ERYTHROCYTE [SEDIMENTATION RATE] IN BLOOD BY WESTERGREN METHOD: 23 MM/HR
GLUCOSE SERPL-MCNC: 83 MG/DL
HCT VFR BLD CALC: 40.3 %
HGB BLD-MCNC: 13.6 G/DL
INR PPP: 1 RATIO
LDH SERPL-CCNC: 209
MCHC RBC-ENTMCNC: 29.1 PG
MCHC RBC-ENTMCNC: 33.7 G/DL
MCV RBC AUTO: 86.3 FL
PLATELET # BLD AUTO: 198 K/UL
PMV BLD: 10.8 FL
POTASSIUM SERPL-SCNC: 4.3 MMOL/L
PROT SERPL-MCNC: 7.3 G/DL
PT BLD: 11.4 SEC
RBC # BLD: 4.67 M/UL
RBC # FLD: 13.7 %
SODIUM SERPL-SCNC: 139 MMOL/L
WBC # FLD AUTO: 6.62 K/UL

## 2023-12-22 RX ORDER — PREDNISONE 50 MG/1
50 TABLET ORAL
Qty: 10 | Refills: 0 | Status: COMPLETED | COMMUNITY
Start: 2023-12-22 | End: 2024-01-01

## 2023-12-22 NOTE — HISTORY OF PRESENT ILLNESS
[Disease:__________________________] : Disease: [unfilled] [de-identified] : The patient is coming for her regularly scheduled follow up. She was admitted to [facility] since her last visit here due to hematoma in the head.  She was switched to Therapeutic Lovenox 115 mg once daily from Coumadin due to poorly controlled INR level. She was also recently diagnosed with gastroparesis and was started on Reglan by her GI doctor.  She is up to date with her mammogram. Her last colonoscopy was about a couple of years ago and apparently there were no polyps.  Today, she is complaining of severe R temporal headache. Otherwise, she denies fever, chill, blurry vision, vomiting or nausea.

## 2023-12-22 NOTE — REVIEW OF SYSTEMS
[Fatigue] : fatigue [Vision Problems] : vision problems [SOB on Exertion] : shortness of breath during exertion [Joint Pain] : joint pain [Joint Stiffness] : joint stiffness [Muscle Pain] : muscle pain [Anxiety] : anxiety [Easy Bruising] : a tendency for easy bruising [Negative] : Integumentary [FreeTextEntry3] : Occasional pain and blurriness [de-identified] : Right sided headache

## 2023-12-22 NOTE — ASSESSMENT
[FreeTextEntry1] : 1. Antiphospholipid syndrome, was previously on Coumadin and now on Lovenox 115 mg once daily. The patient has a lupus anticoagulant. S/P thrombotic complications.  2. Temporal headache r/o temporal arteritis   2. SLE. This is somewhat confusing since the patient was told by one rheumatologist that she had SLE and another one said that she didn't. The clinical constellation of symptoms and laboratory findings favor the diagnosis of lupus.  4. History of H & N cancer. Followed by ENT.  PLAN:  We will obtain blood work including a repeat INR/PTT, CMP, CBC.  Will also check ESR and CRP to r/o temporal arteritis. If ESR/CRP are significantly elevated, we will schedule pt for temporal artery biopsy.   She was recommended to follow up with her rheumatologist for her arthralgias/arthritis and SLE.  For the time being, she should continue on Lovenox.  All questions answered.  The patient will call for the results of the tests.  Further recommendations after the above results are available.  Follow up in 6 months and as needed.

## 2023-12-22 NOTE — PHYSICAL EXAM
[Ambulatory and capable of all self care but unable to carry out any work activities] : Status 2- Ambulatory and capable of all self care but unable to carry out any work activities. Up and about more than 50% of waking hours [Normal] : grossly intact [de-identified] : Somewhat overweight [de-identified] : Scattered areas of hematomas on arms, legs and trunk [de-identified] : Mild arthritic changes [de-identified] : Anxious.

## 2024-01-05 ENCOUNTER — OUTPATIENT (OUTPATIENT)
Dept: OUTPATIENT SERVICES | Facility: HOSPITAL | Age: 54
LOS: 1 days | End: 2024-01-05
Payer: MEDICARE

## 2024-01-05 VITALS
OXYGEN SATURATION: 98 % | HEIGHT: 63 IN | DIASTOLIC BLOOD PRESSURE: 80 MMHG | TEMPERATURE: 98 F | SYSTOLIC BLOOD PRESSURE: 130 MMHG | RESPIRATION RATE: 16 BRPM | HEART RATE: 76 BPM | WEIGHT: 173.94 LBS

## 2024-01-05 DIAGNOSIS — M31.6 OTHER GIANT CELL ARTERITIS: ICD-10-CM

## 2024-01-05 DIAGNOSIS — Z95.810 PRESENCE OF AUTOMATIC (IMPLANTABLE) CARDIAC DEFIBRILLATOR: Chronic | ICD-10-CM

## 2024-01-05 DIAGNOSIS — Z90.49 ACQUIRED ABSENCE OF OTHER SPECIFIED PARTS OF DIGESTIVE TRACT: Chronic | ICD-10-CM

## 2024-01-05 DIAGNOSIS — Z98.890 OTHER SPECIFIED POSTPROCEDURAL STATES: Chronic | ICD-10-CM

## 2024-01-05 DIAGNOSIS — Z90.710 ACQUIRED ABSENCE OF BOTH CERVIX AND UTERUS: Chronic | ICD-10-CM

## 2024-01-05 DIAGNOSIS — I77.1 STRICTURE OF ARTERY: Chronic | ICD-10-CM

## 2024-01-05 DIAGNOSIS — Z01.818 ENCOUNTER FOR OTHER PREPROCEDURAL EXAMINATION: ICD-10-CM

## 2024-01-05 LAB
ALBUMIN SERPL ELPH-MCNC: 4.4 G/DL — SIGNIFICANT CHANGE UP (ref 3.5–5.2)
ALBUMIN SERPL ELPH-MCNC: 4.4 G/DL — SIGNIFICANT CHANGE UP (ref 3.5–5.2)
ALP SERPL-CCNC: 110 U/L — SIGNIFICANT CHANGE UP (ref 30–115)
ALP SERPL-CCNC: 110 U/L — SIGNIFICANT CHANGE UP (ref 30–115)
ALT FLD-CCNC: 20 U/L — SIGNIFICANT CHANGE UP (ref 0–41)
ALT FLD-CCNC: 20 U/L — SIGNIFICANT CHANGE UP (ref 0–41)
ANION GAP SERPL CALC-SCNC: 13 MMOL/L — SIGNIFICANT CHANGE UP (ref 7–14)
ANION GAP SERPL CALC-SCNC: 13 MMOL/L — SIGNIFICANT CHANGE UP (ref 7–14)
APTT BLD: 56.2 SEC — HIGH (ref 27–39.2)
APTT BLD: 56.2 SEC — HIGH (ref 27–39.2)
AST SERPL-CCNC: 13 U/L — SIGNIFICANT CHANGE UP (ref 0–41)
AST SERPL-CCNC: 13 U/L — SIGNIFICANT CHANGE UP (ref 0–41)
BASOPHILS # BLD AUTO: 0.02 K/UL — SIGNIFICANT CHANGE UP (ref 0–0.2)
BASOPHILS # BLD AUTO: 0.02 K/UL — SIGNIFICANT CHANGE UP (ref 0–0.2)
BASOPHILS NFR BLD AUTO: 0.3 % — SIGNIFICANT CHANGE UP (ref 0–1)
BASOPHILS NFR BLD AUTO: 0.3 % — SIGNIFICANT CHANGE UP (ref 0–1)
BILIRUB SERPL-MCNC: 0.2 MG/DL — SIGNIFICANT CHANGE UP (ref 0.2–1.2)
BILIRUB SERPL-MCNC: 0.2 MG/DL — SIGNIFICANT CHANGE UP (ref 0.2–1.2)
BUN SERPL-MCNC: 13 MG/DL — SIGNIFICANT CHANGE UP (ref 10–20)
BUN SERPL-MCNC: 13 MG/DL — SIGNIFICANT CHANGE UP (ref 10–20)
CALCIUM SERPL-MCNC: 9.4 MG/DL — SIGNIFICANT CHANGE UP (ref 8.4–10.5)
CALCIUM SERPL-MCNC: 9.4 MG/DL — SIGNIFICANT CHANGE UP (ref 8.4–10.5)
CHLORIDE SERPL-SCNC: 99 MMOL/L — SIGNIFICANT CHANGE UP (ref 98–110)
CHLORIDE SERPL-SCNC: 99 MMOL/L — SIGNIFICANT CHANGE UP (ref 98–110)
CO2 SERPL-SCNC: 27 MMOL/L — SIGNIFICANT CHANGE UP (ref 17–32)
CO2 SERPL-SCNC: 27 MMOL/L — SIGNIFICANT CHANGE UP (ref 17–32)
CREAT SERPL-MCNC: 0.8 MG/DL — SIGNIFICANT CHANGE UP (ref 0.7–1.5)
CREAT SERPL-MCNC: 0.8 MG/DL — SIGNIFICANT CHANGE UP (ref 0.7–1.5)
EGFR: 88 ML/MIN/1.73M2 — SIGNIFICANT CHANGE UP
EGFR: 88 ML/MIN/1.73M2 — SIGNIFICANT CHANGE UP
EOSINOPHIL # BLD AUTO: 0.12 K/UL — SIGNIFICANT CHANGE UP (ref 0–0.7)
EOSINOPHIL # BLD AUTO: 0.12 K/UL — SIGNIFICANT CHANGE UP (ref 0–0.7)
EOSINOPHIL NFR BLD AUTO: 2 % — SIGNIFICANT CHANGE UP (ref 0–8)
EOSINOPHIL NFR BLD AUTO: 2 % — SIGNIFICANT CHANGE UP (ref 0–8)
GLUCOSE SERPL-MCNC: 84 MG/DL — SIGNIFICANT CHANGE UP (ref 70–99)
GLUCOSE SERPL-MCNC: 84 MG/DL — SIGNIFICANT CHANGE UP (ref 70–99)
HCT VFR BLD CALC: 43.8 % — SIGNIFICANT CHANGE UP (ref 37–47)
HCT VFR BLD CALC: 43.8 % — SIGNIFICANT CHANGE UP (ref 37–47)
HGB BLD-MCNC: 13.8 G/DL — SIGNIFICANT CHANGE UP (ref 12–16)
HGB BLD-MCNC: 13.8 G/DL — SIGNIFICANT CHANGE UP (ref 12–16)
IMM GRANULOCYTES NFR BLD AUTO: 0.8 % — HIGH (ref 0.1–0.3)
IMM GRANULOCYTES NFR BLD AUTO: 0.8 % — HIGH (ref 0.1–0.3)
INR BLD: 1 RATIO — SIGNIFICANT CHANGE UP (ref 0.65–1.3)
INR BLD: 1 RATIO — SIGNIFICANT CHANGE UP (ref 0.65–1.3)
LYMPHOCYTES # BLD AUTO: 2.88 K/UL — SIGNIFICANT CHANGE UP (ref 1.2–3.4)
LYMPHOCYTES # BLD AUTO: 2.88 K/UL — SIGNIFICANT CHANGE UP (ref 1.2–3.4)
LYMPHOCYTES # BLD AUTO: 46.8 % — SIGNIFICANT CHANGE UP (ref 20.5–51.1)
LYMPHOCYTES # BLD AUTO: 46.8 % — SIGNIFICANT CHANGE UP (ref 20.5–51.1)
MCHC RBC-ENTMCNC: 27.7 PG — SIGNIFICANT CHANGE UP (ref 27–31)
MCHC RBC-ENTMCNC: 27.7 PG — SIGNIFICANT CHANGE UP (ref 27–31)
MCHC RBC-ENTMCNC: 31.5 G/DL — LOW (ref 32–37)
MCHC RBC-ENTMCNC: 31.5 G/DL — LOW (ref 32–37)
MCV RBC AUTO: 88 FL — SIGNIFICANT CHANGE UP (ref 81–99)
MCV RBC AUTO: 88 FL — SIGNIFICANT CHANGE UP (ref 81–99)
MONOCYTES # BLD AUTO: 0.53 K/UL — SIGNIFICANT CHANGE UP (ref 0.1–0.6)
MONOCYTES # BLD AUTO: 0.53 K/UL — SIGNIFICANT CHANGE UP (ref 0.1–0.6)
MONOCYTES NFR BLD AUTO: 8.6 % — SIGNIFICANT CHANGE UP (ref 1.7–9.3)
MONOCYTES NFR BLD AUTO: 8.6 % — SIGNIFICANT CHANGE UP (ref 1.7–9.3)
NEUTROPHILS # BLD AUTO: 2.55 K/UL — SIGNIFICANT CHANGE UP (ref 1.4–6.5)
NEUTROPHILS # BLD AUTO: 2.55 K/UL — SIGNIFICANT CHANGE UP (ref 1.4–6.5)
NEUTROPHILS NFR BLD AUTO: 41.5 % — LOW (ref 42.2–75.2)
NEUTROPHILS NFR BLD AUTO: 41.5 % — LOW (ref 42.2–75.2)
NRBC # BLD: 0 /100 WBCS — SIGNIFICANT CHANGE UP (ref 0–0)
NRBC # BLD: 0 /100 WBCS — SIGNIFICANT CHANGE UP (ref 0–0)
PLATELET # BLD AUTO: 213 K/UL — SIGNIFICANT CHANGE UP (ref 130–400)
PLATELET # BLD AUTO: 213 K/UL — SIGNIFICANT CHANGE UP (ref 130–400)
PMV BLD: 11 FL — HIGH (ref 7.4–10.4)
PMV BLD: 11 FL — HIGH (ref 7.4–10.4)
POTASSIUM SERPL-MCNC: 4.4 MMOL/L — SIGNIFICANT CHANGE UP (ref 3.5–5)
POTASSIUM SERPL-MCNC: 4.4 MMOL/L — SIGNIFICANT CHANGE UP (ref 3.5–5)
POTASSIUM SERPL-SCNC: 4.4 MMOL/L — SIGNIFICANT CHANGE UP (ref 3.5–5)
POTASSIUM SERPL-SCNC: 4.4 MMOL/L — SIGNIFICANT CHANGE UP (ref 3.5–5)
PROT SERPL-MCNC: 7.5 G/DL — SIGNIFICANT CHANGE UP (ref 6–8)
PROT SERPL-MCNC: 7.5 G/DL — SIGNIFICANT CHANGE UP (ref 6–8)
PROTHROM AB SERPL-ACNC: 11.4 SEC — SIGNIFICANT CHANGE UP (ref 9.95–12.87)
PROTHROM AB SERPL-ACNC: 11.4 SEC — SIGNIFICANT CHANGE UP (ref 9.95–12.87)
RBC # BLD: 4.98 M/UL — SIGNIFICANT CHANGE UP (ref 4.2–5.4)
RBC # BLD: 4.98 M/UL — SIGNIFICANT CHANGE UP (ref 4.2–5.4)
RBC # FLD: 13.3 % — SIGNIFICANT CHANGE UP (ref 11.5–14.5)
RBC # FLD: 13.3 % — SIGNIFICANT CHANGE UP (ref 11.5–14.5)
SODIUM SERPL-SCNC: 139 MMOL/L — SIGNIFICANT CHANGE UP (ref 135–146)
SODIUM SERPL-SCNC: 139 MMOL/L — SIGNIFICANT CHANGE UP (ref 135–146)
WBC # BLD: 6.15 K/UL — SIGNIFICANT CHANGE UP (ref 4.8–10.8)
WBC # BLD: 6.15 K/UL — SIGNIFICANT CHANGE UP (ref 4.8–10.8)
WBC # FLD AUTO: 6.15 K/UL — SIGNIFICANT CHANGE UP (ref 4.8–10.8)
WBC # FLD AUTO: 6.15 K/UL — SIGNIFICANT CHANGE UP (ref 4.8–10.8)

## 2024-01-05 PROCEDURE — 85610 PROTHROMBIN TIME: CPT

## 2024-01-05 PROCEDURE — 99214 OFFICE O/P EST MOD 30 MIN: CPT | Mod: 25

## 2024-01-05 PROCEDURE — 80053 COMPREHEN METABOLIC PANEL: CPT

## 2024-01-05 PROCEDURE — 85025 COMPLETE CBC W/AUTO DIFF WBC: CPT

## 2024-01-05 PROCEDURE — 85730 THROMBOPLASTIN TIME PARTIAL: CPT

## 2024-01-05 PROCEDURE — 93010 ELECTROCARDIOGRAM REPORT: CPT

## 2024-01-05 PROCEDURE — 93005 ELECTROCARDIOGRAM TRACING: CPT

## 2024-01-05 PROCEDURE — 36415 COLL VENOUS BLD VENIPUNCTURE: CPT

## 2024-01-05 RX ORDER — ESCITALOPRAM OXALATE 10 MG/1
1 TABLET, FILM COATED ORAL
Refills: 0 | DISCHARGE

## 2024-01-05 RX ORDER — HYDRALAZINE HCL 50 MG
1 TABLET ORAL
Qty: 0 | Refills: 0 | DISCHARGE

## 2024-01-05 RX ORDER — MORPHINE SULFATE 50 MG/1
2 CAPSULE, EXTENDED RELEASE ORAL
Qty: 0 | Refills: 0 | DISCHARGE

## 2024-01-05 NOTE — H&P PST ADULT - IS PATIENT PREGNANT?
Patient was agreeable to advance male sling insertion with Dr. Alston as his anticholinergic medications were no longer working.  I did do a preop when I saw him so he should be good to go was Dr. Alston would like him to be seen in the office prior. no

## 2024-01-05 NOTE — H&P PST ADULT - HISTORY OF PRESENT ILLNESS
52 Y/O FEMALE PT TO PAST WITH HX              PT NOW FOR SCHEDULED PROCEDURE. PT DENIES ANY CP SOB PALP COUGH DYSURIA FEVER URI. PT ABLE TO SYLVIE 1-2 FOS W/O SOB  Anesthesia Alert  NO--Difficult Airway  NO--History of neck surgery or radiation  NO--Limited ROM of neck  NO--History of Malignant hyperthermia  NO--Personal or family history of Pseudocholinesterase deficiency.  NO--Prior Anesthesia Complication  NO--Latex Allergy  NO--Loose teeth  NO--History of Rheumatoid Arthritis  NO--LUCY  NO--Bleeding risk  NO--Other_____   54 Y/O FEMALE PT TO PAST WITH HX              PT NOW FOR SCHEDULED PROCEDURE. PT DENIES ANY CP SOB PALP COUGH DYSURIA FEVER URI. PT ABLE TO SYLVIE 1-2 FOS W/O SOB  Anesthesia Alert  NO--Difficult Airway  NO--History of neck surgery or radiation  NO--Limited ROM of neck  NO--History of Malignant hyperthermia  NO--Personal or family history of Pseudocholinesterase deficiency.  NO--Prior Anesthesia Complication  NO--Latex Allergy  NO--Loose teeth  NO--History of Rheumatoid Arthritis  NO--LUCY  NO--Bleeding risk  NO--Other_____   52 Y/O FEMALE PT TO PAST WITH HX TEMPORAL ARTERY PAIN   PT NOW FOR SCHEDULED PROCEDURE ( . PT DENIES ANY CP SOB PALP COUGH DYSURIA FEVER URI. PT ABLE TO SYLVIE 1-2 FOS W/O SOB  Anesthesia Alert  NO--Difficult Airway  NO--History of neck surgery or radiation  NO--Limited ROM of neck  NO--History of Malignant hyperthermia  NO--Personal or family history of Pseudocholinesterase deficiency.  NO--Prior Anesthesia Complication  NO--Latex Allergy  NO--Loose teeth  NO--History of Rheumatoid Arthritis  NO--LUCY  NO--Bleeding risk  NO--Other_____   54 Y/O FEMALE PT TO PAST WITH HX TEMPORAL ARTERY PAIN FOR PAST MO   PT NOW FOR SCHEDULED PROCEDURE ( B/L TEMPORAL ARTERY BX)  . PT DENIES ANY CP SOB PALP COUGH DYSURIA FEVER URI.   Anesthesia Alert  NO--Difficult Airway  NO--History of neck surgery or radiation  NO--Limited ROM of neck  NO--History of Malignant hyperthermia  NO--Personal or family history of Pseudocholinesterase deficiency.  NO--Prior Anesthesia Complication  NO--Latex Allergy  NO--Loose teeth  NO--History of Rheumatoid Arthritis  NO--LUCY  NO--Bleeding risk  NO--Other_____      RESULT SUMMARY:  0 points  Class I Risk    3.9 %  30-day risk of death, MI, or cardiac arrest        INPUTS:  Elevated-risk surgery —> 0 = No  History of ischemic heart disease —> 0 = No  History of congestive heart failure —> 0 = No  History of cerebrovascular disease —> 0 = No  Pre-operative treatment with insulin —> 0 = No  Pre-operative creatinine >2 mg/dL / 176.8 µmol/L —> 0 = No    RESULT SUMMARY:  7.2 points  The higher the score (maximum 58.2), the higher the functional status.    3.63 METs        INPUTS:  Take care of self —> 2.75 = Yes  Walk indoors —> 1.75 = Yes  Walk 1&ndash;2 blocks on level ground —> 0 = No  Climb a flight of stairs or walk up a hill —> 0 = No  Run a short distance —> 0 = No  Do light work around the house —> 2.7 = Yes  Do moderate work around the house —> 0 = No  Do heavy work around the house —> 0 = No  Do yardwork —> 0 = No  Have sexual relations —> 0 = No  Participate in moderate recreational activities —> 0 = No  Participate in strenuous sports —> 0 = No   52 Y/O FEMALE PT TO PAST WITH HX TEMPORAL ARTERY PAIN FOR PAST MO   PT NOW FOR SCHEDULED PROCEDURE ( B/L TEMPORAL ARTERY BX)  . PT DENIES ANY CP SOB PALP COUGH DYSURIA FEVER URI.   Anesthesia Alert  NO--Difficult Airway  NO--History of neck surgery or radiation  NO--Limited ROM of neck  NO--History of Malignant hyperthermia  NO--Personal or family history of Pseudocholinesterase deficiency.  NO--Prior Anesthesia Complication  NO--Latex Allergy  NO--Loose teeth  NO--History of Rheumatoid Arthritis  NO--LUCY  NO--Bleeding risk  NO--Other_____      RESULT SUMMARY:  0 points  Class I Risk    3.9 %  30-day risk of death, MI, or cardiac arrest        INPUTS:  Elevated-risk surgery —> 0 = No  History of ischemic heart disease —> 0 = No  History of congestive heart failure —> 0 = No  History of cerebrovascular disease —> 0 = No  Pre-operative treatment with insulin —> 0 = No  Pre-operative creatinine >2 mg/dL / 176.8 µmol/L —> 0 = No    RESULT SUMMARY:  7.2 points  The higher the score (maximum 58.2), the higher the functional status.    3.63 METs        INPUTS:  Take care of self —> 2.75 = Yes  Walk indoors —> 1.75 = Yes  Walk 1&ndash;2 blocks on level ground —> 0 = No  Climb a flight of stairs or walk up a hill —> 0 = No  Run a short distance —> 0 = No  Do light work around the house —> 2.7 = Yes  Do moderate work around the house —> 0 = No  Do heavy work around the house —> 0 = No  Do yardwork —> 0 = No  Have sexual relations —> 0 = No  Participate in moderate recreational activities —> 0 = No  Participate in strenuous sports —> 0 = No

## 2024-01-06 DIAGNOSIS — Z01.818 ENCOUNTER FOR OTHER PREPROCEDURAL EXAMINATION: ICD-10-CM

## 2024-01-06 DIAGNOSIS — M31.6 OTHER GIANT CELL ARTERITIS: ICD-10-CM

## 2024-01-10 ENCOUNTER — TRANSCRIPTION ENCOUNTER (OUTPATIENT)
Age: 54
End: 2024-01-10

## 2024-01-10 ENCOUNTER — OUTPATIENT (OUTPATIENT)
Dept: OUTPATIENT SERVICES | Facility: HOSPITAL | Age: 54
LOS: 1 days | Discharge: HOME | End: 2024-01-10
Payer: MEDICARE

## 2024-01-10 ENCOUNTER — RESULT REVIEW (OUTPATIENT)
Age: 54
End: 2024-01-10

## 2024-01-10 VITALS
WEIGHT: 173.94 LBS | DIASTOLIC BLOOD PRESSURE: 63 MMHG | SYSTOLIC BLOOD PRESSURE: 112 MMHG | RESPIRATION RATE: 19 BRPM | TEMPERATURE: 98 F | HEIGHT: 63 IN | OXYGEN SATURATION: 93 % | HEART RATE: 85 BPM

## 2024-01-10 VITALS
RESPIRATION RATE: 20 BRPM | SYSTOLIC BLOOD PRESSURE: 155 MMHG | DIASTOLIC BLOOD PRESSURE: 75 MMHG | OXYGEN SATURATION: 99 % | HEART RATE: 71 BPM

## 2024-01-10 DIAGNOSIS — Z90.49 ACQUIRED ABSENCE OF OTHER SPECIFIED PARTS OF DIGESTIVE TRACT: Chronic | ICD-10-CM

## 2024-01-10 DIAGNOSIS — I77.1 STRICTURE OF ARTERY: Chronic | ICD-10-CM

## 2024-01-10 DIAGNOSIS — Z98.890 OTHER SPECIFIED POSTPROCEDURAL STATES: Chronic | ICD-10-CM

## 2024-01-10 DIAGNOSIS — Z90.710 ACQUIRED ABSENCE OF BOTH CERVIX AND UTERUS: Chronic | ICD-10-CM

## 2024-01-10 DIAGNOSIS — Z95.810 PRESENCE OF AUTOMATIC (IMPLANTABLE) CARDIAC DEFIBRILLATOR: Chronic | ICD-10-CM

## 2024-01-10 DIAGNOSIS — M31.6 OTHER GIANT CELL ARTERITIS: ICD-10-CM

## 2024-01-10 PROCEDURE — 88305 TISSUE EXAM BY PATHOLOGIST: CPT | Mod: 26

## 2024-01-10 PROCEDURE — 88305 TISSUE EXAM BY PATHOLOGIST: CPT

## 2024-01-10 PROCEDURE — 88313 SPECIAL STAINS GROUP 2: CPT

## 2024-01-10 PROCEDURE — 37609 LIGATION/BX TEMPORAL ARTERY: CPT | Mod: 50

## 2024-01-10 PROCEDURE — 88313 SPECIAL STAINS GROUP 2: CPT | Mod: 26

## 2024-01-10 RX ORDER — MEPERIDINE HYDROCHLORIDE 50 MG/ML
12.5 INJECTION INTRAMUSCULAR; INTRAVENOUS; SUBCUTANEOUS
Refills: 0 | Status: DISCONTINUED | OUTPATIENT
Start: 2024-01-10 | End: 2024-01-10

## 2024-01-10 RX ORDER — MORPHINE SULFATE 50 MG/1
1 CAPSULE, EXTENDED RELEASE ORAL
Refills: 0 | DISCHARGE

## 2024-01-10 RX ORDER — HYDROMORPHONE HYDROCHLORIDE 2 MG/ML
0.5 INJECTION INTRAMUSCULAR; INTRAVENOUS; SUBCUTANEOUS
Refills: 0 | Status: DISCONTINUED | OUTPATIENT
Start: 2024-01-10 | End: 2024-01-10

## 2024-01-10 RX ORDER — OXYCODONE AND ACETAMINOPHEN 5; 325 MG/1; MG/1
1 TABLET ORAL ONCE
Refills: 0 | Status: DISCONTINUED | OUTPATIENT
Start: 2024-01-10 | End: 2024-01-10

## 2024-01-10 RX ORDER — OXYCODONE AND ACETAMINOPHEN 5; 325 MG/1; MG/1
2 TABLET ORAL ONCE
Refills: 0 | Status: DISCONTINUED | OUTPATIENT
Start: 2024-01-10 | End: 2024-01-10

## 2024-01-10 RX ORDER — SODIUM CHLORIDE 9 MG/ML
1000 INJECTION, SOLUTION INTRAVENOUS
Refills: 0 | Status: DISCONTINUED | OUTPATIENT
Start: 2024-01-10 | End: 2024-01-10

## 2024-01-10 RX ORDER — HYDROMORPHONE HYDROCHLORIDE 2 MG/ML
1 INJECTION INTRAMUSCULAR; INTRAVENOUS; SUBCUTANEOUS
Refills: 0 | Status: DISCONTINUED | OUTPATIENT
Start: 2024-01-10 | End: 2024-01-10

## 2024-01-10 RX ORDER — ONDANSETRON 8 MG/1
4 TABLET, FILM COATED ORAL ONCE
Refills: 0 | Status: DISCONTINUED | OUTPATIENT
Start: 2024-01-10 | End: 2024-01-10

## 2024-01-10 NOTE — ASU PREOP CHECKLIST - 1.
Anesthesia MD Kiran and OR GEOVANY Chamorro aware of pt hx, that pt has dual AICD/pacemaker and that upper dentures cement glued in and cannot be removed.

## 2024-01-10 NOTE — ASU PATIENT PROFILE, ADULT - FALL HARM RISK - HARM RISK INTERVENTIONS
Communicate Risk of Fall with Harm to all staff/Reinforce activity limits and safety measures with patient and family/Tailored Fall Risk Interventions/Visual Cue: Yellow wristband and red socks/Bed in lowest position, wheels locked, appropriate side rails in place/Call bell, personal items and telephone in reach/Instruct patient to call for assistance before getting out of bed or chair/Non-slip footwear when patient is out of bed/[place] to call system/Physically safe environment - no spills, clutter or unnecessary equipment/Purposeful Proactive Rounding/Room/bathroom lighting operational, light cord in reach Communicate Risk of Fall with Harm to all staff/Reinforce activity limits and safety measures with patient and family/Tailored Fall Risk Interventions/Visual Cue: Yellow wristband and red socks/Bed in lowest position, wheels locked, appropriate side rails in place/Call bell, personal items and telephone in reach/Instruct patient to call for assistance before getting out of bed or chair/Non-slip footwear when patient is out of bed/[place] to call system/Physically safe environment - no spills, clutter or unnecessary equipment/Purposeful Proactive Rounding/Room/bathroom lighting operational, light cord in reach

## 2024-01-10 NOTE — ASU DISCHARGE PLAN (ADULT/PEDIATRIC) - FOLLOW UP APPOINTMENTS
[facility],  Endoscopy/Ambulatory Surgery [facility],  Endoscopy/Ambulatory Surgery [facility],  Endoscopy/Ambulatory Surgery [facility]

## 2024-01-10 NOTE — ASU PATIENT PROFILE, ADULT - NSICDXPASTSURGICALHX_GEN_ALL_CORE_FT
PAST SURGICAL HISTORY:  AICD (automatic cardioverter/defibrillator) present [employer]    H/O foot surgery     History of back surgery     S/P appendectomy     S/P cholecystectomy     S/P eye surgery     S/P hysterectomy     Subclavian arterial stenosis

## 2024-01-10 NOTE — ASU PATIENT PROFILE, ADULT - NS TRANSFER DENTURES
Upper dentures cement glued and cannot be removed as per pt. Lower dentures removed./Full/Upper/Lower

## 2024-01-10 NOTE — ASU DISCHARGE PLAN (ADULT/PEDIATRIC) - NS MD DC FALL RISK RISK
For information on Fall & Injury Prevention, visit: [email or web address] OR  [email or web address]/[email or web address]-[email or web address]-injury OR  https://www.cdc.gov/steadi/patient.html For information on Fall & Injury Prevention, visit: [email or web address]://www.[email or web address]/[email or web address]-[email or web address]-mobility OR  [email or web address]://[email or web address]/[email or web address]-[email or web address]-[email or web address]-[email or web address]-injury OR  https://www.cdc.gov/steadi/patient.html For information on Fall & Injury Prevention, visit: [email or web address]://www.[email or web address]/[email or web address]-[email or web address]-[email or web address]-mobility OR  https://www.[facility]/news/fall-prevention-tips-to-avoid-injury OR  https://www.cdc.gov/steadi/patient.html

## 2024-01-11 LAB
SURGICAL PATHOLOGY STUDY: SIGNIFICANT CHANGE UP
SURGICAL PATHOLOGY STUDY: SIGNIFICANT CHANGE UP

## 2024-01-15 DIAGNOSIS — Z95.0 PRESENCE OF CARDIAC PACEMAKER: ICD-10-CM

## 2024-01-15 DIAGNOSIS — Z88.0 ALLERGY STATUS TO PENICILLIN: ICD-10-CM

## 2024-01-15 DIAGNOSIS — I50.9 HEART FAILURE, UNSPECIFIED: ICD-10-CM

## 2024-01-15 DIAGNOSIS — Z86.718 PERSONAL HISTORY OF OTHER VENOUS THROMBOSIS AND EMBOLISM: ICD-10-CM

## 2024-01-15 DIAGNOSIS — Z85.41 PERSONAL HISTORY OF MALIGNANT NEOPLASM OF CERVIX UTERI: ICD-10-CM

## 2024-01-15 DIAGNOSIS — Z90.710 ACQUIRED ABSENCE OF BOTH CERVIX AND UTERUS: ICD-10-CM

## 2024-01-15 DIAGNOSIS — M31.6 OTHER GIANT CELL ARTERITIS: ICD-10-CM

## 2024-01-15 DIAGNOSIS — M19.09 PRIMARY OSTEOARTHRITIS, OTHER SPECIFIED SITE: ICD-10-CM

## 2024-01-15 DIAGNOSIS — K21.9 GASTRO-ESOPHAGEAL REFLUX DISEASE WITHOUT ESOPHAGITIS: ICD-10-CM

## 2024-01-15 DIAGNOSIS — I11.0 HYPERTENSIVE HEART DISEASE WITH HEART FAILURE: ICD-10-CM

## 2024-01-15 DIAGNOSIS — J44.9 CHRONIC OBSTRUCTIVE PULMONARY DISEASE, UNSPECIFIED: ICD-10-CM

## 2024-01-15 DIAGNOSIS — Z90.49 ACQUIRED ABSENCE OF OTHER SPECIFIED PARTS OF DIGESTIVE TRACT: ICD-10-CM

## 2024-01-16 PROBLEM — D68.61 ANTIPHOSPHOLIPID SYNDROME: Chronic | Status: ACTIVE | Noted: 2024-01-10

## 2024-01-17 ENCOUNTER — LABORATORY RESULT (OUTPATIENT)
Age: 54
End: 2024-01-17

## 2024-01-17 ENCOUNTER — APPOINTMENT (OUTPATIENT)
Dept: PAIN MANAGEMENT | Facility: CLINIC | Age: 54
End: 2024-01-17
Payer: MEDICARE

## 2024-01-17 VITALS
SYSTOLIC BLOOD PRESSURE: 156 MMHG | WEIGHT: 170 LBS | DIASTOLIC BLOOD PRESSURE: 85 MMHG | HEIGHT: 63 IN | BODY MASS INDEX: 30.12 KG/M2 | HEART RATE: 75 BPM

## 2024-01-17 LAB
AMP / AMPHETAMINE: NEGATIVE
BAR / SECOBARBITAL: NEGATIVE
BUP / BUPRENORPHINE: NEGATIVE
BZO / OXAZEPAM: POSITIVE
COC / COCAINE: NEGATIVE
CREATININE: NORMAL MG/DL
MDMA / METHYLENEDIOXYMETHAMPHETAMINE: NEGATIVE
MET / METHAMPHETAMINES: NEGATIVE
MOP / MORPHINE: POSITIVE
MTD / METHADONE: NEGATIVE
OXY / OXYCODONE: NEGATIVE
PCP / PHENCYCLIDINE: NEGATIVE
PH: NORMAL
SPECIFIC GRAVITY: NORMAL
TEMPERATURE: 90 F
THC / MARIJUANA: NEGATIVE

## 2024-01-17 PROCEDURE — 80305 DRUG TEST PRSMV DIR OPT OBS: CPT | Mod: QW

## 2024-01-17 PROCEDURE — 99214 OFFICE O/P EST MOD 30 MIN: CPT

## 2024-01-17 RX ORDER — PREDNISONE 50 MG/1
50 TABLET ORAL
Refills: 0 | Status: ACTIVE | COMMUNITY

## 2024-01-17 NOTE — ASSESSMENT
[FreeTextEntry1] : This is a 53-year-old female with chronic pain. She has a history of lupus and APS. She continues with multi-site pains. She is medically managed on MS Contin 60 mg 2 tabs TID Morphine Sulfate IR 15 mg BID. We will begin decreasing her medication today from MS Contin 60 mg 2 tabs TID to 5 tabs daily. She will follow up in 4 weeks for medication refill and reassessment. She is aware that we will need to continue to taper her medication going forward. A UDS was repeated today.  The patient was provided with an opioid disengagement form at today's visit. All this patients questions were answered and the conversation was understood well.  I, Almita Celestin, attest that this documentation has been prepared under the direction and in the presence of Provider Danii Burnette MD.   Thank you for allowing me to assist in the management of this patient.    Best Regards,    Danii Burnette M.D., FAAPMR   Diplomate, American Board of Physical Medicine and Rehabilitation Diplomate, American Board of Pain Medicine  Diplomate, American Board of Pain Management

## 2024-01-17 NOTE — PHYSICAL EXAM
[Normal Coordination] : normal coordination [Normal DTR UE/LE] : normal DTR UE/LE  [Normal Sensation] : normal sensation [Normal Mood and Affect] : normal mood and affect [Orientated] : orientated [Well Developed] : well developed [Well Nourished] : well nourished

## 2024-01-17 NOTE — HISTORY OF PRESENT ILLNESS
[FreeTextEntry1] : ORIGINAL PRESENTATION: Ms. Bauman is a 53-year-old woman who has been a patient in our office since 1996. At that time, she presented with complaints of lower back pain during pregnancy. Ultimately, the patient underwent a right L4-5 laminectomy over 20 years ago by Dr. Andre Carter. However, she still struggles with chronic pain. The patient has been medically managed at that and has trialed various preparations including Lortab, Vicodin, Dilaudid, Morphine, OxyContin, and methadone. She also has multiple complaints of pain secondary to a pacemaker having been placed. She also claims to have a history of throat cancer; however, we do not have any evidence of any cancer diagnosis. She also has a history of connective tissue disorders such as lupus and rheumatoid arthritis.  PATIENT PRESENTS FOR FOLLOW UP: She is under our care for chronic pain secondary to lupus and lumbar issues which she is receiving continuing active treatment for. She has antiphospholipid syndrome which unfortunately has led to frequent hospitalizations as of late. She was admitted for gastroparesis most recently and was started on Reglan. She is medically managed with MS Contin 60 mg 2 tabs TID in conjunction with morphine sulfate 15 mg BID. We had a discussion regarding her medication at this time and explained that we will need to wean her off her medication moving forward. This may take several months. We will start by decreasing her from MS Contin 60 mg 2 tablets from 6 a day to 5 a day.   Of note, she underwent surgery last week for temporal arteritis and she was advised to follow up with a rheumatologist.

## 2024-02-05 LAB
PM 6 MAM: NEGATIVE NG/ML
PM 7-AMINO-CLONAZ: NEGATIVE NG/ML
PM ALPHA-HYDROXY-ALPRAZOLAM: NEGATIVE NG/ML
PM ALPHA-HYDROXY-MIDAZOLAM: NEGATIVE NG/ML
PM ALPRAZOLAM: >1000 NG/ML
PM AMOBARBITAL: NEGATIVE NG/ML
PM AMPHETAMINE INTERP: NEGATIVE
PM AMPHETAMINE: NEGATIVE NG/ML
PM BARBURATES INTERP: NEGATIVE
PM BEG: NEGATIVE NG/ML
PM BENZODIAZEPINES INTERP: POSITIVE
PM BUPRENORPHINE INTERP: NEGATIVE
PM BUPRENORPHINE: NEGATIVE NG/ML
PM BUTALBITAL: NEGATIVE NG/ML
PM CLONAZEPAM: NEGATIVE NG/ML
PM COCAINE INTERP: NEGATIVE
PM COCAINE: NEGATIVE NG/ML
PM CODIENE: NEGATIVE NG/ML
PM COTININE: >1000 NG/ML
PM DIAZEPAM: NEGATIVE NG/ML
PM DIHYROCODEINE: NEGATIVE NG/ML
PM EDDP: NEGATIVE NG/ML
PM FENTANYL INTERP: NEGATIVE
PM FENTANYL: NEGATIVE NG/ML
PM FLUNITRAZEPAM: NEGATIVE NG/ML
PM FLURAZEPAM: NEGATIVE NG/ML
PM HYDROCODONE: NEGATIVE NG/ML
PM HYDROMORPHONE: NEGATIVE NG/ML
PM LORAZEPAM: NEGATIVE NG/ML
PM MARIJUANA (DELTA-9-THC): NEGATIVE NG/ML
PM MARIJUANA INTERP: NEGATIVE
PM MDA: NEGATIVE NG/ML
PM MDEA: NEGATIVE NG/ML
PM MDMA: NEGATIVE NG/ML
PM MEPERIDINE: NEGATIVE NG/ML
PM METHADONE INTERP: NEGATIVE
PM METHADONE: NEGATIVE NG/ML
PM METHAMPHETAMINE: NEGATIVE NG/ML
PM MIDAZOLAM: NEGATIVE NG/ML
PM MORPHINE: >1000 NG/ML
PM NALOXONE: NEGATIVE NG/ML
PM NALTREXONE: NEGATIVE NG/ML
PM NICOTINE INTERP: POSITIVE
PM NORBUPRENORPHINE: NEGATIVE NG/ML
PM NORDIAZEPAM: NEGATIVE NG/ML
PM NORFENTANYL: NEGATIVE NG/ML
PM NORMEPERIDINE: NEGATIVE NG/ML
PM NOROXYCODONE: NEGATIVE NG/ML
PM OPIOID INTERP: POSITIVE
PM OXAZEPAM: NEGATIVE NG/ML
PM OXXYCODONE INTERP: NEGATIVE
PM OXYCODONE: NEGATIVE NG/ML
PM OXYMORPHONE: NEGATIVE NG/ML
PM PCP: NEGATIVE NG/ML
PM PHENCYCLIDINE INTERP: NEGATIVE
PM PHENOBARBITAL: NEGATIVE NG/ML
PM PPX: NEGATIVE NG/ML
PM PROPOXYPHENE INTERP: NEGATIVE
PM SECOBARBITAL: NEGATIVE NG/ML
PM SUFENTANIL: NEGATIVE NG/ML
PM TAPENTADOL: NEGATIVE NG/ML
PM TEMAZEPAM: NEGATIVE NG/ML
PM TRAMADOL INTERP: NEGATIVE
PM TRAMADOL: NEGATIVE NG/ML

## 2024-02-06 ENCOUNTER — APPOINTMENT (OUTPATIENT)
Dept: VASCULAR SURGERY | Facility: CLINIC | Age: 54
End: 2024-02-06
Payer: MEDICARE

## 2024-02-06 DIAGNOSIS — M31.6 OTHER GIANT CELL ARTERITIS: ICD-10-CM

## 2024-02-06 PROCEDURE — 99212 OFFICE O/P EST SF 10 MIN: CPT

## 2024-02-06 NOTE — HISTORY OF PRESENT ILLNESS
[FreeTextEntry1] : The patient is a 53-year-old female who presented to the office with temporal artery headaches and an elevated send variant of 29 as well as a elevated C-reactive protein of 5.9.  The patient was referred here for a temporal artery biopsy.  On January 10 I performed a bilateral temporal artery biopsy.  The patient is currently on 50 mg prednisone.  She states her headaches are still present.

## 2024-02-06 NOTE — ASSESSMENT
[FreeTextEntry1] : The patient is a 53-year-old female status post temporal artery biopsy.  I reviewed the pathology there was no evidence of temporal arteritis or giant cell arteritis present.  I recommend the patient continue to follow-up with her primary care doctor and rheumatology.  No vascular surgery intervention warranted at this time.    I, Dr. Waqas Guan, personally performed the evaluation and management (E/M) services for this established patient who presents today with (a) new problem(s)/exacerbation of (an) existing condition(s). That E/M includes conducting the clinically appropriate interval history &/or exam, assessing all new/exacerbated conditions, and establishing a new plan of care. Today, my DEYANIRA, Rachele Nolan PA-C, was here to observe my evaluation and management service for this new problem/exacerbated condition and follow the plan of care established by me going forward.

## 2024-02-14 ENCOUNTER — APPOINTMENT (OUTPATIENT)
Dept: PAIN MANAGEMENT | Facility: CLINIC | Age: 54
End: 2024-02-14

## 2024-02-14 ENCOUNTER — APPOINTMENT (OUTPATIENT)
Dept: PAIN MANAGEMENT | Facility: CLINIC | Age: 54
End: 2024-02-14
Payer: MEDICARE

## 2024-02-14 DIAGNOSIS — M79.89 OTHER SPECIFIED SOFT TISSUE DISORDERS: ICD-10-CM

## 2024-02-14 PROCEDURE — 99214 OFFICE O/P EST MOD 30 MIN: CPT

## 2024-02-14 RX ORDER — MORPHINE SULFATE 60 MG/1
60 TABLET, FILM COATED, EXTENDED RELEASE ORAL
Qty: 180 | Refills: 0 | Status: DISCONTINUED | COMMUNITY
Start: 2022-08-02 | End: 2024-02-14

## 2024-02-14 RX ORDER — MORPHINE SULFATE 15 MG/1
15 TABLET, FILM COATED, EXTENDED RELEASE ORAL
Qty: 10 | Refills: 0 | Status: DISCONTINUED | COMMUNITY
Start: 2022-08-02 | End: 2024-02-14

## 2024-02-15 PROBLEM — M79.89 LEFT LEG SWELLING: Status: ACTIVE | Noted: 2022-03-01

## 2024-02-15 NOTE — DISCUSSION/SUMMARY
[de-identified] : This is a 53-year-old female with chronic pain. She has a history of lupus and APS. She continues with multi-site pains. She is medically managed on  Morphine Sulfate IR 15 mg BID. She was decreased on her last visit from MS Contin 60 mg 2 tabs TID to 5 tabs daily. Today, we decreased her to  MS Contin 30 mg 2 tabs TID. She is aware that we will continue to decrease her if she chooses to stay at our practice, pt states that she is in contact with Dr. Gama's office and states that she will be transferring her care there in the future.   I have consulted the  registry for the purpose of reviewing the patient's controlled substance.  The patient denies any adverse side effects due to the medication (sleeping disturbance, constipation, sleepiness, hallucinations and/or urination problems).   I explained the risk of addiction, tolerance and withdrawals and  advised the patient they must keep their medication under a lock and key, or in a safe place away from children or other individuals. This medication given is solely for the patient and under no circumstances to be shared. Patient verbalized this and is in agreement with the aforementioned. I explained the risk of addiction, tolerance, and withdrawals. UDS will be done randomly and a drug agreement was signed.  The patient will return to the office in 4 weeks' time and is aware to contact me with any question or concerns in the interim.  ROSIE Guevara MD

## 2024-02-15 NOTE — HISTORY OF PRESENT ILLNESS
[FreeTextEntry1] : ORIGINAL PRESENTATION: Ms. Bauman is a 53-year-old woman who has been a patient in our office since 1996. At that time, she presented with complaints of lower back pain during pregnancy. Ultimately, the patient underwent a right L4-5 laminectomy over 20 years ago by Dr. Andre Carter. However, she still struggles with chronic pain. The patient has been medically managed at that and has trialed various preparations including Lortab, Vicodin, Dilaudid, Morphine, OxyContin, and methadone. She also has multiple complaints of pain secondary to a pacemaker having been placed. She also claims to have a history of throat cancer; however, we do not have any evidence of any cancer diagnosis. She also has a history of connective tissue disorders such as lupus and rheumatoid arthritis.  PATIENT PRESENTS FOR FOLLOW UP: She is under our care for chronic pain secondary to lupus and lumbar issues which she is receiving continuing active treatment for. She has antiphospholipid syndrome which unfortunately has led to frequent hospitalizations as of late. She was admitted for gastroparesis most recently and was started on Reglan. She has ongoing cardiac issues and is under the care of a cardiologist who she sees routinely.  She is medically managed at our office. She was previously on MS Contin 60 mg 2 tabs TID in conjunction with morphine sulfate 15 mg BID. She was decreased to MS Contin 60 mg 5 tablets a day. Today, we are planning to decrease her to MS Contin 60 mg 2 tablets BID. However, the patient and her  are requesting that she gets MS Contin 30 two tablets TID which they feel will control her pain better. We will send MS Contin 30  2 tablets PO TID. Patient states that she is looking for other pain management providers to take over her medication management. In the meantime, she is aware that we will continue to decrease her medications if she chooses to continue seeing us at our practice.   UDS: 1/17/24 - Consistent. It should be noted that a previous UDS done in 2022 showed a false positive ANGELA reading.

## 2024-02-23 ENCOUNTER — APPOINTMENT (OUTPATIENT)
Dept: PULMONOLOGY | Facility: CLINIC | Age: 54
End: 2024-02-23
Payer: MEDICARE

## 2024-02-23 VITALS
WEIGHT: 168 LBS | DIASTOLIC BLOOD PRESSURE: 85 MMHG | HEIGHT: 63 IN | SYSTOLIC BLOOD PRESSURE: 115 MMHG | BODY MASS INDEX: 29.77 KG/M2 | HEART RATE: 107 BPM | OXYGEN SATURATION: 96 %

## 2024-02-23 DIAGNOSIS — J44.9 CHRONIC OBSTRUCTIVE PULMONARY DISEASE, UNSPECIFIED: ICD-10-CM

## 2024-02-23 DIAGNOSIS — J45.909 UNSPECIFIED ASTHMA, UNCOMPLICATED: ICD-10-CM

## 2024-02-23 PROCEDURE — G2211 COMPLEX E/M VISIT ADD ON: CPT

## 2024-02-23 PROCEDURE — 99213 OFFICE O/P EST LOW 20 MIN: CPT

## 2024-02-23 RX ORDER — FLUTICASONE FUROATE, UMECLIDINIUM BROMIDE AND VILANTEROL TRIFENATATE 100; 62.5; 25 UG/1; UG/1; UG/1
100-62.5-25 POWDER RESPIRATORY (INHALATION)
Qty: 60 | Refills: 5 | Status: ACTIVE | COMMUNITY
Start: 2024-02-23 | End: 1900-01-01

## 2024-02-23 RX ORDER — ALBUTEROL SULFATE 90 UG/1
108 (90 BASE) INHALANT RESPIRATORY (INHALATION) EVERY 4 HOURS
Qty: 6.7 | Refills: 5 | Status: ACTIVE | COMMUNITY
Start: 2024-02-23 | End: 1900-01-01

## 2024-02-28 ENCOUNTER — NON-APPOINTMENT (OUTPATIENT)
Age: 54
End: 2024-02-28

## 2024-03-14 ENCOUNTER — APPOINTMENT (OUTPATIENT)
Dept: PAIN MANAGEMENT | Facility: CLINIC | Age: 54
End: 2024-03-14
Payer: MEDICARE

## 2024-03-14 DIAGNOSIS — M51.16 INTERVERTEBRAL DISC DISORDERS WITH RADICULOPATHY, LUMBAR REGION: ICD-10-CM

## 2024-03-14 DIAGNOSIS — Z79.899 OTHER LONG TERM (CURRENT) DRUG THERAPY: ICD-10-CM

## 2024-03-14 DIAGNOSIS — Z79.891 LONG TERM (CURRENT) USE OF OPIATE ANALGESIC: ICD-10-CM

## 2024-03-14 DIAGNOSIS — G89.29 LUMBAGO WITH SCIATICA, RIGHT SIDE: ICD-10-CM

## 2024-03-14 DIAGNOSIS — M06.9 RHEUMATOID ARTHRITIS, UNSPECIFIED: ICD-10-CM

## 2024-03-14 DIAGNOSIS — M32.9 SYSTEMIC LUPUS ERYTHEMATOSUS, UNSPECIFIED: ICD-10-CM

## 2024-03-14 DIAGNOSIS — M54.41 LUMBAGO WITH SCIATICA, RIGHT SIDE: ICD-10-CM

## 2024-03-14 DIAGNOSIS — M96.1 POSTLAMINECTOMY SYNDROME, NOT ELSEWHERE CLASSIFIED: ICD-10-CM

## 2024-03-14 DIAGNOSIS — G89.4 CHRONIC PAIN SYNDROME: ICD-10-CM

## 2024-03-14 PROCEDURE — 99214 OFFICE O/P EST MOD 30 MIN: CPT

## 2024-03-14 RX ORDER — MORPHINE SULFATE 15 MG/1
15 TABLET ORAL
Qty: 60 | Refills: 0 | Status: DISCONTINUED | COMMUNITY
Start: 2023-11-13 | End: 2024-03-14

## 2024-03-14 RX ORDER — MORPHINE SULFATE 15 MG/1
15 TABLET ORAL
Qty: 60 | Refills: 0 | Status: ACTIVE | COMMUNITY
Start: 2022-11-16 | End: 1900-01-01

## 2024-03-14 RX ORDER — MORPHINE SULFATE 30 MG/1
30 TABLET, FILM COATED, EXTENDED RELEASE ORAL
Qty: 210 | Refills: 0 | Status: ACTIVE | COMMUNITY
Start: 2022-09-12 | End: 1900-01-01

## 2024-03-14 RX ORDER — MORPHINE SULFATE 60 MG/1
60 TABLET, FILM COATED, EXTENDED RELEASE ORAL 3 TIMES DAILY
Qty: 30 | Refills: 0 | Status: DISCONTINUED | COMMUNITY
Start: 2022-07-05 | End: 2024-03-14

## 2024-03-14 RX ORDER — MORPHINE SULFATE 60 MG/1
60 TABLET, FILM COATED, EXTENDED RELEASE ORAL
Qty: 117 | Refills: 0 | Status: DISCONTINUED | COMMUNITY
Start: 2023-11-13 | End: 2024-03-14

## 2024-03-14 NOTE — REASON FOR VISIT
[Follow-Up Visit] : a follow-up pain management visit [Spouse] : spouse [FreeTextEntry2] : FOLLOW UP

## 2024-03-14 NOTE — PHYSICAL EXAM
[Normal Coordination] : normal coordination [Normal DTR UE/LE] : normal DTR UE/LE  [Normal Mood and Affect] : normal mood and affect [Normal Sensation] : normal sensation [Well Developed] : well developed [Well Nourished] : well nourished [Oriented] : oriented

## 2024-03-14 NOTE — HISTORY OF PRESENT ILLNESS
[FreeTextEntry1] : ORIGINAL PRESENTATION: Ms. Bauman is a 53-year-old woman who has been a patient in our office since 1996. At that time, she presented with complaints of lower back pain during pregnancy. Ultimately, the patient underwent a right L4-5 laminectomy over 20 years ago by Dr. Andre Carter. However, she still struggles with chronic pain. The patient has been medically managed at that and has trialed various preparations including Lortab, Vicodin, Dilaudid, Morphine, OxyContin, and methadone. She also has multiple complaints of pain secondary to a pacemaker having been placed. She also claims to have a history of throat cancer; however, we do not have any evidence of any cancer diagnosis. She also has a history of connective tissue disorders such as lupus and rheumatoid arthritis.  PATIENT PRESENTS FOR FOLLOW UP:  She is under our care for chronic pain secondary to lupus and lumbar issues which she is receiving continuing active treatment for. She has antiphospholipid syndrome which unfortunately has led her to have frequent hospitalizations. She has gastroparesis. She has ongoing cardiac issues and is under the care of a cardiologist who she sees routinely.  She has been weaning off of her Morphine. She is currently on morphine sulfate 15 mg BID and MS Contin 30 two tablets 4 times a day. Today, we will decrease her to MS Contin 30 mg (7 times a day instead of 8) and she will continue morphine sulfate 15 mg BID. Pt states that she has decided to seek care elsewhere, she has an appointment with Dr. Corey on April 3rd, 2024. We signed a disengagement form today, she is aware that we will no longer prescribe her narcotics going forward.   UDS: 1/17/24 - Consistent. It should be noted that a previous UDS done in 2022 showed a false positive ANGELA reading.  Of note, she states that she will be getting an abdominal surgery in the next few weeks.

## 2024-03-14 NOTE — ASSESSMENT
[FreeTextEntry1] : This is a 53-year-old female with chronic pain. She has a history of lupus and APS. She continues with multi-site pains. She is medically managed on Morphine, which she is being weaned off of. Today we will decrease her to MS Contin 30 mg (7 times a day instead of 8) and she will continue morphine sulfate 15 mg BID. She is scheduled to see Dr. Corey on 4/3/24. She is aware that we will no longer prescribe her narcotics going forward. F/u PRN.  I have consulted the  registry for the purpose of reviewing the patient's controlled substance.   ROSIE Guevara MD

## 2024-04-11 ENCOUNTER — OUTPATIENT (OUTPATIENT)
Dept: OUTPATIENT SERVICES | Facility: HOSPITAL | Age: 54
LOS: 1 days | End: 2024-04-11
Payer: MEDICARE

## 2024-04-11 ENCOUNTER — RESULT REVIEW (OUTPATIENT)
Age: 54
End: 2024-04-11

## 2024-04-11 DIAGNOSIS — Z90.710 ACQUIRED ABSENCE OF BOTH CERVIX AND UTERUS: Chronic | ICD-10-CM

## 2024-04-11 DIAGNOSIS — Z98.890 OTHER SPECIFIED POSTPROCEDURAL STATES: Chronic | ICD-10-CM

## 2024-04-11 DIAGNOSIS — Z90.49 ACQUIRED ABSENCE OF OTHER SPECIFIED PARTS OF DIGESTIVE TRACT: Chronic | ICD-10-CM

## 2024-04-11 DIAGNOSIS — I77.1 STRICTURE OF ARTERY: Chronic | ICD-10-CM

## 2024-04-11 DIAGNOSIS — Z00.8 ENCOUNTER FOR OTHER GENERAL EXAMINATION: ICD-10-CM

## 2024-04-11 DIAGNOSIS — Z95.810 PRESENCE OF AUTOMATIC (IMPLANTABLE) CARDIAC DEFIBRILLATOR: Chronic | ICD-10-CM

## 2024-04-11 DIAGNOSIS — R06.02 SHORTNESS OF BREATH: ICD-10-CM

## 2024-04-11 PROCEDURE — 71250 CT THORAX DX C-: CPT | Mod: 26,MH

## 2024-04-11 PROCEDURE — 71250 CT THORAX DX C-: CPT

## 2024-04-12 DIAGNOSIS — R06.02 SHORTNESS OF BREATH: ICD-10-CM

## 2024-05-10 ENCOUNTER — APPOINTMENT (OUTPATIENT)
Dept: OTOLARYNGOLOGY | Facility: CLINIC | Age: 54
End: 2024-05-10

## 2024-05-19 ENCOUNTER — INPATIENT (INPATIENT)
Facility: HOSPITAL | Age: 54
LOS: 6 days | Discharge: HOME | DRG: 195 | End: 2024-05-26
Attending: INTERNAL MEDICINE | Admitting: STUDENT IN AN ORGANIZED HEALTH CARE EDUCATION/TRAINING PROGRAM
Payer: MEDICARE

## 2024-05-19 VITALS
DIASTOLIC BLOOD PRESSURE: 81 MMHG | SYSTOLIC BLOOD PRESSURE: 139 MMHG | HEART RATE: 93 BPM | OXYGEN SATURATION: 98 % | RESPIRATION RATE: 19 BRPM | TEMPERATURE: 98 F

## 2024-05-19 DIAGNOSIS — Z85.21 PERSONAL HISTORY OF MALIGNANT NEOPLASM OF LARYNX: ICD-10-CM

## 2024-05-19 DIAGNOSIS — Z98.890 OTHER SPECIFIED POSTPROCEDURAL STATES: Chronic | ICD-10-CM

## 2024-05-19 DIAGNOSIS — Z79.01 LONG TERM (CURRENT) USE OF ANTICOAGULANTS: ICD-10-CM

## 2024-05-19 DIAGNOSIS — Z87.891 PERSONAL HISTORY OF NICOTINE DEPENDENCE: ICD-10-CM

## 2024-05-19 DIAGNOSIS — I31.39 OTHER PERICARDIAL EFFUSION (NONINFLAMMATORY): ICD-10-CM

## 2024-05-19 DIAGNOSIS — Z95.810 PRESENCE OF AUTOMATIC (IMPLANTABLE) CARDIAC DEFIBRILLATOR: ICD-10-CM

## 2024-05-19 DIAGNOSIS — M32.9 SYSTEMIC LUPUS ERYTHEMATOSUS, UNSPECIFIED: ICD-10-CM

## 2024-05-19 DIAGNOSIS — D84.9 IMMUNODEFICIENCY, UNSPECIFIED: ICD-10-CM

## 2024-05-19 DIAGNOSIS — I25.2 OLD MYOCARDIAL INFARCTION: ICD-10-CM

## 2024-05-19 DIAGNOSIS — Z86.718 PERSONAL HISTORY OF OTHER VENOUS THROMBOSIS AND EMBOLISM: ICD-10-CM

## 2024-05-19 DIAGNOSIS — Z90.49 ACQUIRED ABSENCE OF OTHER SPECIFIED PARTS OF DIGESTIVE TRACT: Chronic | ICD-10-CM

## 2024-05-19 DIAGNOSIS — Z85.41 PERSONAL HISTORY OF MALIGNANT NEOPLASM OF CERVIX UTERI: ICD-10-CM

## 2024-05-19 DIAGNOSIS — I50.32 CHRONIC DIASTOLIC (CONGESTIVE) HEART FAILURE: ICD-10-CM

## 2024-05-19 DIAGNOSIS — J44.0 CHRONIC OBSTRUCTIVE PULMONARY DISEASE WITH ACUTE LOWER RESPIRATORY INFECTION: ICD-10-CM

## 2024-05-19 DIAGNOSIS — G89.29 OTHER CHRONIC PAIN: ICD-10-CM

## 2024-05-19 DIAGNOSIS — J96.01 ACUTE RESPIRATORY FAILURE WITH HYPOXIA: ICD-10-CM

## 2024-05-19 DIAGNOSIS — Z95.810 PRESENCE OF AUTOMATIC (IMPLANTABLE) CARDIAC DEFIBRILLATOR: Chronic | ICD-10-CM

## 2024-05-19 DIAGNOSIS — Z95.820 PERIPHERAL VASCULAR ANGIOPLASTY STATUS WITH IMPLANTS AND GRAFTS: ICD-10-CM

## 2024-05-19 DIAGNOSIS — I27.82 CHRONIC PULMONARY EMBOLISM: ICD-10-CM

## 2024-05-19 DIAGNOSIS — I11.0 HYPERTENSIVE HEART DISEASE WITH HEART FAILURE: ICD-10-CM

## 2024-05-19 DIAGNOSIS — Z91.048 OTHER NONMEDICINAL SUBSTANCE ALLERGY STATUS: ICD-10-CM

## 2024-05-19 DIAGNOSIS — M06.9 RHEUMATOID ARTHRITIS, UNSPECIFIED: ICD-10-CM

## 2024-05-19 DIAGNOSIS — D68.61 ANTIPHOSPHOLIPID SYNDROME: ICD-10-CM

## 2024-05-19 DIAGNOSIS — I82.B22 CHRONIC EMBOLISM AND THROMBOSIS OF LEFT SUBCLAVIAN VEIN: ICD-10-CM

## 2024-05-19 DIAGNOSIS — J15.9 UNSPECIFIED BACTERIAL PNEUMONIA: ICD-10-CM

## 2024-05-19 DIAGNOSIS — J44.9 CHRONIC OBSTRUCTIVE PULMONARY DISEASE, UNSPECIFIED: ICD-10-CM

## 2024-05-19 DIAGNOSIS — Z90.710 ACQUIRED ABSENCE OF BOTH CERVIX AND UTERUS: Chronic | ICD-10-CM

## 2024-05-19 DIAGNOSIS — I77.1 STRICTURE OF ARTERY: Chronic | ICD-10-CM

## 2024-05-19 DIAGNOSIS — B34.1 ENTEROVIRUS INFECTION, UNSPECIFIED: ICD-10-CM

## 2024-05-19 DIAGNOSIS — Z88.8 ALLERGY STATUS TO OTHER DRUGS, MEDICAMENTS AND BIOLOGICAL SUBSTANCES STATUS: ICD-10-CM

## 2024-05-19 LAB
ALBUMIN SERPL ELPH-MCNC: 3.9 G/DL — SIGNIFICANT CHANGE UP (ref 3.5–5.2)
ALP SERPL-CCNC: 82 U/L — SIGNIFICANT CHANGE UP (ref 30–115)
ALT FLD-CCNC: 11 U/L — SIGNIFICANT CHANGE UP (ref 0–41)
ANION GAP SERPL CALC-SCNC: 10 MMOL/L — SIGNIFICANT CHANGE UP (ref 7–14)
AST SERPL-CCNC: 8 U/L — SIGNIFICANT CHANGE UP (ref 0–41)
BASE EXCESS BLDV CALC-SCNC: 5.1 MMOL/L — HIGH (ref -2–3)
BASOPHILS # BLD AUTO: 0.03 K/UL — SIGNIFICANT CHANGE UP (ref 0–0.2)
BASOPHILS NFR BLD AUTO: 0.2 % — SIGNIFICANT CHANGE UP (ref 0–1)
BILIRUB SERPL-MCNC: 0.3 MG/DL — SIGNIFICANT CHANGE UP (ref 0.2–1.2)
BUN SERPL-MCNC: 10 MG/DL — SIGNIFICANT CHANGE UP (ref 10–20)
CA-I SERPL-SCNC: 1.26 MMOL/L — SIGNIFICANT CHANGE UP (ref 1.15–1.33)
CALCIUM SERPL-MCNC: 9.4 MG/DL — SIGNIFICANT CHANGE UP (ref 8.4–10.5)
CHLORIDE SERPL-SCNC: 102 MMOL/L — SIGNIFICANT CHANGE UP (ref 98–110)
CO2 SERPL-SCNC: 30 MMOL/L — SIGNIFICANT CHANGE UP (ref 17–32)
CREAT SERPL-MCNC: 0.9 MG/DL — SIGNIFICANT CHANGE UP (ref 0.7–1.5)
EGFR: 76 ML/MIN/1.73M2 — SIGNIFICANT CHANGE UP
EOSINOPHIL # BLD AUTO: 0.08 K/UL — SIGNIFICANT CHANGE UP (ref 0–0.7)
EOSINOPHIL NFR BLD AUTO: 0.6 % — SIGNIFICANT CHANGE UP (ref 0–8)
GAS PNL BLDV: 138 MMOL/L — SIGNIFICANT CHANGE UP (ref 136–145)
GAS PNL BLDV: SIGNIFICANT CHANGE UP
GAS PNL BLDV: SIGNIFICANT CHANGE UP
GLUCOSE SERPL-MCNC: 110 MG/DL — HIGH (ref 70–99)
HCO3 BLDV-SCNC: 31 MMOL/L — HIGH (ref 22–29)
HCT VFR BLD CALC: 36.4 % — LOW (ref 37–47)
HCT VFR BLDA CALC: 38 % — SIGNIFICANT CHANGE UP (ref 34.5–46.5)
HGB BLD CALC-MCNC: 12.6 G/DL — SIGNIFICANT CHANGE UP (ref 11.7–16.1)
HGB BLD-MCNC: 12.1 G/DL — SIGNIFICANT CHANGE UP (ref 12–16)
IMM GRANULOCYTES NFR BLD AUTO: 0.4 % — HIGH (ref 0.1–0.3)
LACTATE BLDV-MCNC: 1 MMOL/L — SIGNIFICANT CHANGE UP (ref 0.5–2)
LYMPHOCYTES # BLD AUTO: 29.7 % — SIGNIFICANT CHANGE UP (ref 20.5–51.1)
LYMPHOCYTES # BLD AUTO: 3.78 K/UL — HIGH (ref 1.2–3.4)
MCHC RBC-ENTMCNC: 29.7 PG — SIGNIFICANT CHANGE UP (ref 27–31)
MCHC RBC-ENTMCNC: 33.2 G/DL — SIGNIFICANT CHANGE UP (ref 32–37)
MCV RBC AUTO: 89.2 FL — SIGNIFICANT CHANGE UP (ref 81–99)
MONOCYTES # BLD AUTO: 0.84 K/UL — HIGH (ref 0.1–0.6)
MONOCYTES NFR BLD AUTO: 6.6 % — SIGNIFICANT CHANGE UP (ref 1.7–9.3)
NEUTROPHILS # BLD AUTO: 7.94 K/UL — HIGH (ref 1.4–6.5)
NEUTROPHILS NFR BLD AUTO: 62.5 % — SIGNIFICANT CHANGE UP (ref 42.2–75.2)
NRBC # BLD: 0 /100 WBCS — SIGNIFICANT CHANGE UP (ref 0–0)
NT-PROBNP SERPL-SCNC: 901 PG/ML — HIGH (ref 0–300)
PCO2 BLDV: 50 MMHG — HIGH (ref 39–42)
PH BLDV: 7.4 — SIGNIFICANT CHANGE UP (ref 7.32–7.43)
PLATELET # BLD AUTO: 231 K/UL — SIGNIFICANT CHANGE UP (ref 130–400)
PMV BLD: 11.2 FL — HIGH (ref 7.4–10.4)
PO2 BLDV: 27 MMHG — SIGNIFICANT CHANGE UP (ref 25–45)
POTASSIUM BLDV-SCNC: 3.2 MMOL/L — LOW (ref 3.5–5.1)
POTASSIUM SERPL-MCNC: 3.3 MMOL/L — LOW (ref 3.5–5)
POTASSIUM SERPL-SCNC: 3.3 MMOL/L — LOW (ref 3.5–5)
PROT SERPL-MCNC: 6.6 G/DL — SIGNIFICANT CHANGE UP (ref 6–8)
RBC # BLD: 4.08 M/UL — LOW (ref 4.2–5.4)
RBC # FLD: 13.6 % — SIGNIFICANT CHANGE UP (ref 11.5–14.5)
SAO2 % BLDV: 49.8 % — LOW (ref 67–88)
SODIUM SERPL-SCNC: 142 MMOL/L — SIGNIFICANT CHANGE UP (ref 135–146)
TROPONIN T, HIGH SENSITIVITY RESULT: 6 NG/L — SIGNIFICANT CHANGE UP (ref 6–13)
TROPONIN T, HIGH SENSITIVITY RESULT: 7 NG/L — SIGNIFICANT CHANGE UP (ref 6–13)
WBC # BLD: 12.72 K/UL — HIGH (ref 4.8–10.8)
WBC # FLD AUTO: 12.72 K/UL — HIGH (ref 4.8–10.8)

## 2024-05-19 PROCEDURE — 71275 CT ANGIOGRAPHY CHEST: CPT | Mod: 26,MC

## 2024-05-19 PROCEDURE — 93010 ELECTROCARDIOGRAM REPORT: CPT

## 2024-05-19 PROCEDURE — 99285 EMERGENCY DEPT VISIT HI MDM: CPT | Mod: FS

## 2024-05-19 RX ORDER — KETOROLAC TROMETHAMINE 30 MG/ML
15 SYRINGE (ML) INJECTION ONCE
Refills: 0 | Status: DISCONTINUED | OUTPATIENT
Start: 2024-05-19 | End: 2024-05-19

## 2024-05-19 RX ORDER — AZITHROMYCIN 500 MG/1
500 TABLET, FILM COATED ORAL ONCE
Refills: 0 | Status: COMPLETED | OUTPATIENT
Start: 2024-05-19 | End: 2024-05-19

## 2024-05-19 RX ADMIN — Medication 15 MILLIGRAM(S): at 22:42

## 2024-05-19 RX ADMIN — AZITHROMYCIN 255 MILLIGRAM(S): 500 TABLET, FILM COATED ORAL at 23:58

## 2024-05-19 NOTE — ED ADULT NURSE NOTE - NSFALLUNIVINTERV_ED_ALL_ED
Bed/Stretcher in lowest position, wheels locked, appropriate side rails in place/Call bell, personal items and telephone in reach/Instruct patient to call for assistance before getting out of bed/chair/stretcher/Non-slip footwear applied when patient is off stretcher/[place] to call system/Physically safe environment - no spills, clutter or unnecessary equipment/Purposeful proactive rounding/Room/bathroom lighting operational, light cord in reach

## 2024-05-19 NOTE — ED ADULT NURSE NOTE - EXTENSIONS OF SELF_ADULT
We received a remote transmission from patient's monitor at home. Remote Linq report shows AF. EP physician to review. We will continue to monitor remotely. Implanted for AF management. Pt is on Eliquis. End of 31-day monitoring period 3-14-23.
None

## 2024-05-19 NOTE — ED PROVIDER NOTE - PATIENT'S SEXUAL ORIENTATION
Post Acute Skilled Nursing Home Initial Visit Note     Date of Service: 1/6/2020  Location seen at: [facility] SKILLED NURSING  Subacute / Skilled Need: Rehabilitation    PCP: Demetrius Baker DO   Patient Care Team:  Demetrius Baker DO as PCP - General (Internal Medicine)  Destiney Jacinto, RN as Care Transitions RN (Registered Nurse)  Ryne Roland MD as Post Acute Facility Provider: Physician (Geriatric Medicine)  Arsen Woods CNP as Post Acute Facility Provider: APC (Nurse Practitioner - Family)  Seen by Arsen Woods CNP today    Admitted from [facility] 1/1-1/3/2019    Gracie Haynes is a 73 year old female presenting to Post Acute Skilled Nursing for: SABINA    History of Present Illness:     This is a 73-year-old female with PMH of paroxysmal A. fib on Eliquis, CKD stage III, CHF, HTN who was recently hospitalized from 12/29-12/30 due to epistaxis.  Patient was readmitted on 1/1 due to nausea, headache.  Patient states despite holding her aspirin and Eliquis she continued to have postnasal bloody drainage.  ENT was consulted, patient had Rhino Rocket placed and removed.  Patient was treated with Afrin spray, Ocean nasal spray.  Patient's hemoglobin remained stable.  Patient's Eliquis has been on hold since 12/29.  Patient had Compazine as needed for nausea.  Patient is to follow-up with ENT in 2 weeks.  ENT recommended no nose blowing and open mouth sneezing for next 1 to 2 weeks.  Patient may hold, however resumed upon discharge.  Patient was optimized and transferred to [facility] for continued care and rehab.    PLOF: Patient lives in a condo, independent prior to recent hospitalization.    1/6/2020: Patient seen and examined.  Patient sitting up at edge of bed not in any distress.  Patient states her nosebleeding has resolved.  Patient states she continues to have small streaks of blood when she coughs.  She denies any black or bloody stools, chest pain, shortness of breath, nausea, vomiting,  diarrhea, abdominal pain, fever/chills, headaches, visual changes.  Patient states she has chronic back pain with sciatica and sees [facility] interventional pain team.  She normally sleeps on a recliner which helps with her back, which she has requested.  I also updated nursing to request for recliner.  Patient states she uses home O2 mainly intermittently 2L O2/NC.  Per nursing no other issues reported.    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 She smoked 0.00 packs per day. She has never used smokeless tobacco. She reports current alcohol use. She reports previous drug use.  Past Surgical History:   Procedure Laterality Date   • Basal cell carcinoma excision     • Cyst removal      upper back   • Gallbladder surgery     • Hip arthroplasty Bilateral 2010 and 2012    Right 2010 and Left 2012     Family History   Problem Relation Age of Onset   • Myocardial Infarction Mother    • Stroke/TIA Mother    • Hypertension Mother    • Myocardial Infarction Father    • Eczema Father    • Hodgkin's Lymphoma Sister    • Diabetes Sister    • Myocardial Infarction Brother      History      Last reviewed in this visit by Arsen Woods CNP on 1/6/2020 at  3:30 PM    Sections Reviewed    Medical, Surgical, Family, Tobacco, Alcohol, Drug Use, Sexual Activity, Custom          PROBLEM LIST:  Patient Active Problem List   Diagnosis   • Anemia   • Hyperlipidemia   • Hypertension   • Hypomagnesemia   • Heart failure with preserved ejection fraction (CMS/HCC)   • CKD (chronic kidney disease), stage III (CMS/[facility])   • Vitamin D deficiency   • Osteoarthritis, multiple sites   • Morbid obesity (CMS/[facility])   • Hypokalemia   • Hypertensive heart and renal disease with heart failure (CMS/HCC)   • Increased appetite   • Need for prophylactic vaccination and inoculation against influenza   • Dyspnea   • Maceration of skin   • Allergic rhinitis   • Asthma   • Irregular heart rhythm   • Lumbar canal stenosis   • Lumbar spondylosis   • History of skin cancer   • Sleep apnea   • Eczema   • COPD (chronic obstructive pulmonary disease) (CMS/[facility])   • Paroxysmal atrial fibrillation (CMS/HCC)   • Perianal erythema   • Chest discomfort   • Leg swelling   • Leg pain, anterior   • Wart   • Body mass index (BMI) 50-59.9, adult   • Acute and chronic respiratory failure with hypoxia (CMS/[facility])   • Pleural effusion   • Rosacea   • Essential hypertriglyceridemia   • Acute systolic congestive heart failure (CMS/[facility])   • Cough       ADVANCE DIRECTIVES:  Power of  Status:  Surrogate decision maker, daughter, Geneva Lorenzo  Code Status:  on file and DNR (do not resuscitate)  Goals of Care: return home  1/6/20: Patient wishes to rehab and return home.  DNR on file    DEPRESSION SCREENING:  Recent PHQ 2/9 Score    PHQ 2:  Date Adult PHQ 2 Score   1/6/2020 0       PHQ 9:       DEPRESSION ASSESSMENT/PLAN:  Depression screening is negative no further plan needed.    ALLERGIES:  Allergies as of 01/06/2020 - Reviewed 12/23/2019   Allergen Reaction Noted   • Adhesive   (environmental) RASH 09/17/2019   • Grass Other (See Comments)  04/12/2019   • Plastibase Other (See Comments) 11/30/2012   • Seasonal Other (See Comments) 09/17/2019   • Hydrocodone Other (See Comments) 12/26/2019       CURRENT MEDICATIONS:   Current Outpatient Medications   Medication Sig Dispense Refill   • MAGNESIUM OXIDE PO Take 400 mg by mouth daily.     • hydroCORTisone (CORTIZONE) 2.5 % ointment Apply 1 application topically 2 times daily as needed.     • metroNIDAZOLE 0.75 % lotion Apply 1 application topically 2 times daily as needed. rosacea     • sodium chloride (OCEAN) 0.65 % nasal spray Spray 1 spray in each nostril every 4 hours.     • carvedilol (COREG) 6.25 MG tablet Take 6.25 mg by mouth every 12 hours.     • Cholecalciferol 50 mcg (2,000 units) tablet Take 50 mcg by mouth daily.     • dextromethorphan-guaifenesin (ROBITUSSIN-DM)  MG/5ML syrup Take 2.5 mLs by mouth every 4 hours.     • NICOTROL 10 MG inhaler USE UP TO 6 CARTRIDGES PER  each 1   • potassium CHLORIDE (KLOR-CON M) 20 MEQ ryan ER tablet TAKE 1 TABLET BY MOUTH DAILY 30 tablet 1   • bumetanide (BUMEX) 1 MG tablet TAKE 2 TABLETS BY MOUTH DAILY 60 tablet 0   • ALPRAZolam (XANAX) 0.25 MG tablet Take 1 tablet by mouth daily. 60 tablet 0   • budesonide-formoterol (SYMBICORT) 160-4.5 MCG/ACT inhaler Inhale 2 puffs into the lungs 2 times daily.     • tiotropium (SPIRIVA HANDIHALER) 18 MCG capsule for inhaler Place 1 capsule into the inhaler and take 2 puffs daily 30 capsule 2   • BABY ASPIRIN PO Take 81 mg by mouth daily. Do not start before December 26, 2019.     • nystatin (MYCOSTATIN) 523313 UNIT/GM powder Apply 1 application topically 2 times daily as needed (Fungal infection under breasts). Do not start before December 26, 2019.     • Acetaminophen (TYLENOL ARTHRITIS PAIN PO) Take 2 tablets by mouth every 4 hours as needed.     • PROAIR  (90 Base) MCG/ACT inhaler INHALE 1-2 PUFFS EVERY 4-6 HOURS AS NEEDED  (Patient taking differently: Inhale 2 puffs every 6 hours as needed for  wheezing) 8.5 g 1   • atorvastatin (LIPITOR) 10 MG tablet TAKE 1 TABLET BY MOUTH ONCE DAILY  90 tablet 2   • ipratropium (ATROVENT) 0.03 % nasal spray INSTILL 1 SQUIRT DAILY     • oxygen (O2) gas Inhale 3 L/min into the lungs continuous. Per nasal cannula, currently per mask with nasal packing to be removed by ENT 01/02/20.     • ipratropium (ATROVENT) 0.02 % nebulizer solution Take 2.5 mLs by nebulization 3 times daily as needed (Shortness of breath). 300 mL 6     No current facility-administered medications for this visit.      Medications reviewed / reconciled: Yes    BASELINE FUNCTIONAL STATUS:  Independent    CURRENT FUNCTIONAL STATUS:  Weight bearing as tolerated (WBAT)    DIET:  Consistency: General   Type: cardiac diet  Appetite: Good    REVIEW OF SYSTEMS: See HPI  Review of Systems    VITALS:  Vitals:    01/06/20 1502   BP: 110/67   Pulse: 81   Resp: 20   Temp: 96.7 °F (35.9 °C)   SpO2: 97%   Weight: (!) 136.8 kg (301 lb 9.6 oz)   PainSc: 3-4   PainLoc: Back       PHYSICAL ASSESSMENT:  Physical Exam  Vitals signs reviewed.   Constitutional:       General: She is not in acute distress.     Appearance: Normal appearance. She is obese. She is not ill-appearing, toxic-appearing or diaphoretic.   HENT:      Head: Atraumatic.      Nose: No rhinorrhea.      Comments: Dry old blood in right nasal passage     Mouth/Throat:      Mouth: Mucous membranes are moist.      Pharynx: Oropharynx is clear.   Eyes:      General: No scleral icterus.        Right eye: No discharge.         Left eye: No discharge.      Extraocular Movements: Extraocular movements intact.   Neck:      Musculoskeletal: Neck supple.   Cardiovascular:      Rate and Rhythm: Normal rate and regular rhythm.   Pulmonary:      Effort: Pulmonary effort is normal. No respiratory distress.      Breath sounds: Normal breath sounds. No stridor. No wheezing.   Abdominal:      General: Bowel sounds are normal. There is no distension.      Palpations: Abdomen is  soft.      Tenderness: There is no tenderness. There is no guarding.   Musculoskeletal:      Right lower leg: Edema (1+ edema) present.      Left lower leg: Edema (1+ edema) present.   Skin:     General: Skin is dry.      Capillary Refill: Capillary refill takes less than 2 seconds.   Neurological:      General: No focal deficit present.      Mental Status: She is alert and oriented to person, place, and time. Mental status is at baseline.   Psychiatric:         Mood and Affect: Mood normal.         Behavior: Behavior normal.         Thought Content: Thought content normal.         Judgment: Judgment normal.         LABS:    1/6/2020: Magnesium 2.2 phosphorus 4.3 WBC 6.87 hemoglobin 10.8 platelet 266 BUN 25 creatinine 1.57 sodium 145 potassium 4.3      CBC:   WBC (K/mcL)   Date Value   01/03/2020 8.7     RBC (mil/mcL)   Date Value   01/03/2020 4.09     HGB (g/dL)   Date Value   01/03/2020 12.1     PLT (K/mcL)   Date Value   01/03/2020 316    and BMP:   Sodium (mmol/L)   Date Value   01/03/2020 139     Potassium (mmol/L)   Date Value   01/03/2020 3.7     Chloride (mmol/L)   Date Value   01/03/2020 104     Glucose (mg/dL)   Date Value   01/03/2020 122 (H)     CALCIUM (mg/dL)   Date Value   01/03/2020 9.1     Carbon Dioxide (mmol/L)   Date Value   01/03/2020 31     BUN (mg/dL)   Date Value   01/03/2020 29 (H)     Creatinine (mg/dL)   Date Value   01/03/2020 1.82 (H)       ASSESSMENT AND PLAN  Assessment   No problem-specific Assessment & Plan notes found for this encounter.    Epistaxis-appears resolved, patient still reports some streaky blood with cough.  Hemoglobin 10.8 today from 12 in the hospital, will repeat stat H&H.  Continue [place] nasal spray, follow-up with ENT      AAydee griffith-continue low-dose aspirin, Eliquis on hold due to recent epistaxis.  If hemoglobin stable and no more bleeding, consider resuming Eliquis.  Patient does not want to resume Eliquis at this time, only feels safe to be on aspirin.  She is  aware of her risk of possible stroke.  She states she also discussed this with her PCP during hospitalization.  We will continue to readdress with patient.    Insomnia-continue alprazolam at bedtime    COPD-continue inhalers, stable no S OB/wheezing on exam    Hypertensive heart disease with  HFpEF: 12/2019 ECHO EF 62%  - continue Coreg, Bumex.  Monitor daily weights, monitor BP    CKD stage III-baseline creatinine 1.3-1.5.  Monitor renal function, avoid nephrotoxins if possible.    Renal function improving.  Repeat BMP Thursday    Debility-PT/OT, fall precautions  1/6/2020: Ambulates 60 feet x 3 with rolling walker min assist, transfers min assist, bed mobility min to contact-guard assist, upper body ADLs mod I lower body ADLs min assist, toilet transfer contact-guard assist, eating standby assist    Chronic back pain-continue PT/OT, PRN Tylenol, and lidocaine patch.  Patient requesting a recliner which helps with her back pain, discussed with nursing    Morbid obesity-BMI 53, encourage weight loss, follow-up with PCP    DVT prophylaxis: SCDs, patient at this this time does not want any anticoagulation.  Only agreeable to aspirin see above    FOLLOW UP APPOINTMENTS:  Future Appointments   Date Time Provider Department Center   1/15/2020  3:40 PM Christina Coyle DO [id number] [facility]   2/7/2020  3:00 PM Adolfo Mclain MD [id number] [facility]   6/5/2020 10:40 AM Stella Alvarez MD [id number] [facility]   Follow-up with Dr. Bell in 1 to 2 weeks (ENT)  Follow-up with PCP 1 week after discharge from SNF    DISCHARGE PLANNING: Patient wishes to discharge home with home health PT/OT/RN once rehab is complete  Anticipate discharge on 1/10/2020  Prognosis: good    Discussed with: RN / Nursing, Patient, SW, PT and Reviewed old records    Barriers to discharge: therapy needs    Anticipated disposition: Home With Home Health    Total time spent is more than 58 minutes, with more than 50% of the time spent in  coordination of care, counseling, review of records and discussion of plan of care with the patient /staff /family.    HOME HEALTH FACE TO FACE DOCUMENTATION AND CERTIFICATION  CMS Rules: Certifying Patients for the Medicare Home Health Benefit    Home Health Eligibility Summary    Name: Gracie Haynes  : 1946  MRN: [id number]    Service to Home Care order content:  There are no questions and answers to display.       I certify this patient is under my care and  I, or a nurse practitioner, clinical nurse specialist or physician assistant working with me, had a face-to-face encounter with this patient on the date listed.    Medical Condition Related to Home Health Services  The encounter with the patient was in whole, or in part, for the following medical condition(s), which are the reasons for home health care:   1. Epistaxis    2. Paroxysmal atrial fibrillation (CMS/[facility])    3. Chronic obstructive pulmonary disease, unspecified COPD type (CMS/[facility])    4. Hypertensive heart disease with congestive heart failure, unspecified heart failure type (CMS/[facility])    5. Other insomnia    6. Morbid obesity with BMI of 50.0-59.9, adult (CMS/[facility])    7. Chronic low back pain, unspecified back pain laterality, unspecified whether sciatica present           Certification of Medical Necessity(Certification is required for select Home Health beneficiaries only)  I certify based on my clinical findings, the services ordered in the referral are medically necessary home health services.    Homebound Status and Living Situation  I certify my clinical findings support this patient is homebound due to the homebound reason(s) listed in the Service to Home Care order content link above.  If reason for homebound status is not specified, this document does not certify the patient as homebound.    I have authorized these skilled home health services and the \"Physician to follow\" listed above will accept and assume care for this patient and  sign the Home Health Plan of Care.    Date of completion of form: 1/6/2020    Arsen Woods CNP           Heterosexual

## 2024-05-19 NOTE — ED PROVIDER NOTE - OBJECTIVE STATEMENT
54 yo female with a pmh of htn, chf, pe, dvt, copd, lupus presents c/o SOB and cough for several days. pt states to have been seen in urgent care on Friday and started on amoxicillin for pneumonia. pt states her SOB has worsened and noted to be hypoxic at home. pt denies any other symptoms including fevers, headache, recent illness/travel, abdominal pain, chest pain.

## 2024-05-19 NOTE — ED PROVIDER NOTE - ATTENDING APP SHARED VISIT CONTRIBUTION OF CARE
52 yr old f w/ a pmh significant for kidney stones, SLE lupus, YOON, RA, cervical cancer s/p hysterectomy, laryngeal cancer, CHF s/p AICD, DVT/PE on coumadin, NSTEMI, HTN, HLD, PPM/AICD who presents with sob. Pt states that last week she was ddx for PNA, placed on Augmentin. However, pt states that she continues to have SOB and noticed that at home she was hypoxic in the upper 80s. Pt also states that she has been having chest pain. Pt also endorses fevers, however, denies any nausea, vomiting or any other medical complaints.     VITAL SIGNS: I have reviewed nursing notes and confirm.  CONSTITUTIONAL: non-toxic, well appearing  SKIN: no rash, no petechiae.  EYES: EOMI, pink conjunctiva, anicteric  ENT: tongue midline, no exudates, MMM  NECK: Supple; no meningismus, no JVD  CARD: RRR, no murmurs, equal radial pulses bilaterally 2+  RESP: tacypneic while speaking, no respiratory distress  ABD: Soft, non-tender, non-distended, no peritoneal signs, no HSM, no CVA tenderness    52 yr old f that presents with worsening sob, concern for PNA vs PE. labs, ekg, imaging. reassess. dispo pending.

## 2024-05-19 NOTE — ED PROVIDER NOTE - CLINICAL SUMMARY MEDICAL DECISION MAKING FREE TEXT BOX
52 yr old f that presents with worsening sob, concern for PNA vs PE. labs, ekg, imaging. concern for pna on imaging. iv antibiotics. Labs and EKG were ordered and reviewed.  Imaging was ordered and reviewed by me.  Appropriate medications for patient's presenting complaints were ordered and effects were reassessed.  Patient's records (prior hospital, ED visit, and/or nursing home notes if available) were reviewed.  Additional history was obtained from EMS, family, and/or PCP (where available).  Escalation to admission/observation was considered.  Patient requires inpatient hospitalization - medicine.

## 2024-05-19 NOTE — ED PROVIDER NOTE - NSICDXPASTMEDICALHX_GEN_ALL_CORE_FT
PAST MEDICAL HISTORY:  APS (antiphospholipid syndrome)     Cervical cancer     CHF (congestive heart failure)     COPD (chronic obstructive pulmonary disease)     DVT (deep venous thrombosis)     GERD (gastroesophageal reflux disease)     History of laryngeal cancer     HTN (hypertension)     Lupus     Pulmonary embolism     RA (rheumatoid arthritis)

## 2024-05-20 DIAGNOSIS — J18.9 PNEUMONIA, UNSPECIFIED ORGANISM: ICD-10-CM

## 2024-05-20 LAB
ALBUMIN SERPL ELPH-MCNC: 3.6 G/DL — SIGNIFICANT CHANGE UP (ref 3.5–5.2)
ALP SERPL-CCNC: 79 U/L — SIGNIFICANT CHANGE UP (ref 30–115)
ALT FLD-CCNC: 9 U/L — SIGNIFICANT CHANGE UP (ref 0–41)
ANION GAP SERPL CALC-SCNC: 9 MMOL/L — SIGNIFICANT CHANGE UP (ref 7–14)
ANISOCYTOSIS BLD QL: SLIGHT — SIGNIFICANT CHANGE UP
APTT BLD: 54.7 SEC — HIGH (ref 27–39.2)
AST SERPL-CCNC: 7 U/L — SIGNIFICANT CHANGE UP (ref 0–41)
BASOPHILS # BLD AUTO: 0 K/UL — SIGNIFICANT CHANGE UP (ref 0–0.2)
BASOPHILS NFR BLD AUTO: 0 % — SIGNIFICANT CHANGE UP (ref 0–1)
BILIRUB SERPL-MCNC: 0.2 MG/DL — SIGNIFICANT CHANGE UP (ref 0.2–1.2)
BUN SERPL-MCNC: 11 MG/DL — SIGNIFICANT CHANGE UP (ref 10–20)
CALCIUM SERPL-MCNC: 9.3 MG/DL — SIGNIFICANT CHANGE UP (ref 8.4–10.4)
CHLORIDE SERPL-SCNC: 100 MMOL/L — SIGNIFICANT CHANGE UP (ref 98–110)
CO2 SERPL-SCNC: 28 MMOL/L — SIGNIFICANT CHANGE UP (ref 17–32)
CREAT SERPL-MCNC: 1 MG/DL — SIGNIFICANT CHANGE UP (ref 0.7–1.5)
CRP SERPL-MCNC: 87.5 MG/L — HIGH
EGFR: 67 ML/MIN/1.73M2 — SIGNIFICANT CHANGE UP
EOSINOPHIL # BLD AUTO: 0 K/UL — SIGNIFICANT CHANGE UP (ref 0–0.7)
EOSINOPHIL NFR BLD AUTO: 0 % — SIGNIFICANT CHANGE UP (ref 0–8)
ERYTHROCYTE [SEDIMENTATION RATE] IN BLOOD: 71 MM/HR — HIGH (ref 0–20)
GLUCOSE SERPL-MCNC: 100 MG/DL — HIGH (ref 70–99)
HCT VFR BLD CALC: 34.8 % — LOW (ref 37–47)
HGB BLD-MCNC: 11.5 G/DL — LOW (ref 12–16)
INR BLD: 1.02 RATIO — SIGNIFICANT CHANGE UP (ref 0.65–1.3)
LYMPHOCYTES # BLD AUTO: 3.2 K/UL — SIGNIFICANT CHANGE UP (ref 1.2–3.4)
LYMPHOCYTES # BLD AUTO: 30.4 % — SIGNIFICANT CHANGE UP (ref 20.5–51.1)
MAGNESIUM SERPL-MCNC: 1.7 MG/DL — LOW (ref 1.8–2.4)
MANUAL SMEAR VERIFICATION: SIGNIFICANT CHANGE UP
MCHC RBC-ENTMCNC: 29.9 PG — SIGNIFICANT CHANGE UP (ref 27–31)
MCHC RBC-ENTMCNC: 33 G/DL — SIGNIFICANT CHANGE UP (ref 32–37)
MCV RBC AUTO: 90.6 FL — SIGNIFICANT CHANGE UP (ref 81–99)
MICROCYTES BLD QL: SLIGHT — SIGNIFICANT CHANGE UP
MONOCYTES # BLD AUTO: 0.64 K/UL — HIGH (ref 0.1–0.6)
MONOCYTES NFR BLD AUTO: 6.1 % — SIGNIFICANT CHANGE UP (ref 1.7–9.3)
NEUTROPHILS # BLD AUTO: 6.6 K/UL — HIGH (ref 1.4–6.5)
NEUTROPHILS NFR BLD AUTO: 62.6 % — SIGNIFICANT CHANGE UP (ref 42.2–75.2)
OVALOCYTES BLD QL SMEAR: SLIGHT — SIGNIFICANT CHANGE UP
PLAT MORPH BLD: NORMAL — SIGNIFICANT CHANGE UP
PLATELET # BLD AUTO: 255 K/UL — SIGNIFICANT CHANGE UP (ref 130–400)
PMV BLD: 11.8 FL — HIGH (ref 7.4–10.4)
POIKILOCYTOSIS BLD QL AUTO: SLIGHT — SIGNIFICANT CHANGE UP
POLYCHROMASIA BLD QL SMEAR: SLIGHT — SIGNIFICANT CHANGE UP
POTASSIUM SERPL-MCNC: 3.8 MMOL/L — SIGNIFICANT CHANGE UP (ref 3.5–5)
POTASSIUM SERPL-SCNC: 3.8 MMOL/L — SIGNIFICANT CHANGE UP (ref 3.5–5)
PROCALCITONIN SERPL-MCNC: 0.06 NG/ML — SIGNIFICANT CHANGE UP (ref 0.02–0.1)
PROT SERPL-MCNC: 6.3 G/DL — SIGNIFICANT CHANGE UP (ref 6–8)
PROTHROM AB SERPL-ACNC: 11.6 SEC — SIGNIFICANT CHANGE UP (ref 9.95–12.87)
RBC # BLD: 3.84 M/UL — LOW (ref 4.2–5.4)
RBC # FLD: 13.7 % — SIGNIFICANT CHANGE UP (ref 11.5–14.5)
RBC BLD AUTO: ABNORMAL
SODIUM SERPL-SCNC: 137 MMOL/L — SIGNIFICANT CHANGE UP (ref 135–146)
VARIANT LYMPHS # BLD: 0.9 % — SIGNIFICANT CHANGE UP (ref 0–5)
WBC # BLD: 10.54 K/UL — SIGNIFICANT CHANGE UP (ref 4.8–10.8)
WBC # FLD AUTO: 10.54 K/UL — SIGNIFICANT CHANGE UP (ref 4.8–10.8)

## 2024-05-20 PROCEDURE — 86036 ANCA SCREEN EACH ANTIBODY: CPT

## 2024-05-20 PROCEDURE — 86663 EPSTEIN-BARR ANTIBODY: CPT

## 2024-05-20 PROCEDURE — 87046 STOOL CULTR AEROBIC BACT EA: CPT

## 2024-05-20 PROCEDURE — 85732 THROMBOPLASTIN TIME PARTIAL: CPT

## 2024-05-20 PROCEDURE — 82550 ASSAY OF CK (CPK): CPT

## 2024-05-20 PROCEDURE — 86665 EPSTEIN-BARR CAPSID VCA: CPT

## 2024-05-20 PROCEDURE — 85613 RUSSELL VIPER VENOM DILUTED: CPT

## 2024-05-20 PROCEDURE — 84443 ASSAY THYROID STIM HORMONE: CPT

## 2024-05-20 PROCEDURE — 85730 THROMBOPLASTIN TIME PARTIAL: CPT

## 2024-05-20 PROCEDURE — 86658 ENTEROVIRUS ANTIBODY: CPT

## 2024-05-20 PROCEDURE — 94640 AIRWAY INHALATION TREATMENT: CPT

## 2024-05-20 PROCEDURE — 87340 HEPATITIS B SURFACE AG IA: CPT

## 2024-05-20 PROCEDURE — 80053 COMPREHEN METABOLIC PANEL: CPT

## 2024-05-20 PROCEDURE — 86146 BETA-2 GLYCOPROTEIN ANTIBODY: CPT

## 2024-05-20 PROCEDURE — 85025 COMPLETE CBC W/AUTO DIFF WBC: CPT

## 2024-05-20 PROCEDURE — 99223 1ST HOSP IP/OBS HIGH 75: CPT

## 2024-05-20 PROCEDURE — 83516 IMMUNOASSAY NONANTIBODY: CPT

## 2024-05-20 PROCEDURE — 86645 CMV ANTIBODY IGM: CPT

## 2024-05-20 PROCEDURE — 86038 ANTINUCLEAR ANTIBODIES: CPT

## 2024-05-20 PROCEDURE — 85652 RBC SED RATE AUTOMATED: CPT

## 2024-05-20 PROCEDURE — 86431 RHEUMATOID FACTOR QUANT: CPT

## 2024-05-20 PROCEDURE — 71045 X-RAY EXAM CHEST 1 VIEW: CPT

## 2024-05-20 PROCEDURE — 87389 HIV-1 AG W/HIV-1&-2 AB AG IA: CPT

## 2024-05-20 PROCEDURE — 83735 ASSAY OF MAGNESIUM: CPT

## 2024-05-20 PROCEDURE — 84145 PROCALCITONIN (PCT): CPT

## 2024-05-20 PROCEDURE — 86235 NUCLEAR ANTIGEN ANTIBODY: CPT

## 2024-05-20 PROCEDURE — 87899 AGENT NOS ASSAY W/OPTIC: CPT

## 2024-05-20 PROCEDURE — 86255 FLUORESCENT ANTIBODY SCREEN: CPT

## 2024-05-20 PROCEDURE — 93307 TTE W/O DOPPLER COMPLETE: CPT

## 2024-05-20 PROCEDURE — 86780 TREPONEMA PALLIDUM: CPT

## 2024-05-20 PROCEDURE — 87507 IADNA-DNA/RNA PROBE TQ 12-25: CPT

## 2024-05-20 PROCEDURE — 86747 PARVOVIRUS ANTIBODY: CPT

## 2024-05-20 PROCEDURE — 87641 MR-STAPH DNA AMP PROBE: CPT

## 2024-05-20 PROCEDURE — 36415 COLL VENOUS BLD VENIPUNCTURE: CPT

## 2024-05-20 PROCEDURE — 86140 C-REACTIVE PROTEIN: CPT

## 2024-05-20 PROCEDURE — 86704 HEP B CORE ANTIBODY TOTAL: CPT

## 2024-05-20 PROCEDURE — 85610 PROTHROMBIN TIME: CPT

## 2024-05-20 PROCEDURE — 86200 CCP ANTIBODY: CPT

## 2024-05-20 PROCEDURE — 86160 COMPLEMENT ANTIGEN: CPT

## 2024-05-20 PROCEDURE — 87449 NOS EACH ORGANISM AG IA: CPT

## 2024-05-20 PROCEDURE — 86664 EPSTEIN-BARR NUCLEAR ANTIGEN: CPT

## 2024-05-20 PROCEDURE — 86225 DNA ANTIBODY NATIVE: CPT

## 2024-05-20 PROCEDURE — 99222 1ST HOSP IP/OBS MODERATE 55: CPT

## 2024-05-20 PROCEDURE — 86147 CARDIOLIPIN ANTIBODY EA IG: CPT

## 2024-05-20 PROCEDURE — 87045 FECES CULTURE AEROBIC BACT: CPT

## 2024-05-20 PROCEDURE — 86706 HEP B SURFACE ANTIBODY: CPT

## 2024-05-20 PROCEDURE — 80048 BASIC METABOLIC PNL TOTAL CA: CPT

## 2024-05-20 PROCEDURE — 87640 STAPH A DNA AMP PROBE: CPT

## 2024-05-20 PROCEDURE — 86480 TB TEST CELL IMMUN MEASURE: CPT

## 2024-05-20 PROCEDURE — 71045 X-RAY EXAM CHEST 1 VIEW: CPT | Mod: 26

## 2024-05-20 RX ORDER — PANTOPRAZOLE SODIUM 20 MG/1
40 TABLET, DELAYED RELEASE ORAL
Refills: 0 | Status: DISCONTINUED | OUTPATIENT
Start: 2024-05-20 | End: 2024-05-26

## 2024-05-20 RX ORDER — DIPHENHYDRAMINE HCL 50 MG
50 CAPSULE ORAL ONCE
Refills: 0 | Status: DISCONTINUED | OUTPATIENT
Start: 2024-05-20 | End: 2024-05-26

## 2024-05-20 RX ORDER — ALBUTEROL 90 UG/1
2 AEROSOL, METERED ORAL EVERY 6 HOURS
Refills: 0 | Status: DISCONTINUED | OUTPATIENT
Start: 2024-05-20 | End: 2024-05-26

## 2024-05-20 RX ORDER — COLCHICINE 0.6 MG
0.6 TABLET ORAL EVERY 12 HOURS
Refills: 0 | Status: DISCONTINUED | OUTPATIENT
Start: 2024-05-21 | End: 2024-05-26

## 2024-05-20 RX ORDER — MORPHINE SULFATE 50 MG/1
60 CAPSULE, EXTENDED RELEASE ORAL
Refills: 0 | Status: DISCONTINUED | OUTPATIENT
Start: 2024-05-20 | End: 2024-05-21

## 2024-05-20 RX ORDER — PIPERACILLIN AND TAZOBACTAM 4; .5 G/20ML; G/20ML
3.38 INJECTION, POWDER, LYOPHILIZED, FOR SOLUTION INTRAVENOUS ONCE
Refills: 0 | Status: COMPLETED | OUTPATIENT
Start: 2024-05-20 | End: 2024-05-20

## 2024-05-20 RX ORDER — CHOLECALCIFEROL (VITAMIN D3) 125 MCG
2000 CAPSULE ORAL DAILY
Refills: 0 | Status: DISCONTINUED | OUTPATIENT
Start: 2024-05-20 | End: 2024-05-26

## 2024-05-20 RX ORDER — METOPROLOL TARTRATE 50 MG
100 TABLET ORAL DAILY
Refills: 0 | Status: DISCONTINUED | OUTPATIENT
Start: 2024-05-20 | End: 2024-05-26

## 2024-05-20 RX ORDER — MAGNESIUM SULFATE 500 MG/ML
1 VIAL (ML) INJECTION ONCE
Refills: 0 | Status: COMPLETED | OUTPATIENT
Start: 2024-05-20 | End: 2024-05-20

## 2024-05-20 RX ORDER — AMPICILLIN SODIUM AND SULBACTAM SODIUM 250; 125 MG/ML; MG/ML
3 INJECTION, POWDER, FOR SUSPENSION INTRAMUSCULAR; INTRAVENOUS ONCE
Refills: 0 | Status: COMPLETED | OUTPATIENT
Start: 2024-05-20 | End: 2024-05-20

## 2024-05-20 RX ORDER — TIOTROPIUM BROMIDE 18 UG/1
2 CAPSULE ORAL; RESPIRATORY (INHALATION) DAILY
Refills: 0 | Status: DISCONTINUED | OUTPATIENT
Start: 2024-05-20 | End: 2024-05-26

## 2024-05-20 RX ORDER — FLUTICASONE FUROATE, UMECLIDINIUM BROMIDE AND VILANTEROL TRIFENATATE 200; 62.5; 25 UG/1; UG/1; UG/1
1 POWDER RESPIRATORY (INHALATION)
Refills: 0 | DISCHARGE

## 2024-05-20 RX ORDER — ENOXAPARIN SODIUM 100 MG/ML
110 INJECTION SUBCUTANEOUS EVERY 24 HOURS
Refills: 0 | Status: DISCONTINUED | OUTPATIENT
Start: 2024-05-20 | End: 2024-05-26

## 2024-05-20 RX ORDER — BUDESONIDE AND FORMOTEROL FUMARATE DIHYDRATE 160; 4.5 UG/1; UG/1
2 AEROSOL RESPIRATORY (INHALATION)
Refills: 0 | Status: DISCONTINUED | OUTPATIENT
Start: 2024-05-20 | End: 2024-05-26

## 2024-05-20 RX ORDER — IBUPROFEN 200 MG
600 TABLET ORAL EVERY 12 HOURS
Refills: 0 | Status: DISCONTINUED | OUTPATIENT
Start: 2024-05-20 | End: 2024-05-20

## 2024-05-20 RX ORDER — LANOLIN ALCOHOL/MO/W.PET/CERES
3 CREAM (GRAM) TOPICAL AT BEDTIME
Refills: 0 | Status: DISCONTINUED | OUTPATIENT
Start: 2024-05-20 | End: 2024-05-26

## 2024-05-20 RX ORDER — COLCHICINE 0.6 MG
1.2 TABLET ORAL ONCE
Refills: 0 | Status: COMPLETED | OUTPATIENT
Start: 2024-05-20 | End: 2024-05-20

## 2024-05-20 RX ORDER — AMPICILLIN SODIUM AND SULBACTAM SODIUM 250; 125 MG/ML; MG/ML
3 INJECTION, POWDER, FOR SUSPENSION INTRAMUSCULAR; INTRAVENOUS EVERY 6 HOURS
Refills: 0 | Status: DISCONTINUED | OUTPATIENT
Start: 2024-05-20 | End: 2024-05-25

## 2024-05-20 RX ORDER — MORPHINE SULFATE 50 MG/1
30 CAPSULE, EXTENDED RELEASE ORAL
Refills: 0 | Status: DISCONTINUED | OUTPATIENT
Start: 2024-05-20 | End: 2024-05-21

## 2024-05-20 RX ORDER — POTASSIUM CHLORIDE 20 MEQ
40 PACKET (EA) ORAL ONCE
Refills: 0 | Status: COMPLETED | OUTPATIENT
Start: 2024-05-20 | End: 2024-05-20

## 2024-05-20 RX ORDER — ONDANSETRON 8 MG/1
4 TABLET, FILM COATED ORAL EVERY 8 HOURS
Refills: 0 | Status: DISCONTINUED | OUTPATIENT
Start: 2024-05-20 | End: 2024-05-26

## 2024-05-20 RX ORDER — FOLIC ACID 0.8 MG
1 TABLET ORAL DAILY
Refills: 0 | Status: DISCONTINUED | OUTPATIENT
Start: 2024-05-20 | End: 2024-05-26

## 2024-05-20 RX ORDER — ACETAMINOPHEN 500 MG
650 TABLET ORAL EVERY 6 HOURS
Refills: 0 | Status: DISCONTINUED | OUTPATIENT
Start: 2024-05-20 | End: 2024-05-26

## 2024-05-20 RX ORDER — MORPHINE SULFATE 50 MG/1
15 CAPSULE, EXTENDED RELEASE ORAL
Refills: 0 | Status: DISCONTINUED | OUTPATIENT
Start: 2024-05-20 | End: 2024-05-21

## 2024-05-20 RX ORDER — AMPICILLIN SODIUM AND SULBACTAM SODIUM 250; 125 MG/ML; MG/ML
INJECTION, POWDER, FOR SUSPENSION INTRAMUSCULAR; INTRAVENOUS
Refills: 0 | Status: DISCONTINUED | OUTPATIENT
Start: 2024-05-20 | End: 2024-05-25

## 2024-05-20 RX ORDER — IBUPROFEN 200 MG
600 TABLET ORAL EVERY 8 HOURS
Refills: 0 | Status: DISCONTINUED | OUTPATIENT
Start: 2024-05-20 | End: 2024-05-21

## 2024-05-20 RX ORDER — ALPRAZOLAM 0.25 MG
1 TABLET ORAL
Refills: 0 | Status: DISCONTINUED | OUTPATIENT
Start: 2024-05-20 | End: 2024-05-21

## 2024-05-20 RX ORDER — PIPERACILLIN AND TAZOBACTAM 4; .5 G/20ML; G/20ML
3.38 INJECTION, POWDER, LYOPHILIZED, FOR SOLUTION INTRAVENOUS EVERY 8 HOURS
Refills: 0 | Status: DISCONTINUED | OUTPATIENT
Start: 2024-05-20 | End: 2024-05-20

## 2024-05-20 RX ORDER — MORPHINE SULFATE 50 MG/1
4 CAPSULE, EXTENDED RELEASE ORAL ONCE
Refills: 0 | Status: DISCONTINUED | OUTPATIENT
Start: 2024-05-20 | End: 2024-05-20

## 2024-05-20 RX ADMIN — PIPERACILLIN AND TAZOBACTAM 200 GRAM(S): 4; .5 INJECTION, POWDER, LYOPHILIZED, FOR SOLUTION INTRAVENOUS at 05:27

## 2024-05-20 RX ADMIN — MORPHINE SULFATE 30 MILLIGRAM(S): 50 CAPSULE, EXTENDED RELEASE ORAL at 16:47

## 2024-05-20 RX ADMIN — Medication 100 MILLIGRAM(S): at 05:38

## 2024-05-20 RX ADMIN — MORPHINE SULFATE 4 MILLIGRAM(S): 50 CAPSULE, EXTENDED RELEASE ORAL at 00:33

## 2024-05-20 RX ADMIN — Medication 600 MILLIGRAM(S): at 17:02

## 2024-05-20 RX ADMIN — Medication 2000 UNIT(S): at 11:08

## 2024-05-20 RX ADMIN — Medication 100 MILLIGRAM(S): at 17:02

## 2024-05-20 RX ADMIN — Medication 40 MILLIEQUIVALENT(S): at 08:18

## 2024-05-20 RX ADMIN — Medication 100 GRAM(S): at 10:39

## 2024-05-20 RX ADMIN — AMPICILLIN SODIUM AND SULBACTAM SODIUM 200 GRAM(S): 250; 125 INJECTION, POWDER, FOR SUSPENSION INTRAMUSCULAR; INTRAVENOUS at 11:08

## 2024-05-20 RX ADMIN — MORPHINE SULFATE 15 MILLIGRAM(S): 50 CAPSULE, EXTENDED RELEASE ORAL at 05:57

## 2024-05-20 RX ADMIN — Medication 1.2 MILLIGRAM(S): at 18:12

## 2024-05-20 RX ADMIN — ENOXAPARIN SODIUM 110 MILLIGRAM(S): 100 INJECTION SUBCUTANEOUS at 17:06

## 2024-05-20 RX ADMIN — AMPICILLIN SODIUM AND SULBACTAM SODIUM 200 GRAM(S): 250; 125 INJECTION, POWDER, FOR SUSPENSION INTRAMUSCULAR; INTRAVENOUS at 23:07

## 2024-05-20 RX ADMIN — PIPERACILLIN AND TAZOBACTAM 25 GRAM(S): 4; .5 INJECTION, POWDER, LYOPHILIZED, FOR SOLUTION INTRAVENOUS at 07:12

## 2024-05-20 RX ADMIN — Medication 1 MILLIGRAM(S): at 22:23

## 2024-05-20 RX ADMIN — TIOTROPIUM BROMIDE 2 PUFF(S): 18 CAPSULE ORAL; RESPIRATORY (INHALATION) at 09:31

## 2024-05-20 RX ADMIN — Medication 1 MILLIGRAM(S): at 11:04

## 2024-05-20 RX ADMIN — Medication 100 MILLIGRAM(S): at 01:51

## 2024-05-20 RX ADMIN — BUDESONIDE AND FORMOTEROL FUMARATE DIHYDRATE 2 PUFF(S): 160; 4.5 AEROSOL RESPIRATORY (INHALATION) at 08:18

## 2024-05-20 RX ADMIN — Medication 100 MILLIGRAM(S): at 09:31

## 2024-05-20 RX ADMIN — MORPHINE SULFATE 30 MILLIGRAM(S): 50 CAPSULE, EXTENDED RELEASE ORAL at 13:04

## 2024-05-20 RX ADMIN — BUDESONIDE AND FORMOTEROL FUMARATE DIHYDRATE 2 PUFF(S): 160; 4.5 AEROSOL RESPIRATORY (INHALATION) at 22:23

## 2024-05-20 RX ADMIN — MORPHINE SULFATE 60 MILLIGRAM(S): 50 CAPSULE, EXTENDED RELEASE ORAL at 06:26

## 2024-05-20 RX ADMIN — MORPHINE SULFATE 30 MILLIGRAM(S): 50 CAPSULE, EXTENDED RELEASE ORAL at 22:23

## 2024-05-20 NOTE — CONSULT NOTE ADULT - SUBJECTIVE AND OBJECTIVE BOX
ILEANA MCNEIL  53y, Female  Allergy: Plaquenil (Other)  Avelox (Other)  Arava (Anaphylaxis; Angioedema)  adhesives (Rash; Urticaria)  Vantin (Other (Mild))      CHIEF COMPLAINT:   CAP (20 May 2024 03:44)      LOS      HPI  HPI:  Pt is a 52 y/o F PMHx  of HTN, HLD, COPD on home O2, SLE, lupus anticoagulant, antiphospholipid syndrome,  rheumatoid arthritis, cervical cancer sp hysterectomy,  laryngeal cancer s/p CT, RT, HFpEF (06/21/19 EF 63%) s/p AICD+PPM, DVT/PE (on lovenox QD),  NSTEMI in 2015, subclavian stenosis s/p stent placement presenting for fever and pleuritic chest pain. Pt reports on Wed she woke up with pleuritic chest pain and increased SOB, She reports she has required her home O2 use more frequently as well. Reports TMax 104 and went to urgent care on Friday where she was given Augmentin. When her symptoms did not improve she presented to the ED.    /81 HR 93 RR 19 T98.4F Sat 98% on 3L O2    CT CHEST PE New partial opacification of the left lower lobe central bronchi and lower lobe consolidation likely representing pneumonia/atelectasis. Recommend follow-up CT chest in 6/8 weeks to assess for resolution.    Mild to moderate pericardial effusion, new.    Admitted to medicine   (20 May 2024 03:44)      INFECTIOUS DISEASE HISTORY:  ID consulted for CAP  Afebrile     Was on PO augmentin as outpatient     CTA Right lower lobe segmental pulmonary embolism with calcifications with   similar appearance to chest CTA dated 6/24/2022 likely reflecting chronic   PE.  New partial opacification of the left lower lobe central bronchi and lower lobe consolidation likely representing pneumonia/atelectasis.   Recommend follow-up CT chest in 6/8 weeks to assess for resolution.  Mild to moderate pericardial effusion, new.      Currently ordered for:  ampicillin/sulbactam  IVPB      doxycycline IVPB      doxycycline IVPB 100 milliGRAM(s) IV Intermittent once  doxycycline IVPB 100 milliGRAM(s) IV Intermittent every 12 hours      PMH  PAST MEDICAL & SURGICAL HISTORY:  Lupus      RA (rheumatoid arthritis)      HTN (hypertension)      CHF (congestive heart failure)      COPD (chronic obstructive pulmonary disease)      DVT (deep venous thrombosis)      Pulmonary embolism      History of laryngeal cancer      GERD (gastroesophageal reflux disease)      Cervical cancer      APS (antiphospholipid syndrome)      S/P appendectomy      S/P cholecystectomy      AICD (automatic cardioverter/defibrillator) present  [employer]      S/P hysterectomy      History of back surgery      H/O foot surgery      S/P eye surgery      Subclavian arterial stenosis          FAMILY HISTORY  Family history of cervical cancer    FH: thyroid cancer        SOCIAL HISTORY  Social History:  Single, Lives At Home, Not Currently Occupied, Not Sexually Active (14 Jun 2023 20:18)        ROS  ***    VITALS:  T(F): 97.7, Max: 98.4 (05-19-24 @ 18:55)  HR: 72  BP: 126/77  RR: 18Vital Signs Last 24 Hrs  T(C): 36.5 (20 May 2024 07:37), Max: 36.9 (19 May 2024 18:55)  T(F): 97.7 (20 May 2024 07:37), Max: 98.4 (19 May 2024 18:55)  HR: 72 (20 May 2024 07:37) (71 - 93)  BP: 126/77 (20 May 2024 07:37) (110/58 - 139/81)  BP(mean): 92 (20 May 2024 07:37) (77 - 92)  RR: 18 (20 May 2024 07:37) (18 - 19)  SpO2: 98% (20 May 2024 07:37) (92% - 99%)    Parameters below as of 20 May 2024 05:38  Patient On (Oxygen Delivery Method): nasal cannula  O2 Flow (L/min): 2      PHYSICAL EXAM:  ***    TESTS & MEASUREMENTS:                        11.5   10.54 )-----------( 255      ( 20 May 2024 06:31 )             34.8     05-20    137  |  100  |  11  ----------------------------<  100<H>  3.8   |  28  |  1.0    Ca    9.3      20 May 2024 06:31  Mg     1.7     05-20    TPro  6.3  /  Alb  3.6  /  TBili  0.2  /  DBili  x   /  AST  7   /  ALT  9   /  AlkPhos  79  05-20      LIVER FUNCTIONS - ( 20 May 2024 06:31 )  Alb: 3.6 g/dL / Pro: 6.3 g/dL / ALK PHOS: 79 U/L / ALT: 9 U/L / AST: 7 U/L / GGT: x           Urinalysis Basic - ( 20 May 2024 06:31 )    Color: x / Appearance: x / SG: x / pH: x  Gluc: 100 mg/dL / Ketone: x  / Bili: x / Urobili: x   Blood: x / Protein: x / Nitrite: x   Leuk Esterase: x / RBC: x / WBC x   Sq Epi: x / Non Sq Epi: x / Bacteria: x        Culture - Urine (collected 06-14-23 @ 16:40)  Source: Clean Catch Clean Catch (Midstream)  Final Report (06-16-23 @ 19:36):    <10,000 CFU/mL Normal Urogenital Dariana        Blood Gas Venous - Lactate: 1.0 mmol/L (05-19-24 @ 20:21)      INFECTIOUS DISEASES TESTING      INFLAMMATORY MARKERS      RADIOLOGY & ADDITIONAL TESTS:  I have personally reviewed the last Chest xray  CXR      CT  CT Angio Chest PE Protocol w/ IV Cont:   ACC: [id number] EXAM:  CT ANGIO CHEST PULM ART [facility]   ORDERED BY: ANNIKA HUTCHISON     PROCEDURE DATE:  05/19/2024          INTERPRETATION:  CLINICAL INDICATION: Hypoxia.    TECHNIQUE:  CTA of the thorax was performed after administration of contrast per the   PE protocol. Sagittal and coronal reformats were performed as well as MIP   reconstructions.  Intravenous contrast: 100 cc Omnipaque 350    COMPARISON: CT chest 4/11/2024. CTA chest 6/24/2022.    INTERPRETATION:    PULMONARY ARTERIES: Right lower lobe segmental pulmonary embolism with   calcifications which were seen on prior CT chest dated 4/11/2024 likely   reflecting chronic PE.    AIRWAYS/LUNGS/PLEURA: New partial opacification of the left lower lobe   central bronchi  Left lower lobe consolidative opacity with air   bronchograms and adjacent effusion, new. There is no pneumothorax.    MEDIASTINUM: Nonspecific subcentimeter mediastinal lymph nodes likely   reactive    HEART AND VESSELS: The heart is normal in size. There is no evidence of   right heart strain. Cardiac pacing device in the left chest.  Mild to   moderate pericardial effusion.  Left subclavian stent.    UPPER ABDOMEN: Cholecystectomy.    BONES AND SOFT TISSUES: No acute osseous abnormalities.  Collateral   vessel formation    IMPRESSION:    Right lower lobe segmental pulmonary embolism with calcifications with   similar appearance to chest CTA dated 6/24/2022 likely reflecting chronic   PE.    New partial opacification of the left lower lobe central bronchi and   lower lobe consolidation likely representing pneumonia/atelectasis.   Recommend follow-up CT chest in 6/8 weeks to assess for resolution.    Mild to moderate pericardial effusion, new.    Communication: The summary of above findings were discussed with readback   confirmation with Dr. Loya by radiology resident Rodolfo Ogden on   5/19/2024 at 10:50 PM.    --- End of Report ---          RODOLFO OGDEN MD; Resident Radiologist  This document has been electronically signed.  PARVEZ PANG; Attending Radiologist  This document has been electronically signed. May 19 2024 11:48PM (05-19-24 @ 21:09)      CARDIOLOGY TESTING      MEDICATIONS  ampicillin/sulbactam  IVPB     budesonide  80 MICROgram(s)/formoterol 4.5 MICROgram(s) Inhaler 2 Inhalation two times a day  cholecalciferol 2000 Oral daily  doxycycline IVPB     doxycycline IVPB 100 IV Intermittent once  doxycycline IVPB 100 IV Intermittent every 12 hours  enoxaparin Injectable 110 SubCutaneous every 24 hours  folic acid 1 Oral daily  guaiFENesin  Oral every 12 hours  metoprolol succinate  Oral daily  morphine ER Tablet 60 Oral <User Schedule>  tiotropium 2.5 MICROgram(s) Inhaler 2 Inhalation daily        ANTIBIOTICS:  ampicillin/sulbactam  IVPB      doxycycline IVPB      doxycycline IVPB 100 milliGRAM(s) IV Intermittent once  doxycycline IVPB 100 milliGRAM(s) IV Intermittent every 12 hours      ALLERGIES:  Plaquenil (Other)  Avelox (Other)  Arava (Anaphylaxis; Angioedema)  adhesives (Rash; Urticaria)  Vantin (Other (Mild))           ILEANA MCNEIL  53y, Female  Allergy: Plaquenil (Other)  Avelox (Other)  Arava (Anaphylaxis; Angioedema)  adhesives (Rash; Urticaria)  Vantin (Other (Mild))      CHIEF COMPLAINT:   CAP (20 May 2024 03:44)      LOS      HPI  HPI:  Pt is a 54 y/o F PMHx  of HTN, HLD, COPD on home O2, SLE, lupus anticoagulant, antiphospholipid syndrome,  rheumatoid arthritis, cervical cancer sp hysterectomy,  laryngeal cancer s/p CT, RT, HFpEF (06/21/19 EF 63%) s/p AICD+PPM, DVT/PE (on lovenox QD),  NSTEMI in 2015, subclavian stenosis s/p stent placement presenting for fever and pleuritic chest pain. Pt reports on Wed she woke up with pleuritic chest pain and increased SOB, She reports she has required her home O2 use more frequently as well. Reports TMax 104 and went to urgent care on Friday where she was given Augmentin. When her symptoms did not improve she presented to the ED.    /81 HR 93 RR 19 T98.4F Sat 98% on 3L O2    CT CHEST PE New partial opacification of the left lower lobe central bronchi and lower lobe consolidation likely representing pneumonia/atelectasis. Recommend follow-up CT chest in 6/8 weeks to assess for resolution.    Mild to moderate pericardial effusion, new.    Admitted to medicine   (20 May 2024 03:44)      INFECTIOUS DISEASE HISTORY:  ID consulted for CAP  Reports fever at home and pleuritic CP   Non-productive cough, no rhinorrhea ,no sore throat  No sick contacts, no travel  not on immunosuppressives  +vomiting/diarrhea on Wednesday     Afebrile     Was on PO augmentin as outpatient     CTA Right lower lobe segmental pulmonary embolism with calcifications with   similar appearance to chest CTA dated 6/24/2022 likely reflecting chronic   PE.  New partial opacification of the left lower lobe central bronchi and lower lobe consolidation likely representing pneumonia/atelectasis.   Recommend follow-up CT chest in 6/8 weeks to assess for resolution.  Mild to moderate pericardial effusion, new.      Currently ordered for:  ampicillin/sulbactam  IVPB      doxycycline IVPB      doxycycline IVPB 100 milliGRAM(s) IV Intermittent once  doxycycline IVPB 100 milliGRAM(s) IV Intermittent every 12 hours      PMH  PAST MEDICAL & SURGICAL HISTORY:  Lupus      RA (rheumatoid arthritis)      HTN (hypertension)      CHF (congestive heart failure)      COPD (chronic obstructive pulmonary disease)      DVT (deep venous thrombosis)      Pulmonary embolism      History of laryngeal cancer      GERD (gastroesophageal reflux disease)      Cervical cancer      APS (antiphospholipid syndrome)      S/P appendectomy      S/P cholecystectomy      AICD (automatic cardioverter/defibrillator) present  [employer]      S/P hysterectomy      History of back surgery      H/O foot surgery      S/P eye surgery      Subclavian arterial stenosis          FAMILY HISTORY  Family history of cervical cancer    FH: thyroid cancer        SOCIAL HISTORY  Social History:  Single, Lives At Home, Not Currently Occupied, Not Sexually Active (14 Jun 2023 20:18)        ROS  General: Denies rigors, nightsweats  HEENT: Denies headache, rhinorrhea, sore throat, eye pain  CV: as noted above   PULM: as noted above   GI: Denies hematemesis, hematochezia, melena  : Denies discharge, hematuria  MSK: Denies arthralgias, myalgias  SKIN: Denies rash, lesions  NEURO: Denies paresthesias, weakness  PSYCH: Denies depression, anxiety     VITALS:  T(F): 97.7, Max: 98.4 (05-19-24 @ 18:55)  HR: 72  BP: 126/77  RR: 18Vital Signs Last 24 Hrs  T(C): 36.5 (20 May 2024 07:37), Max: 36.9 (19 May 2024 18:55)  T(F): 97.7 (20 May 2024 07:37), Max: 98.4 (19 May 2024 18:55)  HR: 72 (20 May 2024 07:37) (71 - 93)  BP: 126/77 (20 May 2024 07:37) (110/58 - 139/81)  BP(mean): 92 (20 May 2024 07:37) (77 - 92)  RR: 18 (20 May 2024 07:37) (18 - 19)  SpO2: 98% (20 May 2024 07:37) (92% - 99%)    Parameters below as of 20 May 2024 05:38  Patient On (Oxygen Delivery Method): nasal cannula  O2 Flow (L/min): 2      PHYSICAL EXAM:  Gen: NAD, resting in bed NC  HEENT: Normocephalic, atraumatic  Neck: supple, no lymphadenopathy  CV: Regular rate & regular rhythm  Lungs: decreased BS at bases, no fremitus  Abdomen: Soft, BS present  Ext: Warm, well perfused  Neuro: non focal, awake  Skin: no rash, no erythema  RLE ecchymoses, hard nodule  Lines: no phlebitis     TESTS & MEASUREMENTS:                        11.5   10.54 )-----------( 255      ( 20 May 2024 06:31 )             34.8     05-20    137  |  100  |  11  ----------------------------<  100<H>  3.8   |  28  |  1.0    Ca    9.3      20 May 2024 06:31  Mg     1.7     05-20    TPro  6.3  /  Alb  3.6  /  TBili  0.2  /  DBili  x   /  AST  7   /  ALT  9   /  AlkPhos  79  05-20      LIVER FUNCTIONS - ( 20 May 2024 06:31 )  Alb: 3.6 g/dL / Pro: 6.3 g/dL / ALK PHOS: 79 U/L / ALT: 9 U/L / AST: 7 U/L / GGT: x           Urinalysis Basic - ( 20 May 2024 06:31 )    Color: x / Appearance: x / SG: x / pH: x  Gluc: 100 mg/dL / Ketone: x  / Bili: x / Urobili: x   Blood: x / Protein: x / Nitrite: x   Leuk Esterase: x / RBC: x / WBC x   Sq Epi: x / Non Sq Epi: x / Bacteria: x        Culture - Urine (collected 06-14-23 @ 16:40)  Source: Clean Catch Clean Catch (Midstream)  Final Report (06-16-23 @ 19:36):    <10,000 CFU/mL Normal Urogenital Dariana        Blood Gas Venous - Lactate: 1.0 mmol/L (05-19-24 @ 20:21)      INFECTIOUS DISEASES TESTING      INFLAMMATORY MARKERS      RADIOLOGY & ADDITIONAL TESTS:  I have personally reviewed the last Chest xray  CXR      CT  CT Angio Chest PE Protocol w/ IV Cont:   ACC: [id number] EXAM:  CT ANGIO CHEST PULM ART [facility]   ORDERED BY: ANNIKA HUTCHISON     PROCEDURE DATE:  05/19/2024          INTERPRETATION:  CLINICAL INDICATION: Hypoxia.    TECHNIQUE:  CTA of the thorax was performed after administration of contrast per the   PE protocol. Sagittal and coronal reformats were performed as well as MIP   reconstructions.  Intravenous contrast: 100 cc Omnipaque 350    COMPARISON: CT chest 4/11/2024. CTA chest 6/24/2022.    INTERPRETATION:    PULMONARY ARTERIES: Right lower lobe segmental pulmonary embolism with   calcifications which were seen on prior CT chest dated 4/11/2024 likely   reflecting chronic PE.    AIRWAYS/LUNGS/PLEURA: New partial opacification of the left lower lobe   central bronchi  Left lower lobe consolidative opacity with air   bronchograms and adjacent effusion, new. There is no pneumothorax.    MEDIASTINUM: Nonspecific subcentimeter mediastinal lymph nodes likely   reactive    HEART AND VESSELS: The heart is normal in size. There is no evidence of   right heart strain. Cardiac pacing device in the left chest.  Mild to   moderate pericardial effusion.  Left subclavian stent.    UPPER ABDOMEN: Cholecystectomy.    BONES AND SOFT TISSUES: No acute osseous abnormalities.  Collateral   vessel formation    IMPRESSION:    Right lower lobe segmental pulmonary embolism with calcifications with   similar appearance to chest CTA dated 6/24/2022 likely reflecting chronic   PE.    New partial opacification of the left lower lobe central bronchi and   lower lobe consolidation likely representing pneumonia/atelectasis.   Recommend follow-up CT chest in 6/8 weeks to assess for resolution.    Mild to moderate pericardial effusion, new.    Communication: The summary of above findings were discussed with readback   confirmation with Dr. Loya by radiology resident Rodolfo Ogden on   5/19/2024 at 10:50 PM.    --- End of Report ---          RODOLFO OGDEN MD; Resident Radiologist  This document has been electronically signed.  PARVEZ PANG; Attending Radiologist  This document has been electronically signed. May 19 2024 11:48PM (05-19-24 @ 21:09)      CARDIOLOGY TESTING      MEDICATIONS  ampicillin/sulbactam  IVPB     budesonide  80 MICROgram(s)/formoterol 4.5 MICROgram(s) Inhaler 2 Inhalation two times a day  cholecalciferol 2000 Oral daily  doxycycline IVPB     doxycycline IVPB 100 IV Intermittent once  doxycycline IVPB 100 IV Intermittent every 12 hours  enoxaparin Injectable 110 SubCutaneous every 24 hours  folic acid 1 Oral daily  guaiFENesin  Oral every 12 hours  metoprolol succinate  Oral daily  morphine ER Tablet 60 Oral <User Schedule>  tiotropium 2.5 MICROgram(s) Inhaler 2 Inhalation daily        ANTIBIOTICS:  ampicillin/sulbactam  IVPB      doxycycline IVPB      doxycycline IVPB 100 milliGRAM(s) IV Intermittent once  doxycycline IVPB 100 milliGRAM(s) IV Intermittent every 12 hours      ALLERGIES:  Plaquenil (Other)  Avelox (Other)  Arava (Anaphylaxis; Angioedema)  adhesives (Rash; Urticaria)  Vantin (Other (Mild))

## 2024-05-20 NOTE — H&P ADULT - ASSESSMENT
Pt is a 54 y/o F PMHx  of HTN, HLD, COPD on home O2 5L, SLE, antiphospholipid syndrome,  rheumatoid arthritis, cervical cancer sp hysterectomy,  laryngeal cancer s/p CT, RT, HFpEF (06/21/19 EF 63%) s/p AICD+PPM, DVT/PE (on lovenox QD),  NSTEMI in 2015, subclavian stenosis s/p stent placement presenting for pneumonia.    #LL Bacterial Pneumonia  #COPD not in exacerbation  - Leukocytosis, febrile, CT findings all consistent with PNA, no wheezing on physical exam  - Start Zosyn  - C/w supplemental O2, titrate PRN O2 sat goal  88 - 92%  - FU BCx, Sputum Cx, MRSA, Procal  - ID consult    #New mild-mod pericardial effusion  - Likely cause of pleuritic chest pain  - FU ESR and CRP  - FU Coxsackie assay  - FU TTE    #SLE   #RA  #APLS  #DVT/PE  - C/w lovenox home dose 110mg QD    #NSTEMI in 2015, subclavian stenosis s/p stent placement  - No longer on ASA 2/2 GI bleed      #Cervical cancer sp hysterectomy,  laryngeal cancer s/p CT - OP FU      #HFpEF (06/21/19 EF 63%) s/p AICD+PPM - C/w metoprolol 100mg QD    MED REC PER Patient    REFER TO CHART NOTE FOR ISTOP    # Misc  - DVT Prophylaxis: enoxaparin  - GI Prophylaxis: Not indicated  - Diet: Diet, DASH/TLC  - Dispo: Acute

## 2024-05-20 NOTE — H&P ADULT - HISTORY OF PRESENT ILLNESS
Pt is a 52 y/o F PMHx  of HTN, HLD, COPD on home O2, SLE, lupus anticoagulant, antiphospholipid syndrome,  rheumatoid arthritis, cervical cancer sp hysterectomy,  laryngeal cancer s/p CT, RT, HFpEF (06/21/19 EF 63%) s/p AICD+PPM, DVT/PE (on lovenox QD),  NSTEMI in 2015, subclavian stenosis s/p stent placement presenting for fever and pleuritic chest pain. Pt reports on Wed she woke up with pleuritic chest pain and increased SOB, She reports she has required her home O2 use more frequently as well. Reports TMax 104 and went to urgent care on Friday where she was given Augmentin. When her symptoms did not improve she presented to the ED.    /81 HR 93 RR 19 T98.4F Sat 98% on 3L O2    CT CHEST PE New partial opacification of the left lower lobe central bronchi and lower lobe consolidation likely representing pneumonia/atelectasis. Recommend follow-up CT chest in 6/8 weeks to assess for resolution.    Mild to moderate pericardial effusion, new.    Admitted to medicine

## 2024-05-20 NOTE — H&P ADULT - NSHPPHYSICALEXAM_GEN_ALL_CORE
LOS:     VITALS:   T(C): 36.9 (05-19-24 @ 18:55), Max: 36.9 (05-19-24 @ 18:55)  HR: 76 (05-20-24 @ 01:53) (76 - 93)  BP: 110/58 (05-20-24 @ 01:53) (110/58 - 139/81)  RR: 18 (05-20-24 @ 01:53) (18 - 19)  SpO2: 97% (05-20-24 @ 01:53) (92% - 99%)    GENERAL: NAD, lying in bed comfortably  HEAD:  Atraumatic, Normocephalic  CHEST/LUNG: Diffuse crackles with decreased breath sounds at bases  HEART: Regular rate and rhythm  ABDOMEN: BSx4; Soft, nontender, nondistended  EXTREMITIES:  No LE edema  NERVOUS SYSTEM:  A&Ox3, no focal deficits   SKIN: Warm and dry

## 2024-05-20 NOTE — H&P ADULT - NSICDXPASTSURGICALHX_GEN_ALL_CORE_FT
"Routing refill request to provider for review/approval because:  Medication is reported/historical  bp out of range    Last Written Prescription Date:    Last Fill Quantity: ,  # refills:    Last office visit provider:  12/23/21     Requested Prescriptions   Pending Prescriptions Disp Refills     irbesartan (AVAPRO) 300 MG tablet [Pharmacy Med Name: IRBESARTAN 300MG TABLETS] 90 tablet      Sig: TAKE 1 TABLET(300 MG) BY MOUTH DAILY       Angiotensin-II Receptors Failed - 9/4/2022  4:39 AM        Failed - Last blood pressure under 140/90 in past 12 months     BP Readings from Last 3 Encounters:   08/25/22 (!) 154/66   07/29/22 118/70   12/23/21 139/80                 Passed - Recent (12 mo) or future (30 days) visit within the authorizing provider's specialty     Patient has had an office visit with the authorizing provider or a provider within the authorizing providers department within the previous 12 mos or has a future within next 30 days. See \"Patient Info\" tab in inbasket, or \"Choose Columns\" in Meds & Orders section of the refill encounter.              Passed - Medication is active on med list        Passed - Patient is age 18 or older        Passed - No active pregnancy on record        Passed - Normal serum creatinine on file in past 12 months     Recent Labs   Lab Test 12/23/21  1007   CR 0.70       Ok to refill medication if creatinine is low          Passed - Normal serum potassium on file in past 12 months     Recent Labs   Lab Test 12/23/21  1007   POTASSIUM 3.6                    Passed - No positive pregnancy test in past 12 months             Elba Bowman RN 09/04/22 5:28 PM  "
PAST SURGICAL HISTORY:  AICD (automatic cardioverter/defibrillator) present [employer]    H/O foot surgery     History of back surgery     S/P appendectomy     S/P cholecystectomy     S/P eye surgery     S/P hysterectomy     Subclavian arterial stenosis

## 2024-05-20 NOTE — CONSULT NOTE ADULT - ASSESSMENT
ASSESSMENT  52 y/o F PMHx  of HTN, HLD, COPD on home O2, SLE, lupus anticoagulant, antiphospholipid syndrome,  rheumatoid arthritis, cervical cancer sp hysterectomy,  laryngeal cancer s/p CT, RT, HFpEF (06/21/19 EF 63%) s/p AICD+PPM, DVT/PE (on lovenox QD),  NSTEMI in 2015, subclavian stenosis s/p stent placement presenting for fever and pleuritic chest pain.     IMPRESSION  #    Was on PO augmentin as outpatient     CTA Right lower lobe segmental pulmonary embolism with calcifications with   similar appearance to chest CTA dated 6/24/2022 likely reflecting chronic   PE.  New partial opacification of the left lower lobe central bronchi and lower lobe consolidation likely representing pneumonia/atelectasis.   Recommend follow-up CT chest in 6/8 weeks to assess for resolution.  Mild to moderate pericardial effusion, new.  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 All final recommendations to follow after interview and examination of the patient. Please follow recommendations noted below.    If any questions, please send a message or call on [employer] Teams  Please continue to update ID with any pertinent new laboratory, radiographic findings, or change in clinical status ASSESSMENT  52 y/o F PMHx  of HTN, HLD, COPD on home O2, SLE, lupus anticoagulant, antiphospholipid syndrome,  rheumatoid arthritis, cervical cancer sp hysterectomy,  laryngeal cancer s/p CT, RT, HFpEF (06/21/19 EF 63%) s/p AICD+PPM, DVT/PE (on lovenox QD),  NSTEMI in 2015, subclavian stenosis s/p stent placement presenting for fever and pleuritic chest pain.     IMPRESSION  #Possible CAP, chronic PE     Was on PO augmentin as outpatient     CTA Right lower lobe segmental pulmonary embolism with calcifications with   similar appearance to chest CTA dated 6/24/2022 likely reflecting chronic   PE.  New partial opacification of the left lower lobe central bronchi and lower lobe consolidation likely representing pneumonia/atelectasis.   Recommend follow-up CT chest in 6/8 weeks to assess for resolution.  Mild to moderate pericardial effusion, new.  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    Creatinine: 1.0 (05-20-24 @ 06:31)    Height (cm): 160 (01-10-24 @ 07:27)  Weight (kg): 76 (05-20-24 @ 05:16)    RECOMMENDATIONS  - Continue unasyn 3g q6h IV  - Continue doxycycline IVPB 100 milliGRAM(s) IV Intermittent every 12 hours  - Send urine Ag for Strep and Legionella   - procalcitonin   - Sputum cx if possible  - MRSA nares    If any questions, please send a message or call on [employer] Teams  Please continue to update ID with any pertinent new laboratory, radiographic findings, or change in clinical status

## 2024-05-20 NOTE — CONSULT NOTE ADULT - ASSESSMENT
IMPRESSION:    Acute hypoxemic resp failure  L side pneumonia/ Pneumonitis ( new)  New pericardial effusion  HO SLE/ RA  HO DVT/ chronic PE  HO COPD      PLAN:      - ABX  - Pancx, procal  - Neb q 6  - C3, C4, level, rheumato eval  - Echo evaluate effusion  - steroids  - POX on RA  - chronic PE continue AC

## 2024-05-20 NOTE — H&P ADULT - ATTENDING COMMENTS
Pt is a 52 y/o F PMHx  of HTN, HLD, COPD on home O2 5L, SLE, antiphospholipid syndrome,  rheumatoid arthritis, cervical cancer sp hysterectomy,  laryngeal cancer s/p CT, RT, HFpEF (06/21/19 EF 63%) s/p AICD+PPM, DVT/PE (on lovenox QD),  NSTEMI in 2015, subclavian stenosis s/p stent placement presenting for pneumonia.    1. LL Bacterial Pneumonia with hypoxia. hx of COPD   - Admit to medicine                  - Switch from zosyn to unasyn and doxycycline (allergic to vantin and moxifloxacin)   - Blood cx:pending                             - Sputum cx:pending              - MRSA;pending            - Procalcitonin:pending    - Mucinex bid   - C/w supplemental O2, titrate PRN O2 sat goal  88 - 92%  - Start Prednisone 40mg daily   - Cont inhalers   - CT chest:  a) Right lower lobe segmental pulmonary embolism with calcifications with similar appearance to chest CTA dated 6/24/2022 likely reflecting chronic PE.  b) New partial opacification of the left lower lobe central bronchi and lower lobe consolidation likely representing pneumonia/atelectasis. Recommend follow-up CT chest in 6/8 weeks to assess for resolution.  c) Mild to moderate pericardial effusion, new.  - ID consult appreciated     2. New mild-mod pericardial effusion Likely cause of pleuritic chest pain  - ESR:pending              - CRP:pending              - Coxasacki:pending   - Trop negative*2   - Echocardio:pending   - Cardio consult:pending    3. SLE /RA/ APLS/DVT/PE  * YOON was positive but ds DNA was negative   * last note by heme onc plan was for warfarin with target INR 3-4 and aspirin 81mg po daily  - C/w lovenox home dose 110mg QD (home dose 1.5mg/kg daily)     4. NSTEMI in 2015, subclavian stenosis s/p stent placement  - No longer on ASA 2/2 GI bleed    5. Cervical cancer sp hysterectomy,  laryngeal cancer s/p CT on remission  - Follow up as outpatient     6. HFpEF (06/21/19 EF 63%) s/p AICD+PPM   - C/w metoprolol 100mg QD      # Misc  - DVT Prophylaxis: enoxaparin  - GI Prophylaxis: Not indicated  - Diet: Diet, DASH/TLC  - Dispo: Acute Pt is a 52 y/o F PMHx  of HTN, HLD, COPD on home O2 5L, SLE, antiphospholipid syndrome,  rheumatoid arthritis, cervical cancer sp hysterectomy,  laryngeal cancer s/p CT, RT, HFpEF (06/21/19 EF 63%) s/p AICD+PPM, DVT/PE (on lovenox QD),  NSTEMI in 2015, subclavian stenosis s/p stent placement presenting for pneumonia.    1. LL Bacterial Pneumonia with hypoxia. hx of COPD   - Admit to medicine                  - Switch from zosyn to unasyn and doxycycline (allergic to vantin and moxifloxacin)   - Blood cx:pending                             - Sputum cx:pending              - MRSA;pending            - Procalcitonin:pending    - Mucinex bid   - C/w supplemental O2, titrate PRN O2 sat goal  88 - 92%  - Start Prednisone 40mg daily for 5 days (might need longer taper course in case of pericarditis)  d:1   - Cont inhalers   - CT chest:  a) Right lower lobe segmental pulmonary embolism with calcifications with similar appearance to chest CTA dated 6/24/2022 likely reflecting chronic PE.  b) New partial opacification of the left lower lobe central bronchi and lower lobe consolidation likely representing pneumonia/atelectasis. Recommend follow-up CT chest in 6/8 weeks to assess for resolution.  c) Mild to moderate pericardial effusion, new.  - ID consult appreciated     2. New mild-mod pericardial effusion Likely cause of pleuritic chest pain. Possible acute pericarditis   - ESR:elevated            - CRP:elevated            - Coxasacki:pending   - Trop negative*2   - Echocardio:pending  - Started on prednisone for pneumonia that will help in case of pericarditis    * Cardio wants to wait for echo and inflammatory markers       3. SLE /RA/ APLS/DVT/PE  * YOON was positive but ds DNA was negative   * last note by heme onc plan was for warfarin with target INR 3-4 and aspirin 81mg po daily that was switch to lovenox due to cerebral bleeding   - C/w lovenox home dose 110mg QD (home dose 1.5mg/kg daily)     4. NSTEMI in 2015, subclavian stenosis s/p stent placement  - No longer on ASA 2/2 GI bleed    5. Cervical cancer sp hysterectomy,  laryngeal cancer s/p CT on remission  - Follow up as outpatient     6. HFpEF (06/21/19 EF 63%) s/p AICD+PPM   - C/w metoprolol 100mg QD      # Misc  - DVT Prophylaxis: enoxaparin  - GI Prophylaxis PPI   - Diet: Diet, DASH/TLC  - Dispo: Acute

## 2024-05-20 NOTE — CHART NOTE - NSCHARTNOTEFT_GEN_A_CORE
[facility]  Prescription Monitoring Program Registry    Reference #: [id number]    Practitioner Count: 4  Pharmacy Count: 1  Current Opioid Prescriptions: 0  Current Benzodiazepine Prescriptions: 1  Current Stimulant Prescriptions: 0      Patient Demographic Information (PDI)       PDI	First Name	Last Name	Birth Date	Gender	Street Address	[facility] Code  ANGELY Bauman	1970	Female	[address]	[facility]	[id number]    Prescription Information      PDI Filter:    PDI	Current Rx	Drug Type	Rx Written	Rx Dispensed	Drug	Quantity	Days Supply	Prescriber Name	Prescriber ANGI #	Payment Method	Dispenser  A	Y	B	04/16/2024	04/22/2024	alprazolam 1 mg tablet	120	30	Jo Bush MD	[id number]	Medicare	[id number]  A	N	O	04/03/2024	04/18/2024	morphine sulfate ir 15 mg tab	60	30	Cheryl Corey MD	[id number]	Medicare	[facility][id number]  A	N	O	04/03/2024	04/17/2024	morphine sulf er 30 mg tablet	210	30	Cheryl Corey MD	[id number]	Medicare	[facility][id number]  A	N	B	03/06/2024	03/22/2024	alprazolam 1 mg tablet	120	30	Jo Bush MD	[id number]	Medicare	[facility][id number]  A	N	O	03/14/2024	03/19/2024	morphine sulfate ir 15 mg tab	60	30	Stephanie Bhatt	[id number]	Medicare	[facility][id number]  A	N	O	03/14/2024	03/19/2024	morphine sulf er 30 mg tablet	208	26	Danii Burnette	[id number]	Medicare	[facility][id number]  A	N	B	02/08/2024	02/20/2024	alprazolam 1 mg tablet	120	30	Jo Bush MD	[id number]	Medicare	[facility][id number]  A	N	O	02/14/2024	02/18/2024	morphine sulfate ir 15 mg tab	60	30	Stephanie Bhatt	[id number]	Medicare	[facility][id number]  A	N	O	02/14/2024	02/18/2024	morphine sulf er 30 mg tablet	240	30	Stephanie Bhatt	[id number]	Medicare	[facility][id number]  A	N	O	01/26/2024	01/27/2024	morphine sulf er 60 mg tablet	100	20	Danii Burnette N	[id number]	Insurance	[facility]  A	N	B	01/04/2024	01/21/2024	alprazolam 1 mg tablet	90	30	Musa Magana MD	[id number]	Medicare	[facility][id number]  A	N	O	01/17/2024	01/19/2024	morphine sulf er 60 mg tablet	33	6	Pawan Burnetteaine N	[id number]	Medicare	[facility][id number]  A	N	O	01/17/2024	01/17/2024	morphine sulfate ir 15 mg tab	60	30	Pawan Burnetterayo PRESTON	[id number]	Medicare	[facility][id number]  A	N	B	12/07/2023	01/05/2024	alprazolam 1 mg tablet	90	15	Lan Chaney MD	[id number]	Medicare	[facility][id number]  A	N	B	12/19/2023	12/21/2023	alprazolam 1 mg tablet	90	15	Jo Bush MD	[id number]	Medicare	[facility][id number]  A	N	O	12/18/2023	12/18/2023	morphine sulf er 60 mg tablet	180	30	Steven Diaz	[id number]	Medicare	[facility][id number]  A	N	O	12/18/2023	12/18/2023	morphine sulfate ir 15 mg tab	60	30	Steven Diaz	[id number]	Medicare	[facility][id number]  A	N	O	12/07/2023	12/13/2023	morphine sulfate ir 15 mg tab	10	5	Steven Diaz	[id number]	Medicare	[facility][id number]  A	N	O	12/07/2023	12/13/2023	morphine sulf er 60 mg tablet	30	5	Steven Diaz	[id number]	Medicare	[facility][id number]  A	N	B	12/06/2023	12/06/2023	alprazolam 1 mg tablet	90	15	Lan Chaney MD	[id number]	Medicare	[facility][id number]  A	N	B	11/20/2023	11/22/2023	alprazolam 1 mg tablet	90	15	Lan Chaney MD	[id number]	Medicare	[facility][id number]  A	N		11/20/2023	11/22/2023	zolpidem tartrate 10 mg tablet	30	30	Lan Chaney MD	[id number]	Medicare	[facility][id number]  A	N	O	11/13/2023	11/13/2023	morphine sulfate ir 15 mg tab	60	30	Steven Diaz	[id number]	Medicare	[facility][id number]  A	N	O	11/13/2023	11/13/2023	morphine sulf er 60 mg tablet	180	30	Steven Diaz	[id number]	Medicare	[facility][id number]  A	N	O	11/03/2023	11/08/2023	morphine sulf er 60 mg tablet	24	4	Steven Diaz	[id number]	Medicare	[facility][id number]  A	N	B	11/06/2023	11/08/2023	alprazolam 1 mg tablet	90	15	Lan Chaney MD	[id number]	Medicare	[facility][id number]  A	N	B	10/26/2023	10/28/2023	alprazolam 1 mg tablet	90	15	Lan Chaney MD	[id number]	Medicare	[facility][id number]  A	N	O	10/05/2023	10/14/2023	morphine sulfate ir 15 mg tab	60	30	Steven Diaz	[id number]	Medicare	[facility][id number]  A	N	B	10/10/2023	10/12/2023	alprazolam 1 mg tablet	90	15	Lan Chaney MD	[id number]	Medicare	[id number]  A	N		10/10/2023	10/12/2023	zolpidem tartrate 10 mg tablet	30	30	Lan Chaney MD	[id number]	Medicare	[facility][id number]  A	N	O	10/05/2023	10/09/2023	morphine sulf er 60 mg tablet	180	30	Josh Diazy	[id number]	Medicare	[facility][id number]  A	N	B	09/22/2023	09/27/2023	alprazolam 1 mg tablet	90	15	Lan Chaney MD	[id number]	Medicare	[facility][id number]  A	N	O	09/06/2023	09/14/2023	morphine sulfate ir 15 mg tab	60	30	Danii Burnette	[id number]	Medicare	[facility][id number]  A	N		09/11/2023	09/12/2023	zolpidem tartrate 10 mg tablet	30	30	Lan Chaney MD	[id number]	Medicare	[facility][id number]  A	N	B	09/11/2023	09/12/2023	alprazolam 1 mg tablet	90	15	Lan Chaney MD	[id number]	Medicare	[facility][id number]  A	N	O	09/06/2023	09/11/2023	morphine sulf er 60 mg tablet	150	30	Danii Burnette	[id number]	Medicare	[facility][id number]  A	N	O	09/06/2023	09/06/2023	morphine sulf er 60 mg tablet	30	5	Danii Burnette	[id number]	Medicare	[id number]  A	N	B	08/24/2023	08/28/2023	alprazolam 1 mg tablet	90	15	Lan Chaney MD	[id number]	Medicare	[facility][id number]  A	N	O	08/07/2023	08/15/2023	morphine sulfate ir 15 mg tab	60	30	Steven Diaz	[id number]	Medicare	[id number]  A	N	B	08/07/2023	08/13/2023	alprazolam 1 mg tablet	90	15	Lan Chaney MD	[id number]	Medicare	[id number]  A	N	O	08/07/2023	08/12/2023	morphine sulf er 60 mg tablet	150	30	Steven Diaz	[id number]	Medicare	[facility][id number]  A	N		08/07/2023	08/07/2023	zolpidem tartrate 10 mg tablet	30	30	Lan Chaney MD	[id number]	Medicare	[facility][id number]  A	N	O	08/07/2023	08/07/2023	morphine sulf er 60 mg tablet	30	5	Steven Diaz	[id number]	Medicare	[id number]  A	N	O	07/20/2023	07/29/2023	morphine sulf er 60 mg tablet	60	10	Steven Diaz	[id number]	Medicare	[facility][id number]  A	N	B	07/28/2023	07/29/2023	alprazolam 1 mg tablet	90	15	Lan Chaney MD	[id number]	Medicare	[facility][id number]  A	N	O	06/23/2023	07/16/2023	morphine sulfate ir 15 mg tab	60	30	Steven Diaz	[id number]	Medicare	[facility][id number]  A	N	B	07/13/2023	07/14/2023	alprazolam 1 mg tablet	90	15	Lan Chaney MD	[id number]	Medicare	[id number]  A	N	O	06/23/2023	06/29/2023	morphine sulf er 60 mg tablet	180	30	Josh Diazy	[id number]	Medicare	[facility][id number]  A	N		06/26/2023	06/28/2023	zolpidem tartrate 10 mg tablet	30	30	TamLan drummond MD	[id number]	Medicare	[facility][id number]  A	N	B	06/26/2023	06/28/2023	alprazolam 1 mg tablet	90	15	TamLan drummond MD	[id number]	Medicare	[facility][id number]  A	N	O	05/24/2023	06/16/2023	morphine sulfate ir 15 mg tab	60	30	EmilySteven	[id number]	Medicare	[id number]  A	N	B	06/11/2023	06/13/2023	alprazolam 1 mg tablet	90	15	TamLan drummond MD	[id number]	Medicare	[id number]  A	N	B	05/25/2023	05/30/2023	alprazolam 1 mg tablet	90	15	TamLan drummond MD	[id number]	Medicare	[id number]  A	N		05/25/2023	05/30/2023	zolpidem tartrate 10 mg tablet	30	30	Lan Chaney MD	[id number]	Medicare	[id number]  A	N	O	05/22/2023	05/30/2023	morphine sulf er 60 mg tablet	180	30	Josh Diazy	[id number]	Medicare	[id number]    * - Details of Drug Type : O = Opioid, B = Benzodiazepine, S = Stimulant    * - Drugs marked with an asterisk are compound drugs. If the compound drug is made up of more than one controlled substance, then each controlled substance will be a separate row in the table.          † Click the ‘Report Suspicious Activity’ button to report information related to controlled substance suspicious activity to the [place] [employer].    †† Click the ‘Send Questions/Comments’ button to send questions about this report to the [place] [employer], or call [phone].    ††† Click the ‘Substance Use Disorder Treatment’ button to go to the Office of Addiction Services and Supports website, www.[place].gov or call 1-866.669.3261.    © 2017 [facility] Department of Health -[place] [employer]56/20/2024 03:30  .

## 2024-05-20 NOTE — CONSULT NOTE ADULT - SUBJECTIVE AND OBJECTIVE BOX
Patient is a 53y old  Female who presents with a chief complaint of SOB (20 May 2024 09:07)    52 y/o F PMHx  of HTN, HLD, COPD,  SLE, lupus anticoagulant, antiphospholipid syndrome,  rheumatoid arthritis/ SLE, cervical cancer sp hysterectomy,  laryngeal cancer s/p CT, RT, HFpEF (06/21/19 EF 63%) s/p AICD+PPM, DVT/PE (on lovenox QD),  NSTEMI in 2015, subclavian stenosis s/p stent placement presenting for fever and pleuritic chest pain. Pt reports on Wed she woke up with pleuritic chest pain and increased SOB, She reports she has required her home O2 use more frequently as well. Reports TMax 104 and went to urgent care on Friday where she was given Augmentin. When her symptoms did not improve she presented to the ED.    /81 HR 93 RR 19 T98.4F Sat 98% on 3L O2    CT CHEST PE New partial opacification of the left lower lobe central bronchi and lower lobe consolidation likely representing pneumonia/atelectasis. Recommend follow-up CT chest in 6/8 weeks to assess for resolution.    Mild to moderate pericardial effusion, new.    Admitted to medicine Called to evaluate   (20 May 2024 03:44)      PAST MEDICAL & SURGICAL HISTORY:  Lupus      RA (rheumatoid arthritis)      HTN (hypertension)      CHF (congestive heart failure)      COPD (chronic obstructive pulmonary disease)      DVT (deep venous thrombosis)      Pulmonary embolism      History of laryngeal cancer      GERD (gastroesophageal reflux disease)      Cervical cancer      APS (antiphospholipid syndrome)      S/P appendectomy      S/P cholecystectomy      AICD (automatic cardioverter/defibrillator) present  [employer]      S/P hysterectomy      History of back surgery      H/O foot surgery      S/P eye surgery      Subclavian arterial stenosis          SOCIAL HX:   Smoking  ex    FAMILY HISTORY:  Family history of cervical cancer    FH: thyroid cancer        REVIEW OF SYSTEMS see hpi  	    Allergies    Plaquenil (Other)  Avelox (Other)  Arava (Anaphylaxis; Angioedema)  adhesives (Rash; Urticaria)  Vantin (Other (Mild))    Intolerances        acetaminophen     Tablet .. 650 milliGRAM(s) Oral every 6 hours PRN  albuterol    90 MICROgram(s) HFA Inhaler 2 Puff(s) Inhalation every 6 hours PRN  ALPRAZolam 1 milliGRAM(s) Oral four times a day PRN  aluminum hydroxide/magnesium hydroxide/simethicone Suspension 30 milliLiter(s) Oral every 4 hours PRN  ampicillin/sulbactam  IVPB      ampicillin/sulbactam  IVPB 3 Gram(s) IV Intermittent every 6 hours  budesonide  80 MICROgram(s)/formoterol 4.5 MICROgram(s) Inhaler 2 Puff(s) Inhalation two times a day  cholecalciferol 2000 Unit(s) Oral daily  colchicine 1.2 milliGRAM(s) Oral once  diphenhydrAMINE Injectable 50 milliGRAM(s) IV Push once PRN  doxycycline IVPB 100 milliGRAM(s) IV Intermittent every 12 hours  doxycycline IVPB      enoxaparin Injectable 110 milliGRAM(s) SubCutaneous every 24 hours  folic acid 1 milliGRAM(s) Oral daily  guaiFENesin  milliGRAM(s) Oral every 12 hours  ibuprofen  Tablet. 600 milliGRAM(s) Oral every 8 hours PRN  melatonin 3 milliGRAM(s) Oral at bedtime PRN  metoprolol succinate  milliGRAM(s) Oral daily  morphine  IR 15 milliGRAM(s) Oral two times a day PRN  morphine ER Tablet 60 milliGRAM(s) Oral <User Schedule>  morphine ER Tablet 30 milliGRAM(s) Oral <User Schedule> PRN  ondansetron Injectable 4 milliGRAM(s) IV Push every 8 hours PRN  pantoprazole    Tablet 40 milliGRAM(s) Oral before breakfast  predniSONE   Tablet 40 milliGRAM(s) Oral daily  tiotropium 2.5 MICROgram(s) Inhaler 2 Puff(s) Inhalation daily  : Home Meds:      PHYSICAL EXAM    ICU Vital Signs Last 24 Hrs  T(C): 36.8 (20 May 2024 15:42), Max: 36.9 (19 May 2024 18:55)  T(F): 98.3 (20 May 2024 15:42), Max: 98.4 (19 May 2024 18:55)  HR: 84 (20 May 2024 15:42) (71 - 93)  BP: 115/74 (20 May 2024 15:42) (110/58 - 139/81)  BP(mean): 88 (20 May 2024 15:42) (77 - 92)  RR: 18 (20 May 2024 15:42) (18 - 19)  SpO2: 97% (20 May 2024 15:42) (92% - 99%)    O2 Parameters below as of 20 May 2024 15:42  Patient On (Oxygen Delivery Method): room air            General: comfortable  Lungs: l side crackles  Cardiovascular: Regular  Abdomen: Soft, Positive BS  Extremities: No clubbing  Skin: Warm  Neurological: Non focal         LABS:                          11.5   10.54 )-----------( 255      ( 20 May 2024 06:31 )             34.8                                               05-20    137  |  100  |  11  ----------------------------<  100<H>  3.8   |  28  |  1.0    Ca    9.3      20 May 2024 06:31  Mg     1.7     05-20    TPro  6.3  /  Alb  3.6  /  TBili  0.2  /  DBili  x   /  AST  7   /  ALT  9   /  AlkPhos  79  05-20      PT/INR - ( 20 May 2024 01:38 )   PT: 11.60 sec;   INR: 1.02 ratio         PTT - ( 20 May 2024 01:38 )  PTT:54.7 sec                                       Urinalysis Basic - ( 20 May 2024 06:31 )    Color: x / Appearance: x / SG: x / pH: x  Gluc: 100 mg/dL / Ketone: x  / Bili: x / Urobili: x   Blood: x / Protein: x / Nitrite: x   Leuk Esterase: x / RBC: x / WBC x   Sq Epi: x / Non Sq Epi: x / Bacteria: x                                                  LIVER FUNCTIONS - ( 20 May 2024 06:31 )  Alb: 3.6 g/dL / Pro: 6.3 g/dL / ALK PHOS: 79 U/L / ALT: 9 U/L / AST: 7 U/L / GGT: x                                                                                                                                       MEDICATIONS  (STANDING):  ampicillin/sulbactam  IVPB      ampicillin/sulbactam  IVPB 3 Gram(s) IV Intermittent every 6 hours  budesonide  80 MICROgram(s)/formoterol 4.5 MICROgram(s) Inhaler 2 Puff(s) Inhalation two times a day  cholecalciferol 2000 Unit(s) Oral daily  colchicine 1.2 milliGRAM(s) Oral once  doxycycline IVPB      doxycycline IVPB 100 milliGRAM(s) IV Intermittent every 12 hours  enoxaparin Injectable 110 milliGRAM(s) SubCutaneous every 24 hours  folic acid 1 milliGRAM(s) Oral daily  guaiFENesin  milliGRAM(s) Oral every 12 hours  metoprolol succinate  milliGRAM(s) Oral daily  morphine ER Tablet 60 milliGRAM(s) Oral <User Schedule>  pantoprazole    Tablet 40 milliGRAM(s) Oral before breakfast  predniSONE   Tablet 40 milliGRAM(s) Oral daily  tiotropium 2.5 MICROgram(s) Inhaler 2 Puff(s) Inhalation daily    MEDICATIONS  (PRN):  acetaminophen     Tablet .. 650 milliGRAM(s) Oral every 6 hours PRN Temp greater or equal to 38C (100.4F), Mild Pain (1 - 3)  albuterol    90 MICROgram(s) HFA Inhaler 2 Puff(s) Inhalation every 6 hours PRN Shortness of Breath and/or Wheezing  ALPRAZolam 1 milliGRAM(s) Oral four times a day PRN Anxiety  aluminum hydroxide/magnesium hydroxide/simethicone Suspension 30 milliLiter(s) Oral every 4 hours PRN Dyspepsia  diphenhydrAMINE Injectable 50 milliGRAM(s) IV Push once PRN Allergy symptoms  ibuprofen  Tablet. 600 milliGRAM(s) Oral every 8 hours PRN Moderate Pain (4 - 6)  melatonin 3 milliGRAM(s) Oral at bedtime PRN Insomnia  morphine  IR 15 milliGRAM(s) Oral two times a day PRN Severe Pain (7 - 10)  morphine ER Tablet 30 milliGRAM(s) Oral <User Schedule> PRN Pain 4-10  ondansetron Injectable 4 milliGRAM(s) IV Push every 8 hours PRN Nausea and/or Vomiting

## 2024-05-21 ENCOUNTER — RESULT REVIEW (OUTPATIENT)
Age: 54
End: 2024-05-21

## 2024-05-21 LAB
ALBUMIN SERPL ELPH-MCNC: 3.6 G/DL — SIGNIFICANT CHANGE UP (ref 3.5–5.2)
ALP SERPL-CCNC: 96 U/L — SIGNIFICANT CHANGE UP (ref 30–115)
ALT FLD-CCNC: 23 U/L — SIGNIFICANT CHANGE UP (ref 0–41)
ANION GAP SERPL CALC-SCNC: 14 MMOL/L — SIGNIFICANT CHANGE UP (ref 7–14)
APTT BLD: 60.4 SEC — HIGH (ref 27–39.2)
AST SERPL-CCNC: 37 U/L — SIGNIFICANT CHANGE UP (ref 0–41)
BASOPHILS # BLD AUTO: 0.03 K/UL — SIGNIFICANT CHANGE UP (ref 0–0.2)
BASOPHILS NFR BLD AUTO: 0.4 % — SIGNIFICANT CHANGE UP (ref 0–1)
BILIRUB SERPL-MCNC: 0.3 MG/DL — SIGNIFICANT CHANGE UP (ref 0.2–1.2)
BUN SERPL-MCNC: 10 MG/DL — SIGNIFICANT CHANGE UP (ref 10–20)
CALCIUM SERPL-MCNC: 9.4 MG/DL — SIGNIFICANT CHANGE UP (ref 8.4–10.5)
CHLORIDE SERPL-SCNC: 102 MMOL/L — SIGNIFICANT CHANGE UP (ref 98–110)
CO2 SERPL-SCNC: 26 MMOL/L — SIGNIFICANT CHANGE UP (ref 17–32)
CREAT SERPL-MCNC: 0.9 MG/DL — SIGNIFICANT CHANGE UP (ref 0.7–1.5)
EGFR: 76 ML/MIN/1.73M2 — SIGNIFICANT CHANGE UP
EOSINOPHIL # BLD AUTO: 0.17 K/UL — SIGNIFICANT CHANGE UP (ref 0–0.7)
EOSINOPHIL NFR BLD AUTO: 2.4 % — SIGNIFICANT CHANGE UP (ref 0–8)
GLUCOSE SERPL-MCNC: 92 MG/DL — SIGNIFICANT CHANGE UP (ref 70–99)
HCT VFR BLD CALC: 35.8 % — LOW (ref 37–47)
HGB BLD-MCNC: 11.7 G/DL — LOW (ref 12–16)
IMM GRANULOCYTES NFR BLD AUTO: 0.6 % — HIGH (ref 0.1–0.3)
LEGIONELLA AG UR QL: NEGATIVE — SIGNIFICANT CHANGE UP
LYMPHOCYTES # BLD AUTO: 2.98 K/UL — SIGNIFICANT CHANGE UP (ref 1.2–3.4)
LYMPHOCYTES # BLD AUTO: 42.8 % — SIGNIFICANT CHANGE UP (ref 20.5–51.1)
MAGNESIUM SERPL-MCNC: 1.6 MG/DL — LOW (ref 1.8–2.4)
MCHC RBC-ENTMCNC: 29.3 PG — SIGNIFICANT CHANGE UP (ref 27–31)
MCHC RBC-ENTMCNC: 32.7 G/DL — SIGNIFICANT CHANGE UP (ref 32–37)
MCV RBC AUTO: 89.7 FL — SIGNIFICANT CHANGE UP (ref 81–99)
MONOCYTES # BLD AUTO: 0.64 K/UL — HIGH (ref 0.1–0.6)
MONOCYTES NFR BLD AUTO: 9.2 % — SIGNIFICANT CHANGE UP (ref 1.7–9.3)
MRSA PCR RESULT.: NEGATIVE — SIGNIFICANT CHANGE UP
NEUTROPHILS # BLD AUTO: 3.1 K/UL — SIGNIFICANT CHANGE UP (ref 1.4–6.5)
NEUTROPHILS NFR BLD AUTO: 44.6 % — SIGNIFICANT CHANGE UP (ref 42.2–75.2)
NRBC # BLD: 0 /100 WBCS — SIGNIFICANT CHANGE UP (ref 0–0)
PLATELET # BLD AUTO: 216 K/UL — SIGNIFICANT CHANGE UP (ref 130–400)
PMV BLD: 11.7 FL — HIGH (ref 7.4–10.4)
POTASSIUM SERPL-MCNC: 4.2 MMOL/L — SIGNIFICANT CHANGE UP (ref 3.5–5)
POTASSIUM SERPL-SCNC: 4.2 MMOL/L — SIGNIFICANT CHANGE UP (ref 3.5–5)
PROT SERPL-MCNC: 6.1 G/DL — SIGNIFICANT CHANGE UP (ref 6–8)
RBC # BLD: 3.99 M/UL — LOW (ref 4.2–5.4)
RBC # FLD: 13.6 % — SIGNIFICANT CHANGE UP (ref 11.5–14.5)
S PNEUM AG UR QL: NEGATIVE — SIGNIFICANT CHANGE UP
SODIUM SERPL-SCNC: 142 MMOL/L — SIGNIFICANT CHANGE UP (ref 135–146)
WBC # BLD: 6.96 K/UL — SIGNIFICANT CHANGE UP (ref 4.8–10.8)
WBC # FLD AUTO: 6.96 K/UL — SIGNIFICANT CHANGE UP (ref 4.8–10.8)

## 2024-05-21 PROCEDURE — 71045 X-RAY EXAM CHEST 1 VIEW: CPT | Mod: 26

## 2024-05-21 PROCEDURE — 93306 TTE W/DOPPLER COMPLETE: CPT | Mod: 26

## 2024-05-21 PROCEDURE — 99232 SBSQ HOSP IP/OBS MODERATE 35: CPT

## 2024-05-21 RX ORDER — POLYETHYLENE GLYCOL 3350 17 G/17G
17 POWDER, FOR SOLUTION ORAL DAILY
Refills: 0 | Status: DISCONTINUED | OUTPATIENT
Start: 2024-05-21 | End: 2024-05-22

## 2024-05-21 RX ORDER — SENNA PLUS 8.6 MG/1
2 TABLET ORAL AT BEDTIME
Refills: 0 | Status: DISCONTINUED | OUTPATIENT
Start: 2024-05-21 | End: 2024-05-26

## 2024-05-21 RX ORDER — MORPHINE SULFATE 50 MG/1
15 CAPSULE, EXTENDED RELEASE ORAL EVERY 4 HOURS
Refills: 0 | Status: DISCONTINUED | OUTPATIENT
Start: 2024-05-21 | End: 2024-05-24

## 2024-05-21 RX ORDER — MORPHINE SULFATE 50 MG/1
30 CAPSULE, EXTENDED RELEASE ORAL
Refills: 0 | Status: DISCONTINUED | OUTPATIENT
Start: 2024-05-21 | End: 2024-05-21

## 2024-05-21 RX ORDER — ALPRAZOLAM 0.25 MG
1 TABLET ORAL EVERY 6 HOURS
Refills: 0 | Status: DISCONTINUED | OUTPATIENT
Start: 2024-05-21 | End: 2024-05-26

## 2024-05-21 RX ORDER — NALOXEGOL OXALATE 12.5 MG/1
25 TABLET, FILM COATED ORAL DAILY
Refills: 0 | Status: DISCONTINUED | OUTPATIENT
Start: 2024-05-21 | End: 2024-05-26

## 2024-05-21 RX ORDER — MORPHINE SULFATE 50 MG/1
60 CAPSULE, EXTENDED RELEASE ORAL
Refills: 0 | Status: DISCONTINUED | OUTPATIENT
Start: 2024-05-21 | End: 2024-05-26

## 2024-05-21 RX ADMIN — MORPHINE SULFATE 60 MILLIGRAM(S): 50 CAPSULE, EXTENDED RELEASE ORAL at 13:06

## 2024-05-21 RX ADMIN — Medication 1 MILLIGRAM(S): at 06:23

## 2024-05-21 RX ADMIN — BUDESONIDE AND FORMOTEROL FUMARATE DIHYDRATE 2 PUFF(S): 160; 4.5 AEROSOL RESPIRATORY (INHALATION) at 08:01

## 2024-05-21 RX ADMIN — Medication 100 MILLIGRAM(S): at 05:44

## 2024-05-21 RX ADMIN — Medication 100 MILLIGRAM(S): at 18:47

## 2024-05-21 RX ADMIN — Medication 40 MILLIGRAM(S): at 05:43

## 2024-05-21 RX ADMIN — AMPICILLIN SODIUM AND SULBACTAM SODIUM 200 GRAM(S): 250; 125 INJECTION, POWDER, FOR SUSPENSION INTRAMUSCULAR; INTRAVENOUS at 05:44

## 2024-05-21 RX ADMIN — Medication 0.6 MILLIGRAM(S): at 05:42

## 2024-05-21 RX ADMIN — Medication 600 MILLIGRAM(S): at 05:43

## 2024-05-21 RX ADMIN — Medication 1 MILLIGRAM(S): at 11:03

## 2024-05-21 RX ADMIN — MORPHINE SULFATE 60 MILLIGRAM(S): 50 CAPSULE, EXTENDED RELEASE ORAL at 05:43

## 2024-05-21 RX ADMIN — Medication 2000 UNIT(S): at 11:03

## 2024-05-21 RX ADMIN — Medication 600 MILLIGRAM(S): at 17:48

## 2024-05-21 RX ADMIN — ENOXAPARIN SODIUM 110 MILLIGRAM(S): 100 INJECTION SUBCUTANEOUS at 03:48

## 2024-05-21 RX ADMIN — MORPHINE SULFATE 60 MILLIGRAM(S): 50 CAPSULE, EXTENDED RELEASE ORAL at 18:19

## 2024-05-21 RX ADMIN — Medication 1 MILLIGRAM(S): at 23:02

## 2024-05-21 RX ADMIN — AMPICILLIN SODIUM AND SULBACTAM SODIUM 200 GRAM(S): 250; 125 INJECTION, POWDER, FOR SUSPENSION INTRAMUSCULAR; INTRAVENOUS at 17:48

## 2024-05-21 RX ADMIN — Medication 0.6 MILLIGRAM(S): at 17:58

## 2024-05-21 RX ADMIN — AMPICILLIN SODIUM AND SULBACTAM SODIUM 200 GRAM(S): 250; 125 INJECTION, POWDER, FOR SUSPENSION INTRAMUSCULAR; INTRAVENOUS at 11:05

## 2024-05-21 RX ADMIN — PANTOPRAZOLE SODIUM 40 MILLIGRAM(S): 20 TABLET, DELAYED RELEASE ORAL at 05:43

## 2024-05-21 RX ADMIN — MORPHINE SULFATE 15 MILLIGRAM(S): 50 CAPSULE, EXTENDED RELEASE ORAL at 02:21

## 2024-05-21 NOTE — CONSULT NOTE ADULT - ASSESSMENT
Numerous underlying medical problems, most of them chronic and stable  Hx of hypercoagulation DVT, PE  Some form of CTD, possible SLE or other connective tissue disease  Pneumonitis, pleuritic chest pain  Possibility of pericarditis is debatable, but will do a period of therapy with Colchicine (chest pain is pleuritic, ECG is normal, symptoms responded to antibiotic and steroid)    Current therapy  Steroid Rx mainly for respiratory distress, wheezing, responded well  Colchicine 0.6 mg bid for 1 month if there is a real pericarditis (I doubt) for 3 months

## 2024-05-21 NOTE — PROGRESS NOTE ADULT - SUBJECTIVE AND OBJECTIVE BOX
24H events:    Patient is a 53y old Female who presents with a chief complaint of CAP (21 May 2024 07:51)    Primary diagnosis of Pneumonia      Day 1:  Day 2:  Day 3:     Today is hospital day 1d. This morning patient was seen and examined at bedside, resting comfortably in bed.    No acute or major events overnight.    Code Status:    Family communication:  Contact date:  Name of person contacted:  Relationship to patient:  Communication details:  What matters most:    PAST MEDICAL & SURGICAL HISTORY  Lupus    RA (rheumatoid arthritis)    HTN (hypertension)    CHF (congestive heart failure)    COPD (chronic obstructive pulmonary disease)    DVT (deep venous thrombosis)    Pulmonary embolism    History of laryngeal cancer    GERD (gastroesophageal reflux disease)    Cervical cancer    APS (antiphospholipid syndrome)    S/P appendectomy    S/P cholecystectomy    AICD (automatic cardioverter/defibrillator) present  [employer]    S/P hysterectomy    History of back surgery    H/O foot surgery    S/P eye surgery    Subclavian arterial stenosis      SOCIAL HISTORY:  Social History:      ALLERGIES:  Plaquenil (Other)  Avelox (Other)  Arava (Anaphylaxis; Angioedema)  adhesives (Rash; Urticaria)  Vantin (Other (Mild))    MEDICATIONS:  STANDING MEDICATIONS  ampicillin/sulbactam  IVPB      ampicillin/sulbactam  IVPB 3 Gram(s) IV Intermittent every 6 hours  budesonide  80 MICROgram(s)/formoterol 4.5 MICROgram(s) Inhaler 2 Puff(s) Inhalation two times a day  cholecalciferol 2000 Unit(s) Oral daily  colchicine 0.6 milliGRAM(s) Oral every 12 hours  doxycycline IVPB 100 milliGRAM(s) IV Intermittent every 12 hours  doxycycline IVPB      enoxaparin Injectable 110 milliGRAM(s) SubCutaneous every 24 hours  folic acid 1 milliGRAM(s) Oral daily  guaiFENesin  milliGRAM(s) Oral every 12 hours  metoprolol succinate  milliGRAM(s) Oral daily  morphine ER Tablet 60 milliGRAM(s) Oral four times a day  pantoprazole    Tablet 40 milliGRAM(s) Oral before breakfast  polyethylene glycol 3350 17 Gram(s) Oral daily  predniSONE   Tablet 40 milliGRAM(s) Oral daily  senna 2 Tablet(s) Oral at bedtime  tiotropium 2.5 MICROgram(s) Inhaler 2 Puff(s) Inhalation daily    PRN MEDICATIONS  acetaminophen     Tablet .. 650 milliGRAM(s) Oral every 6 hours PRN  albuterol    90 MICROgram(s) HFA Inhaler 2 Puff(s) Inhalation every 6 hours PRN  ALPRAZolam 1 milliGRAM(s) Oral four times a day PRN  aluminum hydroxide/magnesium hydroxide/simethicone Suspension 30 milliLiter(s) Oral every 4 hours PRN  diphenhydrAMINE Injectable 50 milliGRAM(s) IV Push once PRN  melatonin 3 milliGRAM(s) Oral at bedtime PRN  morphine  IR 15 milliGRAM(s) Oral every 4 hours PRN  ondansetron Injectable 4 milliGRAM(s) IV Push every 8 hours PRN    VITALS:   T(F): 98.5  HR: 80  BP: 152/71  RR: 18  SpO2: 96%    PHYSICAL EXAM:  GENERAL:   (  x) NAD, lying in bed comfortably     (  ) obtunded     (  ) lethargic     (  ) somnolent    HEAD:   ( x ) Atraumatic     (  ) hematoma     (  ) laceration (specify location:       )     NECK:  (x  ) Supple     (  ) neck stiffness     (  ) nuchal rigidity     (  )  no JVD     (  ) JVD present ( -- cm)    HEART:  Rate -->     (x  ) normal rate     (  ) bradycardic     (  ) tachycardic  Rhythm -->     (x  ) regular     (  ) regularly irregular     (  ) irregularly irregular  Murmurs -->     (  ) normal s1s2     (  ) systolic murmur     (  ) diastolic murmur     (  ) continuous murmur      (  ) S3 present     (  ) S4 present    LUNGS:   ( x )Unlabored respirations     (  ) tachypnea  ( x ) B/L air entry     (  ) decreased breath sounds in:  (location     )    (  ) no adventitious sound     (  ) crackles     (  ) wheezing      (  ) rhonchi      (specify location:       )  (  ) chest wall tenderness (specify location:       )    ABDOMEN:   (x  ) Soft     (  ) tense   |   (  ) nondistended     (  ) distended   |   (  ) +BS     (  ) hypoactive bowel sounds     (  ) hyperactive bowel sounds  ( x ) nontender     (  ) RUQ tenderness     (  ) RLQ tenderness     (  ) LLQ tenderness     (  ) epigastric tenderness     (  ) diffuse tenderness  (  ) Splenomegaly      (  ) Hepatomegaly      (  ) Jaundice     (  ) ecchymosis     EXTREMITIES:  ( x ) Normal     (  ) Rash     (  ) ecchymosis     (  ) varicose veins      (  ) pitting edema     (  ) non-pitting edema   (  ) ulceration     (  ) gangrene:     (location:     )    NERVOUS SYSTEM:    ( x ) A&Ox3     (  ) confused     (  ) lethargic  CN II-XII:     (  ) Intact     (  ) deficits found     (Specify:     )   Upper extremities:     (  ) no sensorimotor deficits     (  ) weakness     (  ) loss of proprioception/vibration     (  ) loss of touch/temperature (specify:    )  Lower extremities:     (  ) no sensorimotor deficits     (  ) weakness     (  ) loss of proprioception/vibration     (  ) loss of touch/temperature (specify:    )    SKIN:   ( x ) No rashes or lesions     (  ) maculopapular rash     (  ) pustules     (  ) vesicles     (  ) ulcer     (  ) ecchymosis     (specify location:     )    AMPAC score:    (  ) Indwelling Rodarte Catheter:   Date insterted:    Reason (  ) Critical illness     (  ) urinary retention    (  ) Accurate Ins/Outs Monitoring     (  ) CMO patient    (  ) Central Line:   Date inserted:  Location: (  ) Right IJ     (  ) Left IJ     (  ) Right Fem     (  ) Left Fem    (  ) SPC        (  ) pigtail       (  ) PEG tube       (  ) colostomy       (  ) jejunostomy  (  ) U-Dall    LABS:                        11.7   6.96  )-----------( 216      ( 21 May 2024 06:00 )             35.8     05-21    142  |  102  |  10  ----------------------------<  92  4.2   |  26  |  0.9    Ca    9.4      21 May 2024 06:00  Mg     1.6     05-21    TPro  6.1  /  Alb  3.6  /  TBili  0.3  /  DBili  x   /  AST  37  /  ALT  23  /  AlkPhos  96  05-21    PT/INR - ( 20 May 2024 01:38 )   PT: 11.60 sec;   INR: 1.02 ratio         PTT - ( 20 May 2024 01:38 )  PTT:54.7 sec  Urinalysis Basic - ( 21 May 2024 06:00 )    Color: x / Appearance: x / SG: x / pH: x  Gluc: 92 mg/dL / Ketone: x  / Bili: x / Urobili: x   Blood: x / Protein: x / Nitrite: x   Leuk Esterase: x / RBC: x / WBC x   Sq Epi: x / Non Sq Epi: x / Bacteria: x            Culture - Blood (collected 19 May 2024 19:59)  Source: .Blood Blood  Preliminary Report (21 May 2024 07:01):    No growth at 24 hours    Culture - Blood (collected 19 May 2024 19:59)  Source: .Blood Blood  Preliminary Report (21 May 2024 07:01):    No growth at 24 hours          RADIOLOGY:

## 2024-05-21 NOTE — PROGRESS NOTE ADULT - ASSESSMENT
52yo female with pmh  of HTN, HLD, COPD on home O2 5L, SLE, antiphospholipid syndrome,  rheumatoid arthritis, cervical cancer sp hysterectomy,  laryngeal cancer s/p CT, RT, HFpEF (06/21/19 EF 63%) s/p AICD+PPM, DVT/PE (on lovenox QD),  NSTEMI in 2015, subclavian stenosis s/p stent placement presenting for pneumonia.    #LLL Bacterial Pneumonia with hypoxia, resolved  #H/o COPD   -CT chest - New partial opacification of the left lower lobe central bronchi and lower lobe consolidation likely representing pneumonia/atelectasis. Recommend follow-up CT chest in 6/8 weeks to assess for resolution.  -c/w Unasyn and doxycycline per ID  (allergic to vantin and moxifloxacin)   -blood cultures NTD  -sputum culture pending  -MRSA nares negative  -procal 0.06  -pulm consulted appreciated  -c/w mucinex bid  -c/w prednisone 40mg qd x 5 days, today is day 2 - f/u cardio for prednisone dosing in case of pericarditis   -c/w albuterol, symbicort inhalers     #Mild-to-moderate pericardial effusion, new, likely 2/2 pericarditis  -CT with new mild-to-moderate pericardial effusion  -trop negative  -ESR 71, CRP 87.5  -coxsackie pending  -TTE pending  -f/u cardio consult Dr. Magana   -started on prednisone 40mg x 5 days (today is day 2) - f/u cardio about dosing if they want a quick taper due to high risk of recurrence in pericarditis   -started on colchicine 0.6mg q12h   -no ibuprofen per rheum     #SLE  #rheumatoid arthritis   #antiphospholipid syndrom  #DVT/PE  -YOON was positive but ds DNA was negative   -last note by heme onc plan was for warfarin with target INR 3-4 and aspirin 81mg po daily that was switch to lovenox due to cerebral bleeding   -f/u labs: YOON, dsDNA, C3, C4, lupus profile   -CT chest with Right lower lobe segmental pulmonary embolism with calcifications with similar appearance to chest CTA dated 6/24/2022 likely reflecting chronic PE.  -C/w lovenox home dose 110mg QD (home dose 1.5mg/kg daily)   -Rheumatology consult: pending     #HFpEF s/p AICD+PPM   -TTE 06/21/19 EF 63%  -f/u TTE this admission   - C/w metoprolol 100mg QD    #NSTEMI in 2015, subclavian stenosis s/p stent placement   -No longer on ASA 2/2 GI bleed    #Cervical cancer /sp hysterectomy  #Laryngeal cancer s/p chemo, in remission  -outpatient followup     #Chronic pain   -c/w morphine:  morphine ER 60mg 4 times a day standing  morphine IR 15mg q4h PRN     Misc:  - DVT Prophylaxis: Lovenox   - GI Prophylaxis: PPI   - Diet: Diet, DASH/TLC   52yo female with pmh  of HTN, HLD, COPD on home O2 5L, SLE, antiphospholipid syndrome,  rheumatoid arthritis, cervical cancer sp hysterectomy,  laryngeal cancer s/p CT, RT, HFpEF (06/21/19 EF 63%) s/p AICD+PPM, DVT/PE (on lovenox QD),  NSTEMI in 2015, subclavian stenosis s/p stent placement presenting for pneumonia.    #LLL Bacterial Pneumonia with hypoxia, resolved  #H/o COPD   -CT chest - New partial opacification of the left lower lobe central bronchi and lower lobe consolidation likely representing pneumonia/atelectasis. Recommend follow-up CT chest in 6/8 weeks to assess for resolution.  -c/w Unasyn and doxycycline per ID  (allergic to vantin and moxifloxacin)   -blood cultures NTD  -sputum culture pending  -MRSA nares negative  -procal 0.06  -pulm consulted appreciated  -c/w mucinex bid  -c/w prednisone 40mg qd x 5 days, today is day 2 - f/u cardio for prednisone dosing in case of pericarditis   -c/w albuterol, symbicort inhalers     #Mild-to-moderate pericardial effusion, new, likely 2/2 pericarditis  -CT with new mild-to-moderate pericardial effusion  -trop negative  -ESR 71, CRP 87.5  -coxsackie pending  -TTE pending  -f/u cardio consult Dr. Magana   -started on prednisone 40mg x 5 days (today is day 2) - f/u cardio about dosing/taper for high risk of recurrence in pericarditis   -started on colchicine 0.6mg q12h   -no ibuprofen per rheum     #SLE  #rheumatoid arthritis   #antiphospholipid syndrom  #DVT/PE  -YOON was positive but ds DNA was negative   -last note by heme onc plan was for warfarin with target INR 3-4 and aspirin 81mg po daily that was switch to lovenox due to cerebral bleeding   -f/u labs: YOON, dsDNA, C3, C4, lupus profile   -CT chest with Right lower lobe segmental pulmonary embolism with calcifications with similar appearance to chest CTA dated 6/24/2022 likely reflecting chronic PE.  -C/w lovenox home dose 110mg QD (home dose 1.5mg/kg daily)   -Rheumatology consult: pending     #HFpEF s/p AICD+PPM   -TTE 06/21/19 EF 63%  -f/u TTE this admission   - C/w metoprolol 100mg QD    #NSTEMI in 2015, subclavian stenosis s/p stent placement   -No longer on ASA 2/2 GI bleed    #Cervical cancer /sp hysterectomy  #Laryngeal cancer s/p chemo, in remission  -outpatient followup     #Chronic pain   -c/w morphine:  morphine ER 60mg 4 times a day standing  morphine IR 15mg q4h PRN     Misc:  - DVT Prophylaxis: Lovenox   - GI Prophylaxis: PPI   - Diet: Diet, DASH/TLC

## 2024-05-21 NOTE — CONSULT NOTE ADULT - SUBJECTIVE AND OBJECTIVE BOX
Patient is a 53y old  Female who presents with a chief complaint of CAP (21 May 2024 11:35)      HPI:  Known patient to me with extensive HX of malignancy, COPD, chronic Asthma and wheezing, Left subclavian vein thrombosis (due to PM wire) resulted in SC vein stent (not artery) and rest of the disorders as bellow presented with sob, hypoxia, chest xray suggestive of LLL pneumonitis and along with a typical pleuritic chest pain (positional, localized and exacerbated by deep inspiration or cough), accidentally was found to have a small pericardial effusion, normal ECG (not suggestive of pericarditis), but Colchicine already was started along with Steroid, respiratory distress and hypoxia responded well to steroid and antibiotic.  She still complains of pain and asking for some narcotic analgesics.    Pt is a 54 y/o F PMHx  of HTN, HLD, COPD on home O2, SLE, lupus anticoagulant, antiphospholipid syndrome,  rheumatoid arthritis, cervical cancer sp hysterectomy,  laryngeal cancer s/p CT, RT, HFpEF (06/21/19 EF 63%) s/p AICD+PPM, DVT/PE (on lovenox QD),  NSTEMI in 2015, subclavian vein thrombosis causing venous stenosis s/p venous stent placement presenting for fever and pleuritic chest pain. Pt reports on Wed she woke up with pleuritic chest pain and increased SOB, She reports she has required her home O2 use more frequently as well. Reports TMax 104 and went to urgent care on Friday where she was given Augmentin. When her symptoms did not improve she presented to the ED.    /81 HR 93 RR 19 T98.4F Sat 98% on 3L O2    CT CHEST PE New partial opacification of the left lower lobe central bronchi and lower lobe consolidation likely representing pneumonia/atelectasis. Recommend follow-up CT chest in 6/8 weeks to assess for resolution.    Mild to moderate pericardial effusion, new.    Admitted to medicine   (20 May 2024 03:44)      PAST MEDICAL & SURGICAL HISTORY:  Lupus      RA (rheumatoid arthritis)      HTN (hypertension)      CHF (congestive heart failure)      COPD (chronic obstructive pulmonary disease)      DVT (deep venous thrombosis)      Pulmonary embolism      History of laryngeal cancer      GERD (gastroesophageal reflux disease)      Cervical cancer      APS (antiphospholipid syndrome)      S/P appendectomy      S/P cholecystectomy      AICD (automatic cardioverter/defibrillator) present  [employer]      S/P hysterectomy      History of back surgery      H/O foot surgery      S/P eye surgery      Subclavian arterial stenosis          PREVIOUS DIAGNOSTIC TESTING:      ECHO  FINDINGS:   < from: TTE Echo Complete w/ Contrast w/o Doppler (05.21.24 @ 12:40) >   1. Normal global left ventricular systolic function.   2. LV Ejection Fraction by Hendricks's Method with a biplane EF of 64 %.   3. Normal left ventricular internal cavity size.   4. Normal right ventricular size and function.   5. No significant valve disease.   6. Small pericardial effusion. No echocardiographic evidence of   tamponade.   7. Compared to study dated 6/15/23, there is a pericardial effusion on   current study.    PHYSICIAN INTERPRETATION:  Left Ventricle: The left ventricular internal cavity size is normal.   Global LV systolic function was normal. Spectral Doppler shows normal   pattern of LV diastolic filling.    < end of copied text >      MEDICATIONS  (STANDING):  ampicillin/sulbactam  IVPB      ampicillin/sulbactam  IVPB 3 Gram(s) IV Intermittent every 6 hours  budesonide  80 MICROgram(s)/formoterol 4.5 MICROgram(s) Inhaler 2 Puff(s) Inhalation two times a day  cholecalciferol 2000 Unit(s) Oral daily  colchicine 0.6 milliGRAM(s) Oral every 12 hours  doxycycline IVPB      doxycycline IVPB 100 milliGRAM(s) IV Intermittent every 12 hours  enoxaparin Injectable 110 milliGRAM(s) SubCutaneous every 24 hours  folic acid 1 milliGRAM(s) Oral daily  guaiFENesin  milliGRAM(s) Oral every 12 hours  metoprolol succinate  milliGRAM(s) Oral daily  morphine ER Tablet 60 milliGRAM(s) Oral four times a day  naloxegol 25 milliGRAM(s) Oral daily  pantoprazole    Tablet 40 milliGRAM(s) Oral before breakfast  polyethylene glycol 3350 17 Gram(s) Oral daily  predniSONE   Tablet 40 milliGRAM(s) Oral daily  senna 2 Tablet(s) Oral at bedtime  tiotropium 2.5 MICROgram(s) Inhaler 2 Puff(s) Inhalation daily    MEDICATIONS  (PRN):  acetaminophen     Tablet .. 650 milliGRAM(s) Oral every 6 hours PRN Temp greater or equal to 38C (100.4F), Mild Pain (1 - 3)  albuterol    90 MICROgram(s) HFA Inhaler 2 Puff(s) Inhalation every 6 hours PRN Shortness of Breath and/or Wheezing  ALPRAZolam 1 milliGRAM(s) Oral every 6 hours PRN Anxiety  aluminum hydroxide/magnesium hydroxide/simethicone Suspension 30 milliLiter(s) Oral every 4 hours PRN Dyspepsia  diphenhydrAMINE Injectable 50 milliGRAM(s) IV Push once PRN Allergy symptoms  melatonin 3 milliGRAM(s) Oral at bedtime PRN Insomnia  morphine  IR 15 milliGRAM(s) Oral every 4 hours PRN Severe Pain (7 - 10)  ondansetron Injectable 4 milliGRAM(s) IV Push every 8 hours PRN Nausea and/or Vomiting      FAMILY HISTORY:  Family history of cervical cancer    FH: thyroid cancer        SOCIAL HISTORY:  CIGARETTES: ex smoker  ALCOHOL: no  DRUGS: no street drugs but has been on different narcotics quite frequently                      REVIEW OF SYSTEMS:  CONSTITUTIONAL: Sick looking  NECK: No pain   RESPIRATORY: Cough, wheezing, shortness of breath  CARDIOVASCULAR: Chest pain, SOB, No palpitations, No leg swelling  GASTROINTESTINAL: No abdominal or epigastric pain. No nausea, vomiting, or hematemesis  NEUROLOGICAL: No dizziness, headaches  SKIN: No itching, burning, rashes, or lesions   MUSCULOSKELETAL: joint pain, No  swelling;   PSYCHIATRIC: Some depression, anxiety  ALLERGY: No hives, itching, rash          Vital Signs Last 24 Hrs  T(C): 36.6 (21 May 2024 21:00), Max: 36.9 (21 May 2024 01:05)  T(F): 97.8 (21 May 2024 21:00), Max: 98.5 (21 May 2024 01:05)  HR: 77 (21 May 2024 21:00) (77 - 86)  BP: 130/56 (21 May 2024 21:00) (128/74 - 152/71)  BP(mean): --  RR: 18 (21 May 2024 21:00) (18 - 18)  SpO2: 94% (21 May 2024 21:00) (94% - 98%)    Parameters below as of 21 May 2024 21:00  Patient On (Oxygen Delivery Method): room air                          PHYSICAL EXAM:  GENERAL: No more respiratory distress, but sick looking  HEAD:  Atraumatic, Normocephalic  NECK: Supple, JVD, No Bruit   NERVOUS SYSTEM:  Alert, Awake, Oriented to time, place, person; Normal memory coarse voice chronically   CHEST/LUNG: Normal air entry to lung base bilaterally; wheeze, No crackle,   HEART: Regular heart beat, S1, A2, P2, No S3, No gallop, No murmur  ABDOMEN: Soft, Non tender, Non distended; Bowel sounds present  EXTREMITIES:  1+ Peripheral Pulses, No clubbing, No edema  SKIN: No rashes or lesions      ECG: < from: 12 Lead ECG (05.19.24 @ 19:53) >   Normal sinus rhythm  Low voltage QRS  Nonspecific T wave abnormality    < end of copied text >      I&O's Detail      LABS:                        11.7   6.96  )-----------( 216      ( 21 May 2024 06:00 )             35.8     05-21    142  |  102  |  10  ----------------------------<  92  4.2   |  26  |  0.9    Ca    9.4      21 May 2024 06:00  Mg     1.6     05-21    TPro  6.1  /  Alb  3.6  /  TBili  0.3  /  DBili  x   /  AST  37  /  ALT  23  /  AlkPhos  96  05-21      PT/INR - ( 20 May 2024 01:38 )   PT: 11.60 sec;   INR: 1.02 ratio         PTT - ( 21 May 2024 11:50 )  PTT:60.4 sec  Urinalysis Basic - ( 21 May 2024 06:00 )    Color: x / Appearance: x / SG: x / pH: x  Gluc: 92 mg/dL / Ketone: x  / Bili: x / Urobili: x   Blood: x / Protein: x / Nitrite: x   Leuk Esterase: x / RBC: x / WBC x   Sq Epi: x / Non Sq Epi: x / Bacteria: x      I&O's Summary      RADIOLOGY & ADDITIONAL STUDIES: < from: Xray Chest 1 View- PORTABLE-Routine (Xray Chest 1 View- PORTABLE-Routine in AM.) (05.21.24 @ 05:15) >  Cardiomegaly with retrocardiac opacification. Support devices as   described. Stable.    < end of copied text >  < from: Xray Chest 1 View-PORTABLE IMMEDIATE (05.20.24 @ 00:05) >  Cardiac/mediastinum/hilum: Left-sided AICD and subclavian stents.   Cardiomegaly.    Lung parenchyma/Pleura: Left lower lung zone opacity/effusion. No   pneumothorax. Clear right lung..    Skeleton/soft tissues: No significant osseous abnormality is seen.      Impression:      Left lower lung lung zone opacity    < end of copied text >

## 2024-05-21 NOTE — PROGRESS NOTE ADULT - SUBJECTIVE AND OBJECTIVE BOX
ILEANA MCNEIL  53y  [facility]      Patient is a 53y old  Female who presents with a chief complaint of CAP (20 May 2024 17:34)      INTERVAL HPI/OVERNIGHT EVENTS:        REVIEW OF SYSTEMS:        FAMILY HISTORY:  Family history of cervical cancer    FH: thyroid cancer      T(C): 36.9 (05-21-24 @ 05:00), Max: 36.9 (05-21-24 @ 01:05)  HR: 80 (05-21-24 @ 05:00) (79 - 84)  BP: 152/71 (05-21-24 @ 05:00) (115/74 - 152/71)  RR: 18 (05-21-24 @ 05:00) (18 - 18)  SpO2: 94% (05-21-24 @ 05:00) (94% - 99%)  Wt(kg): --Vital Signs Last 24 Hrs  T(C): 36.9 (21 May 2024 05:00), Max: 36.9 (21 May 2024 01:05)  T(F): 98.5 (21 May 2024 05:00), Max: 98.5 (21 May 2024 01:05)  HR: 80 (21 May 2024 05:00) (79 - 84)  BP: 152/71 (21 May 2024 05:00) (115/74 - 152/71)  BP(mean): 88 (20 May 2024 15:42) (88 - 88)  RR: 18 (21 May 2024 05:00) (18 - 18)  SpO2: 94% (21 May 2024 05:00) (94% - 99%)    Parameters below as of 21 May 2024 01:05  Patient On (Oxygen Delivery Method): nasal cannula  O2 Flow (L/min): 4      PHYSICAL EXAM:  GENERAL: NAD, well-groomed, well-developed  HEAD:  Atraumatic, Normocephalic  EYES: EOMI, PERRLA, conjunctiva and sclera clear  ENMT: No tonsillar erythema, exudates, or enlargement; Moist mucous membranes, Good dentition, No lesions  NECK: Supple, No JVD, Normal thyroid  NERVOUS SYSTEM:  Alert & Oriented X3, Good concentration; Motor Strength 5/5 B/L upper and lower extremities; DTRs 2+ intact and symmetric  PULM: Clear to auscultation bilaterally  CARDIAC: Regular rate and rhythm; No murmurs, rubs, or gallops  GI: Soft, Nontender, Nondistended; Bowel sounds present  EXTREMITIES:  2+ Peripheral Pulses, No clubbing, cyanosis, or edema  LYMPH: No lymphadenopathy noted  SKIN: No rashes or lesions    Consultant(s) Notes Reviewed:  [x ] YES  [ ] NO  Care Discussed with Consultants/Other Providers [ x] YES  [ ] NO    LABS:                            11.7   6.96  )-----------( 216      ( 21 May 2024 06:00 )             35.8   05-20    137  |  100  |  11  ----------------------------<  100<H>  3.8   |  28  |  1.0    Ca    9.3      20 May 2024 06:31  Mg     1.7     05-20    TPro  6.3  /  Alb  3.6  /  TBili  0.2  /  DBili  x   /  AST  7   /  ALT  9   /  AlkPhos  79  05-20            Culture - Blood (collected 19 May 2024 19:59)  Source: .Blood Blood  Preliminary Report (21 May 2024 07:01):    No growth at 24 hours    Culture - Blood (collected 19 May 2024 19:59)  Source: .Blood Blood  Preliminary Report (21 May 2024 07:01):    No growth at 24 hours      acetaminophen     Tablet .. 650 milliGRAM(s) Oral every 6 hours PRN  albuterol    90 MICROgram(s) HFA Inhaler 2 Puff(s) Inhalation every 6 hours PRN  ALPRAZolam 1 milliGRAM(s) Oral four times a day PRN  aluminum hydroxide/magnesium hydroxide/simethicone Suspension 30 milliLiter(s) Oral every 4 hours PRN  ampicillin/sulbactam  IVPB 3 Gram(s) IV Intermittent every 6 hours  ampicillin/sulbactam  IVPB      budesonide  80 MICROgram(s)/formoterol 4.5 MICROgram(s) Inhaler 2 Puff(s) Inhalation two times a day  cholecalciferol 2000 Unit(s) Oral daily  colchicine 0.6 milliGRAM(s) Oral every 12 hours  diphenhydrAMINE Injectable 50 milliGRAM(s) IV Push once PRN  doxycycline IVPB      doxycycline IVPB 100 milliGRAM(s) IV Intermittent every 12 hours  enoxaparin Injectable 110 milliGRAM(s) SubCutaneous every 24 hours  folic acid 1 milliGRAM(s) Oral daily  guaiFENesin  milliGRAM(s) Oral every 12 hours  ibuprofen  Tablet. 600 milliGRAM(s) Oral every 8 hours PRN  melatonin 3 milliGRAM(s) Oral at bedtime PRN  metoprolol succinate  milliGRAM(s) Oral daily  morphine  IR 15 milliGRAM(s) Oral two times a day PRN  morphine ER Tablet 30 milliGRAM(s) Oral <User Schedule> PRN  morphine ER Tablet 60 milliGRAM(s) Oral <User Schedule>  ondansetron Injectable 4 milliGRAM(s) IV Push every 8 hours PRN  pantoprazole    Tablet 40 milliGRAM(s) Oral before breakfast  predniSONE   Tablet 40 milliGRAM(s) Oral daily  tiotropium 2.5 MICROgram(s) Inhaler 2 Puff(s) Inhalation daily      Pt is a 52 y/o F PMHx  of HTN, HLD, COPD on home O2 5L, SLE, antiphospholipid syndrome,  rheumatoid arthritis, cervical cancer sp hysterectomy,  laryngeal cancer s/p CT, RT, HFpEF (06/21/19 EF 63%) s/p AICD+PPM, DVT/PE (on lovenox QD),  NSTEMI in 2015, subclavian stenosis s/p stent placement presenting for pneumonia.    1. LL Bacterial Pneumonia with hypoxia resolved . hx of COPD   - Admit to medicine                  - Switch from zosyn to unasyn and doxycycline (allergic to vantin and moxifloxacin)   - Blood cx:pending                             - Sputum cx:pending              - MRSA;pending            - Procalcitonin:pending    - Mucinex bid   - C/w supplemental O2, titrate PRN O2 sat goal  88 - 92%  - Cont Prednisone 40mg daily for 5 days (might need longer taper course in case of pericarditis)  d:2   - Cont inhalers   - CT chest:  a) Right lower lobe segmental pulmonary embolism with calcifications with similar appearance to chest CTA dated 6/24/2022 likely reflecting chronic PE.  b) New partial opacification of the left lower lobe central bronchi and lower lobe consolidation likely representing pneumonia/atelectasis. Recommend follow-up CT chest in 6/8 weeks to assess for resolution.  c) Mild to moderate pericardial effusion, new.  - ID consult appreciated   - Pulmonary consult appreciated     2. New mild-mod pericardial effusion Likely cause of pleuritic chest pain likely due to acute pericarditis   - ESR:elevated            - CRP:elevated            - Coxasacki:pending   - Trop negative*2   - Echocardio:pending  - Started on prednisone for pneumonia that will help in  pericarditis (awaiting cardio but they might ask for quick taper due to high risk of recurrence in pericarditis)   - Cont colchicine d:2   - Ibuprofen prn (no clear hx of cad)   - Cardio consult :pending      3. SLE /RA/ APLS/DVT/PE  * YOON was positive but ds DNA was negative   * last note by heme onc plan was for warfarin with target INR 3-4 and aspirin 81mg po daily that was switch to lovenox due to cerebral bleeding   - YOON:pending       - ds DNA:pending      - C3:pending        - C4:pending   - C/w lovenox home dose 110mg QD (home dose 1.5mg/kg daily)   - Rheumatology consult:pending     4. NSTEMI in 2015, subclavian stenosis s/p stent placement  - No longer on ASA 2/2 GI bleed    5. Cervical cancer sp hysterectomy,  laryngeal cancer s/p CT on remission  - Follow up as outpatient     6. HFpEF (06/21/19 EF 63%) s/p AICD+PPM   - C/w metoprolol 100mg QD    7. Chronic pain   - Cont morphine       # Misc  - DVT Prophylaxis: enoxaparin  - GI Prophylaxis PPI   - Diet: Diet, DASH/TLC  - Dispo: Acute.      ILEANA MCNEIL  53y  [facility]      Patient is a 53y old  Female who presents with a chief complaint of CAP (20 May 2024 17:34)      INTERVAL HPI/OVERNIGHT EVENTS:    Patient still with chest pain  pt already has chronic pain and was not getting the right dose of her pain meds  cough also reported.no other complains   no fever overnighjt        FAMILY HISTORY:  Family history of cervical cancer    FH: thyroid cancer      T(C): 36.9 (05-21-24 @ 05:00), Max: 36.9 (05-21-24 @ 01:05)  HR: 80 (05-21-24 @ 05:00) (79 - 84)  BP: 152/71 (05-21-24 @ 05:00) (115/74 - 152/71)  RR: 18 (05-21-24 @ 05:00) (18 - 18)  SpO2: 94% (05-21-24 @ 05:00) (94% - 99%)  Wt(kg): --Vital Signs Last 24 Hrs  T(C): 36.9 (21 May 2024 05:00), Max: 36.9 (21 May 2024 01:05)  T(F): 98.5 (21 May 2024 05:00), Max: 98.5 (21 May 2024 01:05)  HR: 80 (21 May 2024 05:00) (79 - 84)  BP: 152/71 (21 May 2024 05:00) (115/74 - 152/71)  BP(mean): 88 (20 May 2024 15:42) (88 - 88)  RR: 18 (21 May 2024 05:00) (18 - 18)  SpO2: 94% (21 May 2024 05:00) (94% - 99%)    Parameters below as of 21 May 2024 01:05  Patient On (Oxygen Delivery Method): nasal cannula  O2 Flow (L/min): 4      PHYSICAL EXAM:  GENERAL: NAD, well-groomed, well-developed  NERVOUS SYSTEM:  Alert & Oriented X3,  PULM: crackles  CARDIAC: Regular rate and rhythm; No murmurs, rubs, or gallops  GI: Soft, Nontender, Nondistended; Bowel sounds present  EXTREMITIES:  2+ Peripheral Pulses,    Consultant(s) Notes Reviewed:  [x ] YES  [ ] NO  Care Discussed with Consultants/Other Providers [ x] YES  [ ] NO    LABS:                            11.7   6.96  )-----------( 216      ( 21 May 2024 06:00 )             35.8   05-20    137  |  100  |  11  ----------------------------<  100<H>  3.8   |  28  |  1.0    Ca    9.3      20 May 2024 06:31  Mg     1.7     05-20    TPro  6.3  /  Alb  3.6  /  TBili  0.2  /  DBili  x   /  AST  7   /  ALT  9   /  AlkPhos  79  05-20            Culture - Blood (collected 19 May 2024 19:59)  Source: .Blood Blood  Preliminary Report (21 May 2024 07:01):    No growth at 24 hours    Culture - Blood (collected 19 May 2024 19:59)  Source: .Blood Blood  Preliminary Report (21 May 2024 07:01):    No growth at 24 hours      acetaminophen     Tablet .. 650 milliGRAM(s) Oral every 6 hours PRN  albuterol    90 MICROgram(s) HFA Inhaler 2 Puff(s) Inhalation every 6 hours PRN  ALPRAZolam 1 milliGRAM(s) Oral four times a day PRN  aluminum hydroxide/magnesium hydroxide/simethicone Suspension 30 milliLiter(s) Oral every 4 hours PRN  ampicillin/sulbactam  IVPB 3 Gram(s) IV Intermittent every 6 hours  ampicillin/sulbactam  IVPB      budesonide  80 MICROgram(s)/formoterol 4.5 MICROgram(s) Inhaler 2 Puff(s) Inhalation two times a day  cholecalciferol 2000 Unit(s) Oral daily  colchicine 0.6 milliGRAM(s) Oral every 12 hours  diphenhydrAMINE Injectable 50 milliGRAM(s) IV Push once PRN  doxycycline IVPB      doxycycline IVPB 100 milliGRAM(s) IV Intermittent every 12 hours  enoxaparin Injectable 110 milliGRAM(s) SubCutaneous every 24 hours  folic acid 1 milliGRAM(s) Oral daily  guaiFENesin  milliGRAM(s) Oral every 12 hours  ibuprofen  Tablet. 600 milliGRAM(s) Oral every 8 hours PRN  melatonin 3 milliGRAM(s) Oral at bedtime PRN  metoprolol succinate  milliGRAM(s) Oral daily  morphine  IR 15 milliGRAM(s) Oral two times a day PRN  morphine ER Tablet 30 milliGRAM(s) Oral <User Schedule> PRN  morphine ER Tablet 60 milliGRAM(s) Oral <User Schedule>  ondansetron Injectable 4 milliGRAM(s) IV Push every 8 hours PRN  pantoprazole    Tablet 40 milliGRAM(s) Oral before breakfast  predniSONE   Tablet 40 milliGRAM(s) Oral daily  tiotropium 2.5 MICROgram(s) Inhaler 2 Puff(s) Inhalation daily      Pt is a 54 y/o F PMHx  of HTN, HLD, COPD on home O2 5L, SLE, antiphospholipid syndrome,  rheumatoid arthritis, cervical cancer sp hysterectomy,  laryngeal cancer s/p CT, RT, HFpEF (06/21/19 EF 63%) s/p AICD+PPM, DVT/PE (on lovenox QD),  NSTEMI in 2015, subclavian stenosis s/p stent placement presenting for pneumonia.    1. LL Bacterial Pneumonia with hypoxia resolved . hx of COPD   - Admit to medicine                  - Switch from zosyn to unasyn and doxycycline (allergic to vantin and moxifloxacin)   - Blood cx:pending                             - Sputum cx:pending              - MRSA;pending            - Procalcitonin:pending    - Mucinex bid   - C/w supplemental O2, titrate PRN O2 sat goal  88 - 92%  - Cont Prednisone 40mg daily  d:2   - Cont inhalers   - CT chest:  a) Right lower lobe segmental pulmonary embolism with calcifications with similar appearance to chest CTA dated 6/24/2022 likely reflecting chronic PE.  b) New partial opacification of the left lower lobe central bronchi and lower lobe consolidation likely representing pneumonia/atelectasis. Recommend follow-up CT chest in 6/8 weeks to assess for resolution.  c) Mild to moderate pericardial effusion, new.  - ID consult appreciated   - Pulmonary consult appreciated     2. New mild-mod pericardial effusion Likely cause of pleuritic chest pain likely due to acute pericarditis   - ESR:elevated            - CRP:elevated            - Coxasacki:pending   - Trop negative*2   - Echocardio:pending  - Started on prednisone for pneumonia that will help in  pericarditis (need slow taper 10mg every week to prevent recurrence )   - Cont colchicine d:2   - Started on Ibuprofen prn that was stopped ([facility] recommend against NSAID due to her PMHx)   - Cardio consult :pending      3. SLE /RA/ APLS/DVT/PE  * YOON was positive but ds DNA was negative   * last note by heme onc plan was for warfarin with target INR 3-4 and aspirin 81mg po daily that was switch to lovenox due to cerebral bleeding   - YOON:pending       - ds DNA:pending      - C3:pending        - C4:pending   - C/w lovenox home dose 110mg QD (home dose 1.5mg/kg daily)   - Rheumatology consult:pending     4. NSTEMI in 2015, subclavian stenosis s/p stent placement  - No longer on ASA 2/2 GI bleed    5. Cervical cancer sp hysterectomy,  laryngeal cancer s/p CT on remission  - Follow up as outpatient     6. HFpEF (06/21/19 EF 63%) s/p AICD+PPM   - C/w metoprolol 100mg QD    7. Chronic pain   - Cont morphine       # Misc  - DVT Prophylaxis: enoxaparin  - GI Prophylaxis PPI   - Diet: Diet, DASH/TLC  - Dispo: Acute.      ILEANA MCNEIL  53y  [facility]      Patient is a 53y old  Female who presents with a chief complaint of CAP (20 May 2024 17:34)      INTERVAL HPI/OVERNIGHT EVENTS:    Patient still with chest pain  pt already has chronic pain and was not getting the right dose of her pain meds  cough also reported.no other complains   no fever overnighjt        FAMILY HISTORY:  Family history of cervical cancer    FH: thyroid cancer      T(C): 36.9 (05-21-24 @ 05:00), Max: 36.9 (05-21-24 @ 01:05)  HR: 80 (05-21-24 @ 05:00) (79 - 84)  BP: 152/71 (05-21-24 @ 05:00) (115/74 - 152/71)  RR: 18 (05-21-24 @ 05:00) (18 - 18)  SpO2: 94% (05-21-24 @ 05:00) (94% - 99%)  Wt(kg): --Vital Signs Last 24 Hrs  T(C): 36.9 (21 May 2024 05:00), Max: 36.9 (21 May 2024 01:05)  T(F): 98.5 (21 May 2024 05:00), Max: 98.5 (21 May 2024 01:05)  HR: 80 (21 May 2024 05:00) (79 - 84)  BP: 152/71 (21 May 2024 05:00) (115/74 - 152/71)  BP(mean): 88 (20 May 2024 15:42) (88 - 88)  RR: 18 (21 May 2024 05:00) (18 - 18)  SpO2: 94% (21 May 2024 05:00) (94% - 99%)    Parameters below as of 21 May 2024 01:05  Patient On (Oxygen Delivery Method): nasal cannula  O2 Flow (L/min): 4      PHYSICAL EXAM:  GENERAL: NAD, well-groomed, well-developed  NERVOUS SYSTEM:  Alert & Oriented X3,  PULM: crackles  CARDIAC: Regular rate and rhythm; No murmurs, rubs, or gallops  GI: Soft, Nontender, Nondistended; Bowel sounds present  EXTREMITIES:  2+ Peripheral Pulses,    Consultant(s) Notes Reviewed:  [x ] YES  [ ] NO  Care Discussed with Consultants/Other Providers [ x] YES  [ ] NO    LABS:                            11.7   6.96  )-----------( 216      ( 21 May 2024 06:00 )             35.8   05-20    137  |  100  |  11  ----------------------------<  100<H>  3.8   |  28  |  1.0    Ca    9.3      20 May 2024 06:31  Mg     1.7     05-20    TPro  6.3  /  Alb  3.6  /  TBili  0.2  /  DBili  x   /  AST  7   /  ALT  9   /  AlkPhos  79  05-20            Culture - Blood (collected 19 May 2024 19:59)  Source: .Blood Blood  Preliminary Report (21 May 2024 07:01):    No growth at 24 hours    Culture - Blood (collected 19 May 2024 19:59)  Source: .Blood Blood  Preliminary Report (21 May 2024 07:01):    No growth at 24 hours      acetaminophen     Tablet .. 650 milliGRAM(s) Oral every 6 hours PRN  albuterol    90 MICROgram(s) HFA Inhaler 2 Puff(s) Inhalation every 6 hours PRN  ALPRAZolam 1 milliGRAM(s) Oral four times a day PRN  aluminum hydroxide/magnesium hydroxide/simethicone Suspension 30 milliLiter(s) Oral every 4 hours PRN  ampicillin/sulbactam  IVPB 3 Gram(s) IV Intermittent every 6 hours  ampicillin/sulbactam  IVPB      budesonide  80 MICROgram(s)/formoterol 4.5 MICROgram(s) Inhaler 2 Puff(s) Inhalation two times a day  cholecalciferol 2000 Unit(s) Oral daily  colchicine 0.6 milliGRAM(s) Oral every 12 hours  diphenhydrAMINE Injectable 50 milliGRAM(s) IV Push once PRN  doxycycline IVPB      doxycycline IVPB 100 milliGRAM(s) IV Intermittent every 12 hours  enoxaparin Injectable 110 milliGRAM(s) SubCutaneous every 24 hours  folic acid 1 milliGRAM(s) Oral daily  guaiFENesin  milliGRAM(s) Oral every 12 hours  ibuprofen  Tablet. 600 milliGRAM(s) Oral every 8 hours PRN  melatonin 3 milliGRAM(s) Oral at bedtime PRN  metoprolol succinate  milliGRAM(s) Oral daily  morphine  IR 15 milliGRAM(s) Oral two times a day PRN  morphine ER Tablet 30 milliGRAM(s) Oral <User Schedule> PRN  morphine ER Tablet 60 milliGRAM(s) Oral <User Schedule>  ondansetron Injectable 4 milliGRAM(s) IV Push every 8 hours PRN  pantoprazole    Tablet 40 milliGRAM(s) Oral before breakfast  predniSONE   Tablet 40 milliGRAM(s) Oral daily  tiotropium 2.5 MICROgram(s) Inhaler 2 Puff(s) Inhalation daily      Pt is a 54 y/o F PMHx  of HTN, HLD, COPD on home O2 5L, SLE, antiphospholipid syndrome,  rheumatoid arthritis, cervical cancer sp hysterectomy,  laryngeal cancer s/p CT, RT, HFpEF (06/21/19 EF 63%) s/p AICD+PPM, DVT/PE (on lovenox QD),  NSTEMI in 2015, subclavian stenosis s/p stent placement presenting for pneumonia.    1. LL Bacterial Pneumonia with hypoxia resolved . hx of COPD   - Admit to medicine                  - Switch from zosyn to unasyn and doxycycline (allergic to vantin and moxifloxacin)   - Blood cx:NTD                - Sputum cx:pending              - MRSA:negative           - Procalcitonin:0.06  - Mucinex bid   - C/w supplemental O2, titrate PRN O2 sat goal  88 - 92%  - Cont Prednisone 40mg daily  d:2   - Cont inhalers   - CT chest:  a) Right lower lobe segmental pulmonary embolism with calcifications with similar appearance to chest CTA dated 6/24/2022 likely reflecting chronic PE.  b) New partial opacification of the left lower lobe central bronchi and lower lobe consolidation likely representing pneumonia/atelectasis. Recommend follow-up CT chest in 6/8 weeks to assess for resolution.  c) Mild to moderate pericardial effusion, new.  - ID consult appreciated   - Pulmonary consult appreciated     2. New mild-mod pericardial effusion Likely cause of pleuritic chest pain likely due to acute pericarditis   - ESR:elevated            - CRP:elevated            - Coxsackie :pending   - Trop negative*2   - Echocardio:pending  - Started on prednisone for pneumonia that will help in pericarditis (need slow taper 10mg every week to prevent recurrence )   - Cont colchicine 0.6mg po bid d:2   - Started on Ibuprofen prn that was stopped ([facility]he[facility] recommend against NSAID due to her PMHx)   - Cardio consult :pending      3. SLE /RA/ APLS/DVT/PE  * YOON was positive but ds DNA was negative   * last note by heme onc plan was for warfarin with target INR 3-4 and aspirin 81mg po daily that was switch to lovenox due to cerebral bleeding   - YOON:pending       - ds DNA:pending      - C3:pending        - C4:pending   - C/w lovenox home dose 110mg QD (home dose 1.5mg/kg daily)   - Rheumatology consult:pending     4. NSTEMI in 2015, subclavian stenosis s/p stent placement  - No longer on ASA 2/2 GI bleed    5. Cervical cancer sp hysterectomy,  laryngeal cancer s/p CT on remission  - Follow up as outpatient     6. HFpEF (06/21/19 EF 63%) s/p AICD+PPM   - C/w metoprolol 100mg QD    7. Chronic pain   - Cont morphine  with methylnaltroxone po      # Misc  - DVT Prophylaxis: enoxaparin  - GI Prophylaxis PPI   - Diet: Diet, DASH/TLC  - Dispo: Acute.      ILEANA MCNEIL  53y  [facility]      Patient is a 53y old  Female who presents with a chief complaint of CAP (20 May 2024 17:34)      INTERVAL HPI/OVERNIGHT EVENTS:    Patient still with chest pain  pt already has chronic pain and was not getting the right dose of her pain meds  cough also reported.no other complains   no fever overnighjt        FAMILY HISTORY:  Family history of cervical cancer    FH: thyroid cancer      T(C): 36.9 (05-21-24 @ 05:00), Max: 36.9 (05-21-24 @ 01:05)  HR: 80 (05-21-24 @ 05:00) (79 - 84)  BP: 152/71 (05-21-24 @ 05:00) (115/74 - 152/71)  RR: 18 (05-21-24 @ 05:00) (18 - 18)  SpO2: 94% (05-21-24 @ 05:00) (94% - 99%)  Wt(kg): --Vital Signs Last 24 Hrs  T(C): 36.9 (21 May 2024 05:00), Max: 36.9 (21 May 2024 01:05)  T(F): 98.5 (21 May 2024 05:00), Max: 98.5 (21 May 2024 01:05)  HR: 80 (21 May 2024 05:00) (79 - 84)  BP: 152/71 (21 May 2024 05:00) (115/74 - 152/71)  BP(mean): 88 (20 May 2024 15:42) (88 - 88)  RR: 18 (21 May 2024 05:00) (18 - 18)  SpO2: 94% (21 May 2024 05:00) (94% - 99%)    Parameters below as of 21 May 2024 01:05  Patient On (Oxygen Delivery Method): nasal cannula  O2 Flow (L/min): 4      PHYSICAL EXAM:  GENERAL: NAD, well-groomed, well-developed  NERVOUS SYSTEM:  Alert & Oriented X3,  PULM: crackles  CARDIAC: Regular rate and rhythm; No murmurs, rubs, or gallops  GI: Soft, Nontender, Nondistended; Bowel sounds present  EXTREMITIES:  2+ Peripheral Pulses,    Consultant(s) Notes Reviewed:  [x ] YES  [ ] NO  Care Discussed with Consultants/Other Providers [ x] YES  [ ] NO    LABS:                            11.7   6.96  )-----------( 216      ( 21 May 2024 06:00 )             35.8   05-20    137  |  100  |  11  ----------------------------<  100<H>  3.8   |  28  |  1.0    Ca    9.3      20 May 2024 06:31  Mg     1.7     05-20    TPro  6.3  /  Alb  3.6  /  TBili  0.2  /  DBili  x   /  AST  7   /  ALT  9   /  AlkPhos  79  05-20            Culture - Blood (collected 19 May 2024 19:59)  Source: .Blood Blood  Preliminary Report (21 May 2024 07:01):    No growth at 24 hours    Culture - Blood (collected 19 May 2024 19:59)  Source: .Blood Blood  Preliminary Report (21 May 2024 07:01):    No growth at 24 hours      acetaminophen     Tablet .. 650 milliGRAM(s) Oral every 6 hours PRN  albuterol    90 MICROgram(s) HFA Inhaler 2 Puff(s) Inhalation every 6 hours PRN  ALPRAZolam 1 milliGRAM(s) Oral four times a day PRN  aluminum hydroxide/magnesium hydroxide/simethicone Suspension 30 milliLiter(s) Oral every 4 hours PRN  ampicillin/sulbactam  IVPB 3 Gram(s) IV Intermittent every 6 hours  ampicillin/sulbactam  IVPB      budesonide  80 MICROgram(s)/formoterol 4.5 MICROgram(s) Inhaler 2 Puff(s) Inhalation two times a day  cholecalciferol 2000 Unit(s) Oral daily  colchicine 0.6 milliGRAM(s) Oral every 12 hours  diphenhydrAMINE Injectable 50 milliGRAM(s) IV Push once PRN  doxycycline IVPB      doxycycline IVPB 100 milliGRAM(s) IV Intermittent every 12 hours  enoxaparin Injectable 110 milliGRAM(s) SubCutaneous every 24 hours  folic acid 1 milliGRAM(s) Oral daily  guaiFENesin  milliGRAM(s) Oral every 12 hours  ibuprofen  Tablet. 600 milliGRAM(s) Oral every 8 hours PRN  melatonin 3 milliGRAM(s) Oral at bedtime PRN  metoprolol succinate  milliGRAM(s) Oral daily  morphine  IR 15 milliGRAM(s) Oral two times a day PRN  morphine ER Tablet 30 milliGRAM(s) Oral <User Schedule> PRN  morphine ER Tablet 60 milliGRAM(s) Oral <User Schedule>  ondansetron Injectable 4 milliGRAM(s) IV Push every 8 hours PRN  pantoprazole    Tablet 40 milliGRAM(s) Oral before breakfast  predniSONE   Tablet 40 milliGRAM(s) Oral daily  tiotropium 2.5 MICROgram(s) Inhaler 2 Puff(s) Inhalation daily      Pt is a 52 y/o F PMHx  of HTN, HLD, COPD on home O2 5L, SLE, antiphospholipid syndrome,  rheumatoid arthritis, cervical cancer sp hysterectomy,  laryngeal cancer s/p CT, RT, HFpEF (06/21/19 EF 63%) s/p AICD+PPM, DVT/PE (on lovenox QD),  NSTEMI in 2015, subclavian stenosis s/p stent placement presenting for pneumonia.    1. LL Bacterial Pneumonia with hypoxia resolved . hx of COPD   - Admit to medicine                  - Switch from zosyn to unasyn and doxycycline (allergic to vantin and moxifloxacin)   - Blood cx:NTD                - Sputum cx:pending              - MRSA:negative           - Procalcitonin:0.06  - Mucinex bid   - C/w supplemental O2, titrate PRN O2 sat goal  88 - 92%  - Cont Prednisone 40mg daily  d:2   - Cont inhalers   - CT chest:  a) Right lower lobe segmental pulmonary embolism with calcifications with similar appearance to chest CTA dated 6/24/2022 likely reflecting chronic PE.  b) New partial opacification of the left lower lobe central bronchi and lower lobe consolidation likely representing pneumonia/atelectasis. Recommend follow-up CT chest in 6/8 weeks to assess for resolution.  c) Mild to moderate pericardial effusion, new.  - ID consult appreciated   - Pulmonary consult appreciated     2. New mild-mod pericardial effusion Likely cause of pleuritic chest pain likely due to acute pericarditis   - ESR:elevated            - CRP:elevated            - Coxsackie :pending   - Trop negative*2   - Echocardio:pending  - Started on prednisone for pneumonia that will help in pericarditis (need slow taper 10mg every week to prevent recurrence )   - Cont colchicine 0.6mg po bid d:2   - Started on Ibuprofen prn that was stopped (Rheumato recommend against NSAID due to her PMHx)   - Cardio consult :pending      3. SLE /RA/ APLS/DVT/PE  * YOON was positive but ds DNA was negative   * last note by heme onc plan was for warfarin with target INR 3-4 and aspirin 81mg po daily that was switch to lovenox due to cerebral bleeding   - YOON:pending       - ds DNA:pending      - C3:pending        - C4:pending   - C/w lovenox home dose 110mg QD (home dose 1.5mg/kg daily)   - Rheumatology consult:pending     4. 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 Cervical cancer sp hysterectomy,  laryngeal cancer s/p CT on remission  - Follow up as outpatient     6. HFpEF (06/21/19 EF 63%) s/p AICD+PPM   - C/w metoprolol 100mg QD    7. Chronic pain   - Cont morphine  with methylnaltroxone po      # Misc  - DVT Prophylaxis: enoxaparin  - GI Prophylaxis PPI   - Diet: Diet, DASH/TLC  - Dispo: Acute.

## 2024-05-22 LAB
ANION GAP SERPL CALC-SCNC: 12 MMOL/L — SIGNIFICANT CHANGE UP (ref 7–14)
APTT 50/50 2HOUR INCUB: 43.2 SEC — HIGH (ref 24.5–36.6)
APTT BLD: 41.3 SEC — HIGH (ref 24.5–36.6)
APTT BLD: 53.9 SEC — HIGH (ref 24.5–35.6)
BASOPHILS # BLD AUTO: 0.02 K/UL — SIGNIFICANT CHANGE UP (ref 0–0.2)
BASOPHILS NFR BLD AUTO: 0.2 % — SIGNIFICANT CHANGE UP (ref 0–1)
BUN SERPL-MCNC: 16 MG/DL — SIGNIFICANT CHANGE UP (ref 10–20)
C3 SERPL-MCNC: 201 MG/DL — HIGH (ref 81–157)
C4 SERPL-MCNC: 39 MG/DL — SIGNIFICANT CHANGE UP (ref 13–39)
CALCIUM SERPL-MCNC: 8.8 MG/DL — SIGNIFICANT CHANGE UP (ref 8.4–10.5)
CHLORIDE SERPL-SCNC: 103 MMOL/L — SIGNIFICANT CHANGE UP (ref 98–110)
CO2 SERPL-SCNC: 25 MMOL/L — SIGNIFICANT CHANGE UP (ref 17–32)
CREAT SERPL-MCNC: 0.9 MG/DL — SIGNIFICANT CHANGE UP (ref 0.7–1.5)
DRVVT RATIO: 1.41 RATIO — HIGH (ref 0–1.21)
DRVVT SCREEN TO CONFIRM RATIO: SIGNIFICANT CHANGE UP
DSDNA AB SER-ACNC: <1 IU/ML — SIGNIFICANT CHANGE UP
EGFR: 76 ML/MIN/1.73M2 — SIGNIFICANT CHANGE UP
EOSINOPHIL # BLD AUTO: 0.08 K/UL — SIGNIFICANT CHANGE UP (ref 0–0.7)
EOSINOPHIL NFR BLD AUTO: 0.7 % — SIGNIFICANT CHANGE UP (ref 0–8)
GLUCOSE SERPL-MCNC: 124 MG/DL — HIGH (ref 70–99)
HCT VFR BLD CALC: 32.9 % — LOW (ref 37–47)
HGB BLD-MCNC: 10.5 G/DL — LOW (ref 12–16)
IMM GRANULOCYTES NFR BLD AUTO: 0.5 % — HIGH (ref 0.1–0.3)
LYMPHOCYTES # BLD AUTO: 28 % — SIGNIFICANT CHANGE UP (ref 20.5–51.1)
LYMPHOCYTES # BLD AUTO: 3 K/UL — SIGNIFICANT CHANGE UP (ref 1.2–3.4)
MAGNESIUM SERPL-MCNC: 1.6 MG/DL — LOW (ref 1.8–2.4)
MCHC RBC-ENTMCNC: 29 PG — SIGNIFICANT CHANGE UP (ref 27–31)
MCHC RBC-ENTMCNC: 31.9 G/DL — LOW (ref 32–37)
MCV RBC AUTO: 90.9 FL — SIGNIFICANT CHANGE UP (ref 81–99)
MONOCYTES # BLD AUTO: 0.94 K/UL — HIGH (ref 0.1–0.6)
MONOCYTES NFR BLD AUTO: 8.8 % — SIGNIFICANT CHANGE UP (ref 1.7–9.3)
NEUTROPHILS # BLD AUTO: 6.63 K/UL — HIGH (ref 1.4–6.5)
NEUTROPHILS NFR BLD AUTO: 61.8 % — SIGNIFICANT CHANGE UP (ref 42.2–75.2)
NORMALIZED SCT PPP-RTO: 1.57 RATIO — HIGH (ref 0–1.16)
NORMALIZED SCT PPP-RTO: ABNORMAL
NRBC # BLD: 0 /100 WBCS — SIGNIFICANT CHANGE UP (ref 0–0)
PAT CTL 2H: 44 SEC — HIGH (ref 24.5–36.6)
PLATELET # BLD AUTO: 217 K/UL — SIGNIFICANT CHANGE UP (ref 130–400)
PMV BLD: 11.3 FL — HIGH (ref 7.4–10.4)
POTASSIUM SERPL-MCNC: 3.9 MMOL/L — SIGNIFICANT CHANGE UP (ref 3.5–5)
POTASSIUM SERPL-SCNC: 3.9 MMOL/L — SIGNIFICANT CHANGE UP (ref 3.5–5)
RBC # BLD: 3.62 M/UL — LOW (ref 4.2–5.4)
RBC # FLD: 13.6 % — SIGNIFICANT CHANGE UP (ref 11.5–14.5)
SODIUM SERPL-SCNC: 140 MMOL/L — SIGNIFICANT CHANGE UP (ref 135–146)
WBC # BLD: 10.72 K/UL — SIGNIFICANT CHANGE UP (ref 4.8–10.8)
WBC # FLD AUTO: 10.72 K/UL — SIGNIFICANT CHANGE UP (ref 4.8–10.8)

## 2024-05-22 PROCEDURE — 99232 SBSQ HOSP IP/OBS MODERATE 35: CPT

## 2024-05-22 PROCEDURE — 99222 1ST HOSP IP/OBS MODERATE 55: CPT

## 2024-05-22 PROCEDURE — 99233 SBSQ HOSP IP/OBS HIGH 50: CPT

## 2024-05-22 RX ADMIN — Medication 600 MILLIGRAM(S): at 05:13

## 2024-05-22 RX ADMIN — Medication 100 MILLIGRAM(S): at 05:13

## 2024-05-22 RX ADMIN — MORPHINE SULFATE 60 MILLIGRAM(S): 50 CAPSULE, EXTENDED RELEASE ORAL at 12:45

## 2024-05-22 RX ADMIN — Medication 100 MILLIGRAM(S): at 05:12

## 2024-05-22 RX ADMIN — PANTOPRAZOLE SODIUM 40 MILLIGRAM(S): 20 TABLET, DELAYED RELEASE ORAL at 05:13

## 2024-05-22 RX ADMIN — Medication 1 MILLIGRAM(S): at 12:15

## 2024-05-22 RX ADMIN — AMPICILLIN SODIUM AND SULBACTAM SODIUM 200 GRAM(S): 250; 125 INJECTION, POWDER, FOR SUSPENSION INTRAMUSCULAR; INTRAVENOUS at 12:14

## 2024-05-22 RX ADMIN — MORPHINE SULFATE 60 MILLIGRAM(S): 50 CAPSULE, EXTENDED RELEASE ORAL at 00:03

## 2024-05-22 RX ADMIN — MORPHINE SULFATE 60 MILLIGRAM(S): 50 CAPSULE, EXTENDED RELEASE ORAL at 17:24

## 2024-05-22 RX ADMIN — AMPICILLIN SODIUM AND SULBACTAM SODIUM 200 GRAM(S): 250; 125 INJECTION, POWDER, FOR SUSPENSION INTRAMUSCULAR; INTRAVENOUS at 00:03

## 2024-05-22 RX ADMIN — MORPHINE SULFATE 15 MILLIGRAM(S): 50 CAPSULE, EXTENDED RELEASE ORAL at 23:07

## 2024-05-22 RX ADMIN — BUDESONIDE AND FORMOTEROL FUMARATE DIHYDRATE 2 PUFF(S): 160; 4.5 AEROSOL RESPIRATORY (INHALATION) at 08:22

## 2024-05-22 RX ADMIN — NALOXEGOL OXALATE 25 MILLIGRAM(S): 12.5 TABLET, FILM COATED ORAL at 12:15

## 2024-05-22 RX ADMIN — Medication 0.6 MILLIGRAM(S): at 05:13

## 2024-05-22 RX ADMIN — MORPHINE SULFATE 15 MILLIGRAM(S): 50 CAPSULE, EXTENDED RELEASE ORAL at 14:45

## 2024-05-22 RX ADMIN — MORPHINE SULFATE 60 MILLIGRAM(S): 50 CAPSULE, EXTENDED RELEASE ORAL at 05:55

## 2024-05-22 RX ADMIN — BUDESONIDE AND FORMOTEROL FUMARATE DIHYDRATE 2 PUFF(S): 160; 4.5 AEROSOL RESPIRATORY (INHALATION) at 23:36

## 2024-05-22 RX ADMIN — MORPHINE SULFATE 15 MILLIGRAM(S): 50 CAPSULE, EXTENDED RELEASE ORAL at 03:56

## 2024-05-22 RX ADMIN — Medication 1 MILLIGRAM(S): at 14:18

## 2024-05-22 RX ADMIN — TIOTROPIUM BROMIDE 2 PUFF(S): 18 CAPSULE ORAL; RESPIRATORY (INHALATION) at 08:21

## 2024-05-22 RX ADMIN — Medication 100 MILLIGRAM(S): at 17:23

## 2024-05-22 RX ADMIN — Medication 2000 UNIT(S): at 12:16

## 2024-05-22 RX ADMIN — MORPHINE SULFATE 60 MILLIGRAM(S): 50 CAPSULE, EXTENDED RELEASE ORAL at 17:54

## 2024-05-22 RX ADMIN — MORPHINE SULFATE 15 MILLIGRAM(S): 50 CAPSULE, EXTENDED RELEASE ORAL at 23:37

## 2024-05-22 RX ADMIN — AMPICILLIN SODIUM AND SULBACTAM SODIUM 200 GRAM(S): 250; 125 INJECTION, POWDER, FOR SUSPENSION INTRAMUSCULAR; INTRAVENOUS at 17:23

## 2024-05-22 RX ADMIN — Medication 40 MILLIGRAM(S): at 05:13

## 2024-05-22 RX ADMIN — MORPHINE SULFATE 15 MILLIGRAM(S): 50 CAPSULE, EXTENDED RELEASE ORAL at 14:15

## 2024-05-22 RX ADMIN — MORPHINE SULFATE 60 MILLIGRAM(S): 50 CAPSULE, EXTENDED RELEASE ORAL at 12:15

## 2024-05-22 RX ADMIN — ENOXAPARIN SODIUM 110 MILLIGRAM(S): 100 INJECTION SUBCUTANEOUS at 04:01

## 2024-05-22 RX ADMIN — AMPICILLIN SODIUM AND SULBACTAM SODIUM 200 GRAM(S): 250; 125 INJECTION, POWDER, FOR SUSPENSION INTRAMUSCULAR; INTRAVENOUS at 05:12

## 2024-05-22 RX ADMIN — Medication 0.6 MILLIGRAM(S): at 17:24

## 2024-05-22 NOTE — PROGRESS NOTE ADULT - SUBJECTIVE AND OBJECTIVE BOX
Over Night Events: events noted, still co SOB, pleuritic CP, Afebrile    PHYSICAL EXAM    ICU Vital Signs Last 24 Hrs  T(C): 36.6 (22 May 2024 04:57), Max: 36.6 (21 May 2024 13:50)  T(F): 97.9 (22 May 2024 04:57), Max: 97.9 (22 May 2024 04:57)  HR: 77 (22 May 2024 04:57) (77 - 86)  BP: 119/70 (22 May 2024 04:57) (119/70 - 143/89)  RR: 18 (22 May 2024 04:57) (18 - 18)  SpO2: 98% (22 May 2024 04:57) (94% - 98%)    O2 Parameters below as of 21 May 2024 21:00  Patient On (Oxygen Delivery Method): room air            General: ill looking  Lungs: mild wheezing  Cardiovascular: Regular   Abdomen: Soft, Positive BS  Extremities: No clubbing   Neurological: Non focal         LABS:                          10.5   10.72 )-----------( 217      ( 22 May 2024 05:45 )             32.9                                               05-22    140  |  103  |  16  ----------------------------<  124<H>  3.9   |  25  |  0.9    Ca    8.8      22 May 2024 05:45  Mg     1.6     05-22    TPro  6.1  /  Alb  3.6  /  TBili  0.3  /  DBili  x   /  AST  37  /  ALT  23  /  AlkPhos  96  05-21      PTT - ( 21 May 2024 11:50 )  PTT:60.4 sec                                       Urinalysis Basic - ( 22 May 2024 05:45 )    Color: x / Appearance: x / SG: x / pH: x  Gluc: 124 mg/dL / Ketone: x  / Bili: x / Urobili: x   Blood: x / Protein: x / Nitrite: x   Leuk Esterase: x / RBC: x / WBC x   Sq Epi: x / Non Sq Epi: x / Bacteria: x                                                  LIVER FUNCTIONS - ( 21 May 2024 06:00 )  Alb: 3.6 g/dL / Pro: 6.1 g/dL / ALK PHOS: 96 U/L / ALT: 23 U/L / AST: 37 U/L / GGT: x                                                  Culture - Blood (collected 19 May 2024 19:59)  Source: .Blood Blood  Preliminary Report (22 May 2024 07:02):    No growth at 48 Hours    Culture - Blood (collected 19 May 2024 19:59)  Source: .Blood Blood  Preliminary Report (22 May 2024 07:02):    No growth at 48 Hours                                                                                           MEDICATIONS  (STANDING):  ampicillin/sulbactam  IVPB      ampicillin/sulbactam  IVPB 3 Gram(s) IV Intermittent every 6 hours  budesonide  80 MICROgram(s)/formoterol 4.5 MICROgram(s) Inhaler 2 Puff(s) Inhalation two times a day  cholecalciferol 2000 Unit(s) Oral daily  colchicine 0.6 milliGRAM(s) Oral every 12 hours  doxycycline IVPB      doxycycline IVPB 100 milliGRAM(s) IV Intermittent every 12 hours  enoxaparin Injectable 110 milliGRAM(s) SubCutaneous every 24 hours  folic acid 1 milliGRAM(s) Oral daily  guaiFENesin  milliGRAM(s) Oral every 12 hours  metoprolol succinate  milliGRAM(s) Oral daily  morphine ER Tablet 60 milliGRAM(s) Oral four times a day  naloxegol 25 milliGRAM(s) Oral daily  pantoprazole    Tablet 40 milliGRAM(s) Oral before breakfast  polyethylene glycol 3350 17 Gram(s) Oral daily  predniSONE   Tablet 40 milliGRAM(s) Oral daily  senna 2 Tablet(s) Oral at bedtime  tiotropium 2.5 MICROgram(s) Inhaler 2 Puff(s) Inhalation daily    MEDICATIONS  (PRN):  acetaminophen     Tablet .. 650 milliGRAM(s) Oral every 6 hours PRN Temp greater or equal to 38C (100.4F), Mild Pain (1 - 3)  albuterol    90 MICROgram(s) HFA Inhaler 2 Puff(s) Inhalation every 6 hours PRN Shortness of Breath and/or Wheezing  ALPRAZolam 1 milliGRAM(s) Oral every 6 hours PRN Anxiety  aluminum hydroxide/magnesium hydroxide/simethicone Suspension 30 milliLiter(s) Oral every 4 hours PRN Dyspepsia  diphenhydrAMINE Injectable 50 milliGRAM(s) IV Push once PRN Allergy symptoms  melatonin 3 milliGRAM(s) Oral at bedtime PRN Insomnia  morphine  IR 15 milliGRAM(s) Oral every 4 hours PRN Severe Pain (7 - 10)  ondansetron Injectable 4 milliGRAM(s) IV Push every 8 hours PRN Nausea and/or Vomiting

## 2024-05-22 NOTE — CONSULT NOTE ADULT - ATTENDING COMMENTS
54 y/o woman with h/o SLE, rheumatoid arthritis and antiphospholipid syndrome s/p DVT x 3, PE, and MI admitted with pneumonia and small-moderate pericardial effusion, rheumatology consulted for possible underlying rheumatologic etiology. Pt says she was diagnosed with SLE and RA by an infectious disease doctor at age 14 years, when she developed rashes, fatigue and joint pains. She had previously been on multiple medications for including hydroxychloroquine, Benlysta, leflunomide and mycophenolate mofetil, but has not been on any treatment for RA or SLE in 4 years because she has not had any improvement in her symptoms on these medications, and was also told that her SLE and RA are in remission. Right now she is on Lovenox, was previously on Coumadin but last year she developed an intracranial hemorrhage as well as an intra-articular hemorrhage into her ankle, so she was switched away from Coumadin. She has seen multiple rheumatologists in the past, most recently Dr. Anthony in March 2024, when she had labs done which demonstrated +YOON but otherwise negative labs for SLE, and +LAC and beta 2 GP1 IgM. 5 days prior to her current admission, pt woke up with chest pain and SOB, with fever 104F, with no improvement on Augmentin as an outpatient, so she presented to [facility], where she had CTA chest which demonstrated a new mild-moderate pericardial effusion, and a TTE which demonstrated a small pericardial effusion. Pt reports stable chronic pain in her hands and edema in her LEs, denies rashes, oral ulcers. + Episodes of fingers turning blue x years. Her exam today demonstrates wheezing throughout the lung fields, otherwise no other abnormal findings. Overall, low suspicion that pt's pericardial effusion is related to SLE as pt has had recent labs for SLE with no e/o disease, and no exam findings suggestive of active SLE. It is possible that her pericardial effusion could be related to her antiphospholipid syndrome, but I would recommend ruling out other causes of pericardial effusion as well.   - Please send urinalysis, cyclic citrullinated peptide, rheumatoid factor, GALLITO antibody, Sjogren's antibody, creatine kinase, scleroderma antibody, centromere antibody, RNA polymerase III antibody, Coxsackie virus titers, TSH, EBV, CMV, HIV, parvovirus, hepatitis B titers, RPR, quantiferon, IgG subclasses, ANCA  - Continue prednisone 40 mg q day  - Continue colchicine 0.6 mg BID  - Continue Lovenox for pt's antiphospholipid syndrome

## 2024-05-22 NOTE — PROGRESS NOTE ADULT - SUBJECTIVE AND OBJECTIVE BOX
24H events:    Patient is a 53y old Female who presents with a chief complaint of CAP (22 May 2024 10:41)    Primary diagnosis of Pneumonia      Day 1:  Day 2:  Day 3:     Today is hospital day 2d. This morning patient was seen and examined at bedside, resting comfortably in bed.    No acute or major events overnight.    Code Status:    Family communication:  Contact date:  Name of person contacted:  Relationship to patient:  Communication details:  What matters most:    PAST MEDICAL & SURGICAL HISTORY  Lupus    RA (rheumatoid arthritis)    HTN (hypertension)    CHF (congestive heart failure)    COPD (chronic obstructive pulmonary disease)    DVT (deep venous thrombosis)    Pulmonary embolism    History of laryngeal cancer    GERD (gastroesophageal reflux disease)    Cervical cancer    APS (antiphospholipid syndrome)    S/P appendectomy    S/P cholecystectomy    AICD (automatic cardioverter/defibrillator) present  [employer]    S/P hysterectomy    History of back surgery    H/O foot surgery    S/P eye surgery    Subclavian arterial stenosis      SOCIAL HISTORY:  Social History:      ALLERGIES:  Plaquenil (Other)  Avelox (Other)  Arava (Anaphylaxis; Angioedema)  adhesives (Rash; Urticaria)  Vantin (Other (Mild))    MEDICATIONS:  STANDING MEDICATIONS  ampicillin/sulbactam  IVPB      ampicillin/sulbactam  IVPB 3 Gram(s) IV Intermittent every 6 hours  budesonide  80 MICROgram(s)/formoterol 4.5 MICROgram(s) Inhaler 2 Puff(s) Inhalation two times a day  cholecalciferol 2000 Unit(s) Oral daily  colchicine 0.6 milliGRAM(s) Oral every 12 hours  doxycycline IVPB      doxycycline IVPB 100 milliGRAM(s) IV Intermittent every 12 hours  enoxaparin Injectable 110 milliGRAM(s) SubCutaneous every 24 hours  folic acid 1 milliGRAM(s) Oral daily  guaiFENesin  milliGRAM(s) Oral every 12 hours  metoprolol succinate  milliGRAM(s) Oral daily  morphine ER Tablet 60 milliGRAM(s) Oral four times a day  naloxegol 25 milliGRAM(s) Oral daily  pantoprazole    Tablet 40 milliGRAM(s) Oral before breakfast  predniSONE   Tablet 40 milliGRAM(s) Oral daily  senna 2 Tablet(s) Oral at bedtime  tiotropium 2.5 MICROgram(s) Inhaler 2 Puff(s) Inhalation daily    PRN MEDICATIONS  acetaminophen     Tablet .. 650 milliGRAM(s) Oral every 6 hours PRN  albuterol    90 MICROgram(s) HFA Inhaler 2 Puff(s) Inhalation every 6 hours PRN  ALPRAZolam 1 milliGRAM(s) Oral every 6 hours PRN  aluminum hydroxide/magnesium hydroxide/simethicone Suspension 30 milliLiter(s) Oral every 4 hours PRN  diphenhydrAMINE Injectable 50 milliGRAM(s) IV Push once PRN  melatonin 3 milliGRAM(s) Oral at bedtime PRN  morphine  IR 15 milliGRAM(s) Oral every 4 hours PRN  ondansetron Injectable 4 milliGRAM(s) IV Push every 8 hours PRN    VITALS:   T(F): 97.9  HR: 77  BP: 119/70  RR: 18  SpO2: 98%    PHYSICAL EXAM:  GENERAL:   ( x ) NAD, lying in bed comfortably     (  ) obtunded     (  ) lethargic     (  ) somnolent    HEAD:   ( x ) Atraumatic     (  ) hematoma     (  ) laceration (specify location:       )     NECK:  ( x ) Supple     (  ) neck stiffness     (  ) nuchal rigidity     (  )  no JVD     (  ) JVD present ( -- cm)    HEART:  Rate -->     (x  ) normal rate     (  ) bradycardic     (  ) tachycardic  Rhythm -->     (x  ) regular     (  ) regularly irregular     (  ) irregularly irregular  Murmurs -->     (  ) normal s1s2     (  ) systolic murmur     (  ) diastolic murmur     (  ) continuous murmur      (  ) S3 present     (  ) S4 present    LUNGS:   (x  )Unlabored respirations     (  ) tachypnea  ( x ) B/L air entry     (  ) decreased breath sounds in:  (location     )    (  ) no adventitious sound     (  ) crackles     (  ) wheezing      (  ) rhonchi      (specify location:       )  (  ) chest wall tenderness (specify location:       )    ABDOMEN:   (x  ) Soft     (  ) tense   |   ( x ) nondistended     (  ) distended   |   (  ) +BS     (  ) hypoactive bowel sounds     (  ) hyperactive bowel sounds  ( x ) nontender     (  ) RUQ tenderness     (  ) RLQ tenderness     (  ) LLQ tenderness     (  ) epigastric tenderness     (  ) diffuse tenderness  (  ) Splenomegaly      (  ) Hepatomegaly      (  ) Jaundice     (  ) ecchymosis     EXTREMITIES:  ( x ) Normal     (  ) Rash     (  ) ecchymosis     (  ) varicose veins      (  ) pitting edema     (  ) non-pitting edema   (  ) ulceration     (  ) gangrene:     (location:     )    NERVOUS SYSTEM:    ( x ) A&Ox3     (  ) confused     (  ) lethargic  CN II-XII:     (  ) Intact     (  ) deficits found     (Specify:     )   Upper extremities:     (  ) no sensorimotor deficits     (  ) weakness     (  ) loss of proprioception/vibration     (  ) loss of touch/temperature (specify:    )  Lower extremities:     (  ) no sensorimotor deficits     (  ) weakness     (  ) loss of proprioception/vibration     (  ) loss of touch/temperature (specify:    )    SKIN:   (x  ) No rashes or lesions     (  ) maculopapular rash     (  ) pustules     (  ) vesicles     (  ) ulcer     (  ) ecchymosis     (specify location:     )    AMPAC score:    (  ) Indwelling Rodarte Catheter:   Date insterted:    Reason (  ) Critical illness     (  ) urinary retention    (  ) Accurate Ins/Outs Monitoring     (  ) CMO patient    (  ) Central Line:   Date inserted:  Location: (  ) Right IJ     (  ) Left IJ     (  ) Right Fem     (  ) Left Fem    (  ) SPC        (  ) pigtail       (  ) PEG tube       (  ) colostomy       (  ) jejunostomy  (  ) U-Dall    LABS:                        10.5   10.72 )-----------( 217      ( 22 May 2024 05:45 )             32.9     05-22    140  |  103  |  16  ----------------------------<  124<H>  3.9   |  25  |  0.9    Ca    8.8      22 May 2024 05:45  Mg     1.6     05-22    TPro  6.1  /  Alb  3.6  /  TBili  0.3  /  DBili  x   /  AST  37  /  ALT  23  /  AlkPhos  96  05-21    PTT - ( 21 May 2024 11:50 )  PTT:60.4 sec  Urinalysis Basic - ( 22 May 2024 05:45 )    Color: x / Appearance: x / SG: x / pH: x  Gluc: 124 mg/dL / Ketone: x  / Bili: x / Urobili: x   Blood: x / Protein: x / Nitrite: x   Leuk Esterase: x / RBC: x / WBC x   Sq Epi: x / Non Sq Epi: x / Bacteria: x            Culture - Blood (collected 19 May 2024 19:59)  Source: .Blood Blood  Preliminary Report (22 May 2024 07:02):    No growth at 48 Hours    Culture - Blood (collected 19 May 2024 19:59)  Source: .Blood Blood  Preliminary Report (22 May 2024 07:02):    No growth at 48 Hours          RADIOLOGY:

## 2024-05-22 NOTE — CONSULT NOTE ADULT - SUBJECTIVE AND OBJECTIVE BOX
54 y/o F   PMHx  of HTN, HLD, COPD on home O2,  cervical cancer sp hysterectomy,  laryngeal cancer s/p CT, RT, HFpEF (06/21/19 EF 63%) s/p AICD+PPM, DVT/PE (on lovenox QD),  NSTEMI in 2015, subclavian stenosis s/p stent placement  Rheum Hx : SLE, lupus anticoagulant, antiphospholipid syndrome,  rheumatoid arthritis,     Presenting for fever and pleuritic chest pain.     CT CHEST PE New partial opacification of the left lower lobe central bronchi and lower lobe consolidation likely representing pneumonia/atelectasis.Mild to moderate pericardial effusion, new.    # LL Bacterial Pneumonia with hypoxia resolved . hx of COPD       # chronic PE   / HX DVT           - On unasyn and doxycycline (allergic to vantin and moxifloxacin)   - Blood cx:NTD                - Sputum cx:pending              - MRSA:negative           - Procalcitonin:0.06  - Cont Prednisone 40mg daily  d:2    # New mild-mod pericardial effusion Likely cause of pleuritic chest pain likely due to acute pericarditis   - ESR: 71           - CRP: 87            - Coxsackie :pending   - per cardio pericarditis is debatable, but will do a period of therapy with Colchicine (chest pain is pleuritic, ECG is normal, symptoms responded to antibiotic and steroid) - c/w Colchicine 0.6 mg bid for 1 month and steroids for pneumonia   - Trop negative*2   - Echocardio:pending  - Started on prednisone for pneumonia that will help in pericarditis (need slow taper 10mg every week to prevent recurrence )   - Cont colchicine 0.6mg po bid d:2   - Started on Ibuprofen prn that was stopped (recommend against NSAID due to her PMHx)     3. SLE /RA/ APLS/DVT/PE  - YOON 5/2022 :  1:320  - ds DNA 5/21 this admission: negative   - 5/2022 - SSA,SSB, RNP, C/P ANCA negative   * heme onc plan was for warfarin with target INR 3-4 and aspirin 81mg po daily that was switch to lovenox due to cerebral bleeding   - YOON:pending      - C3:pending        - C4:pending   - C/w lovenox home dose 110mg QD (home dose 1.5mg/kg daily)     4. 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 Cervical cancer sp hysterectomy,  laryngeal cancer s/p CT on remission  - Follow up as outpatient     6. HFpEF (06/21/19 EF 63%) s/p AICD+PPM   - C/w metoprolol 100mg QD    7. Chronic pain   - Cont morphine  with methylnaltroxone po       52 y/o F   PMHx  of HTN, HLD, COPD on home O2,  cervical cancer sp hysterectomy,  laryngeal cancer s/p CT, RT, HFpEF (06/21/19 EF 63%) s/p AICD+PPM, DVT/PE (on lovenox QD),  NSTEMI in 2015, subclavian stenosis s/p stent placement  Rheum Hx : SLE, lupus anticoagulant, antiphospholipid syndrome,  rheumatoid arthritis,     Presenting for fever and pleuritic chest pain.     CT CHEST PE New partial opacification of the left lower lobe central bronchi and lower lobe consolidation likely representing pneumonia/atelectasis.Mild to moderate pericardial effusion, new.    # LL Bacterial Pneumonia with hypoxia resolved . hx of COPD       # chronic PE   / HX DVT           - On unasyn and doxycycline (allergic to vantin and moxifloxacin)   - Blood cx:NTD                - Sputum cx:pending              - MRSA:negative           - Procalcitonin:0.06  - Cont Prednisone 40mg daily  d:2    # New mild-mod pericardial effusion Likely cause of pleuritic chest pain likely due to acute pericarditis   - ESR: 71           - CRP: 87            - Coxsackie :pending   - per cardio pericarditis is debatable, but will do a period of therapy with Colchicine (chest pain is pleuritic, ECG is normal, symptoms responded to antibiotic and steroid) - c/w Colchicine 0.6 mg bid for 1 month and steroids for pneumonia   - Trop negative*2   - Echocardio: 5/21 : EF 64%, Small pericardial effusion  - Started on prednisone for pneumonia that will help in pericarditis (need slow taper 10mg every week to prevent recurrence )   - Cont colchicine 0.6mg po bid d:2   - Started on Ibuprofen prn that was stopped (recommend against NSAID due to her PMHx)     3. SLE /RA/ APLS/DVT/PE  - YOON 5/2022 :  1:320  - ds DNA 5/21 this admission: negative   - 5/2022 - SSA,SSB, RNP, C/P ANCA negative   * heme onc plan was for warfarin with target INR 3-4 and aspirin 81mg po daily that was switch to lovenox due to cerebral bleeding   - YOON:pending      - C3:pending        - C4:pending   - C/w lovenox home dose 110mg QD (home dose 1.5mg/kg daily)     4. 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 Cervical cancer sp hysterectomy,  laryngeal cancer s/p CT on remission  - Follow up as outpatient     6. HFpEF (06/21/19 EF 63%) s/p AICD+PPM   - C/w metoprolol 100mg QD    7. Chronic pain   - Cont morphine  with methylnaltroxone po

## 2024-05-22 NOTE — PROGRESS NOTE ADULT - ASSESSMENT
54yo female with pmh  of HTN, HLD, COPD on home O2 5L, SLE, antiphospholipid syndrome,  rheumatoid arthritis, cervical cancer sp hysterectomy,  laryngeal cancer s/p CT, RT, HFpEF (06/21/19 EF 63%) s/p AICD+PPM, DVT/PE (on lovenox QD),  NSTEMI in 2015, subclavian stenosis s/p stent placement presenting for pneumonia.    #LLL Bacterial Pneumonia with hypoxia, resolved  #H/o COPD   -CT chest - New partial opacification of the left lower lobe central bronchi and lower lobe consolidation likely representing pneumonia/atelectasis. Recommend follow-up CT chest in 6/8 weeks to assess for resolution.  -c/w Unasyn and doxycycline per ID  (allergic to vantin and moxifloxacin)   -blood cultures NTD  -sputum culture pending  -MRSA nares negative  -procal 0.06  -pulm consulted appreciated  -c/w mucinex bid  -c/w prednisone 40mg qd x 5 days, today is day 3  -c/w albuterol, symbicort inhalers     #Mild-to-moderate pericardial effusion  #r/o pericarditis   -CT with new mild-to-moderate pericardial effusion  -TTE:  1. Normal global left ventricular systolic function.   2. LV Ejection Fraction by Hendricks's Method with a biplane EF of 64 %.   3. Normal left ventricular internal cavity size.   4. Normal right ventricular size and function.   5. No significant valve disease.   6. Small pericardial effusion. No echocardiographic evidence of   tamponade.   7. Compared to study dated 6/15/23, there is a pericardial effusion on current study.  -trop negative  -ESR 71, CRP 87.5  -coxsackie pending  -Cardio consult Dr. Magana - unlikely pericarditis (chest pain is pleuritic, ECG normal, symptoms responded to abx and steroids) - trial colchicine 0.6mg bid x 1 month. If there is real pericarditis, will need for 3 months.  -c/w prednisone 40mg x 5 days (today is day 3)   -c/w colchicine 0.6mg q12h for 1 months    #SLE  #rheumatoid arthritis   #antiphospholipid syndrom  #DVT/PE  -YOON was positive but ds DNA was negative   -last note by heme onc plan was for warfarin with target INR 3-4 and aspirin 81mg po daily that was switch to lovenox due to cerebral bleeding   -f/u labs: YOON, dsDNA, C3, C4, lupus profile   -CT chest with Right lower lobe segmental pulmonary embolism with calcifications with similar appearance to chest CTA dated 6/24/2022 likely reflecting chronic PE.  -C/w lovenox home dose 110mg QD (home dose 1.5mg/kg daily)   -Rheumatology consult: pending     #HFpEF s/p AICD+PPM   -TTE 06/21/19 EF 63%  -f/u TTE this admission   - C/w metoprolol 100mg QD    #NSTEMI in 2015, subclavian stenosis s/p stent placement   -No longer on ASA 2/2 GI bleed    #Cervical cancer /sp hysterectomy  #Laryngeal cancer s/p chemo, in remission  -outpatient followup     #Chronic pain   -c/w morphine:  morphine ER 60mg 4 times a day standing  morphine IR 15mg q4h PRN     Misc:  - DVT Prophylaxis: Lovenox   - GI Prophylaxis: PPI   - Diet: Diet, DASH/TLC

## 2024-05-22 NOTE — PROGRESS NOTE ADULT - ASSESSMENT
ASSESSMENT  52 y/o F PMHx  of HTN, HLD, COPD on home O2, SLE, lupus anticoagulant, antiphospholipid syndrome,  rheumatoid arthritis, cervical cancer sp hysterectomy,  laryngeal cancer s/p CT, RT, HFpEF (06/21/19 EF 63%) s/p AICD+PPM, DVT/PE (on lovenox QD),  NSTEMI in 2015, subclavian stenosis s/p stent placement presenting for fever and pleuritic chest pain.     IMPRESSION  #Possible CAP vs viral , chronic PE , Possible pericarditis     Was on PO augmentin as outpatient   MRSA PCR Result.: Negative (05-20-24 @ 23:15)  Legionella Antigen, Urine: Negative (05-20-24 @ 16:53)  Streptococcus pneumoniae Ag, Ur Result: Negative (05-20-24 @ 16:53)  Procalcitonin: 0.06 (05-20-24 @ 06:31)    CTA Right lower lobe segmental pulmonary embolism with calcifications with   similar appearance to chest CTA dated 6/24/2022 likely reflecting chronic   PE.  New partial opacification of the left lower lobe central bronchi and lower lobe consolidation likely representing pneumonia/atelectasis.   Recommend follow-up CT chest in 6/8 weeks to assess for resolution.  Mild to moderate pericardial effusion, new.  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    Creatinine: 1.0 (05-20-24 @ 06:31)  QTC Calculation(Bazett) 422 ms  Height (cm): 160 (01-10-24 @ 07:27)  Weight (kg): 76 (05-20-24 @ 05:16)    RECOMMENDATIONS  - Send Adenovirus PCR & Coxsackie Ab serum studies   - Continue unasyn 3g q6h IV  - Continue doxycycline IVPB 100 milliGRAM(s) IV Intermittent every 12 hours  - x 7 days if D/C prior- PO levaquin 750mg daily   - Appreciate Cards consult- Possibility of pericarditis is debatable, but will do a period of therapy with Colchicine (chest pain is pleuritic, ECG is normal, symptoms responded to antibiotic and steroid)  - f/u Rheum consult     If any questions, please send a message or call on [employer]  Please continue to update ID with any pertinent new laboratory, radiographic findings, or change in clinical status

## 2024-05-22 NOTE — PROGRESS NOTE ADULT - SUBJECTIVE AND OBJECTIVE BOX
ILEANA MCNEIL  53y  Female      Patient is a 53y old  Female who presents with a chief complaint of CAP.       INTERVAL HPI/OVERNIGHT EVENTS:      ******************************* REVIEW OF SYSTEMS:**********************************************    All other review of systems negative    *********************** VITALS ******************************************    T(F): 97.6 (05-22-24 @ 14:07)  HR: 103 (05-22-24 @ 14:07) (77 - 103)  BP: 158/84 (05-22-24 @ 14:07) (119/70 - 158/84)  RR: 20 (05-22-24 @ 14:07) (18 - 20)  SpO2: 98% (05-22-24 @ 04:57) (94% - 98%)            ******************************** PHYSICAL EXAM:**************************************************  GENERAL: NAD    PSYCH: no agitation, baseline mentation  HEENT:     NERVOUS SYSTEM:  Alert & Oriented X3,   PULMONARY: CHRISTOPHER, CTA    CARDIOVASCULAR: S1S2 RRR    GI: Soft, NT, ND; BS present.    EXTREMITIES:  trace LE  edema    LYMPH: No lymphadenopathy noted    SKIN: No rashes or lesions      **************************** LABS *******************************************************                          10.5   10.72 )-----------( 217      ( 22 May 2024 05:45 )             32.9     05-22    140  |  103  |  16  ----------------------------<  124<H>  3.9   |  25  |  0.9    Ca    8.8      22 May 2024 05:45  Mg     1.6     05-22    TPro  6.1  /  Alb  3.6  /  TBili  0.3  /  DBili  x   /  AST  37  /  ALT  23  /  AlkPhos  96  05-21      Urinalysis Basic - ( 22 May 2024 05:45 )    Color: x / Appearance: x / SG: x / pH: x  Gluc: 124 mg/dL / Ketone: x  / Bili: x / Urobili: x   Blood: x / Protein: x / Nitrite: x   Leuk Esterase: x / RBC: x / WBC x   Sq Epi: x / Non Sq Epi: x / Bacteria: x      PTT - ( 21 May 2024 11:50 )  PTT:60.4 sec  Lactate Trend        CAPILLARY BLOOD GLUCOSE              **************************Active Medications *******************************************  Plaquenil (Other)  Avelox (Other)  Arava (Anaphylaxis; Angioedema)  adhesives (Rash; Urticaria)  Vantin (Other (Mild))      acetaminophen     Tablet .. 650 milliGRAM(s) Oral every 6 hours PRN  albuterol    90 MICROgram(s) HFA Inhaler 2 Puff(s) Inhalation every 6 hours PRN  ALPRAZolam 1 milliGRAM(s) Oral every 6 hours PRN  aluminum hydroxide/magnesium hydroxide/simethicone Suspension 30 milliLiter(s) Oral every 4 hours PRN  ampicillin/sulbactam  IVPB      ampicillin/sulbactam  IVPB 3 Gram(s) IV Intermittent every 6 hours  budesonide  80 MICROgram(s)/formoterol 4.5 MICROgram(s) Inhaler 2 Puff(s) Inhalation two times a day  cholecalciferol 2000 Unit(s) Oral daily  colchicine 0.6 milliGRAM(s) Oral every 12 hours  diphenhydrAMINE Injectable 50 milliGRAM(s) IV Push once PRN  doxycycline IVPB 100 milliGRAM(s) IV Intermittent every 12 hours  doxycycline IVPB      enoxaparin Injectable 110 milliGRAM(s) SubCutaneous every 24 hours  folic acid 1 milliGRAM(s) Oral daily  guaiFENesin  milliGRAM(s) Oral every 12 hours  melatonin 3 milliGRAM(s) Oral at bedtime PRN  metoprolol succinate  milliGRAM(s) Oral daily  morphine  IR 15 milliGRAM(s) Oral every 4 hours PRN  morphine ER Tablet 60 milliGRAM(s) Oral four times a day  naloxegol 25 milliGRAM(s) Oral daily  ondansetron Injectable 4 milliGRAM(s) IV Push every 8 hours PRN  pantoprazole    Tablet 40 milliGRAM(s) Oral before breakfast  predniSONE   Tablet 40 milliGRAM(s) Oral daily  senna 2 Tablet(s) Oral at bedtime  tiotropium 2.5 MICROgram(s) Inhaler 2 Puff(s) Inhalation daily      ***************************************************  RADIOLOGY & ADDITIONAL TESTS:    Imaging Personally Reviewed:  [ ] YES  [ ] NO    HEALTH ISSUES - PROBLEM Dx:

## 2024-05-22 NOTE — PROGRESS NOTE ADULT - ASSESSMENT
54yo female with pmh  of HTN, HLD, COPD on home O2 5L, SLE, antiphospholipid syndrome,  rheumatoid arthritis, cervical cancer sp hysterectomy,  laryngeal cancer s/p CT, RT, HFpEF (06/21/19 EF 63%) s/p AICD+PPM, DVT/PE (on lovenox QD),  NSTEMI in 2015, subclavian stenosis s/p stent placement presenting for pneumonia.    #LLL Bacterial Pneumonia with Chronic hypoxia  #H/o COPD   -CT chest - New partial opacification of the left lower lobe central bronchi and lower lobe consolidation likely representing pneumonia/atelectasis. Recommend follow-up CT chest in 6/8 weeks to assess for resolution.  -c/w Unasyn and doxycycline per ID  (allergic to vantin and moxifloxacin)   -blood cultures NTD  -sputum culture pending  -MRSA nares negative  -procal 0.06  -pulm consulted appreciated  -c/w mucinex bid  -c/w prednisone 40mg qd x 5 days, today is day 3  -c/w albuterol, symbicort inhalers     #Mild-to-moderate pericardial effusion  #r/o pericarditis   -CT with new mild-to-moderate pericardial effusion  -TTE:   LV Ejection Fraction by Hendricks's Method with a biplane EF of 64 %.   Small pericardial effusion. No echocardiographic evidence of   tamponade.  Compared to study dated 6/15/23, there is a pericardial effusion on current study.  -trop negative  -ESR 71, CRP 87.5  -coxsackie pending, will send Adeno PCR   -Cardio consult Dr. Magana - unlikely pericarditis (chest pain is pleuritic, ECG normal, symptoms responded to abx and steroids) - trial colchicine 0.6mg bid x 1 month. If there is real pericarditis, will need for 3 months.  -c/w prednisone 40mg x 5 days (today is day 3)   -c/w colchicine 0.6mg q12h for 1 months    #SLE  #rheumatoid arthritis   #antiphospholipid syndrom  #DVT/PE  -YOON was positive but ds DNA was negative   -last note by [facility] onc plan was for warfarin with target INR 3-4 and aspirin 81mg po daily that was switch to lovenox due to cerebral bleeding   -f/u labs: YOON, dsDNA, C3, C4, lupus profile   -CT chest with Right lower lobe segmental pulmonary embolism with calcifications with similar appearance to chest CTA dated 6/24/2022 likely reflecting chronic PE.  -C/w lovenox home dose 110mg QD (home dose 1.5mg/kg daily)   -Rheumatology consult: pending     #HFpEF s/p AICD+PPM   -TTE 06/21/19 EF 63%  -f/u TTE this admission   - C/w metoprolol 100mg QD    #NSTEMI in 2015, subclavian stenosis s/p stent placement   -No longer on ASA 2/2 GI bleed    #Cervical cancer /sp hysterectomy  #Laryngeal cancer s/p chemo, in remission  -outpatient followup     #Chronic pain   -c/w morphine:  morphine ER 60mg 4 times a day standing  morphine IR 15mg q4h PRN     Misc:  - DVT Prophylaxis: Lovenox   - GI Prophylaxis: PPI   - Diet: Diet, DASH/TLC    Pending : PNA/ Clinical improvement   Dispo: Home   Plan d/w the patient at bedside.  52yo female with pmh  of HTN, HLD, COPD on home O2 5L, SLE, antiphospholipid syndrome,  rheumatoid arthritis, cervical cancer sp hysterectomy,  laryngeal cancer s/p CT, RT, HFpEF (06/21/19 EF 63%) s/p AICD+PPM, DVT/PE (on lovenox QD),  NSTEMI in 2015, subclavian stenosis s/p stent placement presenting for pneumonia.    #LLL Bacterial Pneumonia with Chronic hypoxia  #H/o COPD   -CT chest - New partial opacification of the left lower lobe central bronchi and lower lobe consolidation likely representing pneumonia/atelectasis. Recommend follow-up CT chest in 6/8 weeks to assess for resolution.  -c/w Unasyn and doxycycline per ID  (allergic to vantin and moxifloxacin)   -blood cultures NTD  -sputum culture pending  -MRSA nares negative  -procal 0.06  -pulm consulted appreciated  -c/w mucinex bid  -c/w prednisone 40mg qd x 5 days, today is day 3  -c/w albuterol, symbicort inhalers     #Mild-to-moderate pericardial effusion  #r/o pericarditis   -CT with new mild-to-moderate pericardial effusion  -TTE:   LV Ejection Fraction by Hendricks's Method with a biplane EF of 64 %.   Small pericardial effusion. No echocardiographic evidence of   tamponade.  Compared to study dated 6/15/23, there is a pericardial effusion on current study.  -trop negative  -ESR 71, CRP 87.5  -coxsackie pending, will send Adeno PCR   -Cardio consult Dr. Magana - unlikely pericarditis (chest pain is pleuritic, ECG normal, symptoms responded to abx and steroids) - trial colchicine 0.6mg bid x 1 month. If there is real pericarditis, will need for 3 months.  -c/w prednisone 40mg x 5 days (today is day 3)   -c/w colchicine 0.6mg q12h for 1 months    #SLE  #rheumatoid arthritis   #antiphospholipid syndrom  #DVT/PE  -YOON was positive but ds DNA was negative   -last note by [facility] onc plan was for warfarin with target INR 3-4 and aspirin 81mg po daily that was switch to lovenox due to cerebral bleeding   -f/u labs: YOON, dsDNA, C3, C4, lupus profile   -CT chest with Right lower lobe segmental pulmonary embolism with calcifications with similar appearance to chest CTA dated 6/24/2022 likely reflecting chronic PE.  -C/w lovenox home dose 110mg QD (home dose 1.5mg/kg daily)   -Rheumatology consult: pending     #HFpEF s/p AICD+PPM   -TTE 06/21/19 EF 63%  -f/u TTE this admission   - C/w metoprolol 100mg QD    #NSTEMI in 2015, subclavian stenosis s/p stent placement   -No longer on ASA 2/2 GI bleed    #Cervical cancer /sp hysterectomy  #Laryngeal cancer s/p chemo, in remission  -outpatient followup     #Chronic pain   -c/w morphine:  morphine ER 60mg 4 times a day standing  morphine IR 15mg q4h PRN     Misc:  - DVT Prophylaxis: Lovenox   - GI Prophylaxis: PPI   - Diet: Diet, DASH/TLC    Reviewed labs and imaging independently.     Pending : PNA/ Clinical improvement   Dispo: Home   Plan d/w the patient at bedside.

## 2024-05-22 NOTE — PROGRESS NOTE ADULT - ASSESSMENT
IMPRESSION:    Acute hypoxemic resp failure  L side pneumonia/ Pneumonitis   New pericardial effusion  HO SLE/ RA  HO DVT/ chronic PE  HO COPD      PLAN:      - ABX per ID  - Solumedrol 40 q 12  - Neb q 6  - C3, C4, level, rheumato eval  - Echo noted  - steroids  - chronic PE continue AC

## 2024-05-22 NOTE — PROGRESS NOTE ADULT - SUBJECTIVE AND OBJECTIVE BOX
ILEANA MCNEIL  53y, Female  Allergy: Plaquenil (Other)  Avelox (Other)  Arava (Anaphylaxis; Angioedema)  adhesives (Rash; Urticaria)  Vantin (Other (Mild))      LOS  2d    CHIEF COMPLAINT: CAP (22 May 2024 11:06)      INTERVAL EVENTS/HPI  - No acute events overnight continued sharp left sided pleuritic cp   - T(F): , Max: 97.9 (05-22-24 @ 04:57)  - Tolerating medication  - WBC Count: 10.72 (05-22-24 @ 05:45)  WBC Count: 6.96 (05-21-24 @ 06:00)     - Creatinine: 0.9 (05-22-24 @ 05:45)  Creatinine: 0.9 (05-21-24 @ 06:00)       ROS  General: Denies rigors, nightsweats  HEENT: Denies headache, rhinorrhea, sore throat, eye pain  CV: Denies CP, palpitations  PULM: Denies wheezing, hemoptysis  GI: Denies hematemesis, hematochezia, melena  : Denies discharge, hematuria  MSK: Denies arthralgias, myalgias  SKIN: Denies rash, lesions  NEURO: Denies paresthesias, weakness  PSYCH: Denies depression, anxiety    VITALS:  T(F): 97.9, Max: 97.9 (05-22-24 @ 04:57)  HR: 77  BP: 119/70  RR: 18Vital Signs Last 24 Hrs  T(C): 36.6 (22 May 2024 04:57), Max: 36.6 (21 May 2024 21:00)  T(F): 97.9 (22 May 2024 04:57), Max: 97.9 (22 May 2024 04:57)  HR: 77 (22 May 2024 04:57) (77 - 77)  BP: 119/70 (22 May 2024 04:57) (119/70 - 130/56)  BP(mean): --  RR: 18 (22 May 2024 04:57) (18 - 18)  SpO2: 98% (22 May 2024 04:57) (94% - 98%)    Parameters below as of 21 May 2024 21:00  Patient On (Oxygen Delivery Method): room air        PHYSICAL EXAM:  Gen: NAD, resting in bed  HEENT: Normocephalic, atraumatic  Neck: supple, no lymphadenopathy  CV: Regular rate & regular rhythm  Lungs: decreased BS at bases, no fremitus  Abdomen: Soft, BS present  Ext: Warm, well perfused  Neuro: non focal, awake  Skin: no rash, no erythema  Lines: no phlebitis    FH: Non-contributory  Social Hx: Non-contributory    TESTS & MEASUREMENTS:                        10.5   10.72 )-----------( 217      ( 22 May 2024 05:45 )             32.9     05-22    140  |  103  |  16  ----------------------------<  124<H>  3.9   |  25  |  0.9    Ca    8.8      22 May 2024 05:45  Mg     1.6     05-22    TPro  6.1  /  Alb  3.6  /  TBili  0.3  /  DBili  x   /  AST  37  /  ALT  23  /  AlkPhos  96  05-21      LIVER FUNCTIONS - ( 21 May 2024 06:00 )  Alb: 3.6 g/dL / Pro: 6.1 g/dL / ALK PHOS: 96 U/L / ALT: 23 U/L / AST: 37 U/L / GGT: x           Urinalysis Basic - ( 22 May 2024 05:45 )    Color: x / Appearance: x / SG: x / pH: x  Gluc: 124 mg/dL / Ketone: x  / Bili: x / Urobili: x   Blood: x / Protein: x / Nitrite: x   Leuk Esterase: x / RBC: x / WBC x   Sq Epi: x / Non Sq Epi: x / Bacteria: x        Culture - Blood (collected 05-19-24 @ 19:59)  Source: .Blood Blood  Preliminary Report (05-22-24 @ 07:02):    No growth at 48 Hours    Culture - Blood (collected 05-19-24 @ 19:59)  Source: .Blood Blood  Preliminary Report (05-22-24 @ 07:02):    No growth at 48 Hours        Blood Gas Venous - Lactate: 1.0 mmol/L (05-19-24 @ 20:21)      INFECTIOUS DISEASES TESTING  MRSA PCR Result.: Negative (05-20-24 @ 23:15)  Legionella Antigen, Urine: Negative (05-20-24 @ 16:53)  Streptococcus pneumoniae Ag, Ur Result: Negative (05-20-24 @ 16:53)  Procalcitonin: 0.06 (05-20-24 @ 06:31)      INFLAMMATORY MARKERS  Sedimentation Rate, Erythrocyte: 71 mm/Hr (05-20-24 @ 11:30)  C-Reactive Protein: 87.5 mg/L (05-20-24 @ 11:30)      RADIOLOGY & ADDITIONAL TESTS:  I have personally reviewed the last available Chest xray  CXR      CT  CT Angio Chest PE Protocol w/ IV Cont:   ACC: [id number] EXAM:  CT ANGIO CHEST PULM ART WAWI   ORDERED BY: ANNIKA HUTCHISON     PROCEDURE DATE:  05/19/2024          INTERPRETATION:  CLINICAL INDICATION: Hypoxia.    TECHNIQUE:  CTA of the thorax was performed after administration of contrast per the   PE protocol. Sagittal and coronal reformats were performed as well as MIP   reconstructions.  Intravenous contrast: 100 cc Omnipaque 350    COMPARISON: CT chest 4/11/2024. CTA chest 6/24/2022.    INTERPRETATION:    PULMONARY ARTERIES: Right lower lobe segmental pulmonary embolism with   calcifications which were seen on prior CT chest dated 4/11/2024 likely   reflecting chronic PE.    AIRWAYS/LUNGS/PLEURA: New partial opacification of the left lower lobe   central bronchi  Left lower lobe consolidative opacity with air   bronchograms and adjacent effusion, new. There is no pneumothorax.    MEDIASTINUM: Nonspecific subcentimeter mediastinal lymph nodes likely   reactive    HEART AND VESSELS: The heart is normal in size. There is no evidence of   right heart strain. Cardiac pacing device in the left chest.  Mild to   moderate pericardial effusion.  Left subclavian stent.    UPPER ABDOMEN: Cholecystectomy.    BONES AND SOFT TISSUES: No acute osseous abnormalities.  Collateral   vessel formation    IMPRESSION:    Right lower lobe segmental pulmonary embolism with calcifications with   similar appearance to chest CTA dated 6/24/2022 likely reflecting chronic   PE.    New partial opacification of the left lower lobe central bronchi and   lower lobe consolidation likely representing pneumonia/atelectasis.   Recommend follow-up CT chest in 6/8 weeks to assess for resolution.    Mild to moderate pericardial effusion, new.    Communication: The summary of above findings were discussed with readback   confirmation with Dr. Loya by radiology resident Rodolfo Ogden on   5/19/2024 at 10:50 PM.    --- End of Report ---          RODOLFO OGDEN MD; Resident Radiologist  This document has been electronically signed.  PARVEZ PANG; Attending Radiologist  This document has been electronically signed. May 19 2024 11:48PM (05-19-24 @ 21:09)      CARDIOLOGY TESTING  12 Lead ECG:   Ventricular Rate 82 BPM    Atrial Rate 82 BPM    P-R Interval 158 ms    QRS Duration 70 ms    Q-T Interval 362 ms    QTC Calculation(Bazett) 422 ms    P Axis 65 degrees    R Axis 48 degrees    T Axis 28 degrees    Diagnosis Line Normal sinus rhythm  Low voltage QRS  Nonspecific T wave abnormality  Abnormal ECG    Confirmed by Kathryn Estrada MD ([id number]) on 5/20/2024 10:31:50 AM (05-19-24 @ 19:53)      MEDICATIONS  ampicillin/sulbactam  IVPB     ampicillin/sulbactam  IVPB 3 IV Intermittent every 6 hours  budesonide  80 MICROgram(s)/formoterol 4.5 MICROgram(s) Inhaler 2 Inhalation two times a day  cholecalciferol 2000 Oral daily  colchicine 0.6 Oral every 12 hours  doxycycline IVPB 100 IV Intermittent every 12 hours  doxycycline IVPB     enoxaparin Injectable 110 SubCutaneous every 24 hours  folic acid 1 Oral daily  guaiFENesin  Oral every 12 hours  metoprolol succinate  Oral daily  morphine ER Tablet 60 Oral four times a day  naloxegol 25 Oral daily  pantoprazole    Tablet 40 Oral before breakfast  predniSONE   Tablet 40 Oral daily  senna 2 Oral at bedtime  tiotropium 2.5 MICROgram(s) Inhaler 2 Inhalation daily      WEIGHT  Weight (kg): 76 (05-20-24 @ 05:16)  Creatinine: 0.9 mg/dL (05-22-24 @ 05:45)      ANTIBIOTICS:  ampicillin/sulbactam  IVPB 3 Gram(s) IV Intermittent every 6 hours  ampicillin/sulbactam  IVPB      doxycycline IVPB      doxycycline IVPB 100 milliGRAM(s) IV Intermittent every 12 hours      All available historical records have been reviewed

## 2024-05-22 NOTE — CONSULT NOTE ADULT - ASSESSMENT
54 y/o F   PMHx  of HTN, HLD, COPD on home O2,  cervical cancer sp hysterectomy,  laryngeal cancer s/p CT, RT, HFpEF (06/21/19 EF 63%) s/p AICD+PPM, DVT/PE (on lovenox QD),  NSTEMI in 2015, subclavian stenosis s/p stent placement  Rheum Hx : SLE, lupus anticoagulant, antiphospholipid syndrome,  rheumatoid arthritis,     Presenting for fever and pleuritic chest pain from pneumonia and possible pericarditis     Assessment :     # New mild-mod pericardial effusion Likely cause of pleuritic chest pain likely due to acute pericarditis   # Hx NSTEMI in 2015, subclavian vein thrmobosis followed by stenosis s/p stent placement  - No longer on ASA 2/2 GI bleed  # Hx  HFpEF (06/21/19 EF 63%) s/p AICD+PPM   - ESR: 71           - CRP: 87            - Coxsackie :pending   - per cardio pericarditis is debatable, but will do a period of therapy with Colchicine (chest pain is pleuritic, ECG is normal, symptoms responded to antibiotic and steroid) - c/w Colchicine 0.6 mg bid for 1 month and steroids for pneumonia   - Trop negative*2   - Echocardio:pending  - Started on prednisone for pneumonia that will help in pericarditis (need slow taper 10mg every week to prevent recurrence )   - Cont colchicine 0.6mg po bid d:2   - Started on Ibuprofen prn that was stopped (recommend against NSAID due to her PMHx)     # SLE /RA/ APLS/DVT/PE  - YOON 5/2022 :  1:320  - ds DNA 5/21 this admission: negative   - 5/2022 - SSA,SSB, RNP, C/P ANCA negative   * heme onc plan was for warfarin with target INR 3-4 and aspirin 81mg po daily that was switch to lovenox due to cerebral bleeding   - YOON:pending      - C3:pending        - C4:pending   - C/w lovenox home dose 110mg QD (home dose 1.5mg/kg daily)     #  Hx Cervical cancer sp hysterectomy,  laryngeal cancer s/p CT on remission  - Follow up as outpatient       Plan :          54 y/o F   PMHx  of HTN, HLD, COPD on home O2,  cervical cancer sp hysterectomy,  laryngeal cancer s/p CT, RT, HFpEF (06/21/19 EF 63%) s/p AICD+PPM, DVT/PE (on lovenox QD),  NSTEMI in 2015, subclavian stenosis s/p stent placement  Rheum Hx : SLE, lupus anticoagulant, antiphospholipid syndrome,  rheumatoid arthritis,     Presenting for fever and pleuritic chest pain from pneumonia and possible pericarditis     Assessment :     # New mild-mod pericardial effusion Likely cause of pleuritic chest pain likely due to acute pericarditis   # Hx NSTEMI in 2015, subclavian vein thrmobosis followed by stenosis s/p stent placement  - No longer on ASA 2/2 GI bleed  # Hx  HFpEF (06/21/19 EF 63%) s/p AICD+PPM   - ESR: 71           - CRP: 87            - Coxsackie :pending   - per cardio pericarditis is debatable, but will do a period of therapy with Colchicine (chest pain is pleuritic, ECG is normal, symptoms responded to antibiotic and steroid) - c/w Colchicine 0.6 mg bid for 1 month and steroids for pneumonia   - Trop negative*2   - Echocardio:pending  - Started on prednisone for pneumonia that will help in pericarditis (need slow taper 10mg every week to prevent recurrence )   - Cont colchicine 0.6mg po bid d:2   - Started on Ibuprofen prn that was stopped (recommend against NSAID due to her PMHx)     # SLE   # RA  # APLS on Fondaparinux   # Hx DVT/PE  - Diagnosed with SLE at age 19   - previously saw Dr. Fraire, Kemar Carroll, and Rober,   - Was on MTX, Arava, HCQ, Benlysta, MMF without improvement in symptoms.   - Had adverse reaction to HCQ and Arava - fatigue and joint stiffness.     - YOON 5/2022 :  1:320  - ds DNA 5/21 this admission: negative   - 5/2022 - SSA,SSB, RNP, C/P ANCA negative   * heme onc plan was for warfarin with target INR 3-4 and aspirin 81mg po daily that was switch to lovenox due to cerebral bleeding   - YOON:pending      - C3:pending        - C4:pending   - C/w lovenox home dose 110mg QD (home dose 1.5mg/kg daily)     #  Hx Cervical cancer sp hysterectomy,  laryngeal cancer s/p CT on remission  - Follow up as outpatient       diagnosed with SLE at age 19 she previously saw Dr. Fraire, Kemar Carroll, and Rboer, Was on MTX, Arava, HCQ, Benlysta, MMF without improvement in symptoms. Had adverse reaction to HCQ and Arava. Main symptoms are fatigue and joint stiffness. Denies mucositis, hair loss, photosensitivity, rash, sicca symptoms, joint swelling. Exam with LLE calf with tender ecchymotic lesion. Labs with normal ESR, CRP 14, no leukocytosis, anemia, or thrombocytopenia, CMP normal, UA moderate blood, blood cultures negative,  bilateral LE arterial duplex normal, LUE venus duplex acute DVT, LUE arterial duplex normal. Possible vasculitis skin lesion vs. cellulitis vs. abscess. She has fatigue and joint stiffness both chronic and refractory to prior treatments for SLE, otherwise no acute symptoms of SLE. No systemic symptoms of ANCA vasculitis.   -Check SLE labs and ANCA as above  -F/u CT LLE and CTA LLE with IV contrast  -Skin biopsy to r/o vasculitis  -Continue antibiotics.         Plan :          54 y/o F   PMHx  of HTN, HLD, COPD on home O2,  cervical cancer sp hysterectomy,  laryngeal cancer s/p CT, RT, HFpEF (06/21/19 EF 63%) s/p AICD+PPM, DVT/PE (on lovenox QD),  NSTEMI in 2015, subclavian stenosis s/p stent placement  Rheum Hx : SLE, lupus anticoagulant, antiphospholipid syndrome,  rheumatoid arthritis,     Presenting for fever and pleuritic chest pain from pneumonia and possible pericarditis     Assessment :     # New mild-mod pericardial effusion Likely cause of pleuritic chest pain likely due to acute pericarditis   # Hx NSTEMI in 2015, subclavian vein thrmobosis followed by stenosis s/p stent placement  - No longer on ASA 2/2 GI bleed  # Hx  HFpEF (06/21/19 EF 63%) s/p AICD+PPM   - ESR: 71           - CRP: 87            - Coxsackie :pending   - per cardio pericarditis is debatable, but will do a period of therapy with Colchicine (chest pain is pleuritic, ECG is normal, symptoms responded to antibiotic and steroid) - c/w Colchicine 0.6 mg bid for 1 month and steroids for pneumonia   - Trop negative*2   - Echocardio 5/21 : EF 64%, Small pericardial effusion  - Started on prednisone for pneumonia that will help in pericarditis (need slow taper 10mg every week to prevent recurrence)   - Cont colchicine 0.6mg po bid d:2   - Started on Ibuprofen prn that was stopped (recommend against NSAID due to her PMHx)     # SLE - not on medication  # RA  # APLS on Lovenox 110 qd   # Hx DVT/PE    - Diagnosed with SLE at age 19   - Follows with Dr. Fraire  - prev on HCQ, Benlysta, MMF without improvement in symptoms and some SE   - currently off all medication for lupus for the past 4y   - YOON 5/2022 :  1:320  - ds DNA 5/21 this admission: negative   - 5/2022 - SSA,SSB, RNP, C/P ANCA negative     - Of all RA meds for the past 7y   - Was on MTX, Arava  - Currently having joint pains in the fingers     - Diagnosed with APLS in 2022 - was following with Dr Sultana now with Dr Ortiz at [facility]  - Hx of 3x DVT 2012,2019,2022  - Hx of chronic PE   - Has 2 children , no Hx of miscarriage   - heme onc plan was for warfarin with target INR 3-4 and aspirin 81mg po daily that was switch to lovenox due to cerebral and ankle bleeding ep July 2023  - On lovenox home dose 110mg QD (home dose 1.5mg/kg daily)     #  Hx Cervical cancer sp hysterectomy 2000,  laryngeal cancer s/p rad - 2002   - Both cancer in remission  - Follow up as outpatient         Plan : (pending attending verification)  - f/u YOON, C3, C4 , Coags ,   - C/w lovenox home dose 110mg QD (home dose 1.5mg/kg daily)   - c/w prednisone for pneumonia that will help in pericarditis ( slow taper 10mg every week reduction)  - Cont colchicine 0.6mg po bid d:2   - Hold Ibuprofen prn   54 y/o F   PMHx  of HTN, HLD, COPD on home O2,  cervical cancer sp hysterectomy,  laryngeal cancer s/p CT, RT, HFpEF (06/21/19 EF 63%) s/p AICD+PPM, DVT/PE (on lovenox QD),  NSTEMI in 2015, subclavian stenosis s/p stent placement  Rheum Hx : SLE, lupus anticoagulant, antiphospholipid syndrome,  rheumatoid arthritis,     Presenting for fever and pleuritic chest pain from pneumonia and possible pericarditis     Assessment :     # Chest pain  # New mild-mod pericardial effusion   # Hx NSTEMI in 2015, subclavian vein thrmobosis followed by stenosis s/p stent placement  - No longer on ASA 2/2 GI bleed  # Hx  HFpEF (06/21/19 EF 63%) s/p AICD+PPM   - ESR: 71, CRP: 87   - Coxsackie :pending   - per cardio pericarditis is debatable, but will do a period of therapy with Colchicine (chest pain is pleuritic, ECG is normal, symptoms responded to antibiotic and steroid) - c/w Colchicine 0.6 mg bid for 1 month and steroids for pneumonia   - Trop negative*2   - Echocardio 5/21 : EF 64%, Small pericardial effusion  - Started on prednisone for pneumonia that will help in pericarditis (need slow taper 10mg every week to prevent recurrence)   - Cont colchicine 0.6mg po bid d:2   - Started on Ibuprofen prn that was stopped (recommend against NSAID due to her PMHx)     # SLE - not on medication  # RA - not on medication   # APLS on Lovenox 110 qd ( Hx DVT/PE )  - Active syx : chest pain, leg swelling , no rash   - Hx of Raynaud type syx on / off   - Diagnosed with SLE at age 19   - Follows with Dr. Fraire - updated at bedside   - prev on HCQ, Benlysta, MMF without improvement in symptoms and some SE   - currently off all medication for lupus for the past 4y   - YOON 5/2022 :  1:320  - ds DNA 5/21 this admission: negative   - 5/2022 - SSA,SSB, RNP, C/P ANCA negative     - Of all RA meds for the past 7y   - Was on MTX, Arava  - Currently having joint pains in the fingers     - Diagnosed with APLS in 2022 - was following with Dr Sultana now with Dr Ortiz at [place]  - Hx of 3x DVT 2012,2019,2022  - Hx of chronic PE   - Has 2 children , no Hx of miscarriage   - heme onc plan was for warfarin with target INR 3-4 and aspirin 81mg po daily that was switch to lovenox due to cerebral and ankle bleeding ep July 2023  - On lovenox home dose 110mg QD (home dose 1.5mg/kg daily)     #  Hx Cervical cancer sp hysterectomy 2000,  laryngeal cancer s/p rad - 2002   - Both cancer in remission  - Follow up as outpatient       Plan : (pending attending verification)  - f/u UA, RF, CCP, Mckeon, RNP, SCL70, Anti-centromere, RNA theo 3, TSH, CK, EBV, CMV, RVP, HIV, Parvovirus, Hep B , RPR, QuantiFeron IgG subclasses, RO/LA, ANCA  - c/w lovenox home dose 110mg QD (home dose 1.5mg/kg daily)   - c/w prednisone for pneumonia that will help in pericarditis ( slow taper 10mg every week reduction)  - c/w colchicine 0.6mg po bid   - Hold NSAIDS

## 2024-05-23 ENCOUNTER — TRANSCRIPTION ENCOUNTER (OUTPATIENT)
Age: 54
End: 2024-05-23

## 2024-05-23 LAB
ANION GAP SERPL CALC-SCNC: 11 MMOL/L — SIGNIFICANT CHANGE UP (ref 7–14)
B2 GLYCOPROT1 AB SER QL: POSITIVE
BASOPHILS # BLD AUTO: 0.02 K/UL — SIGNIFICANT CHANGE UP (ref 0–0.2)
BASOPHILS NFR BLD AUTO: 0.2 % — SIGNIFICANT CHANGE UP (ref 0–1)
BUN SERPL-MCNC: 18 MG/DL — SIGNIFICANT CHANGE UP (ref 10–20)
CALCIUM SERPL-MCNC: 9.1 MG/DL — SIGNIFICANT CHANGE UP (ref 8.4–10.4)
CARDIOLIPIN AB SER-ACNC: POSITIVE
CHLORIDE SERPL-SCNC: 101 MMOL/L — SIGNIFICANT CHANGE UP (ref 98–110)
CK MB BLD-MCNC: NEGATIVE TITER — SIGNIFICANT CHANGE UP
CK SERPL-CCNC: 44 U/L — SIGNIFICANT CHANGE UP (ref 0–225)
CO2 SERPL-SCNC: 25 MMOL/L — SIGNIFICANT CHANGE UP (ref 17–32)
COXSACKIE TYPE A-24: NEGATIVE TITER — SIGNIFICANT CHANGE UP
CREAT SERPL-MCNC: 0.9 MG/DL — SIGNIFICANT CHANGE UP (ref 0.7–1.5)
CV A24 IGG TITR SER IF: NEGATIVE TITER — SIGNIFICANT CHANGE UP
CV A7 AB SER-ACNC: NEGATIVE TITER — SIGNIFICANT CHANGE UP
CV A9 AB TITR FLD: NEGATIVE TITER — SIGNIFICANT CHANGE UP
CV B1 AB TITR FLD: HIGH
CV B2 AB TITR FLD: HIGH
CV B3 AB TITR FLD: HIGH
CV B4 AB TITR FLD: HIGH
CV B5 AB TITR FLD: HIGH
CV B6 AB TITR FLD: HIGH
EGFR: 76 ML/MIN/1.73M2 — SIGNIFICANT CHANGE UP
EOSINOPHIL # BLD AUTO: 0.06 K/UL — SIGNIFICANT CHANGE UP (ref 0–0.7)
EOSINOPHIL NFR BLD AUTO: 0.5 % — SIGNIFICANT CHANGE UP (ref 0–8)
GLUCOSE SERPL-MCNC: 97 MG/DL — SIGNIFICANT CHANGE UP (ref 70–99)
HCT VFR BLD CALC: 32.1 % — LOW (ref 37–47)
HGB BLD-MCNC: 10.4 G/DL — LOW (ref 12–16)
IMM GRANULOCYTES NFR BLD AUTO: 0.9 % — HIGH (ref 0.1–0.3)
LYMPHOCYTES # BLD AUTO: 34.5 % — SIGNIFICANT CHANGE UP (ref 20.5–51.1)
LYMPHOCYTES # BLD AUTO: 4.04 K/UL — HIGH (ref 1.2–3.4)
MAGNESIUM SERPL-MCNC: 1.6 MG/DL — LOW (ref 1.8–2.4)
MCHC RBC-ENTMCNC: 29.6 PG — SIGNIFICANT CHANGE UP (ref 27–31)
MCHC RBC-ENTMCNC: 32.4 G/DL — SIGNIFICANT CHANGE UP (ref 32–37)
MCV RBC AUTO: 91.5 FL — SIGNIFICANT CHANGE UP (ref 81–99)
MONOCYTES # BLD AUTO: 0.94 K/UL — HIGH (ref 0.1–0.6)
MONOCYTES NFR BLD AUTO: 8 % — SIGNIFICANT CHANGE UP (ref 1.7–9.3)
NEUTROPHILS # BLD AUTO: 6.55 K/UL — HIGH (ref 1.4–6.5)
NEUTROPHILS NFR BLD AUTO: 55.9 % — SIGNIFICANT CHANGE UP (ref 42.2–75.2)
NRBC # BLD: 0 /100 WBCS — SIGNIFICANT CHANGE UP (ref 0–0)
PLATELET # BLD AUTO: 235 K/UL — SIGNIFICANT CHANGE UP (ref 130–400)
PMV BLD: 11.2 FL — HIGH (ref 7.4–10.4)
POTASSIUM SERPL-MCNC: 3.7 MMOL/L — SIGNIFICANT CHANGE UP (ref 3.5–5)
POTASSIUM SERPL-SCNC: 3.7 MMOL/L — SIGNIFICANT CHANGE UP (ref 3.5–5)
RBC # BLD: 3.51 M/UL — LOW (ref 4.2–5.4)
RBC # FLD: 13.8 % — SIGNIFICANT CHANGE UP (ref 11.5–14.5)
SODIUM SERPL-SCNC: 137 MMOL/L — SIGNIFICANT CHANGE UP (ref 135–146)
TSH SERPL-MCNC: 2.65 UIU/ML — SIGNIFICANT CHANGE UP (ref 0.27–4.2)
WBC # BLD: 11.71 K/UL — HIGH (ref 4.8–10.8)
WBC # FLD AUTO: 11.71 K/UL — HIGH (ref 4.8–10.8)

## 2024-05-23 PROCEDURE — 71045 X-RAY EXAM CHEST 1 VIEW: CPT | Mod: 26

## 2024-05-23 PROCEDURE — 99233 SBSQ HOSP IP/OBS HIGH 50: CPT

## 2024-05-23 PROCEDURE — 99232 SBSQ HOSP IP/OBS MODERATE 35: CPT

## 2024-05-23 RX ORDER — FUROSEMIDE 40 MG
40 TABLET ORAL ONCE
Refills: 0 | Status: COMPLETED | OUTPATIENT
Start: 2024-05-23 | End: 2024-05-23

## 2024-05-23 RX ORDER — IPRATROPIUM/ALBUTEROL SULFATE 18-103MCG
3 AEROSOL WITH ADAPTER (GRAM) INHALATION EVERY 6 HOURS
Refills: 0 | Status: DISCONTINUED | OUTPATIENT
Start: 2024-05-23 | End: 2024-05-26

## 2024-05-23 RX ORDER — LOPERAMIDE HCL 2 MG
2 TABLET ORAL ONCE
Refills: 0 | Status: COMPLETED | OUTPATIENT
Start: 2024-05-23 | End: 2024-05-23

## 2024-05-23 RX ADMIN — Medication 40 MILLIGRAM(S): at 06:23

## 2024-05-23 RX ADMIN — Medication 600 MILLIGRAM(S): at 06:24

## 2024-05-23 RX ADMIN — MORPHINE SULFATE 15 MILLIGRAM(S): 50 CAPSULE, EXTENDED RELEASE ORAL at 16:45

## 2024-05-23 RX ADMIN — PANTOPRAZOLE SODIUM 40 MILLIGRAM(S): 20 TABLET, DELAYED RELEASE ORAL at 06:24

## 2024-05-23 RX ADMIN — MORPHINE SULFATE 60 MILLIGRAM(S): 50 CAPSULE, EXTENDED RELEASE ORAL at 00:07

## 2024-05-23 RX ADMIN — MORPHINE SULFATE 60 MILLIGRAM(S): 50 CAPSULE, EXTENDED RELEASE ORAL at 11:52

## 2024-05-23 RX ADMIN — ENOXAPARIN SODIUM 110 MILLIGRAM(S): 100 INJECTION SUBCUTANEOUS at 03:29

## 2024-05-23 RX ADMIN — TIOTROPIUM BROMIDE 2 PUFF(S): 18 CAPSULE ORAL; RESPIRATORY (INHALATION) at 07:36

## 2024-05-23 RX ADMIN — MORPHINE SULFATE 60 MILLIGRAM(S): 50 CAPSULE, EXTENDED RELEASE ORAL at 11:22

## 2024-05-23 RX ADMIN — Medication 0.6 MILLIGRAM(S): at 06:24

## 2024-05-23 RX ADMIN — MORPHINE SULFATE 60 MILLIGRAM(S): 50 CAPSULE, EXTENDED RELEASE ORAL at 06:59

## 2024-05-23 RX ADMIN — Medication 2000 UNIT(S): at 11:23

## 2024-05-23 RX ADMIN — MORPHINE SULFATE 60 MILLIGRAM(S): 50 CAPSULE, EXTENDED RELEASE ORAL at 18:09

## 2024-05-23 RX ADMIN — Medication 0.6 MILLIGRAM(S): at 17:39

## 2024-05-23 RX ADMIN — MORPHINE SULFATE 60 MILLIGRAM(S): 50 CAPSULE, EXTENDED RELEASE ORAL at 23:46

## 2024-05-23 RX ADMIN — BUDESONIDE AND FORMOTEROL FUMARATE DIHYDRATE 2 PUFF(S): 160; 4.5 AEROSOL RESPIRATORY (INHALATION) at 07:36

## 2024-05-23 RX ADMIN — MORPHINE SULFATE 60 MILLIGRAM(S): 50 CAPSULE, EXTENDED RELEASE ORAL at 06:23

## 2024-05-23 RX ADMIN — Medication 100 MILLIGRAM(S): at 06:23

## 2024-05-23 RX ADMIN — Medication 40 MILLIGRAM(S): at 19:58

## 2024-05-23 RX ADMIN — Medication 100 MILLIGRAM(S): at 06:24

## 2024-05-23 RX ADMIN — AMPICILLIN SODIUM AND SULBACTAM SODIUM 200 GRAM(S): 250; 125 INJECTION, POWDER, FOR SUSPENSION INTRAMUSCULAR; INTRAVENOUS at 11:23

## 2024-05-23 RX ADMIN — AMPICILLIN SODIUM AND SULBACTAM SODIUM 200 GRAM(S): 250; 125 INJECTION, POWDER, FOR SUSPENSION INTRAMUSCULAR; INTRAVENOUS at 00:07

## 2024-05-23 RX ADMIN — AMPICILLIN SODIUM AND SULBACTAM SODIUM 200 GRAM(S): 250; 125 INJECTION, POWDER, FOR SUSPENSION INTRAMUSCULAR; INTRAVENOUS at 23:46

## 2024-05-23 RX ADMIN — Medication 3 MILLILITER(S): at 21:51

## 2024-05-23 RX ADMIN — MORPHINE SULFATE 15 MILLIGRAM(S): 50 CAPSULE, EXTENDED RELEASE ORAL at 17:15

## 2024-05-23 RX ADMIN — Medication 100 MILLIGRAM(S): at 17:37

## 2024-05-23 RX ADMIN — AMPICILLIN SODIUM AND SULBACTAM SODIUM 200 GRAM(S): 250; 125 INJECTION, POWDER, FOR SUSPENSION INTRAMUSCULAR; INTRAVENOUS at 06:24

## 2024-05-23 RX ADMIN — Medication 2 MILLIGRAM(S): at 06:23

## 2024-05-23 RX ADMIN — MORPHINE SULFATE 60 MILLIGRAM(S): 50 CAPSULE, EXTENDED RELEASE ORAL at 17:39

## 2024-05-23 RX ADMIN — Medication 600 MILLIGRAM(S): at 17:39

## 2024-05-23 RX ADMIN — Medication 1 MILLIGRAM(S): at 11:22

## 2024-05-23 RX ADMIN — AMPICILLIN SODIUM AND SULBACTAM SODIUM 200 GRAM(S): 250; 125 INJECTION, POWDER, FOR SUSPENSION INTRAMUSCULAR; INTRAVENOUS at 17:37

## 2024-05-23 RX ADMIN — MORPHINE SULFATE 60 MILLIGRAM(S): 50 CAPSULE, EXTENDED RELEASE ORAL at 00:37

## 2024-05-23 RX ADMIN — Medication 1 MILLIGRAM(S): at 16:24

## 2024-05-23 NOTE — DISCHARGE NOTE NURSING/CASE MANAGEMENT/SOCIAL WORK - PATIENT PORTAL LINK FT
You can access the FollowMyHealth Patient Portal offered by [facility] by registering at the following website: [email or web address]. By joining [facility]’s FollowMyHealth portal, you will also be able to view your health information using other applications (apps) compatible with our system.

## 2024-05-23 NOTE — PROGRESS NOTE ADULT - ASSESSMENT
52yo female with pmh  of HTN, HLD, COPD on home O2 5L, SLE, antiphospholipid syndrome,  rheumatoid arthritis, cervical cancer sp hysterectomy,  laryngeal cancer s/p CT, RT, HFpEF (06/21/19 EF 63%) s/p AICD+PPM, DVT/PE (on lovenox QD),  NSTEMI in 2015, subclavian stenosis s/p stent placement presenting for pneumonia.    #LLL Bacterial Pneumonia with Chronic hypoxia  #H/o COPD   -CT chest - New partial opacification of the left lower lobe central bronchi and lower lobe consolidation likely representing pneumonia/atelectasis. Recommend follow-up CT chest in 6/8 weeks to assess for resolution.  -c/w Unasyn and doxycycline per ID  (allergic to vantin and moxifloxacin)   -blood cultures NTD  -sputum culture pending  -MRSA nares negative  -procal 0.06  -pulm consulted appreciated  -c/w mucinex bid  -c/w prednisone 40mg qd x 5 days,   -c/w albuterol, symbicort inhalers     #Mild-to-moderate pericardial effusion  #r/o pericarditis   -CT with new mild-to-moderate pericardial effusion  -TTE:   LV Ejection Fraction by Hendricks's Method with a biplane EF of 64 %.   Small pericardial effusion. No echocardiographic evidence of   tamponade.  Compared to study dated 6/15/23, there is a pericardial effusion on current study.  -trop negative  -ESR 71, CRP 87.5  -coxsackie pending, will send Adeno PCR   -Cardio consult Dr. Magana - unlikely pericarditis (chest pain is pleuritic, ECG normal, symptoms responded to abx and steroids) - trial colchicine 0.6mg bid x 1 month.   -c/w prednisone 40mg x 5 days    -c/w colchicine 0.6mg q12h for 1 months    #SLE  #rheumatoid arthritis   #antiphospholipid syndrom  #DVT/PE  -YOON was positive but ds DNA was negative   -last note by heme onc plan was for warfarin with target INR 3-4 and aspirin 81mg po daily that was switch to lovenox due to cerebral bleeding   -f/u labs: YOON, dsDNA, C3, C4, lupus profile   -CT chest with Right lower lobe segmental pulmonary embolism with calcifications with similar appearance to chest CTA dated 6/24/2022 likely reflecting chronic PE.  -C/w lovenox home dose 110mg QD (home dose 1.5mg/kg daily)   -Rheumatology consult: pending     #HFpEF s/p AICD+PPM   -TTE 06/21/19 EF 63%  -f/u TTE this admission   - C/w metoprolol 100mg QD    #NSTEMI in 2015, subclavian stenosis s/p stent placement   -No longer on ASA 2/2 GI bleed    #Cervical cancer /sp hysterectomy  #Laryngeal cancer s/p chemo, in remission  -outpatient followup     #Chronic pain   -c/w morphine:  morphine ER 60mg 4 times a day standing  morphine IR 15mg q4h PRN     Misc:  - DVT Prophylaxis: Lovenox   - GI Prophylaxis: PPI   - Diet: Diet, DASH/TLC    Reviewed labs and imaging independently.     Pending : PNA/ Clinical improvement   Dispo: Home   Plan d/w the patient at bedside.  54yo female with pmh  of HTN, HLD, COPD on home O2 5L, SLE, antiphospholipid syndrome,  rheumatoid arthritis, cervical cancer sp hysterectomy,  laryngeal cancer s/p CT, RT, HFpEF (06/21/19 EF 63%) s/p AICD+PPM, DVT/PE (on lovenox QD),  NSTEMI in 2015, subclavian stenosis s/p stent placement presenting for pneumonia.    #LLL Bacterial Pneumonia with Chronic hypoxia  #H/o COPD   -CT chest - New partial opacification of the left lower lobe central bronchi and lower lobe consolidation likely representing pneumonia/atelectasis. Recommend follow-up CT chest in 6/8 weeks to assess for resolution.  -c/w Unasyn and doxycycline per ID  (allergic to vantin and moxifloxacin)   -blood cultures NTD  -sputum culture pending  -MRSA nares negative  -procal 0.06  -pulm consulted appreciated  -c/w mucinex bid  -c/w prednisone 40mg qd x 5 days,   -c/w albuterol, symbicort inhalers     #Mild-to-moderate pericardial effusion  #r/o pericarditis   -CT with new mild-to-moderate pericardial effusion  -TTE:   LV Ejection Fraction by Hendricks's Method with a biplane EF of 64 %.   Small pericardial effusion. No echocardiographic evidence of   tamponade.  Compared to study dated 6/15/23, there is a pericardial effusion on current study.  -trop negative  -ESR 71, CRP 87.5  -coxsackie serology >> Positive    -Cardio consult Dr. Magana - unlikely pericarditis (chest pain is pleuritic, ECG normal, symptoms responded to abx and steroids) - trial colchicine 0.6mg bid x 1 month.   -c/w prednisone 40mg x 5 days      #SLE  #rheumatoid arthritis   #antiphospholipid syndrom  #DVT/PE  -YOON was positive but ds DNA was negative   -last note by heme onc plan was for warfarin with target INR 3-4 and aspirin 81mg po daily that was switch to lovenox due to cerebral bleeding   -f/u labs: YOON, dsDNA, C3, C4, lupus profile   -CT chest with Right lower lobe segmental pulmonary embolism with calcifications with similar appearance to chest CTA dated 6/24/2022 likely reflecting chronic PE.  -C/w lovenox home dose 110mg QD (home dose 1.5mg/kg daily)   -Rheumatology consult: pending     #HFpEF s/p AICD+PPM   -TTE 06/21/19 EF 63%  -f/u TTE this admission   - C/w metoprolol 100mg QD    #NSTEMI in 2015, subclavian stenosis s/p stent placement   -No longer on ASA 2/2 GI bleed    #Cervical cancer /sp hysterectomy  #Laryngeal cancer s/p chemo, in remission  -outpatient followup     #Chronic pain   -c/w morphine:  morphine ER 60mg 4 times a day standing  morphine IR 15mg q4h PRN     Misc:  - DVT Prophylaxis: Lovenox   - GI Prophylaxis: PPI   - Diet: Diet, DASH/TLC    Reviewed labs and imaging independently.     Pending : PNA/ pericarditis/ Clinical improvement   Dispo: Home   Plan d/w the patient at bedside.

## 2024-05-23 NOTE — DISCHARGE NOTE NURSING/CASE MANAGEMENT/SOCIAL WORK - NSDCPEFALRISK_GEN_ALL_CORE
For information on Fall & Injury Prevention, visit: [email or web address] OR  [email or web address] OR  https://www.cdc.gov/steadi/patient.html

## 2024-05-23 NOTE — PROGRESS NOTE ADULT - ASSESSMENT
IMPRESSION:    Acute hypoxemic resp failure  L side pneumonia/ Pneumonitis   New pericardial effusion/ echo noted  HO SLE/ RA  HO DVT/ chronic PE  HO COPD      PLAN:      - ABX per ID  - Steroids  - Neb q 6  - rheumato fup  - Echo noted  - repeat CXR   - chronic PE continue AC

## 2024-05-23 NOTE — PROGRESS NOTE ADULT - SUBJECTIVE AND OBJECTIVE BOX
ILEANA MCNEIL  53y  Female      Patient is a 53y old  Female who presents with a chief complaint of CAP.     INTERVAL HPI/OVERNIGHT EVENTS:      ******************************* REVIEW OF SYSTEMS:**********************************************    All other review of systems negative    *********************** VITALS ******************************************    T(F): 98.2 (05-23-24 @ 04:35)  HR: 78 (05-23-24 @ 04:35) (75 - 103)  BP: 112/62 (05-23-24 @ 04:35) (112/62 - 161/77)  RR: 18 (05-23-24 @ 04:35) (18 - 20)  SpO2: 96% (05-23-24 @ 04:35) (94% - 97%)            ******************************** PHYSICAL EXAM:**************************************************  GENERAL: NAD    PSYCH: no agitation, baseline mentation  HEENT:     NERVOUS SYSTEM:  Alert & Oriented X3,   PULMONARY: CHRISTOPHER, CTA    CARDIOVASCULAR: S1S2 RRR    GI: Soft, NT, ND; BS present.    EXTREMITIES:  2+ Peripheral Pulses, No clubbing, cyanosis, or edema    LYMPH: No lymphadenopathy noted    SKIN: No rashes or lesions      **************************** LABS *******************************************************                          10.4   11.71 )-----------( 235      ( 23 May 2024 04:30 )             32.1     05-23    137  |  101  |  18  ----------------------------<  97  3.7   |  25  |  0.9    Ca    9.1      23 May 2024 04:30  Mg     1.6     05-23        Urinalysis Basic - ( 23 May 2024 04:30 )    Color: x / Appearance: x / SG: x / pH: x  Gluc: 97 mg/dL / Ketone: x  / Bili: x / Urobili: x   Blood: x / Protein: x / Nitrite: x   Leuk Esterase: x / RBC: x / WBC x   Sq Epi: x / Non Sq Epi: x / Bacteria: x        Lactate Trend        CAPILLARY BLOOD GLUCOSE              **************************Active Medications *******************************************  Plaquenil (Other)  Avelox (Other)  Arava (Anaphylaxis; Angioedema)  adhesives (Rash; Urticaria)  Vantin (Other (Mild))      acetaminophen     Tablet .. 650 milliGRAM(s) Oral every 6 hours PRN  albuterol    90 MICROgram(s) HFA Inhaler 2 Puff(s) Inhalation every 6 hours PRN  ALPRAZolam 1 milliGRAM(s) Oral every 6 hours PRN  aluminum hydroxide/magnesium hydroxide/simethicone Suspension 30 milliLiter(s) Oral every 4 hours PRN  ampicillin/sulbactam  IVPB      ampicillin/sulbactam  IVPB 3 Gram(s) IV Intermittent every 6 hours  budesonide  80 MICROgram(s)/formoterol 4.5 MICROgram(s) Inhaler 2 Puff(s) Inhalation two times a day  cholecalciferol 2000 Unit(s) Oral daily  colchicine 0.6 milliGRAM(s) Oral every 12 hours  diphenhydrAMINE Injectable 50 milliGRAM(s) IV Push once PRN  doxycycline IVPB 100 milliGRAM(s) IV Intermittent every 12 hours  doxycycline IVPB      enoxaparin Injectable 110 milliGRAM(s) SubCutaneous every 24 hours  folic acid 1 milliGRAM(s) Oral daily  guaiFENesin  milliGRAM(s) Oral every 12 hours  melatonin 3 milliGRAM(s) Oral at bedtime PRN  metoprolol succinate  milliGRAM(s) Oral daily  morphine  IR 15 milliGRAM(s) Oral every 4 hours PRN  morphine ER Tablet 60 milliGRAM(s) Oral four times a day  naloxegol 25 milliGRAM(s) Oral daily  ondansetron Injectable 4 milliGRAM(s) IV Push every 8 hours PRN  pantoprazole    Tablet 40 milliGRAM(s) Oral before breakfast  predniSONE   Tablet 40 milliGRAM(s) Oral daily  senna 2 Tablet(s) Oral at bedtime  tiotropium 2.5 MICROgram(s) Inhaler 2 Puff(s) Inhalation daily      ***************************************************  RADIOLOGY & ADDITIONAL TESTS:    Imaging Personally Reviewed:  [ ] YES  [ ] NO    HEALTH ISSUES - PROBLEM Dx:

## 2024-05-23 NOTE — PROGRESS NOTE ADULT - SUBJECTIVE AND OBJECTIVE BOX
Over Night Events: events noted, cough, no fever, rheumato./ ID noted, still co pain    PHYSICAL EXAM    ICU Vital Signs Last 24 Hrs  T(C): 36.8 (23 May 2024 04:35), Max: 36.8 (23 May 2024 04:35)  T(F): 98.2 (23 May 2024 04:35), Max: 98.2 (23 May 2024 04:35)  HR: 78 (23 May 2024 04:35) (75 - 103)  BP: 112/62 (23 May 2024 04:35) (112/62 - 161/77)  RR: 18 (23 May 2024 04:35) (18 - 20)  SpO2: 96% (23 May 2024 04:35) (94% - 97%)    O2 Parameters below as of 23 May 2024 04:35  Patient On (Oxygen Delivery Method): room air            General: ill looking  tachypneic  Lungs: dec bs both bases  Cardiovascular: Regular   Abdomen: Soft, Positive BS  Extremities: No clubbing   Neurological: Non focal         LABS:                          10.5   10.72 )-----------( 217      ( 22 May 2024 05:45 )             32.9                                               05-22    140  |  103  |  16  ----------------------------<  124<H>  3.9   |  25  |  0.9    Ca    8.8      22 May 2024 05:45  Mg     1.6     05-22        PTT - ( 21 May 2024 11:50 )  PTT:60.4 sec                                       Urinalysis Basic - ( 22 May 2024 05:45 )    Color: x / Appearance: x / SG: x / pH: x  Gluc: 124 mg/dL / Ketone: x  / Bili: x / Urobili: x   Blood: x / Protein: x / Nitrite: x   Leuk Esterase: x / RBC: x / WBC x   Sq Epi: x / Non Sq Epi: x / Bacteria: x                                                                                                                                                                                MEDICATIONS  (STANDING):  ampicillin/sulbactam  IVPB 3 Gram(s) IV Intermittent every 6 hours  ampicillin/sulbactam  IVPB      budesonide  80 MICROgram(s)/formoterol 4.5 MICROgram(s) Inhaler 2 Puff(s) Inhalation two times a day  cholecalciferol 2000 Unit(s) Oral daily  colchicine 0.6 milliGRAM(s) Oral every 12 hours  doxycycline IVPB      doxycycline IVPB 100 milliGRAM(s) IV Intermittent every 12 hours  enoxaparin Injectable 110 milliGRAM(s) SubCutaneous every 24 hours  folic acid 1 milliGRAM(s) Oral daily  guaiFENesin  milliGRAM(s) Oral every 12 hours  metoprolol succinate  milliGRAM(s) Oral daily  morphine ER Tablet 60 milliGRAM(s) Oral four times a day  naloxegol 25 milliGRAM(s) Oral daily  pantoprazole    Tablet 40 milliGRAM(s) Oral before breakfast  predniSONE   Tablet 40 milliGRAM(s) Oral daily  senna 2 Tablet(s) Oral at bedtime  tiotropium 2.5 MICROgram(s) Inhaler 2 Puff(s) Inhalation daily    MEDICATIONS  (PRN):  acetaminophen     Tablet .. 650 milliGRAM(s) Oral every 6 hours PRN Temp greater or equal to 38C (100.4F), Mild Pain (1 - 3)  albuterol    90 MICROgram(s) HFA Inhaler 2 Puff(s) Inhalation every 6 hours PRN Shortness of Breath and/or Wheezing  ALPRAZolam 1 milliGRAM(s) Oral every 6 hours PRN Anxiety  aluminum hydroxide/magnesium hydroxide/simethicone Suspension 30 milliLiter(s) Oral every 4 hours PRN Dyspepsia  diphenhydrAMINE Injectable 50 milliGRAM(s) IV Push once PRN Allergy symptoms  melatonin 3 milliGRAM(s) Oral at bedtime PRN Insomnia  morphine  IR 15 milliGRAM(s) Oral every 4 hours PRN Severe Pain (7 - 10)  ondansetron Injectable 4 milliGRAM(s) IV Push every 8 hours PRN Nausea and/or Vomiting

## 2024-05-24 LAB
ANA TITR SER: NEGATIVE — SIGNIFICANT CHANGE UP
ANION GAP SERPL CALC-SCNC: 16 MMOL/L — HIGH (ref 7–14)
ANTI-RIBONUCLEAR PROTEIN: <0.2 AI — SIGNIFICANT CHANGE UP
BASOPHILS # BLD AUTO: 0.06 K/UL — SIGNIFICANT CHANGE UP (ref 0–0.2)
BASOPHILS NFR BLD AUTO: 0.5 % — SIGNIFICANT CHANGE UP (ref 0–1)
BUN SERPL-MCNC: 19 MG/DL — SIGNIFICANT CHANGE UP (ref 10–20)
CALCIUM SERPL-MCNC: 9.5 MG/DL — SIGNIFICANT CHANGE UP (ref 8.4–10.4)
CENTROMERE AB SER-ACNC: <0.2 AI — SIGNIFICANT CHANGE UP
CHLORIDE SERPL-SCNC: 94 MMOL/L — LOW (ref 98–110)
CMV IGM FLD-ACNC: <8 AU/ML — SIGNIFICANT CHANGE UP
CMV IGM SERPL QL: NEGATIVE — SIGNIFICANT CHANGE UP
CO2 SERPL-SCNC: 26 MMOL/L — SIGNIFICANT CHANGE UP (ref 17–32)
CREAT SERPL-MCNC: 1 MG/DL — SIGNIFICANT CHANGE UP (ref 0.7–1.5)
DSDNA AB FLD-ACNC: <0.2 AI — SIGNIFICANT CHANGE UP
EBV EA AB SER IA-ACNC: <5 U/ML — SIGNIFICANT CHANGE UP
EBV EA AB TITR SER IF: POSITIVE
EBV EA IGG SER-ACNC: NEGATIVE — SIGNIFICANT CHANGE UP
EBV NA IGG SER IA-ACNC: 173 U/ML — HIGH
EBV PATRN SPEC IB-IMP: SIGNIFICANT CHANGE UP
EBV VCA IGG AVIDITY SER QL IA: POSITIVE
EBV VCA IGM SER IA-ACNC: 13 U/ML — SIGNIFICANT CHANGE UP
EBV VCA IGM SER IA-ACNC: >750 U/ML — HIGH
EBV VCA IGM TITR FLD: NEGATIVE — SIGNIFICANT CHANGE UP
EGFR: 67 ML/MIN/1.73M2 — SIGNIFICANT CHANGE UP
ENA SCL70 AB SER-ACNC: <0.2 AI — SIGNIFICANT CHANGE UP
ENA SM AB FLD QL: <0.2 AI — SIGNIFICANT CHANGE UP
ENA SS-A AB FLD IA-ACNC: <0.2 AI — SIGNIFICANT CHANGE UP
EOSINOPHIL # BLD AUTO: 0.14 K/UL — SIGNIFICANT CHANGE UP (ref 0–0.7)
EOSINOPHIL NFR BLD AUTO: 1.2 % — SIGNIFICANT CHANGE UP (ref 0–8)
GI PCR PANEL: SIGNIFICANT CHANGE UP
GLUCOSE SERPL-MCNC: 122 MG/DL — HIGH (ref 70–99)
HBV CORE AB SER-ACNC: SIGNIFICANT CHANGE UP
HBV SURFACE AB SER-ACNC: <3 MIU/ML — LOW
HBV SURFACE AG SER-ACNC: SIGNIFICANT CHANGE UP
HCT VFR BLD CALC: 37.1 % — SIGNIFICANT CHANGE UP (ref 37–47)
HGB BLD-MCNC: 12.1 G/DL — SIGNIFICANT CHANGE UP (ref 12–16)
HIV 1+2 AB+HIV1 P24 AG SERPL QL IA: SIGNIFICANT CHANGE UP
IMM GRANULOCYTES NFR BLD AUTO: 1.2 % — HIGH (ref 0.1–0.3)
LYMPHOCYTES # BLD AUTO: 3.61 K/UL — HIGH (ref 1.2–3.4)
LYMPHOCYTES # BLD AUTO: 31.1 % — SIGNIFICANT CHANGE UP (ref 20.5–51.1)
MAGNESIUM SERPL-MCNC: 1.3 MG/DL — LOW (ref 1.8–2.4)
MCHC RBC-ENTMCNC: 28.5 PG — SIGNIFICANT CHANGE UP (ref 27–31)
MCHC RBC-ENTMCNC: 32.6 G/DL — SIGNIFICANT CHANGE UP (ref 32–37)
MCV RBC AUTO: 87.5 FL — SIGNIFICANT CHANGE UP (ref 81–99)
MONOCYTES # BLD AUTO: 0.98 K/UL — HIGH (ref 0.1–0.6)
MONOCYTES NFR BLD AUTO: 8.4 % — SIGNIFICANT CHANGE UP (ref 1.7–9.3)
NEUTROPHILS # BLD AUTO: 6.69 K/UL — HIGH (ref 1.4–6.5)
NEUTROPHILS NFR BLD AUTO: 57.6 % — SIGNIFICANT CHANGE UP (ref 42.2–75.2)
NRBC # BLD: 0 /100 WBCS — SIGNIFICANT CHANGE UP (ref 0–0)
PLATELET # BLD AUTO: 289 K/UL — SIGNIFICANT CHANGE UP (ref 130–400)
PMV BLD: 11 FL — HIGH (ref 7.4–10.4)
POTASSIUM SERPL-MCNC: 3.6 MMOL/L — SIGNIFICANT CHANGE UP (ref 3.5–5)
POTASSIUM SERPL-SCNC: 3.6 MMOL/L — SIGNIFICANT CHANGE UP (ref 3.5–5)
RBC # BLD: 4.24 M/UL — SIGNIFICANT CHANGE UP (ref 4.2–5.4)
RBC # FLD: 13.7 % — SIGNIFICANT CHANGE UP (ref 11.5–14.5)
RHEUMATOID FACT SERPL-ACNC: <10 IU/ML — SIGNIFICANT CHANGE UP (ref 0–13)
SODIUM SERPL-SCNC: 136 MMOL/L — SIGNIFICANT CHANGE UP (ref 135–146)
WBC # BLD: 11.62 K/UL — HIGH (ref 4.8–10.8)
WBC # FLD AUTO: 11.62 K/UL — HIGH (ref 4.8–10.8)

## 2024-05-24 PROCEDURE — 99233 SBSQ HOSP IP/OBS HIGH 50: CPT

## 2024-05-24 PROCEDURE — 99232 SBSQ HOSP IP/OBS MODERATE 35: CPT

## 2024-05-24 RX ORDER — MAGNESIUM SULFATE 500 MG/ML
2 VIAL (ML) INJECTION
Refills: 0 | Status: COMPLETED | OUTPATIENT
Start: 2024-05-24 | End: 2024-05-24

## 2024-05-24 RX ORDER — MORPHINE SULFATE 50 MG/1
15 CAPSULE, EXTENDED RELEASE ORAL EVERY 8 HOURS
Refills: 0 | Status: DISCONTINUED | OUTPATIENT
Start: 2024-05-24 | End: 2024-05-24

## 2024-05-24 RX ORDER — FUROSEMIDE 40 MG
40 TABLET ORAL ONCE
Refills: 0 | Status: COMPLETED | OUTPATIENT
Start: 2024-05-24 | End: 2024-05-24

## 2024-05-24 RX ADMIN — Medication 600 MILLIGRAM(S): at 05:29

## 2024-05-24 RX ADMIN — Medication 3 MILLILITER(S): at 05:07

## 2024-05-24 RX ADMIN — MORPHINE SULFATE 60 MILLIGRAM(S): 50 CAPSULE, EXTENDED RELEASE ORAL at 23:01

## 2024-05-24 RX ADMIN — Medication 1 MILLIGRAM(S): at 06:59

## 2024-05-24 RX ADMIN — Medication 600 MILLIGRAM(S): at 17:38

## 2024-05-24 RX ADMIN — Medication 1 MILLIGRAM(S): at 23:01

## 2024-05-24 RX ADMIN — AMPICILLIN SODIUM AND SULBACTAM SODIUM 200 GRAM(S): 250; 125 INJECTION, POWDER, FOR SUSPENSION INTRAMUSCULAR; INTRAVENOUS at 11:26

## 2024-05-24 RX ADMIN — MORPHINE SULFATE 60 MILLIGRAM(S): 50 CAPSULE, EXTENDED RELEASE ORAL at 05:35

## 2024-05-24 RX ADMIN — Medication 100 MILLIGRAM(S): at 05:30

## 2024-05-24 RX ADMIN — Medication 1 MILLIGRAM(S): at 11:23

## 2024-05-24 RX ADMIN — MORPHINE SULFATE 60 MILLIGRAM(S): 50 CAPSULE, EXTENDED RELEASE ORAL at 06:05

## 2024-05-24 RX ADMIN — Medication 100 MILLIGRAM(S): at 17:38

## 2024-05-24 RX ADMIN — Medication 2000 UNIT(S): at 11:23

## 2024-05-24 RX ADMIN — Medication 0.6 MILLIGRAM(S): at 05:29

## 2024-05-24 RX ADMIN — Medication 25 GRAM(S): at 11:22

## 2024-05-24 RX ADMIN — BUDESONIDE AND FORMOTEROL FUMARATE DIHYDRATE 2 PUFF(S): 160; 4.5 AEROSOL RESPIRATORY (INHALATION) at 07:56

## 2024-05-24 RX ADMIN — MORPHINE SULFATE 60 MILLIGRAM(S): 50 CAPSULE, EXTENDED RELEASE ORAL at 00:16

## 2024-05-24 RX ADMIN — AMPICILLIN SODIUM AND SULBACTAM SODIUM 200 GRAM(S): 250; 125 INJECTION, POWDER, FOR SUSPENSION INTRAMUSCULAR; INTRAVENOUS at 23:03

## 2024-05-24 RX ADMIN — MORPHINE SULFATE 60 MILLIGRAM(S): 50 CAPSULE, EXTENDED RELEASE ORAL at 11:30

## 2024-05-24 RX ADMIN — AMPICILLIN SODIUM AND SULBACTAM SODIUM 200 GRAM(S): 250; 125 INJECTION, POWDER, FOR SUSPENSION INTRAMUSCULAR; INTRAVENOUS at 17:37

## 2024-05-24 RX ADMIN — Medication 0.6 MILLIGRAM(S): at 17:38

## 2024-05-24 RX ADMIN — TIOTROPIUM BROMIDE 2 PUFF(S): 18 CAPSULE ORAL; RESPIRATORY (INHALATION) at 07:55

## 2024-05-24 RX ADMIN — AMPICILLIN SODIUM AND SULBACTAM SODIUM 200 GRAM(S): 250; 125 INJECTION, POWDER, FOR SUSPENSION INTRAMUSCULAR; INTRAVENOUS at 05:30

## 2024-05-24 RX ADMIN — PANTOPRAZOLE SODIUM 40 MILLIGRAM(S): 20 TABLET, DELAYED RELEASE ORAL at 05:29

## 2024-05-24 RX ADMIN — NALOXEGOL OXALATE 25 MILLIGRAM(S): 12.5 TABLET, FILM COATED ORAL at 11:23

## 2024-05-24 RX ADMIN — BUDESONIDE AND FORMOTEROL FUMARATE DIHYDRATE 2 PUFF(S): 160; 4.5 AEROSOL RESPIRATORY (INHALATION) at 20:02

## 2024-05-24 RX ADMIN — ENOXAPARIN SODIUM 110 MILLIGRAM(S): 100 INJECTION SUBCUTANEOUS at 02:44

## 2024-05-24 RX ADMIN — Medication 25 GRAM(S): at 09:00

## 2024-05-24 RX ADMIN — Medication 3 MILLILITER(S): at 14:07

## 2024-05-24 RX ADMIN — Medication 40 MILLIGRAM(S): at 05:29

## 2024-05-24 RX ADMIN — MORPHINE SULFATE 60 MILLIGRAM(S): 50 CAPSULE, EXTENDED RELEASE ORAL at 17:37

## 2024-05-24 NOTE — PROGRESS NOTE ADULT - SUBJECTIVE AND OBJECTIVE BOX
24H events:    Patient is a 53y old Female who presents with a chief complaint of CAP (24 May 2024 08:31)    Primary diagnosis of Pneumonia      Day 1:  Day 2:  Day 3:     Today is hospital day 4d. This morning patient was seen and examined at bedside, resting comfortably in bed.    No acute or major events overnight.    Code Status:    Family communication:  Contact date:  Name of person contacted:  Relationship to patient:  Communication details:  What matters most:    PAST MEDICAL & SURGICAL HISTORY  Lupus    RA (rheumatoid arthritis)    HTN (hypertension)    CHF (congestive heart failure)    COPD (chronic obstructive pulmonary disease)    DVT (deep venous thrombosis)    Pulmonary embolism    History of laryngeal cancer    GERD (gastroesophageal reflux disease)    Cervical cancer    APS (antiphospholipid syndrome)    S/P appendectomy    S/P cholecystectomy    AICD (automatic cardioverter/defibrillator) present  [employer]    S/P hysterectomy    History of back surgery    H/O foot surgery    S/P eye surgery    Subclavian arterial stenosis      SOCIAL HISTORY:  Social History:      ALLERGIES:  Plaquenil (Other)  Avelox (Other)  Arava (Anaphylaxis; Angioedema)  adhesives (Rash; Urticaria)  Vantin (Other (Mild))    MEDICATIONS:  STANDING MEDICATIONS  albuterol/ipratropium for Nebulization 3 milliLiter(s) Nebulizer every 6 hours  ampicillin/sulbactam  IVPB 3 Gram(s) IV Intermittent every 6 hours  ampicillin/sulbactam  IVPB      budesonide  80 MICROgram(s)/formoterol 4.5 MICROgram(s) Inhaler 2 Puff(s) Inhalation two times a day  cholecalciferol 2000 Unit(s) Oral daily  colchicine 0.6 milliGRAM(s) Oral every 12 hours  doxycycline IVPB      doxycycline IVPB 100 milliGRAM(s) IV Intermittent every 12 hours  enoxaparin Injectable 110 milliGRAM(s) SubCutaneous every 24 hours  folic acid 1 milliGRAM(s) Oral daily  furosemide   Injectable 40 milliGRAM(s) IV Push once  guaiFENesin  milliGRAM(s) Oral every 12 hours  magnesium sulfate  IVPB 2 Gram(s) IV Intermittent every 2 hours  metoprolol succinate  milliGRAM(s) Oral daily  morphine ER Tablet 60 milliGRAM(s) Oral four times a day  naloxegol 25 milliGRAM(s) Oral daily  pantoprazole    Tablet 40 milliGRAM(s) Oral before breakfast  predniSONE   Tablet 40 milliGRAM(s) Oral daily  senna 2 Tablet(s) Oral at bedtime  tiotropium 2.5 MICROgram(s) Inhaler 2 Puff(s) Inhalation daily    PRN MEDICATIONS  acetaminophen     Tablet .. 650 milliGRAM(s) Oral every 6 hours PRN  albuterol    90 MICROgram(s) HFA Inhaler 2 Puff(s) Inhalation every 6 hours PRN  ALPRAZolam 1 milliGRAM(s) Oral every 6 hours PRN  aluminum hydroxide/magnesium hydroxide/simethicone Suspension 30 milliLiter(s) Oral every 4 hours PRN  diphenhydrAMINE Injectable 50 milliGRAM(s) IV Push once PRN  melatonin 3 milliGRAM(s) Oral at bedtime PRN  morphine  IR 15 milliGRAM(s) Oral every 8 hours PRN  ondansetron Injectable 4 milliGRAM(s) IV Push every 8 hours PRN    VITALS:   T(F): 98.5  HR: 83  BP: 126/71  RR: 19  SpO2: 97%    PHYSICAL EXAM:  GENERAL:   (  ) NAD, lying in bed comfortably     (  ) obtunded     (  ) lethargic     (  ) somnolent    HEAD:   (  ) Atraumatic     (  ) hematoma     (  ) laceration (specify location:       )     NECK:  (  ) Supple     (  ) neck stiffness     (  ) nuchal rigidity     (  )  no JVD     (  ) JVD present ( -- cm)    HEART:  Rate -->     (  ) normal rate     (  ) bradycardic     (  ) tachycardic  Rhythm -->     (  ) regular     (  ) regularly irregular     (  ) irregularly irregular  Murmurs -->     (  ) normal s1s2     (  ) systolic murmur     (  ) diastolic murmur     (  ) continuous murmur      (  ) S3 present     (  ) S4 present    LUNGS:   (  )Unlabored respirations     (  ) tachypnea  (  ) B/L air entry     (  ) decreased breath sounds in:  (location     )    (  ) no adventitious sound     (  ) crackles     (  ) wheezing      (  ) rhonchi      (specify location:       )  (  ) chest wall tenderness (specify location:       )    ABDOMEN:   (  ) Soft     (  ) tense   |   (  ) nondistended     (  ) distended   |   (  ) +BS     (  ) hypoactive bowel sounds     (  ) hyperactive bowel sounds  (  ) nontender     (  ) RUQ tenderness     (  ) RLQ tenderness     (  ) LLQ tenderness     (  ) epigastric tenderness     (  ) diffuse tenderness  (  ) Splenomegaly      (  ) Hepatomegaly      (  ) Jaundice     (  ) ecchymosis     EXTREMITIES:  (  ) Normal     (  ) Rash     (  ) ecchymosis     (  ) varicose veins      (  ) pitting edema     (  ) non-pitting edema   (  ) ulceration     (  ) gangrene:     (location:     )    NERVOUS SYSTEM:    (  ) A&Ox3     (  ) confused     (  ) lethargic  CN II-XII:     (  ) Intact     (  ) deficits found     (Specify:     )   Upper extremities:     (  ) no sensorimotor deficits     (  ) weakness     (  ) loss of proprioception/vibration     (  ) loss of touch/temperature (specify:    )  Lower extremities:     (  ) no sensorimotor deficits     (  ) weakness     (  ) loss of proprioception/vibration     (  ) loss of touch/temperature (specify:    )    SKIN:   (  ) No rashes or lesions     (  ) maculopapular rash     (  ) pustules     (  ) vesicles     (  ) ulcer     (  ) ecchymosis     (specify location:     )    AMPAC score:    (  ) Indwelling Rodarte Catheter:   Date insterted:    Reason (  ) Critical illness     (  ) urinary retention    (  ) Accurate Ins/Outs Monitoring     (  ) CMO patient    (  ) Central Line:   Date inserted:  Location: (  ) Right IJ     (  ) Left IJ     (  ) Right Fem     (  ) Left Fem    (  ) SPC        (  ) pigtail       (  ) PEG tube       (  ) colostomy       (  ) jejunostomy  (  ) U-Dall    LABS:                        12.1   11.62 )-----------( 289      ( 24 May 2024 09:18 )             37.1     05-24    136  |  94<L>  |  19  ----------------------------<  122<H>  3.6   |  26  |  1.0    Ca    9.5      24 May 2024 09:18  Mg     1.3     05-24        Urinalysis Basic - ( 24 May 2024 09:18 )    Color: x / Appearance: x / SG: x / pH: x  Gluc: 122 mg/dL / Ketone: x  / Bili: x / Urobili: x   Blood: x / Protein: x / Nitrite: x   Leuk Esterase: x / RBC: x / WBC x   Sq Epi: x / Non Sq Epi: x / Bacteria: x        Creatine Kinase, Serum: 44 U/L (05-23-24 @ 11:50)      CARDIAC MARKERS ( 23 May 2024 11:50 )  x     / x     / 44 U/L / x     / x          RADIOLOGY:

## 2024-05-24 NOTE — PROGRESS NOTE ADULT - ASSESSMENT
54yo female with pmh  of HTN, HLD, COPD on home O2 5L, SLE, antiphospholipid syndrome,  rheumatoid arthritis, cervical cancer sp hysterectomy,  laryngeal cancer s/p CT, RT, HFpEF (06/21/19 EF 63%) s/p AICD+PPM, DVT/PE (on lovenox QD),  NSTEMI in 2015, subclavian stenosis s/p stent placement presenting for pneumonia.    #LLL Bacterial Pneumonia with Chronic hypoxia  #H/o COPD   -CT chest - New partial opacification of the left lower lobe central bronchi and lower lobe consolidation likely representing pneumonia/atelectasis. Recommend follow-up CT chest in 6/8 weeks to assess for resolution.  -c/w Unasyn and doxycycline per ID  (allergic to vantin and moxifloxacin)   -blood cultures NTD  -sputum culture pending  -MRSA nares negative  -procal 0.06  -pulm following  -c/w mucinex bid  -s/p IV solumedrol   -c/w prednisone 40mg qd x 5 days - today is day 4  -c/w albuterol, symbicort inhalers   - 5/23 ovn - patient with dyspnea, CXR showed worsening right sided opacity/effusion -> pt given IV lasix 40mg with improvement in symptoms  - Give another 40mg IV lasix today    #Mild-to-moderate pericardial effusion  #r/o pericarditis   -CT with new mild-to-moderate pericardial effusion  -TTE:   LV Ejection Fraction by Hendricks's Method with a biplane EF of 64 %.   Small pericardial effusion. No echocardiographic evidence of   tamponade.  Compared to study dated 6/15/23, there is a pericardial effusion on current study.  -trop negative  -ESR 71, CRP 87.5  -coxsackie serology >> Positive    -Cardio consult Dr. Magana - unlikely pericarditis (chest pain is pleuritic, ECG normal, symptoms responded to abx and steroids) - trial colchicine 0.6mg bid x 1 month.   -c/w prednisone 40mg x 5 days  - today is day 4  -c/w colchicine 0.6mg BID x 1 month    #SLE  #rheumatoid arthritis   #antiphospholipid syndrom  #DVT/PE  -YOON was positive but ds DNA was negative   -last note by heme onc plan was for warfarin with target INR 3-4 and aspirin 81mg po daily that was switch to lovenox due to cerebral bleeding   -CT chest with Right lower lobe segmental pulmonary embolism with calcifications with similar appearance to chest CTA dated 6/24/2022 likely reflecting chronic PE.  -C/w lovenox home dose 110mg QD (home dose 1.5mg/kg daily)   -Rheumatology consult appreciated:  low suspicion that pt's pericardial effusion is related to SLE as pt has had recent labs for SLE with no e/o disease, and no exam findings suggestive of active SLE. It is possible that her pericardial effusion could be related to her antiphospholipid syndrome, but I would recommend ruling out other causes of pericardial effusion as well.   -F/u labs per rheum: urinalysis, cyclic citrullinated peptide, rheumatoid factor, GALLITO antibody, Sjogren's antibody, creatine kinase, scleroderma antibody, centromere antibody, RNA polymerase III antibody, Coxsackie virus titers, TSH, EBV, CMV, HIV, parvovirus, hepatitis B titers, RPR, quantiferon, IgG subclasses, ANCA  - Continue prednisone 40 mg q day  - Continue colchicine 0.6 mg BID  - Continue Lovenox for pt's antiphospholipid syndrome .    #HFpEF s/p AICD+PPM   -TTE 06/21/19 EF 63%  -f/u TTE this admission   - C/w metoprolol 100mg QD    #NSTEMI in 2015, subclavian stenosis s/p stent placement   -No longer on ASA 2/2 GI bleed    #Cervical cancer /sp hysterectomy  #Laryngeal cancer s/p chemo, in remission  -outpatient followup     #Chronic pain   -c/w morphine:  morphine ER 60mg 4 times a day standing  morphine IR 15mg q4h PRN - will discontinue today     Misc:  - DVT Prophylaxis: Lovenox   - GI Prophylaxis: PPI   - Diet: Diet, DASH/TLC

## 2024-05-24 NOTE — PROGRESS NOTE ADULT - SUBJECTIVE AND OBJECTIVE BOX
ILEANA MCNEIL  53y  Female      Patient is a 53y old  Female who presents with a chief complaint of CAP.    INTERVAL HPI/OVERNIGHT EVENTS:      ******************************* REVIEW OF SYSTEMS:**********************************************  feeling better today.   All other review of systems negative    *********************** VITALS ******************************************    T(F): 98.5 (05-24-24 @ 05:07)  HR: 83 (05-24-24 @ 05:07) (77 - 83)  BP: 126/71 (05-24-24 @ 05:07) (126/71 - 135/87)  RR: 19 (05-24-24 @ 05:07) (18 - 19)  SpO2: 97% (05-24-24 @ 07:49) (94% - 97%)            ******************************** PHYSICAL EXAM:**************************************************  GENERAL: NAD    PSYCH: no agitation, baseline mentation  HEENT:     NERVOUS SYSTEM:  Alert & Oriented X3,     PULMONARY: CHRISTOPHER, decreased wheezing     CARDIOVASCULAR: S1S2 RRR    GI: Soft, NT, ND; BS present.    EXTREMITIES:  2+ Peripheral Pulses, No clubbing, cyanosis, LE  edema     LYMPH: No lymphadenopathy noted    SKIN: No rashes or lesions      **************************** LABS *******************************************************                          12.1   11.62 )-----------( 289      ( 24 May 2024 09:18 )             37.1     05-24    136  |  94<L>  |  19  ----------------------------<  122<H>  3.6   |  26  |  1.0    Ca    9.5      24 May 2024 09:18  Mg     1.3     05-24        Urinalysis Basic - ( 24 May 2024 09:18 )    Color: x / Appearance: x / SG: x / pH: x  Gluc: 122 mg/dL / Ketone: x  / Bili: x / Urobili: x   Blood: x / Protein: x / Nitrite: x   Leuk Esterase: x / RBC: x / WBC x   Sq Epi: x / Non Sq Epi: x / Bacteria: x        Lactate Trend    CARDIAC MARKERS ( 23 May 2024 11:50 )  x     / x     / 44 U/L / x     / x          CAPILLARY BLOOD GLUCOSE              **************************Active Medications *******************************************  Plaquenil (Other)  Avelox (Other)  Arava (Anaphylaxis; Angioedema)  adhesives (Rash; Urticaria)  Vantin (Other (Mild))      acetaminophen     Tablet .. 650 milliGRAM(s) Oral every 6 hours PRN  albuterol    90 MICROgram(s) HFA Inhaler 2 Puff(s) Inhalation every 6 hours PRN  albuterol/ipratropium for Nebulization 3 milliLiter(s) Nebulizer every 6 hours  ALPRAZolam 1 milliGRAM(s) Oral every 6 hours PRN  aluminum hydroxide/magnesium hydroxide/simethicone Suspension 30 milliLiter(s) Oral every 4 hours PRN  ampicillin/sulbactam  IVPB      ampicillin/sulbactam  IVPB 3 Gram(s) IV Intermittent every 6 hours  budesonide  80 MICROgram(s)/formoterol 4.5 MICROgram(s) Inhaler 2 Puff(s) Inhalation two times a day  cholecalciferol 2000 Unit(s) Oral daily  colchicine 0.6 milliGRAM(s) Oral every 12 hours  diphenhydrAMINE Injectable 50 milliGRAM(s) IV Push once PRN  doxycycline IVPB 100 milliGRAM(s) IV Intermittent every 12 hours  doxycycline IVPB      enoxaparin Injectable 110 milliGRAM(s) SubCutaneous every 24 hours  folic acid 1 milliGRAM(s) Oral daily  guaiFENesin  milliGRAM(s) Oral every 12 hours  melatonin 3 milliGRAM(s) Oral at bedtime PRN  metoprolol succinate  milliGRAM(s) Oral daily  morphine ER Tablet 60 milliGRAM(s) Oral four times a day  naloxegol 25 milliGRAM(s) Oral daily  ondansetron Injectable 4 milliGRAM(s) IV Push every 8 hours PRN  pantoprazole    Tablet 40 milliGRAM(s) Oral before breakfast  predniSONE   Tablet 40 milliGRAM(s) Oral daily  senna 2 Tablet(s) Oral at bedtime  tiotropium 2.5 MICROgram(s) Inhaler 2 Puff(s) Inhalation daily      ***************************************************  RADIOLOGY & ADDITIONAL TESTS:    Imaging Personally Reviewed:  [ ] YES  [ ] NO    HEALTH ISSUES - PROBLEM Dx:

## 2024-05-24 NOTE — PROGRESS NOTE ADULT - ASSESSMENT
IMPRESSION:    Acute hypoxemic resp failure  L side pneumonia/ Pneumonitis   New pericardial effusion/ echo noted/ Coxsackie noted  HO SLE/ RA  HO DVT/ chronic PE  HO COPD      PLAN:      - ABX per ID  - Steroids prednisone taper  - Neb q 6  - rheumato fup  - Echo noted  - repeat CXR   - chronic PE continue AC  - OP fup

## 2024-05-24 NOTE — PROGRESS NOTE ADULT - ASSESSMENT
54yo female with pmh  of HTN, HLD, COPD on home O2 5L, SLE, antiphospholipid syndrome,  rheumatoid arthritis, cervical cancer sp hysterectomy,  laryngeal cancer s/p CT, RT, HFpEF (06/21/19 EF 63%) s/p AICD+PPM, DVT/PE (on lovenox QD),  NSTEMI in 2015, subclavian stenosis s/p stent placement presenting for pneumonia.    #LLL Bacterial Pneumonia with Chronic hypoxia  #H/o COPD   -CT chest - New partial opacification of the left lower lobe central bronchi and lower lobe consolidation likely representing pneumonia/atelectasis. Recommend follow-up CT chest in 6/8 weeks to assess for resolution.  -c/w Unasyn and doxycycline per ID  (allergic to vantin and moxifloxacin)   -blood cultures NTD  -sputum culture pending  -MRSA nares negative  -procal 0.06  -pulm consulted appreciated  -c/w mucinex bid  -c/w prednisone 40mg qd x 5 days,   -c/w albuterol, symbicort inhalers     #Mild-to-moderate pericardial effusion  #r/o pericarditis   -CT with new mild-to-moderate pericardial effusion  -TTE:   LV Ejection Fraction 64 %.   Small pericardial effusion. No echocardiographic evidence of   tamponade.  Compared to study dated 6/15/23, there is a pericardial effusion on current study.  -trop negative  -ESR 71, CRP 87.5  -coxsackie serology >> Positive    -Cardio consult Dr. Magana - unlikely pericarditis (chest pain is pleuritic, ECG normal, symptoms responded to abx and steroids) - trial colchicine 0.6mg bid x 1 month.   -c/w prednisone 40mg x 5 days      #SLE  #rheumatoid arthritis   #antiphospholipid syndrome   #DVT/PE  -YOON was positive but ds DNA was negative   -last note by [facility] onc plan was for warfarin with target INR 3-4 and aspirin 81mg po daily that was switch to lovenox due to cerebral bleeding   -f/u labs: YOON, dsDNA, C3, C4, lupus profile   -CT chest with Right lower lobe segmental pulmonary embolism with calcifications with similar appearance to chest CTA dated 6/24/2022 likely reflecting chronic PE.  -C/w lovenox home dose 110mg QD (home dose 1.5mg/kg daily)   -Rheumatology consult: pending     #HFpEF s/p AICD+PPM   -TTE 06/21/19 EF 63%  - TTE this admission similar EF, small Pericardial effusion   - C/w metoprolol 100mg QD    #NSTEMI in 2015, subclavian stenosis s/p stent placement   -No longer on ASA 2/2 GI bleed    #Cervical cancer /sp hysterectomy  #Laryngeal cancer s/p chemo, in remission  -outpatient followup     #Chronic pain   -c/w morphine:  morphine ER 60mg 4 times a day standing  morphine IR 15mg q4h PRN     Misc:  - DVT Prophylaxis: Lovenox   - GI Prophylaxis: PPI   - Diet: Diet, DASH/TLC    Reviewed labs and imaging independently.     Pending : PNA/ pericarditis/ Clinical improvement   Dispo: Home   Plan d/w the patient at bedside.

## 2024-05-24 NOTE — PROGRESS NOTE ADULT - SUBJECTIVE AND OBJECTIVE BOX
Over Night Events: events noted, feels slightly better, afebrile, still with l sided pleuritic CP    PHYSICAL EXAM    ICU Vital Signs Last 24 Hrs  T(C): 36.9 (24 May 2024 05:07), Max: 36.9 (24 May 2024 05:07)  T(F): 98.5 (24 May 2024 05:07), Max: 98.5 (24 May 2024 05:07)  HR: 83 (24 May 2024 05:07) (77 - 83)  BP: 126/71 (24 May 2024 05:07) (126/71 - 135/87)  RR: 19 (24 May 2024 05:07) (18 - 19)  SpO2: 97% (24 May 2024 07:49) (94% - 97%)    O2 Parameters below as of 24 May 2024 07:49  Patient On (Oxygen Delivery Method): room air            General: Ill looking  RA  Lungs: DEC bs l side  Cardiovascular: Regular   Abdomen: Soft, Positive BS  Extremities: No clubbing   Skin: Warm  Neurological: Non focal         LABS:                          10.4   11.71 )-----------( 235      ( 23 May 2024 04:30 )             32.1                                               05-23    137  |  101  |  18  ----------------------------<  97  3.7   |  25  |  0.9    Ca    9.1      23 May 2024 04:30  Mg     1.6     05-23                                               Urinalysis Basic - ( 23 May 2024 04:30 )    Color: x / Appearance: x / SG: x / pH: x  Gluc: 97 mg/dL / Ketone: x  / Bili: x / Urobili: x   Blood: x / Protein: x / Nitrite: x   Leuk Esterase: x / RBC: x / WBC x   Sq Epi: x / Non Sq Epi: x / Bacteria: x        CARDIAC MARKERS ( 23 May 2024 11:50 )  x     / x     / 44 U/L / x     / x                                                                                                                                                                              MEDICATIONS  (STANDING):  albuterol/ipratropium for Nebulization 3 milliLiter(s) Nebulizer every 6 hours  ampicillin/sulbactam  IVPB      ampicillin/sulbactam  IVPB 3 Gram(s) IV Intermittent every 6 hours  budesonide  80 MICROgram(s)/formoterol 4.5 MICROgram(s) Inhaler 2 Puff(s) Inhalation two times a day  cholecalciferol 2000 Unit(s) Oral daily  colchicine 0.6 milliGRAM(s) Oral every 12 hours  doxycycline IVPB      doxycycline IVPB 100 milliGRAM(s) IV Intermittent every 12 hours  enoxaparin Injectable 110 milliGRAM(s) SubCutaneous every 24 hours  folic acid 1 milliGRAM(s) Oral daily  guaiFENesin  milliGRAM(s) Oral every 12 hours  magnesium sulfate  IVPB 2 Gram(s) IV Intermittent every 2 hours  metoprolol succinate  milliGRAM(s) Oral daily  morphine ER Tablet 60 milliGRAM(s) Oral four times a day  naloxegol 25 milliGRAM(s) Oral daily  pantoprazole    Tablet 40 milliGRAM(s) Oral before breakfast  predniSONE   Tablet 40 milliGRAM(s) Oral daily  senna 2 Tablet(s) Oral at bedtime  tiotropium 2.5 MICROgram(s) Inhaler 2 Puff(s) Inhalation daily    MEDICATIONS  (PRN):  acetaminophen     Tablet .. 650 milliGRAM(s) Oral every 6 hours PRN Temp greater or equal to 38C (100.4F), Mild Pain (1 - 3)  albuterol    90 MICROgram(s) HFA Inhaler 2 Puff(s) Inhalation every 6 hours PRN Shortness of Breath and/or Wheezing  ALPRAZolam 1 milliGRAM(s) Oral every 6 hours PRN Anxiety  aluminum hydroxide/magnesium hydroxide/simethicone Suspension 30 milliLiter(s) Oral every 4 hours PRN Dyspepsia  diphenhydrAMINE Injectable 50 milliGRAM(s) IV Push once PRN Allergy symptoms  melatonin 3 milliGRAM(s) Oral at bedtime PRN Insomnia  morphine  IR 15 milliGRAM(s) Oral every 4 hours PRN Severe Pain (7 - 10)  ondansetron Injectable 4 milliGRAM(s) IV Push every 8 hours PRN Nausea and/or Vomiting

## 2024-05-25 LAB
ANION GAP SERPL CALC-SCNC: 16 MMOL/L — HIGH (ref 7–14)
BASOPHILS # BLD AUTO: 0.03 K/UL — SIGNIFICANT CHANGE UP (ref 0–0.2)
BASOPHILS NFR BLD AUTO: 0.3 % — SIGNIFICANT CHANGE UP (ref 0–1)
BUN SERPL-MCNC: 17 MG/DL — SIGNIFICANT CHANGE UP (ref 10–20)
CALCIUM SERPL-MCNC: 9 MG/DL — SIGNIFICANT CHANGE UP (ref 8.4–10.4)
CHLORIDE SERPL-SCNC: 98 MMOL/L — SIGNIFICANT CHANGE UP (ref 98–110)
CO2 SERPL-SCNC: 23 MMOL/L — SIGNIFICANT CHANGE UP (ref 17–32)
CREAT SERPL-MCNC: 1 MG/DL — SIGNIFICANT CHANGE UP (ref 0.7–1.5)
CULTURE RESULTS: SIGNIFICANT CHANGE UP
EGFR: 67 ML/MIN/1.73M2 — SIGNIFICANT CHANGE UP
EOSINOPHIL # BLD AUTO: 0.01 K/UL — SIGNIFICANT CHANGE UP (ref 0–0.7)
EOSINOPHIL NFR BLD AUTO: 0.1 % — SIGNIFICANT CHANGE UP (ref 0–8)
GLUCOSE SERPL-MCNC: 130 MG/DL — HIGH (ref 70–99)
HCT VFR BLD CALC: 36.7 % — LOW (ref 37–47)
HGB BLD-MCNC: 12 G/DL — SIGNIFICANT CHANGE UP (ref 12–16)
IMM GRANULOCYTES NFR BLD AUTO: 1.4 % — HIGH (ref 0.1–0.3)
LYMPHOCYTES # BLD AUTO: 1.48 K/UL — SIGNIFICANT CHANGE UP (ref 1.2–3.4)
LYMPHOCYTES # BLD AUTO: 12.7 % — LOW (ref 20.5–51.1)
MAGNESIUM SERPL-MCNC: 1.9 MG/DL — SIGNIFICANT CHANGE UP (ref 1.8–2.4)
MCHC RBC-ENTMCNC: 29.5 PG — SIGNIFICANT CHANGE UP (ref 27–31)
MCHC RBC-ENTMCNC: 32.7 G/DL — SIGNIFICANT CHANGE UP (ref 32–37)
MCV RBC AUTO: 90.2 FL — SIGNIFICANT CHANGE UP (ref 81–99)
MONOCYTES # BLD AUTO: 0.43 K/UL — SIGNIFICANT CHANGE UP (ref 0.1–0.6)
MONOCYTES NFR BLD AUTO: 3.7 % — SIGNIFICANT CHANGE UP (ref 1.7–9.3)
NEUTROPHILS # BLD AUTO: 9.57 K/UL — HIGH (ref 1.4–6.5)
NEUTROPHILS NFR BLD AUTO: 81.8 % — HIGH (ref 42.2–75.2)
NRBC # BLD: 0 /100 WBCS — SIGNIFICANT CHANGE UP (ref 0–0)
PLATELET # BLD AUTO: 323 K/UL — SIGNIFICANT CHANGE UP (ref 130–400)
PMV BLD: 10.9 FL — HIGH (ref 7.4–10.4)
POTASSIUM SERPL-MCNC: 4.1 MMOL/L — SIGNIFICANT CHANGE UP (ref 3.5–5)
POTASSIUM SERPL-SCNC: 4.1 MMOL/L — SIGNIFICANT CHANGE UP (ref 3.5–5)
RBC # BLD: 4.07 M/UL — LOW (ref 4.2–5.4)
RBC # FLD: 13.7 % — SIGNIFICANT CHANGE UP (ref 11.5–14.5)
SODIUM SERPL-SCNC: 137 MMOL/L — SIGNIFICANT CHANGE UP (ref 135–146)
SPECIMEN SOURCE: SIGNIFICANT CHANGE UP
T PALLIDUM AB TITR SER: NEGATIVE — SIGNIFICANT CHANGE UP
WBC # BLD: 11.68 K/UL — HIGH (ref 4.8–10.8)
WBC # FLD AUTO: 11.68 K/UL — HIGH (ref 4.8–10.8)

## 2024-05-25 PROCEDURE — 71045 X-RAY EXAM CHEST 1 VIEW: CPT | Mod: 26

## 2024-05-25 PROCEDURE — 99232 SBSQ HOSP IP/OBS MODERATE 35: CPT

## 2024-05-25 RX ORDER — LEVOFLOXACIN 5 MG/ML
750 INJECTION, SOLUTION INTRAVENOUS EVERY 24 HOURS
Refills: 0 | Status: DISCONTINUED | OUTPATIENT
Start: 2024-05-25 | End: 2024-05-25

## 2024-05-25 RX ADMIN — Medication 100 MILLIGRAM(S): at 18:13

## 2024-05-25 RX ADMIN — AMPICILLIN SODIUM AND SULBACTAM SODIUM 200 GRAM(S): 250; 125 INJECTION, POWDER, FOR SUSPENSION INTRAMUSCULAR; INTRAVENOUS at 05:15

## 2024-05-25 RX ADMIN — Medication 100 MILLIGRAM(S): at 05:16

## 2024-05-25 RX ADMIN — MORPHINE SULFATE 60 MILLIGRAM(S): 50 CAPSULE, EXTENDED RELEASE ORAL at 12:05

## 2024-05-25 RX ADMIN — MORPHINE SULFATE 60 MILLIGRAM(S): 50 CAPSULE, EXTENDED RELEASE ORAL at 11:35

## 2024-05-25 RX ADMIN — Medication 1 MILLIGRAM(S): at 12:46

## 2024-05-25 RX ADMIN — Medication 3 MILLILITER(S): at 19:20

## 2024-05-25 RX ADMIN — MORPHINE SULFATE 60 MILLIGRAM(S): 50 CAPSULE, EXTENDED RELEASE ORAL at 18:12

## 2024-05-25 RX ADMIN — Medication 40 MILLIGRAM(S): at 05:16

## 2024-05-25 RX ADMIN — Medication 600 MILLIGRAM(S): at 18:13

## 2024-05-25 RX ADMIN — Medication 2000 UNIT(S): at 11:35

## 2024-05-25 RX ADMIN — ENOXAPARIN SODIUM 110 MILLIGRAM(S): 100 INJECTION SUBCUTANEOUS at 03:04

## 2024-05-25 RX ADMIN — BUDESONIDE AND FORMOTEROL FUMARATE DIHYDRATE 2 PUFF(S): 160; 4.5 AEROSOL RESPIRATORY (INHALATION) at 20:12

## 2024-05-25 RX ADMIN — Medication 3 MILLILITER(S): at 14:50

## 2024-05-25 RX ADMIN — Medication 3 MILLILITER(S): at 10:36

## 2024-05-25 RX ADMIN — TIOTROPIUM BROMIDE 2 PUFF(S): 18 CAPSULE ORAL; RESPIRATORY (INHALATION) at 10:39

## 2024-05-25 RX ADMIN — Medication 100 MILLIGRAM(S): at 05:17

## 2024-05-25 RX ADMIN — Medication 0.6 MILLIGRAM(S): at 05:17

## 2024-05-25 RX ADMIN — Medication 1 TABLET(S): at 18:13

## 2024-05-25 RX ADMIN — PANTOPRAZOLE SODIUM 40 MILLIGRAM(S): 20 TABLET, DELAYED RELEASE ORAL at 05:17

## 2024-05-25 RX ADMIN — MORPHINE SULFATE 60 MILLIGRAM(S): 50 CAPSULE, EXTENDED RELEASE ORAL at 05:20

## 2024-05-25 RX ADMIN — Medication 0.6 MILLIGRAM(S): at 18:13

## 2024-05-25 RX ADMIN — Medication 1 MILLIGRAM(S): at 11:35

## 2024-05-25 NOTE — PROGRESS NOTE ADULT - ASSESSMENT
54yo female with pmh  of HTN, HLD, COPD on home O2 5L, SLE, antiphospholipid syndrome,  rheumatoid arthritis, cervical cancer sp hysterectomy,  laryngeal cancer s/p CT, RT, HFpEF (06/21/19 EF 63%) s/p AICD+PPM, DVT/PE (on lovenox QD),  NSTEMI in 2015, subclavian stenosis s/p stent placement presenting for pneumonia.    #LLL Bacterial Pneumonia with Chronic hypoxia  #H/o COPD   -CT chest - New partial opacification of the left lower lobe central bronchi and lower lobe consolidation likely representing pneumonia/atelectasis. Recommend follow-up CT chest in 6/8 weeks to assess for resolution.  -c/w Unasyn and doxycycline per ID  (allergic to vantin and moxifloxacin)   -blood cultures NTD  -sputum culture pending  -MRSA nares negative  -procal 0.06  -pulm consulted appreciated  -c/w mucinex bid  -c/w prednisone 40mg qd x 5 days,   -c/w albuterol, symbicort inhalers     # Pericardial effusion  # Possible viral ( Coxsackie ) pericarditis   -CT with new mild-to-moderate pericardial effusion  -TTE:   LV Ejection Fraction 64 %.   Small pericardial effusion. No echocardiographic evidence of   tamponade.  Compared to study dated 6/15/23, there is a pericardial effusion on current study.  -trop negative  -ESR 71, CRP 87.5  -coxsackie serology >> Positive    -Cardio consult Dr. Magana - unlikely pericarditis (chest pain is pleuritic, ECG normal, symptoms responded to abx and steroids) - trial colchicine 0.6mg bid x 1 month.   -c/w prednisone 40mg x 5 days      #SLE  #rheumatoid arthritis   #antiphospholipid syndrome   #DVT/PE  -YOON was positive but ds DNA was negative   -last note by [facility] onc plan was for warfarin with target INR 3-4 and aspirin 81mg po daily that was switch to lovenox due to cerebral bleeding   -f/u labs: YOON, dsDNA, C3, C4, lupus profile   -CT chest with Right lower lobe segmental pulmonary embolism with calcifications with similar appearance to chest CTA dated 6/24/2022 likely reflecting chronic PE.  -C/w lovenox home dose 110mg QD (home dose 1.5mg/kg daily)   -Rheumatology consult: pending     #HFpEF s/p AICD+PPM   -TTE 06/21/19 EF 63%  - TTE this admission similar EF, small Pericardial effusion   - C/w metoprolol 100mg QD    #NSTEMI in 2015, subclavian stenosis s/p stent placement   -No longer on ASA 2/2 GI bleed    #Cervical cancer /sp hysterectomy  #Laryngeal cancer s/p chemo, in remission  -outpatient followup     #Chronic pain   -c/w morphine:  morphine ER 60mg 4 times a day standing    Misc:  - DVT Prophylaxis: Lovenox   - GI Prophylaxis: PPI   - Diet: Diet, DASH/TLC    Reviewed labs and imaging independently.     Pending : PNA/ pericarditis/ Clinical improvement   Dispo: Home   Plan d/w the patient at bedside.     Will anticipate for tomorrow.

## 2024-05-25 NOTE — PROGRESS NOTE ADULT - SUBJECTIVE AND OBJECTIVE BOX
ILEANA MCNEIL  53y  Female      Patient is a 53y old  Female who presents with a chief complaint of CAP.    INTERVAL HPI/OVERNIGHT EVENTS:      ******************************* REVIEW OF SYSTEMS:**********************************************  Feeling better.  All other review of systems negative    *********************** VITALS ******************************************    T(F): 98.8 (05-25-24 @ 04:53)  HR: 87 (05-25-24 @ 04:53) (83 - 87)  BP: 95/61 (05-25-24 @ 04:53) (95/61 - 111/72)  RR: 19 (05-25-24 @ 04:53) (19 - 19)  SpO2: 96% (05-25-24 @ 04:53) (94% - 96%)            ******************************** PHYSICAL EXAM:**************************************************  GENERAL: NAD    PSYCH: no agitation, baseline mentation  HEENT:     NERVOUS SYSTEM:  Alert & Oriented X3,     PULMONARY: CHRISTOPHER, CTA    CARDIOVASCULAR: S1S2 RRR    GI: Soft, NT, ND; BS present.    EXTREMITIES:  2+ Peripheral Pulses, No clubbing, cyanosis, or edema    LYMPH: No lymphadenopathy noted    SKIN: No rashes or lesions      **************************** LABS *******************************************************                          12.0   11.68 )-----------( 323      ( 25 May 2024 06:25 )             36.7     05-25    137  |  98  |  17  ----------------------------<  130<H>  4.1   |  23  |  1.0    Ca    9.0      25 May 2024 06:25  Mg     1.9     05-25        Urinalysis Basic - ( 25 May 2024 06:25 )    Color: x / Appearance: x / SG: x / pH: x  Gluc: 130 mg/dL / Ketone: x  / Bili: x / Urobili: x   Blood: x / Protein: x / Nitrite: x   Leuk Esterase: x / RBC: x / WBC x   Sq Epi: x / Non Sq Epi: x / Bacteria: x        Lactate Trend        CAPILLARY BLOOD GLUCOSE              **************************Active Medications *******************************************  Plaquenil (Other)  Avelox (Other)  Arava (Anaphylaxis; Angioedema)  adhesives (Rash; Urticaria)  Vantin (Other (Mild))      acetaminophen     Tablet .. 650 milliGRAM(s) Oral every 6 hours PRN  albuterol    90 MICROgram(s) HFA Inhaler 2 Puff(s) Inhalation every 6 hours PRN  albuterol/ipratropium for Nebulization 3 milliLiter(s) Nebulizer every 6 hours  ALPRAZolam 1 milliGRAM(s) Oral every 6 hours PRN  aluminum hydroxide/magnesium hydroxide/simethicone Suspension 30 milliLiter(s) Oral every 4 hours PRN  amoxicillin  875 milliGRAM(s)/clavulanate 1 Tablet(s) Oral two times a day  budesonide  80 MICROgram(s)/formoterol 4.5 MICROgram(s) Inhaler 2 Puff(s) Inhalation two times a day  cholecalciferol 2000 Unit(s) Oral daily  colchicine 0.6 milliGRAM(s) Oral every 12 hours  diphenhydrAMINE Injectable 50 milliGRAM(s) IV Push once PRN  doxycycline monohydrate Capsule 100 milliGRAM(s) Oral every 12 hours  enoxaparin Injectable 110 milliGRAM(s) SubCutaneous every 24 hours  folic acid 1 milliGRAM(s) Oral daily  guaiFENesin  milliGRAM(s) Oral every 12 hours  melatonin 3 milliGRAM(s) Oral at bedtime PRN  metoprolol succinate  milliGRAM(s) Oral daily  morphine ER Tablet 60 milliGRAM(s) Oral four times a day  naloxegol 25 milliGRAM(s) Oral daily  ondansetron Injectable 4 milliGRAM(s) IV Push every 8 hours PRN  pantoprazole    Tablet 40 milliGRAM(s) Oral before breakfast  senna 2 Tablet(s) Oral at bedtime  tiotropium 2.5 MICROgram(s) Inhaler 2 Puff(s) Inhalation daily      ***************************************************  RADIOLOGY & ADDITIONAL TESTS:    Imaging Personally Reviewed:  [ ] YES  [ ] NO    HEALTH ISSUES - PROBLEM Dx:

## 2024-05-26 ENCOUNTER — TRANSCRIPTION ENCOUNTER (OUTPATIENT)
Age: 54
End: 2024-05-26

## 2024-05-26 VITALS
DIASTOLIC BLOOD PRESSURE: 63 MMHG | HEART RATE: 82 BPM | RESPIRATION RATE: 18 BRPM | SYSTOLIC BLOOD PRESSURE: 102 MMHG | TEMPERATURE: 98 F

## 2024-05-26 LAB
ANION GAP SERPL CALC-SCNC: 8 MMOL/L — SIGNIFICANT CHANGE UP (ref 7–14)
BASOPHILS # BLD AUTO: 0.03 K/UL — SIGNIFICANT CHANGE UP (ref 0–0.2)
BASOPHILS NFR BLD AUTO: 0.3 % — SIGNIFICANT CHANGE UP (ref 0–1)
BUN SERPL-MCNC: 20 MG/DL — SIGNIFICANT CHANGE UP (ref 10–20)
CALCIUM SERPL-MCNC: 8.7 MG/DL — SIGNIFICANT CHANGE UP (ref 8.4–10.5)
CHLORIDE SERPL-SCNC: 103 MMOL/L — SIGNIFICANT CHANGE UP (ref 98–110)
CO2 SERPL-SCNC: 27 MMOL/L — SIGNIFICANT CHANGE UP (ref 17–32)
CREAT SERPL-MCNC: 1 MG/DL — SIGNIFICANT CHANGE UP (ref 0.7–1.5)
EGFR: 67 ML/MIN/1.73M2 — SIGNIFICANT CHANGE UP
EOSINOPHIL # BLD AUTO: 0.15 K/UL — SIGNIFICANT CHANGE UP (ref 0–0.7)
EOSINOPHIL NFR BLD AUTO: 1.4 % — SIGNIFICANT CHANGE UP (ref 0–8)
GLUCOSE SERPL-MCNC: 107 MG/DL — HIGH (ref 70–99)
HCT VFR BLD CALC: 29.9 % — LOW (ref 37–47)
HGB BLD-MCNC: 9.6 G/DL — LOW (ref 12–16)
IMM GRANULOCYTES NFR BLD AUTO: 1.1 % — HIGH (ref 0.1–0.3)
LYMPHOCYTES # BLD AUTO: 3.8 K/UL — HIGH (ref 1.2–3.4)
LYMPHOCYTES # BLD AUTO: 36 % — SIGNIFICANT CHANGE UP (ref 20.5–51.1)
MAGNESIUM SERPL-MCNC: 1.8 MG/DL — SIGNIFICANT CHANGE UP (ref 1.8–2.4)
MCHC RBC-ENTMCNC: 29 PG — SIGNIFICANT CHANGE UP (ref 27–31)
MCHC RBC-ENTMCNC: 32.1 G/DL — SIGNIFICANT CHANGE UP (ref 32–37)
MCV RBC AUTO: 90.3 FL — SIGNIFICANT CHANGE UP (ref 81–99)
MONOCYTES # BLD AUTO: 0.84 K/UL — HIGH (ref 0.1–0.6)
MONOCYTES NFR BLD AUTO: 7.9 % — SIGNIFICANT CHANGE UP (ref 1.7–9.3)
NEUTROPHILS # BLD AUTO: 5.63 K/UL — SIGNIFICANT CHANGE UP (ref 1.4–6.5)
NEUTROPHILS NFR BLD AUTO: 53.3 % — SIGNIFICANT CHANGE UP (ref 42.2–75.2)
NRBC # BLD: 0 /100 WBCS — SIGNIFICANT CHANGE UP (ref 0–0)
PLATELET # BLD AUTO: 249 K/UL — SIGNIFICANT CHANGE UP (ref 130–400)
PMV BLD: 11.1 FL — HIGH (ref 7.4–10.4)
POTASSIUM SERPL-MCNC: 4.4 MMOL/L — SIGNIFICANT CHANGE UP (ref 3.5–5)
POTASSIUM SERPL-SCNC: 4.4 MMOL/L — SIGNIFICANT CHANGE UP (ref 3.5–5)
RBC # BLD: 3.31 M/UL — LOW (ref 4.2–5.4)
RBC # FLD: 13.8 % — SIGNIFICANT CHANGE UP (ref 11.5–14.5)
SODIUM SERPL-SCNC: 138 MMOL/L — SIGNIFICANT CHANGE UP (ref 135–146)
WBC # BLD: 10.57 K/UL — SIGNIFICANT CHANGE UP (ref 4.8–10.8)
WBC # FLD AUTO: 10.57 K/UL — SIGNIFICANT CHANGE UP (ref 4.8–10.8)

## 2024-05-26 PROCEDURE — 99232 SBSQ HOSP IP/OBS MODERATE 35: CPT

## 2024-05-26 RX ORDER — NALOXONE HYDROCHLORIDE 4 MG/.1ML
1 SPRAY NASAL
Qty: 1 | Refills: 0
Start: 2024-05-26

## 2024-05-26 RX ORDER — COLCHICINE 0.6 MG
1 TABLET ORAL
Qty: 46 | Refills: 0
Start: 2024-05-26 | End: 2024-06-17

## 2024-05-26 RX ADMIN — Medication 0.6 MILLIGRAM(S): at 17:37

## 2024-05-26 RX ADMIN — Medication 1 TABLET(S): at 17:36

## 2024-05-26 RX ADMIN — MORPHINE SULFATE 60 MILLIGRAM(S): 50 CAPSULE, EXTENDED RELEASE ORAL at 12:46

## 2024-05-26 RX ADMIN — Medication 600 MILLIGRAM(S): at 06:12

## 2024-05-26 RX ADMIN — Medication 0.6 MILLIGRAM(S): at 06:12

## 2024-05-26 RX ADMIN — MORPHINE SULFATE 60 MILLIGRAM(S): 50 CAPSULE, EXTENDED RELEASE ORAL at 00:10

## 2024-05-26 RX ADMIN — Medication 600 MILLIGRAM(S): at 17:37

## 2024-05-26 RX ADMIN — MORPHINE SULFATE 60 MILLIGRAM(S): 50 CAPSULE, EXTENDED RELEASE ORAL at 06:12

## 2024-05-26 RX ADMIN — Medication 1 MILLIGRAM(S): at 08:02

## 2024-05-26 RX ADMIN — Medication 1 TABLET(S): at 06:11

## 2024-05-26 RX ADMIN — Medication 100 MILLIGRAM(S): at 08:07

## 2024-05-26 RX ADMIN — Medication 100 MILLIGRAM(S): at 17:36

## 2024-05-26 RX ADMIN — MORPHINE SULFATE 60 MILLIGRAM(S): 50 CAPSULE, EXTENDED RELEASE ORAL at 17:36

## 2024-05-26 RX ADMIN — ENOXAPARIN SODIUM 110 MILLIGRAM(S): 100 INJECTION SUBCUTANEOUS at 03:31

## 2024-05-26 RX ADMIN — TIOTROPIUM BROMIDE 2 PUFF(S): 18 CAPSULE ORAL; RESPIRATORY (INHALATION) at 08:03

## 2024-05-26 RX ADMIN — Medication 100 MILLIGRAM(S): at 06:12

## 2024-05-26 RX ADMIN — PANTOPRAZOLE SODIUM 40 MILLIGRAM(S): 20 TABLET, DELAYED RELEASE ORAL at 06:11

## 2024-05-26 RX ADMIN — Medication 3 MILLILITER(S): at 02:31

## 2024-05-26 RX ADMIN — Medication 3 MILLILITER(S): at 08:46

## 2024-05-26 RX ADMIN — BUDESONIDE AND FORMOTEROL FUMARATE DIHYDRATE 2 PUFF(S): 160; 4.5 AEROSOL RESPIRATORY (INHALATION) at 08:03

## 2024-05-26 RX ADMIN — Medication 1 MILLIGRAM(S): at 12:47

## 2024-05-26 RX ADMIN — Medication 2000 UNIT(S): at 12:47

## 2024-05-26 RX ADMIN — MORPHINE SULFATE 60 MILLIGRAM(S): 50 CAPSULE, EXTENDED RELEASE ORAL at 00:40

## 2024-05-26 NOTE — DISCHARGE NOTE PROVIDER - NSDCFUADDINST_GEN_ALL_CORE_FT
Please follow up up with your PCP, cardiologist, pulmonologist, and rheumatologist in 1-2 weeks.   Take your antibiotics as prescribed - last day for both is 6/2/24.   Take the colchicine as prescribed - last day is 6/18/24.

## 2024-05-26 NOTE — DISCHARGE NOTE PROVIDER - CARE PROVIDER_API CALL
Musa Magana  Cardiology  [address] [place] [address], [address] [address]  Phone: [phone]  Fax: [phone]  Follow Up Time:

## 2024-05-26 NOTE — PROGRESS NOTE ADULT - ASSESSMENT
54yo female with pmh  of HTN, HLD, COPD on home O2 5L, SLE, antiphospholipid syndrome,  rheumatoid arthritis, cervical cancer sp hysterectomy,  laryngeal cancer s/p CT, RT, HFpEF (06/21/19 EF 63%) s/p AICD+PPM, DVT/PE (on lovenox QD),  NSTEMI in 2015, subclavian stenosis s/p stent placement presenting for pneumonia.    #LLL Bacterial Pneumonia with Chronic hypoxia  #H/o COPD   -CT chest - New partial opacification of the left lower lobe central bronchi and lower lobe consolidation likely representing pneumonia/atelectasis. Recommend follow-up CT chest in 6/8 weeks to assess for resolution.  -c/w Unasyn and doxycycline per ID  (allergic to vantin and moxifloxacin)   -blood cultures NTD  -sputum culture pending  -MRSA nares negative  -procal 0.06  -pulm consulted appreciated  -c/w mucinex bid  -prednisone 40mg qd x 5 days,   -c/w albuterol, symbicort inhalers     # Pericardial effusion  # Possible viral ( Coxsackie ) pericarditis   -CT with new mild-to-moderate pericardial effusion  -TTE:   LV Ejection Fraction 64 %.   Small pericardial effusion. No echocardiographic evidence of   tamponade.  Compared to study dated 6/15/23, there is a pericardial effusion on current study.  -trop negative  -ESR 71, CRP 87.5  -coxsackie serology >> Positive    -Cardio consult Dr. Magana - (chest pain is pleuritic, ECG normal, symptoms responded to abx and steroids)   -  colchicine 0.6mg bid x 1 month.   - prednisone 40mg x 5 days      #SLE  #rheumatoid arthritis   #antiphospholipid syndrome   #DVT/PE  -YOON was positive but ds DNA was negative   -last note by heme onc plan was for warfarin with target INR 3-4 and aspirin 81mg po daily that was switch to lovenox due to cerebral bleeding   -f/u labs: YOON, dsDNA, C3, C4, lupus profile   -CT chest with Right lower lobe segmental pulmonary embolism with calcifications with similar appearance to chest CTA dated 6/24/2022 likely reflecting chronic PE.  -C/w lovenox home dose 110mg QD (home dose 1.5mg/kg daily)   -Rheumatology f/u as outpt.     #HFpEF s/p AICD+PPM   -TTE 06/21/19 EF 63%  - TTE this admission similar EF, small Pericardial effusion   - C/w metoprolol 100mg QD    #NSTEMI in 2015, subclavian stenosis s/p stent placement   -No longer on ASA 2/2 GI bleed    #Cervical cancer /sp hysterectomy  #Laryngeal cancer s/p chemo, in remission  -outpatient followup     #Chronic pain   -c/w morphine:  morphine ER 60mg 4 times a day standing    Misc:  - DVT Prophylaxis: Lovenox   - GI Prophylaxis: PPI   - Diet: Diet, DASH/TLC    Reviewed labs and imaging independently.     DC planning today.   Dispo: Home   Plan d/w the patient at bedside.

## 2024-05-26 NOTE — DISCHARGE NOTE PROVIDER - NSDCMRMEDTOKEN_GEN_ALL_CORE_FT
albuterol 90 mcg/inh inhalation aerosol: 2 puff(s) inhaled every 6 hours, As Needed  cholecalciferol 125 mcg (5000 intl units) oral tablet: 1 tab(s) orally once a day  cyanocobalamin 1000 mcg/mL injectable solution: 1 milliliter(s) injectable once a week  folic acid 1 mg oral tablet: 1 tab(s) orally once a day  Lovenox 100 mg/mL injectable solution: 110 milligram(s) subcutaneously once a day (at bedtime)  Metoprolol Succinate  mg oral tablet, extended release: 1 tab(s) orally once a day  morphine 15 mg oral capsule: 1 tab(s) orally 2 times a day as needed for  severe pain  MS Contin 30 mg oral tablet, extended release: 1 tab(s) orally 7 times a day  Trelegy Ellipta 100 mcg-62.5 mcg-25 mcg/inh inhalation powder: 1 puff(s) inhaled once a day  Xanax 1 mg oral tablet: 1 tab(s) orally 4 times a day   albuterol 90 mcg/inh inhalation aerosol: 2 puff(s) inhaled every 6 hours, As Needed  amoxicillin-clavulanate 875 mg-125 mg oral tablet: 1 tab(s) orally 2 times a day  cholecalciferol 125 mcg (5000 intl units) oral tablet: 1 tab(s) orally once a day  colchicine 0.6 mg oral capsule: 1 cap(s) orally 2 times a day  cyanocobalamin 1000 mcg/mL injectable solution: 1 milliliter(s) injectable once a week  doxycycline hyclate 100 mg oral tablet: 1 tab(s) orally 2 times a day  folic acid 1 mg oral tablet: 1 tab(s) orally once a day  Lovenox 100 mg/mL injectable solution: 110 milligram(s) subcutaneously once a day (at bedtime)  Metoprolol Succinate  mg oral tablet, extended release: 1 tab(s) orally once a day  morphine 15 mg oral capsule: 1 tab(s) orally 2 times a day as needed for  severe pain  MS Contin 30 mg oral tablet, extended release: 1 tab(s) orally 7 times a day  naloxone 4 mg/0.1 mL nasal spray: 1 spray(s) intranasally 3 times a day as needed for  opioid overdose  Trelegy Ellipta 100 mcg-62.5 mcg-25 mcg/inh inhalation powder: 1 puff(s) inhaled once a day  Xanax 1 mg oral tablet: 1 tab(s) orally 4 times a day

## 2024-05-26 NOTE — DISCHARGE NOTE PROVIDER - NSDCFUSCHEDAPPT_GEN_ALL_CORE_FT
Andre Hurst  [facility]  PULMMED [address]  Scheduled Appointment: 06/07/2024    Unknown, Doctor  [facility]Admits  Scheduled Appointment: 06/25/2024    [facility]  [facility] [address]  Scheduled Appointment: 06/25/2024

## 2024-05-26 NOTE — PROGRESS NOTE ADULT - PROVIDER SPECIALTY LIST ADULT
Hospitalist
Hospitalist
Internal Medicine
Internal Medicine
Hospitalist
Hospitalist
Pulmonology
Pulmonology
Hospitalist
Internal Medicine
Pulmonology
Hospitalist
Infectious Disease

## 2024-05-26 NOTE — DISCHARGE NOTE PROVIDER - NSDCCPCAREPLAN_GEN_ALL_CORE_FT
PRINCIPAL DISCHARGE DIAGNOSIS  Diagnosis: Pneumonia  Assessment and Plan of Treatment:   Please take your medications as directed. Don’t skip doses. Follow up with your primary care physician within 3 days. Continue taking your antibiotics as directed until they are all gone—even if you start to feel better. This will prevent the pneumonia from reoccuring. Coughing up mucus is normal. Don’t use medicines to suppress your cough unless your cough is dry, painful, or interferes with your sleep. Get plenty of rest until your fever, shortness of breath, and chest pain go away. Plan to get a flu shot every year. Ask your primary care doctor about pneumonia vaccines.  Seek immediate medical attention if you experience chest pain, trouble breathing, blue lips or fingernails, fever of 100.4°F  (38°C) or higher, yellow, green, bloody, or smelly sputum, more than normal mucus production, vomiting or diarrhea.

## 2024-05-26 NOTE — DISCHARGE NOTE PROVIDER - HOSPITAL COURSE
Pt is a 54 y/o F PMHx  of HTN, HLD, COPD on home O2, SLE, lupus anticoagulant, antiphospholipid syndrome,  rheumatoid arthritis, cervical cancer sp hysterectomy,  laryngeal cancer s/p CT, RT, HFpEF (06/21/19 EF 63%) s/p AICD+PPM, DVT/PE (on lovenox QD),  NSTEMI in 2015, subclavian stenosis s/p stent placement presenting for fever and pleuritic chest pain. Pt reports on Wed she woke up with pleuritic chest pain and increased SOB, She reports she has required her home O2 use more frequently as well. Reports TMax 104 and went to urgent care on Friday where she was given Augmentin. When her symptoms did not improve she presented to the ED.    /81 HR 93 RR 19 T98.4F Sat 98% on 3L O2    CT CHEST PE New partial opacification of the left lower lobe central bronchi and lower lobe consolidation likely representing pneumonia/atelectasis. Recommend follow-up CT chest in 6/8 weeks to assess for resolution.    Mild to moderate pericardial effusion, new.    Admitted to medicine    Hospital course:  Treated for bacterial PNA with unasyn and doxycyline per ID - will be discharged with augmentin and doxycycline. Completed course of Prednisone 40mg x 5 days. Seen by pulmonary team.   Treated for pericardial effusion with possible pericarditis (coxsackie serology positive) - seen by cardiology Dr. Magana, will trial colchicine 0.6 BID x 1 month.   Seen by rheumatology team.   Patient is afebrile and hemodynamically stable. She is medically cleared for discharge.     54yo female with pmh  of HTN, HLD, COPD on home O2 5L, SLE, antiphospholipid syndrome,  rheumatoid arthritis, cervical cancer sp hysterectomy,  laryngeal cancer s/p CT, RT, HFpEF (06/21/19 EF 63%) s/p AICD+PPM, DVT/PE (on lovenox QD),  NSTEMI in 2015, subclavian stenosis s/p stent placement presenting for pneumonia.    #LLL Bacterial Pneumonia with Chronic hypoxia  #H/o COPD   -CT chest - New partial opacification of the left lower lobe central bronchi and lower lobe consolidation likely representing pneumonia/atelectasis. Recommend follow-up CT chest in 6/8 weeks to assess for resolution.  -c/w Unasyn and doxycycline per ID  (allergic to vantin and moxifloxacin)   -blood cultures NTD  -sputum culture pending  -MRSA nares negative  -procal 0.06  -pulm consulted appreciated  -c/w mucinex bid  -c/w prednisone 40mg qd x 5 days,   -c/w albuterol, symbicort inhalers     # Pericardial effusion  # Possible viral ( Coxsackie ) pericarditis   -CT with new mild-to-moderate pericardial effusion  -TTE:   LV Ejection Fraction 64 %.   Small pericardial effusion. No echocardiographic evidence of   tamponade.  Compared to study dated 6/15/23, there is a pericardial effusion on current study.  -trop negative  -ESR 71, CRP 87.5  -coxsackie serology >> Positive    -Cardio consult Dr. Magana - unlikely pericarditis (chest pain is pleuritic, ECG normal, symptoms responded to abx and steroids) - trial colchicine 0.6mg bid x 1 month.   -c/w prednisone 40mg x 5 days  - completed     #SLE  #rheumatoid arthritis   #antiphospholipid syndrome   #DVT/PE  -YOON was positive but ds DNA was negative   -last note by heme onc plan was for warfarin with target INR 3-4 and aspirin 81mg po daily that was switch to lovenox due to cerebral bleeding   -f/u labs: YOON, dsDNA, C3, C4, lupus profile   -CT chest with Right lower lobe segmental pulmonary embolism with calcifications with similar appearance to chest CTA dated 6/24/2022 likely reflecting chronic PE.  -C/w lovenox home dose 110mg QD (home dose 1.5mg/kg daily)   -Rheumatology consult appreciated    #HFpEF s/p AICD+PPM   -TTE 06/21/19 EF 63%  - TTE this admission similar EF, small Pericardial effusion   - C/w metoprolol 100mg QD    #NSTEMI in 2015, subclavian stenosis s/p stent placement   -No longer on ASA 2/2 GI bleed    #Cervical cancer /sp hysterectomy  #Laryngeal cancer s/p chemo, in remission  -outpatient followup     #Chronic pain   -c/w morphine:  morphine ER 60mg 4 times a day standing    Misc:  - DVT Prophylaxis: Lovenox   - GI Prophylaxis: PPI   - Diet: Diet, DASH/TLC         Pt is a 52 y/o F PMHx  of HTN, HLD, COPD on home O2, SLE, lupus anticoagulant, antiphospholipid syndrome,  rheumatoid arthritis, cervical cancer sp hysterectomy,  laryngeal cancer s/p CT, RT, HFpEF (06/21/19 EF 63%) s/p AICD+PPM, DVT/PE (on lovenox QD),  NSTEMI in 2015, subclavian stenosis s/p stent placement presenting for fever and pleuritic chest pain. Pt reports on Wed she woke up with pleuritic chest pain and increased SOB, She reports she has required her home O2 use more frequently as well. Reports TMax 104 and went to urgent care on Friday where she was given Augmentin. When her symptoms did not improve she presented to the ED.    /81 HR 93 RR 19 T98.4F Sat 98% on 3L O2    CT CHEST PE New partial opacification of the left lower lobe central bronchi and lower lobe consolidation likely representing pneumonia/atelectasis. Recommend follow-up CT chest in 6/8 weeks to assess for resolution.    Mild to moderate pericardial effusion, new.    Admitted to medicine    Hospital course:  Treated for bacterial PNA with unasyn and doxycyline per ID - will be discharged with augmentin and doxycycline x 7 more days. Completed course of Prednisone 40mg x 5 days. Seen by pulmonary team.   Treated for pericardial effusion with possible pericarditis (coxsackie serology positive) - seen by cardiology Dr. Magana, will trial colchicine 0.6 BID x 1 month - last day 6/18/24. Will follow up with cardiology as an outpatient.   Seen by rheumatology team.   Patient is afebrile and hemodynamically stable. She is medically cleared for discharge.     54yo female with pmh  of HTN, HLD, COPD on home O2 5L, SLE, antiphospholipid syndrome,  rheumatoid arthritis, cervical cancer sp hysterectomy,  laryngeal cancer s/p CT, RT, HFpEF (06/21/19 EF 63%) s/p AICD+PPM, DVT/PE (on lovenox QD),  NSTEMI in 2015, subclavian stenosis s/p stent placement presenting for pneumonia.    #LLL Bacterial Pneumonia with Chronic hypoxia  #H/o COPD   -CT chest - New partial opacification of the left lower lobe central bronchi and lower lobe consolidation likely representing pneumonia/atelectasis. Recommend follow-up CT chest in 6/8 weeks to assess for resolution.  -c/w Unasyn and doxycycline per ID  (allergic to vantin and moxifloxacin)  - discharge with 7 more days of augmentin and doxycycline  -blood cultures NTD  -sputum culture pending  -MRSA nares negative  -procal 0.06  -pulm consulted appreciated  -c/w mucinex bid  -c/w prednisone 40mg qd x 5 days - completed  -c/w albuterol, symbicort inhalers     # Pericardial effusion  # Possible viral ( Coxsackie ) pericarditis   -CT with new mild-to-moderate pericardial effusion  -TTE:   LV Ejection Fraction 64 %.   Small pericardial effusion. No echocardiographic evidence of   tamponade.  Compared to study dated 6/15/23, there is a pericardial effusion on current study.  -trop negative  -ESR 71, CRP 87.5  -coxsackie serology >> Positive    -Cardio consult Dr. Magana - unlikely pericarditis (chest pain is pleuritic, ECG normal, symptoms responded to abx and steroids) - trial colchicine 0.6mg bid x 1 month.   -c/w prednisone 40mg x 5 days  - completed     #SLE  #rheumatoid arthritis   #antiphospholipid syndrome   #DVT/PE  -YOON was positive but ds DNA was negative   -last note by heme onc plan was for warfarin with target INR 3-4 and aspirin 81mg po daily that was switch to lovenox due to cerebral bleeding   -f/u labs: YOON, dsDNA, C3, C4, lupus profile   -CT chest with Right lower lobe segmental pulmonary embolism with calcifications with similar appearance to chest CTA dated 6/24/2022 likely reflecting chronic PE.  -C/w lovenox home dose 110mg QD (home dose 1.5mg/kg daily)   -Rheumatology consult appreciated    #HFpEF s/p AICD+PPM   -TTE 06/21/19 EF 63%  - TTE this admission similar EF, small Pericardial effusion   - C/w metoprolol 100mg QD    #NSTEMI in 2015, subclavian stenosis s/p stent placement   -No longer on ASA 2/2 GI bleed    #Cervical cancer /sp hysterectomy  #Laryngeal cancer s/p chemo, in remission  -outpatient followup     #Chronic pain   -c/w morphine:  morphine ER 60mg 4 times a day standing    Misc:  - DVT Prophylaxis: Lovenox   - GI Prophylaxis: PPI   - Diet: Diet, DASH/TLC

## 2024-05-26 NOTE — PROGRESS NOTE ADULT - SUBJECTIVE AND OBJECTIVE BOX
ILEANA MCNEIL  53y  Female      Patient is a 53y old  Female who presents with a chief complaint of CAP.       INTERVAL HPI/OVERNIGHT EVENTS:      ******************************* REVIEW OF SYSTEMS:**********************************************    All other review of systems negative    *********************** VITALS ******************************************    T(F): 98.4 (05-26-24 @ 04:23)  HR: 90 (05-26-24 @ 08:08) (78 - 91)  BP: 122/74 (05-26-24 @ 08:08) (96/60 - 137/79)  RR: 18 (05-26-24 @ 04:23) (18 - 18)  SpO2: 95% (05-26-24 @ 08:08) (95% - 99%)            ******************************** PHYSICAL EXAM:**************************************************  GENERAL: NAD    PSYCH: no agitation, baseline mentation  HEENT:     NERVOUS SYSTEM:  Alert & Oriented X3,     PULMONARY: CHRISTOPHER, CTA    CARDIOVASCULAR: S1S2 RRR    GI: Soft, NT, ND; BS present.    EXTREMITIES:  2+ Peripheral Pulses, No clubbing, cyanosis, or edema    LYMPH: No lymphadenopathy noted    SKIN: No rashes or lesions      **************************** LABS *******************************************************                          9.6    10.57 )-----------( 249      ( 26 May 2024 06:51 )             29.9     05-26    138  |  103  |  20  ----------------------------<  107<H>  4.4   |  27  |  1.0    Ca    8.7      26 May 2024 06:51  Mg     1.8     05-26        Urinalysis Basic - ( 26 May 2024 06:51 )    Color: x / Appearance: x / SG: x / pH: x  Gluc: 107 mg/dL / Ketone: x  / Bili: x / Urobili: x   Blood: x / Protein: x / Nitrite: x   Leuk Esterase: x / RBC: x / WBC x   Sq Epi: x / Non Sq Epi: x / Bacteria: x        Lactate Trend        CAPILLARY BLOOD GLUCOSE              **************************Active Medications *******************************************  Plaquenil (Other)  Avelox (Other)  Arava (Anaphylaxis; Angioedema)  adhesives (Rash; Urticaria)  Vantin (Other (Mild))      acetaminophen     Tablet .. 650 milliGRAM(s) Oral every 6 hours PRN  albuterol    90 MICROgram(s) HFA Inhaler 2 Puff(s) Inhalation every 6 hours PRN  albuterol/ipratropium for Nebulization 3 milliLiter(s) Nebulizer every 6 hours  ALPRAZolam 1 milliGRAM(s) Oral every 6 hours PRN  aluminum hydroxide/magnesium hydroxide/simethicone Suspension 30 milliLiter(s) Oral every 4 hours PRN  amoxicillin  875 milliGRAM(s)/clavulanate 1 Tablet(s) Oral two times a day  budesonide  80 MICROgram(s)/formoterol 4.5 MICROgram(s) Inhaler 2 Puff(s) Inhalation two times a day  cholecalciferol 2000 Unit(s) Oral daily  colchicine 0.6 milliGRAM(s) Oral every 12 hours  diphenhydrAMINE Injectable 50 milliGRAM(s) IV Push once PRN  doxycycline monohydrate Capsule 100 milliGRAM(s) Oral every 12 hours  enoxaparin Injectable 110 milliGRAM(s) SubCutaneous every 24 hours  folic acid 1 milliGRAM(s) Oral daily  guaiFENesin  milliGRAM(s) Oral every 12 hours  melatonin 3 milliGRAM(s) Oral at bedtime PRN  metoprolol succinate  milliGRAM(s) Oral daily  morphine ER Tablet 60 milliGRAM(s) Oral four times a day  naloxegol 25 milliGRAM(s) Oral daily  ondansetron Injectable 4 milliGRAM(s) IV Push every 8 hours PRN  pantoprazole    Tablet 40 milliGRAM(s) Oral before breakfast  senna 2 Tablet(s) Oral at bedtime  tiotropium 2.5 MICROgram(s) Inhaler 2 Puff(s) Inhalation daily      ***************************************************  RADIOLOGY & ADDITIONAL TESTS:    Imaging Personally Reviewed:  [ ] YES  [ ] NO    HEALTH ISSUES - PROBLEM Dx:

## 2024-05-27 LAB
CCP IGG SERPL-ACNC: <8 UNITS — SIGNIFICANT CHANGE UP
RF+CCP IGG SER-IMP: NEGATIVE — SIGNIFICANT CHANGE UP

## 2024-05-28 ENCOUNTER — INPATIENT (INPATIENT)
Facility: HOSPITAL | Age: 54
LOS: 9 days | Discharge: HOME | DRG: 314 | End: 2024-06-07
Attending: HOSPITALIST | Admitting: INTERNAL MEDICINE
Payer: MEDICARE

## 2024-05-28 VITALS
HEART RATE: 110 BPM | RESPIRATION RATE: 20 BRPM | WEIGHT: 175.05 LBS | TEMPERATURE: 99 F | SYSTOLIC BLOOD PRESSURE: 115 MMHG | DIASTOLIC BLOOD PRESSURE: 77 MMHG | OXYGEN SATURATION: 96 %

## 2024-05-28 DIAGNOSIS — I77.1 STRICTURE OF ARTERY: Chronic | ICD-10-CM

## 2024-05-28 DIAGNOSIS — Z90.710 ACQUIRED ABSENCE OF BOTH CERVIX AND UTERUS: Chronic | ICD-10-CM

## 2024-05-28 DIAGNOSIS — Z90.49 ACQUIRED ABSENCE OF OTHER SPECIFIED PARTS OF DIGESTIVE TRACT: Chronic | ICD-10-CM

## 2024-05-28 DIAGNOSIS — Z98.890 OTHER SPECIFIED POSTPROCEDURAL STATES: Chronic | ICD-10-CM

## 2024-05-28 DIAGNOSIS — R06.00 DYSPNEA, UNSPECIFIED: ICD-10-CM

## 2024-05-28 DIAGNOSIS — Z95.810 PRESENCE OF AUTOMATIC (IMPLANTABLE) CARDIAC DEFIBRILLATOR: Chronic | ICD-10-CM

## 2024-05-28 LAB
ALBUMIN SERPL ELPH-MCNC: 4.1 G/DL — SIGNIFICANT CHANGE UP (ref 3.5–5.2)
ALP SERPL-CCNC: 131 U/L — HIGH (ref 30–115)
ALT FLD-CCNC: 41 U/L — SIGNIFICANT CHANGE UP (ref 0–41)
ANION GAP SERPL CALC-SCNC: 14 MMOL/L — SIGNIFICANT CHANGE UP (ref 7–14)
APPEARANCE UR: CLEAR — SIGNIFICANT CHANGE UP
APTT BLD: 54.1 SEC — HIGH (ref 27–39.2)
AST SERPL-CCNC: 25 U/L — SIGNIFICANT CHANGE UP (ref 0–41)
AUTO DIFF PNL BLD: ABNORMAL
B19V IGG SER-ACNC: 7.96 INDEX — HIGH (ref 0–0.9)
B19V IGG+IGM SER-IMP: POSITIVE
B19V IGG+IGM SER-IMP: SIGNIFICANT CHANGE UP
B19V IGM FLD-ACNC: 0.36 INDEX — SIGNIFICANT CHANGE UP (ref 0–0.9)
B19V IGM SER-ACNC: NEGATIVE — SIGNIFICANT CHANGE UP
BASOPHILS # BLD AUTO: 0.06 K/UL — SIGNIFICANT CHANGE UP (ref 0–0.2)
BASOPHILS NFR BLD AUTO: 0.4 % — SIGNIFICANT CHANGE UP (ref 0–1)
BILIRUB SERPL-MCNC: 0.4 MG/DL — SIGNIFICANT CHANGE UP (ref 0.2–1.2)
BILIRUB UR-MCNC: NEGATIVE — SIGNIFICANT CHANGE UP
BUN SERPL-MCNC: 16 MG/DL — SIGNIFICANT CHANGE UP (ref 10–20)
C-ANCA SER-ACNC: NEGATIVE — SIGNIFICANT CHANGE UP
CALCIUM SERPL-MCNC: 9.7 MG/DL — SIGNIFICANT CHANGE UP (ref 8.4–10.5)
CHLORIDE SERPL-SCNC: 98 MMOL/L — SIGNIFICANT CHANGE UP (ref 98–110)
CO2 SERPL-SCNC: 27 MMOL/L — SIGNIFICANT CHANGE UP (ref 17–32)
COLOR SPEC: YELLOW — SIGNIFICANT CHANGE UP
CREAT SERPL-MCNC: 1.2 MG/DL — SIGNIFICANT CHANGE UP (ref 0.7–1.5)
DIFF PNL FLD: ABNORMAL
EGFR: 54 ML/MIN/1.73M2 — LOW
EOSINOPHIL # BLD AUTO: 0.08 K/UL — SIGNIFICANT CHANGE UP (ref 0–0.7)
EOSINOPHIL NFR BLD AUTO: 0.5 % — SIGNIFICANT CHANGE UP (ref 0–8)
GAS PNL BLDV: SIGNIFICANT CHANGE UP
GLUCOSE BLDC GLUCOMTR-MCNC: 145 MG/DL — HIGH (ref 70–99)
GLUCOSE SERPL-MCNC: 120 MG/DL — HIGH (ref 70–99)
GLUCOSE UR QL: NEGATIVE MG/DL — SIGNIFICANT CHANGE UP
HCT VFR BLD CALC: 37.3 % — SIGNIFICANT CHANGE UP (ref 37–47)
HGB BLD-MCNC: 11.9 G/DL — LOW (ref 12–16)
IMM GRANULOCYTES NFR BLD AUTO: 1.4 % — HIGH (ref 0.1–0.3)
INR BLD: 1.16 RATIO — SIGNIFICANT CHANGE UP (ref 0.65–1.3)
KETONES UR-MCNC: NEGATIVE MG/DL — SIGNIFICANT CHANGE UP
LACTATE SERPL-SCNC: 0.8 MMOL/L — SIGNIFICANT CHANGE UP (ref 0.7–2)
LEUKOCYTE ESTERASE UR-ACNC: NEGATIVE — SIGNIFICANT CHANGE UP
LYMPHOCYTES # BLD AUTO: 23.9 % — SIGNIFICANT CHANGE UP (ref 20.5–51.1)
LYMPHOCYTES # BLD AUTO: 3.75 K/UL — HIGH (ref 1.2–3.4)
MCHC RBC-ENTMCNC: 29.2 PG — SIGNIFICANT CHANGE UP (ref 27–31)
MCHC RBC-ENTMCNC: 31.9 G/DL — LOW (ref 32–37)
MCV RBC AUTO: 91.4 FL — SIGNIFICANT CHANGE UP (ref 81–99)
MONOCYTES # BLD AUTO: 1.54 K/UL — HIGH (ref 0.1–0.6)
MONOCYTES NFR BLD AUTO: 9.8 % — HIGH (ref 1.7–9.3)
NEUTROPHILS # BLD AUTO: 10.07 K/UL — HIGH (ref 1.4–6.5)
NEUTROPHILS NFR BLD AUTO: 64 % — SIGNIFICANT CHANGE UP (ref 42.2–75.2)
NITRITE UR-MCNC: NEGATIVE — SIGNIFICANT CHANGE UP
NRBC # BLD: 0 /100 WBCS — SIGNIFICANT CHANGE UP (ref 0–0)
NT-PROBNP SERPL-SCNC: 208 PG/ML — SIGNIFICANT CHANGE UP (ref 0–300)
P-ANCA SER-ACNC: NEGATIVE — SIGNIFICANT CHANGE UP
PH UR: 5.5 — SIGNIFICANT CHANGE UP (ref 5–8)
PLATELET # BLD AUTO: 342 K/UL — SIGNIFICANT CHANGE UP (ref 130–400)
PMV BLD: 10.7 FL — HIGH (ref 7.4–10.4)
POTASSIUM SERPL-MCNC: 4.3 MMOL/L — SIGNIFICANT CHANGE UP (ref 3.5–5)
POTASSIUM SERPL-SCNC: 4.3 MMOL/L — SIGNIFICANT CHANGE UP (ref 3.5–5)
PROT SERPL-MCNC: 6.9 G/DL — SIGNIFICANT CHANGE UP (ref 6–8)
PROT UR-MCNC: SIGNIFICANT CHANGE UP MG/DL
PROTHROM AB SERPL-ACNC: 13.2 SEC — HIGH (ref 9.95–12.87)
RBC # BLD: 4.08 M/UL — LOW (ref 4.2–5.4)
RBC # FLD: 13.8 % — SIGNIFICANT CHANGE UP (ref 11.5–14.5)
SODIUM SERPL-SCNC: 139 MMOL/L — SIGNIFICANT CHANGE UP (ref 135–146)
SP GR SPEC: >1.03 — HIGH (ref 1–1.03)
TROPONIN T, HIGH SENSITIVITY RESULT: 10 NG/L — SIGNIFICANT CHANGE UP (ref 6–13)
UROBILINOGEN FLD QL: 0.2 MG/DL — SIGNIFICANT CHANGE UP (ref 0.2–1)
WBC # BLD: 15.72 K/UL — HIGH (ref 4.8–10.8)
WBC # FLD AUTO: 15.72 K/UL — HIGH (ref 4.8–10.8)

## 2024-05-28 PROCEDURE — 94010 BREATHING CAPACITY TEST: CPT

## 2024-05-28 PROCEDURE — 88112 CYTOPATH CELL ENHANCE TECH: CPT

## 2024-05-28 PROCEDURE — 82962 GLUCOSE BLOOD TEST: CPT

## 2024-05-28 PROCEDURE — 93306 TTE W/DOPPLER COMPLETE: CPT

## 2024-05-28 PROCEDURE — 83735 ASSAY OF MAGNESIUM: CPT

## 2024-05-28 PROCEDURE — 83036 HEMOGLOBIN GLYCOSYLATED A1C: CPT

## 2024-05-28 PROCEDURE — 87206 SMEAR FLUORESCENT/ACID STAI: CPT

## 2024-05-28 PROCEDURE — 87116 MYCOBACTERIA CULTURE: CPT

## 2024-05-28 PROCEDURE — 83615 LACTATE (LD) (LDH) ENZYME: CPT

## 2024-05-28 PROCEDURE — 87641 MR-STAPH DNA AMP PROBE: CPT

## 2024-05-28 PROCEDURE — 81003 URINALYSIS AUTO W/O SCOPE: CPT

## 2024-05-28 PROCEDURE — 86480 TB TEST CELL IMMUN MEASURE: CPT

## 2024-05-28 PROCEDURE — 87015 SPECIMEN INFECT AGNT CONCNTJ: CPT

## 2024-05-28 PROCEDURE — 83605 ASSAY OF LACTIC ACID: CPT

## 2024-05-28 PROCEDURE — 85652 RBC SED RATE AUTOMATED: CPT

## 2024-05-28 PROCEDURE — 84157 ASSAY OF PROTEIN OTHER: CPT

## 2024-05-28 PROCEDURE — 93308 TTE F-UP OR LMTD: CPT

## 2024-05-28 PROCEDURE — 93307 TTE W/O DOPPLER COMPLETE: CPT

## 2024-05-28 PROCEDURE — C1729: CPT

## 2024-05-28 PROCEDURE — 87449 NOS EACH ORGANISM AG IA: CPT

## 2024-05-28 PROCEDURE — 86900 BLOOD TYPING SEROLOGIC ABO: CPT

## 2024-05-28 PROCEDURE — 85025 COMPLETE CBC W/AUTO DIFF WBC: CPT

## 2024-05-28 PROCEDURE — 85730 THROMBOPLASTIN TIME PARTIAL: CPT

## 2024-05-28 PROCEDURE — 33017 PRCRD DRG 6YR+ W/O CGEN CAR: CPT

## 2024-05-28 PROCEDURE — 84484 ASSAY OF TROPONIN QUANT: CPT

## 2024-05-28 PROCEDURE — 71045 X-RAY EXAM CHEST 1 VIEW: CPT | Mod: 26

## 2024-05-28 PROCEDURE — 86658 ENTEROVIRUS ANTIBODY: CPT

## 2024-05-28 PROCEDURE — 99291 CRITICAL CARE FIRST HOUR: CPT

## 2024-05-28 PROCEDURE — 85027 COMPLETE CBC AUTOMATED: CPT

## 2024-05-28 PROCEDURE — 89051 BODY FLUID CELL COUNT: CPT

## 2024-05-28 PROCEDURE — 87102 FUNGUS ISOLATION CULTURE: CPT

## 2024-05-28 PROCEDURE — 87070 CULTURE OTHR SPECIMN AEROBIC: CPT

## 2024-05-28 PROCEDURE — 82945 GLUCOSE OTHER FLUID: CPT

## 2024-05-28 PROCEDURE — 87252 VIRUS INOCULATION TISSUE: CPT

## 2024-05-28 PROCEDURE — 80048 BASIC METABOLIC PNL TOTAL CA: CPT

## 2024-05-28 PROCEDURE — 87385 HISTOPLASMA CAPSUL AG IA: CPT

## 2024-05-28 PROCEDURE — 71045 X-RAY EXAM CHEST 1 VIEW: CPT

## 2024-05-28 PROCEDURE — 86644 CMV ANTIBODY: CPT

## 2024-05-28 PROCEDURE — 86850 RBC ANTIBODY SCREEN: CPT

## 2024-05-28 PROCEDURE — 80202 ASSAY OF VANCOMYCIN: CPT

## 2024-05-28 PROCEDURE — 82042 OTHER SOURCE ALBUMIN QUAN EA: CPT

## 2024-05-28 PROCEDURE — 86777 TOXOPLASMA ANTIBODY: CPT

## 2024-05-28 PROCEDURE — 87040 BLOOD CULTURE FOR BACTERIA: CPT

## 2024-05-28 PROCEDURE — C1894: CPT

## 2024-05-28 PROCEDURE — 86698 HISTOPLASMA ANTIBODY: CPT

## 2024-05-28 PROCEDURE — 0225U NFCT DS DNA&RNA 21 SARSCOV2: CPT

## 2024-05-28 PROCEDURE — C1769: CPT

## 2024-05-28 PROCEDURE — 86645 CMV ANTIBODY IGM: CPT

## 2024-05-28 PROCEDURE — 86778 TOXOPLASMA ANTIBODY IGM: CPT

## 2024-05-28 PROCEDURE — 87799 DETECT AGENT NOS DNA QUANT: CPT

## 2024-05-28 PROCEDURE — 87640 STAPH A DNA AMP PROBE: CPT

## 2024-05-28 PROCEDURE — 85610 PROTHROMBIN TIME: CPT

## 2024-05-28 PROCEDURE — 84100 ASSAY OF PHOSPHORUS: CPT

## 2024-05-28 PROCEDURE — 83986 ASSAY PH BODY FLUID NOS: CPT

## 2024-05-28 PROCEDURE — 93005 ELECTROCARDIOGRAM TRACING: CPT

## 2024-05-28 PROCEDURE — 94640 AIRWAY INHALATION TREATMENT: CPT

## 2024-05-28 PROCEDURE — 86140 C-REACTIVE PROTEIN: CPT

## 2024-05-28 PROCEDURE — 80053 COMPREHEN METABOLIC PANEL: CPT

## 2024-05-28 PROCEDURE — 71250 CT THORAX DX C-: CPT | Mod: MC

## 2024-05-28 PROCEDURE — 99285 EMERGENCY DEPT VISIT HI MDM: CPT | Mod: FS

## 2024-05-28 PROCEDURE — 71275 CT ANGIOGRAPHY CHEST: CPT | Mod: 26,MC

## 2024-05-28 PROCEDURE — 86901 BLOOD TYPING SEROLOGIC RH(D): CPT

## 2024-05-28 PROCEDURE — 87075 CULTR BACTERIA EXCEPT BLOOD: CPT

## 2024-05-28 PROCEDURE — 36415 COLL VENOUS BLD VENIPUNCTURE: CPT

## 2024-05-28 PROCEDURE — 93010 ELECTROCARDIOGRAM REPORT: CPT

## 2024-05-28 PROCEDURE — 87205 SMEAR GRAM STAIN: CPT

## 2024-05-28 PROCEDURE — 88305 TISSUE EXAM BY PATHOLOGIST: CPT

## 2024-05-28 PROCEDURE — 87324 CLOSTRIDIUM AG IA: CPT

## 2024-05-28 PROCEDURE — 84145 PROCALCITONIN (PCT): CPT

## 2024-05-28 RX ORDER — MORPHINE SULFATE 50 MG/1
15 CAPSULE, EXTENDED RELEASE ORAL
Refills: 0 | Status: DISCONTINUED | OUTPATIENT
Start: 2024-05-28 | End: 2024-05-28

## 2024-05-28 RX ORDER — CHOLECALCIFEROL (VITAMIN D3) 125 MCG
1 CAPSULE ORAL
Refills: 0 | DISCHARGE

## 2024-05-28 RX ORDER — CHOLECALCIFEROL (VITAMIN D3) 125 MCG
2000 CAPSULE ORAL DAILY
Refills: 0 | Status: DISCONTINUED | OUTPATIENT
Start: 2024-05-28 | End: 2024-05-29

## 2024-05-28 RX ORDER — MORPHINE SULFATE 50 MG/1
6 CAPSULE, EXTENDED RELEASE ORAL ONCE
Refills: 0 | Status: DISCONTINUED | OUTPATIENT
Start: 2024-05-28 | End: 2024-05-28

## 2024-05-28 RX ORDER — ALBUTEROL 90 UG/1
2 AEROSOL, METERED ORAL EVERY 6 HOURS
Refills: 0 | Status: DISCONTINUED | OUTPATIENT
Start: 2024-05-28 | End: 2024-05-29

## 2024-05-28 RX ORDER — ONDANSETRON 8 MG/1
4 TABLET, FILM COATED ORAL EVERY 8 HOURS
Refills: 0 | Status: DISCONTINUED | OUTPATIENT
Start: 2024-05-28 | End: 2024-05-29

## 2024-05-28 RX ORDER — ACETAMINOPHEN 500 MG
975 TABLET ORAL ONCE
Refills: 0 | Status: COMPLETED | OUTPATIENT
Start: 2024-05-28 | End: 2024-05-28

## 2024-05-28 RX ORDER — MORPHINE SULFATE 50 MG/1
30 CAPSULE, EXTENDED RELEASE ORAL
Refills: 0 | Status: DISCONTINUED | OUTPATIENT
Start: 2024-05-28 | End: 2024-05-29

## 2024-05-28 RX ORDER — IPRATROPIUM/ALBUTEROL SULFATE 18-103MCG
3 AEROSOL WITH ADAPTER (GRAM) INHALATION ONCE
Refills: 0 | Status: COMPLETED | OUTPATIENT
Start: 2024-05-28 | End: 2024-05-28

## 2024-05-28 RX ORDER — HEPARIN SODIUM 5000 [USP'U]/ML
INJECTION INTRAVENOUS; SUBCUTANEOUS
Qty: 25000 | Refills: 0 | Status: DISCONTINUED | OUTPATIENT
Start: 2024-05-28 | End: 2024-05-28

## 2024-05-28 RX ORDER — HEPARIN SODIUM 5000 [USP'U]/ML
4000 INJECTION INTRAVENOUS; SUBCUTANEOUS EVERY 6 HOURS
Refills: 0 | Status: DISCONTINUED | OUTPATIENT
Start: 2024-05-28 | End: 2024-05-29

## 2024-05-28 RX ORDER — TIOTROPIUM BROMIDE 18 UG/1
2 CAPSULE ORAL; RESPIRATORY (INHALATION) DAILY
Refills: 0 | Status: DISCONTINUED | OUTPATIENT
Start: 2024-05-28 | End: 2024-05-29

## 2024-05-28 RX ORDER — HEPARIN SODIUM 5000 [USP'U]/ML
1000 INJECTION INTRAVENOUS; SUBCUTANEOUS
Qty: 25000 | Refills: 0 | Status: DISCONTINUED | OUTPATIENT
Start: 2024-05-28 | End: 2024-05-28

## 2024-05-28 RX ORDER — METOPROLOL TARTRATE 50 MG
100 TABLET ORAL DAILY
Refills: 0 | Status: DISCONTINUED | OUTPATIENT
Start: 2024-05-28 | End: 2024-05-29

## 2024-05-28 RX ORDER — NYSTATIN 500MM UNIT
500000 POWDER (EA) MISCELLANEOUS ONCE
Refills: 0 | Status: COMPLETED | OUTPATIENT
Start: 2024-05-28 | End: 2024-05-28

## 2024-05-28 RX ORDER — HEPARIN SODIUM 5000 [USP'U]/ML
1200 INJECTION INTRAVENOUS; SUBCUTANEOUS
Qty: 25000 | Refills: 0 | Status: DISCONTINUED | OUTPATIENT
Start: 2024-05-28 | End: 2024-05-29

## 2024-05-28 RX ORDER — BUDESONIDE AND FORMOTEROL FUMARATE DIHYDRATE 160; 4.5 UG/1; UG/1
2 AEROSOL RESPIRATORY (INHALATION)
Refills: 0 | Status: DISCONTINUED | OUTPATIENT
Start: 2024-05-28 | End: 2024-05-29

## 2024-05-28 RX ORDER — AMPICILLIN SODIUM AND SULBACTAM SODIUM 250; 125 MG/ML; MG/ML
3 INJECTION, POWDER, FOR SUSPENSION INTRAMUSCULAR; INTRAVENOUS EVERY 6 HOURS
Refills: 0 | Status: DISCONTINUED | OUTPATIENT
Start: 2024-05-28 | End: 2024-05-29

## 2024-05-28 RX ORDER — AMPICILLIN SODIUM AND SULBACTAM SODIUM 250; 125 MG/ML; MG/ML
3 INJECTION, POWDER, FOR SUSPENSION INTRAMUSCULAR; INTRAVENOUS ONCE
Refills: 0 | Status: COMPLETED | OUTPATIENT
Start: 2024-05-28 | End: 2024-05-28

## 2024-05-28 RX ORDER — PREGABALIN 225 MG/1
1 CAPSULE ORAL
Qty: 0 | Refills: 0 | DISCHARGE

## 2024-05-28 RX ORDER — COLCHICINE 0.6 MG
0.6 TABLET ORAL
Refills: 0 | Status: DISCONTINUED | OUTPATIENT
Start: 2024-05-28 | End: 2024-05-29

## 2024-05-28 RX ORDER — HEPARIN SODIUM 5000 [USP'U]/ML
6500 INJECTION INTRAVENOUS; SUBCUTANEOUS ONCE
Refills: 0 | Status: COMPLETED | OUTPATIENT
Start: 2024-05-28 | End: 2024-05-28

## 2024-05-28 RX ORDER — MORPHINE SULFATE 50 MG/1
1 CAPSULE, EXTENDED RELEASE ORAL
Refills: 0 | DISCHARGE

## 2024-05-28 RX ORDER — LANOLIN ALCOHOL/MO/W.PET/CERES
3 CREAM (GRAM) TOPICAL AT BEDTIME
Refills: 0 | Status: DISCONTINUED | OUTPATIENT
Start: 2024-05-28 | End: 2024-05-29

## 2024-05-28 RX ORDER — SODIUM CHLORIDE 9 MG/ML
1000 INJECTION, SOLUTION INTRAVENOUS
Refills: 0 | Status: DISCONTINUED | OUTPATIENT
Start: 2024-05-28 | End: 2024-05-29

## 2024-05-28 RX ORDER — HEPARIN SODIUM 5000 [USP'U]/ML
8500 INJECTION INTRAVENOUS; SUBCUTANEOUS EVERY 6 HOURS
Refills: 0 | Status: DISCONTINUED | OUTPATIENT
Start: 2024-05-28 | End: 2024-05-29

## 2024-05-28 RX ORDER — FOLIC ACID 0.8 MG
1 TABLET ORAL DAILY
Refills: 0 | Status: DISCONTINUED | OUTPATIENT
Start: 2024-05-28 | End: 2024-05-29

## 2024-05-28 RX ORDER — HEPARIN SODIUM 5000 [USP'U]/ML
6500 INJECTION INTRAVENOUS; SUBCUTANEOUS EVERY 6 HOURS
Refills: 0 | Status: DISCONTINUED | OUTPATIENT
Start: 2024-05-28 | End: 2024-05-28

## 2024-05-28 RX ORDER — ALPRAZOLAM 0.25 MG
1 TABLET ORAL
Refills: 0 | Status: DISCONTINUED | OUTPATIENT
Start: 2024-05-28 | End: 2024-05-29

## 2024-05-28 RX ORDER — HEPARIN SODIUM 5000 [USP'U]/ML
3000 INJECTION INTRAVENOUS; SUBCUTANEOUS EVERY 6 HOURS
Refills: 0 | Status: DISCONTINUED | OUTPATIENT
Start: 2024-05-28 | End: 2024-05-28

## 2024-05-28 RX ORDER — MORPHINE SULFATE 50 MG/1
30 CAPSULE, EXTENDED RELEASE ORAL
Refills: 0 | Status: DISCONTINUED | OUTPATIENT
Start: 2024-05-28 | End: 2024-05-28

## 2024-05-28 RX ORDER — MORPHINE SULFATE 50 MG/1
15 CAPSULE, EXTENDED RELEASE ORAL
Refills: 0 | Status: DISCONTINUED | OUTPATIENT
Start: 2024-05-28 | End: 2024-05-29

## 2024-05-28 RX ORDER — ACETAMINOPHEN 500 MG
650 TABLET ORAL EVERY 6 HOURS
Refills: 0 | Status: DISCONTINUED | OUTPATIENT
Start: 2024-05-28 | End: 2024-05-29

## 2024-05-28 RX ORDER — ENOXAPARIN SODIUM 100 MG/ML
110 INJECTION SUBCUTANEOUS
Refills: 0 | DISCHARGE

## 2024-05-28 RX ORDER — PANTOPRAZOLE SODIUM 20 MG/1
40 TABLET, DELAYED RELEASE ORAL
Refills: 0 | Status: DISCONTINUED | OUTPATIENT
Start: 2024-05-28 | End: 2024-05-29

## 2024-05-28 RX ORDER — MORPHINE SULFATE 50 MG/1
60 CAPSULE, EXTENDED RELEASE ORAL
Refills: 0 | Status: DISCONTINUED | OUTPATIENT
Start: 2024-05-28 | End: 2024-05-29

## 2024-05-28 RX ORDER — FLUTICASONE FUROATE, UMECLIDINIUM BROMIDE AND VILANTEROL TRIFENATATE 200; 62.5; 25 UG/1; UG/1; UG/1
1 POWDER RESPIRATORY (INHALATION)
Qty: 0 | Refills: 0 | DISCHARGE

## 2024-05-28 RX ADMIN — AMPICILLIN SODIUM AND SULBACTAM SODIUM 200 GRAM(S): 250; 125 INJECTION, POWDER, FOR SUSPENSION INTRAMUSCULAR; INTRAVENOUS at 23:57

## 2024-05-28 RX ADMIN — HEPARIN SODIUM 1200 UNIT(S)/HR: 5000 INJECTION INTRAVENOUS; SUBCUTANEOUS at 22:48

## 2024-05-28 RX ADMIN — SODIUM CHLORIDE 75 MILLILITER(S): 9 INJECTION, SOLUTION INTRAVENOUS at 22:38

## 2024-05-28 RX ADMIN — MORPHINE SULFATE 30 MILLIGRAM(S): 50 CAPSULE, EXTENDED RELEASE ORAL at 23:58

## 2024-05-28 RX ADMIN — Medication 1 MILLIGRAM(S): at 22:39

## 2024-05-28 RX ADMIN — Medication 500000 UNIT(S): at 16:55

## 2024-05-28 RX ADMIN — Medication 2000 UNIT(S): at 22:39

## 2024-05-28 RX ADMIN — Medication 3 MILLILITER(S): at 16:58

## 2024-05-28 RX ADMIN — HEPARIN SODIUM 1000 UNIT(S)/HR: 5000 INJECTION INTRAVENOUS; SUBCUTANEOUS at 19:56

## 2024-05-28 RX ADMIN — MORPHINE SULFATE 30 MILLIGRAM(S): 50 CAPSULE, EXTENDED RELEASE ORAL at 22:39

## 2024-05-28 RX ADMIN — HEPARIN SODIUM 6500 UNIT(S): 5000 INJECTION INTRAVENOUS; SUBCUTANEOUS at 19:56

## 2024-05-28 RX ADMIN — Medication 100 MILLIGRAM(S): at 19:23

## 2024-05-28 RX ADMIN — AMPICILLIN SODIUM AND SULBACTAM SODIUM 200 GRAM(S): 250; 125 INJECTION, POWDER, FOR SUSPENSION INTRAMUSCULAR; INTRAVENOUS at 19:23

## 2024-05-28 RX ADMIN — MORPHINE SULFATE 6 MILLIGRAM(S): 50 CAPSULE, EXTENDED RELEASE ORAL at 16:54

## 2024-05-28 RX ADMIN — MORPHINE SULFATE 6 MILLIGRAM(S): 50 CAPSULE, EXTENDED RELEASE ORAL at 19:55

## 2024-05-28 RX ADMIN — Medication 975 MILLIGRAM(S): at 19:23

## 2024-05-28 RX ADMIN — TIOTROPIUM BROMIDE 2 PUFF(S): 18 CAPSULE ORAL; RESPIRATORY (INHALATION) at 22:40

## 2024-05-28 RX ADMIN — Medication 0.6 MILLIGRAM(S): at 22:39

## 2024-05-28 NOTE — H&P ADULT - HISTORY OF PRESENT ILLNESS
Pt is a 52 y/o F PMHx  of HTN, HLD, COPD on home O2, SLE, lupus anticoagulant, antiphospholipid syndrome,  rheumatoid arthritis, cervical cancer sp hysterectomy,  laryngeal cancer s/p CT, RT, HFpEF (06/21/19 EF 63%) s/p AICD+PPM, DVT/PE (on lovenox QD),  NSTEMI in 2015, subclavian stenosis s/p stent placement presenting for shortness of breath and pleuritic chest pain. Pt reports on Wed she woke up with pleuritic chest pain and increased SOB. Pt with recent admission 1 week ago for Pneumonia, was DC from hospital on Sunday with oral Abx regimen. States she did not feel well at home, continued to have SOB, intermittent fevers, and increasing need for O2 so she came back to the hospital.    Vitals: /77  RR 20 T99F --> 100.9F SAt 96% on 2L NC    CT Angio Chest PE Protocol w/ IV Cont significant for increased size large pericardial effusion    Admitted to CCU       Pt is a 54 y/o F PMHx  of HTN, HLD, COPD on home O2, SLE, lupus anticoagulant, antiphospholipid syndrome,  rheumatoid arthritis, cervical cancer sp hysterectomy,  laryngeal cancer s/p CT, RT, HFpEF (06/21/19 EF 63%) s/p AICD+PPM, DVT/PE (on lovenox QD),  NSTEMI in 2015, subclavian stenosis s/p stent placement presenting for shortness of breath and pleuritic chest pain. Pt reports on Wed she woke up with pleuritic chest pain and increased SOB. Pt with recent admission 1 week ago for Pneumonia, was DC from hospital on Sunday with oral Abx regimen. States she did not feel well at home, continued to have SOB, intermittent fevers, and increasing need for O2 so she came back to the hospital.    Vitals: /77  RR 20 T99F --> 100.9F Sat 96% on 2L NC    CT Angio Chest PE Protocol w/ IV Cont significant for increased size large pericardial effusion    Labs: WBC 15.7 proBNP 208 Trop 10 Lactate 0.8    Admitted to CCU

## 2024-05-28 NOTE — ED PROVIDER NOTE - PROGRESS NOTE DETAILS
ASUNCION Mckeon - Large pericardial effusion noted on CT, bedside echo concerning for tamponade. Called cardiology who will admit to CCU. Patient is vitally stable at this time.

## 2024-05-28 NOTE — PATIENT PROFILE ADULT - FALL HARM RISK - RISK INTERVENTIONS
Assistance OOB with selected safe patient handling equipment/Communicate Fall Risk and Risk Factors to all staff, patient, and family/Reinforce activity limits and safety measures with patient and family/Review medications for side effects contributing to fall risk/Sit up slowly, dangle for a short time, stand at bedside before walking/Toileting schedule using arm’s reach rule for commode and bathroom/Visual Cue: Yellow wristband/Bed in lowest position, wheels locked, appropriate side rails in place/Call bell, personal items and telephone in reach/Instruct patient to call for assistance before getting out of bed or chair/Non-slip footwear when patient is out of bed/[place] to call system/Physically safe environment - no spills, clutter or unnecessary equipment/Purposeful Proactive Rounding/Room/bathroom lighting operational, light cord in reach

## 2024-05-28 NOTE — ED PROVIDER NOTE - CLINICAL SUMMARY MEDICAL DECISION MAKING FREE TEXT BOX
52 y/o F PMHx  of HTN, HLD, COPD on home O2, SLE, lupus anticoagulant, antiphospholipid syndrome,  rheumatoid arthritis, cervical cancer sp hysterectomy,  laryngeal cancer s/p CT, RT, HFpEF (06/21/19 EF 63%) s/p AICD+PPM, DVT/PE (on lovenox QD),  NSTEMI in 2015, subclavian stenosis s/p stent placement presenting for shortness of breath and pleuritic chest pain. Pt reports on Wed she woke up with pleuritic chest pain and increased SOB. Pt with recent admission 1 week ago for Pneumonia, was DC from hospital on Sunday with oral Abx regimen. States she did not feel well at home, continued to have SOB, intermittent fevers, and increasing need for O2 so she came back to the hospital.    Vitals: /77  RR 20 T99F --> 100.9F Sat 96% on 2L NC    CT Angio Chest PE Protocol w/ IV Cont significant for increased size large pericardial effusion    Labs: WBC 15.7 proBNP 208 Trop 10 Lactate 0.8    Admitted to CCU

## 2024-05-28 NOTE — CHART NOTE - NSCHARTNOTEFT_GEN_A_CORE
[facility]  Prescription Monitoring Program Registry    Reference #: [id number]    Practitioner Count: 4  Pharmacy Count: 1  Current Opioid Prescriptions: 2  Current Benzodiazepine Prescriptions: 1  Current Stimulant Prescriptions: 0      Patient Demographic Information (PDI)       PDI	First Name	Last Name	Birth Date	Gender	Street Address	[facility] Code  ANGELY Bauman	1970	Female	[address]	[facility]	[id number]    Prescription Information      PDI Filter:    PDI	Current Rx	Drug Type	Rx Written	Rx Dispensed	Drug	Quantity	Days Supply	Prescriber Name	Prescriber ANGI #	Payment Method	Dispenser  A	Y	B	05/18/2024	05/23/2024	alprazolam 1 mg tablet	120	30	Jo Bush MD	[id number]	Medicare	[id number]  A	Y	O	05/08/2024	05/20/2024	morphine sulf er 30 mg tablet	210	30	Cheryl Corey MD	[id number]	Medicare	[id number]  A	Y	O	05/08/2024	05/18/2024	morphine sulfate ir 15 mg tab	60	30	Cheryl Corey MD	[id number]	Medicare	[id number]  A	N	B	04/16/2024	04/22/2024	alprazolam 1 mg tablet	120	30	Jo Bush MD	[id number]	Medicare	[id number]  A	N	O	04/03/2024	04/18/2024	morphine sulfate ir 15 mg tab	60	30	Cheryl Corey MD	[id number]	Medicare	[id number]  A	N	O	04/03/2024	04/17/2024	morphine sulf er 30 mg tablet	210	30	Cheryl Corey MD	[id number]	Medicare	[facility][id number]  A	N	B	03/06/2024	03/22/2024	alprazolam 1 mg tablet	120	30	Jo Bush MD	[id number]	Medicare	[id number]  A	N	O	03/14/2024	03/19/2024	morphine sulfate ir 15 mg tab	60	30	Stephanie Bhatt	[id number]	Medicare	[id number]  A	N	O	03/14/2024	03/19/2024	morphine sulf er 30 mg tablet	208	26	Danii Burnette	[id number]	Medicare	[id number]  A	N	B	02/08/2024	02/20/2024	alprazolam 1 mg tablet	120	30	Jo Bush MD	[id number]	Medicare	[id number]  A	N	O	02/14/2024	02/18/2024	morphine sulfate ir 15 mg tab	60	30	Stephanie Bhatt	[id number]	Medicare	[facility][id number]  A	N	O	02/14/2024	02/18/2024	morphine sulf er 30 mg tablet	240	30	Stephanie Bhatt	[id number]	Medicare	[facility][id number]  A	N	O	01/26/2024	01/27/2024	morphine sulf er 60 mg tablet	100	20	Danii Burnette	[id number]	[facility]  A	N	B	01/04/2024	01/21/2024	alprazolam 1 mg tablet	90	30	Musa Magana MD	[id number]	Medicare	[facility][id number]  A	N	O	01/17/2024	01/19/2024	morphine sulf er 60 mg tablet	33	6	Danii Burnette	[id number]	Medicare	[id number]  A	N	O	01/17/2024	01/17/2024	morphine sulfate ir 15 mg tab	60	30	Danii Burnette	[id number]	Medicare	[facility][id number]  A	N	B	12/07/2023	01/05/2024	alprazolam 1 mg tablet	90	15	Lan Chaney MD	[id number]	Medicare	[facility] #[id number]      * - Details of Drug Type : O = Opioid, B = Benzodiazepine, S = Stimulant    * - Drugs marked with an asterisk are compound drugs. If the compound drug is made up of more than one controlled substance, then each controlled substance will be a separate row in the table.          † Click the ‘Report Suspicious Activity’ button to report information related to controlled substance suspicious activity to the [place] [employer].    †† Click the ‘Send Questions/Comments’ button to send questions about this report to the [place] [employer], or call [phone].    ††† Click the ‘Substance Use Disorder Treatment’ button to go to the Office of Addiction Services and Supports website, www.[place].gov or call 1-390.705.7860.    © 2017 [facility] Department of Health -[place] [employer]23/28/2024 21:18  .

## 2024-05-28 NOTE — ED ADULT TRIAGE NOTE - CHIEF COMPLAINT QUOTE
pt comes to ED after leaving on friday for IV Abx for pneumonia. does not feel like shes getting better with PO meds

## 2024-05-28 NOTE — H&P ADULT - NSHPPOACENTRALVENOUSCATHETER_GEN_ALL_CORE
no [Dyspnea on exertion] : dyspnea during exertion [Weight Gain (___ Lbs)] : no recent weight gain [SOB] : no shortness of breath [Chest Discomfort] : no chest discomfort [Lower Ext Edema] : no extremity edema [Leg Claudication] : no intermittent leg claudication [Palpitations] : no palpitations [Orthopnea] : no orthopnea [PND] : no PND [Syncope] : no syncope [Joint Pain] : no joint pain [Joint Stiffness] : no joint stiffness [Negative] : Musculoskeletal

## 2024-05-28 NOTE — ED PROVIDER NOTE - PHYSICAL EXAMINATION
Physical Exam    Constitutional: No acute distress.   Eyes: Conjunctiva pink, Sclera clear, PERRLA, EOMI.  ENT: No sinus tenderness. No nasal discharge. No oropharyngeal erythema, edema, or exudates. Uvula midline.   Cardiovascular: Regular rate, regular rhythm.   Respiratory: unlabored respiratory effort, LLL crackles faint wheezing bilaterally  Gastrointestinal: Normal bowel sounds. soft, non distended, non-tender abdomen.   Musculoskeletal: supple neck, no midline tenderness. No joint or bony deformity.   Integumentary: warm, dry, no rash  Neurologic: awake, alert, cranial nerves II-XII grossly intact, extremities’ motor and sensory functions grossly intact

## 2024-05-28 NOTE — H&P ADULT - ASSESSMENT
Pt is a 52 y/o F PMHx  of HTN, HLD, COPD on home O2, SLE, lupus anticoagulant, antiphospholipid syndrome,  rheumatoid arthritis, cervical cancer sp hysterectomy,  laryngeal cancer s/p CT, RT, HFpEF (06/21/19 EF 63%) s/p AICD+PPM, DVT/PE (on lovenox QD),  NSTEMI in 2015, subclavian stenosis s/p stent placement presenting for shortness of breath and pleuritic chest pain. Pt with recent admission 1 week ago for pneumonia, imaging at the time showed trace pericardial effusion Repeat imagining on this admission shows large pericardial effusion. Admitted to CCU.    IMPRESSION:  #Large pericardial effusion - no evidence of tamponade based on bedside ECHO  #Community acquired pneumonia  #HFpEF  #SLE/APLS/RA  #Hx DVT/PE  #Hx subclavian stenosis  #Hx COPD on home O2  #NSTEMI in 2015      PLAN:    CNS: Avoid CNS Depressants. Pain control per home meds    HEENT: Oral care. Aspiration precautions     PULMONARY: HOB @ 45. Monitor Pulse Ox. Keep 88-92% supplement with NC    CARDIOVASCULAR: Large pericardial effusion. No evidence of tamponade on bedside ECHO. C/w colchicine. IVC collapsable, LR at 75cc/hr. Goal directed fluid resuscitation     GI: GI prophylaxis. NPO after MN    RENAL: Avoid nephrotoxic agents     INFECTIOUS DISEASE: C/w Doxy and Unasyn to complete tx of pneumonia (last day 6/1)    HEMATOLOGICAL: Hold home Lovenox, heparin ggt, PTT goal 60-70.    ENDOCRINE: Monitor FS. Insulin protocol if needed FS goal 140-180    MUSCULOSKELETAL: Ambulate as tolerated           # Misc  - DVT Prophylaxis: heparin  - GI Prophylaxis: pantoprazole  - Diet: DASH, NPO after MN  - Dispo: CCU   Pt is a 54 y/o F PMHx  of HTN, HLD, COPD on home O2, SLE, lupus anticoagulant, antiphospholipid syndrome,  rheumatoid arthritis, cervical cancer sp hysterectomy,  laryngeal cancer s/p CT, RT, HFpEF (06/21/19 EF 63%) s/p AICD+PPM, DVT/PE (on lovenox QD),  NSTEMI in 2015, subclavian stenosis s/p stent placement presenting for shortness of breath and pleuritic chest pain. Pt with recent admission 1 week ago for pneumonia, imaging at the time showed trace pericardial effusion Repeat imagining on this admission shows large pericardial effusion. Admitted to CCU.    IMPRESSION:  #Large pericardial effusion - no evidence of tamponade based on bedside ECHO  #Community acquired pneumonia  #HFpEF  #SLE/APLS/RA  #Hx DVT/PE  #Hx subclavian stenosis  #Hx COPD on home O2  #NSTEMI in 2015      PLAN:    CNS: Avoid CNS Depressants. Pain control per home meds    HEENT: Oral care. Aspiration precautions     PULMONARY: HOB @ 45. Monitor Pulse Ox. Keep 88-92% supplement with NC    CARDIOVASCULAR: Large pericardial effusion. No evidence of tamponade on bedside ECHO. C/w colchicine. IVC collapsable, LR at 75cc/hr. Goal directed fluid resuscitation     GI: GI prophylaxis. NPO after MN    RENAL: Avoid nephrotoxic agents     INFECTIOUS DISEASE: C/w Doxy and Unasyn to complete tx of pneumonia (last day 6/1). FU BCx. ESR, CRP. Trend fever curve    HEMATOLOGICAL: Hold home Lovenox, heparin ggt, PTT goal 60-70.    ENDOCRINE: Monitor FS. Insulin protocol if needed FS goal 140-180    MUSCULOSKELETAL: Ambulate as tolerated           # Misc  - DVT Prophylaxis: heparin  - GI Prophylaxis: pantoprazole  - Diet: DASH, NPO after MN  - Dispo: CCU

## 2024-05-28 NOTE — H&P ADULT - NSHPPHYSICALEXAM_GEN_ALL_CORE
LOS:     VITALS:   T(C): 38.3 (05-28-24 @ 20:30), Max: 38.3 (05-28-24 @ 20:30)  HR: 129 (05-28-24 @ 20:30) (110 - 129)  BP: 147/65 (05-28-24 @ 20:30) (115/77 - 147/65)  RR: 20 (05-28-24 @ 20:30) (20 - 20)  SpO2: 92% (05-28-24 @ 20:30) (92% - 96%)    GENERAL: NAD, lying in bed, speaks in full sentences  HEAD:  Atraumatic, Normocephalic  CHEST/LUNG: Crackles at bases b/l, mild expiratory wheeze, takes slow shallow breaths  HEART: Regular rate and rhythm  ABDOMEN: BSx4; Soft, nontender, nondistended  EXTREMITIES:  No LE edema b/l  NERVOUS SYSTEM:  A&Ox3, no focal deficits   SKIN: Warm and dry

## 2024-05-28 NOTE — ED PROVIDER NOTE - OBJECTIVE STATEMENT
of HTN, HLD, COPD on home O2, SLE, lupus anticoagulant, antiphospholipid syndrome,  rheumatoid arthritis, cervical cancer sp hysterectomy,  laryngeal cancer s/p CT, RT, HFpEF (06/21/19 EF 63%) s/p AICD+PPM, DVT/PE (on lovenox QD),  NSTEMI in 2015, subclavian stenosis s/p stent placement admitted recently for pericardial effusion, pericarditis, LLL PNA, Coxsackie presents to the ED with shortness of breath. Patient reports that she has been progressively short of breath with bilateral chest pain worse on inspiration. She developed fever tmax 103F yesterday and 101F this am. Denies abdominal pain, nausea, vomiting, diarrhea, constipation, dysuria, hematuria, lower extremity swelling, rash.

## 2024-05-28 NOTE — H&P ADULT - ATTENDING COMMENTS
Plan as outlined above.  Admit to CCU for large pericardial effusion with concern for tamponade.  NPO after MN for pericardiocentesis.  IVF hydration.

## 2024-05-28 NOTE — H&P ADULT - CRITICAL CARE ATTENDING COMMENT
This included management of respiratory function, adjustment of vasopressor support, optimization of nutrition and glycemic control, management of antibiotics, review and adjustment of fluid balances, management of pain and sedation, review of radiographs and imaging studies, and communication with consultants.    (Date of Service: 05/28/2024)

## 2024-05-29 ENCOUNTER — RESULT REVIEW (OUTPATIENT)
Age: 54
End: 2024-05-29

## 2024-05-29 LAB
ALBUMIN SERPL ELPH-MCNC: 3.8 G/DL — SIGNIFICANT CHANGE UP (ref 3.5–5.2)
ALP SERPL-CCNC: 172 U/L — HIGH (ref 30–115)
ALT FLD-CCNC: 45 U/L — HIGH (ref 0–41)
ANION GAP SERPL CALC-SCNC: 16 MMOL/L — HIGH (ref 7–14)
APTT BLD: 162.1 SEC — CRITICAL HIGH (ref 27–39.2)
APTT BLD: 31.9 SEC — SIGNIFICANT CHANGE UP (ref 27–39.2)
APTT BLD: 51.5 SEC — HIGH (ref 27–39.2)
AST SERPL-CCNC: 29 U/L — SIGNIFICANT CHANGE UP (ref 0–41)
B PERT IGG+IGM PNL SER: ABNORMAL
BASOPHILS # BLD AUTO: 0.06 K/UL — SIGNIFICANT CHANGE UP (ref 0–0.2)
BASOPHILS NFR BLD AUTO: 0.4 % — SIGNIFICANT CHANGE UP (ref 0–1)
BILIRUB SERPL-MCNC: 0.5 MG/DL — SIGNIFICANT CHANGE UP (ref 0.2–1.2)
BUN SERPL-MCNC: 17 MG/DL — SIGNIFICANT CHANGE UP (ref 10–20)
CALCIUM SERPL-MCNC: 9.2 MG/DL — SIGNIFICANT CHANGE UP (ref 8.4–10.4)
CHLORIDE SERPL-SCNC: 95 MMOL/L — LOW (ref 98–110)
CO2 SERPL-SCNC: 22 MMOL/L — SIGNIFICANT CHANGE UP (ref 17–32)
COLOR FLD: SIGNIFICANT CHANGE UP
CREAT SERPL-MCNC: 1.1 MG/DL — SIGNIFICANT CHANGE UP (ref 0.7–1.5)
CRP SERPL-MCNC: 157 MG/L — HIGH
EGFR: 60 ML/MIN/1.73M2 — SIGNIFICANT CHANGE UP
EOSINOPHIL # BLD AUTO: 0.06 K/UL — SIGNIFICANT CHANGE UP (ref 0–0.7)
EOSINOPHIL NFR BLD AUTO: 0.4 % — SIGNIFICANT CHANGE UP (ref 0–8)
ERYTHROCYTE [SEDIMENTATION RATE] IN BLOOD: 72 MM/HR — HIGH (ref 0–20)
FLUID INTAKE SUBSTANCE CLASS: SIGNIFICANT CHANGE UP
GLUCOSE SERPL-MCNC: 123 MG/DL — HIGH (ref 70–99)
HCT VFR BLD CALC: 32.2 % — LOW (ref 37–47)
HCT VFR BLD CALC: 36.1 % — LOW (ref 37–47)
HGB BLD-MCNC: 10.6 G/DL — LOW (ref 12–16)
HGB BLD-MCNC: 11.4 G/DL — LOW (ref 12–16)
IMM GRANULOCYTES NFR BLD AUTO: 1 % — HIGH (ref 0.1–0.3)
LYMPHOCYTES # BLD AUTO: 20.1 % — LOW (ref 20.5–51.1)
LYMPHOCYTES # BLD AUTO: 3.29 K/UL — SIGNIFICANT CHANGE UP (ref 1.2–3.4)
LYMPHOCYTES # FLD: 61 — SIGNIFICANT CHANGE UP
MAGNESIUM SERPL-MCNC: 1.8 MG/DL — SIGNIFICANT CHANGE UP (ref 1.8–2.4)
MCHC RBC-ENTMCNC: 29 PG — SIGNIFICANT CHANGE UP (ref 27–31)
MCHC RBC-ENTMCNC: 29.2 PG — SIGNIFICANT CHANGE UP (ref 27–31)
MCHC RBC-ENTMCNC: 31.6 G/DL — LOW (ref 32–37)
MCHC RBC-ENTMCNC: 32.9 G/DL — SIGNIFICANT CHANGE UP (ref 32–37)
MCV RBC AUTO: 88.7 FL — SIGNIFICANT CHANGE UP (ref 81–99)
MCV RBC AUTO: 91.9 FL — SIGNIFICANT CHANGE UP (ref 81–99)
MONOCYTES # BLD AUTO: 2.17 K/UL — HIGH (ref 0.1–0.6)
MONOCYTES NFR BLD AUTO: 13.3 % — HIGH (ref 1.7–9.3)
NEUTROPHILS # BLD AUTO: 10.62 K/UL — HIGH (ref 1.4–6.5)
NEUTROPHILS NFR BLD AUTO: 64.8 % — SIGNIFICANT CHANGE UP (ref 42.2–75.2)
NEUTROPHILS-BODY FLUID: 39 % — SIGNIFICANT CHANGE UP
NRBC # BLD: 0 /100 WBCS — SIGNIFICANT CHANGE UP (ref 0–0)
NRBC # BLD: 0 /100 WBCS — SIGNIFICANT CHANGE UP (ref 0–0)
PLATELET # BLD AUTO: 254 K/UL — SIGNIFICANT CHANGE UP (ref 130–400)
PLATELET # BLD AUTO: 295 K/UL — SIGNIFICANT CHANGE UP (ref 130–400)
PMV BLD: 10.9 FL — HIGH (ref 7.4–10.4)
PMV BLD: 11.8 FL — HIGH (ref 7.4–10.4)
POTASSIUM SERPL-MCNC: 4.8 MMOL/L — SIGNIFICANT CHANGE UP (ref 3.5–5)
POTASSIUM SERPL-SCNC: 4.8 MMOL/L — SIGNIFICANT CHANGE UP (ref 3.5–5)
PROT SERPL-MCNC: 6.5 G/DL — SIGNIFICANT CHANGE UP (ref 6–8)
RBC # BLD: 3.63 M/UL — LOW (ref 4.2–5.4)
RBC # BLD: 3.93 M/UL — LOW (ref 4.2–5.4)
RBC # FLD: 13.9 % — SIGNIFICANT CHANGE UP (ref 11.5–14.5)
RBC # FLD: 14 % — SIGNIFICANT CHANGE UP (ref 11.5–14.5)
RCV VOL RI: HIGH /UL (ref 0–0)
RNAP III AB SER-ACNC: 17 UNITS — SIGNIFICANT CHANGE UP (ref 0–19)
SODIUM SERPL-SCNC: 133 MMOL/L — LOW (ref 135–146)
TOTAL NUCLEATED CELL COUNT, BODY FLUID: 2654 /UL — SIGNIFICANT CHANGE UP
TROPONIN T, HIGH SENSITIVITY RESULT: 10 NG/L — SIGNIFICANT CHANGE UP (ref 6–13)
TUBE TYPE: SIGNIFICANT CHANGE UP
WBC # BLD: 16.37 K/UL — HIGH (ref 4.8–10.8)
WBC # BLD: 17.19 K/UL — HIGH (ref 4.8–10.8)
WBC # FLD AUTO: 16.37 K/UL — HIGH (ref 4.8–10.8)
WBC # FLD AUTO: 17.19 K/UL — HIGH (ref 4.8–10.8)

## 2024-05-29 PROCEDURE — 71045 X-RAY EXAM CHEST 1 VIEW: CPT | Mod: 26

## 2024-05-29 PROCEDURE — 71045 X-RAY EXAM CHEST 1 VIEW: CPT | Mod: 26,77

## 2024-05-29 PROCEDURE — 88112 CYTOPATH CELL ENHANCE TECH: CPT | Mod: 26

## 2024-05-29 PROCEDURE — 88305 TISSUE EXAM BY PATHOLOGIST: CPT | Mod: 26

## 2024-05-29 PROCEDURE — 93010 ELECTROCARDIOGRAM REPORT: CPT

## 2024-05-29 PROCEDURE — 99291 CRITICAL CARE FIRST HOUR: CPT

## 2024-05-29 PROCEDURE — 93306 TTE W/DOPPLER COMPLETE: CPT | Mod: 26

## 2024-05-29 RX ORDER — HEPARIN SODIUM 5000 [USP'U]/ML
1200 INJECTION INTRAVENOUS; SUBCUTANEOUS
Qty: 25000 | Refills: 0 | Status: DISCONTINUED | OUTPATIENT
Start: 2024-05-29 | End: 2024-05-30

## 2024-05-29 RX ORDER — HEPARIN SODIUM 5000 [USP'U]/ML
3000 INJECTION INTRAVENOUS; SUBCUTANEOUS EVERY 6 HOURS
Refills: 0 | Status: DISCONTINUED | OUTPATIENT
Start: 2024-05-29 | End: 2024-05-29

## 2024-05-29 RX ORDER — ACETAMINOPHEN 500 MG
650 TABLET ORAL EVERY 6 HOURS
Refills: 0 | Status: DISCONTINUED | OUTPATIENT
Start: 2024-05-29 | End: 2024-05-29

## 2024-05-29 RX ORDER — METOPROLOL TARTRATE 50 MG
100 TABLET ORAL DAILY
Refills: 0 | Status: DISCONTINUED | OUTPATIENT
Start: 2024-05-30 | End: 2024-05-30

## 2024-05-29 RX ORDER — HYDROMORPHONE HYDROCHLORIDE 2 MG/ML
0.5 INJECTION INTRAMUSCULAR; INTRAVENOUS; SUBCUTANEOUS ONCE
Refills: 0 | Status: DISCONTINUED | OUTPATIENT
Start: 2024-05-29 | End: 2024-05-29

## 2024-05-29 RX ORDER — MORPHINE SULFATE 50 MG/1
15 CAPSULE, EXTENDED RELEASE ORAL
Refills: 0 | Status: DISCONTINUED | OUTPATIENT
Start: 2024-05-29 | End: 2024-05-30

## 2024-05-29 RX ORDER — MORPHINE SULFATE 50 MG/1
4 CAPSULE, EXTENDED RELEASE ORAL ONCE
Refills: 0 | Status: DISCONTINUED | OUTPATIENT
Start: 2024-05-29 | End: 2024-05-29

## 2024-05-29 RX ORDER — CHOLECALCIFEROL (VITAMIN D3) 125 MCG
2000 CAPSULE ORAL DAILY
Refills: 0 | Status: DISCONTINUED | OUTPATIENT
Start: 2024-05-29 | End: 2024-06-07

## 2024-05-29 RX ORDER — METOPROLOL TARTRATE 50 MG
100 TABLET ORAL DAILY
Refills: 0 | Status: DISCONTINUED | OUTPATIENT
Start: 2024-05-29 | End: 2024-05-29

## 2024-05-29 RX ORDER — PANTOPRAZOLE SODIUM 20 MG/1
40 TABLET, DELAYED RELEASE ORAL
Refills: 0 | Status: DISCONTINUED | OUTPATIENT
Start: 2024-05-29 | End: 2024-06-07

## 2024-05-29 RX ORDER — HEPARIN SODIUM 5000 [USP'U]/ML
6500 INJECTION INTRAVENOUS; SUBCUTANEOUS EVERY 6 HOURS
Refills: 0 | Status: DISCONTINUED | OUTPATIENT
Start: 2024-05-29 | End: 2024-05-29

## 2024-05-29 RX ORDER — SODIUM CHLORIDE 9 MG/ML
500 INJECTION, SOLUTION INTRAVENOUS ONCE
Refills: 0 | Status: COMPLETED | OUTPATIENT
Start: 2024-05-29 | End: 2024-05-29

## 2024-05-29 RX ORDER — KETOROLAC TROMETHAMINE 30 MG/ML
30 SYRINGE (ML) INJECTION ONCE
Refills: 0 | Status: DISCONTINUED | OUTPATIENT
Start: 2024-05-29 | End: 2024-05-29

## 2024-05-29 RX ORDER — COLCHICINE 0.6 MG
0.6 TABLET ORAL
Refills: 0 | Status: DISCONTINUED | OUTPATIENT
Start: 2024-05-29 | End: 2024-06-07

## 2024-05-29 RX ORDER — TIOTROPIUM BROMIDE 18 UG/1
2 CAPSULE ORAL; RESPIRATORY (INHALATION) DAILY
Refills: 0 | Status: DISCONTINUED | OUTPATIENT
Start: 2024-05-29 | End: 2024-06-07

## 2024-05-29 RX ORDER — HEPARIN SODIUM 5000 [USP'U]/ML
3000 INJECTION INTRAVENOUS; SUBCUTANEOUS EVERY 6 HOURS
Refills: 0 | Status: DISCONTINUED | OUTPATIENT
Start: 2024-05-29 | End: 2024-05-30

## 2024-05-29 RX ORDER — LANOLIN ALCOHOL/MO/W.PET/CERES
3 CREAM (GRAM) TOPICAL AT BEDTIME
Refills: 0 | Status: DISCONTINUED | OUTPATIENT
Start: 2024-05-29 | End: 2024-06-07

## 2024-05-29 RX ORDER — AMPICILLIN SODIUM AND SULBACTAM SODIUM 250; 125 MG/ML; MG/ML
3 INJECTION, POWDER, FOR SUSPENSION INTRAMUSCULAR; INTRAVENOUS EVERY 6 HOURS
Refills: 0 | Status: DISCONTINUED | OUTPATIENT
Start: 2024-05-29 | End: 2024-05-30

## 2024-05-29 RX ORDER — HEPARIN SODIUM 5000 [USP'U]/ML
INJECTION INTRAVENOUS; SUBCUTANEOUS
Qty: 25000 | Refills: 0 | Status: DISCONTINUED | OUTPATIENT
Start: 2024-05-29 | End: 2024-05-29

## 2024-05-29 RX ORDER — ALPRAZOLAM 0.25 MG
1 TABLET ORAL
Refills: 0 | Status: DISCONTINUED | OUTPATIENT
Start: 2024-05-29 | End: 2024-06-05

## 2024-05-29 RX ORDER — FOLIC ACID 0.8 MG
1 TABLET ORAL DAILY
Refills: 0 | Status: DISCONTINUED | OUTPATIENT
Start: 2024-05-29 | End: 2024-06-07

## 2024-05-29 RX ORDER — BUDESONIDE AND FORMOTEROL FUMARATE DIHYDRATE 160; 4.5 UG/1; UG/1
2 AEROSOL RESPIRATORY (INHALATION)
Refills: 0 | Status: DISCONTINUED | OUTPATIENT
Start: 2024-05-29 | End: 2024-06-07

## 2024-05-29 RX ORDER — ACETAMINOPHEN 500 MG
650 TABLET ORAL EVERY 6 HOURS
Refills: 0 | Status: DISCONTINUED | OUTPATIENT
Start: 2024-05-29 | End: 2024-06-07

## 2024-05-29 RX ORDER — CHLORHEXIDINE GLUCONATE 213 G/1000ML
1 SOLUTION TOPICAL DAILY
Refills: 0 | Status: DISCONTINUED | OUTPATIENT
Start: 2024-05-29 | End: 2024-05-29

## 2024-05-29 RX ORDER — HEPARIN SODIUM 5000 [USP'U]/ML
6500 INJECTION INTRAVENOUS; SUBCUTANEOUS EVERY 6 HOURS
Refills: 0 | Status: DISCONTINUED | OUTPATIENT
Start: 2024-05-29 | End: 2024-05-30

## 2024-05-29 RX ORDER — ALBUTEROL 90 UG/1
2 AEROSOL, METERED ORAL EVERY 6 HOURS
Refills: 0 | Status: DISCONTINUED | OUTPATIENT
Start: 2024-05-29 | End: 2024-06-07

## 2024-05-29 RX ORDER — MORPHINE SULFATE 50 MG/1
15 CAPSULE, EXTENDED RELEASE ORAL
Refills: 0 | Status: DISCONTINUED | OUTPATIENT
Start: 2024-05-29 | End: 2024-05-29

## 2024-05-29 RX ADMIN — Medication 0.6 MILLIGRAM(S): at 05:52

## 2024-05-29 RX ADMIN — Medication 650 MILLIGRAM(S): at 05:52

## 2024-05-29 RX ADMIN — Medication 2000 UNIT(S): at 11:57

## 2024-05-29 RX ADMIN — MORPHINE SULFATE 60 MILLIGRAM(S): 50 CAPSULE, EXTENDED RELEASE ORAL at 08:25

## 2024-05-29 RX ADMIN — AMPICILLIN SODIUM AND SULBACTAM SODIUM 200 GRAM(S): 250; 125 INJECTION, POWDER, FOR SUSPENSION INTRAMUSCULAR; INTRAVENOUS at 22:17

## 2024-05-29 RX ADMIN — BUDESONIDE AND FORMOTEROL FUMARATE DIHYDRATE 2 PUFF(S): 160; 4.5 AEROSOL RESPIRATORY (INHALATION) at 11:58

## 2024-05-29 RX ADMIN — HYDROMORPHONE HYDROCHLORIDE 0.5 MILLIGRAM(S): 2 INJECTION INTRAMUSCULAR; INTRAVENOUS; SUBCUTANEOUS at 22:16

## 2024-05-29 RX ADMIN — MORPHINE SULFATE 4 MILLIGRAM(S): 50 CAPSULE, EXTENDED RELEASE ORAL at 06:52

## 2024-05-29 RX ADMIN — Medication 100 MILLIGRAM(S): at 17:19

## 2024-05-29 RX ADMIN — AMPICILLIN SODIUM AND SULBACTAM SODIUM 200 GRAM(S): 250; 125 INJECTION, POWDER, FOR SUSPENSION INTRAMUSCULAR; INTRAVENOUS at 11:58

## 2024-05-29 RX ADMIN — MORPHINE SULFATE 15 MILLIGRAM(S): 50 CAPSULE, EXTENDED RELEASE ORAL at 05:52

## 2024-05-29 RX ADMIN — CHLORHEXIDINE GLUCONATE 1 APPLICATION(S): 213 SOLUTION TOPICAL at 12:44

## 2024-05-29 RX ADMIN — Medication 1 MILLIGRAM(S): at 11:58

## 2024-05-29 RX ADMIN — PANTOPRAZOLE SODIUM 40 MILLIGRAM(S): 20 TABLET, DELAYED RELEASE ORAL at 05:51

## 2024-05-29 RX ADMIN — MORPHINE SULFATE 30 MILLIGRAM(S): 50 CAPSULE, EXTENDED RELEASE ORAL at 13:13

## 2024-05-29 RX ADMIN — MORPHINE SULFATE 30 MILLIGRAM(S): 50 CAPSULE, EXTENDED RELEASE ORAL at 17:18

## 2024-05-29 RX ADMIN — TIOTROPIUM BROMIDE 2 PUFF(S): 18 CAPSULE ORAL; RESPIRATORY (INHALATION) at 21:00

## 2024-05-29 RX ADMIN — BUDESONIDE AND FORMOTEROL FUMARATE DIHYDRATE 2 PUFF(S): 160; 4.5 AEROSOL RESPIRATORY (INHALATION) at 22:08

## 2024-05-29 RX ADMIN — Medication 650 MILLIGRAM(S): at 14:10

## 2024-05-29 RX ADMIN — MORPHINE SULFATE 30 MILLIGRAM(S): 50 CAPSULE, EXTENDED RELEASE ORAL at 12:43

## 2024-05-29 RX ADMIN — MORPHINE SULFATE 4 MILLIGRAM(S): 50 CAPSULE, EXTENDED RELEASE ORAL at 06:39

## 2024-05-29 RX ADMIN — HYDROMORPHONE HYDROCHLORIDE 0.5 MILLIGRAM(S): 2 INJECTION INTRAMUSCULAR; INTRAVENOUS; SUBCUTANEOUS at 19:30

## 2024-05-29 RX ADMIN — SODIUM CHLORIDE 500 MILLILITER(S): 9 INJECTION, SOLUTION INTRAVENOUS at 18:00

## 2024-05-29 RX ADMIN — Medication 1 MILLIGRAM(S): at 17:18

## 2024-05-29 RX ADMIN — HEPARIN SODIUM 0 UNIT(S)/HR: 5000 INJECTION INTRAVENOUS; SUBCUTANEOUS at 03:52

## 2024-05-29 RX ADMIN — Medication 100 MILLIGRAM(S): at 05:51

## 2024-05-29 RX ADMIN — MORPHINE SULFATE 30 MILLIGRAM(S): 50 CAPSULE, EXTENDED RELEASE ORAL at 17:48

## 2024-05-29 RX ADMIN — MORPHINE SULFATE 30 MILLIGRAM(S): 50 CAPSULE, EXTENDED RELEASE ORAL at 04:01

## 2024-05-29 RX ADMIN — AMPICILLIN SODIUM AND SULBACTAM SODIUM 200 GRAM(S): 250; 125 INJECTION, POWDER, FOR SUSPENSION INTRAMUSCULAR; INTRAVENOUS at 17:19

## 2024-05-29 RX ADMIN — MORPHINE SULFATE 15 MILLIGRAM(S): 50 CAPSULE, EXTENDED RELEASE ORAL at 23:45

## 2024-05-29 RX ADMIN — HEPARIN SODIUM 1200 UNIT(S)/HR: 5000 INJECTION INTRAVENOUS; SUBCUTANEOUS at 20:17

## 2024-05-29 RX ADMIN — MORPHINE SULFATE 15 MILLIGRAM(S): 50 CAPSULE, EXTENDED RELEASE ORAL at 06:14

## 2024-05-29 RX ADMIN — HEPARIN SODIUM 900 UNIT(S)/HR: 5000 INJECTION INTRAVENOUS; SUBCUTANEOUS at 04:55

## 2024-05-29 RX ADMIN — SODIUM CHLORIDE 75 MILLILITER(S): 9 INJECTION, SOLUTION INTRAVENOUS at 18:41

## 2024-05-29 RX ADMIN — MORPHINE SULFATE 15 MILLIGRAM(S): 50 CAPSULE, EXTENDED RELEASE ORAL at 23:53

## 2024-05-29 RX ADMIN — Medication 0.6 MILLIGRAM(S): at 22:16

## 2024-05-29 RX ADMIN — Medication 650 MILLIGRAM(S): at 06:14

## 2024-05-29 RX ADMIN — HYDROMORPHONE HYDROCHLORIDE 0.5 MILLIGRAM(S): 2 INJECTION INTRAMUSCULAR; INTRAVENOUS; SUBCUTANEOUS at 22:45

## 2024-05-29 RX ADMIN — Medication 650 MILLIGRAM(S): at 14:40

## 2024-05-29 RX ADMIN — Medication 1 MILLIGRAM(S): at 05:51

## 2024-05-29 RX ADMIN — HYDROMORPHONE HYDROCHLORIDE 0.5 MILLIGRAM(S): 2 INJECTION INTRAMUSCULAR; INTRAVENOUS; SUBCUTANEOUS at 18:52

## 2024-05-29 RX ADMIN — AMPICILLIN SODIUM AND SULBACTAM SODIUM 200 GRAM(S): 250; 125 INJECTION, POWDER, FOR SUSPENSION INTRAMUSCULAR; INTRAVENOUS at 05:51

## 2024-05-29 RX ADMIN — Medication 0.6 MILLIGRAM(S): at 17:18

## 2024-05-29 RX ADMIN — HEPARIN SODIUM 1100 UNIT(S)/HR: 5000 INJECTION INTRAVENOUS; SUBCUTANEOUS at 06:39

## 2024-05-29 RX ADMIN — TIOTROPIUM BROMIDE 2 PUFF(S): 18 CAPSULE ORAL; RESPIRATORY (INHALATION) at 08:20

## 2024-05-29 RX ADMIN — MORPHINE SULFATE 60 MILLIGRAM(S): 50 CAPSULE, EXTENDED RELEASE ORAL at 08:55

## 2024-05-29 RX ADMIN — SODIUM CHLORIDE 75 MILLILITER(S): 9 INJECTION, SOLUTION INTRAVENOUS at 18:10

## 2024-05-29 RX ADMIN — MORPHINE SULFATE 30 MILLIGRAM(S): 50 CAPSULE, EXTENDED RELEASE ORAL at 06:14

## 2024-05-29 RX ADMIN — Medication 1 MILLIGRAM(S): at 23:25

## 2024-05-29 NOTE — PROGRESS NOTE ADULT - ATTENDING COMMENTS
Plan as outlined above.  s/p successful pericardiocentesis - 950 cc bloody fluid drained. Drain in place.  Repeat TTE in AM.  Hypoxic post pericardiocentesis and requiring more O2 support. Check CXR.  Infectious work-up given fever spikes noted - ? pneumonia.

## 2024-05-29 NOTE — CHART NOTE - NSCHARTNOTEFT_GEN_A_CORE
PREOPERATIVE DAY OF PROCEDURE EVALUATION:  I have personally seen and examined the patient.  I agree with the history and physical which I have reviewed and noted any changes below.  (Signed electronically by __________)  05-29-24 @ 15:47      Pt is a 54 y/o F PMHx  of HTN, HLD, COPD on home O2, SLE, lupus anticoagulant, antiphospholipid syndrome,  rheumatoid arthritis, cervical cancer sp hysterectomy,  laryngeal cancer s/p CT, RT, HFpEF (06/21/19 EF 63%) s/p AICD+PPM, DVT/PE (on lovenox QD),  NSTEMI in 2015, subclavian stenosis s/p stent placement presenting for shortness of breath and pleuritic chest pain. Pt reports on Wed she woke up with pleuritic chest pain and increased SOB. Pt with recent admission 1 week ago for Pneumonia, was DC from hospital on Sunday with oral Abx regimen. States she did not feel well at home, continued to have SOB, intermittent fevers, and increasing need for O2 so she came back to the hospital. Patient had bedside echo that showed Large pericardial effusion with suggestion of tamponade and patient presents to cardiology department for Pericardiocentesis     < from: TTE Echo Complete w/ Contrast w/o Doppler (05.29.24 @ 07:47) >    Summary:   1. Normal global left ventricular systolic function.   2. LV Ejection Fraction by Hendricks's Method with a biplane EF of 64 %.   3. The left ventricular diastolic function could not be assessed in this   study.   4. Normal right ventricular size and function.   5. Large pericardial effusion, with evidence of early cardiac tamponade.   6. No hemodynamically significant valvular regurgitation or stenosis.   7. Compared to the previous study 5/21/24, the pericardial effusion has   increasedin size, and there are signs of cardiac tamponade.   8. Findings discussed with the CCU team.

## 2024-05-29 NOTE — PROGRESS NOTE ADULT - ASSESSMENT
Assessment and plan    #Large pericardial effusion, early tamponade physiology present based on bedside ECHO  #Community acquired pneumonia  #HFpEF  #SLE/APLS/RA  #Hx DVT/PE on lovenox  #Hx subclavian stenosis  #Hx COPD on home O2  #Hx of torsades  #NSTEMI in 2015      PLAN:    CNS: Avoid CNS Depressants. Pain control per home meds.     HEENT: Oral care. Aspiration precautions     PULMONARY: HOB @ 45. Monitor Pulse Ox. Keep 88-92% supplement with NC/non-rebreather. Mild bilateral pleural effusions on Ct. May consult pulmonology for POCUS and possible thoracentesis if patient remains hypoxic.     CARDIOVASCULAR: pleuritic chest pain, trop -ve*2, ekg with low voltage, no ischemic changes on ekg, no diffuse ST elevations to indicate pericarditis.  Large pericardial effusion. Early tamponade physiology based on bedside ECHO. C/w colchicine. IVC collapsable, LR at 75cc/hr. Goal directed fluid resuscitation. S/p pericardial drain with 950 cc bloody output on 5/29. Monitor drain output. Patient may need pericardial window. Repeat TTE in AM.    GI: GI prophylaxis. Dash diet.     RENAL: Avoid nephrotoxic agents     INFECTIOUS DISEASE: febrile 101.5 on 5/28 night. C/w Doxy and Unasyn to complete tx of pneumonia (last day 6/1). Ua negative.  FU BCx. Ucx. GI PCR. Obtain procal and RVP if febrile again.    HEMATOLOGICAL: Hold home Lovenox, heparin ggt, PTT goal 60-70.    ENDOCRINE: Monitor FS. Insulin protocol if needed FS goal 140-180    MUSCULOSKELETAL: Ambulate as tolerated       # Misc  - DVT Prophylaxis: heparin  - GI Prophylaxis: pantoprazole  - Diet: DASH  - Dispo: CCU

## 2024-05-29 NOTE — PROGRESS NOTE ADULT - SUBJECTIVE AND OBJECTIVE BOX
SUBJECTIVE:  HPI:  Pt is a 54 y/o F PMHx  of HTN, HLD, COPD on home O2, SLE, lupus anticoagulant, antiphospholipid syndrome,  rheumatoid arthritis, cervical cancer sp hysterectomy,  laryngeal cancer s/p CT, RT, HFpEF (06/21/19 EF 63%) s/p AICD+PPM, DVT/PE (on lovenox QD),  NSTEMI in 2015, subclavian stenosis s/p stent placement presenting for shortness of breath and pleuritic chest pain. Pt reports on Wed she woke up with pleuritic chest pain and increased SOB. Pt with recent admission 1 week ago for Pneumonia, was DC from hospital on Sunday with oral Abx regimen. States she did not feel well at home, continued to have SOB, intermittent fevers, and increasing need for O2 so she came back to the hospital.    Vitals: /77  RR 20 T99F --> 100.9F Sat 96% on 2L NC    CT Angio Chest PE Protocol w/ IV Cont significant for increased size large pericardial effusion    Labs: WBC 15.7 proBNP 208 Trop 10 Lactate 0.8    Admitted to CCU       (28 May 2024 21:24)      Patient is a 53y old Female who presents with a chief complaint of Large pericardial effusion (28 May 2024 21:24)    Currently admitted to medicine with the primary diagnosis of Acute dyspnea       Today is hospital day 1d.     PAST MEDICAL & SURGICAL HISTORY  Lupus    RA (rheumatoid arthritis)    HTN (hypertension)    CHF (congestive heart failure)    COPD (chronic obstructive pulmonary disease)    DVT (deep venous thrombosis)    Pulmonary embolism    History of laryngeal cancer    GERD (gastroesophageal reflux disease)    Cervical cancer    APS (antiphospholipid syndrome)    S/P appendectomy    S/P cholecystectomy    AICD (automatic cardioverter/defibrillator) present  [employer]    S/P hysterectomy    History of back surgery    H/O foot surgery    S/P eye surgery    Subclavian arterial stenosis        ALLERGIES:  Vantin (Other (Mild))  Plaquenil (Other)  adhesives (Rash; Urticaria)  Avelox (Other)  Arava (Anaphylaxis; Angioedema)    MEDICATIONS:  ACTIVE MEDICATIONS  acetaminophen     Tablet .. 650 milliGRAM(s) Oral every 6 hours PRN  albuterol    90 MICROgram(s) HFA Inhaler 2 Puff(s) Inhalation every 6 hours PRN  ALPRAZolam 1 milliGRAM(s) Oral four times a day  aluminum hydroxide/magnesium hydroxide/simethicone Suspension 30 milliLiter(s) Oral every 4 hours PRN  ampicillin/sulbactam  IVPB 3 Gram(s) IV Intermittent every 6 hours  budesonide  80 MICROgram(s)/formoterol 4.5 MICROgram(s) Inhaler 2 Puff(s) Inhalation two times a day  chlorhexidine 2% Cloths 1 Application(s) Topical daily  cholecalciferol 2000 Unit(s) Oral daily  colchicine 0.6 milliGRAM(s) Oral two times a day  doxycycline IVPB 100 milliGRAM(s) IV Intermittent every 12 hours  folic acid 1 milliGRAM(s) Oral daily  lactated ringers Bolus 500 milliLiter(s) IV Bolus once  lactated ringers. 1000 milliLiter(s) IV Continuous <Continuous>  melatonin 3 milliGRAM(s) Oral at bedtime PRN  morphine  IR 15 milliGRAM(s) Oral two times a day PRN  morphine ER Tablet 60 milliGRAM(s) Oral <User Schedule>  morphine ER Tablet 30 milliGRAM(s) Oral four times a day  ondansetron Injectable 4 milliGRAM(s) IV Push every 8 hours PRN  pantoprazole    Tablet 40 milliGRAM(s) Oral before breakfast  tiotropium 2.5 MICROgram(s) Inhaler 2 Puff(s) Inhalation daily      VITALS:   T(F): 99.5  HR: 92  BP: 137/65  RR: 18  SpO2: 93%    LABS:                        11.4   16.37 )-----------( 254      ( 29 May 2024 04:43 )             36.1     05-29    133<L>  |  95<L>  |  17  ----------------------------<  123<H>  4.8   |  22  |  1.1    Ca    9.2      29 May 2024 04:43  Mg     1.8     05-29    TPro  6.5  /  Alb  3.8  /  TBili  0.5  /  DBili  x   /  AST  29  /  ALT  45<H>  /  AlkPhos  172<H>  05-29    PT/INR - ( 28 May 2024 16:11 )   PT: 13.20 sec;   INR: 1.16 ratio         PTT - ( 29 May 2024 10:55 )  PTT:31.9 sec  Urinalysis Basic - ( 29 May 2024 04:43 )    Color: x / Appearance: x / SG: x / pH: x  Gluc: 123 mg/dL / Ketone: x  / Bili: x / Urobili: x   Blood: x / Protein: x / Nitrite: x   Leuk Esterase: x / RBC: x / WBC x   Sq Epi: x / Non Sq Epi: x / Bacteria: x        Sedimentation Rate, Erythrocyte: 72 mm/Hr *H* (05-29-24 @ 04:43)              PHYSICAL EXAM:  GEN: moderate chest pain, on non-rebreather   LUNGS: Clear to auscultation bilaterally   HEART: S1/S2 no added sounds.    ABD: Soft, non-tender, non-distended.   EXT: No pedal edema  NEURO: AAOX3           SUBJECTIVE:  HPI:  Pt is a 52 y/o F PMHx  of HTN, HLD, COPD on home O2, SLE, lupus anticoagulant, antiphospholipid syndrome,  rheumatoid arthritis, cervical cancer sp hysterectomy,  laryngeal cancer s/p CT, RT, HFpEF (06/21/19 EF 63%) s/p AICD+PPM, prolonged Qt with hx of torsades, DVT/PE (on lovenox QD instead of BID due to hx of intracranial bleed),  NSTEMI in 2015, Left subclavian vein thrombosis (due to PM wire) resulting in subclavian stenosis s/p stent placement presenting for shortness of breath and pleuritic chest pain. Pt reports on Wed she woke up with pleuritic chest pain and increased SOB. Pt with recent admission 1 week ago for Pneumonia, was DC from hospital on Sunday with oral Abx regimen and colchicine for pericardial effusion.  States she did not feel well at home, continued to have SOB, intermittent fevers, and increasing need for O2 so she came back to the hospital.    Vitals: /77  RR 20 T99F --> 100.9F Sat 96% on 2L NC    CT Angio Chest PE Protocol w/ IV Cont significant for increased size large pericardial effusion    Labs: WBC 15.7 proBNP 208 Trop 10 Lactate 0.8    Admitted to CCU for further managment       Currently admitted to medicine with the primary diagnosis of Acute dyspnea       Today is hospital day 1d.     PAST MEDICAL & SURGICAL HISTORY  Lupus    RA (rheumatoid arthritis)    HTN (hypertension)    CHF (congestive heart failure)    COPD (chronic obstructive pulmonary disease)    DVT (deep venous thrombosis)    Pulmonary embolism    History of laryngeal cancer    GERD (gastroesophageal reflux disease)    Cervical cancer    APS (antiphospholipid syndrome)    S/P appendectomy    S/P cholecystectomy    AICD (automatic cardioverter/defibrillator) present  [employer]    S/P hysterectomy    History of back surgery    H/O foot surgery    S/P eye surgery    Subclavian arterial stenosis        ALLERGIES:  Vantin (Other (Mild))  Plaquenil (Other)  adhesives (Rash; Urticaria)  Avelox (Other)  Arava (Anaphylaxis; Angioedema)    MEDICATIONS:  ACTIVE MEDICATIONS  acetaminophen     Tablet .. 650 milliGRAM(s) Oral every 6 hours PRN  albuterol    90 MICROgram(s) HFA Inhaler 2 Puff(s) Inhalation every 6 hours PRN  ALPRAZolam 1 milliGRAM(s) Oral four times a day  aluminum hydroxide/magnesium hydroxide/simethicone Suspension 30 milliLiter(s) Oral every 4 hours PRN  ampicillin/sulbactam  IVPB 3 Gram(s) IV Intermittent every 6 hours  budesonide  80 MICROgram(s)/formoterol 4.5 MICROgram(s) Inhaler 2 Puff(s) Inhalation two times a day  chlorhexidine 2% Cloths 1 Application(s) Topical daily  cholecalciferol 2000 Unit(s) Oral daily  colchicine 0.6 milliGRAM(s) Oral two times a day  doxycycline IVPB 100 milliGRAM(s) IV Intermittent every 12 hours  folic acid 1 milliGRAM(s) Oral daily  lactated ringers Bolus 500 milliLiter(s) IV Bolus once  lactated ringers. 1000 milliLiter(s) IV Continuous <Continuous>  melatonin 3 milliGRAM(s) Oral at bedtime PRN  morphine  IR 15 milliGRAM(s) Oral two times a day PRN  morphine ER Tablet 60 milliGRAM(s) Oral <User Schedule>  morphine ER Tablet 30 milliGRAM(s) Oral four times a day  ondansetron Injectable 4 milliGRAM(s) IV Push every 8 hours PRN  pantoprazole    Tablet 40 milliGRAM(s) Oral before breakfast  tiotropium 2.5 MICROgram(s) Inhaler 2 Puff(s) Inhalation daily      VITALS:   T(F): 99.5  HR: 92  BP: 137/65  RR: 18  SpO2: 93%    LABS:                        11.4   16.37 )-----------( 254      ( 29 May 2024 04:43 )             36.1     05-29    133<L>  |  95<L>  |  17  ----------------------------<  123<H>  4.8   |  22  |  1.1    Ca    9.2      29 May 2024 04:43  Mg     1.8     05-29    TPro  6.5  /  Alb  3.8  /  TBili  0.5  /  DBili  x   /  AST  29  /  ALT  45<H>  /  AlkPhos  172<H>  05-29    PT/INR - ( 28 May 2024 16:11 )   PT: 13.20 sec;   INR: 1.16 ratio         PTT - ( 29 May 2024 10:55 )  PTT:31.9 sec  Urinalysis Basic - ( 29 May 2024 04:43 )    Color: x / Appearance: x / SG: x / pH: x  Gluc: 123 mg/dL / Ketone: x  / Bili: x / Urobili: x   Blood: x / Protein: x / Nitrite: x   Leuk Esterase: x / RBC: x / WBC x   Sq Epi: x / Non Sq Epi: x / Bacteria: x        Sedimentation Rate, Erythrocyte: 72 mm/Hr *H* (05-29-24 @ 04:43)              PHYSICAL EXAM:  GEN: moderate chest pain, on non-rebreather   LUNGS: Clear to auscultation bilaterally   HEART: S1/S2 no added sounds.    ABD: Soft, non-tender, non-distended.   EXT: No pedal edema  NEURO: AAOX3

## 2024-05-29 NOTE — CHART NOTE - NSCHARTNOTEFT_GEN_A_CORE
Indication: Pericardial tamponade     Procedure:   Consent was obtained and a time-out was completed verifying correct patient, procedure, site, and positioning. The patient’s thorax was prepped and draped in sterile fashion. 2% Lidocaine was used to anesthetize the surrounding skin area. Ultrasound and flouroscopy was  used to identify the fluid and observe the needle entering the pericardial space. The needle was introduced into the pericardial space. Appropriate fluid return was obtained and fluid was removed for study. The needle was then removed. The patient tolerated the procedure well and there were no complications. Blood loss was minimal. Chest x-ray was performed to assess for pneumothorax.    950 ml fluid was removed.

## 2024-05-30 ENCOUNTER — RESULT REVIEW (OUTPATIENT)
Age: 54
End: 2024-05-30

## 2024-05-30 LAB
-  COAGULASE NEGATIVE STAPHYLOCOCCUS: SIGNIFICANT CHANGE UP
ALBUMIN FLD-MCNC: 3.2 G/DL — SIGNIFICANT CHANGE UP
ANION GAP SERPL CALC-SCNC: 11 MMOL/L — SIGNIFICANT CHANGE UP (ref 7–14)
APPEARANCE UR: CLEAR — SIGNIFICANT CHANGE UP
APTT BLD: 163 SEC — CRITICAL HIGH (ref 27–39.2)
APTT BLD: 69.2 SEC — HIGH (ref 27–39.2)
B PERT IGG+IGM PNL SER: ABNORMAL
BASOPHILS # BLD AUTO: 0.03 K/UL — SIGNIFICANT CHANGE UP (ref 0–0.2)
BASOPHILS NFR BLD AUTO: 0.2 % — SIGNIFICANT CHANGE UP (ref 0–1)
BILIRUB UR-MCNC: NEGATIVE — SIGNIFICANT CHANGE UP
BUN SERPL-MCNC: 13 MG/DL — SIGNIFICANT CHANGE UP (ref 10–20)
CALCIUM SERPL-MCNC: 8.2 MG/DL — LOW (ref 8.4–10.5)
CHLORIDE SERPL-SCNC: 97 MMOL/L — LOW (ref 98–110)
CO2 SERPL-SCNC: 25 MMOL/L — SIGNIFICANT CHANGE UP (ref 17–32)
COLOR FLD: YELLOW — SIGNIFICANT CHANGE UP
COLOR SPEC: YELLOW — SIGNIFICANT CHANGE UP
CREAT SERPL-MCNC: 1 MG/DL — SIGNIFICANT CHANGE UP (ref 0.7–1.5)
CULTURE RESULTS: NO GROWTH — SIGNIFICANT CHANGE UP
DIFF PNL FLD: NEGATIVE — SIGNIFICANT CHANGE UP
EGFR: 67 ML/MIN/1.73M2 — SIGNIFICANT CHANGE UP
EOSINOPHIL # BLD AUTO: 0.02 K/UL — SIGNIFICANT CHANGE UP (ref 0–0.7)
EOSINOPHIL NFR BLD AUTO: 0.1 % — SIGNIFICANT CHANGE UP (ref 0–8)
FLUID INTAKE SUBSTANCE CLASS: SIGNIFICANT CHANGE UP
FOLATE+VIT B12 SERBLD-IMP: 2 % — SIGNIFICANT CHANGE UP
GLUCOSE FLD-MCNC: 116 MG/DL — SIGNIFICANT CHANGE UP
GLUCOSE SERPL-MCNC: 148 MG/DL — HIGH (ref 70–99)
GLUCOSE UR QL: NEGATIVE MG/DL — SIGNIFICANT CHANGE UP
GRAM STN FLD: ABNORMAL
GRAM STN FLD: SIGNIFICANT CHANGE UP
HCT VFR BLD CALC: 28.6 % — LOW (ref 37–47)
HCT VFR BLD CALC: 31.6 % — LOW (ref 37–47)
HCT VFR BLD CALC: 33.5 % — LOW (ref 37–47)
HGB BLD-MCNC: 10.5 G/DL — LOW (ref 12–16)
HGB BLD-MCNC: 10.8 G/DL — LOW (ref 12–16)
HGB BLD-MCNC: 9.5 G/DL — LOW (ref 12–16)
IMM GRANULOCYTES NFR BLD AUTO: 0.9 % — HIGH (ref 0.1–0.3)
KETONES UR-MCNC: NEGATIVE MG/DL — SIGNIFICANT CHANGE UP
LDH SERPL L TO P-CCNC: 562 U/L — SIGNIFICANT CHANGE UP
LEUKOCYTE ESTERASE UR-ACNC: NEGATIVE — SIGNIFICANT CHANGE UP
LYMPHOCYTES # BLD AUTO: 15.3 % — LOW (ref 20.5–51.1)
LYMPHOCYTES # BLD AUTO: 2.14 K/UL — SIGNIFICANT CHANGE UP (ref 1.2–3.4)
LYMPHOCYTES # FLD: 8 — SIGNIFICANT CHANGE UP
MCHC RBC-ENTMCNC: 29 PG — SIGNIFICANT CHANGE UP (ref 27–31)
MCHC RBC-ENTMCNC: 29.5 PG — SIGNIFICANT CHANGE UP (ref 27–31)
MCHC RBC-ENTMCNC: 29.7 PG — SIGNIFICANT CHANGE UP (ref 27–31)
MCHC RBC-ENTMCNC: 32.2 G/DL — SIGNIFICANT CHANGE UP (ref 32–37)
MCHC RBC-ENTMCNC: 33.2 G/DL — SIGNIFICANT CHANGE UP (ref 32–37)
MCHC RBC-ENTMCNC: 33.2 G/DL — SIGNIFICANT CHANGE UP (ref 32–37)
MCV RBC AUTO: 88.8 FL — SIGNIFICANT CHANGE UP (ref 81–99)
MCV RBC AUTO: 89.3 FL — SIGNIFICANT CHANGE UP (ref 81–99)
MCV RBC AUTO: 90.1 FL — SIGNIFICANT CHANGE UP (ref 81–99)
METHOD TYPE: SIGNIFICANT CHANGE UP
MONOCYTES # BLD AUTO: 1.39 K/UL — HIGH (ref 0.1–0.6)
MONOCYTES NFR BLD AUTO: 9.9 % — HIGH (ref 1.7–9.3)
MONOS+MACROS # FLD: 39 % — SIGNIFICANT CHANGE UP
NEUTROPHILS # BLD AUTO: 10.27 K/UL — HIGH (ref 1.4–6.5)
NEUTROPHILS NFR BLD AUTO: 73.6 % — SIGNIFICANT CHANGE UP (ref 42.2–75.2)
NEUTROPHILS-BODY FLUID: 51 % — SIGNIFICANT CHANGE UP
NIGHT BLUE STAIN TISS: SIGNIFICANT CHANGE UP
NITRITE UR-MCNC: NEGATIVE — SIGNIFICANT CHANGE UP
NRBC # BLD: 0 /100 WBCS — SIGNIFICANT CHANGE UP (ref 0–0)
PH UR: 7 — SIGNIFICANT CHANGE UP (ref 5–8)
PLATELET # BLD AUTO: 255 K/UL — SIGNIFICANT CHANGE UP (ref 130–400)
PLATELET # BLD AUTO: 270 K/UL — SIGNIFICANT CHANGE UP (ref 130–400)
PLATELET # BLD AUTO: 291 K/UL — SIGNIFICANT CHANGE UP (ref 130–400)
PMV BLD: 10.9 FL — HIGH (ref 7.4–10.4)
PMV BLD: 11.1 FL — HIGH (ref 7.4–10.4)
PMV BLD: 11.2 FL — HIGH (ref 7.4–10.4)
POTASSIUM SERPL-MCNC: 3.8 MMOL/L — SIGNIFICANT CHANGE UP (ref 3.5–5)
POTASSIUM SERPL-SCNC: 3.8 MMOL/L — SIGNIFICANT CHANGE UP (ref 3.5–5)
PROT FLD-MCNC: 5.5 G/DL — SIGNIFICANT CHANGE UP
PROT UR-MCNC: SIGNIFICANT CHANGE UP MG/DL
RAPID RVP RESULT: SIGNIFICANT CHANGE UP
RBC # BLD: 3.22 M/UL — LOW (ref 4.2–5.4)
RBC # BLD: 3.54 M/UL — LOW (ref 4.2–5.4)
RBC # BLD: 3.72 M/UL — LOW (ref 4.2–5.4)
RBC # FLD: 13.5 % — SIGNIFICANT CHANGE UP (ref 11.5–14.5)
RBC # FLD: 13.7 % — SIGNIFICANT CHANGE UP (ref 11.5–14.5)
RBC # FLD: 13.8 % — SIGNIFICANT CHANGE UP (ref 11.5–14.5)
RCV VOL RI: 9000 /UL — HIGH (ref 0–0)
SARS-COV-2 RNA SPEC QL NAA+PROBE: SIGNIFICANT CHANGE UP
SODIUM SERPL-SCNC: 133 MMOL/L — LOW (ref 135–146)
SP GR SPEC: 1.02 — SIGNIFICANT CHANGE UP (ref 1–1.03)
SPECIMEN SOURCE: SIGNIFICANT CHANGE UP
TOTAL NUCLEATED CELL COUNT, BODY FLUID: 1798 /UL — SIGNIFICANT CHANGE UP
TUBE TYPE: SIGNIFICANT CHANGE UP
UROBILINOGEN FLD QL: 0.2 MG/DL — SIGNIFICANT CHANGE UP (ref 0.2–1)
WBC # BLD: 11.89 K/UL — HIGH (ref 4.8–10.8)
WBC # BLD: 13.98 K/UL — HIGH (ref 4.8–10.8)
WBC # BLD: 14.34 K/UL — HIGH (ref 4.8–10.8)
WBC # FLD AUTO: 11.89 K/UL — HIGH (ref 4.8–10.8)
WBC # FLD AUTO: 13.98 K/UL — HIGH (ref 4.8–10.8)
WBC # FLD AUTO: 14.34 K/UL — HIGH (ref 4.8–10.8)

## 2024-05-30 PROCEDURE — 93306 TTE W/DOPPLER COMPLETE: CPT | Mod: 26

## 2024-05-30 PROCEDURE — 32555 ASPIRATE PLEURA W/ IMAGING: CPT | Mod: LT

## 2024-05-30 PROCEDURE — 88305 TISSUE EXAM BY PATHOLOGIST: CPT | Mod: 26

## 2024-05-30 PROCEDURE — 71250 CT THORAX DX C-: CPT | Mod: 26

## 2024-05-30 PROCEDURE — 99291 CRITICAL CARE FIRST HOUR: CPT

## 2024-05-30 PROCEDURE — 71045 X-RAY EXAM CHEST 1 VIEW: CPT | Mod: 26

## 2024-05-30 PROCEDURE — 88112 CYTOPATH CELL ENHANCE TECH: CPT | Mod: 26

## 2024-05-30 PROCEDURE — 99223 1ST HOSP IP/OBS HIGH 75: CPT | Mod: 25

## 2024-05-30 PROCEDURE — 93010 ELECTROCARDIOGRAM REPORT: CPT

## 2024-05-30 RX ORDER — MORPHINE SULFATE 50 MG/1
30 CAPSULE, EXTENDED RELEASE ORAL
Refills: 0 | Status: DISCONTINUED | OUTPATIENT
Start: 2024-05-30 | End: 2024-05-30

## 2024-05-30 RX ORDER — SODIUM CHLORIDE 9 MG/ML
500 INJECTION, SOLUTION INTRAVENOUS ONCE
Refills: 0 | Status: COMPLETED | OUTPATIENT
Start: 2024-05-30 | End: 2024-05-30

## 2024-05-30 RX ORDER — HEPARIN SODIUM 5000 [USP'U]/ML
6500 INJECTION INTRAVENOUS; SUBCUTANEOUS EVERY 6 HOURS
Refills: 0 | Status: DISCONTINUED | OUTPATIENT
Start: 2024-05-30 | End: 2024-06-05

## 2024-05-30 RX ORDER — VANCOMYCIN HCL 1 G
VIAL (EA) INTRAVENOUS
Refills: 0 | Status: DISCONTINUED | OUTPATIENT
Start: 2024-05-30 | End: 2024-05-30

## 2024-05-30 RX ORDER — MORPHINE SULFATE 50 MG/1
60 CAPSULE, EXTENDED RELEASE ORAL
Refills: 0 | Status: DISCONTINUED | OUTPATIENT
Start: 2024-05-30 | End: 2024-05-30

## 2024-05-30 RX ORDER — PIPERACILLIN AND TAZOBACTAM 4; .5 G/20ML; G/20ML
3.38 INJECTION, POWDER, LYOPHILIZED, FOR SOLUTION INTRAVENOUS EVERY 8 HOURS
Refills: 0 | Status: COMPLETED | OUTPATIENT
Start: 2024-05-30 | End: 2024-06-06

## 2024-05-30 RX ORDER — HYDROMORPHONE HYDROCHLORIDE 2 MG/ML
0.5 INJECTION INTRAMUSCULAR; INTRAVENOUS; SUBCUTANEOUS ONCE
Refills: 0 | Status: DISCONTINUED | OUTPATIENT
Start: 2024-05-30 | End: 2024-05-30

## 2024-05-30 RX ORDER — HEPARIN SODIUM 5000 [USP'U]/ML
3000 INJECTION INTRAVENOUS; SUBCUTANEOUS EVERY 6 HOURS
Refills: 0 | Status: DISCONTINUED | OUTPATIENT
Start: 2024-05-30 | End: 2024-06-05

## 2024-05-30 RX ORDER — ACETAMINOPHEN 500 MG
1000 TABLET ORAL ONCE
Refills: 0 | Status: COMPLETED | OUTPATIENT
Start: 2024-05-30 | End: 2024-05-30

## 2024-05-30 RX ORDER — MORPHINE SULFATE 50 MG/1
30 CAPSULE, EXTENDED RELEASE ORAL
Refills: 0 | Status: DISCONTINUED | OUTPATIENT
Start: 2024-05-30 | End: 2024-05-31

## 2024-05-30 RX ORDER — HYDROMORPHONE HYDROCHLORIDE 2 MG/ML
1 INJECTION INTRAMUSCULAR; INTRAVENOUS; SUBCUTANEOUS ONCE
Refills: 0 | Status: DISCONTINUED | OUTPATIENT
Start: 2024-05-30 | End: 2024-05-30

## 2024-05-30 RX ORDER — FUROSEMIDE 40 MG
40 TABLET ORAL ONCE
Refills: 0 | Status: COMPLETED | OUTPATIENT
Start: 2024-05-30 | End: 2024-05-30

## 2024-05-30 RX ORDER — PIPERACILLIN AND TAZOBACTAM 4; .5 G/20ML; G/20ML
3.38 INJECTION, POWDER, LYOPHILIZED, FOR SOLUTION INTRAVENOUS ONCE
Refills: 0 | Status: COMPLETED | OUTPATIENT
Start: 2024-05-30 | End: 2024-05-30

## 2024-05-30 RX ORDER — SODIUM CHLORIDE 9 MG/ML
4 INJECTION INTRAMUSCULAR; INTRAVENOUS; SUBCUTANEOUS EVERY 12 HOURS
Refills: 0 | Status: DISCONTINUED | OUTPATIENT
Start: 2024-05-30 | End: 2024-06-07

## 2024-05-30 RX ORDER — MORPHINE SULFATE 50 MG/1
15 CAPSULE, EXTENDED RELEASE ORAL
Refills: 0 | Status: DISCONTINUED | OUTPATIENT
Start: 2024-05-30 | End: 2024-06-06

## 2024-05-30 RX ORDER — MORPHINE SULFATE 50 MG/1
60 CAPSULE, EXTENDED RELEASE ORAL
Refills: 0 | Status: DISCONTINUED | OUTPATIENT
Start: 2024-05-30 | End: 2024-06-06

## 2024-05-30 RX ORDER — VANCOMYCIN HCL 1 G
1250 VIAL (EA) INTRAVENOUS ONCE
Refills: 0 | Status: COMPLETED | OUTPATIENT
Start: 2024-05-30 | End: 2024-05-30

## 2024-05-30 RX ORDER — VANCOMYCIN HCL 1 G
1250 VIAL (EA) INTRAVENOUS EVERY 12 HOURS
Refills: 0 | Status: DISCONTINUED | OUTPATIENT
Start: 2024-05-30 | End: 2024-05-30

## 2024-05-30 RX ORDER — VANCOMYCIN HCL 1 G
1250 VIAL (EA) INTRAVENOUS EVERY 24 HOURS
Refills: 0 | Status: DISCONTINUED | OUTPATIENT
Start: 2024-05-31 | End: 2024-06-02

## 2024-05-30 RX ORDER — HEPARIN SODIUM 5000 [USP'U]/ML
1000 INJECTION INTRAVENOUS; SUBCUTANEOUS
Qty: 25000 | Refills: 0 | Status: DISCONTINUED | OUTPATIENT
Start: 2024-05-30 | End: 2024-06-05

## 2024-05-30 RX ADMIN — Medication 1 MILLIGRAM(S): at 18:22

## 2024-05-30 RX ADMIN — MORPHINE SULFATE 30 MILLIGRAM(S): 50 CAPSULE, EXTENDED RELEASE ORAL at 13:00

## 2024-05-30 RX ADMIN — PANTOPRAZOLE SODIUM 40 MILLIGRAM(S): 20 TABLET, DELAYED RELEASE ORAL at 06:08

## 2024-05-30 RX ADMIN — BUDESONIDE AND FORMOTEROL FUMARATE DIHYDRATE 2 PUFF(S): 160; 4.5 AEROSOL RESPIRATORY (INHALATION) at 20:30

## 2024-05-30 RX ADMIN — Medication 1000 MILLIGRAM(S): at 02:06

## 2024-05-30 RX ADMIN — MORPHINE SULFATE 30 MILLIGRAM(S): 50 CAPSULE, EXTENDED RELEASE ORAL at 11:00

## 2024-05-30 RX ADMIN — Medication 2000 UNIT(S): at 13:01

## 2024-05-30 RX ADMIN — MORPHINE SULFATE 30 MILLIGRAM(S): 50 CAPSULE, EXTENDED RELEASE ORAL at 18:48

## 2024-05-30 RX ADMIN — SODIUM CHLORIDE 4 MILLILITER(S): 9 INJECTION INTRAMUSCULAR; INTRAVENOUS; SUBCUTANEOUS at 18:37

## 2024-05-30 RX ADMIN — PIPERACILLIN AND TAZOBACTAM 200 GRAM(S): 4; .5 INJECTION, POWDER, LYOPHILIZED, FOR SOLUTION INTRAVENOUS at 02:11

## 2024-05-30 RX ADMIN — PIPERACILLIN AND TAZOBACTAM 25 GRAM(S): 4; .5 INJECTION, POWDER, LYOPHILIZED, FOR SOLUTION INTRAVENOUS at 23:39

## 2024-05-30 RX ADMIN — SODIUM CHLORIDE 1000 MILLILITER(S): 9 INJECTION, SOLUTION INTRAVENOUS at 16:35

## 2024-05-30 RX ADMIN — Medication 650 MILLIGRAM(S): at 14:23

## 2024-05-30 RX ADMIN — Medication 1 MILLIGRAM(S): at 05:13

## 2024-05-30 RX ADMIN — SODIUM CHLORIDE 500 MILLILITER(S): 9 INJECTION, SOLUTION INTRAVENOUS at 20:29

## 2024-05-30 RX ADMIN — Medication 1 MILLIGRAM(S): at 13:00

## 2024-05-30 RX ADMIN — HYDROMORPHONE HYDROCHLORIDE 0.5 MILLIGRAM(S): 2 INJECTION INTRAMUSCULAR; INTRAVENOUS; SUBCUTANEOUS at 10:45

## 2024-05-30 RX ADMIN — TIOTROPIUM BROMIDE 2 PUFF(S): 18 CAPSULE ORAL; RESPIRATORY (INHALATION) at 08:44

## 2024-05-30 RX ADMIN — Medication 1 MILLIGRAM(S): at 23:40

## 2024-05-30 RX ADMIN — MORPHINE SULFATE 30 MILLIGRAM(S): 50 CAPSULE, EXTENDED RELEASE ORAL at 23:40

## 2024-05-30 RX ADMIN — HEPARIN SODIUM 1000 UNIT(S)/HR: 5000 INJECTION INTRAVENOUS; SUBCUTANEOUS at 16:01

## 2024-05-30 RX ADMIN — Medication 1 MILLIGRAM(S): at 13:02

## 2024-05-30 RX ADMIN — HYDROMORPHONE HYDROCHLORIDE 1 MILLIGRAM(S): 2 INJECTION INTRAMUSCULAR; INTRAVENOUS; SUBCUTANEOUS at 14:51

## 2024-05-30 RX ADMIN — HEPARIN SODIUM 900 UNIT(S)/HR: 5000 INJECTION INTRAVENOUS; SUBCUTANEOUS at 03:59

## 2024-05-30 RX ADMIN — PIPERACILLIN AND TAZOBACTAM 25 GRAM(S): 4; .5 INJECTION, POWDER, LYOPHILIZED, FOR SOLUTION INTRAVENOUS at 15:47

## 2024-05-30 RX ADMIN — HEPARIN SODIUM 0 UNIT(S)/HR: 5000 INJECTION INTRAVENOUS; SUBCUTANEOUS at 02:28

## 2024-05-30 RX ADMIN — MORPHINE SULFATE 15 MILLIGRAM(S): 50 CAPSULE, EXTENDED RELEASE ORAL at 05:19

## 2024-05-30 RX ADMIN — BUDESONIDE AND FORMOTEROL FUMARATE DIHYDRATE 2 PUFF(S): 160; 4.5 AEROSOL RESPIRATORY (INHALATION) at 10:30

## 2024-05-30 RX ADMIN — HYDROMORPHONE HYDROCHLORIDE 0.5 MILLIGRAM(S): 2 INJECTION INTRAMUSCULAR; INTRAVENOUS; SUBCUTANEOUS at 11:00

## 2024-05-30 RX ADMIN — Medication 0.6 MILLIGRAM(S): at 18:22

## 2024-05-30 RX ADMIN — Medication 40 MILLIGRAM(S): at 04:00

## 2024-05-30 RX ADMIN — Medication 0.6 MILLIGRAM(S): at 05:13

## 2024-05-30 RX ADMIN — Medication 166.67 MILLIGRAM(S): at 02:28

## 2024-05-30 RX ADMIN — Medication 650 MILLIGRAM(S): at 15:00

## 2024-05-30 RX ADMIN — PIPERACILLIN AND TAZOBACTAM 25 GRAM(S): 4; .5 INJECTION, POWDER, LYOPHILIZED, FOR SOLUTION INTRAVENOUS at 05:12

## 2024-05-30 RX ADMIN — MORPHINE SULFATE 30 MILLIGRAM(S): 50 CAPSULE, EXTENDED RELEASE ORAL at 19:18

## 2024-05-30 RX ADMIN — MORPHINE SULFATE 15 MILLIGRAM(S): 50 CAPSULE, EXTENDED RELEASE ORAL at 05:29

## 2024-05-30 RX ADMIN — Medication 400 MILLIGRAM(S): at 01:51

## 2024-05-30 RX ADMIN — HYDROMORPHONE HYDROCHLORIDE 1 MILLIGRAM(S): 2 INJECTION INTRAMUSCULAR; INTRAVENOUS; SUBCUTANEOUS at 14:36

## 2024-05-30 RX ADMIN — HEPARIN SODIUM 1000 UNIT(S)/HR: 5000 INJECTION INTRAVENOUS; SUBCUTANEOUS at 23:39

## 2024-05-30 NOTE — CHART NOTE - NSCHARTNOTEFT_GEN_A_CORE
54 y/o F PMHx  of HTN, HLD, COPD on home O2, admitted for Large pericardial effusion, early tamponade and recent Community acquired pneumonia (currently on treatment), was febrile on admission 100.9, s/p pericardiocentesis 5/29, spiked high grade fever at midnight TEMP 103, with chills. All the workup for sepsis including blood culture, cbc, chest x-ray, UA was sent, , urine culture to be sent in morning (RN refused to send at night as per policy). Chest xray looks worse than the previous one, complete left lung opacification, patient was on non rebreather since she came back from procedure, it was switched to NC around 10PM, pt requiring 6L NC and satting 88%, apparently looks conformable breathing wise.   Patient was given Tylenol IV and antibiotic regimen changed to Zosyn 3.375gm q8 and Vancomycin 1250mg q12.   Will continue to monitor. 54 y/o F PMHx  of HTN, HLD, COPD on home O2, admitted for Large pericardial effusion, early tamponade and recent Community acquired pneumonia (currently on treatment), was febrile on admission 100.9, s/p pericardiocentesis 5/29, spiked high grade fever at midnight TEMP 103, with chills. All the workup for sepsis including blood culture, cbc, chest x-ray, UA was sent, , urine culture to be sent in morning (RN refused to send at night as per policy). Chest xray looks worse than the previous one, complete left lung opacification, patient was on non rebreather since she came back from procedure, it was switched to NC around 10PM, pt requiring 6L NC and satting between 88-90%, apparently looks conformable breathing wise.   Patient was given Tylenol IV, antibiotic regimen changed to Zosyn 3.375gm q8 and Vancomycin 1250mg q12 and Lasix 40mg IV stat.   Will continue to monitor. 54 y/o F PMHx  of HTN, HLD, COPD on home O2, admitted for Large pericardial effusion, early tamponade and recent Community acquired pneumonia (currently on treatment), was febrile on admission 100.9, s/p pericardiocentesis 5/29, spiked high grade fever at midnight TEMP 103, with chills. All the workup for sepsis including blood culture, cbc, chest x-ray, UA, RVP was sent, , urine culture to be sent in morning (RN refused to send at night as per policy). Chest xray looks worse than the previous one, complete left lung opacification, patient was on non rebreather since she came back from procedure, it was switched to NC around 10PM, pt requiring 6L NC and satting between 88-90%, apparently looks conformable breathing wise.   Patient was given Tylenol IV, antibiotic regimen changed to Zosyn 3.375gm q8 and Vancomycin 1250mg q12 and Lasix 40mg IV stat.   Will continue to monitor. 54 y/o F PMHx  of HTN, HLD, COPD on home O2, admitted for Large pericardial effusion, early tamponade and recent Community acquired pneumonia (currently on treatment), was febrile on admission 100.9, s/p pericardiocentesis 5/29, spiked high grade fever at midnight TEMP 103, with chills. All the workup for sepsis including blood culture, cbc, chest x-ray, UA, RVP was sent, , urine culture to be sent in morning (RN refused to send at night as per policy). Chest xray looks worse than the previous one, complete left lung opacification, patient was on non rebreather since she came back from procedure, it was switched to NC around 10PM, pt requiring 6L NC and satting between 88-90%, apparently looks conformable breathing wise.   Patient was given Tylenol IV, antibiotic regimen changed to Zosyn 3.375gm q8 and Vancomycin 1250mg q12 and Lasix 40mg IV stat.   Will continue to monitor.    Update:  04:20 AM, pulm fellow assessed the pt, possible mucous plug, pt was started on bipap.

## 2024-05-30 NOTE — PROCEDURE NOTE - ADDITIONAL PROCEDURE DETAILS
Left thoracentesis with 650cc removed   Fluid is turbid   CXR done and shows improvement   No need for bronchoscopy at this point   Will follow on fluid analysis
- - -

## 2024-05-30 NOTE — CONSULT NOTE ADULT - NSCONSULTADDITIONALINFOA_GEN_ALL_CORE
Search Terms: gwendolyn bauman, 1970Search Date: 05/30/2024 11:29:42 AM  The Drug Utilization Report below displays all of the controlled substance prescriptions, if any, that your patient has filled in the last twelve months. The information displayed on this report is compiled from pharmacy submissions to the Department, and accurately reflects the information as submitted by the pharmacies.    This report was requested by: Laverne Castillo | Reference #: [id number]    You have not added a ANGI number. Keeping your ANGI number(s) up to date on the My ANGI # tab will enable the separation of your prescriptions from others in the search results.    Practitioner Count: 4  Pharmacy Count: 1  Current Opioid Prescriptions: 2  Current Benzodiazepine Prescriptions: 1  Current Stimulant Prescriptions: 0      Patient Demographic Information (PDI)       PDI	First Name	Last Name	Birth Date	Gender	Street Address	[facility] Code  A	Gwendolyn Bauman	1970	Female	[address] [facility]	[address]    Prescription Information      PDI Filter:    PDI	Current Rx	Drug Type	Rx Written	Rx Dispensed	Drug	Quantity	Days Supply	Prescriber Name	Prescriber ANGI #	Payment Method	Dispenser  A	Y	B	05/18/2024	05/23/2024	alprazolam 1 mg tablet	120	30	Jo Bush MD	[id number]	Medicare	[id number]  A	Y	O	05/08/2024	05/20/2024	morphine sulf er 30 mg tablet	210	30	Cheryl Corey MD	[id number]	Medicare	[id number]  A	Y	O	05/08/2024	05/18/2024	morphine sulfate ir 15 mg tab	60	30	Cheryl Corey MD	[id number]	Medicare	[id number]  A	N	B	04/16/2024	04/22/2024	alprazolam 1 mg tablet	120	30	Jo Bush MD	[id number]	Medicare	[id number]  A	N	O	04/03/2024	04/18/2024	morphine sulfate ir 15 mg tab	60	30	Cheryl Corey MD	[id number]	Medicare	[id number]  A	N	O	04/03/2024	04/17/2024	morphine sulf er 30 mg tablet	210	30	Cheryl Corey MD	[id number]	Medicare	[id number]  A	N	B	03/06/2024	03/22/2024	alprazolam 1 mg tablet	120	30	Jo Bush MD	[id number]	Medicare	[id number]  A	N	O	03/14/2024	03/19/2024	morphine sulfate ir 15 mg tab	60	30	Stephanie Bhatt	[id number]	Medicare	[id number]  A	N	O	03/14/2024	03/19/2024	morphine sulf er 30 mg tablet	208	26	Danii Burnette	[id number]	Medicare	[id number]  A	N	B	02/08/2024	02/20/2024	alprazolam 1 mg tablet	120	30	Jo Bush MD	[id number]	Medicare	[id number]  A	N	O	02/14/2024	02/18/2024	morphine sulfate ir 15 mg tab	60	30	Stephanie Bhatt	[id number]	Medicare	[id number]

## 2024-05-30 NOTE — PROGRESS NOTE ADULT - ATTENDING COMMENTS
Plan as outlined above.  Developed mucus plugging overnight and required more O2 support.  Plan for CT chest and potential bronchoscopy +/- thoracentesis.  Started on nebs and chest PT.  CT chest showed infiltrate and pleural effusion. Thoracentesis done yielded 650cc fluid.  After thoracentesis, was able to wean from BiPAP to HFNC. Case d/w Pulm (Dr. Yamil López) - defer bronch for now.  Pericardial drain in place. TTE showed resolution of anterior effusion but with residual small posterior effusion. No tamponade.  Await pericardial fluid studies.  Antibiotic coverage broadened due to fever. Now on Zosyn/Vanc. Obtain ID eval.

## 2024-05-30 NOTE — PHARMACOTHERAPY INTERVENTION NOTE - COMMENTS
Patient started on empiric antibiotics with piperacillin/tazobactam + vancomycin for possible pneumonia. Recently discharged was treated for pneumonia during last admission with ampicillin/sulbactam + doxycyline and discharged on levofloxacin. MRSA swab during last admission was negative, recommend to repeat MRSA swab during this admission. Patient on vancomycin 1250 mg IV q12h for empiric treatment. Per [employer] Bayesian vancomycin dosing software, current regimen of vancomycin 1250 mg IV q12h predicts a supratherapeutic steady-state AUC/SASKIA of 1086 mg/L*hr (goal 400-600). A reduced regimen of 1250 mg IV q24h predicts a therapeutic steady-state AUC/SASKIA of 542.82 mg/L*hr. Recommended decreasing vancomycin dose to 1250mg IV q24h starting 5/31 AM. Recommended obtaining vancomycin level on 6/1 with 1600 labs prior to fourth dose of vancomycin regimen.
MD wants this patient on 30 mg q6h and 60 mg daily of MS CONTIN .

## 2024-05-30 NOTE — CONSULT NOTE ADULT - SUBJECTIVE AND OBJECTIVE BOX
Patient is a 53y old  Female who presents with a chief complaint of Large pericardial effusion (29 May 2024 17:55)      HPI:  Pt is a 54 y/o F PMHx  of HTN, HLD, COPD on home O2, SLE, lupus anticoagulant, antiphospholipid syndrome,  rheumatoid arthritis, cervical cancer sp hysterectomy,  laryngeal cancer s/p CT, RT, HFpEF (19 EF 63%) s/p AICD+PPM, DVT/PE (on lovenox QD),  NSTEMI in , subclavian stenosis s/p stent placement presenting for shortness of breath and pleuritic chest pain. Pt reports on Wed she woke up with pleuritic chest pain and increased SOB. Pt with recent admission 1 week ago for Pneumonia, was DC from hospital on  with oral Abx regimen. States she did not feel well at home, continued to have SOB, intermittent fevers, and increasing need for O2 so she came back to the hospital.    Vitals: /77  RR 20 T99F --> 100.9F Sat 96% on 2L NC    CT Angio Chest PE Protocol w/ IV Cont significant for increased size large pericardial effusion    Labs: WBC 15.7 proBNP 208 Trop 10 Lactate 0.8    Admitted to CCU       (28 May 2024 21:24)      PAST MEDICAL & SURGICAL HISTORY:  Lupus      RA (rheumatoid arthritis)      HTN (hypertension)      CHF (congestive heart failure)      COPD (chronic obstructive pulmonary disease)      DVT (deep venous thrombosis)      Pulmonary embolism      History of laryngeal cancer      GERD (gastroesophageal reflux disease)      Cervical cancer      APS (antiphospholipid syndrome)      S/P appendectomy      S/P cholecystectomy      AICD (automatic cardioverter/defibrillator) present  [employer]      S/P hysterectomy      History of back surgery      H/O foot surgery      S/P eye surgery      Subclavian arterial stenosis          FAMILY HISTORY:  Family history of cervical cancer    FH: thyroid cancer    :  No known cardiovacular family hisotry     ROS:  See HPI     Allergies    Vantin (Other (Mild))  Plaquenil (Other)  adhesives (Rash; Urticaria)  Avelox (Other)  Arava (Anaphylaxis; Angioedema)    Intolerances          PHYSICAL EXAM    ICU Vital Signs Last 24 Hrs  T(C): 37.1 (30 May 2024 04:00), Max: 39.4 (30 May 2024 01:00)  T(F): 98.8 (30 May 2024 04:00), Max: 103 (30 May 2024 01:00)  HR: 91 (30 May 2024 07:54) (80 - 102)  BP: 89/58 (30 May 2024 06:00) (86/49 - 142/67)  BP(mean): 70 (30 May 2024 06:00) (61 - 97)  ABP: --  ABP(mean): --  RR: 42 (30 May 2024 07:54) (16 - 63)  SpO2: 90% (30 May 2024 07:54) (88% - 98%)    O2 Parameters below as of 30 May 2024 07:54  Patient On (Oxygen Delivery Method): mask, nonrebreather  O2 Flow (L/min): 10  O2 Concentration (%): 100        General: tachypneic   HEENT:  JESSIE              Lymphatic system: No cervical LN   Lungs: Bilateral BS, decreased left side  Cardiovascular: Regular  Gastrointestinal: Soft, Positive BS  Musculoskeletal: No clubbing.  Moves all extremities.  Full range of motion   Skin: Warm.  Intact  Neurological: No motor or sensory deficit       24 @ 07:01  -  24 @ 07:00  --------------------------------------------------------  IN:    Heparin Infusion: 44 mL    Heparin Infusion: 120 mL    IV PiggyBack: 950 mL    Lactated Ringers: 1275 mL    Lactated Ringers Bolus: 500 mL    Oral Fluid: 200 mL  Total IN: 3089 mL    OUT:    Chest Tube (mL): 5 mL    Voided (mL): 2850 mL  Total OUT: 2855 mL    Total NET: 234 mL          LABS:                          10.8   13.98 )-----------( 291      ( 30 May 2024 02:13 )             33.5                                               05-30    133<L>  |  97<L>  |  13  ----------------------------<  148<H>  3.8   |  25  |  1.0    Ca    8.2<L>      30 May 2024 02:13  Mg     1.8     05-29    TPro  6.5  /  Alb  3.8  /  TBili  0.5  /  DBili  x   /  AST  29  /  ALT  45<H>  /  AlkPhos  172<H>  05-29      PT/INR - ( 28 May 2024 16:11 )   PT: 13.20 sec;   INR: 1.16 ratio         PTT - ( 30 May 2024 01:50 )  PTT:163.0 sec                                       Urinalysis Basic - ( 30 May 2024 02:40 )    Color: Yellow / Appearance: Clear / S.018 / pH: x  Gluc: x / Ketone: Negative mg/dL  / Bili: Negative / Urobili: 0.2 mg/dL   Blood: x / Protein: Trace mg/dL / Nitrite: Negative   Leuk Esterase: Negative / RBC: x / WBC x   Sq Epi: x / Non Sq Epi: x / Bacteria: x                                                  LIVER FUNCTIONS - ( 29 May 2024 04:43 )  Alb: 3.8 g/dL / Pro: 6.5 g/dL / ALK PHOS: 172 U/L / ALT: 45 U/L / AST: 29 U/L / GGT: x                                                  Culture - Body Fluid with Gram Stain (collected 29 May 2024 17:00)  Source: Pericardial Other, Pericardial Fluid  Gram Stain (30 May 2024 02:51):    polymorphonuclear leukocytes seen    No organisms seen    by cytocentrifuge    Culture - Blood (collected 28 May 2024 16:11)  Source: .Blood Blood  Preliminary Report (29 May 2024 23:10):    No growth at 24 hours    Culture - Blood (collected 28 May 2024 16:11)  Source: .Blood Blood  Preliminary Report (29 May 2024 23:11):    No growth at 24 hours                                                                                           X-Rays left opacity                                                                                     ECHO    MEDICATIONS  (STANDING):  ALPRAZolam 1 milliGRAM(s) Oral four times a day  budesonide  80 MICROgram(s)/formoterol 4.5 MICROgram(s) Inhaler 2 Puff(s) Inhalation two times a day  cholecalciferol 2000 Unit(s) Oral daily  colchicine 0.6 milliGRAM(s) Oral two times a day  folic acid 1 milliGRAM(s) Oral daily  heparin  Infusion. 1200 Unit(s)/Hr (12 mL/Hr) IV Continuous <Continuous>  morphine  IR 15 milliGRAM(s) Oral two times a day  pantoprazole    Tablet 40 milliGRAM(s) Oral before breakfast  piperacillin/tazobactam IVPB.. 3.375 Gram(s) IV Intermittent every 8 hours  tiotropium 2.5 MICROgram(s) Inhaler 2 Puff(s) Inhalation daily  vancomycin  IVPB 1250 milliGRAM(s) IV Intermittent every 12 hours  vancomycin  IVPB        MEDICATIONS  (PRN):  acetaminophen     Tablet .. 650 milliGRAM(s) Oral every 6 hours PRN Temp greater or equal to 38C (100.4F), Severe Pain (7 - 10)  albuterol    90 MICROgram(s) HFA Inhaler 2 Puff(s) Inhalation every 6 hours PRN Shortness of Breath and/or Wheezing  aluminum hydroxide/magnesium hydroxide/simethicone Suspension 30 milliLiter(s) Oral every 4 hours PRN Dyspepsia  heparin   Injectable 3000 Unit(s) IV Push every 6 hours PRN For aPTT between 40 - 57  heparin   Injectable 6500 Unit(s) IV Push every 6 hours PRN For aPTT less than 40  melatonin 3 milliGRAM(s) Oral at bedtime PRN Insomnia  morphine ER Tablet 30 milliGRAM(s) Oral four times a day PRN cont of therapy-home med

## 2024-05-30 NOTE — PROGRESS NOTE ADULT - SUBJECTIVE AND OBJECTIVE BOX
SUBJECTIVE:  HPI:  Pt is a 52 y/o F PMHx  of HTN, HLD, COPD on home O2, SLE, lupus anticoagulant, antiphospholipid syndrome,  rheumatoid arthritis, cervical cancer sp hysterectomy,  laryngeal cancer s/p CT, RT, HFpEF (19 EF 63%) s/p AICD+PPM, prolonged Qt with hx of torsades, DVT/PE (on lovenox QD instead of BID due to hx of intracranial bleed),  NSTEMI in , Left subclavian vein thrombosis (due to PM wire) resulting in subclavian stenosis s/p stent placement presenting for shortness of breath and pleuritic chest pain. Pt reports on Wed she woke up with pleuritic chest pain and increased SOB. Pt with recent admission 1 week ago for Pneumonia, was DC from hospital on  with oral Abx regimen and colchicine for pericardial effusion.  States she did not feel well at home, continued to have SOB, intermittent fevers, and increasing need for O2 so she came back to the hospital.    Vitals: /77  RR 20 T99F --> 100.9F Sat 96% on 2L NC    CT Angio Chest PE Protocol w/ IV Cont significant for increased size large pericardial effusion    Labs: WBC 15.7 proBNP 208 Trop 10 Lactate 0.8    Admitted to CCU for further managment        Today is hospital day 2d.     PAST MEDICAL & SURGICAL HISTORY  Lupus    RA (rheumatoid arthritis)    HTN (hypertension)    CHF (congestive heart failure)    COPD (chronic obstructive pulmonary disease)    DVT (deep venous thrombosis)    Pulmonary embolism    History of laryngeal cancer    GERD (gastroesophageal reflux disease)    Cervical cancer    APS (antiphospholipid syndrome)    S/P appendectomy    S/P cholecystectomy    AICD (automatic cardioverter/defibrillator) present  [employer]    S/P hysterectomy    History of back surgery    H/O foot surgery    S/P eye surgery    Subclavian arterial stenosis        ALLERGIES:  Vantin (Other (Mild))  Plaquenil (Other)  adhesives (Rash; Urticaria)  Avelox (Other)  Arava (Anaphylaxis; Angioedema)    MEDICATIONS:  ACTIVE MEDICATIONS  acetaminophen     Tablet .. 650 milliGRAM(s) Oral every 6 hours PRN  albuterol    90 MICROgram(s) HFA Inhaler 2 Puff(s) Inhalation every 6 hours PRN  ALPRAZolam 1 milliGRAM(s) Oral four times a day  aluminum hydroxide/magnesium hydroxide/simethicone Suspension 30 milliLiter(s) Oral every 4 hours PRN  budesonide  80 MICROgram(s)/formoterol 4.5 MICROgram(s) Inhaler 2 Puff(s) Inhalation two times a day  cholecalciferol 2000 Unit(s) Oral daily  colchicine 0.6 milliGRAM(s) Oral two times a day  folic acid 1 milliGRAM(s) Oral daily  heparin   Injectable 6500 Unit(s) IV Push every 6 hours PRN  heparin   Injectable 3000 Unit(s) IV Push every 6 hours PRN  heparin  Infusion. 1000 Unit(s)/Hr IV Continuous <Continuous>  melatonin 3 milliGRAM(s) Oral at bedtime PRN  morphine  IR 15 milliGRAM(s) Oral two times a day PRN  morphine ER Tablet 30 milliGRAM(s) Oral four times a day  morphine ER Tablet 60 milliGRAM(s) Oral <User Schedule>  pantoprazole    Tablet 40 milliGRAM(s) Oral before breakfast  piperacillin/tazobactam IVPB.. 3.375 Gram(s) IV Intermittent every 8 hours  sodium chloride 3%  Inhalation 4 milliLiter(s) Inhalation every 12 hours  tiotropium 2.5 MICROgram(s) Inhaler 2 Puff(s) Inhalation daily  vancomycin  IVPB      vancomycin  IVPB 1250 milliGRAM(s) IV Intermittent every 12 hours      VITALS:   T(F): 101.9  HR: 91  BP: 99/59  RR: 23  SpO2: 95%    LABS:                        10.8   13.98 )-----------( 291      ( 30 May 2024 02:13 )             33.5     05-30    133<L>  |  97<L>  |  13  ----------------------------<  148<H>  3.8   |  25  |  1.0    Ca    8.2<L>      30 May 2024 02:13  Mg     1.8     -    TPro  6.5  /  Alb  3.8  /  TBili  0.5  /  DBili  x   /  AST  29  /  ALT  45<H>  /  AlkPhos  172<H>  05    PT/INR - ( 28 May 2024 16:11 )   PT: 13.20 sec;   INR: 1.16 ratio         PTT - ( 30 May 2024 01:50 )  PTT:163.0 sec  Urinalysis Basic - ( 30 May 2024 02:40 )    Color: Yellow / Appearance: Clear / S.018 / pH: x  Gluc: x / Ketone: Negative mg/dL  / Bili: Negative / Urobili: 0.2 mg/dL   Blood: x / Protein: Trace mg/dL / Nitrite: Negative   Leuk Esterase: Negative / RBC: x / WBC x   Sq Epi: x / Non Sq Epi: x / Bacteria: x            Culture - Fungal, Body Fluid (collected 29 May 2024 17:00)  Source: .Body Fluid Other, Fluid  Preliminary Report (30 May 2024 09:28):    Testing in progress    Culture - Acid Fast - Body Fluid w/Smear (collected 29 May 2024 17:00)  Source: Pericardial Other, Pericardial Fluid    Culture - Body Fluid with Gram Stain (collected 29 May 2024 17:00)  Source: Pericardial Other, Pericardial Fluid  Gram Stain (30 May 2024 02:51):    polymorphonuclear leukocytes seen    No organisms seen    by cytocentrifuge    Culture - Blood (collected 28 May 2024 16:11)  Source: .Blood Blood  Preliminary Report (29 May 2024 23:10):    No growth at 24 hours    Culture - Blood (collected 28 May 2024 16:11)  Source: .Blood Blood  Preliminary Report (29 May 2024 23:11):    No growth at 24 hours    Culture - Urine (collected 28 May 2024 15:29)  Source: Clean Catch Clean Catch (Midstream)  Final Report (30 May 2024 09:29):    No growth              PHYSICAL EXAM:  GEN: moderate chest pain, on non-rebreather   LUNGS: Clear to auscultation bilaterally   HEART: S1/S2 no added sounds.    ABD: Soft, non-tender, non-distended.   EXT: No pedal edema  NEURO: AAOX3

## 2024-05-30 NOTE — CHART NOTE - NSCHARTNOTEFT_GEN_A_CORE
Pt with pericardial drain for about 24 hours. Total output in 24 hrs ~40cc.    Repeat TTE today with trace pericardial fluid anteriorly. Has larger amount of pericardial fluid posteriorly, likely loculated.    Will remove drain.

## 2024-05-30 NOTE — CONSULT NOTE ADULT - ASSESSMENT
A/P: Patient is a 53F with PMH of HTN, HLD, COPD on home O2, SLE, lupus anticoagulant, antiphospholipid syndrome,  rheumatoid arthritis, cervical cancer sp hysterectomy,  laryngeal cancer s/p CT, RT, HFpEF (06/21/19 EF 63%) s/p AICD+PPM, prolonged Qt with hx of torsades, DVT/PE (on lovenox QD instead of BID due to hx of intracranial bleed),  NSTEMI in 2015, Left subclavian vein thrombosis (due to PM wire) resulting in subclavian stenosis s/p stent placement presenting for shortness of breath and pleuritic chest pain. Pain medicine services now consulted for assistance managing patient's chronic back pain.    Patient seen and evaluated at bedside by me. Patient is a chronic high dose opioid user, states she takes: 60 MS contin qd, 30 MS contin q4hr PRN AND short acting 15 q4 prn. Patient Istop checked to confirm. Patient is complaining of 10/10 low back pain with radiating down bilateral lower extremities on current analgesic regimen. Patient denies any confusion, nausea, lethargy, or itching at this time. Attempted to call Dr. Corey, patient's pain medicine doctor to confirm dose but office is closed.     #Low back pain/lumbar radiculopathy   - tylenol 650mg q6h PO standing  - morphine ER 60mg PO daily standing   - morphine IR 15mg q8hr PRN for severe pain  - morphine ER 30mg q4hr PRN for severe pain  - lidocaine 4% patch    #opioid induced consipation  - bowel regimen as per primary team

## 2024-05-30 NOTE — CONSULT NOTE ADULT - ASSESSMENT
Impression:     Left pleural effusion - moderate   Large pericardial effusion s/p pericardiocentesis   Left lung atelectasis  Acute hypoxemic respiratory failure   History of COPD   SLE, lupus AC, APLS on AC   History of cervical and laryngeal cancer     Plan:     Bedside US with moderate left effusion   There is also component of atelectasis  Will attempt thora/chest tube placement   May need bronchsocopy as well if stable enough   Start HFNC to keep spo2 more than 92%   Follow pericardial fluid studies and cytology   Check cultures; continue Abx for now   No hypercapnia on blood gas   Heparin needs to be held prior to thora/chest tube if possible; PTT supratherapeutic this morning   Further care per Cardiology  Will follow    Impression:     Left pleural effusion - moderate   Large pericardial effusion s/p pericardiocentesis   Left lung atelectasis  Acute hypoxemic respiratory failure   History of COPD   SLE, lupus AC, APLS on AC   History of cervical and laryngeal cancer     Plan:     Bedside US with small left effusion - no large window for thoracentesis   There is also component of atelectasis  Check CT chest   Start HFNC to keep spo2 more than 92%   May need bronchoscopy if can tolerate   Etco2 monitoring in the meanwhile   Follow pericardial fluid studies and cytology   Check cultures; continue Abx for now   No hypercapnia on blood gas   Heparin needs to be held prior to bronch; PTT supratherapeutic this morning  Nebs as needed   Solumedrol 40mg BID   Chest PT and IPV   Further care per Cardiology  Will follow    Impression:     Left pleural effusion - moderate   Large pericardial effusion s/p pericardiocentesis   Left lung atelectasis  Acute hypoxemic respiratory failure   History of COPD   SLE, lupus AC, APLS on AC   History of cervical and laryngeal cancer     Plan:     Bedside US with moderateleft effusion   There is also component of atelectasis  Check CT chest   Start HFNC to keep spo2 more than 92%   Plan to start with thora; possible bronch if no improvement   Etco2 monitoring in the meanwhile   Follow pericardial fluid studies and cytology   Check cultures; continue Abx for now   No hypercapnia on blood gas   Heparin needs to be held prior to procedures; PTT supratherapeutic this morning  Resume this evening   Nebs as needed   Solumedrol 40mg BID   Agree with broad spectrum abx for now   Follow culture results   Chest PT   Further care per Cardiology  Will follow

## 2024-05-30 NOTE — PROGRESS NOTE ADULT - ASSESSMENT
#Large pericardial effusion, early tamponade physiology present based on bedside ECHO  #Left pleural effusion s/p thoracentesis  #Community acquired pneumonia  #Risk for sepsis  #HFpEF  #SLE/APLS/RA  #Hx DVT/PE on lovenox  #Hx subclavian stenosis  #Hx COPD on home O2  #Hx of torsades  #NSTEMI in 2015      PLAN:    CNS: Avoid CNS Depressants. Pain control per home meds.     HEENT: Oral care. Aspiration precautions     PULMONARY: HOB @ 45. Monitor Pulse Ox. Keep 88-92% on HFNC 50/50, Bipap at night and prn for desat. f/u Ct report. s/p left thoracentesis 650 cloudy fluid, f/u fluid tests.     CARDIOVASCULAR: pleuritic chest pain, trop -ve*2, ekg with low voltage, no ischemic changes on ekg, no diffuse ST elevations to indicate pericarditis.  Large pericardial effusion. Early tamponade physiology based on bedside ECHO. C/w colchicine. IVC collapsable. Goal directed fluid resuscitation. S/p pericardial drain with 950 cc bloody output on 5/29. drain output 40cc since insertion, plan to remove drain today. Patient may need pericardial window. Repeat TTE pending official report.     GI: GI prophylaxis. Dash diet.     RENAL: Avoid nephrotoxic agents     INFECTIOUS DISEASE: still febrile. Abx broadened to vanc zosyn. repeat infectious work up sent.  Ua negative.  FU BCx. Ucx. GI PCR. f/u ID.     HEMATOLOGICAL: Hold home Lovenox, heparin ggt, PTT goal 60-70. f/u heme/onc consult.     ENDOCRINE: Monitor FS. Insulin protocol if needed FS goal 140-180    MUSCULOSKELETAL: Ambulate as tolerated       # Misc  - DVT Prophylaxis: heparin  - GI Prophylaxis: pantoprazole  - Diet: DASH  - Dispo: CCU

## 2024-05-30 NOTE — CONSULT NOTE ADULT - SUBJECTIVE AND OBJECTIVE BOX
HPI: Patient is a 53F with PMH of HTN, HLD, COPD on home O2, SLE, lupus anticoagulant, antiphospholipid syndrome,  rheumatoid arthritis, cervical cancer sp hysterectomy,  laryngeal cancer s/p CT, RT, HFpEF (06/21/19 EF 63%) s/p AICD+PPM, prolonged Qt with hx of torsades, DVT/PE (on lovenox QD instead of BID due to hx of intracranial bleed),  NSTEMI in 2015, Left subclavian vein thrombosis (due to PM wire) resulting in subclavian stenosis s/p stent placement presenting for shortness of breath and pleuritic chest pain. Pain medicine services now consulted for assistance managing patient's chronic back pain.    Patient seen and evaluated at bedside by me. Patient is a chronic high dose opioid user, states she takes : 60 MS contin qd, 30 MS contin q4hr PRN AND short acting 15 q4 prn. Patient Istop checked to confirm. Patient is complaining of 10/10 low back pain with radiating down bilateral lower extremities on current analgesic regimen. Patient denies any confusion, nausea, lethargy, or itching at this time. Attempted to call Dr. Corey, patient's pain medicine doctor to confirm dose but office is closed.       Current Inpatient Medication Regimen:  acetaminophen     Tablet .. 650 milliGRAM(s) Oral every 6 hours PRN  albuterol    90 MICROgram(s) HFA Inhaler 2 Puff(s) Inhalation every 6 hours PRN  ALPRAZolam 1 milliGRAM(s) Oral four times a day  aluminum hydroxide/magnesium hydroxide/simethicone Suspension 30 milliLiter(s) Oral every 4 hours PRN  budesonide  80 MICROgram(s)/formoterol 4.5 MICROgram(s) Inhaler 2 Puff(s) Inhalation two times a day  cholecalciferol 2000 Unit(s) Oral daily  colchicine 0.6 milliGRAM(s) Oral two times a day  folic acid 1 milliGRAM(s) Oral daily  melatonin 3 milliGRAM(s) Oral at bedtime PRN  morphine  IR 15 milliGRAM(s) Oral two times a day  morphine ER Tablet 30 milliGRAM(s) Oral four times a day PRN  morphine ER Tablet 60 milliGRAM(s) Oral <User Schedule>  pantoprazole    Tablet 40 milliGRAM(s) Oral before breakfast  piperacillin/tazobactam IVPB.. 3.375 Gram(s) IV Intermittent every 8 hours  tiotropium 2.5 MICROgram(s) Inhaler 2 Puff(s) Inhalation daily  vancomycin  IVPB      vancomycin  IVPB 1250 milliGRAM(s) IV Intermittent every 12 hours      Allergies:  Vantin (Other (Mild))  Plaquenil (Other)  adhesives (Rash; Urticaria)  Avelox (Other)  Arava (Anaphylaxis; Angioedema)      Past Medical History:  Dyspnea    H/o or current diagnosis of HF- ACEI/ARB contraindication unknown    Lupus    AICD (automatic cardioverter/defibrillator) present    Throat cancer    RA (rheumatoid arthritis)    HTN (hypertension)    CHF (congestive heart failure)    COPD (chronic obstructive pulmonary disease)    DVT (deep venous thrombosis)    Pulmonary embolus    Pulmonary embolism    History of laryngeal cancer    GERD (gastroesophageal reflux disease)    Cervical cancer    APS (antiphospholipid syndrome)    Acute dyspnea    S/P appendectomy    S/P cholecystectomy    History of laryngeal cancer    AICD (automatic cardioverter/defibrillator) present    S/P hysterectomy    History of back surgery    H/O foot surgery    S/P eye surgery    Subclavian arterial stenosis      Review of Systems:  General: no fevers or chills  Eyes: no diplopia or blurred vision  ENT: no rhinorrhea  CV: no chest pain  Resp: BiPAP  GI: no abdominal pain, constipation, or diarrhea  : no urinary incontinence or dysuria  Neuro: weakness    Physical Exam:  T(C): 37.1 (05-30-24 @ 04:00), Max: 39.4 (05-30-24 @ 01:00)  HR: 88 (05-30-24 @ 08:51) (80 - 102)  BP: 89/58 (05-30-24 @ 06:00) (86/49 - 142/67)  RR: 22 (05-30-24 @ 08:51) (16 - 44)  SpO2: 91% (05-30-24 @ 08:51) (88% - 97%)  Gen: NAD  Eyes: no scleral icterus  Head: Normocephalic / Atraumatic  CV: no JVD  Lungs: nonlabored breathing  Abdomen: nondistended, soft  Back: tenderness to palpation  Neuro: AOx3  Extremities: limited ROM in upper/lower extremities  Psych: normal affect      Labs:  CBC  13.98 K/uL<H> [4.80 - 10.80] > 10.8 g/dL<L> [12.0 - 16.0] / 33.5 %<L> [37.0 - 47.0] < 291 K/uL [130 - 400]      BMP  133 mmol/L<L> [135 - 146] | 97 mmol/L<L> [98 - 110] | 13 mg/dL [10 - 20]  3.8 mmol/L [3.5 - 5.0] | 25 mmol/L [17 - 32] | 1.0 mg/dL [0.7 - 1.5]    148 mg/dL<H> [70 - 99]  CBC  14.34 K/uL<H> [4.80 - 10.80] > 10.5 g/dL<L> [12.0 - 16.0] / 31.6 %<L> [37.0 - 47.0] < 270 K/uL [130 - 400]      Opioid Risk Assessment Tool                                                                           Female       Male  Family History  Alcohol                                                              1                3  Illegal drugs                                                       2                3  Rx drugs                                                            4                4    Personal History   Alcohol                                                              3                3  Illegal drugs                                                       4                4  Rx drugs                                                            5                5    Age between 16—45 years                                1                1  History of preadolescent sexual abuse               3                0    Psychological disease  ADD, OCD, bipolar, schizophrenia                      2                2  Depression                                                       1                1    Total Score                                                      5              __    0 - 3 = low risk for future opioid abuse  4 - 7 = moderate risk for future opioid abuse  8+ = high risk for future opioid abuse

## 2024-05-31 ENCOUNTER — RESULT REVIEW (OUTPATIENT)
Age: 54
End: 2024-05-31

## 2024-05-31 LAB
ALBUMIN FLD-MCNC: 2.2 G/DL — SIGNIFICANT CHANGE UP
ALBUMIN SERPL ELPH-MCNC: 3 G/DL — LOW (ref 3.5–5.2)
ALP SERPL-CCNC: 113 U/L — SIGNIFICANT CHANGE UP (ref 30–115)
ALT FLD-CCNC: 20 U/L — SIGNIFICANT CHANGE UP (ref 0–41)
ANION GAP SERPL CALC-SCNC: 10 MMOL/L — SIGNIFICANT CHANGE UP (ref 7–14)
APTT BLD: 60 SEC — HIGH (ref 27–39.2)
AST SERPL-CCNC: 10 U/L — SIGNIFICANT CHANGE UP (ref 0–41)
BASOPHILS # BLD AUTO: 0.02 K/UL — SIGNIFICANT CHANGE UP (ref 0–0.2)
BASOPHILS NFR BLD AUTO: 0.2 % — SIGNIFICANT CHANGE UP (ref 0–1)
BILIRUB SERPL-MCNC: 0.3 MG/DL — SIGNIFICANT CHANGE UP (ref 0.2–1.2)
BUN SERPL-MCNC: 12 MG/DL — SIGNIFICANT CHANGE UP (ref 10–20)
CALCIUM SERPL-MCNC: 8.4 MG/DL — SIGNIFICANT CHANGE UP (ref 8.4–10.5)
CHLORIDE SERPL-SCNC: 98 MMOL/L — SIGNIFICANT CHANGE UP (ref 98–110)
CO2 SERPL-SCNC: 27 MMOL/L — SIGNIFICANT CHANGE UP (ref 17–32)
CREAT SERPL-MCNC: 1 MG/DL — SIGNIFICANT CHANGE UP (ref 0.7–1.5)
CULTURE RESULTS: ABNORMAL
EGFR: 67 ML/MIN/1.73M2 — SIGNIFICANT CHANGE UP
EOSINOPHIL # BLD AUTO: 0.09 K/UL — SIGNIFICANT CHANGE UP (ref 0–0.7)
EOSINOPHIL NFR BLD AUTO: 0.9 % — SIGNIFICANT CHANGE UP (ref 0–8)
GLUCOSE FLD-MCNC: 112 MG/DL — SIGNIFICANT CHANGE UP
GLUCOSE SERPL-MCNC: 111 MG/DL — HIGH (ref 70–99)
GRAM STN FLD: SIGNIFICANT CHANGE UP
HCT VFR BLD CALC: 30.2 % — LOW (ref 37–47)
HGB BLD-MCNC: 9.5 G/DL — LOW (ref 12–16)
IMM GRANULOCYTES NFR BLD AUTO: 0.8 % — HIGH (ref 0.1–0.3)
LACTATE SERPL-SCNC: 0.7 MMOL/L — SIGNIFICANT CHANGE UP (ref 0.7–2)
LDH SERPL L TO P-CCNC: 157 U/L — SIGNIFICANT CHANGE UP
LDH SERPL L TO P-CCNC: 246 — HIGH (ref 50–242)
LYMPHOCYTES # BLD AUTO: 2.55 K/UL — SIGNIFICANT CHANGE UP (ref 1.2–3.4)
LYMPHOCYTES # BLD AUTO: 24.8 % — SIGNIFICANT CHANGE UP (ref 20.5–51.1)
MAGNESIUM SERPL-MCNC: 1.7 MG/DL — LOW (ref 1.8–2.4)
MCHC RBC-ENTMCNC: 28.3 PG — SIGNIFICANT CHANGE UP (ref 27–31)
MCHC RBC-ENTMCNC: 31.5 G/DL — LOW (ref 32–37)
MCV RBC AUTO: 89.9 FL — SIGNIFICANT CHANGE UP (ref 81–99)
MONOCYTES # BLD AUTO: 1.23 K/UL — HIGH (ref 0.1–0.6)
MONOCYTES NFR BLD AUTO: 12 % — HIGH (ref 1.7–9.3)
NEUTROPHILS # BLD AUTO: 6.3 K/UL — SIGNIFICANT CHANGE UP (ref 1.4–6.5)
NEUTROPHILS NFR BLD AUTO: 61.3 % — SIGNIFICANT CHANGE UP (ref 42.2–75.2)
NON-GYNECOLOGICAL CYTOLOGY STUDY: SIGNIFICANT CHANGE UP
NRBC # BLD: 0 /100 WBCS — SIGNIFICANT CHANGE UP (ref 0–0)
ORGANISM # SPEC MICROSCOPIC CNT: ABNORMAL
ORGANISM # SPEC MICROSCOPIC CNT: SIGNIFICANT CHANGE UP
PH FLD: 7.7 — SIGNIFICANT CHANGE UP
PHOSPHATE SERPL-MCNC: 3.4 MG/DL — SIGNIFICANT CHANGE UP (ref 2.1–4.9)
PLATELET # BLD AUTO: 259 K/UL — SIGNIFICANT CHANGE UP (ref 130–400)
PMV BLD: 11.4 FL — HIGH (ref 7.4–10.4)
POTASSIUM SERPL-MCNC: 3.8 MMOL/L — SIGNIFICANT CHANGE UP (ref 3.5–5)
POTASSIUM SERPL-SCNC: 3.8 MMOL/L — SIGNIFICANT CHANGE UP (ref 3.5–5)
PROCALCITONIN SERPL-MCNC: 0.25 NG/ML — HIGH (ref 0.02–0.1)
PROT FLD-MCNC: 3.7 G/DL — SIGNIFICANT CHANGE UP
PROT SERPL-MCNC: 5.3 G/DL — LOW (ref 6–8)
RBC # BLD: 3.36 M/UL — LOW (ref 4.2–5.4)
RBC # FLD: 13.4 % — SIGNIFICANT CHANGE UP (ref 11.5–14.5)
SODIUM SERPL-SCNC: 135 MMOL/L — SIGNIFICANT CHANGE UP (ref 135–146)
SPECIMEN SOURCE: SIGNIFICANT CHANGE UP
SPECIMEN SOURCE: SIGNIFICANT CHANGE UP
WBC # BLD: 10.27 K/UL — SIGNIFICANT CHANGE UP (ref 4.8–10.8)
WBC # FLD AUTO: 10.27 K/UL — SIGNIFICANT CHANGE UP (ref 4.8–10.8)

## 2024-05-31 PROCEDURE — 71045 X-RAY EXAM CHEST 1 VIEW: CPT | Mod: 26

## 2024-05-31 PROCEDURE — 93010 ELECTROCARDIOGRAM REPORT: CPT

## 2024-05-31 PROCEDURE — 99233 SBSQ HOSP IP/OBS HIGH 50: CPT

## 2024-05-31 PROCEDURE — 99222 1ST HOSP IP/OBS MODERATE 55: CPT

## 2024-05-31 PROCEDURE — 99291 CRITICAL CARE FIRST HOUR: CPT

## 2024-05-31 RX ORDER — DEXTROSE 50 % IN WATER 50 %
25 SYRINGE (ML) INTRAVENOUS ONCE
Refills: 0 | Status: DISCONTINUED | OUTPATIENT
Start: 2024-05-31 | End: 2024-06-07

## 2024-05-31 RX ORDER — SODIUM CHLORIDE 9 MG/ML
1000 INJECTION, SOLUTION INTRAVENOUS
Refills: 0 | Status: DISCONTINUED | OUTPATIENT
Start: 2024-05-31 | End: 2024-06-07

## 2024-05-31 RX ORDER — DEXTROSE 50 % IN WATER 50 %
12.5 SYRINGE (ML) INTRAVENOUS ONCE
Refills: 0 | Status: DISCONTINUED | OUTPATIENT
Start: 2024-05-31 | End: 2024-06-07

## 2024-05-31 RX ORDER — CHLORHEXIDINE GLUCONATE 213 G/1000ML
1 SOLUTION TOPICAL
Refills: 0 | Status: DISCONTINUED | OUTPATIENT
Start: 2024-05-31 | End: 2024-06-07

## 2024-05-31 RX ORDER — DEXTROSE 50 % IN WATER 50 %
15 SYRINGE (ML) INTRAVENOUS ONCE
Refills: 0 | Status: DISCONTINUED | OUTPATIENT
Start: 2024-05-31 | End: 2024-06-07

## 2024-05-31 RX ORDER — IPRATROPIUM/ALBUTEROL SULFATE 18-103MCG
3 AEROSOL WITH ADAPTER (GRAM) INHALATION EVERY 12 HOURS
Refills: 0 | Status: DISCONTINUED | OUTPATIENT
Start: 2024-05-31 | End: 2024-06-04

## 2024-05-31 RX ORDER — MORPHINE SULFATE 50 MG/1
30 CAPSULE, EXTENDED RELEASE ORAL
Refills: 0 | Status: DISCONTINUED | OUTPATIENT
Start: 2024-05-31 | End: 2024-05-31

## 2024-05-31 RX ORDER — DEXTROSE 10 % IN WATER 10 %
125 INTRAVENOUS SOLUTION INTRAVENOUS ONCE
Refills: 0 | Status: DISCONTINUED | OUTPATIENT
Start: 2024-05-31 | End: 2024-06-07

## 2024-05-31 RX ORDER — INSULIN LISPRO 100/ML
VIAL (ML) SUBCUTANEOUS
Refills: 0 | Status: DISCONTINUED | OUTPATIENT
Start: 2024-05-31 | End: 2024-06-07

## 2024-05-31 RX ORDER — MAGNESIUM SULFATE 500 MG/ML
2 VIAL (ML) INJECTION ONCE
Refills: 0 | Status: COMPLETED | OUTPATIENT
Start: 2024-05-31 | End: 2024-05-31

## 2024-05-31 RX ORDER — MORPHINE SULFATE 50 MG/1
30 CAPSULE, EXTENDED RELEASE ORAL
Refills: 0 | Status: DISCONTINUED | OUTPATIENT
Start: 2024-05-31 | End: 2024-06-07

## 2024-05-31 RX ORDER — GLUCAGON INJECTION, SOLUTION 0.5 MG/.1ML
1 INJECTION, SOLUTION SUBCUTANEOUS ONCE
Refills: 0 | Status: DISCONTINUED | OUTPATIENT
Start: 2024-05-31 | End: 2024-06-07

## 2024-05-31 RX ADMIN — Medication 0.6 MILLIGRAM(S): at 05:32

## 2024-05-31 RX ADMIN — BUDESONIDE AND FORMOTEROL FUMARATE DIHYDRATE 2 PUFF(S): 160; 4.5 AEROSOL RESPIRATORY (INHALATION) at 08:32

## 2024-05-31 RX ADMIN — Medication 0.6 MILLIGRAM(S): at 17:57

## 2024-05-31 RX ADMIN — MORPHINE SULFATE 30 MILLIGRAM(S): 50 CAPSULE, EXTENDED RELEASE ORAL at 17:56

## 2024-05-31 RX ADMIN — PIPERACILLIN AND TAZOBACTAM 25 GRAM(S): 4; .5 INJECTION, POWDER, LYOPHILIZED, FOR SOLUTION INTRAVENOUS at 13:52

## 2024-05-31 RX ADMIN — MORPHINE SULFATE 60 MILLIGRAM(S): 50 CAPSULE, EXTENDED RELEASE ORAL at 09:00

## 2024-05-31 RX ADMIN — MORPHINE SULFATE 15 MILLIGRAM(S): 50 CAPSULE, EXTENDED RELEASE ORAL at 19:36

## 2024-05-31 RX ADMIN — Medication 1 MILLIGRAM(S): at 05:32

## 2024-05-31 RX ADMIN — MORPHINE SULFATE 30 MILLIGRAM(S): 50 CAPSULE, EXTENDED RELEASE ORAL at 05:32

## 2024-05-31 RX ADMIN — MORPHINE SULFATE 30 MILLIGRAM(S): 50 CAPSULE, EXTENDED RELEASE ORAL at 05:56

## 2024-05-31 RX ADMIN — MORPHINE SULFATE 30 MILLIGRAM(S): 50 CAPSULE, EXTENDED RELEASE ORAL at 22:41

## 2024-05-31 RX ADMIN — Medication 40 MILLIGRAM(S): at 08:30

## 2024-05-31 RX ADMIN — Medication 3 MILLILITER(S): at 20:16

## 2024-05-31 RX ADMIN — Medication 1 MILLIGRAM(S): at 21:26

## 2024-05-31 RX ADMIN — Medication 40 MILLIGRAM(S): at 08:03

## 2024-05-31 RX ADMIN — TIOTROPIUM BROMIDE 2 PUFF(S): 18 CAPSULE ORAL; RESPIRATORY (INHALATION) at 08:31

## 2024-05-31 RX ADMIN — PIPERACILLIN AND TAZOBACTAM 25 GRAM(S): 4; .5 INJECTION, POWDER, LYOPHILIZED, FOR SOLUTION INTRAVENOUS at 21:27

## 2024-05-31 RX ADMIN — Medication 1 MILLIGRAM(S): at 17:56

## 2024-05-31 RX ADMIN — Medication 25 GRAM(S): at 06:22

## 2024-05-31 RX ADMIN — PIPERACILLIN AND TAZOBACTAM 25 GRAM(S): 4; .5 INJECTION, POWDER, LYOPHILIZED, FOR SOLUTION INTRAVENOUS at 05:32

## 2024-05-31 RX ADMIN — Medication 166.67 MILLIGRAM(S): at 08:31

## 2024-05-31 RX ADMIN — Medication 2000 UNIT(S): at 12:35

## 2024-05-31 RX ADMIN — SODIUM CHLORIDE 4 MILLILITER(S): 9 INJECTION INTRAMUSCULAR; INTRAVENOUS; SUBCUTANEOUS at 20:16

## 2024-05-31 RX ADMIN — MORPHINE SULFATE 30 MILLIGRAM(S): 50 CAPSULE, EXTENDED RELEASE ORAL at 23:09

## 2024-05-31 RX ADMIN — MORPHINE SULFATE 30 MILLIGRAM(S): 50 CAPSULE, EXTENDED RELEASE ORAL at 06:15

## 2024-05-31 RX ADMIN — CHLORHEXIDINE GLUCONATE 1 APPLICATION(S): 213 SOLUTION TOPICAL at 12:33

## 2024-05-31 RX ADMIN — MORPHINE SULFATE 60 MILLIGRAM(S): 50 CAPSULE, EXTENDED RELEASE ORAL at 08:31

## 2024-05-31 RX ADMIN — Medication 1 MILLIGRAM(S): at 12:33

## 2024-05-31 RX ADMIN — HEPARIN SODIUM 1000 UNIT(S)/HR: 5000 INJECTION INTRAVENOUS; SUBCUTANEOUS at 06:02

## 2024-05-31 RX ADMIN — MORPHINE SULFATE 30 MILLIGRAM(S): 50 CAPSULE, EXTENDED RELEASE ORAL at 18:15

## 2024-05-31 RX ADMIN — MORPHINE SULFATE 15 MILLIGRAM(S): 50 CAPSULE, EXTENDED RELEASE ORAL at 22:05

## 2024-05-31 NOTE — CONSULT NOTE ADULT - SUBJECTIVE AND OBJECTIVE BOX
ILEANA MCNEIL  53y, Female  Allergy: Vantin (Other (Mild))  Plaquenil (Other)  adhesives (Rash; Urticaria)  Avelox (Other)  Arava (Anaphylaxis; Angioedema)      CHIEF COMPLAINT:   Large pericardial effusion (31 May 2024 08:19)      LOS  3d    HPI  HPI:  Pt is a 54 y/o F PMHx  of HTN, HLD, COPD on home O2, SLE, lupus anticoagulant, antiphospholipid syndrome,  rheumatoid arthritis, cervical cancer sp hysterectomy,  laryngeal cancer s/p CT, RT, HFpEF (06/21/19 EF 63%) s/p AICD+PPM, DVT/PE (on lovenox QD),  NSTEMI in 2015, subclavian stenosis s/p stent placement presenting for shortness of breath and pleuritic chest pain. Pt reports on Wed she woke up with pleuritic chest pain and increased SOB. Pt with recent admission 1 week ago for Pneumonia, was DC from hospital on Sunday with oral Abx regimen. States she did not feel well at home, continued to have SOB, intermittent fevers, and increasing need for O2 so she came back to the hospital.    Vitals: /77  RR 20 T99F --> 100.9F Sat 96% on 2L NC    CT Angio Chest PE Protocol w/ IV Cont significant for increased size large pericardial effusion    Labs: WBC 15.7 proBNP 208 Trop 10 Lactate 0.8    Admitted to CCU       (28 May 2024 21:24)      INFECTIOUS DISEASE HISTORY:  ID consulted for fever  Recently seen by ID last admission for possible PNA, at the time pericardial effusion noted  D/Stef with augmentin/doxy  Previous admission labs notable for pan-positive Coxsackie B titers    5/30 RVP (-)    s/p thoracentesis 5/30 WBC 1798; 51% NP, 39% lymph. CX NG     s/p pericardiocentesis 5/29 pericardial fluid NG ; WBC 2654; 51% NP    ID consulted for fevers.   Tm 5/28 100.9, 5/29 101.5, 5/30 103  WBC peak 5/29 17--> 10     ABX  ampicillin/sulbactam  IVPB 5/28-29  doxycycline IVPB 5/38-29  piperacillin/tazobactam IVPB.. 5/30-present  vancomycin  IVPB 5/30-present            Currently ordered for:  piperacillin/tazobactam IVPB.. 3.375 Gram(s) IV Intermittent every 8 hours  vancomycin  IVPB 1250 milliGRAM(s) IV Intermittent every 24 hours      PMH  PAST MEDICAL & SURGICAL HISTORY:  Lupus      RA (rheumatoid arthritis)      HTN (hypertension)      CHF (congestive heart failure)      COPD (chronic obstructive pulmonary disease)      DVT (deep venous thrombosis)      Pulmonary embolism      History of laryngeal cancer      GERD (gastroesophageal reflux disease)      Cervical cancer      APS (antiphospholipid syndrome)      S/P appendectomy      S/P cholecystectomy      AICD (automatic cardioverter/defibrillator) present  [employer]      S/P hysterectomy      History of back surgery      H/O foot surgery      S/P eye surgery      Subclavian arterial stenosis          FAMILY HISTORY  Family history of cervical cancer    FH: thyroid cancer        SOCIAL HISTORY  Social History:  Single, Lives At Home, Not Currently Occupied, Not Sexually Active (14 Jun 2023 20:18)        ROS  ***    VITALS:  T(F): 99.4, Max: 101.9 (05-30-24 @ 13:00)  HR: 89  BP: 85/52  RR: 17Vital Signs Last 24 Hrs  T(C): 37.4 (31 May 2024 04:00), Max: 38.8 (30 May 2024 13:00)  T(F): 99.4 (31 May 2024 04:00), Max: 101.9 (30 May 2024 13:00)  HR: 89 (31 May 2024 06:00) (75 - 100)  BP: 85/52 (31 May 2024 06:00) (70/40 - 121/57)  BP(mean): 65 (31 May 2024 06:00) (46 - 82)  RR: 17 (31 May 2024 07:32) (17 - 38)  SpO2: 99% (31 May 2024 07:32) (91% - 100%)    Parameters below as of 31 May 2024 07:32  Patient On (Oxygen Delivery Method): nasal cannula, high flow  O2 Flow (L/min): 60  O2 Concentration (%): 60    PHYSICAL EXAM:  ***    TESTS & MEASUREMENTS:                        9.5    10.27 )-----------( 259      ( 31 May 2024 05:00 )             30.2     05-31    135  |  98  |  12  ----------------------------<  111<H>  3.8   |  27  |  1.0    Ca    8.4      31 May 2024 05:00  Phos  3.4     05-31  Mg     1.7     05-31    TPro  5.3<L>  /  Alb  3.0<L>  /  TBili  0.3  /  DBili  x   /  AST  10  /  ALT  20  /  AlkPhos  113  05-31      LIVER FUNCTIONS - ( 31 May 2024 05:00 )  Alb: 3.0 g/dL / Pro: 5.3 g/dL / ALK PHOS: 113 U/L / ALT: 20 U/L / AST: 10 U/L / GGT: x           Urinalysis Basic - ( 31 May 2024 05:00 )    Color: x / Appearance: x / SG: x / pH: x  Gluc: 111 mg/dL / Ketone: x  / Bili: x / Urobili: x   Blood: x / Protein: x / Nitrite: x   Leuk Esterase: x / RBC: x / WBC x   Sq Epi: x / Non Sq Epi: x / Bacteria: x        Culture - Fungal, Body Fluid (collected 05-30-24 @ 13:54)  Source: Pleural Fl Pleural Fluid  Preliminary Report (05-31-24 @ 07:25):    Testing in progress    Culture - Body Fluid with Gram Stain (collected 05-30-24 @ 13:54)  Source: Pleural Fl Pleural Fluid  Gram Stain (05-31-24 @ 03:18):    polymorphonuclear leukocytes seen    No organisms seen    by cytocentrifuge    Culture - Fungal, Body Fluid (collected 05-29-24 @ 17:00)  Source: .Body Fluid Other, Fluid  Preliminary Report (05-30-24 @ 09:28):    Testing in progress    Culture - Acid Fast - Body Fluid w/Smear (collected 05-29-24 @ 17:00)  Source: Pericardial Other, Pericardial Fluid    Culture - Body Fluid with Gram Stain (collected 05-29-24 @ 17:00)  Source: Pericardial Other, Pericardial Fluid  Gram Stain (05-30-24 @ 02:51):    polymorphonuclear leukocytes seen    No organisms seen    by cytocentrifuge  Preliminary Report (05-30-24 @ 17:44):    No growth    Culture - Blood (collected 05-28-24 @ 16:11)  Source: .Blood Blood  Gram Stain (05-30-24 @ 19:04):    Growth in anaerobic bottle: Gram Positive Cocci in Clusters  Preliminary Report (05-30-24 @ 19:04):    Growth in anaerobic bottle: Gram Positive Cocci in Clusters    Direct identification is available within approximately 3-5    hours either by Blood Panel Multiplexed PCR or Direct    MALDI-TOF. Details: [email or web address]  Organism: Blood Culture PCR (05-30-24 @ 20:32)  Organism: Blood Culture PCR (05-30-24 @ 20:32)      Method Type: PCR      -  Coagulase negative Staphylococcus: Detec    Culture - Blood (collected 05-28-24 @ 16:11)  Source: .Blood Blood  Preliminary Report (05-30-24 @ 23:01):    No growth at 48 Hours    Culture - Urine (collected 05-28-24 @ 15:29)  Source: Clean Catch Clean Catch (Midstream)  Final Report (05-30-24 @ 09:29):    No growth    Culture - Stool (collected 05-23-24 @ 17:06)  Source: .Stool Feces  Final Report (05-25-24 @ 17:57):    No enteric pathogens isolated.    (Stool culture examined for Salmonella,    Shigella, Campylobacter, Aeromonas, Plesiomonas,    Vibrio, E.coli O157 and Yersinia)    Culture - Blood (collected 05-19-24 @ 19:59)  Source: .Blood Blood  Final Report (05-25-24 @ 07:00):    No growth at 5 days    Culture - Blood (collected 05-19-24 @ 19:59)  Source: .Blood Blood  Final Report (05-25-24 @ 07:00):    No growth at 5 days        Lactate, Blood: 0.7 mmol/L (05-31-24 @ 05:20)  Blood Gas Venous - Lactate: 0.9 mmol/L (05-28-24 @ 16:19)  Lactate, Blood: 0.8 mmol/L (05-28-24 @ 16:11)      INFECTIOUS DISEASES TESTING  Procalcitonin: 0.25 ng/mL (05-30-24 @ 15:53)  Rapid RVP Result: NotDetec (05-30-24 @ 07:12)  HIV-1/2 Combo Result: Nonreact (05-23-24 @ 11:50)  Hepatitis B Surface Antigen: Nonreact (05-23-24 @ 11:50)  MRSA PCR Result.: Negative (05-20-24 @ 23:15)  Legionella Antigen, Urine: Negative (05-20-24 @ 16:53)  Streptococcus pneumoniae Ag, Ur Result: Negative (05-20-24 @ 16:53)  Procalcitonin: 0.06 ng/mL (05-20-24 @ 06:31)      INFLAMMATORY MARKERS  Sedimentation Rate, Erythrocyte: 72 mm/Hr (05-29-24 @ 04:43)      RADIOLOGY & ADDITIONAL TESTS:  I have personally reviewed the radiographic imaging    CARDIOLOGY TESTING  EKG reviewed if performed    MEDICATIONS  ALPRAZolam 1 Oral four times a day  budesonide  80 MICROgram(s)/formoterol 4.5 MICROgram(s) Inhaler 2 Inhalation two times a day  cholecalciferol 2000 Oral daily  colchicine 0.6 Oral two times a day  folic acid 1 Oral daily  heparin  Infusion. 1000 IV Continuous <Continuous>  morphine ER Tablet 60 Oral <User Schedule>  pantoprazole    Tablet 40 Oral before breakfast  piperacillin/tazobactam IVPB.. 3.375 IV Intermittent every 8 hours  sodium chloride 3%  Inhalation 4 Inhalation every 12 hours  tiotropium 2.5 MICROgram(s) Inhaler 2 Inhalation daily  vancomycin  IVPB 1250 IV Intermittent every 24 hours        ANTIBIOTICS:  piperacillin/tazobactam IVPB.. 3.375 Gram(s) IV Intermittent every 8 hours  vancomycin  IVPB 1250 milliGRAM(s) IV Intermittent every 24 hours      ALLERGIES:  Vantin (Other (Mild))  Plaquenil (Other)  adhesives (Rash; Urticaria)  Avelox (Other)  Arava (Anaphylaxis; Angioedema)   ILEANA MCNEIL  53y, Female  Allergy: Vantin (Other (Mild))  Plaquenil (Other)  adhesives (Rash; Urticaria)  Avelox (Other)  Arava (Anaphylaxis; Angioedema)      CHIEF COMPLAINT:   Large pericardial effusion (31 May 2024 08:19)      LOS  3d    HPI  HPI:  Pt is a 54 y/o F PMHx  of HTN, HLD, COPD on home O2, SLE, lupus anticoagulant, antiphospholipid syndrome,  rheumatoid arthritis, cervical cancer sp hysterectomy,  laryngeal cancer s/p CT, RT, HFpEF (06/21/19 EF 63%) s/p AICD+PPM, DVT/PE (on lovenox QD),  NSTEMI in 2015, subclavian stenosis s/p stent placement presenting for shortness of breath and pleuritic chest pain. Pt reports on Wed she woke up with pleuritic chest pain and increased SOB. Pt with recent admission 1 week ago for Pneumonia, was DC from hospital on Sunday with oral Abx regimen. States she did not feel well at home, continued to have SOB, intermittent fevers, and increasing need for O2 so she came back to the hospital.    Vitals: /77  RR 20 T99F --> 100.9F Sat 96% on 2L NC    CT Angio Chest PE Protocol w/ IV Cont significant for increased size large pericardial effusion    Labs: WBC 15.7 proBNP 208 Trop 10 Lactate 0.8    Admitted to CCU       (28 May 2024 21:24)      INFECTIOUS DISEASE HISTORY:  ID consulted for fever  Recently seen by ID last admission for possible PNA, at the time pericardial effusion noted  D/Stef with augmentin/doxy  Previous admission labs notable for pan-positive Coxsackie B titers    5/30 RVP (-)    s/p thoracentesis 5/30 WBC 1798; 51% NP, 39% lymph. CX NG     s/p pericardiocentesis 5/29 pericardial fluid NG ; WBC 2654; 51% NP    ID consulted for fevers.   Tm 5/28 100.9, 5/29 101.5, 5/30 103  WBC peak 5/29 17--> 10     ABX  ampicillin/sulbactam  IVPB 5/28-29  doxycycline IVPB 5/38-29  piperacillin/tazobactam IVPB.. 5/30-present  vancomycin  IVPB 5/30-present            Currently ordered for:  piperacillin/tazobactam IVPB.. 3.375 Gram(s) IV Intermittent every 8 hours  vancomycin  IVPB 1250 milliGRAM(s) IV Intermittent every 24 hours      PMH  PAST MEDICAL & SURGICAL HISTORY:  Lupus      RA (rheumatoid arthritis)      HTN (hypertension)      CHF (congestive heart failure)      COPD (chronic obstructive pulmonary disease)      DVT (deep venous thrombosis)      Pulmonary embolism      History of laryngeal cancer      GERD (gastroesophageal reflux disease)      Cervical cancer      APS (antiphospholipid syndrome)      S/P appendectomy      S/P cholecystectomy      AICD (automatic cardioverter/defibrillator) present  [employer]      S/P hysterectomy      History of back surgery      H/O foot surgery      S/P eye surgery      Subclavian arterial stenosis          FAMILY HISTORY  Family history of cervical cancer    FH: thyroid cancer        SOCIAL HISTORY  Social History:  Single, Lives At Home, Not Currently Occupied, Not Sexually Active (14 Jun 2023 20:18)        ROS  General: Denies rigors, nightsweats  HEENT: Denies headache, rhinorrhea, sore throat, eye pain  CV: as noted above   PULM: as noted above   GI: Denies hematemesis, hematochezia, melena  : Denies discharge, hematuria  MSK: Denies arthralgias, myalgias  SKIN: Denies rash, lesions  NEURO: Denies paresthesias, weakness  PSYCH: Denies depression, anxiety     VITALS:  T(F): 99.4, Max: 101.9 (05-30-24 @ 13:00)  HR: 89  BP: 85/52  RR: 17Vital Signs Last 24 Hrs  T(C): 37.4 (31 May 2024 04:00), Max: 38.8 (30 May 2024 13:00)  T(F): 99.4 (31 May 2024 04:00), Max: 101.9 (30 May 2024 13:00)  HR: 89 (31 May 2024 06:00) (75 - 100)  BP: 85/52 (31 May 2024 06:00) (70/40 - 121/57)  BP(mean): 65 (31 May 2024 06:00) (46 - 82)  RR: 17 (31 May 2024 07:32) (17 - 38)  SpO2: 99% (31 May 2024 07:32) (91% - 100%)    Parameters below as of 31 May 2024 07:32  Patient On (Oxygen Delivery Method): nasal cannula, high flow  O2 Flow (L/min): 60  O2 Concentration (%): 60    PHYSICAL EXAM:  Gen: NAD, resting in bed HFNC  HEENT: Normocephalic, atraumatic  Neck: supple, no lymphadenopathy  CV: Regular rate & regular rhythm  Lungs: decreased BS at bases, no fremitus  Abdomen: Soft, BS present  Ext: Warm, well perfused  Neuro: non focal, awake  Skin: no rash, no erythema  Lines: no phlebitis     TESTS & MEASUREMENTS:                        9.5    10.27 )-----------( 259      ( 31 May 2024 05:00 )             30.2     05-31    135  |  98  |  12  ----------------------------<  111<H>  3.8   |  27  |  1.0    Ca    8.4      31 May 2024 05:00  Phos  3.4     05-31  Mg     1.7     05-31    TPro  5.3<L>  /  Alb  3.0<L>  /  TBili  0.3  /  DBili  x   /  AST  10  /  ALT  20  /  AlkPhos  113  05-31      LIVER FUNCTIONS - ( 31 May 2024 05:00 )  Alb: 3.0 g/dL / Pro: 5.3 g/dL / ALK PHOS: 113 U/L / ALT: 20 U/L / AST: 10 U/L / GGT: x           Urinalysis Basic - ( 31 May 2024 05:00 )    Color: x / Appearance: x / SG: x / pH: x  Gluc: 111 mg/dL / Ketone: x  / Bili: x / Urobili: x   Blood: x / Protein: x / Nitrite: x   Leuk Esterase: x / RBC: x / WBC x   Sq Epi: x / Non Sq Epi: x / Bacteria: x        Culture - Fungal, Body Fluid (collected 05-30-24 @ 13:54)  Source: Pleural Fl Pleural Fluid  Preliminary Report (05-31-24 @ 07:25):    Testing in progress    Culture - Body Fluid with Gram Stain (collected 05-30-24 @ 13:54)  Source: Pleural Fl Pleural Fluid  Gram Stain (05-31-24 @ 03:18):    polymorphonuclear leukocytes seen    No organisms seen    by cytocentrifuge    Culture - Fungal, Body Fluid (collected 05-29-24 @ 17:00)  Source: .Body Fluid Other, Fluid  Preliminary Report (05-30-24 @ 09:28):    Testing in progress    Culture - Acid Fast - Body Fluid w/Smear (collected 05-29-24 @ 17:00)  Source: Pericardial Other, Pericardial Fluid    Culture - Body Fluid with Gram Stain (collected 05-29-24 @ 17:00)  Source: Pericardial Other, Pericardial Fluid  Gram Stain (05-30-24 @ 02:51):    polymorphonuclear leukocytes seen    No organisms seen    by cytocentrifuge  Preliminary Report (05-30-24 @ 17:44):    No growth    Culture - Blood (collected 05-28-24 @ 16:11)  Source: .Blood Blood  Gram Stain (05-30-24 @ 19:04):    Growth in anaerobic bottle: Gram Positive Cocci in Clusters  Preliminary Report (05-30-24 @ 19:04):    Growth in anaerobic bottle: Gram Positive Cocci in Clusters    Direct identification is available within approximately 3-5    hours either by Blood Panel Multiplexed PCR or Direct    MALDI-TOF. Details: [email or web address]  Organism: Blood Culture PCR (05-30-24 @ 20:32)  Organism: Blood Culture PCR (05-30-24 @ 20:32)      Method Type: PCR      -  Coagulase negative Staphylococcus: Detec    Culture - Blood (collected 05-28-24 @ 16:11)  Source: .Blood Blood  Preliminary Report (05-30-24 @ 23:01):    No growth at 48 Hours    Culture - Urine (collected 05-28-24 @ 15:29)  Source: Clean Catch Clean Catch (Midstream)  Final Report (05-30-24 @ 09:29):    No growth    Culture - Stool (collected 05-23-24 @ 17:06)  Source: .Stool Feces  Final Report (05-25-24 @ 17:57):    No enteric pathogens isolated.    (Stool culture examined for Salmonella,    Shigella, Campylobacter, Aeromonas, Plesiomonas,    Vibrio, E.coli O157 and Yersinia)    Culture - Blood (collected 05-19-24 @ 19:59)  Source: .Blood Blood  Final Report (05-25-24 @ 07:00):    No growth at 5 days    Culture - Blood (collected 05-19-24 @ 19:59)  Source: .Blood Blood  Final Report (05-25-24 @ 07:00):    No growth at 5 days        Lactate, Blood: 0.7 mmol/L (05-31-24 @ 05:20)  Blood Gas Venous - Lactate: 0.9 mmol/L (05-28-24 @ 16:19)  Lactate, Blood: 0.8 mmol/L (05-28-24 @ 16:11)      INFECTIOUS DISEASES TESTING  Procalcitonin: 0.25 ng/mL (05-30-24 @ 15:53)  Rapid RVP Result: NotDetec (05-30-24 @ 07:12)  HIV-1/2 Combo Result: Nonreact (05-23-24 @ 11:50)  Hepatitis B Surface Antigen: Nonreact (05-23-24 @ 11:50)  MRSA PCR Result.: Negative (05-20-24 @ 23:15)  Legionella Antigen, Urine: Negative (05-20-24 @ 16:53)  Streptococcus pneumoniae Ag, Ur Result: Negative (05-20-24 @ 16:53)  Procalcitonin: 0.06 ng/mL (05-20-24 @ 06:31)      INFLAMMATORY MARKERS  Sedimentation Rate, Erythrocyte: 72 mm/Hr (05-29-24 @ 04:43)      RADIOLOGY & ADDITIONAL TESTS:  I have personally reviewed the radiographic imaging    CARDIOLOGY TESTING  EKG reviewed if performed    MEDICATIONS  ALPRAZolam 1 Oral four times a day  budesonide  80 MICROgram(s)/formoterol 4.5 MICROgram(s) Inhaler 2 Inhalation two times a day  cholecalciferol 2000 Oral daily  colchicine 0.6 Oral two times a day  folic acid 1 Oral daily  heparin  Infusion. 1000 IV Continuous <Continuous>  morphine ER Tablet 60 Oral <User Schedule>  pantoprazole    Tablet 40 Oral before breakfast  piperacillin/tazobactam IVPB.. 3.375 IV Intermittent every 8 hours  sodium chloride 3%  Inhalation 4 Inhalation every 12 hours  tiotropium 2.5 MICROgram(s) Inhaler 2 Inhalation daily  vancomycin  IVPB 1250 IV Intermittent every 24 hours        ANTIBIOTICS:  piperacillin/tazobactam IVPB.. 3.375 Gram(s) IV Intermittent every 8 hours  vancomycin  IVPB 1250 milliGRAM(s) IV Intermittent every 24 hours      ALLERGIES:  Vantin (Other (Mild))  Plaquenil (Other)  adhesives (Rash; Urticaria)  Avelox (Other)  Arava (Anaphylaxis; Angioedema)

## 2024-05-31 NOTE — PROGRESS NOTE ADULT - ATTENDING COMMENTS
Plan as outlined above.  Repeat TTE showed no reaccumulation of pericardial effusion.  POCUS lung did not show significant reaccumulation of pleural effusion.  Start Solumedrol 1mg/kg/day (concern for SLE exacerbation). Rheumatology consult.  Wean HFNC as tolerated. Currently 60/60.  Empiric antibiotic coverage for PNA with Zosyn/Vanco.  OOB to chair. Incentive spirometry advised.

## 2024-05-31 NOTE — PROGRESS NOTE ADULT - SUBJECTIVE AND OBJECTIVE BOX
SUBJECTIVE:  HPI:  Pt is a 52 y/o F PMHx  of HTN, HLD, COPD on home O2, SLE, lupus anticoagulant, antiphospholipid syndrome,  rheumatoid arthritis, cervical cancer sp hysterectomy,  laryngeal cancer s/p CT, RT, HFpEF (06/21/19 EF 63%) s/p AICD+PPM, prolonged Qt with hx of torsades, DVT/PE (on lovenox QD instead of BID due to hx of intracranial bleed),  NSTEMI in 2015, Left subclavian vein thrombosis (due to PM wire) resulting in subclavian stenosis s/p stent placement presenting for shortness of breath and pleuritic chest pain. Pt reports on Wed she woke up with pleuritic chest pain and increased SOB. Pt with recent admission 1 week ago for Pneumonia, was DC from hospital on Sunday with oral Abx regimen and colchicine for pericardial effusion.  States she did not feel well at home, continued to have SOB, intermittent fevers, and increasing need for O2 so she came back to the hospital.    Vitals: /77  RR 20 T99F --> 100.9F Sat 96% on 2L NC    CT Angio Chest PE Protocol w/ IV Cont significant for increased size large pericardial effusion    Labs: WBC 15.7 proBNP 208 Trop 10 Lactate 0.8    Admitted to CCU for further managment       Today is hospital day 3d.     PAST MEDICAL & SURGICAL HISTORY  Lupus    RA (rheumatoid arthritis)    HTN (hypertension)    CHF (congestive heart failure)    COPD (chronic obstructive pulmonary disease)    DVT (deep venous thrombosis)    Pulmonary embolism    History of laryngeal cancer    GERD (gastroesophageal reflux disease)    Cervical cancer    APS (antiphospholipid syndrome)    S/P appendectomy    S/P cholecystectomy    AICD (automatic cardioverter/defibrillator) present  [employer]    S/P hysterectomy    History of back surgery    H/O foot surgery    S/P eye surgery    Subclavian arterial stenosis        ALLERGIES:  Vantin (Other (Mild))  Plaquenil (Other)  adhesives (Rash; Urticaria)  Avelox (Other)  Arava (Anaphylaxis; Angioedema)    MEDICATIONS:  ACTIVE MEDICATIONS  acetaminophen     Tablet .. 650 milliGRAM(s) Oral every 6 hours PRN  albuterol    90 MICROgram(s) HFA Inhaler 2 Puff(s) Inhalation every 6 hours PRN  albuterol/ipratropium for Nebulization 3 milliLiter(s) Nebulizer every 12 hours  ALPRAZolam 1 milliGRAM(s) Oral four times a day  aluminum hydroxide/magnesium hydroxide/simethicone Suspension 30 milliLiter(s) Oral every 4 hours PRN  budesonide  80 MICROgram(s)/formoterol 4.5 MICROgram(s) Inhaler 2 Puff(s) Inhalation two times a day  chlorhexidine 2% Cloths 1 Application(s) Topical <User Schedule>  cholecalciferol 2000 Unit(s) Oral daily  colchicine 0.6 milliGRAM(s) Oral two times a day  folic acid 1 milliGRAM(s) Oral daily  heparin   Injectable 3000 Unit(s) IV Push every 6 hours PRN  heparin   Injectable 6500 Unit(s) IV Push every 6 hours PRN  heparin  Infusion. 1000 Unit(s)/Hr IV Continuous <Continuous>  melatonin 3 milliGRAM(s) Oral at bedtime PRN  morphine  IR 15 milliGRAM(s) Oral two times a day PRN  morphine ER Tablet 30 milliGRAM(s) Oral four times a day PRN  morphine ER Tablet 60 milliGRAM(s) Oral <User Schedule>  pantoprazole    Tablet 40 milliGRAM(s) Oral before breakfast  piperacillin/tazobactam IVPB.. 3.375 Gram(s) IV Intermittent every 8 hours  sodium chloride 3%  Inhalation 4 milliLiter(s) Inhalation every 12 hours  tiotropium 2.5 MICROgram(s) Inhaler 2 Puff(s) Inhalation daily  vancomycin  IVPB 1250 milliGRAM(s) IV Intermittent every 24 hours      VITALS:   T(F): 96.8  HR: 84  BP: 102/57  RR: 27  SpO2: 93%    LABS:                        9.5    10.27 )-----------( 259      ( 31 May 2024 05:00 )             30.2     05-31    135  |  98  |  12  ----------------------------<  111<H>  3.8   |  27  |  1.0    Ca    8.4      31 May 2024 05:00  Phos  3.4     05-31  Mg     1.7     05-31    TPro  5.3<L>  /  Alb  3.0<L>  /  TBili  0.3  /  DBili  x   /  AST  10  /  ALT  20  /  AlkPhos  113  05-31    PTT - ( 31 May 2024 05:00 )  PTT:60.0 sec  Urinalysis Basic - ( 31 May 2024 05:00 )    Color: x / Appearance: x / SG: x / pH: x  Gluc: 111 mg/dL / Ketone: x  / Bili: x / Urobili: x   Blood: x / Protein: x / Nitrite: x   Leuk Esterase: x / RBC: x / WBC x   Sq Epi: x / Non Sq Epi: x / Bacteria: x        Lactate, Blood: 0.7 mmol/L (05-31-24 @ 05:20)      Culture - Fungal, Body Fluid (collected 30 May 2024 13:54)  Source: Pleural Fl Pleural Fluid  Preliminary Report (31 May 2024 07:25):    Testing in progress    Culture - Body Fluid with Gram Stain (collected 30 May 2024 13:54)  Source: Pleural Fl Pleural Fluid  Gram Stain (31 May 2024 03:18):    polymorphonuclear leukocytes seen    No organisms seen    by cytocentrifuge    Culture - Blood (collected 30 May 2024 02:13)  Source: .Blood Blood-Peripheral  Preliminary Report (31 May 2024 10:02):    No growth at 24 hours    Culture - Fungal, Body Fluid (collected 29 May 2024 17:00)  Source: .Body Fluid Other, Fluid  Preliminary Report (30 May 2024 09:28):    Testing in progress    Culture - Acid Fast - Body Fluid w/Smear (collected 29 May 2024 17:00)  Source: Pericardial Other, Pericardial Fluid    Culture - Body Fluid with Gram Stain (collected 29 May 2024 17:00)  Source: Pericardial Other, Pericardial Fluid  Gram Stain (30 May 2024 02:51):    polymorphonuclear leukocytes seen    No organisms seen    by cytocentrifuge  Preliminary Report (30 May 2024 17:44):    No growth              PHYSICAL EXAM:  GEN: moderate chest pain, on non-rebreather   LUNGS: Clear to auscultation bilaterally   HEART: S1/S2 no added sounds.    ABD: Soft, non-tender, non-distended.   EXT: No pedal edema  NEURO: AAOX3

## 2024-05-31 NOTE — PROGRESS NOTE ADULT - ASSESSMENT
Impression:     Left pleural effusion - moderate   Large pericardial effusion s/p pericardiocentesis   Left lung atelectasis  Acute hypoxemic respiratory failure   History of COPD   SLE, lupus AC, APLS on AC   History of cervical and laryngeal cancer     Plan:     S/P thoracentesis with 650cc removed   Fluid analysis pending   CT chest noted   Repeat CXR better   Wean down Fio2   1 blood culture positive; contaminant ? ID follow up   Remains on broad spectrum abx; Vanc trough   No obvious indications for bronch   Follow pericardial fluid studies and cytology   No hypercapnia on blood gas   Resumed on heparin; PTT therapeutic   ESR and CRP elevated; Rheum meval given SLE and APLS syndrome history   Steroids: Solumedrol 1 mg / kg for now: 80mg daily   Chest PT   Further care per Cardiology  Will follow

## 2024-05-31 NOTE — PROGRESS NOTE ADULT - SUBJECTIVE AND OBJECTIVE BOX
Patient is a 53y old  Female who presents with a chief complaint of Large pericardial effusion (30 May 2024 15:53)        Over Night Events:    Thora done   On HFNC   Better overall   Febrile         ROS:  See HPI    PHYSICAL EXAM    ICU Vital Signs Last 24 Hrs  T(C): 37.4 (31 May 2024 04:00), Max: 38.8 (30 May 2024 13:00)  T(F): 99.4 (31 May 2024 04:00), Max: 101.9 (30 May 2024 13:00)  HR: 89 (31 May 2024 06:00) (75 - 100)  BP: 85/52 (31 May 2024 06:00) (70/40 - 121/57)  BP(mean): 65 (31 May 2024 06:00) (46 - 82)  ABP: --  ABP(mean): --  RR: 33 (31 May 2024 06:00) (19 - 38)  SpO2: 94% (31 May 2024 06:00) (91% - 100%)    O2 Parameters below as of 31 May 2024 07:00  Patient On (Oxygen Delivery Method): nasal cannula, high flow  O2 Flow (L/min): 60  O2 Concentration (%): 80        General: NAD   HEENT: JESSIE             Lymphatic system: No cervical LN   Lungs: Bilateral BS, coarse   Cardiovascular: Regular   Gastrointestinal: Soft, Positive BS  Extremities: No clubbing.  Moves extremities.  Full Range of motion   Skin: Warm, intact  Neurological: No motor or sensory deficit       05-30-24 @ 07:01  -  05-31-24 @ 07:00  --------------------------------------------------------  IN:    Heparin Infusion: 9 mL    Heparin Infusion: 150 mL    IV PiggyBack: 250 mL    Lactated Ringers Bolus: 1000 mL    Oral Fluid: 180 mL  Total IN: 1589 mL    OUT:    Chest Tube (mL): 25 mL    Other (mL): 650 mL    Voided (mL): 2550 mL  Total OUT: 3225 mL    Total NET: -1636 mL          LABS:                            9.5    10.27 )-----------( 259      ( 31 May 2024 05:00 )             30.2                                               05-31    135  |  98  |  12  ----------------------------<  111<H>  3.8   |  27  |  1.0    Ca    8.4      31 May 2024 05:00  Phos  3.4     05-31  Mg     1.7     05-31    TPro  5.3<L>  /  Alb  3.0<L>  /  TBili  0.3  /  DBili  x   /  AST  10  /  ALT  20  /  AlkPhos  113  05-31      PTT - ( 31 May 2024 05:00 )  PTT:60.0 sec                                       Urinalysis Basic - ( 31 May 2024 05:00 )    Color: x / Appearance: x / SG: x / pH: x  Gluc: 111 mg/dL / Ketone: x  / Bili: x / Urobili: x   Blood: x / Protein: x / Nitrite: x   Leuk Esterase: x / RBC: x / WBC x   Sq Epi: x / Non Sq Epi: x / Bacteria: x                                                  LIVER FUNCTIONS - ( 31 May 2024 05:00 )  Alb: 3.0 g/dL / Pro: 5.3 g/dL / ALK PHOS: 113 U/L / ALT: 20 U/L / AST: 10 U/L / GGT: x                                                  Culture - Fungal, Body Fluid (collected 30 May 2024 13:54)  Source: Pleural Fl Pleural Fluid  Preliminary Report (31 May 2024 07:25):    Testing in progress    Culture - Body Fluid with Gram Stain (collected 30 May 2024 13:54)  Source: Pleural Fl Pleural Fluid  Gram Stain (31 May 2024 03:18):    polymorphonuclear leukocytes seen    No organisms seen    by cytocentrifuge    Culture - Fungal, Body Fluid (collected 29 May 2024 17:00)  Source: .Body Fluid Other, Fluid  Preliminary Report (30 May 2024 09:28):    Testing in progress    Culture - Acid Fast - Body Fluid w/Smear (collected 29 May 2024 17:00)  Source: Pericardial Other, Pericardial Fluid    Culture - Body Fluid with Gram Stain (collected 29 May 2024 17:00)  Source: Pericardial Other, Pericardial Fluid  Gram Stain (30 May 2024 02:51):    polymorphonuclear leukocytes seen    No organisms seen    by cytocentrifuge  Preliminary Report (30 May 2024 17:44):    No growth    Culture - Blood (collected 28 May 2024 16:11)  Source: .Blood Blood  Gram Stain (30 May 2024 19:04):    Growth in anaerobic bottle: Gram Positive Cocci in Clusters  Preliminary Report (30 May 2024 19:04):    Growth in anaerobic bottle: Gram Positive Cocci in Clusters    Direct identification is available within approximately 3-5    hours either by Blood Panel Multiplexed PCR or Direct    MALDI-TOF. Details: [email or web address]  Organism: Blood Culture PCR (30 May 2024 20:32)  Organism: Blood Culture PCR (30 May 2024 20:32)    Culture - Blood (collected 28 May 2024 16:11)  Source: .Blood Blood  Preliminary Report (30 May 2024 23:01):    No growth at 48 Hours    Culture - Urine (collected 28 May 2024 15:29)  Source: Clean Catch Clean Catch (Midstream)  Final Report (30 May 2024 09:29):    No growth                                                                                           MEDICATIONS  (STANDING):  ALPRAZolam 1 milliGRAM(s) Oral four times a day  budesonide  80 MICROgram(s)/formoterol 4.5 MICROgram(s) Inhaler 2 Puff(s) Inhalation two times a day  cholecalciferol 2000 Unit(s) Oral daily  colchicine 0.6 milliGRAM(s) Oral two times a day  folic acid 1 milliGRAM(s) Oral daily  heparin  Infusion. 1000 Unit(s)/Hr (10 mL/Hr) IV Continuous <Continuous>  methylPREDNISolone sodium succinate Injectable 40 milliGRAM(s) IV Push once  morphine ER Tablet 60 milliGRAM(s) Oral <User Schedule>  pantoprazole    Tablet 40 milliGRAM(s) Oral before breakfast  piperacillin/tazobactam IVPB.. 3.375 Gram(s) IV Intermittent every 8 hours  sodium chloride 3%  Inhalation 4 milliLiter(s) Inhalation every 12 hours  tiotropium 2.5 MICROgram(s) Inhaler 2 Puff(s) Inhalation daily  vancomycin  IVPB 1250 milliGRAM(s) IV Intermittent every 24 hours    MEDICATIONS  (PRN):  acetaminophen     Tablet .. 650 milliGRAM(s) Oral every 6 hours PRN Temp greater or equal to 38C (100.4F), Severe Pain (7 - 10)  albuterol    90 MICROgram(s) HFA Inhaler 2 Puff(s) Inhalation every 6 hours PRN Shortness of Breath and/or Wheezing  aluminum hydroxide/magnesium hydroxide/simethicone Suspension 30 milliLiter(s) Oral every 4 hours PRN Dyspepsia  heparin   Injectable 3000 Unit(s) IV Push every 6 hours PRN For aPTT between 40 - 57  heparin   Injectable 6500 Unit(s) IV Push every 6 hours PRN For aPTT less than 40  melatonin 3 milliGRAM(s) Oral at bedtime PRN Insomnia  morphine  IR 15 milliGRAM(s) Oral two times a day PRN Severe Pain (7 - 10)  morphine ER Tablet 30 milliGRAM(s) Oral four times a day PRN severe pain      Xrays: Left basilar consolidation                                                                                      ECHO

## 2024-05-31 NOTE — PROGRESS NOTE ADULT - ASSESSMENT
#Large pericardial effusion, early tamponade physiology present based on bedside ECHO  #Left pleural effusion s/p thoracentesis  #Community acquired pneumonia  #Risk for sepsis  #HFpEF  #SLE/APLS/RA  #Hx DVT/PE on lovenox  #Hx subclavian stenosis  #Hx COPD on home O2  #Hx of torsades  #NSTEMI in 2015      PLAN:    CNS: Pain control per home meds.     HEENT: Oral care. Aspiration precautions     PULMONARY: HOB @ 45. Monitor Pulse Ox. Keep 88-92% on HFNC 60/60, Bipap at night and prn for desat. s/p left thoracentesis 650 cloudy fluid, f/u pleural fluid tests.     CARDIOVASCULAR: pleuritic chest pain, trop -ve*2, ekg with low voltage, no ischemic changes on ekg, no diffuse ST elevations to indicate pericarditis.  Large pericardial effusion. Early tamponade physiology based on bedside ECHO. C/w colchicine. IVC collapsable. Goal directed fluid resuscitation. S/p pericardial drain with 950 cc bloody output on 5/29. drain output 40cc since insertion, drain removed 5/30. Repeat TTE showing no reaccumulation of effusion.     GI: GI prophylaxis. Dash diet.     RENAL: Avoid nephrotoxic agents     INFECTIOUS DISEASE: still febrile. Abx broadened to vanc zosyn. repeat infectious work up sent.  Ua negative.  FU BCx. Ucx. GI PCR. f/u ID recs.     HEMATOLOGICAL: Hold home Lovenox, heparin ggt, PTT goal 60-70. f/u heme/onc: once stable change to home dose lovenox 110 Qday. \    Rheumatology: Solumedrol 60mg Qday, pending official rheumatology note. Etiology of fevers most likely autoimmune.     ENDOCRINE: Monitor FS. Insulin protocol if needed FS goal 140-180    MUSCULOSKELETAL: Ambulate as tolerated       # Misc  - DVT Prophylaxis: heparin  - GI Prophylaxis: pantoprazole  - Diet: DASH  - Dispo: CCU

## 2024-05-31 NOTE — CONSULT NOTE ADULT - ATTENDING COMMENTS
This is a 53-year-old female with extensive medical problems as above including antiphospholipid syndrome, extensive DVT history, and cerebral hemorrhage on Coumadin.  She is now admitted and is in respiratory failure on high flow oxygen with pericardial effusion as well as pleural effusion, status post pericardial window as well as thoracentesis respectfully.  She is currently on heparin drip her PTT is not prolonged at baseline so this is reasonable.  She is also on steroids for suspected lupus flare may be this is the cause of her pericardial and pleural effusions?,  Rheumatology evaluation pending.  Once stable can switch to her home Lovenox dose at 110 mg daily subcu dose as decided by her outpatient hematology team this is a difficult situation given high risk of bleeding and high risk of thrombosis.

## 2024-05-31 NOTE — CONSULT NOTE ADULT - SUBJECTIVE AND OBJECTIVE BOX
Hematology Consult Note    HPI:  Pt is a 54 y/o F PMHx  of HTN, HLD, COPD on home O2, SLE, lupus anticoagulant, antiphospholipid syndrome,  rheumatoid arthritis, cervical cancer sp hysterectomy,  laryngeal cancer s/p CT, RT, HFpEF (06/21/19 EF 63%) s/p AICD+PPM, DVT/PE (on lovenox QD),  NSTEMI in 2015, subclavian stenosis s/p stent placement presenting for shortness of breath and pleuritic chest pain. Pt reports on Wed she woke up with pleuritic chest pain and increased SOB. Pt with recent admission 1 week ago for Pneumonia, was DC from hospital on Sunday with oral Abx regimen. States she did not feel well at home, continued to have SOB, intermittent fevers, and increasing need for O2 so she came back to the hospital.    Vitals: /77  RR 20 T99F --> 100.9F Sat 96% on 2L NC    CT Angio Chest PE Protocol w/ IV Cont significant for increased size large pericardial effusion    Labs: WBC 15.7 proBNP 208 Trop 10 Lactate 0.8    Admitted to CCU       (28 May 2024 21:24)      Allergies    Vantin (Other (Mild))  Plaquenil (Other)  adhesives (Rash; Urticaria)  Avelox (Other)  Arava (Anaphylaxis; Angioedema)    Intolerances        MEDICATIONS  (STANDING):  albuterol/ipratropium for Nebulization 3 milliLiter(s) Nebulizer every 12 hours  ALPRAZolam 1 milliGRAM(s) Oral four times a day  budesonide  80 MICROgram(s)/formoterol 4.5 MICROgram(s) Inhaler 2 Puff(s) Inhalation two times a day  cholecalciferol 2000 Unit(s) Oral daily  colchicine 0.6 milliGRAM(s) Oral two times a day  folic acid 1 milliGRAM(s) Oral daily  heparin  Infusion. 1000 Unit(s)/Hr (10 mL/Hr) IV Continuous <Continuous>  morphine ER Tablet 60 milliGRAM(s) Oral <User Schedule>  pantoprazole    Tablet 40 milliGRAM(s) Oral before breakfast  piperacillin/tazobactam IVPB.. 3.375 Gram(s) IV Intermittent every 8 hours  sodium chloride 3%  Inhalation 4 milliLiter(s) Inhalation every 12 hours  tiotropium 2.5 MICROgram(s) Inhaler 2 Puff(s) Inhalation daily  vancomycin  IVPB 1250 milliGRAM(s) IV Intermittent every 24 hours    MEDICATIONS  (PRN):  acetaminophen     Tablet .. 650 milliGRAM(s) Oral every 6 hours PRN Temp greater or equal to 38C (100.4F), Severe Pain (7 - 10)  albuterol    90 MICROgram(s) HFA Inhaler 2 Puff(s) Inhalation every 6 hours PRN Shortness of Breath and/or Wheezing  aluminum hydroxide/magnesium hydroxide/simethicone Suspension 30 milliLiter(s) Oral every 4 hours PRN Dyspepsia  heparin   Injectable 6500 Unit(s) IV Push every 6 hours PRN For aPTT less than 40  heparin   Injectable 3000 Unit(s) IV Push every 6 hours PRN For aPTT between 40 - 57  melatonin 3 milliGRAM(s) Oral at bedtime PRN Insomnia  morphine  IR 15 milliGRAM(s) Oral two times a day PRN Severe Pain (7 - 10)  morphine ER Tablet 30 milliGRAM(s) Oral four times a day PRN severe pain      PAST MEDICAL & SURGICAL HISTORY:  Lupus      RA (rheumatoid arthritis)      HTN (hypertension)      CHF (congestive heart failure)      COPD (chronic obstructive pulmonary disease)      DVT (deep venous thrombosis)      Pulmonary embolism      History of laryngeal cancer      GERD (gastroesophageal reflux disease)      Cervical cancer      APS (antiphospholipid syndrome)      S/P appendectomy      S/P cholecystectomy      AICD (automatic cardioverter/defibrillator) present  [employer]      S/P hysterectomy      History of back surgery      H/O foot surgery      S/P eye surgery      Subclavian arterial stenosis          FAMILY HISTORY:  Family history of cervical cancer    FH: thyroid cancer        SOCIAL HISTORY: No EtOH, no tobacco    REVIEW OF SYSTEMS:    CONSTITUTIONAL: No weakness, fevers or chills  EYES/ENT: No visual changes;  No vertigo or throat pain   NECK: No pain or stiffness  RESPIRATORY: No cough, wheezing, hemoptysis; No shortness of breath  CARDIOVASCULAR: No chest pain or palpitations  GASTROINTESTINAL: No abdominal or epigastric pain. No nausea, vomiting, or hematemesis; No diarrhea or constipation. No melena or hematochezia.  GENITOURINARY: No dysuria, frequency or hematuria  NEUROLOGICAL: No numbness or weakness  SKIN: No itching, burning, rashes, or lesions   All other review of systems is negative unless indicated above.        T(F): 98.3 (05-31-24 @ 08:00), Max: 101.9 (05-30-24 @ 13:00)  HR: 102 (05-31-24 @ 10:00)  BP: 114/59 (05-31-24 @ 10:00)  RR: 28 (05-31-24 @ 10:00)  SpO2: 94% (05-31-24 @ 10:00)  Wt(kg): --    GENERAL: NAD, well-developed  HEAD:  Atraumatic, Normocephalic  EYES: EOMI, PERRLA, conjunctiva and sclera clear  NECK: Supple, No JVD  CHEST/LUNG: Clear to auscultation bilaterally; No wheeze  HEART: Regular rate and rhythm; No murmurs, rubs, or gallops  ABDOMEN: Soft, Nontender, Nondistended; Bowel sounds present  EXTREMITIES:  2+ Peripheral Pulses, No clubbing, cyanosis, or edema  NEUROLOGY: non-focal  SKIN: No rashes or lesions                          9.5    10.27 )-----------( 259      ( 31 May 2024 05:00 )             30.2       05-31    135  |  98  |  12  ----------------------------<  111<H>  3.8   |  27  |  1.0    Ca    8.4      31 May 2024 05:00  Phos  3.4     05-31  Mg     1.7     05-31    TPro  5.3<L>  /  Alb  3.0<L>  /  TBili  0.3  /  DBili  x   /  AST  10  /  ALT  20  /  AlkPhos  113  05-31      Phosphorus: 3.4 mg/dL (05-31 @ 05:00)  Magnesium: 1.7 mg/dL (05-31 @ 05:00)

## 2024-05-31 NOTE — CONSULT NOTE ADULT - ASSESSMENT
Pt is a 52 y/o F PMHx  of HTN, HLD, COPD on home O2, SLE, lupus anticoagulant, antiphospholipid syndrome,  rheumatoid arthritis, cervical cancer sp hysterectomy,  laryngeal cancer s/p CT, RT, HFpEF (06/21/19 EF 63%) s/p AICD+PPM, DVT/PE (on lovenox QD),  NSTEMI in 2015, subclavian stenosis s/p stent placement presenting for shortness of breath and pleuritic chest pain. Pt reports on Wed she woke up with pleuritic chest pain and increased SOB.      # Anti-phospholipid syndrome  # Extensive history of DVTs, recent in stent (subclavian vein) thrombosis  # subcutaneous hematomas and bruising due to supratherapeutic INR  # Cerebral hemorrhage from Coumdain    - In past she could not maintain therapeutic INR on warfarin, she clotted on DOAC, she developed subcutaneous lumps and bruising on Lovenox. The subcutanous painfule nodules after fondaparinux were biopsied and they showed ulcerated, hemorrhagic and inflamed subepidermal blister, purpura and perivascular infiltrate of lymphocytes , findings can be seen in a traumatized angiokeratoma or hemangioma and have been reported with fondaparinux.  - She was later admitted in [facility] and diagnosed with in stent thrombosis of subclavian vein and was switched to Lovenox again from Coumadin. She has again developed diffuse ecchymosis at injection sites and tender nodules on her legs. and then switched back to ASA and Coumdain.   - She was on ASA and Coumadin and she developed cerebral hematoma in July 2023 and was admitted in [place]. Her AC was switched to SQ Lovenox 110 mg Daily and she has been doing fine since then  - currently she is maintained on heparin gtt    # Pericardial Effusion complicated by Tamponade s/p pericardial window    - secondary to underlying SLE vs Coxackie virus infection?      # Cervical cancer S/P hysterectomy, laryngeal cancer S/P CT and RT  - In remission    Recommendations:      Patient is known to our service. She is co-managed by Dr Sultana and Dr. Jose Angel Chao at [facility]. She was on ASA and Coumdain previously,  After the cerebral event in July 2023, her AC was switched by her hematologist at [place] to 110 mg daily which is an unusual dose since she weighs close to 80 kg (due to risk of cerebral hemorrhage?)    It is a Catch-22 situation, Given her history of triple positive APLS and recurrent VTEs she remains at high risk for thrombosis. However, her history of cerebral hematoma puts at a risk of hemorrhage We would recommend to continue with the same dose of lovenox 110 mg SQ daily as recommended by her primary hematologist at [facility]. Obtain records to confirm it.    Also this should be decided after shared-decision making with the patient knowing the risks and benefits of AC.    For any questions call [phone] or text via [employer] teams

## 2024-05-31 NOTE — CONSULT NOTE ADULT - ASSESSMENT
ASSESSMENT  54 y/o F PMHx  of HTN, HLD, COPD on home O2, SLE, lupus anticoagulant, antiphospholipid syndrome,  rheumatoid arthritis, cervical cancer sp hysterectomy,  laryngeal cancer s/p CT, RT, HFpEF (06/21/19 EF 63%) s/p AICD+PPM, DVT/PE (on lovenox QD),  NSTEMI in 2015, subclavian stenosis s/p stent placement presenting for shortness of breath and pleuritic chest pain. Pt reports on Wed she woke up with pleuritic chest pain and increased SOB. Pt with recent admission 1 week ago for Pneumonia, was DC from hospital on Sunday with oral Abx regimen.  ID consulted for fevers    ABX  ampicillin/sulbactam  IVPB 5/28-29  doxycycline IVPB 5/38-29  piperacillin/tazobactam IVPB.. 5/30-present  vancomycin  IVPB 5/30-present    IMPRESSION  #Fever + Pericarditis & Pleural effusions, rule out infectious etiology   Tm 5/28 100.9, 5/29 101.5, 5/30 103  WBC peak 5/29 17--> 10     5/30 RVP (-)    Procalcitonin: 0.25 ng/mL (05-30-24 @ 15:53) unremarkable     s/p thoracentesis 5/30 WBC 1798; 51% NP, 39% lymph. CX NG     s/p pericardiocentesis 5/29 pericardial fluid NG ; WBC 2654; 51% NP    Previous admission coxsackie B titers pan-positive     MRSA PCR Result.: Negative (05-20-24 @ 23:15)    Legionella Antigen, Urine: Negative (05-20-24 @ 16:53)    Streptococcus pneumoniae Ag, Ur Result: Negative (05-20-24 @ 16:53)    Procalcitonin: 0.06 (05-20-24 @ 06:31)  < from: CT Chest No Cont (05.30.24 @ 12:05) >  1.  Since May 28, 2024, interval placement of a pericardial catheter with decreased size of the now moderate pericardial effusion.  2.  Increased moderate left pleural effusion with subtotal left lower lobe and partial left upper lobe compressive atelectasis. Superimposed   pneumonia cannot be excluded.  3.  Increased small right pleural effusion and associated right lower lobe consolidation when which may reflect atelectasis and/or pneumonia.  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      RECOMMENDATIONS  - This is a preliminary incomplete pended note, all final recommendations to follow after interview and examination of the patient.     If any questions, please send a message or call on [employer] Teams  Please continue to update ID with any pertinent new laboratory, radiographic findings, or change in clinical status ASSESSMENT  54 y/o F PMHx  of HTN, HLD, COPD on home O2, SLE, lupus anticoagulant, antiphospholipid syndrome,  rheumatoid arthritis, cervical cancer sp hysterectomy,  laryngeal cancer s/p CT, RT, HFpEF (06/21/19 EF 63%) s/p AICD+PPM, DVT/PE (on lovenox QD),  NSTEMI in 2015, subclavian stenosis s/p stent placement presenting for shortness of breath and pleuritic chest pain. Pt reports on Wed she woke up with pleuritic chest pain and increased SOB. Pt with recent admission 1 week ago for Pneumonia, was DC from hospital on Sunday with oral Abx regimen.  ID consulted for fevers    ABX  ampicillin/sulbactam  IVPB 5/28-29  doxycycline IVPB 5/28-29  piperacillin/tazobactam IVPB.. 5/30-present  vancomycin  IVPB 5/30-present    IMPRESSION  #Fever + Pericarditis & Pleural effusions, rule out infectious etiology   Tm 5/28 100.9, 5/29 101.5, 5/30 103  WBC peak 5/29 17--> 10     5/30 RVP (-)    Procalcitonin: 0.25 ng/mL (05-30-24 @ 15:53) unremarkable     s/p thoracentesis 5/30 WBC 1798; 51% NP, 39% lymph. CX NG     s/p pericardiocentesis 5/29 pericardial fluid NG ; WBC 2654; 51% NP    Previous admission coxsackie B titers pan-positive     MRSA PCR Result.: Negative (05-20-24 @ 23:15)    Legionella Antigen, Urine: Negative (05-20-24 @ 16:53)    Streptococcus pneumoniae Ag, Ur Result: Negative (05-20-24 @ 16:53)    Parvovirus negative     Coxsackie B titers , pan +     TSH wnl     Procalcitonin: 0.06 (05-20-24 @ 06:31)  < from: CT Chest No Cont (05.30.24 @ 12:05) >  1.  Since May 28, 2024, interval placement of a pericardial catheter with decreased size of the now moderate pericardial effusion.  2.  Increased moderate left pleural effusion with subtotal left lower lobe and partial left upper lobe compressive atelectasis. Superimposed   pneumonia cannot be excluded.  3.  Increased small right pleural effusion and associated right lower lobe consolidation when which may reflect atelectasis and/or pneumonia.  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      RECOMMENDATIONS  - Doubt bacterial etiology. Prolonged symptoms , all bacterial cx NG. Possible viral, rule out autoimmune  - Can complete empiric 7 days of antibiotics , on zosyn end 6/3  - f/u repeat coxsackie titers (may be too early to really be helpful), previous admission coxsackie B titers were pan-positive, unclear picture  - Send adenovirus PCR, CMV IgM/IgG, histoplasma serum Ab, Toxo IgM/IgG, quantiferon gold   - Rheum consult  - repeat procalcitonin     If any questions, please send a message or call on [employer] Teams  Please continue to update ID with any pertinent new laboratory, radiographic findings, or change in clinical status

## 2024-05-31 NOTE — CONSULT NOTE ADULT - CONSULT REASON
Triple positive APLS
Pleural effusion and atelectasis
fever
please assess, patient is on an extreme home regiemn: 60 MS contin qd, 30 MS contin q4hr PRN AND short acting 15 q4 prn. Please assess and recomend alternate or if this is appropriate dosage

## 2024-05-31 NOTE — CONSULT NOTE ADULT - REASON FOR ADMISSION
Large pericardial effusion

## 2024-06-01 LAB
A1C WITH ESTIMATED AVERAGE GLUCOSE RESULT: 5.8 % — HIGH (ref 4–5.6)
ALBUMIN SERPL ELPH-MCNC: 3.1 G/DL — LOW (ref 3.5–5.2)
ALP SERPL-CCNC: 116 U/L — HIGH (ref 30–115)
ALT FLD-CCNC: 22 U/L — SIGNIFICANT CHANGE UP (ref 0–41)
ANION GAP SERPL CALC-SCNC: 12 MMOL/L — SIGNIFICANT CHANGE UP (ref 7–14)
APTT BLD: 73 SEC — CRITICAL HIGH (ref 27–39.2)
AST SERPL-CCNC: 13 U/L — SIGNIFICANT CHANGE UP (ref 0–41)
BASOPHILS # BLD AUTO: 0.01 K/UL — SIGNIFICANT CHANGE UP (ref 0–0.2)
BASOPHILS NFR BLD AUTO: 0.1 % — SIGNIFICANT CHANGE UP (ref 0–1)
BILIRUB SERPL-MCNC: <0.2 MG/DL — SIGNIFICANT CHANGE UP (ref 0.2–1.2)
BUN SERPL-MCNC: 15 MG/DL — SIGNIFICANT CHANGE UP (ref 10–20)
CALCIUM SERPL-MCNC: 9.2 MG/DL — SIGNIFICANT CHANGE UP (ref 8.4–10.4)
CHLORIDE SERPL-SCNC: 101 MMOL/L — SIGNIFICANT CHANGE UP (ref 98–110)
CMV IGG FLD QL: 3.8 U/ML — HIGH
CMV IGG SERPL-IMP: POSITIVE
CMV IGM FLD-ACNC: <8 AU/ML — SIGNIFICANT CHANGE UP
CMV IGM SERPL QL: NEGATIVE — SIGNIFICANT CHANGE UP
CO2 SERPL-SCNC: 25 MMOL/L — SIGNIFICANT CHANGE UP (ref 17–32)
CREAT SERPL-MCNC: 0.9 MG/DL — SIGNIFICANT CHANGE UP (ref 0.7–1.5)
EGFR: 76 ML/MIN/1.73M2 — SIGNIFICANT CHANGE UP
EOSINOPHIL # BLD AUTO: 0 K/UL — SIGNIFICANT CHANGE UP (ref 0–0.7)
EOSINOPHIL NFR BLD AUTO: 0 % — SIGNIFICANT CHANGE UP (ref 0–8)
ESTIMATED AVERAGE GLUCOSE: 120 MG/DL — HIGH (ref 68–114)
GLUCOSE BLDC GLUCOMTR-MCNC: 205 MG/DL — HIGH (ref 70–99)
GLUCOSE SERPL-MCNC: 196 MG/DL — HIGH (ref 70–99)
HCT VFR BLD CALC: 29.5 % — LOW (ref 37–47)
HGB BLD-MCNC: 9.7 G/DL — LOW (ref 12–16)
IMM GRANULOCYTES NFR BLD AUTO: 0.9 % — HIGH (ref 0.1–0.3)
LYMPHOCYTES # BLD AUTO: 1.26 K/UL — SIGNIFICANT CHANGE UP (ref 1.2–3.4)
LYMPHOCYTES # BLD AUTO: 10.3 % — LOW (ref 20.5–51.1)
MAGNESIUM SERPL-MCNC: 2 MG/DL — SIGNIFICANT CHANGE UP (ref 1.8–2.4)
MCHC RBC-ENTMCNC: 29 PG — SIGNIFICANT CHANGE UP (ref 27–31)
MCHC RBC-ENTMCNC: 32.9 G/DL — SIGNIFICANT CHANGE UP (ref 32–37)
MCV RBC AUTO: 88.3 FL — SIGNIFICANT CHANGE UP (ref 81–99)
MONOCYTES # BLD AUTO: 0.61 K/UL — HIGH (ref 0.1–0.6)
MONOCYTES NFR BLD AUTO: 5 % — SIGNIFICANT CHANGE UP (ref 1.7–9.3)
NEUTROPHILS # BLD AUTO: 10.27 K/UL — HIGH (ref 1.4–6.5)
NEUTROPHILS NFR BLD AUTO: 83.7 % — HIGH (ref 42.2–75.2)
NRBC # BLD: 0 /100 WBCS — SIGNIFICANT CHANGE UP (ref 0–0)
PHOSPHATE SERPL-MCNC: 3.4 MG/DL — SIGNIFICANT CHANGE UP (ref 2.1–4.9)
PLATELET # BLD AUTO: 308 K/UL — SIGNIFICANT CHANGE UP (ref 130–400)
PMV BLD: 11.5 FL — HIGH (ref 7.4–10.4)
POTASSIUM SERPL-MCNC: 4.3 MMOL/L — SIGNIFICANT CHANGE UP (ref 3.5–5)
POTASSIUM SERPL-SCNC: 4.3 MMOL/L — SIGNIFICANT CHANGE UP (ref 3.5–5)
PROCALCITONIN SERPL-MCNC: 0.14 NG/ML — HIGH (ref 0.02–0.1)
PROT SERPL-MCNC: 5.7 G/DL — LOW (ref 6–8)
RBC # BLD: 3.34 M/UL — LOW (ref 4.2–5.4)
RBC # FLD: 13.1 % — SIGNIFICANT CHANGE UP (ref 11.5–14.5)
SODIUM SERPL-SCNC: 138 MMOL/L — SIGNIFICANT CHANGE UP (ref 135–146)
T GONDII IGG SER QL: <3 IU/ML — SIGNIFICANT CHANGE UP
T GONDII IGG SER QL: NEGATIVE — SIGNIFICANT CHANGE UP
T GONDII IGM SER QL: <3 AU/ML — SIGNIFICANT CHANGE UP
T GONDII IGM SER QL: NEGATIVE — SIGNIFICANT CHANGE UP
VANCOMYCIN TROUGH SERPL-MCNC: 14.9 UG/ML — HIGH (ref 5–10)
WBC # BLD: 12.26 K/UL — HIGH (ref 4.8–10.8)
WBC # FLD AUTO: 12.26 K/UL — HIGH (ref 4.8–10.8)

## 2024-06-01 PROCEDURE — 71250 CT THORAX DX C-: CPT | Mod: 26

## 2024-06-01 PROCEDURE — 99291 CRITICAL CARE FIRST HOUR: CPT

## 2024-06-01 PROCEDURE — 71045 X-RAY EXAM CHEST 1 VIEW: CPT | Mod: 26

## 2024-06-01 PROCEDURE — 93010 ELECTROCARDIOGRAM REPORT: CPT

## 2024-06-01 RX ADMIN — MORPHINE SULFATE 60 MILLIGRAM(S): 50 CAPSULE, EXTENDED RELEASE ORAL at 09:23

## 2024-06-01 RX ADMIN — MORPHINE SULFATE 30 MILLIGRAM(S): 50 CAPSULE, EXTENDED RELEASE ORAL at 19:26

## 2024-06-01 RX ADMIN — SODIUM CHLORIDE 4 MILLILITER(S): 9 INJECTION INTRAMUSCULAR; INTRAVENOUS; SUBCUTANEOUS at 09:06

## 2024-06-01 RX ADMIN — PIPERACILLIN AND TAZOBACTAM 25 GRAM(S): 4; .5 INJECTION, POWDER, LYOPHILIZED, FOR SOLUTION INTRAVENOUS at 05:11

## 2024-06-01 RX ADMIN — TIOTROPIUM BROMIDE 2 PUFF(S): 18 CAPSULE ORAL; RESPIRATORY (INHALATION) at 08:23

## 2024-06-01 RX ADMIN — MORPHINE SULFATE 15 MILLIGRAM(S): 50 CAPSULE, EXTENDED RELEASE ORAL at 17:06

## 2024-06-01 RX ADMIN — BUDESONIDE AND FORMOTEROL FUMARATE DIHYDRATE 2 PUFF(S): 160; 4.5 AEROSOL RESPIRATORY (INHALATION) at 08:22

## 2024-06-01 RX ADMIN — Medication 60 MILLIGRAM(S): at 05:11

## 2024-06-01 RX ADMIN — Medication 3 MILLILITER(S): at 19:55

## 2024-06-01 RX ADMIN — Medication 1 MILLIGRAM(S): at 21:27

## 2024-06-01 RX ADMIN — Medication 166.67 MILLIGRAM(S): at 08:23

## 2024-06-01 RX ADMIN — Medication 1 MILLIGRAM(S): at 11:54

## 2024-06-01 RX ADMIN — Medication 2: at 08:24

## 2024-06-01 RX ADMIN — HEPARIN SODIUM 1000 UNIT(S)/HR: 5000 INJECTION INTRAVENOUS; SUBCUTANEOUS at 06:00

## 2024-06-01 RX ADMIN — Medication 0.6 MILLIGRAM(S): at 17:24

## 2024-06-01 RX ADMIN — PIPERACILLIN AND TAZOBACTAM 25 GRAM(S): 4; .5 INJECTION, POWDER, LYOPHILIZED, FOR SOLUTION INTRAVENOUS at 14:40

## 2024-06-01 RX ADMIN — Medication 2: at 16:58

## 2024-06-01 RX ADMIN — Medication 0.6 MILLIGRAM(S): at 05:28

## 2024-06-01 RX ADMIN — MORPHINE SULFATE 15 MILLIGRAM(S): 50 CAPSULE, EXTENDED RELEASE ORAL at 13:09

## 2024-06-01 RX ADMIN — Medication 1 MILLIGRAM(S): at 17:07

## 2024-06-01 RX ADMIN — MORPHINE SULFATE 15 MILLIGRAM(S): 50 CAPSULE, EXTENDED RELEASE ORAL at 17:08

## 2024-06-01 RX ADMIN — MORPHINE SULFATE 15 MILLIGRAM(S): 50 CAPSULE, EXTENDED RELEASE ORAL at 14:05

## 2024-06-01 RX ADMIN — Medication 3 MILLILITER(S): at 09:06

## 2024-06-01 RX ADMIN — Medication 1 MILLIGRAM(S): at 05:10

## 2024-06-01 RX ADMIN — MORPHINE SULFATE 60 MILLIGRAM(S): 50 CAPSULE, EXTENDED RELEASE ORAL at 08:22

## 2024-06-01 RX ADMIN — Medication 1: at 11:54

## 2024-06-01 RX ADMIN — CHLORHEXIDINE GLUCONATE 1 APPLICATION(S): 213 SOLUTION TOPICAL at 05:57

## 2024-06-01 RX ADMIN — PIPERACILLIN AND TAZOBACTAM 25 GRAM(S): 4; .5 INJECTION, POWDER, LYOPHILIZED, FOR SOLUTION INTRAVENOUS at 21:27

## 2024-06-01 RX ADMIN — MORPHINE SULFATE 30 MILLIGRAM(S): 50 CAPSULE, EXTENDED RELEASE ORAL at 05:29

## 2024-06-01 RX ADMIN — PANTOPRAZOLE SODIUM 40 MILLIGRAM(S): 20 TABLET, DELAYED RELEASE ORAL at 05:11

## 2024-06-01 RX ADMIN — Medication 2000 UNIT(S): at 11:54

## 2024-06-01 NOTE — PROGRESS NOTE ADULT - ATTENDING COMMENTS
I, Carrillo Jessica MD, have personally seen and examined the patient.  I fully participated in the care of this patient.  I have made amendments to the documentation where necessary, and agree with the history, physical exam, and plan as documented by the Resident/Fellow/DEYANIRA.

## 2024-06-01 NOTE — PROGRESS NOTE ADULT - ASSESSMENT
52 y/o female PMH HFpEF, SLE, antiphospholipid syndrome, RA, HTN, HLD, COPD who presents with dyspnea and pleuritic chest pain. 1 week prior patient admitted for PNA and small pericardial effusion. Patient found to have enlarging effusion with tamponade physiology and underwent successful pericardiocentesis on 5/29/24.        Plan  -ID consult for PNA and completing empiric treatment for 7 days, EOD 6/3  -Rheumatology consult for SLE exacerbation   52 y/o female PMH HFpEF, SLE, antiphospholipid syndrome, RA, HTN, HLD, COPD who presents with dyspnea and pleuritic chest pain. 1 week prior patient admitted for PNA and small pericardial effusion. Patient found to have enlarging effusion with tamponade physiology and underwent successful pericardiocentesis on 5/29/24.      Thoracentesis on Thursday  Plan  -CT chest   -ID consult for PNA and completing empiric treatment for 7 days, EOD 6/3  -Rheumatology consult for SLE exacerbation    Carrillo Jessica MD  Interventional Cardiology/Advanced Heart Failure Transplant   54 y/o female PMH HFpEF, SLE, antiphospholipid syndrome, RA, HTN, HLD, COPD who presents with dyspnea and pleuritic chest pain. 1 week prior patient admitted for PNA and small pericardial effusion. Patient found to have enlarging effusion with tamponade physiology and underwent successful pericardiocentesis on 5/29/24. Pericardial effusion was likely 2/2 SLE. She continues to have acute hypoxic respiratory failure which is the major issue at this time along with recurrent pleural effusion despite thoracentesis.     Labs reviewed with stable anemia H/H ~9-10. Slightly elevated leukocytosis likely 2/2 steroids. Otherwise unremarkable. Echo reviewed with normal function and small pericardial effusion. Cultures have been negative but continue to be on empiric abx per ID.     On exam she is warm, well perfused, and euvolemic. HR and BP well controlled. Continuing on colchicine for pericarditis.     Plan  -Continue colchicine 0.6 mg BID for at least 3 months  -CT chest to evaluate effusion  -ID consult for PNA and completing empiric treatment for 7 days, EOD 6/3  -Rheumatology consult for SLE exacerbation    Carrillo Jessica MD  Interventional Cardiology/Advanced Heart Failure Transplant

## 2024-06-01 NOTE — PROGRESS NOTE ADULT - ASSESSMENT
#Large pericardial effusion, early tamponade physiology present based on bedside ECHO  #Left pleural effusion s/p thoracentesis  #Community acquired pneumonia  #Risk for sepsis  #HFpEF  #SLE/APLS/RA  #Hx DVT/PE on lovenox  #Hx subclavian stenosis  #Hx COPD on home O2  #Hx of torsades  #NSTEMI in 2015      PLAN:    CNS: Pain control per home meds.     HEENT: Oral care. Aspiration precautions     PULMONARY: HOB @ 45. Monitor Pulse Ox. Keep 88-92% on HFNC 60/60, Bipap at night and prn for desat. s/p left thoracentesis 650 cloudy fluid, f/u pleural fluid tests.  - 6/1 ID consult for PNA and completing empiric treatment for 7 days, EOD 6/3    CARDIOVASCULAR: pleuritic chest pain, trop -ve*2, ekg with low voltage, no ischemic changes on ekg, no diffuse ST elevations to indicate pericarditis.  Large pericardial effusion. Early tamponade physiology based on bedside ECHO. C/w colchicine. IVC collapsable. Goal directed fluid resuscitation. S/p pericardial drain with 950 cc bloody output on 5/29. drain output 40cc since insertion, drain removed 5/30. Repeat TTE showing no reaccumulation of effusion.   -6/1 Continue colchicine 0.6 mg BID for at least 3 months. CT chest to evaluate effusion.    GI: GI prophylaxis. Dash diet.     RENAL: Avoid nephrotoxic agents     INFECTIOUS DISEASE: still febrile. Abx broadened to vanc zosyn. repeat infectious work up sent.  Ua negative.  FU BCx. Ucx. GI PCR. f/u ID recs.     HEMATOLOGICAL: Hold home Lovenox, heparin ggt, PTT goal 60-70. f/u heme/onc: once stable change to home dose lovenox 110 Qday.     Rheumatology: Solumedrol 60mg Qday, pending official rheumatology note. Etiology of fevers most likely autoimmune.   -6/1 Rheumatology consult for SLE exacerbation    ENDOCRINE: Monitor FS. Insulin protocol if needed FS goal 140-180    MUSCULOSKELETAL: Ambulate as tolerated       # Misc  - DVT Prophylaxis: heparin  - GI Prophylaxis: pantoprazole  - Diet: DASH  - Dispo: CCU

## 2024-06-01 NOTE — PROGRESS NOTE ADULT - SUBJECTIVE AND OBJECTIVE BOX
CCU Attending Note    ILEANA MCNEIL  MRN-[id number]    Date of Admission: 05-28-24    Brief HPI: 52 y/o female PMH HFpEF, SLE, antiphospholipid syndrome, RA, HTN, HLD, COPD who presents with pericardial effusion and cardiac tamponade. Course complicated by SLE exacerbation and PNA.     24 hr events:   NAEON    acetaminophen     Tablet .. 650 milliGRAM(s) Oral every 6 hours PRN  albuterol    90 MICROgram(s) HFA Inhaler 2 Puff(s) Inhalation every 6 hours PRN  albuterol/ipratropium for Nebulization 3 milliLiter(s) Nebulizer every 12 hours  ALPRAZolam 1 milliGRAM(s) Oral four times a day  aluminum hydroxide/magnesium hydroxide/simethicone Suspension 30 milliLiter(s) Oral every 4 hours PRN  budesonide  80 MICROgram(s)/formoterol 4.5 MICROgram(s) Inhaler 2 Puff(s) Inhalation two times a day  chlorhexidine 2% Cloths 1 Application(s) Topical <User Schedule>  cholecalciferol 2000 Unit(s) Oral daily  colchicine 0.6 milliGRAM(s) Oral two times a day  dextrose 10% Bolus 125 milliLiter(s) IV Bolus once  dextrose 5%. 1000 milliLiter(s) IV Continuous <Continuous>  dextrose 5%. 1000 milliLiter(s) IV Continuous <Continuous>  dextrose 50% Injectable 25 Gram(s) IV Push once  dextrose 50% Injectable 12.5 Gram(s) IV Push once  dextrose Oral Gel 15 Gram(s) Oral once PRN  folic acid 1 milliGRAM(s) Oral daily  glucagon  Injectable 1 milliGRAM(s) IntraMuscular once  heparin   Injectable 6500 Unit(s) IV Push every 6 hours PRN  heparin   Injectable 3000 Unit(s) IV Push every 6 hours PRN  heparin  Infusion. 1000 Unit(s)/Hr IV Continuous <Continuous>  insulin lispro (ADMELOG) corrective regimen sliding scale   SubCutaneous three times a day before meals  melatonin 3 milliGRAM(s) Oral at bedtime PRN  methylPREDNISolone sodium succinate Injectable 60 milliGRAM(s) IV Push daily  morphine  IR 15 milliGRAM(s) Oral two times a day PRN  morphine ER Tablet 60 milliGRAM(s) Oral <User Schedule>  morphine ER Tablet 30 milliGRAM(s) Oral four times a day PRN  pantoprazole    Tablet 40 milliGRAM(s) Oral before breakfast  piperacillin/tazobactam IVPB.. 3.375 Gram(s) IV Intermittent every 8 hours  sodium chloride 3%  Inhalation 4 milliLiter(s) Inhalation every 12 hours  tiotropium 2.5 MICROgram(s) Inhaler 2 Puff(s) Inhalation daily  vancomycin  IVPB 1250 milliGRAM(s) IV Intermittent every 24 hours      PHYSICAL EXAM:  T(C): 36.2 (06-01-24 @ 04:00), Max: 36.4 (05-31-24 @ 12:00)  T(F): 97.1 (06-01-24 @ 04:00), Max: 97.5 (05-31-24 @ 12:00)  HR: 69 (06-01-24 @ 07:00) (69 - 102)  BP: 109/55 (06-01-24 @ 07:00) (99/50 - 124/56)  RR: 17 (06-01-24 @ 07:00) (11 - 38)  SpO2: 95% (06-01-24 @ 07:00) (90% - 95%)      05-29-24 @ 07:01  -  05-30-24 @ 07:00  --------------------------------------------------------  IN: 3089 mL / OUT: 2855 mL / NET: 234 mL    05-30-24 @ 07:01  -  05-31-24 @ 07:00  --------------------------------------------------------  IN: 1589 mL / OUT: 3225 mL / NET: -1636 mL    05-31-24 @ 07:01  -  06-01-24 @ 07:00  --------------------------------------------------------  IN: 1600 mL / OUT: 2000 mL / NET: -400 mL      I&O's Detail    31 May 2024 07:01  -  01 Jun 2024 07:00  --------------------------------------------------------  IN:    Heparin Infusion: 240 mL    IV PiggyBack: 400 mL    Oral Fluid: 960 mL  Total IN: 1600 mL    OUT:    Voided (mL): 2000 mL  Total OUT: 2000 mL    Total NET: -400 mL          ABP: --  ABP(mean): --  CVP(mm Hg): --  CVP(cm H2O): --  CO: --  CI: --  PA: --  PA(mean): --  PA(direct): --  PCWP: --  LA: --  RA: --  SVR: --  SVRI: --  PVR: --  PVRI: --    General Appearance: no acute distress	  Cardiovascular: regular rate and rhythm S1 S2, No JVD, No murmurs  Respiratory: clear bilaterally  Gastrointestinal:  soft, non-tender  Skin/Integumen: no edema  Neurologic: non focal  Musculoskeletal/ extremities: warm  Vascular: Peripheral pulses palpable 2+ bilaterally    LABS:	 	                        9.7    12.26 )-----------( 308      ( 01 Jun 2024 04:43 )             29.5     06-01    138  |  101  |  15  ----------------------------<  196<H>  4.3   |  25  |  0.9    Ca    9.2      01 Jun 2024 04:43  Phos  3.4     06-01  Mg     2.0     06-01    TPro  5.7<L>  /  Alb  3.1<L>  /  TBili  <0.2  /  DBili  x   /  AST  13  /  ALT  22  /  AlkPhos  116<H>  06-01    PTT - ( 01 Jun 2024 04:43 )  PTT:73.0 sec          Culture - Fungal, Body Fluid (collected 30 May 2024 13:54)  Source: Pleural Fl Pleural Fluid  Preliminary Report (31 May 2024 07:25):    Testing in progress    Culture - Body Fluid with Gram Stain (collected 30 May 2024 13:54)  Source: Pleural Fl Pleural Fluid  Gram Stain (31 May 2024 03:18):    polymorphonuclear leukocytes seen    No organisms seen    by cytocentrifuge    Culture - Blood (collected 30 May 2024 02:13)  Source: .Blood Blood-Peripheral  Preliminary Report (31 May 2024 10:02):    No growth at 24 hours    Culture - Fungal, Body Fluid (collected 29 May 2024 17:00)  Source: .Body Fluid Other, Fluid  Preliminary Report (30 May 2024 09:28):    Testing in progress    Culture - Acid Fast - Body Fluid w/Smear (collected 29 May 2024 17:00)  Source: Pericardial Other, Pericardial Fluid    Culture - Body Fluid with Gram Stain (collected 29 May 2024 17:00)  Source: Pericardial Other, Pericardial Fluid  Gram Stain (30 May 2024 02:51):    polymorphonuclear leukocytes seen    No organisms seen    by cytocentrifuge  Preliminary Report (30 May 2024 17:44):    No growth        CARDIAC TESTING:  ECG:  	  Telemetry:  Echocardiogram: Normal LV function, small pericardial effusion compared to prior    OTHER DIAGNOSTIC TESTING:  CXR: Moderate L pleural effusion  CT:   CCU Attending Note    ILEANA MCNEIL  MRN-[id number]    Date of Admission: 05-28-24    Brief HPI: 52 y/o female PMH HFpEF, SLE, antiphospholipid syndrome, RA, HTN, HLD, COPD who presents with pericardial effusion and cardiac tamponade. Course complicated by SLE exacerbation and PNA.     24 hr events:   NAEON, feels a little worse compared to yesterday    acetaminophen     Tablet .. 650 milliGRAM(s) Oral every 6 hours PRN  albuterol    90 MICROgram(s) HFA Inhaler 2 Puff(s) Inhalation every 6 hours PRN  albuterol/ipratropium for Nebulization 3 milliLiter(s) Nebulizer every 12 hours  ALPRAZolam 1 milliGRAM(s) Oral four times a day  aluminum hydroxide/magnesium hydroxide/simethicone Suspension 30 milliLiter(s) Oral every 4 hours PRN  budesonide  80 MICROgram(s)/formoterol 4.5 MICROgram(s) Inhaler 2 Puff(s) Inhalation two times a day  chlorhexidine 2% Cloths 1 Application(s) Topical <User Schedule>  cholecalciferol 2000 Unit(s) Oral daily  colchicine 0.6 milliGRAM(s) Oral two times a day  dextrose 10% Bolus 125 milliLiter(s) IV Bolus once  dextrose 5%. 1000 milliLiter(s) IV Continuous <Continuous>  dextrose 5%. 1000 milliLiter(s) IV Continuous <Continuous>  dextrose 50% Injectable 25 Gram(s) IV Push once  dextrose 50% Injectable 12.5 Gram(s) IV Push once  dextrose Oral Gel 15 Gram(s) Oral once PRN  folic acid 1 milliGRAM(s) Oral daily  glucagon  Injectable 1 milliGRAM(s) IntraMuscular once  heparin   Injectable 6500 Unit(s) IV Push every 6 hours PRN  heparin   Injectable 3000 Unit(s) IV Push every 6 hours PRN  heparin  Infusion. 1000 Unit(s)/Hr IV Continuous <Continuous>  insulin lispro (ADMELOG) corrective regimen sliding scale   SubCutaneous three times a day before meals  melatonin 3 milliGRAM(s) Oral at bedtime PRN  methylPREDNISolone sodium succinate Injectable 60 milliGRAM(s) IV Push daily  morphine  IR 15 milliGRAM(s) Oral two times a day PRN  morphine ER Tablet 60 milliGRAM(s) Oral <User Schedule>  morphine ER Tablet 30 milliGRAM(s) Oral four times a day PRN  pantoprazole    Tablet 40 milliGRAM(s) Oral before breakfast  piperacillin/tazobactam IVPB.. 3.375 Gram(s) IV Intermittent every 8 hours  sodium chloride 3%  Inhalation 4 milliLiter(s) Inhalation every 12 hours  tiotropium 2.5 MICROgram(s) Inhaler 2 Puff(s) Inhalation daily  vancomycin  IVPB 1250 milliGRAM(s) IV Intermittent every 24 hours      PHYSICAL EXAM:  T(C): 36.2 (06-01-24 @ 04:00), Max: 36.4 (05-31-24 @ 12:00)  T(F): 97.1 (06-01-24 @ 04:00), Max: 97.5 (05-31-24 @ 12:00)  HR: 69 (06-01-24 @ 07:00) (69 - 102)  BP: 109/55 (06-01-24 @ 07:00) (99/50 - 124/56)  RR: 17 (06-01-24 @ 07:00) (11 - 38)  SpO2: 95% (06-01-24 @ 07:00) (90% - 95%)      05-29-24 @ 07:01  -  05-30-24 @ 07:00  --------------------------------------------------------  IN: 3089 mL / OUT: 2855 mL / NET: 234 mL    05-30-24 @ 07:01  -  05-31-24 @ 07:00  --------------------------------------------------------  IN: 1589 mL / OUT: 3225 mL / NET: -1636 mL    05-31-24 @ 07:01  -  06-01-24 @ 07:00  --------------------------------------------------------  IN: 1600 mL / OUT: 2000 mL / NET: -400 mL      I&O's Detail    31 May 2024 07:01  -  01 Jun 2024 07:00  --------------------------------------------------------  IN:    Heparin Infusion: 240 mL    IV PiggyBack: 400 mL    Oral Fluid: 960 mL  Total IN: 1600 mL    OUT:    Voided (mL): 2000 mL  Total OUT: 2000 mL    Total NET: -400 mL          ABP: --  ABP(mean): --  CVP(mm Hg): --  CVP(cm H2O): --  CO: --  CI: --  PA: --  PA(mean): --  PA(direct): --  PCWP: --  LA: --  RA: --  SVR: --  SVRI: --  PVR: --  PVRI: --    General Appearance: no acute distress	  Cardiovascular: regular rate and rhythm. No JVP  Respiratory: bilateral crackles  Gastrointestinal:  soft, non-tender  Skin/Integumen: no edema  Neurologic: non focal  Musculoskeletal/ extremities: warm  Vascular: Peripheral pulses palpable 2+ bilaterally    LABS:	 	                        9.7    12.26 )-----------( 308      ( 01 Jun 2024 04:43 )             29.5     06-01    138  |  101  |  15  ----------------------------<  196<H>  4.3   |  25  |  0.9    Ca    9.2      01 Jun 2024 04:43  Phos  3.4     06-01  Mg     2.0     06-01    TPro  5.7<L>  /  Alb  3.1<L>  /  TBili  <0.2  /  DBili  x   /  AST  13  /  ALT  22  /  AlkPhos  116<H>  06-01    PTT - ( 01 Jun 2024 04:43 )  PTT:73.0 sec          Culture - Fungal, Body Fluid (collected 30 May 2024 13:54)  Source: Pleural Fl Pleural Fluid  Preliminary Report (31 May 2024 07:25):    Testing in progress    Culture - Body Fluid with Gram Stain (collected 30 May 2024 13:54)  Source: Pleural Fl Pleural Fluid  Gram Stain (31 May 2024 03:18):    polymorphonuclear leukocytes seen    No organisms seen    by cytocentrifuge    Culture - Blood (collected 30 May 2024 02:13)  Source: .Blood Blood-Peripheral  Preliminary Report (31 May 2024 10:02):    No growth at 24 hours    Culture - Fungal, Body Fluid (collected 29 May 2024 17:00)  Source: .Body Fluid Other, Fluid  Preliminary Report (30 May 2024 09:28):    Testing in progress    Culture - Acid Fast - Body Fluid w/Smear (collected 29 May 2024 17:00)  Source: Pericardial Other, Pericardial Fluid    Culture - Body Fluid with Gram Stain (collected 29 May 2024 17:00)  Source: Pericardial Other, Pericardial Fluid  Gram Stain (30 May 2024 02:51):    polymorphonuclear leukocytes seen    No organisms seen    by cytocentrifuge  Preliminary Report (30 May 2024 17:44):    No growth        CARDIAC TESTING:  ECG:  	  Telemetry:  Echocardiogram: Normal LV function, small pericardial effusion compared to prior    OTHER DIAGNOSTIC TESTING:  CXR: Moderate L pleural effusion  CT:

## 2024-06-01 NOTE — PROGRESS NOTE ADULT - SUBJECTIVE AND OBJECTIVE BOX
CHIEF COMPLAINT:  Patient is a 53y old  Female who presents with a chief complaint of Large pericardial effusion (01 Jun 2024 08:27)      INTERVAL HISTORY/OVERNIGHT EVENTS:  No overnight events.    ======================  MEDICATIONS:  albuterol/ipratropium for Nebulization 3 milliLiter(s) Nebulizer every 12 hours  ALPRAZolam 1 milliGRAM(s) Oral four times a day  budesonide  80 MICROgram(s)/formoterol 4.5 MICROgram(s) Inhaler 2 Puff(s) Inhalation two times a day  chlorhexidine 2% Cloths 1 Application(s) Topical <User Schedule>  cholecalciferol 2000 Unit(s) Oral daily  colchicine 0.6 milliGRAM(s) Oral two times a day  dextrose 10% Bolus 125 milliLiter(s) IV Bolus once  dextrose 50% Injectable 25 Gram(s) IV Push once  dextrose 50% Injectable 12.5 Gram(s) IV Push once  folic acid 1 milliGRAM(s) Oral daily  glucagon  Injectable 1 milliGRAM(s) IntraMuscular once  insulin lispro (ADMELOG) corrective regimen sliding scale   SubCutaneous three times a day before meals  methylPREDNISolone sodium succinate Injectable 60 milliGRAM(s) IV Push daily  morphine ER Tablet 60 milliGRAM(s) Oral <User Schedule>  pantoprazole    Tablet 40 milliGRAM(s) Oral before breakfast  piperacillin/tazobactam IVPB.. 3.375 Gram(s) IV Intermittent every 8 hours  sodium chloride 3%  Inhalation 4 milliLiter(s) Inhalation every 12 hours  tiotropium 2.5 MICROgram(s) Inhaler 2 Puff(s) Inhalation daily  vancomycin  IVPB 1250 milliGRAM(s) IV Intermittent every 24 hours    DRIPS:  dextrose 5%. 1000 milliLiter(s) (100 mL/Hr) IV Continuous <Continuous>  dextrose 5%. 1000 milliLiter(s) (50 mL/Hr) IV Continuous <Continuous>  heparin  Infusion. 1000 Unit(s)/Hr (10 mL/Hr) IV Continuous <Continuous>    PRN:       ======================  PHYSICAL EXAMINATION:  GEN: moderate chest pain, on non-rebreather   LUNGS: Clear to auscultation bilaterally   HEART: S1/S2 no added sounds.    ABD: Soft, non-tender, non-distended.   EXT: No pedal edema  NEURO: AAOX3    ======================  OBJECTIVE:        VS:  T(F): 96.6 (06-01 @ 16:00), Max: 97.1 (06-01 @ 04:00)  HR: 86 (06-01 @ 18:00) (69 - 104)  BP: 130/67 (06-01 @ 16:00) (99/50 - 134/63)  RR: 32 (06-01 @ 18:00) (17 - 47)  SpO2: 98% (06-01 @ 18:00) (90% - 98%)  CVP(mm Hg): --  CO: --  CI: --  PA: --  PCWP: --    I/O:      05-29 @ 07:01  -  05-30 @ 07:00  --------------------------------------------------------  IN: 3089 mL / OUT: 2855 mL / NET: 234 mL    05-30 @ 07:01  -  05-31 @ 07:00  --------------------------------------------------------  IN: 1589 mL / OUT: 3225 mL / NET: -1636 mL    05-31 @ 07:01  -  06-01 @ 07:00  --------------------------------------------------------  IN: 1600 mL / OUT: 2000 mL / NET: -400 mL    06-01 @ 07:01  -  06-01 @ 19:19  --------------------------------------------------------  IN: 980 mL / OUT: 0 mL / NET: 980 mL        Weight trend:  Weight (kg): 80.7 (05-29)    ======================    LABS:                          9.7    12.26 )-----------( 308      ( 01 Jun 2024 04:43 )             29.5     06-01    138  |  101  |  15  ----------------------------<  196<H>  4.3   |  25  |  0.9    Ca    9.2      01 Jun 2024 04:43  Phos  3.4     06-01  Mg     2.0     06-01    TPro  5.7<L>  /  Alb  3.1<L>  /  TBili  <0.2  /  DBili  x   /  AST  13  /  ALT  22  /  AlkPhos  116<H>  06-01    LIVER FUNCTIONS - ( 01 Jun 2024 04:43 )  Alb: 3.1 g/dL / Pro: 5.7 g/dL / ALK PHOS: 116 U/L / ALT: 22 U/L / AST: 13 U/L / GGT: x           PTT - ( 01 Jun 2024 04:43 )  PTT:73.0 sec          Urinalysis Basic - ( 01 Jun 2024 04:43 )    Color: x / Appearance: x / SG: x / pH: x  Gluc: 196 mg/dL / Ketone: x  / Bili: x / Urobili: x   Blood: x / Protein: x / Nitrite: x   Leuk Esterase: x / RBC: x / WBC x   Sq Epi: x / Non Sq Epi: x / Bacteria: x        Cultures:    Culture - Blood (collected 05-31)  Source: .Blood None  Preliminary Report:    No growth at 24 hours    Culture - Body Fluid with Gram Stain (collected 05-30)  Source: Pleural Fl Pleural Fluid  Gram Stain:    polymorphonuclear leukocytes seen    No organisms seen    by cytocentrifuge    Culture - Blood (collected 05-30)  Source: .Blood Blood-Peripheral  Preliminary Report:    No growth at 48 Hours    Culture - Body Fluid with Gram Stain (collected 05-29)  Source: Pericardial Other, Pericardial Fluid  Gram Stain:    polymorphonuclear leukocytes seen    No organisms seen    by cytocentrifuge  Preliminary Report:    No growth    Culture - Blood (collected 05-28)  Source: .Blood Blood  Gram Stain:    Growth in anaerobic bottle: Gram Positive Cocci in Clusters  Final Report:    Growth in anaerobic bottle: Staphylococcus hominis    Isolation of Coagulase negative Staphylococcus from single blood culture    sets may represent    contamination. Contact the Microbiology Department at [phone] if    susceptibility testing is    clinically indicated.    Direct identification is available within approximately 3-5    hours either by Blood Panel Multiplexed PCR or Direct    MALDI-TOF. Details: [email or web address]  Organism: Blood Culture PCR  Organism: Blood Culture PCR      Method Type: PCR      -  Coagulase negative Staphylococcus: Detec    Culture - Blood (collected 05-28)  Source: .Blood Blood  Preliminary Report:    No growth at 72 Hours    Culture - Urine (collected 05-28)  Source: Clean Catch Clean Catch (Midstream)  Final Report:    No growth

## 2024-06-02 LAB
ALBUMIN SERPL ELPH-MCNC: 3.2 G/DL — LOW (ref 3.5–5.2)
ALP SERPL-CCNC: 116 U/L — HIGH (ref 30–115)
ALT FLD-CCNC: 21 U/L — SIGNIFICANT CHANGE UP (ref 0–41)
ANION GAP SERPL CALC-SCNC: 12 MMOL/L — SIGNIFICANT CHANGE UP (ref 7–14)
APTT BLD: 105.6 SEC — CRITICAL HIGH (ref 27–39.2)
APTT BLD: 48.2 SEC — HIGH (ref 27–39.2)
APTT BLD: 90 SEC — CRITICAL HIGH (ref 27–39.2)
AST SERPL-CCNC: 16 U/L — SIGNIFICANT CHANGE UP (ref 0–41)
BASOPHILS # BLD AUTO: 0.02 K/UL — SIGNIFICANT CHANGE UP (ref 0–0.2)
BASOPHILS NFR BLD AUTO: 0.1 % — SIGNIFICANT CHANGE UP (ref 0–1)
BILIRUB SERPL-MCNC: <0.2 MG/DL — SIGNIFICANT CHANGE UP (ref 0.2–1.2)
BUN SERPL-MCNC: 22 MG/DL — HIGH (ref 10–20)
CALCIUM SERPL-MCNC: 8.9 MG/DL — SIGNIFICANT CHANGE UP (ref 8.4–10.4)
CHLORIDE SERPL-SCNC: 104 MMOL/L — SIGNIFICANT CHANGE UP (ref 98–110)
CO2 SERPL-SCNC: 23 MMOL/L — SIGNIFICANT CHANGE UP (ref 17–32)
CREAT SERPL-MCNC: 1.5 MG/DL — SIGNIFICANT CHANGE UP (ref 0.7–1.5)
CULTURE RESULTS: SIGNIFICANT CHANGE UP
EGFR: 41 ML/MIN/1.73M2 — LOW
EOSINOPHIL # BLD AUTO: 0.01 K/UL — SIGNIFICANT CHANGE UP (ref 0–0.7)
EOSINOPHIL NFR BLD AUTO: 0.1 % — SIGNIFICANT CHANGE UP (ref 0–8)
GLUCOSE SERPL-MCNC: 175 MG/DL — HIGH (ref 70–99)
HCT VFR BLD CALC: 30.6 % — LOW (ref 37–47)
HGB BLD-MCNC: 9.6 G/DL — LOW (ref 12–16)
IMM GRANULOCYTES NFR BLD AUTO: 0.6 % — HIGH (ref 0.1–0.3)
INR BLD: 0.96 RATIO — SIGNIFICANT CHANGE UP (ref 0.65–1.3)
LYMPHOCYTES # BLD AUTO: 1.36 K/UL — SIGNIFICANT CHANGE UP (ref 1.2–3.4)
LYMPHOCYTES # BLD AUTO: 7.5 % — LOW (ref 20.5–51.1)
MAGNESIUM SERPL-MCNC: 2.1 MG/DL — SIGNIFICANT CHANGE UP (ref 1.8–2.4)
MCHC RBC-ENTMCNC: 28.5 PG — SIGNIFICANT CHANGE UP (ref 27–31)
MCHC RBC-ENTMCNC: 31.4 G/DL — LOW (ref 32–37)
MCV RBC AUTO: 90.8 FL — SIGNIFICANT CHANGE UP (ref 81–99)
MONOCYTES # BLD AUTO: 0.78 K/UL — HIGH (ref 0.1–0.6)
MONOCYTES NFR BLD AUTO: 4.3 % — SIGNIFICANT CHANGE UP (ref 1.7–9.3)
MRSA PCR RESULT.: NEGATIVE — SIGNIFICANT CHANGE UP
NEUTROPHILS # BLD AUTO: 15.75 K/UL — HIGH (ref 1.4–6.5)
NEUTROPHILS NFR BLD AUTO: 87.4 % — HIGH (ref 42.2–75.2)
NRBC # BLD: 0 /100 WBCS — SIGNIFICANT CHANGE UP (ref 0–0)
PLATELET # BLD AUTO: 260 K/UL — SIGNIFICANT CHANGE UP (ref 130–400)
PMV BLD: 12 FL — HIGH (ref 7.4–10.4)
POTASSIUM SERPL-MCNC: 4.5 MMOL/L — SIGNIFICANT CHANGE UP (ref 3.5–5)
POTASSIUM SERPL-SCNC: 4.5 MMOL/L — SIGNIFICANT CHANGE UP (ref 3.5–5)
PROT SERPL-MCNC: 5.6 G/DL — LOW (ref 6–8)
PROTHROM AB SERPL-ACNC: 10.9 SEC — SIGNIFICANT CHANGE UP (ref 9.95–12.87)
RBC # BLD: 3.37 M/UL — LOW (ref 4.2–5.4)
RBC # FLD: 13.2 % — SIGNIFICANT CHANGE UP (ref 11.5–14.5)
SODIUM SERPL-SCNC: 139 MMOL/L — SIGNIFICANT CHANGE UP (ref 135–146)
SPECIMEN SOURCE: SIGNIFICANT CHANGE UP
WBC # BLD: 18.02 K/UL — HIGH (ref 4.8–10.8)
WBC # FLD AUTO: 18.02 K/UL — HIGH (ref 4.8–10.8)

## 2024-06-02 PROCEDURE — 99291 CRITICAL CARE FIRST HOUR: CPT

## 2024-06-02 PROCEDURE — 93010 ELECTROCARDIOGRAM REPORT: CPT

## 2024-06-02 PROCEDURE — 71045 X-RAY EXAM CHEST 1 VIEW: CPT | Mod: 26

## 2024-06-02 PROCEDURE — 99233 SBSQ HOSP IP/OBS HIGH 50: CPT

## 2024-06-02 RX ORDER — ASPIRIN/CALCIUM CARB/MAGNESIUM 324 MG
650 TABLET ORAL THREE TIMES A DAY
Refills: 0 | Status: DISCONTINUED | OUTPATIENT
Start: 2024-06-02 | End: 2024-06-05

## 2024-06-02 RX ADMIN — Medication 166.67 MILLIGRAM(S): at 08:08

## 2024-06-02 RX ADMIN — MORPHINE SULFATE 30 MILLIGRAM(S): 50 CAPSULE, EXTENDED RELEASE ORAL at 19:39

## 2024-06-02 RX ADMIN — SODIUM CHLORIDE 4 MILLILITER(S): 9 INJECTION INTRAMUSCULAR; INTRAVENOUS; SUBCUTANEOUS at 08:23

## 2024-06-02 RX ADMIN — MORPHINE SULFATE 60 MILLIGRAM(S): 50 CAPSULE, EXTENDED RELEASE ORAL at 08:15

## 2024-06-02 RX ADMIN — HEPARIN SODIUM 800 UNIT(S)/HR: 5000 INJECTION INTRAVENOUS; SUBCUTANEOUS at 06:02

## 2024-06-02 RX ADMIN — MORPHINE SULFATE 15 MILLIGRAM(S): 50 CAPSULE, EXTENDED RELEASE ORAL at 22:15

## 2024-06-02 RX ADMIN — HEPARIN SODIUM 800 UNIT(S)/HR: 5000 INJECTION INTRAVENOUS; SUBCUTANEOUS at 14:03

## 2024-06-02 RX ADMIN — MORPHINE SULFATE 30 MILLIGRAM(S): 50 CAPSULE, EXTENDED RELEASE ORAL at 19:03

## 2024-06-02 RX ADMIN — Medication 3 MILLILITER(S): at 19:31

## 2024-06-02 RX ADMIN — MORPHINE SULFATE 15 MILLIGRAM(S): 50 CAPSULE, EXTENDED RELEASE ORAL at 05:18

## 2024-06-02 RX ADMIN — Medication 1: at 12:03

## 2024-06-02 RX ADMIN — Medication 650 MILLIGRAM(S): at 21:33

## 2024-06-02 RX ADMIN — SODIUM CHLORIDE 4 MILLILITER(S): 9 INJECTION INTRAMUSCULAR; INTRAVENOUS; SUBCUTANEOUS at 19:30

## 2024-06-02 RX ADMIN — MORPHINE SULFATE 30 MILLIGRAM(S): 50 CAPSULE, EXTENDED RELEASE ORAL at 13:17

## 2024-06-02 RX ADMIN — PIPERACILLIN AND TAZOBACTAM 25 GRAM(S): 4; .5 INJECTION, POWDER, LYOPHILIZED, FOR SOLUTION INTRAVENOUS at 05:19

## 2024-06-02 RX ADMIN — PIPERACILLIN AND TAZOBACTAM 25 GRAM(S): 4; .5 INJECTION, POWDER, LYOPHILIZED, FOR SOLUTION INTRAVENOUS at 14:19

## 2024-06-02 RX ADMIN — PANTOPRAZOLE SODIUM 40 MILLIGRAM(S): 20 TABLET, DELAYED RELEASE ORAL at 05:14

## 2024-06-02 RX ADMIN — PIPERACILLIN AND TAZOBACTAM 25 GRAM(S): 4; .5 INJECTION, POWDER, LYOPHILIZED, FOR SOLUTION INTRAVENOUS at 21:33

## 2024-06-02 RX ADMIN — BUDESONIDE AND FORMOTEROL FUMARATE DIHYDRATE 2 PUFF(S): 160; 4.5 AEROSOL RESPIRATORY (INHALATION) at 20:44

## 2024-06-02 RX ADMIN — BUDESONIDE AND FORMOTEROL FUMARATE DIHYDRATE 2 PUFF(S): 160; 4.5 AEROSOL RESPIRATORY (INHALATION) at 08:09

## 2024-06-02 RX ADMIN — MORPHINE SULFATE 15 MILLIGRAM(S): 50 CAPSULE, EXTENDED RELEASE ORAL at 06:44

## 2024-06-02 RX ADMIN — Medication 1: at 16:10

## 2024-06-02 RX ADMIN — Medication 60 MILLIGRAM(S): at 05:18

## 2024-06-02 RX ADMIN — MORPHINE SULFATE 30 MILLIGRAM(S): 50 CAPSULE, EXTENDED RELEASE ORAL at 12:47

## 2024-06-02 RX ADMIN — CHLORHEXIDINE GLUCONATE 1 APPLICATION(S): 213 SOLUTION TOPICAL at 05:18

## 2024-06-02 RX ADMIN — Medication 3 MILLILITER(S): at 08:23

## 2024-06-02 RX ADMIN — Medication 0.6 MILLIGRAM(S): at 05:18

## 2024-06-02 RX ADMIN — Medication 1 MILLIGRAM(S): at 23:35

## 2024-06-02 RX ADMIN — MORPHINE SULFATE 15 MILLIGRAM(S): 50 CAPSULE, EXTENDED RELEASE ORAL at 21:35

## 2024-06-02 RX ADMIN — Medication 650 MILLIGRAM(S): at 14:21

## 2024-06-02 RX ADMIN — Medication 2000 UNIT(S): at 12:01

## 2024-06-02 RX ADMIN — Medication 1 MILLIGRAM(S): at 18:20

## 2024-06-02 RX ADMIN — Medication 1 MILLIGRAM(S): at 05:18

## 2024-06-02 RX ADMIN — Medication 1 MILLIGRAM(S): at 12:01

## 2024-06-02 NOTE — PROGRESS NOTE ADULT - SUBJECTIVE AND OBJECTIVE BOX
CCU Attending Note    ILEANA MCNEIL  MRN-[id number]    Date of Admission: 05-28-24    Brief HPI: 52 y/o female PMH HFpEF, SLE, antiphospholipid syndrome, RA, HTN, HLD, COPD who presents with pericardial effusion and cardiac tamponade. Course complicated by SLE exacerbation and PNA.     24 hr events:       acetaminophen     Tablet .. 650 milliGRAM(s) Oral every 6 hours PRN  albuterol    90 MICROgram(s) HFA Inhaler 2 Puff(s) Inhalation every 6 hours PRN  albuterol/ipratropium for Nebulization 3 milliLiter(s) Nebulizer every 12 hours  ALPRAZolam 1 milliGRAM(s) Oral four times a day  aluminum hydroxide/magnesium hydroxide/simethicone Suspension 30 milliLiter(s) Oral every 4 hours PRN  budesonide  80 MICROgram(s)/formoterol 4.5 MICROgram(s) Inhaler 2 Puff(s) Inhalation two times a day  chlorhexidine 2% Cloths 1 Application(s) Topical <User Schedule>  cholecalciferol 2000 Unit(s) Oral daily  colchicine 0.6 milliGRAM(s) Oral two times a day  dextrose 10% Bolus 125 milliLiter(s) IV Bolus once  dextrose 5%. 1000 milliLiter(s) IV Continuous <Continuous>  dextrose 5%. 1000 milliLiter(s) IV Continuous <Continuous>  dextrose 50% Injectable 25 Gram(s) IV Push once  dextrose 50% Injectable 12.5 Gram(s) IV Push once  dextrose Oral Gel 15 Gram(s) Oral once PRN  folic acid 1 milliGRAM(s) Oral daily  glucagon  Injectable 1 milliGRAM(s) IntraMuscular once  heparin   Injectable 6500 Unit(s) IV Push every 6 hours PRN  heparin   Injectable 3000 Unit(s) IV Push every 6 hours PRN  heparin  Infusion. 1000 Unit(s)/Hr IV Continuous <Continuous>  insulin lispro (ADMELOG) corrective regimen sliding scale   SubCutaneous three times a day before meals  melatonin 3 milliGRAM(s) Oral at bedtime PRN  methylPREDNISolone sodium succinate Injectable 60 milliGRAM(s) IV Push daily  morphine  IR 15 milliGRAM(s) Oral two times a day PRN  morphine ER Tablet 60 milliGRAM(s) Oral <User Schedule>  morphine ER Tablet 30 milliGRAM(s) Oral four times a day PRN  pantoprazole    Tablet 40 milliGRAM(s) Oral before breakfast  piperacillin/tazobactam IVPB.. 3.375 Gram(s) IV Intermittent every 8 hours  sodium chloride 3%  Inhalation 4 milliLiter(s) Inhalation every 12 hours  tiotropium 2.5 MICROgram(s) Inhaler 2 Puff(s) Inhalation daily  vancomycin  IVPB 1250 milliGRAM(s) IV Intermittent every 24 hours      PHYSICAL EXAM:  Vitals reviewed          ABP: --  ABP(mean): --  CVP(mm Hg): --  CVP(cm H2O): --  CO: --  CI: --  PA: --  PA(mean): --  PA(direct): --  PCWP: --  LA: --  RA: --  SVR: --  SVRI: --  PVR: --  PVRI: --    General Appearance: no acute distress	  Cardiovascular: regular rate and rhythm. No JVP  Respiratory: bilateral crackles  Gastrointestinal:  soft, non-tender  Skin/Integumen: no edema  Neurologic: non focal  Musculoskeletal/ extremities: warm  Vascular: Peripheral pulses palpable 2+ bilaterally    LABS: Reviewed        Culture - Fungal, Body Fluid (collected 30 May 2024 13:54)  Source: Pleural Fl Pleural Fluid  Preliminary Report (31 May 2024 07:25):    Testing in progress    Culture - Body Fluid with Gram Stain (collected 30 May 2024 13:54)  Source: Pleural Fl Pleural Fluid  Gram Stain (31 May 2024 03:18):    polymorphonuclear leukocytes seen    No organisms seen    by cytocentrifuge    Culture - Blood (collected 30 May 2024 02:13)  Source: .Blood Blood-Peripheral  Preliminary Report (31 May 2024 10:02):    No growth at 24 hours    Culture - Fungal, Body Fluid (collected 29 May 2024 17:00)  Source: .Body Fluid Other, Fluid  Preliminary Report (30 May 2024 09:28):    Testing in progress    Culture - Acid Fast - Body Fluid w/Smear (collected 29 May 2024 17:00)  Source: Pericardial Other, Pericardial Fluid    Culture - Body Fluid with Gram Stain (collected 29 May 2024 17:00)  Source: Pericardial Other, Pericardial Fluid  Gram Stain (30 May 2024 02:51):    polymorphonuclear leukocytes seen    No organisms seen    by cytocentrifuge  Preliminary Report (30 May 2024 17:44):    No growth        CARDIAC TESTING:  ECG:  	  Telemetry:  Echocardiogram: Normal LV function, small pericardial effusion compared to prior    OTHER DIAGNOSTIC TESTING:  CXR: Moderate L pleural effusion  CT:   CCU Attending Note    ILEANA MCNEIL  MRN-[id number]    Date of Admission: 05-28-24    Brief HPI: 52 y/o female PMH HFpEF, SLE, antiphospholipid syndrome, RA, HTN, HLD, COPD who presents with pericardial effusion and cardiac tamponade. Course complicated by SLE exacerbation and PNA.     24 hr events:   BETH, off HFNC, continues to have pericardial pain    acetaminophen     Tablet .. 650 milliGRAM(s) Oral every 6 hours PRN  albuterol    90 MICROgram(s) HFA Inhaler 2 Puff(s) Inhalation every 6 hours PRN  albuterol/ipratropium for Nebulization 3 milliLiter(s) Nebulizer every 12 hours  ALPRAZolam 1 milliGRAM(s) Oral four times a day  aluminum hydroxide/magnesium hydroxide/simethicone Suspension 30 milliLiter(s) Oral every 4 hours PRN  budesonide  80 MICROgram(s)/formoterol 4.5 MICROgram(s) Inhaler 2 Puff(s) Inhalation two times a day  chlorhexidine 2% Cloths 1 Application(s) Topical <User Schedule>  cholecalciferol 2000 Unit(s) Oral daily  colchicine 0.6 milliGRAM(s) Oral two times a day  dextrose 10% Bolus 125 milliLiter(s) IV Bolus once  dextrose 5%. 1000 milliLiter(s) IV Continuous <Continuous>  dextrose 5%. 1000 milliLiter(s) IV Continuous <Continuous>  dextrose 50% Injectable 25 Gram(s) IV Push once  dextrose 50% Injectable 12.5 Gram(s) IV Push once  dextrose Oral Gel 15 Gram(s) Oral once PRN  folic acid 1 milliGRAM(s) Oral daily  glucagon  Injectable 1 milliGRAM(s) IntraMuscular once  heparin   Injectable 6500 Unit(s) IV Push every 6 hours PRN  heparin   Injectable 3000 Unit(s) IV Push every 6 hours PRN  heparin  Infusion. 1000 Unit(s)/Hr IV Continuous <Continuous>  insulin lispro (ADMELOG) corrective regimen sliding scale   SubCutaneous three times a day before meals  melatonin 3 milliGRAM(s) Oral at bedtime PRN  methylPREDNISolone sodium succinate Injectable 60 milliGRAM(s) IV Push daily  morphine  IR 15 milliGRAM(s) Oral two times a day PRN  morphine ER Tablet 60 milliGRAM(s) Oral <User Schedule>  morphine ER Tablet 30 milliGRAM(s) Oral four times a day PRN  pantoprazole    Tablet 40 milliGRAM(s) Oral before breakfast  piperacillin/tazobactam IVPB.. 3.375 Gram(s) IV Intermittent every 8 hours  sodium chloride 3%  Inhalation 4 milliLiter(s) Inhalation every 12 hours  tiotropium 2.5 MICROgram(s) Inhaler 2 Puff(s) Inhalation daily  vancomycin  IVPB 1250 milliGRAM(s) IV Intermittent every 24 hours      PHYSICAL EXAM:  Vitals reviewed          ABP: --  ABP(mean): --  CVP(mm Hg): --  CVP(cm H2O): --  CO: --  CI: --  PA: --  PA(mean): --  PA(direct): --  PCWP: --  LA: --  RA: --  SVR: --  SVRI: --  PVR: --  PVRI: --    General Appearance: no acute distress	  Cardiovascular: regular rate and rhythm. No JVP  Respiratory: bilateral crackles, R>L  Gastrointestinal:  soft, non-tender  Skin/Integumen: no edema  Neurologic: non focal  Musculoskeletal/ extremities: warm  Vascular: Peripheral pulses palpable 2+ bilaterally    LABS: Reviewed        Culture - Fungal, Body Fluid (collected 30 May 2024 13:54)  Source: Pleural Fl Pleural Fluid  Preliminary Report (31 May 2024 07:25):    Testing in progress    Culture - Body Fluid with Gram Stain (collected 30 May 2024 13:54)  Source: Pleural Fl Pleural Fluid  Gram Stain (31 May 2024 03:18):    polymorphonuclear leukocytes seen    No organisms seen    by cytocentrifuge    Culture - Blood (collected 30 May 2024 02:13)  Source: .Blood Blood-Peripheral  Preliminary Report (31 May 2024 10:02):    No growth at 24 hours    Culture - Fungal, Body Fluid (collected 29 May 2024 17:00)  Source: .Body Fluid Other, Fluid  Preliminary Report (30 May 2024 09:28):    Testing in progress    Culture - Acid Fast - Body Fluid w/Smear (collected 29 May 2024 17:00)  Source: Pericardial Other, Pericardial Fluid    Culture - Body Fluid with Gram Stain (collected 29 May 2024 17:00)  Source: Pericardial Other, Pericardial Fluid  Gram Stain (30 May 2024 02:51):    polymorphonuclear leukocytes seen    No organisms seen    by cytocentrifuge  Preliminary Report (30 May 2024 17:44):    No growth        CARDIAC TESTING:  ECG:  	  Telemetry:  Echocardiogram: Normal LV function, small pericardial effusion compared to prior    OTHER DIAGNOSTIC TESTING:  CXR: Moderate L pleural effusion  CT: R lobe opacities

## 2024-06-02 NOTE — PROGRESS NOTE ADULT - SUBJECTIVE AND OBJECTIVE BOX
Patient is a 53y old  Female who presents with a chief complaint of Large pericardial effusion (01 Jun 2024 19:00)      Over Night Events:  Patient seen and examined.   on NC 4 liters       ROS:  See HPI    PHYSICAL EXAM    ICU Vital Signs Last 24 Hrs  T(C): 35.3 (02 Jun 2024 08:00), Max: 36.1 (01 Jun 2024 20:00)  T(F): 95.5 (02 Jun 2024 08:00), Max: 96.9 (01 Jun 2024 20:00)  HR: 80 (02 Jun 2024 08:00) (69 - 104)  BP: 131/63 (02 Jun 2024 08:00) (101/66 - 136/67)  BP(mean): 91 (02 Jun 2024 08:00) (66 - 94)  ABP: --  ABP(mean): --  RR: 22 (02 Jun 2024 08:00) (14 - 48)  SpO2: 96% (02 Jun 2024 08:00) (88% - 100%)    O2 Parameters below as of 02 Jun 2024 08:00  Patient On (Oxygen Delivery Method): nasal cannula  O2 Flow (L/min): 4          General: awake   HEENT:       valeriano         Lymph Nodes: NO cervical LN   Lungs: decrease left lower    Cardiovascular: Regular   Abdomen: Soft, Positive BS  Extremities: No clubbing   Skin: warm   Neurological:  no focal   Musculoskeletal: move all ext     I&O's Detail    01 Jun 2024 07:01  -  02 Jun 2024 07:00  --------------------------------------------------------  IN:    Heparin Infusion: 220 mL    IV PiggyBack: 200 mL    Oral Fluid: 680 mL  Total IN: 1100 mL    OUT:    Voided (mL): 300 mL  Total OUT: 300 mL    Total NET: 800 mL          LABS:                          9.6    18.02 )-----------( 260      ( 02 Jun 2024 04:38 )             30.6         02 Jun 2024 04:38    139    |  104    |  22     ----------------------------<  175    4.5     |  23     |  1.5      Ca    8.9        02 Jun 2024 04:38  Phos  3.4       01 Jun 2024 04:43  Mg     2.1       02 Jun 2024 04:38    TPro  5.6    /  Alb  3.2    /  TBili  <0.2   /  DBili  x      /  AST  16     /  ALT  21     /  AlkPhos  116    02 Jun 2024 04:38  Amylase x     lipase x                                                 PTT - ( 02 Jun 2024 04:38 )  PTT:105.6 sec                                       Urinalysis Basic - ( 02 Jun 2024 04:38 )    Color: x / Appearance: x / SG: x / pH: x  Gluc: 175 mg/dL / Ketone: x  / Bili: x / Urobili: x   Blood: x / Protein: x / Nitrite: x   Leuk Esterase: x / RBC: x / WBC x   Sq Epi: x / Non Sq Epi: x / Bacteria: x        Lactate, Blood: 0.7 mmol/L (05-31-24 @ 05:20)                                                          Culture - Blood (collected 31 May 2024 16:41)  Source: .Blood None  Preliminary Report (02 Jun 2024 01:03):    No growth at 24 hours    Culture - Blood (collected 31 May 2024 11:40)  Source: .Blood None  Preliminary Report (01 Jun 2024 22:02):    No growth at 24 hours    Culture - Blood (collected 31 May 2024 05:00)  Source: .Blood None  Preliminary Report (01 Jun 2024 14:02):    No growth at 24 hours    Culture - Fungal, Body Fluid (collected 30 May 2024 13:54)  Source: Pleural Fl Pleural Fluid  Preliminary Report (01 Jun 2024 23:03):    Culture is being performed. Fungal cultures are held for 4 weeks.    Culture - Body Fluid with Gram Stain (collected 30 May 2024 13:54)  Source: Pleural Fl Pleural Fluid  Gram Stain (31 May 2024 03:18):    polymorphonuclear leukocytes seen    No organisms seen    by cytocentrifuge                                                                                           MEDICATIONS  (STANDING):  albuterol/ipratropium for Nebulization 3 milliLiter(s) Nebulizer every 12 hours  ALPRAZolam 1 milliGRAM(s) Oral four times a day  budesonide  80 MICROgram(s)/formoterol 4.5 MICROgram(s) Inhaler 2 Puff(s) Inhalation two times a day  chlorhexidine 2% Cloths 1 Application(s) Topical <User Schedule>  cholecalciferol 2000 Unit(s) Oral daily  colchicine 0.6 milliGRAM(s) Oral two times a day  dextrose 10% Bolus 125 milliLiter(s) IV Bolus once  dextrose 5%. 1000 milliLiter(s) (100 mL/Hr) IV Continuous <Continuous>  dextrose 5%. 1000 milliLiter(s) (50 mL/Hr) IV Continuous <Continuous>  dextrose 50% Injectable 25 Gram(s) IV Push once  dextrose 50% Injectable 12.5 Gram(s) IV Push once  folic acid 1 milliGRAM(s) Oral daily  glucagon  Injectable 1 milliGRAM(s) IntraMuscular once  heparin  Infusion. 1000 Unit(s)/Hr (10 mL/Hr) IV Continuous <Continuous>  insulin lispro (ADMELOG) corrective regimen sliding scale   SubCutaneous three times a day before meals  methylPREDNISolone sodium succinate Injectable 60 milliGRAM(s) IV Push daily  morphine ER Tablet 60 milliGRAM(s) Oral <User Schedule>  pantoprazole    Tablet 40 milliGRAM(s) Oral before breakfast  piperacillin/tazobactam IVPB.. 3.375 Gram(s) IV Intermittent every 8 hours  sodium chloride 3%  Inhalation 4 milliLiter(s) Inhalation every 12 hours  tiotropium 2.5 MICROgram(s) Inhaler 2 Puff(s) Inhalation daily  vancomycin  IVPB 1250 milliGRAM(s) IV Intermittent every 24 hours    MEDICATIONS  (PRN):  acetaminophen     Tablet .. 650 milliGRAM(s) Oral every 6 hours PRN Temp greater or equal to 38C (100.4F), Severe Pain (7 - 10)  albuterol    90 MICROgram(s) HFA Inhaler 2 Puff(s) Inhalation every 6 hours PRN Shortness of Breath and/or Wheezing  aluminum hydroxide/magnesium hydroxide/simethicone Suspension 30 milliLiter(s) Oral every 4 hours PRN Dyspepsia  dextrose Oral Gel 15 Gram(s) Oral once PRN Blood Glucose LESS THAN 70 milliGRAM(s)/deciliter  heparin   Injectable 6500 Unit(s) IV Push every 6 hours PRN For aPTT less than 40  heparin   Injectable 3000 Unit(s) IV Push every 6 hours PRN For aPTT between 40 - 57  melatonin 3 milliGRAM(s) Oral at bedtime PRN Insomnia  morphine  IR 15 milliGRAM(s) Oral two times a day PRN Severe Pain (7 - 10)  morphine ER Tablet 30 milliGRAM(s) Oral four times a day PRN severe pain          Xrays:  TLC:  OG:  ET tube:                                                                                    stable left lower effusion / opacity    ECHO:  [facility] ICU:

## 2024-06-02 NOTE — PROGRESS NOTE ADULT - ASSESSMENT
Impression:     Left pleural effusion - moderate   Large pericardial effusion s/p pericardiocentesis   Left lung atelectasis  Acute hypoxemic respiratory failure   History of COPD   SLE, lupus AC, APLS on AC   History of cervical and laryngeal cancer   TIAN     Plan:     S/P thoracentesis with 650cc removed   repeat ct scan trace left pleural effusion / small opacity and trace pericardial effusion   on NC   now TIAN follow cardiology regarding colchicine to be held   Fluid analysis pending   CT chest noted   Wean down Fio2  now 9#% on RA    id follow up   check nasal mrsa , procal mild elevated   on prednisone   consider d/c vanco emanuel on zosyn and TIAN   No obvious indications for bronch   Follow pericardial fluid studies and cytology   Resumed on heparin; PTT therapeutic   ESR and CRP elevated; Rheum meval given SLE and APLS syndrome history   Steroids: Solumedrol 1 mg / kg for now: 80mg daily   Chest PT   Further care per Cardiology from pulmonary can downgrade to floor or tele disposition as per cardiology

## 2024-06-02 NOTE — CHART NOTE - NSCHARTNOTESELECT_GEN_ALL_CORE
Event Note
Interventional Cardiology/Event Note
Pericardiocentesis/Event Note
Event Note
Pericardial Drain Removal/Event Note
Transfer Note

## 2024-06-02 NOTE — PROGRESS NOTE ADULT - ASSESSMENT
52 y/o female PMH HFpEF, SLE, antiphospholipid syndrome, RA, HTN, HLD, COPD who presents with dyspnea and pleuritic chest pain. 1 week prior patient admitted for PNA and small pericardial effusion. Patient found to have enlarging effusion with tamponade physiology and underwent successful pericardiocentesis on 5/29/24. Pericardial effusion was likely 2/2 SLE. She continues to have acute hypoxic respiratory failure which is the major issue at this time along with recurrent pleural effusion despite thoracentesis.     Labs reviewed with stable anemia H/H ~9-10. Slightly elevated leukocytosis likely 2/2 steroids. Otherwise unremarkable. Echo reviewed with normal function and small pericardial effusion. Cultures have been negative but continue to be on empiric abx per ID.     On exam she is warm, well perfused, and euvolemic. HR and BP well controlled. Continuing on colchicine for pericarditis.     Plan  -Continue colchicine 0.6 mg BID for at least 3 months  -CT chest to evaluate effusion  -ID consult for PNA and completing empiric treatment for 7 days, EOD 6/3  -Rheumatology consult for SLE exacerbation    Carrillo Jessica MD  Interventional Cardiology/Advanced Heart Failure Transplant   54 y/o female PMH HFpEF, SLE, antiphospholipid syndrome, RA, HTN, HLD, COPD who presents with dyspnea and pleuritic chest pain. 1 week prior patient admitted for PNA and small pericardial effusion. Patient found to have enlarging effusion with tamponade physiology and underwent successful pericardiocentesis on 5/29/24. Pericardial effusion was likely 2/2 SLE. She continues to have acute hypoxic respiratory failure which is the major issue at this time along with recurrent pleural effusion despite thoracentesis.     Labs reviewed with stable anemia H/H ~9-10. Slightly elevated leukocytosis likely 2/2 steroids. Cre increase to 1.5. Otherwise unremarkable. Echo reviewed with normal function and small pericardial effusion. Cultures have been negative but continue to be on empiric abx per ID.     On exam she is warm, well perfused, and euvolemic. HR and BP well controlled. Continuing on colchicine for pericarditis.     Plan  -Continue colchicine 0.6 mg BID for at least 3 months  -High dose ASA for pericardial pain  -ID consult for PNA and completing empiric treatment for 7 days, EOD 6/3  -Rheumatology consult for SLE exacerbation, continuing steroids    Carrillo Jessica MD  Interventional Cardiology/Advanced Heart Failure Transplant   54 y/o female PMH HFpEF, SLE, antiphospholipid syndrome, RA, HTN, HLD, COPD who presents with dyspnea and pleuritic chest pain. 1 week prior patient admitted for PNA and small pericardial effusion. Patient found to have enlarging effusion with tamponade physiology and underwent successful pericardiocentesis on 5/29/24. Pericardial effusion was likely 2/2 SLE. Acute hypoxic respiratory failure continues to be major issue but improving.    Labs reviewed with stable anemia H/H ~9-10. Leukocytosis likely 2/2 steroids. Cre increase to 1.5, will encourage PO intake. Otherwise unremarkable. Echo reviewed with normal function and small pericardial effusion. Cultures have been negative but continue to be on empiric abx per ID.     On exam she is warm, well perfused, and euvolemic. HR and BP well controlled. Continuing on colchicine for pericarditis. She continues to have pericardial pain and will start high dose ASA.    Plan  -Continue colchicine 0.6 mg BID for at least 3 months  -High dose ASA, 650 mg TID for at least the next week for pericardial pain  -ID consult for PNA and completing empiric treatment for 7 days, EOD 6/3  -Rheumatology consult for SLE exacerbation, continuing steroids  -Transfer to medicine floor    Carrillo Jessica MD  Interventional Cardiology/Advanced Heart Failure Transplant

## 2024-06-02 NOTE — CHART NOTE - NSCHARTNOTEFT_GEN_A_CORE
Transfer from: CCU   Transfer to: Medicine floors  Approved by: Dr. Jessica     H&P   Pt is a 54 y/o F PMHx  of HTN, HLD, COPD on home O2, SLE, lupus anticoagulant, antiphospholipid syndrome,  rheumatoid arthritis, cervical cancer sp hysterectomy,  laryngeal cancer s/p CT, RT, HFpEF (06/21/19 EF 63%) s/p AICD+PPM, DVT/PE (on lovenox QD),  NSTEMI in 2015, subclavian stenosis s/p stent placement presenting for shortness of breath and pleuritic chest pain. Pt reports on Wed she woke up with pleuritic chest pain and increased SOB. Pt with recent admission 1 week ago for Pneumonia, was DC from hospital on Sunday with oral Abx regimen. States she did not feel well at home, continued to have SOB, intermittent fevers, and increasing need for O2 so she came back to the hospital.    Vitals: /77  RR 20 T99F --> 100.9F Sat 96% on 2L NC    CT Angio Chest PE Protocol w/ IV Cont significant for increased size large pericardial effusion    Labs: WBC 15.7 proBNP 208 Trop 10 Lactate 0.8      #Large pericardial effusion, early tamponade physiology present based on bedside ECHO  #Left pleural effusion s/p thoracentesis  #Community acquired pneumonia  #Risk for sepsis  #HFpEF  #SLE/APLS/RA  #Hx DVT/PE on lovenox  #Hx subclavian stenosis  #Hx COPD on home O2  #Hx of torsades  #NSTEMI in 2015  #chronic morphine for pain   - Pain control per home meds.   - HOB @ 45. Monitor Pulse Ox. Keep 88-92% on HFNC 60/60, Bipap at night and prn for desat. s/p left thoracentesis 650 cloudy fluid, f/u pleural fluid tests.  - 6/1 ID consult for PNA and completing empiric treatment for 7 days, EOD 6/3, 6/2/24 satting well on RA  - pleuritic chest pain, trop -ve*2, ekg with low voltage, no ischemic changes on ekg, no diffuse ST elevations to indicate pericarditis.   -  Large pericardial effusion. Early tamponade physiology based on bedside ECHO. C/w colchicine. IVC collapsable. Goal directed fluid resuscitation. S/p pericardial drain with 950 cc bloody output on 5/29. drain output 40cc since insertion, drain removed 5/30.   - Repeat TTE showing no reaccumulation of effusion.   -6/1 Continue colchicine 0.6 mg BID for at least 3 months. CT chest to evaluate effusion.  - Abx broadened to vanc zosyn. repeat infectious work up sent.  Ua negative.  FU BCx. Ucx. GI PCR. f/u ID recs.   - dc vanc 2/2 TIAN   - heparin ggt  - Solumedrol 60mg Qday, pending official rheumatology note. Etiology of fevers most likely autoimmune.   -6/1 Rheumatology consult for SLE exacerbation f/u   - Monitor FS. Insulin protocol if needed FS goal 140-180  -  Ambulate as tolerated     # Misc  - DVT Prophylaxis: heparin  - GI Prophylaxis: pantoprazole  - Diet: DASH    plan for f/u   -Continue colchicine 0.6 mg BID for at least 3 months  -High dose ASA, 650 mg TID for at least the next week for pericardial pain  -ID consult for PNA and completing empiric treatment for 7 days, EOD 6/3  -Rheumatology consult for SLE exacerbation, continuing steroids  -Transfer to medicine floor

## 2024-06-03 LAB
ALBUMIN SERPL ELPH-MCNC: 3.2 G/DL — LOW (ref 3.5–5.2)
ALP SERPL-CCNC: 135 U/L — HIGH (ref 30–115)
ALT FLD-CCNC: 24 U/L — SIGNIFICANT CHANGE UP (ref 0–41)
ANION GAP SERPL CALC-SCNC: 13 MMOL/L — SIGNIFICANT CHANGE UP (ref 7–14)
APTT BLD: 58.7 SEC — HIGH (ref 27–39.2)
APTT BLD: 65 SEC — HIGH (ref 27–39.2)
AST SERPL-CCNC: 14 U/L — SIGNIFICANT CHANGE UP (ref 0–41)
BASOPHILS # BLD AUTO: 0.01 K/UL — SIGNIFICANT CHANGE UP (ref 0–0.2)
BASOPHILS NFR BLD AUTO: 0.1 % — SIGNIFICANT CHANGE UP (ref 0–1)
BILIRUB SERPL-MCNC: <0.2 MG/DL — SIGNIFICANT CHANGE UP (ref 0.2–1.2)
BUN SERPL-MCNC: 24 MG/DL — HIGH (ref 10–20)
CALCIUM SERPL-MCNC: 9 MG/DL — SIGNIFICANT CHANGE UP (ref 8.4–10.4)
CHLORIDE SERPL-SCNC: 103 MMOL/L — SIGNIFICANT CHANGE UP (ref 98–110)
CK MB BLD-MCNC: NEGATIVE TITER — SIGNIFICANT CHANGE UP
CO2 SERPL-SCNC: 24 MMOL/L — SIGNIFICANT CHANGE UP (ref 17–32)
COXSACKIE TYPE A-24: NEGATIVE TITER — SIGNIFICANT CHANGE UP
CREAT SERPL-MCNC: 1.3 MG/DL — SIGNIFICANT CHANGE UP (ref 0.7–1.5)
CULTURE RESULTS: SIGNIFICANT CHANGE UP
CV A24 IGG TITR SER IF: NEGATIVE TITER — SIGNIFICANT CHANGE UP
CV A7 AB SER-ACNC: NEGATIVE TITER — SIGNIFICANT CHANGE UP
CV A9 AB TITR FLD: NEGATIVE TITER — SIGNIFICANT CHANGE UP
EGFR: 49 ML/MIN/1.73M2 — LOW
EOSINOPHIL # BLD AUTO: 0.01 K/UL — SIGNIFICANT CHANGE UP (ref 0–0.7)
EOSINOPHIL NFR BLD AUTO: 0.1 % — SIGNIFICANT CHANGE UP (ref 0–8)
FUNGITELL: <31 PG/ML — SIGNIFICANT CHANGE UP
GLUCOSE BLDC GLUCOMTR-MCNC: 172 MG/DL — HIGH (ref 70–99)
GLUCOSE BLDC GLUCOMTR-MCNC: 199 MG/DL — HIGH (ref 70–99)
GLUCOSE BLDC GLUCOMTR-MCNC: 244 MG/DL — HIGH (ref 70–99)
GLUCOSE SERPL-MCNC: 160 MG/DL — HIGH (ref 70–99)
HADV DNA FLD NAA+PROBE-LOG#: SIGNIFICANT CHANGE UP COPIES/ML
HCT VFR BLD CALC: 33.5 % — LOW (ref 37–47)
HGB BLD-MCNC: 10.4 G/DL — LOW (ref 12–16)
IMM GRANULOCYTES NFR BLD AUTO: 1.1 % — HIGH (ref 0.1–0.3)
INR BLD: 0.96 RATIO — SIGNIFICANT CHANGE UP (ref 0.65–1.3)
LYMPHOCYTES # BLD AUTO: 2.7 K/UL — SIGNIFICANT CHANGE UP (ref 1.2–3.4)
LYMPHOCYTES # BLD AUTO: 20.5 % — SIGNIFICANT CHANGE UP (ref 20.5–51.1)
MAGNESIUM SERPL-MCNC: 1.7 MG/DL — LOW (ref 1.8–2.4)
MCHC RBC-ENTMCNC: 28.5 PG — SIGNIFICANT CHANGE UP (ref 27–31)
MCHC RBC-ENTMCNC: 31 G/DL — LOW (ref 32–37)
MCV RBC AUTO: 91.8 FL — SIGNIFICANT CHANGE UP (ref 81–99)
MONOCYTES # BLD AUTO: 0.9 K/UL — HIGH (ref 0.1–0.6)
MONOCYTES NFR BLD AUTO: 6.8 % — SIGNIFICANT CHANGE UP (ref 1.7–9.3)
NEUTROPHILS # BLD AUTO: 9.43 K/UL — HIGH (ref 1.4–6.5)
NEUTROPHILS NFR BLD AUTO: 71.4 % — SIGNIFICANT CHANGE UP (ref 42.2–75.2)
NRBC # BLD: 0 /100 WBCS — SIGNIFICANT CHANGE UP (ref 0–0)
PLATELET # BLD AUTO: 329 K/UL — SIGNIFICANT CHANGE UP (ref 130–400)
PMV BLD: 10.8 FL — HIGH (ref 7.4–10.4)
POTASSIUM SERPL-MCNC: 3.9 MMOL/L — SIGNIFICANT CHANGE UP (ref 3.5–5)
POTASSIUM SERPL-SCNC: 3.9 MMOL/L — SIGNIFICANT CHANGE UP (ref 3.5–5)
PROT SERPL-MCNC: 5.6 G/DL — LOW (ref 6–8)
PROTHROM AB SERPL-ACNC: 10.9 SEC — SIGNIFICANT CHANGE UP (ref 9.95–12.87)
RBC # BLD: 3.65 M/UL — LOW (ref 4.2–5.4)
RBC # FLD: 13.5 % — SIGNIFICANT CHANGE UP (ref 11.5–14.5)
SODIUM SERPL-SCNC: 140 MMOL/L — SIGNIFICANT CHANGE UP (ref 135–146)
SPECIMEN SOURCE: SIGNIFICANT CHANGE UP
WBC # BLD: 13.2 K/UL — HIGH (ref 4.8–10.8)
WBC # FLD AUTO: 13.2 K/UL — HIGH (ref 4.8–10.8)

## 2024-06-03 PROCEDURE — 71045 X-RAY EXAM CHEST 1 VIEW: CPT | Mod: 26

## 2024-06-03 PROCEDURE — 93010 ELECTROCARDIOGRAM REPORT: CPT

## 2024-06-03 PROCEDURE — 99291 CRITICAL CARE FIRST HOUR: CPT

## 2024-06-03 RX ORDER — POTASSIUM CHLORIDE 20 MEQ
40 PACKET (EA) ORAL ONCE
Refills: 0 | Status: COMPLETED | OUTPATIENT
Start: 2024-06-03 | End: 2024-06-03

## 2024-06-03 RX ORDER — MAGNESIUM SULFATE 500 MG/ML
2 VIAL (ML) INJECTION ONCE
Refills: 0 | Status: COMPLETED | OUTPATIENT
Start: 2024-06-03 | End: 2024-06-03

## 2024-06-03 RX ORDER — MORPHINE SULFATE 50 MG/1
4 CAPSULE, EXTENDED RELEASE ORAL ONCE
Refills: 0 | Status: DISCONTINUED | OUTPATIENT
Start: 2024-06-03 | End: 2024-06-03

## 2024-06-03 RX ADMIN — MORPHINE SULFATE 30 MILLIGRAM(S): 50 CAPSULE, EXTENDED RELEASE ORAL at 06:42

## 2024-06-03 RX ADMIN — Medication 1 MILLIGRAM(S): at 11:16

## 2024-06-03 RX ADMIN — CHLORHEXIDINE GLUCONATE 1 APPLICATION(S): 213 SOLUTION TOPICAL at 06:15

## 2024-06-03 RX ADMIN — HEPARIN SODIUM 1000 UNIT(S)/HR: 5000 INJECTION INTRAVENOUS; SUBCUTANEOUS at 06:14

## 2024-06-03 RX ADMIN — Medication 650 MILLIGRAM(S): at 21:33

## 2024-06-03 RX ADMIN — MORPHINE SULFATE 30 MILLIGRAM(S): 50 CAPSULE, EXTENDED RELEASE ORAL at 17:35

## 2024-06-03 RX ADMIN — MORPHINE SULFATE 4 MILLIGRAM(S): 50 CAPSULE, EXTENDED RELEASE ORAL at 03:30

## 2024-06-03 RX ADMIN — HEPARIN SODIUM 1000 UNIT(S)/HR: 5000 INJECTION INTRAVENOUS; SUBCUTANEOUS at 17:57

## 2024-06-03 RX ADMIN — Medication 2000 UNIT(S): at 11:17

## 2024-06-03 RX ADMIN — Medication 2: at 12:34

## 2024-06-03 RX ADMIN — Medication 1: at 18:37

## 2024-06-03 RX ADMIN — MORPHINE SULFATE 30 MILLIGRAM(S): 50 CAPSULE, EXTENDED RELEASE ORAL at 07:00

## 2024-06-03 RX ADMIN — Medication 40 MILLIEQUIVALENT(S): at 11:15

## 2024-06-03 RX ADMIN — MORPHINE SULFATE 60 MILLIGRAM(S): 50 CAPSULE, EXTENDED RELEASE ORAL at 08:37

## 2024-06-03 RX ADMIN — MORPHINE SULFATE 30 MILLIGRAM(S): 50 CAPSULE, EXTENDED RELEASE ORAL at 11:35

## 2024-06-03 RX ADMIN — PANTOPRAZOLE SODIUM 40 MILLIGRAM(S): 20 TABLET, DELAYED RELEASE ORAL at 07:00

## 2024-06-03 RX ADMIN — HEPARIN SODIUM 1000 UNIT(S)/HR: 5000 INJECTION INTRAVENOUS; SUBCUTANEOUS at 00:13

## 2024-06-03 RX ADMIN — PIPERACILLIN AND TAZOBACTAM 25 GRAM(S): 4; .5 INJECTION, POWDER, LYOPHILIZED, FOR SOLUTION INTRAVENOUS at 14:10

## 2024-06-03 RX ADMIN — MORPHINE SULFATE 4 MILLIGRAM(S): 50 CAPSULE, EXTENDED RELEASE ORAL at 03:42

## 2024-06-03 RX ADMIN — Medication 3 MILLILITER(S): at 07:56

## 2024-06-03 RX ADMIN — PIPERACILLIN AND TAZOBACTAM 25 GRAM(S): 4; .5 INJECTION, POWDER, LYOPHILIZED, FOR SOLUTION INTRAVENOUS at 06:15

## 2024-06-03 RX ADMIN — Medication 650 MILLIGRAM(S): at 06:15

## 2024-06-03 RX ADMIN — Medication 1 MILLIGRAM(S): at 18:37

## 2024-06-03 RX ADMIN — HEPARIN SODIUM 1000 UNIT(S)/HR: 5000 INJECTION INTRAVENOUS; SUBCUTANEOUS at 19:33

## 2024-06-03 RX ADMIN — MORPHINE SULFATE 30 MILLIGRAM(S): 50 CAPSULE, EXTENDED RELEASE ORAL at 17:05

## 2024-06-03 RX ADMIN — SODIUM CHLORIDE 4 MILLILITER(S): 9 INJECTION INTRAMUSCULAR; INTRAVENOUS; SUBCUTANEOUS at 07:56

## 2024-06-03 RX ADMIN — MORPHINE SULFATE 30 MILLIGRAM(S): 50 CAPSULE, EXTENDED RELEASE ORAL at 21:33

## 2024-06-03 RX ADMIN — Medication 650 MILLIGRAM(S): at 14:09

## 2024-06-03 RX ADMIN — MORPHINE SULFATE 60 MILLIGRAM(S): 50 CAPSULE, EXTENDED RELEASE ORAL at 08:07

## 2024-06-03 RX ADMIN — Medication 25 GRAM(S): at 11:14

## 2024-06-03 RX ADMIN — Medication 1 MILLIGRAM(S): at 06:42

## 2024-06-03 RX ADMIN — Medication 1 MILLIGRAM(S): at 11:36

## 2024-06-03 RX ADMIN — MORPHINE SULFATE 30 MILLIGRAM(S): 50 CAPSULE, EXTENDED RELEASE ORAL at 11:55

## 2024-06-03 RX ADMIN — MORPHINE SULFATE 30 MILLIGRAM(S): 50 CAPSULE, EXTENDED RELEASE ORAL at 22:03

## 2024-06-03 RX ADMIN — Medication 25 GRAM(S): at 13:03

## 2024-06-03 RX ADMIN — BUDESONIDE AND FORMOTEROL FUMARATE DIHYDRATE 2 PUFF(S): 160; 4.5 AEROSOL RESPIRATORY (INHALATION) at 08:22

## 2024-06-03 RX ADMIN — TIOTROPIUM BROMIDE 2 PUFF(S): 18 CAPSULE ORAL; RESPIRATORY (INHALATION) at 08:22

## 2024-06-03 RX ADMIN — HEPARIN SODIUM 1000 UNIT(S)/HR: 5000 INJECTION INTRAVENOUS; SUBCUTANEOUS at 13:39

## 2024-06-03 RX ADMIN — Medication 60 MILLIGRAM(S): at 06:15

## 2024-06-03 RX ADMIN — PIPERACILLIN AND TAZOBACTAM 25 GRAM(S): 4; .5 INJECTION, POWDER, LYOPHILIZED, FOR SOLUTION INTRAVENOUS at 21:32

## 2024-06-03 NOTE — PROGRESS NOTE ADULT - ASSESSMENT
54 y/o female PMH HFpEF, SLE, antiphospholipid syndrome, RA, HTN, HLD, COPD who presents with dyspnea and pleuritic chest pain. 1 week prior patient admitted for PNA and small pericardial effusion. Patient found to have enlarging effusion with tamponade physiology and underwent successful pericardiocentesis on 5/29/24. Pericardial effusion was likely 2/2 SLE. Acute hypoxic respiratory failure continues to be major issue but improving.    Labs reviewed with stable anemia H/H ~9-10. Leukocytosis likely 2/2 steroids. Cre improved to 1.3. Otherwise unremarkable. Echo reviewed with normal function and small pericardial effusion. Cultures have been negative but continue to be on empiric abx per ID.     On exam she is warm, well perfused, and euvolemic. HR and BP well controlled. Continuing on colchicine for pericarditis. She continues to have pericardial pain and will continue high dose ASA.    Plan  -Continue colchicine 0.6 mg BID for at least 3 months  -High dose ASA, 650 mg TID for at least the next week for pericardial pain  -ID consult for PNA and completing empiric treatment for 7 days, EOD 6/6  -Rheumatology consult for SLE exacerbation, continuing steroids  -Transfer to medicine floor    Carrillo Jessica MD  Interventional Cardiology/Advanced Heart Failure Transplant

## 2024-06-03 NOTE — PROGRESS NOTE ADULT - SUBJECTIVE AND OBJECTIVE BOX
CCU Attending Note    ILEANA MCNEIL  MRN-[id number]    Date of Admission: 05-28-24    Brief HPI: 52 y/o female PMH HFpEF, SLE, antiphospholipid syndrome, RA, HTN, HLD, COPD who presents with pericardial effusion and cardiac tamponade. Course complicated by SLE exacerbation and PNA.     24 hr events:   BETH, off HFNC, continues to have pericardial pain    acetaminophen     Tablet .. 650 milliGRAM(s) Oral every 6 hours PRN  albuterol    90 MICROgram(s) HFA Inhaler 2 Puff(s) Inhalation every 6 hours PRN  albuterol/ipratropium for Nebulization 3 milliLiter(s) Nebulizer every 12 hours  ALPRAZolam 1 milliGRAM(s) Oral four times a day  aluminum hydroxide/magnesium hydroxide/simethicone Suspension 30 milliLiter(s) Oral every 4 hours PRN  budesonide  80 MICROgram(s)/formoterol 4.5 MICROgram(s) Inhaler 2 Puff(s) Inhalation two times a day  chlorhexidine 2% Cloths 1 Application(s) Topical <User Schedule>  cholecalciferol 2000 Unit(s) Oral daily  colchicine 0.6 milliGRAM(s) Oral two times a day  dextrose 10% Bolus 125 milliLiter(s) IV Bolus once  dextrose 5%. 1000 milliLiter(s) IV Continuous <Continuous>  dextrose 5%. 1000 milliLiter(s) IV Continuous <Continuous>  dextrose 50% Injectable 25 Gram(s) IV Push once  dextrose 50% Injectable 12.5 Gram(s) IV Push once  dextrose Oral Gel 15 Gram(s) Oral once PRN  folic acid 1 milliGRAM(s) Oral daily  glucagon  Injectable 1 milliGRAM(s) IntraMuscular once  heparin   Injectable 6500 Unit(s) IV Push every 6 hours PRN  heparin   Injectable 3000 Unit(s) IV Push every 6 hours PRN  heparin  Infusion. 1000 Unit(s)/Hr IV Continuous <Continuous>  insulin lispro (ADMELOG) corrective regimen sliding scale   SubCutaneous three times a day before meals  melatonin 3 milliGRAM(s) Oral at bedtime PRN  methylPREDNISolone sodium succinate Injectable 60 milliGRAM(s) IV Push daily  morphine  IR 15 milliGRAM(s) Oral two times a day PRN  morphine ER Tablet 60 milliGRAM(s) Oral <User Schedule>  morphine ER Tablet 30 milliGRAM(s) Oral four times a day PRN  pantoprazole    Tablet 40 milliGRAM(s) Oral before breakfast  piperacillin/tazobactam IVPB.. 3.375 Gram(s) IV Intermittent every 8 hours  sodium chloride 3%  Inhalation 4 milliLiter(s) Inhalation every 12 hours  tiotropium 2.5 MICROgram(s) Inhaler 2 Puff(s) Inhalation daily  vancomycin  IVPB 1250 milliGRAM(s) IV Intermittent every 24 hours      PHYSICAL EXAM:  Vitals reviewed          ABP: --  ABP(mean): --  CVP(mm Hg): --  CVP(cm H2O): --  CO: --  CI: --  PA: --  PA(mean): --  PA(direct): --  PCWP: --  LA: --  RA: --  SVR: --  SVRI: --  PVR: --  PVRI: --    General Appearance: no acute distress	  Cardiovascular: regular rate and rhythm. No JVP  Respiratory: bilateral crackles, R>L  Gastrointestinal:  soft, non-tender  Skin/Integumen: no edema  Neurologic: non focal  Musculoskeletal/ extremities: warm  Vascular: Peripheral pulses palpable 2+ bilaterally    LABS: Reviewed        Culture - Fungal, Body Fluid (collected 30 May 2024 13:54)  Source: Pleural Fl Pleural Fluid  Preliminary Report (31 May 2024 07:25):    Testing in progress    Culture - Body Fluid with Gram Stain (collected 30 May 2024 13:54)  Source: Pleural Fl Pleural Fluid  Gram Stain (31 May 2024 03:18):    polymorphonuclear leukocytes seen    No organisms seen    by cytocentrifuge    Culture - Blood (collected 30 May 2024 02:13)  Source: .Blood Blood-Peripheral  Preliminary Report (31 May 2024 10:02):    No growth at 24 hours    Culture - Fungal, Body Fluid (collected 29 May 2024 17:00)  Source: .Body Fluid Other, Fluid  Preliminary Report (30 May 2024 09:28):    Testing in progress    Culture - Acid Fast - Body Fluid w/Smear (collected 29 May 2024 17:00)  Source: Pericardial Other, Pericardial Fluid    Culture - Body Fluid with Gram Stain (collected 29 May 2024 17:00)  Source: Pericardial Other, Pericardial Fluid  Gram Stain (30 May 2024 02:51):    polymorphonuclear leukocytes seen    No organisms seen    by cytocentrifuge  Preliminary Report (30 May 2024 17:44):    No growth        CARDIAC TESTING:  ECG:  	  Telemetry:  Echocardiogram: Normal LV function, small pericardial effusion compared to prior    OTHER DIAGNOSTIC TESTING:  CXR: Moderate L pleural effusion  CT: R lobe opacities   CCU Attending Note    ILEANA MCNEIL  MRN-[id number]    Date of Admission: 05-28-24    Brief HPI: 52 y/o female PMH HFpEF, SLE, antiphospholipid syndrome, RA, HTN, HLD, COPD who presents with pericardial effusion and cardiac tamponade. Course complicated by SLE exacerbation and PNA.     24 hr events:   NAEON, continues to have pericardial pain    acetaminophen     Tablet .. 650 milliGRAM(s) Oral every 6 hours PRN  albuterol    90 MICROgram(s) HFA Inhaler 2 Puff(s) Inhalation every 6 hours PRN  albuterol/ipratropium for Nebulization 3 milliLiter(s) Nebulizer every 12 hours  ALPRAZolam 1 milliGRAM(s) Oral four times a day  aluminum hydroxide/magnesium hydroxide/simethicone Suspension 30 milliLiter(s) Oral every 4 hours PRN  budesonide  80 MICROgram(s)/formoterol 4.5 MICROgram(s) Inhaler 2 Puff(s) Inhalation two times a day  chlorhexidine 2% Cloths 1 Application(s) Topical <User Schedule>  cholecalciferol 2000 Unit(s) Oral daily  colchicine 0.6 milliGRAM(s) Oral two times a day  dextrose 10% Bolus 125 milliLiter(s) IV Bolus once  dextrose 5%. 1000 milliLiter(s) IV Continuous <Continuous>  dextrose 5%. 1000 milliLiter(s) IV Continuous <Continuous>  dextrose 50% Injectable 25 Gram(s) IV Push once  dextrose 50% Injectable 12.5 Gram(s) IV Push once  dextrose Oral Gel 15 Gram(s) Oral once PRN  folic acid 1 milliGRAM(s) Oral daily  glucagon  Injectable 1 milliGRAM(s) IntraMuscular once  heparin   Injectable 6500 Unit(s) IV Push every 6 hours PRN  heparin   Injectable 3000 Unit(s) IV Push every 6 hours PRN  heparin  Infusion. 1000 Unit(s)/Hr IV Continuous <Continuous>  insulin lispro (ADMELOG) corrective regimen sliding scale   SubCutaneous three times a day before meals  melatonin 3 milliGRAM(s) Oral at bedtime PRN  methylPREDNISolone sodium succinate Injectable 60 milliGRAM(s) IV Push daily  morphine  IR 15 milliGRAM(s) Oral two times a day PRN  morphine ER Tablet 60 milliGRAM(s) Oral <User Schedule>  morphine ER Tablet 30 milliGRAM(s) Oral four times a day PRN  pantoprazole    Tablet 40 milliGRAM(s) Oral before breakfast  piperacillin/tazobactam IVPB.. 3.375 Gram(s) IV Intermittent every 8 hours  sodium chloride 3%  Inhalation 4 milliLiter(s) Inhalation every 12 hours  tiotropium 2.5 MICROgram(s) Inhaler 2 Puff(s) Inhalation daily  vancomycin  IVPB 1250 milliGRAM(s) IV Intermittent every 24 hours      PHYSICAL EXAM:  Vitals reviewed          ABP: --  ABP(mean): --  CVP(mm Hg): --  CVP(cm H2O): --  CO: --  CI: --  PA: --  PA(mean): --  PA(direct): --  PCWP: --  LA: --  RA: --  SVR: --  SVRI: --  PVR: --  PVRI: --    General Appearance: no acute distress	  Cardiovascular: regular rate and rhythm. No JVP  Respiratory: bilateral crackles, R>L  Gastrointestinal:  soft, non-tender  Skin/Integumen: no edema  Neurologic: non focal  Musculoskeletal/ extremities: warm  Vascular: Peripheral pulses palpable 2+ bilaterally    LABS: Reviewed        Culture - Fungal, Body Fluid (collected 30 May 2024 13:54)  Source: Pleural Fl Pleural Fluid  Preliminary Report (31 May 2024 07:25):    Testing in progress    Culture - Body Fluid with Gram Stain (collected 30 May 2024 13:54)  Source: Pleural Fl Pleural Fluid  Gram Stain (31 May 2024 03:18):    polymorphonuclear leukocytes seen    No organisms seen    by cytocentrifuge    Culture - Blood (collected 30 May 2024 02:13)  Source: .Blood Blood-Peripheral  Preliminary Report (31 May 2024 10:02):    No growth at 24 hours    Culture - Fungal, Body Fluid (collected 29 May 2024 17:00)  Source: .Body Fluid Other, Fluid  Preliminary Report (30 May 2024 09:28):    Testing in progress    Culture - Acid Fast - Body Fluid w/Smear (collected 29 May 2024 17:00)  Source: Pericardial Other, Pericardial Fluid    Culture - Body Fluid with Gram Stain (collected 29 May 2024 17:00)  Source: Pericardial Other, Pericardial Fluid  Gram Stain (30 May 2024 02:51):    polymorphonuclear leukocytes seen    No organisms seen    by cytocentrifuge  Preliminary Report (30 May 2024 17:44):    No growth        CARDIAC TESTING:  ECG:  	  Telemetry:  Echocardiogram: Normal LV function, small pericardial effusion compared to prior    OTHER DIAGNOSTIC TESTING:  CXR: Moderate L pleural effusion  CT: R lobe opacities

## 2024-06-03 NOTE — PROGRESS NOTE ADULT - ASSESSMENT
ASSESSMENT  54 y/o F PMHx  of HTN, HLD, COPD on home O2, SLE, lupus anticoagulant, antiphospholipid syndrome,  rheumatoid arthritis, cervical cancer sp hysterectomy,  laryngeal cancer s/p CT, RT, HFpEF (06/21/19 EF 63%) s/p AICD+PPM, DVT/PE (on lovenox QD),  NSTEMI in 2015, subclavian stenosis s/p stent placement presenting for shortness of breath and pleuritic chest pain. Pt reports on Wed she woke up with pleuritic chest pain and increased SOB. Pt with recent admission 1 week ago for Pneumonia, was DC from hospital on Sunday with oral Abx regimen.  ID consulted for fevers    ABX  ampicillin/sulbactam  IVPB 5/28-29  doxycycline IVPB 5/28-29  piperacillin/tazobactam IVPB.. 5/30-present  vancomycin  IVPB 5/30-present    IMPRESSION  #Fever + Pericarditis/tamponade & Pleural effusions, rule out infectious etiology   Tm 5/28 100.9, 5/29 101.5, 5/30 103  WBC peak 5/29 17--> 10     5/30 RVP (-)    Procalcitonin: 0.25 ng/mL (05-30-24 @ 15:53) unremarkable     s/p thoracentesis 5/30 WBC 1798; 51% NP, 39% lymph. CX NG     s/p pericardiocentesis 5/29 pericardial fluid NG ; WBC 2654; 51% NP    Previous admission coxsackie B titers pan-positive     MRSA PCR Result.: Negative (05-20-24 @ 23:15)    Legionella Antigen, Urine: Negative (05-20-24 @ 16:53)    Streptococcus pneumoniae Ag, Ur Result: Negative (05-20-24 @ 16:53)    Parvovirus negative     Coxsackie B titers , pan +     TSH wnl     Procalcitonin: 0.06 (05-20-24 @ 06:31)  Repeat CT Chest Since CT chest performed on May 30, 2024;  Bilateral lower lobe, right middle and lingular segment atelectasis.  Scattered patchy groundglass opacities with the right middle and lower lobes.  Prominent mediastinal lymph nodes, possibly reactive.  Trace left pleural effusions.  Decreased pericardial effusion, now trace volume.  Near-complete resolution of previously seen right pleural effusion.  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      RECOMMENDATIONS  - Doubt bacterial etiology. Prolonged symptoms , all bacterial cx NG. Possible viral, rule out autoimmune  - Can complete empiric 7 days of antibiotics , on zosyn end 6/6  - f/u repeat coxsackie titers (may be too early to really be helpful), previous admission coxsackie B titers were pan-positive, unclear picture  - adenovirus PCR,  histoplasma serum Ab, quantiferon gold   - Rheum consult  - repeat procalcitonin unremarkable      If any questions, please send a message or call on [employer] Teams  Please continue to update ID with any pertinent new laboratory, radiographic findings, or change in clinical status

## 2024-06-03 NOTE — PROGRESS NOTE ADULT - CRITICAL CARE ATTENDING COMMENT
I have personally seen and examined this patient.  I have reviewed all pertinent clinical information and reviewed all relevant imaging and diagnostic studies personally.   If possible, I counseled the patient about diagnostic testing and treatment plan.   I discussed my recommendations with the primary team and any family members at bedside.
This included management of respiratory function, adjustment of vasopressor support, optimization of nutrition and glycemic control, management of antibiotics, review and adjustment of fluid balances, management of pain and sedation, review of radiographs and imaging studies, and communication with consultants.
This included management of respiratory function, adjustment of vasopressor support, optimization of nutrition and glycemic control, management of antibiotics, review and adjustment of fluid balances, management of pain and sedation, review of radiographs and imaging studies, and communication with consultants.
This included management of respiratory function, adjustment of vasopressor support, optimization of nutrition and glycemic control, management of antibiotics, review and adjustment of fluid balances, management of pain and sedation, review of radiographs and imaging studies, and communication with consultants.    (Date of Service: 05/29/2024)

## 2024-06-03 NOTE — PROGRESS NOTE ADULT - SUBJECTIVE AND OBJECTIVE BOX
Future Appointments  Date Time Provider Department Center   5/1/2018 9:00 AM Guicho Lizarraga MD [facility]   5/31/2018 8:00 AM [facility] EMG PROCEDURAL LAB [facility] NEURO [facility]   6/6/2018 8:30 AM Aundrea Pathak MD [facility] NEURO [place] [facility]   6/6/2018 10:30 AM Aundrea Pathak MD [facility] NEURO [facility]   7/5/2018 10:00 AM Angelo Blas Jr., MD [facility]        04/26/18 0834   Final Note   Assessment Type Final Discharge Note   Discharge Disposition Home   What phone number can be called within the next 1-3 days to see how you are doing after discharge? [phone]   Hospital Follow Up  Appt(s) scheduled? Yes   Right Care Referral Info   Post Acute Recommendation No Care      ILEANA MCNEIL  53y, Female  Allergy: Vantin (Other (Mild))  Plaquenil (Other)  adhesives (Rash; Urticaria)  Avelox (Other)  Arava (Anaphylaxis; Angioedema)      LOS  6d    CHIEF COMPLAINT: Large pericardial effusion (03 Jun 2024 09:45)      INTERVAL EVENTS/HPI  - No acute events overnight, feels pain across her chest going into her shoulders/neck  - T(F): , Max: 96.9 (06-02-24 @ 20:00)  - Tolerating medication  - WBC Count: 13.20 (06-03-24 @ 04:33) downtrending   WBC Count: 18.02 (06-02-24 @ 04:38)     - Creatinine: 1.3 (06-03-24 @ 04:33)  Creatinine: 1.5 (06-02-24 @ 04:38)       ROS  General: Denies rigors, nightsweats  HEENT: Denies headache, rhinorrhea, sore throat, eye pain  CV: Denies CP, palpitations  PULM: Denies wheezing, hemoptysis  GI: Denies hematemesis, hematochezia, melena  : Denies discharge, hematuria  MSK: Denies arthralgias, myalgias  SKIN: Denies rash, lesions  NEURO: Denies paresthesias, weakness  PSYCH: Denies depression, anxiety    VITALS:  T(F): 96.2, Max: 96.9 (06-02-24 @ 20:00)  HR: 83  BP: 143/73  RR: 25Vital Signs Last 24 Hrs  T(C): 35.7 (03 Jun 2024 06:00), Max: 36.1 (02 Jun 2024 20:00)  T(F): 96.2 (03 Jun 2024 06:00), Max: 96.9 (02 Jun 2024 20:00)  HR: 83 (03 Jun 2024 12:00) (70 - 110)  BP: 143/73 (03 Jun 2024 12:00) (109/53 - 156/84)  BP(mean): 102 (03 Jun 2024 12:00) (77 - 113)  RR: 25 (03 Jun 2024 12:00) (12 - 66)  SpO2: 97% (03 Jun 2024 12:00) (93% - 99%)    Parameters below as of 03 Jun 2024 12:00  Patient On (Oxygen Delivery Method): nasal cannula  O2 Flow (L/min): 2      PHYSICAL EXAM:  Gen: NAD, resting in bed NC  HEENT: Normocephalic, atraumatic  Neck: supple, no lymphadenopathy  CV: Regular rate & regular rhythm  Lungs: decreased BS at bases, no fremitus  Abdomen: Soft, BS present  Ext: Warm, well perfused  Neuro: non focal, awake  Skin: no rash, no erythema  Lines: no phlebitis    FH: Non-contributory  Social Hx: Non-contributory    TESTS & MEASUREMENTS:                        10.4   13.20 )-----------( 329      ( 03 Jun 2024 04:33 )             33.5     06-03    140  |  103  |  24<H>  ----------------------------<  160<H>  3.9   |  24  |  1.3    Ca    9.0      03 Jun 2024 04:33  Mg     1.7     06-03    TPro  5.6<L>  /  Alb  3.2<L>  /  TBili  <0.2  /  DBili  x   /  AST  14  /  ALT  24  /  AlkPhos  135<H>  06-03      LIVER FUNCTIONS - ( 03 Jun 2024 04:33 )  Alb: 3.2 g/dL / Pro: 5.6 g/dL / ALK PHOS: 135 U/L / ALT: 24 U/L / AST: 14 U/L / GGT: x           Urinalysis Basic - ( 03 Jun 2024 04:33 )    Color: x / Appearance: x / SG: x / pH: x  Gluc: 160 mg/dL / Ketone: x  / Bili: x / Urobili: x   Blood: x / Protein: x / Nitrite: x   Leuk Esterase: x / RBC: x / WBC x   Sq Epi: x / Non Sq Epi: x / Bacteria: x        Culture - Blood (collected 06-01-24 @ 04:43)  Source: .Blood None  Preliminary Report (06-02-24 @ 17:01):    No growth at 24 hours    Culture - Blood (collected 05-31-24 @ 16:41)  Source: .Blood None  Preliminary Report (06-03-24 @ 01:02):    No growth at 48 Hours    Culture - Blood (collected 05-31-24 @ 11:40)  Source: .Blood None  Preliminary Report (06-02-24 @ 22:02):    No growth at 48 Hours    Culture - Blood (collected 05-31-24 @ 05:00)  Source: .Blood None  Preliminary Report (06-02-24 @ 14:01):    No growth at 48 Hours    Culture - Fungal, Body Fluid (collected 05-30-24 @ 13:54)  Source: Pleural Fl Pleural Fluid  Preliminary Report (06-01-24 @ 23:03):    Culture is being performed. Fungal cultures are held for 4 weeks.    Culture - Body Fluid with Gram Stain (collected 05-30-24 @ 13:54)  Source: Pleural Fl Pleural Fluid  Gram Stain (05-31-24 @ 03:18):    polymorphonuclear leukocytes seen    No organisms seen    by cytocentrifuge    Culture - Blood (collected 05-30-24 @ 02:13)  Source: .Blood Blood-Peripheral  Preliminary Report (06-03-24 @ 10:02):    No growth at 4 days    Culture - Fungal, Body Fluid (collected 05-29-24 @ 17:00)  Source: .Body Fluid Other, Fluid  Preliminary Report (06-01-24 @ 23:03):    Culture is being performed. Fungal cultures are held for 4 weeks.    Culture - Acid Fast - Body Fluid w/Smear (collected 05-29-24 @ 17:00)  Source: Pericardial Other, Pericardial Fluid  Preliminary Report (06-01-24 @ 23:08):    Culture is being performed.    Culture - Body Fluid with Gram Stain (collected 05-29-24 @ 17:00)  Source: Pericardial Other, Pericardial Fluid  Gram Stain (05-30-24 @ 02:51):    polymorphonuclear leukocytes seen    No organisms seen    by cytocentrifuge  Preliminary Report (05-30-24 @ 17:44):    No growth    Culture - Blood (collected 05-28-24 @ 16:11)  Source: .Blood Blood  Gram Stain (05-30-24 @ 19:04):    Growth in anaerobic bottle: Gram Positive Cocci in Clusters  Final Report (05-31-24 @ 15:03):    Growth in anaerobic bottle: Staphylococcus hominis    Isolation of Coagulase negative Staphylococcus from single blood culture    sets may represent    contamination. Contact the Microbiology Department at [phone] if    susceptibility testing is    clinically indicated.    Direct identification is available within approximately 3-5    hours either by Blood Panel Multiplexed PCR or Direct    MALDI-TOF. Details: [email or web address]  Organism: Blood Culture PCR (05-31-24 @ 15:03)  Organism: Blood Culture PCR (05-31-24 @ 15:03)      Method Type: PCR      -  Coagulase negative Staphylococcus: Detec    Culture - Blood (collected 05-28-24 @ 16:11)  Source: .Blood Blood  Final Report (06-02-24 @ 23:00):    No growth at 5 days    Culture - Urine (collected 05-28-24 @ 15:29)  Source: Clean Catch Clean Catch (Midstream)  Final Report (05-30-24 @ 09:29):    No growth    Culture - Stool (collected 05-23-24 @ 17:06)  Source: .Stool Feces  Final Report (05-25-24 @ 17:57):    No enteric pathogens isolated.    (Stool culture examined for Salmonella,    Shigella, Campylobacter, Aeromonas, Plesiomonas,    Vibrio, E.coli O157 and Yersinia)    Culture - Blood (collected 05-19-24 @ 19:59)  Source: .Blood Blood  Final Report (05-25-24 @ 07:00):    No growth at 5 days    Culture - Blood (collected 05-19-24 @ 19:59)  Source: .Blood Blood  Final Report (05-25-24 @ 07:00):    No growth at 5 days        Lactate, Blood: 0.7 mmol/L (05-31-24 @ 05:20)      INFECTIOUS DISEASES TESTING  MRSA PCR Result.: Negative (06-02-24 @ 08:45)  Vancomycin Level, Trough: 14.9 (06-01-24 @ 16:00)  Procalcitonin: 0.14 (05-31-24 @ 16:41)  Procalcitonin: 0.25 (05-30-24 @ 15:53)  Rapid RVP Result: NotDetec (05-30-24 @ 07:12)  HIV-1/2 Combo Result: Nonreact (05-23-24 @ 11:50)  MRSA PCR Result.: Negative (05-20-24 @ 23:15)  Legionella Antigen, Urine: Negative (05-20-24 @ 16:53)  Streptococcus pneumoniae Ag, Ur Result: Negative (05-20-24 @ 16:53)  Procalcitonin: 0.06 (05-20-24 @ 06:31)      INFLAMMATORY MARKERS  Sedimentation Rate, Erythrocyte: 72 mm/Hr (05-29-24 @ 04:43)  C-Reactive Protein: 157.0 mg/L (05-29-24 @ 04:43)  Sedimentation Rate, Erythrocyte: 71 mm/Hr (05-20-24 @ 11:30)  C-Reactive Protein: 87.5 mg/L (05-20-24 @ 11:30)      RADIOLOGY & ADDITIONAL TESTS:  I have personally reviewed the last available Chest xray  CXR      CT  CT Chest No Cont:   ACC: [id number] EXAM:  CT CHEST   ORDERED BY: MICKEY NAIK     PROCEDURE DATE:  06/01/2024          INTERPRETATION:  CLINICAL HISTORY / REASON FOR EXAM: Recurrent pleural   effusion.    TECHNIQUE: Multislice helical sections were obtained from the thoracic   inlet to the lung bases without intravenous contrast. Coronal, sagittal   and 3D/MIP reformatted images are also submitted.    CORRELATION: Chest radiograph from June 1, 2024.    FINDINGS:    TUBES/LINES: Pacing device within the subcutaneous tissues of the left   chest and leads terminating in the right atrium and right ventricle.    LUNGS, PLEURA, AND AIRWAYS: Small/trace left pleural effusion.   Atelectasis of the left lower lobe and lingular segment. Scattered patchy   groundglass opacities of the right lower lobe. Additional right middle   and lower lobe atelectasis. Central airways patent.    MEDIASTINUM/LYMPH NODES: Scattered prominent prevascular lower   paratracheal and left hilar lymph nodes.    HEART/GREAT VESSELS: Small/trace pericardial effusion. Heart size   unremarkable. No aneurysmal dilation of the thoracic aorta.    BONES/SOFT TISSUES: Unchanged.    VISUALIZED UPPER ABDOMEN: Unchanged.      IMPRESSION:    Since CT chest performed on May 30, 2024;    Bilateral lower lobe, right middle and lingular segment atelectasis.    Scattered patchy groundglass opacities with the right middle and lower   lobes.    Prominent mediastinal lymph nodes, possibly reactive.    Trace left pleural effusions.    Decreased pericardial effusion, now trace volume.    Near-complete resolution of previously seen right pleural effusion.    --- End of Report ---            LELE CARVALHO MD; Attending Radiologist  This document has been electronically signed. Jun 1 2024  5:11PM (06-01-24 @ 16:47)      CARDIOLOGY TESTING  12 Lead ECG:   Ventricular Rate 70 BPM    Atrial Rate 70 BPM    P-R Interval 184 ms    QRS Duration 84 ms    Q-T Interval 428 ms    QTC Calculation(Bazett) 462 ms    P Axis 51 degrees    R Axis 19 degrees    T Axis 15 degrees    Diagnosis Line Atrial-paced rhythm  Low voltage QRS  Nonspecific T wave abnormality  Abnormal ECG    Confirmed by Juan Antonio Melton ([id number]) on 6/2/2024 9:49:02 PM (06-02-24 @ 05:30)  12 Lead ECG:   Ventricular Rate 87 BPM    Atrial Rate 87 BPM    P-R Interval 156 ms    QRS Duration 80 ms    Q-T Interval 394 ms    QTC Calculation(Bazett) 474 ms    P Axis 71 degrees    R Axis 7 degrees    T Axis 3 degrees    Diagnosis Line Normal sinus rhythm  Low voltage QRS  Nonspecific T wave abnormality  Abnormal ECG    Confirmed by Juan Antonio Melton ([id number]) on 6/2/2024 9:49:38 PM (06-01-24 @ 06:06)      MEDICATIONS  albuterol/ipratropium for Nebulization 3 Nebulizer every 12 hours  ALPRAZolam 1 Oral four times a day  aspirin 650 Oral three times a day  budesonide  80 MICROgram(s)/formoterol 4.5 MICROgram(s) Inhaler 2 Inhalation two times a day  chlorhexidine 2% Cloths 1 Topical <User Schedule>  cholecalciferol 2000 Oral daily  colchicine 0.6 Oral two times a day  dextrose 10% Bolus 125 IV Bolus once  dextrose 5%. 1000 IV Continuous <Continuous>  dextrose 5%. 1000 IV Continuous <Continuous>  dextrose 50% Injectable 25 IV Push once  dextrose 50% Injectable 12.5 IV Push once  folic acid 1 Oral daily  glucagon  Injectable 1 IntraMuscular once  heparin  Infusion. 1000 IV Continuous <Continuous>  insulin lispro (ADMELOG) corrective regimen sliding scale  SubCutaneous three times a day before meals  magnesium sulfate  IVPB 2 IV Intermittent once  methylPREDNISolone sodium succinate Injectable 60 IV Push daily  morphine ER Tablet 60 Oral <User Schedule>  pantoprazole    Tablet 40 Oral before breakfast  piperacillin/tazobactam IVPB.. 3.375 IV Intermittent every 8 hours  sodium chloride 3%  Inhalation 4 Inhalation every 12 hours  tiotropium 2.5 MICROgram(s) Inhaler 2 Inhalation daily      WEIGHT  Weight (kg): 80.7 (05-29-24 @ 07:16)  Creatinine: 1.3 mg/dL (06-03-24 @ 04:33)      ANTIBIOTICS:  piperacillin/tazobactam IVPB.. 3.375 Gram(s) IV Intermittent every 8 hours      All available historical records have been reviewed

## 2024-06-04 LAB
ALBUMIN SERPL ELPH-MCNC: 3.8 G/DL — SIGNIFICANT CHANGE UP (ref 3.5–5.2)
ALP SERPL-CCNC: 93 U/L — SIGNIFICANT CHANGE UP (ref 30–115)
ALT FLD-CCNC: 20 U/L — SIGNIFICANT CHANGE UP (ref 0–41)
ANION GAP SERPL CALC-SCNC: 13 MMOL/L — SIGNIFICANT CHANGE UP (ref 7–14)
APTT BLD: 88.5 SEC — CRITICAL HIGH (ref 27–39.2)
APTT BLD: 93.3 SEC — CRITICAL HIGH (ref 27–39.2)
AST SERPL-CCNC: 11 U/L — SIGNIFICANT CHANGE UP (ref 0–41)
BASOPHILS # BLD AUTO: 0.03 K/UL — SIGNIFICANT CHANGE UP (ref 0–0.2)
BASOPHILS NFR BLD AUTO: 0.2 % — SIGNIFICANT CHANGE UP (ref 0–1)
BILIRUB SERPL-MCNC: <0.2 MG/DL — SIGNIFICANT CHANGE UP (ref 0.2–1.2)
BUN SERPL-MCNC: 22 MG/DL — HIGH (ref 10–20)
C DIFF GDH STL QL: NEGATIVE — SIGNIFICANT CHANGE UP
C DIFF GDH STL QL: SIGNIFICANT CHANGE UP
CALCIUM SERPL-MCNC: 9.6 MG/DL — SIGNIFICANT CHANGE UP (ref 8.4–10.5)
CHLORIDE SERPL-SCNC: 102 MMOL/L — SIGNIFICANT CHANGE UP (ref 98–110)
CO2 SERPL-SCNC: 25 MMOL/L — SIGNIFICANT CHANGE UP (ref 17–32)
CREAT SERPL-MCNC: 1.1 MG/DL — SIGNIFICANT CHANGE UP (ref 0.7–1.5)
CULTURE RESULTS: SIGNIFICANT CHANGE UP
CULTURE RESULTS: SIGNIFICANT CHANGE UP
CV B1 AB TITR FLD: HIGH
CV B2 AB TITR FLD: HIGH
CV B3 AB TITR FLD: HIGH
CV B4 AB TITR FLD: HIGH
CV B5 AB TITR FLD: HIGH
CV B6 AB TITR FLD: HIGH
EGFR: 60 ML/MIN/1.73M2 — SIGNIFICANT CHANGE UP
EOSINOPHIL # BLD AUTO: 0 K/UL — SIGNIFICANT CHANGE UP (ref 0–0.7)
EOSINOPHIL NFR BLD AUTO: 0 % — SIGNIFICANT CHANGE UP (ref 0–8)
GLUCOSE BLDC GLUCOMTR-MCNC: 130 MG/DL — HIGH (ref 70–99)
GLUCOSE BLDC GLUCOMTR-MCNC: 164 MG/DL — HIGH (ref 70–99)
GLUCOSE BLDC GLUCOMTR-MCNC: 176 MG/DL — HIGH (ref 70–99)
GLUCOSE BLDC GLUCOMTR-MCNC: 227 MG/DL — HIGH (ref 70–99)
GLUCOSE SERPL-MCNC: 120 MG/DL — HIGH (ref 70–99)
HCT VFR BLD CALC: 32.9 % — LOW (ref 37–47)
HGB BLD-MCNC: 10.7 G/DL — LOW (ref 12–16)
IMM GRANULOCYTES NFR BLD AUTO: 1.9 % — HIGH (ref 0.1–0.3)
LYMPHOCYTES # BLD AUTO: 14.9 % — LOW (ref 20.5–51.1)
LYMPHOCYTES # BLD AUTO: 2.52 K/UL — SIGNIFICANT CHANGE UP (ref 1.2–3.4)
MAGNESIUM SERPL-MCNC: 2.3 MG/DL — SIGNIFICANT CHANGE UP (ref 1.8–2.4)
MCHC RBC-ENTMCNC: 29.4 PG — SIGNIFICANT CHANGE UP (ref 27–31)
MCHC RBC-ENTMCNC: 32.5 G/DL — SIGNIFICANT CHANGE UP (ref 32–37)
MCV RBC AUTO: 90.4 FL — SIGNIFICANT CHANGE UP (ref 81–99)
MONOCYTES # BLD AUTO: 0.85 K/UL — HIGH (ref 0.1–0.6)
MONOCYTES NFR BLD AUTO: 5 % — SIGNIFICANT CHANGE UP (ref 1.7–9.3)
NEUTROPHILS # BLD AUTO: 13.21 K/UL — HIGH (ref 1.4–6.5)
NEUTROPHILS NFR BLD AUTO: 78 % — HIGH (ref 42.2–75.2)
NON-GYNECOLOGICAL CYTOLOGY STUDY: SIGNIFICANT CHANGE UP
NON-GYNECOLOGICAL CYTOLOGY STUDY: SIGNIFICANT CHANGE UP
NRBC # BLD: 0 /100 WBCS — SIGNIFICANT CHANGE UP (ref 0–0)
PLATELET # BLD AUTO: 403 K/UL — HIGH (ref 130–400)
PMV BLD: 10.6 FL — HIGH (ref 7.4–10.4)
POTASSIUM SERPL-MCNC: 4.8 MMOL/L — SIGNIFICANT CHANGE UP (ref 3.5–5)
POTASSIUM SERPL-SCNC: 4.8 MMOL/L — SIGNIFICANT CHANGE UP (ref 3.5–5)
PROT SERPL-MCNC: 6.4 G/DL — SIGNIFICANT CHANGE UP (ref 6–8)
RBC # BLD: 3.64 M/UL — LOW (ref 4.2–5.4)
RBC # FLD: 13.6 % — SIGNIFICANT CHANGE UP (ref 11.5–14.5)
SODIUM SERPL-SCNC: 140 MMOL/L — SIGNIFICANT CHANGE UP (ref 135–146)
SPECIMEN SOURCE: SIGNIFICANT CHANGE UP
SPECIMEN SOURCE: SIGNIFICANT CHANGE UP
WBC # BLD: 16.93 K/UL — HIGH (ref 4.8–10.8)
WBC # FLD AUTO: 16.93 K/UL — HIGH (ref 4.8–10.8)

## 2024-06-04 PROCEDURE — 99232 SBSQ HOSP IP/OBS MODERATE 35: CPT

## 2024-06-04 PROCEDURE — 99233 SBSQ HOSP IP/OBS HIGH 50: CPT

## 2024-06-04 PROCEDURE — 71045 X-RAY EXAM CHEST 1 VIEW: CPT | Mod: 26

## 2024-06-04 PROCEDURE — 93010 ELECTROCARDIOGRAM REPORT: CPT

## 2024-06-04 PROCEDURE — 93010 ELECTROCARDIOGRAM REPORT: CPT | Mod: 77

## 2024-06-04 RX ORDER — IPRATROPIUM/ALBUTEROL SULFATE 18-103MCG
3 AEROSOL WITH ADAPTER (GRAM) INHALATION EVERY 8 HOURS
Refills: 0 | Status: DISCONTINUED | OUTPATIENT
Start: 2024-06-04 | End: 2024-06-07

## 2024-06-04 RX ORDER — FAMOTIDINE 10 MG/ML
20 INJECTION INTRAVENOUS DAILY
Refills: 0 | Status: DISCONTINUED | OUTPATIENT
Start: 2024-06-04 | End: 2024-06-07

## 2024-06-04 RX ADMIN — MORPHINE SULFATE 30 MILLIGRAM(S): 50 CAPSULE, EXTENDED RELEASE ORAL at 04:30

## 2024-06-04 RX ADMIN — FAMOTIDINE 20 MILLIGRAM(S): 10 INJECTION INTRAVENOUS at 11:20

## 2024-06-04 RX ADMIN — MORPHINE SULFATE 30 MILLIGRAM(S): 50 CAPSULE, EXTENDED RELEASE ORAL at 14:57

## 2024-06-04 RX ADMIN — PIPERACILLIN AND TAZOBACTAM 25 GRAM(S): 4; .5 INJECTION, POWDER, LYOPHILIZED, FOR SOLUTION INTRAVENOUS at 06:26

## 2024-06-04 RX ADMIN — Medication 1 MILLIGRAM(S): at 11:20

## 2024-06-04 RX ADMIN — PANTOPRAZOLE SODIUM 40 MILLIGRAM(S): 20 TABLET, DELAYED RELEASE ORAL at 06:27

## 2024-06-04 RX ADMIN — Medication 1: at 11:23

## 2024-06-04 RX ADMIN — Medication 2000 UNIT(S): at 11:20

## 2024-06-04 RX ADMIN — CHLORHEXIDINE GLUCONATE 1 APPLICATION(S): 213 SOLUTION TOPICAL at 06:27

## 2024-06-04 RX ADMIN — Medication 60 MILLIGRAM(S): at 06:27

## 2024-06-04 RX ADMIN — HEPARIN SODIUM 1000 UNIT(S)/HR: 5000 INJECTION INTRAVENOUS; SUBCUTANEOUS at 19:18

## 2024-06-04 RX ADMIN — Medication 2: at 17:17

## 2024-06-04 RX ADMIN — Medication 0.6 MILLIGRAM(S): at 06:27

## 2024-06-04 RX ADMIN — Medication 1 MILLIGRAM(S): at 17:18

## 2024-06-04 RX ADMIN — Medication 650 MILLIGRAM(S): at 06:26

## 2024-06-04 RX ADMIN — MORPHINE SULFATE 30 MILLIGRAM(S): 50 CAPSULE, EXTENDED RELEASE ORAL at 09:45

## 2024-06-04 RX ADMIN — Medication 3 MILLILITER(S): at 16:06

## 2024-06-04 RX ADMIN — MORPHINE SULFATE 30 MILLIGRAM(S): 50 CAPSULE, EXTENDED RELEASE ORAL at 14:27

## 2024-06-04 RX ADMIN — MORPHINE SULFATE 60 MILLIGRAM(S): 50 CAPSULE, EXTENDED RELEASE ORAL at 08:59

## 2024-06-04 RX ADMIN — MORPHINE SULFATE 60 MILLIGRAM(S): 50 CAPSULE, EXTENDED RELEASE ORAL at 09:45

## 2024-06-04 RX ADMIN — MORPHINE SULFATE 30 MILLIGRAM(S): 50 CAPSULE, EXTENDED RELEASE ORAL at 19:00

## 2024-06-04 RX ADMIN — HEPARIN SODIUM 1000 UNIT(S)/HR: 5000 INJECTION INTRAVENOUS; SUBCUTANEOUS at 06:23

## 2024-06-04 RX ADMIN — Medication 3 MILLILITER(S): at 08:13

## 2024-06-04 RX ADMIN — PIPERACILLIN AND TAZOBACTAM 25 GRAM(S): 4; .5 INJECTION, POWDER, LYOPHILIZED, FOR SOLUTION INTRAVENOUS at 22:13

## 2024-06-04 RX ADMIN — Medication 0.6 MILLIGRAM(S): at 17:18

## 2024-06-04 RX ADMIN — Medication 1 MILLIGRAM(S): at 00:30

## 2024-06-04 RX ADMIN — MORPHINE SULFATE 30 MILLIGRAM(S): 50 CAPSULE, EXTENDED RELEASE ORAL at 08:46

## 2024-06-04 RX ADMIN — HEPARIN SODIUM 1000 UNIT(S)/HR: 5000 INJECTION INTRAVENOUS; SUBCUTANEOUS at 07:06

## 2024-06-04 RX ADMIN — MORPHINE SULFATE 15 MILLIGRAM(S): 50 CAPSULE, EXTENDED RELEASE ORAL at 23:15

## 2024-06-04 RX ADMIN — MORPHINE SULFATE 15 MILLIGRAM(S): 50 CAPSULE, EXTENDED RELEASE ORAL at 15:47

## 2024-06-04 RX ADMIN — HEPARIN SODIUM 1000 UNIT(S)/HR: 5000 INJECTION INTRAVENOUS; SUBCUTANEOUS at 08:44

## 2024-06-04 RX ADMIN — MORPHINE SULFATE 15 MILLIGRAM(S): 50 CAPSULE, EXTENDED RELEASE ORAL at 22:42

## 2024-06-04 RX ADMIN — PIPERACILLIN AND TAZOBACTAM 25 GRAM(S): 4; .5 INJECTION, POWDER, LYOPHILIZED, FOR SOLUTION INTRAVENOUS at 13:27

## 2024-06-04 RX ADMIN — Medication 1 MILLIGRAM(S): at 06:27

## 2024-06-04 RX ADMIN — MORPHINE SULFATE 15 MILLIGRAM(S): 50 CAPSULE, EXTENDED RELEASE ORAL at 15:17

## 2024-06-04 RX ADMIN — TIOTROPIUM BROMIDE 2 PUFF(S): 18 CAPSULE ORAL; RESPIRATORY (INHALATION) at 08:13

## 2024-06-04 RX ADMIN — MORPHINE SULFATE 30 MILLIGRAM(S): 50 CAPSULE, EXTENDED RELEASE ORAL at 18:30

## 2024-06-04 NOTE — PROGRESS NOTE ADULT - SUBJECTIVE AND OBJECTIVE BOX
Over Night Events: events noted, feels better, on RA, cough    PHYSICAL EXAM    ICU Vital Signs Last 24 Hrs  T(C): 36.7 (04 Jun 2024 05:00), Max: 36.7 (04 Jun 2024 05:00)  T(F): 98.1 (04 Jun 2024 05:00), Max: 98.1 (04 Jun 2024 05:00)  HR: 74 (04 Jun 2024 07:30) (74 - 102)  BP: 147/85 (04 Jun 2024 05:00) (119/56 - 156/84)  BP(mean): 94 (03 Jun 2024 17:00) (80 - 113)  RR: 19 (04 Jun 2024 05:00) (16 - 29)  SpO2: 94% (04 Jun 2024 07:30) (94% - 99%)    O2 Parameters below as of 04 Jun 2024 07:30  Patient On (Oxygen Delivery Method): nasal cannula  O2 Flow (L/min): 2          General: ill looking  Lungs: Bilateral wheezing  Cardiovascular: Regular   Abdomen: Soft, Positive BS  Extremities: No clubbing   Neurological: Non focal       06-03-24 @ 07:01  -  06-04-24 @ 07:00  --------------------------------------------------------  IN:    Heparin Infusion: 100 mL    IV PiggyBack: 200 mL    Oral Fluid: 170 mL  Total IN: 470 mL    OUT:  Total OUT: 0 mL    Total NET: 470 mL          LABS:                          10.4   13.20 )-----------( 329      ( 03 Jun 2024 04:33 )             33.5                                               06-03    140  |  103  |  24<H>  ----------------------------<  160<H>  3.9   |  24  |  1.3    Ca    9.0      03 Jun 2024 04:33  Mg     1.7     06-03    TPro  5.6<L>  /  Alb  3.2<L>  /  TBili  <0.2  /  DBili  x   /  AST  14  /  ALT  24  /  AlkPhos  135<H>  06-03      PT/INR - ( 03 Jun 2024 04:33 )   PT: 10.90 sec;   INR: 0.96 ratio         PTT - ( 04 Jun 2024 04:40 )  PTT:93.3 sec                                       Urinalysis Basic - ( 03 Jun 2024 04:33 )    Color: x / Appearance: x / SG: x / pH: x  Gluc: 160 mg/dL / Ketone: x  / Bili: x / Urobili: x   Blood: x / Protein: x / Nitrite: x   Leuk Esterase: x / RBC: x / WBC x   Sq Epi: x / Non Sq Epi: x / Bacteria: x                                                  LIVER FUNCTIONS - ( 03 Jun 2024 04:33 )  Alb: 3.2 g/dL / Pro: 5.6 g/dL / ALK PHOS: 135 U/L / ALT: 24 U/L / AST: 14 U/L / GGT: x                                                                                                                                       MEDICATIONS  (STANDING):  albuterol/ipratropium for Nebulization 3 milliLiter(s) Nebulizer every 12 hours  ALPRAZolam 1 milliGRAM(s) Oral four times a day  aspirin 650 milliGRAM(s) Oral three times a day  budesonide  80 MICROgram(s)/formoterol 4.5 MICROgram(s) Inhaler 2 Puff(s) Inhalation two times a day  chlorhexidine 2% Cloths 1 Application(s) Topical <User Schedule>  cholecalciferol 2000 Unit(s) Oral daily  colchicine 0.6 milliGRAM(s) Oral two times a day  dextrose 10% Bolus 125 milliLiter(s) IV Bolus once  dextrose 5%. 1000 milliLiter(s) (100 mL/Hr) IV Continuous <Continuous>  dextrose 5%. 1000 milliLiter(s) (50 mL/Hr) IV Continuous <Continuous>  dextrose 50% Injectable 12.5 Gram(s) IV Push once  dextrose 50% Injectable 25 Gram(s) IV Push once  folic acid 1 milliGRAM(s) Oral daily  glucagon  Injectable 1 milliGRAM(s) IntraMuscular once  heparin  Infusion. 1000 Unit(s)/Hr (10 mL/Hr) IV Continuous <Continuous>  insulin lispro (ADMELOG) corrective regimen sliding scale   SubCutaneous three times a day before meals  methylPREDNISolone sodium succinate Injectable 60 milliGRAM(s) IV Push daily  morphine ER Tablet 60 milliGRAM(s) Oral <User Schedule>  pantoprazole    Tablet 40 milliGRAM(s) Oral before breakfast  piperacillin/tazobactam IVPB.. 3.375 Gram(s) IV Intermittent every 8 hours  sodium chloride 3%  Inhalation 4 milliLiter(s) Inhalation every 12 hours  tiotropium 2.5 MICROgram(s) Inhaler 2 Puff(s) Inhalation daily    MEDICATIONS  (PRN):  acetaminophen     Tablet .. 650 milliGRAM(s) Oral every 6 hours PRN Temp greater or equal to 38C (100.4F), Severe Pain (7 - 10)  albuterol    90 MICROgram(s) HFA Inhaler 2 Puff(s) Inhalation every 6 hours PRN Shortness of Breath and/or Wheezing  aluminum hydroxide/magnesium hydroxide/simethicone Suspension 30 milliLiter(s) Oral every 4 hours PRN Dyspepsia  dextrose Oral Gel 15 Gram(s) Oral once PRN Blood Glucose LESS THAN 70 milliGRAM(s)/deciliter  heparin   Injectable 6500 Unit(s) IV Push every 6 hours PRN For aPTT less than 40  heparin   Injectable 3000 Unit(s) IV Push every 6 hours PRN For aPTT between 40 - 57  melatonin 3 milliGRAM(s) Oral at bedtime PRN Insomnia  morphine  IR 15 milliGRAM(s) Oral two times a day PRN Severe Pain (7 - 10)  morphine ER Tablet 30 milliGRAM(s) Oral four times a day PRN severe pain

## 2024-06-04 NOTE — PROGRESS NOTE ADULT - ASSESSMENT
Impression:     Left pleural effusion - moderate sp thora  Large pericardial effusion s/p pericardiocentesis   Left lung atelectasis improved  Acute hypoxemic respiratory failure improved  History of COPD   SLE, lupus AC, APLS on AC   History of cervical and laryngeal cancer   TIAN     Plan:     S/P thoracentesis/ cx/ cyto noted  repeat ct scan reviewed  PPI  IWean down Fio2  now 94 on RA   Solumedrol 60 q 24  full AC  Rheumato fup  Chest PT   Neb q 6 h

## 2024-06-04 NOTE — PROGRESS NOTE ADULT - SUBJECTIVE AND OBJECTIVE BOX
CHIEF COMPLAINT:Patient is a 53y old Female who presents with a chief complaint of Large pericardial effusion (04 Jun 2024 08:19)    Primary diagnosis of Acute dyspnea        OVERNIGHT EVENTS: No events    INTERVAL HPI: No new complaints    PHYSICAL EXAMINATION:   CONSTITUTIONAL: Not in acute distress  NEUROLOGICAL: Alert and oriented x 3  EYES: Pupils equal, Round and reactive to light.  CARDIAC: Normal rate, Regular rhythm.    RESPIRATORY: No wheezing, No crackles, Normal chest expansion, Not tachypneic, No use of accessory muscles  GASTROINTESTINAL: Abdomen soft, Non-tender, No guarding, + BS  SKIN: Skin intact  EXTREMITIES:  2+ Peripheral Pulses, No clubbing, cyanosis, or edema    PAST MEDICAL & SURGICAL HISTORY  Lupus    RA (rheumatoid arthritis)    HTN (hypertension)    CHF (congestive heart failure)    COPD (chronic obstructive pulmonary disease)    DVT (deep venous thrombosis)    Pulmonary embolism    History of laryngeal cancer    GERD (gastroesophageal reflux disease)    Cervical cancer    APS (antiphospholipid syndrome)    S/P appendectomy    S/P cholecystectomy    AICD (automatic cardioverter/defibrillator) present  [employer]    S/P hysterectomy    History of back surgery    H/O foot surgery    S/P eye surgery    Subclavian arterial stenosis      SOCIAL HISTORY:  Social History:      ALLERGIES:  Vantin (Other (Mild))  Plaquenil (Other)  adhesives (Rash; Urticaria)  Avelox (Other)  Arava (Anaphylaxis; Angioedema)      VITALS:   T(F): 98.1  HR: 74  BP: 147/85  RR: 19  SpO2: 94%    LABS:                        10.7   16.93 )-----------( 403      ( 04 Jun 2024 07:48 )             32.9     06-04    140  |  102  |  22<H>  ----------------------------<  120<H>  4.8   |  25  |  1.1    Ca    9.6      04 Jun 2024 07:48  Mg     2.3     06-04    TPro  6.4  /  Alb  3.8  /  TBili  <0.2  /  DBili  x   /  AST  11  /  ALT  20  /  AlkPhos  93  06-04    PT/INR - ( 03 Jun 2024 04:33 )   PT: 10.90 sec;   INR: 0.96 ratio         PTT - ( 04 Jun 2024 07:48 )  PTT:88.5 sec  Urinalysis Basic - ( 04 Jun 2024 07:48 )    Color: x / Appearance: x / SG: x / pH: x  Gluc: 120 mg/dL / Ketone: x  / Bili: x / Urobili: x   Blood: x / Protein: x / Nitrite: x   Leuk Esterase: x / RBC: x / WBC x   Sq Epi: x / Non Sq Epi: x / Bacteria: x                  HOME MEDICATIONS:  albuterol 90 mcg/inh inhalation aerosol: 2 puff(s) inhaled every 6 hours, As Needed  cholecalciferol 125 mcg (5000 intl units) oral tablet: 1 tab(s) orally once a day  cyanocobalamin 1000 mcg/mL injectable solution: 1 milliliter(s) injectable once a week  folic acid 1 mg oral tablet: 1 tab(s) orally once a day  Lovenox 100 mg/mL injectable solution: 110 milligram(s) subcutaneously once a day (at bedtime)  Metoprolol Succinate  mg oral tablet, extended release: 1 tab(s) orally once a day  morphine 15 mg oral capsule: 1 tab(s) orally 2 times a day as needed for  severe pain  MS Contin 30 mg oral tablet, extended release: 1 tab(s) orally 7 times a day  Trelegy Ellipta 100 mcg-62.5 mcg-25 mcg/inh inhalation powder: 1 puff(s) inhaled once a day  Xanax 1 mg oral tablet: 1 tab(s) orally 4 times a day      MEDICATIONS:  STANDING MEDICATIONS  albuterol/ipratropium for Nebulization 3 milliLiter(s) Nebulizer every 12 hours  ALPRAZolam 1 milliGRAM(s) Oral four times a day  aspirin 650 milliGRAM(s) Oral three times a day  budesonide  80 MICROgram(s)/formoterol 4.5 MICROgram(s) Inhaler 2 Puff(s) Inhalation two times a day  chlorhexidine 2% Cloths 1 Application(s) Topical <User Schedule>  cholecalciferol 2000 Unit(s) Oral daily  colchicine 0.6 milliGRAM(s) Oral two times a day  dextrose 10% Bolus 125 milliLiter(s) IV Bolus once  dextrose 5%. 1000 milliLiter(s) IV Continuous <Continuous>  dextrose 5%. 1000 milliLiter(s) IV Continuous <Continuous>  dextrose 50% Injectable 25 Gram(s) IV Push once  dextrose 50% Injectable 12.5 Gram(s) IV Push once  folic acid 1 milliGRAM(s) Oral daily  glucagon  Injectable 1 milliGRAM(s) IntraMuscular once  heparin  Infusion. 1000 Unit(s)/Hr IV Continuous <Continuous>  insulin lispro (ADMELOG) corrective regimen sliding scale   SubCutaneous three times a day before meals  methylPREDNISolone sodium succinate Injectable 60 milliGRAM(s) IV Push daily  morphine ER Tablet 60 milliGRAM(s) Oral <User Schedule>  pantoprazole    Tablet 40 milliGRAM(s) Oral before breakfast  piperacillin/tazobactam IVPB.. 3.375 Gram(s) IV Intermittent every 8 hours  sodium chloride 3%  Inhalation 4 milliLiter(s) Inhalation every 12 hours  tiotropium 2.5 MICROgram(s) Inhaler 2 Puff(s) Inhalation daily    PRN MEDICATIONS  acetaminophen     Tablet .. 650 milliGRAM(s) Oral every 6 hours PRN  albuterol    90 MICROgram(s) HFA Inhaler 2 Puff(s) Inhalation every 6 hours PRN  aluminum hydroxide/magnesium hydroxide/simethicone Suspension 30 milliLiter(s) Oral every 4 hours PRN  dextrose Oral Gel 15 Gram(s) Oral once PRN  famotidine    Tablet 20 milliGRAM(s) Oral daily PRN  heparin   Injectable 6500 Unit(s) IV Push every 6 hours PRN  heparin   Injectable 3000 Unit(s) IV Push every 6 hours PRN  melatonin 3 milliGRAM(s) Oral at bedtime PRN  morphine  IR 15 milliGRAM(s) Oral two times a day PRN  morphine ER Tablet 30 milliGRAM(s) Oral four times a day PRN

## 2024-06-04 NOTE — PROGRESS NOTE ADULT - ATTENDING COMMENTS
52 y/o F PMHx  of HTN, HLD, COPD on home O2, SLE, lupus anticoagulant, antiphospholipid syndrome,  rheumatoid arthritis, cervical cancer sp hysterectomy,  laryngeal cancer s/p CT, RT, HFpEF (06/21/19 EF 63%) s/p AICD+PPM, DVT/PE (on lovenox QD),  NSTEMI in 2015, subclavian stenosis s/p stent placement presenting for shortness of breath and pleuritic chest pain. Pt reports on Wed she woke up with pleuritic chest pain and increased SOB. Pt with recent admission 1 week ago for Pneumonia, was DC from hospital on Sunday with oral Abx regimen. States she did not feel well at home, continued to have SOB, intermittent fevers, and increasing need for O2 so she came back to the hospital.    # Large pericardial effusion, early tamponade physiology present based on bedside ECHO  # HFpEF  # Hx of Torsades  # NSTEMI in 2015  - Underwent successful pericardiocentesis on 5/29/24, s/p pericardial drain with 950 cc bloody output on 5/29. drain output 40cc since insertion, drain removed 5/30.   - Echo with normal function and small pericardial effusion.  - Continue colchicine 0.6 mg BID for at least 3 months  - High dose  mg TID for at least the next week for pericardial pain was advised by Cardio, but patient does not want it d/t her h/o vascular/GI bleeds. Will only do colchicine as above. f/u Cardio.     # Left pleural effusion moderate s/p thoracentesis  # Left lung atelectasis, improved  # Hx COPD on home O2  - s/p thoracentesis 650 cc cloudy fluid drained  - Currently saturating 94% on RA  - c/w solumedrol 60 q24  - c/w spiriva and symbicort  - Nebs q6  - Chest PT    # Anti-phospholipid syndrome  # Extensive history of DVTs, recent in stent (subclavian vein) thrombosis  # Subcutaneous hematomas and bruising due to supratherapeutic INR  # Cerebral hemorrhage from Coumadin  - On Lovenox 110 mg sc daily at home  - Continue with heparin drip for now     # Chronic morphine for pain   - Pain control per home meds.   - c/w morphine IR 15 mg q12  - c/w morphine ER 30 mg q6    # Fever with Pericarditis and Pleural effusion , r/o infectious etiology  - Infectious workup so far has been negative  - Blood culture, procalcitonin, MRSA, Legionella, Strep, Parvovirus, Coxsackie : Negative  - complete empiric 7 days of antibiotics , on zosyn end 6/6  - f/u repeat coxsackie, histoplasma, adenovirus, quantiferon    Miscellaneous:  DVT prophylaxis: Heparin Drip  Bowel  - Feeds: DASH/TLC  - Prophylaxis: Pantoprazole  - Regimen: None  Code status: Full

## 2024-06-04 NOTE — PROGRESS NOTE ADULT - ASSESSMENT
# Large pericardial effusion, early tamponade physiology present based on bedside ECHO  - Underwent successful pericardiocentesis on 5/29/24, s/p pericardial drain with 950 cc bloody output on 5/29. drain output 40cc since insertion, drain removed 5/30.   - Echo with normal function and small pericardial effusion.  - Continue colchicine 0.6 mg BID for at least 3 months  - High dose ASA, 650 mg TID for at least the next week for pericardial pain    # Left pleural effusion s/p thoracentesis    # HFpEF  #SLE/APLS/RA  #Hx DVT/PE on lovenox  #Hx subclavian stenosis  #Hx COPD on home O2  #Hx of torsades  #NSTEMI in 2015  #chronic morphine for pain   - Pain control per home meds.   - HOB @ 45. Monitor Pulse Ox. Keep 88-92% on HFNC 60/60, Bipap at night and prn for desat. s/p left thoracentesis 650 cloudy fluid, f/u pleural fluid tests.  - 6/1 ID consult for PNA and completing empiric treatment for 7 days, EOD 6/3, 6/2/24 satting well on RA  - pleuritic chest pain, trop -ve*2, ekg with low voltage, no ischemic changes on ekg, no diffuse ST elevations to indicate pericarditis.   -  Large pericardial effusion. Early tamponade physiology based on bedside ECHO. C/w colchicine. IVC collapsable. Goal directed fluid resuscitation. S/p pericardial drain with 950 cc bloody output on 5/29. drain output 40cc since insertion, drain removed 5/30.   - Repeat TTE showing no reaccumulation of effusion.   -6/1 Continue colchicine 0.6 mg BID for at least 3 months. CT chest to evaluate effusion.  - Abx broadened to vanc zosyn. repeat infectious work up sent.  Ua negative.  FU BCx. Ucx. GI PCR. f/u ID recs.   - dc vanc 2/2 TIAN   - heparin ggt  - Solumedrol 60mg Qday, pending official rheumatology note. Etiology of fevers most likely autoimmune.   -6/1 Rheumatology consult for SLE exacerbation f/u   - Monitor FS. Insulin protocol if needed FS goal 140-180  -  Ambulate as tolerated      # Large pericardial effusion, early tamponade physiology present based on bedside ECHO  # HFpEF  # Hx of Torsades  # NSTEMI in 2015  - Underwent successful pericardiocentesis on 5/29/24, s/p pericardial drain with 950 cc bloody output on 5/29. drain output 40cc since insertion, drain removed 5/30.   - Echo with normal function and small pericardial effusion.  - Continue colchicine 0.6 mg BID for at least 3 months  - High dose ASA, 650 mg TID for at least the next week for pericardial pain    # Left pleural effusion moderate s/p thoracentesis  # Left lung atelectasis, improved  # Hx COPD on home O2  - s/p thoracentesis 650 cc cloudy fluid drained  - Currently saturating 94% on RA  - c/w solumedrol 60 q24  - c/w spiriva and symbicort  - Nebs q6  - Chest PT    # Anti-phospholipid syndrome  # Extensive history of DVTs, recent in stent (subclavian vein) thrombosis  # Subcutaneous hematomas and bruising due to supratherapeutic INR  # Cerebral hemorrhage from Coumadin  - On Lovenox 110 mg sc daily at home  - Continue with heparin drip    # Chronic morphine for pain   - Pain control per home meds.   - c/w morphine IR 15 mg q12  - c/w morphine ER 30 mg q6    # Fever with Pericarditis and Pleural effusion , r/o infectious etiology  - Infectious workup so far has been negative  - Blood culture, procalcitonin, MRSA, Legionella, Strep, Parvovirus, Coxsackie : Negative  - complete empiric 7 days of antibiotics , on zosyn end 6/6  - f/u repeat coxsackie, histoplasma, adenovirus, quantiferon    Miscellaneous:  DVT prophylaxis: Heparin Drip  Bowel  - Feeds: DASH/TLC  - Prophylaxis: Pantoprazole  - Regimen: None  Code status: Full

## 2024-06-05 ENCOUNTER — RESULT REVIEW (OUTPATIENT)
Age: 54
End: 2024-06-05

## 2024-06-05 LAB
ALBUMIN SERPL ELPH-MCNC: 3.7 G/DL — SIGNIFICANT CHANGE UP (ref 3.5–5.2)
ALP SERPL-CCNC: 116 U/L — HIGH (ref 30–115)
ALT FLD-CCNC: 21 U/L — SIGNIFICANT CHANGE UP (ref 0–41)
ANION GAP SERPL CALC-SCNC: 14 MMOL/L — SIGNIFICANT CHANGE UP (ref 7–14)
APTT BLD: 89.4 SEC — CRITICAL HIGH (ref 27–39.2)
AST SERPL-CCNC: 13 U/L — SIGNIFICANT CHANGE UP (ref 0–41)
BASOPHILS # BLD AUTO: 0.02 K/UL — SIGNIFICANT CHANGE UP (ref 0–0.2)
BASOPHILS NFR BLD AUTO: 0.1 % — SIGNIFICANT CHANGE UP (ref 0–1)
BILIRUB SERPL-MCNC: <0.2 MG/DL — SIGNIFICANT CHANGE UP (ref 0.2–1.2)
BUN SERPL-MCNC: 27 MG/DL — HIGH (ref 10–20)
CALCIUM SERPL-MCNC: 9.6 MG/DL — SIGNIFICANT CHANGE UP (ref 8.4–10.5)
CHLORIDE SERPL-SCNC: 100 MMOL/L — SIGNIFICANT CHANGE UP (ref 98–110)
CO2 SERPL-SCNC: 24 MMOL/L — SIGNIFICANT CHANGE UP (ref 17–32)
CREAT SERPL-MCNC: 1.3 MG/DL — SIGNIFICANT CHANGE UP (ref 0.7–1.5)
CULTURE RESULTS: SIGNIFICANT CHANGE UP
CULTURE RESULTS: SIGNIFICANT CHANGE UP
EGFR: 49 ML/MIN/1.73M2 — LOW
EOSINOPHIL # BLD AUTO: 0.01 K/UL — SIGNIFICANT CHANGE UP (ref 0–0.7)
EOSINOPHIL NFR BLD AUTO: 0.1 % — SIGNIFICANT CHANGE UP (ref 0–8)
GLUCOSE BLDC GLUCOMTR-MCNC: 152 MG/DL — HIGH (ref 70–99)
GLUCOSE BLDC GLUCOMTR-MCNC: 191 MG/DL — HIGH (ref 70–99)
GLUCOSE BLDC GLUCOMTR-MCNC: 192 MG/DL — HIGH (ref 70–99)
GLUCOSE BLDC GLUCOMTR-MCNC: 205 MG/DL — HIGH (ref 70–99)
GLUCOSE BLDC GLUCOMTR-MCNC: 216 MG/DL — HIGH (ref 70–99)
GLUCOSE SERPL-MCNC: 161 MG/DL — HIGH (ref 70–99)
H CAPSUL MYC AB FLD-ACNC: SIGNIFICANT CHANGE UP
H CAPSUL YST AB FLD-ACNC: SIGNIFICANT CHANGE UP
HCT VFR BLD CALC: 34.4 % — LOW (ref 37–47)
HGB BLD-MCNC: 11.2 G/DL — LOW (ref 12–16)
IMM GRANULOCYTES NFR BLD AUTO: 2.4 % — HIGH (ref 0.1–0.3)
LYMPHOCYTES # BLD AUTO: 1.84 K/UL — SIGNIFICANT CHANGE UP (ref 1.2–3.4)
LYMPHOCYTES # BLD AUTO: 10.4 % — LOW (ref 20.5–51.1)
MAGNESIUM SERPL-MCNC: 2 MG/DL — SIGNIFICANT CHANGE UP (ref 1.8–2.4)
MCHC RBC-ENTMCNC: 29.1 PG — SIGNIFICANT CHANGE UP (ref 27–31)
MCHC RBC-ENTMCNC: 32.6 G/DL — SIGNIFICANT CHANGE UP (ref 32–37)
MCV RBC AUTO: 89.4 FL — SIGNIFICANT CHANGE UP (ref 81–99)
MONOCYTES # BLD AUTO: 0.77 K/UL — HIGH (ref 0.1–0.6)
MONOCYTES NFR BLD AUTO: 4.3 % — SIGNIFICANT CHANGE UP (ref 1.7–9.3)
NEUTROPHILS # BLD AUTO: 14.66 K/UL — HIGH (ref 1.4–6.5)
NEUTROPHILS NFR BLD AUTO: 82.7 % — HIGH (ref 42.2–75.2)
NRBC # BLD: 0 /100 WBCS — SIGNIFICANT CHANGE UP (ref 0–0)
PLATELET # BLD AUTO: 452 K/UL — HIGH (ref 130–400)
PMV BLD: 10.8 FL — HIGH (ref 7.4–10.4)
POTASSIUM SERPL-MCNC: 4.4 MMOL/L — SIGNIFICANT CHANGE UP (ref 3.5–5)
POTASSIUM SERPL-SCNC: 4.4 MMOL/L — SIGNIFICANT CHANGE UP (ref 3.5–5)
PROT SERPL-MCNC: 6.3 G/DL — SIGNIFICANT CHANGE UP (ref 6–8)
RBC # BLD: 3.85 M/UL — LOW (ref 4.2–5.4)
RBC # FLD: 13.6 % — SIGNIFICANT CHANGE UP (ref 11.5–14.5)
SODIUM SERPL-SCNC: 138 MMOL/L — SIGNIFICANT CHANGE UP (ref 135–146)
SPECIMEN SOURCE: SIGNIFICANT CHANGE UP
SPECIMEN SOURCE: SIGNIFICANT CHANGE UP
WBC # BLD: 17.72 K/UL — HIGH (ref 4.8–10.8)
WBC # FLD AUTO: 17.72 K/UL — HIGH (ref 4.8–10.8)

## 2024-06-05 PROCEDURE — 71045 X-RAY EXAM CHEST 1 VIEW: CPT | Mod: 26

## 2024-06-05 PROCEDURE — 93306 TTE W/DOPPLER COMPLETE: CPT | Mod: 26

## 2024-06-05 PROCEDURE — 99232 SBSQ HOSP IP/OBS MODERATE 35: CPT

## 2024-06-05 PROCEDURE — 99233 SBSQ HOSP IP/OBS HIGH 50: CPT

## 2024-06-05 RX ORDER — ENOXAPARIN SODIUM 100 MG/ML
110 INJECTION SUBCUTANEOUS
Refills: 0 | Status: DISCONTINUED | OUTPATIENT
Start: 2024-06-05 | End: 2024-06-07

## 2024-06-05 RX ADMIN — TIOTROPIUM BROMIDE 2 PUFF(S): 18 CAPSULE ORAL; RESPIRATORY (INHALATION) at 08:49

## 2024-06-05 RX ADMIN — SODIUM CHLORIDE 4 MILLILITER(S): 9 INJECTION INTRAMUSCULAR; INTRAVENOUS; SUBCUTANEOUS at 20:05

## 2024-06-05 RX ADMIN — Medication 0.6 MILLIGRAM(S): at 18:03

## 2024-06-05 RX ADMIN — MORPHINE SULFATE 30 MILLIGRAM(S): 50 CAPSULE, EXTENDED RELEASE ORAL at 21:15

## 2024-06-05 RX ADMIN — PIPERACILLIN AND TAZOBACTAM 25 GRAM(S): 4; .5 INJECTION, POWDER, LYOPHILIZED, FOR SOLUTION INTRAVENOUS at 14:29

## 2024-06-05 RX ADMIN — Medication 1: at 09:26

## 2024-06-05 RX ADMIN — MORPHINE SULFATE 30 MILLIGRAM(S): 50 CAPSULE, EXTENDED RELEASE ORAL at 00:13

## 2024-06-05 RX ADMIN — Medication 1 MILLIGRAM(S): at 18:02

## 2024-06-05 RX ADMIN — CHLORHEXIDINE GLUCONATE 1 APPLICATION(S): 213 SOLUTION TOPICAL at 06:24

## 2024-06-05 RX ADMIN — Medication 60 MILLIGRAM(S): at 06:20

## 2024-06-05 RX ADMIN — MORPHINE SULFATE 30 MILLIGRAM(S): 50 CAPSULE, EXTENDED RELEASE ORAL at 20:46

## 2024-06-05 RX ADMIN — Medication 1: at 12:36

## 2024-06-05 RX ADMIN — HEPARIN SODIUM 1000 UNIT(S)/HR: 5000 INJECTION INTRAVENOUS; SUBCUTANEOUS at 07:59

## 2024-06-05 RX ADMIN — PIPERACILLIN AND TAZOBACTAM 25 GRAM(S): 4; .5 INJECTION, POWDER, LYOPHILIZED, FOR SOLUTION INTRAVENOUS at 23:06

## 2024-06-05 RX ADMIN — Medication 1 MILLIGRAM(S): at 12:37

## 2024-06-05 RX ADMIN — Medication 3 MILLILITER(S): at 20:03

## 2024-06-05 RX ADMIN — Medication 2000 UNIT(S): at 12:38

## 2024-06-05 RX ADMIN — Medication 0.6 MILLIGRAM(S): at 06:20

## 2024-06-05 RX ADMIN — BUDESONIDE AND FORMOTEROL FUMARATE DIHYDRATE 2 PUFF(S): 160; 4.5 AEROSOL RESPIRATORY (INHALATION) at 09:56

## 2024-06-05 RX ADMIN — MORPHINE SULFATE 60 MILLIGRAM(S): 50 CAPSULE, EXTENDED RELEASE ORAL at 09:54

## 2024-06-05 RX ADMIN — PIPERACILLIN AND TAZOBACTAM 25 GRAM(S): 4; .5 INJECTION, POWDER, LYOPHILIZED, FOR SOLUTION INTRAVENOUS at 06:20

## 2024-06-05 RX ADMIN — Medication 2: at 17:55

## 2024-06-05 RX ADMIN — MORPHINE SULFATE 30 MILLIGRAM(S): 50 CAPSULE, EXTENDED RELEASE ORAL at 06:24

## 2024-06-05 RX ADMIN — MORPHINE SULFATE 30 MILLIGRAM(S): 50 CAPSULE, EXTENDED RELEASE ORAL at 00:43

## 2024-06-05 RX ADMIN — MORPHINE SULFATE 30 MILLIGRAM(S): 50 CAPSULE, EXTENDED RELEASE ORAL at 14:16

## 2024-06-05 RX ADMIN — ENOXAPARIN SODIUM 110 MILLIGRAM(S): 100 INJECTION SUBCUTANEOUS at 14:17

## 2024-06-05 RX ADMIN — PANTOPRAZOLE SODIUM 40 MILLIGRAM(S): 20 TABLET, DELAYED RELEASE ORAL at 06:21

## 2024-06-05 RX ADMIN — Medication 3 MILLILITER(S): at 08:48

## 2024-06-05 RX ADMIN — Medication 1 MILLIGRAM(S): at 06:20

## 2024-06-05 RX ADMIN — Medication 1 MILLIGRAM(S): at 00:13

## 2024-06-05 NOTE — PROGRESS NOTE ADULT - SUBJECTIVE AND OBJECTIVE BOX
Subjective:  Post pericardial and later on pleural drain for extensive effusion in both area after the recent pericarditis.  Still complains of the same pleuritic chest pain, localized on the left chest on ant axillary line radiates up to the shoulder and respiratory and positional related   No fever, no arthritis, no skin rash,   she has been on steroid affected the WBC    MEDICATIONS  (STANDING):  albuterol/ipratropium for Nebulization 3 milliLiter(s) Nebulizer every 8 hours  ALPRAZolam 1 milliGRAM(s) Oral four times a day  budesonide  80 MICROgram(s)/formoterol 4.5 MICROgram(s) Inhaler 2 Puff(s) Inhalation two times a day  chlorhexidine 2% Cloths 1 Application(s) Topical <User Schedule>  cholecalciferol 2000 Unit(s) Oral daily  colchicine 0.6 milliGRAM(s) Oral two times a day  dextrose 10% Bolus 125 milliLiter(s) IV Bolus once  dextrose 5%. 1000 milliLiter(s) (100 mL/Hr) IV Continuous <Continuous>  dextrose 5%. 1000 milliLiter(s) (50 mL/Hr) IV Continuous <Continuous>  dextrose 50% Injectable 25 Gram(s) IV Push once  dextrose 50% Injectable 12.5 Gram(s) IV Push once  enoxaparin Injectable 110 milliGRAM(s) SubCutaneous <User Schedule>  folic acid 1 milliGRAM(s) Oral daily  glucagon  Injectable 1 milliGRAM(s) IntraMuscular once  insulin lispro (ADMELOG) corrective regimen sliding scale   SubCutaneous three times a day before meals  morphine ER Tablet 60 milliGRAM(s) Oral <User Schedule>  pantoprazole    Tablet 40 milliGRAM(s) Oral before breakfast  piperacillin/tazobactam IVPB.. 3.375 Gram(s) IV Intermittent every 8 hours  sodium chloride 3%  Inhalation 4 milliLiter(s) Inhalation every 12 hours  tiotropium 2.5 MICROgram(s) Inhaler 2 Puff(s) Inhalation daily    MEDICATIONS  (PRN):  acetaminophen     Tablet .. 650 milliGRAM(s) Oral every 6 hours PRN Temp greater or equal to 38C (100.4F), Severe Pain (7 - 10)  albuterol    90 MICROgram(s) HFA Inhaler 2 Puff(s) Inhalation every 6 hours PRN Shortness of Breath and/or Wheezing  aluminum hydroxide/magnesium hydroxide/simethicone Suspension 30 milliLiter(s) Oral every 4 hours PRN Dyspepsia  dextrose Oral Gel 15 Gram(s) Oral once PRN Blood Glucose LESS THAN 70 milliGRAM(s)/deciliter  famotidine    Tablet 20 milliGRAM(s) Oral daily PRN dyspepsia  melatonin 3 milliGRAM(s) Oral at bedtime PRN Insomnia  morphine  IR 15 milliGRAM(s) Oral two times a day PRN Severe Pain (7 - 10)  morphine ER Tablet 30 milliGRAM(s) Oral four times a day PRN severe pain            Vital Signs Last 24 Hrs  T(C): 36.6 (05 Jun 2024 05:00), Max: 36.6 (05 Jun 2024 05:00)  T(F): 97.9 (05 Jun 2024 05:00), Max: 97.9 (05 Jun 2024 05:00)  HR: 80 (05 Jun 2024 05:00) (77 - 80)  BP: 145/74 (05 Jun 2024 05:00) (145/74 - 176/99)  BP(mean): --  RR: 18 (05 Jun 2024 05:00) (18 - 18)  SpO2: 99% (05 Jun 2024 05:00) (96% - 99%)                 REVIEW OF SYSTEMS:  CONSTITUTIONAL: no fever, no chills, no diaphoresis  CARDIOLOGY: pleuritic chest pain, mild SOB, no palpitation, no diaphoresis   RESPIRATORY: dyspnea,  wheeze, no orthopnea, no PND   NEUROLOGICAL: no dizziness, headache,  GI: no abdominal pain, no dyspepsia, no nausea, no vomiting    SKIN: no ecchymosis, no petechia             PHYSICAL EXAM:  · CONSTITUTIONAL: Looks stable, in no respiratory distress  . NECK: Supple, no JVD,    · RESPIRATORY: Decreased air entry to right lung base, scattered fine wheeze, no crackle, no wet rales  · CARDIOVASCULAR: S1, A2, P2, systolic apical murmur, regular rate,  no rub,  · EXTREMITIES: LE puffy and a mild pitting edema  · VASCULAR: Pulses are regular, equal, bilateral in upper and lower extremities  	    ECG: < from: 12 Lead ECG (06.04.24 @ 13:16) >  Normal sinus rhythm  Normal ECG    < end of copied text >      TTE: < from: TTE Echo Complete w/o Contrast w/ Doppler (05.30.24 @ 11:37) >   1. This is a limited study to assess pericardial effusion.   2. Small pericardial effusion. No echocardiographic evidence of   tamponade.   3. Compared to study dated 5/30/24, there is no significant change.    < end of copied text >      LABS:                        11.2   17.72 )-----------( 452      ( 05 Jun 2024 07:40 )             34.4     06-05    138  |  100  |  27<H>  ----------------------------<  161<H>  4.4   |  24  |  1.3    Ca    9.6      05 Jun 2024 07:40  Mg     2.0     06-05    TPro  6.3  /  Alb  3.7  /  TBili  <0.2  /  DBili  x   /  AST  13  /  ALT  21  /  AlkPhos  116<H>  06-05        PTT - ( 05 Jun 2024 07:40 )  PTT:89.4 sec    I&O's Summary    BNP  RADIOLOGY & ADDITIONAL STUDIES: < from: Xray Chest 1 View- PORTABLE-Routine (Xray Chest 1 View- PORTABLE-Routine .) (06.05.24 @ 13:13) >  Support devices: Left-sided pacemaker    Cardiac/mediastinum/hilum: No change    Lung parenchyma/Pleura: Suggestion of minimal decrease in retrocardiac   opacity.    Skeleton/soft tissues: No change    Impression:    Suggestion of minimal decrease in retrocardiac opacity.      < end of copied text >      IMPRESSION AND PLAN:

## 2024-06-05 NOTE — CDI QUERY NOTE - NSCDIOTHERTXTBX_GEN_ALL_CORE_HH
Clinical documentation and/or evidence in the medical record indicates that this patient has pneumonia.  In order to ensure accurate coding and accuracy of the clinical record, the documentation in this patient’s medical record requires additional clarification.      Please include more specific documentation as to the type of pneumonia in your progress note and/or discharge summary.    Please clarify if the LLL Bacterial Pneumonia can be further specified as:      • Patient was treated for LLL gram-negative pneumonia   • Patient was treated for LLL gram-positive pneumonia   • Other (specify)      Supporting documentation and/or clinical evidence:     5/20 H&P: … woke up with pleuritic chest pain and increased SOB, She reports she has required her home O2 use more frequently as well. Reports TMax 104 and went to urgent care on Friday where she was given Augmentin. When her symptoms did not improve she presented to the ED… Diffuse crackles with decreased breath sounds at bases…     5/26 Hospitalist Progress Note: … #LLL Bacterial Pneumonia with Chronic hypoxia… -blood cultures NTD. -sputum culture pending. -MRSA nares negative... pmh  of HTN, HLD, COPD on home O2 5L, SLE, antiphospholipid syndrome,  rheumatoid arthritis, cervical cancer sp hysterectomy,  laryngeal cancer s/p CT, RT…     Radiology Results:  • 5/20 CXR Impression: Left lower lung lung zone opacity.  • 5/23 CXR Impression: Bibasilar opacities/effusions.    Orders:  • IV Zithromax 500 mg x1 ind: infxn (5/19)  • IV Doxycycline 100 mg Q12H ind: myron (5/20)  • IV Zosyn 3.375G x1 ind: Pneumonia (5/20)  • IV Unasyn 3G Q6H ind: pna (5/20 – 5/25)  • Doxycycline 100 mg PO Q12H ind: CAP (5/25 – 5/26)  • Augmentin 875 mg/125 mg PO BID ind: CAP (5/25 – 5/26)

## 2024-06-05 NOTE — PROGRESS NOTE ADULT - ASSESSMENT
54 y/o F PMHx  of HTN, HLD, COPD on home O2, SLE, lupus anticoagulant, antiphospholipid syndrome,  rheumatoid arthritis, cervical cancer sp hysterectomy,  laryngeal cancer s/p CT, RT, HFpEF (06/21/19 EF 63%) s/p AICD+PPM, DVT/PE (on lovenox QD),  NSTEMI in 2015, subclavian stenosis s/p stent placement presenting for shortness of breath and pleuritic chest pain.     # Large pericardial effusion, early tamponade physiology present based on bedside ECHO  # HFpEF  # Hx of Torsades  # NSTEMI in 2015  - Underwent successful pericardiocentesis on 5/29/24, s/p pericardial drain with 950 cc bloody output on 5/29. drain output 40cc since insertion, drain removed 5/30.   - Echo with normal function and small pericardial effusion.  - Continue colchicine 0.6 mg BID for at least 3 months  - Cardio recommending high dose ASA, 650 mg TID for at least the next week for pericardial pain, patient has been off aspirin for the last 2 years due to a episode of UGIB, will hold off on aspirin.     # Left pleural effusion moderate s/p thoracentesis  # Left lung atelectasis, improved  # Hx COPD on home O2  - s/p thoracentesis 650 cc cloudy fluid drained  - Currently saturating 94% on 2 lpm NC   - c/w solumedrol 60 q24 -> change to prednisone 40 mg daily tomorrow  - c/w spiriva and symbicort  - Nebs q6  - Chest PT    # Anti-phospholipid syndrome  # Extensive history of DVTs, recent in stent (subclavian vein) thrombosis  # Subcutaneous hematomas and bruising due to supratherapeutic INR  # Cerebral hemorrhage from Coumadin  - On Lovenox 110 mg sc daily at home as decided by primary hem-onc team outpatient  - Discontinue the heparin drip today and transition to home dose of lovenox    # Chronic morphine for pain   - Pain control per home meds.   - c/w morphine IR 15 mg q12  - c/w morphine ER 30 mg q6    # Fever with Pericarditis and Pleural effusion , r/o infectious etiology  - Infectious workup so far has been negative  - Blood culture, procalcitonin, MRSA, Legionella, Strep, Parvovirus, Coxsackie : Negative  - complete empiric 7 days of antibiotics , on zosyn end 6/6  - f/u histoplasma, adenovirus, QuantiFeron, pericardial viral culture    Miscellaneous:  DVT prophylaxis: Lovenox  Bowel  - Feeds: DASH/TLC  - Prophylaxis: Pantoprazole  - Regimen: None  Code status: Full                  52 y/o F PMHx  of HTN, HLD, COPD on home O2, SLE, lupus anticoagulant, antiphospholipid syndrome,  rheumatoid arthritis, cervical cancer sp hysterectomy,  laryngeal cancer s/p CT, RT, HFpEF (06/21/19 EF 63%) s/p AICD+PPM, DVT/PE (on lovenox QD),  NSTEMI in 2015, subclavian stenosis s/p stent placement presenting for shortness of breath and pleuritic chest pain.     # Large pericardial effusion, early tamponade physiology present based on bedside ECHO  # HFpEF  # Hx of Torsades  # NSTEMI in 2015  - Underwent successful pericardiocentesis on 5/29/24, s/p pericardial drain with 950 cc bloody output on 5/29. drain output 40cc since insertion, drain removed 5/30.   - Echo with normal function and small pericardial effusion.  - Continue colchicine 0.6 mg BID for at least 3 months  - Cardio recommending high dose ASA, 650 mg TID for at least the next week for pericardial pain, patient has been off aspirin for the last 2 years due to a episode of UGIB, will hold off on aspirin.     # Left pleural effusion moderate s/p thoracentesis  # Left lung atelectasis, improved  # Hx COPD on home O2  - s/p thoracentesis 650 cc cloudy fluid drained, exudative  - Currently saturating 94% on 2 lpm NC   - c/w solumedrol 60 q24 -> change to prednisone 40 mg daily tomorrow  - c/w spiriva and symbicort  - Nebs q6  - Chest PT    # Anti-phospholipid syndrome  # Extensive history of DVTs, recent in stent (subclavian vein) thrombosis  # Subcutaneous hematomas and bruising due to supratherapeutic INR  # Cerebral hemorrhage from Coumadin  - On Lovenox 110 mg sc daily at home as decided by primary hem-onc team outpatient  - Discontinue the heparin drip today and transition to home dose of lovenox    # Chronic morphine for pain   - Pain control per home meds.   - c/w morphine IR 15 mg q12  - c/w morphine ER 30 mg q6    # Fever with Pericarditis and Pleural effusion , r/o infectious etiology  - Infectious workup so far has been negative  - Blood culture, procalcitonin, MRSA, Legionella, Strep, Parvovirus, Coxsackie : Negative  - complete empiric 7 days of antibiotics , on zosyn end 6/6  - f/u histoplasma, adenovirus, QuantiFeron, pericardial viral culture    Miscellaneous:  DVT prophylaxis: Lovenox  Bowel  - Feeds: DASH/TLC  - Prophylaxis: Pantoprazole  - Regimen: None  Code status: Full

## 2024-06-05 NOTE — PROGRESS NOTE ADULT - SUBJECTIVE AND OBJECTIVE BOX
Over Night Events: events noted, on RA, co diarrhea, chest discomfort, afebrile    PHYSICAL EXAM    ICU Vital Signs Last 24 Hrs  T(C): 36.6 (05 Jun 2024 05:00), Max: 36.6 (05 Jun 2024 05:00)  T(F): 97.9 (05 Jun 2024 05:00), Max: 97.9 (05 Jun 2024 05:00)  HR: 80 (05 Jun 2024 05:00) (77 - 80)  BP: 145/74 (05 Jun 2024 05:00) (145/74 - 176/99)  RR: 18 (05 Jun 2024 05:00) (18 - 18)  SpO2: 99% (05 Jun 2024 05:00) (96% - 99%)        General: ill looking  Lungs: dec bs both bases, No wheezing  Cardiovascular: MARIELOS 2.6  Abdomen: Soft, Positive BS  Extremities: No clubbing, swelling+  Neurological: Non focal         LABS:                          11.2   17.72 )-----------( 452      ( 05 Jun 2024 07:40 )             34.4                                               06-05    138  |  100  |  27<H>  ----------------------------<  161<H>  4.4   |  24  |  1.3    Ca    9.6      05 Jun 2024 07:40  Mg     2.0     06-05    TPro  6.3  /  Alb  3.7  /  TBili  <0.2  /  DBili  x   /  AST  13  /  ALT  21  /  AlkPhos  116<H>  06-05      PTT - ( 05 Jun 2024 07:40 )  PTT:89.4 sec                                       Urinalysis Basic - ( 05 Jun 2024 07:40 )    Color: x / Appearance: x / SG: x / pH: x  Gluc: 161 mg/dL / Ketone: x  / Bili: x / Urobili: x   Blood: x / Protein: x / Nitrite: x   Leuk Esterase: x / RBC: x / WBC x   Sq Epi: x / Non Sq Epi: x / Bacteria: x                                                  LIVER FUNCTIONS - ( 05 Jun 2024 07:40 )  Alb: 3.7 g/dL / Pro: 6.3 g/dL / ALK PHOS: 116 U/L / ALT: 21 U/L / AST: 13 U/L / GGT: x                                                                                                                                       MEDICATIONS  (STANDING):  albuterol/ipratropium for Nebulization 3 milliLiter(s) Nebulizer every 8 hours  ALPRAZolam 1 milliGRAM(s) Oral four times a day  budesonide  80 MICROgram(s)/formoterol 4.5 MICROgram(s) Inhaler 2 Puff(s) Inhalation two times a day  chlorhexidine 2% Cloths 1 Application(s) Topical <User Schedule>  cholecalciferol 2000 Unit(s) Oral daily  colchicine 0.6 milliGRAM(s) Oral two times a day  dextrose 10% Bolus 125 milliLiter(s) IV Bolus once  dextrose 5%. 1000 milliLiter(s) (100 mL/Hr) IV Continuous <Continuous>  dextrose 5%. 1000 milliLiter(s) (50 mL/Hr) IV Continuous <Continuous>  dextrose 50% Injectable 12.5 Gram(s) IV Push once  dextrose 50% Injectable 25 Gram(s) IV Push once  enoxaparin Injectable 110 milliGRAM(s) SubCutaneous <User Schedule>  folic acid 1 milliGRAM(s) Oral daily  glucagon  Injectable 1 milliGRAM(s) IntraMuscular once  insulin lispro (ADMELOG) corrective regimen sliding scale   SubCutaneous three times a day before meals  morphine ER Tablet 60 milliGRAM(s) Oral <User Schedule>  pantoprazole    Tablet 40 milliGRAM(s) Oral before breakfast  piperacillin/tazobactam IVPB.. 3.375 Gram(s) IV Intermittent every 8 hours  sodium chloride 3%  Inhalation 4 milliLiter(s) Inhalation every 12 hours  tiotropium 2.5 MICROgram(s) Inhaler 2 Puff(s) Inhalation daily    MEDICATIONS  (PRN):  acetaminophen     Tablet .. 650 milliGRAM(s) Oral every 6 hours PRN Temp greater or equal to 38C (100.4F), Severe Pain (7 - 10)  albuterol    90 MICROgram(s) HFA Inhaler 2 Puff(s) Inhalation every 6 hours PRN Shortness of Breath and/or Wheezing  aluminum hydroxide/magnesium hydroxide/simethicone Suspension 30 milliLiter(s) Oral every 4 hours PRN Dyspepsia  dextrose Oral Gel 15 Gram(s) Oral once PRN Blood Glucose LESS THAN 70 milliGRAM(s)/deciliter  famotidine    Tablet 20 milliGRAM(s) Oral daily PRN dyspepsia  melatonin 3 milliGRAM(s) Oral at bedtime PRN Insomnia  morphine  IR 15 milliGRAM(s) Oral two times a day PRN Severe Pain (7 - 10)  morphine ER Tablet 30 milliGRAM(s) Oral four times a day PRN severe pain

## 2024-06-05 NOTE — PROGRESS NOTE ADULT - ATTENDING COMMENTS
Patient is a 54 y/o Female with PMHx  of HTN, HLD, COPD on home O2, SLE, lupus anticoagulant, antiphospholipid syndrome,  rheumatoid arthritis, cervical cancer sp hysterectomy,  laryngeal cancer s/p CT, RT, HFpEF (06/21/19 EF 63%) s/p AICD+PPM, DVT/PE (on lovenox QD),  NSTEMI in 2015, subclavian stenosis s/p stent placement presenting for shortness of breath and pleuritic chest pain. Pt reports on Wed she woke up with pleuritic chest pain and increased SOB. Pt with recent admission 1 week ago for Pneumonia, was DC from hospital on Sunday with oral Abx regimen. States she did not feel well at home, continued to have SOB, intermittent fevers, and increasing need for O2 so she came back to the hospital.    # Large pericardial effusion, early tamponade physiology present based on bedside ECHO  # HFpEF  # Hx of Torsades  # NSTEMI in 2015  - Underwent successful pericardiocentesis on 5/29/24, s/p pericardial drain with 950 cc bloody output on 5/29. drain output 40cc since insertion, drain removed 5/30.   - Echo with normal function and small pericardial effusion.  - Continue colchicine 0.6 mg BID for at least 3 months  - patient refusing to take   mg TID as  was advised by Cardio, reporting that she will develop gastric ulcer. this was not recommended by [facility]. team.  -6/5/24- patient c/o chest and back pain, dyspnea at rest, will repeat limited echo to assess if effusion if reaccumulating?    # Left pleural effusion moderate s/p thoracentesis  # Left lung atelectasis, improved  # Hx COPD on home O2  - s/p thoracentesis 650 cc cloudy fluid drained  - Currently saturating 94% on RA  - 6/5/24 transitioned to PO Prednisone 40 mg once daily, from IV solumedrol tx.  - c/w spiriva and symbicort,  Nebs q6  - outpatient Pulmonary specialist f/up       # Anti-phospholipid syndrome  # Extensive history of DVTs, recent in stent (subclavian vein) thrombosis  # Cerebral hemorrhage from Coumadin  - On Lovenox 110 mg sc daily at home- resumed on Lovenox tx. from 6/5/24  -close outpatient CBC monitoring, outpatient  Neuro, Rheumatology f/up     # Chronic morphine for pain   - Pain control per home meds.   - c/w morphine IR 15 mg q12  - c/w morphine ER 30 mg q6    # Fever with Pericarditis and Pleural effusion , no  infectious etiology  - Blood culture, procalcitonin, MRSA, Legionella, Strep, Parvovirus neg , Coxsackie ab's positive   - completing  empiric 7 days of antibiotics , on zosyn end 6/6      DVT prophylaxis: therapeutic lovenox tx.    - Feeds: DASH/TLC  - Prophylaxis: Pantoprazole  Code status: Full .    #Progress Note Handoff: Pending CXR, limited echo to assess if pericardial effusion reaccumulating, transitioned to PO Prednisone, d/c Heparin drip, resume on Lovenox tx. d/c planning in  48 hours. if all above tests are negative for worsening effusions  Family discussion: current medical plan of tx. d/w patient by bedside Disposition: Home__ in 48 hours    Total time spent to complete patient's bedside assessment, review medical chart, discuss medical plan of care with covering medical team was more than 55 minutes with >50% of time spent face to face with patient, discussion with patient/family and/or coordination of care

## 2024-06-05 NOTE — PROGRESS NOTE ADULT - ASSESSMENT
Pericarditis, pleuritis, polyserositis  Post pleura- pericardial drain, initially we suggested pleuropericardial window, but due to high risk of patient more conservative approach was suggested by CT surgery team  Underlying CTD, coagulopathy, DVT  Non cardiac but pleuritic chest pain already on medicine  Deconditioning after recent admission, very limited mobility by far    Steroid, Colchicine analgesics  ambulate   Likely needs more hospital care and management, but mainly deferred to pulmonary team and medicine team as well   No plan for invasive cardiac w/u  Needs f/u echo and chest xray in few weeks

## 2024-06-05 NOTE — PROGRESS NOTE ADULT - SUBJECTIVE AND OBJECTIVE BOX
CHIEF COMPLAINT:Patient is a 53y old Female who presents with a chief complaint of Large pericardial effusion (04 Jun 2024 10:04)    Primary diagnosis of Acute dyspnea        OVERNIGHT EVENTS: No events    INTERVAL HPI: No new complaints    PHYSICAL EXAMINATION:   CONSTITUTIONAL: Not in acute distress  NEUROLOGICAL: Alert and oriented x 3  EYES: Pupils equal, Round and reactive to light.  CARDIAC: Normal rate, Regular rhythm.    RESPIRATORY: No wheezing, No crackles, Normal chest expansion, Not tachypneic, No use of accessory muscles  GASTROINTESTINAL: Abdomen soft, Non-tender, No guarding, + BS  SKIN: Skin intact  EXTREMITIES:  2+ Peripheral Pulses, No clubbing, cyanosis, or edema    PAST MEDICAL & SURGICAL HISTORY  Lupus    RA (rheumatoid arthritis)    HTN (hypertension)    CHF (congestive heart failure)    COPD (chronic obstructive pulmonary disease)    DVT (deep venous thrombosis)    Pulmonary embolism    History of laryngeal cancer    GERD (gastroesophageal reflux disease)    Cervical cancer    APS (antiphospholipid syndrome)    S/P appendectomy    S/P cholecystectomy    AICD (automatic cardioverter/defibrillator) present  [employer]    S/P hysterectomy    History of back surgery    H/O foot surgery    S/P eye surgery    Subclavian arterial stenosis      SOCIAL HISTORY:  Social History:      ALLERGIES:  Vantin (Other (Mild))  Plaquenil (Other)  adhesives (Rash; Urticaria)  Avelox (Other)  Arava (Anaphylaxis; Angioedema)      VITALS:   T(F): 97.9  HR: 80  BP: 145/74  RR: 18  SpO2: 99%    LABS:                        11.2   17.72 )-----------( 452      ( 05 Jun 2024 07:40 )             34.4     06-05    138  |  100  |  27<H>  ----------------------------<  161<H>  4.4   |  24  |  1.3    Ca    9.6      05 Jun 2024 07:40  Mg     2.0     06-05    TPro  6.3  /  Alb  3.7  /  TBili  <0.2  /  DBili  x   /  AST  13  /  ALT  21  /  AlkPhos  116<H>  06-05    PTT - ( 05 Jun 2024 07:40 )  PTT:89.4 sec  Urinalysis Basic - ( 05 Jun 2024 07:40 )    Color: x / Appearance: x / SG: x / pH: x  Gluc: 161 mg/dL / Ketone: x  / Bili: x / Urobili: x   Blood: x / Protein: x / Nitrite: x   Leuk Esterase: x / RBC: x / WBC x   Sq Epi: x / Non Sq Epi: x / Bacteria: x      HOME MEDICATIONS:  albuterol 90 mcg/inh inhalation aerosol: 2 puff(s) inhaled every 6 hours, As Needed  cholecalciferol 125 mcg (5000 intl units) oral tablet: 1 tab(s) orally once a day  cyanocobalamin 1000 mcg/mL injectable solution: 1 milliliter(s) injectable once a week  folic acid 1 mg oral tablet: 1 tab(s) orally once a day  Lovenox 100 mg/mL injectable solution: 110 milligram(s) subcutaneously once a day (at bedtime)  Metoprolol Succinate  mg oral tablet, extended release: 1 tab(s) orally once a day  morphine 15 mg oral capsule: 1 tab(s) orally 2 times a day as needed for  severe pain  MS Contin 30 mg oral tablet, extended release: 1 tab(s) orally 7 times a day  Trelegy Ellipta 100 mcg-62.5 mcg-25 mcg/inh inhalation powder: 1 puff(s) inhaled once a day  Xanax 1 mg oral tablet: 1 tab(s) orally 4 times a day      MEDICATIONS:  STANDING MEDICATIONS  albuterol/ipratropium for Nebulization 3 milliLiter(s) Nebulizer every 8 hours  ALPRAZolam 1 milliGRAM(s) Oral four times a day  budesonide  80 MICROgram(s)/formoterol 4.5 MICROgram(s) Inhaler 2 Puff(s) Inhalation two times a day  chlorhexidine 2% Cloths 1 Application(s) Topical <User Schedule>  cholecalciferol 2000 Unit(s) Oral daily  colchicine 0.6 milliGRAM(s) Oral two times a day  dextrose 10% Bolus 125 milliLiter(s) IV Bolus once  dextrose 5%. 1000 milliLiter(s) IV Continuous <Continuous>  dextrose 5%. 1000 milliLiter(s) IV Continuous <Continuous>  dextrose 50% Injectable 12.5 Gram(s) IV Push once  dextrose 50% Injectable 25 Gram(s) IV Push once  enoxaparin Injectable 110 milliGRAM(s) SubCutaneous <User Schedule>  folic acid 1 milliGRAM(s) Oral daily  glucagon  Injectable 1 milliGRAM(s) IntraMuscular once  insulin lispro (ADMELOG) corrective regimen sliding scale   SubCutaneous three times a day before meals  morphine ER Tablet 60 milliGRAM(s) Oral <User Schedule>  pantoprazole    Tablet 40 milliGRAM(s) Oral before breakfast  piperacillin/tazobactam IVPB.. 3.375 Gram(s) IV Intermittent every 8 hours  sodium chloride 3%  Inhalation 4 milliLiter(s) Inhalation every 12 hours  tiotropium 2.5 MICROgram(s) Inhaler 2 Puff(s) Inhalation daily    PRN MEDICATIONS  acetaminophen     Tablet .. 650 milliGRAM(s) Oral every 6 hours PRN  albuterol    90 MICROgram(s) HFA Inhaler 2 Puff(s) Inhalation every 6 hours PRN  aluminum hydroxide/magnesium hydroxide/simethicone Suspension 30 milliLiter(s) Oral every 4 hours PRN  dextrose Oral Gel 15 Gram(s) Oral once PRN  famotidine    Tablet 20 milliGRAM(s) Oral daily PRN  melatonin 3 milliGRAM(s) Oral at bedtime PRN  morphine  IR 15 milliGRAM(s) Oral two times a day PRN  morphine ER Tablet 30 milliGRAM(s) Oral four times a day PRN

## 2024-06-05 NOTE — PROGRESS NOTE ADULT - ASSESSMENT
Impression:     Left pleural effusion - moderate improved  Large pericardial effusion s/p pericardiocentesis resolved  Left lung atelectasis improved  Acute hypoxemic respiratory failure improved on RA  History of COPD   SLE, lupus AC, APLS on AC   History of cervical and laryngeal cancer   TIAN     Plan:     S/P thoracentesis/ cx/ cyto noted  repeat ct scan reviewed  PPI  prednisone 40 daily  lasix 40 daily  CXR PA/ LAT  Cardio fup  full AC  Rheumato fup  Chest PT   Neb q 6 h

## 2024-06-06 LAB
ALBUMIN SERPL ELPH-MCNC: 3.8 G/DL — SIGNIFICANT CHANGE UP (ref 3.5–5.2)
ALP SERPL-CCNC: 96 U/L — SIGNIFICANT CHANGE UP (ref 30–115)
ALT FLD-CCNC: 17 U/L — SIGNIFICANT CHANGE UP (ref 0–41)
ANION GAP SERPL CALC-SCNC: 13 MMOL/L — SIGNIFICANT CHANGE UP (ref 7–14)
AST SERPL-CCNC: 10 U/L — SIGNIFICANT CHANGE UP (ref 0–41)
BASOPHILS # BLD AUTO: 0.1 K/UL — SIGNIFICANT CHANGE UP (ref 0–0.2)
BASOPHILS NFR BLD AUTO: 0.5 % — SIGNIFICANT CHANGE UP (ref 0–1)
BILIRUB SERPL-MCNC: <0.2 MG/DL — SIGNIFICANT CHANGE UP (ref 0.2–1.2)
BUN SERPL-MCNC: 27 MG/DL — HIGH (ref 10–20)
CALCIUM SERPL-MCNC: 9.8 MG/DL — SIGNIFICANT CHANGE UP (ref 8.4–10.5)
CHLORIDE SERPL-SCNC: 99 MMOL/L — SIGNIFICANT CHANGE UP (ref 98–110)
CO2 SERPL-SCNC: 25 MMOL/L — SIGNIFICANT CHANGE UP (ref 17–32)
CREAT SERPL-MCNC: 1.2 MG/DL — SIGNIFICANT CHANGE UP (ref 0.7–1.5)
CULTURE RESULTS: SIGNIFICANT CHANGE UP
CULTURE RESULTS: SIGNIFICANT CHANGE UP
EGFR: 54 ML/MIN/1.73M2 — LOW
EOSINOPHIL # BLD AUTO: 0.01 K/UL — SIGNIFICANT CHANGE UP (ref 0–0.7)
EOSINOPHIL NFR BLD AUTO: 0.1 % — SIGNIFICANT CHANGE UP (ref 0–8)
GLUCOSE BLDC GLUCOMTR-MCNC: 131 MG/DL — HIGH (ref 70–99)
GLUCOSE BLDC GLUCOMTR-MCNC: 145 MG/DL — HIGH (ref 70–99)
GLUCOSE BLDC GLUCOMTR-MCNC: 150 MG/DL — HIGH (ref 70–99)
GLUCOSE BLDC GLUCOMTR-MCNC: 154 MG/DL — HIGH (ref 70–99)
GLUCOSE SERPL-MCNC: 135 MG/DL — HIGH (ref 70–99)
HADV DNA FLD NAA+PROBE-LOG#: SIGNIFICANT CHANGE UP COPIES/ML
HCT VFR BLD CALC: 37.2 % — SIGNIFICANT CHANGE UP (ref 37–47)
HGB BLD-MCNC: 12.1 G/DL — SIGNIFICANT CHANGE UP (ref 12–16)
IMM GRANULOCYTES NFR BLD AUTO: 5.2 % — HIGH (ref 0.1–0.3)
LYMPHOCYTES # BLD AUTO: 17.7 % — LOW (ref 20.5–51.1)
LYMPHOCYTES # BLD AUTO: 3.47 K/UL — HIGH (ref 1.2–3.4)
MAGNESIUM SERPL-MCNC: 1.8 MG/DL — SIGNIFICANT CHANGE UP (ref 1.8–2.4)
MCHC RBC-ENTMCNC: 28.9 PG — SIGNIFICANT CHANGE UP (ref 27–31)
MCHC RBC-ENTMCNC: 32.5 G/DL — SIGNIFICANT CHANGE UP (ref 32–37)
MCV RBC AUTO: 89 FL — SIGNIFICANT CHANGE UP (ref 81–99)
MONOCYTES # BLD AUTO: 1.45 K/UL — HIGH (ref 0.1–0.6)
MONOCYTES NFR BLD AUTO: 7.4 % — SIGNIFICANT CHANGE UP (ref 1.7–9.3)
NEUTROPHILS # BLD AUTO: 13.55 K/UL — HIGH (ref 1.4–6.5)
NEUTROPHILS NFR BLD AUTO: 69.1 % — SIGNIFICANT CHANGE UP (ref 42.2–75.2)
NRBC # BLD: 0 /100 WBCS — SIGNIFICANT CHANGE UP (ref 0–0)
PLATELET # BLD AUTO: 493 K/UL — HIGH (ref 130–400)
PMV BLD: 10.3 FL — SIGNIFICANT CHANGE UP (ref 7.4–10.4)
POTASSIUM SERPL-MCNC: 4.3 MMOL/L — SIGNIFICANT CHANGE UP (ref 3.5–5)
POTASSIUM SERPL-SCNC: 4.3 MMOL/L — SIGNIFICANT CHANGE UP (ref 3.5–5)
PROT SERPL-MCNC: 6.5 G/DL — SIGNIFICANT CHANGE UP (ref 6–8)
RBC # BLD: 4.18 M/UL — LOW (ref 4.2–5.4)
RBC # FLD: 13.9 % — SIGNIFICANT CHANGE UP (ref 11.5–14.5)
SODIUM SERPL-SCNC: 137 MMOL/L — SIGNIFICANT CHANGE UP (ref 135–146)
SPECIMEN SOURCE: SIGNIFICANT CHANGE UP
SPECIMEN SOURCE: SIGNIFICANT CHANGE UP
WBC # BLD: 19.59 K/UL — HIGH (ref 4.8–10.8)
WBC # FLD AUTO: 19.59 K/UL — HIGH (ref 4.8–10.8)

## 2024-06-06 PROCEDURE — 99232 SBSQ HOSP IP/OBS MODERATE 35: CPT

## 2024-06-06 PROCEDURE — 93010 ELECTROCARDIOGRAM REPORT: CPT

## 2024-06-06 RX ORDER — ALPRAZOLAM 0.25 MG
1 TABLET ORAL
Refills: 0 | Status: DISCONTINUED | OUTPATIENT
Start: 2024-06-06 | End: 2024-06-06

## 2024-06-06 RX ORDER — ALPRAZOLAM 0.25 MG
1 TABLET ORAL
Refills: 0 | Status: DISCONTINUED | OUTPATIENT
Start: 2024-06-06 | End: 2024-06-07

## 2024-06-06 RX ORDER — LOPERAMIDE HCL 2 MG
2 TABLET ORAL EVERY 8 HOURS
Refills: 0 | Status: DISCONTINUED | OUTPATIENT
Start: 2024-06-06 | End: 2024-06-07

## 2024-06-06 RX ADMIN — Medication 1: at 11:57

## 2024-06-06 RX ADMIN — Medication 40 MILLIGRAM(S): at 06:47

## 2024-06-06 RX ADMIN — Medication 2000 UNIT(S): at 11:48

## 2024-06-06 RX ADMIN — PIPERACILLIN AND TAZOBACTAM 25 GRAM(S): 4; .5 INJECTION, POWDER, LYOPHILIZED, FOR SOLUTION INTRAVENOUS at 06:48

## 2024-06-06 RX ADMIN — Medication 3 MILLILITER(S): at 07:47

## 2024-06-06 RX ADMIN — Medication 0.6 MILLIGRAM(S): at 06:48

## 2024-06-06 RX ADMIN — Medication 1 MILLIGRAM(S): at 11:47

## 2024-06-06 RX ADMIN — Medication 0.6 MILLIGRAM(S): at 17:17

## 2024-06-06 RX ADMIN — ENOXAPARIN SODIUM 110 MILLIGRAM(S): 100 INJECTION SUBCUTANEOUS at 11:56

## 2024-06-06 RX ADMIN — MORPHINE SULFATE 30 MILLIGRAM(S): 50 CAPSULE, EXTENDED RELEASE ORAL at 00:08

## 2024-06-06 RX ADMIN — MORPHINE SULFATE 60 MILLIGRAM(S): 50 CAPSULE, EXTENDED RELEASE ORAL at 08:05

## 2024-06-06 RX ADMIN — BUDESONIDE AND FORMOTEROL FUMARATE DIHYDRATE 2 PUFF(S): 160; 4.5 AEROSOL RESPIRATORY (INHALATION) at 08:57

## 2024-06-06 RX ADMIN — MORPHINE SULFATE 60 MILLIGRAM(S): 50 CAPSULE, EXTENDED RELEASE ORAL at 08:35

## 2024-06-06 RX ADMIN — Medication 3 MILLIGRAM(S): at 22:26

## 2024-06-06 RX ADMIN — MORPHINE SULFATE 30 MILLIGRAM(S): 50 CAPSULE, EXTENDED RELEASE ORAL at 17:15

## 2024-06-06 RX ADMIN — MORPHINE SULFATE 30 MILLIGRAM(S): 50 CAPSULE, EXTENDED RELEASE ORAL at 00:38

## 2024-06-06 RX ADMIN — Medication 1 MILLIGRAM(S): at 17:16

## 2024-06-06 RX ADMIN — SODIUM CHLORIDE 4 MILLILITER(S): 9 INJECTION INTRAMUSCULAR; INTRAVENOUS; SUBCUTANEOUS at 19:59

## 2024-06-06 RX ADMIN — MORPHINE SULFATE 30 MILLIGRAM(S): 50 CAPSULE, EXTENDED RELEASE ORAL at 22:26

## 2024-06-06 RX ADMIN — MORPHINE SULFATE 30 MILLIGRAM(S): 50 CAPSULE, EXTENDED RELEASE ORAL at 11:56

## 2024-06-06 RX ADMIN — Medication 1 MILLIGRAM(S): at 00:36

## 2024-06-06 RX ADMIN — Medication 3 MILLILITER(S): at 19:58

## 2024-06-06 RX ADMIN — SODIUM CHLORIDE 4 MILLILITER(S): 9 INJECTION INTRAMUSCULAR; INTRAVENOUS; SUBCUTANEOUS at 07:47

## 2024-06-06 RX ADMIN — MORPHINE SULFATE 15 MILLIGRAM(S): 50 CAPSULE, EXTENDED RELEASE ORAL at 06:58

## 2024-06-06 RX ADMIN — MORPHINE SULFATE 30 MILLIGRAM(S): 50 CAPSULE, EXTENDED RELEASE ORAL at 17:45

## 2024-06-06 RX ADMIN — Medication 3 MILLILITER(S): at 15:44

## 2024-06-06 RX ADMIN — Medication 1 MILLIGRAM(S): at 23:06

## 2024-06-06 RX ADMIN — PANTOPRAZOLE SODIUM 40 MILLIGRAM(S): 20 TABLET, DELAYED RELEASE ORAL at 06:46

## 2024-06-06 RX ADMIN — MORPHINE SULFATE 30 MILLIGRAM(S): 50 CAPSULE, EXTENDED RELEASE ORAL at 12:24

## 2024-06-06 RX ADMIN — Medication 1 MILLIGRAM(S): at 06:46

## 2024-06-06 NOTE — PROGRESS NOTE ADULT - SUBJECTIVE AND OBJECTIVE BOX
ILEANA MCNEIL  53y, Female  Allergy: Vantin (Other (Mild))  Plaquenil (Other)  adhesives (Rash; Urticaria)  Avelox (Other)  Arava (Anaphylaxis; Angioedema)      LOS  9d    CHIEF COMPLAINT: Large pericardial effusion (06 Jun 2024 13:20)      INTERVAL EVENTS/HPI  - No acute events overnight  - T(F): , Max: 98 (06-06-24 @ 05:00)  - Denies any worsening symptoms  - Tolerating medication  - WBC Count: 19.59 (06-06-24 @ 07:09)  WBC Count: 17.72 (06-05-24 @ 07:40)     - Creatinine: 1.2 (06-06-24 @ 07:09)  Creatinine: 1.3 (06-05-24 @ 07:40)       ROS  General: Denies rigors, nightsweats  HEENT: Denies headache, rhinorrhea, sore throat, eye pain  CV: Denies CP, palpitations  PULM: Denies wheezing, hemoptysis  GI: Denies hematemesis, hematochezia, melena  : Denies discharge, hematuria  MSK: Denies arthralgias, myalgias  SKIN: Denies rash, lesions  NEURO: Denies paresthesias, weakness  PSYCH: Denies depression, anxiety    VITALS:  T(F): 98, Max: 98 (06-06-24 @ 05:00)  HR: 70  BP: 147/74  RR: 18Vital Signs Last 24 Hrs  T(C): 36.7 (06 Jun 2024 13:32), Max: 36.7 (06 Jun 2024 05:00)  T(F): 98 (06 Jun 2024 13:32), Max: 98 (06 Jun 2024 05:00)  HR: 70 (06 Jun 2024 13:32) (70 - 82)  BP: 147/74 (06 Jun 2024 13:32) (138/78 - 175/90)  BP(mean): --  RR: 18 (06 Jun 2024 05:00) (18 - 18)  SpO2: 96% (06 Jun 2024 13:32) (96% - 99%)    Parameters below as of 06 Jun 2024 13:32  Patient On (Oxygen Delivery Method): room air        PHYSICAL EXAM:  Gen: NAD, resting in bed NC  HEENT: Normocephalic, atraumatic  Neck: supple, no lymphadenopathy  CV: Regular rate & regular rhythm  Lungs: decreased BS at bases, no fremitus  Abdomen: Soft, BS present  Ext: Warm, well perfused  Neuro: non focal, awake  Skin: no rash, no erythema  Lines: no phlebitis    FH: Non-contributory  Social Hx: Non-contributory    TESTS & MEASUREMENTS:                        12.1   19.59 )-----------( 493      ( 06 Jun 2024 07:09 )             37.2     06-06    137  |  99  |  27<H>  ----------------------------<  135<H>  4.3   |  25  |  1.2    Ca    9.8      06 Jun 2024 07:09  Mg     1.8     06-06    TPro  6.5  /  Alb  3.8  /  TBili  <0.2  /  DBili  x   /  AST  10  /  ALT  17  /  AlkPhos  96  06-06      LIVER FUNCTIONS - ( 06 Jun 2024 07:09 )  Alb: 3.8 g/dL / Pro: 6.5 g/dL / ALK PHOS: 96 U/L / ALT: 17 U/L / AST: 10 U/L / GGT: x           Urinalysis Basic - ( 06 Jun 2024 07:09 )    Color: x / Appearance: x / SG: x / pH: x  Gluc: 135 mg/dL / Ketone: x  / Bili: x / Urobili: x   Blood: x / Protein: x / Nitrite: x   Leuk Esterase: x / RBC: x / WBC x   Sq Epi: x / Non Sq Epi: x / Bacteria: x        Culture - Blood (collected 06-01-24 @ 04:43)  Source: .Blood None  Preliminary Report (06-05-24 @ 17:01):    No growth at 4 days    Culture - Blood (collected 05-31-24 @ 16:41)  Source: .Blood None  Final Report (06-06-24 @ 01:00):    No growth at 5 days    Culture - Blood (collected 05-31-24 @ 11:40)  Source: .Blood None  Final Report (06-05-24 @ 22:00):    No growth at 5 days    Culture - Blood (collected 05-31-24 @ 05:00)  Source: .Blood None  Final Report (06-05-24 @ 14:00):    No growth at 5 days    Culture - Fungal, Body Fluid (collected 05-30-24 @ 13:54)  Source: Pleural Fl Pleural Fluid  Preliminary Report (06-01-24 @ 23:03):    Culture is being performed. Fungal cultures are held for 4 weeks.    Culture - Body Fluid with Gram Stain (collected 05-30-24 @ 13:54)  Source: Pleural Fl Pleural Fluid  Gram Stain (05-31-24 @ 03:18):    polymorphonuclear leukocytes seen    No organisms seen    by cytocentrifuge  Final Report (06-04-24 @ 18:31):    No growth at 5 days    Culture - Blood (collected 05-30-24 @ 02:13)  Source: .Blood Blood-Peripheral  Final Report (06-04-24 @ 10:01):    No growth at 5 days    Culture - Fungal, Body Fluid (collected 05-29-24 @ 17:00)  Source: .Body Fluid Other, Fluid  Preliminary Report (06-05-24 @ 23:03):    No fungus isolated at 1 week.    Culture - Acid Fast - Body Fluid w/Smear (collected 05-29-24 @ 17:00)  Source: Pericardial Other, Pericardial Fluid  Preliminary Report (06-05-24 @ 23:09):    No acid-fast bacilli isolated at 1 week. ****Acid-fast cultures are held    for 6 weeks.****    Culture - Body Fluid with Gram Stain (collected 05-29-24 @ 17:00)  Source: Pericardial Other, Pericardial Fluid  Gram Stain (05-30-24 @ 02:51):    polymorphonuclear leukocytes seen    No organisms seen    by cytocentrifuge  Final Report (06-03-24 @ 16:37):    No growth at 5 days    Culture - Blood (collected 05-28-24 @ 16:11)  Source: .Blood Blood  Gram Stain (05-30-24 @ 19:04):    Growth in anaerobic bottle: Gram Positive Cocci in Clusters  Final Report (05-31-24 @ 15:03):    Growth in anaerobic bottle: Staphylococcus hominis    Isolation of Coagulase negative Staphylococcus from single blood culture    sets may represent    contamination. Contact the Microbiology Department at [phone] if    susceptibility testing is    clinically indicated.    Direct identification is available within approximately 3-5    hours either by Blood Panel Multiplexed PCR or Direct    MALDI-TOF. Details: [email or web address]  Organism: Blood Culture PCR (05-31-24 @ 15:03)  Organism: Blood Culture PCR (05-31-24 @ 15:03)      Method Type: PCR      -  Coagulase negative Staphylococcus: Detec    Culture - Blood (collected 05-28-24 @ 16:11)  Source: .Blood Blood  Final Report (06-02-24 @ 23:00):    No growth at 5 days    Culture - Urine (collected 05-28-24 @ 15:29)  Source: Clean Catch Clean Catch (Midstream)  Final Report (05-30-24 @ 09:29):    No growth    Culture - Stool (collected 05-23-24 @ 17:06)  Source: .Stool Feces  Final Report (05-25-24 @ 17:57):    No enteric pathogens isolated.    (Stool culture examined for Salmonella,    Shigella, Campylobacter, Aeromonas, Plesiomonas,    Vibrio, E.coli O157 and Yersinia)    Culture - Blood (collected 05-19-24 @ 19:59)  Source: .Blood Blood  Final Report (05-25-24 @ 07:00):    No growth at 5 days    Culture - Blood (collected 05-19-24 @ 19:59)  Source: .Blood Blood  Final Report (05-25-24 @ 07:00):    No growth at 5 days            INFECTIOUS DISEASES TESTING  MRSA PCR Result.: Negative (06-02-24 @ 08:45)  Vancomycin Level, Trough: 14.9 (06-01-24 @ 16:00)  Procalcitonin: 0.14 (05-31-24 @ 16:41)  Fungitell: <31 (05-31-24 @ 16:41)  Procalcitonin: 0.25 (05-30-24 @ 15:53)  Rapid RVP Result: NotDetec (05-30-24 @ 07:12)  HIV-1/2 Combo Result: Nonreact (05-23-24 @ 11:50)  MRSA PCR Result.: Negative (05-20-24 @ 23:15)  Legionella Antigen, Urine: Negative (05-20-24 @ 16:53)  Streptococcus pneumoniae Ag, Ur Result: Negative (05-20-24 @ 16:53)  Procalcitonin: 0.06 (05-20-24 @ 06:31)      INFLAMMATORY MARKERS  Sedimentation Rate, Erythrocyte: 72 mm/Hr (05-29-24 @ 04:43)  C-Reactive Protein: 157.0 mg/L (05-29-24 @ 04:43)  Sedimentation Rate, Erythrocyte: 71 mm/Hr (05-20-24 @ 11:30)  C-Reactive Protein: 87.5 mg/L (05-20-24 @ 11:30)      RADIOLOGY & ADDITIONAL TESTS:  I have personally reviewed the last available Chest xray  CXR      CT      CARDIOLOGY TESTING  12 Lead ECG:   Ventricular Rate 87 BPM    Atrial Rate 87 BPM    P-R Interval 140 ms    QRS Duration 78 ms    Q-T Interval 350 ms    QTC Calculation(Bazett) 421 ms    P Axis 63 degrees    R Axis 18 degrees    T Axis 18 degrees    Diagnosis Line Normal sinus rhythm  Possible Left atrial enlargement  Cannot rule out Anterior infarct , age undetermined  Abnormal ECG    Confirmed by Hayden Saldana ([id number]) on 6/6/2024 12:21:48 PM (06-06-24 @ 10:33)  12 Lead ECG:   Ventricular Rate 79 BPM    Atrial Rate 79 BPM    P-R Interval 134 ms    QRS Duration 78 ms    Q-T Interval 378 ms    QTC Calculation(Bazett) 433 ms    P Axis 77 degrees    R Axis 65 degrees    T Axis 71 degrees    Diagnosis Line Normal sinus rhythm  Normal ECG    Confirmed by heather pope ([id number]) on 6/4/2024 9:37:28 PM (06-04-24 @ 13:16)      MEDICATIONS  albuterol/ipratropium for Nebulization 3 Nebulizer every 8 hours  ALPRAZolam 1 Oral four times a day  budesonide  80 MICROgram(s)/formoterol 4.5 MICROgram(s) Inhaler 2 Inhalation two times a day  chlorhexidine 2% Cloths 1 Topical <User Schedule>  cholecalciferol 2000 Oral daily  colchicine 0.6 Oral two times a day  dextrose 10% Bolus 125 IV Bolus once  dextrose 5%. 1000 IV Continuous <Continuous>  dextrose 5%. 1000 IV Continuous <Continuous>  dextrose 50% Injectable 25 IV Push once  dextrose 50% Injectable 12.5 IV Push once  enoxaparin Injectable 110 SubCutaneous <User Schedule>  folic acid 1 Oral daily  glucagon  Injectable 1 IntraMuscular once  insulin lispro (ADMELOG) corrective regimen sliding scale  SubCutaneous three times a day before meals  pantoprazole    Tablet 40 Oral before breakfast  predniSONE   Tablet 40 Oral daily  sodium chloride 3%  Inhalation 4 Inhalation every 12 hours  tiotropium 2.5 MICROgram(s) Inhaler 2 Inhalation daily      WEIGHT  Weight (kg): 80.7 (05-29-24 @ 07:16)  Creatinine: 1.2 mg/dL (06-06-24 @ 07:09)      ANTIBIOTICS:      All available historical records have been reviewed

## 2024-06-06 NOTE — PROGRESS NOTE ADULT - ASSESSMENT
54 y/o F PMHx  of HTN, HLD, COPD on home O2, SLE, lupus anticoagulant, antiphospholipid syndrome,  rheumatoid arthritis, cervical cancer sp hysterectomy,  laryngeal cancer s/p CT, RT, HFpEF (06/21/19 EF 63%) s/p AICD+PPM, DVT/PE (on lovenox QD),  NSTEMI in 2015, subclavian stenosis s/p stent placement presenting for shortness of breath and pleuritic chest pain.     # Large pericardial effusion, early tamponade physiology present based on bedside ECHO  # HFpEF  # Hx of Torsades  # NSTEMI in 2015  - Underwent successful pericardiocentesis on 5/29/24, s/p pericardial drain with 950 cc bloody output on 5/29. drain output 40cc since insertion, drain removed 5/30.   - Limited Echo 5/30 with normal function and small pericardial effusion.  - Repeat ECHO 6/5: EF 58 %, No evidence of pericardial effusion.   - Cardio recommending high dose ASA, 650 mg TID for at least the next week for pericardial pain, patient has been off aspirin for the last 2 years due to a episode of UGIB, will hold off on aspirin.   Plan:  - Continue colchicine 0.6 mg BID for at least 3 months    # Left pleural effusion moderate s/p thoracentesis  # Left lung atelectasis, improved  # Hx COPD on home O2  - s/p thoracentesis 650 cc cloudy fluid drained, exudative  - Currently saturating 94% on 2 lpm NC   - s/p course of solumedrol 60 -> c/w prednisone 40 mg daily   - c/w spiriva and symbicort  - Nebs q6  - Chest PT    # Anti-phospholipid syndrome  # Extensive history of DVTs, recent in stent (subclavian vein) thrombosis  # Subcutaneous hematomas and bruising due to supratherapeutic INR  # Cerebral hemorrhage from Coumadin  - On Lovenox 110 mg sc daily at home as decided by primary hem-onc team outpatient  - c/w home dose of lovenox 110 mg sc daily    # Chronic morphine for pain   - Pain control per home meds.   - c/w morphine IR 15 mg q12  - c/w morphine ER 30 mg q6    # Fever with Pericarditis and Pleural effusion , r/o infectious etiology  - Infectious workup so far has been negative  - Blood culture, procalcitonin, MRSA, Legionella, Strep, Parvovirus, Coxsackie, Adenovirus : Negative  - complete empiric 7 days of antibiotics , on zosyn end 6/6  - f/u histoplasma, QuantiFeron, pericardial viral culture    Miscellaneous:  DVT prophylaxis: Lovenox  Bowel  - Feeds: DASH/TLC  - Prophylaxis: Pantoprazole  - Regimen: None  Code status: Discharge tomorrow

## 2024-06-06 NOTE — PROGRESS NOTE ADULT - ASSESSMENT
ASSESSMENT  54 y/o F PMHx  of HTN, HLD, COPD on home O2, SLE, lupus anticoagulant, antiphospholipid syndrome,  rheumatoid arthritis, cervical cancer sp hysterectomy,  laryngeal cancer s/p CT, RT, HFpEF (06/21/19 EF 63%) s/p AICD+PPM, DVT/PE (on lovenox QD),  NSTEMI in 2015, subclavian stenosis s/p stent placement presenting for shortness of breath and pleuritic chest pain. Pt reports on Wed she woke up with pleuritic chest pain and increased SOB. Pt with recent admission 1 week ago for Pneumonia, was DC from hospital on Sunday with oral Abx regimen.  ID consulted for fevers    ABX  ampicillin/sulbactam  IVPB 5/28-29  doxycycline IVPB 5/28-29  piperacillin/tazobactam IVPB.. 5/30-present  vancomycin  IVPB 5/30-present    IMPRESSION  #Fever + Pericarditis/tamponade & Pleural effusions, rule out infectious etiology   Possibly Coxsackie B related, titers positive, possible crossreactivity   Tm 5/28 100.9, 5/29 101.5, 5/30 103  WBC peak 5/29 17--> 10     5/30 RVP (-)    Procalcitonin: 0.25 ng/mL (05-30-24 @ 15:53) unremarkable     s/p thoracentesis 5/30 WBC 1798; 51% NP, 39% lymph. CX NG     s/p pericardiocentesis 5/29 pericardial fluid NG ; WBC 2654; 51% NP    Previous admission coxsackie B titers pan-positive     MRSA PCR Result.: Negative (05-20-24 @ 23:15)    Legionella Antigen, Urine: Negative (05-20-24 @ 16:53)    Streptococcus pneumoniae Ag, Ur Result: Negative (05-20-24 @ 16:53)    Parvovirus negative     Coxsackie B titers , pan +     TSH wnl     Procalcitonin: 0.06 (05-20-24 @ 06:31)  Repeat CT Chest Since CT chest performed on May 30, 2024;  Bilateral lower lobe, right middle and lingular segment atelectasis.  Scattered patchy groundglass opacities with the right middle and lower lobes.  Prominent mediastinal lymph nodes, possibly reactive.  Trace left pleural effusions.  Decreased pericardial effusion, now trace volume.  Near-complete resolution of previously seen right pleural effusion.  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      RECOMMENDATIONS  - Can complete empiric 7 days of antibiotics , on zosyn end 6/6  - Rheum consult  - repeat procalcitonin unremarkable      If any questions, please send a message or call on [facility] Teams  Please continue to update ID with any pertinent new laboratory, radiographic findings, or change in clinical status

## 2024-06-06 NOTE — PROGRESS NOTE ADULT - SUBJECTIVE AND OBJECTIVE BOX
ILEANA MCNEIL  [facility] 3A 019 A ([facility])        Patient was evaluated and examined  by bedside, tolerating diet well, c/o left sided pleuritic chest pain, no dyspnea, no hypoxia        REVIEW OF SYSTEMS:  please see pertinent positives mentioned above, all other 12 ROS negative      T(C): , Max: 36.7 (06-05-24 @ 14:48)  HR: 79 (06-06-24 @ 05:00)  BP: 138/78 (06-06-24 @ 05:00)  RR: 18 (06-06-24 @ 05:00)  SpO2: 99% (06-06-24 @ 05:00)  CAPILLARY BLOOD GLUCOSE      POCT Blood Glucose.: 154 mg/dL (06 Jun 2024 11:35)  POCT Blood Glucose.: 131 mg/dL (06 Jun 2024 08:04)  POCT Blood Glucose.: 205 mg/dL (05 Jun 2024 17:59)  POCT Blood Glucose.: 216 mg/dL (05 Jun 2024 16:35)  POCT Blood Glucose.: 191 mg/dL (05 Jun 2024 12:19)      PHYSICAL EXAM:  General: NAD, AAOX3, patient is sitting comfortably in bed  HEENT: AT, NC, Supple, NO JVD, NO CB  Lungs: good breath sounds B/L, no wheezing, no rhonchi  CVS: normal S1, S2, RRR, NO M/G/R  Abdomen: soft, bowel sounds present, non-tender, non-distended  Extremities: no edema, no clubbing, no cyanosis, positive peripheral pulses b/l  Neuro: no acute focal neurological deficits  Skin: no rash, no ecchymosis      LAB  CBC  Date: 06-06-24 @ 07:09  Mean cell Weozyerxye88.9  Mean cell Hemoglobin Conc32.5  Mean cell Volum 89.0  Platelet count-Automate 493  RBC Count 4.18  Red Cell Distrib Width13.9  WBC Count19.59  % Albumin, Urine--  Hematocrit 37.2  Hemoglobin 12.1  CBC  Date: 06-05-24 @ 07:40  Mean cell Uqdrxbtdca49.1  Mean cell Hemoglobin Conc32.6  Mean cell Volum 89.4  Platelet count-Automate 452  RBC Count 3.85  Red Cell Distrib Width13.6  WBC Count17.72  % Albumin, Urine--  Hematocrit 34.4  Hemoglobin 11.2  CBC  Date: 06-04-24 @ 07:48  Mean cell Bllnkqilfw94.4  Mean cell Hemoglobin Conc32.5  Mean cell Volum 90.4  Platelet count-Automate 403  RBC Count 3.64  Red Cell Distrib Width13.6  WBC Count16.93  % Albumin, Urine--  Hematocrit 32.9  Hemoglobin 10.7  CBC  Date: 06-03-24 @ 04:33  Mean cell Amcagocwti58.5  Mean cell Hemoglobin Conc31.0  Mean cell Volum 91.8  Platelet count-Automate 329  RBC Count 3.65  Red Cell Distrib Width13.5  WBC Count13.20  % Albumin, Urine--  Hematocrit 33.5  Hemoglobin 10.4  CBC  Date: 06-02-24 @ 04:38  Mean cell Umboieedvf54.5  Mean cell Hemoglobin Conc31.4  Mean cell Volum 90.8  Platelet count-Automate 260  RBC Count 3.37  Red Cell Distrib Width13.2  WBC Count18.02  % Albumin, Urine--  Hematocrit 30.6  Hemoglobin 9.6  CBC  Date: 06-01-24 @ 04:43  Mean cell Raywbzoyuu39.0  Mean cell Hemoglobin Conc32.9  Mean cell Volum 88.3  Platelet count-Automate 308  RBC Count 3.34  Red Cell Distrib Width13.1  WBC Count12.26  % Albumin, Urine--  Hematocrit 29.5  Hemoglobin 9.7  CBC  Date: 05-31-24 @ 05:00  Mean cell Cyhhmwatvk77.3  Mean cell Hemoglobin Conc31.5  Mean cell Volum 89.9  Platelet count-Automate 259  RBC Count 3.36  Red Cell Distrib Width13.4  WBC Count10.27  % Albumin, Urine--  Hematocrit 30.2  Hemoglobin 9.5  CBC  Date: 05-30-24 @ 21:50  Mean cell Vwedaqkysb28.5  Mean cell Hemoglobin Conc33.2  Mean cell Volum 88.8  Platelet count-Automate 255  RBC Count 3.22  Red Cell Distrib Width13.5  WBC Count11.89  % Albumin, Urine--  Hematocrit 28.6  Hemoglobin 9.5    BMP  06-06-24 @ 07:09  Blood Gas Arterial-Calcium,Ionized--  Blood Urea Nitrogen, Serum 27 mg/dL<H> [10 - 20]  Carbon Dioxide, Serum25 mmol/L [17 - 32]  Chloride, Serum99 mmol/L [98 - 110]  Creatinie, Serum1.2 mg/dL [0.7 - 1.5]  Glucose, Cwxbh612 mg/dL<H> [70 - 99]  Potassium, Serum4.3 mmol/L [3.5 - 5.0]  Sodium, Serum 137 mmol/L [135 - 146]  [facility]  06-05-24 @ 07:40  Blood Gas Arterial-Calcium,Ionized--  Blood Urea Nitrogen, Serum 27 mg/dL<H> [10 - 20]  Carbon Dioxide, Serum24 mmol/L [17 - 32]  Chloride, Bnodm407 mmol/L [98 - 110]  Creatinie, Serum1.3 mg/dL [0.7 - 1.5]  Glucose, Uskju967 mg/dL<H> [70 - 99]  Potassium, Serum4.4 mmol/L [3.5 - 5.0]  Sodium, Serum 138 mmol/L [135 - 146]  [facility]  06-04-24 @ 07:48  Blood Gas Arterial-Calcium,Ionized--  Blood Urea Nitrogen, Serum 22 mg/dL<H> [10 - 20]  Carbon Dioxide, Serum25 mmol/L [17 - 32]  Chloride, Jsttu173 mmol/L [98 - 110]  Creatinie, Serum1.1 mg/dL [0.7 - 1.5]  Glucose, Ftopx385 mg/dL<H> [70 - 99]  Potassium, Serum4.8 mmol/L [3.5 - 5.0]  Sodium, Serum 140 mmol/L [135 - 146]  [facility]  06-03-24 @ 04:33  Blood Gas Arterial-Calcium,Ionized--  Blood Urea Nitrogen, Serum 24 mg/dL<H> [10 - 20]  Carbon Dioxide, Serum24 mmol/L [17 - 32]  Chloride, Pzvsu658 mmol/L [98 - 110]  Creatinie, Serum1.3 mg/dL [0.7 - 1.5]  Glucose, Vqtsb708 mg/dL<H> [70 - 99]  Potassium, Serum3.9 mmol/L [3.5 - 5.0]  Sodium, Serum 140 mmol/L [135 - 146]  [facility]  06-02-24 @ 04:38  Blood Gas Arterial-Calcium,Ionized--  Blood Urea Nitrogen, Serum 22 mg/dL<H> [10 - 20]  Carbon Dioxide, Serum23 mmol/L [17 - 32]  Chloride, Krxif644 mmol/L [98 - 110]  Creatinie, Serum1.5 mg/dL [0.7 - 1.5]  Glucose, Zypgj712 mg/dL<H> [70 - 99]  Potassium, Serum4.5 mmol/L [3.5 - 5.0]  Sodium, Serum 139 mmol/L [135 - 146]        PTT - ( 05 Jun 2024 07:40 )  PTT:89.4 sec      Microbiology:    Culture - Blood (collected 06-01-24 @ 04:43)  Source: .Blood None  Preliminary Report (06-05-24 @ 17:01):    No growth at 4 days    Culture - Blood (collected 05-31-24 @ 16:41)  Source: .Blood None  Final Report (06-06-24 @ 01:00):    No growth at 5 days    Culture - Blood (collected 05-31-24 @ 11:40)  Source: .Blood None  Final Report (06-05-24 @ 22:00):    No growth at 5 days    Culture - Blood (collected 05-31-24 @ 05:00)  Source: .Blood None  Final Report (06-05-24 @ 14:00):    No growth at 5 days    Culture - Fungal, Body Fluid (collected 05-30-24 @ 13:54)  Source: Pleural Fl Pleural Fluid  Preliminary Report (06-01-24 @ 23:03):    Culture is being performed. Fungal cultures are held for 4 weeks.    Culture - Body Fluid with Gram Stain (collected 05-30-24 @ 13:54)  Source: Pleural Fl Pleural Fluid  Gram Stain (05-31-24 @ 03:18):    polymorphonuclear leukocytes seen    No organisms seen    by cytocentrifuge  Final Report (06-04-24 @ 18:31):    No growth at 5 days    Culture - Blood (collected 05-30-24 @ 02:13)  Source: .Blood Blood-Peripheral  Final Report (06-04-24 @ 10:01):    No growth at 5 days    Culture - Fungal, Body Fluid (collected 05-29-24 @ 17:00)  Source: .Body Fluid Other, Fluid  Preliminary Report (06-05-24 @ 23:03):    No fungus isolated at 1 week.    Culture - Acid Fast - Body Fluid w/Smear (collected 05-29-24 @ 17:00)  Source: Pericardial Other, Pericardial Fluid  Preliminary Report (06-05-24 @ 23:09):    No acid-fast bacilli isolated at 1 week. ****Acid-fast cultures are held    for 6 weeks.****    Culture - Body Fluid with Gram Stain (collected 05-29-24 @ 17:00)  Source: Pericardial Other, Pericardial Fluid  Gram Stain (05-30-24 @ 02:51):    polymorphonuclear leukocytes seen    No organisms seen    by cytocentrifuge  Final Report (06-03-24 @ 16:37):    No growth at 5 days    Culture - Blood (collected 05-28-24 @ 16:11)  Source: .Blood Blood  Gram Stain (05-30-24 @ 19:04):    Growth in anaerobic bottle: Gram Positive Cocci in Clusters  Final Report (05-31-24 @ 15:03):    Growth in anaerobic bottle: Staphylococcus hominis    Isolation of Coagulase negative Staphylococcus from single blood culture    sets may represent    contamination. Contact the Microbiology Department at [phone] if    susceptibility testing is    clinically indicated.    Direct identification is available within approximately 3-5    hours either by Blood Panel Multiplexed PCR or Direct    MALDI-TOF. Details: [email or web address]  Organism: Blood Culture PCR (05-31-24 @ 15:03)  Organism: Blood Culture PCR (05-31-24 @ 15:03)      Method Type: PCR      -  Coagulase negative Staphylococcus: Detec    Culture - Blood (collected 05-28-24 @ 16:11)  Source: .Blood Blood  Final Report (06-02-24 @ 23:00):    No growth at 5 days    Culture - Urine (collected 05-28-24 @ 15:29)  Source: Clean Catch Clean Catch (Midstream)  Final Report (05-30-24 @ 09:29):    No growth    Culture - Stool (collected 05-23-24 @ 17:06)  Source: .Stool Feces  Final Report (05-25-24 @ 17:57):    No enteric pathogens isolated.    (Stool culture examined for Salmonella,    Shigella, Campylobacter, Aeromonas, Plesiomonas,    Vibrio, E.coli O157 and Yersinia)    Culture - Blood (collected 05-19-24 @ 19:59)  Source: .Blood Blood  Final Report (05-25-24 @ 07:00):    No growth at 5 days    Culture - Blood (collected 05-19-24 @ 19:59)  Source: .Blood Blood  Final Report (05-25-24 @ 07:00):    No growth at 5 days      Medications:  acetaminophen     Tablet .. 650 milliGRAM(s) Oral every 6 hours PRN  albuterol    90 MICROgram(s) HFA Inhaler 2 Puff(s) Inhalation every 6 hours PRN  albuterol/ipratropium for Nebulization 3 milliLiter(s) Nebulizer every 8 hours  ALPRAZolam 1 milliGRAM(s) Oral four times a day  aluminum hydroxide/magnesium hydroxide/simethicone Suspension 30 milliLiter(s) Oral every 4 hours PRN  budesonide  80 MICROgram(s)/formoterol 4.5 MICROgram(s) Inhaler 2 Puff(s) Inhalation two times a day  chlorhexidine 2% Cloths 1 Application(s) Topical <User Schedule>  cholecalciferol 2000 Unit(s) Oral daily  colchicine 0.6 milliGRAM(s) Oral two times a day  dextrose 10% Bolus 125 milliLiter(s) IV Bolus once  dextrose 5%. 1000 milliLiter(s) IV Continuous <Continuous>  dextrose 5%. 1000 milliLiter(s) IV Continuous <Continuous>  dextrose 50% Injectable 25 Gram(s) IV Push once  dextrose 50% Injectable 12.5 Gram(s) IV Push once  dextrose Oral Gel 15 Gram(s) Oral once PRN  enoxaparin Injectable 110 milliGRAM(s) SubCutaneous <User Schedule>  famotidine    Tablet 20 milliGRAM(s) Oral daily PRN  folic acid 1 milliGRAM(s) Oral daily  glucagon  Injectable 1 milliGRAM(s) IntraMuscular once  insulin lispro (ADMELOG) corrective regimen sliding scale   SubCutaneous three times a day before meals  loperamide 2 milliGRAM(s) Oral every 8 hours PRN  melatonin 3 milliGRAM(s) Oral at bedtime PRN  morphine  IR 15 milliGRAM(s) Oral two times a day PRN  morphine ER Tablet 30 milliGRAM(s) Oral four times a day PRN  pantoprazole    Tablet 40 milliGRAM(s) Oral before breakfast  predniSONE   Tablet 40 milliGRAM(s) Oral daily  sodium chloride 3%  Inhalation 4 milliLiter(s) Inhalation every 12 hours  tiotropium 2.5 MICROgram(s) Inhaler 2 Puff(s) Inhalation daily        Assessment and Plan:  Patient is a 54 y/o Female with PMHx  of HTN, HLD, COPD on home O2, SLE, lupus anticoagulant, antiphospholipid syndrome,  rheumatoid arthritis, cervical cancer sp hysterectomy,  laryngeal cancer s/p CT, RT, HFpEF (06/21/19 EF 63%) s/p AICD+PPM, DVT/PE (on lovenox QD),  NSTEMI in 2015, subclavian stenosis s/p stent placement presenting for shortness of breath and pleuritic chest pain. Pt reports on Wed she woke up with pleuritic chest pain and increased SOB. Pt with recent admission 1 week ago for Pneumonia, was DC from hospital on Sunday with oral Abx regimen. States she did not feel well at home, continued to have SOB, intermittent fevers, and increasing need for O2 so she came back to the hospital.    # Large pericardial effusion, early tamponade physiology present based on bedside ECHO  # HFpEF  # Hx of Torsades  # NSTEMI in 2015  - Underwent successful pericardiocentesis on 5/29/24, s/p pericardial drain with 950 cc bloody output on 5/29. drain output 40cc since insertion, drain removed 5/30.   - Continue colchicine 0.6 mg BID for at least 3 months  - patient refusing to take   mg TID as  was advised by Cardio, reporting that she will develop gastric ulcer. this was not recommended by [facility]. team.  -6/5/24- patient c/o chest and back pain, dyspnea at rest, repeated limited echo was repeated showed no pericardial effusion, repeated CXR- no significant increase in left lung effusion.   -patient was instructed to be compliant with medical tx. and f/up with outpatient Cardiology specialist for further management and tx.     # Left pleural effusion moderate s/p thoracentesis  # Left lung atelectasis, improved  # Hx COPD on home O2  - s/p thoracentesis 650 cc cloudy fluid drained  - Currently saturating 94% on RA  - 6/5/24 transitioned to PO Prednisone 40 mg once daily x 5 more days  - c/w spiriva and symbicort,  Nebs q6  - outpatient Pulmonary specialist f/up     # Leukocytosis- most likely due to steroids tx, pt. clinically remains afebrile, repeat CBC in am       # Anti-phospholipid syndrome  # Extensive history of DVTs, recent in stent (subclavian vein) thrombosis  # Cerebral hemorrhage from Coumadin  - On Lovenox 110 mg sc daily at home- resumed on Lovenox tx. from 6/5/24  -close outpatient CBC monitoring, outpatient  Neuro, Rheumatology f/up     # Chronic morphine for pain   - Pain control per home meds.   - c/w morphine IR 15 mg q12  - c/w morphine ER 30 mg q6    # Anxiety : on chronic alprazolam tx. at home     # Fever with Pericarditis and Pleural effusion , no  infectious etiology  - Blood culture, procalcitonin, MRSA, Legionella, Strep, Parvovirus neg , Coxsackie ab's positive   - completing  empiric 7 days of antibiotics , on zosyn ended  6/6      DVT prophylaxis: therapeutic lovenox tx.    - Feeds: DASH/TLC  - Prophylaxis: Pantoprazole  Code status: Full .    #Progress Note Handoff: patient was medically optimized, stable for d/c home tomorrow, anticipated for d/c , f/up CBC in am to review WBC level   Family discussion: current medical plan of tx. d/w patient by bedside Disposition: Home__ tomorrow    Total time spent to complete patient's bedside assessment, review medical chart, discuss medical plan of care with covering medical team was more than 35 minutes with >50% of time spent face to face with patient, discussion with patient/family and/or coordination of care .

## 2024-06-06 NOTE — PROGRESS NOTE ADULT - SUBJECTIVE AND OBJECTIVE BOX
Over Night Events: events noted, on RA, afebrile, still multiple BM    PHYSICAL EXAM    ICU Vital Signs Last 24 Hrs  T(C): 36.7 (06 Jun 2024 05:00), Max: 36.7 (05 Jun 2024 14:48)  T(F): 98 (06 Jun 2024 05:00), Max: 98.1 (05 Jun 2024 14:48)  HR: 79 (06 Jun 2024 05:00) (79 - 83)  BP: 138/78 (06 Jun 2024 05:00) (138/78 - 175/90)  RR: 18 (06 Jun 2024 05:00) (18 - 18)  SpO2: 99% (06 Jun 2024 05:00) (97% - 99%)    O2 Parameters below as of 06 Jun 2024 05:00  Patient On (Oxygen Delivery Method): nasal cannula  O2 Flow (L/min): 3          General: ILL looking  Lungs: dec bs l side  Cardiovascular: Regular   Abdomen: Soft, Positive BS  Extremities: No clubbing   Skin: Warm  Neurological: Non focal         LABS:                          12.1   19.59 )-----------( 493      ( 06 Jun 2024 07:09 )             37.2                                               06-05    138  |  100  |  27<H>  ----------------------------<  161<H>  4.4   |  24  |  1.3    Ca    9.6      05 Jun 2024 07:40  Mg     2.0     06-05    TPro  6.3  /  Alb  3.7  /  TBili  <0.2  /  DBili  x   /  AST  13  /  ALT  21  /  AlkPhos  116<H>  06-05      PTT - ( 05 Jun 2024 07:40 )  PTT:89.4 sec                                       Urinalysis Basic - ( 05 Jun 2024 07:40 )    Color: x / Appearance: x / SG: x / pH: x  Gluc: 161 mg/dL / Ketone: x  / Bili: x / Urobili: x   Blood: x / Protein: x / Nitrite: x   Leuk Esterase: x / RBC: x / WBC x   Sq Epi: x / Non Sq Epi: x / Bacteria: x                                                  LIVER FUNCTIONS - ( 05 Jun 2024 07:40 )  Alb: 3.7 g/dL / Pro: 6.3 g/dL / ALK PHOS: 116 U/L / ALT: 21 U/L / AST: 13 U/L / GGT: x                                                                                                                                       MEDICATIONS  (STANDING):  albuterol/ipratropium for Nebulization 3 milliLiter(s) Nebulizer every 8 hours  ALPRAZolam 1 milliGRAM(s) Oral four times a day  budesonide  80 MICROgram(s)/formoterol 4.5 MICROgram(s) Inhaler 2 Puff(s) Inhalation two times a day  chlorhexidine 2% Cloths 1 Application(s) Topical <User Schedule>  cholecalciferol 2000 Unit(s) Oral daily  colchicine 0.6 milliGRAM(s) Oral two times a day  dextrose 10% Bolus 125 milliLiter(s) IV Bolus once  dextrose 5%. 1000 milliLiter(s) (50 mL/Hr) IV Continuous <Continuous>  dextrose 5%. 1000 milliLiter(s) (100 mL/Hr) IV Continuous <Continuous>  dextrose 50% Injectable 25 Gram(s) IV Push once  dextrose 50% Injectable 12.5 Gram(s) IV Push once  enoxaparin Injectable 110 milliGRAM(s) SubCutaneous <User Schedule>  folic acid 1 milliGRAM(s) Oral daily  glucagon  Injectable 1 milliGRAM(s) IntraMuscular once  insulin lispro (ADMELOG) corrective regimen sliding scale   SubCutaneous three times a day before meals  pantoprazole    Tablet 40 milliGRAM(s) Oral before breakfast  predniSONE   Tablet 40 milliGRAM(s) Oral daily  sodium chloride 3%  Inhalation 4 milliLiter(s) Inhalation every 12 hours  tiotropium 2.5 MICROgram(s) Inhaler 2 Puff(s) Inhalation daily    MEDICATIONS  (PRN):  acetaminophen     Tablet .. 650 milliGRAM(s) Oral every 6 hours PRN Temp greater or equal to 38C (100.4F), Severe Pain (7 - 10)  albuterol    90 MICROgram(s) HFA Inhaler 2 Puff(s) Inhalation every 6 hours PRN Shortness of Breath and/or Wheezing  aluminum hydroxide/magnesium hydroxide/simethicone Suspension 30 milliLiter(s) Oral every 4 hours PRN Dyspepsia  dextrose Oral Gel 15 Gram(s) Oral once PRN Blood Glucose LESS THAN 70 milliGRAM(s)/deciliter  famotidine    Tablet 20 milliGRAM(s) Oral daily PRN dyspepsia  loperamide 2 milliGRAM(s) Oral every 8 hours PRN Diarrhea  melatonin 3 milliGRAM(s) Oral at bedtime PRN Insomnia  morphine  IR 15 milliGRAM(s) Oral two times a day PRN Severe Pain (7 - 10)  morphine ER Tablet 30 milliGRAM(s) Oral four times a day PRN severe pain

## 2024-06-06 NOTE — PROGRESS NOTE ADULT - SUBJECTIVE AND OBJECTIVE BOX
CHIEF COMPLAINT:Patient is a 53y old Female who presents with a chief complaint of Large pericardial effusion (06 Jun 2024 12:01)    Primary diagnosis of Acute dyspnea        OVERNIGHT EVENTS: No events    INTERVAL HPI: No new complaints    PHYSICAL EXAMINATION:   CONSTITUTIONAL: Not in acute distress  NEUROLOGICAL: Alert and oriented x 3  EYES: Pupils equal, Round and reactive to light.  CARDIAC: Normal rate, Regular rhythm.    RESPIRATORY: No wheezing, No crackles, Normal chest expansion, Not tachypneic, No use of accessory muscles  GASTROINTESTINAL: Abdomen soft, Non-tender, No guarding, + BS  SKIN: Skin intact  GENITOURINARY: WNL  EXTREMITIES:  2+ Peripheral Pulses, No clubbing, cyanosis, or edema    PAST MEDICAL & SURGICAL HISTORY  Lupus    RA (rheumatoid arthritis)    HTN (hypertension)    CHF (congestive heart failure)    COPD (chronic obstructive pulmonary disease)    DVT (deep venous thrombosis)    Pulmonary embolism    History of laryngeal cancer    GERD (gastroesophageal reflux disease)    Cervical cancer    APS (antiphospholipid syndrome)    S/P appendectomy    S/P cholecystectomy    AICD (automatic cardioverter/defibrillator) present  [employer]    S/P hysterectomy    History of back surgery    H/O foot surgery    S/P eye surgery    Subclavian arterial stenosis      SOCIAL HISTORY:  Social History:      ALLERGIES:  Vantin (Other (Mild))  Plaquenil (Other)  adhesives (Rash; Urticaria)  Avelox (Other)  Arava (Anaphylaxis; Angioedema)      VITALS:   T(F): 98  HR: 79  BP: 138/78  RR: 18  SpO2: 99%    LABS:                        12.1   19.59 )-----------( 493      ( 06 Jun 2024 07:09 )             37.2     06-06    137  |  99  |  27<H>  ----------------------------<  135<H>  4.3   |  25  |  1.2    Ca    9.8      06 Jun 2024 07:09  Mg     1.8     06-06    TPro  6.5  /  Alb  3.8  /  TBili  <0.2  /  DBili  x   /  AST  10  /  ALT  17  /  AlkPhos  96  06-06    PTT - ( 05 Jun 2024 07:40 )  PTT:89.4 sec  Urinalysis Basic - ( 06 Jun 2024 07:09 )    Color: x / Appearance: x / SG: x / pH: x  Gluc: 135 mg/dL / Ketone: x  / Bili: x / Urobili: x   Blood: x / Protein: x / Nitrite: x   Leuk Esterase: x / RBC: x / WBC x   Sq Epi: x / Non Sq Epi: x / Bacteria: x                  HOME MEDICATIONS:  albuterol 90 mcg/inh inhalation aerosol: 2 puff(s) inhaled every 6 hours, As Needed  cholecalciferol 125 mcg (5000 intl units) oral tablet: 1 tab(s) orally once a day  cyanocobalamin 1000 mcg/mL injectable solution: 1 milliliter(s) injectable once a week  folic acid 1 mg oral tablet: 1 tab(s) orally once a day  Lovenox 100 mg/mL injectable solution: 110 milligram(s) subcutaneously once a day (at bedtime)  Metoprolol Succinate  mg oral tablet, extended release: 1 tab(s) orally once a day  morphine 15 mg oral capsule: 1 tab(s) orally 2 times a day as needed for  severe pain  MS Contin 30 mg oral tablet, extended release: 1 tab(s) orally 7 times a day  Trelegy Ellipta 100 mcg-62.5 mcg-25 mcg/inh inhalation powder: 1 puff(s) inhaled once a day  Xanax 1 mg oral tablet: 1 tab(s) orally 4 times a day      MEDICATIONS:  STANDING MEDICATIONS  albuterol/ipratropium for Nebulization 3 milliLiter(s) Nebulizer every 8 hours  ALPRAZolam 1 milliGRAM(s) Oral four times a day  budesonide  80 MICROgram(s)/formoterol 4.5 MICROgram(s) Inhaler 2 Puff(s) Inhalation two times a day  chlorhexidine 2% Cloths 1 Application(s) Topical <User Schedule>  cholecalciferol 2000 Unit(s) Oral daily  colchicine 0.6 milliGRAM(s) Oral two times a day  dextrose 10% Bolus 125 milliLiter(s) IV Bolus once  dextrose 5%. 1000 milliLiter(s) IV Continuous <Continuous>  dextrose 5%. 1000 milliLiter(s) IV Continuous <Continuous>  dextrose 50% Injectable 25 Gram(s) IV Push once  dextrose 50% Injectable 12.5 Gram(s) IV Push once  enoxaparin Injectable 110 milliGRAM(s) SubCutaneous <User Schedule>  folic acid 1 milliGRAM(s) Oral daily  glucagon  Injectable 1 milliGRAM(s) IntraMuscular once  insulin lispro (ADMELOG) corrective regimen sliding scale   SubCutaneous three times a day before meals  pantoprazole    Tablet 40 milliGRAM(s) Oral before breakfast  predniSONE   Tablet 40 milliGRAM(s) Oral daily  sodium chloride 3%  Inhalation 4 milliLiter(s) Inhalation every 12 hours  tiotropium 2.5 MICROgram(s) Inhaler 2 Puff(s) Inhalation daily    PRN MEDICATIONS  acetaminophen     Tablet .. 650 milliGRAM(s) Oral every 6 hours PRN  albuterol    90 MICROgram(s) HFA Inhaler 2 Puff(s) Inhalation every 6 hours PRN  aluminum hydroxide/magnesium hydroxide/simethicone Suspension 30 milliLiter(s) Oral every 4 hours PRN  dextrose Oral Gel 15 Gram(s) Oral once PRN  famotidine    Tablet 20 milliGRAM(s) Oral daily PRN  loperamide 2 milliGRAM(s) Oral every 8 hours PRN  melatonin 3 milliGRAM(s) Oral at bedtime PRN  morphine  IR 15 milliGRAM(s) Oral two times a day PRN  morphine ER Tablet 30 milliGRAM(s) Oral four times a day PRN

## 2024-06-06 NOTE — PROGRESS NOTE ADULT - ASSESSMENT
Impression:     Left pleural effusion - moderate improved  Large pericardial effusion s/p pericardiocentesis resolved  Left lung atelectasis improved  diarrhea on colchicine  Acute hypoxemic respiratory failure improved on RA  History of COPD   SLE, lupus AC, APLS on AC   History of cervical and laryngeal cancer   TIAN     Plan:     S/P thoracentesis/ cx/ cyto noted  repeat ct scan reviewed  PPI  prednisone 40 daily taper slowly  CXR noted  Cardio fup  full AC  Rheumato fup  Chest PT   Neb q 6 h  pulmonary standpoint attempt dc in am, fup wbc

## 2024-06-07 ENCOUNTER — APPOINTMENT (OUTPATIENT)
Dept: PULMONOLOGY | Facility: CLINIC | Age: 54
End: 2024-06-07

## 2024-06-07 ENCOUNTER — TRANSCRIPTION ENCOUNTER (OUTPATIENT)
Age: 54
End: 2024-06-07

## 2024-06-07 VITALS
DIASTOLIC BLOOD PRESSURE: 84 MMHG | OXYGEN SATURATION: 98 % | SYSTOLIC BLOOD PRESSURE: 161 MMHG | TEMPERATURE: 98 F | HEART RATE: 96 BPM

## 2024-06-07 LAB
GAMMA INTERFERON BACKGROUND BLD IA-ACNC: 0.05 IU/ML — SIGNIFICANT CHANGE UP
GLUCOSE BLDC GLUCOMTR-MCNC: 160 MG/DL — HIGH (ref 70–99)
GLUCOSE BLDC GLUCOMTR-MCNC: 210 MG/DL — HIGH (ref 70–99)
HCT VFR BLD CALC: 38.2 % — SIGNIFICANT CHANGE UP (ref 37–47)
HGB BLD-MCNC: 12.5 G/DL — SIGNIFICANT CHANGE UP (ref 12–16)
M TB IFN-G BLD-IMP: ABNORMAL
M TB IFN-G CD4+ BCKGRND COR BLD-ACNC: 0 IU/ML — SIGNIFICANT CHANGE UP
M TB IFN-G CD4+CD8+ BCKGRND COR BLD-ACNC: 0 IU/ML — SIGNIFICANT CHANGE UP
MCHC RBC-ENTMCNC: 29 PG — SIGNIFICANT CHANGE UP (ref 27–31)
MCHC RBC-ENTMCNC: 32.7 G/DL — SIGNIFICANT CHANGE UP (ref 32–37)
MCV RBC AUTO: 88.6 FL — SIGNIFICANT CHANGE UP (ref 81–99)
NRBC # BLD: 0 /100 WBCS — SIGNIFICANT CHANGE UP (ref 0–0)
PLATELET # BLD AUTO: 409 K/UL — HIGH (ref 130–400)
PMV BLD: 10.4 FL — SIGNIFICANT CHANGE UP (ref 7.4–10.4)
QUANT TB PLUS MITOGEN MINUS NIL: 0.31 IU/ML — SIGNIFICANT CHANGE UP
RBC # BLD: 4.31 M/UL — SIGNIFICANT CHANGE UP (ref 4.2–5.4)
RBC # FLD: 14 % — SIGNIFICANT CHANGE UP (ref 11.5–14.5)
WBC # BLD: 12.92 K/UL — HIGH (ref 4.8–10.8)
WBC # FLD AUTO: 12.92 K/UL — HIGH (ref 4.8–10.8)

## 2024-06-07 PROCEDURE — 99239 HOSP IP/OBS DSCHRG MGMT >30: CPT

## 2024-06-07 RX ORDER — PANTOPRAZOLE SODIUM 20 MG/1
1 TABLET, DELAYED RELEASE ORAL
Qty: 30 | Refills: 0
Start: 2024-06-07 | End: 2024-07-06

## 2024-06-07 RX ORDER — DIPHENHYDRAMINE HYDROCHLORIDE AND LIDOCAINE HYDROCHLORIDE AND ALUMINUM HYDROXIDE AND MAGNESIUM HYDRO
10 KIT EVERY 4 HOURS
Refills: 0 | Status: DISCONTINUED | OUTPATIENT
Start: 2024-06-07 | End: 2024-06-07

## 2024-06-07 RX ORDER — FUROSEMIDE 40 MG
1 TABLET ORAL
Qty: 30 | Refills: 0
Start: 2024-06-07 | End: 2024-07-06

## 2024-06-07 RX ORDER — COLCHICINE 0.6 MG
1 TABLET ORAL
Qty: 60 | Refills: 0
Start: 2024-06-07 | End: 2024-07-06

## 2024-06-07 RX ORDER — MORPHINE SULFATE 50 MG/1
15 CAPSULE, EXTENDED RELEASE ORAL
Refills: 0 | Status: DISCONTINUED | OUTPATIENT
Start: 2024-06-07 | End: 2024-06-07

## 2024-06-07 RX ORDER — DIPHENHYDRAMINE HYDROCHLORIDE AND LIDOCAINE HYDROCHLORIDE AND ALUMINUM HYDROXIDE AND MAGNESIUM HYDRO
10 KIT
Qty: 560 | Refills: 0
Start: 2024-06-07 | End: 2024-06-20

## 2024-06-07 RX ORDER — NYSTATIN 500MM UNIT
5 POWDER (EA) MISCELLANEOUS
Qty: 280 | Refills: 0
Start: 2024-06-07 | End: 2024-06-20

## 2024-06-07 RX ORDER — NYSTATIN 500MM UNIT
500000 POWDER (EA) MISCELLANEOUS
Refills: 0 | Status: DISCONTINUED | OUTPATIENT
Start: 2024-06-07 | End: 2024-06-07

## 2024-06-07 RX ADMIN — Medication 1: at 13:33

## 2024-06-07 RX ADMIN — Medication 1 MILLIGRAM(S): at 12:56

## 2024-06-07 RX ADMIN — Medication 3 MILLILITER(S): at 08:37

## 2024-06-07 RX ADMIN — BUDESONIDE AND FORMOTEROL FUMARATE DIHYDRATE 2 PUFF(S): 160; 4.5 AEROSOL RESPIRATORY (INHALATION) at 10:07

## 2024-06-07 RX ADMIN — Medication 2: at 10:03

## 2024-06-07 RX ADMIN — MORPHINE SULFATE 15 MILLIGRAM(S): 50 CAPSULE, EXTENDED RELEASE ORAL at 06:54

## 2024-06-07 RX ADMIN — Medication 500000 UNIT(S): at 12:56

## 2024-06-07 RX ADMIN — ENOXAPARIN SODIUM 110 MILLIGRAM(S): 100 INJECTION SUBCUTANEOUS at 13:32

## 2024-06-07 RX ADMIN — SODIUM CHLORIDE 4 MILLILITER(S): 9 INJECTION INTRAMUSCULAR; INTRAVENOUS; SUBCUTANEOUS at 08:37

## 2024-06-07 RX ADMIN — CHLORHEXIDINE GLUCONATE 1 APPLICATION(S): 213 SOLUTION TOPICAL at 05:28

## 2024-06-07 RX ADMIN — MORPHINE SULFATE 30 MILLIGRAM(S): 50 CAPSULE, EXTENDED RELEASE ORAL at 10:02

## 2024-06-07 RX ADMIN — Medication 2 MILLIGRAM(S): at 02:56

## 2024-06-07 RX ADMIN — MORPHINE SULFATE 30 MILLIGRAM(S): 50 CAPSULE, EXTENDED RELEASE ORAL at 02:56

## 2024-06-07 RX ADMIN — DIPHENHYDRAMINE HYDROCHLORIDE AND LIDOCAINE HYDROCHLORIDE AND ALUMINUM HYDROXIDE AND MAGNESIUM HYDRO 10 MILLILITER(S): KIT at 10:04

## 2024-06-07 RX ADMIN — PANTOPRAZOLE SODIUM 40 MILLIGRAM(S): 20 TABLET, DELAYED RELEASE ORAL at 05:27

## 2024-06-07 RX ADMIN — Medication 40 MILLIGRAM(S): at 05:27

## 2024-06-07 RX ADMIN — Medication 2000 UNIT(S): at 12:56

## 2024-06-07 RX ADMIN — Medication 1 MILLIGRAM(S): at 05:27

## 2024-06-07 RX ADMIN — Medication 0.6 MILLIGRAM(S): at 05:27

## 2024-06-07 NOTE — DISCHARGE NOTE PROVIDER - CARE PROVIDERS DIRECT ADDRESSES
[email or web address]

## 2024-06-07 NOTE — DISCHARGE NOTE PROVIDER - NSDCFUSCHEDAPPT_GEN_ALL_CORE_FT
Unknown, Doctor  [facility]  [facility] PreAdmits  Scheduled Appointment: 06/25/2024    [facility] Physician [place] [facility] [address] Montez Harper  Scheduled Appointment: 06/25/2024

## 2024-06-07 NOTE — DISCHARGE NOTE PROVIDER - NSDCCPCAREPLAN_GEN_ALL_CORE_FT
PRINCIPAL DISCHARGE DIAGNOSIS  Diagnosis: Acute pericardial effusion  Assessment and Plan of Treatment: You presented with complaints of chest pain and shortness of breath. CT chest showed large pericardial effusion. You were admitted to CCU. Bedside Echo showed impending constriction of the heart because of large amoung of fluid in the sac overlying the heart. You underwent successful pericardiocentesis on 5/29/24, 950 cc of fluid was drained. Repeat Echo showed small pericardial effusion. You were downgraded ot medicine floor. Repeat Echo was normal showeing no evidence of pericardial effusion. Cardiology recommending aspirin for your pericardial pain but it was held because of your history of GI bleeding and to continue colchicine 0.6 mg BID for at least 3 months. Please continue taking all your medications and follow up with your cardiologist outpatient.      SECONDARY DISCHARGE DIAGNOSES  Diagnosis: Pleural effusion, left  Assessment and Plan of Treatment: You had accumulation of fluid in your pleural sac. Pulmonology consulted. Tube drainage of 650 cc was done. Your oxygenation improved. You were given IV steriods and will be discharged on oral prednisone. Please continue taking all your medications and follow up with your pulmonologist outpatient.    Diagnosis: Antiphospholipid syndrome  Assessment and Plan of Treatment: You were given heparin drip for anticoagulation. Hem-onc consulted and will be discharged on Lovenox sc that was advised by your hem-onc doctor. Please continue to follow them outpatient.    Diagnosis: Oral candida  Assessment and Plan of Treatment: You had symptoms of pain on swallowing. You had features suggestive of candidal infection. You will be discharged on Nystatin swish and swallow and magic mouthwash. Please continue taking the medication and follow up with your PCP outpatient.

## 2024-06-07 NOTE — DISCHARGE NOTE PROVIDER - HOSPITAL COURSE
Pt is a 54 y/o F PMHx  of HTN, HLD, COPD on home O2, Triple +ve Antiphospholipid Syndrome,  Rheumatoid Arthritis, Cervical cancer s/p hysterectomy, Laryngeal cancer s/p CT, RT, HFpEF (06/21/19 EF 63%) s/p AICD+PPM, DVT/PE (on lovenox QD),  NSTEMI in 2015, Subclavian stenosis s/p stent placement presenting for shortness of breath and pleuritic chest pain. Pt reports on Wed she woke up with pleuritic chest pain and increased SOB. Pt with recent admission 1 week ago for Pneumonia, was DC from hospital on Sunday with oral Abx regimen. States she did not feel well at home, continued to have SOB, intermittent fevers, and increasing need for O2 so she came back to the hospital.    Vitals: /77  RR 20 T99F --> 100.9F Sat 96% on 2L NC  CT Angio Chest PE Protocol w/ IV Cont significant for increased size large pericardial effusion  Labs: WBC 15.7 proBNP 208 Trop 10 Lactate 0.8  Admitted to CCU    Hospital Course  Large pericardial effusion showing early tamponade physiology present based on bedside ECHO, Underwent successful pericardiocentesis on 5/29/24, s/p pericardial drain with 950 cc bloody output on 5/29, drain output 40cc since insertion, drain removed 5/30, Limited ECHO 5/30 with normal function and small pericardial effusion, Repeat ECHO 6/5: EF 58 %, No evidence of pericardial effusion, Cardio recommending high dose ASA, 650 mg TID for at least the next week for pericardial pain, patient has been off aspirin for the last 2 years due to a episode of UGIB, will hold off on aspirin, Continue colchicine 0.6 mg BID for at least 3 months    Left pleural effusion moderate s/p thoracentesis, s/p thoracentesis 650 cc cloudy fluid drained, exudative, s/p course of solumedrol 60 -> c/w prednisone 40 mg taper, c/w spiriva and symbicort and Nebs q6    H/O Anti-phospholipid syndrome with Extensive history of DVTs, recent in stent (subclavian vein) thrombosis, Subcutaneous hematomas and bruising due to supratherapeutic INR  , Cerebral hemorrhage from Coumadin, On Lovenox 110 mg sc daily at home as decided by primary hem-onc team outpatient    Chronic morphine for pain, Pain control per home meds, c/w morphine IR 15 mg q12, c/w morphine ER 30 mg q6    Fever with Pericarditis and Pleural effusion , r/o infectious etiology, infectious workup so far has been negative, Blood culture, procalcitonin, MRSA, Legionella, Strep, Parvovirus, Coxsackie, Adenovirus : Negative, completed empiric 7 day course of antibiotics , zosyn.     Oral candidiasis, started on nystatin swish and swallow q6 and magic mouthwash q4.     Patient is vitally stable, saturating well on her home O2 2lpm, endorses mild chest pain, pleuritic which is improving and is ready to be discharged.

## 2024-06-07 NOTE — DISCHARGE NOTE NURSING/CASE MANAGEMENT/SOCIAL WORK - PATIENT PORTAL LINK FT
You can access the FollowMyHealth Patient Portal offered by [facility] by registering at the following website: [email or web address]. By joining [facility]’s FollowMyHealth portal, you will also be able to view your health information using other applications (apps) compatible with our system.

## 2024-06-07 NOTE — PROGRESS NOTE ADULT - REASON FOR ADMISSION
Large pericardial effusion

## 2024-06-07 NOTE — DISCHARGE NOTE PROVIDER - CARE PROVIDER_API CALL
[facility] Medicine  [address] - [address]  [place] [address]  Phone: [phone]  Fax: [phone]  Follow Up Time: 2 weeks    Musa Magana  Cardiology  [address] [place] [address], [address]  [place] [address]  Phone: [phone]  Fax: [phone]  Follow Up Time: 1 week    Andre Hurst  Pulmonary Disease  [address] [place] [address]  Phone: [phone]  Fax: [phone]  Follow Up Time: 2 weeks

## 2024-06-07 NOTE — DISCHARGE NOTE PROVIDER - PROVIDER TOKENS
PROVIDER:[TOKEN:[[id number]],FOLLOWUP:[2 weeks]],PROVIDER:[TOKEN:[[id number]],FOLLOWUP:[1 week]],PROVIDER:[TOKEN:[[id number]],FOLLOWUP:[2 weeks]]

## 2024-06-07 NOTE — PROGRESS NOTE ADULT - PROVIDER SPECIALTY LIST ADULT
CCU
CCU
Cardiology
Hospitalist
Infectious Disease
Pulmonology
Pulmonology
CCU
CCU
Hospitalist
Internal Medicine
Pulmonology
CCU
Internal Medicine
CCU
Infectious Disease
Internal Medicine
Pulmonology
Pulmonology
CCU

## 2024-06-07 NOTE — DISCHARGE NOTE PROVIDER - NSDCMRMEDTOKEN_GEN_ALL_CORE_FT
albuterol 90 mcg/inh inhalation aerosol: 2 puff(s) inhaled every 6 hours, As Needed  amoxicillin-clavulanate 875 mg-125 mg oral tablet: 1 tab(s) orally 2 times a day  cholecalciferol 125 mcg (5000 intl units) oral tablet: 1 tab(s) orally once a day  colchicine 0.6 mg oral tablet: 1 tab(s) orally 2 times a day  cyanocobalamin 1000 mcg/mL injectable solution: 1 milliliter(s) injectable once a week  doxycycline hyclate 100 mg oral tablet: 1 tab(s) orally 2 times a day  folic acid 1 mg oral tablet: 1 tab(s) orally once a day  Lovenox 100 mg/mL injectable solution: 110 milligram(s) subcutaneously once a day (at bedtime)  Metoprolol Succinate  mg oral tablet, extended release: 1 tab(s) orally once a day  morphine 15 mg oral capsule: 1 tab(s) orally 2 times a day as needed for  severe pain  MS Contin 30 mg oral tablet, extended release: 1 tab(s) orally 7 times a day  naloxone 4 mg/0.1 mL nasal spray: 1 spray(s) intranasally 3 times a day as needed for  opioid overdose  Trelegy Ellipta 100 mcg-62.5 mcg-25 mcg/inh inhalation powder: 1 puff(s) inhaled once a day  Xanax 1 mg oral tablet: 1 tab(s) orally 4 times a day   albuterol 90 mcg/inh inhalation aerosol: 2 puff(s) inhaled every 6 hours, As Needed  cholecalciferol 125 mcg (5000 intl units) oral tablet: 1 tab(s) orally once a day  colchicine 0.6 mg oral tablet: 1 tab(s) orally 2 times a day  cyanocobalamin 1000 mcg/mL injectable solution: 1 milliliter(s) injectable once a week  diphenhydramine/lidocaine/aluminum hydroxide/magnesium hydroxide/simethicone mucous membrane suspension: 10 milliliter(s) mucous membrane every 6 hours  folic acid 1 mg oral tablet: 1 tab(s) orally once a day  Lasix 20 mg oral tablet: 1 tab(s) orally once a day  Lovenox 100 mg/mL injectable solution: 110 milligram(s) subcutaneously once a day (at bedtime)  Metoprolol Succinate  mg oral tablet, extended release: 1 tab(s) orally once a day  morphine 15 mg oral capsule: 1 tab(s) orally 2 times a day as needed for  severe pain  MS Contin 30 mg oral tablet, extended release: 1 tab(s) orally 7 times a day  nystatin 100,000 units/mL oral suspension: 5 milliliter(s) orally 4 times a day  pantoprazole 40 mg oral delayed release tablet: 1 tab(s) orally once a day (before a meal)  predniSONE 20 mg oral tablet: 2 tab(s) orally once a day 2 tabs daily 6/8 - 6/10, then 1 tab daily 6/11-6/15, Half tab daily 6/16-6/20  Trelegy Ellipta 100 mcg-62.5 mcg-25 mcg/inh inhalation powder: 1 puff(s) inhaled once a day  Xanax 1 mg oral tablet: 1 tab(s) orally 4 times a day

## 2024-06-07 NOTE — PROGRESS NOTE ADULT - SUBJECTIVE AND OBJECTIVE BOX
ILEANA MCNEIL  [facility]-N 3A 019 A (SI-N 3A)        Patient was evaluated and examined  by bedside, c/o oral painful lesions and pain during swallowing, still c/o mild pleuritic left sided pain, remains afebrile, no cough, no dyspnea        REVIEW OF SYSTEMS:  please see pertinent positives mentioned above, all other 12 ROS negative      T(C): , Max: 36.7 (06-06-24 @ 13:32)  HR: 70 (06-07-24 @ 05:00)  BP: 135/80 (06-07-24 @ 05:00)  RR: 18 (06-07-24 @ 05:00)  SpO2: 96% (06-07-24 @ 05:00)  CAPILLARY BLOOD GLUCOSE      POCT Blood Glucose.: 145 mg/dL (06 Jun 2024 21:05)  POCT Blood Glucose.: 150 mg/dL (06 Jun 2024 17:00)  POCT Blood Glucose.: 154 mg/dL (06 Jun 2024 11:35)      PHYSICAL EXAM:  General: NAD, AAOX3, patient is sitting comfortably in bed  HEENT: AT, NC, Supple, NO JVD, NO CB, oral small white painful lesions present lower gum, posterior pharynx  Lungs: good breath sounds B/L, no wheezing, no rhonchi  CVS: normal S1, S2, RRR, NO M/G/R  Abdomen: soft, bowel sounds present, non-tender, non-distended  Extremities: trace lower ext edema, no clubbing, no cyanosis, positive peripheral pulses b/l  Neuro: no acute focal neurological deficits  Skin: no rash, no ecchymosis        LAB  CBC  Date: 06-07-24 @ 08:47  Mean cell Bllzferskm21.0  Mean cell Hemoglobin Conc32.7  Mean cell Volum 88.6  Platelet count-Automate 409  RBC Count 4.31  Red Cell Distrib Width14.0  WBC Count12.92  % Albumin, Urine--  Hematocrit 38.2  Hemoglobin 12.5  CBC  Date: 06-06-24 @ 07:09  Mean cell Rwvbezikxk99.9  Mean cell Hemoglobin Conc32.5  Mean cell Volum 89.0  Platelet count-Automate 493  RBC Count 4.18  Red Cell Distrib Width13.9  WBC Count19.59  % Albumin, Urine--  Hematocrit 37.2  Hemoglobin 12.1  CBC  Date: 06-05-24 @ 07:40  Mean cell Xrrapgdkot23.1  Mean cell Hemoglobin Conc32.6  Mean cell Volum 89.4  Platelet count-Automate 452  RBC Count 3.85  Red Cell Distrib Width13.6  WBC Count17.72  % Albumin, Urine--  Hematocrit 34.4  Hemoglobin 11.2  CBC  Date: 06-04-24 @ 07:48  Mean cell Jowunpwlen00.4  Mean cell Hemoglobin Conc32.5  Mean cell Volum 90.4  Platelet count-Automate 403  RBC Count 3.64  Red Cell Distrib Width13.6  WBC Count16.93  % Albumin, Urine--  Hematocrit 32.9  Hemoglobin 10.7  CBC  Date: 06-03-24 @ 04:33  Mean cell Rgibqummjz38.5  Mean cell Hemoglobin Conc31.0  Mean cell Volum 91.8  Platelet count-Automate 329  RBC Count 3.65  Red Cell Distrib Width13.5  WBC Count13.20  % Albumin, Urine--  Hematocrit 33.5  Hemoglobin 10.4  CBC  Date: 06-02-24 @ 04:38  Mean cell Mapenoujqf74.5  Mean cell Hemoglobin Conc31.4  Mean cell Volum 90.8  Platelet count-Automate 260  RBC Count 3.37  Red Cell Distrib Width13.2  WBC Count18.02  % Albumin, Urine--  Hematocrit 30.6  Hemoglobin 9.6  CBC  Date: 06-01-24 @ 04:43  Mean cell Vnpgdcnodt88.0  Mean cell Hemoglobin Conc32.9  Mean cell Volum 88.3  Platelet count-Automate 308  RBC Count 3.34  Red Cell Distrib Width13.1  WBC Count12.26  % Albumin, Urine--  Hematocrit 29.5  Hemoglobin 9.7    [facility]  06-06-24 @ 07:09  Blood Gas Arterial-Calcium,Ionized--  Blood Urea Nitrogen, Serum 27 mg/dL<H> [10 - 20]  Carbon Dioxide, Serum25 mmol/L [17 - 32]  Chloride, Serum99 mmol/L [98 - 110]  Creatinie, Serum1.2 mg/dL [0.7 - 1.5]  Glucose, Mmcko719 mg/dL<H> [70 - 99]  Potassium, Serum4.3 mmol/L [3.5 - 5.0]  Sodium, Serum 137 mmol/L [135 - 146]  [facility]  06-05-24 @ 07:40  Blood Gas Arterial-Calcium,Ionized--  Blood Urea Nitrogen, Serum 27 mg/dL<H> [10 - 20]  Carbon Dioxide, Serum24 mmol/L [17 - 32]  Chloride, Dycuz812 mmol/L [98 - 110]  Creatinie, Serum1.3 mg/dL [0.7 - 1.5]  Glucose, Exmna705 mg/dL<H> [70 - 99]  Potassium, Serum4.4 mmol/L [3.5 - 5.0]  Sodium, Serum 138 mmol/L [135 - 146]  BMP  06-04-24 @ 07:48  Blood Gas Arterial-Calcium,Ionized--  Blood Urea Nitrogen, Serum 22 mg/dL<H> [10 - 20]  Carbon Dioxide, Serum25 mmol/L [17 - 32]  Chloride, Gifdr235 mmol/L [98 - 110]  Creatinie, Serum1.1 mg/dL [0.7 - 1.5]  Glucose, Obabv901 mg/dL<H> [70 - 99]  Potassium, Serum4.8 mmol/L [3.5 - 5.0]  Sodium, Serum 140 mmol/L [135 - 146]  BMP  06-03-24 @ 04:33  Blood Gas Arterial-Calcium,Ionized--  Blood Urea Nitrogen, Serum 24 mg/dL<H> [10 - 20]  Carbon Dioxide, Serum24 mmol/L [17 - 32]  Chloride, Nwpzr007 mmol/L [98 - 110]  Creatinie, Serum1.3 mg/dL [0.7 - 1.5]  Glucose, Glpem705 mg/dL<H> [70 - 99]  Potassium, Serum3.9 mmol/L [3.5 - 5.0]  Sodium, Serum 140 mmol/L [135 - 146]              Microbiology:    Culture - Blood (collected 06-01-24 @ 04:43)  Source: .Blood None  Final Report (06-06-24 @ 17:01):    No growth at 5 days    Culture - Blood (collected 05-31-24 @ 16:41)  Source: .Blood None  Final Report (06-06-24 @ 01:00):    No growth at 5 days    Culture - Blood (collected 05-31-24 @ 11:40)  Source: .Blood None  Final Report (06-05-24 @ 22:00):    No growth at 5 days    Culture - Blood (collected 05-31-24 @ 05:00)  Source: .Blood None  Final Report (06-05-24 @ 14:00):    No growth at 5 days    Culture - Fungal, Body Fluid (collected 05-30-24 @ 13:54)  Source: Pleural Fl Pleural Fluid  Preliminary Report (06-01-24 @ 23:03):    Culture is being performed. Fungal cultures are held for 4 weeks.    Culture - Body Fluid with Gram Stain (collected 05-30-24 @ 13:54)  Source: Pleural Fl Pleural Fluid  Gram Stain (05-31-24 @ 03:18):    polymorphonuclear leukocytes seen    No organisms seen    by cytocentrifuge  Final Report (06-04-24 @ 18:31):    No growth at 5 days    Culture - Blood (collected 05-30-24 @ 02:13)  Source: .Blood Blood-Peripheral  Final Report (06-04-24 @ 10:01):    No growth at 5 days    Culture - Fungal, Body Fluid (collected 05-29-24 @ 17:00)  Source: .Body Fluid Other, Fluid  Preliminary Report (06-05-24 @ 23:03):    No fungus isolated at 1 week.    Culture - Acid Fast - Body Fluid w/Smear (collected 05-29-24 @ 17:00)  Source: Pericardial Other, Pericardial Fluid  Preliminary Report (06-05-24 @ 23:09):    No acid-fast bacilli isolated at 1 week. ****Acid-fast cultures are held    for 6 weeks.****    Culture - Body Fluid with Gram Stain (collected 05-29-24 @ 17:00)  Source: Pericardial Other, Pericardial Fluid  Gram Stain (05-30-24 @ 02:51):    polymorphonuclear leukocytes seen    No organisms seen    by cytocentrifuge  Final Report (06-03-24 @ 16:37):    No growth at 5 days    Culture - Blood (collected 05-28-24 @ 16:11)  Source: .Blood Blood  Gram Stain (05-30-24 @ 19:04):    Growth in anaerobic bottle: Gram Positive Cocci in Clusters  Final Report (05-31-24 @ 15:03):    Growth in anaerobic bottle: Staphylococcus hominis    Isolation of Coagulase negative Staphylococcus from single blood culture    sets may represent    contamination. Contact the Microbiology Department at [phone] if    susceptibility testing is    clinically indicated.    Direct identification is available within approximately 3-5    hours either by Blood Panel Multiplexed PCR or Direct    MALDI-TOF. Details: [email or web address]  Organism: Blood Culture PCR (05-31-24 @ 15:03)  Organism: Blood Culture PCR (05-31-24 @ 15:03)      Method Type: PCR      -  Coagulase negative Staphylococcus: Detec    Culture - Blood (collected 05-28-24 @ 16:11)  Source: .Blood Blood  Final Report (06-02-24 @ 23:00):    No growth at 5 days    Culture - Urine (collected 05-28-24 @ 15:29)  Source: Clean Catch Clean Catch (Midstream)  Final Report (05-30-24 @ 09:29):    No growth    Culture - Stool (collected 05-23-24 @ 17:06)  Source: .Stool Feces  Final Report (05-25-24 @ 17:57):    No enteric pathogens isolated.    (Stool culture examined for Salmonella,    Shigella, Campylobacter, Aeromonas, Plesiomonas,    Vibrio, E.coli O157 and Yersinia)    Culture - Blood (collected 05-19-24 @ 19:59)  Source: .Blood Blood  Final Report (05-25-24 @ 07:00):    No growth at 5 days    Culture - Blood (collected 05-19-24 @ 19:59)  Source: .Blood Blood  Final Report (05-25-24 @ 07:00):    No growth at 5 days      Medications:  acetaminophen     Tablet .. 650 milliGRAM(s) Oral every 6 hours PRN  albuterol    90 MICROgram(s) HFA Inhaler 2 Puff(s) Inhalation every 6 hours PRN  albuterol/ipratropium for Nebulization 3 milliLiter(s) Nebulizer every 8 hours  ALPRAZolam 1 milliGRAM(s) Oral four times a day  aluminum hydroxide/magnesium hydroxide/simethicone Suspension 30 milliLiter(s) Oral every 4 hours PRN  budesonide  80 MICROgram(s)/formoterol 4.5 MICROgram(s) Inhaler 2 Puff(s) Inhalation two times a day  chlorhexidine 2% Cloths 1 Application(s) Topical <User Schedule>  cholecalciferol 2000 Unit(s) Oral daily  colchicine 0.6 milliGRAM(s) Oral two times a day  dextrose 10% Bolus 125 milliLiter(s) IV Bolus once  dextrose 5%. 1000 milliLiter(s) IV Continuous <Continuous>  dextrose 5%. 1000 milliLiter(s) IV Continuous <Continuous>  dextrose 50% Injectable 12.5 Gram(s) IV Push once  dextrose 50% Injectable 25 Gram(s) IV Push once  dextrose Oral Gel 15 Gram(s) Oral once PRN  enoxaparin Injectable 110 milliGRAM(s) SubCutaneous <User Schedule>  famotidine    Tablet 20 milliGRAM(s) Oral daily PRN  FIRST- Mouthwash  BLM 10 milliLiter(s) Swish and Spit every 4 hours  folic acid 1 milliGRAM(s) Oral daily  glucagon  Injectable 1 milliGRAM(s) IntraMuscular once  insulin lispro (ADMELOG) corrective regimen sliding scale   SubCutaneous three times a day before meals  loperamide 2 milliGRAM(s) Oral every 8 hours PRN  melatonin 3 milliGRAM(s) Oral at bedtime PRN  morphine  IR 15 milliGRAM(s) Oral two times a day PRN  morphine ER Tablet 30 milliGRAM(s) Oral four times a day PRN  nystatin    Suspension 264482 Unit(s) Oral four times a day  pantoprazole    Tablet 40 milliGRAM(s) Oral before breakfast  predniSONE   Tablet 40 milliGRAM(s) Oral daily  sodium chloride 3%  Inhalation 4 milliLiter(s) Inhalation every 12 hours  tiotropium 2.5 MICROgram(s) Inhaler 2 Puff(s) Inhalation daily        Assessment and Plan:  Patient is a 54 y/o Female with PMHx  of HTN, HLD, COPD on home O2, SLE, lupus anticoagulant, antiphospholipid syndrome,  rheumatoid arthritis, cervical cancer sp hysterectomy,  laryngeal cancer s/p CT, RT, HFpEF (06/21/19 EF 63%) s/p AICD+PPM, DVT/PE (on lovenox QD),  NSTEMI in 2015, subclavian stenosis s/p stent placement presenting for shortness of breath and pleuritic chest pain. Pt reports on Wed she woke up with pleuritic chest pain and increased SOB. Pt with recent admission 1 week ago for Pneumonia, was DC from hospital on Sunday with oral Abx regimen. States she did not feel well at home, continued to have SOB, intermittent fevers, and increasing need for O2 so she came back to the hospital.    # Large pericardial effusion, early tamponade physiology present based on bedside ECHO  # HFpEF  # Hx of Torsades  # NSTEMI in 2015  - Underwent successful pericardiocentesis on 5/29/24, s/p pericardial drain with 950 cc bloody output on 5/29. drain output 40cc since insertion, drain removed 5/30.   - Continue colchicine 0.6 mg BID for at least 3 months  - patient refusing to take   mg TID as  was advised by Cardio, reporting that she will develop gastric ulcer. this was not recommended by [facility]. team.  -6/5/24- patient c/o chest and back pain, dyspnea at rest, repeated limited echo was repeated showed no pericardial effusion, repeated CXR- no significant increase in left lung effusion.   -patient was instructed to be compliant with medical tx. and f/up with outpatient Cardiology specialist for further management and tx.     # Left pleural effusion moderate s/p thoracentesis  # Left lung atelectasis, improved  # Hx COPD on home O2  - s/p thoracentesis 650 cc cloudy fluid drained  - Currently saturating 94% on RA  - 6/5/24 transitioned to PO Prednisone 40 mg once daily x 5 more days  - c/w spiriva and symbicort,  Nebs q6  - resume patient on Lasix 20 mg po once daily   - outpatient Pulmonary specialist f/up     # Leukocytosis- most likely due to steroids tx, pt. clinically remains afebrile, repeated WBC trending down    # Oral thrush - started on Magic Mouthwash, Nystatin tx. x 2 weeks.       # Anti-phospholipid syndrome  # Extensive history of DVTs, recent in stent (subclavian vein) thrombosis  # Cerebral hemorrhage from Coumadin  - On Lovenox 110 mg sc daily at home- resumed on Lovenox tx. from 6/5/24  -close outpatient CBC monitoring, outpatient  Neuro, Rheumatology f/up     # Chronic morphine for pain - upon d/c resumed on home regimen tx.      # Anxiety : on chronic alprazolam tx. at home     # Fever with Pericarditis and Pleural effusion , no  infectious etiology  - Blood culture, procalcitonin, MRSA, Legionella, Strep, Parvovirus neg , Coxsackie ab's positive   - completed   empiric 7 days of antibiotics , on zosyn ended  6/6      DVT prophylaxis: therapeutic lovenox tx.    - Feeds: DASH/TLC  - Prophylaxis: Pantoprazole  Code status: Full .    #Progress Note Handoff: patient was medically optimized, stable for d/c home today   Family discussion: Patient verbalized good understanding of discharge instructions and agreed with discharge plan.  Disposition: Home__ today     Total time spent to complete patient's bedside assessment, review medical chart, discuss discharge medical plan of care with covering medical team was more than 35 minutes with >50% of time spent face to face with patient, discussion with patient/family and/or coordination of care .

## 2024-06-07 NOTE — DISCHARGE NOTE NURSING/CASE MANAGEMENT/SOCIAL WORK - NSDCPEFALRISK_GEN_ALL_CORE
For information on Fall & Injury Prevention, visit: [email or web address] OR  [email or web address] OR  https://www.cdc.gov/steadi/patient.html
Never smoker

## 2024-06-08 LAB
H CAPSUL AG SER IA-MCNC: SIGNIFICANT CHANGE UP
VIRUS SPEC CULT: SIGNIFICANT CHANGE UP

## 2024-06-11 ENCOUNTER — INPATIENT (INPATIENT)
Facility: HOSPITAL | Age: 54
LOS: 6 days | Discharge: HOME | DRG: 546 | End: 2024-06-18
Attending: INTERNAL MEDICINE | Admitting: INTERNAL MEDICINE
Payer: MEDICARE

## 2024-06-11 VITALS
HEART RATE: 127 BPM | TEMPERATURE: 98 F | OXYGEN SATURATION: 97 % | SYSTOLIC BLOOD PRESSURE: 133 MMHG | WEIGHT: 175.05 LBS | HEIGHT: 63 IN | RESPIRATION RATE: 22 BRPM | DIASTOLIC BLOOD PRESSURE: 90 MMHG

## 2024-06-11 DIAGNOSIS — R07.9 CHEST PAIN, UNSPECIFIED: ICD-10-CM

## 2024-06-11 DIAGNOSIS — Z90.710 ACQUIRED ABSENCE OF BOTH CERVIX AND UTERUS: Chronic | ICD-10-CM

## 2024-06-11 DIAGNOSIS — Z98.890 OTHER SPECIFIED POSTPROCEDURAL STATES: Chronic | ICD-10-CM

## 2024-06-11 DIAGNOSIS — Z90.49 ACQUIRED ABSENCE OF OTHER SPECIFIED PARTS OF DIGESTIVE TRACT: Chronic | ICD-10-CM

## 2024-06-11 DIAGNOSIS — I77.1 STRICTURE OF ARTERY: Chronic | ICD-10-CM

## 2024-06-11 DIAGNOSIS — Z95.810 PRESENCE OF AUTOMATIC (IMPLANTABLE) CARDIAC DEFIBRILLATOR: Chronic | ICD-10-CM

## 2024-06-11 LAB
ALBUMIN SERPL ELPH-MCNC: 4.2 G/DL — SIGNIFICANT CHANGE UP (ref 3.5–5.2)
ALP SERPL-CCNC: 107 U/L — SIGNIFICANT CHANGE UP (ref 30–115)
ALT FLD-CCNC: 44 U/L — HIGH (ref 0–41)
ANION GAP SERPL CALC-SCNC: 12 MMOL/L — SIGNIFICANT CHANGE UP (ref 7–14)
APPEARANCE UR: CLEAR — SIGNIFICANT CHANGE UP
AST SERPL-CCNC: 33 U/L — SIGNIFICANT CHANGE UP (ref 0–41)
BASE EXCESS BLDV CALC-SCNC: 1.9 MMOL/L — SIGNIFICANT CHANGE UP (ref -2–3)
BASOPHILS # BLD AUTO: 0 K/UL — SIGNIFICANT CHANGE UP (ref 0–0.2)
BASOPHILS NFR BLD AUTO: 0 % — SIGNIFICANT CHANGE UP (ref 0–1)
BILIRUB SERPL-MCNC: 0.5 MG/DL — SIGNIFICANT CHANGE UP (ref 0.2–1.2)
BILIRUB UR-MCNC: NEGATIVE — SIGNIFICANT CHANGE UP
BUN SERPL-MCNC: 20 MG/DL — SIGNIFICANT CHANGE UP (ref 10–20)
CA-I SERPL-SCNC: 1.14 MMOL/L — LOW (ref 1.15–1.33)
CALCIUM SERPL-MCNC: 9.6 MG/DL — SIGNIFICANT CHANGE UP (ref 8.4–10.5)
CHLORIDE SERPL-SCNC: 93 MMOL/L — LOW (ref 98–110)
CO2 SERPL-SCNC: 27 MMOL/L — SIGNIFICANT CHANGE UP (ref 17–32)
COLOR SPEC: YELLOW — SIGNIFICANT CHANGE UP
CREAT SERPL-MCNC: 1.2 MG/DL — SIGNIFICANT CHANGE UP (ref 0.7–1.5)
DACRYOCYTES BLD QL SMEAR: SLIGHT — SIGNIFICANT CHANGE UP
DIFF PNL FLD: NEGATIVE — SIGNIFICANT CHANGE UP
EGFR: 54 ML/MIN/1.73M2 — LOW
EOSINOPHIL # BLD AUTO: 0.19 K/UL — SIGNIFICANT CHANGE UP (ref 0–0.7)
EOSINOPHIL NFR BLD AUTO: 0.9 % — SIGNIFICANT CHANGE UP (ref 0–8)
GAS PNL BLDV: 128 MMOL/L — LOW (ref 136–145)
GAS PNL BLDV: SIGNIFICANT CHANGE UP
GLUCOSE SERPL-MCNC: 126 MG/DL — HIGH (ref 70–99)
GLUCOSE UR QL: NEGATIVE MG/DL — SIGNIFICANT CHANGE UP
HCO3 BLDV-SCNC: 27 MMOL/L — SIGNIFICANT CHANGE UP (ref 22–29)
HCT VFR BLD CALC: 43.5 % — SIGNIFICANT CHANGE UP (ref 37–47)
HCT VFR BLDA CALC: 42 % — SIGNIFICANT CHANGE UP (ref 34.5–46.5)
HGB BLD CALC-MCNC: 14.1 G/DL — SIGNIFICANT CHANGE UP (ref 11.7–16.1)
HGB BLD-MCNC: 14.2 G/DL — SIGNIFICANT CHANGE UP (ref 12–16)
KETONES UR-MCNC: NEGATIVE MG/DL — SIGNIFICANT CHANGE UP
LACTATE BLDV-MCNC: 2 MMOL/L — SIGNIFICANT CHANGE UP (ref 0.5–2)
LACTATE SERPL-SCNC: 1.3 MMOL/L — SIGNIFICANT CHANGE UP (ref 0.7–2)
LEUKOCYTE ESTERASE UR-ACNC: NEGATIVE — SIGNIFICANT CHANGE UP
LYMPHOCYTES # BLD AUTO: 1.12 K/UL — LOW (ref 1.2–3.4)
LYMPHOCYTES # BLD AUTO: 5.3 % — LOW (ref 20.5–51.1)
MCHC RBC-ENTMCNC: 28.9 PG — SIGNIFICANT CHANGE UP (ref 27–31)
MCHC RBC-ENTMCNC: 32.6 G/DL — SIGNIFICANT CHANGE UP (ref 32–37)
MCV RBC AUTO: 88.6 FL — SIGNIFICANT CHANGE UP (ref 81–99)
MONOCYTES # BLD AUTO: 1.68 K/UL — HIGH (ref 0.1–0.6)
MONOCYTES NFR BLD AUTO: 7.9 % — SIGNIFICANT CHANGE UP (ref 1.7–9.3)
MYELOCYTES NFR BLD: 4.4 % — HIGH (ref 0–0)
NEUTROPHILS # BLD AUTO: 16.93 K/UL — HIGH (ref 1.4–6.5)
NEUTROPHILS NFR BLD AUTO: 79.8 % — HIGH (ref 42.2–75.2)
NITRITE UR-MCNC: NEGATIVE — SIGNIFICANT CHANGE UP
OVALOCYTES BLD QL SMEAR: SLIGHT — SIGNIFICANT CHANGE UP
PCO2 BLDV: 41 MMHG — SIGNIFICANT CHANGE UP (ref 39–42)
PH BLDV: 7.42 — SIGNIFICANT CHANGE UP (ref 7.32–7.43)
PH UR: 7 — SIGNIFICANT CHANGE UP (ref 5–8)
PLAT MORPH BLD: NORMAL — SIGNIFICANT CHANGE UP
PLATELET # BLD AUTO: 277 K/UL — SIGNIFICANT CHANGE UP (ref 130–400)
PMV BLD: 10.7 FL — HIGH (ref 7.4–10.4)
PO2 BLDV: 48 MMHG — HIGH (ref 25–45)
POTASSIUM BLDV-SCNC: 4.7 MMOL/L — SIGNIFICANT CHANGE UP (ref 3.5–5.1)
POTASSIUM SERPL-MCNC: 4.6 MMOL/L — SIGNIFICANT CHANGE UP (ref 3.5–5)
POTASSIUM SERPL-SCNC: 4.6 MMOL/L — SIGNIFICANT CHANGE UP (ref 3.5–5)
PROT SERPL-MCNC: 6.6 G/DL — SIGNIFICANT CHANGE UP (ref 6–8)
PROT UR-MCNC: NEGATIVE MG/DL — SIGNIFICANT CHANGE UP
RBC # BLD: 4.91 M/UL — SIGNIFICANT CHANGE UP (ref 4.2–5.4)
RBC # FLD: 14.2 % — SIGNIFICANT CHANGE UP (ref 11.5–14.5)
RBC BLD AUTO: SIGNIFICANT CHANGE UP
SAO2 % BLDV: 70 % — SIGNIFICANT CHANGE UP (ref 67–88)
SCHISTOCYTES BLD QL AUTO: SLIGHT — SIGNIFICANT CHANGE UP
SODIUM SERPL-SCNC: 132 MMOL/L — LOW (ref 135–146)
SP GR SPEC: <1.005 — LOW (ref 1–1.03)
TROPONIN T, HIGH SENSITIVITY RESULT: 12 NG/L — SIGNIFICANT CHANGE UP (ref 6–13)
TROPONIN T, HIGH SENSITIVITY RESULT: 13 NG/L — SIGNIFICANT CHANGE UP (ref 6–13)
UROBILINOGEN FLD QL: 0.2 MG/DL — SIGNIFICANT CHANGE UP (ref 0.2–1)
VARIANT LYMPHS # BLD: 1.7 % — SIGNIFICANT CHANGE UP (ref 0–5)
WBC # BLD: 21.22 K/UL — HIGH (ref 4.8–10.8)
WBC # FLD AUTO: 21.22 K/UL — HIGH (ref 4.8–10.8)

## 2024-06-11 PROCEDURE — 99285 EMERGENCY DEPT VISIT HI MDM: CPT | Mod: 25,GC

## 2024-06-11 PROCEDURE — 71045 X-RAY EXAM CHEST 1 VIEW: CPT | Mod: 26

## 2024-06-11 PROCEDURE — 87640 STAPH A DNA AMP PROBE: CPT

## 2024-06-11 PROCEDURE — 85025 COMPLETE CBC W/AUTO DIFF WBC: CPT

## 2024-06-11 PROCEDURE — 94640 AIRWAY INHALATION TREATMENT: CPT

## 2024-06-11 PROCEDURE — 97161 PT EVAL LOW COMPLEX 20 MIN: CPT | Mod: GP

## 2024-06-11 PROCEDURE — 36415 COLL VENOUS BLD VENIPUNCTURE: CPT

## 2024-06-11 PROCEDURE — 36000 PLACE NEEDLE IN VEIN: CPT | Mod: 59

## 2024-06-11 PROCEDURE — 84100 ASSAY OF PHOSPHORUS: CPT

## 2024-06-11 PROCEDURE — 80202 ASSAY OF VANCOMYCIN: CPT

## 2024-06-11 PROCEDURE — 87899 AGENT NOS ASSAY W/OPTIC: CPT

## 2024-06-11 PROCEDURE — 86225 DNA ANTIBODY NATIVE: CPT

## 2024-06-11 PROCEDURE — 87641 MR-STAPH DNA AMP PROBE: CPT

## 2024-06-11 PROCEDURE — 84156 ASSAY OF PROTEIN URINE: CPT

## 2024-06-11 PROCEDURE — 87449 NOS EACH ORGANISM AG IA: CPT

## 2024-06-11 PROCEDURE — 86900 BLOOD TYPING SEROLOGIC ABO: CPT

## 2024-06-11 PROCEDURE — 86850 RBC ANTIBODY SCREEN: CPT

## 2024-06-11 PROCEDURE — 86780 TREPONEMA PALLIDUM: CPT

## 2024-06-11 PROCEDURE — 86140 C-REACTIVE PROTEIN: CPT

## 2024-06-11 PROCEDURE — 82570 ASSAY OF URINE CREATININE: CPT

## 2024-06-11 PROCEDURE — 84145 PROCALCITONIN (PCT): CPT

## 2024-06-11 PROCEDURE — 83880 ASSAY OF NATRIURETIC PEPTIDE: CPT

## 2024-06-11 PROCEDURE — 71275 CT ANGIOGRAPHY CHEST: CPT | Mod: 26,MC

## 2024-06-11 PROCEDURE — 93307 TTE W/O DOPPLER COMPLETE: CPT

## 2024-06-11 PROCEDURE — 74177 CT ABD & PELVIS W/CONTRAST: CPT | Mod: 26,MC

## 2024-06-11 PROCEDURE — 86160 COMPLEMENT ANTIGEN: CPT

## 2024-06-11 PROCEDURE — 80048 BASIC METABOLIC PNL TOTAL CA: CPT

## 2024-06-11 PROCEDURE — 80053 COMPREHEN METABOLIC PANEL: CPT

## 2024-06-11 PROCEDURE — 85652 RBC SED RATE AUTOMATED: CPT

## 2024-06-11 PROCEDURE — 99223 1ST HOSP IP/OBS HIGH 75: CPT

## 2024-06-11 PROCEDURE — 86901 BLOOD TYPING SEROLOGIC RH(D): CPT

## 2024-06-11 PROCEDURE — 83735 ASSAY OF MAGNESIUM: CPT

## 2024-06-11 PROCEDURE — 93308 TTE F-UP OR LMTD: CPT | Mod: 26

## 2024-06-11 RX ORDER — FUROSEMIDE 10 MG/ML
20 INJECTION, SOLUTION INTRAMUSCULAR; INTRAVENOUS DAILY
Refills: 0 | Status: DISCONTINUED | OUTPATIENT
Start: 2024-06-11 | End: 2024-06-18

## 2024-06-11 RX ORDER — ALPRAZOLAM 2 MG/1
1 TABLET ORAL
Refills: 0 | Status: DISCONTINUED | OUTPATIENT
Start: 2024-06-11 | End: 2024-06-18

## 2024-06-11 RX ORDER — PANTOPRAZOLE SODIUM 40 MG/10ML
40 INJECTION, POWDER, FOR SOLUTION INTRAVENOUS
Refills: 0 | Status: DISCONTINUED | OUTPATIENT
Start: 2024-06-11 | End: 2024-06-18

## 2024-06-11 RX ORDER — VANCOMYCIN HYDROCHLORIDE 50 MG/ML
1000 KIT ORAL ONCE
Refills: 0 | Status: COMPLETED | OUTPATIENT
Start: 2024-06-11 | End: 2024-06-11

## 2024-06-11 RX ORDER — TIOTROPIUM BROMIDE 18 UG/1
2 CAPSULE ORAL; RESPIRATORY (INHALATION) DAILY
Refills: 0 | Status: DISCONTINUED | OUTPATIENT
Start: 2024-06-11 | End: 2024-06-18

## 2024-06-11 RX ORDER — DEXTROSE MONOHYDRATE AND SODIUM CHLORIDE 5; .3 G/100ML; G/100ML
1000 INJECTION, SOLUTION INTRAVENOUS ONCE
Refills: 0 | Status: COMPLETED | OUTPATIENT
Start: 2024-06-11 | End: 2024-06-11

## 2024-06-11 RX ORDER — DIPHENHYDRAMINE HYDROCHLORIDE AND LIDOCAINE HYDROCHLORIDE AND ALUMINUM HYDROXIDE AND MAGNESIUM HYDRO
10 KIT EVERY 6 HOURS
Refills: 0 | Status: DISCONTINUED | OUTPATIENT
Start: 2024-06-11 | End: 2024-06-18

## 2024-06-11 RX ORDER — AZTREONAM 2 G
2000 VIAL (EA) INJECTION EVERY 8 HOURS
Refills: 0 | Status: DISCONTINUED | OUTPATIENT
Start: 2024-06-11 | End: 2024-06-12

## 2024-06-11 RX ORDER — ACETAMINOPHEN 325 MG
650 TABLET ORAL EVERY 6 HOURS
Refills: 0 | Status: DISCONTINUED | OUTPATIENT
Start: 2024-06-11 | End: 2024-06-18

## 2024-06-11 RX ORDER — VANCOMYCIN HYDROCHLORIDE 50 MG/ML
1250 KIT ORAL EVERY 12 HOURS
Refills: 0 | Status: DISCONTINUED | OUTPATIENT
Start: 2024-06-11 | End: 2024-06-13

## 2024-06-11 RX ORDER — AZTREONAM 2 G
2000 VIAL (EA) INJECTION ONCE
Refills: 0 | Status: COMPLETED | OUTPATIENT
Start: 2024-06-11 | End: 2024-06-11

## 2024-06-11 RX ORDER — PREDNISONE 10 MG/1
40 TABLET ORAL DAILY
Refills: 0 | Status: DISCONTINUED | OUTPATIENT
Start: 2024-06-11 | End: 2024-06-18

## 2024-06-11 RX ORDER — MORPHINE SULFATE 100 MG/1
15 TABLET, EXTENDED RELEASE ORAL EVERY 6 HOURS
Refills: 0 | Status: DISCONTINUED | OUTPATIENT
Start: 2024-06-11 | End: 2024-06-15

## 2024-06-11 RX ORDER — IPRATROPIUM BROMIDE AND ALBUTEROL SULFATE .5; 3 MG/3ML; MG/3ML
3 SOLUTION RESPIRATORY (INHALATION) EVERY 6 HOURS
Refills: 0 | Status: DISCONTINUED | OUTPATIENT
Start: 2024-06-11 | End: 2024-06-13

## 2024-06-11 RX ORDER — PIPERACILLIN SODIUM AND TAZOBACTAM SODIUM 3; .375 G/15ML; G/15ML
3.38 INJECTION, POWDER, LYOPHILIZED, FOR SOLUTION INTRAVENOUS EVERY 8 HOURS
Refills: 0 | Status: DISCONTINUED | OUTPATIENT
Start: 2024-06-11 | End: 2024-06-13

## 2024-06-11 RX ORDER — FOLIC ACID
1 POWDER (GRAM) MISCELLANEOUS DAILY
Refills: 0 | Status: DISCONTINUED | OUTPATIENT
Start: 2024-06-11 | End: 2024-06-18

## 2024-06-11 RX ORDER — METOPROLOL TARTRATE 50 MG
100 TABLET ORAL DAILY
Refills: 0 | Status: DISCONTINUED | OUTPATIENT
Start: 2024-06-12 | End: 2024-06-18

## 2024-06-11 RX ORDER — ENOXAPARIN SODIUM 100 MG/ML
110 INJECTION SUBCUTANEOUS EVERY 24 HOURS
Refills: 0 | Status: DISCONTINUED | OUTPATIENT
Start: 2024-06-11 | End: 2024-06-18

## 2024-06-11 RX ORDER — BUDESONIDE/FORMOTEROL FUMARATE 160-4.5MCG
2 HFA AEROSOL WITH ADAPTER (GRAM) INHALATION
Refills: 0 | Status: DISCONTINUED | OUTPATIENT
Start: 2024-06-11 | End: 2024-06-18

## 2024-06-11 RX ORDER — NYSTATIN 100000 [USP'U]/ML
500000 SUSPENSION ORAL
Refills: 0 | Status: DISCONTINUED | OUTPATIENT
Start: 2024-06-11 | End: 2024-06-18

## 2024-06-11 RX ORDER — AZTREONAM 2 G
VIAL (EA) INJECTION
Refills: 0 | Status: DISCONTINUED | OUTPATIENT
Start: 2024-06-11 | End: 2024-06-12

## 2024-06-11 RX ORDER — COLCHICINE 0.6 MG/1
0.6 TABLET ORAL
Refills: 0 | Status: DISCONTINUED | OUTPATIENT
Start: 2024-06-11 | End: 2024-06-18

## 2024-06-11 RX ORDER — ALBUTEROL 90 MCG
2 AEROSOL REFILL (GRAM) INHALATION EVERY 6 HOURS
Refills: 0 | Status: DISCONTINUED | OUTPATIENT
Start: 2024-06-11 | End: 2024-06-15

## 2024-06-11 RX ORDER — MORPHINE SULFATE 100 MG/1
30 TABLET, EXTENDED RELEASE ORAL
Refills: 0 | Status: DISCONTINUED | OUTPATIENT
Start: 2024-06-11 | End: 2024-06-15

## 2024-06-11 RX ADMIN — NYSTATIN 500000 UNIT(S): 100000 SUSPENSION ORAL at 23:47

## 2024-06-11 RX ADMIN — MORPHINE SULFATE 15 MILLIGRAM(S): 100 TABLET, EXTENDED RELEASE ORAL at 22:29

## 2024-06-11 RX ADMIN — Medication 100 MILLIGRAM(S): at 22:30

## 2024-06-11 RX ADMIN — Medication 2000 MILLIGRAM(S): at 16:40

## 2024-06-11 RX ADMIN — MORPHINE SULFATE 30 MILLIGRAM(S): 100 TABLET, EXTENDED RELEASE ORAL at 23:28

## 2024-06-11 RX ADMIN — DIPHENHYDRAMINE HYDROCHLORIDE AND LIDOCAINE HYDROCHLORIDE AND ALUMINUM HYDROXIDE AND MAGNESIUM HYDRO 10 MILLILITER(S): KIT at 23:47

## 2024-06-11 RX ADMIN — VANCOMYCIN HYDROCHLORIDE 250 MILLIGRAM(S): KIT at 16:50

## 2024-06-11 RX ADMIN — PIPERACILLIN SODIUM AND TAZOBACTAM SODIUM 25 GRAM(S): 3; .375 INJECTION, POWDER, LYOPHILIZED, FOR SOLUTION INTRAVENOUS at 23:28

## 2024-06-11 RX ADMIN — VANCOMYCIN HYDROCHLORIDE 1000 MILLIGRAM(S): KIT at 18:00

## 2024-06-11 RX ADMIN — ENOXAPARIN SODIUM 110 MILLIGRAM(S): 100 INJECTION SUBCUTANEOUS at 22:29

## 2024-06-11 RX ADMIN — DEXTROSE MONOHYDRATE AND SODIUM CHLORIDE 1000 MILLILITER(S): 5; .3 INJECTION, SOLUTION INTRAVENOUS at 16:40

## 2024-06-11 RX ADMIN — Medication 100 MILLIGRAM(S): at 15:39

## 2024-06-11 RX ADMIN — DEXTROSE MONOHYDRATE AND SODIUM CHLORIDE 1000 MILLILITER(S): 5; .3 INJECTION, SOLUTION INTRAVENOUS at 15:25

## 2024-06-12 LAB
ALBUMIN SERPL ELPH-MCNC: 3.8 G/DL — SIGNIFICANT CHANGE UP (ref 3.5–5.2)
ALP SERPL-CCNC: 83 U/L — SIGNIFICANT CHANGE UP (ref 30–115)
ALT FLD-CCNC: 37 U/L — SIGNIFICANT CHANGE UP (ref 0–41)
ANION GAP SERPL CALC-SCNC: 14 MMOL/L — SIGNIFICANT CHANGE UP (ref 7–14)
AST SERPL-CCNC: 25 U/L — SIGNIFICANT CHANGE UP (ref 0–41)
BASOPHILS # BLD AUTO: 0.06 K/UL — SIGNIFICANT CHANGE UP (ref 0–0.2)
BASOPHILS NFR BLD AUTO: 0.4 % — SIGNIFICANT CHANGE UP (ref 0–1)
BILIRUB SERPL-MCNC: 0.4 MG/DL — SIGNIFICANT CHANGE UP (ref 0.2–1.2)
BUN SERPL-MCNC: 17 MG/DL — SIGNIFICANT CHANGE UP (ref 10–20)
CALCIUM SERPL-MCNC: 9.1 MG/DL — SIGNIFICANT CHANGE UP (ref 8.4–10.5)
CHLORIDE SERPL-SCNC: 97 MMOL/L — LOW (ref 98–110)
CO2 SERPL-SCNC: 25 MMOL/L — SIGNIFICANT CHANGE UP (ref 17–32)
CREAT ?TM UR-MCNC: 45 MG/DL — SIGNIFICANT CHANGE UP
CREAT SERPL-MCNC: 1 MG/DL — SIGNIFICANT CHANGE UP (ref 0.7–1.5)
CRP SERPL-MCNC: 152.3 MG/L — HIGH
EGFR: 67 ML/MIN/1.73M2 — SIGNIFICANT CHANGE UP
EOSINOPHIL # BLD AUTO: 0.1 K/UL — SIGNIFICANT CHANGE UP (ref 0–0.7)
EOSINOPHIL NFR BLD AUTO: 0.6 % — SIGNIFICANT CHANGE UP (ref 0–8)
ERYTHROCYTE [SEDIMENTATION RATE] IN BLOOD: 28 MM/HR — HIGH (ref 0–20)
GLUCOSE SERPL-MCNC: 133 MG/DL — HIGH (ref 70–99)
HCT VFR BLD CALC: 40.1 % — SIGNIFICANT CHANGE UP (ref 37–47)
HGB BLD-MCNC: 12.8 G/DL — SIGNIFICANT CHANGE UP (ref 12–16)
IMM GRANULOCYTES NFR BLD AUTO: 1.9 % — HIGH (ref 0.1–0.3)
LYMPHOCYTES # BLD AUTO: 13.7 % — LOW (ref 20.5–51.1)
LYMPHOCYTES # BLD AUTO: 2.2 K/UL — SIGNIFICANT CHANGE UP (ref 1.2–3.4)
MAGNESIUM SERPL-MCNC: 1.7 MG/DL — LOW (ref 1.8–2.4)
MCHC RBC-ENTMCNC: 28.8 PG — SIGNIFICANT CHANGE UP (ref 27–31)
MCHC RBC-ENTMCNC: 31.9 G/DL — LOW (ref 32–37)
MCV RBC AUTO: 90.1 FL — SIGNIFICANT CHANGE UP (ref 81–99)
MONOCYTES # BLD AUTO: 1.99 K/UL — HIGH (ref 0.1–0.6)
MONOCYTES NFR BLD AUTO: 12.4 % — HIGH (ref 1.7–9.3)
MRSA PCR RESULT.: NEGATIVE — SIGNIFICANT CHANGE UP
NEUTROPHILS # BLD AUTO: 11.41 K/UL — HIGH (ref 1.4–6.5)
NEUTROPHILS NFR BLD AUTO: 71 % — SIGNIFICANT CHANGE UP (ref 42.2–75.2)
NRBC # BLD: 0 /100 WBCS — SIGNIFICANT CHANGE UP (ref 0–0)
NT-PROBNP SERPL-SCNC: 127 PG/ML — SIGNIFICANT CHANGE UP (ref 0–300)
PHOSPHATE SERPL-MCNC: 3.5 MG/DL — SIGNIFICANT CHANGE UP (ref 2.1–4.9)
PLATELET # BLD AUTO: 193 K/UL — SIGNIFICANT CHANGE UP (ref 130–400)
PMV BLD: 10.9 FL — HIGH (ref 7.4–10.4)
POTASSIUM SERPL-MCNC: 4.1 MMOL/L — SIGNIFICANT CHANGE UP (ref 3.5–5)
POTASSIUM SERPL-SCNC: 4.1 MMOL/L — SIGNIFICANT CHANGE UP (ref 3.5–5)
PROT ?TM UR-MCNC: 6 MG/DLG/24H — SIGNIFICANT CHANGE UP
PROT SERPL-MCNC: 6.1 G/DL — SIGNIFICANT CHANGE UP (ref 6–8)
PROT/CREAT UR-RTO: 0.1 RATIO — SIGNIFICANT CHANGE UP (ref 0–0.2)
RBC # BLD: 4.45 M/UL — SIGNIFICANT CHANGE UP (ref 4.2–5.4)
RBC # FLD: 14.4 % — SIGNIFICANT CHANGE UP (ref 11.5–14.5)
SODIUM SERPL-SCNC: 136 MMOL/L — SIGNIFICANT CHANGE UP (ref 135–146)
VANCOMYCIN FLD-MCNC: 27.3 UG/ML — HIGH (ref 5–10)
WBC # BLD: 16.07 K/UL — HIGH (ref 4.8–10.8)
WBC # FLD AUTO: 16.07 K/UL — HIGH (ref 4.8–10.8)

## 2024-06-12 PROCEDURE — 99233 SBSQ HOSP IP/OBS HIGH 50: CPT

## 2024-06-12 PROCEDURE — 99232 SBSQ HOSP IP/OBS MODERATE 35: CPT

## 2024-06-12 RX ORDER — MAGNESIUM SULFATE 100 %
1 POWDER (GRAM) MISCELLANEOUS ONCE
Refills: 0 | Status: COMPLETED | OUTPATIENT
Start: 2024-06-12 | End: 2024-06-12

## 2024-06-12 RX ADMIN — Medication 100 GRAM(S): at 11:39

## 2024-06-12 RX ADMIN — ENOXAPARIN SODIUM 110 MILLIGRAM(S): 100 INJECTION SUBCUTANEOUS at 21:15

## 2024-06-12 RX ADMIN — DIPHENHYDRAMINE HYDROCHLORIDE AND LIDOCAINE HYDROCHLORIDE AND ALUMINUM HYDROXIDE AND MAGNESIUM HYDRO 10 MILLILITER(S): KIT at 17:26

## 2024-06-12 RX ADMIN — PREDNISONE 40 MILLIGRAM(S): 10 TABLET ORAL at 05:57

## 2024-06-12 RX ADMIN — Medication 5000 UNIT(S): at 11:46

## 2024-06-12 RX ADMIN — DIPHENHYDRAMINE HYDROCHLORIDE AND LIDOCAINE HYDROCHLORIDE AND ALUMINUM HYDROXIDE AND MAGNESIUM HYDRO 10 MILLILITER(S): KIT at 05:36

## 2024-06-12 RX ADMIN — IPRATROPIUM BROMIDE AND ALBUTEROL SULFATE 3 MILLILITER(S): .5; 3 SOLUTION RESPIRATORY (INHALATION) at 19:54

## 2024-06-12 RX ADMIN — MORPHINE SULFATE 30 MILLIGRAM(S): 100 TABLET, EXTENDED RELEASE ORAL at 08:50

## 2024-06-12 RX ADMIN — NYSTATIN 500000 UNIT(S): 100000 SUSPENSION ORAL at 05:57

## 2024-06-12 RX ADMIN — FUROSEMIDE 20 MILLIGRAM(S): 10 INJECTION, SOLUTION INTRAMUSCULAR; INTRAVENOUS at 05:37

## 2024-06-12 RX ADMIN — NYSTATIN 500000 UNIT(S): 100000 SUSPENSION ORAL at 17:26

## 2024-06-12 RX ADMIN — VANCOMYCIN HYDROCHLORIDE 166.67 MILLIGRAM(S): KIT at 05:36

## 2024-06-12 RX ADMIN — MORPHINE SULFATE 30 MILLIGRAM(S): 100 TABLET, EXTENDED RELEASE ORAL at 09:30

## 2024-06-12 RX ADMIN — MORPHINE SULFATE 30 MILLIGRAM(S): 100 TABLET, EXTENDED RELEASE ORAL at 17:57

## 2024-06-12 RX ADMIN — PIPERACILLIN SODIUM AND TAZOBACTAM SODIUM 25 GRAM(S): 3; .375 INJECTION, POWDER, LYOPHILIZED, FOR SOLUTION INTRAVENOUS at 21:17

## 2024-06-12 RX ADMIN — Medication 2 PUFF(S): at 21:14

## 2024-06-12 RX ADMIN — MORPHINE SULFATE 30 MILLIGRAM(S): 100 TABLET, EXTENDED RELEASE ORAL at 12:47

## 2024-06-12 RX ADMIN — ALPRAZOLAM 1 MILLIGRAM(S): 2 TABLET ORAL at 18:53

## 2024-06-12 RX ADMIN — ALPRAZOLAM 1 MILLIGRAM(S): 2 TABLET ORAL at 00:50

## 2024-06-12 RX ADMIN — Medication 650 MILLIGRAM(S): at 14:41

## 2024-06-12 RX ADMIN — DIPHENHYDRAMINE HYDROCHLORIDE AND LIDOCAINE HYDROCHLORIDE AND ALUMINUM HYDROXIDE AND MAGNESIUM HYDRO 10 MILLILITER(S): KIT at 21:07

## 2024-06-12 RX ADMIN — NYSTATIN 500000 UNIT(S): 100000 SUSPENSION ORAL at 21:07

## 2024-06-12 RX ADMIN — MORPHINE SULFATE 30 MILLIGRAM(S): 100 TABLET, EXTENDED RELEASE ORAL at 17:27

## 2024-06-12 RX ADMIN — MORPHINE SULFATE 30 MILLIGRAM(S): 100 TABLET, EXTENDED RELEASE ORAL at 13:17

## 2024-06-12 RX ADMIN — MORPHINE SULFATE 30 MILLIGRAM(S): 100 TABLET, EXTENDED RELEASE ORAL at 21:37

## 2024-06-12 RX ADMIN — MORPHINE SULFATE 30 MILLIGRAM(S): 100 TABLET, EXTENDED RELEASE ORAL at 21:07

## 2024-06-12 RX ADMIN — PIPERACILLIN SODIUM AND TAZOBACTAM SODIUM 25 GRAM(S): 3; .375 INJECTION, POWDER, LYOPHILIZED, FOR SOLUTION INTRAVENOUS at 07:06

## 2024-06-12 RX ADMIN — IPRATROPIUM BROMIDE AND ALBUTEROL SULFATE 3 MILLILITER(S): .5; 3 SOLUTION RESPIRATORY (INHALATION) at 08:10

## 2024-06-12 RX ADMIN — COLCHICINE 0.6 MILLIGRAM(S): 0.6 TABLET ORAL at 17:27

## 2024-06-12 RX ADMIN — NYSTATIN 500000 UNIT(S): 100000 SUSPENSION ORAL at 11:39

## 2024-06-12 RX ADMIN — COLCHICINE 0.6 MILLIGRAM(S): 0.6 TABLET ORAL at 05:37

## 2024-06-12 RX ADMIN — Medication 650 MILLIGRAM(S): at 04:42

## 2024-06-12 RX ADMIN — DIPHENHYDRAMINE HYDROCHLORIDE AND LIDOCAINE HYDROCHLORIDE AND ALUMINUM HYDROXIDE AND MAGNESIUM HYDRO 10 MILLILITER(S): KIT at 11:39

## 2024-06-12 RX ADMIN — PIPERACILLIN SODIUM AND TAZOBACTAM SODIUM 25 GRAM(S): 3; .375 INJECTION, POWDER, LYOPHILIZED, FOR SOLUTION INTRAVENOUS at 14:41

## 2024-06-12 RX ADMIN — MORPHINE SULFATE 30 MILLIGRAM(S): 100 TABLET, EXTENDED RELEASE ORAL at 04:42

## 2024-06-12 RX ADMIN — Medication 100 MILLIGRAM(S): at 05:36

## 2024-06-12 RX ADMIN — PANTOPRAZOLE SODIUM 40 MILLIGRAM(S): 40 INJECTION, POWDER, FOR SOLUTION INTRAVENOUS at 05:56

## 2024-06-12 RX ADMIN — Medication 650 MILLIGRAM(S): at 16:15

## 2024-06-12 RX ADMIN — VANCOMYCIN HYDROCHLORIDE 166.67 MILLIGRAM(S): KIT at 17:26

## 2024-06-12 RX ADMIN — Medication 1 MILLIGRAM(S): at 11:40

## 2024-06-13 ENCOUNTER — RESULT REVIEW (OUTPATIENT)
Age: 54
End: 2024-06-13

## 2024-06-13 DIAGNOSIS — Z86.711 PERSONAL HISTORY OF PULMONARY EMBOLISM: ICD-10-CM

## 2024-06-13 DIAGNOSIS — N17.9 ACUTE KIDNEY FAILURE, UNSPECIFIED: ICD-10-CM

## 2024-06-13 DIAGNOSIS — I30.9 ACUTE PERICARDITIS, UNSPECIFIED: ICD-10-CM

## 2024-06-13 DIAGNOSIS — D84.81 IMMUNODEFICIENCY DUE TO CONDITIONS CLASSIFIED ELSEWHERE: ICD-10-CM

## 2024-06-13 DIAGNOSIS — Z90.710 ACQUIRED ABSENCE OF BOTH CERVIX AND UTERUS: ICD-10-CM

## 2024-06-13 DIAGNOSIS — J44.0 CHRONIC OBSTRUCTIVE PULMONARY DISEASE WITH (ACUTE) LOWER RESPIRATORY INFECTION: ICD-10-CM

## 2024-06-13 DIAGNOSIS — D68.62 LUPUS ANTICOAGULANT SYNDROME: ICD-10-CM

## 2024-06-13 DIAGNOSIS — Z86.718 PERSONAL HISTORY OF OTHER VENOUS THROMBOSIS AND EMBOLISM: ICD-10-CM

## 2024-06-13 DIAGNOSIS — J98.11 ATELECTASIS: ICD-10-CM

## 2024-06-13 DIAGNOSIS — D68.61 ANTIPHOSPHOLIPID SYNDROME: ICD-10-CM

## 2024-06-13 DIAGNOSIS — Z95.810 PRESENCE OF AUTOMATIC (IMPLANTABLE) CARDIAC DEFIBRILLATOR: ICD-10-CM

## 2024-06-13 DIAGNOSIS — Z85.41 PERSONAL HISTORY OF MALIGNANT NEOPLASM OF CERVIX UTERI: ICD-10-CM

## 2024-06-13 DIAGNOSIS — I31.1 CHRONIC CONSTRICTIVE PERICARDITIS: ICD-10-CM

## 2024-06-13 DIAGNOSIS — J96.01 ACUTE RESPIRATORY FAILURE WITH HYPOXIA: ICD-10-CM

## 2024-06-13 DIAGNOSIS — I50.32 CHRONIC DIASTOLIC (CONGESTIVE) HEART FAILURE: ICD-10-CM

## 2024-06-13 DIAGNOSIS — Z95.820 PERIPHERAL VASCULAR ANGIOPLASTY STATUS WITH IMPLANTS AND GRAFTS: ICD-10-CM

## 2024-06-13 DIAGNOSIS — M51.16 INTERVERTEBRAL DISC DISORDERS WITH RADICULOPATHY, LUMBAR REGION: ICD-10-CM

## 2024-06-13 DIAGNOSIS — I31.4 CARDIAC TAMPONADE: ICD-10-CM

## 2024-06-13 DIAGNOSIS — T17.990A OTHER FOREIGN OBJECT IN RESPIRATORY TRACT, PART UNSPECIFIED IN CAUSING ASPHYXIATION, INITIAL ENCOUNTER: ICD-10-CM

## 2024-06-13 DIAGNOSIS — J90 PLEURAL EFFUSION, NOT ELSEWHERE CLASSIFIED: ICD-10-CM

## 2024-06-13 DIAGNOSIS — I11.0 HYPERTENSIVE HEART DISEASE WITH HEART FAILURE: ICD-10-CM

## 2024-06-13 DIAGNOSIS — Z98.890 OTHER SPECIFIED POSTPROCEDURAL STATES: ICD-10-CM

## 2024-06-13 DIAGNOSIS — I25.2 OLD MYOCARDIAL INFARCTION: ICD-10-CM

## 2024-06-13 DIAGNOSIS — Z90.49 ACQUIRED ABSENCE OF OTHER SPECIFIED PARTS OF DIGESTIVE TRACT: ICD-10-CM

## 2024-06-13 DIAGNOSIS — B37.0 CANDIDAL STOMATITIS: ICD-10-CM

## 2024-06-13 DIAGNOSIS — D64.9 ANEMIA, UNSPECIFIED: ICD-10-CM

## 2024-06-13 DIAGNOSIS — Z79.01 LONG TERM (CURRENT) USE OF ANTICOAGULANTS: ICD-10-CM

## 2024-06-13 DIAGNOSIS — K21.9 GASTRO-ESOPHAGEAL REFLUX DISEASE WITHOUT ESOPHAGITIS: ICD-10-CM

## 2024-06-13 DIAGNOSIS — T40.2X5A ADVERSE EFFECT OF OTHER OPIOIDS, INITIAL ENCOUNTER: ICD-10-CM

## 2024-06-13 DIAGNOSIS — Z88.8 ALLERGY STATUS TO OTHER DRUGS, MEDICAMENTS AND BIOLOGICAL SUBSTANCES: ICD-10-CM

## 2024-06-13 DIAGNOSIS — M32.9 SYSTEMIC LUPUS ERYTHEMATOSUS, UNSPECIFIED: ICD-10-CM

## 2024-06-13 DIAGNOSIS — R76.0 RAISED ANTIBODY TITER: ICD-10-CM

## 2024-06-13 DIAGNOSIS — R09.1 PLEURISY: ICD-10-CM

## 2024-06-13 DIAGNOSIS — E78.5 HYPERLIPIDEMIA, UNSPECIFIED: ICD-10-CM

## 2024-06-13 DIAGNOSIS — D72.828 OTHER ELEVATED WHITE BLOOD CELL COUNT: ICD-10-CM

## 2024-06-13 DIAGNOSIS — Z99.81 DEPENDENCE ON SUPPLEMENTAL OXYGEN: ICD-10-CM

## 2024-06-13 DIAGNOSIS — Z85.21 PERSONAL HISTORY OF MALIGNANT NEOPLASM OF LARYNX: ICD-10-CM

## 2024-06-13 DIAGNOSIS — T38.0X5A ADVERSE EFFECT OF GLUCOCORTICOIDS AND SYNTHETIC ANALOGUES, INITIAL ENCOUNTER: ICD-10-CM

## 2024-06-13 DIAGNOSIS — K59.03 DRUG INDUCED CONSTIPATION: ICD-10-CM

## 2024-06-13 DIAGNOSIS — M06.9 RHEUMATOID ARTHRITIS, UNSPECIFIED: ICD-10-CM
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
## 2024-06-13 DIAGNOSIS — J18.9 PNEUMONIA, UNSPECIFIED ORGANISM: ICD-10-CM

## 2024-06-13 DIAGNOSIS — Z79.891 LONG TERM (CURRENT) USE OF OPIATE ANALGESIC: ICD-10-CM

## 2024-06-13 DIAGNOSIS — F41.9 ANXIETY DISORDER, UNSPECIFIED: ICD-10-CM

## 2024-06-13 LAB
ALBUMIN SERPL ELPH-MCNC: 3.8 G/DL — SIGNIFICANT CHANGE UP (ref 3.5–5.2)
ALP SERPL-CCNC: 87 U/L — SIGNIFICANT CHANGE UP (ref 30–115)
ALT FLD-CCNC: 27 U/L — SIGNIFICANT CHANGE UP (ref 0–41)
ANION GAP SERPL CALC-SCNC: 15 MMOL/L — HIGH (ref 7–14)
AST SERPL-CCNC: 13 U/L — SIGNIFICANT CHANGE UP (ref 0–41)
BASOPHILS # BLD AUTO: 0.05 K/UL — SIGNIFICANT CHANGE UP (ref 0–0.2)
BASOPHILS NFR BLD AUTO: 0.3 % — SIGNIFICANT CHANGE UP (ref 0–1)
BILIRUB SERPL-MCNC: 0.5 MG/DL — SIGNIFICANT CHANGE UP (ref 0.2–1.2)
BUN SERPL-MCNC: 17 MG/DL — SIGNIFICANT CHANGE UP (ref 10–20)
C3 SERPL-MCNC: 192 MG/DL — HIGH (ref 81–157)
CALCIUM SERPL-MCNC: 8.8 MG/DL — SIGNIFICANT CHANGE UP (ref 8.4–10.5)
CHLORIDE SERPL-SCNC: 96 MMOL/L — LOW (ref 98–110)
CO2 SERPL-SCNC: 24 MMOL/L — SIGNIFICANT CHANGE UP (ref 17–32)
CREAT SERPL-MCNC: 1.2 MG/DL — SIGNIFICANT CHANGE UP (ref 0.7–1.5)
DSDNA AB SER-ACNC: <1 IU/ML — SIGNIFICANT CHANGE UP
EGFR: 54 ML/MIN/1.73M2 — LOW
EOSINOPHIL # BLD AUTO: 0.1 K/UL — SIGNIFICANT CHANGE UP (ref 0–0.7)
EOSINOPHIL NFR BLD AUTO: 0.5 % — SIGNIFICANT CHANGE UP (ref 0–8)
GLUCOSE SERPL-MCNC: 145 MG/DL — HIGH (ref 70–99)
HCT VFR BLD CALC: 34.8 % — LOW (ref 37–47)
HGB BLD-MCNC: 11.5 G/DL — LOW (ref 12–16)
IMM GRANULOCYTES NFR BLD AUTO: 1.8 % — HIGH (ref 0.1–0.3)
LEGIONELLA AG UR QL: NEGATIVE — SIGNIFICANT CHANGE UP
LYMPHOCYTES # BLD AUTO: 16.6 % — LOW (ref 20.5–51.1)
LYMPHOCYTES # BLD AUTO: 3.1 K/UL — SIGNIFICANT CHANGE UP (ref 1.2–3.4)
MAGNESIUM SERPL-MCNC: 1.9 MG/DL — SIGNIFICANT CHANGE UP (ref 1.8–2.4)
MCHC RBC-ENTMCNC: 29.1 PG — SIGNIFICANT CHANGE UP (ref 27–31)
MCHC RBC-ENTMCNC: 33 G/DL — SIGNIFICANT CHANGE UP (ref 32–37)
MCV RBC AUTO: 88.1 FL — SIGNIFICANT CHANGE UP (ref 81–99)
MONOCYTES # BLD AUTO: 2.86 K/UL — HIGH (ref 0.1–0.6)
MONOCYTES NFR BLD AUTO: 15.3 % — HIGH (ref 1.7–9.3)
NEUTROPHILS # BLD AUTO: 12.22 K/UL — HIGH (ref 1.4–6.5)
NEUTROPHILS NFR BLD AUTO: 65.5 % — SIGNIFICANT CHANGE UP (ref 42.2–75.2)
NRBC # BLD: 0 /100 WBCS — SIGNIFICANT CHANGE UP (ref 0–0)
PHOSPHATE SERPL-MCNC: 4 MG/DL — SIGNIFICANT CHANGE UP (ref 2.1–4.9)
PLATELET # BLD AUTO: 215 K/UL — SIGNIFICANT CHANGE UP (ref 130–400)
PMV BLD: 11.7 FL — HIGH (ref 7.4–10.4)
POTASSIUM SERPL-MCNC: 3.7 MMOL/L — SIGNIFICANT CHANGE UP (ref 3.5–5)
POTASSIUM SERPL-SCNC: 3.7 MMOL/L — SIGNIFICANT CHANGE UP (ref 3.5–5)
PROCALCITONIN SERPL-MCNC: 0.09 NG/ML — SIGNIFICANT CHANGE UP (ref 0.02–0.1)
PROT SERPL-MCNC: 6.2 G/DL — SIGNIFICANT CHANGE UP (ref 6–8)
RBC # BLD: 3.95 M/UL — LOW (ref 4.2–5.4)
RBC # FLD: 14.2 % — SIGNIFICANT CHANGE UP (ref 11.5–14.5)
S PNEUM AG UR QL: NEGATIVE — SIGNIFICANT CHANGE UP
SODIUM SERPL-SCNC: 135 MMOL/L — SIGNIFICANT CHANGE UP (ref 135–146)
VANCOMYCIN TROUGH SERPL-MCNC: 16.7 UG/ML — HIGH (ref 5–10)
WBC # BLD: 18.66 K/UL — HIGH (ref 4.8–10.8)
WBC # FLD AUTO: 18.66 K/UL — HIGH (ref 4.8–10.8)

## 2024-06-13 PROCEDURE — 93320 DOPPLER ECHO COMPLETE: CPT | Mod: 26

## 2024-06-13 PROCEDURE — 99232 SBSQ HOSP IP/OBS MODERATE 35: CPT

## 2024-06-13 PROCEDURE — 93325 DOPPLER ECHO COLOR FLOW MAPG: CPT | Mod: 26

## 2024-06-13 PROCEDURE — 93312 ECHO TRANSESOPHAGEAL: CPT | Mod: 26

## 2024-06-13 RX ORDER — SULFAMETHOXAZOLE AND TRIMETHOPRIM 800; 160 MG/1; MG/1
1 TABLET ORAL DAILY
Refills: 0 | Status: DISCONTINUED | OUTPATIENT
Start: 2024-06-13 | End: 2024-06-13

## 2024-06-13 RX ORDER — SULFAMETHOXAZOLE AND TRIMETHOPRIM 800; 160 MG/1; MG/1
1 TABLET ORAL
Refills: 0 | Status: DISCONTINUED | OUTPATIENT
Start: 2024-06-13 | End: 2024-06-18

## 2024-06-13 RX ADMIN — NYSTATIN 500000 UNIT(S): 100000 SUSPENSION ORAL at 17:21

## 2024-06-13 RX ADMIN — MORPHINE SULFATE 30 MILLIGRAM(S): 100 TABLET, EXTENDED RELEASE ORAL at 12:22

## 2024-06-13 RX ADMIN — ALPRAZOLAM 1 MILLIGRAM(S): 2 TABLET ORAL at 06:20

## 2024-06-13 RX ADMIN — DIPHENHYDRAMINE HYDROCHLORIDE AND LIDOCAINE HYDROCHLORIDE AND ALUMINUM HYDROXIDE AND MAGNESIUM HYDRO 10 MILLILITER(S): KIT at 05:21

## 2024-06-13 RX ADMIN — MORPHINE SULFATE 30 MILLIGRAM(S): 100 TABLET, EXTENDED RELEASE ORAL at 21:10

## 2024-06-13 RX ADMIN — ENOXAPARIN SODIUM 110 MILLIGRAM(S): 100 INJECTION SUBCUTANEOUS at 21:18

## 2024-06-13 RX ADMIN — MORPHINE SULFATE 30 MILLIGRAM(S): 100 TABLET, EXTENDED RELEASE ORAL at 00:41

## 2024-06-13 RX ADMIN — Medication 100 MILLIGRAM(S): at 05:22

## 2024-06-13 RX ADMIN — FUROSEMIDE 20 MILLIGRAM(S): 10 INJECTION, SOLUTION INTRAMUSCULAR; INTRAVENOUS at 05:21

## 2024-06-13 RX ADMIN — IPRATROPIUM BROMIDE AND ALBUTEROL SULFATE 3 MILLILITER(S): .5; 3 SOLUTION RESPIRATORY (INHALATION) at 13:26

## 2024-06-13 RX ADMIN — Medication 2 PUFF(S): at 20:40

## 2024-06-13 RX ADMIN — MORPHINE SULFATE 30 MILLIGRAM(S): 100 TABLET, EXTENDED RELEASE ORAL at 05:52

## 2024-06-13 RX ADMIN — IPRATROPIUM BROMIDE AND ALBUTEROL SULFATE 3 MILLILITER(S): .5; 3 SOLUTION RESPIRATORY (INHALATION) at 07:34

## 2024-06-13 RX ADMIN — Medication 1 MILLIGRAM(S): at 11:54

## 2024-06-13 RX ADMIN — PANTOPRAZOLE SODIUM 40 MILLIGRAM(S): 40 INJECTION, POWDER, FOR SOLUTION INTRAVENOUS at 05:22

## 2024-06-13 RX ADMIN — DIPHENHYDRAMINE HYDROCHLORIDE AND LIDOCAINE HYDROCHLORIDE AND ALUMINUM HYDROXIDE AND MAGNESIUM HYDRO 10 MILLILITER(S): KIT at 21:17

## 2024-06-13 RX ADMIN — MORPHINE SULFATE 30 MILLIGRAM(S): 100 TABLET, EXTENDED RELEASE ORAL at 11:54

## 2024-06-13 RX ADMIN — Medication 5 UNIT(S): at 10:36

## 2024-06-13 RX ADMIN — COLCHICINE 0.6 MILLIGRAM(S): 0.6 TABLET ORAL at 05:22

## 2024-06-13 RX ADMIN — NYSTATIN 500000 UNIT(S): 100000 SUSPENSION ORAL at 11:54

## 2024-06-13 RX ADMIN — MORPHINE SULFATE 30 MILLIGRAM(S): 100 TABLET, EXTENDED RELEASE ORAL at 05:22

## 2024-06-13 RX ADMIN — NYSTATIN 500000 UNIT(S): 100000 SUSPENSION ORAL at 21:17

## 2024-06-13 RX ADMIN — MORPHINE SULFATE 30 MILLIGRAM(S): 100 TABLET, EXTENDED RELEASE ORAL at 20:40

## 2024-06-13 RX ADMIN — PIPERACILLIN SODIUM AND TAZOBACTAM SODIUM 25 GRAM(S): 3; .375 INJECTION, POWDER, LYOPHILIZED, FOR SOLUTION INTRAVENOUS at 06:53

## 2024-06-13 RX ADMIN — NYSTATIN 500000 UNIT(S): 100000 SUSPENSION ORAL at 05:21

## 2024-06-13 RX ADMIN — MORPHINE SULFATE 30 MILLIGRAM(S): 100 TABLET, EXTENDED RELEASE ORAL at 16:29

## 2024-06-13 RX ADMIN — MORPHINE SULFATE 30 MILLIGRAM(S): 100 TABLET, EXTENDED RELEASE ORAL at 00:11

## 2024-06-13 RX ADMIN — MORPHINE SULFATE 30 MILLIGRAM(S): 100 TABLET, EXTENDED RELEASE ORAL at 23:53

## 2024-06-13 RX ADMIN — IPRATROPIUM BROMIDE AND ALBUTEROL SULFATE 3 MILLILITER(S): .5; 3 SOLUTION RESPIRATORY (INHALATION) at 04:24

## 2024-06-13 RX ADMIN — DIPHENHYDRAMINE HYDROCHLORIDE AND LIDOCAINE HYDROCHLORIDE AND ALUMINUM HYDROXIDE AND MAGNESIUM HYDRO 10 MILLILITER(S): KIT at 17:22

## 2024-06-13 RX ADMIN — VANCOMYCIN HYDROCHLORIDE 166.67 MILLIGRAM(S): KIT at 05:19

## 2024-06-13 RX ADMIN — COLCHICINE 0.6 MILLIGRAM(S): 0.6 TABLET ORAL at 17:21

## 2024-06-13 RX ADMIN — ALPRAZOLAM 1 MILLIGRAM(S): 2 TABLET ORAL at 21:54

## 2024-06-13 RX ADMIN — Medication 5000 UNIT(S): at 11:55

## 2024-06-13 RX ADMIN — PREDNISONE 40 MILLIGRAM(S): 10 TABLET ORAL at 05:21

## 2024-06-14 ENCOUNTER — TRANSCRIPTION ENCOUNTER (OUTPATIENT)
Age: 54
End: 2024-06-14

## 2024-06-14 LAB
ALBUMIN SERPL ELPH-MCNC: 3.3 G/DL — LOW (ref 3.5–5.2)
ALP SERPL-CCNC: 84 U/L — SIGNIFICANT CHANGE UP (ref 30–115)
ALT FLD-CCNC: 21 U/L — SIGNIFICANT CHANGE UP (ref 0–41)
ANION GAP SERPL CALC-SCNC: 14 MMOL/L — SIGNIFICANT CHANGE UP (ref 7–14)
AST SERPL-CCNC: 13 U/L — SIGNIFICANT CHANGE UP (ref 0–41)
BASOPHILS # BLD AUTO: 0.04 K/UL — SIGNIFICANT CHANGE UP (ref 0–0.2)
BASOPHILS NFR BLD AUTO: 0.3 % — SIGNIFICANT CHANGE UP (ref 0–1)
BILIRUB SERPL-MCNC: <0.2 MG/DL — SIGNIFICANT CHANGE UP (ref 0.2–1.2)
BUN SERPL-MCNC: 19 MG/DL — SIGNIFICANT CHANGE UP (ref 10–20)
CALCIUM SERPL-MCNC: 8.8 MG/DL — SIGNIFICANT CHANGE UP (ref 8.4–10.5)
CHLORIDE SERPL-SCNC: 102 MMOL/L — SIGNIFICANT CHANGE UP (ref 98–110)
CO2 SERPL-SCNC: 23 MMOL/L — SIGNIFICANT CHANGE UP (ref 17–32)
CREAT SERPL-MCNC: 1.1 MG/DL — SIGNIFICANT CHANGE UP (ref 0.7–1.5)
EGFR: 60 ML/MIN/1.73M2 — SIGNIFICANT CHANGE UP
EOSINOPHIL # BLD AUTO: 0.1 K/UL — SIGNIFICANT CHANGE UP (ref 0–0.7)
EOSINOPHIL NFR BLD AUTO: 0.7 % — SIGNIFICANT CHANGE UP (ref 0–8)
GLUCOSE SERPL-MCNC: 132 MG/DL — HIGH (ref 70–99)
HCT VFR BLD CALC: 30.6 % — LOW (ref 37–47)
HGB BLD-MCNC: 9.6 G/DL — LOW (ref 12–16)
IMM GRANULOCYTES NFR BLD AUTO: 1.4 % — HIGH (ref 0.1–0.3)
LYMPHOCYTES # BLD AUTO: 2.95 K/UL — SIGNIFICANT CHANGE UP (ref 1.2–3.4)
LYMPHOCYTES # BLD AUTO: 21.7 % — SIGNIFICANT CHANGE UP (ref 20.5–51.1)
MAGNESIUM SERPL-MCNC: 1.9 MG/DL — SIGNIFICANT CHANGE UP (ref 1.8–2.4)
MCHC RBC-ENTMCNC: 28.5 PG — SIGNIFICANT CHANGE UP (ref 27–31)
MCHC RBC-ENTMCNC: 31.4 G/DL — LOW (ref 32–37)
MCV RBC AUTO: 90.8 FL — SIGNIFICANT CHANGE UP (ref 81–99)
MONOCYTES # BLD AUTO: 1.64 K/UL — HIGH (ref 0.1–0.6)
MONOCYTES NFR BLD AUTO: 12 % — HIGH (ref 1.7–9.3)
NEUTROPHILS # BLD AUTO: 8.69 K/UL — HIGH (ref 1.4–6.5)
NEUTROPHILS NFR BLD AUTO: 63.9 % — SIGNIFICANT CHANGE UP (ref 42.2–75.2)
NRBC # BLD: 0 /100 WBCS — SIGNIFICANT CHANGE UP (ref 0–0)
PLATELET # BLD AUTO: 185 K/UL — SIGNIFICANT CHANGE UP (ref 130–400)
PMV BLD: 11.6 FL — HIGH (ref 7.4–10.4)
POTASSIUM SERPL-MCNC: 4 MMOL/L — SIGNIFICANT CHANGE UP (ref 3.5–5)
POTASSIUM SERPL-SCNC: 4 MMOL/L — SIGNIFICANT CHANGE UP (ref 3.5–5)
PROT SERPL-MCNC: 5.5 G/DL — LOW (ref 6–8)
RBC # BLD: 3.37 M/UL — LOW (ref 4.2–5.4)
RBC # FLD: 14 % — SIGNIFICANT CHANGE UP (ref 11.5–14.5)
SODIUM SERPL-SCNC: 139 MMOL/L — SIGNIFICANT CHANGE UP (ref 135–146)
T PALLIDUM AB TITR SER: NEGATIVE — SIGNIFICANT CHANGE UP
WBC # BLD: 13.61 K/UL — HIGH (ref 4.8–10.8)
WBC # FLD AUTO: 13.61 K/UL — HIGH (ref 4.8–10.8)

## 2024-06-14 PROCEDURE — 99232 SBSQ HOSP IP/OBS MODERATE 35: CPT

## 2024-06-14 RX ADMIN — MORPHINE SULFATE 30 MILLIGRAM(S): 100 TABLET, EXTENDED RELEASE ORAL at 20:38

## 2024-06-14 RX ADMIN — TIOTROPIUM BROMIDE 2 PUFF(S): 18 CAPSULE ORAL; RESPIRATORY (INHALATION) at 13:45

## 2024-06-14 RX ADMIN — NYSTATIN 500000 UNIT(S): 100000 SUSPENSION ORAL at 12:04

## 2024-06-14 RX ADMIN — MORPHINE SULFATE 30 MILLIGRAM(S): 100 TABLET, EXTENDED RELEASE ORAL at 17:03

## 2024-06-14 RX ADMIN — SULFAMETHOXAZOLE AND TRIMETHOPRIM 1 TABLET(S): 800; 160 TABLET ORAL at 06:00

## 2024-06-14 RX ADMIN — FUROSEMIDE 20 MILLIGRAM(S): 10 INJECTION, SOLUTION INTRAMUSCULAR; INTRAVENOUS at 05:59

## 2024-06-14 RX ADMIN — COLCHICINE 0.6 MILLIGRAM(S): 0.6 TABLET ORAL at 06:00

## 2024-06-14 RX ADMIN — DIPHENHYDRAMINE HYDROCHLORIDE AND LIDOCAINE HYDROCHLORIDE AND ALUMINUM HYDROXIDE AND MAGNESIUM HYDRO 10 MILLILITER(S): KIT at 17:34

## 2024-06-14 RX ADMIN — NYSTATIN 500000 UNIT(S): 100000 SUSPENSION ORAL at 06:00

## 2024-06-14 RX ADMIN — MORPHINE SULFATE 30 MILLIGRAM(S): 100 TABLET, EXTENDED RELEASE ORAL at 05:59

## 2024-06-14 RX ADMIN — Medication 5000 UNIT(S): at 12:04

## 2024-06-14 RX ADMIN — Medication 100 MILLIGRAM(S): at 05:59

## 2024-06-14 RX ADMIN — MORPHINE SULFATE 30 MILLIGRAM(S): 100 TABLET, EXTENDED RELEASE ORAL at 20:37

## 2024-06-14 RX ADMIN — MORPHINE SULFATE 30 MILLIGRAM(S): 100 TABLET, EXTENDED RELEASE ORAL at 08:21

## 2024-06-14 RX ADMIN — MORPHINE SULFATE 30 MILLIGRAM(S): 100 TABLET, EXTENDED RELEASE ORAL at 00:23

## 2024-06-14 RX ADMIN — ALPRAZOLAM 1 MILLIGRAM(S): 2 TABLET ORAL at 21:18

## 2024-06-14 RX ADMIN — MORPHINE SULFATE 30 MILLIGRAM(S): 100 TABLET, EXTENDED RELEASE ORAL at 23:21

## 2024-06-14 RX ADMIN — MORPHINE SULFATE 30 MILLIGRAM(S): 100 TABLET, EXTENDED RELEASE ORAL at 12:04

## 2024-06-14 RX ADMIN — ALPRAZOLAM 1 MILLIGRAM(S): 2 TABLET ORAL at 08:58

## 2024-06-14 RX ADMIN — PANTOPRAZOLE SODIUM 40 MILLIGRAM(S): 40 INJECTION, POWDER, FOR SOLUTION INTRAVENOUS at 05:59

## 2024-06-14 RX ADMIN — DIPHENHYDRAMINE HYDROCHLORIDE AND LIDOCAINE HYDROCHLORIDE AND ALUMINUM HYDROXIDE AND MAGNESIUM HYDRO 10 MILLILITER(S): KIT at 12:03

## 2024-06-14 RX ADMIN — COLCHICINE 0.6 MILLIGRAM(S): 0.6 TABLET ORAL at 17:34

## 2024-06-14 RX ADMIN — Medication 2 PUFF(S): at 20:38

## 2024-06-14 RX ADMIN — Medication 1 MILLIGRAM(S): at 12:09

## 2024-06-14 RX ADMIN — NYSTATIN 500000 UNIT(S): 100000 SUSPENSION ORAL at 17:34

## 2024-06-14 RX ADMIN — PREDNISONE 40 MILLIGRAM(S): 10 TABLET ORAL at 06:00

## 2024-06-14 RX ADMIN — DIPHENHYDRAMINE HYDROCHLORIDE AND LIDOCAINE HYDROCHLORIDE AND ALUMINUM HYDROXIDE AND MAGNESIUM HYDRO 10 MILLILITER(S): KIT at 06:00

## 2024-06-14 RX ADMIN — ENOXAPARIN SODIUM 110 MILLIGRAM(S): 100 INJECTION SUBCUTANEOUS at 21:18

## 2024-06-14 RX ADMIN — MORPHINE SULFATE 30 MILLIGRAM(S): 100 TABLET, EXTENDED RELEASE ORAL at 06:29

## 2024-06-14 RX ADMIN — MORPHINE SULFATE 30 MILLIGRAM(S): 100 TABLET, EXTENDED RELEASE ORAL at 23:19

## 2024-06-15 LAB
ALBUMIN SERPL ELPH-MCNC: 3.5 G/DL — SIGNIFICANT CHANGE UP (ref 3.5–5.2)
ALP SERPL-CCNC: 86 U/L — SIGNIFICANT CHANGE UP (ref 30–115)
ALT FLD-CCNC: 24 U/L — SIGNIFICANT CHANGE UP (ref 0–41)
ANION GAP SERPL CALC-SCNC: 15 MMOL/L — HIGH (ref 7–14)
AST SERPL-CCNC: 17 U/L — SIGNIFICANT CHANGE UP (ref 0–41)
BASOPHILS # BLD AUTO: 0.03 K/UL — SIGNIFICANT CHANGE UP (ref 0–0.2)
BASOPHILS NFR BLD AUTO: 0.2 % — SIGNIFICANT CHANGE UP (ref 0–1)
BILIRUB SERPL-MCNC: <0.2 MG/DL — SIGNIFICANT CHANGE UP (ref 0.2–1.2)
BUN SERPL-MCNC: 21 MG/DL — HIGH (ref 10–20)
CALCIUM SERPL-MCNC: 9.3 MG/DL — SIGNIFICANT CHANGE UP (ref 8.4–10.5)
CHLORIDE SERPL-SCNC: 103 MMOL/L — SIGNIFICANT CHANGE UP (ref 98–110)
CO2 SERPL-SCNC: 22 MMOL/L — SIGNIFICANT CHANGE UP (ref 17–32)
CREAT SERPL-MCNC: 1.1 MG/DL — SIGNIFICANT CHANGE UP (ref 0.7–1.5)
EGFR: 60 ML/MIN/1.73M2 — SIGNIFICANT CHANGE UP
EOSINOPHIL # BLD AUTO: 0.02 K/UL — SIGNIFICANT CHANGE UP (ref 0–0.7)
EOSINOPHIL NFR BLD AUTO: 0.1 % — SIGNIFICANT CHANGE UP (ref 0–8)
GLUCOSE SERPL-MCNC: 156 MG/DL — HIGH (ref 70–99)
HCT VFR BLD CALC: 32.1 % — LOW (ref 37–47)
HGB BLD-MCNC: 10.1 G/DL — LOW (ref 12–16)
IMM GRANULOCYTES NFR BLD AUTO: 0.9 % — HIGH (ref 0.1–0.3)
LYMPHOCYTES # BLD AUTO: 17.3 % — LOW (ref 20.5–51.1)
LYMPHOCYTES # BLD AUTO: 2.75 K/UL — SIGNIFICANT CHANGE UP (ref 1.2–3.4)
MCHC RBC-ENTMCNC: 28.4 PG — SIGNIFICANT CHANGE UP (ref 27–31)
MCHC RBC-ENTMCNC: 31.5 G/DL — LOW (ref 32–37)
MCV RBC AUTO: 90.2 FL — SIGNIFICANT CHANGE UP (ref 81–99)
MONOCYTES # BLD AUTO: 1.06 K/UL — HIGH (ref 0.1–0.6)
MONOCYTES NFR BLD AUTO: 6.6 % — SIGNIFICANT CHANGE UP (ref 1.7–9.3)
NEUTROPHILS # BLD AUTO: 11.93 K/UL — HIGH (ref 1.4–6.5)
NEUTROPHILS NFR BLD AUTO: 74.9 % — SIGNIFICANT CHANGE UP (ref 42.2–75.2)
NRBC # BLD: 0 /100 WBCS — SIGNIFICANT CHANGE UP (ref 0–0)
PLATELET # BLD AUTO: 234 K/UL — SIGNIFICANT CHANGE UP (ref 130–400)
PMV BLD: 11.8 FL — HIGH (ref 7.4–10.4)
POTASSIUM SERPL-MCNC: 4.2 MMOL/L — SIGNIFICANT CHANGE UP (ref 3.5–5)
POTASSIUM SERPL-SCNC: 4.2 MMOL/L — SIGNIFICANT CHANGE UP (ref 3.5–5)
PROT SERPL-MCNC: 5.8 G/DL — LOW (ref 6–8)
RBC # BLD: 3.56 M/UL — LOW (ref 4.2–5.4)
RBC # FLD: 13.8 % — SIGNIFICANT CHANGE UP (ref 11.5–14.5)
SODIUM SERPL-SCNC: 140 MMOL/L — SIGNIFICANT CHANGE UP (ref 135–146)
WBC # BLD: 15.94 K/UL — HIGH (ref 4.8–10.8)
WBC # FLD AUTO: 15.94 K/UL — HIGH (ref 4.8–10.8)

## 2024-06-15 PROCEDURE — 99232 SBSQ HOSP IP/OBS MODERATE 35: CPT

## 2024-06-15 RX ORDER — MORPHINE SULFATE 100 MG/1
30 TABLET, EXTENDED RELEASE ORAL
Refills: 0 | Status: DISCONTINUED | OUTPATIENT
Start: 2024-06-15 | End: 2024-06-18

## 2024-06-15 RX ORDER — IPRATROPIUM BROMIDE AND ALBUTEROL SULFATE .5; 3 MG/3ML; MG/3ML
3 SOLUTION RESPIRATORY (INHALATION) EVERY 6 HOURS
Refills: 0 | Status: DISCONTINUED | OUTPATIENT
Start: 2024-06-15 | End: 2024-06-18

## 2024-06-15 RX ORDER — MORPHINE SULFATE 100 MG/1
15 TABLET, EXTENDED RELEASE ORAL EVERY 12 HOURS
Refills: 0 | Status: DISCONTINUED | OUTPATIENT
Start: 2024-06-15 | End: 2024-06-18

## 2024-06-15 RX ADMIN — MORPHINE SULFATE 30 MILLIGRAM(S): 100 TABLET, EXTENDED RELEASE ORAL at 07:44

## 2024-06-15 RX ADMIN — ALPRAZOLAM 1 MILLIGRAM(S): 2 TABLET ORAL at 12:00

## 2024-06-15 RX ADMIN — ALPRAZOLAM 1 MILLIGRAM(S): 2 TABLET ORAL at 21:39

## 2024-06-15 RX ADMIN — Medication 2 PUFF(S): at 21:01

## 2024-06-15 RX ADMIN — DIPHENHYDRAMINE HYDROCHLORIDE AND LIDOCAINE HYDROCHLORIDE AND ALUMINUM HYDROXIDE AND MAGNESIUM HYDRO 10 MILLILITER(S): KIT at 17:16

## 2024-06-15 RX ADMIN — IPRATROPIUM BROMIDE AND ALBUTEROL SULFATE 3 MILLILITER(S): .5; 3 SOLUTION RESPIRATORY (INHALATION) at 21:01

## 2024-06-15 RX ADMIN — MORPHINE SULFATE 30 MILLIGRAM(S): 100 TABLET, EXTENDED RELEASE ORAL at 17:16

## 2024-06-15 RX ADMIN — MORPHINE SULFATE 15 MILLIGRAM(S): 100 TABLET, EXTENDED RELEASE ORAL at 21:40

## 2024-06-15 RX ADMIN — DIPHENHYDRAMINE HYDROCHLORIDE AND LIDOCAINE HYDROCHLORIDE AND ALUMINUM HYDROXIDE AND MAGNESIUM HYDRO 10 MILLILITER(S): KIT at 12:01

## 2024-06-15 RX ADMIN — MORPHINE SULFATE 30 MILLIGRAM(S): 100 TABLET, EXTENDED RELEASE ORAL at 12:00

## 2024-06-15 RX ADMIN — Medication 5 UNIT(S): at 14:35

## 2024-06-15 RX ADMIN — TIOTROPIUM BROMIDE 2 PUFF(S): 18 CAPSULE ORAL; RESPIRATORY (INHALATION) at 08:32

## 2024-06-15 RX ADMIN — NYSTATIN 500000 UNIT(S): 100000 SUSPENSION ORAL at 17:16

## 2024-06-15 RX ADMIN — DIPHENHYDRAMINE HYDROCHLORIDE AND LIDOCAINE HYDROCHLORIDE AND ALUMINUM HYDROXIDE AND MAGNESIUM HYDRO 10 MILLILITER(S): KIT at 05:47

## 2024-06-15 RX ADMIN — MORPHINE SULFATE 30 MILLIGRAM(S): 100 TABLET, EXTENDED RELEASE ORAL at 12:02

## 2024-06-15 RX ADMIN — MORPHINE SULFATE 30 MILLIGRAM(S): 100 TABLET, EXTENDED RELEASE ORAL at 03:03

## 2024-06-15 RX ADMIN — COLCHICINE 0.6 MILLIGRAM(S): 0.6 TABLET ORAL at 05:47

## 2024-06-15 RX ADMIN — Medication 5000 UNIT(S): at 12:01

## 2024-06-15 RX ADMIN — NYSTATIN 500000 UNIT(S): 100000 SUSPENSION ORAL at 12:01

## 2024-06-15 RX ADMIN — Medication 100 MILLIGRAM(S): at 05:47

## 2024-06-15 RX ADMIN — MORPHINE SULFATE 30 MILLIGRAM(S): 100 TABLET, EXTENDED RELEASE ORAL at 17:18

## 2024-06-15 RX ADMIN — Medication 1 MILLIGRAM(S): at 12:01

## 2024-06-15 RX ADMIN — FUROSEMIDE 20 MILLIGRAM(S): 10 INJECTION, SOLUTION INTRAMUSCULAR; INTRAVENOUS at 05:47

## 2024-06-15 RX ADMIN — COLCHICINE 0.6 MILLIGRAM(S): 0.6 TABLET ORAL at 17:16

## 2024-06-15 RX ADMIN — PREDNISONE 40 MILLIGRAM(S): 10 TABLET ORAL at 05:47

## 2024-06-15 RX ADMIN — MORPHINE SULFATE 30 MILLIGRAM(S): 100 TABLET, EXTENDED RELEASE ORAL at 05:47

## 2024-06-15 RX ADMIN — NYSTATIN 500000 UNIT(S): 100000 SUSPENSION ORAL at 05:47

## 2024-06-15 RX ADMIN — ENOXAPARIN SODIUM 110 MILLIGRAM(S): 100 INJECTION SUBCUTANEOUS at 21:40

## 2024-06-15 RX ADMIN — PANTOPRAZOLE SODIUM 40 MILLIGRAM(S): 40 INJECTION, POWDER, FOR SOLUTION INTRAVENOUS at 05:47

## 2024-06-15 RX ADMIN — MORPHINE SULFATE 15 MILLIGRAM(S): 100 TABLET, EXTENDED RELEASE ORAL at 23:04

## 2024-06-16 LAB
ALBUMIN SERPL ELPH-MCNC: 4.1 G/DL — SIGNIFICANT CHANGE UP (ref 3.5–5.2)
ALP SERPL-CCNC: 95 U/L — SIGNIFICANT CHANGE UP (ref 30–115)
ALT FLD-CCNC: 29 U/L — SIGNIFICANT CHANGE UP (ref 0–41)
ANION GAP SERPL CALC-SCNC: 16 MMOL/L — HIGH (ref 7–14)
AST SERPL-CCNC: 16 U/L — SIGNIFICANT CHANGE UP (ref 0–41)
BASOPHILS # BLD AUTO: 0.05 K/UL — SIGNIFICANT CHANGE UP (ref 0–0.2)
BASOPHILS NFR BLD AUTO: 0.3 % — SIGNIFICANT CHANGE UP (ref 0–1)
BILIRUB SERPL-MCNC: <0.2 MG/DL — SIGNIFICANT CHANGE UP (ref 0.2–1.2)
BUN SERPL-MCNC: 24 MG/DL — HIGH (ref 10–20)
CALCIUM SERPL-MCNC: 10.1 MG/DL — SIGNIFICANT CHANGE UP (ref 8.4–10.5)
CHLORIDE SERPL-SCNC: 101 MMOL/L — SIGNIFICANT CHANGE UP (ref 98–110)
CO2 SERPL-SCNC: 21 MMOL/L — SIGNIFICANT CHANGE UP (ref 17–32)
CREAT SERPL-MCNC: 1.1 MG/DL — SIGNIFICANT CHANGE UP (ref 0.7–1.5)
CULTURE RESULTS: SIGNIFICANT CHANGE UP
CULTURE RESULTS: SIGNIFICANT CHANGE UP
EGFR: 60 ML/MIN/1.73M2 — SIGNIFICANT CHANGE UP
EOSINOPHIL # BLD AUTO: 0.01 K/UL — SIGNIFICANT CHANGE UP (ref 0–0.7)
EOSINOPHIL NFR BLD AUTO: 0.1 % — SIGNIFICANT CHANGE UP (ref 0–8)
GLUCOSE SERPL-MCNC: 109 MG/DL — HIGH (ref 70–99)
HCT VFR BLD CALC: 34.9 % — LOW (ref 37–47)
HGB BLD-MCNC: 11.4 G/DL — LOW (ref 12–16)
IMM GRANULOCYTES NFR BLD AUTO: 1.2 % — HIGH (ref 0.1–0.3)
LYMPHOCYTES # BLD AUTO: 23.6 % — SIGNIFICANT CHANGE UP (ref 20.5–51.1)
LYMPHOCYTES # BLD AUTO: 4.37 K/UL — HIGH (ref 1.2–3.4)
MCHC RBC-ENTMCNC: 29.2 PG — SIGNIFICANT CHANGE UP (ref 27–31)
MCHC RBC-ENTMCNC: 32.7 G/DL — SIGNIFICANT CHANGE UP (ref 32–37)
MCV RBC AUTO: 89.3 FL — SIGNIFICANT CHANGE UP (ref 81–99)
MONOCYTES # BLD AUTO: 1.1 K/UL — HIGH (ref 0.1–0.6)
MONOCYTES NFR BLD AUTO: 5.9 % — SIGNIFICANT CHANGE UP (ref 1.7–9.3)
NEUTROPHILS # BLD AUTO: 12.78 K/UL — HIGH (ref 1.4–6.5)
NEUTROPHILS NFR BLD AUTO: 68.9 % — SIGNIFICANT CHANGE UP (ref 42.2–75.2)
NRBC # BLD: 0 /100 WBCS — SIGNIFICANT CHANGE UP (ref 0–0)
PLATELET # BLD AUTO: 313 K/UL — SIGNIFICANT CHANGE UP (ref 130–400)
PMV BLD: 11.4 FL — HIGH (ref 7.4–10.4)
POTASSIUM SERPL-MCNC: 4.6 MMOL/L — SIGNIFICANT CHANGE UP (ref 3.5–5)
POTASSIUM SERPL-SCNC: 4.6 MMOL/L — SIGNIFICANT CHANGE UP (ref 3.5–5)
PROT SERPL-MCNC: 6.7 G/DL — SIGNIFICANT CHANGE UP (ref 6–8)
RBC # BLD: 3.91 M/UL — LOW (ref 4.2–5.4)
RBC # FLD: 13.7 % — SIGNIFICANT CHANGE UP (ref 11.5–14.5)
SODIUM SERPL-SCNC: 138 MMOL/L — SIGNIFICANT CHANGE UP (ref 135–146)
SPECIMEN SOURCE: SIGNIFICANT CHANGE UP
SPECIMEN SOURCE: SIGNIFICANT CHANGE UP
WBC # BLD: 18.53 K/UL — HIGH (ref 4.8–10.8)
WBC # FLD AUTO: 18.53 K/UL — HIGH (ref 4.8–10.8)

## 2024-06-16 PROCEDURE — 99232 SBSQ HOSP IP/OBS MODERATE 35: CPT

## 2024-06-16 RX ADMIN — MORPHINE SULFATE 30 MILLIGRAM(S): 100 TABLET, EXTENDED RELEASE ORAL at 06:09

## 2024-06-16 RX ADMIN — MORPHINE SULFATE 30 MILLIGRAM(S): 100 TABLET, EXTENDED RELEASE ORAL at 11:38

## 2024-06-16 RX ADMIN — MORPHINE SULFATE 15 MILLIGRAM(S): 100 TABLET, EXTENDED RELEASE ORAL at 22:06

## 2024-06-16 RX ADMIN — MORPHINE SULFATE 30 MILLIGRAM(S): 100 TABLET, EXTENDED RELEASE ORAL at 06:00

## 2024-06-16 RX ADMIN — MORPHINE SULFATE 30 MILLIGRAM(S): 100 TABLET, EXTENDED RELEASE ORAL at 02:00

## 2024-06-16 RX ADMIN — ENOXAPARIN SODIUM 110 MILLIGRAM(S): 100 INJECTION SUBCUTANEOUS at 21:35

## 2024-06-16 RX ADMIN — DIPHENHYDRAMINE HYDROCHLORIDE AND LIDOCAINE HYDROCHLORIDE AND ALUMINUM HYDROXIDE AND MAGNESIUM HYDRO 10 MILLILITER(S): KIT at 06:01

## 2024-06-16 RX ADMIN — DIPHENHYDRAMINE HYDROCHLORIDE AND LIDOCAINE HYDROCHLORIDE AND ALUMINUM HYDROXIDE AND MAGNESIUM HYDRO 10 MILLILITER(S): KIT at 17:35

## 2024-06-16 RX ADMIN — PANTOPRAZOLE SODIUM 40 MILLIGRAM(S): 40 INJECTION, POWDER, FOR SOLUTION INTRAVENOUS at 06:00

## 2024-06-16 RX ADMIN — NYSTATIN 500000 UNIT(S): 100000 SUSPENSION ORAL at 06:01

## 2024-06-16 RX ADMIN — NYSTATIN 500000 UNIT(S): 100000 SUSPENSION ORAL at 17:36

## 2024-06-16 RX ADMIN — TIOTROPIUM BROMIDE 2 PUFF(S): 18 CAPSULE ORAL; RESPIRATORY (INHALATION) at 08:31

## 2024-06-16 RX ADMIN — COLCHICINE 0.6 MILLIGRAM(S): 0.6 TABLET ORAL at 17:36

## 2024-06-16 RX ADMIN — MORPHINE SULFATE 30 MILLIGRAM(S): 100 TABLET, EXTENDED RELEASE ORAL at 17:36

## 2024-06-16 RX ADMIN — MORPHINE SULFATE 30 MILLIGRAM(S): 100 TABLET, EXTENDED RELEASE ORAL at 00:05

## 2024-06-16 RX ADMIN — Medication 100 MILLIGRAM(S): at 06:00

## 2024-06-16 RX ADMIN — COLCHICINE 0.6 MILLIGRAM(S): 0.6 TABLET ORAL at 06:01

## 2024-06-16 RX ADMIN — DIPHENHYDRAMINE HYDROCHLORIDE AND LIDOCAINE HYDROCHLORIDE AND ALUMINUM HYDROXIDE AND MAGNESIUM HYDRO 10 MILLILITER(S): KIT at 11:29

## 2024-06-16 RX ADMIN — Medication 2 PUFF(S): at 11:29

## 2024-06-16 RX ADMIN — PREDNISONE 40 MILLIGRAM(S): 10 TABLET ORAL at 06:00

## 2024-06-16 RX ADMIN — NYSTATIN 500000 UNIT(S): 100000 SUSPENSION ORAL at 11:30

## 2024-06-16 RX ADMIN — Medication 1 MILLIGRAM(S): at 11:31

## 2024-06-16 RX ADMIN — IPRATROPIUM BROMIDE AND ALBUTEROL SULFATE 3 MILLILITER(S): .5; 3 SOLUTION RESPIRATORY (INHALATION) at 08:30

## 2024-06-16 RX ADMIN — FUROSEMIDE 20 MILLIGRAM(S): 10 INJECTION, SOLUTION INTRAMUSCULAR; INTRAVENOUS at 06:00

## 2024-06-16 RX ADMIN — IPRATROPIUM BROMIDE AND ALBUTEROL SULFATE 3 MILLILITER(S): .5; 3 SOLUTION RESPIRATORY (INHALATION) at 20:00

## 2024-06-17 PROCEDURE — 99232 SBSQ HOSP IP/OBS MODERATE 35: CPT

## 2024-06-17 RX ORDER — MYCOPHENOLATE MOFETIL 500 MG/1
1000 TABLET, FILM COATED ORAL
Refills: 0 | Status: DISCONTINUED | OUTPATIENT
Start: 2024-06-17 | End: 2024-06-18

## 2024-06-17 RX ADMIN — ALPRAZOLAM 1 MILLIGRAM(S): 2 TABLET ORAL at 08:01

## 2024-06-17 RX ADMIN — NYSTATIN 500000 UNIT(S): 100000 SUSPENSION ORAL at 12:09

## 2024-06-17 RX ADMIN — MORPHINE SULFATE 30 MILLIGRAM(S): 100 TABLET, EXTENDED RELEASE ORAL at 06:09

## 2024-06-17 RX ADMIN — MORPHINE SULFATE 30 MILLIGRAM(S): 100 TABLET, EXTENDED RELEASE ORAL at 12:09

## 2024-06-17 RX ADMIN — DIPHENHYDRAMINE HYDROCHLORIDE AND LIDOCAINE HYDROCHLORIDE AND ALUMINUM HYDROXIDE AND MAGNESIUM HYDRO 10 MILLILITER(S): KIT at 06:09

## 2024-06-17 RX ADMIN — COLCHICINE 0.6 MILLIGRAM(S): 0.6 TABLET ORAL at 17:37

## 2024-06-17 RX ADMIN — NYSTATIN 500000 UNIT(S): 100000 SUSPENSION ORAL at 17:30

## 2024-06-17 RX ADMIN — MORPHINE SULFATE 15 MILLIGRAM(S): 100 TABLET, EXTENDED RELEASE ORAL at 23:48

## 2024-06-17 RX ADMIN — MYCOPHENOLATE MOFETIL 1000 MILLIGRAM(S): 500 TABLET, FILM COATED ORAL at 17:30

## 2024-06-17 RX ADMIN — DIPHENHYDRAMINE HYDROCHLORIDE AND LIDOCAINE HYDROCHLORIDE AND ALUMINUM HYDROXIDE AND MAGNESIUM HYDRO 10 MILLILITER(S): KIT at 19:21

## 2024-06-17 RX ADMIN — SULFAMETHOXAZOLE AND TRIMETHOPRIM 1 TABLET(S): 800; 160 TABLET ORAL at 06:08

## 2024-06-17 RX ADMIN — COLCHICINE 0.6 MILLIGRAM(S): 0.6 TABLET ORAL at 06:09

## 2024-06-17 RX ADMIN — ALPRAZOLAM 1 MILLIGRAM(S): 2 TABLET ORAL at 00:06

## 2024-06-17 RX ADMIN — ENOXAPARIN SODIUM 110 MILLIGRAM(S): 100 INJECTION SUBCUTANEOUS at 21:42

## 2024-06-17 RX ADMIN — MORPHINE SULFATE 30 MILLIGRAM(S): 100 TABLET, EXTENDED RELEASE ORAL at 13:54

## 2024-06-17 RX ADMIN — PREDNISONE 40 MILLIGRAM(S): 10 TABLET ORAL at 06:08

## 2024-06-17 RX ADMIN — Medication 1 MILLIGRAM(S): at 12:09

## 2024-06-17 RX ADMIN — Medication 5000 UNIT(S): at 12:49

## 2024-06-17 RX ADMIN — MORPHINE SULFATE 30 MILLIGRAM(S): 100 TABLET, EXTENDED RELEASE ORAL at 00:06

## 2024-06-17 RX ADMIN — DIPHENHYDRAMINE HYDROCHLORIDE AND LIDOCAINE HYDROCHLORIDE AND ALUMINUM HYDROXIDE AND MAGNESIUM HYDRO 10 MILLILITER(S): KIT at 12:09

## 2024-06-17 RX ADMIN — Medication 100 MILLIGRAM(S): at 06:09

## 2024-06-17 RX ADMIN — FUROSEMIDE 20 MILLIGRAM(S): 10 INJECTION, SOLUTION INTRAMUSCULAR; INTRAVENOUS at 06:09

## 2024-06-17 RX ADMIN — MORPHINE SULFATE 15 MILLIGRAM(S): 100 TABLET, EXTENDED RELEASE ORAL at 22:18

## 2024-06-17 RX ADMIN — NYSTATIN 500000 UNIT(S): 100000 SUSPENSION ORAL at 06:09

## 2024-06-17 RX ADMIN — MORPHINE SULFATE 30 MILLIGRAM(S): 100 TABLET, EXTENDED RELEASE ORAL at 17:30

## 2024-06-17 RX ADMIN — PANTOPRAZOLE SODIUM 40 MILLIGRAM(S): 40 INJECTION, POWDER, FOR SOLUTION INTRAVENOUS at 06:09

## 2024-06-18 ENCOUNTER — TRANSCRIPTION ENCOUNTER (OUTPATIENT)
Age: 54
End: 2024-06-18

## 2024-06-18 VITALS
RESPIRATION RATE: 17 BRPM | SYSTOLIC BLOOD PRESSURE: 147 MMHG | TEMPERATURE: 98 F | DIASTOLIC BLOOD PRESSURE: 87 MMHG | HEART RATE: 78 BPM

## 2024-06-18 LAB
ANION GAP SERPL CALC-SCNC: 14 MMOL/L — SIGNIFICANT CHANGE UP (ref 7–14)
BASOPHILS # BLD AUTO: 0 K/UL — SIGNIFICANT CHANGE UP (ref 0–0.2)
BASOPHILS NFR BLD AUTO: 0 % — SIGNIFICANT CHANGE UP (ref 0–1)
BUN SERPL-MCNC: 24 MG/DL — HIGH (ref 10–20)
CALCIUM SERPL-MCNC: 9.6 MG/DL — SIGNIFICANT CHANGE UP (ref 8.4–10.5)
CHLORIDE SERPL-SCNC: 102 MMOL/L — SIGNIFICANT CHANGE UP (ref 98–110)
CO2 SERPL-SCNC: 26 MMOL/L — SIGNIFICANT CHANGE UP (ref 17–32)
CREAT SERPL-MCNC: 1 MG/DL — SIGNIFICANT CHANGE UP (ref 0.7–1.5)
EGFR: 67 ML/MIN/1.73M2 — SIGNIFICANT CHANGE UP
EOSINOPHIL # BLD AUTO: 0 K/UL — SIGNIFICANT CHANGE UP (ref 0–0.7)
EOSINOPHIL NFR BLD AUTO: 0 % — SIGNIFICANT CHANGE UP (ref 0–8)
GLUCOSE SERPL-MCNC: 101 MG/DL — HIGH (ref 70–99)
HCT VFR BLD CALC: 34.5 % — LOW (ref 37–47)
HGB BLD-MCNC: 11 G/DL — LOW (ref 12–16)
LYMPHOCYTES # BLD AUTO: 3.79 K/UL — HIGH (ref 1.2–3.4)
LYMPHOCYTES # BLD AUTO: 33.9 % — SIGNIFICANT CHANGE UP (ref 20.5–51.1)
MCHC RBC-ENTMCNC: 28.9 PG — SIGNIFICANT CHANGE UP (ref 27–31)
MCHC RBC-ENTMCNC: 31.9 G/DL — LOW (ref 32–37)
MCV RBC AUTO: 90.6 FL — SIGNIFICANT CHANGE UP (ref 81–99)
MONOCYTES # BLD AUTO: 0.77 K/UL — HIGH (ref 0.1–0.6)
MONOCYTES NFR BLD AUTO: 6.9 % — SIGNIFICANT CHANGE UP (ref 1.7–9.3)
NEUTROPHILS # BLD AUTO: 6.52 K/UL — HIGH (ref 1.4–6.5)
NEUTROPHILS NFR BLD AUTO: 58.3 % — SIGNIFICANT CHANGE UP (ref 42.2–75.2)
PLATELET # BLD AUTO: 279 K/UL — SIGNIFICANT CHANGE UP (ref 130–400)
PMV BLD: 10.8 FL — HIGH (ref 7.4–10.4)
POTASSIUM SERPL-MCNC: 4.2 MMOL/L — SIGNIFICANT CHANGE UP (ref 3.5–5)
POTASSIUM SERPL-SCNC: 4.2 MMOL/L — SIGNIFICANT CHANGE UP (ref 3.5–5)
RBC # BLD: 3.81 M/UL — LOW (ref 4.2–5.4)
RBC # FLD: 13.8 % — SIGNIFICANT CHANGE UP (ref 11.5–14.5)
SODIUM SERPL-SCNC: 142 MMOL/L — SIGNIFICANT CHANGE UP (ref 135–146)
WBC # BLD: 11.18 K/UL — HIGH (ref 4.8–10.8)
WBC # FLD AUTO: 11.18 K/UL — HIGH (ref 4.8–10.8)

## 2024-06-18 PROCEDURE — 99239 HOSP IP/OBS DSCHRG MGMT >30: CPT

## 2024-06-18 RX ORDER — ALPRAZOLAM 0.25 MG
1 TABLET ORAL
Qty: 0 | Refills: 0 | DISCHARGE

## 2024-06-18 RX ORDER — SULFAMETHOXAZOLE AND TRIMETHOPRIM 800; 160 MG/1; MG/1
1 TABLET ORAL
Qty: 13 | Refills: 0
Start: 2024-06-18 | End: 2024-07-17

## 2024-06-18 RX ORDER — MYCOPHENOLATE MOFETIL 500 MG/1
4 TABLET, FILM COATED ORAL
Qty: 240 | Refills: 0
Start: 2024-06-18 | End: 2024-07-17

## 2024-06-18 RX ORDER — PREDNISONE 10 MG/1
1 TABLET ORAL
Qty: 30 | Refills: 0
Start: 2024-06-18 | End: 2024-07-17

## 2024-06-18 RX ORDER — PREDNISONE 10 MG/1
1 TABLET ORAL
Qty: 35 | Refills: 0
Start: 2024-06-18 | End: 2024-07-01

## 2024-06-18 RX ORDER — NALOXONE HYDROCHLORIDE 1 MG/ML
1 INJECTION PARENTERAL
Qty: 1 | Refills: 0
Start: 2024-06-18 | End: 2024-06-20

## 2024-06-18 RX ORDER — MORPHINE SULFATE 50 MG/1
1 CAPSULE, EXTENDED RELEASE ORAL
Qty: 0 | Refills: 0 | DISCHARGE

## 2024-06-18 RX ADMIN — MORPHINE SULFATE 30 MILLIGRAM(S): 100 TABLET, EXTENDED RELEASE ORAL at 00:38

## 2024-06-18 RX ADMIN — MORPHINE SULFATE 30 MILLIGRAM(S): 100 TABLET, EXTENDED RELEASE ORAL at 00:09

## 2024-06-18 RX ADMIN — MORPHINE SULFATE 30 MILLIGRAM(S): 100 TABLET, EXTENDED RELEASE ORAL at 06:20

## 2024-06-18 RX ADMIN — PANTOPRAZOLE SODIUM 40 MILLIGRAM(S): 40 INJECTION, POWDER, FOR SOLUTION INTRAVENOUS at 06:21

## 2024-06-18 RX ADMIN — Medication 100 MILLIGRAM(S): at 06:20

## 2024-06-18 RX ADMIN — FUROSEMIDE 20 MILLIGRAM(S): 10 INJECTION, SOLUTION INTRAMUSCULAR; INTRAVENOUS at 06:21

## 2024-06-18 RX ADMIN — ALPRAZOLAM 1 MILLIGRAM(S): 2 TABLET ORAL at 00:35

## 2024-06-18 RX ADMIN — DIPHENHYDRAMINE HYDROCHLORIDE AND LIDOCAINE HYDROCHLORIDE AND ALUMINUM HYDROXIDE AND MAGNESIUM HYDRO 10 MILLILITER(S): KIT at 06:21

## 2024-06-18 RX ADMIN — MORPHINE SULFATE 30 MILLIGRAM(S): 100 TABLET, EXTENDED RELEASE ORAL at 07:00

## 2024-06-18 RX ADMIN — NYSTATIN 500000 UNIT(S): 100000 SUSPENSION ORAL at 11:44

## 2024-06-18 RX ADMIN — MORPHINE SULFATE 30 MILLIGRAM(S): 100 TABLET, EXTENDED RELEASE ORAL at 11:43

## 2024-06-18 RX ADMIN — MYCOPHENOLATE MOFETIL 1000 MILLIGRAM(S): 500 TABLET, FILM COATED ORAL at 06:20

## 2024-06-18 RX ADMIN — NYSTATIN 500000 UNIT(S): 100000 SUSPENSION ORAL at 06:24

## 2024-06-18 RX ADMIN — PREDNISONE 40 MILLIGRAM(S): 10 TABLET ORAL at 06:21

## 2024-06-18 RX ADMIN — Medication 5000 UNIT(S): at 11:43

## 2024-06-18 RX ADMIN — DIPHENHYDRAMINE HYDROCHLORIDE AND LIDOCAINE HYDROCHLORIDE AND ALUMINUM HYDROXIDE AND MAGNESIUM HYDRO 10 MILLILITER(S): KIT at 00:33

## 2024-06-18 RX ADMIN — Medication 1 MILLIGRAM(S): at 11:42

## 2024-06-18 RX ADMIN — DIPHENHYDRAMINE HYDROCHLORIDE AND LIDOCAINE HYDROCHLORIDE AND ALUMINUM HYDROXIDE AND MAGNESIUM HYDRO 10 MILLILITER(S): KIT at 12:16

## 2024-06-18 RX ADMIN — COLCHICINE 0.6 MILLIGRAM(S): 0.6 TABLET ORAL at 06:20

## 2024-06-18 RX ADMIN — MORPHINE SULFATE 30 MILLIGRAM(S): 100 TABLET, EXTENDED RELEASE ORAL at 12:15

## 2024-06-18 RX ADMIN — NYSTATIN 500000 UNIT(S): 100000 SUSPENSION ORAL at 00:10

## 2024-06-24 ENCOUNTER — INPATIENT (INPATIENT)
Facility: HOSPITAL | Age: 54
LOS: 3 days | Discharge: HOME | DRG: 316 | End: 2024-06-28
Attending: INTERNAL MEDICINE | Admitting: STUDENT IN AN ORGANIZED HEALTH CARE EDUCATION/TRAINING PROGRAM
Payer: MEDICARE

## 2024-06-24 VITALS
SYSTOLIC BLOOD PRESSURE: 123 MMHG | TEMPERATURE: 98 F | HEART RATE: 107 BPM | OXYGEN SATURATION: 98 % | HEIGHT: 63 IN | RESPIRATION RATE: 18 BRPM | DIASTOLIC BLOOD PRESSURE: 80 MMHG

## 2024-06-24 DIAGNOSIS — Z90.710 ACQUIRED ABSENCE OF BOTH CERVIX AND UTERUS: Chronic | ICD-10-CM

## 2024-06-24 DIAGNOSIS — Z95.810 PRESENCE OF AUTOMATIC (IMPLANTABLE) CARDIAC DEFIBRILLATOR: Chronic | ICD-10-CM

## 2024-06-24 DIAGNOSIS — Z98.890 OTHER SPECIFIED POSTPROCEDURAL STATES: Chronic | ICD-10-CM

## 2024-06-24 DIAGNOSIS — I77.1 STRICTURE OF ARTERY: Chronic | ICD-10-CM

## 2024-06-24 DIAGNOSIS — Z90.49 ACQUIRED ABSENCE OF OTHER SPECIFIED PARTS OF DIGESTIVE TRACT: Chronic | ICD-10-CM

## 2024-06-24 DIAGNOSIS — I31.39 OTHER PERICARDIAL EFFUSION (NONINFLAMMATORY): ICD-10-CM

## 2024-06-24 LAB
ALBUMIN SERPL ELPH-MCNC: 3.7 G/DL — SIGNIFICANT CHANGE UP (ref 3.5–5.2)
ALP SERPL-CCNC: 83 U/L — SIGNIFICANT CHANGE UP (ref 30–115)
ALT FLD-CCNC: 19 U/L — SIGNIFICANT CHANGE UP (ref 0–41)
ANION GAP SERPL CALC-SCNC: 14 MMOL/L — SIGNIFICANT CHANGE UP (ref 7–14)
AST SERPL-CCNC: 12 U/L — SIGNIFICANT CHANGE UP (ref 0–41)
BASOPHILS # BLD AUTO: 0.04 K/UL — SIGNIFICANT CHANGE UP (ref 0–0.2)
BASOPHILS NFR BLD AUTO: 0.4 % — SIGNIFICANT CHANGE UP (ref 0–1)
BILIRUB SERPL-MCNC: 0.3 MG/DL — SIGNIFICANT CHANGE UP (ref 0.2–1.2)
BUN SERPL-MCNC: 20 MG/DL — SIGNIFICANT CHANGE UP (ref 10–20)
CALCIUM SERPL-MCNC: 9.8 MG/DL — SIGNIFICANT CHANGE UP (ref 8.4–10.4)
CHLORIDE SERPL-SCNC: 97 MMOL/L — LOW (ref 98–110)
CO2 SERPL-SCNC: 26 MMOL/L — SIGNIFICANT CHANGE UP (ref 17–32)
CREAT SERPL-MCNC: 1 MG/DL — SIGNIFICANT CHANGE UP (ref 0.7–1.5)
CRP SERPL-MCNC: 98.6 MG/L — HIGH
EGFR: 67 ML/MIN/1.73M2 — SIGNIFICANT CHANGE UP
EOSINOPHIL # BLD AUTO: 0.11 K/UL — SIGNIFICANT CHANGE UP (ref 0–0.7)
EOSINOPHIL NFR BLD AUTO: 1.1 % — SIGNIFICANT CHANGE UP (ref 0–8)
ERYTHROCYTE [SEDIMENTATION RATE] IN BLOOD: 65 MM/HR — HIGH (ref 0–20)
GLUCOSE SERPL-MCNC: 95 MG/DL — SIGNIFICANT CHANGE UP (ref 70–99)
HCT VFR BLD CALC: 36.2 % — LOW (ref 37–47)
HGB BLD-MCNC: 11.8 G/DL — LOW (ref 12–16)
IMM GRANULOCYTES NFR BLD AUTO: 1.2 % — HIGH (ref 0.1–0.3)
LYMPHOCYTES # BLD AUTO: 2.29 K/UL — SIGNIFICANT CHANGE UP (ref 1.2–3.4)
LYMPHOCYTES # BLD AUTO: 22.3 % — SIGNIFICANT CHANGE UP (ref 20.5–51.1)
MCHC RBC-ENTMCNC: 28.7 PG — SIGNIFICANT CHANGE UP (ref 27–31)
MCHC RBC-ENTMCNC: 32.6 G/DL — SIGNIFICANT CHANGE UP (ref 32–37)
MCV RBC AUTO: 88.1 FL — SIGNIFICANT CHANGE UP (ref 81–99)
MONOCYTES # BLD AUTO: 1.13 K/UL — HIGH (ref 0.1–0.6)
MONOCYTES NFR BLD AUTO: 11 % — HIGH (ref 1.7–9.3)
NEUTROPHILS # BLD AUTO: 6.6 K/UL — HIGH (ref 1.4–6.5)
NEUTROPHILS NFR BLD AUTO: 64 % — SIGNIFICANT CHANGE UP (ref 42.2–75.2)
NRBC # BLD: 0 /100 WBCS — SIGNIFICANT CHANGE UP (ref 0–0)
PLATELET # BLD AUTO: 249 K/UL — SIGNIFICANT CHANGE UP (ref 130–400)
PMV BLD: 10.8 FL — HIGH (ref 7.4–10.4)
POTASSIUM SERPL-MCNC: 4.2 MMOL/L — SIGNIFICANT CHANGE UP (ref 3.5–5)
POTASSIUM SERPL-SCNC: 4.2 MMOL/L — SIGNIFICANT CHANGE UP (ref 3.5–5)
PROT SERPL-MCNC: 6.7 G/DL — SIGNIFICANT CHANGE UP (ref 6–8)
RBC # BLD: 4.11 M/UL — LOW (ref 4.2–5.4)
RBC # FLD: 13.4 % — SIGNIFICANT CHANGE UP (ref 11.5–14.5)
SODIUM SERPL-SCNC: 137 MMOL/L — SIGNIFICANT CHANGE UP (ref 135–146)
TROPONIN T, HIGH SENSITIVITY RESULT: 7 NG/L — SIGNIFICANT CHANGE UP (ref 6–13)
TROPONIN T, HIGH SENSITIVITY RESULT: 8 NG/L — SIGNIFICANT CHANGE UP (ref 6–13)
WBC # BLD: 10.29 K/UL — SIGNIFICANT CHANGE UP (ref 4.8–10.8)
WBC # FLD AUTO: 10.29 K/UL — SIGNIFICANT CHANGE UP (ref 4.8–10.8)

## 2024-06-24 PROCEDURE — 99285 EMERGENCY DEPT VISIT HI MDM: CPT | Mod: GC

## 2024-06-24 PROCEDURE — 80048 BASIC METABOLIC PNL TOTAL CA: CPT

## 2024-06-24 PROCEDURE — 86038 ANTINUCLEAR ANTIBODIES: CPT

## 2024-06-24 PROCEDURE — 86225 DNA ANTIBODY NATIVE: CPT

## 2024-06-24 PROCEDURE — 83735 ASSAY OF MAGNESIUM: CPT

## 2024-06-24 PROCEDURE — 86160 COMPLEMENT ANTIGEN: CPT

## 2024-06-24 PROCEDURE — 71250 CT THORAX DX C-: CPT | Mod: 26

## 2024-06-24 PROCEDURE — 85730 THROMBOPLASTIN TIME PARTIAL: CPT

## 2024-06-24 PROCEDURE — 93308 TTE F-UP OR LMTD: CPT

## 2024-06-24 PROCEDURE — 83615 LACTATE (LD) (LDH) ENZYME: CPT

## 2024-06-24 PROCEDURE — 36415 COLL VENOUS BLD VENIPUNCTURE: CPT

## 2024-06-24 PROCEDURE — 71045 X-RAY EXAM CHEST 1 VIEW: CPT | Mod: 26

## 2024-06-24 PROCEDURE — 93010 ELECTROCARDIOGRAM REPORT: CPT

## 2024-06-24 PROCEDURE — 85025 COMPLETE CBC W/AUTO DIFF WBC: CPT

## 2024-06-24 PROCEDURE — 71250 CT THORAX DX C-: CPT | Mod: MC

## 2024-06-24 PROCEDURE — 93312 ECHO TRANSESOPHAGEAL: CPT | Mod: 26,52

## 2024-06-24 PROCEDURE — 80053 COMPREHEN METABOLIC PANEL: CPT

## 2024-06-24 PROCEDURE — 86235 NUCLEAR ANTIGEN ANTIBODY: CPT

## 2024-06-24 PROCEDURE — 85610 PROTHROMBIN TIME: CPT

## 2024-06-24 RX ORDER — COLCHICINE 0.6 MG/1
0.6 TABLET ORAL
Refills: 0 | Status: DISCONTINUED | OUTPATIENT
Start: 2024-06-24 | End: 2024-06-28

## 2024-06-24 RX ORDER — METHYLPREDNISOLONE ACETATE 20 MG/ML
40 VIAL (ML) INJECTION
Refills: 0 | Status: DISCONTINUED | OUTPATIENT
Start: 2024-06-24 | End: 2024-06-28

## 2024-06-24 RX ORDER — SULFAMETHOXAZOLE AND TRIMETHOPRIM 800; 160 MG/1; MG/1
1 TABLET ORAL
Refills: 0 | Status: DISCONTINUED | OUTPATIENT
Start: 2024-06-24 | End: 2024-06-28

## 2024-06-24 RX ORDER — MORPHINE SULFATE 100 MG/1
4 TABLET, EXTENDED RELEASE ORAL ONCE
Refills: 0 | Status: DISCONTINUED | OUTPATIENT
Start: 2024-06-24 | End: 2024-06-24

## 2024-06-24 RX ADMIN — MORPHINE SULFATE 4 MILLIGRAM(S): 100 TABLET, EXTENDED RELEASE ORAL at 19:38

## 2024-06-25 ENCOUNTER — APPOINTMENT (OUTPATIENT)
Dept: PULMONOLOGY | Facility: CLINIC | Age: 54
End: 2024-06-25

## 2024-06-25 LAB
ALBUMIN SERPL ELPH-MCNC: 4.2 G/DL — SIGNIFICANT CHANGE UP (ref 3.5–5.2)
ALP SERPL-CCNC: 80 U/L — SIGNIFICANT CHANGE UP (ref 30–115)
ALT FLD-CCNC: 16 U/L — SIGNIFICANT CHANGE UP (ref 0–41)
ANION GAP SERPL CALC-SCNC: 15 MMOL/L — HIGH (ref 7–14)
APTT BLD: 28.2 SEC — SIGNIFICANT CHANGE UP (ref 27–39.2)
AST SERPL-CCNC: 13 U/L — SIGNIFICANT CHANGE UP (ref 0–41)
BASOPHILS # BLD AUTO: 0.02 K/UL — SIGNIFICANT CHANGE UP (ref 0–0.2)
BASOPHILS NFR BLD AUTO: 0.1 % — SIGNIFICANT CHANGE UP (ref 0–1)
BILIRUB SERPL-MCNC: 0.3 MG/DL — SIGNIFICANT CHANGE UP (ref 0.2–1.2)
BUN SERPL-MCNC: 14 MG/DL — SIGNIFICANT CHANGE UP (ref 10–20)
CALCIUM SERPL-MCNC: 9.7 MG/DL — SIGNIFICANT CHANGE UP (ref 8.4–10.5)
CHLORIDE SERPL-SCNC: 95 MMOL/L — LOW (ref 98–110)
CO2 SERPL-SCNC: 23 MMOL/L — SIGNIFICANT CHANGE UP (ref 17–32)
CREAT SERPL-MCNC: 0.9 MG/DL — SIGNIFICANT CHANGE UP (ref 0.7–1.5)
EGFR: 76 ML/MIN/1.73M2 — SIGNIFICANT CHANGE UP
EOSINOPHIL # BLD AUTO: 0 K/UL — SIGNIFICANT CHANGE UP (ref 0–0.7)
EOSINOPHIL NFR BLD AUTO: 0 % — SIGNIFICANT CHANGE UP (ref 0–8)
GLUCOSE SERPL-MCNC: 204 MG/DL — HIGH (ref 70–99)
HCT VFR BLD CALC: 35 % — LOW (ref 37–47)
HGB BLD-MCNC: 11.5 G/DL — LOW (ref 12–16)
IMM GRANULOCYTES NFR BLD AUTO: 0.9 % — HIGH (ref 0.1–0.3)
INR BLD: 0.97 RATIO — SIGNIFICANT CHANGE UP (ref 0.65–1.3)
LDH SERPL L TO P-CCNC: 226 — SIGNIFICANT CHANGE UP (ref 50–242)
LYMPHOCYTES # BLD AUTO: 0.81 K/UL — LOW (ref 1.2–3.4)
LYMPHOCYTES # BLD AUTO: 5.8 % — LOW (ref 20.5–51.1)
MAGNESIUM SERPL-MCNC: 1.9 MG/DL — SIGNIFICANT CHANGE UP (ref 1.8–2.4)
MCHC RBC-ENTMCNC: 28.4 PG — SIGNIFICANT CHANGE UP (ref 27–31)
MCHC RBC-ENTMCNC: 32.9 G/DL — SIGNIFICANT CHANGE UP (ref 32–37)
MCV RBC AUTO: 86.4 FL — SIGNIFICANT CHANGE UP (ref 81–99)
MONOCYTES # BLD AUTO: 0.2 K/UL — SIGNIFICANT CHANGE UP (ref 0.1–0.6)
MONOCYTES NFR BLD AUTO: 1.4 % — LOW (ref 1.7–9.3)
NEUTROPHILS # BLD AUTO: 12.73 K/UL — HIGH (ref 1.4–6.5)
NEUTROPHILS NFR BLD AUTO: 91.8 % — HIGH (ref 42.2–75.2)
NRBC # BLD: 0 /100 WBCS — SIGNIFICANT CHANGE UP (ref 0–0)
PLATELET # BLD AUTO: 269 K/UL — SIGNIFICANT CHANGE UP (ref 130–400)
PMV BLD: 11.4 FL — HIGH (ref 7.4–10.4)
POTASSIUM SERPL-MCNC: 4.2 MMOL/L — SIGNIFICANT CHANGE UP (ref 3.5–5)
POTASSIUM SERPL-SCNC: 4.2 MMOL/L — SIGNIFICANT CHANGE UP (ref 3.5–5)
PROT SERPL-MCNC: 6.7 G/DL — SIGNIFICANT CHANGE UP (ref 6–8)
PROTHROM AB SERPL-ACNC: 11.1 SEC — SIGNIFICANT CHANGE UP (ref 9.95–12.87)
RBC # BLD: 4.05 M/UL — LOW (ref 4.2–5.4)
RBC # FLD: 13.2 % — SIGNIFICANT CHANGE UP (ref 11.5–14.5)
SODIUM SERPL-SCNC: 133 MMOL/L — LOW (ref 135–146)
WBC # BLD: 13.88 K/UL — HIGH (ref 4.8–10.8)
WBC # FLD AUTO: 13.88 K/UL — HIGH (ref 4.8–10.8)

## 2024-06-25 PROCEDURE — 99222 1ST HOSP IP/OBS MODERATE 55: CPT

## 2024-06-25 RX ORDER — MYCOPHENOLATE MOFETIL 500 MG/1
1000 TABLET, FILM COATED ORAL
Refills: 0 | Status: DISCONTINUED | OUTPATIENT
Start: 2024-06-25 | End: 2024-06-28

## 2024-06-25 RX ORDER — ALBUTEROL 90 MCG
2 AEROSOL REFILL (GRAM) INHALATION EVERY 6 HOURS
Refills: 0 | Status: DISCONTINUED | OUTPATIENT
Start: 2024-06-25 | End: 2024-06-28

## 2024-06-25 RX ORDER — METOPROLOL TARTRATE 50 MG
100 TABLET ORAL DAILY
Refills: 0 | Status: DISCONTINUED | OUTPATIENT
Start: 2024-06-25 | End: 2024-06-28

## 2024-06-25 RX ORDER — MORPHINE SULFATE 100 MG/1
15 TABLET, EXTENDED RELEASE ORAL
Refills: 0 | Status: DISCONTINUED | OUTPATIENT
Start: 2024-06-25 | End: 2024-06-28

## 2024-06-25 RX ORDER — PANTOPRAZOLE SODIUM 40 MG/10ML
40 INJECTION, POWDER, FOR SOLUTION INTRAVENOUS
Refills: 0 | Status: DISCONTINUED | OUTPATIENT
Start: 2024-06-25 | End: 2024-06-28

## 2024-06-25 RX ORDER — FOLIC ACID
1 POWDER (GRAM) MISCELLANEOUS DAILY
Refills: 0 | Status: DISCONTINUED | OUTPATIENT
Start: 2024-06-25 | End: 2024-06-28

## 2024-06-25 RX ORDER — MORPHINE SULFATE 100 MG/1
30 TABLET, EXTENDED RELEASE ORAL
Refills: 0 | Status: DISCONTINUED | OUTPATIENT
Start: 2024-06-25 | End: 2024-06-25

## 2024-06-25 RX ORDER — MORPHINE SULFATE 100 MG/1
30 TABLET, EXTENDED RELEASE ORAL
Refills: 0 | Status: DISCONTINUED | OUTPATIENT
Start: 2024-06-25 | End: 2024-06-26

## 2024-06-25 RX ORDER — ENOXAPARIN SODIUM 100 MG/ML
110 INJECTION SUBCUTANEOUS EVERY 24 HOURS
Refills: 0 | Status: DISCONTINUED | OUTPATIENT
Start: 2024-06-25 | End: 2024-06-28

## 2024-06-25 RX ORDER — ALPRAZOLAM 2 MG/1
1 TABLET ORAL
Refills: 0 | Status: DISCONTINUED | OUTPATIENT
Start: 2024-06-25 | End: 2024-06-28

## 2024-06-25 RX ORDER — NYSTATIN 100000 [USP'U]/ML
500000 SUSPENSION ORAL
Refills: 0 | Status: DISCONTINUED | OUTPATIENT
Start: 2024-06-25 | End: 2024-06-28

## 2024-06-25 RX ORDER — FUROSEMIDE 10 MG/ML
20 INJECTION, SOLUTION INTRAMUSCULAR; INTRAVENOUS DAILY
Refills: 0 | Status: DISCONTINUED | OUTPATIENT
Start: 2024-06-25 | End: 2024-06-28

## 2024-06-25 RX ORDER — NALOXONE HYDROCHLORIDE 1 MG/ML
1 INJECTION PARENTERAL
Refills: 0 | Status: DISCONTINUED | OUTPATIENT
Start: 2024-06-25 | End: 2024-06-28

## 2024-06-25 RX ORDER — DIPHENHYDRAMINE HYDROCHLORIDE AND LIDOCAINE HYDROCHLORIDE AND ALUMINUM HYDROXIDE AND MAGNESIUM HYDRO
10 KIT EVERY 6 HOURS
Refills: 0 | Status: DISCONTINUED | OUTPATIENT
Start: 2024-06-25 | End: 2024-06-28

## 2024-06-25 RX ADMIN — MYCOPHENOLATE MOFETIL 1000 MILLIGRAM(S): 500 TABLET, FILM COATED ORAL at 17:31

## 2024-06-25 RX ADMIN — NYSTATIN 500000 UNIT(S): 100000 SUSPENSION ORAL at 11:11

## 2024-06-25 RX ADMIN — Medication 40 MILLIGRAM(S): at 06:56

## 2024-06-25 RX ADMIN — MYCOPHENOLATE MOFETIL 1000 MILLIGRAM(S): 500 TABLET, FILM COATED ORAL at 06:55

## 2024-06-25 RX ADMIN — MORPHINE SULFATE 30 MILLIGRAM(S): 100 TABLET, EXTENDED RELEASE ORAL at 10:45

## 2024-06-25 RX ADMIN — Medication 1 MILLIGRAM(S): at 11:12

## 2024-06-25 RX ADMIN — DIPHENHYDRAMINE HYDROCHLORIDE AND LIDOCAINE HYDROCHLORIDE AND ALUMINUM HYDROXIDE AND MAGNESIUM HYDRO 10 MILLILITER(S): KIT at 23:20

## 2024-06-25 RX ADMIN — MORPHINE SULFATE 15 MILLIGRAM(S): 100 TABLET, EXTENDED RELEASE ORAL at 17:57

## 2024-06-25 RX ADMIN — Medication 5000 UNIT(S): at 11:17

## 2024-06-25 RX ADMIN — MORPHINE SULFATE 15 MILLIGRAM(S): 100 TABLET, EXTENDED RELEASE ORAL at 17:19

## 2024-06-25 RX ADMIN — MORPHINE SULFATE 30 MILLIGRAM(S): 100 TABLET, EXTENDED RELEASE ORAL at 09:51

## 2024-06-25 RX ADMIN — ENOXAPARIN SODIUM 110 MILLIGRAM(S): 100 INJECTION SUBCUTANEOUS at 09:51

## 2024-06-25 RX ADMIN — COLCHICINE 0.6 MILLIGRAM(S): 0.6 TABLET ORAL at 06:55

## 2024-06-25 RX ADMIN — DIPHENHYDRAMINE HYDROCHLORIDE AND LIDOCAINE HYDROCHLORIDE AND ALUMINUM HYDROXIDE AND MAGNESIUM HYDRO 10 MILLILITER(S): KIT at 06:57

## 2024-06-25 RX ADMIN — SULFAMETHOXAZOLE AND TRIMETHOPRIM 1 TABLET(S): 800; 160 TABLET ORAL at 06:54

## 2024-06-25 RX ADMIN — FUROSEMIDE 20 MILLIGRAM(S): 10 INJECTION, SOLUTION INTRAMUSCULAR; INTRAVENOUS at 06:55

## 2024-06-25 RX ADMIN — COLCHICINE 0.6 MILLIGRAM(S): 0.6 TABLET ORAL at 17:31

## 2024-06-25 RX ADMIN — NYSTATIN 500000 UNIT(S): 100000 SUSPENSION ORAL at 17:32

## 2024-06-25 RX ADMIN — PANTOPRAZOLE SODIUM 40 MILLIGRAM(S): 40 INJECTION, POWDER, FOR SOLUTION INTRAVENOUS at 07:50

## 2024-06-25 RX ADMIN — DIPHENHYDRAMINE HYDROCHLORIDE AND LIDOCAINE HYDROCHLORIDE AND ALUMINUM HYDROXIDE AND MAGNESIUM HYDRO 10 MILLILITER(S): KIT at 11:28

## 2024-06-25 RX ADMIN — MORPHINE SULFATE 30 MILLIGRAM(S): 100 TABLET, EXTENDED RELEASE ORAL at 18:19

## 2024-06-25 RX ADMIN — Medication 40 MILLIGRAM(S): at 17:32

## 2024-06-25 RX ADMIN — Medication 100 MILLIGRAM(S): at 09:51

## 2024-06-25 RX ADMIN — DIPHENHYDRAMINE HYDROCHLORIDE AND LIDOCAINE HYDROCHLORIDE AND ALUMINUM HYDROXIDE AND MAGNESIUM HYDRO 10 MILLILITER(S): KIT at 18:19

## 2024-06-25 RX ADMIN — NYSTATIN 500000 UNIT(S): 100000 SUSPENSION ORAL at 23:20

## 2024-06-25 RX ADMIN — NYSTATIN 500000 UNIT(S): 100000 SUSPENSION ORAL at 06:55

## 2024-06-25 RX ADMIN — MORPHINE SULFATE 30 MILLIGRAM(S): 100 TABLET, EXTENDED RELEASE ORAL at 03:24

## 2024-06-26 LAB
ANA TITR SER: NEGATIVE — SIGNIFICANT CHANGE UP
ANTI-RIBONUCLEAR PROTEIN: <0.2 AI — SIGNIFICANT CHANGE UP
C3 SERPL-MCNC: 236 MG/DL — HIGH (ref 81–157)
C4 SERPL-MCNC: 50 MG/DL — HIGH (ref 13–39)
CULTURE RESULTS: SIGNIFICANT CHANGE UP
DSDNA AB SER-ACNC: <1 IU/ML — SIGNIFICANT CHANGE UP
ENA SM AB FLD QL: <0.2 AI — SIGNIFICANT CHANGE UP
SPECIMEN SOURCE: SIGNIFICANT CHANGE UP

## 2024-06-26 PROCEDURE — 99232 SBSQ HOSP IP/OBS MODERATE 35: CPT

## 2024-06-26 RX ORDER — MORPHINE SULFATE 100 MG/1
30 TABLET, EXTENDED RELEASE ORAL
Refills: 0 | Status: DISCONTINUED | OUTPATIENT
Start: 2024-06-26 | End: 2024-06-28

## 2024-06-26 RX ORDER — MORPHINE SULFATE 100 MG/1
60 TABLET, EXTENDED RELEASE ORAL THREE TIMES A DAY
Refills: 0 | Status: DISCONTINUED | OUTPATIENT
Start: 2024-06-26 | End: 2024-06-26

## 2024-06-26 RX ADMIN — NYSTATIN 500000 UNIT(S): 100000 SUSPENSION ORAL at 05:57

## 2024-06-26 RX ADMIN — ALPRAZOLAM 1 MILLIGRAM(S): 2 TABLET ORAL at 17:04

## 2024-06-26 RX ADMIN — DIPHENHYDRAMINE HYDROCHLORIDE AND LIDOCAINE HYDROCHLORIDE AND ALUMINUM HYDROXIDE AND MAGNESIUM HYDRO 10 MILLILITER(S): KIT at 11:10

## 2024-06-26 RX ADMIN — DIPHENHYDRAMINE HYDROCHLORIDE AND LIDOCAINE HYDROCHLORIDE AND ALUMINUM HYDROXIDE AND MAGNESIUM HYDRO 10 MILLILITER(S): KIT at 05:58

## 2024-06-26 RX ADMIN — DIPHENHYDRAMINE HYDROCHLORIDE AND LIDOCAINE HYDROCHLORIDE AND ALUMINUM HYDROXIDE AND MAGNESIUM HYDRO 10 MILLILITER(S): KIT at 17:05

## 2024-06-26 RX ADMIN — MORPHINE SULFATE 30 MILLIGRAM(S): 100 TABLET, EXTENDED RELEASE ORAL at 09:02

## 2024-06-26 RX ADMIN — DIPHENHYDRAMINE HYDROCHLORIDE AND LIDOCAINE HYDROCHLORIDE AND ALUMINUM HYDROXIDE AND MAGNESIUM HYDRO 10 MILLILITER(S): KIT at 23:54

## 2024-06-26 RX ADMIN — FUROSEMIDE 20 MILLIGRAM(S): 10 INJECTION, SOLUTION INTRAMUSCULAR; INTRAVENOUS at 05:57

## 2024-06-26 RX ADMIN — Medication 5000 UNIT(S): at 11:06

## 2024-06-26 RX ADMIN — MORPHINE SULFATE 30 MILLIGRAM(S): 100 TABLET, EXTENDED RELEASE ORAL at 02:44

## 2024-06-26 RX ADMIN — MYCOPHENOLATE MOFETIL 1000 MILLIGRAM(S): 500 TABLET, FILM COATED ORAL at 17:04

## 2024-06-26 RX ADMIN — Medication 100 MILLIGRAM(S): at 05:57

## 2024-06-26 RX ADMIN — NYSTATIN 500000 UNIT(S): 100000 SUSPENSION ORAL at 17:03

## 2024-06-26 RX ADMIN — NYSTATIN 500000 UNIT(S): 100000 SUSPENSION ORAL at 11:04

## 2024-06-26 RX ADMIN — Medication 40 MILLIGRAM(S): at 11:26

## 2024-06-26 RX ADMIN — MORPHINE SULFATE 30 MILLIGRAM(S): 100 TABLET, EXTENDED RELEASE ORAL at 08:06

## 2024-06-26 RX ADMIN — NYSTATIN 500000 UNIT(S): 100000 SUSPENSION ORAL at 23:54

## 2024-06-26 RX ADMIN — Medication 40 MILLIGRAM(S): at 23:54

## 2024-06-26 RX ADMIN — MORPHINE SULFATE 30 MILLIGRAM(S): 100 TABLET, EXTENDED RELEASE ORAL at 18:04

## 2024-06-26 RX ADMIN — MORPHINE SULFATE 30 MILLIGRAM(S): 100 TABLET, EXTENDED RELEASE ORAL at 13:08

## 2024-06-26 RX ADMIN — ENOXAPARIN SODIUM 110 MILLIGRAM(S): 100 INJECTION SUBCUTANEOUS at 11:09

## 2024-06-26 RX ADMIN — MYCOPHENOLATE MOFETIL 1000 MILLIGRAM(S): 500 TABLET, FILM COATED ORAL at 05:57

## 2024-06-26 RX ADMIN — Medication 1 MILLIGRAM(S): at 11:03

## 2024-06-26 RX ADMIN — MORPHINE SULFATE 30 MILLIGRAM(S): 100 TABLET, EXTENDED RELEASE ORAL at 13:49

## 2024-06-26 RX ADMIN — ALPRAZOLAM 1 MILLIGRAM(S): 2 TABLET ORAL at 02:44

## 2024-06-26 RX ADMIN — MORPHINE SULFATE 15 MILLIGRAM(S): 100 TABLET, EXTENDED RELEASE ORAL at 17:36

## 2024-06-26 RX ADMIN — COLCHICINE 0.6 MILLIGRAM(S): 0.6 TABLET ORAL at 05:57

## 2024-06-26 RX ADMIN — COLCHICINE 0.6 MILLIGRAM(S): 0.6 TABLET ORAL at 17:03

## 2024-06-26 RX ADMIN — PANTOPRAZOLE SODIUM 40 MILLIGRAM(S): 40 INJECTION, POWDER, FOR SOLUTION INTRAVENOUS at 06:23

## 2024-06-27 ENCOUNTER — RESULT REVIEW (OUTPATIENT)
Age: 54
End: 2024-06-27

## 2024-06-27 LAB
ANION GAP SERPL CALC-SCNC: 17 MMOL/L — HIGH (ref 7–14)
BASOPHILS # BLD AUTO: 0.03 K/UL — SIGNIFICANT CHANGE UP (ref 0–0.2)
BASOPHILS NFR BLD AUTO: 0.1 % — SIGNIFICANT CHANGE UP (ref 0–1)
BUN SERPL-MCNC: 24 MG/DL — HIGH (ref 10–20)
CALCIUM SERPL-MCNC: 10.5 MG/DL — SIGNIFICANT CHANGE UP (ref 8.4–10.5)
CHLORIDE SERPL-SCNC: 100 MMOL/L — SIGNIFICANT CHANGE UP (ref 98–110)
CO2 SERPL-SCNC: 22 MMOL/L — SIGNIFICANT CHANGE UP (ref 17–32)
CREAT SERPL-MCNC: 1 MG/DL — SIGNIFICANT CHANGE UP (ref 0.7–1.5)
EGFR: 67 ML/MIN/1.73M2 — SIGNIFICANT CHANGE UP
EOSINOPHIL # BLD AUTO: 0 K/UL — SIGNIFICANT CHANGE UP (ref 0–0.7)
EOSINOPHIL NFR BLD AUTO: 0 % — SIGNIFICANT CHANGE UP (ref 0–8)
GLUCOSE SERPL-MCNC: 163 MG/DL — HIGH (ref 70–99)
HCT VFR BLD CALC: 36.8 % — LOW (ref 37–47)
HGB BLD-MCNC: 12.1 G/DL — SIGNIFICANT CHANGE UP (ref 12–16)
IMM GRANULOCYTES NFR BLD AUTO: 1 % — HIGH (ref 0.1–0.3)
LYMPHOCYTES # BLD AUTO: 1.74 K/UL — SIGNIFICANT CHANGE UP (ref 1.2–3.4)
LYMPHOCYTES # BLD AUTO: 7.7 % — LOW (ref 20.5–51.1)
MAGNESIUM SERPL-MCNC: 2.3 MG/DL — SIGNIFICANT CHANGE UP (ref 1.8–2.4)
MCHC RBC-ENTMCNC: 28.3 PG — SIGNIFICANT CHANGE UP (ref 27–31)
MCHC RBC-ENTMCNC: 32.9 G/DL — SIGNIFICANT CHANGE UP (ref 32–37)
MCV RBC AUTO: 86 FL — SIGNIFICANT CHANGE UP (ref 81–99)
MONOCYTES # BLD AUTO: 0.38 K/UL — SIGNIFICANT CHANGE UP (ref 0.1–0.6)
MONOCYTES NFR BLD AUTO: 1.7 % — SIGNIFICANT CHANGE UP (ref 1.7–9.3)
NEUTROPHILS # BLD AUTO: 20.13 K/UL — HIGH (ref 1.4–6.5)
NEUTROPHILS NFR BLD AUTO: 89.5 % — HIGH (ref 42.2–75.2)
NRBC # BLD: 0 /100 WBCS — SIGNIFICANT CHANGE UP (ref 0–0)
PLATELET # BLD AUTO: 474 K/UL — HIGH (ref 130–400)
PMV BLD: 11.1 FL — HIGH (ref 7.4–10.4)
POTASSIUM SERPL-MCNC: 4.8 MMOL/L — SIGNIFICANT CHANGE UP (ref 3.5–5)
POTASSIUM SERPL-SCNC: 4.8 MMOL/L — SIGNIFICANT CHANGE UP (ref 3.5–5)
RBC # BLD: 4.28 M/UL — SIGNIFICANT CHANGE UP (ref 4.2–5.4)
RBC # FLD: 13.2 % — SIGNIFICANT CHANGE UP (ref 11.5–14.5)
SODIUM SERPL-SCNC: 139 MMOL/L — SIGNIFICANT CHANGE UP (ref 135–146)
WBC # BLD: 22.5 K/UL — HIGH (ref 4.8–10.8)
WBC # FLD AUTO: 22.5 K/UL — HIGH (ref 4.8–10.8)

## 2024-06-27 PROCEDURE — 99232 SBSQ HOSP IP/OBS MODERATE 35: CPT

## 2024-06-27 RX ADMIN — Medication 100 MILLIGRAM(S): at 05:15

## 2024-06-27 RX ADMIN — Medication 1 MILLIGRAM(S): at 12:57

## 2024-06-27 RX ADMIN — MORPHINE SULFATE 30 MILLIGRAM(S): 100 TABLET, EXTENDED RELEASE ORAL at 06:01

## 2024-06-27 RX ADMIN — DIPHENHYDRAMINE HYDROCHLORIDE AND LIDOCAINE HYDROCHLORIDE AND ALUMINUM HYDROXIDE AND MAGNESIUM HYDRO 10 MILLILITER(S): KIT at 12:56

## 2024-06-27 RX ADMIN — MORPHINE SULFATE 30 MILLIGRAM(S): 100 TABLET, EXTENDED RELEASE ORAL at 19:00

## 2024-06-27 RX ADMIN — FUROSEMIDE 20 MILLIGRAM(S): 10 INJECTION, SOLUTION INTRAMUSCULAR; INTRAVENOUS at 05:14

## 2024-06-27 RX ADMIN — MORPHINE SULFATE 30 MILLIGRAM(S): 100 TABLET, EXTENDED RELEASE ORAL at 23:35

## 2024-06-27 RX ADMIN — MYCOPHENOLATE MOFETIL 1000 MILLIGRAM(S): 500 TABLET, FILM COATED ORAL at 05:19

## 2024-06-27 RX ADMIN — MORPHINE SULFATE 30 MILLIGRAM(S): 100 TABLET, EXTENDED RELEASE ORAL at 18:22

## 2024-06-27 RX ADMIN — MYCOPHENOLATE MOFETIL 1000 MILLIGRAM(S): 500 TABLET, FILM COATED ORAL at 18:21

## 2024-06-27 RX ADMIN — ALPRAZOLAM 1 MILLIGRAM(S): 2 TABLET ORAL at 13:06

## 2024-06-27 RX ADMIN — COLCHICINE 0.6 MILLIGRAM(S): 0.6 TABLET ORAL at 05:14

## 2024-06-27 RX ADMIN — SULFAMETHOXAZOLE AND TRIMETHOPRIM 1 TABLET(S): 800; 160 TABLET ORAL at 06:01

## 2024-06-27 RX ADMIN — PANTOPRAZOLE SODIUM 40 MILLIGRAM(S): 40 INJECTION, POWDER, FOR SOLUTION INTRAVENOUS at 06:01

## 2024-06-27 RX ADMIN — NYSTATIN 500000 UNIT(S): 100000 SUSPENSION ORAL at 05:15

## 2024-06-27 RX ADMIN — DIPHENHYDRAMINE HYDROCHLORIDE AND LIDOCAINE HYDROCHLORIDE AND ALUMINUM HYDROXIDE AND MAGNESIUM HYDRO 10 MILLILITER(S): KIT at 23:35

## 2024-06-27 RX ADMIN — Medication 40 MILLIGRAM(S): at 18:15

## 2024-06-27 RX ADMIN — ENOXAPARIN SODIUM 110 MILLIGRAM(S): 100 INJECTION SUBCUTANEOUS at 10:42

## 2024-06-27 RX ADMIN — MORPHINE SULFATE 30 MILLIGRAM(S): 100 TABLET, EXTENDED RELEASE ORAL at 14:00

## 2024-06-27 RX ADMIN — COLCHICINE 0.6 MILLIGRAM(S): 0.6 TABLET ORAL at 18:14

## 2024-06-27 RX ADMIN — MORPHINE SULFATE 30 MILLIGRAM(S): 100 TABLET, EXTENDED RELEASE ORAL at 12:55

## 2024-06-27 RX ADMIN — ALPRAZOLAM 1 MILLIGRAM(S): 2 TABLET ORAL at 00:02

## 2024-06-27 RX ADMIN — Medication 40 MILLIGRAM(S): at 05:16

## 2024-06-27 RX ADMIN — NYSTATIN 500000 UNIT(S): 100000 SUSPENSION ORAL at 18:14

## 2024-06-27 RX ADMIN — NYSTATIN 500000 UNIT(S): 100000 SUSPENSION ORAL at 23:35

## 2024-06-27 RX ADMIN — NYSTATIN 500000 UNIT(S): 100000 SUSPENSION ORAL at 12:56

## 2024-06-27 RX ADMIN — MORPHINE SULFATE 30 MILLIGRAM(S): 100 TABLET, EXTENDED RELEASE ORAL at 00:02

## 2024-06-27 RX ADMIN — Medication 5000 UNIT(S): at 12:57

## 2024-06-27 RX ADMIN — DIPHENHYDRAMINE HYDROCHLORIDE AND LIDOCAINE HYDROCHLORIDE AND ALUMINUM HYDROXIDE AND MAGNESIUM HYDRO 10 MILLILITER(S): KIT at 18:14

## 2024-06-28 ENCOUNTER — TRANSCRIPTION ENCOUNTER (OUTPATIENT)
Age: 54
End: 2024-06-28

## 2024-06-28 VITALS — RESPIRATION RATE: 18 BRPM | SYSTOLIC BLOOD PRESSURE: 146 MMHG | HEART RATE: 71 BPM | DIASTOLIC BLOOD PRESSURE: 81 MMHG

## 2024-06-28 PROCEDURE — 99239 HOSP IP/OBS DSCHRG MGMT >30: CPT

## 2024-06-28 RX ORDER — PREDNISONE 10 MG/1
1 TABLET ORAL
Qty: 0 | Refills: 0 | DISCHARGE

## 2024-06-28 RX ADMIN — MYCOPHENOLATE MOFETIL 1000 MILLIGRAM(S): 500 TABLET, FILM COATED ORAL at 05:46

## 2024-06-28 RX ADMIN — Medication 100 MILLIGRAM(S): at 05:46

## 2024-06-28 RX ADMIN — MORPHINE SULFATE 30 MILLIGRAM(S): 100 TABLET, EXTENDED RELEASE ORAL at 05:45

## 2024-06-28 RX ADMIN — MORPHINE SULFATE 30 MILLIGRAM(S): 100 TABLET, EXTENDED RELEASE ORAL at 12:23

## 2024-06-28 RX ADMIN — ALPRAZOLAM 1 MILLIGRAM(S): 2 TABLET ORAL at 00:26

## 2024-06-28 RX ADMIN — MORPHINE SULFATE 30 MILLIGRAM(S): 100 TABLET, EXTENDED RELEASE ORAL at 11:23

## 2024-06-28 RX ADMIN — NYSTATIN 500000 UNIT(S): 100000 SUSPENSION ORAL at 05:45

## 2024-06-28 RX ADMIN — FUROSEMIDE 20 MILLIGRAM(S): 10 INJECTION, SOLUTION INTRAMUSCULAR; INTRAVENOUS at 05:46

## 2024-06-28 RX ADMIN — DIPHENHYDRAMINE HYDROCHLORIDE AND LIDOCAINE HYDROCHLORIDE AND ALUMINUM HYDROXIDE AND MAGNESIUM HYDRO 10 MILLILITER(S): KIT at 05:45

## 2024-06-28 RX ADMIN — COLCHICINE 0.6 MILLIGRAM(S): 0.6 TABLET ORAL at 05:46

## 2024-06-28 RX ADMIN — MORPHINE SULFATE 30 MILLIGRAM(S): 100 TABLET, EXTENDED RELEASE ORAL at 00:05

## 2024-06-28 RX ADMIN — Medication 40 MILLIGRAM(S): at 05:45

## 2024-06-28 RX ADMIN — PANTOPRAZOLE SODIUM 40 MILLIGRAM(S): 40 INJECTION, POWDER, FOR SOLUTION INTRAVENOUS at 05:47

## 2024-06-28 RX ADMIN — ENOXAPARIN SODIUM 110 MILLIGRAM(S): 100 INJECTION SUBCUTANEOUS at 11:29

## 2024-06-28 RX ADMIN — Medication 1 MILLIGRAM(S): at 11:23

## 2024-06-28 RX ADMIN — Medication 5000 UNIT(S): at 11:22

## 2024-06-28 RX ADMIN — MORPHINE SULFATE 30 MILLIGRAM(S): 100 TABLET, EXTENDED RELEASE ORAL at 06:00

## 2024-06-29 LAB
CULTURE RESULTS: SIGNIFICANT CHANGE UP
SPECIMEN SOURCE: SIGNIFICANT CHANGE UP

## 2024-07-02 DIAGNOSIS — Z79.51 LONG TERM (CURRENT) USE OF INHALED STEROIDS: ICD-10-CM

## 2024-07-02 DIAGNOSIS — N17.9 ACUTE KIDNEY FAILURE, UNSPECIFIED: ICD-10-CM

## 2024-07-02 DIAGNOSIS — Z88.1 ALLERGY STATUS TO OTHER ANTIBIOTIC AGENTS STATUS: ICD-10-CM

## 2024-07-02 DIAGNOSIS — I11.0 HYPERTENSIVE HEART DISEASE WITH HEART FAILURE: ICD-10-CM

## 2024-07-02 DIAGNOSIS — Z88.8 ALLERGY STATUS TO OTHER DRUGS, MEDICAMENTS AND BIOLOGICAL SUBSTANCES: ICD-10-CM

## 2024-07-02 DIAGNOSIS — J98.11 ATELECTASIS: ICD-10-CM

## 2024-07-02 DIAGNOSIS — Z92.3 PERSONAL HISTORY OF IRRADIATION: ICD-10-CM

## 2024-07-02 DIAGNOSIS — I50.32 CHRONIC DIASTOLIC (CONGESTIVE) HEART FAILURE: ICD-10-CM

## 2024-07-02 DIAGNOSIS — I25.2 OLD MYOCARDIAL INFARCTION: ICD-10-CM

## 2024-07-02 DIAGNOSIS — Z98.890 OTHER SPECIFIED POSTPROCEDURAL STATES: ICD-10-CM

## 2024-07-02 DIAGNOSIS — E78.5 HYPERLIPIDEMIA, UNSPECIFIED: ICD-10-CM

## 2024-07-02 DIAGNOSIS — G89.29 OTHER CHRONIC PAIN: ICD-10-CM

## 2024-07-02 DIAGNOSIS — Z86.718 PERSONAL HISTORY OF OTHER VENOUS THROMBOSIS AND EMBOLISM: ICD-10-CM

## 2024-07-02 DIAGNOSIS — M06.9 RHEUMATOID ARTHRITIS, UNSPECIFIED: ICD-10-CM

## 2024-07-02 DIAGNOSIS — D68.61 ANTIPHOSPHOLIPID SYNDROME: ICD-10-CM

## 2024-07-02 DIAGNOSIS — Z86.711 PERSONAL HISTORY OF PULMONARY EMBOLISM: ICD-10-CM

## 2024-07-02 DIAGNOSIS — Z90.710 ACQUIRED ABSENCE OF BOTH CERVIX AND UTERUS: ICD-10-CM

## 2024-07-02 DIAGNOSIS — Z95.5 PRESENCE OF CORONARY ANGIOPLASTY IMPLANT AND GRAFT: ICD-10-CM

## 2024-07-02 DIAGNOSIS — J44.9 CHRONIC OBSTRUCTIVE PULMONARY DISEASE, UNSPECIFIED: ICD-10-CM

## 2024-07-02 DIAGNOSIS — I31.39 OTHER PERICARDIAL EFFUSION (NONINFLAMMATORY): ICD-10-CM

## 2024-07-02 DIAGNOSIS — R23.3 SPONTANEOUS ECCHYMOSES: ICD-10-CM

## 2024-07-02 DIAGNOSIS — E83.42 HYPOMAGNESEMIA: ICD-10-CM

## 2024-07-02 DIAGNOSIS — Z79.52 LONG TERM (CURRENT) USE OF SYSTEMIC STEROIDS: ICD-10-CM

## 2024-07-02 DIAGNOSIS — Z79.891 LONG TERM (CURRENT) USE OF OPIATE ANALGESIC: ICD-10-CM

## 2024-07-02 DIAGNOSIS — Z85.41 PERSONAL HISTORY OF MALIGNANT NEOPLASM OF CERVIX UTERI: ICD-10-CM

## 2024-07-02 DIAGNOSIS — Z90.49 ACQUIRED ABSENCE OF OTHER SPECIFIED PARTS OF DIGESTIVE TRACT: ICD-10-CM

## 2024-07-02 DIAGNOSIS — Z92.21 PERSONAL HISTORY OF ANTINEOPLASTIC CHEMOTHERAPY: ICD-10-CM

## 2024-07-02 DIAGNOSIS — Z95.810 PRESENCE OF AUTOMATIC (IMPLANTABLE) CARDIAC DEFIBRILLATOR: ICD-10-CM

## 2024-07-02 DIAGNOSIS — Z91.09 OTHER ALLERGY STATUS, OTHER THAN TO DRUGS AND BIOLOGICAL SUBSTANCES: ICD-10-CM

## 2024-07-02 DIAGNOSIS — Z79.899 OTHER LONG TERM (CURRENT) DRUG THERAPY: ICD-10-CM

## 2024-07-02 DIAGNOSIS — D84.81 IMMUNODEFICIENCY DUE TO CONDITIONS CLASSIFIED ELSEWHERE: ICD-10-CM

## 2024-07-02 DIAGNOSIS — M32.12 PERICARDITIS IN SYSTEMIC LUPUS ERYTHEMATOSUS: ICD-10-CM

## 2024-07-03 DIAGNOSIS — M06.9 RHEUMATOID ARTHRITIS, UNSPECIFIED: ICD-10-CM

## 2024-07-03 DIAGNOSIS — M54.9 DORSALGIA, UNSPECIFIED: ICD-10-CM

## 2024-07-03 DIAGNOSIS — B33.23 VIRAL PERICARDITIS: ICD-10-CM

## 2024-07-03 DIAGNOSIS — E78.5 HYPERLIPIDEMIA, UNSPECIFIED: ICD-10-CM

## 2024-07-03 DIAGNOSIS — Z86.711 PERSONAL HISTORY OF PULMONARY EMBOLISM: ICD-10-CM

## 2024-07-03 DIAGNOSIS — I50.32 CHRONIC DIASTOLIC (CONGESTIVE) HEART FAILURE: ICD-10-CM

## 2024-07-03 DIAGNOSIS — I11.0 HYPERTENSIVE HEART DISEASE WITH HEART FAILURE: ICD-10-CM

## 2024-07-03 DIAGNOSIS — Z92.21 PERSONAL HISTORY OF ANTINEOPLASTIC CHEMOTHERAPY: ICD-10-CM

## 2024-07-03 DIAGNOSIS — Z85.41 PERSONAL HISTORY OF MALIGNANT NEOPLASM OF CERVIX UTERI: ICD-10-CM

## 2024-07-03 DIAGNOSIS — J44.9 CHRONIC OBSTRUCTIVE PULMONARY DISEASE, UNSPECIFIED: ICD-10-CM

## 2024-07-03 DIAGNOSIS — Z79.52 LONG TERM (CURRENT) USE OF SYSTEMIC STEROIDS: ICD-10-CM

## 2024-07-03 DIAGNOSIS — Z79.891 LONG TERM (CURRENT) USE OF OPIATE ANALGESIC: ICD-10-CM

## 2024-07-03 DIAGNOSIS — I25.10 ATHEROSCLEROTIC HEART DISEASE OF NATIVE CORONARY ARTERY WITHOUT ANGINA PECTORIS: ICD-10-CM

## 2024-07-03 DIAGNOSIS — M32.12 PERICARDITIS IN SYSTEMIC LUPUS ERYTHEMATOSUS: ICD-10-CM

## 2024-07-03 DIAGNOSIS — D84.821 IMMUNODEFICIENCY DUE TO DRUGS: ICD-10-CM

## 2024-07-03 DIAGNOSIS — G89.29 OTHER CHRONIC PAIN: ICD-10-CM

## 2024-07-03 DIAGNOSIS — D68.61 ANTIPHOSPHOLIPID SYNDROME: ICD-10-CM

## 2024-07-03 DIAGNOSIS — Z79.51 LONG TERM (CURRENT) USE OF INHALED STEROIDS: ICD-10-CM

## 2024-07-03 DIAGNOSIS — Z79.899 OTHER LONG TERM (CURRENT) DRUG THERAPY: ICD-10-CM

## 2024-07-03 DIAGNOSIS — M32.9 SYSTEMIC LUPUS ERYTHEMATOSUS, UNSPECIFIED: ICD-10-CM

## 2024-07-15 RX ORDER — ENOXAPARIN SODIUM 120 MG/.8ML
120 INJECTION, SOLUTION SUBCUTANEOUS
Qty: 5 | Refills: 0 | Status: ACTIVE | COMMUNITY
Start: 2024-07-15 | End: 1900-01-01

## 2024-07-26 ENCOUNTER — APPOINTMENT (OUTPATIENT)
Dept: PULMONOLOGY | Facility: CLINIC | Age: 54
End: 2024-07-26
Payer: MEDICARE

## 2024-07-26 VITALS
HEART RATE: 77 BPM | DIASTOLIC BLOOD PRESSURE: 80 MMHG | SYSTOLIC BLOOD PRESSURE: 130 MMHG | OXYGEN SATURATION: 96 % | BODY MASS INDEX: 31.65 KG/M2 | WEIGHT: 172 LBS | HEIGHT: 62 IN | RESPIRATION RATE: 16 BRPM

## 2024-07-26 DIAGNOSIS — J45.909 UNSPECIFIED ASTHMA, UNCOMPLICATED: ICD-10-CM

## 2024-07-26 DIAGNOSIS — D68.59 OTHER PRIMARY THROMBOPHILIA: ICD-10-CM

## 2024-07-26 DIAGNOSIS — J42 UNSPECIFIED CHRONIC BRONCHITIS: ICD-10-CM

## 2024-07-26 DIAGNOSIS — G47.33 OBSTRUCTIVE SLEEP APNEA (ADULT) (PEDIATRIC): ICD-10-CM

## 2024-07-26 PROCEDURE — G2211 COMPLEX E/M VISIT ADD ON: CPT

## 2024-07-26 PROCEDURE — 99214 OFFICE O/P EST MOD 30 MIN: CPT

## 2024-07-26 RX ORDER — FLUTICASONE FUROATE, UMECLIDINIUM BROMIDE AND VILANTEROL TRIFENATATE 100; 62.5; 25 UG/1; UG/1; UG/1
100-62.5-25 POWDER RESPIRATORY (INHALATION)
Qty: 60 | Refills: 5 | Status: ACTIVE | COMMUNITY
Start: 2024-07-26 | End: 1900-01-01

## 2024-07-26 NOTE — DISCUSSION/SUMMARY
[FreeTextEntry1] : COPD STABLE ON TRELEGY RECURRENT ADMISSION RENEW TRELEGY LUCY/ HST APS ON LOVENOX CHEST CT HEMO/ VASCULAR REVIEWED

## 2024-07-26 NOTE — HISTORY OF PRESENT ILLNESS
[TextBox_4] :  COPD ON TRELEGY STABLE NOW SP ADMSSION X4 TO HOSPITAL FOR PLEURAL/ PERICARDIAL EFFUSION/ ON CELLCEPT/ COLCHICINE SP CARDIAC STENT SOB ON EXERTION ON LOVENOX

## 2024-08-05 ENCOUNTER — OUTPATIENT (OUTPATIENT)
Dept: OUTPATIENT SERVICES | Facility: HOSPITAL | Age: 54
LOS: 1 days | End: 2024-08-05
Payer: MEDICARE

## 2024-08-05 ENCOUNTER — LABORATORY RESULT (OUTPATIENT)
Age: 54
End: 2024-08-05

## 2024-08-05 ENCOUNTER — APPOINTMENT (OUTPATIENT)
Age: 54
End: 2024-08-05

## 2024-08-05 DIAGNOSIS — Z98.890 OTHER SPECIFIED POSTPROCEDURAL STATES: Chronic | ICD-10-CM

## 2024-08-05 DIAGNOSIS — Z90.49 ACQUIRED ABSENCE OF OTHER SPECIFIED PARTS OF DIGESTIVE TRACT: Chronic | ICD-10-CM

## 2024-08-05 DIAGNOSIS — M32.9 SYSTEMIC LUPUS ERYTHEMATOSUS, UNSPECIFIED: ICD-10-CM

## 2024-08-05 DIAGNOSIS — I77.1 STRICTURE OF ARTERY: Chronic | ICD-10-CM

## 2024-08-05 DIAGNOSIS — Z90.710 ACQUIRED ABSENCE OF BOTH CERVIX AND UTERUS: Chronic | ICD-10-CM

## 2024-08-05 DIAGNOSIS — Z95.810 PRESENCE OF AUTOMATIC (IMPLANTABLE) CARDIAC DEFIBRILLATOR: Chronic | ICD-10-CM

## 2024-08-05 PROBLEM — I82.509 CHRONIC DEEP VEIN THROMBOSIS (DVT): Status: ACTIVE | Noted: 2024-08-05

## 2024-08-05 PROCEDURE — 80053 COMPREHEN METABOLIC PANEL: CPT

## 2024-08-05 PROCEDURE — 85046 RETICYTE/HGB CONCENTRATE: CPT

## 2024-08-05 PROCEDURE — 99214 OFFICE O/P EST MOD 30 MIN: CPT

## 2024-08-05 PROCEDURE — 85027 COMPLETE CBC AUTOMATED: CPT

## 2024-08-05 PROCEDURE — 83615 LACTATE (LD) (LDH) ENZYME: CPT

## 2024-08-06 ENCOUNTER — OUTPATIENT (OUTPATIENT)
Dept: OUTPATIENT SERVICES | Facility: HOSPITAL | Age: 54
LOS: 1 days | End: 2024-08-06
Payer: MEDICARE

## 2024-08-06 ENCOUNTER — RESULT REVIEW (OUTPATIENT)
Age: 54
End: 2024-08-06

## 2024-08-06 DIAGNOSIS — M54.16 RADICULOPATHY, LUMBAR REGION: ICD-10-CM

## 2024-08-06 DIAGNOSIS — M54.50 LOW BACK PAIN, UNSPECIFIED: ICD-10-CM

## 2024-08-06 DIAGNOSIS — Z90.49 ACQUIRED ABSENCE OF OTHER SPECIFIED PARTS OF DIGESTIVE TRACT: Chronic | ICD-10-CM

## 2024-08-06 DIAGNOSIS — Z98.890 OTHER SPECIFIED POSTPROCEDURAL STATES: Chronic | ICD-10-CM

## 2024-08-06 DIAGNOSIS — Z95.810 PRESENCE OF AUTOMATIC (IMPLANTABLE) CARDIAC DEFIBRILLATOR: Chronic | ICD-10-CM

## 2024-08-06 DIAGNOSIS — Z00.8 ENCOUNTER FOR OTHER GENERAL EXAMINATION: ICD-10-CM

## 2024-08-06 DIAGNOSIS — Z90.710 ACQUIRED ABSENCE OF BOTH CERVIX AND UTERUS: Chronic | ICD-10-CM

## 2024-08-06 DIAGNOSIS — M32.9 SYSTEMIC LUPUS ERYTHEMATOSUS, UNSPECIFIED: ICD-10-CM

## 2024-08-06 DIAGNOSIS — I77.1 STRICTURE OF ARTERY: Chronic | ICD-10-CM

## 2024-08-06 PROCEDURE — 72131 CT LUMBAR SPINE W/O DYE: CPT | Mod: 26,MH

## 2024-08-06 PROCEDURE — 72131 CT LUMBAR SPINE W/O DYE: CPT | Mod: MH

## 2024-08-06 NOTE — ASSESSMENT
[FreeTextEntry1] : 1. Thrombophilia secondary to APS, with questionable LLE ?new edema? Otherwise, no evidence of any new thromboembolism. 2. SLE. Followed by her other physicians.  The situation was discussed with the patient and her . The patient should continue on anticoagulation with Lovenox for the time being although she wanted to switch back to Coumadin, but she was told that the control has been very erratic with that. She realized and will continue on Lovenox. Will repeat the duplex Doppler of the lower extremities and obtain further blood work including CBC, CMP, reticulocyte count, LDH. Further recommendations after the above completed.  All questions answered.  F/U in 4-6 months if all stable and as needed.

## 2024-08-06 NOTE — REVIEW OF SYSTEMS
[Fatigue] : fatigue [Vision Problems] : vision problems [Joint Pain] : joint pain [Joint Stiffness] : joint stiffness [Anxiety] : anxiety [Easy Bruising] : a tendency for easy bruising [Negative] : Neurological [FreeTextEntry3] : Blurriness [FreeTextEntry6] : May be decreased breath sounds at the bases

## 2024-08-06 NOTE — PHYSICAL EXAM
[Restricted in physically strenuous activity but ambulatory and able to carry out work of a light or sedentary nature] : Status 1- Restricted in physically strenuous activity but ambulatory and able to carry out work of a light or sedentary nature, e.g., light house work, office work [Normal] : grossly intact [de-identified] : But somewhat overweight. [de-identified] : May be slightly decreased breath sounds at the bases [de-identified] : Tenderness in both legs with slight edema suspected in the LLE [de-identified] : Anxious

## 2024-08-06 NOTE — HISTORY OF PRESENT ILLNESS
[Disease:__________________________] : Disease: [unfilled] [Date: ____________] : Patient's last distress assessment performed on [unfilled]. [6 - Distress Level] : Distress Level: 6 [ECOG Performance Status: 1 - Restricted in physically strenuous activity but ambulatory and able to carry out work of a light or sedentary nature] : Performance Status: 1 - Restricted in physically strenuous activity but ambulatory and able to carry out work of a light or sedentary nature, e.g., light house work, office work [de-identified] : The patient is coming for a follow up for her antiphospholipid syndrome. She is continuing with Lovenox but complaining of the injections although that seems to have controlled her thrombophilia better than Coumadin which has been very difficult to control and keep a therapeutic range of the INR. At present, she has her usual pains in the legs, some generalized malaise. She is concerned that the clots in the legs may be getting worse. Recently, she was admitted to the hospital with pericardial effusion which was drained. Apparently, she was hospitalized already 4 times this year for various reasons.

## 2024-08-07 DIAGNOSIS — M54.16 RADICULOPATHY, LUMBAR REGION: ICD-10-CM

## 2024-08-07 DIAGNOSIS — M54.50 LOW BACK PAIN, UNSPECIFIED: ICD-10-CM

## 2024-08-14 ENCOUNTER — APPOINTMENT (OUTPATIENT)
Dept: SLEEP CENTER | Facility: HOSPITAL | Age: 54
End: 2024-08-14
Payer: MEDICARE

## 2024-08-14 ENCOUNTER — OUTPATIENT (OUTPATIENT)
Dept: OUTPATIENT SERVICES | Facility: HOSPITAL | Age: 54
LOS: 1 days | Discharge: HOME | End: 2024-08-14
Payer: MEDICARE

## 2024-08-14 DIAGNOSIS — Z98.890 OTHER SPECIFIED POSTPROCEDURAL STATES: Chronic | ICD-10-CM

## 2024-08-14 DIAGNOSIS — Z95.810 PRESENCE OF AUTOMATIC (IMPLANTABLE) CARDIAC DEFIBRILLATOR: Chronic | ICD-10-CM

## 2024-08-14 DIAGNOSIS — G47.33 OBSTRUCTIVE SLEEP APNEA (ADULT) (PEDIATRIC): ICD-10-CM

## 2024-08-14 DIAGNOSIS — I77.1 STRICTURE OF ARTERY: Chronic | ICD-10-CM

## 2024-08-14 DIAGNOSIS — Z90.49 ACQUIRED ABSENCE OF OTHER SPECIFIED PARTS OF DIGESTIVE TRACT: Chronic | ICD-10-CM

## 2024-08-14 DIAGNOSIS — Z90.710 ACQUIRED ABSENCE OF BOTH CERVIX AND UTERUS: Chronic | ICD-10-CM

## 2024-08-14 PROCEDURE — 95800 SLP STDY UNATTENDED: CPT | Mod: 26

## 2024-08-14 PROCEDURE — 95806 SLEEP STUDY UNATT&RESP EFFT: CPT

## 2024-08-16 ENCOUNTER — RESULT REVIEW (OUTPATIENT)
Age: 54
End: 2024-08-16

## 2024-08-17 DIAGNOSIS — G47.33 OBSTRUCTIVE SLEEP APNEA (ADULT) (PEDIATRIC): ICD-10-CM

## 2024-08-21 NOTE — ED ADULT NURSE NOTE - PAIN RATING/NUMBER SCALE (0-10): REST
Hold anticoagulation for 2 weeks.    Will discharge patient with hewitt catheter, voiding trial at [facility] per MD.   6

## 2024-08-23 ENCOUNTER — APPOINTMENT (OUTPATIENT)
Age: 54
End: 2024-08-23

## 2024-08-28 ENCOUNTER — RESULT REVIEW (OUTPATIENT)
Age: 54
End: 2024-08-28

## 2024-08-28 ENCOUNTER — OUTPATIENT (OUTPATIENT)
Dept: OUTPATIENT SERVICES | Facility: HOSPITAL | Age: 54
LOS: 1 days | End: 2024-08-28
Payer: MEDICARE

## 2024-08-28 DIAGNOSIS — Z90.49 ACQUIRED ABSENCE OF OTHER SPECIFIED PARTS OF DIGESTIVE TRACT: Chronic | ICD-10-CM

## 2024-08-28 DIAGNOSIS — J90 PLEURAL EFFUSION, NOT ELSEWHERE CLASSIFIED: ICD-10-CM

## 2024-08-28 DIAGNOSIS — Z98.890 OTHER SPECIFIED POSTPROCEDURAL STATES: Chronic | ICD-10-CM

## 2024-08-28 DIAGNOSIS — I77.1 STRICTURE OF ARTERY: Chronic | ICD-10-CM

## 2024-08-28 DIAGNOSIS — Z00.8 ENCOUNTER FOR OTHER GENERAL EXAMINATION: ICD-10-CM

## 2024-08-28 DIAGNOSIS — Z95.810 PRESENCE OF AUTOMATIC (IMPLANTABLE) CARDIAC DEFIBRILLATOR: Chronic | ICD-10-CM

## 2024-08-28 DIAGNOSIS — Z90.710 ACQUIRED ABSENCE OF BOTH CERVIX AND UTERUS: Chronic | ICD-10-CM

## 2024-08-28 PROCEDURE — 71250 CT THORAX DX C-: CPT | Mod: 26,MH

## 2024-08-28 PROCEDURE — 71250 CT THORAX DX C-: CPT

## 2024-08-29 DIAGNOSIS — J90 PLEURAL EFFUSION, NOT ELSEWHERE CLASSIFIED: ICD-10-CM

## 2024-08-30 NOTE — ED ADULT TRIAGE NOTE - BP NONINVASIVE DIASTOLIC (MM HG)
"Critical Care Admission Note      10/02/2023    Name: Lidia Nam MRN#: [id number]   Age: 62 year old YOB: 1961                    Problem List:   Principal Problem:    Severe sepsis (H)  Active Problems:    Necrotizing soft tissue infection    Clinically Significant Risk Factors Present on Admission         # Hyponatremia: Lowest Na = 128 mmol/L in last 2 days, will monitor as appropriate   # Hypercalcemia: corrected calcium is >10.1, will monitor as appropriate    # Hypoalbuminemia: Lowest albumin = 2.2 g/dL at 10/2/2023  6:25 PM, will monitor as appropriate  # Coagulation Defect: INR = 1.26 (Ref range: 0.85 - 1.15) and/or PTT = 28 Seconds (Ref range: 22 - 38 Seconds), will monitor for bleeding        # DMII: A1C = 6.8 % (Ref range: 0.0 - 5.6 %) within past 6 months    # Obesity: Estimated body mass index is 36.76 kg/m  as calculated from the following:    Height as of this encounter: 1.575 m (5' 2\").    Weight as of this encounter: 91.2 kg (201 lb).                        HPI:     62-year-old female with a history of hypothyroidism, diabetes mellitus and obesity who presented with groin pain to the emergency room.  Patient has been complaining of shortness of breath and a wound in her left inguinal area that has been draining pus and is severely malodorous.  She was immediately given antibiotics and had a CT done that showed necrotizing fasciitis.    She was emergently taken to the operating room where she underwent debridement.  Arrives to the ICU intubated on pressors    She was intubated in the OR by ENT due to significant goiter.        Assessment and plan :       I have personally reviewed the labs, imaging studies, cultures and discussed the case with referring physician and consulting physicians.     My assessment and plan by system for this patient is as follows:    Neurology/Psychiatry:   Will sedate with fentanyl and Precedex      Cardiovascular:   Septic shock.  Received over 3 L of " Nutrition Note: Aware pt downgraded from ICU, awaiting transfer to floor bed. Chart reviewed; RD to follow up with full nutrition assessment per medical floor protocol. crystalloid, 500 mL of colloid  Another liter when she arrived here to the ICU.  Continue nor epi and vaso-    Continue LR      Pulmonary/Ventilator Management:   She will remain intubated and ventilated  Tidal volume at 400 mL which is 8 mL/kg  Rate of 20  PEEP at 5.  Check ABG    Possible left upper lobe pneumonia      GI and Nutrition :   Insert OG tube      Renal/Fluids/Electrolytes:     - monitor function and electrolytes as needed with replacement per ICU protocols. - generally avoid nephrotoxic agents such as NSAID, IV contrast unless specifically required  - adjust medications as needed for renal clearance  - follow I/O's as appropriate.    Infectious Disease:   Necrotizing fasciitis of left groin that is tracking all the way down her thigh, status post debridement  Continue vancomycin, Zosyn and clindamycin  Awaiting cultures.      Endocrine:   Diabetes mellitus.  Sliding scale insulin  Hypothyroidism on levothyroxine with significant goiter.  We will check TSH and free T4  Review levothyroxine for now  Plan  - ICU insulin protocol, goal sugar <180     ICU Prophylaxis:   1. DVT: Mechanical.  Start low molecular weight heparin when okay with surgery  2. VAP: HOB 30 degrees, chlorhexidine rinse  3. Stress Ulcer: PPI  4. Restraints: Nonviolent soft two point restraints required and necessary for patient safety and continued cares and good effect as patient continues to pull at necessary lines, tubes despite education and distraction. Will readdress daily.     Category: Non-violent   Type of Restraint: Soft limb x2.   Behavior: Pulling at IVand motta catheter tubings.   Root cause of behavior: Critical illness.   Less-restrictive methods that have failed: Redirection, reorientation. 1:1 NA at bedside.   Response to restraint: Not actively pulling atcurrent restraints.   Criteria for release from restraint: Responds to redirection. Leaves medical devices in place.                    Medical History:     History  reviewed. No pertinent past medical history.  Past Surgical History:   Procedure Laterality Date    [facility] APPENDECTOMY      Description: Appendectomy;  Recorded: 11/21/2008;    [facility] LIGATE FALLOPIAN TUBE      Description: Tubal Ligation;  Recorded: 11/21/2008;     Social History     Socioeconomic History    Marital status:      Spouse name: Not on file    Number of children: Not on file    Years of education: Not on file    Highest education level: Not on file   Occupational History    Not on file   Tobacco Use    Smoking status: Every Day     Packs/day: 1.00     Years: 39.00     Pack years: 39.00     Types: Cigarettes    Smokeless tobacco: Never   Substance and Sexual Activity    Alcohol use: Yes    Drug use: Not on file    Sexual activity: Not on file   Other Topics Concern    Not on file   Social History Narrative    Not on file     Social Determinants of Health     Financial Resource Strain: Not on file   Food Insecurity: Not on file   Transportation Needs: Not on file   Physical Activity: Not on file   Stress: Not on file   Social Connections: Not on file   Interpersonal Safety: Not on file   Housing Stability: Not on file        Allergies   Allergen Reactions    Erythromycin     Psyllium     Seasonal Allergies      Hay fever - tree pollens, dust, mold, mildew              Key Medications:      [START ON 10/3/2023] clindamycin  900 mg Intravenous Q8H    [START ON 10/3/2023] insulin aspart  1-6 Units Subcutaneous Q4H    [START ON 10/3/2023] levothyroxine  112 mcg Oral Daily    [START ON 10/3/2023] pantoprazole  40 mg Intravenous Q24H    [START ON 10/3/2023] piperacillin-tazobactam  3.375 g Intravenous Q8H      ceFAZolin (ANCEF) 1 g, gentamicin (GARAMYCIN) 80 mg, polymyxin B 500,000 Units in sodium chloride 0.9% irrigation (bag) 1,000 mL for irrigation      [START ON 10/3/2023] dexmedeTOMIDine      [START ON 10/3/2023] fentaNYL      [START ON 10/3/2023] lactated ringers      [START ON 10/3/2023]  norepinephrine      [START ON 10/3/2023] vasopressin          Home Meds  No current facility-administered medications on file prior to encounter.  furosemide (LASIX) 20 MG tablet, Take 1 tablet (20 mg) by mouth 2 times daily  levothyroxine (SYNTHROID/LEVOTHROID) 112 MCG tablet, Take 1 tablet (112 mcg) by mouth daily  metFORMIN (GLUCOPHAGE XR) 500 MG 24 hr tablet, Take 2 tablets (1,000 mg) by mouth daily (with dinner)               Physical Examination:   Temp:  [98  F (36.7  C)-98.7  F (37.1  C)] 98.7  F (37.1  C)  Pulse:  [] 77  Resp:  [19-33] 19  BP: (128)/(81) 128/81  MAP:  [74 mmHg] 74 mmHg  Arterial Line BP: (110)/(52) 110/52  SpO2:  [93 %-100 %] 100 %    Intake/Output Summary (Last 24 hours) at 10/2/2023 2350  Last data filed at 10/2/2023 2259  Gross per 24 hour   Intake 3540 ml   Output 550 ml   Net 2990 ml     Wt Readings from Last 4 Encounters:   10/02/23 91.2 kg (201 lb)   05/01/23 101 kg (222 lb 11.2 oz)   03/14/23 103.9 kg (229 lb)   11/15/22 103.9 kg (229 lb)     Arterial Line BP: (110)/(52) 110/52  MAP:  [74 mmHg] 74 mmHg  BP - Mean:  [98] 98  Resp: 19    Recent Labs   Lab 10/02/23  2100   PH 7.33*   PCO2 39   PO2 119*   HCO3 21*       GEN: no acute distress   HEENT: head ncat, sclera anicteric, OP patent, trachea midline.  Very large goiter covering her whole anterior neck  PULM: unlabored synchronous with vent, clear anteriorly    CV/COR: RRR S1S2 no gallop,  No rub, no murmur  ABD: soft nontender, hypoactive bowel sounds, no mass  EXT: Peripheral edema  NEURO: Sedated  SKIN: I did not uncover her wound packing but there is significant odor coming from the debrided wound.  LINES: clean, dry intact         Data:   All data and imaging reviewed     ROUTINE ICU LABS (Last four results)  CMP  Recent Labs   Lab 10/02/23  2015 10/02/23  1837 10/02/23  1825   NA  --   --  128*   POTASSIUM  --   --  3.6   CHLORIDE  --   --  92*   CO2  --   --  18*   ANIONGAP  --   --  18*   *  --  199*   BUN  --    --  48.9*   CR  --  0.9 0.84   GFRESTIMATED  --  >60 78   JW  --   --  9.5   PROTTOTAL  --   --  6.5   ALBUMIN  --   --  2.2*   BILITOTAL  --   --  0.7   ALKPHOS  --   --  171*   AST  --   --  25   ALT  --   --  42     CBC  Recent Labs   Lab 10/02/23  1825   WBC 18.0*   RBC 5.01   HGB 16.0*   HCT 46.0   MCV 92   MCH 31.9   MCHC 34.8   RDW 13.3        INR  Recent Labs   Lab 10/02/23  1825   INR 1.26*     Arterial Blood Gas  Recent Labs   Lab 10/02/23  2100   PH 7.33*   PCO2 39   PO2 119*   HCO3 21*       All cultures:  No results for input(s): CULT in the last 168 hours.  Recent Results (from the past 24 hour(s))   CT Chest Pulmonary Embolism w Contrast    Narrative    EXAM: CT CHEST PULMONARY EMBOLISM W CONTRAST  LOCATION: [facility]  DATE: 10/2/2023    INDICATION: shortness of breath  COMPARISON: 06/25/2020  TECHNIQUE: CT chest pulmonary angiogram during arterial phase injection of IV contrast. Multiplanar reformats and MIP reconstructions were performed. Dose reduction techniques were used.   CONTRAST: 90ml isovue 370    FINDINGS:  ANGIOGRAM CHEST: Pulmonary arteries are normal caliber and negative for pulmonary emboli. Thoracic aorta is negative for dissection.    LUNGS AND PLEURA: Patchy airspace consolidation and groundglass attenuation has developed in the inferior lingular segment of the left upper lobe. Mild dependent atelectasis bilaterally. Stable mild extrinsic compression of the trachea by the goiter.    MEDIASTINUM/AXILLAE: Severely enlarged goiter. Calcified aortic valve leaflets.    CORONARY ARTERY CALCIFICATION: Moderate calcified atherosclerosis left anterior descending and right coronary arteries.    UPPER ABDOMEN: Diffuse bilateral adrenal enlargement.    MUSCULOSKELETAL: Mild degenerative change thoracic spine.      Impression    IMPRESSION:  1.  Findings suspicious for left upper lobe pneumonia  2.  Negative for PE   CT Abdomen Pelvis w Contrast   Result Value     Radiologist flags Necrotizing fasciitis (AA)    Narrative    EXAM: CT ABDOMEN PELVIS W CONTRAST  LOCATION: [facility]  DATE: 10/2/2023    INDICATION: groin infection concerning for necrotizing infection  COMPARISON: Same day CTA chest.  TECHNIQUE: CT scan of the abdomen and pelvis was performed following injection of IV contrast. Multiplanar reformats were obtained. Dose reduction techniques were used.  CONTRAST: 90ml isovue 370    FINDINGS:   LOWER CHEST: Mild left basilar atelectasis and/or scarring. Aortic valvular and coronary artery calcifications.    HEPATOBILIARY: Hepatomegaly and hepatic steatosis.    PANCREAS: Normal.    SPLEEN: Normal.    ADRENAL GLANDS: Unremarkable right adrenal gland. Thickening of the left adrenal gland without a discrete nodule.    KIDNEYS/BLADDER: Right renal hypodensity is too small to characterize. No dedicated imaging follow-up is required for this. No hydronephrosis. Mildly distended urinary bladder is grossly unremarkable.    BOWEL: No bowel obstruction. Colonic diverticulosis. No ascites or pneumoperitoneum.    LYMPH NODES: Prominent asymmetric left inguinal lymph nodes, for example lymph node measuring 2.3 x 1.2 cm (series 2, image 78), likely reactive.    VASCULATURE: Normal caliber abdominal aorta with extensive calcified aortoiliac atherosclerotic plaque.    PELVIC ORGANS: 1.7 cm left adnexal cyst (series 2, image 61). No dedicated imaging follow-up is required for this given size.    MUSCULOSKELETAL: Asymmetric skin thickening and subcutaneous stranding in the visualized left lower extremity, perineum, and groin. There is moderate associated subcutaneous and subfascial/intermuscular gas. No drainable fluid collection. Thoracolumbar   spondylosis. No destructive osseous lesion.      Impression    IMPRESSION:   1.  Findings compatible with clinically suspected necrotizing fasciitis of the left groin, perineum, and visualized left lower extremity.  No drainable fluid collection to suggest abscess.  2.  Hepatomegaly and hepatic steatosis.    [Critical Result: Necrotizing fasciitis]    Finding was identified on 10/2/2023 7:33 PM CDT.     Dr. Victor was contacted by me on 10/2/2023 7:44 PM CDT and verbalized understanding of the critical result.          Billing: This patient is critically ill: Yes. Total critical care time today 40 min exclusive of procedures or teaching.  Aging septic shock, respiratory failure, necrotizing fasciitis       81

## 2024-09-09 ENCOUNTER — APPOINTMENT (OUTPATIENT)
Dept: SURGERY | Facility: CLINIC | Age: 54
End: 2024-09-09

## 2024-09-16 ENCOUNTER — APPOINTMENT (OUTPATIENT)
Dept: OTOLARYNGOLOGY | Facility: CLINIC | Age: 54
End: 2024-09-16

## 2024-09-18 ENCOUNTER — APPOINTMENT (OUTPATIENT)
Dept: PULMONOLOGY | Facility: CLINIC | Age: 54
End: 2024-09-18
Payer: MEDICARE

## 2024-09-18 VITALS
WEIGHT: 175 LBS | DIASTOLIC BLOOD PRESSURE: 80 MMHG | BODY MASS INDEX: 32.2 KG/M2 | HEIGHT: 62 IN | SYSTOLIC BLOOD PRESSURE: 120 MMHG | OXYGEN SATURATION: 96 % | HEART RATE: 90 BPM

## 2024-09-18 DIAGNOSIS — G47.19 OTHER HYPERSOMNIA: ICD-10-CM

## 2024-09-18 DIAGNOSIS — G47.33 OBSTRUCTIVE SLEEP APNEA (ADULT) (PEDIATRIC): ICD-10-CM

## 2024-09-18 DIAGNOSIS — J44.9 CHRONIC OBSTRUCTIVE PULMONARY DISEASE, UNSPECIFIED: ICD-10-CM

## 2024-09-18 PROCEDURE — 99213 OFFICE O/P EST LOW 20 MIN: CPT

## 2024-09-18 PROCEDURE — G2211 COMPLEX E/M VISIT ADD ON: CPT

## 2024-09-18 NOTE — HISTORY OF PRESENT ILLNESS
[TextBox_4] : COPD ON TRELEGY  SP ADMSSION X4 TO HOSPITAL FOR PLEURAL/ PERICARDIAL EFFUSION/ ON CELLCEPT/ COLCHICINE SP CARDIAC STENT SOB ON EXERTION ON LOVENOX SP HST/ EDS

## 2024-09-18 NOTE — DISCUSSION/SUMMARY
[FreeTextEntry1] : COPD STABLE ON TRELEGY RECURRENT ADMISSION RENEW TRELEGY LUCY/ HST MILD EDS APS ON LOVENOX CHEST CT HEMO/ VASCULAR REVIEWED

## 2024-09-18 NOTE — REASON FOR VISIT
[Follow-Up Visit] : a follow-up visit for [Spouse] : spouse [FreeTextEntry2] : Antiphospholipid syndrome, SLE Resident

## 2024-10-30 DIAGNOSIS — Z78.0 ASYMPTOMATIC MENOPAUSAL STATE: ICD-10-CM

## 2024-10-30 DIAGNOSIS — Z12.31 ENCOUNTER FOR SCREENING MAMMOGRAM FOR MALIGNANT NEOPLASM OF BREAST: ICD-10-CM

## 2024-10-31 ENCOUNTER — APPOINTMENT (OUTPATIENT)
Dept: SURGERY | Facility: CLINIC | Age: 54
End: 2024-10-31

## 2024-11-04 ENCOUNTER — APPOINTMENT (OUTPATIENT)
Dept: OTOLARYNGOLOGY | Facility: CLINIC | Age: 54
End: 2024-11-04

## 2024-11-06 ENCOUNTER — APPOINTMENT (OUTPATIENT)
Dept: PULMONOLOGY | Facility: HOSPITAL | Age: 54
End: 2024-11-06

## 2024-12-04 NOTE — DIETITIAN INITIAL EVALUATION ADULT. - WEIGHT CHANGE
Received call from pt regarding her venous duplex scheduled on 12/6.  She states she has been in a wheelchair and is unable to straighten her legs and has pain.  She is asking if something could be prescribed to help with this for test.  Advised pt I would send a message to provider to review, unsure if this test can be done without patient being able to straighten her legs.   no

## 2024-12-09 ENCOUNTER — APPOINTMENT (OUTPATIENT)
Dept: PULMONOLOGY | Facility: HOSPITAL | Age: 54
End: 2024-12-09
Payer: MEDICARE

## 2024-12-09 ENCOUNTER — OUTPATIENT (OUTPATIENT)
Dept: OUTPATIENT SERVICES | Facility: HOSPITAL | Age: 54
LOS: 1 days | End: 2024-12-09
Payer: MEDICARE

## 2024-12-09 ENCOUNTER — APPOINTMENT (OUTPATIENT)
Dept: RHEUMATOLOGY | Facility: CLINIC | Age: 54
End: 2024-12-09
Payer: MEDICARE

## 2024-12-09 VITALS
BODY MASS INDEX: 31.1 KG/M2 | HEART RATE: 82 BPM | DIASTOLIC BLOOD PRESSURE: 88 MMHG | WEIGHT: 169 LBS | SYSTOLIC BLOOD PRESSURE: 161 MMHG | HEIGHT: 62 IN | OXYGEN SATURATION: 95 %

## 2024-12-09 DIAGNOSIS — Z98.890 OTHER SPECIFIED POSTPROCEDURAL STATES: Chronic | ICD-10-CM

## 2024-12-09 DIAGNOSIS — Z90.49 ACQUIRED ABSENCE OF OTHER SPECIFIED PARTS OF DIGESTIVE TRACT: Chronic | ICD-10-CM

## 2024-12-09 DIAGNOSIS — Z95.810 PRESENCE OF AUTOMATIC (IMPLANTABLE) CARDIAC DEFIBRILLATOR: Chronic | ICD-10-CM

## 2024-12-09 DIAGNOSIS — R06.02 SHORTNESS OF BREATH: ICD-10-CM

## 2024-12-09 DIAGNOSIS — I77.1 STRICTURE OF ARTERY: Chronic | ICD-10-CM

## 2024-12-09 DIAGNOSIS — M81.0 AGE-RELATED OSTEOPOROSIS W/OUT CURRENT PATHOLOGICAL FRACTURE: ICD-10-CM

## 2024-12-09 DIAGNOSIS — Z90.710 ACQUIRED ABSENCE OF BOTH CERVIX AND UTERUS: Chronic | ICD-10-CM

## 2024-12-09 PROCEDURE — 99214 OFFICE O/P EST MOD 30 MIN: CPT

## 2024-12-09 PROCEDURE — 94729 DIFFUSING CAPACITY: CPT | Mod: 26

## 2024-12-09 PROCEDURE — 94070 EVALUATION OF WHEEZING: CPT

## 2024-12-09 PROCEDURE — 94664 DEMO&/EVAL PT USE INHALER: CPT

## 2024-12-09 PROCEDURE — 94060 EVALUATION OF WHEEZING: CPT | Mod: 26

## 2024-12-09 PROCEDURE — 94729 DIFFUSING CAPACITY: CPT

## 2024-12-09 PROCEDURE — 94200 LUNG FUNCTION TEST (MBC/MVV): CPT | Mod: XU

## 2024-12-09 PROCEDURE — 94726 PLETHYSMOGRAPHY LUNG VOLUMES: CPT

## 2024-12-09 PROCEDURE — 94727 GAS DIL/WSHOT DETER LNG VOL: CPT | Mod: 26

## 2024-12-09 RX ORDER — ALENDRONATE SODIUM 70 MG/1
70 TABLET ORAL
Qty: 12 | Refills: 3 | Status: ACTIVE | COMMUNITY
Start: 2024-12-09 | End: 1900-01-01

## 2024-12-09 RX ORDER — DULOXETINE HYDROCHLORIDE 30 MG/1
30 CAPSULE, DELAYED RELEASE PELLETS ORAL
Qty: 30 | Refills: 2 | Status: ACTIVE | COMMUNITY
Start: 2024-12-09 | End: 1900-01-01

## 2024-12-10 DIAGNOSIS — R06.02 SHORTNESS OF BREATH: ICD-10-CM

## 2024-12-19 ENCOUNTER — APPOINTMENT (OUTPATIENT)
Dept: SURGERY | Facility: CLINIC | Age: 54
End: 2024-12-19

## 2024-12-19 VITALS
HEART RATE: 115 BPM | WEIGHT: 166 LBS | BODY MASS INDEX: 31.75 KG/M2 | DIASTOLIC BLOOD PRESSURE: 86 MMHG | TEMPERATURE: 97 F | SYSTOLIC BLOOD PRESSURE: 142 MMHG | OXYGEN SATURATION: 97 % | HEIGHT: 60.5 IN

## 2024-12-19 DIAGNOSIS — R11.0 NAUSEA: ICD-10-CM

## 2024-12-19 PROCEDURE — G2211 COMPLEX E/M VISIT ADD ON: CPT | Mod: NC

## 2024-12-19 PROCEDURE — 99205 OFFICE O/P NEW HI 60 MIN: CPT

## 2024-12-19 RX ORDER — ONDANSETRON 8 MG/1
8 TABLET ORAL
Qty: 30 | Refills: 1 | Status: ACTIVE | COMMUNITY
Start: 2024-12-19 | End: 1900-01-01

## 2025-01-10 ENCOUNTER — APPOINTMENT (OUTPATIENT)
Dept: PULMONOLOGY | Facility: CLINIC | Age: 55
End: 2025-01-10
Payer: MEDICARE

## 2025-01-10 VITALS
DIASTOLIC BLOOD PRESSURE: 80 MMHG | HEART RATE: 101 BPM | HEIGHT: 60 IN | RESPIRATION RATE: 15 BRPM | OXYGEN SATURATION: 93 % | WEIGHT: 163 LBS | BODY MASS INDEX: 32 KG/M2 | SYSTOLIC BLOOD PRESSURE: 140 MMHG

## 2025-01-10 DIAGNOSIS — J44.9 CHRONIC OBSTRUCTIVE PULMONARY DISEASE, UNSPECIFIED: ICD-10-CM

## 2025-01-10 DIAGNOSIS — J15.9 UNSPECIFIED BACTERIAL PNEUMONIA: ICD-10-CM

## 2025-01-10 DIAGNOSIS — I82.509 CHRONIC EMBOLISM AND THROMBOSIS OF UNSPECIFIED DEEP VEINS OF UNSPECIFIED LOWER EXTREMITY: ICD-10-CM

## 2025-01-10 PROCEDURE — 99214 OFFICE O/P EST MOD 30 MIN: CPT | Mod: 25

## 2025-01-10 PROCEDURE — G2211 COMPLEX E/M VISIT ADD ON: CPT

## 2025-01-10 PROCEDURE — 71046 X-RAY EXAM CHEST 2 VIEWS: CPT

## 2025-01-10 RX ORDER — ALBUTEROL SULFATE 2.5 MG/3ML
(2.5 MG/3ML) SOLUTION RESPIRATORY (INHALATION) 4 TIMES DAILY
Qty: 5 | Refills: 3 | Status: ACTIVE | COMMUNITY
Start: 2025-01-10 | End: 1900-01-01

## 2025-01-10 RX ORDER — BUDESONIDE, GLYCOPYRROLATE, AND FORMOTEROL FUMARATE 160; 9; 4.8 UG/1; UG/1; UG/1
160-9-4.8 AEROSOL, METERED RESPIRATORY (INHALATION)
Qty: 1 | Refills: 5 | Status: ACTIVE | COMMUNITY
Start: 2025-01-10 | End: 1900-01-01

## 2025-01-10 RX ORDER — PREDNISONE 20 MG/1
20 TABLET ORAL
Qty: 18 | Refills: 0 | Status: ACTIVE | COMMUNITY
Start: 2025-01-10 | End: 1900-01-01

## 2025-01-13 ENCOUNTER — APPOINTMENT (OUTPATIENT)
Dept: OTOLARYNGOLOGY | Facility: CLINIC | Age: 55
End: 2025-01-13

## 2025-01-15 PROBLEM — J02.8 YEAST PHARYNGITIS: Status: RESOLVED | Noted: 2018-06-01 | Resolved: 2025-01-15

## 2025-01-15 PROBLEM — Z12.39 BREAST SCREENING: Status: RESOLVED | Noted: 2023-08-02 | Resolved: 2025-01-15

## 2025-01-15 PROBLEM — R51.9 HEADACHE, ACUTE: Status: RESOLVED | Noted: 2023-12-21 | Resolved: 2025-01-15

## 2025-01-15 PROBLEM — Z82.69 FAMILY HISTORY OF SYSTEMIC LUPUS ERYTHEMATOSUS: Status: ACTIVE | Noted: 2025-01-15

## 2025-01-15 PROBLEM — Z87.898 HISTORY OF BLOOD LOSS: Status: RESOLVED | Noted: 2023-02-17 | Resolved: 2025-01-15

## 2025-01-15 PROBLEM — Z95.0 PACEMAKER: Status: ACTIVE | Noted: 2025-01-15

## 2025-01-15 PROBLEM — Z86.79 HISTORY OF HYPERTENSION: Status: RESOLVED | Noted: 2017-06-26 | Resolved: 2025-01-15

## 2025-01-15 PROBLEM — Z92.89 HISTORY OF SCREENING MAMMOGRAPHY: Status: RESOLVED | Noted: 2024-10-30 | Resolved: 2025-01-15

## 2025-01-15 PROBLEM — Z85.41 HISTORY OF PRIMARY CERVICAL CANCER: Status: RESOLVED | Noted: 2025-01-15 | Resolved: 2025-01-15

## 2025-01-15 PROBLEM — Z79.899 ENCOUNTER FOR LONG-TERM CURRENT USE OF MEDICATION: Status: RESOLVED | Noted: 2022-08-02 | Resolved: 2025-01-15

## 2025-01-15 PROBLEM — Z87.898 HISTORY OF HOARSENESS: Status: RESOLVED | Noted: 2017-10-02 | Resolved: 2025-01-15

## 2025-01-15 PROBLEM — R04.0 EPISTAXIS, RECURRENT: Status: RESOLVED | Noted: 2019-12-17 | Resolved: 2025-01-15

## 2025-01-15 PROBLEM — Z85.21 HISTORY OF MALIGNANT NEOPLASM OF LARYNX: Status: RESOLVED | Noted: 2021-02-08 | Resolved: 2025-01-15

## 2025-01-15 PROBLEM — Z83.3 FAMILY HISTORY OF TYPE 2 DIABETES MELLITUS: Status: ACTIVE | Noted: 2025-01-15

## 2025-01-15 PROBLEM — I21.9 MYOCARDIAL INFARCT: Status: ACTIVE | Noted: 2025-01-15

## 2025-01-15 PROBLEM — Z87.898 HISTORY OF EPISTAXIS: Status: RESOLVED | Noted: 2019-02-22 | Resolved: 2025-01-15

## 2025-01-15 PROBLEM — Z80.0 FAMILY HISTORY OF MALIGNANT NEOPLASM OF STOMACH: Status: ACTIVE | Noted: 2025-01-15

## 2025-01-15 PROBLEM — K12.1 ORAL ULCER: Status: RESOLVED | Noted: 2017-10-23 | Resolved: 2025-01-15

## 2025-01-15 PROBLEM — Z01.419 ENCOUNTER FOR BREAST AND PELVIC EXAMINATION: Status: RESOLVED | Noted: 2023-08-02 | Resolved: 2025-01-15

## 2025-01-15 PROBLEM — Z82.49 FAMILY HISTORY OF CARDIAC DISORDER: Status: ACTIVE | Noted: 2025-01-15

## 2025-01-15 PROBLEM — Z79.01 CHRONIC ANTICOAGULATION: Status: ACTIVE | Noted: 2025-01-15

## 2025-01-15 PROBLEM — J38.3 LESION OF TRUE VOCAL CORD: Status: RESOLVED | Noted: 2017-10-02 | Resolved: 2025-01-15

## 2025-01-15 PROBLEM — J31.0 RHINITIS MEDICAMENTOSA: Status: RESOLVED | Noted: 2019-02-22 | Resolved: 2025-01-15

## 2025-01-17 PROBLEM — K59.09 CHRONIC CONSTIPATION: Status: ACTIVE | Noted: 2025-01-17

## 2025-01-17 PROBLEM — G47.00 INSOMNIA: Status: ACTIVE | Noted: 2025-01-17

## 2025-01-22 ENCOUNTER — APPOINTMENT (OUTPATIENT)
Dept: PULMONOLOGY | Facility: CLINIC | Age: 55
End: 2025-01-22
Payer: MEDICARE

## 2025-01-22 ENCOUNTER — RESULT REVIEW (OUTPATIENT)
Age: 55
End: 2025-01-22

## 2025-01-22 ENCOUNTER — OUTPATIENT (OUTPATIENT)
Dept: OUTPATIENT SERVICES | Facility: HOSPITAL | Age: 55
LOS: 1 days | End: 2025-01-22
Payer: MEDICARE

## 2025-01-22 VITALS
OXYGEN SATURATION: 95 % | SYSTOLIC BLOOD PRESSURE: 130 MMHG | DIASTOLIC BLOOD PRESSURE: 90 MMHG | HEIGHT: 62 IN | WEIGHT: 163 LBS | BODY MASS INDEX: 30 KG/M2 | HEART RATE: 104 BPM | RESPIRATION RATE: 16 BRPM

## 2025-01-22 DIAGNOSIS — Z98.890 OTHER SPECIFIED POSTPROCEDURAL STATES: Chronic | ICD-10-CM

## 2025-01-22 DIAGNOSIS — Z12.31 ENCOUNTER FOR SCREENING MAMMOGRAM FOR MALIGNANT NEOPLASM OF BREAST: ICD-10-CM

## 2025-01-22 DIAGNOSIS — I82.509 CHRONIC EMBOLISM AND THROMBOSIS OF UNSPECIFIED DEEP VEINS OF UNSPECIFIED LOWER EXTREMITY: ICD-10-CM

## 2025-01-22 DIAGNOSIS — Z13.820 ENCOUNTER FOR SCREENING FOR OSTEOPOROSIS: ICD-10-CM

## 2025-01-22 DIAGNOSIS — I77.1 STRICTURE OF ARTERY: Chronic | ICD-10-CM

## 2025-01-22 DIAGNOSIS — J45.909 UNSPECIFIED ASTHMA, UNCOMPLICATED: ICD-10-CM

## 2025-01-22 DIAGNOSIS — J15.9 UNSPECIFIED BACTERIAL PNEUMONIA: ICD-10-CM

## 2025-01-22 DIAGNOSIS — Z90.710 ACQUIRED ABSENCE OF BOTH CERVIX AND UTERUS: Chronic | ICD-10-CM

## 2025-01-22 DIAGNOSIS — Z95.810 PRESENCE OF AUTOMATIC (IMPLANTABLE) CARDIAC DEFIBRILLATOR: Chronic | ICD-10-CM

## 2025-01-22 DIAGNOSIS — Z90.49 ACQUIRED ABSENCE OF OTHER SPECIFIED PARTS OF DIGESTIVE TRACT: Chronic | ICD-10-CM

## 2025-01-22 DIAGNOSIS — J44.9 CHRONIC OBSTRUCTIVE PULMONARY DISEASE, UNSPECIFIED: ICD-10-CM

## 2025-01-22 PROCEDURE — 77067 SCR MAMMO BI INCL CAD: CPT

## 2025-01-22 PROCEDURE — 77063 BREAST TOMOSYNTHESIS BI: CPT

## 2025-01-22 PROCEDURE — 99214 OFFICE O/P EST MOD 30 MIN: CPT

## 2025-01-22 PROCEDURE — 77067 SCR MAMMO BI INCL CAD: CPT | Mod: 26

## 2025-01-22 PROCEDURE — 77080 DXA BONE DENSITY AXIAL: CPT | Mod: 26

## 2025-01-22 PROCEDURE — 77080 DXA BONE DENSITY AXIAL: CPT

## 2025-01-22 PROCEDURE — G2211 COMPLEX E/M VISIT ADD ON: CPT

## 2025-01-22 PROCEDURE — 77063 BREAST TOMOSYNTHESIS BI: CPT | Mod: 26

## 2025-01-22 RX ORDER — PREDNISONE 20 MG/1
20 TABLET ORAL
Qty: 15 | Refills: 0 | Status: ACTIVE | COMMUNITY
Start: 2025-01-22 | End: 1900-01-01

## 2025-01-22 RX ORDER — BUDESONIDE AND FORMOTEROL FUMARATE DIHYDRATE 160; 4.5 UG/1; UG/1
160-4.5 AEROSOL RESPIRATORY (INHALATION) TWICE DAILY
Qty: 1 | Refills: 3 | Status: ACTIVE | COMMUNITY
Start: 2025-01-22 | End: 1900-01-01

## 2025-01-23 ENCOUNTER — OUTPATIENT (OUTPATIENT)
Dept: OUTPATIENT SERVICES | Facility: HOSPITAL | Age: 55
LOS: 1 days | End: 2025-01-23
Payer: MEDICARE

## 2025-01-23 ENCOUNTER — RESULT REVIEW (OUTPATIENT)
Age: 55
End: 2025-01-23

## 2025-01-23 DIAGNOSIS — I77.1 STRICTURE OF ARTERY: Chronic | ICD-10-CM

## 2025-01-23 DIAGNOSIS — R05.9 COUGH, UNSPECIFIED: ICD-10-CM

## 2025-01-23 DIAGNOSIS — Z98.890 OTHER SPECIFIED POSTPROCEDURAL STATES: Chronic | ICD-10-CM

## 2025-01-23 DIAGNOSIS — Z12.31 ENCOUNTER FOR SCREENING MAMMOGRAM FOR MALIGNANT NEOPLASM OF BREAST: ICD-10-CM

## 2025-01-23 DIAGNOSIS — Z00.8 ENCOUNTER FOR OTHER GENERAL EXAMINATION: ICD-10-CM

## 2025-01-23 DIAGNOSIS — Z95.810 PRESENCE OF AUTOMATIC (IMPLANTABLE) CARDIAC DEFIBRILLATOR: Chronic | ICD-10-CM

## 2025-01-23 DIAGNOSIS — Z13.820 ENCOUNTER FOR SCREENING FOR OSTEOPOROSIS: ICD-10-CM

## 2025-01-23 DIAGNOSIS — Z90.49 ACQUIRED ABSENCE OF OTHER SPECIFIED PARTS OF DIGESTIVE TRACT: Chronic | ICD-10-CM

## 2025-01-23 DIAGNOSIS — Z90.710 ACQUIRED ABSENCE OF BOTH CERVIX AND UTERUS: Chronic | ICD-10-CM

## 2025-01-23 PROCEDURE — 71250 CT THORAX DX C-: CPT | Mod: 26

## 2025-01-23 PROCEDURE — 71250 CT THORAX DX C-: CPT

## 2025-01-24 DIAGNOSIS — R05.9 COUGH, UNSPECIFIED: ICD-10-CM

## 2025-02-03 ENCOUNTER — APPOINTMENT (OUTPATIENT)
Age: 55
End: 2025-02-03

## 2025-02-04 RX ORDER — LEVOFLOXACIN 750 MG/1
TABLET, FILM COATED ORAL
Refills: 0 | Status: ACTIVE | COMMUNITY

## 2025-02-04 RX ORDER — LEVOFLOXACIN 500 MG/1
500 TABLET, FILM COATED ORAL DAILY
Qty: 10 | Refills: 0 | Status: ACTIVE | COMMUNITY
Start: 2025-02-04 | End: 1900-01-01

## 2025-02-10 ENCOUNTER — INPATIENT (INPATIENT)
Facility: HOSPITAL | Age: 55
LOS: 2 days | Discharge: HOME | DRG: 864 | End: 2025-02-13
Attending: HOSPITALIST | Admitting: INTERNAL MEDICINE
Payer: MEDICARE

## 2025-02-10 ENCOUNTER — APPOINTMENT (OUTPATIENT)
Dept: SURGERY | Facility: CLINIC | Age: 55
End: 2025-02-10

## 2025-02-10 VITALS
DIASTOLIC BLOOD PRESSURE: 74 MMHG | HEART RATE: 111 BPM | TEMPERATURE: 98 F | RESPIRATION RATE: 18 BRPM | OXYGEN SATURATION: 93 % | SYSTOLIC BLOOD PRESSURE: 125 MMHG

## 2025-02-10 DIAGNOSIS — Z98.890 OTHER SPECIFIED POSTPROCEDURAL STATES: Chronic | ICD-10-CM

## 2025-02-10 DIAGNOSIS — Z90.710 ACQUIRED ABSENCE OF BOTH CERVIX AND UTERUS: Chronic | ICD-10-CM

## 2025-02-10 DIAGNOSIS — Z90.49 ACQUIRED ABSENCE OF OTHER SPECIFIED PARTS OF DIGESTIVE TRACT: Chronic | ICD-10-CM

## 2025-02-10 DIAGNOSIS — I77.1 STRICTURE OF ARTERY: Chronic | ICD-10-CM

## 2025-02-10 DIAGNOSIS — R50.9 FEVER, UNSPECIFIED: ICD-10-CM

## 2025-02-10 DIAGNOSIS — Z95.810 PRESENCE OF AUTOMATIC (IMPLANTABLE) CARDIAC DEFIBRILLATOR: Chronic | ICD-10-CM

## 2025-02-10 LAB
ALBUMIN SERPL ELPH-MCNC: 4.2 G/DL — SIGNIFICANT CHANGE UP (ref 3.5–5.2)
ALP SERPL-CCNC: 68 U/L — SIGNIFICANT CHANGE UP (ref 30–115)
ALT FLD-CCNC: 8 U/L — SIGNIFICANT CHANGE UP (ref 0–41)
ANION GAP SERPL CALC-SCNC: 15 MMOL/L — HIGH (ref 7–14)
APPEARANCE UR: ABNORMAL
APTT BLD: 51.8 SEC — HIGH (ref 27–39.2)
AST SERPL-CCNC: 13 U/L — SIGNIFICANT CHANGE UP (ref 0–41)
BACTERIA # UR AUTO: ABNORMAL /HPF
BASE EXCESS BLDV CALC-SCNC: 5.6 MMOL/L — HIGH (ref -2–3)
BASOPHILS # BLD AUTO: 0.03 K/UL — SIGNIFICANT CHANGE UP (ref 0–0.2)
BASOPHILS NFR BLD AUTO: 0.6 % — SIGNIFICANT CHANGE UP (ref 0–1)
BILIRUB SERPL-MCNC: 0.3 MG/DL — SIGNIFICANT CHANGE UP (ref 0.2–1.2)
BILIRUB UR-MCNC: NEGATIVE — SIGNIFICANT CHANGE UP
BUN SERPL-MCNC: 12 MG/DL — SIGNIFICANT CHANGE UP (ref 10–20)
CA-I SERPL-SCNC: 1.17 MMOL/L — SIGNIFICANT CHANGE UP (ref 1.15–1.33)
CALCIUM SERPL-MCNC: 8.9 MG/DL — SIGNIFICANT CHANGE UP (ref 8.4–10.5)
CAST: 2 /LPF — SIGNIFICANT CHANGE UP (ref 0–4)
CHLORIDE SERPL-SCNC: 95 MMOL/L — LOW (ref 98–110)
CO2 SERPL-SCNC: 27 MMOL/L — SIGNIFICANT CHANGE UP (ref 17–32)
COLOR SPEC: YELLOW — SIGNIFICANT CHANGE UP
CREAT SERPL-MCNC: 1 MG/DL — SIGNIFICANT CHANGE UP (ref 0.7–1.5)
DIFF PNL FLD: ABNORMAL
EGFR: 67 ML/MIN/1.73M2 — SIGNIFICANT CHANGE UP
EOSINOPHIL # BLD AUTO: 0.01 K/UL — SIGNIFICANT CHANGE UP (ref 0–0.7)
EOSINOPHIL NFR BLD AUTO: 0.2 % — SIGNIFICANT CHANGE UP (ref 0–8)
FLUAV AG NPH QL: SIGNIFICANT CHANGE UP
FLUBV AG NPH QL: SIGNIFICANT CHANGE UP
GAS PNL BLDV: 131 MMOL/L — LOW (ref 136–145)
GAS PNL BLDV: SIGNIFICANT CHANGE UP
GAS PNL BLDV: SIGNIFICANT CHANGE UP
GLUCOSE SERPL-MCNC: 110 MG/DL — HIGH (ref 70–99)
GLUCOSE UR QL: NEGATIVE MG/DL — SIGNIFICANT CHANGE UP
HCO3 BLDV-SCNC: 32 MMOL/L — HIGH (ref 22–29)
HCT VFR BLD CALC: 43.8 % — SIGNIFICANT CHANGE UP (ref 37–47)
HCT VFR BLDA CALC: 44 % — SIGNIFICANT CHANGE UP (ref 34.5–46.5)
HGB BLD CALC-MCNC: 14.8 G/DL — SIGNIFICANT CHANGE UP (ref 11.7–16.1)
HGB BLD-MCNC: 14.5 G/DL — SIGNIFICANT CHANGE UP (ref 12–16)
IMM GRANULOCYTES NFR BLD AUTO: 1 % — HIGH (ref 0.1–0.3)
INR BLD: 1.02 RATIO — SIGNIFICANT CHANGE UP (ref 0.65–1.3)
KETONES UR-MCNC: NEGATIVE MG/DL — SIGNIFICANT CHANGE UP
LACTATE BLDV-MCNC: 1.2 MMOL/L — SIGNIFICANT CHANGE UP (ref 0.5–2)
LACTATE SERPL-SCNC: 1.2 MMOL/L — SIGNIFICANT CHANGE UP (ref 0.7–2)
LEUKOCYTE ESTERASE UR-ACNC: ABNORMAL
LYMPHOCYTES # BLD AUTO: 1.43 K/UL — SIGNIFICANT CHANGE UP (ref 1.2–3.4)
LYMPHOCYTES # BLD AUTO: 28.9 % — SIGNIFICANT CHANGE UP (ref 20.5–51.1)
MCHC RBC-ENTMCNC: 28.5 PG — SIGNIFICANT CHANGE UP (ref 27–31)
MCHC RBC-ENTMCNC: 33.1 G/DL — SIGNIFICANT CHANGE UP (ref 32–37)
MCV RBC AUTO: 86.1 FL — SIGNIFICANT CHANGE UP (ref 81–99)
MONOCYTES # BLD AUTO: 0.57 K/UL — SIGNIFICANT CHANGE UP (ref 0.1–0.6)
MONOCYTES NFR BLD AUTO: 11.5 % — HIGH (ref 1.7–9.3)
NEUTROPHILS # BLD AUTO: 2.85 K/UL — SIGNIFICANT CHANGE UP (ref 1.4–6.5)
NEUTROPHILS NFR BLD AUTO: 57.8 % — SIGNIFICANT CHANGE UP (ref 42.2–75.2)
NITRITE UR-MCNC: NEGATIVE — SIGNIFICANT CHANGE UP
NRBC # BLD: 0 /100 WBCS — SIGNIFICANT CHANGE UP (ref 0–0)
NRBC BLD-RTO: 0 /100 WBCS — SIGNIFICANT CHANGE UP (ref 0–0)
NT-PROBNP SERPL-SCNC: 118 PG/ML — SIGNIFICANT CHANGE UP (ref 0–300)
PCO2 BLDV: 53 MMHG — HIGH (ref 39–42)
PH BLDV: 7.39 — SIGNIFICANT CHANGE UP (ref 7.32–7.43)
PH UR: 6.5 — SIGNIFICANT CHANGE UP (ref 5–8)
PLATELET # BLD AUTO: 131 K/UL — SIGNIFICANT CHANGE UP (ref 130–400)
PMV BLD: 12.3 FL — HIGH (ref 7.4–10.4)
PO2 BLDV: 37 MMHG — SIGNIFICANT CHANGE UP (ref 25–45)
POTASSIUM BLDV-SCNC: 3.7 MMOL/L — SIGNIFICANT CHANGE UP (ref 3.5–5.1)
POTASSIUM SERPL-MCNC: 4.1 MMOL/L — SIGNIFICANT CHANGE UP (ref 3.5–5)
POTASSIUM SERPL-SCNC: 4.1 MMOL/L — SIGNIFICANT CHANGE UP (ref 3.5–5)
PROT SERPL-MCNC: 6.6 G/DL — SIGNIFICANT CHANGE UP (ref 6–8)
PROT UR-MCNC: SIGNIFICANT CHANGE UP MG/DL
PROTHROM AB SERPL-ACNC: 12 SEC — SIGNIFICANT CHANGE UP (ref 9.95–12.87)
RBC # BLD: 5.09 M/UL — SIGNIFICANT CHANGE UP (ref 4.2–5.4)
RBC # FLD: 13.9 % — SIGNIFICANT CHANGE UP (ref 11.5–14.5)
RBC CASTS # UR COMP ASSIST: 2 /HPF — SIGNIFICANT CHANGE UP (ref 0–4)
RSV RNA NPH QL NAA+NON-PROBE: SIGNIFICANT CHANGE UP
SAO2 % BLDV: 68.2 % — SIGNIFICANT CHANGE UP (ref 67–88)
SARS-COV-2 RNA SPEC QL NAA+PROBE: SIGNIFICANT CHANGE UP
SODIUM SERPL-SCNC: 137 MMOL/L — SIGNIFICANT CHANGE UP (ref 135–146)
SP GR SPEC: 1.01 — SIGNIFICANT CHANGE UP (ref 1–1.03)
SQUAMOUS # UR AUTO: 10 /HPF — HIGH (ref 0–5)
TROPONIN T, HIGH SENSITIVITY RESULT: 8 NG/L — SIGNIFICANT CHANGE UP (ref 6–13)
UROBILINOGEN FLD QL: 1 MG/DL — SIGNIFICANT CHANGE UP (ref 0.2–1)
WBC # BLD: 4.94 K/UL — SIGNIFICANT CHANGE UP (ref 4.8–10.8)
WBC # FLD AUTO: 4.94 K/UL — SIGNIFICANT CHANGE UP (ref 4.8–10.8)
WBC UR QL: 2 /HPF — SIGNIFICANT CHANGE UP (ref 0–5)

## 2025-02-10 PROCEDURE — 84145 PROCALCITONIN (PCT): CPT

## 2025-02-10 PROCEDURE — 86225 DNA ANTIBODY NATIVE: CPT

## 2025-02-10 PROCEDURE — 94640 AIRWAY INHALATION TREATMENT: CPT

## 2025-02-10 PROCEDURE — 85652 RBC SED RATE AUTOMATED: CPT

## 2025-02-10 PROCEDURE — 86140 C-REACTIVE PROTEIN: CPT

## 2025-02-10 PROCEDURE — 71045 X-RAY EXAM CHEST 1 VIEW: CPT | Mod: 26

## 2025-02-10 PROCEDURE — 93010 ELECTROCARDIOGRAM REPORT: CPT

## 2025-02-10 PROCEDURE — 83735 ASSAY OF MAGNESIUM: CPT

## 2025-02-10 PROCEDURE — 87040 BLOOD CULTURE FOR BACTERIA: CPT

## 2025-02-10 PROCEDURE — 82570 ASSAY OF URINE CREATININE: CPT

## 2025-02-10 PROCEDURE — 85025 COMPLETE CBC W/AUTO DIFF WBC: CPT

## 2025-02-10 PROCEDURE — 86160 COMPLEMENT ANTIGEN: CPT

## 2025-02-10 PROCEDURE — 93308 TTE F-UP OR LMTD: CPT | Mod: 26

## 2025-02-10 PROCEDURE — 86431 RHEUMATOID FACTOR QUANT: CPT

## 2025-02-10 PROCEDURE — 86235 NUCLEAR ANTIGEN ANTIBODY: CPT

## 2025-02-10 PROCEDURE — 80053 COMPREHEN METABOLIC PANEL: CPT

## 2025-02-10 PROCEDURE — 93306 TTE W/DOPPLER COMPLETE: CPT

## 2025-02-10 PROCEDURE — 99285 EMERGENCY DEPT VISIT HI MDM: CPT | Mod: GC

## 2025-02-10 PROCEDURE — 86200 CCP ANTIBODY: CPT

## 2025-02-10 PROCEDURE — 36415 COLL VENOUS BLD VENIPUNCTURE: CPT

## 2025-02-10 PROCEDURE — 81001 URINALYSIS AUTO W/SCOPE: CPT

## 2025-02-10 PROCEDURE — 93308 TTE F-UP OR LMTD: CPT

## 2025-02-10 PROCEDURE — 84156 ASSAY OF PROTEIN URINE: CPT

## 2025-02-10 PROCEDURE — 99222 1ST HOSP IP/OBS MODERATE 55: CPT

## 2025-02-10 PROCEDURE — 86038 ANTINUCLEAR ANTIBODIES: CPT

## 2025-02-10 RX ORDER — IPRATROPIUM BROMIDE AND ALBUTEROL SULFATE .5; 2.5 MG/3ML; MG/3ML
3 SOLUTION RESPIRATORY (INHALATION) ONCE
Refills: 0 | Status: DISCONTINUED | OUTPATIENT
Start: 2025-02-10 | End: 2025-02-13

## 2025-02-10 RX ORDER — VANCOMYCIN HYDROCHLORIDE 50 MG/ML
1000 KIT ORAL ONCE
Refills: 0 | Status: COMPLETED | OUTPATIENT
Start: 2025-02-10 | End: 2025-02-10

## 2025-02-10 RX ORDER — BACTERIOSTATIC SODIUM CHLORIDE 0.9 %
1000 VIAL (ML) INJECTION ONCE
Refills: 0 | Status: COMPLETED | OUTPATIENT
Start: 2025-02-10 | End: 2025-02-10

## 2025-02-10 RX ORDER — CEFEPIME HCL 1 G
2000 IV SOLUTION, PIGGYBACK, BOTTLE (EA) INTRAVENOUS ONCE
Refills: 0 | Status: COMPLETED | OUTPATIENT
Start: 2025-02-10 | End: 2025-02-10

## 2025-02-10 RX ADMIN — Medication 100 MILLIGRAM(S): at 18:53

## 2025-02-10 RX ADMIN — Medication 1000 MILLILITER(S): at 20:20

## 2025-02-10 RX ADMIN — VANCOMYCIN HYDROCHLORIDE 250 MILLIGRAM(S): KIT at 20:20

## 2025-02-10 NOTE — ED PROVIDER NOTE - PHYSICAL EXAMINATION
CONSTITUTIONAL: Chronically ill appearing  SKIN: Warm, dry  HEAD: NCAT  EYES: Clear conjunctiva   ENT: MMdry  NECK: Supple  CARD: RRR, S1, S2; no M/R/G  RESP: Normal respiratory effort, CTAB  ABD: Soft, nontender. No rebound, rigidity, or guarding  EXT: Pulses palpable distally  NEURO: Grossly intact. Awake, alert, moving all extremities, no facial asymmetry.

## 2025-02-10 NOTE — ED PROVIDER NOTE - CONSIDERATION OF ADMISSION OBSERVATION
Consideration of Admission/Observation Patient with fever of unknown origin, failure to thrive, will require admission

## 2025-02-10 NOTE — ED PROVIDER NOTE - OBJECTIVE STATEMENT
54yoF with extensive PMHx, including multiple autoimmune  HTN, HLD, RA, SLE/triple (+) APLS, on Lovenox, pericarditis,  cervical CA s/p hysterectomy, cholecystectomy, laryngeal CA s/p chemo/rad, presenting for persistent fever, fatigue and sob for the past 1 month. Pt had a recent admission for RSV, pneumonia and pericarditis, was discharged home with abx, but has continued to have intermittent fever. Over the past week, the fever has become persistent, despite taking levofloxacin daily. She also reports new urinary hesitancy and urgency over the past few days. She took tylenol immediately before coming to the ED, but no other new medications Denies nausea, vomiting, diarrhea, chest pain, rashes, 54yoF with extensive PMHx, including multiple autoimmune  HTN, HLD, RA, SLE/triple (+) APLS, on Lovenox, pericarditis,  cervical CA s/p hysterectomy, cholecystectomy, laryngeal CA s/p chemo/rad, presenting for persistent fever, fatigue and sob for the past 1 month. Pt has been treated at home for RSV and pneumonia by Dr. Dumont over the past month with oral antibiotics but has continued to have intermittent fever. Over the past week, the fever has become persistent, despite taking levofloxacin daily. She also reports new urinary hesitancy and urgency over the past few days. She took tylenol immediately before coming to the ED, but no other new medications Denies nausea, vomiting, diarrhea, chest pain, rashes,

## 2025-02-10 NOTE — PATIENT PROFILE ADULT - FALL HARM RISK - RISK INTERVENTIONS
Assistance OOB with selected safe patient handling equipment/Communicate Fall Risk and Risk Factors to all staff, patient, and family/Reinforce activity limits and safety measures with patient and family/Review medications for side effects contributing to fall risk/Sit up slowly, dangle for a short time, stand at bedside before walking/Toileting schedule using arm’s reach rule for commode and bathroom/Visual Cue: Yellow wristband/Bed in lowest position, wheels locked, appropriate side rails in place/Call bell, personal items and telephone in reach/Instruct patient to call for assistance before getting out of bed or chair/Non-slip footwear when patient is out of bed/[place] to call system/Physically safe environment - no spills, clutter or unnecessary equipment/Purposeful Proactive Rounding/Room/bathroom lighting operational, light cord in reach

## 2025-02-10 NOTE — ED ADULT TRIAGE NOTE - CHIEF COMPLAINT QUOTE
fever 104, not going down despite meds recently diagnosed with b/l PNA and RSV 1 month ago. wears 2L NC at home hx of COPD.

## 2025-02-10 NOTE — PATIENT PROFILE ADULT - FUNCTIONAL ASSESSMENT - BASIC MOBILITY 6.
4-calculated by average/Not able to assess (calculate score using [facility] averaging method)

## 2025-02-10 NOTE — ED PROVIDER NOTE - DIFFERENTIAL DIAGNOSIS
Differential Diagnosis Fever, dyspnea, search for source r/o PNA vs other underlying metabolic derangement.

## 2025-02-10 NOTE — PATIENT PROFILE ADULT - FUNCTIONAL ASSESSMENT - BASIC MOBILITY 3.
Final Anesthesia Post-op Assessment    Patient: Tarah Man  Procedure(s) Performed: CATARACT EXTRACTION WITH INTRAOCULAR LENS IMPLANTATION/ LEFT EYE     ZCBOO 22.00 GMY 8/28    Anesthesia type: Monitor Anesthesia Care    Vital Last Value   Temperature 36.1 °C (96.9 °F) (09/26/17 0900)   Pulse 70 (09/26/17 0907)   Respiratory Rate 16 (09/26/17 0907)   Non-Invasive   Blood Pressure 133/69 (09/26/17 0907)   Arterial  Blood Pressure     Pulse Oximetry 93 % (09/26/17 0907)     Last 24 I/O:   Intake/Output Summary (Last 24 hours) at 09/26/17 0938  Last data filed at 09/26/17 0858   Gross per 24 hour   Intake              400 ml   Output                0 ml   Net              400 ml       PATIENT LOCATION: Phase II  POST-OP VITAL SIGNS: stable  LEVEL OF CONSCIOUSNESS: participates in exam, answers questions appropriately, awake, alert and oriented  RESPIRATORY STATUS: spontaneous ventilation  CARDIOVASCULAR: blood pressure returned to baseline and stable  HYDRATION: euvolemic    PAIN MANAGEMENT: adequately controlled  NAUSEA: None  AIRWAY PATENCY: patent  POST-OP ASSESSMENT: no complications, patient tolerated procedure well with no complications and sufficiently recovered from acute administration of anesthesia effects and able to participate in evaluation  COMPLICATIONS: none      
no
4 = No assist / stand by assistance

## 2025-02-10 NOTE — ED PROVIDER NOTE - PROGRESS NOTE DETAILS
ROBER-- bedside ultrasound shows trace pericardial effusion, good EF, collapsible IVC, no right strain

## 2025-02-10 NOTE — ED PROVIDER NOTE - CLINICAL SUMMARY MEDICAL DECISION MAKING FREE TEXT BOX
Patient presented with fever, fatigue and dyspnea x 1 month as documented, on outpatient abx without improvement. Failure to thrive at home. On arrival to ED patient (+) tachycardic but afebrile, HD stable. EKG obtained and non-specific but no evidence of STEMI. Obtained labs which were grossly unremarkable including no significant leukocytosis, anemia, signs of dehydration/TIAN, transaminitis or significant electrolyte abnormalities. Trop negative. Chest xray negative for pneumothorax, pneumonia, widened mediastinum, evidence of rib fractures, enlarged cardiac silhouette or any other emergent pathologies. UA negative for infection. Patient remained stable during ED course but too weak for discharge, and therefore will require admission for further IV hydration and work up/management. HD stable at time of admission.

## 2025-02-10 NOTE — ED PROVIDER NOTE - WR ORDER ID 1
May take tylenol starting at 4 pm today as needed for pain.  Max daily dose 3000 mg/24 hours.   Avoid strenuous activities until cleared by Doctor.  JANIYA drainage twice daily and record amount on provided paper.
[id number]

## 2025-02-11 LAB
ALBUMIN SERPL ELPH-MCNC: 3.3 G/DL — LOW (ref 3.5–5.2)
ALP SERPL-CCNC: 58 U/L — SIGNIFICANT CHANGE UP (ref 30–115)
ALT FLD-CCNC: <5 U/L — SIGNIFICANT CHANGE UP (ref 0–41)
ANION GAP SERPL CALC-SCNC: 13 MMOL/L — SIGNIFICANT CHANGE UP (ref 7–14)
APPEARANCE UR: CLEAR — SIGNIFICANT CHANGE UP
AST SERPL-CCNC: 12 U/L — SIGNIFICANT CHANGE UP (ref 0–41)
BASOPHILS # BLD AUTO: 0.02 K/UL — SIGNIFICANT CHANGE UP (ref 0–0.2)
BASOPHILS NFR BLD AUTO: 0.4 % — SIGNIFICANT CHANGE UP (ref 0–1)
BILIRUB SERPL-MCNC: 0.3 MG/DL — SIGNIFICANT CHANGE UP (ref 0.2–1.2)
BILIRUB UR-MCNC: NEGATIVE — SIGNIFICANT CHANGE UP
BUN SERPL-MCNC: 8 MG/DL — LOW (ref 10–20)
CALCIUM SERPL-MCNC: 8.5 MG/DL — SIGNIFICANT CHANGE UP (ref 8.4–10.4)
CHLORIDE SERPL-SCNC: 102 MMOL/L — SIGNIFICANT CHANGE UP (ref 98–110)
CO2 SERPL-SCNC: 23 MMOL/L — SIGNIFICANT CHANGE UP (ref 17–32)
COLOR SPEC: YELLOW — SIGNIFICANT CHANGE UP
CREAT SERPL-MCNC: 0.8 MG/DL — SIGNIFICANT CHANGE UP (ref 0.7–1.5)
DIFF PNL FLD: ABNORMAL
EGFR: 88 ML/MIN/1.73M2 — SIGNIFICANT CHANGE UP
EOSINOPHIL # BLD AUTO: 0.05 K/UL — SIGNIFICANT CHANGE UP (ref 0–0.7)
EOSINOPHIL NFR BLD AUTO: 1.1 % — SIGNIFICANT CHANGE UP (ref 0–8)
GLUCOSE SERPL-MCNC: 101 MG/DL — HIGH (ref 70–99)
GLUCOSE UR QL: NEGATIVE MG/DL — SIGNIFICANT CHANGE UP
HCT VFR BLD CALC: 37.3 % — SIGNIFICANT CHANGE UP (ref 37–47)
HGB BLD-MCNC: 12.2 G/DL — SIGNIFICANT CHANGE UP (ref 12–16)
IMM GRANULOCYTES NFR BLD AUTO: 0.6 % — HIGH (ref 0.1–0.3)
KETONES UR-MCNC: ABNORMAL MG/DL
LEUKOCYTE ESTERASE UR-ACNC: NEGATIVE — SIGNIFICANT CHANGE UP
LYMPHOCYTES # BLD AUTO: 2.12 K/UL — SIGNIFICANT CHANGE UP (ref 1.2–3.4)
LYMPHOCYTES # BLD AUTO: 45.1 % — SIGNIFICANT CHANGE UP (ref 20.5–51.1)
MAGNESIUM SERPL-MCNC: 1.6 MG/DL — LOW (ref 1.8–2.4)
MCHC RBC-ENTMCNC: 28.5 PG — SIGNIFICANT CHANGE UP (ref 27–31)
MCHC RBC-ENTMCNC: 32.7 G/DL — SIGNIFICANT CHANGE UP (ref 32–37)
MCV RBC AUTO: 87.1 FL — SIGNIFICANT CHANGE UP (ref 81–99)
MONOCYTES # BLD AUTO: 0.45 K/UL — SIGNIFICANT CHANGE UP (ref 0.1–0.6)
MONOCYTES NFR BLD AUTO: 9.6 % — HIGH (ref 1.7–9.3)
NEUTROPHILS # BLD AUTO: 2.03 K/UL — SIGNIFICANT CHANGE UP (ref 1.4–6.5)
NEUTROPHILS NFR BLD AUTO: 43.2 % — SIGNIFICANT CHANGE UP (ref 42.2–75.2)
NITRITE UR-MCNC: NEGATIVE — SIGNIFICANT CHANGE UP
NRBC BLD AUTO-RTO: 0 /100 WBCS — SIGNIFICANT CHANGE UP (ref 0–0)
PH UR: 6 — SIGNIFICANT CHANGE UP (ref 5–8)
PLATELET # BLD AUTO: 125 K/UL — LOW (ref 130–400)
PMV BLD: 12.5 FL — HIGH (ref 7.4–10.4)
POTASSIUM SERPL-MCNC: 4.1 MMOL/L — SIGNIFICANT CHANGE UP (ref 3.5–5)
POTASSIUM SERPL-SCNC: 4.1 MMOL/L — SIGNIFICANT CHANGE UP (ref 3.5–5)
PROT SERPL-MCNC: 5.6 G/DL — LOW (ref 6–8)
PROT UR-MCNC: SIGNIFICANT CHANGE UP MG/DL
RBC # BLD: 4.28 M/UL — SIGNIFICANT CHANGE UP (ref 4.2–5.4)
RBC # FLD: 13.9 % — SIGNIFICANT CHANGE UP (ref 11.5–14.5)
SODIUM SERPL-SCNC: 138 MMOL/L — SIGNIFICANT CHANGE UP (ref 135–146)
SP GR SPEC: 1.02 — SIGNIFICANT CHANGE UP (ref 1–1.03)
UROBILINOGEN FLD QL: 1 MG/DL — SIGNIFICANT CHANGE UP (ref 0.2–1)
WBC # BLD: 4.7 K/UL — LOW (ref 4.8–10.8)
WBC # FLD AUTO: 4.7 K/UL — LOW (ref 4.8–10.8)

## 2025-02-11 PROCEDURE — 99232 SBSQ HOSP IP/OBS MODERATE 35: CPT

## 2025-02-11 RX ORDER — ALBUTEROL 90 MCG
2 AEROSOL REFILL (GRAM) INHALATION EVERY 6 HOURS
Refills: 0 | Status: DISCONTINUED | OUTPATIENT
Start: 2025-02-11 | End: 2025-02-13

## 2025-02-11 RX ORDER — SODIUM CHLORIDE 9 G/ML
1000 INJECTION, SOLUTION INTRAVENOUS ONCE
Refills: 0 | Status: COMPLETED | OUTPATIENT
Start: 2025-02-11 | End: 2025-02-11

## 2025-02-11 RX ORDER — COLCHICINE 0.6 MG/1
0.6 TABLET ORAL
Refills: 0 | Status: DISCONTINUED | OUTPATIENT
Start: 2025-02-11 | End: 2025-02-13

## 2025-02-11 RX ORDER — VANCOMYCIN HYDROCHLORIDE 50 MG/ML
1000 KIT ORAL EVERY 24 HOURS
Refills: 0 | Status: DISCONTINUED | OUTPATIENT
Start: 2025-02-11 | End: 2025-02-11

## 2025-02-11 RX ORDER — FOLIC ACID 1 MG
1 TABLET ORAL DAILY
Refills: 0 | Status: DISCONTINUED | OUTPATIENT
Start: 2025-02-11 | End: 2025-02-13

## 2025-02-11 RX ORDER — SENNOSIDES 8.6 MG
2 TABLET ORAL AT BEDTIME
Refills: 0 | Status: DISCONTINUED | OUTPATIENT
Start: 2025-02-11 | End: 2025-02-13

## 2025-02-11 RX ORDER — ALPRAZOLAM 2 MG
1 TABLET ORAL EVERY 8 HOURS
Refills: 0 | Status: DISCONTINUED | OUTPATIENT
Start: 2025-02-11 | End: 2025-02-13

## 2025-02-11 RX ORDER — IPRATROPIUM BROMIDE AND ALBUTEROL SULFATE .5; 2.5 MG/3ML; MG/3ML
3 SOLUTION RESPIRATORY (INHALATION) EVERY 6 HOURS
Refills: 0 | Status: DISCONTINUED | OUTPATIENT
Start: 2025-02-11 | End: 2025-02-13

## 2025-02-11 RX ORDER — METOCLOPRAMIDE 10 MG/1
1 TABLET ORAL
Refills: 0 | DISCHARGE

## 2025-02-11 RX ORDER — MYCOPHENOLATE MOFETIL 200 MG/ML
500 POWDER, FOR SUSPENSION ORAL
Refills: 0 | Status: DISCONTINUED | OUTPATIENT
Start: 2025-02-11 | End: 2025-02-13

## 2025-02-11 RX ORDER — MORPHINE SULFATE 60 MG/1
30 TABLET, FILM COATED, EXTENDED RELEASE ORAL
Refills: 0 | Status: DISCONTINUED | OUTPATIENT
Start: 2025-02-11 | End: 2025-02-11

## 2025-02-11 RX ORDER — ENOXAPARIN SODIUM 100 MG/ML
120 INJECTION SUBCUTANEOUS EVERY 24 HOURS
Refills: 0 | Status: DISCONTINUED | OUTPATIENT
Start: 2025-02-11 | End: 2025-02-13

## 2025-02-11 RX ORDER — POLYETHYLENE GLYCOL 3350 17 G/17G
17 POWDER, FOR SOLUTION ORAL DAILY
Refills: 0 | Status: DISCONTINUED | OUTPATIENT
Start: 2025-02-11 | End: 2025-02-13

## 2025-02-11 RX ORDER — MAGNESIUM SULFATE 0.8 MEQ/ML
2 AMPUL (ML) INJECTION ONCE
Refills: 0 | Status: COMPLETED | OUTPATIENT
Start: 2025-02-11 | End: 2025-02-11

## 2025-02-11 RX ORDER — MORPHINE SULFATE 60 MG/1
1 TABLET, FILM COATED, EXTENDED RELEASE ORAL
Refills: 0 | DISCHARGE

## 2025-02-11 RX ORDER — ALENDRONATE SODIUM 35 MG/1
1 TABLET ORAL
Refills: 0 | DISCHARGE

## 2025-02-11 RX ORDER — METOPROLOL SUCCINATE 25 MG
100 TABLET, EXTENDED RELEASE 24 HR ORAL DAILY
Refills: 0 | Status: DISCONTINUED | OUTPATIENT
Start: 2025-02-11 | End: 2025-02-13

## 2025-02-11 RX ORDER — MORPHINE SULFATE 60 MG/1
30 TABLET, FILM COATED, EXTENDED RELEASE ORAL
Refills: 0 | Status: DISCONTINUED | OUTPATIENT
Start: 2025-02-11 | End: 2025-02-13

## 2025-02-11 RX ORDER — METOCLOPRAMIDE 10 MG/1
5 TABLET ORAL DAILY
Refills: 0 | Status: DISCONTINUED | OUTPATIENT
Start: 2025-02-11 | End: 2025-02-13

## 2025-02-11 RX ORDER — MEROPENEM 500 MG/20ML
1000 INJECTION INTRAVENOUS EVERY 8 HOURS
Refills: 0 | Status: DISCONTINUED | OUTPATIENT
Start: 2025-02-11 | End: 2025-02-11

## 2025-02-11 RX ADMIN — MORPHINE SULFATE 30 MILLIGRAM(S): 60 TABLET, FILM COATED, EXTENDED RELEASE ORAL at 23:25

## 2025-02-11 RX ADMIN — Medication 2000 UNIT(S): at 11:05

## 2025-02-11 RX ADMIN — IPRATROPIUM BROMIDE AND ALBUTEROL SULFATE 3 MILLILITER(S): .5; 2.5 SOLUTION RESPIRATORY (INHALATION) at 08:06

## 2025-02-11 RX ADMIN — Medication 25 GRAM(S): at 11:05

## 2025-02-11 RX ADMIN — MEROPENEM 100 MILLIGRAM(S): 500 INJECTION INTRAVENOUS at 06:18

## 2025-02-11 RX ADMIN — MYCOPHENOLATE MOFETIL 500 MILLIGRAM(S): 200 POWDER, FOR SUSPENSION ORAL at 17:37

## 2025-02-11 RX ADMIN — MORPHINE SULFATE 30 MILLIGRAM(S): 60 TABLET, FILM COATED, EXTENDED RELEASE ORAL at 06:19

## 2025-02-11 RX ADMIN — COLCHICINE 0.6 MILLIGRAM(S): 0.6 TABLET ORAL at 06:20

## 2025-02-11 RX ADMIN — POLYETHYLENE GLYCOL 3350 17 GRAM(S): 17 POWDER, FOR SOLUTION ORAL at 11:06

## 2025-02-11 RX ADMIN — MORPHINE SULFATE 30 MILLIGRAM(S): 60 TABLET, FILM COATED, EXTENDED RELEASE ORAL at 06:45

## 2025-02-11 RX ADMIN — MORPHINE SULFATE 30 MILLIGRAM(S): 60 TABLET, FILM COATED, EXTENDED RELEASE ORAL at 17:37

## 2025-02-11 RX ADMIN — SODIUM CHLORIDE 1000 MILLILITER(S): 9 INJECTION, SOLUTION INTRAVENOUS at 06:18

## 2025-02-11 RX ADMIN — ENOXAPARIN SODIUM 120 MILLIGRAM(S): 100 INJECTION SUBCUTANEOUS at 03:00

## 2025-02-11 RX ADMIN — VANCOMYCIN HYDROCHLORIDE 250 MILLIGRAM(S): KIT at 06:18

## 2025-02-11 RX ADMIN — Medication 1 MILLIGRAM(S): at 11:05

## 2025-02-11 RX ADMIN — Medication 1 MILLIGRAM(S): at 22:53

## 2025-02-11 RX ADMIN — MORPHINE SULFATE 30 MILLIGRAM(S): 60 TABLET, FILM COATED, EXTENDED RELEASE ORAL at 22:53

## 2025-02-11 RX ADMIN — MYCOPHENOLATE MOFETIL 500 MILLIGRAM(S): 200 POWDER, FOR SUSPENSION ORAL at 06:20

## 2025-02-11 RX ADMIN — Medication 1 MILLIGRAM(S): at 11:16

## 2025-02-11 RX ADMIN — MORPHINE SULFATE 30 MILLIGRAM(S): 60 TABLET, FILM COATED, EXTENDED RELEASE ORAL at 11:17

## 2025-02-11 RX ADMIN — COLCHICINE 0.6 MILLIGRAM(S): 0.6 TABLET ORAL at 17:37

## 2025-02-11 NOTE — H&P ADULT - NSHPLABSRESULTS_GEN_ALL_CORE
.                          14.5   4.94  )-----------( 131      ( 10 Feb 2025 17:43 )             43.8     02-10    137  |  95[L]  |  12  ----------------------------<  110[H]  4.1   |  27  |  1.0    Ca    8.9      10 Feb 2025 17:43    TPro  6.6  /  Alb  4.2  /  TBili  0.3  /  DBili  x   /  AST  13  /  ALT  8   /  AlkPhos  68  02-10    PT/INR - ( 10 Feb 2025 17:43 )   PT: 12.00 sec;   INR: 1.02 ratio         PTT - ( 10 Feb 2025 17:43 )  PTT:51.8 sec      Troponin T, High Sensitivity Result: 8 ng/L (02-10-25 @ 17:43)  Pro-Brain Natriuretic Peptide: 118 pg/mL (02-10-25 @ 17:43)      VBG - ( 10 Feb 2025 17:43 )  pH: 7.39  /  pCO2: 53    /  pO2: 37    / HCO3: 32    / Base Excess: 1.2   /  SaO2: 68.2  /  Lactate: 1.2          POCT glucose:   Urinalysis Basic - ( 10 Feb 2025 19:44 )    Color: Yellow / Appearance: Cloudy / S.011 / pH: x  Gluc: x / Ketone: Negative mg/dL  / Bili: Negative / Urobili: 1.0 mg/dL   Blood: x / Protein: Trace mg/dL / Nitrite: Negative   Leuk Esterase: Trace / RBC: 2 /HPF / WBC 2 /HPF   Sq Epi: x / Non Sq Epi: 10 /HPF / Bacteria: Few /HPF        RADIOLOGY & ADDITIONAL STUDIES:    < from: Xray Chest 1 View-PORTABLE IMMEDIATE (02.10.25 @ 17:06) >    No radiographic evidence of acute cardiopulmonary disease.    < end of copied text >

## 2025-02-11 NOTE — CONSULT NOTE ADULT - ASSESSMENT
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 Pt has been treated at home for RSV and pneumonia (completed courses of both Levaquin and Doxycycline) by urgent care, PCP, and by Dr. Dumont over the past month but has continued to have intermittent fever.     IMPRESSION  #    Afebrile     No leukocytosis Admission WBC 4    UA without significant pyuria     CXR: No radiographic evidence of acute cardiopulmonary disease.    CT 1/23- Several bilateral pulmonary nodules measuring up to a 3 mm subpleural nodule within right lower lobe. Many of these nodules are without significant change with some that are new possibly focus of mucus plugging. Atelectasis  #RA, SLE/triple (+) APLS   #AICD, PPM  #Hx cervical/laryngeal ca  #COPD home O2  #Immunodeficiency secondary to above comorbidities which could result in poor clinical outcome   #Abx allergy:   Avelox (Other)- cardiac rhythm issues  Vantin (Other (Mild))- swelling?    Creatinine: 1.0 (02-10-25 @ 17:43)    Height (cm): 157.5 (06-26-24 @ 18:44)  Weight (kg): 72.6 (02-10-25 @ 17:52)    RECOMMENDATIONS  This is an incomplete consult note. All final recommendations to follow after interview and examination of the patient. Please follow recommendations noted below.    If any questions, please send a message or call on [email or web address] Teams  Please continue to update ID with any pertinent new laboratory, radiographic findings, or change in clinical status       ASSESSMENT  54 year old F with with extensive PMHx, including HTN, HLD, HFpEF (EF 65% June 2024), RA, 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 Pt has been treated at home for RSV and pneumonia (completed courses of both Levaquin and Doxycycline) by urgent care, PCP, and by Dr. Dumont over the past month but has continued to have intermittent fever.     IMPRESSION  #Fevers at home    Afebrile     No leukocytosis Admission WBC 4    UA without significant pyuria     CXR: No radiographic evidence of acute cardiopulmonary disease.    CT 1/23- Several bilateral pulmonary nodules measuring up to a 3 mm subpleural nodule within right lower lobe. Many of these nodules are without significant change with some that are new possibly focus of mucus plugging. Atelectasis  #RA, SLE/triple (+) APLS on cellcept   #AICD, PPM  #Hx cervical/laryngeal ca  #COPD home O2  #Immunodeficiency secondary to above comorbidities which could result in poor clinical outcome   #Abx allergy:   Avelox (Other)- cardiac rhythm issues  Vantin (Other (Mild))- swelling?    Creatinine: 1.0 (02-10-25 @ 17:43)    Height (cm): 157.5 (06-26-24 @ 18:44)  Weight (kg): 72.6 (02-10-25 @ 17:52)    RECOMMENDATIONS  - f/u BCX  - ESR CRP  - Rheum  - > 1 mo of fever more concerning for Rheumatologic etiology than infectious. Hx extensive infectious workup  - Monitor off antimicrobials as no clear source of infection, would not want to mask anything  - TTE    If any questions, please send a message or call on [employer] Teams  Please continue to update ID with any pertinent new laboratory, radiographic findings, or change in clinical status

## 2025-02-11 NOTE — CONSULT NOTE ADULT - SUBJECTIVE AND OBJECTIVE BOX
ILEANA MCNEIL  54y, Female  Allergy: Arava (Anaphylaxis; Angioedema)  Plaquenil (Other)  Vantin (Other (Mild))  adhesives (Rash; Urticaria)  Avelox (Other)      CHIEF COMPLAINT:       LOS  1d    HPI  HPI:  54 year old F with with extensive PMHx, including HTN, HLD, HFpEF (EF 65% 2024), RA, 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 Pt has been treated at home for RSV and pneumonia (completed courses of both Levaquin and Doxycycline) by urgent care, PCP, and by Dr. Dumont over the past month but has continued to have intermittent fever. Over the past week, the fever has become persistent, stating it is often as high as 104. She took tylenol immediately before coming to the ED, but no other new medications Denies nausea, vomiting, diarrhea, chest pain, rashes.     Vitals in ED: T 98.5, , /74, RR 19, Sat 93% on RA --> improved to 96% on 2L NC  Labs in ED: unremarkable. UA with trace LE but otherwise neg. RVP neg.   Imaging:  CXR: No radiographic evidence of acute cardiopulmonary disease.    s/p cefepime 2g , Vanc 1 g, and 1L NS bolus in ED. Admitted to medicine for further workup for fevers of unknown origin.   (2025 03:08)      INFECTIOUS DISEASE HISTORY:  ID consulted for fever  Recently dx with RSV, PNA s/p outpatient antibiotics     Previous hospitalizations 2024, 2024 for Recurrent Fever + Pericarditis/tamponade & Pleural effusions. It was unclear if Coxsackie B related, titers positive, possible crossreactivity of serotypes-Patient had repeat titers that DECREASED. Seroconversion or an increase in titers between acute and convalescent sera of at least fourfold is considered strong evidence of current or recent infection.  Extensive ID workup performed and was negative except for as above     BCX NGTD      RVP (-)    s/p thoracentesis  WBC 1798; 51% NP, 39% lymph. CX NG     s/p pericardiocentesis  pericardial fluid NG ; WBC 2654; 51% NP, AFB (-)    Parvovirus (-), CMV IgM (-), EBV (-), Histo (-) , HIV (-), fungitell (-), adenovirus (-), Toxo (-), legionella (-), strep urine (-) , quantiferon indeterminate    YOON (-)    TSH wnl         Currently ordered for:  meropenem  IVPB 1000 milliGRAM(s) IV Intermittent every 8 hours  vancomycin  IVPB 1000 milliGRAM(s) IV Intermittent every 24 hours      PMH  PAST MEDICAL & SURGICAL HISTORY:  Lupus      RA (rheumatoid arthritis)      HTN (hypertension)      CHF (congestive heart failure)      COPD (chronic obstructive pulmonary disease)      DVT (deep venous thrombosis)      Pulmonary embolism      History of laryngeal cancer      GERD (gastroesophageal reflux disease)      Cervical cancer      APS (antiphospholipid syndrome)      S/P appendectomy      S/P cholecystectomy      AICD (automatic cardioverter/defibrillator) present  [employer]      S/P hysterectomy      History of back surgery      H/O foot surgery      S/P eye surgery      Subclavian arterial stenosis          FAMILY HISTORY  Family history of cervical cancer    FH: thyroid cancer        SOCIAL HISTORY  Social History:  lives w  and multiple other family members (2024 22:53)        ROS  ***    VITALS:  T(F): 98.4, Max: 98.5 (02-10-25 @ 15:35)  HR: 77  BP: 115/75  RR: 18Vital Signs Last 24 Hrs  T(C): 36.9 (2025 07:21), Max: 36.9 (10 Feb 2025 15:35)  T(F): 98.4 (2025 07:21), Max: 98.5 (10 Feb 2025 15:35)  HR: 77 (2025 07:21) (74 - 111)  BP: 115/75 (2025 07:21) (93/56 - 132/78)  BP(mean): 68 (2025 05:29) (68 - 68)  RR: 18 (2025 07:21) (18 - 18)  SpO2: 97% (2025 07:21) (93% - 97%)    Parameters below as of 2025 07:21  Patient On (Oxygen Delivery Method): nasal cannula        PHYSICAL EXAM:  ***    TESTS & MEASUREMENTS:                        14.5   4.94  )-----------( 131      ( 10 Feb 2025 17:43 )             43.8     0210    137  |  95[L]  |  12  ----------------------------<  110[H]  4.1   |  27  |  1.0    Ca    8.9      10 Feb 2025 17:43    TPro  6.6  /  Alb  4.2  /  TBili  0.3  /  DBili  x   /  AST  13  /  ALT  8   /  AlkPhos  68  0210      LIVER FUNCTIONS - ( 10 Feb 2025 17:43 )  Alb: 4.2 g/dL / Pro: 6.6 g/dL / ALK PHOS: 68 U/L / ALT: 8 U/L / AST: 13 U/L / GGT: x           Urinalysis Basic - ( 10 Feb 2025 19:44 )    Color: Yellow / Appearance: Cloudy / S.011 / pH: x  Gluc: x / Ketone: Negative mg/dL  / Bili: Negative / Urobili: 1.0 mg/dL   Blood: x / Protein: Trace mg/dL / Nitrite: Negative   Leuk Esterase: Trace / RBC: 2 /HPF / WBC 2 /HPF   Sq Epi: x / Non Sq Epi: 10 /HPF / Bacteria: Few /HPF        Urinalysis with Rflx Culture (collected 02-10-25 @ 19:44)    Urinalysis with Rflx Culture (collected 24 @ 17:06)    Culture - Blood (collected 24 @ 15:30)  Source: .Blood Blood-Peripheral  Final Report (24 @ 23:00):    No growth at 5 days    Culture - Blood (collected 24 @ 15:30)  Source: .Blood Blood-Peripheral  Final Report (24 @ 23:00):    No growth at 5 days    Culture - Blood (collected 24 @ 04:43)  Source: .Blood None  Final Report (24 @ 17:01):    No growth at 5 days    Culture - Blood (collected 24 @ 16:41)  Source: .Blood None  Final Report (24 @ 01:00):    No growth at 5 days    Culture - Blood (collected 24 @ 11:40)  Source: .Blood None  Final Report (24 @ 22:00):    No growth at 5 days    Culture - Blood (collected 24 @ 05:00)  Source: .Blood None  Final Report (24 @ 14:00):    No growth at 5 days    Culture - Fungal, Body Fluid (collected 24 @ 13:54)  Source: Pleural Fl Pleural Fluid  Final Report (24 @ 23:00):    No fungus isolated at 4 weeks.    Culture - Body Fluid with Gram Stain (collected 24 @ 13:54)  Source: Pleural Fl Pleural Fluid  Gram Stain (24 @ 03:18):    polymorphonuclear leukocytes seen    No organisms seen    by cytocentrifuge  Final Report (24 @ 18:31):    No growth at 5 days    Culture - Blood (collected 24 @ 02:13)  Source: .Blood Blood-Peripheral  Final Report (24 @ 10:01):    No growth at 5 days    Culture - Viral, General (collected 24 @ 17:00)    Culture - Fungal, Body Fluid (collected 24 @ 17:00)  Source: .Body Fluid Other, Fluid  Final Report (24 @ 23:01):    No fungus isolated at 4 weeks.    Culture - Acid Fast - Body Fluid w/Smear (collected 24 @ 17:00)  Source: Pericardial Other, Pericardial Fluid  Final Report (24 @ 23:03):    No acid-fast bacilli isolated after 6 weeks.    Culture - Body Fluid with Gram Stain (collected 24 @ 17:00)  Source: Pericardial Other, Pericardial Fluid  Gram Stain (24 @ 02:51):    polymorphonuclear leukocytes seen    No organisms seen    by cytocentrifuge  Final Report (24 @ 16:37):    No growth at 5 days    Culture - Blood (collected 24 @ 16:11)  Source: .Blood Blood  Gram Stain (24 @ 19:04):    Growth in anaerobic bottle: Gram Positive Cocci in Clusters  Final Report (24 @ 15:03):    Growth in anaerobic bottle: Staphylococcus hominis    Isolation of Coagulase negative Staphylococcus from single blood culture    sets may represent    contamination. Contact the Microbiology Department at [phone] if    susceptibility testing is    clinically indicated.    Direct identification is available within approximately 3-5    hours either by Blood Panel Multiplexed PCR or Direct    MALDI-TOF. Details: [email or web address]  Organism: Blood Culture PCR (24 @ 15:03)  Organism: Blood Culture PCR (24 @ 15:03)      Method Type: PCR      -  Coagulase negative Staphylococcus: Detec    Culture - Blood (collected 24 @ 16:11)  Source: .Blood Blood  Final Report (24 @ 23:00):    No growth at 5 days    Culture - Urine (collected 24 @ 15:29)  Source: Clean Catch Clean Catch (Midstream)  Final Report (24 @ 09:29):    No growth    Culture - Stool (collected 24 @ 17:06)  Source: .Stool Feces  Final Report (24 @ 17:57):    No enteric pathogens isolated.    (Stool culture examined for Salmonella,    Shigella, Campylobacter, Aeromonas, Plesiomonas,    Vibrio, E.coli O157 and Yersinia)    Culture - Blood (collected 24 @ 19:59)  Source: .Blood Blood  Final Report (24 @ 07:00):    No growth at 5 days    Culture - Blood (collected 24 @ 19:59)  Source: .Blood Blood  Final Report (24 @ 07:00):    No growth at 5 days        Lactate, Blood: 1.2 mmol/L (02-10-25 @ 17:43)  Blood Gas Venous - Lactate: 1.2 mmol/L (02-10-25 @ 17:43)      INFECTIOUS DISEASES TESTING  MRSA PCR Result.: Negative (24 @ 10:20)  Streptococcus pneumoniae Ag, Ur Result: Negative (24 @ 10:20)  Legionella Antigen, Urine: Negative (24 @ 10:20)  Procalcitonin: 0.09 ng/mL (24 @ 08:29)  MRSA PCR Result.: Negative (24 @ 08:45)  Procalcitonin: 0.14 ng/mL (24 @ 16:41)  Fungitell: <31 pg/mL (24 @ 16:41)  Procalcitonin: 0.25 ng/mL (24 @ 15:53)  Rapid RVP Result: NotDetec (24 @ 07:12)  HIV-1/2 Combo Result: Nonreact (24 @ 11:50)  Hepatitis B Surface Antigen: Nonreact (24 @ 11:50)  MRSA PCR Result.: Negative (24 @ 23:15)  Legionella Antigen, Urine: Negative (24 @ 16:53)  Streptococcus pneumoniae Ag, Ur Result: Negative (24 @ 16:53)  Procalcitonin: 0.06 ng/mL (24 @ 06:31)      INFLAMMATORY MARKERS      RADIOLOGY & ADDITIONAL TESTS:  I have personally reviewed the last Chest xray  CXR      CT      CARDIOLOGY TESTING      MEDICATIONS  albuterol/ipratropium for Nebulization 3 Nebulizer every 6 hours  albuterol/ipratropium for Nebulization. 3 Nebulizer once  cholecalciferol 2000 Oral daily  colchicine 0.6 Oral two times a day  enoxaparin Injectable 120 SubCutaneous every 24 hours  folic acid 1 Oral daily  meropenem  IVPB 1000 IV Intermittent every 8 hours  metoprolol succinate  Oral daily  mycophenolate mofetil 500 Oral two times a day  polyethylene glycol 3350 17 Oral daily  senna 2 Oral at bedtime  vancomycin  IVPB 1000 IV Intermittent every 24 hours        ANTIBIOTICS:  meropenem  IVPB 1000 milliGRAM(s) IV Intermittent every 8 hours  vancomycin  IVPB 1000 milliGRAM(s) IV Intermittent every 24 hours      ALLERGIES:  Arava (Anaphylaxis; Angioedema)  Plaquenil (Other)  Vantin (Other (Mild))  adhesives (Rash; Urticaria)  Avelox (Other)       ILEANA MCNEIL  54y, Female  Allergy: Arava (Anaphylaxis; Angioedema)  Plaquenil (Other)  Vantin (Other (Mild))  adhesives (Rash; Urticaria)  Avelox (Other)      CHIEF COMPLAINT:       LOS  1d    HPI  HPI:  54 year old F with with extensive PMHx, including HTN, HLD, HFpEF (EF 65% 2024), RA, 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 Pt has been treated at home for RSV and pneumonia (completed courses of both Levaquin and Doxycycline) by urgent care, PCP, and by Dr. Dumont over the past month but has continued to have intermittent fever. Over the past week, the fever has become persistent, stating it is often as high as 104. She took tylenol immediately before coming to the ED, but no other new medications Denies nausea, vomiting, diarrhea, chest pain, rashes.     Vitals in ED: T 98.5, , /74, RR 19, Sat 93% on RA --> improved to 96% on 2L NC  Labs in ED: unremarkable. UA with trace LE but otherwise neg. RVP neg.   Imaging:  CXR: No radiographic evidence of acute cardiopulmonary disease.    s/p cefepime 2g , Vanc 1 g, and 1L NS bolus in ED. Admitted to medicine for further workup for fevers of unknown origin.   (2025 03:08)      INFECTIOUS DISEASE HISTORY:  ID consulted for fever  Recently dx with RSV, PNA s/p outpatient antibiotics   Reports fevers since 1/3, arthralgias  Non-productive cough , SOB  No travel, no pets, no sick contacts  on cellcept, on steroids since 1/3 on and off     Previous hospitalizations 2024, 2024 for Recurrent Fever + Pericarditis/tamponade & Pleural effusions. It was unclear if Coxsackie B related, titers positive, possible crossreactivity of serotypes-Patient had repeat titers that DECREASED. Seroconversion or an increase in titers between acute and convalescent sera of at least fourfold is considered strong evidence of current or recent infection.  Extensive ID workup performed and was negative except for as above     BCX NGTD      RVP (-)    s/p thoracentesis  WBC 1798; 51% NP, 39% lymph. CX NG     s/p pericardiocentesis  pericardial fluid NG ; WBC 2654; 51% NP, AFB (-)    Parvovirus (-), CMV IgM (-), EBV (-), Histo (-) , HIV (-), fungitell (-), adenovirus (-), Toxo (-), legionella (-), strep urine (-) , quantiferon indeterminate    YOON (-)    TSH wnl         Currently ordered for:  meropenem  IVPB 1000 milliGRAM(s) IV Intermittent every 8 hours  vancomycin  IVPB 1000 milliGRAM(s) IV Intermittent every 24 hours      PMH  PAST MEDICAL & SURGICAL HISTORY:  Lupus      RA (rheumatoid arthritis)      HTN (hypertension)      CHF (congestive heart failure)      COPD (chronic obstructive pulmonary disease)      DVT (deep venous thrombosis)      Pulmonary embolism      History of laryngeal cancer      GERD (gastroesophageal reflux disease)      Cervical cancer      APS (antiphospholipid syndrome)      S/P appendectomy      S/P cholecystectomy      AICD (automatic cardioverter/defibrillator) present  [employer]tr[employer]      S/P hysterectomy      History of back surgery      H/O foot surgery      S/P eye surgery      Subclavian arterial stenosis          FAMILY HISTORY  Family history of cervical cancer    FH: thyroid cancer        SOCIAL HISTORY  Social History:  lives w  and multiple other family members (2024 22:53)        ROS  General: Denies rigors, nightsweats  HEENT: Denies headache, rhinorrhea, sore throat, eye pain  CV: Denies CP, palpitations  PULM: as noted above   GI: Denies hematemesis, hematochezia, melena  : Denies discharge, hematuria  MSK: Denies arthralgias, myalgias  SKIN: Denies rash, lesions  NEURO: Denies paresthesias, weakness  PSYCH: Denies depression, anxiety     VITALS:  T(F): 98.4, Max: 98.5 (02-10-25 @ 15:35)  HR: 77  BP: 115/75  RR: 18Vital Signs Last 24 Hrs  T(C): 36.9 (2025 07:21), Max: 36.9 (10 Feb 2025 15:35)  T(F): 98.4 (2025 07:21), Max: 98.5 (10 Feb 2025 15:35)  HR: 77 (2025 07:21) (74 - 111)  BP: 115/75 (2025 07:21) (93/56 - 132/78)  BP(mean): 68 (2025 05:29) (68 - 68)  RR: 18 (2025 07:21) (18 - 18)  SpO2: 97% (2025 07:21) (93% - 97%)    Parameters below as of 2025 07:21  Patient On (Oxygen Delivery Method): nasal cannula        PHYSICAL EXAM:  Gen: NAD, resting in bed NC  HEENT: Normocephalic, atraumatic  Neck: supple, no lymphadenopathy  CV: Regular rate & regular rhythm  Lungs: decreased BS at bases, no fremitus  Abdomen: Soft, BS present  Ext: Warm, well perfused  Neuro: non focal, awake  Skin: no rash, no erythema  Lines: no phlebitis     TESTS & MEASUREMENTS:                        14.5   4.94  )-----------( 131      ( 10 Feb 2025 17:43 )             43.8     02-10    137  |  95[L]  |  12  ----------------------------<  110[H]  4.1   |  27  |  1.0    Ca    8.9      10 Feb 2025 17:43    TPro  6.6  /  Alb  4.2  /  TBili  0.3  /  DBili  x   /  AST  13  /  ALT  8   /  AlkPhos  68  02-10      LIVER FUNCTIONS - ( 10 Feb 2025 17:43 )  Alb: 4.2 g/dL / Pro: 6.6 g/dL / ALK PHOS: 68 U/L / ALT: 8 U/L / AST: 13 U/L / GGT: x           Urinalysis Basic - ( 10 Feb 2025 19:44 )    Color: Yellow / Appearance: Cloudy / S.011 / pH: x  Gluc: x / Ketone: Negative mg/dL  / Bili: Negative / Urobili: 1.0 mg/dL   Blood: x / Protein: Trace mg/dL / Nitrite: Negative   Leuk Esterase: Trace / RBC: 2 /HPF / WBC 2 /HPF   Sq Epi: x / Non Sq Epi: 10 /HPF / Bacteria: Few /HPF        Urinalysis with Rflx Culture (collected 02-10-25 @ 19:44)    Urinalysis with Rflx Culture (collected 24 @ 17:06)    Culture - Blood (collected 24 @ 15:30)  Source: .Blood Blood-Peripheral  Final Report (24 @ 23:00):    No growth at 5 days    Culture - Blood (collected 24 @ 15:30)  Source: .Blood Blood-Peripheral  Final Report (24 @ 23:00):    No growth at 5 days    Culture - Blood (collected 24 @ 04:43)  Source: .Blood None  Final Report (24 @ 17:01):    No growth at 5 days    Culture - Blood (collected 24 @ 16:41)  Source: .Blood None  Final Report (24 @ 01:00):    No growth at 5 days    Culture - Blood (collected 24 @ 11:40)  Source: .Blood None  Final Report (24 @ 22:00):    No growth at 5 days    Culture - Blood (collected 24 @ 05:00)  Source: .Blood None  Final Report (24 @ 14:00):    No growth at 5 days    Culture - Fungal, Body Fluid (collected 24 @ 13:54)  Source: Pleural Fl Pleural Fluid  Final Report (24 @ 23:00):    No fungus isolated at 4 weeks.    Culture - Body Fluid with Gram Stain (collected 24 @ 13:54)  Source: Pleural Fl Pleural Fluid  Gram Stain (24 @ 03:18):    polymorphonuclear leukocytes seen    No organisms seen    by cytocentrifuge  Final Report (24 @ 18:31):    No growth at 5 days    Culture - Blood (collected 24 @ 02:13)  Source: .Blood Blood-Peripheral  Final Report (24 @ 10:01):    No growth at 5 days    Culture - Viral, General (collected 24 @ 17:00)    Culture - Fungal, Body Fluid (collected 24 @ 17:00)  Source: .Body Fluid Other, Fluid  Final Report (24 @ 23:01):    No fungus isolated at 4 weeks.    Culture - Acid Fast - Body Fluid w/Smear (collected 24 @ 17:00)  Source: Pericardial Other, Pericardial Fluid  Final Report (24 @ 23:03):    No acid-fast bacilli isolated after 6 weeks.    Culture - Body Fluid with Gram Stain (collected 24 @ 17:00)  Source: Pericardial Other, Pericardial Fluid  Gram Stain (24 @ 02:51):    polymorphonuclear leukocytes seen    No organisms seen    by cytocentrifuge  Final Report (24 @ 16:37):    No growth at 5 days    Culture - Blood (collected 24 @ 16:11)  Source: .Blood Blood  Gram Stain (24 @ 19:04):    Growth in anaerobic bottle: Gram Positive Cocci in Clusters  Final Report (24 @ 15:03):    Growth in anaerobic bottle: Staphylococcus hominis    Isolation of Coagulase negative Staphylococcus from single blood culture    sets may represent    contamination. Contact the Microbiology Department at [phone] if    susceptibility testing is    clinically indicated.    Direct identification is available within approximately 3-5    hours either by Blood Panel Multiplexed PCR or Direct    MALDI-TOF. Details: [email or web address]  Organism: Blood Culture PCR (24 @ 15:03)  Organism: Blood Culture PCR (24 @ 15:03)      Method Type: PCR      -  Coagulase negative Staphylococcus: Detec    Culture - Blood (collected 24 @ 16:11)  Source: .Blood Blood  Final Report (24 @ 23:00):    No growth at 5 days    Culture - Urine (collected 24 @ 15:29)  Source: Clean Catch Clean Catch (Midstream)  Final Report (24 @ 09:29):    No growth    Culture - Stool (collected 24 @ 17:06)  Source: .Stool Feces  Final Report (24 @ 17:57):    No enteric pathogens isolated.    (Stool culture examined for Salmonella,    Shigella, Campylobacter, Aeromonas, Plesiomonas,    Vibrio, E.coli O157 and Yersinia)    Culture - Blood (collected 24 @ 19:59)  Source: .Blood Blood  Final Report (24 @ 07:00):    No growth at 5 days    Culture - Blood (collected 24 @ 19:59)  Source: .Blood Blood  Final Report (24 @ 07:00):    No growth at 5 days        Lactate, Blood: 1.2 mmol/L (02-10-25 @ 17:43)  Blood Gas Venous - Lactate: 1.2 mmol/L (02-10-25 @ 17:43)      INFECTIOUS DISEASES TESTING  MRSA PCR Result.: Negative (24 @ 10:20)  Streptococcus pneumoniae Ag, Ur Result: Negative (24 @ 10:20)  Legionella Antigen, Urine: Negative (24 @ 10:20)  Procalcitonin: 0.09 ng/mL (24 @ 08:29)  MRSA PCR Result.: Negative (24 @ 08:45)  Procalcitonin: 0.14 ng/mL (24 @ 16:41)  Fungitell: <31 pg/mL (24 @ 16:41)  Procalcitonin: 0.25 ng/mL (24 @ 15:53)  Rapid RVP Result: NotDetec (24 @ 07:12)  HIV-1/2 Combo Result: Nonreact (24 @ 11:50)  Hepatitis B Surface Antigen: Nonreact (24 @ 11:50)  MRSA PCR Result.: Negative (24 @ 23:15)  Legionella Antigen, Urine: Negative (24 @ 16:53)  Streptococcus pneumoniae Ag, Ur Result: Negative (24 @ 16:53)  Procalcitonin: 0.06 ng/mL (24 @ 06:31)      INFLAMMATORY MARKERS      RADIOLOGY & ADDITIONAL TESTS:  I have personally reviewed the last Chest xray  CXR      CT      CARDIOLOGY TESTING      MEDICATIONS  albuterol/ipratropium for Nebulization 3 Nebulizer every 6 hours  albuterol/ipratropium for Nebulization. 3 Nebulizer once  cholecalciferol 2000 Oral daily  colchicine 0.6 Oral two times a day  enoxaparin Injectable 120 SubCutaneous every 24 hours  folic acid 1 Oral daily  meropenem  IVPB 1000 IV Intermittent every 8 hours  metoprolol succinate  Oral daily  mycophenolate mofetil 500 Oral two times a day  polyethylene glycol 3350 17 Oral daily  senna 2 Oral at bedtime  vancomycin  IVPB 1000 IV Intermittent every 24 hours        ANTIBIOTICS:  meropenem  IVPB 1000 milliGRAM(s) IV Intermittent every 8 hours  vancomycin  IVPB 1000 milliGRAM(s) IV Intermittent every 24 hours      ALLERGIES:  Arava (Anaphylaxis; Angioedema)  Plaquenil (Other)  Vantin (Other (Mild))  adhesives (Rash; Urticaria)  Avelox (Other)

## 2025-02-11 NOTE — H&P ADULT - TIME BILLING
65 minutes spent to complete patient's bedside assessment, review medical chart, discuss medical plan of care with covering medical team with >50% of time spent face to face with patient, discussion with patient/family and/or coordination of care.

## 2025-02-11 NOTE — H&P ADULT - ATTENDING COMMENTS
Patient seen on 02/10/25.      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 Pt has been treated at home for RSV and pneumonia (completed courses of both Levaquin and Doxycycline) by urgent care, PCP, and by Dr. Dumont over the past month but has continued to have intermittent fever. Over the past week, the fever has become persistent, stating it is often as high as 104. She took tylenol immediately before coming to the ED, but no other new medications.     #Fever of unknown origin  #COPD on home O2 2L PRN  - fevers ongoing x1 month  - completed courses of doxycycline + levaquin, was also on Prednisone for most of the last month  - patient with productive cough on exam and diffuse wheezing b/l, requiring 2L nasal cannula  - CT Chest from 1/23/2025- Compared to prior CT chest, Several bilateral pulmonary nodules measuring up to a 3 mm subpleural nodule within right lower lobe. Many of these nodules are without significant change with some that are new possibly focus of mucus plugging. Follow-up CT chest may be obtained to assess for stability. Bilateral subsegmental atelectasis/scarring.  - Vanc + Meropenem  - ID consult, Pulm consult (follows with Dr Yamil Martinez)  - c/w Duonebs + Albuterol PRN  - patient reports that she takes Breztri at home, no longer uses Trelegy  - f/u BCx, ESR, CRP, Procalcitonin    #RA, SLE/triple (+)APLS on Lovenox (due to prior ?ICH on warfarin)  #Hx of DVT/PE  #cervical CA s/p hysterectomy  #laryngeal CA s/p chemo/rad  #Immunocompromised state - d/t autoimmune disease, steroid use  - c/w Lovenox 120mg qhs  - c/w mycophenolate mofetil 500 mg PO BID   - c/w folic acid 1mg qd   - c/w vitamin D 5000U PO qd  - Rheum consult follows with Dr. 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

## 2025-02-11 NOTE — H&P ADULT - HISTORY OF PRESENT ILLNESS
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 Pt has been treated at home for RSV and pneumonia by Dr. Dumont over the past month with oral antibiotics but has continued to have intermittent fever. Over the past week, the fever has become persistent, despite taking levofloxacin daily. She also reports new urinary hesitancy and urgency over the past few days. She took tylenol immediately before coming to the ED, but no other new medications Denies nausea, vomiting, diarrhea, chest pain, rashes.     Vitals in ED: T 98.5, , /74, RR 19, Sat 93% on RA --> improved to 96% on 2L NC  Labs in ED: unremarkable. UA with trace LE but otherwise neg. RVP neg.   Imaging:  CXR: No radiographic evidence of acute cardiopulmonary disease.    s/p cefepime 2g , Vanc 1 g, and 1L NS bolus in ED. Admitted to medicine for further workup for fevers of unknown origin.   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 Pt has been treated at home for RSV and pneumonia (completed courses of both Levaquin and Doxycycline) by urgent care, PCP, and by Dr. Dumont over the past month but has continued to have intermittent fever. Over the past week, the fever has become persistent, stating it is often as high as 104. She took tylenol immediately before coming to the ED, but no other new medications Denies nausea, vomiting, diarrhea, chest pain, rashes.     Vitals in ED: T 98.5, , /74, RR 19, Sat 93% on RA --> improved to 96% on 2L NC  Labs in ED: unremarkable. UA with trace LE but otherwise neg. RVP neg.   Imaging:  CXR: No radiographic evidence of acute cardiopulmonary disease.    s/p cefepime 2g , Vanc 1 g, and 1L NS bolus in ED. Admitted to medicine for further workup for fevers of unknown origin.

## 2025-02-11 NOTE — CONSULT NOTE ADULT - TIME BILLING
I have personally seen and examined this patient.  I have reviewed all pertinent clinical information and reviewed all relevant imaging and diagnostic studies personally.   I counseled the patient about diagnostic testing and treatment plan.   I discussed my recommendations with the primary team Dr Cortez

## 2025-02-11 NOTE — H&P ADULT - ASSESSMENT
**THIS NOTE IS INCOMPLETE**        #Fever of unknown origin  - fevers ongoing x1 month  - f/u BCx, ESR, CRP, Procalcitonin    #RA, SLE/triple (+)APLS on Lovenox (due to prior ?ICH on warfarin)  #DVT/PE  #cervical CA s/p hysterectomy  #laryngeal CA s/p chemo/rad  #Immunocompromised state - d/t autoimmune disease, steroid use  - c/w Lovenox 120mg qhs  - c/w mycophenolate mofetil 500 mg PO BID   - c/w folic acid 1mg qd   - c/w vitamin D 5000U PO qd    #Chronic pericarditis secondary to most likely SLE  - c/w colchicine 0.6mg PO BID     #Chronic back pain  #Chronic opioid use  - c/w morphine ER 30mg q6h standing (recently decreased)  - naloxone PRN  - bowel regimen with miralax and adrienne    #NSTEMI 2015  #HFpEF, 6/5/24 TTE EF 65%   #s/p PPM/ICD  #subclavian stenosis s/p stent  - c/w home lasix  - c/w metoprolol ER 100mg qd     #HTN  #HLD  - c/w metoprolol ER 100mg qd     #COPD  - no active issues  - c/w albuterol PRN    MISC  #DVT prophylaxis: Lovenox  #GI prophylaxis: PPI  #Diet:   #Activity: IAT  #Code status: Full Code  #Disposition: Admit to Medicine  HCP  Vernon Bauman [phone]      54 year old F with with extensive PMHx, including HTN, HLD, HFpEF (EF 65% June 2024), RA, 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 Pt has been treated at home for RSV and pneumonia (completed courses of both Levaquin and Doxycycline) by urgent care, PCP, and by Dr. Dumont over the past month but has continued to have intermittent fever. Over the past week, the fever has become persistent, stating it is often as high as 104. She took tylenol immediately before coming to the ED, but no other new medications.     #Fever of unknown origin  #COPD on home O2 2L PRN  - fevers ongoing x1 month  - completed courses of doxycycline + levaquin, was also on Prednisone for most of the last month  - patient with productive cough on exam and diffuse wheezing b/l, requiring 2L nasal cannula  - CT Chest from 1/23/2025- Compared to prior CT chest, Several bilateral pulmonary nodules measuring up to a 3 mm subpleural nodule within right lower lobe. Many of these nodules are without significant change with some that are new possibly focus of mucus plugging. Follow-up CT chest may be obtained to assess for stability. Bilateral subsegmental atelectasis/scarring.  - Vanc + Meropenem  - ID consult, Pulm consult (follows with Dr Yamil Martinez)  - c/w Duonebs + Albuterol PRN  - patient reports that she takes Breztri at home, no longer uses Trelegy  - f/u BCx, ESR, CRP, Procalcitonin    #RA, SLE/triple (+)APLS on Lovenox (due to prior ?ICH on warfarin)  #Hx of DVT/PE  #cervical CA s/p hysterectomy  #laryngeal CA s/p chemo/rad  #Immunocompromised state - d/t autoimmune disease, steroid use  - c/w Lovenox 120mg qhs  - c/w mycophenolate mofetil 500 mg PO BID   - c/w folic acid 1mg qd   - c/w vitamin D 5000U PO qd  - Rheum consult follows with Dr. Rivera    #Chronic pericarditis secondary to most likely SLE  - c/w colchicine 0.6mg PO BID     #Chronic back pain  #Chronic opioid use  - c/w morphine ER 30mg q6h standing (recently decreased)  - naloxone PRN  - bowel regimen with miralax and adrienne    #NSTEMI 2015  #HFpEF, 6/5/24 TTE EF 65%   #s/p PPM/ICD  #subclavian stenosis s/p stent  - c/w home lasix  - c/w metoprolol ER 100mg qd     #HTN  #HLD  - c/w metoprolol ER 100mg qd     MISC  #DVT prophylaxis: Lovenox  #GI prophylaxis: PPI  #Diet: DASH  #Activity: IAT  #Code status: Full Code  #Disposition: Admit to Medicine  HCP  Vernon Bauman [phone]      54 year old F with with extensive PMHx, including HTN, HLD, HFpEF (EF 65% June 2024), RA, 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 Pt has been treated at home for RSV and pneumonia (completed courses of both Levaquin and Doxycycline) by urgent care, PCP, and by Dr. Dumont over the past month but has continued to have intermittent fever. Over the past week, the fever has become persistent, stating it is often as high as 104. She took tylenol immediately before coming to the ED, but no other new medications.     #Fever of unknown origin  #COPD on home O2 2L PRN  - fevers ongoing x1 month  - completed courses of doxycycline + levaquin, was also on Prednisone for most of the last month  - patient with productive cough on exam and diffuse wheezing b/l, requiring 2L nasal cannula  - CT Chest from 1/23/2025- Compared to prior CT chest, Several bilateral pulmonary nodules measuring up to a 3 mm subpleural nodule within right lower lobe. Many of these nodules are without significant change with some that are new possibly focus of mucus plugging. Follow-up CT chest may be obtained to assess for stability. Bilateral subsegmental atelectasis/scarring.  - Vanc + Meropenem  - ID consult, Pulm consult (follows with Dr Yamil Martinez)  - c/w Duonebs + Albuterol PRN  - patient reports that she takes Breztri at home, no longer uses Trelegy  - f/u BCx, ESR, CRP, Procalcitonin    #RA, SLE/triple (+)APLS on Lovenox (due to prior ?ICH on warfarin)  #Hx of DVT/PE  #cervical CA s/p hysterectomy  #laryngeal CA s/p chemo/rad  #Immunocompromised state - d/t autoimmune disease, steroid use  - c/w Lovenox 120mg qhs  - c/w mycophenolate mofetil 500 mg PO BID   - c/w folic acid 1mg qd   - c/w vitamin D 5000U PO qd  - Rheum consult follows with Dr. Rivera    #Chronic pericarditis secondary to most likely SLE  - c/w colchicine 0.6mg PO BID     #Chronic back pain  #Chronic opioid use  - c/w morphine ER 30mg q6h standing (recently decreased)  - naloxone PRN  - bowel regimen with miralax and senna    #NSTEMI 2015  #HFpEF, 6/5/24 TTE EF 65%   #s/p PPM/ICD  #subclavian stenosis s/p stent  - c/w home lasix  - c/w metoprolol ER 100mg qd     #HTN  #HLD  - c/w metoprolol ER 100mg qd     MISC  #DVT prophylaxis: Lovenox  #GI prophylaxis: PPI  #Diet: DASH  #Activity: IAT  #Code status: Full Code  #Disposition: Admit to Medicine  HCP  Vernon Bauman [phone]

## 2025-02-11 NOTE — H&P ADULT - NSHPPHYSICALEXAM_GEN_ALL_CORE
GENERAL: NAD, lying in bed comfortably  HEAD:  Atraumatic, normocephalic  EYES: EOMI, PERRLA, conjunctiva and sclera clear  ENT: Moist mucous membranes  NECK: Supple, no JVD  HEART: Regular rate and rhythm, no murmurs, rubs, or gallops  LUNGS: Unlabored respirations.  Clear to auscultation bilaterally, no crackles, wheezing, or rhonchi  ABDOMEN: Soft, nontender, nondistended, +BS  EXTREMITIES: 2+ peripheral pulses bilaterally. No clubbing, cyanosis, or edema  NERVOUS SYSTEM:  A&Ox3, no focal deficits   SKIN: No rashes or lesions GENERAL: NAD, lying in bed comfortably  HEAD:  Atraumatic, normocephalic  EYES: EOMI, PERRLA, conjunctiva and sclera clear  ENT: Moist mucous membranes  NECK: Supple, no JVD  HEART: Regular rate and rhythm, no murmurs, rubs, or gallops  LUNGS: Diffuse wheezing auscultated b/l  ABDOMEN: Soft, nontender, nondistended, +BS  EXTREMITIES: 2+ peripheral pulses bilaterally. No clubbing, cyanosis, or edema  NERVOUS SYSTEM:  A&Ox3, no focal deficits   SKIN: No rashes or lesions

## 2025-02-11 NOTE — PHARMACOTHERAPY INTERVENTION NOTE - COMMENTS
Recommended discontinuing meropenem as per ID consult recommendations.  
Recommended discontinuing vancomycin as per ID consult recommendations.

## 2025-02-12 ENCOUNTER — RESULT REVIEW (OUTPATIENT)
Age: 55
End: 2025-02-12

## 2025-02-12 LAB
ALBUMIN SERPL ELPH-MCNC: 4 G/DL — SIGNIFICANT CHANGE UP (ref 3.5–5.2)
ALP SERPL-CCNC: 72 U/L — SIGNIFICANT CHANGE UP (ref 30–115)
ALT FLD-CCNC: 7 U/L — SIGNIFICANT CHANGE UP (ref 0–41)
ANION GAP SERPL CALC-SCNC: 10 MMOL/L — SIGNIFICANT CHANGE UP (ref 7–14)
AST SERPL-CCNC: 14 U/L — SIGNIFICANT CHANGE UP (ref 0–41)
BASOPHILS # BLD AUTO: 0.01 K/UL — SIGNIFICANT CHANGE UP (ref 0–0.2)
BASOPHILS NFR BLD AUTO: 0.2 % — SIGNIFICANT CHANGE UP (ref 0–1)
BILIRUB SERPL-MCNC: 0.3 MG/DL — SIGNIFICANT CHANGE UP (ref 0.2–1.2)
BUN SERPL-MCNC: 9 MG/DL — LOW (ref 10–20)
C3 SERPL-MCNC: 140 MG/DL — SIGNIFICANT CHANGE UP (ref 81–157)
C4 SERPL-MCNC: 38 MG/DL — SIGNIFICANT CHANGE UP (ref 13–39)
CALCIUM SERPL-MCNC: 9 MG/DL — SIGNIFICANT CHANGE UP (ref 8.4–10.5)
CCP IGG SERPL-ACNC: <8 U/ML — SIGNIFICANT CHANGE UP
CHLORIDE SERPL-SCNC: 98 MMOL/L — SIGNIFICANT CHANGE UP (ref 98–110)
CO2 SERPL-SCNC: 31 MMOL/L — SIGNIFICANT CHANGE UP (ref 17–32)
CREAT ?TM UR-MCNC: 219 MG/DL — SIGNIFICANT CHANGE UP
CREAT SERPL-MCNC: 0.8 MG/DL — SIGNIFICANT CHANGE UP (ref 0.7–1.5)
CRP SERPL-MCNC: 41.2 MG/L — HIGH
EGFR: 88 ML/MIN/1.73M2 — SIGNIFICANT CHANGE UP
EOSINOPHIL # BLD AUTO: 0.07 K/UL — SIGNIFICANT CHANGE UP (ref 0–0.7)
EOSINOPHIL NFR BLD AUTO: 1.2 % — SIGNIFICANT CHANGE UP (ref 0–8)
ERYTHROCYTE [SEDIMENTATION RATE] IN BLOOD: 46 MM/HR — HIGH (ref 0–20)
GLUCOSE SERPL-MCNC: 110 MG/DL — HIGH (ref 70–99)
HCT VFR BLD CALC: 42.1 % — SIGNIFICANT CHANGE UP (ref 37–47)
HGB BLD-MCNC: 13.6 G/DL — SIGNIFICANT CHANGE UP (ref 12–16)
IMM GRANULOCYTES NFR BLD AUTO: 0.5 % — HIGH (ref 0.1–0.3)
LYMPHOCYTES # BLD AUTO: 2.4 K/UL — SIGNIFICANT CHANGE UP (ref 1.2–3.4)
LYMPHOCYTES # BLD AUTO: 40.8 % — SIGNIFICANT CHANGE UP (ref 20.5–51.1)
MAGNESIUM SERPL-MCNC: 1.8 MG/DL — SIGNIFICANT CHANGE UP (ref 1.8–2.4)
MCHC RBC-ENTMCNC: 28.7 PG — SIGNIFICANT CHANGE UP (ref 27–31)
MCHC RBC-ENTMCNC: 32.3 G/DL — SIGNIFICANT CHANGE UP (ref 32–37)
MCV RBC AUTO: 88.8 FL — SIGNIFICANT CHANGE UP (ref 81–99)
MONOCYTES # BLD AUTO: 0.52 K/UL — SIGNIFICANT CHANGE UP (ref 0.1–0.6)
MONOCYTES NFR BLD AUTO: 8.8 % — SIGNIFICANT CHANGE UP (ref 1.7–9.3)
NEUTROPHILS # BLD AUTO: 2.85 K/UL — SIGNIFICANT CHANGE UP (ref 1.4–6.5)
NEUTROPHILS NFR BLD AUTO: 48.5 % — SIGNIFICANT CHANGE UP (ref 42.2–75.2)
NRBC BLD AUTO-RTO: 0 /100 WBCS — SIGNIFICANT CHANGE UP (ref 0–0)
PLATELET # BLD AUTO: 177 K/UL — SIGNIFICANT CHANGE UP (ref 130–400)
PMV BLD: 10.6 FL — HIGH (ref 7.4–10.4)
POTASSIUM SERPL-MCNC: 4.1 MMOL/L — SIGNIFICANT CHANGE UP (ref 3.5–5)
POTASSIUM SERPL-SCNC: 4.1 MMOL/L — SIGNIFICANT CHANGE UP (ref 3.5–5)
PROCALCITONIN SERPL-MCNC: 0.04 NG/ML — SIGNIFICANT CHANGE UP (ref 0.02–0.1)
PROT ?TM UR-MCNC: 28 MG/DLG/24H — SIGNIFICANT CHANGE UP
PROT SERPL-MCNC: 6.6 G/DL — SIGNIFICANT CHANGE UP (ref 6–8)
PROT/CREAT UR-RTO: 0.1 RATIO — SIGNIFICANT CHANGE UP (ref 0–0.2)
RBC # BLD: 4.74 M/UL — SIGNIFICANT CHANGE UP (ref 4.2–5.4)
RBC # FLD: 13.9 % — SIGNIFICANT CHANGE UP (ref 11.5–14.5)
RF+CCP IGG SER-IMP: NEGATIVE — SIGNIFICANT CHANGE UP
RHEUMATOID FACT SERPL-ACNC: <10 IU/ML — SIGNIFICANT CHANGE UP (ref 0–13)
SODIUM SERPL-SCNC: 139 MMOL/L — SIGNIFICANT CHANGE UP (ref 135–146)
WBC # BLD: 5.88 K/UL — SIGNIFICANT CHANGE UP (ref 4.8–10.8)
WBC # FLD AUTO: 5.88 K/UL — SIGNIFICANT CHANGE UP (ref 4.8–10.8)

## 2025-02-12 PROCEDURE — 99233 SBSQ HOSP IP/OBS HIGH 50: CPT

## 2025-02-12 PROCEDURE — 93306 TTE W/DOPPLER COMPLETE: CPT | Mod: 26

## 2025-02-12 RX ORDER — PREDNISONE 5 MG/1
40 TABLET ORAL DAILY
Refills: 0 | Status: DISCONTINUED | OUTPATIENT
Start: 2025-02-12 | End: 2025-02-13

## 2025-02-12 RX ADMIN — IPRATROPIUM BROMIDE AND ALBUTEROL SULFATE 3 MILLILITER(S): .5; 2.5 SOLUTION RESPIRATORY (INHALATION) at 13:48

## 2025-02-12 RX ADMIN — Medication 1 MILLIGRAM(S): at 16:25

## 2025-02-12 RX ADMIN — MYCOPHENOLATE MOFETIL 500 MILLIGRAM(S): 200 POWDER, FOR SUSPENSION ORAL at 17:28

## 2025-02-12 RX ADMIN — Medication 100 MILLIGRAM(S): at 05:04

## 2025-02-12 RX ADMIN — MORPHINE SULFATE 30 MILLIGRAM(S): 60 TABLET, FILM COATED, EXTENDED RELEASE ORAL at 05:04

## 2025-02-12 RX ADMIN — MORPHINE SULFATE 30 MILLIGRAM(S): 60 TABLET, FILM COATED, EXTENDED RELEASE ORAL at 16:25

## 2025-02-12 RX ADMIN — MORPHINE SULFATE 30 MILLIGRAM(S): 60 TABLET, FILM COATED, EXTENDED RELEASE ORAL at 05:39

## 2025-02-12 RX ADMIN — Medication 2000 UNIT(S): at 11:03

## 2025-02-12 RX ADMIN — COLCHICINE 0.6 MILLIGRAM(S): 0.6 TABLET ORAL at 05:04

## 2025-02-12 RX ADMIN — MORPHINE SULFATE 30 MILLIGRAM(S): 60 TABLET, FILM COATED, EXTENDED RELEASE ORAL at 10:56

## 2025-02-12 RX ADMIN — MORPHINE SULFATE 30 MILLIGRAM(S): 60 TABLET, FILM COATED, EXTENDED RELEASE ORAL at 17:30

## 2025-02-12 RX ADMIN — Medication 1 MILLIGRAM(S): at 11:03

## 2025-02-12 RX ADMIN — PREDNISONE 40 MILLIGRAM(S): 5 TABLET ORAL at 16:24

## 2025-02-12 RX ADMIN — MORPHINE SULFATE 30 MILLIGRAM(S): 60 TABLET, FILM COATED, EXTENDED RELEASE ORAL at 11:43

## 2025-02-12 RX ADMIN — ENOXAPARIN SODIUM 120 MILLIGRAM(S): 100 INJECTION SUBCUTANEOUS at 03:26

## 2025-02-12 RX ADMIN — MORPHINE SULFATE 30 MILLIGRAM(S): 60 TABLET, FILM COATED, EXTENDED RELEASE ORAL at 21:22

## 2025-02-12 RX ADMIN — IPRATROPIUM BROMIDE AND ALBUTEROL SULFATE 3 MILLILITER(S): .5; 2.5 SOLUTION RESPIRATORY (INHALATION) at 20:17

## 2025-02-12 RX ADMIN — MYCOPHENOLATE MOFETIL 500 MILLIGRAM(S): 200 POWDER, FOR SUSPENSION ORAL at 05:04

## 2025-02-12 RX ADMIN — COLCHICINE 0.6 MILLIGRAM(S): 0.6 TABLET ORAL at 17:28

## 2025-02-12 NOTE — PROGRESS NOTE ADULT - ATTENDING COMMENTS
My note supersedes the resident's note in the event of a discrepancy -    Patient seen and examined this morning  lying in bed  in nad   ambulated around the room independently this morning     T(C): 37.1 (02-12-25 @ 07:30), Max: 37.2 (02-11-25 @ 22:46)  HR: 74 (02-12-25 @ 07:30) (74 - 93)  BP: 122/71 (02-12-25 @ 07:30) (122/71 - 152/80)  RR: 18 (02-11-25 @ 22:46) (18 - 18)  SpO2: 95% (02-12-25 @ 07:30) (95% - 97%)    albuterol    90 MICROgram(s) HFA Inhaler 2 Puff(s) Inhalation every 6 hours PRN  albuterol/ipratropium for Nebulization 3 milliLiter(s) Nebulizer every 6 hours  albuterol/ipratropium for Nebulization. 3 milliLiter(s) Nebulizer once  ALPRAZolam 1 milliGRAM(s) Oral every 8 hours PRN  cholecalciferol 2000 Unit(s) Oral daily  colchicine 0.6 milliGRAM(s) Oral two times a day  enoxaparin Injectable 120 milliGRAM(s) SubCutaneous every 24 hours  folic acid 1 milliGRAM(s) Oral daily  metoclopramide 5 milliGRAM(s) Oral daily PRN  metoprolol succinate  milliGRAM(s) Oral daily  morphine  IR 30 milliGRAM(s) Oral every 3 hours PRN  mycophenolate mofetil 500 milliGRAM(s) Oral two times a day  polyethylene glycol 3350 17 Gram(s) Oral daily  senna 2 Tablet(s) Oral at bedtime    54 year old F with with extensive PMHx, including HTN, HLD, HFpEF (EF 65% June 2024), RA, 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 Pt has been treated at home for RSV and pneumonia (completed courses of both Levaquin and Doxycycline) by urgent care, PCP, and by Dr. Dumont over the past month but has continued to have intermittent fever. Over the past week, the fever has become persistent, stating it is often as high as 104. She took tylenol immediately before coming to the ED, but no other new medications.     #Fever x 1 month   #COPD on home O2 2L PRN at home   #wheezing  - completed courses of doxycycline + levaquin, was also on Prednisone for most of the last month  - CT Chest from 1/23/2025- Compared to prior CT chest, Several bilateral pulmonary nodules measuring up to a 3 mm subpleural nodule within right lower lobe. Many of these nodules are without significant change with some that are new possibly focus of mucus plugging. Follow-up CT chest may be obtained to assess for stability. Bilateral subsegmental atelectasis/scarring.  - given dose of Vanco and Meropenem  - ID consult appreciated - monitor off abx  - c/w Duonebs + Albuterol PRN  - start oral prednisone today   - patient reports that she takes Breztri at home, no longer uses Trelegy  - f/u rheumatological workup  - Rheum consulted but on vacation until Tuesday    #RA, SLE/triple (+)APLS on Lovenox (due to prior ?ICH on warfarin)  #Hx of DVT/PE  #cervical CA s/p hysterectomy  #laryngeal CA s/p chemo/rad  #Immunocompromised state - d/t autoimmune disease, steroid use  - c/w Lovenox 120mg qhs - home med  - c/w mycophenolate mofetil 500 mg PO BID   - c/w folic acid 1mg qd   - c/w vitamin D 5000U PO qd  - Rheum consult follows with Dr. Rivera, follow recommendations     #Chronic pericarditis secondary to most likely SLE  - c/w colchicine 0.6mg PO BID   - follow echo     #Chronic back pain  #Chronic opioid use  - c/w morphine ER 30mg q6h standing (recently decreased)  - naloxone PRN  - bowel regimen with miralax and senna    #NSTEMI 2015  #HFpEF, 6/5/24 TTE EF 65%   #s/p PPM/ICD  #subclavian stenosis s/p stent  #HTN  #HLD  - c/w home lasix  - c/w metoprolol ER 100mg qd     Progress Note Handoff  Pending Consults: Rheum  Pending Tests: labs  Pending Results: labs  Family Discussion: Discussed labs, meds, consults and overall plan of care with pt and medical staff. All questions answered. PFD for 24hrs if clinically stable   Disposition: Home__x___/SNF______/Other_____/Unknown at this time_____  Spent over 55 min reviewing chart, speaking with patient/family and on coordinating patient care during interdisciplinary rounds

## 2025-02-12 NOTE — PROGRESS NOTE ADULT - TIME BILLING
I have personally seen and examined this patient.  I have reviewed all pertinent clinical information and reviewed all relevant imaging and diagnostic studies personally.   I counseled the patient about diagnostic testing and treatment plan.   I discussed my recommendations with the primary team Dr Michaud

## 2025-02-13 ENCOUNTER — TRANSCRIPTION ENCOUNTER (OUTPATIENT)
Age: 55
End: 2025-02-13

## 2025-02-13 VITALS
OXYGEN SATURATION: 97 % | SYSTOLIC BLOOD PRESSURE: 135 MMHG | HEART RATE: 70 BPM | RESPIRATION RATE: 18 BRPM | DIASTOLIC BLOOD PRESSURE: 73 MMHG | TEMPERATURE: 98 F

## 2025-02-13 LAB
ALBUMIN SERPL ELPH-MCNC: 3.9 G/DL — SIGNIFICANT CHANGE UP (ref 3.5–5.2)
ALP SERPL-CCNC: 85 U/L — SIGNIFICANT CHANGE UP (ref 30–115)
ALT FLD-CCNC: 5 U/L — SIGNIFICANT CHANGE UP (ref 0–41)
ANION GAP SERPL CALC-SCNC: 15 MMOL/L — HIGH (ref 7–14)
ANTI-RIBONUCLEAR PROTEIN: <0.2 AI — SIGNIFICANT CHANGE UP
AST SERPL-CCNC: 12 U/L — SIGNIFICANT CHANGE UP (ref 0–41)
BASOPHILS # BLD AUTO: 0.02 K/UL — SIGNIFICANT CHANGE UP (ref 0–0.2)
BASOPHILS NFR BLD AUTO: 0.3 % — SIGNIFICANT CHANGE UP (ref 0–1)
BILIRUB SERPL-MCNC: 0.3 MG/DL — SIGNIFICANT CHANGE UP (ref 0.2–1.2)
BUN SERPL-MCNC: 11 MG/DL — SIGNIFICANT CHANGE UP (ref 10–20)
CALCIUM SERPL-MCNC: 9.3 MG/DL — SIGNIFICANT CHANGE UP (ref 8.4–10.5)
CHLORIDE SERPL-SCNC: 98 MMOL/L — SIGNIFICANT CHANGE UP (ref 98–110)
CO2 SERPL-SCNC: 26 MMOL/L — SIGNIFICANT CHANGE UP (ref 17–32)
CREAT SERPL-MCNC: 0.8 MG/DL — SIGNIFICANT CHANGE UP (ref 0.7–1.5)
DSDNA AB FLD-ACNC: <0.2 AI — SIGNIFICANT CHANGE UP
DSDNA AB SER-ACNC: <1 IU/ML — SIGNIFICANT CHANGE UP
EGFR: 88 ML/MIN/1.73M2 — SIGNIFICANT CHANGE UP
ENA SM AB FLD QL: <0.2 AI — SIGNIFICANT CHANGE UP
ENA SS-A AB FLD IA-ACNC: <0.2 AI — SIGNIFICANT CHANGE UP
EOSINOPHIL # BLD AUTO: 0.11 K/UL — SIGNIFICANT CHANGE UP (ref 0–0.7)
EOSINOPHIL NFR BLD AUTO: 1.8 % — SIGNIFICANT CHANGE UP (ref 0–8)
GLUCOSE SERPL-MCNC: 95 MG/DL — SIGNIFICANT CHANGE UP (ref 70–99)
HCT VFR BLD CALC: 43 % — SIGNIFICANT CHANGE UP (ref 37–47)
HGB BLD-MCNC: 13.9 G/DL — SIGNIFICANT CHANGE UP (ref 12–16)
IMM GRANULOCYTES NFR BLD AUTO: 0.5 % — HIGH (ref 0.1–0.3)
LYMPHOCYTES # BLD AUTO: 2.95 K/UL — SIGNIFICANT CHANGE UP (ref 1.2–3.4)
LYMPHOCYTES # BLD AUTO: 47.7 % — SIGNIFICANT CHANGE UP (ref 20.5–51.1)
MCHC RBC-ENTMCNC: 28.2 PG — SIGNIFICANT CHANGE UP (ref 27–31)
MCHC RBC-ENTMCNC: 32.3 G/DL — SIGNIFICANT CHANGE UP (ref 32–37)
MCV RBC AUTO: 87.2 FL — SIGNIFICANT CHANGE UP (ref 81–99)
MONOCYTES # BLD AUTO: 0.57 K/UL — SIGNIFICANT CHANGE UP (ref 0.1–0.6)
MONOCYTES NFR BLD AUTO: 9.2 % — SIGNIFICANT CHANGE UP (ref 1.7–9.3)
NEUTROPHILS # BLD AUTO: 2.5 K/UL — SIGNIFICANT CHANGE UP (ref 1.4–6.5)
NEUTROPHILS NFR BLD AUTO: 40.5 % — LOW (ref 42.2–75.2)
NRBC BLD AUTO-RTO: 0 /100 WBCS — SIGNIFICANT CHANGE UP (ref 0–0)
PLATELET # BLD AUTO: 238 K/UL — SIGNIFICANT CHANGE UP (ref 130–400)
PMV BLD: 11.1 FL — HIGH (ref 7.4–10.4)
POTASSIUM SERPL-MCNC: 4 MMOL/L — SIGNIFICANT CHANGE UP (ref 3.5–5)
POTASSIUM SERPL-SCNC: 4 MMOL/L — SIGNIFICANT CHANGE UP (ref 3.5–5)
PROT SERPL-MCNC: 6.6 G/DL — SIGNIFICANT CHANGE UP (ref 6–8)
RBC # BLD: 4.93 M/UL — SIGNIFICANT CHANGE UP (ref 4.2–5.4)
RBC # FLD: 13.5 % — SIGNIFICANT CHANGE UP (ref 11.5–14.5)
SODIUM SERPL-SCNC: 139 MMOL/L — SIGNIFICANT CHANGE UP (ref 135–146)
WBC # BLD: 6.18 K/UL — SIGNIFICANT CHANGE UP (ref 4.8–10.8)
WBC # FLD AUTO: 6.18 K/UL — SIGNIFICANT CHANGE UP (ref 4.8–10.8)

## 2025-02-13 PROCEDURE — 99239 HOSP IP/OBS DSCHRG MGMT >30: CPT

## 2025-02-13 RX ORDER — PREDNISONE 5 MG/1
1 TABLET ORAL
Qty: 13 | Refills: 0
Start: 2025-02-13

## 2025-02-13 RX ADMIN — MORPHINE SULFATE 30 MILLIGRAM(S): 60 TABLET, FILM COATED, EXTENDED RELEASE ORAL at 01:52

## 2025-02-13 RX ADMIN — Medication 2000 UNIT(S): at 12:40

## 2025-02-13 RX ADMIN — ENOXAPARIN SODIUM 120 MILLIGRAM(S): 100 INJECTION SUBCUTANEOUS at 05:47

## 2025-02-13 RX ADMIN — MORPHINE SULFATE 30 MILLIGRAM(S): 60 TABLET, FILM COATED, EXTENDED RELEASE ORAL at 06:51

## 2025-02-13 RX ADMIN — Medication 100 MILLIGRAM(S): at 05:48

## 2025-02-13 RX ADMIN — IPRATROPIUM BROMIDE AND ALBUTEROL SULFATE 3 MILLILITER(S): .5; 2.5 SOLUTION RESPIRATORY (INHALATION) at 08:34

## 2025-02-13 RX ADMIN — PREDNISONE 40 MILLIGRAM(S): 5 TABLET ORAL at 05:46

## 2025-02-13 RX ADMIN — MYCOPHENOLATE MOFETIL 500 MILLIGRAM(S): 200 POWDER, FOR SUSPENSION ORAL at 05:48

## 2025-02-13 RX ADMIN — COLCHICINE 0.6 MILLIGRAM(S): 0.6 TABLET ORAL at 05:47

## 2025-02-13 RX ADMIN — Medication 1 MILLIGRAM(S): at 12:40

## 2025-02-13 RX ADMIN — Medication 1 MILLIGRAM(S): at 01:52

## 2025-02-13 RX ADMIN — MORPHINE SULFATE 30 MILLIGRAM(S): 60 TABLET, FILM COATED, EXTENDED RELEASE ORAL at 12:43

## 2025-02-13 NOTE — DISCHARGE NOTE PROVIDER - CARE PROVIDER_API CALL
Ofe Victor  Rheumatology  [address] [place] [address]  Phone: [phone]  Fax: [phone]  Follow Up Time: 1 week    Vernon Sadler  NP in [facility]  [address] [place] [id number]  Phone: [phone]  Fax: [phone]  Follow Up Time: 1 week    Andre Hurst  Pulmonary Disease  [address] [place] [id number]  Phone: [phone]  Fax: [phone]  Follow Up Time: 1 week

## 2025-02-13 NOTE — DISCHARGE NOTE NURSING/CASE MANAGEMENT/SOCIAL WORK - PATIENT PORTAL LINK FT
You can access the FollowMyHealth Patient Portal offered by [facility] by registering at the following website: [email or web address]. By joining [facility]’s FollowMyHealth portal, you will also be able to view your health information using other applications (apps) compatible with our system.

## 2025-02-13 NOTE — PROGRESS NOTE ADULT - SUBJECTIVE AND OBJECTIVE BOX
ILEANA MCNEIL  54y, Female  Allergy: Arava (Anaphylaxis; Angioedema)  Plaquenil (Other)  Vantin (Other (Mild))  adhesives (Rash; Urticaria)  Avelox (Other)    Hospital Day: 1d    Patient seen and examined earlier today. no complaints     PMH/PSH:  PAST MEDICAL & SURGICAL HISTORY:  Lupus      RA (rheumatoid arthritis)      HTN (hypertension)      CHF (congestive heart failure)      COPD (chronic obstructive pulmonary disease)      DVT (deep venous thrombosis)      Pulmonary embolism      History of laryngeal cancer      GERD (gastroesophageal reflux disease)      Cervical cancer      APS (antiphospholipid syndrome)      S/P appendectomy      S/P cholecystectomy      AICD (automatic cardioverter/defibrillator) present  [employer]      S/P hysterectomy      History of back surgery      H/O foot surgery      S/P eye surgery      Subclavian arterial stenosis          LAST 24-Hr EVENTS:    VITALS:  T(F): 97.9 (02-11-25 @ 15:48), Max: 98.4 (02-11-25 @ 07:21)  HR: 78 (02-11-25 @ 15:48)  BP: 126/64 (02-11-25 @ 15:48) (93/56 - 132/78)  RR: 18 (02-11-25 @ 15:48)  SpO2: 95% (02-11-25 @ 15:48)    Oxygen Delivery Therapy:   Oxygen Method: Nasal Cannula   FiO2: 40   LPM: 4   Targeted SpO2 Range (%): 94-98 (Most Adults patients)   Indication for O2: Increased Work of Breathing   Notify Provider for SpO2 BELOW: 90   Notify Provider for SpO2 ABOVE: 99   Notify Provider forRespiratoryRate ABOVE 25   O2 Guideline: If SPO2 is below target range may increase by 2L/min ONCE ONLY, not to exceed 6L/min and notify provider. Recheck SPO2 within 5 minutes (document SPO2). If SPO2 remains below target SPO2 range, notify provider for additional instruction.  Consider RRT with unexpected escalation in oxygen requirements especially if combined with increased respiratory rate.   O2 Weaning Guideline:  If SPO2 goal is met or exceeded for 2 hours decrease Oxygen by 2L/min. If already on 2L/min or less, trial oxygen discontinuation. Recheck SPO2 within 5 minutes and if SPO2 below target SPO2 range, resume previous oxygen therapy and notify provider (document the SPO2).    Additional Instructions:  Notify Provider for SPO2 outside of range. (02-10-25 @ 16:55)        TESTS & MEASUREMENTS:  Weight/BMI  72.6 (02-10-25 @ 17:52)                          12.2   4.70  )-----------( 125      ( 11 Feb 2025 08:33 )             37.3     PT/INR - ( 10 Feb 2025 17:43 )   PT: 12.00 sec;   INR: 1.02 ratio         PTT - ( 10 Feb 2025 17:43 )  PTT:51.8 sec  INR: 1.02 ratio (02-10-25 @ 17:43)    02-11    138  |  102  |  8[L]  ----------------------------<  101[H]  4.1   |  23  |  0.8    Ca    8.5      11 Feb 2025 08:33  Mg     1.6     02-11    TPro  5.6[L]  /  Alb  3.3[L]  /  TBili  0.3  /  DBili  x   /  AST  12  /  ALT  <5  /  AlkPhos  58  02-11    LIVER FUNCTIONS - ( 11 Feb 2025 08:33 )  Alb: 3.3 g/dL / Pro: 5.6 g/dL / ALK PHOS: 58 U/L / ALT: <5 U/L / AST: 12 U/L / GGT: x                 Urinalysis with Rflx Culture (collected 02-10-25 @ 19:44)      Urinalysis Basic - ( 11 Feb 2025 08:33 )    Color: x / Appearance: x / SG: x / pH: x  Gluc: 101 mg/dL / Ketone: x  / Bili: x / Urobili: x   Blood: x / Protein: x / Nitrite: x   Leuk Esterase: x / RBC: x / WBC x   Sq Epi: x / Non Sq Epi: x / Bacteria: x                    A1C with Estimated Average Glucose Result: 5.8 % (06-01-24 @ 04:43)          RADIOLOGY, ECG, & ADDITIONAL TESTS:      RECENT DIAGNOSTIC ORDERS:  C-Reactive Protein: AM Sched. Collection: 12-Feb-2025 04:30 (02-11-25 @ 16:00)  Sedimentation Rate, Erythrocyte: AM Sched. Collection: 12-Feb-2025 04:30 (02-11-25 @ 16:00)  TTE Echo Complete w/o Contrast w/ Doppler:   Transport: Stretcher-Crib  Monitor: w/o Monitor (02-11-25 @ 15:59)  Protein/Creatinine Ratio, Urine: 16:00 (02-11-25 @ 11:56)  Cyclic Citrullinated Peptide AB: 16:00 (02-11-25 @ 11:56)  Urinalysis: 16:00 (02-11-25 @ 11:56)  Rheumatoid Factor Quant, Serum or Plasma: 16:00 (02-11-25 @ 11:56)  Sjogren's Syndrome Antibodies: 16:00 (02-11-25 @ 11:56)  GALLITO Antibody Screening Test: 16:00 (02-11-25 @ 11:56)  C4 Complement, Serum: 16:00 (02-11-25 @ 11:56)  C3 Complement, Serum: 16:00 (02-11-25 @ 11:56)  Double Stranded DNA Antibody: 16:00 (02-11-25 @ 11:56)  Anti-Nuclear Antibody: 16:00 (02-11-25 @ 11:56)  Magnesium: AM Sched. Collection: 12-Feb-2025 04:30 (02-11-25 @ 08:32)  Comprehensive Metabolic Panel: AM Sched. Collection: 12-Feb-2025 04:30 (02-11-25 @ 08:32)  Complete Blood Count + Automated Diff: AM Sched. Collection: 12-Feb-2025 04:30 (02-11-25 @ 08:32)  Diet, DASH/TLC:   Sodium & Cholesterol Restricted (02-11-25 @ 03:08)  Culture - Blood: AM Sched. Collection:11-Feb-2025 04:30  Specimen Source: Blood (02-11-25 @ 03:08)  Procalcitonin: AM Sched. Collection: 11-Feb-2025 04:30 (02-10-25 @ 23:50)  C-Reactive Protein: STAT (02-10-25 @ 18:52)  Sedimentation Rate, Erythrocyte: STAT (02-10-25 @ 18:52)  POCUS ED TTE 2D F/U, Limited w/o Cont.: Urgent   Indication: sob  Transport: Portable  Exam Completed (02-10-25 @ 18:51)  Culture - Blood: Routine  Specimen Source: Blood-Peripheral  Addl Info: Peripheral Site 2 (02-10-25 @ 16:55)  Culture - Blood: Routine  Specimen Source: Blood-Peripheral  Addl Info: Peripheral Site 1 (02-10-25 @ 16:55)      MEDICATIONS:  MEDICATIONS  (STANDING):  albuterol/ipratropium for Nebulization 3 milliLiter(s) Nebulizer every 6 hours  albuterol/ipratropium for Nebulization. 3 milliLiter(s) Nebulizer once  cholecalciferol 2000 Unit(s) Oral daily  colchicine 0.6 milliGRAM(s) Oral two times a day  enoxaparin Injectable 120 milliGRAM(s) SubCutaneous every 24 hours  folic acid 1 milliGRAM(s) Oral daily  metoprolol succinate  milliGRAM(s) Oral daily  mycophenolate mofetil 500 milliGRAM(s) Oral two times a day  polyethylene glycol 3350 17 Gram(s) Oral daily  senna 2 Tablet(s) Oral at bedtime    MEDICATIONS  (PRN):  albuterol    90 MICROgram(s) HFA Inhaler 2 Puff(s) Inhalation every 6 hours PRN Shortness of Breath and/or Wheezing  ALPRAZolam 1 milliGRAM(s) Oral every 8 hours PRN for anxiety  metoclopramide 5 milliGRAM(s) Oral daily PRN for nausea  morphine  IR 30 milliGRAM(s) Oral every 3 hours PRN Severe Pain (7 - 10)      HOME MEDICATIONS:  albuterol 90 mcg/inh inhalation aerosol (02-11)  alendronate 70 mg oral tablet (02-11)  cholecalciferol 125 mcg (5000 intl units) oral tablet (02-11)  colchicine 0.6 mg oral tablet (02-11)  folic acid 1 mg oral tablet (02-11)  Lasix 20 mg oral tablet (02-11)  Lovenox 100 mg/mL injectable solution (02-11)  Metoprolol Succinate  mg oral tablet, extended release (02-11)  morphine 30 mg oral tablet (02-11)  mycophenolate mofetil 250 mg oral capsule (02-11)  naloxone 4 mg/0.1 mL nasal spray (02-11)  Reglan 5 mg oral tablet (02-11)  Xanax 1 mg oral tablet (02-11)      PHYSICAL EXAM:  GENERAL: Patient lying on bed, comfoprtable   CHEST/LUNG: NVB, wheezing positive   HEART: R1+R2, RRR  ABDOMEN: Soft. non tender, BS positive  EXTREMITIES:  no edema, Bruising  CNS: AAAx4. No cranial nerves deficit.       
ILEANA MCNEIL  54y, Female  Allergy: Arava (Anaphylaxis; Angioedema)  Plaquenil (Other)  Vantin (Other (Mild))  adhesives (Rash; Urticaria)  Avelox (Other)      LOS  3d    CHIEF COMPLAINT: fevers  (13 Feb 2025 10:37)      INTERVAL EVENTS/HPI  - No acute events overnight  - T(F): , Max: 98.4 (02-13-25 @ 07:00)  - Denies any worsening symptoms  - Tolerating medication  - WBC Count: 6.18 (02-13-25 @ 06:33)  WBC Count: 5.88 (02-12-25 @ 04:30)     - Creatinine: 0.8 (02-13-25 @ 06:33)  Creatinine: 0.8 (02-12-25 @ 04:30)       ROS  General: Denies rigors, nightsweats  HEENT: Denies headache, rhinorrhea, sore throat, eye pain  CV: Denies CP, palpitations  PULM: Denies wheezing, hemoptysis  GI: Denies hematemesis, hematochezia, melena  : Denies discharge, hematuria  MSK: Denies arthralgias, myalgias  SKIN: Denies rash, lesions  NEURO: Denies paresthesias, weakness  PSYCH: Denies depression, anxiety    VITALS:  T(F): 98.4, Max: 98.4 (02-13-25 @ 07:00)  HR: 70  BP: 135/73  RR: 18Vital Signs Last 24 Hrs  T(C): 36.9 (13 Feb 2025 07:00), Max: 36.9 (13 Feb 2025 07:00)  T(F): 98.4 (13 Feb 2025 07:00), Max: 98.4 (13 Feb 2025 07:00)  HR: 70 (13 Feb 2025 07:00) (66 - 72)  BP: 135/73 (13 Feb 2025 07:00) (118/66 - 139/85)  BP(mean): --  RR: 18 (13 Feb 2025 07:00) (18 - 18)  SpO2: 97% (13 Feb 2025 07:00) (97% - 98%)    Parameters below as of 13 Feb 2025 07:00  Patient On (Oxygen Delivery Method): room air        PHYSICAL EXAM:  Gen: NAD, resting in bed  HEENT: Normocephalic, atraumatic  Neck: supple, no lymphadenopathy  CV: Regular rate & regular rhythm  Lungs: decreased BS at bases, no fremitus  Abdomen: Soft, BS present  Ext: Warm, well perfused  Neuro: non focal, awake  Skin: no rash, no erythema  Lines: no phlebitis    FH: Non-contributory  Social Hx: Non-contributory    TESTS & MEASUREMENTS:                        13.9   6.18  )-----------( 238      ( 13 Feb 2025 06:33 )             43.0     02-13    139  |  98  |  11  ----------------------------<  95  4.0   |  26  |  0.8    Ca    9.3      13 Feb 2025 06:33  Mg     1.8     02-12    TPro  6.6  /  Alb  3.9  /  TBili  0.3  /  DBili  x   /  AST  12  /  ALT  5   /  AlkPhos  85  02-13      LIVER FUNCTIONS - ( 13 Feb 2025 06:33 )  Alb: 3.9 g/dL / Pro: 6.6 g/dL / ALK PHOS: 85 U/L / ALT: 5 U/L / AST: 12 U/L / GGT: x           Urinalysis Basic - ( 13 Feb 2025 06:33 )    Color: x / Appearance: x / SG: x / pH: x  Gluc: 95 mg/dL / Ketone: x  / Bili: x / Urobili: x   Blood: x / Protein: x / Nitrite: x   Leuk Esterase: x / RBC: x / WBC x   Sq Epi: x / Non Sq Epi: x / Bacteria: x        Culture - Blood (collected 02-11-25 @ 08:33)  Source: .Blood BLOOD  Preliminary Report (02-12-25 @ 17:01):    No growth at 24 hours    Urinalysis with Rflx Culture (collected 02-10-25 @ 19:44)    Culture - Blood (collected 02-10-25 @ 17:44)  Source: .Blood BLOOD  Preliminary Report (02-12-25 @ 23:08):    No growth at 48 Hours    Culture - Blood (collected 02-10-25 @ 17:43)  Source: .Blood BLOOD  Preliminary Report (02-12-25 @ 23:08):    No growth at 48 Hours        Lactate, Blood: 1.2 mmol/L (02-10-25 @ 17:43)  Blood Gas Venous - Lactate: 1.2 mmol/L (02-10-25 @ 17:43)      INFECTIOUS DISEASES TESTING  Procalcitonin: 0.04 (02-11-25 @ 08:33)  Vancomycin Level, Trough: 16.7 (06-13-24 @ 16:37)  MRSA PCR Result.: Negative (06-12-24 @ 10:20)  Streptococcus pneumoniae Ag, Ur Result: Negative (06-12-24 @ 10:20)  Legionella Antigen, Urine: Negative (06-12-24 @ 10:20)  Procalcitonin: 0.09 (06-12-24 @ 08:29)  Vancomycin Level, Random: 27.3 (06-12-24 @ 05:59)  MRSA PCR Result.: Negative (06-02-24 @ 08:45)  Vancomycin Level, Trough: 14.9 (06-01-24 @ 16:00)  Procalcitonin: 0.14 (05-31-24 @ 16:41)  Fungitell: <31 (05-31-24 @ 16:41)  Procalcitonin: 0.25 (05-30-24 @ 15:53)  Rapid RVP Result: NotDetec (05-30-24 @ 07:12)  HIV-1/2 Combo Result: Nonreact (05-23-24 @ 11:50)  MRSA PCR Result.: Negative (05-20-24 @ 23:15)  Legionella Antigen, Urine: Negative (05-20-24 @ 16:53)  Streptococcus pneumoniae Ag, Ur Result: Negative (05-20-24 @ 16:53)  Procalcitonin: 0.06 (05-20-24 @ 06:31)      INFLAMMATORY MARKERS  Sedimentation Rate, Erythrocyte: 46 mm/Hr (02-12-25 @ 04:30)  C-Reactive Protein: 41.2 mg/L (02-12-25 @ 04:30)      RADIOLOGY & ADDITIONAL TESTS:  I have personally reviewed the last available Chest xray  CXR      CT      CARDIOLOGY TESTING  12 Lead ECG:   Ventricular Rate 91 BPM    Atrial Rate 91 BPM    P-R Interval 190 ms    QRS Duration 76 ms    Q-T Interval 344 ms    QTC Calculation(Bazett) 423 ms    P Axis 73 degrees    R Axis 72 degrees    T Axis 61 degrees    Diagnosis Line Normal sinus rhythm  Nonspecific ST abnormality  Abnormal ECG    Confirmed by Raymon Mark ([id number]) on 2/11/2025 9:40:55 PM (02-10-25 @ 17:29)      MEDICATIONS  albuterol/ipratropium for Nebulization 3 Nebulizer every 6 hours  albuterol/ipratropium for Nebulization. 3 Nebulizer once  cholecalciferol 2000 Oral daily  colchicine 0.6 Oral two times a day  enoxaparin Injectable 120 SubCutaneous every 24 hours  folic acid 1 Oral daily  metoprolol succinate  Oral daily  mycophenolate mofetil 500 Oral two times a day  polyethylene glycol 3350 17 Oral daily  predniSONE   Tablet 40 Oral daily  senna 2 Oral at bedtime      WEIGHT  Weight (kg): 72.6 (02-10-25 @ 17:52)  Creatinine: 0.8 mg/dL (02-13-25 @ 06:33)      ANTIBIOTICS:      All available historical records have been reviewed      
SUBJECTIVE/OVERNIGHT EVENTS  Today is hospital day 2d. This morning patient was seen and examined at bedside, resting comfortably in bed. No acute or major events overnight. Pt has been afebrile, off abx.     PMH    Fever due to unspecified condition    H/o or current diagnosis of HF- ACEI/ARB contraindication unknown    Family history of cervical cancer    FH: thyroid cancer    Handoff    MEWS Score    Lupus    AICD (automatic cardioverter/defibrillator) present    Throat cancer    RA (rheumatoid arthritis)    HTN (hypertension)    CHF (congestive heart failure)    COPD (chronic obstructive pulmonary disease)    DVT (deep venous thrombosis)    Pulmonary embolus    Pulmonary embolism    History of laryngeal cancer    GERD (gastroesophageal reflux disease)    Cervical cancer    APS (antiphospholipid syndrome)    Fever    S/P appendectomy    S/P cholecystectomy    History of laryngeal cancer    AICD (automatic cardioverter/defibrillator) present    S/P hysterectomy    History of back surgery    H/O foot surgery    S/P eye surgery    Subclavian arterial stenosis    PNEUMONIA AND RSV    90+    [email or web address]        MEDICATIONS  STANDING MEDICATIONS  albuterol/ipratropium for Nebulization 3 milliLiter(s) Nebulizer every 6 hours  albuterol/ipratropium for Nebulization. 3 milliLiter(s) Nebulizer once  cholecalciferol 2000 Unit(s) Oral daily  colchicine 0.6 milliGRAM(s) Oral two times a day  enoxaparin Injectable 120 milliGRAM(s) SubCutaneous every 24 hours  folic acid 1 milliGRAM(s) Oral daily  metoprolol succinate  milliGRAM(s) Oral daily  mycophenolate mofetil 500 milliGRAM(s) Oral two times a day  polyethylene glycol 3350 17 Gram(s) Oral daily  senna 2 Tablet(s) Oral at bedtime    PRN MEDICATIONS  albuterol    90 MICROgram(s) HFA Inhaler 2 Puff(s) Inhalation every 6 hours PRN  ALPRAZolam 1 milliGRAM(s) Oral every 8 hours PRN  metoclopramide 5 milliGRAM(s) Oral daily PRN  morphine  IR 30 milliGRAM(s) Oral every 3 hours PRN    VITALS  T(F): 98.7 (02-12-25 @ 07:30), Max: 98.9 (02-11-25 @ 22:46)  HR: 74 (02-12-25 @ 07:30) (74 - 93)  BP: 122/71 (02-12-25 @ 07:30) (122/71 - 152/80)  RR: 18 (02-11-25 @ 22:46) (18 - 18)  SpO2: 95% (02-12-25 @ 07:30) (95% - 97%)    PHYSICAL EXAM  GENERAL: NAD, lying in bed comfortably  HEAD:  Atraumatic, normocephalic  EYES: EOMI, PERRL  LUNGS: Unlabored respirations.+ wheezing  ABDOMEN: Soft, nontender, nondistended, +BS  EXTREMITIES: 2+ peripheral pulses bilaterally. No clubbing, cyanosis, or edema  NERVOUS SYSTEM:  A&Ox3, moving all extremities, no focal deficits     LABS             13.6   5.88  )-----------( 177      ( 02-12-25 @ 04:30 )             42.1     139  |  98  |  9   -------------------------<  110   02-12-25 @ 04:30  4.1  |  31  |  0.8    Ca      9.0     02-12-25 @ 04:30  Mg     1.8     02-12-25 @ 04:30    TPro  6.6  /  Alb  4.0  /  TBili  0.3  /  DBili  x   /  AST  14  /  ALT  7   /  AlkPhos  72  /  GGT  x     02-12-25 @ 04:30    PT/INR - ( 02-10-25 @ 17:43 )   PT: 12.00 sec;   INR: 1.02 ratio  PTT - ( 02-10-25 @ 17:43 )  PTT:51.8 sec    Troponin T, High Sensitivity Result: 8 ng/L (02-10-25 @ 17:43)  Pro-Brain Natriuretic Peptide: 118 pg/mL (02-10-25 @ 17:43)    Urinalysis Basic - ( 12 Feb 2025 04:30 )    Color: x / Appearance: x / SG: x / pH: x  Gluc: 110 mg/dL / Ketone: x  / Bili: x / Urobili: x   Blood: x / Protein: x / Nitrite: x   Leuk Esterase: x / RBC: x / WBC x   Sq Epi: x / Non Sq Epi: x / Bacteria: x          Urinalysis with Rflx Culture (collected 10 Feb 2025 19:44)    Culture - Blood (collected 10 Feb 2025 17:44)  Source: .Blood BLOOD  Preliminary Report (11 Feb 2025 23:02):    No growth at 24 hours    Culture - Blood (collected 10 Feb 2025 17:43)  Source: .Blood BLOOD  Preliminary Report (11 Feb 2025 23:02):    No growth at 24 hours              
ILEANA MCNEIL  54y, Female  Allergy: Arava (Anaphylaxis; Angioedema)  Plaquenil (Other)  Vantin (Other (Mild))  adhesives (Rash; Urticaria)  Avelox (Other)      LOS  2d    CHIEF COMPLAINT: fever (12 Feb 2025 13:22)      INTERVAL EVENTS/HPI  - No acute events overnight, Afebrile , still feels weak, no localizing symptoms   - T(F): , Max: 98.9 (02-11-25 @ 22:46)  - Denies any worsening symptoms  - Tolerating medication  - WBC Count: 5.88 (02-12-25 @ 04:30)  WBC Count: 4.70 (02-11-25 @ 08:33)     - Creatinine: 0.8 (02-12-25 @ 04:30)  Creatinine: 0.8 (02-11-25 @ 08:33)       ROS  General: Denies rigors, nightsweats  HEENT: Denies headache, rhinorrhea, sore throat, eye pain  CV: Denies CP, palpitations  PULM: Denies wheezing, hemoptysis  GI: Denies hematemesis, hematochezia, melena  : Denies discharge, hematuria  MSK: Denies arthralgias, myalgias  SKIN: Denies rash, lesions  NEURO: Denies paresthesias, weakness  PSYCH: Denies depression, anxiety    VITALS:  T(F): 98.7, Max: 98.9 (02-11-25 @ 22:46)  HR: 74  BP: 122/71  RR: 18Vital Signs Last 24 Hrs  T(C): 37.1 (12 Feb 2025 07:30), Max: 37.2 (11 Feb 2025 22:46)  T(F): 98.7 (12 Feb 2025 07:30), Max: 98.9 (11 Feb 2025 22:46)  HR: 74 (12 Feb 2025 07:30) (74 - 93)  BP: 122/71 (12 Feb 2025 07:30) (122/71 - 152/80)  BP(mean): --  RR: 18 (11 Feb 2025 22:46) (18 - 18)  SpO2: 95% (12 Feb 2025 07:30) (95% - 97%)    Parameters below as of 11 Feb 2025 22:46  Patient On (Oxygen Delivery Method): nasal cannula  O2 Flow (L/min): 2      PHYSICAL EXAM:  Gen: NAD, resting in bed NC  HEENT: Normocephalic, atraumatic  Neck: supple, no lymphadenopathy  CV: Regular rate & regular rhythm  Lungs: decreased BS at bases, no fremitus  Abdomen: Soft, BS present  Ext: Warm, well perfused  Neuro: non focal, awake  Skin: no rash, no erythema  Lines: no phlebitis    FH: Non-contributory  Social Hx: Non-contributory    TESTS & MEASUREMENTS:                        13.6   5.88  )-----------( 177      ( 12 Feb 2025 04:30 )             42.1     02-12    139  |  98  |  9[L]  ----------------------------<  110[H]  4.1   |  31  |  0.8    Ca    9.0      12 Feb 2025 04:30  Mg     1.8     02-12    TPro  6.6  /  Alb  4.0  /  TBili  0.3  /  DBili  x   /  AST  14  /  ALT  7   /  AlkPhos  72  02-12      LIVER FUNCTIONS - ( 12 Feb 2025 04:30 )  Alb: 4.0 g/dL / Pro: 6.6 g/dL / ALK PHOS: 72 U/L / ALT: 7 U/L / AST: 14 U/L / GGT: x           Urinalysis Basic - ( 12 Feb 2025 04:30 )    Color: x / Appearance: x / SG: x / pH: x  Gluc: 110 mg/dL / Ketone: x  / Bili: x / Urobili: x   Blood: x / Protein: x / Nitrite: x   Leuk Esterase: x / RBC: x / WBC x   Sq Epi: x / Non Sq Epi: x / Bacteria: x        Urinalysis with Rflx Culture (collected 02-10-25 @ 19:44)    Culture - Blood (collected 02-10-25 @ 17:44)  Source: .Blood BLOOD  Preliminary Report (02-11-25 @ 23:02):    No growth at 24 hours    Culture - Blood (collected 02-10-25 @ 17:43)  Source: .Blood BLOOD  Preliminary Report (02-11-25 @ 23:02):    No growth at 24 hours        Lactate, Blood: 1.2 mmol/L (02-10-25 @ 17:43)  Blood Gas Venous - Lactate: 1.2 mmol/L (02-10-25 @ 17:43)      INFECTIOUS DISEASES TESTING  Procalcitonin: 0.04 (02-11-25 @ 08:33)  Vancomycin Level, Trough: 16.7 (06-13-24 @ 16:37)  MRSA PCR Result.: Negative (06-12-24 @ 10:20)  Streptococcus pneumoniae Ag, Ur Result: Negative (06-12-24 @ 10:20)  Legionella Antigen, Urine: Negative (06-12-24 @ 10:20)  Procalcitonin: 0.09 (06-12-24 @ 08:29)  Vancomycin Level, Random: 27.3 (06-12-24 @ 05:59)  MRSA PCR Result.: Negative (06-02-24 @ 08:45)  Vancomycin Level, Trough: 14.9 (06-01-24 @ 16:00)  Procalcitonin: 0.14 (05-31-24 @ 16:41)  Fungitell: <31 (05-31-24 @ 16:41)  Procalcitonin: 0.25 (05-30-24 @ 15:53)  Rapid RVP Result: NotDetec (05-30-24 @ 07:12)  HIV-1/2 Combo Result: Nonreact (05-23-24 @ 11:50)  MRSA PCR Result.: Negative (05-20-24 @ 23:15)  Legionella Antigen, Urine: Negative (05-20-24 @ 16:53)  Streptococcus pneumoniae Ag, Ur Result: Negative (05-20-24 @ 16:53)  Procalcitonin: 0.06 (05-20-24 @ 06:31)      INFLAMMATORY MARKERS  Sedimentation Rate, Erythrocyte: 46 mm/Hr (02-12-25 @ 04:30)  C-Reactive Protein: 41.2 mg/L (02-12-25 @ 04:30)      RADIOLOGY & ADDITIONAL TESTS:  I have personally reviewed the last available Chest xray  CXR      CT      CARDIOLOGY TESTING  12 Lead ECG:   Ventricular Rate 91 BPM    Atrial Rate 91 BPM    P-R Interval 190 ms    QRS Duration 76 ms    Q-T Interval 344 ms    QTC Calculation(Bazett) 423 ms    P Axis 73 degrees    R Axis 72 degrees    T Axis 61 degrees    Diagnosis Line Normal sinus rhythm  Nonspecific ST abnormality  Abnormal ECG    Confirmed by Raymon Mark ([id number]) on 2/11/2025 9:40:55 PM (02-10-25 @ 17:29)      MEDICATIONS  albuterol/ipratropium for Nebulization 3 Nebulizer every 6 hours  albuterol/ipratropium for Nebulization. 3 Nebulizer once  cholecalciferol 2000 Oral daily  colchicine 0.6 Oral two times a day  enoxaparin Injectable 120 SubCutaneous every 24 hours  folic acid 1 Oral daily  metoprolol succinate  Oral daily  mycophenolate mofetil 500 Oral two times a day  polyethylene glycol 3350 17 Oral daily  senna 2 Oral at bedtime      WEIGHT  Weight (kg): 72.6 (02-10-25 @ 17:52)  Creatinine: 0.8 mg/dL (02-12-25 @ 04:30)      ANTIBIOTICS:      All available historical records have been reviewed

## 2025-02-13 NOTE — DISCHARGE NOTE PROVIDER - PROVIDER TOKENS
PROVIDER:[TOKEN:[[id number]],FOLLOWUP:[1 week]],PROVIDER:[TOKEN:[[id number]],FOLLOWUP:[1 week]],PROVIDER:[TOKEN:[[id number]],FOLLOWUP:[1 week]]

## 2025-02-13 NOTE — DISCHARGE NOTE NURSING/CASE MANAGEMENT/SOCIAL WORK - FINANCIAL ASSISTANCE
[facility] provides services at a reduced cost to those who are determined to be eligible through [facility]’s financial assistance program. Information regarding [facility]’s financial assistance program can be found by going to [email or web address] or by calling [phone].

## 2025-02-13 NOTE — DISCHARGE NOTE PROVIDER - HOSPITAL COURSE
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 In ED T 98.5 after having taken tylenol before presenting. She was started on cef and vanc empirically and admitted. ID was consulted, recommending monitoring off abx. Abx were discontinued and the patient was monitored had no fevers while inpatient. She is being discharged with close follow up with rheum and pulm outpatient.     #Fever   #COPD on home O2 2L PRN  - fevers ongoing x1 month  - completed courses of doxycycline + levaquin, was also on Prednisone for most of the last month  - CT Chest from 1/23/2025- Compared to prior CT chest, Several bilateral pulmonary nodules measuring up to a 3 mm subpleural nodule within right lower lobe. Many of these nodules are without significant change with some that are new possibly focus of mucus plugging. Follow-up CT chest may be obtained to assess for stability. Bilateral subsegmental atelectasis/scarring.  - ID consult appreciated - rheum, monitor off abx, ECHO  - c/w Duonebs + Albuterol PRN  - patient reports that she takes Breztri at home, no longer uses Trelegy  - blood culture negative x2  - ESR 46, CRP 41.2   - f/u rheum outpt    #RA, SLE/triple (+)APLS on Lovenox (due to prior ?ICH on warfarin)  #Hx of DVT/PE  #cervical CA s/p hysterectomy  #laryngeal CA s/p chemo/rad  #Immunocompromised state - d/t autoimmune disease, steroid use  - c/w Lovenox 120mg qhs  - c/w mycophenolate mofetil 500 mg PO BID   - c/w folic acid 1mg qd   - c/w vitamin D 5000U PO qd  - Rheum consult follows with Dr. Rivera, follow recommendations     #Chronic pericarditis secondary to most likely SLE  - c/w colchicine 0.6mg PO BID   - ECHO 55-60%    #Chronic back pain  #Chronic opioid use  - c/w morphine ER 30mg q6h standing (recently decreased)  - naloxone PRN  - bowel regimen with miralax and senna    #NSTEMI 2015  #HFpEF, 6/5/24 TTE EF 65%   #s/p PPM/ICD  #subclavian stenosis s/p stent  - c/w home lasix  - c/w metoprolol ER 100mg qd     #HTN  #HLD  - c/w metoprolol ER 100mg qd     Discussion of discharge plan of care, including discharge diagnoses, medication reconciliation, and follow-ups was conducted with Dr. Michaud on 2/13/2025, and discharge was approved.     Medicine attending -    Patient seen and examined this morning  lying comfortably in bed  off oxygen  lungs sound clearer today  afebrile for 48hrs off abx  cultures negative  patient asking to go home and sleep in her bed  states she feels better  Her pulm and rheum are on vacation until next Tuesday and patient stated she has follow up susi for next week with both    Vital Signs Last 24 Hrs  T(C): 36.9 (13 Feb 2025 07:00), Max: 36.9 (13 Feb 2025 07:00)  T(F): 98.4 (13 Feb 2025 07:00), Max: 98.4 (13 Feb 2025 07:00)  HR: 70 (13 Feb 2025 07:00) (66 - 72)  BP: 135/73 (13 Feb 2025 07:00) (118/66 - 139/85)  BP(mean): --  RR: 18 (13 Feb 2025 07:00) (18 - 18)  SpO2: 97% (13 Feb 2025 07:00) (97% - 98%)    Parameters below as of 13 Feb 2025 07:00  Patient On (Oxygen Delivery Method): room air    54 year old F with with extensive PMHx, including HTN, HLD, HFpEF (EF 65% June 2024), RA, 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 In ED T 98.5 after having taken tylenol before presenting. She was started on cef and vanc empirically and admitted. ID was consulted, recommending monitoring off abx. Abx were discontinued and the patient was monitored had no fevers while inpatient. She is being discharged with close follow up with rheum and pulm outpatient.     #Fever   #COPD on home O2 2L PRN  - fevers ongoing x1 month  - completed courses of doxycycline + levaquin, was also on Prednisone for most of the last month  - CT Chest from 1/23/2025- Compared to prior CT chest, Several bilateral pulmonary nodules measuring up to a 3 mm subpleural nodule within right lower lobe. Many of these nodules are without significant change with some that are new possibly focus of mucus plugging. Follow-up CT chest may be obtained to assess for stability. Bilateral subsegmental atelectasis/scarring.  - ID consult appreciated - rheum, monitor off abx, ECHO  - c/w Duonebs + Albuterol PRN  - patient reports that she takes Breztri at home, no longer uses Trelegy  - blood culture negative x2  - ESR 46, CRP 41.2   - f/u rheum outpt    #RA, SLE/triple (+)APLS on Lovenox (due to prior ?ICH on warfarin)  #Hx of DVT/PE  #cervical CA s/p hysterectomy  #laryngeal CA s/p chemo/rad  #Immunocompromised state - d/t autoimmune disease, steroid use  - c/w Lovenox 120mg qhs  - c/w mycophenolate mofetil 500 mg PO BID   - c/w folic acid 1mg qd   - c/w vitamin D 5000U PO qd  - Rheum consult follows with Dr. Rivera who is on vacation - has follow up susi for next week     #Chronic pericarditis secondary to most likely SLE  - c/w colchicine 0.6mg PO BID   - ECHO 55-60%    #Chronic back pain  #Chronic opioid use  - c/w morphine ER 30mg q6h standing (recently decreased)  - naloxone PRN  - bowel regimen with miralax and senna    #NSTEMI 2015  #HFpEF, 6/5/24 TTE EF 65%   #s/p PPM/ICD  #subclavian stenosis s/p stent  - c/w home lasix  - c/w metoprolol ER 100mg qd     #HTN  #HLD  - c/w metoprolol ER 100mg qd     Discussion of discharge plan of care, including discharge diagnoses, medication reconciliation, and follow-ups was conducted with Dr. Michaud on 2/13/2025, and discharge was approved.    D/C today; d/c planning took over 75 min  d/c papers edited by me  discussed d/c plan and all instructions in detail

## 2025-02-13 NOTE — DISCHARGE NOTE NURSING/CASE MANAGEMENT/SOCIAL WORK - NSDCPEFALRISK_GEN_ALL_CORE
SW contacted pt by phone to follow-up on APN referral. Pt answered and consented to the call.    SW reviewed referral information and pt confirmed her interest in working with a therapist. Pt was previously connected with a provider, but she now has a private commercial insurance as her primary coverage. SW reviewed various options for connecting with a provider, such as accessing a therapist in private practice using her insurance plan or through EAP if her employer has a BH benefit. SW also reviewed [facility] as pt feels that her counseling needs have been impacted by the  experience. Pt is aware that SW can send a referral on her behalf if desired. Pt would like some time to consider these options and consented to a portal message where SW will review the information discussed today. Pt did not have any other questions or needs at this time. She declined to receive any support group information or the  mom's line. SW welcomed future contact any time if pt has questions or needs, including the referral to [facility]. Pt agreed.     Portal message sent. Referral is complete. SW will remain available for support if pt makes contact.   
For information on Fall & Injury Prevention, visit: [email or web address] OR  [email or web address] OR  https://www.cdc.gov/steadi/patient.html

## 2025-02-13 NOTE — PROGRESS NOTE ADULT - ASSESSMENT
#Fever   #COPD on home O2 2L PRN  - fevers ongoing x1 month  - completed courses of doxycycline + levaquin, was also on Prednisone for most of the last month  - CT Chest from 1/23/2025- Compared to prior CT chest, Several bilateral pulmonary nodules measuring up to a 3 mm subpleural nodule within right lower lobe. Many of these nodules are without significant change with some that are new possibly focus of mucus plugging. Follow-up CT chest may be obtained to assess for stability. Bilateral subsegmental atelectasis/scarring.  - ID consult appreciated - rheum, monitor off abx, ECHO  - c/w Duonebs + Albuterol PRN  - patient reports that she takes Breztri at home, no longer uses Trelegy  - blood culture negative x2  - ESR 46, CRP 41.2   - f/u rheum  - f/u ECHO    #RA, SLE/triple (+)APLS on Lovenox (due to prior ?ICH on warfarin)  #Hx of DVT/PE  #cervical CA s/p hysterectomy  #laryngeal CA s/p chemo/rad  #Immunocompromised state - d/t autoimmune disease, steroid use  - c/w Lovenox 120mg qhs  - c/w mycophenolate mofetil 500 mg PO BID   - c/w folic acid 1mg qd   - c/w vitamin D 5000U PO qd  - Rheum consult follows with Dr. Rivera, follow recommendations     #Chronic pericarditis secondary to most likely SLE  - c/w colchicine 0.6mg PO BID   follow echo     #Chronic back pain  #Chronic opioid use  - c/w morphine ER 30mg q6h standing (recently decreased)  - naloxone PRN  - bowel regimen with miralax and senna    #NSTEMI 2015  #HFpEF, 6/5/24 TTE EF 65%   #s/p PPM/ICD  #subclavian stenosis s/p stent  - c/w home lasix  - c/w metoprolol ER 100mg qd     #HTN  #HLD  - c/w metoprolol ER 100mg qd   
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 Pt has been treated at home for RSV and pneumonia (completed courses of both Levaquin and Doxycycline) by urgent care, PCP, and by Dr. Dumont over the past month but has continued to have intermittent fever. Over the past week, the fever has become persistent, stating it is often as high as 104. She took tylenol immediately before coming to the ED, but no other new medications.     #Fever   #COPD on home O2 2L PRN  - fevers ongoing x1 month  - completed courses of doxycycline + levaquin, was also on Prednisone for most of the last month  - patient has wheezing on exam   - CT Chest from 1/23/2025- Compared to prior CT chest, Several bilateral pulmonary nodules measuring up to a 3 mm subpleural nodule within right lower lobe. Many of these nodules are without significant change with some that are new possibly focus of mucus plugging. Follow-up CT chest may be obtained to assess for stability. Bilateral subsegmental atelectasis/scarring.  - Vanc + Meropenem  - ID consult,   - c/w Duonebs + Albuterol PRN  - patient reports that she takes Breztri at home, no longer uses Trelegy  - f/u BCx, ESR, CRP, Procalcitonin    #RA, SLE/triple (+)APLS on Lovenox (due to prior ?ICH on warfarin)  #Hx of DVT/PE  #cervical CA s/p hysterectomy  #laryngeal CA s/p chemo/rad  #Immunocompromised state - d/t autoimmune disease, steroid use  - c/w Lovenox 120mg qhs  - c/w mycophenolate mofetil 500 mg PO BID   - c/w folic acid 1mg qd   - c/w vitamin D 5000U PO qd  - Rheum consult follows with Dr. Rivera, follow recommendations     #Chronic pericarditis secondary to most likely SLE  - c/w colchicine 0.6mg PO BID   follow echo     #Chronic back pain  #Chronic opioid use  - c/w morphine ER 30mg q6h standing (recently decreased)  - naloxone PRN  - bowel regimen with miralax and senna    #NSTEMI 2015  #HFpEF, 6/5/24 TTE EF 65%   #s/p PPM/ICD  #subclavian stenosis s/p stent  - c/w home lasix  - c/w metoprolol ER 100mg qd     #HTN  #HLD  - c/w metoprolol ER 100mg qd         follow up:  follow ID, rheumatology. still has some wheezing. monitor for hypoxia. ( pulm consult if worsening hypoxia).  also need OP pulm follow up ,   monitor for fever spikes  follow ESR crp, rheumatology work up ordered, follow up     - CT Chest from 1/23/2025- Compared to prior CT chest, Several bilateral pulmonary nodules measuring up to a 3 mm subpleural nodule within right lower lobe. Many of these nodules are without significant change with some that are new possibly focus of mucus plugging. Follow-up CT chest may be obtained to assess for stability. Bilateral subsegmental atelectasis/scarring.  OP pulm follow up vs inpatient if worsening symptoms or hypoxia. 
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 Pt has been treated at home for RSV and pneumonia (completed courses of both Levaquin and Doxycycline) by urgent care, PCP, and by Dr. Dumont over the past month but has continued to have intermittent fever.     IMPRESSION  #Fevers at home    Afebrile     No leukocytosis Admission WBC 4    2/10 BCX NGTD x2   Procalcitonin: 0.04 (02-11-25 @ 08:33)    UA without significant pyuria     CXR: No radiographic evidence of acute cardiopulmonary disease.    CT 1/23- Several bilateral pulmonary nodules measuring up to a 3 mm subpleural nodule within right lower lobe. Many of these nodules are without significant change with some that are new possibly focus of mucus plugging. Atelectasis  #RA, SLE/triple (+) APLS on cellcept   #AICD, PPM  #Hx cervical/laryngeal ca  #COPD home O2  #Immunodeficiency secondary to above comorbidities which could result in poor clinical outcome   #Abx allergy:   Avelox (Other)- cardiac rhythm issues  Vantin (Other (Mild))- swelling?    Creatinine: 1.0 (02-10-25 @ 17:43)    Height (cm): 157.5 (06-26-24 @ 18:44)  Weight (kg): 72.6 (02-10-25 @ 17:52)    RECOMMENDATIONS  - No clear infectious etiology  - f/u Rheum markers   - > 1 mo of fever more concerning for Rheumatologic etiology than infectious. Hx extensive infectious workup  - Monitor off antimicrobials   - TTE    If any questions, please send a message or call on [employer] Teams  Please continue to update ID with any pertinent new laboratory, radiographic findings, or change in clinical status      
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 Pt has been treated at home for RSV and pneumonia (completed courses of both Levaquin and Doxycycline) by urgent care, PCP, and by Dr. Dumont over the past month but has continued to have intermittent fever.     IMPRESSION  #Fevers at home    Afebrile     No leukocytosis Admission WBC 4    2/10 BCX NGTD x2   Procalcitonin: 0.04 (02-11-25 @ 08:33)    UA without significant pyuria     CXR: No radiographic evidence of acute cardiopulmonary disease.    CT 1/23- Several bilateral pulmonary nodules measuring up to a 3 mm subpleural nodule within right lower lobe. Many of these nodules are without significant change with some that are new possibly focus of mucus plugging. Atelectasis  #RA, SLE/triple (+) APLS on cellcept   #AICD, PPM  #Hx cervical/laryngeal ca  #COPD home O2  #Immunodeficiency secondary to above comorbidities which could result in poor clinical outcome   #Abx allergy:   Avelox (Other)- cardiac rhythm issues  Vantin (Other (Mild))- swelling?    Creatinine: 1.0 (02-10-25 @ 17:43)    Height (cm): 157.5 (06-26-24 @ 18:44)  Weight (kg): 72.6 (02-10-25 @ 17:52)    RECOMMENDATIONS  - No clear infectious etiology  - f/u Rheum markers   - > 1 mo of fever more concerning for Rheumatologic etiology than infectious. Hx extensive infectious workup  - Monitor off antimicrobials   - TTE    If any questions, please send a message or call on [email or web address] Teams  Please continue to update ID with any pertinent new laboratory, radiographic findings, or change in clinical status

## 2025-02-13 NOTE — DISCHARGE NOTE PROVIDER - NSDCFUSCHEDAPPT_GEN_ALL_CORE_FT
Jazlyn Haque  [facility]  GENSURG [address]  Scheduled Appointment: 02/20/2025    Andre Hurst  [facility]  PULMMED [address]  Scheduled Appointment: 02/21/2025    Ronnell Oviedo  [facility]  OTOLARYNG [address]  Scheduled Appointment: 02/24/2025    Ofe Victor  [facility]  RHEUM [address]  Scheduled Appointment: 03/06/2025

## 2025-02-13 NOTE — DISCHARGE NOTE PROVIDER - NSDCMRMEDTOKEN_GEN_ALL_CORE_FT
albuterol 90 mcg/inh inhalation aerosol: 2 puff(s) inhaled every 6 hours, As Needed  alendronate 70 mg oral tablet: 1 tab(s) orally on Saturdays  cholecalciferol 125 mcg (5000 intl units) oral tablet: 1 tab(s) orally once a day  colchicine 0.6 mg oral tablet: 1 tab(s) orally 2 times a day  folic acid 1 mg oral tablet: 1 tab(s) orally once a day  Lasix 20 mg oral tablet: 1 tab(s) orally once a day as needed for  lower extremity edema  Lovenox 100 mg/mL injectable solution: 120 milligram(s) subcutaneously once a day (at bedtime)  Metoprolol Succinate  mg oral tablet, extended release: 1 tab(s) orally once a day  morphine 30 mg oral tablet: 1 tab(s) orally every 3 hours  mycophenolate mofetil 250 mg oral capsule: 4 tab(s) orally 2 times a day For SLE  naloxone 4 mg/0.1 mL nasal spray: 1 spray(s) intranasally once as needed for  drowsiness For drowsiness or decrease in level of consciousness  predniSONE 10 mg oral tablet: 1 tab(s) orally starting 2/14: Take 3 tabs on day 1 and 2, take 2 tabs on day 3 and 4, take 1 tab on day 5 and 6, take half a tab on days 7 and 8  Reglan 5 mg oral tablet: 1 tab(s) orally once a day as needed for  nausea  Xanax 1 mg oral tablet: 1 tab(s) orally every 8 hours as needed for  anxiety   albuterol 90 mcg/inh inhalation aerosol: 2 puff(s) inhaled every 6 hours, As Needed  alendronate 70 mg oral tablet: 1 tab(s) orally on Saturdays  cholecalciferol 125 mcg (5000 intl units) oral tablet: 1 tab(s) orally once a day  colchicine 0.6 mg oral tablet: 1 tab(s) orally 2 times a day  folic acid 1 mg oral tablet: 1 tab(s) orally once a day  Lasix 20 mg oral tablet: 1 tab(s) orally once a day as needed for  lower extremity edema  Lovenox 100 mg/mL injectable solution: 120 milligram(s) subcutaneously once a day (at bedtime)  Metoprolol Succinate  mg oral tablet, extended release: 1 tab(s) orally once a day  morphine 30 mg oral tablet: 1 tab(s) orally every 3 hours  mycophenolate mofetil 250 mg oral capsule: 4 tab(s) orally 2 times a day For SLE  naloxone 4 mg/0.1 mL nasal spray: 1 spray(s) intranasally once as needed for  drowsiness For drowsiness or decrease in level of consciousness  predniSONE 10 mg oral tablet: 1 tab(s) orally once a day starting 2/14: Take 3 tabs on day 1 and 2, take 2 tabs on day 3 and 4, take 1 tab on day 5 and 6, take half a tab on days 7 and 8  Reglan 5 mg oral tablet: 1 tab(s) orally once a day as needed for  nausea  Xanax 1 mg oral tablet: 1 tab(s) orally every 8 hours as needed for  anxiety

## 2025-02-13 NOTE — DISCHARGE NOTE PROVIDER - NSDCCPCAREPLAN_GEN_ALL_CORE_FT
PRINCIPAL DISCHARGE DIAGNOSIS  Diagnosis: Fever  Assessment and Plan of Treatment: Fever  A fever is an increase in the body's temperature above 100.4°F (38°C) or higher. In adults and children older than three months, a brief mild or moderate fever generally has no long-term effect, and it usually does not require treatment. Many times, fevers are the result of viral infections, which are self-resolving.  However, certain symptoms or diagnostic tests may suggest a bacterial infection that may respond to antibiotics. Take medications as directed by your health care provider.  SEEK IMMEDIATE MEDICAL CARE IF YOU OR YOUR CHILD HAVE ANY OF THE FOLLOWING SYMPTOMS : shortness of breath, seizure, rash/stiff neck/headache, severe abdominal pain, persistent vomiting, any signs of dehydration, or if your child has a fever for over five (5) days.       PRINCIPAL DISCHARGE DIAGNOSIS  Diagnosis: Fever  Assessment and Plan of Treatment: Fever  A fever is an increase in the body's temperature above 100.4°F (38°C) or higher. In adults and children older than three months, a brief mild or moderate fever generally has no long-term effect, and it usually does not require treatment. Many times, fevers are the result of viral infections, which are self-resolving. We monitored you in the hospital for 48hrs and you did not have a fever. Please follow up with pulmonary and Rheumatology next week for further work up. Take medications as directed by your health care provider.

## 2025-02-13 NOTE — DISCHARGE NOTE PROVIDER - CARE PROVIDERS DIRECT ADDRESSES
[email or web address]

## 2025-02-14 LAB — ANA TITR SER: NEGATIVE — SIGNIFICANT CHANGE UP

## 2025-02-15 LAB
CULTURE RESULTS: SIGNIFICANT CHANGE UP
CULTURE RESULTS: SIGNIFICANT CHANGE UP
SPECIMEN SOURCE: SIGNIFICANT CHANGE UP
SPECIMEN SOURCE: SIGNIFICANT CHANGE UP

## 2025-02-16 LAB
CULTURE RESULTS: SIGNIFICANT CHANGE UP
SPECIMEN SOURCE: SIGNIFICANT CHANGE UP

## 2025-02-17 ENCOUNTER — APPOINTMENT (OUTPATIENT)
Age: 55
End: 2025-02-17
Payer: MEDICARE

## 2025-02-17 VITALS
RESPIRATION RATE: 16 BRPM | DIASTOLIC BLOOD PRESSURE: 98 MMHG | HEIGHT: 62 IN | SYSTOLIC BLOOD PRESSURE: 171 MMHG | BODY MASS INDEX: 29.44 KG/M2 | HEART RATE: 122 BPM | WEIGHT: 160 LBS | TEMPERATURE: 98.2 F

## 2025-02-17 PROCEDURE — 99213 OFFICE O/P EST LOW 20 MIN: CPT

## 2025-02-18 ENCOUNTER — OUTPATIENT (OUTPATIENT)
Dept: OUTPATIENT SERVICES | Facility: HOSPITAL | Age: 55
LOS: 1 days | End: 2025-02-18
Payer: MEDICARE

## 2025-02-18 ENCOUNTER — RESULT REVIEW (OUTPATIENT)
Age: 55
End: 2025-02-18

## 2025-02-18 DIAGNOSIS — Z90.49 ACQUIRED ABSENCE OF OTHER SPECIFIED PARTS OF DIGESTIVE TRACT: Chronic | ICD-10-CM

## 2025-02-18 DIAGNOSIS — Z98.890 OTHER SPECIFIED POSTPROCEDURAL STATES: Chronic | ICD-10-CM

## 2025-02-18 DIAGNOSIS — I77.1 STRICTURE OF ARTERY: Chronic | ICD-10-CM

## 2025-02-18 DIAGNOSIS — Z95.810 PRESENCE OF AUTOMATIC (IMPLANTABLE) CARDIAC DEFIBRILLATOR: Chronic | ICD-10-CM

## 2025-02-18 DIAGNOSIS — Z90.710 ACQUIRED ABSENCE OF BOTH CERVIX AND UTERUS: Chronic | ICD-10-CM

## 2025-02-18 DIAGNOSIS — D68.61 ANTIPHOSPHOLIPID SYNDROME: ICD-10-CM

## 2025-02-18 DIAGNOSIS — Z00.8 ENCOUNTER FOR OTHER GENERAL EXAMINATION: ICD-10-CM

## 2025-02-18 PROCEDURE — 74176 CT ABD & PELVIS W/O CONTRAST: CPT | Mod: 26

## 2025-02-18 PROCEDURE — 74176 CT ABD & PELVIS W/O CONTRAST: CPT

## 2025-02-19 DIAGNOSIS — D68.61 ANTIPHOSPHOLIPID SYNDROME: ICD-10-CM

## 2025-02-21 ENCOUNTER — APPOINTMENT (OUTPATIENT)
Dept: PULMONOLOGY | Facility: CLINIC | Age: 55
End: 2025-02-21
Payer: MEDICARE

## 2025-02-21 VITALS
HEIGHT: 62 IN | BODY MASS INDEX: 29.26 KG/M2 | RESPIRATION RATE: 14 BRPM | DIASTOLIC BLOOD PRESSURE: 72 MMHG | SYSTOLIC BLOOD PRESSURE: 112 MMHG | WEIGHT: 159 LBS | OXYGEN SATURATION: 97 % | HEART RATE: 115 BPM

## 2025-02-21 DIAGNOSIS — S30.1XXA CONTUSION OF ABDOMINAL WALL, INITIAL ENCOUNTER: ICD-10-CM

## 2025-02-21 DIAGNOSIS — J45.909 UNSPECIFIED ASTHMA, UNCOMPLICATED: ICD-10-CM

## 2025-02-21 DIAGNOSIS — J44.9 CHRONIC OBSTRUCTIVE PULMONARY DISEASE, UNSPECIFIED: ICD-10-CM

## 2025-02-21 PROCEDURE — 99213 OFFICE O/P EST LOW 20 MIN: CPT

## 2025-02-21 PROCEDURE — G2211 COMPLEX E/M VISIT ADD ON: CPT

## 2025-02-21 NOTE — CDI QUERY NOTE - NSCDIOTHERTXTBX_GEN_ALL_CORE_HH
Clinical documentation and/or evidence of the patient’s presentation, evaluation, and medical management, as evidenced below, may support a diagnosis that is not documented in the medical record.  In order to ensure accurate coding and accuracy of the clinical record, the documentation in this patient’s medical record requires additional clarification.      If you think the supporting documentation and/or clinical evidence supports a more specific diagnosis, please include more specific documentation of a diagnosis associated with these findings in your progress note and/or discharge summary.    Please clarify if the patient was found to have one of the following:    • Fever is	suspected to be multifactorial associated with rheumatoid arthritis and lupus and could not be ruled out at the time of discharge  • Fever is suspected to be associated with rheumatoid arthritis could not be ruled out at the time of discharge	  • Fever is suspected to be associated with lupus	could not be ruled out at the time of discharge  • Other (specify)    Supporting documentation and/or clinical evidence:    2/11 Consult Note Adult-Infectious Disease Attending: ... ID consulted for fever .. Previous hospitalizations 5/2024, 6/2024 for Recurrent Fever + Pericarditis/tamponade & Pleural effusions. It was unclear if Coxsackie B related, titers positive, possible crossreactivity of serotypes-Patient had repeat titers that DECREASED. Seroconversion or an increase in titers between acute and convalescent sera of at least fourfold is considered strong evidence of current or recent infection ... Fevers at home. Afebrile ... RA, SLE/triple (+) APLS on cellcept  ... > 1 mo of fever more concerning for Rheumatologic etiology than infectious. Hx extensive infectious workup- Monitor off antimicrobials as no clear source of infection, would not want to mask anything    2/13 Discharge Note Provider: ... chief complaint of persistent fever, fatigue, and SOB for the past one month. In ED T 98.5 after having taken tylenol before presenting. She was started on cef and vanc empirically and admitted. ID was consulted, recommending monitoring off abx. Abx were discontinued and the patient was monitored had no fevers while inpatient. She is being discharged with close follow up with rheum and pulm outpatient ... fevers ongoing x1 month- completed courses of doxycycline + levaquin, was also on Prednisone for most of the last month ... RA, SLE/triple (+)APLS on Lovenox ... Immunocompromised state - d/t autoimmune disease, steroid use ... Rheum consult follows with Dr. Rivera who is on vacation - has follow up susi for next week ... Chronic pericarditis secondary to most likely SLE- c/w colchicine 0.6mg PO BID ... PRINCIPAL DISCHARGE DIAGNOSIS: Fever        Thank you,  Maine José RN [facility]S  [phone]

## 2025-02-24 ENCOUNTER — APPOINTMENT (OUTPATIENT)
Dept: OTOLARYNGOLOGY | Facility: CLINIC | Age: 55
End: 2025-02-24

## 2025-02-27 DIAGNOSIS — Z99.81 DEPENDENCE ON SUPPLEMENTAL OXYGEN: ICD-10-CM

## 2025-02-27 DIAGNOSIS — M32.9 SYSTEMIC LUPUS ERYTHEMATOSUS, UNSPECIFIED: ICD-10-CM

## 2025-02-27 DIAGNOSIS — Z79.52 LONG TERM (CURRENT) USE OF SYSTEMIC STEROIDS: ICD-10-CM

## 2025-02-27 DIAGNOSIS — Z95.0 PRESENCE OF CARDIAC PACEMAKER: ICD-10-CM

## 2025-02-27 DIAGNOSIS — D84.9 IMMUNODEFICIENCY, UNSPECIFIED: ICD-10-CM

## 2025-02-27 DIAGNOSIS — J44.9 CHRONIC OBSTRUCTIVE PULMONARY DISEASE, UNSPECIFIED: ICD-10-CM

## 2025-02-27 DIAGNOSIS — G89.29 OTHER CHRONIC PAIN: ICD-10-CM

## 2025-02-27 DIAGNOSIS — Z85.41 PERSONAL HISTORY OF MALIGNANT NEOPLASM OF CERVIX UTERI: ICD-10-CM

## 2025-02-27 DIAGNOSIS — M54.9 DORSALGIA, UNSPECIFIED: ICD-10-CM

## 2025-02-27 DIAGNOSIS — Z88.8 ALLERGY STATUS TO OTHER DRUGS, MEDICAMENTS AND BIOLOGICAL SUBSTANCES: ICD-10-CM

## 2025-02-27 DIAGNOSIS — I25.2 OLD MYOCARDIAL INFARCTION: ICD-10-CM

## 2025-02-27 DIAGNOSIS — I31.9 DISEASE OF PERICARDIUM, UNSPECIFIED: ICD-10-CM

## 2025-02-27 DIAGNOSIS — Z85.21 PERSONAL HISTORY OF MALIGNANT NEOPLASM OF LARYNX: ICD-10-CM

## 2025-02-27 DIAGNOSIS — Z79.02 LONG TERM (CURRENT) USE OF ANTITHROMBOTICS/ANTIPLATELETS: ICD-10-CM

## 2025-02-27 DIAGNOSIS — Z91.048 OTHER NONMEDICINAL SUBSTANCE ALLERGY STATUS: ICD-10-CM

## 2025-02-27 DIAGNOSIS — R50.9 FEVER, UNSPECIFIED: ICD-10-CM

## 2025-02-27 DIAGNOSIS — D68.61 ANTIPHOSPHOLIPID SYNDROME: ICD-10-CM

## 2025-02-27 DIAGNOSIS — I50.32 CHRONIC DIASTOLIC (CONGESTIVE) HEART FAILURE: ICD-10-CM

## 2025-02-27 DIAGNOSIS — K21.9 GASTRO-ESOPHAGEAL REFLUX DISEASE WITHOUT ESOPHAGITIS: ICD-10-CM

## 2025-02-27 DIAGNOSIS — Z86.711 PERSONAL HISTORY OF PULMONARY EMBOLISM: ICD-10-CM

## 2025-02-27 DIAGNOSIS — I11.0 HYPERTENSIVE HEART DISEASE WITH HEART FAILURE: ICD-10-CM

## 2025-03-06 ENCOUNTER — APPOINTMENT (OUTPATIENT)
Dept: RHEUMATOLOGY | Facility: CLINIC | Age: 55
End: 2025-03-06
Payer: MEDICARE

## 2025-03-06 VITALS
HEIGHT: 62 IN | BODY MASS INDEX: 29.08 KG/M2 | WEIGHT: 158 LBS | HEART RATE: 85 BPM | DIASTOLIC BLOOD PRESSURE: 83 MMHG | TEMPERATURE: 97.7 F | SYSTOLIC BLOOD PRESSURE: 143 MMHG

## 2025-03-06 DIAGNOSIS — J90 PLEURAL EFFUSION, NOT ELSEWHERE CLASSIFIED: ICD-10-CM

## 2025-03-06 PROCEDURE — 99215 OFFICE O/P EST HI 40 MIN: CPT

## 2025-03-06 RX ORDER — MYCOPHENOLATE MOFETIL 500 MG/1
500 TABLET ORAL TWICE DAILY
Qty: 540 | Refills: 1 | Status: ACTIVE | COMMUNITY
Start: 2025-03-06 | End: 1900-01-01

## 2025-04-10 NOTE — PATIENT PROFILE ADULT - FUNCTIONAL ASSESSMENT - DAILY ACTIVITY 5.
Sent in by outside pulmonary doctor for concern that abscess was not resolving   Patient has Remodulin pump in the area of the abscess   Started two weeks ago and was started on doxycycline   Denies fevers, chills, vomiting, myalgias during this time period   Had chronic diarrhea and occasional vomiting as a known side effects of some of her medications   Patient is allergic to vancomycin   - CT abdomen pelvis Peripherally enhancing lesion in the right mid abdominal wall subjacent to diffuse skin thickening measuring 5.0 x 2.1 x 4.0 cm consistent with phlegmon/abscess  Superficial skin thickening in the left abdominal wall at the level of the umbilicus. Underlying small abscess measures 8 mm.  MRSA on wound culture     PLAN  - patient now endorsing new pain overlying the insertion site of the pump, will monitor for now pending new workup   - Linezolid 600 Q12 PO hours end of date 4/16/25  - can continue linezolid 600 q12 oral for total 10 to 14 days - PA approved, will have to resent once she is closer to dc  - If patient begins to decompensate, can switch to Daptomycin 6mg/kg  - Blood cultures x2 - NGTD  - s/p I&D - ID following 4 = No assist / stand by assistance Sent in by outside pulmonary doctor for concern that abscess was not resolving   Patient has Remodulin pump in the area of the abscess   Started two weeks ago and was started on doxycycline   Denies fevers, chills, vomiting, myalgias during this time period   Had chronic diarrhea and occasional vomiting as a known side effects of some of her medications   Patient is allergic to vancomycin   - CT abdomen pelvis Peripherally enhancing lesion in the right mid abdominal wall subjacent to diffuse skin thickening measuring 5.0 x 2.1 x 4.0 cm consistent with phlegmon/abscess  Superficial skin thickening in the left abdominal wall at the level of the umbilicus. Underlying small abscess measures 8 mm.  MRSA on wound culture     PLAN  - patient now endorsing new pain overlying the insertion site of the pump- pending  GS consult for drainage   - Linezolid 600 Q12 PO hours end of date 4/16/25  - can continue linezolid 600 q12 oral for total 10 to 14 days - PA approved, will have to resent once she is closer to dc  - If patient begins to decompensate, can switch to Daptomycin 6mg/kg  - Blood cultures x2 - NGTD  - s/p I&D - ID following

## 2025-05-07 NOTE — PROGRESS NOTE ADULT - ASSESSMENT
52 y/o F w/ PMHx SLE, lupus anticoagulant, antiphospholipid syndrome (follows Dr. 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 Admitted for heparin coumadin bridging and management and work up of nodular abscesses.    # b/l LE and UE hematoma  # h/o SLE, APLA syndrome,  VTE and PE  recent hospitalization at [place] for upper extremity swelling and was diagnosed with subclavian vein in stent stenosis  lovenox d/c'ed- continue anticoagulation- with heparin IV- for bridging to coumadin with higher INR target 3-4  monitor PTT, PT; PTT- at goal; continue heparin at 6U/hr; INR- 2.14; give 7.5 mg coumading tonight  monitor hg    # nausea and vomiting episode- improving;   - possibly related to gastritis from medicaitons  zofran IV PRN  -start on protonix     #  cellulitis  continue doxycycline- changed back to IV    #h/o long-QT syndrome and VT s/p AICD  #HFpEF    # cysitis   UCx- <49K E colil; Sn- bactrim  patient c/o  dysuria  started on nitrofurantoin on 10/21- patient unable to tolerate- changed to bactrim on 10/22    #Hypomagnesemia- replace 2 g daily for 2 days; monitor    #COPD  - inhalers and nebs for COPD  - CXR no acute cardiopulmonary pathologies    #Chronic pain syndrome  - MS contin 60 q6hrs   - start PRN dilaudid for break thorugh pain    #Anxiety  - Xanax 2mg TID PRN      Pending: attaining coumadin therapeutic level  Dispo: home   52 y/o F w/ PMHx SLE, lupus anticoagulant, antiphospholipid syndrome (follows Dr. Sultana), rheumatoid arthritis, cervical cancer s/p hysterectomy, laryngeal cancer s/p multiple surgical scrapings of the vocal cords and lymphadenectomies and radiotherapy, h/o of Torsades de clint s/p COMA (17 days) w/ CHF s/p AICD+PPM in 2003, h/o DVT/PE, NSTEMI in 2015, subclavian stenosis s/p stent placement, HTN, HLD and recent admission to [facility] from 09/22-10/09 for proximal occlusion of left subclavian vein s/p in-stent restenosis where she was switched from coumadin to lovenox now presented to ED for diffuse ecchymosis and multiple nodular masses in all 4 extremities. Admitted for heparin coumadin bridging and management and work up of nodular abscesses.    # b/l LE and UE hematoma  # h/o SLE, APLA syndrome,  VTE and PE  recent hospitalization at [place] for upper extremity swelling and was diagnosed with subclavian vein in stent stenosis  lovenox d/c'ed- continue anticoagulation- with heparin IV- for bridging to coumadin with higher INR target 3-4  monitor PTT, PT; PTT- at goal; continue heparin at 6U/hr; INR- 2.14; give 7.5 mg coumading tonight  monitor hgb    # nausea and vomiting episode- improving;   - possibly related to gastritis from medicaitons  zofran IV PRN  -start on protonix     #  cellulitis  continue doxycycline- changed back to IV    #h/o long-QT syndrome and VT s/p AICD  #HFpEF    # cysitis   UCx- <49K E colil; Sn- bactrim  patient c/o  dysuria  started on nitrofurantoin on 10/21- patient unable to tolerate- changed to bactrim on 10/22    #Hypomagnesemia- replace 2 g daily for 2 days; monitor    #COPD  - inhalers and nebs for COPD  - CXR no acute cardiopulmonary pathologies    #Chronic pain syndrome  - MS contin 60 q6hrs   - start PRN dilaudid for break thorugh pain    #Anxiety  - Xanax 2mg TID PRN      Pending: attaining coumadin therapeutic level  Dispo: home   52 y/o F w/ PMHx SLE, lupus anticoagulant, antiphospholipid syndrome (follows Dr. Sultana), rheumatoid arthritis, cervical cancer s/p hysterectomy, laryngeal cancer s/p multiple surgical scrapings of the vocal cords and lymphadenectomies and radiotherapy, h/o of Torsades de clint s/p COMA (17 days) w/ CHF s/p AICD+PPM in 2003, h/o DVT/PE, NSTEMI in 2015, subclavian stenosis s/p stent placement, HTN, HLD and recent admission to [facility] from 09/22-10/09 for proximal occlusion of left subclavian vein s/p in-stent restenosis where she was switched from coumadin to lovenox now presented to ED for diffuse ecchymosis and multiple nodular masses in all 4 extremities. Admitted for heparin coumadin bridging and management and work up of nodular abscesses.    # b/l LE and UE hematoma  # h/o SLE, APLA syndrome,  VTE and PE  recent hospitalization at [place] for upper extremity swelling and was diagnosed with subclavian vein in stent stenosis  lovenox d/c'ed- continue anticoagulation- with heparin IV- for bridging to coumadin with higher INR target 3-4  monitor PTT, PT; PTT- at goal; continue heparin at 6U/hr; INR- 2.14; give 7.5 mg coumading tonight  monitor hgb    # nausea and vomiting episode- improving;   - possibly related to gastritis from medicaitons  zofran IV PRN  -start on protonix     #  cellulitis  continue doxycycline- changed back to IV    #h/o long-QT syndrome and VT s/p AICD  #HFpEF    # cysitis   UCx- <49K E colil; Sn- bactrim  patient c/o  dysuria  started on nitrofurantoin on 10/21- patient unable to tolerate- changed to bactrim on 10/22    #Hypomagnesemia- replace 2 g daily for 2 days; monitor    #COPD  - inhalers and nebs for COPD  - CXR no acute cardiopulmonary pathologies    #Chronic pain syndrome  - MS contin 60 q6hrs   - start PRN dilaudid for break thorugh pain    #Anxiety  - Xanax 2mg TID PRN    Appointment at coumadin clinic for Monday Oct 31st at 10:30 AM    Pending: attaining coumadin therapeutic level  Dispo: home   no

## 2025-07-23 NOTE — DISCHARGE NOTE PROVIDER - NSDCQMSTROKE_NEU_ALL_CORE
I recommend a left breast MRI guided biopsy. A breast radiology physician will perform the procedure. Possible diagnoses include benign, atypia or cancer. Bruising and mild discomfort after the biopsy is normal and will improve. I typically have results in 3-5 business days. I will call you with the results, please have your phone handy to take my call. If you receive medical information from [facility] Personal Health Record, it is possible to view or see results of your biopsy or procedure before I contact you directly. I will provide recommendations for future follow up based on your biopsy results.     You can see your health information, review clinical summaries from office visits & test results online when you follow your health with MY  Chart, a personal health record. To sign up go to [email or web address] [phone]. Thank you for choosing [facility] and trusting me as your healthcare provider. I am honored to be a provider on your health care team and I remain dedicated to helping you achieve your health goals.   No 410